# Patient Record
Sex: FEMALE | Race: WHITE | NOT HISPANIC OR LATINO | Employment: OTHER | URBAN - METROPOLITAN AREA
[De-identification: names, ages, dates, MRNs, and addresses within clinical notes are randomized per-mention and may not be internally consistent; named-entity substitution may affect disease eponyms.]

---

## 2017-01-04 ENCOUNTER — OFFICE VISIT (OUTPATIENT)
Dept: INTERNAL MEDICINE | Facility: CLINIC | Age: 77
End: 2017-01-04

## 2017-01-04 ENCOUNTER — TELEPHONE (OUTPATIENT)
Dept: INTERNAL MEDICINE | Facility: CLINIC | Age: 77
End: 2017-01-04

## 2017-01-04 ENCOUNTER — HOSPITAL ENCOUNTER (OUTPATIENT)
Dept: ULTRASOUND IMAGING | Facility: HOSPITAL | Age: 77
Discharge: HOME | End: 2017-01-04
Attending: INTERNAL MEDICINE | Admitting: INTERNAL MEDICINE

## 2017-01-04 VITALS
DIASTOLIC BLOOD PRESSURE: 78 MMHG | SYSTOLIC BLOOD PRESSURE: 124 MMHG | HEIGHT: 62 IN | OXYGEN SATURATION: 94 % | HEART RATE: 69 BPM
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
## 2017-01-04 DIAGNOSIS — E10.9 BRITTLE DIABETES MELLITUS (HCC): ICD-10-CM
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
## 2017-01-04 DIAGNOSIS — Z79.01 WARFARIN ANTICOAGULATION: ICD-10-CM

## 2017-01-04 DIAGNOSIS — M79.89 SWELLING OF LOWER EXTREMITY: ICD-10-CM

## 2017-01-04 DIAGNOSIS — M79.89 SWELLING OF LOWER EXTREMITY: Primary | ICD-10-CM

## 2017-01-04 LAB — INR PPP: 1.2 (ref 2–3)

## 2017-01-04 PROCEDURE — 85610 PROTHROMBIN TIME: CPT | Performed by: INTERNAL MEDICINE

## 2017-01-04 PROCEDURE — 36416 COLLJ CAPILLARY BLOOD SPEC: CPT | Performed by: INTERNAL MEDICINE

## 2017-01-04 PROCEDURE — 76881 US COMPL JOINT R-T W/IMG: CPT

## 2017-01-04 PROCEDURE — 99214 OFFICE O/P EST MOD 30 MIN: CPT | Performed by: INTERNAL MEDICINE

## 2017-01-04 RX ORDER — INSULIN DETEMIR 100 [IU]/ML
INJECTION, SOLUTION SUBCUTANEOUS
COMMUNITY
Start: 2016-12-22 | End: 2017-04-26

## 2017-01-04 NOTE — TELEPHONE ENCOUNTER
----- Message from Johnna John MA sent at 1/3/2017  1:20 PM EST -----  Contact: [phone]  Per Dr. Mercado, put in at [phone].  ----- Message -----     From: Johnna John MA     Sent: 1/3/2017   9:37 AM       To: Johnna John MA        ----- Message -----     From: Beth Richardson     Sent: 1/3/2017   9:32 AM       To: Izabella Mercado Clinical [place]    Pt. Requesting an appointment today around 4 pm. Her left leg isn't any better at all and she has a ride to come in today at 4pm.  I told her that we can check with Dr. Mercado to see where we can fit her in.

## 2017-01-04 NOTE — MR AVS SNAPSHOT
Joya Singh   1/4/2017 12:20 PM   Office Visit    Dept Phone:  [phone]   Encounter #:  [id number]    Provider:  Chari Mercado MD   Department:  [facility] INTERNAL MED AND PEDS                Your Full Care Plan              Your Updated Medication List          This list is accurate as of: 1/4/17  1:46 PM.  Always use your most recent med list.                ACCU-CHEK FASTCLIX LANCETS misc   1 each 3 (three) times a day. TEST THREE TIMES DAILY TO FOUR TIMES DAILY AS DIRECTED       ACCU-CHEK KENNETH SMARTVIEW W/DEVICE kit       acetaminophen 500 MG tablet   Commonly known as:  TYLENOL       aspirin 81 MG tablet       atorvastatin 10 MG tablet   Commonly known as:  LIPITOR   Take one (1) tablet orally (by mouth) once daily       B-D SINGLE USE SWABS REGULAR pads       buPROPion  MG 12 hr tablet   Commonly known as:  WELLBUTRIN SR       CALCIUM PLUS VITAMIN D3 PO       cephalexin 500 MG capsule   Commonly known as:  KEFLEX   Take 1 capsule by mouth 2 (Two) Times a Day.       cetirizine 10 MG tablet   Commonly known as:  zyrTEC       cyclobenzaprine 10 MG tablet   Commonly known as:  FLEXERIL   Take 1 tablet by mouth 3 (three) times a day as needed for muscle spasms.       diphenhydrAMINE 25 mg capsule   Commonly known as:  BENADRYL       DULoxetine 60 MG capsule   Commonly known as:  CYMBALTA   Take 1 capsule by mouth Daily.       furosemide 20 MG tablet   Commonly known as:  LASIX   Take one (1) tablet orally (by mouth) daily as directed       gabapentin 400 MG capsule   Commonly known as:  NEURONTIN   Take 1 in am 1 afternoon  and 2 at bedtime       glucose blood test strip   Commonly known as:  ACCU-CHEK SMARTVIEW   Accu-Chek SmartView In Vitro Strip TEST THREE TIMES DAILY  TO FOUR TIMES DAILY AS DIRECTED       insulin aspart 100 UNIT/ML solution pen-injector sc pen   Commonly known as:  NOVOLOG FLEXPEN   Inject 20 Units under the skin 3 (Three) Times a Day  "With Meals.       Insulin Degludec 200 UNIT/ML solution pen-injector   Commonly known as:  TRESIBA FLEXTOUCH   Inject 70 Units under the skin Every Night.       LEVEMIR FLEXTOUCH 100 UNIT/ML injection   Generic drug:  insulin detemir       levothyroxine 50 MCG tablet   Commonly known as:  SYNTHROID, LEVOTHROID   Take 1 tablet by mouth Daily.       lisinopril 5 MG tablet   Commonly known as:  PRINIVIL,ZESTRIL   Take one (1) tablet orally (by mouth) daily       metoprolol tartrate 25 MG tablet   Commonly known as:  LOPRESSOR   Take 1/2 tablet orally (by mouth) twice daily       Needle (Disp) 30G X 1/2\" misc   Commonly known as:  BD DISP NEEDLES   To be used 3 times daily with Novolog Flexpen.       omeprazole 40 MG capsule   Commonly known as:  priLOSEC   Take 1 capsule by mouth Daily.       potassium chloride 10 MEQ CR tablet   Commonly known as:  K-DUR   Take one (1) tablet orally (by mouth) daily       QUEtiapine 50 MG tablet   Commonly known as:  SEROquel   Take one (1) tablet orally (by mouth) each night at bedtime       traMADol 50 MG tablet   Commonly known as:  ULTRAM   Take one (1) tablet orally (by mouth) every 8 hours as needed FOR PAIN       traZODone 100 MG tablet   Commonly known as:  DESYREL       ULTICARE MICRO PEN NEEDLES 32G X 4 MM misc   Generic drug:  Insulin Pen Needle       warfarin 5 MG tablet   Commonly known as:  COUMADIN   Alternate 1 tablet by mouth one evening and then 1 1/2 tab by mouth the next evening               We Performed the Following     Ambulatory Referral to Home Health     POC INR       You Were Diagnosed With        Codes Comments    Swelling of lower extremity    -  Primary ICD-10-CM: M79.89  ICD-9-CM: 729.81     Fall in home, sequela     ICD-10-CM: W19.XXXS  ICD-9-CM: 909.4, E929.3     Brittle diabetes mellitus     ICD-10-CM: E10.9  ICD-9-CM: 250.00     Warfarin anticoagulation     ICD-10-CM: Z79.01  ICD-9-CM: V58.61       Instructions     None    Patient Instructions " History      Upcoming Appointments     Visit Type Date Time Department    ACUTE           2017 12:20 PM [facility]    US LAG NONVASC EXT COMP 2017  4:00 PM [facility] ULTRASOUND    FOLLOW UP 2017  8:20 AM [facility]    FOLLOW UP 2017  8:45 AM [facility]TH[facility]ED [facility]    FOLLOW UP SLEEP CLINIC 3/28/2017 10:45 AM [facility] SLEEP LAB      MyChart Signup     [facility] [employer] allows you to send messages to your doctor, view your test results, renew your prescriptions, schedule appointments, and more. To sign up, go to Hash[employer] and click on the Sign Up Now link in the New User? box. Enter your MarketV[employer] Activation Code exactly as it appears below along with the last four digits of your Social Security Number and your Date of Birth () to complete the sign-up process. If you do not sign up before the expiration date, you must request a new code.    MarketV[employer] Activation Code: [email or web address]  Expires: 2017  5:35 AM    If you have questions, you can email [email or web address] or call [phone] to talk to our MarketV[employer] staff. Remember, [employer] is NOT to be used for urgent needs. For medical emergencies, dial 911.               Other Info from Your Visit           Your Appointments     2017  4:00 PM EST   US lag nonvasc ext comp with LAG US 1   [facility] [place] [facility] ([place])    [address]  [place] [address] [phone]           No prep. Arrive 15 minutes before your exam. Bring a current list of medications.            2017  8:20 AM EST   Follow Up with Chari Mercado MD   [facility] INTERNAL MED AND PEDS (--)    [address]  [place] [address] [phone]           Arrive 15 minutes prior to appointment.            2017  8:45 AM EST   Follow Up with German Wylie MD   [facility] ORTHOPEDICS (--)    [address]  [place]  "[place] [address] [phone]           Arrive 15 minutes prior to appointment.            Mar 28, 2017 10:45 AM EDT   Follow Up Sleep Clinic with [facility] SLEEP LAB PROVIDER SCHEDULE   [facility] SLEEP CENTER ([place])    [address] [place] [address]  [place] [address] [phone]            1.  If you are currently on a CPAP or BiPAP please bring in your SD card or memory card.  2.  If you are experiencing problems with your current mask, please bring your mask with you to your appointment.                Allergies     Codeine      Morphine And Related      Penicillins        Reason for Visit     Leg Pain Pts son says she has not taken her meds since Sunday.      Vital Signs     Blood Pressure Pulse Height Oxygen Saturation Smoking Status       124/78 (BP Location: Left arm, Patient Position: Sitting, Cuff Size: Adult) 69 62\" (157.5 cm) 94% Never Smoker       Problems and Diagnoses Noted     Brittle diabetes mellitus    Fall in home    Swelling of the ankle, feet, or leg    Taking blood thinners        "

## 2017-01-04 NOTE — PROGRESS NOTES
Subjective     Joya Singh is a 76 y.o. female, who presents with a chief complaint of   Chief Complaint   Patient presents with   • Leg Pain     Pts son says she has not taken her meds since Sunday.       HPI   The pt is here for follow up.  Her left leg is still hurting her.  The pain is in the medial part of there left lower leg.  She has had a negative u/s for clots.  She was tried on empiric antibiotics with no change.  Heat helps a little.  She is willing to try some physical therapy.  She has some left leg swelling.  She has fallen at home.  She doesn't want to eat and quit taking her meds for a few days.  She is a brittle diabetic with lots of fluctuation in her blood sugars.  She is also on warfarin which makes her high risk with all of her recent falls.  She hasnt taken her coumadin in a few days.        The following portions of the patient's history were reviewed and updated as appropriate: allergies, current medications, past family history, past medical history, past social history, past surgical history and problem list.    Allergies: Codeine; Morphine and related; and Penicillins    Review of Systems   Constitutional: Negative.    HENT: Negative.    Eyes: Negative.    Respiratory: Negative.    Cardiovascular: Negative.    Gastrointestinal: Negative.    Endocrine: Negative.    Genitourinary: Negative.    Musculoskeletal:        Leg pain     Skin: Negative.    Allergic/Immunologic: Negative.    Neurological: Negative.    Hematological: Negative.    Psychiatric/Behavioral: Negative.    All other systems reviewed and are negative.      Objective     Wt Readings from Last 3 Encounters:   12/21/16 209 lb (94.8 kg)   11/23/16 209 lb 6.4 oz (95 kg)   11/18/16 214 lb (97.1 kg)     Temp Readings from Last 3 Encounters:   11/08/16 97.8 °F (36.6 °C)   07/19/16 98.6 °F (37 °C)   06/15/16 97.9 °F (36.6 °C)     BP Readings from Last 3 Encounters:   01/04/17 124/78   12/21/16 114/80   12/14/16 124/70     Pulse  Readings from Last 3 Encounters:   01/04/17 69   12/21/16 72   12/14/16 68     There is no height or weight on file to calculate BMI.    Physical Exam   Constitutional: She is oriented to person, place, and time. She appears well-developed and well-nourished. No distress.   HENT:   Head: Normocephalic and atraumatic.   Right Ear: External ear normal.   Left Ear: External ear normal.   Nose: Nose normal.   Mouth/Throat: Oropharynx is clear and moist.   Eyes: Conjunctivae and EOM are normal. Pupils are equal, round, and reactive to light.   Neck: Normal range of motion. Neck supple.   Cardiovascular: Normal rate, regular rhythm, normal heart sounds and intact distal pulses.    Pulmonary/Chest: Effort normal and breath sounds normal. No respiratory distress. She has no wheezes.   Musculoskeletal:   Pt in wheelchair  Left lower leg with fluid like collection in the medial area below the left knee.  No erythema or increased warmth.   Neurological: She is alert and oriented to person, place, and time.   Skin: Skin is warm and dry.   Psychiatric: She has a normal mood and affect. Her behavior is normal. Judgment and thought content normal.   Nursing note and vitals reviewed.          Results for orders placed or performed in visit on 12/21/16   POC INR   Result Value Ref Range    INR 2.20 2 - 3       Assessment/Plan   Joya was seen today for leg pain.    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  -     US nonvascular extremity complete; Future  -     Ambulatory Referral to Home Health  -     POC INR          Current  Outpatient Prescriptions:   •  ACCU-CHEK FASTCLIX LANCETS misc, 1 each 3 (three) times a day. TEST THREE TIMES DAILY TO FOUR TIMES DAILY AS DIRECTED, Disp: 102 each, Rfl: 11  •  acetaminophen (TYLENOL) 500 MG tablet, Take by mouth., Disp: , Rfl:   •  Alcohol Swabs (B-D SINGLE USE SWABS REGULAR) pads, , Disp: , Rfl:   •  aspirin 81 MG tablet, Take 1 tablet by mouth daily., Disp: , Rfl:   •  atorvastatin (LIPITOR) 10 MG tablet, Take one (1) tablet orally (by mouth) once daily, Disp: 90 tablet, Rfl: 1  •  Blood Glucose Monitoring Suppl (ACCU-CHEK KENNETH SMARTVIEW) W/DEVICE kit, USE AS DIRECTED, Disp: , Rfl:   •  buPROPion SR (WELLBUTRIN SR) 150 MG 12 hr tablet, Take 1 tablet by mouth Every Night., Disp: , Rfl:   •  Calcium Carb-Cholecalciferol (CALCIUM PLUS VITAMIN D3 PO), Take by mouth., Disp: , Rfl:   •  cephalexin (KEFLEX) 500 MG capsule, Take 1 capsule by mouth 2 (Two) Times a Day., Disp: 20 capsule, Rfl: 0  •  cetirizine (ZyrTEC) 10 MG tablet, Take by mouth., Disp: , Rfl:   •  cyclobenzaprine (FLEXERIL) 10 MG tablet, Take 1 tablet by mouth 3 (three) times a day as needed for muscle spasms., Disp: 90 tablet, Rfl: 1  •  diphenhydrAMINE (BENADRYL) 25 mg capsule, Take 25 mg by mouth Every 6 (Six) Hours As Needed for itching (take with norco)., Disp: , Rfl:   •  DULoxetine (CYMBALTA) 60 MG capsule, Take 1 capsule by mouth Daily., Disp: 90 capsule, Rfl: 1  •  furosemide (LASIX) 20 MG tablet, Take one (1) tablet orally (by mouth) daily as directed, Disp: 90 tablet, Rfl: 1  •  gabapentin (NEURONTIN) 400 MG capsule, Take 1 in am 1 afternoon  and 2 at bedtime, Disp: 120 capsule, Rfl: 5  •  glucose blood (ACCU-CHEK SMARTVIEW) test strip, Accu-Chek SmartView In Vitro Strip TEST THREE TIMES DAILY  TO FOUR TIMES DAILY AS DIRECTED, Disp: 300 each, Rfl: 3  •  insulin aspart (NOVOLOG FLEXPEN) 100 UNIT/ML solution pen-injector sc pen, Inject 20 Units under the skin 3 (Three) Times a Day With Meals., Disp: 10 pen, Rfl: 1  •  Insulin  "Degludec (TRESIBA FLEXTOUCH) 200 UNIT/ML solution pen-injector, Inject 70 Units under the skin Every Night., Disp: 9 mL, Rfl: 6  •  LEVEMIR FLEXTOUCH 100 UNIT/ML injection, , Disp: , Rfl:   •  levothyroxine (SYNTHROID, LEVOTHROID) 50 MCG tablet, Take 1 tablet by mouth Daily., Disp: 90 tablet, Rfl: 1  •  lisinopril (PRINIVIL,ZESTRIL) 5 MG tablet, Take one (1) tablet orally (by mouth) daily, Disp: 30 tablet, Rfl: 6  •  metoprolol tartrate (LOPRESSOR) 25 MG tablet, Take 1/2 tablet orally (by mouth) twice daily, Disp: 90 tablet, Rfl: 1  •  Needle, Disp, (BD DISP NEEDLES) 30G X 1/2\" misc, To be used 3 times daily with Novolog Flexpen., Disp: 100 each, Rfl: 5  •  omeprazole (priLOSEC) 40 MG capsule, Take 1 capsule by mouth Daily., Disp: 90 capsule, Rfl: 1  •  potassium chloride (K-DUR) 10 MEQ CR tablet, Take one (1) tablet orally (by mouth) daily, Disp: 90 tablet, Rfl: 2  •  QUEtiapine (SEROquel) 50 MG tablet, Take one (1) tablet orally (by mouth) each night at bedtime, Disp: 30 tablet, Rfl: 6  •  traMADol (ULTRAM) 50 MG tablet, Take one (1) tablet orally (by mouth) every 8 hours as needed FOR PAIN, Disp: 60 tablet, Rfl: 0  •  traZODone (DESYREL) 100 MG tablet, Take 2 tablets by mouth Every Night., Disp: , Rfl:   •  ULTICARE MICRO PEN NEEDLES 32G X 4 MM misc, , Disp: , Rfl:   •  warfarin (COUMADIN) 5 MG tablet, Alternate 1 tablet by mouth one evening and then 1 1/2 tab by mouth the next evening, Disp: 60 tablet, Rfl: 1  There are no discontinued medications.    Return in about 2 weeks (around 1/18/2017) for Recheck.  "

## 2017-01-09 ENCOUNTER — TELEPHONE (OUTPATIENT)
Dept: INTERNAL MEDICINE | Facility: CLINIC | Age: 77
End: 2017-01-09

## 2017-01-09 DIAGNOSIS — M54.9 BACK PAIN, CHRONIC: ICD-10-CM

## 2017-01-09 DIAGNOSIS — G89.29 BACK PAIN, CHRONIC: ICD-10-CM

## 2017-01-09 RX ORDER — CYCLOBENZAPRINE HCL 10 MG
TABLET ORAL
Qty: 90 TABLET | Refills: 0 | Status: SHIPPED | OUTPATIENT
Start: 2017-01-09 | End: 2017-03-13 | Stop reason: SDUPTHER

## 2017-01-09 NOTE — TELEPHONE ENCOUNTER
Patient notified.   ----- Message from Chari Mercado MD sent at 1/9/2017  7:52 AM EST -----  Call pt about u/s.  It is all normal

## 2017-01-11 ENCOUNTER — TELEPHONE (OUTPATIENT)
Dept: INTERNAL MEDICINE | Facility: CLINIC | Age: 77
End: 2017-01-11

## 2017-01-11 NOTE — TELEPHONE ENCOUNTER
"Hali given verbal order for \"theraputic\" ultrasound.    ----- Message from Beth Farley MA sent at 1/11/2017  1:35 PM EST -----  LM for HALI to call back with clarification/fax# for order.  ----- Message -----     From: Beth Farley MA     Sent: 1/10/2017   4:10 PM       To: Beth Farley MA        ----- Message -----     From: Keren Parmar     Sent: 1/10/2017   3:22 PM       To: Izabella Brantley [facility] PHYSICAL THERAPIST (HALI) IS CALLER. STATES SHE IS HAVING PAIN IN HER RIGHT LEG. WOULD LIKE TO DO ANOTHER ULTRA SOUND, NOT DIAGNOSTIC- JUST AN ULTRA SOUND FOR TREATMENT OF PAIN. CAN WE GET THE PT AN ORDER? CALL BACK FOR PHYSICAL THERAPIST [phone]. PT CALL BACK [phone].      "

## 2017-01-24 ENCOUNTER — OFFICE VISIT (OUTPATIENT)
Dept: INTERNAL MEDICINE | Facility: CLINIC | Age: 77
End: 2017-01-24

## 2017-01-24 VITALS
WEIGHT: 207.4 LBS | DIASTOLIC BLOOD PRESSURE: 84 MMHG | HEIGHT: 62 IN | OXYGEN SATURATION: 95 % | SYSTOLIC BLOOD PRESSURE: 130 MMHG | HEART RATE: 96 BPM | BODY MASS INDEX: 38.16 KG/M2

## 2017-01-24 DIAGNOSIS — E08.59 DIABETES MELLITUS DUE TO UNDERLYING CONDITION WITH OTHER CIRCULATORY COMPLICATIONS (HCC): ICD-10-CM

## 2017-01-24 DIAGNOSIS — Z79.01 WARFARIN ANTICOAGULATION: ICD-10-CM

## 2017-01-24 DIAGNOSIS — I82.409 ACUTE DEEP VEIN THROMBOSIS (DVT) OF OTHER VEIN OF LOWER EXTREMITY: Primary | ICD-10-CM

## 2017-01-24 DIAGNOSIS — M79.605 LEFT LEG PAIN: ICD-10-CM

## 2017-01-24 DIAGNOSIS — E78.2 MIXED HYPERLIPIDEMIA: ICD-10-CM

## 2017-01-24 DIAGNOSIS — I25.10 CHRONIC CORONARY ARTERY DISEASE: ICD-10-CM

## 2017-01-24 DIAGNOSIS — I10 ESSENTIAL HYPERTENSION: ICD-10-CM

## 2017-01-24 LAB
INR PPP: 1.57 (ref 0.9–1.1)
PROTHROMBIN TIME: 18.9 SECONDS (ref 12.1–15)

## 2017-01-24 PROCEDURE — 99214 OFFICE O/P EST MOD 30 MIN: CPT | Performed by: INTERNAL MEDICINE

## 2017-01-24 RX ORDER — TRAMADOL HYDROCHLORIDE 50 MG/1
50 TABLET ORAL EVERY 8 HOURS PRN
Qty: 60 TABLET | Refills: 0 | Status: SHIPPED | OUTPATIENT
Start: 2017-01-24 | End: 2017-03-03 | Stop reason: SDUPTHER

## 2017-01-24 RX ORDER — WARFARIN SODIUM 5 MG/1
TABLET ORAL
Qty: 60 TABLET | Refills: 1 | Status: SHIPPED | OUTPATIENT
Start: 2017-01-24 | End: 2017-08-18

## 2017-01-24 NOTE — MR AVS SNAPSHOT
Joya Singh   1/24/2017 8:20 AM   Office Visit    Dept Phone:  [phone]   Encounter #:  [id number]    Provider:  Chari Mercado MD   Department:  [facility] INTERNAL MED AND PEDS                Your Full Care Plan              Today's Medication Changes          These changes are accurate as of: 1/24/17  9:55 AM.  If you have any questions, ask your nurse or doctor.               Medication(s)that have changed:     traMADol 50 MG tablet   Commonly known as:  ULTRAM   Take 1 tablet by mouth Every 8 (Eight) Hours As Needed for moderate pain (4-6).   What changed:  See the new instructions.   Changed by:  Chari Mercado MD         Stop taking medication(s)listed here:     cephalexin 500 MG capsule   Commonly known as:  KEFLEX   Stopped by:  Chari Mercado MD                Where to Get Your Medications      These medications were sent to [facility] [phone]  - [phone] [address]     Phone:  [phone]     warfarin 5 MG tablet         You can get these medications from any pharmacy     Bring a paper prescription for each of these medications     traMADol 50 MG tablet                  Your Updated Medication List          This list is accurate as of: 1/24/17  9:55 AM.  Always use your most recent med list.                ACCU-CHEK FASTCLIX LANCETS misc   1 each 3 (three) times a day. TEST THREE TIMES DAILY TO FOUR TIMES DAILY AS DIRECTED       ACCU-CHEK KENNETH SMARTVIEW W/DEVICE kit       acetaminophen 500 MG tablet   Commonly known as:  TYLENOL       aspirin 81 MG tablet       atorvastatin 10 MG tablet   Commonly known as:  LIPITOR   Take one (1) tablet orally (by mouth) once daily       B-D SINGLE USE SWABS REGULAR pads       buPROPion  MG 12 hr tablet   Commonly known as:  WELLBUTRIN SR       CALCIUM PLUS VITAMIN D3 PO       cetirizine 10 MG  "tablet   Commonly known as:  zyrTEC       cyclobenzaprine 10 MG tablet   Commonly known as:  FLEXERIL   Take one (1) tablet orally (by mouth) three times daily as needed       diphenhydrAMINE 25 mg capsule   Commonly known as:  BENADRYL       DULoxetine 60 MG capsule   Commonly known as:  CYMBALTA   Take 1 capsule by mouth Daily.       furosemide 20 MG tablet   Commonly known as:  LASIX   Take one (1) tablet orally (by mouth) daily as directed       gabapentin 400 MG capsule   Commonly known as:  NEURONTIN   Take 1 in am 1 afternoon  and 2 at bedtime       glucose blood test strip   Commonly known as:  ACCU-CHEK SMARTVIEW   Accu-Chek SmartView In Vitro Strip TEST THREE TIMES DAILY  TO FOUR TIMES DAILY AS DIRECTED       insulin aspart 100 UNIT/ML solution pen-injector sc pen   Commonly known as:  NOVOLOG FLEXPEN   Inject 20 Units under the skin 3 (Three) Times a Day With Meals.       Insulin Degludec 200 UNIT/ML solution pen-injector   Commonly known as:  TRESIBA FLEXTOUCH   Inject 70 Units under the skin Every Night.       LEVEMIR FLEXTOUCH 100 UNIT/ML injection   Generic drug:  insulin detemir       levothyroxine 50 MCG tablet   Commonly known as:  SYNTHROID, LEVOTHROID   Take 1 tablet by mouth Daily.       lisinopril 5 MG tablet   Commonly known as:  PRINIVIL,ZESTRIL   Take one (1) tablet orally (by mouth) daily       metoprolol tartrate 25 MG tablet   Commonly known as:  LOPRESSOR   Take 1/2 tablet orally (by mouth) twice daily       Needle (Disp) 30G X 1/2\" misc   Commonly known as:  BD DISP NEEDLES   To be used 3 times daily with Novolog Flexpen.       omeprazole 40 MG capsule   Commonly known as:  priLOSEC   Take 1 capsule by mouth Daily.       potassium chloride 10 MEQ CR tablet   Commonly known as:  K-DUR   Take one (1) tablet orally (by mouth) daily       QUEtiapine 50 MG tablet   Commonly known as:  SEROquel   Take one (1) tablet orally (by mouth) each night at bedtime       traMADol 50 MG tablet "   Commonly known as:  ULTRAM   Take 1 tablet by mouth Every 8 (Eight) Hours As Needed for moderate pain (4-6).       traZODone 100 MG tablet   Commonly known as:  DESYREL       ULTICARE MICRO PEN NEEDLES 32G X 4 MM misc   Generic drug:  Insulin Pen Needle       warfarin 5 MG tablet   Commonly known as:  COUMADIN   Alternate 1 tablet by mouth one evening and then 1 1/2 tab by mouth the next evening               We Performed the Following     Protime-INR       You Were Diagnosed With        Codes Comments    Acute deep vein thrombosis (DVT) of other vein of lower extremity    -  Primary ICD-10-CM: I82.409     Warfarin anticoagulation     ICD-10-CM: Z79.01  ICD-9-CM: V58.61     Left leg pain     ICD-10-CM: M79.605  ICD-9-CM: 729.5     Chronic coronary artery disease     ICD-10-CM: I25.10  ICD-9-CM: 414.00     Mixed hyperlipidemia     ICD-10-CM: E78.2  ICD-9-CM: 272.2     Essential hypertension     ICD-10-CM: I10  ICD-9-CM: 401.9     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 To sign up, go to WebSafety and click on the Sign Up Now link in the New User? box. Enter your Pintics Activation Code exactly as it appears below along with the last four digits of your Social Security Number and your Date of Birth () to complete the sign-up process. If you do not sign up before the expiration date, you must request a new code.    Pintics Activation Code:  [email or web address]  Expires: 2/7/2017  9:55 AM    If you have questions, you can email [email or web address] or call [phone] to talk to our MyChart staff. Remember, Shortlist[employer] is NOT to be used for urgent needs. For medical emergencies, dial 911.               Other Info from Your Visit           Your Appointments     Feb 21, 2017  8:45 AM EST   Follow Up with German Wylie MD   [facility] MEDICAL [facility] ORTHOPEDICS (--)    [address]  [facility] [address] [phone]           Arrive 15 minutes prior to appointment.            Mar 28, 2017 10:45 AM EDT   Follow Up Sleep Clinic with  L[facility] SLEEP LAB PROVIDER SCHEDULE   [facility] SLEEP CENTER ([place])    [address]  [place] [address] [phone]            1.  If you are currently on a CPAP or BiPAP please bring in your SD card or memory card.  2.  If you are experiencing problems with your current mask, please bring your mask with you to your appointment.                Allergies     Codeine      Morphine And Related      Penicillins        Reason for Visit     Follow-up Patient says that OT told her yesterday that her O2 was low.     Cellulitis L leg, still having a lot of pain. 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

## 2017-01-24 NOTE — PROGRESS NOTES
"Subjective     Joya Singh is a 76 y.o. female, who presents with a chief complaint of   Chief Complaint   Patient presents with   • Follow-up     Patient says that OT told her yesterday that her O2 was low.    • Cellulitis     L leg, still having a lot of pain. Patient says pain is \"in her veins\"       HPI   The pt is here for follow up.  She still is having leg pain but says that it is getting better.  She feels like the pain is in her veins.  Her work up for clot was neg.  OT/PT is coming to  See her with home health.  No increased pain with activity.  The pain is still in the medial area of the leg below the knee.  No calf pain.  No pain in her toes.  No hx of peripheral artery disease.  She does have peripheral neuropathy. She has pain medication at home but needs a refill.      The pt says the therapist told her her oxygen levels were low yesterday.  She doesn't know how low the levels were.  She has cpap at home but she hasnt been using it lately.     Her glucoses have been better controlled per the pt.  135 and below.  She says she hasnt had anything over 200.  She has had lots of issues with uncontrolled diabetes with a1c's in the 9-11 range.  Her last a1c was much better at 6.9. No hypoglycemia.  + hx CAD.    The following portions of the patient's history were reviewed and updated as appropriate: allergies, current medications, past family history, past medical history, past social history, past surgical history and problem list.    Allergies: Codeine; Morphine and related; and Penicillins    Review of Systems   Constitutional: Negative.    HENT: Negative.    Eyes: Negative.    Respiratory: Negative.    Cardiovascular: Negative.    Gastrointestinal: Negative.    Endocrine: Negative.    Genitourinary: Negative.    Musculoskeletal:        Leg pain.   Skin: Negative.    Allergic/Immunologic: Negative.    Neurological: Negative.    Hematological: Negative.    Psychiatric/Behavioral: Negative.    All other " systems reviewed and are negative.      Objective     Wt Readings from Last 3 Encounters:   01/24/17 207 lb 6.4 oz (94.1 kg)   12/21/16 209 lb (94.8 kg)   11/23/16 209 lb 6.4 oz (95 kg)     Temp Readings from Last 3 Encounters:   11/08/16 97.8 °F (36.6 °C)   07/19/16 98.6 °F (37 °C)   06/15/16 97.9 °F (36.6 °C)     BP Readings from Last 3 Encounters:   01/24/17 130/84   01/04/17 124/78   12/21/16 114/80     Pulse Readings from Last 3 Encounters:   01/24/17 96   01/04/17 69   12/21/16 72     Body mass index is 37.93 kg/(m^2).  SpO2 Readings from Last 3 Encounters:   01/24/17 95%   01/04/17 94%   12/21/16 98%       Physical Exam   Constitutional: She is oriented to person, place, and time. She appears well-developed and well-nourished. No distress.   HENT:   Head: Normocephalic and atraumatic.   Right Ear: External ear normal.   Left Ear: External ear normal.   Nose: Nose normal.   Mouth/Throat: Oropharynx is clear and moist.   Eyes: Conjunctivae and EOM are normal. Pupils are equal, round, and reactive to light.   Neck: Normal range of motion. Neck supple.   Cardiovascular: Normal rate, regular rhythm, normal heart sounds and intact distal pulses.    Pulmonary/Chest: Effort normal and breath sounds normal. No respiratory distress. She has no wheezes.   Musculoskeletal: Normal range of motion.   Normal gait   Neurological: She is alert and oriented to person, place, and time.   Skin: Skin is warm and dry.   Psychiatric: She has a normal mood and affect. Her behavior is normal. Judgment and thought content normal.   Nursing note and vitals reviewed.      Results for orders placed or performed in visit on 01/04/17   POC INR   Result Value Ref Range    INR 1.2 (A) 2 - 3       Assessment/Plan   Joya was seen today for follow-up and cellulitis.    Diagnoses and all orders for this visit:    Acute deep vein thrombosis (DVT) of other vein of lower extremity  -     Protime-INR; Future  -     Protime-INR    Warfarin  anticoagulation - INR readings have been up and down for a long time.  Recheck INR today.   -     warfarin (COUMADIN) 5 MG tablet; Alternate 1 tablet by mouth one evening and then 1 1/2 tab by mouth the next evening    Left leg pain  -     US Ankle / Brachial Indices Extremity Complete; Future  -     traMADol (ULTRAM) 50 MG tablet; Take 1 tablet by mouth Every 8 (Eight) Hours As Needed for moderate pain (4-6).  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 TEST THREE TIMES DAILY TO FOUR TIMES DAILY AS DIRECTED 102 each 11   • acetaminophen (TYLENOL) 500 MG tablet Take by mouth.     • Alcohol Swabs (B-D SINGLE USE SWABS REGULAR) pads      • aspirin 81 MG tablet Take 1 tablet by mouth daily.     • atorvastatin (LIPITOR) 10 MG tablet Take one (1) tablet orally (by mouth) once daily 90 tablet 1   • Blood Glucose Monitoring Suppl (ACCU-CHEK KENNETH SMARTVIEW) W/DEVICE kit USE AS DIRECTED     • buPROPion SR (WELLBUTRIN SR) 150 MG 12 hr tablet Take 1 tablet by mouth Every Night.     • Calcium Carb-Cholecalciferol (CALCIUM PLUS VITAMIN D3 PO) Take by mouth.     • cetirizine (ZyrTEC) 10 MG tablet Take by mouth.     •  "cyclobenzaprine (FLEXERIL) 10 MG tablet Take one (1) tablet orally (by mouth) three times daily as needed 90 tablet 0   • diphenhydrAMINE (BENADRYL) 25 mg capsule Take 25 mg by mouth Every 6 (Six) Hours As Needed for itching (take with norco).     • DULoxetine (CYMBALTA) 60 MG capsule Take 1 capsule by mouth Daily. 90 capsule 1   • furosemide (LASIX) 20 MG tablet Take one (1) tablet orally (by mouth) daily as directed 90 tablet 1   • gabapentin (NEURONTIN) 400 MG capsule Take 1 in am 1 afternoon  and 2 at bedtime 120 capsule 5   • glucose blood (ACCU-CHEK SMARTVIEW) test strip Accu-Chek SmartView In Vitro Strip TEST THREE TIMES DAILY  TO FOUR TIMES DAILY AS DIRECTED 300 each 3   • insulin aspart (NOVOLOG FLEXPEN) 100 UNIT/ML solution pen-injector sc pen Inject 20 Units under the skin 3 (Three) Times a Day With Meals. 10 pen 1   • Insulin Degludec (TRESIBA FLEXTOUCH) 200 UNIT/ML solution pen-injector Inject 70 Units under the skin Every Night. 9 mL 6   • LEVEMIR FLEXTOUCH 100 UNIT/ML injection      • levothyroxine (SYNTHROID, LEVOTHROID) 50 MCG tablet Take 1 tablet by mouth Daily. 90 tablet 1   • lisinopril (PRINIVIL,ZESTRIL) 5 MG tablet Take one (1) tablet orally (by mouth) daily 30 tablet 6   • metoprolol tartrate (LOPRESSOR) 25 MG tablet Take 1/2 tablet orally (by mouth) twice daily 90 tablet 1   • Needle, Disp, (BD DISP NEEDLES) 30G X 1/2\" misc To be used 3 times daily with Novolog Flexpen. 100 each 5   • omeprazole (priLOSEC) 40 MG capsule Take 1 capsule by mouth Daily. 90 capsule 1   • potassium chloride (K-DUR) 10 MEQ CR tablet Take one (1) tablet orally (by mouth) daily 90 tablet 2   • QUEtiapine (SEROquel) 50 MG tablet Take one (1) tablet orally (by mouth) each night at bedtime 30 tablet 6   • traZODone (DESYREL) 100 MG tablet Take 2 tablets by mouth Every Night.     • ULTICARE MICRO PEN NEEDLES 32G X 4 MM misc      • traMADol (ULTRAM) 50 MG tablet Take one (1) tablet orally (by mouth) every 8 hours as " needed FOR PAIN 60 tablet 0   • warfarin (COUMADIN) 5 MG tablet Alternate 1 tablet by mouth one evening and then 1 1/2 tab by mouth the next evening 60 tablet 1   • cephalexin (KEFLEX) 500 MG capsule Take 1 capsule by mouth 2 (Two) Times a Day. 20 capsule 0     No facility-administered medications prior to visit.      New Medications Ordered This Visit   Medications   • warfarin (COUMADIN) 5 MG tablet     Sig: Alternate 1 tablet by mouth one evening and then 1 1/2 tab by mouth the next evening     Dispense:  60 tablet     Refill:  1   • traMADol (ULTRAM) 50 MG tablet     Sig: Take 1 tablet by mouth Every 8 (Eight) Hours As Needed for moderate pain (4-6).     Dispense:  60 tablet     Refill:  0       Medications Discontinued During This Encounter   Medication Reason   • cephalexin (KEFLEX) 500 MG capsule    • warfarin (COUMADIN) 5 MG tablet Reorder   • traMADol (ULTRAM) 50 MG tablet Reorder         Return in about 1 month (around 2/24/2017) for Recheck, labs.

## 2017-01-25 ENCOUNTER — HOSPITAL ENCOUNTER (EMERGENCY)
Facility: HOSPITAL | Age: 77
Discharge: HOME | End: 2017-01-25
Attending: EMERGENCY MEDICINE | Admitting: EMERGENCY MEDICINE

## 2017-01-25 ENCOUNTER — APPOINTMENT (OUTPATIENT)
Dept: GENERAL RADIOLOGY | Facility: HOSPITAL | Age: 77
End: 2017-01-25

## 2017-01-25 ENCOUNTER — APPOINTMENT (OUTPATIENT)
Dept: CT IMAGING | Facility: HOSPITAL | Age: 77
End: 2017-01-25
Attending: EMERGENCY MEDICINE

## 2017-01-25 ENCOUNTER — ANTICOAGULATION VISIT (OUTPATIENT)
Dept: INTERNAL MEDICINE | Facility: CLINIC | Age: 77
End: 2017-01-25

## 2017-01-25 VITALS
RESPIRATION RATE: 20 BRPM | HEART RATE: 79 BPM | SYSTOLIC BLOOD PRESSURE: 120 MMHG | BODY MASS INDEX: 36.68 KG/M2 | DIASTOLIC BLOOD PRESSURE: 58 MMHG | HEIGHT: 63 IN | TEMPERATURE: 97.8 F | OXYGEN SATURATION: 99 % | WEIGHT: 207 LBS

## 2017-01-25 DIAGNOSIS — D64.9 ANEMIA, UNSPECIFIED TYPE: ICD-10-CM

## 2017-01-25 DIAGNOSIS — N39.0 ACUTE UTI: Primary | ICD-10-CM

## 2017-01-25 DIAGNOSIS — R55 NEAR SYNCOPE: ICD-10-CM

## 2017-01-25 LAB
ALBUMIN SERPL-MCNC: 3.3 G/DL (ref 3.5–5.2)
ALBUMIN/GLOB SERPL: 1 G/DL
ALP SERPL-CCNC: 137 U/L (ref 40–129)
ALT SERPL W P-5'-P-CCNC: 8 U/L (ref 5–33)
ANION GAP SERPL CALCULATED.3IONS-SCNC: 10.6 MMOL/L
AST SERPL-CCNC: 9 U/L (ref 5–32)
BACTERIA UR QL AUTO: ABNORMAL /HPF
BASOPHILS # BLD AUTO: 0.06 10*3/MM3 (ref 0–0.2)
BASOPHILS NFR BLD AUTO: 0.8 % (ref 0–2)
BILIRUB SERPL-MCNC: 0.3 MG/DL (ref 0.2–1.2)
BILIRUB UR QL STRIP: NEGATIVE
BUN BLD-MCNC: 21 MG/DL (ref 8–23)
BUN/CREAT SERPL: 12.2 (ref 7–25)
CALCIUM SPEC-SCNC: 8.7 MG/DL (ref 8.8–10.5)
CHLORIDE SERPL-SCNC: 95 MMOL/L (ref 98–107)
CLARITY UR: CLEAR
CO2 SERPL-SCNC: 29.4 MMOL/L (ref 22–29)
COLOR UR: YELLOW
CREAT BLD-MCNC: 1.72 MG/DL (ref 0.57–1)
D DIMER PPP FEU-MCNC: <0.3 MCGFEU/ML (ref 0–0.46)
DEPRECATED RDW RBC AUTO: 61.1 FL (ref 37–54)
EOSINOPHIL # BLD AUTO: 0.28 10*3/MM3 (ref 0.1–0.3)
EOSINOPHIL NFR BLD AUTO: 3.7 % (ref 0–4)
ERYTHROCYTE [DISTWIDTH] IN BLOOD BY AUTOMATED COUNT: 16.9 % (ref 11.5–14.5)
GFR SERPL CREATININE-BSD FRML MDRD: 29 ML/MIN/1.73
GLOBULIN UR ELPH-MCNC: 3.2 GM/DL
GLUCOSE BLD-MCNC: 175 MG/DL (ref 65–99)
GLUCOSE UR STRIP-MCNC: NEGATIVE MG/DL
HCT VFR BLD AUTO: 28.2 % (ref 37–47)
HGB BLD-MCNC: 9 G/DL (ref 12–16)
HGB UR QL STRIP.AUTO: ABNORMAL
HYALINE CASTS UR QL AUTO: ABNORMAL /LPF
IMM GRANULOCYTES # BLD: 0.03 10*3/MM3 (ref 0–0.03)
IMM GRANULOCYTES NFR BLD: 0.4 % (ref 0–0.5)
INR PPP: 1.99 (ref 0.9–1.1)
KETONES UR QL STRIP: NEGATIVE
LEUKOCYTE ESTERASE UR QL STRIP.AUTO: ABNORMAL
LYMPHOCYTES # BLD AUTO: 2.48 10*3/MM3 (ref 0.6–4.8)
LYMPHOCYTES NFR BLD AUTO: 32.8 % (ref 20–45)
MCH RBC QN AUTO: 31.4 PG (ref 27–31)
MCHC RBC AUTO-ENTMCNC: 31.9 G/DL (ref 31–37)
MCV RBC AUTO: 98.3 FL (ref 81–99)
MONOCYTES # BLD AUTO: 0.34 10*3/MM3 (ref 0–1)
MONOCYTES NFR BLD AUTO: 4.5 % (ref 3–8)
NEUTROPHILS # BLD AUTO: 4.38 10*3/MM3 (ref 1.5–8.3)
NEUTROPHILS NFR BLD AUTO: 57.8 % (ref 45–70)
NITRITE UR QL STRIP: NEGATIVE
NRBC BLD MANUAL-RTO: 0 /100 WBC (ref 0–0)
NT-PROBNP SERPL-MCNC: 523.3 PG/ML (ref 5–125)
PH UR STRIP.AUTO: 6 [PH] (ref 4.5–8)
PLATELET # BLD AUTO: 299 10*3/MM3 (ref 140–500)
PMV BLD AUTO: 11.7 FL (ref 7.4–10.4)
POTASSIUM BLD-SCNC: 4.6 MMOL/L (ref 3.5–5.2)
PROT SERPL-MCNC: 6.5 G/DL (ref 6–8.5)
PROT UR QL STRIP: NEGATIVE
PROTHROMBIN TIME: 22.9 SECONDS (ref 12.1–15)
RBC # BLD AUTO: 2.87 10*6/MM3 (ref 4.2–5.4)
RBC # UR: ABNORMAL /HPF
REF LAB TEST METHOD: ABNORMAL
SODIUM BLD-SCNC: 135 MMOL/L (ref 136–145)
SP GR UR STRIP: 1.01 (ref 1–1.03)
SQUAMOUS #/AREA URNS HPF: ABNORMAL /HPF
TROPONIN T SERPL-MCNC: <0.01 NG/ML (ref 0–0.03)
UROBILINOGEN UR QL STRIP: ABNORMAL
WBC CLUMPS # UR AUTO: ABNORMAL /HPF
WBC NRBC COR # BLD: 7.57 10*3/MM3 (ref 4.8–10.8)
WBC UR QL AUTO: ABNORMAL /HPF

## 2017-01-25 PROCEDURE — 99285 EMERGENCY DEPT VISIT HI MDM: CPT

## 2017-01-25 PROCEDURE — 80053 COMPREHEN METABOLIC PANEL: CPT | Performed by: EMERGENCY MEDICINE

## 2017-01-25 PROCEDURE — 85025 COMPLETE CBC W/AUTO DIFF WBC: CPT | Performed by: EMERGENCY MEDICINE

## 2017-01-25 PROCEDURE — 83880 ASSAY OF NATRIURETIC PEPTIDE: CPT | Performed by: EMERGENCY MEDICINE

## 2017-01-25 PROCEDURE — 84484 ASSAY OF TROPONIN QUANT: CPT | Performed by: EMERGENCY MEDICINE

## 2017-01-25 PROCEDURE — 87086 URINE CULTURE/COLONY COUNT: CPT | Performed by: EMERGENCY MEDICINE

## 2017-01-25 PROCEDURE — 85610 PROTHROMBIN TIME: CPT | Performed by: EMERGENCY MEDICINE

## 2017-01-25 PROCEDURE — 99284 EMERGENCY DEPT VISIT MOD MDM: CPT | Performed by: EMERGENCY MEDICINE

## 2017-01-25 PROCEDURE — 93010 ELECTROCARDIOGRAM REPORT: CPT | Performed by: INTERNAL MEDICINE

## 2017-01-25 PROCEDURE — 71020 HC CHEST PA AND LATERAL: CPT

## 2017-01-25 PROCEDURE — 81001 URINALYSIS AUTO W/SCOPE: CPT | Performed by: EMERGENCY MEDICINE

## 2017-01-25 PROCEDURE — 70450 CT HEAD/BRAIN W/O DYE: CPT

## 2017-01-25 PROCEDURE — 85379 FIBRIN DEGRADATION QUANT: CPT | Performed by: EMERGENCY MEDICINE

## 2017-01-25 PROCEDURE — 94640 AIRWAY INHALATION TREATMENT: CPT

## 2017-01-25 PROCEDURE — 87186 SC STD MICRODIL/AGAR DIL: CPT | Performed by: EMERGENCY MEDICINE

## 2017-01-25 PROCEDURE — 93005 ELECTROCARDIOGRAM TRACING: CPT | Performed by: EMERGENCY MEDICINE

## 2017-01-25 RX ORDER — ALBUTEROL SULFATE 2.5 MG/3ML
2.5 SOLUTION RESPIRATORY (INHALATION) ONCE
Status: COMPLETED | OUTPATIENT
Start: 2017-01-25 | End: 2017-01-25

## 2017-01-25 RX ORDER — ASPIRIN 325 MG
325 TABLET ORAL DAILY
Status: ON HOLD | COMMUNITY
End: 2018-10-25

## 2017-01-25 RX ORDER — CEFUROXIME AXETIL 250 MG/1
250 TABLET ORAL ONCE
Status: COMPLETED | OUTPATIENT
Start: 2017-01-25 | End: 2017-01-25

## 2017-01-25 RX ORDER — SODIUM CHLORIDE 0.9 % (FLUSH) 0.9 %
10 SYRINGE (ML) INJECTION AS NEEDED
Status: DISCONTINUED | OUTPATIENT
Start: 2017-01-25 | End: 2017-01-25 | Stop reason: HOSPADM

## 2017-01-25 RX ORDER — CEFUROXIME AXETIL 250 MG/1
250 TABLET ORAL 2 TIMES DAILY
Qty: 14 TABLET | Refills: 0 | Status: SHIPPED | OUTPATIENT
Start: 2017-01-25 | End: 2017-02-01

## 2017-01-25 RX ADMIN — ALBUTEROL SULFATE 2.5 MG: 2.5 SOLUTION RESPIRATORY (INHALATION) at 17:04

## 2017-01-25 RX ADMIN — CEFUROXIME AXETIL 250 MG: 250 TABLET ORAL at 17:46

## 2017-01-25 NOTE — ED PROVIDER NOTES
Subjective   Patient is a 76 y.o. female presenting with syncope.   History provided by:  Patient  Syncope   Episode history:  Multiple  Most recent episode:  Today  Timing:  Sporadic  Progression:  Unchanged  Chronicity:  New  Context comment:  Occurs when patient moves from lying to sitting or sitting to standing.  Is very brief.  Resolves within a few seconds.  No loss of consciousness.  Relieved by:  Sitting up  Worsened by:  Posture  Ineffective treatments:  None tried  Associated symptoms: no anxiety, no chest pain, no confusion, no diaphoresis, no difficulty breathing, no dizziness, no fever, no focal sensory loss, no focal weakness, no headaches, no malaise/fatigue, no nausea, no palpitations, no recent fall, no recent injury, no rectal bleeding, no seizures, no shortness of breath, no visual change, no vomiting and no weakness    Risk factors: no congenital heart disease, no coronary artery disease and no seizures    Risk factors comment:  History of TIA and CVA.  History of Bell's palsy.  History of hypertension and diabetes.    HPI Narrative:Ms. Singh is a 77 yo WF who presents secondary to lightheadedness onset this morning.  Patient initially described as dizziness noted on awakening this morning however when I ask her if she felt like she was spinning, the room was spinning or she was walking on the deck of a ship she said none of those I feel lightheaded like he might pass out when I stand up.  Patient also reports occupational therapy noted her oxygen saturation was low today.  Occupational therapy in turn called pt's home health nurse.  She to found patient's O2 sats to be low.  She too found pt's O2 sat low - in the upper 80's on room air at home. Nurse notified PMD who recommended patient come to ER for evaluation. Patient has history of sleep apnea.  She has never been a smoker although has been around smokers most of her life.          Review of Systems   Constitutional: Negative.  Negative for  appetite change, diaphoresis, fever and malaise/fatigue.   HENT: Negative.    Eyes: Negative.    Respiratory: Negative for cough, chest tightness, shortness of breath and wheezing.    Cardiovascular: Positive for syncope. Negative for chest pain, palpitations and leg swelling.   Gastrointestinal: Negative for nausea and vomiting.   Genitourinary: Negative.  Negative for difficulty urinating, flank pain, frequency and hematuria.   Musculoskeletal: Negative.    Skin: Negative.  Negative for color change, pallor and rash.   Neurological: Negative for dizziness, focal weakness, seizures, syncope, weakness and headaches.   Psychiatric/Behavioral: Negative.  Negative for agitation, behavioral problems, confusion and hallucinations.       Past Medical History   Diagnosis Date   • Allergic rhinitis    • Anxiety    • Arthritis    • Bell's palsy    • Depression    • Diabetes mellitus    • Disease of thyroid gland    • H/O blood clots    • Hyperlipidemia    • Hypertension    • Kidney disease    • AARON (obstructive sleep apnea)    • Stroke    • TIA (transient ischemic attack)        Allergies   Allergen Reactions   • Codeine    • Morphine And Related    • Penicillins        Past Surgical History   Procedure Laterality Date   • Back surgery     • Cholecystectomy     • Hysterectomy     • Spine surgery     • Upper gastrointestinal endoscopy  2014     gastritis.  done by dr. hastings   • Colonoscopy  2011     due for repeat in 2021       Family History   Problem Relation Age of Onset   • Lupus Mother    • Heart disease Other    • Hypertension Other        Social History     Social History   • Marital status:      Spouse name: N/A   • Number of children: N/A   • Years of education: N/A     Social History Main Topics   • Smoking status: Never Smoker   • Smokeless tobacco: Never Used   • Alcohol use No   • Drug use: No   • Sexual activity: Defer      Comment: EXERCISE - RARELY     Other Topics Concern   • None     Social History  Narrative           Objective   Physical Exam   Constitutional: She is oriented to person, place, and time. She appears well-developed and well-nourished. No distress.   76-year-old white female laying in bed.  She appears in fair overall health.  She has a right facial droop from Bell's palsy.  She is speaking full sentences without difficulty.  Oxygen saturation is in high 90s on 2 L.   HENT:   Head: Normocephalic and atraumatic.   Right Ear: External ear normal.   Left Ear: External ear normal.   Nose: Nose normal.   Mouth/Throat: Oropharynx is clear and moist.   Eyes: Conjunctivae and EOM are normal. Pupils are equal, round, and reactive to light.   Neck: Normal range of motion. Neck supple.   Cardiovascular: Normal rate, regular rhythm, normal heart sounds and intact distal pulses.  Exam reveals no gallop and no friction rub.    No murmur heard.  Pulmonary/Chest: Effort normal and breath sounds normal.   Abdominal: Soft. Bowel sounds are normal. She exhibits no distension. There is no tenderness.   Genitourinary: Rectal exam shows guaiac negative stool.   Musculoskeletal: Normal range of motion.   Neurological: She is alert and oriented to person, place, and time. A cranial nerve deficit (right facial droop secondary to Bell's palsy) is present.   Skin: Skin is warm and dry. She is not diaphoretic.   Psychiatric: She has a normal mood and affect. Her behavior is normal.   Nursing note and vitals reviewed.      ECG 12 Lead    Date/Time: 1/25/2017 3:01 PM  Performed by: JOSEFINA HOGAN  Authorized by: JOSEFINA HOGAN   Interpreted by physician  Comparison: compared with previous ECG from 11/8/2016  Similar to previous ECG  Rhythm: sinus rhythm  Rate: normal  QRS axis: left  Conduction: right bundle branch block and LAFB  ST Segments: ST segments normal  T depression: V1, V2, V3 and III  T flattening: V5, V4 and aVF  Other: no other findings  Clinical impression: abnormal ECG               ED Course  ED Course    Comment By Time   01/25/17  4:50 PM  Patient's workup significant for evidence of UTI as well as worsened anemia.  Head CT shows chronic changes but nothing acute.  Chest x-ray is unremarkable.  Patient's hemoglobin normally 10.  Today it is 9.0.  4+ bacteria and 13-20 WBCs in urine.  We'll place on antibiotics for UTI.  Patient's renal dysfunction is chronic.  Troponin and d-dimer are unremarkable.  EKG shows right bundle branch block and left anterior fascicular block.  No sign of ACS.Patient taken off O2.  With deep breaths her sats quickly rise to 99 on room air.  Will give patient an albuterol treatment.  Perhaps she has some mild bronchospasm. Teddy Winston MD 01/25 1652      Labs Reviewed   PROTIME-INR - Abnormal; Notable for the following:        Result Value    Protime 22.9 (*)     INR 1.99 (*)     All other components within normal limits    Narrative:     Therapeutic Ranges for INR: 2.0-3.0 (PT 20-30)                              2.5-3.5 (PT 25-34)   COMPREHENSIVE METABOLIC PANEL - Abnormal; Notable for the following:     Glucose 175 (*)     Creatinine 1.72 (*)     Sodium 135 (*)     Chloride 95 (*)     CO2 29.4 (*)     Calcium 8.7 (*)     Albumin 3.30 (*)     Alkaline Phosphatase 137 (*)     eGFR Non  Amer 29 (*)     All other components within normal limits    Narrative:     The MDRD GFR formula is only valid for adults with stable renal function between ages 18 and 70.   BNP (IN-HOUSE) - Abnormal; Notable for the following:     proBNP 523.3 (*)     All other components within normal limits    Narrative:     Among patients with dyspnea, NT-proBNP is highly sensitive for the detection of acute congestive heart failure. In addition NT-proBNP of <300 pg/ml effectively rules out acute congestive heart failure with 99% negative predictive value.   URINALYSIS W/ CULTURE IF INDICATED - Abnormal; Notable for the following:     Blood, UA Trace (*)     Leuk Esterase, UA Small (1+) (*)     All other  components within normal limits   CBC WITH AUTO DIFFERENTIAL - Abnormal; Notable for the following:     RBC 2.87 (*)     Hemoglobin 9.0 (*)     Hematocrit 28.2 (*)     MCH 31.4 (*)     RDW 16.9 (*)     RDW-SD 61.1 (*)     MPV 11.7 (*)     All other components within normal limits   URINALYSIS, MICROSCOPIC ONLY - Abnormal; Notable for the following:     RBC, UA 0-2 (*)     WBC, UA 13-20 (*)     Bacteria, UA 4+ (*)     All other components within normal limits   TROPONIN (IN-HOUSE) - Normal    Narrative:     Troponin T Reference Ranges:  Less than 0.03 ng/mL:    Negative for AMI  0.03 to 0.09 ng/mL:      Indeterminant for AMI  Greater than 0.09 ng/mL: Positive for AMI   D-DIMER, QUANTITATIVE - Normal    Narrative:     Can be elevated in, but is not diagnostic for deep vein thrombosis (DVT) or pulmonary embolis (PE).  It is also elevated in other medical conditions.  Clinical correlation is required.  The negative cut-off value for the D-Dimer is 0.50 mcg FEU/mL for DVT and PE.   URINE CULTURE   CBC AND DIFFERENTIAL    Narrative:     The following orders were created for panel order CBC & Differential.  Procedure                               Abnormality         Status                     ---------                               -----------         ------                     CBC Auto Differential[[id number]]         Abnormal            Final result                 Please view results for these tests on the individual orders.     Xr Chest 2 View    Result Date: 1/25/2017  Narrative: INDICATION:  Shortness of air density for 1 day.  COMPARISON:  11/08/2016  FINDINGS: PA and lateral views of the chest.  Heart and mediastinal contours are normal.  The lungs are clear.  No pneumothorax or pleural effusion.      Impression: Normal chest radiograph.  This report was finalized on 1/25/2017 4:24 PM by Dr. Davi Herbert MD.      Ct Head Without Contrast    Result Date: 1/25/2017  Narrative: INDICATION: Dizziness for one day.   TECHNIQUE: CT head without contrast.  Radiation dose reduction techniques were utilized, including automated exposure control and exposure modulation based on body size.  COMPARISON: CT head 11/08/2016.  FINDINGS: No acute intracranial hemorrhage, mass lesion, or acute infarct. There is generalized global cerebral atrophy and chronic small vessel changes. The ventricles and basilar cisterns are normal in size and configuration. No extra-axial collections.  No acute osseous abnormalities. The visualized paranasal sinuses and mastoid air cells are clear.      Impression:  1. No acute intracranial findings. 2. Generalized atrophy and chronic small vessel changes  This report was finalized on 1/25/2017 4:06 PM by Dr. Davi Herbert MD.              MDM  Number of Diagnoses or Management Options  Acute UTI: new and requires workup  Anemia, unspecified type: established and worsening  Near syncope: new and requires workup     Amount and/or Complexity of Data Reviewed  Clinical lab tests: ordered and reviewed  Tests in the radiology section of CPT®: ordered and reviewed  Tests in the medicine section of CPT®: ordered and reviewed    Risk of Complications, Morbidity, and/or Mortality  Presenting problems: moderate  Diagnostic procedures: moderate  Management options: moderate    Patient Progress  Patient progress: stable      Final diagnoses:   Acute UTI   Near syncope   Anemia, unspecified type            Teddy Winston MD  01/26/17 0100

## 2017-01-25 NOTE — DISCHARGE INSTRUCTIONS
Medications directed.  Recommend rise slowly to sitting or standing position.  Follow-up with Dr. Mercado next week for further evaluation of anemia  Sooner if needed.  Return to ED for medical emergencies.    Hypertension  Hypertension, commonly called high blood pressure, is when the force of blood pumping through your arteries is too strong. Your arteries are the blood vessels that carry blood from your heart throughout your body. A blood pressure reading consists of a higher number over a lower number, such as 110/72. The higher number (systolic) is the pressure inside your arteries when your heart pumps. The lower number (diastolic) is the pressure inside your arteries when your heart relaxes. Ideally you want your blood pressure below 120/80.  Hypertension forces your heart to work harder to pump blood. Your arteries may become narrow or stiff. Having untreated or uncontrolled hypertension can cause heart attack, stroke, kidney disease, and other problems.  RISK FACTORS  Some risk factors for high blood pressure are controllable. Others are not.   Risk factors you cannot control include:   · Race. You may be at higher risk if you are .  · Age. Risk increases with age.  · Gender. Men are at higher risk than women before age 45 years. After age 65, women are at higher risk than men.  Risk factors you can control include:  · Not getting enough exercise or physical activity.  · Being overweight.  · Getting too much fat, sugar, calories, or salt in your diet.  · Drinking too much alcohol.  SIGNS AND SYMPTOMS  Hypertension does not usually cause signs or symptoms. Extremely high blood pressure (hypertensive crisis) may cause headache, anxiety, shortness of breath, and nosebleed.  DIAGNOSIS  To check if you have hypertension, your health care provider will measure your blood pressure while you are seated, with your arm held at the level of your heart. It should be measured at least twice using the same  arm. Certain conditions can cause a difference in blood pressure between your right and left arms. A blood pressure reading that is higher than normal on one occasion does not mean that you need treatment. If it is not clear whether you have high blood pressure, you may be asked to return on a different day to have your blood pressure checked again. Or, you may be asked to monitor your blood pressure at home for 1 or more weeks.  TREATMENT  Treating high blood pressure includes making lifestyle changes and possibly taking medicine. Living a healthy lifestyle can help lower high blood pressure. You may need to change some of your habits.  Lifestyle changes may include:  · Following the DASH diet. This diet is high in fruits, vegetables, and whole grains. It is low in salt, red meat, and added sugars.  · Keep your sodium intake below 2,300 mg per day.  · Getting at least 30-45 minutes of aerobic exercise at least 4 times per week.  · Losing weight if necessary.  · Not smoking.  · Limiting alcoholic beverages.  · Learning ways to reduce stress.  Your health care provider may prescribe medicine if lifestyle changes are not enough to get your blood pressure under control, and if one of the following is true:  · You are 18-59 years of age and your systolic blood pressure is above 140.  · You are 60 years of age or older, and your systolic blood pressure is above 150.  · Your diastolic blood pressure is above 90.  · You have diabetes, and your systolic blood pressure is over 140 or your diastolic blood pressure is over 90.  · You have kidney disease and your blood pressure is above 140/90.  · You have heart disease and your blood pressure is above 140/90.  Your personal target blood pressure may vary depending on your medical conditions, your age, and other factors.  HOME CARE INSTRUCTIONS  · Have your blood pressure rechecked as directed by your health care provider.    · Take medicines only as directed by your health  care provider. Follow the directions carefully. Blood pressure medicines must be taken as prescribed. The medicine does not work as well when you skip doses. Skipping doses also puts you at risk for problems.  · Do not smoke.    · Monitor your blood pressure at home as directed by your health care provider.   SEEK MEDICAL CARE IF:   · You think you are having a reaction to medicines taken.  · You have recurrent headaches or feel dizzy.  · You have swelling in your ankles.  · You have trouble with your vision.  SEEK IMMEDIATE MEDICAL CARE IF:  · You develop a severe headache or confusion.  · You have unusual weakness, numbness, or feel faint.  · You have severe chest or abdominal pain.  · You vomit repeatedly.  · You have trouble breathing.  MAKE SURE YOU:   · Understand these instructions.  · Will watch your condition.  · Will get help right away if you are not doing well or get worse.     This information is not intended to replace advice given to you by your health care provider. Make sure you discuss any questions you have with your health care provider.     Document Released: 12/18/2006 Document Revised: 05/03/2016 Document Reviewed: 10/10/2014  [employer] Patient Education ©2016 [employer]

## 2017-01-25 NOTE — ED NOTES
Pt's sats continue to drop to 80's on room air.  Sats improve to 97-99 when she is taking deep breaths.  Dr Winston aware.     Idalmis Ojeda RN  01/25/17 1732       Idalmis Ojeda RN  01/25/17 1739

## 2017-01-27 LAB — BACTERIA SPEC AEROBE CULT: ABNORMAL

## 2017-01-31 ENCOUNTER — OFFICE VISIT (OUTPATIENT)
Dept: INTERNAL MEDICINE | Facility: CLINIC | Age: 77
End: 2017-01-31

## 2017-01-31 VITALS
SYSTOLIC BLOOD PRESSURE: 130 MMHG | DIASTOLIC BLOOD PRESSURE: 78 MMHG | HEIGHT: 63 IN | HEART RATE: 89 BPM | OXYGEN SATURATION: 98 %

## 2017-01-31 DIAGNOSIS — Z79.4 TYPE 2 DIABETES MELLITUS WITH DIABETIC AUTONOMIC NEUROPATHY, WITH LONG-TERM CURRENT USE OF INSULIN (HCC): ICD-10-CM

## 2017-01-31 DIAGNOSIS — M79.605 LEFT LEG PAIN: Primary | ICD-10-CM

## 2017-01-31 DIAGNOSIS — E11.43 TYPE 2 DIABETES MELLITUS WITH DIABETIC AUTONOMIC NEUROPATHY, WITH LONG-TERM CURRENT USE OF INSULIN (HCC): ICD-10-CM

## 2017-01-31 DIAGNOSIS — N39.0 URINARY TRACT INFECTION, SITE UNSPECIFIED: ICD-10-CM

## 2017-01-31 PROCEDURE — 99214 OFFICE O/P EST MOD 30 MIN: CPT | Performed by: INTERNAL MEDICINE

## 2017-01-31 NOTE — MR AVS SNAPSHOT
Joya Singh   1/31/2017 4:00 PM   Office Visit    Dept Phone:  [phone]   Encounter #:  [id number]    Provider:  Chari Mercado MD   Department:  [facility] INTERNAL MED AND PEDS                Your Full Care Plan              Your Updated Medication List          This list is accurate as of: 1/31/17  5:30 PM.  Always use your most recent med list.                ACCU-CHEK FASTCLIX LANCETS misc   1 each 3 (three) times a day. TEST THREE TIMES DAILY TO FOUR TIMES DAILY AS DIRECTED       ACCU-CHEK KENNETH SMARTVIEW W/DEVICE kit       acetaminophen 500 MG tablet   Commonly known as:  TYLENOL       * aspirin 81 MG tablet       * aspirin 325 MG tablet       atorvastatin 10 MG tablet   Commonly known as:  LIPITOR   Take one (1) tablet orally (by mouth) once daily       B-D SINGLE USE SWABS REGULAR pads       buPROPion  MG 12 hr tablet   Commonly known as:  WELLBUTRIN SR       CALCIUM PLUS VITAMIN D3 PO       cefuroxime 250 MG tablet   Commonly known as:  CEFTIN   Take 1 tablet by mouth 2 (Two) Times a Day for 7 days.       cetirizine 10 MG tablet   Commonly known as:  zyrTEC       cyclobenzaprine 10 MG tablet   Commonly known as:  FLEXERIL   Take one (1) tablet orally (by mouth) three times daily as needed       diphenhydrAMINE 25 mg capsule   Commonly known as:  BENADRYL       DULoxetine 60 MG capsule   Commonly known as:  CYMBALTA   Take 1 capsule by mouth Daily.       furosemide 20 MG tablet   Commonly known as:  LASIX   Take one (1) tablet orally (by mouth) daily as directed       gabapentin 400 MG capsule   Commonly known as:  NEURONTIN   Take 1 in am 1 afternoon  and 2 at bedtime       glucose blood test strip   Commonly known as:  ACCU-CHEK SMARTVIEW   Accu-Chek SmartView In Vitro Strip TEST THREE TIMES DAILY  TO FOUR TIMES DAILY AS DIRECTED       insulin aspart 100 UNIT/ML solution pen-injector sc pen   Commonly known as:  NOVOLOG FLEXPEN   Inject 20 Units under  "the skin 3 (Three) Times a Day With Meals.       Insulin Degludec 200 UNIT/ML solution pen-injector   Commonly known as:  TRESIBA FLEXTOUCH   Inject 70 Units under the skin Every Night.       LEVEMIR FLEXTOUCH 100 UNIT/ML injection   Generic drug:  insulin detemir       levothyroxine 50 MCG tablet   Commonly known as:  SYNTHROID, LEVOTHROID   Take 1 tablet by mouth Daily.       lisinopril 5 MG tablet   Commonly known as:  PRINIVIL,ZESTRIL   Take one (1) tablet orally (by mouth) daily       metoprolol tartrate 25 MG tablet   Commonly known as:  LOPRESSOR   Take 1/2 tablet orally (by mouth) twice daily       Needle (Disp) 30G X 1/2\" misc   Commonly known as:  BD DISP NEEDLES   To be used 3 times daily with Novolog Flexpen.       omeprazole 40 MG capsule   Commonly known as:  priLOSEC   Take 1 capsule by mouth Daily.       potassium chloride 10 MEQ CR tablet   Commonly known as:  K-DUR   Take one (1) tablet orally (by mouth) daily       QUEtiapine 50 MG tablet   Commonly known as:  SEROquel   Take one (1) tablet orally (by mouth) each night at bedtime       traMADol 50 MG tablet   Commonly known as:  ULTRAM   Take 1 tablet by mouth Every 8 (Eight) Hours As Needed for moderate pain (4-6).       traZODone 100 MG tablet   Commonly known as:  DESYREL       ULTICARE MICRO PEN NEEDLES 32G X 4 MM misc   Generic drug:  Insulin Pen Needle       warfarin 5 MG tablet   Commonly known as:  COUMADIN   Alternate 1 tablet by mouth one evening and then 1 1/2 tab by mouth the next evening       * Notice:  This list has 2 medication(s) that are the same as other medications prescribed for you. Read the directions carefully, and ask your doctor or other care provider to review them with you.            You Were Diagnosed With        Codes Comments    Left leg pain    -  Primary ICD-10-CM: M79.605  ICD-9-CM: 729.5     Urinary tract infection, site unspecified     ICD-10-CM: N39.0     Type 2 diabetes mellitus with diabetic autonomic " neuropathy, with long-term current use of insulin     ICD-10-CM: E11.43, Z79.4  ICD-9-CM: 250.60, 337.1, V58.67       Instructions     None    Patient Instructions History      Upcoming Appointments     Visit Type Date Time Department    ACUTE           2017  4:00 PM [facility]    US LAG ANKLE / BRACH IND EXT COMP 2017 10:30 AM [facility] ULTRASOUND    US LAG UPPER EXT ARTERIES BILAT 2017 11:00 AM [facility] ULTRASOUND    FOLLOW UP 2017  8:45 AM [facility]    [facility] 2017  9:30 AM [facility]    FOLLOW UP 2017  8:00 AM [facility]    FOLLOW UP SLEEP CLINIC 3/28/2017 10:45 AM [facility] SLEEP LAB      MyChart Signup     [facility] [employer] allows you to send messages to your doctor, view your test results, renew your prescriptions, schedule appointments, and more. To sign up, go to [email or web address] and click on the Sign Up Now link in the New User? box. Enter your [employer] Activation Code exactly as it appears below along with the last four digits of your Social Security Number and your Date of Birth () to complete the sign-up process. If you do not sign up before the expiration date, you must request a new code.    [employer] Activation Code: [email or web address]  Expires: 2017  9:55 AM    If you have questions, you can email [email or web address] or call [phone] to talk to our [employer] staff. Remember, [employer] is NOT to be used for urgent needs. For medical emergencies, dial 911.               Other Info from Your Visit           Your Appointments     2017 10:30 AM EST   US lag ankle / brach ind ext comp with LAG US 1   [facility]TR[facility] ([place])    [address]  [place] [id number] [phone]           No prep. Arrive 15 minutes before your exam. Bring a current list of medications.            2017 11:00 AM EST   US lag upper ext arteries bilat with LAG US 1   [facility]  "ULTRASOUND ([place])    [address]  [place] [address] [phone]           No prep. Arrive 15 minutes before your exam. Bring a current list of medications.            Feb 21, 2017  8:45 AM EST   Follow Up with German Wylie MD   [facility] ORTHOPEDICS (--)    [address]  [place] [address] [phone]           Arrive 15 minutes prior to appointment.            Feb 21, 2017  9:30 AM EST   [facility] with [facility] ROSALIND REA   [facility] INTERNAL MED AND PEDS (--)    [address]  [place] [address] [phone]            Feb 28, 2017  8:00 AM EST   Follow Up with Chari Rea MD   [facility] INTERNAL MED AND PEDS (--)    [address]  [place] [address] [phone]           Arrive 15 minutes prior to appointment.              Allergies     Morphine And Related      Codeine  Itching, Rash    Morphine  Rash    Penicillins  Rash      Reason for Visit     Follow-up Went to ER, had neb treatment and also dx'd with UTI. Currently on Cefuroxime.    PT Progress Note Patient currently in PT.       Vital Signs     Blood Pressure Pulse Height Oxygen Saturation Smoking Status       130/78 (BP Location: Left arm, Patient Position: Sitting, Cuff Size: Adult) 89 62.5\" (158.8 cm) 98% Never Smoker       Problems and Diagnoses Noted     Type 2 diabetes mellitus with neurologic complication    Urinary tract infection    Left leg pain    -  Primary        "

## 2017-01-31 NOTE — PROGRESS NOTES
Subjective     Joya Singh is a 76 y.o. female, who presents with a chief complaint of   Chief Complaint   Patient presents with   • Follow-up     Went to ER, had neb treatment and also dx'd with UTI. Currently on Cefuroxime.   • PT Progress Note     Patient currently in PT.        HPI   The pt is here for f/u from an ER visit.  She was orthostatic an d passed out.  She was diagnosed with a UTI and treated with cefuroxime.  Urine cx was positive.  Her O2 levels were low per home health.  In the ER troponin and d-dimer were both negative.  ekg was unchanged.  With deep breaths her O2 sats were 99% on room air.  She has an appt with sleep medicine in March.      Left leg pain.  She has arterial studies of her left lower leg scheduled for tomorrow.  Her leg pain is worse with weight bearing and walking.  Physical therapy has not improved her leg pain.  Over the weekend she fell on her leg as she was trying to get in the car.      The pt reports her glucoses have been better controlled the past few days.      INR was 1.99 yest.  She is taking 5mg and 7.5mg alternating doses.     The following portions of the patient's history were reviewed and updated as appropriate: allergies, current medications, past family history, past medical history, past social history, past surgical history and problem list.    Allergies: Morphine and related; Codeine; Morphine; and Penicillins    Review of Systems   Constitutional: Negative.    HENT: Negative.    Eyes: Negative.    Respiratory: Negative.    Cardiovascular: Negative.    Gastrointestinal: Negative.    Endocrine: Negative.    Genitourinary: Negative.    Musculoskeletal: Negative.         Left leg pain   Skin: Negative.    Allergic/Immunologic: Negative.    Neurological: Negative.    Hematological: Negative.    Psychiatric/Behavioral: Negative.    All other systems reviewed and are negative.      Objective     Wt Readings from Last 3 Encounters:   01/25/17 207 lb (93.9 kg)    01/24/17 207 lb 6.4 oz (94.1 kg)   12/21/16 209 lb (94.8 kg)     Temp Readings from Last 3 Encounters:   01/25/17 97.8 °F (36.6 °C)   11/08/16 97.8 °F (36.6 °C)   07/19/16 98.6 °F (37 °C)     BP Readings from Last 3 Encounters:   01/31/17 130/78   01/25/17 120/58   01/24/17 130/84     Pulse Readings from Last 3 Encounters:   01/31/17 89   01/25/17 79   01/24/17 96     There is no height or weight on file to calculate BMI.  SpO2 Readings from Last 3 Encounters:   01/31/17 98%   01/25/17 99%   01/24/17 95%       Physical Exam   Constitutional: She is oriented to person, place, and time. She appears well-developed and well-nourished. No distress.   HENT:   Head: Normocephalic and atraumatic.   Right Ear: External ear normal.   Left Ear: External ear normal.   Nose: Nose normal.   Mouth/Throat: Oropharynx is clear and moist.   Eyes: Conjunctivae and EOM are normal. Pupils are equal, round, and reactive to light.   Neck: Normal range of motion. Neck supple.   Cardiovascular: Normal rate, regular rhythm, normal heart sounds and intact distal pulses.    Pulmonary/Chest: Effort normal and breath sounds normal. No respiratory distress. She has no wheezes.   Musculoskeletal: Normal range of motion.   Normal gait   Neurological: She is alert and oriented to person, place, and time.   Skin: Skin is warm and dry.   Psychiatric: She has a normal mood and affect. Her behavior is normal. Judgment and thought content normal.   Nursing note and vitals reviewed.      1/25/17 Head ct - no acute findings       Results for orders placed or performed during the hospital encounter of 01/25/17   Urine Culture   Result Value Ref Range    Urine Culture (A)      >100,000 CFU/mL Klebsiella pneumoniae ssp pneumoniae       Susceptibility    Klebsiella pneumoniae ssp pneumoniae - NEEMA     Ampicillin  Resistant ug/ml     Ampicillin + Sulbactam 4 Susceptible ug/ml     Cefazolin <=4 Susceptible ug/ml     Cefepime <=1 Susceptible ug/ml      Ceftriaxone <=1 Susceptible ug/ml     Ciprofloxacin <=0.25 Susceptible ug/ml     Ertapenem <=0.5 Susceptible ug/ml     Gentamicin <=1 Susceptible ug/ml     Levofloxacin <=0.12 Susceptible ug/ml     Nitrofurantoin 64 Intermediate ug/ml     Piperacillin + Tazobactam <=4 Susceptible ug/ml     Tetracycline <=1 Susceptible ug/ml     Trimethoprim + Sulfamethoxazole <=20 Susceptible ug/ml   Protime-INR   Result Value Ref Range    Protime 22.9 (H) 12.1 - 15.0 Seconds    INR 1.99 (H) 0.90 - 1.10   Comprehensive Metabolic Panel   Result Value Ref Range    Glucose 175 (H) 65 - 99 mg/dL    BUN 21 8 - 23 mg/dL    Creatinine 1.72 (H) 0.57 - 1.00 mg/dL    Sodium 135 (L) 136 - 145 mmol/L    Potassium 4.6 3.5 - 5.2 mmol/L    Chloride 95 (L) 98 - 107 mmol/L    CO2 29.4 (H) 22.0 - 29.0 mmol/L    Calcium 8.7 (L) 8.8 - 10.5 mg/dL    Total Protein 6.5 6.0 - 8.5 g/dL    Albumin 3.30 (L) 3.50 - 5.20 g/dL    ALT (SGPT) 8 5 - 33 U/L    AST (SGOT) 9 5 - 32 U/L    Alkaline Phosphatase 137 (H) 40 - 129 U/L    Total Bilirubin 0.3 0.2 - 1.2 mg/dL    eGFR Non African Amer 29 (L) >60 mL/min/1.73    Globulin 3.2 gm/dL    A/G Ratio 1.0 g/dL    BUN/Creatinine Ratio 12.2 7.0 - 25.0    Anion Gap 10.6 mmol/L   BNP   Result Value Ref Range    proBNP 523.3 (H) 5.0 - 125.0 pg/mL   Urinalysis With / Culture If Indicated   Result Value Ref Range    Color, UA Yellow Yellow, Straw    Appearance, UA Clear Clear    pH, UA 6.0 4.5 - 8.0    Specific Gravity, UA 1.010 1.003 - 1.030    Glucose, UA Negative Negative    Ketones, UA Negative Negative, 80 mg/dL (3+), >=160 mg/dL (4+)    Bilirubin, UA Negative Negative    Blood, UA Trace (A) Negative    Protein, UA Negative Negative    Leuk Esterase, UA Small (1+) (A) Negative    Nitrite, UA Negative Negative    Urobilinogen, UA 0.2 E.U./dL 0.2 - 1.0 E.U./dL   Troponin   Result Value Ref Range    Troponin T <0.010 0.000 - 0.030 ng/mL   D-dimer, Quantitative   Result Value Ref Range    D-Dimer, Quantitative <0.30 0.00 -  0.46 MCGFEU/mL   CBC Auto Differential   Result Value Ref Range    WBC 7.57 4.80 - 10.80 10*3/mm3    RBC 2.87 (L) 4.20 - 5.40 10*6/mm3    Hemoglobin 9.0 (L) 12.0 - 16.0 g/dL    Hematocrit 28.2 (L) 37.0 - 47.0 %    MCV 98.3 81.0 - 99.0 fL    MCH 31.4 (H) 27.0 - 31.0 pg    MCHC 31.9 31.0 - 37.0 g/dL    RDW 16.9 (H) 11.5 - 14.5 %    RDW-SD 61.1 (H) 37.0 - 54.0 fl    MPV 11.7 (H) 7.4 - 10.4 fL    Platelets 299 140 - 500 10*3/mm3    Neutrophil % 57.8 45.0 - 70.0 %    Lymphocyte % 32.8 20.0 - 45.0 %    Monocyte % 4.5 3.0 - 8.0 %    Eosinophil % 3.7 0.0 - 4.0 %    Basophil % 0.8 0.0 - 2.0 %    Immature Grans % 0.4 0.0 - 0.5 %    Neutrophils, Absolute 4.38 1.50 - 8.30 10*3/mm3    Lymphocytes, Absolute 2.48 0.60 - 4.80 10*3/mm3    Monocytes, Absolute 0.34 0.00 - 1.00 10*3/mm3    Eosinophils, Absolute 0.28 0.10 - 0.30 10*3/mm3    Basophils, Absolute 0.06 0.00 - 0.20 10*3/mm3    Immature Grans, Absolute 0.03 0.00 - 0.03 10*3/mm3    nRBC 0.0 0.0 - 0.0 /100 WBC   Urinalysis, Microscopic Only   Result Value Ref Range    RBC, UA 0-2 (A) None Seen /HPF    WBC, UA 13-20 (A) None Seen /HPF    Bacteria, UA 4+ (A) None Seen /HPF    Squamous Epithelial Cells, UA 0-2 None Seen, 0-2 /HPF    Hyaline Casts, UA None Seen None Seen /LPF    WBC Clumps, UA Small/1+ None Seen /HPF    Methodology Manual Light Microscopy        Assessment/Plan   Joya was seen today for follow-up and pt progress note.    Diagnoses and all orders for this visit:    Left leg pain - arterial studies ordered tomorrow.  Diff includes vascular insufficiency, stress fx.  Lower on list is osteomyelitis.  No fever but pt has been on abx for poss cellulitis and recent UTI.    -     XR Tibia Fibula 2 View Left; Future  -     XR Knee 4+ View Left; Future    Urinary tract infection, site unspecified - finish abx.  Sx improved.      Type 2 diabetes mellitus with diabetic autonomic neuropathy, with long-term current use of insulin - cont close management    Warfarin management -  cont same dose and recheck in 4 weeks.      Intermittent low o2 sats - d-dimer negative, LE dopplers also neg.  Very low probability of PE.        Outpatient Medications Prior to Visit   Medication Sig Dispense Refill   • ACCU-CHEK FASTCLIX LANCETS misc 1 each 3 (three) times a day. TEST THREE TIMES DAILY TO FOUR TIMES DAILY AS DIRECTED 102 each 11   • acetaminophen (TYLENOL) 500 MG tablet Take by mouth.     • Alcohol Swabs (B-D SINGLE USE SWABS REGULAR) pads      • aspirin 325 MG tablet Take 325 mg by mouth Daily.     • aspirin 81 MG tablet Take 1 tablet by mouth daily.     • atorvastatin (LIPITOR) 10 MG tablet Take one (1) tablet orally (by mouth) once daily 90 tablet 1   • Blood Glucose Monitoring Suppl (ACCU-CHEK KENNETH SMARTVIEW) W/DEVICE kit USE AS DIRECTED     • buPROPion SR (WELLBUTRIN SR) 150 MG 12 hr tablet Take 1 tablet by mouth Every Night.     • Calcium Carb-Cholecalciferol (CALCIUM PLUS VITAMIN D3 PO) Take by mouth.     • cefuroxime (CEFTIN) 250 MG tablet Take 1 tablet by mouth 2 (Two) Times a Day for 7 days. 14 tablet 0   • cetirizine (ZyrTEC) 10 MG tablet Take by mouth.     • cyclobenzaprine (FLEXERIL) 10 MG tablet Take one (1) tablet orally (by mouth) three times daily as needed 90 tablet 0   • diphenhydrAMINE (BENADRYL) 25 mg capsule Take 25 mg by mouth Every 6 (Six) Hours As Needed for itching (take with norco).     • DULoxetine (CYMBALTA) 60 MG capsule Take 1 capsule by mouth Daily. 90 capsule 1   • furosemide (LASIX) 20 MG tablet Take one (1) tablet orally (by mouth) daily as directed 90 tablet 1   • gabapentin (NEURONTIN) 400 MG capsule Take 1 in am 1 afternoon  and 2 at bedtime 120 capsule 5   • glucose blood (ACCU-CHEK SMARTVIEW) test strip Accu-Chek SmartView In Vitro Strip TEST THREE TIMES DAILY  TO FOUR TIMES DAILY AS DIRECTED 300 each 3   • insulin aspart (NOVOLOG FLEXPEN) 100 UNIT/ML solution pen-injector sc pen Inject 20 Units under the skin 3 (Three) Times a Day With Meals. 10 pen 1   •  "Insulin Degludec (TRESIBA FLEXTOUCH) 200 UNIT/ML solution pen-injector Inject 70 Units under the skin Every Night. 9 mL 6   • LEVEMIR FLEXTOUCH 100 UNIT/ML injection      • levothyroxine (SYNTHROID, LEVOTHROID) 50 MCG tablet Take 1 tablet by mouth Daily. 90 tablet 1   • lisinopril (PRINIVIL,ZESTRIL) 5 MG tablet Take one (1) tablet orally (by mouth) daily 30 tablet 6   • metoprolol tartrate (LOPRESSOR) 25 MG tablet Take 1/2 tablet orally (by mouth) twice daily 90 tablet 1   • Needle, Disp, (BD DISP NEEDLES) 30G X 1/2\" misc To be used 3 times daily with Novolog Flexpen. 100 each 5   • omeprazole (priLOSEC) 40 MG capsule Take 1 capsule by mouth Daily. 90 capsule 1   • potassium chloride (K-DUR) 10 MEQ CR tablet Take one (1) tablet orally (by mouth) daily 90 tablet 2   • QUEtiapine (SEROquel) 50 MG tablet Take one (1) tablet orally (by mouth) each night at bedtime 30 tablet 6   • traMADol (ULTRAM) 50 MG tablet Take 1 tablet by mouth Every 8 (Eight) Hours As Needed for moderate pain (4-6). 60 tablet 0   • traZODone (DESYREL) 100 MG tablet Take 2 tablets by mouth Every Night.     • ULTICARE MICRO PEN NEEDLES 32G X 4 MM misc      • warfarin (COUMADIN) 5 MG tablet Alternate 1 tablet by mouth one evening and then 1 1/2 tab by mouth the next evening 60 tablet 1     No facility-administered medications prior to visit.      No orders of the defined types were placed in this encounter.    There are no discontinued medications.      Return in about 4 weeks (around 2/28/2017) for Recheck.  "

## 2017-02-01 ENCOUNTER — HOSPITAL ENCOUNTER (OUTPATIENT)
Dept: ULTRASOUND IMAGING | Facility: HOSPITAL | Age: 77
End: 2017-02-01
Attending: INTERNAL MEDICINE

## 2017-02-01 ENCOUNTER — HOSPITAL ENCOUNTER (OUTPATIENT)
Dept: ULTRASOUND IMAGING | Facility: HOSPITAL | Age: 77
Discharge: HOME | End: 2017-02-01
Attending: INTERNAL MEDICINE | Admitting: INTERNAL MEDICINE

## 2017-02-01 DIAGNOSIS — E08.59 DIABETES MELLITUS DUE TO UNDERLYING CONDITION WITH OTHER CIRCULATORY COMPLICATIONS (HCC): ICD-10-CM

## 2017-02-01 DIAGNOSIS — E11.59 TYPE 2 DIABETES MELLITUS WITH OTHER CIRCULATORY COMPLICATIONS (HCC): ICD-10-CM

## 2017-02-01 DIAGNOSIS — M79.605 PAIN OF LEFT LOWER EXTREMITY: ICD-10-CM

## 2017-02-01 DIAGNOSIS — M79.606 PAIN OF LOWER EXTREMITY, UNSPECIFIED LATERALITY: Primary | ICD-10-CM

## 2017-02-01 DIAGNOSIS — E78.2 MIXED HYPERLIPIDEMIA: ICD-10-CM

## 2017-02-01 DIAGNOSIS — I10 ESSENTIAL HYPERTENSION: ICD-10-CM

## 2017-02-01 DIAGNOSIS — I25.10 CHRONIC CORONARY ARTERY DISEASE: ICD-10-CM

## 2017-02-01 DIAGNOSIS — M79.605 LEFT LEG PAIN: ICD-10-CM

## 2017-02-01 PROCEDURE — 93923 UPR/LXTR ART STDY 3+ LVLS: CPT

## 2017-02-03 ENCOUNTER — TELEPHONE (OUTPATIENT)
Dept: INTERNAL MEDICINE | Facility: CLINIC | Age: 77
End: 2017-02-03

## 2017-02-03 NOTE — TELEPHONE ENCOUNTER
----- Message from Ruchi Willis sent at 2/3/2017  8:53 AM EST -----  Regarding: SIGN ORDER  :  [birth year]    ÁLVARO PATIENT    [facility] CALLED AND SAID PATIENT IS SCHEDULED Monday AT 10:30 FOR A DOPPLER. PLEASE SIGN HER NEW ORDER.      THIS WAS TAKEN CARE OF BY SHANIQUE PT ALREADY  HAD TEST DONE ON 18  HER AT [facility]

## 2017-02-06 ENCOUNTER — HOSPITAL ENCOUNTER (OUTPATIENT)
Dept: ULTRASOUND IMAGING | Facility: HOSPITAL | Age: 77
End: 2017-02-06
Attending: INTERNAL MEDICINE

## 2017-02-07 ENCOUNTER — LAB REQUISITION (OUTPATIENT)
Dept: LAB | Facility: HOSPITAL | Age: 77
End: 2017-02-07

## 2017-02-07 DIAGNOSIS — N39.0 URINARY TRACT INFECTION: ICD-10-CM

## 2017-02-07 LAB
ANION GAP SERPL CALCULATED.3IONS-SCNC: 10.3 MMOL/L
BASOPHILS # BLD AUTO: 0.12 10*3/MM3 (ref 0–0.2)
BASOPHILS NFR BLD AUTO: 1.5 % (ref 0–2)
BUN BLD-MCNC: 30 MG/DL (ref 8–23)
BUN/CREAT SERPL: 14.6 (ref 7–25)
CALCIUM SPEC-SCNC: 9.2 MG/DL (ref 8.8–10.5)
CHLORIDE SERPL-SCNC: 101 MMOL/L (ref 98–107)
CO2 SERPL-SCNC: 27.7 MMOL/L (ref 22–29)
CREAT BLD-MCNC: 2.05 MG/DL (ref 0.57–1)
DEPRECATED RDW RBC AUTO: 62.6 FL (ref 37–54)
EOSINOPHIL # BLD AUTO: 0.36 10*3/MM3 (ref 0.1–0.3)
EOSINOPHIL NFR BLD AUTO: 4.5 % (ref 0–4)
ERYTHROCYTE [DISTWIDTH] IN BLOOD BY AUTOMATED COUNT: 17.2 % (ref 11.5–14.5)
GFR SERPL CREATININE-BSD FRML MDRD: 24 ML/MIN/1.73
GLUCOSE BLD-MCNC: 173 MG/DL (ref 65–99)
HCT VFR BLD AUTO: 29.3 % (ref 37–47)
HGB BLD-MCNC: 9.2 G/DL (ref 12–16)
IMM GRANULOCYTES # BLD: 0.05 10*3/MM3 (ref 0–0.03)
IMM GRANULOCYTES NFR BLD: 0.6 % (ref 0–0.5)
LYMPHOCYTES # BLD AUTO: 2.66 10*3/MM3 (ref 0.6–4.8)
LYMPHOCYTES NFR BLD AUTO: 33.2 % (ref 20–45)
MCH RBC QN AUTO: 31.1 PG (ref 27–31)
MCHC RBC AUTO-ENTMCNC: 31.4 G/DL (ref 31–37)
MCV RBC AUTO: 99 FL (ref 81–99)
MONOCYTES # BLD AUTO: 0.36 10*3/MM3 (ref 0–1)
MONOCYTES NFR BLD AUTO: 4.5 % (ref 3–8)
NEUTROPHILS # BLD AUTO: 4.47 10*3/MM3 (ref 1.5–8.3)
NEUTROPHILS NFR BLD AUTO: 55.7 % (ref 45–70)
NRBC BLD MANUAL-RTO: 0 /100 WBC (ref 0–0)
PLATELET # BLD AUTO: 416 10*3/MM3 (ref 140–500)
PMV BLD AUTO: 11.8 FL (ref 7.4–10.4)
POTASSIUM BLD-SCNC: 5.3 MMOL/L (ref 3.5–5.2)
RBC # BLD AUTO: 2.96 10*6/MM3 (ref 4.2–5.4)
SODIUM BLD-SCNC: 139 MMOL/L (ref 136–145)
WBC NRBC COR # BLD: 8.02 10*3/MM3 (ref 4.8–10.8)

## 2017-02-07 PROCEDURE — 80048 BASIC METABOLIC PNL TOTAL CA: CPT | Performed by: INTERNAL MEDICINE

## 2017-02-07 PROCEDURE — 85025 COMPLETE CBC W/AUTO DIFF WBC: CPT | Performed by: INTERNAL MEDICINE

## 2017-02-08 ENCOUNTER — TELEPHONE (OUTPATIENT)
Dept: INTERNAL MEDICINE | Facility: CLINIC | Age: 77
End: 2017-02-08

## 2017-02-08 RX ORDER — FUROSEMIDE 20 MG/1
TABLET ORAL
Qty: 90 TABLET | Refills: 0 | Status: SHIPPED | OUTPATIENT
Start: 2017-02-08 | End: 2017-06-09 | Stop reason: SDUPTHER

## 2017-02-08 NOTE — TELEPHONE ENCOUNTER
OK per Dr. Rea.  ----- Message from Beth Farley MA sent at 2017  2:34 PM EST -----  Regarding: FW: X-RAY CANCELLED      ----- Message -----     From: Ruchi Willis     Sent: 2017  11:21 AM       To: [facility] Clinical Pool  Subject: X-RAY CANCELLED                                  :  4/15[birth year]    PATIENT CALLED TO TELL DR. REA THAT SHE CANCELLED HER X-RAY TODAY DUE TO ILLNESS. SHE WILL HAVE IT DONE BEFORE THE .

## 2017-02-09 ENCOUNTER — TELEPHONE (OUTPATIENT)
Dept: INTERNAL MEDICINE | Facility: CLINIC | Age: 77
End: 2017-02-09

## 2017-02-09 NOTE — TELEPHONE ENCOUNTER
Patient calls this AM, the last time she was here, she talked to Dr. Mercado about getting a temporary wheelchair. She says that Dr. Mercado told her that she could have one but she never got an order for it. She is unable to leave her house and needs the wheelchair so she can get out. I explained that Dr. Mercado is off on Thursdays, I would put the message in and talk to her when she gets back tomorrow and then call her back.

## 2017-02-10 ENCOUNTER — HOSPITAL ENCOUNTER (OUTPATIENT)
Dept: GENERAL RADIOLOGY | Facility: HOSPITAL | Age: 77
Discharge: HOME | End: 2017-02-10
Attending: INTERNAL MEDICINE | Admitting: INTERNAL MEDICINE

## 2017-02-10 ENCOUNTER — HOSPITAL ENCOUNTER (OUTPATIENT)
Dept: GENERAL RADIOLOGY | Facility: HOSPITAL | Age: 77
Discharge: HOME | End: 2017-02-10
Attending: INTERNAL MEDICINE

## 2017-02-10 ENCOUNTER — LAB REQUISITION (OUTPATIENT)
Dept: LAB | Facility: HOSPITAL | Age: 77
End: 2017-02-10

## 2017-02-10 ENCOUNTER — TELEPHONE (OUTPATIENT)
Dept: INTERNAL MEDICINE | Facility: CLINIC | Age: 77
End: 2017-02-10

## 2017-02-10 DIAGNOSIS — M79.605 LEFT LEG PAIN: ICD-10-CM

## 2017-02-10 DIAGNOSIS — N39.0 URINARY TRACT INFECTION: ICD-10-CM

## 2017-02-10 LAB
BACTERIA UR QL AUTO: ABNORMAL /HPF
BILIRUB UR QL STRIP: NEGATIVE
CLARITY UR: ABNORMAL
COLOR UR: YELLOW
GLUCOSE UR STRIP-MCNC: NEGATIVE MG/DL
HGB UR QL STRIP.AUTO: NEGATIVE
HYALINE CASTS UR QL AUTO: ABNORMAL /LPF
KETONES UR QL STRIP: NEGATIVE
LEUKOCYTE ESTERASE UR QL STRIP.AUTO: ABNORMAL
NITRITE UR QL STRIP: NEGATIVE
PH UR STRIP.AUTO: 6 [PH] (ref 4.5–8)
PROT UR QL STRIP: NEGATIVE
RBC # UR: ABNORMAL /HPF
REF LAB TEST METHOD: ABNORMAL
SP GR UR STRIP: 1.01 (ref 1–1.03)
SQUAMOUS #/AREA URNS HPF: ABNORMAL /HPF
UROBILINOGEN UR QL STRIP: ABNORMAL
WBC UR QL AUTO: ABNORMAL /HPF

## 2017-02-10 PROCEDURE — 73590 X-RAY EXAM OF LOWER LEG: CPT

## 2017-02-10 PROCEDURE — 87086 URINE CULTURE/COLONY COUNT: CPT | Performed by: INTERNAL MEDICINE

## 2017-02-10 PROCEDURE — 81001 URINALYSIS AUTO W/SCOPE: CPT | Performed by: INTERNAL MEDICINE

## 2017-02-10 PROCEDURE — 87186 SC STD MICRODIL/AGAR DIL: CPT | Performed by: INTERNAL MEDICINE

## 2017-02-10 PROCEDURE — 73562 X-RAY EXAM OF KNEE 3: CPT

## 2017-02-10 NOTE — TELEPHONE ENCOUNTER
Wheelchair RX ok'd by Dr. Mercado, faxed to [facility] on 2/10/17.  ----- Message from Jessica Murphy sent at 2/9/2017  9:54 AM EST -----  UTI, BUT WAITING ON HOME HEALTH NURSE TO DO TEST.  AFTER THE HOME HEALTH NURSE TO CALL ABOUT UTI.  SHE IS STILL CONCERNED ABOUT A TEMP WHEEL CHAIR.  PLEASE JUST GIVE HER A CALL AFTER NIGHAT TELLS US ABOUT THE UTI.      [phone]

## 2017-02-12 LAB — BACTERIA SPEC AEROBE CULT: ABNORMAL

## 2017-02-13 ENCOUNTER — TELEPHONE (OUTPATIENT)
Dept: INTERNAL MEDICINE | Facility: CLINIC | Age: 77
End: 2017-02-13

## 2017-02-13 DIAGNOSIS — M79.606 PAIN OF LOWER EXTREMITY, UNSPECIFIED LATERALITY: Primary | ICD-10-CM

## 2017-02-13 NOTE — TELEPHONE ENCOUNTER
Patient advised, referral added.     ----- Message from Chari Mercado MD sent at 2/13/2017  8:29 AM EST -----  Call pt about x-rays - neg.  Will refer to ortho since pain persisting.

## 2017-02-14 ENCOUNTER — TELEPHONE (OUTPATIENT)
Dept: INTERNAL MEDICINE | Facility: CLINIC | Age: 77
End: 2017-02-14

## 2017-02-14 RX ORDER — CEPHALEXIN 500 MG/1
500 CAPSULE ORAL 2 TIMES DAILY
Qty: 20 CAPSULE | Refills: 0 | Status: SHIPPED | OUTPATIENT
Start: 2017-02-14 | End: 2017-03-28

## 2017-02-14 NOTE — TELEPHONE ENCOUNTER
----- Message from Beth Farley MA sent at 2/14/2017  5:56 PM EST -----  Received urine culture result from [facility].  Per Dr. Mercado, sent in Rx for Cephalexin 500 mg bid x 10 days #20.  Patient advised.

## 2017-02-15 ENCOUNTER — LAB REQUISITION (OUTPATIENT)
Dept: LAB | Facility: HOSPITAL | Age: 77
End: 2017-02-15

## 2017-02-15 DIAGNOSIS — N39.0 URINARY TRACT INFECTION: ICD-10-CM

## 2017-02-15 LAB
ANION GAP SERPL CALCULATED.3IONS-SCNC: 12.3 MMOL/L
BUN BLD-MCNC: 25 MG/DL (ref 8–23)
BUN/CREAT SERPL: 13.6 (ref 7–25)
CALCIUM SPEC-SCNC: 9.7 MG/DL (ref 8.8–10.5)
CHLORIDE SERPL-SCNC: 96 MMOL/L (ref 98–107)
CO2 SERPL-SCNC: 27.7 MMOL/L (ref 22–29)
CREAT BLD-MCNC: 1.84 MG/DL (ref 0.57–1)
GFR SERPL CREATININE-BSD FRML MDRD: 27 ML/MIN/1.73
GLUCOSE BLD-MCNC: 253 MG/DL (ref 65–99)
POTASSIUM BLD-SCNC: 4.7 MMOL/L (ref 3.5–5.2)
SODIUM BLD-SCNC: 136 MMOL/L (ref 136–145)

## 2017-02-15 PROCEDURE — 80048 BASIC METABOLIC PNL TOTAL CA: CPT | Performed by: INTERNAL MEDICINE

## 2017-02-16 ENCOUNTER — TELEPHONE (OUTPATIENT)
Dept: INTERNAL MEDICINE | Facility: CLINIC | Age: 77
End: 2017-02-16

## 2017-02-16 NOTE — TELEPHONE ENCOUNTER
Patient notified.   ----- Message from Chari Mercado MD sent at 2/15/2017  5:09 PM EST -----  Call pt about labs.  Renal function better.  Encourage hydration and good sugar control

## 2017-02-21 ENCOUNTER — LAB (OUTPATIENT)
Dept: INTERNAL MEDICINE | Facility: CLINIC | Age: 77
End: 2017-02-21

## 2017-02-21 DIAGNOSIS — E11.29 TYPE 2 DIABETES MELLITUS WITH OTHER KIDNEY COMPLICATION: ICD-10-CM

## 2017-02-21 DIAGNOSIS — E03.9 ACQUIRED HYPOTHYROIDISM: ICD-10-CM

## 2017-02-21 DIAGNOSIS — I10 ESSENTIAL HYPERTENSION: Primary | ICD-10-CM

## 2017-02-21 DIAGNOSIS — E78.5 HYPERLIPIDEMIA, UNSPECIFIED HYPERLIPIDEMIA TYPE: ICD-10-CM

## 2017-02-21 DIAGNOSIS — I10 ESSENTIAL HYPERTENSION: ICD-10-CM

## 2017-02-21 DIAGNOSIS — D64.9 CHRONIC ANEMIA: ICD-10-CM

## 2017-02-21 LAB
ALBUMIN SERPL-MCNC: 3.6 G/DL (ref 3.5–5.2)
ALBUMIN/GLOB SERPL: 1.2 G/DL
ALP SERPL-CCNC: 149 U/L (ref 40–129)
ALT SERPL-CCNC: 14 U/L (ref 5–33)
AST SERPL-CCNC: 13 U/L (ref 5–32)
BASOPHILS # BLD AUTO: 0.09 10*3/MM3 (ref 0–0.2)
BASOPHILS NFR BLD AUTO: 1.4 % (ref 0–2)
BILIRUB SERPL-MCNC: 0.3 MG/DL (ref 0.2–1.2)
BUN SERPL-MCNC: 29 MG/DL (ref 8–23)
BUN/CREAT SERPL: 14.9 (ref 7–25)
CALCIUM SERPL-MCNC: 9.2 MG/DL (ref 8.8–10.5)
CHLORIDE SERPL-SCNC: 101 MMOL/L (ref 98–107)
CHOLEST SERPL-MCNC: 86 MG/DL (ref 0–200)
CO2 SERPL-SCNC: 29.4 MMOL/L (ref 22–29)
CREAT SERPL-MCNC: 1.94 MG/DL (ref 0.57–1)
EOSINOPHIL # BLD AUTO: 0.49 10*3/MM3 (ref 0.1–0.3)
EOSINOPHIL NFR BLD AUTO: 7.5 % (ref 0–4)
ERYTHROCYTE [DISTWIDTH] IN BLOOD BY AUTOMATED COUNT: 17.8 % (ref 11.5–14.5)
GLOBULIN SER CALC-MCNC: 3 GM/DL
GLUCOSE SERPL-MCNC: 87 MG/DL (ref 65–99)
HBA1C MFR BLD: 8.1 % (ref 4.8–5.6)
HCT VFR BLD AUTO: 30.2 % (ref 37–47)
HDLC SERPL-MCNC: 34 MG/DL (ref 40–60)
HGB BLD-MCNC: 9.4 G/DL (ref 12–16)
IMM GRANULOCYTES # BLD: 0.05 10*3/MM3 (ref 0–0.03)
IMM GRANULOCYTES NFR BLD: 0.8 % (ref 0–0.5)
LDLC SERPL CALC-MCNC: 33 MG/DL (ref 0–100)
LDLC/HDLC SERPL: 0.98 {RATIO}
LYMPHOCYTES # BLD AUTO: 2.64 10*3/MM3 (ref 0.6–4.8)
LYMPHOCYTES NFR BLD AUTO: 40.3 % (ref 20–45)
MCH RBC QN AUTO: 31 PG (ref 27–31)
MCHC RBC AUTO-ENTMCNC: 31.1 G/DL (ref 31–37)
MCV RBC AUTO: 99.7 FL (ref 81–99)
MONOCYTES # BLD AUTO: 0.33 10*3/MM3 (ref 0–1)
MONOCYTES NFR BLD AUTO: 5 % (ref 3–8)
NEUTROPHILS # BLD AUTO: 2.95 10*3/MM3 (ref 1.5–8.3)
NEUTROPHILS NFR BLD AUTO: 45 % (ref 45–70)
NRBC BLD AUTO-RTO: 0 /100 WBC (ref 0–0)
PLATELET # BLD AUTO: 413 10*3/MM3 (ref 140–500)
POTASSIUM SERPL-SCNC: 4.8 MMOL/L (ref 3.5–5.2)
PROT SERPL-MCNC: 6.6 G/DL (ref 6–8.5)
RBC # BLD AUTO: 3.03 10*6/MM3 (ref 4.2–5.4)
SODIUM SERPL-SCNC: 140 MMOL/L (ref 136–145)
T4 FREE SERPL-MCNC: 1.49 NG/DL (ref 0.93–1.7)
TRIGL SERPL-MCNC: 93 MG/DL (ref 0–150)
TSH SERPL DL<=0.005 MIU/L-ACNC: 5.84 MIU/ML (ref 0.27–4.2)
VIT B12 SERPL-MCNC: 250 PG/ML (ref 211–946)
VLDLC SERPL CALC-MCNC: 18.6 MG/DL (ref 7–27)
WBC # BLD AUTO: 6.55 10*3/MM3 (ref 4.8–10.8)

## 2017-02-28 ENCOUNTER — OFFICE VISIT (OUTPATIENT)
Dept: INTERNAL MEDICINE | Facility: CLINIC | Age: 77
End: 2017-02-28

## 2017-02-28 ENCOUNTER — OFFICE VISIT (OUTPATIENT)
Dept: ORTHOPEDIC SURGERY | Facility: CLINIC | Age: 77
End: 2017-02-28

## 2017-02-28 VITALS
OXYGEN SATURATION: 96 % | HEART RATE: 81 BPM | DIASTOLIC BLOOD PRESSURE: 70 MMHG | HEIGHT: 63 IN | SYSTOLIC BLOOD PRESSURE: 110 MMHG

## 2017-02-28 DIAGNOSIS — I82.409 ACUTE DEEP VEIN THROMBOSIS (DVT) OF OTHER VEIN OF LOWER EXTREMITY: ICD-10-CM

## 2017-02-28 DIAGNOSIS — M25.562 MECHANICAL KNEE PAIN, LEFT: Primary | ICD-10-CM

## 2017-02-28 DIAGNOSIS — M75.82 ROTATOR CUFF TENDONITIS, LEFT: ICD-10-CM

## 2017-02-28 DIAGNOSIS — Z79.01 SUBTHERAPEUTIC ANTICOAGULATION: ICD-10-CM

## 2017-02-28 DIAGNOSIS — M79.605 LEFT LEG PAIN: Primary | ICD-10-CM

## 2017-02-28 DIAGNOSIS — E03.9 ACQUIRED HYPOTHYROIDISM: ICD-10-CM

## 2017-02-28 DIAGNOSIS — N18.30 CKD STAGE 3 DUE TO TYPE 2 DIABETES MELLITUS (HCC): ICD-10-CM

## 2017-02-28 DIAGNOSIS — Z79.4 TYPE 2 DIABETES MELLITUS WITH DIABETIC AUTONOMIC NEUROPATHY, WITH LONG-TERM CURRENT USE OF INSULIN (HCC): ICD-10-CM

## 2017-02-28 DIAGNOSIS — E11.22 CKD STAGE 3 DUE TO TYPE 2 DIABETES MELLITUS (HCC): ICD-10-CM

## 2017-02-28 DIAGNOSIS — Z51.81 SUBTHERAPEUTIC ANTICOAGULATION: ICD-10-CM

## 2017-02-28 DIAGNOSIS — I10 ESSENTIAL HYPERTENSION: ICD-10-CM

## 2017-02-28 DIAGNOSIS — E11.43 TYPE 2 DIABETES MELLITUS WITH DIABETIC AUTONOMIC NEUROPATHY, WITH LONG-TERM CURRENT USE OF INSULIN (HCC): ICD-10-CM

## 2017-02-28 DIAGNOSIS — M19.012 OSTEOARTHROSIS, LOCALIZED, PRIMARY, SHOULDER REGION, LEFT: ICD-10-CM

## 2017-02-28 PROBLEM — M25.569 MECHANICAL KNEE PAIN: Status: ACTIVE | Noted: 2017-02-28

## 2017-02-28 LAB — INR PPP: 1.4 (ref 2–3)

## 2017-02-28 PROCEDURE — 36416 COLLJ CAPILLARY BLOOD SPEC: CPT | Performed by: INTERNAL MEDICINE

## 2017-02-28 PROCEDURE — 85610 PROTHROMBIN TIME: CPT | Performed by: INTERNAL MEDICINE

## 2017-02-28 PROCEDURE — 99214 OFFICE O/P EST MOD 30 MIN: CPT | Performed by: ORTHOPAEDIC SURGERY

## 2017-02-28 PROCEDURE — 99214 OFFICE O/P EST MOD 30 MIN: CPT | Performed by: INTERNAL MEDICINE

## 2017-02-28 PROCEDURE — 20610 DRAIN/INJ JOINT/BURSA W/O US: CPT | Performed by: ORTHOPAEDIC SURGERY

## 2017-02-28 RX ORDER — LEVOTHYROXINE SODIUM 0.07 MG/1
75 TABLET ORAL DAILY
Qty: 90 TABLET | Refills: 1 | Status: SHIPPED | OUTPATIENT
Start: 2017-02-28 | End: 2017-09-29 | Stop reason: SDUPTHER

## 2017-02-28 RX ADMIN — LIDOCAINE HYDROCHLORIDE 4 ML: 10 INJECTION, SOLUTION INFILTRATION; PERINEURAL at 14:31

## 2017-02-28 RX ADMIN — BETAMETHASONE SODIUM PHOSPHATE AND BETAMETHASONE ACETATE 12 MG: 3; 3 INJECTION, SUSPENSION INTRA-ARTICULAR; INTRALESIONAL; INTRAMUSCULAR; SOFT TISSUE at 14:31

## 2017-02-28 NOTE — PROGRESS NOTES
Subjective     Joya Singh is a 76 y.o. female, who presents with a chief complaint of   Chief Complaint   Patient presents with   • Follow-up     Had INR last week, done with Dr. Cartagena   • Foot Pain     L foot pain, X3days Neuropathy?       HPI   The pt is here for follow up.  She is still having pain in the left leg. She has an appt with Dr. Hilliard today.  She says she is also having pain in the bottom of her left foot.  She is in a wheelchair bc she says that her leg hurts too much to walk.  She uses her walker at home most of the time.      She c/o pain in both ears.  No fever    Uncontrolled Diabetes - this am her glucose was 130.  She reports this week her glucoses have been under 170 for the most part.  No hypoglycemia.  She is trying to eat more consistently.      HTN - well controlled. No ha/dizziness.     Hypothyroidism - on synthroid.  No increased fatigue.  No hair/skin changes.     The following portions of the patient's history were reviewed and updated as appropriate: allergies, current medications, past family history, past medical history, past social history, past surgical history and problem list.    Allergies: Morphine and related; Codeine; Morphine; and Penicillins    Review of Systems   Constitutional: Negative.    HENT: Negative.    Eyes: Negative.    Respiratory: Negative.    Cardiovascular: Negative.    Gastrointestinal: Negative.    Endocrine: Negative.    Genitourinary: Negative.    Musculoskeletal: Negative.    Skin: Negative.    Allergic/Immunologic: Negative.    Neurological: Negative.    Hematological: Negative.    Psychiatric/Behavioral: Negative.    All other systems reviewed and are negative.      Objective     Wt Readings from Last 3 Encounters:   01/25/17 207 lb (93.9 kg)   01/24/17 207 lb 6.4 oz (94.1 kg)   12/21/16 209 lb (94.8 kg)     Temp Readings from Last 3 Encounters:   01/25/17 97.8 °F (36.6 °C)   11/08/16 97.8 °F (36.6 °C)   07/19/16 98.6 °F (37 °C)     BP Readings from Last  3 Encounters:   02/28/17 110/70   01/31/17 130/78   01/25/17 120/58     Pulse Readings from Last 3 Encounters:   02/28/17 81   01/31/17 89   01/25/17 79     There is no height or weight on file to calculate BMI.  SpO2 Readings from Last 3 Encounters:   02/28/17 96%   01/31/17 98%   01/25/17 99%       Physical Exam   Constitutional: She is oriented to person, place, and time. She appears well-developed and well-nourished. No distress.   HENT:   Head: Normocephalic and atraumatic.   Right Ear: External ear normal.   Left Ear: External ear normal.   Nose: Nose normal.   Mouth/Throat: Oropharynx is clear and moist.   Eyes: Conjunctivae and EOM are normal. Pupils are equal, round, and reactive to light.   Neck: Normal range of motion. Neck supple.   Cardiovascular: Normal rate, regular rhythm, normal heart sounds and intact distal pulses.    Pulmonary/Chest: Effort normal and breath sounds normal. No respiratory distress. She has no wheezes.   Musculoskeletal:   Pt in wheelchair  TTP of left leg below knee on medial side.    Neurological: She is alert and oriented to person, place, and time.   Skin: Skin is warm and dry.   Psychiatric: She has a normal mood and affect. Her behavior is normal. Judgment and thought content normal.   Nursing note and vitals reviewed.    Left ministerio x-ray with effusion, no bone deformity,   Left tibia x-ray neg    Results for orders placed or performed in visit on 02/28/17   POCT INR   Result Value Ref Range    INR 1.4 (A) 2 - 3       Assessment/Plan   Joya was seen today for follow-up and foot pain.    Diagnoses and all orders for this visit:    Left leg pain - keep f/u with ortho today.  Imaging to date neg.     Acute deep vein thrombosis (DVT) of other vein of lower extremity - INR 1.4  Pt alternating warfarin 5mg and 7.5mg daily.  Pt due for 7.5mg dose tonight.  Pt to take an extra 5mg dose tonight.   Recheck INR 2 weeks.   -     POCT INR    Subtherapeutic anticoagulation - see  above    Essential hypertension - well controlled    Type 2 diabetes mellitus with diabetic autonomic neuropathy, with long-term current use of insulin - uncontrolled.  Sugars overall better.  Working on consistency with patient.      CKD stage 3 due to type 2 diabetes mellitus - stable    Acquired hypothyroidism - tsh elevated more than on previous labs.  Increase synthroid dose from 50mcg -> 75mcg.   -     levothyroxine (SYNTHROID, LEVOTHROID) 75 MCG tablet; Take 1 tablet by mouth Daily.        Outpatient Medications Prior to Visit   Medication Sig Dispense Refill   • ACCU-CHEK FASTCLIX LANCETS misc 1 each 3 (three) times a day. TEST THREE TIMES DAILY TO FOUR TIMES DAILY AS DIRECTED 102 each 11   • acetaminophen (TYLENOL) 500 MG tablet Take by mouth.     • Alcohol Swabs (B-D SINGLE USE SWABS REGULAR) pads      • aspirin 325 MG tablet Take 325 mg by mouth Daily.     • atorvastatin (LIPITOR) 10 MG tablet Take one (1) tablet orally (by mouth) once daily 90 tablet 1   • Blood Glucose Monitoring Suppl (ACCU-CHEK KENNETH SMARTVIEW) W/DEVICE kit USE AS DIRECTED     • buPROPion SR (WELLBUTRIN SR) 150 MG 12 hr tablet Take 1 tablet by mouth Every Night.     • Calcium Carb-Cholecalciferol (CALCIUM PLUS VITAMIN D3 PO) Take by mouth.     • cephalexin (KEFLEX) 500 MG capsule Take 1 capsule by mouth 2 (Two) Times a Day. 20 capsule 0   • cyclobenzaprine (FLEXERIL) 10 MG tablet Take one (1) tablet orally (by mouth) three times daily as needed 90 tablet 0   • diphenhydrAMINE (BENADRYL) 25 mg capsule Take 25 mg by mouth Every 6 (Six) Hours As Needed for itching (take with norco).     • DULoxetine (CYMBALTA) 60 MG capsule Take 1 capsule by mouth Daily. 90 capsule 1   • furosemide (LASIX) 20 MG tablet Take one (1) tablet orally (by mouth) once daily as directed 90 tablet 0   • gabapentin (NEURONTIN) 400 MG capsule Take 1 in am 1 afternoon  and 2 at bedtime 120 capsule 5   • glucose blood (ACCU-CHEK SMARTVIEW) test strip Accu-Chek  "SmartView In Vitro Strip TEST THREE TIMES DAILY  TO FOUR TIMES DAILY AS DIRECTED 300 each 3   • insulin aspart (NOVOLOG FLEXPEN) 100 UNIT/ML solution pen-injector sc pen Inject 20 Units under the skin 3 (Three) Times a Day With Meals. 10 pen 1   • Insulin Degludec (TRESIBA FLEXTOUCH) 200 UNIT/ML solution pen-injector Inject 70 Units under the skin Every Night. 9 mL 6   • LEVEMIR FLEXTOUCH 100 UNIT/ML injection      • lisinopril (PRINIVIL,ZESTRIL) 5 MG tablet Take one (1) tablet orally (by mouth) daily 30 tablet 6   • metoprolol tartrate (LOPRESSOR) 25 MG tablet Take 1/2 tablet orally (by mouth) twice daily 90 tablet 1   • Needle, Disp, (BD DISP NEEDLES) 30G X 1/2\" misc To be used 3 times daily with Novolog Flexpen. 100 each 5   • omeprazole (priLOSEC) 40 MG capsule Take 1 capsule by mouth Daily. 90 capsule 1   • potassium chloride (K-DUR) 10 MEQ CR tablet Take one (1) tablet orally (by mouth) daily 90 tablet 2   • QUEtiapine (SEROquel) 50 MG tablet Take one (1) tablet orally (by mouth) each night at bedtime 30 tablet 6   • traMADol (ULTRAM) 50 MG tablet Take 1 tablet by mouth Every 8 (Eight) Hours As Needed for moderate pain (4-6). 60 tablet 0   • traZODone (DESYREL) 100 MG tablet Take 2 tablets by mouth Every Night.     • ULTICARE MICRO PEN NEEDLES 32G X 4 MM misc      • warfarin (COUMADIN) 5 MG tablet Alternate 1 tablet by mouth one evening and then 1 1/2 tab by mouth the next evening 60 tablet 1   • levothyroxine (SYNTHROID, LEVOTHROID) 50 MCG tablet Take 1 tablet by mouth Daily. 90 tablet 1     No facility-administered medications prior to visit.      New Medications Ordered This Visit   Medications   • levothyroxine (SYNTHROID, LEVOTHROID) 75 MCG tablet     Sig: Take 1 tablet by mouth Daily.     Dispense:  90 tablet     Refill:  1       Medications Discontinued During This Encounter   Medication Reason   • levothyroxine (SYNTHROID, LEVOTHROID) 50 MCG tablet Reorder         Return in about 1 month (around " 3/28/2017) for and INR check in 2 weeks..

## 2017-02-28 NOTE — PATIENT INSTRUCTIONS
Left leg pain - keep f/u with ortho today.  Imaging to date neg.     Acute deep vein thrombosis (DVT) of other vein of lower extremity - INR 1.4  Pt alternating warfarin 5mg and 7.5mg daily.  Pt due for 7.5mg dose tonight.  Pt to take an extra 5mg dose tonight.   Recheck INR 2 weeks.   -     POCT INR    Subtherapeutic anticoagulation - see above    Essential hypertension - well controlled    Type 2 diabetes mellitus with diabetic autonomic neuropathy, with long-term current use of insulin - uncontrolled.  Sugars overall better.  Working on consistency with patient.      CKD stage 3 due to type 2 diabetes mellitus - stable    Acquired hypothyroidism - tsh elevated more than on previous labs.  Increase synthroid dose from 50mcg -> 75mcg.   -     levothyroxine (SYNTHROID, LEVOTHROID) 75 MCG tablet; Take 1 tablet by mouth Daily.

## 2017-03-01 DIAGNOSIS — M79.605 LEFT LEG PAIN: ICD-10-CM

## 2017-03-01 RX ORDER — TRAMADOL HYDROCHLORIDE 50 MG/1
TABLET ORAL
Qty: 60 TABLET | Refills: 0 | OUTPATIENT
Start: 2017-03-01

## 2017-03-03 ENCOUNTER — HOSPITAL ENCOUNTER (OUTPATIENT)
Dept: MRI IMAGING | Facility: HOSPITAL | Age: 77
Discharge: HOME | End: 2017-03-03
Attending: ORTHOPAEDIC SURGERY | Admitting: ORTHOPAEDIC SURGERY

## 2017-03-03 ENCOUNTER — TELEPHONE (OUTPATIENT)
Dept: INTERNAL MEDICINE | Facility: CLINIC | Age: 77
End: 2017-03-03

## 2017-03-03 DIAGNOSIS — M79.605 LEFT LEG PAIN: ICD-10-CM

## 2017-03-03 DIAGNOSIS — M25.562 MECHANICAL KNEE PAIN, LEFT: ICD-10-CM

## 2017-03-03 PROCEDURE — 73721 MRI JNT OF LWR EXTRE W/O DYE: CPT

## 2017-03-03 RX ORDER — TRAMADOL HYDROCHLORIDE 50 MG/1
50 TABLET ORAL EVERY 8 HOURS PRN
Qty: 60 TABLET | Refills: 0 | Status: SHIPPED | OUTPATIENT
Start: 2017-03-03 | End: 2017-03-28

## 2017-03-03 NOTE — TELEPHONE ENCOUNTER
----- Message from Gaviota Bowles MA sent at 3/3/2017 11:52 AM EST -----   LOV  2/28/17  [facility] KENDRICK     ----- Message -----     From: Keren Parmar     Sent: 3/3/2017  11:05 AM       To: [facility] Clinical Pool    Pt states she needs a refill on her pain meds. Pt is not sure what it is. Pt states she was on hydrocodone but it was changed and she needs a refill on it. Doesn't know name, mg, # or last fill date. Can we look into this. Pt states the info should be in her chart. Pt sees tej. Call back is [phone]    Left message for pt that  Tramadol  Called to heather dutta [facility],   Pt called back and is aware

## 2017-03-07 RX ORDER — BETAMETHASONE SODIUM PHOSPHATE AND BETAMETHASONE ACETATE 3; 3 MG/ML; MG/ML
12 INJECTION, SUSPENSION INTRA-ARTICULAR; INTRALESIONAL; INTRAMUSCULAR; SOFT TISSUE
Status: COMPLETED | OUTPATIENT
Start: 2017-02-28 | End: 2017-02-28

## 2017-03-07 RX ORDER — LIDOCAINE HYDROCHLORIDE 10 MG/ML
4 INJECTION, SOLUTION INFILTRATION; PERINEURAL
Status: COMPLETED | OUTPATIENT
Start: 2017-02-28 | End: 2017-02-28

## 2017-03-13 DIAGNOSIS — M54.9 BACK PAIN, CHRONIC: ICD-10-CM

## 2017-03-13 DIAGNOSIS — G89.29 BACK PAIN, CHRONIC: ICD-10-CM

## 2017-03-13 RX ORDER — CYCLOBENZAPRINE HCL 10 MG
TABLET ORAL
Qty: 90 TABLET | Refills: 1 | Status: SHIPPED | OUTPATIENT
Start: 2017-03-13 | End: 2017-06-20

## 2017-03-28 ENCOUNTER — OFFICE VISIT (OUTPATIENT)
Dept: INTERNAL MEDICINE | Facility: CLINIC | Age: 77
End: 2017-03-28

## 2017-03-28 ENCOUNTER — APPOINTMENT (OUTPATIENT)
Dept: SLEEP MEDICINE | Facility: HOSPITAL | Age: 77
End: 2017-03-28

## 2017-03-28 ENCOUNTER — OFFICE VISIT (OUTPATIENT)
Dept: ORTHOPEDIC SURGERY | Facility: CLINIC | Age: 77
End: 2017-03-28

## 2017-03-28 VITALS
SYSTOLIC BLOOD PRESSURE: 130 MMHG | HEART RATE: 69 BPM | HEIGHT: 63 IN | OXYGEN SATURATION: 96 % | DIASTOLIC BLOOD PRESSURE: 82 MMHG

## 2017-03-28 DIAGNOSIS — T45.511D: Primary | ICD-10-CM

## 2017-03-28 DIAGNOSIS — I10 ESSENTIAL HYPERTENSION: ICD-10-CM

## 2017-03-28 DIAGNOSIS — E11.43 TYPE 2 DIABETES MELLITUS WITH DIABETIC AUTONOMIC NEUROPATHY, WITH LONG-TERM CURRENT USE OF INSULIN (HCC): ICD-10-CM

## 2017-03-28 DIAGNOSIS — E78.5 HYPERLIPIDEMIA, UNSPECIFIED HYPERLIPIDEMIA TYPE: ICD-10-CM

## 2017-03-28 DIAGNOSIS — M84.469A INSUFFICIENCY FRACTURE OF TIBIA, INITIAL ENCOUNTER: ICD-10-CM

## 2017-03-28 DIAGNOSIS — I82.409 ACUTE DEEP VEIN THROMBOSIS (DVT) OF OTHER VEIN OF LOWER EXTREMITY: ICD-10-CM

## 2017-03-28 DIAGNOSIS — S83.232A COMPLEX TEAR OF MEDIAL MENISCUS OF LEFT KNEE AS CURRENT INJURY, INITIAL ENCOUNTER: Primary | ICD-10-CM

## 2017-03-28 DIAGNOSIS — Z79.4 TYPE 2 DIABETES MELLITUS WITH DIABETIC AUTONOMIC NEUROPATHY, WITH LONG-TERM CURRENT USE OF INSULIN (HCC): ICD-10-CM

## 2017-03-28 DIAGNOSIS — S83.232A COMPLEX TEAR OF MEDIAL MENISCUS OF LEFT KNEE AS CURRENT INJURY, INITIAL ENCOUNTER: ICD-10-CM

## 2017-03-28 LAB
ALBUMIN SERPL-MCNC: 3.8 G/DL (ref 3.5–5.2)
ALBUMIN/GLOB SERPL: 1.3 G/DL
ALP SERPL-CCNC: 157 U/L (ref 40–129)
ALT SERPL-CCNC: 8 U/L (ref 5–33)
AST SERPL-CCNC: 9 U/L (ref 5–32)
BASOPHILS # BLD AUTO: 0.07 10*3/MM3 (ref 0–0.2)
BASOPHILS NFR BLD AUTO: 1 % (ref 0–2)
BILIRUB SERPL-MCNC: 0.2 MG/DL (ref 0.2–1.2)
BUN SERPL-MCNC: 34 MG/DL (ref 8–23)
BUN/CREAT SERPL: 15.6 (ref 7–25)
CALCIUM SERPL-MCNC: 9.2 MG/DL (ref 8.8–10.5)
CHLORIDE SERPL-SCNC: 104 MMOL/L (ref 98–107)
CO2 SERPL-SCNC: 28.8 MMOL/L (ref 22–29)
CREAT SERPL-MCNC: 2.18 MG/DL (ref 0.57–1)
EOSINOPHIL # BLD AUTO: 0.32 10*3/MM3 (ref 0.1–0.3)
EOSINOPHIL NFR BLD AUTO: 4.4 % (ref 0–4)
ERYTHROCYTE [DISTWIDTH] IN BLOOD BY AUTOMATED COUNT: 17.2 % (ref 11.5–14.5)
GLOBULIN SER CALC-MCNC: 3 GM/DL
GLUCOSE SERPL-MCNC: 99 MG/DL (ref 65–99)
HCT VFR BLD AUTO: 31.7 % (ref 37–47)
HGB BLD-MCNC: 10 G/DL (ref 12–16)
IMM GRANULOCYTES # BLD: 0.04 10*3/MM3 (ref 0–0.03)
IMM GRANULOCYTES NFR BLD: 0.6 % (ref 0–0.5)
INR PPP: 3.7 (ref 2–3)
LYMPHOCYTES # BLD AUTO: 1.61 10*3/MM3 (ref 0.6–4.8)
LYMPHOCYTES NFR BLD AUTO: 22.3 % (ref 20–45)
MCH RBC QN AUTO: 31.5 PG (ref 27–31)
MCHC RBC AUTO-ENTMCNC: 31.5 G/DL (ref 31–37)
MCV RBC AUTO: 100 FL (ref 81–99)
MONOCYTES # BLD AUTO: 0.27 10*3/MM3 (ref 0–1)
MONOCYTES NFR BLD AUTO: 3.7 % (ref 3–8)
NEUTROPHILS # BLD AUTO: 4.92 10*3/MM3 (ref 1.5–8.3)
NEUTROPHILS NFR BLD AUTO: 68 % (ref 45–70)
NRBC BLD AUTO-RTO: 0 /100 WBC (ref 0–0)
PLATELET # BLD AUTO: 331 10*3/MM3 (ref 140–500)
POTASSIUM SERPL-SCNC: 5 MMOL/L (ref 3.5–5.2)
PROT SERPL-MCNC: 6.8 G/DL (ref 6–8.5)
RBC # BLD AUTO: 3.17 10*6/MM3 (ref 4.2–5.4)
SODIUM SERPL-SCNC: 143 MMOL/L (ref 136–145)
WBC # BLD AUTO: 7.23 10*3/MM3 (ref 4.8–10.8)

## 2017-03-28 PROCEDURE — 85610 PROTHROMBIN TIME: CPT | Performed by: INTERNAL MEDICINE

## 2017-03-28 PROCEDURE — 20610 DRAIN/INJ JOINT/BURSA W/O US: CPT | Performed by: ORTHOPAEDIC SURGERY

## 2017-03-28 PROCEDURE — 99215 OFFICE O/P EST HI 40 MIN: CPT | Performed by: INTERNAL MEDICINE

## 2017-03-28 PROCEDURE — 99214 OFFICE O/P EST MOD 30 MIN: CPT | Performed by: ORTHOPAEDIC SURGERY

## 2017-03-28 PROCEDURE — 36416 COLLJ CAPILLARY BLOOD SPEC: CPT | Performed by: INTERNAL MEDICINE

## 2017-03-28 RX ORDER — BUPIVACAINE HYDROCHLORIDE 5 MG/ML
4 INJECTION, SOLUTION EPIDURAL; INTRACAUDAL
Status: COMPLETED | OUTPATIENT
Start: 2017-03-28 | End: 2017-03-28

## 2017-03-28 RX ORDER — BETAMETHASONE SODIUM PHOSPHATE AND BETAMETHASONE ACETATE 3; 3 MG/ML; MG/ML
12 INJECTION, SUSPENSION INTRA-ARTICULAR; INTRALESIONAL; INTRAMUSCULAR; SOFT TISSUE
Status: COMPLETED | OUTPATIENT
Start: 2017-03-28 | End: 2017-03-28

## 2017-03-28 RX ORDER — LIDOCAINE HYDROCHLORIDE 10 MG/ML
4 INJECTION, SOLUTION INFILTRATION; PERINEURAL
Status: COMPLETED | OUTPATIENT
Start: 2017-03-28 | End: 2017-03-28

## 2017-03-28 RX ORDER — HYDROCODONE BITARTRATE AND ACETAMINOPHEN 5; 325 MG/1; MG/1
1 TABLET ORAL EVERY 12 HOURS PRN
Qty: 60 TABLET | Refills: 0 | Status: SHIPPED | OUTPATIENT
Start: 2017-03-28 | End: 2017-05-01 | Stop reason: SDUPTHER

## 2017-03-28 RX ADMIN — LIDOCAINE HYDROCHLORIDE 4 ML: 10 INJECTION, SOLUTION INFILTRATION; PERINEURAL at 08:47

## 2017-03-28 RX ADMIN — BUPIVACAINE HYDROCHLORIDE 4 ML: 5 INJECTION, SOLUTION EPIDURAL; INTRACAUDAL at 08:47

## 2017-03-28 RX ADMIN — BETAMETHASONE SODIUM PHOSPHATE AND BETAMETHASONE ACETATE 12 MG: 3; 3 INJECTION, SUSPENSION INTRA-ARTICULAR; INTRALESIONAL; INTRAMUSCULAR; SOFT TISSUE at 08:47

## 2017-03-28 NOTE — PROGRESS NOTES
Subjective:     Patient ID: Joya Singh is a 76 y.o. female.    Chief Complaint:  Follow-up left knee pain  History of Present Illness  Joya Singh returns to clinic today for evaluation of left knee, stating that she still having significant pain primarily over the medial joint line of her knee, exacerbated with any attempts at weightbearing, pain is now 8 and 9 out of 10, aching in nature, occasional sharp pain noted.  Also notes some radiation of pain down into the dorsum of her foot as well as posterior Thigh and calf.  She has had prior DVT scan since this pain started with no evidence of clot.  Denies any jeffry locking or catching.  Denies any hip or groin pain at this time.  Denies low back pain currently.     Social History     Occupational History   • Not on file.     Social History Main Topics   • Smoking status: Never Smoker   • Smokeless tobacco: Never Used   • Alcohol use No   • Drug use: No   • Sexual activity: Defer      Comment: EXERCISE - RARELY      Past Medical History:   Diagnosis Date   • Allergic rhinitis    • Anxiety    • Arthritis    • Bell's palsy    • Depression    • Diabetes mellitus    • Disease of thyroid gland    • H/O blood clots    • Hyperlipidemia    • Hypertension    • Kidney disease    • AARON (obstructive sleep apnea)    • Stroke    • TIA (transient ischemic attack)      Past Surgical History:   Procedure Laterality Date   • BACK SURGERY     • CHOLECYSTECTOMY     • COLONOSCOPY  2011    due for repeat in 2021   • HYSTERECTOMY     • SPINE SURGERY     • UPPER GASTROINTESTINAL ENDOSCOPY  2014    gastritis.  done by dr. hastings       Family History   Problem Relation Age of Onset   • Lupus Mother    • Heart disease Other    • Hypertension Other          Review of Systems   Constitutional: Negative for chills, diaphoresis, fever and unexpected weight change.   HENT: Negative for hearing loss, nosebleeds, sore throat and tinnitus.    Eyes: Negative for pain and visual disturbance.    Respiratory: Negative for cough, shortness of breath and wheezing.    Cardiovascular: Negative for chest pain and palpitations.   Gastrointestinal: Negative for abdominal pain, diarrhea, nausea and vomiting.   Endocrine: Negative for cold intolerance, heat intolerance and polydipsia.   Genitourinary: Negative for difficulty urinating, dysuria and hematuria.   Musculoskeletal: Negative for arthralgias, joint swelling and myalgias.   Skin: Negative for rash and wound.   Allergic/Immunologic: Negative for environmental allergies.   Neurological: Negative for dizziness, syncope and numbness.   Hematological: Does not bruise/bleed easily.   Psychiatric/Behavioral: Negative for dysphoric mood and sleep disturbance. The patient is not nervous/anxious.    All other systems reviewed and are negative.          Objective:  There were no vitals filed for this visit.  There were no vitals filed for this visit.  There is no height or weight on file to calculate BMI.  General: No acute distress.  Resp: normal respiratory effort  Skin: no rashes or wounds; normal turgor  Psych: mood and affect appropriate; recent and remote memory intact       Ortho Exam    Left knee-active range of motion 5-100°, 4+ out of 5 strength, moderate effusion noted.  Maximal tenderness along medial proximal tibia as well as medial joint line, positive El exam with pain, no click.  Grade 1A Lachman, stable to varus and valgus stress at 5 and 30°.  Negative straight leg raise left lower extremity.  Moderate tenderness on palpation of proximal calf and distal posterior thigh.    Imaging:  Review of outside MRI left knee as well as radiology report indicates complex tear posterior horn medial meniscus including region extending up to the posterior root over a large section, this is not consistent with a meniscal root a avulsion.  No evidence of significant full-thickness chondral loss, low-grade chondromalacia appreciated patellofemoral joint as well  as medial compartment.  No evidence of intra-articular loose body.  There is evidence of subchondral insufficiency edema medial tibial plateau.    Assessment:       1. Complex tear of medial meniscus of left knee as current injury, initial encounter    2. Insufficiency fracture of tibia, initial encounter          Plan:  Large Joint Arthrocentesis  Date/Time: 3/28/2017 8:47 AM  Consent given by: patient  Site marked: site marked  Timeout: Immediately prior to procedure a time out was called to verify the correct patient, procedure, equipment, support staff and site/side marked as required   Supporting Documentation  Indications: pain   Procedure Details  Location: knee - L knee  Preparation: Patient was prepped and draped in the usual sterile fashion  Needle size: 22 G  Approach: superior  Medications administered: 4 mL bupivacaine (PF) 0.5 %; 4 mL lidocaine 1 %; 12 mg betamethasone acetate-betamethasone sodium phosphate 6 (3-3) MG/ML  Patient tolerance: patient tolerated the procedure well with no immediate complications          KENDRICK query complete.          Discussed treatment options at length with patient At this point time I would recommend limiting weightbearing with use of walker, have recommended medial off  brace given intimate fit needed offloading medial compartment as well as evidence of subchondral insufficiency fracture and her obesity.  We did discuss possibility of injection today she was in agreement with this, I did caution her about elevating her blood sugar levels and she'll keep track of this.  Will follow-up in 6 weeks with weightbearing x-rays left knee at that time.    Joya Singh was in agreement with plan and had all questions answered.     Orders:  Orders Placed This Encounter   Procedures   • Large Joint Arthrocentesis   •  Knee Orthosis, Elastic With Joints (Hinged Knee Brace)       Medications:  No orders of the defined types were placed in this  encounter.      Followup:  Return in about 6 weeks (around 5/9/2017).          Dragon transcription disclaimer     Much of this encounter note is an electronic transcription/translation of spoken language to printed text. The electronic translation of spoken language may permit erroneous, or at times, nonsensical words or phrases to be inadvertently transcribed. Although I have reviewed the note for such errors, some may still exist.

## 2017-03-28 NOTE — PROGRESS NOTES
Subjective     Joya Singh is a 76 y.o. female, who presents with a chief complaint of   Chief Complaint   Patient presents with   • Follow-up   • Leg Pain     Saw Dr. Wylie this morning, received steroid injection in L knee. Would like to talk about pain meds for leg pain.       HPI   The pt saw dr. Wylie this am.  She has a tear in her left meniscus.  He gave her a steroid injection.  She is having lots of pain.  She has tramadol but its not helping. If the shot doesn't help they will try bracing.     She is on warfarin and her inr is 3.7 today.  She usually alternates 5mg and 7.5mg dosing.  seh has been eating lots of nuts.   She isnt sure about other diet changes.  No excessive bleeding/bruising    htn - no significant issues lately.  No ha/dizziness    Dm - glucose this am was 89.  She says she is taking her levemir 70 units nightly but was only taking novolog 10 units if glucose higher than 150.      The following portions of the patient's history were reviewed and updated as appropriate: allergies, current medications, past family history, past medical history, past social history, past surgical history and problem list.    Allergies: Morphine and related; Codeine; Morphine; and Penicillins    Review of Systems   Constitutional: Negative.    HENT: Negative.    Eyes: Negative.    Respiratory: Negative.    Cardiovascular: Negative.    Gastrointestinal: Negative.    Endocrine: Negative.    Genitourinary: Negative.    Musculoskeletal:        Left knee/leg pain   Skin: Negative.    Allergic/Immunologic: Negative.    Neurological: Negative.    Hematological: Negative.    Psychiatric/Behavioral: Negative.    All other systems reviewed and are negative.      Objective     Wt Readings from Last 3 Encounters:   01/25/17 207 lb (93.9 kg)   01/24/17 207 lb 6.4 oz (94.1 kg)   12/21/16 209 lb (94.8 kg)     Temp Readings from Last 3 Encounters:   01/25/17 97.8 °F (36.6 °C)   11/08/16 97.8 °F (36.6 °C)   07/19/16 98.6 °F (37  °C)     BP Readings from Last 3 Encounters:   03/28/17 130/82   02/28/17 110/70   01/31/17 130/78     Pulse Readings from Last 3 Encounters:   03/28/17 69   02/28/17 81   01/31/17 89     There is no height or weight on file to calculate BMI.  SpO2 Readings from Last 3 Encounters:   03/28/17 96%   02/28/17 96%   01/31/17 98%       Physical Exam   Constitutional: She is oriented to person, place, and time. She appears well-developed and well-nourished. No distress.   HENT:   Head: Normocephalic and atraumatic.   Right Ear: External ear normal.   Left Ear: External ear normal.   Nose: Nose normal.   Mouth/Throat: Oropharynx is clear and moist.   Eyes: Conjunctivae and EOM are normal. Pupils are equal, round, and reactive to light.   Neck: Normal range of motion. Neck supple.   Cardiovascular: Normal rate, regular rhythm, normal heart sounds and intact distal pulses.    Pulmonary/Chest: Effort normal and breath sounds normal. No respiratory distress. She has no wheezes.   Musculoskeletal:   Pt in wheelchair   Neurological: She is alert and oriented to person, place, and time.   Skin: Skin is warm and dry.   Psychiatric: She has a normal mood and affect. Her behavior is normal. Judgment and thought content normal.   Nursing note and vitals reviewed.      Results for orders placed or performed in visit on 03/28/17   POCT INR   Result Value Ref Range    INR 3.7 (A) 2 - 3       Assessment/Plan   Joya was seen today for follow-up and leg pain.    Diagnoses and all orders for this visit:    Essential hypertension  -     Comprehensive metabolic panel; Future  -     CBC w AUTO Differential; Future  -     POCT INR  -     Comprehensive metabolic panel  -     CBC w AUTO Differential    Acute deep vein thrombosis (DVT) of other vein of lower extremity  -     Comprehensive metabolic panel; Future  -     CBC w AUTO Differential; Future  -     POCT INR  -     Comprehensive metabolic panel  -     CBC w AUTO  Differential    Hyperlipidemia, unspecified hyperlipidemia type  -     Comprehensive metabolic panel; Future  -     CBC w AUTO Differential; Future  -     POCT INR  -     Comprehensive metabolic panel  -     CBC w AUTO Differential    Type 2 diabetes mellitus with diabetic autonomic neuropathy, with long-term current use of insulin - reviewed management plan with pt today    Meniscus tear - norco prn pain initiated today.  Discussed RBA with pt.  She has tolerated norco in past.     Warfarin toxicity, accidental or unintentional, subsequent encounter - Hold warfarin today.  Decrease warfarin dose to 5mg daily and recheck 1 week.    Outpatient Medications Prior to Visit   Medication Sig Dispense Refill   • ACCU-CHEK FASTCLIX LANCETS misc 1 each 3 (three) times a day. TEST THREE TIMES DAILY TO FOUR TIMES DAILY AS DIRECTED 102 each 11   • acetaminophen (TYLENOL) 500 MG tablet Take by mouth.     • Alcohol Swabs (B-D SINGLE USE SWABS REGULAR) pads      • aspirin 325 MG tablet Take 325 mg by mouth Daily.     • atorvastatin (LIPITOR) 10 MG tablet Take one (1) tablet orally (by mouth) once daily 90 tablet 1   • Blood Glucose Monitoring Suppl (ACCU-CHEK KENNETH SMARTVIEW) W/DEVICE kit USE AS DIRECTED     • buPROPion SR (WELLBUTRIN SR) 150 MG 12 hr tablet Take 1 tablet by mouth Every Night.     • Calcium Carb-Cholecalciferol (CALCIUM PLUS VITAMIN D3 PO) Take by mouth.     • cyclobenzaprine (FLEXERIL) 10 MG tablet Take one (1) tablet orally (by mouth) three times daily as needed 90 tablet 1   • diphenhydrAMINE (BENADRYL) 25 mg capsule Take 25 mg by mouth Every 6 (Six) Hours As Needed for itching (take with norco).     • DULoxetine (CYMBALTA) 60 MG capsule Take 1 capsule by mouth Daily. 90 capsule 1   • furosemide (LASIX) 20 MG tablet Take one (1) tablet orally (by mouth) once daily as directed 90 tablet 0   • gabapentin (NEURONTIN) 400 MG capsule Take 1 in am 1 afternoon  and 2 at bedtime 120 capsule 5   • glucose blood  "(ACCU-CHEK SMARTVIEW) test strip Accu-Chek SmartView In Vitro Strip TEST THREE TIMES DAILY  TO FOUR TIMES DAILY AS DIRECTED 300 each 3   • insulin aspart (NOVOLOG FLEXPEN) 100 UNIT/ML solution pen-injector sc pen Inject 20 Units under the skin 3 (Three) Times a Day With Meals. 10 pen 1   • LEVEMIR FLEXTOUCH 100 UNIT/ML injection      • levothyroxine (SYNTHROID, LEVOTHROID) 75 MCG tablet Take 1 tablet by mouth Daily. 90 tablet 1   • lisinopril (PRINIVIL,ZESTRIL) 5 MG tablet Take one (1) tablet orally (by mouth) daily 30 tablet 6   • metoprolol tartrate (LOPRESSOR) 25 MG tablet Take 1/2 tablet orally (by mouth) twice daily 90 tablet 1   • Needle, Disp, (BD DISP NEEDLES) 30G X 1/2\" misc To be used 3 times daily with Novolog Flexpen. 100 each 5   • omeprazole (priLOSEC) 40 MG capsule Take 1 capsule by mouth Daily. 90 capsule 1   • potassium chloride (K-DUR) 10 MEQ CR tablet Take one (1) tablet orally (by mouth) daily 90 tablet 2   • QUEtiapine (SEROquel) 50 MG tablet Take one (1) tablet orally (by mouth) each night at bedtime 30 tablet 6   • traZODone (DESYREL) 100 MG tablet Take 2 tablets by mouth Every Night.     • ULTICARE MICRO PEN NEEDLES 32G X 4 MM misc      • warfarin (COUMADIN) 5 MG tablet Alternate 1 tablet by mouth one evening and then 1 1/2 tab by mouth the next evening 60 tablet 1   • cephalexin (KEFLEX) 500 MG capsule Take 1 capsule by mouth 2 (Two) Times a Day. 20 capsule 0   • Insulin Degludec (TRESIBA FLEXTOUCH) 200 UNIT/ML solution pen-injector Inject 70 Units under the skin Every Night. 9 mL 6   • traMADol (ULTRAM) 50 MG tablet Take 1 tablet by mouth Every 8 (Eight) Hours As Needed for moderate pain (4-6). 60 tablet 0     No facility-administered medications prior to visit.      New Medications Ordered This Visit   Medications   • HYDROcodone-acetaminophen (NORCO) 5-325 MG per tablet     Sig: Take 1 tablet by mouth Every 12 (Twelve) Hours As Needed for Moderate Pain (4-6).     Dispense:  60 tablet     " Refill:  0     Medications Discontinued During This Encounter   Medication Reason   • cephalexin (KEFLEX) 500 MG capsule    • traMADol (ULTRAM) 50 MG tablet    • Insulin Degludec (TRESIBA FLEXTOUCH) 200 UNIT/ML solution pen-injector          Return in about 1 month (around 4/28/2017) for Recheck, labs.

## 2017-04-19 ENCOUNTER — RESULTS ENCOUNTER (OUTPATIENT)
Dept: INTERNAL MEDICINE | Facility: CLINIC | Age: 77
End: 2017-04-19

## 2017-04-19 DIAGNOSIS — E11.29 TYPE 2 DIABETES MELLITUS WITH OTHER KIDNEY COMPLICATION: ICD-10-CM

## 2017-04-19 DIAGNOSIS — I82.409 ACUTE DEEP VEIN THROMBOSIS (DVT) OF OTHER VEIN OF LOWER EXTREMITY: ICD-10-CM

## 2017-04-19 DIAGNOSIS — E78.5 HYPERLIPIDEMIA, UNSPECIFIED HYPERLIPIDEMIA TYPE: ICD-10-CM

## 2017-04-19 DIAGNOSIS — I10 ESSENTIAL HYPERTENSION: ICD-10-CM

## 2017-04-19 DIAGNOSIS — I10 ESSENTIAL HYPERTENSION: Primary | ICD-10-CM

## 2017-04-24 ENCOUNTER — RESULTS ENCOUNTER (OUTPATIENT)
Dept: INTERNAL MEDICINE | Facility: CLINIC | Age: 77
End: 2017-04-24

## 2017-04-24 DIAGNOSIS — E11.29 TYPE 2 DIABETES MELLITUS WITH OTHER KIDNEY COMPLICATION: ICD-10-CM

## 2017-04-24 DIAGNOSIS — E78.5 HYPERLIPIDEMIA, UNSPECIFIED HYPERLIPIDEMIA TYPE: ICD-10-CM

## 2017-04-24 DIAGNOSIS — I10 ESSENTIAL HYPERTENSION: ICD-10-CM

## 2017-04-24 DIAGNOSIS — I82.409 ACUTE DEEP VEIN THROMBOSIS (DVT) OF OTHER VEIN OF LOWER EXTREMITY: ICD-10-CM

## 2017-04-26 ENCOUNTER — OFFICE VISIT (OUTPATIENT)
Dept: INTERNAL MEDICINE | Facility: CLINIC | Age: 77
End: 2017-04-26

## 2017-04-26 ENCOUNTER — ANTICOAGULATION VISIT (OUTPATIENT)
Dept: INTERNAL MEDICINE | Facility: CLINIC | Age: 77
End: 2017-04-26

## 2017-04-26 VITALS
HEART RATE: 93 BPM | HEIGHT: 63 IN | OXYGEN SATURATION: 93 % | SYSTOLIC BLOOD PRESSURE: 128 MMHG | DIASTOLIC BLOOD PRESSURE: 80 MMHG

## 2017-04-26 DIAGNOSIS — T45.511D: ICD-10-CM

## 2017-04-26 DIAGNOSIS — G89.29 CHRONIC BACK PAIN, UNSPECIFIED BACK LOCATION, UNSPECIFIED BACK PAIN LATERALITY: ICD-10-CM

## 2017-04-26 DIAGNOSIS — F33.1 MODERATE EPISODE OF RECURRENT MAJOR DEPRESSIVE DISORDER (HCC): ICD-10-CM

## 2017-04-26 DIAGNOSIS — I48.91 ATRIAL FIBRILLATION, UNSPECIFIED TYPE (HCC): Primary | ICD-10-CM

## 2017-04-26 DIAGNOSIS — E03.9 ACQUIRED HYPOTHYROIDISM: ICD-10-CM

## 2017-04-26 DIAGNOSIS — N18.30 CKD STAGE 3 DUE TO TYPE 2 DIABETES MELLITUS (HCC): ICD-10-CM

## 2017-04-26 DIAGNOSIS — E11.22 CKD STAGE 3 DUE TO TYPE 2 DIABETES MELLITUS (HCC): ICD-10-CM

## 2017-04-26 DIAGNOSIS — E11.43 TYPE 2 DIABETES MELLITUS WITH DIABETIC AUTONOMIC NEUROPATHY, WITH LONG-TERM CURRENT USE OF INSULIN (HCC): ICD-10-CM

## 2017-04-26 DIAGNOSIS — I82.4Y9 DEEP VEIN THROMBOSIS (DVT) OF PROXIMAL LOWER EXTREMITY, UNSPECIFIED CHRONICITY, UNSPECIFIED LATERALITY (HCC): ICD-10-CM

## 2017-04-26 DIAGNOSIS — G47.00 INSOMNIA WITH SLEEP APNEA: ICD-10-CM

## 2017-04-26 DIAGNOSIS — Z79.01 WARFARIN ANTICOAGULATION: ICD-10-CM

## 2017-04-26 DIAGNOSIS — I10 ESSENTIAL HYPERTENSION: Primary | ICD-10-CM

## 2017-04-26 DIAGNOSIS — M54.9 CHRONIC BACK PAIN, UNSPECIFIED BACK LOCATION, UNSPECIFIED BACK PAIN LATERALITY: ICD-10-CM

## 2017-04-26 DIAGNOSIS — Z79.4 TYPE 2 DIABETES MELLITUS WITH DIABETIC AUTONOMIC NEUROPATHY, WITH LONG-TERM CURRENT USE OF INSULIN (HCC): ICD-10-CM

## 2017-04-26 DIAGNOSIS — G47.30 INSOMNIA WITH SLEEP APNEA: ICD-10-CM

## 2017-04-26 LAB
ALBUMIN SERPL-MCNC: 3.6 G/DL (ref 3.5–5.2)
ALBUMIN/GLOB SERPL: 1.3 G/DL
ALP SERPL-CCNC: 158 U/L (ref 40–129)
ALT SERPL-CCNC: 12 U/L (ref 5–33)
AST SERPL-CCNC: 11 U/L (ref 5–32)
BASOPHILS # BLD AUTO: 0.08 10*3/MM3 (ref 0–0.2)
BASOPHILS NFR BLD AUTO: 1.2 % (ref 0–2)
BILIRUB SERPL-MCNC: 0.3 MG/DL (ref 0.2–1.2)
BUN SERPL-MCNC: 24 MG/DL (ref 8–23)
BUN/CREAT SERPL: 13.6 (ref 7–25)
CALCIUM SERPL-MCNC: 9 MG/DL (ref 8.8–10.5)
CHLORIDE SERPL-SCNC: 99 MMOL/L (ref 98–107)
CO2 SERPL-SCNC: 27.7 MMOL/L (ref 22–29)
CREAT SERPL-MCNC: 1.77 MG/DL (ref 0.57–1)
EOSINOPHIL # BLD AUTO: 0.25 10*3/MM3 (ref 0.1–0.3)
EOSINOPHIL NFR BLD AUTO: 3.6 % (ref 0–4)
ERYTHROCYTE [DISTWIDTH] IN BLOOD BY AUTOMATED COUNT: 16.4 % (ref 11.5–14.5)
EXPIRATION DATE: NORMAL
GLOBULIN SER CALC-MCNC: 2.8 GM/DL
GLUCOSE SERPL-MCNC: 340 MG/DL (ref 65–99)
HBA1C MFR BLD: 9.3 %
HCT VFR BLD AUTO: 31.5 % (ref 37–47)
HGB BLD-MCNC: 10 G/DL (ref 12–16)
IMM GRANULOCYTES # BLD: 0.04 10*3/MM3 (ref 0–0.03)
IMM GRANULOCYTES NFR BLD: 0.6 % (ref 0–0.5)
INR PPP: 3.8 (ref 2–3)
INR PPP: 3.8 (ref 2–3)
LYMPHOCYTES # BLD AUTO: 2.38 10*3/MM3 (ref 0.6–4.8)
LYMPHOCYTES NFR BLD AUTO: 34.4 % (ref 20–45)
Lab: NORMAL
MCH RBC QN AUTO: 31 PG (ref 27–31)
MCHC RBC AUTO-ENTMCNC: 31.7 G/DL (ref 31–37)
MCV RBC AUTO: 97.5 FL (ref 81–99)
MONOCYTES # BLD AUTO: 0.29 10*3/MM3 (ref 0–1)
MONOCYTES NFR BLD AUTO: 4.2 % (ref 3–8)
NEUTROPHILS # BLD AUTO: 3.88 10*3/MM3 (ref 1.5–8.3)
NEUTROPHILS NFR BLD AUTO: 56 % (ref 45–70)
NRBC BLD AUTO-RTO: 0 /100 WBC (ref 0–0)
PLATELET # BLD AUTO: 327 10*3/MM3 (ref 140–500)
POTASSIUM SERPL-SCNC: 5.7 MMOL/L (ref 3.5–5.2)
PROT SERPL-MCNC: 6.4 G/DL (ref 6–8.5)
RBC # BLD AUTO: 3.23 10*6/MM3 (ref 4.2–5.4)
SODIUM SERPL-SCNC: 137 MMOL/L (ref 136–145)
T4 FREE SERPL-MCNC: 1.4 NG/DL (ref 0.93–1.7)
TSH SERPL DL<=0.005 MIU/L-ACNC: 3.66 MIU/ML (ref 0.27–4.2)
WBC # BLD AUTO: 6.92 10*3/MM3 (ref 4.8–10.8)

## 2017-04-26 PROCEDURE — 85610 PROTHROMBIN TIME: CPT | Performed by: INTERNAL MEDICINE

## 2017-04-26 PROCEDURE — 36416 COLLJ CAPILLARY BLOOD SPEC: CPT | Performed by: INTERNAL MEDICINE

## 2017-04-26 PROCEDURE — 83036 HEMOGLOBIN GLYCOSYLATED A1C: CPT | Performed by: INTERNAL MEDICINE

## 2017-04-26 PROCEDURE — 99214 OFFICE O/P EST MOD 30 MIN: CPT | Performed by: INTERNAL MEDICINE

## 2017-04-26 RX ORDER — INSULIN DEGLUDEC 200 U/ML
80 INJECTION, SOLUTION SUBCUTANEOUS NIGHTLY
COMMUNITY
Start: 2017-04-19 | End: 2018-05-10 | Stop reason: SDUPTHER

## 2017-04-27 PROBLEM — I82.409 DVT OF LOWER EXTREMITY (DEEP VENOUS THROMBOSIS) (HCC): Status: ACTIVE | Noted: 2017-04-27

## 2017-05-01 ENCOUNTER — TELEPHONE (OUTPATIENT)
Dept: INTERNAL MEDICINE | Facility: CLINIC | Age: 77
End: 2017-05-01

## 2017-05-01 DIAGNOSIS — S83.232A COMPLEX TEAR OF MEDIAL MENISCUS OF LEFT KNEE AS CURRENT INJURY, INITIAL ENCOUNTER: ICD-10-CM

## 2017-05-01 RX ORDER — HYDROCODONE BITARTRATE AND ACETAMINOPHEN 5; 325 MG/1; MG/1
1 TABLET ORAL EVERY 12 HOURS PRN
Qty: 60 TABLET | Refills: 0 | Status: SHIPPED | OUTPATIENT
Start: 2017-05-01 | End: 2017-06-12 | Stop reason: SDUPTHER

## 2017-05-02 ENCOUNTER — TELEPHONE (OUTPATIENT)
Dept: INTERNAL MEDICINE | Facility: CLINIC | Age: 77
End: 2017-05-02

## 2017-05-03 RX ORDER — LISINOPRIL 5 MG/1
TABLET ORAL
Qty: 90 TABLET | Refills: 3 | Status: SHIPPED | OUTPATIENT
Start: 2017-05-03 | End: 2017-10-09 | Stop reason: SDUPTHER

## 2017-05-09 ENCOUNTER — ANTICOAGULATION VISIT (OUTPATIENT)
Dept: INTERNAL MEDICINE | Facility: CLINIC | Age: 77
End: 2017-05-09

## 2017-05-09 ENCOUNTER — OFFICE VISIT (OUTPATIENT)
Dept: ORTHOPEDIC SURGERY | Facility: CLINIC | Age: 77
End: 2017-05-09

## 2017-05-09 DIAGNOSIS — S83.232D COMPLEX TEAR OF MEDIAL MENISCUS OF LEFT KNEE AS CURRENT INJURY, SUBSEQUENT ENCOUNTER: ICD-10-CM

## 2017-05-09 DIAGNOSIS — R82.90 ABNORMAL URINE FINDINGS: Primary | ICD-10-CM

## 2017-05-09 DIAGNOSIS — I82.4Y9 DEEP VEIN THROMBOSIS (DVT) OF PROXIMAL LOWER EXTREMITY, UNSPECIFIED CHRONICITY, UNSPECIFIED LATERALITY (HCC): ICD-10-CM

## 2017-05-09 DIAGNOSIS — R52 PAIN: Primary | ICD-10-CM

## 2017-05-09 DIAGNOSIS — M17.12 PRIMARY OSTEOARTHRITIS OF LEFT KNEE: ICD-10-CM

## 2017-05-09 LAB — INR PPP: 1.4 (ref 2–3)

## 2017-05-09 PROCEDURE — 85610 PROTHROMBIN TIME: CPT | Performed by: INTERNAL MEDICINE

## 2017-05-09 PROCEDURE — 73562 X-RAY EXAM OF KNEE 3: CPT | Performed by: ORTHOPAEDIC SURGERY

## 2017-05-09 PROCEDURE — 99213 OFFICE O/P EST LOW 20 MIN: CPT | Performed by: ORTHOPAEDIC SURGERY

## 2017-05-09 PROCEDURE — 36416 COLLJ CAPILLARY BLOOD SPEC: CPT | Performed by: INTERNAL MEDICINE

## 2017-05-12 ENCOUNTER — CLINICAL SUPPORT (OUTPATIENT)
Dept: ORTHOPEDIC SURGERY | Facility: CLINIC | Age: 77
End: 2017-05-12

## 2017-05-12 DIAGNOSIS — R52 PAIN: Primary | ICD-10-CM

## 2017-05-12 DIAGNOSIS — M17.12 PRIMARY OSTEOARTHRITIS OF LEFT KNEE: ICD-10-CM

## 2017-05-12 LAB
BACTERIA UR CULT: ABNORMAL
BACTERIA UR CULT: ABNORMAL
OTHER ANTIBIOTIC SUSC ISLT: ABNORMAL

## 2017-05-12 PROCEDURE — 20610 DRAIN/INJ JOINT/BURSA W/O US: CPT | Performed by: NURSE PRACTITIONER

## 2017-05-12 RX ORDER — HYALURONATE SODIUM 10 MG/ML
20 SYRINGE (ML) INTRAARTICULAR ONCE
Status: COMPLETED | OUTPATIENT
Start: 2017-05-12 | End: 2017-05-12

## 2017-05-12 RX ADMIN — Medication 20 MG: at 10:18

## 2017-05-15 RX ORDER — CEFUROXIME AXETIL 250 MG/1
250 TABLET ORAL 2 TIMES DAILY
Qty: 14 TABLET | Refills: 0 | Status: SHIPPED | OUTPATIENT
Start: 2017-05-15 | End: 2017-06-20

## 2017-05-16 ENCOUNTER — TELEPHONE (OUTPATIENT)
Dept: INTERNAL MEDICINE | Facility: CLINIC | Age: 77
End: 2017-05-16

## 2017-05-19 ENCOUNTER — CLINICAL SUPPORT (OUTPATIENT)
Dept: ORTHOPEDIC SURGERY | Facility: CLINIC | Age: 77
End: 2017-05-19

## 2017-05-19 DIAGNOSIS — R52 PAIN: Primary | ICD-10-CM

## 2017-05-19 DIAGNOSIS — M17.12 PRIMARY OSTEOARTHRITIS OF LEFT KNEE: ICD-10-CM

## 2017-05-19 PROCEDURE — 20610 DRAIN/INJ JOINT/BURSA W/O US: CPT | Performed by: NURSE PRACTITIONER

## 2017-05-19 RX ORDER — HYALURONATE SODIUM 10 MG/ML
20 SYRINGE (ML) INTRAARTICULAR ONCE
Status: COMPLETED | OUTPATIENT
Start: 2017-05-19 | End: 2017-05-19

## 2017-05-19 RX ADMIN — Medication 20 MG: at 10:19

## 2017-05-26 ENCOUNTER — OFFICE VISIT (OUTPATIENT)
Dept: INTERNAL MEDICINE | Facility: CLINIC | Age: 77
End: 2017-05-26

## 2017-05-26 ENCOUNTER — CLINICAL SUPPORT (OUTPATIENT)
Dept: ORTHOPEDIC SURGERY | Facility: CLINIC | Age: 77
End: 2017-05-26

## 2017-05-26 VITALS
OXYGEN SATURATION: 95 % | DIASTOLIC BLOOD PRESSURE: 82 MMHG | SYSTOLIC BLOOD PRESSURE: 124 MMHG | HEART RATE: 81 BPM | HEIGHT: 63 IN

## 2017-05-26 DIAGNOSIS — Z79.01 WARFARIN ANTICOAGULATION: ICD-10-CM

## 2017-05-26 DIAGNOSIS — Z79.4 TYPE 2 DIABETES MELLITUS WITH DIABETIC AUTONOMIC NEUROPATHY, WITH LONG-TERM CURRENT USE OF INSULIN (HCC): Primary | ICD-10-CM

## 2017-05-26 DIAGNOSIS — Z51.81 SUBTHERAPEUTIC ANTICOAGULATION: ICD-10-CM

## 2017-05-26 DIAGNOSIS — M17.12 PRIMARY OSTEOARTHRITIS OF LEFT KNEE: ICD-10-CM

## 2017-05-26 DIAGNOSIS — E11.43 TYPE 2 DIABETES MELLITUS WITH DIABETIC AUTONOMIC NEUROPATHY, WITH LONG-TERM CURRENT USE OF INSULIN (HCC): Primary | ICD-10-CM

## 2017-05-26 DIAGNOSIS — R52 PAIN: Primary | ICD-10-CM

## 2017-05-26 DIAGNOSIS — Z79.01 SUBTHERAPEUTIC ANTICOAGULATION: ICD-10-CM

## 2017-05-26 DIAGNOSIS — E11.22 CKD STAGE 3 DUE TO TYPE 2 DIABETES MELLITUS (HCC): ICD-10-CM

## 2017-05-26 DIAGNOSIS — N18.30 CKD STAGE 3 DUE TO TYPE 2 DIABETES MELLITUS (HCC): ICD-10-CM

## 2017-05-26 LAB
ALBUMIN SERPL-MCNC: 3.8 G/DL (ref 3.5–5.2)
ALBUMIN/GLOB SERPL: 1.4 G/DL
ALP SERPL-CCNC: 136 U/L (ref 40–129)
ALT SERPL-CCNC: 8 U/L (ref 5–33)
AST SERPL-CCNC: 11 U/L (ref 5–32)
BASOPHILS # BLD AUTO: 0.07 10*3/MM3 (ref 0–0.2)
BASOPHILS NFR BLD AUTO: 1.2 % (ref 0–2)
BILIRUB SERPL-MCNC: 0.5 MG/DL (ref 0.2–1.2)
BUN SERPL-MCNC: 23 MG/DL (ref 8–23)
BUN/CREAT SERPL: 15 (ref 7–25)
CALCIUM SERPL-MCNC: 9.2 MG/DL (ref 8.8–10.5)
CHLORIDE SERPL-SCNC: 105 MMOL/L (ref 98–107)
CO2 SERPL-SCNC: 26.4 MMOL/L (ref 22–29)
CREAT SERPL-MCNC: 1.53 MG/DL (ref 0.57–1)
EOSINOPHIL # BLD AUTO: 0.26 10*3/MM3 (ref 0.1–0.3)
EOSINOPHIL NFR BLD AUTO: 4.6 % (ref 0–4)
ERYTHROCYTE [DISTWIDTH] IN BLOOD BY AUTOMATED COUNT: 16 % (ref 11.5–14.5)
GLOBULIN SER CALC-MCNC: 2.8 GM/DL
GLUCOSE SERPL-MCNC: 133 MG/DL (ref 65–99)
HBA1C MFR BLD: 8.3 % (ref 4.8–5.6)
HCT VFR BLD AUTO: 31.2 % (ref 37–47)
HGB BLD-MCNC: 10 G/DL (ref 12–16)
IMM GRANULOCYTES # BLD: 0.04 10*3/MM3 (ref 0–0.03)
IMM GRANULOCYTES NFR BLD: 0.7 % (ref 0–0.5)
INR PPP: 1.5 (ref 2–3)
LYMPHOCYTES # BLD AUTO: 1.94 10*3/MM3 (ref 0.6–4.8)
LYMPHOCYTES NFR BLD AUTO: 34.5 % (ref 20–45)
MCH RBC QN AUTO: 31 PG (ref 27–31)
MCHC RBC AUTO-ENTMCNC: 32.1 G/DL (ref 31–37)
MCV RBC AUTO: 96.6 FL (ref 81–99)
MONOCYTES # BLD AUTO: 0.22 10*3/MM3 (ref 0–1)
MONOCYTES NFR BLD AUTO: 3.9 % (ref 3–8)
NEUTROPHILS # BLD AUTO: 3.09 10*3/MM3 (ref 1.5–8.3)
NEUTROPHILS NFR BLD AUTO: 55.1 % (ref 45–70)
NRBC BLD AUTO-RTO: 0 /100 WBC (ref 0–0)
PLATELET # BLD AUTO: 312 10*3/MM3 (ref 140–500)
POTASSIUM SERPL-SCNC: 4.7 MMOL/L (ref 3.5–5.2)
PROT SERPL-MCNC: 6.6 G/DL (ref 6–8.5)
RBC # BLD AUTO: 3.23 10*6/MM3 (ref 4.2–5.4)
SODIUM SERPL-SCNC: 143 MMOL/L (ref 136–145)
WBC # BLD AUTO: 5.62 10*3/MM3 (ref 4.8–10.8)

## 2017-05-26 PROCEDURE — 85610 PROTHROMBIN TIME: CPT | Performed by: INTERNAL MEDICINE

## 2017-05-26 PROCEDURE — 36416 COLLJ CAPILLARY BLOOD SPEC: CPT | Performed by: INTERNAL MEDICINE

## 2017-05-26 PROCEDURE — 99214 OFFICE O/P EST MOD 30 MIN: CPT | Performed by: INTERNAL MEDICINE

## 2017-05-26 PROCEDURE — 20610 DRAIN/INJ JOINT/BURSA W/O US: CPT | Performed by: NURSE PRACTITIONER

## 2017-05-26 RX ORDER — HYALURONATE SODIUM 10 MG/ML
20 SYRINGE (ML) INTRAARTICULAR ONCE
Status: COMPLETED | OUTPATIENT
Start: 2017-05-26 | End: 2017-05-26

## 2017-05-26 RX ADMIN — Medication 20 MG: at 11:24

## 2017-05-31 ENCOUNTER — TELEPHONE (OUTPATIENT)
Dept: INTERNAL MEDICINE | Facility: CLINIC | Age: 77
End: 2017-05-31

## 2017-06-01 DIAGNOSIS — G47.33 OSA (OBSTRUCTIVE SLEEP APNEA): ICD-10-CM

## 2017-06-01 DIAGNOSIS — I10 HYPERTENSION, ESSENTIAL: ICD-10-CM

## 2017-06-01 DIAGNOSIS — Z79.01 ENCOUNTER FOR MONITORING COUMADIN THERAPY: ICD-10-CM

## 2017-06-01 DIAGNOSIS — Z51.81 ENCOUNTER FOR MONITORING COUMADIN THERAPY: ICD-10-CM

## 2017-06-01 DIAGNOSIS — E03.9 HYPOTHYROIDISM, UNSPECIFIED TYPE: ICD-10-CM

## 2017-06-01 DIAGNOSIS — N18.9 CKD (CHRONIC KIDNEY DISEASE), UNSPECIFIED STAGE: ICD-10-CM

## 2017-06-01 DIAGNOSIS — E11.69 TYPE 2 DIABETES MELLITUS WITH OTHER SPECIFIED COMPLICATION (HCC): ICD-10-CM

## 2017-06-01 DIAGNOSIS — E78.2 HYPERLIPIDEMIA, MIXED: ICD-10-CM

## 2017-06-01 DIAGNOSIS — I82.419 DEEP VEIN THROMBOSIS (DVT) OF FEMORAL VEIN, UNSPECIFIED CHRONICITY, UNSPECIFIED LATERALITY (HCC): Primary | ICD-10-CM

## 2017-06-02 ENCOUNTER — ANTICOAGULATION VISIT (OUTPATIENT)
Dept: INTERNAL MEDICINE | Facility: CLINIC | Age: 77
End: 2017-06-02

## 2017-06-02 LAB — INR PPP: 2.8 (ref 2–3)

## 2017-06-02 PROCEDURE — 36416 COLLJ CAPILLARY BLOOD SPEC: CPT | Performed by: INTERNAL MEDICINE

## 2017-06-02 PROCEDURE — 85610 PROTHROMBIN TIME: CPT | Performed by: INTERNAL MEDICINE

## 2017-06-12 DIAGNOSIS — S83.232A COMPLEX TEAR OF MEDIAL MENISCUS OF LEFT KNEE AS CURRENT INJURY, INITIAL ENCOUNTER: ICD-10-CM

## 2017-06-12 RX ORDER — FUROSEMIDE 20 MG/1
TABLET ORAL
Qty: 90 TABLET | Refills: 1 | Status: SHIPPED | OUTPATIENT
Start: 2017-06-12 | End: 2017-10-09

## 2017-06-12 RX ORDER — HYDROCODONE BITARTRATE AND ACETAMINOPHEN 5; 325 MG/1; MG/1
1 TABLET ORAL EVERY 12 HOURS PRN
Qty: 60 TABLET | Refills: 0 | Status: SHIPPED | OUTPATIENT
Start: 2017-06-12 | End: 2017-07-17 | Stop reason: SDUPTHER

## 2017-06-13 ENCOUNTER — TELEPHONE (OUTPATIENT)
Dept: INTERNAL MEDICINE | Facility: CLINIC | Age: 77
End: 2017-06-13

## 2017-06-13 NOTE — TELEPHONE ENCOUNTER
----- Message from REY Elizabeth sent at 2017  9:28 AM EDT -----  Regarding: RE: REFILL  Contact: [phone]  OKay for 1 refill  ----- Message -----     From: Gaviota Bowles MA     Sent: 2017   8:52 AM       To: REY Elizabeth  Subject: FW: REFILL                                        Dr casey pt   lov   17  [facility]  Needs narc, will be done   ----- Message -----     From: Ruchi Willis     Sent: 2017   8:45 AM       To: Gaviota Bowles MA  Subject: REFILL                                           :  [birth year]  [facility] PATIENT    PATIENT NEEDS A REFILL ON HYDROCODONE 5-325 MG TABLET. TAKES 2 DAILY.      Pt  Aware to  script and read and sign narc agreement

## 2017-06-20 ENCOUNTER — OFFICE VISIT (OUTPATIENT)
Dept: INTERNAL MEDICINE | Facility: CLINIC | Age: 77
End: 2017-06-20

## 2017-06-20 VITALS
HEIGHT: 63 IN | SYSTOLIC BLOOD PRESSURE: 126 MMHG | DIASTOLIC BLOOD PRESSURE: 80 MMHG | WEIGHT: 214.2 LBS | BODY MASS INDEX: 37.95 KG/M2 | TEMPERATURE: 97.6 F | HEART RATE: 94 BPM | OXYGEN SATURATION: 98 %

## 2017-06-20 DIAGNOSIS — L72.3 SEBACEOUS CYST: Primary | ICD-10-CM

## 2017-06-20 PROCEDURE — 99214 OFFICE O/P EST MOD 30 MIN: CPT | Performed by: INTERNAL MEDICINE

## 2017-06-20 RX ORDER — CEFDINIR 300 MG/1
300 CAPSULE ORAL 2 TIMES DAILY
Qty: 14 CAPSULE | Refills: 0 | Status: SHIPPED | OUTPATIENT
Start: 2017-06-20 | End: 2017-06-27

## 2017-06-20 NOTE — PROGRESS NOTES
"Subjective     Joya Singh is a 77 y.o. female, who presents with a chief complaint of   Chief Complaint   Patient presents with   • Abscess     Pt states boil like spots on back, painful, 2x weeks, pt confirms leaking    • Anxiety     pt states \"my voice is shaking because I am in so much pain.\" pt states itching all over body, x questions if related to rx or wrap        HPI Comments: 76 yo F with CKD, HTN, DVT on coumadin, and diabetes who presents with wound that has not gotten better. Normally seen by Dr. Mercado. This is a new problem to me.  Patient has had a knot on her back for 6 weeks in her back near the spine. Started oozing one week ago. She has been bandaging the area and using triple antibiotic ointment that has not helped.  The discharge is green. No fever or chills. No history of skin infections. Back surgery about 14 years ago, but nothing in the last few years. The area is painful. She was in a lot of pain yesterday, but it is now better. She has been taking Hydrocodone which has helped.        The following portions of the patient's history were reviewed and updated as appropriate: allergies, current medications, past family history, past medical history, past social history, past surgical history and problem list.    Allergies: Morphine and related; Codeine; Morphine; and Penicillins    Current Outpatient Prescriptions:   •  ACCU-CHEK FASTCLIX LANCETS misc, 1 each 3 (three) times a day. TEST THREE TIMES DAILY TO FOUR TIMES DAILY AS DIRECTED, Disp: 102 each, Rfl: 11  •  acetaminophen (TYLENOL) 500 MG tablet, Take by mouth., Disp: , Rfl:   •  Alcohol Swabs (B-D SINGLE USE SWABS REGULAR) pads, , Disp: , Rfl:   •  aspirin 325 MG tablet, Take 325 mg by mouth Daily., Disp: , Rfl:   •  atorvastatin (LIPITOR) 10 MG tablet, Take one (1) tablet orally (by mouth) once daily, Disp: 90 tablet, Rfl: 1  •  Blood Glucose Monitoring Suppl (ACCU-CHEK KENNETH SMARTVIEW) W/DEVICE kit, USE AS DIRECTED, Disp: , Rfl:   •  " "buPROPion SR (WELLBUTRIN SR) 150 MG 12 hr tablet, Take 1 tablet by mouth Every Night., Disp: , Rfl:   •  Calcium Carb-Cholecalciferol (CALCIUM PLUS VITAMIN D3 PO), Take by mouth., Disp: , Rfl:   •  DULoxetine (CYMBALTA) 60 MG capsule, Take 1 capsule by mouth Daily., Disp: 90 capsule, Rfl: 1  •  furosemide (LASIX) 20 MG tablet, Take one (1) tablet orally (by mouth) once daily, Disp: 90 tablet, Rfl: 1  •  gabapentin (NEURONTIN) 400 MG capsule, Take 1 in am 1 afternoon  and 2 at bedtime, Disp: 120 capsule, Rfl: 5  •  glucose blood (ACCU-CHEK SMARTVIEW) test strip, Accu-Chek SmartView In Vitro Strip TEST THREE TIMES DAILY  TO FOUR TIMES DAILY AS DIRECTED, Disp: 300 each, Rfl: 3  •  HYDROcodone-acetaminophen (NORCO) 5-325 MG per tablet, Take 1 tablet by mouth Every 12 (Twelve) Hours As Needed for Moderate Pain (4-6)., Disp: 60 tablet, Rfl: 0  •  insulin aspart (NOVOLOG FLEXPEN) 100 UNIT/ML solution pen-injector sc pen, Inject 20 Units under the skin 3 (Three) Times a Day With Meals., Disp: 10 pen, Rfl: 1  •  levothyroxine (SYNTHROID, LEVOTHROID) 75 MCG tablet, Take 1 tablet by mouth Daily., Disp: 90 tablet, Rfl: 1  •  lisinopril (PRINIVIL,ZESTRIL) 5 MG tablet, Take one (1) tablet orally (by mouth) once daily, Disp: 90 tablet, Rfl: 3  •  metoprolol tartrate (LOPRESSOR) 25 MG tablet, Take 1/2 tablet orally (by mouth) twice daily, Disp: 90 tablet, Rfl: 1  •  Needle, Disp, (BD DISP NEEDLES) 30G X 1/2\" misc, To be used 3 times daily with Novolog Flexpen., Disp: 100 each, Rfl: 5  •  omeprazole (priLOSEC) 40 MG capsule, Take 1 capsule by mouth Daily., Disp: 90 capsule, Rfl: 1  •  potassium chloride (K-DUR) 10 MEQ CR tablet, Take one (1) tablet orally (by mouth) daily, Disp: 90 tablet, Rfl: 2  •  TRESIBA FLEXTOUCH 200 UNIT/ML solution pen-injector, , Disp: , Rfl:   •  ULTICARE MICRO PEN NEEDLES 32G X 4 MM misc, , Disp: , Rfl:   •  ULTICARE PEN NEEDLES 29G X 12MM misc, , Disp: , Rfl:   •  warfarin (COUMADIN) 5 MG tablet, " "Alternate 1 tablet by mouth one evening and then 1 1/2 tab by mouth the next evening, Disp: 60 tablet, Rfl: 1  •  cefdinir (OMNICEF) 300 MG capsule, Take 1 capsule by mouth 2 (Two) Times a Day for 7 days., Disp: 14 capsule, Rfl: 0  Medications Discontinued During This Encounter   Medication Reason   • cyclobenzaprine (FLEXERIL) 10 MG tablet    • diphenhydrAMINE (BENADRYL) 25 mg capsule    • cefuroxime (CEFTIN) 250 MG tablet        Review of Systems   Constitutional: Negative for chills and fever.   HENT: Negative for congestion and rhinorrhea.    Respiratory: Negative for cough and shortness of breath.    Cardiovascular: Negative for chest pain and palpitations.   Musculoskeletal: Negative for back pain.   Skin: Positive for color change and wound.       Objective     /80 (BP Location: Left arm, Patient Position: Sitting, Cuff Size: Adult)  Pulse 94  Temp 97.6 °F (36.4 °C) (Oral)   Ht 62.5\" (158.8 cm)  Wt 214 lb 3.2 oz (97.2 kg)  SpO2 98%  BMI 38.55 kg/m2      Physical Exam   Constitutional: She is oriented to person, place, and time. She appears well-developed and well-nourished. No distress.   HENT:   Head: Normocephalic and atraumatic.   Right Ear: External ear normal.   Left Ear: External ear normal.   Mouth/Throat: Oropharynx is clear and moist. No oropharyngeal exudate.   Eyes: Conjunctivae are normal. Right eye exhibits no discharge. Left eye exhibits no discharge. No scleral icterus.   Neck: Neck supple.   Lymphadenopathy:     She has no cervical adenopathy.   Neurological: She is alert and oriented to person, place, and time.   Skin: Skin is warm. Lesion (Quarter size draining lesion that appears to be a sebaceous cyst over the thoracic spine. Midline scar that is well healed. ) noted. No rash noted.   Psychiatric: She has a normal mood and affect. Her behavior is normal.   Nursing note and vitals reviewed.        Results for orders placed or performed in visit on 06/02/17   POC INR   Result " Value Ref Range    INR 2.80 2 - 3       Assessment/Plan   Joya was seen today for abscess and anxiety.    Diagnoses and all orders for this visit:    Sebaceous cyst  -     MRI thoracic spine w contrast; Future    Other orders  -     cefdinir (OMNICEF) 300 MG capsule; Take 1 capsule by mouth 2 (Two) Times a Day for 7 days.      Will treat with antibiotics to help clear infection and obtain MRI of her back given that the draining lesion is midline to her spine to rule out fistula or connection. I am concerned due to the area that it is located that it may be communicating. Due to the uncertainty in her diagnosis, I think that it is best to schedule MRI after antibiotics pending her kidney function. Patient knows to call if there is fever, chills, increase in swelling, severe back pain.     Return for Next scheduled follow up.    Keren Garcia MD  06/20/2017

## 2017-07-03 RX ORDER — ATORVASTATIN CALCIUM 10 MG/1
TABLET, FILM COATED ORAL
Qty: 90 TABLET | Refills: 3 | Status: SHIPPED | OUTPATIENT
Start: 2017-07-03 | End: 2017-10-09 | Stop reason: SDUPTHER

## 2017-07-05 ENCOUNTER — HOSPITAL ENCOUNTER (OUTPATIENT)
Dept: MRI IMAGING | Facility: HOSPITAL | Age: 77
Discharge: HOME | End: 2017-07-05
Attending: INTERNAL MEDICINE | Admitting: INTERNAL MEDICINE

## 2017-07-05 DIAGNOSIS — L72.3 SEBACEOUS CYST: ICD-10-CM

## 2017-07-05 PROCEDURE — 0 GADOBENATE DIMEGLUMINE 529 MG/ML SOLUTION: Performed by: INTERNAL MEDICINE

## 2017-07-05 PROCEDURE — A9577 INJ MULTIHANCE: HCPCS | Performed by: INTERNAL MEDICINE

## 2017-07-05 PROCEDURE — 72157 MRI CHEST SPINE W/O & W/DYE: CPT

## 2017-07-05 RX ORDER — CYCLOBENZAPRINE HCL 10 MG
TABLET ORAL
Qty: 90 TABLET | Refills: 1 | Status: SHIPPED | OUTPATIENT
Start: 2017-07-05 | End: 2017-08-02 | Stop reason: SDUPTHER

## 2017-07-05 RX ADMIN — GADOBENATE DIMEGLUMINE 10 ML: 529 INJECTION, SOLUTION INTRAVENOUS at 11:37

## 2017-07-07 ENCOUNTER — TELEPHONE (OUTPATIENT)
Dept: INTERNAL MEDICINE | Facility: CLINIC | Age: 77
End: 2017-07-07

## 2017-07-07 ENCOUNTER — OFFICE VISIT (OUTPATIENT)
Dept: INTERNAL MEDICINE | Facility: CLINIC | Age: 77
End: 2017-07-07

## 2017-07-07 ENCOUNTER — HOSPITAL ENCOUNTER (OUTPATIENT)
Dept: GENERAL RADIOLOGY | Facility: HOSPITAL | Age: 77
Discharge: HOME | End: 2017-07-07
Attending: INTERNAL MEDICINE | Admitting: INTERNAL MEDICINE

## 2017-07-07 ENCOUNTER — OFFICE VISIT (OUTPATIENT)
Dept: ORTHOPEDIC SURGERY | Facility: CLINIC | Age: 77
End: 2017-07-07

## 2017-07-07 VITALS
SYSTOLIC BLOOD PRESSURE: 124 MMHG | HEIGHT: 63 IN | OXYGEN SATURATION: 97 % | TEMPERATURE: 98 F | HEART RATE: 67 BPM | DIASTOLIC BLOOD PRESSURE: 82 MMHG

## 2017-07-07 DIAGNOSIS — R52 PAIN: ICD-10-CM

## 2017-07-07 DIAGNOSIS — L72.3 SEBACEOUS CYST: ICD-10-CM

## 2017-07-07 DIAGNOSIS — M84.469A INSUFFICIENCY FRACTURE OF TIBIA, INITIAL ENCOUNTER: ICD-10-CM

## 2017-07-07 DIAGNOSIS — R05.3 CHRONIC COUGH: Primary | ICD-10-CM

## 2017-07-07 DIAGNOSIS — K22.3 ESOPHAGEAL PERFORATION: ICD-10-CM

## 2017-07-07 DIAGNOSIS — M19.012 OSTEOARTHROSIS, LOCALIZED, PRIMARY, SHOULDER REGION, LEFT: Primary | ICD-10-CM

## 2017-07-07 DIAGNOSIS — M17.12 PRIMARY OSTEOARTHRITIS OF LEFT KNEE: ICD-10-CM

## 2017-07-07 DIAGNOSIS — S83.232D COMPLEX TEAR OF MEDIAL MENISCUS OF LEFT KNEE AS CURRENT INJURY, SUBSEQUENT ENCOUNTER: ICD-10-CM

## 2017-07-07 DIAGNOSIS — K22.9 ESOPHAGEAL ABNORMALITY: ICD-10-CM

## 2017-07-07 DIAGNOSIS — R23.0 PERIORAL CYANOSIS: ICD-10-CM

## 2017-07-07 PROCEDURE — 99214 OFFICE O/P EST MOD 30 MIN: CPT | Performed by: ORTHOPAEDIC SURGERY

## 2017-07-07 PROCEDURE — 20610 DRAIN/INJ JOINT/BURSA W/O US: CPT | Performed by: ORTHOPAEDIC SURGERY

## 2017-07-07 PROCEDURE — 73030 X-RAY EXAM OF SHOULDER: CPT | Performed by: ORTHOPAEDIC SURGERY

## 2017-07-07 PROCEDURE — 71020 HC CHEST PA AND LATERAL: CPT

## 2017-07-07 PROCEDURE — 99214 OFFICE O/P EST MOD 30 MIN: CPT | Performed by: INTERNAL MEDICINE

## 2017-07-07 RX ADMIN — LIDOCAINE HYDROCHLORIDE 4 ML: 10 INJECTION, SOLUTION EPIDURAL; INFILTRATION; INTRACAUDAL; PERINEURAL at 10:43

## 2017-07-07 RX ADMIN — BETAMETHASONE SODIUM PHOSPHATE AND BETAMETHASONE ACETATE 12 MG: 3; 3 INJECTION, SUSPENSION INTRA-ARTICULAR; INTRALESIONAL; INTRAMUSCULAR; SOFT TISSUE at 10:43

## 2017-07-07 NOTE — PROGRESS NOTES
Please call patient with these results and let her know that her CXR was normal. We will set her up for swallow study to evaluate the whole in her esophagus.

## 2017-07-07 NOTE — TELEPHONE ENCOUNTER
Patient scheduled for later this morning.     ----- Message from Keren Garcia MD sent at 2017  1:48 PM EDT -----  Regarding: RE: HOME VISIT  Contact: [phone]  We need to see her in clinic for evaluation. Please schedule.     ----- Message -----     From: Beth Farley MA     Sent: 2017   1:05 PM       To: Isabell Medeiros MA  Subject: FW: HOME VISIT                                   I notice that Dr. Garcia has seen Joya in the past.  Spoke with patient.  She states that she has a constant, dry cough with no production.  Occasionally SOA, but blue skin tone was a few weeks ago.  That has resolved.  She feels this may be allergies, but she has no other allergy symptoms.  Please run this past Dr. KACI Oleary.  ----- Message -----     From: Beth Farley MA     Sent: 2017  11:15 AM       To: Beth Farley MA  Subject: FW: HOME VISIT                                   ----- Message -----     From: Ruchi Willis     Sent: 2017  11:08 AM       To: [facility] Clinical [facility]  Subject: HOME VISIT                                       : [birth year]  [facility] PATIENT    FABBY FROM [facility] HOME VISIT CALLED CONCERNED ABOUT PATIENT. SHE THINKS SOMEONE FROM THIS OFFICE SHOULD CALL PATIENT AND CHECK ON HER. SHE HAS BEEN EXPERIENCING COUGHING THAT IS SO BAD SHE IS TURNING BLUE. I EXPLAINED THAT DR. REA'S MA IS NOT HERE TODAY AND COULD CALL TOMORROW. SHE FELT THAT THE PATIENT SHOULD BE CALLED TODAY.    ANY QUESTIONS, USHALUCI CAN BE REACHED [phone].

## 2017-07-07 NOTE — PROGRESS NOTES
"Subjective     Joya Singh is a 77 y.o. female, who presents with a chief complaint of   Chief Complaint   Patient presents with   • Cough     f/u see telephone encounter, pt \"blue\" when coughing (pt states happened weeks ago) but cough still present    • Fall     pt fell this AM on L side, Dr. Wylie XR earlier today at apt    • Follow-up     MRI results        HPI Comments: 76 yo F with diabetes and a \"hole in her esophagus\". She has had coughing episodes for several months. No fever or chills. No CP and cough is dry. No issues with her lungs before and is not a smoker.  No environmental allergies. She has really bad acid reflux that she takes medication for. The coughing fits come on whenever they want to and cause her fits. She turned blue several months ago when she go strangled, but that has all resolved. She dose have some post nasal drip in her throat occasionally.     She fell this morning over a dog cable and fell on left side. She just tripped and had x-rays of legs that were normal.     I have reviewed MRI with patient and there is no fistula and lesion is healing.        The following portions of the patient's history were reviewed and updated as appropriate: allergies, current medications, past family history, past medical history, past social history, past surgical history and problem list.    Allergies: Morphine and related; Codeine; Morphine; and Penicillins    Current Outpatient Prescriptions:   •  ACCU-CHEK FASTCLIX LANCETS misc, 1 each 3 (three) times a day. TEST THREE TIMES DAILY TO FOUR TIMES DAILY AS DIRECTED, Disp: 102 each, Rfl: 11  •  acetaminophen (TYLENOL) 500 MG tablet, Take by mouth., Disp: , Rfl:   •  Alcohol Swabs (B-D SINGLE USE SWABS REGULAR) pads, , Disp: , Rfl:   •  aspirin 325 MG tablet, Take 325 mg by mouth Daily., Disp: , Rfl:   •  atorvastatin (LIPITOR) 10 MG tablet, Take one (1) tablet orally (by mouth) once daily, Disp: 90 tablet, Rfl: 3  •  Blood Glucose Monitoring Suppl " "(ACCU-CHEK KENNETH SMARTVIEW) W/DEVICE kit, USE AS DIRECTED, Disp: , Rfl:   •  buPROPion SR (WELLBUTRIN SR) 150 MG 12 hr tablet, Take 1 tablet by mouth Every Night., Disp: , Rfl:   •  Calcium Carb-Cholecalciferol (CALCIUM PLUS VITAMIN D3 PO), Take by mouth., Disp: , Rfl:   •  cyclobenzaprine (FLEXERIL) 10 MG tablet, Take one (1) tablet orally (by mouth) three times daily as needed, Disp: 90 tablet, Rfl: 1  •  DULoxetine (CYMBALTA) 60 MG capsule, Take 1 capsule by mouth Daily., Disp: 90 capsule, Rfl: 1  •  furosemide (LASIX) 20 MG tablet, Take one (1) tablet orally (by mouth) once daily, Disp: 90 tablet, Rfl: 1  •  gabapentin (NEURONTIN) 400 MG capsule, Take 1 in am 1 afternoon  and 2 at bedtime, Disp: 120 capsule, Rfl: 5  •  glucose blood (ACCU-CHEK SMARTVIEW) test strip, Accu-Chek SmartView In Vitro Strip TEST THREE TIMES DAILY  TO FOUR TIMES DAILY AS DIRECTED, Disp: 300 each, Rfl: 3  •  HYDROcodone-acetaminophen (NORCO) 5-325 MG per tablet, Take 1 tablet by mouth Every 12 (Twelve) Hours As Needed for Moderate Pain (4-6)., Disp: 60 tablet, Rfl: 0  •  insulin aspart (NOVOLOG FLEXPEN) 100 UNIT/ML solution pen-injector sc pen, Inject 20 Units under the skin 3 (Three) Times a Day With Meals., Disp: 10 pen, Rfl: 1  •  levothyroxine (SYNTHROID, LEVOTHROID) 75 MCG tablet, Take 1 tablet by mouth Daily., Disp: 90 tablet, Rfl: 1  •  lisinopril (PRINIVIL,ZESTRIL) 5 MG tablet, Take one (1) tablet orally (by mouth) once daily, Disp: 90 tablet, Rfl: 3  •  metoprolol tartrate (LOPRESSOR) 25 MG tablet, Take 1/2 tablet orally (by mouth) twice daily, Disp: 90 tablet, Rfl: 1  •  Needle, Disp, (BD DISP NEEDLES) 30G X 1/2\" misc, To be used 3 times daily with Novolog Flexpen., Disp: 100 each, Rfl: 5  •  omeprazole (priLOSEC) 40 MG capsule, Take 1 capsule by mouth Daily., Disp: 90 capsule, Rfl: 1  •  potassium chloride (K-DUR) 10 MEQ CR tablet, Take one (1) tablet orally (by mouth) daily, Disp: 90 tablet, Rfl: 2  •  TRESIBA FLEXTOUCH 200 " "UNIT/ML solution pen-injector, , Disp: , Rfl:   •  ULTICARE MICRO PEN NEEDLES 32G X 4 MM misc, , Disp: , Rfl:   •  ULTICARE PEN NEEDLES 29G X 12MM misc, , Disp: , Rfl:   •  warfarin (COUMADIN) 5 MG tablet, Alternate 1 tablet by mouth one evening and then 1 1/2 tab by mouth the next evening, Disp: 60 tablet, Rfl: 1  There are no discontinued medications.    Review of Systems   Constitutional: Negative for chills and fever.   Respiratory: Positive for cough. Negative for shortness of breath.    Cardiovascular: Negative for chest pain and palpitations.   Gastrointestinal: Negative for abdominal pain, diarrhea, nausea and vomiting.   Skin: Negative for rash.       Objective     /82 (BP Location: Left arm, Patient Position: Sitting, Cuff Size: Adult)  Pulse 67  Temp 98 °F (36.7 °C) (Oral)   Ht 62.5\" (158.8 cm)  SpO2 97%      Physical Exam   Constitutional: She is oriented to person, place, and time. She appears well-developed and well-nourished. No distress.   HENT:   Head: Normocephalic and atraumatic.   Right Ear: External ear normal.   Left Ear: External ear normal.   Mouth/Throat: Oropharynx is clear and moist. No oropharyngeal exudate.   Eyes: Conjunctivae are normal. Right eye exhibits no discharge. Left eye exhibits no discharge. No scleral icterus.   Cardiovascular: Normal rate, regular rhythm and normal heart sounds.  Exam reveals no gallop and no friction rub.    No murmur heard.  Pulmonary/Chest: Effort normal. No respiratory distress. She has decreased breath sounds in the right lower field and the left lower field. She has no wheezes. She has no rales.   Neurological: She is alert and oriented to person, place, and time.   Skin: Skin is warm. Lesion (Sebaceous cyst on back is better and healed, but still appears to be healing. No redness or discharge.) noted. No rash noted.   Psychiatric: She has a normal mood and affect. Her behavior is normal.   Nursing note and vitals reviewed.        Results " "for orders placed or performed in visit on 06/02/17   POC INR   Result Value Ref Range    INR 2.80 2 - 3       Assessment/Plan   Joya was seen today for cough, fall and follow-up.    Diagnoses and all orders for this visit:    Chronic cough  -     XR Chest PA & Lateral    Sebaceous cyst  -     Ambulatory Referral to General Surgery    Perioral cyanosis      Will order CXR to evaluate for her chronic cough and blue episodes today. Will call with results and start Zyrtec as she does seem to have some PND causing this.     I think that she also need swallow study to evaluate her \"hole in her esophagus\" as there is no records in the chart.     If blue spells return, may consider pertussis workup as well as echo.    Whelan send to surgery to excise cyst over back         Return if symptoms worsen or fail to improve.    Keren Garcia MD  07/07/2017    "

## 2017-07-07 NOTE — TELEPHONE ENCOUNTER
----- Message from Keren Garcia MD sent at 7/7/2017 12:58 PM EDT -----  Please call patient with these results and let her know that her CXR was normal. We will set her up for swallow study to evaluate the whole in her esophagus.    Pt was advised of xray results. [place] and would like to get swallow study done the first of August. daniel

## 2017-07-07 NOTE — PROGRESS NOTES
Discussed results with family in clinic today. Will refer to surgery if they can excise without sedation and have our office arrange.

## 2017-07-07 NOTE — PROGRESS NOTES
Subjective:     Patient ID: Joya Singh is a 77 y.o. female.    Chief Complaint:  Follow-up left knee pain, medial meniscus tear, tibia insufficiency fracture  Follow-up left shoulder pain, glenohumeral arthritis  History of Present Illness  Joya Singh returns to clinic today for evaluation of left knee, states she's had mild improvement with viscous supplement injection series which she just recently completed, feels like her knee is moving slightly better but still has moderate pain over the medial proximal tibia that is causing her some issues.  She does have off  brace as well, has had some issues with that fitting appropriately and we will get her brace adjustment her.  Notes residual pain to the left knee as a 6 out of 10, aching in nature, occasional sharp pain noted, exacerbated with standing and weightbearing, mild improvement with rest.    Patient has noted increasing issues with her left shoulder over the last 4 weeks, primarily noted is increasing pain along anterior posterior joint lines, rates as 8 out of 10 in aching in nature, moderate associated crepitus appreciated.  Minimal improvement with rest.  Has also noted associated increasing stiffness and inability to lift, push, pull with her left arm.  She states that she has had a few falls recently where she had a support herself with her left shoulder feels like this may have exacerbated her pain.     Social History     Occupational History   • Not on file.     Social History Main Topics   • Smoking status: Never Smoker   • Smokeless tobacco: Never Used   • Alcohol use No   • Drug use: No   • Sexual activity: Defer      Comment: EXERCISE - RARELY      Past Medical History:   Diagnosis Date   • Allergic rhinitis    • Anxiety    • Arthritis    • Bell's palsy    • Depression    • Diabetes mellitus    • Disease of thyroid gland    • H/O blood clots    • Hyperlipidemia    • Hypertension    • Kidney disease    • AARON (obstructive sleep apnea)    • Stroke     • TIA (transient ischemic attack)      Past Surgical History:   Procedure Laterality Date   • BACK SURGERY     • CHOLECYSTECTOMY     • COLONOSCOPY  2011    due for repeat in 2021   • HYSTERECTOMY     • SPINE SURGERY     • UPPER GASTROINTESTINAL ENDOSCOPY  2014    gastritis.  done by dr. hastings       Family History   Problem Relation Age of Onset   • Lupus Mother    • Heart disease Other    • Hypertension Other          Review of Systems   Constitutional: Negative for chills, diaphoresis, fever and unexpected weight change.   HENT: Negative for hearing loss, nosebleeds, sore throat and tinnitus.    Eyes: Negative for pain and visual disturbance.   Respiratory: Negative for cough, shortness of breath and wheezing.    Cardiovascular: Negative for chest pain and palpitations.   Gastrointestinal: Negative for abdominal pain, diarrhea, nausea and vomiting.   Endocrine: Negative for cold intolerance, heat intolerance and polydipsia.   Genitourinary: Negative for difficulty urinating, dysuria and hematuria.   Musculoskeletal: Positive for arthralgias. Negative for joint swelling and myalgias.   Skin: Negative for rash and wound.   Allergic/Immunologic: Negative for environmental allergies.   Neurological: Negative for dizziness, syncope and numbness.   Hematological: Does not bruise/bleed easily.   Psychiatric/Behavioral: Negative for dysphoric mood and sleep disturbance. The patient is not nervous/anxious.    All other systems reviewed and are negative.          Objective:  There were no vitals filed for this visit.  There were no vitals filed for this visit.  There is no height or weight on file to calculate BMI.  General: No acute distress.  Resp: normal respiratory effort  Skin: no rashes or wounds; normal turgor  Psych: mood and affect appropriate; recent and remote memory intact         Ortho Exam    Left knee-active range of motion 5-120°, 4 out of 5 strength, maximal tenderness palpation along medial proximal  tibia, mild tenderness along posterior medial joint line.  Mild effusion noted.  Stable to varus and valgus stress at 5 and 30°.    Left shoulder-active forward flexion 90°, external rotation 20°, 3 out of 5 strength in both planes of motion, maximal tenderness palpation along anterior posterior joint lines with crepitus on active and passive motion.  Positive external rotation lag sign, positive drop arm test.      Imaging:  Left Shoulder X-Ray  Indication: Pain  AP, scapular Y, and axillary lateral views    Findings:  No fracture  Normal soft tissues  Advanced glenohumeral arthritic changes noted with bone-on-bone articulation particular noted on axillary lateral view, moderate osteophyte change along inferior glenoid as well as medial calcar of proximal humerus  No prior studies were available for comparison.    Assessment:       1. Pain    2. Complex tear of medial meniscus of left knee as current injury, subsequent encounter    3. Insufficiency fracture of tibia, initial encounter    4. Primary osteoarthritis of left knee    5. Osteoarthrosis, localized, primary, shoulder region, left          Plan:  KENDRICK query complete.          Discussed treatment options at length with patient at today's visit. Patient is happy with progress that she has made with her knees no she still has significant pain in the region of her subchondral bone edema on prior MRI.  We will see how her response does over the next 6 weeks and if she still has focal pain in this region we may consider arthroscopy with acromioplasty the medial proximal tibia.    In regards to her left shoulder discussed options including anti-inflammatory medications, intra-articular injection, reverse shoulder arthroplasty.  She was to hold off on surgery to her left shoulder at this time.  We will proceed with injection today to try to improve her pain and function    Joya Singh was in agreement with plan and had all questions answered.     Orders:  Orders  Placed This Encounter   Procedures   • Large Joint Arthrocentesis   • XR Shoulder 2+ View Left       Medications:  No orders of the defined types were placed in this encounter.      Followup:  Return in about 6 weeks (around 8/18/2017).          Dragon transcription disclaimer     Much of this encounter note is an electronic transcription/translation of spoken language to printed text. The electronic translation of spoken language may permit erroneous, or at times, nonsensical words or phrases to be inadvertently transcribed. Although I have reviewed the note for such errors, some may still exist.  Large Joint Arthrocentesis  Date/Time: 7/7/2017 10:43 AM  Consent given by: patient  Site marked: site marked  Timeout: Immediately prior to procedure a time out was called to verify the correct patient, procedure, equipment, support staff and site/side marked as required   Supporting Documentation  Indications: pain   Procedure Details  Location: shoulder - L glenohumeral  Needle size: 22 G  Approach: anterior  Medications administered: 4 mL lidocaine PF 1% 1 %; 12 mg betamethasone acetate-betamethasone sodium phosphate 6 (3-3) MG/ML  Patient tolerance: patient tolerated the procedure well with no immediate complications

## 2017-07-13 RX ORDER — LIDOCAINE HYDROCHLORIDE 10 MG/ML
4 INJECTION, SOLUTION EPIDURAL; INFILTRATION; INTRACAUDAL; PERINEURAL
Status: COMPLETED | OUTPATIENT
Start: 2017-07-07 | End: 2017-07-07

## 2017-07-13 RX ORDER — BETAMETHASONE SODIUM PHOSPHATE AND BETAMETHASONE ACETATE 3; 3 MG/ML; MG/ML
12 INJECTION, SUSPENSION INTRA-ARTICULAR; INTRALESIONAL; INTRAMUSCULAR; SOFT TISSUE
Status: COMPLETED | OUTPATIENT
Start: 2017-07-07 | End: 2017-07-07

## 2017-07-15 DIAGNOSIS — Z79.4 TYPE 2 DIABETES MELLITUS WITH DIABETIC AUTONOMIC NEUROPATHY, WITH LONG-TERM CURRENT USE OF INSULIN (HCC): ICD-10-CM

## 2017-07-15 DIAGNOSIS — E11.43 TYPE 2 DIABETES MELLITUS WITH DIABETIC AUTONOMIC NEUROPATHY, WITH LONG-TERM CURRENT USE OF INSULIN (HCC): ICD-10-CM

## 2017-07-15 DIAGNOSIS — M54.5 CHRONIC LOW BACK PAIN, UNSPECIFIED BACK PAIN LATERALITY, WITH SCIATICA PRESENCE UNSPECIFIED: ICD-10-CM

## 2017-07-15 DIAGNOSIS — G89.29 CHRONIC LOW BACK PAIN, UNSPECIFIED BACK PAIN LATERALITY, WITH SCIATICA PRESENCE UNSPECIFIED: ICD-10-CM

## 2017-07-17 ENCOUNTER — TELEPHONE (OUTPATIENT)
Dept: INTERNAL MEDICINE | Facility: CLINIC | Age: 77
End: 2017-07-17

## 2017-07-17 DIAGNOSIS — S83.232A COMPLEX TEAR OF MEDIAL MENISCUS OF LEFT KNEE AS CURRENT INJURY, INITIAL ENCOUNTER: ICD-10-CM

## 2017-07-17 RX ORDER — GABAPENTIN 400 MG/1
CAPSULE ORAL
Qty: 120 CAPSULE | Refills: 3 | Status: SHIPPED | OUTPATIENT
Start: 2017-07-17 | End: 2017-10-09 | Stop reason: SDUPTHER

## 2017-07-17 RX ORDER — HYDROCODONE BITARTRATE AND ACETAMINOPHEN 5; 325 MG/1; MG/1
1 TABLET ORAL EVERY 12 HOURS PRN
Qty: 60 TABLET | Refills: 0 | Status: SHIPPED | OUTPATIENT
Start: 2017-07-17 | End: 2017-10-04 | Stop reason: SDUPTHER

## 2017-07-17 RX ORDER — DULOXETIN HYDROCHLORIDE 60 MG/1
CAPSULE, DELAYED RELEASE ORAL
Qty: 90 CAPSULE | Refills: 1 | Status: SHIPPED | OUTPATIENT
Start: 2017-07-17 | End: 2017-10-09 | Stop reason: SDUPTHER

## 2017-07-17 RX ORDER — OMEPRAZOLE 40 MG/1
CAPSULE, DELAYED RELEASE ORAL
Qty: 90 CAPSULE | Refills: 1 | Status: SHIPPED | OUTPATIENT
Start: 2017-07-17 | End: 2017-10-09 | Stop reason: SDUPTHER

## 2017-07-17 NOTE — TELEPHONE ENCOUNTER
----- Message from Keren Garcia MD sent at 7/17/2017  9:53 AM EDT -----  I instructed her to not stop her coumadin, so she should immediately restart and return for check before the end of the week.     I can fill hydrocodone and we can look to see if we have samples.     ----- Message -----     From: Gaviota Bowles MA     Sent: 7/17/2017   8:23 AM       To: Keren Garcia MD     PER PTSHE STOPPED HER COUMADIN ON Thursday, SAID SHE HAD NEVER DONE THIS BEFORE, HAVING TOOTH PULLED TODAY, WANTS TO KNOW WHEN SHE SHOULD START BACK ON COUMADIN.    WANTS SAMPLES OF   TRESIBA  IF WE HAVE SOME.   ALSO WANTS A FILL  ON HER HYDROCODONE   LOV   7/7/17  [facility]      ----- Message -----     From: Keren Garcia MD     Sent: 7/14/2017   1:00 PM       To: Gaviota Bowles MA    I would just wait until she is seen by the dentist before we take her off her coumadin. If he feels that she needs surgery, then we can discuss making changes.   ----- Message -----     From: Gaviota Bowles MA     Sent: 7/14/2017  11:21 AM       To: Keren Garcia MD        ----- Message -----     From: Keren Parmar     Sent: 7/14/2017  11:06 AM       To: [facility] [place]    PT WANTS TO KNOW IF SHE CAN COME OFF HER COUMADIN UNTIL Monday. SHE HAS A TOOTH ACHE AND IS GOING TO THE DENTIST ON Monday AFTERNOON. CAN WE CALL HER BACK AND DISCUSS? CALL BACK [phone].

## 2017-07-17 NOTE — TELEPHONE ENCOUNTER
----- Message from Keren Garcia MD sent at 7/17/2017  9:53 AM EDT -----  I instructed her to not stop her coumadin, so she should immediately restart and return for check before the end of the week.     I can fill hydrocodone and we can look to see if we have samples.     ----- Message -----     From: Gaviota Bowles MA     Sent: 7/17/2017   8:23 AM       To: Keren Garcia MD     PER PTSHE STOPPED HER COUMADIN ON Thursday, SAID SHE HAD NEVER DONE THIS BEFORE, HAVING TOOTH PULLED TODAY, WANTS TO KNOW WHEN SHE SHOULD START BACK ON COUMADIN.    WANTS SAMPLES OF   TRESIBA  IF WE HAVE SOME.   ALSO WANTS A FILL  ON HER HYDROCODONE   LOV   7/7/17  [facility]      ----- Message -----     From: Keren Garcia MD     Sent: 7/14/2017   1:00 PM       To: Gaviota Bowles MA    I would just wait until she is seen by the dentist before we take her off her coumadin. If he feels that she needs surgery, then we can discuss making changes.   ----- Message -----     From: Gaviota Bowles MA     Sent: 7/14/2017  11:21 AM       To: Keren Garcia MD        ----- Message -----     From: Keren Parmar     Sent: 7/14/2017  11:06 AM       To: Izabella DuongChasity [facility] [place]    PT WANTS TO KNOW IF SHE CAN COME OFF HER COUMADIN UNTIL Monday. SHE HAS A TOOTH ACHE AND IS GOING TO THE DENTIST ON Monday AFTERNOON. CAN WE CALL HER BACK AND DISCUSS? CALL BACK [phone].

## 2017-07-17 NOTE — TELEPHONE ENCOUNTER
Left message for pt to tiff for recheck of coumadin once she starts back on medication tonight,  Check on Friday.

## 2017-07-21 ENCOUNTER — TELEPHONE (OUTPATIENT)
Dept: INTERNAL MEDICINE | Facility: CLINIC | Age: 77
End: 2017-07-21

## 2017-07-21 ENCOUNTER — ANTICOAGULATION VISIT (OUTPATIENT)
Dept: INTERNAL MEDICINE | Facility: CLINIC | Age: 77
End: 2017-07-21

## 2017-07-21 DIAGNOSIS — I82.409 ACUTE DEEP VEIN THROMBOSIS (DVT) OF OTHER VEIN OF LOWER EXTREMITY: Primary | ICD-10-CM

## 2017-07-21 LAB — INR PPP: 1.2 (ref 2–3)

## 2017-07-21 PROCEDURE — 36416 COLLJ CAPILLARY BLOOD SPEC: CPT | Performed by: INTERNAL MEDICINE

## 2017-07-21 PROCEDURE — 85610 PROTHROMBIN TIME: CPT | Performed by: INTERNAL MEDICINE

## 2017-07-21 NOTE — TELEPHONE ENCOUNTER
----- Message from Keren Agata sent at 7/21/2017  9:42 AM EDT -----  Pt states she takes hydrocodone 5mg. She is supposed to have these every 8 hrs. They were filled for 12 hrs. She is leaving on vacation tomorrow and thinks she wont have enough. Can we look in to this. Call back is [phone]. Pt will have enough for vacation, but will run out sooner than she should.         I looked back on med list this has been prescribed for every 12 hours (bid dosing) for long time.  Pt must be taking too many.  I called and left message for pt to take medication as prescribed as she was not suppose to be taking medication every 8 hours.  I told her she had to take as written as we will not be filling her medication early

## 2017-07-28 DIAGNOSIS — R05.3 CHRONIC COUGH: ICD-10-CM

## 2017-07-28 DIAGNOSIS — R23.0 PERIORAL CYANOSIS: Primary | ICD-10-CM

## 2017-07-28 DIAGNOSIS — K22.9 ESOPHAGEAL ABNORMALITY: ICD-10-CM

## 2017-07-31 ENCOUNTER — ANTICOAGULATION VISIT (OUTPATIENT)
Dept: INTERNAL MEDICINE | Facility: CLINIC | Age: 77
End: 2017-07-31

## 2017-07-31 DIAGNOSIS — R13.10 DYSPHAGIA, UNSPECIFIED TYPE: Primary | ICD-10-CM

## 2017-07-31 LAB — INR PPP: 2.4 (ref 0.9–1.1)

## 2017-07-31 PROCEDURE — 36416 COLLJ CAPILLARY BLOOD SPEC: CPT | Performed by: INTERNAL MEDICINE

## 2017-07-31 PROCEDURE — 85610 PROTHROMBIN TIME: CPT | Performed by: INTERNAL MEDICINE

## 2017-08-01 ENCOUNTER — HOSPITAL ENCOUNTER (OUTPATIENT)
Dept: GENERAL RADIOLOGY | Facility: HOSPITAL | Age: 77
Discharge: HOME | End: 2017-08-01
Attending: INTERNAL MEDICINE

## 2017-08-01 ENCOUNTER — HOSPITAL ENCOUNTER (OUTPATIENT)
Dept: GENERAL RADIOLOGY | Facility: HOSPITAL | Age: 77
Discharge: HOME | End: 2017-08-01
Attending: INTERNAL MEDICINE | Admitting: INTERNAL MEDICINE

## 2017-08-01 ENCOUNTER — TELEPHONE (OUTPATIENT)
Dept: INTERNAL MEDICINE | Facility: CLINIC | Age: 77
End: 2017-08-01

## 2017-08-01 ENCOUNTER — OFFICE VISIT (OUTPATIENT)
Dept: SURGERY | Facility: CLINIC | Age: 77
End: 2017-08-01

## 2017-08-01 VITALS
HEIGHT: 63 IN | WEIGHT: 215 LBS | DIASTOLIC BLOOD PRESSURE: 80 MMHG | BODY MASS INDEX: 38.09 KG/M2 | SYSTOLIC BLOOD PRESSURE: 122 MMHG

## 2017-08-01 DIAGNOSIS — L72.3 SEBACEOUS CYST: Primary | ICD-10-CM

## 2017-08-01 DIAGNOSIS — K22.9 ESOPHAGEAL ABNORMALITY: ICD-10-CM

## 2017-08-01 DIAGNOSIS — R13.10 DYSPHAGIA, UNSPECIFIED TYPE: Primary | ICD-10-CM

## 2017-08-01 DIAGNOSIS — R05.3 CHRONIC COUGH: ICD-10-CM

## 2017-08-01 PROCEDURE — 74230 X-RAY XM SWLNG FUNCJ C+: CPT

## 2017-08-01 PROCEDURE — 92611 MOTION FLUOROSCOPY/SWALLOW: CPT

## 2017-08-01 PROCEDURE — G8997 SWALLOW GOAL STATUS: HCPCS

## 2017-08-01 PROCEDURE — 74220 X-RAY XM ESOPHAGUS 1CNTRST: CPT

## 2017-08-01 PROCEDURE — 99202 OFFICE O/P NEW SF 15 MIN: CPT | Performed by: SURGERY

## 2017-08-01 PROCEDURE — G8996 SWALLOW CURRENT STATUS: HCPCS

## 2017-08-01 PROCEDURE — G8998 SWALLOW D/C STATUS: HCPCS

## 2017-08-01 NOTE — PROGRESS NOTES
PATIENT INFORMATION  Joya Singh  CYST ON BACK, SOME DRAINAGE, WAS ON SOME ABX BUT DOESN'T REMEMBER THE NAME     - 1940    CHIEF COMPLAINT  Chief Complaint   Patient presents with   • Cyst       HISTORY OF PRESENT ILLNESS  HPI she complains of long-standing mass on her back that increased in size and became tender and then spontaneously drained.  She was placed on a cream and antibiotic and it has improved.  She has had a prior stroke and is on Coumadin for that.        REVIEW OF SYSTEMS  Review of Systems   Constitutional: Negative.    HENT: Negative.    Eyes: Negative.    Respiratory: Negative.    Cardiovascular: Negative.    Gastrointestinal: Negative.    Endocrine: Negative.    Genitourinary: Negative.    Musculoskeletal: Negative.    Skin: Negative.    Allergic/Immunologic: Negative.    Neurological: Negative.    Hematological: Negative.    Psychiatric/Behavioral: Negative.          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]   • Mechanical knee pain [M25.569]   • Anxiety [F41.9]   • Encounter for other specified aftercare [Z51.89]   • Gastroesophageal reflux disease [K21.9]   • History of biliary T-tube placement [Z98.890]   • Acquired hypothyroidism [E03.9]   • Warfarin toxicity [T45.511A]   • Stress at home [F43.9]   • Osteoarthrosis, localized, primary, shoulder region [M19.019]   • Rotator cuff tendonitis [M75.80]   • Diabetic gastroparesis [E11.43, K31.84]   • Grief [F43.20]   • Warfarin anticoagulation [Z79.01]   • Abdominal pain [R10.9]   • Deep vein  thrombosis of lower extremity [I82.409]   • Transient global amnesia [G45.4]   • Arthritis [M19.90]   • Chronic back pain [M54.9, G89.29]   • Chronic coronary artery disease [I25.10]     Overview Note:     Description: last cath 8/26/13 showed non-critical disease, no stents placed     • Chronic anemia [D64.9]   • Chronic kidney disease [N18.9]   • Constipation [K59.00]   • Cough [R05]   • Depression [F32.9]   • Type 2 diabetes mellitus with neurologic complication [E11.49]   • 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  EF normal.     • Speech disturbance [R47.9]   • Sinusitis [J32.9]   • Swelling of lower extremity [M79.89]   • Viral infection [B34.9]         PAST MEDICAL HISTORY  Past Medical History:   Diagnosis Date   • Allergic rhinitis    • Anxiety    • Arthritis    • Bell's palsy    • Depression    • Diabetes mellitus    • Disease of thyroid gland    • H/O blood clots    • Hyperlipidemia    • Hypertension    • Kidney disease    • AARON (obstructive sleep apnea)    • Stroke    • TIA (transient ischemic attack)          SURGICAL HISTORY  Past Surgical History:   Procedure Laterality Date   • BACK SURGERY     • CHOLECYSTECTOMY     • COLONOSCOPY  2011    due for repeat in 2021   • HYSTERECTOMY     • SPINE SURGERY     • UPPER GASTROINTESTINAL ENDOSCOPY  2014    gastritis.  done by dr. hastings         FAMILY HISTORY  Family History   Problem Relation Age of Onset   • Lupus  Mother    • Heart disease Other    • Hypertension Other          SOCIAL HISTORY  Social History     Occupational History   • Not on file.     Social History Main Topics   • Smoking status: Never Smoker   • Smokeless tobacco: Never Used   • Alcohol use No   • Drug use: No   • Sexual activity: Defer      Comment: EXERCISE - RARELY         CURRENT MEDICATIONS    Current Outpatient Prescriptions:   •  ACCU-CHEK FASTCLIX LANCETS misc, 1 each 3 (three) times a day. TEST THREE TIMES DAILY TO FOUR TIMES DAILY AS DIRECTED, Disp: 102 each, Rfl: 11  •  acetaminophen (TYLENOL) 500 MG tablet, Take by mouth., Disp: , Rfl:   •  Alcohol Swabs (B-D SINGLE USE SWABS REGULAR) pads, , Disp: , Rfl:   •  aspirin 325 MG tablet, Take 325 mg by mouth Daily., Disp: , Rfl:   •  atorvastatin (LIPITOR) 10 MG tablet, Take one (1) tablet orally (by mouth) once daily, Disp: 90 tablet, Rfl: 3  •  Blood Glucose Monitoring Suppl (ACCU-CHEK KENNETH SMARTVIEW) W/DEVICE kit, USE AS DIRECTED, Disp: , Rfl:   •  buPROPion SR (WELLBUTRIN SR) 150 MG 12 hr tablet, Take 1 tablet by mouth Every Night., Disp: , Rfl:   •  Calcium Carb-Cholecalciferol (CALCIUM PLUS VITAMIN D3 PO), Take by mouth., Disp: , Rfl:   •  cyclobenzaprine (FLEXERIL) 10 MG tablet, Take one (1) tablet orally (by mouth) three times daily as needed, Disp: 90 tablet, Rfl: 1  •  DULoxetine (CYMBALTA) 60 MG capsule, Take 1 capsule by mouth Daily., Disp: 90 capsule, Rfl: 1  •  furosemide (LASIX) 20 MG tablet, Take one (1) tablet orally (by mouth) once daily, Disp: 90 tablet, Rfl: 1  •  gabapentin (NEURONTIN) 400 MG capsule, Take 1 in am 1 afternoon and 2 at bedtime, Disp: 120 capsule, Rfl: 3  •  glucose blood (ACCU-CHEK SMARTVIEW) test strip, Accu-Chek SmartView In Vitro Strip TEST THREE TIMES DAILY  TO FOUR TIMES DAILY AS DIRECTED, Disp: 300 each, Rfl: 3  •  HYDROcodone-acetaminophen (NORCO) 5-325 MG per tablet, Take 1 tablet by mouth Every 12 (Twelve) Hours As Needed for Moderate Pain ., Disp: 60  "tablet, Rfl: 0  •  insulin aspart (NOVOLOG FLEXPEN) 100 UNIT/ML solution pen-injector sc pen, Inject 20 Units under the skin 3 (Three) Times a Day With Meals., Disp: 10 pen, Rfl: 1  •  levothyroxine (SYNTHROID, LEVOTHROID) 75 MCG tablet, Take 1 tablet by mouth Daily., Disp: 90 tablet, Rfl: 1  •  lisinopril (PRINIVIL,ZESTRIL) 5 MG tablet, Take one (1) tablet orally (by mouth) once daily, Disp: 90 tablet, Rfl: 3  •  metoprolol tartrate (LOPRESSOR) 25 MG tablet, Take 1/2 tablet orally (by mouth) twice daily, Disp: 90 tablet, Rfl: 1  •  Needle, Disp, (BD DISP NEEDLES) 30G X 1/2\" misc, To be used 3 times daily with Novolog Flexpen., Disp: 100 each, Rfl: 5  •  omeprazole (priLOSEC) 40 MG capsule, Take 1 capsule by mouth Daily., Disp: 90 capsule, Rfl: 1  •  potassium chloride (K-DUR) 10 MEQ CR tablet, Take one (1) tablet orally (by mouth) daily, Disp: 90 tablet, Rfl: 2  •  TRESIBA FLEXTOUCH 200 UNIT/ML solution pen-injector, , Disp: , Rfl:   •  ULTICARE MICRO PEN NEEDLES 32G X 4 MM misc, , Disp: , Rfl:   •  ULTICARE PEN NEEDLES 29G X 12MM misc, , Disp: , Rfl:   •  warfarin (COUMADIN) 5 MG tablet, Alternate 1 tablet by mouth one evening and then 1 1/2 tab by mouth the next evening, Disp: 60 tablet, Rfl: 1    ALLERGIES  Morphine and related; Codeine; Morphine; and Penicillins    VITALS  Vitals:    08/01/17 0929   BP: 122/80   Weight: 215 lb (97.5 kg)   Height: 62.5\" (158.8 cm)       LAST RESULTS   Anticoagulation Visit on 07/31/2017   Component Date Value Ref Range Status   • INR 07/31/2017 2.40* 0.9 - 1.1 Final     Xr Shoulder 2+ View Left    Result Date: 7/7/2017  Impression: Ordering physician's impression is located in the Encounter Note dated 07/07/17.     Mri Thoracic Spine With & Without Contrast    Result Date: 7/7/2017  Narrative: EXAM:  MRI THORACIC SPINE HISTORY:  77-year-old patient with a draining sebaceous cyst lower thoracic, upper lumbar spine region, please exclude a tract or connection of the cyst to the " thoracic spine, patient has had a boil type sore for 2-3 months with continued draining.  She has taken antibiotics, but the drainage persists.  Surgery of the lumbar spine 15 years ago, marker placed at area of interest. TECHNIQUE:  MRI of the thoracic spine was performed prior to and following intravenous administration of 10 mL of MultiHance. Reduced dose given secondary to EGFR at preliminary testing of 33.  FINDINGS:  Mild exaggeration of thoracic kyphosis with multiple level anterior endplate spondylosis mid to lower thoracic spine.  The thoracic spine marrow signal intensity is essentially within normal limits.  Partly seen are remote postoperative changes in the cervical spine  Thoracic intervertebral discs are desiccated in general.  The thoracic cord is normal in size and signal intensity.  There is a marker overlying the soft tissue lesion in the posterior midline skin to subcutaneous fat area.  Soft tissue lesion is approximately at the level of the upper L1 vertebral body.  It is predominantly ovoid in configuration and measures about 1.7 cm AP dimension, 1.3 cm SI dimension, and 0.9 cm medial lateral dimension.  It is heterogeneously high in signal intensity on T2-weighted imaging.  Flow and signal intensity on T1-weighted imaging shows predominantly rim-like enhancement and enhancement of surrounding edema on postcontrast fat-saturated T1-weighted imaging.  The lesion is confined to the skin and subcutaneous fat and does not appear to communicate with the spine.  It is most consistent with an infected skin lesion, but histiologic diagnosis would be most helpful. There is a right paramedian disc extrusion at T8-9 which remains contiguous with the disc, but extends above and below the level of the disc.  It is about 1.4 cm SI dimension, 0.5 cm AP dimension and 1.1 cm medial lateral dimension.  It results in mild mass effect on the right anterolateral cord and thecal sac, but the cord is still surrounded  by CSF.  There is predominantly lower thoracic facet arthritis.  There is a small central protrusion at T7-8 with some flattening of the anterior thecal sac and T6-7 with some flattening of the thecal sac, but no significant canal stenosis at either of these levels.  On the postcontrast imaging, there is no pathologic intracanalicular enhancement suspected.     Impression: 1. There is a soft tissue lesion seen centered at the midline posterior skin into the subcutaneous fat about the level of L1.  It measures about 1.3 x 1.7 x 0.9 cm dimension.  It shows predominantly rim-like enhancement and is most consistent with an infected skin lesion such as a sebaceous cyst is suspected clinically.  Tissue diagnosis would be recommended to exclude neoplastic disease.  There is nothing to indicate fistulous connection with the underlying thoracic-lumbar spine. 2. There are some thoracic degenerative changes most prominent at T8-9 where there is a right paramedian extrusion, but there is no significant thoracic canal stenosis.     Xr Chest Pa & Lateral    Result Date: 7/7/2017  Narrative: CHEST X-RAY, 07/07/2017:  HISTORY: 77-year-old female complaining of chronic cough, present for several years.  TECHNIQUE: PA and lateral upright chest x-ray.  FINDINGS: The lungs are expanded and clear. No evidence of acute or chronic lung disease. No pleural effusion. Heart size and pulmonary vascularity are normal. Postop changes lower cervical spine fusion surgery.      Impression: Negative chest.  This report was finalized on 7/7/2017 12:46 PM by Dr. Jacques Bates MD.        PHYSICAL EXAM  Physical Exam set elderly white female in no active distress she has a Bell's palsy.  On her lower central back she has a small area of induration consistent with a prior infected sebaceous cyst site.  There is no ongoing abscess.    ASSESSMENT  Infected sebaceous cyst      PLAN  The risks benefits and options were discussed with her in detail.  To  remove this we would need to have her off her anticoagulation for 3 days and she understands that this will increase her risk for a stroke.  She wants to proceed this has been arranged  for next week as an office surgery.

## 2017-08-01 NOTE — MBS/VFSS/FEES
Outpatient Speech Language Pathology   Adult Swallow Initial Evaluation   Modified Barium Swallow Study  VICKY Sharma     Patient Name: Joya Singh  : 1940  MRN: [id number]  Today's Date: 2017         Visit Date: 2017   Patient Active Problem List   Diagnosis   • Abdominal pain   • Deep vein thrombosis of lower extremity   • Transient global amnesia   • Arthritis   • Chronic back pain   • Chronic coronary artery disease   • Chronic anemia   • Chronic kidney disease   • Constipation   • Cough   • Depression   • Type 2 diabetes mellitus with neurologic complication   • Brittle diabetes mellitus   • Diarrhea   • Difficulty in urination   • Dizziness   • Dysuria   • Fall in home   • Fatigue   • Gastroparesis   • Hyperlipidemia   • Hypertension   • Insomnia with sleep apnea   • Spasm   • Nasal congestion   • Nausea   • Obstructive sleep apnea syndrome   • Peripheral neuropathy   • Poor balance   • Pulmonary hypertension   • Speech disturbance   • Sinusitis   • Swelling of lower extremity   • Viral infection   • Warfarin anticoagulation   • Diabetic gastroparesis   • Grief   • Osteoarthrosis, localized, primary, shoulder region   • Rotator cuff tendonitis   • Stress at home   • Warfarin toxicity   • Acquired hypothyroidism   • Anxiety   • Encounter for other specified aftercare   • Gastroesophageal reflux disease   • History of biliary T-tube placement   • Left leg pain   • Subtherapeutic anticoagulation   • CKD stage 3 due to type 2 diabetes mellitus   • Mechanical knee pain   • Complex tear of medial meniscus of left knee as current injury   • Insufficiency fracture of tibia   • DVT of lower extremity (deep venous thrombosis) (aka DVT OF LOWER EXTREMITY (DEEP VENOUS THROMBOSIS))   • Primary osteoarthritis of left knee        Past Medical History:   Diagnosis Date   • Allergic rhinitis    • Anxiety    • Arthritis    • Bell's palsy    • Depression    • Diabetes mellitus    • Disease of thyroid gland    •  "H/O blood clots    • Hyperlipidemia    • Hypertension    • Kidney disease    • AARON (obstructive sleep apnea)    • Stroke    • TIA (transient ischemic attack)         Past Surgical History:   Procedure Laterality Date   • BACK SURGERY     • CHOLECYSTECTOMY     • COLONOSCOPY  2011    due for repeat in 2021   • HYSTERECTOMY     • SPINE SURGERY     • UPPER GASTROINTESTINAL ENDOSCOPY  2014    gastritis.  done by dr. hastings         Visit Dx:     ICD-10-CM ICD-9-CM   1, Dysphagia, unspecified R13.10 787.20   2. Chronic cough R05 786.2   3. Esophageal abnormality K22.9 530.9             Adult Dysphagia - 08/01/17 0902     Adult Dysphagia Background    Patient Description of Complaint Patient reports chronic cough with episodes where she turns blue/purple.   -AD    Date of Onset July 7, 2017   Choking is ongoing over the last 5 years, worsening 3 mths.  -AD    Symptoms Reported by Patient Coughing;Choking  -AD    Patient Report of Functional Impact Coughing makes it difficult to breath at times.   -AD    List Pertinent Medications See meds in chart.  -AD    History Pertinent to Diagnosis Pt reports recent choking episodes with one episode of turning blue. She states a DrStephany told her years ago that she had a 'hole' in her esophagus. Hx of c-spine surgery with [facility] place remotely, Right Singh's palsy 5 + years ago, right carotid surgery about 5 years ago. She also reports that she has had some TIAs and that she has been told that her head is \"full of blood clots\".   -AD    Current Diet (Solids) Regular  -AD    Diet Level (Liquids) Thin Liquid  -AD    Oral Mech    Respiratory/Swallow Coordination Pattern WFL  -AD    Respiratory/Swallow Coordination Pattern Details Einmda-Fccgjnb-Rjewdd  -AD    Position During Evaluation Upright   standing  -AD    Anatomy/Physiology WNL Other (comment)   rt facial droop; lt lingual wkns; left lingual ROM WFL  -AD    Dentition Patient has own teeth  -AD    Oral Health Oral cavity is " clean;Teeth/dentures are clean  -AD    Awareness/Control of Secretions Patient swallows saliva  -AD    VFSS Exam    Study Completed By SLP and Radiologist (comment)   Dr Bates  -AD    Textures Presented Thin;Nectar;Honey;Pudding;Solid  -AD    Position During Study/Views Obtained Upright;Lateral View   standing  -AD    Physiologic Impairments Noted on VFSS    Physiologic Impairments Noted on VFSS Other (comment);No impairments noted in oral or pharyngeal phase   Esophagram completed via Radiologist. See report.  -AD    Other Physiologic Impairments Noted transient penetration of thins during the swallow that was spontaneously expelled during the swallow. Very shallow. Normal variant for this aged patient. Also noted cough during MBS with no noted material in the vestibule or trachea. Unrelated to oropharyngeal swallow.  -AD    Symptoms    Aspiration None noted  -AD    Residue Noted No significant residue  -AD    Compensatory Strategies None needed  -AD    Esophageal Phase    Per Radiology, the esophageal phase: Other (comment)   See radiologist's esophagram report.  -AD    SLP Communication to Radiology    Summary Statement Patient presents with a functional oropharyngeal swallow with no risk of aspiration at this time on thin liquids, nectar and honey thick liquids, puree or solid consistencies. No impairment noted in the oral or pharyngeal phase and no significant residue noted on any consistency tested.   -AD    Results/Recs/Barriers/Education for Dysphagia    Factors Affecting Performance no difficulty participating in study  -AD    Learning Motivation strong  -AD    Education/Learning Comments Pt demonstrates good ability to learn. Demonstrates understanding of normal eval results.  -AD    Clinical Impression: Swallowing WNL  -AD    Prognosis Comment Good for continued tolerance of regular diet with thin liquids.  -AD    Impact on Function no impact on function  -AD    Recommendations for Diet/Nutirition full  oral diet   Regular diet with thin liquids  -AD    Swallowing Precautions discuss medical management of reflux with Physician;upright position for at least 30 minutes after meals  -AD    Upright Position for at Least 30 Minutes After Meals due to hx of relfux  -AD    Recommendations no dysphagia therapy indicated  -AD    Frequency evaluation only  -AD      User Key  (r) = Recorded By, (t) = Taken By, (c) = Cosigned By    Initials Name Provider Type    CLEVE Villa MS CCC-SLP Speech Therapist                OP SLP Education       08/01/17 0902    Education    Barriers to Learning No barriers identified  -AD    Education Provided Described results of evaluation;Patient expressed understanding of evaluation  -AD    Assessed Learning needs;Learning motivation;Learning preferences;Learning readiness  -AD    Learning Motivation Strong  -AD    Learning Method Explanation;Demonstration  -AD    Teaching Response Verbalized understanding  -AD      User Key  (r) = Recorded By, (t) = Taken By, (c) = Cosigned By    Initials Name Effective Dates    CLEVE Villa MS CCC-SLP 06/22/16 -                 OP SLP Assessment/Plan - 08/01/17 0902     SLP Assessment    Functional Problems Swallowing  -AD    Clinical Impression: Swallowing WNL  -AD    SLP Diagnosis Oropharyngeal swallow WFL  -AD    Prognosis Good (comment)   for tolerance of a regular diet with thin liquids.  -AD    Patient/caregiver participated in establishment of treatment plan and goals Yes  -AD    Patient would benefit from skilled therapy intervention No  -AD    SLP Plan    Frequency evaluation only  -AD    Planned CPT's? SLP MBS: 40554  -AD    Plan Comments Refer back to primary care for follow up regarding esophagram.  -AD      User Key  (r) = Recorded By, (t) = Taken By, (c) = Cosigned By    Initials Name Provider Type    CLEVE Villa MS CCC-SLP Speech Therapist        Time Calculation:   SLP Start Time: 0835  SLP Stop Time: 0902  SLP Time  Calculation (min): 27 min  SLP Non-Billable Time (min): 0 min  Total Timed Code Minutes- SLP: 0 minute(s)    Therapy Charges for Today     Code Description Service Date Service Provider Modifiers Qty    36751987435 HC ST SWALLOWING CURRENT STATUS 8/1/2017 Dahiana Villa, MS CCC-SLP GN, CH 1    71305706100 HC ST SWALLOWING PROJECTED 8/1/2017 Dahiana Villa MS CCC-SLP GN, CH 1    21174399157 HC ST SWALLOWING DISCHARGE 8/1/2017 Dahiana Villa MS CCC-SLP GN, CH 1    69337046058 HC ST MOTION FLUORO EVAL SWALLOW 2 8/1/2017 Dahiana iVlla MS CCC-SLP GN 1          SLP G-Codes  SLP NOMS Used?: Yes  Functional Limitations: Swallowing  Swallow Current Status (): 0 percent impaired, limited or restricted  Swallow Goal Status (): 0 percent impaired, limited or restricted  Swallow Discharge Status (): 0 percent impaired, limited or restricted        Dahiana Villa MS CCC-SLP  8/1/2017

## 2017-08-01 NOTE — TELEPHONE ENCOUNTER
----- Message from Keren Garcia MD sent at 8/1/2017  1:08 PM EDT -----  Please call patient with these results and let her know that her swallow study was normal, but speech will make official recommendations regarding changes if they need to be made.      Pt was given the above info.dg

## 2017-08-02 ENCOUNTER — TELEPHONE (OUTPATIENT)
Dept: INTERNAL MEDICINE | Facility: CLINIC | Age: 77
End: 2017-08-02

## 2017-08-02 RX ORDER — CYCLOBENZAPRINE HCL 10 MG
10 TABLET ORAL 3 TIMES DAILY PRN
Qty: 30 TABLET | Refills: 0 | Status: SHIPPED | OUTPATIENT
Start: 2017-08-02 | End: 2017-08-04

## 2017-08-02 NOTE — TELEPHONE ENCOUNTER
Rx sent in. Patient already scheduled with Dr. Mercado in September    ----- Message from Keren Garcia MD sent at 8/2/2017  2:19 PM EDT -----  Regarding: RE: muscle relaxer  Please refill the flexeril that she has on her med list and I will sign. No more than #30 with no refills. Make sure she has follow up with Dr. Mercado soon.  ----- Message -----     From: Beth Farley MA     Sent: 8/2/2017  11:59 AM       To: Keren Garcia MD  Subject: FW: muscle relaxer                                   ----- Message -----     From: Heydi Jackson     Sent: 8/2/2017  11:39 AM       To: [facility] Clinical Pool  Subject: muscle relaxer                                   BROWN    Patient got muscle spasm when traveling to [place].  Back now and spasms are continuing and getting worse   Can we phone in a muscle relaxer?    [facility] [place] [facility]    [phone]

## 2017-08-04 ENCOUNTER — OFFICE VISIT (OUTPATIENT)
Dept: INTERNAL MEDICINE | Facility: CLINIC | Age: 77
End: 2017-08-04

## 2017-08-04 ENCOUNTER — ANTICOAGULATION VISIT (OUTPATIENT)
Dept: INTERNAL MEDICINE | Facility: CLINIC | Age: 77
End: 2017-08-04

## 2017-08-04 ENCOUNTER — TELEPHONE (OUTPATIENT)
Dept: INTERNAL MEDICINE | Facility: CLINIC | Age: 77
End: 2017-08-04

## 2017-08-04 VITALS
WEIGHT: 213 LBS | HEART RATE: 86 BPM | BODY MASS INDEX: 37.74 KG/M2 | HEIGHT: 63 IN | SYSTOLIC BLOOD PRESSURE: 128 MMHG | OXYGEN SATURATION: 93 % | DIASTOLIC BLOOD PRESSURE: 84 MMHG

## 2017-08-04 DIAGNOSIS — I82.409 ACUTE DEEP VEIN THROMBOSIS (DVT) OF OTHER VEIN OF LOWER EXTREMITY: Primary | ICD-10-CM

## 2017-08-04 DIAGNOSIS — E03.9 ACQUIRED HYPOTHYROIDISM: ICD-10-CM

## 2017-08-04 DIAGNOSIS — R79.9 ABNORMAL BLOOD CHEMISTRY LEVEL: ICD-10-CM

## 2017-08-04 DIAGNOSIS — M62.830 BACK MUSCLE SPASM: ICD-10-CM

## 2017-08-04 DIAGNOSIS — Z79.4 TYPE 2 DIABETES MELLITUS WITH DIABETIC AUTONOMIC NEUROPATHY, WITH LONG-TERM CURRENT USE OF INSULIN (HCC): ICD-10-CM

## 2017-08-04 DIAGNOSIS — I10 ESSENTIAL HYPERTENSION: ICD-10-CM

## 2017-08-04 DIAGNOSIS — N18.30 CKD STAGE 3 DUE TO TYPE 2 DIABETES MELLITUS (HCC): ICD-10-CM

## 2017-08-04 DIAGNOSIS — E11.43 TYPE 2 DIABETES MELLITUS WITH DIABETIC AUTONOMIC NEUROPATHY, WITH LONG-TERM CURRENT USE OF INSULIN (HCC): ICD-10-CM

## 2017-08-04 DIAGNOSIS — Z79.01 WARFARIN ANTICOAGULATION: Primary | ICD-10-CM

## 2017-08-04 DIAGNOSIS — E11.22 CKD STAGE 3 DUE TO TYPE 2 DIABETES MELLITUS (HCC): ICD-10-CM

## 2017-08-04 LAB
INR PPP: 2.8 (ref 2–3)
INR PPP: 2.8 (ref 2–3)

## 2017-08-04 PROCEDURE — 85610 PROTHROMBIN TIME: CPT | Performed by: INTERNAL MEDICINE

## 2017-08-04 PROCEDURE — 36416 COLLJ CAPILLARY BLOOD SPEC: CPT | Performed by: INTERNAL MEDICINE

## 2017-08-04 PROCEDURE — 99214 OFFICE O/P EST MOD 30 MIN: CPT | Performed by: INTERNAL MEDICINE

## 2017-08-04 RX ORDER — METAXALONE 400 MG/1
TABLET ORAL
Qty: 40 TABLET | Refills: 0 | Status: SHIPPED | OUTPATIENT
Start: 2017-08-04 | End: 2017-08-18

## 2017-08-04 NOTE — PROGRESS NOTES
Subjective     Joya Singh is a 77 y.o. female, who presents with a chief complaint of   Chief Complaint   Patient presents with   • Spasms     Lower back, started last Thursday when she was on vacation in [place]       HPI   The pt is here bc of muscle spasms for a week.  Her sx began when she was sitting in a chair and she twisted to pick something off the ground.  She has taken neurontin, norco, and flexeril with no relief.  She feels like her back is getting worse.  The pain is in the mid back.  No radiation.  No weakness, numbness, or tingling.  No bowel or bladder sx. She has tried heat with no help.      She is having a cyst in her back removed in a few days bc it got infected and is now some better.  She is seeing dr. Rob in the office.  She will be off her coumadin a few days before the procedure.     Diabetes mellitus - uncontrolled.  Last a1c 8.3  Pt reports that her glucoses have been in the low 100's lately.  She denies hypoglycemia.      hld - on statin  htn - well controlled  Warfarin use - INR was high on last check.  Pt denies excessive bleeding.    The following portions of the patient's history were reviewed and updated as appropriate: allergies, current medications, past family history, past medical history, past social history, past surgical history and problem list.    Allergies: Morphine and related; Baclofen; Codeine; Morphine; and Penicillins    Review of Systems   Constitutional: Negative.    HENT: Negative.    Eyes: Negative.    Respiratory: Negative.    Cardiovascular: Negative.    Gastrointestinal: Negative.    Endocrine: Negative.    Genitourinary: Negative.    Musculoskeletal: Positive for back pain.   Skin: Negative.    Allergic/Immunologic: Negative.    Neurological: Negative.    Hematological: Negative.    Psychiatric/Behavioral: Negative.    All other systems reviewed and are negative.      Objective     Wt Readings from Last 3 Encounters:   08/04/17 213 lb (96.6 kg)   08/01/17  215 lb (97.5 kg)   06/20/17 214 lb 3.2 oz (97.2 kg)     Temp Readings from Last 3 Encounters:   07/07/17 98 °F (36.7 °C) (Oral)   06/20/17 97.6 °F (36.4 °C) (Oral)   01/25/17 97.8 °F (36.6 °C)     BP Readings from Last 3 Encounters:   08/04/17 128/84   08/01/17 122/80   07/07/17 124/82     Pulse Readings from Last 3 Encounters:   08/04/17 86   07/07/17 67   06/20/17 94     Body mass index is 38.34 kg/(m^2).  SpO2 Readings from Last 3 Encounters:   08/04/17 93%   07/07/17 97%   06/20/17 98%       Physical Exam   Constitutional: She is oriented to person, place, and time. She appears well-developed and well-nourished. No distress.   HENT:   Head: Normocephalic and atraumatic.   Right Ear: External ear normal.   Left Ear: External ear normal.   Nose: Nose normal.   Mouth/Throat: Oropharynx is clear and moist.   Eyes: Conjunctivae and EOM are normal. Pupils are equal, round, and reactive to light.   Neck: Normal range of motion. Neck supple.   Cardiovascular: Normal rate, regular rhythm, normal heart sounds and intact distal pulses.    Pulmonary/Chest: Effort normal and breath sounds normal. No respiratory distress. She has no wheezes.   Musculoskeletal: Normal range of motion. She exhibits tenderness.   ttp of left mid back paraspinal muscles.  No spine TTP.  Gait at baseline - walks with cane   Neurological: She is alert and oriented to person, place, and time.   Skin: Skin is warm and dry.   Psychiatric: She has a normal mood and affect. Her behavior is normal. Judgment and thought content normal.   Nursing note and vitals reviewed.      Results for orders placed or performed in visit on 07/31/17   POC INR   Result Value Ref Range    INR 2.40 (A) 0.9 - 1.1       Assessment/Plan   Joya was seen today for spasms.    Diagnoses and all orders for this visit:    Back muscle spasm - d/c flexeril and try skelaxin.  Ok for norco prn.  gentle stretching.  No steroid pack for treatment bc of pt's uncontrolled DM and upcoming  cyst removal surgery.   -     metaxalone (SKELAXIN) 400 MG tablet; 1-2 tabs po q 12 hours    Type 2 diabetes mellitus with diabetic autonomic neuropathy, with long-term current use of insulin  -     Comprehensive Metabolic Panel; Future  -     CBC & Differential; Future  -     T4, Free; Future  -     TSH; Future  -     Lipid Panel With LDL / HDL Ratio; Future  -     Microalbumin / Creatinine Urine Ratio  -     Hemoglobin A1c; Future    Acquired hypothyroidism  -     Comprehensive Metabolic Panel; Future  -     T4, Free; Future  -     TSH; Future    Essential hypertension  -     Comprehensive Metabolic Panel; Future  -     CBC & Differential; Future  -     T4, Free; Future  -     TSH; Future  -     Lipid Panel With LDL / HDL Ratio; Future    CKD stage 3 due to type 2 diabetes mellitus  -     PTH, Intact; Future    Warfarin anticoagulation - INR therapeutic at 2.4    Abnormal blood chemistry level  -     Comprehensive Metabolic Panel; Future  -     PTH, Intact; Future    F/u 2-3 weeks for ov/labs.        Outpatient Medications Prior to Visit   Medication Sig Dispense Refill   • ACCU-CHEK FASTCLIX LANCETS misc 1 each 3 (three) times a day. TEST THREE TIMES DAILY TO FOUR TIMES DAILY AS DIRECTED 102 each 11   • acetaminophen (TYLENOL) 500 MG tablet Take by mouth.     • Alcohol Swabs (B-D SINGLE USE SWABS REGULAR) pads      • aspirin 325 MG tablet Take 325 mg by mouth Daily.     • atorvastatin (LIPITOR) 10 MG tablet Take one (1) tablet orally (by mouth) once daily 90 tablet 3   • Blood Glucose Monitoring Suppl (ACCU-CHEK KENNETH SMARTVIEW) W/DEVICE kit USE AS DIRECTED     • buPROPion SR (WELLBUTRIN SR) 150 MG 12 hr tablet Take 1 tablet by mouth Every Night.     • Calcium Carb-Cholecalciferol (CALCIUM PLUS VITAMIN D3 PO) Take by mouth.     • DULoxetine (CYMBALTA) 60 MG capsule Take 1 capsule by mouth Daily. 90 capsule 1   • furosemide (LASIX) 20 MG tablet Take one (1) tablet orally (by mouth) once daily 90 tablet 1   •  "gabapentin (NEURONTIN) 400 MG capsule Take 1 in am 1 afternoon and 2 at bedtime 120 capsule 3   • glucose blood (ACCU-CHEK SMARTVIEW) test strip Accu-Chek SmartView In Vitro Strip TEST THREE TIMES DAILY  TO FOUR TIMES DAILY AS DIRECTED 300 each 3   • HYDROcodone-acetaminophen (NORCO) 5-325 MG per tablet Take 1 tablet by mouth Every 12 (Twelve) Hours As Needed for Moderate Pain . 60 tablet 0   • insulin aspart (NOVOLOG FLEXPEN) 100 UNIT/ML solution pen-injector sc pen Inject 20 Units under the skin 3 (Three) Times a Day With Meals. 10 pen 1   • levothyroxine (SYNTHROID, LEVOTHROID) 75 MCG tablet Take 1 tablet by mouth Daily. 90 tablet 1   • lisinopril (PRINIVIL,ZESTRIL) 5 MG tablet Take one (1) tablet orally (by mouth) once daily 90 tablet 3   • metoprolol tartrate (LOPRESSOR) 25 MG tablet Take 1/2 tablet orally (by mouth) twice daily 90 tablet 1   • Needle, Disp, (BD DISP NEEDLES) 30G X 1/2\" misc To be used 3 times daily with Novolog Flexpen. 100 each 5   • omeprazole (priLOSEC) 40 MG capsule Take 1 capsule by mouth Daily. 90 capsule 1   • potassium chloride (K-DUR) 10 MEQ CR tablet Take one (1) tablet orally (by mouth) daily 90 tablet 2   • TRESIBA FLEXTOUCH 200 UNIT/ML solution pen-injector      • ULTICARE MICRO PEN NEEDLES 32G X 4 MM misc      • ULTICARE PEN NEEDLES 29G X 12MM misc      • warfarin (COUMADIN) 5 MG tablet Alternate 1 tablet by mouth one evening and then 1 1/2 tab by mouth the next evening 60 tablet 1   • cyclobenzaprine (FLEXERIL) 10 MG tablet Take 1 tablet by mouth 3 (Three) Times a Day As Needed for Muscle Spasms. 30 tablet 0     No facility-administered medications prior to visit.      New Medications Ordered This Visit   Medications   • metaxalone (SKELAXIN) 400 MG tablet     Si-2 tabs po q 12 hours     Dispense:  40 tablet     Refill:  0     Medications Discontinued During This Encounter   Medication Reason   • cyclobenzaprine (FLEXERIL) 10 MG tablet          Return for 2-3 weeks for " diabetes labs/check up..

## 2017-08-04 NOTE — TELEPHONE ENCOUNTER
"Worked in today with Dr. Mercado.    ----- Message from Beth Farley MA sent at 8/4/2017 12:08 PM EDT -----  Patient has used #30 of the Flexeril in 2 days.  States she has left-sided pain under her \"breast bone\" radiating to her back and feels swollen.  States this started about 2 weeks ago while sitting in a chair in [place] on vacation.  No fever or other symptoms, but states pain is excruciating.    ----- Message -----     From: Jessica Murphy     Sent: 8/4/2017  11:29 AM       To: Beth Farley MA    PATIENT NEEDS A MUSCLE RELAXER AT THIS POINT. SHE SAID SHE TOOK ALL OF HER CYCLOBENZATRINE 10 MG THAT DR DASILVA ORDERED FOR HER IT HAS NOT HELPED AT ALL.  SHE IS IN SO  MUCH PAIN.  [facility] IN [place].      [phone]      "

## 2017-08-08 ENCOUNTER — PROCEDURE VISIT (OUTPATIENT)
Dept: SURGERY | Facility: CLINIC | Age: 77
End: 2017-08-08

## 2017-08-08 VITALS
BODY MASS INDEX: 37.74 KG/M2 | DIASTOLIC BLOOD PRESSURE: 70 MMHG | SYSTOLIC BLOOD PRESSURE: 126 MMHG | WEIGHT: 213 LBS | HEIGHT: 63 IN

## 2017-08-08 DIAGNOSIS — L72.3 SEBACEOUS CYST: Primary | ICD-10-CM

## 2017-08-08 PROCEDURE — 11402 EXC TR-EXT B9+MARG 1.1-2 CM: CPT | Performed by: SURGERY

## 2017-08-08 NOTE — PROGRESS NOTES
EXC BACK CYST, PT IS W/O COMPLAINTS  She has been off her Coumadin for 3 days.  The site is unchanged.  The site was prepped and draped and locally infiltrate 1% lidocaine without epinephrine.  It was longitudinally elliptically excised to 1.3 cm.  It was consistent with a ruptured epidermal inclusion cyst.  She had a mild amount of bleeding.  Skin was closed in interrupted simple and vertical mattress fashion with use of 4-0 and 2-0 nylon.  Wound was cleaned and dried and sterilely dressed.  She can shower in 24 hours.  I will see her back in 2 weeks.  She can resume her Coumadin tomorrow.

## 2017-08-09 ENCOUNTER — RESULTS ENCOUNTER (OUTPATIENT)
Dept: INTERNAL MEDICINE | Facility: CLINIC | Age: 77
End: 2017-08-09

## 2017-08-09 DIAGNOSIS — E11.22 CKD STAGE 3 DUE TO TYPE 2 DIABETES MELLITUS (HCC): ICD-10-CM

## 2017-08-09 DIAGNOSIS — I10 ESSENTIAL HYPERTENSION: ICD-10-CM

## 2017-08-09 DIAGNOSIS — E11.43 TYPE 2 DIABETES MELLITUS WITH DIABETIC AUTONOMIC NEUROPATHY, WITH LONG-TERM CURRENT USE OF INSULIN (HCC): ICD-10-CM

## 2017-08-09 DIAGNOSIS — E03.9 ACQUIRED HYPOTHYROIDISM: ICD-10-CM

## 2017-08-09 DIAGNOSIS — Z79.4 TYPE 2 DIABETES MELLITUS WITH DIABETIC AUTONOMIC NEUROPATHY, WITH LONG-TERM CURRENT USE OF INSULIN (HCC): ICD-10-CM

## 2017-08-09 DIAGNOSIS — N18.30 CKD STAGE 3 DUE TO TYPE 2 DIABETES MELLITUS (HCC): ICD-10-CM

## 2017-08-09 DIAGNOSIS — R79.9 ABNORMAL BLOOD CHEMISTRY LEVEL: ICD-10-CM

## 2017-08-18 ENCOUNTER — OFFICE VISIT (OUTPATIENT)
Dept: INTERNAL MEDICINE | Facility: CLINIC | Age: 77
End: 2017-08-18

## 2017-08-18 VITALS
DIASTOLIC BLOOD PRESSURE: 80 MMHG | OXYGEN SATURATION: 98 % | SYSTOLIC BLOOD PRESSURE: 122 MMHG | BODY MASS INDEX: 37.67 KG/M2 | HEIGHT: 63 IN | HEART RATE: 79 BPM | WEIGHT: 212.6 LBS

## 2017-08-18 DIAGNOSIS — F33.1 MODERATE EPISODE OF RECURRENT MAJOR DEPRESSIVE DISORDER (HCC): Primary | ICD-10-CM

## 2017-08-18 DIAGNOSIS — E03.9 ACQUIRED HYPOTHYROIDISM: ICD-10-CM

## 2017-08-18 DIAGNOSIS — I82.4Y9 DEEP VEIN THROMBOSIS (DVT) OF PROXIMAL LOWER EXTREMITY, UNSPECIFIED CHRONICITY, UNSPECIFIED LATERALITY (HCC): ICD-10-CM

## 2017-08-18 DIAGNOSIS — E11.22 CKD STAGE 3 DUE TO TYPE 2 DIABETES MELLITUS (HCC): ICD-10-CM

## 2017-08-18 DIAGNOSIS — E78.2 MIXED HYPERLIPIDEMIA: ICD-10-CM

## 2017-08-18 DIAGNOSIS — Z79.4 TYPE 2 DIABETES MELLITUS WITH DIABETIC AUTONOMIC NEUROPATHY, WITH LONG-TERM CURRENT USE OF INSULIN (HCC): ICD-10-CM

## 2017-08-18 DIAGNOSIS — I25.10 CHRONIC CORONARY ARTERY DISEASE: ICD-10-CM

## 2017-08-18 DIAGNOSIS — I82.401 ACUTE DEEP VEIN THROMBOSIS (DVT) OF RIGHT LOWER EXTREMITY, UNSPECIFIED VEIN (HCC): ICD-10-CM

## 2017-08-18 DIAGNOSIS — I10 ESSENTIAL HYPERTENSION: ICD-10-CM

## 2017-08-18 DIAGNOSIS — Z79.01 WARFARIN ANTICOAGULATION: ICD-10-CM

## 2017-08-18 DIAGNOSIS — E11.43 TYPE 2 DIABETES MELLITUS WITH DIABETIC AUTONOMIC NEUROPATHY, WITH LONG-TERM CURRENT USE OF INSULIN (HCC): ICD-10-CM

## 2017-08-18 DIAGNOSIS — N18.30 CKD STAGE 3 DUE TO TYPE 2 DIABETES MELLITUS (HCC): ICD-10-CM

## 2017-08-18 DIAGNOSIS — K21.9 GASTROESOPHAGEAL REFLUX DISEASE, ESOPHAGITIS PRESENCE NOT SPECIFIED: ICD-10-CM

## 2017-08-18 LAB — INR PPP: 1.6 (ref 2–3)

## 2017-08-18 PROCEDURE — 85610 PROTHROMBIN TIME: CPT | Performed by: INTERNAL MEDICINE

## 2017-08-18 PROCEDURE — 99214 OFFICE O/P EST MOD 30 MIN: CPT | Performed by: INTERNAL MEDICINE

## 2017-08-18 PROCEDURE — 36416 COLLJ CAPILLARY BLOOD SPEC: CPT | Performed by: INTERNAL MEDICINE

## 2017-08-18 NOTE — PROGRESS NOTES
Subjective     Joya Singh is a 77 y.o. female, who presents with a chief complaint of   Chief Complaint   Patient presents with   • Follow-up     Needs labs   • Diabetes       HPI   The pt is here for follow up.  She has been struggling with her home situation and her son's significant others.  The pt wants to go back to [address] [place] [address] for counseling.  She told a neighbor yesterday she didn't want to live bc she was tired.  She says she would put a mask on her eyes and walk out in the road in front of a semi.  She isnt sleeping well.  She wants to restart her melatonin.  She contracts for safety.   She has a new [employer] nurse who will be coming to see her soon.     She has diabetes that has been difficult to control.  The pt says most of her sugars are in the 150-180 range in the morning.  No hypoglycemia.  Pt due for labs today.     Pt on warfarin and inr subtherapeutic today at 1.6.  Pt has long history of inconsistent dosing of her warfarin.     htn - well controlled. No ha/dizziness    hld - on statin.  No cramps/myalgias    The following portions of the patient's history were reviewed and updated as appropriate: allergies, current medications, past family history, past medical history, past social history, past surgical history and problem list.    Allergies: Morphine and related; Baclofen; Codeine; Morphine; and Penicillins    Review of Systems   Constitutional: Negative.    HENT: Negative.    Eyes: Negative.    Respiratory: Negative.    Cardiovascular: Negative.    Gastrointestinal: Negative.    Endocrine: Negative.    Genitourinary: Negative.    Musculoskeletal: Negative.    Skin: Negative.    Allergic/Immunologic: Negative.    Neurological: Negative.    Hematological: Negative.    Psychiatric/Behavioral: Positive for dysphoric mood.   All other systems reviewed and are negative.      Objective     Wt Readings from Last 3 Encounters:   08/18/17 212 lb 9.6 oz (96.4 kg)   08/08/17 213 lb (96.6 kg)   08/04/17 213 lb  (96.6 kg)     Temp Readings from Last 3 Encounters:   07/07/17 98 °F (36.7 °C) (Oral)   06/20/17 97.6 °F (36.4 °C) (Oral)   01/25/17 97.8 °F (36.6 °C)     BP Readings from Last 3 Encounters:   08/18/17 122/80   08/08/17 126/70   08/04/17 128/84     Pulse Readings from Last 3 Encounters:   08/18/17 79   08/04/17 86   07/07/17 67     Body mass index is 38.27 kg/(m^2).  SpO2 Readings from Last 3 Encounters:   08/18/17 98%   08/04/17 93%   07/07/17 97%       Physical Exam   Constitutional: She is oriented to person, place, and time. She appears well-developed and well-nourished. No distress.   HENT:   Head: Normocephalic and atraumatic.   Right Ear: External ear normal.   Left Ear: External ear normal.   Nose: Nose normal.   Mouth/Throat: Oropharynx is clear and moist.   Eyes: Conjunctivae and EOM are normal. Pupils are equal, round, and reactive to light.   Neck: Normal range of motion. Neck supple.   Cardiovascular: Normal rate, regular rhythm, normal heart sounds and intact distal pulses.    Pulmonary/Chest: Effort normal and breath sounds normal. No respiratory distress. She has no wheezes.   Musculoskeletal: Normal range of motion.   Normal gait   Neurological: She is alert and oriented to person, place, and time.   Skin: Skin is warm and dry.   Psychiatric: She has a normal mood and affect. Her behavior is normal. Judgment and thought content normal.   Nursing note and vitals reviewed.      Results for orders placed or performed in visit on 08/18/17   POCT INR   Result Value Ref Range    INR 1.6 (A) 2 - 3       Assessment/Plan   Joya was seen today for follow-up and diabetes.    Diagnoses and all orders for this visit:    Moderate episode of recurrent major depressive disorder - pt [place] crisis [place] ([place]  Pt contracts for safety.  She is on cymbalta and wellbutrin.     Acute deep vein thrombosis (DVT) of right lower extremity, unspecified vein - pt's inr subtherapeutic.  She has bene on  warfarin for years and had chronic issues with regulating inr.  Stop warfarin and try elequis 2.5mg bid.   -     POCT INR    Mixed hyperlipidemia - cont statin    Essential hypertension - cont bp meds    Chronic coronary artery disease - cont current meds.     Deep vein thrombosis (DVT) of proximal lower extremity, unspecified chronicity, unspecified laterality    Gastroesophageal reflux disease, esophagitis presence not specified- cont ppi    Type 2 diabetes mellitus with diabetic autonomic neuropathy, with long-term current use of insulin - check labs today    Acquired hypothyroidism - cont synthroid    CKD stage 3 due to type 2 diabetes mellitus    Warfarin anticoagulation - see above        Outpatient Medications Prior to Visit   Medication Sig Dispense Refill   • ACCU-CHEK FASTCLIX LANCETS misc 1 each 3 (three) times a day. TEST THREE TIMES DAILY TO FOUR TIMES DAILY AS DIRECTED 102 each 11   • acetaminophen (TYLENOL) 500 MG tablet Take by mouth.     • Alcohol Swabs (B-D SINGLE USE SWABS REGULAR) pads      • aspirin 325 MG tablet Take 325 mg by mouth Daily.     • atorvastatin (LIPITOR) 10 MG tablet Take one (1) tablet orally (by mouth) once daily 90 tablet 3   • Blood Glucose Monitoring Suppl (ACCU-CHEK KENNETH SMARTVIEW) W/DEVICE kit USE AS DIRECTED     • buPROPion SR (WELLBUTRIN SR) 150 MG 12 hr tablet Take 1 tablet by mouth Every Night.     • Calcium Carb-Cholecalciferol (CALCIUM PLUS VITAMIN D3 PO) Take by mouth.     • DULoxetine (CYMBALTA) 60 MG capsule Take 1 capsule by mouth Daily. 90 capsule 1   • furosemide (LASIX) 20 MG tablet Take one (1) tablet orally (by mouth) once daily 90 tablet 1   • gabapentin (NEURONTIN) 400 MG capsule Take 1 in am 1 afternoon and 2 at bedtime 120 capsule 3   • glucose blood (ACCU-CHEK SMARTVIEW) test strip Accu-Chek SmartView In Vitro Strip TEST THREE TIMES DAILY  TO FOUR TIMES DAILY AS DIRECTED 300 each 3   • HYDROcodone-acetaminophen (NORCO) 5-325 MG per tablet Take 1 tablet  "by mouth Every 12 (Twelve) Hours As Needed for Moderate Pain . 60 tablet 0   • insulin aspart (NOVOLOG FLEXPEN) 100 UNIT/ML solution pen-injector sc pen Inject 20 Units under the skin 3 (Three) Times a Day With Meals. 10 pen 1   • levothyroxine (SYNTHROID, LEVOTHROID) 75 MCG tablet Take 1 tablet by mouth Daily. 90 tablet 1   • lisinopril (PRINIVIL,ZESTRIL) 5 MG tablet Take one (1) tablet orally (by mouth) once daily 90 tablet 3   • metoprolol tartrate (LOPRESSOR) 25 MG tablet Take 1/2 tablet orally (by mouth) twice daily 90 tablet 1   • Needle, Disp, (BD DISP NEEDLES) 30G X 1/2\" misc To be used 3 times daily with Novolog Flexpen. 100 each 5   • omeprazole (priLOSEC) 40 MG capsule Take 1 capsule by mouth Daily. 90 capsule 1   • potassium chloride (K-DUR) 10 MEQ CR tablet Take one (1) tablet orally (by mouth) daily 90 tablet 2   • TRESIBA FLEXTOUCH 200 UNIT/ML solution pen-injector      • ULTICARE MICRO PEN NEEDLES 32G X 4 MM misc      • ULTICARE PEN NEEDLES 29G X 12MM misc      • metaxalone (SKELAXIN) 400 MG tablet 1-2 tabs po q 12 hours 40 tablet 0   • warfarin (COUMADIN) 5 MG tablet Alternate 1 tablet by mouth one evening and then 1 1/2 tab by mouth the next evening 60 tablet 1     No facility-administered medications prior to visit.      New Medications Ordered This Visit   Medications   • apixaban (ELIQUIS) 2.5 MG tablet tablet     Sig: Take 1 tablet by mouth 2 (Two) Times a Day.     Dispense:  60 tablet     Refill:  2     Medications Discontinued During This Encounter   Medication Reason   • metaxalone (SKELAXIN) 400 MG tablet    • warfarin (COUMADIN) 5 MG tablet          Return in about 4 weeks (around 9/15/2017) for Recheck.  "

## 2017-08-22 ENCOUNTER — TELEPHONE (OUTPATIENT)
Dept: INTERNAL MEDICINE | Facility: CLINIC | Age: 77
End: 2017-08-22

## 2017-08-22 ENCOUNTER — OFFICE VISIT (OUTPATIENT)
Dept: SURGERY | Facility: CLINIC | Age: 77
End: 2017-08-22

## 2017-08-22 DIAGNOSIS — Z09 SURGICAL FOLLOW-UP CARE: Primary | ICD-10-CM

## 2017-08-22 PROCEDURE — 99024 POSTOP FOLLOW-UP VISIT: CPT | Performed by: SURGERY

## 2017-08-22 NOTE — TELEPHONE ENCOUNTER
----- Message from Gaviota Bowles MA sent at 8/22/2017 10:23 AM EDT -----  PT IS UNABLE TO AFFORD ELIQUIS  GOING TO COST PT   30.00 PER MONTH AND WITH OTHER MEDS CAN'T AFFORD.  [phone]  DR REA      Left detailed message for pt, that  Dr rea wants her to stay on eliguis as she has not been therapeutic since she started on coumadin, she wants us to try to get patient assistance for pt

## 2017-08-22 NOTE — PROGRESS NOTES
2 wks s/p exc back cyst, pt is w/o complaints but does have a lesion on her L forearm that is of some concern  Her incision is healing well and she was without complaints regarding the excision site.  The sutures were removed today.  She showed me a lesion on her left forearm that is red elevated with pearly edges and irregular.  We will excise this next week we will hold her anticoagulation for 5 days prior to the excision.

## 2017-08-28 ENCOUNTER — PROCEDURE VISIT (OUTPATIENT)
Dept: SURGERY | Facility: CLINIC | Age: 77
End: 2017-08-28

## 2017-08-28 VITALS
WEIGHT: 212 LBS | DIASTOLIC BLOOD PRESSURE: 80 MMHG | HEIGHT: 63 IN | BODY MASS INDEX: 37.56 KG/M2 | SYSTOLIC BLOOD PRESSURE: 118 MMHG

## 2017-08-28 DIAGNOSIS — E11.43 TYPE 2 DIABETES MELLITUS WITH DIABETIC AUTONOMIC NEUROPATHY, WITH LONG-TERM CURRENT USE OF INSULIN (HCC): ICD-10-CM

## 2017-08-28 DIAGNOSIS — Z79.4 TYPE 2 DIABETES MELLITUS WITH DIABETIC AUTONOMIC NEUROPATHY, WITH LONG-TERM CURRENT USE OF INSULIN (HCC): ICD-10-CM

## 2017-08-28 DIAGNOSIS — E11.43 TYPE 2 DIABETES MELLITUS WITH AUTONOMIC NEUROPATHY (HCC): ICD-10-CM

## 2017-08-28 DIAGNOSIS — E10.9 BRITTLE DIABETES MELLITUS (HCC): ICD-10-CM

## 2017-08-28 DIAGNOSIS — L98.9 SKIN LESION OF LEFT ARM: Primary | ICD-10-CM

## 2017-08-28 PROCEDURE — 11602 EXC TR-EXT MAL+MARG 1.1-2 CM: CPT | Performed by: SURGERY

## 2017-08-28 RX ORDER — METAXALONE 400 MG/1
TABLET ORAL
Qty: 40 TABLET | Refills: 0 | Status: SHIPPED | OUTPATIENT
Start: 2017-08-28 | End: 2017-09-26

## 2017-08-28 RX ORDER — CYCLOBENZAPRINE HCL 10 MG
TABLET ORAL
Qty: 90 TABLET | Refills: 1 | Status: SHIPPED | OUTPATIENT
Start: 2017-08-28 | End: 2017-10-09 | Stop reason: SDUPTHER

## 2017-08-28 RX ORDER — INSULIN ASPART 100 [IU]/ML
INJECTION, SOLUTION INTRAVENOUS; SUBCUTANEOUS
Qty: 15 ML | Refills: 6 | Status: SHIPPED | OUTPATIENT
Start: 2017-08-28 | End: 2017-09-26 | Stop reason: SDUPTHER

## 2017-08-28 RX ORDER — POTASSIUM CHLORIDE 750 MG/1
TABLET, FILM COATED, EXTENDED RELEASE ORAL
Qty: 90 TABLET | Refills: 1 | Status: SHIPPED | OUTPATIENT
Start: 2017-08-28 | End: 2017-10-09 | Stop reason: SDUPTHER

## 2017-08-28 RX ORDER — LANCETS
EACH MISCELLANEOUS
Qty: 120 EACH | Refills: 10 | Status: SHIPPED | OUTPATIENT
Start: 2017-08-28 | End: 2018-09-29 | Stop reason: SDUPTHER

## 2017-08-28 NOTE — PROGRESS NOTES
exc L forearm lesion, pt is w/o complaints  Skin lesion now has a 1 mm satellite lesion.  The wound was prepped draped locally infiltrated with 1% lidocaine without epinephrine.  The lesion was longitudinally elliptically excised to 1.5 cm.  Pathology was sent.  Skin was closed in interrupted simple fashion with use of 4-0 nylon.  She tolerated the procedure well.  The site was sterilely dressed.  Wound care was discussed.  I will see her back in the office in 9 days.

## 2017-08-29 ENCOUNTER — TELEPHONE (OUTPATIENT)
Dept: INTERNAL MEDICINE | Facility: CLINIC | Age: 77
End: 2017-08-29

## 2017-08-29 NOTE — TELEPHONE ENCOUNTER
Returned patients call, she states that her home health nurse came around lunch time and showed her how to do it.  ----- Message from Jessica Murphy sent at 8/29/2017 10:41 AM EDT -----  PATIENT IS NOT SURE ABOUT THE NEW FLEX PEN AND NEEDS A CALL BACK ASAP TO WALK HER THROUGH THIS.  SHE ALREADY MISSED ONE DOSE BECAUSE SHE DON'T KNOW WHAT SHE IS DOING.    [phone]

## 2017-08-30 LAB — PATH REPORT.SITE OF ORIGIN SPEC: NORMAL

## 2017-09-01 ENCOUNTER — RESULTS ENCOUNTER (OUTPATIENT)
Dept: INTERNAL MEDICINE | Facility: CLINIC | Age: 77
End: 2017-09-01

## 2017-09-01 DIAGNOSIS — Z51.81 ENCOUNTER FOR MONITORING COUMADIN THERAPY: ICD-10-CM

## 2017-09-01 DIAGNOSIS — E03.9 HYPOTHYROIDISM, UNSPECIFIED TYPE: ICD-10-CM

## 2017-09-01 DIAGNOSIS — Z79.01 ENCOUNTER FOR MONITORING COUMADIN THERAPY: ICD-10-CM

## 2017-09-01 DIAGNOSIS — N18.9 CKD (CHRONIC KIDNEY DISEASE), UNSPECIFIED STAGE: ICD-10-CM

## 2017-09-01 DIAGNOSIS — I10 HYPERTENSION, ESSENTIAL: ICD-10-CM

## 2017-09-01 DIAGNOSIS — I82.419 DEEP VEIN THROMBOSIS (DVT) OF FEMORAL VEIN, UNSPECIFIED CHRONICITY, UNSPECIFIED LATERALITY (HCC): ICD-10-CM

## 2017-09-01 DIAGNOSIS — E78.2 HYPERLIPIDEMIA, MIXED: ICD-10-CM

## 2017-09-01 DIAGNOSIS — E11.69 TYPE 2 DIABETES MELLITUS WITH OTHER SPECIFIED COMPLICATION (HCC): ICD-10-CM

## 2017-09-01 DIAGNOSIS — G47.33 OSA (OBSTRUCTIVE SLEEP APNEA): ICD-10-CM

## 2017-09-06 ENCOUNTER — OFFICE VISIT (OUTPATIENT)
Dept: SURGERY | Facility: CLINIC | Age: 77
End: 2017-09-06

## 2017-09-06 DIAGNOSIS — Z09 SURGICAL FOLLOW-UP CARE: Primary | ICD-10-CM

## 2017-09-06 PROCEDURE — 99024 POSTOP FOLLOW-UP VISIT: CPT | Performed by: SURGERY

## 2017-09-06 NOTE — PROGRESS NOTES
1 wk 2 days s/p exc arm L lesion, pt is w/o complaints  She is without complaints.  Her incision is healing well.  Her sutures were removed.  Her pathology was consistent with basal cell carcinoma margins are negative.  Sutures were removed and Steri-Strips were applied I will see her back when necessary.

## 2017-09-08 ENCOUNTER — OFFICE VISIT (OUTPATIENT)
Dept: INTERNAL MEDICINE | Facility: CLINIC | Age: 77
End: 2017-09-08

## 2017-09-08 VITALS
HEIGHT: 63 IN | BODY MASS INDEX: 37.7 KG/M2 | OXYGEN SATURATION: 94 % | WEIGHT: 212.8 LBS | SYSTOLIC BLOOD PRESSURE: 124 MMHG | DIASTOLIC BLOOD PRESSURE: 80 MMHG | HEART RATE: 68 BPM

## 2017-09-08 DIAGNOSIS — E11.43 TYPE 2 DIABETES MELLITUS WITH DIABETIC AUTONOMIC NEUROPATHY, WITH LONG-TERM CURRENT USE OF INSULIN (HCC): ICD-10-CM

## 2017-09-08 DIAGNOSIS — R35.0 URINARY FREQUENCY: Primary | ICD-10-CM

## 2017-09-08 DIAGNOSIS — R82.998 LEUKOCYTES IN URINE: ICD-10-CM

## 2017-09-08 DIAGNOSIS — R30.0 DYSURIA: ICD-10-CM

## 2017-09-08 DIAGNOSIS — E03.9 ACQUIRED HYPOTHYROIDISM: ICD-10-CM

## 2017-09-08 DIAGNOSIS — Z79.4 TYPE 2 DIABETES MELLITUS WITH DIABETIC AUTONOMIC NEUROPATHY, WITH LONG-TERM CURRENT USE OF INSULIN (HCC): ICD-10-CM

## 2017-09-08 DIAGNOSIS — I10 ESSENTIAL HYPERTENSION: ICD-10-CM

## 2017-09-08 DIAGNOSIS — E78.2 MIXED HYPERLIPIDEMIA: ICD-10-CM

## 2017-09-08 DIAGNOSIS — I82.499 DEEP VEIN THROMBOSIS (DVT) OF OTHER VEIN OF LOWER EXTREMITY, UNSPECIFIED CHRONICITY, UNSPECIFIED LATERALITY (HCC): ICD-10-CM

## 2017-09-08 LAB
BILIRUB BLD-MCNC: NEGATIVE MG/DL
CLARITY, POC: ABNORMAL
COLOR UR: ABNORMAL
GLUCOSE UR STRIP-MCNC: NEGATIVE MG/DL
KETONES UR QL: NEGATIVE
LEUKOCYTE EST, POC: ABNORMAL
NITRITE UR-MCNC: POSITIVE MG/ML
PH UR: 6 [PH] (ref 5–8)
PROT UR STRIP-MCNC: ABNORMAL MG/DL
RBC # UR STRIP: NEGATIVE /UL
SP GR UR: 1.01 (ref 1–1.03)
UROBILINOGEN UR QL: NORMAL

## 2017-09-08 PROCEDURE — 81003 URINALYSIS AUTO W/O SCOPE: CPT | Performed by: INTERNAL MEDICINE

## 2017-09-08 PROCEDURE — 99214 OFFICE O/P EST MOD 30 MIN: CPT | Performed by: INTERNAL MEDICINE

## 2017-09-08 RX ORDER — CEPHALEXIN 500 MG/1
500 CAPSULE ORAL 2 TIMES DAILY
Qty: 14 CAPSULE | Refills: 0 | Status: SHIPPED | OUTPATIENT
Start: 2017-09-08 | End: 2017-09-26

## 2017-09-08 NOTE — PROGRESS NOTES
Subjective     Joya Singh is a 77 y.o. female, who presents with a chief complaint of   Chief Complaint   Patient presents with   • Urinary Tract Infection     X2days, started in the morning of Thursday, burning sensation while urinating and urinary frequency.        HPI   The pt has had dysuria for a couple of days.  No fever.  She feels bad. No nausea or vomiting.  + frequency and urgency.  No abd pain.     Uncontrolled dm - due for labs.  Glucoses continue to be labile. No hypoglycemia.     htn - fairly well controlled. No ha/dizziness.     Hx dvt - we switched from coumadin to eliquis bc of issues regulating inr.  She likes the new med better.     The following portions of the patient's history were reviewed and updated as appropriate: allergies, current medications, past family history, past medical history, past social history, past surgical history and problem list.    Allergies: Morphine and related; Baclofen; Codeine; Morphine; and Penicillins    Review of Systems   Constitutional: Positive for fatigue.   HENT: Negative.    Eyes: Negative.    Respiratory: Negative.    Cardiovascular: Negative.    Gastrointestinal: Negative.    Endocrine: Negative.    Genitourinary: Positive for dysuria and urgency.   Musculoskeletal: Negative.    Skin: Negative.    Allergic/Immunologic: Negative.    Neurological: Negative.    Hematological: Negative.    Psychiatric/Behavioral: Negative.    All other systems reviewed and are negative.      Objective     Wt Readings from Last 3 Encounters:   09/08/17 212 lb 12.8 oz (96.5 kg)   08/28/17 212 lb (96.2 kg)   08/18/17 212 lb 9.6 oz (96.4 kg)     Temp Readings from Last 3 Encounters:   07/07/17 98 °F (36.7 °C) (Oral)   06/20/17 97.6 °F (36.4 °C) (Oral)   01/25/17 97.8 °F (36.6 °C)     BP Readings from Last 3 Encounters:   09/08/17 124/80   08/28/17 118/80   08/18/17 122/80     Pulse Readings from Last 3 Encounters:   09/08/17 68   08/18/17 79   08/04/17 86     Body mass index is  38.3 kg/(m^2).  SpO2 Readings from Last 3 Encounters:   09/08/17 94%   08/18/17 98%   08/04/17 93%       Physical Exam   Constitutional: She is oriented to person, place, and time. She appears well-developed and well-nourished. No distress.   HENT:   Head: Normocephalic and atraumatic.   Right Ear: External ear normal.   Left Ear: External ear normal.   Nose: Nose normal.   Mouth/Throat: Oropharynx is clear and moist.   Eyes: Conjunctivae and EOM are normal. Pupils are equal, round, and reactive to light.   Neck: Normal range of motion. Neck supple.   Cardiovascular: Normal rate, regular rhythm, normal heart sounds and intact distal pulses.    Pulmonary/Chest: Effort normal and breath sounds normal. No respiratory distress. She has no wheezes.   Musculoskeletal: Normal range of motion.   Normal gait   Neurological: She is alert and oriented to person, place, and time.   Skin: Skin is warm and dry.   Psychiatric: She has a normal mood and affect. Her behavior is normal. Judgment and thought content normal.   Nursing note and vitals reviewed.      Results for orders placed or performed in visit on 09/08/17   POCT urinalysis dipstick, automated   Result Value Ref Range    Color Dark Yellow Yellow, Straw, Dark Yellow, Mehnaz    Clarity, UA Cloudy (A) Clear    Glucose, UA Negative Negative, 1000 mg/dL (3+) mg/dL    Bilirubin Negative Negative    Ketones, UA Negative Negative    Specific Gravity  1.015 1.005 - 1.030    Blood, UA Negative Negative    pH, Urine 6.0 5.0 - 8.0    Protein, POC Trace (A) Negative mg/dL    Urobilinogen, UA Normal Normal    Leukocytes Moderate (2+) (A) Negative    Nitrite, UA Positive (A) Negative       Assessment/Plan   Joya was seen today for urinary tract infection.    Diagnoses and all orders for this visit:    Urinary frequency  -     POCT urinalysis dipstick, automated  -     Urine culture (clean catch); Future  -     Urine culture (clean catch)    Leukocytes in urine  -     Urine culture  (clean catch); Future  -     Urine culture (clean catch)  -     Comprehensive Metabolic Panel; Future  -     CBC & Differential; Future  -     cephalexin (KEFLEX) 500 MG capsule; Take 1 capsule by mouth 2 (Two) Times a Day.    Deep vein thrombosis (DVT) of other vein of lower extremity, unspecified chronicity, unspecified laterality    Mixed hyperlipidemia  -     Comprehensive Metabolic Panel; Future  -     Lipid Panel With LDL / HDL Ratio; Future    Essential hypertension  -     Comprehensive Metabolic Panel; Future  -     CBC & Differential; Future  -     T4, Free; Future  -     TSH; Future  -     Lipid Panel With LDL / HDL Ratio; Future  -     Hemoglobin A1c; Future    Dysuria  -     cephalexin (KEFLEX) 500 MG capsule; Take 1 capsule by mouth 2 (Two) Times a Day.    Type 2 diabetes mellitus with diabetic autonomic neuropathy, with long-term current use of insulin  -     Comprehensive Metabolic Panel; Future  -     CBC & Differential; Future  -     T4, Free; Future  -     TSH; Future  -     Lipid Panel With LDL / HDL Ratio; Future  -     Hemoglobin A1c; Future    Acquired hypothyroidism  -     T4, Free; Future  -     TSH; Future          Outpatient Medications Prior to Visit   Medication Sig Dispense Refill   • ACCU-CHEK FASTCLIX LANCETS misc TEST 3-4 TIMES DAILY AS DIRECTED 120 each 10   • acetaminophen (TYLENOL) 500 MG tablet Take by mouth.     • Alcohol Swabs (B-D SINGLE USE SWABS REGULAR) pads      • apixaban (ELIQUIS) 2.5 MG tablet tablet Take 1 tablet by mouth 2 (Two) Times a Day. 60 tablet 2   • aspirin 325 MG tablet Take 325 mg by mouth Daily.     • atorvastatin (LIPITOR) 10 MG tablet Take one (1) tablet orally (by mouth) once daily 90 tablet 3   • Blood Glucose Monitoring Suppl (ACCU-CHEK KENNETH SMARTVIEW) W/DEVICE kit USE AS DIRECTED     • buPROPion SR (WELLBUTRIN SR) 150 MG 12 hr tablet Take 1 tablet by mouth Every Night.     • Calcium Carb-Cholecalciferol (CALCIUM PLUS VITAMIN D3 PO) Take by mouth.     •  "cyclobenzaprine (FLEXERIL) 10 MG tablet Take one (1) tablet orally (by mouth) three times daily as needed 90 tablet 1   • DULoxetine (CYMBALTA) 60 MG capsule Take 1 capsule by mouth Daily. 90 capsule 1   • furosemide (LASIX) 20 MG tablet Take one (1) tablet orally (by mouth) once daily 90 tablet 1   • gabapentin (NEURONTIN) 400 MG capsule Take 1 in am 1 afternoon and 2 at bedtime 120 capsule 3   • glucose blood (ACCU-CHEK SMARTVIEW) test strip Accu-Chek SmartView In Vitro Strip TEST THREE TIMES DAILY  TO FOUR TIMES DAILY AS DIRECTED 300 each 3   • HYDROcodone-acetaminophen (NORCO) 5-325 MG per tablet Take 1 tablet by mouth Every 12 (Twelve) Hours As Needed for Moderate Pain . 60 tablet 0   • levothyroxine (SYNTHROID, LEVOTHROID) 75 MCG tablet Take 1 tablet by mouth Daily. 90 tablet 1   • lisinopril (PRINIVIL,ZESTRIL) 5 MG tablet Take one (1) tablet orally (by mouth) once daily 90 tablet 3   • metaxalone (SKELAXIN) 400 MG tablet Take one (1) to two (2) tablets orally (by mouth) every 12 hours 40 tablet 0   • metoprolol tartrate (LOPRESSOR) 25 MG tablet Take 1/2 tablet orally (by mouth) twice daily 90 tablet 1   • Needle, Disp, (BD DISP NEEDLES) 30G X 1/2\" misc To be used 3 times daily with Novolog Flexpen. 100 each 5   • NOVOLOG FLEXPEN 100 UNIT/ML solution pen-injector sc pen Inject 20 UNITS subcutaneously three times daily with meals 15 mL 6   • omeprazole (priLOSEC) 40 MG capsule Take 1 capsule by mouth Daily. 90 capsule 1   • potassium chloride (K-DUR) 10 MEQ CR tablet Take one (1) tablet orally (by mouth) once daily 90 tablet 1   • TRESIBA FLEXTOUCH 200 UNIT/ML solution pen-injector      • ULTICARE MICRO PEN NEEDLES 32G X 4 MM misc      • ULTICARE PEN NEEDLES 29G X 12MM misc        No facility-administered medications prior to visit.      New Medications Ordered This Visit   Medications   • cephalexin (KEFLEX) 500 MG capsule     Sig: Take 1 capsule by mouth 2 (Two) Times a Day.     Dispense:  14 capsule     " Refill:  0   There are no discontinued medications.      Return for follow up at already scheduled appt on 9/19/17.

## 2017-09-13 ENCOUNTER — TELEPHONE (OUTPATIENT)
Dept: INTERNAL MEDICINE | Facility: CLINIC | Age: 77
End: 2017-09-13

## 2017-09-13 LAB
BACTERIA UR CULT: ABNORMAL
BACTERIA UR CULT: ABNORMAL
OTHER ANTIBIOTIC SUSC ISLT: ABNORMAL

## 2017-09-13 RX ORDER — NITROFURANTOIN 25; 75 MG/1; MG/1
100 CAPSULE ORAL 2 TIMES DAILY
Qty: 14 CAPSULE | Refills: 0 | Status: SHIPPED | OUTPATIENT
Start: 2017-09-13 | End: 2017-09-26

## 2017-09-13 NOTE — TELEPHONE ENCOUNTER
Patient notified, meds sent to pharmacy.  ----- Message from Chari Mercado MD sent at 9/13/2017  4:37 PM EDT -----  Call pt about labs.  Pt was on keflex.  Switch to macrobid 100mg bid x 7 days based on urine cx.

## 2017-09-19 ENCOUNTER — OFFICE VISIT (OUTPATIENT)
Dept: ORTHOPEDIC SURGERY | Facility: CLINIC | Age: 77
End: 2017-09-19

## 2017-09-19 DIAGNOSIS — M84.469A INSUFFICIENCY FRACTURE OF TIBIA, INITIAL ENCOUNTER: ICD-10-CM

## 2017-09-19 DIAGNOSIS — S83.232D COMPLEX TEAR OF MEDIAL MENISCUS OF LEFT KNEE AS CURRENT INJURY, SUBSEQUENT ENCOUNTER: Primary | ICD-10-CM

## 2017-09-19 LAB
ALBUMIN SERPL-MCNC: 3.9 G/DL (ref 3.5–5.2)
ALBUMIN/GLOB SERPL: 1.4 G/DL
ALP SERPL-CCNC: 137 U/L (ref 40–129)
ALT SERPL-CCNC: 9 U/L (ref 5–33)
AST SERPL-CCNC: 10 U/L (ref 5–32)
BASOPHILS # BLD AUTO: 0.08 10*3/MM3 (ref 0–0.2)
BASOPHILS NFR BLD AUTO: 1 % (ref 0–2)
BILIRUB SERPL-MCNC: 0.3 MG/DL (ref 0.2–1.2)
BUN SERPL-MCNC: 33 MG/DL (ref 8–23)
BUN/CREAT SERPL: 16.6 (ref 7–25)
CALCIUM SERPL-MCNC: 9.4 MG/DL (ref 8.8–10.5)
CHLORIDE SERPL-SCNC: 101 MMOL/L (ref 98–107)
CHOLEST SERPL-MCNC: 96 MG/DL (ref 0–200)
CO2 SERPL-SCNC: 28.6 MMOL/L (ref 22–29)
CREAT SERPL-MCNC: 1.99 MG/DL (ref 0.57–1)
EOSINOPHIL # BLD AUTO: 0.33 10*3/MM3 (ref 0.1–0.3)
EOSINOPHIL NFR BLD AUTO: 4.3 % (ref 0–4)
ERYTHROCYTE [DISTWIDTH] IN BLOOD BY AUTOMATED COUNT: 17.2 % (ref 11.5–14.5)
GLOBULIN SER CALC-MCNC: 2.8 GM/DL
GLUCOSE SERPL-MCNC: 140 MG/DL (ref 65–99)
HBA1C MFR BLD: 8.6 % (ref 4.8–5.6)
HCT VFR BLD AUTO: 31.1 % (ref 37–47)
HDLC SERPL-MCNC: 35 MG/DL (ref 40–60)
HGB BLD-MCNC: 9.6 G/DL (ref 12–16)
IMM GRANULOCYTES # BLD: 0.04 10*3/MM3 (ref 0–0.03)
IMM GRANULOCYTES NFR BLD: 0.5 % (ref 0–0.5)
LDLC SERPL CALC-MCNC: 38 MG/DL (ref 0–100)
LDLC/HDLC SERPL: 1.09 {RATIO}
LYMPHOCYTES # BLD AUTO: 3 10*3/MM3 (ref 0.6–4.8)
LYMPHOCYTES NFR BLD AUTO: 38.9 % (ref 20–45)
MCH RBC QN AUTO: 31.5 PG (ref 27–31)
MCHC RBC AUTO-ENTMCNC: 30.9 G/DL (ref 31–37)
MCV RBC AUTO: 102 FL (ref 81–99)
MONOCYTES # BLD AUTO: 0.35 10*3/MM3 (ref 0–1)
MONOCYTES NFR BLD AUTO: 4.5 % (ref 3–8)
NEUTROPHILS # BLD AUTO: 3.91 10*3/MM3 (ref 1.5–8.3)
NEUTROPHILS NFR BLD AUTO: 50.8 % (ref 45–70)
NRBC BLD AUTO-RTO: 0 /100 WBC (ref 0–0)
PLATELET # BLD AUTO: 392 10*3/MM3 (ref 140–500)
POTASSIUM SERPL-SCNC: 5.1 MMOL/L (ref 3.5–5.2)
PROT SERPL-MCNC: 6.7 G/DL (ref 6–8.5)
RBC # BLD AUTO: 3.05 10*6/MM3 (ref 4.2–5.4)
SODIUM SERPL-SCNC: 142 MMOL/L (ref 136–145)
T4 FREE SERPL-MCNC: 1.21 NG/DL (ref 0.93–1.7)
TRIGL SERPL-MCNC: 114 MG/DL (ref 0–150)
TSH SERPL DL<=0.005 MIU/L-ACNC: 3.74 MIU/ML (ref 0.27–4.2)
VLDLC SERPL CALC-MCNC: 22.8 MG/DL (ref 7–27)
WBC # BLD AUTO: 7.71 10*3/MM3 (ref 4.8–10.8)

## 2017-09-19 PROCEDURE — 99214 OFFICE O/P EST MOD 30 MIN: CPT | Performed by: ORTHOPAEDIC SURGERY

## 2017-09-19 NOTE — PROGRESS NOTES
Subjective:     Patient ID: Joya Singh is a 77 y.o. female.    Chief Complaint:  Follow-up left knee pain, meniscal tear, tibial subchondral insufficiency fracture  History of Present Illness  Joya Singh returns to clinic today for evaluation of left knee pain, states that pain has continued to worsen over the last 6-8 weeks, no significant improvement with intra-articular injections for more than 2-3 weeks, no improvement with anti-inflammatory medications.  Denies numbness or tingling left lower extremity, localizes majority of her pain to the medial proximal tibia with moderate tenderness along medial joint line, associated swelling noted, rates current level of pain as 8-9 out of 10, aching in nature with occasional sharp pain and catching noted.  Denies any locking of her knee, denies any left hip or groin pain.  Pain is exacerbated with prolonged walking, weightbearing, deep flexion, and rotational activities, minimal improvement with rest.      Social History     Occupational History   • Not on file.     Social History Main Topics   • Smoking status: Never Smoker   • Smokeless tobacco: Never Used   • Alcohol use No   • Drug use: No   • Sexual activity: Defer      Comment: EXERCISE - RARELY      Past Medical History:   Diagnosis Date   • Allergic rhinitis    • Anxiety    • Arthritis    • Bell's palsy    • Depression    • Diabetes mellitus    • Disease of thyroid gland    • H/O blood clots    • Hyperlipidemia    • Hypertension    • Kidney disease    • AARON (obstructive sleep apnea)    • Stroke    • TIA (transient ischemic attack)      Past Surgical History:   Procedure Laterality Date   • BACK SURGERY     • CHOLECYSTECTOMY     • COLONOSCOPY  2011    due for repeat in 2021   • HYSTERECTOMY     • SPINE SURGERY     • UPPER GASTROINTESTINAL ENDOSCOPY  2014    gastritis.  done by dr. hastings       Family History   Problem Relation Age of Onset   • Lupus Mother    • Heart disease Other    • Hypertension Other           Review of Systems   Constitutional: Negative for chills, diaphoresis, fever and unexpected weight change.   HENT: Negative for hearing loss, nosebleeds, sore throat and tinnitus.    Eyes: Negative for pain and visual disturbance.   Respiratory: Negative for cough, shortness of breath and wheezing.    Cardiovascular: Negative for chest pain and palpitations.   Gastrointestinal: Negative for abdominal pain, diarrhea, nausea and vomiting.   Endocrine: Negative for cold intolerance, heat intolerance and polydipsia.   Genitourinary: Negative for difficulty urinating, dysuria and hematuria.   Musculoskeletal: Positive for arthralgias, joint swelling and myalgias.   Skin: Negative for rash and wound.   Allergic/Immunologic: Negative for environmental allergies.   Neurological: Negative for dizziness, syncope and numbness.   Hematological: Does not bruise/bleed easily.   Psychiatric/Behavioral: Negative for dysphoric mood and sleep disturbance. The patient is not nervous/anxious.    All other systems reviewed and are negative.          Objective:  There were no vitals filed for this visit.  There were no vitals filed for this visit.  There is no height or weight on file to calculate BMI.  General: No acute distress.  Resp: normal respiratory effort  Skin: no rashes or wounds; normal turgor  Psych: mood and affect appropriate; recent and remote memory intact         Ortho Exam    Left knee- active range of motion 5-120°, 4+ out of 5 strength on flexion extension, moderate effusion noted.  Maximal tenderness palpation along medial proximal tibia, moderate tenderness along medial joint line, positive El exam with pain along medial joint line, no click.  Grade 1A Lachman, negative anterior posterior drawer, stable to varus and valgus stress at 5 and 30°.  No left hip pain [facility] logroll [facility],   Imaging:    Assessment:       1. Complex tear of medial meniscus of left knee as current injury, subsequent  encounter    2. Insufficiency fracture of tibia, initial encounter          Plan:  KENDRICK query complete.          Discussed treatment options at length with patient at today's visit. Reviewed findings from prior MRI as well as x-rays with patient at length, she does have evidence of early degenerative changes particularly medial compartment but her most profound findings on previous MRI include complex tear of medial meniscus as well as medial tibial plateau insufficiency fracture.  Given her localizes pain in these regions, we discussed options for total knee arthroplasty and option for arthroscopy with subchondroplasty.  I did discuss with her the limitations of arthroscopy as well as the treatment of the stress fracture, primarily emphasizing the fact that this may not completely relieve her pain and may not successfully improve her symptoms, but would be less invasive than a total knee arthroplasty.  Given her other medical issues she wanted to proceed with left knee arthroscopy and subchondroplasty. I reviewed details of procedure with patient today as well as a model of a knee and discussed risks, benefits, and alternatives of the procedure with the risks including but not limited to neurovascular damage, bleeding, infection, chronic pain, worsening of pain, chondrolysis, recurrent meniscal tear, swelling, loss of motion, weakness, stiffness, instability, DVT, pulmonary embolus, death, stroke, complex regional pain syndrome, and need for additional procedures.  Patient understood all these and had all questions answered today.  No guarantees were given regarding results of surgery.  We will have patient medically optimized if necessary prior to the time of surgery and plan on proceeding at next available date.     Patient does note prior history of DVT, she will continue her Eliquis postoperatively for DVT prophylaxis.    Joya Singh was in agreement with plan and had all questions answered.     Orders:  Orders  Placed This Encounter   Procedures   • Basic metabolic panel   • Protime-INR   • APTT   • Follow Anesthesia Guidelines / Standing Orders   • Provide instructions to patient regarding NPO status   • Clorhexidine skin prep       Medications:  No orders of the defined types were placed in this encounter.      Followup:  No Follow-up on file.          Dragon transcription disclaimer     Much of this encounter note is an electronic transcription/translation of spoken language to printed text. The electronic translation of spoken language may permit erroneous, or at times, nonsensical words or phrases to be inadvertently transcribed. Although I have reviewed the note for such errors, some may still exist.

## 2017-09-22 ENCOUNTER — CLINICAL SUPPORT (OUTPATIENT)
Dept: INTERNAL MEDICINE | Facility: CLINIC | Age: 77
End: 2017-09-22

## 2017-09-22 ENCOUNTER — HOSPITAL ENCOUNTER (OUTPATIENT)
Facility: HOSPITAL | Age: 77
Setting detail: HOSPITAL OUTPATIENT SURGERY
End: 2017-09-22
Attending: ORTHOPAEDIC SURGERY | Admitting: ORTHOPAEDIC SURGERY

## 2017-09-22 DIAGNOSIS — N39.0 URINARY TRACT INFECTION, SITE UNSPECIFIED: Primary | ICD-10-CM

## 2017-09-22 LAB
BILIRUB BLD-MCNC: NEGATIVE MG/DL
CLARITY, POC: CLEAR
COLOR UR: ABNORMAL
GLUCOSE UR STRIP-MCNC: NEGATIVE MG/DL
KETONES UR QL: NEGATIVE
LEUKOCYTE EST, POC: ABNORMAL
NITRITE UR-MCNC: NEGATIVE MG/ML
PH UR: 6 [PH] (ref 5–8)
PROT UR STRIP-MCNC: NEGATIVE MG/DL
RBC # UR STRIP: NEGATIVE /UL
SP GR UR: 1.01 (ref 1–1.03)
UROBILINOGEN UR QL: NORMAL

## 2017-09-22 PROCEDURE — 81003 URINALYSIS AUTO W/O SCOPE: CPT | Performed by: INTERNAL MEDICINE

## 2017-09-22 RX ORDER — PREGABALIN 25 MG/1
75 CAPSULE ORAL ONCE
Status: CANCELLED | OUTPATIENT
Start: 2017-09-22 | End: 2017-09-22

## 2017-09-22 RX ORDER — ACETAMINOPHEN 325 MG/1
1000 TABLET ORAL ONCE
Status: CANCELLED | OUTPATIENT
Start: 2017-09-22 | End: 2017-09-22

## 2017-09-24 LAB
BACTERIA UR CULT: NO GROWTH
BACTERIA UR CULT: NORMAL

## 2017-09-25 ENCOUNTER — TELEPHONE (OUTPATIENT)
Dept: INTERNAL MEDICINE | Facility: CLINIC | Age: 77
End: 2017-09-25

## 2017-09-25 NOTE — TELEPHONE ENCOUNTER
Patient scheduled for follow up tomorrow.     ----- Message from Chari Mercado MD sent at 9/25/2017  4:06 PM EDT -----  Call pt about labs.  Repeat urine cx neg

## 2017-09-26 ENCOUNTER — OFFICE VISIT (OUTPATIENT)
Dept: INTERNAL MEDICINE | Facility: CLINIC | Age: 77
End: 2017-09-26

## 2017-09-26 VITALS
HEART RATE: 73 BPM | OXYGEN SATURATION: 96 % | DIASTOLIC BLOOD PRESSURE: 70 MMHG | SYSTOLIC BLOOD PRESSURE: 122 MMHG | HEIGHT: 63 IN

## 2017-09-26 DIAGNOSIS — E03.9 ACQUIRED HYPOTHYROIDISM: ICD-10-CM

## 2017-09-26 DIAGNOSIS — I25.10 CHRONIC CORONARY ARTERY DISEASE: ICD-10-CM

## 2017-09-26 DIAGNOSIS — Z79.4 TYPE 2 DIABETES MELLITUS WITH DIABETIC AUTONOMIC NEUROPATHY, WITH LONG-TERM CURRENT USE OF INSULIN (HCC): ICD-10-CM

## 2017-09-26 DIAGNOSIS — G47.33 OBSTRUCTIVE SLEEP APNEA SYNDROME: ICD-10-CM

## 2017-09-26 DIAGNOSIS — K21.9 GASTROESOPHAGEAL REFLUX DISEASE, ESOPHAGITIS PRESENCE NOT SPECIFIED: ICD-10-CM

## 2017-09-26 DIAGNOSIS — E78.2 MIXED HYPERLIPIDEMIA: ICD-10-CM

## 2017-09-26 DIAGNOSIS — Z23 NEEDS FLU SHOT: Primary | ICD-10-CM

## 2017-09-26 DIAGNOSIS — E10.9 BRITTLE DIABETES MELLITUS (HCC): ICD-10-CM

## 2017-09-26 DIAGNOSIS — I10 ESSENTIAL HYPERTENSION: ICD-10-CM

## 2017-09-26 DIAGNOSIS — Z01.818 PREOPERATIVE EXAMINATION: ICD-10-CM

## 2017-09-26 DIAGNOSIS — E11.43 TYPE 2 DIABETES MELLITUS WITH DIABETIC AUTONOMIC NEUROPATHY, WITH LONG-TERM CURRENT USE OF INSULIN (HCC): ICD-10-CM

## 2017-09-26 LAB
ALBUMIN SERPL-MCNC: 3.9 G/DL (ref 3.5–5.2)
ALBUMIN/GLOB SERPL: 1.4 G/DL
ALP SERPL-CCNC: 142 U/L (ref 40–129)
ALT SERPL-CCNC: 8 U/L (ref 5–33)
AST SERPL-CCNC: 9 U/L (ref 5–32)
BASOPHILS # BLD AUTO: 0.09 10*3/MM3 (ref 0–0.2)
BASOPHILS NFR BLD AUTO: 1.3 % (ref 0–2)
BILIRUB SERPL-MCNC: 0.3 MG/DL (ref 0.2–1.2)
BUN SERPL-MCNC: 29 MG/DL (ref 8–23)
BUN/CREAT SERPL: 14.4 (ref 7–25)
CALCIUM SERPL-MCNC: 9.5 MG/DL (ref 8.8–10.5)
CHLORIDE SERPL-SCNC: 106 MMOL/L (ref 98–107)
CO2 SERPL-SCNC: 27.3 MMOL/L (ref 22–29)
CREAT SERPL-MCNC: 2.01 MG/DL (ref 0.57–1)
EOSINOPHIL # BLD AUTO: 0.46 10*3/MM3 (ref 0.1–0.3)
EOSINOPHIL NFR BLD AUTO: 6.8 % (ref 0–4)
ERYTHROCYTE [DISTWIDTH] IN BLOOD BY AUTOMATED COUNT: 17.8 % (ref 11.5–14.5)
GLOBULIN SER CALC-MCNC: 2.7 GM/DL
GLUCOSE SERPL-MCNC: 84 MG/DL (ref 65–99)
HCT VFR BLD AUTO: 30.3 % (ref 37–47)
HGB BLD-MCNC: 9.8 G/DL (ref 12–16)
IMM GRANULOCYTES # BLD: 0.04 10*3/MM3 (ref 0–0.03)
IMM GRANULOCYTES NFR BLD: 0.6 % (ref 0–0.5)
LYMPHOCYTES # BLD AUTO: 2.39 10*3/MM3 (ref 0.6–4.8)
LYMPHOCYTES NFR BLD AUTO: 35.6 % (ref 20–45)
MCH RBC QN AUTO: 32.8 PG (ref 27–31)
MCHC RBC AUTO-ENTMCNC: 32.3 G/DL (ref 31–37)
MCV RBC AUTO: 101.3 FL (ref 81–99)
MONOCYTES # BLD AUTO: 0.3 10*3/MM3 (ref 0–1)
MONOCYTES NFR BLD AUTO: 4.5 % (ref 3–8)
NEUTROPHILS # BLD AUTO: 3.44 10*3/MM3 (ref 1.5–8.3)
NEUTROPHILS NFR BLD AUTO: 51.2 % (ref 45–70)
NRBC BLD AUTO-RTO: 0 /100 WBC (ref 0–0)
PLATELET # BLD AUTO: 360 10*3/MM3 (ref 140–500)
POTASSIUM SERPL-SCNC: 4.9 MMOL/L (ref 3.5–5.2)
PROT SERPL-MCNC: 6.6 G/DL (ref 6–8.5)
RBC # BLD AUTO: 2.99 10*6/MM3 (ref 4.2–5.4)
SODIUM SERPL-SCNC: 144 MMOL/L (ref 136–145)
WBC # BLD AUTO: 6.72 10*3/MM3 (ref 4.8–10.8)

## 2017-09-26 PROCEDURE — 93000 ELECTROCARDIOGRAM COMPLETE: CPT | Performed by: INTERNAL MEDICINE

## 2017-09-26 PROCEDURE — G0008 ADMIN INFLUENZA VIRUS VAC: HCPCS | Performed by: INTERNAL MEDICINE

## 2017-09-26 PROCEDURE — 99214 OFFICE O/P EST MOD 30 MIN: CPT | Performed by: INTERNAL MEDICINE

## 2017-09-26 NOTE — PROGRESS NOTES
Subjective     Joya Singh is a 77 y.o. female, who presents with a chief complaint of   Chief Complaint   Patient presents with   • Follow-up     Had labs last week.       HPI   The pt is here for follow up.  She saw dr. Wylie and she has a torn meniscus on top of arthritis in her left knee.  She is supposed to have surgery in October (10/18).   She was having chest pains at her last visit but says this has resolved.  She had a heart cath about 15 years ago.  She had mild cad at that time.  She has an abnormal ecg at baseline.  She still has episodes where she gets light headed.  Sometimes she has palpitations but this comes and goes.  No LE edema.      DM - uncontrolled.  Pt taking 80 units of tresiba daily and novolog 20 units tid.  She says her lowest sugars are in the morning (120's) and highest before dinner (268 last night).  She hasnt been very active lately bc of her knee pain.    htn - well controlled.  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      HLD - on statin.  Well controlled.  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      cindi - pt not wearing her cpap regularly.    Chronic anemia - no recent bleeding    The following portions of the patient's history were reviewed and updated as appropriate: allergies, current medications, past family history, past medical history, past social history, past surgical history and problem list.    Allergies: Morphine and related; Baclofen; Codeine; Morphine; and Penicillins    Review of Systems   Constitutional: Negative.    HENT: Negative.    Eyes: Negative.    Respiratory: Negative.    Cardiovascular: Negative.    Gastrointestinal: Negative.    Endocrine: Negative.    Genitourinary: Negative.    Musculoskeletal: Negative.    Skin: Negative.    Allergic/Immunologic: Negative.    Neurological: Negative.    Hematological: Negative.    Psychiatric/Behavioral: Negative.    All other systems reviewed and are  negative.      Objective     Wt Readings from Last 3 Encounters:   09/08/17 212 lb 12.8 oz (96.5 kg)   08/28/17 212 lb (96.2 kg)   08/18/17 212 lb 9.6 oz (96.4 kg)     Temp Readings from Last 3 Encounters:   07/07/17 98 °F (36.7 °C) (Oral)   06/20/17 97.6 °F (36.4 °C) (Oral)   01/25/17 97.8 °F (36.6 °C)     BP Readings from Last 3 Encounters:   09/26/17 122/70   09/08/17 124/80   08/28/17 118/80     Pulse Readings from Last 3 Encounters:   09/26/17 73   09/08/17 68   08/18/17 79     There is no height or weight on file to calculate BMI.  SpO2 Readings from Last 3 Encounters:   09/26/17 96%   09/08/17 94%   08/18/17 98%       Physical Exam   Constitutional: She is oriented to person, place, and time. She appears well-developed and well-nourished. No distress.   HENT:   Head: Normocephalic and atraumatic.   Right Ear: External ear normal.   Left Ear: External ear normal.   Nose: Nose normal.   Mouth/Throat: Oropharynx is clear and moist.   Eyes: Conjunctivae and EOM are normal. Pupils are equal, round, and reactive to light.   Neck: Normal range of motion. Neck supple.   Cardiovascular: Normal rate, regular rhythm, normal heart sounds and intact distal pulses.    Pulmonary/Chest: Effort normal and breath sounds normal. No respiratory distress. She has no wheezes.   Musculoskeletal: Normal range of motion.   Normal gait   Neurological: She is alert and oriented to person, place, and time.   Skin: Skin is warm and dry.   Psychiatric: She has a normal mood and affect. Her behavior is normal. Judgment and thought content normal.   Nursing note and vitals reviewed.      9/19/17 labs reviewed    a1c 8.6    Thyroid tests normal    Creatinine - 1.99    Results for orders placed or performed in visit on 09/26/17   Comprehensive Metabolic Panel   Result Value Ref Range    Glucose 84 65 - 99 mg/dL    BUN 29 (H) 8 - 23 mg/dL    Creatinine 2.01 (H) 0.57 - 1.00 mg/dL    eGFR Non African Am 24 (L) >60 mL/min/1.73    eGFR African Am  29 (L) >60 mL/min/1.73    BUN/Creatinine Ratio 14.4 7.0 - 25.0    Sodium 144 136 - 145 mmol/L    Potassium 4.9 3.5 - 5.2 mmol/L    Chloride 106 98 - 107 mmol/L    Total CO2 27.3 22.0 - 29.0 mmol/L    Calcium 9.5 8.8 - 10.5 mg/dL    Total Protein 6.6 6.0 - 8.5 g/dL    Albumin 3.90 3.50 - 5.20 g/dL    Globulin 2.7 gm/dL    A/G Ratio 1.4 g/dL    Total Bilirubin 0.3 0.2 - 1.2 mg/dL    Alkaline Phosphatase 142 (H) 40 - 129 U/L    AST (SGOT) 9 5 - 32 U/L    ALT (SGPT) 8 5 - 33 U/L   CBC & Differential   Result Value Ref Range    WBC 6.72 4.80 - 10.80 10*3/mm3    RBC 2.99 (L) 4.20 - 5.40 10*6/mm3    Hemoglobin 9.8 (L) 12.0 - 16.0 g/dL    Hematocrit 30.3 (L) 37.0 - 47.0 %    .3 (H) 81.0 - 99.0 fL    MCH 32.8 (H) 27.0 - 31.0 pg    MCHC 32.3 31.0 - 37.0 g/dL    RDW 17.8 (H) 11.5 - 14.5 %    Platelets 360 140 - 500 10*3/mm3    Neutrophil Rel % 51.2 45.0 - 70.0 %    Lymphocyte Rel % 35.6 20.0 - 45.0 %    Monocyte Rel % 4.5 3.0 - 8.0 %    Eosinophil Rel % 6.8 (H) 0.0 - 4.0 %    Basophil Rel % 1.3 0.0 - 2.0 %    Neutrophils Absolute 3.44 1.50 - 8.30 10*3/mm3    Lymphocytes Absolute 2.39 0.60 - 4.80 10*3/mm3    Monocytes Absolute 0.30 0.00 - 1.00 10*3/mm3    Eosinophils Absolute 0.46 (H) 0.10 - 0.30 10*3/mm3    Basophils Absolute 0.09 0.00 - 0.20 10*3/mm3    Immature Granulocyte Rel % 0.6 (H) 0.0 - 0.5 %    Immature Grans Absolute 0.04 (H) 0.00 - 0.03 10*3/mm3    nRBC 0.0 0.0 - 0.0 /100 WBC       ECG 12 Lead  Date/Time: 9/26/2017 10:05 AM  Performed by: MARIJA REA  Authorized by: MARIJA REA   Comparison: compared with previous ECG from 1/25/2017  Similar to previous ECG  Rhythm comments: sinus arrhythmia  Rate: normal  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  QRS axis: left  Clinical impression: abnormal ECG          Assessment/Plan   Joya was seen today for follow-up.    Diagnoses and all orders for this visit:    Chronic  coronary artery disease - pt on asa, eliquis, lipitor, lasix, and lisinopril  -     Ambulatory Referral to Cardiology  -     ECG 12 Lead; Future  -     Adult Transthoracic Echo Complete W/ Cont if Necessary Per Protocol; Future  -     Stress Test With Myocardial Perfusion One Day; Future    Mixed hyperlipidemia - cont statin  -     Ambulatory Referral to Cardiology  -     ECG 12 Lead; Future  -     Adult Transthoracic Echo Complete W/ Cont if Necessary Per Protocol; Future  -     Stress Test With Myocardial Perfusion One Day; Future    Essential hypertension - well controlled on multi-drug regimen  -     Ambulatory Referral to Cardiology  -     ECG 12 Lead; Future  -     Adult Transthoracic Echo Complete W/ Cont if Necessary Per Protocol; Future  -     Stress Test With Myocardial Perfusion One Day; Future    Type 2 diabetes mellitus with diabetic autonomic neuropathy, with long-term current use of insulin - uncontrolled.  Needs much better control to have optimal outcomes with surgery.  increase novolog as below.  Cont tresiba at night  -     Ambulatory Referral to Cardiology  -     Stress Test With Myocardial Perfusion One Day; Future  -     insulin aspart (NOVOLOG FLEXPEN) 100 UNIT/ML solution pen-injector sc pen; 20 units sq before breakfast then 25 units sq before lunch and dinner    Acquired hypothyroidism - cont synthroid  -     Ambulatory Referral to Cardiology    Obstructive sleep apnea syndrome - encouraged pt to wear cpap nightly  -     Ambulatory Referral to Cardiology    Gastroesophageal reflux disease, esophagitis presence not specified - cont PPI    Preoperative examination  -     Ambulatory Referral to Cardiology  -     ECG 12 Lead; Future  -     Adult Transthoracic Echo Complete W/ Cont if Necessary Per Protocol; Future    [facility] and cymbalta but still with sad mood. She has an appt later this week with psych to review her meds.       Outpatient  "Medications Prior to Visit   Medication Sig Dispense Refill   • ACCU-CHEK FASTCLIX LANCETS misc TEST 3-4 TIMES DAILY AS DIRECTED 120 each 10   • acetaminophen (TYLENOL) 500 MG tablet Take by mouth.     • Alcohol Swabs (B-D SINGLE USE SWABS REGULAR) pads      • apixaban (ELIQUIS) 2.5 MG tablet tablet Take 1 tablet by mouth 2 (Two) Times a Day. 60 tablet 2   • aspirin 325 MG tablet Take 325 mg by mouth Daily.     • atorvastatin (LIPITOR) 10 MG tablet Take one (1) tablet orally (by mouth) once daily 90 tablet 3   • Blood Glucose Monitoring Suppl (ACCU-CHEK KENNETH SMARTVIEW) W/DEVICE kit USE AS DIRECTED     • buPROPion SR (WELLBUTRIN SR) 150 MG 12 hr tablet Take 1 tablet by mouth Every Night.     • Calcium Carb-Cholecalciferol (CALCIUM PLUS VITAMIN D3 PO) Take by mouth.     • cyclobenzaprine (FLEXERIL) 10 MG tablet Take one (1) tablet orally (by mouth) three times daily as needed 90 tablet 1   • DULoxetine (CYMBALTA) 60 MG capsule Take 1 capsule by mouth Daily. 90 capsule 1   • furosemide (LASIX) 20 MG tablet Take one (1) tablet orally (by mouth) once daily 90 tablet 1   • gabapentin (NEURONTIN) 400 MG capsule Take 1 in am 1 afternoon and 2 at bedtime 120 capsule 3   • glucose blood (ACCU-CHEK SMARTVIEW) test strip Accu-Chek SmartView In Vitro Strip TEST THREE TIMES DAILY  TO FOUR TIMES DAILY AS DIRECTED 300 each 3   • HYDROcodone-acetaminophen (NORCO) 5-325 MG per tablet Take 1 tablet by mouth Every 12 (Twelve) Hours As Needed for Moderate Pain . 60 tablet 0   • levothyroxine (SYNTHROID, LEVOTHROID) 75 MCG tablet Take 1 tablet by mouth Daily. 90 tablet 1   • lisinopril (PRINIVIL,ZESTRIL) 5 MG tablet Take one (1) tablet orally (by mouth) once daily 90 tablet 3   • metoprolol tartrate (LOPRESSOR) 25 MG tablet Take 1/2 tablet orally (by mouth) twice daily 90 tablet 1   • Needle, Disp, (BD DISP NEEDLES) 30G X 1/2\" misc To be used 3 times daily with Novolog Flexpen. 100 each 5   • omeprazole (priLOSEC) 40 MG capsule Take 1 " capsule by mouth Daily. 90 capsule 1   • potassium chloride (K-DUR) 10 MEQ CR tablet Take one (1) tablet orally (by mouth) once daily 90 tablet 1   • TRESIBA FLEXTOUCH 200 UNIT/ML solution pen-injector      • ULTICARE MICRO PEN NEEDLES 32G X 4 MM misc      • ULTICARE PEN NEEDLES 29G X 12MM misc      • cephalexin (KEFLEX) 500 MG capsule Take 1 capsule by mouth 2 (Two) Times a Day. 14 capsule 0   • metaxalone (SKELAXIN) 400 MG tablet Take one (1) to two (2) tablets orally (by mouth) every 12 hours 40 tablet 0   • nitrofurantoin, macrocrystal-monohydrate, (MACROBID) 100 MG capsule Take 1 capsule by mouth 2 (Two) Times a Day. 14 capsule 0   • NOVOLOG FLEXPEN 100 UNIT/ML solution pen-injector sc pen Inject 20 UNITS subcutaneously three times daily with meals 15 mL 6     No facility-administered medications prior to visit.      New Medications Ordered This Visit   Medications   • insulin aspart (NOVOLOG FLEXPEN) 100 UNIT/ML solution pen-injector sc pen     Si units sq before breakfast then 25 units sq before lunch and dinner     Dispense:  15 mL     Refill:  6       Medications Discontinued During This Encounter   Medication Reason   • NOVOLOG FLEXPEN 100 UNIT/ML solution pen-injector sc pen Reorder   • metaxalone (SKELAXIN) 400 MG tablet    • nitrofurantoin, macrocrystal-monohydrate, (MACROBID) 100 MG capsule    • cephalexin (KEFLEX) 500 MG capsule          Return in about 2 weeks (around 10/10/2017) for Recheck.

## 2017-09-27 ENCOUNTER — TELEPHONE (OUTPATIENT)
Dept: INTERNAL MEDICINE | Facility: CLINIC | Age: 77
End: 2017-09-27

## 2017-09-27 NOTE — TELEPHONE ENCOUNTER
Patient has been advised and voiced understanding. Lab re check scheduled 10/4/17.     ----- Message from Chari Mercado MD sent at 9/26/2017  4:37 PM EDT -----  Call pt about labs.  Kidney function still up.  Hold lisinopril.  Is pt taking lasix daily?  If taking daily switch lasix to every other day.  Repeat bmp in 1 week

## 2017-09-28 DIAGNOSIS — N28.9 ABNORMAL KIDNEY FUNCTION: Primary | ICD-10-CM

## 2017-09-29 DIAGNOSIS — E03.9 ACQUIRED HYPOTHYROIDISM: ICD-10-CM

## 2017-09-29 RX ORDER — LEVOTHYROXINE SODIUM 0.07 MG/1
TABLET ORAL
Qty: 90 TABLET | Refills: 1 | Status: SHIPPED | OUTPATIENT
Start: 2017-09-29 | End: 2017-10-09 | Stop reason: SDUPTHER

## 2017-10-03 ENCOUNTER — RESULTS ENCOUNTER (OUTPATIENT)
Dept: INTERNAL MEDICINE | Facility: CLINIC | Age: 77
End: 2017-10-03

## 2017-10-03 DIAGNOSIS — N28.9 ABNORMAL KIDNEY FUNCTION: ICD-10-CM

## 2017-10-04 ENCOUNTER — TELEPHONE (OUTPATIENT)
Dept: INTERNAL MEDICINE | Facility: CLINIC | Age: 77
End: 2017-10-04

## 2017-10-04 DIAGNOSIS — S83.232A COMPLEX TEAR OF MEDIAL MENISCUS OF LEFT KNEE AS CURRENT INJURY, INITIAL ENCOUNTER: ICD-10-CM

## 2017-10-04 LAB
BUN SERPL-MCNC: 21 MG/DL (ref 8–23)
BUN/CREAT SERPL: 10.9 (ref 7–25)
CALCIUM SERPL-MCNC: 9.2 MG/DL (ref 8.8–10.5)
CHLORIDE SERPL-SCNC: 106 MMOL/L (ref 98–107)
CO2 SERPL-SCNC: 25.9 MMOL/L (ref 22–29)
CREAT SERPL-MCNC: 1.92 MG/DL (ref 0.57–1)
GLUCOSE SERPL-MCNC: 92 MG/DL (ref 65–99)
POTASSIUM SERPL-SCNC: 5 MMOL/L (ref 3.5–5.2)
SODIUM SERPL-SCNC: 142 MMOL/L (ref 136–145)

## 2017-10-04 RX ORDER — HYDROCODONE BITARTRATE AND ACETAMINOPHEN 5; 325 MG/1; MG/1
1 TABLET ORAL EVERY 12 HOURS PRN
Qty: 60 TABLET | Refills: 0 | Status: SHIPPED | OUTPATIENT
Start: 2017-10-04 | End: 2018-03-16

## 2017-10-04 NOTE — TELEPHONE ENCOUNTER
Patient was in today for her labs, per Dr. Burnham, no diabetes medication until after her heart cath, she may take half of her long acting insulin tonight. Hydrocodone refilled, sig is one tablet every 12 hours, prn. Patient notified while in office, RX given. Reviewed results with patient, she understands.   ----- Message from Gaviota Bowles MA sent at 10/3/2017  4:29 PM EDT -----  I put this in dr burnham's folder, I have not set up to print because is didn't know if she wanted to change or leave as is   thanks  ----- Message -----     From: Gaviota Bowles MA     Sent: 10/3/2017   4:27 PM       To: Gaviota Bowles MA    Pt is wanting refill on her hydrocodone 5/325mg  But she wants to go back to  Every 8 hours  Instead of every 12 hours  Prn  She is having a lot of pain due to her knee and she is going to be having knee surgery      lov   9/26/17  narc and hiral

## 2017-10-05 ENCOUNTER — HOSPITAL ENCOUNTER (OUTPATIENT)
Dept: CARDIOLOGY | Facility: HOSPITAL | Age: 77
Discharge: HOME | End: 2017-10-05
Attending: INTERNAL MEDICINE

## 2017-10-05 ENCOUNTER — HOSPITAL ENCOUNTER (OUTPATIENT)
Dept: NUCLEAR MEDICINE | Facility: HOSPITAL | Age: 77
Discharge: HOME | End: 2017-10-05
Attending: INTERNAL MEDICINE

## 2017-10-05 VITALS
HEART RATE: 78 BPM | OXYGEN SATURATION: 94 % | RESPIRATION RATE: 18 BRPM | HEIGHT: 62 IN | DIASTOLIC BLOOD PRESSURE: 73 MMHG | SYSTOLIC BLOOD PRESSURE: 150 MMHG | BODY MASS INDEX: 39.01 KG/M2 | WEIGHT: 212 LBS

## 2017-10-05 VITALS
BODY MASS INDEX: 37.56 KG/M2 | HEART RATE: 89 BPM | DIASTOLIC BLOOD PRESSURE: 89 MMHG | SYSTOLIC BLOOD PRESSURE: 146 MMHG | WEIGHT: 212 LBS | HEIGHT: 63 IN

## 2017-10-05 DIAGNOSIS — E11.43 TYPE 2 DIABETES MELLITUS WITH DIABETIC AUTONOMIC NEUROPATHY, WITH LONG-TERM CURRENT USE OF INSULIN (HCC): ICD-10-CM

## 2017-10-05 DIAGNOSIS — Z01.818 PREOPERATIVE EXAMINATION: ICD-10-CM

## 2017-10-05 DIAGNOSIS — I25.10 CHRONIC CORONARY ARTERY DISEASE: ICD-10-CM

## 2017-10-05 DIAGNOSIS — Z79.4 TYPE 2 DIABETES MELLITUS WITH DIABETIC AUTONOMIC NEUROPATHY, WITH LONG-TERM CURRENT USE OF INSULIN (HCC): ICD-10-CM

## 2017-10-05 DIAGNOSIS — E78.2 MIXED HYPERLIPIDEMIA: ICD-10-CM

## 2017-10-05 DIAGNOSIS — I10 ESSENTIAL HYPERTENSION: ICD-10-CM

## 2017-10-05 LAB
AORTIC ARCH: 2.3 CM
ASCENDING AORTA: 3.7 CM
BH CV ECHO MEAS - ACS: 1.7 CM
BH CV ECHO MEAS - AO MAX PG (FULL): 5.2 MMHG
BH CV ECHO MEAS - AO MAX PG: 12.7 MMHG
BH CV ECHO MEAS - AO MEAN PG (FULL): 3 MMHG
BH CV ECHO MEAS - AO MEAN PG: 7 MMHG
BH CV ECHO MEAS - AO ROOT AREA (BSA CORRECTED): 1.4
BH CV ECHO MEAS - AO ROOT AREA: 6.2 CM^2
BH CV ECHO MEAS - AO ROOT DIAM: 2.8 CM
BH CV ECHO MEAS - AO V2 MAX: 178 CM/SEC
BH CV ECHO MEAS - AO V2 MEAN: 127 CM/SEC
BH CV ECHO MEAS - AO V2 VTI: 40.6 CM
BH CV ECHO MEAS - ASC AORTA: 3.7 CM
BH CV ECHO MEAS - AVA(I,A): 2.5 CM^2
BH CV ECHO MEAS - AVA(I,D): 2.5 CM^2
BH CV ECHO MEAS - AVA(V,A): 2.4 CM^2
BH CV ECHO MEAS - AVA(V,D): 2.4 CM^2
BH CV ECHO MEAS - BSA(HAYCOCK): 2.1 M^2
BH CV ECHO MEAS - BSA: 2 M^2
BH CV ECHO MEAS - BZI_BMI: 37.6 KILOGRAMS/M^2
BH CV ECHO MEAS - BZI_METRIC_HEIGHT: 160 CM
BH CV ECHO MEAS - BZI_METRIC_WEIGHT: 96.2 KG
BH CV ECHO MEAS - CONTRAST EF (2CH): 64.1 ML/M^2
BH CV ECHO MEAS - CONTRAST EF 4CH: 61.6 ML/M^2
BH CV ECHO MEAS - EDV(CUBED): 88.1 ML
BH CV ECHO MEAS - EDV(MOD-SP2): 107 ML
BH CV ECHO MEAS - EDV(MOD-SP4): 123 ML
BH CV ECHO MEAS - EDV(TEICH): 90.1 ML
BH CV ECHO MEAS - EF(CUBED): 70 %
BH CV ECHO MEAS - EF(MOD-SP2): 64.1 %
BH CV ECHO MEAS - EF(MOD-SP4): 61.6 %
BH CV ECHO MEAS - EF(TEICH): 61.8 %
BH CV ECHO MEAS - ESV(CUBED): 26.5 ML
BH CV ECHO MEAS - ESV(MOD-SP2): 38.4 ML
BH CV ECHO MEAS - ESV(MOD-SP4): 47.2 ML
BH CV ECHO MEAS - ESV(TEICH): 34.4 ML
BH CV ECHO MEAS - FS: 33 %
BH CV ECHO MEAS - IVS/LVPW: 1.1
BH CV ECHO MEAS - IVSD: 1.1 CM
BH CV ECHO MEAS - LAT PEAK E' VEL: 9 CM/SEC
BH CV ECHO MEAS - LV DIASTOLIC VOL/BSA (35-75): 62 ML/M^2
BH CV ECHO MEAS - LV MASS(C)D: 169.8 GRAMS
BH CV ECHO MEAS - LV MASS(C)DI: 85.6 GRAMS/M^2
BH CV ECHO MEAS - LV MAX PG: 7.5 MMHG
BH CV ECHO MEAS - LV MEAN PG: 4 MMHG
BH CV ECHO MEAS - LV SYSTOLIC VOL/BSA (12-30): 23.8 ML/M^2
BH CV ECHO MEAS - LV V1 MAX: 137 CM/SEC
BH CV ECHO MEAS - LV V1 MEAN: 93 CM/SEC
BH CV ECHO MEAS - LV V1 VTI: 31.7 CM
BH CV ECHO MEAS - LVIDD: 4.5 CM
BH CV ECHO MEAS - LVIDS: 3 CM
BH CV ECHO MEAS - LVLD AP2: 7.6 CM
BH CV ECHO MEAS - LVLD AP4: 8.1 CM
BH CV ECHO MEAS - LVLS AP2: 6.7 CM
BH CV ECHO MEAS - LVLS AP4: 7 CM
BH CV ECHO MEAS - LVOT AREA (M): 3.1 CM^2
BH CV ECHO MEAS - LVOT AREA: 3.1 CM^2
BH CV ECHO MEAS - LVOT DIAM: 2 CM
BH CV ECHO MEAS - LVPWD: 1.1 CM
BH CV ECHO MEAS - MED PEAK E' VEL: 6 CM/SEC
BH CV ECHO MEAS - MV A DUR: 0.11 SEC
BH CV ECHO MEAS - MV A MAX VEL: 106 CM/SEC
BH CV ECHO MEAS - MV DEC SLOPE: 838 CM/SEC^2
BH CV ECHO MEAS - MV DEC TIME: 0.16 SEC
BH CV ECHO MEAS - MV E MAX VEL: 77.6 CM/SEC
BH CV ECHO MEAS - MV E/A: 0.73
BH CV ECHO MEAS - MV MAX PG: 5.9 MMHG
BH CV ECHO MEAS - MV MEAN PG: 2 MMHG
BH CV ECHO MEAS - MV P1/2T MAX VEL: 121 CM/SEC
BH CV ECHO MEAS - MV P1/2T: 42.3 MSEC
BH CV ECHO MEAS - MV V2 MAX: 121 CM/SEC
BH CV ECHO MEAS - MV V2 MEAN: 72 CM/SEC
BH CV ECHO MEAS - MV V2 VTI: 30 CM
BH CV ECHO MEAS - MVA P1/2T LCG: 1.8 CM^2
BH CV ECHO MEAS - MVA(P1/2T): 5.2 CM^2
BH CV ECHO MEAS - MVA(VTI): 3.3 CM^2
BH CV ECHO MEAS - PA ACC TIME: 0.07 SEC
BH CV ECHO MEAS - PA MAX PG (FULL): 2.5 MMHG
BH CV ECHO MEAS - PA MAX PG: 7.5 MMHG
BH CV ECHO MEAS - PA PR(ACCEL): 47.5 MMHG
BH CV ECHO MEAS - PA V2 MAX: 137 CM/SEC
BH CV ECHO MEAS - PULM A REVS DUR: 0.12 SEC
BH CV ECHO MEAS - PULM A REVS VEL: 36.4 CM/SEC
BH CV ECHO MEAS - PULM DIAS VEL: 39.3 CM/SEC
BH CV ECHO MEAS - PULM S/D: 1.9
BH CV ECHO MEAS - PULM SYS VEL: 76.2 CM/SEC
BH CV ECHO MEAS - PVA(V,A): 2.8 CM^2
BH CV ECHO MEAS - PVA(V,D): 2.8 CM^2
BH CV ECHO MEAS - QP/QS: 0.77
BH CV ECHO MEAS - RV MAX PG: 5 MMHG
BH CV ECHO MEAS - RV MEAN PG: 3 MMHG
BH CV ECHO MEAS - RV V1 MAX: 112 CM/SEC
BH CV ECHO MEAS - RV V1 MEAN: 75.8 CM/SEC
BH CV ECHO MEAS - RV V1 VTI: 22 CM
BH CV ECHO MEAS - RVOT AREA: 3.5 CM^2
BH CV ECHO MEAS - RVOT DIAM: 2.1 CM
BH CV ECHO MEAS - SI(AO): 126.1 ML/M^2
BH CV ECHO MEAS - SI(CUBED): 31.1 ML/M^2
BH CV ECHO MEAS - SI(LVOT): 50.2 ML/M^2
BH CV ECHO MEAS - SI(MOD-SP2): 34.6 ML/M^2
BH CV ECHO MEAS - SI(MOD-SP4): 38.2 ML/M^2
BH CV ECHO MEAS - SI(TEICH): 28.1 ML/M^2
BH CV ECHO MEAS - SUP REN AO DIAM: 2 CM
BH CV ECHO MEAS - SV(AO): 250 ML
BH CV ECHO MEAS - SV(CUBED): 61.7 ML
BH CV ECHO MEAS - SV(LVOT): 99.6 ML
BH CV ECHO MEAS - SV(MOD-SP2): 68.6 ML
BH CV ECHO MEAS - SV(MOD-SP4): 75.8 ML
BH CV ECHO MEAS - SV(RVOT): 76.2 ML
BH CV ECHO MEAS - SV(TEICH): 55.6 ML
BH CV ECHO MEAS - TAPSE (>1.6): 1.7 CM2
BH CV NUCLEAR PRIOR STUDY: 3
BH CV STRESS BP STAGE 1: NORMAL
BH CV STRESS BP STAGE 2: NORMAL
BH CV STRESS BP STAGE 3: NORMAL
BH CV STRESS BP STAGE 4: NORMAL
BH CV STRESS BP STAGE 5: NORMAL
BH CV STRESS COMMENTS STAGE 1: NORMAL
BH CV STRESS COMMENTS STAGE 2: NORMAL
BH CV STRESS DOSE REGADENOSON STAGE 1: 0.4
BH CV STRESS DURATION MIN STAGE 1: 0
BH CV STRESS DURATION MIN STAGE 2: 4
BH CV STRESS DURATION SEC STAGE 1: 15
BH CV STRESS DURATION SEC STAGE 2: 0
BH CV STRESS HR STAGE 1: 89
BH CV STRESS HR STAGE 2: 99
BH CV STRESS HR STAGE 3: 96
BH CV STRESS HR STAGE 4: 95
BH CV STRESS HR STAGE 5: 90
BH CV STRESS O2 STAGE 1: 90
BH CV STRESS O2 STAGE 2: 93
BH CV STRESS O2 STAGE 4: 97
BH CV STRESS O2 STAGE 5: 98
BH CV STRESS PROTOCOL 1: NORMAL
BH CV STRESS RECOVERY BP: NORMAL MMHG
BH CV STRESS RECOVERY HR: 90 BPM
BH CV STRESS STAGE 1: 1
BH CV STRESS STAGE 2: 2
BH CV STRESS STAGE 3: 3
BH CV STRESS STAGE 4: 4
BH CV STRESS STAGE 5: 5
BH CV VAS BP RIGHT ARM: NORMAL MMHG
BH CV XLRA - RV BASE: 3.9 CM
BH CV XLRA - TDI S': 14 CM/SEC
E/E' RATIO: 11
LEFT ATRIUM VOLUME INDEX: 22 ML/M2
LV EF NUC BP: 70 %
MAXIMAL PREDICTED HEART RATE: 145 BPM
PERCENT MAX PREDICTED HR: 68.97 %
STRESS BASELINE BP: NORMAL MMHG
STRESS BASELINE HR: 76 BPM
STRESS O2 SAT REST: 94 %
STRESS PERCENT HR: 81 %
STRESS POST ESTIMATED WORKLOAD: 1 METS
STRESS POST EXERCISE DUR SEC: 30 SEC
STRESS POST PEAK BP: NORMAL MMHG
STRESS POST PEAK HR: 100 BPM
STRESS TARGET HR: 123 BPM

## 2017-10-05 PROCEDURE — 93016 CV STRESS TEST SUPVJ ONLY: CPT | Performed by: INTERNAL MEDICINE

## 2017-10-05 PROCEDURE — A9500 TC99M SESTAMIBI: HCPCS | Performed by: INTERNAL MEDICINE

## 2017-10-05 PROCEDURE — 93306 TTE W/DOPPLER COMPLETE: CPT | Performed by: INTERNAL MEDICINE

## 2017-10-05 PROCEDURE — 25010000002 PERFLUTREN (DEFINITY) 8.476 MG IN SODIUM CHLORIDE 0.9 % 10 ML INJECTION: Performed by: INTERNAL MEDICINE

## 2017-10-05 PROCEDURE — 78452 HT MUSCLE IMAGE SPECT MULT: CPT

## 2017-10-05 PROCEDURE — 93306 TTE W/DOPPLER COMPLETE: CPT

## 2017-10-05 PROCEDURE — 93018 CV STRESS TEST I&R ONLY: CPT | Performed by: INTERNAL MEDICINE

## 2017-10-05 PROCEDURE — 0 TECHNETIUM SESTAMIBI: Performed by: INTERNAL MEDICINE

## 2017-10-05 PROCEDURE — 93017 CV STRESS TEST TRACING ONLY: CPT

## 2017-10-05 PROCEDURE — 0399T HC MYOCARDL STRAIN IMAG QUAN ASSMT PER SESS: CPT

## 2017-10-05 PROCEDURE — 78452 HT MUSCLE IMAGE SPECT MULT: CPT | Performed by: INTERNAL MEDICINE

## 2017-10-05 PROCEDURE — 25010000002 REGADENOSON 0.4 MG/5ML SOLUTION: Performed by: INTERNAL MEDICINE

## 2017-10-05 PROCEDURE — 0399T ADULT TRANSTHORACIC ECHO COMPLETE W/ CONT IF NECESSARY PER PROTOCOL: CPT | Performed by: INTERNAL MEDICINE

## 2017-10-05 RX ADMIN — TECHNETIUM TC-99M SESTAMIBI 1 DOSE: 1 INJECTION INTRAVENOUS at 10:45

## 2017-10-05 RX ADMIN — PERFLUTREN 2 ML: 6.52 INJECTION, SUSPENSION INTRAVENOUS at 11:16

## 2017-10-05 RX ADMIN — REGADENOSON 0.4 MG: 0.08 INJECTION, SOLUTION INTRAVENOUS at 10:45

## 2017-10-05 RX ADMIN — TECHNETIUM TC-99M SESTAMIBI 1 DOSE: 1 INJECTION INTRAVENOUS at 09:15

## 2017-10-06 ENCOUNTER — TELEPHONE (OUTPATIENT)
Dept: INTERNAL MEDICINE | Facility: CLINIC | Age: 77
End: 2017-10-06

## 2017-10-06 NOTE — TELEPHONE ENCOUNTER
UNABLE TO LVM    ----- Message from Chari Mercado MD sent at 10/6/2017 10:56 AM EDT -----  Call pt about stress test.  Study not totally normal but low risk.  Keep upcoming cardiology appt for follow up.

## 2017-10-09 ENCOUNTER — APPOINTMENT (OUTPATIENT)
Dept: PREADMISSION TESTING | Facility: HOSPITAL | Age: 77
End: 2017-10-09

## 2017-10-09 VITALS
DIASTOLIC BLOOD PRESSURE: 65 MMHG | BODY MASS INDEX: 38.31 KG/M2 | WEIGHT: 216.2 LBS | HEIGHT: 63 IN | RESPIRATION RATE: 18 BRPM | SYSTOLIC BLOOD PRESSURE: 156 MMHG | HEART RATE: 100 BPM | OXYGEN SATURATION: 94 %

## 2017-10-09 DIAGNOSIS — M84.469A INSUFFICIENCY FRACTURE OF TIBIA, INITIAL ENCOUNTER: ICD-10-CM

## 2017-10-09 DIAGNOSIS — S83.232D COMPLEX TEAR OF MEDIAL MENISCUS OF LEFT KNEE AS CURRENT INJURY, SUBSEQUENT ENCOUNTER: ICD-10-CM

## 2017-10-09 LAB
ANION GAP SERPL CALCULATED.3IONS-SCNC: 13.6 MMOL/L
APTT PPP: 32 SECONDS (ref 24.3–38.1)
BASOPHILS # BLD AUTO: 0.07 10*3/MM3 (ref 0–0.2)
BASOPHILS NFR BLD AUTO: 1 % (ref 0–2)
BUN BLD-MCNC: 25 MG/DL (ref 8–23)
BUN/CREAT SERPL: 14.5 (ref 7–25)
CALCIUM SPEC-SCNC: 9.2 MG/DL (ref 8.8–10.5)
CHLORIDE SERPL-SCNC: 102 MMOL/L (ref 98–107)
CO2 SERPL-SCNC: 25.4 MMOL/L (ref 22–29)
CREAT BLD-MCNC: 1.73 MG/DL (ref 0.57–1)
DEPRECATED RDW RBC AUTO: 64.4 FL (ref 37–54)
EOSINOPHIL # BLD AUTO: 0.34 10*3/MM3 (ref 0.1–0.3)
EOSINOPHIL NFR BLD AUTO: 4.7 % (ref 0–4)
ERYTHROCYTE [DISTWIDTH] IN BLOOD BY AUTOMATED COUNT: 17.4 % (ref 11.5–14.5)
GFR SERPL CREATININE-BSD FRML MDRD: 29 ML/MIN/1.73
GLUCOSE BLD-MCNC: 217 MG/DL (ref 65–99)
HCT VFR BLD AUTO: 30.6 % (ref 37–47)
HGB BLD-MCNC: 10 G/DL (ref 12–16)
IMM GRANULOCYTES # BLD: 0.08 10*3/MM3 (ref 0–0.03)
IMM GRANULOCYTES NFR BLD: 1.1 % (ref 0–0.5)
INR PPP: 1.12 (ref 0.9–1.1)
LYMPHOCYTES # BLD AUTO: 2.64 10*3/MM3 (ref 0.6–4.8)
LYMPHOCYTES NFR BLD AUTO: 36.3 % (ref 20–45)
MCH RBC QN AUTO: 33 PG (ref 27–31)
MCHC RBC AUTO-ENTMCNC: 32.7 G/DL (ref 31–37)
MCV RBC AUTO: 101 FL (ref 81–99)
MONOCYTES # BLD AUTO: 0.33 10*3/MM3 (ref 0–1)
MONOCYTES NFR BLD AUTO: 4.5 % (ref 3–8)
NEUTROPHILS # BLD AUTO: 3.81 10*3/MM3 (ref 1.5–8.3)
NEUTROPHILS NFR BLD AUTO: 52.4 % (ref 45–70)
NRBC BLD MANUAL-RTO: 0 /100 WBC (ref 0–0)
PLATELET # BLD AUTO: 285 10*3/MM3 (ref 140–500)
PMV BLD AUTO: 11.6 FL (ref 7.4–10.4)
POTASSIUM BLD-SCNC: 5.3 MMOL/L (ref 3.5–5.2)
PROTHROMBIN TIME: 14.5 SECONDS (ref 12.1–15)
RBC # BLD AUTO: 3.03 10*6/MM3 (ref 4.2–5.4)
SODIUM BLD-SCNC: 141 MMOL/L (ref 136–145)
WBC NRBC COR # BLD: 7.27 10*3/MM3 (ref 4.8–10.8)

## 2017-10-09 PROCEDURE — 85610 PROTHROMBIN TIME: CPT | Performed by: ORTHOPAEDIC SURGERY

## 2017-10-09 PROCEDURE — 36415 COLL VENOUS BLD VENIPUNCTURE: CPT

## 2017-10-09 PROCEDURE — 80048 BASIC METABOLIC PNL TOTAL CA: CPT | Performed by: ORTHOPAEDIC SURGERY

## 2017-10-09 PROCEDURE — 85730 THROMBOPLASTIN TIME PARTIAL: CPT | Performed by: ORTHOPAEDIC SURGERY

## 2017-10-09 PROCEDURE — 85025 COMPLETE CBC W/AUTO DIFF WBC: CPT | Performed by: ORTHOPAEDIC SURGERY

## 2017-10-09 RX ORDER — LEVOTHYROXINE SODIUM 0.07 MG/1
75 TABLET ORAL EVERY MORNING
COMMUNITY
End: 2018-08-01 | Stop reason: DRUGHIGH

## 2017-10-09 RX ORDER — FUROSEMIDE 20 MG/1
20 TABLET ORAL EVERY MORNING
COMMUNITY
End: 2018-05-16

## 2017-10-09 RX ORDER — THIAMINE MONONITRATE (VIT B1) 100 MG
100 TABLET ORAL DAILY
Status: ON HOLD | COMMUNITY
End: 2018-10-25

## 2017-10-09 RX ORDER — LISINOPRIL 5 MG/1
5 TABLET ORAL EVERY MORNING
COMMUNITY
End: 2017-11-06 | Stop reason: HOSPADM

## 2017-10-09 RX ORDER — DULOXETIN HYDROCHLORIDE 60 MG/1
60 CAPSULE, DELAYED RELEASE ORAL EVERY MORNING
COMMUNITY
End: 2018-05-16

## 2017-10-09 RX ORDER — CHOLECALCIFEROL (VITAMIN D3) 125 MCG
5 CAPSULE ORAL NIGHTLY PRN
COMMUNITY
End: 2018-10-04

## 2017-10-09 RX ORDER — ATORVASTATIN CALCIUM 10 MG/1
10 TABLET, FILM COATED ORAL NIGHTLY
COMMUNITY
End: 2018-07-26 | Stop reason: SDUPTHER

## 2017-10-09 RX ORDER — POTASSIUM CHLORIDE 750 MG/1
10 TABLET, FILM COATED, EXTENDED RELEASE ORAL EVERY MORNING
COMMUNITY
End: 2018-05-23

## 2017-10-09 RX ORDER — GABAPENTIN 400 MG/1
400 CAPSULE ORAL 3 TIMES DAILY
COMMUNITY
End: 2018-02-23 | Stop reason: SDUPTHER

## 2017-10-09 RX ORDER — CYCLOBENZAPRINE HCL 10 MG
10 TABLET ORAL 3 TIMES DAILY PRN
COMMUNITY
End: 2018-03-26 | Stop reason: SDUPTHER

## 2017-10-09 RX ORDER — OMEPRAZOLE 40 MG/1
40 CAPSULE, DELAYED RELEASE ORAL EVERY MORNING
COMMUNITY
End: 2018-04-23 | Stop reason: SDUPTHER

## 2017-10-09 NOTE — DISCHARGE INSTRUCTIONS
STOP CLEARS/ GATORADE 2 HOURS PRIOR TO ARRIVAL    MORNING OF SURGERY TAKE METOPROLOL & OMEPRAZOLE    PRE-ADMISSION TESTING INSTRUCTIONS FOR ADULTS      General Instructions:    • Do not eat solid food after midnight the night before surgery.  No gum, mints, or hard candy after midnight the night before surgery.  • You may drink clear liquids the day of surgery up until 2 hours before your arrival time.  • Clear liquids are liquids you can see through. Nothing RED in color.    Plain water    Sports drinks  Sodas     Gelatin (Jell-O)  Fruit juices without pulp such as white grape juice and apple juice  Popsicles that contain no fruit or yogurt  Tea or coffee (no cream or milk added)    • It is beneficial for you to have a clear drink that contains carbohydrates just before you leave your house and before your fasting time begins.  We suggest a 20 ounce bottle of Gatorade or Powerade for non-diabetic patients or a 20 ounce bottle of G2 or Powerade Zero for diabetic patients.     • Patients who avoid smoking, chewing tobacco and alcohol for 4 weeks prior to surgery have a reduced risk of post-operative complications.  • Do not smoke, use chewing tobacco or drink alcohol the day of surgery    • Bring your C-PAP/ BI-PAP machine if you use one.  • Wear clean comfortable clothes and socks.  • Do not wear contact lenses, lotion, deodorant, or make-up.  Bring a case for your glasses if applicable.   • Bring crutches or walker if applicable.  • Leave all other valuables and jewelry at home.      Preventing a Surgical Site Infection:    • Shower the night before and on the morning of surgery using the chlorhexidine soap you were given.  Use a clean washcloth with the soap.  Place clean sheets on your bed after showering the night before surgery. Do not use the CHG soap on your hair, face, or private areas. Wash your body gently for five (5) minutes. Do not scrub your skin too hard.  Dry with a clean towel and dress in clean  clothing.    • Do not shave the surgical area for 10 days-2 weeks prior to surgery  because the razor can irritate skin and make it easier to develop an infection.    • Make sure you, your family, and all healthcare providers clean their hands with soap and water or an alcohol based hand  before caring for you or your wound.    • If at all possible, quit smoking as many days before surgery as you can.    Day of surgery:    Your surgeon’s office will advise you of your arrival time for the day of surgery.    Upon arrival, a Pre-op nurse and Anesthesia provider will review your health history, obtain vital signs, and answer questions you may have.  The only belongings needed at this time will be your home medications and if applicable your C-PAP/BI-PAP machine.  If you are staying overnight your family can leave the rest of your belongings in the car and bring them to your room later.  A Pre-op nurse will start an IV and you may receive medication in preparation for surgery, including something to help you relax.  Your family will be able to see you in the Pre-op area.  While you are in surgery your family should notify the waiting room  if they leave the waiting room area and provide a contact phone number.    IF you have any questions, you can call the Pre-Admission Department at [phone] or your surgeon's office.    Please be aware that surgery does come with discomfort.  We want to make every effort to control your discomfort so please discuss any uncontrolled symptoms with your nurse.   Your doctor will most likely have prescribed pain medications.      If you are going home after surgery, you will receive individualized written care instructions before being discharged.  A responsible adult (over the age of 18) must drive you to and from the hospital on the day of your surgery and stay with you for 24 hours after anesthesia.    If you are staying overnight following surgery, you will  be transported to your hospital room following the recovery period.  [facility] has all private rooms.    Deductibles and co-payments are collected on the day of service. Please be prepared to pay the required co-pay, deductible or deposit on the day of service as defined by your plan.

## 2017-10-20 ENCOUNTER — TELEPHONE (OUTPATIENT)
Dept: INTERNAL MEDICINE | Facility: CLINIC | Age: 77
End: 2017-10-20

## 2017-10-20 NOTE — TELEPHONE ENCOUNTER
Note was put in Dr. Rea's folder, no response from her, Dr. Rea currently gone on vacation, patient needs to go to ER if she is short of breath. I called and spoke with patient today, (10/20/17)she did not go to the ER, I asked her how she was doing today, she states she is a little better, but still dizzy and short of breath. She states she does not like Dr. Lee so she does not want to go to the ER. I explained to her that if she is short of breath, she needs to be seen, she states she will go, I told patient to call me back if she needs anything else. She has a follow up appointment with Dr. Rea when she returns from vacation.  ----- Message from Johnna John MA sent at 10/18/2017  8:54 AM CRISTYT -----  Regarding: FW:  REQUESTING APPOINTMENT ASAP  Contact: [phone]      ----- Message -----     From: Claudia Corea     Sent: 10/17/2017   2:56 PM       To: [facility] Clinical [facility]  Subject:  REQUESTING APPOINTMENT ALEXIS REA PT     , CARLOS ALBERTO, CALLED TO SAY THAT MARYCRUZ NEEDS TO COME IN TOMORROW TO SEE DR REA. SHE FEELS REALLY BAD, DIZZY, SHORT OF BREATH.  PLEASE CALL CARLOS ALBERTO AT THE NUMBER ABOVE

## 2017-11-03 ENCOUNTER — APPOINTMENT (OUTPATIENT)
Dept: CT IMAGING | Facility: HOSPITAL | Age: 77
End: 2017-11-03

## 2017-11-03 ENCOUNTER — APPOINTMENT (OUTPATIENT)
Dept: NUCLEAR MEDICINE | Facility: HOSPITAL | Age: 77
End: 2017-11-03

## 2017-11-03 ENCOUNTER — APPOINTMENT (OUTPATIENT)
Dept: GENERAL RADIOLOGY | Facility: HOSPITAL | Age: 77
End: 2017-11-03

## 2017-11-03 ENCOUNTER — OFFICE VISIT (OUTPATIENT)
Dept: INTERNAL MEDICINE | Facility: CLINIC | Age: 77
End: 2017-11-03

## 2017-11-03 ENCOUNTER — HOSPITAL ENCOUNTER (INPATIENT)
Facility: HOSPITAL | Age: 77
LOS: 3 days | Discharge: HOME HEALTH | End: 2017-11-06
Attending: EMERGENCY MEDICINE | Admitting: INTERNAL MEDICINE

## 2017-11-03 VITALS — HEIGHT: 63 IN

## 2017-11-03 DIAGNOSIS — E11.69 DIABETES MELLITUS TYPE 2 IN OBESE (HCC): ICD-10-CM

## 2017-11-03 DIAGNOSIS — R09.02 HYPOXIA: Primary | ICD-10-CM

## 2017-11-03 DIAGNOSIS — E66.9 DIABETES MELLITUS TYPE 2 IN OBESE (HCC): ICD-10-CM

## 2017-11-03 DIAGNOSIS — G47.33 OBSTRUCTIVE SLEEP APNEA SYNDROME: ICD-10-CM

## 2017-11-03 DIAGNOSIS — Z79.4 TYPE 2 DIABETES MELLITUS WITH DIABETIC AUTONOMIC NEUROPATHY, WITH LONG-TERM CURRENT USE OF INSULIN (HCC): ICD-10-CM

## 2017-11-03 DIAGNOSIS — E11.43 TYPE 2 DIABETES MELLITUS WITH DIABETIC AUTONOMIC NEUROPATHY, WITH LONG-TERM CURRENT USE OF INSULIN (HCC): ICD-10-CM

## 2017-11-03 LAB
ALBUMIN SERPL-MCNC: 3.7 G/DL (ref 3.5–5.2)
ALBUMIN/GLOB SERPL: 1.1 G/DL
ALP SERPL-CCNC: 130 U/L (ref 40–129)
ALT SERPL W P-5'-P-CCNC: 6 U/L (ref 5–33)
ANION GAP SERPL CALCULATED.3IONS-SCNC: 12.1 MMOL/L
APTT PPP: 33.6 SECONDS (ref 24.3–38.1)
AST SERPL-CCNC: 8 U/L (ref 5–32)
BASOPHILS # BLD AUTO: 0.06 10*3/MM3 (ref 0–0.2)
BASOPHILS NFR BLD AUTO: 0.7 % (ref 0–2)
BILIRUB SERPL-MCNC: 0.4 MG/DL (ref 0.2–1.2)
BILIRUB UR QL STRIP: NEGATIVE
BUN BLD-MCNC: 42 MG/DL (ref 8–23)
BUN/CREAT SERPL: 19.4 (ref 7–25)
CALCIUM SPEC-SCNC: 9.3 MG/DL (ref 8.8–10.5)
CHLORIDE SERPL-SCNC: 102 MMOL/L (ref 98–107)
CLARITY UR: CLEAR
CO2 SERPL-SCNC: 27.9 MMOL/L (ref 22–29)
COLOR UR: YELLOW
CREAT BLD-MCNC: 2.17 MG/DL (ref 0.57–1)
D DIMER PPP FEU-MCNC: 0.22 MCGFEU/ML (ref 0–0.46)
DEPRECATED RDW RBC AUTO: 62.5 FL (ref 37–54)
EOSINOPHIL # BLD AUTO: 0.31 10*3/MM3 (ref 0.1–0.3)
EOSINOPHIL NFR BLD AUTO: 3.8 % (ref 0–4)
ERYTHROCYTE [DISTWIDTH] IN BLOOD BY AUTOMATED COUNT: 17.1 % (ref 11.5–14.5)
FLUAV AG NPH QL: NEGATIVE
FLUBV AG NPH QL IA: NEGATIVE
GFR SERPL CREATININE-BSD FRML MDRD: 22 ML/MIN/1.73
GLOBULIN UR ELPH-MCNC: 3.5 GM/DL
GLUCOSE BLD-MCNC: 106 MG/DL (ref 65–99)
GLUCOSE BLDC GLUCOMTR-MCNC: 227 MG/DL (ref 70–130)
GLUCOSE BLDC GLUCOMTR-MCNC: 65 MG/DL (ref 70–130)
GLUCOSE UR STRIP-MCNC: NEGATIVE MG/DL
HCT VFR BLD AUTO: 31.2 % (ref 37–47)
HGB BLD-MCNC: 10.1 G/DL (ref 12–16)
HGB UR QL STRIP.AUTO: NEGATIVE
IMM GRANULOCYTES # BLD: 0.04 10*3/MM3 (ref 0–0.03)
IMM GRANULOCYTES NFR BLD: 0.5 % (ref 0–0.5)
INR PPP: 1.18 (ref 0.9–1.1)
KETONES UR QL STRIP: NEGATIVE
LEUKOCYTE ESTERASE UR QL STRIP.AUTO: NEGATIVE
LYMPHOCYTES # BLD AUTO: 3 10*3/MM3 (ref 0.6–4.8)
LYMPHOCYTES NFR BLD AUTO: 36.3 % (ref 20–45)
MCH RBC QN AUTO: 32.3 PG (ref 27–31)
MCHC RBC AUTO-ENTMCNC: 32.4 G/DL (ref 31–37)
MCV RBC AUTO: 99.7 FL (ref 81–99)
MONOCYTES # BLD AUTO: 0.27 10*3/MM3 (ref 0–1)
MONOCYTES NFR BLD AUTO: 3.3 % (ref 3–8)
NEUTROPHILS # BLD AUTO: 4.58 10*3/MM3 (ref 1.5–8.3)
NEUTROPHILS NFR BLD AUTO: 55.4 % (ref 45–70)
NITRITE UR QL STRIP: NEGATIVE
NRBC BLD MANUAL-RTO: 0 /100 WBC (ref 0–0)
NT-PROBNP SERPL-MCNC: 86.7 PG/ML (ref 5–450)
PH UR STRIP.AUTO: 5.5 [PH] (ref 4.5–8)
PLATELET # BLD AUTO: 308 10*3/MM3 (ref 140–500)
PMV BLD AUTO: 12.1 FL (ref 7.4–10.4)
POTASSIUM BLD-SCNC: 4.7 MMOL/L (ref 3.5–5.2)
PROT SERPL-MCNC: 7.2 G/DL (ref 6–8.5)
PROT UR QL STRIP: NEGATIVE
PROTHROMBIN TIME: 15.1 SECONDS (ref 12.1–15)
RBC # BLD AUTO: 3.13 10*6/MM3 (ref 4.2–5.4)
SODIUM BLD-SCNC: 142 MMOL/L (ref 136–145)
SP GR UR STRIP: 1.01 (ref 1–1.03)
TROPONIN T SERPL-MCNC: <0.01 NG/ML (ref 0–0.03)
TROPONIN T SERPL-MCNC: <0.01 NG/ML (ref 0–0.03)
TSH SERPL DL<=0.05 MIU/L-ACNC: 3.16 MIU/ML (ref 0.27–4.2)
UROBILINOGEN UR QL STRIP: NORMAL
WBC NRBC COR # BLD: 8.26 10*3/MM3 (ref 4.8–10.8)

## 2017-11-03 PROCEDURE — 85610 PROTHROMBIN TIME: CPT | Performed by: EMERGENCY MEDICINE

## 2017-11-03 PROCEDURE — 81003 URINALYSIS AUTO W/O SCOPE: CPT | Performed by: EMERGENCY MEDICINE

## 2017-11-03 PROCEDURE — 94799 UNLISTED PULMONARY SVC/PX: CPT

## 2017-11-03 PROCEDURE — 80053 COMPREHEN METABOLIC PANEL: CPT | Performed by: EMERGENCY MEDICINE

## 2017-11-03 PROCEDURE — 94761 N-INVAS EAR/PLS OXIMETRY MLT: CPT

## 2017-11-03 PROCEDURE — 93010 ELECTROCARDIOGRAM REPORT: CPT | Performed by: INTERNAL MEDICINE

## 2017-11-03 PROCEDURE — 99223 1ST HOSP IP/OBS HIGH 75: CPT | Performed by: FAMILY MEDICINE

## 2017-11-03 PROCEDURE — 83880 ASSAY OF NATRIURETIC PEPTIDE: CPT | Performed by: EMERGENCY MEDICINE

## 2017-11-03 PROCEDURE — 87486 CHLMYD PNEUM DNA AMP PROBE: CPT | Performed by: INTERNAL MEDICINE

## 2017-11-03 PROCEDURE — 71010 HC CHEST PA OR AP: CPT

## 2017-11-03 PROCEDURE — 99285 EMERGENCY DEPT VISIT HI MDM: CPT

## 2017-11-03 PROCEDURE — 82962 GLUCOSE BLOOD TEST: CPT

## 2017-11-03 PROCEDURE — 84484 ASSAY OF TROPONIN QUANT: CPT | Performed by: INTERNAL MEDICINE

## 2017-11-03 PROCEDURE — 99284 EMERGENCY DEPT VISIT MOD MDM: CPT | Performed by: EMERGENCY MEDICINE

## 2017-11-03 PROCEDURE — 87798 DETECT AGENT NOS DNA AMP: CPT | Performed by: INTERNAL MEDICINE

## 2017-11-03 PROCEDURE — 84484 ASSAY OF TROPONIN QUANT: CPT | Performed by: EMERGENCY MEDICINE

## 2017-11-03 PROCEDURE — 0 TECHNETIUM TC 99M PENTETATE KIT: Performed by: INTERNAL MEDICINE

## 2017-11-03 PROCEDURE — 87804 INFLUENZA ASSAY W/OPTIC: CPT | Performed by: EMERGENCY MEDICINE

## 2017-11-03 PROCEDURE — 0 TECHNETIUM ALBUMIN AGGREGATED: Performed by: INTERNAL MEDICINE

## 2017-11-03 PROCEDURE — A9539 TC99M PENTETATE: HCPCS | Performed by: INTERNAL MEDICINE

## 2017-11-03 PROCEDURE — 87633 RESP VIRUS 12-25 TARGETS: CPT | Performed by: INTERNAL MEDICINE

## 2017-11-03 PROCEDURE — 93005 ELECTROCARDIOGRAM TRACING: CPT | Performed by: EMERGENCY MEDICINE

## 2017-11-03 PROCEDURE — 78582 LUNG VENTILAT&PERFUS IMAGING: CPT

## 2017-11-03 PROCEDURE — 85379 FIBRIN DEGRADATION QUANT: CPT | Performed by: EMERGENCY MEDICINE

## 2017-11-03 PROCEDURE — 87581 M.PNEUMON DNA AMP PROBE: CPT | Performed by: INTERNAL MEDICINE

## 2017-11-03 PROCEDURE — 85025 COMPLETE CBC W/AUTO DIFF WBC: CPT | Performed by: EMERGENCY MEDICINE

## 2017-11-03 PROCEDURE — 71250 CT THORAX DX C-: CPT

## 2017-11-03 PROCEDURE — 99215 OFFICE O/P EST HI 40 MIN: CPT | Performed by: INTERNAL MEDICINE

## 2017-11-03 PROCEDURE — 25010000002 METHYLPREDNISOLONE PER 125 MG: Performed by: INTERNAL MEDICINE

## 2017-11-03 PROCEDURE — 84443 ASSAY THYROID STIM HORMONE: CPT | Performed by: EMERGENCY MEDICINE

## 2017-11-03 PROCEDURE — 85730 THROMBOPLASTIN TIME PARTIAL: CPT | Performed by: EMERGENCY MEDICINE

## 2017-11-03 PROCEDURE — A9540 TC99M MAA: HCPCS | Performed by: INTERNAL MEDICINE

## 2017-11-03 RX ORDER — IPRATROPIUM BROMIDE AND ALBUTEROL SULFATE 2.5; .5 MG/3ML; MG/3ML
3 SOLUTION RESPIRATORY (INHALATION) ONCE
Status: COMPLETED | OUTPATIENT
Start: 2017-11-03 | End: 2017-11-03

## 2017-11-03 RX ORDER — LEVOTHYROXINE SODIUM 0.07 MG/1
75 TABLET ORAL EVERY MORNING
Status: DISCONTINUED | OUTPATIENT
Start: 2017-11-04 | End: 2017-11-06 | Stop reason: HOSPADM

## 2017-11-03 RX ORDER — LISINOPRIL 5 MG/1
5 TABLET ORAL EVERY MORNING
Status: DISCONTINUED | OUTPATIENT
Start: 2017-11-04 | End: 2017-11-04

## 2017-11-03 RX ORDER — DOCUSATE SODIUM 100 MG/1
100 CAPSULE, LIQUID FILLED ORAL 2 TIMES DAILY
Status: DISCONTINUED | OUTPATIENT
Start: 2017-11-03 | End: 2017-11-06 | Stop reason: HOSPADM

## 2017-11-03 RX ORDER — CYCLOBENZAPRINE HCL 10 MG
10 TABLET ORAL 3 TIMES DAILY PRN
Status: DISCONTINUED | OUTPATIENT
Start: 2017-11-03 | End: 2017-11-06 | Stop reason: HOSPADM

## 2017-11-03 RX ORDER — CHOLECALCIFEROL (VITAMIN D3) 125 MCG
5 CAPSULE ORAL NIGHTLY PRN
Status: DISCONTINUED | OUTPATIENT
Start: 2017-11-03 | End: 2017-11-06 | Stop reason: HOSPADM

## 2017-11-03 RX ORDER — GABAPENTIN 400 MG/1
400 CAPSULE ORAL 3 TIMES DAILY
Status: DISCONTINUED | OUTPATIENT
Start: 2017-11-03 | End: 2017-11-06 | Stop reason: HOSPADM

## 2017-11-03 RX ORDER — ATORVASTATIN CALCIUM 10 MG/1
10 TABLET, FILM COATED ORAL NIGHTLY
Status: DISCONTINUED | OUTPATIENT
Start: 2017-11-03 | End: 2017-11-06 | Stop reason: HOSPADM

## 2017-11-03 RX ORDER — ASPIRIN 325 MG
325 TABLET ORAL DAILY
Status: DISCONTINUED | OUTPATIENT
Start: 2017-11-03 | End: 2017-11-06 | Stop reason: HOSPADM

## 2017-11-03 RX ORDER — ACETAMINOPHEN 325 MG/1
650 TABLET ORAL EVERY 6 HOURS PRN
Status: DISCONTINUED | OUTPATIENT
Start: 2017-11-03 | End: 2017-11-06 | Stop reason: HOSPADM

## 2017-11-03 RX ORDER — PANTOPRAZOLE SODIUM 40 MG/1
40 TABLET, DELAYED RELEASE ORAL
Status: DISCONTINUED | OUTPATIENT
Start: 2017-11-04 | End: 2017-11-06 | Stop reason: HOSPADM

## 2017-11-03 RX ORDER — LANOLIN ALCOHOL/MO/W.PET/CERES
1000 CREAM (GRAM) TOPICAL DAILY
Status: DISCONTINUED | OUTPATIENT
Start: 2017-11-03 | End: 2017-11-06 | Stop reason: HOSPADM

## 2017-11-03 RX ORDER — IPRATROPIUM BROMIDE AND ALBUTEROL SULFATE 2.5; .5 MG/3ML; MG/3ML
3 SOLUTION RESPIRATORY (INHALATION)
Status: DISCONTINUED | OUTPATIENT
Start: 2017-11-03 | End: 2017-11-06 | Stop reason: HOSPADM

## 2017-11-03 RX ORDER — DULOXETIN HYDROCHLORIDE 60 MG/1
60 CAPSULE, DELAYED RELEASE ORAL EVERY MORNING
Status: DISCONTINUED | OUTPATIENT
Start: 2017-11-04 | End: 2017-11-06 | Stop reason: HOSPADM

## 2017-11-03 RX ORDER — HYDROCODONE BITARTRATE AND ACETAMINOPHEN 5; 325 MG/1; MG/1
1 TABLET ORAL EVERY 12 HOURS PRN
Status: DISCONTINUED | OUTPATIENT
Start: 2017-11-03 | End: 2017-11-06 | Stop reason: HOSPADM

## 2017-11-03 RX ORDER — FUROSEMIDE 20 MG/1
20 TABLET ORAL EVERY MORNING
Status: DISCONTINUED | OUTPATIENT
Start: 2017-11-04 | End: 2017-11-06 | Stop reason: HOSPADM

## 2017-11-03 RX ORDER — SODIUM CHLORIDE 0.9 % (FLUSH) 0.9 %
1-10 SYRINGE (ML) INJECTION AS NEEDED
Status: DISCONTINUED | OUTPATIENT
Start: 2017-11-03 | End: 2017-11-06 | Stop reason: HOSPADM

## 2017-11-03 RX ORDER — POTASSIUM CHLORIDE 750 MG/1
10 TABLET, FILM COATED, EXTENDED RELEASE ORAL EVERY MORNING
Status: DISCONTINUED | OUTPATIENT
Start: 2017-11-04 | End: 2017-11-06 | Stop reason: HOSPADM

## 2017-11-03 RX ORDER — THIAMINE MONONITRATE (VIT B1) 100 MG
100 TABLET ORAL DAILY
Status: DISCONTINUED | OUTPATIENT
Start: 2017-11-03 | End: 2017-11-06 | Stop reason: HOSPADM

## 2017-11-03 RX ORDER — SODIUM CHLORIDE 9 MG/ML
40 INJECTION, SOLUTION INTRAVENOUS AS NEEDED
Status: DISCONTINUED | OUTPATIENT
Start: 2017-11-03 | End: 2017-11-06 | Stop reason: HOSPADM

## 2017-11-03 RX ORDER — METHYLPREDNISOLONE SODIUM SUCCINATE 125 MG/2ML
60 INJECTION, POWDER, LYOPHILIZED, FOR SOLUTION INTRAMUSCULAR; INTRAVENOUS EVERY 6 HOURS
Status: DISCONTINUED | OUTPATIENT
Start: 2017-11-03 | End: 2017-11-04

## 2017-11-03 RX ADMIN — Medication: at 20:43

## 2017-11-03 RX ADMIN — Medication 5 MG: at 20:42

## 2017-11-03 RX ADMIN — Medication 1 TABLET: at 18:52

## 2017-11-03 RX ADMIN — Medication 100 MG: at 18:52

## 2017-11-03 RX ADMIN — IPRATROPIUM BROMIDE AND ALBUTEROL SULFATE 3 ML: .5; 3 SOLUTION RESPIRATORY (INHALATION) at 14:51

## 2017-11-03 RX ADMIN — CYANOCOBALAMIN TAB 1000 MCG 1000 MCG: 1000 TAB at 18:54

## 2017-11-03 RX ADMIN — GABAPENTIN 400 MG: 400 CAPSULE ORAL at 19:04

## 2017-11-03 RX ADMIN — ASPIRIN 325 MG: 325 TABLET, COATED ORAL at 18:53

## 2017-11-03 RX ADMIN — Medication 1 DOSE: at 16:45

## 2017-11-03 RX ADMIN — ATORVASTATIN CALCIUM 10 MG: 10 TABLET, FILM COATED ORAL at 20:42

## 2017-11-03 RX ADMIN — IPRATROPIUM BROMIDE AND ALBUTEROL SULFATE 3 ML: .5; 3 SOLUTION RESPIRATORY (INHALATION) at 19:51

## 2017-11-03 RX ADMIN — METOPROLOL TARTRATE 25 MG: 25 TABLET, FILM COATED ORAL at 18:53

## 2017-11-03 RX ADMIN — APIXABAN 2.5 MG: 2.5 TABLET, FILM COATED ORAL at 18:53

## 2017-11-03 RX ADMIN — KIT FOR THE PREPARATION OF TECHNETIUM TC 99M PENTETATE 1 DOSE: 20 INJECTION, POWDER, LYOPHILIZED, FOR SOLUTION INTRAVENOUS; RESPIRATORY (INHALATION) at 16:45

## 2017-11-03 RX ADMIN — METHYLPREDNISOLONE SODIUM SUCCINATE 60 MG: 125 INJECTION, POWDER, FOR SOLUTION INTRAMUSCULAR; INTRAVENOUS at 18:55

## 2017-11-03 NOTE — ED NOTES
Patient states does not feel much better after resp treatment given     Blanca Clemons RN  11/03/17 0023

## 2017-11-03 NOTE — ED PROVIDER NOTES
Subjective   History of Present Illness  History of Present Illness    Chief complaint: Dizzy and short of breath    Location: Home    Quality/Severity:  Sats as low as 78% at a primary care provider's office    Timing/Onset/Duration: Shortness of breath for the last 10 days    Modifying Factors: Surgeon makes it worse, oxygen makes it better    Associated Symptoms: No headache.  No fever chills.  The patient has a nonproductive cough.  No sore throat earache or nasal congestion.  The patient has been having intermittent chest pain but no chest pain now.  No abdominal pain, diarrhea, or burning when she urinates.  No nausea or vomiting.    Narrative: 77-year-old white female was sent from Dr. Mercado's office with low saturations.  She complains of shortness of breath and dizziness.  The patient has been short of breath for the last 10 days.  The patient's saturations were 78% at Dr. Mercado's office.  Dr. Mercado placed the patient 2 L of oxygen per minute and the sats increased to 95%.  The patient complains of some dizziness.  Dr. eMrcado relates that this dizziness is been a chronic complaint and she has not been able to determine etiology for the dizziness.  The patient has a history of DVTs.  She was recently switched to Eliquis.  Her INRs have been very labile on Coumadin.  The patient has stress test that was normal in September of this year and echo that was unremarkable in September this year per Dr. Mercado.    PCP:  Rogelio      Review of Systems   Constitutional: Negative for chills and fever.   HENT: Negative for congestion, ear pain and sore throat.    Eyes: Negative for pain and discharge.   Respiratory: Positive for cough and shortness of breath. Negative for chest tightness and wheezing.    Cardiovascular: Negative for chest pain, palpitations and leg swelling.   Gastrointestinal: Negative for abdominal pain, blood in stool, constipation, diarrhea, nausea and vomiting.   Genitourinary: Negative for decreased urine  "volume, dysuria and flank pain.   Musculoskeletal: Negative for back pain.   Skin: Negative for rash.   Neurological: Positive for dizziness. Negative for weakness and headaches.   Hematological: Negative for adenopathy.   Psychiatric/Behavioral: Negative for confusion.        Medication List      CONTINUE taking these medications          ACCU-CHEK FASTCLIX LANCETS misc   TEST 3-4 TIMES DAILY AS DIRECTED       ACCU-CHEK KENNETH SMARTVIEW w/Device kit       B-D SINGLE USE SWABS REGULAR pads       Needle (Disp) 30G X 1/2\" misc   Commonly known as:  BD DISP NEEDLES   To be used 3 times daily with Novolog Flexpen.       * ULTICARE MICRO PEN NEEDLES 32G X 4 MM misc   Generic drug:  Insulin Pen Needle       * ULTICARE PEN NEEDLES 29G X 12MM misc   Generic drug:  Insulin Pen Needle       * Insulin Pen Needle 31G X 6 MM misc   Commonly known as:  ULTICARE MINI PEN NEEDLES   To check blood sugar once daily       * Notice:  This list has 3 medication(s) that are the same as other   medications prescribed for you. Read the directions carefully, and ask   your doctor or other care provider to review them with you.      ASK your doctor about these medications          acetaminophen 500 MG tablet   Commonly known as:  TYLENOL       apixaban 2.5 MG tablet tablet   Commonly known as:  ELIQUIS   Take 1 tablet by mouth 2 (Two) Times a Day.       aspirin 325 MG tablet       atorvastatin 10 MG tablet   Commonly known as:  LIPITOR       buPROPion  MG 12 hr tablet   Commonly known as:  WELLBUTRIN SR       CALCIUM PLUS VITAMIN D3 PO       cyclobenzaprine 10 MG tablet   Commonly known as:  FLEXERIL       DULoxetine 60 MG capsule   Commonly known as:  CYMBALTA       furosemide 20 MG tablet   Commonly known as:  LASIX       gabapentin 400 MG capsule   Commonly known as:  NEURONTIN       glucose blood test strip   Commonly known as:  ACCU-CHEK SMARTVIEW   Accu-Chek SmartView In Vitro Strip TEST THREE TIMES DAILY  TO FOUR TIMES   DAILY AS " DIRECTED       HYDROcodone-acetaminophen 5-325 MG per tablet   Commonly known as:  NORCO   Take 1 tablet by mouth Every 12 (Twelve) Hours As Needed for Moderate Pain   .       insulin aspart 100 UNIT/ML solution pen-injector sc pen   Commonly known as:  NOVOLOG FLEXPEN   20 units sq before breakfast then 25 units sq before lunch and dinner       levothyroxine 75 MCG tablet   Commonly known as:  SYNTHROID, LEVOTHROID       lisinopril 5 MG tablet   Commonly known as:  PRINIVIL,ZESTRIL       melatonin 5 MG tablet tablet       metoprolol tartrate 12.5 MG half tablet   Commonly known as:  LOPRESSOR       omeprazole 40 MG capsule   Commonly known as:  priLOSEC       potassium chloride 10 MEQ CR tablet   Commonly known as:  K-DUR       thiamine 100 MG tablet   Commonly known as:  VITAMIN B-1       TRESIBA FLEXTOUCH 200 UNIT/ML solution pen-injector   Generic drug:  Insulin Degludec       VITAMELTS ENERGY VITAMIN B-12 1500 MCG tablet dispersible   Generic drug:  Cyanocobalamin           Past Medical History:   Diagnosis Date   • Allergic rhinitis    • Anxiety    • Arthritis    • Bell's palsy    • Cancer     REMOVED FROM ARM   • Depression    • Diabetes mellitus     LAST A1C 6   • Disease of thyroid gland    • GERD (gastroesophageal reflux disease)    • H/O blood clots     LEFT LEG 7 OR 8 YEARS AGO   • Hyperlipidemia    • Hypertension    • Kidney disease    • Left knee pain     scheduled for sx   • AARON (obstructive sleep apnea)     DOESNT WEAR REGULARLY   • PONV (postoperative nausea and vomiting)    • Stroke    • TIA (transient ischemic attack)     LAST TIA JULY 2017       Allergies   Allergen Reactions   • Morphine And Related    • Baclofen Anxiety     Panic attack, nightmares   • Codeine Itching and Rash   • Morphine Rash   • Penicillins Rash       Past Surgical History:   Procedure Laterality Date   • BACK SURGERY      HARDWARE   • CHOLECYSTECTOMY      OPEN   • COLONOSCOPY  2011    due for repeat in 2021   • HYSTERECTOMY       PARTIAL    • SPINE SURGERY     • UPPER GASTROINTESTINAL ENDOSCOPY  2014    gastritis.  done by dr. hastings       Family History   Problem Relation Age of Onset   • Lupus Mother    • Heart disease Other    • Hypertension Other        Social History     Social History   • Marital status:      Spouse name: N/A   • Number of children: N/A   • Years of education: N/A     Social History Main Topics   • Smoking status: Never Smoker   • Smokeless tobacco: Never Used   • Alcohol use No   • Drug use: No   • Sexual activity: Defer      Comment: EXERCISE - RARELY     Other Topics Concern   • None     Social History Narrative           Objective   Physical Exam   Constitutional: She is oriented to person, place, and time. She appears well-developed and well-nourished. No distress.   ED Triage Vitals:  Temp: 98 °F (36.7 °C) (11/03/17 1214)  Heart Rate: 87 (11/03/17 1214)  Resp: 18 (11/03/17 1214)  BP: 133/58 (11/03/17 1214)  SpO2: 87 % (11/03/17 1214)  Temp src: Oral (11/03/17 1214)  Heart Rate Source: n/a  Patient Position: n/a  BP Location: n/a  FiO2 (%): n/a    The patient's vitals were reviewed by me.  Unless otherwise noted they are within normal limits.  The patient's saturations are 87% on room air.     HENT:   Head: Normocephalic and atraumatic.   Right Ear: External ear normal.   Left Ear: External ear normal.   Nose: Nose normal.   Mouth/Throat: Oropharynx is clear and moist.   Eyes: Conjunctivae and EOM are normal. Pupils are equal, round, and reactive to light. Right eye exhibits no discharge. Left eye exhibits no discharge. No scleral icterus.   Neck: Normal range of motion. Neck supple. No JVD present. No tracheal deviation present. No thyromegaly present.   Cardiovascular: Normal rate, regular rhythm, normal heart sounds and intact distal pulses.  Exam reveals no gallop and no friction rub.    No murmur heard.  Pulmonary/Chest: Effort normal and breath sounds normal. No stridor. No respiratory distress.  She has no wheezes. She has no rales. She exhibits no tenderness.   Abdominal: Soft. Bowel sounds are normal. She exhibits no distension and no mass. There is no tenderness. There is no rebound and no guarding. No hernia.   Musculoskeletal: Normal range of motion. She exhibits no edema or deformity.   Lymphadenopathy:     She has no cervical adenopathy.   Neurological: She is alert and oriented to person, place, and time. No cranial nerve deficit. She exhibits normal muscle tone. Coordination normal.   Skin: Skin is warm and dry. No rash noted. She is not diaphoretic. No erythema. No pallor.   Psychiatric: Her behavior is normal.   Nursing note and vitals reviewed.      Procedures         ED Course  ED Course   Comment By Time   The laboratory values were reviewed by me.  The glucose is 106.  The BUN is 42.  The creatinine is 2.17.  The alkaline phosphatase is 1:30.  The GFR is 22.  The PT is 15.  The INR is 1.18.  The hemoglobin is 10.  The hematocrit is 31.  Karli was unremarkable. Chacorta Gan MD 11/03 1419   2:24 PM, 11/03/17:  The patient was reassessed.  She has no new complaints.  Her vital signs were reviewed and are stable.  Lung exam: Clear to auscultation equal bilaterally.    2:25 PM, 11/03/17:  I spoke with Dr. Mak, on-call for the hospitalists, he will admit the patient.  A VQ scan has been ordered and will be followed up by Dr. Mak on the floor.    2:25 PM, 11/03/17:  The patient's diagnosis of shortness of breath with hypoxia was discussed with her.  At this time no clear etiology of the hypoxia is been determined.  The patient has a history of having a normal echo.  Her troponin is normal.  Her flu test is negative and chest x-ray is unremarkable.  Her EKG does not show any acute changes.  Patient is on eliquis.  She does have a history of DVTs.  A VQ scan will be performed as part of the patient's inpatient workup.  The patient will be admitted in stable condition.    3:48 PM, 11/03/17:  EKG  was obtained at 1228.  EKG was interpreted by me at 12.9.  EKG shows a normal sinus rhythm with rate of 85.  There is a right bundle branch block.  There is no hypertrophy.  The HI and QT intervals are unremarkable.  There is no ectopy.  There is no acute ST elevation or depression.  The EKG today was unchanged from EKG compared to January 25, 2017.          MDM  XR Chest 1 View   ED Interpretation   The chest x-ray is read by Dr. Jacques Bates shows no active   disease.  Low lung volumes with mild basilar atelectasis.      Final Result   1. No active disease.   2. Low lung volumes with mild bibasilar atelectasis.       This report was finalized on 11/3/2017 1:03 PM by Dr. Jacques Bates MD.          NM Lung Ventilation Perfusion    (Results Pending)     Labs Reviewed   COMPREHENSIVE METABOLIC PANEL - Abnormal; Notable for the following:        Result Value    Glucose 106 (*)     BUN 42 (*)     Creatinine 2.17 (*)     Alkaline Phosphatase 130 (*)     eGFR Non  Amer 22 (*)     All other components within normal limits    Narrative:     The MDRD GFR formula is only valid for adults with stable renal function between ages 18 and 70.   PROTIME-INR - Abnormal; Notable for the following:     Protime 15.1 (*)     INR 1.18 (*)     All other components within normal limits    Narrative:     Therapeutic Ranges for INR: 2.0-3.0 (PT 20-30)                              2.5-3.5 (PT 25-34)   CBC WITH AUTO DIFFERENTIAL - Abnormal; Notable for the following:     RBC 3.13 (*)     Hemoglobin 10.1 (*)     Hematocrit 31.2 (*)     MCV 99.7 (*)     MCH 32.3 (*)     RDW 17.1 (*)     RDW-SD 62.5 (*)     MPV 12.1 (*)     Eosinophils, Absolute 0.31 (*)     Immature Grans, Absolute 0.04 (*)     All other components within normal limits   INFLUENZA ANTIGEN, RAPID - Normal   APTT - Normal    Narrative:     PTT = The equivalent PTT values for the therapeutic range of heparin levels at 0.1 to 0.7 U/ml are 53 to 110 seconds.  "  URINALYSIS W/ CULTURE IF INDICATED - Normal    Narrative:     Urine microscopic not indicated.   D-DIMER, QUANTITATIVE - Normal    Narrative:     Can be elevated in, but is not diagnostic for deep vein thrombosis (DVT) or pulmonary embolis (PE).  It is also elevated in other medical conditions.  Clinical correlation is required.  The negative cut-off value for the D-Dimer is 0.50 mcg FEU/mL for DVT and PE.   TSH - Normal   TROPONIN (IN-HOUSE) - Normal    Narrative:     Troponin T Reference Ranges:  Less than 0.03 ng/mL:    Negative for AMI  0.03 to 0.09 ng/mL:      Indeterminant for AMI  Greater than 0.09 ng/mL: Positive for AMI   BNP (IN-HOUSE)   CBC AND DIFFERENTIAL    Narrative:     The following orders were created for panel order CBC & Differential.  Procedure                               Abnormality         Status                     ---------                               -----------         ------                     CBC Auto Differential[[id number]]        Abnormal            Final result                 Please view results for these tests on the individual orders.         Final diagnoses:   None         ED Medications:  Medications   ipratropium-albuterol (DUO-NEB) nebulizer solution 3 mL (not administered)       New Medications:     Medication List      CONTINUE taking these medications          ACCU-CHEK FASTCLIX LANCETS misc   TEST 3-4 TIMES DAILY AS DIRECTED       ACCU-CHEK KENNETH SMARTVIEW w/Device kit       B-D SINGLE USE SWABS REGULAR pads       Needle (Disp) 30G X 1/2\" misc   Commonly known as:  BD DISP NEEDLES   To be used 3 times daily with Novolog Flexpen.       * ULTICARE MICRO PEN NEEDLES 32G X 4 MM misc   Generic drug:  Insulin Pen Needle       * ULTICARE PEN NEEDLES 29G X 12MM misc   Generic drug:  Insulin Pen Needle       * Insulin Pen Needle 31G X 6 MM misc   Commonly known as:  ULTICARE MINI PEN NEEDLES   To check blood sugar once daily       * Notice:  This list has 3 medication(s) that " are the same as other   medications prescribed for you. Read the directions carefully, and ask   your doctor or other care provider to review them with you.      ASK your doctor about these medications          acetaminophen 500 MG tablet   Commonly known as:  TYLENOL       apixaban 2.5 MG tablet tablet   Commonly known as:  ELIQUIS   Take 1 tablet by mouth 2 (Two) Times a Day.       aspirin 325 MG tablet       atorvastatin 10 MG tablet   Commonly known as:  LIPITOR       buPROPion  MG 12 hr tablet   Commonly known as:  WELLBUTRIN SR       CALCIUM PLUS VITAMIN D3 PO       cyclobenzaprine 10 MG tablet   Commonly known as:  FLEXERIL       DULoxetine 60 MG capsule   Commonly known as:  CYMBALTA       furosemide 20 MG tablet   Commonly known as:  LASIX       gabapentin 400 MG capsule   Commonly known as:  NEURONTIN       glucose blood test strip   Commonly known as:  ACCU-CHEK SMARTVIEW   Accu-Chek SmartView In Vitro Strip TEST THREE TIMES DAILY  TO FOUR TIMES   DAILY AS DIRECTED       HYDROcodone-acetaminophen 5-325 MG per tablet   Commonly known as:  NORCO   Take 1 tablet by mouth Every 12 (Twelve) Hours As Needed for Moderate Pain   .       insulin aspart 100 UNIT/ML solution pen-injector sc pen   Commonly known as:  NOVOLOG FLEXPEN   20 units sq before breakfast then 25 units sq before lunch and dinner       levothyroxine 75 MCG tablet   Commonly known as:  SYNTHROID, LEVOTHROID       lisinopril 5 MG tablet   Commonly known as:  PRINIVIL,ZESTRIL       melatonin 5 MG tablet tablet       metoprolol tartrate 12.5 MG half tablet   Commonly known as:  LOPRESSOR       omeprazole 40 MG capsule   Commonly known as:  priLOSEC       potassium chloride 10 MEQ CR tablet   Commonly known as:  K-DUR       thiamine 100 MG tablet   Commonly known as:  VITAMIN B-1       TRESIBA FLEXTOUCH 200 UNIT/ML solution pen-injector   Generic drug:  Insulin Degludec       VITAMELTS ENERGY VITAMIN B-12 1500 MCG tablet dispersible   Generic  "drug:  Cyanocobalamin           Stopped Medications:     Medication List      CONTINUE taking these medications          ACCU-CHEK FASTCLIX LANCETS misc   TEST 3-4 TIMES DAILY AS DIRECTED       ACCU-CHEK KENNETH SMARTVIEW w/Device kit       B-D SINGLE USE SWABS REGULAR pads       Needle (Disp) 30G X 1/2\" misc   Commonly known as:  BD DISP NEEDLES   To be used 3 times daily with Novolog Flexpen.       * ULTICARE MICRO PEN NEEDLES 32G X 4 MM misc   Generic drug:  Insulin Pen Needle       * ULTICARE PEN NEEDLES 29G X 12MM misc   Generic drug:  Insulin Pen Needle       * Insulin Pen Needle 31G X 6 MM misc   Commonly known as:  ULTICARE MINI PEN NEEDLES   To check blood sugar once daily       * Notice:  This list has 3 medication(s) that are the same as other   medications prescribed for you. Read the directions carefully, and ask   your doctor or other care provider to review them with you.      ASK your doctor about these medications          acetaminophen 500 MG tablet   Commonly known as:  TYLENOL       apixaban 2.5 MG tablet tablet   Commonly known as:  ELIQUIS   Take 1 tablet by mouth 2 (Two) Times a Day.       aspirin 325 MG tablet       atorvastatin 10 MG tablet   Commonly known as:  LIPITOR       buPROPion  MG 12 hr tablet   Commonly known as:  WELLBUTRIN SR       CALCIUM PLUS VITAMIN D3 PO       cyclobenzaprine 10 MG tablet   Commonly known as:  FLEXERIL       DULoxetine 60 MG capsule   Commonly known as:  CYMBALTA       furosemide 20 MG tablet   Commonly known as:  LASIX       gabapentin 400 MG capsule   Commonly known as:  NEURONTIN       glucose blood test strip   Commonly known as:  ACCU-CHEK SMARTVIEW   Accu-Chek SmartView In Vitro Strip TEST THREE TIMES DAILY  TO FOUR TIMES   DAILY AS DIRECTED       HYDROcodone-acetaminophen 5-325 MG per tablet   Commonly known as:  NORCO   Take 1 tablet by mouth Every 12 (Twelve) Hours As Needed for Moderate Pain   .       insulin aspart 100 UNIT/ML solution " pen-injector sc pen   Commonly known as:  NOVOLOG FLEXPEN   20 units sq before breakfast then 25 units sq before lunch and dinner       levothyroxine 75 MCG tablet   Commonly known as:  SYNTHROID, LEVOTHROID       lisinopril 5 MG tablet   Commonly known as:  PRINIVIL,ZESTRIL       melatonin 5 MG tablet tablet       metoprolol tartrate 12.5 MG half tablet   Commonly known as:  LOPRESSOR       omeprazole 40 MG capsule   Commonly known as:  priLOSEC       potassium chloride 10 MEQ CR tablet   Commonly known as:  K-DUR       thiamine 100 MG tablet   Commonly known as:  VITAMIN B-1       TRESIBA FLEXTOUCH 200 UNIT/ML solution pen-injector   Generic drug:  Insulin Degludec       VITAMELTS ENERGY VITAMIN B-12 1500 MCG tablet dispersible   Generic drug:  Cyanocobalamin             Final diagnoses:   Hypoxia            Chacorta Gan MD  11/03/17 1422       Chacorta Gan MD  11/03/17 1427       Chacorta Gan MD  11/03/17 1541

## 2017-11-03 NOTE — PLAN OF CARE
Problem: Patient Care Overview (Adult)  Goal: Plan of Care Review  Outcome: Ongoing (interventions implemented as appropriate)    11/03/17 1957   Coping/Psychosocial Response Interventions   Plan Of Care Reviewed With patient   Patient Care Overview   Progress improving       Goal: Adult Individualization and Mutuality  Outcome: Ongoing (interventions implemented as appropriate)    Problem: Respiratory Insufficiency (Adult)  Intervention: Provide Oxygenation/Ventilation/Perfusion Support    11/03/17 1957   Safety Interventions   Medication Review/Management medications reviewed   Activity   Activity Type activity adjusted per tolerance   Positioning   Head Of Bed (HOB) Position HOB elevated   Respiratory Interventions   Airway/Ventilation Management airway patency maintained

## 2017-11-03 NOTE — PLAN OF CARE
Problem: Respiratory Insufficiency (Adult)  Goal: Identify Related Risk Factors and Signs and Symptoms  Outcome: Ongoing (interventions implemented as appropriate)    11/03/17 0777   Respiratory Insufficiency   Related Risk Factors (Respiratory Insufficiency) activity intolerance;pain   Signs and Symptoms (Respiratory Insufficiency) other (see comments)  (patient has been place on O2, 2l)

## 2017-11-03 NOTE — PROGRESS NOTES
Subjective     Joya Singh is a 77 y.o. female, who presents with a chief complaint of   Chief Complaint   Patient presents with   • Pre-op Exam     Knee Surgery on 11/13/17   • Dizziness     Patient has had some dizziness and is getting worse.       HPI   The pt has been short of breath for about 10 days.  She tried to get an appt last week but I was on vacation and she wanted to wait to see me.  She was advised to go to the ER but she doesn't like going there so she has just waited.  She came into the office today with a sat of 78% on room air.  She was put on 2 liters of oxygen and given a nebulizer treatment with minimal improvement.  She feels extremely dizzy today.  Her bp is elevated.  She denies chest pain.  She says her shortness of breath has been worsening.  She has a bad cough that is non-productive.  She says she can't even walk in her house without feeling like she is going to pass out.  Currently in the office she says she feels like she is going to pass out.   She says she can't stand to get out of the wheelchair to walk for me in the office today.  No ffever.      She had a stress test that was low risk and echo that showed grade I diastolic dysfunction.    She has a hx of dvt's and had very lablie INR's.  She was switched to elequis and reports she is taking the med daily.      DM - she says her sugars have been ok.  130-150 range while fasting.       The following portions of the patient's history were reviewed and updated as appropriate: allergies, current medications, past family history, past medical history, past social history, past surgical history and problem list.    Allergies: Morphine and related; Baclofen; Codeine; Morphine; and Penicillins    Review of Systems   Constitutional: Negative.  Negative for fever.   HENT: Negative.    Eyes: Negative.    Respiratory: Positive for cough and shortness of breath.    Cardiovascular: Negative.    Gastrointestinal: Negative.    Endocrine: Negative.     Genitourinary: Negative.    Musculoskeletal: Negative.    Skin: Negative.    Allergic/Immunologic: Negative.    Neurological: Negative.    Hematological: Negative.    Psychiatric/Behavioral: Negative.    All other systems reviewed and are negative.      Objective     Wt Readings from Last 3 Encounters:   11/07/17 213 lb (96.6 kg)   11/03/17 213 lb 9.6 oz (96.9 kg)   10/05/17 212 lb (96.2 kg)     Temp Readings from Last 3 Encounters:   11/06/17 97.9 °F (36.6 °C) (Oral)   07/07/17 98 °F (36.7 °C) (Oral)   06/20/17 97.6 °F (36.4 °C) (Oral)     BP Readings from Last 3 Encounters:   11/07/17 106/62   11/06/17 113/65   10/05/17 146/89     Pulse Readings from Last 3 Encounters:   11/07/17 85   11/06/17 68   10/05/17 89     There is no height or weight on file to calculate BMI.  SpO2 Readings from Last 3 Encounters:   11/06/17 94%   10/05/17 94%   09/26/17 96%       Physical Exam   Constitutional: She is oriented to person, place, and time. She appears well-developed and well-nourished. No distress.   HENT:   Head: Normocephalic and atraumatic.   Right Ear: External ear normal.   Left Ear: External ear normal.   Nose: Nose normal.   Mouth/Throat: Oropharynx is clear and moist.   Eyes: Conjunctivae and EOM are normal. Pupils are equal, round, and reactive to light.   Neck: Normal range of motion. Neck supple.   Cardiovascular: Normal rate, regular rhythm, normal heart sounds and intact distal pulses.    Pulmonary/Chest: No respiratory distress. She has no wheezes.   Pt short of breath speaking complete sentences.  Decreased air movement bilat   Musculoskeletal:   Pt in wheelchair   Neurological: She is alert and oriented to person, place, and time.   Skin: Skin is warm and dry.   Psychiatric: She has a normal mood and affect. Her behavior is normal. Judgment and thought content normal.   Nursing note and vitals reviewed.      Results for orders placed or performed in visit on 10/09/17   Basic metabolic panel   Result Value  Ref Range    Glucose 217 (H) 65 - 99 mg/dL    BUN 25 (H) 8 - 23 mg/dL    Creatinine 1.73 (H) 0.57 - 1.00 mg/dL    Sodium 141 136 - 145 mmol/L    Potassium 5.3 (H) 3.5 - 5.2 mmol/L    Chloride 102 98 - 107 mmol/L    CO2 25.4 22.0 - 29.0 mmol/L    Calcium 9.2 8.8 - 10.5 mg/dL    eGFR Non African Amer 29 (L) >60 mL/min/1.73    BUN/Creatinine Ratio 14.5 7.0 - 25.0    Anion Gap 13.6 mmol/L   Protime-INR   Result Value Ref Range    Protime 14.5 12.1 - 15.0 Seconds    INR 1.12 (H) 0.90 - 1.10   APTT   Result Value Ref Range    PTT 32.0 24.3 - 38.1 seconds   CBC Auto Differential   Result Value Ref Range    WBC 7.27 4.80 - 10.80 10*3/mm3    RBC 3.03 (L) 4.20 - 5.40 10*6/mm3    Hemoglobin 10.0 (L) 12.0 - 16.0 g/dL    Hematocrit 30.6 (L) 37.0 - 47.0 %    .0 (H) 81.0 - 99.0 fL    MCH 33.0 (H) 27.0 - 31.0 pg    MCHC 32.7 31.0 - 37.0 g/dL    RDW 17.4 (H) 11.5 - 14.5 %    RDW-SD 64.4 (H) 37.0 - 54.0 fl    MPV 11.6 (H) 7.4 - 10.4 fL    Platelets 285 140 - 500 10*3/mm3    Neutrophil % 52.4 45.0 - 70.0 %    Lymphocyte % 36.3 20.0 - 45.0 %    Monocyte % 4.5 3.0 - 8.0 %    Eosinophil % 4.7 (H) 0.0 - 4.0 %    Basophil % 1.0 0.0 - 2.0 %    Immature Grans % 1.1 (H) 0.0 - 0.5 %    Neutrophils, Absolute 3.81 1.50 - 8.30 10*3/mm3    Lymphocytes, Absolute 2.64 0.60 - 4.80 10*3/mm3    Monocytes, Absolute 0.33 0.00 - 1.00 10*3/mm3    Eosinophils, Absolute 0.34 (H) 0.10 - 0.30 10*3/mm3    Basophils, Absolute 0.07 0.00 - 0.20 10*3/mm3    Immature Grans, Absolute 0.08 (H) 0.00 - 0.03 10*3/mm3    nRBC 0.0 0.0 - 0.0 /100 WBC       Assessment/Plan   Joya was seen today for pre-op exam and dizziness.    Diagnoses and all orders for this visit:    Hypoxia    Type 2 diabetes mellitus with diabetic autonomic neuropathy, with long-term current use of insulin    Obstructive sleep apnea syndrome      40 min spent with patient with acute hypoxia.  Oxygen placed on patient.  Pt sent to ER for further evaluation.   Case discussed with ER physician and  pt sent to ER for further evaluation.     Outpatient Medications Prior to Visit   Medication Sig Dispense Refill   • ACCU-CHEK FASTCLIX LANCETS misc TEST 3-4 TIMES DAILY AS DIRECTED 120 each 10   • Alcohol Swabs (B-D SINGLE USE SWABS REGULAR) pads      • apixaban (ELIQUIS) 2.5 MG tablet tablet Take 1 tablet by mouth 2 (Two) Times a Day. 60 tablet 2   • aspirin 325 MG tablet Take 325 mg by mouth Daily.     • atorvastatin (LIPITOR) 10 MG tablet Take 10 mg by mouth Every Night.     • Blood Glucose Monitoring Suppl (ACCU-CHEK KENNETH SMARTVIEW) W/DEVICE kit USE AS DIRECTED     • buPROPion SR (WELLBUTRIN SR) 150 MG 12 hr tablet Take 150 mg by mouth Every Night.     • Calcium Carb-Cholecalciferol (CALCIUM PLUS VITAMIN D3 PO) Take 1 tablet by mouth Every Morning.     • Cyanocobalamin (VITAMELTS ENERGY VITAMIN B-12) 1500 MCG tablet dispersible Take 1 tablet by mouth Every Morning.     • cyclobenzaprine (FLEXERIL) 10 MG tablet Take 10 mg by mouth 3 (Three) Times a Day As Needed for Muscle Spasms.     • DULoxetine (CYMBALTA) 60 MG capsule Take 60 mg by mouth Every Morning.     • furosemide (LASIX) 20 MG tablet Take 20 mg by mouth Every Morning.     • gabapentin (NEURONTIN) 400 MG capsule Take 400 mg by mouth 3 (Three) Times a Day. AND 2 CAPSULES AT BEDTIME     • HYDROcodone-acetaminophen (NORCO) 5-325 MG per tablet Take 1 tablet by mouth Every 12 (Twelve) Hours As Needed for Moderate Pain . 60 tablet 0   • insulin aspart (NOVOLOG FLEXPEN) 100 UNIT/ML solution pen-injector sc pen 20 units sq before breakfast then 25 units sq before lunch and dinner (Patient taking differently: 3 (Three) Times a Day With Meals. 20 units sq before breakfast then 25 units sq before lunch and dinner) 15 mL 6   • Insulin Pen Needle (ULTICARE MINI PEN NEEDLES) 31G X 6 MM misc To check blood sugar once daily 100 each 3   • levothyroxine (SYNTHROID, LEVOTHROID) 75 MCG tablet Take 75 mcg by mouth Every Morning.     • melatonin 5 MG tablet tablet Take 5 mg  "by mouth At Night As Needed.     • metoprolol tartrate (LOPRESSOR) 12.5 MG half tablet Take 12.5 mg by mouth 2 (Two) Times a Day.     • Needle, Disp, (BD DISP NEEDLES) 30G X 1/2\" misc To be used 3 times daily with Novolog Flexpen. 100 each 5   • omeprazole (priLOSEC) 40 MG capsule Take 40 mg by mouth Every Morning.     • potassium chloride (K-DUR) 10 MEQ CR tablet Take 10 mEq by mouth Every Morning.     • thiamine (VITAMIN B-1) 100 MG tablet Take 100 mg by mouth Daily.     • TRESIBA FLEXTOUCH 200 UNIT/ML solution pen-injector Inject 80 Units under the skin Every Night.     • ULTICARE MICRO PEN NEEDLES 32G X 4 MM misc      • ULTICARE PEN NEEDLES 29G X 12MM misc      • acetaminophen (TYLENOL) 500 MG tablet Take 1,000 mg by mouth Every 4 (Four) Hours As Needed.     • glucose blood (ACCU-CHEK SMARTVIEW) test strip Accu-Chek SmartView In Vitro Strip TEST THREE TIMES DAILY  TO FOUR TIMES DAILY AS DIRECTED 300 each 3   • lisinopril (PRINIVIL,ZESTRIL) 5 MG tablet Take 5 mg by mouth Every Morning.       No facility-administered medications prior to visit.      No orders of the defined types were placed in this encounter.    There are no discontinued medications.      Return for Recheck after ER evaluation.  "

## 2017-11-03 NOTE — H&P
Patient Name: Joya Singh  :1940  77 y.o.    Date of Hospital Visit: 11/3/2017 12:11 PM  5:39 PM    Encounter Provider: Serafin Montilla MD    Place of Service: [facility].  [facility] group.    Patient Care Team:  Chari Mercado MD as PCP - General  Chari Mercado MD as PCP - Family Medicine  Christos Rob MD as Surgeon (General Surgery)      Chief complaint:short of air    History of Present Illness:  78 yo white female with a long list of medical problems with 10 days of SOA  78% on RA today  No h/o copd  Weeks of nonproductive cough  No fever  No chest pain.  Recent echo ok  Recent stress test; small area of ischemia          Past Medical History:   Diagnosis Date   • Allergic rhinitis    • Anxiety    • Arthritis    • Bell's palsy    • Cancer     REMOVED FROM ARM   • Depression    • Diabetes mellitus     LAST A1C 6   • Disease of thyroid gland    • GERD (gastroesophageal reflux disease)    • H/O blood clots     LEFT LEG 7 OR 8 YEARS AGO   • Hyperlipidemia    • Hypertension    • Kidney disease    • Left knee pain     scheduled for sx   • AARON (obstructive sleep apnea)     DOESNT WEAR REGULARLY   • PONV (postoperative nausea and vomiting)    • Stroke    • TIA (transient ischemic attack)     LAST TIA 2017       Past Surgical History:   Procedure Laterality Date   • BACK SURGERY      HARDWARE   • CHOLECYSTECTOMY      OPEN   • COLONOSCOPY      due for repeat in    • HYSTERECTOMY      PARTIAL    • SPINE SURGERY     • UPPER GASTROINTESTINAL ENDOSCOPY      gastritis.  done by dr. hastings         Prior to Admission medications    Medication Sig Start Date End Date Taking? Authorizing Provider   ACCU-CHEK FASTCLIX LANCETS misc TEST 3-4 TIMES DAILY AS DIRECTED 17   Chari Mercado MD   acetaminophen (TYLENOL) 500 MG tablet Take 1,000 mg by mouth Every 4 (Four) Hours As Needed. 14   Historical  Provider, MD   Alcohol Swabs (B-D SINGLE USE SWABS REGULAR) pads  7/7/14   Historical Provider, MD   apixaban (ELIQUIS) 2.5 MG tablet Take 1 tablet by mouth 2 (Two) Times a Day. 8/18/17   Chari Mercado MD   aspirin 325 MG tablet Take 325 mg by mouth Daily.    Historical Provider, MD   atorvastatin (LIPITOR) 10 MG tablet Take 10 mg by mouth Every Night.    Historical Provider, MD   Blood Glucose Monitoring Suppl (ACCU-CHEK KENNETH SMARTVIEW) W/DEVICE kit USE AS DIRECTED 6/20/14   Chari Mercado MD   bupropion SR (WELLBUTRIN SR) 150 MG 12 hr tablet Take 150 mg by mouth Every Night. 12/7/16   Historical Provider, MD   Calcium Carb-Cholecalciferol (CALCIUM PLUS VITAMIN D3 PO) Take 1 tablet by mouth Every Morning. 5/20/14   Historical Provider, MD   Cyanocobalamin (VITAMELTS ENERGY VITAMIN B-12) 1500 MCG tablet dispersible Take 1 tablet by mouth Every Morning.    Historical Provider, MD   cyclobenzaprine (FLEXERIL) 10 MG tablet Take 10 mg by mouth 3 (Three) Times a Day As Needed for Muscle Spasms.    Historical Provider, MD   DULoxetine (CYMBALTA) 60 MG capsule Take 60 mg by mouth Every Morning.    Historical Provider, MD   furosemide (LASIX) 20 MG tablet Take 20 mg by mouth Every Morning.    Historical Provider, MD   gabapentin (NEURONTIN) 400 MG capsule Take 400 mg by mouth 3 (Three) Times a Day.    Historical Provider, MD   glucose blood (ACCU-CHEK SMARTVIEW) test strip Accu-Chek SmartView In Vitro Strip TEST THREE TIMES DAILY  TO FOUR TIMES DAILY AS DIRECTED 8/17/16   Chari Mercado MD   HYDROcodone-acetaminophen (NORCO) 5-325 MG per tablet Take 1 tablet by mouth Every 12 (Twelve) Hours As Needed for Moderate Pain . 10/4/17   Chari Mercado MD   insulin aspart (NOVOLOG FLEXPEN) 100 UNIT/ML solution pen-injector sc pen 20 units sq before breakfast then 25 units sq before lunch and dinner  Patient taking differently: 3 (Three) Times a Day With Meals. 20 units sq before breakfast  "then 25 units sq before lunch and dinner 9/26/17   Chari Mercado MD   Insulin Pen Needle (ULTICARE MINI PEN NEEDLES) 31G X 6 MM misc To check blood sugar once daily 11/1/17   Chari Mercado MD   levothyroxine (SYNTHROID, LEVOTHROID) 75 MCG tablet Take 75 mcg by mouth Every Morning.    Historical Provider, MD   lisinopril (PRINIVIL,ZESTRIL) 5 MG tablet Take 5 mg by mouth Every Morning.    Historical Provider, MD   melatonin 5 MG tablet Take 5 mg by mouth At Night As Needed.    Historical Provider, MD   metoprolol tartrate (LOPRESSOR) 12.5 MG half tablet Take 25 mg by mouth 2 (Two) Times a Day.    Historical Provider, MD   Needle Disp, (BD DISP NEEDLES) 30G X 1/2\" misc To be used 3 times daily with Novolog Flexpen. 8/4/16   Chari Mercado MD   omeprazole (PriLOSEC) 40 MG capsule Take 40 mg by mouth Every Morning.    Historical Provider, MD   potassium chloride (K-DUR) 10 MEQ CR tablet Take 10 mEq by mouth Every Morning.    Historical Provider, MD   thiamine (VITAMIN B-1) 100 MG tablet Take 100 mg by mouth Daily.    Historical Provider, MD   TRESIBA FLEXTOUCH 200 UNIT/ML solution pen-injector Inject 80 Units under the skin Every Night. 4/19/17   Historical Provider, MD   ULTICARE MICRO PEN NEEDLES 32G X 4 MM misc  11/29/16   Historical Provider, MD   ULTICARE PEN NEEDLES 29G X 12MM misc  4/17/17   Historical Provider, MD       Allergies   Allergen Reactions   • Morphine And Related    • Baclofen Anxiety     Panic attack, nightmares   • Codeine Itching and Rash   • Morphine Rash   • Penicillins Rash       Social History     Social History   • Marital status:      Spouse name: N/A   • Number of children: N/A   • Years of education: N/A     Social History Main Topics   • Smoking status: Never Smoker   • Smokeless tobacco: Never Used   • Alcohol use No   • Drug use: No   • Sexual activity: Defer      Comment: EXERCISE - RARELY     Other Topics Concern   • None     Social History " "Narrative       Family History   Problem Relation Age of Onset   • Lupus Mother    • Heart disease Other    • Hypertension Other        REVIEW OF SYSTEMS:   All other systems reviewed and negative.       Objective:     Vitals:    11/03/17 1417 11/03/17 1451 11/03/17 1525 11/03/17 1557   BP: 141/72  165/58 125/56   BP Location:   Right arm Left arm   Patient Position:   Lying Lying   Pulse: 79 86 85 82   Resp:  18 20 18   Temp:    96.9 °F (36.1 °C)   TempSrc:    Oral   SpO2: 95% 95% 96% 100%   Weight:    213 lb 9.6 oz (96.9 kg)   Height:    62\" (157.5 cm)     Body mass index is 39.07 kg/(m^2).    Constitutional: Patient is oriented to person, place, and time. Patient appears well-developed. Does not appear ill.   HENT:   Head: Normocephalic and atraumatic. Head is without contusion.   Right and Left Ear: Hearing normal to conversational tones. No visible drainage.    Nose: No epistaxis. External nose normal.   Eyes: Lids are normal. Pupils are equal, round, and reactive to light. Right and left eye exhibit no exudate. Conjunctiva has no hemorrhage.   Neck: No JVD present. No carotid bruit. No tracheal deviation present. No thyroid mass and no thyromegaly.  Cardiovascular: Normal rate, regular rhythm and normal heart sounds.    Pulses:present in radial and DP  Pulmonary/Chest: Effort normal and breath sounds normal. lungs sound ok. But poor results on bedside IS  Abdominal: Soft. Normal appearance and bowel sounds are normal. There is no tenderness.   Musculoskeletal: Normal range of motion.   Neurological: Patient is alert and oriented to person, place, and time.  Normal strength.   Skin: Skin is warm, dry and intact. No rash noted.   Psychiatric:Patient has a normal mood and affect. Patient behavior is normal. Thought content normal.       Lab Review:   Lab Results   Component Value Date    WBC 8.26 11/03/2017    HGB 10.1 (L) 11/03/2017    HCT 31.2 (L) 11/03/2017    MCV 99.7 (H) 11/03/2017     11/03/2017      "   Lab Results   Component Value Date    TROPONINT <0.010 11/03/2017        Lab Results   Component Value Date    GLUCOSE 106 (H) 11/03/2017    BUN 42 (H) 11/03/2017    CREATININE 2.17 (H) 11/03/2017    EGFRIFNONA 22 (L) 11/03/2017    EGFRIFAFRI 31 (L) 10/04/2017    BCR 19.4 11/03/2017    CO2 27.9 11/03/2017    CALCIUM 9.3 11/03/2017    PROTENTOTREF 6.6 09/26/2017    ALBUMIN 3.70 11/03/2017    LABIL2 1.1 11/03/2017    AST 8 11/03/2017    ALT 6 11/03/2017       I personally viewed:  the patient's EKG/Telemetry data, the current radiology reports, the data from the emergency room, and reviewed all pertinent data in our EMR.    Imaging Results (last 24 hours)     Procedure Component Value Units Date/Time    XR Chest 1 View [[id number]] Collected:  11/03/17 1303     Updated:  11/03/17 1321    Narrative:       CHEST X-RAY, 11/03/2017:     HISTORY:   77-year-old female in the ED complaining of two week history of  shortness of air.     TECHNIQUE:  AP portable upright chest x-ray.     FINDINGS:  Low lung volumes. Mild basilar atelectasis. The lungs appear clear.  Heart size and pulmonary vascularity are normal. No visible pleural  effusion.       Impression:       1. No active disease.  2. Low lung volumes with mild bibasilar atelectasis.     This report was finalized on 11/3/2017 1:03 PM by Dr. Jacques Bates MD.       NM Lung Ventilation Perfusion [[id number]] Collected:  11/03/17 1623     Updated:  11/03/17 1628    Narrative:       VENTILATION/PERFUSION LUNG SCAN, 11/03/2017:     HISTORY:   77-year-old female in the ED 2 1/2 week history of worsening chronic  shortness of air. D-dimer is normal.     DOSE:   *  27.2 mCi technetium labeled DTPA inhaled in aerosol.  *  5.9 mCi technetium labeled MAA administered intravenously.     COMPARISON:   Chest x-ray today.     FINDINGS:   Mildly heterogeneous distribution of inhaled tracer in both lungs on  ventilation images. Perfusion images are normal. No unmatched  perfusion  defect is identified to suggest pulmonary embolism.       Impression:       Low probability of pulmonary embolism.     This report was finalized on 11/3/2017 4:26 PM by Dr. Jacques Bates MD.              Assessment and Plan:       1.new onset hypoxia, 78% on room air.  Cxr -  VQ scan -  BNP -  Troponin -  Influenza -  Recent normal echo and nuclear cardiac stress test.  Check viral panel  Pulm consult  CT chest s contrast.  Am BNP  Am troponin    2.  Insulin requiring DM2. Last A1c 8.6 in sept    3. CKD 4    4. On ELIQUIS for chronic DVT    5. AARON    6. Chronic anemia    7.  Chronic light headedness, cause  Will check orthostatics    Serafin Montilla MD  11/03/17  5:39 PM

## 2017-11-04 LAB
ALBUMIN SERPL-MCNC: 3.9 G/DL (ref 3.5–5.2)
ALBUMIN/GLOB SERPL: 1.2 G/DL
ALP SERPL-CCNC: 131 U/L (ref 40–129)
ALT SERPL W P-5'-P-CCNC: 7 U/L (ref 5–33)
ANION GAP SERPL CALCULATED.3IONS-SCNC: 13.1 MMOL/L
ARTERIAL PATENCY WRIST A: POSITIVE
AST SERPL-CCNC: 10 U/L (ref 5–32)
ATMOSPHERIC PRESS: 742 MMHG
B PERT DNA SPEC QL NAA+PROBE: NOT DETECTED
BASE EXCESS BLDA CALC-SCNC: 3.1 MMOL/L (ref 0–2)
BASOPHILS # BLD AUTO: 0.03 10*3/MM3 (ref 0–0.2)
BASOPHILS NFR BLD AUTO: 0.4 % (ref 0–2)
BDY SITE: ABNORMAL
BILIRUB SERPL-MCNC: 0.4 MG/DL (ref 0.2–1.2)
BODY TEMPERATURE: 37 C
BUN BLD-MCNC: 48 MG/DL (ref 8–23)
BUN/CREAT SERPL: 21.9 (ref 7–25)
C PNEUM DNA NPH QL NAA+NON-PROBE: NOT DETECTED
CALCIUM SPEC-SCNC: 9.2 MG/DL (ref 8.8–10.5)
CHLORIDE SERPL-SCNC: 101 MMOL/L (ref 98–107)
CO2 SERPL-SCNC: 24.9 MMOL/L (ref 22–29)
CREAT BLD-MCNC: 2.19 MG/DL (ref 0.57–1)
DEPRECATED RDW RBC AUTO: 60.7 FL (ref 37–54)
EOSINOPHIL # BLD AUTO: 0.01 10*3/MM3 (ref 0.1–0.3)
EOSINOPHIL NFR BLD AUTO: 0.1 % (ref 0–4)
ERYTHROCYTE [DISTWIDTH] IN BLOOD BY AUTOMATED COUNT: 16.8 % (ref 11.5–14.5)
FLUAV H1 2009 PAND RNA NPH QL NAA+PROBE: NOT DETECTED
FLUAV H1 HA GENE NPH QL NAA+PROBE: NOT DETECTED
FLUAV H3 RNA NPH QL NAA+PROBE: NOT DETECTED
FLUAV SUBTYP SPEC NAA+PROBE: NOT DETECTED
FLUBV RNA ISLT QL NAA+PROBE: NOT DETECTED
GAS FLOW AIRWAY: 2 LPM
GFR SERPL CREATININE-BSD FRML MDRD: 22 ML/MIN/1.73
GLOBULIN UR ELPH-MCNC: 3.2 GM/DL
GLUCOSE BLD-MCNC: 379 MG/DL (ref 65–99)
GLUCOSE BLD-MCNC: 482 MG/DL (ref 65–99)
GLUCOSE BLD-MCNC: 501 MG/DL (ref 65–99)
GLUCOSE BLDC GLUCOMTR-MCNC: 324 MG/DL (ref 70–130)
GLUCOSE BLDC GLUCOMTR-MCNC: 327 MG/DL (ref 70–130)
GLUCOSE BLDC GLUCOMTR-MCNC: 447 MG/DL (ref 70–130)
GLUCOSE BLDC GLUCOMTR-MCNC: 476 MG/DL (ref 70–130)
HADV DNA SPEC NAA+PROBE: NOT DETECTED
HCO3 BLDA-SCNC: 19.8 MMOL/L (ref 20–26)
HCOV 229E RNA SPEC QL NAA+PROBE: NOT DETECTED
HCOV HKU1 RNA SPEC QL NAA+PROBE: NOT DETECTED
HCOV NL63 RNA SPEC QL NAA+PROBE: NOT DETECTED
HCOV OC43 RNA SPEC QL NAA+PROBE: NOT DETECTED
HCT VFR BLD AUTO: 32.4 % (ref 37–47)
HGB BLD-MCNC: 10.5 G/DL (ref 12–16)
HMPV RNA NPH QL NAA+NON-PROBE: NOT DETECTED
HPIV1 RNA SPEC QL NAA+PROBE: NOT DETECTED
HPIV2 RNA SPEC QL NAA+PROBE: NOT DETECTED
HPIV3 RNA NPH QL NAA+PROBE: NOT DETECTED
HPIV4 P GENE NPH QL NAA+PROBE: NOT DETECTED
IMM GRANULOCYTES # BLD: 0.52 10*3/MM3 (ref 0–0.03)
IMM GRANULOCYTES NFR BLD: 6.3 % (ref 0–0.5)
LYMPHOCYTES # BLD AUTO: 0.87 10*3/MM3 (ref 0.6–4.8)
LYMPHOCYTES NFR BLD AUTO: 10.5 % (ref 20–45)
M PNEUMO IGG SER IA-ACNC: NOT DETECTED
MCH RBC QN AUTO: 31.9 PG (ref 27–31)
MCHC RBC AUTO-ENTMCNC: 32.4 G/DL (ref 31–37)
MCV RBC AUTO: 98.5 FL (ref 81–99)
MODALITY: ABNORMAL
MONOCYTES # BLD AUTO: 0.03 10*3/MM3 (ref 0–1)
MONOCYTES NFR BLD AUTO: 0.4 % (ref 3–8)
NEUTROPHILS # BLD AUTO: 6.86 10*3/MM3 (ref 1.5–8.3)
NEUTROPHILS NFR BLD AUTO: 82.3 % (ref 45–70)
NRBC BLD MANUAL-RTO: 0 /100 WBC (ref 0–0)
NT-PROBNP SERPL-MCNC: 214 PG/ML (ref 0–1800)
PCO2 BLDA: 37.7 MM HG (ref 35–45)
PH BLDA: 7.41 PH UNITS (ref 7.35–7.45)
PLAT MORPH BLD: NORMAL
PLATELET # BLD AUTO: 323 10*3/MM3 (ref 140–500)
PMV BLD AUTO: 12.1 FL (ref 7.4–10.4)
PO2 BLDA: 87.7 MM HG (ref 83–108)
POTASSIUM BLD-SCNC: 5.5 MMOL/L (ref 3.5–5.2)
PROT SERPL-MCNC: 7.1 G/DL (ref 6–8.5)
RBC # BLD AUTO: 3.29 10*6/MM3 (ref 4.2–5.4)
RBC MORPH BLD: NORMAL
RHINOVIRUS RNA SPEC NAA+PROBE: NOT DETECTED
RSV RNA NPH QL NAA+NON-PROBE: NOT DETECTED
SAO2 % BLDCOA: 97 % (ref 94–99)
SODIUM BLD-SCNC: 139 MMOL/L (ref 136–145)
TROPONIN T SERPL-MCNC: <0.01 NG/ML (ref 0–0.03)
TROPONIN T SERPL-MCNC: <0.01 NG/ML (ref 0–0.03)
VENTILATOR MODE: ABNORMAL
WBC MORPH BLD: NORMAL
WBC NRBC COR # BLD: 8.32 10*3/MM3 (ref 4.8–10.8)

## 2017-11-04 PROCEDURE — 63710000001 INSULIN ASPART PER 5 UNITS: Performed by: HOSPITALIST

## 2017-11-04 PROCEDURE — 94799 UNLISTED PULMONARY SVC/PX: CPT

## 2017-11-04 PROCEDURE — 94620 HC PULMONARY STRESS TEST SIMPLE: CPT

## 2017-11-04 PROCEDURE — 82947 ASSAY GLUCOSE BLOOD QUANT: CPT | Performed by: INTERNAL MEDICINE

## 2017-11-04 PROCEDURE — 99232 SBSQ HOSP IP/OBS MODERATE 35: CPT | Performed by: HOSPITALIST

## 2017-11-04 PROCEDURE — 82947 ASSAY GLUCOSE BLOOD QUANT: CPT | Performed by: HOSPITALIST

## 2017-11-04 PROCEDURE — 63710000001 INSULIN ASPART PER 5 UNITS: Performed by: INTERNAL MEDICINE

## 2017-11-04 PROCEDURE — 25010000002 METHYLPREDNISOLONE PER 40 MG

## 2017-11-04 PROCEDURE — 83880 ASSAY OF NATRIURETIC PEPTIDE: CPT | Performed by: FAMILY MEDICINE

## 2017-11-04 PROCEDURE — 82962 GLUCOSE BLOOD TEST: CPT

## 2017-11-04 PROCEDURE — 85025 COMPLETE CBC W/AUTO DIFF WBC: CPT | Performed by: INTERNAL MEDICINE

## 2017-11-04 PROCEDURE — 80053 COMPREHEN METABOLIC PANEL: CPT | Performed by: INTERNAL MEDICINE

## 2017-11-04 PROCEDURE — 36600 WITHDRAWAL OF ARTERIAL BLOOD: CPT

## 2017-11-04 PROCEDURE — 25010000002 METHYLPREDNISOLONE PER 125 MG: Performed by: INTERNAL MEDICINE

## 2017-11-04 PROCEDURE — 82803 BLOOD GASES ANY COMBINATION: CPT

## 2017-11-04 PROCEDURE — 85007 BL SMEAR W/DIFF WBC COUNT: CPT | Performed by: INTERNAL MEDICINE

## 2017-11-04 PROCEDURE — 84484 ASSAY OF TROPONIN QUANT: CPT | Performed by: INTERNAL MEDICINE

## 2017-11-04 RX ORDER — METHYLPREDNISOLONE SODIUM SUCCINATE 40 MG/ML
INJECTION, POWDER, LYOPHILIZED, FOR SOLUTION INTRAMUSCULAR; INTRAVENOUS
Status: COMPLETED
Start: 2017-11-04 | End: 2017-11-04

## 2017-11-04 RX ORDER — NICOTINE POLACRILEX 4 MG
15 LOZENGE BUCCAL
Status: DISCONTINUED | OUTPATIENT
Start: 2017-11-04 | End: 2017-11-06 | Stop reason: HOSPADM

## 2017-11-04 RX ORDER — DEXTROSE MONOHYDRATE 25 G/50ML
25 INJECTION, SOLUTION INTRAVENOUS
Status: DISCONTINUED | OUTPATIENT
Start: 2017-11-04 | End: 2017-11-06 | Stop reason: HOSPADM

## 2017-11-04 RX ORDER — DULOXETIN HYDROCHLORIDE 30 MG/1
CAPSULE, DELAYED RELEASE ORAL
Status: COMPLETED
Start: 2017-11-04 | End: 2017-11-04

## 2017-11-04 RX ORDER — CODEINE PHOSPHATE AND GUAIFENESIN 10; 100 MG/5ML; MG/5ML
5 SOLUTION ORAL EVERY 6 HOURS PRN
Status: DISCONTINUED | OUTPATIENT
Start: 2017-11-04 | End: 2017-11-06 | Stop reason: HOSPADM

## 2017-11-04 RX ORDER — METHYLPREDNISOLONE SODIUM SUCCINATE 125 MG/2ML
60 INJECTION, POWDER, LYOPHILIZED, FOR SOLUTION INTRAMUSCULAR; INTRAVENOUS EVERY 6 HOURS
Status: DISCONTINUED | OUTPATIENT
Start: 2017-11-04 | End: 2017-11-04

## 2017-11-04 RX ORDER — POTASSIUM CHLORIDE 750 MG/1
TABLET, EXTENDED RELEASE ORAL
Status: DISPENSED
Start: 2017-11-04 | End: 2017-11-04

## 2017-11-04 RX ADMIN — LEVOTHYROXINE SODIUM 75 MCG: 75 TABLET ORAL at 06:29

## 2017-11-04 RX ADMIN — DULOXETINE HYDROCHLORIDE 60 MG: 60 CAPSULE, DELAYED RELEASE ORAL at 06:31

## 2017-11-04 RX ADMIN — METOPROLOL TARTRATE 25 MG: 25 TABLET, FILM COATED ORAL at 18:01

## 2017-11-04 RX ADMIN — ATORVASTATIN CALCIUM 10 MG: 10 TABLET, FILM COATED ORAL at 21:12

## 2017-11-04 RX ADMIN — METOPROLOL TARTRATE 25 MG: 25 TABLET, FILM COATED ORAL at 08:20

## 2017-11-04 RX ADMIN — GABAPENTIN 400 MG: 400 CAPSULE ORAL at 21:12

## 2017-11-04 RX ADMIN — ASPIRIN 325 MG: 325 TABLET, COATED ORAL at 08:20

## 2017-11-04 RX ADMIN — INSULIN ASPART 9 UNITS: 100 INJECTION, SOLUTION INTRAVENOUS; SUBCUTANEOUS at 17:57

## 2017-11-04 RX ADMIN — APIXABAN 2.5 MG: 2.5 TABLET, FILM COATED ORAL at 08:20

## 2017-11-04 RX ADMIN — CYANOCOBALAMIN TAB 1000 MCG 1000 MCG: 1000 TAB at 08:20

## 2017-11-04 RX ADMIN — PANTOPRAZOLE SODIUM 40 MG: 40 TABLET, DELAYED RELEASE ORAL at 06:31

## 2017-11-04 RX ADMIN — Medication 1 TABLET: at 08:20

## 2017-11-04 RX ADMIN — FUROSEMIDE 20 MG: 20 TABLET ORAL at 06:29

## 2017-11-04 RX ADMIN — Medication 1 TABLET: at 18:01

## 2017-11-04 RX ADMIN — GABAPENTIN 400 MG: 400 CAPSULE ORAL at 15:15

## 2017-11-04 RX ADMIN — GUAIFENESIN AND CODEINE PHOSPHATE 5 ML: 100; 10 SOLUTION ORAL at 17:57

## 2017-11-04 RX ADMIN — METHYLPREDNISOLONE SODIUM SUCCINATE 60 MG: 125 INJECTION, POWDER, FOR SOLUTION INTRAMUSCULAR; INTRAVENOUS at 02:43

## 2017-11-04 RX ADMIN — INSULIN ASPART 8 UNITS: 100 INJECTION, SOLUTION INTRAVENOUS; SUBCUTANEOUS at 21:13

## 2017-11-04 RX ADMIN — IPRATROPIUM BROMIDE AND ALBUTEROL SULFATE 3 ML: .5; 3 SOLUTION RESPIRATORY (INHALATION) at 19:49

## 2017-11-04 RX ADMIN — Medication: at 21:14

## 2017-11-04 RX ADMIN — DOCUSATE SODIUM 100 MG: 100 CAPSULE, LIQUID FILLED ORAL at 18:01

## 2017-11-04 RX ADMIN — Medication 100 MG: at 08:20

## 2017-11-04 RX ADMIN — INSULIN ASPART 15 UNITS: 100 INJECTION, SOLUTION INTRAVENOUS; SUBCUTANEOUS at 08:09

## 2017-11-04 RX ADMIN — DOCUSATE SODIUM 100 MG: 100 CAPSULE, LIQUID FILLED ORAL at 08:20

## 2017-11-04 RX ADMIN — ACETAMINOPHEN 650 MG: 325 TABLET, FILM COATED ORAL at 14:37

## 2017-11-04 RX ADMIN — INSULIN ASPART 7 UNITS: 100 INJECTION, SOLUTION INTRAVENOUS; SUBCUTANEOUS at 11:52

## 2017-11-04 RX ADMIN — METHYLPREDNISOLONE SODIUM SUCCINATE 60 MG: 125 INJECTION, POWDER, FOR SOLUTION INTRAMUSCULAR; INTRAVENOUS at 08:21

## 2017-11-04 RX ADMIN — INSULIN ASPART 20 UNITS: 100 INJECTION, SOLUTION INTRAVENOUS; SUBCUTANEOUS at 17:41

## 2017-11-04 RX ADMIN — DULOXETINE HYDROCHLORIDE 60 MG: 30 CAPSULE, DELAYED RELEASE ORAL at 06:31

## 2017-11-04 RX ADMIN — IPRATROPIUM BROMIDE AND ALBUTEROL SULFATE 3 ML: .5; 3 SOLUTION RESPIRATORY (INHALATION) at 11:56

## 2017-11-04 RX ADMIN — Medication 5 MG: at 21:13

## 2017-11-04 RX ADMIN — LISINOPRIL 5 MG: 5 TABLET ORAL at 06:30

## 2017-11-04 RX ADMIN — GABAPENTIN 400 MG: 400 CAPSULE ORAL at 08:30

## 2017-11-04 RX ADMIN — METHYLPREDNISOLONE SODIUM SUCCINATE 60 MG: 40 INJECTION, POWDER, FOR SOLUTION INTRAMUSCULAR; INTRAVENOUS at 02:43

## 2017-11-04 RX ADMIN — INSULIN ASPART 20 UNITS: 100 INJECTION, SOLUTION INTRAVENOUS; SUBCUTANEOUS at 12:39

## 2017-11-04 RX ADMIN — APIXABAN 2.5 MG: 2.5 TABLET, FILM COATED ORAL at 18:01

## 2017-11-04 RX ADMIN — IPRATROPIUM BROMIDE AND ALBUTEROL SULFATE 3 ML: .5; 3 SOLUTION RESPIRATORY (INHALATION) at 08:16

## 2017-11-04 NOTE — PROGRESS NOTES
"Hospitalist Team      Patient Care Team:  Chari Mercado MD as PCP - General  Chari Mercado MD as PCP - Family Medicine  Christos Rob MD as Surgeon (General Surgery)  German Wylie MD as Surgeon (Orthopedic Surgery)        Chief Complaint:  SOA and cough    Subjective    Interval History and ROS:     Patient States  I have not improved  Patient Complaints: SOA and cough and abdominal pain and nothing helps  She is truly miserable.      Objective    Vital Signs  Temp:  [96.9 °F (36.1 °C)-98 °F (36.7 °C)] 97 °F (36.1 °C)  Heart Rate:  [73-92] 87  Resp:  [18-20] 20  BP: (115-185)/(52-92) 134/73  Oxygen Therapy  SpO2: 94 %  Pulse Oximetry Type: Continuous  O2 Device: room air  Flow (L/min): 2}  Flowsheet Rows         First Filed Value    Admission Height  62.5\" (158.8 cm) Documented at 11/03/2017 1214    Admission Weight  211 lb 5 oz (95.9 kg) Documented at 11/03/2017 1214        Body mass index is 39.07 kg/(m^2).      Physical Exam:    Physical Exam   Constitutional: Patient appears obese and coughing constantly while I am in the room.  Holds her abdomen when she coughs.   HEENT:   Head: Normocephalic and atraumatic.   Eyes:  Pupils are equal, round, and reactive to light. EOM are intact. Sclera are anicteric and non-injected.  Mouth and Throat: Patient has moist mucous membranes. Oropharynx is clear of any erythema or exudate.     Neck: Neck supple. No JVD present. No thyromegaly present. No lymphadenopathy present.  Cardiovascular: Regular rate, regular rhythm, S1 normal and S2 normal.  Exam reveals no gallop and no friction rub.  No murmur heard.  Pulmonary/Chest: Lungs are clear to auscultation bilaterally. Coughs continuously.  Deep breathing causes coughing. No respiratory distress. No wheezes. No rhonchi. No rales. Sometimes with coughing it sounds moist.  She brings up very little mucous.  Abdominal: Soft. Bowel sounds are normal.   No distension and no mass. There is no " hepatosplenomegaly. There is no tenderness.   Musculoskeletal: Normal Muscle tone  Extremities: No edema. Pulses are palpable in all 4 extremities.  Neurological: Patient is alert and oriented to person, place, and time. Cranial nerves II-XII are grossly intact with no focal deficits.  Skin: Skin is warm. No rash noted. Nails show no clubbing.  No cyanosis or erythema.         Results Review:     I reviewed the patient's new clinical results.    Lab Results (last 24 hours)     Procedure Component Value Units Date/Time    CBC & Differential [[id number]] Collected:  11/03/17 1235    Specimen:  Blood Updated:  11/03/17 1253    Narrative:       The following orders were created for panel order CBC & Differential.  Procedure                               Abnormality         Status                     ---------                               -----------         ------                     CBC Auto Differential[[id number]]        Abnormal            Final result                 Please view results for these tests on the individual orders.    CBC Auto Differential [[id number]]  (Abnormal) Collected:  11/03/17 1235    Specimen:  Blood from Arm, Right Updated:  11/03/17 1253     WBC 8.26 10*3/mm3      RBC 3.13 (L) 10*6/mm3      Hemoglobin 10.1 (L) g/dL      Hematocrit 31.2 (L) %      MCV 99.7 (H) fL      MCH 32.3 (H) pg      MCHC 32.4 g/dL      RDW 17.1 (H) %      RDW-SD 62.5 (H) fl      MPV 12.1 (H) fL      Platelets 308 10*3/mm3      Neutrophil % 55.4 %      Lymphocyte % 36.3 %      Monocyte % 3.3 %      Eosinophil % 3.8 %      Basophil % 0.7 %      Immature Grans % 0.5 %      Neutrophils, Absolute 4.58 10*3/mm3      Lymphocytes, Absolute 3.00 10*3/mm3      Monocytes, Absolute 0.27 10*3/mm3      Eosinophils, Absolute 0.31 (H) 10*3/mm3      Basophils, Absolute 0.06 10*3/mm3      Immature Grans, Absolute 0.04 (H) 10*3/mm3      nRBC 0.0 /100 WBC     Comprehensive Metabolic Panel [[id number]]  (Abnormal) Collected:  11/03/17 123     Specimen:  Blood from Arm, Right Updated:  11/03/17 1312     Glucose 106 (H) mg/dL      BUN 42 (H) mg/dL      Creatinine 2.17 (H) mg/dL      Sodium 142 mmol/L      Potassium 4.7 mmol/L      Chloride 102 mmol/L      CO2 27.9 mmol/L      Calcium 9.3 mg/dL      Total Protein 7.2 g/dL      Albumin 3.70 g/dL      ALT (SGPT) 6 U/L      AST (SGOT) 8 U/L      Alkaline Phosphatase 130 (H) U/L      Total Bilirubin 0.4 mg/dL      eGFR Non African Amer 22 (L) mL/min/1.73      Globulin 3.5 gm/dL      A/G Ratio 1.1 g/dL      BUN/Creatinine Ratio 19.4     Anion Gap 12.1 mmol/L     Narrative:       The MDRD GFR formula is only valid for adults with stable renal function between ages 18 and 70.    TSH [[id number]]  (Normal) Collected:  11/03/17 1235    Specimen:  Blood from Arm, Right Updated:  11/03/17 1322     TSH 3.160 mIU/mL     Troponin [[id number]]  (Normal) Collected:  11/03/17 1235    Specimen:  Blood from Arm, Right Updated:  11/03/17 1322     Troponin T <0.010 ng/mL     Narrative:       Troponin T Reference Ranges:  Less than 0.03 ng/mL:    Negative for AMI  0.03 to 0.09 ng/mL:      Indeterminant for AMI  Greater than 0.09 ng/mL: Positive for AMI    Influenza Antigen, Rapid - Swab, Nasopharynx [[id number]]  (Normal) Collected:  11/03/17 1254    Specimen:  Swab from Nasopharynx Updated:  11/03/17 1326     Influenza A Ag, EIA Negative     Influenza B Ag, EIA Negative    D-dimer, Quantitative [[id number]]  (Normal) Collected:  11/03/17 1235    Specimen:  Blood from Arm, Right Updated:  11/03/17 1327     D-Dimer, Quantitative 0.22 MCGFEU/mL     Narrative:       Can be elevated in, but is not diagnostic for deep vein thrombosis (DVT) or pulmonary embolis (PE).  It is also elevated in other medical conditions.  Clinical correlation is required.  The negative cut-off value for the D-Dimer is 0.50 mcg FEU/mL for DVT and PE.    Urinalysis With / Culture If Indicated - Urine, Clean Catch [[id number]]  (Normal) Collected:  11/03/17 9554     Specimen:  Urine from Urine, Catheter Updated:  11/03/17 1330     Color, UA Yellow     Appearance, UA Clear     pH, UA 5.5     Specific Gravity, UA 1.015     Glucose, UA Negative     Ketones, UA Negative     Bilirubin, UA Negative     Blood, UA Negative     Protein, UA Negative     Leuk Esterase, UA Negative     Nitrite, UA Negative     Urobilinogen, UA 0.2 E.U./dL    Narrative:       Urine microscopic not indicated.    Protime-INR [[id number]]  (Abnormal) Collected:  11/03/17 1235    Specimen:  Blood from Arm, Right Updated:  11/03/17 1338     Protime 15.1 (H) Seconds      INR 1.18 (H)    Narrative:       Therapeutic Ranges for INR: 2.0-3.0 (PT 20-30)                              2.5-3.5 (PT 25-34)    aPTT [[id number]]  (Normal) Collected:  11/03/17 1235    Specimen:  Blood from Arm, Right Updated:  11/03/17 1338     PTT 33.6 seconds     Narrative:       PTT = The equivalent PTT values for the therapeutic range of heparin levels at 0.1 to 0.7 U/ml are 53 to 110 seconds.    BNP [[id number]]  (Normal) Collected:  11/03/17 1235    Specimen:  Blood from Arm, Right Updated:  11/03/17 1440     proBNP 86.7 pg/mL     Narrative:       Among patients with dyspnea, NT-proBNP is highly sensitive for the detection of acute congestive heart failure. In addition NT-proBNP of <300 pg/ml effectively rules out acute congestive heart failure with 99% negative predictive value.    POC Glucose Fingerstick [[id number]]  (Abnormal) Collected:  11/03/17 1744    Specimen:  Blood Updated:  11/03/17 1750     Glucose 65 (L) mg/dL     Narrative:       Meter: [id number] : [id number] Kira Otero RN    Troponin [[id number]]  (Normal) Collected:  11/03/17 1814    Specimen:  Blood Updated:  11/03/17 1838     Troponin T <0.010 ng/mL     Narrative:       Troponin T Reference Ranges:  Less than 0.03 ng/mL:    Negative for AMI  0.03 to 0.09 ng/mL:      Indeterminant for AMI  Greater than 0.09 ng/mL: Positive for AMI    Respiratory Panel, PCR -  Swab, Nasopharynx [[id number]] Collected:  11/03/17 2036    Specimen:  Swab from Nasopharynx Updated:  11/03/17 2042    POC Glucose Fingerstick [[id number]]  (Abnormal) Collected:  11/03/17 2055    Specimen:  Blood Updated:  11/03/17 2101     Glucose 227 (H) mg/dL     Narrative:       Meter: [id number] : [id number] Aureliano Easley NURSING ASSISTANT    Troponin [[id number]]  (Normal) Collected:  11/04/17 0005    Specimen:  Blood Updated:  11/04/17 0048     Troponin T <0.010 ng/mL     Narrative:       Troponin T Reference Ranges:  Less than 0.03 ng/mL:    Negative for AMI  0.03 to 0.09 ng/mL:      Indeterminant for AMI  Greater than 0.09 ng/mL: Positive for AMI    CBC Auto Differential [[id number]]  (Abnormal) Collected:  11/04/17 0406    Specimen:  Blood Updated:  11/04/17 0415     WBC 8.32 10*3/mm3      RBC 3.29 (L) 10*6/mm3      Hemoglobin 10.5 (L) g/dL      Hematocrit 32.4 (L) %      MCV 98.5 fL      MCH 31.9 (H) pg      MCHC 32.4 g/dL      RDW 16.8 (H) %      RDW-SD 60.7 (H) fl      MPV 12.1 (H) fL      Platelets 323 10*3/mm3      Neutrophil % 82.3 (H) %      Lymphocyte % 10.5 (L) %      Monocyte % 0.4 (L) %      Eosinophil % 0.1 %      Basophil % 0.4 %      Immature Grans % 6.3 (H) %      Neutrophils, Absolute 6.86 10*3/mm3      Lymphocytes, Absolute 0.87 10*3/mm3      Monocytes, Absolute 0.03 10*3/mm3      Eosinophils, Absolute 0.01 (L) 10*3/mm3      Basophils, Absolute 0.03 10*3/mm3      Immature Grans, Absolute 0.52 (H) 10*3/mm3      nRBC 0.0 /100 WBC     Scan Slide [[id number]]  (Normal) Collected:  11/04/17 0406    Specimen:  Blood Updated:  11/04/17 0415     RBC Morphology Normal     WBC Morphology Normal     Platelet Morphology Normal    BNP [[id number]]  (Normal) Collected:  11/04/17 0406    Specimen:  Blood Updated:  11/04/17 0439     proBNP 214.0 pg/mL     Narrative:       Among patients with dyspnea, NT-proBNP is highly sensitive for the detection of acute congestive heart failure. In addition NT-proBNP  of <300 pg/ml effectively rules out acute congestive heart failure with 99% negative predictive value.    Comprehensive Metabolic Panel [[id number]]  (Abnormal) Collected:  11/04/17 0406    Specimen:  Blood Updated:  11/04/17 0452     Glucose 379 (H) mg/dL      BUN 48 (H) mg/dL      Creatinine 2.19 (H) mg/dL      Sodium 139 mmol/L      Potassium 5.5 (H) mmol/L      Chloride 101 mmol/L      CO2 24.9 mmol/L      Calcium 9.2 mg/dL      Total Protein 7.1 g/dL      Albumin 3.90 g/dL      ALT (SGPT) 7 U/L      AST (SGOT) 10 U/L      Alkaline Phosphatase 131 (H) U/L      Total Bilirubin 0.4 mg/dL      eGFR Non African Amer 22 (L) mL/min/1.73      Globulin 3.2 gm/dL      A/G Ratio 1.2 g/dL      BUN/Creatinine Ratio 21.9     Anion Gap 13.1 mmol/L     Narrative:       The MDRD GFR formula is only valid for adults with stable renal function between ages 18 and 70.    Blood Gas, Arterial [[id number]]  (Abnormal) Collected:  11/04/17 0000    Specimen:  Arterial Blood Updated:  11/04/17 0557     Site Left Radial     Lakhwinder's Test Positive     pH, Arterial 7.408 pH units      pCO2, Arterial 37.7 mm Hg      pO2, Arterial 87.7 mm Hg      HCO3, Arterial 19.8 (L) mmol/L      Base Excess, Arterial 3.1 (H) mmol/L      O2 Saturation, Arterial 97.0 %      Temperature 37.0 C      Barometric Pressure for Blood Gas 742 mmHg      Modality Nasal Cannula     Flow Rate 2.0 lpm      Ventilator Mode NA    POC Glucose Fingerstick [[id number]]  (Abnormal) Collected:  11/04/17 0733    Specimen:  Blood Updated:  11/04/17 0745     Glucose 324 (H) mg/dL     Narrative:       Meter: [id number] : [id number] Richard Streeter NURSING ASSISTANT    Troponin [[id number]]  (Normal) Collected:  11/04/17 0407    Specimen:  Blood Updated:  11/04/17 0747     Troponin T <0.010 ng/mL     Narrative:       Troponin T Reference Ranges:  Less than 0.03 ng/mL:    Negative for AMI  0.03 to 0.09 ng/mL:      Indeterminant for AMI  Greater than 0.09 ng/mL: Positive for AMI           Imaging Results (last 24 hours)     Procedure Component Value Units Date/Time    XR Chest 1 View [[id number]] Collected:  11/03/17 1303     Updated:  11/03/17 1321    Narrative:       CHEST X-RAY, 11/03/2017:     HISTORY:   77-year-old female in the ED complaining of two week history of  shortness of air.     TECHNIQUE:  AP portable upright chest x-ray.     FINDINGS:  Low lung volumes. Mild basilar atelectasis. The lungs appear clear.  Heart size and pulmonary vascularity are normal. No visible pleural  effusion.       Impression:       1. No active disease.  2. Low lung volumes with mild bibasilar atelectasis.     This report was finalized on 11/3/2017 1:03 PM by Dr. Jacques Bates MD.       NM Lung Ventilation Perfusion [[id number]] Collected:  11/03/17 1623     Updated:  11/03/17 1628    Narrative:       VENTILATION/PERFUSION LUNG SCAN, 11/03/2017:     HISTORY:   77-year-old female in the ED 2 1/2 week history of worsening chronic  shortness of air. D-dimer is normal.     DOSE:   *  27.2 mCi technetium labeled DTPA inhaled in aerosol.  *  5.9 mCi technetium labeled MAA administered intravenously.     COMPARISON:   Chest x-ray today.     FINDINGS:   Mildly heterogeneous distribution of inhaled tracer in both lungs on  ventilation images. Perfusion images are normal. No unmatched perfusion  defect is identified to suggest pulmonary embolism.       Impression:       Low probability of pulmonary embolism.     This report was finalized on 11/3/2017 4:26 PM by Dr. Jacques Bates MD.       CT Chest Without Contrast [[id number]] Collected:  11/04/17 0724     Updated:  11/04/17 0727    Narrative:       CHEST CT WITHOUT CONTRAST     HISTORY:  Shortness of breath with hypoxia. FINDINGS noted in the doctor's office  today     TECHNIQUE:  Axial images were obtained without contrast and evaluated at lung and  mediastinal windows. Radiation dose reduction techniques were utilized,  including automated exposure  control and exposure modulation based on  body size.     FINDINGS:  Chest images at mediastinal window show no enlarged mediastinal or hilar  lymph nodes. The aorta is mildly tortuous and ectatic. No pleural or  pericardial fluid.     Images at lung window show mildly prominent interstitial markings at  both lung bases consistent with either mild basilar atelectasis or  fibrosis. The upper lung fields are clear. No bronchiectasis.       Impression:       Mild bibasilar atelectasis versus fibrosis. No active  process noted.     This report was finalized on 11/4/2017 7:25 AM by Dr. Casey Quiles MD.             Xray reviewed personally by physician.      ECG not reviewed personally by physician  ECG/EMG Results (most recent)     Procedure Component Value Units Date/Time    ECG 12 Lead [[id number]] Collected:  11/03/17 1228     Updated:  11/03/17 1411    Narrative:       RR Interval= 706 ms  WY Interval= 160 ms  QRSD Interval= 136 ms  QT Interval= 380 ms  QTc Interval= 452 ms  Heart Rate= 85 ms  P Axis= 49 deg  QRS Axis= -9 deg  T Wave Axis= -9 deg  I: 40 Axis= -12 deg  T: 40 Axis= 165 deg  ST Axis= 9 deg  SINUS RHYTHM  LEFT ANTERIOR FASCICULAR BLOCK  RIGHT BUNDLE BRANCH BLOCK  NO SIGNIFICANT CHANGE FROM PREVIOUS ECG  Electronically Signed by:  Kehinde Sanon ([facility]) 03-Nov-2017 14:08:24  Date and Time of Study: 2017-11-03 12:28:44          Medication Review:   I have reviewed the patient's current medication list    Current Facility-Administered Medications:   •  acetaminophen (TYLENOL) tablet 650 mg, 650 mg, Oral, Q6H PRN, Saulo Mak MD  •  apixaban (ELIQUIS) tablet 2.5 mg, 2.5 mg, Oral, BID, Saulo Mak MD, 2.5 mg at 11/04/17 0820  •  aspirin tablet 325 mg, 325 mg, Oral, Daily, Saulo Mak MD, 325 mg at 11/04/17 0820  •  atorvastatin (LIPITOR) tablet 10 mg, 10 mg, Oral, Nightly, Saulo Mak MD, 10 mg at 11/03/17 2042  •  calcium carb-cholecalciferol 600-800 MG-UNIT tablet 1 tablet, 1 tablet, Oral, BID With  Meals, Saulo Mak MD, 1 tablet at 11/04/17 0820  •  cyclobenzaprine (FLEXERIL) tablet 10 mg, 10 mg, Oral, TID PRN, Saulo Mak MD  •  docusate sodium (COLACE) capsule 100 mg, 100 mg, Oral, BID, Saulo Mak MD, 100 mg at 11/04/17 0820  •  DULoxetine (CYMBALTA) DR capsule 60 mg, 60 mg, Oral, QAM, Sualo Mak MD, 60 mg at 11/04/17 0631  •  furosemide (LASIX) tablet 20 mg, 20 mg, Oral, QAM, Saulo Mak MD, 20 mg at 11/04/17 0629  •  gabapentin (NEURONTIN) capsule 400 mg, 400 mg, Oral, TID, Saulo Mak MD, 400 mg at 11/04/17 0830  •  HYDROcodone-acetaminophen (NORCO) 5-325 MG per tablet 1 tablet, 1 tablet, Oral, Q12H PRN, Saulo Mak MD  •  insulin aspart (novoLOG) injection 15 Units, 15 Units, Subcutaneous, QAM AC, Saulo Mak MD, 15 Units at 11/04/17 0809  •  insulin aspart (novoLOG) injection 20 Units, 20 Units, Subcutaneous, Daily Before Lunch, Saulo Mak MD  •  insulin aspart (novoLOG) injection 20 Units, 20 Units, Subcutaneous, Daily Before Supper, Saulo Mak MD  •  ipratropium-albuterol (DUO-NEB) nebulizer solution 3 mL, 3 mL, Nebulization, 4x Daily - RT, Saulo Mak MD, 3 mL at 11/04/17 0816  •  levothyroxine (SYNTHROID, LEVOTHROID) tablet 75 mcg, 75 mcg, Oral, QAM, Saulo Mak MD, 75 mcg at 11/04/17 0629  •  melatonin tablet 5 mg, 5 mg, Oral, Nightly PRN, Saulo Mak MD, 5 mg at 11/03/17 2042  •  metoprolol tartrate (LOPRESSOR) tablet 25 mg, 25 mg, Oral, BID, Saulo Mak MD, 25 mg at 11/04/17 0820  •  pantoprazole (PROTONIX) EC tablet 40 mg, 40 mg, Oral, Q AM, Saulo Mak MD, 40 mg at 11/04/17 0631  •  pneumococcal polysaccharide 23-valent (PNEUMOVAX-23) vaccine 0.5 mL, 0.5 mL, Intramuscular, During Hospitalization, Saulo Mak MD  •  potassium chloride (K-DUR) CR tablet 10 mEq, 10 mEq, Oral, QAM, Saulo Mak MD  •  potassium chloride (K-DUR,KLOR-CON) 10 MEQ CR tablet  - ADS Override Pull, , , ,   •  sodium chloride 0.9 % flush 1-10 mL, 1-10  mL, Intravenous, PRN, Saulo Mak MD  •  sodium chloride 0.9 % infusion 40 mL, 40 mL, Intravenous, PRN, Saulo Mak MD  •  thiamine (VITAMIN B-1) tablet 100 mg, 100 mg, Oral, Daily, Saulo Mak MD, 100 mg at 11/04/17 0820  •  vitamin B-12 (CYANOCOBALAMIN) tablet 1,000 mcg, 1,000 mcg, Oral, Daily, Saulo Mak MD, 1,000 mcg at 11/04/17 0820      Assessment/Plan     Acute hypoxic respiratory failure/ etiology unknown/  Pulmonary working this up  Persistent chronic cough/  Several months/  Etiology unknown/  Sore abdominal musculature/ lisinopril discontinued.  We will monitor BP to see if she needs another agent.   Orthostatic bp checks were ordered  DM2:  Uncontrolled at this time.  Not on steroids that I can see.  Ordered sliding scale on other insulin  CKD Stage 3  AARON  H/O DVT/  On Eliquis chronically  Anemia of chronic disease  Morbid obesity:  Body mass index is 39.07 kg/(m^2).        Plan for disposition:  I have added sliding scale insulin.  Blood sugars today have continued to climb.  I will change from low dose ss to medium dose.    Renetta Lua DO  11/04/17  9:18 AM      Time: 30 min

## 2017-11-04 NOTE — PLAN OF CARE
Problem: Patient Care Overview (Adult)  Goal: Discharge Needs Assessment  Outcome: Ongoing (interventions implemented as appropriate)    11/04/17 1125   Discharge Needs Assessment   Concerns To Be Addressed denies needs/concerns at this time   Readmission Within The Last 30 Days no previous admission in last 30 days   Equipment Needed After Discharge (will continue to assess)   Discharge Planning Comments spoke with patient and family at bedside. patient agreeable to speak to  in front of visitors. she lives with family in a multi level home and denies having to go to basement. has used [facility]  in the past. has a [employer] nurse that comes once a week. has a rolling walker, cane, w/c, glucometer and c-pap. independent with ADL's including some light meal prep. she also does light housekeeping. her family assist with shopping, meal prep and transport. her son Cedrick takes her to MD appts. uses [facility] and denies difficulty with medications. pharmacy will deliver medications for $1 if needed. she states she has a living will and it should be on file. encouraged to bring to scan. face sheet verified. plan is home when medically stable. will continue to follow.    Current Health   Anticipated Changes Related to Illness none   Living Environment   Transportation Available family or friend will provide   Self-Care   Equipment Currently Used at Home walker, rolling;wheelchair;bipap/ cpap;cane, straight;glucometer

## 2017-11-04 NOTE — PROCEDURES
Exercise Oximetry    Patient Name:Joya Singh   MRN: [id number]   Date: 11/04/17             ROOM AIR BASELINE   SpO2% 94 at rest   Heart Rate 104   Blood Pressure 134/73     EXERCISE ON ROOM AIR SpO2% EXERCISE ON O2 @  LPM SpO2%   1 MINUTE 96 1 MINUTE    2 MINUTES See comments 2 MINUTES    3 MINUTES  3 MINUTES    4 MINUTES  4 MINUTES    5 MINUTES  5 MINUTES    6 MINUTES  6 MINUTES               Distance Walked  41feet Distance Walked   Dyspnea (Yolis Scale)  Pre 9/ Post 10 Dyspnea (Yolis Scale)   Fatigue (Yolis Scale)  Pre 9/ Post 10 Fatigue (Yolis Scale)   SpO2% Post Exercise  95 SpO2% Post Exercise   HR Post Exercise  116 HR Post Exercise   Time to Recovery  See comment Time to Recovery     Comments: Pt used walker, became dizzy after 1:45 and had to lie down. Pt stated felt she would fall. Test discontinued.

## 2017-11-04 NOTE — CONSULTS
Patient Care Team:  Chari Mercado MD as PCP - General  Chari Mercado MD as PCP - Family Medicine  Christos Rob MD as Surgeon (General Surgery)  German Wylie MD as Surgeon (Orthopedic Surgery)      Subjective     Patient is a 77 y.o. female.  Asked to see regarding hypoxia.  Patient apparently went to see her primary care physician Dr. Mercado yesterday and apparently had a number of complaints but apparently had an oxygen saturation of 78% on room air.  Patient reports she's had a cough mostly nonproductive for at least 3 months or more now.  She has gotten extremely dizzy particularly when she gets up feeling like she's going to pass out.  With this some time she has some nausea and sometimes some chest pain.  Apparently she had some of these complaints recently last month and had an echocardiogram and stress test that didn't show any evidence of ischemia and she had a normal LV systolic function grade 1 diastolic dysfunction normal RV systolic function and she did not have significant enough tricuspid regurgitation to measure RV pressures.  Patient apparently was complaining of about 10 days of shortness of breath when she was at Dr. Mercado's office although her current complaint is more the cough and dizziness and extreme weakness when she gets up.  Patient reports she has had nausea and a couple of episodes of emesis with this dizziness over the last few weeks.  No lower extremity pain or swelling  history of blood clots left leg a number of years ago..  She is a diabetic and reports she does have bad reflux disease she's never been diagnosed with any lung disease she's never smoker no history of asthma.  She's not really had any fevers chills or sweats she does have obstructive sleep apnea but does not tolerate Pap machine and does not use it regularly although she still has a machine at home      Review of Systems:  No sore throat no runny nose no difficulty swallowing.   Again no fevers chills sweats no recent weight loss.  No melena hematochezia no diarrhea no hematemesis.  She does report a history of MI and hypertension she's apparently had a TIA in the past no recent headaches or visual changes.  She has hyperlipidemia and she says she has bad diabetes is difficult to control his get some arthritis she's had prior thyroid problems and on replacement there is well.  She denies any alcohol or illicit drug use      History  Past Medical History:   Diagnosis Date   • Allergic rhinitis    • Anxiety    • Arthritis    • Bell's palsy    • Cancer     REMOVED FROM ARM   • Depression    • Diabetes mellitus     LAST A1C 6   • Disease of thyroid gland    • GERD (gastroesophageal reflux disease)    • H/O blood clots     LEFT LEG 7 OR 8 YEARS AGO   • Hyperlipidemia    • Hypertension    • Kidney disease    • Left knee pain     scheduled for sx   • AARON (obstructive sleep apnea)     DOESNT WEAR REGULARLY   • PONV (postoperative nausea and vomiting)    • Stroke    • TIA (transient ischemic attack)     LAST TIA JULY 2017     Past Surgical History:   Procedure Laterality Date   • BACK SURGERY      HARDWARE   • CHOLECYSTECTOMY      OPEN   • COLONOSCOPY  2011    due for repeat in 2021   • HYSTERECTOMY      PARTIAL    • SPINE SURGERY     • UPPER GASTROINTESTINAL ENDOSCOPY  2014    gastritis.  done by dr. hastings     Social History     Social History   • Marital status:      Spouse name: N/A   • Number of children: N/A   • Years of education: N/A     Social History Main Topics   • Smoking status: Never Smoker   • Smokeless tobacco: Never Used   • Alcohol use No   • Drug use: No   • Sexual activity: Defer      Comment: EXERCISE - RARELY     Other Topics Concern   • None     Social History Narrative     Family History   Problem Relation Age of Onset   • Lupus Mother    • Heart disease Other    • Hypertension Other          Allergies:  Morphine and related; Baclofen; Codeine; Morphine; and  Penicillins    Medications:  Prior to Admission medications    Medication Sig Start Date End Date Taking? Authorizing Provider   ACCU-CHEK FASTCLIX LANCETS misc TEST 3-4 TIMES DAILY AS DIRECTED 8/28/17   Chari Mercado MD   acetaminophen (TYLENOL) 500 MG tablet Take 1,000 mg by mouth Every 4 (Four) Hours As Needed. 4/1/14   Historical Provider, MD   Alcohol Swabs (B-D SINGLE USE SWABS REGULAR) pads  7/7/14   Historical Provider, MD   apixaban (ELIQUIS) 2.5 MG tablet tablet Take 1 tablet by mouth 2 (Two) Times a Day. 8/18/17   Chari Mercado MD   aspirin 325 MG tablet Take 325 mg by mouth Daily.    Historical Provider, MD   atorvastatin (LIPITOR) 10 MG tablet Take 10 mg by mouth Every Night.    Historical Provider, MD   Blood Glucose Monitoring Suppl (ACCU-CHEK KENNETH SMARTVIEW) W/DEVICE kit USE AS DIRECTED 6/20/14   Chari Mercado MD   buPROPion SR (WELLBUTRIN SR) 150 MG 12 hr tablet Take 150 mg by mouth Every Night. 12/7/16   Historical Provider, MD   Calcium Carb-Cholecalciferol (CALCIUM PLUS VITAMIN D3 PO) Take 1 tablet by mouth Every Morning. 5/20/14   Historical Provider, MD   Cyanocobalamin (VITAMELTS ENERGY VITAMIN B-12) 1500 MCG tablet dispersible Take 1 tablet by mouth Every Morning.    Historical Provider, MD   cyclobenzaprine (FLEXERIL) 10 MG tablet Take 10 mg by mouth 3 (Three) Times a Day As Needed for Muscle Spasms.    Historical Provider, MD   DULoxetine (CYMBALTA) 60 MG capsule Take 60 mg by mouth Every Morning.    Historical Provider, MD   furosemide (LASIX) 20 MG tablet Take 20 mg by mouth Every Morning.    Historical Provider, MD   gabapentin (NEURONTIN) 400 MG capsule Take 400 mg by mouth 3 (Three) Times a Day.    Historical Provider, MD   glucose blood (ACCU-CHEK SMARTVIEW) test strip Accu-Chek SmartView In Vitro Strip TEST THREE TIMES DAILY  TO FOUR TIMES DAILY AS DIRECTED 8/17/16   Chari Mercado MD   HYDROcodone-acetaminophen (NORCO) 5-325 MG per tablet  "Take 1 tablet by mouth Every 12 (Twelve) Hours As Needed for Moderate Pain . 10/4/17   Chari Mercado MD   insulin aspart (NOVOLOG FLEXPEN) 100 UNIT/ML solution pen-injector sc pen 20 units sq before breakfast then 25 units sq before lunch and dinner  Patient taking differently: 3 (Three) Times a Day With Meals. 20 units sq before breakfast then 25 units sq before lunch and dinner 9/26/17   Chari Mercado MD   Insulin Pen Needle (ULTICARE MINI PEN NEEDLES) 31G X 6 MM misc To check blood sugar once daily 11/1/17   Chari Mercado MD   levothyroxine (SYNTHROID, LEVOTHROID) 75 MCG tablet Take 75 mcg by mouth Every Morning.    Historical Provider, MD   lisinopril (PRINIVIL,ZESTRIL) 5 MG tablet Take 5 mg by mouth Every Morning.    Historical Provider, MD   melatonin 5 MG tablet tablet Take 5 mg by mouth At Night As Needed.    Historical Provider, MD   metoprolol tartrate (LOPRESSOR) 12.5 MG half tablet Take 12.5 mg by mouth 2 (Two) Times a Day.    Historical Provider, MD   Needle, Disp, (BD DISP NEEDLES) 30G X 1/2\" misc To be used 3 times daily with Novolog Flexpen. 8/4/16   Chari Mercado MD   omeprazole (priLOSEC) 40 MG capsule Take 40 mg by mouth Every Morning.    Historical Provider, MD   potassium chloride (K-DUR) 10 MEQ CR tablet Take 10 mEq by mouth Every Morning.    Historical Provider, MD   thiamine (VITAMIN B-1) 100 MG tablet Take 100 mg by mouth Daily.    Historical Provider, MD   TRESIBA FLEXTOUCH 200 UNIT/ML solution pen-injector Inject 80 Units under the skin Every Night. 4/19/17   Historical Provider, MD   ULTICARE MICRO PEN NEEDLES 32G X 4 MM misc  11/29/16   Historical Provider, MD   ULTICARE PEN NEEDLES 29G X 12MM misc  4/17/17   Historical Provider, MD       apixaban 2.5 mg Oral BID   aspirin 325 mg Oral Daily   atorvastatin 10 mg Oral Nightly   calcium carb-cholecalciferol 1 tablet Oral BID With Meals   docusate sodium 100 mg Oral BID   DULoxetine 60 mg Oral QAM " "  furosemide 20 mg Oral QAM   gabapentin 400 mg Oral TID   insulin aspart 15 Units Subcutaneous QAM AC   insulin aspart 20 Units Subcutaneous Daily Before Lunch   insulin aspart 20 Units Subcutaneous Daily Before Supper   ipratropium-albuterol 3 mL Nebulization 4x Daily - RT   levothyroxine 75 mcg Oral QAM   methylPREDNISolone sodium succinate 60 mg Intravenous Q6H   metoprolol tartrate 25 mg Oral BID   pantoprazole 40 mg Oral Q AM   potassium chloride 10 mEq Oral QAM   potassium chloride      thiamine 100 mg Oral Daily   cyanocobalamin 1,000 mcg Oral Daily          Objective     Vital Signs  Vital Sign Min/Max for last 24 hours  Temp  Min: 96.9 °F (36.1 °C)  Max: 98 °F (36.7 °C)   BP  Min: 115/61  Max: 185/92   Pulse  Min: 73  Max: 92   Resp  Min: 18  Max: 20   SpO2  Min: 87 %  Max: 100 %   Flow (L/min)  Min: 2  Max: 2.5   Weight  Min: 211 lb 5 oz (95.9 kg)  Max: 213 lb 9.6 oz (96.9 kg)       Intake/Output Summary (Last 24 hours) at 11/04/17 0808  Last data filed at 11/03/17 1900   Gross per 24 hour   Intake              240 ml   Output                0 ml   Net              240 ml        Last Weight and Admission Weight    Last Weight    11/03/17  1557   Weight: 213 lb 9.6 oz (96.9 kg)     Flowsheet Rows         First Filed Value    Admission Height  62.5\" (158.8 cm) Documented at 11/03/2017 1214    Admission Weight  211 lb 5 oz (95.9 kg) Documented at 11/03/2017 1214          Body mass index is 39.07 kg/(m^2).           Physical Exam:  General Appearance: Well-developed morbidly obese white female resting comfortably in bed her oxygen saturations are 97-99% on 2 L nasal cannula O2 she does not appear in acute distress she has frequent dry  cough  Eyes: Conjunctiva clear and anicteric pupils are equal and reactive  ENT: Nasal septum midline oral mucous membranes are moist no exudates Mallampati 4 airway which she has some teeth missing  Neck: No adenopathy or thyromegaly no jugular venous distention no " hepatojugular reflux trachea midline  Lungs: Clear I don't hear any wheezes rales or rhonchi nonlabored symmetric expansion she does have some coughing  Cardiac: Regular rate and rhythm no murmur or gallop  Abdomen: Obese soft nontender no palpable organomegaly or masses  : Not examined  Musc/Skel: Grossly normal specifically there is no palpable cords in the lower extremity no pain on dorsiflexion of the feet  Skin: No jaundice, no rashes, no petechiae  Neuro: Alert oriented ×3 pleasant cooperative following commands moving all extremities grossly intact  Extremities/P Vascular: No clubbing cyanosis or edema she has palpable radial and dorsalis pedis pulses bilaterally  MSE: I'm not real sure she seems to be a little anxious      Labs:    Results from last 7 days  Lab Units 11/04/17  0406 11/03/17  1235   GLUCOSE mg/dL 379* 106*   SODIUM mmol/L 139 142   POTASSIUM mmol/L 5.5* 4.7   CO2 mmol/L 24.9 27.9   CHLORIDE mmol/L 101 102   ANION GAP mmol/L 13.1 12.1   CREATININE mg/dL 2.19* 2.17*   BUN mg/dL 48* 42*   BUN / CREAT RATIO  21.9 19.4   CALCIUM mg/dL 9.2 9.3   EGFR IF NONAFRICN AM mL/min/1.73 22* 22*   ALK PHOS U/L 131* 130*   TOTAL PROTEIN g/dL 7.1 7.2   ALT (SGPT) U/L 7 6   AST (SGOT) U/L 10 8   BILIRUBIN mg/dL 0.4 0.4   ALBUMIN g/dL 3.90 3.70   GLOBULIN gm/dL 3.2 3.5   A/G RATIO g/dL 1.2 1.1     Estimated Creatinine Clearance: 24.1 mL/min (by C-G formula based on Cr of 2.19).        Results from last 7 days  Lab Units 11/04/17  0406 11/03/17  1235   WBC 10*3/mm3 8.32 8.26   RBC 10*6/mm3 3.29* 3.13*   HEMOGLOBIN g/dL 10.5* 10.1*   HEMATOCRIT % 32.4* 31.2*   MCV fL 98.5 99.7*   MCH pg 31.9* 32.3*   MCHC g/dL 32.4 32.4   RDW % 16.8* 17.1*   RDW-SD fl 60.7* 62.5*   MPV fL 12.1* 12.1*   PLATELETS 10*3/mm3 323 308   NEUTROPHIL % % 82.3* 55.4   LYMPHOCYTE % % 10.5* 36.3   MONOCYTES % % 0.4* 3.3   EOSINOPHIL % % 0.1 3.8   BASOPHIL % % 0.4 0.7   IMM GRAN % % 6.3* 0.5   NEUTROS ABS 10*3/mm3 6.86 4.58   LYMPHS ABS  10*3/mm3 0.87 3.00   MONOS ABS 10*3/mm3 0.03 0.27   EOS ABS 10*3/mm3 0.01* 0.31*   BASOS ABS 10*3/mm3 0.03 0.06   IMMATURE GRANS (ABS) 10*3/mm3 0.52* 0.04*   NRBC /100 WBC 0.0 0.0       Results from last 7 days  Lab Units 11/04/17  0000   PH, ARTERIAL pH units 7.408   PO2 ART mm Hg 87.7   PCO2, ARTERIAL mm Hg 37.7   HCO3 ART mmol/L 19.8*       Results from last 7 days  Lab Units 11/04/17  0407 11/04/17  0005 11/03/17  1814   TROPONIN T ng/mL <0.010 <0.010 <0.010       Results from last 7 days  Lab Units 11/04/17  0406 11/03/17  1235   PROBNP pg/mL 214.0 86.7       Results from last 7 days  Lab Units 11/03/17  1235   TSH mIU/mL 3.160           Results from last 7 days  Lab Units 11/03/17  1235   INR  1.18*     Microbiology Results (last 10 days)     Procedure Component Value - Date/Time    Influenza Antigen, Rapid - Swab, Nasopharynx [[id number]]  (Normal) Collected:  11/03/17 1254    Lab Status:  Final result Specimen:  Swab from Nasopharynx Updated:  11/03/17 1326     Influenza A Ag, EIA Negative     Influenza B Ag, EIA Negative            Diagnostics:  Ct Chest Without Contrast    Result Date: 11/4/2017  CHEST CT WITHOUT CONTRAST  HISTORY: Shortness of breath with hypoxia. FINDINGS noted in the doctor's office today  TECHNIQUE: Axial images were obtained without contrast and evaluated at lung and mediastinal windows. Radiation dose reduction techniques were utilized, including automated exposure control and exposure modulation based on body size.  FINDINGS: Chest images at mediastinal window show no enlarged mediastinal or hilar lymph nodes. The aorta is mildly tortuous and ectatic. No pleural or pericardial fluid.  Images at lung window show mildly prominent interstitial markings at both lung bases consistent with either mild basilar atelectasis or fibrosis. The upper lung fields are clear. No bronchiectasis.      Mild bibasilar atelectasis versus fibrosis. No active process noted.  This report was finalized on  11/4/2017 7:25 AM by Dr. Casey Quiles MD.      Nm Lung Ventilation Perfusion    Result Date: 11/3/2017  VENTILATION/PERFUSION LUNG SCAN, 11/03/2017:  HISTORY: 77-year-old female in the ED 2 1/2 week history of worsening chronic shortness of air. D-dimer is normal.  DOSE: *  27.2 mCi technetium labeled DTPA inhaled in aerosol. *  5.9 mCi technetium labeled MAA administered intravenously.  COMPARISON: Chest x-ray today.  FINDINGS: Mildly heterogeneous distribution of inhaled tracer in both lungs on ventilation images. Perfusion images are normal. No unmatched perfusion defect is identified to suggest pulmonary embolism.      Low probability of pulmonary embolism.  This report was finalized on 11/3/2017 4:26 PM by Dr. Jacques Bates MD.      Xr Chest 1 View    Result Date: 11/3/2017  CHEST X-RAY, 11/03/2017:  HISTORY: 77-year-old female in the ED complaining of two week history of shortness of air.  TECHNIQUE: AP portable upright chest x-ray.  FINDINGS: Low lung volumes. Mild basilar atelectasis. The lungs appear clear. Heart size and pulmonary vascularity are normal. No visible pleural effusion.      1. No active disease. 2. Low lung volumes with mild bibasilar atelectasis.  This report was finalized on 11/3/2017 1:03 PM by Dr. Jacques Bates MD.      Results for orders placed during the hospital encounter of 10/05/17   Adult Transthoracic Echo Complete W/ Cont if Necessary Per Protocol    Narrative · Left ventricular systolic function is normal. Calculated EF = 61.6%.   Estimated EF was in agreement with the calculated EF. Normal left   ventricular cavity size noted. All left ventricular wall segments contract   normally. Left ventricular wall thickness is consistent with borderline   concentric hypertrophy. Left ventricular diastolic dysfunction is noted   (grade I) consistent with impaired relaxation.  · Normal right ventricular systolic function noted. Right ventricular   cavity is borderline dilated.           I have reviewed multiple studies ventilation/perfusion lung scan normal perfusion there is a little bit of heterogenicity in the ventilation portion I suspect that this is a little air trapping.  CT of the chest very minimal basilar atelectasis fairly large cardiac silhouette otherwise unremarkable I did review the results of her August 2017 barium esophagram there was no evidence of reflux during the study no hiatal hernia and a negative swallow overall    Assessment/Plan     1. Acute hypoxic respiratory failure etiology is not clear arterial blood gas was done on 2 L she obviously has some hypoxia but an SPO2 of 78% does not come close to matching the PO2 with 2 L nasal cannula O2.  She does not have any significant infiltrates nor does she have any evidence for pulmonary emboli given the studies that been completed.  I would like to wean her oxygen off and see what her saturations are on room air awake.  I'm sure with her sleep apnea she drops when she sleeps.  I would like to get some pulmonary functions on her but I think her cough has to be better before we'll be able get any real evaluable studies.  I would like to get if she is steady enough on her feet an exercise oximetry using the forehead probe.  I do not think steroids are necessary at this time particularly with her diabetes  2. Persistent/chronic cough of a number of months duration nonproductive.  No history of lung disease.  At some point I would like to get some lung functions to make sure she doesn't have cough variant asthma but just the hacking nature of her cough makes me very suspicious that it may be related to her lisinopril.  I did discontinue her lisinopril I will leave to primary care whether to start an ARB, or other agent to control her pressure.  I am worried about orthostatic hypotension with her dizziness and would like to see what orthostatic blood pressures are as well before starting any antihypertensive.  3. Dizziness  particularly when she gets up this certainly sounds like orthostatic hypotension orders have been placed for blood pressure checks.    4. 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 Obstructive sleep apnea Pap intolerant  7. 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 Anemia chronic disease  9. Morbid obesity      Mihir Acuña MD  11/04/17  8:08 AM    Time:

## 2017-11-04 NOTE — NURSING NOTE
Discharge Planning Assessment  VICKY Sharma     Patient Name: Joya Singh  MRN: [id number]  Today's Date: 11/4/2017    Admit Date: 11/3/2017          Discharge Needs Assessment       11/04/17 1125    Living Environment    Lives With Child(isabel), adult    Living Arrangements house    Home Accessibility no concerns;bed and bath on same level    Type of Financial/Environmental Concern none    Transportation Available family or friend will provide    Living Environment    Provides Primary Care For no one    Quality Of Family Relationships supportive;helpful;involved    Able to Return to Prior Living Arrangements yes    Discharge Needs Assessment    Concerns To Be Addressed denies needs/concerns at this time    Readmission Within The Last 30 Days no previous admission in last 30 days    Anticipated Changes Related to Illness none    Equipment Currently Used at Home walker, rolling;wheelchair;bipap/ cpap;cane, straight;glucometer    Equipment Needed After Discharge --   will continue to assess    Discharge Planning Comments spoke with patient and family at bedside. patient agreeable to speak to  in front of visitors. she lives with family in a multi level home and denies having to go to basement. has used [facility]  in the past. has a [employer] nurse that comes once a week. has a rolling walker, cane, w/c, glucometer and c-pap. independent with ADL's including some light meal prep. she also does light housekeeping. her family assist with shopping, meal prep and transport. her son Cedrick takes her to [facility]. uses [facility] and denies difficulty with medications. pharmacy will deliver medications for $1 if needed. she states she has a living will and it should be on file. encouraged to bring to scan. face sheet verified. plan is home when medically stable. will continue to follow.                Discharge Plan       11/04/17 1130    Case Management/Social Work Plan    Plan home    Patient/Family In  Agreement With Plan yes    Additional Comments spoke with patient and family at bedside. patient agreeable to speak to  in front of visitors. she lives with family in a multi level home and denies having to go to basement. has used [facility] in the past. has a [employer] nurse that comes once a week. has a rolling walker, cane, w/c, glucometer and c-pap. independent with ADL's including some light meal prep. she also does light housekeeping. her family assist with shopping, meal prep and transport. her son Cedrick takes her to [facility]. uses [facility] and denies difficulty with medications. pharmacy will deliver medications for $1 if needed. she states she has a living will and it should be on file. encouraged to bring to scan. face sheet verified. plan is home when medically stable. will continue to follow.          Discharge Placement     No information found                Demographic Summary       11/04/17 1124    Referral Information    Admission Type inpatient    Arrived From home or self-care    Referral Source admission list    Reason For Consult discharge planning    Record Reviewed medical record    Contact Information    Permission Granted to Share Information With ;family/designee            Functional Status     None            Psychosocial     None            Abuse/Neglect     None            Legal     None            Substance Abuse     None            Patient Forms     None          Isabell Stapleton, ANISH

## 2017-11-04 NOTE — PLAN OF CARE
Problem: Patient Care Overview (Adult)  Goal: Plan of Care Review  Outcome: Ongoing (interventions implemented as appropriate)    11/04/17 0353   Coping/Psychosocial Response Interventions   Plan Of Care Reviewed With patient   Patient Care Overview   Progress progress toward functional goals as expected         11/04/17 0353   Coping/Psychosocial Response Interventions   Plan Of Care Reviewed With patient   Patient Care Overview   Progress progress toward functional goals as expected   Outcome Evaluation   Outcome Summary/Follow up Plan VSS, no complaints of pain. Patient has rested very well.          Problem: Respiratory Insufficiency (Adult)  Goal: Acid/Base Balance  Outcome: Ongoing (interventions implemented as appropriate)    11/04/17 0353   Respiratory Insufficiency (Adult)   Acid/Base Balance making progress toward outcome       Goal: Effective Ventilation  Outcome: Ongoing (interventions implemented as appropriate)    11/04/17 0353   Respiratory Insufficiency (Adult)   Effective Ventilation making progress toward outcome         Problem: Activity Intolerance (Adult)  Goal: Identify Related Risk Factors and Signs and Symptoms  Outcome: Ongoing (interventions implemented as appropriate)    11/04/17 0353   Activity Intolerance   Activity Intolerance: Related Risk Factors functional decline;generalized weakness;insufficient sleep/rest   Signs and Symptoms (Activity Intolerance) dizziness/faintness;dyspnea/shortness of breath       Goal: Activity Tolerance  Outcome: Ongoing (interventions implemented as appropriate)    11/04/17 0353   Activity Intolerance (Adult)   Activity Tolerance making progress toward outcome       Goal: Effective Energy Conservation Techniques  Outcome: Ongoing (interventions implemented as appropriate)    11/04/17 0353   Activity Intolerance (Adult)   Effective Energy Conservation Techniques making progress toward outcome

## 2017-11-05 LAB
GLUCOSE BLDC GLUCOMTR-MCNC: 143 MG/DL (ref 70–130)
GLUCOSE BLDC GLUCOMTR-MCNC: 263 MG/DL (ref 70–130)
GLUCOSE BLDC GLUCOMTR-MCNC: 272 MG/DL (ref 70–130)
GLUCOSE BLDC GLUCOMTR-MCNC: 284 MG/DL (ref 70–130)

## 2017-11-05 PROCEDURE — 82962 GLUCOSE BLOOD TEST: CPT

## 2017-11-05 PROCEDURE — 63710000001 INSULIN ASPART PER 5 UNITS: Performed by: INTERNAL MEDICINE

## 2017-11-05 PROCEDURE — 99232 SBSQ HOSP IP/OBS MODERATE 35: CPT | Performed by: HOSPITALIST

## 2017-11-05 PROCEDURE — 94762 N-INVAS EAR/PLS OXIMTRY CONT: CPT

## 2017-11-05 PROCEDURE — 94799 UNLISTED PULMONARY SVC/PX: CPT

## 2017-11-05 RX ORDER — DULOXETIN HYDROCHLORIDE 30 MG/1
CAPSULE, DELAYED RELEASE ORAL
Status: COMPLETED
Start: 2017-11-05 | End: 2017-11-05

## 2017-11-05 RX ADMIN — INSULIN ASPART 6 UNITS: 100 INJECTION, SOLUTION INTRAVENOUS; SUBCUTANEOUS at 21:20

## 2017-11-05 RX ADMIN — IPRATROPIUM BROMIDE AND ALBUTEROL SULFATE 3 ML: .5; 3 SOLUTION RESPIRATORY (INHALATION) at 15:48

## 2017-11-05 RX ADMIN — Medication 1 TABLET: at 08:36

## 2017-11-05 RX ADMIN — DOCUSATE SODIUM 100 MG: 100 CAPSULE, LIQUID FILLED ORAL at 08:35

## 2017-11-05 RX ADMIN — ATORVASTATIN CALCIUM 10 MG: 10 TABLET, FILM COATED ORAL at 21:20

## 2017-11-05 RX ADMIN — IPRATROPIUM BROMIDE AND ALBUTEROL SULFATE 3 ML: .5; 3 SOLUTION RESPIRATORY (INHALATION) at 07:48

## 2017-11-05 RX ADMIN — APIXABAN 2.5 MG: 2.5 TABLET, FILM COATED ORAL at 17:44

## 2017-11-05 RX ADMIN — DULOXETINE HYDROCHLORIDE 60 MG: 30 CAPSULE, DELAYED RELEASE ORAL at 06:30

## 2017-11-05 RX ADMIN — APIXABAN 2.5 MG: 2.5 TABLET, FILM COATED ORAL at 08:35

## 2017-11-05 RX ADMIN — Medication 1 TABLET: at 17:44

## 2017-11-05 RX ADMIN — INSULIN ASPART 20 UNITS: 100 INJECTION, SOLUTION INTRAVENOUS; SUBCUTANEOUS at 08:25

## 2017-11-05 RX ADMIN — FUROSEMIDE 20 MG: 20 TABLET ORAL at 06:32

## 2017-11-05 RX ADMIN — Medication 100 MG: at 08:35

## 2017-11-05 RX ADMIN — GABAPENTIN 400 MG: 400 CAPSULE ORAL at 21:20

## 2017-11-05 RX ADMIN — DULOXETINE HYDROCHLORIDE 60 MG: 60 CAPSULE, DELAYED RELEASE ORAL at 06:30

## 2017-11-05 RX ADMIN — GABAPENTIN 400 MG: 400 CAPSULE ORAL at 16:00

## 2017-11-05 RX ADMIN — INSULIN ASPART 6 UNITS: 100 INJECTION, SOLUTION INTRAVENOUS; SUBCUTANEOUS at 17:41

## 2017-11-05 RX ADMIN — ASPIRIN 325 MG: 325 TABLET, COATED ORAL at 08:35

## 2017-11-05 RX ADMIN — INSULIN ASPART 25 UNITS: 100 INJECTION, SOLUTION INTRAVENOUS; SUBCUTANEOUS at 17:40

## 2017-11-05 RX ADMIN — GABAPENTIN 400 MG: 400 CAPSULE ORAL at 08:43

## 2017-11-05 RX ADMIN — LEVOTHYROXINE SODIUM 75 MCG: 75 TABLET ORAL at 06:30

## 2017-11-05 RX ADMIN — CYANOCOBALAMIN TAB 1000 MCG 1000 MCG: 1000 TAB at 08:35

## 2017-11-05 RX ADMIN — IPRATROPIUM BROMIDE AND ALBUTEROL SULFATE 3 ML: .5; 3 SOLUTION RESPIRATORY (INHALATION) at 19:51

## 2017-11-05 RX ADMIN — GUAIFENESIN AND CODEINE PHOSPHATE 5 ML: 100; 10 SOLUTION ORAL at 21:20

## 2017-11-05 RX ADMIN — INSULIN ASPART 6 UNITS: 100 INJECTION, SOLUTION INTRAVENOUS; SUBCUTANEOUS at 08:26

## 2017-11-05 RX ADMIN — IPRATROPIUM BROMIDE AND ALBUTEROL SULFATE 3 ML: .5; 3 SOLUTION RESPIRATORY (INHALATION) at 11:43

## 2017-11-05 RX ADMIN — GUAIFENESIN AND CODEINE PHOSPHATE 5 ML: 100; 10 SOLUTION ORAL at 06:37

## 2017-11-05 RX ADMIN — PANTOPRAZOLE SODIUM 40 MG: 40 TABLET, DELAYED RELEASE ORAL at 06:30

## 2017-11-05 RX ADMIN — METOPROLOL TARTRATE 25 MG: 25 TABLET, FILM COATED ORAL at 08:35

## 2017-11-05 RX ADMIN — DOCUSATE SODIUM 100 MG: 100 CAPSULE, LIQUID FILLED ORAL at 17:44

## 2017-11-05 RX ADMIN — METOPROLOL TARTRATE 25 MG: 25 TABLET, FILM COATED ORAL at 17:44

## 2017-11-05 NOTE — PLAN OF CARE
Problem: Patient Care Overview (Adult)  Goal: Plan of Care Review  Outcome: Ongoing (interventions implemented as appropriate)    11/05/17 0415   Coping/Psychosocial Response Interventions   Plan Of Care Reviewed With patient   Patient Care Overview   Progress progress toward functional goals as expected   Outcome Evaluation   Outcome Summary/Follow up Plan VSS, no complaints or issues this shift.

## 2017-11-05 NOTE — PROGRESS NOTES
"Hospitalist Team      Patient Care Team:  Chari Mercado MD as PCP - General  Chari Mercado MD as PCP - Family Medicine  Christos Rob MD as Surgeon (General Surgery)  German Wylie MD as Surgeon (Orthopedic Surgery)        Chief Complaint:    SOA and Cough    Subjective    Interval History and ROS:     Patient States Still coughing. ( I can say that it is no where near as bad as it was the first day I saw her.  Patient Complaints: cough  Patient Denies:  Nausea and vomiting  History taken from: patient      Objective    Vital Signs  Temp:  [97.8 °F (36.6 °C)-98.4 °F (36.9 °C)] 97.8 °F (36.6 °C)  Heart Rate:  [] 56  Resp:  [16-24] 16  BP: (104-178)/(51-84) 120/66  Oxygen Therapy  SpO2: 96 %  Pulse Oximetry Type: Continuous  O2 Device: room air  Flow (L/min): 2  Oximetry Probe Site Changed: Yes (forehead probe)}  Flowsheet Rows         First Filed Value    Admission Height  62.5\" (158.8 cm) Documented at 11/03/2017 1214    Admission Weight  211 lb 5 oz (95.9 kg) Documented at 11/03/2017 1214        Body mass index is 39.07 kg/(m^2).      Physical Exam:    Constitutional: Patient appears obese and coughing occasionally while I am in the room.  Not holding her abdomen when she coughs.   HEENT:   Head: Normocephalic and atraumatic.   Eyes:  Pupils are equal, round, and reactive to light. EOM are intact. Sclera are anicteric and non-injected.  Mouth and Throat: Patient has moist mucous membranes. Oropharynx is clear of any erythema or exudate.     Neck: Neck supple. No JVD present. No thyromegaly present. No lymphadenopathy present.  Cardiovascular: Regular rate, regular rhythm, S1 normal and S2 normal.  Exam reveals no gallop and no friction rub.  No murmur heard.  Pulmonary/Chest: Lungs are clear to auscultation bilaterally. Coughs occasionally today.   Deep breathing causes coughing. No respiratory distress. No wheezes. No rhonchi. No rales.   She brings up very little " mucous.  Abdominal: Soft. Bowel sounds are normal.   No distension and no mass. There is no hepatosplenomegaly. There is no tenderness.   Musculoskeletal: Normal Muscle tone  Extremities: No edema. Pulses are palpable in all 4 extremities.  Neurological: Patient is alert and oriented to person, place, and time. Cranial nerves II-XII are grossly intact with no focal deficits.  Skin: Skin is warm. No rash noted. Nails show no clubbing.  No cyanosis or erythema.              Results Review:     I reviewed the patient's new clinical results.    Lab Results (last 24 hours)     Procedure Component Value Units Date/Time    POC Glucose Fingerstick [[id number]]  (Abnormal) Collected:  11/04/17 1622    Specimen:  Blood Updated:  11/04/17 1628     Glucose 447 (H) mg/dL     Narrative:       Critical repeat  Meter: [id number] : [id number] Ynes [employer]    Glucose, Random [[id number]]  (Abnormal) Collected:  11/04/17 1643    Specimen:  Blood Updated:  11/04/17 1715     Glucose 482 (C) mg/dL     Respiratory Panel, PCR - Swab, Nasopharynx [[id number]]  (Normal) Collected:  11/03/17 2036    Specimen:  Swab from Nasopharynx Updated:  11/04/17 1810     ADENOVIRUS, PCR Not Detected     Coronavirus 229E Not Detected     Coronavirus HKU1 Not Detected     Coronavirus NL63 Not Detected     Coronavirus OC43 Not Detected     Human Metapneumovirus Not Detected     Human Rhinovirus/Enterovirus Not Detected     Influenza B PCR Not Detected     Parainfluenza Virus 1 Not Detected     Parainfluenza Virus 2 Not Detected     Parainfluenza Virus 3 Not Detected     Parainfluenza Virus 4 Not Detected     Bordetella pertussis pcr Not Detected     Influenza 2009 H1N1 by PCR Not Detected     Chlamydophila pneumoniae PCR Not Detected     Mycoplasma pneumo by PCR Not Detected     Influenza A PCR Not Detected     Influenza A H3 Not Detected     Influenza A H1 Not Detected     RSV, PCR Not Detected    POC Glucose Fingerstick [[id number]]   (Abnormal) Collected:  11/04/17 2009    Specimen:  Blood Updated:  11/04/17 2016     Glucose 327 (H) mg/dL     Narrative:       NOTIFIED RN Meter: [id number] : [id number] [facility]    POC Glucose Fingerstick [[id number]]  (Abnormal) Collected:  11/05/17 0807    Specimen:  Blood Updated:  11/05/17 0817     Glucose 284 (H) mg/dL     Narrative:       Meter: [id number] : [id number] [employer] NA CERT    POC Glucose Fingerstick [[id number]]  (Abnormal) Collected:  11/05/17 1119    Specimen:  Blood Updated:  11/05/17 1145     Glucose 143 (H) mg/dL     Narrative:       Meter: [id number] : [id number] [employer] NA CERT          Imaging Results (last 24 hours)     ** No results found for the last 24 hours. **          Xray not reviewed personally by physician.      ECG not reviewed personally by physician  ECG/EMG Results (most recent)     Procedure Component Value Units Date/Time    ECG 12 Lead [[id number]] Collected:  11/03/17 1228     Updated:  11/03/17 1411    Narrative:       RR Interval= 706 ms  OR Interval= 160 ms  QRSD Interval= 136 ms  QT Interval= 380 ms  QTc Interval= 452 ms  Heart Rate= 85 ms  P Axis= 49 deg  QRS Axis= -9 deg  T Wave Axis= -9 deg  I: 40 Axis= -12 deg  T: 40 Axis= 165 deg  ST Axis= 9 deg  SINUS RHYTHM  LEFT ANTERIOR FASCICULAR BLOCK  RIGHT BUNDLE BRANCH BLOCK  NO SIGNIFICANT CHANGE FROM PREVIOUS ECG  Electronically Signed by:  Kehinde Sanon ([facility]) 03-Nov-2017 14:08:24  Date and Time of Study: 2017-11-03 12:28:44          Medication Review:   I have reviewed the patient's current medication list    Current Facility-Administered Medications:   •  acetaminophen (TYLENOL) tablet 650 mg, 650 mg, Oral, Q6H PRN, Saulo Mak MD, 650 mg at 11/04/17 1437  •  apixaban (ELIQUIS) tablet 2.5 mg, 2.5 mg, Oral, BID, Saulo Mak MD, 2.5 mg at 11/05/17 0835  •  aspirin tablet 325 mg, 325 mg, Oral, Daily, Saulo Mak MD, 325 mg at 11/05/17 0835  •  atorvastatin (LIPITOR) tablet 10 mg,  10 mg, Oral, Nightly, Saulo Mak MD, 10 mg at 11/04/17 2112  •  calcium carb-cholecalciferol 600-800 MG-UNIT tablet 1 tablet, 1 tablet, Oral, BID With Meals, Saulo Mak MD, 1 tablet at 11/05/17 0836  •  cyclobenzaprine (FLEXERIL) tablet 10 mg, 10 mg, Oral, TID PRN, Saulo Mak MD  •  dextrose (D50W) solution 25 g, 25 g, Intravenous, Q15 Min PRN, Renetta Lua DO  •  dextrose (GLUTOSE) oral gel 15 g, 15 g, Oral, Q15 Min PRN, Renetta Lua DO  •  docusate sodium (COLACE) capsule 100 mg, 100 mg, Oral, BID, Saulo Mak MD, 100 mg at 11/05/17 0835  •  DULoxetine (CYMBALTA) DR capsule 60 mg, 60 mg, Oral, QA, Saulo Mak MD, 60 mg at 11/05/17 0630  •  furosemide (LASIX) tablet 20 mg, 20 mg, Oral, QAM, Saulo Mak MD, 20 mg at 11/05/17 0632  •  gabapentin (NEURONTIN) capsule 400 mg, 400 mg, Oral, TID, Saulo Mak MD, 400 mg at 11/05/17 0843  •  glucagon (GLUCAGEN) injection 1 mg, 1 mg, Subcutaneous, Q15 Min PRN, Renetta Lua DO  •  guaiFENesin-codeine (ROMILAR-AC) syrup 5 mL, 5 mL, Oral, Q6H PRN, Renetta Lua DO, 5 mL at 11/05/17 0637  •  HYDROcodone-acetaminophen (NORCO) 5-325 MG per tablet 1 tablet, 1 tablet, Oral, Q12H PRN, Saulo Mak MD  •  insulin aspart (novoLOG) injection 0-7 Units, 0-7 Units, Subcutaneous, 4x Daily AC & at Bedtime, Saulo Mak MD, 6 Units at 11/05/17 0826  •  insulin aspart (novoLOG) injection 20 Units, 20 Units, Subcutaneous, QAM AC, Saulo Mak MD, 20 Units at 11/05/17 0825  •  insulin aspart (novoLOG) injection 25 Units, 25 Units, Subcutaneous, Daily Before Lunch, Saulo Mak MD  •  insulin aspart (novoLOG) injection 25 Units, 25 Units, Subcutaneous, Daily Before Supper, Saulo Mak MD  •  ipratropium-albuterol (DUO-NEB) nebulizer solution 3 mL, 3 mL, Nebulization, 4x Daily - RT, Saulo Mak MD, 3 mL at 11/05/17 1143  •  levothyroxine (SYNTHROID, LEVOTHROID) tablet 75 mcg, 75 mcg, Oral, QAM, Saulo Mak MD, 75 mcg  at 11/05/17 0630  •  melatonin tablet 5 mg, 5 mg, Oral, Nightly PRN, Saulo Mak MD, 5 mg at 11/04/17 2113  •  metoprolol tartrate (LOPRESSOR) tablet 25 mg, 25 mg, Oral, BID, Saulo Mak MD, 25 mg at 11/05/17 0835  •  pantoprazole (PROTONIX) EC tablet 40 mg, 40 mg, Oral, Q AM, Saulo Mak MD, 40 mg at 11/05/17 0630  •  pneumococcal polysaccharide 23-valent (PNEUMOVAX-23) vaccine 0.5 mL, 0.5 mL, Intramuscular, During Hospitalization, Saulo Mak MD  •  potassium chloride (K-DUR) CR tablet 10 mEq, 10 mEq, Oral, QAM, Saulo Mak MD, Stopped at 11/05/17 0631  •  sodium chloride 0.9 % flush 1-10 mL, 1-10 mL, Intravenous, PRN, Saulo Mak MD  •  sodium chloride 0.9 % infusion 40 mL, 40 mL, Intravenous, PRN, Saulo Mak MD  •  thiamine (VITAMIN B-1) tablet 100 mg, 100 mg, Oral, Daily, Saulo Mak MD, 100 mg at 11/05/17 0835  •  vitamin B-12 (CYANOCOBALAMIN) tablet 1,000 mcg, 1,000 mcg, Oral, Daily, Saulo Mak MD, 1,000 mcg at 11/05/17 0835      Assessment/Plan      Per Dr. Acuña    1. Acute hypoxic respiratory failure etiology is not clear arterial blood gas was done on 2 L she obviously has some hypoxia but an SPO2 of 78% does not come close to matching the PO2 with 2 L nasal cannula O2.  Yesterday's exercise oximetry she was 94% on room air and actually increased to 96% before she had to terminate due to weakness and dizziness.  I'm wondering if the pulse oximeter was accurate if she was hypotensive it may not have been reading accurately.  2. Persistent/chronic cough of a number of months duration nonproductive.  No history of lung disease.  At some point I would like to get some lung functions to make sure she doesn't have cough variant asthma but just the hacking nature of her cough makes me very suspicious that it may be related to her lisinopril.  I think we should just keep her off ace inhibitors for right now and give this a little bit of time.  3. Dizziness particularly when  she gets up this certainly sounds like orthostatic hypotension looks like she had a 40 mm drop in her systolic blood pressure today and that is off of lisinopril I wonder how much she was dropping previously.  This certainly may explain her symptoms . question some autonomic dysfunction with her diabetes.      4. 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 Obstructive sleep apnea Pap intolerant, O2 with sleep.  She has a cpap machine at home and I encouraged her to clean it up and get it out and try to use it or seek recommendations from her sleep physician.  We will order her 2 L NC at night as she does at  Home.  Getting a nocturnal study tonight on the 2 L.  7. 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 Anemia chronic disease  9. Morbid obesity:  Body mass index is 39.07 kg/(m^2).      She is not on oxygen now.  I will get a nocturnal study tonight.  As above we are assuming this cough is due to lisinopril.  After discharge we will recommend evaluation of her CPAP and impress upon her the need to use it.      Plan for disposition:  AD in am.    Renetta Lua DO  11/05/17  3:06 PM      Time: 30 min

## 2017-11-05 NOTE — PLAN OF CARE
Problem: Pain, Acute (Adult)  Goal: Identify Related Risk Factors and Signs and Symptoms  Outcome: Ongoing (interventions implemented as appropriate)    11/05/17 1321   Pain, Acute   Related Risk Factors (Acute Pain) infection   Signs and Symptoms (Acute Pain) fatigue/weakness  (Patient has not request pain medication thus far.)

## 2017-11-05 NOTE — PROGRESS NOTES
LOS: 2 days   Patient Care Team:  Chari Mercado MD as PCP - General  Chari Mercado MD as PCP - Family Medicine  Christos Rob MD as Surgeon (General Surgery)  German Wylie MD as Surgeon (Orthopedic Surgery)    Subjective     Patient reports she still has some cough nonproductive unchanged really from yesterday.  She still has dizziness and weakness yesterday she had to terminate her exercise oximetry due to this.  This morning when they got her up she got dizzy and lightheaded looks like her systolic blood pressure fell 40 mm they did not record her pulse changes.    Review of Systems:          Objective     Vital Signs  Vital Sign Min/Max for last 24 hours  Temp  Min: 97.1 °F (36.2 °C)  Max: 98.4 °F (36.9 °C)   BP  Min: 104/51  Max: 178/80   Pulse  Min: 82  Max: 119   Resp  Min: 18  Max: 26   SpO2  Min: 92 %  Max: 98 %   Flow (L/min)  Min: 1  Max: 2   No Data Recorded        Ventilator/Non-Invasive Ventilation Settings     None                       Body mass index is 39.07 kg/(m^2).  I/O last 3 completed shifts:  In: 840 [P.O.:840]  Out: 700 [Urine:700]           Physical Exam:  General Appearance: Well-developed obese white female resting comfortably in bed she was sleeping on 2 L nasal cannula O2 on my arrival with saturations of 96% when she woke up and went up to 100%.  Observing her sleep for a minute or so she clearly has obstructive sleep apnea  Eyes: Conjunctiva are clear and anicteric  ENT: Mucous membranes moist no exudates  Neck: No jugular venous distention trachea midline  Lungs: Clear nonlabored symmetric expansion.  When she was taking deep breaths this morning she did not have the coughing paroxysmal she had yesterday course this is just a couple second period.  Cardiac: Regular rate and rhythm no murmur  Abdomen: Soft no palpable organomegaly obese  : Not examined  Musc/Skel: Grossly normal  Skin: No jaundice, no rashes, no petechiae  Neuro: Alert oriented  cooperative following commands grossly intact  Extremities/P Vascular: No clubbing cyanosis trace ankle edema bilaterally palpable radial dorsalis pedis pulses  MSE: Seems in reasonably good spirits       Labs:    Results from last 7 days  Lab Units 11/04/17  1643 11/04/17  1143 11/04/17  0406 11/03/17  1235   GLUCOSE mg/dL 482* 501* 379* 106*   SODIUM mmol/L  --   --  139 142   POTASSIUM mmol/L  --   --  5.5* 4.7   CO2 mmol/L  --   --  24.9 27.9   CHLORIDE mmol/L  --   --  101 102   ANION GAP mmol/L  --   --  13.1 12.1   CREATININE mg/dL  --   --  2.19* 2.17*   BUN mg/dL  --   --  48* 42*   BUN / CREAT RATIO   --   --  21.9 19.4   CALCIUM mg/dL  --   --  9.2 9.3   EGFR IF NONAFRICN AM mL/min/1.73  --   --  22* 22*   ALK PHOS U/L  --   --  131* 130*   TOTAL PROTEIN g/dL  --   --  7.1 7.2   ALT (SGPT) U/L  --   --  7 6   AST (SGOT) U/L  --   --  10 8   BILIRUBIN mg/dL  --   --  0.4 0.4   ALBUMIN g/dL  --   --  3.90 3.70   GLOBULIN gm/dL  --   --  3.2 3.5   A/G RATIO g/dL  --   --  1.2 1.1     Estimated Creatinine Clearance: 24.1 mL/min (by C-G formula based on Cr of 2.19).        Results from last 7 days  Lab Units 11/04/17  0406 11/03/17  1235   WBC 10*3/mm3 8.32 8.26   RBC 10*6/mm3 3.29* 3.13*   HEMOGLOBIN g/dL 10.5* 10.1*   HEMATOCRIT % 32.4* 31.2*   MCV fL 98.5 99.7*   MCH pg 31.9* 32.3*   MCHC g/dL 32.4 32.4   RDW % 16.8* 17.1*   RDW-SD fl 60.7* 62.5*   MPV fL 12.1* 12.1*   PLATELETS 10*3/mm3 323 308   NEUTROPHIL % % 82.3* 55.4   LYMPHOCYTE % % 10.5* 36.3   MONOCYTES % % 0.4* 3.3   EOSINOPHIL % % 0.1 3.8   BASOPHIL % % 0.4 0.7   IMM GRAN % % 6.3* 0.5   NEUTROS ABS 10*3/mm3 6.86 4.58   LYMPHS ABS 10*3/mm3 0.87 3.00   MONOS ABS 10*3/mm3 0.03 0.27   EOS ABS 10*3/mm3 0.01* 0.31*   BASOS ABS 10*3/mm3 0.03 0.06   IMMATURE GRANS (ABS) 10*3/mm3 0.52* 0.04*   NRBC /100 WBC 0.0 0.0       Results from last 7 days  Lab Units 11/04/17  0000   PH, ARTERIAL pH units 7.408   PO2 ART mm Hg 87.7   PCO2, ARTERIAL mm Hg 37.7   HCO3  ART mmol/L 19.8*       Results from last 7 days  Lab Units 11/04/17  0407 11/04/17  0005 11/03/17  1814   TROPONIN T ng/mL <0.010 <0.010 <0.010       Results from last 7 days  Lab Units 11/04/17  0406 11/03/17  1235   PROBNP pg/mL 214.0 86.7       Results from last 7 days  Lab Units 11/03/17  1235   TSH mIU/mL 3.160           Results from last 7 days  Lab Units 11/03/17  1235   INR  1.18*     Microbiology Results (last 10 days)     Procedure Component Value - Date/Time    Respiratory Panel, PCR - Swab, Nasopharynx [[id number]]  (Normal) Collected:  11/03/17 2036    Lab Status:  Final result Specimen:  Swab from Nasopharynx Updated:  11/04/17 1810     ADENOVIRUS, PCR Not Detected     Coronavirus 229E Not Detected     Coronavirus HKU1 Not Detected     Coronavirus NL63 Not Detected     Coronavirus OC43 Not Detected     Human Metapneumovirus Not Detected     Human Rhinovirus/Enterovirus Not Detected     Influenza B PCR Not Detected     Parainfluenza Virus 1 Not Detected     Parainfluenza Virus 2 Not Detected     Parainfluenza Virus 3 Not Detected     Parainfluenza Virus 4 Not Detected     Bordetella pertussis pcr Not Detected     Influenza 2009 H1N1 by PCR Not Detected     Chlamydophila pneumoniae PCR Not Detected     Mycoplasma pneumo by PCR Not Detected     Influenza A PCR Not Detected     Influenza A H3 Not Detected     Influenza A H1 Not Detected     RSV, PCR Not Detected    Influenza Antigen, Rapid - Swab, Nasopharynx [[id number]]  (Normal) Collected:  11/03/17 1254    Lab Status:  Final result Specimen:  Swab from Nasopharynx Updated:  11/03/17 1326     Influenza A Ag, EIA Negative     Influenza B Ag, EIA Negative                apixaban 2.5 mg Oral BID   aspirin 325 mg Oral Daily   atorvastatin 10 mg Oral Nightly   calcium carb-cholecalciferol 1 tablet Oral BID With Meals   docusate sodium 100 mg Oral BID   DULoxetine 60 mg Oral QAM   furosemide 20 mg Oral QAM   gabapentin 400 mg Oral TID   insulin aspart 0-7 Units  Subcutaneous 4x Daily AC & at Bedtime   insulin aspart 20 Units Subcutaneous QAM AC   insulin aspart 25 Units Subcutaneous Daily Before Lunch   insulin aspart 25 Units Subcutaneous Daily Before Supper   ipratropium-albuterol 3 mL Nebulization 4x Daily - RT   levothyroxine 75 mcg Oral QAM   metoprolol tartrate 25 mg Oral BID   pantoprazole 40 mg Oral Q AM   potassium chloride 10 mEq Oral QAM   thiamine 100 mg Oral Daily   cyanocobalamin 1,000 mcg Oral Daily          Diagnostics:  Ct Chest Without Contrast    Result Date: 11/4/2017  CHEST CT WITHOUT CONTRAST  HISTORY: Shortness of breath with hypoxia. FINDINGS noted in the doctor's office today  TECHNIQUE: Axial images were obtained without contrast and evaluated at lung and mediastinal windows. Radiation dose reduction techniques were utilized, including automated exposure control and exposure modulation based on body size.  FINDINGS: Chest images at mediastinal window show no enlarged mediastinal or hilar lymph nodes. The aorta is mildly tortuous and ectatic. No pleural or pericardial fluid.  Images at lung window show mildly prominent interstitial markings at both lung bases consistent with either mild basilar atelectasis or fibrosis. The upper lung fields are clear. No bronchiectasis.      Mild bibasilar atelectasis versus fibrosis. No active process noted.  This report was finalized on 11/4/2017 7:25 AM by Dr. Casey Quiles MD.      Nm Lung Ventilation Perfusion    Result Date: 11/3/2017  VENTILATION/PERFUSION LUNG SCAN, 11/03/2017:  HISTORY: 77-year-old female in the ED 2 1/2 week history of worsening chronic shortness of air. D-dimer is normal.  DOSE: *  27.2 mCi technetium labeled DTPA inhaled in aerosol. *  5.9 mCi technetium labeled MAA administered intravenously.  COMPARISON: Chest x-ray today.  FINDINGS: Mildly heterogeneous distribution of inhaled tracer in both lungs on ventilation images. Perfusion images are normal. No unmatched perfusion defect is  identified to suggest pulmonary embolism.      Low probability of pulmonary embolism.  This report was finalized on 11/3/2017 4:26 PM by Dr. Jacques Bates MD.      Xr Chest 1 View    Result Date: 11/3/2017  CHEST X-RAY, 11/03/2017:  HISTORY: 77-year-old female in the ED complaining of two week history of shortness of air.  TECHNIQUE: AP portable upright chest x-ray.  FINDINGS: Low lung volumes. Mild basilar atelectasis. The lungs appear clear. Heart size and pulmonary vascularity are normal. No visible pleural effusion.      1. No active disease. 2. Low lung volumes with mild bibasilar atelectasis.  This report was finalized on 11/3/2017 1:03 PM by Dr. Jacques Bates MD.      Results for orders placed during the hospital encounter of 10/05/17   Adult Transthoracic Echo Complete W/ Cont if Necessary Per Protocol    Narrative · Left ventricular systolic function is normal. Calculated EF = 61.6%.   Estimated EF was in agreement with the calculated EF. Normal left   ventricular cavity size noted. All left ventricular wall segments contract   normally. Left ventricular wall thickness is consistent with borderline   concentric hypertrophy. Left ventricular diastolic dysfunction is noted   (grade I) consistent with impaired relaxation.  · Normal right ventricular systolic function noted. Right ventricular   cavity is borderline dilated.              Active Hospital Problems (** Indicates Principal Problem)    Diagnosis Date Noted   • Hypoxia [R09.02] 11/03/2017      Resolved Hospital Problems    Diagnosis Date Noted Date Resolved   No resolved problems to display.         Assessment/Plan     1. Acute hypoxic respiratory failure etiology is not clear arterial blood gas was done on 2 L she obviously has some hypoxia but an SPO2 of 78% does not come close to matching the PO2 with 2 L nasal cannula O2.  Yesterday's exercise oximetry she was 94% on room air and actually increased to 96% before she had to terminate due to  weakness and dizziness.  I'm wondering if the pulse oximeter was accurate if she was hypotensive it may not have been reading accurately.  2. Persistent/chronic cough of a number of months duration nonproductive.  No history of lung disease.  At some point I would like to get some lung functions to make sure she doesn't have cough variant asthma but just the hacking nature of her cough makes me very suspicious that it may be related to her lisinopril.  I think we should just keep her off ace inhibitors for right now and give this a little bit of time.  3. Dizziness particularly when she gets up this certainly sounds like orthostatic hypotension looks like she had a 40 mm drop in her systolic blood pressure today and that is off of lisinopril I wonder how much she was dropping previously.  This certainly may explain her symptoms . question some autonomic dysfunction with her diabetes.      4. 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 Obstructive sleep apnea Pap intolerant, O2 with sleep  7. 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 Anemia chronic disease  9. Morbid obesity    Plan for disposition:    Mihir Acuña MD  11/05/17  7:51 AM    Time:

## 2017-11-06 VITALS
WEIGHT: 213.6 LBS | BODY MASS INDEX: 39.31 KG/M2 | RESPIRATION RATE: 20 BRPM | DIASTOLIC BLOOD PRESSURE: 65 MMHG | OXYGEN SATURATION: 94 % | TEMPERATURE: 97.9 F | SYSTOLIC BLOOD PRESSURE: 113 MMHG | HEART RATE: 68 BPM | HEIGHT: 62 IN

## 2017-11-06 LAB
GLUCOSE BLDC GLUCOMTR-MCNC: 221 MG/DL (ref 70–130)
GLUCOSE BLDC GLUCOMTR-MCNC: 283 MG/DL (ref 70–130)

## 2017-11-06 PROCEDURE — 94799 UNLISTED PULMONARY SVC/PX: CPT

## 2017-11-06 PROCEDURE — 99239 HOSP IP/OBS DSCHRG MGMT >30: CPT | Performed by: NURSE PRACTITIONER

## 2017-11-06 PROCEDURE — 25010000002 PNEUMOCOCCAL VAC POLYVALENT PER 0.5 ML: Performed by: INTERNAL MEDICINE

## 2017-11-06 PROCEDURE — G0009 ADMIN PNEUMOCOCCAL VACCINE: HCPCS | Performed by: INTERNAL MEDICINE

## 2017-11-06 PROCEDURE — 92610 EVALUATE SWALLOWING FUNCTION: CPT

## 2017-11-06 PROCEDURE — 82962 GLUCOSE BLOOD TEST: CPT

## 2017-11-06 PROCEDURE — 63710000001 INSULIN ASPART PER 5 UNITS: Performed by: INTERNAL MEDICINE

## 2017-11-06 PROCEDURE — 90732 PPSV23 VACC 2 YRS+ SUBQ/IM: CPT | Performed by: INTERNAL MEDICINE

## 2017-11-06 RX ORDER — ACETAMINOPHEN 325 MG/1
650 TABLET ORAL EVERY 6 HOURS PRN
Qty: 40 TABLET | Refills: 0
Start: 2017-11-06

## 2017-11-06 RX ORDER — SODIUM CHLORIDE 9 MG/ML
INJECTION, SOLUTION INTRAVENOUS
Status: COMPLETED
Start: 2017-11-06 | End: 2017-11-06

## 2017-11-06 RX ADMIN — CYANOCOBALAMIN TAB 1000 MCG 1000 MCG: 1000 TAB at 08:48

## 2017-11-06 RX ADMIN — APIXABAN 2.5 MG: 2.5 TABLET, FILM COATED ORAL at 08:48

## 2017-11-06 RX ADMIN — SODIUM CHLORIDE 500 ML: 9 INJECTION, SOLUTION INTRAVENOUS at 09:58

## 2017-11-06 RX ADMIN — IPRATROPIUM BROMIDE AND ALBUTEROL SULFATE 3 ML: .5; 3 SOLUTION RESPIRATORY (INHALATION) at 11:37

## 2017-11-06 RX ADMIN — POTASSIUM CHLORIDE 10 MEQ: 750 TABLET, FILM COATED, EXTENDED RELEASE ORAL at 06:46

## 2017-11-06 RX ADMIN — DOCUSATE SODIUM 100 MG: 100 CAPSULE, LIQUID FILLED ORAL at 08:48

## 2017-11-06 RX ADMIN — IPRATROPIUM BROMIDE AND ALBUTEROL SULFATE 3 ML: .5; 3 SOLUTION RESPIRATORY (INHALATION) at 07:23

## 2017-11-06 RX ADMIN — METOPROLOL TARTRATE 25 MG: 25 TABLET, FILM COATED ORAL at 08:48

## 2017-11-06 RX ADMIN — Medication 100 MG: at 08:48

## 2017-11-06 RX ADMIN — GUAIFENESIN AND CODEINE PHOSPHATE 5 ML: 100; 10 SOLUTION ORAL at 11:20

## 2017-11-06 RX ADMIN — INSULIN ASPART 25 UNITS: 100 INJECTION, SOLUTION INTRAVENOUS; SUBCUTANEOUS at 12:49

## 2017-11-06 RX ADMIN — INSULIN ASPART 6 UNITS: 100 INJECTION, SOLUTION INTRAVENOUS; SUBCUTANEOUS at 12:49

## 2017-11-06 RX ADMIN — ASPIRIN 325 MG: 325 TABLET, COATED ORAL at 08:48

## 2017-11-06 RX ADMIN — GABAPENTIN 400 MG: 400 CAPSULE ORAL at 08:55

## 2017-11-06 RX ADMIN — FUROSEMIDE 20 MG: 20 TABLET ORAL at 06:46

## 2017-11-06 RX ADMIN — DULOXETINE HYDROCHLORIDE 60 MG: 60 CAPSULE, DELAYED RELEASE ORAL at 06:46

## 2017-11-06 RX ADMIN — Medication 1 TABLET: at 08:48

## 2017-11-06 RX ADMIN — INSULIN ASPART 20 UNITS: 100 INJECTION, SOLUTION INTRAVENOUS; SUBCUTANEOUS at 08:55

## 2017-11-06 RX ADMIN — PANTOPRAZOLE SODIUM 40 MG: 40 TABLET, DELAYED RELEASE ORAL at 06:46

## 2017-11-06 RX ADMIN — LEVOTHYROXINE SODIUM 75 MCG: 75 TABLET ORAL at 06:46

## 2017-11-06 RX ADMIN — PNEUMOCOCCAL VACCINE POLYVALENT 0.5 ML
25; 25; 25; 25; 25; 25; 25; 25; 25; 25; 25; 25; 25; 25; 25; 25; 25; 25; 25; 25; 25; 25; 25 INJECTION, SOLUTION INTRAMUSCULAR; SUBCUTANEOUS at 12:55

## 2017-11-06 RX ADMIN — INSULIN ASPART 4 UNITS: 100 INJECTION, SOLUTION INTRAVENOUS; SUBCUTANEOUS at 08:54

## 2017-11-06 NOTE — NURSING NOTE
11/6/17 @ 1035- I SPOKE WITH PATIENT AT BEDSIDE- SHE IS AWARE OF ANTICIPATED DISCHARGE TODAY AND AGREEABLE- SHE STATES HER 2 SONS AND A DAUGHTER IN LAW LIVE WITH HER AND FEELS FINE ABOUT GOING HOME- SHE IS AWARE OF NEED FOR HOME OXYGEN AT  AND CHOOSES [facility]- SHE IS AWARE OF NEED OF HH SERVICES AND AGREEABLE TO [facility]- I SPOKE WITH BOTH KIRT AND ALLISON FROM [facility] AND REFERRAL GIVEN AND INFO FAXED- VERIFIED IT WAS RECEIVED- ALLISON WILL CONTACT PATIENT FOR HOME SET UP- HE IS AWARE PATIENT WILL DC TODAY- I  SPOKE WITH CARLOS ALBERTO FROM [facility]  AND REFERRAL GIVEN- SHE ACCEPTED PATIENT FOR CARE- SHE WILL ATTEMPT TO SEE PATIENT HERE PRIOR TO HER DISCHARGE- I SPOKE WITH PATIENT AND TOLD HER TO CALL BOTH HER HH AND [facility] WHEN SHE ARRIVES HOME  TO HAVE OXYGEN SET UP AND APPOINTMENT MADE FOR HH VISIT- SHE VERBALIZED UNDERSTANDING

## 2017-11-06 NOTE — THERAPY DISCHARGE NOTE
Acute Care - Speech Language Pathology   Swallow Eval/Discharge VICKY Sharma     Patient Name: Joya Singh  : 1940  MRN: [id number]  Today's Date: 2017        Referring Physician: Davie      Admit Date: 11/3/2017    Visit Dx:    ICD-10-CM ICD-9-CM   1. Hypoxia R09.02 799.02   2. Diabetes mellitus type 2 in obese  E11.69 250.00    E66.9 278.00     Patient Active Problem List   Diagnosis   • Abdominal pain   • Deep vein thrombosis of lower extremity   • Transient global amnesia   • Arthritis   • Chronic back pain   • Chronic coronary artery disease   • Chronic anemia   • Chronic kidney disease   • Constipation   • Cough   • Depression   • Type 2 diabetes mellitus with neurologic complication   • Brittle diabetes mellitus   • Diarrhea   • Difficulty in urination   • Dizziness   • Dysuria   • Fall in home   • Fatigue   • Gastroparesis   • Hyperlipidemia   • Hypertension   • Insomnia with sleep apnea   • Spasm   • Nasal congestion   • Nausea   • Obstructive sleep apnea syndrome   • Peripheral neuropathy   • Poor balance   • Pulmonary hypertension   • Speech disturbance   • Sinusitis   • Swelling of lower extremity   • Viral infection   • Warfarin anticoagulation   • Diabetic gastroparesis   • Grief   • Osteoarthrosis, localized, primary, shoulder region   • Rotator cuff tendonitis   • Stress at home   • Warfarin toxicity   • Acquired hypothyroidism   • Anxiety   • Encounter for other specified aftercare   • Gastroesophageal reflux disease   • History of biliary T-tube placement   • Left leg pain   • Subtherapeutic anticoagulation   • CKD stage 3 due to type 2 diabetes mellitus   • Mechanical knee pain   • Complex tear of medial meniscus of left knee as current injury   • Insufficiency fracture of tibia   • DVT of lower extremity (deep venous thrombosis) (aka DVT OF LOWER EXTREMITY (DEEP VENOUS THROMBOSIS))   • Primary osteoarthritis of left knee   • Surgical follow-up care   • Hypoxia     Past  Medical History:   Diagnosis Date   • Allergic rhinitis    • Anxiety    • Arthritis    • Bell's palsy    • Cancer     REMOVED FROM ARM   • Depression    • Diabetes mellitus     LAST A1C 6   • Disease of thyroid gland    • GERD (gastroesophageal reflux disease)    • H/O blood clots     LEFT LEG 7 OR 8 YEARS AGO   • Hyperlipidemia    • Hypertension    • Kidney disease    • Left knee pain     scheduled for sx   • AARON (obstructive sleep apnea)     DOESNT WEAR REGULARLY   • PONV (postoperative nausea and vomiting)    • Stroke    • TIA (transient ischemic attack)     LAST TIA JULY 2017     Past Surgical History:   Procedure Laterality Date   • BACK SURGERY      HARDWARE   • CHOLECYSTECTOMY      OPEN   • COLONOSCOPY  2011    due for repeat in 2021   • HYSTERECTOMY      PARTIAL    • SPINE SURGERY     • UPPER GASTROINTESTINAL ENDOSCOPY  2014    gastritis.  done by dr. hastings          SWALLOW EVALUATION (last 72 hours)      Swallow Evaluation       11/06/17 1045    Rehab Evaluation    Document Type evaluation  -AD    Subjective Information no complaints;agree to therapy  -AD    Patient Effort, Rehab Treatment good  -AD    Symptoms Noted During/After Treatment none  -AD    General Information    Patient Profile Review yes  -AD    Onset of Illness/Injury 11/03/17  -AD    Referring Physician Chanell/Jarod  -AD    Subjective Patient Observations Pt seen at bedside, alert and cooperative.   -AD    Pertinent History Of Current Problem Pt admitted with hypoxic respiratory failure, orthostatic hypotension and chronic cough.   -AD    Current Diet Limitations thin liquids;regular solid  -AD    Precautions/Limitations, Vision WFL  -AD    Precautions/Limitations, Hearing WFL  -AD    Prior Level of Function- Communication functional in all spheres  -AD    Prior Level of Function- Swallowing no diet consistency restrictions;safe, efficient swallowing in all situations  -AD    Plans/Goals Discussed With patient;agreed upon  -AD     Barriers to Rehab none identified  -AD    Clinical Impression    Patient's Goals For Discharge return home  -AD    SLP Swallowing Diagnosis other (see comments)   swallow WFL  -AD    Criteria for Skilled Therapeutic Interventions Met no problems identified which require skilled intervention  -AD    FCM, Swallowing 7-->Level 7  -AD    Therapy Frequency evaluation only  -AD    SLP Diet Recommendation regular textures;thin liquids  -AD    Recommended Feeding/Eating Techniques maintain upright posture during/after eating for 30 mins  -AD    SLP Rec. for Method of Medication Administration meds whole with thin liquid  -AD    Anticipated Discharge Disposition home  -AD    Pain Assessment    Pain Assessment No/denies pain  -AD    Cognitive Assessment/Intervention    Current Cognitive/Communication Assessment functional  -AD    Orientation Status oriented x 4  -AD    Follows Commands/Answers Questions able to follow single-step instructions;able to follow multi-step instructions;100% of the time  -AD    Personal Safety WNL/WFL  -AD    Oral Motor Structure and Function    Oral Motor Anatomy and Physiology patient demonstrates anatomy and physiology that is WNL  -AD    Dentition Assessment present and adequate  -AD    Secretion Management WNL/WFL  -AD    Mucosal Quality moist, healthy  -AD    Velar Elevation bilateral:;WFL (within functional limits)  -AD    Volitional Swallow no difficulties initiating volitional swallow  -AD    Volitional Cough no difficulties initiating volitional cough  -AD    Oral Musculature General Assessment WFL (within functional limits)  -AD    General Feeding/Swallowing Observations    Current Feeding Method oral feeding methods  -AD    Observations of Self Feeding Skills appropriate self feeding skills observed  -AD    Observations of Posture During Feeding Upright near 90 degrees   -AD    Clinical Swallow Exam    Mode of Presentation self fed;straw;spoon   thins, puree and solids  -AD    Oral  Phase Results intact oral phase without signs of dysfunction  -AD    Pharyngeal Phase Results no signs/symptoms of pharyngeal impairment  -AD    Summary of Clinical Exam Pt presents with a functional oropharyngeal swallow. No oral residue or oral prep concerns with any consistency and no pharyngeal signs/symptoms or clincial signs of aspiration. Rec continue with a regular diet with thin liquids and no further therapy indicated at this time.   -AD    Swallow Recommendations    Eating Assistance no assistance needed with self eating  -AD    Oral Care oral care with toothbrush and dentifrice BID and PRN  -AD    Modified Eating Strategies upright positioning 90 degrees  -AD    Other Recommendations meds whole  -AD    Recommended Diet regular textures;thin liquids  -AD    Positioning and Restraints    Pre-Treatment Position in bed  -AD    Post Treatment Position bed  -AD    In Bed call light within reach;encouraged to call for assist;side rails up x2  -AD      User Key  (r) = Recorded By, (t) = Taken By, (c) = Cosigned By    Initials Name Effective Dates    AD Dahiana Villa, MS CCC-SLP 06/22/16 -         EDUCATION  The patient has been educated in the following areas:   Clinical Swallow Eval.    SLP Recommendation and Plan  SLP Swallowing Diagnosis: other (see comments) (swallow WFL)  SLP Diet Recommendation: regular textures, thin liquids  Recommended Feeding/Eating Techniques: maintain upright posture during/after eating for 30 mins  SLP Rec. for Method of Medication Administration: meds whole with thin liquid  Criteria for Skilled Therapeutic Interventions Met: no problems identified which require skilled intervention  Anticipated Discharge Disposition: home  Therapy Frequency: evaluation only    Plan of Care Review  Plan Of Care Reviewed With: patient  Outcome Summary/Follow up Plan: SLP/Clinical Swallow Eval: Pt presents with a functional oropharyngeal swallow. No oral residue or oral prep concerns with any  consistency and no pharyngeal signs/symptoms or clincial signs of aspiration. Rec continue with a regular diet with thin liquids and no further therapy indicated at this time.      Time Calculation:         Time Calculation- SLP       11/06/17 1045          Time Calculation- SLP    SLP Start Time 1012  -AD      SLP Stop Time 1045  -AD      SLP Time Calculation (min) 33 min  -AD      Total Timed Code Minutes- SLP 0 minute(s)  -AD      SLP Non-Billable Time (min) 0 min  -AD      TCU Minutes- SLP 0 min  -AD      SLP Received On 11/06/17  -AD        User Key  (r) = Recorded By, (t) = Taken By, (c) = Cosigned By    Initials Name Provider Type    AD Dahiana Villa MS CCC-SLP Speech Therapist          Therapy Charges for Today     Code Description Service Date Service Provider Modifiers Qty    [id number] HC ST EVAL ORAL PHARYNG SWALLOW 2 11/6/2017 Dahiana Villa MS CCC-SLP GN 1        SLP Discharge Summary  Anticipated Discharge Disposition: home    Dahiana Villa MS CCC-SLP  11/6/2017

## 2017-11-06 NOTE — PROGRESS NOTES
LOS: 3 days   Patient Care Team:  Chari Mercado MD as PCP - General  Chari Mercado MD as PCP - Family Medicine  Christos Rob MD as Surgeon (General Surgery)  German Wylie MD as Surgeon (Orthopedic Surgery)    Subjective     Patient reports that her cough was better through the day yesterday still had some nonproductive cough last night.  She said through the day yesterday her dizziness when she got up improved to has a little bit it's not as bad as it was.  She did desaturate last night to 85% on room air..    Review of Systems:          Objective     Vital Signs  Vital Sign Min/Max for last 24 hours  Temp  Min: 97 °F (36.1 °C)  Max: 97.8 °F (36.6 °C)   BP  Min: 100/50  Max: 178/80   Pulse  Min: 56  Max: 83   Resp  Min: 16  Max: 18   SpO2  Min: 85 %  Max: 98 %   No Data Recorded   No Data Recorded        Ventilator/Non-Invasive Ventilation Settings     None                       Body mass index is 39.07 kg/(m^2).  I/O last 3 completed shifts:  In: 2280 [P.O.:2280]  Out: 1700 [Urine:1700]  I/O this shift:  In: -   Out: 500 [Urine:500]        Physical Exam:  General Appearance: Well-developed obese white female resting comfortably in bed With oxygen saturations above 100% on 2 L nasal cannula.  Looking patient spent most of the day yesterday on room air with good saturations was only with sleep that she had desaturation.  Eyes: Conjunctiva are clear and anicteric  ENT: Mucous membranes moist no exudates  Neck: No jugular venous distention trachea midline  Lungs: Clear nonlabored symmetric expansion.  Patient did not have any coughing today with deep respiration, definitely a change from the last 2 days   Cardiac: Regular rate and rhythm no murmur  Abdomen: Soft no palpable organomegaly ,obese  : Not examined  Musc/Skel: Grossly normal  Skin: No jaundice, no rashes, no petechiae  Neuro: Alert oriented cooperative following commands grossly intact  Extremities/P Vascular: No  clubbing cyanosis trace ankle edema bilaterally palpable radial dorsalis pedis pulses  MSE: Seems in reasonably good spirits       Labs:    Results from last 7 days  Lab Units 11/04/17  1643 11/04/17  1143 11/04/17  0406 11/03/17  1235   GLUCOSE mg/dL 482* 501* 379* 106*   SODIUM mmol/L  --   --  139 142   POTASSIUM mmol/L  --   --  5.5* 4.7   CO2 mmol/L  --   --  24.9 27.9   CHLORIDE mmol/L  --   --  101 102   ANION GAP mmol/L  --   --  13.1 12.1   CREATININE mg/dL  --   --  2.19* 2.17*   BUN mg/dL  --   --  48* 42*   BUN / CREAT RATIO   --   --  21.9 19.4   CALCIUM mg/dL  --   --  9.2 9.3   EGFR IF NONAFRICN AM mL/min/1.73  --   --  22* 22*   ALK PHOS U/L  --   --  131* 130*   TOTAL PROTEIN g/dL  --   --  7.1 7.2   ALT (SGPT) U/L  --   --  7 6   AST (SGOT) U/L  --   --  10 8   BILIRUBIN mg/dL  --   --  0.4 0.4   ALBUMIN g/dL  --   --  3.90 3.70   GLOBULIN gm/dL  --   --  3.2 3.5   A/G RATIO g/dL  --   --  1.2 1.1     Estimated Creatinine Clearance: 24.1 mL/min (by C-G formula based on Cr of 2.19).        Results from last 7 days  Lab Units 11/04/17  0406 11/03/17  1235   WBC 10*3/mm3 8.32 8.26   RBC 10*6/mm3 3.29* 3.13*   HEMOGLOBIN g/dL 10.5* 10.1*   HEMATOCRIT % 32.4* 31.2*   MCV fL 98.5 99.7*   MCH pg 31.9* 32.3*   MCHC g/dL 32.4 32.4   RDW % 16.8* 17.1*   RDW-SD fl 60.7* 62.5*   MPV fL 12.1* 12.1*   PLATELETS 10*3/mm3 323 308   NEUTROPHIL % % 82.3* 55.4   LYMPHOCYTE % % 10.5* 36.3   MONOCYTES % % 0.4* 3.3   EOSINOPHIL % % 0.1 3.8   BASOPHIL % % 0.4 0.7   IMM GRAN % % 6.3* 0.5   NEUTROS ABS 10*3/mm3 6.86 4.58   LYMPHS ABS 10*3/mm3 0.87 3.00   MONOS ABS 10*3/mm3 0.03 0.27   EOS ABS 10*3/mm3 0.01* 0.31*   BASOS ABS 10*3/mm3 0.03 0.06   IMMATURE GRANS (ABS) 10*3/mm3 0.52* 0.04*   NRBC /100 WBC 0.0 0.0       Results from last 7 days  Lab Units 11/04/17  0000   PH, ARTERIAL pH units 7.408   PO2 ART mm Hg 87.7   PCO2, ARTERIAL mm Hg 37.7   HCO3 ART mmol/L 19.8*       Results from last 7 days  Lab Units 11/04/17  0407  11/04/17  0005 11/03/17  1814   TROPONIN T ng/mL <0.010 <0.010 <0.010       Results from last 7 days  Lab Units 11/04/17  0406 11/03/17  1235   PROBNP pg/mL 214.0 86.7       Results from last 7 days  Lab Units 11/03/17  1235   TSH mIU/mL 3.160           Results from last 7 days  Lab Units 11/03/17  1235   INR  1.18*     Microbiology Results (last 10 days)     Procedure Component Value - Date/Time    Respiratory Panel, PCR - Swab, Nasopharynx [[id number]]  (Normal) Collected:  11/03/17 2036    Lab Status:  Final result Specimen:  Swab from Nasopharynx Updated:  11/04/17 1810     ADENOVIRUS, PCR Not Detected     Coronavirus 229E Not Detected     Coronavirus HKU1 Not Detected     Coronavirus NL63 Not Detected     Coronavirus OC43 Not Detected     Human Metapneumovirus Not Detected     Human Rhinovirus/Enterovirus Not Detected     Influenza B PCR Not Detected     Parainfluenza Virus 1 Not Detected     Parainfluenza Virus 2 Not Detected     Parainfluenza Virus 3 Not Detected     Parainfluenza Virus 4 Not Detected     Bordetella pertussis pcr Not Detected     Influenza 2009 H1N1 by PCR Not Detected     Chlamydophila pneumoniae PCR Not Detected     Mycoplasma pneumo by PCR Not Detected     Influenza A PCR Not Detected     Influenza A H3 Not Detected     Influenza A H1 Not Detected     RSV, PCR Not Detected    Influenza Antigen, Rapid - Swab, Nasopharynx [[id number]]  (Normal) Collected:  11/03/17 1254    Lab Status:  Final result Specimen:  Swab from Nasopharynx Updated:  11/03/17 1326     Influenza A Ag, EIA Negative     Influenza B Ag, EIA Negative                apixaban 2.5 mg Oral BID   aspirin 325 mg Oral Daily   atorvastatin 10 mg Oral Nightly   calcium carb-cholecalciferol 1 tablet Oral BID With Meals   docusate sodium 100 mg Oral BID   DULoxetine 60 mg Oral QAM   furosemide 20 mg Oral QAM   gabapentin 400 mg Oral TID   insulin aspart 0-7 Units Subcutaneous 4x Daily AC & at Bedtime   insulin aspart 20 Units  Subcutaneous QAM AC   insulin aspart 25 Units Subcutaneous Daily Before Lunch   insulin aspart 25 Units Subcutaneous Daily Before Supper   ipratropium-albuterol 3 mL Nebulization 4x Daily - RT   levothyroxine 75 mcg Oral QAM   metoprolol tartrate 25 mg Oral BID   pantoprazole 40 mg Oral Q AM   potassium chloride 10 mEq Oral QAM   thiamine 100 mg Oral Daily   cyanocobalamin 1,000 mcg Oral Daily          Diagnostics:  Ct Chest Without Contrast    Result Date: 11/4/2017  CHEST CT WITHOUT CONTRAST  HISTORY: Shortness of breath with hypoxia. FINDINGS noted in the doctor's office today  TECHNIQUE: Axial images were obtained without contrast and evaluated at lung and mediastinal windows. Radiation dose reduction techniques were utilized, including automated exposure control and exposure modulation based on body size.  FINDINGS: Chest images at mediastinal window show no enlarged mediastinal or hilar lymph nodes. The aorta is mildly tortuous and ectatic. No pleural or pericardial fluid.  Images at lung window show mildly prominent interstitial markings at both lung bases consistent with either mild basilar atelectasis or fibrosis. The upper lung fields are clear. No bronchiectasis.      Mild bibasilar atelectasis versus fibrosis. No active process noted.  This report was finalized on 11/4/2017 7:25 AM by Dr. Casey Quiles MD.      Nm Lung Ventilation Perfusion    Result Date: 11/3/2017  VENTILATION/PERFUSION LUNG SCAN, 11/03/2017:  HISTORY: 77-year-old female in the ED 2 1/2 week history of worsening chronic shortness of air. D-dimer is normal.  DOSE: *  27.2 mCi technetium labeled DTPA inhaled in aerosol. *  5.9 mCi technetium labeled MAA administered intravenously.  COMPARISON: Chest x-ray today.  FINDINGS: Mildly heterogeneous distribution of inhaled tracer in both lungs on ventilation images. Perfusion images are normal. No unmatched perfusion defect is identified to suggest pulmonary embolism.      Low probability of  pulmonary embolism.  This report was finalized on 11/3/2017 4:26 PM by Dr. Jacques Bates MD.      Xr Chest 1 View    Result Date: 11/3/2017  CHEST X-RAY, 11/03/2017:  HISTORY: 77-year-old female in the ED complaining of two week history of shortness of air.  TECHNIQUE: AP portable upright chest x-ray.  FINDINGS: Low lung volumes. Mild basilar atelectasis. The lungs appear clear. Heart size and pulmonary vascularity are normal. No visible pleural effusion.      1. No active disease. 2. Low lung volumes with mild bibasilar atelectasis.  This report was finalized on 11/3/2017 1:03 PM by Dr. Jacques Bates MD.      Results for orders placed during the hospital encounter of 10/05/17   Adult Transthoracic Echo Complete W/ Cont if Necessary Per Protocol    Narrative · Left ventricular systolic function is normal. Calculated EF = 61.6%.   Estimated EF was in agreement with the calculated EF. Normal left   ventricular cavity size noted. All left ventricular wall segments contract   normally. Left ventricular wall thickness is consistent with borderline   concentric hypertrophy. Left ventricular diastolic dysfunction is noted   (grade I) consistent with impaired relaxation.  · Normal right ventricular systolic function noted. Right ventricular   cavity is borderline dilated.              Active Hospital Problems (** Indicates Principal Problem)    Diagnosis Date Noted   • Hypoxia [R09.02] 11/03/2017      Resolved Hospital Problems    Diagnosis Date Noted Date Resolved   No resolved problems to display.         Assessment/Plan     1. Acute hypoxic respiratory failure etiology is not clear arterial blood gas was done on 2 L she obviously has some hypoxia but an SPO2 of 78% does not come close to matching the PO2 with 2 L nasal cannula O2.  Yesterday's exercise oximetry she was 94% on room air and actually increased to 96% before she had to terminate due to weakness and dizziness.  I'm wondering if the pulse oximeter was  accurate if she was hypotensive it may not have been reading accurately.Regardless hypoxia seems to have resolved she is saturating well on room air just desaturated night which is almost certainly related to her untreated sleep apnea  2. Persistent/chronic cough of a number of months duration nonproductive.  No history of lung disease.  At some point I would like to get some lung functions to make sure she doesn't have cough variant asthma but just the hacking nature of her cough makes me very suspicious that it may be related to her lisinopril.  I think we should just keep her off ace inhibitors for right now and give this a little bit of time.  3. Dizziness particularly when she gets up this certainly sounds like orthostatic hypotension looks like she had a 40 mm drop in her systolic blood pressure today and that is off of lisinopril I wonder how much she was dropping previously.  This certainly may explain her symptoms . question some autonomic dysfunction with her diabetes.    's looks to have improved some with a couple days off lisinopril.  4. 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 Obstructive sleep apnea Pap intolerant, patient says that if her oxygen was dropping at night she will wear the CPAP she still has her machine at home as opposed to have him to stay on the oxygen.  And certainly this would be more beneficial to her than just supplemental oxygen.  She does not remember the name of her sleep apnea physician  7. 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 Anemia chronic disease  9. Morbid obesity    Plan for disposition: It is okay for pulmonary when other issues are resolved for her to be discharged to home.  I have instructed her to follow up if her cough is not resolved or markedly improved in the next 6-8 weeks.  Also if her cough worsens She is to follow-up with us for evaluation.  She needs to get back on her CPAP she promises me she will and she needs to follow  up with her sleep physician.  She can't remember the name begins with okay I have reviewed all of my sleep Associates names and she says it's not one of them.  If there are any problems certainly one of my sleep Associates will be glad to see her.  I have also instructed her that if she cannot tolerate her Pap machine and she does need to arrange O2 with sleep.  And she probably should have a nocturnal oximetry done on her Pap machine to ensure that she is oxygenating adequately when using it.  Mihir Acuña MD  11/06/17  6:08 AM    Time:

## 2017-11-06 NOTE — PLAN OF CARE
Problem: Patient Care Overview (Adult)  Goal: Plan of Care Review  Outcome: Ongoing (interventions implemented as appropriate)    11/06/17 8694   Coping/Psychosocial Response Interventions   Plan Of Care Reviewed With patient   Outcome Evaluation   Outcome Summary/Follow up Plan SLP/Clinical Swallow Eval: Pt presents with a functional oropharyngeal swallow. No oral residue or oral prep concerns with any consistency and no pharyngeal signs/symptoms or clincial signs of aspiration. Rec continue with a regular diet with thin liquids and no further therapy indicated at this time.

## 2017-11-06 NOTE — DISCHARGE INSTR - APPOINTMENTS
Patient has discharge follow-up on 11/27/2017 @ at 9:00 a.m.  [facility]   Doctor in [place], [place]    [address] [place] [address] [id number], [place] [address] [phone]

## 2017-11-06 NOTE — PLAN OF CARE
Problem: Respiratory Insufficiency (Adult)  Goal: Identify Related Risk Factors and Signs and Symptoms  Outcome: Ongoing (interventions implemented as appropriate)  Goal: Acid/Base Balance  Outcome: Ongoing (interventions implemented as appropriate)    11/06/17 1411   Respiratory Insufficiency (Adult)   Acid/Base Balance making progress toward outcome       Goal: Effective Ventilation  Outcome: Ongoing (interventions implemented as appropriate)    11/06/17 1411   Respiratory Insufficiency (Adult)   Effective Ventilation making progress toward outcome         Problem: Activity Intolerance (Adult)  Goal: Identify Related Risk Factors and Signs and Symptoms  Outcome: Ongoing (interventions implemented as appropriate)  Goal: Activity Tolerance  Outcome: Ongoing (interventions implemented as appropriate)    11/06/17 1411   Activity Intolerance (Adult)   Activity Tolerance making progress toward outcome       Goal: Effective Energy Conservation Techniques  Outcome: Ongoing (interventions implemented as appropriate)    11/06/17 1411   Activity Intolerance (Adult)   Effective Energy Conservation Techniques making progress toward outcome         Problem: Fall Risk (Adult)  Goal: Identify Related Risk Factors and Signs and Symptoms  Outcome: Ongoing (interventions implemented as appropriate)  Goal: Absence of Falls  Outcome: Ongoing (interventions implemented as appropriate)    11/06/17 1411   Fall Risk (Adult)   Absence of Falls making progress toward outcome

## 2017-11-06 NOTE — DISCHARGE INSTR - OTHER ORDERS
[employer]- [phone]    THEY WILL PROVIDE YOUR HOME OXYGEN- PLEASE CALL WHEN YOU ARRIVE HOME SO THEY CAN SET UP YOUR HOME OXYGEN  PLEASE CALL WITH ANY QUESTIONS OR CONCERNS

## 2017-11-06 NOTE — DISCHARGE SUMMARY
Joya Singh  1940  [id number]    Hospitalists Discharge Summary    Date of Admission: 11/3/2017  Date of Discharge:  11/6/2017    Primary Discharge Diagnoses:   1. Acute hypoxic respiratory failure with nocturnal hypoxia   2. AARON  3. Orthostatic hypotension/dizziness with h/o hypertension  4. Persistent cough  Secondary Discharge Diagnoses:   5. Grade 1 diastolic dysfunction  6. Uncontrolled DM2  7. CAD/HLD  8. 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 H/O DVT remotely/stroke  11. Anemia of chronic disease  12. Obesity  13. GERD  14. Anxiety/depression  PCP  Patient Care Team:  Chari Mercado MD as PCP - General  Chari Mercado MD as PCP - Family Medicine  Christos Rob MD as Surgeon (General Surgery)  German Wylie MD as Surgeon (Orthopedic Surgery)    Consults:   Consults     Date and Time Order Name Status Description    11/3/2017 1636 Inpatient Consult to Pulmonology          Operations and Procedures Performed:    11/04 1200 Walking Oximetry  Ct Chest Without Contrast    Result Date: 11/4/2017  Narrative: CHEST CT WITHOUT CONTRAST  HISTORY: Shortness of breath with hypoxia. FINDINGS noted in the doctor's office today  TECHNIQUE: Axial images were obtained without contrast and evaluated at lung and mediastinal windows. Radiation dose reduction techniques were utilized, including automated exposure control and exposure modulation based on body size.  FINDINGS: Chest images at mediastinal window show no enlarged mediastinal or hilar lymph nodes. The aorta is mildly tortuous and ectatic. No pleural or pericardial fluid.  Images at lung window show mildly prominent interstitial markings at both lung bases consistent with either mild basilar atelectasis or fibrosis. The upper lung fields are clear. No bronchiectasis.      Impression: Mild bibasilar atelectasis versus fibrosis. No active process noted.  This report was finalized on 11/4/2017 7:25 AM by Dr. Casey Quiles MD.      Nm Lung Ventilation  Perfusion    Result Date: 11/3/2017  Narrative: VENTILATION/PERFUSION LUNG SCAN, 11/03/2017:  HISTORY: 77-year-old female in the ED 2 1/2 week history of worsening chronic shortness of air. D-dimer is normal.  DOSE: *  27.2 mCi technetium labeled DTPA inhaled in aerosol. *  5.9 mCi technetium labeled MAA administered intravenously.  COMPARISON: Chest x-ray today.  FINDINGS: Mildly heterogeneous distribution of inhaled tracer in both lungs on ventilation images. Perfusion images are normal. No unmatched perfusion defect is identified to suggest pulmonary embolism.      Impression: Low probability of pulmonary embolism.  This report was finalized on 11/3/2017 4:26 PM by Dr. Jacques Bates MD.      Xr Chest 1 View    Result Date: 11/3/2017  Narrative: CHEST X-RAY, 11/03/2017:  HISTORY: 77-year-old female in the ED complaining of two week history of shortness of air.  TECHNIQUE: AP portable upright chest x-ray.  FINDINGS: Low lung volumes. Mild basilar atelectasis. The lungs appear clear. Heart size and pulmonary vascularity are normal. No visible pleural effusion.      Impression: 1. No active disease. 2. Low lung volumes with mild bibasilar atelectasis.  This report was finalized on 11/3/2017 1:03 PM by Dr. Jacques Bates MD.      Allergies:  is allergic to morphine and related; baclofen; codeine; morphine; and penicillins.    Chip  Hydrocodone 10/2017 per report of 11/3/17    Discharge Medications:   Joya Singh   Home Medication Instructions GISSELLE:[id number]    Printed on:11/06/17 1250   Medication Information                      ACCU-CHEK FASTCLIX LANCETS misc  TEST 3-4 TIMES DAILY AS DIRECTED             acetaminophen (TYLENOL) 325 MG tablet  Take 2 tablets by mouth Every 6 (Six) Hours As Needed for Mild Pain . OTC product             Alcohol Swabs (B-D SINGLE USE SWABS REGULAR) pads               apixaban (ELIQUIS) 2.5 MG tablet tablet  Take 1 tablet by mouth 2 (Two) Times a Day.             aspirin 325  "MG tablet  Take 325 mg by mouth Daily.             atorvastatin (LIPITOR) 10 MG tablet  Take 10 mg by mouth Every Night.             Blood Glucose Monitoring Suppl (ACCU-CHEK KENNETH SMARTVIEW) W/DEVICE kit  USE AS DIRECTED             buPROPion SR (WELLBUTRIN SR) 150 MG 12 hr tablet  Take 150 mg by mouth Every Night.             Calcium Carb-Cholecalciferol (CALCIUM PLUS VITAMIN D3 PO)  Take 1 tablet by mouth Every Morning.             Cyanocobalamin (VITAMELTS ENERGY VITAMIN B-12) 1500 MCG tablet dispersible  Take 1 tablet by mouth Every Morning.             cyclobenzaprine (FLEXERIL) 10 MG tablet  Take 10 mg by mouth 3 (Three) Times a Day As Needed for Muscle Spasms.             DULoxetine (CYMBALTA) 60 MG capsule  Take 60 mg by mouth Every Morning.             furosemide (LASIX) 20 MG tablet  Take 20 mg by mouth Every Morning.             gabapentin (NEURONTIN) 400 MG capsule  Take 400 mg by mouth 3 (Three) Times a Day.             HYDROcodone-acetaminophen (NORCO) 5-325 MG per tablet  Take 1 tablet by mouth Every 12 (Twelve) Hours As Needed for Moderate Pain .             insulin aspart (NOVOLOG FLEXPEN) 100 UNIT/ML solution pen-injector sc pen  20 units sq before breakfast then 25 units sq before lunch and dinner             Insulin Pen Needle (ULTICARE MINI PEN NEEDLES) 31G X 6 MM misc  To check blood sugar once daily             levothyroxine (SYNTHROID, LEVOTHROID) 75 MCG tablet  Take 75 mcg by mouth Every Morning.             melatonin 5 MG tablet tablet  Take 5 mg by mouth At Night As Needed.             metoprolol tartrate (LOPRESSOR) 12.5 MG half tablet  Take 12.5 mg by mouth 2 (Two) Times a Day.             Needle, Disp, (BD DISP NEEDLES) 30G X 1/2\" misc  To be used 3 times daily with Novolog Flexpen.             omeprazole (priLOSEC) 40 MG capsule  Take 40 mg by mouth Every Morning.             potassium chloride (K-DUR) 10 MEQ CR tablet  Take 10 mEq by mouth Every Morning.             thiamine (VITAMIN " B-1) 100 MG tablet  Take 100 mg by mouth Daily.             TRESIBA FLEXTOUCH 200 UNIT/ML solution pen-injector  Inject 80 Units under the skin Every Night.             ULTICARE MICRO PEN NEEDLES 32G X 4 MM misc               ULTICARE PEN NEEDLES 29G X 12MM misc                 History of Present Illness:  Patient is a 77 y.o. female.  Asked to see regarding hypoxia.  Patient apparently went to see her primary care physician Dr. Mercado yesterday and apparently had a number of complaints but apparently had an oxygen saturation of 78% on room air.  Patient reports she's had a cough mostly nonproductive for at least 3 months or more now.  She has gotten extremely dizzy particularly when she gets up feeling like she's going to pass out.  With this some time she has some nausea and sometimes some chest pain.  Apparently she had some of these complaints recently last month and had an echocardiogram and stress test that didn't show any evidence of ischemia and she had a normal LV systolic function grade 1 diastolic dysfunction normal RV systolic function and she did not have significant enough tricuspid regurgitation to measure RV pressures.  Patient apparently was complaining of about 10 days of shortness of breath when she was at Dr. Mercado's office although her current complaint is more the cough and dizziness and extreme weakness when she gets up.  Patient reports she has had nausea and a couple of episodes of emesis with this dizziness over the last few weeks.  No lower extremity pain or swelling  history of blood clots left leg a number of years ago..  She is a diabetic and reports she does have bad reflux disease she's never been diagnosed with any lung disease she's never smoker no history of asthma.  She's not really had any fevers chills or sweats she does have obstructive sleep apnea but does not tolerate Pap machine and does not use it regularly although she still has a machine at home    Hospital Course  1. Acute  hypoxic respiratory failure with nocturnal hypoxia:   2. ARAON: Pulmonary followed  Respiratory viral panel negative  Reportedly intolerant of pap  Resolved awake, needs 2 liters nasal cannula with sleep which has been arranged along with home health  Per Dr. Acuña the patient is to resume CPAP use at home if able and plan follow-up with her sleep apnea provider.   If she is unable to locate of her previous provider then [facility] will see her  Duonebs 4 times a day given while here, no need to continue at home  F/U patient's pulmonologist 1-2 weeks  F/U Chari Mercado MD 1 week    3. Orthostatic hypotension/dizziness with h/o hypertension:  Home dose metoprolol 12.5 mg twice daily, during admit, increased to 25 mg twice daily with lisinopril discontinued  +orthostatic this am which resolved with bolus   500 ml normal saline bolus given with repeat orthostatics negative following bolus  Review of home meds shows she was taking 12.5 mg tablet twice daily  F/U Chari Mercado MD with BP log  HH to follow as well    4. Persistent cough: resolving off lisinopril  F/U Chari Mercado MD 1 week    5. Grade 1 diastolic dysfunction: no acute issues on Lasix 20 mg daily  Echo showed normal EF  F/U as scheduled with Dr. Sanon 11/7/17, appointment made prior to this admit    6. Uncontrolled DM2: Last A1C 8.6% on 9/19/17  Glucose severely elevated initially, now much improved control but am remains over 200  Continued Novolog 20 units every morning/Novolog 25 units before lunch/supper  SSI low dose while here  Resume home tresiba 80 units nightly  Continue CCC diet  Continue to monitor accuchecks ac/hs, log and bring to Chari Mercado MD    7. CAD/HLD: atorvastatin 10 mg nightly/ mg daily    8. 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 H/O DVT  remotely/stroke: eliquis 2.5 mg twice daily, chronic continued here    11. Anemia of chronic disease: Hemoglobin stable at 10.5 last check 11/4/17  F/U Chari Mercado MD as above    12. Obesity: Body mass index is 39.07 kg/(m^2).   Nutrition followed, continue CCC diet    13. GERD: continued on protonix throughout, modified swallow study and bedside swallow per SLP showed no acute findings.     14. Anxiety/depression: no acute issues on home cymbalta 60 mg daily/wellbutrin  mg nightly     Last Lab Results:   Lab Results (most recent)     Procedure Component Value Units Date/Time    CBC & Differential [[id number]] Collected:  11/03/17 1235    Specimen:  Blood Updated:  11/03/17 1253    Narrative:       The following orders were created for panel order CBC & Differential.  Procedure                               Abnormality         Status                     ---------                               -----------         ------                     CBC Auto Differential[[id number]]        Abnormal            Final result                 Please view results for these tests on the individual orders.    CBC Auto Differential [[id number]]  (Abnormal) Collected:  11/03/17 1235    Specimen:  Blood from Arm, Right Updated:  11/03/17 1253     WBC 8.26 10*3/mm3      RBC 3.13 (L) 10*6/mm3      Hemoglobin 10.1 (L) g/dL      Hematocrit 31.2 (L) %      MCV 99.7 (H) fL      MCH 32.3 (H) pg      MCHC 32.4 g/dL      RDW 17.1 (H) %      RDW-SD 62.5 (H) fl      MPV 12.1 (H) fL      Platelets 308 10*3/mm3      Neutrophil % 55.4 %      Lymphocyte % 36.3 %      Monocyte % 3.3 %      Eosinophil % 3.8 %      Basophil % 0.7 %      Immature Grans % 0.5 %      Neutrophils, Absolute 4.58 10*3/mm3      Lymphocytes, Absolute 3.00 10*3/mm3      Monocytes, Absolute 0.27 10*3/mm3      Eosinophils, Absolute 0.31 (H) 10*3/mm3      Basophils, Absolute 0.06 10*3/mm3      Immature Grans, Absolute 0.04 (H) 10*3/mm3      nRBC 0.0 /100 WBC      Comprehensive Metabolic Panel [[id number]]  (Abnormal) Collected:  11/03/17 1235    Specimen:  Blood from Arm, Right Updated:  11/03/17 1312     Glucose 106 (H) mg/dL      BUN 42 (H) mg/dL      Creatinine 2.17 (H) mg/dL      Sodium 142 mmol/L      Potassium 4.7 mmol/L      Chloride 102 mmol/L      CO2 27.9 mmol/L      Calcium 9.3 mg/dL      Total Protein 7.2 g/dL      Albumin 3.70 g/dL      ALT (SGPT) 6 U/L      AST (SGOT) 8 U/L      Alkaline Phosphatase 130 (H) U/L      Total Bilirubin 0.4 mg/dL      eGFR Non African Amer 22 (L) mL/min/1.73      Globulin 3.5 gm/dL      A/G Ratio 1.1 g/dL      BUN/Creatinine Ratio 19.4     Anion Gap 12.1 mmol/L     Narrative:       The MDRD GFR formula is only valid for adults with stable renal function between ages 18 and 70.    TSH [[id number]]  (Normal) Collected:  11/03/17 1235    Specimen:  Blood from Arm, Right Updated:  11/03/17 1322     TSH 3.160 mIU/mL     Troponin [[id number]]  (Normal) Collected:  11/03/17 1235    Specimen:  Blood from Arm, Right Updated:  11/03/17 1322     Troponin T <0.010 ng/mL     Narrative:       Troponin T Reference Ranges:  Less than 0.03 ng/mL:    Negative for AMI  0.03 to 0.09 ng/mL:      Indeterminant for AMI  Greater than 0.09 ng/mL: Positive for AMI    Influenza Antigen, Rapid - Swab, Nasopharynx [[id number]]  (Normal) Collected:  11/03/17 1254    Specimen:  Swab from Nasopharynx Updated:  11/03/17 1326     Influenza A Ag, EIA Negative     Influenza B Ag, EIA Negative    D-dimer, Quantitative [[id number]]  (Normal) Collected:  11/03/17 1235    Specimen:  Blood from Arm, Right Updated:  11/03/17 1327     D-Dimer, Quantitative 0.22 MCGFEU/mL     Narrative:       Can be elevated in, but is not diagnostic for deep vein thrombosis (DVT) or pulmonary embolis (PE).  It is also elevated in other medical conditions.  Clinical correlation is required.  The negative cut-off value for the D-Dimer is 0.50 mcg FEU/mL for DVT and PE.    Urinalysis With / Culture  If Indicated - Urine, Clean Catch [[id number]]  (Normal) Collected:  11/03/17 1303    Specimen:  Urine from Urine, Catheter Updated:  11/03/17 1330     Color, UA Yellow     Appearance, UA Clear     pH, UA 5.5     Specific Gravity, UA 1.015     Glucose, UA Negative     Ketones, UA Negative     Bilirubin, UA Negative     Blood, UA Negative     Protein, UA Negative     Leuk Esterase, UA Negative     Nitrite, UA Negative     Urobilinogen, UA 0.2 E.U./dL    Narrative:       Urine microscopic not indicated.    Protime-INR [[id number]]  (Abnormal) Collected:  11/03/17 1235    Specimen:  Blood from Arm, Right Updated:  11/03/17 1338     Protime 15.1 (H) Seconds      INR 1.18 (H)    Narrative:       Therapeutic Ranges for INR: 2.0-3.0 (PT 20-30)                              2.5-3.5 (PT 25-34)    aPTT [[id number]]  (Normal) Collected:  11/03/17 1235    Specimen:  Blood from Arm, Right Updated:  11/03/17 1338     PTT 33.6 seconds     Narrative:       PTT = The equivalent PTT values for the therapeutic range of heparin levels at 0.1 to 0.7 U/ml are 53 to 110 seconds.    BNP [[id number]]  (Normal) Collected:  11/03/17 1235    Specimen:  Blood from Arm, Right Updated:  11/03/17 1440     proBNP 86.7 pg/mL     Narrative:       Among patients with dyspnea, NT-proBNP is highly sensitive for the detection of acute congestive heart failure. In addition NT-proBNP of <300 pg/ml effectively rules out acute congestive heart failure with 99% negative predictive value.    POC Glucose Fingerstick [[id number]]  (Abnormal) Collected:  11/03/17 1744    Specimen:  Blood Updated:  11/03/17 1750     Glucose 65 (L) mg/dL     Narrative:       Meter: [id number] : [id number] Kira Oteor RN    Troponin [[id number]]  (Normal) Collected:  11/03/17 1814    Specimen:  Blood Updated:  11/03/17 1838     Troponin T <0.010 ng/mL     Narrative:       Troponin T Reference Ranges:  Less than 0.03 ng/mL:    Negative for AMI  0.03 to 0.09 ng/mL:       Indeterminant for AMI  Greater than 0.09 ng/mL: Positive for AMI    POC Glucose Fingerstick [[id number]]  (Abnormal) Collected:  11/03/17 2055    Specimen:  Blood Updated:  11/03/17 2101     Glucose 227 (H) mg/dL     Narrative:       Meter: [id number] : [id number] Aureliano Easley NURSING ASSISTANT    Troponin [[id number]]  (Normal) Collected:  11/04/17 0005    Specimen:  Blood Updated:  11/04/17 0048     Troponin T <0.010 ng/mL     Narrative:       Troponin T Reference Ranges:  Less than 0.03 ng/mL:    Negative for AMI  0.03 to 0.09 ng/mL:      Indeterminant for AMI  Greater than 0.09 ng/mL: Positive for AMI    CBC Auto Differential [[id number]]  (Abnormal) Collected:  11/04/17 0406    Specimen:  Blood Updated:  11/04/17 0415     WBC 8.32 10*3/mm3      RBC 3.29 (L) 10*6/mm3      Hemoglobin 10.5 (L) g/dL      Hematocrit 32.4 (L) %      MCV 98.5 fL      MCH 31.9 (H) pg      MCHC 32.4 g/dL      RDW 16.8 (H) %      RDW-SD 60.7 (H) fl      MPV 12.1 (H) fL      Platelets 323 10*3/mm3      Neutrophil % 82.3 (H) %      Lymphocyte % 10.5 (L) %      Monocyte % 0.4 (L) %      Eosinophil % 0.1 %      Basophil % 0.4 %      Immature Grans % 6.3 (H) %      Neutrophils, Absolute 6.86 10*3/mm3      Lymphocytes, Absolute 0.87 10*3/mm3      Monocytes, Absolute 0.03 10*3/mm3      Eosinophils, Absolute 0.01 (L) 10*3/mm3      Basophils, Absolute 0.03 10*3/mm3      Immature Grans, Absolute 0.52 (H) 10*3/mm3      nRBC 0.0 /100 WBC     Scan Slide [[id number]]  (Normal) Collected:  11/04/17 0406    Specimen:  Blood Updated:  11/04/17 0415     RBC Morphology Normal     WBC Morphology Normal     Platelet Morphology Normal    BNP [[id number]]  (Normal) Collected:  11/04/17 0406    Specimen:  Blood Updated:  11/04/17 0439     proBNP 214.0 pg/mL     Narrative:       Among patients with dyspnea, NT-proBNP is highly sensitive for the detection of acute congestive heart failure. In addition NT-proBNP of <300 pg/ml effectively rules out acute  congestive heart failure with 99% negative predictive value.    Comprehensive Metabolic Panel [[id number]]  (Abnormal) Collected:  11/04/17 0406    Specimen:  Blood Updated:  11/04/17 0452     Glucose 379 (H) mg/dL      BUN 48 (H) mg/dL      Creatinine 2.19 (H) mg/dL      Sodium 139 mmol/L      Potassium 5.5 (H) mmol/L      Chloride 101 mmol/L      CO2 24.9 mmol/L      Calcium 9.2 mg/dL      Total Protein 7.1 g/dL      Albumin 3.90 g/dL      ALT (SGPT) 7 U/L      AST (SGOT) 10 U/L      Alkaline Phosphatase 131 (H) U/L      Total Bilirubin 0.4 mg/dL      eGFR Non African Amer 22 (L) mL/min/1.73      Globulin 3.2 gm/dL      A/G Ratio 1.2 g/dL      BUN/Creatinine Ratio 21.9     Anion Gap 13.1 mmol/L     Narrative:       The MDRD GFR formula is only valid for adults with stable renal function between ages 18 and 70.    Blood Gas, Arterial [[id number]]  (Abnormal) Collected:  11/04/17 0000    Specimen:  Arterial Blood Updated:  11/04/17 0557     Site Left Radial     Lakhwinder's Test Positive     pH, Arterial 7.408 pH units      pCO2, Arterial 37.7 mm Hg      pO2, Arterial 87.7 mm Hg      HCO3, Arterial 19.8 (L) mmol/L      Base Excess, Arterial 3.1 (H) mmol/L      O2 Saturation, Arterial 97.0 %      Temperature 37.0 C      Barometric Pressure for Blood Gas 742 mmHg      Modality Nasal Cannula     Flow Rate 2.0 lpm      Ventilator Mode NA    POC Glucose Fingerstick [[id number]]  (Abnormal) Collected:  11/04/17 0733    Specimen:  Blood Updated:  11/04/17 0745     Glucose 324 (H) mg/dL     Narrative:       Meter: [id number] : [id number] Richard Streeter NURSING ASSISTANT    Troponin [[id number]]  (Normal) Collected:  11/04/17 0407    Specimen:  Blood Updated:  11/04/17 0747     Troponin T <0.010 ng/mL     Narrative:       Troponin T Reference Ranges:  Less than 0.03 ng/mL:    Negative for AMI  0.03 to 0.09 ng/mL:      Indeterminant for AMI  Greater than 0.09 ng/mL: Positive for AMI    POC Glucose Fingerstick [[id number]]   (Abnormal) Collected:  11/04/17 1123    Specimen:  Blood Updated:  11/04/17 1132     Glucose 476 (C) mg/dL     Narrative:       RN Notified R and V Meter: [id number] : [id number] Richard TRAMMELL ASS    Glucose, Random [[id number]]  (Abnormal) Collected:  11/04/17 1143    Specimen:  Blood Updated:  11/04/17 1230     Glucose 501 (C) mg/dL     POC Glucose Fingerstick [[id number]]  (Abnormal) Collected:  11/04/17 1622    Specimen:  Blood Updated:  11/04/17 1628     Glucose 447 (H) mg/dL     Narrative:       Critical repeat  Meter: [id number] : [id number] Ynes TRAMMELL ASSISTAN    Glucose, Random [[id number]]  (Abnormal) Collected:  11/04/17 1643    Specimen:  Blood Updated:  11/04/17 1715     Glucose 482 (C) mg/dL     Respiratory Panel, PCR - Swab, Nasopharynx [[id number]]  (Normal) Collected:  11/03/17 2036    Specimen:  Swab from Nasopharynx Updated:  11/04/17 1810     ADENOVIRUS, PCR Not Detected     Coronavirus 229E Not Detected     Coronavirus HKU1 Not Detected     Coronavirus NL63 Not Detected     Coronavirus OC43 Not Detected     Human Metapneumovirus Not Detected     Human Rhinovirus/Enterovirus Not Detected     Influenza B PCR Not Detected     Parainfluenza Virus 1 Not Detected     Parainfluenza Virus 2 Not Detected     Parainfluenza Virus 3 Not Detected     Parainfluenza Virus 4 Not Detected     Bordetella pertussis pcr Not Detected     Influenza 2009 H1N1 by PCR Not Detected     Chlamydophila pneumoniae PCR Not Detected     Mycoplasma pneumo by PCR Not Detected     Influenza A PCR Not Detected     Influenza A H3 Not Detected     Influenza A H1 Not Detected     RSV, PCR Not Detected    POC Glucose Fingerstick [[id number]]  (Abnormal) Collected:  11/04/17 2009    Specimen:  Blood Updated:  11/04/17 2016     Glucose 327 (H) mg/dL     Narrative:       NOTIFIED RN Meter: [id number] : [id number] Best DE LUNA    POC Glucose Fingerstick [[id number]]  (Abnormal) Collected:  11/05/17 0807     Specimen:  Blood Updated:  11/05/17 0817     Glucose 284 (H) mg/dL     Narrative:       Meter: [id number] : [id number] [employer] CERT    POC Glucose Fingerstick [[id number]]  (Abnormal) Collected:  11/05/17 1119    Specimen:  Blood Updated:  11/05/17 1145     Glucose 143 (H) mg/dL     Narrative:       Meter: [id number] : [id number] [employer] CERT    POC Glucose Fingerstick [[id number]]  (Abnormal) Collected:  11/05/17 1642    Specimen:  Blood Updated:  11/05/17 1651     Glucose 263 (H) mg/dL     Narrative:       Meter: [id number] : [id number] [employer] CERT    POC Glucose Fingerstick [[id number]]  (Abnormal) Collected:  11/05/17 2024    Specimen:  Blood Updated:  11/05/17 2031     Glucose 272 (H) mg/dL     Narrative:       Meter: [id number] : [id number] Best Garcia [facility]    POC Glucose Fingerstick [[id number]]  (Abnormal) Collected:  11/06/17 0722    Specimen:  Blood Updated:  11/06/17 0732     Glucose 221 (H) mg/dL     Narrative:       Meter: [id number] : [id number] Aureliant        Imaging Results (most recent)     Procedure Component Value Units Date/Time    XR Chest 1 View [[id number]] Collected:  11/03/17 1303     Updated:  11/03/17 1321    Narrative:       CHEST X-RAY, 11/03/2017:     HISTORY:   77-year-old female in the ED complaining of two week history of  shortness of air.     TECHNIQUE:  AP portable upright chest x-ray.     FINDINGS:  Low lung volumes. Mild basilar atelectasis. The lungs appear clear.  Heart size and pulmonary vascularity are normal. No visible pleural  effusion.       Impression:       1. No active disease.  2. Low lung volumes with mild bibasilar atelectasis.     This report was finalized on 11/3/2017 1:03 PM by Dr. Jacques Bates MD.       NM Lung Ventilation Perfusion [[id number]] Collected:  11/03/17 1623     Updated:  11/03/17 1628    Narrative:       VENTILATION/PERFUSION LUNG SCAN, 11/03/2017:     HISTORY:   77-year-old female in  the ED 2 1/2 week history of worsening chronic  shortness of air. D-dimer is normal.     DOSE:   *  27.2 mCi technetium labeled DTPA inhaled in aerosol.  *  5.9 mCi technetium labeled MAA administered intravenously.     COMPARISON:   Chest x-ray today.     FINDINGS:   Mildly heterogeneous distribution of inhaled tracer in both lungs on  ventilation images. Perfusion images are normal. No unmatched perfusion  defect is identified to suggest pulmonary embolism.       Impression:       Low probability of pulmonary embolism.     This report was finalized on 11/3/2017 4:26 PM by Dr. Jacques Bates MD.       CT Chest Without Contrast [[id number]] Collected:  11/04/17 0724     Updated:  11/04/17 0727    Narrative:       CHEST CT WITHOUT CONTRAST     HISTORY:  Shortness of breath with hypoxia. FINDINGS noted in the doctor's office  today     TECHNIQUE:  Axial images were obtained without contrast and evaluated at lung and  mediastinal windows. Radiation dose reduction techniques were utilized,  including automated exposure control and exposure modulation based on  body size.     FINDINGS:  Chest images at mediastinal window show no enlarged mediastinal or hilar  lymph nodes. The aorta is mildly tortuous and ectatic. No pleural or  pericardial fluid.     Images at lung window show mildly prominent interstitial markings at  both lung bases consistent with either mild basilar atelectasis or  fibrosis. The upper lung fields are clear. No bronchiectasis.       Impression:       Mild bibasilar atelectasis versus fibrosis. No active  process noted.     This report was finalized on 11/4/2017 7:25 AM by Dr. Casey Quiles MD.           PROCEDURES: NONE    Condition on Discharge:  Stable    Physical Exam at Discharge  Vital Signs  Temp:  [97 °F (36.1 °C)-97.9 °F (36.6 °C)] 97.9 °F (36.6 °C)  Heart Rate:  [] 68  Resp:  [16-20] 20  BP: ()/(50-71) 113/65    Physical Exam:  Physical Exam   Constitutional: Patient appears  well-developed and well-nourished and in no acute distress, obese   HEENT:   Head: Normocephalic and atraumatic.   Eyes:  Pupils are equal, round, and reactive to light. EOM are intact. Sclera are anicteric and non-injected.  Mouth and Throat: Patient has moist mucous membranes. Oropharynx is clear of any erythema or exudate.     Neck: Neck supple. No JVD present. No thyromegaly present. No lymphadenopathy present.  Cardiovascular: Regular rate, regular rhythm, S1 normal and S2 normal.  Exam reveals no gallop and no friction rub.  No murmur heard.  Pulmonary/Chest: Lungs are clear to auscultation bilaterally. No respiratory distress. No wheezes. No rhonchi. No rales.   Abdominal: Obese, Soft. Bowel sounds are normal. No distension and no mass. There is no hepatosplenomegaly. There is no tenderness.   Musculoskeletal: Normal Muscle tone  Extremities: No edema. Pulses are palpable in all 4 extremities.  Neurological: Patient is alert and oriented to person, place, and time. Cranial nerves II-XII are grossly intact with no focal deficits.  Skin: Skin is warm. No rash noted. Nails show no clubbing.  No cyanosis or erythema.    Discharge Disposition  Home    Visiting Nurse:    Yes     Home PT/OT:  Yes     Home Safety Evaluation:  Yes     DME  Oxygen arranged    Discharge Diet:         Dietary Orders            Start     Ordered    11/03/17 1626  Diet Regular; Cardiac, Consistent Carbohydrate  Diet Effective Now     Question Answer Comment   Diet Texture / Consistency Regular    Common Modifiers Cardiac    Common Modifiers Consistent Carbohydrate        11/03/17 1627        Activity at Discharge:  As tolerated    Pre-discharge education  Diabetic, Cardiac, medications, follow up    Follow-up Appointments  Future Appointments  Date Time Provider Department Center   11/7/2017 1:00 PM Kehinde Sanon MD [facility] None   11/21/2017 10:45 AM REY De Los Santos [facility] None     Additional Instructions for the Follow-ups  that You Need to Schedule     Discharge Follow-Up With Specified Provider    As directed    To:  Pulmonary   Follow Up:  2 Weeks       Discharge Follow-up with PCP    As directed    Follow Up Details:  1 week               Test Results Pending at Discharge: NONE     Alison Olivera, APRN  11/06/17  12:50 PM    Time: Discharge over 30 min (if over 30 minutes give explanation as to why it took greater than 30 minutes)  Secondary to:  Fluid bolus/orthostatics/oxygen arrangements  Review of overnight oximetry  Coordination of care/follow up/HH/oxygen  Medication reconciliation  D/W patient

## 2017-11-07 ENCOUNTER — OFFICE VISIT (OUTPATIENT)
Dept: CARDIOLOGY | Facility: CLINIC | Age: 77
End: 2017-11-07

## 2017-11-07 VITALS
HEART RATE: 85 BPM | HEIGHT: 62 IN | WEIGHT: 213 LBS | SYSTOLIC BLOOD PRESSURE: 106 MMHG | DIASTOLIC BLOOD PRESSURE: 62 MMHG | BODY MASS INDEX: 39.2 KG/M2

## 2017-11-07 DIAGNOSIS — I10 ESSENTIAL HYPERTENSION: ICD-10-CM

## 2017-11-07 DIAGNOSIS — Z01.810 PREOPERATIVE CARDIOVASCULAR EXAMINATION: ICD-10-CM

## 2017-11-07 DIAGNOSIS — R94.39 ABNORMAL CARDIOVASCULAR STRESS TEST: Primary | ICD-10-CM

## 2017-11-07 DIAGNOSIS — R07.89 ATYPICAL CHEST PAIN: ICD-10-CM

## 2017-11-07 PROBLEM — Z79.01 SUBTHERAPEUTIC ANTICOAGULATION: Status: RESOLVED | Noted: 2017-02-28 | Resolved: 2017-11-07

## 2017-11-07 PROBLEM — Z51.81 SUBTHERAPEUTIC ANTICOAGULATION: Status: RESOLVED | Noted: 2017-02-28 | Resolved: 2017-11-07

## 2017-11-07 PROBLEM — M79.605 LEFT LEG PAIN: Status: RESOLVED | Noted: 2017-02-28 | Resolved: 2017-11-07

## 2017-11-07 PROBLEM — R09.02 HYPOXIA: Status: RESOLVED | Noted: 2017-11-03 | Resolved: 2017-11-07

## 2017-11-07 PROCEDURE — 99214 OFFICE O/P EST MOD 30 MIN: CPT | Performed by: INTERNAL MEDICINE

## 2017-11-07 NOTE — NURSING NOTE
Case Management Discharge Note    Final Note: dc home with [facility] to follow    Discharge Placement     Facility/Agency Request Status Selected? Address Phone Number Fax Number    Psychiatric Accepted    Yes [address] [place] [address], [facility] [id number] [phone]             Discharge Codes: 06  Discharged/transferred to home under care of organized home health service organization in anticipation of skilled care

## 2017-11-08 NOTE — PROGRESS NOTES
Date of Office Visit: 2017  Encounter Provider: Kehinde Sanon MD  Place of Service: [facility] CARDIOLOGY  Patient Name: Joya Singh  :1940    Chief Complaint   Patient presents with   • Coronary Artery Disease   • Hypotension   • Pre-op Exam   :     HPI: Joya Singh is a 77 y.o. female who presents today for preoperative assessment prior to knee arthroscopy.  There is a reported history of CAD, but a cardiac cath was performed at [facility] in , and showed no significant intraluminal coronary atherosclerosis.  There was extraluminal coronary artery calcification.  She has diabetes, a history of stroke, and hypertension. She states she has recurrent TIAs, but it would seem that she has Bell's palsy.    She was recently hospitalized for an acute exacerbation of COPD.  She seemingly did not have acute cardiac issues during that stay; she did have some orthostasis, but this is not a new diagnosis for her at all.    She had chest pain (one episode) two months ago which lasted a few minutes and felt like a pain in the upper chest.  It did not radiate, and was not associated with dyspnea, palpitations, nausea, diaphoresis, or syncope.  It has not returned.  As mentioned above, she has chronic orthostasis but this is stable; she hasn't passed out.      She had an echo and stress performed recently. The echo showed and LVEF of 62% and was otherwise within normal limits for age.  The stress test showed a very small, extremely mild reversible defect in the distal anterior wall.      She has poor exercise tolerance due to her arthritis and obesity.    Past Medical History:   Diagnosis Date   • Allergic rhinitis    • Anxiety    • Arthritis    • Bell's palsy    • CKD (chronic kidney disease) stage 3, GFR 30-59 ml/min    • Depression    • Diabetes mellitus     LAST A1C 6   • Diabetic gastroparesis 2016   • GERD (gastroesophageal reflux disease)    • H/O blood clots      LEFT LEG 7 OR 8 YEARS AGO   • Hyperlipidemia    • Hypertension    • Hypothyroidism    • Normal coronary arteries     by cath 2013   • AARON (obstructive sleep apnea)     DOESNT WEAR REGULARLY   • PONV (postoperative nausea and vomiting)    • Skin cancer    • Stroke    • TIA (transient ischemic attack)     LAST TIA JULY 2017       Past Surgical History:   Procedure Laterality Date   • BACK SURGERY      HARDWARE   • CHOLECYSTECTOMY      OPEN   • COLONOSCOPY  2011    due for repeat in 2021   • HYSTERECTOMY      PARTIAL    • NECK SURGERY     • SPINE SURGERY     • UPPER GASTROINTESTINAL ENDOSCOPY  2014    gastritis.  done by dr. hastings       Social History     Social History   • Marital status:      Spouse name: N/A   • Number of children: N/A   • Years of education: N/A     Occupational History   • Not on file.     Social History Main Topics   • Smoking status: Never Smoker   • Smokeless tobacco: Never Used      Comment: CAFFEINE USE: NONE   • Alcohol use No   • Drug use: No   • Sexual activity: Defer      Comment: EXERCISE - RARELY     Other Topics Concern   • Not on file     Social History Narrative       Family History   Problem Relation Age of Onset   • Lupus Mother    • Heart failure Mother 59   • Heart disease Other    • Hypertension Other    • Heart attack Father        Review of Systems   Respiratory: Positive for cough.    Musculoskeletal: Positive for joint pain.   Gastrointestinal: Positive for nausea.   Neurological: Positive for light-headedness and paresthesias.   All other systems reviewed and are negative.      Allergies   Allergen Reactions   • Morphine And Related    • Baclofen Anxiety     Panic attack, nightmares   • Codeine Itching and Rash   • Morphine Rash   • Penicillins Rash         Current Outpatient Prescriptions:   •  ACCU-CHEK FASTCLIX LANCETS misc, TEST 3-4 TIMES DAILY AS DIRECTED, Disp: 120 each, Rfl: 10  •  acetaminophen (TYLENOL) 325 MG tablet, Take 2 tablets by mouth Every 6  (Six) Hours As Needed for Mild Pain . OTC product, Disp: 40 tablet, Rfl: 0  •  Alcohol Swabs (B-D SINGLE USE SWABS REGULAR) pads, , Disp: , Rfl:   •  apixaban (ELIQUIS) 2.5 MG tablet tablet, Take 1 tablet by mouth 2 (Two) Times a Day., Disp: 60 tablet, Rfl: 2  •  aspirin 325 MG tablet, Take 325 mg by mouth Daily., Disp: , Rfl:   •  atorvastatin (LIPITOR) 10 MG tablet, Take 10 mg by mouth Every Night., Disp: , Rfl:   •  Blood Glucose Monitoring Suppl (ACCU-CHEK KENNETH SMARTVIEW) W/DEVICE kit, USE AS DIRECTED, Disp: , Rfl:   •  buPROPion SR (WELLBUTRIN SR) 150 MG 12 hr tablet, Take 150 mg by mouth Every Night., Disp: , Rfl:   •  Calcium Carb-Cholecalciferol (CALCIUM PLUS VITAMIN D3 PO), Take 1 tablet by mouth Every Morning., Disp: , Rfl:   •  Cyanocobalamin (VITAMELTS ENERGY VITAMIN B-12) 1500 MCG tablet dispersible, Take 1 tablet by mouth Every Morning., Disp: , Rfl:   •  cyclobenzaprine (FLEXERIL) 10 MG tablet, Take 10 mg by mouth 3 (Three) Times a Day As Needed for Muscle Spasms., Disp: , Rfl:   •  DULoxetine (CYMBALTA) 60 MG capsule, Take 60 mg by mouth Every Morning., Disp: , Rfl:   •  furosemide (LASIX) 20 MG tablet, Take 20 mg by mouth Every Morning., Disp: , Rfl:   •  gabapentin (NEURONTIN) 400 MG capsule, Take 400 mg by mouth 3 (Three) Times a Day. AND 2 CAPSULES AT BEDTIME, Disp: , Rfl:   •  HYDROcodone-acetaminophen (NORCO) 5-325 MG per tablet, Take 1 tablet by mouth Every 12 (Twelve) Hours As Needed for Moderate Pain ., Disp: 60 tablet, Rfl: 0  •  insulin aspart (NOVOLOG FLEXPEN) 100 UNIT/ML solution pen-injector sc pen, 20 units sq before breakfast then 25 units sq before lunch and dinner (Patient taking differently: 3 (Three) Times a Day With Meals. 20 units sq before breakfast then 25 units sq before lunch and dinner), Disp: 15 mL, Rfl: 6  •  Insulin Pen Needle (ULTICARE MINI PEN NEEDLES) 31G X 6 MM misc, To check blood sugar once daily, Disp: 100 each, Rfl: 3  •  levothyroxine (SYNTHROID, LEVOTHROID) 75  "MCG tablet, Take 75 mcg by mouth Every Morning., Disp: , Rfl:   •  melatonin 5 MG tablet tablet, Take 5 mg by mouth At Night As Needed., Disp: , Rfl:   •  metoprolol tartrate (LOPRESSOR) 12.5 MG half tablet, Take 12.5 mg by mouth 2 (Two) Times a Day., Disp: , Rfl:   •  Needle, Disp, (BD DISP NEEDLES) 30G X 1/2\" misc, To be used 3 times daily with Novolog Flexpen., Disp: 100 each, Rfl: 5  •  O2 (OXYGEN), Inhale 2 L/min Every Night., Disp: , Rfl:   •  omeprazole (priLOSEC) 40 MG capsule, Take 40 mg by mouth Every Morning., Disp: , Rfl:   •  potassium chloride (K-DUR) 10 MEQ CR tablet, Take 10 mEq by mouth Every Morning., Disp: , Rfl:   •  thiamine (VITAMIN B-1) 100 MG tablet, Take 100 mg by mouth Daily., Disp: , Rfl:   •  TRESIBA FLEXTOUCH 200 UNIT/ML solution pen-injector, Inject 80 Units under the skin Every Night., Disp: , Rfl:   •  ULTICARE MICRO PEN NEEDLES 32G X 4 MM misc, , Disp: , Rfl:   •  ULTICARE PEN NEEDLES 29G X 12MM misc, , Disp: , Rfl:      Objective:     Vitals:    11/07/17 1257   BP: 106/62   Pulse: 85   Weight: 213 lb (96.6 kg)   Height: 62\" (157.5 cm)     Body mass index is 38.96 kg/(m^2).    Physical Exam   Constitutional: She is oriented to person, place, and time.   Obese   HENT:   Head: Normocephalic.   Nose: Nose normal.   Mouth/Throat: Oropharynx is clear and moist.   Eyes: Conjunctivae and EOM are normal. Pupils are equal, round, and reactive to light.   Neck: Normal range of motion.   Cannot assess for JVD due to habitus   Cardiovascular: Normal rate, regular rhythm, normal heart sounds and intact distal pulses.    No murmur heard.  Pulmonary/Chest: Effort normal.   Abdominal: Soft.   Obesity limits abdominal exam   Musculoskeletal: Normal range of motion. She exhibits no edema.   Neurological: She is alert and oriented to person, place, and time. No cranial nerve deficit.   Skin: Skin is warm and dry. No rash noted.   Psychiatric: She has a normal mood and affect. Her behavior is normal. " Judgment and thought content normal.   Vitals reviewed.      Procedures      EKG from 11/3/2017 -- NSR, LAFB, RBBB, no change from prior    Assessment:       Diagnosis Plan   1. Abnormal cardiovascular stress test     2. Atypical chest pain     3. Preoperative cardiovascular examination     4. Essential hypertension            Plan:       Ms. Singh will be undergoing intermediate risk surgery.  She has diabetes, hypertension, and poor exercise tolerance.  She had one episode of chest pain two months ago, which has not recurred.  She had essentially normal coronaries in 2013.  The stress test that was recently performed shows a defect that is really very small and very mild; this is a low risk study and may possibly be a false positive due to her body habitus.  As she has not had recurrent symptoms of chest pain, I do not feel she needs an invasive evaluation prior to surgery, or even afterwards.      Her blood pressure is a little low.  Given her history of orthostasis (presumably due to diabetic dysautonomia), she may be overtreated. I will defer this to Dr. Mercado.      Sincerely,       Kehinde Sanon MD

## 2017-11-14 ENCOUNTER — OFFICE VISIT (OUTPATIENT)
Dept: INTERNAL MEDICINE | Facility: CLINIC | Age: 77
End: 2017-11-14

## 2017-11-14 VITALS
OXYGEN SATURATION: 96 % | SYSTOLIC BLOOD PRESSURE: 122 MMHG | HEART RATE: 70 BPM | DIASTOLIC BLOOD PRESSURE: 80 MMHG | HEIGHT: 62 IN

## 2017-11-14 DIAGNOSIS — E11.43 TYPE 2 DIABETES MELLITUS WITH DIABETIC AUTONOMIC NEUROPATHY, WITH LONG-TERM CURRENT USE OF INSULIN (HCC): ICD-10-CM

## 2017-11-14 DIAGNOSIS — G47.33 OBSTRUCTIVE SLEEP APNEA SYNDROME: ICD-10-CM

## 2017-11-14 DIAGNOSIS — I10 ESSENTIAL HYPERTENSION: ICD-10-CM

## 2017-11-14 DIAGNOSIS — Z79.4 TYPE 2 DIABETES MELLITUS WITH DIABETIC AUTONOMIC NEUROPATHY, WITH LONG-TERM CURRENT USE OF INSULIN (HCC): ICD-10-CM

## 2017-11-14 DIAGNOSIS — S83.232D COMPLEX TEAR OF MEDIAL MENISCUS OF LEFT KNEE AS CURRENT INJURY, SUBSEQUENT ENCOUNTER: ICD-10-CM

## 2017-11-14 DIAGNOSIS — R09.02 HYPOXIA: Primary | ICD-10-CM

## 2017-11-14 LAB — GLUCOSE BLDC GLUCOMTR-MCNC: 486 MG/DL (ref 70–130)

## 2017-11-14 PROCEDURE — 99215 OFFICE O/P EST HI 40 MIN: CPT | Performed by: INTERNAL MEDICINE

## 2017-11-14 NOTE — PROGRESS NOTES
Subjective     Joya Singh is a 77 y.o. female, who presents with a chief complaint of   Chief Complaint   Patient presents with   • Follow-up     Hospital Follow Up   • Dizziness       HPI   The pt is here for follow up.      Her knee surgery was postponed because of hypoxia and respiratory infection.  She is feeling much better now.  She is using oxygen at night.  She She is in a wheelchair today because of her knee pain.  Hospital records reviewed and medication list reconciled.  She is now on oxygen at night.  She needs follow up with pulmonary in 6-8 weeks.     HTN - the pt's lisinopril was stopped and metoprolol added.  She still feels like her blood pressure goes up and down quite a bit.  Her cough is some better off the lisinopril.  She is on lasix and has lots of LE edema if she misses a dose.      DM - this am her glucose was 113, yest at lunch 216.  She forgot her log today.  Her last a1c was 8.6.  She is on novolog tid (20 units with breakfast and 25 units with lunch and dinner).  She takes tresiba 80 units at night.  She does well checking glucoses at breakfast and dinner but often forgets during thhe day.  Hypoglycemia has been an issues for her in the past.      She also needs to go to the dentist and have dental work completed and will have to come off her blood thinner for this.     The pt has plans for upcoming knee surgery with dr. Wylie.  When this is rescheduled the pt will let me know.  She will need to be on lovenox for bridging anticoagulation and will need adjustments to bp and HTN meds.      Anxiety and depression - she is on cymbalta and wellbutrin.  The pt still has some issues with anxiety.  She feels like she is doing ok and her care manager from [facility] helps her.      The following portions of the patient's history were reviewed and updated as appropriate: allergies, current medications, past family history, past medical history, past social history, past surgical history and problem  list.    Allergies: Lisinopril; Morphine and related; Baclofen; Codeine; Morphine; and Penicillins    Review of Systems   Constitutional: Negative.    HENT: Negative.    Eyes: Negative.    Respiratory: Positive for cough.    Cardiovascular: Negative.    Gastrointestinal: Negative.    Endocrine: Negative.    Genitourinary: Negative.    Musculoskeletal:        Knee pain   Skin: Negative.    Allergic/Immunologic: Negative.    Neurological: Negative.    Hematological: Negative.    Psychiatric/Behavioral: Negative.    All other systems reviewed and are negative.      Objective     Wt Readings from Last 3 Encounters:   11/07/17 213 lb (96.6 kg)   11/03/17 213 lb 9.6 oz (96.9 kg)   10/05/17 212 lb (96.2 kg)     Temp Readings from Last 3 Encounters:   11/06/17 97.9 °F (36.6 °C) (Oral)   07/07/17 98 °F (36.7 °C) (Oral)   06/20/17 97.6 °F (36.4 °C) (Oral)     BP Readings from Last 3 Encounters:   11/14/17 122/80   11/07/17 106/62   11/06/17 113/65     Pulse Readings from Last 3 Encounters:   11/14/17 70   11/07/17 85   11/06/17 68     There is no height or weight on file to calculate BMI.  SpO2 Readings from Last 3 Encounters:   11/14/17 96%   11/06/17 94%   10/05/17 94%       Physical Exam   Constitutional: She is oriented to person, place, and time. She appears well-developed and well-nourished. No distress.   HENT:   Head: Normocephalic and atraumatic.   Right Ear: External ear normal.   Left Ear: External ear normal.   Nose: Nose normal.   Mouth/Throat: Oropharynx is clear and moist.   Eyes: Conjunctivae and EOM are normal. Pupils are equal, round, and reactive to light.   Neck: Normal range of motion. Neck supple.   Cardiovascular: Normal rate, regular rhythm, normal heart sounds and intact distal pulses.    Pulmonary/Chest: Effort normal and breath sounds normal. No respiratory distress. She has no wheezes.   Musculoskeletal:   Pt in wheelchair.    Decreased ROM of left knee.     Neurological: She is alert and oriented  to person, place, and time.   Skin: Skin is warm and dry.   Psychiatric: She has a normal mood and affect. Her behavior is normal. Judgment and thought content normal.   Nursing note and vitals reviewed.      Results for orders placed or performed during the hospital encounter of 11/03/17   Influenza Antigen, Rapid - Swab, Nasopharynx   Result Value Ref Range    Influenza A Ag, EIA Negative Negative    Influenza B Ag, EIA Negative Negative   Respiratory Panel, PCR - Swab, Nasopharynx   Result Value Ref Range    ADENOVIRUS, PCR Not Detected Not Detected    Coronavirus 229E Not Detected Not Detected    Coronavirus HKU1 Not Detected Not Detected    Coronavirus NL63 Not Detected Not Detected    Coronavirus OC43 Not Detected Not Detected    Human Metapneumovirus Not Detected Not Detected    Human Rhinovirus/Enterovirus Not Detected Not Detected    Influenza B PCR Not Detected Not Detected    Parainfluenza Virus 1 Not Detected Not Detected    Parainfluenza Virus 2 Not Detected Not Detected    Parainfluenza Virus 3 Not Detected Not Detected    Parainfluenza Virus 4 Not Detected Not Detected    Bordetella pertussis pcr Not Detected Not Detected    Influenza 2009 H1N1 by PCR Not Detected Not Detected    Chlamydophila pneumoniae PCR Not Detected Not Detected    Mycoplasma pneumo by PCR Not Detected Not Detected    Influenza A PCR Not Detected Not Detected    Influenza A H3 Not Detected Not Detected    Influenza A H1 Not Detected Not Detected    RSV, PCR Not Detected Not Detected   Comprehensive Metabolic Panel   Result Value Ref Range    Glucose 106 (H) 65 - 99 mg/dL    BUN 42 (H) 8 - 23 mg/dL    Creatinine 2.17 (H) 0.57 - 1.00 mg/dL    Sodium 142 136 - 145 mmol/L    Potassium 4.7 3.5 - 5.2 mmol/L    Chloride 102 98 - 107 mmol/L    CO2 27.9 22.0 - 29.0 mmol/L    Calcium 9.3 8.8 - 10.5 mg/dL    Total Protein 7.2 6.0 - 8.5 g/dL    Albumin 3.70 3.50 - 5.20 g/dL    ALT (SGPT) 6 5 - 33 U/L    AST (SGOT) 8 5 - 32 U/L    Alkaline  Phosphatase 130 (H) 40 - 129 U/L    Total Bilirubin 0.4 0.2 - 1.2 mg/dL    eGFR Non African Amer 22 (L) >60 mL/min/1.73    Globulin 3.5 gm/dL    A/G Ratio 1.1 g/dL    BUN/Creatinine Ratio 19.4 7.0 - 25.0    Anion Gap 12.1 mmol/L   Protime-INR   Result Value Ref Range    Protime 15.1 (H) 12.1 - 15.0 Seconds    INR 1.18 (H) 0.90 - 1.10   aPTT   Result Value Ref Range    PTT 33.6 24.3 - 38.1 seconds   Urinalysis With / Culture If Indicated - Urine, Clean Catch   Result Value Ref Range    Color, UA Yellow Yellow, Straw    Appearance, UA Clear Clear    pH, UA 5.5 4.5 - 8.0    Specific Gravity, UA 1.015 1.003 - 1.030    Glucose, UA Negative Negative    Ketones, UA Negative Negative, 80 mg/dL (3+), >=160 mg/dL (4+)    Bilirubin, UA Negative Negative    Blood, UA Negative Negative    Protein, UA Negative Negative    Leuk Esterase, UA Negative Negative    Nitrite, UA Negative Negative    Urobilinogen, UA 0.2 E.U./dL 0.2 - 1.0 E.U./dL   BNP   Result Value Ref Range    proBNP 86.7 5.0 - 450.0 pg/mL   D-dimer, Quantitative   Result Value Ref Range    D-Dimer, Quantitative 0.22 0.00 - 0.46 MCGFEU/mL   TSH   Result Value Ref Range    TSH 3.160 0.270 - 4.200 mIU/mL   Troponin   Result Value Ref Range    Troponin T <0.010 0.000 - 0.030 ng/mL   CBC Auto Differential   Result Value Ref Range    WBC 8.26 4.80 - 10.80 10*3/mm3    RBC 3.13 (L) 4.20 - 5.40 10*6/mm3    Hemoglobin 10.1 (L) 12.0 - 16.0 g/dL    Hematocrit 31.2 (L) 37.0 - 47.0 %    MCV 99.7 (H) 81.0 - 99.0 fL    MCH 32.3 (H) 27.0 - 31.0 pg    MCHC 32.4 31.0 - 37.0 g/dL    RDW 17.1 (H) 11.5 - 14.5 %    RDW-SD 62.5 (H) 37.0 - 54.0 fl    MPV 12.1 (H) 7.4 - 10.4 fL    Platelets 308 140 - 500 10*3/mm3    Neutrophil % 55.4 45.0 - 70.0 %    Lymphocyte % 36.3 20.0 - 45.0 %    Monocyte % 3.3 3.0 - 8.0 %    Eosinophil % 3.8 0.0 - 4.0 %    Basophil % 0.7 0.0 - 2.0 %    Immature Grans % 0.5 0.0 - 0.5 %    Neutrophils, Absolute 4.58 1.50 - 8.30 10*3/mm3    Lymphocytes, Absolute 3.00 0.60  - 4.80 10*3/mm3    Monocytes, Absolute 0.27 0.00 - 1.00 10*3/mm3    Eosinophils, Absolute 0.31 (H) 0.10 - 0.30 10*3/mm3    Basophils, Absolute 0.06 0.00 - 0.20 10*3/mm3    Immature Grans, Absolute 0.04 (H) 0.00 - 0.03 10*3/mm3    nRBC 0.0 0.0 - 0.0 /100 WBC   Troponin   Result Value Ref Range    Troponin T <0.010 0.000 - 0.030 ng/mL   Troponin   Result Value Ref Range    Troponin T <0.010 0.000 - 0.030 ng/mL   Troponin   Result Value Ref Range    Troponin T <0.010 0.000 - 0.030 ng/mL   CBC Auto Differential   Result Value Ref Range    WBC 8.32 4.80 - 10.80 10*3/mm3    RBC 3.29 (L) 4.20 - 5.40 10*6/mm3    Hemoglobin 10.5 (L) 12.0 - 16.0 g/dL    Hematocrit 32.4 (L) 37.0 - 47.0 %    MCV 98.5 81.0 - 99.0 fL    MCH 31.9 (H) 27.0 - 31.0 pg    MCHC 32.4 31.0 - 37.0 g/dL    RDW 16.8 (H) 11.5 - 14.5 %    RDW-SD 60.7 (H) 37.0 - 54.0 fl    MPV 12.1 (H) 7.4 - 10.4 fL    Platelets 323 140 - 500 10*3/mm3    Neutrophil % 82.3 (H) 45.0 - 70.0 %    Lymphocyte % 10.5 (L) 20.0 - 45.0 %    Monocyte % 0.4 (L) 3.0 - 8.0 %    Eosinophil % 0.1 0.0 - 4.0 %    Basophil % 0.4 0.0 - 2.0 %    Immature Grans % 6.3 (H) 0.0 - 0.5 %    Neutrophils, Absolute 6.86 1.50 - 8.30 10*3/mm3    Lymphocytes, Absolute 0.87 0.60 - 4.80 10*3/mm3    Monocytes, Absolute 0.03 0.00 - 1.00 10*3/mm3    Eosinophils, Absolute 0.01 (L) 0.10 - 0.30 10*3/mm3    Basophils, Absolute 0.03 0.00 - 0.20 10*3/mm3    Immature Grans, Absolute 0.52 (H) 0.00 - 0.03 10*3/mm3    nRBC 0.0 0.0 - 0.0 /100 WBC   Comprehensive Metabolic Panel   Result Value Ref Range    Glucose 379 (H) 65 - 99 mg/dL    BUN 48 (H) 8 - 23 mg/dL    Creatinine 2.19 (H) 0.57 - 1.00 mg/dL    Sodium 139 136 - 145 mmol/L    Potassium 5.5 (H) 3.5 - 5.2 mmol/L    Chloride 101 98 - 107 mmol/L    CO2 24.9 22.0 - 29.0 mmol/L    Calcium 9.2 8.8 - 10.5 mg/dL    Total Protein 7.1 6.0 - 8.5 g/dL    Albumin 3.90 3.50 - 5.20 g/dL    ALT (SGPT) 7 5 - 33 U/L    AST (SGOT) 10 5 - 32 U/L    Alkaline Phosphatase 131 (H) 40 - 129  U/L    Total Bilirubin 0.4 0.2 - 1.2 mg/dL    eGFR Non African Amer 22 (L) >60 mL/min/1.73    Globulin 3.2 gm/dL    A/G Ratio 1.2 g/dL    BUN/Creatinine Ratio 21.9 7.0 - 25.0    Anion Gap 13.1 mmol/L   BNP   Result Value Ref Range    proBNP 214.0 0.0 - 1800.0 pg/mL   Scan Slide   Result Value Ref Range    RBC Morphology Normal Normal    WBC Morphology Normal Normal    Platelet Morphology Normal Normal   Blood Gas, Arterial   Result Value Ref Range    Site Left Radial     Lakhwinder's Test Positive     pH, Arterial 7.408 7.350 - 7.450 pH units    pCO2, Arterial 37.7 35.0 - 45.0 mm Hg    pO2, Arterial 87.7 83.0 - 108.0 mm Hg    HCO3, Arterial 19.8 (L) 20.0 - 26.0 mmol/L    Base Excess, Arterial 3.1 (H) 0.0 - 2.0 mmol/L    O2 Saturation, Arterial 97.0 94.0 - 99.0 %    Temperature 37.0 C    Barometric Pressure for Blood Gas 742 mmHg    Modality Nasal Cannula     Flow Rate 2.0 lpm    Ventilator Mode NA    Glucose, Random   Result Value Ref Range    Glucose 501 (C) 65 - 99 mg/dL   Glucose, Random   Result Value Ref Range    Glucose 482 (C) 65 - 99 mg/dL   POC Glucose Fingerstick   Result Value Ref Range    Glucose 65 (L) 70 - 130 mg/dL   POC Glucose Fingerstick   Result Value Ref Range    Glucose 227 (H) 70 - 130 mg/dL   POC Glucose Fingerstick   Result Value Ref Range    Glucose 324 (H) 70 - 130 mg/dL   POC Glucose Fingerstick   Result Value Ref Range    Glucose 476 (C) 70 - 130 mg/dL   POC Glucose Fingerstick   Result Value Ref Range    Glucose 447 (H) 70 - 130 mg/dL   POC Glucose Fingerstick   Result Value Ref Range    Glucose 327 (H) 70 - 130 mg/dL   POC Glucose Fingerstick   Result Value Ref Range    Glucose 284 (H) 70 - 130 mg/dL   POC Glucose Fingerstick   Result Value Ref Range    Glucose 143 (H) 70 - 130 mg/dL   POC Glucose Fingerstick   Result Value Ref Range    Glucose 263 (H) 70 - 130 mg/dL   POC Glucose Fingerstick   Result Value Ref Range    Glucose 272 (H) 70 - 130 mg/dL   POC Glucose Fingerstick   Result Value  Ref Range    Glucose 221 (H) 70 - 130 mg/dL   POC Glucose Fingerstick   Result Value Ref Range    Glucose 283 (H) 70 - 130 mg/dL       Assessment/Plan   Joya was seen today for follow-up and dizziness.    Diagnoses and all orders for this visit:    Hypoxia - sx resolved.    -     Ambulatory Referral to Pulmonology    Essential hypertension - Pt still with some orthostasis.  She likely has some autonomic dysfunction from her diabetes.  Cut lasix down to every other day.     Obstructive sleep apnea syndrome - cont night time oxygen.  -     Ambulatory Referral to Pulmonology    Type 2 diabetes mellitus with diabetic autonomic neuropathy, with long-term current use of insulin - discussed frequent checking of glucoses to help with improved control.      Complex tear of medial meniscus of left knee as current injury, subsequent encounter - The pt has plans for upcoming knee surgery with dr. Wylie.  When this is rescheduled the pt will let me know.  She will need to be on lovenox for bridging anticoagulation and will need adjustments to bp and HTN meds.      45 minutes spent with patient and her son with >50% of visit spent in counseling about htn, orthostatis, oxygen use, DM and frequent glucose monitoring and management plan for upcoming knee surgery and dental work.    Outpatient Medications Prior to Visit   Medication Sig Dispense Refill   • ACCU-CHEK FASTCLIX LANCETS misc TEST 3-4 TIMES DAILY AS DIRECTED 120 each 10   • acetaminophen (TYLENOL) 325 MG tablet Take 2 tablets by mouth Every 6 (Six) Hours As Needed for Mild Pain . OTC product 40 tablet 0   • Alcohol Swabs (B-D SINGLE USE SWABS REGULAR) pads      • apixaban (ELIQUIS) 2.5 MG tablet tablet Take 1 tablet by mouth 2 (Two) Times a Day. 60 tablet 2   • aspirin 325 MG tablet Take 325 mg by mouth Daily.     • atorvastatin (LIPITOR) 10 MG tablet Take 10 mg by mouth Every Night.     • Blood Glucose Monitoring Suppl (ACCU-CHEK KENNETH SMARTVIEW) W/DEVICE kit USE AS  "DIRECTED     • buPROPion SR (WELLBUTRIN SR) 150 MG 12 hr tablet Take 150 mg by mouth Every Night.     • Calcium Carb-Cholecalciferol (CALCIUM PLUS VITAMIN D3 PO) Take 1 tablet by mouth Every Morning.     • Cyanocobalamin (VITAMELTS ENERGY VITAMIN B-12) 1500 MCG tablet dispersible Take 1 tablet by mouth Every Morning.     • cyclobenzaprine (FLEXERIL) 10 MG tablet Take 10 mg by mouth 3 (Three) Times a Day As Needed for Muscle Spasms.     • DULoxetine (CYMBALTA) 60 MG capsule Take 60 mg by mouth Every Morning.     • furosemide (LASIX) 20 MG tablet Take 20 mg by mouth Every Morning.     • gabapentin (NEURONTIN) 400 MG capsule Take 400 mg by mouth 3 (Three) Times a Day. AND 2 CAPSULES AT BEDTIME     • HYDROcodone-acetaminophen (NORCO) 5-325 MG per tablet Take 1 tablet by mouth Every 12 (Twelve) Hours As Needed for Moderate Pain . 60 tablet 0   • insulin aspart (NOVOLOG FLEXPEN) 100 UNIT/ML solution pen-injector sc pen 20 units sq before breakfast then 25 units sq before lunch and dinner (Patient taking differently: 3 (Three) Times a Day With Meals. 20 units sq before breakfast then 25 units sq before lunch and dinner) 15 mL 6   • Insulin Pen Needle (ULTICARE MINI PEN NEEDLES) 31G X 6 MM misc To check blood sugar once daily 100 each 3   • levothyroxine (SYNTHROID, LEVOTHROID) 75 MCG tablet Take 75 mcg by mouth Every Morning.     • melatonin 5 MG tablet tablet Take 5 mg by mouth At Night As Needed.     • metoprolol tartrate (LOPRESSOR) 12.5 MG half tablet Take 12.5 mg by mouth 2 (Two) Times a Day.     • Needle, Disp, (BD DISP NEEDLES) 30G X 1/2\" misc To be used 3 times daily with Novolog Flexpen. 100 each 5   • O2 (OXYGEN) Inhale 2 L/min Every Night.     • omeprazole (priLOSEC) 40 MG capsule Take 40 mg by mouth Every Morning.     • potassium chloride (K-DUR) 10 MEQ CR tablet Take 10 mEq by mouth Every Morning.     • thiamine (VITAMIN B-1) 100 MG tablet Take 100 mg by mouth Daily.     • TRESIBA FLEXTOUCH 200 UNIT/ML " solution pen-injector Inject 80 Units under the skin Every Night.     • ULTICARE MICRO PEN NEEDLES 32G X 4 MM misc      • ULTICARE PEN NEEDLES 29G X 12MM misc        No facility-administered medications prior to visit.      No orders of the defined types were placed in this encounter.    [unfilled]  There are no discontinued medications.      Return in about 4 weeks (around 12/12/2017) for Recheck, labs.

## 2017-11-28 ENCOUNTER — OFFICE VISIT (OUTPATIENT)
Dept: ORTHOPEDIC SURGERY | Facility: CLINIC | Age: 77
End: 2017-11-28

## 2017-11-28 DIAGNOSIS — M19.012 PRIMARY LOCALIZED OSTEOARTHROSIS OF LEFT SHOULDER REGION: ICD-10-CM

## 2017-11-28 DIAGNOSIS — M84.469K INSUFFICIENCY FRACTURE OF TIBIA WITH NONUNION, SUBSEQUENT ENCOUNTER: Primary | ICD-10-CM

## 2017-11-28 DIAGNOSIS — S83.232D COMPLEX TEAR OF MEDIAL MENISCUS OF LEFT KNEE AS CURRENT INJURY, SUBSEQUENT ENCOUNTER: ICD-10-CM

## 2017-11-28 PROCEDURE — 99213 OFFICE O/P EST LOW 20 MIN: CPT | Performed by: ORTHOPAEDIC SURGERY

## 2017-11-28 PROCEDURE — 20610 DRAIN/INJ JOINT/BURSA W/O US: CPT | Performed by: ORTHOPAEDIC SURGERY

## 2017-11-28 RX ORDER — BETAMETHASONE SODIUM PHOSPHATE AND BETAMETHASONE ACETATE 3; 3 MG/ML; MG/ML
12 INJECTION, SUSPENSION INTRA-ARTICULAR; INTRALESIONAL; INTRAMUSCULAR; SOFT TISSUE
Status: COMPLETED | OUTPATIENT
Start: 2017-11-28 | End: 2017-11-28

## 2017-11-28 RX ORDER — LIDOCAINE HYDROCHLORIDE 10 MG/ML
4 INJECTION, SOLUTION EPIDURAL; INFILTRATION; INTRACAUDAL; PERINEURAL
Status: COMPLETED | OUTPATIENT
Start: 2017-11-28 | End: 2017-11-28

## 2017-11-28 RX ORDER — BUPIVACAINE HYDROCHLORIDE 5 MG/ML
4 INJECTION, SOLUTION EPIDURAL; INTRACAUDAL
Status: COMPLETED | OUTPATIENT
Start: 2017-11-28 | End: 2017-11-28

## 2017-11-28 RX ADMIN — LIDOCAINE HYDROCHLORIDE 4 ML: 10 INJECTION, SOLUTION EPIDURAL; INFILTRATION; INTRACAUDAL; PERINEURAL at 12:03

## 2017-11-28 RX ADMIN — BETAMETHASONE SODIUM PHOSPHATE AND BETAMETHASONE ACETATE 12 MG: 3; 3 INJECTION, SUSPENSION INTRA-ARTICULAR; INTRALESIONAL; INTRAMUSCULAR; SOFT TISSUE at 12:02

## 2017-11-28 RX ADMIN — BETAMETHASONE SODIUM PHOSPHATE AND BETAMETHASONE ACETATE 12 MG: 3; 3 INJECTION, SUSPENSION INTRA-ARTICULAR; INTRALESIONAL; INTRAMUSCULAR; SOFT TISSUE at 12:03

## 2017-11-28 RX ADMIN — LIDOCAINE HYDROCHLORIDE 4 ML: 10 INJECTION, SOLUTION EPIDURAL; INFILTRATION; INTRACAUDAL; PERINEURAL at 12:02

## 2017-11-28 RX ADMIN — BUPIVACAINE HYDROCHLORIDE 4 ML: 5 INJECTION, SOLUTION EPIDURAL; INTRACAUDAL at 12:02

## 2017-11-28 NOTE — PROGRESS NOTES
Subjective:     Patient ID: Joya Singh is a 77 y.o. female.    Chief Complaint:  Follow-up left knee pain, medial meniscal tear, tibial subchondral insufficiency fracture  Follow-up left shoulder pain, DJD  History of Present Illness  Joya Singh returns to clinic today for evaluation of left knee, we've previously had her scheduled for arthroscopy with meniscal cartilage surgery as well as subchondroplasty but given medical issues patient's procedure was canceled, she is still being evaluated by pulmonology regards to her underlying lung issues.  She states she is continuing to have pain particularly along the medial aspect of left knee with radiation down into her leg, rates as a 7-8 out of 10, aching in nature, exacerbated with prolonged weightbearing, walking, rotational activities, minimal improvement with rest.    In regards to left shoulder patient isincreasing pain particularly along the posterior lateral lateral aspect left shoulder, exacerbated with overhead activities including lifting pushing and pulling, minimal improvement with rest.  Previous success with subacromial injection noted.  Does note associated crepitus on range of motion.  Rates current shoulder pain as a 6-7 out of 10 and describes as aching in nature. Denies associated numbness or tingling.        Social History     Occupational History   • Not on file.     Social History Main Topics   • Smoking status: Never Smoker   • Smokeless tobacco: Never Used      Comment: CAFFEINE USE: NONE   • Alcohol use No   • Drug use: No   • Sexual activity: Defer      Comment: EXERCISE - RARELY      Past Medical History:   Diagnosis Date   • Allergic rhinitis    • Anxiety    • Arthritis    • Bell's palsy    • CKD (chronic kidney disease) stage 3, GFR 30-59 ml/min    • Depression    • Diabetes mellitus     LAST A1C 6   • Diabetic gastroparesis 2/19/2016   • GERD (gastroesophageal reflux disease)    • H/O blood clots     LEFT LEG 7 OR 8 YEARS AGO   •  Hyperlipidemia    • Hypertension    • Hypothyroidism    • Normal coronary arteries     by cath 2013   • AARON (obstructive sleep apnea)     DOESNT WEAR REGULARLY   • PONV (postoperative nausea and vomiting)    • Skin cancer    • Stroke    • TIA (transient ischemic attack)     LAST TIA JULY 2017     Past Surgical History:   Procedure Laterality Date   • BACK SURGERY      HARDWARE   • CHOLECYSTECTOMY      OPEN   • COLONOSCOPY  2011    due for repeat in 2021   • HYSTERECTOMY      PARTIAL    • NECK SURGERY     • SPINE SURGERY     • UPPER GASTROINTESTINAL ENDOSCOPY  2014    gastritis.  done by dr. hastings       Family History   Problem Relation Age of Onset   • Lupus Mother    • Heart failure Mother 59   • Heart disease Other    • Hypertension Other    • Heart attack Father          Review of Systems   Constitutional: Negative for chills, diaphoresis, fever and unexpected weight change.   HENT: Negative for hearing loss, nosebleeds, sore throat and tinnitus.    Eyes: Negative for pain and visual disturbance.   Respiratory: Negative for cough, shortness of breath and wheezing.    Cardiovascular: Negative for chest pain and palpitations.   Gastrointestinal: Negative for abdominal pain, diarrhea, nausea and vomiting.   Endocrine: Negative for cold intolerance, heat intolerance and polydipsia.   Genitourinary: Negative for difficulty urinating, dysuria and hematuria.   Musculoskeletal: Positive for arthralgias. Negative for joint swelling and myalgias.   Skin: Negative for rash and wound.   Allergic/Immunologic: Negative for environmental allergies.   Neurological: Negative for dizziness, syncope and numbness.   Hematological: Does not bruise/bleed easily.   Psychiatric/Behavioral: Negative for dysphoric mood and sleep disturbance. The patient is not nervous/anxious.    All other systems reviewed and are negative.          Objective:  There were no vitals filed for this visit.  There were no vitals filed for this visit.  There  is no height or weight on file to calculate BMI.  General: No acute distress.  Resp: normal respiratory effort  Skin: no rashes or wounds; normal turgor  Psych: mood and affect appropriate; recent and remote memory intact     Ortho Exam    Left knee- AROM 5-120, 4+/5 strength, maximal TTP over medial joint line and medial proximal tibial plateau, moderate effusion, stable to varus and valgus stress at 5 and 30°.  Moderate tenderness along medial lateral patellar facet with positive active patellar compression test.    Left shoulder-maximal tenderness palpation laterally over subacromial space, positive Mejia and Neer's, positive empty can test, 4 minus out of 5 strength on resisted forward flexion and external rotation.  Active forward flexion 120°, active external rotation 35°.    Imaging:    Assessment:       1. Insufficiency fracture of tibia with nonunion, subsequent encounter    2. Complex tear of medial meniscus of left knee as current injury, subsequent encounter    3. Primary localized osteoarthrosis of left shoulder region          Plan:  KENDRICK query complete.          Discussed treatment options at length with patient at today's visit. Given the fact that patient is still in the middle of a workup for pulmonology, we discussed other treatment options besides surgery, she would like to proceed with repeat injection today.  I will follow up with her in January after her pulmonology visit and if she does obtain a medical clearance at that time we can proceed with surgery.  Given the recurrence of pain to her left shoulder, we discussed options there is well, she would like to proceed with repeat injection to shoulder today as well.    Joya Samantha was in agreement with plan and had all questions answered.     Orders:  Orders Placed This Encounter   Procedures   • Large Joint Arthrocentesis   • Large Joint Arthrocentesis       Medications:  No orders of the defined types were placed in this  encounter.      Followup:  Return in about 2 months (around 1/28/2018).    Joya was seen today for follow-up and pain.    Diagnoses and all orders for this visit:    Insufficiency fracture of tibia with nonunion, subsequent encounter    Complex tear of medial meniscus of left knee as current injury, subsequent encounter    Primary localized osteoarthrosis of left shoulder region  -     Large Joint Arthrocentesis    Other orders  -     Large Joint Arthrocentesis          Dragon transcription disclaimer     Much of this encounter note is an electronic transcription/translation of spoken language to printed text. The electronic translation of spoken language may permit erroneous, or at times, nonsensical words or phrases to be inadvertently transcribed. Although I have reviewed the note for such errors, some may still exist.  Large Joint Arthrocentesis  Date/Time: 11/28/2017 12:02 PM  Consent given by: patient  Site marked: site marked  Timeout: Immediately prior to procedure a time out was called to verify the correct patient, procedure, equipment, support staff and site/side marked as required   Supporting Documentation  Indications: pain   Procedure Details  Location: knee - L knee  Preparation: Patient was prepped and draped in the usual sterile fashion  Needle size: 22 G  Approach: superior  Medications administered: 4 mL lidocaine PF 1% 1 %; 4 mL bupivacaine (PF) 0.5 %; 12 mg betamethasone acetate-betamethasone sodium phosphate 6 (3-3) MG/ML  Patient tolerance: patient tolerated the procedure well with no immediate complications

## 2017-11-28 NOTE — PROGRESS NOTES
Procedure   Large Joint Arthrocentesis  Date/Time: 11/28/2017 12:03 PM  Consent given by: patient  Site marked: site marked  Timeout: Immediately prior to procedure a time out was called to verify the correct patient, procedure, equipment, support staff and site/side marked as required   Supporting Documentation  Indications: pain   Procedure Details  Location: shoulder - L subacromial bursa  Preparation: Patient was prepped and draped in the usual sterile fashion  Needle size: 22 G  Approach: lateral  Medications administered: 4 mL lidocaine PF 1% 1 %; 12 mg betamethasone acetate-betamethasone sodium phosphate 6 (3-3) MG/ML  Patient tolerance: patient tolerated the procedure well with no immediate complications

## 2017-11-29 ENCOUNTER — TELEPHONE (OUTPATIENT)
Dept: INTERNAL MEDICINE | Facility: CLINIC | Age: 77
End: 2017-11-29

## 2017-12-05 ENCOUNTER — TELEPHONE (OUTPATIENT)
Dept: INTERNAL MEDICINE | Facility: CLINIC | Age: 77
End: 2017-12-05

## 2017-12-05 ENCOUNTER — OFFICE VISIT (OUTPATIENT)
Dept: INTERNAL MEDICINE | Facility: CLINIC | Age: 77
End: 2017-12-05

## 2017-12-05 ENCOUNTER — LAB (OUTPATIENT)
Dept: INTERNAL MEDICINE | Facility: CLINIC | Age: 77
End: 2017-12-05

## 2017-12-05 VITALS — DIASTOLIC BLOOD PRESSURE: 60 MMHG | SYSTOLIC BLOOD PRESSURE: 98 MMHG | HEART RATE: 72 BPM | OXYGEN SATURATION: 94 %

## 2017-12-05 DIAGNOSIS — E10.9 BRITTLE DIABETES MELLITUS (HCC): ICD-10-CM

## 2017-12-05 DIAGNOSIS — Z79.01 WARFARIN ANTICOAGULATION: ICD-10-CM

## 2017-12-05 DIAGNOSIS — Z79.4 TYPE 2 DIABETES MELLITUS WITH DIABETIC AUTONOMIC NEUROPATHY, WITH LONG-TERM CURRENT USE OF INSULIN (HCC): ICD-10-CM

## 2017-12-05 DIAGNOSIS — I10 ESSENTIAL HYPERTENSION: ICD-10-CM

## 2017-12-05 DIAGNOSIS — R25.2 SPASM: ICD-10-CM

## 2017-12-05 DIAGNOSIS — E11.43 TYPE 2 DIABETES MELLITUS WITH DIABETIC AUTONOMIC NEUROPATHY, WITH LONG-TERM CURRENT USE OF INSULIN (HCC): ICD-10-CM

## 2017-12-05 DIAGNOSIS — E03.9 ACQUIRED HYPOTHYROIDISM: ICD-10-CM

## 2017-12-05 DIAGNOSIS — D64.9 CHRONIC ANEMIA: ICD-10-CM

## 2017-12-05 DIAGNOSIS — I95.1 ORTHOSTATIC HYPOTENSION: Primary | ICD-10-CM

## 2017-12-05 DIAGNOSIS — E11.22 CKD STAGE 3 DUE TO TYPE 2 DIABETES MELLITUS (HCC): ICD-10-CM

## 2017-12-05 DIAGNOSIS — N18.30 CKD STAGE 3 DUE TO TYPE 2 DIABETES MELLITUS (HCC): ICD-10-CM

## 2017-12-05 DIAGNOSIS — I82.499 DEEP VEIN THROMBOSIS (DVT) OF OTHER VEIN OF LOWER EXTREMITY, UNSPECIFIED CHRONICITY, UNSPECIFIED LATERALITY (HCC): ICD-10-CM

## 2017-12-05 DIAGNOSIS — E78.2 MIXED HYPERLIPIDEMIA: Primary | ICD-10-CM

## 2017-12-05 DIAGNOSIS — E78.2 MIXED HYPERLIPIDEMIA: ICD-10-CM

## 2017-12-05 PROBLEM — I95.9 LOW BLOOD PRESSURE: Status: ACTIVE | Noted: 2017-12-05

## 2017-12-05 LAB
ALBUMIN SERPL-MCNC: 3.8 G/DL (ref 3.5–5.2)
ALBUMIN/GLOB SERPL: 1.5 G/DL
ALP SERPL-CCNC: 149 U/L (ref 40–129)
ALT SERPL-CCNC: 9 U/L (ref 5–33)
AST SERPL-CCNC: 8 U/L (ref 5–32)
BASOPHILS # BLD AUTO: 0.11 10*3/MM3 (ref 0–0.2)
BASOPHILS NFR BLD AUTO: 1.1 % (ref 0–2)
BILIRUB SERPL-MCNC: 0.4 MG/DL (ref 0.2–1.2)
BUN SERPL-MCNC: 35 MG/DL (ref 8–23)
BUN/CREAT SERPL: 20.2 (ref 7–25)
CALCIUM SERPL-MCNC: 8.7 MG/DL (ref 8.8–10.5)
CHLORIDE SERPL-SCNC: 102 MMOL/L (ref 98–107)
CHOLEST SERPL-MCNC: 116 MG/DL (ref 0–200)
CO2 SERPL-SCNC: 31.6 MMOL/L (ref 22–29)
CREAT SERPL-MCNC: 1.73 MG/DL (ref 0.57–1)
EOSINOPHIL # BLD AUTO: 0.4 10*3/MM3 (ref 0.1–0.3)
EOSINOPHIL NFR BLD AUTO: 4 % (ref 0–4)
ERYTHROCYTE [DISTWIDTH] IN BLOOD BY AUTOMATED COUNT: 17.6 % (ref 11.5–14.5)
GFR SERPLBLD CREATININE-BSD FMLA CKD-EPI: 29 ML/MIN/1.73
GFR SERPLBLD CREATININE-BSD FMLA CKD-EPI: 35 ML/MIN/1.73
GLOBULIN SER CALC-MCNC: 2.6 GM/DL
GLUCOSE SERPL-MCNC: 29 MG/DL (ref 65–99)
HBA1C MFR BLD: 8.3 % (ref 4.8–5.6)
HCT VFR BLD AUTO: 31.6 % (ref 37–47)
HDLC SERPL-MCNC: 54 MG/DL (ref 40–60)
HGB BLD-MCNC: 10.1 G/DL (ref 12–16)
IMM GRANULOCYTES # BLD: 0.03 10*3/MM3 (ref 0–0.03)
IMM GRANULOCYTES NFR BLD: 0.3 % (ref 0–0.5)
INR PPP: 1.05 (ref 0.9–1.1)
LDLC SERPL CALC-MCNC: 51 MG/DL (ref 0–100)
LDLC/HDLC SERPL: 0.95 {RATIO}
LYMPHOCYTES # BLD AUTO: 3.56 10*3/MM3 (ref 0.6–4.8)
LYMPHOCYTES NFR BLD AUTO: 35.9 % (ref 20–45)
MCH RBC QN AUTO: 32.4 PG (ref 27–31)
MCHC RBC AUTO-ENTMCNC: 32 G/DL (ref 31–37)
MCV RBC AUTO: 101.3 FL (ref 81–99)
MONOCYTES # BLD AUTO: 0.44 10*3/MM3 (ref 0–1)
MONOCYTES NFR BLD AUTO: 4.4 % (ref 3–8)
NEUTROPHILS # BLD AUTO: 5.39 10*3/MM3 (ref 1.5–8.3)
NEUTROPHILS NFR BLD AUTO: 54.3 % (ref 45–70)
NRBC BLD AUTO-RTO: 0 /100 WBC (ref 0–0)
PLATELET # BLD AUTO: 346 10*3/MM3 (ref 140–500)
POTASSIUM SERPL-SCNC: 4.9 MMOL/L (ref 3.5–5.2)
PROT SERPL-MCNC: 6.4 G/DL (ref 6–8.5)
PROTHROMBIN TIME: 13.7 SECONDS (ref 12.1–15)
RBC # BLD AUTO: 3.12 10*6/MM3 (ref 4.2–5.4)
SODIUM SERPL-SCNC: 142 MMOL/L (ref 136–145)
T4 FREE SERPL-MCNC: 1.5 NG/DL (ref 0.93–1.7)
TRIGL SERPL-MCNC: 53 MG/DL (ref 0–150)
TSH SERPL DL<=0.005 MIU/L-ACNC: 2.67 MIU/ML (ref 0.27–4.2)
VIT B12 SERPL-MCNC: 350 PG/ML (ref 232–1245)
VLDLC SERPL CALC-MCNC: 10.6 MG/DL (ref 7–27)
WBC # BLD AUTO: 9.93 10*3/MM3 (ref 4.8–10.8)

## 2017-12-05 PROCEDURE — 99214 OFFICE O/P EST MOD 30 MIN: CPT | Performed by: INTERNAL MEDICINE

## 2017-12-05 NOTE — PROGRESS NOTES
Subjective     Joya Singh is a 77 y.o. female, who presents with a chief complaint of   Chief Complaint   Patient presents with   • Hypotension     Feeling light headed and dizzy       HPI   The pt dropped by the office today and told the staff she felt dizzy.  Her bp was checked and she was worked in for evaluation.  She needs knee surgery but her bp has been labile.  She is on lasix 20mg bid, metoprolol    Her muscle spasms are also back. She is taking flexeril.  She gets panic attacks and confusion when she takes baclofen.     The following portions of the patient's history were reviewed and updated as appropriate: allergies, current medications, past family history, past medical history, past social history, past surgical history and problem list.    Allergies: Lisinopril; Morphine and related; Baclofen; Codeine; Morphine; and Penicillins    Review of Systems   Constitutional: Negative.    HENT: Negative.    Eyes: Negative.    Respiratory: Negative.    Cardiovascular: Negative.    Gastrointestinal: Negative.    Endocrine: Negative.    Genitourinary: Negative.    Musculoskeletal: Negative.    Skin: Negative.    Allergic/Immunologic: Negative.    Neurological: Positive for dizziness and light-headedness.   Hematological: Negative.    Psychiatric/Behavioral: Negative.    All other systems reviewed and are negative.      Objective     Wt Readings from Last 3 Encounters:   11/07/17 96.6 kg (213 lb)   11/03/17 96.9 kg (213 lb 9.6 oz)   10/05/17 96.2 kg (212 lb)     Temp Readings from Last 3 Encounters:   11/06/17 97.9 °F (36.6 °C) (Oral)   07/07/17 98 °F (36.7 °C) (Oral)   06/20/17 97.6 °F (36.4 °C) (Oral)     BP Readings from Last 3 Encounters:   12/05/17 98/60   11/14/17 122/80   11/07/17 106/62     Pulse Readings from Last 3 Encounters:   12/05/17 72   11/14/17 70   11/07/17 85     There is no height or weight on file to calculate BMI.  SpO2 Readings from Last 3 Encounters:   12/05/17 94%   11/14/17 96%   11/06/17  94%       Physical Exam   Constitutional: She is oriented to person, place, and time. She appears well-developed and well-nourished. No distress.   HENT:   Head: Normocephalic and atraumatic.   Right Ear: External ear normal.   Left Ear: External ear normal.   Nose: Nose normal.   Mouth/Throat: Oropharynx is clear and moist.   Eyes: Conjunctivae and EOM are normal. Pupils are equal, round, and reactive to light.   Neck: Normal range of motion. Neck supple.   Cardiovascular: Normal rate, regular rhythm, normal heart sounds and intact distal pulses.    Pulmonary/Chest: Effort normal and breath sounds normal. No respiratory distress. She has no wheezes.   Musculoskeletal: Normal range of motion.   1 + LE edema   Neurological: She is alert and oriented to person, place, and time.   Skin: Skin is warm and dry.   Psychiatric: She has a normal mood and affect. Her behavior is normal. Judgment and thought content normal.   Nursing note and vitals reviewed.      Results for orders placed or performed in visit on 12/05/17   Comprehensive metabolic panel   Result Value Ref Range    Glucose 29 (C) 65 - 99 mg/dL    BUN 35 (H) 8 - 23 mg/dL    Creatinine 1.73 (H) 0.57 - 1.00 mg/dL    eGFR Non African Am 29 (L) >60 mL/min/1.73    eGFR African Am 35 (L) >60 mL/min/1.73    BUN/Creatinine Ratio 20.2 7.0 - 25.0    Sodium 142 136 - 145 mmol/L    Potassium 4.9 3.5 - 5.2 mmol/L    Chloride 102 98 - 107 mmol/L    Total CO2 31.6 (H) 22.0 - 29.0 mmol/L    Calcium 8.7 (L) 8.8 - 10.5 mg/dL    Total Protein 6.4 6.0 - 8.5 g/dL    Albumin 3.80 3.50 - 5.20 g/dL    Globulin 2.6 gm/dL    A/G Ratio 1.5 g/dL    Total Bilirubin 0.4 0.2 - 1.2 mg/dL    Alkaline Phosphatase 149 (H) 40 - 129 U/L    AST (SGOT) 8 5 - 32 U/L    ALT (SGPT) 9 5 - 33 U/L   Lipid Panel With LDL/HDL Ratio   Result Value Ref Range    Total Cholesterol 116 0 - 200 mg/dL    Triglycerides 53 0 - 150 mg/dL    HDL Cholesterol 54 40 - 60 mg/dL    VLDL Cholesterol 10.6 7 - 27 mg/dL    LDL  Cholesterol  51 0 - 100 mg/dL    LDL/HDL Ratio 0.95    Hemoglobin A1c   Result Value Ref Range    Hemoglobin A1C 8.30 (H) 4.80 - 5.60 %   TSH   Result Value Ref Range    TSH 2.670 0.270 - 4.200 mIU/mL   T4, free   Result Value Ref Range    Free T4 1.50 0.93 - 1.70 ng/dL   Vitamin B12   Result Value Ref Range    Vitamin B-12 350 232 - 1245 pg/mL   Protime-INR   Result Value Ref Range    INR 1.05 0.90 - 1.10    Protime 13.7 12.1 - 15.0 Seconds   CBC w AUTO Differential   Result Value Ref Range    WBC 9.93 4.80 - 10.80 10*3/mm3    RBC 3.12 (L) 4.20 - 5.40 10*6/mm3    Hemoglobin 10.1 (L) 12.0 - 16.0 g/dL    Hematocrit 31.6 (L) 37.0 - 47.0 %    .3 (H) 81.0 - 99.0 fL    MCH 32.4 (H) 27.0 - 31.0 pg    MCHC 32.0 31.0 - 37.0 g/dL    RDW 17.6 (H) 11.5 - 14.5 %    Platelets 346 140 - 500 10*3/mm3    Neutrophil Rel % 54.3 45.0 - 70.0 %    Lymphocyte Rel % 35.9 20.0 - 45.0 %    Monocyte Rel % 4.4 3.0 - 8.0 %    Eosinophil Rel % 4.0 0.0 - 4.0 %    Basophil Rel % 1.1 0.0 - 2.0 %    Neutrophils Absolute 5.39 1.50 - 8.30 10*3/mm3    Lymphocytes Absolute 3.56 0.60 - 4.80 10*3/mm3    Monocytes Absolute 0.44 0.00 - 1.00 10*3/mm3    Eosinophils Absolute 0.40 (H) 0.10 - 0.30 10*3/mm3    Basophils Absolute 0.11 0.00 - 0.20 10*3/mm3    Immature Granulocyte Rel % 0.3 0.0 - 0.5 %    Immature Grans Absolute 0.03 0.00 - 0.03 10*3/mm3    nRBC 0.0 0.0 - 0.0 /100 WBC       Assessment/Plan   Joya was seen today for hypotension.    Diagnoses and all orders for this visit:    Orthostatic hypotension  Change furosemide to daily rather than bid dosing bc of dizziness.  Increase water intake, but cont to limit sodium intake    Spasm - flexeril prn.    Recheck 1 week.    Outpatient Medications Prior to Visit   Medication Sig Dispense Refill   • ACCU-CHEK FASTCLIX LANCETS misc TEST 3-4 TIMES DAILY AS DIRECTED 120 each 10   • acetaminophen (TYLENOL) 325 MG tablet Take 2 tablets by mouth Every 6 (Six) Hours As Needed for Mild Pain . OTC product 40  "tablet 0   • Alcohol Swabs (B-D SINGLE USE SWABS REGULAR) pads      • apixaban (ELIQUIS) 2.5 MG tablet tablet Take 1 tablet by mouth 2 (Two) Times a Day. 60 tablet 2   • aspirin 325 MG tablet Take 325 mg by mouth Daily.     • atorvastatin (LIPITOR) 10 MG tablet Take 10 mg by mouth Every Night.     • Blood Glucose Monitoring Suppl (ACCU-CHEK KENNETH SMARTVIEW) W/DEVICE kit USE AS DIRECTED     • buPROPion SR (WELLBUTRIN SR) 150 MG 12 hr tablet Take 150 mg by mouth Every Night.     • Calcium Carb-Cholecalciferol (CALCIUM PLUS VITAMIN D3 PO) Take 1 tablet by mouth Every Morning.     • Cyanocobalamin (VITAMELTS ENERGY VITAMIN B-12) 1500 MCG tablet dispersible Take 1 tablet by mouth Every Morning.     • cyclobenzaprine (FLEXERIL) 10 MG tablet Take 10 mg by mouth 3 (Three) Times a Day As Needed for Muscle Spasms.     • DULoxetine (CYMBALTA) 60 MG capsule Take 60 mg by mouth Every Morning.     • furosemide (LASIX) 20 MG tablet Take 20 mg by mouth Every Morning.     • gabapentin (NEURONTIN) 400 MG capsule Take 400 mg by mouth 3 (Three) Times a Day. AND 2 CAPSULES AT BEDTIME     • HYDROcodone-acetaminophen (NORCO) 5-325 MG per tablet Take 1 tablet by mouth Every 12 (Twelve) Hours As Needed for Moderate Pain . 60 tablet 0   • insulin aspart (NOVOLOG FLEXPEN) 100 UNIT/ML solution pen-injector sc pen 20 units sq before breakfast then 25 units sq before lunch and dinner (Patient taking differently: 3 (Three) Times a Day With Meals. 20 units sq before breakfast then 25 units sq before lunch and dinner) 15 mL 6   • Insulin Pen Needle (ULTICARE MINI PEN NEEDLES) 31G X 6 MM misc To check blood sugar once daily 100 each 3   • levothyroxine (SYNTHROID, LEVOTHROID) 75 MCG tablet Take 75 mcg by mouth Every Morning.     • melatonin 5 MG tablet tablet Take 5 mg by mouth At Night As Needed.     • metoprolol tartrate (LOPRESSOR) 12.5 MG half tablet Take 12.5 mg by mouth 2 (Two) Times a Day.     • Needle, Disp, (BD DISP NEEDLES) 30G X 1/2\" misc " To be used 3 times daily with Novolog Flexpen. 100 each 5   • O2 (OXYGEN) Inhale 2 L/min Every Night.     • omeprazole (priLOSEC) 40 MG capsule Take 40 mg by mouth Every Morning.     • potassium chloride (K-DUR) 10 MEQ CR tablet Take 10 mEq by mouth Every Morning.     • thiamine (VITAMIN B-1) 100 MG tablet Take 100 mg by mouth Daily.     • TRESIBA FLEXTOUCH 200 UNIT/ML solution pen-injector Inject 80 Units under the skin Every Night.     • ULTICARE MICRO PEN NEEDLES 32G X 4 MM misc      • ULTICARE PEN NEEDLES 29G X 12MM misc        No facility-administered medications prior to visit.      No orders of the defined types were placed in this encounter.    [unfilled]  There are no discontinued medications.      Return in about 1 week (around 12/12/2017) for Recheck.

## 2017-12-08 ENCOUNTER — TELEPHONE (OUTPATIENT)
Dept: INTERNAL MEDICINE | Facility: CLINIC | Age: 77
End: 2017-12-08

## 2017-12-08 NOTE — TELEPHONE ENCOUNTER
Patient has been advised and voiced understanding. Patient states she is still having dizzy spells on and off as seen on 12/5 OV. Patient states she does not eat very much but will start trying to eat more regular meals. Advised ER if patient symptoms fail to improve and contact office Monday to update Dr. Mercado on how patient is feeling.      ----- Message from Chari Mercado MD sent at 12/8/2017 10:48 AM EST -----  Call pt about labs.  a1c 8.3.  Pt to keep glucose log and check sugars at least 3 times a day.  She had hypoglycemia on labs.  Review importance of eating regularly with pt.

## 2017-12-18 ENCOUNTER — OFFICE VISIT (OUTPATIENT)
Dept: INTERNAL MEDICINE | Facility: CLINIC | Age: 77
End: 2017-12-18

## 2017-12-18 VITALS
OXYGEN SATURATION: 92 % | HEART RATE: 73 BPM | HEIGHT: 62 IN | SYSTOLIC BLOOD PRESSURE: 130 MMHG | DIASTOLIC BLOOD PRESSURE: 84 MMHG

## 2017-12-18 DIAGNOSIS — J32.9 SINUSITIS, UNSPECIFIED CHRONICITY, UNSPECIFIED LOCATION: Primary | ICD-10-CM

## 2017-12-18 DIAGNOSIS — H61.23 BILATERAL IMPACTED CERUMEN: ICD-10-CM

## 2017-12-18 DIAGNOSIS — E10.9 BRITTLE DIABETES MELLITUS (HCC): ICD-10-CM

## 2017-12-18 PROCEDURE — 69210 REMOVE IMPACTED EAR WAX UNI: CPT | Performed by: INTERNAL MEDICINE

## 2017-12-18 PROCEDURE — 99214 OFFICE O/P EST MOD 30 MIN: CPT | Performed by: INTERNAL MEDICINE

## 2017-12-18 RX ORDER — FLUTICASONE PROPIONATE 50 MCG
2 SPRAY, SUSPENSION (ML) NASAL DAILY
Qty: 1 BOTTLE | Refills: 2 | Status: SHIPPED | OUTPATIENT
Start: 2017-12-18 | End: 2018-01-17

## 2017-12-18 RX ORDER — CEFUROXIME AXETIL 250 MG/1
250 TABLET ORAL 2 TIMES DAILY
Qty: 20 TABLET | Refills: 0 | Status: SHIPPED | OUTPATIENT
Start: 2017-12-18 | End: 2018-03-16

## 2017-12-18 NOTE — PROGRESS NOTES
Subjective     Joya Singh is a 77 y.o. female, who presents with a chief complaint of   Chief Complaint   Patient presents with   • Follow-up     Had a dizzy spell, started last week.    • Hypertension       HPI   The pt is here bc of dizziness since last week.  She has had issues with dizziness in the past but she says this is worse.  She has been congested and had chills.  She denies myalgias or fever.  She is coughing.  Her head feels like it is floating but no headache.  Weight has been stable.  She can't hear well    Her glucoses lately have been better.  Glucose this am was 90.  She is adjusting insulin as needed at this time.  No hypoglycemia sx.         The following portions of the patient's history were reviewed and updated as appropriate: allergies, current medications, past family history, past medical history, past social history, past surgical history and problem list.    Allergies: Lisinopril; Morphine and related; Baclofen; Codeine; Morphine; and Penicillins    Review of Systems   Constitutional: Positive for chills and fatigue.   HENT: Positive for congestion and hearing loss.    Eyes: Negative.    Respiratory: Positive for cough.    Cardiovascular: Negative.    Gastrointestinal: Negative.    Endocrine: Negative.    Genitourinary: Negative.    Musculoskeletal: Negative.    Skin: Negative.    Allergic/Immunologic: Negative.    Neurological: Negative.    Hematological: Negative.    Psychiatric/Behavioral: Negative.    All other systems reviewed and are negative.      Objective     Wt Readings from Last 3 Encounters:   11/07/17 96.6 kg (213 lb)   11/03/17 96.9 kg (213 lb 9.6 oz)   10/05/17 96.2 kg (212 lb)     Temp Readings from Last 3 Encounters:   11/06/17 97.9 °F (36.6 °C) (Oral)   07/07/17 98 °F (36.7 °C) (Oral)   06/20/17 97.6 °F (36.4 °C) (Oral)     BP Readings from Last 3 Encounters:   12/18/17 130/84   12/05/17 98/60   11/14/17 122/80     Pulse Readings from Last 3 Encounters:   12/18/17 73    12/05/17 72   11/14/17 70     There is no height or weight on file to calculate BMI.  SpO2 Readings from Last 3 Encounters:   12/18/17 92%   12/05/17 94%   11/14/17 96%       Physical Exam   Constitutional: She is oriented to person, place, and time. She appears well-developed and well-nourished.   HENT:   Head: Normocephalic and atraumatic.   Right Ear: External ear normal.   Left Ear: External ear normal.   Bilateral cerumen impaction.  After ears cleaned out pt had Bilateral serous effusion without erythema   Eyes: Conjunctivae and lids are normal.   Neck: Normal range of motion. Neck supple.   Cardiovascular: Normal rate and regular rhythm.    Pulmonary/Chest: Effort normal and breath sounds normal. No respiratory distress. She has no wheezes.   Musculoskeletal: Normal range of motion. She exhibits no edema.   Lymphadenopathy:     She has cervical adenopathy.   Neurological: She is alert and oriented to person, place, and time. No cranial nerve deficit.   Skin: Skin is warm and dry. No rash noted.   Psychiatric: She has a normal mood and affect. Her behavior is normal. Thought content normal.   Nursing note and vitals reviewed.        Results for orders placed or performed in visit on 12/05/17   Comprehensive metabolic panel   Result Value Ref Range    Glucose 29 (C) 65 - 99 mg/dL    BUN 35 (H) 8 - 23 mg/dL    Creatinine 1.73 (H) 0.57 - 1.00 mg/dL    eGFR Non African Am 29 (L) >60 mL/min/1.73    eGFR African Am 35 (L) >60 mL/min/1.73    BUN/Creatinine Ratio 20.2 7.0 - 25.0    Sodium 142 136 - 145 mmol/L    Potassium 4.9 3.5 - 5.2 mmol/L    Chloride 102 98 - 107 mmol/L    Total CO2 31.6 (H) 22.0 - 29.0 mmol/L    Calcium 8.7 (L) 8.8 - 10.5 mg/dL    Total Protein 6.4 6.0 - 8.5 g/dL    Albumin 3.80 3.50 - 5.20 g/dL    Globulin 2.6 gm/dL    A/G Ratio 1.5 g/dL    Total Bilirubin 0.4 0.2 - 1.2 mg/dL    Alkaline Phosphatase 149 (H) 40 - 129 U/L    AST (SGOT) 8 5 - 32 U/L    ALT (SGPT) 9 5 - 33 U/L   Lipid Panel With  LDL/HDL Ratio   Result Value Ref Range    Total Cholesterol 116 0 - 200 mg/dL    Triglycerides 53 0 - 150 mg/dL    HDL Cholesterol 54 40 - 60 mg/dL    VLDL Cholesterol 10.6 7 - 27 mg/dL    LDL Cholesterol  51 0 - 100 mg/dL    LDL/HDL Ratio 0.95    Hemoglobin A1c   Result Value Ref Range    Hemoglobin A1C 8.30 (H) 4.80 - 5.60 %   TSH   Result Value Ref Range    TSH 2.670 0.270 - 4.200 mIU/mL   T4, free   Result Value Ref Range    Free T4 1.50 0.93 - 1.70 ng/dL   Vitamin B12   Result Value Ref Range    Vitamin B-12 350 232 - 1245 pg/mL   Protime-INR   Result Value Ref Range    INR 1.05 0.90 - 1.10    Protime 13.7 12.1 - 15.0 Seconds   CBC w AUTO Differential   Result Value Ref Range    WBC 9.93 4.80 - 10.80 10*3/mm3    RBC 3.12 (L) 4.20 - 5.40 10*6/mm3    Hemoglobin 10.1 (L) 12.0 - 16.0 g/dL    Hematocrit 31.6 (L) 37.0 - 47.0 %    .3 (H) 81.0 - 99.0 fL    MCH 32.4 (H) 27.0 - 31.0 pg    MCHC 32.0 31.0 - 37.0 g/dL    RDW 17.6 (H) 11.5 - 14.5 %    Platelets 346 140 - 500 10*3/mm3    Neutrophil Rel % 54.3 45.0 - 70.0 %    Lymphocyte Rel % 35.9 20.0 - 45.0 %    Monocyte Rel % 4.4 3.0 - 8.0 %    Eosinophil Rel % 4.0 0.0 - 4.0 %    Basophil Rel % 1.1 0.0 - 2.0 %    Neutrophils Absolute 5.39 1.50 - 8.30 10*3/mm3    Lymphocytes Absolute 3.56 0.60 - 4.80 10*3/mm3    Monocytes Absolute 0.44 0.00 - 1.00 10*3/mm3    Eosinophils Absolute 0.40 (H) 0.10 - 0.30 10*3/mm3    Basophils Absolute 0.11 0.00 - 0.20 10*3/mm3    Immature Granulocyte Rel % 0.3 0.0 - 0.5 %    Immature Grans Absolute 0.03 0.00 - 0.03 10*3/mm3    nRBC 0.0 0.0 - 0.0 /100 WBC     Ear Cerumen Removal Instrumentation  Date/Time: 12/18/2017 1:39 PM  Performed by: MARIJA REA  Authorized by: MARIJA REA   Consent: Verbal consent obtained.  Consent given by: patient  Patient understanding: patient states understanding of the procedure being performed  Patient consent: the patient's understanding of the procedure matches consent  given  Patient identity confirmed: verbally with patient  Location details: right ear and left ear  Procedure type: curette  Patient tolerance: Patient tolerated the procedure well with no immediate complications  Comments: Large chunks of ear wax removed from both ears.             Assessment/Plan   Joya was seen today for follow-up and hypertension.    Diagnoses and all orders for this visit:    Sinusitis, unspecified chronicity, unspecified location  -     fluticasone (FLONASE) 50 MCG/ACT nasal spray; 2 sprays into each nostril Daily for 30 days. Administer 2 sprays in each nostril for each dose.  -     cefuroxime (CEFTIN) 250 MG tablet; Take 1 tablet by mouth 2 (Two) Times a Day.    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    Other orders  -     Ear Cerumen Removal          Outpatient Medications Prior to Visit   Medication Sig Dispense Refill   • ACCU-CHEK FASTCLIX LANCETS misc TEST 3-4 TIMES DAILY AS DIRECTED 120 each 10   • acetaminophen (TYLENOL) 325 MG tablet Take 2 tablets by mouth Every 6 (Six) Hours As Needed for Mild Pain . OTC product 40 tablet 0   • Alcohol Swabs (B-D SINGLE USE SWABS REGULAR) pads      • apixaban (ELIQUIS) 2.5 MG tablet tablet Take 1 tablet by mouth 2 (Two) Times a Day. 60 tablet 2   • aspirin 325 MG tablet Take 325 mg by mouth Daily.     • atorvastatin (LIPITOR) 10 MG tablet Take 10 mg by mouth Every Night.     • Blood Glucose Monitoring Suppl (ACCU-CHEK KENNETH SMARTVIEW) W/DEVICE kit USE AS DIRECTED     • buPROPion SR (WELLBUTRIN SR) 150 MG 12 hr tablet Take 150 mg by mouth Every Night.     • Calcium Carb-Cholecalciferol (CALCIUM PLUS VITAMIN D3 PO) Take 1 tablet by mouth Every Morning.     • Cyanocobalamin (VITAMELTS ENERGY VITAMIN B-12) 1500 MCG tablet dispersible Take 1 tablet by mouth Every Morning.     • cyclobenzaprine (FLEXERIL) 10 MG tablet Take 10 mg by mouth 3 (Three) Times a Day As Needed for Muscle Spasms.     • DULoxetine  "(CYMBALTA) 60 MG capsule Take 60 mg by mouth Every Morning.     • furosemide (LASIX) 20 MG tablet Take 20 mg by mouth Every Morning.     • gabapentin (NEURONTIN) 400 MG capsule Take 400 mg by mouth 3 (Three) Times a Day. AND 2 CAPSULES AT BEDTIME     • HYDROcodone-acetaminophen (NORCO) 5-325 MG per tablet Take 1 tablet by mouth Every 12 (Twelve) Hours As Needed for Moderate Pain . 60 tablet 0   • insulin aspart (NOVOLOG FLEXPEN) 100 UNIT/ML solution pen-injector sc pen 20 units sq before breakfast then 25 units sq before lunch and dinner (Patient taking differently: 3 (Three) Times a Day With Meals. 20 units sq before breakfast then 25 units sq before lunch and dinner) 15 mL 6   • Insulin Pen Needle (ULTICARE MINI PEN NEEDLES) 31G X 6 MM misc To check blood sugar once daily 100 each 3   • levothyroxine (SYNTHROID, LEVOTHROID) 75 MCG tablet Take 75 mcg by mouth Every Morning.     • melatonin 5 MG tablet tablet Take 5 mg by mouth At Night As Needed.     • metoprolol tartrate (LOPRESSOR) 12.5 MG half tablet Take 12.5 mg by mouth 2 (Two) Times a Day.     • Needle, Disp, (BD DISP NEEDLES) 30G X 1/2\" misc To be used 3 times daily with Novolog Flexpen. 100 each 5   • O2 (OXYGEN) Inhale 2 L/min Every Night.     • omeprazole (priLOSEC) 40 MG capsule Take 40 mg by mouth Every Morning.     • potassium chloride (K-DUR) 10 MEQ CR tablet Take 10 mEq by mouth Every Morning.     • thiamine (VITAMIN B-1) 100 MG tablet Take 100 mg by mouth Daily.     • TRESIBA FLEXTOUCH 200 UNIT/ML solution pen-injector Inject 80 Units under the skin Every Night.     • ULTICARE MICRO PEN NEEDLES 32G X 4 MM misc      • ULTICARE PEN NEEDLES 29G X 12MM misc        No facility-administered medications prior to visit.      New Medications Ordered This Visit   Medications   • fluticasone (FLONASE) 50 MCG/ACT nasal spray     Si sprays into each nostril Daily for 30 days. Administer 2 sprays in each nostril for each dose.     Dispense:  1 bottle     " Refill:  2   • cefuroxime (CEFTIN) 250 MG tablet     Sig: Take 1 tablet by mouth 2 (Two) Times a Day.     Dispense:  20 tablet     Refill:  0     [unfilled]  There are no discontinued medications.      Return if symptoms worsen or fail to improve.

## 2017-12-26 ENCOUNTER — OFFICE VISIT (OUTPATIENT)
Dept: INTERNAL MEDICINE | Facility: CLINIC | Age: 77
End: 2017-12-26

## 2017-12-26 VITALS
RESPIRATION RATE: 18 BRPM | HEART RATE: 72 BPM | DIASTOLIC BLOOD PRESSURE: 84 MMHG | SYSTOLIC BLOOD PRESSURE: 122 MMHG | OXYGEN SATURATION: 96 %

## 2017-12-26 DIAGNOSIS — M10.9 ACUTE GOUT OF RIGHT FOOT, UNSPECIFIED CAUSE: Primary | ICD-10-CM

## 2017-12-26 DIAGNOSIS — K22.9 ESOPHAGEAL ABNORMALITY: ICD-10-CM

## 2017-12-26 DIAGNOSIS — R23.0 PERIORAL CYANOSIS: ICD-10-CM

## 2017-12-26 DIAGNOSIS — R05.3 CHRONIC COUGH: ICD-10-CM

## 2017-12-26 LAB
BUN SERPL-MCNC: 25 MG/DL (ref 8–23)
BUN/CREAT SERPL: 16.4 (ref 7–25)
CALCIUM SERPL-MCNC: 8.8 MG/DL (ref 8.8–10.5)
CHLORIDE SERPL-SCNC: 103 MMOL/L (ref 98–107)
CO2 SERPL-SCNC: 30.9 MMOL/L (ref 22–29)
CREAT SERPL-MCNC: 1.52 MG/DL (ref 0.57–1)
GLUCOSE SERPL-MCNC: 101 MG/DL (ref 65–99)
POTASSIUM SERPL-SCNC: 5.6 MMOL/L (ref 3.5–5.2)
SODIUM SERPL-SCNC: 142 MMOL/L (ref 136–145)
URATE SERPL-MCNC: 7.3 MG/DL (ref 3.4–7)

## 2017-12-26 PROCEDURE — 99213 OFFICE O/P EST LOW 20 MIN: CPT | Performed by: INTERNAL MEDICINE

## 2017-12-26 RX ORDER — COLCHICINE 0.6 MG/1
TABLET ORAL
Qty: 3 TABLET | Refills: 0 | Status: ON HOLD | OUTPATIENT
Start: 2017-12-26 | End: 2018-10-25

## 2017-12-26 NOTE — PROGRESS NOTES
Subjective     Joya Singh is a 77 y.o. female, who presents with a chief complaint of   Chief Complaint   Patient presents with   • Cellulitis       HPI   The pt has pain in her right ankle.  She is on ceftin currently for another infection.  She is worried about cellulitis.  She has had gout in the past with high uric acid.      The following portions of the patient's history were reviewed and updated as appropriate: allergies, current medications, past family history, past medical history, past social history, past surgical history and problem list.    Allergies: Lisinopril; Morphine and related; Baclofen; Codeine; Morphine; and Penicillins    Review of Systems   Constitutional: Negative.    HENT: Negative.    Eyes: Negative.    Respiratory: Negative.    Cardiovascular: Negative.    Gastrointestinal: Negative.    Endocrine: Negative.    Genitourinary: Negative.    Musculoskeletal:        Right foot pain   Skin: Negative.    Allergic/Immunologic: Negative.    Neurological: Negative.    Hematological: Negative.    Psychiatric/Behavioral: Negative.    All other systems reviewed and are negative.      Objective     Wt Readings from Last 3 Encounters:   11/07/17 96.6 kg (213 lb)   11/03/17 96.9 kg (213 lb 9.6 oz)   10/05/17 96.2 kg (212 lb)     Temp Readings from Last 3 Encounters:   11/06/17 97.9 °F (36.6 °C) (Oral)   07/07/17 98 °F (36.7 °C) (Oral)   06/20/17 97.6 °F (36.4 °C) (Oral)     BP Readings from Last 3 Encounters:   12/26/17 122/84   12/18/17 130/84   12/05/17 98/60     Pulse Readings from Last 3 Encounters:   12/26/17 72   12/18/17 73   12/05/17 72     There is no height or weight on file to calculate BMI.  SpO2 Readings from Last 3 Encounters:   12/26/17 96%   12/18/17 92%   12/05/17 94%       Physical Exam   Constitutional: She is oriented to person, place, and time. She appears well-developed and well-nourished. No distress.   HENT:   Head: Normocephalic and atraumatic.   Right Ear: External ear normal.    Left Ear: External ear normal.   Nose: Nose normal.   Mouth/Throat: Oropharynx is clear and moist.   Eyes: Conjunctivae and EOM are normal. Pupils are equal, round, and reactive to light.   Neck: Normal range of motion. Neck supple.   Cardiovascular: Normal rate, regular rhythm, normal heart sounds and intact distal pulses.    Pulmonary/Chest: Effort normal and breath sounds normal. No respiratory distress. She has no wheezes.   Musculoskeletal:   Right foot with ttp around navicular area and lateral ankle.  Minimal erythema.  Mild increased warmth to touch.  Left foot normal.    Neurological: She is alert and oriented to person, place, and time.   Skin: Skin is warm and dry.   Psychiatric: She has a normal mood and affect. Her behavior is normal. Judgment and thought content normal.   Nursing note and vitals reviewed.      Results for orders placed or performed in visit on 12/05/17   Comprehensive metabolic panel   Result Value Ref Range    Glucose 29 (C) 65 - 99 mg/dL    BUN 35 (H) 8 - 23 mg/dL    Creatinine 1.73 (H) 0.57 - 1.00 mg/dL    eGFR Non African Am 29 (L) >60 mL/min/1.73    eGFR African Am 35 (L) >60 mL/min/1.73    BUN/Creatinine Ratio 20.2 7.0 - 25.0    Sodium 142 136 - 145 mmol/L    Potassium 4.9 3.5 - 5.2 mmol/L    Chloride 102 98 - 107 mmol/L    Total CO2 31.6 (H) 22.0 - 29.0 mmol/L    Calcium 8.7 (L) 8.8 - 10.5 mg/dL    Total Protein 6.4 6.0 - 8.5 g/dL    Albumin 3.80 3.50 - 5.20 g/dL    Globulin 2.6 gm/dL    A/G Ratio 1.5 g/dL    Total Bilirubin 0.4 0.2 - 1.2 mg/dL    Alkaline Phosphatase 149 (H) 40 - 129 U/L    AST (SGOT) 8 5 - 32 U/L    ALT (SGPT) 9 5 - 33 U/L   Lipid Panel With LDL/HDL Ratio   Result Value Ref Range    Total Cholesterol 116 0 - 200 mg/dL    Triglycerides 53 0 - 150 mg/dL    HDL Cholesterol 54 40 - 60 mg/dL    VLDL Cholesterol 10.6 7 - 27 mg/dL    LDL Cholesterol  51 0 - 100 mg/dL    LDL/HDL Ratio 0.95    Hemoglobin A1c   Result Value Ref Range    Hemoglobin A1C 8.30 (H) 4.80 -  5.60 %   TSH   Result Value Ref Range    TSH 2.670 0.270 - 4.200 mIU/mL   T4, free   Result Value Ref Range    Free T4 1.50 0.93 - 1.70 ng/dL   Vitamin B12   Result Value Ref Range    Vitamin B-12 350 232 - 1245 pg/mL   Protime-INR   Result Value Ref Range    INR 1.05 0.90 - 1.10    Protime 13.7 12.1 - 15.0 Seconds   CBC w AUTO Differential   Result Value Ref Range    WBC 9.93 4.80 - 10.80 10*3/mm3    RBC 3.12 (L) 4.20 - 5.40 10*6/mm3    Hemoglobin 10.1 (L) 12.0 - 16.0 g/dL    Hematocrit 31.6 (L) 37.0 - 47.0 %    .3 (H) 81.0 - 99.0 fL    MCH 32.4 (H) 27.0 - 31.0 pg    MCHC 32.0 31.0 - 37.0 g/dL    RDW 17.6 (H) 11.5 - 14.5 %    Platelets 346 140 - 500 10*3/mm3    Neutrophil Rel % 54.3 45.0 - 70.0 %    Lymphocyte Rel % 35.9 20.0 - 45.0 %    Monocyte Rel % 4.4 3.0 - 8.0 %    Eosinophil Rel % 4.0 0.0 - 4.0 %    Basophil Rel % 1.1 0.0 - 2.0 %    Neutrophils Absolute 5.39 1.50 - 8.30 10*3/mm3    Lymphocytes Absolute 3.56 0.60 - 4.80 10*3/mm3    Monocytes Absolute 0.44 0.00 - 1.00 10*3/mm3    Eosinophils Absolute 0.40 (H) 0.10 - 0.30 10*3/mm3    Basophils Absolute 0.11 0.00 - 0.20 10*3/mm3    Immature Granulocyte Rel % 0.3 0.0 - 0.5 %    Immature Grans Absolute 0.03 0.00 - 0.03 10*3/mm3    nRBC 0.0 0.0 - 0.0 /100 WBC       Assessment/Plan   Joya was seen today for cellulitis.    Diagnoses and all orders for this visit:    Acute gout of right foot, unspecified cause  -     Basic Metabolic Panel; Future  -     Uric Acid; Future  -     colchicine 0.6 MG tablet; 2 pills po now then 1 pill po 1 hour later for gout      Pt already on antibiotics and foot does not have appearance of cellulitis.  Treat for acute gout attack.    Outpatient Medications Prior to Visit   Medication Sig Dispense Refill   • ACCU-CHEK FASTCLIX LANCETS misc TEST 3-4 TIMES DAILY AS DIRECTED 120 each 10   • acetaminophen (TYLENOL) 325 MG tablet Take 2 tablets by mouth Every 6 (Six) Hours As Needed for Mild Pain . OTC product 40 tablet 0   • Alcohol  Swabs (B-D SINGLE USE SWABS REGULAR) pads      • apixaban (ELIQUIS) 2.5 MG tablet tablet Take 1 tablet by mouth 2 (Two) Times a Day. 60 tablet 2   • aspirin 325 MG tablet Take 325 mg by mouth Daily.     • atorvastatin (LIPITOR) 10 MG tablet Take 10 mg by mouth Every Night.     • Blood Glucose Monitoring Suppl (ACCU-CHEK KENNETH SMARTVIEW) W/DEVICE kit USE AS DIRECTED     • buPROPion SR (WELLBUTRIN SR) 150 MG 12 hr tablet Take 150 mg by mouth Every Night.     • Calcium Carb-Cholecalciferol (CALCIUM PLUS VITAMIN D3 PO) Take 1 tablet by mouth Every Morning.     • cefuroxime (CEFTIN) 250 MG tablet Take 1 tablet by mouth 2 (Two) Times a Day. 20 tablet 0   • Cyanocobalamin (VITAMELTS ENERGY VITAMIN B-12) 1500 MCG tablet dispersible Take 1 tablet by mouth Every Morning.     • cyclobenzaprine (FLEXERIL) 10 MG tablet Take 10 mg by mouth 3 (Three) Times a Day As Needed for Muscle Spasms.     • DULoxetine (CYMBALTA) 60 MG capsule Take 60 mg by mouth Every Morning.     • fluticasone (FLONASE) 50 MCG/ACT nasal spray 2 sprays into each nostril Daily for 30 days. Administer 2 sprays in each nostril for each dose. 1 bottle 2   • furosemide (LASIX) 20 MG tablet Take 20 mg by mouth Every Morning.     • gabapentin (NEURONTIN) 400 MG capsule Take 400 mg by mouth 3 (Three) Times a Day. AND 2 CAPSULES AT BEDTIME     • HYDROcodone-acetaminophen (NORCO) 5-325 MG per tablet Take 1 tablet by mouth Every 12 (Twelve) Hours As Needed for Moderate Pain . 60 tablet 0   • insulin aspart (NOVOLOG FLEXPEN) 100 UNIT/ML solution pen-injector sc pen 20 units sq before breakfast then 25 units sq before lunch and dinner (Patient taking differently: 3 (Three) Times a Day With Meals. 20 units sq before breakfast then 25 units sq before lunch and dinner) 15 mL 6   • Insulin Pen Needle (ULTICARE MINI PEN NEEDLES) 31G X 6 MM misc To check blood sugar once daily 100 each 3   • levothyroxine (SYNTHROID, LEVOTHROID) 75 MCG tablet Take 75 mcg by mouth Every Morning.  "    • melatonin 5 MG tablet tablet Take 5 mg by mouth At Night As Needed.     • metoprolol tartrate (LOPRESSOR) 12.5 MG half tablet Take 12.5 mg by mouth 2 (Two) Times a Day.     • Needle, Disp, (BD DISP NEEDLES) 30G X 1/2\" misc To be used 3 times daily with Novolog Flexpen. 100 each 5   • O2 (OXYGEN) Inhale 2 L/min Every Night.     • omeprazole (priLOSEC) 40 MG capsule Take 40 mg by mouth Every Morning.     • potassium chloride (K-DUR) 10 MEQ CR tablet Take 10 mEq by mouth Every Morning.     • thiamine (VITAMIN B-1) 100 MG tablet Take 100 mg by mouth Daily.     • TRESIBA FLEXTOUCH 200 UNIT/ML solution pen-injector Inject 80 Units under the skin Every Night.     • ULTICARE MICRO PEN NEEDLES 32G X 4 MM misc      • ULTICARE PEN NEEDLES 29G X 12MM misc        No facility-administered medications prior to visit.      New Medications Ordered This Visit   Medications   • colchicine 0.6 MG tablet     Si pills po now then 1 pill po 1 hour later for gout     Dispense:  3 tablet     Refill:  0     [unfilled]  There are no discontinued medications.      Return if symptoms worsen or fail to improve.  "

## 2017-12-29 DIAGNOSIS — E11.43 TYPE 2 DIABETES MELLITUS WITH AUTONOMIC NEUROPATHY (HCC): ICD-10-CM

## 2017-12-29 DIAGNOSIS — E10.9 BRITTLE DIABETES MELLITUS (HCC): ICD-10-CM

## 2017-12-29 RX ORDER — INSULIN DEGLUDEC 200 U/ML
INJECTION, SOLUTION SUBCUTANEOUS
Qty: 9 ML | Refills: 3 | Status: SHIPPED | OUTPATIENT
Start: 2017-12-29 | End: 2018-03-27 | Stop reason: SDUPTHER

## 2018-01-18 RX ORDER — BLOOD-GLUCOSE METER
EACH MISCELLANEOUS
Qty: 1 KIT | Refills: 0 | Status: SHIPPED | OUTPATIENT
Start: 2018-01-18

## 2018-01-23 ENCOUNTER — TRANSCRIBE ORDERS (OUTPATIENT)
Dept: ADMINISTRATIVE | Facility: HOSPITAL | Age: 78
End: 2018-01-23

## 2018-01-23 DIAGNOSIS — R06.00 DYSPNEA, UNSPECIFIED TYPE: ICD-10-CM

## 2018-01-23 DIAGNOSIS — R09.02 HYPOXIA: Primary | ICD-10-CM

## 2018-01-30 DIAGNOSIS — I82.4Y9 DEEP VEIN THROMBOSIS (DVT) OF PROXIMAL LOWER EXTREMITY, UNSPECIFIED CHRONICITY, UNSPECIFIED LATERALITY (HCC): ICD-10-CM

## 2018-01-30 RX ORDER — APIXABAN 2.5 MG/1
TABLET, FILM COATED ORAL
Qty: 60 TABLET | Refills: 2 | Status: SHIPPED | OUTPATIENT
Start: 2018-01-30 | End: 2018-07-09 | Stop reason: SDUPTHER

## 2018-01-31 ENCOUNTER — HOSPITAL ENCOUNTER (OUTPATIENT)
Dept: PULMONOLOGY | Facility: HOSPITAL | Age: 78
Discharge: HOME | End: 2018-01-31
Attending: INTERNAL MEDICINE | Admitting: INTERNAL MEDICINE

## 2018-01-31 DIAGNOSIS — R06.00 DYSPNEA, UNSPECIFIED TYPE: ICD-10-CM

## 2018-01-31 DIAGNOSIS — R09.02 HYPOXIA: ICD-10-CM

## 2018-01-31 PROCEDURE — 94729 DIFFUSING CAPACITY: CPT

## 2018-01-31 PROCEDURE — A9270 NON-COVERED ITEM OR SERVICE: HCPCS | Performed by: INTERNAL MEDICINE

## 2018-01-31 PROCEDURE — 63710000001 ALBUTEROL PER 1 MG: Performed by: INTERNAL MEDICINE

## 2018-01-31 PROCEDURE — 94726 PLETHYSMOGRAPHY LUNG VOLUMES: CPT

## 2018-01-31 PROCEDURE — 94060 EVALUATION OF WHEEZING: CPT

## 2018-01-31 RX ORDER — ALBUTEROL SULFATE 2.5 MG/3ML
2.5 SOLUTION RESPIRATORY (INHALATION) ONCE
Status: COMPLETED | OUTPATIENT
Start: 2018-01-31 | End: 2018-01-31

## 2018-01-31 RX ADMIN — ALBUTEROL SULFATE 2.5 MG: 2.5 SOLUTION RESPIRATORY (INHALATION) at 08:44

## 2018-02-06 ENCOUNTER — OFFICE VISIT (OUTPATIENT)
Dept: ORTHOPEDIC SURGERY | Facility: CLINIC | Age: 78
End: 2018-02-06

## 2018-02-06 DIAGNOSIS — M17.12 PRIMARY OSTEOARTHRITIS OF LEFT KNEE: ICD-10-CM

## 2018-02-06 DIAGNOSIS — S83.232D COMPLEX TEAR OF MEDIAL MENISCUS OF LEFT KNEE AS CURRENT INJURY, SUBSEQUENT ENCOUNTER: ICD-10-CM

## 2018-02-06 DIAGNOSIS — M84.469K INSUFFICIENCY FRACTURE OF TIBIA WITH NONUNION, SUBSEQUENT ENCOUNTER: Primary | ICD-10-CM

## 2018-02-06 PROCEDURE — 99213 OFFICE O/P EST LOW 20 MIN: CPT | Performed by: ORTHOPAEDIC SURGERY

## 2018-02-06 RX ORDER — METHYLPREDNISOLONE 4 MG/1
TABLET ORAL
COMMUNITY
Start: 2017-12-27 | End: 2018-02-21

## 2018-02-06 RX ORDER — BLOOD SUGAR DIAGNOSTIC
STRIP MISCELLANEOUS
COMMUNITY
Start: 2017-12-28 | End: 2018-04-16 | Stop reason: SDUPTHER

## 2018-02-06 NOTE — PROGRESS NOTES
Subjective:     Patient ID: Joya Singh is a 77 y.o. female.    Chief Complaint:  Follow-up left knee pain, tibia insufficiency fracture, complex medial meniscus tear, DJD  History of Present Illness  Joya Singh returns to clinic today for evaluation of left knee, states that most recent injection provided minimal to no relief to her knee and only for a very short period of time.  She has been seen and evaluated by her pulmonologist, per her report they have optimized her a pulmonary standpoint but she still has moderate risk given her underlying lung function.  Patient localizes pain to the left knee still primarily along the medial joint line as well as anteriorly over the patella, exacerbated with any weightbearing for longer than a couple minutes, uses walker and cane to try to help mobilize.  Rates current level of pain as a 9 out of 10 in aching in nature at its worst.  Mild intermittent swelling that is wax and wane.  She did get relief for approximately 3 months from prior viscous supplement injection series.  Denies any significant radiation of pain below level the mid leg on the left, denies any associated numbness or tingling left lower extremity.     Social History     Occupational History   • Not on file.     Social History Main Topics   • Smoking status: Never Smoker   • Smokeless tobacco: Never Used      Comment: CAFFEINE USE: NONE   • Alcohol use No   • Drug use: No   • Sexual activity: Defer      Comment: EXERCISE - RARELY      Past Medical History:   Diagnosis Date   • Allergic rhinitis    • Anxiety    • Arthritis    • Bell's palsy    • CKD (chronic kidney disease) stage 3, GFR 30-59 ml/min    • Depression    • Diabetes mellitus     LAST A1C 6   • Diabetic gastroparesis 2/19/2016   • GERD (gastroesophageal reflux disease)    • H/O blood clots     LEFT LEG 7 OR 8 YEARS AGO   • Hyperlipidemia    • Hypertension    • Hypothyroidism    • Normal coronary arteries     by cath 2013   • AARON (obstructive sleep  apnea)     DOESNT WEAR REGULARLY   • PONV (postoperative nausea and vomiting)    • Skin cancer    • Stroke    • TIA (transient ischemic attack)     LAST TIA JULY 2017     Past Surgical History:   Procedure Laterality Date   • BACK SURGERY      HARDWARE   • CHOLECYSTECTOMY      OPEN   • COLONOSCOPY  2011    due for repeat in 2021   • HYSTERECTOMY      PARTIAL    • NECK SURGERY     • SPINE SURGERY     • UPPER GASTROINTESTINAL ENDOSCOPY  2014    gastritis.  done by dr. hastings       Family History   Problem Relation Age of Onset   • Lupus Mother    • Heart failure Mother 59   • Heart disease Other    • Hypertension Other    • Heart attack Father          Review of Systems   Constitutional: Negative for chills, diaphoresis, fever and unexpected weight change.   HENT: Negative for hearing loss, nosebleeds, sore throat and tinnitus.    Eyes: Negative for pain and visual disturbance.   Respiratory: Negative for cough, shortness of breath and wheezing.    Cardiovascular: Negative for chest pain and palpitations.   Gastrointestinal: Negative for abdominal pain, diarrhea, nausea and vomiting.   Endocrine: Negative for cold intolerance, heat intolerance and polydipsia.   Genitourinary: Negative for difficulty urinating, dysuria and hematuria.   Musculoskeletal: Positive for arthralgias. Negative for joint swelling and myalgias.   Skin: Negative for rash and wound.   Allergic/Immunologic: Negative for environmental allergies.   Neurological: Negative for dizziness, syncope and numbness.   Hematological: Does not bruise/bleed easily.   Psychiatric/Behavioral: Negative for dysphoric mood and sleep disturbance. The patient is not nervous/anxious.    All other systems reviewed and are negative.          Objective:  There were no vitals filed for this visit.  There were no vitals filed for this visit.  There is no height or weight on file to calculate BMI.  General: No acute distress.  Resp: normal respiratory effort  Skin: no rashes  or wounds; normal turgor  Psych: mood and affect appropriate; recent and remote memory intact         Ortho Exam    Left knee-active and passive range of motion 5-120°, 4+ out of 5 strength on flexion and extension.  Maximal tenderness palpation along medial joint line as well as medial proximal tibia, moderate tenderness along medial lateral patellar facet, positive active patellar compression test.  Moderate effusion noted, stable to varus and valgus stress at 5 and 30°.  No left hip pain on logroll or Stinchfield exam negative straight leg raise left lower extremity.  Brisk cap refill all digits, positive sensation light touch left foot.    Imaging:    Assessment:       1. Insufficiency fracture of tibia with nonunion, subsequent encounter    2. Primary osteoarthritis of left knee    3. Complex tear of medial meniscus of left knee as current injury, subsequent encounter          Plan:  KENDRICK query complete.          Discussed treatment options at length with patient at today's visit. Reviewed options again including but not limited to proceeding with arthroscopy, subchondroplasty, meniscal and cartilage surgery as indicated.  At this point time she and her family would like to hold off on surgery given her baseline increased risk with her pulmonary issues.  We discussed other options including injections, she would like to proceed with viscous supplement injection series at this point in time.  Request is been placed for insurance approval.    Joya Singh was in agreement with plan and had all questions answered.     Orders:  Orders Placed This Encounter   Procedures   • Visco Treatment       Medications:  No orders of the defined types were placed in this encounter.      Followup:  Return for viscosupplement injections.    Joya was seen today for follow-up and pain.    Diagnoses and all orders for this visit:    Insufficiency fracture of tibia with nonunion, subsequent encounter  -     Visco Treatment;  Future    Primary osteoarthritis of left knee  -     Visco Treatment; Future    Complex tear of medial meniscus of left knee as current injury, subsequent encounter  -     Visco Treatment; Future        Dictated utilizing Dragon dictation

## 2018-02-21 ENCOUNTER — OFFICE VISIT (OUTPATIENT)
Dept: INTERNAL MEDICINE | Facility: CLINIC | Age: 78
End: 2018-02-21

## 2018-02-21 VITALS
HEIGHT: 62 IN | TEMPERATURE: 98.6 F | RESPIRATION RATE: 16 BRPM | WEIGHT: 212 LBS | OXYGEN SATURATION: 95 % | DIASTOLIC BLOOD PRESSURE: 82 MMHG | SYSTOLIC BLOOD PRESSURE: 116 MMHG | HEART RATE: 86 BPM | BODY MASS INDEX: 39.01 KG/M2

## 2018-02-21 DIAGNOSIS — G47.33 OBSTRUCTIVE SLEEP APNEA SYNDROME: ICD-10-CM

## 2018-02-21 DIAGNOSIS — Z79.4 TYPE 2 DIABETES MELLITUS WITH DIABETIC AUTONOMIC NEUROPATHY, WITH LONG-TERM CURRENT USE OF INSULIN (HCC): ICD-10-CM

## 2018-02-21 DIAGNOSIS — E11.43 TYPE 2 DIABETES MELLITUS WITH DIABETIC AUTONOMIC NEUROPATHY, WITH LONG-TERM CURRENT USE OF INSULIN (HCC): ICD-10-CM

## 2018-02-21 DIAGNOSIS — E03.9 ACQUIRED HYPOTHYROIDISM: ICD-10-CM

## 2018-02-21 DIAGNOSIS — E78.2 MIXED HYPERLIPIDEMIA: ICD-10-CM

## 2018-02-21 DIAGNOSIS — M79.671 RIGHT FOOT PAIN: Primary | ICD-10-CM

## 2018-02-21 DIAGNOSIS — I82.499 DEEP VEIN THROMBOSIS (DVT) OF OTHER VEIN OF LOWER EXTREMITY, UNSPECIFIED CHRONICITY, UNSPECIFIED LATERALITY (HCC): ICD-10-CM

## 2018-02-21 DIAGNOSIS — I10 ESSENTIAL HYPERTENSION: ICD-10-CM

## 2018-02-21 LAB
ALBUMIN SERPL-MCNC: 4.2 G/DL (ref 3.5–5.2)
ALBUMIN/GLOB SERPL: 1.5 G/DL
ALP SERPL-CCNC: 134 U/L (ref 40–129)
ALT SERPL-CCNC: 6 U/L (ref 5–33)
AST SERPL-CCNC: 10 U/L (ref 5–32)
BASOPHILS # BLD AUTO: 0.08 10*3/MM3 (ref 0–0.2)
BASOPHILS NFR BLD AUTO: 1.1 % (ref 0–2)
BILIRUB SERPL-MCNC: 0.5 MG/DL (ref 0.2–1.2)
BUN SERPL-MCNC: 22 MG/DL (ref 8–23)
BUN/CREAT SERPL: 12.1 (ref 7–25)
CALCIUM SERPL-MCNC: 9.3 MG/DL (ref 8.8–10.5)
CHLORIDE SERPL-SCNC: 104 MMOL/L (ref 98–107)
CHOLEST SERPL-MCNC: 100 MG/DL (ref 0–200)
CO2 SERPL-SCNC: 29.4 MMOL/L (ref 22–29)
CREAT SERPL-MCNC: 1.82 MG/DL (ref 0.57–1)
EOSINOPHIL # BLD AUTO: 0.38 10*3/MM3 (ref 0.1–0.3)
EOSINOPHIL NFR BLD AUTO: 5.2 % (ref 0–4)
ERYTHROCYTE [DISTWIDTH] IN BLOOD BY AUTOMATED COUNT: 18.2 % (ref 11.5–14.5)
GFR SERPLBLD CREATININE-BSD FMLA CKD-EPI: 27 ML/MIN/1.73
GFR SERPLBLD CREATININE-BSD FMLA CKD-EPI: 33 ML/MIN/1.73
GLOBULIN SER CALC-MCNC: 2.8 GM/DL
GLUCOSE SERPL-MCNC: 73 MG/DL (ref 65–99)
HBA1C MFR BLD: 7 % (ref 4.8–5.6)
HCT VFR BLD AUTO: 30.6 % (ref 37–47)
HDLC SERPL-MCNC: 42 MG/DL (ref 40–60)
HGB BLD-MCNC: 9.8 G/DL (ref 12–16)
IMM GRANULOCYTES # BLD: 0.05 10*3/MM3 (ref 0–0.03)
IMM GRANULOCYTES NFR BLD: 0.7 % (ref 0–0.5)
LDLC SERPL CALC-MCNC: 37 MG/DL (ref 0–100)
LDLC/HDLC SERPL: 0.89 {RATIO}
LYMPHOCYTES # BLD AUTO: 2.81 10*3/MM3 (ref 0.6–4.8)
LYMPHOCYTES NFR BLD AUTO: 38.5 % (ref 20–45)
MCH RBC QN AUTO: 32.2 PG (ref 27–31)
MCHC RBC AUTO-ENTMCNC: 32 G/DL (ref 31–37)
MCV RBC AUTO: 100.7 FL (ref 81–99)
MONOCYTES # BLD AUTO: 0.35 10*3/MM3 (ref 0–1)
MONOCYTES NFR BLD AUTO: 4.8 % (ref 3–8)
NEUTROPHILS # BLD AUTO: 3.63 10*3/MM3 (ref 1.5–8.3)
NEUTROPHILS NFR BLD AUTO: 49.7 % (ref 45–70)
NRBC BLD AUTO-RTO: 0 /100 WBC (ref 0–0)
PLATELET # BLD AUTO: 365 10*3/MM3 (ref 140–500)
POTASSIUM SERPL-SCNC: 5.3 MMOL/L (ref 3.5–5.2)
PROT SERPL-MCNC: 7 G/DL (ref 6–8.5)
RBC # BLD AUTO: 3.04 10*6/MM3 (ref 4.2–5.4)
SODIUM SERPL-SCNC: 144 MMOL/L (ref 136–145)
T4 FREE SERPL-MCNC: 1.3 NG/DL (ref 0.93–1.7)
TRIGL SERPL-MCNC: 104 MG/DL (ref 0–150)
TSH SERPL DL<=0.005 MIU/L-ACNC: 4.17 MIU/ML (ref 0.27–4.2)
VLDLC SERPL CALC-MCNC: 20.8 MG/DL (ref 7–27)
WBC # BLD AUTO: 7.3 10*3/MM3 (ref 4.8–10.8)

## 2018-02-21 PROCEDURE — 99214 OFFICE O/P EST MOD 30 MIN: CPT | Performed by: INTERNAL MEDICINE

## 2018-02-21 NOTE — PROGRESS NOTES
Subjective   Joya Singh is a 77 y.o. female.     History of Present Illness     The following portions of the patient's history were reviewed and updated as appropriate: allergies, current medications, past family history, past medical history, past social history, past surgical history and problem list.     Joya Singh is a 77 y.o. female who presents for follow-up:     DM:    HLD:    GERD:    Hypothyroidism:    Review of Systems    Objective   Physical Exam   Constitutional: She is oriented to person, place, and time. She appears well-developed and well-nourished. No distress.   HENT:   Head: Normocephalic and atraumatic.   Right Ear: External ear normal.   Left Ear: External ear normal.   Nose: Nose normal.   Mouth/Throat: Oropharynx is clear and moist. No oropharyngeal exudate.   Eyes: Conjunctivae and EOM are normal. Pupils are equal, round, and reactive to light. Right eye exhibits no discharge. Left eye exhibits no discharge. No scleral icterus.   Neck: Normal range of motion. Neck supple. No thyromegaly present.   Cardiovascular: Normal rate, regular rhythm and normal heart sounds.  Exam reveals no gallop and no friction rub.    No murmur heard.  Pulmonary/Chest: Effort normal and breath sounds normal. No respiratory distress. She has no wheezes. She has no rales. She exhibits no tenderness.   Abdominal: Soft. Bowel sounds are normal. She exhibits no distension and no mass. There is no tenderness. There is no rebound and no guarding. No hernia.   Musculoskeletal: Normal range of motion. She exhibits no tenderness.   Lymphadenopathy:     She has no cervical adenopathy.   Neurological: She is alert and oriented to person, place, and time. She exhibits normal muscle tone.   Skin: Skin is warm and dry. No rash noted. She is not diaphoretic.   Psychiatric: She has a normal mood and affect. Her behavior is normal.   Nursing note and vitals reviewed.      Assessment/Plan   {Assess/PlanSmartLinks:66981}  Joya Singh is a  77 y.o. female who presents for follow-up of the following:    DM:  - Con't to check blood glc as directed  - Con't    HLD:  - Con't lipitor     GERD:  - Con't omeprazole     Hypothyroidism:  - Con't levothyroxine

## 2018-02-21 NOTE — PROGRESS NOTES
Subjective     Joya Singh is a 77 y.o. female, who presents with a chief complaint of   Chief Complaint   Patient presents with   • Follow-up   • Foot Pain     R foot pain, X2-3wks ago, she had some spots on her foot that came up, pain associated with it as well.        HPI   The pt is here for follow up    She had gout in her right foot and that is now better.  She now has bruising in the foot.  She is on aspirin and xarelto.  She denies trauma or injury to the foot.  She says her foot stands if she stand on it.      DM - she say her glucoses have been much better and even low in some mornings.  She brought her log and am glucoses have been 60-80 range.  She is taking 80 units of tresiba and has not been using her novolog.   She had a little orange juice this am bc of a low glucose.     htn - well controlled.  No ha/dizziness.     Hypothyroidism - no hair/skin changes.     cindi - she says she is wearing her cpap some but she is waiting for [facility] to put oxygen through the machine.     The following portions of the patient's history were reviewed and updated as appropriate: allergies, current medications, past family history, past medical history, past social history, past surgical history and problem list.    Allergies: Lisinopril; Morphine and related; Baclofen; Codeine; Morphine; and Penicillins    Review of Systems   Constitutional: Negative.    HENT: Negative.    Eyes: Negative.    Respiratory: Negative.    Cardiovascular: Negative.    Gastrointestinal: Negative.    Endocrine: Negative.    Genitourinary: Negative.    Musculoskeletal:        Foot pain   Skin: Negative.    Allergic/Immunologic: Negative.    Neurological: Negative.    Hematological: Negative.    Psychiatric/Behavioral: Negative.    All other systems reviewed and are negative.      Objective     Wt Readings from Last 3 Encounters:   02/21/18 96.2 kg (212 lb)   11/07/17 96.6 kg (213 lb)   11/03/17 96.9 kg (213 lb 9.6 oz)     Temp Readings from Last  3 Encounters:   02/21/18 98.6 °F (37 °C)   11/06/17 97.9 °F (36.6 °C) (Oral)   07/07/17 98 °F (36.7 °C) (Oral)     BP Readings from Last 3 Encounters:   02/21/18 116/82   12/26/17 122/84   12/18/17 130/84     Pulse Readings from Last 3 Encounters:   02/21/18 86   12/26/17 72   12/18/17 73     Body mass index is 38.77 kg/(m^2).  SpO2 Readings from Last 3 Encounters:   02/21/18 95%   12/26/17 96%   12/18/17 92%       Physical Exam   Constitutional: She is oriented to person, place, and time. She appears well-developed and well-nourished. No distress.   HENT:   Head: Normocephalic and atraumatic.   Right Ear: External ear normal.   Left Ear: External ear normal.   Nose: Nose normal.   Mouth/Throat: Oropharynx is clear and moist.   Eyes: Conjunctivae and EOM are normal. Pupils are equal, round, and reactive to light.   Neck: Normal range of motion. Neck supple.   Cardiovascular: Normal rate, regular rhythm, normal heart sounds and intact distal pulses.    Pulmonary/Chest: Effort normal and breath sounds normal. No respiratory distress. She has no wheezes.   Musculoskeletal: Normal range of motion.   Walks with cane  Bruising on top of right foot. Nl rom.  No swelling.     Neurological: She is alert and oriented to person, place, and time.   Skin: Skin is warm and dry.   Psychiatric: She has a normal mood and affect. Her behavior is normal. Judgment and thought content normal.   Nursing note and vitals reviewed.      Results for orders placed or performed in visit on 12/26/17   Basic Metabolic Panel   Result Value Ref Range    Glucose 101 (H) 65 - 99 mg/dL    BUN 25 (H) 8 - 23 mg/dL    Creatinine 1.52 (H) 0.57 - 1.00 mg/dL    eGFR Non African Am 33 (L) >60 mL/min/1.73    eGFR African Am 40 (L) >60 mL/min/1.73    BUN/Creatinine Ratio 16.4 7.0 - 25.0    Sodium 142 136 - 145 mmol/L    Potassium 5.6 (H) 3.5 - 5.2 mmol/L    Chloride 103 98 - 107 mmol/L    Total CO2 30.9 (H) 22.0 - 29.0 mmol/L    Calcium 8.8 8.8 - 10.5 mg/dL    Uric Acid   Result Value Ref Range    Uric Acid 7.3 (H) 3.4 - 7.0 mg/dL       Assessment/Plan   Joya was seen today for follow-up and foot pain.    Diagnoses and all orders for this visit:    Right foot pain    Type 2 diabetes mellitus with diabetic autonomic neuropathy, with long-term current use of insulin  -     Comprehensive Metabolic Panel; Future  -     CBC & Differential; Future  -     T4, Free; Future  -     TSH; Future  -     Microalbumin / Creatinine Urine Ratio - Urine, Clean Catch  -     Lipid Panel With LDL / HDL Ratio; Future  -     Hemoglobin A1c; Future    Essential hypertension  -     Comprehensive Metabolic Panel; Future  -     CBC & Differential; Future  -     Lipid Panel With LDL / HDL Ratio; Future    Obstructive sleep apnea syndrome    Mixed hyperlipidemia  -     Comprehensive Metabolic Panel; Future  -     Lipid Panel With LDL / HDL Ratio; Future    Deep vein thrombosis (DVT) of other vein of lower extremity, unspecified chronicity, unspecified laterality    Acquired hypothyroidism  -     T4, Free; Future  -     TSH; Future        Outpatient Medications Prior to Visit   Medication Sig Dispense Refill   • ACCU-CHEK FASTCLIX LANCETS misc TEST 3-4 TIMES DAILY AS DIRECTED 120 each 10   • ACCU-CHEK SMARTVIEW test strip      • acetaminophen (TYLENOL) 325 MG tablet Take 2 tablets by mouth Every 6 (Six) Hours As Needed for Mild Pain . OTC product 40 tablet 0   • Alcohol Swabs (B-D SINGLE USE SWABS REGULAR) pads      • aspirin 325 MG tablet Take 325 mg by mouth Daily.     • atorvastatin (LIPITOR) 10 MG tablet Take 10 mg by mouth Every Night.     • Blood Glucose Monitoring Suppl (ACCU-CHEK KENNETH SMARTVIEW) w/Device kit TEST blood sugar three times daily or as directed 1 kit 0   • buPROPion SR (WELLBUTRIN SR) 150 MG 12 hr tablet Take 150 mg by mouth Every Night.     • Calcium Carb-Cholecalciferol (CALCIUM PLUS VITAMIN D3 PO) Take 1 tablet by mouth Every Morning.     • cefuroxime (CEFTIN) 250 MG tablet  "Take 1 tablet by mouth 2 (Two) Times a Day. 20 tablet 0   • colchicine 0.6 MG tablet 2 pills po now then 1 pill po 1 hour later for gout 3 tablet 0   • Cyanocobalamin (VITAMELTS ENERGY VITAMIN B-12) 1500 MCG tablet dispersible Take 1 tablet by mouth Every Morning.     • cyclobenzaprine (FLEXERIL) 10 MG tablet Take 10 mg by mouth 3 (Three) Times a Day As Needed for Muscle Spasms.     • DULoxetine (CYMBALTA) 60 MG capsule Take 60 mg by mouth Every Morning.     • ELIQUIS 2.5 MG tablet tablet Take one (1) tablet orally (by mouth) twice daily 60 tablet 2   • furosemide (LASIX) 20 MG tablet Take 20 mg by mouth Every Morning.     • gabapentin (NEURONTIN) 400 MG capsule Take 400 mg by mouth 3 (Three) Times a Day. AND 2 CAPSULES AT BEDTIME     • HYDROcodone-acetaminophen (NORCO) 5-325 MG per tablet Take 1 tablet by mouth Every 12 (Twelve) Hours As Needed for Moderate Pain . 60 tablet 0   • insulin aspart (NOVOLOG FLEXPEN) 100 UNIT/ML solution pen-injector sc pen 20 units sq before breakfast then 25 units sq before lunch and dinner (Patient taking differently: 3 (Three) Times a Day With Meals. 20 units sq before breakfast then 25 units sq before lunch and dinner) 15 mL 6   • Insulin Pen Needle (ULTICARE MINI PEN NEEDLES) 31G X 6 MM misc To check blood sugar once daily 100 each 3   • levothyroxine (SYNTHROID, LEVOTHROID) 75 MCG tablet Take 75 mcg by mouth Every Morning.     • melatonin 5 MG tablet tablet Take 5 mg by mouth At Night As Needed.     • metoprolol tartrate (LOPRESSOR) 12.5 MG half tablet Take 12.5 mg by mouth 2 (Two) Times a Day.     • Needle, Disp, (BD DISP NEEDLES) 30G X 1/2\" misc To be used 3 times daily with Novolog Flexpen. 100 each 5   • O2 (OXYGEN) Inhale 2 L/min Every Night.     • omeprazole (priLOSEC) 40 MG capsule Take 40 mg by mouth Every Morning.     • potassium chloride (K-DUR) 10 MEQ CR tablet Take 10 mEq by mouth Every Morning.     • thiamine (VITAMIN B-1) 100 MG tablet Take 100 mg by mouth Daily.   "   • TRESIBA FLEXTOUCH 200 UNIT/ML solution pen-injector Inject 80 Units under the skin Every Night.     • TRESIBA FLEXTOUCH 200 UNIT/ML solution pen-injector Inject eighty (80) UNITS subcutaneously once daily 9 mL 3   • ULTICARE MICRO PEN NEEDLES 32G X 4 MM misc      • ULTICARE PEN NEEDLES 29G X 12MM misc      • MethylPREDNISolone (MEDROL, ANIL,) 4 MG tablet        No facility-administered medications prior to visit.      No orders of the defined types were placed in this encounter.    [unfilled]  Medications Discontinued During This Encounter   Medication Reason   • MethylPREDNISolone (MEDROL, ANIL,) 4 MG tablet *Therapy completed         Return in about 6 weeks (around 4/4/2018) for Recheck.

## 2018-02-22 LAB
ALBUMIN/CREAT UR: 32.2 (ref 0–30)
CREAT UR-MCNC: 104.8 MG/DL
MICROALBUMIN UR-MCNC: 33.7 UG/ML

## 2018-02-23 RX ORDER — GABAPENTIN 400 MG/1
CAPSULE ORAL
Qty: 120 CAPSULE | Refills: 1 | OUTPATIENT
Start: 2018-02-23 | End: 2018-05-03 | Stop reason: SDUPTHER

## 2018-02-26 ENCOUNTER — TELEPHONE (OUTPATIENT)
Dept: INTERNAL MEDICINE | Facility: CLINIC | Age: 78
End: 2018-02-26

## 2018-02-26 NOTE — TELEPHONE ENCOUNTER
Patient has been advised and voiced understanding.      ----- Message from Chari Mercado MD sent at 2/23/2018  1:22 PM EST -----  Call pt about labs.a1c much better at 7.0.  Keep up the good work!!!

## 2018-03-05 ENCOUNTER — TELEPHONE (OUTPATIENT)
Dept: INTERNAL MEDICINE | Facility: CLINIC | Age: 78
End: 2018-03-05

## 2018-03-05 NOTE — TELEPHONE ENCOUNTER
Returned phone call, patient notified about Microalbumin.  ----- Message from Gaviota Bowles MA sent at 3/5/2018 11:47 AM EST -----  Regarding: FW: UA RESULTS      ----- Message -----     From: Heydi Jackson     Sent: 3/5/2018  11:24 AM       To: Gaviota Bowles MA  Subject: UA RESULTS                                       [place]    Patient requesting Johnna to give her a call back with UA results please    [phone]

## 2018-03-06 ENCOUNTER — CLINICAL SUPPORT (OUTPATIENT)
Dept: ORTHOPEDIC SURGERY | Facility: CLINIC | Age: 78
End: 2018-03-06

## 2018-03-06 DIAGNOSIS — S83.232D COMPLEX TEAR OF MEDIAL MENISCUS OF LEFT KNEE AS CURRENT INJURY, SUBSEQUENT ENCOUNTER: ICD-10-CM

## 2018-03-06 DIAGNOSIS — M17.12 PRIMARY OSTEOARTHRITIS OF LEFT KNEE: ICD-10-CM

## 2018-03-06 DIAGNOSIS — M84.469K INSUFFICIENCY FRACTURE OF TIBIA WITH NONUNION, SUBSEQUENT ENCOUNTER: ICD-10-CM

## 2018-03-06 PROCEDURE — 20610 DRAIN/INJ JOINT/BURSA W/O US: CPT | Performed by: ORTHOPAEDIC SURGERY

## 2018-03-06 NOTE — PROGRESS NOTES
Procedure   Large Joint Arthrocentesis  Date/Time: 3/6/2018 9:25 AM  Consent given by: patient  Site marked: site marked  Timeout: Immediately prior to procedure a time out was called to verify the correct patient, procedure, equipment, support staff and site/side marked as required   Supporting Documentation  Indications: pain   Procedure Details  Location: knee - L knee  Preparation: Patient was prepped and draped in the usual sterile fashion  Needle size: 22 G  Approach: anterolateral  Patient tolerance: patient tolerated the procedure well with no immediate complications            Patient presents to clinic today for left knee viscosupplement injections.  This is the 1st injection of the series.  I explained details of injections as well as risks, benefits and alternatives with the patient today, had all questions answered, wished to proceed with injections.  I will see patient back in 1 week for repeat injection.  Patient was instructed to watch for signs or symptoms of infection including redness, swelling, warmth to the touch, or significant increased pain and to contact our office immediately if any of these issues were noted.

## 2018-03-13 ENCOUNTER — CLINICAL SUPPORT (OUTPATIENT)
Dept: ORTHOPEDIC SURGERY | Facility: CLINIC | Age: 78
End: 2018-03-13

## 2018-03-13 DIAGNOSIS — R52 PAIN: Primary | ICD-10-CM

## 2018-03-13 DIAGNOSIS — M17.12 PRIMARY OSTEOARTHRITIS OF LEFT KNEE: ICD-10-CM

## 2018-03-13 PROBLEM — M25.569 MECHANICAL KNEE PAIN: Status: RESOLVED | Noted: 2017-02-28 | Resolved: 2018-03-13

## 2018-03-13 PROCEDURE — 20610 DRAIN/INJ JOINT/BURSA W/O US: CPT | Performed by: ORTHOPAEDIC SURGERY

## 2018-03-13 RX ORDER — HYALURONATE SODIUM 30 MG/2 ML
SYRINGE (ML) INTRAARTICULAR
COMMUNITY
Start: 2018-02-26 | End: 2018-10-16

## 2018-03-13 NOTE — PROGRESS NOTES
Subjective:     Patient ID: Joya Singh is a 77 y.o. female.    Chief Complaint:  L knee DJD  History of Present Illness  Joya Singh returns to clinic today for evaluation of left knee     Social History     Occupational History   • Not on file.     Social History Main Topics   • Smoking status: Never Smoker   • Smokeless tobacco: Never Used      Comment: CAFFEINE USE: NONE   • Alcohol use No   • Drug use: No   • Sexual activity: Defer      Comment: EXERCISE - RARELY      Past Medical History:   Diagnosis Date   • Allergic rhinitis    • Anxiety    • Arthritis    • Bell's palsy    • CKD (chronic kidney disease) stage 3, GFR 30-59 ml/min    • Depression    • Diabetes mellitus     LAST A1C 6   • Diabetic gastroparesis 2/19/2016   • GERD (gastroesophageal reflux disease)    • H/O blood clots     LEFT LEG 7 OR 8 YEARS AGO   • Hyperlipidemia    • Hypertension    • Hypothyroidism    • Normal coronary arteries     by cath 2013   • AARON (obstructive sleep apnea)     DOESNT WEAR REGULARLY   • PONV (postoperative nausea and vomiting)    • Skin cancer    • Stroke    • TIA (transient ischemic attack)     LAST TIA JULY 2017     Past Surgical History:   Procedure Laterality Date   • BACK SURGERY      HARDWARE   • CHOLECYSTECTOMY      OPEN   • COLONOSCOPY  2011    due for repeat in 2021   • HYSTERECTOMY      PARTIAL    • NECK SURGERY     • SPINE SURGERY     • UPPER GASTROINTESTINAL ENDOSCOPY  2014    gastritis.  done by dr. hastings       Family History   Problem Relation Age of Onset   • Lupus Mother    • Heart failure Mother 59   • Heart disease Other    • Hypertension Other    • Heart attack Father          Review of Systems   Constitutional: Negative for chills, diaphoresis, fever and unexpected weight change.   HENT: Negative for hearing loss, nosebleeds, sore throat and tinnitus.    Eyes: Negative for pain and visual disturbance.   Respiratory: Negative for cough, shortness of breath and wheezing.    Cardiovascular: Negative for  chest pain and palpitations.   Gastrointestinal: Negative for abdominal pain, diarrhea, nausea and vomiting.   Endocrine: Negative for cold intolerance, heat intolerance and polydipsia.   Genitourinary: Negative for difficulty urinating, dysuria and hematuria.   Musculoskeletal: Positive for arthralgias. Negative for joint swelling and myalgias.   Skin: Negative for rash and wound.   Allergic/Immunologic: Negative for environmental allergies.   Neurological: Negative for dizziness, syncope and numbness.   Hematological: Does not bruise/bleed easily.   Psychiatric/Behavioral: Negative for dysphoric mood and sleep disturbance. The patient is not nervous/anxious.    All other systems reviewed and are negative.          Objective:  There were no vitals filed for this visit.  There were no vitals filed for this visit.  There is no height or weight on file to calculate BMI.  General: No acute distress.  Resp: normal respiratory effort  Skin: no rashes or wounds; normal turgor  Psych: mood and affect appropriate; recent and remote memory intact         Ortho Exam    Exam deferred  Imaging:    Assessment:       1. Pain    2. Primary osteoarthritis of left knee          Large Joint Arthrocentesis  Date/Time: 3/13/2018 9:19 AM  Consent given by: patient  Site marked: site marked  Timeout: Immediately prior to procedure a time out was called to verify the correct patient, procedure, equipment, support staff and site/side marked as required   Supporting Documentation  Indications: pain   Procedure Details  Location: knee - L knee  Preparation: Patient was prepped and draped in the usual sterile fashion  Needle size: 22 G  Approach: anterolateral  Patient tolerance: patient tolerated the procedure well with no immediate complications        Plan:          Discussed treatment options at length with patient at today's visit.     Patient presents to clinic today for left knee viscosupplement injections.  This is the 2nd injection  of the series.  I explained details of injections as well as risks, benefits and alternatives with the patient today, had all questions answered, wished to proceed with injections.  I will see patient back in 1 week for repeat injection.  Patient was instructed to watch for signs or symptoms of infection including redness, swelling, warmth to the touch, or significant increased pain and to contact our office immediately if any of these issues were noted.        Joya Singh was in agreement with plan and had all questions answered.     Orders:  Orders Placed This Encounter   Procedures   • Large Joint Arthrocentesis       Medications:  New Medications Ordered This Visit   Medications   • Hyaluronan (ORTHOVISC) injection 30 mg     Pt supplied medication       Followup:  No Follow-up on file.    Joya was seen today for follow-up and pain.    Diagnoses and all orders for this visit:    Pain  -     Large Joint Arthrocentesis  -     Hyaluronan (ORTHOVISC) injection 30 mg; Inject 2 mL into the joint 1 (One) Time.    Primary osteoarthritis of left knee          Dictated utilizing Dragon dictation

## 2018-03-16 ENCOUNTER — OFFICE VISIT (OUTPATIENT)
Dept: INTERNAL MEDICINE | Facility: CLINIC | Age: 78
End: 2018-03-16

## 2018-03-16 VITALS
HEART RATE: 84 BPM | DIASTOLIC BLOOD PRESSURE: 80 MMHG | SYSTOLIC BLOOD PRESSURE: 126 MMHG | HEIGHT: 62 IN | RESPIRATION RATE: 18 BRPM | TEMPERATURE: 98.2 F | OXYGEN SATURATION: 96 %

## 2018-03-16 DIAGNOSIS — E11.43 TYPE 2 DIABETES MELLITUS WITH DIABETIC AUTONOMIC NEUROPATHY, WITH LONG-TERM CURRENT USE OF INSULIN (HCC): ICD-10-CM

## 2018-03-16 DIAGNOSIS — Z79.4 TYPE 2 DIABETES MELLITUS WITH DIABETIC AUTONOMIC NEUROPATHY, WITH LONG-TERM CURRENT USE OF INSULIN (HCC): ICD-10-CM

## 2018-03-16 DIAGNOSIS — R30.0 DYSURIA: Primary | ICD-10-CM

## 2018-03-16 DIAGNOSIS — R35.0 FREQUENT URINATION: ICD-10-CM

## 2018-03-16 LAB
BILIRUB BLD-MCNC: NEGATIVE MG/DL
CLARITY, POC: ABNORMAL
COLOR UR: YELLOW
GLUCOSE UR STRIP-MCNC: NEGATIVE MG/DL
KETONES UR QL: ABNORMAL
LEUKOCYTE EST, POC: ABNORMAL
NITRITE UR-MCNC: POSITIVE MG/ML
PH UR: 5.5 [PH] (ref 5–8)
PROT UR STRIP-MCNC: ABNORMAL MG/DL
RBC # UR STRIP: NEGATIVE /UL
SP GR UR: 1.03 (ref 1–1.03)
UROBILINOGEN UR QL: NORMAL

## 2018-03-16 PROCEDURE — 99214 OFFICE O/P EST MOD 30 MIN: CPT | Performed by: INTERNAL MEDICINE

## 2018-03-16 PROCEDURE — 81003 URINALYSIS AUTO W/O SCOPE: CPT | Performed by: INTERNAL MEDICINE

## 2018-03-16 RX ORDER — CEFUROXIME AXETIL 250 MG/1
250 TABLET ORAL 2 TIMES DAILY
Qty: 14 TABLET | Refills: 0 | Status: SHIPPED | OUTPATIENT
Start: 2018-03-16 | End: 2018-04-13

## 2018-03-16 NOTE — PROGRESS NOTES
"      Joya Singh is a 77 y.o. female, who presents with a chief complaint of   Chief Complaint   Patient presents with   • Urinary Tract Infection     X3WKS, having to go a lot.    • Chills     X1wk, patient complains of \"just not being able to get warm.\"    • Dizziness     \"just feels sick,\" no appetite.        HPI   The pt has felt bad for about 2 weeks.  She has had chills.  She has dysuria.  She has frequency and urgency.  She has been having accidents.      She quit taking hydrocodone and has been itchy.  Unsure of new exposures.  She is taking tylenol.      She reports that her glucoses have been well controlled.  She has had a few low sugars.  She hasnt had much of an appetite since she felt bad.     The following portions of the patient's history were reviewed and updated as appropriate: allergies, current medications, past family history, past medical history, past social history, past surgical history and problem list.    Allergies: Lisinopril; Morphine and related; Baclofen; Codeine; Morphine; and Penicillins    Review of Systems   Constitutional: Positive for appetite change and chills. Negative for fever.   HENT: Negative.    Eyes: Negative.    Respiratory: Negative.    Cardiovascular: Negative.    Gastrointestinal: Negative.    Endocrine: Negative.    Genitourinary: Positive for difficulty urinating, dysuria, frequency and urgency.   Musculoskeletal: Negative.    Skin: Negative.    Allergic/Immunologic: Negative.    Neurological: Negative.    Hematological: Negative.    Psychiatric/Behavioral: Negative.    All other systems reviewed and are negative.            Wt Readings from Last 3 Encounters:   02/21/18 96.2 kg (212 lb)   11/07/17 96.6 kg (213 lb)   11/03/17 96.9 kg (213 lb 9.6 oz)     Temp Readings from Last 3 Encounters:   03/16/18 98.2 °F (36.8 °C)   02/21/18 98.6 °F (37 °C)   11/06/17 97.9 °F (36.6 °C) (Oral)     BP Readings from Last 3 Encounters:   03/16/18 126/80   02/21/18 116/82   12/26/17 " 122/84     Pulse Readings from Last 3 Encounters:   03/16/18 84   02/21/18 86   12/26/17 72     There is no height or weight on file to calculate BMI.  @LASTSAO2(3)@    Physical Exam   Constitutional: She is oriented to person, place, and time. She appears well-developed and well-nourished. No distress.   HENT:   Head: Normocephalic and atraumatic.   Right Ear: External ear normal.   Left Ear: External ear normal.   Nose: Nose normal.   Mouth/Throat: Oropharynx is clear and moist.   Eyes: Conjunctivae and EOM are normal. Pupils are equal, round, and reactive to light.   Neck: Normal range of motion. Neck supple.   Cardiovascular: Normal rate, regular rhythm, normal heart sounds and intact distal pulses.    Pulmonary/Chest: Effort normal and breath sounds normal. No respiratory distress. She has no wheezes.   Musculoskeletal:   In wheelchair   Neurological: She is alert and oriented to person, place, and time.   Skin: Skin is warm and dry.   Psychiatric: She has a normal mood and affect. Her behavior is normal. Judgment and thought content normal.   Nursing note and vitals reviewed.      Results for orders placed or performed in visit on 03/16/18   POCT urinalysis dipstick, automated   Result Value Ref Range    Color Yellow Yellow, Straw, Dark Yellow, Mehnaz    Clarity, UA Cloudy (A) Clear    Glucose, UA Negative Negative, 1000 mg/dL (3+) mg/dL    Bilirubin Negative Negative    Ketones, UA Trace (A) Negative    Specific Gravity  1.030 1.005 - 1.030    Blood, UA Negative Negative    pH, Urine 5.5 5.0 - 8.0    Protein,  mg/dL (A) Negative mg/dL    Urobilinogen, UA Normal Normal    Leukocytes Small (1+) (A) Negative    Nitrite, UA Positive (A) Negative           Joya was seen today for urinary tract infection, chills and dizziness.    Diagnoses and all orders for this visit:    Dysuria - previous urine cultures reviewed.   -     cefuroxime (CEFTIN) 250 MG tablet; Take 1 tablet by mouth 2 (Two) Times a  Day.    Frequent urinationn - likely UTI  -     POCT urinalysis dipstick, automated  -     Urine Culture - Urine, Urine, Clean Catch; Future  -     Urine Culture - Urine, Urine, Clean Catch    Type 2 diabetes mellitus with diabetic autonomic neuropathy, with long-term current use of insulin    cont current meds.  Eat regularly.  Monitor glucoses very closely given hypoglycemia and UTI.       Outpatient Medications Prior to Visit   Medication Sig Dispense Refill   • ACCU-CHEK FASTCLIX LANCETS misc TEST 3-4 TIMES DAILY AS DIRECTED 120 each 10   • ACCU-CHEK SMARTVIEW test strip      • acetaminophen (TYLENOL) 325 MG tablet Take 2 tablets by mouth Every 6 (Six) Hours As Needed for Mild Pain . OTC product 40 tablet 0   • Alcohol Swabs (B-D SINGLE USE SWABS REGULAR) pads      • aspirin 325 MG tablet Take 325 mg by mouth Daily.     • atorvastatin (LIPITOR) 10 MG tablet Take 10 mg by mouth Every Night.     • Blood Glucose Monitoring Suppl (ACCU-CHEK KENNETH SMARTVIEW) w/Device kit TEST blood sugar three times daily or as directed 1 kit 0   • buPROPion SR (WELLBUTRIN SR) 150 MG 12 hr tablet Take 150 mg by mouth Every Night.     • Calcium Carb-Cholecalciferol (CALCIUM PLUS VITAMIN D3 PO) Take 1 tablet by mouth Every Morning.     • colchicine 0.6 MG tablet 2 pills po now then 1 pill po 1 hour later for gout 3 tablet 0   • Cyanocobalamin (VITAMELTS ENERGY VITAMIN B-12) 1500 MCG tablet dispersible Take 1 tablet by mouth Every Morning.     • cyclobenzaprine (FLEXERIL) 10 MG tablet Take 10 mg by mouth 3 (Three) Times a Day As Needed for Muscle Spasms.     • DULoxetine (CYMBALTA) 60 MG capsule Take 60 mg by mouth Every Morning.     • ELIQUIS 2.5 MG tablet tablet Take one (1) tablet orally (by mouth) twice daily 60 tablet 2   • furosemide (LASIX) 20 MG tablet Take 20 mg by mouth Every Morning.     • gabapentin (NEURONTIN) 400 MG capsule Take one (1) capsule orally (by mouth) each morning then one (1) in the afternoon and two (2) each  "night at bedtime 120 capsule 1   • insulin aspart (NOVOLOG FLEXPEN) 100 UNIT/ML solution pen-injector sc pen 20 units sq before breakfast then 25 units sq before lunch and dinner (Patient taking differently: 3 (Three) Times a Day With Meals. 20 units sq before breakfast then 25 units sq before lunch and dinner) 15 mL 6   • Insulin Pen Needle (ULTICARE MINI PEN NEEDLES) 31G X 6 MM misc To check blood sugar once daily 100 each 3   • levothyroxine (SYNTHROID, LEVOTHROID) 75 MCG tablet Take 75 mcg by mouth Every Morning.     • melatonin 5 MG tablet tablet Take 5 mg by mouth At Night As Needed.     • metoprolol tartrate (LOPRESSOR) 12.5 MG half tablet Take 12.5 mg by mouth 2 (Two) Times a Day.     • Needle, Disp, (BD DISP NEEDLES) 30G X 1/2\" misc To be used 3 times daily with Novolog Flexpen. 100 each 5   • O2 (OXYGEN) Inhale 2 L/min Every Night.     • omeprazole (priLOSEC) 40 MG capsule Take 40 mg by mouth Every Morning.     • ORTHOVISC 30 MG/2ML solution prefilled syringe injection      • potassium chloride (K-DUR) 10 MEQ CR tablet Take 10 mEq by mouth Every Morning.     • thiamine (VITAMIN B-1) 100 MG tablet Take 100 mg by mouth Daily.     • TRESIBA FLEXTOUCH 200 UNIT/ML solution pen-injector Inject 80 Units under the skin Every Night.     • TRESIBA FLEXTOUCH 200 UNIT/ML solution pen-injector Inject eighty (80) UNITS subcutaneously once daily 9 mL 3   • ULTICARE MICRO PEN NEEDLES 32G X 4 MM misc      • ULTICARE PEN NEEDLES 29G X 12MM misc      • cefuroxime (CEFTIN) 250 MG tablet Take 1 tablet by mouth 2 (Two) Times a Day. 20 tablet 0   • HYDROcodone-acetaminophen (NORCO) 5-325 MG per tablet Take 1 tablet by mouth Every 12 (Twelve) Hours As Needed for Moderate Pain . 60 tablet 0     No facility-administered medications prior to visit.      New Medications Ordered This Visit   Medications   • cefuroxime (CEFTIN) 250 MG tablet     Sig: Take 1 tablet by mouth 2 (Two) Times a Day.     Dispense:  14 tablet     Refill:  0 "       Medications Discontinued During This Encounter   Medication Reason   • cefuroxime (CEFTIN) 250 MG tablet *Therapy completed   • HYDROcodone-acetaminophen (NORCO) 5-325 MG per tablet *Therapy completed         Return for Next scheduled follow up.

## 2018-03-20 ENCOUNTER — CLINICAL SUPPORT (OUTPATIENT)
Dept: ORTHOPEDIC SURGERY | Facility: CLINIC | Age: 78
End: 2018-03-20

## 2018-03-20 DIAGNOSIS — M17.12 PRIMARY OSTEOARTHRITIS OF LEFT KNEE: Primary | ICD-10-CM

## 2018-03-20 PROCEDURE — 20610 DRAIN/INJ JOINT/BURSA W/O US: CPT | Performed by: NURSE PRACTITIONER

## 2018-03-20 NOTE — PROGRESS NOTES
Procedure   Large Joint Arthrocentesis  Date/Time: 3/20/2018 10:51 AM  Consent given by: patient  Site marked: site marked  Timeout: Immediately prior to procedure a time out was called to verify the correct patient, procedure, equipment, support staff and site/side marked as required   Supporting Documentation  Indications: pain   Procedure Details  Location: knee - L knee  Preparation: Patient was prepped and draped in the usual sterile fashion  Needle size: 22 G  Approach: anterolateral  Patient tolerance: patient tolerated the procedure well with no immediate complications            Patient presented today for viscosupplement injection.  This is the third injection for the left knee.  We will see patient back in six weeks with Dr. Wylie.  We reviewed risks benefits and alternatives of the procedure and patient wished to proceed today and had all questions answered

## 2018-03-22 LAB
BACTERIA UR CULT: ABNORMAL
BACTERIA UR CULT: ABNORMAL
OTHER ANTIBIOTIC SUSC ISLT: ABNORMAL

## 2018-03-26 RX ORDER — CYCLOBENZAPRINE HCL 10 MG
10 TABLET ORAL 3 TIMES DAILY PRN
Qty: 30 TABLET | Refills: 1 | Status: SHIPPED | OUTPATIENT
Start: 2018-03-26 | End: 2018-05-10 | Stop reason: SDUPTHER

## 2018-03-27 DIAGNOSIS — E11.43 TYPE 2 DIABETES MELLITUS WITH AUTONOMIC NEUROPATHY (HCC): ICD-10-CM

## 2018-03-27 DIAGNOSIS — E10.9 BRITTLE DIABETES MELLITUS (HCC): ICD-10-CM

## 2018-03-27 RX ORDER — INSULIN DEGLUDEC 200 U/ML
INJECTION, SOLUTION SUBCUTANEOUS
Qty: 9 ML | Refills: 5 | Status: SHIPPED | OUTPATIENT
Start: 2018-03-27 | End: 2018-05-10 | Stop reason: SDUPTHER

## 2018-04-12 ENCOUNTER — OFFICE VISIT (OUTPATIENT)
Dept: ORTHOPEDIC SURGERY | Facility: CLINIC | Age: 78
End: 2018-04-12

## 2018-04-12 VITALS — WEIGHT: 207 LBS | BODY MASS INDEX: 36.68 KG/M2 | HEIGHT: 63 IN

## 2018-04-12 DIAGNOSIS — M19.012 PRIMARY LOCALIZED OSTEOARTHROSIS OF LEFT SHOULDER REGION: Primary | ICD-10-CM

## 2018-04-12 PROCEDURE — 99213 OFFICE O/P EST LOW 20 MIN: CPT | Performed by: NURSE PRACTITIONER

## 2018-04-12 PROCEDURE — 20610 DRAIN/INJ JOINT/BURSA W/O US: CPT | Performed by: NURSE PRACTITIONER

## 2018-04-12 RX ORDER — BUDESONIDE 0.25 MG/2ML
INHALANT ORAL
Status: ON HOLD | COMMUNITY
Start: 2018-04-10 | End: 2018-10-25

## 2018-04-12 RX ADMIN — LIDOCAINE HYDROCHLORIDE 8 ML: 10 INJECTION, SOLUTION EPIDURAL; INFILTRATION; INTRACAUDAL; PERINEURAL at 14:19

## 2018-04-12 RX ADMIN — BETAMETHASONE SODIUM PHOSPHATE AND BETAMETHASONE ACETATE 12 MG: 3; 3 INJECTION, SUSPENSION INTRA-ARTICULAR; INTRALESIONAL; INTRAMUSCULAR; SOFT TISSUE at 14:19

## 2018-04-12 NOTE — PROGRESS NOTES
Subjective:     Patient ID: Joya Singh is a 78 y.o. female.    Chief Complaint: primary osteoarthritis left shoulder    History of Present Illness    Ms. Singh presents to clinic with complaints of pain at left shoulder. She has only been seen by Dr. Wylie in past for left shoulder pain. Pain present over lateral aspect left shoulder along with pain over anterior and posterior joint line. Increased grinding, crepitus present with all range of motion activities. Rates pain at 8/10 aching in nature with all lifting, reaching and range of motion activities. States she knows she needs shoulder surgery however would like to discuss with Dr. Wylie knee surgery before proceeding with shoulder surgery. Pain present over for the last 3-4 weeks. Denies recent injury to left shoulder. She has been seen in past by this APRN for viscouspplement injections. This is the first visit for this APRN. Denies all other concerns present at this time.      Social History     Occupational History   • Not on file.     Social History Main Topics   • Smoking status: Never Smoker   • Smokeless tobacco: Never Used      Comment: CAFFEINE USE: NONE   • Alcohol use No   • Drug use: No   • Sexual activity: Defer      Comment: EXERCISE - RARELY      Past Medical History:   Diagnosis Date   • Allergic rhinitis    • Anxiety    • Arthritis    • Bell's palsy    • CKD (chronic kidney disease) stage 3, GFR 30-59 ml/min    • Depression    • Diabetes mellitus     LAST A1C 6   • Diabetic gastroparesis 2/19/2016   • GERD (gastroesophageal reflux disease)    • H/O blood clots     LEFT LEG 7 OR 8 YEARS AGO   • Hyperlipidemia    • Hypertension    • Hypothyroidism    • Normal coronary arteries     by cath 2013   • AAORN (obstructive sleep apnea)     DOESNT WEAR REGULARLY   • PONV (postoperative nausea and vomiting)    • Skin cancer    • Stroke    • TIA (transient ischemic attack)     LAST TIA JULY 2017     Past Surgical History:   Procedure Laterality Date   • BACK  "SURGERY      HARDWARE   • CHOLECYSTECTOMY      OPEN   • COLONOSCOPY  2011    due for repeat in 2021   • HYSTERECTOMY      PARTIAL    • NECK SURGERY     • SPINE SURGERY     • UPPER GASTROINTESTINAL ENDOSCOPY  2014    gastritis.  done by dr. hastings       Family History   Problem Relation Age of Onset   • Lupus Mother    • Heart failure Mother 59   • Heart disease Other    • Hypertension Other    • Heart attack Father          Review of Systems   Constitutional: Negative for chills, diaphoresis, fever and unexpected weight change.   HENT: Negative for hearing loss, nosebleeds, sore throat and tinnitus.    Eyes: Negative for pain and visual disturbance.   Respiratory: Negative for cough, shortness of breath and wheezing.    Cardiovascular: Negative for chest pain and palpitations.   Gastrointestinal: Negative for abdominal pain, diarrhea, nausea and vomiting.   Endocrine: Negative for cold intolerance, heat intolerance and polydipsia.   Genitourinary: Negative for difficulty urinating, dysuria and hematuria.   Musculoskeletal: Positive for arthralgias and myalgias. Negative for joint swelling.   Skin: Negative for rash and wound.   Allergic/Immunologic: Negative for environmental allergies.   Neurological: Negative for dizziness, syncope and numbness.   Hematological: Does not bruise/bleed easily.   Psychiatric/Behavioral: Negative for dysphoric mood and sleep disturbance. The patient is not nervous/anxious.            Objective:  Physical Exam  General: No acute distress.  Eyes: conjunctiva clear; pupils equally round and reactive  ENT: external ears and nose atraumatic; oropharynx clear  CV: no peripheral edema  Resp: normal respiratory effort  Skin: no rashes or wounds; normal turgor  Psych: mood and affect appropriate; recent and remote memory intact    Vitals:    04/12/18 1415   Weight: 93.9 kg (207 lb)   Height: 158.8 cm (62.5\")     1    04/12/18  1415   Weight: 93.9 kg (207 lb)     Body mass index is 37.26 " kg/m².     Left Shoulder Exam     Range of Motion   Forward Flexion: 90   External Rotation: 20   Internal Rotation 0 degrees: abnormal     Muscle Strength   Internal Rotation: 3/5   External Rotation: 3/5   Supraspinatus: 3/5   Subscapularis: 3/5   Biceps: 3/5     Tests   Cross Arm: positive    Other   Erythema: absent  Scars: absent  Sensation: normal  Pulse: present     Comments:  Pain present over acromion, anterior and posterior joint lines. Crepitus present with active and passive range of motion activities.           Assessment:       1. Primary localized osteoarthrosis of left shoulder region          Plan:  1. Discussed plan of care with patient. Wishes to proceed with corticosteroid injection left shoulder. Will plan to see her back in 4 months, prn. Patient verbalized understanding of all information and agrees with plan of care. Denies all other concerns present at this time.     Large Joint Arthrocentesis  Date/Time: 4/12/2018 2:19 PM  Consent given by: patient  Site marked: site marked  Timeout: Immediately prior to procedure a time out was called to verify the correct patient, procedure, equipment, support staff and site/side marked as required   Supporting Documentation  Indications: pain   Procedure Details  Location: shoulder - L subacromial bursa  Preparation: Patient was prepped and draped in the usual sterile fashion  Needle size: 22 G  Approach: lateral  Medications administered: 8 mL lidocaine PF 1% 1 %; 12 mg betamethasone acetate-betamethasone sodium phosphate 6 (3-3) MG/ML  Patient tolerance: patient tolerated the procedure well with no immediate complications          Orders:  Orders Placed This Encounter   Procedures   • Large Joint Arthrocentesis       Dictated utilizing Dragon dictation

## 2018-04-13 ENCOUNTER — TELEPHONE (OUTPATIENT)
Dept: INTERNAL MEDICINE | Facility: CLINIC | Age: 78
End: 2018-04-13

## 2018-04-13 ENCOUNTER — OFFICE VISIT (OUTPATIENT)
Dept: INTERNAL MEDICINE | Facility: CLINIC | Age: 78
End: 2018-04-13

## 2018-04-13 VITALS
HEART RATE: 81 BPM | OXYGEN SATURATION: 98 % | HEIGHT: 63 IN | RESPIRATION RATE: 16 BRPM | TEMPERATURE: 98.1 F | DIASTOLIC BLOOD PRESSURE: 76 MMHG | SYSTOLIC BLOOD PRESSURE: 150 MMHG | BODY MASS INDEX: 37.21 KG/M2 | WEIGHT: 210 LBS

## 2018-04-13 DIAGNOSIS — E11.43 TYPE 2 DIABETES MELLITUS WITH DIABETIC AUTONOMIC NEUROPATHY, WITH LONG-TERM CURRENT USE OF INSULIN (HCC): ICD-10-CM

## 2018-04-13 DIAGNOSIS — R30.0 DYSURIA: Primary | ICD-10-CM

## 2018-04-13 DIAGNOSIS — R07.89 ATYPICAL CHEST PAIN: ICD-10-CM

## 2018-04-13 DIAGNOSIS — Z79.4 TYPE 2 DIABETES MELLITUS WITH DIABETIC AUTONOMIC NEUROPATHY, WITH LONG-TERM CURRENT USE OF INSULIN (HCC): ICD-10-CM

## 2018-04-13 DIAGNOSIS — R82.998 LEUKOCYTES IN URINE: ICD-10-CM

## 2018-04-13 LAB
BILIRUB BLD-MCNC: NEGATIVE MG/DL
CLARITY, POC: ABNORMAL
COLOR UR: YELLOW
GLUCOSE UR STRIP-MCNC: NEGATIVE MG/DL
KETONES UR QL: NEGATIVE
LEUKOCYTE EST, POC: ABNORMAL
NITRITE UR-MCNC: NEGATIVE MG/ML
PH UR: 5.5 [PH] (ref 5–8)
PROT UR STRIP-MCNC: NEGATIVE MG/DL
RBC # UR STRIP: NEGATIVE /UL
SP GR UR: 1.02 (ref 1–1.03)
UROBILINOGEN UR QL: NORMAL

## 2018-04-13 PROCEDURE — 81002 URINALYSIS NONAUTO W/O SCOPE: CPT | Performed by: INTERNAL MEDICINE

## 2018-04-13 PROCEDURE — 99214 OFFICE O/P EST MOD 30 MIN: CPT | Performed by: INTERNAL MEDICINE

## 2018-04-13 PROCEDURE — 93000 ELECTROCARDIOGRAM COMPLETE: CPT | Performed by: INTERNAL MEDICINE

## 2018-04-13 RX ORDER — FLUCONAZOLE 150 MG/1
150 TABLET ORAL ONCE
Qty: 1 TABLET | Refills: 0 | Status: SHIPPED | OUTPATIENT
Start: 2018-04-13 | End: 2018-04-13

## 2018-04-13 RX ORDER — NYSTATIN 100000 [USP'U]/G
POWDER TOPICAL 3 TIMES DAILY
Qty: 56.7 G | Refills: 1 | Status: ON HOLD | OUTPATIENT
Start: 2018-04-13 | End: 2018-11-20

## 2018-04-13 RX ORDER — CEPHALEXIN 500 MG/1
500 CAPSULE ORAL 3 TIMES DAILY
Qty: 21 CAPSULE | Refills: 0 | Status: SHIPPED | OUTPATIENT
Start: 2018-04-13 | End: 2018-04-20

## 2018-04-13 NOTE — PROGRESS NOTES
Joya Singh is a 77 y.o. female, who presents with a chief complaint of   Chief Complaint   Patient presents with   • Diabetes     elevated blood sugar   • Difficulty Urinating     with burning   • Foot Swelling       HPI   The pt is here with her son.  She feels bad and is dizzy.  Her bp is up some.  She has had dysuria and recurrent UTI's.  + sweats last night.  She got a steroid shot in her shoulder yesterday.  She has had glucoses in the 400's.  She is on 75 units of tresiba and takes novolog 15-20 units tid.      As I was heading out the door the pt says she is having more frequent chest pain.  The pain is every other day.  Pain can happen with activity or rest.  She had a stress test that was low risk last year.  Cath normal in 2013.      The following portions of the patient's history were reviewed and updated as appropriate: allergies, current medications, past family history, past medical history, past social history, past surgical history and problem list.    Allergies: Lisinopril; Morphine and related; Baclofen; Codeine; Morphine; and Penicillins    Review of Systems   Constitutional: Negative.    HENT: Negative.    Eyes: Negative.    Respiratory: Negative.    Cardiovascular: Negative.    Gastrointestinal: Negative.    Endocrine: Negative.    Genitourinary: Positive for dysuria.   Musculoskeletal: Negative.    Skin: Negative.    Allergic/Immunologic: Negative.    Neurological: Positive for dizziness.   Hematological: Negative.    Psychiatric/Behavioral: Negative.    All other systems reviewed and are negative.            Wt Readings from Last 3 Encounters:   04/13/18 95.3 kg (210 lb)   04/12/18 93.9 kg (207 lb)   02/21/18 96.2 kg (212 lb)     Temp Readings from Last 3 Encounters:   04/13/18 98.1 °F (36.7 °C) (Oral)   03/16/18 98.2 °F (36.8 °C)   02/21/18 98.6 °F (37 °C)     BP Readings from Last 3 Encounters:   04/13/18 150/76   03/16/18 126/80   02/21/18 116/82     Pulse Readings from Last 3  Encounters:   04/13/18 81   03/16/18 84   02/21/18 86     Body mass index is 37.8 kg/m².  @LASTSAO2(3)@    Physical Exam   Constitutional: She is oriented to person, place, and time. She appears well-developed and well-nourished. No distress.   HENT:   Head: Normocephalic and atraumatic.   Right Ear: External ear normal.   Left Ear: External ear normal.   Nose: Nose normal.   Mouth/Throat: Oropharynx is clear and moist.   Eyes: Conjunctivae and EOM are normal. Pupils are equal, round, and reactive to light.   Neck: Normal range of motion. Neck supple.   Cardiovascular: Normal rate, regular rhythm, normal heart sounds and intact distal pulses.    Pulmonary/Chest: Effort normal and breath sounds normal. No respiratory distress. She has no wheezes.   Musculoskeletal:   Pt in wheelchair   Neurological: She is alert and oriented to person, place, and time.   Skin: Skin is warm and dry.   Psychiatric: She has a normal mood and affect. Her behavior is normal. Judgment and thought content normal.   Nursing note and vitals reviewed.      Results for orders placed or performed in visit on 04/13/18   POCT urinalysis dipstick, manual   Result Value Ref Range    Color Yellow Yellow, Straw, Dark Yellow, Mehnaz    Clarity, UA Cloudy (A) Clear    Glucose, UA Negative Negative, 1000 mg/dL (3+) mg/dL    Bilirubin Negative Negative    Ketones, UA Negative Negative    Specific Gravity  1.020 1.005 - 1.030    Blood, UA Negative Negative    pH, Urine 5.5 5.0 - 8.0    Protein, POC Negative Negative mg/dL    Urobilinogen, UA Normal Normal    Leukocytes Trace (A) Negative    Nitrite, UA Negative Negative       ECG 12 Lead  Date/Time: 4/13/2018 5:02 PM  Performed by: MARIJA REA  Authorized by: MARIJA REA   Comparison: compared with previous ECG from 11/3/2017  Similar to previous ECG  Rhythm: sinus rhythm  Rate: normal  Conduction: right bundle branch block and LAFB  QRS axis: left  Clinical impression: abnormal  ECG                Joya was seen today for diabetes, difficulty urinating and foot swelling.    Diagnoses and all orders for this visit:    Dysuria  -     POCT urinalysis dipstick, manual  -     Urine Culture - Urine, Urine, Clean Catch  -     cephalexin (KEFLEX) 500 MG capsule; Take 1 capsule by mouth 3 (Three) Times a Day for 7 days.  -     fluconazole (DIFLUCAN) 150 MG tablet; Take 1 tablet by mouth 1 (One) Time for 1 dose.    Leukocytes in urine  -     Urine Culture - Urine, Urine, Clean Catch  -     cephalexin (KEFLEX) 500 MG capsule; Take 1 capsule by mouth 3 (Three) Times a Day for 7 days.  -     fluconazole (DIFLUCAN) 150 MG tablet; Take 1 tablet by mouth 1 (One) Time for 1 dose.    Type 2 diabetes mellitus with diabetic autonomic neuropathy, with long-term current use of insulin    Atypical chest pain  -     ECG 12 Lead    Other orders  -     nystatin (MYCOSTATIN) 878700 UNIT/GM powder; Apply  topically 3 (Three) Times a Day.    increase tresiba to 80 units daily and novolog 20 units with breakfast and 25 units with lunch and dinner.     Nystatin for skin irritation/rash under breasts    ekg unchanged.  Needs follow up appointment with cardiology . If any worsening to ER immediately.    Outpatient Medications Prior to Visit   Medication Sig Dispense Refill   • ACCU-CHEK FASTCLIX LANCETS misc TEST 3-4 TIMES DAILY AS DIRECTED 120 each 10   • ACCU-CHEK SMARTVIEW test strip      • acetaminophen (TYLENOL) 325 MG tablet Take 2 tablets by mouth Every 6 (Six) Hours As Needed for Mild Pain . OTC product 40 tablet 0   • Alcohol Swabs (B-D SINGLE USE SWABS REGULAR) pads      • aspirin 325 MG tablet Take 325 mg by mouth Daily.     • atorvastatin (LIPITOR) 10 MG tablet Take 10 mg by mouth Every Night.     • Blood Glucose Monitoring Suppl (ACCU-CHEK KENNETH SMARTVIEW) w/Device kit TEST blood sugar three times daily or as directed 1 kit 0   • budesonide (PULMICORT) 0.25 MG/2ML nebulizer solution      • buPROPion SR  "(WELLBUTRIN SR) 150 MG 12 hr tablet Take 150 mg by mouth Every Night.     • Calcium Carb-Cholecalciferol (CALCIUM PLUS VITAMIN D3 PO) Take 1 tablet by mouth Every Morning.     • colchicine 0.6 MG tablet 2 pills po now then 1 pill po 1 hour later for gout 3 tablet 0   • Cyanocobalamin (VITAMELTS ENERGY VITAMIN B-12) 1500 MCG tablet dispersible Take 1 tablet by mouth Every Morning.     • cyclobenzaprine (FLEXERIL) 10 MG tablet Take 1 tablet by mouth 3 (Three) Times a Day As Needed for Muscle Spasms. 30 tablet 1   • DULoxetine (CYMBALTA) 60 MG capsule Take 60 mg by mouth Every Morning.     • ELIQUIS 2.5 MG tablet tablet Take one (1) tablet orally (by mouth) twice daily 60 tablet 2   • furosemide (LASIX) 20 MG tablet Take 20 mg by mouth Every Morning.     • gabapentin (NEURONTIN) 400 MG capsule Take one (1) capsule orally (by mouth) each morning then one (1) in the afternoon and two (2) each night at bedtime 120 capsule 1   • insulin aspart (NOVOLOG FLEXPEN) 100 UNIT/ML solution pen-injector sc pen 20 units sq before breakfast then 25 units sq before lunch and dinner (Patient taking differently: 3 (Three) Times a Day With Meals. 20 units sq before breakfast then 25 units sq before lunch and dinner) 15 mL 6   • Insulin Pen Needle (ULTICARE MINI PEN NEEDLES) 31G X 6 MM misc To check blood sugar once daily 100 each 3   • levothyroxine (SYNTHROID, LEVOTHROID) 75 MCG tablet Take 75 mcg by mouth Every Morning.     • melatonin 5 MG tablet tablet Take 5 mg by mouth At Night As Needed.     • metoprolol tartrate (LOPRESSOR) 12.5 MG half tablet Take 12.5 mg by mouth 2 (Two) Times a Day.     • Needle, Disp, (BD DISP NEEDLES) 30G X 1/2\" misc To be used 3 times daily with Novolog Flexpen. 100 each 5   • O2 (OXYGEN) Inhale 2 L/min Every Night.     • omeprazole (priLOSEC) 40 MG capsule Take 40 mg by mouth Every Morning.     • ORTHOVISC 30 MG/2ML solution prefilled syringe injection      • potassium chloride (K-DUR) 10 MEQ CR tablet " Take 10 mEq by mouth Every Morning.     • thiamine (VITAMIN B-1) 100 MG tablet Take 100 mg by mouth Daily.     • TRESIBA FLEXTOUCH 200 UNIT/ML solution pen-injector Inject 80 Units under the skin Every Night.     • TRESIBA FLEXTOUCH 200 UNIT/ML solution pen-injector Inject eighty (80) UNITS subcutaneously once daily 9 mL 5   • ULTICARE MICRO PEN NEEDLES 32G X 4 MM misc      • ULTICARE PEN NEEDLES 29G X 12MM misc      • cefuroxime (CEFTIN) 250 MG tablet Take 1 tablet by mouth 2 (Two) Times a Day. 14 tablet 0     No facility-administered medications prior to visit.      New Medications Ordered This Visit   Medications   • cephalexin (KEFLEX) 500 MG capsule     Sig: Take 1 capsule by mouth 3 (Three) Times a Day for 7 days.     Dispense:  21 capsule     Refill:  0   • fluconazole (DIFLUCAN) 150 MG tablet     Sig: Take 1 tablet by mouth 1 (One) Time for 1 dose.     Dispense:  1 tablet     Refill:  0   • nystatin (MYCOSTATIN) 259144 UNIT/GM powder     Sig: Apply  topically 3 (Three) Times a Day.     Dispense:  56.7 g     Refill:  1     [unfilled]  Medications Discontinued During This Encounter   Medication Reason   • cefuroxime (CEFTIN) 250 MG tablet          Return in about 2 weeks (around 4/27/2018) for Recheck, labs.

## 2018-04-13 NOTE — TELEPHONE ENCOUNTER
Patient with poss uti and pedal edema.  Worked in this afternoon per Dr. LILLY    ----- Message from Beth Farley MA sent at 4/13/2018  1:15 PM EDT -----  Regarding: FW: SUGAR HIGH  Contact: [phone]      ----- Message -----  From: Deb Corea  Sent: 4/13/2018  12:54 PM  To: [facility]  Subject: SUGAR HIGH                                       [facility]    Patient called - she is very worried about her sugar.  She said that it was 471 this morning  Took novalog 15  Then it was 468 -- took 20 novalog  Now it is 431 after 20 more novalog    Please call her back    Thanks!  deb

## 2018-04-13 NOTE — TELEPHONE ENCOUNTER
Called patient back.  Advised she needs to needs to keep appt today @ 3:30 regardless.  She voiced understanding and is on her way.    ----- Message from Deb Corea sent at 4/13/2018  1:30 PM EDT -----  Regarding: FW: SUGAR HIGH  Contact: [phone]  Patient just called back to say that she did have a cortisone shot yesterday and that might be the reason for her sugar to be so high.  ?????    Thanks!  deb          ----- Message -----  From: Deb Corea  Sent: 4/13/2018  12:54 PM  To: [facility]  Subject: SUGAR HIGH                                       [facility]    Patient called - she is very worried about her sugar.  She said that it was 471 this morning  Took novalog 15  Then it was 468 -- took 20 novalog  Now it is 431 after 20 more novalog    Please call her back    Thanks!  deb

## 2018-04-15 LAB
BACTERIA UR CULT: NORMAL
BACTERIA UR CULT: NORMAL

## 2018-04-16 RX ORDER — BLOOD SUGAR DIAGNOSTIC
STRIP MISCELLANEOUS
Qty: 300 EACH | Refills: 3 | Status: SHIPPED | OUTPATIENT
Start: 2018-04-16 | End: 2019-05-22 | Stop reason: SDUPTHER

## 2018-04-17 ENCOUNTER — TELEPHONE (OUTPATIENT)
Dept: INTERNAL MEDICINE | Facility: CLINIC | Age: 78
End: 2018-04-17

## 2018-04-17 RX ORDER — LIDOCAINE HYDROCHLORIDE 10 MG/ML
8 INJECTION, SOLUTION EPIDURAL; INFILTRATION; INTRACAUDAL; PERINEURAL
Status: COMPLETED | OUTPATIENT
Start: 2018-04-12 | End: 2018-04-12

## 2018-04-17 RX ORDER — BETAMETHASONE SODIUM PHOSPHATE AND BETAMETHASONE ACETATE 3; 3 MG/ML; MG/ML
12 INJECTION, SUSPENSION INTRA-ARTICULAR; INTRALESIONAL; INTRAMUSCULAR; SOFT TISSUE
Status: COMPLETED | OUTPATIENT
Start: 2018-04-12 | End: 2018-04-12

## 2018-04-17 NOTE — TELEPHONE ENCOUNTER
Attempted to notify patient, no answer, no voicemail set up.   ----- Message from Chari Mercado MD sent at 4/16/2018  5:05 PM EDT -----  Call pt about labs.  Urine cx neg.  Call to check on pt

## 2018-04-17 NOTE — TELEPHONE ENCOUNTER
Patient notified.  ----- Message from Chari Mercado MD sent at 4/16/2018  5:05 PM EDT -----  Call pt about labs.  Urine cx neg.  Call to check on pt

## 2018-04-18 ENCOUNTER — OFFICE VISIT (OUTPATIENT)
Dept: INTERNAL MEDICINE | Facility: CLINIC | Age: 78
End: 2018-04-18

## 2018-04-18 VITALS
OXYGEN SATURATION: 97 % | HEART RATE: 64 BPM | HEIGHT: 63 IN | DIASTOLIC BLOOD PRESSURE: 80 MMHG | SYSTOLIC BLOOD PRESSURE: 122 MMHG

## 2018-04-18 DIAGNOSIS — Z79.4 TYPE 2 DIABETES MELLITUS WITH DIABETIC AUTONOMIC NEUROPATHY, WITH LONG-TERM CURRENT USE OF INSULIN (HCC): Primary | ICD-10-CM

## 2018-04-18 DIAGNOSIS — R07.89 ATYPICAL CHEST PAIN: ICD-10-CM

## 2018-04-18 DIAGNOSIS — F41.9 ANXIETY: ICD-10-CM

## 2018-04-18 DIAGNOSIS — E11.43 TYPE 2 DIABETES MELLITUS WITH DIABETIC AUTONOMIC NEUROPATHY, WITH LONG-TERM CURRENT USE OF INSULIN (HCC): Primary | ICD-10-CM

## 2018-04-18 DIAGNOSIS — S83.232D COMPLEX TEAR OF MEDIAL MENISCUS OF LEFT KNEE AS CURRENT INJURY, SUBSEQUENT ENCOUNTER: ICD-10-CM

## 2018-04-18 DIAGNOSIS — I10 ESSENTIAL HYPERTENSION: ICD-10-CM

## 2018-04-18 PROCEDURE — 99214 OFFICE O/P EST MOD 30 MIN: CPT | Performed by: INTERNAL MEDICINE

## 2018-04-18 RX ORDER — DULOXETIN HYDROCHLORIDE 30 MG/1
90 CAPSULE, DELAYED RELEASE ORAL DAILY
Qty: 270 CAPSULE | Refills: 1 | Status: SHIPPED | OUTPATIENT
Start: 2018-04-18 | End: 2018-07-17

## 2018-04-18 NOTE — PROGRESS NOTES
Joya Singh is a 78 y.o. female, who presents with a chief complaint of   Chief Complaint   Patient presents with   • Follow-up     Currently on Keflex.   • Urinary Frequency       HPI   The pt is here for follow up.  She hasnt been feeling well.  She has had more chest pains.  The pain has woken her from sleep.  The pain is usually on the right side.  No increased sx with activity.  She sleeps on her side.  No orthopnea.  No increased LE edema.  She has a fairly sedentary lifestyle. She had a stress test in September 2017 that was low risk, echo at that time showed grade I diastolic dysfunction but EF normal. She has f/u with dr. Sanon on 5/8.  ekg on 4/16 unchanged.      She is coughing a lot.  She saw pulmonology and she is on nebulizer treatments.  She uses Oxygen through her cpap machine.     She has had chronic anxiety/depression.  She says her sx are some better.  She says her whole body hurts and this has been worse.     There was a concern for UTI at her appt 2 days ago.  cx shows mixed kedar.  She feels the same since she has been on the abx.     The following portions of the patient's history were reviewed and updated as appropriate: allergies, current medications, past family history, past medical history, past social history, past surgical history and problem list.    Allergies: Lisinopril; Morphine and related; Baclofen; Codeine; Morphine; and Penicillins    Review of Systems   Constitutional: Positive for fatigue.        Feels bad all over.    HENT: Negative.    Eyes: Negative.    Respiratory: Negative.    Cardiovascular: Positive for chest pain.   Gastrointestinal: Negative.    Endocrine: Negative.    Genitourinary: Negative.    Musculoskeletal: Negative.    Skin: Negative.    Allergic/Immunologic: Negative.    Neurological: Negative.    Hematological: Negative.    Psychiatric/Behavioral: Negative.    All other systems reviewed and are negative.            Wt Readings from Last 3 Encounters:    04/13/18 95.3 kg (210 lb)   04/12/18 93.9 kg (207 lb)   02/21/18 96.2 kg (212 lb)     Temp Readings from Last 3 Encounters:   04/13/18 98.1 °F (36.7 °C) (Oral)   03/16/18 98.2 °F (36.8 °C)   02/21/18 98.6 °F (37 °C)     BP Readings from Last 3 Encounters:   04/18/18 122/80   04/13/18 150/76   03/16/18 126/80     Pulse Readings from Last 3 Encounters:   04/18/18 64   04/13/18 81   03/16/18 84     There is no height or weight on file to calculate BMI.  @LASTSAO2(3)@    Physical Exam   Constitutional: She is oriented to person, place, and time. She appears well-developed and well-nourished. No distress.   HENT:   Head: Normocephalic and atraumatic.   Right Ear: External ear normal.   Left Ear: External ear normal.   Nose: Nose normal.   Mouth/Throat: Oropharynx is clear and moist.   Eyes: Conjunctivae and EOM are normal. Pupils are equal, round, and reactive to light.   Neck: Normal range of motion. Neck supple.   Cardiovascular: Normal rate, regular rhythm, normal heart sounds and intact distal pulses.    Pulmonary/Chest: Effort normal and breath sounds normal. No respiratory distress. She has no wheezes.   Musculoskeletal: Normal range of motion.   In wheelchair   Neurological: She is alert and oriented to person, place, and time.   Skin: Skin is warm and dry.   Psychiatric: She has a normal mood and affect. Her behavior is normal. Judgment and thought content normal.   Nursing note and vitals reviewed.      Results for orders placed or performed in visit on 04/13/18   Urine Culture - Urine, Urine, Clean Catch   Result Value Ref Range    Urine Culture Final report     Result 1 Comment    POCT urinalysis dipstick, manual   Result Value Ref Range    Color Yellow Yellow, Straw, Dark Yellow, Mehnaz    Clarity, UA Cloudy (A) Clear    Glucose, UA Negative Negative, 1000 mg/dL (3+) mg/dL    Bilirubin Negative Negative    Ketones, UA Negative Negative    Specific Gravity  1.020 1.005 - 1.030    Blood, UA Negative Negative     pH, Urine 5.5 5.0 - 8.0    Protein, POC Negative Negative mg/dL    Urobilinogen, UA Normal Normal    Leukocytes Trace (A) Negative    Nitrite, UA Negative Negative           Joya was seen today for follow-up and urinary frequency.    Diagnoses and all orders for this visit:    Type 2 diabetes mellitus with diabetic autonomic neuropathy, with long-term current use of insulin    Atypical chest pain    Complex tear of medial meniscus of left knee as current injury, subsequent encounter    Essential hypertension    Anxiety  -     DULoxetine (CYMBALTA) 30 MG capsule; Take 3 capsules by mouth Daily for 90 days.      Stop keflex as urine cx mixed kedar.     Pt with multiple generalized complaints.  Chest pain is atypical and has had a neg work up about 7 mo ago.  Keep f/u appt with dr. li    Increase tresiba to 85 units nightly to get better glucose control.     Needs f/u with ortho.  pulm cleared pt for surgery.  I need pt to keep glucoses controlled well.  Pt to discuss with cards.     Outpatient Medications Prior to Visit   Medication Sig Dispense Refill   • ACCU-CHEK FASTCLIX LANCETS misc TEST 3-4 TIMES DAILY AS DIRECTED 120 each 10   • ACCU-CHEK SMARTVIEW test strip TEST blood sugar three times daily OR AS DIRECTED 300 each 3   • acetaminophen (TYLENOL) 325 MG tablet Take 2 tablets by mouth Every 6 (Six) Hours As Needed for Mild Pain . OTC product 40 tablet 0   • Alcohol Swabs (B-D SINGLE USE SWABS REGULAR) pads      • aspirin 325 MG tablet Take 325 mg by mouth Daily.     • atorvastatin (LIPITOR) 10 MG tablet Take 10 mg by mouth Every Night.     • Blood Glucose Monitoring Suppl (ACCU-CHEK KENNETH SMARTVIEW) w/Device kit TEST blood sugar three times daily or as directed 1 kit 0   • budesonide (PULMICORT) 0.25 MG/2ML nebulizer solution      • buPROPion SR (WELLBUTRIN SR) 150 MG 12 hr tablet Take 150 mg by mouth Every Night.     • Calcium Carb-Cholecalciferol (CALCIUM PLUS VITAMIN D3 PO) Take 1 tablet by mouth Every  "Morning.     • cephalexin (KEFLEX) 500 MG capsule Take 1 capsule by mouth 3 (Three) Times a Day for 7 days. 21 capsule 0   • colchicine 0.6 MG tablet 2 pills po now then 1 pill po 1 hour later for gout 3 tablet 0   • Cyanocobalamin (VITAMELTS ENERGY VITAMIN B-12) 1500 MCG tablet dispersible Take 1 tablet by mouth Every Morning.     • cyclobenzaprine (FLEXERIL) 10 MG tablet Take 1 tablet by mouth 3 (Three) Times a Day As Needed for Muscle Spasms. 30 tablet 1   • DULoxetine (CYMBALTA) 60 MG capsule Take 60 mg by mouth Every Morning.     • ELIQUIS 2.5 MG tablet tablet Take one (1) tablet orally (by mouth) twice daily 60 tablet 2   • furosemide (LASIX) 20 MG tablet Take 20 mg by mouth Every Morning.     • gabapentin (NEURONTIN) 400 MG capsule Take one (1) capsule orally (by mouth) each morning then one (1) in the afternoon and two (2) each night at bedtime 120 capsule 1   • insulin aspart (NOVOLOG FLEXPEN) 100 UNIT/ML solution pen-injector sc pen 20 units sq before breakfast then 25 units sq before lunch and dinner (Patient taking differently: 3 (Three) Times a Day With Meals. 20 units sq before breakfast then 25 units sq before lunch and dinner) 15 mL 6   • Insulin Pen Needle (ULTICARE MINI PEN NEEDLES) 31G X 6 MM misc To check blood sugar once daily 100 each 3   • levothyroxine (SYNTHROID, LEVOTHROID) 75 MCG tablet Take 75 mcg by mouth Every Morning.     • melatonin 5 MG tablet tablet Take 5 mg by mouth At Night As Needed.     • metoprolol tartrate (LOPRESSOR) 12.5 MG half tablet Take 12.5 mg by mouth 2 (Two) Times a Day.     • Needle, Disp, (BD DISP NEEDLES) 30G X 1/2\" misc To be used 3 times daily with Novolog Flexpen. 100 each 5   • nystatin (MYCOSTATIN) 518283 UNIT/GM powder Apply  topically 3 (Three) Times a Day. 56.7 g 1   • O2 (OXYGEN) Inhale 2 L/min Every Night.     • omeprazole (priLOSEC) 40 MG capsule Take 40 mg by mouth Every Morning.     • ORTHOVISC 30 MG/2ML solution prefilled syringe injection      • " potassium chloride (K-DUR) 10 MEQ CR tablet Take 10 mEq by mouth Every Morning.     • thiamine (VITAMIN B-1) 100 MG tablet Take 100 mg by mouth Daily.     • TRESIBA FLEXTOUCH 200 UNIT/ML solution pen-injector Inject 80 Units under the skin Every Night.     • TRESIBA FLEXTOUCH 200 UNIT/ML solution pen-injector Inject eighty (80) UNITS subcutaneously once daily 9 mL 5   • ULTICARE MICRO PEN NEEDLES 32G X 4 MM misc      • ULTICARE PEN NEEDLES 29G X 12MM misc        No facility-administered medications prior to visit.      New Medications Ordered This Visit   Medications   • DULoxetine (CYMBALTA) 30 MG capsule     Sig: Take 3 capsules by mouth Daily for 90 days.     Dispense:  270 capsule     Refill:  1     [unfilled]  There are no discontinued medications.      Return in about 4 weeks (around 5/16/2018) for Recheck, labs.

## 2018-04-23 RX ORDER — OMEPRAZOLE 40 MG/1
CAPSULE, DELAYED RELEASE ORAL
Qty: 90 CAPSULE | Refills: 1 | Status: SHIPPED | OUTPATIENT
Start: 2018-04-23 | End: 2018-11-30 | Stop reason: SDUPTHER

## 2018-05-03 RX ORDER — GABAPENTIN 400 MG/1
CAPSULE ORAL
Qty: 120 CAPSULE | Refills: 5 | Status: SHIPPED | OUTPATIENT
Start: 2018-05-03 | End: 2018-10-30 | Stop reason: HOSPADM

## 2018-05-10 DIAGNOSIS — E11.43 TYPE 2 DIABETES MELLITUS WITH AUTONOMIC NEUROPATHY (HCC): ICD-10-CM

## 2018-05-10 DIAGNOSIS — E10.9 BRITTLE DIABETES MELLITUS (HCC): ICD-10-CM

## 2018-05-10 RX ORDER — INSULIN DEGLUDEC 200 U/ML
INJECTION, SOLUTION SUBCUTANEOUS
Qty: 9 ML | Refills: 0 | Status: SHIPPED | OUTPATIENT
Start: 2018-05-10 | End: 2018-05-16 | Stop reason: SDUPTHER

## 2018-05-10 RX ORDER — CYCLOBENZAPRINE HCL 10 MG
TABLET ORAL
Qty: 30 TABLET | Refills: 0 | Status: SHIPPED | OUTPATIENT
Start: 2018-05-10 | End: 2018-05-29 | Stop reason: SDUPTHER

## 2018-05-16 ENCOUNTER — OFFICE VISIT (OUTPATIENT)
Dept: INTERNAL MEDICINE | Facility: CLINIC | Age: 78
End: 2018-05-16

## 2018-05-16 VITALS
SYSTOLIC BLOOD PRESSURE: 114 MMHG | OXYGEN SATURATION: 91 % | HEIGHT: 63 IN | HEART RATE: 61 BPM | DIASTOLIC BLOOD PRESSURE: 80 MMHG

## 2018-05-16 DIAGNOSIS — E11.22 CKD STAGE 3 DUE TO TYPE 2 DIABETES MELLITUS (HCC): ICD-10-CM

## 2018-05-16 DIAGNOSIS — N18.30 CKD STAGE 3 DUE TO TYPE 2 DIABETES MELLITUS (HCC): ICD-10-CM

## 2018-05-16 DIAGNOSIS — E10.9 BRITTLE DIABETES MELLITUS (HCC): ICD-10-CM

## 2018-05-16 DIAGNOSIS — R42 DIZZINESS: Primary | ICD-10-CM

## 2018-05-16 DIAGNOSIS — I10 ESSENTIAL HYPERTENSION: ICD-10-CM

## 2018-05-16 DIAGNOSIS — E11.43 TYPE 2 DIABETES MELLITUS WITH DIABETIC AUTONOMIC NEUROPATHY, WITH LONG-TERM CURRENT USE OF INSULIN (HCC): ICD-10-CM

## 2018-05-16 DIAGNOSIS — Z79.4 TYPE 2 DIABETES MELLITUS WITH DIABETIC AUTONOMIC NEUROPATHY, WITH LONG-TERM CURRENT USE OF INSULIN (HCC): ICD-10-CM

## 2018-05-16 DIAGNOSIS — E78.2 MIXED HYPERLIPIDEMIA: ICD-10-CM

## 2018-05-16 PROCEDURE — 99214 OFFICE O/P EST MOD 30 MIN: CPT | Performed by: INTERNAL MEDICINE

## 2018-05-16 NOTE — PROGRESS NOTES
Joya Singh is a 78 y.o. female, who presents with a chief complaint of   Chief Complaint   Patient presents with   • Dizziness     X4days.       HPI   The pt is here bc of dizziness.  This has been a chronic issue for her but it is has been worse for a few days.  She says nothing has changed.  She says her sugars have been lower.  She is currently on tresiba 85 units nightly and novolog 20, 25, 25 with meals.  If glucoses low, she skips the novolog.  She says she skips her novolog 1-2 times a day.  She denies low bp's.  She has constant dizziness no matter what she does.  No change with changing position.  Furosemide is on her list but she doesn't know if she is taking it or not.  Ct head did not have any acute findings in January of 2017. She also has a hx of anemia with last hgb 9.8.   No syncope or LOC.       The following portions of the patient's history were reviewed and updated as appropriate: allergies, current medications, past family history, past medical history, past social history, past surgical history and problem list.    Allergies: Lisinopril; Morphine and related; Baclofen; Codeine; Morphine; and Penicillins    Review of Systems   Constitutional: Negative.    HENT: Negative.    Eyes: Negative.    Respiratory: Negative.    Cardiovascular: Negative.    Gastrointestinal: Negative.    Endocrine: Negative.    Genitourinary: Negative.    Musculoskeletal: Negative.    Skin: Negative.    Allergic/Immunologic: Negative.    Neurological: Positive for dizziness.   Hematological: Negative.    Psychiatric/Behavioral: Negative.    All other systems reviewed and are negative.            Wt Readings from Last 3 Encounters:   04/13/18 95.3 kg (210 lb)   04/12/18 93.9 kg (207 lb)   02/21/18 96.2 kg (212 lb)     Temp Readings from Last 3 Encounters:   04/13/18 98.1 °F (36.7 °C) (Oral)   03/16/18 98.2 °F (36.8 °C)   02/21/18 98.6 °F (37 °C)     BP Readings from Last 3 Encounters:   05/16/18 114/80   04/18/18  122/80   04/13/18 150/76     Pulse Readings from Last 3 Encounters:   05/16/18 61   04/18/18 64   04/13/18 81     There is no height or weight on file to calculate BMI.  @LASTSAO2(3)@    Physical Exam   Constitutional: She is oriented to person, place, and time. She appears well-developed and well-nourished. No distress.   HENT:   Head: Normocephalic and atraumatic.   Right Ear: External ear normal.   Left Ear: External ear normal.   Nose: Nose normal.   Mouth/Throat: Oropharynx is clear and moist.   Eyes: Conjunctivae and EOM are normal. Pupils are equal, round, and reactive to light.   Neck: Normal range of motion. Neck supple.   Cardiovascular: Normal rate, regular rhythm, normal heart sounds and intact distal pulses.    Pulmonary/Chest: Effort normal and breath sounds normal. No respiratory distress. She has no wheezes.   Musculoskeletal: Normal range of motion.   Normal gait   Neurological: She is alert and oriented to person, place, and time.   Skin: Skin is warm and dry.   Psychiatric: She has a normal mood and affect. Her behavior is normal. Judgment and thought content normal.   Nursing note and vitals reviewed.      Results for orders placed or performed in visit on 04/13/18   Urine Culture - Urine, Urine, Clean Catch   Result Value Ref Range    Urine Culture Final report     Result 1 Comment    POCT urinalysis dipstick, manual   Result Value Ref Range    Color Yellow Yellow, Straw, Dark Yellow, Mehnaz    Clarity, UA Cloudy (A) Clear    Glucose, UA Negative Negative, 1000 mg/dL (3+) mg/dL    Bilirubin Negative Negative    Ketones, UA Negative Negative    Specific Gravity  1.020 1.005 - 1.030    Blood, UA Negative Negative    pH, Urine 5.5 5.0 - 8.0    Protein, POC Negative Negative mg/dL    Urobilinogen, UA Normal Normal    Leukocytes Trace (A) Negative    Nitrite, UA Negative Negative           Joya was seen today for dizziness.    Diagnoses and all orders for this visit:    Dizziness  -     Comprehensive  Metabolic Panel; Future  -     CBC & Differential; Future  -     T4, Free; Future  -     TSH; Future  -     NMR LipoProfile; Future  -     Hemoglobin A1c; Future  -     MRI brain w wo contrast; Future  -     Ambulatory Referral to Neurology    Mixed hyperlipidemia  -     Comprehensive Metabolic Panel; Future  -     NMR LipoProfile; Future    Essential hypertension  -     Comprehensive Metabolic Panel; Future  -     CBC & Differential; Future  -     T4, Free; Future  -     TSH; Future  -     NMR LipoProfile; Future  -     Hemoglobin A1c; Future    Type 2 diabetes mellitus with diabetic autonomic neuropathy, with long-term current use of insulin  -     Comprehensive Metabolic Panel; Future  -     CBC & Differential; Future  -     T4, Free; Future  -     TSH; Future  -     NMR LipoProfile; Future  -     Hemoglobin A1c; Future    CKD stage 3 due to type 2 diabetes mellitus  -     Comprehensive Metabolic Panel; Future    Brittle diabetes mellitus  -     Insulin Degludec (TRESIBA FLEXTOUCH) 200 UNIT/ML solution pen-injector; Inject 75 Units under the skin Daily.      Decrease tresiba from 85 to 75 units.  Keep close eye on bp's as hypotension can cause dizziness as well. Stop lasix.    F/u 1 week. She is supposed to leave for [place] next Friday-Monday    Outpatient Medications Prior to Visit   Medication Sig Dispense Refill   • ACCU-CHEK FASTCLIX LANCETS misc TEST 3-4 TIMES DAILY AS DIRECTED 120 each 10   • ACCU-CHEK SMARTVIEW test strip TEST blood sugar three times daily OR AS DIRECTED 300 each 3   • acetaminophen (TYLENOL) 325 MG tablet Take 2 tablets by mouth Every 6 (Six) Hours As Needed for Mild Pain . OTC product 40 tablet 0   • Alcohol Swabs (B-D SINGLE USE SWABS REGULAR) pads      • aspirin 325 MG tablet Take 325 mg by mouth Daily.     • atorvastatin (LIPITOR) 10 MG tablet Take 10 mg by mouth Every Night.     • Blood Glucose Monitoring Suppl (ACCU-CHEK KENNETH SMARTVIEW) w/Device kit TEST blood sugar three times  "daily or as directed 1 kit 0   • budesonide (PULMICORT) 0.25 MG/2ML nebulizer solution      • buPROPion SR (WELLBUTRIN SR) 150 MG 12 hr tablet Take 150 mg by mouth Every Night.     • Calcium Carb-Cholecalciferol (CALCIUM PLUS VITAMIN D3 PO) Take 1 tablet by mouth Every Morning.     • colchicine 0.6 MG tablet 2 pills po now then 1 pill po 1 hour later for gout 3 tablet 0   • Cyanocobalamin (VITAMELTS ENERGY VITAMIN B-12) 1500 MCG tablet dispersible Take 1 tablet by mouth Every Morning.     • cyclobenzaprine (FLEXERIL) 10 MG tablet Take one (1) tablet orally (by mouth) three times daily as needed formuscle spasms 30 tablet 0   • DULoxetine (CYMBALTA) 30 MG capsule Take 3 capsules by mouth Daily for 90 days. 270 capsule 1   • ELIQUIS 2.5 MG tablet tablet Take one (1) tablet orally (by mouth) twice daily 60 tablet 2   • gabapentin (NEURONTIN) 400 MG capsule Take one (1) capsule orally (by mouth) each morning then one (1) in the afternoon and two (2) each night at bedtime 120 capsule 5   • insulin aspart (NOVOLOG FLEXPEN) 100 UNIT/ML solution pen-injector sc pen 20 units sq before breakfast then 25 units sq before lunch and dinner (Patient taking differently: 3 (Three) Times a Day With Meals. 20 units sq before breakfast then 25 units sq before lunch and dinner) 15 mL 6   • Insulin Pen Needle (ULTICARE MINI PEN NEEDLES) 31G X 6 MM misc To check blood sugar once daily 100 each 3   • levothyroxine (SYNTHROID, LEVOTHROID) 75 MCG tablet Take 75 mcg by mouth Every Morning.     • melatonin 5 MG tablet tablet Take 5 mg by mouth At Night As Needed.     • metoprolol tartrate (LOPRESSOR) 12.5 MG half tablet Take 12.5 mg by mouth 2 (Two) Times a Day.     • Needle, Disp, (BD DISP NEEDLES) 30G X 1/2\" misc To be used 3 times daily with Novolog Flexpen. 100 each 5   • nystatin (MYCOSTATIN) 459061 UNIT/GM powder Apply  topically 3 (Three) Times a Day. 56.7 g 1   • O2 (OXYGEN) Inhale 2 L/min Every Night.     • omeprazole (priLOSEC) 40 MG " capsule Take one (1) capsule orally (by mouth) once daily 90 capsule 1   • ORTHOVISC 30 MG/2ML solution prefilled syringe injection      • potassium chloride (K-DUR) 10 MEQ CR tablet Take 10 mEq by mouth Every Morning.     • thiamine (VITAMIN B-1) 100 MG tablet Take 100 mg by mouth Daily.     • ULTICARE MICRO PEN NEEDLES 32G X 4 MM misc      • ULTICARE PEN NEEDLES 29G X 12MM misc      • furosemide (LASIX) 20 MG tablet Take 20 mg by mouth Every Morning.     • TRESIBA FLEXTOUCH 200 UNIT/ML solution pen-injector Inject eighty (80) UNITS subcutaneously once daily 9 mL 0   • DULoxetine (CYMBALTA) 60 MG capsule Take 60 mg by mouth Every Morning.       No facility-administered medications prior to visit.      New Medications Ordered This Visit   Medications   • Insulin Degludec (TRESIBA FLEXTOUCH) 200 UNIT/ML solution pen-injector     Sig: Inject 75 Units under the skin Daily.     Dispense:  9 mL     Refill:  0     [unfilled]  Medications Discontinued During This Encounter   Medication Reason   • DULoxetine (CYMBALTA) 60 MG capsule *Therapy completed   • TRESIBA FLEXTOUCH 200 UNIT/ML solution pen-injector Reorder   • furosemide (LASIX) 20 MG tablet          Return in about 1 week (around 5/23/2018) for Recheck.

## 2018-05-18 LAB
ALBUMIN SERPL-MCNC: 4 G/DL (ref 3.5–5.2)
ALBUMIN/GLOB SERPL: 1.5 G/DL
ALP SERPL-CCNC: 129 U/L (ref 40–129)
ALT SERPL-CCNC: 8 U/L (ref 5–33)
AST SERPL-CCNC: 10 U/L (ref 5–32)
BASOPHILS # BLD AUTO: 0.09 10*3/MM3 (ref 0–0.2)
BASOPHILS NFR BLD AUTO: 1 % (ref 0–2)
BILIRUB SERPL-MCNC: 0.4 MG/DL (ref 0.2–1.2)
BUN SERPL-MCNC: 30 MG/DL (ref 8–23)
BUN/CREAT SERPL: 15.8 (ref 7–25)
CALCIUM SERPL-MCNC: 9.5 MG/DL (ref 8.8–10.5)
CHLORIDE SERPL-SCNC: 104 MMOL/L (ref 98–107)
CHOLEST SERPL-MCNC: 100 MG/DL (ref 100–199)
CO2 SERPL-SCNC: 29.7 MMOL/L (ref 22–29)
CREAT SERPL-MCNC: 1.9 MG/DL (ref 0.57–1)
EOSINOPHIL # BLD AUTO: 0.27 10*3/MM3 (ref 0.1–0.3)
EOSINOPHIL NFR BLD AUTO: 2.9 % (ref 0–4)
ERYTHROCYTE [DISTWIDTH] IN BLOOD BY AUTOMATED COUNT: 18.4 % (ref 11.5–14.5)
GFR SERPLBLD CREATININE-BSD FMLA CKD-EPI: 26 ML/MIN/1.73
GFR SERPLBLD CREATININE-BSD FMLA CKD-EPI: 31 ML/MIN/1.73
GLOBULIN SER CALC-MCNC: 2.6 GM/DL
GLUCOSE SERPL-MCNC: 42 MG/DL (ref 65–99)
HBA1C MFR BLD: 7.1 % (ref 4.8–5.6)
HCT VFR BLD AUTO: 28.3 % (ref 37–47)
HDL SERPL-SCNC: 21.7 UMOL/L
HDLC SERPL-MCNC: 40 MG/DL
HGB BLD-MCNC: 9.1 G/DL (ref 12–16)
IMM GRANULOCYTES # BLD: 0.12 10*3/MM3 (ref 0–0.03)
IMM GRANULOCYTES NFR BLD: 1.3 % (ref 0–0.5)
LDL SERPL QN: 20.3 NM
LDL SERPL-SCNC: 585 NMOL/L
LDL SMALL SERPL-SCNC: 319 NMOL/L
LDLC SERPL CALC-MCNC: 46 MG/DL (ref 0–99)
LYMPHOCYTES # BLD AUTO: 3.18 10*3/MM3 (ref 0.6–4.8)
LYMPHOCYTES NFR BLD AUTO: 34.6 % (ref 20–45)
MCH RBC QN AUTO: 32.9 PG (ref 27–31)
MCHC RBC AUTO-ENTMCNC: 32.2 G/DL (ref 31–37)
MCV RBC AUTO: 102.2 FL (ref 81–99)
MONOCYTES # BLD AUTO: 0.43 10*3/MM3 (ref 0–1)
MONOCYTES NFR BLD AUTO: 4.7 % (ref 3–8)
NEUTROPHILS # BLD AUTO: 5.09 10*3/MM3 (ref 1.5–8.3)
NEUTROPHILS NFR BLD AUTO: 55.5 % (ref 45–70)
NRBC BLD AUTO-RTO: 0 /100 WBC (ref 0–0)
PLATELET # BLD AUTO: 435 10*3/MM3 (ref 140–500)
POTASSIUM SERPL-SCNC: 5.5 MMOL/L (ref 3.5–5.2)
PROT SERPL-MCNC: 6.6 G/DL (ref 6–8.5)
RBC # BLD AUTO: 2.77 10*6/MM3 (ref 4.2–5.4)
SODIUM SERPL-SCNC: 143 MMOL/L (ref 136–145)
T4 FREE SERPL-MCNC: 1.47 NG/DL (ref 0.93–1.7)
TRIGL SERPL-MCNC: 68 MG/DL (ref 0–149)
TSH SERPL DL<=0.005 MIU/L-ACNC: 4.67 MIU/ML (ref 0.27–4.2)
WBC # BLD AUTO: 9.18 10*3/MM3 (ref 4.8–10.8)

## 2018-05-21 ENCOUNTER — APPOINTMENT (OUTPATIENT)
Dept: MRI IMAGING | Facility: HOSPITAL | Age: 78
End: 2018-05-21
Attending: INTERNAL MEDICINE

## 2018-05-21 ENCOUNTER — TELEPHONE (OUTPATIENT)
Dept: INTERNAL MEDICINE | Facility: CLINIC | Age: 78
End: 2018-05-21

## 2018-05-21 NOTE — TELEPHONE ENCOUNTER
Called patient twice, unable to LVM. Has appt to f/u with dr. burnham in 2 days.    ----- Message from Chari Burnham MD sent at 5/18/2018  3:44 PM EDT -----  Call pt about labs.  Stable.  a1c 7.1

## 2018-05-23 ENCOUNTER — OFFICE VISIT (OUTPATIENT)
Dept: INTERNAL MEDICINE | Facility: CLINIC | Age: 78
End: 2018-05-23

## 2018-05-23 VITALS
HEIGHT: 63 IN | HEART RATE: 83 BPM | OXYGEN SATURATION: 90 % | SYSTOLIC BLOOD PRESSURE: 130 MMHG | DIASTOLIC BLOOD PRESSURE: 80 MMHG

## 2018-05-23 DIAGNOSIS — N18.30 CKD STAGE 3 DUE TO TYPE 2 DIABETES MELLITUS (HCC): ICD-10-CM

## 2018-05-23 DIAGNOSIS — I10 ESSENTIAL HYPERTENSION: ICD-10-CM

## 2018-05-23 DIAGNOSIS — E11.43 TYPE 2 DIABETES MELLITUS WITH DIABETIC AUTONOMIC NEUROPATHY, WITH LONG-TERM CURRENT USE OF INSULIN (HCC): Primary | ICD-10-CM

## 2018-05-23 DIAGNOSIS — E87.5 HYPERKALEMIA: ICD-10-CM

## 2018-05-23 DIAGNOSIS — Z79.4 TYPE 2 DIABETES MELLITUS WITH DIABETIC AUTONOMIC NEUROPATHY, WITH LONG-TERM CURRENT USE OF INSULIN (HCC): Primary | ICD-10-CM

## 2018-05-23 DIAGNOSIS — E11.22 CKD STAGE 3 DUE TO TYPE 2 DIABETES MELLITUS (HCC): ICD-10-CM

## 2018-05-23 DIAGNOSIS — D64.9 CHRONIC ANEMIA: ICD-10-CM

## 2018-05-23 LAB
ALBUMIN SERPL-MCNC: 3.8 G/DL (ref 3.5–5.2)
ALBUMIN/GLOB SERPL: 1.5 G/DL
ALP SERPL-CCNC: 126 U/L (ref 40–129)
ALT SERPL-CCNC: 7 U/L (ref 5–33)
AST SERPL-CCNC: 10 U/L (ref 5–32)
BASOPHILS # BLD AUTO: 0.09 10*3/MM3 (ref 0–0.2)
BASOPHILS NFR BLD AUTO: 1.2 % (ref 0–2)
BILIRUB SERPL-MCNC: 0.3 MG/DL (ref 0.2–1.2)
BUN SERPL-MCNC: 24 MG/DL (ref 8–23)
BUN/CREAT SERPL: 16 (ref 7–25)
CALCIUM SERPL-MCNC: 9.3 MG/DL (ref 8.8–10.5)
CHLORIDE SERPL-SCNC: 104 MMOL/L (ref 98–107)
CO2 SERPL-SCNC: 26.5 MMOL/L (ref 22–29)
CREAT SERPL-MCNC: 1.5 MG/DL (ref 0.57–1)
EOSINOPHIL # BLD AUTO: 0.31 10*3/MM3 (ref 0.1–0.3)
EOSINOPHIL NFR BLD AUTO: 4.2 % (ref 0–4)
ERYTHROCYTE [DISTWIDTH] IN BLOOD BY AUTOMATED COUNT: 18.2 % (ref 11.5–14.5)
GFR SERPLBLD CREATININE-BSD FMLA CKD-EPI: 34 ML/MIN/1.73
GFR SERPLBLD CREATININE-BSD FMLA CKD-EPI: 41 ML/MIN/1.73
GLOBULIN SER CALC-MCNC: 2.5 GM/DL
GLUCOSE SERPL-MCNC: 69 MG/DL (ref 65–99)
HCT VFR BLD AUTO: 28.8 % (ref 37–47)
HGB BLD-MCNC: 9.1 G/DL (ref 12–16)
IMM GRANULOCYTES # BLD: 0.03 10*3/MM3 (ref 0–0.03)
IMM GRANULOCYTES NFR BLD: 0.4 % (ref 0–0.5)
LYMPHOCYTES # BLD AUTO: 2.21 10*3/MM3 (ref 0.6–4.8)
LYMPHOCYTES NFR BLD AUTO: 29.6 % (ref 20–45)
MCH RBC QN AUTO: 32 PG (ref 27–31)
MCHC RBC AUTO-ENTMCNC: 31.6 G/DL (ref 31–37)
MCV RBC AUTO: 101.4 FL (ref 81–99)
MONOCYTES # BLD AUTO: 0.37 10*3/MM3 (ref 0–1)
MONOCYTES NFR BLD AUTO: 5 % (ref 3–8)
NEUTROPHILS # BLD AUTO: 4.45 10*3/MM3 (ref 1.5–8.3)
NEUTROPHILS NFR BLD AUTO: 59.6 % (ref 45–70)
NRBC BLD AUTO-RTO: 0 /100 WBC (ref 0–0)
PLATELET # BLD AUTO: 362 10*3/MM3 (ref 140–500)
POTASSIUM SERPL-SCNC: 4.8 MMOL/L (ref 3.5–5.2)
PROT SERPL-MCNC: 6.3 G/DL (ref 6–8.5)
RBC # BLD AUTO: 2.84 10*6/MM3 (ref 4.2–5.4)
SODIUM SERPL-SCNC: 144 MMOL/L (ref 136–145)
WBC # BLD AUTO: 7.46 10*3/MM3 (ref 4.8–10.8)

## 2018-05-23 PROCEDURE — 99214 OFFICE O/P EST MOD 30 MIN: CPT | Performed by: INTERNAL MEDICINE

## 2018-05-23 RX ORDER — POTASSIUM CHLORIDE 750 MG/1
TABLET, FILM COATED, EXTENDED RELEASE ORAL
Qty: 90 TABLET | Refills: 1 | Status: SHIPPED | OUTPATIENT
Start: 2018-05-23 | End: 2018-05-23

## 2018-05-23 NOTE — PROGRESS NOTES
Joya Singh is a 78 y.o. female, who presents with a chief complaint of   Chief Complaint   Patient presents with   • Follow-up     1 week follow up.   • Med Refill     Needs refill on potassium and new RX for diabetic shoes.       HPI   The pt is here for follow up. She had a MRI scheduled but she had to reschedule for next week.  Neurology consult is 6/5/18.  She cont to have dizziness malick when standing and lots of generalized weakness.  She is in a wheelchair in the office but uses a walker at home.  The pt says even just sitting she can be dizzy.    Pt requests a refill on her potassium but her potassium was high last time.  I have advised her that we need to recheck labs before I have her take more.  She is currently out of her potassium.     Dm - a1c 7.1 which is much better for the pt.  She has previously stayed in the 8-9 range. If she has low glucoses they are usually low in the am.      htn- currently well controlled.        The following portions of the patient's history were reviewed and updated as appropriate: allergies, current medications, past family history, past medical history, past social history, past surgical history and problem list.    Allergies: Lisinopril; Morphine and related; Baclofen; Codeine; Morphine; and Penicillins    Review of Systems   HENT: Negative.    Eyes: Negative.    Respiratory: Negative.    Cardiovascular: Negative.    Gastrointestinal: Negative.    Endocrine: Negative.    Genitourinary: Negative.    Musculoskeletal: Negative.    Skin: Negative.    Allergic/Immunologic: Negative.    Neurological: Positive for dizziness and weakness.   Hematological: Negative.    Psychiatric/Behavioral: Negative.    All other systems reviewed and are negative.            Wt Readings from Last 3 Encounters:   04/13/18 95.3 kg (210 lb)   04/12/18 93.9 kg (207 lb)   02/21/18 96.2 kg (212 lb)     Temp Readings from Last 3 Encounters:   04/13/18 98.1 °F (36.7 °C) (Oral)   03/16/18 98.2 °F  (36.8 °C)   02/21/18 98.6 °F (37 °C)     BP Readings from Last 3 Encounters:   05/23/18 130/80   05/16/18 114/80   04/18/18 122/80     Pulse Readings from Last 3 Encounters:   05/23/18 83   05/16/18 61   04/18/18 64     There is no height or weight on file to calculate BMI.  @LASTSAO2(3)@    Physical Exam   Constitutional: She is oriented to person, place, and time. She appears well-developed and well-nourished. No distress.   HENT:   Head: Normocephalic and atraumatic.   Right Ear: External ear normal.   Left Ear: External ear normal.   Nose: Nose normal.   Mouth/Throat: Oropharynx is clear and moist.   Eyes: Conjunctivae and EOM are normal. Pupils are equal, round, and reactive to light.   Neck: Normal range of motion. Neck supple.   Cardiovascular: Normal rate, regular rhythm, normal heart sounds and intact distal pulses.    Pulmonary/Chest: Effort normal and breath sounds normal. No respiratory distress. She has no wheezes.   Musculoskeletal: Normal range of motion.   Normal gait   Neurological: She is alert and oriented to person, place, and time.   Strength 4/5 in bilat LE   Skin: Skin is warm and dry.   Psychiatric: She has a normal mood and affect. Her behavior is normal. Judgment and thought content normal.   Nursing note and vitals reviewed.      Results for orders placed or performed in visit on 05/16/18   Comprehensive Metabolic Panel   Result Value Ref Range    Glucose 42 (C) 65 - 99 mg/dL    BUN 30 (H) 8 - 23 mg/dL    Creatinine 1.90 (H) 0.57 - 1.00 mg/dL    eGFR Non African Am 26 (L) >60 mL/min/1.73    eGFR African Am 31 (L) >60 mL/min/1.73    BUN/Creatinine Ratio 15.8 7.0 - 25.0    Sodium 143 136 - 145 mmol/L    Potassium 5.5 (H) 3.5 - 5.2 mmol/L    Chloride 104 98 - 107 mmol/L    Total CO2 29.7 (H) 22.0 - 29.0 mmol/L    Calcium 9.5 8.8 - 10.5 mg/dL    Total Protein 6.6 6.0 - 8.5 g/dL    Albumin 4.00 3.50 - 5.20 g/dL    Globulin 2.6 gm/dL    A/G Ratio 1.5 g/dL    Total Bilirubin 0.4 0.2 - 1.2 mg/dL     Alkaline Phosphatase 129 40 - 129 U/L    AST (SGOT) 10 5 - 32 U/L    ALT (SGPT) 8 5 - 33 U/L   T4, Free   Result Value Ref Range    Free T4 1.47 0.93 - 1.70 ng/dL   TSH   Result Value Ref Range    TSH 4.670 (H) 0.270 - 4.200 mIU/mL   NMR LipoProfile   Result Value Ref Range    LDL-P 585 <1,000 nmol/L    LDL-C 46 0 - 99 mg/dL    HDL-C 40 >39 mg/dL    Triglycerides 68 0 - 149 mg/dL    Total Cholesterol 100 100 - 199 mg/dL    HDL-P (Total) 21.7 (L) >=30.5 umol/L    Small LDL-P 319 <=527 nmol/L    LDL Size 20.3 >20.5 nm   Hemoglobin A1c   Result Value Ref Range    Hemoglobin A1C 7.10 (H) 4.80 - 5.60 %   CBC & Differential   Result Value Ref Range    WBC 9.18 4.80 - 10.80 10*3/mm3    RBC 2.77 (L) 4.20 - 5.40 10*6/mm3    Hemoglobin 9.1 (L) 12.0 - 16.0 g/dL    Hematocrit 28.3 (L) 37.0 - 47.0 %    .2 (H) 81.0 - 99.0 fL    MCH 32.9 (H) 27.0 - 31.0 pg    MCHC 32.2 31.0 - 37.0 g/dL    RDW 18.4 (H) 11.5 - 14.5 %    Platelets 435 140 - 500 10*3/mm3    Neutrophil Rel % 55.5 45.0 - 70.0 %    Lymphocyte Rel % 34.6 20.0 - 45.0 %    Monocyte Rel % 4.7 3.0 - 8.0 %    Eosinophil Rel % 2.9 0.0 - 4.0 %    Basophil Rel % 1.0 0.0 - 2.0 %    Neutrophils Absolute 5.09 1.50 - 8.30 10*3/mm3    Lymphocytes Absolute 3.18 0.60 - 4.80 10*3/mm3    Monocytes Absolute 0.43 0.00 - 1.00 10*3/mm3    Eosinophils Absolute 0.27 0.10 - 0.30 10*3/mm3    Basophils Absolute 0.09 0.00 - 0.20 10*3/mm3    Immature Granulocyte Rel % 1.3 (H) 0.0 - 0.5 %    Immature Grans Absolute 0.12 (H) 0.00 - 0.03 10*3/mm3    nRBC 0.0 0.0 - 0.0 /100 WBC           Joya was seen today for follow-up and med refill.    Diagnoses and all orders for this visit:    Type 2 diabetes mellitus with diabetic autonomic neuropathy, with long-term current use of insulin  -     [employer]    CKD stage 3 due to type 2 diabetes mellitus  -     Comprehensive Metabolic Panel; Future    Chronic anemia  -     CBC & Differential; Future    Essential hypertension  -      Comprehensive Metabolic Panel; Future  -     CBC & Differential; Future    Hyperkalemia  -     Comprehensive Metabolic Panel; Future      Cont current meds. Needs mri and neurology eval.      Outpatient Medications Prior to Visit   Medication Sig Dispense Refill   • ACCU-CHEK FASTCLIX LANCETS misc TEST 3-4 TIMES DAILY AS DIRECTED 120 each 10   • ACCU-CHEK SMARTVIEW test strip TEST blood sugar three times daily OR AS DIRECTED 300 each 3   • acetaminophen (TYLENOL) 325 MG tablet Take 2 tablets by mouth Every 6 (Six) Hours As Needed for Mild Pain . OTC product 40 tablet 0   • Alcohol Swabs (B-D SINGLE USE SWABS REGULAR) pads      • aspirin 325 MG tablet Take 325 mg by mouth Daily.     • atorvastatin (LIPITOR) 10 MG tablet Take 10 mg by mouth Every Night.     • Blood Glucose Monitoring Suppl (ACCU-CHEK KENNETH SMARTVIEW) w/Device kit TEST blood sugar three times daily or as directed 1 kit 0   • budesonide (PULMICORT) 0.25 MG/2ML nebulizer solution      • buPROPion SR (WELLBUTRIN SR) 150 MG 12 hr tablet Take 150 mg by mouth Every Night.     • Calcium Carb-Cholecalciferol (CALCIUM PLUS VITAMIN D3 PO) Take 1 tablet by mouth Every Morning.     • colchicine 0.6 MG tablet 2 pills po now then 1 pill po 1 hour later for gout 3 tablet 0   • Cyanocobalamin (VITAMELTS ENERGY VITAMIN B-12) 1500 MCG tablet dispersible Take 1 tablet by mouth Every Morning.     • cyclobenzaprine (FLEXERIL) 10 MG tablet Take one (1) tablet orally (by mouth) three times daily as needed formuscle spasms 30 tablet 0   • DULoxetine (CYMBALTA) 30 MG capsule Take 3 capsules by mouth Daily for 90 days. 270 capsule 1   • ELIQUIS 2.5 MG tablet tablet Take one (1) tablet orally (by mouth) twice daily 60 tablet 2   • gabapentin (NEURONTIN) 400 MG capsule Take one (1) capsule orally (by mouth) each morning then one (1) in the afternoon and two (2) each night at bedtime 120 capsule 5   • insulin aspart (NOVOLOG FLEXPEN) 100 UNIT/ML solution pen-injector sc pen 20  "units sq before breakfast then 25 units sq before lunch and dinner (Patient taking differently: 3 (Three) Times a Day With Meals. 20 units sq before breakfast then 25 units sq before lunch and dinner) 15 mL 6   • Insulin Degludec (TRESIBA FLEXTOUCH) 200 UNIT/ML solution pen-injector Inject 75 Units under the skin Daily. 9 mL 0   • Insulin Pen Needle (ULTICARE MINI PEN NEEDLES) 31G X 6 MM misc To check blood sugar once daily 100 each 3   • levothyroxine (SYNTHROID, LEVOTHROID) 75 MCG tablet Take 75 mcg by mouth Every Morning.     • melatonin 5 MG tablet tablet Take 5 mg by mouth At Night As Needed.     • metoprolol tartrate (LOPRESSOR) 12.5 MG half tablet Take 12.5 mg by mouth 2 (Two) Times a Day.     • Needle, Disp, (BD DISP NEEDLES) 30G X 1/2\" misc To be used 3 times daily with Novolog Flexpen. 100 each 5   • nystatin (MYCOSTATIN) 623591 UNIT/GM powder Apply  topically 3 (Three) Times a Day. 56.7 g 1   • O2 (OXYGEN) Inhale 2 L/min Every Night.     • omeprazole (priLOSEC) 40 MG capsule Take one (1) capsule orally (by mouth) once daily 90 capsule 1   • ORTHOVISC 30 MG/2ML solution prefilled syringe injection      • thiamine (VITAMIN B-1) 100 MG tablet Take 100 mg by mouth Daily.     • ULTICARE MICRO PEN NEEDLES 32G X 4 MM misc      • ULTICARE PEN NEEDLES 29G X 12MM misc      • potassium chloride (K-DUR) 10 MEQ CR tablet Take 10 mEq by mouth Every Morning.     • potassium chloride (K-DUR) 10 MEQ CR tablet Take one (1) tablet orally (by mouth) once daily 90 tablet 1     No facility-administered medications prior to visit.      No orders of the defined types were placed in this encounter.    [unfilled]  Medications Discontinued During This Encounter   Medication Reason   • potassium chloride (K-DUR) 10 MEQ CR tablet    • potassium chloride (K-DUR) 10 MEQ CR tablet          Return in about 2 weeks (around 6/6/2018).  "

## 2018-05-25 DIAGNOSIS — E10.9 BRITTLE DIABETES MELLITUS (HCC): ICD-10-CM

## 2018-05-25 DIAGNOSIS — E11.43 TYPE 2 DIABETES MELLITUS WITH AUTONOMIC NEUROPATHY (HCC): ICD-10-CM

## 2018-05-25 RX ORDER — INSULIN DEGLUDEC 200 U/ML
INJECTION, SOLUTION SUBCUTANEOUS
Qty: 9 ML | Refills: 11 | Status: SHIPPED | OUTPATIENT
Start: 2018-05-25 | End: 2018-07-06 | Stop reason: SDUPTHER

## 2018-05-29 ENCOUNTER — HOSPITAL ENCOUNTER (OUTPATIENT)
Dept: MRI IMAGING | Facility: HOSPITAL | Age: 78
End: 2018-05-29
Attending: INTERNAL MEDICINE

## 2018-05-29 RX ORDER — CYCLOBENZAPRINE HCL 10 MG
TABLET ORAL
Qty: 30 TABLET | Refills: 3 | Status: SHIPPED | OUTPATIENT
Start: 2018-05-29 | End: 2018-08-13 | Stop reason: SDUPTHER

## 2018-05-30 ENCOUNTER — APPOINTMENT (OUTPATIENT)
Dept: MRI IMAGING | Facility: HOSPITAL | Age: 78
End: 2018-05-30
Attending: INTERNAL MEDICINE

## 2018-06-05 ENCOUNTER — HOSPITAL ENCOUNTER (OUTPATIENT)
Dept: MRI IMAGING | Facility: HOSPITAL | Age: 78
Discharge: HOME | End: 2018-06-05
Attending: INTERNAL MEDICINE | Admitting: INTERNAL MEDICINE

## 2018-06-05 DIAGNOSIS — R42 DIZZINESS: ICD-10-CM

## 2018-06-05 PROCEDURE — 70551 MRI BRAIN STEM W/O DYE: CPT

## 2018-06-11 ENCOUNTER — OFFICE VISIT (OUTPATIENT)
Dept: INTERNAL MEDICINE | Facility: CLINIC | Age: 78
End: 2018-06-11

## 2018-06-11 VITALS
HEIGHT: 63 IN | HEART RATE: 77 BPM | SYSTOLIC BLOOD PRESSURE: 118 MMHG | OXYGEN SATURATION: 95 % | DIASTOLIC BLOOD PRESSURE: 82 MMHG

## 2018-06-11 DIAGNOSIS — E11.43 TYPE 2 DIABETES MELLITUS WITH DIABETIC AUTONOMIC NEUROPATHY, WITH LONG-TERM CURRENT USE OF INSULIN (HCC): ICD-10-CM

## 2018-06-11 DIAGNOSIS — I10 ESSENTIAL HYPERTENSION: Primary | ICD-10-CM

## 2018-06-11 DIAGNOSIS — G89.29 CHRONIC BACK PAIN, UNSPECIFIED BACK LOCATION, UNSPECIFIED BACK PAIN LATERALITY: ICD-10-CM

## 2018-06-11 DIAGNOSIS — M54.9 CHRONIC BACK PAIN, UNSPECIFIED BACK LOCATION, UNSPECIFIED BACK PAIN LATERALITY: ICD-10-CM

## 2018-06-11 DIAGNOSIS — M25.511 BILATERAL SHOULDER PAIN, UNSPECIFIED CHRONICITY: ICD-10-CM

## 2018-06-11 DIAGNOSIS — M25.512 BILATERAL SHOULDER PAIN, UNSPECIFIED CHRONICITY: ICD-10-CM

## 2018-06-11 DIAGNOSIS — I95.1 ORTHOSTATIC HYPOTENSION: ICD-10-CM

## 2018-06-11 DIAGNOSIS — Z79.4 TYPE 2 DIABETES MELLITUS WITH DIABETIC AUTONOMIC NEUROPATHY, WITH LONG-TERM CURRENT USE OF INSULIN (HCC): ICD-10-CM

## 2018-06-11 PROCEDURE — 99214 OFFICE O/P EST MOD 30 MIN: CPT | Performed by: INTERNAL MEDICINE

## 2018-06-11 NOTE — PATIENT INSTRUCTIONS
Stop metoprolol    Schedule follow up with dr. Sanon    Rest, ice/heat, tylenol for shoulder    Ordering home health for bp monitoring, diabetes education, and knee/shoulder pain.  Nursing and physical therapy ordered.

## 2018-06-11 NOTE — PROGRESS NOTES
Joya Singh is a 78 y.o. female, who presents with a chief complaint of   Chief Complaint   Patient presents with   • Follow-up     Nurse from [facility] brought patient 2500mcg of sublingual Vitamin B12 and Centrum Silver, she has been taking them for about two weeks.    • Shoulder Pain     R side pain, X3days. She has taken tylenol.    • Neck Pain       HPI   The pt is here with her son for follow up.  She has chronic dizziness that is unchanged.  She saw neurology and neurology had concern for autonomic issues.  Orthostatics normal at neurology.  The pt missed an appt with dr. Sanon in May.  She is due for follow up.     She has chronic knee pain that limits her activity some as well.     She c/o right shoulder pain over the past couple of days.  No known injury or trauma.  Tylenol helped some last night.     Dm- last a1c 7.1.  Due for labs in August.   The pt says her glucoses have been ok.  This am her glucose was 159.      The following portions of the patient's history were reviewed and updated as appropriate: allergies, current medications, past family history, past medical history, past social history, past surgical history and problem list.    Allergies: Lisinopril; Morphine and related; Baclofen; Codeine; Morphine; and Penicillins    Review of Systems   Constitutional: Negative.    HENT: Negative.    Eyes: Negative.    Respiratory: Negative.    Cardiovascular: Negative.    Gastrointestinal: Negative.    Endocrine: Negative.    Genitourinary: Negative.    Musculoskeletal:        Shoulder pain   Skin: Negative.    Allergic/Immunologic: Negative.    Neurological: Positive for dizziness.   Hematological: Negative.    Psychiatric/Behavioral: Negative.    All other systems reviewed and are negative.            Wt Readings from Last 3 Encounters:   06/05/18 95.3 kg (210 lb)   04/13/18 95.3 kg (210 lb)   04/12/18 93.9 kg (207 lb)     Temp Readings from Last 3 Encounters:   04/13/18 98.1 °F (36.7 °C) (Oral)    03/16/18 98.2 °F (36.8 °C)   02/21/18 98.6 °F (37 °C)     BP Readings from Last 3 Encounters:   06/11/18 118/82   05/23/18 130/80   05/16/18 114/80     Pulse Readings from Last 3 Encounters:   06/11/18 77   05/23/18 83   05/16/18 61     There is no height or weight on file to calculate BMI.  @LASTSAO2(3)@    Physical Exam   Constitutional: She is oriented to person, place, and time. She appears well-developed and well-nourished. No distress.   HENT:   Head: Normocephalic and atraumatic.   Right Ear: External ear normal.   Left Ear: External ear normal.   Nose: Nose normal.   Mouth/Throat: Oropharynx is clear and moist.   Eyes: Conjunctivae and EOM are normal. Pupils are equal, round, and reactive to light.   Neck: Normal range of motion. Neck supple.   Cardiovascular: Normal rate, regular rhythm, normal heart sounds and intact distal pulses.    Pulmonary/Chest: Effort normal and breath sounds normal. No respiratory distress. She has no wheezes.   Musculoskeletal: She exhibits no edema.   ttp of right anterior shoulder.   Pt in a wheelchair and has a cane with her   Neurological: She is alert and oriented to person, place, and time.   Skin: Skin is warm and dry.   Psychiatric: She has a normal mood and affect. Her behavior is normal. Judgment and thought content normal.   Nursing note and vitals reviewed.      Results for orders placed or performed in visit on 05/23/18   Comprehensive Metabolic Panel   Result Value Ref Range    Glucose 69 65 - 99 mg/dL    BUN 24 (H) 8 - 23 mg/dL    Creatinine 1.50 (H) 0.57 - 1.00 mg/dL    eGFR Non African Am 34 (L) >60 mL/min/1.73    eGFR  Am 41 (L) >60 mL/min/1.73    BUN/Creatinine Ratio 16.0 7.0 - 25.0    Sodium 144 136 - 145 mmol/L    Potassium 4.8 3.5 - 5.2 mmol/L    Chloride 104 98 - 107 mmol/L    Total CO2 26.5 22.0 - 29.0 mmol/L    Calcium 9.3 8.8 - 10.5 mg/dL    Total Protein 6.3 6.0 - 8.5 g/dL    Albumin 3.80 3.50 - 5.20 g/dL    Globulin 2.5 gm/dL    A/G Ratio 1.5  g/dL    Total Bilirubin 0.3 0.2 - 1.2 mg/dL    Alkaline Phosphatase 126 40 - 129 U/L    AST (SGOT) 10 5 - 32 U/L    ALT (SGPT) 7 5 - 33 U/L   CBC & Differential   Result Value Ref Range    WBC 7.46 4.80 - 10.80 10*3/mm3    RBC 2.84 (L) 4.20 - 5.40 10*6/mm3    Hemoglobin 9.1 (L) 12.0 - 16.0 g/dL    Hematocrit 28.8 (L) 37.0 - 47.0 %    .4 (H) 81.0 - 99.0 fL    MCH 32.0 (H) 27.0 - 31.0 pg    MCHC 31.6 31.0 - 37.0 g/dL    RDW 18.2 (H) 11.5 - 14.5 %    Platelets 362 140 - 500 10*3/mm3    Neutrophil Rel % 59.6 45.0 - 70.0 %    Lymphocyte Rel % 29.6 20.0 - 45.0 %    Monocyte Rel % 5.0 3.0 - 8.0 %    Eosinophil Rel % 4.2 (H) 0.0 - 4.0 %    Basophil Rel % 1.2 0.0 - 2.0 %    Neutrophils Absolute 4.45 1.50 - 8.30 10*3/mm3    Lymphocytes Absolute 2.21 0.60 - 4.80 10*3/mm3    Monocytes Absolute 0.37 0.00 - 1.00 10*3/mm3    Eosinophils Absolute 0.31 (H) 0.10 - 0.30 10*3/mm3    Basophils Absolute 0.09 0.00 - 0.20 10*3/mm3    Immature Granulocyte Rel % 0.4 0.0 - 0.5 %    Immature Grans Absolute 0.03 0.00 - 0.03 10*3/mm3    nRBC 0.0 0.0 - 0.0 /100 WBC           Joya was seen today for follow-up, shoulder pain and neck pain.    Diagnoses and all orders for this visit:    Essential hypertension  -     Ambulatory Referral to Home Health    Chronic back pain, unspecified back location, unspecified back pain laterality  -     Ambulatory Referral to Home Health    Bilateral shoulder pain, unspecified chronicity  -     Ambulatory Referral to Home Health    Orthostatic hypotension  -     Ambulatory Referral to Home Health    Type 2 diabetes mellitus with diabetic autonomic neuropathy, with long-term current use of insulin  -     Ambulatory Referral to Home Health      Stop metoprolol to see if this is contributing to dizziness.  Needs cardiology f/u.     Rest, ice, tylenol for shoulder pain (no nsaids bc of ckd)    Cont current diabetes meds.     Outpatient Medications Prior to Visit   Medication Sig Dispense Refill   • ACCU-CHEK  FASTCLIX LANCETS misc TEST 3-4 TIMES DAILY AS DIRECTED 120 each 10   • ACCU-CHEK SMARTVIEW test strip TEST blood sugar three times daily OR AS DIRECTED 300 each 3   • acetaminophen (TYLENOL) 325 MG tablet Take 2 tablets by mouth Every 6 (Six) Hours As Needed for Mild Pain . OTC product 40 tablet 0   • Alcohol Swabs (B-D SINGLE USE SWABS REGULAR) pads      • aspirin 325 MG tablet Take 325 mg by mouth Daily.     • atorvastatin (LIPITOR) 10 MG tablet Take 10 mg by mouth Every Night.     • Blood Glucose Monitoring Suppl (ACCU-CHEK KENNETH SMARTVIEW) w/Device kit TEST blood sugar three times daily or as directed 1 kit 0   • budesonide (PULMICORT) 0.25 MG/2ML nebulizer solution      • buPROPion SR (WELLBUTRIN SR) 150 MG 12 hr tablet Take 150 mg by mouth Every Night.     • Calcium Carb-Cholecalciferol (CALCIUM PLUS VITAMIN D3 PO) Take 1 tablet by mouth Every Morning.     • colchicine 0.6 MG tablet 2 pills po now then 1 pill po 1 hour later for gout 3 tablet 0   • Cyanocobalamin (VITAMELTS ENERGY VITAMIN B-12) 1500 MCG tablet dispersible Take 1 tablet by mouth Every Morning.     • cyclobenzaprine (FLEXERIL) 10 MG tablet Take one (1) tablet orally (by mouth) three times daily as needed MUSCLE SPASMS 30 tablet 3   • DULoxetine (CYMBALTA) 30 MG capsule Take 3 capsules by mouth Daily for 90 days. 270 capsule 1   • ELIQUIS 2.5 MG tablet tablet Take one (1) tablet orally (by mouth) twice daily 60 tablet 2   • gabapentin (NEURONTIN) 400 MG capsule Take one (1) capsule orally (by mouth) each morning then one (1) in the afternoon and two (2) each night at bedtime 120 capsule 5   • insulin aspart (NOVOLOG FLEXPEN) 100 UNIT/ML solution pen-injector sc pen 20 units sq before breakfast then 25 units sq before lunch and dinner (Patient taking differently: 3 (Three) Times a Day With Meals. 20 units sq before breakfast then 25 units sq before lunch and dinner) 15 mL 6   • Insulin Degludec (TRESIBA FLEXTOUCH) 200 UNIT/ML solution pen-injector  "Inject 75 Units under the skin Daily. 9 mL 0   • Insulin Pen Needle (ULTICARE MINI PEN NEEDLES) 31G X 6 MM misc To check blood sugar once daily 100 each 3   • levothyroxine (SYNTHROID, LEVOTHROID) 75 MCG tablet Take 75 mcg by mouth Every Morning.     • melatonin 5 MG tablet tablet Take 5 mg by mouth At Night As Needed.     • Needle, Disp, (BD DISP NEEDLES) 30G X 1/2\" misc To be used 3 times daily with Novolog Flexpen. 100 each 5   • nystatin (MYCOSTATIN) 618101 UNIT/GM powder Apply  topically 3 (Three) Times a Day. 56.7 g 1   • O2 (OXYGEN) Inhale 2 L/min Every Night.     • omeprazole (priLOSEC) 40 MG capsule Take one (1) capsule orally (by mouth) once daily 90 capsule 1   • ORTHOVISC 30 MG/2ML solution prefilled syringe injection      • thiamine (VITAMIN B-1) 100 MG tablet Take 100 mg by mouth Daily.     • TRESIBA FLEXTOUCH 200 UNIT/ML solution pen-injector Inject eighty (80) UNITS subcutaneously once daily 9 mL 11   • ULTICARE MICRO PEN NEEDLES 32G X 4 MM misc      • ULTICARE PEN NEEDLES 29G X 12MM misc      • metoprolol tartrate (LOPRESSOR) 12.5 MG half tablet Take 12.5 mg by mouth 2 (Two) Times a Day.       No facility-administered medications prior to visit.      No orders of the defined types were placed in this encounter.    [unfilled]  Medications Discontinued During This Encounter   Medication Reason   • metoprolol tartrate (LOPRESSOR) 12.5 MG half tablet          Return in about 4 weeks (around 7/9/2018) for Recheck.  "

## 2018-06-18 RX ORDER — FUROSEMIDE 20 MG/1
TABLET ORAL
Qty: 90 TABLET | Refills: 3 | Status: SHIPPED | OUTPATIENT
Start: 2018-06-18 | End: 2018-10-04

## 2018-06-20 ENCOUNTER — TELEPHONE (OUTPATIENT)
Dept: INTERNAL MEDICINE | Facility: CLINIC | Age: 78
End: 2018-06-20

## 2018-06-20 NOTE — TELEPHONE ENCOUNTER
Spoke with Michaela.  She actually has already called patient's ortho physician about working patient in there today for xray/tx.      ----- Message from Beth Farley MA sent at 6/20/2018  1:32 PM EDT -----  Regarding: FW: PAIN MEDICATION REQUEST FROM NURSE  Contact: [phone]      ----- Message -----  From: Deb Corea  Sent: 6/20/2018   1:01 PM  To: [facility]  Subject: PAIN MEDICATION REQUEST FROM NURSE               ÁLVARO PT    I received a call from Michaela at [facility] regarding Ms. Singh. She said that the patient is complaining of a new injury to her right shoulder, pain is radiating in to her neck and hand. She cannot raise her arm at all, even to hang clothes in closet is excruciating.   Patient had been taking Norco (?), for her left shoulder and knee in the past, but she had weaned herself off to the point that she has just been taking Tylenol for pain. Now she is asking for the pain med to be filled again for her to use.    Michaela said that she will be at [facility] until about 1:45 and you can reach her there or call her cell at [phone]    Thanks!  deb

## 2018-06-25 ENCOUNTER — OFFICE VISIT (OUTPATIENT)
Dept: ORTHOPEDIC SURGERY | Facility: CLINIC | Age: 78
End: 2018-06-25

## 2018-06-25 VITALS
DIASTOLIC BLOOD PRESSURE: 83 MMHG | BODY MASS INDEX: 37.03 KG/M2 | WEIGHT: 209 LBS | SYSTOLIC BLOOD PRESSURE: 141 MMHG | HEART RATE: 80 BPM | HEIGHT: 63 IN

## 2018-06-25 DIAGNOSIS — M19.019 AC JOINT ARTHROPATHY: ICD-10-CM

## 2018-06-25 DIAGNOSIS — M75.41 SUBACROMIAL IMPINGEMENT OF RIGHT SHOULDER: ICD-10-CM

## 2018-06-25 DIAGNOSIS — M75.81 TENDINITIS OF RIGHT ROTATOR CUFF: ICD-10-CM

## 2018-06-25 DIAGNOSIS — R52 PAIN: Primary | ICD-10-CM

## 2018-06-25 PROCEDURE — 73030 X-RAY EXAM OF SHOULDER: CPT | Performed by: ORTHOPAEDIC SURGERY

## 2018-06-25 PROCEDURE — 99214 OFFICE O/P EST MOD 30 MIN: CPT | Performed by: ORTHOPAEDIC SURGERY

## 2018-06-25 PROCEDURE — 20610 DRAIN/INJ JOINT/BURSA W/O US: CPT | Performed by: ORTHOPAEDIC SURGERY

## 2018-06-25 RX ORDER — LIDOCAINE HYDROCHLORIDE 10 MG/ML
4 INJECTION, SOLUTION EPIDURAL; INFILTRATION; INTRACAUDAL; PERINEURAL
Status: COMPLETED | OUTPATIENT
Start: 2018-06-25 | End: 2018-06-25

## 2018-06-25 RX ORDER — POTASSIUM CHLORIDE 750 MG/1
TABLET, FILM COATED, EXTENDED RELEASE ORAL
Qty: 90 TABLET | Refills: 0 | Status: SHIPPED | OUTPATIENT
Start: 2018-06-25 | End: 2018-09-28

## 2018-06-25 RX ORDER — BETAMETHASONE SODIUM PHOSPHATE AND BETAMETHASONE ACETATE 3; 3 MG/ML; MG/ML
12 INJECTION, SUSPENSION INTRA-ARTICULAR; INTRALESIONAL; INTRAMUSCULAR; SOFT TISSUE
Status: COMPLETED | OUTPATIENT
Start: 2018-06-25 | End: 2018-06-25

## 2018-06-25 RX ADMIN — BETAMETHASONE SODIUM PHOSPHATE AND BETAMETHASONE ACETATE 12 MG: 3; 3 INJECTION, SUSPENSION INTRA-ARTICULAR; INTRALESIONAL; INTRAMUSCULAR; SOFT TISSUE at 12:06

## 2018-06-25 RX ADMIN — LIDOCAINE HYDROCHLORIDE 4 ML: 10 INJECTION, SOLUTION EPIDURAL; INFILTRATION; INTRACAUDAL; PERINEURAL at 12:06

## 2018-06-25 NOTE — PROGRESS NOTES
Subjective:     Patient ID: Joya Singh is a 78 y.o. female.    Chief Complaint:  Right shoulder pain, new issue to examiner  History of Present Illness  Joya Singh returns to clinic today for evaluation of right shoulder pain is been present for proximally last 2 weeks, she states that she slipped at her son's house and fell onto a left see directly onto the lateral aspect of her right shoulder.  She is noted moderate pain since that time localized primarily to the lateral and superior aspect of the right shoulder, notes a moderate amount of pain over her acromioclavicular joint with significant increased pain on crossarm positioning and overhead lifting pushing or pulling.  Rates it currently as 7-8 out of 10 in aching in nature, does wax and wane, denies associated numbness or tingling, does note some difficulties with lifting her arm overhead without any weight at all.  Mild radiation of pain to the lateral aspect right arm.  Denies any associated neck pain at this time.  No improvement with rest, activity modification, soft tissue massage, over-the-counter anti-inflammatory medications.     Social History     Occupational History   • Not on file.     Social History Main Topics   • Smoking status: Never Smoker   • Smokeless tobacco: Never Used      Comment: CAFFEINE USE: NONE   • Alcohol use No   • Drug use: No   • Sexual activity: Defer      Comment: EXERCISE - RARELY      Past Medical History:   Diagnosis Date   • Allergic rhinitis    • Anxiety    • Arthritis    • Bell's palsy    • CKD (chronic kidney disease) stage 3, GFR 30-59 ml/min    • Depression    • Diabetes mellitus     LAST A1C 6   • Diabetic gastroparesis 2/19/2016   • GERD (gastroesophageal reflux disease)    • H/O blood clots     LEFT LEG 7 OR 8 YEARS AGO   • Hyperlipidemia    • Hypertension    • Hypothyroidism    • Normal coronary arteries     by cath 2013   • AARON (obstructive sleep apnea)     DOESNT WEAR REGULARLY   • PONV (postoperative nausea and  "vomiting)    • Skin cancer    • Stroke    • TIA (transient ischemic attack)     LAST TIA JULY 2017     Past Surgical History:   Procedure Laterality Date   • BACK SURGERY      HARDWARE   • CHOLECYSTECTOMY      OPEN   • COLONOSCOPY  2011    due for repeat in 2021   • HYSTERECTOMY      PARTIAL    • NECK SURGERY     • SPINE SURGERY     • UPPER GASTROINTESTINAL ENDOSCOPY  2014    gastritis.  done by dr. hastings       Family History   Problem Relation Age of Onset   • Lupus Mother    • Heart failure Mother 59   • Heart disease Other    • Hypertension Other    • Heart attack Father          Review of Systems   Constitutional: Negative for chills, diaphoresis, fever and unexpected weight change.   HENT: Negative for hearing loss, nosebleeds, sore throat and tinnitus.    Eyes: Negative for pain and visual disturbance.   Respiratory: Negative for cough, shortness of breath and wheezing.    Cardiovascular: Negative for chest pain and palpitations.   Gastrointestinal: Negative for abdominal pain, diarrhea, nausea and vomiting.   Endocrine: Negative for cold intolerance, heat intolerance and polydipsia.   Genitourinary: Negative for difficulty urinating, dysuria and hematuria.   Musculoskeletal: Positive for arthralgias, myalgias and neck pain. Negative for joint swelling.   Skin: Negative for rash and wound.   Allergic/Immunologic: Negative for environmental allergies.   Neurological: Negative for dizziness, syncope and numbness.   Hematological: Does not bruise/bleed easily.   Psychiatric/Behavioral: Negative for dysphoric mood and sleep disturbance. The patient is not nervous/anxious.            Objective:  Vitals:    06/25/18 1138   BP: 141/83   BP Location: Left arm   Pulse: 80   Weight: 94.8 kg (209 lb)   Height: 158.8 cm (62.5\")     1    06/25/18  1138   Weight: 94.8 kg (209 lb)     Body mass index is 37.62 kg/m².  General: No acute distress.  Resp: normal respiratory effort  Skin: no rashes or wounds; normal " turgor  Psych: mood and affect appropriate; recent and remote memory intact         Ortho Exam    Right shoulder-active forward flexion 155°, passive forward flexion 165°, active external rotation 40°, passive 45°, 4 out of 5 strength in both planes of motion, positive empty can test.  Active internal rotation L3, 4+ out of 5 strength on belly press test with negative bear hug sign.  Moderate tenderness over acromial clavicular joint as well as anterior laterally over McBurney's point, positive Mejia and Neer's, positive crossarm test, negative drop arm test, negative external rotation lag sign.  Negative Spurling exam with no midline or paraspinal tenderness of cervical spine.  Brisk cap refill all digits, 2+ radial pulse right wrist.  Positive sensation light touch all discretions right hand symmetric to the left.    Imaging:  Right Shoulder X-Ray  Indication: Pain  AP, scapular Y, and axillary lateral views    Findings:  No fracture  No bony lesion  Normal soft tissues  Moderate joint space narrowing with mild osteophyte change at the acromioclavicular joint    No prior studies were available for comparison.    Assessment:       1. Pain    2. Subacromial impingement of right shoulder    3. AC joint arthropathy    4. Tendinitis of right rotator cuff          Plan:  Large Joint Arthrocentesis  Date/Time: 6/25/2018 12:06 PM  Consent given by: patient  Site marked: site marked  Timeout: Immediately prior to procedure a time out was called to verify the correct patient, procedure, equipment, support staff and site/side marked as required   Supporting Documentation  Indications: pain   Procedure Details  Location: shoulder - R subacromial bursa  Preparation: Patient was prepped and draped in the usual sterile fashion  Needle size: 22 G  Approach: lateral  Medications administered: 4 mL lidocaine PF 1% 1 %; 12 mg betamethasone acetate-betamethasone sodium phosphate 6 (3-3) MG/ML  Patient tolerance: patient tolerated  the procedure well with no immediate complications                Discussed treatment options at length with patient at today's visit.  After reviewing options patient wished to proceed with subacromial injection today, home exercises for strengthening, soft tissue massage.  Follow-up in 6 weeks to assess for improvement.  May consider advanced imaging.    Joya Singh was in agreement with plan and had all questions answered.     Orders:  Orders Placed This Encounter   Procedures   • Large Joint Arthrocentesis   • XR Shoulder 2+ View Right       Medications:  No orders of the defined types were placed in this encounter.      Followup:  Return in about 6 weeks (around 8/6/2018).    Joya was seen today for pain and pain.    Diagnoses and all orders for this visit:    Pain  -     XR Shoulder 2+ View Right    Subacromial impingement of right shoulder  -     Large Joint Arthrocentesis    AC joint arthropathy  -     Large Joint Arthrocentesis    Tendinitis of right rotator cuff  -     Large Joint Arthrocentesis          Dictated utilizing Dragon dictation

## 2018-06-27 ENCOUNTER — TELEPHONE (OUTPATIENT)
Dept: INTERNAL MEDICINE | Facility: CLINIC | Age: 78
End: 2018-06-27

## 2018-06-27 NOTE — TELEPHONE ENCOUNTER
Dr. Mercado on vacation, per Dr. Salas, patient may be seen tomorrow or go to ER if SOB. I spoke with patient, she states that she is still having SOB but she doesn't think its bad enough to go to ER. Patient scheduled for tomorrow with Dr. Salas. Patient is going to find a ride or call us back to cancel. She understands that she needs to go to the ER or call 911 if her SOB gets worse. Appt made.  ----- Message from Johnna John MA sent at 6/27/2018 10:55 AM EDT -----  Regarding: FW: BP ISSUES      ----- Message -----  From: Heydi Jackson  Sent: 6/27/2018  10:51 AM  To: [facility] Clinical Pool  Subject: BP ISSUES                                          Luann with home care @ [phone]    Patient has been taken off of Metoprolol  Today: 172/90 BP decreased to 165/85 after deep breathing exercise.  Pulse was 81 then decreased to 70s after deep breathing  No pain reported and has seen Dr. Hilliard about her shoulder    172/91 BP yesterday while PT there    Please contact patient with any direction

## 2018-07-06 ENCOUNTER — TELEPHONE (OUTPATIENT)
Dept: INTERNAL MEDICINE | Facility: CLINIC | Age: 78
End: 2018-07-06

## 2018-07-06 DIAGNOSIS — E10.9 BRITTLE DIABETES MELLITUS (HCC): ICD-10-CM

## 2018-07-06 DIAGNOSIS — E11.43 TYPE 2 DIABETES MELLITUS WITH DIABETIC AUTONOMIC NEUROPATHY, WITH LONG-TERM CURRENT USE OF INSULIN (HCC): ICD-10-CM

## 2018-07-06 DIAGNOSIS — E11.43 TYPE 2 DIABETES MELLITUS WITH AUTONOMIC NEUROPATHY, UNSPECIFIED LONG TERM INSULIN USE STATUS: ICD-10-CM

## 2018-07-06 DIAGNOSIS — Z79.4 TYPE 2 DIABETES MELLITUS WITH DIABETIC AUTONOMIC NEUROPATHY, WITH LONG-TERM CURRENT USE OF INSULIN (HCC): ICD-10-CM

## 2018-07-06 NOTE — TELEPHONE ENCOUNTER
Per Dr. Rogelio LM on Michaela's VM to decrease Tresiba to 50 units daily.      ----- Message from Beth Farley MA sent at 7/6/2018  3:59 PM EDT -----  Michaela ([phone])  from HH calls, patient's AM BS is running in the 30's since per daughter-in-law has been cooking low carb/high protein meals for her.  She is on Tresiba 75 units daily and has not used her Novolog in days.  Please advise.

## 2018-07-09 DIAGNOSIS — I82.4Y9 DEEP VEIN THROMBOSIS (DVT) OF PROXIMAL LOWER EXTREMITY, UNSPECIFIED CHRONICITY, UNSPECIFIED LATERALITY (HCC): ICD-10-CM

## 2018-07-09 RX ORDER — APIXABAN 2.5 MG/1
TABLET, FILM COATED ORAL
Qty: 60 TABLET | Refills: 5 | Status: SHIPPED | OUTPATIENT
Start: 2018-07-09 | End: 2018-07-18 | Stop reason: SDUPTHER

## 2018-07-09 NOTE — TELEPHONE ENCOUNTER
There have been no changes to patients Gabapentin. I spoke with pharmacy who say they think what might have happened is that the full label was not put on prescription bottle, therefore all of the directions were not visible. I spoke with patient and assured her that no one had changed her directions and to take them as she usually does, one in the AM, one at noon, and two at bedtime. She is very relieved.   ----- Message from Deb Corea sent at 7/9/2018  4:02 PM EDT -----  Regarding: MEDICATION  Contact: [phone]  [facility]    Patient called questioning her med. She said that right now she is taking 1 in the AM , 1 at noon and 2 at bedtime.  New directions say to take 1 at AM , 1 at bedtime. She said that she does not think she can take 1 at bedtime??    Per davy I told her that davy would talk to dr burnham and call her back today before she leaves.    Thanks!  deb

## 2018-07-12 DIAGNOSIS — E10.9 BRITTLE DIABETES MELLITUS (HCC): ICD-10-CM

## 2018-07-12 DIAGNOSIS — E11.43 TYPE 2 DIABETES MELLITUS WITH AUTONOMIC NEUROPATHY, UNSPECIFIED LONG TERM INSULIN USE STATUS: ICD-10-CM

## 2018-07-17 ENCOUNTER — OFFICE VISIT (OUTPATIENT)
Dept: INTERNAL MEDICINE | Facility: CLINIC | Age: 78
End: 2018-07-17

## 2018-07-17 VITALS
SYSTOLIC BLOOD PRESSURE: 120 MMHG | HEART RATE: 84 BPM | WEIGHT: 205 LBS | HEIGHT: 63 IN | OXYGEN SATURATION: 94 % | DIASTOLIC BLOOD PRESSURE: 78 MMHG | BODY MASS INDEX: 36.32 KG/M2

## 2018-07-17 DIAGNOSIS — I10 ESSENTIAL HYPERTENSION: ICD-10-CM

## 2018-07-17 DIAGNOSIS — E11.43 TYPE 2 DIABETES MELLITUS WITH DIABETIC AUTONOMIC NEUROPATHY, WITH LONG-TERM CURRENT USE OF INSULIN (HCC): ICD-10-CM

## 2018-07-17 DIAGNOSIS — M75.81 TENDINITIS OF RIGHT ROTATOR CUFF: Primary | ICD-10-CM

## 2018-07-17 DIAGNOSIS — G56.01 RIGHT CARPAL TUNNEL SYNDROME: ICD-10-CM

## 2018-07-17 DIAGNOSIS — N18.30 CKD STAGE 3 DUE TO TYPE 2 DIABETES MELLITUS (HCC): ICD-10-CM

## 2018-07-17 DIAGNOSIS — Z79.4 TYPE 2 DIABETES MELLITUS WITH DIABETIC AUTONOMIC NEUROPATHY, WITH LONG-TERM CURRENT USE OF INSULIN (HCC): ICD-10-CM

## 2018-07-17 DIAGNOSIS — I82.499 DEEP VEIN THROMBOSIS (DVT) OF OTHER VEIN OF LOWER EXTREMITY, UNSPECIFIED CHRONICITY, UNSPECIFIED LATERALITY (HCC): ICD-10-CM

## 2018-07-17 DIAGNOSIS — E11.22 CKD STAGE 3 DUE TO TYPE 2 DIABETES MELLITUS (HCC): ICD-10-CM

## 2018-07-17 DIAGNOSIS — E78.2 MIXED HYPERLIPIDEMIA: ICD-10-CM

## 2018-07-17 PROCEDURE — 99214 OFFICE O/P EST MOD 30 MIN: CPT | Performed by: INTERNAL MEDICINE

## 2018-07-17 RX ORDER — PHENYLEPHRINE HYDROCHLORIDE 10 MG/1
1 TABLET, COATED ORAL DAILY
Qty: 1 EACH | Refills: 0 | Status: ON HOLD | OUTPATIENT
Start: 2018-07-17 | End: 2018-11-20

## 2018-07-17 NOTE — PROGRESS NOTES
Joya Singh is a 78 y.o. female, who presents with a chief complaint of   Chief Complaint   Patient presents with   • Follow-up   • Diabetes       HPI   The pt is here for f/u.     She has labile diabetes.  She brought her meter and most glucoses are under 150.  She has been on a lower carb diet with her family. She was having low glucoses but has been more leveled out over the past few days.  She is taking 50 units of tresiba and novolog 20, 25, 25 units tid.     She has pain that starts in her right wrist and goes up to her shoulder. She says it feels like an electrical wire.  She is already on gabapentin and cymbalta. She has had shoulder issues before but no wrist issues.  Her fingers are going numb bc of this.  She is right handed.     htn - well controlled.  No ha.  No le edema.    Chronic anemia - stable      The following portions of the patient's history were reviewed and updated as appropriate: allergies, current medications, past family history, past medical history, past social history, past surgical history and problem list.    Allergies: Lisinopril; Morphine and related; Baclofen; Codeine; Morphine; and Penicillins    Review of Systems   Constitutional: Negative.    HENT: Negative.    Eyes: Negative.    Respiratory: Negative.    Cardiovascular: Negative.    Gastrointestinal: Negative.    Endocrine: Negative.    Genitourinary: Negative.    Musculoskeletal:        Right wrist pain.   Skin: Negative.    Allergic/Immunologic: Negative.    Neurological: Negative.    Hematological: Negative.    Psychiatric/Behavioral: Negative.    All other systems reviewed and are negative.            Wt Readings from Last 3 Encounters:   07/17/18 93 kg (205 lb)   06/25/18 94.8 kg (209 lb)   06/05/18 95.3 kg (210 lb)     Temp Readings from Last 3 Encounters:   04/13/18 98.1 °F (36.7 °C) (Oral)   03/16/18 98.2 °F (36.8 °C)   02/21/18 98.6 °F (37 °C)     BP Readings from Last 3 Encounters:   07/17/18 120/78   06/25/18  141/83   06/11/18 118/82     Pulse Readings from Last 3 Encounters:   07/17/18 84   06/25/18 80   06/11/18 77     Body mass index is 36.9 kg/m².  @LASTSAO2(3)@    Physical Exam   Constitutional: She is oriented to person, place, and time. She appears well-developed and well-nourished. No distress.   HENT:   Head: Normocephalic and atraumatic.   Right Ear: External ear normal.   Left Ear: External ear normal.   Nose: Nose normal.   Mouth/Throat: Oropharynx is clear and moist.   Eyes: Conjunctivae and EOM are normal. Pupils are equal, round, and reactive to light.   Neck: Normal range of motion. Neck supple.   Cardiovascular: Normal rate, regular rhythm, normal heart sounds and intact distal pulses.    Pulmonary/Chest: Effort normal and breath sounds normal. No respiratory distress. She has no wheezes.   Musculoskeletal: Normal range of motion.   Neurological: She is alert and oriented to person, place, and time.   Skin: Skin is warm and dry.   Psychiatric: She has a normal mood and affect. Her behavior is normal. Judgment and thought content normal.   Nursing note and vitals reviewed.      Results for orders placed or performed in visit on 05/23/18   Comprehensive Metabolic Panel   Result Value Ref Range    Glucose 69 65 - 99 mg/dL    BUN 24 (H) 8 - 23 mg/dL    Creatinine 1.50 (H) 0.57 - 1.00 mg/dL    eGFR Non African Am 34 (L) >60 mL/min/1.73    eGFR  Am 41 (L) >60 mL/min/1.73    BUN/Creatinine Ratio 16.0 7.0 - 25.0    Sodium 144 136 - 145 mmol/L    Potassium 4.8 3.5 - 5.2 mmol/L    Chloride 104 98 - 107 mmol/L    Total CO2 26.5 22.0 - 29.0 mmol/L    Calcium 9.3 8.8 - 10.5 mg/dL    Total Protein 6.3 6.0 - 8.5 g/dL    Albumin 3.80 3.50 - 5.20 g/dL    Globulin 2.5 gm/dL    A/G Ratio 1.5 g/dL    Total Bilirubin 0.3 0.2 - 1.2 mg/dL    Alkaline Phosphatase 126 40 - 129 U/L    AST (SGOT) 10 5 - 32 U/L    ALT (SGPT) 7 5 - 33 U/L   CBC & Differential   Result Value Ref Range    WBC 7.46 4.80 - 10.80 10*3/mm3    RBC  2.84 (L) 4.20 - 5.40 10*6/mm3    Hemoglobin 9.1 (L) 12.0 - 16.0 g/dL    Hematocrit 28.8 (L) 37.0 - 47.0 %    .4 (H) 81.0 - 99.0 fL    MCH 32.0 (H) 27.0 - 31.0 pg    MCHC 31.6 31.0 - 37.0 g/dL    RDW 18.2 (H) 11.5 - 14.5 %    Platelets 362 140 - 500 10*3/mm3    Neutrophil Rel % 59.6 45.0 - 70.0 %    Lymphocyte Rel % 29.6 20.0 - 45.0 %    Monocyte Rel % 5.0 3.0 - 8.0 %    Eosinophil Rel % 4.2 (H) 0.0 - 4.0 %    Basophil Rel % 1.2 0.0 - 2.0 %    Neutrophils Absolute 4.45 1.50 - 8.30 10*3/mm3    Lymphocytes Absolute 2.21 0.60 - 4.80 10*3/mm3    Monocytes Absolute 0.37 0.00 - 1.00 10*3/mm3    Eosinophils Absolute 0.31 (H) 0.10 - 0.30 10*3/mm3    Basophils Absolute 0.09 0.00 - 0.20 10*3/mm3    Immature Granulocyte Rel % 0.4 0.0 - 0.5 %    Immature Grans Absolute 0.03 0.00 - 0.03 10*3/mm3    nRBC 0.0 0.0 - 0.0 /100 WBC           Joya was seen today for follow-up and diabetes.    Diagnoses and all orders for this visit:    Tendinitis of right rotator cuff - exercises give and cont supportive care (rest/ice).  F/u with ortho if not improving    Right carpal tunnel syndrome - trial of bracing.  Avoid nsaids bc of ckd and avoid steroids bc of DM.   -     Elastic Bandages & Supports (CARPAL TUNNEL WRIST STABILIZER) misc; 1 each Daily.    Type 2 diabetes mellitus with diabetic autonomic neuropathy, with long-term current use of insulin (CMS/[facility])  -     Comprehensive Metabolic Panel; Future  -     CBC & Differential; Future  -     T4, Free; Future  -     TSH; Future  -     Hemoglobin A1c; Future    CKD stage 3 due to type 2 diabetes mellitus (CMS/[facility])  -     Comprehensive Metabolic Panel; Future  -     CBC & Differential; Future    Deep vein thrombosis (DVT) of other vein of lower extremity, unspecified chronicity, unspecified laterality (CMS/HCC)    Mixed hyperlipidemia    Essential hypertension  -     Comprehensive Metabolic Panel; Future  -     CBC & Differential; Future  -     T4, Free; Future  -     TSH;  Future          Outpatient Medications Prior to Visit   Medication Sig Dispense Refill   • ACCU-CHEK FASTCLIX LANCETS misc TEST 3-4 TIMES DAILY AS DIRECTED 120 each 10   • ACCU-CHEK SMARTVIEW test strip TEST blood sugar three times daily OR AS DIRECTED 300 each 3   • acetaminophen (TYLENOL) 325 MG tablet Take 2 tablets by mouth Every 6 (Six) Hours As Needed for Mild Pain . OTC product 40 tablet 0   • Alcohol Swabs (B-D SINGLE USE SWABS REGULAR) pads      • aspirin 325 MG tablet Take 325 mg by mouth Daily.     • atorvastatin (LIPITOR) 10 MG tablet Take 10 mg by mouth Every Night.     • Blood Glucose Monitoring Suppl (ACCU-CHEK KENNETH SMARTVIEW) w/Device kit TEST blood sugar three times daily or as directed 1 kit 0   • budesonide (PULMICORT) 0.25 MG/2ML nebulizer solution      • buPROPion SR (WELLBUTRIN SR) 150 MG 12 hr tablet Take 150 mg by mouth Every Night.     • Calcium Carb-Cholecalciferol (CALCIUM PLUS VITAMIN D3 PO) Take 1 tablet by mouth Every Morning.     • colchicine 0.6 MG tablet 2 pills po now then 1 pill po 1 hour later for gout 3 tablet 0   • Cyanocobalamin (VITAMELTS ENERGY VITAMIN B-12) 1500 MCG tablet dispersible Take 1 tablet by mouth Every Morning.     • cyclobenzaprine (FLEXERIL) 10 MG tablet Take one (1) tablet orally (by mouth) three times daily as needed MUSCLE SPASMS 30 tablet 3   • DULoxetine (CYMBALTA) 30 MG capsule Take 3 capsules by mouth Daily for 90 days. 270 capsule 1   • ELIQUIS 2.5 MG tablet tablet Take one (1) tablet orally (by mouth) twice daily 60 tablet 5   • furosemide (LASIX) 20 MG tablet Take one (1) tablet orally (by mouth) once daily 90 tablet 3   • gabapentin (NEURONTIN) 400 MG capsule Take one (1) capsule orally (by mouth) each morning then one (1) in the afternoon and two (2) each night at bedtime 120 capsule 5   • insulin aspart (NOVOLOG FLEXPEN) 100 UNIT/ML solution pen-injector sc pen 20 units sq before breakfast then 25 units sq before lunch and dinner (Patient taking  "differently: 3 (Three) Times a Day With Meals. 20 units sq before breakfast then 25 units sq before lunch and dinner) 15 mL 6   • Insulin Degludec (TRESIBA FLEXTOUCH) 200 UNIT/ML solution pen-injector Inject 50 Units under the skin Daily. 3 pen 11   • Insulin Pen Needle (ULTICARE MINI PEN NEEDLES) 31G X 6 MM misc To check blood sugar once daily 100 each 3   • levothyroxine (SYNTHROID, LEVOTHROID) 75 MCG tablet Take 75 mcg by mouth Every Morning.     • melatonin 5 MG tablet tablet Take 5 mg by mouth At Night As Needed.     • Needle, Disp, (BD DISP NEEDLES) 30G X 1/2\" misc To be used 3 times daily with Novolog Flexpen. 100 each 5   • nystatin (MYCOSTATIN) 120384 UNIT/GM powder Apply  topically 3 (Three) Times a Day. 56.7 g 1   • O2 (OXYGEN) Inhale 2 L/min Every Night.     • omeprazole (priLOSEC) 40 MG capsule Take one (1) capsule orally (by mouth) once daily 90 capsule 1   • ORTHOVISC 30 MG/2ML solution prefilled syringe injection      • potassium chloride (K-DUR) 10 MEQ CR tablet Take one (1) tablet orally (by mouth) once daily 90 tablet 0   • thiamine (VITAMIN B-1) 100 MG tablet Take 100 mg by mouth Daily.     • ULTICARE MICRO PEN NEEDLES 32G X 4 MM misc      • ULTICARE PEN NEEDLES 29G X 12MM misc        No facility-administered medications prior to visit.      New Medications Ordered This Visit   Medications   • Elastic Bandages & Supports (CARPAL TUNNEL WRIST STABILIZER) misc     Si each Daily.     Dispense:  1 each     Refill:  0     [unfilled]  There are no discontinued medications.      Return in about 6 weeks (around 2018) for Recheck, labs.  "

## 2018-07-17 NOTE — PATIENT INSTRUCTIONS
Carpal Tunnel Syndrome  Carpal tunnel syndrome is a condition that causes pain in your hand and arm. The carpal tunnel is a narrow area that is on the palm side of your wrist. Repeated wrist motion or certain diseases may cause swelling in the tunnel. This swelling can pinch the main nerve in the wrist (median nerve).  Follow these instructions at home:  If you have a splint:  · Wear it as told by your doctor. Remove it only as told by your doctor.  · Loosen the splint if your fingers:  ? Become numb and tingle.  ? Turn blue and cold.  · Keep the splint clean and dry.  General instructions  · Take over-the-counter and prescription medicines only as told by your doctor.  · Rest your wrist from any activity that may be causing your pain. If needed, talk to your employer about changes that can be made in your work, such as getting a wrist pad to use while typing.  · If directed, apply ice to the painful area:  ? Put ice in a plastic bag.  ? Place a towel between your skin and the bag.  ? Leave the ice on for 20 minutes, 2-3 times per day.  · Keep all follow-up visits as told by your doctor. This is important.  · Do any exercises as told by your doctor, physical therapist, or occupational therapist.  Contact a doctor if:  · You have new symptoms.  · Medicine does not help your pain.  · Your symptoms get worse.  This information is not intended to replace advice given to you by your health care provider. Make sure you discuss any questions you have with your health care provider.  Document Released: 12/06/2012 Document Revised: 05/25/2017 Document Reviewed: 05/04/2016  Elsevier Interactive Patient Education © 2018 [employer]  Shoulder Exercises  Ask your health care provider which exercises are safe for you. Do exercises exactly as told by your health care provider and adjust them as directed. It is normal to feel mild stretching, pulling, tightness, or discomfort as you do these exercises, but you should stop right  away if you feel sudden pain or your pain gets worse. Do not begin these exercises until told by your health care provider.  RANGE OF MOTION EXERCISES  These exercises warm up your muscles and joints and improve the movement and flexibility of your shoulder. These exercises also help to relieve pain, numbness, and tingling. These exercises involve stretching your injured shoulder directly.  Exercise A: Pendulum    1. Stand near a wall or a surface that you can hold onto for balance.  2. Bend at the waist and let your left / right arm hang straight down. Use your other arm to support you. Keep your back straight and do not lock your knees.  3. Relax your left / right arm and shoulder muscles, and move your hips and your trunk so your left / right arm swings freely. Your arm should swing because of the motion of your body, not because you are using your arm or shoulder muscles.  4. Keep moving your body so your arm swings in the following directions, as told by your health care provider:  ? Side to side.  ? Forward and backward.  ? In clockwise and counterclockwise circles.  5. Continue each motion for __________ seconds, or for as long as told by your health care provider.  6. Slowly return to the starting position.  Repeat __________ times. Complete this exercise __________ times a day.  Exercise B: Flexion, Standing    1. Stand and hold a broomstick, a cane, or a similar object. Place your hands a little more than shoulder-width apart on the object. Your left / right hand should be palm-up, and your other hand should be palm-down.  2. Keep your elbow straight and keep your shoulder muscles relaxed. Push the stick down with your healthy arm to raise your left / right arm in front of your body, and then over your head until you feel a stretch in your shoulder.  ? Avoid shrugging your shoulder while you raise your arm. Keep your shoulder blade tucked down toward the middle of your back.  3. Hold for __________  seconds.  4. Slowly return to the starting position.  Repeat __________ times. Complete this exercise __________ times a day.  Exercise C: Abduction, Standing  1. Stand and hold a broomstick, a cane, or a similar object. Place your hands a little more than shoulder-width apart on the object. Your left / right hand should be palm-up, and your other hand should be palm-down.  2. While keeping your elbow straight and your shoulder muscles relaxed, push the stick across your body toward your left / right side. Raise your left / right arm to the side of your body and then over your head until you feel a stretch in your shoulder.  ? Do not raise your arm above shoulder height, unless your health care provider tells you to do that.  ? Avoid shrugging your shoulder while you raise your arm. Keep your shoulder blade tucked down toward the middle of your back.  3. Hold for __________ seconds.  4. Slowly return to the starting position.  Repeat __________ times. Complete this exercise __________ times a day.  Exercise D: Internal Rotation    1. Place your left / right hand behind your back, palm-up.  2. Use your other hand to dangle an exercise band, a towel, or a similar object over your shoulder. Grasp the band with your left / right hand so you are holding onto both ends.  3. Gently pull up on the band until you feel a stretch in the front of your left / right shoulder.  ? Avoid shrugging your shoulder while you raise your arm. Keep your shoulder blade tucked down toward the middle of your back.  4. Hold for __________ seconds.  5. Release the stretch by letting go of the band and lowering your hands.  Repeat __________ times. Complete this exercise __________ times a day.  STRETCHING EXERCISES  These exercises warm up your muscles and joints and improve the movement and flexibility of your shoulder. These exercises also help to relieve pain, numbness, and tingling. These exercises are done using your healthy shoulder to  help stretch the muscles of your injured shoulder.  Exercise E: Corner Stretch (External Rotation and Abduction)    1.  a doorway with one of your feet slightly in front of the other. This is called a staggered stance. If you cannot reach your forearms to the door frame, stand facing a corner of a room.  2. Choose one of the following positions as told by your health care provider:  ? Place your hands and forearms on the door frame above your head.  ? Place your hands and forearms on the door frame at the height of your head.  ? Place your hands on the door frame at the height of your elbows.  3. Slowly move your weight onto your front foot until you feel a stretch across your chest and in the front of your shoulders. Keep your head and chest upright and keep your abdominal muscles tight.  4. Hold for __________ seconds.  5. To release the stretch, shift your weight to your back foot.  Repeat __________ times. Complete this stretch __________ times a day.  Exercise F: Extension, Standing  1. Stand and hold a broomstick, a cane, or a similar object behind your back.  ? Your hands should be a little wider than shoulder-width apart.  ? Your palms should face away from your back.  2. Keeping your elbows straight and keeping your shoulder muscles relaxed, move the stick away from your body until you feel a stretch in your shoulder.  ? Avoid shrugging your shoulders while you move the stick. Keep your shoulder blade tucked down toward the middle of your back.  3. Hold for __________ seconds.  4. Slowly return to the starting position.  Repeat __________ times. Complete this exercise __________ times a day.  STRENGTHENING EXERCISES  These exercises build strength and endurance in your shoulder. Endurance is the ability to use your muscles for a long time, even after they get tired.  Exercise G: External Rotation    1. Sit in a stable chair without armrests.  2. Secure an exercise band at elbow height on your left /  right side.  3. Place a soft object, such as a folded towel or a small pillow, between your left / right upper arm and your body to move your elbow a few inches away (about 10 cm) from your side.  4. Hold the end of the band so it is tight and there is no slack.  5. Keeping your elbow pressed against the soft object, move your left / right forearm out, away from your abdomen. Keep your body steady so only your forearm moves.  6. Hold for __________ seconds.  7. Slowly return to the starting position.  Repeat __________ times. Complete this exercise __________ times a day.  Exercise H: Shoulder Abduction    1. Sit in a stable chair without armrests, or stand.  2. Hold a __________ weight in your left / right hand, or hold an exercise band with both hands.  3. Start with your arms straight down and your left / right palm facing in, toward your body.  4. Slowly lift your left / right hand out to your side. Do not lift your hand above shoulder height unless your health care provider tells you that this is safe.  ? Keep your arms straight.  ? Avoid shrugging your shoulder while you do this movement. Keep your shoulder blade tucked down toward the middle of your back.  5. Hold for __________ seconds.  6. Slowly lower your arm, and return to the starting position.  Repeat __________ times. Complete this exercise __________ times a day.  Exercise I: Shoulder Extension  1. Sit in a stable chair without armrests, or stand.  2. Secure an exercise band to a stable object in front of you where it is at shoulder height.  3. Hold one end of the exercise band in each hand. Your palms should face each other.  4. Straighten your elbows and lift your hands up to shoulder height.  5. Step back, away from the secured end of the exercise band, until the band is tight and there is no slack.  6. Squeeze your shoulder blades together as you pull your hands down to the sides of your thighs. Stop when your hands are straight down by your  "sides. Do not let your hands go behind your body.  7. Hold for __________ seconds.  8. Slowly return to the starting position.  Repeat __________ times. Complete this exercise __________ times a day.  Exercise J: Standing Shoulder Row  1. Sit in a stable chair without armrests, or stand.  2. Secure an exercise band to a stable object in front of you so it is at waist height.  3. Hold one end of the exercise band in each hand. Your palms should be in a thumbs-up position.  4. Bend each of your elbows to an \"L\" shape (about 90 degrees) and keep your upper arms at your sides.  5. Step back until the band is tight and there is no slack.  6. Slowly pull your elbows back behind you.  7. Hold for __________ seconds.  8. Slowly return to the starting position.  Repeat __________ times. Complete this exercise __________ times a day.  Exercise K: Shoulder Press-Ups    1. Sit in a stable chair that has armrests. Sit upright, with your feet flat on the floor.  2. Put your hands on the armrests so your elbows are bent and your fingers are pointing forward. Your hands should be about even with the sides of your body.  3. Push down on the armrests and use your arms to lift yourself off of the chair. Straighten your elbows and lift yourself up as much as you comfortably can.  ? Move your shoulder blades down, and avoid letting your shoulders move up toward your ears.  ? Keep your feet on the ground. As you get stronger, your feet should support less of your body weight as you lift yourself up.  4. Hold for __________ seconds.  5. Slowly lower yourself back into the chair.  Repeat __________ times. Complete this exercise __________ times a day.  Exercise L: Wall Push-Ups    1. Stand so you are facing a stable wall. Your feet should be about one arm-length away from the wall.  2. Lean forward and place your palms on the wall at shoulder height.  3. Keep your feet flat on the floor as you bend your elbows and lean forward toward the " wall.  4. Hold for __________ seconds.  5. Straighten your elbows to push yourself back to the starting position.  Repeat __________ times. Complete this exercise __________ times a day.  This information is not intended to replace advice given to you by your health care provider. Make sure you discuss any questions you have with your health care provider.  Document Released: 11/01/2006 Document Revised: 09/11/2017 Document Reviewed: 08/28/2016  Elsevier Interactive Patient Education © 2018 [employer]

## 2018-07-18 DIAGNOSIS — I82.4Y9 DEEP VEIN THROMBOSIS (DVT) OF PROXIMAL LOWER EXTREMITY, UNSPECIFIED CHRONICITY, UNSPECIFIED LATERALITY (HCC): ICD-10-CM

## 2018-07-18 LAB
ALBUMIN SERPL-MCNC: 4 G/DL (ref 3.5–4.8)
ALBUMIN/GLOB SERPL: 1.4 {RATIO} (ref 1.2–2.2)
ALP SERPL-CCNC: 146 IU/L (ref 39–117)
ALT SERPL-CCNC: 8 IU/L (ref 0–32)
AST SERPL-CCNC: 10 IU/L (ref 0–40)
BASOPHILS # BLD AUTO: 0.1 X10E3/UL (ref 0–0.2)
BASOPHILS NFR BLD AUTO: 1 %
BILIRUB SERPL-MCNC: 0.4 MG/DL (ref 0–1.2)
BUN SERPL-MCNC: 21 MG/DL (ref 8–27)
BUN/CREAT SERPL: 12 (ref 12–28)
CALCIUM SERPL-MCNC: 9.2 MG/DL (ref 8.7–10.3)
CHLORIDE SERPL-SCNC: 105 MMOL/L (ref 96–106)
CO2 SERPL-SCNC: 26 MMOL/L (ref 20–29)
CREAT SERPL-MCNC: 1.71 MG/DL (ref 0.57–1)
EOSINOPHIL # BLD AUTO: 0.2 X10E3/UL (ref 0–0.4)
EOSINOPHIL NFR BLD AUTO: 4 %
ERYTHROCYTE [DISTWIDTH] IN BLOOD BY AUTOMATED COUNT: 19.9 % (ref 12.3–15.4)
GLOBULIN SER CALC-MCNC: 2.9 G/DL (ref 1.5–4.5)
GLUCOSE SERPL-MCNC: 135 MG/DL (ref 65–99)
HBA1C MFR BLD: 7 % (ref 4.8–5.6)
HCT VFR BLD AUTO: 30 % (ref 34–46.6)
HGB BLD-MCNC: 9.9 G/DL (ref 11.1–15.9)
IMM GRANULOCYTES # BLD: 0 X10E3/UL (ref 0–0.1)
IMM GRANULOCYTES NFR BLD: 0 %
LYMPHOCYTES # BLD AUTO: 2.2 X10E3/UL (ref 0.7–3.1)
LYMPHOCYTES NFR BLD AUTO: 39 %
MCH RBC QN AUTO: 31.6 PG (ref 26.6–33)
MCHC RBC AUTO-ENTMCNC: 33 G/DL (ref 31.5–35.7)
MCV RBC AUTO: 96 FL (ref 79–97)
MONOCYTES # BLD AUTO: 0.1 X10E3/UL (ref 0.1–0.9)
MONOCYTES NFR BLD AUTO: 3 %
NEUTROPHILS # BLD AUTO: 3 X10E3/UL (ref 1.4–7)
NEUTROPHILS NFR BLD AUTO: 53 %
PLATELET # BLD AUTO: 358 X10E3/UL (ref 150–379)
POTASSIUM SERPL-SCNC: 5.2 MMOL/L (ref 3.5–5.2)
PROT SERPL-MCNC: 6.9 G/DL (ref 6–8.5)
RBC # BLD AUTO: 3.13 X10E6/UL (ref 3.77–5.28)
SODIUM SERPL-SCNC: 142 MMOL/L (ref 134–144)
T4 FREE SERPL-MCNC: 1.5 NG/DL (ref 0.82–1.77)
TSH SERPL DL<=0.005 MIU/L-ACNC: 6.04 UIU/ML (ref 0.45–4.5)
WBC # BLD AUTO: 5.7 X10E3/UL (ref 3.4–10.8)

## 2018-07-26 RX ORDER — ATORVASTATIN CALCIUM 10 MG/1
TABLET, FILM COATED ORAL
Qty: 90 TABLET | Refills: 2 | Status: ON HOLD | OUTPATIENT
Start: 2018-07-26 | End: 2018-11-20

## 2018-07-31 RX ORDER — PEN NEEDLE, DIABETIC 29 G X1/2"
NEEDLE, DISPOSABLE MISCELLANEOUS
Qty: 100 EACH | Refills: 10 | Status: ON HOLD | OUTPATIENT
Start: 2018-07-31 | End: 2018-11-20

## 2018-08-01 ENCOUNTER — TELEPHONE (OUTPATIENT)
Dept: INTERNAL MEDICINE | Facility: CLINIC | Age: 78
End: 2018-08-01

## 2018-08-01 RX ORDER — LEVOTHYROXINE SODIUM 88 UG/1
88 TABLET ORAL DAILY
Qty: 30 TABLET | Refills: 3 | Status: SHIPPED | OUTPATIENT
Start: 2018-08-01 | End: 2018-11-30 | Stop reason: SDUPTHER

## 2018-08-01 NOTE — TELEPHONE ENCOUNTER
----- Message from Gaviota Bowles MA sent at 7/31/2018  2:37 PM EDT -----  Regarding: FW: MEDICATIONS      ----- Message -----  From: Deb Corea  Sent: 7/31/2018   1:58 PM  To: [facility]  Subject: MEDICATIONS                                      [facility]    Patient wanted to let Johnna know that she has a pharmacy that is going to take care of her meds now. They are going to set them up in a tray and bring them to her.    Thanks!  deb

## 2018-08-06 RX ORDER — ATORVASTATIN CALCIUM 10 MG/1
TABLET, FILM COATED ORAL
Qty: 90 TABLET | Refills: 0 | Status: SHIPPED | OUTPATIENT
Start: 2018-08-06 | End: 2018-08-10 | Stop reason: SDUPTHER

## 2018-08-09 ENCOUNTER — TELEPHONE (OUTPATIENT)
Dept: INTERNAL MEDICINE | Facility: CLINIC | Age: 78
End: 2018-08-09

## 2018-08-09 NOTE — TELEPHONE ENCOUNTER
"----- Message from Johnna John MA sent at 8/9/2018 11:32 AM EDT -----  Regarding: FW: APPOINTMENT  Contact: [phone]  If this is an acute issue then schedule in an acute spot. Thanks.   ----- Message -----  From: Gaviota Bowles MA  Sent: 8/9/2018  10:36 AM  To: Chari Mercado MD, Johnna John MA  Subject: FW: APPOINTMENT                                      ----- Message -----  From: Deb Corea  Sent: 8/9/2018  10:22 AM  To: [facility] [place]  Subject: APPOINTMENT                                      [facility]      Patient called to ask if she can come in tomorrow, Friday, late afternoon for her carpel tunnel. She wants pain meds.  The only appointments left for tomorrow afternoon are \"same day\" appointments.    Kind regards,  deb    "

## 2018-08-10 ENCOUNTER — TELEPHONE (OUTPATIENT)
Dept: INTERNAL MEDICINE | Facility: CLINIC | Age: 78
End: 2018-08-10

## 2018-08-10 ENCOUNTER — OFFICE VISIT (OUTPATIENT)
Dept: INTERNAL MEDICINE | Facility: CLINIC | Age: 78
End: 2018-08-10

## 2018-08-10 VITALS
HEIGHT: 63 IN | OXYGEN SATURATION: 97 % | HEART RATE: 95 BPM | DIASTOLIC BLOOD PRESSURE: 86 MMHG | SYSTOLIC BLOOD PRESSURE: 126 MMHG

## 2018-08-10 DIAGNOSIS — E11.43 TYPE 2 DIABETES MELLITUS WITH DIABETIC AUTONOMIC NEUROPATHY, WITH LONG-TERM CURRENT USE OF INSULIN (HCC): ICD-10-CM

## 2018-08-10 DIAGNOSIS — G56.01 CARPAL TUNNEL SYNDROME OF RIGHT WRIST: Primary | ICD-10-CM

## 2018-08-10 DIAGNOSIS — M25.511 ACUTE PAIN OF RIGHT SHOULDER: ICD-10-CM

## 2018-08-10 DIAGNOSIS — Z79.4 TYPE 2 DIABETES MELLITUS WITH DIABETIC AUTONOMIC NEUROPATHY, WITH LONG-TERM CURRENT USE OF INSULIN (HCC): ICD-10-CM

## 2018-08-10 DIAGNOSIS — M25.561 ACUTE PAIN OF RIGHT KNEE: ICD-10-CM

## 2018-08-10 PROCEDURE — 99214 OFFICE O/P EST MOD 30 MIN: CPT | Performed by: INTERNAL MEDICINE

## 2018-08-10 RX ORDER — HYDROCODONE BITARTRATE AND ACETAMINOPHEN 5; 325 MG/1; MG/1
1 TABLET ORAL EVERY 4 HOURS PRN
Qty: 20 TABLET | Refills: 0 | Status: SHIPPED | OUTPATIENT
Start: 2018-08-10 | End: 2018-08-21 | Stop reason: SDUPTHER

## 2018-08-10 RX ORDER — HYDROCODONE BITARTRATE AND ACETAMINOPHEN 5; 325 MG/1; MG/1
1 TABLET ORAL EVERY 4 HOURS PRN
Qty: 20 TABLET | Refills: 0 | Status: SHIPPED | OUTPATIENT
Start: 2018-08-10 | End: 2018-08-10 | Stop reason: SDUPTHER

## 2018-08-10 RX ORDER — DULOXETIN HYDROCHLORIDE 30 MG/1
30 CAPSULE, DELAYED RELEASE ORAL 2 TIMES DAILY
COMMUNITY
Start: 2018-08-06 | End: 2018-12-26 | Stop reason: SDUPTHER

## 2018-08-10 RX ORDER — METHYLPREDNISOLONE 4 MG/1
TABLET ORAL
Qty: 21 TABLET | Refills: 0 | Status: SHIPPED | OUTPATIENT
Start: 2018-08-10 | End: 2018-10-10

## 2018-08-10 NOTE — TELEPHONE ENCOUNTER
I spoke with Sarah at [facility] on 8/2/2018 , they are taking Ms. Singh her medication tray on Wednesdays.   ----- Message from Gaviota Bowles MA sent at 7/31/2018  2:37 PM EDT -----  Regarding: FW: MEDICATIONS      ----- Message -----  From: Deb Corea  Sent: 7/31/2018   1:58 PM  To: [facility]  Subject: MEDICATIONS                                      [facility]    Patient wanted to let Johnna know that she has a pharmacy that is going to take care of her meds now. They are going to set them up in a tray and bring them to her.    Thanks!  deb

## 2018-08-10 NOTE — PROGRESS NOTES
Joya Singh is a 78 y.o. female, who presents with a chief complaint of   Chief Complaint   Patient presents with   • Carpal Tunnel     R side, she has seen Dr. Mercado about this in the past,she has an appt with REY Ryder on Tuesday.    • Knee Pain     L knee, she went to [place] about two weeks ago, another person that was with her fell and landed on her L knee.        HPI   The pt is here bc of a flare of her carpal tunnel.  She is wearing a brace on her wrist which helps some.  Her right shoulder hurts and the pain goes all down her arm.      Left knee pain - she was on vacation in [place] and someone fell on her knee.      Her glucoses have been fairly well controlled.  She brought her meter in for review. Last a1c 7.0    The following portions of the patient's history were reviewed and updated as appropriate: allergies, current medications, past family history, past medical history, past social history, past surgical history and problem list.    Allergies: Lisinopril; Morphine and related; Baclofen; Codeine; Morphine; and Penicillins    Review of Systems   Constitutional: Negative.    HENT: Negative.    Eyes: Negative.    Respiratory: Negative.    Cardiovascular: Negative.    Gastrointestinal: Negative.    Endocrine: Negative.    Genitourinary: Negative.    Musculoskeletal: Positive for neck pain.        Right arm pain  Left knee     Skin: Negative.    Allergic/Immunologic: Negative.    Neurological: Negative.    Hematological: Negative.    Psychiatric/Behavioral: Negative.    All other systems reviewed and are negative.            Wt Readings from Last 3 Encounters:   07/17/18 93 kg (205 lb)   06/25/18 94.8 kg (209 lb)   06/05/18 95.3 kg (210 lb)     Temp Readings from Last 3 Encounters:   04/13/18 98.1 °F (36.7 °C) (Oral)   03/16/18 98.2 °F (36.8 °C)   02/21/18 98.6 °F (37 °C)     BP Readings from Last 3 Encounters:   08/10/18 126/86   07/17/18 120/78   06/25/18 141/83     Pulse  Readings from Last 3 Encounters:   08/10/18 95   07/17/18 84   06/25/18 80     There is no height or weight on file to calculate BMI.  @LASTSAO2(3)@    Physical Exam   Constitutional: She is oriented to person, place, and time. She appears well-developed and well-nourished. No distress.   HENT:   Head: Normocephalic and atraumatic.   Right Ear: External ear normal.   Left Ear: External ear normal.   Nose: Nose normal.   Mouth/Throat: Oropharynx is clear and moist.   Eyes: Pupils are equal, round, and reactive to light. Conjunctivae and EOM are normal.   Neck: Normal range of motion. Neck supple.   Cardiovascular: Normal rate, regular rhythm, normal heart sounds and intact distal pulses.    Pulmonary/Chest: Effort normal and breath sounds normal. No respiratory distress. She has no wheezes.   Musculoskeletal: She exhibits no edema.   In wheelchair  Left knee with effusion and ttp  Right wrist in brace   Neurological: She is alert and oriented to person, place, and time.   Skin: Skin is warm and dry.   Psychiatric: She has a normal mood and affect. Her behavior is normal. Judgment and thought content normal.   Nursing note and vitals reviewed.      Results for orders placed or performed in visit on 07/17/18   Comprehensive Metabolic Panel   Result Value Ref Range    Glucose 135 (H) 65 - 99 mg/dL    BUN 21 8 - 27 mg/dL    Creatinine 1.71 (H) 0.57 - 1.00 mg/dL    eGFR Non African Am 29 (L) >59 mL/min/1.73    eGFR  Am 33 (L) >59 mL/min/1.73    BUN/Creatinine Ratio 12 12 - 28    Sodium 142 134 - 144 mmol/L    Potassium 5.2 3.5 - 5.2 mmol/L    Chloride 105 96 - 106 mmol/L    Total CO2 26 20 - 29 mmol/L    Calcium 9.2 8.7 - 10.3 mg/dL    Total Protein 6.9 6.0 - 8.5 g/dL    Albumin 4.0 3.5 - 4.8 g/dL    Globulin 2.9 1.5 - 4.5 g/dL    A/G Ratio 1.4 1.2 - 2.2    Total Bilirubin 0.4 0.0 - 1.2 mg/dL    Alkaline Phosphatase 146 (H) 39 - 117 IU/L    AST (SGOT) 10 0 - 40 IU/L    ALT (SGPT) 8 0 - 32 IU/L   T4, Free   Result  Value Ref Range    Free T4 1.50 0.82 - 1.77 ng/dL   TSH   Result Value Ref Range    TSH 6.040 (H) 0.450 - 4.500 uIU/mL   Hemoglobin A1c   Result Value Ref Range    Hemoglobin A1C 7.0 (H) 4.8 - 5.6 %   CBC & Differential   Result Value Ref Range    WBC 5.7 3.4 - 10.8 x10E3/uL    RBC 3.13 (L) 3.77 - 5.28 x10E6/uL    Hemoglobin 9.9 (L) 11.1 - 15.9 g/dL    Hematocrit 30.0 (L) 34.0 - 46.6 %    MCV 96 79 - 97 fL    MCH 31.6 26.6 - 33.0 pg    MCHC 33.0 31.5 - 35.7 g/dL    RDW 19.9 (H) 12.3 - 15.4 %    Platelets 358 150 - 379 x10E3/uL    Neutrophil Rel % 53 Not Estab. %    Lymphocyte Rel % 39 Not Estab. %    Monocyte Rel % 3 Not Estab. %    Eosinophil Rel % 4 Not Estab. %    Basophil Rel % 1 Not Estab. %    Neutrophils Absolute 3.0 1.4 - 7.0 x10E3/uL    Lymphocytes Absolute 2.2 0.7 - 3.1 x10E3/uL    Monocytes Absolute 0.1 0.1 - 0.9 x10E3/uL    Eosinophils Absolute 0.2 0.0 - 0.4 x10E3/uL    Basophils Absolute 0.1 0.0 - 0.2 x10E3/uL    Immature Granulocyte Rel % 0 Not Estab. %    Immature Grans Absolute 0.0 0.0 - 0.1 x10E3/uL           Joya was seen today for carpal tunnel and knee pain.    Diagnoses and all orders for this visit:    Carpal tunnel syndrome of right wrist  -     MethylPREDNISolone (MEDROL, ANIL,) 4 MG tablet; Take as directed on package instructions.    Acute pain of right knee  -     MethylPREDNISolone (MEDROL, ANIL,) 4 MG tablet; Take as directed on package instructions.  -     Discontinue: HYDROcodone-acetaminophen (NORCO) 5-325 MG per tablet; Take 1 tablet by mouth Every 4 (Four) Hours As Needed for Severe Pain .  -     HYDROcodone-acetaminophen (NORCO) 5-325 MG per tablet; Take 1 tablet by mouth Every 4 (Four) Hours As Needed for Severe Pain .    Acute pain of right shoulder  -     MethylPREDNISolone (MEDROL, ANIL,) 4 MG tablet; Take as directed on package instructions.  -     Discontinue: HYDROcodone-acetaminophen (NORCO) 5-325 MG per tablet; Take 1 tablet by mouth Every 4 (Four) Hours As Needed for Severe  Pain .  -     HYDROcodone-acetaminophen (NORCO) 5-325 MG per tablet; Take 1 tablet by mouth Every 4 (Four) Hours As Needed for Severe Pain .    Type 2 diabetes mellitus with diabetic autonomic neuropathy, with long-term current use of insulin (CMS/[facility])        Increase novolog to 25, 30, 30 units with meals while on steroid pack.  Go back to regular dosing when steroids done.     Outpatient Medications Prior to Visit   Medication Sig Dispense Refill   • ACCU-CHEK FASTCLIX LANCETS misc TEST 3-4 TIMES DAILY AS DIRECTED 120 each 10   • ACCU-CHEK SMARTVIEW test strip TEST blood sugar three times daily OR AS DIRECTED 300 each 3   • acetaminophen (TYLENOL) 325 MG tablet Take 2 tablets by mouth Every 6 (Six) Hours As Needed for Mild Pain . OTC product 40 tablet 0   • Alcohol Swabs (B-D SINGLE USE SWABS REGULAR) pads      • apixaban (ELIQUIS) 2.5 MG tablet tablet Take 1 tablet by mouth 2 (Two) Times a Day. 60 tablet 5   • aspirin 325 MG tablet Take 325 mg by mouth Daily.     • atorvastatin (LIPITOR) 10 MG tablet Take one (1) tablet orally (by mouth) once daily 90 tablet 2   • Blood Glucose Monitoring Suppl (ACCU-CHEK KENNETH SMARTVIEW) w/Device kit TEST blood sugar three times daily or as directed 1 kit 0   • budesonide (PULMICORT) 0.25 MG/2ML nebulizer solution      • buPROPion SR (WELLBUTRIN SR) 150 MG 12 hr tablet Take 150 mg by mouth Every Night.     • Calcium Carb-Cholecalciferol (CALCIUM PLUS VITAMIN D3 PO) Take 1 tablet by mouth Every Morning.     • colchicine 0.6 MG tablet 2 pills po now then 1 pill po 1 hour later for gout 3 tablet 0   • Cyanocobalamin (VITAMELTS ENERGY VITAMIN B-12) 1500 MCG tablet dispersible Take 1 tablet by mouth Every Morning.     • cyclobenzaprine (FLEXERIL) 10 MG tablet Take one (1) tablet orally (by mouth) three times daily as needed MUSCLE SPASMS 30 tablet 3   • Elastic Bandages & Supports (CARPAL TUNNEL WRIST STABILIZER) misc 1 each Daily. 1 each 0   • furosemide (LASIX) 20 MG tablet Take one  "(1) tablet orally (by mouth) once daily 90 tablet 3   • gabapentin (NEURONTIN) 400 MG capsule Take one (1) capsule orally (by mouth) each morning then one (1) in the afternoon and two (2) each night at bedtime 120 capsule 5   • insulin aspart (NOVOLOG FLEXPEN) 100 UNIT/ML solution pen-injector sc pen 20 units sq before breakfast then 25 units sq before lunch and dinner (Patient taking differently: 3 (Three) Times a Day With Meals. 20 units sq before breakfast then 25 units sq before lunch and dinner) 15 mL 6   • Insulin Degludec (TRESIBA FLEXTOUCH) 200 UNIT/ML solution pen-injector Inject 50 Units under the skin Daily. 3 pen 11   • levothyroxine (SYNTHROID) 88 MCG tablet Take 1 tablet by mouth Daily. 30 tablet 3   • melatonin 5 MG tablet tablet Take 5 mg by mouth At Night As Needed.     • Needle, Disp, (BD DISP NEEDLES) 30G X 1/2\" misc To be used 3 times daily with Novolog Flexpen. 100 each 5   • nystatin (MYCOSTATIN) 406964 UNIT/GM powder Apply  topically 3 (Three) Times a Day. 56.7 g 1   • O2 (OXYGEN) Inhale 2 L/min Every Night.     • omeprazole (priLOSEC) 40 MG capsule Take one (1) capsule orally (by mouth) once daily 90 capsule 1   • ORTHOVISC 30 MG/2ML solution prefilled syringe injection      • potassium chloride (K-DUR) 10 MEQ CR tablet Take one (1) tablet orally (by mouth) once daily 90 tablet 0   • thiamine (VITAMIN B-1) 100 MG tablet Take 100 mg by mouth Daily.     • ULTICARE MICRO PEN NEEDLES 32G X 4 MM misc      • ULTICARE MINI PEN NEEDLES 31G X 6 MM misc USE TO INJECT INSULINS 4 TIMES DAILY AS DIRECTED 100 each 10   • ULTICARE PEN NEEDLES 29G X 12MM misc      • atorvastatin (LIPITOR) 10 MG tablet Take one (1) tablet orally (by mouth) once daily 90 tablet 0     No facility-administered medications prior to visit.      New Medications Ordered This Visit   Medications   • MethylPREDNISolone (MEDROL, ANIL,) 4 MG tablet     Sig: Take as directed on package instructions.     Dispense:  21 tablet     Refill:  0 "   • HYDROcodone-acetaminophen (NORCO) 5-325 MG per tablet     Sig: Take 1 tablet by mouth Every 4 (Four) Hours As Needed for Severe Pain .     Dispense:  20 tablet     Refill:  0     [unfilled]  Medications Discontinued During This Encounter   Medication Reason   • atorvastatin (LIPITOR) 10 MG tablet Duplicate order   • HYDROcodone-acetaminophen (NORCO) 5-325 MG per tablet Reorder         Return in about 1 month (around 9/10/2018) for Recheck.

## 2018-08-13 RX ORDER — CYCLOBENZAPRINE HCL 10 MG
TABLET ORAL
Qty: 30 TABLET | Refills: 0 | Status: SHIPPED | OUTPATIENT
Start: 2018-08-13 | End: 2018-09-06 | Stop reason: SDUPTHER

## 2018-08-21 DIAGNOSIS — M25.561 ACUTE PAIN OF RIGHT KNEE: ICD-10-CM

## 2018-08-21 DIAGNOSIS — M25.511 ACUTE PAIN OF RIGHT SHOULDER: ICD-10-CM

## 2018-08-22 DIAGNOSIS — E03.9 ACQUIRED HYPOTHYROIDISM: Primary | ICD-10-CM

## 2018-08-22 LAB
T4 FREE SERPL-MCNC: 1.65 NG/DL (ref 0.93–1.7)
TSH SERPL DL<=0.005 MIU/L-ACNC: 2.18 MIU/ML (ref 0.27–4.2)

## 2018-08-22 RX ORDER — HYDROCODONE BITARTRATE AND ACETAMINOPHEN 5; 325 MG/1; MG/1
1 TABLET ORAL EVERY 4 HOURS PRN
Qty: 20 TABLET | Refills: 0 | Status: SHIPPED | OUTPATIENT
Start: 2018-08-22 | End: 2018-09-24 | Stop reason: SDUPTHER

## 2018-08-23 ENCOUNTER — TELEPHONE (OUTPATIENT)
Dept: INTERNAL MEDICINE | Facility: CLINIC | Age: 78
End: 2018-08-23

## 2018-08-23 NOTE — TELEPHONE ENCOUNTER
Phone off, no voicemail set up.   ----- Message from Chari Mercado MD sent at 8/22/2018  5:04 PM EDT -----  Call pt about labs.  Repeat labs normal

## 2018-08-23 NOTE — TELEPHONE ENCOUNTER
Sent to pharmacy,   not set up        ----- Message from Gaviota Bowles MA sent at 8/21/2018  3:46 PM EDT -----  Pt  Called needs refill on n hydrocodone    #20 take one every  4 hours, lf  8/10/18  At [facility], pt wanted to  script in the morning.    This needs to be sent to    Pharmacy    lov   8/10/18   fredo       [phone]

## 2018-09-06 RX ORDER — CYCLOBENZAPRINE HCL 10 MG
TABLET ORAL
Qty: 30 TABLET | Refills: 0 | Status: SHIPPED | OUTPATIENT
Start: 2018-09-06 | End: 2018-09-29 | Stop reason: SDUPTHER

## 2018-09-10 ENCOUNTER — TELEPHONE (OUTPATIENT)
Dept: INTERNAL MEDICINE | Facility: CLINIC | Age: 78
End: 2018-09-10

## 2018-09-10 NOTE — TELEPHONE ENCOUNTER
Patient scheduled for tomorrow.  ----- Message from Beth Farley MA sent at 9/10/2018 11:14 AM EDT -----  Regarding: FW: SICK PT      ----- Message -----  From: Heydi Jackson  Sent: 9/10/2018  10:59 AM  To: [facility]  Subject: SICK PT                                          [place]    Patient phones stating she is very sick and feels like it may be strep throat.      Wants to see dr burnham this afternoon.

## 2018-09-11 ENCOUNTER — OFFICE VISIT (OUTPATIENT)
Dept: INTERNAL MEDICINE | Facility: CLINIC | Age: 78
End: 2018-09-11

## 2018-09-11 VITALS
OXYGEN SATURATION: 91 % | DIASTOLIC BLOOD PRESSURE: 80 MMHG | HEART RATE: 84 BPM | HEIGHT: 63 IN | TEMPERATURE: 98.1 F | SYSTOLIC BLOOD PRESSURE: 118 MMHG

## 2018-09-11 DIAGNOSIS — J06.9 ACUTE URI: Primary | ICD-10-CM

## 2018-09-11 PROCEDURE — 99213 OFFICE O/P EST LOW 20 MIN: CPT | Performed by: INTERNAL MEDICINE

## 2018-09-11 RX ORDER — FLUTICASONE PROPIONATE 50 MCG
2 SPRAY, SUSPENSION (ML) NASAL DAILY
Qty: 1 BOTTLE | Refills: 2 | Status: SHIPPED | OUTPATIENT
Start: 2018-09-11 | End: 2018-10-11

## 2018-09-11 RX ORDER — BENZONATATE 200 MG/1
200 CAPSULE ORAL 3 TIMES DAILY PRN
Qty: 20 CAPSULE | Refills: 0 | Status: SHIPPED | OUTPATIENT
Start: 2018-09-11 | End: 2018-10-10

## 2018-09-11 NOTE — PROGRESS NOTES
Joya Singh is a 78 y.o. female, who presents with a chief complaint of   Chief Complaint   Patient presents with   • Sore Throat     X3days, dizziness, cough, congestion, has been sleeping a lot, no fever, nausea, vomiting, or diarrhea.        HPI   Pt here with her son bc of 3 days of not feeling well. She has been tired and had sore throat and cough.  No fever.  No n/v/d/c.  + sick contact at home.      The following portions of the patient's history were reviewed and updated as appropriate: allergies, current medications, past family history, past medical history, past social history, past surgical history and problem list.    Allergies: Lisinopril; Morphine and related; Baclofen; Codeine; Morphine; and Penicillins    Review of Systems   Constitutional: Positive for fatigue.   HENT: Positive for congestion and sore throat.    Eyes: Negative.    Respiratory: Positive for cough.    Cardiovascular: Negative.    Gastrointestinal: Negative.    Endocrine: Negative.    Genitourinary: Negative.    Musculoskeletal: Negative.    Skin: Negative.    Allergic/Immunologic: Negative.    Neurological: Negative.    Hematological: Negative.    Psychiatric/Behavioral: Negative.    All other systems reviewed and are negative.            Wt Readings from Last 3 Encounters:   07/17/18 93 kg (205 lb)   06/25/18 94.8 kg (209 lb)   06/05/18 95.3 kg (210 lb)     Temp Readings from Last 3 Encounters:   09/11/18 98.1 °F (36.7 °C)   04/13/18 98.1 °F (36.7 °C) (Oral)   03/16/18 98.2 °F (36.8 °C)     BP Readings from Last 3 Encounters:   09/11/18 118/80   08/10/18 126/86   07/17/18 120/78     Pulse Readings from Last 3 Encounters:   09/11/18 84   08/10/18 95   07/17/18 84     There is no height or weight on file to calculate BMI.  @LASTSAO2(3)@    Physical Exam   Constitutional: She is oriented to person, place, and time. She appears well-developed and well-nourished.   HENT:   Head: Normocephalic and atraumatic.   Right Ear: External ear  normal.   Left Ear: External ear normal.   Bilateral serous effusion without erythema   Eyes: Conjunctivae and lids are normal.   Neck: Normal range of motion. Neck supple.   Cardiovascular: Normal rate and regular rhythm.    Pulmonary/Chest: Effort normal and breath sounds normal. No respiratory distress. She has no wheezes.   Musculoskeletal: Normal range of motion. She exhibits no edema.   Lymphadenopathy:     She has cervical adenopathy.   Neurological: She is alert and oriented to person, place, and time. No cranial nerve deficit.   Skin: Skin is warm and dry. No rash noted.   Psychiatric: She has a normal mood and affect. Her behavior is normal. Thought content normal.   Nursing note and vitals reviewed.      Results for orders placed or performed in visit on 08/22/18   TSH   Result Value Ref Range    TSH 2.180 0.270 - 4.200 mIU/mL   T4, free   Result Value Ref Range    Free T4 1.65 0.93 - 1.70 ng/dL           Joya was seen today for sore throat.    Diagnoses and all orders for this visit:    Acute URI  -     fluticasone (FLONASE) 50 MCG/ACT nasal spray; 2 sprays into the nostril(s) as directed by provider Daily for 30 days. Administer 2 sprays in each nostril for each dose.  -     benzonatate (TESSALON) 200 MG capsule; Take 1 capsule by mouth 3 (Three) Times a Day As Needed for Cough.          Outpatient Medications Prior to Visit   Medication Sig Dispense Refill   • ACCU-CHEK FASTCLIX LANCETS misc TEST 3-4 TIMES DAILY AS DIRECTED 120 each 10   • ACCU-CHEK SMARTVIEW test strip TEST blood sugar three times daily OR AS DIRECTED 300 each 3   • acetaminophen (TYLENOL) 325 MG tablet Take 2 tablets by mouth Every 6 (Six) Hours As Needed for Mild Pain . OTC product 40 tablet 0   • Alcohol Swabs (B-D SINGLE USE SWABS REGULAR) pads      • apixaban (ELIQUIS) 2.5 MG tablet tablet Take 1 tablet by mouth 2 (Two) Times a Day. 60 tablet 5   • aspirin 325 MG tablet Take 325 mg by mouth Daily.     • atorvastatin (LIPITOR) 10  MG tablet Take one (1) tablet orally (by mouth) once daily 90 tablet 2   • Blood Glucose Monitoring Suppl (ACCU-CHEK KENNETH SMARTVIEW) w/Device kit TEST blood sugar three times daily or as directed 1 kit 0   • budesonide (PULMICORT) 0.25 MG/2ML nebulizer solution      • buPROPion SR (WELLBUTRIN SR) 150 MG 12 hr tablet Take 150 mg by mouth Every Night.     • Calcium Carb-Cholecalciferol (CALCIUM PLUS VITAMIN D3 PO) Take 1 tablet by mouth Every Morning.     • colchicine 0.6 MG tablet 2 pills po now then 1 pill po 1 hour later for gout 3 tablet 0   • Cyanocobalamin (VITAMELTS ENERGY VITAMIN B-12) 1500 MCG tablet dispersible Take 1 tablet by mouth Every Morning.     • cyclobenzaprine (FLEXERIL) 10 MG tablet Take one (1) tablet orally (by mouth) three times daily as needed formuscle spasms 30 tablet 0   • DULoxetine (CYMBALTA) 30 MG capsule      • Elastic Bandages & Supports (CARPAL TUNNEL WRIST STABILIZER) misc 1 each Daily. 1 each 0   • furosemide (LASIX) 20 MG tablet Take one (1) tablet orally (by mouth) once daily 90 tablet 3   • gabapentin (NEURONTIN) 400 MG capsule Take one (1) capsule orally (by mouth) each morning then one (1) in the afternoon and two (2) each night at bedtime 120 capsule 5   • HYDROcodone-acetaminophen (NORCO) 5-325 MG per tablet Take 1 tablet by mouth Every 4 (Four) Hours As Needed for Severe Pain . 20 tablet 0   • insulin aspart (NOVOLOG FLEXPEN) 100 UNIT/ML solution pen-injector sc pen 20 units sq before breakfast then 25 units sq before lunch and dinner (Patient taking differently: 3 (Three) Times a Day With Meals. 20 units sq before breakfast then 25 units sq before lunch and dinner) 15 mL 6   • Insulin Degludec (TRESIBA FLEXTOUCH) 200 UNIT/ML solution pen-injector Inject 50 Units under the skin Daily. 3 pen 11   • levothyroxine (SYNTHROID) 88 MCG tablet Take 1 tablet by mouth Daily. 30 tablet 3   • melatonin 5 MG tablet tablet Take 5 mg by mouth At Night As Needed.     • MethylPREDNISolone  "(MEDROLANIL,) 4 MG tablet Take as directed on package instructions. 21 tablet 0   • Needle, Disp, (BD DISP NEEDLES) 30G X 1/2\" misc To be used 3 times daily with Novolog Flexpen. 100 each 5   • nystatin (MYCOSTATIN) 004245 UNIT/GM powder Apply  topically 3 (Three) Times a Day. 56.7 g 1   • O2 (OXYGEN) Inhale 2 L/min Every Night.     • omeprazole (priLOSEC) 40 MG capsule Take one (1) capsule orally (by mouth) once daily 90 capsule 1   • ORTHOVISC 30 MG/2ML solution prefilled syringe injection      • potassium chloride (K-DUR) 10 MEQ CR tablet Take one (1) tablet orally (by mouth) once daily 90 tablet 0   • thiamine (VITAMIN B-1) 100 MG tablet Take 100 mg by mouth Daily.     • ULTICARE MICRO PEN NEEDLES 32G X 4 MM misc      • ULTICARE MINI PEN NEEDLES 31G X 6 MM misc USE TO INJECT INSULINS 4 TIMES DAILY AS DIRECTED 100 each 10   • ULTICARE PEN NEEDLES 29G X 12MM misc        No facility-administered medications prior to visit.      New Medications Ordered This Visit   Medications   • fluticasone (FLONASE) 50 MCG/ACT nasal spray     Si sprays into the nostril(s) as directed by provider Daily for 30 days. Administer 2 sprays in each nostril for each dose.     Dispense:  1 bottle     Refill:  2   • benzonatate (TESSALON) 200 MG capsule     Sig: Take 1 capsule by mouth 3 (Three) Times a Day As Needed for Cough.     Dispense:  20 capsule     Refill:  0     Ok for cap or tablet     [unfilled]  There are no discontinued medications.      Return if symptoms worsen or fail to improve.  "

## 2018-09-14 ENCOUNTER — OFFICE VISIT (OUTPATIENT)
Dept: ORTHOPEDIC SURGERY | Facility: CLINIC | Age: 78
End: 2018-09-14

## 2018-09-14 DIAGNOSIS — R52 PAIN: Primary | ICD-10-CM

## 2018-09-14 DIAGNOSIS — S83.232D COMPLEX TEAR OF MEDIAL MENISCUS OF LEFT KNEE AS CURRENT INJURY, SUBSEQUENT ENCOUNTER: ICD-10-CM

## 2018-09-14 DIAGNOSIS — M84.469K INSUFFICIENCY FRACTURE OF TIBIA WITH NONUNION, SUBSEQUENT ENCOUNTER: ICD-10-CM

## 2018-09-14 DIAGNOSIS — M17.12 PRIMARY OSTEOARTHRITIS OF LEFT KNEE: ICD-10-CM

## 2018-09-14 PROCEDURE — 73562 X-RAY EXAM OF KNEE 3: CPT | Performed by: ORTHOPAEDIC SURGERY

## 2018-09-14 PROCEDURE — 99214 OFFICE O/P EST MOD 30 MIN: CPT | Performed by: ORTHOPAEDIC SURGERY

## 2018-09-14 NOTE — PROGRESS NOTES
Subjective:     Patient ID: Joya Singh is a 78 y.o. female.    Chief Complaint:  Follow-up left knee pain, DJD  History of Present Illness  Joya Singh returns to clinic today for evaluation of left knee, states that over the last 2-3 months her pain is Significantly worse than rates her pain now as a 9-10 out of 10 on a daily basis, exacerbated with any attempts at weightbearing, localized to medial joint line of her left knee.  Does note occasional giving way episodes when she does weight-bear.  Moderate swelling does wax and wane.  Denies any fevers chills or sweats.  Does note moderate swelling but does also wax and wane.  Does note associated crepitus on activities and range of motion.  Denies any jeffry locking or catching.  She has had very minimal short-term improvement with intra-articular injections, anti-inflammatory medications, home exercise program, and has focused on weight loss over the last 6 months.     Social History     Occupational History   • Not on file.     Social History Main Topics   • Smoking status: Never Smoker   • Smokeless tobacco: Never Used      Comment: CAFFEINE USE: NONE   • Alcohol use No   • Drug use: No   • Sexual activity: Defer      Comment: EXERCISE - RARELY      Past Medical History:   Diagnosis Date   • Allergic rhinitis    • Anxiety    • Arthritis    • Bell's palsy    • CKD (chronic kidney disease) stage 3, GFR 30-59 ml/min    • Depression    • Diabetes mellitus (CMS/HCC)     LAST A1C 6   • Diabetic gastroparesis (CMS/HCC) 2/19/2016   • GERD (gastroesophageal reflux disease)    • H/O blood clots     LEFT LEG 7 OR 8 YEARS AGO   • Hyperlipidemia    • Hypertension    • Hypothyroidism    • Normal coronary arteries     by cath 2013   • AARON (obstructive sleep apnea)     DOESNT WEAR REGULARLY   • PONV (postoperative nausea and vomiting)    • Skin cancer    • Stroke (CMS/HCC)    • TIA (transient ischemic attack)     LAST TIA JULY 2017     Past Surgical History:   Procedure Laterality  Date   • BACK SURGERY      HARDWARE   • CHOLECYSTECTOMY      OPEN   • COLONOSCOPY  2011    due for repeat in 2021   • HYSTERECTOMY      PARTIAL    • NECK SURGERY     • SPINE SURGERY     • UPPER GASTROINTESTINAL ENDOSCOPY  2014    gastritis.  done by dr. hastings       Family History   Problem Relation Age of Onset   • Lupus Mother    • Heart failure Mother 59   • Heart disease Other    • Hypertension Other    • Heart attack Father          Review of Systems   Constitutional: Negative for chills, diaphoresis, fever and unexpected weight change.   HENT: Negative for hearing loss, nosebleeds, sore throat and tinnitus.    Eyes: Negative for pain and visual disturbance.   Respiratory: Negative for cough, shortness of breath and wheezing.    Cardiovascular: Negative for chest pain and palpitations.   Gastrointestinal: Negative for abdominal pain, diarrhea, nausea and vomiting.   Endocrine: Negative for cold intolerance, heat intolerance and polydipsia.   Genitourinary: Negative for difficulty urinating, dysuria and hematuria.   Musculoskeletal: Positive for arthralgias and myalgias. Negative for joint swelling.   Skin: Negative for rash and wound.   Allergic/Immunologic: Negative for environmental allergies.   Neurological: Negative for dizziness, syncope and numbness.   Hematological: Does not bruise/bleed easily.   Psychiatric/Behavioral: Negative for dysphoric mood and sleep disturbance. The patient is not nervous/anxious.            Objective:  There were no vitals filed for this visit.  There were no vitals filed for this visit.  There is no height or weight on file to calculate BMI.  General: No acute distress.  Resp: normal respiratory effort  Skin: no rashes or wounds; normal turgor  Psych: mood and affect appropriate; recent and remote memory intact         Ortho Exam    Left knee- active range of motion 5-120°, 4+ out of 5 strength on flexion and extension, maximal tenderness palpation along medial joint line with  overall varus alignment on standing, positive El exam with pain along medial joint line, no click, positive external compression test, negative patellar apprehension test.  Stable to varus and valgus stress at 5 and 30°.  No left hip pain on logroll or Stinchfield exam, negative straight leg raise left lower extremity.  Baseline decreased sensation left foot symmetric to the right, 1+ dorsalis pedis pulse.    Imaging:  Left Knee X-Ray  Indication: Pain    AP, Lateral, and [place] views    Findings:  No fracture  No bony lesion  Normal soft tissues  Tricompartmental osteoarthritis with significant medial compartment with near bone-on-bone articulation overall varus alignment noted, subchondral sclerosis noted at the far medial margin of the medial tibial plateau.    Compared to prior office x-rays.    Assessment:       1. Pain    2. Primary osteoarthritis of left knee    3. Insufficiency fracture of tibia with nonunion, subsequent encounter    4. Complex tear of medial meniscus of left knee as current injury, subsequent encounter          Plan:          Discussed treatment options at length with patient at today's visit.  Given failure other conservative treatment options as well as persistence of pain and significant effects on basic activities of daily living with associated pain and crepitus, patient wished to proceed with left total knee arthroplasty this point time.    I reviewed anatomy and a model of a total knee arthroplasty, as well as typical postoperative recovery of 6-12 months before [facility] recovery, and possible need for rehabilitation stay after hospitalization. We also discussed risks, benefits, and alternatives of procedure with risks including but not limited to neurovascular damage, bleeding, infection, malalignment, chronic pian, failure of implants, osteolysis, loosening of implants, loss of motion, weakness, stiffness, instability, DVT, pulmonary embolus, death, stroke, complex regional  pain syndrome, myocardial infarction, and need for additional procedures. Patient understood all these and had all questions answered before consenting to the procedure. No guarantees were given in regards to results from the surgery. We will have patient medically optimized by their primary care physician and plan on proceeding with surgery at next available date.     Patient does have history of a stroke 1 year ago, she did have a DVT in her left leg approximately 10 years ago.  We will treat DVT prophylaxis with Eliquis POSTOPERATIVELY.      Joya Singh was in agreement with plan and had all questions answered.     Orders:  Orders Placed This Encounter   Procedures   • MRSA Screen Culture - Swab, Nares   • XR Knee 3 View Left   • Urinalysis With Culture If Indicated - Urine, Clean Catch   • Basic metabolic panel   • Protime-INR   • APTT   • Hemoglobin A1c   • Consult Primary Care Physician for Medical Clearance   • Follow anesthesia standing orders.   • Provide instructions to patient regarding NPO status   • Clorhexidine skin prep   • Care Order / Instruction for all Female Patients   • ECG 12 Lead   • CBC and Differential       Medications:  No orders of the defined types were placed in this encounter.      Followup:  No Follow-up on file.    Joya was seen today for follow-up and pain.    Diagnoses and all orders for this visit:    Pain  -     XR Knee 3 View Left    Primary osteoarthritis of left knee  -     Case Request; Standing  -     Consult Primary Care Physician for Medical Clearance  -     Urinalysis With Culture If Indicated - Urine, Clean Catch; Future  -     acetaminophen (TYLENOL) tablet 975 mg; Take 3 tablets by mouth 1 (One) Time.  -     meloxicam (MOBIC) tablet 15 mg; Take 2 tablets by mouth 1 (One) Time.  -     oxyCODONE (oxyCONTIN) 12 hr tablet 10 mg; Take 1 tablet by mouth 1 (One) Time.  -     bupivacaine liposome (EXPAREL) 1.3 % injection 266 mg; Inject 20 mL as directed 1 (One) Time.  -     CBC  and Differential; Future  -     Basic metabolic panel; Future  -     Protime-INR; Future  -     APTT; Future  -     MRSA Screen Culture - Swab, Nares; Future  -     Hemoglobin A1c; Future  -     ECG 12 Lead; Future  -     Tranexamic Acid 2,000 mg in sodium chloride 0.9 % 100 mL; Apply 2,000 mg topically to the appropriate area as directed 1 (One) Time.  -     Case Request    Insufficiency fracture of tibia with nonunion, subsequent encounter  -     Case Request; Standing  -     Consult Primary Care Physician for Medical Clearance  -     Urinalysis With Culture If Indicated - Urine, Clean Catch; Future  -     acetaminophen (TYLENOL) tablet 975 mg; Take 3 tablets by mouth 1 (One) Time.  -     meloxicam (MOBIC) tablet 15 mg; Take 2 tablets by mouth 1 (One) Time.  -     oxyCODONE (oxyCONTIN) 12 hr tablet 10 mg; Take 1 tablet by mouth 1 (One) Time.  -     bupivacaine liposome (EXPAREL) 1.3 % injection 266 mg; Inject 20 mL as directed 1 (One) Time.  -     CBC and Differential; Future  -     Basic metabolic panel; Future  -     Protime-INR; Future  -     APTT; Future  -     MRSA Screen Culture - Swab, Nares; Future  -     Hemoglobin A1c; Future  -     ECG 12 Lead; Future  -     Tranexamic Acid 2,000 mg in sodium chloride 0.9 % 100 mL; Apply 2,000 mg topically to the appropriate area as directed 1 (One) Time.  -     Case Request    Complex tear of medial meniscus of left knee as current injury, subsequent encounter  -     Case Request; Standing  -     Consult Primary Care Physician for Medical Clearance  -     Urinalysis With Culture If Indicated - Urine, Clean Catch; Future  -     acetaminophen (TYLENOL) tablet 975 mg; Take 3 tablets by mouth 1 (One) Time.  -     meloxicam (MOBIC) tablet 15 mg; Take 2 tablets by mouth 1 (One) Time.  -     oxyCODONE (oxyCONTIN) 12 hr tablet 10 mg; Take 1 tablet by mouth 1 (One) Time.  -     bupivacaine liposome (EXPAREL) 1.3 % injection 266 mg; Inject 20 mL as directed 1 (One) Time.  -      CBC and Differential; Future  -     Basic metabolic panel; Future  -     Protime-INR; Future  -     APTT; Future  -     MRSA Screen Culture - Swab, Nares; Future  -     Hemoglobin A1c; Future  -     ECG 12 Lead; Future  -     Tranexamic Acid 2,000 mg in sodium chloride 0.9 % 100 mL; Apply 2,000 mg topically to the appropriate area as directed 1 (One) Time.  -     Case Request    Other orders  -     Inpatient Admission; Standing  -     Follow anesthesia standing orders.  -     Provide instructions to patient regarding NPO status  -     Clorhexidine skin prep  -     Care Order / Instruction for all Female Patients  -     Follow anesthesia standing orders.; Standing  -     Verify NPO Status; Standing  -     SCD (sequential compression device)- to be placed on patient in Pre-op; Standing  -     Clip operative site; Standing  -     Obtain informed consent (if not collected inpatient or PAT); Standing  -     Type & Screen; Standing  -     Inpatient Consult to Hospitalist; Standing          Dictated utilizing Dragon dictation

## 2018-09-24 DIAGNOSIS — M25.511 ACUTE PAIN OF RIGHT SHOULDER: ICD-10-CM

## 2018-09-24 DIAGNOSIS — M25.561 ACUTE PAIN OF RIGHT KNEE: ICD-10-CM

## 2018-09-24 RX ORDER — HYDROCODONE BITARTRATE AND ACETAMINOPHEN 5; 325 MG/1; MG/1
1 TABLET ORAL EVERY 4 HOURS PRN
Qty: 20 TABLET | Refills: 0 | Status: SHIPPED | OUTPATIENT
Start: 2018-09-24 | End: 2018-10-16

## 2018-09-27 RX ORDER — ACETAMINOPHEN 325 MG/1
1000 TABLET ORAL ONCE
Status: CANCELLED | OUTPATIENT
Start: 2018-09-27 | End: 2018-09-27

## 2018-09-27 RX ORDER — MELOXICAM 7.5 MG/1
15 TABLET ORAL ONCE
Status: CANCELLED | OUTPATIENT
Start: 2018-09-27 | End: 2018-09-27

## 2018-09-27 RX ORDER — OXYCODONE HCL 10 MG/1
10 TABLET, FILM COATED, EXTENDED RELEASE ORAL ONCE
Status: CANCELLED | OUTPATIENT
Start: 2018-09-27 | End: 2018-09-27

## 2018-09-28 ENCOUNTER — OFFICE VISIT (OUTPATIENT)
Dept: INTERNAL MEDICINE | Facility: CLINIC | Age: 78
End: 2018-09-28

## 2018-09-28 VITALS
SYSTOLIC BLOOD PRESSURE: 124 MMHG | HEART RATE: 80 BPM | OXYGEN SATURATION: 96 % | DIASTOLIC BLOOD PRESSURE: 80 MMHG | HEIGHT: 63 IN

## 2018-09-28 DIAGNOSIS — E11.22 CKD STAGE 3 DUE TO TYPE 2 DIABETES MELLITUS (HCC): ICD-10-CM

## 2018-09-28 DIAGNOSIS — E11.43 TYPE 2 DIABETES MELLITUS WITH DIABETIC AUTONOMIC NEUROPATHY, WITH LONG-TERM CURRENT USE OF INSULIN (HCC): ICD-10-CM

## 2018-09-28 DIAGNOSIS — N18.30 CKD STAGE 3 DUE TO TYPE 2 DIABETES MELLITUS (HCC): ICD-10-CM

## 2018-09-28 DIAGNOSIS — E78.2 MIXED HYPERLIPIDEMIA: ICD-10-CM

## 2018-09-28 DIAGNOSIS — Z79.4 TYPE 2 DIABETES MELLITUS WITH DIABETIC AUTONOMIC NEUROPATHY, WITH LONG-TERM CURRENT USE OF INSULIN (HCC): ICD-10-CM

## 2018-09-28 DIAGNOSIS — R42 DIZZINESS: Primary | ICD-10-CM

## 2018-09-28 DIAGNOSIS — Z23 NEEDS FLU SHOT: ICD-10-CM

## 2018-09-28 DIAGNOSIS — I10 ESSENTIAL HYPERTENSION: ICD-10-CM

## 2018-09-28 DIAGNOSIS — I82.499 DEEP VEIN THROMBOSIS (DVT) OF OTHER VEIN OF LOWER EXTREMITY, UNSPECIFIED CHRONICITY, UNSPECIFIED LATERALITY (HCC): ICD-10-CM

## 2018-09-28 LAB
ALBUMIN SERPL-MCNC: 3.9 G/DL (ref 3.5–5.2)
ALBUMIN/GLOB SERPL: 1.3 G/DL
ALP SERPL-CCNC: 127 U/L (ref 40–129)
ALT SERPL-CCNC: <5 U/L (ref 5–33)
AST SERPL-CCNC: 6 U/L (ref 5–32)
BASOPHILS # BLD AUTO: 0.07 10*3/MM3 (ref 0–0.2)
BASOPHILS NFR BLD AUTO: 1.3 % (ref 0–2)
BILIRUB SERPL-MCNC: 0.5 MG/DL (ref 0.2–1.2)
BUN SERPL-MCNC: 25 MG/DL (ref 8–23)
BUN/CREAT SERPL: 13 (ref 7–25)
CALCIUM SERPL-MCNC: 9 MG/DL (ref 8.8–10.5)
CHLORIDE SERPL-SCNC: 102 MMOL/L (ref 98–107)
CO2 SERPL-SCNC: 31.1 MMOL/L (ref 22–29)
CREAT SERPL-MCNC: 1.92 MG/DL (ref 0.57–1)
EOSINOPHIL # BLD AUTO: 0.28 10*3/MM3 (ref 0.1–0.3)
EOSINOPHIL NFR BLD AUTO: 5.3 % (ref 0–4)
ERYTHROCYTE [DISTWIDTH] IN BLOOD BY AUTOMATED COUNT: 20 % (ref 11.5–14.5)
GLOBULIN SER CALC-MCNC: 3.1 GM/DL
GLUCOSE SERPL-MCNC: 50 MG/DL (ref 65–99)
HCT VFR BLD AUTO: 28 % (ref 37–47)
HGB BLD-MCNC: 8.7 G/DL (ref 12–16)
IMM GRANULOCYTES # BLD: 0.03 10*3/MM3 (ref 0–0.03)
IMM GRANULOCYTES NFR BLD: 0.6 % (ref 0–0.5)
LYMPHOCYTES # BLD AUTO: 1.96 10*3/MM3 (ref 0.6–4.8)
LYMPHOCYTES NFR BLD AUTO: 37.1 % (ref 20–45)
MCH RBC QN AUTO: 32 PG (ref 27–31)
MCHC RBC AUTO-ENTMCNC: 31.1 G/DL (ref 31–37)
MCV RBC AUTO: 102.9 FL (ref 81–99)
MONOCYTES # BLD AUTO: 0.29 10*3/MM3 (ref 0–1)
MONOCYTES NFR BLD AUTO: 5.5 % (ref 3–8)
NEUTROPHILS # BLD AUTO: 2.66 10*3/MM3 (ref 1.5–8.3)
NEUTROPHILS NFR BLD AUTO: 50.2 % (ref 45–70)
NRBC BLD AUTO-RTO: 0 /100 WBC (ref 0–0)
PLATELET # BLD AUTO: 406 10*3/MM3 (ref 140–500)
POTASSIUM SERPL-SCNC: 4.1 MMOL/L (ref 3.5–5.2)
PROT SERPL-MCNC: 7 G/DL (ref 6–8.5)
RBC # BLD AUTO: 2.72 10*6/MM3 (ref 4.2–5.4)
SODIUM SERPL-SCNC: 144 MMOL/L (ref 136–145)
WBC # BLD AUTO: 5.29 10*3/MM3 (ref 4.8–10.8)

## 2018-09-28 PROCEDURE — G0008 ADMIN INFLUENZA VIRUS VAC: HCPCS | Performed by: INTERNAL MEDICINE

## 2018-09-28 PROCEDURE — 99214 OFFICE O/P EST MOD 30 MIN: CPT | Performed by: INTERNAL MEDICINE

## 2018-09-28 PROCEDURE — 90662 IIV NO PRSV INCREASED AG IM: CPT | Performed by: INTERNAL MEDICINE

## 2018-09-28 NOTE — PROGRESS NOTES
Joya Singh is a 78 y.o. female, who presents with a chief complaint of   Chief Complaint   Patient presents with   • Dizziness     was the worst on Wednesday, she has to hold on to something every time she stands up or goes to walk anywhere. She also is having some nausea when she is standing.        HPI   Pt is here bc of dizziness.  She is dizzy all the time.  She saw neurology about a year ago and says they told her they couldn't do anything to help her.  She hasnt been to vestibular PT. She hasnt fallen recently but has in the past and has had lots of close calls.      Dm - pt reports her glucoses have been fairly well controlled.  She was high yesterday and took novolog then had a low of 33.      Pt having a problem swallowing her potassium pill.  K+ levels have been borderline high and she has ckd.  I told her to stop the pills.    chronic anemia - stable on last labs.  No recent known blood loss    The following portions of the patient's history were reviewed and updated as appropriate: allergies, current medications, past family history, past medical history, past social history, past surgical history and problem list.    Allergies: Lisinopril; Morphine and related; Baclofen; Codeine; Morphine; and Penicillins    Review of Systems   Constitutional: Negative.    HENT: Negative.    Eyes: Negative.    Respiratory: Negative.    Cardiovascular: Negative.    Gastrointestinal: Negative.    Endocrine: Negative.    Genitourinary: Negative.    Musculoskeletal: Negative.    Skin: Negative.    Allergic/Immunologic: Negative.    Neurological: Positive for dizziness.   Hematological: Negative.    Psychiatric/Behavioral: Negative.    All other systems reviewed and are negative.            Wt Readings from Last 3 Encounters:   07/17/18 93 kg (205 lb)   06/25/18 94.8 kg (209 lb)   06/05/18 95.3 kg (210 lb)     Temp Readings from Last 3 Encounters:   09/11/18 98.1 °F (36.7 °C)   04/13/18 98.1 °F (36.7 °C) (Oral)    03/16/18 98.2 °F (36.8 °C)     BP Readings from Last 3 Encounters:   09/28/18 124/80   09/11/18 118/80   08/10/18 126/86     Pulse Readings from Last 3 Encounters:   09/28/18 80   09/11/18 84   08/10/18 95     There is no height or weight on file to calculate BMI.  @LASTSAO2(3)@    Physical Exam   Constitutional: She is oriented to person, place, and time. She appears well-developed and well-nourished. No distress.   HENT:   Head: Normocephalic and atraumatic.   Right Ear: External ear normal.   Left Ear: External ear normal.   Nose: Nose normal.   Mouth/Throat: Oropharynx is clear and moist.   Eyes: Pupils are equal, round, and reactive to light. Conjunctivae and EOM are normal.   Neck: Normal range of motion. Neck supple.   Cardiovascular: Normal rate, regular rhythm, normal heart sounds and intact distal pulses.    Pulmonary/Chest: Effort normal and breath sounds normal. No respiratory distress. She has no wheezes.   Musculoskeletal: Normal range of motion.   Normal gait   Neurological: She is alert and oriented to person, place, and time.   Skin: Skin is warm and dry.   Psychiatric: She has a normal mood and affect. Her behavior is normal. Judgment and thought content normal.   Nursing note and vitals reviewed.      Results for orders placed or performed in visit on 08/22/18   TSH   Result Value Ref Range    TSH 2.180 0.270 - 4.200 mIU/mL   T4, free   Result Value Ref Range    Free T4 1.65 0.93 - 1.70 ng/dL           Joya was seen today for dizziness.    Diagnoses and all orders for this visit:    Dizziness  -     Cancel: Comprehensive Metabolic Panel; Future  -     Cancel: CBC & Differential; Future  -     Ambulatory Referral to Physical Therapy Evaluate and treat, Vestibular  -     Comprehensive Metabolic Panel  -     CBC & Differential    Needs flu shot  -     Flu Vaccine High Dose PF 65YR+ ([id number])    Deep vein thrombosis (DVT) of other vein of lower extremity, unspecified chronicity, unspecified  laterality (CMS/HCC)    Mixed hyperlipidemia  -     Comprehensive Metabolic Panel  -     CBC & Differential    Essential hypertension  -     Cancel: Comprehensive Metabolic Panel; Future  -     Cancel: CBC & Differential; Future  -     Comprehensive Metabolic Panel  -     CBC & Differential    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)  -     Cancel: Comprehensive Metabolic Panel; Future  -     Cancel: CBC & Differential; Future  -     Comprehensive Metabolic Panel  -     CBC & Differential    Type 2 diabetes mellitus with diabetic autonomic neuropathy, with long-term current use of insulin (CMS/HCC)  -     Cancel: Comprehensive Metabolic Panel; Future  -     Cancel: CBC & Differential; Future  -     Comprehensive Metabolic Panel  -     CBC & Differential      Cont current meds.      Outpatient Medications Prior to Visit   Medication Sig Dispense Refill   • ACCU-CHEK FASTCLIX LANCETS misc TEST 3-4 TIMES DAILY AS DIRECTED 120 each 10   • ACCU-CHEK SMARTVIEW test strip TEST blood sugar three times daily OR AS DIRECTED 300 each 3   • acetaminophen (TYLENOL) 325 MG tablet Take 2 tablets by mouth Every 6 (Six) Hours As Needed for Mild Pain . OTC product 40 tablet 0   • Alcohol Swabs (B-D SINGLE USE SWABS REGULAR) pads      • apixaban (ELIQUIS) 2.5 MG tablet tablet Take 1 tablet by mouth 2 (Two) Times a Day. 60 tablet 5   • aspirin 325 MG tablet Take 325 mg by mouth Daily.     • atorvastatin (LIPITOR) 10 MG tablet Take one (1) tablet orally (by mouth) once daily 90 tablet 2   • benzonatate (TESSALON) 200 MG capsule Take 1 capsule by mouth 3 (Three) Times a Day As Needed for Cough. 20 capsule 0   • Blood Glucose Monitoring Suppl (ACCU-CHEK KENNETH SMARTVIEW) w/Device kit TEST blood sugar three times daily or as directed 1 kit 0   • budesonide (PULMICORT) 0.25 MG/2ML nebulizer solution      • buPROPion SR (WELLBUTRIN SR) 150 MG 12 hr tablet Take 150 mg by mouth Every Night.     • Calcium Carb-Cholecalciferol  "(CALCIUM PLUS VITAMIN D3 PO) Take 1 tablet by mouth Every Morning.     • colchicine 0.6 MG tablet 2 pills po now then 1 pill po 1 hour later for gout 3 tablet 0   • Cyanocobalamin (VITAMELTS ENERGY VITAMIN B-12) 1500 MCG tablet dispersible Take 1 tablet by mouth Every Morning.     • cyclobenzaprine (FLEXERIL) 10 MG tablet Take one (1) tablet orally (by mouth) three times daily as needed formuscle spasms 30 tablet 0   • DULoxetine (CYMBALTA) 30 MG capsule      • Elastic Bandages & Supports (CARPAL TUNNEL WRIST STABILIZER) misc 1 each Daily. 1 each 0   • fluticasone (FLONASE) 50 MCG/ACT nasal spray 2 sprays into the nostril(s) as directed by provider Daily for 30 days. Administer 2 sprays in each nostril for each dose. 1 bottle 2   • furosemide (LASIX) 20 MG tablet Take one (1) tablet orally (by mouth) once daily 90 tablet 3   • gabapentin (NEURONTIN) 400 MG capsule Take one (1) capsule orally (by mouth) each morning then one (1) in the afternoon and two (2) each night at bedtime 120 capsule 5   • HYDROcodone-acetaminophen (NORCO) 5-325 MG per tablet Take 1 tablet by mouth Every 4 (Four) Hours As Needed for Severe Pain . 20 tablet 0   • insulin aspart (NOVOLOG FLEXPEN) 100 UNIT/ML solution pen-injector sc pen 20 units sq before breakfast then 25 units sq before lunch and dinner (Patient taking differently: 3 (Three) Times a Day With Meals. 20 units sq before breakfast then 25 units sq before lunch and dinner) 15 mL 6   • Insulin Degludec (TRESIBA FLEXTOUCH) 200 UNIT/ML solution pen-injector Inject 50 Units under the skin Daily. 3 pen 11   • levothyroxine (SYNTHROID) 88 MCG tablet Take 1 tablet by mouth Daily. 30 tablet 3   • melatonin 5 MG tablet tablet Take 5 mg by mouth At Night As Needed.     • MethylPREDNISolone (MEDROL, ANIL,) 4 MG tablet Take as directed on package instructions. 21 tablet 0   • Needle, Disp, (BD DISP NEEDLES) 30G X 1/2\" misc To be used 3 times daily with Novolog Flexpen. 100 each 5   • nystatin " (MYCOSTATIN) 533047 UNIT/GM powder Apply  topically 3 (Three) Times a Day. 56.7 g 1   • O2 (OXYGEN) Inhale 2 L/min Every Night.     • omeprazole (priLOSEC) 40 MG capsule Take one (1) capsule orally (by mouth) once daily 90 capsule 1   • ORTHOVISC 30 MG/2ML solution prefilled syringe injection      • thiamine (VITAMIN B-1) 100 MG tablet Take 100 mg by mouth Daily.     • ULTICARE MICRO PEN NEEDLES 32G X 4 MM misc      • ULTICARE MINI PEN NEEDLES 31G X 6 MM misc USE TO INJECT INSULINS 4 TIMES DAILY AS DIRECTED 100 each 10   • ULTICARE PEN NEEDLES 29G X 12MM misc      • potassium chloride (K-DUR) 10 MEQ CR tablet Take one (1) tablet orally (by mouth) once daily 90 tablet 0     No facility-administered medications prior to visit.      No orders of the defined types were placed in this encounter.    [unfilled]  Medications Discontinued During This Encounter   Medication Reason   • potassium chloride (K-DUR) 10 MEQ CR tablet          Return in about 1 month (around 10/28/2018) for Recheck.

## 2018-09-29 DIAGNOSIS — E11.43 TYPE 2 DIABETES MELLITUS WITH AUTONOMIC NEUROPATHY (HCC): ICD-10-CM

## 2018-10-01 ENCOUNTER — TELEPHONE (OUTPATIENT)
Dept: INTERNAL MEDICINE | Facility: CLINIC | Age: 78
End: 2018-10-01

## 2018-10-01 RX ORDER — LANCETS
EACH MISCELLANEOUS
Qty: 400 EACH | Refills: 3 | Status: SHIPPED | OUTPATIENT
Start: 2018-10-01

## 2018-10-01 RX ORDER — CYCLOBENZAPRINE HCL 10 MG
TABLET ORAL
Qty: 30 TABLET | Refills: 0 | Status: SHIPPED | OUTPATIENT
Start: 2018-10-01 | End: 2018-10-16

## 2018-10-01 NOTE — TELEPHONE ENCOUNTER
"Patient has been advised and voiced understanding. Patient states she is still dizzy and does not want to go to the ER. Patient states \"the ER cannot do anything for me, I have been dizzy for a long time, I am use to it.\" I advised I would speak with Dr. Mercado today regarding patient.     ----- Message from Chari Mercado MD sent at 9/28/2018  4:45 PM EDT -----  Call pt about labs.  Pt's hgb down some has she had any bleeding issues?  If any worsening of sx to ER over the weekend.    "

## 2018-10-04 ENCOUNTER — HOSPITAL ENCOUNTER (OUTPATIENT)
Dept: CARDIOLOGY | Facility: HOSPITAL | Age: 78
Discharge: HOME | End: 2018-10-04
Admitting: INTERNAL MEDICINE

## 2018-10-04 ENCOUNTER — HOSPITAL ENCOUNTER (OUTPATIENT)
Dept: CARDIOLOGY | Facility: HOSPITAL | Age: 78
Discharge: HOME | End: 2018-10-04
Attending: INTERNAL MEDICINE

## 2018-10-04 VITALS
BODY MASS INDEX: 35.97 KG/M2 | WEIGHT: 203 LBS | HEIGHT: 63 IN | DIASTOLIC BLOOD PRESSURE: 67 MMHG | SYSTOLIC BLOOD PRESSURE: 116 MMHG | OXYGEN SATURATION: 95 %

## 2018-10-04 VITALS — HEART RATE: 90 BPM

## 2018-10-04 DIAGNOSIS — M84.469K INSUFFICIENCY FRACTURE OF TIBIA WITH NONUNION, SUBSEQUENT ENCOUNTER: ICD-10-CM

## 2018-10-04 DIAGNOSIS — R06.02 SHORTNESS OF BREATH: ICD-10-CM

## 2018-10-04 DIAGNOSIS — S83.232D COMPLEX TEAR OF MEDIAL MENISCUS OF LEFT KNEE AS CURRENT INJURY, SUBSEQUENT ENCOUNTER: ICD-10-CM

## 2018-10-04 DIAGNOSIS — M17.12 PRIMARY OSTEOARTHRITIS OF LEFT KNEE: ICD-10-CM

## 2018-10-04 DIAGNOSIS — R42 DIZZINESS: ICD-10-CM

## 2018-10-04 LAB
ALBUMIN SERPL-MCNC: 3.5 G/DL (ref 3.5–5.2)
ALBUMIN/GLOB SERPL: 1.1 G/DL
ALP SERPL-CCNC: 114 U/L (ref 39–117)
ALT SERPL W P-5'-P-CCNC: 6 U/L (ref 1–33)
ANION GAP SERPL CALCULATED.3IONS-SCNC: 13.2 MMOL/L
ASCENDING AORTA: 3.1 CM
AST SERPL-CCNC: 6 U/L (ref 1–32)
BASOPHILS # BLD AUTO: 0.07 10*3/MM3 (ref 0–0.2)
BASOPHILS NFR BLD AUTO: 1.3 % (ref 0–1.5)
BH CV ECHO MEAS - ACS: 1.8 CM
BH CV ECHO MEAS - AI DEC SLOPE: 238.9 CM/SEC^2
BH CV ECHO MEAS - AI MAX PG: 36.5 MMHG
BH CV ECHO MEAS - AI MAX VEL: 301.9 CM/SEC
BH CV ECHO MEAS - AI P1/2T: 370 MSEC
BH CV ECHO MEAS - AO MAX PG (FULL): 6 MMHG
BH CV ECHO MEAS - AO MAX PG: 12.1 MMHG
BH CV ECHO MEAS - AO MEAN PG (FULL): 4 MMHG
BH CV ECHO MEAS - AO MEAN PG: 7.1 MMHG
BH CV ECHO MEAS - AO ROOT AREA (BSA CORRECTED): 1.6
BH CV ECHO MEAS - AO ROOT AREA: 7.2 CM^2
BH CV ECHO MEAS - AO ROOT DIAM: 3 CM
BH CV ECHO MEAS - AO V2 MAX: 173.7 CM/SEC
BH CV ECHO MEAS - AO V2 MEAN: 124.5 CM/SEC
BH CV ECHO MEAS - AO V2 VTI: 35.1 CM
BH CV ECHO MEAS - AVA(I,A): 2.1 CM^2
BH CV ECHO MEAS - AVA(I,D): 2.1 CM^2
BH CV ECHO MEAS - AVA(V,A): 1.9 CM^2
BH CV ECHO MEAS - AVA(V,D): 1.9 CM^2
BH CV ECHO MEAS - BSA(HAYCOCK): 2.1 M^2
BH CV ECHO MEAS - BSA: 1.9 M^2
BH CV ECHO MEAS - BZI_BMI: 37.1 KILOGRAMS/M^2
BH CV ECHO MEAS - BZI_METRIC_HEIGHT: 157.5 CM
BH CV ECHO MEAS - BZI_METRIC_WEIGHT: 92.1 KG
BH CV ECHO MEAS - EDV(MOD-SP2): 55 ML
BH CV ECHO MEAS - EDV(MOD-SP4): 62 ML
BH CV ECHO MEAS - EDV(TEICH): 111.6 ML
BH CV ECHO MEAS - EF(CUBED): 87.2 %
BH CV ECHO MEAS - EF(MOD-BP): 67 %
BH CV ECHO MEAS - EF(MOD-SP2): 69.1 %
BH CV ECHO MEAS - EF(MOD-SP4): 64.5 %
BH CV ECHO MEAS - EF(TEICH): 80.8 %
BH CV ECHO MEAS - ESV(MOD-SP2): 17 ML
BH CV ECHO MEAS - ESV(MOD-SP4): 22 ML
BH CV ECHO MEAS - ESV(TEICH): 21.4 ML
BH CV ECHO MEAS - IVS/LVPW: 1.1
BH CV ECHO MEAS - IVSD: 1 CM
BH CV ECHO MEAS - LAT PEAK E' VEL: 6 CM/SEC
BH CV ECHO MEAS - LV DIASTOLIC VOL/BSA (35-75): 32.2 ML/M^2
BH CV ECHO MEAS - LV MASS(C)D: 169.9 GRAMS
BH CV ECHO MEAS - LV MASS(C)DI: 88.3 GRAMS/M^2
BH CV ECHO MEAS - LV MAX PG: 6.1 MMHG
BH CV ECHO MEAS - LV MEAN PG: 3.1 MMHG
BH CV ECHO MEAS - LV SYSTOLIC VOL/BSA (12-30): 11.4 ML/M^2
BH CV ECHO MEAS - LV V1 MAX: 123.6 CM/SEC
BH CV ECHO MEAS - LV V1 MEAN: 81.1 CM/SEC
BH CV ECHO MEAS - LV V1 VTI: 28.3 CM
BH CV ECHO MEAS - LVIDD: 4.9 CM
BH CV ECHO MEAS - LVIDS: 2.5 CM
BH CV ECHO MEAS - LVLD AP2: 6.2 CM
BH CV ECHO MEAS - LVLD AP4: 6.4 CM
BH CV ECHO MEAS - LVLS AP2: 5.3 CM
BH CV ECHO MEAS - LVLS AP4: 5.9 CM
BH CV ECHO MEAS - LVOT AREA (M): 2.5 CM^2
BH CV ECHO MEAS - LVOT AREA: 2.7 CM^2
BH CV ECHO MEAS - LVOT DIAM: 1.8 CM
BH CV ECHO MEAS - LVPWD: 0.92 CM
BH CV ECHO MEAS - MED PEAK E' VEL: 6 CM/SEC
BH CV ECHO MEAS - MR MAX PG: 48.3 MMHG
BH CV ECHO MEAS - MR MAX VEL: 347.5 CM/SEC
BH CV ECHO MEAS - MV A DUR: 0.11 SEC
BH CV ECHO MEAS - MV A MAX VEL: 102.3 CM/SEC
BH CV ECHO MEAS - MV DEC SLOPE: 240.1 CM/SEC^2
BH CV ECHO MEAS - MV DEC TIME: 0.25 SEC
BH CV ECHO MEAS - MV E MAX VEL: 65 CM/SEC
BH CV ECHO MEAS - MV E/A: 0.64
BH CV ECHO MEAS - MV MAX PG: 7.7 MMHG
BH CV ECHO MEAS - MV MEAN PG: 3.9 MMHG
BH CV ECHO MEAS - MV P1/2T MAX VEL: 63.5 CM/SEC
BH CV ECHO MEAS - MV P1/2T: 77.5 MSEC
BH CV ECHO MEAS - MV V2 MAX: 139 CM/SEC
BH CV ECHO MEAS - MV V2 MEAN: 93 CM/SEC
BH CV ECHO MEAS - MV V2 VTI: 29.1 CM
BH CV ECHO MEAS - MVA P1/2T LCG: 3.5 CM^2
BH CV ECHO MEAS - MVA(P1/2T): 2.8 CM^2
BH CV ECHO MEAS - MVA(VTI): 2.6 CM^2
BH CV ECHO MEAS - PA MAX PG (FULL): 2.1 MMHG
BH CV ECHO MEAS - PA MAX PG: 6.1 MMHG
BH CV ECHO MEAS - PA V2 MAX: 123.5 CM/SEC
BH CV ECHO MEAS - PULM A REVS DUR: 0.11 SEC
BH CV ECHO MEAS - PULM A REVS VEL: 29.6 CM/SEC
BH CV ECHO MEAS - PULM DIAS VEL: 47.1 CM/SEC
BH CV ECHO MEAS - PULM S/D: 1.1
BH CV ECHO MEAS - PULM SYS VEL: 50.4 CM/SEC
BH CV ECHO MEAS - RAP SYSTOLE: 3 MMHG
BH CV ECHO MEAS - RV MAX PG: 4 MMHG
BH CV ECHO MEAS - RV MEAN PG: 2.2 MMHG
BH CV ECHO MEAS - RV V1 MAX: 100.4 CM/SEC
BH CV ECHO MEAS - RV V1 MEAN: 69 CM/SEC
BH CV ECHO MEAS - RV V1 VTI: 23.1 CM
BH CV ECHO MEAS - RVSP: 16 MMHG
BH CV ECHO MEAS - SI(AO): 131.9 ML/M^2
BH CV ECHO MEAS - SI(CUBED): 52.6 ML/M^2
BH CV ECHO MEAS - SI(LVOT): 39 ML/M^2
BH CV ECHO MEAS - SI(MOD-SP2): 19.8 ML/M^2
BH CV ECHO MEAS - SI(MOD-SP4): 20.8 ML/M^2
BH CV ECHO MEAS - SI(TEICH): 46.9 ML/M^2
BH CV ECHO MEAS - SUP REN AO DIAM: 1.7 CM
BH CV ECHO MEAS - SV(AO): 253.7 ML
BH CV ECHO MEAS - SV(CUBED): 101.2 ML
BH CV ECHO MEAS - SV(LVOT): 75 ML
BH CV ECHO MEAS - SV(MOD-SP2): 38 ML
BH CV ECHO MEAS - SV(MOD-SP4): 40 ML
BH CV ECHO MEAS - SV(TEICH): 90.2 ML
BH CV ECHO MEAS - TAPSE (>1.6): 2 CM2
BH CV ECHO MEAS - TR MAX VEL: 181.9 CM/SEC
BH CV ECHO MEASUREMENTS AVERAGE E/E' RATIO: 10.83
BH CV XLRA - RV BASE: 2.6 CM
BH CV XLRA - TDI S': 10 CM/SEC
BILIRUB SERPL-MCNC: 0.5 MG/DL (ref 0.1–1.2)
BUN BLD-MCNC: 19 MG/DL (ref 8–23)
BUN/CREAT SERPL: 10.9 (ref 7–25)
CALCIUM SPEC-SCNC: 8.8 MG/DL (ref 8.6–10.5)
CHLORIDE SERPL-SCNC: 100 MMOL/L (ref 98–107)
CO2 SERPL-SCNC: 27.8 MMOL/L (ref 22–29)
CREAT BLD-MCNC: 1.74 MG/DL (ref 0.57–1)
D DIMER PPP FEU-MCNC: 0.45 MCGFEU/ML (ref 0–0.49)
DEPRECATED RDW RBC AUTO: 69.7 FL (ref 37–54)
EOSINOPHIL # BLD AUTO: 0.33 10*3/MM3 (ref 0–0.7)
EOSINOPHIL NFR BLD AUTO: 6.1 % (ref 0.3–6.2)
ERYTHROCYTE [DISTWIDTH] IN BLOOD BY AUTOMATED COUNT: 19.6 % (ref 11.7–13)
GFR SERPL CREATININE-BSD FRML MDRD: 28 ML/MIN/1.73
GLOBULIN UR ELPH-MCNC: 3.3 GM/DL
GLUCOSE BLD-MCNC: 154 MG/DL (ref 65–99)
HCT VFR BLD AUTO: 27.4 % (ref 35.6–45.5)
HGB BLD-MCNC: 8.5 G/DL (ref 11.9–15.5)
IMM GRANULOCYTES # BLD: 0.04 10*3/MM3 (ref 0–0.03)
IMM GRANULOCYTES NFR BLD: 0.7 % (ref 0–0.5)
LEFT ATRIUM VOLUME INDEX: 17 ML/M2
LYMPHOCYTES # BLD AUTO: 2.14 10*3/MM3 (ref 0.9–4.8)
LYMPHOCYTES NFR BLD AUTO: 39.5 % (ref 19.6–45.3)
MAXIMAL PREDICTED HEART RATE: 144 BPM
MCH RBC QN AUTO: 30.9 PG (ref 26.9–32)
MCHC RBC AUTO-ENTMCNC: 31 G/DL (ref 32.4–36.3)
MCV RBC AUTO: 99.6 FL (ref 80.5–98.2)
MONOCYTES # BLD AUTO: 0.29 10*3/MM3 (ref 0.2–1.2)
MONOCYTES NFR BLD AUTO: 5.4 % (ref 5–12)
NEUTROPHILS # BLD AUTO: 2.55 10*3/MM3 (ref 1.9–8.1)
NEUTROPHILS NFR BLD AUTO: 47 % (ref 42.7–76)
NRBC BLD MANUAL-RTO: 0 /100 WBC (ref 0–0)
NT-PROBNP SERPL-MCNC: 340.7 PG/ML (ref 0–1800)
PLATELET # BLD AUTO: 397 10*3/MM3 (ref 140–500)
PMV BLD AUTO: 10.8 FL (ref 6–12)
POTASSIUM BLD-SCNC: 3.8 MMOL/L (ref 3.5–5.2)
PROT SERPL-MCNC: 6.8 G/DL (ref 6–8.5)
RBC # BLD AUTO: 2.75 10*6/MM3 (ref 3.9–5.2)
SINUS: 2.3 CM
SODIUM BLD-SCNC: 141 MMOL/L (ref 136–145)
STJ: 2.6 CM
STRESS TARGET HR: 122 BPM
TROPONIN T SERPL-MCNC: <0.01 NG/ML (ref 0–0.03)
WBC NRBC COR # BLD: 5.42 10*3/MM3 (ref 4.5–10.7)

## 2018-10-04 PROCEDURE — 83880 ASSAY OF NATRIURETIC PEPTIDE: CPT | Performed by: INTERNAL MEDICINE

## 2018-10-04 PROCEDURE — 36415 COLL VENOUS BLD VENIPUNCTURE: CPT

## 2018-10-04 PROCEDURE — 93010 ELECTROCARDIOGRAM REPORT: CPT | Performed by: INTERNAL MEDICINE

## 2018-10-04 PROCEDURE — 93306 TTE W/DOPPLER COMPLETE: CPT

## 2018-10-04 PROCEDURE — 94760 N-INVAS EAR/PLS OXIMETRY 1: CPT

## 2018-10-04 PROCEDURE — 93306 TTE W/DOPPLER COMPLETE: CPT | Performed by: INTERNAL MEDICINE

## 2018-10-04 PROCEDURE — 84484 ASSAY OF TROPONIN QUANT: CPT | Performed by: INTERNAL MEDICINE

## 2018-10-04 PROCEDURE — 93005 ELECTROCARDIOGRAM TRACING: CPT | Performed by: INTERNAL MEDICINE

## 2018-10-04 PROCEDURE — 80053 COMPREHEN METABOLIC PANEL: CPT | Performed by: INTERNAL MEDICINE

## 2018-10-04 PROCEDURE — 85379 FIBRIN DEGRADATION QUANT: CPT | Performed by: INTERNAL MEDICINE

## 2018-10-04 PROCEDURE — 85025 COMPLETE CBC W/AUTO DIFF WBC: CPT | Performed by: INTERNAL MEDICINE

## 2018-10-04 PROCEDURE — 99214 OFFICE O/P EST MOD 30 MIN: CPT | Performed by: INTERNAL MEDICINE

## 2018-10-04 RX ORDER — SODIUM CHLORIDE 0.9 % (FLUSH) 0.9 %
10 SYRINGE (ML) INJECTION AS NEEDED
Status: DISCONTINUED | OUTPATIENT
Start: 2018-10-04 | End: 2018-10-26

## 2018-10-04 RX ORDER — MIDODRINE HYDROCHLORIDE 2.5 MG/1
2.5 TABLET ORAL 2 TIMES DAILY
Qty: 90 TABLET | Refills: 1 | Status: SHIPPED | OUTPATIENT
Start: 2018-10-04 | End: 2018-12-14 | Stop reason: SDUPTHER

## 2018-10-04 RX ORDER — NITROGLYCERIN 0.4 MG/1
0.4 TABLET SUBLINGUAL
Status: DISCONTINUED | OUTPATIENT
Start: 2018-10-04 | End: 2018-10-26

## 2018-10-04 NOTE — PROGRESS NOTES
Date of Office Visit: 10/04/2018  Encounter Provider: Marlin Ndiaye RN  Place of Service: [facility] CARDIOLOGY  Patient Name: Joya Singh  :1940      Patient ID:  Joya Singh is a 78 y.o. female is here for dizziness.            History of Present Illness    She has a reported history of CAD, but a cardiac cath was performed at [facility] in , and showed no significant intraluminal coronary atherosclerosis.  There was extraluminal coronary artery calcification.  She has diabetes, a history of stroke, COPD, CKD, AARON, orthostasis and hypertension. She states she has recurrent TIAs, but it would seem that she has Bell's palsy.     She had an echo and stress performed 10/2017 for chest pain.  The echo showed LVEF of 62% and was otherwise within normal limits for age.  The stress test showed a very small, extremely mild reversible defect in the distal anterior wall.       She has poor exercise tolerance due to her arthritis and obesity.     [place]  [facility] [place]  She says she's been lightheaded for 6 months but her the past couple weeks as gotten worse.  He has some mild dyspnea which is chronic but may be slightly worse.  She has occasional chest tightness which lasts for a couple minutes but this is unchanged.  She's not noticed her heart racing or skipping.  What she describes is lightheadedness that persistent unless she is lying down.  She has insulin-dependent diabetes and has significant neuropathy associated with it.  She's on multiple medications for arthritis and neuropathy including hydrocodone/acetaminophen when necessary, Flexeril when necessary, high-dose gabapentin and Cymbalta.  Unfortunately every single one the medications cause of lightheaded, dizziness and some orthostasis.  Her orthostatics were checked here today.  Her blood pressure was 133/70 and dropped to 116 when standing.  She was dizzy.  She says that she feels  lightheaded and near syncopal all the time.    About 3 days ago, she was stooping over to get something off the floor and had a syncopal episode.    According to her family, her blood sugar at home is very labile.  She also is not following a diabetic diet.  She does not see an endocrinologist.    Her troponin, proBNP, d-dimer, CMP were normal except for creatinine of 1.74(stable).  She is anemic with a hemoglobin of 8.5.  This is actually been fairly stable reviewing prior CBCs.    Past Medical History:   Diagnosis Date   • Allergic rhinitis    • Anxiety    • Arthritis    • Bell's palsy    • CKD (chronic kidney disease) stage 3, GFR 30-59 ml/min (CMS/[facility])    • Depression    • Diabetes mellitus (CMS/[facility])     LAST A1C 6   • Diabetic gastroparesis (CMS/[facility]) 2/19/2016   • GERD (gastroesophageal reflux disease)    • H/O blood clots     LEFT LEG 7 OR 8 YEARS AGO   • Hyperlipidemia    • Hypertension    • Hypothyroidism    • Normal coronary arteries     by cath 2013   • AARON (obstructive sleep apnea)     DOESNT WEAR REGULARLY   • PONV (postoperative nausea and vomiting)    • Skin cancer    • Stroke (CMS/[facility])    • TIA (transient ischemic attack)     LAST TIA JULY 2017         Past Surgical History:   Procedure Laterality Date   • BACK SURGERY      HARDWARE   • CHOLECYSTECTOMY      OPEN   • COLONOSCOPY  2011    due for repeat in 2021   • HYSTERECTOMY      PARTIAL    • NECK SURGERY     • SPINE SURGERY     • UPPER GASTROINTESTINAL ENDOSCOPY  2014    gastritis.  done by dr. hastings       Current Outpatient Prescriptions on File Prior to Encounter   Medication Sig Dispense Refill   • ACCU-CHEK FASTCLIX LANCETS misc TEST 3-4 TIMES DAILY AS DIRECTED 400 each 3   • ACCU-CHEK SMARTVIEW test strip TEST blood sugar three times daily OR AS DIRECTED 300 each 3   • acetaminophen (TYLENOL) 325 MG tablet Take 2 tablets by mouth Every 6 (Six) Hours As Needed for Mild Pain . OTC product 40 tablet 0   • Alcohol Swabs (B-D SINGLE USE SWABS  REGULAR) pads      • aspirin 325 MG tablet Take 325 mg by mouth Daily.     • atorvastatin (LIPITOR) 10 MG tablet Take one (1) tablet orally (by mouth) once daily 90 tablet 2   • benzonatate (TESSALON) 200 MG capsule Take 1 capsule by mouth 3 (Three) Times a Day As Needed for Cough. 20 capsule 0   • Blood Glucose Monitoring Suppl (ACCU-CHEK KENNETH SMARTVIEW) w/Device kit TEST blood sugar three times daily or as directed 1 kit 0   • budesonide (PULMICORT) 0.25 MG/2ML nebulizer solution      • Calcium Carb-Cholecalciferol (CALCIUM PLUS VITAMIN D3 PO) Take 1 tablet by mouth Every Morning.     • colchicine 0.6 MG tablet 2 pills po now then 1 pill po 1 hour later for gout 3 tablet 0   • Cyanocobalamin (VITAMELTS ENERGY VITAMIN B-12) 1500 MCG tablet dispersible Take 1 tablet by mouth Every Morning.     • cyclobenzaprine (FLEXERIL) 10 MG tablet Take one (1) tablet orally (by mouth) three times daily as needed formuscle spasms 30 tablet 0   • DULoxetine (CYMBALTA) 30 MG capsule      • Elastic Bandages & Supports (CARPAL TUNNEL WRIST STABILIZER) misc 1 each Daily. 1 each 0   • fluticasone (FLONASE) 50 MCG/ACT nasal spray 2 sprays into the nostril(s) as directed by provider Daily for 30 days. Administer 2 sprays in each nostril for each dose. 1 bottle 2   • gabapentin (NEURONTIN) 400 MG capsule Take one (1) capsule orally (by mouth) each morning then one (1) in the afternoon and two (2) each night at bedtime 120 capsule 5   • HYDROcodone-acetaminophen (NORCO) 5-325 MG per tablet Take 1 tablet by mouth Every 4 (Four) Hours As Needed for Severe Pain . 20 tablet 0   • insulin aspart (NOVOLOG FLEXPEN) 100 UNIT/ML solution pen-injector sc pen 20 units sq before breakfast then 25 units sq before lunch and dinner (Patient taking differently: 3 (Three) Times a Day With Meals. 20 units sq before breakfast then 25 units sq before lunch and dinner) 15 mL 6   • Insulin Degludec (TRESIBA FLEXTOUCH) 200 UNIT/ML solution pen-injector Inject 50  "Units under the skin Daily. 3 pen 11   • levothyroxine (SYNTHROID) 88 MCG tablet Take 1 tablet by mouth Daily. 30 tablet 3   • MethylPREDNISolone (MEDROL, ANIL,) 4 MG tablet Take as directed on package instructions. 21 tablet 0   • Needle, Disp, (BD DISP NEEDLES) 30G X 1/2\" misc To be used 3 times daily with Novolog Flexpen. 100 each 5   • nystatin (MYCOSTATIN) 612015 UNIT/GM powder Apply  topically 3 (Three) Times a Day. 56.7 g 1   • O2 (OXYGEN) Inhale 2 L/min Every Night.     • omeprazole (priLOSEC) 40 MG capsule Take one (1) capsule orally (by mouth) once daily 90 capsule 1   • ORTHOVISC 30 MG/2ML solution prefilled syringe injection      • thiamine (VITAMIN B-1) 100 MG tablet Take 100 mg by mouth Daily.     • ULTICARE MICRO PEN NEEDLES 32G X 4 MM misc      • ULTICARE MINI PEN NEEDLES 31G X 6 MM misc USE TO INJECT INSULINS 4 TIMES DAILY AS DIRECTED 100 each 10   • ULTICARE PEN NEEDLES 29G X 12MM misc      • [DISCONTINUED] apixaban (ELIQUIS) 2.5 MG tablet tablet Take 1 tablet by mouth 2 (Two) Times a Day. 60 tablet 5   • [DISCONTINUED] buPROPion SR (WELLBUTRIN SR) 150 MG 12 hr tablet Take 150 mg by mouth Every Night.     • [DISCONTINUED] furosemide (LASIX) 20 MG tablet Take one (1) tablet orally (by mouth) once daily 90 tablet 3   • [DISCONTINUED] melatonin 5 MG tablet tablet Take 5 mg by mouth At Night As Needed.       No current facility-administered medications on file prior to encounter.        Social History     Social History   • Marital status:      Spouse name: N/A   • Number of children: N/A   • Years of education: N/A     Occupational History   • Not on file.     Social History Main Topics   • Smoking status: Never Smoker   • Smokeless tobacco: Never Used      Comment: CAFFEINE USE: NONE   • Alcohol use No   • Drug use: No   • Sexual activity: Defer      Comment: EXERCISE - RARELY     Other Topics Concern   • Not on file     Social History Narrative   • No narrative on file           Review of " "Systems   Constitution: Positive for malaise/fatigue.   HENT: Negative for congestion.    Eyes: Negative for vision loss in left eye and vision loss in right eye.   Respiratory: Positive for shortness of breath. Negative for cough, hemoptysis, sleep disturbances due to breathing, snoring, sputum production and wheezing.    Endocrine: Negative.    Hematologic/Lymphatic: Negative.    Skin: Negative for poor wound healing and rash.   Musculoskeletal: Negative for falls, gout, muscle cramps and myalgias.   Gastrointestinal: Negative for abdominal pain, diarrhea, dysphagia, hematemesis, melena, nausea and vomiting.   Neurological: Positive for dizziness. Negative for excessive daytime sleepiness, headaches, light-headedness, loss of balance, seizures and vertigo.   Psychiatric/Behavioral: Negative for depression and substance abuse. The patient is not nervous/anxious.        Procedures    ECG 12 Lead  Date/Time: 10/4/2018 11:56 AM  Performed by: CARLITA CARLSON  Authorized by: CARLITA CARLSON   Comparison: compared with previous ECG   Similar to previous ECG  Rhythm: sinus rhythm  Conduction: right bundle branch block  Clinical impression: abnormal ECG                Objective:      Vitals:    10/04/18 1006 10/04/18 1009 10/04/18 1010   BP: 128/76 133/70 116/67   BP Location: Left arm Right arm Right arm   Patient Position: Sitting Sitting Standing   SpO2:  95%    Weight:  92.1 kg (203 lb)    Height:  160 cm (63\")      Body mass index is 35.96 kg/m².    Physical Exam   Constitutional: She is oriented to person, place, and time. She appears well-developed and well-nourished. No distress.   HENT:   Head: Normocephalic and atraumatic.   Eyes: Conjunctivae are normal. No scleral icterus.   Neck: Neck supple. No JVD present. Carotid bruit is not present. No thyromegaly present.   Cardiovascular: Normal rate, regular rhythm, S1 normal, S2 normal, normal heart sounds and intact distal pulses.   No extrasystoles are " present. PMI is not displaced.  Exam reveals no gallop.    No murmur heard.  Pulses:       Carotid pulses are 2+ on the right side, and 2+ on the left side.       Radial pulses are 2+ on the right side, and 2+ on the left side.        Dorsalis pedis pulses are 2+ on the right side, and 2+ on the left side.        Posterior tibial pulses are 2+ on the right side, and 2+ on the left side.   Pulmonary/Chest: Effort normal and breath sounds normal. No respiratory distress. She has no wheezes. She has no rhonchi. She has no rales. She exhibits no tenderness.   Abdominal: Soft. Bowel sounds are normal. She exhibits no distension, no abdominal bruit and no mass. There is no tenderness.   Musculoskeletal: She exhibits no edema or deformity.   Lymphadenopathy:     She has no cervical adenopathy.   Neurological: She is alert and oriented to person, place, and time. No cranial nerve deficit.   Skin: Skin is warm and dry. No rash noted. She is not diaphoretic. No cyanosis. No pallor. Nails show no clubbing.   Psychiatric: She has a normal mood and affect. Judgment normal.   Vitals reviewed.      Lab Review:       Assessment:      Diagnosis Plan   1. Shortness of breath  Cardiac Monitoring    Vital Signs - Once    Vital Signs - As Needed    Pulse Oximetry    Oxygen Therapy- Nasal Cannula; Titrate for SPO2: 92%, equal to or greater than    Insert Peripheral IV    sodium chloride 0.9 % flush 10 mL    nitroglycerin (NITROSTAT) SL tablet 0.4 mg    NPO Diet    Bathroom Privileges With Assistance    CBC & Differential    Comprehensive Metabolic Panel    Troponin T    D-dimer    proBNP    ECG 12 Lead    Cardiac Monitoring    Vital Signs - Once    Oxygen Therapy- Nasal Cannula; Titrate for SPO2: 92%, equal to or greater than    Insert Peripheral IV    NPO Diet    Bathroom Privileges With Assistance    Troponin T    Troponin T    D-dimer    D-dimer    proBNP    proBNP    CBC Auto Differential   2. Dizziness  Cardiac Monitoring    Vital  Signs - Once    Vital Signs - As Needed    Pulse Oximetry    Oxygen Therapy- Nasal Cannula; Titrate for SPO2: 92%, equal to or greater than    Insert Peripheral IV    sodium chloride 0.9 % flush 10 mL    nitroglycerin (NITROSTAT) SL tablet 0.4 mg    NPO Diet    Bathroom Privileges With Assistance    CBC & Differential    Comprehensive Metabolic Panel    Troponin T    D-dimer    proBNP    ECG 12 Lead    Cardiac Monitoring    Vital Signs - Once    Oxygen Therapy- Nasal Cannula; Titrate for SPO2: 92%, equal to or greater than    Insert Peripheral IV    NPO Diet    Bathroom Privileges With Assistance    Troponin T    Troponin T    D-dimer    D-dimer    proBNP    proBNP    CBC Auto Differential   3. Primary osteoarthritis of left knee  Urinalysis With Culture If Indicated - Urine, Clean Catch    Urinalysis With Culture If Indicated - Urine, Clean Catch   4. Insufficiency fracture of tibia with nonunion, subsequent encounter  Urinalysis With Culture If Indicated - Urine, Clean Catch    Urinalysis With Culture If Indicated - Urine, Clean Catch   5. Complex tear of medial meniscus of left knee as current injury, subsequent encounter  Urinalysis With Culture If Indicated - Urine, Clean Catch    Urinalysis With Culture If Indicated - Urine, Clean Catch     1. Near-syncope, lightheaded, orthostatic.  Try midodrine 2.5mg in am and mid-day.  Decrease cymbalta to 2 tabs daily. Avoid norco and flexeril.    2. DM, insulin dependent, poorly controlled, severe neuropathy. This is likely driving her autonomic dysfunction leading to #1.  Refer to endocrine.   3. Anemia, not helping.   4. CKD  5. AARON  6. Obesity.      Plan:       Echo today, f/u with Dr. Mercado.  See Dr. Sanon in 6 weeks.  No ACS.

## 2018-10-04 NOTE — ADDENDUM NOTE
Encounter addended by: Marlin Ndiaye RN on: 10/4/2018 12:43 PM<BR>    Actions taken: LDA properties accepted

## 2018-10-04 NOTE — PATIENT INSTRUCTIONS
Called Pharmacy to inform them of Midodrine 2.5mg qam and 6 hours following that.  Reduced Cymbalta  30mg 2tabs daily. S/W pharmacy.

## 2018-10-10 ENCOUNTER — OFFICE VISIT (OUTPATIENT)
Dept: INTERNAL MEDICINE | Facility: CLINIC | Age: 78
End: 2018-10-10

## 2018-10-10 VITALS
DIASTOLIC BLOOD PRESSURE: 72 MMHG | OXYGEN SATURATION: 95 % | SYSTOLIC BLOOD PRESSURE: 118 MMHG | HEART RATE: 94 BPM | HEIGHT: 63 IN

## 2018-10-10 DIAGNOSIS — E11.43 TYPE 2 DIABETES MELLITUS WITH DIABETIC AUTONOMIC NEUROPATHY, WITH LONG-TERM CURRENT USE OF INSULIN (HCC): ICD-10-CM

## 2018-10-10 DIAGNOSIS — Z79.4 TYPE 2 DIABETES MELLITUS WITH DIABETIC AUTONOMIC NEUROPATHY, WITH LONG-TERM CURRENT USE OF INSULIN (HCC): ICD-10-CM

## 2018-10-10 DIAGNOSIS — R35.0 FREQUENT URINATION: ICD-10-CM

## 2018-10-10 DIAGNOSIS — R82.998 LEUKOCYTES IN URINE: Primary | ICD-10-CM

## 2018-10-10 LAB
BILIRUB BLD-MCNC: NEGATIVE MG/DL
CLARITY, POC: ABNORMAL
COLOR UR: YELLOW
GLUCOSE UR STRIP-MCNC: NEGATIVE MG/DL
KETONES UR QL: ABNORMAL
LEUKOCYTE EST, POC: ABNORMAL
NITRITE UR-MCNC: POSITIVE MG/ML
PH UR: 5.5 [PH] (ref 5–8)
PROT UR STRIP-MCNC: ABNORMAL MG/DL
RBC # UR STRIP: NEGATIVE /UL
SP GR UR: 1.01 (ref 1–1.03)
UROBILINOGEN UR QL: NORMAL

## 2018-10-10 PROCEDURE — 99214 OFFICE O/P EST MOD 30 MIN: CPT | Performed by: INTERNAL MEDICINE

## 2018-10-10 PROCEDURE — 81003 URINALYSIS AUTO W/O SCOPE: CPT | Performed by: INTERNAL MEDICINE

## 2018-10-10 RX ORDER — CEPHALEXIN 500 MG/1
500 CAPSULE ORAL 3 TIMES DAILY
Qty: 21 CAPSULE | Refills: 0 | Status: SHIPPED | OUTPATIENT
Start: 2018-10-10 | End: 2018-10-17

## 2018-10-10 RX ORDER — APIXABAN 2.5 MG/1
2.5 TABLET, FILM COATED ORAL EVERY 12 HOURS SCHEDULED
Status: ON HOLD | COMMUNITY
Start: 2018-09-21 | End: 2018-10-30

## 2018-10-10 NOTE — PROGRESS NOTES
Joya Singh is a 78 y.o. female, who presents with a chief complaint of   Chief Complaint   Patient presents with   • Urinary Tract Infection     She thinks she has UTI, she thinks it started when she was in here last on 9/28/18. She says her urine has a terrible smell and she has frequent urination to the point where she cannot get to the bathroom in time.        HPI   Pt is here with her son.  She has been feeling bad for a while and now feels really bad.  Unsure if fever.  She has had frequency and urgency.  + odor to urine.  No n/v/d.  Her glucoses have been some up.  She is trying to check them regularly    She has an appt Monday with vestibular PT.  She can't go back to the [facility] until her dizziness is better.      The following portions of the patient's history were reviewed and updated as appropriate: allergies, current medications, past family history, past medical history, past social history, past surgical history and problem list.    Allergies: Lisinopril; Morphine and related; Baclofen; Codeine; Morphine; and Penicillins    Review of Systems   Constitutional: Negative.    HENT: Negative.    Eyes: Negative.    Respiratory: Negative.    Cardiovascular: Negative.    Gastrointestinal: Negative.    Endocrine: Negative.    Genitourinary: Positive for dysuria and frequency.   Musculoskeletal: Negative.    Skin: Negative.    Allergic/Immunologic: Negative.    Neurological: Negative.    Hematological: Negative.    Psychiatric/Behavioral: Negative.    All other systems reviewed and are negative.            Wt Readings from Last 3 Encounters:   10/04/18 92.1 kg (203 lb)   07/17/18 93 kg (205 lb)   06/25/18 94.8 kg (209 lb)     Temp Readings from Last 3 Encounters:   09/11/18 98.1 °F (36.7 °C)   04/13/18 98.1 °F (36.7 °C) (Oral)   03/16/18 98.2 °F (36.8 °C)     BP Readings from Last 3 Encounters:   10/10/18 118/72   10/04/18 116/67   09/28/18 124/80     Pulse Readings from Last 3 Encounters:   10/10/18  94   10/04/18 90   09/28/18 80     There is no height or weight on file to calculate BMI.  @LASTSAO2(3)@    Physical Exam   Constitutional: She is oriented to person, place, and time. She appears well-developed and well-nourished. No distress.   HENT:   Head: Normocephalic and atraumatic.   Right Ear: External ear normal.   Left Ear: External ear normal.   Nose: Nose normal.   Mouth/Throat: Oropharynx is clear and moist.   Eyes: Pupils are equal, round, and reactive to light. Conjunctivae and EOM are normal.   Neck: Normal range of motion. Neck supple.   Cardiovascular: Normal rate, regular rhythm, normal heart sounds and intact distal pulses.    Pulmonary/Chest: Effort normal and breath sounds normal. No respiratory distress. She has no wheezes.   Musculoskeletal:   In wheelchair   Neurological: She is alert and oriented to person, place, and time.   Skin: Skin is warm and dry.   Psychiatric: She has a normal mood and affect. Her behavior is normal. Judgment and thought content normal.   Nursing note and vitals reviewed.      Results for orders placed or performed in visit on 10/10/18   POCT urinalysis dipstick, automated   Result Value Ref Range    Color Yellow Yellow, Straw, Dark Yellow, Mehnaz    Clarity, UA Cloudy (A) Clear    Specific Gravity  1.015 1.005 - 1.030    pH, Urine 5.5 5.0 - 8.0    Leukocytes Small (1+) (A) Negative    Nitrite, UA Positive (A) Negative    Protein, POC 30 mg/dL (A) Negative mg/dL    Glucose, UA Negative Negative, 1000 mg/dL (3+) mg/dL    Ketones, UA Trace (A) Negative    Urobilinogen, UA Normal Normal    Bilirubin Negative Negative    Blood, UA Negative Negative           Joya was seen today for urinary tract infection.    Diagnoses and all orders for this visit:    Leukocytes in urine  -     Urine Culture - Urine, Urine, Clean Catch  -     cephalexin (KEFLEX) 500 MG capsule; Take 1 capsule by mouth 3 (Three) Times a Day for 7 days.    Frequent urination  -     POCT urinalysis  dipstick, automated  -     Urine Culture - Urine, Urine, Clean Catch  -     cephalexin (KEFLEX) 500 MG capsule; Take 1 capsule by mouth 3 (Three) Times a Day for 7 days.    Type 2 diabetes mellitus with diabetic autonomic neuropathy, with long-term current use of insulin (CMS/[facility])    encouraged pt to watch glucoses closely while sick    Dizziness - pt eval Monday.      Outpatient Medications Prior to Visit   Medication Sig Dispense Refill   • ACCU-CHEK FASTCLIX LANCETS misc TEST 3-4 TIMES DAILY AS DIRECTED 400 each 3   • ACCU-CHEK SMARTVIEW test strip TEST blood sugar three times daily OR AS DIRECTED 300 each 3   • acetaminophen (TYLENOL) 325 MG tablet Take 2 tablets by mouth Every 6 (Six) Hours As Needed for Mild Pain . OTC product 40 tablet 0   • Alcohol Swabs (B-D SINGLE USE SWABS REGULAR) pads      • aspirin 325 MG tablet Take 325 mg by mouth Daily.     • atorvastatin (LIPITOR) 10 MG tablet Take one (1) tablet orally (by mouth) once daily 90 tablet 2   • Blood Glucose Monitoring Suppl (ACCU-CHEK KENNETH SMARTVIEW) w/Device kit TEST blood sugar three times daily or as directed 1 kit 0   • budesonide (PULMICORT) 0.25 MG/2ML nebulizer solution      • Calcium Carb-Cholecalciferol (CALCIUM PLUS VITAMIN D3 PO) Take 1 tablet by mouth Every Morning.     • colchicine 0.6 MG tablet 2 pills po now then 1 pill po 1 hour later for gout 3 tablet 0   • Cyanocobalamin (VITAMELTS ENERGY VITAMIN B-12) 1500 MCG tablet dispersible Take 1 tablet by mouth Every Morning.     • cyclobenzaprine (FLEXERIL) 10 MG tablet Take one (1) tablet orally (by mouth) three times daily as needed formuscle spasms 30 tablet 0   • DULoxetine (CYMBALTA) 30 MG capsule Take 60 mg by mouth Daily.     • Elastic Bandages & Supports (CARPAL TUNNEL WRIST STABILIZER) misc 1 each Daily. 1 each 0   • fluticasone (FLONASE) 50 MCG/ACT nasal spray 2 sprays into the nostril(s) as directed by provider Daily for 30 days. Administer 2 sprays in each nostril for each dose.  "1 bottle 2   • gabapentin (NEURONTIN) 400 MG capsule Take one (1) capsule orally (by mouth) each morning then one (1) in the afternoon and two (2) each night at bedtime 120 capsule 5   • HYDROcodone-acetaminophen (NORCO) 5-325 MG per tablet Take 1 tablet by mouth Every 4 (Four) Hours As Needed for Severe Pain . 20 tablet 0   • insulin aspart (NOVOLOG FLEXPEN) 100 UNIT/ML solution pen-injector sc pen 20 units sq before breakfast then 25 units sq before lunch and dinner (Patient taking differently: 3 (Three) Times a Day With Meals. 20 units sq before breakfast then 25 units sq before lunch and dinner) 15 mL 6   • Insulin Degludec (TRESIBA FLEXTOUCH) 200 UNIT/ML solution pen-injector Inject 50 Units under the skin Daily. 3 pen 11   • levothyroxine (SYNTHROID) 88 MCG tablet Take 1 tablet by mouth Daily. 30 tablet 3   • midodrine (PROAMATINE) 2.5 MG tablet Take 1 tablet by mouth 2 (Two) Times a Day. 90 tablet 1   • Needle, Disp, (BD DISP NEEDLES) 30G X 1/2\" misc To be used 3 times daily with Novolog Flexpen. 100 each 5   • nystatin (MYCOSTATIN) 012680 UNIT/GM powder Apply  topically 3 (Three) Times a Day. 56.7 g 1   • O2 (OXYGEN) Inhale 2 L/min Every Night.     • omeprazole (priLOSEC) 40 MG capsule Take one (1) capsule orally (by mouth) once daily 90 capsule 1   • ORTHOVISC 30 MG/2ML solution prefilled syringe injection      • thiamine (VITAMIN B-1) 100 MG tablet Take 100 mg by mouth Daily.     • ULTICARE MICRO PEN NEEDLES 32G X 4 MM misc      • ULTICARE MINI PEN NEEDLES 31G X 6 MM misc USE TO INJECT INSULINS 4 TIMES DAILY AS DIRECTED 100 each 10   • ULTICARE PEN NEEDLES 29G X 12MM misc      • benzonatate (TESSALON) 200 MG capsule Take 1 capsule by mouth 3 (Three) Times a Day As Needed for Cough. 20 capsule 0   • MethylPREDNISolone (MEDROL, ANIL,) 4 MG tablet Take as directed on package instructions. 21 tablet 0     Facility-Administered Medications Prior to Visit   Medication Dose Route Frequency Provider Last Rate Last " Dose   • nitroglycerin (NITROSTAT) SL tablet 0.4 mg  0.4 mg Sublingual Q5 Min PRN Neetu Chapin MD       • sodium chloride 0.9 % flush 10 mL  10 mL Intravenous PRN Neetu Chapin MD         New Medications Ordered This Visit   Medications   • cephalexin (KEFLEX) 500 MG capsule     Sig: Take 1 capsule by mouth 3 (Three) Times a Day for 7 days.     Dispense:  21 capsule     Refill:  0     [unfilled]  Medications Discontinued During This Encounter   Medication Reason   • benzonatate (TESSALON) 200 MG capsule *Therapy completed   • MethylPREDNISolone (MEDROL, ANIL,) 4 MG tablet *Therapy completed         Return for Next scheduled follow up.

## 2018-10-12 RX ORDER — POTASSIUM CHLORIDE 750 MG/1
TABLET, FILM COATED, EXTENDED RELEASE ORAL
Qty: 90 TABLET | Refills: 1 | Status: SHIPPED | OUTPATIENT
Start: 2018-10-12 | End: 2018-10-16

## 2018-10-14 LAB
BACTERIA UR CULT: ABNORMAL
BACTERIA UR CULT: ABNORMAL
OTHER ANTIBIOTIC SUSC ISLT: ABNORMAL

## 2018-10-15 ENCOUNTER — HOSPITAL ENCOUNTER (OUTPATIENT)
Dept: PHYSICAL THERAPY | Facility: HOSPITAL | Age: 78
Setting detail: THERAPIES SERIES
Discharge: HOME | End: 2018-10-15

## 2018-10-15 DIAGNOSIS — R42 DIZZINESS: Primary | ICD-10-CM

## 2018-10-15 DIAGNOSIS — R26.9 GAIT ABNORMALITY: ICD-10-CM

## 2018-10-15 DIAGNOSIS — R29.6 FALLS FREQUENTLY: ICD-10-CM

## 2018-10-15 DIAGNOSIS — H83.2X9 VESTIBULAR DYSFUNCTION, UNSPECIFIED LATERALITY: ICD-10-CM

## 2018-10-15 DIAGNOSIS — E11.43 TYPE 2 DIABETES MELLITUS WITH DIABETIC AUTONOMIC NEUROPATHY, WITH LONG-TERM CURRENT USE OF INSULIN (HCC): ICD-10-CM

## 2018-10-15 DIAGNOSIS — H81.12 BPPV (BENIGN PAROXYSMAL POSITIONAL VERTIGO), LEFT: ICD-10-CM

## 2018-10-15 DIAGNOSIS — Z79.4 TYPE 2 DIABETES MELLITUS WITH DIABETIC AUTONOMIC NEUROPATHY, WITH LONG-TERM CURRENT USE OF INSULIN (HCC): ICD-10-CM

## 2018-10-15 DIAGNOSIS — E10.9 BRITTLE DIABETES MELLITUS (HCC): ICD-10-CM

## 2018-10-15 PROCEDURE — 97162 PT EVAL MOD COMPLEX 30 MIN: CPT | Performed by: PHYSICAL THERAPIST

## 2018-10-15 PROCEDURE — G8979 MOBILITY GOAL STATUS: HCPCS | Performed by: PHYSICAL THERAPIST

## 2018-10-15 PROCEDURE — G8978 MOBILITY CURRENT STATUS: HCPCS | Performed by: PHYSICAL THERAPIST

## 2018-10-15 PROCEDURE — 97530 THERAPEUTIC ACTIVITIES: CPT | Performed by: PHYSICAL THERAPIST

## 2018-10-15 PROCEDURE — 95992 CANALITH REPOSITIONING PROC: CPT | Performed by: PHYSICAL THERAPIST

## 2018-10-15 RX ORDER — INSULIN ASPART 100 [IU]/ML
INJECTION, SOLUTION INTRAVENOUS; SUBCUTANEOUS
Qty: 15 ML | Refills: 0 | Status: ON HOLD | OUTPATIENT
Start: 2018-10-15 | End: 2018-10-30

## 2018-10-16 ENCOUNTER — HOSPITAL ENCOUNTER (EMERGENCY)
Facility: HOSPITAL | Age: 78
Discharge: HOME | End: 2018-10-16
Attending: EMERGENCY MEDICINE | Admitting: EMERGENCY MEDICINE

## 2018-10-16 ENCOUNTER — APPOINTMENT (OUTPATIENT)
Dept: CT IMAGING | Facility: HOSPITAL | Age: 78
End: 2018-10-16

## 2018-10-16 ENCOUNTER — TELEPHONE (OUTPATIENT)
Dept: INTERNAL MEDICINE | Facility: CLINIC | Age: 78
End: 2018-10-16

## 2018-10-16 VITALS
HEIGHT: 62 IN | OXYGEN SATURATION: 94 % | TEMPERATURE: 98.4 F | BODY MASS INDEX: 38.18 KG/M2 | RESPIRATION RATE: 18 BRPM | HEART RATE: 92 BPM | WEIGHT: 207.5 LBS

## 2018-10-16 DIAGNOSIS — R42 VERTIGO: ICD-10-CM

## 2018-10-16 DIAGNOSIS — S09.90XA INJURY OF HEAD, INITIAL ENCOUNTER: Primary | ICD-10-CM

## 2018-10-16 PROCEDURE — 72125 CT NECK SPINE W/O DYE: CPT

## 2018-10-16 PROCEDURE — 99282 EMERGENCY DEPT VISIT SF MDM: CPT | Performed by: EMERGENCY MEDICINE

## 2018-10-16 PROCEDURE — 70450 CT HEAD/BRAIN W/O DYE: CPT

## 2018-10-16 PROCEDURE — 99283 EMERGENCY DEPT VISIT LOW MDM: CPT

## 2018-10-16 RX ORDER — ACETAMINOPHEN 500 MG
500 TABLET ORAL ONCE
Status: COMPLETED | OUTPATIENT
Start: 2018-10-16 | End: 2018-10-16

## 2018-10-16 RX ADMIN — ACETAMINOPHEN 500 MG: 500 TABLET, FILM COATED ORAL at 02:21

## 2018-10-16 NOTE — THERAPY EVALUATION
Outpatient Physical Therapy Vestibular Initial Evaluation  [facility]     Patient Name: Joya Singh  : 1940  MRN: [id number]  Today's Date: 10/16/2018      Visit Date: 10/15/2018    Patient Active Problem List   Diagnosis   • Deep vein thrombosis of lower extremity (CMS/HCC)   • Arthritis   • Chronic back pain   • Chronic anemia   • Type 2 diabetes mellitus with neurologic complication (CMS/HCC)   • Brittle diabetes mellitus (CMS/HCC)   • Dysuria   • Hyperlipidemia   • Hypertension   • Insomnia with sleep apnea   • Spasm   • Obstructive sleep apnea syndrome   • Peripheral neuropathy   • Speech disturbance   • Warfarin anticoagulation   • Diabetic gastroparesis (CMS/HCC)   • Primary localized osteoarthrosis of left shoulder region   • Rotator cuff tendonitis   • Acquired hypothyroidism   • Anxiety   • Gastroesophageal reflux disease   • History of biliary T-tube placement   • 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)   • Complex tear of medial meniscus of left knee as current injury   • Insufficiency fracture of tibia   • Primary osteoarthritis of left knee   • Hypoxia   • Low blood pressure   • Hyperkalemia   • Tendinitis of right rotator cuff   • AC joint arthropathy   • Subacromial impingement of right shoulder   • Right carpal tunnel syndrome        Past Medical History:   Diagnosis Date   • Allergic rhinitis    • Anxiety    • Arthritis    • Bell's palsy    • CKD (chronic kidney disease) stage 3, GFR 30-59 ml/min (CMS/HCC)    • Depression    • Diabetes mellitus (CMS/HCC)     LAST A1C 6   • Diabetic gastroparesis (CMS/HCC) 2016   • GERD (gastroesophageal reflux disease)    • H/O blood clots     LEFT LEG 7 OR 8 YEARS AGO   • Hyperlipidemia    • Hypertension    • Hypothyroidism    • Normal coronary arteries     by cath    • AARON (obstructive sleep apnea)     DOESNT WEAR REGULARLY   • PONV (postoperative nausea and vomiting)    • Skin cancer    • Stroke (CMS/HCC)    • TIA (transient  "ischemic attack)     LAST TIA JULY 2017        Past Surgical History:   Procedure Laterality Date   • BACK SURGERY      HARDWARE   • CHOLECYSTECTOMY      OPEN   • COLONOSCOPY  2011    due for repeat in 2021   • HYSTERECTOMY      PARTIAL    • NECK SURGERY     • SPINE SURGERY     • UPPER GASTROINTESTINAL ENDOSCOPY  2014    gastritis.  done by dr. hastings         Visit Dx:     ICD-10-CM ICD-9-CM   1. Dizziness R42 780.4   2. BPPV (benign paroxysmal positional vertigo), left H81.12 386.11   3. Vestibular dysfunction, unspecified laterality H83.2X9 386.50   4. Gait abnormality R26.9 781.2   5. Falls frequently R29.6 V15.88             Patient History     Row Name 10/15/18 1600             History    Chief Complaint Dizziness  -GR      Date Current Problem(s) Began 10/15/17  -GR      Brief Description of Current Complaint 77 y/o with c/o insidious onset dizziness dating back to at least a year, unable to specify. Reports things worsened in March 2018 after she was hospitalized secondary to an allergic medication to her heart medication. States she has been \"dizzy\" and worse ever since.  MRI of the brain completed in May with degenerative changes; patient reports history of \"tumors,\" this is not verified in the radiology report.  H/o CVA x 4 years ago and h/o of Bell's Palsy since age 32.  She is brought today in a wheelchair secondary to the intensity of her dizziness and inability to safely ambulate.  She reports at least 10 falls in the last year.  When she is ambulatory she will use a SC or RW.  Community activities include going to the senior center, which she had to give up recently secondary to her balance.  She staes she has had back surgery and also neds a total knee replacement previously affecting her balance.  She has had previous PT for balance and she states this helped; this includes a HH therapist that assisted in vertigo correction approximately 1 year ago.  She denies spinning, states she just feels " "\"blackness.\"  She denies tinnitus, light or sound sensitivity or regular caffeine intake; she does have hearing loss on the L but has declined hearing aids.  She is diabetic.  Per patient her MDs are working on correcting her medication regimen and at this time BP is controlled.  Patient states she cannot tolerate meclazine.  Her next follow up is 10/30.     -GR      Previous treatment for THIS PROBLEM Rehabilitation  -GR      Patient/Caregiver Goals Return to prior level of function;Relief from dizziness  -GR      Are you or can you be pregnant No  -GR         Pain     Pain Location Head  -GR      Pain at Present 0  -GR      Pain at Best 0  -GR      Pain at Worst 9  -GR         Fall Risk Assessment    Any falls in the past year: Yes  -GR      Number of falls reported in the last 12 months 10  -GR      Factors that contributed to the fall: Lost balance  -GR      Does patient have a fear of falling Yes (comment)   w/c for out of house 2/2 fear  -GR         Services    Prior Rehab/Home Health Experiences Yes  -GR      When was the prior experience with Rehab/Home Health 1 year ago  -GR      Where was the prior experience with Rehab/Home Health HH  -GR      Are you currently receiving Home Health services No  -GR         Daily Activities    Primary Language English  -GR      Pt Participated in POC and Goals Yes  -GR         Safety    Are you being hurt, hit, or frightened by anyone at home or in your life? No  -GR      Are you being neglected by a caregiver No  -GR        User Key  (r) = Recorded By, (t) = Taken By, (c) = Cosigned By    Initials Name Provider Type    GR Gigi Olmstead, PT Physical Therapist                Vestibular So     Row Name 10/16/18 0700             Vestibular Objective    Fall History 10+  -GR      Use of Assistive Devices SC, RW, WC  -GR         Cognition    Orientation Level Oriented to place;Oriented to person  -GR         Symptom Behavior    Type of Dizziness Imbalance;Oscillopsia  " -GR      Frequency of Dizziness Several Times a Day  -GR      Duration of Dizziness Days  -GR      VAS Intensity (0-10) 9  -GR      Aggravating Factors No known aggravating factors  -GR      Relieving Factors Closing eyes;Rest;Slow movements  -GR         Occulomotor Exam Fixation Present    Occular ROM Abnormal  -GR      Spontaneous Nystagmus Absent  -GR      Gaze-induced Nystagmus Absent  -GR      Smooth Pursuit Symptom Provoking  -GR      Saccades Undershoots  -GR      Convergence Abnormal  -GR         Vestibulo-Occular Reflex (VOR)    VOR to Fast Head Movement/Head Thrust Test Positive bilaterally  -GR      VOR Cancellation Corrective saccades  -GR         Positional Testing    Positional Testing Without infrared goggles  -GR      Vertebrobasilar Artery Screen - Right Negative  -GR      Vertebrobasilar Artery Screen - Left Negative  -GR      Erick-Hallpike Right No nystagmus   symptomatic  -GR      Erick-Hallpike Left Upbeat, left rotatory nystagmus  -GR      Erick-Hallpike Left Onset Time  5  -GR      Shirland-Hallpike Left Duration Time  20  -GR         Static    Static Standing Static Balance Comments  -GR      Standing Static Balance Comments modified CTSIB 5/120   attempted eyes closed immediate near LOB  -GR        User Key  (r) = Recorded By, (t) = Taken By, (c) = Cosigned By    Initials Name Provider Type    GR Gigi Olmstead, PT Physical Therapist                      Therapy Education  Education Details: extensive discussion and review of vestibular anatomy, differential diagnosis, static habituation for safety, pending response to today's intervention likely consult for home health per inability to leave home safely and continued falls, indicated need for gaze stabilization exercises once appropriate and realistic expectation regarding recovery (20 minutes of theract)  Given: HEP, Symptoms/condition management  Program: New  How Provided: Verbal, Demonstration, Written  Provided to: Patient, Caregiver  (reviewed with the patient's son)  Level of Understanding: Teach back education performed, Verbalized            Exercises     Row Name 10/16/18 0700             Total Minutes    33877 - PT Therapeutic Activity Minutes 20   see education  -GR         Exercise 1    Exercise Name 1 Canalith repositioning manuever- Epley L  -GR      Sets 1 1  -GR      Reps 1 2  -GR      Additional Comments retest positive  -GR        User Key  (r) = Recorded By, (t) = Taken By, (c) = Cosigned By    Initials Name Provider Type    Gigi Simpson, PT Physical Therapist                            PT OP Goals     Row Name 10/16/18 0700          PT Short Term Goals    STG Date to Achieve 10/31/18  -GR     STG 1 Patient will present negative for BPPV.  -GR     STG 2 Remaining goals TBD pending return to outpatient PT for HH.  -GR        Time Calculation    PT Goal Re-Cert Due Date 01/14/19  -GR       User Key  (r) = Recorded By, (t) = Taken By, (c) = Cosigned By    Initials Name Provider Type    Gigi Simpson, PT Physical Therapist                PT Assessment/Plan     Row Name 10/16/18 0718          PT Assessment    Functional Limitations Decreased safety during functional activities;Impaired gait;Limitation in home management;Limitations in community activities;Limitations in functional capacity and performance;Performance in leisure activities;Performance in self-care ADL  -GR     Impairments Balance;Gait;Peripheral nerve integrity;Poor body mechanics;Posture;Pain  -GR     Assessment Comments 77 y/o F referred to outpatient PT for vestibular evaluation.  She is pleasant with a complex PMH and difficulty verifying timeline of symptom onset;  at best her dizziness is within 1 year or onset, insidious.  Pertinent comorbidities include h/o of CVA, Bell's palsy, impaired balance due to lumbar dysfunction and h/o TKA, +10 falls of often unknown origin.  Today she demonstrates several central signs with occuluomotor exam including  abnormal occular ROM, symptomatic smooth pursuit, corrective saccades with VOR cancellation and immediate LOB with eyes closed static balance requriing PT assist. She also presented with positive L berry hallpike indicative of posterior canalithiasis of the L ear; this may be secondary to recent fall with head trauma.  A canalith repositioning maneuver was completed today x 2.  Patient poses a safety risk in attending outpatient physical therapy and may be better suited for home health services as she is challenged in ambulating unattended or leaving the home.  Will follow up to see if any symptoms have resolved within 48 hours and if no better recommend vestibular home health therapy follow. Thank you for this referral.  -GR     Please refer to paper survey for additional self-reported information Yes  -GR     Rehab Potential Fair  -GR     Patient/caregiver participated in establishment of treatment plan and goals Yes  -GR     Patient would benefit from skilled therapy intervention Yes  -GR        PT Plan    PT Frequency 2x/week  -GR     Predicted Duration of Therapy Intervention (Therapy Eval) PRN; likely referral to  pending response.  -GR     Planned CPT's? PT EVAL MOD COMPLELITY: 73867;PT RE-EVAL: 99264;PT THER PROC EA 15 MIN: 13309;PT THER ACT EA 15 MIN: 94248;PT NEUROMUSC RE-EDUCATION EA 15 MIN: 46610;PT GAIT TRAINING EA 15 MIN: 46152  -GR     Physical Therapy Interventions (Optional Details) balance training;home exercise program;vestibular training  -GR     PT Plan Comments If no better with canalith repositioning and unable to safely exit home, recommend home health PT follow. If better, return to outpatient PT for gaze stabilization training.  -GR       User Key  (r) = Recorded By, (t) = Taken By, (c) = Cosigned By    Initials Name Provider Type    GR Gigi Olmstead, PT Physical Therapist           Outcome Measure Options: Dizziness Handicap Inventory (62/100)         Time Calculation:   Start Time:  1600  Stop Time: 1655  Time Calculation (min): 55 min  Total Timed Code Minutes- PT: 20 minute(s)   Therapy Suggested Charges     Code   Minutes Charges    47901 (CPT®) Hc Pt Neuromusc Re Education Ea 15 Min      93330 (CPT®) Hc Pt Ther Proc Ea 15 Min      89944 (CPT®) Hc Gait Training Ea 15 Min      18020 (CPT®) Hc Pt Therapeutic Act Ea 15 Min 20 1    26335 (CPT®) Hc Pt Manual Therapy Ea 15 Min      37905 (CPT®) Hc Pt Ther Massage- Per 15 Min      88831 (CPT®) Hc Pt Iontophoresis Ea 15 Min      60235 (CPT®) Hc Pt Elec Stim Ea-Per 15 Min      39242 (CPT®) Hc Pt Ultrasound Ea 15 Min      64537 (CPT®) Hc Pt Self Care/Mgmt/Train Ea 15 Min      44218 (CPT®) Hc Pt Prosthetic (S) Train Initial Encounter, Each 15 Min      19354 (CPT®) Hc Orthotic(S) Mgmt/Train Initial Encounter, Each 15min      19835 (CPT®) Hc Pt Aquatic Therapy Ea 15 Min      33223 (CPT®) Hc Pt Orthotic(S)/Prosthetic(S) Encounter, Each 15 Min       (CPT®) Hc Pt Electrical Stim Unattended      Total  20 1        Therapy Charges for Today     Code Description Service Date Service Provider Modifiers Qty    30462813165 HC PT MOBILITY CURRENT 10/15/2018 Gigi Olmstead, PT GP, CM 1    19230344016 HC PT MOBILITY PROJECTED 10/15/2018 Gigi Olmstead, PT GP, CK 1    54159989670 HC PT THERAPEUTIC ACT EA 15 MIN 10/15/2018 Gigi Olmstead, PT GP 1    74199345426 HC PT CANALITH REPOSITIONING PER DAY 10/15/2018 Gigi Olmstead, PT GP 1    12894510001 HC PT EVAL MOD COMPLEXITY 2 10/15/2018 Gigi Olmstead, PT GP 1          PT G-Codes  PT Professional Judgement Used?: Yes  Outcome Measure Options: Dizziness Handicap Inventory (62/100)  Functional Limitation: Mobility: Walking and moving around  Mobility: Walking and Moving Around Current Status (): At least 80 percent but less than 100 percent impaired, limited or restricted  Mobility: Walking and Moving Around Goal Status (): At least 40 percent but less than 60 percent impaired, limited or  restricted         Gigi Olmstead, PT  10/16/2018

## 2018-10-16 NOTE — TELEPHONE ENCOUNTER
Pt  Aware        ----- Message from Chari Mercado MD sent at 10/16/2018  5:14 PM EDT -----  Ok to take her hydrocodone if pain that bad but she needs to watch for worsening dizziness and avoid getting up and walking around if she has taken the med.    ----- Message -----  From: Gaviota Bowles MA  Sent: 10/16/2018  12:16 PM  To: Chari Mercado MD    Pt  Wanting to know  If it  Ok to take the  Hydrocodone,  She is having  Dizziness,   As you  Are aware,  She went to er.  They gave her  Tylenol  But that  Done  Nothing.     [phone]    Also  Wants  To see if  She can  Get samples of   tresiba   200/units    Also wants to do patient assistance  For her  tresiba

## 2018-10-16 NOTE — TELEPHONE ENCOUNTER
----- Message from Chari Mercado MD sent at 10/15/2018  5:05 PM EDT -----  Call pt about labs.  Pt on keflex which is appropriate.  Is pt improved?    Pt given results.dg

## 2018-10-16 NOTE — DISCHARGE INSTRUCTIONS
Rest as needed.  Please return to the ER for any worsening pain, weakness, dizziness, numbness, vision changes or any other concerns.

## 2018-10-16 NOTE — ED PROVIDER NOTES
Subjective   History of Present Illness  History of Present Illness    Chief complaint: Head injury, headache    Location: Back of head on the left side and    Quality/Severity:  Moderate pain    Timing/Duration: Injury occurred around 11 AM yesterday    Modifying Factors: None    Narrative: This patient presents for evaluation of a head injury that occurred at 11:00 AM yesterday.  Apparently the patient has been suffering with vertigo symptoms for several days now.  Yesterday at around 11 AM she was having some dizziness at home which caused her to stumble and fall to the ground.  She says that she felt she did strike her face against the floor.  She did not have a loss of consciousness at that time.  When she was raising back up, however, she had the back of her head on the edge of a side table.  This caused some immediate pain in the back of her head on the left side.  However, she was able to continue with her day.  Infectious did go see her physical therapist afterwards and he was performing some maneuvers on her neck including the Epley maneuver to try and help treat her vertigo symptoms.  Now, tonight, as she was trying to rest in bed it seemed that the headache symptoms getting worse in the area where she struck her head on the table.  She denies any neck pain.  She denies any weakness or numbness or vision changes.  She has not had any medications for the pain prior to arrival    Associated Symptoms: As above    Review of Systems   Constitutional: Negative for activity change and fever.   Eyes: Negative for pain and visual disturbance.   Respiratory: Negative for shortness of breath.    Cardiovascular: Negative for chest pain.   Gastrointestinal: Negative for abdominal pain, nausea and vomiting.   Skin: Negative for color change and rash.   Neurological: Positive for dizziness and headaches. Negative for tremors, seizures, syncope, speech difficulty, weakness and numbness.   Psychiatric/Behavioral: Negative  for confusion.   All other systems reviewed and are negative.      Past Medical History:   Diagnosis Date   • Allergic rhinitis    • Anxiety    • Arthritis    • Bell's palsy    • CKD (chronic kidney disease) stage 3, GFR 30-59 ml/min (CMS/[facility])    • Depression    • Diabetes mellitus (CMS/[facility])     LAST A1C 6   • Diabetic gastroparesis (CMS/[facility]) 2/19/2016   • GERD (gastroesophageal reflux disease)    • H/O blood clots     LEFT LEG 7 OR 8 YEARS AGO   • Hyperlipidemia    • Hypertension    • Hypothyroidism    • Normal coronary arteries     by cath 2013   • AARON (obstructive sleep apnea)     DOESNT WEAR REGULARLY   • PONV (postoperative nausea and vomiting)    • Skin cancer    • Stroke (CMS/[facility])    • TIA (transient ischemic attack)     LAST TIA JULY 2017       Allergies   Allergen Reactions   • Lisinopril Cough   • Morphine And Related    • Baclofen Anxiety     Panic attack, nightmares   • Codeine Itching and Rash   • Morphine Rash   • Penicillins Rash       Past Surgical History:   Procedure Laterality Date   • BACK SURGERY      HARDWARE   • CHOLECYSTECTOMY      OPEN   • COLONOSCOPY  2011    due for repeat in 2021   • HYSTERECTOMY      PARTIAL    • NECK SURGERY     • SPINE SURGERY     • UPPER GASTROINTESTINAL ENDOSCOPY  2014    gastritis.  done by dr. hastings       Family History   Problem Relation Age of Onset   • Lupus Mother    • Heart failure Mother 59   • Heart disease Other    • Hypertension Other    • Heart attack Father        Social History     Social History   • Marital status:      Social History Main Topics   • Smoking status: Never Smoker   • Smokeless tobacco: Never Used      Comment: CAFFEINE USE: NONE   • Alcohol use No   • Drug use: No   • Sexual activity: Defer      Comment: EXERCISE - RARELY     Other Topics Concern   • Not on file       ED Triage Vitals [10/16/18 0157]   Temp Heart Rate Resp BP SpO2   98.4 °F (36.9 °C) 92 18 -- 94 %      Temp src Heart Rate Source Patient Position BP Location FiO2  "(%)   Oral Monitor -- -- --         Objective   Physical Exam   Constitutional: She is oriented to person, place, and time. She appears well-developed and well-nourished. No distress.   HENT:   Head: Normocephalic and atraumatic.   No scalp contusion or laceration injury evident anywhere.  There is some mild to moderate soft tissue tenderness to palpation in the left occipital region of the head.   Eyes: Pupils are equal, round, and reactive to light. EOM are normal. Right eye exhibits no discharge. Left eye exhibits no discharge.   Neck: Normal range of motion. Neck supple. No tracheal deviation present.   Nontender posterior cervical spine examination.  No step-offs or deformities.   Cardiovascular: Normal rate, regular rhythm and intact distal pulses.    Pulmonary/Chest: Effort normal. No respiratory distress.   Musculoskeletal: Normal range of motion. She exhibits no edema or deformity.   Neurological: She is alert and oriented to person, place, and time. She exhibits normal muscle tone.   Skin: Skin is warm and dry. No rash noted. She is not diaphoretic. No erythema. No pallor.   Psychiatric: She has a normal mood and affect. Her behavior is normal. Judgment and thought content normal.   Nursing note and vitals reviewed.    RADIOLOGY        Study: CT head without contrast    Findings: No acute intracranial injury    Interpreted contemporaneously with treatment by Dr. Moss, independently viewed by me      RADIOLOGY        Study: CT cervical spine without contrast    Findings: Degenerative changes.  No acute fracture or injury    Interpreted contemporaneously with treatment by Dr. Moss, independently viewed by me        Procedures           ED Course  ED Course as of Oct 16 0431   Tue Oct 16, 2018   0431 I review the CT scans which are reassuring.  Patient has no worrisome features to her physical exam here today.  We'll discharge home in good condition with the usual \"return to ER\" instructions for head injury " concerns.  [EMMANUEL]      ED Course User Index  [EMMANUEL] Navarro Sutherland MD                  MDM  Number of Diagnoses or Management Options  Injury of head, initial encounter:   Vertigo:      Amount and/or Complexity of Data Reviewed  Tests in the radiology section of CPT®: ordered and reviewed  Independent visualization of images, tracings, or specimens: yes    Risk of Complications, Morbidity, and/or Mortality  Presenting problems: moderate  Diagnostic procedures: moderate  Management options: moderate          Final diagnoses:   Injury of head, initial encounter   Vertigo            Navarro Sutherland MD  10/16/18 0431

## 2018-10-17 ENCOUNTER — DOCUMENTATION (OUTPATIENT)
Dept: PHYSICAL THERAPY | Facility: HOSPITAL | Age: 78
End: 2018-10-17

## 2018-10-17 DIAGNOSIS — R26.9 GAIT ABNORMALITY: ICD-10-CM

## 2018-10-17 DIAGNOSIS — R29.6 FALLS FREQUENTLY: ICD-10-CM

## 2018-10-17 DIAGNOSIS — H83.2X9 VESTIBULAR DYSFUNCTION, UNSPECIFIED LATERALITY: ICD-10-CM

## 2018-10-17 DIAGNOSIS — H81.12 BPPV (BENIGN PAROXYSMAL POSITIONAL VERTIGO), LEFT: ICD-10-CM

## 2018-10-17 DIAGNOSIS — R42 DIZZINESS: Primary | ICD-10-CM

## 2018-10-17 NOTE — THERAPY DISCHARGE NOTE
Outpatient Physical Therapy Ortho Discharge       Patient Name: Joya Singh  : 1940  MRN: [id number]  Today's Date: 10/17/2018      Visit Date: 10/17/2018      OP PT Discharge Summary  Date of Discharge: 10/17/18  Reason for Discharge: Unable to participate  Outcomes Achieved: Unable to make functional progress toward goals at this time  Discharge Destination: Home health services  Discharge Instructions/Additional Comments: Ms. Singh reported she has progressively worsened since evaluatoin and she is having difficulty with leaving the home.  Given her fall risk and need for intervention, she would benefit from home health vestibular PT services, specifically including gaze stabilization. Her outpatient encounter is now discharged.        Gigi Olmstead, PT  10/17/2018

## 2018-10-18 ENCOUNTER — TELEPHONE (OUTPATIENT)
Dept: INTERNAL MEDICINE | Facility: CLINIC | Age: 78
End: 2018-10-18

## 2018-10-18 NOTE — TELEPHONE ENCOUNTER
PT  AWARE  SAMPLES AVAILABLE FOR  TRESIBA.  ALSO SENDING PAPERWORK  FOR PATIENT ASSISTACE    ----- Message from Chari Mercado MD sent at 10/16/2018  5:14 PM EDT -----  Ok to take her hydrocodone if pain that bad but she needs to watch for worsening dizziness and avoid getting up and walking around if she has taken the med.    ----- Message -----  From: Gaviota Bowles MA  Sent: 10/16/2018  12:16 PM  To: Chari Mercado MD    Pt  Wanting to know  If it  Ok to take the  Hydrocodone,  She is having  Dizziness,   As you  Are aware,  She went to er.  They gave her  Tylenol  But that  Done  Nothing.     [phone]    Also  Wants  To see if  She can  Get samples of   tresiba   200/units    Also wants to do patient assistance  For her  tresiba

## 2018-10-18 NOTE — TELEPHONE ENCOUNTER
tresiba  200/ml    2 samples provided          ----- Message from Chari Mercado MD sent at 10/16/2018  5:14 PM EDT -----  Ok to take her hydrocodone if pain that bad but she needs to watch for worsening dizziness and avoid getting up and walking around if she has taken the med.    ----- Message -----  From: Gaviota Bowles MA  Sent: 10/16/2018  12:16 PM  To: Chari Mercado MD    Pt  Wanting to know  If it  Ok to take the  Hydrocodone,  She is having  Dizziness,   As you  Are aware,  She went to er.  They gave her  Tylenol  But that  Done  Nothing.     [phone]    Also  Wants  To see if  She can  Get samples of   tresiba   200/units    Also wants to do patient assistance  For her  tresiba

## 2018-10-19 ENCOUNTER — TELEPHONE (OUTPATIENT)
Dept: INTERNAL MEDICINE | Facility: CLINIC | Age: 78
End: 2018-10-19

## 2018-10-19 DIAGNOSIS — R29.6 FREQUENT FALLS: Primary | ICD-10-CM

## 2018-10-19 DIAGNOSIS — R42 DIZZINESS: ICD-10-CM

## 2018-10-19 DIAGNOSIS — G62.9 PERIPHERAL POLYNEUROPATHY: ICD-10-CM

## 2018-10-19 NOTE — TELEPHONE ENCOUNTER
Orders placed.  ----- Message from Chari Mercado MD sent at 10/17/2018  4:31 PM EDT -----  Regarding: RE:  Vestibular PT  Johnna,    Will you put in orders for  for mrs singh as requested below.    Thanks.       ----- Message -----  From: Gigi Olmstead, PT  Sent: 10/17/2018  10:33 AM  To: Chari Mercado MD  Subject:  Vestibular PT                                 Dr. Mercado-    Could you please order home health therapy with a vestibular PT - Michaela or Richard both provide this service with [facility] - for Ms. Singh?  She is unable to attend outpatient services safely at this time but would benefit from training in the home.    -Benson

## 2018-10-23 ENCOUNTER — TELEPHONE (OUTPATIENT)
Dept: INTERNAL MEDICINE | Facility: CLINIC | Age: 78
End: 2018-10-23

## 2018-10-23 NOTE — TELEPHONE ENCOUNTER
Per Dr. Garcia (verbally), advised Viktoria that nystagmus is most likely post-concussive from fall, and patient has a history of many of these symptoms already.  Advised to push fluids and repeat orthostatic BP's tomorrow.  Dr. Garcia stated that she may have to be worked in with Joie or Dr. Salas tomorrow if symptoms worsen before Dr. Garcia is back on Thursday.  Viktoria voiced understanding.    ----- Message from Gaviota Bowles MA sent at 10/23/2018  5:07 PM EDT -----  Regarding: FW: HOME HEALTH      ----- Message -----  From: Keren Garcia MD  Sent: 10/23/2018   4:43 PM  To: Gaviota Bowles MA  Subject: RE: HOME HEALTH                                  I reviewed ER note from fall and she had normal CT of her. I want her to push fluids and repeat orthostatic BP's tomorrow. If still having issues, I can see her Thursday or Friday.     ----- Message -----  From: Gaviota Bowles MA  Sent: 10/23/2018   3:58 PM  To: Keren Garcia MD  Subject: FW: HOME HEALTH                                      ----- Message -----  From: Anmol Deb  Sent: 10/23/2018   3:40 PM  To: Izabella Brantley [facility] [place]  Subject: HOME HEALTH                                      ROGELIO PT    I received a call from Laura, [facility], to say that she did the evaluation on the patient. She said that she has a lot going on.    Patient did fall and hit her head last week    1. Central Vertigo  2. Fixed gaze mystagmus  3. Intermittent chest pain on right side  4. Feels like food gets stuck in throat when swallowing  5. Gets really fatigued  6. Dizziness  7. Nauseous constantly  8. Orthostatic hypertension  9. TIA issue    Not positional vertigo. bp readings on forearm were   Laying down - 118/58  Sat up 124/70  Stood up 96/42    Laura said that she is going back 2 x next week and 1 time a week for 2 weeks  Please call Laura with questions or to discuss [phone]    Thanks!  deb

## 2018-10-24 ENCOUNTER — TELEPHONE (OUTPATIENT)
Dept: INTERNAL MEDICINE | Facility: CLINIC | Age: 78
End: 2018-10-24

## 2018-10-24 NOTE — TELEPHONE ENCOUNTER
"Returned phone call from Dahiana with home health, she states that she did patients orthostatics, when lying, patients bp was 140/60, sitting-168/60, mayra standing-128/54. I told Dahiana that I had spoke to patient earlier and had relayed the message from Dr. Garcia, and that patient stated that she was going to wait until her appt next week with Dr. Mercado on Tuesday. Dahiana said that patient told her that she felt like she needed to be in the hospital, she called one of her sons, who is her POA and told him that she was going to call an ambulance and go to the hospital, Dahiana stated that the son told her that she did not need to go to the hospital. She then called her other son, who is not her POA and asked him to come get her so that he could bring her to our office. Dahiana told her that she didn't have an appt today, and that she was unsure about her just walking in to be seen. Patient told Dahiana that she was going to come to the office because \"We were expecting her.\" Dahiana says that she wanted to call to let us know and that she will return to patients home on Monday to check on her.   ----- Message from Beth Farley MA sent at 10/24/2018 12:25 PM EDT -----  Regarding: FW: home health at house now  I forwarding this to you, since you dealt with this earlier today.    ----- Message -----  From: Heydi Jackson  Sent: 10/24/2018  12:19 PM  To: Izabella Mercado [facility]  Subject: home health at house now                         ÁLVARO Talbot, Home Health at patient's home right now.  Has concern about patient, but is not familiar with her circumstances.  Requesting to speak to medical assistant of PCP.    [phone]      "

## 2018-10-25 ENCOUNTER — APPOINTMENT (OUTPATIENT)
Dept: GENERAL RADIOLOGY | Facility: HOSPITAL | Age: 78
End: 2018-10-25

## 2018-10-25 ENCOUNTER — TELEPHONE (OUTPATIENT)
Dept: INTERNAL MEDICINE | Facility: CLINIC | Age: 78
End: 2018-10-25

## 2018-10-25 ENCOUNTER — HOSPITAL ENCOUNTER (INPATIENT)
Facility: HOSPITAL | Age: 78
LOS: 5 days | Discharge: HOME HEALTH | End: 2018-10-30
Attending: EMERGENCY MEDICINE | Admitting: INTERNAL MEDICINE

## 2018-10-25 DIAGNOSIS — E11.43 TYPE 2 DIABETES MELLITUS WITH AUTONOMIC NEUROPATHY (HCC): ICD-10-CM

## 2018-10-25 DIAGNOSIS — Z79.4 TYPE 2 DIABETES MELLITUS WITH DIABETIC AUTONOMIC NEUROPATHY, WITH LONG-TERM CURRENT USE OF INSULIN (HCC): ICD-10-CM

## 2018-10-25 DIAGNOSIS — K92.2 GASTROINTESTINAL HEMORRHAGE, UNSPECIFIED GASTROINTESTINAL HEMORRHAGE TYPE: ICD-10-CM

## 2018-10-25 DIAGNOSIS — Z79.4 TYPE 2 DIABETES MELLITUS WITH DIABETIC NEUROPATHY, WITH LONG-TERM CURRENT USE OF INSULIN (HCC): ICD-10-CM

## 2018-10-25 DIAGNOSIS — E10.9 BRITTLE DIABETES MELLITUS (HCC): ICD-10-CM

## 2018-10-25 DIAGNOSIS — E11.43 TYPE 2 DIABETES MELLITUS WITH DIABETIC AUTONOMIC NEUROPATHY, WITH LONG-TERM CURRENT USE OF INSULIN (HCC): ICD-10-CM

## 2018-10-25 DIAGNOSIS — E11.40 TYPE 2 DIABETES MELLITUS WITH DIABETIC NEUROPATHY, WITH LONG-TERM CURRENT USE OF INSULIN (HCC): ICD-10-CM

## 2018-10-25 DIAGNOSIS — D50.9 MICROCYTIC ANEMIA: ICD-10-CM

## 2018-10-25 DIAGNOSIS — N30.00 ACUTE CYSTITIS WITHOUT HEMATURIA: ICD-10-CM

## 2018-10-25 DIAGNOSIS — J18.9 COMMUNITY ACQUIRED PNEUMONIA OF LEFT LUNG, UNSPECIFIED PART OF LUNG: Primary | ICD-10-CM

## 2018-10-25 LAB
ABO GROUP BLD: NORMAL
ABO GROUP BLD: NORMAL
ALBUMIN SERPL-MCNC: 3.8 G/DL (ref 3.5–5.2)
ALBUMIN/GLOB SERPL: 1.7 G/DL
ALP SERPL-CCNC: 113 U/L (ref 40–129)
ALT SERPL W P-5'-P-CCNC: 9 U/L (ref 5–33)
ANION GAP SERPL CALCULATED.3IONS-SCNC: 10.9 MMOL/L
ANION GAP SERPL CALCULATED.3IONS-SCNC: 12.1 MMOL/L
APTT PPP: 32.5 SECONDS (ref 24.3–38.1)
AST SERPL-CCNC: 10 U/L (ref 5–32)
BACTERIA UR QL AUTO: ABNORMAL /HPF
BASOPHILS # BLD AUTO: 0.08 10*3/MM3 (ref 0–0.2)
BASOPHILS NFR BLD AUTO: 1.4 % (ref 0–2)
BILIRUB SERPL-MCNC: 0.4 MG/DL (ref 0.2–1.2)
BILIRUB UR QL STRIP: NEGATIVE
BLD GP AB SCN SERPL QL: NEGATIVE
BUN BLD-MCNC: 15 MG/DL (ref 8–23)
BUN BLD-MCNC: 18 MG/DL (ref 8–23)
BUN/CREAT SERPL: 7.9 (ref 7–25)
BUN/CREAT SERPL: 9.8 (ref 7–25)
CALCIUM SPEC-SCNC: 8.6 MG/DL (ref 8.8–10.5)
CALCIUM SPEC-SCNC: 8.7 MG/DL (ref 8.8–10.5)
CHLORIDE SERPL-SCNC: 102 MMOL/L (ref 98–107)
CHLORIDE SERPL-SCNC: 102 MMOL/L (ref 98–107)
CLARITY UR: CLEAR
CO2 SERPL-SCNC: 25.9 MMOL/L (ref 22–29)
CO2 SERPL-SCNC: 26.1 MMOL/L (ref 22–29)
COLOR UR: YELLOW
CREAT BLD-MCNC: 1.83 MG/DL (ref 0.57–1)
CREAT BLD-MCNC: 1.89 MG/DL (ref 0.57–1)
D DIMER PPP FEU-MCNC: 0.32 MCGFEU/ML (ref 0–0.46)
D-LACTATE SERPL-SCNC: 1 MMOL/L (ref 0.5–2)
DEPRECATED RDW RBC AUTO: 69.4 FL (ref 37–54)
EOSINOPHIL # BLD AUTO: 0.29 10*3/MM3 (ref 0.1–0.3)
EOSINOPHIL NFR BLD AUTO: 5 % (ref 0–4)
ERYTHROCYTE [DISTWIDTH] IN BLOOD BY AUTOMATED COUNT: 18.9 % (ref 11.5–14.5)
GFR SERPL CREATININE-BSD FRML MDRD: 26 ML/MIN/1.73
GFR SERPL CREATININE-BSD FRML MDRD: 27 ML/MIN/1.73
GLOBULIN UR ELPH-MCNC: 2.3 GM/DL
GLUCOSE BLD-MCNC: 192 MG/DL (ref 65–99)
GLUCOSE BLD-MCNC: 200 MG/DL (ref 65–99)
GLUCOSE BLDC GLUCOMTR-MCNC: 193 MG/DL (ref 70–130)
GLUCOSE UR STRIP-MCNC: NEGATIVE MG/DL
HCT VFR BLD AUTO: 21.4 % (ref 37–47)
HGB BLD-MCNC: 7 G/DL (ref 12–16)
HGB UR QL STRIP.AUTO: ABNORMAL
HYALINE CASTS UR QL AUTO: ABNORMAL /LPF
IMM GRANULOCYTES # BLD: 0.04 10*3/MM3 (ref 0–0.03)
IMM GRANULOCYTES NFR BLD: 0.7 % (ref 0–0.5)
INR PPP: 1.16 (ref 0.9–1.1)
INR PPP: 1.19 (ref 0.9–1.1)
KETONES UR QL STRIP: NEGATIVE
LEUKOCYTE ESTERASE UR QL STRIP.AUTO: ABNORMAL
LYMPHOCYTES # BLD AUTO: 2.1 10*3/MM3 (ref 0.6–4.8)
LYMPHOCYTES NFR BLD AUTO: 36 % (ref 20–45)
MAGNESIUM SERPL-MCNC: 2.1 MG/DL (ref 1.7–2.5)
MCH RBC QN AUTO: 33.2 PG (ref 27–31)
MCHC RBC AUTO-ENTMCNC: 32.7 G/DL (ref 31–37)
MCV RBC AUTO: 101.4 FL (ref 81–99)
MONOCYTES # BLD AUTO: 0.3 10*3/MM3 (ref 0–1)
MONOCYTES NFR BLD AUTO: 5.1 % (ref 3–8)
NEUTROPHILS # BLD AUTO: 3.03 10*3/MM3 (ref 1.5–8.3)
NEUTROPHILS NFR BLD AUTO: 51.8 % (ref 45–70)
NITRITE UR QL STRIP: POSITIVE
NRBC BLD MANUAL-RTO: 0 /100 WBC (ref 0–0)
NT-PROBNP SERPL-MCNC: 513 PG/ML (ref 0–1800)
PH UR STRIP.AUTO: 7.5 [PH] (ref 4.5–8)
PLATELET # BLD AUTO: 371 10*3/MM3 (ref 140–500)
PMV BLD AUTO: 11.1 FL (ref 7.4–10.4)
POTASSIUM BLD-SCNC: 3.9 MMOL/L (ref 3.5–5.2)
POTASSIUM BLD-SCNC: 4.5 MMOL/L (ref 3.5–5.2)
PROT SERPL-MCNC: 6.1 G/DL (ref 6–8.5)
PROT UR QL STRIP: ABNORMAL
PROTHROMBIN TIME: 14.9 SECONDS (ref 12.1–15)
PROTHROMBIN TIME: 15.2 SECONDS (ref 12.1–15)
RBC # BLD AUTO: 2.11 10*6/MM3 (ref 4.2–5.4)
RBC # UR: ABNORMAL /HPF
REF LAB TEST METHOD: ABNORMAL
RH BLD: POSITIVE
RH BLD: POSITIVE
SODIUM BLD-SCNC: 139 MMOL/L (ref 136–145)
SODIUM BLD-SCNC: 140 MMOL/L (ref 136–145)
SP GR UR STRIP: 1.02 (ref 1–1.03)
SQUAMOUS #/AREA URNS HPF: ABNORMAL /HPF
T&S EXPIRATION DATE: NORMAL
TROPONIN T SERPL-MCNC: <0.01 NG/ML (ref 0–0.03)
TROPONIN T SERPL-MCNC: <0.01 NG/ML (ref 0–0.03)
TSH SERPL DL<=0.05 MIU/L-ACNC: 1.81 MIU/ML (ref 0.27–4.2)
UROBILINOGEN UR QL STRIP: ABNORMAL
WBC NRBC COR # BLD: 5.84 10*3/MM3 (ref 4.8–10.8)
WBC UR QL AUTO: ABNORMAL /HPF

## 2018-10-25 PROCEDURE — 85610 PROTHROMBIN TIME: CPT | Performed by: PHYSICIAN ASSISTANT

## 2018-10-25 PROCEDURE — 80053 COMPREHEN METABOLIC PANEL: CPT | Performed by: PHYSICIAN ASSISTANT

## 2018-10-25 PROCEDURE — P9612 CATHETERIZE FOR URINE SPEC: HCPCS

## 2018-10-25 PROCEDURE — 86900 BLOOD TYPING SEROLOGIC ABO: CPT | Performed by: PHYSICIAN ASSISTANT

## 2018-10-25 PROCEDURE — 86923 COMPATIBILITY TEST ELECTRIC: CPT

## 2018-10-25 PROCEDURE — 82962 GLUCOSE BLOOD TEST: CPT

## 2018-10-25 PROCEDURE — 84484 ASSAY OF TROPONIN QUANT: CPT | Performed by: HOSPITALIST

## 2018-10-25 PROCEDURE — 63710000001 INSULIN ASPART PER 5 UNITS: Performed by: HOSPITALIST

## 2018-10-25 PROCEDURE — 99222 1ST HOSP IP/OBS MODERATE 55: CPT | Performed by: HOSPITALIST

## 2018-10-25 PROCEDURE — 25010000002 CEFTRIAXONE SODIUM-DEXTROSE 1-3.74 GM-%(50ML) RECONSTITUTED SOLUTION: Performed by: PHYSICIAN ASSISTANT

## 2018-10-25 PROCEDURE — 85025 COMPLETE CBC W/AUTO DIFF WBC: CPT | Performed by: PHYSICIAN ASSISTANT

## 2018-10-25 PROCEDURE — 86901 BLOOD TYPING SEROLOGIC RH(D): CPT | Performed by: PHYSICIAN ASSISTANT

## 2018-10-25 PROCEDURE — 36430 TRANSFUSION BLD/BLD COMPNT: CPT

## 2018-10-25 PROCEDURE — 85379 FIBRIN DEGRADATION QUANT: CPT | Performed by: PHYSICIAN ASSISTANT

## 2018-10-25 PROCEDURE — 87077 CULTURE AEROBIC IDENTIFY: CPT | Performed by: PHYSICIAN ASSISTANT

## 2018-10-25 PROCEDURE — 85730 THROMBOPLASTIN TIME PARTIAL: CPT | Performed by: PHYSICIAN ASSISTANT

## 2018-10-25 PROCEDURE — 87186 SC STD MICRODIL/AGAR DIL: CPT | Performed by: PHYSICIAN ASSISTANT

## 2018-10-25 PROCEDURE — 85025 COMPLETE CBC W/AUTO DIFF WBC: CPT | Performed by: HOSPITALIST

## 2018-10-25 PROCEDURE — 83735 ASSAY OF MAGNESIUM: CPT | Performed by: PHYSICIAN ASSISTANT

## 2018-10-25 PROCEDURE — 87040 BLOOD CULTURE FOR BACTERIA: CPT | Performed by: PHYSICIAN ASSISTANT

## 2018-10-25 PROCEDURE — 84443 ASSAY THYROID STIM HORMONE: CPT | Performed by: PHYSICIAN ASSISTANT

## 2018-10-25 PROCEDURE — 87086 URINE CULTURE/COLONY COUNT: CPT | Performed by: PHYSICIAN ASSISTANT

## 2018-10-25 PROCEDURE — 71045 X-RAY EXAM CHEST 1 VIEW: CPT

## 2018-10-25 PROCEDURE — 86900 BLOOD TYPING SEROLOGIC ABO: CPT

## 2018-10-25 PROCEDURE — 93005 ELECTROCARDIOGRAM TRACING: CPT | Performed by: EMERGENCY MEDICINE

## 2018-10-25 PROCEDURE — 99285 EMERGENCY DEPT VISIT HI MDM: CPT | Performed by: PHYSICIAN ASSISTANT

## 2018-10-25 PROCEDURE — 86901 BLOOD TYPING SEROLOGIC RH(D): CPT

## 2018-10-25 PROCEDURE — 99285 EMERGENCY DEPT VISIT HI MDM: CPT

## 2018-10-25 PROCEDURE — 25010000002 AZITHROMYCIN: Performed by: PHYSICIAN ASSISTANT

## 2018-10-25 PROCEDURE — 86850 RBC ANTIBODY SCREEN: CPT | Performed by: PHYSICIAN ASSISTANT

## 2018-10-25 PROCEDURE — 83880 ASSAY OF NATRIURETIC PEPTIDE: CPT | Performed by: PHYSICIAN ASSISTANT

## 2018-10-25 PROCEDURE — 85610 PROTHROMBIN TIME: CPT | Performed by: HOSPITALIST

## 2018-10-25 PROCEDURE — 84484 ASSAY OF TROPONIN QUANT: CPT | Performed by: PHYSICIAN ASSISTANT

## 2018-10-25 PROCEDURE — 81001 URINALYSIS AUTO W/SCOPE: CPT | Performed by: PHYSICIAN ASSISTANT

## 2018-10-25 PROCEDURE — P9016 RBC LEUKOCYTES REDUCED: HCPCS

## 2018-10-25 PROCEDURE — 83605 ASSAY OF LACTIC ACID: CPT | Performed by: PHYSICIAN ASSISTANT

## 2018-10-25 RX ORDER — FUROSEMIDE 20 MG/1
20 TABLET ORAL DAILY
Status: DISCONTINUED | OUTPATIENT
Start: 2018-10-26 | End: 2018-10-26

## 2018-10-25 RX ORDER — SODIUM CHLORIDE 0.9 % (FLUSH) 0.9 %
3 SYRINGE (ML) INJECTION EVERY 12 HOURS SCHEDULED
Status: DISCONTINUED | OUTPATIENT
Start: 2018-10-25 | End: 2018-10-30 | Stop reason: HOSPADM

## 2018-10-25 RX ORDER — MIDODRINE HYDROCHLORIDE 5 MG/1
2.5 TABLET ORAL 2 TIMES DAILY
Status: DISCONTINUED | OUTPATIENT
Start: 2018-10-25 | End: 2018-10-30 | Stop reason: HOSPADM

## 2018-10-25 RX ORDER — NYSTATIN 100000 [USP'U]/G
POWDER TOPICAL 3 TIMES DAILY
Status: DISCONTINUED | OUTPATIENT
Start: 2018-10-25 | End: 2018-10-30 | Stop reason: HOSPADM

## 2018-10-25 RX ORDER — LEVOTHYROXINE SODIUM 88 UG/1
88 TABLET ORAL
Status: DISCONTINUED | OUTPATIENT
Start: 2018-10-26 | End: 2018-10-30 | Stop reason: HOSPADM

## 2018-10-25 RX ORDER — DULOXETIN HYDROCHLORIDE 30 MG/1
30 CAPSULE, DELAYED RELEASE ORAL DAILY
Status: DISCONTINUED | OUTPATIENT
Start: 2018-10-26 | End: 2018-10-30 | Stop reason: HOSPADM

## 2018-10-25 RX ORDER — CEFTRIAXONE 1 G/50ML
1 INJECTION, SOLUTION INTRAVENOUS ONCE
Status: COMPLETED | OUTPATIENT
Start: 2018-10-25 | End: 2018-10-25

## 2018-10-25 RX ORDER — NICOTINE POLACRILEX 4 MG
15 LOZENGE BUCCAL
Status: DISCONTINUED | OUTPATIENT
Start: 2018-10-25 | End: 2018-10-30 | Stop reason: HOSPADM

## 2018-10-25 RX ORDER — SODIUM CHLORIDE 0.9 % (FLUSH) 0.9 %
10 SYRINGE (ML) INJECTION AS NEEDED
Status: DISCONTINUED | OUTPATIENT
Start: 2018-10-25 | End: 2018-10-27

## 2018-10-25 RX ORDER — ACETAMINOPHEN 325 MG/1
650 TABLET ORAL EVERY 6 HOURS PRN
Status: DISCONTINUED | OUTPATIENT
Start: 2018-10-25 | End: 2018-10-30 | Stop reason: HOSPADM

## 2018-10-25 RX ORDER — FUROSEMIDE 20 MG/1
20 TABLET ORAL DAILY
Status: ON HOLD | COMMUNITY
End: 2018-10-30

## 2018-10-25 RX ORDER — SODIUM CHLORIDE 9 MG/ML
INJECTION, SOLUTION INTRAVENOUS
Status: COMPLETED
Start: 2018-10-25 | End: 2018-10-25

## 2018-10-25 RX ORDER — DEXTROSE MONOHYDRATE 25 G/50ML
25 INJECTION, SOLUTION INTRAVENOUS
Status: DISCONTINUED | OUTPATIENT
Start: 2018-10-25 | End: 2018-10-30 | Stop reason: HOSPADM

## 2018-10-25 RX ORDER — SODIUM CHLORIDE 9 MG/ML
40 INJECTION, SOLUTION INTRAVENOUS AS NEEDED
Status: DISCONTINUED | OUTPATIENT
Start: 2018-10-25 | End: 2018-10-30 | Stop reason: HOSPADM

## 2018-10-25 RX ORDER — HYDROCODONE BITARTRATE AND ACETAMINOPHEN 5; 325 MG/1; MG/1
1 TABLET ORAL EVERY 4 HOURS PRN
COMMUNITY
End: 2018-10-30 | Stop reason: HOSPADM

## 2018-10-25 RX ORDER — SODIUM CHLORIDE 0.9 % (FLUSH) 0.9 %
3-10 SYRINGE (ML) INJECTION AS NEEDED
Status: DISCONTINUED | OUTPATIENT
Start: 2018-10-25 | End: 2018-10-27

## 2018-10-25 RX ORDER — PANTOPRAZOLE SODIUM 40 MG/10ML
40 INJECTION, POWDER, LYOPHILIZED, FOR SOLUTION INTRAVENOUS ONCE
Status: COMPLETED | OUTPATIENT
Start: 2018-10-25 | End: 2018-10-25

## 2018-10-25 RX ADMIN — PANTOPRAZOLE SODIUM 40 MG: 40 INJECTION, POWDER, FOR SOLUTION INTRAVENOUS at 13:26

## 2018-10-25 RX ADMIN — SODIUM CHLORIDE 8 MG/HR: 900 INJECTION INTRAVENOUS at 20:58

## 2018-10-25 RX ADMIN — INSULIN ASPART 2 UNITS: 100 INJECTION, SOLUTION INTRAVENOUS; SUBCUTANEOUS at 22:19

## 2018-10-25 RX ADMIN — NYSTATIN: 100000 POWDER TOPICAL at 22:19

## 2018-10-25 RX ADMIN — SODIUM CHLORIDE 8 MG/HR: 900 INJECTION INTRAVENOUS at 13:26

## 2018-10-25 RX ADMIN — CEFTRIAXONE 1 G: 1 INJECTION, SOLUTION INTRAVENOUS at 14:09

## 2018-10-25 RX ADMIN — FAMOTIDINE 20 MG: 10 INJECTION, SOLUTION INTRAVENOUS at 12:45

## 2018-10-25 RX ADMIN — AZITHROMYCIN MONOHYDRATE 500 MG: 500 INJECTION, POWDER, LYOPHILIZED, FOR SOLUTION INTRAVENOUS at 14:58

## 2018-10-25 RX ADMIN — SODIUM CHLORIDE: 900 INJECTION, SOLUTION INTRAVENOUS at 16:30

## 2018-10-25 RX ADMIN — MIDODRINE HYDROCHLORIDE 2.5 MG: 5 TABLET ORAL at 22:20

## 2018-10-25 NOTE — TELEPHONE ENCOUNTER
----- Message from Deb Corea sent at 10/25/2018 11:56 AM EDT -----  Regarding: FW: ER      ----- Message -----  From: Deb Corea  Sent: 10/25/2018  11:23 AM  To: Deb Corea  Subject: ER                                               ÁLVARO PT    Patient called to say that she spoke to Heydi, (in our office), earlier and told her that she was going to the ER. Patient was calling back to say that she has not left for the ER yet because no one has been there to take her.  I told her that I would call an ambulance for her, and she said no, that her one son had spoke to the other one and his wife was coming to get her to take her to the ER. The daughter-in- law should be there to get her in a few minutes. She wanted to take her because she knows what is going on with the patient. Ms. Singh, was very upset.  I told her to try to stay calm until she got there.    deb

## 2018-10-26 ENCOUNTER — ANESTHESIA EVENT (OUTPATIENT)
Dept: PERIOP | Facility: HOSPITAL | Age: 78
End: 2018-10-26

## 2018-10-26 ENCOUNTER — APPOINTMENT (OUTPATIENT)
Dept: CT IMAGING | Facility: HOSPITAL | Age: 78
End: 2018-10-26

## 2018-10-26 ENCOUNTER — ANESTHESIA (OUTPATIENT)
Dept: PERIOP | Facility: HOSPITAL | Age: 78
End: 2018-10-26

## 2018-10-26 PROBLEM — K92.2 GASTROINTESTINAL HEMORRHAGE: Status: ACTIVE | Noted: 2018-10-25

## 2018-10-26 LAB
ABO + RH BLD: NORMAL
AMYLASE SERPL-CCNC: 23 U/L (ref 28–100)
ANION GAP SERPL CALCULATED.3IONS-SCNC: 12.3 MMOL/L
B PERT DNA SPEC QL NAA+PROBE: NOT DETECTED
BASOPHILS # BLD AUTO: 0.09 10*3/MM3 (ref 0–0.2)
BASOPHILS # BLD AUTO: 0.11 10*3/MM3 (ref 0–0.2)
BASOPHILS NFR BLD AUTO: 1.5 % (ref 0–2)
BASOPHILS NFR BLD AUTO: 1.6 % (ref 0–2)
BH BB BLOOD EXPIRATION DATE: NORMAL
BH BB BLOOD TYPE BARCODE: 7300
BH BB DISPENSE STATUS: NORMAL
BH BB PRODUCT CODE: NORMAL
BH BB UNIT NUMBER: NORMAL
BUN BLD-MCNC: 14 MG/DL (ref 8–23)
BUN/CREAT SERPL: 7.5 (ref 7–25)
C PNEUM DNA NPH QL NAA+NON-PROBE: NOT DETECTED
CALCIUM SPEC-SCNC: 9 MG/DL (ref 8.8–10.5)
CHLORIDE SERPL-SCNC: 106 MMOL/L (ref 98–107)
CO2 SERPL-SCNC: 25.7 MMOL/L (ref 22–29)
CREAT BLD-MCNC: 1.87 MG/DL (ref 0.57–1)
DEPRECATED RDW RBC AUTO: 69.8 FL (ref 37–54)
DEPRECATED RDW RBC AUTO: 70.2 FL (ref 37–54)
EOSINOPHIL # BLD AUTO: 0.37 10*3/MM3 (ref 0.1–0.3)
EOSINOPHIL # BLD AUTO: 0.39 10*3/MM3 (ref 0.1–0.3)
EOSINOPHIL NFR BLD AUTO: 5.3 % (ref 0–4)
EOSINOPHIL NFR BLD AUTO: 6.6 % (ref 0–4)
ERYTHROCYTE [DISTWIDTH] IN BLOOD BY AUTOMATED COUNT: 19.7 % (ref 11.5–14.5)
ERYTHROCYTE [DISTWIDTH] IN BLOOD BY AUTOMATED COUNT: 19.9 % (ref 11.5–14.5)
FLUAV H1 2009 PAND RNA NPH QL NAA+PROBE: NOT DETECTED
FLUAV H1 HA GENE NPH QL NAA+PROBE: NOT DETECTED
FLUAV H3 RNA NPH QL NAA+PROBE: NOT DETECTED
FLUAV SUBTYP SPEC NAA+PROBE: NOT DETECTED
FLUBV RNA ISLT QL NAA+PROBE: NOT DETECTED
GFR SERPL CREATININE-BSD FRML MDRD: 26 ML/MIN/1.73
GLUCOSE BLD-MCNC: 156 MG/DL (ref 65–99)
GLUCOSE BLDC GLUCOMTR-MCNC: 162 MG/DL (ref 70–130)
GLUCOSE BLDC GLUCOMTR-MCNC: 164 MG/DL (ref 70–130)
GLUCOSE BLDC GLUCOMTR-MCNC: 184 MG/DL (ref 70–130)
GLUCOSE BLDC GLUCOMTR-MCNC: 197 MG/DL (ref 70–130)
HADV DNA SPEC NAA+PROBE: NOT DETECTED
HBA1C MFR BLD: 8.2 % (ref 4.8–5.6)
HCOV 229E RNA SPEC QL NAA+PROBE: NOT DETECTED
HCOV HKU1 RNA SPEC QL NAA+PROBE: NOT DETECTED
HCOV NL63 RNA SPEC QL NAA+PROBE: NOT DETECTED
HCOV OC43 RNA SPEC QL NAA+PROBE: NOT DETECTED
HCT VFR BLD AUTO: 23.9 % (ref 37–47)
HCT VFR BLD AUTO: 24.6 % (ref 37–47)
HGB BLD-MCNC: 7.8 G/DL (ref 12–16)
HGB BLD-MCNC: 7.9 G/DL (ref 12–16)
HMPV RNA NPH QL NAA+NON-PROBE: NOT DETECTED
HPIV1 RNA SPEC QL NAA+PROBE: NOT DETECTED
HPIV2 RNA SPEC QL NAA+PROBE: NOT DETECTED
HPIV3 RNA NPH QL NAA+PROBE: NOT DETECTED
HPIV4 P GENE NPH QL NAA+PROBE: NOT DETECTED
IMM GRANULOCYTES # BLD: 0.04 10*3/MM3 (ref 0–0.03)
IMM GRANULOCYTES # BLD: 0.08 10*3/MM3 (ref 0–0.03)
IMM GRANULOCYTES NFR BLD: 0.7 % (ref 0–0.5)
IMM GRANULOCYTES NFR BLD: 1.1 % (ref 0–0.5)
INR PPP: 1.15 (ref 0.9–1.1)
LIPASE SERPL-CCNC: 29 U/L (ref 13–60)
LYMPHOCYTES # BLD AUTO: 2.44 10*3/MM3 (ref 0.6–4.8)
LYMPHOCYTES # BLD AUTO: 2.73 10*3/MM3 (ref 0.6–4.8)
LYMPHOCYTES NFR BLD AUTO: 38.9 % (ref 20–45)
LYMPHOCYTES NFR BLD AUTO: 41.4 % (ref 20–45)
M PNEUMO IGG SER IA-ACNC: NOT DETECTED
MCH RBC QN AUTO: 31.2 PG (ref 27–31)
MCH RBC QN AUTO: 31.8 PG (ref 27–31)
MCHC RBC AUTO-ENTMCNC: 32.1 G/DL (ref 31–37)
MCHC RBC AUTO-ENTMCNC: 32.6 G/DL (ref 31–37)
MCV RBC AUTO: 97.2 FL (ref 81–99)
MCV RBC AUTO: 97.6 FL (ref 81–99)
MONOCYTES # BLD AUTO: 0.32 10*3/MM3 (ref 0–1)
MONOCYTES # BLD AUTO: 0.36 10*3/MM3 (ref 0–1)
MONOCYTES NFR BLD AUTO: 5.1 % (ref 3–8)
MONOCYTES NFR BLD AUTO: 5.4 % (ref 3–8)
NEUTROPHILS # BLD AUTO: 2.61 10*3/MM3 (ref 1.5–8.3)
NEUTROPHILS # BLD AUTO: 3.37 10*3/MM3 (ref 1.5–8.3)
NEUTROPHILS NFR BLD AUTO: 44.4 % (ref 45–70)
NEUTROPHILS NFR BLD AUTO: 48 % (ref 45–70)
NRBC BLD MANUAL-RTO: 0 /100 WBC (ref 0–0)
NRBC BLD MANUAL-RTO: 0 /100 WBC (ref 0–0)
PLATELET # BLD AUTO: 372 10*3/MM3 (ref 140–500)
PLATELET # BLD AUTO: 402 10*3/MM3 (ref 140–500)
PMV BLD AUTO: 11.1 FL (ref 7.4–10.4)
PMV BLD AUTO: 11.4 FL (ref 7.4–10.4)
POTASSIUM BLD-SCNC: 4.1 MMOL/L (ref 3.5–5.2)
PROCALCITONIN SERPL-MCNC: 0.19 NG/ML (ref 0.1–0.25)
PROTHROMBIN TIME: 14.8 SECONDS (ref 12.1–15)
RBC # BLD AUTO: 2.45 10*6/MM3 (ref 4.2–5.4)
RBC # BLD AUTO: 2.53 10*6/MM3 (ref 4.2–5.4)
RHINOVIRUS RNA SPEC NAA+PROBE: NOT DETECTED
RSV RNA NPH QL NAA+NON-PROBE: NOT DETECTED
SODIUM BLD-SCNC: 144 MMOL/L (ref 136–145)
UNIT  ABO: NORMAL
UNIT  RH: NORMAL
WBC NRBC COR # BLD: 5.89 10*3/MM3 (ref 4.8–10.8)
WBC NRBC COR # BLD: 7.02 10*3/MM3 (ref 4.8–10.8)

## 2018-10-26 PROCEDURE — 85610 PROTHROMBIN TIME: CPT | Performed by: HOSPITALIST

## 2018-10-26 PROCEDURE — 83036 HEMOGLOBIN GLYCOSYLATED A1C: CPT | Performed by: INTERNAL MEDICINE

## 2018-10-26 PROCEDURE — 80048 BASIC METABOLIC PNL TOTAL CA: CPT | Performed by: HOSPITALIST

## 2018-10-26 PROCEDURE — 94799 UNLISTED PULMONARY SVC/PX: CPT

## 2018-10-26 PROCEDURE — 87581 M.PNEUMON DNA AMP PROBE: CPT | Performed by: NURSE PRACTITIONER

## 2018-10-26 PROCEDURE — 87633 RESP VIRUS 12-25 TARGETS: CPT | Performed by: NURSE PRACTITIONER

## 2018-10-26 PROCEDURE — 99233 SBSQ HOSP IP/OBS HIGH 50: CPT | Performed by: NURSE PRACTITIONER

## 2018-10-26 PROCEDURE — 87486 CHLMYD PNEUM DNA AMP PROBE: CPT | Performed by: NURSE PRACTITIONER

## 2018-10-26 PROCEDURE — 93005 ELECTROCARDIOGRAM TRACING: CPT | Performed by: INTERNAL MEDICINE

## 2018-10-26 PROCEDURE — 84145 PROCALCITONIN (PCT): CPT | Performed by: NURSE PRACTITIONER

## 2018-10-26 PROCEDURE — 82150 ASSAY OF AMYLASE: CPT | Performed by: NURSE PRACTITIONER

## 2018-10-26 PROCEDURE — 82962 GLUCOSE BLOOD TEST: CPT

## 2018-10-26 PROCEDURE — 74176 CT ABD & PELVIS W/O CONTRAST: CPT

## 2018-10-26 PROCEDURE — 63710000001 INSULIN ASPART PER 5 UNITS: Performed by: HOSPITALIST

## 2018-10-26 PROCEDURE — 25010000002 PROPOFOL 10 MG/ML EMULSION: Performed by: NURSE ANESTHETIST, CERTIFIED REGISTERED

## 2018-10-26 PROCEDURE — 0 DIATRIZOATE MEGLUMINE & SODIUM PER 1 ML: Performed by: INTERNAL MEDICINE

## 2018-10-26 PROCEDURE — 25010000002 HYDROMORPHONE 1 MG/ML SOLUTION: Performed by: HOSPITALIST

## 2018-10-26 PROCEDURE — 85025 COMPLETE CBC W/AUTO DIFF WBC: CPT | Performed by: HOSPITALIST

## 2018-10-26 PROCEDURE — 99222 1ST HOSP IP/OBS MODERATE 55: CPT | Performed by: INTERNAL MEDICINE

## 2018-10-26 PROCEDURE — 43239 EGD BIOPSY SINGLE/MULTIPLE: CPT | Performed by: INTERNAL MEDICINE

## 2018-10-26 PROCEDURE — 0DJ08ZZ INSPECTION OF UPPER INTESTINAL TRACT, VIA NATURAL OR ARTIFICIAL OPENING ENDOSCOPIC: ICD-10-PCS | Performed by: INTERNAL MEDICINE

## 2018-10-26 PROCEDURE — 94640 AIRWAY INHALATION TREATMENT: CPT

## 2018-10-26 PROCEDURE — 93010 ELECTROCARDIOGRAM REPORT: CPT | Performed by: INTERNAL MEDICINE

## 2018-10-26 PROCEDURE — 25010000002 ONDANSETRON PER 1 MG: Performed by: HOSPITALIST

## 2018-10-26 PROCEDURE — 83690 ASSAY OF LIPASE: CPT | Performed by: NURSE PRACTITIONER

## 2018-10-26 PROCEDURE — 87798 DETECT AGENT NOS DNA AMP: CPT | Performed by: NURSE PRACTITIONER

## 2018-10-26 RX ORDER — NICOTINE POLACRILEX 4 MG
15 LOZENGE BUCCAL
Status: DISCONTINUED | OUTPATIENT
Start: 2018-10-26 | End: 2018-10-30 | Stop reason: HOSPADM

## 2018-10-26 RX ORDER — LIDOCAINE HYDROCHLORIDE 10 MG/ML
0.5 INJECTION, SOLUTION EPIDURAL; INFILTRATION; INTRACAUDAL; PERINEURAL ONCE AS NEEDED
Status: DISCONTINUED | OUTPATIENT
Start: 2018-10-26 | End: 2018-10-26 | Stop reason: HOSPADM

## 2018-10-26 RX ORDER — SODIUM CHLORIDE 9 MG/ML
40 INJECTION, SOLUTION INTRAVENOUS AS NEEDED
Status: DISCONTINUED | OUTPATIENT
Start: 2018-10-26 | End: 2018-10-26 | Stop reason: HOSPADM

## 2018-10-26 RX ORDER — DEXTROSE MONOHYDRATE 25 G/50ML
25 INJECTION, SOLUTION INTRAVENOUS
Status: DISCONTINUED | OUTPATIENT
Start: 2018-10-26 | End: 2018-10-30 | Stop reason: HOSPADM

## 2018-10-26 RX ORDER — MAGNESIUM CARB/ALUMINUM HYDROX 105-160MG
300 TABLET,CHEWABLE ORAL ONCE
Status: COMPLETED | OUTPATIENT
Start: 2018-10-27 | End: 2018-10-27

## 2018-10-26 RX ORDER — ALBUTEROL SULFATE 2.5 MG/3ML
2.5 SOLUTION RESPIRATORY (INHALATION) EVERY 6 HOURS PRN
Status: DISCONTINUED | OUTPATIENT
Start: 2018-10-26 | End: 2018-10-30 | Stop reason: HOSPADM

## 2018-10-26 RX ORDER — SODIUM CHLORIDE, SODIUM LACTATE, POTASSIUM CHLORIDE, CALCIUM CHLORIDE 600; 310; 30; 20 MG/100ML; MG/100ML; MG/100ML; MG/100ML
9 INJECTION, SOLUTION INTRAVENOUS CONTINUOUS
Status: DISCONTINUED | OUTPATIENT
Start: 2018-10-26 | End: 2018-10-27 | Stop reason: SDUPTHER

## 2018-10-26 RX ORDER — IPRATROPIUM BROMIDE AND ALBUTEROL SULFATE 2.5; .5 MG/3ML; MG/3ML
3 SOLUTION RESPIRATORY (INHALATION) ONCE
Status: COMPLETED | OUTPATIENT
Start: 2018-10-26 | End: 2018-10-26

## 2018-10-26 RX ORDER — SODIUM CHLORIDE 0.9 % (FLUSH) 0.9 %
1-10 SYRINGE (ML) INJECTION AS NEEDED
Status: DISCONTINUED | OUTPATIENT
Start: 2018-10-26 | End: 2018-10-26 | Stop reason: HOSPADM

## 2018-10-26 RX ORDER — MAGNESIUM HYDROXIDE 1200 MG/15ML
LIQUID ORAL AS NEEDED
Status: DISCONTINUED | OUTPATIENT
Start: 2018-10-26 | End: 2018-10-26 | Stop reason: HOSPADM

## 2018-10-26 RX ORDER — PROPOFOL 10 MG/ML
VIAL (ML) INTRAVENOUS AS NEEDED
Status: DISCONTINUED | OUTPATIENT
Start: 2018-10-26 | End: 2018-10-26 | Stop reason: SURG

## 2018-10-26 RX ORDER — SODIUM CHLORIDE 9 MG/ML
100 INJECTION, SOLUTION INTRAVENOUS CONTINUOUS
Status: DISCONTINUED | OUTPATIENT
Start: 2018-10-26 | End: 2018-10-27

## 2018-10-26 RX ORDER — HYDROMORPHONE HCL 110MG/55ML
0.5 PATIENT CONTROLLED ANALGESIA SYRINGE INTRAVENOUS
Status: DISCONTINUED | OUTPATIENT
Start: 2018-10-26 | End: 2018-10-26

## 2018-10-26 RX ORDER — ONDANSETRON 2 MG/ML
4 INJECTION INTRAMUSCULAR; INTRAVENOUS EVERY 6 HOURS PRN
Status: DISCONTINUED | OUTPATIENT
Start: 2018-10-26 | End: 2018-10-27

## 2018-10-26 RX ORDER — LIDOCAINE HYDROCHLORIDE 20 MG/ML
INJECTION, SOLUTION INFILTRATION; PERINEURAL AS NEEDED
Status: DISCONTINUED | OUTPATIENT
Start: 2018-10-26 | End: 2018-10-26 | Stop reason: SURG

## 2018-10-26 RX ORDER — ONDANSETRON 2 MG/ML
INJECTION INTRAMUSCULAR; INTRAVENOUS
Status: DISPENSED
Start: 2018-10-26 | End: 2018-10-26

## 2018-10-26 RX ORDER — SODIUM CHLORIDE 0.9 % (FLUSH) 0.9 %
3 SYRINGE (ML) INJECTION EVERY 12 HOURS SCHEDULED
Status: DISCONTINUED | OUTPATIENT
Start: 2018-10-26 | End: 2018-10-26 | Stop reason: HOSPADM

## 2018-10-26 RX ORDER — GLYCOPYRROLATE 0.2 MG/ML
INJECTION INTRAMUSCULAR; INTRAVENOUS AS NEEDED
Status: DISCONTINUED | OUTPATIENT
Start: 2018-10-26 | End: 2018-10-26 | Stop reason: SURG

## 2018-10-26 RX ADMIN — SODIUM CHLORIDE 100 ML/HR: 9 INJECTION, SOLUTION INTRAVENOUS at 12:12

## 2018-10-26 RX ADMIN — PROPOFOL 30 MG: 10 INJECTION, EMULSION INTRAVENOUS at 15:52

## 2018-10-26 RX ADMIN — SODIUM CHLORIDE 8 MG/HR: 900 INJECTION INTRAVENOUS at 09:40

## 2018-10-26 RX ADMIN — TOPICAL ANESTHETIC 2 SPRAY: 200 SPRAY DENTAL; PERIODONTAL at 15:45

## 2018-10-26 RX ADMIN — INSULIN ASPART 2 UNITS: 100 INJECTION, SOLUTION INTRAVENOUS; SUBCUTANEOUS at 21:21

## 2018-10-26 RX ADMIN — GLYCOPYRROLATE 0.1 MG: 0.2 INJECTION INTRAMUSCULAR; INTRAVENOUS at 15:46

## 2018-10-26 RX ADMIN — NYSTATIN: 100000 POWDER TOPICAL at 08:01

## 2018-10-26 RX ADMIN — LEVOTHYROXINE SODIUM 88 MCG: 0.09 TABLET ORAL at 06:41

## 2018-10-26 RX ADMIN — LIDOCAINE HYDROCHLORIDE 100 MG: 20 INJECTION, SOLUTION INFILTRATION; PERINEURAL at 15:49

## 2018-10-26 RX ADMIN — Medication 3 ML: at 07:58

## 2018-10-26 RX ADMIN — HYDROMORPHONE HYDROCHLORIDE 1 MG: 1 INJECTION, SOLUTION INTRAMUSCULAR; INTRAVENOUS; SUBCUTANEOUS at 23:26

## 2018-10-26 RX ADMIN — DIATRIZOATE MEGLUMINE AND DIATRIZOATE SODIUM 30 ML: 600; 100 SOLUTION ORAL; RECTAL at 16:55

## 2018-10-26 RX ADMIN — MIDODRINE HYDROCHLORIDE 2.5 MG: 5 TABLET ORAL at 21:18

## 2018-10-26 RX ADMIN — SODIUM CHLORIDE 8 MG/HR: 900 INJECTION INTRAVENOUS at 01:31

## 2018-10-26 RX ADMIN — ACETAMINOPHEN 650 MG: 325 TABLET, FILM COATED ORAL at 21:17

## 2018-10-26 RX ADMIN — PROPOFOL 20 MG: 10 INJECTION, EMULSION INTRAVENOUS at 15:55

## 2018-10-26 RX ADMIN — NYSTATIN: 100000 POWDER TOPICAL at 21:06

## 2018-10-26 RX ADMIN — PROPOFOL 70 MG: 10 INJECTION, EMULSION INTRAVENOUS at 15:50

## 2018-10-26 RX ADMIN — SODIUM CHLORIDE, POTASSIUM CHLORIDE, SODIUM LACTATE AND CALCIUM CHLORIDE: 600; 310; 30; 20 INJECTION, SOLUTION INTRAVENOUS at 14:47

## 2018-10-26 RX ADMIN — IPRATROPIUM BROMIDE AND ALBUTEROL SULFATE 3 ML: .5; 3 SOLUTION RESPIRATORY (INHALATION) at 15:24

## 2018-10-26 RX ADMIN — ONDANSETRON 4 MG: 2 INJECTION INTRAMUSCULAR; INTRAVENOUS at 05:58

## 2018-10-26 NOTE — ANESTHESIA PREPROCEDURE EVALUATION
Anesthesia Evaluation     Patient summary reviewed and Nursing notes reviewed   history of anesthetic complications: PONV  NPO Solid Status: > 8 hours  NPO Liquid Status: > 2 hours           Airway   Mallampati: III  TM distance: <3 FB  Neck ROM: limited  Possible difficult intubation  Dental    (+) partials    Pulmonary    (+) pneumonia , sleep apnea (non compliant) on CPAP, rales,     ROS comment: XR 10/25  The heart is normal in size. There is an infiltrate in the lingula which  partially secures the left cardiac border suggesting pneumonia. Lungs  are otherwise clear suffer some atelectasis at the right lung base.  There are no pleural effusions. There are postoperative changes of prior  fusion in the visualized lower cervical spine  Cardiovascular - normal exam    ECG reviewed  Rhythm: regular  Rate: normal    (+) hypertension, PVD, DVT, hyperlipidemia,     ROS comment: Sinus rhythm  Right bundle branch block  Baseline wander in lead(s) II,III,aVL,aVF    Neuro/Psych  (+) TIA, numbness (bells palsy, periferal neuopathy, carpel tunnel), psychiatric history Anxiety and Depression,     (-) CVA  GI/Hepatic/Renal/Endo    (+) obesity,  GERD poorly controlled, GI bleeding, renal disease (CKD stage 3) CRI, diabetes mellitus type 2 using insulin, hypothyroidism,     ROS Comment: Diabetic gastroparesis    Musculoskeletal     (+) chronic pain (CBP),   Abdominal   (+) obese,    Substance History - negative use     OB/GYN          Other   (+) blood dyscrasia (anemia  7.9/24.6), arthritis   history of cancer (skin)                  Anesthesia Plan    ASA 3     MAC     intravenous induction   Anesthetic plan, all risks, benefits, and alternatives have been provided, discussed and informed consent has been obtained with: patient.

## 2018-10-26 NOTE — ANESTHESIA POSTPROCEDURE EVALUATION
Patient: Joya Singh    Procedure Summary     Date:  10/26/18 Room / Location:  formerly [facility] ENDOSCOPY 1 /  [facility]    Anesthesia Start:  1543 Anesthesia Stop:  1600    Procedure:  ESOPHAGOGASTRODUODENOSCOPY (N/A Esophagus) Diagnosis:       Gastrointestinal hemorrhage, unspecified gastrointestinal hemorrhage type      Reflux esophagitis      (Gastrointestinal hemorrhage, unspecified gastrointestinal hemorrhage type [K92.2])    Surgeon:  Emmanuel Rogers MD Provider:  Nigel Liang CRNA    Anesthesia Type:  MAC ASA Status:  3          Anesthesia Type: MAC  Last vitals  BP   154/93 (10/26/18 1605)   Temp   98 °F (36.7 °C) (10/26/18 1605)   Pulse   90 (10/26/18 1605)   Resp   15 (10/26/18 1605)     SpO2   93 % (10/26/18 1605)     Post Anesthesia Care and Evaluation    Patient location during evaluation: PHASE II  Patient participation: complete - patient participated  Level of consciousness: awake and alert  Pain score: 0  Pain management: satisfactory to patient  Airway patency: patent  Anesthetic complications: No anesthetic complications  PONV Status: none  Cardiovascular status: acceptable  Respiratory status: acceptable  Hydration status: acceptable

## 2018-10-27 ENCOUNTER — APPOINTMENT (OUTPATIENT)
Dept: GENERAL RADIOLOGY | Facility: HOSPITAL | Age: 78
End: 2018-10-27

## 2018-10-27 LAB
ALBUMIN SERPL-MCNC: 3.6 G/DL (ref 3.5–5.2)
ALBUMIN/GLOB SERPL: 1.3 G/DL
ALP SERPL-CCNC: 109 U/L (ref 40–129)
ALT SERPL W P-5'-P-CCNC: 11 U/L (ref 5–33)
ANION GAP SERPL CALCULATED.3IONS-SCNC: 10.9 MMOL/L
ANISOCYTOSIS BLD QL: NORMAL
AST SERPL-CCNC: 11 U/L (ref 5–32)
BACTERIA SPEC AEROBE CULT: ABNORMAL
BASOPHILS # BLD AUTO: 0.12 10*3/MM3 (ref 0–0.2)
BASOPHILS NFR BLD AUTO: 2.1 % (ref 0–2)
BILIRUB SERPL-MCNC: 0.7 MG/DL (ref 0.2–1.2)
BUN BLD-MCNC: 12 MG/DL (ref 8–23)
BUN/CREAT SERPL: 7 (ref 7–25)
C DIFF GDH STL QL: NEGATIVE
CALCIUM SPEC-SCNC: 8.5 MG/DL (ref 8.8–10.5)
CHLORIDE SERPL-SCNC: 104 MMOL/L (ref 98–107)
CO2 SERPL-SCNC: 25.1 MMOL/L (ref 22–29)
CREAT BLD-MCNC: 1.71 MG/DL (ref 0.57–1)
DEPRECATED RDW RBC AUTO: 70.2 FL (ref 37–54)
EOSINOPHIL # BLD AUTO: 0.57 10*3/MM3 (ref 0.1–0.3)
EOSINOPHIL NFR BLD AUTO: 9.9 % (ref 0–4)
ERYTHROCYTE [DISTWIDTH] IN BLOOD BY AUTOMATED COUNT: 19.4 % (ref 11.5–14.5)
GFR SERPL CREATININE-BSD FRML MDRD: 29 ML/MIN/1.73
GLOBULIN UR ELPH-MCNC: 2.8 GM/DL
GLUCOSE BLD-MCNC: 200 MG/DL (ref 65–99)
GLUCOSE BLDC GLUCOMTR-MCNC: 134 MG/DL (ref 70–130)
GLUCOSE BLDC GLUCOMTR-MCNC: 186 MG/DL (ref 70–130)
GLUCOSE BLDC GLUCOMTR-MCNC: 191 MG/DL (ref 70–130)
GLUCOSE BLDC GLUCOMTR-MCNC: 202 MG/DL (ref 70–130)
HCT VFR BLD AUTO: 24.4 % (ref 37–47)
HGB BLD-MCNC: 7.9 G/DL (ref 12–16)
IMM GRANULOCYTES # BLD: 0.07 10*3/MM3 (ref 0–0.03)
IMM GRANULOCYTES NFR BLD: 1.2 % (ref 0–0.5)
INR PPP: 1.15 (ref 0.9–1.1)
LYMPHOCYTES # BLD AUTO: 1.88 10*3/MM3 (ref 0.6–4.8)
LYMPHOCYTES NFR BLD AUTO: 32.8 % (ref 20–45)
MCH RBC QN AUTO: 32.8 PG (ref 27–31)
MCHC RBC AUTO-ENTMCNC: 32.4 G/DL (ref 31–37)
MCV RBC AUTO: 101.2 FL (ref 81–99)
MONOCYTES # BLD AUTO: 0.31 10*3/MM3 (ref 0–1)
MONOCYTES NFR BLD AUTO: 5.4 % (ref 3–8)
NEUTROPHILS # BLD AUTO: 2.78 10*3/MM3 (ref 1.5–8.3)
NEUTROPHILS NFR BLD AUTO: 48.6 % (ref 45–70)
NRBC BLD MANUAL-RTO: 0.3 /100 WBC (ref 0–0)
PLAT MORPH BLD: NORMAL
PLATELET # BLD AUTO: 374 10*3/MM3 (ref 140–500)
PMV BLD AUTO: 11.6 FL (ref 7.4–10.4)
POTASSIUM BLD-SCNC: 4.1 MMOL/L (ref 3.5–5.2)
PROT SERPL-MCNC: 6.4 G/DL (ref 6–8.5)
PROTHROMBIN TIME: 14.8 SECONDS (ref 12.1–15)
RBC # BLD AUTO: 2.41 10*6/MM3 (ref 4.2–5.4)
RBC MORPH BLD: NORMAL
SODIUM BLD-SCNC: 140 MMOL/L (ref 136–145)
WBC MORPH BLD: NORMAL
WBC NRBC COR # BLD: 5.73 10*3/MM3 (ref 4.8–10.8)

## 2018-10-27 PROCEDURE — 25010000002 CEFTRIAXONE SODIUM-DEXTROSE 1-3.74 GM-%(50ML) RECONSTITUTED SOLUTION: Performed by: FAMILY MEDICINE

## 2018-10-27 PROCEDURE — 82962 GLUCOSE BLOOD TEST: CPT

## 2018-10-27 PROCEDURE — 99232 SBSQ HOSP IP/OBS MODERATE 35: CPT | Performed by: INTERNAL MEDICINE

## 2018-10-27 PROCEDURE — 63710000001 INSULIN ASPART PER 5 UNITS: Performed by: HOSPITALIST

## 2018-10-27 PROCEDURE — 25010000002 ONDANSETRON PER 1 MG: Performed by: HOSPITALIST

## 2018-10-27 PROCEDURE — 94799 UNLISTED PULMONARY SVC/PX: CPT

## 2018-10-27 PROCEDURE — 99233 SBSQ HOSP IP/OBS HIGH 50: CPT | Performed by: FAMILY MEDICINE

## 2018-10-27 PROCEDURE — 87324 CLOSTRIDIUM AG IA: CPT | Performed by: NURSE PRACTITIONER

## 2018-10-27 PROCEDURE — 87449 NOS EACH ORGANISM AG IA: CPT | Performed by: NURSE PRACTITIONER

## 2018-10-27 PROCEDURE — 85007 BL SMEAR W/DIFF WBC COUNT: CPT | Performed by: HOSPITALIST

## 2018-10-27 PROCEDURE — 25010000002 AZITHROMYCIN: Performed by: FAMILY MEDICINE

## 2018-10-27 PROCEDURE — 85025 COMPLETE CBC W/AUTO DIFF WBC: CPT | Performed by: HOSPITALIST

## 2018-10-27 PROCEDURE — 85610 PROTHROMBIN TIME: CPT | Performed by: HOSPITALIST

## 2018-10-27 PROCEDURE — 71046 X-RAY EXAM CHEST 2 VIEWS: CPT

## 2018-10-27 PROCEDURE — 87507 IADNA-DNA/RNA PROBE TQ 12-25: CPT | Performed by: NURSE PRACTITIONER

## 2018-10-27 PROCEDURE — 25010000002 HYDROMORPHONE 1 MG/ML SOLUTION: Performed by: HOSPITALIST

## 2018-10-27 PROCEDURE — 25010000002 PROMETHAZINE PER 50 MG: Performed by: HOSPITALIST

## 2018-10-27 PROCEDURE — 63710000001 DIPHENHYDRAMINE PER 50 MG: Performed by: FAMILY MEDICINE

## 2018-10-27 PROCEDURE — 80053 COMPREHEN METABOLIC PANEL: CPT | Performed by: NURSE PRACTITIONER

## 2018-10-27 RX ORDER — ONDANSETRON 2 MG/ML
4 INJECTION INTRAMUSCULAR; INTRAVENOUS ONCE AS NEEDED
Status: DISCONTINUED | OUTPATIENT
Start: 2018-10-27 | End: 2018-10-28 | Stop reason: HOSPADM

## 2018-10-27 RX ORDER — POLYETHYLENE GLYCOL 3350 17 G/17G
119 POWDER, FOR SOLUTION ORAL EVERY 6 HOURS
Status: DISCONTINUED | OUTPATIENT
Start: 2018-10-27 | End: 2018-10-27

## 2018-10-27 RX ORDER — HYDRALAZINE HYDROCHLORIDE 20 MG/ML
10 INJECTION INTRAMUSCULAR; INTRAVENOUS EVERY 6 HOURS PRN
Status: DISCONTINUED | OUTPATIENT
Start: 2018-10-27 | End: 2018-10-30 | Stop reason: HOSPADM

## 2018-10-27 RX ORDER — SODIUM CHLORIDE, SODIUM LACTATE, POTASSIUM CHLORIDE, CALCIUM CHLORIDE 600; 310; 30; 20 MG/100ML; MG/100ML; MG/100ML; MG/100ML
100 INJECTION, SOLUTION INTRAVENOUS CONTINUOUS
Status: DISCONTINUED | OUTPATIENT
Start: 2018-10-27 | End: 2018-10-27 | Stop reason: ALTCHOICE

## 2018-10-27 RX ORDER — SODIUM CHLORIDE, SODIUM LACTATE, POTASSIUM CHLORIDE, CALCIUM CHLORIDE 600; 310; 30; 20 MG/100ML; MG/100ML; MG/100ML; MG/100ML
100 INJECTION, SOLUTION INTRAVENOUS CONTINUOUS
Status: DISCONTINUED | OUTPATIENT
Start: 2018-10-27 | End: 2018-10-28

## 2018-10-27 RX ORDER — POLYETHYLENE GLYCOL 3350 17 G/17G
119 POWDER, FOR SOLUTION ORAL EVERY 6 HOURS
Status: COMPLETED | OUTPATIENT
Start: 2018-10-27 | End: 2018-10-27

## 2018-10-27 RX ORDER — PROMETHAZINE HYDROCHLORIDE 25 MG/ML
INJECTION, SOLUTION INTRAMUSCULAR; INTRAVENOUS
Status: DISPENSED
Start: 2018-10-27 | End: 2018-10-27

## 2018-10-27 RX ORDER — CEFTRIAXONE 1 G/50ML
1 INJECTION, SOLUTION INTRAVENOUS EVERY 24 HOURS
Status: DISCONTINUED | OUTPATIENT
Start: 2018-10-27 | End: 2018-10-30

## 2018-10-27 RX ORDER — MEPERIDINE HYDROCHLORIDE 25 MG/ML
12.5 INJECTION INTRAMUSCULAR; INTRAVENOUS; SUBCUTANEOUS
Status: DISCONTINUED | OUTPATIENT
Start: 2018-10-27 | End: 2018-10-27

## 2018-10-27 RX ORDER — DIPHENHYDRAMINE HCL 25 MG
25 CAPSULE ORAL EVERY 6 HOURS PRN
Status: DISCONTINUED | OUTPATIENT
Start: 2018-10-27 | End: 2018-10-30 | Stop reason: HOSPADM

## 2018-10-27 RX ADMIN — SODIUM CHLORIDE 100 ML/HR: 9 INJECTION, SOLUTION INTRAVENOUS at 02:58

## 2018-10-27 RX ADMIN — Medication 3 ML: at 08:26

## 2018-10-27 RX ADMIN — INSULIN ASPART 2 UNITS: 100 INJECTION, SOLUTION INTRAVENOUS; SUBCUTANEOUS at 11:57

## 2018-10-27 RX ADMIN — ONDANSETRON 4 MG: 2 INJECTION INTRAMUSCULAR; INTRAVENOUS at 00:33

## 2018-10-27 RX ADMIN — DULOXETINE HYDROCHLORIDE 30 MG: 30 CAPSULE, DELAYED RELEASE ORAL at 08:27

## 2018-10-27 RX ADMIN — MAGESIUM CITRATE 300 ML: 1.75 LIQUID ORAL at 08:30

## 2018-10-27 RX ADMIN — SODIUM CHLORIDE 8 MG/HR: 900 INJECTION INTRAVENOUS at 19:00

## 2018-10-27 RX ADMIN — DIPHENHYDRAMINE HYDROCHLORIDE 25 MG: 25 CAPSULE ORAL at 14:15

## 2018-10-27 RX ADMIN — POLYETHYLENE GLYCOL 3350 119 G: 17 POWDER, FOR SOLUTION ORAL at 14:06

## 2018-10-27 RX ADMIN — MIDODRINE HYDROCHLORIDE 2.5 MG: 5 TABLET ORAL at 08:27

## 2018-10-27 RX ADMIN — NYSTATIN: 100000 POWDER TOPICAL at 20:18

## 2018-10-27 RX ADMIN — MIDODRINE HYDROCHLORIDE 2.5 MG: 5 TABLET ORAL at 20:15

## 2018-10-27 RX ADMIN — CEFTRIAXONE 1 G: 1 INJECTION, SOLUTION INTRAVENOUS at 16:39

## 2018-10-27 RX ADMIN — INSULIN ASPART 2 UNITS: 100 INJECTION, SOLUTION INTRAVENOUS; SUBCUTANEOUS at 16:37

## 2018-10-27 RX ADMIN — HYDROMORPHONE HYDROCHLORIDE 1 MG: 1 INJECTION, SOLUTION INTRAMUSCULAR; INTRAVENOUS; SUBCUTANEOUS at 03:58

## 2018-10-27 RX ADMIN — POLYETHYLENE GLYCOL (3350) 119 G: 17 POWDER, FOR SOLUTION ORAL at 20:16

## 2018-10-27 RX ADMIN — NYSTATIN: 100000 POWDER TOPICAL at 08:26

## 2018-10-27 RX ADMIN — SODIUM CHLORIDE, POTASSIUM CHLORIDE, SODIUM LACTATE AND CALCIUM CHLORIDE 100 ML/HR: 600; 310; 30; 20 INJECTION, SOLUTION INTRAVENOUS at 10:58

## 2018-10-27 RX ADMIN — POLYETHYLENE GLYCOL 3350 119 G: 17 POWDER, FOR SOLUTION ORAL at 20:16

## 2018-10-27 RX ADMIN — AZITHROMYCIN MONOHYDRATE 500 MG: 500 INJECTION, POWDER, LYOPHILIZED, FOR SOLUTION INTRAVENOUS at 17:33

## 2018-10-27 RX ADMIN — PROMETHAZINE HYDROCHLORIDE 12.5 MG: 25 INJECTION INTRAMUSCULAR; INTRAVENOUS at 02:13

## 2018-10-27 RX ADMIN — INSULIN ASPART 4 UNITS: 100 INJECTION, SOLUTION INTRAVENOUS; SUBCUTANEOUS at 08:24

## 2018-10-27 RX ADMIN — LEVOTHYROXINE SODIUM 88 MCG: 0.09 TABLET ORAL at 06:15

## 2018-10-28 ENCOUNTER — ANESTHESIA (OUTPATIENT)
Dept: PERIOP | Facility: HOSPITAL | Age: 78
End: 2018-10-28

## 2018-10-28 ENCOUNTER — ANESTHESIA EVENT (OUTPATIENT)
Dept: PERIOP | Facility: HOSPITAL | Age: 78
End: 2018-10-28

## 2018-10-28 LAB
ADV 40+41 DNA STL QL NAA+NON-PROBE: NOT DETECTED
ANION GAP SERPL CALCULATED.3IONS-SCNC: 10.6 MMOL/L
ASTRO TYP 1-8 RNA STL QL NAA+NON-PROBE: NOT DETECTED
BASOPHILS # BLD AUTO: 0.08 10*3/MM3 (ref 0–0.2)
BASOPHILS NFR BLD AUTO: 1.5 % (ref 0–2)
BUN BLD-MCNC: 9 MG/DL (ref 8–23)
BUN/CREAT SERPL: 5.6 (ref 7–25)
C CAYETANENSIS DNA STL QL NAA+NON-PROBE: NOT DETECTED
CALCIUM SPEC-SCNC: 8.3 MG/DL (ref 8.8–10.5)
CAMPY SP DNA.DIARRHEA STL QL NAA+PROBE: NOT DETECTED
CHLORIDE SERPL-SCNC: 105 MMOL/L (ref 98–107)
CO2 SERPL-SCNC: 25.4 MMOL/L (ref 22–29)
CREAT BLD-MCNC: 1.62 MG/DL (ref 0.57–1)
CRYPTOSP STL CULT: NOT DETECTED
DEPRECATED RDW RBC AUTO: 71.2 FL (ref 37–54)
E COLI DNA SPEC QL NAA+PROBE: NOT DETECTED
E HISTOLYT AG STL-ACNC: NOT DETECTED
EAEC PAA PLAS AGGR+AATA ST NAA+NON-PRB: NOT DETECTED
EC STX1 + STX2 GENES STL NAA+PROBE: NOT DETECTED
EOSINOPHIL # BLD AUTO: 0.64 10*3/MM3 (ref 0.1–0.3)
EOSINOPHIL NFR BLD AUTO: 11.9 % (ref 0–4)
EPEC EAE GENE STL QL NAA+NON-PROBE: NOT DETECTED
ERYTHROCYTE [DISTWIDTH] IN BLOOD BY AUTOMATED COUNT: 19.6 % (ref 11.5–14.5)
ETEC LTA+ST1A+ST1B TOX ST NAA+NON-PROBE: NOT DETECTED
G LAMBLIA DNA SPEC QL NAA+PROBE: NOT DETECTED
GFR SERPL CREATININE-BSD FRML MDRD: 31 ML/MIN/1.73
GLUCOSE BLD-MCNC: 179 MG/DL (ref 65–99)
GLUCOSE BLDC GLUCOMTR-MCNC: 169 MG/DL (ref 70–130)
GLUCOSE BLDC GLUCOMTR-MCNC: 186 MG/DL (ref 70–130)
GLUCOSE BLDC GLUCOMTR-MCNC: 192 MG/DL (ref 70–130)
GLUCOSE BLDC GLUCOMTR-MCNC: 209 MG/DL (ref 70–130)
GLUCOSE BLDC GLUCOMTR-MCNC: 220 MG/DL (ref 70–130)
HCT VFR BLD AUTO: 22.7 % (ref 37–47)
HGB BLD-MCNC: 7.1 G/DL (ref 12–16)
IMM GRANULOCYTES # BLD: 0.02 10*3/MM3 (ref 0–0.03)
IMM GRANULOCYTES NFR BLD: 0.4 % (ref 0–0.5)
INR PPP: 1.21 (ref 0.9–1.1)
LYMPHOCYTES # BLD AUTO: 1.78 10*3/MM3 (ref 0.6–4.8)
LYMPHOCYTES NFR BLD AUTO: 33.2 % (ref 20–45)
MCH RBC QN AUTO: 32.1 PG (ref 27–31)
MCHC RBC AUTO-ENTMCNC: 31.3 G/DL (ref 31–37)
MCV RBC AUTO: 102.7 FL (ref 81–99)
MONOCYTES # BLD AUTO: 0.32 10*3/MM3 (ref 0–1)
MONOCYTES NFR BLD AUTO: 6 % (ref 3–8)
NEUTROPHILS # BLD AUTO: 2.52 10*3/MM3 (ref 1.5–8.3)
NEUTROPHILS NFR BLD AUTO: 47 % (ref 45–70)
NOROVIRUS GI+II RNA STL QL NAA+NON-PROBE: NOT DETECTED
NRBC BLD MANUAL-RTO: 0 /100 WBC (ref 0–0)
P SHIGELLOIDES DNA STL QL NAA+NON-PROBE: NOT DETECTED
PLATELET # BLD AUTO: 392 10*3/MM3 (ref 140–500)
PMV BLD AUTO: 11.4 FL (ref 7.4–10.4)
POTASSIUM BLD-SCNC: 4 MMOL/L (ref 3.5–5.2)
PROTHROMBIN TIME: 15.4 SECONDS (ref 12.1–15)
RBC # BLD AUTO: 2.21 10*6/MM3 (ref 4.2–5.4)
RV RNA STL NAA+PROBE: NOT DETECTED
SALMONELLA DNA SPEC QL NAA+PROBE: NOT DETECTED
SAPO I+II+IV+V RNA STL QL NAA+NON-PROBE: NOT DETECTED
SHIGELLA SP+EIEC IPAH STL QL NAA+PROBE: NOT DETECTED
SODIUM BLD-SCNC: 141 MMOL/L (ref 136–145)
V CHOLERAE DNA SPEC QL NAA+PROBE: NOT DETECTED
VIBRIO DNA SPEC NAA+PROBE: NOT DETECTED
WBC NRBC COR # BLD: 5.36 10*3/MM3 (ref 4.8–10.8)
YERSINIA STL CULT: NOT DETECTED

## 2018-10-28 PROCEDURE — 80048 BASIC METABOLIC PNL TOTAL CA: CPT | Performed by: HOSPITALIST

## 2018-10-28 PROCEDURE — 86900 BLOOD TYPING SEROLOGIC ABO: CPT

## 2018-10-28 PROCEDURE — 45378 DIAGNOSTIC COLONOSCOPY: CPT | Performed by: INTERNAL MEDICINE

## 2018-10-28 PROCEDURE — 25010000002 CEFTRIAXONE SODIUM-DEXTROSE 1-3.74 GM-%(50ML) RECONSTITUTED SOLUTION: Performed by: FAMILY MEDICINE

## 2018-10-28 PROCEDURE — 99233 SBSQ HOSP IP/OBS HIGH 50: CPT | Performed by: FAMILY MEDICINE

## 2018-10-28 PROCEDURE — 82962 GLUCOSE BLOOD TEST: CPT

## 2018-10-28 PROCEDURE — 85025 COMPLETE CBC W/AUTO DIFF WBC: CPT | Performed by: HOSPITALIST

## 2018-10-28 PROCEDURE — 94799 UNLISTED PULMONARY SVC/PX: CPT

## 2018-10-28 PROCEDURE — 25010000002 AZITHROMYCIN: Performed by: FAMILY MEDICINE

## 2018-10-28 PROCEDURE — P9016 RBC LEUKOCYTES REDUCED: HCPCS

## 2018-10-28 PROCEDURE — 0DJD8ZZ INSPECTION OF LOWER INTESTINAL TRACT, VIA NATURAL OR ARTIFICIAL OPENING ENDOSCOPIC: ICD-10-PCS | Performed by: INTERNAL MEDICINE

## 2018-10-28 PROCEDURE — 36430 TRANSFUSION BLD/BLD COMPNT: CPT

## 2018-10-28 PROCEDURE — 25010000002 PROPOFOL 10 MG/ML EMULSION: Performed by: ANESTHESIOLOGY

## 2018-10-28 PROCEDURE — 85610 PROTHROMBIN TIME: CPT | Performed by: HOSPITALIST

## 2018-10-28 PROCEDURE — 63710000001 INSULIN ASPART PER 5 UNITS: Performed by: HOSPITALIST

## 2018-10-28 RX ORDER — SODIUM CHLORIDE 9 MG/ML
INJECTION, SOLUTION INTRAVENOUS
Status: COMPLETED
Start: 2018-10-28 | End: 2018-10-28

## 2018-10-28 RX ORDER — LIDOCAINE HYDROCHLORIDE 20 MG/ML
INJECTION, SOLUTION INFILTRATION; PERINEURAL AS NEEDED
Status: DISCONTINUED | OUTPATIENT
Start: 2018-10-28 | End: 2018-10-28 | Stop reason: SURG

## 2018-10-28 RX ORDER — PROPOFOL 10 MG/ML
VIAL (ML) INTRAVENOUS AS NEEDED
Status: DISCONTINUED | OUTPATIENT
Start: 2018-10-28 | End: 2018-10-28 | Stop reason: SURG

## 2018-10-28 RX ORDER — SODIUM CHLORIDE, SODIUM LACTATE, POTASSIUM CHLORIDE, CALCIUM CHLORIDE 600; 310; 30; 20 MG/100ML; MG/100ML; MG/100ML; MG/100ML
9 INJECTION, SOLUTION INTRAVENOUS CONTINUOUS
Status: DISCONTINUED | OUTPATIENT
Start: 2018-10-28 | End: 2018-10-28

## 2018-10-28 RX ADMIN — SODIUM CHLORIDE 100 ML/HR: 900 INJECTION, SOLUTION INTRAVENOUS at 11:53

## 2018-10-28 RX ADMIN — INSULIN ASPART 2 UNITS: 100 INJECTION, SOLUTION INTRAVENOUS; SUBCUTANEOUS at 12:34

## 2018-10-28 RX ADMIN — PROPOFOL 380 MG: 10 INJECTION, EMULSION INTRAVENOUS at 08:16

## 2018-10-28 RX ADMIN — SODIUM CHLORIDE, POTASSIUM CHLORIDE, SODIUM LACTATE AND CALCIUM CHLORIDE 100 ML/HR: 600; 310; 30; 20 INJECTION, SOLUTION INTRAVENOUS at 00:18

## 2018-10-28 RX ADMIN — INSULIN ASPART 4 UNITS: 100 INJECTION, SOLUTION INTRAVENOUS; SUBCUTANEOUS at 21:21

## 2018-10-28 RX ADMIN — SODIUM CHLORIDE, POTASSIUM CHLORIDE, SODIUM LACTATE AND CALCIUM CHLORIDE 9 ML/HR: 600; 310; 30; 20 INJECTION, SOLUTION INTRAVENOUS at 07:45

## 2018-10-28 RX ADMIN — ACETAMINOPHEN 650 MG: 325 TABLET, FILM COATED ORAL at 21:32

## 2018-10-28 RX ADMIN — MIDODRINE HYDROCHLORIDE 2.5 MG: 5 TABLET ORAL at 21:24

## 2018-10-28 RX ADMIN — Medication 3 ML: at 21:23

## 2018-10-28 RX ADMIN — CEFTRIAXONE 1 G: 1 INJECTION, SOLUTION INTRAVENOUS at 17:56

## 2018-10-28 RX ADMIN — SODIUM CHLORIDE 100 ML/HR: 9 INJECTION, SOLUTION INTRAVENOUS at 11:53

## 2018-10-28 RX ADMIN — AZITHROMYCIN MONOHYDRATE 500 MG: 500 INJECTION, POWDER, LYOPHILIZED, FOR SOLUTION INTRAVENOUS at 18:38

## 2018-10-28 RX ADMIN — LEVOTHYROXINE SODIUM 88 MCG: 0.09 TABLET ORAL at 10:05

## 2018-10-28 RX ADMIN — INSULIN ASPART 4 UNITS: 100 INJECTION, SOLUTION INTRAVENOUS; SUBCUTANEOUS at 17:00

## 2018-10-28 RX ADMIN — LIDOCAINE HYDROCHLORIDE 80 MG: 20 INJECTION, SOLUTION INFILTRATION; PERINEURAL at 08:16

## 2018-10-28 RX ADMIN — SODIUM CHLORIDE 8 MG/HR: 900 INJECTION INTRAVENOUS at 00:02

## 2018-10-28 RX ADMIN — NYSTATIN: 100000 POWDER TOPICAL at 21:22

## 2018-10-28 RX ADMIN — INSULIN ASPART 2 UNITS: 100 INJECTION, SOLUTION INTRAVENOUS; SUBCUTANEOUS at 10:25

## 2018-10-28 NOTE — ANESTHESIA PREPROCEDURE EVALUATION
Anesthesia Evaluation     Patient summary reviewed and Nursing notes reviewed   history of anesthetic complications: PONV  NPO Solid Status: > 8 hours  NPO Liquid Status: > 2 hours           Airway   Mallampati: III  TM distance: <3 FB  Neck ROM: limited  Possible difficult intubation  Dental    (+) partials    Pulmonary    (+) pneumonia resolved , sleep apnea (non compliant) on CPAP, rales,   (-) not a smoker    ROS comment: XR 10/25  The heart is normal in size. There is an infiltrate in the lingula which  partially secures the left cardiac border suggesting pneumonia. Lungs  are otherwise clear suffer some atelectasis at the right lung base.  There are no pleural effusions. There are postoperative changes of prior  fusion in the visualized lower cervical spine  Cardiovascular - normal exam    ECG reviewed  Rhythm: regular  Rate: normal    (+) hypertension, PVD, DVT resolved, hyperlipidemia,     ROS comment: Sinus rhythm  Right bundle branch block  Baseline wander in lead(s) II,III,aVL,aVF    Neuro/Psych  (+) TIA, numbness (bells palsy, periferal neuopathy, carpel tunnel), psychiatric history Anxiety and Depression,     (-) CVA  GI/Hepatic/Renal/Endo    (+) obesity,  GERD poorly controlled, GI bleeding, renal disease (CKD stage 3) CRI, diabetes mellitus () type 2 poorly controlled using insulin, hypothyroidism,     ROS Comment: Diabetic gastroparesis    Musculoskeletal     (+) chronic pain (CBP),   Abdominal   (+) obese,    Substance History - negative use     OB/GYN          Other   (+) blood dyscrasia (anemia  7.9/24.6), arthritis   history of cancer (skin)                      Anesthesia Plan    ASA 3     MAC     intravenous induction   Anesthetic plan, all risks, benefits, and alternatives have been provided, discussed and informed consent has been obtained with: patient.

## 2018-10-28 NOTE — ANESTHESIA POSTPROCEDURE EVALUATION
Patient: Joya Singh    Procedure Summary     Date:  10/28/18 Room / Location:  [facility] ENDOSCOPY 1 /  [facility]    Anesthesia Start:  0810 Anesthesia Stop:  0847    Procedure:  COLONOSCOPY (N/A ) Diagnosis:       Gastrointestinal hemorrhage, unspecified gastrointestinal hemorrhage type      Melena      Anemia      (Gastrointestinal hemorrhage, unspecified gastrointestinal hemorrhage type [K92.2])    Surgeon:  Emmanuel Rogers MD Provider:  Lou Dobson MD    Anesthesia Type:  MAC ASA Status:  3          Anesthesia Type: MAC  Last vitals  BP   152/62 (10/28/18 1055)   Temp   98.3 °F (36.8 °C) (10/28/18 1055)   Pulse   87 (10/28/18 1055)   Resp   16 (10/28/18 1055)     SpO2   92 % (10/28/18 1055)     Post Anesthesia Care and Evaluation    Patient location during evaluation: PACU  Patient participation: complete - patient participated  Level of consciousness: awake and alert  Pain score: 0  Pain management: satisfactory to patient  Airway patency: patent  Anesthetic complications: No anesthetic complications  PONV Status: none  Cardiovascular status: acceptable  Respiratory status: acceptable  Hydration status: acceptable    Comments: Patient evaluated prior to discharge from PACU.

## 2018-10-29 LAB
ABO + RH BLD: NORMAL
ABO + RH BLD: NORMAL
ANION GAP SERPL CALCULATED.3IONS-SCNC: 9.1 MMOL/L
ANISOCYTOSIS BLD QL: NORMAL
BASOPHILS # BLD AUTO: 0.1 10*3/MM3 (ref 0–0.2)
BASOPHILS NFR BLD AUTO: 1.8 % (ref 0–2)
BH BB BLOOD EXPIRATION DATE: NORMAL
BH BB BLOOD EXPIRATION DATE: NORMAL
BH BB BLOOD TYPE BARCODE: 5100
BH BB BLOOD TYPE BARCODE: 5100
BH BB DISPENSE STATUS: NORMAL
BH BB DISPENSE STATUS: NORMAL
BH BB PRODUCT CODE: NORMAL
BH BB PRODUCT CODE: NORMAL
BH BB UNIT NUMBER: NORMAL
BH BB UNIT NUMBER: NORMAL
BUN BLD-MCNC: 10 MG/DL (ref 8–23)
BUN/CREAT SERPL: 6.1 (ref 7–25)
CALCIUM SPEC-SCNC: 8.5 MG/DL (ref 8.8–10.5)
CHLORIDE SERPL-SCNC: 107 MMOL/L (ref 98–107)
CO2 SERPL-SCNC: 25.9 MMOL/L (ref 22–29)
CREAT BLD-MCNC: 1.63 MG/DL (ref 0.57–1)
DEPRECATED RDW RBC AUTO: 65.1 FL (ref 37–54)
DIMORPHIC RBC: PRESENT
EOSINOPHIL # BLD AUTO: 0.59 10*3/MM3 (ref 0.1–0.3)
EOSINOPHIL NFR BLD AUTO: 10.9 % (ref 0–4)
ERYTHROCYTE [DISTWIDTH] IN BLOOD BY AUTOMATED COUNT: 19.5 % (ref 11.5–14.5)
GFR SERPL CREATININE-BSD FRML MDRD: 31 ML/MIN/1.73
GLUCOSE BLD-MCNC: 169 MG/DL (ref 65–99)
GLUCOSE BLDC GLUCOMTR-MCNC: 182 MG/DL (ref 70–130)
GLUCOSE BLDC GLUCOMTR-MCNC: 182 MG/DL (ref 70–130)
GLUCOSE BLDC GLUCOMTR-MCNC: 188 MG/DL (ref 70–130)
GLUCOSE BLDC GLUCOMTR-MCNC: 201 MG/DL (ref 70–130)
HCT VFR BLD AUTO: 26.8 % (ref 37–47)
HGB BLD-MCNC: 8.8 G/DL (ref 12–16)
IMM GRANULOCYTES # BLD: 0.04 10*3/MM3 (ref 0–0.03)
IMM GRANULOCYTES NFR BLD: 0.7 % (ref 0–0.5)
INR PPP: 1.15 (ref 0.9–1.1)
LYMPHOCYTES # BLD AUTO: 1.85 10*3/MM3 (ref 0.6–4.8)
LYMPHOCYTES NFR BLD AUTO: 34.1 % (ref 20–45)
MCH RBC QN AUTO: 31.9 PG (ref 27–31)
MCHC RBC AUTO-ENTMCNC: 32.8 G/DL (ref 31–37)
MCV RBC AUTO: 97.1 FL (ref 81–99)
MONOCYTES # BLD AUTO: 0.42 10*3/MM3 (ref 0–1)
MONOCYTES NFR BLD AUTO: 7.7 % (ref 3–8)
NEUTROPHILS # BLD AUTO: 2.42 10*3/MM3 (ref 1.5–8.3)
NEUTROPHILS NFR BLD AUTO: 44.8 % (ref 45–70)
NRBC BLD MANUAL-RTO: 0 /100 WBC (ref 0–0)
PLAT MORPH BLD: NORMAL
PLATELET # BLD AUTO: 309 10*3/MM3 (ref 140–500)
PMV BLD AUTO: 11.2 FL (ref 7.4–10.4)
POTASSIUM BLD-SCNC: 4 MMOL/L (ref 3.5–5.2)
PROCALCITONIN SERPL-MCNC: 0.17 NG/ML (ref 0.1–0.25)
PROTHROMBIN TIME: 14.8 SECONDS (ref 12.1–15)
RBC # BLD AUTO: 2.76 10*6/MM3 (ref 4.2–5.4)
SODIUM BLD-SCNC: 142 MMOL/L (ref 136–145)
UNIT  ABO: NORMAL
UNIT  ABO: NORMAL
UNIT  RH: NORMAL
UNIT  RH: NORMAL
WBC MORPH BLD: NORMAL
WBC NRBC COR # BLD: 5.42 10*3/MM3 (ref 4.8–10.8)

## 2018-10-29 PROCEDURE — 80048 BASIC METABOLIC PNL TOTAL CA: CPT | Performed by: HOSPITALIST

## 2018-10-29 PROCEDURE — 63710000001 INSULIN ASPART PER 5 UNITS: Performed by: HOSPITALIST

## 2018-10-29 PROCEDURE — 85610 PROTHROMBIN TIME: CPT | Performed by: HOSPITALIST

## 2018-10-29 PROCEDURE — 25010000002 PROMETHAZINE PER 50 MG

## 2018-10-29 PROCEDURE — 92610 EVALUATE SWALLOWING FUNCTION: CPT

## 2018-10-29 PROCEDURE — 25010000002 HYDRALAZINE PER 20 MG: Performed by: FAMILY MEDICINE

## 2018-10-29 PROCEDURE — 63710000001 DIPHENHYDRAMINE PER 50 MG: Performed by: FAMILY MEDICINE

## 2018-10-29 PROCEDURE — 25010000002 CEFTRIAXONE SODIUM-DEXTROSE 1-3.74 GM-%(50ML) RECONSTITUTED SOLUTION: Performed by: FAMILY MEDICINE

## 2018-10-29 PROCEDURE — 85007 BL SMEAR W/DIFF WBC COUNT: CPT | Performed by: HOSPITALIST

## 2018-10-29 PROCEDURE — 94799 UNLISTED PULMONARY SVC/PX: CPT

## 2018-10-29 PROCEDURE — 84145 PROCALCITONIN (PCT): CPT | Performed by: NURSE PRACTITIONER

## 2018-10-29 PROCEDURE — 82962 GLUCOSE BLOOD TEST: CPT

## 2018-10-29 PROCEDURE — 99231 SBSQ HOSP IP/OBS SF/LOW 25: CPT | Performed by: INTERNAL MEDICINE

## 2018-10-29 PROCEDURE — 99232 SBSQ HOSP IP/OBS MODERATE 35: CPT | Performed by: NURSE PRACTITIONER

## 2018-10-29 PROCEDURE — 25010000002 PROMETHAZINE PER 50 MG: Performed by: NURSE PRACTITIONER

## 2018-10-29 PROCEDURE — 85025 COMPLETE CBC W/AUTO DIFF WBC: CPT | Performed by: HOSPITALIST

## 2018-10-29 RX ORDER — PROMETHAZINE HYDROCHLORIDE 25 MG/ML
INJECTION, SOLUTION INTRAMUSCULAR; INTRAVENOUS
Status: COMPLETED
Start: 2018-10-29 | End: 2018-10-29

## 2018-10-29 RX ORDER — ATORVASTATIN CALCIUM 10 MG/1
10 TABLET, FILM COATED ORAL NIGHTLY
Status: DISCONTINUED | OUTPATIENT
Start: 2018-10-29 | End: 2018-10-30 | Stop reason: HOSPADM

## 2018-10-29 RX ADMIN — CEFTRIAXONE 1 G: 1 INJECTION, SOLUTION INTRAVENOUS at 15:27

## 2018-10-29 RX ADMIN — Medication 3 ML: at 23:11

## 2018-10-29 RX ADMIN — MIDODRINE HYDROCHLORIDE 2.5 MG: 5 TABLET ORAL at 08:40

## 2018-10-29 RX ADMIN — SODIUM CHLORIDE, PRESERVATIVE FREE 12.5 MG: 5 INJECTION INTRAVENOUS at 20:58

## 2018-10-29 RX ADMIN — ACETAMINOPHEN 650 MG: 325 TABLET, FILM COATED ORAL at 13:51

## 2018-10-29 RX ADMIN — LEVOTHYROXINE SODIUM 88 MCG: 0.09 TABLET ORAL at 06:05

## 2018-10-29 RX ADMIN — INSULIN ASPART 2 UNITS: 100 INJECTION, SOLUTION INTRAVENOUS; SUBCUTANEOUS at 20:59

## 2018-10-29 RX ADMIN — DIPHENHYDRAMINE HYDROCHLORIDE 25 MG: 25 CAPSULE ORAL at 20:57

## 2018-10-29 RX ADMIN — ATORVASTATIN CALCIUM 10 MG: 10 TABLET, FILM COATED ORAL at 20:57

## 2018-10-29 RX ADMIN — DULOXETINE HYDROCHLORIDE 30 MG: 30 CAPSULE, DELAYED RELEASE ORAL at 08:40

## 2018-10-29 RX ADMIN — NYSTATIN: 100000 POWDER TOPICAL at 15:28

## 2018-10-29 RX ADMIN — HYDRALAZINE HYDROCHLORIDE 10 MG: 20 INJECTION INTRAMUSCULAR; INTRAVENOUS at 00:36

## 2018-10-29 RX ADMIN — HYDRALAZINE HYDROCHLORIDE 10 MG: 20 INJECTION INTRAMUSCULAR; INTRAVENOUS at 06:59

## 2018-10-29 RX ADMIN — SODIUM CHLORIDE, PRESERVATIVE FREE 12.5 MG: 5 INJECTION INTRAVENOUS at 15:27

## 2018-10-29 RX ADMIN — INSULIN ASPART 4 UNITS: 100 INJECTION, SOLUTION INTRAVENOUS; SUBCUTANEOUS at 17:16

## 2018-10-29 RX ADMIN — MIDODRINE HYDROCHLORIDE 2.5 MG: 5 TABLET ORAL at 20:57

## 2018-10-29 RX ADMIN — INSULIN ASPART 2 UNITS: 100 INJECTION, SOLUTION INTRAVENOUS; SUBCUTANEOUS at 08:39

## 2018-10-29 RX ADMIN — NYSTATIN: 100000 POWDER TOPICAL at 20:59

## 2018-10-29 RX ADMIN — HYDRALAZINE HYDROCHLORIDE 10 MG: 20 INJECTION INTRAMUSCULAR; INTRAVENOUS at 13:44

## 2018-10-29 RX ADMIN — Medication 3 ML: at 08:43

## 2018-10-29 RX ADMIN — NYSTATIN: 100000 POWDER TOPICAL at 08:42

## 2018-10-29 RX ADMIN — PROMETHAZINE HYDROCHLORIDE 12.5 MG: 25 INJECTION INTRAMUSCULAR; INTRAVENOUS at 11:04

## 2018-10-30 VITALS
RESPIRATION RATE: 18 BRPM | DIASTOLIC BLOOD PRESSURE: 74 MMHG | TEMPERATURE: 98.4 F | HEIGHT: 62 IN | WEIGHT: 198.41 LBS | BODY MASS INDEX: 36.51 KG/M2 | SYSTOLIC BLOOD PRESSURE: 172 MMHG | OXYGEN SATURATION: 91 % | HEART RATE: 88 BPM

## 2018-10-30 LAB
ANION GAP SERPL CALCULATED.3IONS-SCNC: 12.8 MMOL/L
BACTERIA SPEC AEROBE CULT: NORMAL
BACTERIA SPEC AEROBE CULT: NORMAL
BASOPHILS # BLD AUTO: 0.11 10*3/MM3 (ref 0–0.2)
BASOPHILS NFR BLD AUTO: 2.6 % (ref 0–2)
BUN BLD-MCNC: 9 MG/DL (ref 8–23)
BUN/CREAT SERPL: 6 (ref 7–25)
CALCIUM SPEC-SCNC: 8.6 MG/DL (ref 8.8–10.5)
CHLORIDE SERPL-SCNC: 104 MMOL/L (ref 98–107)
CO2 SERPL-SCNC: 23.2 MMOL/L (ref 22–29)
CREAT BLD-MCNC: 1.51 MG/DL (ref 0.57–1)
DEPRECATED RDW RBC AUTO: 65.4 FL (ref 37–54)
EOSINOPHIL # BLD AUTO: 0.55 10*3/MM3 (ref 0.1–0.3)
EOSINOPHIL NFR BLD AUTO: 12.8 % (ref 0–4)
ERYTHROCYTE [DISTWIDTH] IN BLOOD BY AUTOMATED COUNT: 19.4 % (ref 11.5–14.5)
GFR SERPL CREATININE-BSD FRML MDRD: 34 ML/MIN/1.73
GLUCOSE BLD-MCNC: 163 MG/DL (ref 65–99)
GLUCOSE BLDC GLUCOMTR-MCNC: 152 MG/DL (ref 70–130)
GLUCOSE BLDC GLUCOMTR-MCNC: 217 MG/DL (ref 70–130)
HCT VFR BLD AUTO: 27 % (ref 37–47)
HGB BLD-MCNC: 8.9 G/DL (ref 12–16)
IMM GRANULOCYTES # BLD: 0.2 10*3/MM3 (ref 0–0.03)
IMM GRANULOCYTES NFR BLD: 4.7 % (ref 0–0.5)
INR PPP: 1.14 (ref 0.9–1.1)
LYMPHOCYTES # BLD AUTO: 1.59 10*3/MM3 (ref 0.6–4.8)
LYMPHOCYTES NFR BLD AUTO: 37.1 % (ref 20–45)
MCH RBC QN AUTO: 32.1 PG (ref 27–31)
MCHC RBC AUTO-ENTMCNC: 33 G/DL (ref 31–37)
MCV RBC AUTO: 97.5 FL (ref 81–99)
MONOCYTES # BLD AUTO: 0.32 10*3/MM3 (ref 0–1)
MONOCYTES NFR BLD AUTO: 7.5 % (ref 3–8)
NEUTROPHILS # BLD AUTO: 1.52 10*3/MM3 (ref 1.5–8.3)
NEUTROPHILS NFR BLD AUTO: 35.3 % (ref 45–70)
NRBC BLD MANUAL-RTO: 0 /100 WBC (ref 0–0)
PLATELET # BLD AUTO: 292 10*3/MM3 (ref 140–500)
PMV BLD AUTO: 11.4 FL (ref 7.4–10.4)
POTASSIUM BLD-SCNC: 3.8 MMOL/L (ref 3.5–5.2)
PROTHROMBIN TIME: 14.7 SECONDS (ref 12.1–15)
RBC # BLD AUTO: 2.77 10*6/MM3 (ref 4.2–5.4)
SODIUM BLD-SCNC: 140 MMOL/L (ref 136–145)
WBC NRBC COR # BLD: 4.29 10*3/MM3 (ref 4.8–10.8)

## 2018-10-30 PROCEDURE — 85025 COMPLETE CBC W/AUTO DIFF WBC: CPT | Performed by: HOSPITALIST

## 2018-10-30 PROCEDURE — 85610 PROTHROMBIN TIME: CPT | Performed by: HOSPITALIST

## 2018-10-30 PROCEDURE — 80048 BASIC METABOLIC PNL TOTAL CA: CPT | Performed by: HOSPITALIST

## 2018-10-30 PROCEDURE — 94799 UNLISTED PULMONARY SVC/PX: CPT

## 2018-10-30 PROCEDURE — 97165 OT EVAL LOW COMPLEX 30 MIN: CPT

## 2018-10-30 PROCEDURE — 82962 GLUCOSE BLOOD TEST: CPT

## 2018-10-30 PROCEDURE — 63710000001 INSULIN ASPART PER 5 UNITS: Performed by: HOSPITALIST

## 2018-10-30 PROCEDURE — 99239 HOSP IP/OBS DSCHRG MGMT >30: CPT | Performed by: NURSE PRACTITIONER

## 2018-10-30 PROCEDURE — 97161 PT EVAL LOW COMPLEX 20 MIN: CPT

## 2018-10-30 RX ORDER — FUROSEMIDE 20 MG/1
20 TABLET ORAL DAILY
Start: 2018-10-30 | End: 2019-01-30

## 2018-10-30 RX ORDER — L.ACID,PARA/B.BIFIDUM/S.THERM 8B CELL
1 CAPSULE ORAL DAILY
Qty: 30 CAPSULE | Refills: 0 | Status: ON HOLD | OUTPATIENT
Start: 2018-10-31 | End: 2018-11-20

## 2018-10-30 RX ORDER — L.ACID,PARA/B.BIFIDUM/S.THERM 8B CELL
1 CAPSULE ORAL DAILY
Status: DISCONTINUED | OUTPATIENT
Start: 2018-10-30 | End: 2018-10-30 | Stop reason: HOSPADM

## 2018-10-30 RX ORDER — CEFUROXIME AXETIL 250 MG/1
250 TABLET ORAL EVERY 12 HOURS SCHEDULED
Qty: 9 TABLET | Refills: 0 | Status: SHIPPED | OUTPATIENT
Start: 2018-10-30 | End: 2018-11-04

## 2018-10-30 RX ORDER — ONDANSETRON 4 MG/1
4 TABLET, FILM COATED ORAL EVERY 6 HOURS PRN
Qty: 20 TABLET | Refills: 0 | Status: SHIPPED | OUTPATIENT
Start: 2018-10-30 | End: 2018-11-03

## 2018-10-30 RX ORDER — CEFUROXIME AXETIL 250 MG/1
250 TABLET ORAL EVERY 12 HOURS SCHEDULED
Status: DISCONTINUED | OUTPATIENT
Start: 2018-10-30 | End: 2018-10-30 | Stop reason: HOSPADM

## 2018-10-30 RX ORDER — APIXABAN 2.5 MG/1
2.5 TABLET, FILM COATED ORAL EVERY 12 HOURS SCHEDULED
Qty: 60 TABLET
Start: 2018-10-30 | End: 2018-11-08 | Stop reason: SDUPTHER

## 2018-10-30 RX ORDER — PROMETHAZINE HYDROCHLORIDE 12.5 MG/1
TABLET ORAL
Qty: 20 TABLET | Refills: 0 | Status: SHIPPED | OUTPATIENT
Start: 2018-10-30 | End: 2019-07-05 | Stop reason: SDUPTHER

## 2018-10-30 RX ADMIN — Medication 3 ML: at 08:25

## 2018-10-30 RX ADMIN — DULOXETINE HYDROCHLORIDE 30 MG: 30 CAPSULE, DELAYED RELEASE ORAL at 08:25

## 2018-10-30 RX ADMIN — INSULIN ASPART 4 UNITS: 100 INJECTION, SOLUTION INTRAVENOUS; SUBCUTANEOUS at 11:47

## 2018-10-30 RX ADMIN — MIDODRINE HYDROCHLORIDE 2.5 MG: 5 TABLET ORAL at 08:25

## 2018-10-30 RX ADMIN — NYSTATIN: 100000 POWDER TOPICAL at 08:25

## 2018-10-30 RX ADMIN — CEFUROXIME AXETIL 250 MG: 250 TABLET ORAL at 08:25

## 2018-10-30 RX ADMIN — Medication 1 CAPSULE: at 08:25

## 2018-10-30 RX ADMIN — LEVOTHYROXINE SODIUM 88 MCG: 0.09 TABLET ORAL at 05:51

## 2018-10-30 RX ADMIN — INSULIN ASPART 2 UNITS: 100 INJECTION, SOLUTION INTRAVENOUS; SUBCUTANEOUS at 08:25

## 2018-10-31 ENCOUNTER — READMISSION MANAGEMENT (OUTPATIENT)
Dept: CALL CENTER | Facility: HOSPITAL | Age: 78
End: 2018-10-31

## 2018-10-31 DIAGNOSIS — R26.81 UNSTEADINESS ON FEET: ICD-10-CM

## 2018-10-31 DIAGNOSIS — R26.9 ABNORMAL GAIT: Primary | ICD-10-CM

## 2018-10-31 NOTE — OUTREACH NOTE
Prep Survey      Responses   Facility patient discharged from?  [place]   Is LACE score < 7 ?  No   Is patient eligible?  Yes   Discharge diagnosis  s/p colonoscopy, ACute blood loss on chronic macrocytic anemia due to Lower GI bleed, abdominal pain with chronic nausea, GERD, acute E.Coli UTI, r/p lingular pneumonia vs. atelectasis DM II, HTN   Does the patient have one of the following disease processes/diagnoses(primary or secondary)?  Other   Does the patient have Home health ordered?  Yes   What is the Home health agency?   [facility]   Is there a DME ordered?  No   What DME was ordered?  Pt has CPAP and nebulizer   Comments regarding appointments  See AVS   Prep survey completed?  Yes          Ivanna Root RN

## 2018-11-03 ENCOUNTER — READMISSION MANAGEMENT (OUTPATIENT)
Dept: CALL CENTER | Facility: HOSPITAL | Age: 78
End: 2018-11-03

## 2018-11-03 NOTE — OUTREACH NOTE
Medical Week 1 Survey      Responses   Facility patient discharged from?  [facility]   Does the patient have one of the following disease processes/diagnoses(primary or secondary)?  Other   Is there a successful TCM telephone encounter documented?  No   Week 1 attempt successful?  No   Unsuccessful attempts  Attempt 1          Jaime Perez RN

## 2018-11-05 ENCOUNTER — TELEPHONE (OUTPATIENT)
Dept: INTERNAL MEDICINE | Facility: CLINIC | Age: 78
End: 2018-11-05

## 2018-11-05 NOTE — TELEPHONE ENCOUNTER
Advised Cindy orders OK.    ----- Message from Beth Farley MA sent at 11/5/2018  9:05 AM EST -----  Regarding: FW: PT ORDERS FOR HOME HEALTH      ----- Message -----  From: Deb Corea  Sent: 11/5/2018   8:54 AM  To: Izabella Mercado Clinical [facility]  Subject: PT ORDERS FOR HOME HEALTH                        ÁLVARO PT    [facility] for [facility] to have physical therapy.  Please call cindy back at [phone]    Thanks!  deb

## 2018-11-06 ENCOUNTER — OFFICE VISIT (OUTPATIENT)
Dept: INTERNAL MEDICINE | Facility: CLINIC | Age: 78
End: 2018-11-06

## 2018-11-06 ENCOUNTER — READMISSION MANAGEMENT (OUTPATIENT)
Dept: CALL CENTER | Facility: HOSPITAL | Age: 78
End: 2018-11-06

## 2018-11-06 VITALS
TEMPERATURE: 97.9 F | OXYGEN SATURATION: 93 % | DIASTOLIC BLOOD PRESSURE: 80 MMHG | SYSTOLIC BLOOD PRESSURE: 140 MMHG | HEIGHT: 62 IN | RESPIRATION RATE: 20 BRPM | HEART RATE: 74 BPM

## 2018-11-06 DIAGNOSIS — K92.2 ACUTE GI BLEEDING: ICD-10-CM

## 2018-11-06 DIAGNOSIS — Z79.4 TYPE 2 DIABETES MELLITUS WITH DIABETIC NEUROPATHY, WITH LONG-TERM CURRENT USE OF INSULIN (HCC): ICD-10-CM

## 2018-11-06 DIAGNOSIS — I95.1 ORTHOSTATIC HYPOTENSION: ICD-10-CM

## 2018-11-06 DIAGNOSIS — E11.22 CKD STAGE 3 DUE TO TYPE 2 DIABETES MELLITUS (HCC): ICD-10-CM

## 2018-11-06 DIAGNOSIS — Z09 HOSPITAL DISCHARGE FOLLOW-UP: Primary | ICD-10-CM

## 2018-11-06 DIAGNOSIS — I10 ESSENTIAL HYPERTENSION: ICD-10-CM

## 2018-11-06 DIAGNOSIS — E11.40 TYPE 2 DIABETES MELLITUS WITH DIABETIC NEUROPATHY, WITH LONG-TERM CURRENT USE OF INSULIN (HCC): ICD-10-CM

## 2018-11-06 DIAGNOSIS — N18.30 CKD STAGE 3 DUE TO TYPE 2 DIABETES MELLITUS (HCC): ICD-10-CM

## 2018-11-06 LAB
ALBUMIN SERPL-MCNC: 4.1 G/DL (ref 3.5–5.2)
ALBUMIN/GLOB SERPL: 1.5 G/DL
ALP SERPL-CCNC: 116 U/L (ref 40–129)
ALT SERPL-CCNC: 10 U/L (ref 5–33)
AST SERPL-CCNC: 11 U/L (ref 5–32)
BASOPHILS # BLD AUTO: 0.1 10*3/MM3 (ref 0–0.2)
BASOPHILS NFR BLD AUTO: 1.9 % (ref 0–2)
BILIRUB SERPL-MCNC: 0.6 MG/DL (ref 0.2–1.2)
BUN SERPL-MCNC: 23 MG/DL (ref 8–23)
BUN/CREAT SERPL: 14.9 (ref 7–25)
CALCIUM SERPL-MCNC: 9.1 MG/DL (ref 8.8–10.5)
CHLORIDE SERPL-SCNC: 103 MMOL/L (ref 98–107)
CO2 SERPL-SCNC: 29.5 MMOL/L (ref 22–29)
CREAT SERPL-MCNC: 1.54 MG/DL (ref 0.57–1)
EOSINOPHIL # BLD AUTO: 0.39 10*3/MM3 (ref 0.1–0.3)
EOSINOPHIL NFR BLD AUTO: 7.6 % (ref 0–4)
ERYTHROCYTE [DISTWIDTH] IN BLOOD BY AUTOMATED COUNT: 18.1 % (ref 11.5–14.5)
GLOBULIN SER CALC-MCNC: 2.8 GM/DL
GLUCOSE SERPL-MCNC: 102 MG/DL (ref 65–99)
HCT VFR BLD AUTO: 27.2 % (ref 37–47)
HGB BLD-MCNC: 8.8 G/DL (ref 12–16)
IMM GRANULOCYTES # BLD: 0.05 10*3/MM3 (ref 0–0.03)
IMM GRANULOCYTES NFR BLD: 1 % (ref 0–0.5)
LYMPHOCYTES # BLD AUTO: 1.82 10*3/MM3 (ref 0.6–4.8)
LYMPHOCYTES NFR BLD AUTO: 35.3 % (ref 20–45)
MCH RBC QN AUTO: 32.1 PG (ref 27–31)
MCHC RBC AUTO-ENTMCNC: 32.4 G/DL (ref 31–37)
MCV RBC AUTO: 99.3 FL (ref 81–99)
MONOCYTES # BLD AUTO: 0.24 10*3/MM3 (ref 0–1)
MONOCYTES NFR BLD AUTO: 4.7 % (ref 3–8)
NEUTROPHILS # BLD AUTO: 2.56 10*3/MM3 (ref 1.5–8.3)
NEUTROPHILS NFR BLD AUTO: 49.5 % (ref 45–70)
NRBC BLD AUTO-RTO: 0 /100 WBC (ref 0–0)
PLATELET # BLD AUTO: 352 10*3/MM3 (ref 140–500)
POTASSIUM SERPL-SCNC: 4.6 MMOL/L (ref 3.5–5.2)
PROT SERPL-MCNC: 6.9 G/DL (ref 6–8.5)
RBC # BLD AUTO: 2.74 10*6/MM3 (ref 4.2–5.4)
SODIUM SERPL-SCNC: 142 MMOL/L (ref 136–145)
WBC # BLD AUTO: 5.16 10*3/MM3 (ref 4.8–10.8)

## 2018-11-06 PROCEDURE — 99214 OFFICE O/P EST MOD 30 MIN: CPT | Performed by: INTERNAL MEDICINE

## 2018-11-06 NOTE — OUTREACH NOTE
Medical Week 1 Survey      Responses   Facility patient discharged from?  [facility]   Does the patient have one of the following disease processes/diagnoses(primary or secondary)?  Other   Is there a successful TCM telephone encounter documented?  No   Week 1 attempt successful?  Yes   Call start time  0948   Call end time  0957   Discharge diagnosis  s/p colonoscopy, ACute blood loss on chronic macrocytic anemia due to Lower GI bleed, abdominal pain with chronic nausea, GERD, acute E.Coli UTI, r/p lingular pneumonia vs. atelectasis DM II, HTN   Is patient permission given to speak with other caregiver?  No   Meds reviewed with patient/caregiver?  Yes   Is the patient having any side effects they believe may be caused by any medication additions or changes?  No   Does the patient have all medications ordered at discharge?  Yes   Is the patient taking all medications as directed (includes completed medication regime)?  Yes   Comments regarding appointments  She has an appt. today with her Dr. Mercado on 11/06/2018    Does the patient have a primary care provider?   Yes   Does the patient have an appointment with their PCP within 7 days of discharge?  Yes   Has the patient kept scheduled appointments due by today?  N/A   What is the Home health agency?   [facility]   Has home health visited the patient within 72 hours of discharge?  Yes   What DME was ordered?  Pt has CPAP and nebulizer She states she is on home 02.   Has all DME been delivered?  Yes   Psychosocial issues?  No   Comments  She states she feel poorly feels like she is not getting better since getting home. Has a PCP  appt today.    Did the patient receive a copy of their discharge instructions?  Yes   Nursing interventions  Reviewed instructions with patient   What is the patient's perception of their health status since discharge?  Worsening [She feels that she is not getting any better. ]   Is the patient/caregiver able to teach back signs and symptoms related  to disease process for when to call PCP?  Yes   Is the patient/caregiver able to teach back signs and symptoms related to disease process for when to call 911?  Yes   Is the patient/caregiver able to teach back the hierarchy of who to call/visit for symptoms/problems? PCP, Specialist, Home health nurse, Urgent Care, ED, 911  Yes   Additional teach back comments  She has an appt. to see PCP.   Week 1 call completed?  Yes          Michaela Benites RN

## 2018-11-06 NOTE — PROGRESS NOTES
Joya Singh is a 78 y.o. female, who presents with a chief complaint of   Chief Complaint   Patient presents with   • Follow-up     Hospital follow up  GI Bleed       HPI   The pt is here for hospital follow up.  She went to the ED on 10/25 bc of worsening dizziness.  She says she was gray.  She had epigastric pain, melena and chest pain on admission.  hgb as low as 7 and she was given 5 units PRBC's.  The bleed was thought to be 2/2 avm as no acute findings on egd or colonoscopy.  At d/c her hgb was 8.9.  Pt had been on eliquis bc of recurrent clots.  She was supposed to restart her meds yesterday.     Pt initially treated for PNA but based on response it was thought she had more of an atelectasis picture.  Pt feels like her breathing is much better.  He son thinks she is walking more and not getting as short of breath    Dizziness - she is back to her baseline level of dizziness. She saw ent and is going to resume her vestibular PT.      DM  - Pt had been doing much better with glucoses.  a1c had been around 7 since February but a1c last week 8.2.  She says glucoses this week have been better.     labile bp's/orthostatic hypotension - pt started on midodrine last week.  She says bp still going low sometimes.     The following portions of the patient's history were reviewed and updated as appropriate: allergies, current medications, past family history, past medical history, past social history, past surgical history and problem list.    Allergies: Lisinopril; Morphine and related; Baclofen; Codeine; Morphine; and Penicillins    Review of Systems   Constitutional: Negative.    HENT: Negative.    Eyes: Negative.    Respiratory: Negative.    Cardiovascular: Negative.    Gastrointestinal: Negative.    Endocrine: Negative.    Genitourinary: Negative.    Musculoskeletal: Negative.    Skin: Negative.    Allergic/Immunologic: Negative.    Neurological: Negative.    Hematological: Negative.    Psychiatric/Behavioral:  Negative.    All other systems reviewed and are negative.            Wt Readings from Last 3 Encounters:   10/30/18 90 kg (198 lb 6.6 oz)   10/16/18 94.1 kg (207 lb 8 oz)   10/04/18 92.1 kg (203 lb)     Temp Readings from Last 3 Encounters:   11/06/18 97.9 °F (36.6 °C)   10/30/18 98.4 °F (36.9 °C) (Oral)   10/16/18 98.4 °F (36.9 °C) (Oral)     BP Readings from Last 3 Encounters:   11/06/18 140/80   10/30/18 172/74   10/10/18 118/72     Pulse Readings from Last 3 Encounters:   11/06/18 74   10/30/18 88   10/16/18 92     There is no height or weight on file to calculate BMI.  @LASTSAO2(3)@    Physical Exam   Constitutional: She is oriented to person, place, and time. She appears well-developed and well-nourished. No distress.   HENT:   Head: Normocephalic and atraumatic.   Right Ear: External ear normal.   Left Ear: External ear normal.   Nose: Nose normal.   Mouth/Throat: Oropharynx is clear and moist.   Eyes: Pupils are equal, round, and reactive to light. Conjunctivae and EOM are normal.   Neck: Normal range of motion. Neck supple.   Cardiovascular: Normal rate, regular rhythm, normal heart sounds and intact distal pulses.    Pulmonary/Chest: Effort normal and breath sounds normal. No respiratory distress. She has no wheezes.   Musculoskeletal: Normal range of motion.   Normal gait   Neurological: She is alert and oriented to person, place, and time.   Skin: Skin is warm and dry.   Psychiatric: She has a normal mood and affect. Her behavior is normal. Judgment and thought content normal.   Nursing note and vitals reviewed.      Results for orders placed or performed during the hospital encounter of 10/25/18   Urine Culture - Urine, Urine, Clean Catch   Result Value Ref Range    Urine Culture >100,000 CFU/mL Escherichia coli (A)        Susceptibility    Escherichia coli - NEEMA     Ampicillin >=32 Resistant ug/ml     Ampicillin + Sulbactam 16 Intermediate ug/ml     Cefazolin <=4 Susceptible ug/ml     Cefepime <=1  Susceptible ug/ml     Ceftriaxone <=1 Susceptible ug/ml     Ciprofloxacin <=0.25 Susceptible ug/ml     Ertapenem <=0.5 Susceptible ug/ml     Gentamicin <=1 Susceptible ug/ml     Levofloxacin <=0.12 Susceptible ug/ml     Nitrofurantoin <=16 Susceptible ug/ml     Piperacillin + Tazobactam <=4 Susceptible ug/ml     Tetracycline <=1 Susceptible ug/ml     Trimethoprim + Sulfamethoxazole <=20 Susceptible ug/ml   Blood Culture - Blood,   Result Value Ref Range    Blood Culture No growth at 5 days    Blood Culture - Blood,   Result Value Ref Range    Blood Culture No growth at 5 days    Respiratory Panel, PCR - Swab, Nasopharynx   Result Value Ref Range    ADENOVIRUS, PCR Not Detected Not Detected    Coronavirus 229E Not Detected Not Detected    Coronavirus HKU1 Not Detected Not Detected    Coronavirus NL63 Not Detected Not Detected    Coronavirus OC43 Not Detected Not Detected    Human Metapneumovirus Not Detected Not Detected    Human Rhinovirus/Enterovirus Not Detected Not Detected    Influenza B PCR Not Detected Not Detected    Parainfluenza Virus 1 Not Detected Not Detected    Parainfluenza Virus 2 Not Detected Not Detected    Parainfluenza Virus 3 Not Detected Not Detected    Parainfluenza Virus 4 Not Detected Not Detected    Bordetella pertussis pcr Not Detected Not Detected    Influenza A H1 2009 PCR Not Detected Not Detected    Chlamydophila pneumoniae PCR Not Detected Not Detected    Mycoplasma pneumo by PCR Not Detected Not Detected    Influenza A PCR Not Detected Not Detected    Influenza A H3 Not Detected Not Detected    Influenza A H1 Not Detected Not Detected    RSV, PCR Not Detected Not Detected   Gastrointestinal Panel, PCR - Stool, Per Rectum   Result Value Ref Range    Campylobacter Not Detected Not Detected    Plesiomonas shigelloides Not Detected Not Detected    Salmonella Not Detected Not Detected    Vibrio Not Detected Not Detected    Vibrio cholerae Not Detected Not Detected    Yersinia  enterocolitica Not Detected Not Detected    Enteroaggregative E. coli (EAEC) Not Detected Not Detected    Enteropathogenic E. coli (EPEC) Not Detected Not Detected    Enterotoxigenic E. coli (ETEC) lt/st Not Detected Not Detected    Shiga-like toxin-producing E. coli (STEC) stx1/stx2 Not Detected Not Detected    E. coli O157 Not Detected Not Detected    Shigella/Enteroinvasive E. coli (EIEC) Not Detected Not Detected    Cryptosporidium Not Detected Not Detected    Cyclospora cayetanensis Not Detected Not Detected    Entamoeba histolytica Not Detected Not Detected    Giardia lamblia Not Detected Not Detected    Adenovirus F40/41 Not Detected Not Detected    Astrovirus Not Detected Not Detected    Norovirus GI/GII Not Detected Not Detected    Rotavirus A Not Detected Not Detected    Sapovirus (I, II, IV or V) Not Detected Not Detected   Clostridium Difficile EIA - Stool, Per Rectum   Result Value Ref Range    C Diff GDH / Toxin Negative Negative   Comprehensive Metabolic Panel   Result Value Ref Range    Glucose 200 (H) 65 - 99 mg/dL    BUN 18 8 - 23 mg/dL    Creatinine 1.83 (H) 0.57 - 1.00 mg/dL    Sodium 139 136 - 145 mmol/L    Potassium 4.5 3.5 - 5.2 mmol/L    Chloride 102 98 - 107 mmol/L    CO2 26.1 22.0 - 29.0 mmol/L    Calcium 8.6 (L) 8.8 - 10.5 mg/dL    Total Protein 6.1 6.0 - 8.5 g/dL    Albumin 3.80 3.50 - 5.20 g/dL    ALT (SGPT) 9 5 - 33 U/L    AST (SGOT) 10 5 - 32 U/L    Alkaline Phosphatase 113 40 - 129 U/L    Total Bilirubin 0.4 0.2 - 1.2 mg/dL    eGFR Non African Amer 27 (L) >60 mL/min/1.73    Globulin 2.3 gm/dL    A/G Ratio 1.7 g/dL    BUN/Creatinine Ratio 9.8 7.0 - 25.0    Anion Gap 10.9 mmol/L   Protime-INR   Result Value Ref Range    Protime 14.9 12.1 - 15.0 Seconds    INR 1.16 (H) 0.90 - 1.10   aPTT   Result Value Ref Range    PTT 32.5 24.3 - 38.1 seconds   Urinalysis With Microscopic If Indicated (No Culture) - Urine, Clean Catch   Result Value Ref Range    Color, UA Yellow Yellow, Straw     Appearance, UA Clear Clear    pH, UA 7.5 4.5 - 8.0    Specific Gravity, UA 1.020 1.003 - 1.030    Glucose, UA Negative Negative    Ketones, UA Negative Negative, 80 mg/dL (3+), >=160 mg/dL (4+)    Bilirubin, UA Negative Negative    Blood, UA Trace (A) Negative    Protein, UA 30 mg/dL (1+) (A) Negative    Leuk Esterase, UA Moderate (2+) (A) Negative    Nitrite, UA Positive (A) Negative    Urobilinogen, UA 0.2 E.U./dL 0.2 - 1.0 E.U./dL   Troponin   Result Value Ref Range    Troponin T <0.010 0.000 - 0.030 ng/mL   D-dimer, Quantitative   Result Value Ref Range    D-Dimer, Quantitative 0.32 0.00 - 0.46 MCGFEU/mL   BNP   Result Value Ref Range    proBNP 513.0 0.0-1,800.0 pg/mL   TSH   Result Value Ref Range    TSH 1.810 0.270 - 4.200 mIU/mL   Magnesium   Result Value Ref Range    Magnesium 2.1 1.7 - 2.5 mg/dL   CBC Auto Differential   Result Value Ref Range    WBC 5.84 4.80 - 10.80 10*3/mm3    RBC 2.11 (L) 4.20 - 5.40 10*6/mm3    Hemoglobin 7.0 (C) 12.0 - 16.0 g/dL    Hematocrit 21.4 (C) 37.0 - 47.0 %    .4 (H) 81.0 - 99.0 fL    MCH 33.2 (H) 27.0 - 31.0 pg    MCHC 32.7 31.0 - 37.0 g/dL    RDW 18.9 (H) 11.5 - 14.5 %    RDW-SD 69.4 (H) 37.0 - 54.0 fl    MPV 11.1 (H) 7.4 - 10.4 fL    Platelets 371 140 - 500 10*3/mm3    Neutrophil % 51.8 45.0 - 70.0 %    Lymphocyte % 36.0 20.0 - 45.0 %    Monocyte % 5.1 3.0 - 8.0 %    Eosinophil % 5.0 (H) 0.0 - 4.0 %    Basophil % 1.4 0.0 - 2.0 %    Immature Grans % 0.7 (H) 0.0 - 0.5 %    Neutrophils, Absolute 3.03 1.50 - 8.30 10*3/mm3    Lymphocytes, Absolute 2.10 0.60 - 4.80 10*3/mm3    Monocytes, Absolute 0.30 0.00 - 1.00 10*3/mm3    Eosinophils, Absolute 0.29 0.10 - 0.30 10*3/mm3    Basophils, Absolute 0.08 0.00 - 0.20 10*3/mm3    Immature Grans, Absolute 0.04 (H) 0.00 - 0.03 10*3/mm3    nRBC 0.0 0.0 - 0.0 /100 WBC   Urinalysis, Microscopic Only - Urine, Clean Catch   Result Value Ref Range    RBC, UA 3-5 (A) None Seen /HPF    WBC, UA 21-30 (A) None Seen /HPF    Bacteria, UA 2+  (A) None Seen /HPF    Squamous Epithelial Cells, UA 3-6 (A) None Seen, 0-2 /HPF    Hyaline Casts, UA None Seen None Seen /LPF    Methodology Manual Light Microscopy    Lactic Acid, Plasma   Result Value Ref Range    Lactate 1.0 0.5 - 2.0 mmol/L   Basic Metabolic Panel   Result Value Ref Range    Glucose 192 (H) 65 - 99 mg/dL    BUN 15 8 - 23 mg/dL    Creatinine 1.89 (H) 0.57 - 1.00 mg/dL    Sodium 140 136 - 145 mmol/L    Potassium 3.9 3.5 - 5.2 mmol/L    Chloride 102 98 - 107 mmol/L    CO2 25.9 22.0 - 29.0 mmol/L    Calcium 8.7 (L) 8.8 - 10.5 mg/dL    eGFR Non African Amer 26 (L) >60 mL/min/1.73    BUN/Creatinine Ratio 7.9 7.0 - 25.0    Anion Gap 12.1 mmol/L   Protime-INR   Result Value Ref Range    Protime 15.2 (H) 12.1 - 15.0 Seconds    INR 1.19 (H) 0.90 - 1.10   CBC Auto Differential   Result Value Ref Range    WBC 7.02 4.80 - 10.80 10*3/mm3    RBC 2.45 (L) 4.20 - 5.40 10*6/mm3    Hemoglobin 7.8 (L) 12.0 - 16.0 g/dL    Hematocrit 23.9 (C) 37.0 - 47.0 %    MCV 97.6 81.0 - 99.0 fL    MCH 31.8 (H) 27.0 - 31.0 pg    MCHC 32.6 31.0 - 37.0 g/dL    RDW 19.7 (H) 11.5 - 14.5 %    RDW-SD 70.2 (H) 37.0 - 54.0 fl    MPV 11.1 (H) 7.4 - 10.4 fL    Platelets 372 140 - 500 10*3/mm3    Neutrophil % 48.0 45.0 - 70.0 %    Lymphocyte % 38.9 20.0 - 45.0 %    Monocyte % 5.1 3.0 - 8.0 %    Eosinophil % 5.3 (H) 0.0 - 4.0 %    Basophil % 1.6 0.0 - 2.0 %    Immature Grans % 1.1 (H) 0.0 - 0.5 %    Neutrophils, Absolute 3.37 1.50 - 8.30 10*3/mm3    Lymphocytes, Absolute 2.73 0.60 - 4.80 10*3/mm3    Monocytes, Absolute 0.36 0.00 - 1.00 10*3/mm3    Eosinophils, Absolute 0.37 (H) 0.10 - 0.30 10*3/mm3    Basophils, Absolute 0.11 0.00 - 0.20 10*3/mm3    Immature Grans, Absolute 0.08 (H) 0.00 - 0.03 10*3/mm3    nRBC 0.0 0.0 - 0.0 /100 WBC   Troponin   Result Value Ref Range    Troponin T <0.010 0.000 - 0.030 ng/mL   Basic Metabolic Panel   Result Value Ref Range    Glucose 156 (H) 65 - 99 mg/dL    BUN 14 8 - 23 mg/dL    Creatinine 1.87 (H) 0.57 -  1.00 mg/dL    Sodium 144 136 - 145 mmol/L    Potassium 4.1 3.5 - 5.2 mmol/L    Chloride 106 98 - 107 mmol/L    CO2 25.7 22.0 - 29.0 mmol/L    Calcium 9.0 8.8 - 10.5 mg/dL    eGFR Non African Amer 26 (L) >60 mL/min/1.73    BUN/Creatinine Ratio 7.5 7.0 - 25.0    Anion Gap 12.3 mmol/L   Protime-INR   Result Value Ref Range    Protime 14.8 12.1 - 15.0 Seconds    INR 1.15 (H) 0.90 - 1.10   CBC Auto Differential   Result Value Ref Range    WBC 5.89 4.80 - 10.80 10*3/mm3    RBC 2.53 (L) 4.20 - 5.40 10*6/mm3    Hemoglobin 7.9 (L) 12.0 - 16.0 g/dL    Hematocrit 24.6 (L) 37.0 - 47.0 %    MCV 97.2 81.0 - 99.0 fL    MCH 31.2 (H) 27.0 - 31.0 pg    MCHC 32.1 31.0 - 37.0 g/dL    RDW 19.9 (H) 11.5 - 14.5 %    RDW-SD 69.8 (H) 37.0 - 54.0 fl    MPV 11.4 (H) 7.4 - 10.4 fL    Platelets 402 140 - 500 10*3/mm3    Neutrophil % 44.4 (L) 45.0 - 70.0 %    Lymphocyte % 41.4 20.0 - 45.0 %    Monocyte % 5.4 3.0 - 8.0 %    Eosinophil % 6.6 (H) 0.0 - 4.0 %    Basophil % 1.5 0.0 - 2.0 %    Immature Grans % 0.7 (H) 0.0 - 0.5 %    Neutrophils, Absolute 2.61 1.50 - 8.30 10*3/mm3    Lymphocytes, Absolute 2.44 0.60 - 4.80 10*3/mm3    Monocytes, Absolute 0.32 0.00 - 1.00 10*3/mm3    Eosinophils, Absolute 0.39 (H) 0.10 - 0.30 10*3/mm3    Basophils, Absolute 0.09 0.00 - 0.20 10*3/mm3    Immature Grans, Absolute 0.04 (H) 0.00 - 0.03 10*3/mm3    nRBC 0.0 0.0 - 0.0 /100 WBC   Hemoglobin A1c   Result Value Ref Range    Hemoglobin A1C 8.20 (H) 4.80 - 5.60 %   Amylase   Result Value Ref Range    Amylase 23 (L) 28 - 100 U/L   Lipase   Result Value Ref Range    Lipase 29 13 - 60 U/L   Procalcitonin   Result Value Ref Range    Procalcitonin 0.19 0.10 - 0.25 ng/mL   Protime-INR   Result Value Ref Range    Protime 14.8 12.1 - 15.0 Seconds    INR 1.15 (H) 0.90 - 1.10   Comprehensive Metabolic Panel   Result Value Ref Range    Glucose 200 (H) 65 - 99 mg/dL    BUN 12 8 - 23 mg/dL    Creatinine 1.71 (H) 0.57 - 1.00 mg/dL    Sodium 140 136 - 145 mmol/L    Potassium 4.1  3.5 - 5.2 mmol/L    Chloride 104 98 - 107 mmol/L    CO2 25.1 22.0 - 29.0 mmol/L    Calcium 8.5 (L) 8.8 - 10.5 mg/dL    Total Protein 6.4 6.0 - 8.5 g/dL    Albumin 3.60 3.50 - 5.20 g/dL    ALT (SGPT) 11 5 - 33 U/L    AST (SGOT) 11 5 - 32 U/L    Alkaline Phosphatase 109 40 - 129 U/L    Total Bilirubin 0.7 0.2 - 1.2 mg/dL    eGFR Non African Amer 29 (L) >60 mL/min/1.73    Globulin 2.8 gm/dL    A/G Ratio 1.3 g/dL    BUN/Creatinine Ratio 7.0 7.0 - 25.0    Anion Gap 10.9 mmol/L   CBC Auto Differential   Result Value Ref Range    WBC 5.73 4.80 - 10.80 10*3/mm3    RBC 2.41 (L) 4.20 - 5.40 10*6/mm3    Hemoglobin 7.9 (L) 12.0 - 16.0 g/dL    Hematocrit 24.4 (L) 37.0 - 47.0 %    .2 (H) 81.0 - 99.0 fL    MCH 32.8 (H) 27.0 - 31.0 pg    MCHC 32.4 31.0 - 37.0 g/dL    RDW 19.4 (H) 11.5 - 14.5 %    RDW-SD 70.2 (H) 37.0 - 54.0 fl    MPV 11.6 (H) 7.4 - 10.4 fL    Platelets 374 140 - 500 10*3/mm3    Neutrophil % 48.6 45.0 - 70.0 %    Lymphocyte % 32.8 20.0 - 45.0 %    Monocyte % 5.4 3.0 - 8.0 %    Eosinophil % 9.9 (H) 0.0 - 4.0 %    Basophil % 2.1 (H) 0.0 - 2.0 %    Immature Grans % 1.2 (H) 0.0 - 0.5 %    Neutrophils, Absolute 2.78 1.50 - 8.30 10*3/mm3    Lymphocytes, Absolute 1.88 0.60 - 4.80 10*3/mm3    Monocytes, Absolute 0.31 0.00 - 1.00 10*3/mm3    Eosinophils, Absolute 0.57 (H) 0.10 - 0.30 10*3/mm3    Basophils, Absolute 0.12 0.00 - 0.20 10*3/mm3    Immature Grans, Absolute 0.07 (H) 0.00 - 0.03 10*3/mm3    nRBC 0.3 (H) 0.0 - 0.0 /100 WBC   Scan Slide   Result Value Ref Range    RBC Morphology Normal Normal    Anisocytosis  None Seen    WBC Morphology Normal Normal    Platelet Morphology Normal Normal   Basic Metabolic Panel   Result Value Ref Range    Glucose 179 (H) 65 - 99 mg/dL    BUN 9 8 - 23 mg/dL    Creatinine 1.62 (H) 0.57 - 1.00 mg/dL    Sodium 141 136 - 145 mmol/L    Potassium 4.0 3.5 - 5.2 mmol/L    Chloride 105 98 - 107 mmol/L    CO2 25.4 22.0 - 29.0 mmol/L    Calcium 8.3 (L) 8.8 - 10.5 mg/dL    eGFR Non African  Amer 31 (L) >60 mL/min/1.73    BUN/Creatinine Ratio 5.6 (L) 7.0 - 25.0    Anion Gap 10.6 mmol/L   Protime-INR   Result Value Ref Range    Protime 15.4 (H) 12.1 - 15.0 Seconds    INR 1.21 (H) 0.90 - 1.10   CBC Auto Differential   Result Value Ref Range    WBC 5.36 4.80 - 10.80 10*3/mm3    RBC 2.21 (L) 4.20 - 5.40 10*6/mm3    Hemoglobin 7.1 (C) 12.0 - 16.0 g/dL    Hematocrit 22.7 (C) 37.0 - 47.0 %    .7 (H) 81.0 - 99.0 fL    MCH 32.1 (H) 27.0 - 31.0 pg    MCHC 31.3 31.0 - 37.0 g/dL    RDW 19.6 (H) 11.5 - 14.5 %    RDW-SD 71.2 (H) 37.0 - 54.0 fl    MPV 11.4 (H) 7.4 - 10.4 fL    Platelets 392 140 - 500 10*3/mm3    Neutrophil % 47.0 45.0 - 70.0 %    Lymphocyte % 33.2 20.0 - 45.0 %    Monocyte % 6.0 3.0 - 8.0 %    Eosinophil % 11.9 (H) 0.0 - 4.0 %    Basophil % 1.5 0.0 - 2.0 %    Immature Grans % 0.4 0.0 - 0.5 %    Neutrophils, Absolute 2.52 1.50 - 8.30 10*3/mm3    Lymphocytes, Absolute 1.78 0.60 - 4.80 10*3/mm3    Monocytes, Absolute 0.32 0.00 - 1.00 10*3/mm3    Eosinophils, Absolute 0.64 (H) 0.10 - 0.30 10*3/mm3    Basophils, Absolute 0.08 0.00 - 0.20 10*3/mm3    Immature Grans, Absolute 0.02 0.00 - 0.03 10*3/mm3    nRBC 0.0 0.0 - 0.0 /100 WBC   Basic Metabolic Panel   Result Value Ref Range    Glucose 169 (H) 65 - 99 mg/dL    BUN 10 8 - 23 mg/dL    Creatinine 1.63 (H) 0.57 - 1.00 mg/dL    Sodium 142 136 - 145 mmol/L    Potassium 4.0 3.5 - 5.2 mmol/L    Chloride 107 98 - 107 mmol/L    CO2 25.9 22.0 - 29.0 mmol/L    Calcium 8.5 (L) 8.8 - 10.5 mg/dL    eGFR Non African Amer 31 (L) >60 mL/min/1.73    BUN/Creatinine Ratio 6.1 (L) 7.0 - 25.0    Anion Gap 9.1 mmol/L   Protime-INR   Result Value Ref Range    Protime 14.8 12.1 - 15.0 Seconds    INR 1.15 (H) 0.90 - 1.10   CBC Auto Differential   Result Value Ref Range    WBC 5.42 4.80 - 10.80 10*3/mm3    RBC 2.76 (L) 4.20 - 5.40 10*6/mm3    Hemoglobin 8.8 (L) 12.0 - 16.0 g/dL    Hematocrit 26.8 (L) 37.0 - 47.0 %    MCV 97.1 81.0 - 99.0 fL    MCH 31.9 (H) 27.0 - 31.0 pg     MCHC 32.8 31.0 - 37.0 g/dL    RDW 19.5 (H) 11.5 - 14.5 %    RDW-SD 65.1 (H) 37.0 - 54.0 fl    MPV 11.2 (H) 7.4 - 10.4 fL    Platelets 309 140 - 500 10*3/mm3    Neutrophil % 44.8 (L) 45.0 - 70.0 %    Lymphocyte % 34.1 20.0 - 45.0 %    Monocyte % 7.7 3.0 - 8.0 %    Eosinophil % 10.9 (H) 0.0 - 4.0 %    Basophil % 1.8 0.0 - 2.0 %    Immature Grans % 0.7 (H) 0.0 - 0.5 %    Neutrophils, Absolute 2.42 1.50 - 8.30 10*3/mm3    Lymphocytes, Absolute 1.85 0.60 - 4.80 10*3/mm3    Monocytes, Absolute 0.42 0.00 - 1.00 10*3/mm3    Eosinophils, Absolute 0.59 (H) 0.10 - 0.30 10*3/mm3    Basophils, Absolute 0.10 0.00 - 0.20 10*3/mm3    Immature Grans, Absolute 0.04 (H) 0.00 - 0.03 10*3/mm3    nRBC 0.0 0.0 - 0.0 /100 WBC   Scan Slide   Result Value Ref Range    Anisocytosis  None Seen    Dimorphic RBC Present None Seen    WBC Morphology Normal Normal    Platelet Morphology Normal Normal   Procalcitonin   Result Value Ref Range    Procalcitonin 0.17 0.10 - 0.25 ng/mL   Basic Metabolic Panel   Result Value Ref Range    Glucose 163 (H) 65 - 99 mg/dL    BUN 9 8 - 23 mg/dL    Creatinine 1.51 (H) 0.57 - 1.00 mg/dL    Sodium 140 136 - 145 mmol/L    Potassium 3.8 3.5 - 5.2 mmol/L    Chloride 104 98 - 107 mmol/L    CO2 23.2 22.0 - 29.0 mmol/L    Calcium 8.6 (L) 8.8 - 10.5 mg/dL    eGFR Non African Amer 34 (L) >60 mL/min/1.73    BUN/Creatinine Ratio 6.0 (L) 7.0 - 25.0    Anion Gap 12.8 mmol/L   Protime-INR   Result Value Ref Range    Protime 14.7 12.1 - 15.0 Seconds    INR 1.14 (H) 0.90 - 1.10   CBC Auto Differential   Result Value Ref Range    WBC 4.29 (L) 4.80 - 10.80 10*3/mm3    RBC 2.77 (L) 4.20 - 5.40 10*6/mm3    Hemoglobin 8.9 (L) 12.0 - 16.0 g/dL    Hematocrit 27.0 (L) 37.0 - 47.0 %    MCV 97.5 81.0 - 99.0 fL    MCH 32.1 (H) 27.0 - 31.0 pg    MCHC 33.0 31.0 - 37.0 g/dL    RDW 19.4 (H) 11.5 - 14.5 %    RDW-SD 65.4 (H) 37.0 - 54.0 fl    MPV 11.4 (H) 7.4 - 10.4 fL    Platelets 292 140 - 500 10*3/mm3    Neutrophil % 35.3 (L) 45.0 - 70.0 %     Lymphocyte % 37.1 20.0 - 45.0 %    Monocyte % 7.5 3.0 - 8.0 %    Eosinophil % 12.8 (H) 0.0 - 4.0 %    Basophil % 2.6 (H) 0.0 - 2.0 %    Immature Grans % 4.7 (H) 0.0 - 0.5 %    Neutrophils, Absolute 1.52 1.50 - 8.30 10*3/mm3    Lymphocytes, Absolute 1.59 0.60 - 4.80 10*3/mm3    Monocytes, Absolute 0.32 0.00 - 1.00 10*3/mm3    Eosinophils, Absolute 0.55 (H) 0.10 - 0.30 10*3/mm3    Basophils, Absolute 0.11 0.00 - 0.20 10*3/mm3    Immature Grans, Absolute 0.20 (H) 0.00 - 0.03 10*3/mm3    nRBC 0.0 0.0 - 0.0 /100 WBC   POC Glucose Once   Result Value Ref Range    Glucose 193 (H) 70 - 130 mg/dL   POC Glucose Once   Result Value Ref Range    Glucose 184 (H) 70 - 130 mg/dL   POC Glucose Once   Result Value Ref Range    Glucose 162 (H) 70 - 130 mg/dL   POC Glucose Once   Result Value Ref Range    Glucose 164 (H) 70 - 130 mg/dL   POC Glucose Once   Result Value Ref Range    Glucose 197 (H) 70 - 130 mg/dL   POC Glucose Once   Result Value Ref Range    Glucose 202 (H) 70 - 130 mg/dL   POC Glucose Once   Result Value Ref Range    Glucose 186 (H) 70 - 130 mg/dL   POC Glucose Once   Result Value Ref Range    Glucose 191 (H) 70 - 130 mg/dL   POC Glucose Once   Result Value Ref Range    Glucose 134 (H) 70 - 130 mg/dL   POC Glucose Once   Result Value Ref Range    Glucose 186 (H) 70 - 130 mg/dL   POC Glucose Once   Result Value Ref Range    Glucose 169 (H) 70 - 130 mg/dL   POC Glucose Once   Result Value Ref Range    Glucose 192 (H) 70 - 130 mg/dL   POC Glucose Once   Result Value Ref Range    Glucose 209 (H) 70 - 130 mg/dL   POC Glucose Once   Result Value Ref Range    Glucose 220 (H) 70 - 130 mg/dL   POC Glucose Once   Result Value Ref Range    Glucose 182 (H) 70 - 130 mg/dL   POC Glucose Once   Result Value Ref Range    Glucose 188 (H) 70 - 130 mg/dL   POC Glucose Once   Result Value Ref Range    Glucose 201 (H) 70 - 130 mg/dL   POC Glucose Once   Result Value Ref Range    Glucose 182 (H) 70 - 130 mg/dL   POC Glucose Once   Result  Value Ref Range    Glucose 152 (H) 70 - 130 mg/dL   POC Glucose Once   Result Value Ref Range    Glucose 217 (H) 70 - 130 mg/dL   Type & Screen   Result Value Ref Range    ABO Type B     RH type Positive     Antibody Screen Negative     T&S Expiration Date 10/28/2018 11:59:59 PM    Prepare RBC, 1 Units   Result Value Ref Range    Product Code [id number]     Unit Number [id number]     UNIT  ABO B     UNIT  RH POS     Dispense Status PT     Blood Type BPOS     Blood Expiration Date [id number]     Blood Type Barcode 7300    ABO RH Specimen Verification   Result Value Ref Range    ABO Type B     RH type Positive    Prepare RBC, 2 Units   Result Value Ref Range    Product Code B7928P63     Unit Number [id number]     UNIT  ABO O     UNIT  RH POS     Dispense Status PT     Blood Type OPOS     Blood Expiration Date 201811142359     Blood Type Barcode 5100     Product Code V6969D64     Unit Number [id number]     UNIT  ABO O     UNIT  RH POS     Dispense Status PT     Blood Type OPOS     Blood Expiration Date 201811142359     Blood Type Barcode 5100            Joya was seen today for follow-up.    Diagnoses and all orders for this visit:    Hospital discharge follow-up    Acute GI bleeding- cont PPI.  Pt restarted eliquis yesterday per d/c recommendations.  -     Comprehensive Metabolic Panel  -     CBC & Differential    Essential hypertension    Orthostatic hypotension  -     Comprehensive Metabolic Panel  -     CBC & Differential    Type 2 diabetes mellitus with diabetic neuropathy, with long-term current use of insulin (CMS/[facility])    CKD stage 3 due to type 2 diabetes mellitus (CMS/[facility])  -     Comprehensive Metabolic Panel  -     CBC & Differential      Multiple med changes.  Current outpatient and discharge medications have been reconciled for the patient.  Reviewed by: Chari Mercado MD      Outpatient Medications Prior to Visit   Medication Sig Dispense Refill   • ACCU-CHEK FASTCLIX LANCETS misc  "TEST 3-4 TIMES DAILY AS DIRECTED 400 each 3   • ACCU-CHEK SMARTVIEW test strip TEST blood sugar three times daily OR AS DIRECTED 300 each 3   • acetaminophen (TYLENOL) 325 MG tablet Take 2 tablets by mouth Every 6 (Six) Hours As Needed for Mild Pain . OTC product 40 tablet 0   • Alcohol Swabs (B-D SINGLE USE SWABS REGULAR) pads      • atorvastatin (LIPITOR) 10 MG tablet Take one (1) tablet orally (by mouth) once daily 90 tablet 2   • Blood Glucose Monitoring Suppl (ACCU-CHEK KENNETH SMARTVIEW) w/Device kit TEST blood sugar three times daily or as directed 1 kit 0   • DULoxetine (CYMBALTA) 30 MG capsule Take 30 mg by mouth Daily.     • Elastic Bandages & Supports (CARPAL TUNNEL WRIST STABILIZER) misc 1 each Daily. 1 each 0   • ELIQUIS 2.5 MG tablet tablet Take 1 tablet by mouth Every 12 (Twelve) Hours. Resume 11/5/18 60 tablet    • furosemide (LASIX) 20 MG tablet Take 1 tablet by mouth Daily.     • insulin aspart (NOVOLOG FLEXPEN) 100 UNIT/ML solution pen-injector sc pen DO NOT RESUME UNTIL TOLERATING REGULAR DIET AGAIN 15 mL 0   • Insulin Degludec (TRESIBA FLEXTOUCH) 200 UNIT/ML solution pen-injector Inject 50 Units under the skin into the appropriate area as directed Daily. 3 pen 11   • levothyroxine (SYNTHROID) 88 MCG tablet Take 1 tablet by mouth Daily. 30 tablet 3   • midodrine (PROAMATINE) 2.5 MG tablet Take 1 tablet by mouth 2 (Two) Times a Day. 90 tablet 1   • Needle, Disp, (BD DISP NEEDLES) 30G X 1/2\" misc To be used 3 times daily with Novolog Flexpen. 100 each 5   • nystatin (MYCOSTATIN) 048258 UNIT/GM powder Apply  topically 3 (Three) Times a Day. 56.7 g 1   • O2 (OXYGEN) Inhale 2 L/min Every Night.     • omeprazole (priLOSEC) 40 MG capsule Take one (1) capsule orally (by mouth) once daily 90 capsule 1   • promethazine (PHENERGAN) 12.5 MG tablet 1/2 to 1 tablet every 6 hours as needed for nausea 20 tablet 0   • ULTICARE MICRO PEN NEEDLES 32G X 4 MM misc      • ULTICARE MINI PEN NEEDLES 31G X 6 MM misc USE TO " INJECT INSULINS 4 TIMES DAILY AS DIRECTED 100 each 10   • ULTICARE PEN NEEDLES 29G X 12MM misc      • lactobacillus acidophilus (RISAQUAD) capsule capsule Take 1 capsule by mouth Daily. 30 capsule 0   • Cyanocobalamin (VITAMELTS ENERGY VITAMIN B-12) 1500 MCG tablet dispersible Take 1 tablet by mouth Every Morning.       No facility-administered medications prior to visit.      No orders of the defined types were placed in this encounter.    [unfilled]  Medications Discontinued During This Encounter   Medication Reason   • Cyanocobalamin (VITAMELTS ENERGY VITAMIN B-12) 1500 MCG tablet dispersible *Therapy completed         Return in about 2 weeks (around 11/20/2018) for Recheck.

## 2018-11-08 RX ORDER — APIXABAN 2.5 MG/1
TABLET, FILM COATED ORAL
Qty: 60 TABLET | Refills: 1 | Status: SHIPPED | OUTPATIENT
Start: 2018-11-08 | End: 2018-11-25 | Stop reason: HOSPADM

## 2018-11-09 DIAGNOSIS — R35.0 URINARY FREQUENCY: Primary | ICD-10-CM

## 2018-11-14 ENCOUNTER — TELEPHONE (OUTPATIENT)
Dept: ORTHOPEDIC SURGERY | Facility: CLINIC | Age: 78
End: 2018-11-14

## 2018-11-14 NOTE — TELEPHONE ENCOUNTER
I have called and left messages for patient several times in Oct (10/1/18, and 10/10/18) to call back about scheduling L TKA surgery. Have never heard back. Will wait for patient to call me.

## 2018-11-16 ENCOUNTER — READMISSION MANAGEMENT (OUTPATIENT)
Dept: CALL CENTER | Facility: HOSPITAL | Age: 78
End: 2018-11-16

## 2018-11-16 ENCOUNTER — TELEPHONE (OUTPATIENT)
Dept: SOCIAL WORK | Facility: HOSPITAL | Age: 78
End: 2018-11-16

## 2018-11-16 NOTE — TELEPHONE ENCOUNTER
Received email from call center. Spoke with Heydi @ Dr Mercado's office. appt moved to Monday 11/19/18 @ 4pm. Spoke with Feli @ [facility] and PT vestibular appt that was cancelled was rescheduled for 11/28/18 @ 2:15 pm. Feli stated if they have an earlier appt she will call the patient. Spoke with patient via telephone and she states her son Isaac will be able to transport on Monday. She was able to write down both appts. She verbalized understanding of need to keep both appts especially the vestibular appt. She was very grateful for assistance with appts.

## 2018-11-16 NOTE — OUTREACH NOTE
Medical Week 2 Survey      Responses   Facility patient discharged from?  [facility]   Does the patient have one of the following disease processes/diagnoses(primary or secondary)?  Other   Week 2 attempt successful?  Yes   Call start time  1235   Discharge diagnosis  s/p colonoscopy, ACute blood loss on chronic macrocytic anemia due to Lower GI bleed, abdominal pain with chronic nausea, GERD, acute E.Coli UTI, r/p lingular pneumonia vs. atelectasis DM II, HTN   Call end time  1253   Medication alerts for this patient  pt asking for antivert    Meds reviewed with patient/caregiver?  Yes   Is the patient having any side effects they believe may be caused by any medication additions or changes?  No   Does the patient have all medications ordered at discharge?  Yes   Is the patient taking all medications as directed (includes completed medication regime)?  Yes   Comments regarding appointments  Sees Dr Burnham next week   Does the patient have a primary care provider?   Yes   Has the patient kept scheduled appointments due by today?  Yes   What is the Home health agency?   Pt has PT coming in to see her   Comments  Still feeling dizzy and not able to walk arounf her house alot. Asking for HH nurse to help her in the home.   What is the patient's perception of their health status since discharge?  Worsening   Is the patient/caregiver able to teach back signs and symptoms related to disease process for when to call PCP?  Yes   Is the patient/caregiver able to teach back signs and symptoms related to disease process for when to call 911?  Yes   Is the patient/caregiver able to teach back the hierarchy of who to call/visit for symptoms/problems? PCP, Specialist, Home health nurse, Urgent Care, ED, 911  Yes   Additional teach back comments  I have told pt to call Dr burnham's office and check to see if she can obtain antivert and will have CM f/u for review.   Week 2 Call Completed?  Yes          Jessica Strange RN

## 2018-11-19 ENCOUNTER — HOSPITAL ENCOUNTER (EMERGENCY)
Facility: HOSPITAL | Age: 78
Discharge: ANOTHER HOSPITAL | End: 2018-11-19
Attending: EMERGENCY MEDICINE | Admitting: EMERGENCY MEDICINE

## 2018-11-19 ENCOUNTER — HOSPITAL ENCOUNTER (OUTPATIENT)
Facility: HOSPITAL | Age: 78
Setting detail: OBSERVATION
Discharge: HOME HEALTH | End: 2018-11-25
Attending: HOSPITALIST | Admitting: NURSE PRACTITIONER

## 2018-11-19 ENCOUNTER — OFFICE VISIT (OUTPATIENT)
Dept: INTERNAL MEDICINE | Facility: CLINIC | Age: 78
End: 2018-11-19

## 2018-11-19 VITALS
TEMPERATURE: 98.1 F | OXYGEN SATURATION: 97 % | WEIGHT: 204.19 LBS | HEART RATE: 84 BPM | HEIGHT: 62 IN | SYSTOLIC BLOOD PRESSURE: 120 MMHG | RESPIRATION RATE: 14 BRPM | DIASTOLIC BLOOD PRESSURE: 62 MMHG | BODY MASS INDEX: 37.58 KG/M2

## 2018-11-19 VITALS
OXYGEN SATURATION: 94 % | HEART RATE: 91 BPM | SYSTOLIC BLOOD PRESSURE: 90 MMHG | RESPIRATION RATE: 16 BRPM | DIASTOLIC BLOOD PRESSURE: 52 MMHG

## 2018-11-19 DIAGNOSIS — Z79.01 CURRENT USE OF LONG TERM ANTICOAGULATION: ICD-10-CM

## 2018-11-19 DIAGNOSIS — D50.0 BLOOD LOSS ANEMIA: Primary | ICD-10-CM

## 2018-11-19 DIAGNOSIS — Z79.4 TYPE 2 DIABETES MELLITUS WITH DIABETIC NEUROPATHY, WITH LONG-TERM CURRENT USE OF INSULIN (HCC): ICD-10-CM

## 2018-11-19 DIAGNOSIS — K92.2 ACUTE GI BLEEDING: ICD-10-CM

## 2018-11-19 DIAGNOSIS — D64.9 ACUTE ON CHRONIC ANEMIA: Primary | ICD-10-CM

## 2018-11-19 DIAGNOSIS — Z74.09 IMPAIRED FUNCTIONAL MOBILITY AND ACTIVITY TOLERANCE: Primary | ICD-10-CM

## 2018-11-19 DIAGNOSIS — R42 DIZZINESS: ICD-10-CM

## 2018-11-19 DIAGNOSIS — E11.40 TYPE 2 DIABETES MELLITUS WITH DIABETIC NEUROPATHY, WITH LONG-TERM CURRENT USE OF INSULIN (HCC): ICD-10-CM

## 2018-11-19 PROBLEM — D62 ACUTE BLOOD LOSS ANEMIA: Status: ACTIVE | Noted: 2018-11-19

## 2018-11-19 LAB
ABO GROUP BLD: NORMAL
ALBUMIN SERPL-MCNC: 3.5 G/DL (ref 3.5–5.2)
ALBUMIN/GLOB SERPL: 0.8 G/DL
ALP SERPL-CCNC: 99 U/L (ref 40–129)
ALT SERPL W P-5'-P-CCNC: 8 U/L (ref 5–33)
ANION GAP SERPL CALCULATED.3IONS-SCNC: 10.9 MMOL/L
AST SERPL-CCNC: 9 U/L (ref 5–32)
BASOPHILS # BLD AUTO: 0.09 10*3/MM3 (ref 0–0.2)
BASOPHILS NFR BLD AUTO: 1.4 % (ref 0–2)
BILIRUB SERPL-MCNC: 0.4 MG/DL (ref 0.2–1.2)
BILIRUB UR QL STRIP: NEGATIVE
BLD GP AB SCN SERPL QL: NEGATIVE
BUN BLD-MCNC: 25 MG/DL (ref 8–23)
BUN/CREAT SERPL: 14.4 (ref 7–25)
CALCIUM SPEC-SCNC: 8.7 MG/DL (ref 8.8–10.5)
CHLORIDE SERPL-SCNC: 103 MMOL/L (ref 98–107)
CLARITY UR: CLEAR
CO2 SERPL-SCNC: 28.1 MMOL/L (ref 22–29)
COLOR UR: YELLOW
CREAT BLD-MCNC: 1.74 MG/DL (ref 0.57–1)
DEPRECATED RDW RBC AUTO: 66.7 FL (ref 37–54)
EOSINOPHIL # BLD AUTO: 0.37 10*3/MM3 (ref 0.1–0.3)
EOSINOPHIL NFR BLD AUTO: 5.7 % (ref 0–4)
ERYTHROCYTE [DISTWIDTH] IN BLOOD BY AUTOMATED COUNT: 18.8 % (ref 11.5–14.5)
GFR SERPL CREATININE-BSD FRML MDRD: 28 ML/MIN/1.73
GLOBULIN UR ELPH-MCNC: 4.2 GM/DL
GLUCOSE BLD-MCNC: 299 MG/DL (ref 65–99)
GLUCOSE BLDC GLUCOMTR-MCNC: 320 MG/DL (ref 70–130)
GLUCOSE BLDC GLUCOMTR-MCNC: 320 MG/DL (ref 70–130)
GLUCOSE UR STRIP-MCNC: NEGATIVE MG/DL
HCT VFR BLD AUTO: 21.5 % (ref 37–47)
HGB BLD-MCNC: 6.9 G/DL (ref 12–16)
HGB BLDA-MCNC: 5.5 G/DL
HGB UR QL STRIP.AUTO: NEGATIVE
IMM GRANULOCYTES # BLD: 0.11 10*3/MM3 (ref 0–0.03)
IMM GRANULOCYTES NFR BLD: 1.7 % (ref 0–0.5)
INR PPP: 1.32 (ref 0.9–1.1)
KETONES UR QL STRIP: NEGATIVE
LEUKOCYTE ESTERASE UR QL STRIP.AUTO: NEGATIVE
LYMPHOCYTES # BLD AUTO: 2.12 10*3/MM3 (ref 0.6–4.8)
LYMPHOCYTES NFR BLD AUTO: 32.7 % (ref 20–45)
MCH RBC QN AUTO: 32.5 PG (ref 27–31)
MCHC RBC AUTO-ENTMCNC: 32.1 G/DL (ref 31–37)
MCV RBC AUTO: 101.4 FL (ref 81–99)
MONOCYTES # BLD AUTO: 0.27 10*3/MM3 (ref 0–1)
MONOCYTES NFR BLD AUTO: 4.2 % (ref 3–8)
NEUTROPHILS # BLD AUTO: 3.52 10*3/MM3 (ref 1.5–8.3)
NEUTROPHILS NFR BLD AUTO: 54.3 % (ref 45–70)
NITRITE UR QL STRIP: NEGATIVE
NRBC BLD MANUAL-RTO: 0 /100 WBC (ref 0–0)
PH UR STRIP.AUTO: 5.5 [PH] (ref 4.5–8)
PLATELET # BLD AUTO: 492 10*3/MM3 (ref 140–500)
PMV BLD AUTO: 11.3 FL (ref 7.4–10.4)
POTASSIUM BLD-SCNC: 4.7 MMOL/L (ref 3.5–5.2)
PROT SERPL-MCNC: 7.7 G/DL (ref 6–8.5)
PROT UR QL STRIP: NEGATIVE
PROTHROMBIN TIME: 16.5 SECONDS (ref 12.1–15)
RBC # BLD AUTO: 2.12 10*6/MM3 (ref 4.2–5.4)
RH BLD: POSITIVE
SODIUM BLD-SCNC: 142 MMOL/L (ref 136–145)
SP GR UR STRIP: 1.01 (ref 1–1.03)
T&S EXPIRATION DATE: NORMAL
TROPONIN T SERPL-MCNC: <0.01 NG/ML (ref 0–0.03)
UROBILINOGEN UR QL STRIP: NORMAL
WBC NRBC COR # BLD: 6.48 10*3/MM3 (ref 4.8–10.8)

## 2018-11-19 PROCEDURE — 86900 BLOOD TYPING SEROLOGIC ABO: CPT

## 2018-11-19 PROCEDURE — 85025 COMPLETE CBC W/AUTO DIFF WBC: CPT | Performed by: NURSE PRACTITIONER

## 2018-11-19 PROCEDURE — 99215 OFFICE O/P EST HI 40 MIN: CPT | Performed by: INTERNAL MEDICINE

## 2018-11-19 PROCEDURE — G0378 HOSPITAL OBSERVATION PER HR: HCPCS

## 2018-11-19 PROCEDURE — 86850 RBC ANTIBODY SCREEN: CPT | Performed by: PHYSICIAN ASSISTANT

## 2018-11-19 PROCEDURE — 86900 BLOOD TYPING SEROLOGIC ABO: CPT | Performed by: PHYSICIAN ASSISTANT

## 2018-11-19 PROCEDURE — 81003 URINALYSIS AUTO W/O SCOPE: CPT | Performed by: PHYSICIAN ASSISTANT

## 2018-11-19 PROCEDURE — 99284 EMERGENCY DEPT VISIT MOD MDM: CPT

## 2018-11-19 PROCEDURE — 93010 ELECTROCARDIOGRAM REPORT: CPT | Performed by: INTERNAL MEDICINE

## 2018-11-19 PROCEDURE — 82607 VITAMIN B-12: CPT | Performed by: NURSE PRACTITIONER

## 2018-11-19 PROCEDURE — 82962 GLUCOSE BLOOD TEST: CPT

## 2018-11-19 PROCEDURE — 63710000001 DIPHENHYDRAMINE PER 50 MG

## 2018-11-19 PROCEDURE — 83540 ASSAY OF IRON: CPT | Performed by: NURSE PRACTITIONER

## 2018-11-19 PROCEDURE — 86901 BLOOD TYPING SEROLOGIC RH(D): CPT | Performed by: PHYSICIAN ASSISTANT

## 2018-11-19 PROCEDURE — 36430 TRANSFUSION BLD/BLD COMPNT: CPT

## 2018-11-19 PROCEDURE — 82746 ASSAY OF FOLIC ACID SERUM: CPT | Performed by: NURSE PRACTITIONER

## 2018-11-19 PROCEDURE — 84484 ASSAY OF TROPONIN QUANT: CPT | Performed by: PHYSICIAN ASSISTANT

## 2018-11-19 PROCEDURE — 93005 ELECTROCARDIOGRAM TRACING: CPT | Performed by: PHYSICIAN ASSISTANT

## 2018-11-19 PROCEDURE — P9016 RBC LEUKOCYTES REDUCED: HCPCS

## 2018-11-19 PROCEDURE — 99284 EMERGENCY DEPT VISIT MOD MDM: CPT | Performed by: PHYSICIAN ASSISTANT

## 2018-11-19 PROCEDURE — 85025 COMPLETE CBC W/AUTO DIFF WBC: CPT | Performed by: PHYSICIAN ASSISTANT

## 2018-11-19 PROCEDURE — 80053 COMPREHEN METABOLIC PANEL: CPT | Performed by: PHYSICIAN ASSISTANT

## 2018-11-19 PROCEDURE — 96374 THER/PROPH/DIAG INJ IV PUSH: CPT

## 2018-11-19 PROCEDURE — 85018 HEMOGLOBIN: CPT | Performed by: INTERNAL MEDICINE

## 2018-11-19 PROCEDURE — P9612 CATHETERIZE FOR URINE SPEC: HCPCS

## 2018-11-19 PROCEDURE — 85610 PROTHROMBIN TIME: CPT | Performed by: PHYSICIAN ASSISTANT

## 2018-11-19 PROCEDURE — 86923 COMPATIBILITY TEST ELECTRIC: CPT

## 2018-11-19 PROCEDURE — 84466 ASSAY OF TRANSFERRIN: CPT | Performed by: NURSE PRACTITIONER

## 2018-11-19 RX ORDER — NITROGLYCERIN 0.4 MG/1
0.4 TABLET SUBLINGUAL
Status: DISCONTINUED | OUTPATIENT
Start: 2018-11-19 | End: 2018-11-25 | Stop reason: HOSPADM

## 2018-11-19 RX ORDER — ONDANSETRON 2 MG/ML
4 INJECTION INTRAMUSCULAR; INTRAVENOUS EVERY 6 HOURS PRN
Status: DISCONTINUED | OUTPATIENT
Start: 2018-11-19 | End: 2018-11-25 | Stop reason: HOSPADM

## 2018-11-19 RX ORDER — SODIUM CHLORIDE 9 MG/ML
INJECTION, SOLUTION INTRAVENOUS
Status: DISCONTINUED
Start: 2018-11-19 | End: 2018-11-19 | Stop reason: HOSPADM

## 2018-11-19 RX ORDER — SODIUM CHLORIDE 0.9 % (FLUSH) 0.9 %
1-10 SYRINGE (ML) INJECTION AS NEEDED
Status: DISCONTINUED | OUTPATIENT
Start: 2018-11-19 | End: 2018-11-25 | Stop reason: HOSPADM

## 2018-11-19 RX ORDER — GABAPENTIN 400 MG/1
400 CAPSULE ORAL 3 TIMES DAILY
COMMUNITY
End: 2018-11-30 | Stop reason: SDUPTHER

## 2018-11-19 RX ORDER — SODIUM CHLORIDE 9 MG/ML
100 INJECTION, SOLUTION INTRAVENOUS CONTINUOUS
Status: DISCONTINUED | OUTPATIENT
Start: 2018-11-20 | End: 2018-11-20

## 2018-11-19 RX ORDER — ONDANSETRON 4 MG/1
4 TABLET, ORALLY DISINTEGRATING ORAL EVERY 6 HOURS PRN
Status: DISCONTINUED | OUTPATIENT
Start: 2018-11-19 | End: 2018-11-25 | Stop reason: HOSPADM

## 2018-11-19 RX ORDER — SODIUM CHLORIDE 0.9 % (FLUSH) 0.9 %
10 SYRINGE (ML) INJECTION AS NEEDED
Status: DISCONTINUED | OUTPATIENT
Start: 2018-11-19 | End: 2018-11-19 | Stop reason: HOSPADM

## 2018-11-19 RX ORDER — SODIUM CHLORIDE 0.9 % (FLUSH) 0.9 %
3 SYRINGE (ML) INJECTION EVERY 12 HOURS SCHEDULED
Status: DISCONTINUED | OUTPATIENT
Start: 2018-11-20 | End: 2018-11-25 | Stop reason: HOSPADM

## 2018-11-19 RX ORDER — ONDANSETRON 4 MG/1
4 TABLET, FILM COATED ORAL EVERY 6 HOURS PRN
Status: DISCONTINUED | OUTPATIENT
Start: 2018-11-19 | End: 2018-11-25 | Stop reason: HOSPADM

## 2018-11-19 RX ORDER — ACETAMINOPHEN 325 MG/1
650 TABLET ORAL EVERY 4 HOURS PRN
Status: DISCONTINUED | OUTPATIENT
Start: 2018-11-19 | End: 2018-11-25 | Stop reason: HOSPADM

## 2018-11-19 RX ORDER — DIPHENHYDRAMINE HCL 50 MG
CAPSULE ORAL
Status: COMPLETED
Start: 2018-11-19 | End: 2018-11-19

## 2018-11-19 RX ORDER — PANTOPRAZOLE SODIUM 40 MG/10ML
40 INJECTION, POWDER, LYOPHILIZED, FOR SOLUTION INTRAVENOUS ONCE
Status: COMPLETED | OUTPATIENT
Start: 2018-11-19 | End: 2018-11-19

## 2018-11-19 RX ADMIN — PANTOPRAZOLE SODIUM 40 MG: 40 INJECTION, POWDER, FOR SOLUTION INTRAVENOUS at 21:07

## 2018-11-19 RX ADMIN — DIPHENHYDRAMINE HYDROCHLORIDE 50 MG: 50 CAPSULE ORAL at 20:07

## 2018-11-19 NOTE — PROGRESS NOTES
Joya Singh is a 78 y.o. female, who presents with a chief complaint of   Chief Complaint   Patient presents with   • Dizziness       HPI   The pt is here bc of dizziness that is worsening.  She didn't want to weigh today bc she doesn't feel steady on her feet.  .  Her bp is very low today.  She says she took all of her regular meds.  She recently had a GI bleed w hgb down to 7.  She was given PRBC tx x 5 units.  She denies dark stools.  She has been having diarrhea soon.  When she goes to urinate she also has a bm.       DM - glucoses have been higher lately.  The other day she says her glucose was 236.  Today level was 136 when she checked.      Hx UTI's - She denies dysuria.      The following portions of the patient's history were reviewed and updated as appropriate: allergies, current medications, past family history, past medical history, past social history, past surgical history and problem list.    Allergies: Lisinopril; Morphine and related; Baclofen; Codeine; Morphine; and Penicillins    Review of Systems   Constitutional: Positive for fatigue.   HENT: Negative.    Eyes: Negative.    Respiratory: Negative.    Cardiovascular: Negative.    Gastrointestinal: Negative.  Negative for blood in stool.   Endocrine: Negative.    Genitourinary: Negative.    Musculoskeletal: Negative.    Skin: Negative.    Allergic/Immunologic: Negative.    Neurological: Positive for dizziness and light-headedness. Negative for syncope.   Hematological: Negative.    Psychiatric/Behavioral: Negative.    All other systems reviewed and are negative.            Wt Readings from Last 3 Encounters:   10/30/18 90 kg (198 lb 6.6 oz)   10/16/18 94.1 kg (207 lb 8 oz)   10/04/18 92.1 kg (203 lb)   bp recheck with pt sitting 122/58 right, 118/60 left    Temp Readings from Last 3 Encounters:   11/06/18 97.9 °F (36.6 °C)   10/30/18 98.4 °F (36.9 °C) (Oral)   10/16/18 98.4 °F (36.9 °C) (Oral)     BP Readings from Last 3 Encounters:   11/19/18  90/52   11/06/18 140/80   10/30/18 172/74     Pulse Readings from Last 3 Encounters:   11/19/18 91   11/06/18 74   10/30/18 88     There is no height or weight on file to calculate BMI.  @LASTSAO2(3)@    Physical Exam   Constitutional: She is oriented to person, place, and time.   Pt slightly pale.  Non-toxic but looks like she doesn't feel well.  Sitting in wheel chair.    HENT:   Head: Normocephalic and atraumatic.   Right Ear: External ear normal.   Left Ear: External ear normal.   Nose: Nose normal.   Mouth/Throat: Oropharynx is clear and moist.   Eyes: Conjunctivae and EOM are normal. Pupils are equal, round, and reactive to light.   Neck: Normal range of motion. Neck supple.   Cardiovascular: Normal rate, regular rhythm, normal heart sounds and intact distal pulses.   Pulmonary/Chest: Effort normal and breath sounds normal. No respiratory distress. She has no wheezes.   Neurological: She is alert and oriented to person, place, and time.   Skin: Skin is warm and dry.   Psychiatric: She has a normal mood and affect. Her behavior is normal. Judgment and thought content normal.   Nursing note and vitals reviewed.      Results for orders placed or performed in visit on 11/06/18   Comprehensive Metabolic Panel   Result Value Ref Range    Glucose 102 (H) 65 - 99 mg/dL    BUN 23 8 - 23 mg/dL    Creatinine 1.54 (H) 0.57 - 1.00 mg/dL    eGFR Non African Am 33 (L) >60 mL/min/1.73    eGFR African Am 40 (L) >60 mL/min/1.73    BUN/Creatinine Ratio 14.9 7.0 - 25.0    Sodium 142 136 - 145 mmol/L    Potassium 4.6 3.5 - 5.2 mmol/L    Chloride 103 98 - 107 mmol/L    Total CO2 29.5 (H) 22.0 - 29.0 mmol/L    Calcium 9.1 8.8 - 10.5 mg/dL    Total Protein 6.9 6.0 - 8.5 g/dL    Albumin 4.10 3.50 - 5.20 g/dL    Globulin 2.8 gm/dL    A/G Ratio 1.5 g/dL    Total Bilirubin 0.6 0.2 - 1.2 mg/dL    Alkaline Phosphatase 116 40 - 129 U/L    AST (SGOT) 11 5 - 32 U/L    ALT (SGPT) 10 5 - 33 U/L   CBC & Differential   Result Value Ref Range     WBC 5.16 4.80 - 10.80 10*3/mm3    RBC 2.74 (L) 4.20 - 5.40 10*6/mm3    Hemoglobin 8.8 (L) 12.0 - 16.0 g/dL    Hematocrit 27.2 (L) 37.0 - 47.0 %    MCV 99.3 (H) 81.0 - 99.0 fL    MCH 32.1 (H) 27.0 - 31.0 pg    MCHC 32.4 31.0 - 37.0 g/dL    RDW 18.1 (H) 11.5 - 14.5 %    Platelets 352 140 - 500 10*3/mm3    Neutrophil Rel % 49.5 45.0 - 70.0 %    Lymphocyte Rel % 35.3 20.0 - 45.0 %    Monocyte Rel % 4.7 3.0 - 8.0 %    Eosinophil Rel % 7.6 (H) 0.0 - 4.0 %    Basophil Rel % 1.9 0.0 - 2.0 %    Neutrophils Absolute 2.56 1.50 - 8.30 10*3/mm3    Lymphocytes Absolute 1.82 0.60 - 4.80 10*3/mm3    Monocytes Absolute 0.24 0.00 - 1.00 10*3/mm3    Eosinophils Absolute 0.39 (H) 0.10 - 0.30 10*3/mm3    Basophils Absolute 0.10 0.00 - 0.20 10*3/mm3    Immature Granulocyte Rel % 1.0 (H) 0.0 - 0.5 %    Immature Grans Absolute 0.05 (H) 0.00 - 0.03 10*3/mm3    nRBC 0.0 0.0 - 0.0 /100 WBC     hgb in office 4.6 and repeat 5.5.        Joya was seen today for dizziness.    Diagnoses and all orders for this visit:    Acute on chronic anemia  -     POC Hemoglobin    Acute GI bleeding  -     POC Hemoglobin    Type 2 diabetes mellitus with diabetic neuropathy, with long-term current use of insulin (CMS/[facility])    Dizziness  -     POC Hemoglobin  -     Cancel: POC Urinalysis Dipstick, Automated      40 min spent in patient care with >50% of time spent in counseling about above issues and worsening anemia.  Pt here with worsening dizziness.  She had a GI bleed last month that required prbc transfusion x 5.  D/c hgb was 8.  She denies blood in her stool but hgb in office 4.6 and repeat 5.5.  Pt unable to leave urine specimen.  I discussed case with dr. Lee in the ER.  Pt sent to ER for further evaluation.     Outpatient Medications Prior to Visit   Medication Sig Dispense Refill   • ACCU-CHEK FASTCLIX LANCETS misc TEST 3-4 TIMES DAILY AS DIRECTED 400 each 3   • ACCU-CHEK SMARTVIEW test strip TEST blood sugar three times daily OR AS DIRECTED 300 each 3  "  • acetaminophen (TYLENOL) 325 MG tablet Take 2 tablets by mouth Every 6 (Six) Hours As Needed for Mild Pain . OTC product 40 tablet 0   • Alcohol Swabs (B-D SINGLE USE SWABS REGULAR) pads      • atorvastatin (LIPITOR) 10 MG tablet Take one (1) tablet orally (by mouth) once daily 90 tablet 2   • Blood Glucose Monitoring Suppl (ACCU-CHEK KENNETH SMARTVIEW) w/Device kit TEST blood sugar three times daily or as directed 1 kit 0   • DULoxetine (CYMBALTA) 30 MG capsule Take 30 mg by mouth Daily.     • Elastic Bandages & Supports (CARPAL TUNNEL WRIST STABILIZER) misc 1 each Daily. 1 each 0   • ELIQUIS 2.5 MG tablet tablet TAKE ONE (1) TABLET ORALLY (BY MOUTH) TWICE DAILY 60 tablet 1   • furosemide (LASIX) 20 MG tablet Take 1 tablet by mouth Daily.     • insulin aspart (NOVOLOG FLEXPEN) 100 UNIT/ML solution pen-injector sc pen DO NOT RESUME UNTIL TOLERATING REGULAR DIET AGAIN 15 mL 0   • Insulin Degludec (TRESIBA FLEXTOUCH) 200 UNIT/ML solution pen-injector Inject 50 Units under the skin into the appropriate area as directed Daily. 3 pen 11   • lactobacillus acidophilus (RISAQUAD) capsule capsule Take 1 capsule by mouth Daily. 30 capsule 0   • levothyroxine (SYNTHROID) 88 MCG tablet Take 1 tablet by mouth Daily. 30 tablet 3   • midodrine (PROAMATINE) 2.5 MG tablet Take 1 tablet by mouth 2 (Two) Times a Day. 90 tablet 1   • Needle, Disp, (BD DISP NEEDLES) 30G X 1/2\" misc To be used 3 times daily with Novolog Flexpen. 100 each 5   • nystatin (MYCOSTATIN) 418099 UNIT/GM powder Apply  topically 3 (Three) Times a Day. 56.7 g 1   • O2 (OXYGEN) Inhale 2 L/min Every Night.     • omeprazole (priLOSEC) 40 MG capsule Take one (1) capsule orally (by mouth) once daily 90 capsule 1   • promethazine (PHENERGAN) 12.5 MG tablet 1/2 to 1 tablet every 6 hours as needed for nausea 20 tablet 0   • ULTICARE MICRO PEN NEEDLES 32G X 4 MM misc      • ULTICARE MINI PEN NEEDLES 31G X 6 MM misc USE TO INJECT INSULINS 4 TIMES DAILY AS DIRECTED 100 each " 10   • ULTICARE PEN NEEDLES 29G X 12MM misc      • gabapentin (NEURONTIN) 300 MG capsule Take 300 mg by mouth.       No facility-administered medications prior to visit.      No orders of the defined types were placed in this encounter.    [unfilled]  There are no discontinued medications.      Return for Recheck after ER evaluation.

## 2018-11-19 NOTE — ED PROVIDER NOTES
Subjective   History of Present Illness  History of Present Illness    Chief complaint: Low hemoglobin    Location: generalized    Quality/Severity:  Hgb 5.5    Timing/Duration: unknown    Modifying Factors: none    Associated Symptoms: Positive mild shortness of breath.  Denies chest pain.  Positive lightheadedness.  Denies headache.  Denies fevers or chills.  Denies cough.  Positive generalized abdominal pain.  Positive recent diarrhea.  Denies melena, hematochezia or bright red blood per rectum.    Narrative: 78-year-old female presents from Dr. Mercado's office for dizziness that was worsening.  She recently had a lower GI bleed, suspected 2/2 AVM's with EGD/colonoscopy without acute findings, with a hemoglobin down to 7 and was given 5 units of PRBCs.  On discharge 10/30/18, her hemoglobin was 8.  Her hemoglobin in their office was 4.6.  They repeated it and it came back at 5.5.  She does take Eliquis for history of A. fib and stroke    Echo 10/4/18:  · Left ventricular systolic function is normal.  · Calculated right ventricular systolic pressure from tricuspid regurgitation is 16 mmHg.  · Left ventricular diastolic dysfunction (grade I) consistent with impaired relaxation.  · Calculated EF = 67%.      Review of Systems  General: Denies fevers or chills.  Denies any weakness or fatigue.  Denies any weight loss or weight gain.  SKIN: Denies any rashes lesions or ulcers.  Denies color change.  ENT: Denies sore throat or rhinorrhea.  Denies ear pain.    EYES: Denies any blurred vision.  Denies any change in vision.  Denies any photophobia.  Denies any vision loss.  LUNGS: Denies any shortness of breath or wheezing.  Denies any cough.  Denies any hemoptysis.  CARDIAC: Denies any chest pain.  Denies palpitations.  Denies syncope.  Denies any edema  ABD: + abdominal pain.  Denies any nausea or vomiting or diarrhea.  Denies any rectal bleeding.  Denies constipation  : Denies any dysuria, urgency, frequency or  hematuria.  Denies discharge.  Denies flank pain.  NEURO: Denies any focal weakness.  Denies headache.  Denies seizures.  Denies changes in speech or difficulty walking.  ENDOCRINE: Denies polydipsia and polyuria  M/S: Denies arthralgias, back pain, myalgias or neck pain  HEME/LYMPH: Negative for adenopathy. Does not bruise/bleed easily.   PSYCH: Negative for suicidal ideas. Denies anxiety or depression  review was performed in addition to those in the above all other reviews are negative.      Past Medical History:   Diagnosis Date   • Allergic rhinitis    • Anxiety    • Arthritis    • Bell's palsy    • CKD (chronic kidney disease) stage 3, GFR 30-59 ml/min (CMS/[facility])    • Depression    • Diabetes mellitus (CMS/[facility])     LAST A1C 6   • Diabetic gastroparesis (CMS/[facility]) 2/19/2016   • GERD (gastroesophageal reflux disease)    • GI bleed    • H/O blood clots     LEFT LEG 7 OR 8 YEARS AGO   • Hyperlipidemia    • Hypertension    • Hypothyroidism    • Normal coronary arteries     by cath 2013   • AARON (obstructive sleep apnea)     DOESNT WEAR REGULARLY   • PONV (postoperative nausea and vomiting)    • Skin cancer    • Stroke (CMS/[facility])    • TIA (transient ischemic attack)     LAST TIA JULY 2017       Allergies   Allergen Reactions   • Lisinopril Cough   • Morphine And Related    • Baclofen Anxiety     Panic attack, nightmares   • Codeine Itching and Rash   • Morphine Rash   • Penicillins Rash       Past Surgical History:   Procedure Laterality Date   • BACK SURGERY      HARDWARE   • CHOLECYSTECTOMY      OPEN   • COLONOSCOPY  2011    due for repeat in 2021   • HYSTERECTOMY      PARTIAL    • NECK SURGERY     • SPINE SURGERY     • UPPER GASTROINTESTINAL ENDOSCOPY  2014    gastritis.  done by dr. hastings       Family History   Problem Relation Age of Onset   • Lupus Mother    • Heart failure Mother 59   • Heart disease Other    • Hypertension Other    • Heart attack Father        Social History     Socioeconomic History   •  Marital status:      Spouse name: Not on file   • Number of children: Not on file   • Years of education: Not on file   • Highest education level: Not on file   Tobacco Use   • Smoking status: Never Smoker   • Smokeless tobacco: Never Used   • Tobacco comment: CAFFEINE USE: NONE   Substance and Sexual Activity   • Alcohol use: No   • Drug use: No   • Sexual activity: Defer     Comment: EXERCISE - RARELY       Current Facility-Administered Medications:   •  Insert peripheral IV, , , Once **AND** sodium chloride 0.9 % flush 10 mL, 10 mL, Intravenous, PRN, Idalmis Quarles, RICKEY    Current Outpatient Medications:   •  acetaminophen (TYLENOL) 325 MG tablet, Take 2 tablets by mouth Every 6 (Six) Hours As Needed for Mild Pain . OTC product, Disp: 40 tablet, Rfl: 0  •  ACCU-CHEK FASTCLIX LANCETS misc, TEST 3-4 TIMES DAILY AS DIRECTED, Disp: 400 each, Rfl: 3  •  ACCU-CHEK SMARTVIEW test strip, TEST blood sugar three times daily OR AS DIRECTED, Disp: 300 each, Rfl: 3  •  Alcohol Swabs (B-D SINGLE USE SWABS REGULAR) pads, , Disp: , Rfl:   •  atorvastatin (LIPITOR) 10 MG tablet, Take one (1) tablet orally (by mouth) once daily, Disp: 90 tablet, Rfl: 2  •  Blood Glucose Monitoring Suppl (ACCU-CHEK KENNETH SMARTVIEW) w/Device kit, TEST blood sugar three times daily or as directed, Disp: 1 kit, Rfl: 0  •  DULoxetine (CYMBALTA) 30 MG capsule, Take 30 mg by mouth Daily., Disp: , Rfl:   •  Elastic Bandages & Supports (CARPAL TUNNEL WRIST STABILIZER) misc, 1 each Daily., Disp: 1 each, Rfl: 0  •  ELIQUIS 2.5 MG tablet tablet, TAKE ONE (1) TABLET ORALLY (BY MOUTH) TWICE DAILY, Disp: 60 tablet, Rfl: 1  •  furosemide (LASIX) 20 MG tablet, Take 1 tablet by mouth Daily., Disp: , Rfl:   •  gabapentin (NEURONTIN) 300 MG capsule, Take 300 mg by mouth., Disp: , Rfl:   •  insulin aspart (NOVOLOG FLEXPEN) 100 UNIT/ML solution pen-injector sc pen, DO NOT RESUME UNTIL TOLERATING REGULAR DIET AGAIN, Disp: 15 mL, Rfl: 0  •  Insulin Degludec  "(TRESIBA FLEXTOUCH) 200 UNIT/ML solution pen-injector, Inject 50 Units under the skin into the appropriate area as directed Daily., Disp: 3 pen, Rfl: 11  •  lactobacillus acidophilus (RISAQUAD) capsule capsule, Take 1 capsule by mouth Daily., Disp: 30 capsule, Rfl: 0  •  levothyroxine (SYNTHROID) 88 MCG tablet, Take 1 tablet by mouth Daily., Disp: 30 tablet, Rfl: 3  •  midodrine (PROAMATINE) 2.5 MG tablet, Take 1 tablet by mouth 2 (Two) Times a Day., Disp: 90 tablet, Rfl: 1  •  Needle, Disp, (BD DISP NEEDLES) 30G X 1/2\" misc, To be used 3 times daily with Novolog Flexpen., Disp: 100 each, Rfl: 5  •  nystatin (MYCOSTATIN) 162279 UNIT/GM powder, Apply  topically 3 (Three) Times a Day., Disp: 56.7 g, Rfl: 1  •  O2 (OXYGEN), Inhale 2 L/min Every Night., Disp: , Rfl:   •  omeprazole (priLOSEC) 40 MG capsule, Take one (1) capsule orally (by mouth) once daily, Disp: 90 capsule, Rfl: 1  •  promethazine (PHENERGAN) 12.5 MG tablet, 1/2 to 1 tablet every 6 hours as needed for nausea, Disp: 20 tablet, Rfl: 0  •  ULTICARE MICRO PEN NEEDLES 32G X 4 MM misc, , Disp: , Rfl:   •  ULTICARE MINI PEN NEEDLES 31G X 6 MM misc, USE TO INJECT INSULINS 4 TIMES DAILY AS DIRECTED, Disp: 100 each, Rfl: 10  •  ULTICARE PEN NEEDLES 29G X 12MM misc, , Disp: , Rfl:         Objective   Physical Exam  Vitals:    11/19/18 1726   BP: 150/67   Pulse: 89   Resp: 18   Temp: 98.1 °F (36.7 °C)   SpO2: 92%       GENERAL: a/o x 4, NAD  SKIN: Warm pink and dry   HEENT:  PERRLA, EOM intact, conjunctiva pale, sclera clear  NECK: supple, no JVD  LUNGS: Clear to auscultation bilaterally without wheezes, rales or rhonchi.  No accessory muscle use and no nasal flaring.  CARDIAC:  Regular rate and rhythm, S1-S2.  No murmurs, rubs or gallops.  No peripheral edema.  Equal pulses bilaterally.  ABDOMEN: Soft, nontender, nondistended.  No guarding or rebound tenderness.  Normal bowel sounds.  Rectal: small external hemorrhoid.  Good rectal tone.  No sig stool in vault.  " Hemoccult negative. Control OK.  MUSCULOSKELETAL: Moves all extremities well.  No deformity.  NEURO: Cranial nerves II through XII grossly intact.  No gross focal deficits.  Alert.  Normal speech and motor.  PSYCH: Normal mood and affect      Procedures           ED Course  ED Course as of Nov 19 1920   Mon Nov 19, 2018 1913 1 unit PRBC ordred Hemoglobin: (!!) 6.9 [KY]      ED Course User Index  [KY] Idalmis Quarles, RICKEY    EKG         EKG time / Interpretation time: 1754 / 1758  Rhythm/Rate: NSR 89, RBBB   AK: 187  QRS, axis: -27   QTc 492  ST and T waves: inversion III, V1,V2, V3 , flattened aVF   Interpreted Contemporaneously by me, independently viewed by me and MD.  Not sig changed compared to prior 10/26/18    Results for orders placed or performed during the hospital encounter of 11/19/18   Comprehensive Metabolic Panel   Result Value Ref Range    Glucose 299 (H) 65 - 99 mg/dL    BUN 25 (H) 8 - 23 mg/dL    Creatinine 1.74 (H) 0.57 - 1.00 mg/dL    Sodium 142 136 - 145 mmol/L    Potassium 4.7 3.5 - 5.2 mmol/L    Chloride 103 98 - 107 mmol/L    CO2 28.1 22.0 - 29.0 mmol/L    Calcium 8.7 (L) 8.8 - 10.5 mg/dL    Total Protein 7.7 6.0 - 8.5 g/dL    Albumin 3.50 3.50 - 5.20 g/dL    ALT (SGPT) 8 5 - 33 U/L    AST (SGOT) 9 5 - 32 U/L    Alkaline Phosphatase 99 40 - 129 U/L    Total Bilirubin 0.4 0.2 - 1.2 mg/dL    eGFR Non African Amer 28 (L) >60 mL/min/1.73    Globulin 4.2 gm/dL    A/G Ratio 0.8 g/dL    BUN/Creatinine Ratio 14.4 7.0 - 25.0    Anion Gap 10.9 mmol/L   Protime-INR   Result Value Ref Range    Protime 16.5 (H) 12.1 - 15.0 Seconds    INR 1.32 (H) 0.90 - 1.10   Troponin   Result Value Ref Range    Troponin T <0.010 0.000 - 0.030 ng/mL   Urinalysis With Microscopic If Indicated (No Culture) - Urine, Catheter   Result Value Ref Range    Color, UA Yellow Yellow, Straw    Appearance, UA Clear Clear    pH, UA 5.5 4.5 - 8.0    Specific Gravity, UA 1.015 1.003 - 1.030    Glucose, UA Negative Negative     Ketones, UA Negative Negative, 80 mg/dL (3+), >=160 mg/dL (4+)    Bilirubin, UA Negative Negative    Blood, UA Negative Negative    Protein, UA Negative Negative    Leuk Esterase, UA Negative Negative    Nitrite, UA Negative Negative    Urobilinogen, UA 0.2 E.U./dL 0.2 - 1.0 E.U./dL   CBC Auto Differential   Result Value Ref Range    WBC 6.48 4.80 - 10.80 10*3/mm3    RBC 2.12 (L) 4.20 - 5.40 10*6/mm3    Hemoglobin 6.9 (C) 12.0 - 16.0 g/dL    Hematocrit 21.5 (C) 37.0 - 47.0 %    .4 (H) 81.0 - 99.0 fL    MCH 32.5 (H) 27.0 - 31.0 pg    MCHC 32.1 31.0 - 37.0 g/dL    RDW 18.8 (H) 11.5 - 14.5 %    RDW-SD 66.7 (H) 37.0 - 54.0 fl    MPV 11.3 (H) 7.4 - 10.4 fL    Platelets 492 140 - 500 10*3/mm3    Neutrophil % 54.3 45.0 - 70.0 %    Lymphocyte % 32.7 20.0 - 45.0 %    Monocyte % 4.2 3.0 - 8.0 %    Eosinophil % 5.7 (H) 0.0 - 4.0 %    Basophil % 1.4 0.0 - 2.0 %    Immature Grans % 1.7 (H) 0.0 - 0.5 %    Neutrophils, Absolute 3.52 1.50 - 8.30 10*3/mm3    Lymphocytes, Absolute 2.12 0.60 - 4.80 10*3/mm3    Monocytes, Absolute 0.27 0.00 - 1.00 10*3/mm3    Eosinophils, Absolute 0.37 (H) 0.10 - 0.30 10*3/mm3    Basophils, Absolute 0.09 0.00 - 0.20 10*3/mm3    Immature Grans, Absolute 0.11 (H) 0.00 - 0.03 10*3/mm3    nRBC 0.0 0.0 - 0.0 /100 WBC   POC Glucose Once   Result Value Ref Range    Glucose 320 (A) 70 - 130 mg/dL   POC Glucose Once   Result Value Ref Range    Glucose 320 (H) 70 - 130 mg/dL   Type & Screen   Result Value Ref Range    ABO Type B     RH type Positive     Antibody Screen Negative     T&S Expiration Date 11/22/2018 11:59:59 PM    Prepare RBC, 1 Units   Result Value Ref Range    Product Code [id number]     Unit Number [id number]     UNIT  ABO B     UNIT  RH POS     Dispense Status IS     Blood Type BPOS     Blood Expiration Date [id number]     Blood Type Barcode 7300      CONSULT  Time 1830  Discussed case with Dr Murguia  Reviewed history, exam, results and treatments.  Discussed concerns and plan of  care. Dr Murguia accepts pt to be admitted to [facility]. Pt and family agree                MDM  Number of Diagnoses or Management Options  Blood loss anemia: new and requires workup  Current use of long term anticoagulation: new and requires workup     Amount and/or Complexity of Data Reviewed  Clinical lab tests: reviewed and ordered  Tests in the medicine section of CPT®: reviewed and ordered  Decide to obtain previous medical records or to obtain history from someone other than the patient: yes  Obtain history from someone other than the patient: yes  Review and summarize past medical records: yes  Discuss the patient with other providers: yes    Risk of Complications, Morbidity, and/or Mortality  Presenting problems: high  Diagnostic procedures: high  Management options: high    Patient Progress  Patient progress: improved        Final diagnoses:   Blood loss anemia   Current use of long term anticoagulation     Dictated utilizing Dragon dictation           Idalmis Quarles, PA-C  11/19/18 2040

## 2018-11-20 ENCOUNTER — READMISSION MANAGEMENT (OUTPATIENT)
Dept: CALL CENTER | Facility: HOSPITAL | Age: 78
End: 2018-11-20

## 2018-11-20 LAB
ABO + RH BLD: NORMAL
ANION GAP SERPL CALCULATED.3IONS-SCNC: 11.7 MMOL/L
BASOPHILS # BLD AUTO: 0.08 10*3/MM3 (ref 0–0.2)
BASOPHILS NFR BLD AUTO: 1.3 % (ref 0–1.5)
BH BB BLOOD EXPIRATION DATE: NORMAL
BH BB BLOOD TYPE BARCODE: 7300
BH BB DISPENSE STATUS: NORMAL
BH BB PRODUCT CODE: NORMAL
BH BB UNIT NUMBER: NORMAL
BUN BLD-MCNC: 21 MG/DL (ref 8–23)
BUN/CREAT SERPL: 12.1 (ref 7–25)
CALCIUM SPEC-SCNC: 8.8 MG/DL (ref 8.6–10.5)
CHLORIDE SERPL-SCNC: 104 MMOL/L (ref 98–107)
CO2 SERPL-SCNC: 28.3 MMOL/L (ref 22–29)
CREAT BLD-MCNC: 1.74 MG/DL (ref 0.57–1)
DEPRECATED RDW RBC AUTO: 63.2 FL (ref 37–54)
EOSINOPHIL # BLD AUTO: 0.38 10*3/MM3 (ref 0–0.7)
EOSINOPHIL NFR BLD AUTO: 6.3 % (ref 0.3–6.2)
ERYTHROCYTE [DISTWIDTH] IN BLOOD BY AUTOMATED COUNT: 18.3 % (ref 11.7–13)
FOLATE SERPL-MCNC: >20 NG/ML (ref 4.78–24.2)
GFR SERPL CREATININE-BSD FRML MDRD: 28 ML/MIN/1.73
GLUCOSE BLD-MCNC: 144 MG/DL (ref 65–99)
GLUCOSE BLDC GLUCOMTR-MCNC: 100 MG/DL (ref 70–130)
GLUCOSE BLDC GLUCOMTR-MCNC: 142 MG/DL (ref 70–130)
GLUCOSE BLDC GLUCOMTR-MCNC: 178 MG/DL (ref 70–130)
HBA1C MFR BLD: 7.43 % (ref 4.8–5.6)
HCT VFR BLD AUTO: 23.6 % (ref 35.6–45.5)
HCT VFR BLD AUTO: 24.5 % (ref 35.6–45.5)
HGB BLD-MCNC: 7.6 G/DL (ref 11.9–15.5)
HGB BLD-MCNC: 7.9 G/DL (ref 11.9–15.5)
IMM GRANULOCYTES # BLD: 0.01 10*3/MM3 (ref 0–0.03)
IMM GRANULOCYTES NFR BLD: 0.2 % (ref 0–0.5)
IRON 24H UR-MRATE: 178 MCG/DL (ref 37–145)
IRON SATN MFR SERPL: 78 % (ref 20–50)
LYMPHOCYTES # BLD AUTO: 2.54 10*3/MM3 (ref 0.9–4.8)
LYMPHOCYTES NFR BLD AUTO: 42.2 % (ref 19.6–45.3)
MCH RBC QN AUTO: 31.7 PG (ref 26.9–32)
MCHC RBC AUTO-ENTMCNC: 32.2 G/DL (ref 32.4–36.3)
MCV RBC AUTO: 98.4 FL (ref 80.5–98.2)
MONOCYTES # BLD AUTO: 0.22 10*3/MM3 (ref 0.2–1.2)
MONOCYTES NFR BLD AUTO: 3.7 % (ref 5–12)
NEUTROPHILS # BLD AUTO: 2.8 10*3/MM3 (ref 1.9–8.1)
NEUTROPHILS NFR BLD AUTO: 46.5 % (ref 42.7–76)
NRBC BLD MANUAL-RTO: 0 /100 WBC (ref 0–0)
PLATELET # BLD AUTO: 411 10*3/MM3 (ref 140–500)
PMV BLD AUTO: 10.8 FL (ref 6–12)
POTASSIUM BLD-SCNC: 4.5 MMOL/L (ref 3.5–5.2)
RBC # BLD AUTO: 2.49 10*6/MM3 (ref 3.9–5.2)
RETICS/RBC NFR AUTO: 1.86 % (ref 0.5–1.5)
SODIUM BLD-SCNC: 144 MMOL/L (ref 136–145)
TIBC SERPL-MCNC: 228 MCG/DL
TRANSFERRIN SERPL-MCNC: 153 MG/DL (ref 200–360)
UNIT  ABO: NORMAL
UNIT  RH: NORMAL
VIT B12 BLD-MCNC: 397 PG/ML (ref 211–946)
WBC NRBC COR # BLD: 6.02 10*3/MM3 (ref 4.5–10.7)

## 2018-11-20 PROCEDURE — 25010000002 ONDANSETRON PER 1 MG: Performed by: NURSE PRACTITIONER

## 2018-11-20 PROCEDURE — 85018 HEMOGLOBIN: CPT | Performed by: NURSE PRACTITIONER

## 2018-11-20 PROCEDURE — 80048 BASIC METABOLIC PNL TOTAL CA: CPT | Performed by: NURSE PRACTITIONER

## 2018-11-20 PROCEDURE — 99221 1ST HOSP IP/OBS SF/LOW 40: CPT | Performed by: INTERNAL MEDICINE

## 2018-11-20 PROCEDURE — 82962 GLUCOSE BLOOD TEST: CPT

## 2018-11-20 PROCEDURE — 96374 THER/PROPH/DIAG INJ IV PUSH: CPT

## 2018-11-20 PROCEDURE — 63710000001 INSULIN LISPRO (HUMAN) PER 5 UNITS: Performed by: NURSE PRACTITIONER

## 2018-11-20 PROCEDURE — 96361 HYDRATE IV INFUSION ADD-ON: CPT

## 2018-11-20 PROCEDURE — G0378 HOSPITAL OBSERVATION PER HR: HCPCS

## 2018-11-20 PROCEDURE — 83036 HEMOGLOBIN GLYCOSYLATED A1C: CPT | Performed by: NURSE PRACTITIONER

## 2018-11-20 PROCEDURE — 85014 HEMATOCRIT: CPT | Performed by: NURSE PRACTITIONER

## 2018-11-20 PROCEDURE — 85045 AUTOMATED RETICULOCYTE COUNT: CPT | Performed by: INTERNAL MEDICINE

## 2018-11-20 RX ORDER — GABAPENTIN 400 MG/1
400 CAPSULE ORAL
COMMUNITY
End: 2018-11-26 | Stop reason: SDUPTHER

## 2018-11-20 RX ORDER — NICOTINE POLACRILEX 4 MG
15 LOZENGE BUCCAL
Status: DISCONTINUED | OUTPATIENT
Start: 2018-11-20 | End: 2018-11-25 | Stop reason: HOSPADM

## 2018-11-20 RX ORDER — DEXTROSE MONOHYDRATE 25 G/50ML
25 INJECTION, SOLUTION INTRAVENOUS
Status: DISCONTINUED | OUTPATIENT
Start: 2018-11-20 | End: 2018-11-25 | Stop reason: HOSPADM

## 2018-11-20 RX ORDER — POTASSIUM CHLORIDE 750 MG/1
10 CAPSULE, EXTENDED RELEASE ORAL EVERY MORNING
COMMUNITY
End: 2019-04-04 | Stop reason: SDUPTHER

## 2018-11-20 RX ORDER — ATORVASTATIN CALCIUM 10 MG/1
10 TABLET, FILM COATED ORAL NIGHTLY
COMMUNITY
End: 2018-11-30 | Stop reason: SDUPTHER

## 2018-11-20 RX ORDER — CYCLOBENZAPRINE HCL 10 MG
5 TABLET ORAL 2 TIMES DAILY PRN
Status: DISCONTINUED | OUTPATIENT
Start: 2018-11-20 | End: 2018-11-25 | Stop reason: HOSPADM

## 2018-11-20 RX ADMIN — SODIUM CHLORIDE 100 ML/HR: 9 INJECTION, SOLUTION INTRAVENOUS at 10:45

## 2018-11-20 RX ADMIN — SODIUM CHLORIDE, PRESERVATIVE FREE 3 ML: 5 INJECTION INTRAVENOUS at 20:43

## 2018-11-20 RX ADMIN — SODIUM CHLORIDE, PRESERVATIVE FREE 3 ML: 5 INJECTION INTRAVENOUS at 08:38

## 2018-11-20 RX ADMIN — SODIUM CHLORIDE 100 ML/HR: 9 INJECTION, SOLUTION INTRAVENOUS at 00:23

## 2018-11-20 RX ADMIN — INSULIN LISPRO 2 UNITS: 100 INJECTION, SOLUTION INTRAVENOUS; SUBCUTANEOUS at 20:42

## 2018-11-20 RX ADMIN — SODIUM CHLORIDE, PRESERVATIVE FREE 3 ML: 5 INJECTION INTRAVENOUS at 00:36

## 2018-11-20 RX ADMIN — CYCLOBENZAPRINE 5 MG: 10 TABLET, FILM COATED ORAL at 20:42

## 2018-11-20 RX ADMIN — ONDANSETRON 4 MG: 2 INJECTION INTRAMUSCULAR; INTRAVENOUS at 00:26

## 2018-11-20 NOTE — PROGRESS NOTES
Clinical Pharmacy Services: Medication History    Joya Singh is a 78 y.o. female presenting to [facility] for GL BLEED    She  has a past medical history of Allergic rhinitis, Anxiety, Arthritis, Bell's palsy, CKD (chronic kidney disease) stage 3, GFR 30-59 ml/min (CMS/[facility]), Depression, Diabetes mellitus (CMS/[facility]), Diabetic gastroparesis (CMS/[facility]) (2/19/2016), GERD (gastroesophageal reflux disease), GI bleed, H/O blood clots, History of transfusion, Hyperlipidemia, Hypertension, Hypothyroidism, Normal coronary arteries, AARON (obstructive sleep apnea), PONV (postoperative nausea and vomiting), Skin cancer, Stroke (CMS/[facility]), and TIA (transient ischemic attack).    Allergies as of 11/19/2018 - Reviewed 11/19/2018   Allergen Reaction Noted   • Lisinopril Cough 11/14/2017   • Morphine and related  12/02/2015   • Baclofen Anxiety 08/04/2017   • Codeine Itching and Rash 11/19/2012   • Morphine Rash 11/19/2012   • Penicillins Rash 11/19/2012       Medication information was obtained from: [facility] pharmacy   Pharmacy and Phone Number: [facility] acute hospital [address] [facility] [phone] [facility] 689-5...    Prior to Admission Medications       Prescriptions Last Dose Informant Patient Reported? Taking?    ACCU-CHEK FASTCLIX LANCETS [facility]  Pharmacy No No    TEST 3-4 TIMES DAILY AS DIRECTED    ACCU-CHEK SMARTVIEW test strip  Pharmacy No No    TEST blood sugar three times daily OR AS DIRECTED    acetaminophen (TYLENOL) 325 MG tablet  Pharmacy No Yes    Take 2 tablets by mouth Every 6 (Six) Hours As Needed for Mild Pain . OTC product    Alcohol Swabs (B-D SINGLE USE SWABS REGULAR) pads  Pharmacy Yes No    atorvastatin (LIPITOR) 10 MG tablet 11/18/2018 Pharmacy Yes Yes    Take 10 mg by mouth Every Night.    Blood Glucose Monitoring Suppl (ACCU-CHEK KENNETH SMARTVIEW) w/Device kit  Pharmacy No No    TEST blood sugar three times daily or as directed    DULoxetine (CYMBALTA) 30 MG capsule  "11/19/2018 Pharmacy Yes Yes    Take 30 mg by mouth 2 (Two) Times a Day.    ELIQUIS 2.5 MG tablet tablet 11/19/2018 Pharmacy No Yes    TAKE ONE (1) TABLET ORALLY (BY MOUTH) TWICE DAILY    furosemide (LASIX) 20 MG tablet 11/19/2018 Pharmacy No Yes    Take 1 tablet by mouth Daily.    gabapentin (NEURONTIN) 400 MG capsule 11/19/2018 Pharmacy Yes Yes    Take 400 mg by mouth 3 (Three) Times a Day. 400 mg AM, 400 mg NOON, 800 mg QHS    gabapentin (NEURONTIN) 400 MG capsule 11/18/2018 Pharmacy Yes No    Take 400 mg by mouth every night at bedtime. 400 mg AM, 400 mg NOON, 800 mg QHS    insulin aspart (novoLOG FLEXPEN) 100 UNIT/ML solution pen-injector sc pen 11/19/2018 Pharmacy Yes Yes    Inject 30 Units under the skin into the appropriate area as directed Every Morning Before Breakfast. 30 units with breakfast 35 units with lunch 35 units with dinner    insulin aspart (novoLOG) 100 UNIT/ML injection 11/19/2018 Pharmacy Yes Yes    Inject 35 Units under the skin into the appropriate area as directed Daily Before Lunch. 30 units with breakfast 35 units with lunch 35 units with dinner    insulin aspart (novoLOG) 100 UNIT/ML injection 11/19/2018 Pharmacy Yes Yes    Inject 35 Units under the skin into the appropriate area as directed Daily Before Supper. 30 units with breakfast 35 units with lunch 35 units with dinner    Insulin Degludec (TRESIBA FLEXTOUCH) 200 UNIT/ML solution pen-injector 11/18/2018  Yes Yes    Inject 50 Units under the skin into the appropriate area as directed every night at bedtime.    levothyroxine (SYNTHROID) 88 MCG tablet 11/19/2018 Pharmacy No Yes    Take 1 tablet by mouth Daily.    midodrine (PROAMATINE) 2.5 MG tablet 11/19/2018 Pharmacy No Yes    Take 1 tablet by mouth 2 (Two) Times a Day.    Patient taking differently:  Take 2.5 mg by mouth 2 (Two) Times a Day. Takes QAM and NOON    Needle, Disp, (BD DISP NEEDLES) 30G X 1/2\" [facility]  Pharmacy No No    To be used 3 times daily with Novolog Flexpen.    O2 " (OXYGEN)  Pharmacy Yes Yes    Inhale 2 L/min Every Night. Uses 2L  overnight only    omeprazole (priLOSEC) 40 MG capsule 11/19/2018 Pharmacy No Yes    Take one (1) capsule orally (by mouth) once daily    potassium chloride (MICRO-K) 10 MEQ CR capsule 11/19/2018 Pharmacy Yes Yes    Take 10 mEq by mouth Every Morning.    promethazine (PHENERGAN) 12.5 MG tablet Unknown Pharmacy No No    1/2 to 1 tablet every 6 hours as needed for nausea           Medication notes: patient has medications packaged into planners at [facility]     This medication list is complete to the best of my knowledge as of 11/20/2018    Please call if questions.    Angely Dee, PharmD, BCPS  11/20/2018 12:08 PM

## 2018-11-20 NOTE — CONSULTS
Patient Care Team:  Chari Mercado MD as PCP - General  Chari Mercado MD as PCP - Family Medicine  Christos Rob MD as Surgeon (General Surgery)  German Wylie MD as Surgeon (Orthopedic Surgery)    CHIEF COMPLAINT: Anemia    HISTORY OF PRESENT ILLNESS:    79 yo WF recently in [place] for possible melena but her endoscopies were unremarkable. Her post D/C HGB a week after was only sl down but then 2 weeks after that was down 2 units. No visible blood. And no bruising.   Has chronic Kidney disease and her Retic is inappropriately normal at 1.8%    As per Dr Murguia..Ms. Singh is a 78 y.o. female with a history of CKD3, DM2, GERD, GI bleed, HLD, HTN, Hypothyroidism, AARON, CVA that presents to [facility] complaining of dizziness/lightheadedness and low hemoglobin. Patient was hospitalized about 3 weeks ago at [facility] for GI bleed though no source of bleeding was identified at that time (EGD and colonoscopy unremarkable) and apparently her use of eliquis wasn't addressed either. She states that she was given 5 units PRBC on last admission and since discharge, has become progressively more fatigued, weak, dizzy/lightheaded, short of breath. These symptoms have been ongoing since that time but reportedly worse in the past 3 days. She was seen at PCP today and POC hemoglobin resulted at 5.5 in office and she was sent to ED. Upon arrival to [facility], her hemoglobin was actually 6.5 and she was given 1 unit PRBC prior to transfer from [facility]. She does report orange/red tinged stool over the past 3-4 days and an intermittent aching pain to her right abdomen that she's experienced in that time. She reports that she has been previously diagnosed with anemia, but can not elaborate on details of her workup. She denies fever, chills, chest pain, nausea, vomiting, change in appetite, changes in bladder habits, edema.      .      Past Medical History:   Diagnosis Date   •  Allergic rhinitis    • Anxiety    • Arthritis    • Bell's palsy    • CKD (chronic kidney disease) stage 3, GFR 30-59 ml/min (CMS/[facility])    • Depression    • Diabetes mellitus (CMS/[facility])     LAST A1C 6   • Diabetic gastroparesis (CMS/[facility]) 2/19/2016   • GERD (gastroesophageal reflux disease)    • GI bleed    • H/O blood clots     LEFT LEG 7 OR 8 YEARS AGO   • History of transfusion    • Hyperlipidemia    • Hypertension    • Hypothyroidism    • Normal coronary arteries     by cath 2013   • AARON (obstructive sleep apnea)     DOESNT WEAR REGULARLY   • PONV (postoperative nausea and vomiting)    • Skin cancer    • Stroke (CMS/[facility])    • TIA (transient ischemic attack)     LAST TIA JULY 2017     Past Surgical History:   Procedure Laterality Date   • BACK SURGERY      HARDWARE   • CHOLECYSTECTOMY      OPEN   • COLONOSCOPY  2011    due for repeat in 2021   • HYSTERECTOMY      PARTIAL    • NECK SURGERY     • SPINE SURGERY     • UPPER GASTROINTESTINAL ENDOSCOPY  2014    gastritis.  done by dr. hastings     Family History   Problem Relation Age of Onset   • Lupus Mother    • Heart failure Mother 59   • Heart disease Other    • Hypertension Other    • Heart attack Father      Social History     Tobacco Use   • Smoking status: Never Smoker   • Smokeless tobacco: Never Used   • Tobacco comment: CAFFEINE USE: NONE   Substance Use Topics   • Alcohol use: No   • Drug use: No     Medications Prior to Admission   Medication Sig Dispense Refill Last Dose   • acetaminophen (TYLENOL) 325 MG tablet Take 2 tablets by mouth Every 6 (Six) Hours As Needed for Mild Pain . OTC product 40 tablet 0 11/18/2018 at Unknown time   • atorvastatin (LIPITOR) 10 MG tablet Take 10 mg by mouth Every Night.   11/18/2018 at 2100   • DULoxetine (CYMBALTA) 30 MG capsule Take 30 mg by mouth 2 (Two) Times a Day.   11/19/2018 at Unknown time   • ELIQUIS 2.5 MG tablet tablet TAKE ONE (1) TABLET ORALLY (BY MOUTH) TWICE DAILY 60 tablet 1 11/19/2018 at Unknown time   •  furosemide (LASIX) 20 MG tablet Take 1 tablet by mouth Daily.   11/19/2018 at Unknown time   • gabapentin (NEURONTIN) 400 MG capsule Take 400 mg by mouth 3 (Three) Times a Day. 400 mg AM, 400 mg NOON, 800 mg QHS   11/19/2018 at Unknown time   • insulin aspart (novoLOG FLEXPEN) 100 UNIT/ML solution pen-injector sc pen Inject 30 Units under the skin into the appropriate area as directed Every Morning Before Breakfast. 30 units with breakfast 35 units with lunch 35 units with dinner   11/19/2018 at Unknown time   • insulin aspart (novoLOG) 100 UNIT/ML injection Inject 35 Units under the skin into the appropriate area as directed Daily Before Lunch. 30 units with breakfast 35 units with lunch 35 units with dinner   11/19/2018 at Unknown time   • insulin aspart (novoLOG) 100 UNIT/ML injection Inject 35 Units under the skin into the appropriate area as directed Daily Before Supper. 30 units with breakfast 35 units with lunch 35 units with dinner   11/19/2018 at Unknown time   • Insulin Degludec (TRESIBA FLEXTOUCH) 200 UNIT/ML solution pen-injector Inject 50 Units under the skin into the appropriate area as directed every night at bedtime.   11/18/2018   • levothyroxine (SYNTHROID) 88 MCG tablet Take 1 tablet by mouth Daily. 30 tablet 3 11/19/2018 at Unknown time   • midodrine (PROAMATINE) 2.5 MG tablet Take 1 tablet by mouth 2 (Two) Times a Day. (Patient taking differently: Take 2.5 mg by mouth 2 (Two) Times a Day. Takes QAM and NOON) 90 tablet 1 11/19/2018 at Unknown time   • O2 (OXYGEN) Inhale 2 L/min Every Night. Uses 2L  overnight only   Taking   • omeprazole (priLOSEC) 40 MG capsule Take one (1) capsule orally (by mouth) once daily 90 capsule 1 11/19/2018 at Unknown time   • potassium chloride (MICRO-K) 10 MEQ CR capsule Take 10 mEq by mouth Every Morning.   11/19/2018 at Unknown time   • ACCU-CHEK FASTCLIX LANCETS misc TEST 3-4 TIMES DAILY AS DIRECTED 400 each 3 Taking   • ACCU-CHEK SMARTVIEW test strip TEST blood  "sugar three times daily OR AS DIRECTED 300 each 3 Taking   • Alcohol Swabs (B-D SINGLE USE SWABS REGULAR) pads    Taking   • Blood Glucose Monitoring Suppl (ACCU-CHEK KENNETH SMARTVIEW) w/Device kit TEST blood sugar three times daily or as directed 1 kit 0 Taking   • gabapentin (NEURONTIN) 400 MG capsule Take 400 mg by mouth every night at bedtime. 400 mg AM, 400 mg NOON, 800 mg QHS   11/18/2018   • Needle, Disp, (BD DISP NEEDLES) 30G X 1/2\" misc To be used 3 times daily with Novolog Flexpen. 100 each 5 Taking   • promethazine (PHENERGAN) 12.5 MG tablet 1/2 to 1 tablet every 6 hours as needed for nausea 20 tablet 0 Unknown at Unknown time     Allergies:  Lisinopril; Baclofen; Codeine; Morphine; and Penicillins    REVIEW OF SYSTEMS:  Please see the above history of present illness for pertinent positives and negatives.  The remainder of the patient's systems have been reviewed and are negative.     Vital Signs  Temp:  [97.6 °F (36.4 °C)-98.7 °F (37.1 °C)] 97.9 °F (36.6 °C)  Heart Rate:  [74-91] 74  Resp:  [14-18] 16  BP: ()/(45-85) 131/58    Flowsheet Rows      First Filed Value   Admission Height  157.5 cm (62.01\") Documented at 11/19/2018 2238   Admission Weight  92 kg (202 lb 14.4 oz) Documented at 11/19/2018 2238           Physical Exam:  Physical Exam   Constitutional: Patient appears well-developed and well-nourished and in no acute distress   HEENT:   Head: Normocephalic and atraumatic.   Eyes:  Pupils are equal, round, and reactive to light. EOM are intact. Sclera are anicteric and non-injected.  Mouth and Throat: Patient has moist mucous membranes. Oropharynx is clear of any erythema or exudate.     Neck: Neck supple. No JVD present. No thyromegaly present. No lymphadenopathy present.  Cardiovascular: Regular rate, regular rhythm, S1 normal and S2 normal.  Exam reveals no gallop and no friction rub.  No murmur heard.  Pulmonary/Chest: Lungs are clear to auscultation bilaterally. No respiratory distress. " No wheezes. No rhonchi. No rales.   Abdominal: Soft. Bowel sounds are normal. No distension and no mass. There is no hepatosplenomegaly. There is mild diffuse tenderness.   Musculoskeletal: Normal Muscle tone  Extremities: No edema. Pulses are palpable in all 4 extremities.  Neurological: Patient is alert and oriented to person, place, and time. Cranial nerves II-XII are grossly intact with no focal deficits.  Skin: Skin is warm. No rash noted. Nails show no clubbing.  No cyanosis or erythema.    Debilities/Disabilities Identified: None  Emotional Behavior: Appropriate     Results Review:    I reviewed the patient's new clinical results.  Lab Results (most recent)     Procedure Component Value Units Date/Time    Reticulocytes [[id number]]  (Abnormal) Collected:  11/20/18 0339    Specimen:  Blood Updated:  11/20/18 1233     Reticulocyte % 1.86 %     POC Glucose Once [[id number]]  (Abnormal) Collected:  11/20/18 0609    Specimen:  Blood Updated:  11/20/18 0617     Glucose 142 mg/dL     Basic Metabolic Panel [[id number]]  (Abnormal) Collected:  11/20/18 0339    Specimen:  Blood Updated:  11/20/18 0506     Glucose 144 mg/dL      BUN 21 mg/dL      Creatinine 1.74 mg/dL      Sodium 144 mmol/L      Potassium 4.5 mmol/L      Chloride 104 mmol/L      CO2 28.3 mmol/L      Calcium 8.8 mg/dL      eGFR Non African Amer 28 mL/min/1.73      BUN/Creatinine Ratio 12.1     Anion Gap 11.7 mmol/L     Narrative:       The MDRD GFR formula is only valid for adults with stable renal function between ages 18 and 70.    Hemoglobin A1c [[id number]]  (Abnormal) Collected:  11/20/18 0339    Specimen:  Blood Updated:  11/20/18 0454     Hemoglobin A1C 7.43 %     Narrative:       Hemoglobin A1C Ranges:    Increased Risk for Diabetes  5.7% to 6.4%  Diabetes                     >= 6.5%  Diabetic Goal                < 7.0%    Hemoglobin & Hematocrit, Blood [[id number]]  (Abnormal) Collected:  11/20/18 0339    Specimen:  Blood Updated:  11/20/18 0449      Hemoglobin 7.6 g/dL      Hematocrit 23.6 %     Vitamin B12 [[id number]]  (Normal) Collected:  11/19/18 2358    Specimen:  Blood Updated:  11/20/18 0054     Vitamin B-12 397 pg/mL     Folate [[id number]]  (Normal) Collected:  11/19/18 2358    Specimen:  Blood Updated:  11/20/18 0054     Folate >20.00 ng/mL     Iron Profile [[id number]]  (Abnormal) Collected:  11/19/18 2358    Specimen:  Blood Updated:  11/20/18 0040     Iron 178 mcg/dL      Iron Saturation 78 %      Transferrin 153 mg/dL      TIBC 228 mcg/dL     CBC & Differential [[id number]] Collected:  11/19/18 2358    Specimen:  Blood Updated:  11/20/18 0020    Narrative:       The following orders were created for panel order CBC & Differential.  Procedure                               Abnormality         Status                     ---------                               -----------         ------                     CBC Auto Differential[[id number]]        Abnormal            Final result                 Please view results for these tests on the individual orders.    CBC Auto Differential [[id number]]  (Abnormal) Collected:  11/19/18 2358    Specimen:  Blood Updated:  11/20/18 0020     WBC 6.02 10*3/mm3      RBC 2.49 10*6/mm3      Hemoglobin 7.9 g/dL      Hematocrit 24.5 %      MCV 98.4 fL      MCH 31.7 pg      MCHC 32.2 g/dL      RDW 18.3 %      RDW-SD 63.2 fl      MPV 10.8 fL      Platelets 411 10*3/mm3      Neutrophil % 46.5 %      Lymphocyte % 42.2 %      Monocyte % 3.7 %      Eosinophil % 6.3 %      Basophil % 1.3 %      Immature Grans % 0.2 %      Neutrophils, Absolute 2.80 10*3/mm3      Lymphocytes, Absolute 2.54 10*3/mm3      Monocytes, Absolute 0.22 10*3/mm3      Eosinophils, Absolute 0.38 10*3/mm3      Basophils, Absolute 0.08 10*3/mm3      Immature Grans, Absolute 0.01 10*3/mm3      nRBC 0.0 /100 WBC           Imaging Results (most recent)     None        reviewed    ECG/EMG Results (most recent)     None        reviewed    Assessment/Plan     Anemia  CKD  IV  DM    Feel her Anemia is probably renal in origen and she has Seen Daily in the Past so will call them for an opinion on Epogen.       I discussed the patients findings and my recommendations with patient.     Emmanuel Rogers MD  11/20/18  1:45 PM    Time: 10 min prior to procedure.

## 2018-11-20 NOTE — OUTREACH NOTE
Medical Week 3 Survey      Responses   Facility patient discharged from?  [place]   Does the patient have one of the following disease processes/diagnoses(primary or secondary)?  Other   Week 3 attempt successful?  No   Revoke  Readmitted          Madison Cardenas RN

## 2018-11-20 NOTE — ED NOTES
[facility], answering service said that Dr. Murguia would be calling back shortly      Anisha Carter CNA  11/19/18 2003

## 2018-11-20 NOTE — PROGRESS NOTES
"    DAILY PROGRESS NOTE  [facility]    Patient Identification:  Name: Joya Singh  Age: 78 y.o.  Sex: female  :  1940  MRN: [id number]         Primary Care Physician: Chari Mercado MD    Subjective:  Interval History: no new c/o - some abd discomfort but no pain - denies n/v/cp/sob     Objective:    Scheduled Meds:  insulin lispro 0-7 Units Subcutaneous 4x Daily With Meals & Nightly   sodium chloride 3 mL Intravenous Q12H     Continuous Infusions:  sodium chloride 100 mL/hr Last Rate: 100 mL/hr (18 1045)       Vital signs in last 24 hours:  Temp:  [97.6 °F (36.4 °C)-98.7 °F (37.1 °C)] 97.9 °F (36.6 °C)  Heart Rate:  [74-91] 74  Resp:  [14-18] 16  BP: ()/(45-85) 131/58    Intake/Output:    Intake/Output Summary (Last 24 hours) at 2018 1427  Last data filed at 2018 0246  Gross per 24 hour   Intake --   Output 350 ml   Net -350 ml       Exam:  /58 (BP Location: Right arm, Patient Position: Lying)   Pulse 74   Temp 97.9 °F (36.6 °C) (Oral)   Resp 16   Ht 157.5 cm (62.01\")   Wt 92 kg (202 lb 14.4 oz)   SpO2 96%   BMI 37.10 kg/m²     General Appearance:    Alert, cooperative, no distress, AAOx3                        Throat:   Lips, tongue, gums normal; oral mucosa pink and moist                           Neck:   Supple, symmetrical, trachea midline, no JVD                         Lungs:    Clear to auscultation bilaterally, respirations unlabored                          Heart:    Regular rate and rhythm, S1 and S2 normal                       Abdomen:     Soft, mild lower ttp w/o r/g, bowel sounds active                 Extremities:   Extremities normal, atraumatic, no cyanosis or edema                            Data Review:  Labs in chart were reviewed.    Assessment:  Active Hospital Problems    Diagnosis Date Noted   • **Acute blood loss anemia [D62] 2018   • 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) [E11.22, N18.3] 2017 "   • Chronic anemia [D64.9] 02/02/2016   • Hyperlipidemia [E78.5] 02/02/2016   • Hypertension [I10] 02/02/2016   • Obstructive sleep apnea syndrome [G47.33] 02/02/2016   • Chronic back pain [M54.9, G89.29] 02/02/2016   • Type 2 diabetes mellitus with neurologic complication (CMS/HCC) [E11.49] 02/02/2016      Resolved Hospital Problems   No resolved problems to display.       Plan:  ABLA - GI consult pending though patient states was seen by GI earlier - call placed to Dr Rogers to go over plan as patient claims no plans for further testing or intervention and diet was advanced    -s/p 1upRBCs   -off Eliquis - due to phx DVT/CVA but denies AFib - this is probable etiology for chronic GIB - will need to consider permanent DC and will consider Heme consult pending GI's input    -SLIVF   -repeating anemia labs as inaccurate given multiple transfusions - add reticulated Hgb     CKD3 - stable     DM2 - A1c 7.43    HTN/HLD     Tigre Hoffman MD  11/20/2018  2:27 PM

## 2018-11-20 NOTE — PLAN OF CARE
Problem: Patient Care Overview  Goal: Plan of Care Review  Outcome: Ongoing (interventions implemented as appropriate)   11/20/18 1527   OTHER   Outcome Summary VSS. Patient continues to c/o fatigue and occasional. GI and Nephrology consulted today. No c/o pain this shift. Will continue to monitor.     Goal: Individualization and Mutuality  Outcome: Ongoing (interventions implemented as appropriate)    Goal: Interprofessional Rounds/Family Conf  Outcome: Ongoing (interventions implemented as appropriate)      Problem: Fall Risk (Adult)  Goal: Identify Related Risk Factors and Signs and Symptoms  Outcome: Ongoing (interventions implemented as appropriate)    Goal: Absence of Fall  Outcome: Ongoing (interventions implemented as appropriate)      Problem: Nausea/Vomiting (Adult)  Goal: Identify Related Risk Factors and Signs and Symptoms  Outcome: Ongoing (interventions implemented as appropriate)    Goal: Symptom Relief  Outcome: Ongoing (interventions implemented as appropriate)    Goal: Adequate Hydration  Outcome: Ongoing (interventions implemented as appropriate)      Problem: Anemia (Adult)  Goal: Identify Related Risk Factors and Signs and Symptoms  Outcome: Ongoing (interventions implemented as appropriate)    Goal: Symptom Improvement  Outcome: Ongoing (interventions implemented as appropriate)

## 2018-11-20 NOTE — H&P
Name: Joya Singh ADMIT: 2018   : 1940  PCP: Chari Mercado MD    MRN: [id number] LOS: 0 days   AGE/SEX: 78 y.o. female  ROOM: [facility] ([place])     Chief complaint: dizziness, lightheadedness, shortness of breath, low hgb    Subjective     Ms. Singh is a 78 y.o. female with a history of CKD3, DM2, GERD, GI bleed, HLD, HTN, Hypothyroidism, AARON, CVA that presents to [facility] complaining of dizziness/lightheadedness and low hemoglobin. Patient was hospitalized about 3 weeks ago at [place] for GI bleed though no source of bleeding was identified at that time (EGD and colonoscopy unremarkable) and apparently her use of eliquis wasn't addressed either. She states that she was given 5 units PRBC on last admission and since discharge, has become progressively more fatigued, weak, dizzy/lightheaded, short of breath. These symptoms have been ongoing since that time but reportedly worse in the past 3 days. She was seen at PCP today and POC hemoglobin resulted at 5.5 in office and she was sent to ED. Upon arrival to [facility], her hemoglobin was actually 6.5 and she was given 1 unit PRBC prior to transfer from [place]. She does report orange/red tinged stool over the past 3-4 days and an intermittent aching pain to her right abdomen that she's experienced in that time. She reports that she has been previously diagnosed with anemia, but can not elaborate on details of her workup. She denies fever, chills, chest pain, nausea, vomiting, change in appetite, changes in bladder habits, edema.    History of Present Illness    Past Medical History:   Diagnosis Date   • Allergic rhinitis    • Anxiety    • Arthritis    • Bell's palsy    • CKD (chronic kidney disease) stage 3, GFR 30-59 ml/min (CMS/[facility])    • Depression    • Diabetes mellitus (CMS/[facility])     LAST A1C 6   • Diabetic gastroparesis (CMS/[facility]) 2016   • GERD (gastroesophageal reflux disease)    • GI bleed    • H/O blood clots      LEFT LEG 7 OR 8 YEARS AGO   • History of transfusion    • Hyperlipidemia    • Hypertension    • Hypothyroidism    • Normal coronary arteries     by cath 2013   • AARON (obstructive sleep apnea)     DOESNT WEAR REGULARLY   • PONV (postoperative nausea and vomiting)    • Skin cancer    • Stroke (CMS/HCC)    • TIA (transient ischemic attack)     LAST TIA JULY 2017     Past Surgical History:   Procedure Laterality Date   • BACK SURGERY      HARDWARE   • CHOLECYSTECTOMY      OPEN   • COLONOSCOPY  2011    due for repeat in 2021   • HYSTERECTOMY      PARTIAL    • NECK SURGERY     • SPINE SURGERY     • UPPER GASTROINTESTINAL ENDOSCOPY  2014    gastritis.  done by dr. hastings     Family History   Problem Relation Age of Onset   • Lupus Mother    • Heart failure Mother 59   • Heart disease Other    • Hypertension Other    • Heart attack Father      Social History     Tobacco Use   • Smoking status: Never Smoker   • Smokeless tobacco: Never Used   • Tobacco comment: CAFFEINE USE: NONE   Substance Use Topics   • Alcohol use: No   • Drug use: No     Medications Prior to Admission   Medication Sig Dispense Refill Last Dose   • ACCU-CHEK FASTCLIX LANCETS misc TEST 3-4 TIMES DAILY AS DIRECTED 400 each 3 Taking   • ACCU-CHEK SMARTVIEW test strip TEST blood sugar three times daily OR AS DIRECTED 300 each 3 Taking   • acetaminophen (TYLENOL) 325 MG tablet Take 2 tablets by mouth Every 6 (Six) Hours As Needed for Mild Pain . OTC product 40 tablet 0 11/18/2018 at Unknown time   • Alcohol Swabs (B-D SINGLE USE SWABS REGULAR) pads    Taking   • atorvastatin (LIPITOR) 10 MG tablet Take one (1) tablet orally (by mouth) once daily 90 tablet 2 Taking   • Blood Glucose Monitoring Suppl (ACCU-CHEK KENNETH SMARTVIEW) w/Device kit TEST blood sugar three times daily or as directed 1 kit 0 Taking   • DULoxetine (CYMBALTA) 30 MG capsule Take 30 mg by mouth Daily.   Taking   • Elastic Bandages & Supports (CARPAL TUNNEL WRIST STABILIZER) misc 1 each Daily.  "1 each 0 Taking   • ELIQUIS 2.5 MG tablet tablet TAKE ONE (1) TABLET ORALLY (BY MOUTH) TWICE DAILY 60 tablet 1 Taking   • furosemide (LASIX) 20 MG tablet Take 1 tablet by mouth Daily.   Taking   • gabapentin (NEURONTIN) 300 MG capsule Take 300 mg by mouth Daily.      • insulin aspart (NOVOLOG FLEXPEN) 100 UNIT/ML solution pen-injector sc pen DO NOT RESUME UNTIL TOLERATING REGULAR DIET AGAIN 15 mL 0 Taking   • Insulin Degludec (TRESIBA FLEXTOUCH) 200 UNIT/ML solution pen-injector Inject 50 Units under the skin into the appropriate area as directed Daily. 3 pen 11 Taking   • lactobacillus acidophilus (RISAQUAD) capsule capsule Take 1 capsule by mouth Daily. 30 capsule 0 Taking   • levothyroxine (SYNTHROID) 88 MCG tablet Take 1 tablet by mouth Daily. 30 tablet 3 Taking   • midodrine (PROAMATINE) 2.5 MG tablet Take 1 tablet by mouth 2 (Two) Times a Day. 90 tablet 1 Taking   • Needle, Disp, (BD DISP NEEDLES) 30G X 1/2\" misc To be used 3 times daily with Novolog Flexpen. 100 each 5 Taking   • nystatin (MYCOSTATIN) 353395 UNIT/GM powder Apply  topically 3 (Three) Times a Day. 56.7 g 1 Taking   • O2 (OXYGEN) Inhale 2 L/min Every Night.   Taking   • omeprazole (priLOSEC) 40 MG capsule Take one (1) capsule orally (by mouth) once daily 90 capsule 1 Taking   • promethazine (PHENERGAN) 12.5 MG tablet 1/2 to 1 tablet every 6 hours as needed for nausea 20 tablet 0 Taking   • ULTICARE MICRO PEN NEEDLES 32G X 4 MM misc    Taking   • ULTICARE MINI PEN NEEDLES 31G X 6 MM misc USE TO INJECT INSULINS 4 TIMES DAILY AS DIRECTED 100 each 10 Taking   • ULTICARE PEN NEEDLES 29G X 12MM misc    Taking     Allergies:    Allergies   Allergen Reactions   • Lisinopril Cough   • Morphine And Related    • Baclofen Anxiety     Panic attack, nightmares   • Codeine Itching and Rash   • Morphine Rash   • Penicillins Rash       Review of Systems   Constitutional: Positive for activity change and fatigue. Negative for chills and fever.   HENT: Negative " for congestion and sore throat.    Eyes: Positive for visual disturbance (blurry vision due to lightheadedness).   Respiratory: Positive for shortness of breath. Negative for cough.    Cardiovascular: Negative.  Negative for chest pain, palpitations and leg swelling.   Gastrointestinal: Positive for abdominal pain and blood in stool (orange/red tinged stools). Negative for constipation, diarrhea, nausea and vomiting.   Endocrine: Negative.    Genitourinary: Negative.  Negative for dysuria, frequency and urgency.   Musculoskeletal: Negative.  Negative for arthralgias and myalgias.   Skin: Negative.    Allergic/Immunologic: Negative.    Neurological: Positive for dizziness, weakness and light-headedness. Negative for syncope.   Hematological: Negative.  Negative for adenopathy.   Psychiatric/Behavioral: Negative.  Negative for agitation, behavioral problems, confusion and decreased concentration.        Objective    Vital Signs  Temp:  [98.1 °F (36.7 °C)-98.7 °F (37.1 °C)] 98.1 °F (36.7 °C)  Heart Rate:  [81-91] 81  Resp:  [14-18] 14  BP: ()/(45-85) 145/75  SpO2:  [91 %-99 %] 97 %  on  Flow (L/min):  [2] 2;   Device (Oxygen Therapy): nasal cannula  Body mass index is 37.1 kg/m².    Physical Exam   Constitutional: She is oriented to person, place, and time. She appears well-developed and well-nourished. No distress.   HENT:   Head: Normocephalic and atraumatic.   Eyes: EOM are normal. Pupils are equal, round, and reactive to light.   Neck: Normal range of motion. Neck supple.   Cardiovascular: Normal rate, regular rhythm and intact distal pulses.   No murmur heard.  Pulmonary/Chest: Effort normal. No respiratory distress. She has decreased breath sounds.   Abdominal: Soft. Bowel sounds are normal. There is tenderness (RLQ, RUQ, epigastric area). There is no rebound and no guarding.   Musculoskeletal: Normal range of motion. She exhibits no edema or tenderness.   Neurological: She is alert and oriented to person,  place, and time.   Skin: Skin is warm and dry. She is not diaphoretic.   Psychiatric: She has a normal mood and affect. Her behavior is normal. Judgment and thought content normal.   Nursing note and vitals reviewed.      Results Review:   I reviewed the patient's new clinical results including all labs and xrays.    Lab Results (last 24 hours)     Procedure Component Value Units Date/Time    POC Hemoglobin [[id number]]  (Abnormal) Collected:  11/19/18 1728    Specimen:  Blood Updated:  11/19/18 1728     Hemoglobin 5.5 g/dL     POC Glucose Once [[id number]]  (Abnormal) Collected:  11/19/18 1751    Specimen:  Blood Updated:  11/19/18 1757     Glucose 320 mg/dL     POC Glucose Once [[id number]]  (Abnormal) Collected:  11/19/18 1752    Specimen:  Blood Updated:  11/19/18 1752     Glucose 320 mg/dL     CBC & Differential [[id number]] Collected:  11/19/18 1807    Specimen:  Blood Updated:  11/19/18 1839    Narrative:       The following orders were created for panel order CBC & Differential.  Procedure                               Abnormality         Status                     ---------                               -----------         ------                     CBC Auto Differential[[id number]]        Abnormal            Final result                 Please view results for these tests on the individual orders.    Comprehensive Metabolic Panel [[id number]]  (Abnormal) Collected:  11/19/18 1807    Specimen:  Blood Updated:  11/19/18 1856     Glucose 299 mg/dL      BUN 25 mg/dL      Creatinine 1.74 mg/dL      Sodium 142 mmol/L      Potassium 4.7 mmol/L      Chloride 103 mmol/L      CO2 28.1 mmol/L      Calcium 8.7 mg/dL      Total Protein 7.7 g/dL      Albumin 3.50 g/dL      ALT (SGPT) 8 U/L      AST (SGOT) 9 U/L      Alkaline Phosphatase 99 U/L      Total Bilirubin 0.4 mg/dL      eGFR Non African Amer 28 mL/min/1.73      Globulin 4.2 gm/dL      A/G Ratio 0.8 g/dL      BUN/Creatinine Ratio 14.4     Anion Gap 10.9 mmol/L      Narrative:       The MDRD GFR formula is only valid for adults with stable renal function between ages 18 and 70.    Protime-INR [[id number]]  (Abnormal) Collected:  11/19/18 1807    Specimen:  Blood Updated:  11/19/18 1847     Protime 16.5 Seconds      INR 1.32    Narrative:       Therapeutic Ranges for INR: 2.0-3.0 (PT 20-30)                              2.5-3.5 (PT 25-34)    Troponin [[id number]]  (Normal) Collected:  11/19/18 1807    Specimen:  Blood Updated:  11/19/18 1856     Troponin T <0.010 ng/mL     Narrative:       Troponin T Reference Ranges:  Less than 0.03 ng/mL:    Negative for AMI  0.03 to 0.09 ng/mL:      Indeterminant for AMI  Greater than 0.09 ng/mL: Positive for AMI    CBC Auto Differential [[id number]]  (Abnormal) Collected:  11/19/18 1807    Specimen:  Blood Updated:  11/19/18 1839     WBC 6.48 10*3/mm3      RBC 2.12 10*6/mm3      Hemoglobin 6.9 g/dL      Hematocrit 21.5 %      .4 fL      MCH 32.5 pg      MCHC 32.1 g/dL      RDW 18.8 %      RDW-SD 66.7 fl      MPV 11.3 fL      Platelets 492 10*3/mm3      Neutrophil % 54.3 %      Lymphocyte % 32.7 %      Monocyte % 4.2 %      Eosinophil % 5.7 %      Basophil % 1.4 %      Immature Grans % 1.7 %      Neutrophils, Absolute 3.52 10*3/mm3      Lymphocytes, Absolute 2.12 10*3/mm3      Monocytes, Absolute 0.27 10*3/mm3      Eosinophils, Absolute 0.37 10*3/mm3      Basophils, Absolute 0.09 10*3/mm3      Immature Grans, Absolute 0.11 10*3/mm3      nRBC 0.0 /100 WBC     Urinalysis With Microscopic If Indicated (No Culture) - Urine, Catheter [[id number]]  (Normal) Collected:  11/19/18 1827    Specimen:  Urine, Catheter Updated:  11/19/18 1837     Color, UA Yellow     Appearance, UA Clear     pH, UA 5.5     Specific Gravity, UA 1.015     Glucose, UA Negative     Ketones, UA Negative     Bilirubin, UA Negative     Blood, UA Negative     Protein, UA Negative     Leuk Esterase, UA Negative     Nitrite, UA Negative     Urobilinogen, UA 0.2 E.U./dL     Narrative:       Urine microscopic not indicated.          No orders to display     Assessment/Plan      Active Hospital Problems    Diagnosis Date Noted   • **Acute blood loss anemia [D62] 11/19/2018   • 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) [E11.22, N18.3] 02/28/2017   • Chronic anemia [D64.9] 02/02/2016   • Hyperlipidemia [E78.5] 02/02/2016   • Hypertension [I10] 02/02/2016   • Obstructive sleep apnea syndrome [G47.33] 02/02/2016   • Chronic back pain [M54.9, G89.29] 02/02/2016      Resolved Hospital Problems   No resolved problems to display.     Acute blood loss anemia  -she does report red/orange stools in addition to increase in fatigue, shortness of breath, dizziness/lightheadedness  -hgb today 6.5 from 8.9 on DC from last admission with progressively worsening complaints  -was given 1 unit PRBC before transfer, will recheck hgb now and decide on further transfusion if necessary at that point  -check CBC q8 hrs  -GI consult  -NPO now  -IVF  -she will remain off Eliquis for now, and if source of bleeding cannot be identified then anticoagulation may not be an option for her    CKD3  -creatinine slightly elevated from baseline, 1.74 tonight from 1.54 in early November  -will give IVF  -avoid further nephrotoxins  -recheck labs in AM    HTN/HLD  -stable but will hold BP meds for now until source of bleeding identified and no further need for transfusions    VTE Ppx  -SCDs    CODE status  -full    I discussed the patients findings and my recommendations with patient and nursing staff.    REY Sanchez  11/19/18  11:14 PM    Addendum: I have reviewed the history and plan as obtained by REY Lagos. I have personally examined the patient. My exam confirms her physical findings and I agree with the plan as listed above, with the addition to the following:  What a nice woman!  Await GI eval and recs, consider tagged RBC scan versus capsule endoscopy as outpt    Terrell Murguia,  MD [facility]  11/19/18  11:54 PM

## 2018-11-20 NOTE — PROGRESS NOTES
Discharge Planning Assessment  [facility]     Patient Name: Joya Singh  MRN: [id number]  Today's Date: 11/20/2018    Admit Date: 11/19/2018    Discharge Needs Assessment     Row Name 11/20/18 1205       Living Environment    Lives With  child(isabel), adult    Name(s) of Who Lives With Patient  sonIsaac, [phone], and daughter-in-law    Current Living Arrangements  home/apartment/condo    Primary Care Provided by  self    Provides Primary Care For  no one    Family Caregiver if Needed  child(isabel), adult    Family Caregiver Names  Isaac harris and Serafin Singh, and daughter-in-law    Quality of Family Relationships  helpful;involved;supportive    Able to Return to Prior Arrangements  yes       Resource/Environmental Concerns    Resource/Environmental Concerns  none    Transportation Concerns  car, none       Transition Planning    Patient/Family Anticipates Transition to  home with help/services;home with family    Patient/Family Anticipated Services at Transition  home health care    Transportation Anticipated  family or friend will provide       Discharge Needs Assessment    Concerns to be Addressed  discharge planning    Equipment Currently Used at Home  glucometer;bipap/cpap;oxygen;walker, rolling;wheelchair;cane, straight    Outpatient/Agency/Support Group Needs  homecare agency    Discharge Facility/Level of Care Needs  home with home health    Discharge Coordination/Progress  [facility]        Discharge Plan     Row Name 11/20/18 1207       Plan    Plan  Home with [facility] and family assist    Patient/Family in Agreement with Plan  yes    Plan Comments  IMM letter checked. CCP met with pt to discuss d/c planning. Facesheet verified. CCP role explained. Pt resides with her son (Isaac Singh, [phone]) and daughter-in-law in a single level home with three exterior steps. Pt uses cane/walker/wheelchair, mostly walker, for mobility, and has CPAP and nocturnal O2 supplied by [facility]. Pt reports current home  health via [facility] [place] (confirmed via Billie who is following). Pt denies past sub-acute rehab. Pt confirms pharmacy is [facility] in [place], [place]. Pt plans to return home with continued [facility] [place] services. CCP to follow to assist should additional d/c needs arise. Cecily Lloyd LCSW        Destination      No service coordination in this encounter.      Durable Medical Equipment - Selection Complete      Service Provider Request Status Selected Services Address Phone Number Fax Number    [facility] Selected Durable Medical Equipment [address] [place] [address] [phone]       Haleigh Lloyd LCSW 11/20/2018 1209    Current for nocturnal O2                 Dialysis/Infusion      No service coordination in this encounter.      Home Medical Care - Selection Complete      Service Provider Request Status Selected Services Address Phone Number Fax Number    [facility] Selected Home Health Services [address] [address] [phone]      Community Resources      No service coordination in this encounter.          Demographic Summary     Row Name 11/20/18 1204       General Information    Admission Type  inpatient    Arrived From  home    Required Notices Provided  Important Message from Medicare    Referral Source  admission list    Reason for Consult  discharge planning    Preferred Language  English        Functional Status     Row Name 11/20/18 1204       Functional Status    Usual Activity Tolerance  good    Current Activity Tolerance  moderate       Functional Status, IADL    Medications  assistive equipment    Meal Preparation  assistive equipment    Housekeeping  assistive equipment    Laundry  assistive equipment    Shopping  assistive equipment       Mental Status Summary    Recent Changes in Mental Status/Cognitive Functioning  no changes        Psychosocial    No documentation.        Abuse/Neglect    No documentation.       Legal    No documentation.       Substance Abuse    No documentation.       Patient Forms    No documentation.           Haleigh Lloyd LCSW

## 2018-11-20 NOTE — CONSULTS
[facility]                                                                                             Nephrology Initial Consult Note    Patient Identification:  Name: Joya Singh MRN: [id number]  Age: 78 y.o. : 1940  Sex: female  Date:2018    Requesting Physician: As per consult order.  Reason for Consultation: Chronic kidney disease, anemia of chronic kidney disease  Information from:patient/ family/ chart      History of Present Illness: This is a 78 y.o. year old female  with a history of stage III chronic kidney disease, followed by me in the office, baseline creatinine 1.6-1.8.  She presented to emergency department yesterday evening complaining of dizziness and lightheadedness, recent hospitalization for a GI bleed in [place] several weeks ago with no clear source identified, attributed to Eliquis, status post transfusion at that time.  Hemoglobin was 5.5 and her primary care physician's office yesterday and she was sent to the emergency department where hemoglobin was rechecked and was 6.5 and she was again transfused overnight.  Creatinine this morning was 1.7.  She's currently on IV fluids but her diet has been started.  Blood pressures been stable she's on 2 L home oxygen, denies any current shortness of breath or chest pain, no nausea or vomiting, no edema or dysuria.  Nomelena hematochezia or hematemesis since admission.      The following medical history and medications personally reviewed by me:    Problem List:   Patient Active Problem List    Diagnosis   • *Acute blood loss anemia [D62]   • Community acquired pneumonia of left lung [J18.9]   • Gastrointestinal hemorrhage [K92.2]   • Right carpal tunnel syndrome [G56.01]   • Tendinitis of right rotator cuff [M75.81]   • AC joint arthropathy [M19.019]   • Subacromial impingement of right shoulder [M75.41]   • Hyperkalemia [E87.5]    • Low blood pressure [I95.9]   • Hypoxia [R09.02]   • Primary osteoarthritis of left knee [M17.12]   • Complex tear of medial meniscus of left knee as current injury [S83.232A]   • Insufficiency fracture of tibia [M84.469A]   • 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) [E11.22, N18.3]   • Anxiety [F41.9]   • Gastroesophageal reflux disease [K21.9]   • History of biliary T-tube placement [Z98.890]   • Acquired hypothyroidism [E03.9]   • Primary localized osteoarthrosis of left shoulder region [M19.012]   • Rotator cuff tendonitis [M75.80]   • Diabetic gastroparesis (CMS/HCC) [E11.43, K31.84]   • Warfarin anticoagulation [Z79.01]   • Deep vein thrombosis of lower extremity (CMS/HCC) [I82.409]   • Arthritis [M19.90]   • Chronic back pain [M54.9, G89.29]   • Chronic anemia [D64.9]   • Type 2 diabetes mellitus with neurologic complication (CMS/HCC) [E11.49]   • 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]   • Dysuria [R30.0]   • Hyperlipidemia [E78.5]   • Hypertension [I10]   • Insomnia with sleep apnea [G47.00, G47.30]   • Spasm [R25.2]   • Obstructive sleep apnea syndrome [G47.33]   • Peripheral neuropathy [G62.9]   • Speech disturbance [R47.9]       Past Medical History:  Past Medical History:   Diagnosis Date   • Allergic rhinitis    • Anxiety    • Arthritis    • Bell's palsy    • CKD (chronic kidney disease) stage 3, GFR 30-59 ml/min (CMS/HCC)    • Depression    • Diabetes mellitus [place] [place]     LAST A1C 6   • Diabetic gastroparesis (CMS/HCC) 2/19/2016   • GERD (gastroesophageal reflux disease)    • GI bleed    • H/O blood clots     LEFT LEG 7 OR 8 YEARS AGO   • History of transfusion    • Hyperlipidemia    • Hypertension    • Hypothyroidism    • Normal coronary arteries     by cath 2013   • AARON (obstructive sleep apnea)     DOESNT WEAR REGULARLY   • PONV (postoperative nausea and vomiting)    • Skin cancer    • Stroke (CMS/HCC)    • TIA (transient ischemic attack)     LAST TIA JULY 2017       Past  Surgical History:  Past Surgical History:   Procedure Laterality Date   • BACK SURGERY      HARDWARE   • CHOLECYSTECTOMY      OPEN   • COLONOSCOPY  2011    due for repeat in 2021   • HYSTERECTOMY      PARTIAL    • NECK SURGERY     • SPINE SURGERY     • UPPER GASTROINTESTINAL ENDOSCOPY  2014    gastritis.  done by dr. hastings        Home Meds:   Medications Prior to Admission   Medication Sig Dispense Refill Last Dose   • acetaminophen (TYLENOL) 325 MG tablet Take 2 tablets by mouth Every 6 (Six) Hours As Needed for Mild Pain . OTC product 40 tablet 0 11/18/2018 at Unknown time   • atorvastatin (LIPITOR) 10 MG tablet Take 10 mg by mouth Every Night.   11/18/2018 at 2100   • DULoxetine (CYMBALTA) 30 MG capsule Take 30 mg by mouth 2 (Two) Times a Day.   11/19/2018 at Unknown time   • ELIQUIS 2.5 MG tablet tablet TAKE ONE (1) TABLET ORALLY (BY MOUTH) TWICE DAILY 60 tablet 1 11/19/2018 at Unknown time   • furosemide (LASIX) 20 MG tablet Take 1 tablet by mouth Daily.   11/19/2018 at Unknown time   • gabapentin (NEURONTIN) 400 MG capsule Take 400 mg by mouth 3 (Three) Times a Day. 400 mg AM, 400 mg NOON, 800 mg QHS   11/19/2018 at Unknown time   • insulin aspart (novoLOG FLEXPEN) 100 UNIT/ML solution pen-injector sc pen Inject 30 Units under the skin into the appropriate area as directed Every Morning Before Breakfast. 30 units with breakfast 35 units with lunch 35 units with dinner   11/19/2018 at Unknown time   • insulin aspart (novoLOG) 100 UNIT/ML injection Inject 35 Units under the skin into the appropriate area as directed Daily Before Lunch. 30 units with breakfast 35 units with lunch 35 units with dinner   11/19/2018 at Unknown time   • insulin aspart (novoLOG) 100 UNIT/ML injection Inject 35 Units under the skin into the appropriate area as directed Daily Before Supper. 30 units with breakfast 35 units with lunch 35 units with dinner   11/19/2018 at Unknown time   • Insulin Degludec (TRESIBA FLEXTOUCH) 200 UNIT/ML  "solution pen-injector Inject 50 Units under the skin into the appropriate area as directed every night at bedtime.   11/18/2018   • levothyroxine (SYNTHROID) 88 MCG tablet Take 1 tablet by mouth Daily. 30 tablet 3 11/19/2018 at Unknown time   • midodrine (PROAMATINE) 2.5 MG tablet Take 1 tablet by mouth 2 (Two) Times a Day. (Patient taking differently: Take 2.5 mg by mouth 2 (Two) Times a Day. Takes QAM and NOON) 90 tablet 1 11/19/2018 at Unknown time   • O2 (OXYGEN) Inhale 2 L/min Every Night. Uses 2L  overnight only   Taking   • omeprazole (priLOSEC) 40 MG capsule Take one (1) capsule orally (by mouth) once daily 90 capsule 1 11/19/2018 at Unknown time   • potassium chloride (MICRO-K) 10 MEQ CR capsule Take 10 mEq by mouth Every Morning.   11/19/2018 at Unknown time   • ACCU-CHEK FASTCLIX LANCETS misc TEST 3-4 TIMES DAILY AS DIRECTED 400 each 3 Taking   • ACCU-CHEK SMARTVIEW test strip TEST blood sugar three times daily OR AS DIRECTED 300 each 3 Taking   • Alcohol Swabs (B-D SINGLE USE SWABS REGULAR) pads    Taking   • Blood Glucose Monitoring Suppl (ACCU-CHEK KENNETH SMARTVIEW) w/Device kit TEST blood sugar three times daily or as directed 1 kit 0 Taking   • gabapentin (NEURONTIN) 400 MG capsule Take 400 mg by mouth every night at bedtime. 400 mg AM, 400 mg NOON, 800 mg QHS   11/18/2018   • Needle, Disp, (BD DISP NEEDLES) 30G X 1/2\" misc To be used 3 times daily with Novolog Flexpen. 100 each 5 Taking   • promethazine (PHENERGAN) 12.5 MG tablet 1/2 to 1 tablet every 6 hours as needed for nausea 20 tablet 0 Unknown at Unknown time       Current Meds:   Current Facility-Administered Medications   Medication Dose Route Frequency Provider Last Rate Last Dose   • acetaminophen (TYLENOL) tablet 650 mg  650 mg Oral Q4H PRN Freestone, Izzy E, APRN       • dextrose (D50W) 25 g/ 50mL Intravenous Solution 25 g  25 g Intravenous Q15 Min PRN Umer, Izzy E, APRN       • dextrose (GLUTOSE) oral gel 15 g  15 g Oral Q15 Min PRN Umer, " Izzy DESHPANDE, APRN       • glucagon (human recombinant) (GLUCAGEN DIAGNOSTIC) injection 1 mg  1 mg Subcutaneous PRN Izzy Owusu, APRN       • insulin lispro (humaLOG) injection 0-7 Units  0-7 Units Subcutaneous 4x Daily With Meals & Nightly Izzy Owusu, APRN       • nitroglycerin (NITROSTAT) SL tablet 0.4 mg  0.4 mg Sublingual Q5 Min PRN Izzy Owusu, APRN       • ondansetron (ZOFRAN) tablet 4 mg  4 mg Oral Q6H PRN Izzy Owusu, APRN        Or   • ondansetron ODT (ZOFRAN-ODT) disintegrating tablet 4 mg  4 mg Oral Q6H PRN Izzy Owusu APRN        Or   • ondansetron (ZOFRAN) injection 4 mg  4 mg Intravenous Q6H PRN Izzy Owusu, APRN   4 mg at 11/20/18 0026   • sodium chloride 0.9 % flush 1-10 mL  1-10 mL Intravenous PRN Izzy Owusu, APRN       • sodium chloride 0.9 % flush 3 mL  3 mL Intravenous Q12H Izzy Owusu, APRN   3 mL at 11/20/18 0838       Allergies:  Allergies   Allergen Reactions   • Lisinopril Cough   • Baclofen Anxiety     Panic attack, nightmares   • Codeine Itching and Rash   • Morphine Hives   • Penicillins Rash     Tolerates cephalosporins        Social History:   Social History     Socioeconomic History   • Marital status:      Spouse name: Not on file   • Number of children: Not on file   • Years of education: Not on file   • Highest education level: Not on file   Tobacco Use   • Smoking status: Never Smoker   • Smokeless tobacco: Never Used   • Tobacco comment: CAFFEINE USE: NONE   Substance and Sexual Activity   • Alcohol use: No   • Drug use: No   • Sexual activity: Defer     Comment: EXERCISE - RARELY        Family History:  Family History   Problem Relation Age of Onset   • Lupus Mother    • Heart failure Mother 59   • Heart disease Other    • Hypertension Other    • Heart attack Father         Review of Systems: as per HPI, in addition:    General:      + weakness / fatigue,                       No fevers / chills                       no weight loss  HEENT:       no  "dysphagia / odynophagia  Neck:           normal range of motion, no swelling  Respiratory: no cough / congestion                      No shortness of air                       No wheezing  CV:              No chest pain                       No palpitations  Abdomen/GI: no nausea / vomiting                      No diarrhea / constipation                      No abdominal pain  :             no dysuria / urinary frequency                       No urgency, normal output  Endocrine:   no polyuria / polydipsia,                      No heat or cold intolerance  Skin:           no rashes or skin breakdown   Vascular:   No edema                     No claudication  Psych:        no depression/ anxiety  Neuro:        no focal weakness, no seizures  Musculoskeletal: no joint pain or deformities      Physical Exam:  Vitals:   Temp (24hrs), Av.1 °F (36.7 °C), Min:97.6 °F (36.4 °C), Max:98.7 °F (37.1 °C)    /58 (BP Location: Right arm, Patient Position: Lying)   Pulse 74   Temp 97.9 °F (36.6 °C) (Oral)   Resp 16   Ht 157.5 cm (62.01\")   Wt 92 kg (202 lb 14.4 oz)   SpO2 96%   BMI 37.10 kg/m²   Intake/Output:     Intake/Output Summary (Last 24 hours) at 2018 1533  Last data filed at 2018 0246  Gross per 24 hour   Intake --   Output 350 ml   Net -350 ml        Wt Readings from Last 1 Encounters:   18 2238 92 kg (202 lb 14.4 oz)       Exam:    General Appearance:  Awake, alert, oriented x3, no acute distress  well-appearing, obese   Head and Face:  Normocephalic, atraumatic, mucus membranes moist, oropharynx clear   Eyes:  No icterus, pupils equal round and reactive to light, extraocular movements intact    ENMT: Moist mucosa, tongue symmetric    Neck: Supple  no jugular venous distention  no thyromegaly   Pulmonary:  Respiratory effort: Normal  Auscultation of lungs: Clear bilaterally  No wheezes  No rhonchi  Good air movement, good expansion   Chest wall:  No tenderness or deformity "   Cardiovascular:  Auscultation of the heart: Normal rhythm, no murmurs  No edema of extremities    Abdomen:  Abdomen: soft, non-tender, normal bowel sounds all four quadrants, no masses   Liver and spleen: no hepatosplenomegaly   Musculoskeletal: Digits and nails: normal  Normal range of motion  No joint swelling or gross deformities    Skin: Skin inspection: color normal, no visible rashes or lesions  Skin palpation: texture, turgor normal, no palpable lesions   Lymphatic:  no cervical lymphadenopathy    Psychiatric: Judgement and insight: normal  Orientation to person place and time: normal  Mood and affect: normal       DATA:  Radiology and Labs:  The following labs independently reviewed by me  Old records independently reviewed showing stage III chronic kidney disease, baseline creatinine 1.6-1.8  The following radiologic studies independently viewed by me, findings chest x-ray no focal findings, recent CT abdomen pelvis show a mildly atrophic kidneys otherwise unremarkable exam.  Interval notes, chart personally reviewed by me.   New problems include GIB, anemia  Discussed with pt  Platelet count nl        Labs:   Recent Results (from the past 24 hour(s))   POC Hemoglobin    Collection Time: 11/19/18  5:28 PM   Result Value Ref Range    Hemoglobin 5.5 g/dL   POC Glucose Once    Collection Time: 11/19/18  5:51 PM   Result Value Ref Range    Glucose 320 (H) 70 - 130 mg/dL   POC Glucose Once    Collection Time: 11/19/18  5:52 PM   Result Value Ref Range    Glucose 320 (A) 70 - 130 mg/dL   Comprehensive Metabolic Panel    Collection Time: 11/19/18  6:07 PM   Result Value Ref Range    Glucose 299 (H) 65 - 99 mg/dL    BUN 25 (H) 8 - 23 mg/dL    Creatinine 1.74 (H) 0.57 - 1.00 mg/dL    Sodium 142 136 - 145 mmol/L    Potassium 4.7 3.5 - 5.2 mmol/L    Chloride 103 98 - 107 mmol/L    CO2 28.1 22.0 - 29.0 mmol/L    Calcium 8.7 (L) 8.8 - 10.5 mg/dL    Total Protein 7.7 6.0 - 8.5 g/dL    Albumin 3.50 3.50 - 5.20 g/dL     ALT (SGPT) 8 5 - 33 U/L    AST (SGOT) 9 5 - 32 U/L    Alkaline Phosphatase 99 40 - 129 U/L    Total Bilirubin 0.4 0.2 - 1.2 mg/dL    eGFR Non African Amer 28 (L) >60 mL/min/1.73    Globulin 4.2 gm/dL    A/G Ratio 0.8 g/dL    BUN/Creatinine Ratio 14.4 7.0 - 25.0    Anion Gap 10.9 mmol/L   Protime-INR    Collection Time: 11/19/18  6:07 PM   Result Value Ref Range    Protime 16.5 (H) 12.1 - 15.0 Seconds    INR 1.32 (H) 0.90 - 1.10   Troponin    Collection Time: 11/19/18  6:07 PM   Result Value Ref Range    Troponin T <0.010 0.000 - 0.030 ng/mL   Type & Screen    Collection Time: 11/19/18  6:07 PM   Result Value Ref Range    ABO Type B     RH type Positive     Antibody Screen Negative     T&S Expiration Date 11/22/2018 11:59:59 PM    CBC Auto Differential    Collection Time: 11/19/18  6:07 PM   Result Value Ref Range    WBC 6.48 4.80 - 10.80 10*3/mm3    RBC 2.12 (L) 4.20 - 5.40 10*6/mm3    Hemoglobin 6.9 (C) 12.0 - 16.0 g/dL    Hematocrit 21.5 (C) 37.0 - 47.0 %    .4 (H) 81.0 - 99.0 fL    MCH 32.5 (H) 27.0 - 31.0 pg    MCHC 32.1 31.0 - 37.0 g/dL    RDW 18.8 (H) 11.5 - 14.5 %    RDW-SD 66.7 (H) 37.0 - 54.0 fl    MPV 11.3 (H) 7.4 - 10.4 fL    Platelets 492 140 - 500 10*3/mm3    Neutrophil % 54.3 45.0 - 70.0 %    Lymphocyte % 32.7 20.0 - 45.0 %    Monocyte % 4.2 3.0 - 8.0 %    Eosinophil % 5.7 (H) 0.0 - 4.0 %    Basophil % 1.4 0.0 - 2.0 %    Immature Grans % 1.7 (H) 0.0 - 0.5 %    Neutrophils, Absolute 3.52 1.50 - 8.30 10*3/mm3    Lymphocytes, Absolute 2.12 0.60 - 4.80 10*3/mm3    Monocytes, Absolute 0.27 0.00 - 1.00 10*3/mm3    Eosinophils, Absolute 0.37 (H) 0.10 - 0.30 10*3/mm3    Basophils, Absolute 0.09 0.00 - 0.20 10*3/mm3    Immature Grans, Absolute 0.11 (H) 0.00 - 0.03 10*3/mm3    nRBC 0.0 0.0 - 0.0 /100 WBC   Urinalysis With Microscopic If Indicated (No Culture) - Urine, Catheter    Collection Time: 11/19/18  6:27 PM   Result Value Ref Range    Color, UA Yellow Yellow, Straw    Appearance, UA Clear Clear     pH, UA 5.5 4.5 - 8.0    Specific Gravity, UA 1.015 1.003 - 1.030    Glucose, UA Negative Negative    Ketones, UA Negative Negative, 80 mg/dL (3+), >=160 mg/dL (4+)    Bilirubin, UA Negative Negative    Blood, UA Negative Negative    Protein, UA Negative Negative    Leuk Esterase, UA Negative Negative    Nitrite, UA Negative Negative    Urobilinogen, UA 0.2 E.U./dL 0.2 - 1.0 E.U./dL   Prepare RBC, 1 Units    Collection Time: 11/19/18  7:48 PM   Result Value Ref Range    Product Code H1528K83     Unit Number [id number]     UNIT  ABO B     UNIT  RH POS     Dispense Status IS     Blood Type [facility]     Blood Expiration Date 650099306603     Blood Type Barcode 7300    CBC Auto Differential    Collection Time: 11/19/18 11:58 PM   Result Value Ref Range    WBC 6.02 4.50 - 10.70 10*3/mm3    RBC 2.49 (L) 3.90 - 5.20 10*6/mm3    Hemoglobin 7.9 (L) 11.9 - 15.5 g/dL    Hematocrit 24.5 (L) 35.6 - 45.5 %    MCV 98.4 (H) 80.5 - 98.2 fL    MCH 31.7 26.9 - 32.0 pg    MCHC 32.2 (L) 32.4 - 36.3 g/dL    RDW 18.3 (H) 11.7 - 13.0 %    RDW-SD 63.2 (H) 37.0 - 54.0 fl    MPV 10.8 6.0 - 12.0 fL    Platelets 411 140 - 500 10*3/mm3    Neutrophil % 46.5 42.7 - 76.0 %    Lymphocyte % 42.2 19.6 - 45.3 %    Monocyte % 3.7 (L) 5.0 - 12.0 %    Eosinophil % 6.3 (H) 0.3 - 6.2 %    Basophil % 1.3 0.0 - 1.5 %    Immature Grans % 0.2 0.0 - 0.5 %    Neutrophils, Absolute 2.80 1.90 - 8.10 10*3/mm3    Lymphocytes, Absolute 2.54 0.90 - 4.80 10*3/mm3    Monocytes, Absolute 0.22 0.20 - 1.20 10*3/mm3    Eosinophils, Absolute 0.38 0.00 - 0.70 10*3/mm3    Basophils, Absolute 0.08 0.00 - 0.20 10*3/mm3    Immature Grans, Absolute 0.01 0.00 - 0.03 10*3/mm3    nRBC 0.0 0.0 - 0.0 /100 WBC   Iron Profile    Collection Time: 11/19/18 11:58 PM   Result Value Ref Range    Iron 178 (H) 37 - 145 mcg/dL    Iron Saturation 78 (H) 20 - 50 %    Transferrin 153 (L) 200 - 360 mg/dL    TIBC 228 mcg/dL   Vitamin B12    Collection Time: 11/19/18 11:58 PM   Result Value Ref Range     Vitamin B-12 397 211 - 946 pg/mL   Folate    Collection Time: 11/19/18 11:58 PM   Result Value Ref Range    Folate >20.00 4.78 - 24.20 ng/mL   Basic Metabolic Panel    Collection Time: 11/20/18  3:39 AM   Result Value Ref Range    Glucose 144 (H) 65 - 99 mg/dL    BUN 21 8 - 23 mg/dL    Creatinine 1.74 (H) 0.57 - 1.00 mg/dL    Sodium 144 136 - 145 mmol/L    Potassium 4.5 3.5 - 5.2 mmol/L    Chloride 104 98 - 107 mmol/L    CO2 28.3 22.0 - 29.0 mmol/L    Calcium 8.8 8.6 - 10.5 mg/dL    eGFR Non African Amer 28 (L) >60 mL/min/1.73    BUN/Creatinine Ratio 12.1 7.0 - 25.0    Anion Gap 11.7 mmol/L   Hemoglobin A1c    Collection Time: 11/20/18  3:39 AM   Result Value Ref Range    Hemoglobin A1C 7.43 (H) 4.80 - 5.60 %   Hemoglobin & Hematocrit, Blood    Collection Time: 11/20/18  3:39 AM   Result Value Ref Range    Hemoglobin 7.6 (L) 11.9 - 15.5 g/dL    Hematocrit 23.6 (L) 35.6 - 45.5 %   Reticulocytes    Collection Time: 11/20/18  3:39 AM   Result Value Ref Range    Reticulocyte % 1.86 (H) 0.50 - 1.50 %   POC Glucose Once    Collection Time: 11/20/18  6:09 AM   Result Value Ref Range    Glucose 142 (H) 70 - 130 mg/dL       Radiology:  Imaging Results (last 24 hours)     ** No results found for the last 24 hours. **               ASSESSMENT:   Problem List:   Chronic kidney disease stage III, fairly stable and near baseline, IV fluids currently infusing but had been discontinued now that diet has been restarted  Acute on chronic anemia, stable so component of anemia related to chronic kidney disease.  Iron studies were adequate today, recheck panel ordered for tomorrow morning.  Likely would benefit from chronic Epogen therapy  Acute blood loss anemia, on Eliquis  Type 2 diabetes mellitus  Hypertension  Obstructive sleep apnea  Obesity        PLAN:   Discontinue IV fluids  Renal function looks close to her baseline levels and  volume and electrolytes stable today  Await repeat iron studies, the initial panel was likely  drawn around the same time as a blood transfusion and likely is not accurate.  She would benefit from chronic, outpatient Epogen to help stabilize hgb levels and prevent future transfusions. Start 20,000 units subcutaneous weekly while here and will arrange with CCP for this to be given at the infusion center after discharge  Urine studies unremarkable, repeat labs in a.m.    Continue to monitor electrolytes and volume closely    I appreciate the opportunity to participate in this patient's care.  Please call with any questions or concerns.     Yasmani Baugh M.D  [facility]  Office phone number: [phone]  Answering service phone number: [phone]        11/20/2018        Dictation via Dragon dictation software

## 2018-11-20 NOTE — PLAN OF CARE
Problem: Patient Care Overview  Goal: Plan of Care Review  Outcome: Ongoing (interventions implemented as appropriate)   11/20/18 0223   Coping/Psychosocial   Plan of Care Reviewed With patient   Plan of Care Review   Progress improving   OTHER   Outcome Summary Pt arrived from [facility] with anemia. 1 unit of blood transfused by [place] [facility]. Pt has c/o of dizziness, nausea and weakness. PRN zofran given no c/o of pain. NSR on the monitor. WCM     Goal: Individualization and Mutuality  Outcome: Ongoing (interventions implemented as appropriate)    Goal: Discharge Needs Assessment  Outcome: Ongoing (interventions implemented as appropriate)    Goal: Interprofessional Rounds/Family Conf  Outcome: Ongoing (interventions implemented as appropriate)      Problem: Fall Risk (Adult)  Goal: Identify Related Risk Factors and Signs and Symptoms  Outcome: Ongoing (interventions implemented as appropriate)    Goal: Absence of Fall  Outcome: Ongoing (interventions implemented as appropriate)      Problem: Nausea/Vomiting (Adult)  Goal: Identify Related Risk Factors and Signs and Symptoms  Outcome: Ongoing (interventions implemented as appropriate)    Goal: Symptom Relief  Outcome: Ongoing (interventions implemented as appropriate)    Goal: Adequate Hydration  Outcome: Ongoing (interventions implemented as appropriate)      Problem: Anemia (Adult)  Goal: Identify Related Risk Factors and Signs and Symptoms  Outcome: Ongoing (interventions implemented as appropriate)

## 2018-11-20 NOTE — NURSING NOTE
GI consult called into Dr. Rogers's office as requested by Dr. Cotter. Patient has been seen previously by Dr. Rogers.

## 2018-11-21 ENCOUNTER — APPOINTMENT (OUTPATIENT)
Dept: GENERAL RADIOLOGY | Facility: HOSPITAL | Age: 78
End: 2018-11-21
Attending: INTERNAL MEDICINE

## 2018-11-21 LAB
ANISOCYTOSIS BLD QL: ABNORMAL
CLUMPED PLATELETS: PRESENT
DEPRECATED RDW RBC AUTO: 63.7 FL (ref 37–54)
EOSINOPHIL # BLD MANUAL: 0.58 10*3/MM3 (ref 0–0.7)
EOSINOPHIL NFR BLD MANUAL: 9 % (ref 0.3–6.2)
ERYTHROCYTE [DISTWIDTH] IN BLOOD BY AUTOMATED COUNT: 18.4 % (ref 11.7–13)
FERRITIN SERPL-MCNC: 338.6 NG/ML (ref 13–150)
GLUCOSE BLDC GLUCOMTR-MCNC: 153 MG/DL (ref 70–130)
GLUCOSE BLDC GLUCOMTR-MCNC: 173 MG/DL (ref 70–130)
GLUCOSE BLDC GLUCOMTR-MCNC: 224 MG/DL (ref 70–130)
GLUCOSE BLDC GLUCOMTR-MCNC: 242 MG/DL (ref 70–130)
GLUCOSE BLDC GLUCOMTR-MCNC: 272 MG/DL (ref 70–130)
HCT VFR BLD AUTO: 24.6 % (ref 35.6–45.5)
HGB BLD-MCNC: 7.9 G/DL (ref 11.9–15.5)
HGB RETIC QN: 40.2 PG (ref 32.7–38.6)
IMM RETICS NFR: 18.1 % (ref 0.7–13.7)
IRON 24H UR-MRATE: 53 MCG/DL (ref 37–145)
IRON SATN MFR SERPL: 24 % (ref 20–50)
LYMPHOCYTES # BLD MANUAL: 1.49 10*3/MM3 (ref 0.9–4.8)
LYMPHOCYTES NFR BLD MANUAL: 23 % (ref 19.6–45.3)
LYMPHOCYTES NFR BLD MANUAL: 4 % (ref 5–12)
MCH RBC QN AUTO: 31.7 PG (ref 26.9–32)
MCHC RBC AUTO-ENTMCNC: 32.1 G/DL (ref 32.4–36.3)
MCV RBC AUTO: 98.8 FL (ref 80.5–98.2)
MONOCYTES # BLD AUTO: 0.26 10*3/MM3 (ref 0.2–1.2)
NEUTROPHILS # BLD AUTO: 4.02 10*3/MM3 (ref 1.9–8.1)
NEUTROPHILS NFR BLD MANUAL: 62 % (ref 42.7–76)
NRBC SPEC MANUAL: 1 /100 WBC (ref 0–0)
PLATELET # BLD AUTO: 412 10*3/MM3 (ref 140–500)
PMV BLD AUTO: 10.8 FL (ref 6–12)
POLYCHROMASIA BLD QL SMEAR: ABNORMAL
RBC # BLD AUTO: 2.49 10*6/MM3 (ref 3.9–5.2)
RETICS/RBC NFR AUTO: 1.71 % (ref 0.5–1.5)
TIBC SERPL-MCNC: 218 MCG/DL (ref 298–536)
TRANSFERRIN SERPL-MCNC: 146 MG/DL (ref 200–360)
URATE SERPL-MCNC: 8.8 MG/DL (ref 2.4–5.7)
VARIANT LYMPHS NFR BLD MANUAL: 2 % (ref 0–5)
WBC MORPH BLD: NORMAL
WBC NRBC COR # BLD: 6.49 10*3/MM3 (ref 4.5–10.7)

## 2018-11-21 PROCEDURE — G8979 MOBILITY GOAL STATUS: HCPCS

## 2018-11-21 PROCEDURE — 82962 GLUCOSE BLOOD TEST: CPT

## 2018-11-21 PROCEDURE — 63710000001 INSULIN LISPRO (HUMAN) PER 5 UNITS: Performed by: NURSE PRACTITIONER

## 2018-11-21 PROCEDURE — 96372 THER/PROPH/DIAG INJ SC/IM: CPT

## 2018-11-21 PROCEDURE — 63510000001 EPOETIN ALFA PER 1000 UNITS: Performed by: INTERNAL MEDICINE

## 2018-11-21 PROCEDURE — 63710000001 INSULIN GLARGINE PER 5 UNITS: Performed by: HOSPITALIST

## 2018-11-21 PROCEDURE — 84466 ASSAY OF TRANSFERRIN: CPT | Performed by: HOSPITALIST

## 2018-11-21 PROCEDURE — 82728 ASSAY OF FERRITIN: CPT | Performed by: HOSPITALIST

## 2018-11-21 PROCEDURE — 83540 ASSAY OF IRON: CPT | Performed by: HOSPITALIST

## 2018-11-21 PROCEDURE — 85046 RETICYTE/HGB CONCENTRATE: CPT | Performed by: HOSPITALIST

## 2018-11-21 PROCEDURE — 84550 ASSAY OF BLOOD/URIC ACID: CPT | Performed by: INTERNAL MEDICINE

## 2018-11-21 PROCEDURE — 97162 PT EVAL MOD COMPLEX 30 MIN: CPT

## 2018-11-21 PROCEDURE — G0378 HOSPITAL OBSERVATION PER HR: HCPCS

## 2018-11-21 PROCEDURE — 85027 COMPLETE CBC AUTOMATED: CPT | Performed by: HOSPITALIST

## 2018-11-21 PROCEDURE — 97110 THERAPEUTIC EXERCISES: CPT

## 2018-11-21 PROCEDURE — 99232 SBSQ HOSP IP/OBS MODERATE 35: CPT | Performed by: INTERNAL MEDICINE

## 2018-11-21 PROCEDURE — 73630 X-RAY EXAM OF FOOT: CPT

## 2018-11-21 PROCEDURE — G8978 MOBILITY CURRENT STATUS: HCPCS

## 2018-11-21 PROCEDURE — 85007 BL SMEAR W/DIFF WBC COUNT: CPT | Performed by: HOSPITALIST

## 2018-11-21 PROCEDURE — 86140 C-REACTIVE PROTEIN: CPT | Performed by: ORTHOPAEDIC SURGERY

## 2018-11-21 RX ORDER — LEVOTHYROXINE SODIUM 88 UG/1
88 TABLET ORAL
Status: DISCONTINUED | OUTPATIENT
Start: 2018-11-21 | End: 2018-11-25 | Stop reason: HOSPADM

## 2018-11-21 RX ORDER — FUROSEMIDE 20 MG/1
20 TABLET ORAL DAILY
Status: DISCONTINUED | OUTPATIENT
Start: 2018-11-21 | End: 2018-11-25 | Stop reason: HOSPADM

## 2018-11-21 RX ORDER — DULOXETIN HYDROCHLORIDE 30 MG/1
30 CAPSULE, DELAYED RELEASE ORAL 2 TIMES DAILY
Status: DISCONTINUED | OUTPATIENT
Start: 2018-11-21 | End: 2018-11-25 | Stop reason: HOSPADM

## 2018-11-21 RX ORDER — POTASSIUM CHLORIDE 750 MG/1
10 CAPSULE, EXTENDED RELEASE ORAL EVERY MORNING
Status: DISCONTINUED | OUTPATIENT
Start: 2018-11-22 | End: 2018-11-25 | Stop reason: HOSPADM

## 2018-11-21 RX ORDER — PANTOPRAZOLE SODIUM 40 MG/1
40 TABLET, DELAYED RELEASE ORAL EVERY MORNING
Status: DISCONTINUED | OUTPATIENT
Start: 2018-11-22 | End: 2018-11-25 | Stop reason: HOSPADM

## 2018-11-21 RX ORDER — ATORVASTATIN CALCIUM 10 MG/1
10 TABLET, FILM COATED ORAL NIGHTLY
Status: DISCONTINUED | OUTPATIENT
Start: 2018-11-21 | End: 2018-11-25 | Stop reason: HOSPADM

## 2018-11-21 RX ORDER — GABAPENTIN 400 MG/1
400 CAPSULE ORAL 3 TIMES DAILY
Status: DISCONTINUED | OUTPATIENT
Start: 2018-11-21 | End: 2018-11-25 | Stop reason: HOSPADM

## 2018-11-21 RX ORDER — GABAPENTIN 400 MG/1
400 CAPSULE ORAL NIGHTLY
Status: DISCONTINUED | OUTPATIENT
Start: 2018-11-21 | End: 2018-11-25 | Stop reason: HOSPADM

## 2018-11-21 RX ORDER — INSULIN GLARGINE 100 [IU]/ML
35 INJECTION, SOLUTION SUBCUTANEOUS NIGHTLY
Status: DISCONTINUED | OUTPATIENT
Start: 2018-11-21 | End: 2018-11-23

## 2018-11-21 RX ADMIN — ERYTHROPOIETIN 20000 UNITS: 20000 INJECTION, SOLUTION INTRAVENOUS; SUBCUTANEOUS at 12:20

## 2018-11-21 RX ADMIN — INSULIN LISPRO 2 UNITS: 100 INJECTION, SOLUTION INTRAVENOUS; SUBCUTANEOUS at 07:58

## 2018-11-21 RX ADMIN — GABAPENTIN 400 MG: 400 CAPSULE ORAL at 20:33

## 2018-11-21 RX ADMIN — INSULIN GLARGINE 35 UNITS: 100 INJECTION, SOLUTION SUBCUTANEOUS at 22:44

## 2018-11-21 RX ADMIN — INSULIN LISPRO 3 UNITS: 100 INJECTION, SOLUTION INTRAVENOUS; SUBCUTANEOUS at 17:11

## 2018-11-21 RX ADMIN — GABAPENTIN 400 MG: 400 CAPSULE ORAL at 20:32

## 2018-11-21 RX ADMIN — SODIUM CHLORIDE, PRESERVATIVE FREE 3 ML: 5 INJECTION INTRAVENOUS at 20:40

## 2018-11-21 RX ADMIN — LEVOTHYROXINE SODIUM 88 MCG: 88 TABLET ORAL at 15:25

## 2018-11-21 RX ADMIN — ATORVASTATIN CALCIUM 10 MG: 10 TABLET, FILM COATED ORAL at 20:32

## 2018-11-21 RX ADMIN — INSULIN LISPRO 4 UNITS: 100 INJECTION, SOLUTION INTRAVENOUS; SUBCUTANEOUS at 12:19

## 2018-11-21 RX ADMIN — ACETAMINOPHEN 650 MG: 325 TABLET, FILM COATED ORAL at 01:37

## 2018-11-21 RX ADMIN — GABAPENTIN 400 MG: 400 CAPSULE ORAL at 14:35

## 2018-11-21 RX ADMIN — INSULIN LISPRO 3 UNITS: 100 INJECTION, SOLUTION INTRAVENOUS; SUBCUTANEOUS at 22:44

## 2018-11-21 RX ADMIN — FUROSEMIDE 20 MG: 20 TABLET ORAL at 15:25

## 2018-11-21 RX ADMIN — DULOXETINE HYDROCHLORIDE 30 MG: 30 CAPSULE, DELAYED RELEASE ORAL at 20:32

## 2018-11-21 RX ADMIN — SODIUM CHLORIDE, PRESERVATIVE FREE 3 ML: 5 INJECTION INTRAVENOUS at 09:37

## 2018-11-21 NOTE — PROGRESS NOTES
"   LOS: 2 days   Patient Care Team:  Chari Mercado MD as PCP - General  Chari Mercado MD as PCP - Family Medicine  Christos Rob MD as Surgeon (General Surgery)  German Wylie MD as Surgeon (Orthopedic Surgery)    Chief Complaint/ Reason for encounter: Chronic kidney disease, anemia management        Subjective     Medical history reviewed:  History of Present Illness    Subjective:  Symptoms:  Improved.  She reports weakness.  No shortness of breath or chest pain.  (No new complaints, feels well  Good appetite, no shortness of breath or nausea).    Diet:  Adequate intake.    Activity level: Returning to normal.    Pain:  She reports no pain.          History taken from: Patient and chart    Objective     Vital Signs  Temp:  [97.7 °F (36.5 °C)-98 °F (36.7 °C)] 98 °F (36.7 °C)  Heart Rate:  [74-92] 87  Resp:  [16] 16  BP: (127-156)/(52-75) 136/62       Wt Readings from Last 1 Encounters:   11/19/18 2238 92 kg (202 lb 14.4 oz)       Objective:  General Appearance:  Comfortable, well-appearing, in no acute distress and not in pain.    Vital signs: (most recent): Blood pressure 136/62, pulse 87, temperature 98 °F (36.7 °C), temperature source Oral, resp. rate 16, height 157.5 cm (62.01\"), weight 92 kg (202 lb 14.4 oz), SpO2 95 %.  Vital signs are normal.  No fever.    Output: Producing urine.    HEENT: Normal HEENT exam.    Lungs:  Normal effort and normal respiratory rate.  Breath sounds clear to auscultation.  She is not in respiratory distress.  No decreased breath sounds.    Heart: Normal rate.  Regular rhythm.  S1 normal.  No murmur.   Abdomen: Abdomen is soft.  Bowel sounds are normal.   There is no abdominal tenderness.     Extremities: Normal range of motion.    Pulses: Distal pulses are intact.    Neurological: Patient is alert and oriented to person, place and time.    Skin:  Warm and dry.  No rash or cyanosis.             Results Review:    Intake/Output:     Intake/Output " Summary (Last 24 hours) at 11/21/2018 1006  Last data filed at 11/21/2018 0837  Gross per 24 hour   Intake 480 ml   Output 325 ml   Net 155 ml         DATA:  Radiology and Labs:  The following labs independently reviewed by me. Additional labs ordered for tomorrow a.m.  Interval notes, chart personally reviewed by me.   Old records independently reviewed showing stage III chronic kidney disease, baseline creatinine 1.7      Labs:   Recent Results (from the past 24 hour(s))   POC Glucose Once    Collection Time: 11/20/18  4:31 PM   Result Value Ref Range    Glucose 100 70 - 130 mg/dL   Prepare RBC, 1 Units    Collection Time: 11/20/18  5:10 PM   Result Value Ref Range    Product Code [id number]     Unit Number [id number]     UNIT  ABO B     UNIT  RH POS     Dispense Status PT     Blood Type BPOS     Blood Expiration Date 729425066965     Blood Type Barcode 7300    POC Glucose Once    Collection Time: 11/20/18  8:34 PM   Result Value Ref Range    Glucose 178 (H) 70 - 130 mg/dL   Ferritin    Collection Time: 11/21/18  4:40 AM   Result Value Ref Range    Ferritin 338.60 (H) 13.00 - 150.00 ng/mL   Iron Profile    Collection Time: 11/21/18  4:40 AM   Result Value Ref Range    Iron 53 37 - 145 mcg/dL    Iron Saturation 24 20 - 50 %    Transferrin 146 (L) 200 - 360 mg/dL    TIBC 218 298 - 536 mcg/dL   Retic With IRF & RET-He    Collection Time: 11/21/18  4:40 AM   Result Value Ref Range    Immature Reticulocyte Fraction 18.1 (H) 0.7 - 13.7 %    Reticulocyte % 1.71 (H) 0.50 - 1.50 %    Reticulocyte Hgb 40.2 (H) 32.7 - 38.6 pg   CBC Auto Differential    Collection Time: 11/21/18  4:40 AM   Result Value Ref Range    WBC 6.49 4.50 - 10.70 10*3/mm3    RBC 2.49 (L) 3.90 - 5.20 10*6/mm3    Hemoglobin 7.9 (L) 11.9 - 15.5 g/dL    Hematocrit 24.6 (L) 35.6 - 45.5 %    MCV 98.8 (H) 80.5 - 98.2 fL    MCH 31.7 26.9 - 32.0 pg    MCHC 32.1 (L) 32.4 - 36.3 g/dL    RDW 18.4 (H) 11.7 - 13.0 %    RDW-SD 63.7 (H) 37.0 - 54.0 fl    MPV 10.8  6.0 - 12.0 fL    Platelets 412 140 - 500 10*3/mm3   Manual Differential    Collection Time: 11/21/18  4:40 AM   Result Value Ref Range    Neutrophil % 62.0 42.7 - 76.0 %    Lymphocyte % 23.0 19.6 - 45.3 %    Monocyte % 4.0 (L) 5.0 - 12.0 %    Eosinophil % 9.0 (H) 0.3 - 6.2 %    Atypical Lymphocyte % 2.0 0.0 - 5.0 %    Neutrophils Absolute 4.02 1.90 - 8.10 10*3/mm3    Lymphocytes Absolute 1.49 0.90 - 4.80 10*3/mm3    Monocytes Absolute 0.26 0.20 - 1.20 10*3/mm3    Eosinophils Absolute 0.58 0.00 - 0.70 10*3/mm3    nRBC 1.0 (H) 0.0 - 0.0 /100 WBC    Anisocytosis Mod/2+ None Seen    Polychromasia Mod/2+ None Seen    WBC Morphology Normal Normal    Clumped Platelets Present None Seen   Uric Acid    Collection Time: 11/21/18  4:40 AM   Result Value Ref Range    Uric Acid 8.8 (H) 2.4 - 5.7 mg/dL   POC Glucose Once    Collection Time: 11/21/18  6:06 AM   Result Value Ref Range    Glucose 173 (H) 70 - 130 mg/dL       Radiology:  Imaging Results (last 24 hours)     Procedure Component Value Units Date/Time    XR Foot 3+ View Left [[id number]] Updated:  11/21/18 0934             Medications have been reviewed:  Current Facility-Administered Medications   Medication Dose Route Frequency Provider Last Rate Last Dose   • acetaminophen (TYLENOL) tablet 650 mg  650 mg Oral Q4H PRN Izzy Owusu APRN   650 mg at 11/21/18 0137   • cyclobenzaprine (FLEXERIL) tablet 5 mg  5 mg Oral BID PRN Tigre Hoffman MD   5 mg at 11/20/18 2042   • dextrose (D50W) 25 g/ 50mL Intravenous Solution 25 g  25 g Intravenous Q15 Min PRN Izzy Owusu APRN       • dextrose (GLUTOSE) oral gel 15 g  15 g Oral Q15 Min PRN Izzy Owusu APRN       • glucagon (human recombinant) (GLUCAGEN DIAGNOSTIC) injection 1 mg  1 mg Subcutaneous PRN Izzy Owusu APRN       • insulin lispro (humaLOG) injection 0-7 Units  0-7 Units Subcutaneous 4x Daily With Meals & Nightly Izzy Owusu, REY   2 Units at 11/21/18 0758   • nitroglycerin (NITROSTAT) SL tablet  0.4 mg  0.4 mg Sublingual Q5 Min PRN [place], Izzy E, APRN       • ondansetron (ZOFRAN) tablet 4 mg  4 mg Oral Q6H PRN [place], Izzy E, APRN        Or   • ondansetron ODT (ZOFRAN-ODT) disintegrating tablet 4 mg  4 mg Oral Q6H PRN [place], Izzy E, APRN        Or   • ondansetron (ZOFRAN) injection 4 mg  4 mg Intravenous Q6H PRN [place], Izzy E, APRN   4 mg at 11/20/18 0026   • sodium chloride 0.9 % flush 1-10 mL  1-10 mL Intravenous PRN [place], Izzy E, APRN       • sodium chloride 0.9 % flush 3 mL  3 mL Intravenous Q12H [place], Izzy E, APRN   3 mL at 11/21/18 0937       ASSESSMENT:  Chronic kidney disease stage III, fairly stable and near baseline, off IV fluids, labs pending  Acute on chronic anemia, some component of anemia related to chronic kidney disease.  Iron studies  appear adequate  Acute blood loss anemia, off Eliquis) early  Type 2 diabetes mellitus  Hypertension  Obstructive sleep apnea  Obesity           PLAN:    Await labs, renal function has been stable and near usual baseline, volume and electrolytes stable today  repeat iron studies appear adequate  needs outpatient Epogen to help stabilize hgb levels and prevent future transfusions. Start 20,000 units subcutaneous weekly while here and will arrange with CCP for this to be given at the infusion center after discharge  Urine studies unremarkable, repeat labs in a.m.     Continue to monitor electrolytes and volume closely  Please call with any questions or concerns.         Yasmani Baugh MD   [facility]   Office phone number: [phone]  Answering service phone number: [phone]    11/21/18  10:04 AM      Dictation performed using Dragon dictation software

## 2018-11-21 NOTE — PROGRESS NOTES
Continued Stay Note  [facility]     Patient Name: Joya Singh  MRN: [id number]  Today's Date: 11/21/2018    Admit Date: 11/19/2018    Discharge Plan     Row Name 11/21/18 1423       Plan    Plan  Home with [employer]  and family assist    Patient/Family in Agreement with Plan  yes    Plan Comments  Met with pt at bedside who confirms plan to return home with [facility].  Noted that Dr. Baugh would like Epogen injections set up for weekly at [facility].  Pt requests [facility] [place].  Email sent to Kenzie LÓPEZ and Nan SHINE who will initiate precert for injections.  Spoke with [facility] [place] who request order faxed to them from Dr. Baugh's office.  Spoke with answering service who state Dr. Baugh's office is gone for day and will return on Monday.  CCP will follow up on Monday to make sure order sent to [facility] so they can schedule.  PATIENCE Perkins RN        Discharge Codes    No documentation.             Susan Perkins

## 2018-11-21 NOTE — PLAN OF CARE
Problem: Patient Care Overview  Goal: Plan of Care Review  Outcome: Ongoing (interventions implemented as appropriate)   11/21/18 9301   OTHER   Outcome Summary VSS. Patient continues to c/o left foot pain, mostly to second toe, toe noted to be reddened at joint. Hgb trending up. PT consulted and evaluated patient today. Will continue to monitor.     Goal: Individualization and Mutuality  Outcome: Ongoing (interventions implemented as appropriate)    Goal: Interprofessional Rounds/Family Conf  Outcome: Ongoing (interventions implemented as appropriate)      Problem: Fall Risk (Adult)  Goal: Identify Related Risk Factors and Signs and Symptoms  Outcome: Ongoing (interventions implemented as appropriate)    Goal: Absence of Fall  Outcome: Ongoing (interventions implemented as appropriate)      Problem: Nausea/Vomiting (Adult)  Goal: Identify Related Risk Factors and Signs and Symptoms  Outcome: Ongoing (interventions implemented as appropriate)    Goal: Symptom Relief  Outcome: Ongoing (interventions implemented as appropriate)    Goal: Adequate Hydration  Outcome: Ongoing (interventions implemented as appropriate)      Problem: Anemia (Adult)  Goal: Identify Related Risk Factors and Signs and Symptoms  Outcome: Ongoing (interventions implemented as appropriate)    Goal: Symptom Improvement  Outcome: Ongoing (interventions implemented as appropriate)

## 2018-11-21 NOTE — PLAN OF CARE
Problem: Patient Care Overview  Goal: Plan of Care Review  Outcome: Ongoing (interventions implemented as appropriate)   11/21/18 0349   Coping/Psychosocial   Plan of Care Reviewed With patient   Plan of Care Review   Progress improving   OTHER   Outcome Summary VSS. Nausea intermittent. C/o pain in toes aching when moving, tylenol given patient now asleep. Will monitor.      Goal: Individualization and Mutuality  Outcome: Ongoing (interventions implemented as appropriate)    Goal: Discharge Needs Assessment  Outcome: Ongoing (interventions implemented as appropriate)    Goal: Interprofessional Rounds/Family Conf  Outcome: Ongoing (interventions implemented as appropriate)      Problem: Fall Risk (Adult)  Goal: Identify Related Risk Factors and Signs and Symptoms  Outcome: Ongoing (interventions implemented as appropriate)    Goal: Absence of Fall  Outcome: Ongoing (interventions implemented as appropriate)      Problem: Nausea/Vomiting (Adult)  Goal: Identify Related Risk Factors and Signs and Symptoms  Outcome: Ongoing (interventions implemented as appropriate)    Goal: Symptom Relief  Outcome: Ongoing (interventions implemented as appropriate)    Goal: Adequate Hydration  Outcome: Ongoing (interventions implemented as appropriate)      Problem: Anemia (Adult)  Goal: Identify Related Risk Factors and Signs and Symptoms  Outcome: Ongoing (interventions implemented as appropriate)    Goal: Symptom Improvement  Outcome: Ongoing (interventions implemented as appropriate)

## 2018-11-21 NOTE — THERAPY EVALUATION
Acute Care - Physical Therapy Initial Evaluation  [facility]     Patient Name: Joya Singh  : 1940  MRN: [id number]  Today's Date: 2018   Onset of Illness/Injury or Date of Surgery: 18  Date of Referral to PT: 18  Referring Physician: Dalton      Admit Date: 2018    Visit Dx:     ICD-10-CM ICD-9-CM   1. 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[facility])   • Dysuria   • Hyperlipidemia   • Hypertension   • Insomnia with sleep apnea   • Spasm   • Obstructive sleep apnea syndrome   • Peripheral neuropathy   • Speech disturbance   • Warfarin anticoagulation   • Diabetic gastroparesis (CMS/[facility])   • Primary localized osteoarthrosis of left shoulder region   • Rotator cuff tendonitis   • Acquired hypothyroidism   • Anxiety   • Gastroesophageal reflux disease   • History of biliary T-tube placement   • CKD stage 3 due to type 2 diabetes mellitus (CMS/[facility])   • Complex tear of medial meniscus of left knee as current injury   • Insufficiency fracture of tibia   • Primary osteoarthritis of left knee   • Hypoxia   • Low blood pressure   • Hyperkalemia   • Tendinitis of right rotator cuff   • AC joint arthropathy   • Subacromial impingement of right shoulder   • Right carpal tunnel syndrome   • Community acquired pneumonia of left lung   • Gastrointestinal hemorrhage   • Acute blood loss anemia     Past Medical History:   Diagnosis Date   • Allergic rhinitis    • Anxiety    • Arthritis    • Bell's palsy    • CKD (chronic kidney disease) stage 3, GFR 30-59 ml/min (CMS/[facility])    • Depression    • Diabetes mellitus (CMS/[facility])     LAST A1C 6   • Diabetic gastroparesis (CMS/[facility]) 2016   • GERD (gastroesophageal reflux disease)    •  GI bleed    • H/O blood clots     LEFT LEG 7 OR 8 YEARS AGO   • History of transfusion    • Hyperlipidemia    • Hypertension    • Hypothyroidism    • Normal coronary arteries     by cath 2013   • AARON (obstructive sleep apnea)     DOESNT WEAR REGULARLY   • PONV (postoperative nausea and vomiting)    • Skin cancer    • Stroke (CMS/HCC)    • TIA (transient ischemic attack)     LAST TIA JULY 2017     Past Surgical History:   Procedure Laterality Date   • BACK SURGERY      HARDWARE   • CHOLECYSTECTOMY      OPEN   • COLONOSCOPY  2011    due for repeat in 2021   • HYSTERECTOMY      PARTIAL    • NECK SURGERY     • SPINE SURGERY     • UPPER GASTROINTESTINAL ENDOSCOPY  2014    gastritis.  done by dr. hastings        PT ASSESSMENT (last 12 hours)      Physical Therapy Evaluation     Row Name 11/21/18 1454          PT Evaluation Time/Intention    Subjective Information  complains of;weakness;fatigue;pain;dizziness  -PC     Document Type  evaluation  -PC     Mode of Treatment  physical therapy  -PC     Patient Effort  good  -PC     Symptoms Noted During/After Treatment  dizziness;increased pain  -PC     Comment  pt with dizziness with standing, also inc pain L 2nd toe with weight bearing  -PC     Row Name 11/21/18 6355          General Information    Patient Profile Reviewed?  yes  -PC     Onset of Illness/Injury or Date of Surgery  11/19/18  -PC     Referring Physician  Dalton  -PC     Patient Observations  alert;cooperative  -PC     Patient/Family Observations  pt is in bed, no acute distress, L second toe is red and swollen  -PC     Prior Level of Function  independent:;all household mobility  -PC     Equipment Currently Used at Home  cane, straight;wheelchair;walker, rolling  -PC     Pertinent History of Current Functional Problem  pt adm with ABLA, needed blood transfusion, now Hgb 7.9, CKD, DM with neuropathy, pt now with sudden onset L foot pain, x-rays negative for fracture  -PC     Existing Precautions/Restrictions  fall   -PC     Row Name 11/21/18 1454          Relationship/Environment    Expected Impact of Illness/Hospitalization  pt reports that she is alone during the day  -PC     Row Name 11/21/18 1454          Cognitive Assessment/Intervention- PT/OT    Orientation Status (Cognition)  oriented x 4  -PC     Follows Commands (Cognition)  WNL  -PC     Row Name 11/21/18 1454          Bed Mobility Assessment/Treatment    Bed Mobility Assessment/Treatment  supine-sit;sit-supine  -PC     Supine-Sit [place] (Bed Mobility)  supervision  -     Sit-Supine [place] (Bed Mobility)  supervision  -     Row Name 11/21/18 1454          Transfer Assessment/Treatment    Transfer Assessment/Treatment  sit-stand transfer;stand-sit transfer  -PC     Sit-Stand [place] (Transfers)  contact guard  -     Stand-Sit [place] (Transfers)  contact guard  -PC     Row Name 11/21/18 1454          Sit-Stand Transfer    Assistive Device (Sit-Stand Transfers)  walker, front-wheeled  -PC     Row Name 11/21/18 1454          Stand-Sit Transfer    Assistive Device (Stand-Sit Transfers)  walker, front-wheeled  -     Row Name 11/21/18 1454          Gait/Stairs Assessment/Training    Distance in Feet (Gait)  pt was unable to walk mainly due to dizziness and feeling like she was going to pass out, she was not able to stand long enough to get a BP reading, 2 attempts made with seated rest, pt finally requ to return to supine due to dizziness, pt also with pain with WB LLE  -     Row Name 11/21/18 1454          General ROM    GENERAL ROM COMMENTS  WFL  -PC     Row Name 11/21/18 1454          MMT (Manual Muscle Testing)    General MMT Comments  no focal deficits  -     Row Name 11/21/18 1454          Motor Assessment/Intervention    Additional Documentation  Balance (Group)  -     Row Name 11/21/18 1454          Balance    Balance  static sitting balance;static standing balance  -     Row Name 11/21/18 1454          Static Sitting Balance     Level of [place] (Unsupported Sitting, Static Balance)  supervision  -PC     Sitting Position (Unsupported Sitting, Static Balance)  sitting on edge of bed  -PC     Row Name 11/21/18 1450          Static Standing Balance    Level of [place] (Supported Standing, Static Balance)  contact guard assist  -PC     Assistive Device Utilized (Supported Standing, Static Balance)  rolling walker  -PC     Row Name 11/21/18 1458          Plan of Care Review    Plan of Care Reviewed With  patient  -PC     Row Name 11/21/18 1454          Physical Therapy Clinical Impression    Date of Referral to PT  11/21/18  -PC     Criteria for Skilled Interventions Met (PT Clinical Impression)  yes;treatment indicated  -PC     Impairments Found (describe specific impairments)  gait, locomotion, and balance;aerobic capacity/endurance  -PC     Rehab Potential (PT Clinical Summary)  good, to achieve stated therapy goals  -PC     Row Name 11/21/18 1455          Physical Therapy Goals    Bed Mobility Goal Selection (PT)  --  -PC     Transfer Goal Selection (PT)  transfer, PT goal 1  -PC     Gait Training Goal Selection (PT)  gait training, PT goal 1  -PC     Row Name 11/21/18 1455          Transfer Goal 1 (PT)    Activity/Assistive Device (Transfer Goal 1, PT)  sit-to-stand/stand-to-sit  -PC     [place] Level/Cues Needed (Transfer Goal 1, PT)  supervision required  -PC     Time Frame (Transfer Goal 1, PT)  1 week  -PC     Row Name 11/21/18 1456          Gait Training Goal 1 (PT)    Activity/Assistive Device (Gait Training Goal 1, PT)  gait (walking locomotion);assistive device use  -PC     [place] Level (Gait Training Goal 1, PT)  supervision required  -PC     Distance (Gait Goal 1, PT)  100 ft  -PC     Time Frame (Gait Training Goal 1, PT)  1 week  -PC     Row Name 11/21/18 1453          Positioning and Restraints    Pre-Treatment Position  in bed  -PC     Post Treatment Position  bed  -PC     In Bed  supine;call light within  reach;encouraged to call for assist  -PC       User Key  (r) = Recorded By, (t) = Taken By, (c) = Cosigned By    Initials Name Provider Type    PC Brook Rees PT Physical Therapist        Physical Therapy Education     Title: PT OT SLP Therapies (Active)     Topic: Physical Therapy (Active)     Point: Mobility training (Active)     Learning Progress Summary           Patient Acceptance, E,D, NR by PC at 11/21/2018  3:07 PM                   Point: Home exercise program (Active)     Learning Progress Summary           Patient Acceptance, E,D, NR by PC at 11/21/2018  3:07 PM                   Point: Body mechanics (Active)     Learning Progress Summary           Patient Acceptance, E,D, NR by PC at 11/21/2018  3:07 PM                   Point: Precautions (Active)     Learning Progress Summary           Patient Acceptance, E,D, NR by PC at 11/21/2018  3:07 PM                               User Key     Initials Effective Dates Name Provider Type Discipline     04/03/18 -  Brook Rees, PT Physical Therapist PT              PT Recommendation and Plan  Anticipated Discharge Disposition (PT): home, home with assist  Planned Therapy Interventions (PT Eval): gait training, home exercise program, transfer training, strengthening  Therapy Frequency (PT Clinical Impression): daily  Outcome Summary/Treatment Plan (PT)  Anticipated Discharge Disposition (PT): home, home with assist  Plan of Care Reviewed With: patient  Outcome Summary: pt presents with weakness and impaired functional mobility and activity tolerance and will benefit from PT to address.  Today pt was unable to tolerate much due to dizziness and pain LLE with weight bearing, pt lives with son but is alone during the day, currently not safe to return home alone  Outcome Measures     Row Name 11/21/18 1500             How much help from another person do you currently need...    Turning from your back to your side while in flat bed without using bedrails?   4  -PC      Moving from lying on back to sitting on the side of a flat bed without bedrails?  4  -PC      Moving to and from a bed to a chair (including a wheelchair)?  3  -PC      Standing up from a chair using your arms (e.g., wheelchair, bedside chair)?  3  -PC      Climbing 3-5 steps with a railing?  2  -PC      To walk in hospital room?  2  -PC      AM-PAC 6 Clicks Score  18  -PC         Functional Assessment    Outcome Measure Options  AM-PAC 6 Clicks Basic Mobility (PT)  -PC        User Key  (r) = Recorded By, (t) = Taken By, (c) = Cosigned By    Initials Name Provider Type    PC Brook Rees, PT Physical Therapist         Time Calculation:   PT Charges     Row Name 11/21/18 1509             Time Calculation    Start Time  1435  -PC      Stop Time  1453  -PC      Time Calculation (min)  18 min  -PC      PT Received On  11/21/18  -PC      PT - Next Appointment  11/22/18  -PC      PT Goal Re-Cert Due Date  11/28/18  -PC        User Key  (r) = Recorded By, (t) = Taken By, (c) = Cosigned By    Initials Name Provider Type    PC Brook Rees, PT Physical Therapist        Therapy Suggested Charges     Code   Minutes Charges    None           Therapy Charges for Today     Code Description Service Date Service Provider Modifiers Qty    80364329110 HC PT MOBILITY CURRENT 11/21/2018 Brook Rees, PT GP, CK 1    92822271396 HC PT MOBILITY PROJECTED 11/21/2018 Brook Rees, PT GP, CI 1    00885485827 HC PT EVAL MOD COMPLEXITY 2 11/21/2018 Brook Rees, PT GP 1    75517668718 HC PT THER PROC EA 15 MIN 11/21/2018 Brook Rees, PT GP 1          PT G-Codes  PT Professional Judgement Used?: Yes  Outcome Measure Options: AM-PAC 6 Clicks Basic Mobility (PT)  AM-PAC 6 Clicks Score: 18  Functional Limitation: Mobility: Walking and moving around  Mobility: Walking and Moving Around Current Status (): At least 40 percent but less than 60 percent impaired, limited or restricted  Mobility: Walking and Moving  Around Goal Status (): At least 1 percent but less than 20 percent impaired, limited or restricted      Brook Rees, PT  11/21/2018

## 2018-11-21 NOTE — PROGRESS NOTES
"    DAILY PROGRESS NOTE  [facility]    Patient Identification:  Name: Joya Singh  Age: 78 y.o.  Sex: female  :  1940  MRN: [id number]         Primary Care Physician: Chari Mercado MD    Subjective:  Interval History: feels weak w/ left foot/ankle pain - denies trauma and no phx gout - denies cp/sob/bleeding     Objective:fm x2 at bs     Scheduled Meds:  epoetin chu 20,000 Units Subcutaneous Weekly   insulin lispro 0-7 Units Subcutaneous 4x Daily With Meals & Nightly   sodium chloride 3 mL Intravenous Q12H     Continuous Infusions:     Vital signs in last 24 hours:  Temp:  [97.7 °F (36.5 °C)-98 °F (36.7 °C)] 98 °F (36.7 °C)  Heart Rate:  [75-92] 87  Resp:  [16] 16  BP: (127-156)/(52-75) 136/62    Intake/Output:    Intake/Output Summary (Last 24 hours) at 2018 1306  Last data filed at 2018 0837  Gross per 24 hour   Intake 480 ml   Output 325 ml   Net 155 ml       Exam:  /62 (BP Location: Right arm, Patient Position: Lying)   Pulse 87   Temp 98 °F (36.7 °C) (Oral)   Resp 16   Ht 157.5 cm (62.01\")   Wt 92 kg (202 lb 14.4 oz)   SpO2 95%   BMI 37.10 kg/m²     General Appearance:    Alert, cooperative, no distress, AAOx3, eating lunch in bed                          Throat:   Lips, tongue, gums normal; oral mucosa pink and moist                           Neck:   Supple, symmetrical, trachea midline, no JVD                         Lungs:    Clear to auscultation bilaterally, respirations unlabored                          Heart:    Regular rate and rhythm, S1 and S2 normal                  Abdomen:     Soft, non-tender, bowel sounds active                 Extremities:   Left ankle ttp but no other abnormalities and no concerning ttp over bony landmarks                         Pulses:   Pulses palpable in lower extremities                                 Data Review:  Labs in chart were reviewed.    Assessment:  Active Hospital Problems    Diagnosis Date Noted   • " **Acute blood loss anemia [D62] 11/19/2018   • CKD stage 3 due to type 2 diabetes mellitus (CMS/[facility]) [E11.22, N18.3] 02/28/2017   • Chronic anemia [D64.9] 02/02/2016   • Hyperlipidemia [E78.5] 02/02/2016   • Hypertension [I10] 02/02/2016   • Obstructive sleep apnea syndrome [G47.33] 02/02/2016   • Chronic back pain [M54.9, G89.29] 02/02/2016   • Type 2 diabetes mellitus with neurologic complication (CMS/[facility]) [E11.49] 02/02/2016      Resolved Hospital Problems   No resolved problems to display.       Plan:  Anemia - d/w  Ken and appreciate input - plans for outpatient capsule    -Hgb holding at 7.9x2   -also appreciate Renal input w/ Epogen     -doubtful myelodysplasia and holding on BMBx    -previous labs less skewed upon repeat     CKD3 - stable      DM2 - A1c 7.43     HTN/HLD     Left ankle/foot pain - uric mildly elevated but doubt gout - xray pending - PT eval if xray is negative    Notified by CCP that not qualifying for inpatient - no plans for DC today       Addendum - notified by RN that med rec was not reconciled by admitting MD so completed - add Lantus at 35u (home Tresiba was 50u) - continue ssi but hold scheduled Novolog. Hold midodrine     Notified by CCP that patient does not qualify for inpatient status so forced to change to OBS    Tigre Hoffman MD  11/21/2018  1:06 PM

## 2018-11-21 NOTE — PLAN OF CARE
Problem: Patient Care Overview  Goal: Plan of Care Review  Outcome: Ongoing (interventions implemented as appropriate)   11/21/18 9955   Coping/Psychosocial   Plan of Care Reviewed With patient   OTHER   Outcome Summary pt presents with weakness and impaired functional mobility and activity tolerance and will benefit from PT to address. Today pt was unable to tolerate much due to dizziness and pain LLE with weight bearing, pt lives with son but is alone during the day, currently not safe to return home alone

## 2018-11-21 NOTE — PROGRESS NOTES
GI Daily Progress Note    Assessment/Plan:      Acute blood loss anemia    Chronic back pain    Chronic anemia    Type 2 diabetes mellitus with neurologic complication (CMS/HCC)    Hyperlipidemia    Hypertension    Obstructive sleep apnea syndrome    CKD stage 3 due to type 2 diabetes mellitus (CMS/[facility])       LOS: 2 days     Joya Singh is a 78 y.o. female who was admitted with Acute blood loss anemia. She reports the symptoms are improving with treatment. Repeat iron studies do not support MANDEEP, no clinical bleeding and renal's note appreciated.  This AM she has L foot pain and gives a history of GOut so will check a Uric Acid and a foot xray. Would favor steroids or Colcrys over NSAIDS for treatment but will leave for hospitalist.      Subjective:    Patient expresses Foot pain  Patient denies abdominal pain, vomiting, diarrhea, bloody stools, difficulty swallowing and loss of appetite    Objective:    Vital signs in last 24 hours:  Temp:  [97.6 °F (36.4 °C)-98 °F (36.7 °C)] 97.9 °F (36.6 °C)  Heart Rate:  [74-92] 92  Resp:  [16] 16  BP: (127-156)/(52-75) 156/75    Intake/Output last 3 shifts:  I/O last 3 completed shifts:  In: 120 [P.O.:120]  Out: 350 [Urine:350]  Intake/Output this shift:  I/O this shift:  In: 240 [P.O.:240]  Out: 325 [Urine:325]    Physical Exam:Abdomen  Sounds Normal Active Bowel Sounds   Distension Soft   Tenderness Nontender   Left foot with mild erythema of second digit and tenderness midfoot  Imaging Results (last 72 hours)     ** No results found for the last 72 hours. **          WBC   Date Value Ref Range Status   11/19/2018 6.02 4.50 - 10.70 10*3/mm3 Final     RBC   Date Value Ref Range Status   11/19/2018 2.49 (L) 3.90 - 5.20 10*6/mm3 Final     Hemoglobin   Date Value Ref Range Status   11/20/2018 7.6 (L) 11.9 - 15.5 g/dL Final     Hematocrit   Date Value Ref Range Status   11/20/2018 23.6 (L) 35.6 - 45.5 % Final     MCV   Date Value Ref Range Status   11/19/2018 98.4 (H) 80.5 - 98.2 fL  Final     MCH   Date Value Ref Range Status   11/19/2018 31.7 26.9 - 32.0 pg Final     MCHC   Date Value Ref Range Status   11/19/2018 32.2 (L) 32.4 - 36.3 g/dL Final     RDW   Date Value Ref Range Status   11/19/2018 18.3 (H) 11.7 - 13.0 % Final     RDW-SD   Date Value Ref Range Status   11/19/2018 63.2 (H) 37.0 - 54.0 fl Final     MPV   Date Value Ref Range Status   11/19/2018 10.8 6.0 - 12.0 fL Final     Platelets   Date Value Ref Range Status   11/19/2018 411 140 - 500 10*3/mm3 Final     Neutrophil %   Date Value Ref Range Status   11/19/2018 46.5 42.7 - 76.0 % Final     Lymphocyte %   Date Value Ref Range Status   11/19/2018 42.2 19.6 - 45.3 % Final     Monocyte %   Date Value Ref Range Status   11/19/2018 3.7 (L) 5.0 - 12.0 % Final     Eosinophil %   Date Value Ref Range Status   11/19/2018 6.3 (H) 0.3 - 6.2 % Final     Basophil %   Date Value Ref Range Status   11/19/2018 1.3 0.0 - 1.5 % Final     Immature Grans %   Date Value Ref Range Status   11/19/2018 0.2 0.0 - 0.5 % Final     Neutrophils, Absolute   Date Value Ref Range Status   11/19/2018 2.80 1.90 - 8.10 10*3/mm3 Final     Lymphocytes, Absolute   Date Value Ref Range Status   11/19/2018 2.54 0.90 - 4.80 10*3/mm3 Final     Monocytes, Absolute   Date Value Ref Range Status   11/19/2018 0.22 0.20 - 1.20 10*3/mm3 Final     Eosinophils, Absolute   Date Value Ref Range Status   11/19/2018 0.38 0.00 - 0.70 10*3/mm3 Final     Basophils, Absolute   Date Value Ref Range Status   11/19/2018 0.08 0.00 - 0.20 10*3/mm3 Final     Immature Grans, Absolute   Date Value Ref Range Status   11/19/2018 0.01 0.00 - 0.03 10*3/mm3 Final     nRBC   Date Value Ref Range Status   11/19/2018 0.0 0.0 - 0.0 /100 WBC Final       Glucose   Date Value Ref Range Status   11/20/2018 144 (H) 65 - 99 mg/dL Final     Sodium   Date Value Ref Range Status   11/20/2018 144 136 - 145 mmol/L Final     Potassium   Date Value Ref Range Status   11/20/2018 4.5 3.5 - 5.2 mmol/L Final     CO2    Date Value Ref Range Status   11/20/2018 28.3 22.0 - 29.0 mmol/L Final     Chloride   Date Value Ref Range Status   11/20/2018 104 98 - 107 mmol/L Final     Anion Gap   Date Value Ref Range Status   11/20/2018 11.7 mmol/L Final     Creatinine   Date Value Ref Range Status   11/20/2018 1.74 (H) 0.57 - 1.00 mg/dL Final     BUN   Date Value Ref Range Status   11/20/2018 21 8 - 23 mg/dL Final     BUN/Creatinine Ratio   Date Value Ref Range Status   11/20/2018 12.1 7.0 - 25.0 Final     Calcium   Date Value Ref Range Status   11/20/2018 8.8 8.6 - 10.5 mg/dL Final     eGFR Non  Amer   Date Value Ref Range Status   11/20/2018 28 (L) >60 mL/min/1.73 Final     Alkaline Phosphatase   Date Value Ref Range Status   11/19/2018 99 40 - 129 U/L Final     Total Protein   Date Value Ref Range Status   11/19/2018 7.7 6.0 - 8.5 g/dL Final     ALT (SGPT)   Date Value Ref Range Status   11/19/2018 8 5 - 33 U/L Final     AST (SGOT)   Date Value Ref Range Status   11/19/2018 9 5 - 32 U/L Final     Total Bilirubin   Date Value Ref Range Status   11/19/2018 0.4 0.2 - 1.2 mg/dL Final     Albumin   Date Value Ref Range Status   11/19/2018 3.50 3.50 - 5.20 g/dL Final     Globulin   Date Value Ref Range Status   11/19/2018 4.2 gm/dL Final       Anemia  CKD IV  DM  L Foot Pain ? Gout?    As above will check foot xray and Uric acid and will see as OP to follow HGB and see if Capsule endoscopy is indicated.

## 2018-11-22 LAB
CRP SERPL-MCNC: 1.8 MG/DL (ref 0–0.5)
ERYTHROCYTE [SEDIMENTATION RATE] IN BLOOD: >140 MM/HR (ref 0–30)
GLUCOSE BLDC GLUCOMTR-MCNC: 190 MG/DL (ref 70–130)
GLUCOSE BLDC GLUCOMTR-MCNC: 246 MG/DL (ref 70–130)
GLUCOSE BLDC GLUCOMTR-MCNC: 277 MG/DL (ref 70–130)
GLUCOSE BLDC GLUCOMTR-MCNC: 328 MG/DL (ref 70–130)
HCT VFR BLD AUTO: 24.1 % (ref 35.6–45.5)
HGB BLD-MCNC: 7.6 G/DL (ref 11.9–15.5)

## 2018-11-22 PROCEDURE — 97110 THERAPEUTIC EXERCISES: CPT

## 2018-11-22 PROCEDURE — 25010000002 ONDANSETRON PER 1 MG: Performed by: NURSE PRACTITIONER

## 2018-11-22 PROCEDURE — 85014 HEMATOCRIT: CPT | Performed by: HOSPITALIST

## 2018-11-22 PROCEDURE — 63710000001 INSULIN LISPRO (HUMAN) PER 5 UNITS: Performed by: NURSE PRACTITIONER

## 2018-11-22 PROCEDURE — 63710000001 INSULIN GLARGINE PER 5 UNITS: Performed by: HOSPITALIST

## 2018-11-22 PROCEDURE — 99213 OFFICE O/P EST LOW 20 MIN: CPT | Performed by: INTERNAL MEDICINE

## 2018-11-22 PROCEDURE — 82962 GLUCOSE BLOOD TEST: CPT

## 2018-11-22 PROCEDURE — G0378 HOSPITAL OBSERVATION PER HR: HCPCS

## 2018-11-22 PROCEDURE — 96376 TX/PRO/DX INJ SAME DRUG ADON: CPT

## 2018-11-22 PROCEDURE — 85018 HEMOGLOBIN: CPT | Performed by: HOSPITALIST

## 2018-11-22 PROCEDURE — 85652 RBC SED RATE AUTOMATED: CPT | Performed by: ORTHOPAEDIC SURGERY

## 2018-11-22 RX ORDER — COLCHICINE 0.6 MG/1
1.2 TABLET ORAL ONCE
Status: COMPLETED | OUTPATIENT
Start: 2018-11-22 | End: 2018-11-22

## 2018-11-22 RX ORDER — COLCHICINE 0.6 MG/1
0.6 TABLET ORAL EVERY 12 HOURS SCHEDULED
Status: DISCONTINUED | OUTPATIENT
Start: 2018-11-22 | End: 2018-11-23

## 2018-11-22 RX ADMIN — ACETAMINOPHEN 650 MG: 325 TABLET, FILM COATED ORAL at 17:45

## 2018-11-22 RX ADMIN — POTASSIUM CHLORIDE 10 MEQ: 750 CAPSULE, EXTENDED RELEASE ORAL at 06:11

## 2018-11-22 RX ADMIN — ACETAMINOPHEN 650 MG: 325 TABLET, FILM COATED ORAL at 22:33

## 2018-11-22 RX ADMIN — FUROSEMIDE 20 MG: 20 TABLET ORAL at 08:56

## 2018-11-22 RX ADMIN — LEVOTHYROXINE SODIUM 88 MCG: 88 TABLET ORAL at 06:11

## 2018-11-22 RX ADMIN — SODIUM CHLORIDE, PRESERVATIVE FREE 3 ML: 5 INJECTION INTRAVENOUS at 08:57

## 2018-11-22 RX ADMIN — GABAPENTIN 400 MG: 400 CAPSULE ORAL at 20:14

## 2018-11-22 RX ADMIN — INSULIN GLARGINE 35 UNITS: 100 INJECTION, SOLUTION SUBCUTANEOUS at 21:03

## 2018-11-22 RX ADMIN — GABAPENTIN 400 MG: 400 CAPSULE ORAL at 08:56

## 2018-11-22 RX ADMIN — INSULIN LISPRO 4 UNITS: 100 INJECTION, SOLUTION INTRAVENOUS; SUBCUTANEOUS at 21:03

## 2018-11-22 RX ADMIN — DULOXETINE HYDROCHLORIDE 30 MG: 30 CAPSULE, DELAYED RELEASE ORAL at 08:56

## 2018-11-22 RX ADMIN — SODIUM CHLORIDE, PRESERVATIVE FREE 3 ML: 5 INJECTION INTRAVENOUS at 21:05

## 2018-11-22 RX ADMIN — ONDANSETRON 4 MG: 2 INJECTION INTRAMUSCULAR; INTRAVENOUS at 00:13

## 2018-11-22 RX ADMIN — INSULIN LISPRO 5 UNITS: 100 INJECTION, SOLUTION INTRAVENOUS; SUBCUTANEOUS at 11:37

## 2018-11-22 RX ADMIN — CYCLOBENZAPRINE 5 MG: 10 TABLET, FILM COATED ORAL at 06:11

## 2018-11-22 RX ADMIN — ATORVASTATIN CALCIUM 10 MG: 10 TABLET, FILM COATED ORAL at 20:14

## 2018-11-22 RX ADMIN — PANTOPRAZOLE SODIUM 40 MG: 40 TABLET, DELAYED RELEASE ORAL at 06:11

## 2018-11-22 RX ADMIN — GABAPENTIN 400 MG: 400 CAPSULE ORAL at 11:36

## 2018-11-22 RX ADMIN — ACETAMINOPHEN 650 MG: 325 TABLET, FILM COATED ORAL at 13:10

## 2018-11-22 RX ADMIN — INSULIN LISPRO 2 UNITS: 100 INJECTION, SOLUTION INTRAVENOUS; SUBCUTANEOUS at 08:56

## 2018-11-22 RX ADMIN — INSULIN LISPRO 3 UNITS: 100 INJECTION, SOLUTION INTRAVENOUS; SUBCUTANEOUS at 17:07

## 2018-11-22 RX ADMIN — COLCHICINE 0.6 MG: 0.6 TABLET, FILM COATED ORAL at 20:14

## 2018-11-22 RX ADMIN — DULOXETINE HYDROCHLORIDE 30 MG: 30 CAPSULE, DELAYED RELEASE ORAL at 20:13

## 2018-11-22 RX ADMIN — COLCHICINE 1.2 MG: 0.6 TABLET, FILM COATED ORAL at 09:51

## 2018-11-22 NOTE — PROGRESS NOTES
GI Daily Progress Note    Assessment/Plan:      Acute blood loss anemia    Chronic back pain    Chronic anemia    Type 2 diabetes mellitus with neurologic complication (CMS/[facility])    Hyperlipidemia    Hypertension    Obstructive sleep apnea syndrome    CKD stage 3 due to type 2 diabetes mellitus (CMS/[facility])       LOS: 0 days     Joya Singh is a 78 y.o. female who was admitted with Acute blood loss anemia. She reports the symptoms are improving with treatment. HGB stable and still with foot pain and negtive xray but elevated UA. States her Son denies her having gout in the past, difficult decision on treatment but would favor Colcrys to avoid Steroids due to BS and NSAIDS for possible bleeding but Pt is significantly symptomatic    Subjective:    Patient expresses LLe pain  Patient denies abdominal pain and bloody stools    Objective:    Vital signs in last 24 hours:  Temp:  [97.7 °F (36.5 °C)-98.3 °F (36.8 °C)] 98.3 °F (36.8 °C)  Heart Rate:  [86] 86  Resp:  [16-20] 20  BP: (144-157)/(58-64) 144/64    Intake/Output last 3 shifts:  I/O last 3 completed shifts:  In: 810 [P.O.:810]  Out: 325 [Urine:325]  Intake/Output this shift:  No intake/output data recorded.    Physical Exam:Abdomen  Sounds Normal Active Bowel Sounds   Distension Soft   Tenderness Nontender     Imaging Results (last 72 hours)     Procedure Component Value Units Date/Time    XR Foot 3+ View Left [[id number]] Collected:  11/21/18 1318     Updated:  11/21/18 1323    Narrative:       XR FOOT 3+ VW LEFT-     INDICATIONS:    Left foot pain     TECHNIQUE: 3 VIEWS OF THE LEFT FOOT     COMPARISON: None available     FINDINGS:      No acute fracture or erosion is identified. Moderate calcaneal  spurring. Sclerotic focus at the mid calcaneus, although nonspecific,  may represent bone island. No dislocation.  Small arterial calcification  is suggested.       Impression:          No acute fracture or erosion is identified. If there is clinical  suspicion for  radiographically occult osteomyelitis or insufficiency  fracture, MRI or bone scan could be considered for further evaluation.     This report was finalized on 11/21/2018 1:20 PM by Dr. Aleks Waters M.D.             WBC   Date Value Ref Range Status   11/21/2018 6.49 4.50 - 10.70 10*3/mm3 Final     RBC   Date Value Ref Range Status   11/21/2018 2.49 (L) 3.90 - 5.20 10*6/mm3 Final     Hemoglobin   Date Value Ref Range Status   11/22/2018 7.6 (L) 11.9 - 15.5 g/dL Final     Hematocrit   Date Value Ref Range Status   11/22/2018 24.1 (L) 35.6 - 45.5 % Final     MCV   Date Value Ref Range Status   11/21/2018 98.8 (H) 80.5 - 98.2 fL Final     MCH   Date Value Ref Range Status   11/21/2018 31.7 26.9 - 32.0 pg Final     MCHC   Date Value Ref Range Status   11/21/2018 32.1 (L) 32.4 - 36.3 g/dL Final     RDW   Date Value Ref Range Status   11/21/2018 18.4 (H) 11.7 - 13.0 % Final     RDW-SD   Date Value Ref Range Status   11/21/2018 63.7 (H) 37.0 - 54.0 fl Final     MPV   Date Value Ref Range Status   11/21/2018 10.8 6.0 - 12.0 fL Final     Platelets   Date Value Ref Range Status   11/21/2018 412 140 - 500 10*3/mm3 Final     Neutrophil %   Date Value Ref Range Status   11/19/2018 46.5 42.7 - 76.0 % Final     Lymphocyte %   Date Value Ref Range Status   11/19/2018 42.2 19.6 - 45.3 % Final     Monocyte %   Date Value Ref Range Status   11/19/2018 3.7 (L) 5.0 - 12.0 % Final     Eosinophil %   Date Value Ref Range Status   11/19/2018 6.3 (H) 0.3 - 6.2 % Final     Basophil %   Date Value Ref Range Status   11/19/2018 1.3 0.0 - 1.5 % Final     Immature Grans %   Date Value Ref Range Status   11/19/2018 0.2 0.0 - 0.5 % Final     Neutrophils, Absolute   Date Value Ref Range Status   11/19/2018 2.80 1.90 - 8.10 10*3/mm3 Final     Neutrophils Absolute   Date Value Ref Range Status   11/21/2018 4.02 1.90 - 8.10 10*3/mm3 Corrected     Comment:     Corrected result. Previous result was 3.37 10*3/mm3 on 11/21/2018 at 0653 EST.      Lymphocytes, Absolute   Date Value Ref Range Status   11/19/2018 2.54 0.90 - 4.80 10*3/mm3 Final     Monocytes, Absolute   Date Value Ref Range Status   11/19/2018 0.22 0.20 - 1.20 10*3/mm3 Final     Eosinophils, Absolute   Date Value Ref Range Status   11/19/2018 0.38 0.00 - 0.70 10*3/mm3 Final     Eosinophils Absolute   Date Value Ref Range Status   11/21/2018 0.58 0.00 - 0.70 10*3/mm3 Corrected     Comment:     Corrected result. Previous result was 0.13 10*3/mm3 on 11/21/2018 at 0653 EST.     Basophils, Absolute   Date Value Ref Range Status   11/19/2018 0.08 0.00 - 0.20 10*3/mm3 Final     Immature Grans, Absolute   Date Value Ref Range Status   11/19/2018 0.01 0.00 - 0.03 10*3/mm3 Final     nRBC   Date Value Ref Range Status   11/21/2018 1.0 (H) 0.0 - 0.0 /100 WBC Final   11/19/2018 0.0 0.0 - 0.0 /100 WBC Final       Glucose   Date Value Ref Range Status   11/20/2018 144 (H) 65 - 99 mg/dL Final     Sodium   Date Value Ref Range Status   11/20/2018 144 136 - 145 mmol/L Final     Potassium   Date Value Ref Range Status   11/20/2018 4.5 3.5 - 5.2 mmol/L Final     CO2   Date Value Ref Range Status   11/20/2018 28.3 22.0 - 29.0 mmol/L Final     Chloride   Date Value Ref Range Status   11/20/2018 104 98 - 107 mmol/L Final     Anion Gap   Date Value Ref Range Status   11/20/2018 11.7 mmol/L Final     Creatinine   Date Value Ref Range Status   11/20/2018 1.74 (H) 0.57 - 1.00 mg/dL Final     BUN   Date Value Ref Range Status   11/20/2018 21 8 - 23 mg/dL Final     BUN/Creatinine Ratio   Date Value Ref Range Status   11/20/2018 12.1 7.0 - 25.0 Final     Calcium   Date Value Ref Range Status   11/20/2018 8.8 8.6 - 10.5 mg/dL Final     eGFR Non  Amer   Date Value Ref Range Status   11/20/2018 28 (L) >60 mL/min/1.73 Final     Alkaline Phosphatase   Date Value Ref Range Status   11/19/2018 99 40 - 129 U/L Final     Total Protein   Date Value Ref Range Status   11/19/2018 7.7 6.0 - 8.5 g/dL Final     ALT (SGPT)    Date Value Ref Range Status   11/19/2018 8 5 - 33 U/L Final     AST (SGOT)   Date Value Ref Range Status   11/19/2018 9 5 - 32 U/L Final     Total Bilirubin   Date Value Ref Range Status   11/19/2018 0.4 0.2 - 1.2 mg/dL Final     Albumin   Date Value Ref Range Status   11/19/2018 3.50 3.50 - 5.20 g/dL Final     Globulin   Date Value Ref Range Status   11/19/2018 4.2 gm/dL Final     Anemia  CKD IV  DM  L Foot Pain ? Gout? Elevated Uric Acid    As above would favor Colcrys vs Steroids vs NSAIDS  Has follow up already scheduled in the office for December will arrange Capsule at that time.

## 2018-11-22 NOTE — PLAN OF CARE
Problem: Patient Care Overview  Goal: Plan of Care Review  Outcome: Ongoing (interventions implemented as appropriate)   11/22/18 0575   Coping/Psychosocial   Plan of Care Reviewed With patient   Plan of Care Review   Progress improving   OTHER   Outcome Summary VSS.C/O nausea but relieved with zofran. No other changes during nigh. Will continue to monitor.

## 2018-11-22 NOTE — THERAPY TREATMENT NOTE
Acute Care - Physical Therapy Treatment Note  [facility]     Patient Name: Joya Singh  : 1940  MRN: [id number]  Today's Date: 2018  Onset of Illness/Injury or Date of Surgery: 18  Date of Referral to PT: 18  Referring Physician: Dalton    Admit Date: 2018    Visit Dx:    ICD-10-CM ICD-9-CM   1. Impaired functional mobility and activity tolerance Z74.09 V49.89     Patient Active Problem List   Diagnosis   • Deep vein thrombosis of lower extremity (CMS/HCC)   • Arthritis   • Chronic back pain   • Chronic anemia   • Type 2 diabetes mellitus with neurologic complication (CMS/HCC)   • Brittle diabetes mellitus (CMS/HCC)   • Dysuria   • Hyperlipidemia   • Hypertension   • Insomnia with sleep apnea   • Spasm   • Obstructive sleep apnea syndrome   • Peripheral neuropathy   • Speech disturbance   • Warfarin anticoagulation   • Diabetic gastroparesis (CMS/HCC)   • Primary localized osteoarthrosis of left shoulder region   • Rotator cuff tendonitis   • Acquired hypothyroidism   • Anxiety   • Gastroesophageal reflux disease   • History of biliary T-tube placement   • 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)   • Complex tear of medial meniscus of left knee as current injury   • Insufficiency fracture of tibia   • Primary osteoarthritis of left knee   • Hypoxia   • Low blood pressure   • Hyperkalemia   • Tendinitis of right rotator cuff   • AC joint arthropathy   • Subacromial impingement of right shoulder   • Right carpal tunnel syndrome   • Community acquired pneumonia of left lung   • Gastrointestinal hemorrhage   • Acute blood loss anemia       Therapy Treatment    Rehabilitation Treatment Summary     Row Name 18 1432             Treatment Time/Intention    Discipline  physical therapy assistant  -CW      Document Type  therapy note (daily note)  -CW      Subjective Information  complains of;weakness;fatigue;pain  -CW      Mode of Treatment  physical therapy  -CW       Therapy Frequency (PT Clinical Impression)  daily  -CW      Patient Effort  good  -CW      Existing Precautions/Restrictions  fall;oxygen therapy device and L/min  -CW      Recorded by [CW] Michael Patel, PTA 11/22/18 1445      Row Name 11/22/18 1432             Vital Signs    O2 Delivery Pre Treatment  supplemental O2  -CW      O2 Delivery Intra Treatment  supplemental O2  -CW      O2 Delivery Post Treatment  supplemental O2  -CW      Recorded by [CW] Michael Patel, PTA 11/22/18 1445      Row Name 11/22/18 1432             Cognitive Assessment/Intervention- PT/OT    Orientation Status (Cognition)  oriented x 4  -CW      Follows Commands (Cognition)  WNL  -CW      Personal Safety Interventions  fall prevention program maintained;gait belt;muscle strengthening facilitated;nonskid shoes/slippers when out of bed  -CW      Recorded by [CW] Michael Patel, PTA 11/22/18 1445      Row Name 11/22/18 1432             Bed Mobility Assessment/Treatment    Bed Mobility Assessment/Treatment  supine-sit;sit-supine  -CW      Supine-Sit [place] (Bed Mobility)  contact guard  -CW      Sit-Supine [place] (Bed Mobility)  contact guard  -CW      Assistive Device (Bed Mobility)  bed rails  -CW      Recorded by [CW] Michael Patel, PTA 11/22/18 1445      Row Name 11/22/18 1432             Transfer Assessment/Treatment    Transfer Assessment/Treatment  sit-stand transfer;stand-sit transfer  -CW      Recorded by [CW] Michael Patel, PTA 11/22/18 1445      Row Name 11/22/18 1432             Sit-Stand Transfer    Sit-Stand [place] (Transfers)  contact guard  -CW      Assistive Device (Sit-Stand Transfers)  walker, front-wheeled  -CW      Recorded by [CW] Michael Patel, PTA 11/22/18 1445      Row Name 11/22/18 1432             Stand-Sit Transfer    Stand-Sit [place] (Transfers)  contact guard  -CW      Assistive Device (Stand-Sit Transfers)  walker, front-wheeled  -CW      Recorded by [CW]  Michael Patel, PTA 11/22/18 1445      Row Name 11/22/18 1432             Gait/Stairs Assessment/Training    [place] Level (Gait)  contact guard  -CW      Assistive Device (Gait)  walker, front-wheeled  -CW      Distance in Feet (Gait)  20  -CW      Pattern (Gait)  step-through  -CW      Deviations/Abnormal Patterns (Gait)  antalgic;sameera decreased;gait speed decreased;stride length decreased  -CW      Recorded by [CW] Michael Patel, PTA 11/22/18 1445      Row Name 11/22/18 1432             Positioning and Restraints    Pre-Treatment Position  in bed  -CW      Post Treatment Position  bed  -CW      In Bed  notified nsg;supine;call light within reach;encouraged to call for assist  -CW      Recorded by [CW] Michael Patel, PTA 11/22/18 1445      Row Name 11/22/18 1432             Outcome Summary/Treatment Plan (PT)    Anticipated Discharge Disposition (PT)  home;home with assist  -CW      Recorded by [CW] Michael Patel, PTA 11/22/18 1445        User Key  (r) = Recorded By, (t) = Taken By, (c) = Cosigned By    Initials Name Effective Dates Discipline    CW Michael Patel, PTA 03/07/18 -  PT                   Physical Therapy Education     Title: PT OT SLP Therapies (Active)     Topic: Physical Therapy (Done)     Point: Mobility training (Done)     Learning Progress Summary           Patient Acceptance, E,TB, VU,DU by  at 11/22/2018  2:45 PM    Acceptance, E,D, NR by PC at 11/21/2018  3:07 PM                   Point: Home exercise program (Done)     Learning Progress Summary           Patient Acceptance, E,TB, VU,DU by CW at 11/22/2018  2:45 PM    Acceptance, E,D, NR by PC at 11/21/2018  3:07 PM                   Point: Body mechanics (Done)     Learning Progress Summary           Patient Acceptance, E,TB, VU,DU by CW at 11/22/2018  2:45 PM    Acceptance, E,D, NR by PC at 11/21/2018  3:07 PM                   Point: Precautions (Done)     Learning Progress Summary           Patient  Acceptance, E,TB, VU,DU by CW at 11/22/2018  2:45 PM    Acceptance, E,D, NR by PC at 11/21/2018  3:07 PM                               User Key     Initials Effective Dates Name Provider Type Discipline    PC 04/03/18 -  Brook Rees, PT Physical Therapist PT    CW 03/07/18 -  Michael Patel, ALE Physical Therapy Assistant PT                PT Recommendation and Plan  Anticipated Discharge Disposition (PT): home, home with assist  Therapy Frequency (PT Clinical Impression): daily  Outcome Summary/Treatment Plan (PT)  Anticipated Discharge Disposition (PT): home, home with assist  Plan of Care Reviewed With: patient  Progress: improving  Outcome Summary: Pt increasing with strength and balance with use of RWX and 2L o2 to maintain safety  Outcome Measures     Row Name 11/22/18 1400 11/21/18 1500          How much help from another person do you currently need...    Turning from your back to your side while in flat bed without using bedrails?  4  -CW  4  -PC     Moving from lying on back to sitting on the side of a flat bed without bedrails?  4  -CW  4  -PC     Moving to and from a bed to a chair (including a wheelchair)?  3  -CW  3  -PC     Standing up from a chair using your arms (e.g., wheelchair, bedside chair)?  3  -CW  3  -PC     Climbing 3-5 steps with a railing?  2  -CW  2  -PC     To walk in hospital room?  3  -CW  2  -PC     AM-PAC 6 Clicks Score  19  -CW  18  -PC        Functional Assessment    Outcome Measure Options  AM-PAC 6 Clicks Basic Mobility (PT)  -CW  AM-PAC 6 Clicks Basic Mobility (PT)  -PC       User Key  (r) = Recorded By, (t) = Taken By, (c) = Cosigned By    Initials Name Provider Type    PC Brook Rees, PT Physical Therapist    CW Michael Patel, ALE Physical Therapy Assistant         Time Calculation:   PT Charges     Row Name 11/22/18 1447             Time Calculation    Start Time  1430  -CW      Stop Time  1447  -CW      Time Calculation (min)  17 min  -CW      PT Received  On  11/22/18  -CW      PT - Next Appointment  11/23/18  -CW        User Key  (r) = Recorded By, (t) = Taken By, (c) = Cosigned By    Initials Name Provider Type    Micahel Adams PTA Physical Therapy Assistant        Therapy Suggested Charges     Code   Minutes Charges    None           Therapy Charges for Today     Code Description Service Date Service Provider Modifiers Qty    39787839490 HC PT THER PROC EA 15 MIN 11/22/2018 Michael Patel PTA GP 1    59636995021 HC PT THER SUPP EA 15 MIN 11/22/2018 Michael Patel PTA GP 1          PT G-Codes  PT Professional Judgement Used?: Yes  Outcome Measure Options: AM-PAC 6 Clicks Basic Mobility (PT)  AM-PAC 6 Clicks Score: 19  Functional Limitation: Mobility: Walking and moving around  Mobility: Walking and Moving Around Current Status (): At least 40 percent but less than 60 percent impaired, limited or restricted  Mobility: Walking and Moving Around Goal Status (): At least 1 percent but less than 20 percent impaired, limited or restricted    Michael Patel PTA  11/22/2018

## 2018-11-22 NOTE — PROGRESS NOTES
"   LOS: 0 days   Patient Care Team:  Chari Mercado MD as PCP - General  Chari Mercado MD as PCP - Family Medicine  Christos Rob MD as Surgeon (General Surgery)  German Wylie MD as Surgeon (Orthopedic Surgery)    Chief Complaint/ Reason for encounter: Chronic kidney disease, anemia management        Subjective     Medical history reviewed:  History of Present Illness    Subjective:  Symptoms:  Stable.  She reports weakness.  No shortness of breath or chest pain.  (She feels about the same today and denies any new complaints).    Diet:  Adequate intake.    Activity level: Returning to normal.    Pain:  She reports no pain.          History taken from: Patient and chart    Objective     Vital Signs  Temp:  [97.7 °F (36.5 °C)-98.7 °F (37.1 °C)] 98.7 °F (37.1 °C)  Heart Rate:  [84-87] 84  Resp:  [16-20] 16  BP: (125-157)/(48-64) 125/48       Wt Readings from Last 1 Encounters:   11/19/18 2238 92 kg (202 lb 14.4 oz)       Objective:  General Appearance:  Comfortable, in no acute distress and not in pain (Obese, chronically ill-appearing).    Vital signs: (most recent): Blood pressure 125/48, pulse 84, temperature 98.7 °F (37.1 °C), temperature source Oral, resp. rate 16, height 157.5 cm (62.01\"), weight 92 kg (202 lb 14.4 oz), SpO2 93 %.  Vital signs are normal.  No fever.    Output: Producing urine.    HEENT: Normal HEENT exam.    Lungs:  Normal effort and normal respiratory rate.  Breath sounds clear to auscultation.  She is not in respiratory distress.  No decreased breath sounds.    Heart: Normal rate.  Regular rhythm.  S1 normal.  No murmur.   Abdomen: Abdomen is soft.  Bowel sounds are normal.   There is no abdominal tenderness.     Extremities: Normal range of motion.    Pulses: Distal pulses are intact.    Neurological: Patient is alert and oriented to person, place and time.    Skin:  Warm and dry.  No rash or cyanosis.             Results Review:    Intake/Output: "     Intake/Output Summary (Last 24 hours) at 11/22/2018 1059  Last data filed at 11/22/2018 0952  Gross per 24 hour   Intake 1080 ml   Output --   Net 1080 ml         DATA:  Radiology and Labs:  The following labs independently reviewed by me. Additional labs ordered for tomorrow a.m.  Interval notes, chart personally reviewed by me.   Old records independently reviewed showing stage III chronic kidney disease, baseline creatinine 1.7      Labs:   Recent Results (from the past 24 hour(s))   POC Glucose Once    Collection Time: 11/21/18 11:17 AM   Result Value Ref Range    Glucose 272 (H) 70 - 130 mg/dL   POC Glucose Once    Collection Time: 11/21/18  4:42 PM   Result Value Ref Range    Glucose 242 (H) 70 - 130 mg/dL   POC Glucose Once    Collection Time: 11/21/18 10:31 PM   Result Value Ref Range    Glucose 224 (H) 70 - 130 mg/dL   Hemoglobin & Hematocrit, Blood    Collection Time: 11/22/18  4:16 AM   Result Value Ref Range    Hemoglobin 7.6 (L) 11.9 - 15.5 g/dL    Hematocrit 24.1 (L) 35.6 - 45.5 %   POC Glucose Once    Collection Time: 11/22/18  6:09 AM   Result Value Ref Range    Glucose 190 (H) 70 - 130 mg/dL       Radiology:  Imaging Results (last 24 hours)     Procedure Component Value Units Date/Time    XR Foot 3+ View Left [[id number]] Collected:  11/21/18 1318     Updated:  11/21/18 1323    Narrative:       XR FOOT 3+ VW LEFT-     INDICATIONS:    Left foot pain     TECHNIQUE: 3 VIEWS OF THE LEFT FOOT     COMPARISON: None available     FINDINGS:      No acute fracture or erosion is identified. Moderate calcaneal  spurring. Sclerotic focus at the mid calcaneus, although nonspecific,  may represent bone island. No dislocation.  Small arterial calcification  is suggested.       Impression:          No acute fracture or erosion is identified. If there is clinical  suspicion for radiographically occult osteomyelitis or insufficiency  fracture, MRI or bone scan could be considered for further evaluation.     This  report was finalized on 11/21/2018 1:20 PM by Dr. Aleks Waters M.D.                Medications have been reviewed:  Current Facility-Administered Medications   Medication Dose Route Frequency Provider Last Rate Last Dose   • acetaminophen (TYLENOL) tablet 650 mg  650 mg Oral Q4H PRN Izzy Owusu APRN   650 mg at 11/21/18 0137   • atorvastatin (LIPITOR) tablet 10 mg  10 mg Oral Nightly Tigre Hoffman MD   10 mg at 11/21/18 2032   • colchicine tablet 0.6 mg  0.6 mg Oral Q12H Tigre Hoffman MD       • cyclobenzaprine (FLEXERIL) tablet 5 mg  5 mg Oral BID PRN Tigre Hoffman MD   5 mg at 11/22/18 0611   • dextrose (D50W) 25 g/ 50mL Intravenous Solution 25 g  25 g Intravenous Q15 Min PRN Izzy Owusu APRN       • dextrose (GLUTOSE) oral gel 15 g  15 g Oral Q15 Min PRN Izzy Owusu APRFRANCIS       • DULoxetine (CYMBALTA) DR capsule 30 mg  30 mg Oral BID Tigre Hoffman MD   30 mg at 11/22/18 0856   • epoetin chu (EPOGEN,PROCRIT) injection 20,000 Units  20,000 Units Subcutaneous Weekly Yasmani Baugh MD   20,000 Units at 11/21/18 1220   • furosemide (LASIX) tablet 20 mg  20 mg Oral Daily Tigre Hoffman MD   20 mg at 11/22/18 0856   • gabapentin (NEURONTIN) capsule 400 mg  400 mg Oral TID Tigre Hoffman MD   400 mg at 11/22/18 0856   • gabapentin (NEURONTIN) capsule 400 mg  400 mg Oral Nightly Tigre Hoffman MD   400 mg at 11/21/18 2033   • glucagon (human recombinant) (GLUCAGEN DIAGNOSTIC) injection 1 mg  1 mg Subcutaneous PRN Izzy Owusu APRN       • insulin glargine (LANTUS) injection 35 Units  35 Units Subcutaneous Nightly Tigre Hoffman MD   35 Units at 11/21/18 2244   • insulin lispro (humaLOG) injection 0-7 Units  0-7 Units Subcutaneous 4x Daily With Meals & Nightly Izzy Owusu APRN   2 Units at 11/22/18 0856   • levothyroxine (SYNTHROID, LEVOTHROID) tablet 88 mcg  88 mcg Oral Q AM Tigre Hoffman MD   88 mcg at 11/22/18 0611   •  nitroglycerin (NITROSTAT) SL tablet 0.4 mg  0.4 mg Sublingual Q5 Min PRN Izzy Owusu APRN       • ondansetron (ZOFRAN) tablet 4 mg  4 mg Oral Q6H PRN Izzy Owusu APRN        Or   • ondansetron ODT (ZOFRAN-ODT) disintegrating tablet 4 mg  4 mg Oral Q6H PRN Izzy Owusu APRN        Or   • ondansetron (ZOFRAN) injection 4 mg  4 mg Intravenous Q6H PRN Izzy Owusu APRN   4 mg at 11/22/18 0013   • pantoprazole (PROTONIX) EC tablet 40 mg  40 mg Oral Tigre Sparks MD   40 mg at 11/22/18 0611   • potassium chloride (MICRO-K) CR capsule 10 mEq  10 mEq Oral QAM Tigre Hoffman MD   10 mEq at 11/22/18 0611   • sodium chloride 0.9 % flush 1-10 mL  1-10 mL Intravenous PRN Izzy Owusu APRN       • sodium chloride 0.9 % flush 3 mL  3 mL Intravenous Q12H Izzy Owusu APRN   3 mL at 11/22/18 0857       ASSESSMENT:  Chronic kidney disease stage III, fairly stable and near baseline, off IV fluids, labs pending  Acute on chronic anemia, some component of anemia related to chronic kidney disease.  Iron studies appear adequate  Acute blood loss anemia, off Eliquis   Type 2 diabetes mellitus  Hypertension  Obstructive sleep apnea  Obesity           PLAN:    Await labs, renal function has been stable and near usual baseline, volume and electrolytes stable today  Continue weekly Epogen  needs outpatient Epogen to help stabilize hgb levels and prevent future transfusions.   will arrange with CCP for Epogen to be given at the infusion center after discharge  Urine studies unremarkable, repeat labs in a.m.     Continue to monitor electrolytes and volume closely  Please call with any questions or concerns.         Yasmani Baugh MD   [facility]   Office phone number: [phone]  Answering service phone number: [phone]    11/22/18  10:59 AM      Dictation performed using Dragon dictation software

## 2018-11-22 NOTE — PROGRESS NOTES
"    DAILY PROGRESS NOTE  [facility]    Patient Identification:  Name: Joya Singh  Age: 78 y.o.  Sex: female  :  1940  MRN: [id number]         Primary Care Physician: Chari Mercado MD    Subjective:  Interval History: no new issues - main c/o is left foot - denies cp/sob - admits to generalized weakness     Objective:no fm - d/w rn     Scheduled Meds:  atorvastatin 10 mg Oral Nightly   colchicine 0.6 mg Oral Q12H   colchicine 1.2 mg Oral Once   DULoxetine 30 mg Oral BID   epoetin chu 20,000 Units Subcutaneous Weekly   furosemide 20 mg Oral Daily   gabapentin 400 mg Oral TID   gabapentin 400 mg Oral Nightly   insulin glargine 35 Units Subcutaneous Nightly   insulin lispro 0-7 Units Subcutaneous 4x Daily With Meals & Nightly   levothyroxine 88 mcg Oral Q AM   pantoprazole 40 mg Oral QAM   potassium chloride 10 mEq Oral QAM   sodium chloride 3 mL Intravenous Q12H     Continuous Infusions:     Vital signs in last 24 hours:  Temp:  [97.7 °F (36.5 °C)-98.7 °F (37.1 °C)] 98.7 °F (37.1 °C)  Heart Rate:  [84-87] 84  Resp:  [16-20] 16  BP: (125-157)/(48-64) 125/48    Intake/Output:    Intake/Output Summary (Last 24 hours) at 2018 0908  Last data filed at 2018 0806  Gross per 24 hour   Intake 870 ml   Output --   Net 870 ml       Exam:  /48 (BP Location: Right arm, Patient Position: Lying)   Pulse 84   Temp 98.7 °F (37.1 °C) (Oral)   Resp 16   Ht 157.5 cm (62.01\")   Wt 92 kg (202 lb 14.4 oz)   SpO2 93%   BMI 37.10 kg/m²     General Appearance:    Alert, cooperative, no distress, AAOx3, not toxic, chronically ill appearing                          Throat:   Lips, tongue, gums normal; oral mucosa pink and moist                           Neck:   Supple, symmetrical, trachea midline, no JVD                         Lungs:    Clear to auscultation bilaterally, respirations unlabored                          Heart:    Regular rate and rhythm, S1 and S2 normal                  " "Abdomen:     Soft, non-tender, bowel sounds active                 Extremities:   Left 2nd toe erythematous but not tender upon palpation - there some increased res streaking today but not sure cellulitic - most tender over bilateral malleoli?                        Pulses:   Pulses palpable in lower extremities                               Data Review:  Labs in chart were reviewed.    Assessment:  Active Hospital Problems    Diagnosis Date Noted   • **Acute blood loss anemia [D62] 11/19/2018   • 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) [E11.22, N18.3] 02/28/2017   • Chronic anemia [D64.9] 02/02/2016   • Hyperlipidemia [E78.5] 02/02/2016   • Hypertension [I10] 02/02/2016   • Obstructive sleep apnea syndrome [G47.33] 02/02/2016   • Chronic back pain [M54.9, G89.29] 02/02/2016   • Type 2 diabetes mellitus with neurologic complication (CMS/[facility]) [E11.49] 02/02/2016      Resolved Hospital Problems   No resolved problems to display.       Plan:  Anemia - GI plans for outpatient capsule               -Hgb holding at 7.9x2and 7.6 today - not comfy discharging when Hgb continues to decrease              -Renal dosing Epogen                -doubtful myelodysplasia and holding on BMBx - consider Heme              CKD3 - stable      DM2 - A1c 7.43 - basal bolus      HTN/HLD      Left ankle/foot pain - uric mildly elevated but doubt gout as I can squeeze the mildly erythematous toe and if anything she is more tender w/ ankles   - xray neg for fracture   -PT following   -get ortho input and will trial colchicine      Notified by CCP that not qualifying for inpatient - no plans for DC today - made patient aware of insurance/lack of coverage w/ additional stay - patient made comment \" I will just come right back\"        Tigre Hoffman MD  11/22/2018  9:08 AM    "

## 2018-11-22 NOTE — PLAN OF CARE
Problem: Patient Care Overview  Goal: Plan of Care Review  Outcome: Ongoing (interventions implemented as appropriate)   11/22/18 0922   Coping/Psychosocial   Plan of Care Reviewed With patient   Plan of Care Review   Progress improving   OTHER   Outcome Summary Pt increasing with strength and balance with use of RWX and 2L o2 to maintain safety

## 2018-11-22 NOTE — PLAN OF CARE
Problem: Patient Care Overview  Goal: Plan of Care Review  Outcome: Ongoing (interventions implemented as appropriate)   11/22/18 9984   Coping/Psychosocial   Plan of Care Reviewed With patient   Plan of Care Review   Progress no change   OTHER   Outcome Summary ortho consulted for pink painful left foot 2nd toe, monitor hgb, no acute changes, vss, will continue to montior      Goal: Individualization and Mutuality  Outcome: Ongoing (interventions implemented as appropriate)    Goal: Discharge Needs Assessment  Outcome: Ongoing (interventions implemented as appropriate)    Goal: Interprofessional Rounds/Family Conf  Outcome: Ongoing (interventions implemented as appropriate)      Problem: Fall Risk (Adult)  Goal: Identify Related Risk Factors and Signs and Symptoms  Outcome: Ongoing (interventions implemented as appropriate)    Goal: Absence of Fall  Outcome: Ongoing (interventions implemented as appropriate)      Problem: Nausea/Vomiting (Adult)  Goal: Identify Related Risk Factors and Signs and Symptoms  Outcome: Ongoing (interventions implemented as appropriate)    Goal: Symptom Relief  Outcome: Ongoing (interventions implemented as appropriate)    Goal: Adequate Hydration  Outcome: Ongoing (interventions implemented as appropriate)      Problem: Anemia (Adult)  Goal: Identify Related Risk Factors and Signs and Symptoms  Outcome: Ongoing (interventions implemented as appropriate)    Goal: Symptom Improvement  Outcome: Ongoing (interventions implemented as appropriate)

## 2018-11-23 ENCOUNTER — APPOINTMENT (OUTPATIENT)
Dept: MRI IMAGING | Facility: HOSPITAL | Age: 78
End: 2018-11-23
Attending: ORTHOPAEDIC SURGERY

## 2018-11-23 PROBLEM — M10.9 GOUT ATTACK: Status: ACTIVE | Noted: 2018-11-23

## 2018-11-23 LAB
ANION GAP SERPL CALCULATED.3IONS-SCNC: 10.4 MMOL/L
BUN BLD-MCNC: 20 MG/DL (ref 8–23)
BUN/CREAT SERPL: 11 (ref 7–25)
CALCIUM SPEC-SCNC: 8.8 MG/DL (ref 8.6–10.5)
CHLORIDE SERPL-SCNC: 99 MMOL/L (ref 98–107)
CO2 SERPL-SCNC: 29.6 MMOL/L (ref 22–29)
CREAT BLD-MCNC: 1.82 MG/DL (ref 0.57–1)
CRP SERPL-MCNC: 13.78 MG/DL (ref 0–0.5)
ERYTHROCYTE [SEDIMENTATION RATE] IN BLOOD: >140 MM/HR (ref 0–30)
GFR SERPL CREATININE-BSD FRML MDRD: 27 ML/MIN/1.73
GLUCOSE BLD-MCNC: 289 MG/DL (ref 65–99)
GLUCOSE BLDC GLUCOMTR-MCNC: 254 MG/DL (ref 70–130)
GLUCOSE BLDC GLUCOMTR-MCNC: 269 MG/DL (ref 70–130)
GLUCOSE BLDC GLUCOMTR-MCNC: 274 MG/DL (ref 70–130)
GLUCOSE BLDC GLUCOMTR-MCNC: 356 MG/DL (ref 70–130)
HCT VFR BLD AUTO: 24.8 % (ref 35.6–45.5)
HGB BLD-MCNC: 7.8 G/DL (ref 11.9–15.5)
POTASSIUM BLD-SCNC: 4.8 MMOL/L (ref 3.5–5.2)
SODIUM BLD-SCNC: 139 MMOL/L (ref 136–145)
URATE SERPL-MCNC: 8.5 MG/DL (ref 2.4–5.7)

## 2018-11-23 PROCEDURE — 63710000001 INSULIN GLARGINE PER 5 UNITS: Performed by: HOSPITALIST

## 2018-11-23 PROCEDURE — 63710000001 INSULIN LISPRO (HUMAN) PER 5 UNITS: Performed by: ORTHOPAEDIC SURGERY

## 2018-11-23 PROCEDURE — 85652 RBC SED RATE AUTOMATED: CPT | Performed by: ORTHOPAEDIC SURGERY

## 2018-11-23 PROCEDURE — 85014 HEMATOCRIT: CPT | Performed by: HOSPITALIST

## 2018-11-23 PROCEDURE — 63710000001 METHYLPREDNISOLONE 4 MG TABLET THERAPY PACK 21 EACH DISP PACK: Performed by: ORTHOPAEDIC SURGERY

## 2018-11-23 PROCEDURE — G0378 HOSPITAL OBSERVATION PER HR: HCPCS

## 2018-11-23 PROCEDURE — 25010000002 ONDANSETRON PER 1 MG: Performed by: NURSE PRACTITIONER

## 2018-11-23 PROCEDURE — 99213 OFFICE O/P EST LOW 20 MIN: CPT | Performed by: INTERNAL MEDICINE

## 2018-11-23 PROCEDURE — 85018 HEMOGLOBIN: CPT | Performed by: HOSPITALIST

## 2018-11-23 PROCEDURE — 82962 GLUCOSE BLOOD TEST: CPT

## 2018-11-23 PROCEDURE — 80048 BASIC METABOLIC PNL TOTAL CA: CPT | Performed by: INTERNAL MEDICINE

## 2018-11-23 PROCEDURE — 97110 THERAPEUTIC EXERCISES: CPT

## 2018-11-23 PROCEDURE — 84550 ASSAY OF BLOOD/URIC ACID: CPT | Performed by: ORTHOPAEDIC SURGERY

## 2018-11-23 PROCEDURE — 96376 TX/PRO/DX INJ SAME DRUG ADON: CPT

## 2018-11-23 PROCEDURE — 63710000001 INSULIN LISPRO (HUMAN) PER 5 UNITS: Performed by: NURSE PRACTITIONER

## 2018-11-23 PROCEDURE — 73718 MRI LOWER EXTREMITY W/O DYE: CPT

## 2018-11-23 PROCEDURE — 86140 C-REACTIVE PROTEIN: CPT | Performed by: ORTHOPAEDIC SURGERY

## 2018-11-23 RX ORDER — METHYLPREDNISOLONE 4 MG/1
4 TABLET ORAL
Status: DISCONTINUED | OUTPATIENT
Start: 2018-11-26 | End: 2018-11-25 | Stop reason: HOSPADM

## 2018-11-23 RX ORDER — METHYLPREDNISOLONE 4 MG/1
4 TABLET ORAL
Status: DISCONTINUED | OUTPATIENT
Start: 2018-11-25 | End: 2018-11-25 | Stop reason: HOSPADM

## 2018-11-23 RX ORDER — COLCHICINE 0.6 MG/1
1.2 TABLET ORAL DAILY
Status: COMPLETED | OUTPATIENT
Start: 2018-11-23 | End: 2018-11-23

## 2018-11-23 RX ORDER — METHYLPREDNISOLONE 4 MG/1
4 TABLET ORAL
Status: COMPLETED | OUTPATIENT
Start: 2018-11-23 | End: 2018-11-23

## 2018-11-23 RX ORDER — METHYLPREDNISOLONE 4 MG/1
4 TABLET ORAL
Status: COMPLETED | OUTPATIENT
Start: 2018-11-24 | End: 2018-11-24

## 2018-11-23 RX ORDER — INSULIN GLARGINE 100 [IU]/ML
40 INJECTION, SOLUTION SUBCUTANEOUS NIGHTLY
Status: DISCONTINUED | OUTPATIENT
Start: 2018-11-23 | End: 2018-11-24

## 2018-11-23 RX ORDER — DEXTROSE MONOHYDRATE 25 G/50ML
25 INJECTION, SOLUTION INTRAVENOUS
Status: DISCONTINUED | OUTPATIENT
Start: 2018-11-23 | End: 2018-11-25 | Stop reason: HOSPADM

## 2018-11-23 RX ORDER — METHYLPREDNISOLONE 4 MG/1
8 TABLET ORAL
Status: COMPLETED | OUTPATIENT
Start: 2018-11-24 | End: 2018-11-24

## 2018-11-23 RX ORDER — COLCHICINE 0.6 MG/1
0.6 TABLET ORAL
Status: COMPLETED | OUTPATIENT
Start: 2018-11-23 | End: 2018-11-23

## 2018-11-23 RX ORDER — METHYLPREDNISOLONE 4 MG/1
4 TABLET ORAL
Status: DISCONTINUED | OUTPATIENT
Start: 2018-11-27 | End: 2018-11-25 | Stop reason: HOSPADM

## 2018-11-23 RX ORDER — METHYLPREDNISOLONE 4 MG/1
4 TABLET ORAL
Status: DISCONTINUED | OUTPATIENT
Start: 2018-11-28 | End: 2018-11-25 | Stop reason: HOSPADM

## 2018-11-23 RX ORDER — METHYLPREDNISOLONE 4 MG/1
8 TABLET ORAL
Status: COMPLETED | OUTPATIENT
Start: 2018-11-23 | End: 2018-11-23

## 2018-11-23 RX ORDER — NICOTINE POLACRILEX 4 MG
15 LOZENGE BUCCAL
Status: DISCONTINUED | OUTPATIENT
Start: 2018-11-23 | End: 2018-11-25 | Stop reason: HOSPADM

## 2018-11-23 RX ADMIN — METHYLPREDNISOLONE 4 MG: 4 TABLET ORAL at 15:56

## 2018-11-23 RX ADMIN — LEVOTHYROXINE SODIUM 88 MCG: 88 TABLET ORAL at 05:26

## 2018-11-23 RX ADMIN — METHYLPREDNISOLONE 8 MG: 4 TABLET ORAL at 21:18

## 2018-11-23 RX ADMIN — SODIUM CHLORIDE, PRESERVATIVE FREE 3 ML: 5 INJECTION INTRAVENOUS at 08:22

## 2018-11-23 RX ADMIN — GABAPENTIN 400 MG: 400 CAPSULE ORAL at 21:17

## 2018-11-23 RX ADMIN — INSULIN LISPRO 4 UNITS: 100 INJECTION, SOLUTION INTRAVENOUS; SUBCUTANEOUS at 08:22

## 2018-11-23 RX ADMIN — DULOXETINE HYDROCHLORIDE 30 MG: 30 CAPSULE, DELAYED RELEASE ORAL at 21:17

## 2018-11-23 RX ADMIN — INSULIN LISPRO 8 UNITS: 100 INJECTION, SOLUTION INTRAVENOUS; SUBCUTANEOUS at 21:18

## 2018-11-23 RX ADMIN — COLCHICINE 0.6 MG: 0.6 TABLET, FILM COATED ORAL at 15:57

## 2018-11-23 RX ADMIN — ACETAMINOPHEN 650 MG: 325 TABLET, FILM COATED ORAL at 15:56

## 2018-11-23 RX ADMIN — INSULIN LISPRO 4 UNITS: 100 INJECTION, SOLUTION INTRAVENOUS; SUBCUTANEOUS at 12:22

## 2018-11-23 RX ADMIN — ACETAMINOPHEN 650 MG: 325 TABLET, FILM COATED ORAL at 10:54

## 2018-11-23 RX ADMIN — POTASSIUM CHLORIDE 10 MEQ: 750 CAPSULE, EXTENDED RELEASE ORAL at 05:26

## 2018-11-23 RX ADMIN — ACETAMINOPHEN 650 MG: 325 TABLET, FILM COATED ORAL at 05:26

## 2018-11-23 RX ADMIN — COLCHICINE 0.6 MG: 0.6 TABLET, FILM COATED ORAL at 17:00

## 2018-11-23 RX ADMIN — FUROSEMIDE 20 MG: 20 TABLET ORAL at 08:22

## 2018-11-23 RX ADMIN — COLCHICINE 1.2 MG: 0.6 TABLET, FILM COATED ORAL at 12:21

## 2018-11-23 RX ADMIN — PANTOPRAZOLE SODIUM 40 MG: 40 TABLET, DELAYED RELEASE ORAL at 05:26

## 2018-11-23 RX ADMIN — GABAPENTIN 400 MG: 400 CAPSULE ORAL at 12:22

## 2018-11-23 RX ADMIN — ATORVASTATIN CALCIUM 10 MG: 10 TABLET, FILM COATED ORAL at 21:18

## 2018-11-23 RX ADMIN — DULOXETINE HYDROCHLORIDE 30 MG: 30 CAPSULE, DELAYED RELEASE ORAL at 08:22

## 2018-11-23 RX ADMIN — ACETAMINOPHEN 650 MG: 325 TABLET, FILM COATED ORAL at 21:33

## 2018-11-23 RX ADMIN — SODIUM CHLORIDE, PRESERVATIVE FREE 3 ML: 5 INJECTION INTRAVENOUS at 21:19

## 2018-11-23 RX ADMIN — INSULIN LISPRO 4 UNITS: 100 INJECTION, SOLUTION INTRAVENOUS; SUBCUTANEOUS at 18:24

## 2018-11-23 RX ADMIN — METHYLPREDNISOLONE 4 MG: 4 TABLET ORAL at 18:24

## 2018-11-23 RX ADMIN — INSULIN GLARGINE 40 UNITS: 100 INJECTION, SOLUTION SUBCUTANEOUS at 21:19

## 2018-11-23 RX ADMIN — COLCHICINE 0.6 MG: 0.6 TABLET, FILM COATED ORAL at 08:22

## 2018-11-23 RX ADMIN — ONDANSETRON 4 MG: 2 INJECTION INTRAMUSCULAR; INTRAVENOUS at 21:38

## 2018-11-23 RX ADMIN — METHYLPREDNISOLONE 8 MG: 4 TABLET ORAL at 12:22

## 2018-11-23 RX ADMIN — GABAPENTIN 400 MG: 400 CAPSULE ORAL at 08:22

## 2018-11-23 NOTE — PROGRESS NOTES
DAILY PROGRESS NOTE  [facility]    Patient Identification:  Name: Joya Singh  Age: 78 y.o.  Sex: female  :  1940  MRN: [id number]         Primary Care Physician: Chari Mercado MD    Subjective:  Interval History: feeling better - increased ambulation regarding left foot pain - still weak from anemia     Objective:no fm     Scheduled Meds:  atorvastatin 10 mg Oral Nightly   DULoxetine 30 mg Oral BID   epoetin chu 20,000 Units Subcutaneous Weekly   furosemide 20 mg Oral Daily   gabapentin 400 mg Oral TID   gabapentin 400 mg Oral Nightly   insulin glargine 40 Units Subcutaneous Nightly   insulin lispro 0-7 Units Subcutaneous 4x Daily With Meals & Nightly   insulin lispro 0-9 Units Subcutaneous 4x Daily With Meals & Nightly   levothyroxine 88 mcg Oral Q AM   MethylPREDNISolone 4 mg Oral After Lunch   MethylPREDNISolone 4 mg Oral After Dinner   [START ON 2018] MethylPREDNISolone 4 mg Oral TID Around Food   [START ON 2018] MethylPREDNISolone 4 mg Oral 4x Daily Taper   [START ON 2018] MethylPREDNISolone 4 mg Oral TID Around Food   [START ON 2018] MethylPREDNISolone 4 mg Oral Before Breakfast   [START ON 2018] MethylPREDNISolone 4 mg Oral Tonight   [START ON 2018] MethylPREDNISolone 4 mg Oral Before Breakfast   MethylPREDNISolone 8 mg Oral Tonight   [START ON 2018] MethylPREDNISolone 8 mg Oral Tonight   pantoprazole 40 mg Oral QAM   potassium chloride 10 mEq Oral QAM   sodium chloride 3 mL Intravenous Q12H     Continuous Infusions:     Vital signs in last 24 hours:  Temp:  [98 °F (36.7 °C)-99.4 °F (37.4 °C)] 98 °F (36.7 °C)  Heart Rate:  [70-87] 70  Resp:  [16-18] 18  BP: (126-139)/(46-61) 126/56    Intake/Output:    Intake/Output Summary (Last 24 hours) at 2018 1556  Last data filed at 2018 0835  Gross per 24 hour   Intake 330 ml   Output --   Net 330 ml       Exam:  /56 (BP Location: Right arm, Patient Position: Lying)    "Pulse 70   Temp 98 °F (36.7 °C) (Oral)   Resp 18   Ht 157.5 cm (62.01\")   Wt 92 kg (202 lb 14.4 oz)   SpO2 93%   BMI 37.10 kg/m²     General Appearance:    Alert, cooperative, no distress, AAOx3                           Neck:   No JVD                         Lungs:    Clear to auscultation bilaterally, respirations unlabored                          Heart:    Regular rate and rhythm, S1 and S2 normal                  Abdomen:     Soft, non-tender, bowel sounds active                 Extremities:   Left toe erythema w/out streaking up foot - ttp 2nd toe minimal                         Pulses:   Pulses palpable in lower extremities                                Data Review:  Labs in chart were reviewed.    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       Plan:  Anemia - GI plans for outpatient capsule               -Hgb holding at 7.9x2-7.6-7.8 today - Hgb finally stabilizing              -Renal dosing Epogen                -doubtful myelodysplasia and holding on BMBx - consider Heme        CKD3 - stable      DM2 - A1c 7.43 - increase basal due to reactive hyperglycemia       HTN/HLD      Left ankle/foot pain - uric mildly elevated   - xray neg for fracture - Ortho consulted and now MRI pending for possible aspiration   -PT following   -Trial colchicine and now steroids added    -ESR/CRP quite elevated      Dispo - maybe tomorrow - HH - appreciate input and " assistance from ALL    Tigre Hoffman MD  11/23/2018  3:56 PM

## 2018-11-23 NOTE — CONSULTS
ORTHOPEDIC SURGERY CONSULT      Patient: Joya Singh  Date of Admission: 11/19/2018 10:31 PM  YOB: 1940  Medical Record Number: [id number]  Attending Physician: Tigre Hoffman MD  Consulting Physician: Luis Angel Hernandez MD    CHIEF COMPLIANT: Left foot pain.    HISTORY OF PRESENT ILLINESS: Patient is a 78 y.o. year old female presents to [facility] with history of anemia and GI bleed.  During her hospital stay, she developed left 2nd toe pain without trauma that was inhibiting her ability to ambulate.  She was found to have elevated uric acid and was given colchicine, though has not had any improvement (though yet to have any diarrhea.)   She reports the pain has now moved up to the middle of her foot area.    I was consulted for further evaluation and treatment.     ALLERGIES:   Allergies   Allergen Reactions   • Lisinopril Cough   • Baclofen Anxiety     Panic attack, nightmares   • Codeine Itching and Rash   • Morphine Hives   • Penicillins Rash     Tolerates cephalosporins        HOME MEDICATIONS:  Medications Prior to Admission   Medication Sig Dispense Refill Last Dose   • acetaminophen (TYLENOL) 325 MG tablet Take 2 tablets by mouth Every 6 (Six) Hours As Needed for Mild Pain . OTC product 40 tablet 0 11/18/2018 at Unknown time   • atorvastatin (LIPITOR) 10 MG tablet Take 10 mg by mouth Every Night.   11/18/2018 at 2100   • DULoxetine (CYMBALTA) 30 MG capsule Take 30 mg by mouth 2 (Two) Times a Day.   11/19/2018 at Unknown time   • ELIQUIS 2.5 MG tablet tablet TAKE ONE (1) TABLET ORALLY (BY MOUTH) TWICE DAILY 60 tablet 1 11/19/2018 at Unknown time   • furosemide (LASIX) 20 MG tablet Take 1 tablet by mouth Daily.   11/19/2018 at Unknown time   • gabapentin (NEURONTIN) 400 MG capsule Take 400 mg by mouth 3 (Three) Times a Day. 400 mg AM, 400 mg NOON, 800 mg QHS   11/19/2018 at Unknown time   • insulin aspart (novoLOG FLEXPEN) 100 UNIT/ML solution pen-injector sc pen Inject 30  Units under the skin into the appropriate area as directed Every Morning Before Breakfast. 30 units with breakfast 35 units with lunch 35 units with dinner   11/19/2018 at Unknown time   • insulin aspart (novoLOG) 100 UNIT/ML injection Inject 35 Units under the skin into the appropriate area as directed Daily Before Lunch. 30 units with breakfast 35 units with lunch 35 units with dinner   11/19/2018 at Unknown time   • insulin aspart (novoLOG) 100 UNIT/ML injection Inject 35 Units under the skin into the appropriate area as directed Daily Before Supper. 30 units with breakfast 35 units with lunch 35 units with dinner   11/19/2018 at Unknown time   • Insulin Degludec (TRESIBA FLEXTOUCH) 200 UNIT/ML solution pen-injector Inject 50 Units under the skin into the appropriate area as directed every night at bedtime.   11/18/2018   • levothyroxine (SYNTHROID) 88 MCG tablet Take 1 tablet by mouth Daily. 30 tablet 3 11/19/2018 at Unknown time   • midodrine (PROAMATINE) 2.5 MG tablet Take 1 tablet by mouth 2 (Two) Times a Day. (Patient taking differently: Take 2.5 mg by mouth 2 (Two) Times a Day. Takes QAM and NOON) 90 tablet 1 11/19/2018 at Unknown time   • O2 (OXYGEN) Inhale 2 L/min Every Night. Uses 2L  overnight only   Taking   • omeprazole (priLOSEC) 40 MG capsule Take one (1) capsule orally (by mouth) once daily 90 capsule 1 11/19/2018 at Unknown time   • potassium chloride (MICRO-K) 10 MEQ CR capsule Take 10 mEq by mouth Every Morning.   11/19/2018 at Unknown time   • ACCU-CHEK FASTCLIX LANCETS misc TEST 3-4 TIMES DAILY AS DIRECTED 400 each 3 Taking   • ACCU-CHEK SMARTVIEW test strip TEST blood sugar three times daily OR AS DIRECTED 300 each 3 Taking   • Alcohol Swabs (B-D SINGLE USE SWABS REGULAR) pads    Taking   • Blood Glucose Monitoring Suppl (ACCU-CHEK KENNETH SMARTVIEW) w/Device kit TEST blood sugar three times daily or as directed 1 kit 0 Taking   • gabapentin (NEURONTIN) 400 MG capsule Take 400 mg by mouth every  "night at bedtime. 400 mg AM, 400 mg NOON, 800 mg QHS   11/18/2018   • Needle, Disp, (BD DISP NEEDLES) 30G X 1/2\" misc To be used 3 times daily with Novolog Flexpen. 100 each 5 Taking   • promethazine (PHENERGAN) 12.5 MG tablet 1/2 to 1 tablet every 6 hours as needed for nausea 20 tablet 0 Unknown at Unknown time       CURRENT MEDICATIONS:  Scheduled Meds:  atorvastatin 10 mg Oral Nightly   colchicine 0.6 mg Oral Q1H   colchicine 1.2 mg Oral Daily   DULoxetine 30 mg Oral BID   epoetin chu 20,000 Units Subcutaneous Weekly   furosemide 20 mg Oral Daily   gabapentin 400 mg Oral TID   gabapentin 400 mg Oral Nightly   insulin glargine 35 Units Subcutaneous Nightly   insulin lispro 0-7 Units Subcutaneous 4x Daily With Meals & Nightly   insulin lispro 0-9 Units Subcutaneous 4x Daily With Meals & Nightly   levothyroxine 88 mcg Oral Q AM   MethylPREDNISolone 4 mg Oral After Lunch   MethylPREDNISolone 4 mg Oral After Dinner   [START ON 11/24/2018] MethylPREDNISolone 4 mg Oral TID Around Food   [START ON 11/25/2018] MethylPREDNISolone 4 mg Oral 4x Daily Taper   [START ON 11/26/2018] MethylPREDNISolone 4 mg Oral TID Around Food   [START ON 11/27/2018] MethylPREDNISolone 4 mg Oral Before Breakfast   [START ON 11/27/2018] MethylPREDNISolone 4 mg Oral Tonight   [START ON 11/28/2018] MethylPREDNISolone 4 mg Oral Before Breakfast   MethylPREDNISolone 8 mg Oral Before Breakfast   MethylPREDNISolone 8 mg Oral Tonight   [START ON 11/24/2018] MethylPREDNISolone 8 mg Oral Tonight   pantoprazole 40 mg Oral QAM   potassium chloride 10 mEq Oral QAM   sodium chloride 3 mL Intravenous Q12H     Continuous Infusions:   PRN Meds:.•  acetaminophen  •  cyclobenzaprine  •  dextrose  •  dextrose  •  dextrose  •  dextrose  •  glucagon (human recombinant)  •  glucagon (human recombinant)  •  nitroglycerin  •  ondansetron **OR** ondansetron ODT **OR** ondansetron  •  sodium chloride    Past Medical History:   Diagnosis Date   • Allergic rhinitis    • " Anxiety    • Arthritis    • Bell's palsy    • CKD (chronic kidney disease) stage 3, GFR 30-59 ml/min (CMS/[facility])    • Depression    • Diabetes mellitus (CMS/[facility])     LAST A1C 6   • Diabetic gastroparesis (CMS/[facility]) 2/19/2016   • GERD (gastroesophageal reflux disease)    • GI bleed    • H/O blood clots     LEFT LEG 7 OR 8 YEARS AGO   • History of transfusion    • Hyperlipidemia    • Hypertension    • Hypothyroidism    • Normal coronary arteries     by cath 2013   • AARON (obstructive sleep apnea)     DOESNT WEAR REGULARLY   • PONV (postoperative nausea and vomiting)    • Skin cancer    • Stroke (CMS/[facility])    • TIA (transient ischemic attack)     LAST TIA JULY 2017     Past Surgical History:   Procedure Laterality Date   • BACK SURGERY      HARDWARE   • CHOLECYSTECTOMY      OPEN   • COLONOSCOPY  2011    due for repeat in 2021   • HYSTERECTOMY      PARTIAL    • NECK SURGERY     • SPINE SURGERY     • UPPER GASTROINTESTINAL ENDOSCOPY  2014    gastritis.  done by dr. hastings     Social History     Occupational History   • Not on file   Tobacco Use   • Smoking status: Never Smoker   • Smokeless tobacco: Never Used   • Tobacco comment: CAFFEINE USE: NONE   Substance and Sexual Activity   • Alcohol use: No   • Drug use: No   • Sexual activity: Defer     Comment: EXERCISE - RARELY    Social History     Social History Narrative   • Not on file     Family History   Problem Relation Age of Onset   • Lupus Mother    • Heart failure Mother 59   • Heart disease Other    • Hypertension Other    • Heart attack Father        REVIEW OF SYSTEMS:    HEENT: Patient denies any headaches, vision changes, change in hearing, or tinnitus, Patient denies epistaxis, sinus pain, hoarseness, or dysphagia   Pulmonary: Patient denies any cough, congestion, acute change in SOA or wheezing.   Cardiovascular: Patient denies any change in chest pain, dyspnea, palpitations, weakness, intolerance of exercise, varicosities, change in murmur   Gastrointestinal:   Patient denies change in appetite, melena, change in bowel habits.   Genital/Urinary: Patient denies dysuria, change in color of urine, change in frequency of urination, pain with urgency, change in incontinence, retention.   Musculoskeletal: Patient denies complaints of acute changes in symptoms of other joints not mentioned above.   Neurological: Patient denies changes in dizziness, tremor, ataxia, or difficulty in speaking or changes in memory.   Endocrine system: Patient denies acute changes in tremors, palpitations, polyuria, polydipsia, polyphagia, diaphoresis, exophthalmos, or goiter.   Psychological: Patient denies thoughts/plans or harming self or other; denies acute changes in depression,  insomnia, night terrors, chandler, disorientation.   Skin: Patient denies any bruising, rashes, discoloration, pruritus,or wounds not mentioned in history of present illness or chief complaint above.   Hematopoietic: Patient denies current bleeding, epistaxis, hematuria, or melena.    PHYSICAL EXAM:   Vitals:  Vitals:    11/22/18 1545 11/22/18 1913 11/22/18 2325 11/23/18 0755   BP: 118/70 130/61 139/60 130/46   BP Location: Right arm Right arm Right arm Right arm   Patient Position: Lying Lying Lying Lying   Pulse: 82 84 87 79   Resp: 16 16 18 18   Temp: 99.2 °F (37.3 °C) 99.4 °F (37.4 °C) 98.2 °F (36.8 °C) 98.3 °F (36.8 °C)   TempSrc: Oral Oral Oral Oral   SpO2: 93% 93% 94%    Weight:       Height:           General:  78 y.o. female who appears about stated age.    Alert, cooperative, in no acute distress         Head:    Normocephalic, without obvious abnormality, atraumatic   Eyes:            Lids and lashes normal, conjunctivae and sclerae normal, no         icterus, no pallor, corneas clear, PERRLA   Ears:    Ears appear intact with no abnormalities noted   Throat:   No oral lesions, no thrush, oral mucosa moist   Neck:   No adenopathy, supple, trachea midline, no JVD   Back:     Limited exam shows no severe kyphosis  present,no visible           erythema, no excessive  tenderness to palpation.    Lungs:     Respirations regular, even and unlabored.     Heart:    Normal rate, Pulses palpable   Chest Wall:    No abnormalities observed.   Abdomen:     Normal bowel sounds, no masses, no organomegaly, soft              non-tender, non-distended, no guarding, no rebound                      tenderness   Rectal:     Deferred   Pulses:   Pulses palpable and equal bilaterally   Skin:   No bleeding, bruising or rash   Lymph nodes:   No palpable adenopathy   Extremities:     Left foot: skin intact.  Erythema present over 2nd PIP joint as well as diffuse erythema over midfoot area.  Tender to palpation over 2nd toe and midfoot and medial area.  Painless dorsiflexion of ankle mortise and inversion/eversion of hindfoot.  Mild warmth is present.      DIAGNOSTIC TEST:  Admission on 11/19/2018   Component Date Value Ref Range Status   • WBC 11/19/2018 6.02  4.50 - 10.70 10*3/mm3 Final   • RBC 11/19/2018 2.49* 3.90 - 5.20 10*6/mm3 Final   • Hemoglobin 11/19/2018 7.9* 11.9 - 15.5 g/dL Final   • Hematocrit 11/19/2018 24.5* 35.6 - 45.5 % Final   • MCV 11/19/2018 98.4* 80.5 - 98.2 fL Final   • MCH 11/19/2018 31.7  26.9 - 32.0 pg Final   • MCHC 11/19/2018 32.2* 32.4 - 36.3 g/dL Final   • RDW 11/19/2018 18.3* 11.7 - 13.0 % Final   • RDW-SD 11/19/2018 63.2* 37.0 - 54.0 fl Final   • MPV 11/19/2018 10.8  6.0 - 12.0 fL Final   • Platelets 11/19/2018 411  140 - 500 10*3/mm3 Final   • Neutrophil % 11/19/2018 46.5  42.7 - 76.0 % Final   • Lymphocyte % 11/19/2018 42.2  19.6 - 45.3 % Final   • Monocyte % 11/19/2018 3.7* 5.0 - 12.0 % Final   • Eosinophil % 11/19/2018 6.3* 0.3 - 6.2 % Final   • Basophil % 11/19/2018 1.3  0.0 - 1.5 % Final   • Immature Grans % 11/19/2018 0.2  0.0 - 0.5 % Final   • Neutrophils, Absolute 11/19/2018 2.80  1.90 - 8.10 10*3/mm3 Final   • Lymphocytes, Absolute 11/19/2018 2.54  0.90 - 4.80 10*3/mm3 Final   • Monocytes, Absolute  11/19/2018 0.22  0.20 - 1.20 10*3/mm3 Final   • Eosinophils, Absolute 11/19/2018 0.38  0.00 - 0.70 10*3/mm3 Final   • Basophils, Absolute 11/19/2018 0.08  0.00 - 0.20 10*3/mm3 Final   • Immature Grans, Absolute 11/19/2018 0.01  0.00 - 0.03 10*3/mm3 Final   • nRBC 11/19/2018 0.0  0.0 - 0.0 /100 WBC Final   • Iron 11/19/2018 178* 37 - 145 mcg/dL Final   • Iron Saturation 11/19/2018 78* 20 - 50 % Final   • Transferrin 11/19/2018 153* 200 - 360 mg/dL Final   • TIBC 11/19/2018 228  mcg/dL Final   • Vitamin B-12 11/19/2018 397  211 - 946 pg/mL Final   • Folate 11/19/2018 >20.00  4.78 - 24.20 ng/mL Final   • Glucose 11/20/2018 144* 65 - 99 mg/dL Final   • BUN 11/20/2018 21  8 - 23 mg/dL Final   • Creatinine 11/20/2018 1.74* 0.57 - 1.00 mg/dL Final   • Sodium 11/20/2018 144  136 - 145 mmol/L Final   • Potassium 11/20/2018 4.5  3.5 - 5.2 mmol/L Final   • Chloride 11/20/2018 104  98 - 107 mmol/L Final   • CO2 11/20/2018 28.3  22.0 - 29.0 mmol/L Final   • Calcium 11/20/2018 8.8  8.6 - 10.5 mg/dL Final   • eGFR Non African Amer 11/20/2018 28* >60 mL/min/1.73 Final   • BUN/Creatinine Ratio 11/20/2018 12.1  7.0 - 25.0 Final   • Anion Gap 11/20/2018 11.7  mmol/L Final   • Hemoglobin A1C 11/20/2018 7.43* 4.80 - 5.60 % Final   • Hemoglobin 11/20/2018 7.6* 11.9 - 15.5 g/dL Final   • Hematocrit 11/20/2018 23.6* 35.6 - 45.5 % Final   • Glucose 11/20/2018 142* 70 - 130 mg/dL Final   • Reticulocyte % 11/20/2018 1.86* 0.50 - 1.50 % Final   • Glucose 11/20/2018 100  70 - 130 mg/dL Final   • Glucose 11/20/2018 178* 70 - 130 mg/dL Final   • Ferritin 11/21/2018 338.60* 13.00 - 150.00 ng/mL Final   • Iron 11/21/2018 53  37 - 145 mcg/dL Final   • Iron Saturation 11/21/2018 24  20 - 50 % Final   • Transferrin 11/21/2018 146* 200 - 360 mg/dL Final   • TIBC 11/21/2018 218  298 - 536 mcg/dL Final   • Immature Reticulocyte Fraction 11/21/2018 18.1* 0.7 - 13.7 % Final   • Reticulocyte % 11/21/2018 1.71* 0.50 - 1.50 % Final   • Reticulocyte Hgb  11/21/2018 40.2* 32.7 - 38.6 pg Final   • WBC 11/21/2018 6.49  4.50 - 10.70 10*3/mm3 Final   • RBC 11/21/2018 2.49* 3.90 - 5.20 10*6/mm3 Final   • Hemoglobin 11/21/2018 7.9* 11.9 - 15.5 g/dL Final   • Hematocrit 11/21/2018 24.6* 35.6 - 45.5 % Final   • MCV 11/21/2018 98.8* 80.5 - 98.2 fL Final   • MCH 11/21/2018 31.7  26.9 - 32.0 pg Final   • MCHC 11/21/2018 32.1* 32.4 - 36.3 g/dL Final   • RDW 11/21/2018 18.4* 11.7 - 13.0 % Final   • RDW-SD 11/21/2018 63.7* 37.0 - 54.0 fl Final   • MPV 11/21/2018 10.8  6.0 - 12.0 fL Final   • Platelets 11/21/2018 412  140 - 500 10*3/mm3 Final   • Neutrophil % 11/21/2018 62.0  42.7 - 76.0 % Corrected    Corrected result. Previous result was 52.0 % on 11/21/2018 at 0653 EST.   • Lymphocyte % 11/21/2018 23.0  19.6 - 45.3 % Corrected    Corrected result. Previous result was 37.0 % on 11/21/2018 at 0653 EST.   • Monocyte % 11/21/2018 4.0* 5.0 - 12.0 % Corrected    Corrected result. Previous result was 5.0 % on 11/21/2018 at 0653 EST.   • Eosinophil % 11/21/2018 9.0* 0.3 - 6.2 % Corrected    Corrected result. Previous result was 2.0 % on 11/21/2018 at 0653 EST.   • Atypical Lymphocyte % 11/21/2018 2.0  0.0 - 5.0 % Corrected    Corrected result. Previous result was 4.0 % on 11/21/2018 at 0653 EST.   • Neutrophils Absolute 11/21/2018 4.02  1.90 - 8.10 10*3/mm3 Corrected    Corrected result. Previous result was 3.37 10*3/mm3 on 11/21/2018 at 0653 EST.   • Lymphocytes Absolute 11/21/2018 1.49  0.90 - 4.80 10*3/mm3 Corrected    Corrected result. Previous result was 2.40 10*3/mm3 on 11/21/2018 at 0653 EST.   • Monocytes Absolute 11/21/2018 0.26  0.20 - 1.20 10*3/mm3 Corrected    Corrected result. Previous result was 0.32 10*3/mm3 on 11/21/2018 at 0653 EST.   • Eosinophils Absolute 11/21/2018 0.58  0.00 - 0.70 10*3/mm3 Corrected    Corrected result. Previous result was 0.13 10*3/mm3 on 11/21/2018 at 0653 EST.   • nRBC 11/21/2018 1.0* 0.0 - 0.0 /100 WBC Final   • Anisocytosis 11/21/2018 Mod/2+   None Seen Final   • Polychromasia 11/21/2018 Mod/2+  None Seen Final    Appended report. These results have been appended to a previously final verified report.   • WBC Morphology 11/21/2018 Normal  Normal Final   • Clumped Platelets 11/21/2018 Present  None Seen Final    Appended report. These results have been appended to a previously final verified report.   • Glucose 11/21/2018 173* 70 - 130 mg/dL Final   • Uric Acid 11/21/2018 8.8* 2.4 - 5.7 mg/dL Final   • Glucose 11/19/2018 153* 70 - 130 mg/dL Final   • Glucose 11/21/2018 272* 70 - 130 mg/dL Final   • Glucose 11/21/2018 242* 70 - 130 mg/dL Final   • Glucose 11/21/2018 224* 70 - 130 mg/dL Final   • Hemoglobin 11/22/2018 7.6* 11.9 - 15.5 g/dL Final   • Hematocrit 11/22/2018 24.1* 35.6 - 45.5 % Final   • Glucose 11/22/2018 190* 70 - 130 mg/dL Final   • C-Reactive Protein 11/21/2018 1.80* 0.00 - 0.50 mg/dL Final   • Sed Rate 11/22/2018 >140* 0 - 30 mm/hr Final   • Glucose 11/22/2018 328* 70 - 130 mg/dL Final   • Glucose 11/22/2018 246* 70 - 130 mg/dL Final   • Glucose 11/22/2018 277* 70 - 130 mg/dL Final   • Hemoglobin 11/23/2018 7.8* 11.9 - 15.5 g/dL Final   • Hematocrit 11/23/2018 24.8* 35.6 - 45.5 % Final   • Sed Rate 11/23/2018 >140* 0 - 30 mm/hr Final   • C-Reactive Protein 11/23/2018 13.78* 0.00 - 0.50 mg/dL Final   • Glucose 11/23/2018 289* 65 - 99 mg/dL Final   • BUN 11/23/2018 20  8 - 23 mg/dL Final   • Creatinine 11/23/2018 1.82* 0.57 - 1.00 mg/dL Final   • Sodium 11/23/2018 139  136 - 145 mmol/L Final   • Potassium 11/23/2018 4.8  3.5 - 5.2 mmol/L Final   • Chloride 11/23/2018 99  98 - 107 mmol/L Final   • CO2 11/23/2018 29.6* 22.0 - 29.0 mmol/L Final   • Calcium 11/23/2018 8.8  8.6 - 10.5 mg/dL Final   • eGFR Non African Amer 11/23/2018 27* >60 mL/min/1.73 Final   • BUN/Creatinine Ratio 11/23/2018 11.0  7.0 - 25.0 Final   • Anion Gap 11/23/2018 10.4  mmol/L Final   • Glucose 11/23/2018 269* 70 - 130 mg/dL Final       No results found.  Left foot  x-rays reviewed show no acute injuries.  Mild degenerative changes present.    ASSESSMENT:  GI bleed  Left foot pain, poly-articular  Elevated uric acid, possible gout, pseudogout.  Infection possible, though less likely.      Patient Active Problem List   Diagnosis   • Deep vein thrombosis of lower extremity (CMS/HCC)   • Arthritis   • Chronic back pain   • Chronic anemia   • Type 2 diabetes mellitus with neurologic complication (CMS/HCC)   • Brittle diabetes mellitus (CMS/HCC)   • Dysuria   • Hyperlipidemia   • Hypertension   • Insomnia with sleep apnea   • Spasm   • Obstructive sleep apnea syndrome   • Peripheral neuropathy   • Speech disturbance   • Warfarin anticoagulation   • Diabetic gastroparesis (CMS/HCC)   • Primary localized osteoarthrosis of left shoulder region   • Rotator cuff tendonitis   • Acquired hypothyroidism   • Anxiety   • Gastroesophageal reflux disease   • History of biliary T-tube placement   • 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)   • Complex tear of medial meniscus of left knee as current injury   • Insufficiency fracture of tibia   • Primary osteoarthritis of left knee   • Hypoxia   • Low blood pressure   • Hyperkalemia   • Tendinitis of right rotator cuff   • AC joint arthropathy   • Subacromial impingement of right shoulder   • Right carpal tunnel syndrome   • Community acquired pneumonia of left lung   • Gastrointestinal hemorrhage   • Acute blood loss anemia       PLAN:    Since gout is associated with GI bleeds, (decreased blood volume, decreased GFR, increased uric acid reabsorption)  And her uric acid is elevated this is likely the underlying culprit.  I will repeat colchicine to hopefully get the diarrhea response to reduce uric acid level and repeat uric acid levels to follow response.     Pseudogout is a possibility given the migratory nature of presentation.  Will give medrol oral dose pack.  This would also help gout as well.  Will add sliding scale insulin to  help negate the affects of steroid on her diabetes.  Unsure however the steroids with the current GI bleed, so will get OK from Dr. Rogers first.      Infection is also a possibility, though I believe less likely.  Will order a MRI to evaluate for any fluid collections that could be aspirated for definitive diagnosis.      I will follow up tomorrow.  Thanks for consult.     The above diagnosis and treatment plan was discussed with the patient.  They were educated in treatment options for their condition.   They were given the opportunity to ask questions and were answered to their satisfaction.  They agreed to proceed with the above treatment plan.        Luis Angel Hernandez MD  Date: 11/23/2018

## 2018-11-23 NOTE — PROGRESS NOTES
GI Daily Progress Note    Assessment/Plan:      Acute blood loss anemia    Chronic back pain    Chronic anemia    Type 2 diabetes mellitus with neurologic complication (CMS/[facility])    Hyperlipidemia    Hypertension    Obstructive sleep apnea syndrome    CKD stage 3 due to type 2 diabetes mellitus (CMS/[facility])       LOS: 0 days     Joya Singh is a 78 y.o. female who was admitted with Acute blood loss anemia. She reports the symptoms are unchanged. Still with foot pain but walked on it yesterday. HGB stable At 7.8 this AM    Subjective:    Patient expresses Left Foot Pain  Patient denies abdominal pain, vomiting, diarrhea and bloody stools    Objective:    Vital signs in last 24 hours:  Temp:  [98.1 °F (36.7 °C)-99.4 °F (37.4 °C)] 98.3 °F (36.8 °C)  Heart Rate:  [79-87] 79  Resp:  [16-18] 18  BP: (118-139)/(46-70) 130/46    Intake/Output last 3 shifts:  I/O last 3 completed shifts:  In: 1080 [P.O.:1080]  Out: -   Intake/Output this shift:  I/O this shift:  In: 120 [P.O.:120]  Out: -     Physical Exam:Abdomen  Sounds Normal Active Bowel Sounds   Distension Soft   Tenderness Nontender     Imaging Results (last 72 hours)     Procedure Component Value Units Date/Time    XR Foot 3+ View Left [[id number]] Collected:  11/21/18 1318     Updated:  11/21/18 1323    Narrative:       XR FOOT 3+ VW LEFT-     INDICATIONS:    Left foot pain     TECHNIQUE: 3 VIEWS OF THE LEFT FOOT     COMPARISON: None available     FINDINGS:      No acute fracture or erosion is identified. Moderate calcaneal  spurring. Sclerotic focus at the mid calcaneus, although nonspecific,  may represent bone island. No dislocation.  Small arterial calcification  is suggested.       Impression:          No acute fracture or erosion is identified. If there is clinical  suspicion for radiographically occult osteomyelitis or insufficiency  fracture, MRI or bone scan could be considered for further evaluation.     This report was finalized on 11/21/2018 1:20 PM by   Aleks Waters M.D.             WBC   Date Value Ref Range Status   11/21/2018 6.49 4.50 - 10.70 10*3/mm3 Final     RBC   Date Value Ref Range Status   11/21/2018 2.49 (L) 3.90 - 5.20 10*6/mm3 Final     Hemoglobin   Date Value Ref Range Status   11/23/2018 7.8 (L) 11.9 - 15.5 g/dL Final     Hematocrit   Date Value Ref Range Status   11/23/2018 24.8 (L) 35.6 - 45.5 % Final     MCV   Date Value Ref Range Status   11/21/2018 98.8 (H) 80.5 - 98.2 fL Final     MCH   Date Value Ref Range Status   11/21/2018 31.7 26.9 - 32.0 pg Final     MCHC   Date Value Ref Range Status   11/21/2018 32.1 (L) 32.4 - 36.3 g/dL Final     RDW   Date Value Ref Range Status   11/21/2018 18.4 (H) 11.7 - 13.0 % Final     RDW-SD   Date Value Ref Range Status   11/21/2018 63.7 (H) 37.0 - 54.0 fl Final     MPV   Date Value Ref Range Status   11/21/2018 10.8 6.0 - 12.0 fL Final     Platelets   Date Value Ref Range Status   11/21/2018 412 140 - 500 10*3/mm3 Final     Neutrophils Absolute   Date Value Ref Range Status   11/21/2018 4.02 1.90 - 8.10 10*3/mm3 Corrected     Comment:     Corrected result. Previous result was 3.37 10*3/mm3 on 11/21/2018 at 0653 EST.     Eosinophils Absolute   Date Value Ref Range Status   11/21/2018 0.58 0.00 - 0.70 10*3/mm3 Corrected     Comment:     Corrected result. Previous result was 0.13 10*3/mm3 on 11/21/2018 at 0653 EST.     nRBC   Date Value Ref Range Status   11/21/2018 1.0 (H) 0.0 - 0.0 /100 WBC Final       Glucose   Date Value Ref Range Status   11/23/2018 289 (H) 65 - 99 mg/dL Final     Sodium   Date Value Ref Range Status   11/23/2018 139 136 - 145 mmol/L Final     Potassium   Date Value Ref Range Status   11/23/2018 4.8 3.5 - 5.2 mmol/L Final     CO2   Date Value Ref Range Status   11/23/2018 29.6 (H) 22.0 - 29.0 mmol/L Final     Chloride   Date Value Ref Range Status   11/23/2018 99 98 - 107 mmol/L Final     Anion Gap   Date Value Ref Range Status   11/23/2018 10.4 mmol/L Final     Creatinine   Date  Value Ref Range Status   11/23/2018 1.82 (H) 0.57 - 1.00 mg/dL Final     BUN   Date Value Ref Range Status   11/23/2018 20 8 - 23 mg/dL Final     BUN/Creatinine Ratio   Date Value Ref Range Status   11/23/2018 11.0 7.0 - 25.0 Final     Calcium   Date Value Ref Range Status   11/23/2018 8.8 8.6 - 10.5 mg/dL Final     eGFR Non  Amer   Date Value Ref Range Status   11/23/2018 27 (L) >60 mL/min/1.73 Final        Anemia  CKD IV  DM  L Foot Pain ? Gout? Elevated Uric Acid      Overall Stable waiting on Ortho for Opinion.

## 2018-11-23 NOTE — PLAN OF CARE
Problem: Patient Care Overview  Goal: Plan of Care Review  Outcome: Ongoing (interventions implemented as appropriate)   11/23/18 1607   Coping/Psychosocial   Plan of Care Reviewed With patient   Plan of Care Review   Progress improving   OTHER   Outcome Summary incr amb dist; just back from testing and fatigued , so no further amb      11/23/18 1607   Coping/Psychosocial   Plan of Care Reviewed With patient   Plan of Care Review   Progress improving   OTHER   Outcome Summary incr amb dist; just back from testing and fatigued , so no further amb today

## 2018-11-23 NOTE — THERAPY TREATMENT NOTE
Acute Care - Physical Therapy Treatment Note  [facility]     Patient Name: Joya Singh  : 1940  MRN: [id number]  Today's Date: 2018  Onset of Illness/Injury or Date of Surgery: 18  Date of Referral to PT: 18  Referring Physician: Dalton    Admit Date: 2018    Visit Dx:    ICD-10-CM ICD-9-CM   1. Impaired functional mobility and activity tolerance Z74.09 V49.89     Patient Active Problem List   Diagnosis   • Deep vein thrombosis of lower extremity (CMS/HCC)   • Arthritis   • Chronic back pain   • Chronic anemia   • Type 2 diabetes mellitus with neurologic complication (CMS/HCC)   • Brittle diabetes mellitus (CMS/HCC)   • Dysuria   • Hyperlipidemia   • Hypertension   • Insomnia with sleep apnea   • Spasm   • Obstructive sleep apnea syndrome   • Peripheral neuropathy   • Speech disturbance   • Warfarin anticoagulation   • Diabetic gastroparesis (CMS/HCC)   • Primary localized osteoarthrosis of left shoulder region   • Rotator cuff tendonitis   • Acquired hypothyroidism   • Anxiety   • Gastroesophageal reflux disease   • History of biliary T-tube placement   • 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)   • Complex tear of medial meniscus of left knee as current injury   • Insufficiency fracture of tibia   • Primary osteoarthritis of left knee   • Hypoxia   • Low blood pressure   • Hyperkalemia   • Tendinitis of right rotator cuff   • AC joint arthropathy   • Subacromial impingement of right shoulder   • Right carpal tunnel syndrome   • Community acquired pneumonia of left lung   • Gastrointestinal hemorrhage   • Acute blood loss anemia   • Gout attack       Therapy Treatment    Rehabilitation Treatment Summary     Row Name 18 1554             Treatment Time/Intention    Discipline  physical therapy assistant  -SUSANA      Document Type  therapy note (daily note)  -SUSANA      Subjective Information  complains of;weakness;fatigue;pain;dizziness  -SUSANA      Care Plan Review   patient/other agree to care plan  -      Comment  dizziness but denied need for seated rest, x 2 while amb  -JM      Existing Precautions/Restrictions  fall;oxygen therapy device and L/min 2L  -JM      Recorded by [JM] Renae Blair, [facility] 11/23/18 1601      Row Name 11/23/18 1554             Bed Mobility Assessment/Treatment    Supine-Sit [place] (Bed Mobility)  contact guard  -      Bed Mobility, Safety Issues  decreased use of legs for bridging/pushing  -JM      Assistive Device (Bed Mobility)  bed rails  -JM      Recorded by [JM] Renae Blair, PTA 11/23/18 1601      Row Name 11/23/18 1554             Sit-Stand Transfer    Sit-Stand [place] (Transfers)  contact guard;verbal cues  -      Assistive Device (Sit-Stand Transfers)  walker, front-wheeled  -      Recorded by [JM] Renae Blair, [facility] 11/23/18 1601      Row Name 11/23/18 1554             Stand-Sit Transfer    Stand-Sit [place] (Transfers)  supervision;verbal cues  -      Assistive Device (Stand-Sit Transfers)  walker, front-wheeled  -      Recorded by [JM] Renae Blair, [facility] 11/23/18 1601      Row Name 11/23/18 155             Gait/Stairs Assessment/Training    [place] Level (Gait)  contact guard;verbal cues  -      Assistive Device (Gait)  walker, front-wheeled  -      Distance in Feet (Gait)  60  -JM      Deviations/Abnormal Patterns (Gait)  sameera decreased;base of support, wide;stride length decreased  -      Bilateral Gait Deviations  weight shift ability decreased  -      Comment (Gait/Stairs)  corrected step length w/few cues  -      Recorded by [JM] Renae Blair, PTA 11/23/18 1601      Row Name 11/23/18 1550             Positioning and Restraints    Pre-Treatment Position  in bed  -JM      Post Treatment Position  bed  -JM      In Bed  supine;call light within reach;encouraged to call for assist;exit alarm on;notified nsg  -      Recorded by [] Renae Blair, PTA 11/23/18 1601       Row Name 11/23/18 1554             Pain Scale: Numbers Pre/Post-Treatment    Pain Scale: Numbers, Pretreatment  9/10  -      Pain Scale: Numbers, Post-Treatment  10/10  -      Pain Location - Side  Left  -      Pain Location - Orientation  lower  -      Pain Location  extremity  -      Pre/Post Treatment Pain Comment  also pain in head  -      Pain Intervention(s)  Medication (See MAR);Repositioned RN bringing tylenol  -      Recorded by [SUSANA] Renae Blair, [facility] 11/23/18 9771        User Key  (r) = Recorded By, (t) = Taken By, (c) = Cosigned By    Initials Name Effective Dates Discipline    Renae Ricci, [facility] 03/07/18 -  PT                   Physical Therapy Education     Title: PT OT SLP Therapies (Active)     Topic: Physical Therapy (Done)     Point: Mobility training (Done)     Learning Progress Summary           Patient Acceptance, E,TB,D, VU by  at 11/23/2018  4:13 PM    Acceptance, E,TB, VU,DU by  at 11/22/2018  2:45 PM    Acceptance, E,D, NR by  at 11/21/2018  3:07 PM                   Point: Home exercise program (Done)     Learning Progress Summary           Patient Acceptance, E,TB,D, VU by  at 11/23/2018  4:13 PM    Acceptance, E,TB, VU,DU by  at 11/22/2018  2:45 PM    Acceptance, E,D, NR by  at 11/21/2018  3:07 PM                   Point: Body mechanics (Done)     Learning Progress Summary           Patient Acceptance, E,TB,D, VU by  at 11/23/2018  4:13 PM    Acceptance, E,TB, VU,DU by  at 11/22/2018  2:45 PM    Acceptance, E,D, NR by  at 11/21/2018  3:07 PM                   Point: Precautions (Done)     Learning Progress Summary           Patient Acceptance, E,TB,D, VU by  at 11/23/2018  4:13 PM    Acceptance, E,TB, VU,DU by  at 11/22/2018  2:45 PM    Acceptance, E,D, NR by  at 11/21/2018  3:07 PM                               User Key     Initials Effective Dates Name Provider Type Discipline     04/03/18 -  Brook Rees, PT Physical Therapist PT      03/07/18 -  Renae Blair PTA Physical Therapy Assistant PT     03/07/18 -  Michael Patel PTA Physical Therapy Assistant PT                PT Recommendation and Plan     Plan of Care Reviewed With: patient  Progress: improving  Outcome Summary: incr amb dist; just back from testing and fatigued , so no further amb today  Outcome Measures     Row Name 11/23/18 1600 11/22/18 1400 11/21/18 1500       How much help from another person do you currently need...    Turning from your back to your side while in flat bed without using bedrails?  4  -JM  4  -CW  4  -PC    Moving from lying on back to sitting on the side of a flat bed without bedrails?  3  -JM  4  -CW  4  -PC    Moving to and from a bed to a chair (including a wheelchair)?  3  -  3  -CW  3  -PC    Standing up from a chair using your arms (e.g., wheelchair, bedside chair)?  3  -  3  -CW  3  -PC    Climbing 3-5 steps with a railing?  1  -  2  -CW  2  -PC    To walk in hospital room?  3  -  3  -CW  2  -PC    AM-PAC 6 Clicks Score  17  -JM  19  -CW  18  -PC       Functional Assessment    Outcome Measure Options  --  AM-PAC 6 Clicks Basic Mobility (PT)  -CW  AM-PAC 6 Clicks Basic Mobility (PT)  -PC      User Key  (r) = Recorded By, (t) = Taken By, (c) = Cosigned By    Initials Name Provider Type    PC Brook Rees, PT Physical Therapist    Renae Ricci PTA Physical Therapy Assistant     Michael Patel, ALE Physical Therapy Assistant         Time Calculation:   PT Charges     Row Name 11/23/18 1553             Time Calculation    Start Time  1530  -      Stop Time  1546  -      Time Calculation (min)  16 min  -      PT Received On  11/23/18  -      PT - Next Appointment  11/24/18  -         Time Calculation- PT    Total Timed Code Minutes- PT  16 minute(s)  -        User Key  (r) = Recorded By, (t) = Taken By, (c) = Cosigned By    Initials Name Provider Type    Renae Ricci PTA Physical Therapy Assistant         Therapy Suggested Charges     Code   Minutes Charges    None           Therapy Charges for Today     Code Description Service Date Service Provider Modifiers Qty    91322305861 HC PT THER PROC EA 15 MIN 11/23/2018 Renae Blair, PTA GP 1          PT G-Codes  PT Professional Judgement Used?: Yes  Outcome Measure Options: AM-PAC 6 Clicks Basic Mobility (PT)  AM-PAC 6 Clicks Score: 17  Functional Limitation: Mobility: Walking and moving around  Mobility: Walking and Moving Around Current Status (): At least 40 percent but less than 60 percent impaired, limited or restricted  Mobility: Walking and Moving Around Goal Status (): At least 1 percent but less than 20 percent impaired, limited or restricted    Renae Blair PTA  11/23/2018

## 2018-11-23 NOTE — PROGRESS NOTES
Continued Stay Note  [facility]     Patient Name: Joya Singh  MRN: [id number]  Today's Date: 11/23/2018    Admit Date: 11/19/2018    Discharge Plan     Row Name 11/23/18 1158       Plan    Plan  Plan is home with her son and DIL upon DC with [employer] .      Plan Comments Spoke with pt and her son, Isaac ([phone]) who confirm that the plan will be to return home upon DC with [facility].  Isaac states that pt has another son and dtr in law that can be with her during the day if needed.          Discharge Codes    No documentation.             Toña Almonte RN

## 2018-11-23 NOTE — PLAN OF CARE
Problem: Patient Care Overview  Goal: Plan of Care Review  Outcome: Ongoing (interventions implemented as appropriate)   11/23/18 0140   OTHER   Outcome Summary VSS; ortho to see later today for pink, painful toes. Discharge is pending Hgb. Will continue to monitor       Problem: Fall Risk (Adult)  Goal: Identify Related Risk Factors and Signs and Symptoms  Outcome: Outcome(s) achieved Date Met: 11/23/18    Goal: Absence of Fall  Outcome: Ongoing (interventions implemented as appropriate)      Problem: Nausea/Vomiting (Adult)  Goal: Identify Related Risk Factors and Signs and Symptoms  Outcome: Outcome(s) achieved Date Met: 11/23/18    Goal: Symptom Relief  Outcome: Ongoing (interventions implemented as appropriate)    Goal: Adequate Hydration  Outcome: Ongoing (interventions implemented as appropriate)      Problem: Anemia (Adult)  Goal: Identify Related Risk Factors and Signs and Symptoms  Outcome: Ongoing (interventions implemented as appropriate)    Goal: Symptom Improvement  Outcome: Ongoing (interventions implemented as appropriate)

## 2018-11-23 NOTE — PROGRESS NOTES
"   LOS: 0 days   Patient Care Team:  Chari Mercado MD as PCP - General  Chari Mercado MD as PCP - Family Medicine  Christos Rob MD as Surgeon (General Surgery)  German Wylie MD as Surgeon (Orthopedic Surgery)    Chief Complaint/ Reason for encounter: Chronic kidney disease, anemia management        Subjective     Medical history reviewed:  History of Present Illness    Subjective:  Symptoms:  Improved.  No shortness of breath, chest pain or weakness.  (Feeling better, ankle pain improved).    Diet:  Adequate intake.    Activity level: Returning to normal.    Pain:  She complains of pain that is mild.  She reports pain is improving.  Pain is partially controlled.          History taken from: Patient and chart    Objective     Vital Signs  Temp:  [98.2 °F (36.8 °C)-99.4 °F (37.4 °C)] 98.3 °F (36.8 °C)  Heart Rate:  [79-87] 79  Resp:  [16-18] 18  BP: (118-139)/(46-70) 130/46       Wt Readings from Last 1 Encounters:   11/19/18 2238 92 kg (202 lb 14.4 oz)       Objective:  General Appearance:  Comfortable, in no acute distress and not in pain (Obese, chronically ill-appearing).    Vital signs: (most recent): Blood pressure 130/46, pulse 79, temperature 98.3 °F (36.8 °C), temperature source Oral, resp. rate 18, height 157.5 cm (62.01\"), weight 92 kg (202 lb 14.4 oz), SpO2 94 %.  Vital signs are normal.  No fever.    Output: Producing urine.    HEENT: Normal HEENT exam.    Lungs:  Normal effort and normal respiratory rate.  Breath sounds clear to auscultation.  She is not in respiratory distress.  No decreased breath sounds.    Heart: Normal rate.  Regular rhythm.  S1 normal.  No murmur.   Abdomen: Abdomen is soft.  Bowel sounds are normal.   There is no abdominal tenderness.     Extremities: Normal range of motion.    Pulses: Distal pulses are intact.    Neurological: Patient is alert and oriented to person, place and time.    Skin:  Warm and dry.  No rash or cyanosis. "             Results Review:    Intake/Output:     Intake/Output Summary (Last 24 hours) at 11/23/2018 1120  Last data filed at 11/23/2018 0835  Gross per 24 hour   Intake 570 ml   Output --   Net 570 ml         DATA:  Radiology and Labs:  The following labs independently reviewed by me. Additional labs ordered for tomorrow a.m.  Interval notes, chart personally reviewed by me.   Old records independently reviewed showing stage III chronic kidney disease, baseline creatinine 1.7      Labs:   Recent Results (from the past 24 hour(s))   POC Glucose Once    Collection Time: 11/22/18  4:45 PM   Result Value Ref Range    Glucose 246 (H) 70 - 130 mg/dL   POC Glucose Once    Collection Time: 11/22/18  8:53 PM   Result Value Ref Range    Glucose 277 (H) 70 - 130 mg/dL   Hemoglobin & Hematocrit, Blood    Collection Time: 11/23/18  5:47 AM   Result Value Ref Range    Hemoglobin 7.8 (L) 11.9 - 15.5 g/dL    Hematocrit 24.8 (L) 35.6 - 45.5 %   Sedimentation Rate    Collection Time: 11/23/18  5:47 AM   Result Value Ref Range    Sed Rate >140 (H) 0 - 30 mm/hr   C-reactive Protein    Collection Time: 11/23/18  5:47 AM   Result Value Ref Range    C-Reactive Protein 13.78 (H) 0.00 - 0.50 mg/dL   Basic Metabolic Panel    Collection Time: 11/23/18  5:47 AM   Result Value Ref Range    Glucose 289 (H) 65 - 99 mg/dL    BUN 20 8 - 23 mg/dL    Creatinine 1.82 (H) 0.57 - 1.00 mg/dL    Sodium 139 136 - 145 mmol/L    Potassium 4.8 3.5 - 5.2 mmol/L    Chloride 99 98 - 107 mmol/L    CO2 29.6 (H) 22.0 - 29.0 mmol/L    Calcium 8.8 8.6 - 10.5 mg/dL    eGFR Non African Amer 27 (L) >60 mL/min/1.73    BUN/Creatinine Ratio 11.0 7.0 - 25.0    Anion Gap 10.4 mmol/L   POC Glucose Once    Collection Time: 11/23/18  6:37 AM   Result Value Ref Range    Glucose 269 (H) 70 - 130 mg/dL       Radiology:  Imaging Results (last 24 hours)     ** No results found for the last 24 hours. **             Medications have been reviewed:  Current Facility-Administered  Medications   Medication Dose Route Frequency Provider Last Rate Last Dose   • acetaminophen (TYLENOL) tablet 650 mg  650 mg Oral Q4H PRN Izzy Owusu APRN   650 mg at 11/23/18 1054   • atorvastatin (LIPITOR) tablet 10 mg  10 mg Oral Nightly Tigre Hoffman MD   10 mg at 11/22/18 2014   • colchicine tablet 0.6 mg  0.6 mg Oral Q1H Luis Angel Hernandez MD       • colchicine tablet 1.2 mg  1.2 mg Oral Daily Luis Angel Hernandez MD       • cyclobenzaprine (FLEXERIL) tablet 5 mg  5 mg Oral BID PRN Tigre Hoffman MD   5 mg at 11/22/18 0611   • dextrose (D50W) 25 g/ 50mL Intravenous Solution 25 g  25 g Intravenous Q15 Min PRN Izzy Owusu APRN       • dextrose (D50W) 25 g/ 50mL Intravenous Solution 25 g  25 g Intravenous Q15 Min PRN Luis Angel Hernandez MD       • dextrose (GLUTOSE) oral gel 15 g  15 g Oral Q15 Min PRN Izzy Owusu APRN       • dextrose (GLUTOSE) oral gel 15 g  15 g Oral Q15 Min PRN Luis Angel Hernandez MD       • DULoxetine (CYMBALTA) DR capsule 30 mg  30 mg Oral BID Tigre Hoffman MD   30 mg at 11/23/18 0822   • epoetin chu (EPOGEN,PROCRIT) injection 20,000 Units  20,000 Units Subcutaneous Weekly Yasmani Baugh MD   20,000 Units at 11/21/18 1220   • furosemide (LASIX) tablet 20 mg  20 mg Oral Daily Tigre Hoffman MD   20 mg at 11/23/18 0822   • gabapentin (NEURONTIN) capsule 400 mg  400 mg Oral TID Tigre Hoffman MD   400 mg at 11/23/18 0822   • gabapentin (NEURONTIN) capsule 400 mg  400 mg Oral Nightly Tigre Hoffman MD   400 mg at 11/22/18 2014   • glucagon (human recombinant) (GLUCAGEN DIAGNOSTIC) injection 1 mg  1 mg Subcutaneous PRN Izzy Owusu APRN       • glucagon (human recombinant) (GLUCAGEN DIAGNOSTIC) injection 1 mg  1 mg Subcutaneous PRN Luis Angel Hernandez MD       • insulin glargine (LANTUS) injection 35 Units  35 Units Subcutaneous Nightly Tigre Hoffman MD   35 Units at 11/22/18 2108   • insulin lispro (humaLOG) injection 0-7 Units  0-7 Units Subcutaneous  4x Daily With Meals & Nightly Izzy Owusu, APRN   4 Units at 11/23/18 0822   • insulin lispro (humaLOG) injection 0-9 Units  0-9 Units Subcutaneous 4x Daily With Meals & Nightly Luis Angel Hernandez MD       • levothyroxine (SYNTHROID, LEVOTHROID) tablet 88 mcg  88 mcg Oral Q AM Tigre Hoffman MD   88 mcg at 11/23/18 0526   • MethylPREDNISolone (MEDROL (ANIL)) tablet 4 mg  4 mg Oral After Lunch Luis Angel Hernandez MD       • MethylPREDNISolone (MEDROL (ANIL)) tablet 4 mg  4 mg Oral After Dinner Luis Angel Hernandez MD       • [START ON 11/24/2018] MethylPREDNISolone (MEDROL (ANIL)) tablet 4 mg  4 mg Oral TID Around Food Luis Angel Hernandez MD       • [START ON 11/25/2018] MethylPREDNISolone (MEDROL (ANIL)) tablet 4 mg  4 mg Oral 4x Daily Taper Luis Angel Hernandez MD       • [START ON 11/26/2018] MethylPREDNISolone (MEDROL (ANIL)) tablet 4 mg  4 mg Oral TID Around Food Luis Angel Hernandez MD       • [START ON 11/27/2018] MethylPREDNISolone (MEDROL (ANIL)) tablet 4 mg  4 mg Oral Before Breakfast Luis Angel Hernandez MD       • [START ON 11/27/2018] MethylPREDNISolone (MEDROL (ANIL)) tablet 4 mg  4 mg Oral Tonight Luis Angel Hernandez MD       • [START ON 11/28/2018] MethylPREDNISolone (MEDROL (ANIL)) tablet 4 mg  4 mg Oral Before Breakfast Luis Angel Hernandez MD       • MethylPREDNISolone (MEDROL (ANIL)) tablet 8 mg  8 mg Oral Before Breakfast Luis Angel Hernandez MD       • MethylPREDNISolone (MEDROL (ANIL)) tablet 8 mg  8 mg Oral Tonight Luis Angel Hernandez MD       • [START ON 11/24/2018] MethylPREDNISolone (MEDROL (ANIL)) tablet 8 mg  8 mg Oral Tonight Luis Angel Hernandez MD       • nitroglycerin (NITROSTAT) SL tablet 0.4 mg  0.4 mg Sublingual Q5 Min PRN Izzy Owusu, APRN       • ondansetron (ZOFRAN) tablet 4 mg  4 mg Oral Q6H PRN Izzy Owusu, APRN        Or   • ondansetron ODT (ZOFRAN-ODT) disintegrating tablet 4 mg  4 mg Oral Q6H PRN Izzy Owusu APRN        Or   • ondansetron (ZOFRAN) injection 4 mg  4 mg Intravenous Q6H PRN Izzy Owusu APRN   4 mg at 11/22/18  0013   • pantoprazole (PROTONIX) EC tablet 40 mg  40 mg Oral Tigre Sparks MD   40 mg at 11/23/18 0526   • potassium chloride (MICRO-K) CR capsule 10 mEq  10 mEq Oral Tigre Sparks MD   10 mEq at 11/23/18 0526   • sodium chloride 0.9 % flush 1-10 mL  1-10 mL Intravenous PRN Izzy Owusu APRN       • sodium chloride 0.9 % flush 3 mL  3 mL Intravenous Q12H Izzy Owusu APRN   3 mL at 11/23/18 0822       ASSESSMENT:  Chronic kidney disease stage III, fairly stable and near baseline   Acute on chronic anemia, some component of anemia related to chronic kidney disease.  Iron studies appear adequate  Acute blood loss anemia, off Eliquis   Type 2 diabetes mellitus  Hypertension  Obstructive sleep apnea  Obesity           PLAN:    Renal function, volume and electrolytes remain stable today and near baseline  Continue weekly Epogen, needs outpatient Epogen to help stabilize hgb levels and prevent future transfusions.   Urine studies unremarkable, repeat labs in a.m.  Management of gout per orthopedics/primary team, on oral steroids and colchicine  Kidney Lasix and supplemental potassium     Continue to monitor electrolytes and volume closely  Please call with any questions or concerns.         Yasmani Baugh MD   [facility]   Office phone number: [phone]  Answering service phone number: [phone]    11/23/18  11:20 AM      Dictation performed using Dragon dictation software

## 2018-11-24 LAB
CRP SERPL-MCNC: 12.47 MG/DL (ref 0–0.5)
GLUCOSE BLDC GLUCOMTR-MCNC: 353 MG/DL (ref 70–130)
GLUCOSE BLDC GLUCOMTR-MCNC: 364 MG/DL (ref 70–130)
GLUCOSE BLDC GLUCOMTR-MCNC: 426 MG/DL (ref 70–130)
GLUCOSE BLDC GLUCOMTR-MCNC: 443 MG/DL (ref 70–130)
GLUCOSE BLDC GLUCOMTR-MCNC: 454 MG/DL (ref 70–130)
GLUCOSE BLDC GLUCOMTR-MCNC: 472 MG/DL (ref 70–130)
GLUCOSE BLDC GLUCOMTR-MCNC: 485 MG/DL (ref 70–130)
HCT VFR BLD AUTO: 26 % (ref 35.6–45.5)
HGB BLD-MCNC: 8.5 G/DL (ref 11.9–15.5)
URATE SERPL-MCNC: 9 MG/DL (ref 2.4–5.7)

## 2018-11-24 PROCEDURE — 82962 GLUCOSE BLOOD TEST: CPT

## 2018-11-24 PROCEDURE — 63710000001 INSULIN LISPRO (HUMAN) PER 5 UNITS: Performed by: ORTHOPAEDIC SURGERY

## 2018-11-24 PROCEDURE — G0378 HOSPITAL OBSERVATION PER HR: HCPCS

## 2018-11-24 PROCEDURE — 86140 C-REACTIVE PROTEIN: CPT | Performed by: ORTHOPAEDIC SURGERY

## 2018-11-24 PROCEDURE — 85018 HEMOGLOBIN: CPT | Performed by: HOSPITALIST

## 2018-11-24 PROCEDURE — 84550 ASSAY OF BLOOD/URIC ACID: CPT | Performed by: ORTHOPAEDIC SURGERY

## 2018-11-24 PROCEDURE — 96376 TX/PRO/DX INJ SAME DRUG ADON: CPT

## 2018-11-24 PROCEDURE — 97110 THERAPEUTIC EXERCISES: CPT

## 2018-11-24 PROCEDURE — 63710000001 METHYLPREDNISOLONE 4 MG TABLET THERAPY PACK 21 EACH DISP PACK: Performed by: ORTHOPAEDIC SURGERY

## 2018-11-24 PROCEDURE — 85014 HEMATOCRIT: CPT | Performed by: HOSPITALIST

## 2018-11-24 PROCEDURE — 25010000002 ONDANSETRON PER 1 MG: Performed by: NURSE PRACTITIONER

## 2018-11-24 PROCEDURE — 63710000001 INSULIN GLARGINE PER 5 UNITS: Performed by: HOSPITALIST

## 2018-11-24 RX ORDER — INSULIN GLARGINE 100 [IU]/ML
30 INJECTION, SOLUTION SUBCUTANEOUS EVERY 12 HOURS SCHEDULED
Status: DISCONTINUED | OUTPATIENT
Start: 2018-11-24 | End: 2018-11-25 | Stop reason: HOSPADM

## 2018-11-24 RX ADMIN — ACETAMINOPHEN 650 MG: 325 TABLET, FILM COATED ORAL at 21:14

## 2018-11-24 RX ADMIN — DULOXETINE HYDROCHLORIDE 30 MG: 30 CAPSULE, DELAYED RELEASE ORAL at 21:13

## 2018-11-24 RX ADMIN — SODIUM CHLORIDE, PRESERVATIVE FREE 3 ML: 5 INJECTION INTRAVENOUS at 21:14

## 2018-11-24 RX ADMIN — INSULIN GLARGINE 30 UNITS: 100 INJECTION, SOLUTION SUBCUTANEOUS at 21:21

## 2018-11-24 RX ADMIN — POTASSIUM CHLORIDE 10 MEQ: 750 CAPSULE, EXTENDED RELEASE ORAL at 06:49

## 2018-11-24 RX ADMIN — ATORVASTATIN CALCIUM 10 MG: 10 TABLET, FILM COATED ORAL at 21:12

## 2018-11-24 RX ADMIN — METHYLPREDNISOLONE 4 MG: 4 TABLET ORAL at 12:31

## 2018-11-24 RX ADMIN — METHYLPREDNISOLONE 4 MG: 4 TABLET ORAL at 18:00

## 2018-11-24 RX ADMIN — GABAPENTIN 400 MG: 400 CAPSULE ORAL at 21:13

## 2018-11-24 RX ADMIN — INSULIN LISPRO 8 UNITS: 100 INJECTION, SOLUTION INTRAVENOUS; SUBCUTANEOUS at 08:37

## 2018-11-24 RX ADMIN — GABAPENTIN 400 MG: 400 CAPSULE ORAL at 12:28

## 2018-11-24 RX ADMIN — DULOXETINE HYDROCHLORIDE 30 MG: 30 CAPSULE, DELAYED RELEASE ORAL at 08:36

## 2018-11-24 RX ADMIN — INSULIN LISPRO 9 UNITS: 100 INJECTION, SOLUTION INTRAVENOUS; SUBCUTANEOUS at 21:12

## 2018-11-24 RX ADMIN — INSULIN LISPRO 9 UNITS: 100 INJECTION, SOLUTION INTRAVENOUS; SUBCUTANEOUS at 12:29

## 2018-11-24 RX ADMIN — LEVOTHYROXINE SODIUM 88 MCG: 88 TABLET ORAL at 06:49

## 2018-11-24 RX ADMIN — INSULIN GLARGINE 30 UNITS: 100 INJECTION, SOLUTION SUBCUTANEOUS at 12:28

## 2018-11-24 RX ADMIN — SODIUM CHLORIDE, PRESERVATIVE FREE 3 ML: 5 INJECTION INTRAVENOUS at 08:37

## 2018-11-24 RX ADMIN — GABAPENTIN 400 MG: 400 CAPSULE ORAL at 08:36

## 2018-11-24 RX ADMIN — ACETAMINOPHEN 650 MG: 325 TABLET, FILM COATED ORAL at 12:28

## 2018-11-24 RX ADMIN — METHYLPREDNISOLONE 8 MG: 4 TABLET ORAL at 21:13

## 2018-11-24 RX ADMIN — ACETAMINOPHEN 650 MG: 325 TABLET, FILM COATED ORAL at 08:36

## 2018-11-24 RX ADMIN — METHYLPREDNISOLONE 4 MG: 4 TABLET ORAL at 06:49

## 2018-11-24 RX ADMIN — INSULIN LISPRO 9 UNITS: 100 INJECTION, SOLUTION INTRAVENOUS; SUBCUTANEOUS at 18:00

## 2018-11-24 RX ADMIN — FUROSEMIDE 20 MG: 20 TABLET ORAL at 08:36

## 2018-11-24 RX ADMIN — ONDANSETRON 4 MG: 2 INJECTION INTRAMUSCULAR; INTRAVENOUS at 18:01

## 2018-11-24 RX ADMIN — PANTOPRAZOLE SODIUM 40 MG: 40 TABLET, DELAYED RELEASE ORAL at 06:49

## 2018-11-24 NOTE — PROGRESS NOTES
"   LOS: 0 days   Patient Care Team:  Chari Mercado MD as PCP - General  Chari Mercado MD as PCP - Family Medicine  Christos Rob MD as Surgeon (General Surgery)  German Wylie MD as Surgeon (Orthopedic Surgery)    Chief Complaint/ Reason for encounter: Chronic kidney disease, anemia management        Subjective     Medical history reviewed:  History of Present Illness    Subjective:  Symptoms:  Improved.  No shortness of breath, chest pain or weakness.  (Feeling better, ankle pain improved).    Diet:  Adequate intake.    Activity level: Impaired due to pain.    Pain:  She complains of pain that is mild.  She reports pain is improving.  Pain is well controlled.          History taken from: Patient and chart    Objective     Vital Signs  Temp:  [98 °F (36.7 °C)-98.1 °F (36.7 °C)] 98.1 °F (36.7 °C)  Heart Rate:  [71-77] 72  Resp:  [18] 18  BP: (135-138)/(60-76) 137/76       Wt Readings from Last 1 Encounters:   11/19/18 2238 92 kg (202 lb 14.4 oz)       Objective:  General Appearance:  Comfortable, in no acute distress, not in pain and well-appearing (Obese, chronically ill-appearing).    Vital signs: (most recent): Blood pressure 137/76, pulse 72, temperature 98.1 °F (36.7 °C), temperature source Oral, resp. rate 18, height 157.5 cm (62.01\"), weight 92 kg (202 lb 14.4 oz), SpO2 96 %.  Vital signs are normal.  No fever.    Output: Producing urine.    HEENT: Normal HEENT exam.    Lungs:  Normal effort and normal respiratory rate.  Breath sounds clear to auscultation.  She is not in respiratory distress.  No decreased breath sounds.    Heart: Normal rate.  Regular rhythm.  S1 normal.  No murmur.   Abdomen: Abdomen is soft.  Bowel sounds are normal.   There is no abdominal tenderness.     Extremities: Normal range of motion.  There is deformity.  (Left ankle swollen, less tender today)  Pulses: Distal pulses are intact.    Neurological: Patient is alert and oriented to person, place and " time.    Skin:  Warm and dry.  No rash or cyanosis.             Results Review:    Intake/Output:     Intake/Output Summary (Last 24 hours) at 11/24/2018 1342  Last data filed at 11/24/2018 0604  Gross per 24 hour   Intake 620 ml   Output --   Net 620 ml         DATA:  Radiology and Labs:  The following labs independently reviewed by me. Additional labs ordered for tomorrow a.m.  Interval notes, chart personally reviewed by me.   Old records independently reviewed showing stage III chronic kidney disease, baseline creatinine 1.7      Labs:   Recent Results (from the past 24 hour(s))   POC Glucose Once    Collection Time: 11/23/18  4:37 PM   Result Value Ref Range    Glucose 254 (H) 70 - 130 mg/dL   POC Glucose Once    Collection Time: 11/23/18  9:03 PM   Result Value Ref Range    Glucose 356 (H) 70 - 130 mg/dL   POC Glucose Once    Collection Time: 11/24/18  2:23 AM   Result Value Ref Range    Glucose 353 (H) 70 - 130 mg/dL   Uric Acid    Collection Time: 11/24/18  4:29 AM   Result Value Ref Range    Uric Acid 9.0 (H) 2.4 - 5.7 mg/dL   C-reactive Protein    Collection Time: 11/24/18  4:29 AM   Result Value Ref Range    C-Reactive Protein 12.47 (H) 0.00 - 0.50 mg/dL   Hemoglobin & Hematocrit, Blood    Collection Time: 11/24/18  4:29 AM   Result Value Ref Range    Hemoglobin 8.5 (L) 11.9 - 15.5 g/dL    Hematocrit 26.0 (L) 35.6 - 45.5 %   POC Glucose Once    Collection Time: 11/24/18  6:16 AM   Result Value Ref Range    Glucose 364 (H) 70 - 130 mg/dL   POC Glucose Once    Collection Time: 11/24/18 11:08 AM   Result Value Ref Range    Glucose 443 (H) 70 - 130 mg/dL   POC Glucose Once    Collection Time: 11/24/18 11:10 AM   Result Value Ref Range    Glucose 454 (C) 70 - 130 mg/dL       Radiology:  Imaging Results (last 24 hours)     Procedure Component Value Units Date/Time    MRI Foot Left Without Contrast [[id number]] Collected:  11/23/18 1606     Updated:  11/23/18 1623    Narrative:       MRI LEFT MIDFOOT AND FOREFOOT  WITHOUT CONTRAST     HISTORY: Left foot pain that goes from the toes to the mid foot for  several days.     TECHNIQUE: MRI of the left midfoot and forefoot includes short axis  coronal T1, T2 fat-sat as was axial T1, STIR and sagittal PD fat  saturated sequences.     COMPARISON: X-rays of the left foot 11/21/2018.     FINDINGS: There is arthritis at the articulation between the medial and  middle cuneiforms. Degenerative subchondral cysts are present within the  lateral aspect of the medial cuneiform. 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 Midfoot alignment  appears within normal limits. No fracture plane is evident.     There is a mild hallux valgus deformity and there are mild degenerative  changes of the 1st MTP joint. Flexor and extensor tendons appear intact.  Sesamoid marrow signal appears normal. There is generalized edema within  the dorsal midfoot and forefoot subcutaneous fat.. There is mild  generalized midfoot and forefoot muscular atrophy.       Impression:       1. No evidence for fracture or acute abnormality.  2. 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  3. Mild hallux valgus deformity.  4. Edema within the dorsal midfoot and forefoot subcutaneous fat.  Generalized muscular atrophy.     This report was finalized on 11/23/2018 4:20 PM by Dr. Gigi Lee M.D.                Medications have been reviewed:  Current Facility-Administered Medications   Medication Dose Route Frequency Provider Last Rate Last Dose   • acetaminophen (TYLENOL) tablet 650 mg  650 mg Oral Q4H PRN Izzy Owusu APRN   650 mg at 11/24/18 1228   • atorvastatin (LIPITOR) tablet 10 mg  10 mg Oral Nightly Tigre Hoffman MD   10 mg at 11/23/18 2118   • cyclobenzaprine (FLEXERIL) tablet 5 mg  5 mg Oral BID PRN Tigre Hoffman MD    5 mg at 11/22/18 0611   • dextrose (D50W) 25 g/ 50mL Intravenous Solution 25 g  25 g Intravenous Q15 Min PRN Izzy Owusu APRN       • dextrose (D50W) 25 g/ 50mL Intravenous Solution 25 g  25 g Intravenous Q15 Min PRN Luis Angel Hernandez MD       • dextrose (GLUTOSE) oral gel 15 g  15 g Oral Q15 Min PRN Izzy Owusu APRN       • dextrose (GLUTOSE) oral gel 15 g  15 g Oral Q15 Min PRN Luis Angel Hernandez MD       • DULoxetine (CYMBALTA) DR capsule 30 mg  30 mg Oral BID Tigre Hoffman MD   30 mg at 11/24/18 0836   • epoetin chu (EPOGEN,PROCRIT) injection 20,000 Units  20,000 Units Subcutaneous Weekly Yasmani Baugh MD   20,000 Units at 11/21/18 1220   • furosemide (LASIX) tablet 20 mg  20 mg Oral Daily Tigre Hoffman MD   20 mg at 11/24/18 0836   • gabapentin (NEURONTIN) capsule 400 mg  400 mg Oral TID Tigre Hoffman MD   400 mg at 11/24/18 1228   • gabapentin (NEURONTIN) capsule 400 mg  400 mg Oral Nightly Tigre Hoffman MD   400 mg at 11/23/18 2117   • glucagon (human recombinant) (GLUCAGEN DIAGNOSTIC) injection 1 mg  1 mg Subcutaneous PRN Izzy Owusu, APRN       • glucagon (human recombinant) (GLUCAGEN DIAGNOSTIC) injection 1 mg  1 mg Subcutaneous PRN Luis Angel Hernandez MD       • insulin glargine (LANTUS) injection 30 Units  30 Units Subcutaneous Q12H Tigre Hoffman MD   30 Units at 11/24/18 1228   • insulin lispro (humaLOG) injection 0-9 Units  0-9 Units Subcutaneous 4x Daily With Meals & Nightly Luis Angel Hernandez MD   9 Units at 11/24/18 1229   • levothyroxine (SYNTHROID, LEVOTHROID) tablet 88 mcg  88 mcg Oral Q AM Tigre Hoffman MD   88 mcg at 11/24/18 0649   • MethylPREDNISolone (MEDROL (ANIL)) tablet 4 mg  4 mg Oral TID Around Food Luis Angel Hernandez MD   4 mg at 11/24/18 1231   • [START ON 11/25/2018] MethylPREDNISolone (MEDROL (ANIL)) tablet 4 mg  4 mg Oral 4x Daily Taper Luis Angel Hernandez MD       • [START ON 11/26/2018] MethylPREDNISolone (MEDROL (ANIL)) tablet 4 mg  4 mg Oral  TID Around Food Luis Angel Hernandez MD       • [START ON 11/27/2018] MethylPREDNISolone (MEDROL (ANIL)) tablet 4 mg  4 mg Oral Before Breakfast Luis Angel Hernandez MD       • [START ON 11/27/2018] MethylPREDNISolone (MEDROL (ANIL)) tablet 4 mg  4 mg Oral Luis Angel Walsh MD       • [START ON 11/28/2018] MethylPREDNISolone (MEDROL (ANIL)) tablet 4 mg  4 mg Oral Before Breakfast Luis Angel Hernandez MD       • MethylPREDNISolone (MEDROL (ANIL)) tablet 8 mg  8 mg Oral Luis Angel Walsh MD       • nitroglycerin (NITROSTAT) SL tablet 0.4 mg  0.4 mg Sublingual Q5 Min PRN Izzy Owusu APRN       • ondansetron (ZOFRAN) tablet 4 mg  4 mg Oral Q6H PRN Izzy Owusu APRFRANCIS        Or   • ondansetron ODT (ZOFRAN-ODT) disintegrating tablet 4 mg  4 mg Oral Q6H PRN Izzy Owusu APRFRANCIS        Or   • ondansetron (ZOFRAN) injection 4 mg  4 mg Intravenous Q6H PRN Izzy Owusu APRN   4 mg at 11/23/18 2138   • pantoprazole (PROTONIX) EC tablet 40 mg  40 mg Oral Tigre Sparks MD   40 mg at 11/24/18 0649   • potassium chloride (MICRO-K) CR capsule 10 mEq  10 mEq Oral Tigre Sparks MD   10 mEq at 11/24/18 0649   • sodium chloride 0.9 % flush 1-10 mL  1-10 mL Intravenous PRN Izzy Owusu APRN       • sodium chloride 0.9 % flush 3 mL  3 mL Intravenous Q12H Izzy Owusu APRN   3 mL at 11/24/18 0837       ASSESSMENT:  Chronic kidney disease stage III, fairly stable and near baseline   Acute on chronic anemia, some component of anemia related to chronic kidney disease.  Iron studies adequate  Acute blood loss anemia, off Eliquis   Type 2 diabetes mellitus  Hypertension  Obstructive sleep apnea  Obesity           PLAN:    Renal function, volume and electrolytes have been stable, near baseline   Continue weekly Epogen,  outpatient Epogen to help stabilize hgb levels and prevent future transfusions.  Management of gout per orthopedics/primary team, on oral steroids and colchicine  Continue Lasix and supplemental  potassium  Hgb is improved to 8.5 this morning     Continue to monitor electrolytes and volume closely  Please call with any questions or concerns.         Yasmani Baugh MD   [facility]   Office phone number: [phone]  Answering service phone number: [phone]    11/24/18  1:42 PM      Dictation performed using Dragon dictation software

## 2018-11-24 NOTE — PLAN OF CARE
Problem: Patient Care Overview  Goal: Plan of Care Review  Mild foot pain when oob but not severe.  Min assist for gait with  feet with O@.  Up in chair

## 2018-11-24 NOTE — THERAPY TREATMENT NOTE
Acute Care - Physical Therapy Treatment Note  [facility]     Patient Name: Joya Singh  : 1940  MRN: [id number]  Today's Date: 2018  Onset of Illness/Injury or Date of Surgery: 18  Date of Referral to PT: 18  Referring Physician: Dalton    Admit Date: 2018    Visit Dx:    ICD-10-CM ICD-9-CM   1. Impaired functional mobility and activity tolerance Z74.09 V49.89     Patient Active Problem List   Diagnosis   • Deep vein thrombosis of lower extremity (CMS/HCC)   • Arthritis   • Chronic back pain   • Chronic anemia   • Type 2 diabetes mellitus with neurologic complication (CMS/HCC)   • Brittle diabetes mellitus (CMS/HCC)   • Dysuria   • Hyperlipidemia   • Hypertension   • Insomnia with sleep apnea   • Spasm   • Obstructive sleep apnea syndrome   • Peripheral neuropathy   • Speech disturbance   • Warfarin anticoagulation   • Diabetic gastroparesis (CMS/HCC)   • Primary localized osteoarthrosis of left shoulder region   • Rotator cuff tendonitis   • Acquired hypothyroidism   • Anxiety   • Gastroesophageal reflux disease   • History of biliary T-tube placement   • 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)   • Complex tear of medial meniscus of left knee as current injury   • Insufficiency fracture of tibia   • Primary osteoarthritis of left knee   • Hypoxia   • Low blood pressure   • Hyperkalemia   • Tendinitis of right rotator cuff   • AC joint arthropathy   • Subacromial impingement of right shoulder   • Right carpal tunnel syndrome   • Community acquired pneumonia of left lung   • Gastrointestinal hemorrhage   • Acute blood loss anemia   • Gout attack       Therapy Treatment    Rehabilitation Treatment Summary     Row Name 18 1000             Treatment Time/Intention    Discipline  physical therapy assistant  -SI      Document Type  therapy note (daily note)  -SI      Subjective Information  complains of;dizziness mild dizziness  -SI      Therapy Frequency (PT Clinical  Impression)  daily  -SI      Patient Effort  good  -SI      Comment  -- son in room and janineh say he assist her at home.  -SI      Recorded by [SI] Anna Park, PTA 11/24/18 1019      Row Name 11/24/18 1000             Vital Signs    O2 Delivery Pre Treatment  supplemental O2  -SI      Intra SpO2 (%)  95  -SI      O2 Delivery Intra Treatment  supplemental O2  -SI      Post SpO2 (%)  95  -SI      O2 Delivery Post Treatment  supplemental O2  -SI      Recorded by [SI] Anna Park, PTA 11/24/18 1019      Row Name 11/24/18 1000             Cognitive Assessment/Intervention- PT/OT    Orientation Status (Cognition)  oriented x 4  -SI      Recorded by [SI] Anna Park, PTA 11/24/18 1019      Row Name 11/24/18 1000             Cognitive Assessment Intervention- SLP    Cognitive Function (Cognition)  WNL  -SI      Recorded by [SI] Anna Park, PTA 11/24/18 1019      Row Name 11/24/18 1000             Bed Mobility Assessment/Treatment    Supine-Sit Ness (Bed Mobility)  contact guard  -SI      Recorded by [SI] Anna Park, PTA 11/24/18 1019      Row Name 11/24/18 1000             Stand-Sit Transfer    Stand-Sit Ness (Transfers)  supervision;verbal cues  -SI      Assistive Device (Stand-Sit Transfers)  walker, front-wheeled  -SI      Recorded by [SI] Anna Park, PTA 11/24/18 1019      Row Name 11/24/18 1000             Gait/Stairs Assessment/Training    Ness Level (Gait)  contact guard  -SI      Assistive Device (Gait)  walker, front-wheeled  -SI      Distance in Feet (Gait)  150  -SI      Pattern (Gait)  step-through  -SI      Deviations/Abnormal Patterns (Gait)  stride length decreased slow gait  -SI      Comment (Gait/Stairs)  mild dizziness but tolerated  -SI      Recorded by [SI] Anna Park, PTA 11/24/18 1019      Row Name 11/24/18 1000             Static Sitting Balance    Level of Ness (Unsupported Sitting, Static Balance)  supervision  -SI      Recorded by [SI]  Anna Park, PTA 11/24/18 1019      Row Name 11/24/18 1000             Static Standing Balance    Level of [place] (Supported Standing, Static Balance)  contact guard assist  -SI      Assistive Device Utilized (Supported Standing, Static Balance)  rolling walker  -SI      Recorded by [SI] Anna Park, PTA 11/24/18 1019      Row Name 11/24/18 1000             Positioning and Restraints    Pre-Treatment Position  in bed  -SI      Post Treatment Position  chair  -SI      In Chair  reclined;sitting;call light within reach;with family/caregiver  -SI      Recorded by [SI] Anna Park, PTA 11/24/18 1019      Row Name 11/24/18 1000             Pain Scale: Numbers Pre/Post-Treatment    Pain Scale: Numbers, Pretreatment  1/10  -SI      Pain Scale: Numbers, Post-Treatment  2/10  -SI      Pain Location - Side  Left  -SI      Pain Location - Orientation  -- foot  -SI      Recorded by [SI] Anna Park, [facility] 11/24/18 1019        User Key  (r) = Recorded By, (t) = Taken By, (c) = Cosigned By    Initials Name Effective Dates Discipline    SI Anna Park, [facility] 05/18/15 -  PT                   Physical Therapy Education     Title: PT OT SLP Therapies (Active)     Topic: Physical Therapy (Done)     Point: Mobility training (Done)     Learning Progress Summary           Patient Acceptance, E,TB, VU by KAILEY at 11/24/2018 10:19 AM    Comment:  Pt and sone used to her level of assistance at home and discussed.  RWX, and WC when oob in community    Acceptance, E,TB,D, VU by SUSANA at 11/23/2018  4:13 PM    Acceptance, E,TB, VU,DU by MIGDALIA at 11/22/2018  2:45 PM    Acceptance, E,D, NR by CHRISTIANO at 11/21/2018  3:07 PM                   Point: Home exercise program (Done)     Learning Progress Summary           Patient Acceptance, E,TB, VU by KAILEY at 11/24/2018 10:19 AM    Comment:  Pt and sone used to her level of assistance at home and discussed.  RWX, and WC when oob in community    Acceptance, E,TB,D, VU by SUSANA at 11/23/2018  4:13  PM    Acceptance, E,TB, VU,DU by CW at 11/22/2018  2:45 PM    Acceptance, E,D, NR by PC at 11/21/2018  3:07 PM                   Point: Body mechanics (Done)     Learning Progress Summary           Patient Acceptance, E,TB, VU by SI at 11/24/2018 10:19 AM    Comment:  Pt and sonharish used to her level of assistance at home and discussed.  RWX, and WC when oob in community    Acceptance, E,TB,D, VU by  at 11/23/2018  4:13 PM    Acceptance, E,TB, VU,DU by CW at 11/22/2018  2:45 PM    Acceptance, E,D, NR by PC at 11/21/2018  3:07 PM                   Point: Precautions (Done)     Learning Progress Summary           Patient Acceptance, E,TB, VU by  at 11/24/2018 10:19 AM    Comment:  Pt and sonharish used to her level of assistance at home and discussed.  RWX, and WC when oob in community    Acceptance, E,TB,D, VU by  at 11/23/2018  4:13 PM    Acceptance, E,TB, VU,DU by CW at 11/22/2018  2:45 PM    Acceptance, E,D, NR by PC at 11/21/2018  3:07 PM                               User Key     Initials Effective Dates Name Provider Type Discipline     05/18/15 -  Anna Park, PTA Physical Therapy Assistant PT    PC 04/03/18 -  Brook Rees, PT Physical Therapist PT     03/07/18 -  Renae Blair PTA Physical Therapy Assistant PT     03/07/18 -  Michael Patel, PTA Physical Therapy Assistant PT                PT Recommendation and Plan  Therapy Frequency (PT Clinical Impression): daily  Plan of Care Reviewed With: patient, son  Outcome Measures     Row Name 11/24/18 1000 11/23/18 1600 11/22/18 1400       How much help from another person do you currently need...    Turning from your back to your side while in flat bed without using bedrails?  4  -SI  4  -JM  4  -CW    Moving from lying on back to sitting on the side of a flat bed without bedrails?  4  -SI  3  -JM  4  -CW    Moving to and from a bed to a chair (including a wheelchair)?  3  -SI  3  -JM  3  -CW    Standing up from a chair using your arms (e.g.,  wheelchair, bedside chair)?  3  -SI  3  -JM  3  -CW    Climbing 3-5 steps with a railing?  2  -SI  1  -JM  2  -CW    To walk in hospital room?  3  -SI  3  -JM  3  -CW    AM-PAC 6 Clicks Score  19  -SI  17  -JM  19  -CW       Functional Assessment    Outcome Measure Options  AM-PAC 6 Clicks Basic Mobility (PT)  -SI  --  AM-PAC 6 Clicks Basic Mobility (PT)  -CW    Row Name 11/21/18 1500             How much help from another person do you currently need...    Turning from your back to your side while in flat bed without using bedrails?  4  -PC      Moving from lying on back to sitting on the side of a flat bed without bedrails?  4  -PC      Moving to and from a bed to a chair (including a wheelchair)?  3  -PC      Standing up from a chair using your arms (e.g., wheelchair, bedside chair)?  3  -PC      Climbing 3-5 steps with a railing?  2  -PC      To walk in hospital room?  2  -PC      AM-PAC 6 Clicks Score  18  -PC         Functional Assessment    Outcome Measure Options  AM-PAC 6 Clicks Basic Mobility (PT)  -PC        User Key  (r) = Recorded By, (t) = Taken By, (c) = Cosigned By    Initials Name Provider Type    Anna Cheng, PTA Physical Therapy Assistant    PC Brook Rees, PT Physical Therapist    Renae Ricci, PTA Physical Therapy Assistant    CW Michael Patel, PTA Physical Therapy Assistant         Time Calculation:   PT Charges     Row Name 11/24/18 1021             Time Calculation    Start Time  0945  -SI      Stop Time  1000  -SI      Time Calculation (min)  15 min  -SI      PT Received On  11/24/18  -SI      PT - Next Appointment  11/25/18  -SI         Time Calculation- PT    Total Timed Code Minutes- PT  15 minute(s)  -SI        User Key  (r) = Recorded By, (t) = Taken By, (c) = Cosigned By    Initials Name Provider Type    Anna Cheng, ALE Physical Therapy Assistant        Therapy Suggested Charges     Code   Minutes Charges    None           Therapy Charges for Today      Code Description Service Date Service Provider Modifiers Qty    [id number] HC PT THER PROC EA 15 MIN 11/24/2018 Anna Park, PTA GP 1          PT G-Codes  PT Professional Judgement Used?: Yes  Outcome Measure Options: AM-PAC 6 Clicks Basic Mobility (PT)  AM-PAC 6 Clicks Score: 19  Functional Limitation: Mobility: Walking and moving around  Mobility: Walking and Moving Around Current Status (): At least 40 percent but less than 60 percent impaired, limited or restricted  Mobility: Walking and Moving Around Goal Status (): At least 1 percent but less than 20 percent impaired, limited or restricted    Anna Park PTA  11/24/2018

## 2018-11-24 NOTE — PLAN OF CARE
Problem: Patient Care Overview  Goal: Plan of Care Review  Outcome: Ongoing (interventions implemented as appropriate)   11/24/18 0426   Coping/Psychosocial   Plan of Care Reviewed With patient   Plan of Care Review   Progress improving   OTHER   Outcome Summary complains of pain in left foot also nausea no emesis medicated for both with relief        Problem: Anemia (Adult)  Goal: Identify Related Risk Factors and Signs and Symptoms  Outcome: Ongoing (interventions implemented as appropriate)   11/24/18 0426   Anemia (Adult)   Related Risk Factors (Anemia) chronic illness   Signs and Symptoms (Anemia) syncope/near syncope;pallor;fatigue       Problem: Skin Injury Risk (Adult)  Goal: Identify Related Risk Factors and Signs and Symptoms   11/24/18 0426   Skin Injury Risk (Adult)   Related Risk Factors (Skin Injury Risk) advanced age;edema;mobility impaired

## 2018-11-24 NOTE — PROGRESS NOTES
"    DAILY PROGRESS NOTE  [facility]    Patient Identification:  Name: Joya Singh  Age: 78 y.o.  Sex: female  :  1940  MRN: [id number]         Primary Care Physician: Chari Mercado MD    Subjective:  Interval History: feeling better - no bleeding - foot pain improving - denies cp/sob    Objective:son at bedside - had to  for 10m as to why I'm not comfy w/ DC today     Scheduled Meds:  atorvastatin 10 mg Oral Nightly   DULoxetine 30 mg Oral BID   epoetin chu 20,000 Units Subcutaneous Weekly   furosemide 20 mg Oral Daily   gabapentin 400 mg Oral TID   gabapentin 400 mg Oral Nightly   insulin glargine 30 Units Subcutaneous Q12H   insulin lispro 0-9 Units Subcutaneous 4x Daily With Meals & Nightly   levothyroxine 88 mcg Oral Q AM   MethylPREDNISolone 4 mg Oral TID Around Food   [START ON 2018] MethylPREDNISolone 4 mg Oral 4x Daily Taper   [START ON 2018] MethylPREDNISolone 4 mg Oral TID Around Food   [START ON 2018] MethylPREDNISolone 4 mg Oral Before Breakfast   [START ON 2018] MethylPREDNISolone 4 mg Oral Tonight   [START ON 2018] MethylPREDNISolone 4 mg Oral Before Breakfast   MethylPREDNISolone 8 mg Oral Tonight   pantoprazole 40 mg Oral QAM   potassium chloride 10 mEq Oral QAM   sodium chloride 3 mL Intravenous Q12H     Continuous Infusions:     Vital signs in last 24 hours:  Temp:  [98 °F (36.7 °C)-98.1 °F (36.7 °C)] 98.1 °F (36.7 °C)  Heart Rate:  [71-77] 72  Resp:  [18] 18  BP: (135-138)/(60-76) 137/76    Intake/Output:    Intake/Output Summary (Last 24 hours) at 2018 1215  Last data filed at 2018 0604  Gross per 24 hour   Intake 620 ml   Output --   Net 620 ml       Exam:  /76 (BP Location: Left arm, Patient Position: Sitting)   Pulse 72   Temp 98.1 °F (36.7 °C) (Oral)   Resp 18   Ht 157.5 cm (62.01\")   Wt 92 kg (202 lb 14.4 oz)   SpO2 96%   BMI 37.10 kg/m²     General Appearance:    Alert, cooperative, no distress, " AAOx3                         Throat:   Lips, tongue, gums normal; oral mucosa pink and moist                           Neck:   No JVD                         Lungs:    Clear to auscultation bilaterally, respirations unlabored                          Heart:    Regular rate and rhythm, S1 and S2 normal                  Abdomen:     Soft, non-tender, bowel sounds active                 Extremities:   Second toe on left foot erythematous with no streaking of the foot and to be honest no tenderness to palpation of that second toe                        Pulses:   Pulses palpable in lower extremities                  Neurologic:   CNII-XII intact, moving all      Data Review:  Labs in chart were reviewed.    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       Plan:  Anemia - GI plans for outpatient capsule               -Hgb holding at 7.9x2-7.6-7.8-8.5 today - Hgb finally stabilizing              -Renal dosing Epogen                     CKD3 - stable      DM2 - A1c 7.43 - significant reactive hyperglycemia w/ bs nearing 500 despite increasing nightly basal Lantus - at this point, give stat Lantus at 30u and increase temporarily to BID and add scheduled Humalog at 8u plus ssi. This patient will require aggressive taper of insulins as medrol dose pack tapers        HTN/HLD      Left ankle/foot pain - uric mildly elevated   - xray neg for fracture - MRI w/out fluid collection    -PT following   -Trial colchicine and steroids added per Ortho  -ESR/CRP quite elevated      Dispo - hoping for tomorrow with HH - hold DC given aggressive increase today in insulin dosing         Tigre Hoffman MD  11/24/2018  12:15 PM

## 2018-11-24 NOTE — PROGRESS NOTES
DATE OF ADMISSION: 11/19/2018 10:31 PM     LOS: 0 days     Subjective :   Foot feels better.  Able to walk on it yesterday.    Objective :    Vital signs in last 24 hours:  Vitals:    11/23/18 1130 11/23/18 1520 11/23/18 1920 11/23/18 2319   BP: 126/56 136/67 137/67 138/70   BP Location: Right arm Right arm Left arm Left arm   Patient Position: Lying Lying Lying Lying   Pulse: 70  77 75   Resp: 18 18 18 18   Temp: 98 °F (36.7 °C) 98 °F (36.7 °C) 98 °F (36.7 °C) 98.1 °F (36.7 °C)   TempSrc: Oral Oral Oral Oral   SpO2: 93%  95% 94%   Weight:       Height:         Body mass index is 37.1 kg/m².    PHYSICAL EXAM:  Patient is calm, in no acute distress, awake and oriented x 3.  Skin on left foot is clean, dry and intact.  Erythema is improved.  Less tender to palpation.  Swelling is appropriate in amount.  Homans test is negative.  Patient is neurovascularly intact distally.    LABS:  Results from last 7 days   Lab Units  11/24/18   0429   11/21/18   0440   WBC 10*3/mm3   --    --   6.49   HEMOGLOBIN g/dL  8.5*   < >  7.9*   HEMATOCRIT %  26.0*   < >  24.6*   PLATELETS 10*3/mm3   --    --   412    < > = values in this interval not displayed.     Results from last 7 days   Lab Units  11/23/18   0547   SODIUM mmol/L  139   POTASSIUM mmol/L  4.8   CHLORIDE mmol/L  99   CO2 mmol/L  29.6*   BUN mg/dL  20   CREATININE mg/dL  1.82*   GLUCOSE mg/dL  289*   CALCIUM mg/dL  8.8     Results from last 7 days   Lab Units  11/19/18   1807   INR   1.32*     MRI left foot: No fluid collection, OA midfoot.  Mild soft tissue swelling dorsum of foot.  No fracture.    ASSESSMENT:  Left foot synovitis, possible gout vs pseudogout.    Plan:  No fluid to aspirate to obtain diagnosis of gout vs pseudogout.  Would finish medrol dose pack. Follow up with PCP to follow uric acid level.  Follow-up prn with me.  Ok to d/c home from my standpoint.    Luis Angel Hernandez MD    Date: 11/24/2018  Time: 7:17 AM

## 2018-11-25 VITALS
WEIGHT: 202.9 LBS | HEIGHT: 62 IN | BODY MASS INDEX: 37.34 KG/M2 | SYSTOLIC BLOOD PRESSURE: 134 MMHG | OXYGEN SATURATION: 98 % | HEART RATE: 67 BPM | RESPIRATION RATE: 18 BRPM | TEMPERATURE: 98 F | DIASTOLIC BLOOD PRESSURE: 57 MMHG

## 2018-11-25 LAB
ANION GAP SERPL CALCULATED.3IONS-SCNC: 12 MMOL/L
BUN BLD-MCNC: 32 MG/DL (ref 8–23)
BUN/CREAT SERPL: 19.5 (ref 7–25)
CALCIUM SPEC-SCNC: 9.5 MG/DL (ref 8.6–10.5)
CHLORIDE SERPL-SCNC: 99 MMOL/L (ref 98–107)
CO2 SERPL-SCNC: 30 MMOL/L (ref 22–29)
CREAT BLD-MCNC: 1.64 MG/DL (ref 0.57–1)
GFR SERPL CREATININE-BSD FRML MDRD: 30 ML/MIN/1.73
GLUCOSE BLD-MCNC: 230 MG/DL (ref 65–99)
GLUCOSE BLDC GLUCOMTR-MCNC: 254 MG/DL (ref 70–130)
GLUCOSE BLDC GLUCOMTR-MCNC: 423 MG/DL (ref 70–130)
GLUCOSE BLDC GLUCOMTR-MCNC: 428 MG/DL (ref 70–130)
POTASSIUM BLD-SCNC: 5 MMOL/L (ref 3.5–5.2)
SODIUM BLD-SCNC: 141 MMOL/L (ref 136–145)

## 2018-11-25 PROCEDURE — 63710000001 METHYLPREDNISOLONE 4 MG TABLET THERAPY PACK 21 EACH DISP PACK: Performed by: ORTHOPAEDIC SURGERY

## 2018-11-25 PROCEDURE — 63710000001 INSULIN GLARGINE PER 5 UNITS: Performed by: HOSPITALIST

## 2018-11-25 PROCEDURE — 80048 BASIC METABOLIC PNL TOTAL CA: CPT | Performed by: INTERNAL MEDICINE

## 2018-11-25 PROCEDURE — 82962 GLUCOSE BLOOD TEST: CPT

## 2018-11-25 PROCEDURE — 63710000001 INSULIN LISPRO (HUMAN) PER 5 UNITS: Performed by: ORTHOPAEDIC SURGERY

## 2018-11-25 PROCEDURE — G0378 HOSPITAL OBSERVATION PER HR: HCPCS

## 2018-11-25 PROCEDURE — 63710000001 INSULIN LISPRO (HUMAN) PER 5 UNITS: Performed by: INTERNAL MEDICINE

## 2018-11-25 PROCEDURE — 97110 THERAPEUTIC EXERCISES: CPT

## 2018-11-25 RX ORDER — METHYLPREDNISOLONE 4 MG/1
TABLET ORAL
Qty: 21 EACH | Refills: 0 | Status: SHIPPED | OUTPATIENT
Start: 2018-11-25 | End: 2018-12-10

## 2018-11-25 RX ADMIN — METHYLPREDNISOLONE 4 MG: 4 TABLET ORAL at 12:14

## 2018-11-25 RX ADMIN — SODIUM CHLORIDE, PRESERVATIVE FREE 3 ML: 5 INJECTION INTRAVENOUS at 09:25

## 2018-11-25 RX ADMIN — INSULIN LISPRO 6 UNITS: 100 INJECTION, SOLUTION INTRAVENOUS; SUBCUTANEOUS at 09:24

## 2018-11-25 RX ADMIN — GABAPENTIN 400 MG: 400 CAPSULE ORAL at 09:24

## 2018-11-25 RX ADMIN — INSULIN GLARGINE 30 UNITS: 100 INJECTION, SOLUTION SUBCUTANEOUS at 09:24

## 2018-11-25 RX ADMIN — METHYLPREDNISOLONE 4 MG: 4 TABLET ORAL at 06:44

## 2018-11-25 RX ADMIN — GABAPENTIN 400 MG: 400 CAPSULE ORAL at 12:14

## 2018-11-25 RX ADMIN — FUROSEMIDE 20 MG: 20 TABLET ORAL at 09:23

## 2018-11-25 RX ADMIN — INSULIN LISPRO 24 UNITS: 100 INJECTION, SOLUTION INTRAVENOUS; SUBCUTANEOUS at 12:16

## 2018-11-25 RX ADMIN — PANTOPRAZOLE SODIUM 40 MG: 40 TABLET, DELAYED RELEASE ORAL at 06:44

## 2018-11-25 RX ADMIN — ACETAMINOPHEN 650 MG: 325 TABLET, FILM COATED ORAL at 09:24

## 2018-11-25 RX ADMIN — DULOXETINE HYDROCHLORIDE 30 MG: 30 CAPSULE, DELAYED RELEASE ORAL at 09:23

## 2018-11-25 RX ADMIN — LEVOTHYROXINE SODIUM 88 MCG: 88 TABLET ORAL at 06:44

## 2018-11-25 RX ADMIN — POTASSIUM CHLORIDE 10 MEQ: 750 CAPSULE, EXTENDED RELEASE ORAL at 06:44

## 2018-11-25 NOTE — PROGRESS NOTES
Continued Stay Note  [facility]     Patient Name: Joya Singh  MRN: [id number]  Today's Date: 11/25/2018    Admit Date: 11/19/2018    Discharge Plan     Row Name 11/25/18 2785       Plan    Plan  Home via private auto with family to assist and [facility] to follow--pt to go to [facility] for Epogen    Patient/Family in Agreement with Plan  yes    Plan Comments  CCP consult noted to assist pt with set up of outpatient Epogen infusion. Per previous CCP note, pt wanted to go to [facility] and they were closed before definitive plans could be set. Sent email to CCP Mgr Jazmín SHELDON and  Joseph HALEY to follow up with set up on Monday. CCP to follow...........JW         Discharge Codes    No documentation.             Syl Rocha RN

## 2018-11-25 NOTE — PLAN OF CARE
Problem: Patient Care Overview  Goal: Plan of Care Review  Outcome: Ongoing (interventions implemented as appropriate)   11/25/18 0514   Coping/Psychosocial   Plan of Care Reviewed With patient   Plan of Care Review   Progress no change   OTHER   Outcome Summary Complains of nausea no emesis blood sugars elevated due to steroids also has pain in left foot but is less and swelling and discoloration is less        Problem: Anemia (Adult)  Goal: Identify Related Risk Factors and Signs and Symptoms   11/25/18 0514   Anemia (Adult)   Related Risk Factors (Anemia) chronic illness   Signs and Symptoms (Anemia) syncope/near syncope;fatigue       Problem: Skin Injury Risk (Adult)  Goal: Identify Related Risk Factors and Signs and Symptoms   11/25/18 0514   Skin Injury Risk (Adult)   Related Risk Factors (Skin Injury Risk) advanced age;edema;mobility impaired

## 2018-11-25 NOTE — PROGRESS NOTES
Name: Joya Singh ADMIT: 2018   : 1940  PCP: Chari Mercado MD    MRN: [id number] LOS: 0 days   AGE/SEX: 78 y.o. female  ROOM: [facility]   Subjective   No CP SOA NVD. Wants to get home. Discussed insulin and BG. She follows a sliding scale at home with additional premeal short acting insulin.    Objective   Vital Signs  Temp:  [97.4 °F (36.3 °C)-98.6 °F (37 °C)] 98 °F (36.7 °C)  Heart Rate:  [64-84] 67  Resp:  [18] 18  BP: (132-147)/(56-78) 134/57  SpO2:  [93 %-100 %] 98 %  on  Flow (L/min):  [2] 2;   Device (Oxygen Therapy): nasal cannula  Body mass index is 37.1 kg/m².    Physical Exam   Constitutional: She is oriented to person, place, and time. She appears well-developed. No distress.   HENT:   Head: Atraumatic.   Nose: Nose normal.   Eyes: Conjunctivae and EOM are normal.   Neck: Normal range of motion. Neck supple.   Cardiovascular: Normal rate and regular rhythm.   Pulmonary/Chest: Effort normal and breath sounds normal.   Abdominal: Soft. There is no tenderness.   Musculoskeletal: Normal range of motion. She exhibits no edema.   Neurological: She is alert and oriented to person, place, and time.   Skin: Skin is warm and dry. She is not diaphoretic.   Psychiatric: She has a normal mood and affect. Her behavior is normal.   Nursing note and vitals reviewed.      Results Review:       I reviewed the patient's new clinical results.      Results from last 7 days   Lab Units  18   0429  18   0547  18   0416  18   0440   18   2358  18   1807   WBC 10*3/mm3   --    --    --   6.49   --   6.02  6.48   HEMOGLOBIN g/dL  8.5*  7.8*  7.6*  7.9*   < >  7.9*  6.9*   PLATELETS 10*3/mm3   --    --    --   412   --   411  492    < > = values in this interval not displayed.     Results from last 7 days   Lab Units  18   0550  18   0547  18   0339  18   1807   SODIUM mmol/L  141  139  144  142   POTASSIUM mmol/L  5.0  4.8  4.5  4.7   CHLORIDE  mmol/L  99  99  104  103   CO2 mmol/L  30.0*  29.6*  28.3  28.1   BUN mg/dL  32*  20  21  25*   CREATININE mg/dL  1.64*  1.82*  1.74*  1.74*   GLUCOSE mg/dL  230*  289*  144*  299*   Estimated Creatinine Clearance: 30.8 mL/min (A) (by C-G formula based on SCr of 1.64 mg/dL (H)).  Results from last 7 days   Lab Units  11/25/18   0550  11/23/18   0547  11/20/18   0339  11/19/18   1807   CALCIUM mg/dL  9.5  8.8  8.8  8.7*   ALBUMIN g/dL   --    --    --   3.50         atorvastatin 10 mg Oral Nightly   DULoxetine 30 mg Oral BID   epoetin chu 20,000 Units Subcutaneous Weekly   furosemide 20 mg Oral Daily   gabapentin 400 mg Oral TID   gabapentin 400 mg Oral Nightly   insulin glargine 30 Units Subcutaneous Q12H   insulin lispro 0-24 Units Subcutaneous 4x Daily With Meals & Nightly   levothyroxine 88 mcg Oral Q AM   MethylPREDNISolone 4 mg Oral 4x Daily Taper   [START ON 11/26/2018] MethylPREDNISolone 4 mg Oral TID Around Food   [START ON 11/27/2018] MethylPREDNISolone 4 mg Oral Before Breakfast   [START ON 11/27/2018] MethylPREDNISolone 4 mg Oral Tonight   [START ON 11/28/2018] MethylPREDNISolone 4 mg Oral Before Breakfast   pantoprazole 40 mg Oral QAM   potassium chloride 10 mEq Oral QAM   sodium chloride 3 mL Intravenous Q12H      Diet Regular; Consistent Carbohydrate      Assessment/Plan      Active Hospital Problems    Diagnosis Date Noted   • **Acute blood loss anemia [D62] 11/19/2018   • Gout attack [M10.9] 11/23/2018   • CKD stage 3 due to type 2 diabetes mellitus (CMS/[facility]) [E11.22, N18.3] 02/28/2017   • Chronic anemia [D64.9] 02/02/2016   • Hyperlipidemia [E78.5] 02/02/2016   • Hypertension [I10] 02/02/2016   • Obstructive sleep apnea syndrome [G47.33] 02/02/2016   • Chronic back pain [M54.9, G89.29] 02/02/2016   • Type 2 diabetes mellitus with neurologic complication (CMS/[facility]) [E11.49] 02/02/2016      Resolved Hospital Problems   No resolved problems to display.     - DM2: A1c 7.4. She takes quite a bit more  short acting insulin at home than she has gotten here so far. Increase to high dose sliding scale with BG in 400s. If improved this afternoon can discharge her home on her home insulin with home sliding scale. She should be able to taper her own insulin at home with steroid taper.  - Gout: medrol taper. Orthopedic surgery following.  - CKD3: Near baseline. Nephrology following.  - ABLA: Outpatient capsule endoscopy planned. GI evaluated.  - Dispo: HH/likely this afternoon    Tomasz Vee MD  [facility]  11/25/18  12:11 PM

## 2018-11-25 NOTE — THERAPY TREATMENT NOTE
Acute Care - Physical Therapy Treatment Note  [facility]     Patient Name: Joya Singh  : 1940  MRN: [id number]  Today's Date: 2018  Onset of Illness/Injury or Date of Surgery: 18  Date of Referral to PT: 18  Referring Physician: Dalton    Admit Date: 2018    Visit Dx:    ICD-10-CM ICD-9-CM   1. Impaired functional mobility and activity tolerance Z74.09 V49.89     Patient Active Problem List   Diagnosis   • Deep vein thrombosis of lower extremity (CMS/HCC)   • Arthritis   • Chronic back pain   • Chronic anemia   • Type 2 diabetes mellitus with neurologic complication (CMS/HCC)   • Brittle diabetes mellitus (CMS/HCC)   • Dysuria   • Hyperlipidemia   • Hypertension   • Insomnia with sleep apnea   • Spasm   • Obstructive sleep apnea syndrome   • Peripheral neuropathy   • Speech disturbance   • Warfarin anticoagulation   • Diabetic gastroparesis (CMS/HCC)   • Primary localized osteoarthrosis of left shoulder region   • Rotator cuff tendonitis   • Acquired hypothyroidism   • Anxiety   • Gastroesophageal reflux disease   • History of biliary T-tube placement   • 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)   • Complex tear of medial meniscus of left knee as current injury   • Insufficiency fracture of tibia   • Primary osteoarthritis of left knee   • Hypoxia   • Low blood pressure   • Hyperkalemia   • Tendinitis of right rotator cuff   • AC joint arthropathy   • Subacromial impingement of right shoulder   • Right carpal tunnel syndrome   • Community acquired pneumonia of left lung   • Gastrointestinal hemorrhage   • Acute blood loss anemia   • Gout attack       Therapy Treatment    Rehabilitation Treatment Summary     Row Name 18 0514             Treatment Time/Intention    Discipline  physical therapy assistant  -SI      Document Type  therapy note (daily note)  -SI      Subjective Information  no complaints  -SI      Patient Effort  excellent  -SI      Comment  sub-acute  pain left foot  -SI      Existing Precautions/Restrictions  fall;oxygen therapy device and L/min  -SI      Patient Response to Treatment  excellent  -SI      Recorded by [SI] Anna Park, PTA 11/25/18 0920      Row Name 11/25/18 0514             Vital Signs    Pre SpO2 (%)  95  -SI      O2 Delivery Pre Treatment  supplemental O2  -SI      O2 Delivery Intra Treatment  supplemental O2  -SI      Post SpO2 (%)  97  -SI      O2 Delivery Post Treatment  supplemental O2  -SI      Recorded by [SI] Anna Park, PTA 11/25/18 0920      Row Name 11/25/18 0514             Cognitive Assessment/Intervention- PT/OT    Orientation Status (Cognition)  oriented x 4  -SI      Follows Commands (Cognition)  WNL  -SI      Personal Safety Interventions  gait belt;fall prevention program maintained  -SI      Recorded by [SI] Anna Park, PTA 11/25/18 0920      Row Name 11/25/18 0514             Bed Mobility Assessment/Treatment    Supine-Sit [place] (Bed Mobility)  supervision  -SI      Recorded by [SI] Anna Park, PTA 11/25/18 0920      Row Name 11/25/18 0514             Sit-Stand Transfer    Sit-Stand [place] (Transfers)  contact guard;verbal cues  -SI      Assistive Device (Sit-Stand Transfers)  walker, front-wheeled  -SI      Recorded by [SI] Anna Park, PTA 11/25/18 0920      Row Name 11/25/18 0514             Stand-Sit Transfer    Stand-Sit [place] (Transfers)  contact guard  -SI      Assistive Device (Stand-Sit Transfers)  walker, front-wheeled  -SI      Recorded by [SI] Anna Park, PTA 11/25/18 0920      Row Name 11/25/18 0514             Gait/Stairs Assessment/Training    [place] Level (Gait)  contact guard  -SI      Assistive Device (Gait)  walker, front-wheeled  -SI      Distance in Feet (Gait)  200  -SI      Pattern (Gait)  step-through  -SI      Deviations/Abnormal Patterns (Gait)  stride length decreased  -SI      Comment (Gait/Stairs)  no dizziness today  -SI      Recorded by  [SI] Anna Park, PTA 11/25/18 0920      Row Name 11/25/18 0514             Static Sitting Balance    Level of [place] (Unsupported Sitting, Static Balance)  independent  -SI      Recorded by [SI] Anna Park, PTA 11/25/18 0920      Row Name 11/25/18 0514             Static Standing Balance    Level of [place] (Supported Standing, Static Balance)  contact guard assist  -SI      Recorded by [SI] Renee Parkila BEREKET, PTA 11/25/18 0920      Row Name 11/25/18 0514             Positioning and Restraints    Pre-Treatment Position  in bed  -SI      Post Treatment Position  chair  -SI      In Bed  sitting;call light within reach  -SI      Recorded by [SI] Anna Park, [facility] 11/25/18 0920      Row Name 11/25/18 0514             Pain Scale: Numbers Pre/Post-Treatment    Pain Scale: Numbers, Pretreatment  1/10  -SI      Pain Scale: Numbers, Post-Treatment  1/10  -SI      Pain Location - Side  Left  -SI      Pain Location - Orientation  -- foot  -SI      Recorded by [SI] DaisyRenee grahamila BEREKET, [facility] 11/25/18 0920        User Key  (r) = Recorded By, (t) = Taken By, (c) = Cosigned By    Initials Name Effective Dates Discipline    SI Anna Park, [facility] 05/18/15 -  PT                   Physical Therapy Education     Title: PT OT SLP Therapies (In Progress)     Topic: Physical Therapy (Done)     Point: Mobility training (Done)     Learning Progress Summary           Patient Acceptance, E,TB, VU by SI at 11/24/2018 10:19 AM    Comment:  Pt and siddhartha used to her level of assistance at home and discussed.  RWX, and WC when oob in community    Acceptance, E,TB,D, VU by JM at 11/23/2018  4:13 PM    Acceptance, E,TB, VU,DU by CW at 11/22/2018  2:45 PM    Acceptance, E,D, NR by PC at 11/21/2018  3:07 PM                   Point: Home exercise program (Done)     Learning Progress Summary           Patient Acceptance, E,TB, VU by SI at 11/24/2018 10:19 AM    Comment:  Pt and siddhartha used to her level of assistance at home and  discussed.  RWX, and WC when oob in community    Acceptance, E,TB,D, VU by JM at 11/23/2018  4:13 PM    Acceptance, E,TB, VU,DU by CW at 11/22/2018  2:45 PM    Acceptance, E,D, NR by PC at 11/21/2018  3:07 PM                   Point: Body mechanics (Done)     Learning Progress Summary           Patient Acceptance, E,TB, VU by SI at 11/24/2018 10:19 AM    Comment:  Pt and sonharish used to her level of assistance at home and discussed.  RWX, and WC when oob in community    Acceptance, E,TB,D, VU by JM at 11/23/2018  4:13 PM    Acceptance, E,TB, VU,DU by CW at 11/22/2018  2:45 PM    Acceptance, E,D, NR by PC at 11/21/2018  3:07 PM                   Point: Precautions (Done)     Learning Progress Summary           Patient Acceptance, E,TB, VU by SI at 11/24/2018 10:19 AM    Comment:  Pt and sonharish used to her level of assistance at home and discussed.  RWX, and WC when oob in community    Acceptance, E,TB,D, VU by  at 11/23/2018  4:13 PM    Acceptance, E,TB, VU,DU by CW at 11/22/2018  2:45 PM    Acceptance, E,D, NR by PC at 11/21/2018  3:07 PM                               User Key     Initials Effective Dates Name Provider Type Discipline    SI 05/18/15 -  Anna Park, PTA Physical Therapy Assistant PT     04/03/18 -  Brook Rees, PT Physical Therapist PT     03/07/18 -  Renae Blair PTA Physical Therapy Assistant PT     03/07/18 -  Michael Patel, ALE Physical Therapy Assistant PT                PT Recommendation and Plan  Therapy Frequency (PT Clinical Impression): daily  Plan of Care Reviewed With: patient, son  Outcome Measures     Row Name 11/25/18 0900 11/24/18 1000 11/23/18 1600       How much help from another person do you currently need...    Turning from your back to your side while in flat bed without using bedrails?  4  -SI  4  -SI  4  -JM    Moving from lying on back to sitting on the side of a flat bed without bedrails?  4  -SI  4  -SI  3  -JM    Moving to and from a bed to a chair  (including a wheelchair)?  3  -SI  3  -SI  3  -JM    Standing up from a chair using your arms (e.g., wheelchair, bedside chair)?  3  -SI  3  -SI  3  -JM    Climbing 3-5 steps with a railing?  3  -SI  2  -SI  1  -JM    To walk in hospital room?  3  -SI  3  -SI  3  -JM    AM-PAC 6 Clicks Score  20  -SI  19  -SI  17  -JM       Functional Assessment    Outcome Measure Options  AM-PAC 6 Clicks Basic Mobility (PT)  -SI  AM-PAC 6 Clicks Basic Mobility (PT)  -SI  --    Row Name 11/22/18 1400             How much help from another person do you currently need...    Turning from your back to your side while in flat bed without using bedrails?  4  -CW      Moving from lying on back to sitting on the side of a flat bed without bedrails?  4  -CW      Moving to and from a bed to a chair (including a wheelchair)?  3  -CW      Standing up from a chair using your arms (e.g., wheelchair, bedside chair)?  3  -CW      Climbing 3-5 steps with a railing?  2  -CW      To walk in hospital room?  3  -CW      AM-PAC 6 Clicks Score  19  -CW         Functional Assessment    Outcome Measure Options  AM-PAC 6 Clicks Basic Mobility (PT)  -CW        User Key  (r) = Recorded By, (t) = Taken By, (c) = Cosigned By    Initials Name Provider Type    SI Anna Park, ALE Physical Therapy Assistant    Renae Ricci, PTA Physical Therapy Assistant    CW Michael Patel, PTA Physical Therapy Assistant         Time Calculation:   PT Charges     Row Name 11/25/18 0922             Time Calculation    Start Time  0900  -SI      Stop Time  0920  -SI      Time Calculation (min)  20 min  -SI      PT Received On  11/25/18  -SI      PT - Next Appointment  11/26/18  -SI         Time Calculation- PT    TCU Minutes- PT  20 min  -SI        User Key  (r) = Recorded By, (t) = Taken By, (c) = Cosigned By    Initials Name Provider Type    Anna Cheng PTA Physical Therapy Assistant        Therapy Suggested Charges     Code   Minutes Charges    None            Therapy Charges for Today     Code Description Service Date Service Provider Modifiers Qty    32683130966 HC PT THER PROC EA 15 MIN 11/24/2018 Anna Park, ALE GP 1    56699729056 HC PT THER PROC EA 15 MIN 11/25/2018 Anna Park PTA GP 1          PT G-Codes  PT Professional Judgement Used?: Yes  Outcome Measure Options: AM-PAC 6 Clicks Basic Mobility (PT)  AM-PAC 6 Clicks Score: 20  Functional Limitation: Mobility: Walking and moving around  Mobility: Walking and Moving Around Current Status (): At least 40 percent but less than 60 percent impaired, limited or restricted  Mobility: Walking and Moving Around Goal Status (): At least 1 percent but less than 20 percent impaired, limited or restricted    Anna Park PTA  11/25/2018

## 2018-11-25 NOTE — PLAN OF CARE
Problem: Patient Care Overview  Goal: Plan of Care Review  Pt with sub-acute left foot pain and mild swelling.  Gait with  feet with CG assist of 1.  Amb with O2.  Up in chair.  Tolerated well

## 2018-11-25 NOTE — PROGRESS NOTES
" LOS: 0 days   Patient Care Team:  Chari Mercado MD as PCP - General  Chari Mercado MD as PCP - Family Medicine  Christos Rob MD as Surgeon (General Surgery)  German Wylie MD as Surgeon (Orthopedic Surgery)    Chief Complaint/ Reason for encounter: Chronic kidney disease, anemia management        Subjective     Medical history reviewed:  History of Present Illness    Subjective:  Symptoms:  Improved.  No shortness of breath, chest pain or weakness.  (Resting, pain improved  No new complaints).    Diet:  Adequate intake.    Activity level: Returning to normal.    Pain:  She reports no pain.          History taken from: Patient and chart    Objective     Vital Signs  Temp:  [97.4 °F (36.3 °C)-98.6 °F (37 °C)] 98 °F (36.7 °C)  Heart Rate:  [64-84] 67  Resp:  [18] 18  BP: (132-147)/(56-78) 134/57       Wt Readings from Last 1 Encounters:   11/19/18 2238 92 kg (202 lb 14.4 oz)       Objective:  General Appearance:  Comfortable, in no acute distress, not in pain and well-appearing (Obese, chronically ill-appearing).    Vital signs: (most recent): Blood pressure 134/57, pulse 67, temperature 98 °F (36.7 °C), temperature source Oral, resp. rate 18, height 157.5 cm (62.01\"), weight 92 kg (202 lb 14.4 oz), SpO2 98 %.  Vital signs are normal.  No fever.    Output: Producing urine.    HEENT: Normal HEENT exam.    Lungs:  Normal effort and normal respiratory rate.  Breath sounds clear to auscultation.  She is not in respiratory distress.  No decreased breath sounds.    Heart: Normal rate.  Regular rhythm.  S1 normal.  No murmur.   Abdomen: Abdomen is soft.  Bowel sounds are normal.   There is no abdominal tenderness.     Extremities: Normal range of motion.  There is no deformity.  (Trace edema bilaterally)  Pulses: Distal pulses are intact.    Neurological: Patient is alert and oriented to person, place and time.    Skin:  Warm and dry.  No rash or cyanosis.             Results Review:  "   Intake/Output:     Intake/Output Summary (Last 24 hours) at 11/25/2018 1150  Last data filed at 11/25/2018 0600  Gross per 24 hour   Intake 400 ml   Output --   Net 400 ml         DATA:  Radiology and Labs:  The following labs independently reviewed by me. Additional labs ordered for tomorrow a.m.  Interval notes, chart personally reviewed by me.   Old records independently reviewed showing stage III chronic kidney disease, baseline creatinine 1.7      Labs:   Recent Results (from the past 24 hour(s))   POC Glucose Once    Collection Time: 11/24/18  4:43 PM   Result Value Ref Range    Glucose 485 (C) 70 - 130 mg/dL   POC Glucose Once    Collection Time: 11/24/18  4:44 PM   Result Value Ref Range    Glucose 472 (C) 70 - 130 mg/dL   POC Glucose Once    Collection Time: 11/24/18  8:45 PM   Result Value Ref Range    Glucose 426 (H) 70 - 130 mg/dL   Basic Metabolic Panel    Collection Time: 11/25/18  5:50 AM   Result Value Ref Range    Glucose 230 (H) 65 - 99 mg/dL    BUN 32 (H) 8 - 23 mg/dL    Creatinine 1.64 (H) 0.57 - 1.00 mg/dL    Sodium 141 136 - 145 mmol/L    Potassium 5.0 3.5 - 5.2 mmol/L    Chloride 99 98 - 107 mmol/L    CO2 30.0 (H) 22.0 - 29.0 mmol/L    Calcium 9.5 8.6 - 10.5 mg/dL    eGFR Non African Amer 30 (L) >60 mL/min/1.73    BUN/Creatinine Ratio 19.5 7.0 - 25.0    Anion Gap 12.0 mmol/L   POC Glucose Once    Collection Time: 11/25/18  6:04 AM   Result Value Ref Range    Glucose 254 (H) 70 - 130 mg/dL   POC Glucose Once    Collection Time: 11/25/18 11:22 AM   Result Value Ref Range    Glucose 423 (H) 70 - 130 mg/dL       Radiology:  Imaging Results (last 24 hours)     ** No results found for the last 24 hours. **             Medications have been reviewed:  Current Facility-Administered Medications   Medication Dose Route Frequency Provider Last Rate Last Dose   • acetaminophen (TYLENOL) tablet 650 mg  650 mg Oral Q4H PRN Izzy Owusu APRN   650 mg at 11/25/18 0924   • atorvastatin (LIPITOR) tablet 10  mg  10 mg Oral Nightly Tigre Hoffman MD   10 mg at 11/24/18 2112   • cyclobenzaprine (FLEXERIL) tablet 5 mg  5 mg Oral BID PRN Tigre Hoffman MD   5 mg at 11/22/18 0611   • dextrose (D50W) 25 g/ 50mL Intravenous Solution 25 g  25 g Intravenous Q15 Min PRN Izzy Owusu APRN       • dextrose (D50W) 25 g/ 50mL Intravenous Solution 25 g  25 g Intravenous Q15 Min PRN Luis Angel Hernandez MD       • dextrose (GLUTOSE) oral gel 15 g  15 g Oral Q15 Min PRN Izzy Owusu APRN       • dextrose (GLUTOSE) oral gel 15 g  15 g Oral Q15 Min PRN Luis Angel Hernandez MD       • DULoxetine (CYMBALTA) DR capsule 30 mg  30 mg Oral BID Tigre Hoffman MD   30 mg at 11/25/18 0923   • epoetin chu (EPOGEN,PROCRIT) injection 20,000 Units  20,000 Units Subcutaneous Weekly Yasmani Baugh MD   20,000 Units at 11/21/18 1220   • furosemide (LASIX) tablet 20 mg  20 mg Oral Daily Tigre Hoffman MD   20 mg at 11/25/18 0923   • gabapentin (NEURONTIN) capsule 400 mg  400 mg Oral TID Tigre Hoffman MD   400 mg at 11/25/18 0924   • gabapentin (NEURONTIN) capsule 400 mg  400 mg Oral Nightly Tigre Hoffman MD   400 mg at 11/24/18 2113   • glucagon (human recombinant) (GLUCAGEN DIAGNOSTIC) injection 1 mg  1 mg Subcutaneous PRN Izzy Owusu, APRN       • glucagon (human recombinant) (GLUCAGEN DIAGNOSTIC) injection 1 mg  1 mg Subcutaneous PRN Luis Angel Hernandez MD       • insulin glargine (LANTUS) injection 30 Units  30 Units Subcutaneous Q12H Tigre Hoffman MD   30 Units at 11/25/18 0924   • insulin lispro (humaLOG) injection 0-9 Units  0-9 Units Subcutaneous 4x Daily With Meals & Nightly Luis Angel Hernandez MD   6 Units at 11/25/18 0924   • levothyroxine (SYNTHROID, LEVOTHROID) tablet 88 mcg  88 mcg Oral Q AM Tigre Hoffman MD   88 mcg at 11/25/18 0644   • MethylPREDNISolone (MEDROL (ANIL)) tablet 4 mg  4 mg Oral 4x Daily Taper Luis Angel Hernandez MD   4 mg at 11/25/18 0644   • [START ON 11/26/2018] MethylPREDNISolone  (MEDROL (ANIL)) tablet 4 mg  4 mg Oral TID Around Food Luis Angel Hernandez MD       • [START ON 11/27/2018] MethylPREDNISolone (MEDROL (ANIL)) tablet 4 mg  4 mg Oral Before Breakfast Luis Angel Hernandez MD       • [START ON 11/27/2018] MethylPREDNISolone (MEDROL (ANIL)) tablet 4 mg  4 mg Oral Tonight Luis Angel Hernandez MD       • [START ON 11/28/2018] MethylPREDNISolone (MEDROL (ANIL)) tablet 4 mg  4 mg Oral Before Breakfast Luis Angel Hernandez MD       • nitroglycerin (NITROSTAT) SL tablet 0.4 mg  0.4 mg Sublingual Q5 Min PRN Izzy Owusu APRN       • ondansetron (ZOFRAN) tablet 4 mg  4 mg Oral Q6H PRN Izzy Owusu APRN        Or   • ondansetron ODT (ZOFRAN-ODT) disintegrating tablet 4 mg  4 mg Oral Q6H PRN Izzy Owusu APRFRANCIS        Or   • ondansetron (ZOFRAN) injection 4 mg  4 mg Intravenous Q6H PRN Izzy Owusu APRN   4 mg at 11/24/18 1801   • pantoprazole (PROTONIX) EC tablet 40 mg  40 mg Oral Tigre Sparks MD   40 mg at 11/25/18 0644   • potassium chloride (MICRO-K) CR capsule 10 mEq  10 mEq Oral Tigre Sparks MD   10 mEq at 11/25/18 0644   • sodium chloride 0.9 % flush 1-10 mL  1-10 mL Intravenous PRN Izzy Owusu APRN       • sodium chloride 0.9 % flush 3 mL  3 mL Intravenous Q12H Izzy Owusu APRN   3 mL at 11/25/18 0925       ASSESSMENT:  Chronic kidney disease stage III, fairly stable and near baseline   Acute on chronic anemia, some component of anemia related to chronic kidney disease.  Iron studies adequate  Acute blood loss anemia, off Eliquis   Type 2 diabetes mellitus  Hypertension  Obstructive sleep apnea  Obesity   Gout, acute flare, on oral steroids and colchicine        PLAN:    Renal function, volume and electrolytes  stable, near baseline   Continue weekly Epogen,  outpatient Epogen to help stabilize hgb levels and prevent future transfusions.  Management of gout per orthopedics/primary team, on oral steroids and colchicine  Continue Lasix and supplemental potassium  Hgb is  improved to 8.5      Continue to monitor electrolytes and volume closely  Discharge planning    Yasmani Baugh MD   [facility]   Office phone number: [phone]  Answering service phone number: [phone]    11/25/18  11:50 AM      Dictation performed using Dragon dictation software

## 2018-11-25 NOTE — DISCHARGE SUMMARY
Date of Admission: 11/19/2018  Date of Discharge:  11/25/2018  Primary Care Physician: Chari Mercado MD     Discharge Diagnosis:  Active Hospital Problems    Diagnosis Date Noted   • **Acute blood loss anemia [D62] 11/19/2018   • Gout attack [M10.9] 11/23/2018   • 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) [E11.22, N18.3] 02/28/2017   • Chronic anemia [D64.9] 02/02/2016   • Hyperlipidemia [E78.5] 02/02/2016   • Hypertension [I10] 02/02/2016   • Obstructive sleep apnea syndrome [G47.33] 02/02/2016   • Chronic back pain [M54.9, G89.29] 02/02/2016   • Type 2 diabetes mellitus with neurologic complication (CMS/HCC) [E11.49] 02/02/2016      Resolved Hospital Problems   No resolved problems to display.       Presenting Problem/History of Present Illness:  Acute blood loss anemia [D62]   Ms. Singh is a 78 y.o. female with a history of CKD3, DM2, GERD, GI bleed, HLD, HTN, Hypothyroidism, AARON, CVA that presents to [facility] complaining of dizziness/lightheadedness and low hemoglobin. Patient was hospitalized about 3 weeks ago at [place] for GI bleed though no source of bleeding was identified at that time (EGD and colonoscopy unremarkable) and apparently her use of eliquis wasn't addressed either. She states that she was given 5 units PRBC on last admission and since discharge, has become progressively more fatigued, weak, dizzy/lightheaded, short of breath. These symptoms have been ongoing since that time but reportedly worse in the past 3 days. She was seen at PCP today and POC hemoglobin resulted at 5.5 in office and she was sent to ED. Upon arrival to [place] ED [facility], her hemoglobin was actually 6.5 and she was given 1 unit PRBC prior to transfer from [place]. She does report orange/red tinged stool over the past 3-4 days and an intermittent aching pain to her right abdomen that she's experienced in that time. She reports that she has been previously diagnosed with anemia, but  can not elaborate on details of her workup. She denies fever, chills, chest pain, nausea, vomiting, change in appetite, changes in bladder habits, edema.        Hospital Course:  Mrs Singh is a 78-year-old female admitted for complaints of dizziness shortness of breath and recurrent low hemoglobin.  Patient was formerly anticoagulated with Eliquis and had received multiple transfusions prior to this admission and even received an additional unit prior to transfer from [place] emergency department.  Her anemia is multifactorial and there are concerns for possible GI etiology though patient had already undergone upper and lower endoscopy.  I discussed the case with Dr. Rogers with GI on numerous occasions and the plan from his standpoint is to get an outpatient capsule endoscopy ordered.  Over the last couple days her hemoglobins have been stabilizing in the 7 range.  She's currently trended up to 7.8.  The concern was for possible nephrology involvement in her anemia so we did consult renal.  Epogen has been started and patient is tolerating that treatment at this point with plans for additional Epogen to be dosed in an outpatient setting and CCP to help coordinate.  She does have aspects of CKD stage III likely secondary to diabetes.  Her A1c was 7.43 and otherwise her control is pretty good and she is followed closely by  in an outpatient setting.  At this point orthopedics started patient on Medrol Dosepak and she has some significant reactive hyperglycemia to where she is now approaching levels of 500. The steroids are currently being dosed by orthopedics due to her left foot and ankle pain.  Her uric acid was mildly elevated and this could possibly be gout versus arthritic inflammation an exacerbation.  I placed her on trial colchicine to try to avoid steroids and consulted orthopedics.  The following day Dr Hernandez added steroids and also checked ESR and CRP which were quite elevated.  This then led  to an MRI of the foot and ankle which shows arthritic and inflammatory changes but does not reveal any fluid collection that could be aspirated to help pin down diagnosis.  She is working well with PT with improving symptoms and the plan is home with home health.    She had hyperglycemia today.  I discussed her home insulin regimen and she takes 30-35 units before each meal plus a home sliding scale of short-acting insulin in addition to long-acting insulin daily.  We gave her an additional 24 units which decreased her blood sugar to 400 and she is having no symptoms.  She is aware of her current hyperglycemia and reports she would like to be discharged home with home health today and that she can manage her blood sugar at home.    Exam Today:  Constitutional: She is oriented to person, place, and time. She appears well-developed. No distress.   HENT:   Head: Atraumatic.   Nose: Nose normal.   Eyes: Conjunctivae and EOM are normal.   Neck: Normal range of motion. Neck supple.   Cardiovascular: Normal rate and regular rhythm.   Pulmonary/Chest: Effort normal and breath sounds normal.   Abdominal: Soft. There is no tenderness.   Musculoskeletal: Normal range of motion. She exhibits no edema.   Neurological: She is alert and oriented to person, place, and time.   Skin: Skin is warm and dry. She is not diaphoretic.   Psychiatric: She has a normal mood and affect. Her behavior is normal.     Results:  MRI Left Foot  1. No evidence for fracture or acute abnormality.  2. 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  3. Mild hallux valgus deformity.  4. Edema within the dorsal midfoot and forefoot subcutaneous fat.  Generalized muscular atrophy.    Procedures Performed:         Consults:   Consults     Date and Time Order Name Status Description    11/22/2018 0913 Inpatient Orthopedic Surgery Consult Completed     11/20/2018 8349 Inpatient Nephrology  Consult Completed     11/19/2018 0336 Inpatient Gastroenterology Consult Completed     10/25/2018 2053 Inpatient Cardiology Consult Completed     10/25/2018 2050 Inpatient Gastroenterology Consult Completed            Discharge Disposition:  Home-Health Care [facility]    Discharge Medications:     Discharge Medications      New Medications      Instructions Start Date   MethylPREDNISolone 4 MG tablet  Commonly known as:  MEDROL (ANIL)   Take as directed on package instructions.         Changes to Medications      Instructions Start Date   midodrine 2.5 MG tablet  Commonly known as:  PROAMATINE  What changed:  additional instructions   2.5 mg, Oral, 2 Times Daily         Continue These Medications      Instructions Start Date   ACCU-CHEK FASTCLIX LANCETS misc   TEST 3-4 TIMES DAILY AS DIRECTED      ACCU-CHEK KENNETH SMARTVIEW w/Device kit   TEST blood sugar three times daily or as directed      ACCU-CHEK SMARTVIEW test strip  Generic drug:  glucose blood   TEST blood sugar three times daily OR AS DIRECTED      acetaminophen 325 MG tablet  Commonly known as:  TYLENOL   650 mg, Oral, Every 6 Hours PRN, OTC product      atorvastatin 10 MG tablet  Commonly known as:  LIPITOR   10 mg, Oral, Nightly      B-D SINGLE USE SWABS REGULAR pads   Does not apply      DULoxetine 30 MG capsule  Commonly known as:  CYMBALTA   30 mg, Oral, 2 Times Daily      furosemide 20 MG tablet  Commonly known as:  LASIX   20 mg, Oral, Daily      gabapentin 400 MG capsule  Commonly known as:  NEURONTIN   400 mg, Oral, 3 Times Daily, 400 mg AM, 400 mg NOON, 800 mg QHS      gabapentin 400 MG capsule  Commonly known as:  NEURONTIN   400 mg, Oral, Every Night at Bedtime, 400 mg AM, 400 mg NOON, 800 mg QHS      insulin aspart 100 UNIT/ML solution pen-injector sc pen  Commonly known as:  novoLOG FLEXPEN   30 Units, Subcutaneous, Every Morning Before Breakfast, 30 units with breakfast 35 units with lunch 35 units with dinner      insulin aspart 100 UNIT/ML  "injection  Commonly known as:  novoLOG   35 Units, Subcutaneous, Daily Before Lunch, 30 units with breakfast 35 units with lunch 35 units with dinner      insulin aspart 100 UNIT/ML injection  Commonly known as:  novoLOG   35 Units, Subcutaneous, Daily Before Supper, 30 units with breakfast 35 units with lunch 35 units with dinner      levothyroxine 88 MCG tablet  Commonly known as:  SYNTHROID   88 mcg, Oral, Daily      Needle (Disp) 30G X 1/2\" misc  Commonly known as:  BD DISP NEEDLES   To be used 3 times daily with Novolog Flexpen.      O2  Commonly known as:  OXYGEN   2 L/min, Inhalation, Nightly, Uses 2L  overnight only      omeprazole 40 MG capsule  Commonly known as:  priLOSEC   Take one (1) capsule orally (by mouth) once daily      potassium chloride 10 MEQ CR capsule  Commonly known as:  MICRO-K   10 mEq, Oral, Every Morning      promethazine 12.5 MG tablet  Commonly known as:  PHENERGAN   1/2 to 1 tablet every 6 hours as needed for nausea      TRESIBA FLEXTOUCH 200 UNIT/ML solution pen-injector  Generic drug:  Insulin Degludec   50 Units, Subcutaneous, Every Night at Bedtime         Stop These Medications    ELIQUIS 2.5 MG tablet tablet  Generic drug:  apixaban            Discharge Diet:   Diet Instructions     Diet: Consistent Carbohydrate      Discharge Diet:  Consistent Carbohydrate          Activity at Discharge:   Activity Instructions     Activity as Tolerated            Follow-up Appointments:  Additional Instructions for the Follow-ups that You Need to Schedule     Referral to Home Health   As directed      Face to Face Visit Date:  11/25/2018    Follow-up Provider for Plan of Care?:  I treated the patient in an acute care facility and will not continue treatment after discharge.    Follow-up Provider:  MARIJA REA [[id number]]    Reason/Clinical Findings:  weakness    Describe mobility limitations that make leaving home difficult:  see above    Nursing/Therapeutic Services Requested:  " Physical Therapy Occupational Therapy    PT orders:  Therapeutic exercise Strengthening    Occupational orders:  Strengthening    Frequency:  1 Week 1            Contact information for follow-up providers     Yasmani Baugh MD Follow up.    Specialty:  Nephrology  Contact information:  [address]  [facility] [address]  [phone]             Luis Angel Hernandez MD Follow up.    Specialty:  Orthopedic Surgery  Contact information:  [address]  [address]  [facility] [address]  [phone]             Chari Mercado MD Follow up.    Specialties:  Internal Medicine & Pediatrics, Pediatrics  Contact information:  [address]  [address]  [facility] [address]  [phone]                   Contact information for after-discharge care     Durable Medical [facility]     [facility]    Service:  Durable Medical Equipment  Contact information:  [address]  [facility] [address] [phone]                 Home Medical Care     [facility] Follow up.    Service:  Home Health Services  Contact information:  [address]  [facility] [address] [phone]                             Test Results Pending at Discharge:       Tomasz Vee MD  11/25/18  1:43 PM    Time Spent on Discharge Activities: >30 minutes

## 2018-11-26 ENCOUNTER — OFFICE VISIT (OUTPATIENT)
Dept: INTERNAL MEDICINE | Facility: CLINIC | Age: 78
End: 2018-11-26

## 2018-11-26 ENCOUNTER — READMISSION MANAGEMENT (OUTPATIENT)
Dept: CALL CENTER | Facility: HOSPITAL | Age: 78
End: 2018-11-26

## 2018-11-26 VITALS
HEART RATE: 72 BPM | DIASTOLIC BLOOD PRESSURE: 68 MMHG | SYSTOLIC BLOOD PRESSURE: 120 MMHG | BODY MASS INDEX: 37.1 KG/M2 | OXYGEN SATURATION: 97 % | HEIGHT: 62 IN | TEMPERATURE: 98.2 F | RESPIRATION RATE: 18 BRPM

## 2018-11-26 DIAGNOSIS — D64.9 CHRONIC ANEMIA: ICD-10-CM

## 2018-11-26 DIAGNOSIS — Z79.4 TYPE 2 DIABETES MELLITUS WITH DIABETIC NEUROPATHY, WITH LONG-TERM CURRENT USE OF INSULIN (HCC): ICD-10-CM

## 2018-11-26 DIAGNOSIS — E11.40 TYPE 2 DIABETES MELLITUS WITH DIABETIC NEUROPATHY, WITH LONG-TERM CURRENT USE OF INSULIN (HCC): ICD-10-CM

## 2018-11-26 DIAGNOSIS — M62.838 MUSCLE SPASM: ICD-10-CM

## 2018-11-26 DIAGNOSIS — I95.1 ORTHOSTATIC HYPOTENSION: ICD-10-CM

## 2018-11-26 DIAGNOSIS — N18.30 CKD STAGE 3 DUE TO TYPE 2 DIABETES MELLITUS (HCC): ICD-10-CM

## 2018-11-26 DIAGNOSIS — D62 ACUTE BLOOD LOSS ANEMIA: Primary | ICD-10-CM

## 2018-11-26 DIAGNOSIS — E11.22 CKD STAGE 3 DUE TO TYPE 2 DIABETES MELLITUS (HCC): ICD-10-CM

## 2018-11-26 LAB
ALBUMIN SERPL-MCNC: 4.1 G/DL (ref 3.5–5.2)
ALBUMIN/GLOB SERPL: 1.3 G/DL
ALP SERPL-CCNC: 129 U/L (ref 40–129)
ALT SERPL-CCNC: 17 U/L (ref 5–33)
AST SERPL-CCNC: 13 U/L (ref 5–32)
BASOPHILS # BLD AUTO: 0.11 10*3/MM3 (ref 0–0.2)
BASOPHILS NFR BLD AUTO: 1.4 % (ref 0–2)
BILIRUB SERPL-MCNC: 0.4 MG/DL (ref 0.2–1.2)
BUN SERPL-MCNC: 40 MG/DL (ref 8–23)
BUN/CREAT SERPL: 22 (ref 7–25)
CALCIUM SERPL-MCNC: 9.5 MG/DL (ref 8.8–10.5)
CHLORIDE SERPL-SCNC: 99 MMOL/L (ref 98–107)
CO2 SERPL-SCNC: 32.6 MMOL/L (ref 22–29)
CREAT SERPL-MCNC: 1.82 MG/DL (ref 0.57–1)
EOSINOPHIL # BLD AUTO: 0.46 10*3/MM3 (ref 0.1–0.3)
EOSINOPHIL NFR BLD AUTO: 6 % (ref 0–4)
ERYTHROCYTE [DISTWIDTH] IN BLOOD BY AUTOMATED COUNT: 18.6 % (ref 11.5–14.5)
GLOBULIN SER CALC-MCNC: 3.1 GM/DL
GLUCOSE SERPL-MCNC: 84 MG/DL (ref 65–99)
HCT VFR BLD AUTO: 27.4 % (ref 37–47)
HGB BLD-MCNC: 8.6 G/DL (ref 12–16)
IMM GRANULOCYTES # BLD: 0.12 10*3/MM3 (ref 0–0.03)
IMM GRANULOCYTES NFR BLD: 1.6 % (ref 0–0.5)
LYMPHOCYTES # BLD AUTO: 2.84 10*3/MM3 (ref 0.6–4.8)
LYMPHOCYTES NFR BLD AUTO: 37.2 % (ref 20–45)
MCH RBC QN AUTO: 32.2 PG (ref 27–31)
MCHC RBC AUTO-ENTMCNC: 31.4 G/DL (ref 31–37)
MCV RBC AUTO: 102.6 FL (ref 81–99)
MONOCYTES # BLD AUTO: 0.48 10*3/MM3 (ref 0–1)
MONOCYTES NFR BLD AUTO: 6.3 % (ref 3–8)
NEUTROPHILS # BLD AUTO: 3.62 10*3/MM3 (ref 1.5–8.3)
NEUTROPHILS NFR BLD AUTO: 47.5 % (ref 45–70)
NRBC BLD AUTO-RTO: 0 /100 WBC (ref 0–0)
PLATELET # BLD AUTO: 513 10*3/MM3 (ref 140–500)
POTASSIUM SERPL-SCNC: 5.2 MMOL/L (ref 3.5–5.2)
PROT SERPL-MCNC: 7.2 G/DL (ref 6–8.5)
RBC # BLD AUTO: 2.67 10*6/MM3 (ref 4.2–5.4)
SODIUM SERPL-SCNC: 143 MMOL/L (ref 136–145)
WBC # BLD AUTO: 7.63 10*3/MM3 (ref 4.8–10.8)

## 2018-11-26 PROCEDURE — 99214 OFFICE O/P EST MOD 30 MIN: CPT | Performed by: INTERNAL MEDICINE

## 2018-11-26 RX ORDER — CYCLOBENZAPRINE HCL 10 MG
10 TABLET ORAL 2 TIMES DAILY PRN
Qty: 30 TABLET | Refills: 0 | Status: SHIPPED | OUTPATIENT
Start: 2018-11-26 | End: 2018-12-11 | Stop reason: SDUPTHER

## 2018-11-26 NOTE — PROGRESS NOTES
Case Management Discharge Note    Final Note: Spoke with Dori/Dr. Baugh's office and informed [facility]  Fax number to [facility] ([phone])provided to Dori.  No further needs identified.  Pt discharged home with SMITH Perkins RN    Destination      No service has been selected for the patient.      Durable Medical Equipment - Selection Complete      Service Provider Request Status Selected Services Address Phone Number Fax Number    [facility] [address] [place] [address] [phone]       [facility] 11/20/2018 1203    Current for nocturnal O2                 Dialysis/Infusion      No service has been selected for the patient.      Home Medical Care - Selection Complete      Service Provider Request Status Selected Services Address Phone Number Fax Number    [facility] [place] Selected Home Health Services [address] [address] [phone]      Community Resources      No service has been selected for the patient.             Final Discharge Disposition Code: 06 - home with home health care

## 2018-11-26 NOTE — PROGRESS NOTES
Continued Stay Note  VICKY Goldman     Patient Name: Joya Singh  MRN: [id number]  Today's Date: 11/26/2018    Admit Date: 11/19/2018    Discharge Plan     Row Name 11/26/18 1508       Plan    Final Note  Spoke with Jennie/VICKY VERDIN who states order received from pt's PCP for epogen injections and they will schedule pt.  PATIENCE Perkins RN        Discharge Codes    No documentation.       Expected Discharge Date and Time     Expected Discharge Date Expected Discharge Time    Nov 25, 2018             Susan Perkins

## 2018-11-26 NOTE — OUTREACH NOTE
Prep Survey      Responses   Facility patient discharged from?  [facility]   Is patient eligible?  Yes   Discharge diagnosis  Acute blood loss   Does the patient have one of the following disease processes/diagnoses(primary or secondary)?  Other   Does the patient have Home health ordered?  Yes   What is the Home health agency?    [facility]   Is there a DME ordered?  No   Prep survey completed?  Yes          Joie Regalado RN

## 2018-11-26 NOTE — PROGRESS NOTES
Joya Singh is a 78 y.o. female, who presents with a chief complaint of   Chief Complaint   Patient presents with   • Follow-up     HOS f/u 11/19-11/25   • Anemia       HPI   Pt has been hospitalized x 2 in the past month with anemia.  She was most recently discharged yesterday.  Her anemia is most likely multifactorial.  She has ckd, diabetes, was on eliquis for hx recurrent dvt's.  Colonoscopy and EGD neg.  She will need an outpatient capsule endoscopy per dr. Rogers.  She was started on Epogen by dr. Baugh.  the plan was to continue 20,000 units sq weekly as an outpatient.  Her eliquis was stopped.  She got 1 unit PRBC.       She has lots of left foot pain.  She saw ortho in the hospital and she is on steroids.  Her glucoses have been very high bc of the steroids.  She is taking extra sliding scale insulin for this.     Orthostatic hypotension - midodrine was started.  She thinks this is helping some.  Her dizziness is better.     She is having some muscle spasms and would like a few flexeril to help with sx.     The following portions of the patient's history were reviewed and updated as appropriate: allergies, current medications, past family history, past medical history, past social history, past surgical history and problem list.    Allergies: Lisinopril; Baclofen; Codeine; Morphine; and Penicillins    Review of Systems   Constitutional: Positive for fatigue.   HENT: Negative.    Eyes: Negative.    Respiratory: Negative.    Cardiovascular: Negative.    Gastrointestinal: Negative.    Endocrine: Negative.    Genitourinary: Negative.    Musculoskeletal: Negative.    Skin: Negative.    Allergic/Immunologic: Negative.    Neurological: Positive for weakness.   Hematological: Negative.    Psychiatric/Behavioral: Negative.    All other systems reviewed and are negative.            Wt Readings from Last 3 Encounters:   11/19/18 92 kg (202 lb 14.4 oz)   11/19/18 92.6 kg (204 lb 3 oz)   10/30/18 90 kg (198 lb  6.6 oz)     Temp Readings from Last 3 Encounters:   11/26/18 98.2 °F (36.8 °C) (Oral)   11/25/18 98 °F (36.7 °C) (Oral)   11/19/18 98.1 °F (36.7 °C)     BP Readings from Last 3 Encounters:   11/26/18 120/68   11/25/18 134/57   11/19/18 120/62     Pulse Readings from Last 3 Encounters:   11/26/18 72   11/25/18 67   11/19/18 84     Body mass index is 37.1 kg/m².  @LASTSAO2(3)@    Physical Exam   Constitutional: She is oriented to person, place, and time. She appears well-developed and well-nourished. No distress.   HENT:   Head: Normocephalic and atraumatic.   Right Ear: External ear normal.   Left Ear: External ear normal.   Nose: Nose normal.   Mouth/Throat: Oropharynx is clear and moist.   Eyes: Conjunctivae and EOM are normal. Pupils are equal, round, and reactive to light.   Neck: Normal range of motion. Neck supple.   Cardiovascular: Normal rate, regular rhythm, normal heart sounds and intact distal pulses.   Pulmonary/Chest: Effort normal and breath sounds normal. No respiratory distress. She has no wheezes.   Musculoskeletal: Normal range of motion.   Normal gait   Neurological: She is alert and oriented to person, place, and time.   Skin: Skin is warm and dry.   Psychiatric: She has a normal mood and affect. Her behavior is normal. Judgment and thought content normal.   Nursing note and vitals reviewed.      Results for orders placed or performed during the hospital encounter of 11/19/18   CBC Auto Differential   Result Value Ref Range    WBC 6.02 4.50 - 10.70 10*3/mm3    RBC 2.49 (L) 3.90 - 5.20 10*6/mm3    Hemoglobin 7.9 (L) 11.9 - 15.5 g/dL    Hematocrit 24.5 (L) 35.6 - 45.5 %    MCV 98.4 (H) 80.5 - 98.2 fL    MCH 31.7 26.9 - 32.0 pg    MCHC 32.2 (L) 32.4 - 36.3 g/dL    RDW 18.3 (H) 11.7 - 13.0 %    RDW-SD 63.2 (H) 37.0 - 54.0 fl    MPV 10.8 6.0 - 12.0 fL    Platelets 411 140 - 500 10*3/mm3    Neutrophil % 46.5 42.7 - 76.0 %    Lymphocyte % 42.2 19.6 - 45.3 %    Monocyte % 3.7 (L) 5.0 - 12.0 %     Eosinophil % 6.3 (H) 0.3 - 6.2 %    Basophil % 1.3 0.0 - 1.5 %    Immature Grans % 0.2 0.0 - 0.5 %    Neutrophils, Absolute 2.80 1.90 - 8.10 10*3/mm3    Lymphocytes, Absolute 2.54 0.90 - 4.80 10*3/mm3    Monocytes, Absolute 0.22 0.20 - 1.20 10*3/mm3    Eosinophils, Absolute 0.38 0.00 - 0.70 10*3/mm3    Basophils, Absolute 0.08 0.00 - 0.20 10*3/mm3    Immature Grans, Absolute 0.01 0.00 - 0.03 10*3/mm3    nRBC 0.0 0.0 - 0.0 /100 WBC   Iron Profile   Result Value Ref Range    Iron 178 (H) 37 - 145 mcg/dL    Iron Saturation 78 (H) 20 - 50 %    Transferrin 153 (L) 200 - 360 mg/dL    TIBC 228 mcg/dL   Vitamin B12   Result Value Ref Range    Vitamin B-12 397 211 - 946 pg/mL   Folate   Result Value Ref Range    Folate >20.00 4.78 - 24.20 ng/mL   Basic Metabolic Panel   Result Value Ref Range    Glucose 144 (H) 65 - 99 mg/dL    BUN 21 8 - 23 mg/dL    Creatinine 1.74 (H) 0.57 - 1.00 mg/dL    Sodium 144 136 - 145 mmol/L    Potassium 4.5 3.5 - 5.2 mmol/L    Chloride 104 98 - 107 mmol/L    CO2 28.3 22.0 - 29.0 mmol/L    Calcium 8.8 8.6 - 10.5 mg/dL    eGFR Non African Amer 28 (L) >60 mL/min/1.73    BUN/Creatinine Ratio 12.1 7.0 - 25.0    Anion Gap 11.7 mmol/L   Hemoglobin A1c   Result Value Ref Range    Hemoglobin A1C 7.43 (H) 4.80 - 5.60 %   Hemoglobin & Hematocrit, Blood   Result Value Ref Range    Hemoglobin 7.6 (L) 11.9 - 15.5 g/dL    Hematocrit 23.6 (L) 35.6 - 45.5 %   Reticulocytes   Result Value Ref Range    Reticulocyte % 1.86 (H) 0.50 - 1.50 %   Ferritin   Result Value Ref Range    Ferritin 338.60 (H) 13.00 - 150.00 ng/mL   Iron Profile   Result Value Ref Range    Iron 53 37 - 145 mcg/dL    Iron Saturation 24 20 - 50 %    Transferrin 146 (L) 200 - 360 mg/dL    TIBC 218 298 - 536 mcg/dL   Retic With IRF & RET-He   Result Value Ref Range    Immature Reticulocyte Fraction 18.1 (H) 0.7 - 13.7 %    Reticulocyte % 1.71 (H) 0.50 - 1.50 %    Reticulocyte Hgb 40.2 (H) 32.7 - 38.6 pg   CBC Auto Differential   Result Value Ref  Range    WBC 6.49 4.50 - 10.70 10*3/mm3    RBC 2.49 (L) 3.90 - 5.20 10*6/mm3    Hemoglobin 7.9 (L) 11.9 - 15.5 g/dL    Hematocrit 24.6 (L) 35.6 - 45.5 %    MCV 98.8 (H) 80.5 - 98.2 fL    MCH 31.7 26.9 - 32.0 pg    MCHC 32.1 (L) 32.4 - 36.3 g/dL    RDW 18.4 (H) 11.7 - 13.0 %    RDW-SD 63.7 (H) 37.0 - 54.0 fl    MPV 10.8 6.0 - 12.0 fL    Platelets 412 140 - 500 10*3/mm3   Uric Acid   Result Value Ref Range    Uric Acid 8.8 (H) 2.4 - 5.7 mg/dL   Hemoglobin & Hematocrit, Blood   Result Value Ref Range    Hemoglobin 7.6 (L) 11.9 - 15.5 g/dL    Hematocrit 24.1 (L) 35.6 - 45.5 %   C-reactive Protein   Result Value Ref Range    C-Reactive Protein 1.80 (H) 0.00 - 0.50 mg/dL   Sedimentation Rate   Result Value Ref Range    Sed Rate >140 (H) 0 - 30 mm/hr   Hemoglobin & Hematocrit, Blood   Result Value Ref Range    Hemoglobin 7.8 (L) 11.9 - 15.5 g/dL    Hematocrit 24.8 (L) 35.6 - 45.5 %   Sedimentation Rate   Result Value Ref Range    Sed Rate >140 (H) 0 - 30 mm/hr   C-reactive Protein   Result Value Ref Range    C-Reactive Protein 13.78 (H) 0.00 - 0.50 mg/dL   Basic Metabolic Panel   Result Value Ref Range    Glucose 289 (H) 65 - 99 mg/dL    BUN 20 8 - 23 mg/dL    Creatinine 1.82 (H) 0.57 - 1.00 mg/dL    Sodium 139 136 - 145 mmol/L    Potassium 4.8 3.5 - 5.2 mmol/L    Chloride 99 98 - 107 mmol/L    CO2 29.6 (H) 22.0 - 29.0 mmol/L    Calcium 8.8 8.6 - 10.5 mg/dL    eGFR Non African Amer 27 (L) >60 mL/min/1.73    BUN/Creatinine Ratio 11.0 7.0 - 25.0    Anion Gap 10.4 mmol/L   Uric Acid   Result Value Ref Range    Uric Acid 8.5 (H) 2.4 - 5.7 mg/dL   Uric Acid   Result Value Ref Range    Uric Acid 9.0 (H) 2.4 - 5.7 mg/dL   C-reactive Protein   Result Value Ref Range    C-Reactive Protein 12.47 (H) 0.00 - 0.50 mg/dL   Hemoglobin & Hematocrit, Blood   Result Value Ref Range    Hemoglobin 8.5 (L) 11.9 - 15.5 g/dL    Hematocrit 26.0 (L) 35.6 - 45.5 %   Basic Metabolic Panel   Result Value Ref Range    Glucose 230 (H) 65 - 99 mg/dL     BUN 32 (H) 8 - 23 mg/dL    Creatinine 1.64 (H) 0.57 - 1.00 mg/dL    Sodium 141 136 - 145 mmol/L    Potassium 5.0 3.5 - 5.2 mmol/L    Chloride 99 98 - 107 mmol/L    CO2 30.0 (H) 22.0 - 29.0 mmol/L    Calcium 9.5 8.6 - 10.5 mg/dL    eGFR Non African Amer 30 (L) >60 mL/min/1.73    BUN/Creatinine Ratio 19.5 7.0 - 25.0    Anion Gap 12.0 mmol/L   POC Glucose Once   Result Value Ref Range    Glucose 142 (H) 70 - 130 mg/dL   POC Glucose Once   Result Value Ref Range    Glucose 100 70 - 130 mg/dL   POC Glucose Once   Result Value Ref Range    Glucose 178 (H) 70 - 130 mg/dL   POC Glucose Once   Result Value Ref Range    Glucose 173 (H) 70 - 130 mg/dL   POC Glucose Once   Result Value Ref Range    Glucose 153 (H) 70 - 130 mg/dL   POC Glucose Once   Result Value Ref Range    Glucose 272 (H) 70 - 130 mg/dL   POC Glucose Once   Result Value Ref Range    Glucose 242 (H) 70 - 130 mg/dL   POC Glucose Once   Result Value Ref Range    Glucose 224 (H) 70 - 130 mg/dL   POC Glucose Once   Result Value Ref Range    Glucose 190 (H) 70 - 130 mg/dL   POC Glucose Once   Result Value Ref Range    Glucose 328 (H) 70 - 130 mg/dL   POC Glucose Once   Result Value Ref Range    Glucose 246 (H) 70 - 130 mg/dL   POC Glucose Once   Result Value Ref Range    Glucose 277 (H) 70 - 130 mg/dL   POC Glucose Once   Result Value Ref Range    Glucose 269 (H) 70 - 130 mg/dL   POC Glucose Once   Result Value Ref Range    Glucose 274 (H) 70 - 130 mg/dL   POC Glucose Once   Result Value Ref Range    Glucose 254 (H) 70 - 130 mg/dL   POC Glucose Once   Result Value Ref Range    Glucose 356 (H) 70 - 130 mg/dL   POC Glucose Once   Result Value Ref Range    Glucose 353 (H) 70 - 130 mg/dL   POC Glucose Once   Result Value Ref Range    Glucose 364 (H) 70 - 130 mg/dL   POC Glucose Once   Result Value Ref Range    Glucose 443 (H) 70 - 130 mg/dL   POC Glucose Once   Result Value Ref Range    Glucose 454 (C) 70 - 130 mg/dL   POC Glucose Once   Result Value Ref Range     Glucose 485 (C) 70 - 130 mg/dL   POC Glucose Once   Result Value Ref Range    Glucose 472 (C) 70 - 130 mg/dL   POC Glucose Once   Result Value Ref Range    Glucose 426 (H) 70 - 130 mg/dL   POC Glucose Once   Result Value Ref Range    Glucose 254 (H) 70 - 130 mg/dL   POC Glucose Once   Result Value Ref Range    Glucose 423 (H) 70 - 130 mg/dL   POC Glucose Once   Result Value Ref Range    Glucose 428 (H) 70 - 130 mg/dL   Manual Differential   Result Value Ref Range    Neutrophil % 62.0 42.7 - 76.0 %    Lymphocyte % 23.0 19.6 - 45.3 %    Monocyte % 4.0 (L) 5.0 - 12.0 %    Eosinophil % 9.0 (H) 0.3 - 6.2 %    Atypical Lymphocyte % 2.0 0.0 - 5.0 %    Neutrophils Absolute 4.02 1.90 - 8.10 10*3/mm3    Lymphocytes Absolute 1.49 0.90 - 4.80 10*3/mm3    Monocytes Absolute 0.26 0.20 - 1.20 10*3/mm3    Eosinophils Absolute 0.58 0.00 - 0.70 10*3/mm3    nRBC 1.0 (H) 0.0 - 0.0 /100 WBC    Anisocytosis Mod/2+ None Seen    Polychromasia Mod/2+ None Seen    WBC Morphology Normal Normal    Clumped Platelets Present None Seen     Current outpatient and discharge medications have been reconciled for the patient.  Reviewed by: Chari Mercado MD        Joya was seen today for follow-up and anemia.    Diagnoses and all orders for this visit:    Acute blood loss anemia - capsule endoscopy per dr. Rogers.   -     CBC & Differential  -     Comprehensive Metabolic Panel    Type 2 diabetes mellitus with diabetic neuropathy, with long-term current use of insulin (CMS/[facility]) - cont SSI and close monitoring of glucoses.     CKD stage 3 due to type 2 diabetes mellitus (CMS/[facility])  -     CBC & Differential  -     Comprehensive Metabolic Panel    Chronic anemia - will set up therapy plan for epogen 20,000units weekly (done today)    Orthostatic hypotension - cont midodrine    Muscle spasm - flexeril PRN.       Outpatient Medications Prior to Visit   Medication Sig Dispense Refill   • ACCU-CHEK FASTCLIX LANCETS misc TEST 3-4 TIMES DAILY AS  DIRECTED 400 each 3   • ACCU-CHEK SMARTVIEW test strip TEST blood sugar three times daily OR AS DIRECTED 300 each 3   • acetaminophen (TYLENOL) 325 MG tablet Take 2 tablets by mouth Every 6 (Six) Hours As Needed for Mild Pain . OTC product 40 tablet 0   • Alcohol Swabs (B-D SINGLE USE SWABS REGULAR) pads      • atorvastatin (LIPITOR) 10 MG tablet Take 10 mg by mouth Every Night.     • Blood Glucose Monitoring Suppl (ACCU-CHEK KENNETH SMARTVIEW) w/Device kit TEST blood sugar three times daily or as directed 1 kit 0   • DULoxetine (CYMBALTA) 30 MG capsule Take 30 mg by mouth 2 (Two) Times a Day.     • furosemide (LASIX) 20 MG tablet Take 1 tablet by mouth Daily.     • gabapentin (NEURONTIN) 400 MG capsule Take 400 mg by mouth 3 (Three) Times a Day. 400 mg AM, 400 mg NOON, 800 mg QHS     • insulin aspart (novoLOG FLEXPEN) 100 UNIT/ML solution pen-injector sc pen Inject 30 Units under the skin into the appropriate area as directed Every Morning Before Breakfast. 30 units with breakfast 35 units with lunch 35 units with dinner     • insulin aspart (novoLOG) 100 UNIT/ML injection Inject 35 Units under the skin into the appropriate area as directed Daily Before Lunch. 30 units with breakfast 35 units with lunch 35 units with dinner     • insulin aspart (novoLOG) 100 UNIT/ML injection Inject 35 Units under the skin into the appropriate area as directed Daily Before Supper. 30 units with breakfast 35 units with lunch 35 units with dinner     • Insulin Degludec (TRESIBA FLEXTOUCH) 200 UNIT/ML solution pen-injector Inject 50 Units under the skin into the appropriate area as directed every night at bedtime.     • levothyroxine (SYNTHROID) 88 MCG tablet Take 1 tablet by mouth Daily. 30 tablet 3   • MethylPREDNISolone (MEDROL, ANIL,) 4 MG tablet Take as directed on package instructions. 21 each 0   • midodrine (PROAMATINE) 2.5 MG tablet Take 1 tablet by mouth 2 (Two) Times a Day. (Patient taking differently: Take 2.5 mg by mouth 2  "(Two) Times a Day. Takes QAM and NOON) 90 tablet 1   • Needle, Disp, (BD DISP NEEDLES) 30G X 1/2\" misc To be used 3 times daily with Novolog Flexpen. 100 each 5   • O2 (OXYGEN) Inhale 2 L/min Every Night. Uses 2L  overnight only     • omeprazole (priLOSEC) 40 MG capsule Take one (1) capsule orally (by mouth) once daily 90 capsule 1   • potassium chloride (MICRO-K) 10 MEQ CR capsule Take 10 mEq by mouth Every Morning.     • promethazine (PHENERGAN) 12.5 MG tablet 1/2 to 1 tablet every 6 hours as needed for nausea 20 tablet 0   • gabapentin (NEURONTIN) 400 MG capsule Take 400 mg by mouth every night at bedtime. 400 mg AM, 400 mg NOON, 800 mg QHS       No facility-administered medications prior to visit.      No orders of the defined types were placed in this encounter.    [unfilled]  Medications Discontinued During This Encounter   Medication Reason   • gabapentin (NEURONTIN) 400 MG capsule Duplicate order         Return in about 2 weeks (around 12/10/2018) for Recheck, labs.  "

## 2018-11-27 ENCOUNTER — TELEPHONE (OUTPATIENT)
Dept: INTERNAL MEDICINE | Facility: CLINIC | Age: 78
End: 2018-11-27

## 2018-11-27 RX ORDER — DULOXETIN HYDROCHLORIDE 30 MG/1
CAPSULE, DELAYED RELEASE ORAL
Qty: 60 CAPSULE | OUTPATIENT
Start: 2018-11-27

## 2018-11-27 NOTE — TELEPHONE ENCOUNTER
----- Message from Heydi Jackson sent at 11/27/2018  2:45 PM EST -----  Regarding: HOME HEALTH AIDE  [place]    [facility]    Given verbal order for Home Health Aide once this week, twice the week of December 3 and once the week of December 10th.

## 2018-11-28 ENCOUNTER — READMISSION MANAGEMENT (OUTPATIENT)
Dept: CALL CENTER | Facility: HOSPITAL | Age: 78
End: 2018-11-28

## 2018-11-28 RX ORDER — GABAPENTIN 400 MG/1
CAPSULE ORAL
Qty: 120 CAPSULE | Refills: 5 | Status: CANCELLED | OUTPATIENT
Start: 2018-11-28

## 2018-11-28 RX ORDER — ATORVASTATIN CALCIUM 10 MG/1
TABLET, FILM COATED ORAL
Qty: 90 TABLET | Refills: 1 | Status: CANCELLED | OUTPATIENT
Start: 2018-11-28

## 2018-11-28 RX ORDER — LEVOTHYROXINE SODIUM 88 UG/1
TABLET ORAL
Qty: 90 TABLET | Refills: 1 | Status: CANCELLED | OUTPATIENT
Start: 2018-11-28

## 2018-11-28 RX ORDER — OMEPRAZOLE 40 MG/1
CAPSULE, DELAYED RELEASE ORAL
Qty: 90 CAPSULE | Refills: 1 | Status: CANCELLED | OUTPATIENT
Start: 2018-11-28

## 2018-11-28 NOTE — OUTREACH NOTE
"Medical Week 1 Survey      Responses   Facility patient discharged from?  [facility]   Does the patient have one of the following disease processes/diagnoses(primary or secondary)?  Other   Is there a successful TCM telephone encounter documented?  No   Week 1 attempt successful?  Yes   Call start time  1446   Call end time  1453   Discharge diagnosis  Acute blood loss   Meds reviewed with patient/caregiver?  Yes   Is the patient having any side effects they believe may be caused by any medication additions or changes?  No   Does the patient have all medications ordered at discharge?  Yes   Is the patient taking all medications as directed (includes completed medication regime)?  Yes   Does the patient have a primary care provider?   Yes   Does the patient have an appointment with their PCP within 7 days of discharge?  Yes   Has the patient kept scheduled appointments due by today?  Yes   What is the Home health agency?   [facility]   Has home health visited the patient within 72 hours of discharge?  Yes   Did the patient receive a copy of their discharge instructions?  Yes   Nursing interventions  Reviewed instructions with patient   What is the patient's perception of their health status since discharge?  Improving   Is the patient/caregiver able to teach back signs and symptoms related to disease process for when to call PCP?  Yes   Is the patient/caregiver able to teach back signs and symptoms related to disease process for when to call 911?  Yes   Is the patient/caregiver able to teach back the hierarchy of who to call/visit for symptoms/problems? PCP, Specialist, Home health nurse, Urgent Care, ED, 911  Yes   Additional teach back comments  Pt says she is \"feeling better\"  Says she has everything she needs. Has difficulity walking and has almost fell several time, Has alert button if she falls. She has difficulity with transportation, but uses wheels and is working on getting to all apts.    Week 1 call " completed?  Yes          Rosalinda Wade RN

## 2018-11-29 ENCOUNTER — HOSPITAL ENCOUNTER (OUTPATIENT)
Dept: INFUSION THERAPY | Facility: HOSPITAL | Age: 78
Discharge: HOME | End: 2018-11-29
Admitting: INTERNAL MEDICINE

## 2018-11-29 VITALS
HEART RATE: 82 BPM | DIASTOLIC BLOOD PRESSURE: 64 MMHG | TEMPERATURE: 98 F | BODY MASS INDEX: 37.32 KG/M2 | OXYGEN SATURATION: 94 % | WEIGHT: 202.82 LBS | RESPIRATION RATE: 16 BRPM | HEIGHT: 62 IN | SYSTOLIC BLOOD PRESSURE: 161 MMHG

## 2018-11-29 DIAGNOSIS — D62 ACUTE BLOOD LOSS ANEMIA: ICD-10-CM

## 2018-11-29 DIAGNOSIS — E11.22 CKD STAGE 3 DUE TO TYPE 2 DIABETES MELLITUS (HCC): ICD-10-CM

## 2018-11-29 DIAGNOSIS — D64.9 ANEMIA REQUIRING TRANSFUSIONS: ICD-10-CM

## 2018-11-29 DIAGNOSIS — N18.30 CKD STAGE 3 DUE TO TYPE 2 DIABETES MELLITUS (HCC): ICD-10-CM

## 2018-11-29 DIAGNOSIS — N18.30 DIABETES MELLITUS WITH STAGE 3 CHRONIC KIDNEY DISEASE (HCC): Primary | ICD-10-CM

## 2018-11-29 DIAGNOSIS — E11.22 DIABETES MELLITUS WITH STAGE 3 CHRONIC KIDNEY DISEASE (HCC): Primary | ICD-10-CM

## 2018-11-29 DIAGNOSIS — D64.9 ANEMIA, UNSPECIFIED TYPE: ICD-10-CM

## 2018-11-29 LAB
CREAT BLD-MCNC: 1.79 MG/DL (ref 0.57–1)
GFR SERPL CREATININE-BSD FRML MDRD: 28 ML/MIN/1.73
HCT VFR BLD AUTO: 25.8 % (ref 37–47)
HGB BLD-MCNC: 8.4 G/DL (ref 12–16)
POTASSIUM BLD-SCNC: 4.4 MMOL/L (ref 3.5–5.2)

## 2018-11-29 PROCEDURE — 85014 HEMATOCRIT: CPT | Performed by: INTERNAL MEDICINE

## 2018-11-29 PROCEDURE — 84132 ASSAY OF SERUM POTASSIUM: CPT | Performed by: INTERNAL MEDICINE

## 2018-11-29 PROCEDURE — 96372 THER/PROPH/DIAG INJ SC/IM: CPT

## 2018-11-29 PROCEDURE — 63510000001 EPOETIN ALFA PER 1000 UNITS: Performed by: INTERNAL MEDICINE

## 2018-11-29 PROCEDURE — 82565 ASSAY OF CREATININE: CPT | Performed by: INTERNAL MEDICINE

## 2018-11-29 PROCEDURE — 85018 HEMOGLOBIN: CPT | Performed by: INTERNAL MEDICINE

## 2018-11-29 RX ADMIN — ERYTHROPOIETIN 20000 UNITS: 20000 INJECTION, SOLUTION INTRAVENOUS; SUBCUTANEOUS at 09:21

## 2018-11-30 RX ORDER — OMEPRAZOLE 40 MG/1
CAPSULE, DELAYED RELEASE ORAL
Qty: 90 CAPSULE | Refills: 1 | Status: SHIPPED | OUTPATIENT
Start: 2018-11-30 | End: 2019-04-12 | Stop reason: SDUPTHER

## 2018-11-30 RX ORDER — GABAPENTIN 400 MG/1
CAPSULE ORAL
Qty: 120 CAPSULE | Refills: 5 | Status: SHIPPED | OUTPATIENT
Start: 2018-11-30 | End: 2019-04-12 | Stop reason: SDUPTHER

## 2018-11-30 RX ORDER — ATORVASTATIN CALCIUM 10 MG/1
10 TABLET, FILM COATED ORAL NIGHTLY
Qty: 90 TABLET | Refills: 1 | Status: SHIPPED | OUTPATIENT
Start: 2018-11-30 | End: 2019-01-18 | Stop reason: SDUPTHER

## 2018-11-30 RX ORDER — LEVOTHYROXINE SODIUM 88 UG/1
88 TABLET ORAL DAILY
Qty: 90 TABLET | Refills: 1 | Status: SHIPPED | OUTPATIENT
Start: 2018-11-30 | End: 2019-04-12 | Stop reason: SDUPTHER

## 2018-12-05 ENCOUNTER — READMISSION MANAGEMENT (OUTPATIENT)
Dept: CALL CENTER | Facility: HOSPITAL | Age: 78
End: 2018-12-05

## 2018-12-05 NOTE — OUTREACH NOTE
Medical Week 2 Survey      Responses   Facility patient discharged from?  [facility]   Does the patient have one of the following disease processes/diagnoses(primary or secondary)?  Other   Week 2 attempt successful?  Yes   Call start time  1517   Discharge diagnosis  Acute blood loss   Call end time  1520   Meds reviewed with patient/caregiver?  Yes   Is the patient having any side effects they believe may be caused by any medication additions or changes?  No   Does the patient have all medications ordered at discharge?  Yes   Is the patient taking all medications as directed (includes completed medication regime)?  Yes   Does the patient have a primary care provider?   Yes   Does the patient have an appointment with their PCP within 7 days of discharge?  Yes   Has the patient kept scheduled appointments due by today?  Yes   What is the Home health agency?   [facility]   Has home health visited the patient within 72 hours of discharge?  Yes   What DME was ordered?  Pt has CPAP and nebulizer She states she is on home 02.   Has all DME been delivered?  Yes   Psychosocial issues?  No   Comments  Dizzy still.  PT still coming but she will go for balance therapy.   Did the patient receive a copy of their discharge instructions?  Yes   Nursing interventions  Reviewed instructions with patient   What is the patient's perception of their health status since discharge?  Improving   Is the patient/caregiver able to teach back signs and symptoms related to disease process for when to call PCP?  Yes   Is the patient/caregiver able to teach back signs and symptoms related to disease process for when to call 911?  Yes   Is the patient/caregiver able to teach back the hierarchy of who to call/visit for symptoms/problems? PCP, Specialist, Home health nurse, Urgent Care, ED, 911  Yes   Additional teach back comments  She is not feeling well this week and is anxious for her bloodwork tomorrow to see what is wrong.   Week 2 Call  Completed?  Yes          Cadence Angulo RN

## 2018-12-06 ENCOUNTER — HOSPITAL ENCOUNTER (OUTPATIENT)
Dept: INFUSION THERAPY | Facility: HOSPITAL | Age: 78
Discharge: HOME | End: 2018-12-06
Admitting: INTERNAL MEDICINE

## 2018-12-06 VITALS
HEART RATE: 80 BPM | SYSTOLIC BLOOD PRESSURE: 126 MMHG | BODY MASS INDEX: 36.07 KG/M2 | HEIGHT: 62 IN | TEMPERATURE: 98.5 F | RESPIRATION RATE: 16 BRPM | WEIGHT: 196 LBS | DIASTOLIC BLOOD PRESSURE: 55 MMHG | OXYGEN SATURATION: 96 %

## 2018-12-06 DIAGNOSIS — N18.30 CKD STAGE 3 DUE TO TYPE 2 DIABETES MELLITUS (HCC): Primary | ICD-10-CM

## 2018-12-06 DIAGNOSIS — D62 ACUTE BLOOD LOSS ANEMIA: ICD-10-CM

## 2018-12-06 DIAGNOSIS — D64.9 ANEMIA REQUIRING TRANSFUSIONS: ICD-10-CM

## 2018-12-06 DIAGNOSIS — E11.22 CKD STAGE 3 DUE TO TYPE 2 DIABETES MELLITUS (HCC): Primary | ICD-10-CM

## 2018-12-06 LAB
HCT VFR BLD AUTO: 27.4 % (ref 37–47)
HGB BLD-MCNC: 8.7 G/DL (ref 12–16)

## 2018-12-06 PROCEDURE — 96372 THER/PROPH/DIAG INJ SC/IM: CPT

## 2018-12-06 PROCEDURE — 85014 HEMATOCRIT: CPT | Performed by: INTERNAL MEDICINE

## 2018-12-06 PROCEDURE — 63510000001 EPOETIN ALFA PER 1000 UNITS: Performed by: INTERNAL MEDICINE

## 2018-12-06 PROCEDURE — 85018 HEMOGLOBIN: CPT | Performed by: INTERNAL MEDICINE

## 2018-12-06 RX ADMIN — ERYTHROPOIETIN 20000 UNITS: 20000 INJECTION, SOLUTION INTRAVENOUS; SUBCUTANEOUS at 09:44

## 2018-12-06 NOTE — NURSING NOTE
NURSE PROGRESS NOTE    PT. ARRIVED @ [facility] FOR SCHEDULED INJECTION AT 0900 .  INJECTION WAS PERFORMED WITHOUT INCIDENT.  PT. DISCHARGED TO HOME AT 1000.AVS PRINTED & REVIEWED WITH PT.

## 2018-12-06 NOTE — PATIENT INSTRUCTIONS
Call Dr. Yasmani Baugh, [facility] at [phone] Epoetin Glynn injection  What is this medicine?  EPOETIN GLYNN (e CLAIRE e tin AL fa) helps your body make more red blood cells. This medicine is used to treat anemia caused by chronic kidney failure, cancer chemotherapy, or HIV-therapy. It may also be used before surgery if you have anemia.  This medicine may be used for other purposes; ask your health care provider or pharmacist if you have questions.  COMMON BRAND NAME(S): Epogen, Procrit  What should I tell my health care provider before I take this medicine?  They need to know if you have any of these conditions:  -blood clotting disorders  -cancer patient not on chemotherapy  -cystic fibrosis  -heart disease, such as angina or heart failure  -hemoglobin level of 12 g/dL or greater  -high blood pressure  -low levels of folate, iron, or vitamin B12  -seizures  -an unusual or allergic reaction to erythropoietin, albumin, benzyl alcohol, hamster proteins, other medicines, foods, dyes, or preservatives  -pregnant or trying to get pregnant  -breast-feeding  How should I use this medicine?  This medicine is for injection into a vein or under the skin. It is usually given by a health care professional in a hospital or clinic setting.  If you get this medicine at home, you will be taught how to prepare and give this medicine. Use exactly as directed. Take your medicine at regular intervals. Do not take your medicine more often than directed.  It is important that you put your used needles and syringes in a special sharps container. Do not put them in a trash can. If you do not have a sharps container, call your pharmacist or healthcare provider to get one.  A special MedGuide will be given to you by the pharmacist with each prescription and refill. Be sure to read this information carefully each time.  Talk to your pediatrician regarding the use of this medicine in children. While this drug may be  prescribed for selected conditions, precautions do apply.  Overdosage: If you think you have taken too much of this medicine contact a poison control center or emergency room at once.  NOTE: This medicine is only for you. Do not share this medicine with others.  What if I miss a dose?  If you miss a dose, take it as soon as you can. If it is almost time for your next dose, take only that dose. Do not take double or extra doses.  What may interact with this medicine?  Do not take this medicine with any of the following medications:  -darbepoetin chu  This list may not describe all possible interactions. Give your health care provider a list of all the medicines, herbs, non-prescription drugs, or dietary supplements you use. Also tell them if you smoke, drink alcohol, or use illegal drugs. Some items may interact with your medicine.  What should I watch for while using this medicine?  Your condition will be monitored carefully while you are receiving this medicine.  You may need blood work done while you are taking this medicine.  What side effects may I notice from receiving this medicine?  Side effects that you should report to your doctor or health care professional as soon as possible:  -allergic reactions like skin rash, itching or hives, swelling of the face, lips, or tongue  -breathing problems  -changes in vision  -chest pain  -confusion, trouble speaking or understanding  -feeling faint or lightheaded, falls  -high blood pressure  -muscle aches or pains  -pain, swelling, warmth in the leg  -rapid weight gain  -severe headaches  -sudden numbness or weakness of the face, arm or leg  -trouble walking, dizziness, loss of balance or coordination  -seizures (convulsions)  -swelling of the ankles, feet, hands  -unusually weak or tired  Side effects that usually do not require medical attention (report to your doctor or health care professional if they continue or are bothersome):  -diarrhea  -fever, chills  "(flu-like symptoms)  -headaches  -nausea, vomiting  -redness, stinging, or swelling at site where injected  This list may not describe all possible side effects. Call your doctor for medical advice about side effects. You may report side effects to FDA at 9-463-IKO-9713.  Where should I keep my medicine?  Keep out of the reach of children.  Store in a refrigerator between 2 and 8 degrees C (36 and 46 degrees F). Do not freeze or shake. Throw away any unused portion if using a single-dose vial. Multi-dose vials can be kept in the refrigerator for up to 21 days after the initial dose. Throw away unused medicine.  NOTE: This sheet is a summary. It may not cover all possible information. If you have questions about this medicine, talk to your doctor, pharmacist, or health care provider.  © 2018 Elsevier/Gold Standard (2017-08-07 19:42:31)   if you have any problems or concerns.    We know you have a Choice in healthcare and appreciate you using [facility].  Our purpose is to provide you \"Excellent Care\".  We hope that you will always choose us in the future and continue to recommend us to your family and friends.              "

## 2018-12-07 ENCOUNTER — TELEPHONE (OUTPATIENT)
Dept: INTERNAL MEDICINE | Facility: CLINIC | Age: 78
End: 2018-12-07

## 2018-12-07 NOTE — TELEPHONE ENCOUNTER
Luann with home health (?) called regarding patient sugars, in low 30's. Per dr burnham patient advised to hold the novalog and to just stay on the tresiba. She is coming in to f/u with Dr burnham on 12/10. Patient v/u.

## 2018-12-10 ENCOUNTER — OFFICE VISIT (OUTPATIENT)
Dept: INTERNAL MEDICINE | Facility: CLINIC | Age: 78
End: 2018-12-10

## 2018-12-10 VITALS
DIASTOLIC BLOOD PRESSURE: 64 MMHG | BODY MASS INDEX: 36.07 KG/M2 | HEART RATE: 100 BPM | WEIGHT: 196 LBS | OXYGEN SATURATION: 98 % | HEIGHT: 62 IN | SYSTOLIC BLOOD PRESSURE: 126 MMHG | RESPIRATION RATE: 18 BRPM | TEMPERATURE: 97.6 F

## 2018-12-10 DIAGNOSIS — I95.1 ORTHOSTATIC HYPOTENSION: ICD-10-CM

## 2018-12-10 DIAGNOSIS — N18.30 CKD STAGE 3 DUE TO TYPE 2 DIABETES MELLITUS (HCC): ICD-10-CM

## 2018-12-10 DIAGNOSIS — I10 ESSENTIAL HYPERTENSION: ICD-10-CM

## 2018-12-10 DIAGNOSIS — D64.9 CHRONIC ANEMIA: ICD-10-CM

## 2018-12-10 DIAGNOSIS — Z79.4 TYPE 2 DIABETES MELLITUS WITH DIABETIC NEUROPATHY, WITH LONG-TERM CURRENT USE OF INSULIN (HCC): Primary | ICD-10-CM

## 2018-12-10 DIAGNOSIS — D62 ACUTE BLOOD LOSS ANEMIA: ICD-10-CM

## 2018-12-10 DIAGNOSIS — E11.40 TYPE 2 DIABETES MELLITUS WITH DIABETIC NEUROPATHY, WITH LONG-TERM CURRENT USE OF INSULIN (HCC): Primary | ICD-10-CM

## 2018-12-10 DIAGNOSIS — E11.22 CKD STAGE 3 DUE TO TYPE 2 DIABETES MELLITUS (HCC): ICD-10-CM

## 2018-12-10 DIAGNOSIS — R42 DIZZINESS: ICD-10-CM

## 2018-12-10 PROCEDURE — 99214 OFFICE O/P EST MOD 30 MIN: CPT | Performed by: INTERNAL MEDICINE

## 2018-12-10 NOTE — PROGRESS NOTES
Joya Singh is a 78 y.o. female, who presents with a chief complaint of   Chief Complaint   Patient presents with   • Follow-up     2 week f/u, lab results    • Anemia       HPI   Pt here with her son.  She has had issues with hypoglycemia when she takes her novolog.  Her glucoses have dropped as low as the 30-40 range.  She is doing well with the tresiba.      Recent GI bleed - pt feeling better.  Pt off xarelto.   She says her left leg bothers her a little more off the blood thinner.  Her HGB last week was 8.7.  She is getting epogen shots weekly per nephrology.  She has been on warfarin in the past but had significant difficulty keeping her INR therapeutic.      Orthostatic hypotension - she feels much better on the midodrine.      Pt's shoulder pain is worse than her knee pain.  She says that if she had to have surgery she would rather have her shoulder fixed first.   She is here today only with her cane.  This is the first time I have seen her not in her wheelchair in months.       The following portions of the patient's history were reviewed and updated as appropriate: allergies, current medications, past family history, past medical history, past social history, past surgical history and problem list.    Allergies: Lisinopril; Baclofen; Codeine; Morphine; and Penicillins    Review of Systems   Constitutional: Negative.    HENT: Negative.    Eyes: Negative.    Respiratory: Negative.    Cardiovascular: Negative.    Gastrointestinal: Negative.    Endocrine: Negative.    Genitourinary: Negative.    Musculoskeletal: Negative.    Skin: Negative.    Allergic/Immunologic: Negative.    Neurological: Negative.    Hematological: Negative.    Psychiatric/Behavioral: Negative.    All other systems reviewed and are negative.            Wt Readings from Last 3 Encounters:   12/10/18 88.9 kg (196 lb)   12/06/18 88.9 kg (196 lb)   11/29/18 92 kg (202 lb 13.2 oz)     Temp Readings from Last 3 Encounters:   12/10/18 97.6 °F  (36.4 °C) (Oral)   12/06/18 98.5 °F (36.9 °C) (Temporal)   11/29/18 98 °F (36.7 °C) (Temporal)     BP Readings from Last 3 Encounters:   12/10/18 126/64   12/06/18 126/55   11/29/18 161/64     Pulse Readings from Last 3 Encounters:   12/10/18 100   12/06/18 80   11/29/18 82     Body mass index is 35.85 kg/m².  @LASTSAO2(3)@    Physical Exam   Constitutional: She is oriented to person, place, and time. She appears well-developed and well-nourished. No distress.   HENT:   Head: Normocephalic and atraumatic.   Right Ear: External ear normal.   Left Ear: External ear normal.   Nose: Nose normal.   Mouth/Throat: Oropharynx is clear and moist.   Eyes: Conjunctivae and EOM are normal. Pupils are equal, round, and reactive to light.   Neck: Normal range of motion. Neck supple.   Cardiovascular: Normal rate, regular rhythm, normal heart sounds and intact distal pulses.   Pulmonary/Chest: Effort normal and breath sounds normal. No respiratory distress. She has no wheezes.   Musculoskeletal: She exhibits edema.   Walking with cane  1+ bilat LE edema   Neurological: She is alert and oriented to person, place, and time.   Skin: Skin is warm and dry.   Psychiatric: She has a normal mood and affect. Her behavior is normal. Judgment and thought content normal.   Nursing note and vitals reviewed.      Results for orders placed or performed during the hospital encounter of 12/06/18   Hemoglobin & Hematocrit, Blood   Result Value Ref Range    Hemoglobin 8.7 (L) 12.0 - 16.0 g/dL    Hematocrit 27.4 (L) 37.0 - 47.0 %           Joya was seen today for follow-up and anemia.    Diagnoses and all orders for this visit:    Type 2 diabetes mellitus with diabetic neuropathy, with long-term current use of insulin (CMS/[facility])    CKD stage 3 due to type 2 diabetes mellitus (CMS/[facility])    Acute blood loss anemia    Chronic anemia    Orthostatic hypotension    Dizziness    Essential hypertension      Cont epo injections.  hgb stable. Pt's color improved  and she looks better overall.    Decrease novolog dosing.  Keep tresiba the same.      Cont midodrine.    OV/Labs in 1 mo      Outpatient Medications Prior to Visit   Medication Sig Dispense Refill   • ACCU-CHEK FASTCLIX LANCETS misc TEST 3-4 TIMES DAILY AS DIRECTED 400 each 3   • ACCU-CHEK SMARTVIEW test strip TEST blood sugar three times daily OR AS DIRECTED 300 each 3   • acetaminophen (TYLENOL) 325 MG tablet Take 2 tablets by mouth Every 6 (Six) Hours As Needed for Mild Pain . OTC product 40 tablet 0   • Alcohol Swabs (B-D SINGLE USE SWABS REGULAR) pads      • atorvastatin (LIPITOR) 10 MG tablet Take 1 tablet by mouth Every Night. 90 tablet 1   • Blood Glucose Monitoring Suppl (ACCU-CHEK KENNETH SMARTVIEW) w/Device kit TEST blood sugar three times daily or as directed 1 kit 0   • cyclobenzaprine (FLEXERIL) 10 MG tablet Take 1 tablet by mouth 2 (Two) Times a Day As Needed for Muscle Spasms. 30 tablet 0   • DULoxetine (CYMBALTA) 30 MG capsule Take 30 mg by mouth 2 (Two) Times a Day.     • furosemide (LASIX) 20 MG tablet Take 1 tablet by mouth Daily.     • gabapentin (NEURONTIN) 400 MG capsule 400 mg AM, 400 mg NOON, 800 mg  capsule 5   • insulin aspart (novoLOG FLEXPEN) 100 UNIT/ML solution pen-injector sc pen Inject 25 Units under the skin into the appropriate area as directed Every Morning Before Breakfast. 30 units with breakfast 35 units with lunch 35 units with dinner      • insulin aspart (novoLOG) 100 UNIT/ML injection Inject 25 Units under the skin into the appropriate area as directed Daily Before Lunch. 30 units with breakfast 35 units with lunch 35 units with dinner      • insulin aspart (novoLOG) 100 UNIT/ML injection Inject 25 Units under the skin into the appropriate area as directed Daily Before Supper. 30 units with breakfast 35 units with lunch 35 units with dinner     • Insulin Degludec (TRESIBA FLEXTOUCH) 200 UNIT/ML solution pen-injector Inject 50 Units under the skin into the appropriate  "area as directed every night at bedtime.     • levothyroxine (SYNTHROID) 88 MCG tablet Take 1 tablet by mouth Daily. 90 tablet 1   • midodrine (PROAMATINE) 2.5 MG tablet Take 1 tablet by mouth 2 (Two) Times a Day. (Patient taking differently: Take 2.5 mg by mouth 2 (Two) Times a Day. Takes QAM and NOON) 90 tablet 1   • Needle, Disp, (BD DISP NEEDLES) 30G X 1/2\" misc To be used 3 times daily with Novolog Flexpen. 100 each 5   • O2 (OXYGEN) Inhale 2 L/min Every Night. Uses 2L  overnight only     • omeprazole (priLOSEC) 40 MG capsule Take one po daily 90 capsule 1   • potassium chloride (MICRO-K) 10 MEQ CR capsule Take 10 mEq by mouth Every Morning.     • promethazine (PHENERGAN) 12.5 MG tablet 1/2 to 1 tablet every 6 hours as needed for nausea 20 tablet 0   • MethylPREDNISolone (MEDROL, ANIL,) 4 MG tablet Take as directed on package instructions. 21 each 0     No facility-administered medications prior to visit.      No orders of the defined types were placed in this encounter.    [unfilled]  Medications Discontinued During This Encounter   Medication Reason   • MethylPREDNISolone (MEDROL, ANIL,) 4 MG tablet          Return in about 1 month (around 1/10/2019).  "

## 2018-12-11 DIAGNOSIS — M62.838 MUSCLE SPASM: ICD-10-CM

## 2018-12-11 RX ORDER — CYCLOBENZAPRINE HCL 10 MG
TABLET ORAL
Qty: 30 TABLET | Refills: 0 | Status: SHIPPED | OUTPATIENT
Start: 2018-12-11 | End: 2019-01-14 | Stop reason: SDUPTHER

## 2018-12-12 ENCOUNTER — READMISSION MANAGEMENT (OUTPATIENT)
Dept: CALL CENTER | Facility: HOSPITAL | Age: 78
End: 2018-12-12

## 2018-12-12 ENCOUNTER — TELEPHONE (OUTPATIENT)
Dept: INTERNAL MEDICINE | Facility: CLINIC | Age: 78
End: 2018-12-12

## 2018-12-12 NOTE — TELEPHONE ENCOUNTER
Dr. Mrecado advised patient to drop 10 units with each meal (TID). Patient usual insulin dose 20:25:25, drop 10 units, 10:15:15. Patient voiced understanding. Advised patient to keep track of numbers and if any further questions and or concerns to contact office.     ----- Message -----  From: Claudia Corea  Sent: 12/12/2018  11:06 AM  To: Isabell Medeiros CMA  Subject: BLOOD SUGARS                                     ÁLVARO PT    Patient called to say that she has been taking her Novalog, like the nurse showed her, and her sugar has been dropping. She said that she got up at 5 am this morning, and ate oatmeal and 1/2 of a banana, and at 9 am her sugar was 44.  She said that she feels terrible.    Please call her back    Thanks!  claudia

## 2018-12-13 ENCOUNTER — HOSPITAL ENCOUNTER (OUTPATIENT)
Dept: INFUSION THERAPY | Facility: HOSPITAL | Age: 78
Discharge: HOME | End: 2018-12-13
Admitting: INTERNAL MEDICINE

## 2018-12-13 VITALS
RESPIRATION RATE: 20 BRPM | TEMPERATURE: 98.2 F | OXYGEN SATURATION: 93 % | HEIGHT: 62 IN | HEART RATE: 92 BPM | SYSTOLIC BLOOD PRESSURE: 127 MMHG | DIASTOLIC BLOOD PRESSURE: 53 MMHG | BODY MASS INDEX: 36.07 KG/M2 | WEIGHT: 195.99 LBS

## 2018-12-13 DIAGNOSIS — N18.30 CKD STAGE 3 DUE TO TYPE 2 DIABETES MELLITUS (HCC): Primary | ICD-10-CM

## 2018-12-13 DIAGNOSIS — E11.22 CKD STAGE 3 DUE TO TYPE 2 DIABETES MELLITUS (HCC): Primary | ICD-10-CM

## 2018-12-13 DIAGNOSIS — D64.9 ANEMIA REQUIRING TRANSFUSIONS: ICD-10-CM

## 2018-12-13 DIAGNOSIS — D62 ACUTE BLOOD LOSS ANEMIA: ICD-10-CM

## 2018-12-13 LAB
CREAT BLD-MCNC: 1.77 MG/DL (ref 0.57–1)
GFR SERPL CREATININE-BSD FRML MDRD: 28 ML/MIN/1.73
HCT VFR BLD AUTO: 25.3 % (ref 37–47)
HGB BLD-MCNC: 8 G/DL (ref 12–16)
POTASSIUM BLD-SCNC: 4.7 MMOL/L (ref 3.5–5.2)

## 2018-12-13 PROCEDURE — 82565 ASSAY OF CREATININE: CPT | Performed by: INTERNAL MEDICINE

## 2018-12-13 PROCEDURE — 63510000001 EPOETIN ALFA PER 1000 UNITS: Performed by: INTERNAL MEDICINE

## 2018-12-13 PROCEDURE — 84132 ASSAY OF SERUM POTASSIUM: CPT | Performed by: INTERNAL MEDICINE

## 2018-12-13 PROCEDURE — 85014 HEMATOCRIT: CPT | Performed by: INTERNAL MEDICINE

## 2018-12-13 PROCEDURE — 85018 HEMOGLOBIN: CPT | Performed by: INTERNAL MEDICINE

## 2018-12-13 PROCEDURE — 96372 THER/PROPH/DIAG INJ SC/IM: CPT

## 2018-12-13 RX ADMIN — ERYTHROPOIETIN 20000 UNITS: 20000 INJECTION, SOLUTION INTRAVENOUS; SUBCUTANEOUS at 09:20

## 2018-12-13 NOTE — PATIENT INSTRUCTIONS
Call Dr. Chari Mercado, [facility] at [phone] Epoetin Glynn injection  What is this medicine?  EPOETIN GLYNN (e CLAIRE e tin AL fa) helps your body make more red blood cells. This medicine is used to treat anemia caused by chronic kidney failure, cancer chemotherapy, or HIV-therapy. It may also be used before surgery if you have anemia.  This medicine may be used for other purposes; ask your health care provider or pharmacist if you have questions.  COMMON BRAND NAME(S): Epogen, Procrit  What should I tell my health care provider before I take this medicine?  They need to know if you have any of these conditions:  -blood clotting disorders  -cancer patient not on chemotherapy  -cystic fibrosis  -heart disease, such as angina or heart failure  -hemoglobin level of 12 g/dL or greater  -high blood pressure  -low levels of folate, iron, or vitamin B12  -seizures  -an unusual or allergic reaction to erythropoietin, albumin, benzyl alcohol, hamster proteins, other medicines, foods, dyes, or preservatives  -pregnant or trying to get pregnant  -breast-feeding  How should I use this medicine?  This medicine is for injection into a vein or under the skin. It is usually given by a health care professional in a hospital or clinic setting.  If you get this medicine at home, you will be taught how to prepare and give this medicine. Use exactly as directed. Take your medicine at regular intervals. Do not take your medicine more often than directed.  It is important that you put your used needles and syringes in a special sharps container. Do not put them in a trash can. If you do not have a sharps container, call your pharmacist or healthcare provider to get one.  A special MedGuide will be given to you by the pharmacist with each prescription and refill. Be sure to read this information carefully each time.  Talk to your pediatrician regarding the use of this medicine in children. While this drug  may be prescribed for selected conditions, precautions do apply.  Overdosage: If you think you have taken too much of this medicine contact a poison control center or emergency room at once.  NOTE: This medicine is only for you. Do not share this medicine with others.  What if I miss a dose?  If you miss a dose, take it as soon as you can. If it is almost time for your next dose, take only that dose. Do not take double or extra doses.  What may interact with this medicine?  Do not take this medicine with any of the following medications:  -darbepoetin chu  This list may not describe all possible interactions. Give your health care provider a list of all the medicines, herbs, non-prescription drugs, or dietary supplements you use. Also tell them if you smoke, drink alcohol, or use illegal drugs. Some items may interact with your medicine.  What should I watch for while using this medicine?  Your condition will be monitored carefully while you are receiving this medicine.  You may need blood work done while you are taking this medicine.  What side effects may I notice from receiving this medicine?  Side effects that you should report to your doctor or health care professional as soon as possible:  -allergic reactions like skin rash, itching or hives, swelling of the face, lips, or tongue  -breathing problems  -changes in vision  -chest pain  -confusion, trouble speaking or understanding  -feeling faint or lightheaded, falls  -high blood pressure  -muscle aches or pains  -pain, swelling, warmth in the leg  -rapid weight gain  -severe headaches  -sudden numbness or weakness of the face, arm or leg  -trouble walking, dizziness, loss of balance or coordination  -seizures (convulsions)  -swelling of the ankles, feet, hands  -unusually weak or tired  Side effects that usually do not require medical attention (report to your doctor or health care professional if they continue or are bothersome):  -diarrhea  -fever, chills  "(flu-like symptoms)  -headaches  -nausea, vomiting  -redness, stinging, or swelling at site where injected  This list may not describe all possible side effects. Call your doctor for medical advice about side effects. You may report side effects to FDA at 2-748-GBN-1401.  Where should I keep my medicine?  Keep out of the reach of children.  Store in a refrigerator between 2 and 8 degrees C (36 and 46 degrees F). Do not freeze or shake. Throw away any unused portion if using a single-dose vial. Multi-dose vials can be kept in the refrigerator for up to 21 days after the initial dose. Throw away unused medicine.  NOTE: This sheet is a summary. It may not cover all possible information. If you have questions about this medicine, talk to your doctor, pharmacist, or health care provider.  © 2018 Elsevier/Gold Standard (2017-08-07 19:42:31)   if you have any problems or concerns.    We know you have a Choice in healthcare and appreciate you using [facility].  Our purpose is to provide you \"Excellent Care\".  We hope that you will always choose us in the future and continue to recommend us to your family and friends.              "

## 2018-12-13 NOTE — NURSING NOTE
NURSE PROGRESS NOTE    PT. ARRIVED @ [facility] FOR SCHEDULED INJECTION AT 0900 .  INJECTION WAS PERFORMED WITHOUT INCIDENT.  PT. DISCHARGED TO HOME AT 0930.AVS PRINTED & REVIEWED WITH PT. & FAMILY. DISCHARGED VIA W/C.

## 2018-12-13 NOTE — OUTREACH NOTE
Medical Week 3 Survey      Responses   Facility patient discharged from?  [place]   Does the patient have one of the following disease processes/diagnoses(primary or secondary)?  Other   Week 3 attempt successful?  Yes   Call start time  1917   Call end time  1922   Discharge diagnosis  Acute blood loss   Meds reviewed with patient/caregiver?  Yes   Is the patient having any side effects they believe may be caused by any medication additions or changes?  No   Does the patient have all medications ordered at discharge?  Yes   Is the patient taking all medications as directed (includes completed medication regime)?  Yes   Medication comments  BS has been quite messed up this week per patient.  Novolog adjusted.   Does the patient have a primary care provider?   Yes   Does the patient have an appointment with their PCP within 7 days of discharge?  Yes   Has the patient kept scheduled appointments due by today?  Yes   What is the Home health agency?   [facility]   Has home health visited the patient within 72 hours of discharge?  Yes   What DME was ordered?  Pt has CPAP and nebulizer She states she is on home 02.   Psychosocial issues?  No   Comments  Balance Therapy will be January.   Did the patient receive a copy of their discharge instructions?  Yes   Nursing interventions  Reviewed instructions with patient   What is the patient's perception of their health status since discharge?  Improving   Is the patient/caregiver able to teach back signs and symptoms related to disease process for when to call PCP?  Yes   Is the patient/caregiver able to teach back signs and symptoms related to disease process for when to call 911?  Yes   Is the patient/caregiver able to teach back the hierarchy of who to call/visit for symptoms/problems? PCP, Specialist, Home health nurse, Urgent Care, ED, 911  Yes   Additional teach back comments  She is better this week.   Week 3 Call Completed?  Yes          Cadence Angulo RN

## 2018-12-14 DIAGNOSIS — R42 DIZZINESS: ICD-10-CM

## 2018-12-14 RX ORDER — MIDODRINE HYDROCHLORIDE 2.5 MG/1
TABLET ORAL
Qty: 60 TABLET | Refills: 0 | Status: SHIPPED | OUTPATIENT
Start: 2018-12-14 | End: 2019-01-18 | Stop reason: SDUPTHER

## 2018-12-14 RX ORDER — HYDROCODONE BITARTRATE AND ACETAMINOPHEN 5; 325 MG/1; MG/1
TABLET ORAL
Qty: 60 TABLET | Refills: 0 | Status: SHIPPED | OUTPATIENT
Start: 2018-12-14 | End: 2019-03-06 | Stop reason: SDUPTHER

## 2018-12-14 RX ORDER — DULOXETIN HYDROCHLORIDE 30 MG/1
CAPSULE, DELAYED RELEASE ORAL
Qty: 60 CAPSULE | OUTPATIENT
Start: 2018-12-14

## 2018-12-19 ENCOUNTER — READMISSION MANAGEMENT (OUTPATIENT)
Dept: CALL CENTER | Facility: HOSPITAL | Age: 78
End: 2018-12-19

## 2018-12-19 NOTE — OUTREACH NOTE
Medical Week 4 Survey      Responses   Facility patient discharged from?  [place]   Does the patient have one of the following disease processes/diagnoses(primary or secondary)?  Other   Week 4 attempt successful?  Yes   Call start time  1513   Call end time  1515   Discharge diagnosis  Acute blood loss   Meds reviewed with patient/caregiver?  Yes   Is the patient having any side effects they believe may be caused by any medication additions or changes?  No   Is the patient taking all medications as directed (includes completed medication regime)?  Yes   Medication comments  Pt reports blood sugards running 230   Has the patient kept scheduled appointments due by today?  Yes   Is the patient still receiving Home Health Services?  Yes   Home health comments  [facility]   Psychosocial issues?  No   What is the patient's perception of their health status since discharge?  Improving   Is the patient/caregiver able to teach back signs and symptoms related to disease process for when to call PCP?  Yes   Is the patient/caregiver able to teach back signs and symptoms related to disease process for when to call 911?  Yes   Is the patient/caregiver able to teach back the hierarchy of who to call/visit for symptoms/problems? PCP, Specialist, Home health nurse, Urgent Care, ED, 911  Yes   Week 4 Call Completed?  Yes   Would the patient like one additional call?  No   Graduated  Yes   Did the patient feel the follow up calls were helpful during their recovery period?  Yes   Was the number of calls appropriate?  Yes   Does the patient have an Advance Directive or Living Will?  Yes   Is the patient/caregiver familiar with Advance Care Planning?  Yes   Would the patient like more information on Advance Care Planning?  No          Jose Sena RN

## 2018-12-20 ENCOUNTER — HOSPITAL ENCOUNTER (OUTPATIENT)
Dept: INFUSION THERAPY | Facility: HOSPITAL | Age: 78
Discharge: HOME | End: 2018-12-20
Admitting: INTERNAL MEDICINE

## 2018-12-20 VITALS
HEIGHT: 62 IN | SYSTOLIC BLOOD PRESSURE: 137 MMHG | WEIGHT: 200 LBS | HEART RATE: 85 BPM | OXYGEN SATURATION: 94 % | RESPIRATION RATE: 16 BRPM | BODY MASS INDEX: 36.8 KG/M2 | TEMPERATURE: 99.3 F | DIASTOLIC BLOOD PRESSURE: 67 MMHG

## 2018-12-20 DIAGNOSIS — N18.30 CKD STAGE 3 DUE TO TYPE 2 DIABETES MELLITUS (HCC): Primary | ICD-10-CM

## 2018-12-20 DIAGNOSIS — D64.9 ANEMIA REQUIRING TRANSFUSIONS: ICD-10-CM

## 2018-12-20 DIAGNOSIS — D62 ACUTE BLOOD LOSS ANEMIA: ICD-10-CM

## 2018-12-20 DIAGNOSIS — E11.22 CKD STAGE 3 DUE TO TYPE 2 DIABETES MELLITUS (HCC): Primary | ICD-10-CM

## 2018-12-20 LAB
ABO GROUP BLD: NORMAL
BLD GP AB SCN SERPL QL: NEGATIVE
CREAT BLD-MCNC: 1.93 MG/DL (ref 0.57–1)
GFR SERPL CREATININE-BSD FRML MDRD: 25 ML/MIN/1.73
HCT VFR BLD AUTO: 23.1 % (ref 37–47)
HGB BLD-MCNC: 7.6 G/DL (ref 12–16)
POTASSIUM BLD-SCNC: 4.7 MMOL/L (ref 3.5–5.2)
RH BLD: POSITIVE
T&S EXPIRATION DATE: NORMAL

## 2018-12-20 PROCEDURE — 86850 RBC ANTIBODY SCREEN: CPT | Performed by: INTERNAL MEDICINE

## 2018-12-20 PROCEDURE — 85014 HEMATOCRIT: CPT | Performed by: INTERNAL MEDICINE

## 2018-12-20 PROCEDURE — 63510000001 EPOETIN ALFA PER 1000 UNITS: Performed by: INTERNAL MEDICINE

## 2018-12-20 PROCEDURE — 86900 BLOOD TYPING SEROLOGIC ABO: CPT

## 2018-12-20 PROCEDURE — A9270 NON-COVERED ITEM OR SERVICE: HCPCS | Performed by: INTERNAL MEDICINE

## 2018-12-20 PROCEDURE — 36430 TRANSFUSION BLD/BLD COMPNT: CPT

## 2018-12-20 PROCEDURE — 82565 ASSAY OF CREATININE: CPT | Performed by: INTERNAL MEDICINE

## 2018-12-20 PROCEDURE — 96372 THER/PROPH/DIAG INJ SC/IM: CPT

## 2018-12-20 PROCEDURE — 84132 ASSAY OF SERUM POTASSIUM: CPT | Performed by: INTERNAL MEDICINE

## 2018-12-20 PROCEDURE — 63710000001 DIPHENHYDRAMINE PER 50 MG: Performed by: INTERNAL MEDICINE

## 2018-12-20 PROCEDURE — 86900 BLOOD TYPING SEROLOGIC ABO: CPT | Performed by: INTERNAL MEDICINE

## 2018-12-20 PROCEDURE — 86923 COMPATIBILITY TEST ELECTRIC: CPT

## 2018-12-20 PROCEDURE — 85018 HEMOGLOBIN: CPT | Performed by: INTERNAL MEDICINE

## 2018-12-20 PROCEDURE — P9016 RBC LEUKOCYTES REDUCED: HCPCS

## 2018-12-20 PROCEDURE — 86901 BLOOD TYPING SEROLOGIC RH(D): CPT | Performed by: INTERNAL MEDICINE

## 2018-12-20 PROCEDURE — 63710000001 ACETAMINOPHEN 325 MG TABLET: Performed by: INTERNAL MEDICINE

## 2018-12-20 RX ORDER — DIPHENHYDRAMINE HCL 25 MG
25 CAPSULE ORAL ONCE
Status: COMPLETED | OUTPATIENT
Start: 2018-12-20 | End: 2018-12-20

## 2018-12-20 RX ORDER — SODIUM CHLORIDE 9 MG/ML
250 INJECTION, SOLUTION INTRAVENOUS AS NEEDED
Status: DISCONTINUED | OUTPATIENT
Start: 2018-12-20 | End: 2018-12-22 | Stop reason: HOSPADM

## 2018-12-20 RX ORDER — ACETAMINOPHEN 325 MG/1
650 TABLET ORAL ONCE
Status: COMPLETED | OUTPATIENT
Start: 2018-12-20 | End: 2018-12-20

## 2018-12-20 RX ORDER — SODIUM CHLORIDE 9 MG/ML
INJECTION, SOLUTION INTRAVENOUS
Status: COMPLETED
Start: 2018-12-20 | End: 2018-12-20

## 2018-12-20 RX ADMIN — SODIUM CHLORIDE 250 ML: 9 INJECTION, SOLUTION INTRAVENOUS at 16:56

## 2018-12-20 RX ADMIN — DIPHENHYDRAMINE HYDROCHLORIDE 25 MG: 25 CAPSULE ORAL at 14:08

## 2018-12-20 RX ADMIN — ERYTHROPOIETIN 20000 UNITS: 20000 INJECTION, SOLUTION INTRAVENOUS; SUBCUTANEOUS at 10:32

## 2018-12-20 RX ADMIN — ACETAMINOPHEN 650 MG: 325 TABLET, FILM COATED ORAL at 14:08

## 2018-12-20 NOTE — NURSING NOTE
0900 PT. TO [facility] FOR SCHEDULED PROCRIT INJECTION. H&H LOW @ 7.6 & 23.1. NOTIFIED DR. REA'S OFFICE, DR. REA NOT IN TODAY & DR. TIFFANY URBINA NOTIFIED, AWAITING RETURN CALL. 1100 2ND CALL TO PUT IN TO DR. URBINA, AWAITING RETURN CALL. PROCRIT GIVEN.PT. STATES SHE IS NOT FEELING WELL, LAYING BACK IN RECLINER, REQUEST SPRITE, C/O SOME NAUSEA.

## 2018-12-20 NOTE — PATIENT INSTRUCTIONS
Call Dr. Yasmani Baugh, [facility] at [phone]   Blood Transfusion, Adult, Care After  This sheet gives you information about how to care for yourself after your procedure. Your health care provider may also give you more specific instructions. If you have problems or questions, contact your health care provider.  What can I expect after the procedure?  After your procedure, it is common to have:  · Bruising and soreness where the IV tube was inserted.  · Headache.    Follow these instructions at home:  · Take over-the-counter and prescription medicines only as told by your health care provider.  · Return to your normal activities as told by your health care provider.  · Follow instructions from your health care provider about how to take care of your IV insertion site. Make sure you:  ? Wash your hands with soap and water before you change your bandage (dressing). If soap and water are not available, use hand .  ? Change your dressing as told by your health care provider.  · Check your IV insertion site every day for signs of infection. Check for:  ? More redness, swelling, or pain.  ? More fluid or blood.  ? Warmth.  ? Pus or a bad smell.  Contact a health care provider if:  · You have more redness, swelling, or pain around the IV insertion site.  · You have more fluid or blood coming from the IV insertion site.  · Your IV insertion site feels warm to the touch.  · You have pus or a bad smell coming from the IV insertion site.  · Your urine turns pink, red, or brown.  · You feel weak after doing your normal activities.  Get help right away if:  · You have signs of a serious allergic or immune system reaction, including:  ? Itchiness.  ? Hives.  ? Trouble breathing.  ? Anxiety.  ? Chest or lower back pain.  ? Fever, flushing, and chills.  ? Rapid pulse.  ? Rash.  ? Diarrhea.  ? Vomiting.  ? Dark urine.  ? Serious headache.  ? Dizziness.  ? Stiff neck.  ? Yellow coloration of the  "face or the white parts of the eyes (jaundice).  This information is not intended to replace advice given to you by your health care provider. Make sure you discuss any questions you have with your health care provider.  Document Released: 01/08/2016 Document Revised: 08/16/2017 Document Reviewed: 07/03/2017  [employer] Interactive Patient Education © 2018 [employer]   if you have any problems or concerns.    We know you have a Choice in healthcare and appreciate you using [facility].  Our purpose is to provide you \"Excellent Care\".  We hope that you will always choose us in the future and continue to recommend us to your family and friends.              "

## 2018-12-20 NOTE — NURSING NOTE
1425 1 UNIT PRBC'S HUNG, PT. DENIES C/O EXCEPT FOR FEELING TIRED & SHORT OF BREATH. O2 SAT 89-90% ON RA, OXYGEN STARTED @ 2L NC, AS PT. WEARS O2 @ HOME @ NIGHT & PRN. SON @ BS.

## 2018-12-21 LAB
ABO + RH BLD: NORMAL
BH BB BLOOD EXPIRATION DATE: NORMAL
BH BB BLOOD TYPE BARCODE: 7300
BH BB DISPENSE STATUS: NORMAL
BH BB PRODUCT CODE: NORMAL
BH BB UNIT NUMBER: NORMAL
UNIT  ABO: NORMAL
UNIT  RH: NORMAL

## 2018-12-26 RX ORDER — DULOXETIN HYDROCHLORIDE 30 MG/1
30 CAPSULE, DELAYED RELEASE ORAL 2 TIMES DAILY
Qty: 60 CAPSULE | Refills: 5 | Status: SHIPPED | OUTPATIENT
Start: 2018-12-26 | End: 2019-05-29 | Stop reason: SDUPTHER

## 2018-12-26 RX ORDER — DULOXETIN HYDROCHLORIDE 30 MG/1
CAPSULE, DELAYED RELEASE ORAL
Qty: 60 CAPSULE | Status: CANCELLED | OUTPATIENT
Start: 2018-12-26

## 2018-12-27 ENCOUNTER — HOSPITAL ENCOUNTER (OUTPATIENT)
Dept: INFUSION THERAPY | Facility: HOSPITAL | Age: 78
Discharge: HOME | End: 2018-12-27
Admitting: INTERNAL MEDICINE

## 2018-12-27 VITALS
OXYGEN SATURATION: 91 % | HEART RATE: 90 BPM | RESPIRATION RATE: 16 BRPM | DIASTOLIC BLOOD PRESSURE: 57 MMHG | TEMPERATURE: 97.7 F | SYSTOLIC BLOOD PRESSURE: 125 MMHG

## 2018-12-27 DIAGNOSIS — D62 ACUTE BLOOD LOSS ANEMIA: ICD-10-CM

## 2018-12-27 DIAGNOSIS — D64.9 ANEMIA REQUIRING TRANSFUSIONS: ICD-10-CM

## 2018-12-27 DIAGNOSIS — N18.30 CKD STAGE 3 DUE TO TYPE 2 DIABETES MELLITUS (HCC): Primary | ICD-10-CM

## 2018-12-27 DIAGNOSIS — E11.22 CKD STAGE 3 DUE TO TYPE 2 DIABETES MELLITUS (HCC): Primary | ICD-10-CM

## 2018-12-27 LAB
CREAT BLD-MCNC: 1.91 MG/DL (ref 0.57–1)
GFR SERPL CREATININE-BSD FRML MDRD: 26 ML/MIN/1.73
HCT VFR BLD AUTO: 27.3 % (ref 37–47)
HGB BLD-MCNC: 8.9 G/DL (ref 12–16)
POTASSIUM BLD-SCNC: 4.8 MMOL/L (ref 3.5–5.2)

## 2018-12-27 PROCEDURE — 85014 HEMATOCRIT: CPT | Performed by: INTERNAL MEDICINE

## 2018-12-27 PROCEDURE — 85018 HEMOGLOBIN: CPT | Performed by: INTERNAL MEDICINE

## 2018-12-27 PROCEDURE — 36415 COLL VENOUS BLD VENIPUNCTURE: CPT

## 2018-12-27 PROCEDURE — 84132 ASSAY OF SERUM POTASSIUM: CPT | Performed by: INTERNAL MEDICINE

## 2018-12-27 PROCEDURE — 63510000001 EPOETIN ALFA PER 1000 UNITS

## 2018-12-27 PROCEDURE — 96372 THER/PROPH/DIAG INJ SC/IM: CPT

## 2018-12-27 PROCEDURE — 82565 ASSAY OF CREATININE: CPT | Performed by: INTERNAL MEDICINE

## 2018-12-27 RX ADMIN — ERYTHROPOIETIN 20000 UNITS: 20000 INJECTION, SOLUTION INTRAVENOUS; SUBCUTANEOUS at 09:33

## 2018-12-27 NOTE — NURSING NOTE
NURSE PROGRESS NOTE    PT. ARRIVED @ [facility] FOR SCHEDULED INJECTION AT 0900 .  INJECTION WAS PERFORMED WITHOUT INCIDENT.  PT. DISCHARGED TO HOME AT 0950.AVS PRINTED & REVIEWED WITH PT.

## 2018-12-27 NOTE — PATIENT INSTRUCTIONS
Call Dr. Chari Mercado, [facility] at [phone] Epoetin Glynn injection  What is this medicine?  EPOETIN GLYNN (e CLAIRE e tin AL fa) helps your body make more red blood cells. This medicine is used to treat anemia caused by chronic kidney failure, cancer chemotherapy, or HIV-therapy. It may also be used before surgery if you have anemia.  This medicine may be used for other purposes; ask your health care provider or pharmacist if you have questions.  COMMON BRAND NAME(S): Epogen, Procrit  What should I tell my health care provider before I take this medicine?  They need to know if you have any of these conditions:  -blood clotting disorders  -cancer patient not on chemotherapy  -cystic fibrosis  -heart disease, such as angina or heart failure  -hemoglobin level of 12 g/dL or greater  -high blood pressure  -low levels of folate, iron, or vitamin B12  -seizures  -an unusual or allergic reaction to erythropoietin, albumin, benzyl alcohol, hamster proteins, other medicines, foods, dyes, or preservatives  -pregnant or trying to get pregnant  -breast-feeding  How should I use this medicine?  This medicine is for injection into a vein or under the skin. It is usually given by a health care professional in a hospital or clinic setting.  If you get this medicine at home, you will be taught how to prepare and give this medicine. Use exactly as directed. Take your medicine at regular intervals. Do not take your medicine more often than directed.  It is important that you put your used needles and syringes in a special sharps container. Do not put them in a trash can. If you do not have a sharps container, call your pharmacist or healthcare provider to get one.  A special MedGuide will be given to you by the pharmacist with each prescription and refill. Be sure to read this information carefully each time.  Talk to your pediatrician regarding the use of this medicine in children. While this drug  may be prescribed for selected conditions, precautions do apply.  Overdosage: If you think you have taken too much of this medicine contact a poison control center or emergency room at once.  NOTE: This medicine is only for you. Do not share this medicine with others.  What if I miss a dose?  If you miss a dose, take it as soon as you can. If it is almost time for your next dose, take only that dose. Do not take double or extra doses.  What may interact with this medicine?  Do not take this medicine with any of the following medications:  -darbepoetin chu  This list may not describe all possible interactions. Give your health care provider a list of all the medicines, herbs, non-prescription drugs, or dietary supplements you use. Also tell them if you smoke, drink alcohol, or use illegal drugs. Some items may interact with your medicine.  What should I watch for while using this medicine?  Your condition will be monitored carefully while you are receiving this medicine.  You may need blood work done while you are taking this medicine.  What side effects may I notice from receiving this medicine?  Side effects that you should report to your doctor or health care professional as soon as possible:  -allergic reactions like skin rash, itching or hives, swelling of the face, lips, or tongue  -breathing problems  -changes in vision  -chest pain  -confusion, trouble speaking or understanding  -feeling faint or lightheaded, falls  -high blood pressure  -muscle aches or pains  -pain, swelling, warmth in the leg  -rapid weight gain  -severe headaches  -sudden numbness or weakness of the face, arm or leg  -trouble walking, dizziness, loss of balance or coordination  -seizures (convulsions)  -swelling of the ankles, feet, hands  -unusually weak or tired  Side effects that usually do not require medical attention (report to your doctor or health care professional if they continue or are bothersome):  -diarrhea  -fever, chills  "(flu-like symptoms)  -headaches  -nausea, vomiting  -redness, stinging, or swelling at site where injected  This list may not describe all possible side effects. Call your doctor for medical advice about side effects. You may report side effects to FDA at 1-246-ZZG-4999.  Where should I keep my medicine?  Keep out of the reach of children.  Store in a refrigerator between 2 and 8 degrees C (36 and 46 degrees F). Do not freeze or shake. Throw away any unused portion if using a single-dose vial. Multi-dose vials can be kept in the refrigerator for up to 21 days after the initial dose. Throw away unused medicine.  NOTE: This sheet is a summary. It may not cover all possible information. If you have questions about this medicine, talk to your doctor, pharmacist, or health care provider.  © 2018 Elsevier/Gold Standard (2017-08-07 19:42:31)   if you have any problems or concerns.    We know you have a Choice in healthcare and appreciate you using [facility].  Our purpose is to provide you \"Excellent Care\".  We hope that you will always choose us in the future and continue to recommend us to your family and friends.              "

## 2019-01-03 ENCOUNTER — HOSPITAL ENCOUNTER (OUTPATIENT)
Dept: INFUSION THERAPY | Facility: HOSPITAL | Age: 79
Discharge: HOME | End: 2019-01-03
Admitting: INTERNAL MEDICINE

## 2019-01-03 VITALS
HEIGHT: 62 IN | SYSTOLIC BLOOD PRESSURE: 126 MMHG | BODY MASS INDEX: 36.07 KG/M2 | OXYGEN SATURATION: 94 % | DIASTOLIC BLOOD PRESSURE: 57 MMHG | TEMPERATURE: 98.9 F | HEART RATE: 85 BPM | WEIGHT: 196 LBS | RESPIRATION RATE: 16 BRPM

## 2019-01-03 DIAGNOSIS — E11.22 CKD STAGE 3 DUE TO TYPE 2 DIABETES MELLITUS (HCC): Primary | ICD-10-CM

## 2019-01-03 DIAGNOSIS — D62 ACUTE BLOOD LOSS ANEMIA: ICD-10-CM

## 2019-01-03 DIAGNOSIS — D64.9 ANEMIA REQUIRING TRANSFUSIONS: ICD-10-CM

## 2019-01-03 DIAGNOSIS — N18.30 CKD STAGE 3 DUE TO TYPE 2 DIABETES MELLITUS (HCC): Primary | ICD-10-CM

## 2019-01-03 LAB
CREAT BLD-MCNC: 1.74 MG/DL (ref 0.57–1)
GFR SERPL CREATININE-BSD FRML MDRD: 28 ML/MIN/1.73
HCT VFR BLD AUTO: 25.3 % (ref 37–47)
HGB BLD-MCNC: 8.4 G/DL (ref 12–16)
POTASSIUM BLD-SCNC: 4.6 MMOL/L (ref 3.5–5.2)

## 2019-01-03 PROCEDURE — 85018 HEMOGLOBIN: CPT | Performed by: INTERNAL MEDICINE

## 2019-01-03 PROCEDURE — 85014 HEMATOCRIT: CPT | Performed by: INTERNAL MEDICINE

## 2019-01-03 PROCEDURE — 84132 ASSAY OF SERUM POTASSIUM: CPT | Performed by: INTERNAL MEDICINE

## 2019-01-03 PROCEDURE — 63510000001 EPOETIN ALFA PER 1000 UNITS: Performed by: INTERNAL MEDICINE

## 2019-01-03 PROCEDURE — 96372 THER/PROPH/DIAG INJ SC/IM: CPT

## 2019-01-03 PROCEDURE — 82565 ASSAY OF CREATININE: CPT | Performed by: INTERNAL MEDICINE

## 2019-01-03 RX ADMIN — ERYTHROPOIETIN 20000 UNITS: 20000 INJECTION, SOLUTION INTRAVENOUS; SUBCUTANEOUS at 09:28

## 2019-01-03 NOTE — NURSING NOTE
NURSE PROGRESS NOTE    PT. ARRIVED @ [facility] FOR SCHEDULED INJECTION AT 0900 .  INJECTION WAS PERFORMED WITHOUT INCIDENT.  PT. DISCHARGED TO HOME AT 0935.AVS PRINTED & REVIEWED WITH PT. DISCHARGED AMBULATORY

## 2019-01-03 NOTE — PATIENT INSTRUCTIONS
"  Call Dr. Chari Mercado, [facility] at [phone] if you have any problems or concerns.    We know you have a Choice in healthcare and appreciate you using [facility].  Our purpose is to provide you \"Excellent Care\".  We hope that you will always choose us in the future and continue to recommend us to your family and friends.          Denosumab injection  What is this medicine?  DENOSUMAB (den oh bebo mab) slows bone breakdown. Prolia is used to treat osteoporosis in women after menopause and in men. Xgeva is used to treat a high calcium level due to cancer and to prevent bone fractures and other bone problems caused by multiple myeloma or cancer bone metastases. Xgeva is also used to treat giant cell tumor of the bone.  This medicine may be used for other purposes; ask your health care provider or pharmacist if you have questions.  COMMON BRAND NAME(S): Prolia, XGEVA  What should I tell my health care provider before I take this medicine?  They need to know if you have any of these conditions:  -dental disease  -having surgery or tooth extraction  -infection  -kidney disease  -low levels of calcium or Vitamin D in the blood  -malnutrition  -on hemodialysis  -skin conditions or sensitivity  -thyroid or parathyroid disease  -an unusual reaction to denosumab, other medicines, foods, dyes, or preservatives  -pregnant or trying to get pregnant  -breast-feeding  How should I use this medicine?  This medicine is for injection under the skin. It is given by a health care professional in a hospital or clinic setting.  If you are getting Prolia, a special MedGuide will be given to you by the pharmacist with each prescription and refill. Be sure to read this information carefully each time.  For Prolia, talk to your pediatrician regarding the use of this medicine in children. Special care may be needed. For Xgeva, talk to your pediatrician regarding the use of this medicine in " children. While this drug may be prescribed for children as young as 13 years for selected conditions, precautions do apply.  Overdosage: If you think you have taken too much of this medicine contact a poison control center or emergency room at once.  NOTE: This medicine is only for you. Do not share this medicine with others.  What if I miss a dose?  It is important not to miss your dose. Call your doctor or health care professional if you are unable to keep an appointment.  What may interact with this medicine?  Do not take this medicine with any of the following medications:  -other medicines containing denosumab  This medicine may also interact with the following medications:  -medicines that lower your chance of fighting infection  -steroid medicines like prednisone or cortisone  This list may not describe all possible interactions. Give your health care provider a list of all the medicines, herbs, non-prescription drugs, or dietary supplements you use. Also tell them if you smoke, drink alcohol, or use illegal drugs. Some items may interact with your medicine.  What should I watch for while using this medicine?  Visit your doctor or health care professional for regular checks on your progress. Your doctor or health care professional may order blood tests and other tests to see how you are doing.  Call your doctor or health care professional for advice if you get a fever, chills or sore throat, or other symptoms of a cold or flu. Do not treat yourself. This drug may decrease your body's ability to fight infection. Try to avoid being around people who are sick.  You should make sure you get enough calcium and vitamin D while you are taking this medicine, unless your doctor tells you not to. Discuss the foods you eat and the vitamins you take with your health care professional.  See your dentist regularly. Brush and floss your teeth as directed. Before you have any dental work done, tell your dentist you are  receiving this medicine.  Do not become pregnant while taking this medicine or for 5 months after stopping it. Talk with your doctor or health care professional about your birth control options while taking this medicine. Women should inform their doctor if they wish to become pregnant or think they might be pregnant. There is a potential for serious side effects to an unborn child. Talk to your health care professional or pharmacist for more information.  What side effects may I notice from receiving this medicine?  Side effects that you should report to your doctor or health care professional as soon as possible:  -allergic reactions like skin rash, itching or hives, swelling of the face, lips, or tongue  -bone pain  -breathing problems  -dizziness  -jaw pain, especially after dental work  -redness, blistering, peeling of the skin  -signs and symptoms of infection like fever or chills; cough; sore throat; pain or trouble passing urine  -signs of low calcium like fast heartbeat, muscle cramps or muscle pain; pain, tingling, numbness in the hands or feet; seizures  -unusual bleeding or bruising  -unusually weak or tired  Side effects that usually do not require medical attention (report to your doctor or health care professional if they continue or are bothersome):  -constipation  -diarrhea  -headache  -joint pain  -loss of appetite  -muscle pain  -runny nose  -tiredness  -upset stomach  This list may not describe all possible side effects. Call your doctor for medical advice about side effects. You may report side effects to FDA at 3-383-FDA-8899.  Where should I keep my medicine?  This medicine is only given in a clinic, doctor's office, or other health care setting and will not be stored at home.  NOTE: This sheet is a summary. It may not cover all possible information. If you have questions about this medicine, talk to your doctor, pharmacist, or health care provider.  © 2018 Elsevier/Gold Standard (2018-01-09  19:17:21)

## 2019-01-08 NOTE — PATIENT INSTRUCTIONS
Call Dr. Chari Mercado, [facility] at [phone] Epoetin Glynn injection  What is this medicine?  EPOETIN GLYNN (e CLAIRE e tin AL fa) helps your body make more red blood cells. This medicine is used to treat anemia caused by chronic kidney failure, cancer chemotherapy, or HIV-therapy. It may also be used before surgery if you have anemia.  This medicine may be used for other purposes; ask your health care provider or pharmacist if you have questions.  COMMON BRAND NAME(S): Epogen, Procrit  What should I tell my health care provider before I take this medicine?  They need to know if you have any of these conditions:  -blood clotting disorders  -cancer patient not on chemotherapy  -cystic fibrosis  -heart disease, such as angina or heart failure  -hemoglobin level of 12 g/dL or greater  -high blood pressure  -low levels of folate, iron, or vitamin B12  -seizures  -an unusual or allergic reaction to erythropoietin, albumin, benzyl alcohol, hamster proteins, other medicines, foods, dyes, or preservatives  -pregnant or trying to get pregnant  -breast-feeding  How should I use this medicine?  This medicine is for injection into a vein or under the skin. It is usually given by a health care professional in a hospital or clinic setting.  If you get this medicine at home, you will be taught how to prepare and give this medicine. Use exactly as directed. Take your medicine at regular intervals. Do not take your medicine more often than directed.  It is important that you put your used needles and syringes in a special sharps container. Do not put them in a trash can. If you do not have a sharps container, call your pharmacist or healthcare provider to get one.  A special MedGuide will be given to you by the pharmacist with each prescription and refill. Be sure to read this information carefully each time.  Talk to your pediatrician regarding the use of this medicine in children. While this drug  may be prescribed for selected conditions, precautions do apply.  Overdosage: If you think you have taken too much of this medicine contact a poison control center or emergency room at once.  NOTE: This medicine is only for you. Do not share this medicine with others.  What if I miss a dose?  If you miss a dose, take it as soon as you can. If it is almost time for your next dose, take only that dose. Do not take double or extra doses.  What may interact with this medicine?  Do not take this medicine with any of the following medications:  -darbepoetin chu  This list may not describe all possible interactions. Give your health care provider a list of all the medicines, herbs, non-prescription drugs, or dietary supplements you use. Also tell them if you smoke, drink alcohol, or use illegal drugs. Some items may interact with your medicine.  What should I watch for while using this medicine?  Your condition will be monitored carefully while you are receiving this medicine.  You may need blood work done while you are taking this medicine.  What side effects may I notice from receiving this medicine?  Side effects that you should report to your doctor or health care professional as soon as possible:  -allergic reactions like skin rash, itching or hives, swelling of the face, lips, or tongue  -breathing problems  -changes in vision  -chest pain  -confusion, trouble speaking or understanding  -feeling faint or lightheaded, falls  -high blood pressure  -muscle aches or pains  -pain, swelling, warmth in the leg  -rapid weight gain  -severe headaches  -sudden numbness or weakness of the face, arm or leg  -trouble walking, dizziness, loss of balance or coordination  -seizures (convulsions)  -swelling of the ankles, feet, hands  -unusually weak or tired  Side effects that usually do not require medical attention (report to your doctor or health care professional if they continue or are bothersome):  -diarrhea  -fever, chills  "(flu-like symptoms)  -headaches  -nausea, vomiting  -redness, stinging, or swelling at site where injected  This list may not describe all possible side effects. Call your doctor for medical advice about side effects. You may report side effects to FDA at 0-147-IUV-9817.  Where should I keep my medicine?  Keep out of the reach of children.  Store in a refrigerator between 2 and 8 degrees C (36 and 46 degrees F). Do not freeze or shake. Throw away any unused portion if using a single-dose vial. Multi-dose vials can be kept in the refrigerator for up to 21 days after the initial dose. Throw away unused medicine.  NOTE: This sheet is a summary. It may not cover all possible information. If you have questions about this medicine, talk to your doctor, pharmacist, or health care provider.  © 2018 Elsevier/Gold Standard (2017-08-07 19:42:31)   if you have any problems or concerns.    We know you have a Choice in healthcare and appreciate you using [facility].  Our purpose is to provide you \"Excellent Care\".  We hope that you will always choose us in the future and continue to recommend us to your family and friends.                "

## 2019-01-10 ENCOUNTER — HOSPITAL ENCOUNTER (OUTPATIENT)
Dept: INFUSION THERAPY | Facility: HOSPITAL | Age: 79
Discharge: HOME | End: 2019-01-10
Admitting: INTERNAL MEDICINE

## 2019-01-10 VITALS
BODY MASS INDEX: 36.8 KG/M2 | TEMPERATURE: 98.6 F | WEIGHT: 200 LBS | HEIGHT: 62 IN | SYSTOLIC BLOOD PRESSURE: 133 MMHG | HEART RATE: 82 BPM | OXYGEN SATURATION: 98 % | DIASTOLIC BLOOD PRESSURE: 46 MMHG | RESPIRATION RATE: 16 BRPM

## 2019-01-10 DIAGNOSIS — D64.9 ANEMIA REQUIRING TRANSFUSIONS: ICD-10-CM

## 2019-01-10 DIAGNOSIS — D62 ACUTE BLOOD LOSS ANEMIA: ICD-10-CM

## 2019-01-10 DIAGNOSIS — N18.30 CKD STAGE 3 DUE TO TYPE 2 DIABETES MELLITUS (HCC): Primary | ICD-10-CM

## 2019-01-10 DIAGNOSIS — E11.22 CKD STAGE 3 DUE TO TYPE 2 DIABETES MELLITUS (HCC): Primary | ICD-10-CM

## 2019-01-10 LAB
CREAT BLD-MCNC: 1.87 MG/DL (ref 0.57–1)
GFR SERPL CREATININE-BSD FRML MDRD: 26 ML/MIN/1.73
HCT VFR BLD AUTO: 24.6 % (ref 37–47)
HGB BLD-MCNC: 8.1 G/DL (ref 12–16)
POTASSIUM BLD-SCNC: 4.8 MMOL/L (ref 3.5–5.2)

## 2019-01-10 PROCEDURE — 85018 HEMOGLOBIN: CPT | Performed by: INTERNAL MEDICINE

## 2019-01-10 PROCEDURE — 96372 THER/PROPH/DIAG INJ SC/IM: CPT

## 2019-01-10 PROCEDURE — 84132 ASSAY OF SERUM POTASSIUM: CPT | Performed by: INTERNAL MEDICINE

## 2019-01-10 PROCEDURE — 63510000001 EPOETIN ALFA PER 1000 UNITS: Performed by: INTERNAL MEDICINE

## 2019-01-10 PROCEDURE — 85014 HEMATOCRIT: CPT | Performed by: INTERNAL MEDICINE

## 2019-01-10 PROCEDURE — 82565 ASSAY OF CREATININE: CPT | Performed by: INTERNAL MEDICINE

## 2019-01-10 RX ADMIN — ERYTHROPOIETIN 20000 UNITS: 20000 INJECTION, SOLUTION INTRAVENOUS; SUBCUTANEOUS at 09:43

## 2019-01-10 NOTE — NURSING NOTE
NURSE PROGRESS NOTE    PT. ARRIVED @ [facility] FOR SCHEDULED INJECTION AT 0907 .  INJECTION WAS PERFORMED WITHOUT INCIDENT.  PT. DISCHARGED TO HOME AT 0948.PT. LABS NOT IMPROVING, WILL NOTIFY DR. REA VIA MESSAGE IN [employer]. DISCHARGED AMBULATORY WITH SON.

## 2019-01-14 DIAGNOSIS — M62.838 MUSCLE SPASM: ICD-10-CM

## 2019-01-14 RX ORDER — CYCLOBENZAPRINE HCL 10 MG
TABLET ORAL
Qty: 30 TABLET | Refills: 0 | Status: SHIPPED | OUTPATIENT
Start: 2019-01-14 | End: 2019-09-11 | Stop reason: SDUPTHER

## 2019-01-17 ENCOUNTER — HOSPITAL ENCOUNTER (OUTPATIENT)
Dept: INFUSION THERAPY | Facility: HOSPITAL | Age: 79
Discharge: HOME | End: 2019-01-17
Admitting: INTERNAL MEDICINE

## 2019-01-17 VITALS
HEART RATE: 74 BPM | BODY MASS INDEX: 36.8 KG/M2 | TEMPERATURE: 99.3 F | DIASTOLIC BLOOD PRESSURE: 58 MMHG | OXYGEN SATURATION: 90 % | RESPIRATION RATE: 16 BRPM | SYSTOLIC BLOOD PRESSURE: 144 MMHG | WEIGHT: 199.96 LBS | HEIGHT: 62 IN

## 2019-01-17 DIAGNOSIS — D64.9 ANEMIA REQUIRING TRANSFUSIONS: ICD-10-CM

## 2019-01-17 DIAGNOSIS — N18.30 CKD STAGE 3 DUE TO TYPE 2 DIABETES MELLITUS (HCC): Primary | ICD-10-CM

## 2019-01-17 DIAGNOSIS — E11.22 CKD STAGE 3 DUE TO TYPE 2 DIABETES MELLITUS (HCC): Primary | ICD-10-CM

## 2019-01-17 DIAGNOSIS — D62 ACUTE BLOOD LOSS ANEMIA: ICD-10-CM

## 2019-01-17 LAB
ABO GROUP BLD: NORMAL
ANION GAP SERPL CALCULATED.3IONS-SCNC: 9.6 MMOL/L
ANISOCYTOSIS BLD QL: NORMAL
BASOPHILS # BLD AUTO: 0.08 10*3/MM3 (ref 0–0.2)
BASOPHILS NFR BLD AUTO: 1.5 % (ref 0–2)
BILIRUB UR QL STRIP: NEGATIVE
BLD GP AB SCN SERPL QL: NEGATIVE
BUN BLD-MCNC: 27 MG/DL (ref 8–23)
BUN/CREAT SERPL: 14.1 (ref 7–25)
CALCIUM SPEC-SCNC: 8.8 MG/DL (ref 8.8–10.5)
CHLORIDE SERPL-SCNC: 104 MMOL/L (ref 98–107)
CLARITY UR: ABNORMAL
CO2 SERPL-SCNC: 26.4 MMOL/L (ref 22–29)
COLOR UR: YELLOW
CREAT BLD-MCNC: 1.91 MG/DL (ref 0.57–1)
DEPRECATED RDW RBC AUTO: 87.9 FL (ref 37–54)
EOSINOPHIL # BLD AUTO: 0.36 10*3/MM3 (ref 0.1–0.3)
EOSINOPHIL NFR BLD AUTO: 6.6 % (ref 0–4)
ERYTHROCYTE [DISTWIDTH] IN BLOOD BY AUTOMATED COUNT: 22.4 % (ref 11.5–14.5)
FERRITIN SERPL-MCNC: 352 NG/ML (ref 13–150)
GFR SERPL CREATININE-BSD FRML MDRD: 26 ML/MIN/1.73
GLUCOSE BLD-MCNC: 171 MG/DL (ref 65–99)
GLUCOSE UR STRIP-MCNC: NEGATIVE MG/DL
HCT VFR BLD AUTO: 21.8 % (ref 37–47)
HGB BLD-MCNC: 7.2 G/DL (ref 12–16)
HGB UR QL STRIP.AUTO: ABNORMAL
IMM GRANULOCYTES # BLD AUTO: 0.04 10*3/MM3 (ref 0–0.03)
IMM GRANULOCYTES NFR BLD AUTO: 0.7 % (ref 0–0.5)
IRON 24H UR-MRATE: 123 MCG/DL (ref 37–145)
IRON SATN MFR SERPL: 68 %
KETONES UR QL STRIP: NEGATIVE
LEUKOCYTE ESTERASE UR QL STRIP.AUTO: ABNORMAL
LYMPHOCYTES # BLD AUTO: 2.01 10*3/MM3 (ref 0.6–4.8)
LYMPHOCYTES NFR BLD AUTO: 37 % (ref 20–45)
MACROCYTES BLD QL SMEAR: NORMAL
MCH RBC QN AUTO: 36 PG (ref 27–31)
MCHC RBC AUTO-ENTMCNC: 33 G/DL (ref 31–37)
MCV RBC AUTO: 109 FL (ref 81–99)
MONOCYTES # BLD AUTO: 0.25 10*3/MM3 (ref 0–1)
MONOCYTES NFR BLD AUTO: 4.6 % (ref 3–8)
NEUTROPHILS # BLD AUTO: 2.69 10*3/MM3 (ref 1.5–8.3)
NEUTROPHILS NFR BLD AUTO: 49.6 % (ref 45–70)
NITRITE UR QL STRIP: POSITIVE
NRBC BLD AUTO-RTO: 0 /100 WBC (ref 0–0)
PH UR STRIP.AUTO: 6 [PH] (ref 4.5–8)
PLATELET # BLD AUTO: 383 10*3/MM3 (ref 140–500)
PMV BLD AUTO: 11.3 FL (ref 7.4–10.4)
POTASSIUM BLD-SCNC: 5 MMOL/L (ref 3.5–5.2)
PROT UR QL STRIP: ABNORMAL
RBC # BLD AUTO: 2 10*6/MM3 (ref 4.2–5.4)
RH BLD: POSITIVE
SMALL PLATELETS BLD QL SMEAR: ADEQUATE
SODIUM BLD-SCNC: 140 MMOL/L (ref 136–145)
SP GR UR STRIP: 1.01 (ref 1–1.03)
T&S EXPIRATION DATE: NORMAL
TIBC SERPL-MCNC: 181 MCG/DL
UIBC SERPL-MCNC: 58 MCG/DL (ref 112–346)
UROBILINOGEN UR QL STRIP: ABNORMAL
WBC MORPH BLD: NORMAL
WBC NRBC COR # BLD: 5.43 10*3/MM3 (ref 4.8–10.8)

## 2019-01-17 PROCEDURE — 83550 IRON BINDING TEST: CPT | Performed by: INTERNAL MEDICINE

## 2019-01-17 PROCEDURE — 86901 BLOOD TYPING SEROLOGIC RH(D): CPT | Performed by: INTERNAL MEDICINE

## 2019-01-17 PROCEDURE — 63510000001 EPOETIN ALFA PER 1000 UNITS: Performed by: INTERNAL MEDICINE

## 2019-01-17 PROCEDURE — 36430 TRANSFUSION BLD/BLD COMPNT: CPT

## 2019-01-17 PROCEDURE — A9270 NON-COVERED ITEM OR SERVICE: HCPCS | Performed by: INTERNAL MEDICINE

## 2019-01-17 PROCEDURE — 85007 BL SMEAR W/DIFF WBC COUNT: CPT | Performed by: INTERNAL MEDICINE

## 2019-01-17 PROCEDURE — 86900 BLOOD TYPING SEROLOGIC ABO: CPT

## 2019-01-17 PROCEDURE — P9016 RBC LEUKOCYTES REDUCED: HCPCS

## 2019-01-17 PROCEDURE — 86923 COMPATIBILITY TEST ELECTRIC: CPT

## 2019-01-17 PROCEDURE — 82728 ASSAY OF FERRITIN: CPT | Performed by: INTERNAL MEDICINE

## 2019-01-17 PROCEDURE — 80048 BASIC METABOLIC PNL TOTAL CA: CPT | Performed by: INTERNAL MEDICINE

## 2019-01-17 PROCEDURE — 83540 ASSAY OF IRON: CPT | Performed by: INTERNAL MEDICINE

## 2019-01-17 PROCEDURE — 81003 URINALYSIS AUTO W/O SCOPE: CPT | Performed by: INTERNAL MEDICINE

## 2019-01-17 PROCEDURE — 86900 BLOOD TYPING SEROLOGIC ABO: CPT | Performed by: INTERNAL MEDICINE

## 2019-01-17 PROCEDURE — 85025 COMPLETE CBC W/AUTO DIFF WBC: CPT | Performed by: INTERNAL MEDICINE

## 2019-01-17 PROCEDURE — 96372 THER/PROPH/DIAG INJ SC/IM: CPT

## 2019-01-17 PROCEDURE — 36415 COLL VENOUS BLD VENIPUNCTURE: CPT

## 2019-01-17 PROCEDURE — 63710000001 ACETAMINOPHEN 325 MG TABLET: Performed by: INTERNAL MEDICINE

## 2019-01-17 PROCEDURE — 86850 RBC ANTIBODY SCREEN: CPT | Performed by: INTERNAL MEDICINE

## 2019-01-17 PROCEDURE — 63710000001 DIPHENHYDRAMINE PER 50 MG: Performed by: INTERNAL MEDICINE

## 2019-01-17 RX ORDER — DIPHENHYDRAMINE HCL 25 MG
25 CAPSULE ORAL EVERY 6 HOURS PRN
COMMUNITY
End: 2021-01-01

## 2019-01-17 RX ORDER — ACETAMINOPHEN 325 MG/1
650 TABLET ORAL ONCE
Status: COMPLETED | OUTPATIENT
Start: 2019-01-17 | End: 2019-01-17

## 2019-01-17 RX ORDER — SODIUM CHLORIDE 9 MG/ML
INJECTION, SOLUTION INTRAVENOUS
Status: COMPLETED
Start: 2019-01-17 | End: 2019-01-17

## 2019-01-17 RX ORDER — SODIUM CHLORIDE 9 MG/ML
250 INJECTION, SOLUTION INTRAVENOUS AS NEEDED
Status: DISCONTINUED | OUTPATIENT
Start: 2019-01-17 | End: 2019-01-19 | Stop reason: HOSPADM

## 2019-01-17 RX ORDER — DIPHENHYDRAMINE HCL 25 MG
25 CAPSULE ORAL ONCE
Status: COMPLETED | OUTPATIENT
Start: 2019-01-17 | End: 2019-01-17

## 2019-01-17 RX ADMIN — SODIUM CHLORIDE 250 ML: 9 INJECTION, SOLUTION INTRAVENOUS at 15:39

## 2019-01-17 RX ADMIN — ERYTHROPOIETIN 20000 UNITS: 20000 INJECTION, SOLUTION INTRAVENOUS; SUBCUTANEOUS at 11:13

## 2019-01-17 RX ADMIN — ACETAMINOPHEN 650 MG: 325 TABLET, FILM COATED ORAL at 12:39

## 2019-01-17 RX ADMIN — DIPHENHYDRAMINE HYDROCHLORIDE 25 MG: 25 CAPSULE ORAL at 12:38

## 2019-01-17 NOTE — PATIENT INSTRUCTIONS
Call Dr. Yasmani Baugh, [facility] at [phone]   Blood Transfusion, Adult, Care After  This sheet gives you information about how to care for yourself after your procedure. Your health care provider may also give you more specific instructions. If you have problems or questions, contact your health care provider.  What can I expect after the procedure?  After your procedure, it is common to have:  · Bruising and soreness where the IV tube was inserted.  · Headache.    Follow these instructions at home:  · Take over-the-counter and prescription medicines only as told by your health care provider.  · Return to your normal activities as told by your health care provider.  · Follow instructions from your health care provider about how to take care of your IV insertion site. Make sure you:  ? Wash your hands with soap and water before you change your bandage (dressing). If soap and water are not available, use hand .  ? Change your dressing as told by your health care provider.  · Check your IV insertion site every day for signs of infection. Check for:  ? More redness, swelling, or pain.  ? More fluid or blood.  ? Warmth.  ? Pus or a bad smell.  Contact a health care provider if:  · You have more redness, swelling, or pain around the IV insertion site.  · You have more fluid or blood coming from the IV insertion site.  · Your IV insertion site feels warm to the touch.  · You have pus or a bad smell coming from the IV insertion site.  · Your urine turns pink, red, or brown.  · You feel weak after doing your normal activities.  Get help right away if:  · You have signs of a serious allergic or immune system reaction, including:  ? Itchiness.  ? Hives.  ? Trouble breathing.  ? Anxiety.  ? Chest or lower back pain.  ? Fever, flushing, and chills.  ? Rapid pulse.  ? Rash.  ? Diarrhea.  ? Vomiting.  ? Dark urine.  ? Serious headache.  ? Dizziness.  ? Stiff neck.  ? Yellow coloration of the  "face or the white parts of the eyes (jaundice).  This information is not intended to replace advice given to you by your health care provider. Make sure you discuss any questions you have with your health care provider.  Document Released: 01/08/2016 Document Revised: 08/16/2017 Document Reviewed: 07/03/2017  [employer] Interactive Patient Education © 2018 [employer]   if you have any problems or concerns.    We know you have a Choice in healthcare and appreciate you using [facility].  Our purpose is to provide you \"Excellent Care\".  We hope that you will always choose us in the future and continue to recommend us to your family and friends.              "

## 2019-01-17 NOTE — NURSING NOTE
0900 PT. HERE FOR SCHEDULED PROCRIT INJECTION. PT. HAS BEEN RECEIVING PROCRIT WEEKLY SINCE 12/2018, CALL TO DR. URBINA'S OFFICE & ORDER FOR MORE LAB WORK RECEIVED.0950 SOME OF LABS BACK, H&H LOW, CALL BACK OUT TO DR. URBINA, AWAITING RETURN CALL.

## 2019-01-18 DIAGNOSIS — R42 DIZZINESS: ICD-10-CM

## 2019-01-18 RX ORDER — ATORVASTATIN CALCIUM 10 MG/1
TABLET, FILM COATED ORAL
Qty: 90 TABLET | Refills: 1 | Status: SHIPPED | OUTPATIENT
Start: 2019-01-18 | End: 2019-08-19 | Stop reason: SDUPTHER

## 2019-01-21 RX ORDER — MIDODRINE HYDROCHLORIDE 2.5 MG/1
TABLET ORAL
Qty: 60 TABLET | Refills: 5 | Status: SHIPPED | OUTPATIENT
Start: 2019-01-21 | End: 2019-03-29

## 2019-01-24 ENCOUNTER — HOSPITAL ENCOUNTER (OUTPATIENT)
Dept: INFUSION THERAPY | Facility: HOSPITAL | Age: 79
Discharge: HOME | End: 2019-01-24
Admitting: INTERNAL MEDICINE

## 2019-01-24 VITALS
OXYGEN SATURATION: 94 % | HEIGHT: 62 IN | BODY MASS INDEX: 36.8 KG/M2 | WEIGHT: 199.96 LBS | SYSTOLIC BLOOD PRESSURE: 129 MMHG | RESPIRATION RATE: 16 BRPM | HEART RATE: 86 BPM | TEMPERATURE: 98.5 F | DIASTOLIC BLOOD PRESSURE: 58 MMHG

## 2019-01-24 DIAGNOSIS — N18.30 CKD STAGE 3 DUE TO TYPE 2 DIABETES MELLITUS (HCC): ICD-10-CM

## 2019-01-24 DIAGNOSIS — D62 ACUTE BLOOD LOSS ANEMIA: ICD-10-CM

## 2019-01-24 DIAGNOSIS — E11.22 CKD STAGE 3 DUE TO TYPE 2 DIABETES MELLITUS (HCC): ICD-10-CM

## 2019-01-24 DIAGNOSIS — D64.9 ANEMIA REQUIRING TRANSFUSIONS: Primary | ICD-10-CM

## 2019-01-24 LAB
HCT VFR BLD AUTO: 27.9 % (ref 37–47)
HGB BLD-MCNC: 9.2 G/DL (ref 12–16)

## 2019-01-24 PROCEDURE — 96372 THER/PROPH/DIAG INJ SC/IM: CPT

## 2019-01-24 PROCEDURE — 85014 HEMATOCRIT: CPT | Performed by: INTERNAL MEDICINE

## 2019-01-24 PROCEDURE — 85018 HEMOGLOBIN: CPT | Performed by: INTERNAL MEDICINE

## 2019-01-24 PROCEDURE — 63510000001 EPOETIN ALFA PER 1000 UNITS

## 2019-01-24 RX ADMIN — ERYTHROPOIETIN 20000 UNITS: 20000 INJECTION, SOLUTION INTRAVENOUS; SUBCUTANEOUS at 09:17

## 2019-01-24 NOTE — NURSING NOTE
NURSING PROGRESS NOTE:    PATIENT ARRIVED AMBULATORY TO [facility] AT 0840 FOR SCHEDULED INJECTION.  HGB=9.2, HCT=27.9.  PROCEDURE PERFORMED WITHOUT INCIDENT. AVS REVIEWED WITH PATIENT AND A COPY PROVIDED.  DISCHARGED HOME AT 0932 WITH HER SON.  ANISH DALAL

## 2019-01-24 NOTE — PATIENT INSTRUCTIONS
"  Call Dr. Yasmani Baugh, [facility] at [phone] if you have any problems or concerns.    We know you have a Choice in healthcare and appreciate you using [facility].  Our purpose is to provide you \"Excellent Care\".  We hope that you will always choose us in the future and continue to recommend us to your family and friends.              "

## 2019-01-29 ENCOUNTER — CONSULT (OUTPATIENT)
Dept: ONCOLOGY | Facility: CLINIC | Age: 79
End: 2019-01-29

## 2019-01-29 ENCOUNTER — APPOINTMENT (OUTPATIENT)
Dept: LAB | Facility: HOSPITAL | Age: 79
End: 2019-01-29

## 2019-01-29 VITALS
OXYGEN SATURATION: 97 % | BODY MASS INDEX: 36.78 KG/M2 | RESPIRATION RATE: 16 BRPM | DIASTOLIC BLOOD PRESSURE: 73 MMHG | TEMPERATURE: 97.6 F | SYSTOLIC BLOOD PRESSURE: 126 MMHG | HEART RATE: 80 BPM | HEIGHT: 62 IN | WEIGHT: 199.9 LBS

## 2019-01-29 DIAGNOSIS — D64.9 CHRONIC ANEMIA: Primary | ICD-10-CM

## 2019-01-29 LAB
BASOPHILS # BLD AUTO: 0.1 10*3/MM3 (ref 0–0.2)
BASOPHILS NFR BLD AUTO: 1.7 % (ref 0–2)
DEPRECATED RDW RBC AUTO: 87.6 FL (ref 37–54)
EOSINOPHIL # BLD AUTO: 0.38 10*3/MM3 (ref 0.1–0.3)
EOSINOPHIL NFR BLD AUTO: 6.5 % (ref 0–4)
ERYTHROCYTE [DISTWIDTH] IN BLOOD BY AUTOMATED COUNT: 22.3 % (ref 11.5–14.5)
ERYTHROCYTE [SEDIMENTATION RATE] IN BLOOD: 58 MM/HR (ref 0–20)
FOLATE SERPL-MCNC: >20 NG/ML (ref 4.78–20)
HAPTOGLOB SERPL-MCNC: 64 MG/DL (ref 30–200)
HCT VFR BLD AUTO: 27.4 % (ref 37–47)
HGB BLD-MCNC: 9 G/DL (ref 12–16)
HGB RETIC QN AUTO: 37.1 PG (ref 29.8–36.1)
IMM GRANULOCYTES # BLD AUTO: 0.03 10*3/MM3 (ref 0–0.03)
IMM GRANULOCYTES NFR BLD AUTO: 0.5 % (ref 0–0.5)
IMM RETICS NFR: 21 % (ref 3–15.8)
LDH SERPL-CCNC: 171 U/L (ref 135–214)
LYMPHOCYTES # BLD AUTO: 2.38 10*3/MM3 (ref 0.6–4.8)
LYMPHOCYTES NFR BLD AUTO: 40.8 % (ref 20–45)
MCH RBC QN AUTO: 35.7 PG (ref 27–31)
MCHC RBC AUTO-ENTMCNC: 32.8 G/DL (ref 31–37)
MCV RBC AUTO: 108.7 FL (ref 81–99)
MONOCYTES # BLD AUTO: 0.26 10*3/MM3 (ref 0–1)
MONOCYTES NFR BLD AUTO: 4.5 % (ref 3–8)
NEUTROPHILS # BLD AUTO: 2.69 10*3/MM3 (ref 1.5–8.3)
NEUTROPHILS NFR BLD AUTO: 46 % (ref 45–70)
NRBC BLD AUTO-RTO: 0 /100 WBC (ref 0–0)
PLATELET # BLD AUTO: 374 10*3/MM3 (ref 140–500)
PMV BLD AUTO: 11.5 FL (ref 7.4–10.4)
RBC # BLD AUTO: 2.52 10*6/MM3 (ref 4.2–5.4)
RETICS/RBC NFR AUTO: 3.72 % (ref 0.5–1.5)
VIT B12 BLD-MCNC: 517 PG/ML (ref 232–1245)
WBC NRBC COR # BLD: 5.84 10*3/MM3 (ref 4.8–10.8)

## 2019-01-29 PROCEDURE — 86334 IMMUNOFIX E-PHORESIS SERUM: CPT | Performed by: INTERNAL MEDICINE

## 2019-01-29 PROCEDURE — 82607 VITAMIN B-12: CPT | Performed by: INTERNAL MEDICINE

## 2019-01-29 PROCEDURE — 84165 PROTEIN E-PHORESIS SERUM: CPT | Performed by: INTERNAL MEDICINE

## 2019-01-29 PROCEDURE — 36415 COLL VENOUS BLD VENIPUNCTURE: CPT | Performed by: INTERNAL MEDICINE

## 2019-01-29 PROCEDURE — 85025 COMPLETE CBC W/AUTO DIFF WBC: CPT | Performed by: INTERNAL MEDICINE

## 2019-01-29 PROCEDURE — 83010 ASSAY OF HAPTOGLOBIN QUANT: CPT | Performed by: INTERNAL MEDICINE

## 2019-01-29 PROCEDURE — 83615 LACTATE (LD) (LDH) ENZYME: CPT | Performed by: INTERNAL MEDICINE

## 2019-01-29 PROCEDURE — 82746 ASSAY OF FOLIC ACID SERUM: CPT | Performed by: INTERNAL MEDICINE

## 2019-01-29 PROCEDURE — 84155 ASSAY OF PROTEIN SERUM: CPT | Performed by: INTERNAL MEDICINE

## 2019-01-29 PROCEDURE — 99205 OFFICE O/P NEW HI 60 MIN: CPT | Performed by: INTERNAL MEDICINE

## 2019-01-29 PROCEDURE — 85046 RETICYTE/HGB CONCENTRATE: CPT | Performed by: INTERNAL MEDICINE

## 2019-01-29 PROCEDURE — 82784 ASSAY IGA/IGD/IGG/IGM EACH: CPT | Performed by: INTERNAL MEDICINE

## 2019-01-29 PROCEDURE — 83883 ASSAY NEPHELOMETRY NOT SPEC: CPT | Performed by: INTERNAL MEDICINE

## 2019-01-29 PROCEDURE — 85652 RBC SED RATE AUTOMATED: CPT | Performed by: INTERNAL MEDICINE

## 2019-01-29 NOTE — PROGRESS NOTES
Subjective     REASON FOR CONSULTATION:  anemia  Provide an opinion on any further workup or treatment                             REQUESTING PHYSICIAN:  Dr. Baugh    RECORDS OBTAINED:  Records of the patients history including those obtained from the referring provider were reviewed and summarized in detail.    History of Present Illness   This is a very pleasant 78-year-old woman seen today for evaluation of anemia.  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  She has stage III chronic kidney disease followed by Dr. Garza of nephrology.  Reviewing her records, the patient has had anemia present since at least 2014 but her hemoglobin has worsened over the past 6 months.  The patient was admitted to the hospital in October 2018 with symptomatic anemia, shortness of breath and lightheadedness.  She was found to have hemoglobin of 7.0.  There was concern for GI blood loss although EGD and colonoscopy performed showed no obvious etiology of blood loss.  The patient states that she was transfused 7 units of packed red blood cells during the hospital stay.  I do not see any Hemoccult results.  She was previously on Eliquis which was discontinued.  Since discharge in October, the patient has been receiving weekly Procrit 20,000 units in the ACU at [facility], but despite Procrit she has required transfusion on 2 separate occasions.  She is not seeing any bright red blood per rectum or melena.  She complains of fatigue and lightheadedness.    Recent iron profile on 1/17/19 was inconsistent with iron deficiency with an iron saturation 68% and the ferritin was 352.  The red blood cells are macrocytic.  White blood cell and platelet counts have been normal.    Past Medical History:   Diagnosis Date   • Abnormal urination    • Allergic rhinitis    • Anemia    • Anxiety    • Appetite absent    • Arthritis    • Back pain    • Bell's palsy    •  Black tarry stools    • Blood in stool    • CKD (chronic kidney disease) stage 3, GFR 30-59 ml/min (CMS/[facility])    • Cough    • Depression    • Diabetes mellitus (CMS/[facility])     LAST A1C 6   • Diabetic gastroparesis (CMS/[facility]) 2/19/2016   • Difficulty walking    • Dizziness    • Excessive urination at night    • Fatigue    • Fever, low grade    • Frequent urination    • GERD (gastroesophageal reflux disease)    • GI bleed    • H/O blood clots     LEFT LEG 7 OR 8 YEARS AGO   • Heat intolerance    • History of transfusion    • Hyperlipidemia    • Hypertension    • Hypothyroidism    • Nausea & vomiting    • Normal coronary arteries     by cath 2013   • AARON (obstructive sleep apnea)     DOESNT WEAR REGULARLY   • PONV (postoperative nausea and vomiting)    • Skin cancer    • Stroke (CMS/[facility])     Several mini-strokes   • Swelling    • TIA (transient ischemic attack)     LAST TIA JULY 2017   • Urination pain    • UTI (urinary tract infection)     Dec 2018 and Jan 2019   • Weight loss         Past Surgical History:   Procedure Laterality Date   • BACK SURGERY      HARDWARE   • CHOLECYSTECTOMY      OPEN   • COLONOSCOPY  2011    due for repeat in 2021   • COLONOSCOPY N/A 10/28/2018    Procedure: COLONOSCOPY;  Surgeon: Emmanuel Rogers MD;  Location: [facility];  Service: Gastroenterology   • ENDOSCOPY N/A 10/26/2018    Procedure: ESOPHAGOGASTRODUODENOSCOPY;  Surgeon: Emmanuel Rogers MD;  Location: [facility];  Service: Gastroenterology   • HYSTERECTOMY      PARTIAL    • NECK SURGERY     • SPINE SURGERY     • TUMOR REMOVAL Left     Leg   • UPPER GASTROINTESTINAL ENDOSCOPY  2014    gastritis.  done by dr. hastings        Current Outpatient Medications on File Prior to Visit   Medication Sig Dispense Refill   • ACCU-CHEK FASTCLIX LANCETS misc TEST 3-4 TIMES DAILY AS DIRECTED 400 each 3   • ACCU-CHEK SMARTVIEW test strip TEST blood sugar three times daily OR AS DIRECTED 300 each 3   • acetaminophen (TYLENOL) 325 MG  tablet Take 2 tablets by mouth Every 6 (Six) Hours As Needed for Mild Pain . OTC product 40 tablet 0   • Alcohol Swabs (B-D SINGLE USE SWABS REGULAR) pads      • apixaban (ELIQUIS) 2.5 MG tablet tablet Take 2.5 mg by mouth 2 (Two) Times a Day.     • atorvastatin (LIPITOR) 10 MG tablet TAKE ONE TABLET BY MOUTH AT BEDTIME 90 tablet 1   • Blood Glucose Monitoring Suppl (ACCU-CHEK KENNETH SMARTVIEW) w/Device kit TEST blood sugar three times daily or as directed 1 kit 0   • cyclobenzaprine (FLEXERIL) 10 MG tablet TAKE ONE TABLET BY MOUTH TWICE DAILY as needed for muscle spasms 30 tablet 0   • diphenhydrAMINE (BENADRYL) 25 mg capsule Take 25 mg by mouth Every 6 (Six) Hours As Needed for Itching.     • DULoxetine (CYMBALTA) 30 MG capsule Take 1 capsule by mouth 2 (Two) Times a Day. 60 capsule 5   • furosemide (LASIX) 20 MG tablet Take 1 tablet by mouth Daily.     • gabapentin (NEURONTIN) 400 MG capsule 400 mg AM, 400 mg NOON, 800 mg  capsule 5   • HYDROcodone-acetaminophen (NORCO) 5-325 MG per tablet TAKE ONE (1) TABLET ORALLY (BY MOUTH) EVERY 12 HOURS AS NEEDED FOR MODERATE PAIN 60 tablet 0   • insulin aspart (novoLOG FLEXPEN) 100 UNIT/ML solution pen-injector sc pen Inject 25 Units under the skin into the appropriate area as directed Every Morning Before Breakfast. 30 units with breakfast 35 units with lunch 35 units with dinner      • insulin aspart (novoLOG) 100 UNIT/ML injection Inject 25 Units under the skin into the appropriate area as directed Daily Before Lunch. 30 units with breakfast 35 units with lunch 35 units with dinner      • insulin aspart (novoLOG) 100 UNIT/ML injection Inject 25 Units under the skin into the appropriate area as directed Daily Before Supper. 30 units with breakfast 35 units with lunch 35 units with dinner     • Insulin Degludec (TRESIBA FLEXTOUCH) 200 UNIT/ML solution pen-injector Inject 50 Units under the skin into the appropriate area as directed every night at bedtime.     •  "levothyroxine (SYNTHROID) 88 MCG tablet Take 1 tablet by mouth Daily. 90 tablet 1   • midodrine (PROAMATINE) 2.5 MG tablet TAKE ONE TABLET BY MOUTH EVERY MORNING AND ONE tablet SIX hours LATER 60 tablet 5   • Multiple Vitamins-Minerals (CENTRUM SILVER PO) Take  by mouth Daily.     • Needle, Disp, (BD DISP NEEDLES) 30G X 1/2\" misc To be used 3 times daily with Novolog Flexpen. 100 each 5   • O2 (OXYGEN) Inhale 2 L/min Every Night. Uses 2L  overnight only     • omeprazole (priLOSEC) 40 MG capsule Take one po daily 90 capsule 1   • potassium chloride (MICRO-K) 10 MEQ CR capsule Take 10 mEq by mouth Every Morning.     • promethazine (PHENERGAN) 12.5 MG tablet 1/2 to 1 tablet every 6 hours as needed for nausea 20 tablet 0     No current facility-administered medications on file prior to visit.         ALLERGIES:    Allergies   Allergen Reactions   • Lisinopril Cough   • Baclofen Anxiety     Panic attack, nightmares   • Codeine Itching and Rash   • Morphine Hives   • Penicillins Rash     Tolerates cephalosporins         Social History     Socioeconomic History   • Marital status:      Spouse name: Not on file   • Number of children: Not on file   • Years of education: Not on file   • Highest education level: Not on file   Tobacco Use   • Smoking status: Never Smoker   • Smokeless tobacco: Never Used   • Tobacco comment: CAFFEINE USE: NONE   Substance and Sexual Activity   • Alcohol use: No   • Drug use: No   • Sexual activity: Defer     Comment: EXERCISE - RARELY        Family History   Problem Relation Age of Onset   • Lupus Mother    • Heart failure Mother 59   • Heart disease Other    • Hypertension Other    • Heart attack Father         Review of Systems   Constitutional: Positive for activity change, fatigue, fever and unexpected weight change. Negative for appetite change.   HENT: Negative.    Respiratory: Positive for cough and shortness of breath. Negative for apnea.    Cardiovascular: Negative.  " "  Gastrointestinal: Positive for nausea and vomiting. Negative for abdominal pain and blood in stool.   Genitourinary: Positive for frequency and urgency.   Musculoskeletal: Positive for arthralgias, back pain and gait problem. Negative for neck stiffness.   Skin: Negative.    Neurological: Positive for dizziness and numbness. Negative for tremors and speech difficulty.   Hematological: Negative.    Psychiatric/Behavioral: Negative.          Objective     Vitals:    01/29/19 1112   BP: 126/73   Pulse: 80   Resp: 16   Temp: 97.6 °F (36.4 °C)   TempSrc: Oral   SpO2: 97%   Weight: 90.7 kg (199 lb 14.4 oz)   Height: 157.5 cm (62.01\")   PainSc:   9   PainLoc: Knee  Comment: knee and shoulder pain     Current Status 1/29/2019   ECOG score 1       Physical Exam    CON: pleasant well-developed somewhat chronic ill appearing woman  HEENT: no icterus, no thrush, moist membranes  NECK: no jvd  LYMPH: no cervical or supraclavicular lad  CV: RRR, S1S2, no murmur  RESP: cta bilat, no wheezing, no rales  GI: soft, non-tender, no splenomegaly, +bs, obese  MUSC: no edema, ambulates with cane  NEURO: alert and oriented x3, normal strength  PSYCH: normal mood    RECENT LABS:  Hematology WBC   Date Value Ref Range Status   01/17/2019 5.43 4.80 - 10.80 10*3/mm3 Final   11/26/2018 7.63 4.80 - 10.80 10*3/mm3 Final     RBC   Date Value Ref Range Status   01/17/2019 2.00 (L) 4.20 - 5.40 10*6/mm3 Final   11/26/2018 2.67 (L) 4.20 - 5.40 10*6/mm3 Final     Hemoglobin   Date Value Ref Range Status   01/24/2019 9.2 (L) 12.0 - 16.0 g/dL Final     Hematocrit   Date Value Ref Range Status   01/24/2019 27.9 (L) 37.0 - 47.0 % Final     Platelets   Date Value Ref Range Status   01/17/2019 383 140 - 500 10*3/mm3 Final        CT abdomen/pelvis 10/26/18 reviewed and showed no evidence of cirrhosis or splenomegaly.    Assessment/Plan     1.  Macrocytic anemia: This nice lady has chronic anemia which has worsened over the past 6 months.  There is no " associated leukopenia or thrombocytopenia.  Red blood cell indices are macrocytic.  She has been evaluated for GI blood loss with colonoscopy and EGD with no obvious etiology.  Her iron levels have not been low which argues against GI blood loss.  She does have stage III chronic kidney disease and has been receiving Procrit weekly since November 2018 but despite Procrit has required blood transfusion on 2 occasions.  Degree of anemia seems out of proportion to stage III chronic kidney disease.  Her inflammatory markers were dramatically elevated in November.  She is not seeing bright red blood per rectum or melena.    To further evaluate, I have ordered a folic acid, B12, serum protein electrophoresis, immunofixation, free light chain ratio, sedimentation rate, reticulocyte count, LDH, haptoglobin.  I will see the patient back in 1-2 weeks to review results.  Pending results, a bone marrow exam could be considered to evaluate macrocytic anemia.  The patient seemed agreeable to this if required.  Also, I would consider increasing her Procrit dose per Medicare guidelines-protocol given the continued transfusion requirements.    Thank you for allowing me to participate in the care of Ms. Singh.

## 2019-01-30 ENCOUNTER — HOSPITAL ENCOUNTER (OUTPATIENT)
Dept: INFUSION THERAPY | Facility: HOSPITAL | Age: 79
Discharge: HOME | End: 2019-01-30
Admitting: INTERNAL MEDICINE

## 2019-01-30 ENCOUNTER — OFFICE VISIT (OUTPATIENT)
Dept: INTERNAL MEDICINE | Facility: CLINIC | Age: 79
End: 2019-01-30

## 2019-01-30 VITALS
TEMPERATURE: 97.6 F | HEIGHT: 62 IN | DIASTOLIC BLOOD PRESSURE: 48 MMHG | BODY MASS INDEX: 36.8 KG/M2 | OXYGEN SATURATION: 90 % | HEART RATE: 83 BPM | WEIGHT: 199.96 LBS | RESPIRATION RATE: 16 BRPM | SYSTOLIC BLOOD PRESSURE: 120 MMHG

## 2019-01-30 VITALS
RESPIRATION RATE: 16 BRPM | WEIGHT: 199.6 LBS | SYSTOLIC BLOOD PRESSURE: 130 MMHG | HEIGHT: 62 IN | BODY MASS INDEX: 36.73 KG/M2 | HEART RATE: 102 BPM | OXYGEN SATURATION: 100 % | DIASTOLIC BLOOD PRESSURE: 60 MMHG | TEMPERATURE: 97.8 F

## 2019-01-30 DIAGNOSIS — N18.30 CKD STAGE 3 DUE TO TYPE 2 DIABETES MELLITUS (HCC): ICD-10-CM

## 2019-01-30 DIAGNOSIS — D64.9 ANEMIA REQUIRING TRANSFUSIONS: Primary | ICD-10-CM

## 2019-01-30 DIAGNOSIS — D62 ACUTE BLOOD LOSS ANEMIA: ICD-10-CM

## 2019-01-30 DIAGNOSIS — E11.40 TYPE 2 DIABETES MELLITUS WITH DIABETIC NEUROPATHY, WITH LONG-TERM CURRENT USE OF INSULIN (HCC): ICD-10-CM

## 2019-01-30 DIAGNOSIS — R82.998 LEUKOCYTES IN URINE: Primary | ICD-10-CM

## 2019-01-30 DIAGNOSIS — E11.22 CKD STAGE 3 DUE TO TYPE 2 DIABETES MELLITUS (HCC): ICD-10-CM

## 2019-01-30 DIAGNOSIS — I10 ESSENTIAL HYPERTENSION: ICD-10-CM

## 2019-01-30 DIAGNOSIS — D64.9 CHRONIC ANEMIA: ICD-10-CM

## 2019-01-30 DIAGNOSIS — Z79.4 TYPE 2 DIABETES MELLITUS WITH DIABETIC NEUROPATHY, WITH LONG-TERM CURRENT USE OF INSULIN (HCC): ICD-10-CM

## 2019-01-30 LAB
ALBUMIN SERPL-MCNC: 3.9 G/DL (ref 2.9–4.4)
ALBUMIN/GLOB SERPL: 1.2 {RATIO} (ref 0.7–1.7)
ALPHA1 GLOB FLD ELPH-MCNC: 0.3 G/DL (ref 0–0.4)
ALPHA2 GLOB SERPL ELPH-MCNC: 0.6 G/DL (ref 0.4–1)
B-GLOBULIN SERPL ELPH-MCNC: 1.2 G/DL (ref 0.7–1.3)
BILIRUB BLD-MCNC: NEGATIVE MG/DL
CLARITY, POC: ABNORMAL
COLOR UR: YELLOW
GAMMA GLOB SERPL ELPH-MCNC: 1.1 G/DL (ref 0.4–1.8)
GLOBULIN SER CALC-MCNC: 3.2 G/DL (ref 2.2–3.9)
GLUCOSE UR STRIP-MCNC: NEGATIVE MG/DL
HCT VFR BLD AUTO: 26.4 % (ref 37–47)
HGB BLD-MCNC: 8.9 G/DL (ref 12–16)
IGA SERPL-MCNC: 544 MG/DL (ref 64–422)
IGG SERPL-MCNC: 845 MG/DL (ref 700–1600)
IGM SERPL-MCNC: 218 MG/DL (ref 26–217)
KAPPA LC SERPL-MCNC: 66.4 MG/L (ref 3.3–19.4)
KAPPA LC/LAMBDA SER: 1.64 {RATIO} (ref 0.26–1.65)
KETONES UR QL: NEGATIVE
LAMBDA LC FREE SERPL-MCNC: 40.4 MG/L (ref 5.7–26.3)
LEUKOCYTE EST, POC: ABNORMAL
Lab: NORMAL
M-SPIKE: NORMAL G/DL
NITRITE UR-MCNC: POSITIVE MG/ML
PH UR: 5.5 [PH] (ref 5–8)
PROT PATTERN SERPL IFE-IMP: ABNORMAL
PROT SERPL-MCNC: 7.1 G/DL (ref 6–8.5)
PROT UR STRIP-MCNC: NEGATIVE MG/DL
RBC # UR STRIP: NEGATIVE /UL
SP GR UR: 1.01 (ref 1–1.03)
UROBILINOGEN UR QL: NORMAL

## 2019-01-30 PROCEDURE — 81003 URINALYSIS AUTO W/O SCOPE: CPT | Performed by: INTERNAL MEDICINE

## 2019-01-30 PROCEDURE — 63510000001 EPOETIN ALFA PER 1000 UNITS: Performed by: INTERNAL MEDICINE

## 2019-01-30 PROCEDURE — 85014 HEMATOCRIT: CPT | Performed by: INTERNAL MEDICINE

## 2019-01-30 PROCEDURE — 85018 HEMOGLOBIN: CPT | Performed by: INTERNAL MEDICINE

## 2019-01-30 PROCEDURE — 99214 OFFICE O/P EST MOD 30 MIN: CPT | Performed by: INTERNAL MEDICINE

## 2019-01-30 PROCEDURE — 96372 THER/PROPH/DIAG INJ SC/IM: CPT

## 2019-01-30 RX ORDER — CEPHALEXIN 500 MG/1
500 CAPSULE ORAL 2 TIMES DAILY
Qty: 14 CAPSULE | Refills: 0 | Status: SHIPPED | OUTPATIENT
Start: 2019-01-30 | End: 2019-02-14

## 2019-01-30 RX ADMIN — ERYTHROPOIETIN 20000 UNITS: 20000 INJECTION, SOLUTION INTRAVENOUS; SUBCUTANEOUS at 13:28

## 2019-01-30 NOTE — PROGRESS NOTES
Joya Singh is a 78 y.o. female, who presents with a chief complaint of   Chief Complaint   Patient presents with   • Follow-up     1 month f/u    • Diabetes       HPI   Pt here for f/u on diabetes.  She is on tresiba 52 units nightly and novolog with meals.   She takes novolog if glucoses over 150.  She checks glucoses usually at breakfast and dinner.  At lunch she often goes to the [facility] and forgets her meds when she goes.      She has chronic anemia.  She had and egd and c-scope.  She has been on procrit but it isnt helping.  She saw hematology yesterday for further work up.  She is off xarelto but anemia has been refractory.    HTN - well controlled.      She has been stressed bc her oldest son Richard is on meth and is having a hard time.  He was found down and then sent to [facility].  She has a good relationship with her sons rober and mulu.     She has a hx of recurrent UTI's.  She has had dysuria and frequency x 2 weeks.     The following portions of the patient's history were reviewed and updated as appropriate: allergies, current medications, past family history, past medical history, past social history, past surgical history and problem list.    Allergies: Lisinopril; Baclofen; Codeine; Morphine; and Penicillins    Review of Systems   Constitutional: Negative.    HENT: Negative.    Eyes: Negative.    Respiratory: Negative.    Cardiovascular: Negative.    Gastrointestinal: Negative.    Endocrine: Negative.    Genitourinary: Negative.    Musculoskeletal: Negative.    Skin: Negative.    Allergic/Immunologic: Negative.    Neurological: Negative.    Hematological: Negative.    Psychiatric/Behavioral: Negative.    All other systems reviewed and are negative.            Wt Readings from Last 3 Encounters:   01/30/19 90.5 kg (199 lb 9.6 oz)   01/30/19 90.7 kg (199 lb 15.3 oz)   01/29/19 90.7 kg (199 lb 14.4 oz)     Temp Readings from Last 3 Encounters:   01/30/19 97.8 °F (36.6 °C) (Oral)   01/30/19 97.6 °F  (36.4 °C) (Temporal)   01/29/19 97.6 °F (36.4 °C) (Oral)     BP Readings from Last 3 Encounters:   01/30/19 130/60   01/30/19 120/48   01/29/19 126/73     Pulse Readings from Last 3 Encounters:   01/30/19 102   01/30/19 83   01/29/19 80     Body mass index is 36.5 kg/m².  @LASTSAO2(3)@    Physical Exam   Constitutional: She is oriented to person, place, and time. She appears well-developed and well-nourished. No distress.   HENT:   Head: Normocephalic and atraumatic.   Right Ear: External ear normal.   Left Ear: External ear normal.   Nose: Nose normal.   Mouth/Throat: Oropharynx is clear and moist.   Eyes: Conjunctivae and EOM are normal. Pupils are equal, round, and reactive to light.   Neck: Normal range of motion. Neck supple.   Cardiovascular: Normal rate, regular rhythm, normal heart sounds and intact distal pulses.   Pulmonary/Chest: Effort normal and breath sounds normal. No respiratory distress. She has no wheezes.   Musculoskeletal: Normal range of motion.   Normal gait   Neurological: She is alert and oriented to person, place, and time.   Skin: Skin is warm and dry.   Psychiatric: She has a normal mood and affect. Her behavior is normal. Judgment and thought content normal.   Nursing note and vitals reviewed.      Results for orders placed or performed in visit on 01/30/19   POCT urinalysis dipstick, automated   Result Value Ref Range    Color Yellow Yellow, Straw, Dark Yellow, Mehnaz    Clarity, UA Slightly Cloudy (A) Clear    Specific Gravity  1.015 1.005 - 1.030    pH, Urine 5.5 5.0 - 8.0    Leukocytes Small (1+) (A) Negative    Nitrite, UA Positive (A) Negative    Protein, POC Negative Negative mg/dL    Glucose, UA Negative Negative, 1000 mg/dL (3+) mg/dL    Ketones, UA Negative Negative    Urobilinogen, UA Normal Normal    Bilirubin Negative Negative    Blood, UA Negative Negative           Joya was seen today for follow-up and diabetes.    Diagnoses and all orders for this visit:    Leukocytes in  urine  -     POCT urinalysis dipstick, automated  -     Urine Culture - Urine, Urine, Clean Catch  -     cephalexin (KEFLEX) 500 MG capsule; Take 1 capsule by mouth 2 (Two) Times a Day.    Essential hypertension    Type 2 diabetes mellitus with diabetic neuropathy, with long-term current use of insulin (CMS/[facility])    Chronic anemia      Cont current meds.  Encouraged healthy diet and exercise.  F/u with labs in 1 mo.     Outpatient Medications Prior to Visit   Medication Sig Dispense Refill   • ACCU-CHEK FASTCLIX LANCETS misc TEST 3-4 TIMES DAILY AS DIRECTED 400 each 3   • ACCU-CHEK SMARTVIEW test strip TEST blood sugar three times daily OR AS DIRECTED 300 each 3   • acetaminophen (TYLENOL) 325 MG tablet Take 2 tablets by mouth Every 6 (Six) Hours As Needed for Mild Pain . OTC product 40 tablet 0   • Alcohol Swabs (B-D SINGLE USE SWABS REGULAR) pads      • apixaban (ELIQUIS) 2.5 MG tablet tablet Take 2.5 mg by mouth 2 (Two) Times a Day.     • atorvastatin (LIPITOR) 10 MG tablet TAKE ONE TABLET BY MOUTH AT BEDTIME 90 tablet 1   • Blood Glucose Monitoring Suppl (ACCU-CHEK KENNETH SMARTVIEW) w/Device kit TEST blood sugar three times daily or as directed 1 kit 0   • cyclobenzaprine (FLEXERIL) 10 MG tablet TAKE ONE TABLET BY MOUTH TWICE DAILY as needed for muscle spasms 30 tablet 0   • diphenhydrAMINE (BENADRYL) 25 mg capsule Take 25 mg by mouth Every 6 (Six) Hours As Needed for Itching.     • DULoxetine (CYMBALTA) 30 MG capsule Take 1 capsule by mouth 2 (Two) Times a Day. 60 capsule 5   • gabapentin (NEURONTIN) 400 MG capsule 400 mg AM, 400 mg NOON, 800 mg  capsule 5   • HYDROcodone-acetaminophen (NORCO) 5-325 MG per tablet TAKE ONE (1) TABLET ORALLY (BY MOUTH) EVERY 12 HOURS AS NEEDED FOR MODERATE PAIN 60 tablet 0   • insulin aspart (novoLOG FLEXPEN) 100 UNIT/ML solution pen-injector sc pen Inject 25 Units under the skin into the appropriate area as directed Every Morning Before Breakfast. 30 units with breakfast 35  "units with lunch 35 units with dinner      • insulin aspart (novoLOG) 100 UNIT/ML injection Inject 15 Units under the skin into the appropriate area as directed Daily With Breakfast. 15 units with breakfast 25 units with lunch 25 units with dinner     • Insulin Degludec (TRESIBA FLEXTOUCH) 200 UNIT/ML solution pen-injector Inject 50 Units under the skin into the appropriate area as directed every night at bedtime.     • levothyroxine (SYNTHROID) 88 MCG tablet Take 1 tablet by mouth Daily. 90 tablet 1   • midodrine (PROAMATINE) 2.5 MG tablet TAKE ONE TABLET BY MOUTH EVERY MORNING AND ONE tablet SIX hours LATER 60 tablet 5   • Multiple Vitamins-Minerals (CENTRUM SILVER PO) Take  by mouth Daily.     • Needle, Disp, (BD DISP NEEDLES) 30G X 1/2\" misc To be used 3 times daily with Novolog Flexpen. 100 each 5   • O2 (OXYGEN) Inhale 2 L/min Every Night. Uses 2L  overnight only     • omeprazole (priLOSEC) 40 MG capsule Take one po daily 90 capsule 1   • potassium chloride (MICRO-K) 10 MEQ CR capsule Take 10 mEq by mouth Every Morning.     • promethazine (PHENERGAN) 12.5 MG tablet 1/2 to 1 tablet every 6 hours as needed for nausea 20 tablet 0     Facility-Administered Medications Prior to Visit   Medication Dose Route Frequency Provider Last Rate Last Dose   • epoetin chu (EPOGEN,PROCRIT) 99027 UNIT/ML injection  - ADS Override Pull            • epoetin chu (EPOGEN,PROCRIT) injection 20,000 Units  20,000 Units Subcutaneous Weekly RogelioChari MD   20,000 Units at 01/30/19 1328     New Medications Ordered This Visit   Medications   • cephalexin (KEFLEX) 500 MG capsule     Sig: Take 1 capsule by mouth 2 (Two) Times a Day.     Dispense:  14 capsule     Refill:  0     [unfilled]  There are no discontinued medications.      Return in about 1 month (around 2/28/2019) for Recheck, labs.  "

## 2019-01-31 ENCOUNTER — APPOINTMENT (OUTPATIENT)
Dept: INFUSION THERAPY | Facility: HOSPITAL | Age: 79
End: 2019-01-31

## 2019-02-02 LAB
BACTERIA UR CULT: ABNORMAL
BACTERIA UR CULT: ABNORMAL
OTHER ANTIBIOTIC SUSC ISLT: ABNORMAL

## 2019-02-05 ENCOUNTER — LAB (OUTPATIENT)
Dept: LAB | Facility: HOSPITAL | Age: 79
End: 2019-02-05

## 2019-02-05 ENCOUNTER — TELEPHONE (OUTPATIENT)
Dept: INTERNAL MEDICINE | Facility: CLINIC | Age: 79
End: 2019-02-05

## 2019-02-05 ENCOUNTER — OFFICE VISIT (OUTPATIENT)
Dept: ONCOLOGY | Facility: CLINIC | Age: 79
End: 2019-02-05

## 2019-02-05 VITALS
BODY MASS INDEX: 37.04 KG/M2 | TEMPERATURE: 97.7 F | HEIGHT: 62 IN | WEIGHT: 201.3 LBS | OXYGEN SATURATION: 93 % | HEART RATE: 83 BPM | DIASTOLIC BLOOD PRESSURE: 79 MMHG | RESPIRATION RATE: 16 BRPM | SYSTOLIC BLOOD PRESSURE: 153 MMHG

## 2019-02-05 DIAGNOSIS — E11.22 CKD STAGE 3 DUE TO TYPE 2 DIABETES MELLITUS (HCC): ICD-10-CM

## 2019-02-05 DIAGNOSIS — D64.9 CHRONIC ANEMIA: Primary | ICD-10-CM

## 2019-02-05 DIAGNOSIS — N18.30 CKD STAGE 3 DUE TO TYPE 2 DIABETES MELLITUS (HCC): ICD-10-CM

## 2019-02-05 DIAGNOSIS — D64.9 ANEMIA REQUIRING TRANSFUSIONS: ICD-10-CM

## 2019-02-05 LAB
BASOPHILS # BLD AUTO: 0.07 10*3/MM3 (ref 0–0.2)
BASOPHILS NFR BLD AUTO: 1.4 % (ref 0–2)
DEPRECATED RDW RBC AUTO: 84.8 FL (ref 37–54)
EOSINOPHIL # BLD AUTO: 0.33 10*3/MM3 (ref 0.1–0.3)
EOSINOPHIL NFR BLD AUTO: 6.6 % (ref 0–4)
ERYTHROCYTE [DISTWIDTH] IN BLOOD BY AUTOMATED COUNT: 21.7 % (ref 11.5–14.5)
HCT VFR BLD AUTO: 25 % (ref 37–47)
HGB BLD-MCNC: 8.4 G/DL (ref 12–16)
IMM GRANULOCYTES # BLD AUTO: 0.03 10*3/MM3 (ref 0–0.03)
IMM GRANULOCYTES NFR BLD AUTO: 0.6 % (ref 0–0.5)
LYMPHOCYTES # BLD AUTO: 1.91 10*3/MM3 (ref 0.6–4.8)
LYMPHOCYTES NFR BLD AUTO: 38.4 % (ref 20–45)
MCH RBC QN AUTO: 35.7 PG (ref 27–31)
MCHC RBC AUTO-ENTMCNC: 33.6 G/DL (ref 31–37)
MCV RBC AUTO: 106.4 FL (ref 81–99)
MONOCYTES # BLD AUTO: 0.23 10*3/MM3 (ref 0–1)
MONOCYTES NFR BLD AUTO: 4.6 % (ref 3–8)
NEUTROPHILS # BLD AUTO: 2.41 10*3/MM3 (ref 1.5–8.3)
NEUTROPHILS NFR BLD AUTO: 48.4 % (ref 45–70)
NRBC BLD AUTO-RTO: 0 /100 WBC (ref 0–0)
PLATELET # BLD AUTO: 384 10*3/MM3 (ref 140–500)
PMV BLD AUTO: 11.3 FL (ref 7.4–10.4)
RBC # BLD AUTO: 2.35 10*6/MM3 (ref 4.2–5.4)
WBC NRBC COR # BLD: 4.98 10*3/MM3 (ref 4.8–10.8)

## 2019-02-05 PROCEDURE — 99214 OFFICE O/P EST MOD 30 MIN: CPT | Performed by: INTERNAL MEDICINE

## 2019-02-05 PROCEDURE — 85025 COMPLETE CBC W/AUTO DIFF WBC: CPT | Performed by: INTERNAL MEDICINE

## 2019-02-05 PROCEDURE — 36415 COLL VENOUS BLD VENIPUNCTURE: CPT | Performed by: INTERNAL MEDICINE

## 2019-02-05 NOTE — PROGRESS NOTES
Subjective     REASON FOR FOLLOW-UP: macrocytic anemia  Provide an opinion on any further workup or treatment                             REQUESTING PHYSICIAN:  Dr. Baugh    RECORDS OBTAINED:  Records of the patients history including those obtained from the referring provider were reviewed and summarized in detail.    History of Present Illness   This is a very pleasant 78-year-old woman seen today for evaluation of anemia.  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  She has stage III chronic kidney disease followed by Dr. Garza of nephrology.  Reviewing her records, the patient has had anemia present since at least 2014 but her hemoglobin has worsened over the past 6 months.  The patient was admitted to the hospital in October 2018 with symptomatic anemia, shortness of breath and lightheadedness.  She was found to have hemoglobin of 7.0.  There was concern for GI blood loss although EGD and colonoscopy performed showed no obvious etiology of blood loss.  The patient states that she was transfused 7 units of packed red blood cells during the hospital stay.  I do not see any Hemoccult results.  She was previously on Eliquis which was discontinued.  Since discharge in October, the patient has been receiving weekly Procrit 20,000 units in the [facility] at [place], but despite Procrit she has required transfusion on 2 separate occasions.  She is not seeing any bright red blood per rectum or melena.  She complains of fatigue and lightheadedness.    Recent iron profile on 1/17/19 was inconsistent with iron deficiency with an iron saturation 68% and the ferritin was 352.  The red blood cells are macrocytic.  White blood cell and platelet counts have been normal.    The patient was seen in hematology on 1/29/19 and additional evaluation performed.  The reticulocyte count was elevated 3.72% but the haptoglobin and LDH were both normal arguing  against a hemolytic process.  B12 and folic acid levels were normal.  Serum protein electrophoresis showed no M spike but the immunofixation identified a small IgA monoclonal protein with lambda specificity; IgA level 544.  Sedimentation rate elevated 58.  A free light chain ratio from the serum was normal 1.64.    The patient returns today feeling more fatigued and mildly lightheaded.  She denies bright red blood per rectum or melena.    Past Medical History:   Diagnosis Date   • Abnormal urination    • Allergic rhinitis    • Anemia    • Anxiety    • Appetite absent    • Arthritis    • Asthma    • Back pain    • Bell's palsy    • Black tarry stools    • Blood in stool    • CKD (chronic kidney disease) stage 3, GFR 30-59 ml/min (CMS/[facility])    • Cough    • Depression    • Diabetes mellitus (CMS/[facility])     LAST A1C 6   • Diabetic gastroparesis (CMS/[facility]) 2/19/2016   • Difficulty walking    • Dizziness 2018   • Excessive urination at night    • Fatigue    • Fever, low grade    • Frequent urination    • GERD (gastroesophageal reflux disease)    • GI bleed    • H/O blood clots     LEFT LEG 7 OR 8 YEARS AGO   • Heat intolerance    • History of fall 10/2018   • History of prior pregnancies     x8, miscarriage 5   • History of transfusion 11/2018    due to anemia   • Hyperlipidemia    • Hypertension    • Hypothyroidism    • Nausea & vomiting    • Normal coronary arteries     by cath 2013   • AARON (obstructive sleep apnea)     DOESNT WEAR REGULARLY   • PONV (postoperative nausea and vomiting)    • Skin cancer    • Stroke (CMS/[facility])     Several mini-strokes   • Swelling    • TIA (transient ischemic attack)     LAST TIA JULY 2017   • Urination pain    • UTI (urinary tract infection)     Dec 2018 and Jan 2019   • Weight loss         Past Surgical History:   Procedure Laterality Date   • BACK SURGERY      HARDWARE   • CHOLECYSTECTOMY      OPEN   • COLONOSCOPY  2011    due for repeat in 2021   • COLONOSCOPY N/A 10/28/2018    Procedure:  COLONOSCOPY;  Surgeon: Emmanuel Rogers MD;  Location: [facility] OR;  Service: Gastroenterology   • ENDOSCOPY N/A 10/26/2018    Procedure: ESOPHAGOGASTRODUODENOSCOPY;  Surgeon: Emmanuel Rogers MD;  Location: [facility] OR;  Service: Gastroenterology   • HYSTERECTOMY      PARTIAL    • NECK SURGERY     • SPINE SURGERY     • TUMOR REMOVAL Left     Leg   • UPPER GASTROINTESTINAL ENDOSCOPY  2014    gastritis.  done by dr. hastings        Current Outpatient Medications on File Prior to Visit   Medication Sig Dispense Refill   • ACCU-CHEK FASTCLIX LANCETS misc TEST 3-4 TIMES DAILY AS DIRECTED 400 each 3   • ACCU-CHEK SMARTVIEW test strip TEST blood sugar three times daily OR AS DIRECTED 300 each 3   • acetaminophen (TYLENOL) 325 MG tablet Take 2 tablets by mouth Every 6 (Six) Hours As Needed for Mild Pain . OTC product 40 tablet 0   • Alcohol Swabs (B-D SINGLE USE SWABS REGULAR) pads      • apixaban (ELIQUIS) 2.5 MG tablet tablet Take 2.5 mg by mouth 2 (Two) Times a Day.     • atorvastatin (LIPITOR) 10 MG tablet TAKE ONE TABLET BY MOUTH AT BEDTIME 90 tablet 1   • Blood Glucose Monitoring Suppl (ACCU-CHEK KENNETH SMARTVIEW) w/Device kit TEST blood sugar three times daily or as directed 1 kit 0   • cephalexin (KEFLEX) 500 MG capsule Take 1 capsule by mouth 2 (Two) Times a Day. 14 capsule 0   • cyclobenzaprine (FLEXERIL) 10 MG tablet TAKE ONE TABLET BY MOUTH TWICE DAILY as needed for muscle spasms 30 tablet 0   • diphenhydrAMINE (BENADRYL) 25 mg capsule Take 25 mg by mouth Every 6 (Six) Hours As Needed for Itching.     • DULoxetine (CYMBALTA) 30 MG capsule Take 1 capsule by mouth 2 (Two) Times a Day. 60 capsule 5   • gabapentin (NEURONTIN) 400 MG capsule 400 mg AM, 400 mg NOON, 800 mg  capsule 5   • HYDROcodone-acetaminophen (NORCO) 5-325 MG per tablet TAKE ONE (1) TABLET ORALLY (BY MOUTH) EVERY 12 HOURS AS NEEDED FOR MODERATE PAIN 60 tablet 0   • insulin aspart (novoLOG FLEXPEN) 100 UNIT/ML solution  "pen-injector sc pen Inject 25 Units under the skin into the appropriate area as directed Every Morning Before Breakfast. 30 units with breakfast 35 units with lunch 35 units with dinner      • insulin aspart (novoLOG) 100 UNIT/ML injection Inject 15 Units under the skin into the appropriate area as directed Daily With Breakfast. 15 units with breakfast 25 units with lunch 25 units with dinner     • Insulin Degludec (TRESIBA FLEXTOUCH) 200 UNIT/ML solution pen-injector Inject 50 Units under the skin into the appropriate area as directed every night at bedtime.     • levothyroxine (SYNTHROID) 88 MCG tablet Take 1 tablet by mouth Daily. 90 tablet 1   • midodrine (PROAMATINE) 2.5 MG tablet TAKE ONE TABLET BY MOUTH EVERY MORNING AND ONE tablet SIX hours LATER 60 tablet 5   • Multiple Vitamins-Minerals (CENTRUM SILVER PO) Take  by mouth Daily.     • Needle, Disp, (BD DISP NEEDLES) 30G X 1/2\" misc To be used 3 times daily with Novolog Flexpen. 100 each 5   • O2 (OXYGEN) Inhale 2 L/min Every Night. Uses 2L  overnight only     • omeprazole (priLOSEC) 40 MG capsule Take one po daily 90 capsule 1   • potassium chloride (MICRO-K) 10 MEQ CR capsule Take 10 mEq by mouth Every Morning.     • promethazine (PHENERGAN) 12.5 MG tablet 1/2 to 1 tablet every 6 hours as needed for nausea 20 tablet 0     No current facility-administered medications on file prior to visit.         ALLERGIES:    Allergies   Allergen Reactions   • Lisinopril Cough   • Baclofen Anxiety     Panic attack, nightmares   • Codeine Itching and Rash   • Morphine Hives   • Penicillins Rash     Tolerates cephalosporins         Social History     Socioeconomic History   • Marital status:      Spouse name: Not on file   • Number of children: 3   • Years of education: High School   • Highest education level: Not on file   Occupational History     Employer: RETIRED   Tobacco Use   • Smoking status: Never Smoker   • Smokeless tobacco: Never Used   • Tobacco comment: " "CAFFEINE USE: NONE   Substance and Sexual Activity   • Alcohol use: No   • Drug use: No   • Sexual activity: Defer     Comment: EXERCISE - RARELY        Family History   Problem Relation Age of Onset   • Lupus Mother    • Heart failure Mother 59   • Heart disease Other    • Hypertension Other    • Heart attack Father         Review of Systems   Constitutional: Positive for activity change, fatigue, fever and unexpected weight change. Negative for appetite change.   HENT: Negative.    Respiratory: Positive for cough and shortness of breath. Negative for apnea.    Cardiovascular: Negative.    Gastrointestinal: Positive for nausea and vomiting. Negative for abdominal pain and blood in stool.   Genitourinary: Positive for frequency and urgency.   Musculoskeletal: Positive for arthralgias, back pain and gait problem. Negative for neck stiffness.   Skin: Negative.    Neurological: Positive for dizziness and numbness. Negative for tremors and speech difficulty.   Hematological: Negative.    Psychiatric/Behavioral: Negative.      Symptoms are stable but she has more fatigue and lightheadedness today    Objective     Vitals:    02/05/19 1044   BP: 153/79   Pulse: 83   Resp: 16   Temp: 97.7 °F (36.5 °C)   TempSrc: Oral   SpO2: 93%   Weight: 91.3 kg (201 lb 4.8 oz)   Height: 157.5 cm (62.01\")   PainSc:   9   PainLoc: Shoulder  Comment: shoulder and knee pain     Current Status 2/5/2019   ECOG score 1       Physical Exam    CON: pleasant well-developed somewhat chronic ill appearing woman  HEENT: no icterus, no thrush, moist membranes  NECK: no jvd  LYMPH: no cervical or supraclavicular lad  CV: RRR, S1S2, no murmur  RESP: cta bilat, no wheezing, no rales  GI: soft, non-tender, no splenomegaly, +bs, obese  MUSC: no edema, ambulates with cane  NEURO: alert and oriented x3, normal strength  PSYCH: normal mood  Exam unchanged-2/5/19    RECENT LABS:  Hematology WBC   Date Value Ref Range Status   02/05/2019 4.98 4.80 - 10.80 " 10*3/mm3 Final   11/26/2018 7.63 4.80 - 10.80 10*3/mm3 Final     RBC   Date Value Ref Range Status   02/05/2019 2.35 (L) 4.20 - 5.40 10*6/mm3 Final   11/26/2018 2.67 (L) 4.20 - 5.40 10*6/mm3 Final     Hemoglobin   Date Value Ref Range Status   02/05/2019 8.4 (L) 12.0 - 16.0 g/dL Final     Hematocrit   Date Value Ref Range Status   02/05/2019 25.0 (L) 37.0 - 47.0 % Final     Platelets   Date Value Ref Range Status   02/05/2019 384 140 - 500 10*3/mm3 Final        CT abdomen/pelvis 10/26/18 reviewed and showed no evidence of cirrhosis or splenomegaly.    Assessment/Plan     1.  Macrocytic anemia: This nice lady has chronic anemia which has worsened over the past 6 months.  There is no associated leukopenia or thrombocytopenia.  Red blood cell indices are macrocytic.  She has been evaluated for GI blood loss with colonoscopy and EGD with no obvious etiology.  Her iron levels have not been low which argues against GI blood loss.  She does have stage III chronic kidney disease and has been receiving Procrit weekly since November 2018 but despite Procrit has required blood transfusion on 2 occasions.  Degree of anemia seems out of proportion to stage III chronic kidney disease.  Her inflammatory markers were dramatically elevated in November.  She is not seeing bright red blood per rectum or melena.    Laboratory data as outlined in the history of present illness does not identify any problems above her anemia of chronic kidney disease.  I am still suspicious for an underlying bone marrow pathology as her degree of anemia is out of proportion to anemia of stage III chronic kidney disease.  I recommended and ordered a bone marrow exam to be performed under CT guidance to make sure her bone marrow function is normal, evaluate for problems such as mild dysplastic syndrome etc.  The patient was agreeable.  I discussed with her risk of pain, bleeding, infection from the procedure.    In the interim we will continue Procrit for  anemia of chronic kidney disease stage III.  However, we will begin the Procrit and her hematology office so that the dose can be titrated up per Medicare guidelines.  I discussed her case with our nurse and also the referring physician Dr. Yasmani Baugh.

## 2019-02-06 ENCOUNTER — APPOINTMENT (OUTPATIENT)
Dept: LAB | Facility: HOSPITAL | Age: 79
End: 2019-02-06

## 2019-02-06 ENCOUNTER — APPOINTMENT (OUTPATIENT)
Dept: ONCOLOGY | Facility: CLINIC | Age: 79
End: 2019-02-06

## 2019-02-06 ENCOUNTER — TELEPHONE (OUTPATIENT)
Dept: INTERNAL MEDICINE | Facility: CLINIC | Age: 79
End: 2019-02-06

## 2019-02-06 NOTE — TELEPHONE ENCOUNTER
----- Message from Gaviota Bowles MA sent at 2/5/2019  5:02 PM EST -----      ----- Message -----  From: Chari Mercado MD  Sent: 2/5/2019   3:51 PM  To: Gaviota Bowles MA    I discontinued the therapy plan for procrit.    ----- Message -----  From: Gaviota Bowles MA  Sent: 2/5/2019   1:41 PM  To: Chari Mercado MD        ----- Message -----  From: Keren Sarabia  Sent: 2/5/2019  12:30 PM  To: [facility] Clinical Pool    CALLER IS [facility]. WANTS TO KNOW IF ÁLVARO CAN DO A WITHDRAW ON THE PROCREATE INJECTIONS 20,000 UNITS. DR BOWLES IS GOING TO TAKE OVER THIS AND NEEDS ÁLVARO TO WITHDRAW FROM HER INSURANCE. FOR MORE INFO, CALL BACK [phone].

## 2019-02-07 ENCOUNTER — INFUSION (OUTPATIENT)
Dept: ONCOLOGY | Facility: HOSPITAL | Age: 79
End: 2019-02-07

## 2019-02-07 ENCOUNTER — HOSPITAL ENCOUNTER (OUTPATIENT)
Dept: INFUSION THERAPY | Facility: HOSPITAL | Age: 79
Discharge: HOME | End: 2019-02-07

## 2019-02-07 VITALS — DIASTOLIC BLOOD PRESSURE: 47 MMHG | OXYGEN SATURATION: 96 % | SYSTOLIC BLOOD PRESSURE: 102 MMHG | HEART RATE: 75 BPM

## 2019-02-07 DIAGNOSIS — E11.22 CKD STAGE 3 DUE TO TYPE 2 DIABETES MELLITUS (HCC): ICD-10-CM

## 2019-02-07 DIAGNOSIS — D64.9 ANEMIA REQUIRING TRANSFUSIONS: Primary | ICD-10-CM

## 2019-02-07 DIAGNOSIS — N18.30 CKD STAGE 3 DUE TO TYPE 2 DIABETES MELLITUS (HCC): ICD-10-CM

## 2019-02-07 PROCEDURE — 63510000001 EPOETIN ALFA PER 1000 UNITS: Performed by: NURSE PRACTITIONER

## 2019-02-07 PROCEDURE — 96372 THER/PROPH/DIAG INJ SC/IM: CPT | Performed by: NURSE PRACTITIONER

## 2019-02-07 RX ADMIN — ERYTHROPOIETIN 25000 UNITS: 20000 INJECTION, SOLUTION INTRAVENOUS; SUBCUTANEOUS at 09:39

## 2019-02-14 ENCOUNTER — LAB (OUTPATIENT)
Dept: LAB | Facility: HOSPITAL | Age: 79
End: 2019-02-14

## 2019-02-14 ENCOUNTER — INFUSION (OUTPATIENT)
Dept: ONCOLOGY | Facility: HOSPITAL | Age: 79
End: 2019-02-14

## 2019-02-14 VITALS
TEMPERATURE: 98 F | DIASTOLIC BLOOD PRESSURE: 59 MMHG | OXYGEN SATURATION: 93 % | SYSTOLIC BLOOD PRESSURE: 137 MMHG | HEART RATE: 85 BPM

## 2019-02-14 DIAGNOSIS — D64.9 ANEMIA REQUIRING TRANSFUSIONS: Primary | ICD-10-CM

## 2019-02-14 DIAGNOSIS — D64.9 CHRONIC ANEMIA: ICD-10-CM

## 2019-02-14 DIAGNOSIS — N18.30 CKD STAGE 3 DUE TO TYPE 2 DIABETES MELLITUS (HCC): ICD-10-CM

## 2019-02-14 DIAGNOSIS — E11.22 CKD STAGE 3 DUE TO TYPE 2 DIABETES MELLITUS (HCC): ICD-10-CM

## 2019-02-14 LAB
ABO GROUP BLD: NORMAL
BASOPHILS # BLD AUTO: 0.11 10*3/MM3 (ref 0–0.2)
BASOPHILS NFR BLD AUTO: 1.3 % (ref 0–1.5)
BLD GP AB SCN SERPL QL: NEGATIVE
DEPRECATED RDW RBC AUTO: 87 FL (ref 37–54)
EOSINOPHIL # BLD AUTO: 0.39 10*3/MM3 (ref 0–0.4)
EOSINOPHIL NFR BLD AUTO: 4.6 % (ref 0.3–6.2)
ERYTHROCYTE [DISTWIDTH] IN BLOOD BY AUTOMATED COUNT: 22.8 % (ref 12.3–15.4)
HCT VFR BLD AUTO: 23.6 % (ref 34–46.6)
HGB BLD-MCNC: 8 G/DL (ref 12–15.9)
IMM GRANULOCYTES # BLD AUTO: 0.18 10*3/MM3 (ref 0–0.05)
IMM GRANULOCYTES NFR BLD AUTO: 2.1 % (ref 0–0.5)
LYMPHOCYTES # BLD AUTO: 3.21 10*3/MM3 (ref 0.7–3.1)
LYMPHOCYTES NFR BLD AUTO: 37.9 % (ref 19.6–45.3)
MCH RBC QN AUTO: 37 PG (ref 26.6–33)
MCHC RBC AUTO-ENTMCNC: 33.9 G/DL (ref 31.5–35.7)
MCV RBC AUTO: 109.3 FL (ref 79–97)
MONOCYTES # BLD AUTO: 0.34 10*3/MM3 (ref 0.1–0.9)
MONOCYTES NFR BLD AUTO: 4 % (ref 5–12)
NEUTROPHILS # BLD AUTO: 4.24 10*3/MM3 (ref 1.4–7)
NEUTROPHILS NFR BLD AUTO: 50.1 % (ref 42.7–76)
NRBC BLD AUTO-RTO: 0 /100 WBC (ref 0–0)
PLATELET # BLD AUTO: 451 10*3/MM3 (ref 140–450)
PMV BLD AUTO: 11.7 FL (ref 6–12)
RBC # BLD AUTO: 2.16 10*6/MM3 (ref 3.77–5.28)
RH BLD: POSITIVE
T&S EXPIRATION DATE: NORMAL
WBC NRBC COR # BLD: 8.47 10*3/MM3 (ref 3.4–10.8)

## 2019-02-14 PROCEDURE — 86901 BLOOD TYPING SEROLOGIC RH(D): CPT

## 2019-02-14 PROCEDURE — 86900 BLOOD TYPING SEROLOGIC ABO: CPT

## 2019-02-14 PROCEDURE — 36415 COLL VENOUS BLD VENIPUNCTURE: CPT

## 2019-02-14 PROCEDURE — 86850 RBC ANTIBODY SCREEN: CPT

## 2019-02-14 PROCEDURE — 85025 COMPLETE CBC W/AUTO DIFF WBC: CPT | Performed by: INTERNAL MEDICINE

## 2019-02-14 PROCEDURE — 63510000001 EPOETIN ALFA PER 1000 UNITS: Performed by: NURSE PRACTITIONER

## 2019-02-14 PROCEDURE — 86923 COMPATIBILITY TEST ELECTRIC: CPT

## 2019-02-14 PROCEDURE — 96372 THER/PROPH/DIAG INJ SC/IM: CPT | Performed by: NURSE PRACTITIONER

## 2019-02-14 RX ORDER — DIPHENHYDRAMINE HCL 25 MG
25 CAPSULE ORAL ONCE
Status: CANCELLED | OUTPATIENT
Start: 2019-02-14 | End: 2019-02-14

## 2019-02-14 RX ORDER — POTASSIUM CHLORIDE 750 MG/1
TABLET, FILM COATED, EXTENDED RELEASE ORAL
COMMUNITY
Start: 2019-02-12 | End: 2019-02-14 | Stop reason: SDUPTHER

## 2019-02-14 RX ORDER — SODIUM CHLORIDE 9 MG/ML
250 INJECTION, SOLUTION INTRAVENOUS AS NEEDED
Status: CANCELLED | OUTPATIENT
Start: 2019-02-14

## 2019-02-14 RX ORDER — ACETAMINOPHEN 325 MG/1
650 TABLET ORAL ONCE
Status: CANCELLED | OUTPATIENT
Start: 2019-02-14 | End: 2019-02-14

## 2019-02-14 RX ADMIN — ERYTHROPOIETIN 25000 UNITS: 20000 INJECTION, SOLUTION INTRAVENOUS; SUBCUTANEOUS at 10:04

## 2019-02-14 NOTE — PROGRESS NOTES
Hgb 8.0 today. The pt c/o worsening fatigue, dizziness, and difficulty breathing since Sunday, 2/9/19. Nurse s/w REY Hopper who recommended that the pt receive 2 units of blood. This was scheduled for 9am tomorrow at [facility]. The pt and son was informed. Labs were drawn by hospital phlebotomist who also banded the pt in preparation for tomorrow's transfusion. The pt was urged to call our office for any further questions or concerns. The pt v/u.

## 2019-02-15 ENCOUNTER — HOSPITAL ENCOUNTER (OUTPATIENT)
Dept: INFUSION THERAPY | Facility: HOSPITAL | Age: 79
Discharge: HOME | End: 2019-02-15
Admitting: NURSE PRACTITIONER

## 2019-02-15 VITALS
DIASTOLIC BLOOD PRESSURE: 75 MMHG | RESPIRATION RATE: 16 BRPM | TEMPERATURE: 97.8 F | OXYGEN SATURATION: 96 % | HEART RATE: 94 BPM | SYSTOLIC BLOOD PRESSURE: 169 MMHG

## 2019-02-15 DIAGNOSIS — E11.22 CKD STAGE 3 DUE TO TYPE 2 DIABETES MELLITUS (HCC): ICD-10-CM

## 2019-02-15 DIAGNOSIS — D64.9 ANEMIA REQUIRING TRANSFUSIONS: ICD-10-CM

## 2019-02-15 DIAGNOSIS — N18.30 CKD STAGE 3 DUE TO TYPE 2 DIABETES MELLITUS (HCC): ICD-10-CM

## 2019-02-15 PROCEDURE — 36430 TRANSFUSION BLD/BLD COMPNT: CPT

## 2019-02-15 PROCEDURE — 63710000001 ACETAMINOPHEN 325 MG TABLET

## 2019-02-15 PROCEDURE — P9016 RBC LEUKOCYTES REDUCED: HCPCS

## 2019-02-15 PROCEDURE — A9270 NON-COVERED ITEM OR SERVICE: HCPCS

## 2019-02-15 PROCEDURE — 86900 BLOOD TYPING SEROLOGIC ABO: CPT

## 2019-02-15 PROCEDURE — 63710000001 DIPHENHYDRAMINE PER 50 MG

## 2019-02-15 RX ORDER — SODIUM CHLORIDE 9 MG/ML
INJECTION, SOLUTION INTRAVENOUS
Status: COMPLETED
Start: 2019-02-15 | End: 2019-02-15

## 2019-02-15 RX ORDER — SODIUM CHLORIDE 9 MG/ML
250 INJECTION, SOLUTION INTRAVENOUS AS NEEDED
Status: DISCONTINUED | OUTPATIENT
Start: 2019-02-15 | End: 2019-02-17 | Stop reason: HOSPADM

## 2019-02-15 RX ORDER — DIPHENHYDRAMINE HCL 25 MG
25 CAPSULE ORAL ONCE
Status: COMPLETED | OUTPATIENT
Start: 2019-02-15 | End: 2019-02-15

## 2019-02-15 RX ORDER — DIPHENHYDRAMINE HCL 25 MG
CAPSULE ORAL
Status: COMPLETED
Start: 2019-02-15 | End: 2019-02-15

## 2019-02-15 RX ORDER — ACETAMINOPHEN 325 MG/1
650 TABLET ORAL ONCE
Status: COMPLETED | OUTPATIENT
Start: 2019-02-15 | End: 2019-02-15

## 2019-02-15 RX ORDER — ACETAMINOPHEN 325 MG/1
TABLET ORAL
Status: COMPLETED
Start: 2019-02-15 | End: 2019-02-15

## 2019-02-15 RX ADMIN — ACETAMINOPHEN 650 MG: 325 TABLET ORAL at 08:54

## 2019-02-15 RX ADMIN — Medication 25 MG: at 08:53

## 2019-02-15 RX ADMIN — DIPHENHYDRAMINE HYDROCHLORIDE 25 MG: 25 CAPSULE ORAL at 08:53

## 2019-02-15 RX ADMIN — ACETAMINOPHEN 650 MG: 325 TABLET, FILM COATED ORAL at 08:54

## 2019-02-15 RX ADMIN — SODIUM CHLORIDE 250 ML: 9 INJECTION, SOLUTION INTRAVENOUS at 11:43

## 2019-02-15 NOTE — PATIENT INSTRUCTIONS
Call Dr. Elieser Headley, [facility] at [phone]   Blood Transfusion, Adult, Care After  This sheet gives you information about how to care for yourself after your procedure. Your health care provider may also give you more specific instructions. If you have problems or questions, contact your health care provider.  What can I expect after the procedure?  After your procedure, it is common to have:  · Bruising and soreness where the IV tube was inserted.  · Headache.    Follow these instructions at home:  · Take over-the-counter and prescription medicines only as told by your health care provider.  · Return to your normal activities as told by your health care provider.  · Follow instructions from your health care provider about how to take care of your IV insertion site. Make sure you:  ? Wash your hands with soap and water before you change your bandage (dressing). If soap and water are not available, use hand .  ? Change your dressing as told by your health care provider.  · Check your IV insertion site every day for signs of infection. Check for:  ? More redness, swelling, or pain.  ? More fluid or blood.  ? Warmth.  ? Pus or a bad smell.  Contact a health care provider if:  · You have more redness, swelling, or pain around the IV insertion site.  · You have more fluid or blood coming from the IV insertion site.  · Your IV insertion site feels warm to the touch.  · You have pus or a bad smell coming from the IV insertion site.  · Your urine turns pink, red, or brown.  · You feel weak after doing your normal activities.  Get help right away if:  · You have signs of a serious allergic or immune system reaction, including:  ? Itchiness.  ? Hives.  ? Trouble breathing.  ? Anxiety.  ? Chest or lower back pain.  ? Fever, flushing, and chills.  ? Rapid pulse.  ? Rash.  ? Diarrhea.  ? Vomiting.  ? Dark urine.  ? Serious headache.  ? Dizziness.  ? Stiff neck.  ? Yellow coloration of the face or the white  "parts of the eyes (jaundice).  This information is not intended to replace advice given to you by your health care provider. Make sure you discuss any questions you have with your health care provider.  Document Released: 01/08/2016 Document Revised: 08/16/2017 Document Reviewed: 07/03/2017  [id number] Interactive Patient Education © 2018 [employer]   if you have any problems or concerns.    We know you have a Choice in healthcare and appreciate you using [facility].  Our purpose is to provide you \"Excellent Care\".  We hope that you will always choose us in the future and continue to recommend us to your family and friends.                "

## 2019-02-15 NOTE — NURSING NOTE
0900 PT. HERE FOR SCHEDULED BLOOD TRANSFUSION. 2 UNITS PRBC'S GIVEN WITHOUT COMPLICATION. 1430 BOTH UNITS FINISHED, IV REMOVED. PT. CALLED SON TO COME PICK HER UP. SITTING IN ACC ROOM WAITNG FOR SON'S ARRIVAL.1515 SON HERE PT. DISCHARGED WITH SON WITHOUT C/O VIA W/C.

## 2019-02-16 LAB
ABO + RH BLD: NORMAL
ABO + RH BLD: NORMAL
BH BB BLOOD EXPIRATION DATE: NORMAL
BH BB BLOOD EXPIRATION DATE: NORMAL
BH BB BLOOD TYPE BARCODE: 7300
BH BB BLOOD TYPE BARCODE: 7300
BH BB DISPENSE STATUS: NORMAL
BH BB DISPENSE STATUS: NORMAL
BH BB PRODUCT CODE: NORMAL
BH BB PRODUCT CODE: NORMAL
BH BB UNIT NUMBER: NORMAL
BH BB UNIT NUMBER: NORMAL
UNIT  ABO: NORMAL
UNIT  ABO: NORMAL
UNIT  RH: NORMAL
UNIT  RH: NORMAL

## 2019-02-21 ENCOUNTER — INFUSION (OUTPATIENT)
Dept: ONCOLOGY | Facility: HOSPITAL | Age: 79
End: 2019-02-21

## 2019-02-21 ENCOUNTER — APPOINTMENT (OUTPATIENT)
Dept: LAB | Facility: HOSPITAL | Age: 79
End: 2019-02-21

## 2019-02-21 ENCOUNTER — APPOINTMENT (OUTPATIENT)
Dept: INFUSION THERAPY | Facility: HOSPITAL | Age: 79
End: 2019-02-21

## 2019-02-21 DIAGNOSIS — D64.9 CHRONIC ANEMIA: ICD-10-CM

## 2019-02-21 DIAGNOSIS — N18.30 CKD STAGE 3 DUE TO TYPE 2 DIABETES MELLITUS (HCC): ICD-10-CM

## 2019-02-21 DIAGNOSIS — E11.22 CKD STAGE 3 DUE TO TYPE 2 DIABETES MELLITUS (HCC): ICD-10-CM

## 2019-02-21 LAB
BASOPHILS # BLD AUTO: 0.1 10*3/MM3 (ref 0–0.2)
BASOPHILS NFR BLD AUTO: 1.7 % (ref 0–1.5)
DEPRECATED RDW RBC AUTO: 75.5 FL (ref 37–54)
EOSINOPHIL # BLD AUTO: 0.39 10*3/MM3 (ref 0–0.4)
EOSINOPHIL NFR BLD AUTO: 6.7 % (ref 0.3–6.2)
ERYTHROCYTE [DISTWIDTH] IN BLOOD BY AUTOMATED COUNT: 19.8 % (ref 12.3–15.4)
HCT VFR BLD AUTO: 31 % (ref 34–46.6)
HGB BLD-MCNC: 10.3 G/DL (ref 12–15.9)
IMM GRANULOCYTES # BLD AUTO: 0.06 10*3/MM3 (ref 0–0.05)
IMM GRANULOCYTES NFR BLD AUTO: 1 % (ref 0–0.5)
LYMPHOCYTES # BLD AUTO: 2.37 10*3/MM3 (ref 0.7–3.1)
LYMPHOCYTES NFR BLD AUTO: 40.9 % (ref 19.6–45.3)
MCH RBC QN AUTO: 34.9 PG (ref 26.6–33)
MCHC RBC AUTO-ENTMCNC: 33.2 G/DL (ref 31.5–35.7)
MCV RBC AUTO: 105.1 FL (ref 79–97)
MONOCYTES # BLD AUTO: 0.32 10*3/MM3 (ref 0.1–0.9)
MONOCYTES NFR BLD AUTO: 5.5 % (ref 5–12)
NEUTROPHILS # BLD AUTO: 2.55 10*3/MM3 (ref 1.4–7)
NEUTROPHILS NFR BLD AUTO: 44.2 % (ref 42.7–76)
NRBC BLD AUTO-RTO: 0 /100 WBC (ref 0–0)
PLATELET # BLD AUTO: 339 10*3/MM3 (ref 140–450)
PMV BLD AUTO: 11.4 FL (ref 6–12)
RBC # BLD AUTO: 2.95 10*6/MM3 (ref 3.77–5.28)
WBC NRBC COR # BLD: 5.79 10*3/MM3 (ref 3.4–10.8)

## 2019-02-21 PROCEDURE — 85025 COMPLETE CBC W/AUTO DIFF WBC: CPT

## 2019-02-21 PROCEDURE — 36415 COLL VENOUS BLD VENIPUNCTURE: CPT

## 2019-02-21 NOTE — PROGRESS NOTES
Procrit held today for hgb of 10.3. The pt states that she is feeling better since receiving the blood transfusion last week. She was informed that she will not be receiving the procrit injection and will followup in one week for a repeat cbc and possible procrit injection.

## 2019-02-26 ENCOUNTER — TRANSCRIBE ORDERS (OUTPATIENT)
Dept: ADMINISTRATIVE | Facility: HOSPITAL | Age: 79
End: 2019-02-26

## 2019-02-27 RX ORDER — FUROSEMIDE 20 MG/1
TABLET ORAL
Qty: 90 TABLET | Refills: 1 | Status: SHIPPED | OUTPATIENT
Start: 2019-02-27 | End: 2019-08-19 | Stop reason: SDUPTHER

## 2019-02-28 ENCOUNTER — APPOINTMENT (OUTPATIENT)
Dept: LAB | Facility: HOSPITAL | Age: 79
End: 2019-02-28

## 2019-02-28 ENCOUNTER — INFUSION (OUTPATIENT)
Dept: ONCOLOGY | Facility: HOSPITAL | Age: 79
End: 2019-02-28

## 2019-02-28 VITALS
DIASTOLIC BLOOD PRESSURE: 66 MMHG | OXYGEN SATURATION: 92 % | TEMPERATURE: 97.9 F | SYSTOLIC BLOOD PRESSURE: 113 MMHG | HEART RATE: 83 BPM

## 2019-02-28 DIAGNOSIS — D64.9 ANEMIA REQUIRING TRANSFUSIONS: ICD-10-CM

## 2019-02-28 DIAGNOSIS — D64.9 CHRONIC ANEMIA: Primary | ICD-10-CM

## 2019-02-28 DIAGNOSIS — N18.30 CKD STAGE 3 DUE TO TYPE 2 DIABETES MELLITUS (HCC): ICD-10-CM

## 2019-02-28 DIAGNOSIS — E11.22 CKD STAGE 3 DUE TO TYPE 2 DIABETES MELLITUS (HCC): ICD-10-CM

## 2019-02-28 LAB
BASOPHILS # BLD AUTO: 0.09 10*3/MM3 (ref 0–0.2)
BASOPHILS NFR BLD AUTO: 1.6 % (ref 0–1.5)
DEPRECATED RDW RBC AUTO: 72.4 FL (ref 37–54)
EOSINOPHIL # BLD AUTO: 0.3 10*3/MM3 (ref 0–0.4)
EOSINOPHIL NFR BLD AUTO: 5.3 % (ref 0.3–6.2)
ERYTHROCYTE [DISTWIDTH] IN BLOOD BY AUTOMATED COUNT: 19 % (ref 12.3–15.4)
HCT VFR BLD AUTO: 29.1 % (ref 34–46.6)
HGB BLD-MCNC: 9.8 G/DL (ref 12–15.9)
IMM GRANULOCYTES # BLD AUTO: 0.04 10*3/MM3 (ref 0–0.05)
IMM GRANULOCYTES NFR BLD AUTO: 0.7 % (ref 0–0.5)
LYMPHOCYTES # BLD AUTO: 2.29 10*3/MM3 (ref 0.7–3.1)
LYMPHOCYTES NFR BLD AUTO: 40.5 % (ref 19.6–45.3)
MCH RBC QN AUTO: 35.1 PG (ref 26.6–33)
MCHC RBC AUTO-ENTMCNC: 33.7 G/DL (ref 31.5–35.7)
MCV RBC AUTO: 104.3 FL (ref 79–97)
MONOCYTES # BLD AUTO: 0.29 10*3/MM3 (ref 0.1–0.9)
MONOCYTES NFR BLD AUTO: 5.1 % (ref 5–12)
NEUTROPHILS # BLD AUTO: 2.64 10*3/MM3 (ref 1.4–7)
NEUTROPHILS NFR BLD AUTO: 46.8 % (ref 42.7–76)
NRBC BLD AUTO-RTO: 0 /100 WBC (ref 0–0)
PLATELET # BLD AUTO: 381 10*3/MM3 (ref 140–450)
PMV BLD AUTO: 11.5 FL (ref 6–12)
RBC # BLD AUTO: 2.79 10*6/MM3 (ref 3.77–5.28)
WBC NRBC COR # BLD: 5.65 10*3/MM3 (ref 3.4–10.8)

## 2019-02-28 PROCEDURE — 85025 COMPLETE CBC W/AUTO DIFF WBC: CPT | Performed by: INTERNAL MEDICINE

## 2019-02-28 PROCEDURE — 36415 COLL VENOUS BLD VENIPUNCTURE: CPT

## 2019-02-28 PROCEDURE — 63510000001 EPOETIN ALFA PER 1000 UNITS: Performed by: INTERNAL MEDICINE

## 2019-02-28 PROCEDURE — 96372 THER/PROPH/DIAG INJ SC/IM: CPT

## 2019-02-28 RX ADMIN — ERYTHROPOIETIN 25000 UNITS: 20000 INJECTION, SOLUTION INTRAVENOUS; SUBCUTANEOUS at 09:28

## 2019-03-06 ENCOUNTER — HOSPITAL ENCOUNTER (OUTPATIENT)
Dept: CT IMAGING | Facility: HOSPITAL | Age: 79
Discharge: HOME | End: 2019-03-06
Admitting: NURSE PRACTITIONER

## 2019-03-06 VITALS
DIASTOLIC BLOOD PRESSURE: 50 MMHG | HEART RATE: 76 BPM | BODY MASS INDEX: 37.04 KG/M2 | TEMPERATURE: 98.9 F | WEIGHT: 201.28 LBS | OXYGEN SATURATION: 94 % | HEIGHT: 62 IN | RESPIRATION RATE: 16 BRPM | SYSTOLIC BLOOD PRESSURE: 122 MMHG

## 2019-03-06 DIAGNOSIS — D64.9 CHRONIC ANEMIA: ICD-10-CM

## 2019-03-06 LAB
INR PPP: 1.13 (ref 0.9–1.1)
PROTHROMBIN TIME: 14.2 SECONDS (ref 12.1–15)

## 2019-03-06 PROCEDURE — 99153 MOD SED SAME PHYS/QHP EA: CPT

## 2019-03-06 PROCEDURE — 88184 FLOWCYTOMETRY/ TC 1 MARKER: CPT

## 2019-03-06 PROCEDURE — 85610 PROTHROMBIN TIME: CPT | Performed by: NURSE PRACTITIONER

## 2019-03-06 PROCEDURE — 88341 IMHCHEM/IMCYTCHM EA ADD ANTB: CPT

## 2019-03-06 PROCEDURE — 88342 IMHCHEM/IMCYTCHM 1ST ANTB: CPT

## 2019-03-06 PROCEDURE — 25010000002 FENTANYL CITRATE (PF) 100 MCG/2ML SOLUTION: Performed by: NURSE PRACTITIONER

## 2019-03-06 PROCEDURE — A9270 NON-COVERED ITEM OR SERVICE: HCPCS | Performed by: NURSE PRACTITIONER

## 2019-03-06 PROCEDURE — 25010000002 FENTANYL CITRATE (PF) 100 MCG/2ML SOLUTION: Performed by: ANESTHESIOLOGY

## 2019-03-06 PROCEDURE — 88313 SPECIAL STAINS GROUP 2: CPT | Performed by: INTERNAL MEDICINE

## 2019-03-06 PROCEDURE — 25010000002 MIDAZOLAM PER 1 MG: Performed by: ANESTHESIOLOGY

## 2019-03-06 PROCEDURE — 99152 MOD SED SAME PHYS/QHP 5/>YRS: CPT

## 2019-03-06 PROCEDURE — 88311 DECALCIFY TISSUE: CPT | Performed by: INTERNAL MEDICINE

## 2019-03-06 PROCEDURE — 63710000001 HYDROCODONE-ACETAMINOPHEN 5-325 MG TABLET: Performed by: NURSE PRACTITIONER

## 2019-03-06 PROCEDURE — 88305 TISSUE EXAM BY PATHOLOGIST: CPT | Performed by: INTERNAL MEDICINE

## 2019-03-06 PROCEDURE — 77012 CT SCAN FOR NEEDLE BIOPSY: CPT

## 2019-03-06 PROCEDURE — 25010000002 MIDAZOLAM PER 1 MG: Performed by: NURSE PRACTITIONER

## 2019-03-06 PROCEDURE — 88185 FLOWCYTOMETRY/TC ADD-ON: CPT

## 2019-03-06 RX ORDER — HYDROCODONE BITARTRATE AND ACETAMINOPHEN 5; 325 MG/1; MG/1
1 TABLET ORAL ONCE AS NEEDED
Status: DISCONTINUED | OUTPATIENT
Start: 2019-03-06 | End: 2019-03-07 | Stop reason: HOSPADM

## 2019-03-06 RX ORDER — FENTANYL CITRATE 50 UG/ML
INJECTION, SOLUTION INTRAMUSCULAR; INTRAVENOUS
Status: COMPLETED | OUTPATIENT
Start: 2019-03-06 | End: 2019-03-06

## 2019-03-06 RX ORDER — LIDOCAINE HYDROCHLORIDE 10 MG/ML
5 INJECTION, SOLUTION INFILTRATION; PERINEURAL ONCE
Status: COMPLETED | OUTPATIENT
Start: 2019-03-06 | End: 2019-03-06

## 2019-03-06 RX ORDER — SODIUM CHLORIDE 0.9 % (FLUSH) 0.9 %
1-10 SYRINGE (ML) INJECTION AS NEEDED
Status: DISCONTINUED | OUTPATIENT
Start: 2019-03-06 | End: 2019-03-07 | Stop reason: HOSPADM

## 2019-03-06 RX ORDER — SODIUM CHLORIDE 9 MG/ML
40 INJECTION, SOLUTION INTRAVENOUS AS NEEDED
Status: DISCONTINUED | OUTPATIENT
Start: 2019-03-06 | End: 2019-03-07 | Stop reason: HOSPADM

## 2019-03-06 RX ORDER — SODIUM CHLORIDE 0.9 % (FLUSH) 0.9 %
3 SYRINGE (ML) INJECTION EVERY 12 HOURS SCHEDULED
Status: DISCONTINUED | OUTPATIENT
Start: 2019-03-06 | End: 2019-03-07 | Stop reason: HOSPADM

## 2019-03-06 RX ORDER — LIDOCAINE HYDROCHLORIDE AND EPINEPHRINE 10; 10 MG/ML; UG/ML
INJECTION, SOLUTION INFILTRATION; PERINEURAL
Status: DISPENSED
Start: 2019-03-06 | End: 2019-03-06

## 2019-03-06 RX ORDER — HYDROCODONE BITARTRATE AND ACETAMINOPHEN 5; 325 MG/1; MG/1
TABLET ORAL
Qty: 60 TABLET | Refills: 0 | Status: SHIPPED | OUTPATIENT
Start: 2019-03-06 | End: 2019-09-20 | Stop reason: HOSPADM

## 2019-03-06 RX ORDER — MIDAZOLAM HYDROCHLORIDE 1 MG/ML
INJECTION INTRAMUSCULAR; INTRAVENOUS
Status: COMPLETED | OUTPATIENT
Start: 2019-03-06 | End: 2019-03-06

## 2019-03-06 RX ADMIN — LIDOCAINE HYDROCHLORIDE 5 ML: 10 INJECTION, SOLUTION INFILTRATION; PERINEURAL at 11:10

## 2019-03-06 RX ADMIN — FENTANYL CITRATE 25 MCG: 50 INJECTION, SOLUTION INTRAMUSCULAR; INTRAVENOUS at 11:08

## 2019-03-06 RX ADMIN — MIDAZOLAM HYDROCHLORIDE 1 MG: 1 INJECTION, SOLUTION INTRAMUSCULAR; INTRAVENOUS at 10:53

## 2019-03-06 RX ADMIN — FENTANYL CITRATE 25 MCG: 50 INJECTION, SOLUTION INTRAMUSCULAR; INTRAVENOUS at 10:56

## 2019-03-06 RX ADMIN — SODIUM CHLORIDE 1000 ML: 9 INJECTION, SOLUTION INTRAVENOUS at 10:35

## 2019-03-06 RX ADMIN — MIDAZOLAM HYDROCHLORIDE 1 MG: 1 INJECTION, SOLUTION INTRAMUSCULAR; INTRAVENOUS at 11:06

## 2019-03-06 RX ADMIN — HYDROCODONE BITARTRATE AND ACETAMINOPHEN 1 TABLET: 5; 325 TABLET ORAL at 11:58

## 2019-03-06 NOTE — NURSING NOTE
NURSING PROGRESS NOTE: Patient arrived to [facility] at 0850 for scheduled Radiology procedure, with her son.  Seen by REY Colon and procedure discussed.  Patient given a gown to wear and then assisted to stretcher.  IV started and labs drawn.  History and medications reviewed.  Patient states has been NPO since 2300 on 3/5/19.  Blood glucose this AM was 109.  Comfortable on stretcher, awaiting procedure.  ANISH Kathleen

## 2019-03-06 NOTE — DISCHARGE INSTRUCTIONS
"  Call Dr. Elieser Headley, [facility] at [phone] if you have any problems or concerns.    We know you have a Choice in healthcare and appreciate you using [facility].  Our purpose is to provide you \"Excellent Care\".  We hope that you will always choose us in the future and continue to recommend us to your family and friends.              "

## 2019-03-06 NOTE — POST-PROCEDURE NOTE
Procedure  CT guided bone marrow biopsy and aspiration    Location : right posterior iliac crest     Complications: none    Specimen: 4ml of aspirate and 1 core with clot    Blood loss: minimal     Sedation: 50mcg fentanyl and 2 mg versed provided by Jennie Mendoza RN / VSS throughout    See full dictation for full report

## 2019-03-06 NOTE — NURSING NOTE
NURSING PROGRESS NOTE: Patient returned to [facility] post bone marrow biopsy at 1130.  To remain on bedrest x1 hour.  Po fluids offered.  VSS, see flow sheet for documentation. 1230: patient given Norco 3-325mg PO for c/o severe right shoulder pain.  States position during biopsy aggravated her torn rotator cuff.  At 1300 patient now rates her pain a level 4 from a 10.  Assisted off of stretcher and patient dressed.  Awaiting transportation home.  Sitting quietly in recliner without c/o.  AVS reviewed with patient and a copy provided.  Escorted to [facility] per W/C at 1400 and discharged home with her son.  Dressing to right posterior iliac crest area is C/D/I. D.ANISH Mendoza

## 2019-03-06 NOTE — H&P
Assessment/Plan     Assessment: Macrocytic anemia   Plan: CT guided Bone marrow biopsy and aspiration       1. Discussed the risk of bone marrow biopsy and aspiration including infection and bleeding,  and the risks of conscious sedation. The patient understands the risks, any and all questions were answered to the patient's satisfaction.  2. Plan procedure to be performed under CT guidance  3. Follow up: with Dr. Headley as previously scheduled.      Yadira Singh is a 78 year old female presents for scheduled bone marrow biopsy and aspiration to evaluate macrocytic anemia which has required been worsening over the past 6 months requiring multiple blood transfusions. Has a history of CKD stage 3 and has been treated with weekly Procrit. Laboratory values demonstrated elevated inflammatory markers. GI workup with EGD and colonoscopy did not demonstrate evidence of active GI bleeding. Bone marrow biopsy requested to evaluate bone marrow pathology.     The following portions of the patient's history were reviewed and updated as appropriate: current medications, past family history, past medical history, past social history, past surgical history and problem list.    Review of Systems  Constitutional: negative  Eyes: negative  Ears, nose, mouth, throat, and face: negative  Respiratory: negative except for requires nocturnal oxygen 2L and AARON occ wears   Cardiovascular: negative  Gastrointestinal: negative  Genitourinary:negative  Hematologic/lymphatic: negative  Musculoskeletal:negative except for muscle weakness and stiff joints  Neurological: negative except for dizziness and gait problems  Behavioral/Psych: negative     Objective     There were no vitals taken for this visit.    General:  alert, appears stated age and cooperative   Skin:  normal   Eyes: conjunctivae/corneas clear. PERRL, EOM's intact. Fundi benign.   Mouth: MMM no lesions   Lymph Nodes:  Cervical, supraclavicular, and axillary nodes  normal.   Lungs:  clear to auscultation bilaterally   Heart:  regular rate and rhythm, S1, S2 normal, no murmur, click, rub or gallop   Abdomen: soft, non-tender; bowel sounds normal; no masses,  no organomegaly   CVA:  absent   Genitourinary: defer exam   Extremities:  extremities normal, atraumatic, no cyanosis or edema   Neurologic:  negative   Psychiatric:  normal mood, behavior, speech, dress, and thought processes

## 2019-03-07 ENCOUNTER — INFUSION (OUTPATIENT)
Dept: ONCOLOGY | Facility: HOSPITAL | Age: 79
End: 2019-03-07

## 2019-03-07 ENCOUNTER — TELEPHONE (OUTPATIENT)
Dept: INTERNAL MEDICINE | Facility: CLINIC | Age: 79
End: 2019-03-07

## 2019-03-07 ENCOUNTER — LAB (OUTPATIENT)
Dept: LAB | Facility: HOSPITAL | Age: 79
End: 2019-03-07

## 2019-03-07 VITALS
TEMPERATURE: 97.7 F | SYSTOLIC BLOOD PRESSURE: 132 MMHG | OXYGEN SATURATION: 92 % | HEART RATE: 84 BPM | DIASTOLIC BLOOD PRESSURE: 79 MMHG

## 2019-03-07 DIAGNOSIS — E11.22 CKD STAGE 3 DUE TO TYPE 2 DIABETES MELLITUS (HCC): ICD-10-CM

## 2019-03-07 DIAGNOSIS — N18.30 CKD STAGE 3 DUE TO TYPE 2 DIABETES MELLITUS (HCC): ICD-10-CM

## 2019-03-07 DIAGNOSIS — D64.9 CHRONIC ANEMIA: ICD-10-CM

## 2019-03-07 DIAGNOSIS — D64.9 ANEMIA REQUIRING TRANSFUSIONS: Primary | ICD-10-CM

## 2019-03-07 LAB
BASOPHILS # BLD AUTO: 0.07 10*3/MM3 (ref 0–0.2)
BASOPHILS NFR BLD AUTO: 1.2 % (ref 0–1.5)
DEPRECATED RDW RBC AUTO: 74.8 FL (ref 37–54)
EOSINOPHIL # BLD AUTO: 0.31 10*3/MM3 (ref 0–0.4)
EOSINOPHIL NFR BLD AUTO: 5.4 % (ref 0.3–6.2)
ERYTHROCYTE [DISTWIDTH] IN BLOOD BY AUTOMATED COUNT: 19.9 % (ref 12.3–15.4)
HCT VFR BLD AUTO: 28.1 % (ref 34–46.6)
HGB BLD-MCNC: 9.4 G/DL (ref 12–15.9)
IMM GRANULOCYTES # BLD AUTO: 0.11 10*3/MM3 (ref 0–0.05)
IMM GRANULOCYTES NFR BLD AUTO: 1.9 % (ref 0–0.5)
LYMPHOCYTES # BLD AUTO: 2.21 10*3/MM3 (ref 0.7–3.1)
LYMPHOCYTES NFR BLD AUTO: 38.6 % (ref 19.6–45.3)
MCH RBC QN AUTO: 35.3 PG (ref 26.6–33)
MCHC RBC AUTO-ENTMCNC: 33.5 G/DL (ref 31.5–35.7)
MCV RBC AUTO: 105.6 FL (ref 79–97)
MONOCYTES # BLD AUTO: 0.29 10*3/MM3 (ref 0.1–0.9)
MONOCYTES NFR BLD AUTO: 5.1 % (ref 5–12)
NEUTROPHILS # BLD AUTO: 2.73 10*3/MM3 (ref 1.4–7)
NEUTROPHILS NFR BLD AUTO: 47.8 % (ref 42.7–76)
NRBC BLD AUTO-RTO: 0 /100 WBC (ref 0–0)
PLATELET # BLD AUTO: 347 10*3/MM3 (ref 140–450)
PMV BLD AUTO: 11.6 FL (ref 6–12)
RBC # BLD AUTO: 2.66 10*6/MM3 (ref 3.77–5.28)
WBC NRBC COR # BLD: 5.72 10*3/MM3 (ref 3.4–10.8)

## 2019-03-07 PROCEDURE — 63510000001 EPOETIN ALFA PER 1000 UNITS: Performed by: NURSE PRACTITIONER

## 2019-03-07 PROCEDURE — 96372 THER/PROPH/DIAG INJ SC/IM: CPT | Performed by: NURSE PRACTITIONER

## 2019-03-07 PROCEDURE — 85025 COMPLETE CBC W/AUTO DIFF WBC: CPT | Performed by: INTERNAL MEDICINE

## 2019-03-07 PROCEDURE — 36415 COLL VENOUS BLD VENIPUNCTURE: CPT

## 2019-03-07 RX ADMIN — ERYTHROPOIETIN 25000 UNITS: 20000 INJECTION, SOLUTION INTRAVENOUS; SUBCUTANEOUS at 09:25

## 2019-03-07 NOTE — TELEPHONE ENCOUNTER
vm   Full  Unable to leave message   Script  Sent        ----- Message from Gaviota Bowles MA sent at 3/6/2019  1:58 PM EST -----  Pt   Called needs refill on hydrocodone.  lov   1/30/19   Next   3/18/19   Needs johnny and hiral

## 2019-03-07 NOTE — PROGRESS NOTES
Quynh, [facility] with [facility] laboratory, called yesterday to let Dr. Headley know that cytogenetics testing will not be performed on the bone marrow biopsy specimen since it was collected in the wrong tube. Dr. Headley was informed and asked that the biopsy performing provider be informed to prevent future errors. Quynh said that she was following up with all involved staff and is making out a safe report as well.

## 2019-03-08 RX ORDER — HYDROCODONE BITARTRATE AND ACETAMINOPHEN 5; 325 MG/1; MG/1
TABLET ORAL
Qty: 60 TABLET | Refills: 0 | OUTPATIENT
Start: 2019-03-08

## 2019-03-14 ENCOUNTER — OFFICE VISIT (OUTPATIENT)
Dept: ORTHOPEDIC SURGERY | Facility: CLINIC | Age: 79
End: 2019-03-14

## 2019-03-14 ENCOUNTER — INFUSION (OUTPATIENT)
Dept: ONCOLOGY | Facility: HOSPITAL | Age: 79
End: 2019-03-14

## 2019-03-14 ENCOUNTER — APPOINTMENT (OUTPATIENT)
Dept: LAB | Facility: HOSPITAL | Age: 79
End: 2019-03-14

## 2019-03-14 VITALS
SYSTOLIC BLOOD PRESSURE: 117 MMHG | TEMPERATURE: 98.1 F | HEART RATE: 85 BPM | OXYGEN SATURATION: 95 % | DIASTOLIC BLOOD PRESSURE: 62 MMHG

## 2019-03-14 DIAGNOSIS — N18.30 CKD STAGE 3 DUE TO TYPE 2 DIABETES MELLITUS (HCC): ICD-10-CM

## 2019-03-14 DIAGNOSIS — D64.9 CHRONIC ANEMIA: ICD-10-CM

## 2019-03-14 DIAGNOSIS — M17.12 PRIMARY OSTEOARTHRITIS OF LEFT KNEE: ICD-10-CM

## 2019-03-14 DIAGNOSIS — D64.9 ANEMIA REQUIRING TRANSFUSIONS: Primary | ICD-10-CM

## 2019-03-14 DIAGNOSIS — E11.22 CKD STAGE 3 DUE TO TYPE 2 DIABETES MELLITUS (HCC): ICD-10-CM

## 2019-03-14 DIAGNOSIS — R52 PAIN: Primary | ICD-10-CM

## 2019-03-14 DIAGNOSIS — M19.012 PRIMARY LOCALIZED OSTEOARTHROSIS OF LEFT SHOULDER REGION: ICD-10-CM

## 2019-03-14 LAB
BASOPHILS # BLD AUTO: 0.1 10*3/MM3 (ref 0–0.2)
BASOPHILS NFR BLD AUTO: 1.6 % (ref 0–1.5)
DEPRECATED RDW RBC AUTO: 78 FL (ref 37–54)
EOSINOPHIL # BLD AUTO: 0.32 10*3/MM3 (ref 0–0.4)
EOSINOPHIL NFR BLD AUTO: 5.2 % (ref 0.3–6.2)
ERYTHROCYTE [DISTWIDTH] IN BLOOD BY AUTOMATED COUNT: 20.1 % (ref 12.3–15.4)
HCT VFR BLD AUTO: 27.7 % (ref 34–46.6)
HGB BLD-MCNC: 9.2 G/DL (ref 12–15.9)
IMM GRANULOCYTES # BLD AUTO: 0.06 10*3/MM3 (ref 0–0.05)
IMM GRANULOCYTES NFR BLD AUTO: 1 % (ref 0–0.5)
LYMPHOCYTES # BLD AUTO: 2.56 10*3/MM3 (ref 0.7–3.1)
LYMPHOCYTES NFR BLD AUTO: 41.5 % (ref 19.6–45.3)
MCH RBC QN AUTO: 35.4 PG (ref 26.6–33)
MCHC RBC AUTO-ENTMCNC: 33.2 G/DL (ref 31.5–35.7)
MCV RBC AUTO: 106.5 FL (ref 79–97)
MONOCYTES # BLD AUTO: 0.28 10*3/MM3 (ref 0.1–0.9)
MONOCYTES NFR BLD AUTO: 4.5 % (ref 5–12)
NEUTROPHILS # BLD AUTO: 2.85 10*3/MM3 (ref 1.4–7)
NEUTROPHILS NFR BLD AUTO: 46.2 % (ref 42.7–76)
NRBC BLD AUTO-RTO: 0 /100 WBC (ref 0–0)
PLATELET # BLD AUTO: 365 10*3/MM3 (ref 140–450)
PMV BLD AUTO: 11.3 FL (ref 6–12)
RBC # BLD AUTO: 2.6 10*6/MM3 (ref 3.77–5.28)
WBC NRBC COR # BLD: 6.17 10*3/MM3 (ref 3.4–10.8)

## 2019-03-14 PROCEDURE — 20610 DRAIN/INJ JOINT/BURSA W/O US: CPT | Performed by: NURSE PRACTITIONER

## 2019-03-14 PROCEDURE — 96372 THER/PROPH/DIAG INJ SC/IM: CPT | Performed by: NURSE PRACTITIONER

## 2019-03-14 PROCEDURE — 63510000001 EPOETIN ALFA PER 1000 UNITS: Performed by: NURSE PRACTITIONER

## 2019-03-14 PROCEDURE — 85025 COMPLETE CBC W/AUTO DIFF WBC: CPT | Performed by: NURSE PRACTITIONER

## 2019-03-14 PROCEDURE — 36415 COLL VENOUS BLD VENIPUNCTURE: CPT | Performed by: NURSE PRACTITIONER

## 2019-03-14 PROCEDURE — 99213 OFFICE O/P EST LOW 20 MIN: CPT | Performed by: NURSE PRACTITIONER

## 2019-03-14 RX ADMIN — BETAMETHASONE SODIUM PHOSPHATE AND BETAMETHASONE ACETATE 12 MG: 3; 3 INJECTION, SUSPENSION INTRA-ARTICULAR; INTRALESIONAL; INTRAMUSCULAR; SOFT TISSUE at 08:52

## 2019-03-14 RX ADMIN — LIDOCAINE HYDROCHLORIDE 4 ML: 20 INJECTION, SOLUTION EPIDURAL; INFILTRATION; INTRACAUDAL; PERINEURAL at 08:52

## 2019-03-14 RX ADMIN — ERYTHROPOIETIN 30000 UNITS: 20000 INJECTION, SOLUTION INTRAVENOUS; SUBCUTANEOUS at 10:20

## 2019-03-14 RX ADMIN — LIDOCAINE HYDROCHLORIDE 4 ML: 20 INJECTION, SOLUTION EPIDURAL; INFILTRATION; INTRACAUDAL; PERINEURAL at 08:51

## 2019-03-14 RX ADMIN — BETAMETHASONE SODIUM PHOSPHATE AND BETAMETHASONE ACETATE 12 MG: 3; 3 INJECTION, SUSPENSION INTRA-ARTICULAR; INTRALESIONAL; INTRAMUSCULAR; SOFT TISSUE at 08:51

## 2019-03-14 NOTE — PROGRESS NOTES
Subjective:     Patient ID: Joya Singh is a 78 y.o. female.    Chief Complaint:  Primary osteoarthritis left shoulder  Subacromial bursitis left shoulder  Primary osteoarthritis left knee  History of Present Illness  Joya Singh presents back to clinic for evaluation of left shoulder pain and left knee pain.  Maximal tenderness of the left shoulder over the subacromial aspect.  Symptoms worse within the last 2 weeks denies known specific injury to exacerbate symptoms.  Increased pain noted with reaching out in front and reaching up above head.  Denies that she is experiencing numbness or tingling radiating down the left upper extremity.  She is currently being treated for anemia of unknown origin recently receiving bone marrow transplant therefore is unable to proceed with surgery as she wishes at this time.  Rates pain at worst at an 8-9 out of a 10 aching throbbing in nature.  Mild symptom relief with rest of the left upper extremity.    She does like to be seen for right knee pain at today's visit.  Maximal tenderness over the medial joint line as well as the lateral joint line and patella.  Increased pain noted with all ambulatory activities, activities involving deep flexion, long periods of standing and walking.  Denies that the knee is locking, catching or giving away at this point in time.  Rates discomfort a 7 to an 8 out of a 10 aching in nature.  Denies that she is experiencing pain radiating down the left lower extremity.  Denies other concerns present at this time.       Social History     Occupational History     Employer: RETIRED   Tobacco Use   • Smoking status: Never Smoker   • Smokeless tobacco: Never Used   • Tobacco comment: CAFFEINE USE: NONE   Substance and Sexual Activity   • Alcohol use: No   • Drug use: No   • Sexual activity: Defer     Comment: EXERCISE - RARELY      Past Medical History:   Diagnosis Date   • Abnormal urination    • Allergic rhinitis    • Anemia    • Anxiety    • Appetite  absent    • Arthritis    • Asthma    • Back pain    • Bell's palsy    • Black tarry stools    • Blood in stool    • CKD (chronic kidney disease) stage 3, GFR 30-59 ml/min (CMS/[facility])    • Cough    • Depression    • Diabetes mellitus (CMS/[facility])     LAST A1C 6   • Diabetic gastroparesis (CMS/[facility]) 2/19/2016   • Difficulty walking    • Dizziness 2018   • Excessive urination at night    • Fatigue    • Fever, low grade    • Frequent urination    • GERD (gastroesophageal reflux disease)    • GI bleed    • H/O blood clots     LEFT LEG 7 OR 8 YEARS AGO   • Heat intolerance    • History of fall 10/2018   • History of prior pregnancies     x8, miscarriage 5   • History of transfusion 11/2018    due to anemia   • Hyperlipidemia    • Hypertension    • Hypothyroidism    • Nausea & vomiting    • Normal coronary arteries     by cath 2013   • AARON (obstructive sleep apnea)     DOESNT WEAR REGULARLY   • PONV (postoperative nausea and vomiting)    • Skin cancer    • Stroke (CMS/[facility])     Several mini-strokes   • Swelling    • TIA (transient ischemic attack)     LAST TIA JULY 2017   • Urination pain    • UTI (urinary tract infection)     Dec 2018 and Jan 2019   • Weight loss      Past Surgical History:   Procedure Laterality Date   • BACK SURGERY      HARDWARE   • CHOLECYSTECTOMY      OPEN   • COLONOSCOPY  2011    due for repeat in 2021   • COLONOSCOPY N/A 10/28/2018    Procedure: COLONOSCOPY;  Surgeon: Emmanuel Rogers MD;  Location: [facility] OR;  Service: Gastroenterology   • ENDOSCOPY N/A 10/26/2018    Procedure: ESOPHAGOGASTRODUODENOSCOPY;  Surgeon: Emmanuel Rogers MD;  Location: [facility] OR;  Service: Gastroenterology   • HYSTERECTOMY      PARTIAL    • NECK SURGERY     • SPINE SURGERY     • TUMOR REMOVAL Left     Leg   • UPPER GASTROINTESTINAL ENDOSCOPY  2014    gastritis.  done by dr. hastings       Family History   Problem Relation Age of Onset   • Lupus Mother    • Heart failure Mother 59   • Heart disease Other     • Hypertension Other    • Heart attack Father          Review of Systems   Constitutional: Negative for chills, diaphoresis, fever and unexpected weight change.   HENT: Negative for hearing loss, nosebleeds, sore throat and tinnitus.    Eyes: Negative for pain and visual disturbance.   Respiratory: Negative for cough, shortness of breath and wheezing.    Cardiovascular: Negative for chest pain and palpitations.   Gastrointestinal: Negative for abdominal pain, diarrhea, nausea and vomiting.   Endocrine: Negative for cold intolerance, heat intolerance and polydipsia.   Genitourinary: Negative for difficulty urinating, dysuria and hematuria.   Musculoskeletal: Positive for arthralgias. Negative for joint swelling and myalgias.   Skin: Negative for rash and wound.   Allergic/Immunologic: Negative for environmental allergies.   Neurological: Negative for dizziness, syncope and numbness.   Hematological: Does not bruise/bleed easily.   Psychiatric/Behavioral: Negative for dysphoric mood and sleep disturbance. The patient is not nervous/anxious.    All other systems reviewed and are negative.          Objective:  Physical Exam    General: No acute distress.  Eyes: conjunctiva clear; pupils equally round and reactive  ENT: external ears and nose atraumatic; oropharynx clear  CV: no peripheral edema  Resp: normal respiratory effort  Skin: no rashes or wounds; normal turgor  Psych: mood and affect appropriate; recent and remote memory intact    There were no vitals filed for this visit.  There were no vitals filed for this visit.  There is no height or weight on file to calculate BMI.      Left Knee Exam     Tenderness   The patient is experiencing tenderness in the medial joint line and patella.    Range of Motion   Extension: 5   Flexion: 120     Tests   Varus: negative Valgus: negative  Lachman:  Anterior - 1+        Other   Erythema: absent  Sensation: normal  Pulse: present  Swelling: moderate    Comments:  Positive  active patellar compression test.      Left Shoulder Exam     Tenderness   The patient is experiencing tenderness in the acromion.    Range of Motion   External rotation: 20   Forward flexion: 70     Tests   Mejia test: positive  Impingement: positive    Other   Erythema: absent  Sensation: normal  Pulse: present     Comments:  Pain present over acromion, anterior and posterior joint lines. Crepitus present with active and passive range of motion activities.   Muscle strength testin-/5           Assessment:        1. Pain    2. Chronic anemia    3. Primary osteoarthritis of left knee    4. Primary localized osteoarthrosis of left shoulder region           Plan:  1. Discussed plan of care with patient.  She does wish to proceed with corticosteroid injection to the left subacromial bursa and the left knee at this time.  We will plan to see her back in approximately 3 months.  Patient verbalized understanding of all information and agrees with plan of care.  Denies all other concerns that she has at this time.  Large Joint Arthrocentesis: L subacromial bursa  Date/Time: 3/14/2019 8:51 AM  Consent given by: patient  Site marked: site marked  Timeout: Immediately prior to procedure a time out was called to verify the correct patient, procedure, equipment, support staff and site/side marked as required   Supporting Documentation  Indications: pain   Procedure Details  Location: shoulder - L subacromial bursa  Preparation: Patient was prepped and draped in the usual sterile fashion  Needle size: 22 G  Medications administered: 4 mL lidocaine PF 2% 2 %; 12 mg betamethasone acetate-betamethasone sodium phosphate 6 (3-3) MG/ML  Patient tolerance: patient tolerated the procedure well with no immediate complications    Large Joint Arthrocentesis: L knee  Date/Time: 3/14/2019 8:52 AM  Consent given by: patient  Site marked: site marked  Timeout: Immediately prior to procedure a time out was called to verify the correct  patient, procedure, equipment, support staff and site/side marked as required   Supporting Documentation  Indications: pain   Procedure Details  Location: knee - L knee  Needle size: 22 G  Approach: superior  Medications administered: 4 mL lidocaine PF 2% 2 %; 12 mg betamethasone acetate-betamethasone sodium phosphate 6 (3-3) MG/ML  Patient tolerance: patient tolerated the procedure well with no immediate complications        Orders:  Orders Placed This Encounter   Procedures   • Large Joint Arthrocentesis: R subacromial bursa   • Large Joint Arthrocentesis: L knee   • CBC Auto Differential   • Scan Slide   • CBC & Differential       I ordered and reviewed the KENDRICK today.     Dictated utilizing Dragon dictation

## 2019-03-15 RX ORDER — LIDOCAINE HYDROCHLORIDE 20 MG/ML
4 INJECTION, SOLUTION EPIDURAL; INFILTRATION; INTRACAUDAL; PERINEURAL
Status: COMPLETED | OUTPATIENT
Start: 2019-03-14 | End: 2019-03-14

## 2019-03-15 RX ORDER — BETAMETHASONE SODIUM PHOSPHATE AND BETAMETHASONE ACETATE 3; 3 MG/ML; MG/ML
12 INJECTION, SUSPENSION INTRA-ARTICULAR; INTRALESIONAL; INTRAMUSCULAR; SOFT TISSUE
Status: COMPLETED | OUTPATIENT
Start: 2019-03-14 | End: 2019-03-14

## 2019-03-15 RX ORDER — POTASSIUM CHLORIDE 750 MG/1
TABLET, FILM COATED, EXTENDED RELEASE ORAL
Qty: 90 TABLET | Refills: 2 | Status: SHIPPED | OUTPATIENT
Start: 2019-03-15 | End: 2020-01-15

## 2019-03-18 ENCOUNTER — OFFICE VISIT (OUTPATIENT)
Dept: INTERNAL MEDICINE | Facility: CLINIC | Age: 79
End: 2019-03-18

## 2019-03-18 VITALS
RESPIRATION RATE: 14 BRPM | HEART RATE: 104 BPM | BODY MASS INDEX: 36.81 KG/M2 | SYSTOLIC BLOOD PRESSURE: 112 MMHG | HEIGHT: 62 IN | OXYGEN SATURATION: 94 % | DIASTOLIC BLOOD PRESSURE: 60 MMHG | TEMPERATURE: 97.8 F

## 2019-03-18 DIAGNOSIS — E11.40 TYPE 2 DIABETES MELLITUS WITH DIABETIC NEUROPATHY, WITH LONG-TERM CURRENT USE OF INSULIN (HCC): Primary | ICD-10-CM

## 2019-03-18 DIAGNOSIS — Z79.4 TYPE 2 DIABETES MELLITUS WITH DIABETIC NEUROPATHY, WITH LONG-TERM CURRENT USE OF INSULIN (HCC): Primary | ICD-10-CM

## 2019-03-18 DIAGNOSIS — N39.0 RECURRENT UTI: ICD-10-CM

## 2019-03-18 DIAGNOSIS — D64.9 CHRONIC ANEMIA: ICD-10-CM

## 2019-03-18 DIAGNOSIS — I10 ESSENTIAL HYPERTENSION: ICD-10-CM

## 2019-03-18 DIAGNOSIS — E11.22 CKD STAGE 3 DUE TO TYPE 2 DIABETES MELLITUS (HCC): ICD-10-CM

## 2019-03-18 DIAGNOSIS — R82.998 LEUKOCYTES IN URINE: ICD-10-CM

## 2019-03-18 DIAGNOSIS — D64.9 ACUTE ON CHRONIC ANEMIA: ICD-10-CM

## 2019-03-18 DIAGNOSIS — N18.30 CKD STAGE 3 DUE TO TYPE 2 DIABETES MELLITUS (HCC): ICD-10-CM

## 2019-03-18 DIAGNOSIS — E78.2 MIXED HYPERLIPIDEMIA: ICD-10-CM

## 2019-03-18 DIAGNOSIS — D62 ACUTE BLOOD LOSS ANEMIA: ICD-10-CM

## 2019-03-18 LAB
BILIRUB BLD-MCNC: NEGATIVE MG/DL
CLARITY, POC: ABNORMAL
COLOR UR: YELLOW
GLUCOSE UR STRIP-MCNC: ABNORMAL MG/DL
KETONES UR QL: NEGATIVE
LEUKOCYTE EST, POC: ABNORMAL
NITRITE UR-MCNC: POSITIVE MG/ML
PH UR: 5 [PH] (ref 5–8)
PROT UR STRIP-MCNC: NEGATIVE MG/DL
RBC # UR STRIP: NEGATIVE /UL
SP GR UR: 1.01 (ref 1–1.03)
UROBILINOGEN UR QL: NORMAL

## 2019-03-18 PROCEDURE — 99214 OFFICE O/P EST MOD 30 MIN: CPT | Performed by: INTERNAL MEDICINE

## 2019-03-18 PROCEDURE — 81003 URINALYSIS AUTO W/O SCOPE: CPT | Performed by: INTERNAL MEDICINE

## 2019-03-18 NOTE — PROGRESS NOTES
Joya Singh is a 78 y.o. female, who presents with a chief complaint of follow up for diabetes and multiple medical issues      HPI   Pt here with her son for follow up.      Since her last OV she has had a bone marrow biopsy.  She has not gotten results yet from dr. Headley.  She has chronic anemia.      DM - glucoses up some.  She has been struggling with diet.  She doesn't want her a1c checked bc she knows it will be bad. No hypoglycemia.  She is on tresiba.  She is taking tresiba 52 units daily and novolog 10 units tid with meals if glucose over 150.      Hx dvt - off anticoagulation bc of GI bleeding/anemia    HLD - on statin.  No cramps or myalgias.      htn w orthostatic hypotension - pt with chronic htn but often with dizziness and hypotension.     cindi - not using cpap consistently.      The following portions of the patient's history were reviewed and updated as appropriate: allergies, current medications, past family history, past medical history, past social history, past surgical history and problem list.    Allergies: Lisinopril; Baclofen; Codeine; Morphine; and Penicillins    Review of Systems   Constitutional: Negative.    HENT: Negative.    Eyes: Negative.    Respiratory: Negative.    Cardiovascular: Negative.    Gastrointestinal: Negative.    Endocrine: Negative.    Genitourinary: Negative.    Musculoskeletal: Negative.    Skin: Negative.    Allergic/Immunologic: Negative.    Neurological: Negative.    Hematological: Negative.    Psychiatric/Behavioral: Negative.    All other systems reviewed and are negative.            Wt Readings from Last 3 Encounters:   03/06/19 91.3 kg (201 lb 4.5 oz)   02/05/19 91.3 kg (201 lb 4.8 oz)   01/30/19 90.7 kg (199 lb 15.3 oz)     Temp Readings from Last 3 Encounters:   03/18/19 97.8 °F (36.6 °C) (Oral)   03/14/19 98.1 °F (36.7 °C)   03/07/19 97.7 °F (36.5 °C)     BP Readings from Last 3 Encounters:   03/18/19 112/60   03/14/19 117/62   03/07/19 132/79     Pulse  Readings from Last 3 Encounters:   03/18/19 104   03/14/19 85   03/07/19 84     Body mass index is 36.81 kg/m².  @LASTSAO2(3)@    Physical Exam   Constitutional: She is oriented to person, place, and time. She appears well-developed and well-nourished. No distress.   HENT:   Head: Normocephalic and atraumatic.   Right Ear: External ear normal.   Left Ear: External ear normal.   Nose: Nose normal.   Mouth/Throat: Oropharynx is clear and moist.   Eyes: Conjunctivae and EOM are normal. Pupils are equal, round, and reactive to light.   Neck: Normal range of motion. Neck supple.   Cardiovascular: Normal rate, regular rhythm, normal heart sounds and intact distal pulses.   Pulmonary/Chest: Effort normal and breath sounds normal. No respiratory distress. She has no wheezes.   Musculoskeletal: Normal range of motion.   Normal gait   Neurological: She is alert and oriented to person, place, and time.   Skin: Skin is warm and dry.   Psychiatric: She has a normal mood and affect. Her behavior is normal. Judgment and thought content normal.   Nursing note and vitals reviewed.      Results for orders placed or performed in visit on 03/18/19   Comprehensive Metabolic Panel   Result Value Ref Range    Glucose 512 (C) 65 - 99 mg/dL    BUN 38 (H) 8 - 23 mg/dL    Creatinine 2.16 (H) 0.57 - 1.00 mg/dL    eGFR Non African Am 22 (L) >60 mL/min/1.73    eGFR African Am 27 (L) >60 mL/min/1.73    BUN/Creatinine Ratio 17.6 7.0 - 25.0    Sodium 137 136 - 145 mmol/L    Potassium 4.9 3.5 - 5.2 mmol/L    Chloride 95 (L) 98 - 107 mmol/L    Total CO2 24.7 22.0 - 29.0 mmol/L    Calcium 9.4 8.6 - 10.5 mg/dL    Total Protein 6.8 6.0 - 8.5 g/dL    Albumin 4.30 3.50 - 5.20 g/dL    Globulin 2.5 gm/dL    A/G Ratio 1.7 g/dL    Total Bilirubin 0.7 0.2 - 1.2 mg/dL    Alkaline Phosphatase 144 (H) 39 - 117 U/L    AST (SGOT) 7 1 - 32 U/L    ALT (SGPT) 10 1 - 33 U/L   T4, Free   Result Value Ref Range    Free T4 1.41 0.93 - 1.70 ng/dL   TSH   Result Value Ref  Range    TSH 1.880 0.270 - 4.200 mIU/mL   Lipid Panel With LDL / HDL Ratio   Result Value Ref Range    Total Cholesterol 86 0 - 200 mg/dL    Triglycerides 138 0 - 150 mg/dL    HDL Cholesterol 38 (L) 40 - 60 mg/dL    VLDL Cholesterol 27.6 5 - 40 mg/dL    LDL Cholesterol  20 0 - 100 mg/dL    LDL/HDL Ratio 0.54    Hemoglobin A1c   Result Value Ref Range    Hemoglobin A1C 8.60 (H) 4.80 - 5.60 %   POCT urinalysis dipstick, automated   Result Value Ref Range    Color Yellow Yellow, Straw, Dark Yellow, Mehnaz    Clarity, UA Cloudy (A) Clear    Specific Gravity  1.010 1.005 - 1.030    pH, Urine 5.0 5.0 - 8.0    Leukocytes Trace (A) Negative    Nitrite, UA Positive (A) Negative    Protein, POC Negative Negative mg/dL    Glucose, UA >=1000 mg/dL (3+) (A) Negative, 1000 mg/dL (3+) mg/dL    Ketones, UA Negative Negative    Urobilinogen, UA Normal Normal    Bilirubin Negative Negative    Blood, UA Negative Negative   CBC & Differential   Result Value Ref Range    WBC 10.70 3.40 - 10.80 10*3/mm3    RBC 2.57 (L) 3.77 - 5.28 10*6/mm3    Hemoglobin 9.2 (L) 12.0 - 15.9 g/dL    Hematocrit 29.1 (L) 34.0 - 46.6 %    .2 (H) 79.0 - 97.0 fL    MCH 35.8 (H) 26.6 - 33.0 pg    MCHC 31.6 31.5 - 35.7 g/dL    RDW 21.0 (H) 12.3 - 15.4 %    Platelets 442 140 - 450 10*3/mm3    Neutrophil Rel % 57.8 42.7 - 76.0 %    Lymphocyte Rel % 31.0 19.6 - 45.3 %    Monocyte Rel % 5.2 5.0 - 12.0 %    Eosinophil Rel % 3.7 0.3 - 6.2 %    Basophil Rel % 1.1 0.0 - 1.5 %    Neutrophils Absolute 6.17 1.40 - 7.00 10*3/mm3    Lymphocytes Absolute 3.32 (H) 0.70 - 3.10 10*3/mm3    Monocytes Absolute 0.56 0.10 - 0.90 10*3/mm3    Eosinophils Absolute 0.40 0.00 - 0.40 10*3/mm3    Basophils Absolute 0.12 0.00 - 0.20 10*3/mm3    Immature Granulocyte Rel % 1.2 (H) 0.0 - 0.5 %    Immature Grans Absolute 0.13 (H) 0.00 - 0.05 10*3/mm3   Manual Differential   Result Value Ref Range    Differential Comment Comment     Comment Comment     Plt Comment Comment             Diagnoses and all orders for this visit:    Type 2 diabetes mellitus with diabetic neuropathy, with long-term current use of insulin (CMS/[facility]) - discussed slowly increasing tresiba.  Increase by 2 units now then another 2 units if fasting glucoses remain above 150.      -     Comprehensive Metabolic Panel  -     CBC & Differential  -     T4, Free  -     TSH  -     Lipid Panel With LDL / HDL Ratio  -     Hemoglobin A1c    Essential hypertension  -     Comprehensive Metabolic Panel  -     CBC & Differential  -     T4, Free  -     TSH  -     Lipid Panel With LDL / HDL Ratio    Chronic anemia  -     CBC & Differential    CKD stage 3 due to type 2 diabetes mellitus (CMS/[facility])  -     Comprehensive Metabolic Panel    Acute blood loss anemia    Acute on chronic anemia    Mixed hyperlipidemia  -     Comprehensive Metabolic Panel  -     Lipid Panel With LDL / HDL Ratio      Addendum: 3/19 Urine c/w infection will call in keflex.  Culture pending.  Creatinine and glucoses elevated.  Pt needs to push fluids.  Adjust insulin as above and treat infection to help get glucoses down.      Outpatient Medications Prior to Visit   Medication Sig Dispense Refill   • ACCU-CHEK FASTCLIX LANCETS misc TEST 3-4 TIMES DAILY AS DIRECTED 400 each 3   • ACCU-CHEK SMARTVIEW test strip TEST blood sugar three times daily OR AS DIRECTED 300 each 3   • acetaminophen (TYLENOL) 325 MG tablet Take 2 tablets by mouth Every 6 (Six) Hours As Needed for Mild Pain . OTC product 40 tablet 0   • Alcohol Swabs (B-D SINGLE USE SWABS REGULAR) pads      • atorvastatin (LIPITOR) 10 MG tablet TAKE ONE TABLET BY MOUTH AT BEDTIME 90 tablet 1   • Blood Glucose Monitoring Suppl (ACCU-CHEK KENNETH SMARTVIEW) w/Device kit TEST blood sugar three times daily or as directed 1 kit 0   • cyclobenzaprine (FLEXERIL) 10 MG tablet TAKE ONE TABLET BY MOUTH TWICE DAILY as needed for muscle spasms 30 tablet 0   • diphenhydrAMINE (BENADRYL) 25 mg capsule Take 25 mg by mouth Every 6  "(Six) Hours As Needed for Itching.     • DULoxetine (CYMBALTA) 30 MG capsule Take 1 capsule by mouth 2 (Two) Times a Day. 60 capsule 5   • furosemide (LASIX) 20 MG tablet TAKE ONE (1) TABLET ORALLY (BY MOUTH) ONCE DAILY 90 tablet 1   • gabapentin (NEURONTIN) 400 MG capsule 400 mg AM, 400 mg NOON, 800 mg  capsule 5   • HYDROcodone-acetaminophen (NORCO) 5-325 MG per tablet Take one po bid    Prn  Moderate pain 60 tablet 0   • insulin aspart (novoLOG FLEXPEN) 100 UNIT/ML solution pen-injector sc pen Inject 25 Units under the skin into the appropriate area as directed Every Morning Before Breakfast. 30 units with breakfast 35 units with lunch 35 units with dinner      • insulin aspart (novoLOG) 100 UNIT/ML injection Inject 15 Units under the skin into the appropriate area as directed Daily With Breakfast. 15 units with breakfast 25 units with lunch 25 units with dinner     • Insulin Degludec (TRESIBA FLEXTOUCH) 200 UNIT/ML solution pen-injector Inject 50 Units under the skin into the appropriate area as directed every night at bedtime.     • levothyroxine (SYNTHROID) 88 MCG tablet Take 1 tablet by mouth Daily. 90 tablet 1   • midodrine (PROAMATINE) 2.5 MG tablet TAKE ONE TABLET BY MOUTH EVERY MORNING AND ONE tablet SIX hours LATER 60 tablet 5   • Multiple Vitamins-Minerals (CENTRUM SILVER PO) Take  by mouth Daily.     • Needle, Disp, (BD DISP NEEDLES) 30G X 1/2\" misc To be used 3 times daily with Novolog Flexpen. 100 each 5   • O2 (OXYGEN) Inhale 2 L/min Every Night. Uses 2L  overnight only     • omeprazole (priLOSEC) 40 MG capsule Take one po daily 90 capsule 1   • potassium chloride (K-DUR) 10 MEQ CR tablet TAKE ONE (1) TABLET ORALLY (BY MOUTH) ONCE DAILY 90 tablet 2   • potassium chloride (MICRO-K) 10 MEQ CR capsule Take 10 mEq by mouth Every Morning.     • promethazine (PHENERGAN) 12.5 MG tablet 1/2 to 1 tablet every 6 hours as needed for nausea 20 tablet 0     No facility-administered medications prior to " visit.      New Medications Ordered This Visit   Medications   • cephalexin (KEFLEX) 500 MG capsule     Sig: Take 1 capsule by mouth 2 (Two) Times a Day for 7 days.     Dispense:  14 capsule     Refill:  0     [unfilled]  There are no discontinued medications.      Return in about 1 month (around 4/18/2019) for Recheck.

## 2019-03-19 LAB
ALBUMIN SERPL-MCNC: 4.3 G/DL (ref 3.5–5.2)
ALBUMIN/GLOB SERPL: 1.7 G/DL
ALP SERPL-CCNC: 144 U/L (ref 39–117)
ALT SERPL-CCNC: 10 U/L (ref 1–33)
AST SERPL-CCNC: 7 U/L (ref 1–32)
BASOPHILS # BLD AUTO: 0.12 10*3/MM3 (ref 0–0.2)
BASOPHILS NFR BLD AUTO: 1.1 % (ref 0–1.5)
BILIRUB SERPL-MCNC: 0.7 MG/DL (ref 0.2–1.2)
BUN SERPL-MCNC: 38 MG/DL (ref 8–23)
BUN/CREAT SERPL: 17.6 (ref 7–25)
CALCIUM SERPL-MCNC: 9.4 MG/DL (ref 8.6–10.5)
CHLORIDE SERPL-SCNC: 95 MMOL/L (ref 98–107)
CHOLEST SERPL-MCNC: 86 MG/DL (ref 0–200)
CO2 SERPL-SCNC: 24.7 MMOL/L (ref 22–29)
CREAT SERPL-MCNC: 2.16 MG/DL (ref 0.57–1)
CYTO UR: NORMAL
DIFFERENTIAL COMMENT: NORMAL
EOSINOPHIL # BLD AUTO: 0.4 10*3/MM3 (ref 0–0.4)
EOSINOPHIL NFR BLD AUTO: 3.7 % (ref 0.3–6.2)
ERYTHROCYTE [DISTWIDTH] IN BLOOD BY AUTOMATED COUNT: 21 % (ref 12.3–15.4)
GLOBULIN SER CALC-MCNC: 2.5 GM/DL
GLUCOSE SERPL-MCNC: 512 MG/DL (ref 65–99)
HBA1C MFR BLD: 8.6 % (ref 4.8–5.6)
HCT VFR BLD AUTO: 29.1 % (ref 34–46.6)
HDLC SERPL-MCNC: 38 MG/DL (ref 40–60)
HGB BLD-MCNC: 9.2 G/DL (ref 12–15.9)
IMM GRANULOCYTES # BLD AUTO: 0.13 10*3/MM3 (ref 0–0.05)
IMM GRANULOCYTES NFR BLD AUTO: 1.2 % (ref 0–0.5)
LAB AP CASE REPORT: NORMAL
LAB AP CLINICAL INFORMATION: NORMAL
LAB AP DIAGNOSIS COMMENT: NORMAL
LAB AP SPECIAL STAINS: NORMAL
LDLC SERPL CALC-MCNC: 20 MG/DL (ref 0–100)
LDLC/HDLC SERPL: 0.54 {RATIO}
LYMPHOCYTES # BLD AUTO: 3.32 10*3/MM3 (ref 0.7–3.1)
LYMPHOCYTES NFR BLD AUTO: 31 % (ref 19.6–45.3)
MCH RBC QN AUTO: 35.8 PG (ref 26.6–33)
MCHC RBC AUTO-ENTMCNC: 31.6 G/DL (ref 31.5–35.7)
MCV RBC AUTO: 113.2 FL (ref 79–97)
MONOCYTES # BLD AUTO: 0.56 10*3/MM3 (ref 0.1–0.9)
MONOCYTES NFR BLD AUTO: 5.2 % (ref 5–12)
NEUTROPHILS # BLD AUTO: 6.17 10*3/MM3 (ref 1.4–7)
NEUTROPHILS NFR BLD AUTO: 57.8 % (ref 42.7–76)
PATH REPORT.ADDENDUM SPEC: NORMAL
PATH REPORT.FINAL DX SPEC: NORMAL
PATH REPORT.GROSS SPEC: NORMAL
PLATELET # BLD AUTO: 442 10*3/MM3 (ref 140–450)
PLATELET BLD QL SMEAR: NORMAL
POTASSIUM SERPL-SCNC: 4.9 MMOL/L (ref 3.5–5.2)
PROT SERPL-MCNC: 6.8 G/DL (ref 6–8.5)
RBC # BLD AUTO: 2.57 10*6/MM3 (ref 3.77–5.28)
RBC MORPH BLD: NORMAL
SODIUM SERPL-SCNC: 137 MMOL/L (ref 136–145)
T4 FREE SERPL-MCNC: 1.41 NG/DL (ref 0.93–1.7)
TRIGL SERPL-MCNC: 138 MG/DL (ref 0–150)
TSH SERPL DL<=0.005 MIU/L-ACNC: 1.88 MIU/ML (ref 0.27–4.2)
VLDLC SERPL CALC-MCNC: 27.6 MG/DL (ref 5–40)
WBC # BLD AUTO: 10.7 10*3/MM3 (ref 3.4–10.8)

## 2019-03-19 RX ORDER — CEPHALEXIN 500 MG/1
500 CAPSULE ORAL 2 TIMES DAILY
Qty: 14 CAPSULE | Refills: 0 | Status: SHIPPED | OUTPATIENT
Start: 2019-03-19 | End: 2019-03-26

## 2019-03-20 LAB
BACTERIA UR CULT: ABNORMAL
BACTERIA UR CULT: ABNORMAL
OTHER ANTIBIOTIC SUSC ISLT: ABNORMAL

## 2019-03-21 ENCOUNTER — LAB (OUTPATIENT)
Dept: LAB | Facility: HOSPITAL | Age: 79
End: 2019-03-21

## 2019-03-21 ENCOUNTER — INFUSION (OUTPATIENT)
Dept: ONCOLOGY | Facility: HOSPITAL | Age: 79
End: 2019-03-21

## 2019-03-21 VITALS
TEMPERATURE: 98.7 F | HEART RATE: 86 BPM | DIASTOLIC BLOOD PRESSURE: 66 MMHG | SYSTOLIC BLOOD PRESSURE: 102 MMHG | OXYGEN SATURATION: 94 %

## 2019-03-21 DIAGNOSIS — N18.30 CKD STAGE 3 DUE TO TYPE 2 DIABETES MELLITUS (HCC): ICD-10-CM

## 2019-03-21 DIAGNOSIS — E11.22 CKD STAGE 3 DUE TO TYPE 2 DIABETES MELLITUS (HCC): ICD-10-CM

## 2019-03-21 DIAGNOSIS — D64.9 ANEMIA REQUIRING TRANSFUSIONS: Primary | ICD-10-CM

## 2019-03-21 DIAGNOSIS — D64.9 CHRONIC ANEMIA: ICD-10-CM

## 2019-03-21 LAB
BASOPHILS # BLD AUTO: 0.14 10*3/MM3 (ref 0–0.2)
BASOPHILS NFR BLD AUTO: 1.5 % (ref 0–1.5)
DEPRECATED RDW RBC AUTO: 81.9 FL (ref 37–54)
EOSINOPHIL # BLD AUTO: 0.52 10*3/MM3 (ref 0–0.4)
EOSINOPHIL NFR BLD AUTO: 5.6 % (ref 0.3–6.2)
ERYTHROCYTE [DISTWIDTH] IN BLOOD BY AUTOMATED COUNT: 21.2 % (ref 12.3–15.4)
HCT VFR BLD AUTO: 26.8 % (ref 34–46.6)
HGB BLD-MCNC: 9 G/DL (ref 12–15.9)
IMM GRANULOCYTES # BLD AUTO: 0.11 10*3/MM3 (ref 0–0.05)
IMM GRANULOCYTES NFR BLD AUTO: 1.2 % (ref 0–0.5)
LYMPHOCYTES # BLD AUTO: 2.27 10*3/MM3 (ref 0.7–3.1)
LYMPHOCYTES NFR BLD AUTO: 24.3 % (ref 19.6–45.3)
MCH RBC QN AUTO: 36 PG (ref 26.6–33)
MCHC RBC AUTO-ENTMCNC: 33.6 G/DL (ref 31.5–35.7)
MCV RBC AUTO: 107.2 FL (ref 79–97)
MONOCYTES # BLD AUTO: 0.38 10*3/MM3 (ref 0.1–0.9)
MONOCYTES NFR BLD AUTO: 4.1 % (ref 5–12)
NEUTROPHILS # BLD AUTO: 5.93 10*3/MM3 (ref 1.4–7)
NEUTROPHILS NFR BLD AUTO: 63.3 % (ref 42.7–76)
NRBC BLD AUTO-RTO: 0 /100 WBC (ref 0–0)
PLATELET # BLD AUTO: 400 10*3/MM3 (ref 140–450)
PMV BLD AUTO: 12.2 FL (ref 6–12)
RBC # BLD AUTO: 2.5 10*6/MM3 (ref 3.77–5.28)
WBC NRBC COR # BLD: 9.35 10*3/MM3 (ref 3.4–10.8)

## 2019-03-21 PROCEDURE — 96372 THER/PROPH/DIAG INJ SC/IM: CPT | Performed by: NURSE PRACTITIONER

## 2019-03-21 PROCEDURE — 36415 COLL VENOUS BLD VENIPUNCTURE: CPT

## 2019-03-21 PROCEDURE — 63510000001 EPOETIN ALFA PER 1000 UNITS: Performed by: NURSE PRACTITIONER

## 2019-03-21 PROCEDURE — 85025 COMPLETE CBC W/AUTO DIFF WBC: CPT | Performed by: INTERNAL MEDICINE

## 2019-03-21 RX ADMIN — ERYTHROPOIETIN 30000 UNITS: 20000 INJECTION, SOLUTION INTRAVENOUS; SUBCUTANEOUS at 09:27

## 2019-03-26 RX ORDER — HYDROCODONE BITARTRATE AND ACETAMINOPHEN 5; 325 MG/1; MG/1
TABLET ORAL
Qty: 60 TABLET | OUTPATIENT
Start: 2019-03-26

## 2019-03-27 ENCOUNTER — OFFICE VISIT (OUTPATIENT)
Dept: ONCOLOGY | Facility: CLINIC | Age: 79
End: 2019-03-27

## 2019-03-27 ENCOUNTER — INFUSION (OUTPATIENT)
Dept: ONCOLOGY | Facility: HOSPITAL | Age: 79
End: 2019-03-27

## 2019-03-27 ENCOUNTER — LAB (OUTPATIENT)
Dept: LAB | Facility: HOSPITAL | Age: 79
End: 2019-03-27

## 2019-03-27 VITALS
TEMPERATURE: 97.7 F | OXYGEN SATURATION: 93 % | HEART RATE: 84 BPM | RESPIRATION RATE: 14 BRPM | BODY MASS INDEX: 36.95 KG/M2 | HEIGHT: 62 IN | DIASTOLIC BLOOD PRESSURE: 61 MMHG | SYSTOLIC BLOOD PRESSURE: 117 MMHG | WEIGHT: 200.8 LBS

## 2019-03-27 DIAGNOSIS — N18.30 CKD STAGE 3 DUE TO TYPE 2 DIABETES MELLITUS (HCC): Primary | ICD-10-CM

## 2019-03-27 DIAGNOSIS — D53.9 MACROCYTIC ANEMIA: Primary | ICD-10-CM

## 2019-03-27 DIAGNOSIS — D64.9 CHRONIC ANEMIA: Primary | ICD-10-CM

## 2019-03-27 DIAGNOSIS — E11.22 CKD STAGE 3 DUE TO TYPE 2 DIABETES MELLITUS (HCC): ICD-10-CM

## 2019-03-27 DIAGNOSIS — D47.2 MONOCLONAL GAMMOPATHY OF UNKNOWN SIGNIFICANCE (MGUS): ICD-10-CM

## 2019-03-27 DIAGNOSIS — D64.9 ANEMIA REQUIRING TRANSFUSIONS: ICD-10-CM

## 2019-03-27 DIAGNOSIS — N18.30 CKD STAGE 3 DUE TO TYPE 2 DIABETES MELLITUS (HCC): ICD-10-CM

## 2019-03-27 DIAGNOSIS — E11.22 CKD STAGE 3 DUE TO TYPE 2 DIABETES MELLITUS (HCC): Primary | ICD-10-CM

## 2019-03-27 DIAGNOSIS — D46.C MYELODYSPLASTIC SYNDROME WITH 5Q DELETION (HCC): ICD-10-CM

## 2019-03-27 LAB
BASOPHILS # BLD AUTO: 0.12 10*3/MM3 (ref 0–0.2)
BASOPHILS NFR BLD AUTO: 1.6 % (ref 0–1.5)
DEPRECATED RDW RBC AUTO: 81.4 FL (ref 37–54)
EOSINOPHIL # BLD AUTO: 0.37 10*3/MM3 (ref 0–0.4)
EOSINOPHIL NFR BLD AUTO: 5 % (ref 0.3–6.2)
ERYTHROCYTE [DISTWIDTH] IN BLOOD BY AUTOMATED COUNT: 20.6 % (ref 12.3–15.4)
HCT VFR BLD AUTO: 29.9 % (ref 34–46.6)
HGB BLD-MCNC: 9.9 G/DL (ref 12–15.9)
IMM GRANULOCYTES # BLD AUTO: 0.05 10*3/MM3 (ref 0–0.05)
IMM GRANULOCYTES NFR BLD AUTO: 0.7 % (ref 0–0.5)
LYMPHOCYTES # BLD AUTO: 2.47 10*3/MM3 (ref 0.7–3.1)
LYMPHOCYTES NFR BLD AUTO: 33.2 % (ref 19.6–45.3)
MCH RBC QN AUTO: 35.9 PG (ref 26.6–33)
MCHC RBC AUTO-ENTMCNC: 33.1 G/DL (ref 31.5–35.7)
MCV RBC AUTO: 108.3 FL (ref 79–97)
MONOCYTES # BLD AUTO: 0.36 10*3/MM3 (ref 0.1–0.9)
MONOCYTES NFR BLD AUTO: 4.8 % (ref 5–12)
NEUTROPHILS # BLD AUTO: 4.08 10*3/MM3 (ref 1.4–7)
NEUTROPHILS NFR BLD AUTO: 54.7 % (ref 42.7–76)
NRBC BLD AUTO-RTO: 0 /100 WBC (ref 0–0)
PLATELET # BLD AUTO: 444 10*3/MM3 (ref 140–450)
PMV BLD AUTO: 11.7 FL (ref 6–12)
RBC # BLD AUTO: 2.76 10*6/MM3 (ref 3.77–5.28)
WBC NRBC COR # BLD: 7.45 10*3/MM3 (ref 3.4–10.8)

## 2019-03-27 PROCEDURE — 63510000001 EPOETIN ALFA PER 1000 UNITS: Performed by: INTERNAL MEDICINE

## 2019-03-27 PROCEDURE — 96372 THER/PROPH/DIAG INJ SC/IM: CPT

## 2019-03-27 PROCEDURE — 85025 COMPLETE CBC W/AUTO DIFF WBC: CPT | Performed by: INTERNAL MEDICINE

## 2019-03-27 PROCEDURE — 99214 OFFICE O/P EST MOD 30 MIN: CPT | Performed by: INTERNAL MEDICINE

## 2019-03-27 PROCEDURE — 36415 COLL VENOUS BLD VENIPUNCTURE: CPT | Performed by: INTERNAL MEDICINE

## 2019-03-27 RX ADMIN — ERYTHROPOIETIN 30000 UNITS: 20000 INJECTION, SOLUTION INTRAVENOUS; SUBCUTANEOUS at 08:54

## 2019-03-27 NOTE — PROGRESS NOTES
Subjective     REASON FOR FOLLOW-UP:   1.  Macrocytic anemia secondary to myelodysplastic syndrome with 5 q. minus and chronic kidney disease  2.  Monoclonal gammopathy of undetermined significance                             REQUESTING PHYSICIAN:  Dr. Baugh    RECORDS OBTAINED:  Records of the patients history including those obtained from the referring provider were reviewed and summarized in detail.    History of Present Illness   This is a very pleasant 78-year-old woman seen today for evaluation of anemia.  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  She has stage III chronic kidney disease followed by Dr. Garza of nephrology.  Reviewing her records, the patient has had anemia present since at least 2014 but her hemoglobin has worsened over the past 6 months.  The patient was admitted to the hospital in October 2018 with symptomatic anemia, shortness of breath and lightheadedness.  She was found to have hemoglobin of 7.0.  There was concern for GI blood loss although EGD and colonoscopy performed showed no obvious etiology of blood loss.  The patient states that she was transfused 7 units of packed red blood cells during the hospital stay.  I do not see any Hemoccult results.  She was previously on Eliquis which was discontinued.  Since discharge in October, the patient has been receiving weekly Procrit 20,000 units in the [facility]U at [place], but despite Procrit she has required transfusion on 2 separate occasions.  She is not seeing any bright red blood per rectum or melena.  She complains of fatigue and lightheadedness.    Recent iron profile on 1/17/19 was inconsistent with iron deficiency with an iron saturation 68% and the ferritin was 352.  The red blood cells are macrocytic.  White blood cell and platelet counts have been normal.    The patient was seen in hematology on 1/29/19 and additional evaluation performed.  The  reticulocyte count was elevated 3.72% but the haptoglobin and LDH were both normal arguing against a hemolytic process.  B12 and folic acid levels were normal.  Serum protein electrophoresis showed no M spike but the immunofixation identified a small IgA monoclonal protein with lambda specificity; IgA level 544.  Sedimentation rate elevated 58.  A free light chain ratio from the serum was normal 1.64.    The patient was referred for a bone marrow exam performed on 3/6/2019 which showed a cellularity of 35%.  There was erythroid hyperplasia with megaloblastoid changes, normal myeloid maturation, increased megakaryocytes with frequent micro megakaryocytes, scattered lymphoid aggregates.  There were morphologically normal plasma cells increased in #2 8% of the cellularity.  There were no increased blasts.  No increase in iron stores.  Karyotype showed a deletion 5 q. and 19 of 20 cells analyzed.    She returned today feeling somewhat fatigue but no increase in shortness of breath or dizziness.    Past Medical History:   Diagnosis Date   • Abnormal urination    • Allergic rhinitis    • Anemia    • Anxiety    • Appetite absent    • Arthritis    • Asthma    • Back pain    • Bell's palsy    • Black tarry stools    • Blood in stool    • CKD (chronic kidney disease) stage 3, GFR 30-59 ml/min (CMS/HCC)    • Cough    • Depression    • Diabetes mellitus (CMS/[facility])     LAST A1C 6   • Diabetic gastroparesis (CMS/[facility]) 2/19/2016   • Difficulty walking    • Dizziness 2018   • Excessive urination at night    • Fatigue    • Fever, low grade    • Frequent urination    • GERD (gastroesophageal reflux disease)    • GI bleed    • H/O blood clots     LEFT LEG 7 OR 8 YEARS AGO   • Heat intolerance    • History of fall 10/2018   • History of prior pregnancies     x8, miscarriage 5   • History of transfusion 11/2018    due to anemia   • Hyperlipidemia    • Hypertension    • Hypothyroidism    • Nausea & vomiting    • Normal coronary arteries      by cath 2013   • AARON (obstructive sleep apnea)     DOESNT WEAR REGULARLY   • PONV (postoperative nausea and vomiting)    • Skin cancer    • Stroke (CMS/HCC)     Several mini-strokes   • Swelling    • TIA (transient ischemic attack)     LAST TIA JULY 2017   • Urination pain    • UTI (urinary tract infection)     Dec 2018 and Jan 2019   • Weight loss         Past Surgical History:   Procedure Laterality Date   • BACK SURGERY      HARDWARE   • CHOLECYSTECTOMY      OPEN   • COLONOSCOPY  2011    due for repeat in 2021   • COLONOSCOPY N/A 10/28/2018    Procedure: COLONOSCOPY;  Surgeon: Emmanuel Rogers MD;  Location: [facility] OR;  Service: Gastroenterology   • ENDOSCOPY N/A 10/26/2018    Procedure: ESOPHAGOGASTRODUODENOSCOPY;  Surgeon: Emmanuel Rogers MD;  Location: [facility] OR;  Service: Gastroenterology   • HYSTERECTOMY      PARTIAL    • NECK SURGERY     • SPINE SURGERY     • TUMOR REMOVAL Left     Leg   • UPPER GASTROINTESTINAL ENDOSCOPY  2014    gastritis.  done by dr. hastings        Current Outpatient Medications on File Prior to Visit   Medication Sig Dispense Refill   • ACCU-CHEK FASTCLIX LANCETS misc TEST 3-4 TIMES DAILY AS DIRECTED 400 each 3   • ACCU-CHEK SMARTVIEW test strip TEST blood sugar three times daily OR AS DIRECTED 300 each 3   • acetaminophen (TYLENOL) 325 MG tablet Take 2 tablets by mouth Every 6 (Six) Hours As Needed for Mild Pain . OTC product 40 tablet 0   • Alcohol Swabs (B-D SINGLE USE SWABS REGULAR) pads      • atorvastatin (LIPITOR) 10 MG tablet TAKE ONE TABLET BY MOUTH AT BEDTIME 90 tablet 1   • Blood Glucose Monitoring Suppl (ACCU-CHEK KENNETH SMARTVIEW) w/Device kit TEST blood sugar three times daily or as directed 1 kit 0   • cyclobenzaprine (FLEXERIL) 10 MG tablet TAKE ONE TABLET BY MOUTH TWICE DAILY as needed for muscle spasms 30 tablet 0   • diphenhydrAMINE (BENADRYL) 25 mg capsule Take 25 mg by mouth Every 6 (Six) Hours As Needed for Itching.     • DULoxetine  "(CYMBALTA) 30 MG capsule Take 1 capsule by mouth 2 (Two) Times a Day. 60 capsule 5   • furosemide (LASIX) 20 MG tablet TAKE ONE (1) TABLET ORALLY (BY MOUTH) ONCE DAILY 90 tablet 1   • gabapentin (NEURONTIN) 400 MG capsule 400 mg AM, 400 mg NOON, 800 mg  capsule 5   • HYDROcodone-acetaminophen (NORCO) 5-325 MG per tablet Take one po bid    Prn  Moderate pain 60 tablet 0   • insulin aspart (novoLOG FLEXPEN) 100 UNIT/ML solution pen-injector sc pen Inject 54 Units under the skin into the appropriate area as directed Every Morning Before Breakfast. 30 units with breakfast 35 units with lunch 35 units with dinner      • insulin aspart (novoLOG) 100 UNIT/ML injection Inject 15 Units under the skin into the appropriate area as directed Daily With Breakfast. 15 units with breakfast 25 units with lunch 25 units with dinner     • Insulin Degludec (TRESIBA FLEXTOUCH) 200 UNIT/ML solution pen-injector Inject 50 Units under the skin into the appropriate area as directed every night at bedtime.     • levothyroxine (SYNTHROID) 88 MCG tablet Take 1 tablet by mouth Daily. 90 tablet 1   • midodrine (PROAMATINE) 2.5 MG tablet TAKE ONE TABLET BY MOUTH EVERY MORNING AND ONE tablet SIX hours LATER 60 tablet 5   • Multiple Vitamins-Minerals (CENTRUM SILVER PO) Take  by mouth Daily.     • Needle, Disp, (BD DISP NEEDLES) 30G X 1/2\" misc To be used 3 times daily with Novolog Flexpen. 100 each 5   • O2 (OXYGEN) Inhale 2 L/min Every Night. Uses 2L  overnight only     • omeprazole (priLOSEC) 40 MG capsule Take one po daily 90 capsule 1   • potassium chloride (K-DUR) 10 MEQ CR tablet TAKE ONE (1) TABLET ORALLY (BY MOUTH) ONCE DAILY 90 tablet 2   • potassium chloride (MICRO-K) 10 MEQ CR capsule Take 10 mEq by mouth Every Morning.     • promethazine (PHENERGAN) 12.5 MG tablet 1/2 to 1 tablet every 6 hours as needed for nausea 20 tablet 0   • [] cephalexin (KEFLEX) 500 MG capsule Take 1 capsule by mouth 2 (Two) Times a Day for 7 days. " "14 capsule 0     No current facility-administered medications on file prior to visit.         ALLERGIES:    Allergies   Allergen Reactions   • Baclofen Anxiety     Panic attack, nightmares   • Codeine Itching and Rash   • Lisinopril Cough   • Morphine Hives   • Penicillins Rash     Tolerates cephalosporins         Social History     Socioeconomic History   • Marital status:      Spouse name: Not on file   • Number of children: 3   • Years of education: High School   • Highest education level: Not on file   Occupational History     Employer: RETIRED   Tobacco Use   • Smoking status: Never Smoker   • Smokeless tobacco: Never Used   • Tobacco comment: CAFFEINE USE: NONE   Substance and Sexual Activity   • Alcohol use: No   • Drug use: No   • Sexual activity: Defer     Comment: EXERCISE - RARELY        Family History   Problem Relation Age of Onset   • Lupus Mother    • Heart failure Mother 59   • Heart disease Other    • Hypertension Other    • Heart attack Father         Review of Systems   Constitutional: Positive for activity change and fatigue. Negative for appetite change, fever and unexpected weight change.   HENT: Negative.    Respiratory: Negative for apnea, cough and shortness of breath.    Cardiovascular: Negative.    Gastrointestinal: Positive for nausea and vomiting. Negative for abdominal pain and blood in stool.   Genitourinary: Negative for frequency and urgency.   Musculoskeletal: Positive for arthralgias, back pain and gait problem. Negative for neck stiffness.   Skin: Negative.    Neurological: Positive for numbness. Negative for dizziness, tremors and speech difficulty.   Hematological: Negative.    Psychiatric/Behavioral: Negative.        Objective     Vitals:    03/27/19 0827   BP: 117/61   Pulse: 84   Resp: 14   Temp: 97.7 °F (36.5 °C)   TempSrc: Oral   SpO2: 93%   Weight: 91.1 kg (200 lb 12.8 oz)   Height: 157.5 cm (62.01\")   PainSc:   8   PainLoc: Shoulder     Current Status 3/27/2019 "   ECOG score 0       Physical Exam    CON: pleasant well-developed somewhat chronic ill appearing woman  HEENT: no icterus, no thrush, moist membranes  NECK: no jvd  LYMPH: no cervical or supraclavicular lad  CV: RRR, S1S2, no murmur  RESP: cta bilat, no wheezing, no rales  GI: soft, non-tender, no splenomegaly, +bs, obese  MUSC: no edema, ambulates with cane  NEURO: alert and oriented x3, normal strength  PSYCH: normal mood  Exam unchanged- 3/27/2019    RECENT LABS:  Hematology WBC   Date Value Ref Range Status   03/27/2019 7.45 3.40 - 10.80 10*3/mm3 Final   03/18/2019 10.70 3.40 - 10.80 10*3/mm3 Final     Comment:     WBC, CORRECTED               10.70            10*3/mm3   3.40-10.80 N     RBC   Date Value Ref Range Status   03/27/2019 2.76 (L) 3.77 - 5.28 10*6/mm3 Final   03/18/2019 2.57 (L) 3.77 - 5.28 10*6/mm3 Final     Hemoglobin   Date Value Ref Range Status   03/27/2019 9.9 (L) 12.0 - 15.9 g/dL Final     Hematocrit   Date Value Ref Range Status   03/27/2019 29.9 (L) 34.0 - 46.6 % Final     Platelets   Date Value Ref Range Status   03/27/2019 444 140 - 450 10*3/mm3 Final        CT abdomen/pelvis 10/26/18 reviewed and showed no evidence of cirrhosis or splenomegaly.    Bone marrow report reviewed from 3/6/2019:The prevalent findings in this bone marrow biopsy are an erythroid hyperplasia with megaloblastic change and increased megakaryocytes with micromegakaryocytes and an increase in plasma cells.  No significant dysplastic change is identified.  No evidence of excess blasts is present.  The overall pattern is that of a macrocytic anemia with increased megakaryocytes and a significant plasmacytosis.  A low grade myelodysplastic syndrome cannot be excluded.  Flow cytometric and cytogenetic studies to follow    Assessment/Plan     1.  Macrocytic anemia: Bone marrow biopsy reviewed today confirms a mwelodysplastic syndrome with deletion of 5 q.  In addition, the patient has chronic kidney disease, stage III.   She is undergoing weekly therapy with Procrit.  Since we have been increasing her Procrit dose, the patient has not required recent transfusion.  5 q. minus syndrome can be treated with Revlimid but I am a little concerned about this patient's possible tolerance to the drug given her chronic kidney disease and multiple medical comorbidities.  Since she is responding to higher doses of Procrit, we will continue weekly Procrit for now.  If she requires transfusion support frequently despite Procrit, I will consider adding a low dose of Revlimid which can improve bone marrow function in the setting of 5 q. minus syndrome.  All of this was discussed with the patient today.    2.  Monoclonal gammopathy of undetermined significance: The patient's bone marrow shows slight increase in plasma cells 8% of the cellularity but normal in morphology.  She has an IgA monoclonality on her immunofixation but no measurable M spike and a normal free light chain ratio.  I do not think the paraproteinemia is likely contributing to her kidney dysfunction or anemia.  I will recheck her protein electropheresis, immunofixation and a light chain ratio in 2 months    3.  Chronic kidney disease, stage III which contributes to anemia

## 2019-03-29 ENCOUNTER — OFFICE VISIT (OUTPATIENT)
Dept: CARDIOLOGY | Facility: CLINIC | Age: 79
End: 2019-03-29

## 2019-03-29 VITALS
BODY MASS INDEX: 37.12 KG/M2 | WEIGHT: 201.7 LBS | HEIGHT: 62 IN | SYSTOLIC BLOOD PRESSURE: 130 MMHG | HEART RATE: 83 BPM | DIASTOLIC BLOOD PRESSURE: 50 MMHG

## 2019-03-29 DIAGNOSIS — G47.33 OBSTRUCTIVE SLEEP APNEA SYNDROME: ICD-10-CM

## 2019-03-29 DIAGNOSIS — E11.22 CKD STAGE 3 DUE TO TYPE 2 DIABETES MELLITUS (HCC): ICD-10-CM

## 2019-03-29 DIAGNOSIS — E11.40 TYPE 2 DIABETES MELLITUS WITH DIABETIC NEUROPATHY, WITH LONG-TERM CURRENT USE OF INSULIN (HCC): ICD-10-CM

## 2019-03-29 DIAGNOSIS — I10 ESSENTIAL HYPERTENSION: Primary | ICD-10-CM

## 2019-03-29 DIAGNOSIS — N18.30 CKD STAGE 3 DUE TO TYPE 2 DIABETES MELLITUS (HCC): ICD-10-CM

## 2019-03-29 DIAGNOSIS — Z79.4 TYPE 2 DIABETES MELLITUS WITH DIABETIC NEUROPATHY, WITH LONG-TERM CURRENT USE OF INSULIN (HCC): ICD-10-CM

## 2019-03-29 DIAGNOSIS — R42 DIZZINESS: ICD-10-CM

## 2019-03-29 DIAGNOSIS — E78.2 MIXED HYPERLIPIDEMIA: ICD-10-CM

## 2019-03-29 DIAGNOSIS — I95.1 ORTHOSTATIC HYPOTENSION: ICD-10-CM

## 2019-03-29 PROCEDURE — 93000 ELECTROCARDIOGRAM COMPLETE: CPT | Performed by: NURSE PRACTITIONER

## 2019-03-29 PROCEDURE — 99214 OFFICE O/P EST MOD 30 MIN: CPT | Performed by: NURSE PRACTITIONER

## 2019-03-29 RX ORDER — MIDODRINE HYDROCHLORIDE 2.5 MG/1
2.5 TABLET ORAL 3 TIMES DAILY
Qty: 90 TABLET | Refills: 3 | Status: SHIPPED | OUTPATIENT
Start: 2019-03-29 | End: 2019-07-17 | Stop reason: SDUPTHER

## 2019-03-29 NOTE — PROGRESS NOTES
Subjective:     Encounter Date:03/29/2019      Patient ID: Joya Singh is a 78 y.o. female.    Chief Complaint: hypotension  History of Present Illness  Ms. Singh is a new patient to me and I have reviewed her past medical records.  She is a patient of Dr. Sanon.  She has not been seen in the office since 11/7/17.     Joya Singh is a 77 y.o. female who presents today for preoperative assessment prior to knee arthroscopy.  There is a reported history of CAD, but a cardiac cath was performed at [facility] in 2013, and showed no significant intraluminal coronary atherosclerosis.  There was extraluminal coronary artery calcification.  She has diabetes, a history of stroke, and hypertension. She states she has recurrent TIAs, but it would seem that she has Bell's palsy.     She was recently hospitalized for an acute exacerbation of COPD.  She seemingly did not have acute cardiac issues during that stay; she did have some orthostasis, but this is not a new diagnosis for her at all.    She had chest pain (one episode) two months ago which lasted a few minutes and felt like a pain in the upper chest.  It did not radiate, and was not associated with dyspnea, palpitations, nausea, diaphoresis, or syncope.  It has not returned.  As mentioned above, she has chronic orthostasis but this is stable; she hasn't passed out.       She had an echo and stress performed recently. The echo showed and LVEF of 62% and was otherwise within normal limits for age.  The stress test showed a very small, extremely mild reversible defect in the distal anterior wall.       She has poor exercise tolerance due to her arthritis and obesity.     Echo 10/4/18:  · Left ventricular systolic function is normal.  · Calculated right ventricular systolic pressure from tricuspid regurgitation is 16 mmHg.  · Left ventricular diastolic dysfunction (grade I) consistent with impaired relaxation.  · Calculated EF = 67%.    She presents today,  3/29/19, for evaluation of her orthostasis.  She saw Dr. Mercado last Friday who asked her to follow-up with us.  She has recently been diagnosed with myelodysplastic syndrome and is following with Dr. Headley from hematology.  She receives a shot every week.  She is very symptomatic with positional changes from lying, sitting and standing.  Orthostatics as follows: Lying 128/58; sitting 110/48; lying standing 100/42.  While there is not a significant drop in her pressures, she reported she was quite dizzy with each positional change.  She needed to hold onto the wall while I checked her blood pressure while standing.    She denies any palpitations, edema, chest pain or chest tightness.  She frequently feels lightheaded and dizzy.  She does have shortness of air.  She is easily fatigued.  She states she falls frequently.      The following portions of the patient's history were reviewed and updated as appropriate: allergies, current medications, past family history, past medical history, past social history, past surgical history and problem list.    Review of Systems   Constitution: Negative for weakness and malaise  Fatigue  HENT: Negative for congestion, hoarse voice and sore throat.    Eyes: Negative for blurred vision, double vision, photophobia, vision loss in left eye and vision loss in right eye.   Cardiovascular: Negative for chest pain, dyspnea on exertion, irregular heartbeat, leg swelling, near-syncope, orthopnea, palpitations, paroxysmal nocturnal dyspnea and syncope.   Respiratory: Negative for cough, hemoptysis,  sleep disturbances due to breathing, snoring, sputum production and wheezing.  shortness of breath  Endocrine: Negative.    Hematologic/Lymphatic: Does not bruise/bleed easily.   Skin: Negative for color change, dry skin, poor wound healing and rash.   Musculoskeletal: Negative for back pain, gout, joint pain, joint swelling, muscle cramps and muscle weakness.  falls  Gastrointestinal: Negative  for abdominal pain, constipation, diarrhea, dysphagia, melena, nausea and vomiting.   Neurological: Negative for excessive daytime sleepiness, headaches,loss of balance, numbness, paresthesias, seizures and vertigo.  light-headedness,  dizziness  Psychiatric/Behavioral: Negative for depression and substance abuse. The patient is not nervous/anxious.        ECG 12 Lead  Date/Time: 3/29/2019 5:09 PM  Performed by: Cristin Christie APRN  Authorized by: Cristin Christie APRN   Comparison: compared with previous ECG from 11/19/2018  Similar to previous ECG  Rhythm: sinus rhythm  Rate: normal  Conduction: right bundle branch block and left anterior fascicular block  QRS axis: left    Clinical impression: abnormal EKG               Objective:         Physical Exam   Constitutional: He is oriented to person, place, and time. Vital signs are normal. He appears well-developed and well-nourished. No distress.   HENT:   Head: Normocephalic and atraumatic.   Right Ear: Hearing normal.   Left Ear: Hearing normal.   Eyes: Conjunctivae and lids are normal.   Neck: Normal range of motion. Neck supple. No JVD present. Carotid bruit is not present. No thyromegaly present.   Cardiovascular: Normal rate, regular rhythm, S1 normal, S2 normal, normal heart sounds and intact distal pulses.  PMI is not displaced.  Exam reveals no gallop.    No murmur heard.  Pulses:       Carotid pulses are 2+ on the right side, and 2+ on the left side.       Radial pulses are 2+ on the right side, and 2+ on the left side.        Dorsalis pedis pulses are 2+ on the right side, and 2+ on the left side.        Posterior tibial pulses are 2+ on the right side, and 2+ on the left side.   Pulmonary/Chest: Effort normal and breath sounds normal. No respiratory distress. He has no wheezes. He has no rhonchi. He has no rales. He exhibits no tenderness.   Abdominal: Soft. Normal appearance and bowel sounds are normal. He exhibits no distension, no abdominal bruit and no  "mass. There is no tenderness.   Musculoskeletal: Normal range of motion.   Exhibits no  deformity trace pedal edema  Lymphadenopathy:     He has no cervical adenopathy.   Neurological: He is alert and oriented to person, place, and time. No cranial nerve deficit. Coordination and gait normal.   Oriented to person, place and time.   Skin: Skin is warm, dry and intact. No rash noted. He is not diaphoretic. No cyanosis. Nails show no clubbing.   Psychiatric: He has a normal mood and affect. His speech is normal and behavior is normal. Judgment and thought content normal. Cognition and memory are normal.     Vitals:    03/29/19 0946   BP: 130/50   BP Location: Right arm   Patient Position: Sitting   Weight: 91.5 kg (201 lb 11.2 oz)   Height: 157.5 cm (62.01\")           Lab Review:       Assessment:          Diagnosis Plan   1. Essential hypertension     2. Orthostatic hypotension     3. Mixed hyperlipidemia     4. Obstructive sleep apnea syndrome     5. CKD stage 3 due to type 2 diabetes mellitus (CMS/[facility])     6. Type 2 diabetes mellitus with diabetic neuropathy, with long-term current use of insulin (CMS/[facility])            Plan:       1.  Essential HTN   2.  Orthostatic Hypotension - lying 128/58; sitting 110/48; standing 110/42.  Very symptomatic with positional changes.  Will increase midodrine to 2.5 mg TID  3.  Hyperlipidemia   4.  AARON  5.  CKD stage 3 - sees Dr. Baugh  6.  DM  7.  Myelodysplastic syndrome - sees Dr. Kayode MILLAN 6 weeks with REY Umanzor    4/1/19 Spoke to [facility] to ensure her medication was added to her prefilled med box.  It has been filled for the next 2 weeks, therefore she will get a bottle with instructions to take an additional pill at bedtime.  It will then be included in her med box starting 4/25/19.  I spoke to Tristian Campo.         "

## 2019-04-04 ENCOUNTER — LAB (OUTPATIENT)
Dept: LAB | Facility: HOSPITAL | Age: 79
End: 2019-04-04

## 2019-04-04 ENCOUNTER — INFUSION (OUTPATIENT)
Dept: ONCOLOGY | Facility: HOSPITAL | Age: 79
End: 2019-04-04

## 2019-04-04 VITALS
WEIGHT: 202.9 LBS | HEART RATE: 76 BPM | DIASTOLIC BLOOD PRESSURE: 63 MMHG | BODY MASS INDEX: 37.1 KG/M2 | OXYGEN SATURATION: 94 % | SYSTOLIC BLOOD PRESSURE: 116 MMHG | TEMPERATURE: 97.8 F

## 2019-04-04 DIAGNOSIS — D64.9 ANEMIA REQUIRING TRANSFUSIONS: Primary | ICD-10-CM

## 2019-04-04 DIAGNOSIS — D46.C MYELODYSPLASTIC SYNDROME WITH 5Q DELETION (HCC): ICD-10-CM

## 2019-04-04 DIAGNOSIS — E11.22 CKD STAGE 3 DUE TO TYPE 2 DIABETES MELLITUS (HCC): ICD-10-CM

## 2019-04-04 DIAGNOSIS — N18.30 CKD STAGE 3 DUE TO TYPE 2 DIABETES MELLITUS (HCC): ICD-10-CM

## 2019-04-04 DIAGNOSIS — D47.2 MONOCLONAL GAMMOPATHY OF UNKNOWN SIGNIFICANCE (MGUS): ICD-10-CM

## 2019-04-04 DIAGNOSIS — D64.9 CHRONIC ANEMIA: ICD-10-CM

## 2019-04-04 LAB
BASOPHILS # BLD AUTO: 0.08 10*3/MM3 (ref 0–0.2)
BASOPHILS NFR BLD AUTO: 1.4 % (ref 0–1.5)
DEPRECATED RDW RBC AUTO: 86.4 FL (ref 37–54)
EOSINOPHIL # BLD AUTO: 0.27 10*3/MM3 (ref 0–0.4)
EOSINOPHIL NFR BLD AUTO: 4.6 % (ref 0.3–6.2)
ERYTHROCYTE [DISTWIDTH] IN BLOOD BY AUTOMATED COUNT: 21 % (ref 12.3–15.4)
HCT VFR BLD AUTO: 27.1 % (ref 34–46.6)
HGB BLD-MCNC: 8.9 G/DL (ref 12–15.9)
IMM GRANULOCYTES # BLD AUTO: 0.06 10*3/MM3 (ref 0–0.05)
IMM GRANULOCYTES NFR BLD AUTO: 1 % (ref 0–0.5)
LYMPHOCYTES # BLD AUTO: 1.65 10*3/MM3 (ref 0.7–3.1)
LYMPHOCYTES NFR BLD AUTO: 28.3 % (ref 19.6–45.3)
MCH RBC QN AUTO: 37.1 PG (ref 26.6–33)
MCHC RBC AUTO-ENTMCNC: 32.8 G/DL (ref 31.5–35.7)
MCV RBC AUTO: 112.9 FL (ref 79–97)
MONOCYTES # BLD AUTO: 0.27 10*3/MM3 (ref 0.1–0.9)
MONOCYTES NFR BLD AUTO: 4.6 % (ref 5–12)
NEUTROPHILS # BLD AUTO: 3.5 10*3/MM3 (ref 1.4–7)
NEUTROPHILS NFR BLD AUTO: 60.1 % (ref 42.7–76)
NRBC BLD AUTO-RTO: 0 /100 WBC (ref 0–0)
PLATELET # BLD AUTO: 337 10*3/MM3 (ref 140–450)
PMV BLD AUTO: 11.8 FL (ref 6–12)
RBC # BLD AUTO: 2.4 10*6/MM3 (ref 3.77–5.28)
WBC NRBC COR # BLD: 5.83 10*3/MM3 (ref 3.4–10.8)

## 2019-04-04 PROCEDURE — 36415 COLL VENOUS BLD VENIPUNCTURE: CPT | Performed by: INTERNAL MEDICINE

## 2019-04-04 PROCEDURE — 85025 COMPLETE CBC W/AUTO DIFF WBC: CPT | Performed by: INTERNAL MEDICINE

## 2019-04-04 PROCEDURE — 96372 THER/PROPH/DIAG INJ SC/IM: CPT

## 2019-04-04 PROCEDURE — 63510000001 EPOETIN ALFA PER 1000 UNITS: Performed by: INTERNAL MEDICINE

## 2019-04-04 RX ADMIN — ERYTHROPOIETIN 30000 UNITS: 20000 INJECTION, SOLUTION INTRAVENOUS; SUBCUTANEOUS at 10:59

## 2019-04-11 ENCOUNTER — INFUSION (OUTPATIENT)
Dept: ONCOLOGY | Facility: HOSPITAL | Age: 79
End: 2019-04-11

## 2019-04-11 ENCOUNTER — LAB (OUTPATIENT)
Dept: LAB | Facility: HOSPITAL | Age: 79
End: 2019-04-11

## 2019-04-11 VITALS
SYSTOLIC BLOOD PRESSURE: 134 MMHG | DIASTOLIC BLOOD PRESSURE: 75 MMHG | TEMPERATURE: 98 F | OXYGEN SATURATION: 92 % | HEART RATE: 91 BPM

## 2019-04-11 DIAGNOSIS — E11.22 CKD STAGE 3 DUE TO TYPE 2 DIABETES MELLITUS (HCC): Primary | ICD-10-CM

## 2019-04-11 DIAGNOSIS — D64.9 ANEMIA REQUIRING TRANSFUSIONS: ICD-10-CM

## 2019-04-11 DIAGNOSIS — D64.9 CHRONIC ANEMIA: ICD-10-CM

## 2019-04-11 DIAGNOSIS — N18.30 CKD STAGE 3 DUE TO TYPE 2 DIABETES MELLITUS (HCC): Primary | ICD-10-CM

## 2019-04-11 DIAGNOSIS — D46.C MYELODYSPLASTIC SYNDROME WITH 5Q DELETION (HCC): ICD-10-CM

## 2019-04-11 DIAGNOSIS — D47.2 MONOCLONAL GAMMOPATHY OF UNKNOWN SIGNIFICANCE (MGUS): ICD-10-CM

## 2019-04-11 LAB
BASOPHILS # BLD AUTO: 0.09 10*3/MM3 (ref 0–0.2)
BASOPHILS NFR BLD AUTO: 1.8 % (ref 0–1.5)
DEPRECATED RDW RBC AUTO: 76.9 FL (ref 37–54)
EOSINOPHIL # BLD AUTO: 0.29 10*3/MM3 (ref 0–0.4)
EOSINOPHIL NFR BLD AUTO: 5.8 % (ref 0.3–6.2)
ERYTHROCYTE [DISTWIDTH] IN BLOOD BY AUTOMATED COUNT: 19.7 % (ref 12.3–15.4)
HCT VFR BLD AUTO: 26 % (ref 34–46.6)
HGB BLD-MCNC: 8.8 G/DL (ref 12–15.9)
IMM GRANULOCYTES # BLD AUTO: 0.08 10*3/MM3 (ref 0–0.05)
IMM GRANULOCYTES NFR BLD AUTO: 1.6 % (ref 0–0.5)
LYMPHOCYTES # BLD AUTO: 1.98 10*3/MM3 (ref 0.7–3.1)
LYMPHOCYTES NFR BLD AUTO: 39.4 % (ref 19.6–45.3)
MCH RBC QN AUTO: 36.7 PG (ref 26.6–33)
MCHC RBC AUTO-ENTMCNC: 33.8 G/DL (ref 31.5–35.7)
MCV RBC AUTO: 108.3 FL (ref 79–97)
MONOCYTES # BLD AUTO: 0.3 10*3/MM3 (ref 0.1–0.9)
MONOCYTES NFR BLD AUTO: 6 % (ref 5–12)
NEUTROPHILS # BLD AUTO: 2.29 10*3/MM3 (ref 1.4–7)
NEUTROPHILS NFR BLD AUTO: 45.4 % (ref 42.7–76)
NRBC BLD AUTO-RTO: 0.4 /100 WBC (ref 0–0)
PLATELET # BLD AUTO: 365 10*3/MM3 (ref 140–450)
PMV BLD AUTO: 11.4 FL (ref 6–12)
RBC # BLD AUTO: 2.4 10*6/MM3 (ref 3.77–5.28)
WBC NRBC COR # BLD: 5.03 10*3/MM3 (ref 3.4–10.8)

## 2019-04-11 PROCEDURE — 25010000002 EPOETIN ALFA PER 1000 UNITS: Performed by: INTERNAL MEDICINE

## 2019-04-11 PROCEDURE — 85025 COMPLETE CBC W/AUTO DIFF WBC: CPT | Performed by: INTERNAL MEDICINE

## 2019-04-11 PROCEDURE — 96372 THER/PROPH/DIAG INJ SC/IM: CPT

## 2019-04-11 PROCEDURE — 36415 COLL VENOUS BLD VENIPUNCTURE: CPT

## 2019-04-11 RX ADMIN — ERYTHROPOIETIN 40000 UNITS: 40000 INJECTION, SOLUTION INTRAVENOUS; SUBCUTANEOUS at 10:52

## 2019-04-12 RX ORDER — LEVOTHYROXINE SODIUM 88 UG/1
TABLET ORAL
Qty: 90 TABLET | Refills: 1 | Status: SHIPPED | OUTPATIENT
Start: 2019-04-12 | End: 2019-09-20 | Stop reason: HOSPADM

## 2019-04-12 RX ORDER — OMEPRAZOLE 40 MG/1
CAPSULE, DELAYED RELEASE ORAL
Qty: 90 CAPSULE | Refills: 1 | Status: SHIPPED | OUTPATIENT
Start: 2019-04-12 | End: 2019-12-17 | Stop reason: SDUPTHER

## 2019-04-12 RX ORDER — GABAPENTIN 400 MG/1
CAPSULE ORAL
Qty: 120 CAPSULE | Refills: 5 | Status: SHIPPED | OUTPATIENT
Start: 2019-04-12 | End: 2019-09-20 | Stop reason: HOSPADM

## 2019-04-17 ENCOUNTER — TELEPHONE (OUTPATIENT)
Dept: INTERNAL MEDICINE | Facility: CLINIC | Age: 79
End: 2019-04-17

## 2019-04-17 RX ORDER — NITROFURANTOIN 25; 75 MG/1; MG/1
CAPSULE ORAL
Qty: 14 CAPSULE | Refills: 0 | Status: SHIPPED | OUTPATIENT
Start: 2019-04-17 | End: 2019-06-18

## 2019-04-17 NOTE — TELEPHONE ENCOUNTER
SENT  TO PHARMACY  PT  [facility]      ----- Message from Chari Mercado MD sent at 4/17/2019  2:04 PM EDT -----  Regarding: RE: MED REQUEST  Contact: [phone]  macrobid 100mg po bid x 7 days.    ----- Message -----  From: Gaviota Bowles MA  Sent: 4/16/2019   1:36 PM  To: Chari Mercado MD  Subject: FW: MED REQUEST                                      ----- Message -----  From: Deb Corea  Sent: 4/16/2019   1:26 PM  To: [facility] Clinical [facility]  Subject: MED REQUEST                                      [facility]    Patient called to say that she has another UTI. She is wetting all over herself. She is asking for antibiotics      Thanks!  deb

## 2019-04-18 ENCOUNTER — LAB (OUTPATIENT)
Dept: LAB | Facility: HOSPITAL | Age: 79
End: 2019-04-18

## 2019-04-18 ENCOUNTER — INFUSION (OUTPATIENT)
Dept: ONCOLOGY | Facility: HOSPITAL | Age: 79
End: 2019-04-18

## 2019-04-18 VITALS
DIASTOLIC BLOOD PRESSURE: 80 MMHG | TEMPERATURE: 98.2 F | SYSTOLIC BLOOD PRESSURE: 128 MMHG | OXYGEN SATURATION: 95 % | HEART RATE: 81 BPM

## 2019-04-18 DIAGNOSIS — D46.C MYELODYSPLASTIC SYNDROME WITH 5Q DELETION (HCC): ICD-10-CM

## 2019-04-18 DIAGNOSIS — D47.2 MONOCLONAL GAMMOPATHY OF UNKNOWN SIGNIFICANCE (MGUS): ICD-10-CM

## 2019-04-18 DIAGNOSIS — D64.9 ANEMIA REQUIRING TRANSFUSIONS: ICD-10-CM

## 2019-04-18 DIAGNOSIS — N18.30 CKD STAGE 3 DUE TO TYPE 2 DIABETES MELLITUS (HCC): ICD-10-CM

## 2019-04-18 DIAGNOSIS — D64.9 CHRONIC ANEMIA: Primary | ICD-10-CM

## 2019-04-18 DIAGNOSIS — E11.22 CKD STAGE 3 DUE TO TYPE 2 DIABETES MELLITUS (HCC): ICD-10-CM

## 2019-04-18 DIAGNOSIS — D64.9 CHRONIC ANEMIA: ICD-10-CM

## 2019-04-18 LAB
ABO GROUP BLD: NORMAL
BASOPHILS # BLD AUTO: 0.11 10*3/MM3 (ref 0–0.2)
BASOPHILS NFR BLD AUTO: 2.1 % (ref 0–1.5)
BLD GP AB SCN SERPL QL: NEGATIVE
DEPRECATED RDW RBC AUTO: 79.7 FL (ref 37–54)
EOSINOPHIL # BLD AUTO: 0.26 10*3/MM3 (ref 0–0.4)
EOSINOPHIL NFR BLD AUTO: 5 % (ref 0.3–6.2)
ERYTHROCYTE [DISTWIDTH] IN BLOOD BY AUTOMATED COUNT: 19.4 % (ref 12.3–15.4)
FERRITIN SERPL-MCNC: 426 NG/ML (ref 13–150)
HCT VFR BLD AUTO: 24.3 % (ref 34–46.6)
HGB BLD-MCNC: 8 G/DL (ref 12–15.9)
IMM GRANULOCYTES # BLD AUTO: 0.02 10*3/MM3 (ref 0–0.05)
IMM GRANULOCYTES NFR BLD AUTO: 0.4 % (ref 0–0.5)
IRON 24H UR-MRATE: 85 MCG/DL (ref 37–145)
IRON SATN MFR SERPL: 47 % (ref 20–50)
LYMPHOCYTES # BLD AUTO: 1.93 10*3/MM3 (ref 0.7–3.1)
LYMPHOCYTES NFR BLD AUTO: 37 % (ref 19.6–45.3)
MCH RBC QN AUTO: 37.2 PG (ref 26.6–33)
MCHC RBC AUTO-ENTMCNC: 32.9 G/DL (ref 31.5–35.7)
MCV RBC AUTO: 113 FL (ref 79–97)
MONOCYTES # BLD AUTO: 0.27 10*3/MM3 (ref 0.1–0.9)
MONOCYTES NFR BLD AUTO: 5.2 % (ref 5–12)
NEUTROPHILS # BLD AUTO: 2.62 10*3/MM3 (ref 1.4–7)
NEUTROPHILS NFR BLD AUTO: 50.3 % (ref 42.7–76)
PLATELET # BLD AUTO: 364 10*3/MM3 (ref 140–450)
PMV BLD AUTO: 11.6 FL (ref 6–12)
RBC # BLD AUTO: 2.15 10*6/MM3 (ref 3.77–5.28)
RH BLD: POSITIVE
T&S EXPIRATION DATE: NORMAL
TIBC SERPL-MCNC: 179 MCG/DL (ref 298–536)
UIBC SERPL-MCNC: 94 MCG/DL (ref 112–346)
WBC NRBC COR # BLD: 5.21 10*3/MM3 (ref 3.4–10.8)

## 2019-04-18 PROCEDURE — 96372 THER/PROPH/DIAG INJ SC/IM: CPT | Performed by: NURSE PRACTITIONER

## 2019-04-18 PROCEDURE — 86850 RBC ANTIBODY SCREEN: CPT | Performed by: NURSE PRACTITIONER

## 2019-04-18 PROCEDURE — 25010000002 EPOETIN ALFA PER 1000 UNITS: Performed by: NURSE PRACTITIONER

## 2019-04-18 PROCEDURE — 86923 COMPATIBILITY TEST ELECTRIC: CPT

## 2019-04-18 PROCEDURE — 86901 BLOOD TYPING SEROLOGIC RH(D): CPT | Performed by: NURSE PRACTITIONER

## 2019-04-18 PROCEDURE — 83550 IRON BINDING TEST: CPT | Performed by: INTERNAL MEDICINE

## 2019-04-18 PROCEDURE — 82728 ASSAY OF FERRITIN: CPT | Performed by: INTERNAL MEDICINE

## 2019-04-18 PROCEDURE — 86900 BLOOD TYPING SEROLOGIC ABO: CPT | Performed by: NURSE PRACTITIONER

## 2019-04-18 PROCEDURE — 85025 COMPLETE CBC W/AUTO DIFF WBC: CPT | Performed by: INTERNAL MEDICINE

## 2019-04-18 PROCEDURE — 83540 ASSAY OF IRON: CPT | Performed by: INTERNAL MEDICINE

## 2019-04-18 PROCEDURE — 36415 COLL VENOUS BLD VENIPUNCTURE: CPT | Performed by: NURSE PRACTITIONER

## 2019-04-18 RX ORDER — ACETAMINOPHEN 325 MG/1
650 TABLET ORAL ONCE
Status: CANCELLED | OUTPATIENT
Start: 2019-04-18 | End: 2019-04-18

## 2019-04-18 RX ORDER — SODIUM CHLORIDE 9 MG/ML
250 INJECTION, SOLUTION INTRAVENOUS AS NEEDED
Status: CANCELLED | OUTPATIENT
Start: 2019-04-18

## 2019-04-18 RX ORDER — DIPHENHYDRAMINE HCL 25 MG
25 CAPSULE ORAL ONCE
Status: CANCELLED | OUTPATIENT
Start: 2019-04-18 | End: 2019-04-18

## 2019-04-18 RX ADMIN — ERYTHROPOIETIN 40000 UNITS: 40000 INJECTION, SOLUTION INTRAVENOUS; SUBCUTANEOUS at 10:56

## 2019-04-18 NOTE — PROGRESS NOTES
Pt's HGB 8.0 today.  Pt c/o feeling extremely fatigued, SOA with minimal exertion and dizzy.  Pt to receive 2 units PRBC'S tomorrow (4/19) in [place] [facility] at 8:30. T&S drawn and blood band placed on patient.  Pt instructed not to remove band.  Pt V/U.

## 2019-04-19 ENCOUNTER — HOSPITAL ENCOUNTER (OUTPATIENT)
Dept: INFUSION THERAPY | Facility: HOSPITAL | Age: 79
Discharge: HOME | End: 2019-04-19
Admitting: NURSE PRACTITIONER

## 2019-04-19 VITALS
TEMPERATURE: 99 F | SYSTOLIC BLOOD PRESSURE: 138 MMHG | OXYGEN SATURATION: 92 % | WEIGHT: 202.82 LBS | DIASTOLIC BLOOD PRESSURE: 73 MMHG | HEIGHT: 62 IN | RESPIRATION RATE: 16 BRPM | HEART RATE: 84 BPM | BODY MASS INDEX: 37.32 KG/M2

## 2019-04-19 DIAGNOSIS — D64.9 CHRONIC ANEMIA: ICD-10-CM

## 2019-04-19 PROCEDURE — P9016 RBC LEUKOCYTES REDUCED: HCPCS

## 2019-04-19 PROCEDURE — 63710000001 DIPHENHYDRAMINE PER 50 MG: Performed by: NURSE PRACTITIONER

## 2019-04-19 PROCEDURE — A9270 NON-COVERED ITEM OR SERVICE: HCPCS | Performed by: NURSE PRACTITIONER

## 2019-04-19 PROCEDURE — 36430 TRANSFUSION BLD/BLD COMPNT: CPT

## 2019-04-19 PROCEDURE — 86900 BLOOD TYPING SEROLOGIC ABO: CPT

## 2019-04-19 PROCEDURE — 63710000001 ACETAMINOPHEN 325 MG TABLET: Performed by: NURSE PRACTITIONER

## 2019-04-19 RX ORDER — ACETAMINOPHEN 325 MG/1
650 TABLET ORAL ONCE
Status: COMPLETED | OUTPATIENT
Start: 2019-04-19 | End: 2019-04-19

## 2019-04-19 RX ORDER — SODIUM CHLORIDE 9 MG/ML
INJECTION, SOLUTION INTRAVENOUS
Status: COMPLETED
Start: 2019-04-19 | End: 2019-04-19

## 2019-04-19 RX ORDER — SODIUM CHLORIDE 9 MG/ML
250 INJECTION, SOLUTION INTRAVENOUS AS NEEDED
Status: DISCONTINUED | OUTPATIENT
Start: 2019-04-19 | End: 2019-04-21 | Stop reason: HOSPADM

## 2019-04-19 RX ORDER — DIPHENHYDRAMINE HCL 25 MG
25 CAPSULE ORAL ONCE
Status: COMPLETED | OUTPATIENT
Start: 2019-04-19 | End: 2019-04-19

## 2019-04-19 RX ADMIN — SODIUM CHLORIDE 250 ML: 9 INJECTION, SOLUTION INTRAVENOUS at 11:43

## 2019-04-19 RX ADMIN — DIPHENHYDRAMINE HYDROCHLORIDE 25 MG: 25 CAPSULE ORAL at 08:49

## 2019-04-19 RX ADMIN — SODIUM CHLORIDE 250 ML: 9 INJECTION, SOLUTION INTRAVENOUS at 11:48

## 2019-04-19 RX ADMIN — ACETAMINOPHEN 650 MG: 325 TABLET, FILM COATED ORAL at 08:49

## 2019-04-19 NOTE — PATIENT INSTRUCTIONS
"  Call [facility] at [phone] if you have any problems or concerns.    We know you have a Choice in healthcare and appreciate you using [facility].  Our purpose is to provide you \"Excellent Care\".  We hope that you will always choose us in the future and continue to recommend us to your family and friends.              Blood Transfusion, Adult, Care After  This sheet gives you information about how to care for yourself after your procedure. Your doctor may also give you more specific instructions. If you have problems or questions, contact your doctor.  Follow these instructions at home:  · Take over-the-counter and prescription medicines only as told by your doctor.  · Go back to your normal activities as told by your doctor.  · Follow instructions from your doctor about how to take care of the area where an IV tube was put into your vein (insertion site). Make sure you:  ? Wash your hands with soap and water before you change your bandage (dressing). If there is no soap and water, use hand .  ? Change your bandage as told by your doctor.  · Check your IV insertion site every day for signs of infection. Check for:  ? More redness, swelling, or pain.  ? More fluid or blood.  ? Warmth.  ? Pus or a bad smell.  Contact a doctor if:  · You have more redness, swelling, or pain around the IV insertion site..  · You have more fluid or blood coming from the IV insertion site.  · Your IV insertion site feels warm to the touch.  · You have pus or a bad smell coming from the IV insertion site.  · Your pee (urine) turns pink, red, or brown.  · You feel weak after doing your normal activities.  Get help right away if:  · You have signs of a serious allergic or body defense (immune) system reaction, including:  ? Itchiness.  ? Hives.  ? Trouble breathing.  ? Anxiety.  ? Pain in your chest or lower back.  ? Fever, flushing, and chills.  ? Fast pulse.  ? Rash.  ? Watery poop (diarrhea).  ? Throwing up " (vomiting).  ? Dark pee.  ? Serious headache.  ? Dizziness.  ? Stiff neck.  ? Yellow color in your face or the white parts of your eyes (jaundice).  Summary  · After a blood transfusion, return to your normal activities as told by your doctor.  · Every day, check for signs of infection where the IV tube was put into your vein.  · Some signs of infection are warm skin, more redness and pain, more fluid or blood, and pus or a bad smell where the needle went in.  · Contact your doctor if you feel weak or have any unusual symptoms.  This information is not intended to replace advice given to you by your health care provider. Make sure you discuss any questions you have with your health care provider.  Document Released: 01/08/2016 Document Revised: 08/11/2017 Document Reviewed: 08/11/2017  [employer] Interactive Patient Education © 2019 [employer]

## 2019-04-19 NOTE — NURSING NOTE
NURSING PROGRESS NOTE: Patient arrived to [facility] per W/C at 0835, with her son.  Here for scheduled transfusion.  Consent signed and patient comfortable in recliner.  Lunch provided at 1230.  Procedure performed without incident.  Escorted to lobby per W/C at 1520 per son, and discharged home without c/o.  AVS reviewed prior to departure from unit and a copy provided. ANISH Kathleen

## 2019-04-20 LAB
ABO + RH BLD: NORMAL
ABO + RH BLD: NORMAL
BH BB BLOOD EXPIRATION DATE: NORMAL
BH BB BLOOD EXPIRATION DATE: NORMAL
BH BB BLOOD TYPE BARCODE: 1700
BH BB BLOOD TYPE BARCODE: 7300
BH BB DISPENSE STATUS: NORMAL
BH BB DISPENSE STATUS: NORMAL
BH BB PRODUCT CODE: NORMAL
BH BB PRODUCT CODE: NORMAL
BH BB UNIT NUMBER: NORMAL
BH BB UNIT NUMBER: NORMAL
UNIT  ABO: NORMAL
UNIT  ABO: NORMAL
UNIT  RH: NORMAL
UNIT  RH: NORMAL

## 2019-04-23 ENCOUNTER — OFFICE VISIT (OUTPATIENT)
Dept: INTERNAL MEDICINE | Facility: CLINIC | Age: 79
End: 2019-04-23

## 2019-04-23 VITALS
WEIGHT: 200 LBS | DIASTOLIC BLOOD PRESSURE: 84 MMHG | SYSTOLIC BLOOD PRESSURE: 118 MMHG | RESPIRATION RATE: 16 BRPM | BODY MASS INDEX: 36.8 KG/M2 | OXYGEN SATURATION: 90 % | HEART RATE: 97 BPM | HEIGHT: 62 IN

## 2019-04-23 DIAGNOSIS — E11.40 TYPE 2 DIABETES MELLITUS WITH DIABETIC NEUROPATHY, WITH LONG-TERM CURRENT USE OF INSULIN (HCC): Primary | ICD-10-CM

## 2019-04-23 DIAGNOSIS — D46.C MYELODYSPLASTIC SYNDROME WITH 5Q DELETION (HCC): ICD-10-CM

## 2019-04-23 DIAGNOSIS — N18.30 CKD STAGE 3 DUE TO TYPE 2 DIABETES MELLITUS (HCC): ICD-10-CM

## 2019-04-23 DIAGNOSIS — E11.22 CKD STAGE 3 DUE TO TYPE 2 DIABETES MELLITUS (HCC): ICD-10-CM

## 2019-04-23 DIAGNOSIS — E78.2 MIXED HYPERLIPIDEMIA: ICD-10-CM

## 2019-04-23 DIAGNOSIS — Z79.4 TYPE 2 DIABETES MELLITUS WITH DIABETIC NEUROPATHY, WITH LONG-TERM CURRENT USE OF INSULIN (HCC): Primary | ICD-10-CM

## 2019-04-23 DIAGNOSIS — I10 ESSENTIAL HYPERTENSION: ICD-10-CM

## 2019-04-23 DIAGNOSIS — E03.9 ACQUIRED HYPOTHYROIDISM: ICD-10-CM

## 2019-04-23 PROCEDURE — 99215 OFFICE O/P EST HI 40 MIN: CPT | Performed by: INTERNAL MEDICINE

## 2019-04-23 NOTE — PROGRESS NOTES
"      Joya Singh is a 79 y.o. female, who presents with a chief complaint of   Chief Complaint   Patient presents with   • Diabetes     f/u       HPI   Pt here for follow up.  Since her last OV her bone marrow biopsy results came back  She has myelodysplastic syndrome.  She is on Epogen and got PRBC's x 2 last Friday. She is still tired.  She had a good Easter.  She is coming to to terms with the reality of the prognosis of myelodysplastic syndrome.  She is trying to be as active as she can but can't walk much.  She needs a wheelchair to go out often.  She has been short of breath.  She sleeps up in the recliner bc of the SO.  She wears oxygen at night but not during the day.  At rest her o2sat is 90% today.    She has orthostatic hypotension. She recently saw cardiology and her midodrine was increased to 2.5mg po TID.  She says this has helped some.      Diabetes mellitus - Pt's glucoses are \"terrible\"  She says she has been craving sweets and eating them lately.  I will not check an A1c bc she had a PRBC transfusion x 2 units last week.  Overall she has had 20 units of PRBC's in the past 6 months.  Her weight has been stable.      The following portions of the patient's history were reviewed and updated as appropriate: allergies, current medications, past family history, past medical history, past social history, past surgical history and problem list.    Allergies: Baclofen; Codeine; Lisinopril; Morphine; and Penicillins    Review of Systems   Constitutional: Negative.    HENT: Negative.    Eyes: Negative.    Respiratory: Negative.    Cardiovascular: Negative.    Gastrointestinal: Negative.    Endocrine: Negative.    Genitourinary: Negative.    Musculoskeletal: Negative.    Skin: Negative.    Allergic/Immunologic: Negative.    Neurological: Negative.    Hematological: Negative.    Psychiatric/Behavioral: Negative.    All other systems reviewed and are negative.            Wt Readings from Last 3 Encounters: "   04/23/19 90.7 kg (200 lb)   04/19/19 92 kg (202 lb 13.2 oz)   04/04/19 92 kg (202 lb 14.4 oz)     Temp Readings from Last 3 Encounters:   04/19/19 99 °F (37.2 °C)   04/18/19 98.2 °F (36.8 °C)   04/11/19 98 °F (36.7 °C)     BP Readings from Last 3 Encounters:   04/23/19 118/84   04/19/19 138/73   04/18/19 128/80     Pulse Readings from Last 3 Encounters:   04/23/19 97   04/19/19 84   04/18/19 81     Body mass index is 36.58 kg/m².  @LASTSAO2(3)@    Physical Exam   Constitutional: She is oriented to person, place, and time. She appears well-developed and well-nourished. No distress.   HENT:   Head: Normocephalic and atraumatic.   Right Ear: External ear normal.   Left Ear: External ear normal.   Nose: Nose normal.   Mouth/Throat: Oropharynx is clear and moist.   Eyes: Conjunctivae and EOM are normal. Pupils are equal, round, and reactive to light.   Neck: Normal range of motion. Neck supple.   Cardiovascular: Normal rate, regular rhythm, normal heart sounds and intact distal pulses.   Pulmonary/Chest: Effort normal and breath sounds normal. No respiratory distress. She has no wheezes.   Musculoskeletal: Normal range of motion.   Normal gait   Neurological: She is alert and oriented to person, place, and time.   Skin: Skin is warm and dry.   Psychiatric: She has a normal mood and affect. Her behavior is normal. Judgment and thought content normal.   Nursing note and vitals reviewed.      Results for orders placed or performed during the hospital encounter of 04/19/19   Prepare RBC, 2 Units   Result Value Ref Range    Product Code A0330I18     Unit Number [id number]     UNIT  ABO B     UNIT  RH NEG     Dispense Status PT     Blood Type BNEG     Blood Expiration Date 201904262359     Blood Type Barcode 1700     Product Code X2720X47     Unit Number [id number]     UNIT  ABO B     UNIT  RH POS     Dispense Status PT     Blood Type BPOS     Blood Expiration Date [id number]     Blood Type Barcode 7300    Type  and screen   Result Value Ref Range    ABO Type B     RH type Positive     Antibody Screen Negative     T&S Expiration Date 4/21/2019 11:59:59 PM            Joya was seen today for diabetes.    Diagnoses and all orders for this visit:    Type 2 diabetes mellitus with diabetic neuropathy, with long-term current use of insulin (CMS/[facility])  -     Comprehensive Metabolic Panel  -     CBC & Differential    Essential hypertension    CKD stage 3 due to type 2 diabetes mellitus (CMS/[facility])  -     Comprehensive Metabolic Panel  -     CBC & Differential    Myelodysplastic syndrome with 5q deletion (CMS/[facility])  -     Comprehensive Metabolic Panel  -     CBC & Differential    Acquired hypothyroidism    Mixed hyperlipidemia      40 min spent with patient with > 50% spent on counseling about above issues.  Cont current meds.  Check glucoses regularly.  Discussed that keeping glucoses under control will help her feel better and keep from getting sick as often.  No a1c bc of repeated packed red blood cell transfusions (20 units in the past 6 months).  Discussed long term prognosis of myelodysplastic syndrome.  Encouraged pt to focus on quality of life, taking things 1 day at a time, having as many good days as possible.        Outpatient Medications Prior to Visit   Medication Sig Dispense Refill   • ACCU-CHEK FASTCLIX LANCETS misc TEST 3-4 TIMES DAILY AS DIRECTED 400 each 3   • ACCU-CHEK SMARTVIEW test strip TEST blood sugar three times daily OR AS DIRECTED 300 each 3   • acetaminophen (TYLENOL) 325 MG tablet Take 2 tablets by mouth Every 6 (Six) Hours As Needed for Mild Pain . OTC product 40 tablet 0   • Alcohol Swabs (B-D SINGLE USE SWABS REGULAR) pads      • atorvastatin (LIPITOR) 10 MG tablet TAKE ONE TABLET BY MOUTH AT BEDTIME 90 tablet 1   • Blood Glucose Monitoring Suppl (ACCU-CHEK KENNETH SMARTVIEW) w/Device kit TEST blood sugar three times daily or as directed 1 kit 0   • cyclobenzaprine (FLEXERIL) 10 MG tablet TAKE ONE TABLET BY  "MOUTH TWICE DAILY as needed for muscle spasms 30 tablet 0   • diphenhydrAMINE (BENADRYL) 25 mg capsule Take 25 mg by mouth Every 6 (Six) Hours As Needed for Itching.     • DULoxetine (CYMBALTA) 30 MG capsule Take 1 capsule by mouth 2 (Two) Times a Day. 60 capsule 5   • furosemide (LASIX) 20 MG tablet TAKE ONE (1) TABLET ORALLY (BY MOUTH) ONCE DAILY 90 tablet 1   • gabapentin (NEURONTIN) 400 MG capsule TAKE ONE CAPSULE BY MOUTH EVERY MORNING, ONE AT NOON, AND TWO AT BEDTIME 120 capsule 5   • HYDROcodone-acetaminophen (NORCO) 5-325 MG per tablet Take one po bid    Prn  Moderate pain 60 tablet 0   • insulin aspart (novoLOG FLEXPEN) 100 UNIT/ML solution pen-injector sc pen Inject 54 Units under the skin into the appropriate area as directed Every Morning Before Breakfast. 30 units with breakfast 35 units with lunch 35 units with dinner      • insulin aspart (novoLOG) 100 UNIT/ML injection Inject 15 Units under the skin into the appropriate area as directed Daily With Breakfast. 15 units with breakfast 25 units with lunch 25 units with dinner     • Insulin Degludec (TRESIBA FLEXTOUCH) 200 UNIT/ML solution pen-injector Inject 50 Units under the skin into the appropriate area as directed every night at bedtime.     • levothyroxine (SYNTHROID, LEVOTHROID) 88 MCG tablet TAKE ONE TABLET BY MOUTH EVERY DAY 90 tablet 1   • midodrine (PROAMATINE) 2.5 MG tablet Take 1 tablet by mouth 3 (Three) Times a Day. 90 tablet 3   • Multiple Vitamins-Minerals (CENTRUM SILVER PO) Take  by mouth Daily.     • Needle, Disp, (BD DISP NEEDLES) 30G X 1/2\" misc To be used 3 times daily with Novolog Flexpen. 100 each 5   • nitrofurantoin, macrocrystal-monohydrate, (MACROBID) 100 MG capsule TAKE ONE PO BID   X  7 DAYS 14 capsule 0   • O2 (OXYGEN) Inhale 2 L/min Every Night. Uses 2L  overnight only     • omeprazole (priLOSEC) 40 MG capsule TAKE ONE CAPSULE BY MOUTH EVERY DAY 90 capsule 1   • potassium chloride (K-DUR) 10 MEQ CR tablet TAKE ONE (1) " TABLET ORALLY (BY MOUTH) ONCE DAILY 90 tablet 2   • promethazine (PHENERGAN) 12.5 MG tablet 1/2 to 1 tablet every 6 hours as needed for nausea 20 tablet 0     No facility-administered medications prior to visit.      No orders of the defined types were placed in this encounter.    [unfilled]  There are no discontinued medications.      Return in about 1 month (around 5/23/2019) for Recheck.

## 2019-04-24 LAB
ALBUMIN SERPL-MCNC: 4.2 G/DL (ref 3.5–5.2)
ALBUMIN/GLOB SERPL: 1.6 G/DL
ALP SERPL-CCNC: 152 U/L (ref 39–117)
ALT SERPL-CCNC: 9 U/L (ref 1–33)
AST SERPL-CCNC: 7 U/L (ref 1–32)
BASOPHILS # BLD AUTO: (no result) 10*3/UL
BASOPHILS # BLD MANUAL: 0.11 10*3/MM3 (ref 0–0.2)
BASOPHILS NFR BLD MANUAL: 2.1 % (ref 0–1.5)
BILIRUB SERPL-MCNC: 0.7 MG/DL (ref 0.2–1.2)
BUN SERPL-MCNC: 28 MG/DL (ref 8–23)
BUN/CREAT SERPL: 15.7 (ref 7–25)
CALCIUM SERPL-MCNC: 9.4 MG/DL (ref 8.6–10.5)
CHLORIDE SERPL-SCNC: 99 MMOL/L (ref 98–107)
CO2 SERPL-SCNC: 27.4 MMOL/L (ref 22–29)
CREAT SERPL-MCNC: 1.78 MG/DL (ref 0.57–1)
DIFFERENTIAL COMMENT: ABNORMAL
EOSINOPHIL # BLD AUTO: (no result) 10*3/UL
EOSINOPHIL # BLD MANUAL: 0.53 10*3/MM3 (ref 0–0.4)
EOSINOPHIL NFR BLD AUTO: (no result) %
EOSINOPHIL NFR BLD MANUAL: 10.4 % (ref 0.3–6.2)
ERYTHROCYTE [DISTWIDTH] IN BLOOD BY AUTOMATED COUNT: 22.6 % (ref 12.3–15.4)
GLOBULIN SER CALC-MCNC: 2.6 GM/DL
GLUCOSE SERPL-MCNC: 498 MG/DL (ref 65–99)
HCT VFR BLD AUTO: 32.9 % (ref 34–46.6)
HGB BLD-MCNC: 10.4 G/DL (ref 12–15.9)
LYMPHOCYTES # BLD AUTO: (no result) 10*3/UL
LYMPHOCYTES # BLD MANUAL: 1.34 10*3/MM3 (ref 0.7–3.1)
LYMPHOCYTES NFR BLD AUTO: (no result) %
LYMPHOCYTES NFR BLD MANUAL: 26 % (ref 19.6–45.3)
MCH RBC QN AUTO: 34.4 PG (ref 26.6–33)
MCHC RBC AUTO-ENTMCNC: 31.6 G/DL (ref 31.5–35.7)
MCV RBC AUTO: 108.9 FL (ref 79–97)
MONOCYTES # BLD MANUAL: 0.22 10*3/MM3 (ref 0.1–0.9)
MONOCYTES NFR BLD AUTO: (no result) %
MONOCYTES NFR BLD MANUAL: 4.2 % (ref 5–12)
NEUTROPHILS # BLD MANUAL: 2.95 10*3/MM3 (ref 1.7–7)
NEUTROPHILS NFR BLD AUTO: (no result) %
NEUTROPHILS NFR BLD MANUAL: 57.3 % (ref 42.7–76)
PLATELET # BLD AUTO: 315 10*3/MM3 (ref 140–450)
PLATELET BLD QL SMEAR: ABNORMAL
POTASSIUM SERPL-SCNC: 5.3 MMOL/L (ref 3.5–5.2)
PROT SERPL-MCNC: 6.8 G/DL (ref 6–8.5)
RBC # BLD AUTO: 3.02 10*6/MM3 (ref 3.77–5.28)
RBC MORPH BLD: ABNORMAL
SODIUM SERPL-SCNC: 137 MMOL/L (ref 136–145)
WBC # BLD AUTO: 5.14 10*3/MM3 (ref 3.4–10.8)

## 2019-04-25 ENCOUNTER — INFUSION (OUTPATIENT)
Dept: ONCOLOGY | Facility: HOSPITAL | Age: 79
End: 2019-04-25

## 2019-04-25 ENCOUNTER — APPOINTMENT (OUTPATIENT)
Dept: LAB | Facility: HOSPITAL | Age: 79
End: 2019-04-25

## 2019-04-25 VITALS
DIASTOLIC BLOOD PRESSURE: 72 MMHG | TEMPERATURE: 99 F | HEART RATE: 85 BPM | OXYGEN SATURATION: 89 % | SYSTOLIC BLOOD PRESSURE: 123 MMHG

## 2019-04-25 DIAGNOSIS — D46.C MYELODYSPLASTIC SYNDROME WITH 5Q DELETION (HCC): ICD-10-CM

## 2019-04-25 DIAGNOSIS — D47.2 MONOCLONAL GAMMOPATHY OF UNKNOWN SIGNIFICANCE (MGUS): ICD-10-CM

## 2019-04-25 DIAGNOSIS — D64.9 CHRONIC ANEMIA: ICD-10-CM

## 2019-04-25 LAB
BASOPHILS # BLD AUTO: 0.2 10*3/MM3 (ref 0–0.2)
BASOPHILS NFR BLD AUTO: 3.4 % (ref 0–1.5)
DEPRECATED RDW RBC AUTO: 81 FL (ref 37–54)
EOSINOPHIL # BLD AUTO: 0.67 10*3/MM3 (ref 0–0.4)
EOSINOPHIL NFR BLD AUTO: 11.3 % (ref 0.3–6.2)
ERYTHROCYTE [DISTWIDTH] IN BLOOD BY AUTOMATED COUNT: 21.8 % (ref 12.3–15.4)
HCT VFR BLD AUTO: 33 % (ref 34–46.6)
HGB BLD-MCNC: 10.8 G/DL (ref 12–15.9)
IMM GRANULOCYTES # BLD AUTO: 0.01 10*3/MM3 (ref 0–0.05)
IMM GRANULOCYTES NFR BLD AUTO: 0.2 % (ref 0–0.5)
LYMPHOCYTES # BLD AUTO: 1.71 10*3/MM3 (ref 0.7–3.1)
LYMPHOCYTES NFR BLD AUTO: 28.9 % (ref 19.6–45.3)
MCH RBC QN AUTO: 34.1 PG (ref 26.6–33)
MCHC RBC AUTO-ENTMCNC: 32.7 G/DL (ref 31.5–35.7)
MCV RBC AUTO: 104.1 FL (ref 79–97)
MONOCYTES # BLD AUTO: 0.32 10*3/MM3 (ref 0.1–0.9)
MONOCYTES NFR BLD AUTO: 5.4 % (ref 5–12)
NEUTROPHILS # BLD AUTO: 3.01 10*3/MM3 (ref 1.7–7)
NEUTROPHILS NFR BLD AUTO: 50.8 % (ref 42.7–76)
PLATELET # BLD AUTO: 317 10*3/MM3 (ref 140–450)
PMV BLD AUTO: 11.6 FL (ref 6–12)
RBC # BLD AUTO: 3.17 10*6/MM3 (ref 3.77–5.28)
WBC NRBC COR # BLD: 5.92 10*3/MM3 (ref 3.4–10.8)

## 2019-04-25 PROCEDURE — 36415 COLL VENOUS BLD VENIPUNCTURE: CPT

## 2019-04-25 PROCEDURE — 85025 COMPLETE CBC W/AUTO DIFF WBC: CPT | Performed by: INTERNAL MEDICINE

## 2019-04-25 NOTE — PROGRESS NOTES
Procrit not given today; pt's hgb is 10.8. Pt was informed and will followup as scheduled in one week.

## 2019-05-01 DIAGNOSIS — R06.02 SOB (SHORTNESS OF BREATH): Primary | ICD-10-CM

## 2019-05-01 DIAGNOSIS — R09.02 HYPOXIA: ICD-10-CM

## 2019-05-02 ENCOUNTER — LAB (OUTPATIENT)
Dept: LAB | Facility: HOSPITAL | Age: 79
End: 2019-05-02

## 2019-05-02 ENCOUNTER — INFUSION (OUTPATIENT)
Dept: ONCOLOGY | Facility: HOSPITAL | Age: 79
End: 2019-05-02

## 2019-05-02 DIAGNOSIS — D47.2 MONOCLONAL GAMMOPATHY OF UNKNOWN SIGNIFICANCE (MGUS): ICD-10-CM

## 2019-05-02 DIAGNOSIS — D64.9 CHRONIC ANEMIA: ICD-10-CM

## 2019-05-02 DIAGNOSIS — D46.C MYELODYSPLASTIC SYNDROME WITH 5Q DELETION (HCC): ICD-10-CM

## 2019-05-02 LAB
BASOPHILS # BLD AUTO: 0.18 10*3/MM3 (ref 0–0.2)
BASOPHILS NFR BLD AUTO: 3.1 % (ref 0–1.5)
DEPRECATED RDW RBC AUTO: 79.4 FL (ref 37–54)
EOSINOPHIL # BLD AUTO: 0.58 10*3/MM3 (ref 0–0.4)
EOSINOPHIL NFR BLD AUTO: 10.1 % (ref 0.3–6.2)
ERYTHROCYTE [DISTWIDTH] IN BLOOD BY AUTOMATED COUNT: 21.5 % (ref 12.3–15.4)
HCT VFR BLD AUTO: 29.4 % (ref 34–46.6)
HGB BLD-MCNC: 10.2 G/DL (ref 12–15.9)
IMM GRANULOCYTES # BLD AUTO: 0.04 10*3/MM3 (ref 0–0.05)
IMM GRANULOCYTES NFR BLD AUTO: 0.7 % (ref 0–0.5)
LYMPHOCYTES # BLD AUTO: 2.26 10*3/MM3 (ref 0.7–3.1)
LYMPHOCYTES NFR BLD AUTO: 39.5 % (ref 19.6–45.3)
MCH RBC QN AUTO: 35.4 PG (ref 26.6–33)
MCHC RBC AUTO-ENTMCNC: 34.7 G/DL (ref 31.5–35.7)
MCV RBC AUTO: 102.1 FL (ref 79–97)
MONOCYTES # BLD AUTO: 0.26 10*3/MM3 (ref 0.1–0.9)
MONOCYTES NFR BLD AUTO: 4.5 % (ref 5–12)
NEUTROPHILS # BLD AUTO: 2.4 10*3/MM3 (ref 1.7–7)
NEUTROPHILS NFR BLD AUTO: 42.1 % (ref 42.7–76)
PLATELET # BLD AUTO: 410 10*3/MM3 (ref 140–450)
PMV BLD AUTO: 11.5 FL (ref 6–12)
RBC # BLD AUTO: 2.88 10*6/MM3 (ref 3.77–5.28)
WBC NRBC COR # BLD: 5.72 10*3/MM3 (ref 3.4–10.8)

## 2019-05-02 PROCEDURE — 85025 COMPLETE CBC W/AUTO DIFF WBC: CPT | Performed by: INTERNAL MEDICINE

## 2019-05-02 PROCEDURE — 36415 COLL VENOUS BLD VENIPUNCTURE: CPT

## 2019-05-02 NOTE — PROGRESS NOTES
Procrit held for hgb of 10.2. Pt was informed and v/u. She will followup as scheduled in one week for labs and possible procrit.

## 2019-05-09 ENCOUNTER — INFUSION (OUTPATIENT)
Dept: ONCOLOGY | Facility: HOSPITAL | Age: 79
End: 2019-05-09

## 2019-05-09 ENCOUNTER — LAB (OUTPATIENT)
Dept: LAB | Facility: HOSPITAL | Age: 79
End: 2019-05-09

## 2019-05-09 DIAGNOSIS — D46.C MYELODYSPLASTIC SYNDROME WITH 5Q DELETION (HCC): ICD-10-CM

## 2019-05-09 DIAGNOSIS — D47.2 MONOCLONAL GAMMOPATHY OF UNKNOWN SIGNIFICANCE (MGUS): ICD-10-CM

## 2019-05-09 DIAGNOSIS — D64.9 CHRONIC ANEMIA: ICD-10-CM

## 2019-05-09 LAB
BASOPHILS # BLD AUTO: 0.09 10*3/MM3 (ref 0–0.2)
BASOPHILS NFR BLD AUTO: 1.6 % (ref 0–1.5)
DEPRECATED RDW RBC AUTO: 78.3 FL (ref 37–54)
EOSINOPHIL # BLD AUTO: 0.62 10*3/MM3 (ref 0–0.4)
EOSINOPHIL NFR BLD AUTO: 11.1 % (ref 0.3–6.2)
ERYTHROCYTE [DISTWIDTH] IN BLOOD BY AUTOMATED COUNT: 21.3 % (ref 12.3–15.4)
HCT VFR BLD AUTO: 30.6 % (ref 34–46.6)
HGB BLD-MCNC: 10.4 G/DL (ref 12–15.9)
IMM GRANULOCYTES # BLD AUTO: 0.03 10*3/MM3 (ref 0–0.05)
IMM GRANULOCYTES NFR BLD AUTO: 0.5 % (ref 0–0.5)
LYMPHOCYTES # BLD AUTO: 2.41 10*3/MM3 (ref 0.7–3.1)
LYMPHOCYTES NFR BLD AUTO: 43.1 % (ref 19.6–45.3)
MCH RBC QN AUTO: 34.6 PG (ref 26.6–33)
MCHC RBC AUTO-ENTMCNC: 34 G/DL (ref 31.5–35.7)
MCV RBC AUTO: 101.7 FL (ref 79–97)
MONOCYTES # BLD AUTO: 0.23 10*3/MM3 (ref 0.1–0.9)
MONOCYTES NFR BLD AUTO: 4.1 % (ref 5–12)
NEUTROPHILS # BLD AUTO: 2.21 10*3/MM3 (ref 1.7–7)
NEUTROPHILS NFR BLD AUTO: 39.6 % (ref 42.7–76)
NRBC BLD AUTO-RTO: 0 /100 WBC (ref 0–0.2)
PLATELET # BLD AUTO: 395 10*3/MM3 (ref 140–450)
PMV BLD AUTO: 11.5 FL (ref 6–12)
RBC # BLD AUTO: 3.01 10*6/MM3 (ref 3.77–5.28)
WBC NRBC COR # BLD: 5.59 10*3/MM3 (ref 3.4–10.8)

## 2019-05-09 PROCEDURE — 36415 COLL VENOUS BLD VENIPUNCTURE: CPT

## 2019-05-09 PROCEDURE — 85025 COMPLETE CBC W/AUTO DIFF WBC: CPT | Performed by: INTERNAL MEDICINE

## 2019-05-16 ENCOUNTER — LAB (OUTPATIENT)
Dept: LAB | Facility: HOSPITAL | Age: 79
End: 2019-05-16

## 2019-05-16 ENCOUNTER — INFUSION (OUTPATIENT)
Dept: ONCOLOGY | Facility: HOSPITAL | Age: 79
End: 2019-05-16

## 2019-05-16 VITALS — TEMPERATURE: 98.8 F

## 2019-05-16 DIAGNOSIS — D64.9 ANEMIA REQUIRING TRANSFUSIONS: Primary | ICD-10-CM

## 2019-05-16 DIAGNOSIS — D64.9 CHRONIC ANEMIA: ICD-10-CM

## 2019-05-16 DIAGNOSIS — E11.22 CKD STAGE 3 DUE TO TYPE 2 DIABETES MELLITUS (HCC): ICD-10-CM

## 2019-05-16 DIAGNOSIS — N18.30 CKD STAGE 3 DUE TO TYPE 2 DIABETES MELLITUS (HCC): ICD-10-CM

## 2019-05-16 DIAGNOSIS — D47.2 MONOCLONAL GAMMOPATHY OF UNKNOWN SIGNIFICANCE (MGUS): ICD-10-CM

## 2019-05-16 DIAGNOSIS — D46.C MYELODYSPLASTIC SYNDROME WITH 5Q DELETION (HCC): ICD-10-CM

## 2019-05-16 LAB
ALBUMIN SERPL-MCNC: 3.8 G/DL (ref 3.5–5.2)
ALBUMIN/GLOB SERPL: 1.3 G/DL
ALP SERPL-CCNC: 135 U/L (ref 39–117)
ALT SERPL W P-5'-P-CCNC: 17 U/L (ref 1–33)
ANION GAP SERPL CALCULATED.3IONS-SCNC: 11.3 MMOL/L
AST SERPL-CCNC: 15 U/L (ref 1–32)
BASOPHILS # BLD AUTO: 0.17 10*3/MM3 (ref 0–0.2)
BASOPHILS NFR BLD AUTO: 2.1 % (ref 0–1.5)
BILIRUB SERPL-MCNC: 0.8 MG/DL (ref 0.2–1.2)
BUN BLD-MCNC: 24 MG/DL (ref 8–23)
BUN/CREAT SERPL: 12.2 (ref 7–25)
CALCIUM SPEC-SCNC: 9.1 MG/DL (ref 8.6–10.5)
CHLORIDE SERPL-SCNC: 100 MMOL/L (ref 98–107)
CO2 SERPL-SCNC: 24.7 MMOL/L (ref 22–29)
CREAT BLD-MCNC: 1.96 MG/DL (ref 0.57–1)
DEPRECATED RDW RBC AUTO: 81.6 FL (ref 37–54)
EOSINOPHIL # BLD AUTO: 0.35 10*3/MM3 (ref 0–0.4)
EOSINOPHIL NFR BLD AUTO: 4.4 % (ref 0.3–6.2)
ERYTHROCYTE [DISTWIDTH] IN BLOOD BY AUTOMATED COUNT: 21.3 % (ref 12.3–15.4)
GFR SERPL CREATININE-BSD FRML MDRD: 25 ML/MIN/1.73
GLOBULIN UR ELPH-MCNC: 2.9 GM/DL
GLUCOSE BLD-MCNC: 367 MG/DL (ref 65–99)
HCT VFR BLD AUTO: 28.5 % (ref 34–46.6)
HGB BLD-MCNC: 9.3 G/DL (ref 12–15.9)
IMM GRANULOCYTES # BLD AUTO: 0.13 10*3/MM3 (ref 0–0.05)
IMM GRANULOCYTES NFR BLD AUTO: 1.6 % (ref 0–0.5)
LYMPHOCYTES # BLD AUTO: 1.84 10*3/MM3 (ref 0.7–3.1)
LYMPHOCYTES NFR BLD AUTO: 22.9 % (ref 19.6–45.3)
MCH RBC QN AUTO: 34.6 PG (ref 26.6–33)
MCHC RBC AUTO-ENTMCNC: 32.6 G/DL (ref 31.5–35.7)
MCV RBC AUTO: 105.9 FL (ref 79–97)
MONOCYTES # BLD AUTO: 0.33 10*3/MM3 (ref 0.1–0.9)
MONOCYTES NFR BLD AUTO: 4.1 % (ref 5–12)
NEUTROPHILS # BLD AUTO: 5.21 10*3/MM3 (ref 1.7–7)
NEUTROPHILS NFR BLD AUTO: 64.9 % (ref 42.7–76)
PLATELET # BLD AUTO: 345 10*3/MM3 (ref 140–450)
PMV BLD AUTO: 11.2 FL (ref 6–12)
POTASSIUM BLD-SCNC: 4.2 MMOL/L (ref 3.5–5.2)
PROT SERPL-MCNC: 6.7 G/DL (ref 6–8.5)
RBC # BLD AUTO: 2.69 10*6/MM3 (ref 3.77–5.28)
SODIUM BLD-SCNC: 136 MMOL/L (ref 136–145)
WBC NRBC COR # BLD: 8.03 10*3/MM3 (ref 3.4–10.8)

## 2019-05-16 PROCEDURE — 85025 COMPLETE CBC W/AUTO DIFF WBC: CPT | Performed by: INTERNAL MEDICINE

## 2019-05-16 PROCEDURE — 82784 ASSAY IGA/IGD/IGG/IGM EACH: CPT | Performed by: INTERNAL MEDICINE

## 2019-05-16 PROCEDURE — 86334 IMMUNOFIX E-PHORESIS SERUM: CPT | Performed by: INTERNAL MEDICINE

## 2019-05-16 PROCEDURE — 36415 COLL VENOUS BLD VENIPUNCTURE: CPT

## 2019-05-16 PROCEDURE — 84165 PROTEIN E-PHORESIS SERUM: CPT | Performed by: INTERNAL MEDICINE

## 2019-05-16 PROCEDURE — 83883 ASSAY NEPHELOMETRY NOT SPEC: CPT | Performed by: INTERNAL MEDICINE

## 2019-05-16 PROCEDURE — 96372 THER/PROPH/DIAG INJ SC/IM: CPT | Performed by: NURSE PRACTITIONER

## 2019-05-16 PROCEDURE — 80053 COMPREHEN METABOLIC PANEL: CPT | Performed by: INTERNAL MEDICINE

## 2019-05-16 PROCEDURE — 25010000002 EPOETIN ALFA PER 1000 UNITS: Performed by: NURSE PRACTITIONER

## 2019-05-16 RX ADMIN — ERYTHROPOIETIN 40000 UNITS: 40000 INJECTION, SOLUTION INTRAVENOUS; SUBCUTANEOUS at 09:52

## 2019-05-17 ENCOUNTER — TELEPHONE (OUTPATIENT)
Dept: INTERNAL MEDICINE | Facility: CLINIC | Age: 79
End: 2019-05-17

## 2019-05-17 LAB
ALBUMIN SERPL-MCNC: 3.6 G/DL (ref 2.9–4.4)
ALBUMIN/GLOB SERPL: 1.2 {RATIO} (ref 0.7–1.7)
ALPHA1 GLOB FLD ELPH-MCNC: 0.4 G/DL (ref 0–0.4)
ALPHA2 GLOB SERPL ELPH-MCNC: 0.7 G/DL (ref 0.4–1)
B-GLOBULIN SERPL ELPH-MCNC: 1 G/DL (ref 0.7–1.3)
GAMMA GLOB SERPL ELPH-MCNC: 0.9 G/DL (ref 0.4–1.8)
GLOBULIN SER CALC-MCNC: 3.1 G/DL (ref 2.2–3.9)
IGA SERPL-MCNC: 509 MG/DL (ref 64–422)
IGG SERPL-MCNC: 724 MG/DL (ref 700–1600)
IGM SERPL-MCNC: 182 MG/DL (ref 26–217)
KAPPA LC SERPL-MCNC: 64.4 MG/L (ref 3.3–19.4)
KAPPA LC/LAMBDA SER: 1.6 {RATIO} (ref 0.26–1.65)
LAMBDA LC FREE SERPL-MCNC: 40.3 MG/L (ref 5.7–26.3)
Lab: NORMAL
M-SPIKE: NORMAL G/DL
PROT PATTERN SERPL IFE-IMP: ABNORMAL
PROT SERPL-MCNC: 6.7 G/DL (ref 6–8.5)

## 2019-05-17 RX ORDER — BENZONATATE 200 MG/1
200 CAPSULE ORAL 3 TIMES DAILY PRN
Qty: 20 CAPSULE | Refills: 0 | Status: SHIPPED | OUTPATIENT
Start: 2019-05-17 | End: 2019-06-18

## 2019-05-17 RX ORDER — FLUTICASONE PROPIONATE 50 MCG
2 SPRAY, SUSPENSION (ML) NASAL DAILY
Qty: 16 G | Refills: 0 | Status: SHIPPED | OUTPATIENT
Start: 2019-05-17 | End: 2020-04-09

## 2019-05-17 NOTE — TELEPHONE ENCOUNTER
Pt  Aware  Sent to pharmacy      ----- Message from Chari Mercado MD sent at 5/17/2019 12:42 PM EDT -----  Use flonase bid and ok for tessalon perles 200mg tid  #20    ----- Message -----  From: Gaviota Bowles MA  Sent: 5/17/2019  10:29 AM  To: Chari Mercado MD        ----- Message -----  From: Gaviota Bowles MA  Sent: 5/17/2019  10:28 AM  To: Gaviota Bowles MA    PT  CALLED SHE HAS  TERRIBLE COUGH, IS COUGHING NIGHT AND DAY.   WANTS TO KNOW IF YOU CAN SEEN SOMETHING IN FOR HER.     [phone]

## 2019-05-21 ENCOUNTER — OFFICE VISIT (OUTPATIENT)
Dept: INTERNAL MEDICINE | Facility: CLINIC | Age: 79
End: 2019-05-21

## 2019-05-21 VITALS
DIASTOLIC BLOOD PRESSURE: 68 MMHG | TEMPERATURE: 98.4 F | HEART RATE: 89 BPM | OXYGEN SATURATION: 92 % | BODY MASS INDEX: 36.58 KG/M2 | HEIGHT: 62 IN | SYSTOLIC BLOOD PRESSURE: 116 MMHG | RESPIRATION RATE: 22 BRPM

## 2019-05-21 DIAGNOSIS — E03.9 ACQUIRED HYPOTHYROIDISM: ICD-10-CM

## 2019-05-21 DIAGNOSIS — E11.22 CKD STAGE 3 DUE TO TYPE 2 DIABETES MELLITUS (HCC): ICD-10-CM

## 2019-05-21 DIAGNOSIS — I10 ESSENTIAL HYPERTENSION: ICD-10-CM

## 2019-05-21 DIAGNOSIS — D46.C MYELODYSPLASTIC SYNDROME WITH 5Q DELETION (HCC): Primary | ICD-10-CM

## 2019-05-21 DIAGNOSIS — E78.2 MIXED HYPERLIPIDEMIA: ICD-10-CM

## 2019-05-21 DIAGNOSIS — N18.30 CKD STAGE 3 DUE TO TYPE 2 DIABETES MELLITUS (HCC): ICD-10-CM

## 2019-05-21 DIAGNOSIS — J40 BRONCHITIS: ICD-10-CM

## 2019-05-21 PROCEDURE — 99215 OFFICE O/P EST HI 40 MIN: CPT | Performed by: INTERNAL MEDICINE

## 2019-05-21 RX ORDER — ALBUTEROL SULFATE 90 UG/1
2 AEROSOL, METERED RESPIRATORY (INHALATION) EVERY 4 HOURS PRN
Qty: 1 INHALER | Refills: 3 | Status: SHIPPED | OUTPATIENT
Start: 2019-05-21 | End: 2019-12-18 | Stop reason: SDUPTHER

## 2019-05-21 RX ORDER — CEFUROXIME AXETIL 250 MG/1
250 TABLET ORAL 2 TIMES DAILY
Qty: 20 TABLET | Refills: 0 | Status: SHIPPED | OUTPATIENT
Start: 2019-05-21 | End: 2019-05-31

## 2019-05-21 NOTE — PROGRESS NOTES
Joya Singh is a 79 y.o. female, who presents with a chief complaint of   Chief Complaint   Patient presents with   • Cough   • Diabetes       HPI   Pt here with her daughter-in-law.      Myelodysplastic syndrome - family with multiple questions.  Pt's last transfusion about 1 month ago.  She has had a stable hgb on the increased procrit dose.     She has hypertension complicated by orthostatic hypotension. Currently doing ok with midodrine.        Diabetes mellitus - Pt's glucoses are better.  She is trying to eat better but still craves snacks.  I will not check an A1c bc she had a PRBC transfusion within the past 4 weeks.  .  Overall she has had 20 units of PRBC's in the past 6 months.  Her weight has been stable.      HLD - on statin.  No cramps or myalgias.     Hypothyroidism - on synthroid and doing well.  No hair/skin changes.     She has been very congested and has a bad cough that just wont go away    The following portions of the patient's history were reviewed and updated as appropriate: allergies, current medications, past family history, past medical history, past social history, past surgical history and problem list.    Allergies: Baclofen; Codeine; Lisinopril; Morphine; and Penicillins    Review of Systems   Constitutional: Positive for fatigue. Negative for fever.   HENT: Positive for congestion.    Eyes: Negative.    Respiratory: Positive for cough.    Cardiovascular: Negative.    Gastrointestinal: Negative.    Endocrine: Negative.    Genitourinary: Negative.    Musculoskeletal: Negative.    Skin: Negative.    Allergic/Immunologic: Negative.    Neurological: Negative.    Hematological: Negative.    Psychiatric/Behavioral: Negative.    All other systems reviewed and are negative.            Wt Readings from Last 3 Encounters:   04/23/19 90.7 kg (200 lb)   04/19/19 92 kg (202 lb 13.2 oz)   04/04/19 92 kg (202 lb 14.4 oz)     Temp Readings from Last 3 Encounters:   05/21/19 98.4 °F (36.9 °C)    05/16/19 98.8 °F (37.1 °C)   04/25/19 99 °F (37.2 °C)     BP Readings from Last 3 Encounters:   05/21/19 116/68   04/25/19 123/72   04/23/19 118/84     Pulse Readings from Last 3 Encounters:   05/21/19 89   04/25/19 85   04/23/19 97     Body mass index is 36.58 kg/m².  @LASTSAO2(3)@    Physical Exam   Constitutional: She is oriented to person, place, and time. She appears well-developed and well-nourished. No distress.   HENT:   Head: Normocephalic and atraumatic.   Right Ear: External ear normal.   Left Ear: External ear normal.   Nose: Nose normal.   Mouth/Throat: Oropharynx is clear and moist.   Eyes: Conjunctivae and EOM are normal. Pupils are equal, round, and reactive to light.   Neck: Normal range of motion. Neck supple.   Cardiovascular: Normal rate, regular rhythm, normal heart sounds and intact distal pulses.   Pulmonary/Chest: Effort normal and breath sounds normal. No respiratory distress. She has no wheezes.   Musculoskeletal: Normal range of motion.   Normal gait   Neurological: She is alert and oriented to person, place, and time.   Skin: Skin is warm and dry.   Psychiatric: She has a normal mood and affect. Her behavior is normal. Judgment and thought content normal.   Nursing note and vitals reviewed.      Results for orders placed or performed in visit on 05/16/19   Comprehensive Metabolic Panel   Result Value Ref Range    Glucose 367 (H) 65 - 99 mg/dL    BUN 24 (H) 8 - 23 mg/dL    Creatinine 1.96 (H) 0.57 - 1.00 mg/dL    Sodium 136 136 - 145 mmol/L    Potassium 4.2 3.5 - 5.2 mmol/L    Chloride 100 98 - 107 mmol/L    CO2 24.7 22.0 - 29.0 mmol/L    Calcium 9.1 8.6 - 10.5 mg/dL    Total Protein 6.7 6.0 - 8.5 g/dL    Albumin 3.80 3.50 - 5.20 g/dL    ALT (SGPT) 17 1 - 33 U/L    AST (SGOT) 15 1 - 32 U/L    Alkaline Phosphatase 135 (H) 39 - 117 U/L    Total Bilirubin 0.8 0.2 - 1.2 mg/dL    eGFR Non African Amer 25 (L) >60 mL/min/1.73    Globulin 2.9 gm/dL    A/G Ratio 1.3 g/dL    BUN/Creatinine Ratio  12.2 7.0 - 25.0    Anion Gap 11.3 mmol/L   Protein Electrophoresis, Total   Result Value Ref Range    Total Protein 6.7 6.0 - 8.5 g/dL    Albumin 3.6 2.9 - 4.4 g/dL    Alpha-1-Globulin 0.4 0.0 - 0.4 g/dL    Alpha-2-Globulin 0.7 0.4 - 1.0 g/dL    Beta Globulin 1.0 0.7 - 1.3 g/dL    Gamma Globulin 0.9 0.4 - 1.8 g/dL    M-Sid Not Observed Not Observed g/dL    Globulin 3.1 2.2 - 3.9 g/dL    A/G Ratio 1.2 0.7 - 1.7    Please note Comment    Immunofixation, Serum   Result Value Ref Range    Immunofixation Result, Serum Comment     IgG 724 700 - 1600 mg/dL    IgA 509 (H) 64 - 422 mg/dL    IgM 182 26 - 217 mg/dL   Immunoglobulin Free LT Chains Blood   Result Value Ref Range    Free Light Chain, Kappa 64.4 (H) 3.3 - 19.4 mg/L    Free Lambda Light Chains 40.3 (H) 5.7 - 26.3 mg/L    Kappa/Lambda Ratio 1.60 0.26 - 1.65   CBC Auto Differential   Result Value Ref Range    WBC 8.03 3.40 - 10.80 10*3/mm3    RBC 2.69 (L) 3.77 - 5.28 10*6/mm3    Hemoglobin 9.3 (L) 12.0 - 15.9 g/dL    Hematocrit 28.5 (L) 34.0 - 46.6 %    .9 (H) 79.0 - 97.0 fL    MCH 34.6 (H) 26.6 - 33.0 pg    MCHC 32.6 31.5 - 35.7 g/dL    RDW 21.3 (H) 12.3 - 15.4 %    RDW-SD 81.6 (H) 37.0 - 54.0 fl    MPV 11.2 6.0 - 12.0 fL    Platelets 345 140 - 450 10*3/mm3    Neutrophil % 64.9 42.7 - 76.0 %    Lymphocyte % 22.9 19.6 - 45.3 %    Monocyte % 4.1 (L) 5.0 - 12.0 %    Eosinophil % 4.4 0.3 - 6.2 %    Basophil % 2.1 (H) 0.0 - 1.5 %    Immature Grans % 1.6 (H) 0.0 - 0.5 %    Neutrophils, Absolute 5.21 1.70 - 7.00 10*3/mm3    Lymphocytes, Absolute 1.84 0.70 - 3.10 10*3/mm3    Monocytes, Absolute 0.33 0.10 - 0.90 10*3/mm3    Eosinophils, Absolute 0.35 0.00 - 0.40 10*3/mm3    Basophils, Absolute 0.17 0.00 - 0.20 10*3/mm3    Immature Grans, Absolute 0.13 (H) 0.00 - 0.05 10*3/mm3           Joya was seen today for cough and diabetes.    Diagnoses and all orders for this visit:    Myelodysplastic syndrome with 5q deletion (CMS/HCC)    Mixed hyperlipidemia    Essential  hypertension    Acquired hypothyroidism    CKD stage 3 due to type 2 diabetes mellitus (CMS/[facility])    Bronchitis  -     cefuroxime (CEFTIN) 250 MG tablet; Take 1 tablet by mouth 2 (Two) Times a Day for 10 days.    Other orders  -     albuterol sulfate  (90 Base) MCG/ACT inhaler; Inhale 2 puffs Every 4 (Four) Hours As Needed for Wheezing.      40 min spent with >50% in consultation about chronic medical issues and MDS.  Discussed overview of disease, prognosis, treatment options with patient and her daugther-in law.  Answered questions.  Pt desires a good quality of life over quantity.  Pt excited about upcoming [facility] and I encouraged her to go enjoy herself on the trip.    Outpatient Medications Prior to Visit   Medication Sig Dispense Refill   • ACCU-CHEK FASTCLIX LANCETS misc TEST 3-4 TIMES DAILY AS DIRECTED 400 each 3   • ACCU-CHEK SMARTVIEW test strip TEST blood sugar three times daily OR AS DIRECTED 300 each 3   • acetaminophen (TYLENOL) 325 MG tablet Take 2 tablets by mouth Every 6 (Six) Hours As Needed for Mild Pain . OTC product 40 tablet 0   • Alcohol Swabs (B-D SINGLE USE SWABS REGULAR) pads      • atorvastatin (LIPITOR) 10 MG tablet TAKE ONE TABLET BY MOUTH AT BEDTIME 90 tablet 1   • benzonatate (TESSALON) 200 MG capsule Take 1 capsule by mouth 3 (Three) Times a Day As Needed for Cough. 20 capsule 0   • Blood Glucose Monitoring Suppl (ACCU-CHEK KENNETH SMARTVIEW) w/Device kit TEST blood sugar three times daily or as directed 1 kit 0   • cyclobenzaprine (FLEXERIL) 10 MG tablet TAKE ONE TABLET BY MOUTH TWICE DAILY as needed for muscle spasms 30 tablet 0   • diphenhydrAMINE (BENADRYL) 25 mg capsule Take 25 mg by mouth Every 6 (Six) Hours As Needed for Itching.     • DULoxetine (CYMBALTA) 30 MG capsule Take 1 capsule by mouth 2 (Two) Times a Day. 60 capsule 5   • fluticasone (FLONASE) 50 MCG/ACT nasal spray 2 sprays into the nostril(s) as directed by provider Daily. 16 g 0   • furosemide (LASIX)  "20 MG tablet TAKE ONE (1) TABLET ORALLY (BY MOUTH) ONCE DAILY 90 tablet 1   • gabapentin (NEURONTIN) 400 MG capsule TAKE ONE CAPSULE BY MOUTH EVERY MORNING, ONE AT NOON, AND TWO AT BEDTIME 120 capsule 5   • HYDROcodone-acetaminophen (NORCO) 5-325 MG per tablet Take one po bid    Prn  Moderate pain 60 tablet 0   • insulin aspart (novoLOG FLEXPEN) 100 UNIT/ML solution pen-injector sc pen Inject 54 Units under the skin into the appropriate area as directed Every Morning Before Breakfast. 30 units with breakfast 35 units with lunch 35 units with dinner      • insulin aspart (novoLOG) 100 UNIT/ML injection Inject 15 Units under the skin into the appropriate area as directed Daily With Breakfast. 15 units with breakfast 25 units with lunch 25 units with dinner     • Insulin Degludec (TRESIBA FLEXTOUCH) 200 UNIT/ML solution pen-injector Inject 54 Units under the skin into the appropriate area as directed every night at bedtime. 9 mL 11   • levothyroxine (SYNTHROID, LEVOTHROID) 88 MCG tablet TAKE ONE TABLET BY MOUTH EVERY DAY 90 tablet 1   • midodrine (PROAMATINE) 2.5 MG tablet Take 1 tablet by mouth 3 (Three) Times a Day. 90 tablet 3   • Multiple Vitamins-Minerals (CENTRUM SILVER PO) Take  by mouth Daily.     • Needle, Disp, (BD DISP NEEDLES) 30G X 1/2\" misc To be used 3 times daily with Novolog Flexpen. 100 each 5   • nitrofurantoin, macrocrystal-monohydrate, (MACROBID) 100 MG capsule TAKE ONE PO BID   X  7 DAYS 14 capsule 0   • O2 (OXYGEN) Inhale 2 L/min Every Night. Uses 2L  overnight only     • omeprazole (priLOSEC) 40 MG capsule TAKE ONE CAPSULE BY MOUTH EVERY DAY 90 capsule 1   • potassium chloride (K-DUR) 10 MEQ CR tablet TAKE ONE (1) TABLET ORALLY (BY MOUTH) ONCE DAILY 90 tablet 2   • promethazine (PHENERGAN) 12.5 MG tablet 1/2 to 1 tablet every 6 hours as needed for nausea 20 tablet 0     No facility-administered medications prior to visit.      New Medications Ordered This Visit   Medications   • albuterol " sulfate  (90 Base) MCG/ACT inhaler     Sig: Inhale 2 puffs Every 4 (Four) Hours As Needed for Wheezing.     Dispense:  1 inhaler     Refill:  3   • cefuroxime (CEFTIN) 250 MG tablet     Sig: Take 1 tablet by mouth 2 (Two) Times a Day for 10 days.     Dispense:  20 tablet     Refill:  0     [unfilled]  There are no discontinued medications.      Return in about 1 month (around 6/21/2019) for Recheck.

## 2019-05-22 ENCOUNTER — OFFICE VISIT (OUTPATIENT)
Dept: ONCOLOGY | Facility: CLINIC | Age: 79
End: 2019-05-22

## 2019-05-22 ENCOUNTER — INFUSION (OUTPATIENT)
Dept: ONCOLOGY | Facility: HOSPITAL | Age: 79
End: 2019-05-22

## 2019-05-22 ENCOUNTER — LAB (OUTPATIENT)
Dept: LAB | Facility: HOSPITAL | Age: 79
End: 2019-05-22

## 2019-05-22 VITALS
DIASTOLIC BLOOD PRESSURE: 63 MMHG | BODY MASS INDEX: 37.17 KG/M2 | HEART RATE: 90 BPM | RESPIRATION RATE: 14 BRPM | OXYGEN SATURATION: 95 % | WEIGHT: 202 LBS | HEIGHT: 62 IN | SYSTOLIC BLOOD PRESSURE: 106 MMHG | TEMPERATURE: 97.9 F

## 2019-05-22 DIAGNOSIS — D46.C MYELODYSPLASTIC SYNDROME WITH 5Q DELETION (HCC): ICD-10-CM

## 2019-05-22 DIAGNOSIS — D47.2 MONOCLONAL GAMMOPATHY OF UNKNOWN SIGNIFICANCE (MGUS): ICD-10-CM

## 2019-05-22 DIAGNOSIS — D64.9 CHRONIC ANEMIA: ICD-10-CM

## 2019-05-22 DIAGNOSIS — D47.2 MONOCLONAL GAMMOPATHY OF UNKNOWN SIGNIFICANCE (MGUS): Primary | ICD-10-CM

## 2019-05-22 DIAGNOSIS — N18.30 CKD STAGE 3 DUE TO TYPE 2 DIABETES MELLITUS (HCC): ICD-10-CM

## 2019-05-22 DIAGNOSIS — E11.22 CKD STAGE 3 DUE TO TYPE 2 DIABETES MELLITUS (HCC): ICD-10-CM

## 2019-05-22 LAB
BASOPHILS # BLD AUTO: 0.11 10*3/MM3 (ref 0–0.2)
BASOPHILS NFR BLD AUTO: 1.5 % (ref 0–1.5)
DEPRECATED RDW RBC AUTO: 81.8 FL (ref 37–54)
EOSINOPHIL # BLD AUTO: 0.37 10*3/MM3 (ref 0–0.4)
EOSINOPHIL NFR BLD AUTO: 4.9 % (ref 0.3–6.2)
ERYTHROCYTE [DISTWIDTH] IN BLOOD BY AUTOMATED COUNT: 22 % (ref 12.3–15.4)
HCT VFR BLD AUTO: 30.4 % (ref 34–46.6)
HGB BLD-MCNC: 10.3 G/DL (ref 12–15.9)
IMM GRANULOCYTES # BLD AUTO: 0.09 10*3/MM3 (ref 0–0.05)
IMM GRANULOCYTES NFR BLD AUTO: 1.2 % (ref 0–0.5)
LYMPHOCYTES # BLD AUTO: 2.55 10*3/MM3 (ref 0.7–3.1)
LYMPHOCYTES NFR BLD AUTO: 33.6 % (ref 19.6–45.3)
MCH RBC QN AUTO: 35.4 PG (ref 26.6–33)
MCHC RBC AUTO-ENTMCNC: 33.9 G/DL (ref 31.5–35.7)
MCV RBC AUTO: 104.5 FL (ref 79–97)
MONOCYTES # BLD AUTO: 0.46 10*3/MM3 (ref 0.1–0.9)
MONOCYTES NFR BLD AUTO: 6.1 % (ref 5–12)
NEUTROPHILS # BLD AUTO: 4 10*3/MM3 (ref 1.7–7)
NEUTROPHILS NFR BLD AUTO: 52.7 % (ref 42.7–76)
NRBC BLD AUTO-RTO: 0.4 /100 WBC (ref 0–0.2)
PLATELET # BLD AUTO: 358 10*3/MM3 (ref 140–450)
PMV BLD AUTO: 11.9 FL (ref 6–12)
RBC # BLD AUTO: 2.91 10*6/MM3 (ref 3.77–5.28)
WBC NRBC COR # BLD: 7.58 10*3/MM3 (ref 3.4–10.8)

## 2019-05-22 PROCEDURE — 36415 COLL VENOUS BLD VENIPUNCTURE: CPT

## 2019-05-22 PROCEDURE — 85025 COMPLETE CBC W/AUTO DIFF WBC: CPT | Performed by: INTERNAL MEDICINE

## 2019-05-22 PROCEDURE — 99214 OFFICE O/P EST MOD 30 MIN: CPT | Performed by: INTERNAL MEDICINE

## 2019-05-22 RX ORDER — BLOOD SUGAR DIAGNOSTIC
STRIP MISCELLANEOUS
Qty: 300 EACH | Refills: 3 | Status: SHIPPED | OUTPATIENT
Start: 2019-05-22 | End: 2022-01-01 | Stop reason: HOSPADM

## 2019-05-22 NOTE — PROGRESS NOTES
Subjective     REASON FOR FOLLOW-UP:   1.  Macrocytic anemia secondary to myelodysplastic syndrome with 5 q. minus and chronic kidney disease  2.  Monoclonal gammopathy of undetermined significance                             REQUESTING PHYSICIAN:  Dr. Baugh    RECORDS OBTAINED:  Records of the patients history including those obtained from the referring provider were reviewed and summarized in detail.    History of Present Illness   The patient return today for follow-up.  She is doing reasonably well.  Her last blood transfusion was after a visit on 4/18/2019.  She denies significant lightheadedness or dizziness today.  She does complain of congestion and leg pain.    Hematology History:  Patient presented as 78-year-old woman for evaluation of anemia.  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  She has stage III chronic kidney disease followed by Dr. Garza of nephrology.  Reviewing her records, the patient has had anemia present since at least 2014 but her hemoglobin has worsened over the past 6 months.  The patient was admitted to the hospital in October 2018 with symptomatic anemia, shortness of breath and lightheadedness.  She was found to have hemoglobin of 7.0.  There was concern for GI blood loss although EGD and colonoscopy performed showed no obvious etiology of blood loss.  The patient states that she was transfused 7 units of packed red blood cells during the hospital stay.  I do not see any Hemoccult results.  She was previously on Eliquis which was discontinued.  Since discharge in October, the patient has been receiving weekly Procrit 20,000 units in the [facility]U at [place], but despite Procrit she has required transfusion on 2 separate occasions.  She is not seeing any bright red blood per rectum or melena.  She complains of fatigue and lightheadedness.    Recent iron profile on 1/17/19 was inconsistent with iron  deficiency with an iron saturation 68% and the ferritin was 352.  The red blood cells are macrocytic.  White blood cell and platelet counts have been normal.    The patient was seen in hematology on 1/29/19 and additional evaluation performed.  The reticulocyte count was elevated 3.72% but the haptoglobin and LDH were both normal arguing against a hemolytic process.  B12 and folic acid levels were normal.  Serum protein electrophoresis showed no M spike but the immunofixation identified a small IgA monoclonal protein with lambda specificity; IgA level 544.  Sedimentation rate elevated 58.  A free light chain ratio from the serum was normal 1.64.    The patient was referred for a bone marrow exam performed on 3/6/2019 which showed a cellularity of 35%.  There was erythroid hyperplasia with megaloblastoid changes, normal myeloid maturation, increased megakaryocytes with frequent micro megakaryocytes, scattered lymphoid aggregates.  There were morphologically normal plasma cells increased in #2 8% of the cellularity.  There were no increased blasts.  No increase in iron stores.  Karyotype showed a deletion 5 q. and 19 of 20 cells analyzed.        Past Medical History:   Diagnosis Date   • Abnormal urination    • Allergic rhinitis    • Anemia    • Anxiety    • Appetite absent    • Arthritis    • Asthma    • Back pain    • Bell's palsy    • Black tarry stools    • Blood in stool    • CKD (chronic kidney disease) stage 3, GFR 30-59 ml/min (CMS/HCC)    • Cough    • Depression    • Diabetes mellitus (CMS/HCC)     LAST A1C 6   • Diabetic gastroparesis (CMS/HCC) 2/19/2016   • Difficulty walking    • Dizziness 2018   • Excessive urination at night    • Fatigue    • Fever, low grade    • Frequent urination    • GERD (gastroesophageal reflux disease)    • GI bleed    • H/O blood clots     LEFT LEG 7 OR 8 YEARS AGO   • Heat intolerance    • History of fall 10/2018   • History of prior pregnancies     x8, miscarriage 5   • History  of transfusion 11/2018    due to anemia   • Hyperlipidemia    • Hypertension    • Hypothyroidism    • Nausea & vomiting    • Normal coronary arteries     by cath 2013   • AARON (obstructive sleep apnea)     DOESNT WEAR REGULARLY   • PONV (postoperative nausea and vomiting)    • Skin cancer    • Stroke (CMS/HCC)     Several mini-strokes   • Swelling    • TIA (transient ischemic attack)     LAST TIA JULY 2017   • Urination pain    • UTI (urinary tract infection)     Dec 2018 and Jan 2019   • Weight loss         Past Surgical History:   Procedure Laterality Date   • BACK SURGERY      HARDWARE   • CHOLECYSTECTOMY      OPEN   • COLONOSCOPY  2011    due for repeat in 2021   • COLONOSCOPY N/A 10/28/2018    Procedure: COLONOSCOPY;  Surgeon: Emmanuel Rogers MD;  Location: [facility] OR;  Service: Gastroenterology   • ENDOSCOPY N/A 10/26/2018    Procedure: ESOPHAGOGASTRODUODENOSCOPY;  Surgeon: Emmanuel Rogers MD;  Location: [facility] OR;  Service: Gastroenterology   • HYSTERECTOMY      PARTIAL    • NECK SURGERY     • SPINE SURGERY     • TUMOR REMOVAL Left     Leg   • UPPER GASTROINTESTINAL ENDOSCOPY  2014    gastritis.  done by dr. hastings        Current Outpatient Medications on File Prior to Visit   Medication Sig Dispense Refill   • ACCU-CHEK FASTCLIX LANCETS misc TEST 3-4 TIMES DAILY AS DIRECTED 400 each 3   • ACCU-CHEK SMARTVIEW test strip TEST blood sugar three times daily OR AS DIRECTED 300 each 3   • acetaminophen (TYLENOL) 325 MG tablet Take 2 tablets by mouth Every 6 (Six) Hours As Needed for Mild Pain . OTC product 40 tablet 0   • albuterol sulfate  (90 Base) MCG/ACT inhaler Inhale 2 puffs Every 4 (Four) Hours As Needed for Wheezing. 1 inhaler 3   • Alcohol Swabs (B-D SINGLE USE SWABS REGULAR) pads      • atorvastatin (LIPITOR) 10 MG tablet TAKE ONE TABLET BY MOUTH AT BEDTIME 90 tablet 1   • benzonatate (TESSALON) 200 MG capsule Take 1 capsule by mouth 3 (Three) Times a Day As Needed for Cough.  20 capsule 0   • Blood Glucose Monitoring Suppl (ACCU-CHEK KENNETH SMARTVIEW) w/Device kit TEST blood sugar three times daily or as directed 1 kit 0   • cefuroxime (CEFTIN) 250 MG tablet Take 1 tablet by mouth 2 (Two) Times a Day for 10 days. 20 tablet 0   • cyclobenzaprine (FLEXERIL) 10 MG tablet TAKE ONE TABLET BY MOUTH TWICE DAILY as needed for muscle spasms 30 tablet 0   • diphenhydrAMINE (BENADRYL) 25 mg capsule Take 25 mg by mouth Every 6 (Six) Hours As Needed for Itching.     • DULoxetine (CYMBALTA) 30 MG capsule Take 1 capsule by mouth 2 (Two) Times a Day. 60 capsule 5   • fluticasone (FLONASE) 50 MCG/ACT nasal spray 2 sprays into the nostril(s) as directed by provider Daily. 16 g 0   • furosemide (LASIX) 20 MG tablet TAKE ONE (1) TABLET ORALLY (BY MOUTH) ONCE DAILY 90 tablet 1   • gabapentin (NEURONTIN) 400 MG capsule TAKE ONE CAPSULE BY MOUTH EVERY MORNING, ONE AT NOON, AND TWO AT BEDTIME 120 capsule 5   • HYDROcodone-acetaminophen (NORCO) 5-325 MG per tablet Take one po bid    Prn  Moderate pain 60 tablet 0   • insulin aspart (novoLOG FLEXPEN) 100 UNIT/ML solution pen-injector sc pen Inject 54 Units under the skin into the appropriate area as directed Every Morning Before Breakfast. 30 units with breakfast 35 units with lunch 35 units with dinner      • insulin aspart (novoLOG) 100 UNIT/ML injection Inject 15 Units under the skin into the appropriate area as directed Daily With Breakfast. 15 units with breakfast 25 units with lunch 25 units with dinner     • Insulin Degludec (TRESIBA FLEXTOUCH) 200 UNIT/ML solution pen-injector Inject 54 Units under the skin into the appropriate area as directed every night at bedtime. 9 mL 11   • levothyroxine (SYNTHROID, LEVOTHROID) 88 MCG tablet TAKE ONE TABLET BY MOUTH EVERY DAY 90 tablet 1   • midodrine (PROAMATINE) 2.5 MG tablet Take 1 tablet by mouth 3 (Three) Times a Day. 90 tablet 3   • Multiple Vitamins-Minerals (CENTRUM SILVER PO) Take  by mouth Daily.     •  "Needle, Disp, (BD DISP NEEDLES) 30G X 1/2\" misc To be used 3 times daily with Novolog Flexpen. 100 each 5   • nitrofurantoin, macrocrystal-monohydrate, (MACROBID) 100 MG capsule TAKE ONE PO BID   X  7 DAYS 14 capsule 0   • O2 (OXYGEN) Inhale 2 L/min Every Night. Uses 2L  overnight only     • omeprazole (priLOSEC) 40 MG capsule TAKE ONE CAPSULE BY MOUTH EVERY DAY 90 capsule 1   • potassium chloride (K-DUR) 10 MEQ CR tablet TAKE ONE (1) TABLET ORALLY (BY MOUTH) ONCE DAILY 90 tablet 2   • promethazine (PHENERGAN) 12.5 MG tablet 1/2 to 1 tablet every 6 hours as needed for nausea 20 tablet 0     No current facility-administered medications on file prior to visit.         ALLERGIES:    Allergies   Allergen Reactions   • Baclofen Anxiety     Panic attack, nightmares   • Codeine Itching and Rash   • Lisinopril Cough   • Morphine Hives   • Penicillins Rash     Tolerates cephalosporins         Social History     Socioeconomic History   • Marital status:      Spouse name: Not on file   • Number of children: 3   • Years of education: High School   • Highest education level: Not on file   Occupational History     Employer: RETIRED   Tobacco Use   • Smoking status: Never Smoker   • Smokeless tobacco: Never Used   • Tobacco comment: CAFFEINE USE: NONE   Substance and Sexual Activity   • Alcohol use: No   • Drug use: No   • Sexual activity: Defer     Comment: EXERCISE - RARELY        Family History   Problem Relation Age of Onset   • Lupus Mother    • Heart failure Mother 59   • Heart disease Other    • Hypertension Other    • Heart attack Father         Review of Systems   Constitutional: Positive for activity change and fatigue. Negative for appetite change, fever and unexpected weight change.   HENT: Positive for congestion.    Respiratory: Negative for apnea, cough and shortness of breath.    Cardiovascular: Negative.    Gastrointestinal: Negative for abdominal pain, blood in stool, nausea and vomiting.   Genitourinary: " "Negative for frequency and urgency.   Musculoskeletal: Positive for arthralgias, back pain and gait problem. Negative for neck stiffness.   Skin: Negative.    Neurological: Positive for numbness. Negative for dizziness, tremors and speech difficulty.   Hematological: Negative.    Psychiatric/Behavioral: Negative.        Objective     Vitals:    05/22/19 0946   BP: 106/63   Pulse: 90   Resp: 14   Temp: 97.9 °F (36.6 °C)   TempSrc: Oral   SpO2: 95%   Weight: 91.6 kg (202 lb)   Height: 157.5 cm (62.01\")   PainSc:   8   PainLoc: Shoulder     Current Status 5/22/2019   ECOG score 1       Physical Exam    CON: pleasant well-developed somewhat chronic ill appearing woman  HEENT: no icterus, no thrush, moist membranes  NECK: no jvd  LYMPH: no cervical or supraclavicular lad  CV: RRR, S1S2, no murmur  RESP: cta bilat, no wheezing, no rales  GI: soft, non-tender, no splenomegaly, +bs, obese  MUSC: no edema, ambulates with cane  NEURO: alert and oriented x3, normal strength  PSYCH: normal mood  Exam unchanged- 5/22/2019    RECENT LABS:  Hematology WBC   Date Value Ref Range Status   05/22/2019 7.58 3.40 - 10.80 10*3/mm3 Final   04/23/2019 5.14 3.40 - 10.80 10*3/mm3 Final     RBC   Date Value Ref Range Status   05/22/2019 2.91 (L) 3.77 - 5.28 10*6/mm3 Final   04/23/2019 3.02 (L) 3.77 - 5.28 10*6/mm3 Final     Hemoglobin   Date Value Ref Range Status   05/22/2019 10.3 (L) 12.0 - 15.9 g/dL Final     Hematocrit   Date Value Ref Range Status   05/22/2019 30.4 (L) 34.0 - 46.6 % Final     Platelets   Date Value Ref Range Status   05/22/2019 358 140 - 450 10*3/mm3 Final      SPEP 5/16/19 No m-spike  RANJANA:  IgA lambda protein, IgA 509  FLC ratio 1.6    Assessment/Plan     1.  Macrocytic anemia:  BM biopsy 3/5/19 c/w myelodysplastic syndrome with deletion of 5 q.  In addition, the patient has chronic kidney disease, stage III.  She is currently doing okay on weekly Procrit with minimal transfusion support.  We will continue this plan for " now.  Revlimid would be an option down the road if her transfusion requirement increases.  I will reassess her iron studies in 2 months.    2.  Monoclonal gammopathy of undetermined significance: The patient's bone marrow showed slight increase in plasma cells 8% of the cellularity but normal in morphology.  She has an IgA monoclonality on her immunofixation but no measurable M spike and a normal free light chain ratio.   Repeat studies 5/16/2019 showed a stable IgA 509 with a normal light chain ratio, no M spike.  Plan to repeat study 6 months.    3.  Chronic kidney disease, stage III which contributes to anemia

## 2019-05-29 RX ORDER — DULOXETIN HYDROCHLORIDE 30 MG/1
CAPSULE, DELAYED RELEASE ORAL
Qty: 60 CAPSULE | Refills: 5 | Status: SHIPPED | OUTPATIENT
Start: 2019-05-29 | End: 2019-09-03

## 2019-05-30 ENCOUNTER — LAB (OUTPATIENT)
Dept: LAB | Facility: HOSPITAL | Age: 79
End: 2019-05-30

## 2019-05-30 ENCOUNTER — INFUSION (OUTPATIENT)
Dept: ONCOLOGY | Facility: HOSPITAL | Age: 79
End: 2019-05-30

## 2019-05-30 VITALS — HEART RATE: 92 BPM | DIASTOLIC BLOOD PRESSURE: 52 MMHG | TEMPERATURE: 98.5 F | SYSTOLIC BLOOD PRESSURE: 112 MMHG

## 2019-05-30 DIAGNOSIS — E11.22 CKD STAGE 3 DUE TO TYPE 2 DIABETES MELLITUS (HCC): ICD-10-CM

## 2019-05-30 DIAGNOSIS — N18.30 CKD STAGE 3 DUE TO TYPE 2 DIABETES MELLITUS (HCC): ICD-10-CM

## 2019-05-30 DIAGNOSIS — D64.9 ANEMIA REQUIRING TRANSFUSIONS: Primary | ICD-10-CM

## 2019-05-30 DIAGNOSIS — D46.C MYELODYSPLASTIC SYNDROME WITH 5Q DELETION (HCC): ICD-10-CM

## 2019-05-30 LAB
BASOPHILS # BLD AUTO: 0.13 10*3/MM3 (ref 0–0.2)
BASOPHILS NFR BLD AUTO: 1.6 % (ref 0–1.5)
DEPRECATED RDW RBC AUTO: 80.4 FL (ref 37–54)
EOSINOPHIL # BLD AUTO: 0.42 10*3/MM3 (ref 0–0.4)
EOSINOPHIL NFR BLD AUTO: 5.2 % (ref 0.3–6.2)
ERYTHROCYTE [DISTWIDTH] IN BLOOD BY AUTOMATED COUNT: 21.8 % (ref 12.3–15.4)
HCT VFR BLD AUTO: 27.2 % (ref 34–46.6)
HGB BLD-MCNC: 9.4 G/DL (ref 12–15.9)
IMM GRANULOCYTES # BLD AUTO: 0.19 10*3/MM3 (ref 0–0.05)
IMM GRANULOCYTES NFR BLD AUTO: 2.4 % (ref 0–0.5)
LYMPHOCYTES # BLD AUTO: 2.7 10*3/MM3 (ref 0.7–3.1)
LYMPHOCYTES NFR BLD AUTO: 33.5 % (ref 19.6–45.3)
MCH RBC QN AUTO: 35.7 PG (ref 26.6–33)
MCHC RBC AUTO-ENTMCNC: 34.6 G/DL (ref 31.5–35.7)
MCV RBC AUTO: 103.4 FL (ref 79–97)
MONOCYTES # BLD AUTO: 0.41 10*3/MM3 (ref 0.1–0.9)
MONOCYTES NFR BLD AUTO: 5.1 % (ref 5–12)
NEUTROPHILS # BLD AUTO: 4.22 10*3/MM3 (ref 1.7–7)
NEUTROPHILS NFR BLD AUTO: 52.2 % (ref 42.7–76)
NRBC BLD AUTO-RTO: 0.5 /100 WBC (ref 0–0.2)
PLATELET # BLD AUTO: 409 10*3/MM3 (ref 140–450)
PMV BLD AUTO: 11.3 FL (ref 6–12)
RBC # BLD AUTO: 2.63 10*6/MM3 (ref 3.77–5.28)
WBC NRBC COR # BLD: 8.07 10*3/MM3 (ref 3.4–10.8)

## 2019-05-30 PROCEDURE — 96372 THER/PROPH/DIAG INJ SC/IM: CPT | Performed by: NURSE PRACTITIONER

## 2019-05-30 PROCEDURE — 36415 COLL VENOUS BLD VENIPUNCTURE: CPT

## 2019-05-30 PROCEDURE — 25010000002 EPOETIN ALFA PER 1000 UNITS: Performed by: NURSE PRACTITIONER

## 2019-05-30 PROCEDURE — 85025 COMPLETE CBC W/AUTO DIFF WBC: CPT | Performed by: INTERNAL MEDICINE

## 2019-05-30 RX ADMIN — ERYTHROPOIETIN 40000 UNITS: 40000 INJECTION, SOLUTION INTRAVENOUS; SUBCUTANEOUS at 10:03

## 2019-06-06 ENCOUNTER — APPOINTMENT (OUTPATIENT)
Dept: LAB | Facility: HOSPITAL | Age: 79
End: 2019-06-06

## 2019-06-06 ENCOUNTER — INFUSION (OUTPATIENT)
Dept: ONCOLOGY | Facility: HOSPITAL | Age: 79
End: 2019-06-06

## 2019-06-06 VITALS
TEMPERATURE: 98.2 F | DIASTOLIC BLOOD PRESSURE: 63 MMHG | SYSTOLIC BLOOD PRESSURE: 97 MMHG | OXYGEN SATURATION: 90 % | HEART RATE: 80 BPM

## 2019-06-06 DIAGNOSIS — D46.C MYELODYSPLASTIC SYNDROME WITH 5Q DELETION (HCC): Primary | ICD-10-CM

## 2019-06-06 DIAGNOSIS — N18.30 CKD STAGE 3 DUE TO TYPE 2 DIABETES MELLITUS (HCC): ICD-10-CM

## 2019-06-06 DIAGNOSIS — D64.9 ANEMIA REQUIRING TRANSFUSIONS: ICD-10-CM

## 2019-06-06 DIAGNOSIS — E11.22 CKD STAGE 3 DUE TO TYPE 2 DIABETES MELLITUS (HCC): ICD-10-CM

## 2019-06-06 LAB
BASOPHILS # BLD AUTO: 0.09 10*3/MM3 (ref 0–0.2)
BASOPHILS NFR BLD AUTO: 1.4 % (ref 0–1.5)
DEPRECATED RDW RBC AUTO: 87.9 FL (ref 37–54)
EOSINOPHIL # BLD AUTO: 0.34 10*3/MM3 (ref 0–0.4)
EOSINOPHIL NFR BLD AUTO: 5.3 % (ref 0.3–6.2)
ERYTHROCYTE [DISTWIDTH] IN BLOOD BY AUTOMATED COUNT: 23 % (ref 12.3–15.4)
HCT VFR BLD AUTO: 26.2 % (ref 34–46.6)
HGB BLD-MCNC: 8.7 G/DL (ref 12–15.9)
IMM GRANULOCYTES # BLD AUTO: 0.07 10*3/MM3 (ref 0–0.05)
IMM GRANULOCYTES NFR BLD AUTO: 1.1 % (ref 0–0.5)
LYMPHOCYTES # BLD AUTO: 2.24 10*3/MM3 (ref 0.7–3.1)
LYMPHOCYTES NFR BLD AUTO: 34.9 % (ref 19.6–45.3)
MCH RBC QN AUTO: 35.8 PG (ref 26.6–33)
MCHC RBC AUTO-ENTMCNC: 33.2 G/DL (ref 31.5–35.7)
MCV RBC AUTO: 107.8 FL (ref 79–97)
MONOCYTES # BLD AUTO: 0.32 10*3/MM3 (ref 0.1–0.9)
MONOCYTES NFR BLD AUTO: 5 % (ref 5–12)
NEUTROPHILS # BLD AUTO: 3.35 10*3/MM3 (ref 1.7–7)
NEUTROPHILS NFR BLD AUTO: 52.3 % (ref 42.7–76)
NRBC BLD AUTO-RTO: 0 /100 WBC (ref 0–0.2)
PLATELET # BLD AUTO: 432 10*3/MM3 (ref 140–450)
PMV BLD AUTO: 11.3 FL (ref 6–12)
RBC # BLD AUTO: 2.43 10*6/MM3 (ref 3.77–5.28)
WBC NRBC COR # BLD: 6.41 10*3/MM3 (ref 3.4–10.8)

## 2019-06-06 PROCEDURE — 25010000002 EPOETIN ALFA PER 1000 UNITS: Performed by: NURSE PRACTITIONER

## 2019-06-06 PROCEDURE — 36415 COLL VENOUS BLD VENIPUNCTURE: CPT | Performed by: NURSE PRACTITIONER

## 2019-06-06 PROCEDURE — 96372 THER/PROPH/DIAG INJ SC/IM: CPT | Performed by: NURSE PRACTITIONER

## 2019-06-06 PROCEDURE — 85025 COMPLETE CBC W/AUTO DIFF WBC: CPT | Performed by: INTERNAL MEDICINE

## 2019-06-06 RX ORDER — ACETAMINOPHEN 500 MG
1 TABLET ORAL DAILY
COMMUNITY
End: 2020-01-29

## 2019-06-06 RX ADMIN — ERYTHROPOIETIN 40000 UNITS: 40000 INJECTION, SOLUTION INTRAVENOUS; SUBCUTANEOUS at 10:06

## 2019-06-13 ENCOUNTER — INFUSION (OUTPATIENT)
Dept: ONCOLOGY | Facility: HOSPITAL | Age: 79
End: 2019-06-13

## 2019-06-13 ENCOUNTER — LAB (OUTPATIENT)
Dept: LAB | Facility: HOSPITAL | Age: 79
End: 2019-06-13

## 2019-06-13 VITALS
TEMPERATURE: 98 F | DIASTOLIC BLOOD PRESSURE: 83 MMHG | HEART RATE: 84 BPM | SYSTOLIC BLOOD PRESSURE: 133 MMHG | OXYGEN SATURATION: 92 %

## 2019-06-13 DIAGNOSIS — N18.30 CKD STAGE 3 DUE TO TYPE 2 DIABETES MELLITUS (HCC): ICD-10-CM

## 2019-06-13 DIAGNOSIS — E11.22 CKD STAGE 3 DUE TO TYPE 2 DIABETES MELLITUS (HCC): ICD-10-CM

## 2019-06-13 DIAGNOSIS — D46.C MYELODYSPLASTIC SYNDROME WITH 5Q DELETION (HCC): ICD-10-CM

## 2019-06-13 DIAGNOSIS — D64.9 ANEMIA REQUIRING TRANSFUSIONS: Primary | ICD-10-CM

## 2019-06-13 LAB
BASOPHILS # BLD AUTO: 0.12 10*3/MM3 (ref 0–0.2)
BASOPHILS NFR BLD AUTO: 2.6 % (ref 0–1.5)
DEPRECATED RDW RBC AUTO: 83.2 FL (ref 37–54)
EOSINOPHIL # BLD AUTO: 0.27 10*3/MM3 (ref 0–0.4)
EOSINOPHIL NFR BLD AUTO: 5.9 % (ref 0.3–6.2)
ERYTHROCYTE [DISTWIDTH] IN BLOOD BY AUTOMATED COUNT: 21.9 % (ref 12.3–15.4)
HCT VFR BLD AUTO: 24.9 % (ref 34–46.6)
HGB BLD-MCNC: 8.4 G/DL (ref 12–15.9)
IMM GRANULOCYTES # BLD AUTO: 0.06 10*3/MM3 (ref 0–0.05)
IMM GRANULOCYTES NFR BLD AUTO: 1.3 % (ref 0–0.5)
LYMPHOCYTES # BLD AUTO: 1.82 10*3/MM3 (ref 0.7–3.1)
LYMPHOCYTES NFR BLD AUTO: 39.7 % (ref 19.6–45.3)
MCH RBC QN AUTO: 35.4 PG (ref 26.6–33)
MCHC RBC AUTO-ENTMCNC: 33.7 G/DL (ref 31.5–35.7)
MCV RBC AUTO: 105.1 FL (ref 79–97)
MONOCYTES # BLD AUTO: 0.18 10*3/MM3 (ref 0.1–0.9)
MONOCYTES NFR BLD AUTO: 3.9 % (ref 5–12)
NEUTROPHILS # BLD AUTO: 2.14 10*3/MM3 (ref 1.7–7)
NEUTROPHILS NFR BLD AUTO: 46.6 % (ref 42.7–76)
PLATELET # BLD AUTO: 283 10*3/MM3 (ref 140–450)
PMV BLD AUTO: 12 FL (ref 6–12)
RBC # BLD AUTO: 2.37 10*6/MM3 (ref 3.77–5.28)
WBC NRBC COR # BLD: 4.59 10*3/MM3 (ref 3.4–10.8)

## 2019-06-13 PROCEDURE — 96372 THER/PROPH/DIAG INJ SC/IM: CPT | Performed by: NURSE PRACTITIONER

## 2019-06-13 PROCEDURE — 36415 COLL VENOUS BLD VENIPUNCTURE: CPT

## 2019-06-13 PROCEDURE — 85025 COMPLETE CBC W/AUTO DIFF WBC: CPT | Performed by: INTERNAL MEDICINE

## 2019-06-13 PROCEDURE — 25010000002 EPOETIN ALFA PER 1000 UNITS: Performed by: NURSE PRACTITIONER

## 2019-06-13 RX ADMIN — ERYTHROPOIETIN 50000 UNITS: 20000 INJECTION, SOLUTION INTRAVENOUS; SUBCUTANEOUS at 10:06

## 2019-06-18 ENCOUNTER — OFFICE VISIT (OUTPATIENT)
Dept: INTERNAL MEDICINE | Facility: CLINIC | Age: 79
End: 2019-06-18

## 2019-06-18 ENCOUNTER — OFFICE VISIT (OUTPATIENT)
Dept: ORTHOPEDIC SURGERY | Facility: CLINIC | Age: 79
End: 2019-06-18

## 2019-06-18 VITALS
BODY MASS INDEX: 36.8 KG/M2 | HEIGHT: 62 IN | WEIGHT: 200 LBS | HEART RATE: 87 BPM | DIASTOLIC BLOOD PRESSURE: 66 MMHG | SYSTOLIC BLOOD PRESSURE: 100 MMHG

## 2019-06-18 VITALS
TEMPERATURE: 97.7 F | RESPIRATION RATE: 18 BRPM | BODY MASS INDEX: 36.44 KG/M2 | DIASTOLIC BLOOD PRESSURE: 72 MMHG | WEIGHT: 198 LBS | SYSTOLIC BLOOD PRESSURE: 130 MMHG | HEIGHT: 62 IN | OXYGEN SATURATION: 91 % | HEART RATE: 89 BPM

## 2019-06-18 DIAGNOSIS — E11.22 CKD STAGE 3 DUE TO TYPE 2 DIABETES MELLITUS (HCC): ICD-10-CM

## 2019-06-18 DIAGNOSIS — M19.012 PRIMARY LOCALIZED OSTEOARTHROSIS OF LEFT SHOULDER REGION: Primary | ICD-10-CM

## 2019-06-18 DIAGNOSIS — F41.9 ANXIETY: ICD-10-CM

## 2019-06-18 DIAGNOSIS — K21.9 GASTROESOPHAGEAL REFLUX DISEASE, ESOPHAGITIS PRESENCE NOT SPECIFIED: ICD-10-CM

## 2019-06-18 DIAGNOSIS — N18.30 CKD STAGE 3 DUE TO TYPE 2 DIABETES MELLITUS (HCC): ICD-10-CM

## 2019-06-18 DIAGNOSIS — E11.40 TYPE 2 DIABETES MELLITUS WITH DIABETIC NEUROPATHY, WITH LONG-TERM CURRENT USE OF INSULIN (HCC): ICD-10-CM

## 2019-06-18 DIAGNOSIS — D46.C MYELODYSPLASTIC SYNDROME WITH 5Q DELETION (HCC): ICD-10-CM

## 2019-06-18 DIAGNOSIS — E78.2 MIXED HYPERLIPIDEMIA: Primary | ICD-10-CM

## 2019-06-18 DIAGNOSIS — Z79.4 TYPE 2 DIABETES MELLITUS WITH DIABETIC NEUROPATHY, WITH LONG-TERM CURRENT USE OF INSULIN (HCC): ICD-10-CM

## 2019-06-18 DIAGNOSIS — G47.33 OBSTRUCTIVE SLEEP APNEA SYNDROME: ICD-10-CM

## 2019-06-18 DIAGNOSIS — I10 ESSENTIAL HYPERTENSION: ICD-10-CM

## 2019-06-18 PROCEDURE — 73030 X-RAY EXAM OF SHOULDER: CPT | Performed by: ORTHOPAEDIC SURGERY

## 2019-06-18 PROCEDURE — 99213 OFFICE O/P EST LOW 20 MIN: CPT | Performed by: ORTHOPAEDIC SURGERY

## 2019-06-18 PROCEDURE — 99214 OFFICE O/P EST MOD 30 MIN: CPT | Performed by: INTERNAL MEDICINE

## 2019-06-18 PROCEDURE — 20610 DRAIN/INJ JOINT/BURSA W/O US: CPT | Performed by: ORTHOPAEDIC SURGERY

## 2019-06-18 RX ORDER — NYSTATIN 100000 [USP'U]/G
POWDER TOPICAL
Qty: 60 G | Refills: 2 | Status: SHIPPED | OUTPATIENT
Start: 2019-06-18 | End: 2020-01-14

## 2019-06-18 RX ADMIN — LIDOCAINE HYDROCHLORIDE 4 ML: 10 INJECTION, SOLUTION EPIDURAL; INFILTRATION; INTRACAUDAL; PERINEURAL at 11:44

## 2019-06-18 RX ADMIN — TRIAMCINOLONE ACETONIDE 80 MG: 40 INJECTION, SUSPENSION INTRA-ARTICULAR; INTRAMUSCULAR at 11:44

## 2019-06-18 NOTE — PROGRESS NOTES
Subjective:     Patient ID: Joya Singh is a 79 y.o. female.    Chief Complaint:   Follow-up left shoulder pain, DJD  History of Present Illness  Joya Singh presents to clinic today for evaluation of left shoulder pain. The pain has been ongoing for the past 4-6 months. She denies any past injury to the left shoulder. She received a cortisone injection about 4 months ago that did not provide any relief. She currently rates the left shoulder pain as a 10/10 in severity and describes the pain as aching in nature. Her pain is improved with using a heating pad, but is worsened with any motion of the shoulder. She notes some intermittent tingling that radiates down to her hand. She also complains of intermittent radiation of her left shoulder pain that extends up to her neck and down into her left hand.  No meeting and he takes me again and has been up with  Social History     Occupational History     Employer: RETIRED   Tobacco Use   • Smoking status: Never Smoker   • Smokeless tobacco: Never Used   • Tobacco comment: CAFFEINE USE: NONE   Substance and Sexual Activity   • Alcohol use: No   • Drug use: No   • Sexual activity: Defer     Comment: EXERCISE - RARELY      Past Medical History:   Diagnosis Date   • Allergic rhinitis    • Anemia    • Anxiety    • Appetite absent    • Arthritis    • Asthma    • Back pain    • Bell's palsy    • Black tarry stools    • Blood in stool    • Chronic fatigue    • CKD (chronic kidney disease) stage 3, GFR 30-59 ml/min (CMS/[facility])    • Community acquired pneumonia of left lung 10/25/2018   • Cough    • Depression    • Diabetes mellitus (CMS/[facility])     LAST A1C 6   • Diabetic gastroparesis (CMS/[facility]) 2/19/2016   • Difficulty walking    • Excessive urination at night    • Frequent urination    • GERD (gastroesophageal reflux disease)    • GI bleed    • Gout    • H/O blood clots     LEFT LEG 7 OR 8 YEARS AGO   • Heat intolerance    • History of fall 10/2018   • History of prior pregnancies     x8,  miscarriage 5   • History of transfusion 11/2018    due to anemia   • Hyperlipidemia    • Hypertension    • Hypothyroidism    • Normal coronary arteries     by cath 2013   • Orthostatic hypotension    • AARON (obstructive sleep apnea)     DOESNT WEAR REGULARLY   • PONV (postoperative nausea and vomiting)    • Skin cancer    • Stroke (CMS/HCC)     Several mini-strokes   • TIA (transient ischemic attack)     LAST TIA JULY 2017   • Urination pain    • UTI (urinary tract infection)     Dec 2018 and Jan 2019     Past Surgical History:   Procedure Laterality Date   • BACK SURGERY      HARDWARE   • CHOLECYSTECTOMY      OPEN   • COLONOSCOPY  2011    due for repeat in 2021   • COLONOSCOPY N/A 10/28/2018    Procedure: COLONOSCOPY;  Surgeon: Emmanuel Rogers MD;  Location: [facility] OR;  Service: Gastroenterology   • ENDOSCOPY N/A 10/26/2018    Procedure: ESOPHAGOGASTRODUODENOSCOPY;  Surgeon: Emmanuel Rogers MD;  Location: [facility] OR;  Service: Gastroenterology   • HYSTERECTOMY      PARTIAL    • NECK SURGERY     • SPINE SURGERY     • TUMOR REMOVAL Left     Leg   • UPPER GASTROINTESTINAL ENDOSCOPY  2014    gastritis.  done by dr. hastings       Family History   Problem Relation Age of Onset   • Lupus Mother    • Heart failure Mother 59   • Heart disease Other    • Hypertension Other    • Heart attack Father          Review of Systems   Constitutional: Negative for chills, diaphoresis, fever and unexpected weight change.   HENT: Negative for hearing loss, nosebleeds, sore throat and tinnitus.    Eyes: Negative for pain and visual disturbance.   Respiratory: Negative for cough, shortness of breath and wheezing.    Cardiovascular: Negative for chest pain and palpitations.   Gastrointestinal: Negative for abdominal pain, diarrhea, nausea and vomiting.   Endocrine: Negative for cold intolerance, heat intolerance and polydipsia.   Genitourinary: Negative for difficulty urinating, dysuria and hematuria.  "  Musculoskeletal: Positive for arthralgias and myalgias. Negative for joint swelling.   Skin: Negative for rash and wound.   Allergic/Immunologic: Negative for environmental allergies.   Neurological: Negative for dizziness, syncope and numbness.   Hematological: Does not bruise/bleed easily.   Psychiatric/Behavioral: Negative for dysphoric mood and sleep disturbance. The patient is not nervous/anxious.            Objective:  Vitals:    06/18/19 1049   BP: 100/66   BP Location: Left arm   Pulse: 87   Weight: 90.7 kg (200 lb)   Height: 157.5 cm (62\")         06/18/19  1049   Weight: 90.7 kg (200 lb)     Body mass index is 36.58 kg/m².    Physical Exam    Vital signs reviewed.   General: No acute distress, alert and oriented  Eyes: conjunctiva clear; pupils equally round and reactive  ENT: external ears and nose atraumatic; oropharynx clear  CV: no peripheral edema  Resp: normal respiratory effort  Skin: no rashes or wounds; normal turgor  Psych: mood and affect appropriate; recent and remote memory intact        Ortho Exam     Left Shoulder-   Active FF to 40, Passive FF to 100  Active ER to 20, Passive ER to 45  3/5 on all planes of motion  Internal rotation to the side  4/5 strength on belly press  Maximal pain and crepitus on the joint line  Mild tenderness AC joint    Imaging:  Left Shoulder X-Ray  Indication: Pain  AP, scapular Y, and axillary lateral views    Findings:  No fracture  Normal soft tissues  Advanced glenohumeral arthritis with medial calcar osteophyte proximal humerus with subchondral cyst proximal humerus and glenoid.    No prior studies were available for comparison.    Assessment:        1. Primary localized osteoarthrosis of left shoulder region           Plan:        Large Joint Arthrocentesis: L glenohumeral  Date/Time: 6/18/2019 11:44 AM  Consent given by: patient  Site marked: site marked  Timeout: Immediately prior to procedure a time out was called to verify the correct patient, " procedure, equipment, support staff and site/side marked as required   Supporting Documentation  Indications: pain   Procedure Details  Location: shoulder - L glenohumeral  Preparation: Patient was prepped and draped in the usual sterile fashion  Needle size: 22 G  Approach: anterior  Medications administered: 4 mL lidocaine PF 1% 1 %; 80 mg triamcinolone acetonide 40 MG/ML  Patient tolerance: patient tolerated the procedure well with no immediate complications          1. Discussed treatment options at length with patient at today's visit.   2. Medical co-morbidities prevent surgical intervention at this time.  3. Will proceed with anterior glenohumeral left shoulder cortisone injection today. If the patient does not experience any improvement of her pain we will consider ultrasound guided cortisone injection with Sports Medicine service at  [facility].  4. Will follow-up with me as needed.      Joya Singh was in agreement with plan and had all questions answered.     Orders:  Orders Placed This Encounter   Procedures   • Large Joint Arthrocentesis: L glenohumeral   • XR Shoulder 2+ View Left       Medications:  No orders of the defined types were placed in this encounter.      Followup:  Return if symptoms worsen or fail to improve.    Joya was seen today for pain.    Diagnoses and all orders for this visit:    Primary localized osteoarthrosis of left shoulder region  -     XR Shoulder 2+ View Left  -     Large Joint Arthrocentesis: L glenohumeral    By signing my name here, I Gely Castillo, attest that all documentation on 06/29/19 at 4:13 PM has been prepared under the direction and in the presence of Dr. German Wylie.    I, Dr. German Wylie, personally performed the services described in this documentation, as scribed by Gely Castillo, in my presence, and it is both accurate and complete.        Dictated utilizing Dragon dictation

## 2019-06-18 NOTE — PROGRESS NOTES
Joya Singh is a 79 y.o. female, who presents with a chief complaint of   Chief Complaint   Patient presents with   • Hyperlipidemia   • Hypertension   • Follow-up   • Cough   • medication refills       HPI   Pt here for follow up.  She recently went to [place] and had a great time.  She didn't need her wheelchair but did take a walker with a seat.       Myelodysplastic syndrome - pt on increase dose of procrit (50,000 units q week) and hgb around 8.  She follows with dr. Headley.  Pt's last transfusion about 2 months ago.   Goal to keep hgb >8.     She has hypertension complicated by orthostatic hypotension. Currently doing ok with midodrine.        Diabetes mellitus - Pt's glucoses are better over all but still often high.  This am glucose over 200.  She is trying to eat better but still craves snacks.  I will not check an A1c bc she had a PRBC transfusion within the past 8 weeks.  Overall she has had 20 units of PRBC's in the past 6 months.  Her weight has been stable.    She is on tresiba and novolog.  She denies hypoglycemia.      HLD - on statin.  No cramps or myalgias.      Hypothyroidism - on synthroid and doing well.  No hair/skin changes.     Hypoxia - pt sleeping in her recliner at night.  She can't lay down flat to sleep bc of soa.  She has oxygen she is supposed to be wearing at night but isnt currently using.  I encouraged her to wear her oxygen every night bc of her anemia.      Pt having more issues with her diabetic neuropathy - both of her feet hurt.  The right lateral foot is the worst spot.  She usually wears tennis shoes.    The following portions of the patient's history were reviewed and updated as appropriate: allergies, current medications, past family history, past medical history, past social history, past surgical history and problem list.    Allergies: Baclofen; Codeine; Lisinopril; Morphine; and Penicillins    Review of Systems   Constitutional: Positive for fatigue.   HENT:  Negative.    Eyes: Negative.    Respiratory: Negative.    Cardiovascular: Negative.    Gastrointestinal: Negative.    Endocrine: Negative.    Genitourinary: Negative.    Musculoskeletal: Negative.    Skin: Negative.    Allergic/Immunologic: Negative.    Neurological: Negative.    Hematological: Negative.    Psychiatric/Behavioral: Negative.    All other systems reviewed and are negative.            Wt Readings from Last 3 Encounters:   06/18/19 89.8 kg (198 lb)   06/18/19 90.7 kg (200 lb)   05/22/19 91.6 kg (202 lb)     Temp Readings from Last 3 Encounters:   06/18/19 97.7 °F (36.5 °C) (Oral)   06/13/19 98 °F (36.7 °C)   06/06/19 98.2 °F (36.8 °C)     BP Readings from Last 3 Encounters:   06/18/19 130/72   06/18/19 100/66   06/13/19 133/83     Pulse Readings from Last 3 Encounters:   06/18/19 89   06/18/19 87   06/13/19 84     Body mass index is 36.21 kg/m².  @LASTSAO2(3)@    Physical Exam   Constitutional: She is oriented to person, place, and time. She appears well-developed and well-nourished. No distress.   HENT:   Head: Normocephalic and atraumatic.   Right Ear: External ear normal.   Left Ear: External ear normal.   Nose: Nose normal.   Mouth/Throat: Oropharynx is clear and moist.   Eyes: Conjunctivae and EOM are normal. Pupils are equal, round, and reactive to light.   Neck: Normal range of motion. Neck supple.   Cardiovascular: Normal rate, regular rhythm, normal heart sounds and intact distal pulses.   Pulmonary/Chest: Effort normal and breath sounds normal. No respiratory distress. She has no wheezes.   Musculoskeletal: Normal range of motion.   Normal gait   Neurological: She is alert and oriented to person, place, and time.   Skin: Skin is warm and dry.   Psychiatric: She has a normal mood and affect. Her behavior is normal. Judgment and thought content normal.   Nursing note and vitals reviewed.      Results for orders placed or performed in visit on 06/13/19   CBC Auto Differential   Result Value Ref  Range    WBC 4.59 3.40 - 10.80 10*3/mm3    RBC 2.37 (L) 3.77 - 5.28 10*6/mm3    Hemoglobin 8.4 (L) 12.0 - 15.9 g/dL    Hematocrit 24.9 (L) 34.0 - 46.6 %    .1 (H) 79.0 - 97.0 fL    MCH 35.4 (H) 26.6 - 33.0 pg    MCHC 33.7 31.5 - 35.7 g/dL    RDW 21.9 (H) 12.3 - 15.4 %    RDW-SD 83.2 (H) 37.0 - 54.0 fl    MPV 12.0 6.0 - 12.0 fL    Platelets 283 140 - 450 10*3/mm3    Neutrophil % 46.6 42.7 - 76.0 %    Lymphocyte % 39.7 19.6 - 45.3 %    Monocyte % 3.9 (L) 5.0 - 12.0 %    Eosinophil % 5.9 0.3 - 6.2 %    Basophil % 2.6 (H) 0.0 - 1.5 %    Immature Grans % 1.3 (H) 0.0 - 0.5 %    Neutrophils, Absolute 2.14 1.70 - 7.00 10*3/mm3    Lymphocytes, Absolute 1.82 0.70 - 3.10 10*3/mm3    Monocytes, Absolute 0.18 0.10 - 0.90 10*3/mm3    Eosinophils, Absolute 0.27 0.00 - 0.40 10*3/mm3    Basophils, Absolute 0.12 0.00 - 0.20 10*3/mm3    Immature Grans, Absolute 0.06 (H) 0.00 - 0.05 10*3/mm3           Joya was seen today for hyperlipidemia, hypertension, follow-up, cough and medication refills.    Diagnoses and all orders for this visit:    Mixed hyperlipidemia    Essential hypertension    Gastroesophageal reflux disease, esophagitis presence not specified    Type 2 diabetes mellitus with diabetic neuropathy, with long-term current use of insulin (CMS/[facility])    CKD stage 3 due to type 2 diabetes mellitus (CMS/[facility])    Myelodysplastic syndrome with 5q deletion (CMS/[facility])    Anxiety    Obstructive sleep apnea syndrome      Encouraged pt to wear her oxygen nightly.    Cont current meds.  Encouraged pt to monitor glucoses very closely.  Will check a1c next month if pt doesn't have another blood transfusion.  Encouraged healthy diet/exercise.      Outpatient Medications Prior to Visit   Medication Sig Dispense Refill   • ACCU-CHEK FASTCLIX LANCETS misc TEST 3-4 TIMES DAILY AS DIRECTED 400 each 3   • ACCU-CHEK SMARTVIEW test strip TEST BLOOD SUGAR THREE TIMES DAILY OR AS DIRECTED 300 each 3   • acetaminophen (TYLENOL) 325 MG tablet Take 2  tablets by mouth Every 6 (Six) Hours As Needed for Mild Pain . OTC product 40 tablet 0   • albuterol sulfate  (90 Base) MCG/ACT inhaler Inhale 2 puffs Every 4 (Four) Hours As Needed for Wheezing. 1 inhaler 3   • Alcohol Swabs (B-D SINGLE USE SWABS REGULAR) pads      • atorvastatin (LIPITOR) 10 MG tablet TAKE ONE TABLET BY MOUTH AT BEDTIME 90 tablet 1   • Blood Glucose Monitoring Suppl (ACCU-CHEK KENNETH Hobzy) w/Device kit TEST blood sugar three times daily or as directed 1 kit 0   • Calcium Carbonate-Vit D-Min (CALCIUM 1200) 8387-6654 MG-UNIT chewable tablet Chew 1 tablet Daily.     • cyclobenzaprine (FLEXERIL) 10 MG tablet TAKE ONE TABLET BY MOUTH TWICE DAILY as needed for muscle spasms 30 tablet 0   • diphenhydrAMINE (BENADRYL) 25 mg capsule Take 25 mg by mouth Every 6 (Six) Hours As Needed for Itching.     • DULoxetine (CYMBALTA) 30 MG capsule TAKE ONE CAPSULE BY MOUTH TWICE DAILY 60 capsule 5   • fluticasone (FLONASE) 50 MCG/ACT nasal spray 2 sprays into the nostril(s) as directed by provider Daily. 16 g 0   • furosemide (LASIX) 20 MG tablet TAKE ONE (1) TABLET ORALLY (BY MOUTH) ONCE DAILY 90 tablet 1   • gabapentin (NEURONTIN) 400 MG capsule TAKE ONE CAPSULE BY MOUTH EVERY MORNING, ONE AT NOON, AND TWO AT BEDTIME 120 capsule 5   • HYDROcodone-acetaminophen (NORCO) 5-325 MG per tablet Take one po bid    Prn  Moderate pain 60 tablet 0   • insulin aspart (novoLOG FLEXPEN) 100 UNIT/ML solution pen-injector sc pen Inject 54 Units under the skin into the appropriate area as directed Every Morning Before Breakfast. 30 units with breakfast 35 units with lunch 35 units with dinner      • insulin aspart (novoLOG) 100 UNIT/ML injection Inject 15 Units under the skin into the appropriate area as directed Daily With Breakfast. 15 units with breakfast 25 units with lunch 25 units with dinner     • Insulin Degludec (TRESIBA FLEXTOUCH) 200 UNIT/ML solution pen-injector Inject 54 Units under the skin into the  "appropriate area as directed every night at bedtime. 9 mL 11   • levothyroxine (SYNTHROID, LEVOTHROID) 88 MCG tablet TAKE ONE TABLET BY MOUTH EVERY DAY 90 tablet 1   • midodrine (PROAMATINE) 2.5 MG tablet Take 1 tablet by mouth 3 (Three) Times a Day. 90 tablet 3   • Multiple Vitamins-Minerals (CENTRUM SILVER PO) Take  by mouth Daily.     • Needle, Disp, (BD DISP NEEDLES) 30G X 1/2\" misc To be used 3 times daily with Novolog Flexpen. 100 each 5   • O2 (OXYGEN) Inhale 2 L/min Every Night. Uses 2L  overnight only     • omeprazole (priLOSEC) 40 MG capsule TAKE ONE CAPSULE BY MOUTH EVERY DAY 90 capsule 1   • potassium chloride (K-DUR) 10 MEQ CR tablet TAKE ONE (1) TABLET ORALLY (BY MOUTH) ONCE DAILY 90 tablet 2   • promethazine (PHENERGAN) 12.5 MG tablet 1/2 to 1 tablet every 6 hours as needed for nausea 20 tablet 0   • nitrofurantoin, macrocrystal-monohydrate, (MACROBID) 100 MG capsule TAKE ONE PO BID   X  7 DAYS 14 capsule 0   • benzonatate (TESSALON) 200 MG capsule Take 1 capsule by mouth 3 (Three) Times a Day As Needed for Cough. 20 capsule 0     No facility-administered medications prior to visit.      No orders of the defined types were placed in this encounter.    [unfilled]  Medications Discontinued During This Encounter   Medication Reason   • benzonatate (TESSALON) 200 MG capsule *Therapy completed   • nitrofurantoin, macrocrystal-monohydrate, (MACROBID) 100 MG capsule          Return in about 1 month (around 7/18/2019) for Recheck, labs.  "

## 2019-06-20 ENCOUNTER — INFUSION (OUTPATIENT)
Dept: ONCOLOGY | Facility: HOSPITAL | Age: 79
End: 2019-06-20

## 2019-06-20 ENCOUNTER — LAB (OUTPATIENT)
Dept: LAB | Facility: HOSPITAL | Age: 79
End: 2019-06-20

## 2019-06-20 VITALS
OXYGEN SATURATION: 98 % | SYSTOLIC BLOOD PRESSURE: 146 MMHG | HEART RATE: 87 BPM | DIASTOLIC BLOOD PRESSURE: 80 MMHG | TEMPERATURE: 98.3 F

## 2019-06-20 DIAGNOSIS — E11.22 CKD STAGE 3 DUE TO TYPE 2 DIABETES MELLITUS (HCC): ICD-10-CM

## 2019-06-20 DIAGNOSIS — D46.C MYELODYSPLASTIC SYNDROME WITH 5Q DELETION (HCC): ICD-10-CM

## 2019-06-20 DIAGNOSIS — N18.30 CKD STAGE 3 DUE TO TYPE 2 DIABETES MELLITUS (HCC): ICD-10-CM

## 2019-06-20 DIAGNOSIS — D64.9 ANEMIA REQUIRING TRANSFUSIONS: Primary | ICD-10-CM

## 2019-06-20 LAB
BASOPHILS # BLD AUTO: 0.05 10*3/MM3 (ref 0–0.2)
BASOPHILS NFR BLD AUTO: 0.7 % (ref 0–1.5)
DEPRECATED RDW RBC AUTO: 85.6 FL (ref 37–54)
EOSINOPHIL # BLD AUTO: 0.16 10*3/MM3 (ref 0–0.4)
EOSINOPHIL NFR BLD AUTO: 2.3 % (ref 0.3–6.2)
ERYTHROCYTE [DISTWIDTH] IN BLOOD BY AUTOMATED COUNT: 21.9 % (ref 12.3–15.4)
HCT VFR BLD AUTO: 25 % (ref 34–46.6)
HGB BLD-MCNC: 8.2 G/DL (ref 12–15.9)
IMM GRANULOCYTES # BLD AUTO: 0.08 10*3/MM3 (ref 0–0.05)
IMM GRANULOCYTES NFR BLD AUTO: 1.2 % (ref 0–0.5)
LYMPHOCYTES # BLD AUTO: 1.54 10*3/MM3 (ref 0.7–3.1)
LYMPHOCYTES NFR BLD AUTO: 22.3 % (ref 19.6–45.3)
MCH RBC QN AUTO: 36.3 PG (ref 26.6–33)
MCHC RBC AUTO-ENTMCNC: 32.8 G/DL (ref 31.5–35.7)
MCV RBC AUTO: 110.6 FL (ref 79–97)
MONOCYTES # BLD AUTO: 0.3 10*3/MM3 (ref 0.1–0.9)
MONOCYTES NFR BLD AUTO: 4.3 % (ref 5–12)
NEUTROPHILS # BLD AUTO: 4.79 10*3/MM3 (ref 1.7–7)
NEUTROPHILS NFR BLD AUTO: 69.2 % (ref 42.7–76)
NRBC BLD AUTO-RTO: 0 /100 WBC (ref 0–0.2)
PLATELET # BLD AUTO: 377 10*3/MM3 (ref 140–450)
PMV BLD AUTO: 11.4 FL (ref 6–12)
RBC # BLD AUTO: 2.26 10*6/MM3 (ref 3.77–5.28)
WBC NRBC COR # BLD: 6.92 10*3/MM3 (ref 3.4–10.8)

## 2019-06-20 PROCEDURE — 36415 COLL VENOUS BLD VENIPUNCTURE: CPT

## 2019-06-20 PROCEDURE — 25010000002 EPOETIN ALFA PER 1000 UNITS: Performed by: NURSE PRACTITIONER

## 2019-06-20 PROCEDURE — 96372 THER/PROPH/DIAG INJ SC/IM: CPT | Performed by: NURSE PRACTITIONER

## 2019-06-20 PROCEDURE — 85025 COMPLETE CBC W/AUTO DIFF WBC: CPT | Performed by: INTERNAL MEDICINE

## 2019-06-20 RX ADMIN — ERYTHROPOIETIN 50000 UNITS: 20000 INJECTION, SOLUTION INTRAVENOUS; SUBCUTANEOUS at 10:10

## 2019-06-25 ENCOUNTER — OFFICE VISIT (OUTPATIENT)
Dept: CARDIOLOGY | Facility: CLINIC | Age: 79
End: 2019-06-25

## 2019-06-25 VITALS — HEIGHT: 62 IN | BODY MASS INDEX: 36.75 KG/M2 | WEIGHT: 199.7 LBS

## 2019-06-25 DIAGNOSIS — I10 ESSENTIAL HYPERTENSION: Primary | ICD-10-CM

## 2019-06-25 DIAGNOSIS — R07.89 ATYPICAL CHEST PAIN: ICD-10-CM

## 2019-06-25 DIAGNOSIS — E11.22 CKD STAGE 3 DUE TO TYPE 2 DIABETES MELLITUS (HCC): ICD-10-CM

## 2019-06-25 DIAGNOSIS — E11.42 TYPE 2 DIABETES MELLITUS WITH DIABETIC POLYNEUROPATHY, WITH LONG-TERM CURRENT USE OF INSULIN (HCC): ICD-10-CM

## 2019-06-25 DIAGNOSIS — R94.39 ABNORMAL STRESS TEST: ICD-10-CM

## 2019-06-25 DIAGNOSIS — I95.1 ORTHOSTATIC HYPOTENSION: ICD-10-CM

## 2019-06-25 DIAGNOSIS — N39.0 RECURRENT UTI: Primary | ICD-10-CM

## 2019-06-25 DIAGNOSIS — N18.30 CKD STAGE 3 DUE TO TYPE 2 DIABETES MELLITUS (HCC): ICD-10-CM

## 2019-06-25 DIAGNOSIS — Z79.4 TYPE 2 DIABETES MELLITUS WITH DIABETIC POLYNEUROPATHY, WITH LONG-TERM CURRENT USE OF INSULIN (HCC): ICD-10-CM

## 2019-06-25 PROBLEM — J18.9 COMMUNITY ACQUIRED PNEUMONIA OF LEFT LUNG: Status: RESOLVED | Noted: 2018-10-25 | Resolved: 2019-06-25

## 2019-06-25 PROBLEM — M10.9 GOUT ATTACK: Status: RESOLVED | Noted: 2018-11-23 | Resolved: 2019-06-25

## 2019-06-25 PROBLEM — E87.5 HYPERKALEMIA: Status: RESOLVED | Noted: 2018-05-23 | Resolved: 2019-06-25

## 2019-06-25 PROBLEM — D62 ACUTE BLOOD LOSS ANEMIA: Status: RESOLVED | Noted: 2018-11-19 | Resolved: 2019-06-25

## 2019-06-25 PROBLEM — R09.02 HYPOXIA: Status: RESOLVED | Noted: 2017-11-03 | Resolved: 2019-06-25

## 2019-06-25 PROBLEM — K92.2 GASTROINTESTINAL HEMORRHAGE: Status: RESOLVED | Noted: 2018-10-25 | Resolved: 2019-06-25

## 2019-06-25 PROCEDURE — 93000 ELECTROCARDIOGRAM COMPLETE: CPT | Performed by: INTERNAL MEDICINE

## 2019-06-25 PROCEDURE — 99213 OFFICE O/P EST LOW 20 MIN: CPT | Performed by: INTERNAL MEDICINE

## 2019-06-25 NOTE — PROGRESS NOTES
"Date of Office Visit: 2019  Encounter Provider: Kehinde Sanon MD  Place of Service: [facility]  Patient Name: Joya Singh  :1940    Chief Complaint   Patient presents with   • Hypertension   :     HPI: Joya Singh is a 79 y.o. female who presents today to follow up.  I have only seen her once before, in 2017.      She carried a reported history of CAD, but a cardiac cath was performed at [facility] in , and showed no significant intraluminal coronary atherosclerosis.  There was extraluminal coronary artery calcification.  She has brittle diabetes, a history of stroke, and hypertension.     In , a Cardiolite showed a very small, extremely mild reversible defect in the distal anterior wall.  She was not having angina, and I felt this was an extremely low risk result. I recommended medical therapy.    In 2018 she was seen in our [facility] for a myriad of complaints, which were predominantly fatigue and lightheadedness.  It was noted that her medication list was extensive, that she had significant diabetic dysautonomia, and significant chronic anemia from MDS.  It was not felt that she had an acute cardiac issue.  An echo at that time was WNL for age.     She has continued to have orthostatic symptoms and has been placed on midodrine. This has helped with the positional symptoms, although she still has a vertiginous dizziness \"all the time.\"  She has mild chronic exertional dyspnea.  She has one-second long episodes of sharp chest pain at rest.  She denies orthopnea or PND.  She has mild pedal edema.     Past Medical History:   Diagnosis Date   • Allergic rhinitis    • Anemia    • Anxiety    • Appetite absent    • Arthritis    • Asthma    • Back pain    • Bell's palsy    • Black tarry stools    • Blood in stool    • Chronic fatigue    • CKD (chronic kidney disease) stage 3, GFR 30-59 ml/min (CMS/[facility])    • Community acquired pneumonia of " left lung 10/25/2018   • Cough    • Depression    • Diabetes mellitus (CMS/HCC)     LAST A1C 6   • Diabetic gastroparesis (CMS/HCC) 2/19/2016   • Difficulty walking    • Excessive urination at night    • Frequent urination    • GERD (gastroesophageal reflux disease)    • GI bleed    • Gout    • H/O blood clots     LEFT LEG 7 OR 8 YEARS AGO   • Heat intolerance    • History of fall 10/2018   • History of prior pregnancies     x8, miscarriage 5   • History of transfusion 11/2018    due to anemia   • Hyperlipidemia    • Hypertension    • Hypothyroidism    • Normal coronary arteries     by cath 2013   • Orthostatic hypotension    • AARON (obstructive sleep apnea)     DOESNT WEAR REGULARLY   • PONV (postoperative nausea and vomiting)    • Skin cancer    • Stroke (CMS/HCC)     Several mini-strokes   • TIA (transient ischemic attack)     LAST TIA JULY 2017   • Urination pain    • UTI (urinary tract infection)     Dec 2018 and Jan 2019       Past Surgical History:   Procedure Laterality Date   • BACK SURGERY      HARDWARE   • CHOLECYSTECTOMY      OPEN   • COLONOSCOPY  2011    due for repeat in 2021   • COLONOSCOPY N/A 10/28/2018    Procedure: COLONOSCOPY;  Surgeon: Emmanuel Rogers MD;  Location: [facility] OR;  Service: Gastroenterology   • ENDOSCOPY N/A 10/26/2018    Procedure: ESOPHAGOGASTRODUODENOSCOPY;  Surgeon: Emmanuel Rogers MD;  Location: [facility] OR;  Service: Gastroenterology   • HYSTERECTOMY      PARTIAL    • NECK SURGERY     • SPINE SURGERY     • TUMOR REMOVAL Left     Leg   • UPPER GASTROINTESTINAL ENDOSCOPY  2014    gastritis.  done by dr. hastings       Social History     Socioeconomic History   • Marital status:      Spouse name: Not on file   • Number of children: 3   • Years of education: High School   • Highest education level: Not on file   Occupational History     Employer: RETIRED   Tobacco Use   • Smoking status: Never Smoker   • Smokeless tobacco: Never Used   • Tobacco comment:  CAFFEINE USE: NONE   Substance and Sexual Activity   • Alcohol use: No   • Drug use: No   • Sexual activity: Defer     Comment: EXERCISE - RARELY       Family History   Problem Relation Age of Onset   • Lupus Mother    • Heart failure Mother 59   • Heart disease Other    • Hypertension Other    • Heart attack Father        Review of Systems   Constitution: Positive for malaise/fatigue.   Musculoskeletal: Positive for arthritis, back pain, joint pain and myalgias.   Genitourinary: Positive for dysuria and frequency.   Neurological: Positive for light-headedness.   Psychiatric/Behavioral: Positive for depression.   All other systems reviewed and are negative.      Allergies   Allergen Reactions   • Baclofen Anxiety     Panic attack, nightmares   • Codeine Itching and Rash   • Lisinopril Cough   • Morphine Hives   • Penicillins Rash     Tolerates cephalosporins          Current Outpatient Medications:   •  ACCU-CHEK FASTCLIX LANCETS misc, TEST 3-4 TIMES DAILY AS DIRECTED, Disp: 400 each, Rfl: 3  •  ACCU-CHEK SMARTVIEW test strip, TEST BLOOD SUGAR THREE TIMES DAILY OR AS DIRECTED, Disp: 300 each, Rfl: 3  •  acetaminophen (TYLENOL) 325 MG tablet, Take 2 tablets by mouth Every 6 (Six) Hours As Needed for Mild Pain . OTC product, Disp: 40 tablet, Rfl: 0  •  albuterol sulfate  (90 Base) MCG/ACT inhaler, Inhale 2 puffs Every 4 (Four) Hours As Needed for Wheezing., Disp: 1 inhaler, Rfl: 3  •  Alcohol Swabs (B-D SINGLE USE SWABS REGULAR) pads, , Disp: , Rfl:   •  atorvastatin (LIPITOR) 10 MG tablet, TAKE ONE TABLET BY MOUTH AT BEDTIME, Disp: 90 tablet, Rfl: 1  •  Blood Glucose Monitoring Suppl (ACCU-CHEK KENNETH SMARTVIEW) w/Device kit, TEST blood sugar three times daily or as directed, Disp: 1 kit, Rfl: 0  •  Calcium Carbonate-Vit D-Min (CALCIUM 1200) 1998-5067 MG-UNIT chewable tablet, Chew 1 tablet Daily., Disp: , Rfl:   •  cyclobenzaprine (FLEXERIL) 10 MG tablet, TAKE ONE TABLET BY MOUTH TWICE DAILY as needed for muscle  "spasms, Disp: 30 tablet, Rfl: 0  •  diphenhydrAMINE (BENADRYL) 25 mg capsule, Take 25 mg by mouth Every 6 (Six) Hours As Needed for Itching., Disp: , Rfl:   •  DULoxetine (CYMBALTA) 30 MG capsule, TAKE ONE CAPSULE BY MOUTH TWICE DAILY, Disp: 60 capsule, Rfl: 5  •  fluticasone (FLONASE) 50 MCG/ACT nasal spray, 2 sprays into the nostril(s) as directed by provider Daily., Disp: 16 g, Rfl: 0  •  furosemide (LASIX) 20 MG tablet, TAKE ONE (1) TABLET ORALLY (BY MOUTH) ONCE DAILY, Disp: 90 tablet, Rfl: 1  •  gabapentin (NEURONTIN) 400 MG capsule, TAKE ONE CAPSULE BY MOUTH EVERY MORNING, ONE AT NOON, AND TWO AT BEDTIME, Disp: 120 capsule, Rfl: 5  •  HYDROcodone-acetaminophen (NORCO) 5-325 MG per tablet, Take one po bid    Prn  Moderate pain, Disp: 60 tablet, Rfl: 0  •  insulin aspart (novoLOG FLEXPEN) 100 UNIT/ML solution pen-injector sc pen, Inject 54 Units under the skin into the appropriate area as directed Every Morning Before Breakfast. 30 units with breakfast 35 units with lunch 35 units with dinner , Disp: , Rfl:   •  insulin aspart (novoLOG) 100 UNIT/ML injection, Inject 15 Units under the skin into the appropriate area as directed Daily With Breakfast. 15 units with breakfast 25 units with lunch 25 units with dinner, Disp: , Rfl:   •  Insulin Degludec (TRESIBA FLEXTOUCH) 200 UNIT/ML solution pen-injector, Inject 54 Units under the skin into the appropriate area as directed every night at bedtime., Disp: 9 mL, Rfl: 11  •  levothyroxine (SYNTHROID, LEVOTHROID) 88 MCG tablet, TAKE ONE TABLET BY MOUTH EVERY DAY, Disp: 90 tablet, Rfl: 1  •  midodrine (PROAMATINE) 2.5 MG tablet, Take 1 tablet by mouth 3 (Three) Times a Day., Disp: 90 tablet, Rfl: 3  •  Multiple Vitamins-Minerals (CENTRUM SILVER PO), Take  by mouth Daily., Disp: , Rfl:   •  Needle, Disp, (BD DISP NEEDLES) 30G X 1/2\" misc, To be used 3 times daily with Novolog Flexpen., Disp: 100 each, Rfl: 5  •  NYSTATIN [id number] UNIT/GM powder, APPLY TOPICALLY 3 TIMES A " "DAY, Disp: 60 g, Rfl: 2  •  O2 (OXYGEN), Inhale 2 L/min Every Night. Uses 2L  overnight only, Disp: , Rfl:   •  omeprazole (priLOSEC) 40 MG capsule, TAKE ONE CAPSULE BY MOUTH EVERY DAY, Disp: 90 capsule, Rfl: 1  •  potassium chloride (K-DUR) 10 MEQ CR tablet, TAKE ONE (1) TABLET ORALLY (BY MOUTH) ONCE DAILY, Disp: 90 tablet, Rfl: 2  •  promethazine (PHENERGAN) 12.5 MG tablet, 1/2 to 1 tablet every 6 hours as needed for nausea, Disp: 20 tablet, Rfl: 0      Objective:     Vitals:    06/25/19 1157   Weight: 90.6 kg (199 lb 11.2 oz)   Height: 157.5 cm (62\")     Body mass index is 36.53 kg/m².    Physical Exam      ECG 12 Lead  Date/Time: 6/25/2019 12:06 PM  Performed by: Kehinde Sanon MD  Authorized by: Kehinde Sanon MD   Comparison: compared with previous ECG   Similar to previous ECG  Rhythm: sinus rhythm  Conduction: right bundle branch block and left anterior fascicular block  Other: no other findings    Clinical impression: abnormal EKG              Assessment:       Diagnosis Plan   1. Essential hypertension     2. Orthostatic hypotension     3. Atypical chest pain     4. Abnormal stress test     5. CKD stage 3 due to type 2 diabetes mellitus (CMS/[facility])     6. Type 2 diabetes mellitus with diabetic polyneuropathy, with long-term current use of insulin (CMS/[facility])            Plan:       1/2.  She has a history of essential hypertension, and now has orthostasis due to diabetic dysautonomia.  She's doing fairly well on midodrine so we'll continue that.  She requires a tiny dose of furosemide for leg swelling but I wouldn't want to increase it.    3/4.  Her one-second long sharp chest pain is noncardiac.  She had a minimally abnormal SPECT in 2017.  In the absence of angina, I would not recommend further evaluation of this low risk finding.    Sincerely,       Kehinde Sanon MD                "

## 2019-06-27 ENCOUNTER — INFUSION (OUTPATIENT)
Dept: ONCOLOGY | Facility: HOSPITAL | Age: 79
End: 2019-06-27

## 2019-06-27 ENCOUNTER — LAB (OUTPATIENT)
Dept: LAB | Facility: HOSPITAL | Age: 79
End: 2019-06-27

## 2019-06-27 VITALS
SYSTOLIC BLOOD PRESSURE: 117 MMHG | DIASTOLIC BLOOD PRESSURE: 48 MMHG | OXYGEN SATURATION: 95 % | HEART RATE: 78 BPM | TEMPERATURE: 97.9 F

## 2019-06-27 DIAGNOSIS — E11.22 CKD STAGE 3 DUE TO TYPE 2 DIABETES MELLITUS (HCC): ICD-10-CM

## 2019-06-27 DIAGNOSIS — D46.C MYELODYSPLASTIC SYNDROME WITH 5Q DELETION (HCC): ICD-10-CM

## 2019-06-27 DIAGNOSIS — D64.9 ANEMIA REQUIRING TRANSFUSIONS: Primary | ICD-10-CM

## 2019-06-27 DIAGNOSIS — N18.30 CKD STAGE 3 DUE TO TYPE 2 DIABETES MELLITUS (HCC): ICD-10-CM

## 2019-06-27 LAB
BASOPHILS # BLD AUTO: 0.08 10*3/MM3 (ref 0–0.2)
BASOPHILS # BLD AUTO: 0.14 10*3/MM3 (ref 0–0.2)
BASOPHILS NFR BLD AUTO: 1 % (ref 0–1.5)
BASOPHILS NFR BLD AUTO: 1.3 % (ref 0–1.5)
DEPRECATED RDW RBC AUTO: 85.5 FL (ref 37–54)
DEPRECATED RDW RBC AUTO: 90.6 FL (ref 37–54)
EOSINOPHIL # BLD AUTO: 0.35 10*3/MM3 (ref 0–0.4)
EOSINOPHIL # BLD AUTO: 0.58 10*3/MM3 (ref 0–0.4)
EOSINOPHIL NFR BLD AUTO: 4.2 % (ref 0.3–6.2)
EOSINOPHIL NFR BLD AUTO: 5.3 % (ref 0.3–6.2)
ERYTHROCYTE [DISTWIDTH] IN BLOOD BY AUTOMATED COUNT: 22.2 % (ref 12.3–15.4)
ERYTHROCYTE [DISTWIDTH] IN BLOOD BY AUTOMATED COUNT: 22.5 % (ref 12.3–15.4)
HCT VFR BLD AUTO: 27.5 % (ref 34–46.6)
HCT VFR BLD AUTO: 29.9 % (ref 34–46.6)
HGB BLD-MCNC: 10.3 G/DL (ref 12–15.9)
HGB BLD-MCNC: 9 G/DL (ref 12–15.9)
IMM GRANULOCYTES # BLD AUTO: 0.12 10*3/MM3 (ref 0–0.05)
IMM GRANULOCYTES # BLD AUTO: 0.17 10*3/MM3 (ref 0–0.05)
IMM GRANULOCYTES NFR BLD AUTO: 1.5 % (ref 0–0.5)
IMM GRANULOCYTES NFR BLD AUTO: 1.6 % (ref 0–0.5)
LYMPHOCYTES # BLD AUTO: 1.91 10*3/MM3 (ref 0.7–3.1)
LYMPHOCYTES # BLD AUTO: 2.97 10*3/MM3 (ref 0.7–3.1)
LYMPHOCYTES NFR BLD AUTO: 23.2 % (ref 19.6–45.3)
LYMPHOCYTES NFR BLD AUTO: 27.1 % (ref 19.6–45.3)
MCH RBC QN AUTO: 36.6 PG (ref 26.6–33)
MCH RBC QN AUTO: 36.9 PG (ref 26.6–33)
MCHC RBC AUTO-ENTMCNC: 32.7 G/DL (ref 31.5–35.7)
MCHC RBC AUTO-ENTMCNC: 34.4 G/DL (ref 31.5–35.7)
MCV RBC AUTO: 107.2 FL (ref 79–97)
MCV RBC AUTO: 111.8 FL (ref 79–97)
MONOCYTES # BLD AUTO: 0.29 10*3/MM3 (ref 0.1–0.9)
MONOCYTES # BLD AUTO: 0.41 10*3/MM3 (ref 0.1–0.9)
MONOCYTES NFR BLD AUTO: 3.5 % (ref 5–12)
MONOCYTES NFR BLD AUTO: 3.7 % (ref 5–12)
NEUTROPHILS # BLD AUTO: 5.49 10*3/MM3 (ref 1.7–7)
NEUTROPHILS # BLD AUTO: 6.67 10*3/MM3 (ref 1.7–7)
NEUTROPHILS NFR BLD AUTO: 61 % (ref 42.7–76)
NEUTROPHILS NFR BLD AUTO: 66.6 % (ref 42.7–76)
NRBC BLD AUTO-RTO: 0 /100 WBC (ref 0–0.2)
NRBC BLD AUTO-RTO: 0 /100 WBC (ref 0–0.2)
PLATELET # BLD AUTO: 346 10*3/MM3 (ref 140–450)
PLATELET # BLD AUTO: 375 10*3/MM3 (ref 140–450)
PMV BLD AUTO: 12.3 FL (ref 6–12)
PMV BLD AUTO: 12.3 FL (ref 6–12)
RBC # BLD AUTO: 2.46 10*6/MM3 (ref 3.77–5.28)
RBC # BLD AUTO: 2.79 10*6/MM3 (ref 3.77–5.28)
WBC NRBC COR # BLD: 10.94 10*3/MM3 (ref 3.4–10.8)
WBC NRBC COR # BLD: 8.24 10*3/MM3 (ref 3.4–10.8)

## 2019-06-27 PROCEDURE — 25010000002 EPOETIN ALFA PER 1000 UNITS: Performed by: NURSE PRACTITIONER

## 2019-06-27 PROCEDURE — 85025 COMPLETE CBC W/AUTO DIFF WBC: CPT | Performed by: INTERNAL MEDICINE

## 2019-06-27 PROCEDURE — 36415 COLL VENOUS BLD VENIPUNCTURE: CPT

## 2019-06-27 PROCEDURE — 96372 THER/PROPH/DIAG INJ SC/IM: CPT | Performed by: NURSE PRACTITIONER

## 2019-06-27 RX ADMIN — ERYTHROPOIETIN 50000 UNITS: 20000 INJECTION, SOLUTION INTRAVENOUS; SUBCUTANEOUS at 10:47

## 2019-06-27 NOTE — TELEPHONE ENCOUNTER
Pt was given a new RX, Medrol dose pack. No refills. Pt was mad aware that the meds are there waiting for her. dg  
Statement Selected

## 2019-06-27 NOTE — PROGRESS NOTES
CBC rechecked today due to large increase in HGB without receiving blood transfusion.  Procrit given after second HGB resulted.  Pt c/o UTI symptoms and is going to Dr Mercado's office after this visit.

## 2019-06-29 RX ORDER — TRIAMCINOLONE ACETONIDE 40 MG/ML
80 INJECTION, SUSPENSION INTRA-ARTICULAR; INTRAMUSCULAR
Status: COMPLETED | OUTPATIENT
Start: 2019-06-18 | End: 2019-06-18

## 2019-06-29 RX ORDER — LIDOCAINE HYDROCHLORIDE 10 MG/ML
4 INJECTION, SOLUTION EPIDURAL; INFILTRATION; INTRACAUDAL; PERINEURAL
Status: COMPLETED | OUTPATIENT
Start: 2019-06-18 | End: 2019-06-18

## 2019-06-30 ENCOUNTER — RESULTS ENCOUNTER (OUTPATIENT)
Dept: INTERNAL MEDICINE | Facility: CLINIC | Age: 79
End: 2019-06-30

## 2019-06-30 DIAGNOSIS — N39.0 RECURRENT UTI: ICD-10-CM

## 2019-06-30 LAB
APPEARANCE UR: CLEAR
BACTERIA #/AREA URNS HPF: ABNORMAL /HPF
BACTERIA UR CULT: ABNORMAL
BACTERIA UR CULT: ABNORMAL
BILIRUB UR QL STRIP: NEGATIVE
COLOR UR: YELLOW
CRYSTALS URNS MICRO: ABNORMAL
EPI CELLS #/AREA URNS HPF: ABNORMAL /HPF
GLUCOSE UR QL: ABNORMAL
HGB UR QL STRIP: NEGATIVE
KETONES UR QL STRIP: NEGATIVE
LEUKOCYTE ESTERASE UR QL STRIP: ABNORMAL
MICRO URNS: ABNORMAL
MUCOUS THREADS URNS QL MICRO: PRESENT /HPF
NITRITE UR QL STRIP: NEGATIVE
OTHER ANTIBIOTIC SUSC ISLT: ABNORMAL
PH UR STRIP: 5.5 [PH] (ref 5–7.5)
PROT UR QL STRIP: NEGATIVE
RBC #/AREA URNS HPF: ABNORMAL /HPF
SP GR UR: 1.01 (ref 1–1.03)
UNIDENT CRYS URNS QL MICRO: PRESENT
URINALYSIS REFLEX: ABNORMAL
UROBILINOGEN UR STRIP-MCNC: 0.2 MG/DL (ref 0.2–1)
WBC #/AREA URNS HPF: >30 /HPF

## 2019-07-01 RX ORDER — CEPHALEXIN 500 MG/1
500 CAPSULE ORAL 2 TIMES DAILY
Qty: 14 CAPSULE | Refills: 0 | Status: SHIPPED | OUTPATIENT
Start: 2019-07-01 | End: 2019-07-16

## 2019-07-05 ENCOUNTER — LAB (OUTPATIENT)
Dept: LAB | Facility: HOSPITAL | Age: 79
End: 2019-07-05

## 2019-07-05 ENCOUNTER — INFUSION (OUTPATIENT)
Dept: ONCOLOGY | Facility: HOSPITAL | Age: 79
End: 2019-07-05

## 2019-07-05 VITALS
OXYGEN SATURATION: 96 % | TEMPERATURE: 97.8 F | WEIGHT: 201 LBS | HEART RATE: 78 BPM | DIASTOLIC BLOOD PRESSURE: 65 MMHG | SYSTOLIC BLOOD PRESSURE: 120 MMHG | BODY MASS INDEX: 36.76 KG/M2

## 2019-07-05 DIAGNOSIS — N18.30 CKD STAGE 3 DUE TO TYPE 2 DIABETES MELLITUS (HCC): ICD-10-CM

## 2019-07-05 DIAGNOSIS — D64.9 ANEMIA REQUIRING TRANSFUSIONS: Primary | ICD-10-CM

## 2019-07-05 DIAGNOSIS — D46.C MYELODYSPLASTIC SYNDROME WITH 5Q DELETION (HCC): ICD-10-CM

## 2019-07-05 DIAGNOSIS — E11.22 CKD STAGE 3 DUE TO TYPE 2 DIABETES MELLITUS (HCC): ICD-10-CM

## 2019-07-05 LAB
BASOPHILS # BLD AUTO: 0.1 10*3/MM3 (ref 0–0.2)
BASOPHILS NFR BLD AUTO: 1.3 % (ref 0–1.5)
DEPRECATED RDW RBC AUTO: 83.5 FL (ref 37–54)
EOSINOPHIL # BLD AUTO: 0.39 10*3/MM3 (ref 0–0.4)
EOSINOPHIL NFR BLD AUTO: 4.9 % (ref 0.3–6.2)
ERYTHROCYTE [DISTWIDTH] IN BLOOD BY AUTOMATED COUNT: 21.3 % (ref 12.3–15.4)
HCT VFR BLD AUTO: 29.7 % (ref 34–46.6)
HGB BLD-MCNC: 9.9 G/DL (ref 12–15.9)
IMM GRANULOCYTES # BLD AUTO: 0.1 10*3/MM3 (ref 0–0.05)
IMM GRANULOCYTES NFR BLD AUTO: 1.3 % (ref 0–0.5)
LYMPHOCYTES # BLD AUTO: 2.34 10*3/MM3 (ref 0.7–3.1)
LYMPHOCYTES NFR BLD AUTO: 29.6 % (ref 19.6–45.3)
MCH RBC QN AUTO: 36.7 PG (ref 26.6–33)
MCHC RBC AUTO-ENTMCNC: 33.3 G/DL (ref 31.5–35.7)
MCV RBC AUTO: 110 FL (ref 79–97)
MONOCYTES # BLD AUTO: 0.3 10*3/MM3 (ref 0.1–0.9)
MONOCYTES NFR BLD AUTO: 3.8 % (ref 5–12)
NEUTROPHILS # BLD AUTO: 4.68 10*3/MM3 (ref 1.7–7)
NEUTROPHILS NFR BLD AUTO: 59.1 % (ref 42.7–76)
NRBC BLD AUTO-RTO: 0.3 /100 WBC (ref 0–0.2)
PLATELET # BLD AUTO: 453 10*3/MM3 (ref 140–450)
PMV BLD AUTO: 12 FL (ref 6–12)
RBC # BLD AUTO: 2.7 10*6/MM3 (ref 3.77–5.28)
WBC NRBC COR # BLD: 7.91 10*3/MM3 (ref 3.4–10.8)

## 2019-07-05 PROCEDURE — 36415 COLL VENOUS BLD VENIPUNCTURE: CPT

## 2019-07-05 PROCEDURE — 85025 COMPLETE CBC W/AUTO DIFF WBC: CPT | Performed by: INTERNAL MEDICINE

## 2019-07-05 PROCEDURE — 96372 THER/PROPH/DIAG INJ SC/IM: CPT | Performed by: NURSE PRACTITIONER

## 2019-07-05 PROCEDURE — 25010000002 EPOETIN ALFA PER 1000 UNITS: Performed by: NURSE PRACTITIONER

## 2019-07-05 RX ORDER — PROMETHAZINE HYDROCHLORIDE 12.5 MG/1
TABLET ORAL
Qty: 20 TABLET | Refills: 0 | Status: ON HOLD | OUTPATIENT
Start: 2019-07-05 | End: 2020-07-29

## 2019-07-05 RX ORDER — BENZONATATE 200 MG/1
CAPSULE ORAL
Qty: 20 CAPSULE | Refills: 0 | Status: SHIPPED | OUTPATIENT
Start: 2019-07-05 | End: 2019-12-05 | Stop reason: SDUPTHER

## 2019-07-05 RX ADMIN — ERYTHROPOIETIN 50000 UNITS: 20000 INJECTION, SOLUTION INTRAVENOUS; SUBCUTANEOUS at 09:19

## 2019-07-08 RX ORDER — HYDROCODONE BITARTRATE AND ACETAMINOPHEN 5; 325 MG/1; MG/1
TABLET ORAL
Qty: 60 TABLET | Refills: 0 | Status: SHIPPED | OUTPATIENT
Start: 2019-07-08 | End: 2019-09-20 | Stop reason: HOSPADM

## 2019-07-11 ENCOUNTER — LAB (OUTPATIENT)
Dept: LAB | Facility: HOSPITAL | Age: 79
End: 2019-07-11

## 2019-07-11 ENCOUNTER — INFUSION (OUTPATIENT)
Dept: ONCOLOGY | Facility: HOSPITAL | Age: 79
End: 2019-07-11

## 2019-07-11 VITALS
DIASTOLIC BLOOD PRESSURE: 78 MMHG | TEMPERATURE: 98.4 F | OXYGEN SATURATION: 95 % | SYSTOLIC BLOOD PRESSURE: 116 MMHG | HEART RATE: 72 BPM

## 2019-07-11 DIAGNOSIS — E11.22 CKD STAGE 3 DUE TO TYPE 2 DIABETES MELLITUS (HCC): ICD-10-CM

## 2019-07-11 DIAGNOSIS — D64.9 ANEMIA REQUIRING TRANSFUSIONS: Primary | ICD-10-CM

## 2019-07-11 DIAGNOSIS — N18.30 CKD STAGE 3 DUE TO TYPE 2 DIABETES MELLITUS (HCC): ICD-10-CM

## 2019-07-11 DIAGNOSIS — D46.C MYELODYSPLASTIC SYNDROME WITH 5Q DELETION (HCC): ICD-10-CM

## 2019-07-11 LAB
BASOPHILS # BLD AUTO: 0.07 10*3/MM3 (ref 0–0.2)
BASOPHILS NFR BLD AUTO: 1 % (ref 0–1.5)
DEPRECATED RDW RBC AUTO: 80 FL (ref 37–54)
EOSINOPHIL # BLD AUTO: 0.32 10*3/MM3 (ref 0–0.4)
EOSINOPHIL NFR BLD AUTO: 4.5 % (ref 0.3–6.2)
ERYTHROCYTE [DISTWIDTH] IN BLOOD BY AUTOMATED COUNT: 20 % (ref 12.3–15.4)
HCT VFR BLD AUTO: 27.4 % (ref 34–46.6)
HGB BLD-MCNC: 9.4 G/DL (ref 12–15.9)
IMM GRANULOCYTES # BLD AUTO: 0.16 10*3/MM3 (ref 0–0.05)
IMM GRANULOCYTES NFR BLD AUTO: 2.3 % (ref 0–0.5)
LYMPHOCYTES # BLD AUTO: 2.65 10*3/MM3 (ref 0.7–3.1)
LYMPHOCYTES NFR BLD AUTO: 37.3 % (ref 19.6–45.3)
MCH RBC QN AUTO: 37.6 PG (ref 26.6–33)
MCHC RBC AUTO-ENTMCNC: 34.3 G/DL (ref 31.5–35.7)
MCV RBC AUTO: 109.6 FL (ref 79–97)
MONOCYTES # BLD AUTO: 0.28 10*3/MM3 (ref 0.1–0.9)
MONOCYTES NFR BLD AUTO: 3.9 % (ref 5–12)
NEUTROPHILS # BLD AUTO: 3.63 10*3/MM3 (ref 1.7–7)
NEUTROPHILS NFR BLD AUTO: 51 % (ref 42.7–76)
NRBC BLD AUTO-RTO: 0.4 /100 WBC (ref 0–0.2)
PLATELET # BLD AUTO: 395 10*3/MM3 (ref 140–450)
PMV BLD AUTO: 11.9 FL (ref 6–12)
RBC # BLD AUTO: 2.5 10*6/MM3 (ref 3.77–5.28)
WBC NRBC COR # BLD: 7.11 10*3/MM3 (ref 3.4–10.8)

## 2019-07-11 PROCEDURE — 25010000002 EPOETIN ALFA PER 1000 UNITS: Performed by: INTERNAL MEDICINE

## 2019-07-11 PROCEDURE — 85025 COMPLETE CBC W/AUTO DIFF WBC: CPT | Performed by: INTERNAL MEDICINE

## 2019-07-11 PROCEDURE — 96372 THER/PROPH/DIAG INJ SC/IM: CPT

## 2019-07-11 PROCEDURE — 36415 COLL VENOUS BLD VENIPUNCTURE: CPT

## 2019-07-11 RX ADMIN — ERYTHROPOIETIN 10000 UNITS: 10000 INJECTION, SOLUTION INTRAVENOUS; SUBCUTANEOUS at 10:18

## 2019-07-11 RX ADMIN — ERYTHROPOIETIN 40000 UNITS: 40000 INJECTION, SOLUTION INTRAVENOUS; SUBCUTANEOUS at 10:18

## 2019-07-16 ENCOUNTER — OFFICE VISIT (OUTPATIENT)
Dept: INTERNAL MEDICINE | Facility: CLINIC | Age: 79
End: 2019-07-16

## 2019-07-16 ENCOUNTER — HOSPITAL ENCOUNTER (OUTPATIENT)
Dept: ULTRASOUND IMAGING | Facility: HOSPITAL | Age: 79
Discharge: HOME | End: 2019-07-16
Admitting: INTERNAL MEDICINE

## 2019-07-16 VITALS
RESPIRATION RATE: 18 BRPM | BODY MASS INDEX: 36.76 KG/M2 | SYSTOLIC BLOOD PRESSURE: 116 MMHG | HEART RATE: 89 BPM | OXYGEN SATURATION: 93 % | HEIGHT: 62 IN | TEMPERATURE: 98.9 F | DIASTOLIC BLOOD PRESSURE: 48 MMHG

## 2019-07-16 DIAGNOSIS — N18.30 CKD STAGE 3 DUE TO TYPE 2 DIABETES MELLITUS (HCC): ICD-10-CM

## 2019-07-16 DIAGNOSIS — I10 ESSENTIAL HYPERTENSION: Primary | ICD-10-CM

## 2019-07-16 DIAGNOSIS — Z79.4 TYPE 2 DIABETES MELLITUS WITH DIABETIC NEUROPATHY, WITH LONG-TERM CURRENT USE OF INSULIN (HCC): ICD-10-CM

## 2019-07-16 DIAGNOSIS — Z86.718 HISTORY OF DEEP VENOUS THROMBOSIS (DVT) OF DISTAL VEIN OF LEFT LOWER EXTREMITY: ICD-10-CM

## 2019-07-16 DIAGNOSIS — K21.9 GASTROESOPHAGEAL REFLUX DISEASE, ESOPHAGITIS PRESENCE NOT SPECIFIED: ICD-10-CM

## 2019-07-16 DIAGNOSIS — F33.1 MODERATE EPISODE OF RECURRENT MAJOR DEPRESSIVE DISORDER (HCC): ICD-10-CM

## 2019-07-16 DIAGNOSIS — E11.40 TYPE 2 DIABETES MELLITUS WITH DIABETIC NEUROPATHY, WITH LONG-TERM CURRENT USE OF INSULIN (HCC): ICD-10-CM

## 2019-07-16 DIAGNOSIS — I95.1 ORTHOSTATIC HYPOTENSION: ICD-10-CM

## 2019-07-16 DIAGNOSIS — I82.499 DEEP VEIN THROMBOSIS (DVT) OF OTHER VEIN OF LOWER EXTREMITY, UNSPECIFIED CHRONICITY, UNSPECIFIED LATERALITY (HCC): ICD-10-CM

## 2019-07-16 DIAGNOSIS — E11.22 CKD STAGE 3 DUE TO TYPE 2 DIABETES MELLITUS (HCC): ICD-10-CM

## 2019-07-16 PROCEDURE — 93971 EXTREMITY STUDY: CPT

## 2019-07-16 PROCEDURE — 99215 OFFICE O/P EST HI 40 MIN: CPT | Performed by: INTERNAL MEDICINE

## 2019-07-16 RX ORDER — INSULIN ASPART 100 [IU]/ML
INJECTION, SOLUTION INTRAVENOUS; SUBCUTANEOUS
Qty: 15 ML | Refills: 5 | Status: SHIPPED | OUTPATIENT
Start: 2019-07-16 | End: 2019-09-16

## 2019-07-16 NOTE — PROGRESS NOTES
Joya Singh is a 79 y.o. female, who presents with a chief complaint of   Chief Complaint   Patient presents with   • Dizziness   • Heartburn       HPI   Pt here with her son. She started feeling bad this am.  She got very dizzy.  She denies soa, chest pain, of LE edema.  She feels weak all over.  She denies dysuria, cough, or congestion.  She has been drinking lots of water.     MDS - pt on high dose procrit weekly (50,000 units q week) and goal hgb > 8.  Last blood transfusion was about 3 months ago.       DM - a1c 9.8 today.  This is her first a1c in a while bc she had been getting frequent blood transfusions.  Glucoses have been in the 200's.  She is taking tresiba at night.  She has novolog but is only taking it once a day instead of tid.  She says she hasn't felt like doing anything and hasn't taken meds regularly or checked glucoses regularly.      GERD - on PPI.  Pt still having some breakthrough heartburn around 5-6pm.      htn with orthostasis - on lasix and midodrine.     Her left calf has been hurting for over a month.  She has had a dvt in this leg in the past.  She was on chronic anticoagulation but this was stopped bc of anemia and bleeding.       The following portions of the patient's history were reviewed and updated as appropriate: allergies, current medications, past family history, past medical history, past social history, past surgical history and problem list.    Allergies: Baclofen; Codeine; Lisinopril; Morphine; and Penicillins    Review of Systems   HENT: Negative.    Eyes: Negative.    Respiratory: Negative.    Cardiovascular: Negative.    Gastrointestinal: Negative.    Endocrine: Negative.    Genitourinary: Negative.    Musculoskeletal:        Left leg pain   Skin: Negative.    Allergic/Immunologic: Negative.    Neurological: Positive for dizziness and weakness.   Hematological: Negative.    Psychiatric/Behavioral: Negative.    All other systems reviewed and are negative.             Wt Readings from Last 3 Encounters:   07/05/19 91.2 kg (201 lb)   06/25/19 90.6 kg (199 lb 11.2 oz)   06/18/19 89.8 kg (198 lb)     Temp Readings from Last 3 Encounters:   07/16/19 98.9 °F (37.2 °C)   07/11/19 98.4 °F (36.9 °C)   07/05/19 97.8 °F (36.6 °C)     BP Readings from Last 3 Encounters:   07/16/19 116/48   07/11/19 116/78   07/05/19 120/65     Pulse Readings from Last 3 Encounters:   07/16/19 89   07/11/19 72   07/05/19 78     Body mass index is 36.76 kg/m².  @LASTSAO2(3)@    Physical Exam   Constitutional: She is oriented to person, place, and time. She appears well-developed and well-nourished. No distress.   HENT:   Head: Normocephalic and atraumatic.   Right Ear: External ear normal.   Left Ear: External ear normal.   Nose: Nose normal.   Mouth/Throat: Oropharynx is clear and moist.   Eyes: Conjunctivae and EOM are normal. Pupils are equal, round, and reactive to light.   Neck: Normal range of motion. Neck supple.   Cardiovascular: Normal rate, regular rhythm, normal heart sounds and intact distal pulses.   Pulmonary/Chest: Effort normal and breath sounds normal. No respiratory distress. She has no wheezes.   Musculoskeletal:   In wheelchair  Tenderness of left posterior thigh, post knee, and calf   Neurological: She is alert and oriented to person, place, and time.   Skin: Skin is warm and dry.   Psychiatric: She has a normal mood and affect. Her behavior is normal. Judgment and thought content normal.   Nursing note and vitals reviewed.      Results for orders placed or performed in visit on 07/11/19   CBC Auto Differential   Result Value Ref Range    WBC 7.11 3.40 - 10.80 10*3/mm3    RBC 2.50 (L) 3.77 - 5.28 10*6/mm3    Hemoglobin 9.4 (L) 12.0 - 15.9 g/dL    Hematocrit 27.4 (L) 34.0 - 46.6 %    .6 (H) 79.0 - 97.0 fL    MCH 37.6 (H) 26.6 - 33.0 pg    MCHC 34.3 31.5 - 35.7 g/dL    RDW 20.0 (H) 12.3 - 15.4 %    RDW-SD 80.0 (H) 37.0 - 54.0 fl    MPV 11.9 6.0 - 12.0 fL    Platelets 395 140 -  450 10*3/mm3    Neutrophil % 51.0 42.7 - 76.0 %    Lymphocyte % 37.3 19.6 - 45.3 %    Monocyte % 3.9 (L) 5.0 - 12.0 %    Eosinophil % 4.5 0.3 - 6.2 %    Basophil % 1.0 0.0 - 1.5 %    Immature Grans % 2.3 (H) 0.0 - 0.5 %    Neutrophils, Absolute 3.63 1.70 - 7.00 10*3/mm3    Lymphocytes, Absolute 2.65 0.70 - 3.10 10*3/mm3    Monocytes, Absolute 0.28 0.10 - 0.90 10*3/mm3    Eosinophils, Absolute 0.32 0.00 - 0.40 10*3/mm3    Basophils, Absolute 0.07 0.00 - 0.20 10*3/mm3    Immature Grans, Absolute 0.16 (H) 0.00 - 0.05 10*3/mm3    nRBC 0.4 (H) 0.0 - 0.2 /100 WBC           Joya was seen today for dizziness and heartburn.    Diagnoses and all orders for this visit:    Essential hypertension    History of deep venous thrombosis (DVT) of distal vein of left lower extremity  -     US venous doppler lower extremity left (duplex); Future    Moderate episode of recurrent major depressive disorder (CMS/[facility])    Deep vein thrombosis (DVT) of other vein of lower extremity, unspecified chronicity, unspecified laterality (CMS/[facility]) - Concern for dvt given pt's hx recurrent clots, leg pain, and fact pt off of anticoagulation since GI bleed last fall.  Will check doppler LE today.    -     US venous doppler lower extremity left (duplex); Future    Orthostatic hypotension    Gastroesophageal reflux disease, esophagitis presence not specified    CKD stage 3 due to type 2 diabetes mellitus (CMS/[facility])    Type 2 diabetes mellitus with diabetic neuropathy, with long-term current use of insulin (CMS/[facility]) - pt's glucoses out of control.  a1c 9.8 and poc glucose 250.  In the past whenever pt's glucoses out of control she has more dizziness and feels bad.  We had a long discussion about the importance of taking all doses of insulin and checking glucoses regularly.      40 min spent in patient care with >50% spent in counseling about above issues and treatment plan.     Addendum: LE doppler neg for DVT.         Outpatient Medications Prior to Visit    Medication Sig Dispense Refill   • ACCU-CHEK FASTCLIX LANCETS misc TEST 3-4 TIMES DAILY AS DIRECTED 400 each 3   • ACCU-CHEK SMARTVIEW test strip TEST BLOOD SUGAR THREE TIMES DAILY OR AS DIRECTED 300 each 3   • acetaminophen (TYLENOL) 325 MG tablet Take 2 tablets by mouth Every 6 (Six) Hours As Needed for Mild Pain . OTC product 40 tablet 0   • albuterol sulfate  (90 Base) MCG/ACT inhaler Inhale 2 puffs Every 4 (Four) Hours As Needed for Wheezing. 1 inhaler 3   • Alcohol Swabs (B-D SINGLE USE SWABS REGULAR) pads      • atorvastatin (LIPITOR) 10 MG tablet TAKE ONE TABLET BY MOUTH AT BEDTIME 90 tablet 1   • benzonatate (TESSALON) 200 MG capsule TAKE ONE CAPSULE BY MOUTH THREE TIMES DAILY AS NEEDED FOR COUGH 20 capsule 0   • Blood Glucose Monitoring Suppl (ACCU-CHEK KENNETH SMARTVIEW) w/Device kit TEST blood sugar three times daily or as directed 1 kit 0   • Calcium Carbonate-Vit D-Min (CALCIUM 1200) 1333-4793 MG-UNIT chewable tablet Chew 1 tablet Daily.     • cyclobenzaprine (FLEXERIL) 10 MG tablet TAKE ONE TABLET BY MOUTH TWICE DAILY as needed for muscle spasms 30 tablet 0   • diphenhydrAMINE (BENADRYL) 25 mg capsule Take 25 mg by mouth Every 6 (Six) Hours As Needed for Itching.     • DULoxetine (CYMBALTA) 30 MG capsule TAKE ONE CAPSULE BY MOUTH TWICE DAILY 60 capsule 5   • fluticasone (FLONASE) 50 MCG/ACT nasal spray 2 sprays into the nostril(s) as directed by provider Daily. 16 g 0   • furosemide (LASIX) 20 MG tablet TAKE ONE (1) TABLET ORALLY (BY MOUTH) ONCE DAILY 90 tablet 1   • gabapentin (NEURONTIN) 400 MG capsule TAKE ONE CAPSULE BY MOUTH EVERY MORNING, ONE AT NOON, AND TWO AT BEDTIME 120 capsule 5   • HYDROcodone-acetaminophen (NORCO) 5-325 MG per tablet Take one po bid    Prn  Moderate pain 60 tablet 0   • HYDROcodone-acetaminophen (NORCO) 5-325 MG per tablet TAKE ONE TABLET every 12 hours BY MOUTH FOR PAIN 60 tablet 0   • insulin aspart (novoLOG) 100 UNIT/ML injection Inject 15 Units under the skin  "into the appropriate area as directed Daily With Breakfast. 15 units with breakfast 10 units with lunch 10 units with dinner     • Insulin Degludec (TRESIBA FLEXTOUCH) 200 UNIT/ML solution pen-injector Inject 54 Units under the skin into the appropriate area as directed every night at bedtime. 9 mL 11   • levothyroxine (SYNTHROID, LEVOTHROID) 88 MCG tablet TAKE ONE TABLET BY MOUTH EVERY DAY 90 tablet 1   • midodrine (PROAMATINE) 2.5 MG tablet Take 1 tablet by mouth 3 (Three) Times a Day. 90 tablet 3   • Multiple Vitamins-Minerals (CENTRUM SILVER PO) Take  by mouth Daily.     • Needle, Disp, (BD DISP NEEDLES) 30G X 1/2\" misc To be used 3 times daily with Novolog Flexpen. 100 each 5   • NYSTATIN 181263 UNIT/GM powder APPLY TOPICALLY 3 TIMES A DAY 60 g 2   • O2 (OXYGEN) Inhale 2 L/min Every Night. Uses 2L  overnight only     • omeprazole (priLOSEC) 40 MG capsule TAKE ONE CAPSULE BY MOUTH EVERY DAY 90 capsule 1   • potassium chloride (K-DUR) 10 MEQ CR tablet TAKE ONE (1) TABLET ORALLY (BY MOUTH) ONCE DAILY 90 tablet 2   • promethazine (PHENERGAN) 12.5 MG tablet TABLET ONE-HALF TABLET TO ONE TABLET BY MOUTH EVERY 6 HOURS AS NEEDED FOR NAUSEA 20 tablet 0   • cephalexin (KEFLEX) 500 MG capsule Take 1 capsule by mouth 2 (Two) Times a Day. 14 capsule 0   • insulin aspart (novoLOG FLEXPEN) 100 UNIT/ML solution pen-injector sc pen Inject 54 Units under the skin into the appropriate area as directed Every Morning Before Breakfast. 30 units with breakfast 35 units with lunch 35 units with dinner        No facility-administered medications prior to visit.      No orders of the defined types were placed in this encounter.    [unfilled]  Medications Discontinued During This Encounter   Medication Reason   • cephalexin (KEFLEX) 500 MG capsule *Therapy completed   • insulin aspart (novoLOG FLEXPEN) 100 UNIT/ML solution pen-injector sc pen          Return in about 4 weeks (around 8/13/2019) for Recheck.  "

## 2019-07-17 RX ORDER — MIDODRINE HYDROCHLORIDE 2.5 MG/1
2.5 TABLET ORAL 3 TIMES DAILY
Qty: 90 TABLET | Refills: 6 | Status: SHIPPED | OUTPATIENT
Start: 2019-07-17 | End: 2020-02-18

## 2019-07-18 ENCOUNTER — APPOINTMENT (OUTPATIENT)
Dept: LAB | Facility: HOSPITAL | Age: 79
End: 2019-07-18

## 2019-07-18 ENCOUNTER — INFUSION (OUTPATIENT)
Dept: ONCOLOGY | Facility: HOSPITAL | Age: 79
End: 2019-07-18

## 2019-07-18 ENCOUNTER — APPOINTMENT (OUTPATIENT)
Dept: ONCOLOGY | Facility: HOSPITAL | Age: 79
End: 2019-07-18

## 2019-07-18 ENCOUNTER — LAB (OUTPATIENT)
Dept: LAB | Facility: HOSPITAL | Age: 79
End: 2019-07-18

## 2019-07-18 VITALS — TEMPERATURE: 98.2 F

## 2019-07-18 DIAGNOSIS — D46.C MYELODYSPLASTIC SYNDROME WITH 5Q DELETION (HCC): ICD-10-CM

## 2019-07-18 DIAGNOSIS — N18.30 CKD STAGE 3 DUE TO TYPE 2 DIABETES MELLITUS (HCC): ICD-10-CM

## 2019-07-18 DIAGNOSIS — D64.9 ANEMIA REQUIRING TRANSFUSIONS: Primary | ICD-10-CM

## 2019-07-18 DIAGNOSIS — E11.22 CKD STAGE 3 DUE TO TYPE 2 DIABETES MELLITUS (HCC): ICD-10-CM

## 2019-07-18 LAB
BASOPHILS # BLD AUTO: 0.13 10*3/MM3 (ref 0–0.2)
BASOPHILS NFR BLD AUTO: 1.9 % (ref 0–1.5)
DEPRECATED RDW RBC AUTO: 88 FL (ref 37–54)
EOSINOPHIL # BLD AUTO: 0.22 10*3/MM3 (ref 0–0.4)
EOSINOPHIL NFR BLD AUTO: 3.2 % (ref 0.3–6.2)
ERYTHROCYTE [DISTWIDTH] IN BLOOD BY AUTOMATED COUNT: 20.2 % (ref 12.3–15.4)
FERRITIN SERPL-MCNC: 492 NG/ML (ref 13–150)
HCT VFR BLD AUTO: 28.1 % (ref 34–46.6)
HGB BLD-MCNC: 8.8 G/DL (ref 12–15.9)
IMM GRANULOCYTES # BLD AUTO: 0.08 10*3/MM3 (ref 0–0.05)
IMM GRANULOCYTES NFR BLD AUTO: 1.2 % (ref 0–0.5)
IRON 24H UR-MRATE: 93 MCG/DL (ref 37–145)
IRON SATN MFR SERPL: 55 % (ref 20–50)
LYMPHOCYTES # BLD AUTO: 2.34 10*3/MM3 (ref 0.7–3.1)
LYMPHOCYTES NFR BLD AUTO: 34 % (ref 19.6–45.3)
MCH RBC QN AUTO: 37.3 PG (ref 26.6–33)
MCHC RBC AUTO-ENTMCNC: 31.3 G/DL (ref 31.5–35.7)
MCV RBC AUTO: 119.1 FL (ref 79–97)
MONOCYTES # BLD AUTO: 0.29 10*3/MM3 (ref 0.1–0.9)
MONOCYTES NFR BLD AUTO: 4.2 % (ref 5–12)
NEUTROPHILS # BLD AUTO: 3.82 10*3/MM3 (ref 1.7–7)
NEUTROPHILS NFR BLD AUTO: 55.5 % (ref 42.7–76)
PLATELET # BLD AUTO: 345 10*3/MM3 (ref 140–450)
PMV BLD AUTO: 11.5 FL (ref 6–12)
RBC # BLD AUTO: 2.36 10*6/MM3 (ref 3.77–5.28)
TIBC SERPL-MCNC: 168 MCG/DL (ref 298–536)
UIBC SERPL-MCNC: 75 MCG/DL (ref 112–346)
WBC NRBC COR # BLD: 6.88 10*3/MM3 (ref 3.4–10.8)

## 2019-07-18 PROCEDURE — 83550 IRON BINDING TEST: CPT | Performed by: INTERNAL MEDICINE

## 2019-07-18 PROCEDURE — 82728 ASSAY OF FERRITIN: CPT | Performed by: INTERNAL MEDICINE

## 2019-07-18 PROCEDURE — 85025 COMPLETE CBC W/AUTO DIFF WBC: CPT | Performed by: INTERNAL MEDICINE

## 2019-07-18 PROCEDURE — 83540 ASSAY OF IRON: CPT | Performed by: INTERNAL MEDICINE

## 2019-07-18 PROCEDURE — 36415 COLL VENOUS BLD VENIPUNCTURE: CPT

## 2019-07-18 PROCEDURE — 25010000002 EPOETIN ALFA PER 1000 UNITS: Performed by: INTERNAL MEDICINE

## 2019-07-18 PROCEDURE — 96372 THER/PROPH/DIAG INJ SC/IM: CPT

## 2019-07-18 RX ADMIN — ERYTHROPOIETIN 50000 UNITS: 20000 INJECTION, SOLUTION INTRAVENOUS; SUBCUTANEOUS at 10:04

## 2019-07-30 ENCOUNTER — TELEPHONE (OUTPATIENT)
Dept: INTERNAL MEDICINE | Facility: CLINIC | Age: 79
End: 2019-07-30

## 2019-07-30 ENCOUNTER — INFUSION (OUTPATIENT)
Dept: ONCOLOGY | Facility: HOSPITAL | Age: 79
End: 2019-07-30

## 2019-07-30 ENCOUNTER — LAB (OUTPATIENT)
Dept: LAB | Facility: HOSPITAL | Age: 79
End: 2019-07-30

## 2019-07-30 VITALS
SYSTOLIC BLOOD PRESSURE: 120 MMHG | OXYGEN SATURATION: 94 % | DIASTOLIC BLOOD PRESSURE: 69 MMHG | TEMPERATURE: 98.4 F | HEART RATE: 82 BPM

## 2019-07-30 DIAGNOSIS — N18.30 CKD STAGE 3 DUE TO TYPE 2 DIABETES MELLITUS (HCC): ICD-10-CM

## 2019-07-30 DIAGNOSIS — E11.22 CKD STAGE 3 DUE TO TYPE 2 DIABETES MELLITUS (HCC): ICD-10-CM

## 2019-07-30 DIAGNOSIS — D64.9 ANEMIA REQUIRING TRANSFUSIONS: Primary | ICD-10-CM

## 2019-07-30 DIAGNOSIS — D46.C MYELODYSPLASTIC SYNDROME WITH 5Q DELETION (HCC): ICD-10-CM

## 2019-07-30 LAB
BASOPHILS # BLD AUTO: 0.08 10*3/MM3 (ref 0–0.2)
BASOPHILS NFR BLD AUTO: 1.3 % (ref 0–1.5)
DEPRECATED RDW RBC AUTO: 81 FL (ref 37–54)
EOSINOPHIL # BLD AUTO: 0.23 10*3/MM3 (ref 0–0.4)
EOSINOPHIL NFR BLD AUTO: 3.7 % (ref 0.3–6.2)
ERYTHROCYTE [DISTWIDTH] IN BLOOD BY AUTOMATED COUNT: 19.3 % (ref 12.3–15.4)
HCT VFR BLD AUTO: 25 % (ref 34–46.6)
HGB BLD-MCNC: 8.3 G/DL (ref 12–15.9)
IMM GRANULOCYTES # BLD AUTO: 0.08 10*3/MM3 (ref 0–0.05)
IMM GRANULOCYTES NFR BLD AUTO: 1.3 % (ref 0–0.5)
LYMPHOCYTES # BLD AUTO: 2.22 10*3/MM3 (ref 0.7–3.1)
LYMPHOCYTES NFR BLD AUTO: 35.4 % (ref 19.6–45.3)
MCH RBC QN AUTO: 38.2 PG (ref 26.6–33)
MCHC RBC AUTO-ENTMCNC: 33.2 G/DL (ref 31.5–35.7)
MCV RBC AUTO: 115.2 FL (ref 79–97)
MONOCYTES # BLD AUTO: 0.3 10*3/MM3 (ref 0.1–0.9)
MONOCYTES NFR BLD AUTO: 4.8 % (ref 5–12)
NEUTROPHILS # BLD AUTO: 3.37 10*3/MM3 (ref 1.7–7)
NEUTROPHILS NFR BLD AUTO: 53.5 % (ref 42.7–76)
NRBC BLD AUTO-RTO: 0 /100 WBC (ref 0–0.2)
PLATELET # BLD AUTO: 411 10*3/MM3 (ref 140–450)
PMV BLD AUTO: 11.5 FL (ref 6–12)
RBC # BLD AUTO: 2.17 10*6/MM3 (ref 3.77–5.28)
WBC NRBC COR # BLD: 6.28 10*3/MM3 (ref 3.4–10.8)

## 2019-07-30 PROCEDURE — 36415 COLL VENOUS BLD VENIPUNCTURE: CPT | Performed by: INTERNAL MEDICINE

## 2019-07-30 PROCEDURE — 85025 COMPLETE CBC W/AUTO DIFF WBC: CPT | Performed by: INTERNAL MEDICINE

## 2019-07-30 PROCEDURE — 96372 THER/PROPH/DIAG INJ SC/IM: CPT

## 2019-07-30 PROCEDURE — 25010000002 EPOETIN ALFA PER 1000 UNITS: Performed by: INTERNAL MEDICINE

## 2019-07-30 RX ADMIN — ERYTHROPOIETIN 60000 UNITS: 40000 INJECTION, SOLUTION INTRAVENOUS; SUBCUTANEOUS at 09:58

## 2019-07-30 NOTE — TELEPHONE ENCOUNTER
Winter Perez [place] nurse, called from pt home.   Pt is very dizzy,  Hemoglobin is  8.2.  They are  Requesting  Meclizine for pt

## 2019-07-30 NOTE — TELEPHONE ENCOUNTER
No meclizine.  What is pt's glucose running?  She feels dizzy when glucoses up.  Also hgb down to 8.3. Pt needs to touch base with dr. Headley.  She has myelodysplastic syndrome and plan to transfuse if hgb below 8.

## 2019-08-01 ENCOUNTER — APPOINTMENT (OUTPATIENT)
Dept: ONCOLOGY | Facility: HOSPITAL | Age: 79
End: 2019-08-01

## 2019-08-01 ENCOUNTER — APPOINTMENT (OUTPATIENT)
Dept: LAB | Facility: HOSPITAL | Age: 79
End: 2019-08-01

## 2019-08-02 ENCOUNTER — OFFICE VISIT (OUTPATIENT)
Dept: INTERNAL MEDICINE | Facility: CLINIC | Age: 79
End: 2019-08-02

## 2019-08-02 VITALS
HEART RATE: 84 BPM | WEIGHT: 210.4 LBS | OXYGEN SATURATION: 94 % | RESPIRATION RATE: 18 BRPM | SYSTOLIC BLOOD PRESSURE: 138 MMHG | DIASTOLIC BLOOD PRESSURE: 52 MMHG | BODY MASS INDEX: 38.48 KG/M2

## 2019-08-02 DIAGNOSIS — D46.C MYELODYSPLASTIC SYNDROME WITH 5Q DELETION (HCC): Primary | ICD-10-CM

## 2019-08-02 DIAGNOSIS — E11.40 TYPE 2 DIABETES MELLITUS WITH DIABETIC NEUROPATHY, WITH LONG-TERM CURRENT USE OF INSULIN (HCC): ICD-10-CM

## 2019-08-02 DIAGNOSIS — Z86.718 HISTORY OF DEEP VENOUS THROMBOSIS (DVT) OF DISTAL VEIN OF LEFT LOWER EXTREMITY: ICD-10-CM

## 2019-08-02 DIAGNOSIS — Z79.4 TYPE 2 DIABETES MELLITUS WITH DIABETIC NEUROPATHY, WITH LONG-TERM CURRENT USE OF INSULIN (HCC): ICD-10-CM

## 2019-08-02 DIAGNOSIS — E11.22 CKD STAGE 3 DUE TO TYPE 2 DIABETES MELLITUS (HCC): ICD-10-CM

## 2019-08-02 DIAGNOSIS — N18.30 CKD STAGE 3 DUE TO TYPE 2 DIABETES MELLITUS (HCC): ICD-10-CM

## 2019-08-02 DIAGNOSIS — I10 ESSENTIAL HYPERTENSION: ICD-10-CM

## 2019-08-02 DIAGNOSIS — I50.32 CHRONIC DIASTOLIC CHF (CONGESTIVE HEART FAILURE) (HCC): ICD-10-CM

## 2019-08-02 DIAGNOSIS — I95.1 ORTHOSTATIC HYPOTENSION: ICD-10-CM

## 2019-08-02 LAB
GLUCOSE BLDC GLUCOMTR-MCNC: 148 MG/DL (ref 70–130)
HGB BLDA-MCNC: 8.2 G/DL (ref 12–17)

## 2019-08-02 PROCEDURE — 99214 OFFICE O/P EST MOD 30 MIN: CPT | Performed by: INTERNAL MEDICINE

## 2019-08-02 PROCEDURE — 82962 GLUCOSE BLOOD TEST: CPT | Performed by: INTERNAL MEDICINE

## 2019-08-02 PROCEDURE — 85018 HEMOGLOBIN: CPT | Performed by: INTERNAL MEDICINE

## 2019-08-02 NOTE — PROGRESS NOTES
Joya Singh is a 79 y.o. female, who presents with a chief complaint of   Chief Complaint   Patient presents with   • Diabetes       HPI   Pt here bc she doesn't feel well.  Her weight is up 10 pounds.  She was recently on vacation.  She has grade I systolic dysfunction with normal systolic function on echo last year.  BP up some today.  She doesn't check bp at home.  She is on lasix 20mg daily at baseline. She has felt a little short of breath.  She has chonic dizziness.      Myelodysplastic syndrome - hgb trending back down.  Last transfusion about 4 months ago.  She has been getting large doses of epogen.  The goal is to keep hgb above 8.  Her next appt with dr. Headley is on 8/14.      Uncontrolled DM - glucoses in am 120-200 in am.  She is trying to eat better now that she is back from vacation.      She does have left leg swelling.  She has had dvt's in past and also had 2 recent dopplers neg for dvt.  She denies pin in her leg.  She went on a trip [place] and came back early in July and had a neg doppler on 7/16.  Since then she has been to [place] and still has leg swelling.  She knows she ate a lot of salt on vacation .    The following portions of the patient's history were reviewed and updated as appropriate: allergies, current medications, past family history, past medical history, past social history, past surgical history and problem list.    Allergies: Baclofen; Codeine; Lisinopril; Morphine; and Penicillins    Review of Systems   Constitutional: Positive for fatigue.   HENT: Negative.    Eyes: Negative.    Respiratory: Negative.    Cardiovascular: Positive for leg swelling.   Gastrointestinal: Negative.    Endocrine: Negative.    Genitourinary: Negative.    Musculoskeletal: Negative.    Skin: Negative.    Allergic/Immunologic: Negative.    Neurological: Positive for dizziness.   Hematological: Negative.    Psychiatric/Behavioral: Negative.    All other systems reviewed and are negative.             Wt Readings from Last 3 Encounters:   08/02/19 95.4 kg (210 lb 6.4 oz)   07/05/19 91.2 kg (201 lb)   06/25/19 90.6 kg (199 lb 11.2 oz)     Temp Readings from Last 3 Encounters:   07/30/19 98.4 °F (36.9 °C)   07/18/19 98.2 °F (36.8 °C)   07/16/19 98.9 °F (37.2 °C)     BP Readings from Last 3 Encounters:   08/02/19 138/52   07/30/19 120/69   07/16/19 116/48     Pulse Readings from Last 3 Encounters:   08/02/19 84   07/30/19 82   07/16/19 89     Body mass index is 38.48 kg/m².  @LASTSAO2(3)@    Physical Exam   Constitutional: She is oriented to person, place, and time. She appears well-developed and well-nourished. No distress.   HENT:   Head: Normocephalic and atraumatic.   Right Ear: External ear normal.   Left Ear: External ear normal.   Nose: Nose normal.   Mouth/Throat: Oropharynx is clear and moist.   Eyes: Conjunctivae and EOM are normal. Pupils are equal, round, and reactive to light.   Neck: Normal range of motion. Neck supple.   Cardiovascular: Normal rate, regular rhythm, normal heart sounds and intact distal pulses.   Pulmonary/Chest: Effort normal and breath sounds normal. No respiratory distress. She has no wheezes.   Musculoskeletal: Normal range of motion. She exhibits edema.   Normal gait   Neurological: She is alert and oriented to person, place, and time.   Skin: Skin is warm and dry.   Psychiatric: She has a normal mood and affect. Her behavior is normal. Judgment and thought content normal.   Nursing note and vitals reviewed.      Results for orders placed or performed in visit on 08/02/19   POCT Glucose   Result Value Ref Range    Glucose 148 (A) 70 - 130 mg/dL   POCT hemoglobin   Result Value Ref Range    Hemoglobin 8.2 (A) 12.0 - 17.0 g/dL           Joya was seen today for diabetes.    Diagnoses and all orders for this visit:    Myelodysplastic syndrome with 5q deletion (CMS/H[facility]) - hgb trending down.  Will monitor closely.  May need another blood transfusion soon.   -     POCT  hemoglobin    Chronic diastolic CHF - pt with more bilat LE swelling.  Double lasix to 40mg daily until next Wednesday (x 5 days).  Then recheck.       Type 2 diabetes mellitus with diabetic neuropathy, with long-term current use of insulin (CMS/[facility])  -     POCT Glucose  -     Comprehensive Metabolic Panel    History of deep venous thrombosis (DVT) of distal vein of left lower extremity - Labs today.  If d-dimer positive will recheck doppler.  Clinical sx and physical exam essentially the same as at last visit when dopplers neg.  Discussed plan including rba with patient who voiced understanding.    -     D-dimer, Quantitative    Essential hypertension  -     Comprehensive Metabolic Panel    Orthostatic hypotension    CKD stage 3 due to type 2 diabetes mellitus (CMS/[facility])  -     Comprehensive Metabolic Panel          Outpatient Medications Prior to Visit   Medication Sig Dispense Refill   • ACCU-CHEK FASTCLIX LANCETS misc TEST 3-4 TIMES DAILY AS DIRECTED 400 each 3   • ACCU-CHEK SMARTVIEW test strip TEST BLOOD SUGAR THREE TIMES DAILY OR AS DIRECTED 300 each 3   • acetaminophen (TYLENOL) 325 MG tablet Take 2 tablets by mouth Every 6 (Six) Hours As Needed for Mild Pain . OTC product 40 tablet 0   • albuterol sulfate  (90 Base) MCG/ACT inhaler Inhale 2 puffs Every 4 (Four) Hours As Needed for Wheezing. 1 inhaler 3   • Alcohol Swabs (B-D SINGLE USE SWABS REGULAR) pads      • atorvastatin (LIPITOR) 10 MG tablet TAKE ONE TABLET BY MOUTH AT BEDTIME 90 tablet 1   • benzonatate (TESSALON) 200 MG capsule TAKE ONE CAPSULE BY MOUTH THREE TIMES DAILY AS NEEDED FOR COUGH 20 capsule 0   • Blood Glucose Monitoring Suppl (ACCU-CHEK KENNETH SMARTVIEW) w/Device kit TEST blood sugar three times daily or as directed 1 kit 0   • Calcium Carbonate-Vit D-Min (CALCIUM 1200) 3064-8728 MG-UNIT chewable tablet Chew 1 tablet Daily.     • cyclobenzaprine (FLEXERIL) 10 MG tablet TAKE ONE TABLET BY MOUTH TWICE DAILY as needed for muscle spasms  "30 tablet 0   • diphenhydrAMINE (BENADRYL) 25 mg capsule Take 25 mg by mouth Every 6 (Six) Hours As Needed for Itching.     • DULoxetine (CYMBALTA) 30 MG capsule TAKE ONE CAPSULE BY MOUTH TWICE DAILY 60 capsule 5   • fluticasone (FLONASE) 50 MCG/ACT nasal spray 2 sprays into the nostril(s) as directed by provider Daily. 16 g 0   • furosemide (LASIX) 20 MG tablet TAKE ONE (1) TABLET ORALLY (BY MOUTH) ONCE DAILY 90 tablet 1   • gabapentin (NEURONTIN) 400 MG capsule TAKE ONE CAPSULE BY MOUTH EVERY MORNING, ONE AT NOON, AND TWO AT BEDTIME 120 capsule 5   • HYDROcodone-acetaminophen (NORCO) 5-325 MG per tablet Take one po bid    Prn  Moderate pain 60 tablet 0   • HYDROcodone-acetaminophen (NORCO) 5-325 MG per tablet TAKE ONE TABLET every 12 hours BY MOUTH FOR PAIN 60 tablet 0   • insulin aspart (novoLOG) 100 UNIT/ML injection Inject 15 Units under the skin into the appropriate area as directed Daily With Breakfast. 15 units with breakfast 10 units with lunch 10 units with dinner     • Insulin Degludec (TRESIBA FLEXTOUCH) 200 UNIT/ML solution pen-injector Inject 54 Units under the skin into the appropriate area as directed every night at bedtime. 9 mL 11   • levothyroxine (SYNTHROID, LEVOTHROID) 88 MCG tablet TAKE ONE TABLET BY MOUTH EVERY DAY 90 tablet 1   • midodrine (PROAMATINE) 2.5 MG tablet Take 1 tablet by mouth 3 (Three) Times a Day. 90 tablet 6   • Multiple Vitamins-Minerals (CENTRUM SILVER PO) Take  by mouth Daily.     • Needle, Disp, (BD DISP NEEDLES) 30G X 1/2\" misc To be used 3 times daily with Novolog Flexpen. 100 each 5   • NOVOLOG FLEXPEN 100 UNIT/ML solution pen-injector sc pen INJECT 20 UNITS UNDER THE SKIN BEFORE BREAKFAST THEN 25 UNITS BEFORE LUNCH AND DINNER 15 mL 5   • NYSTATIN [id number] UNIT/GM powder APPLY TOPICALLY 3 TIMES A DAY 60 g 2   • O2 (OXYGEN) Inhale 2 L/min Every Night. Uses 2L  overnight only     • omeprazole (priLOSEC) 40 MG capsule TAKE ONE CAPSULE BY MOUTH EVERY DAY 90 capsule 1   • " potassium chloride (K-DUR) 10 MEQ CR tablet TAKE ONE (1) TABLET ORALLY (BY MOUTH) ONCE DAILY 90 tablet 2   • promethazine (PHENERGAN) 12.5 MG tablet TABLET ONE-HALF TABLET TO ONE TABLET BY MOUTH EVERY 6 HOURS AS NEEDED FOR NAUSEA 20 tablet 0     No facility-administered medications prior to visit.      No orders of the defined types were placed in this encounter.    [unfilled]  There are no discontinued medications.      Return in about 1 week (around 8/9/2019) for Recheck - recheck next wednesday.

## 2019-08-03 LAB
ALBUMIN SERPL-MCNC: 3.9 G/DL (ref 3.5–5.2)
ALBUMIN/GLOB SERPL: 1.6 G/DL
ALP SERPL-CCNC: 115 U/L (ref 39–117)
ALT SERPL-CCNC: 5 U/L (ref 1–33)
AST SERPL-CCNC: 11 U/L (ref 1–32)
BILIRUB SERPL-MCNC: 0.4 MG/DL (ref 0.2–1.2)
BUN SERPL-MCNC: 21 MG/DL (ref 8–23)
BUN/CREAT SERPL: 11.7 (ref 7–25)
CALCIUM SERPL-MCNC: 8.9 MG/DL (ref 8.6–10.5)
CHLORIDE SERPL-SCNC: 104 MMOL/L (ref 98–107)
CO2 SERPL-SCNC: 29.1 MMOL/L (ref 22–29)
CREAT SERPL-MCNC: 1.8 MG/DL (ref 0.57–1)
D DIMER PPP FEU-MCNC: 0.69 MCGFEU/ML (ref 0–0.46)
GLOBULIN SER CALC-MCNC: 2.4 GM/DL
GLUCOSE SERPL-MCNC: 129 MG/DL (ref 65–99)
POTASSIUM SERPL-SCNC: 5 MMOL/L (ref 3.5–5.2)
PROT SERPL-MCNC: 6.3 G/DL (ref 6–8.5)
SODIUM SERPL-SCNC: 144 MMOL/L (ref 136–145)

## 2019-08-05 ENCOUNTER — HOSPITAL ENCOUNTER (OUTPATIENT)
Dept: ULTRASOUND IMAGING | Facility: HOSPITAL | Age: 79
Discharge: HOME | End: 2019-08-05
Admitting: INTERNAL MEDICINE

## 2019-08-05 ENCOUNTER — HOSPITAL ENCOUNTER (OUTPATIENT)
Dept: GENERAL RADIOLOGY | Facility: HOSPITAL | Age: 79
Discharge: HOME | End: 2019-08-05

## 2019-08-05 ENCOUNTER — TELEPHONE (OUTPATIENT)
Dept: INTERNAL MEDICINE | Facility: CLINIC | Age: 79
End: 2019-08-05

## 2019-08-05 ENCOUNTER — HOSPITAL ENCOUNTER (OUTPATIENT)
Dept: NUCLEAR MEDICINE | Facility: HOSPITAL | Age: 79
Discharge: HOME | End: 2019-08-05

## 2019-08-05 DIAGNOSIS — M79.89 SWELLING OF THIGH: ICD-10-CM

## 2019-08-05 DIAGNOSIS — R79.89 ELEVATED D-DIMER: ICD-10-CM

## 2019-08-05 DIAGNOSIS — Z86.718 HISTORY OF DEEP VENOUS THROMBOSIS (DVT) OF DISTAL VEIN OF LEFT LOWER EXTREMITY: ICD-10-CM

## 2019-08-05 DIAGNOSIS — Z86.718 HISTORY OF DEEP VENOUS THROMBOSIS (DVT) OF DISTAL VEIN OF LEFT LOWER EXTREMITY: Primary | ICD-10-CM

## 2019-08-05 PROCEDURE — 0 TECHNETIUM ALBUMIN AGGREGATED: Performed by: INTERNAL MEDICINE

## 2019-08-05 PROCEDURE — 71046 X-RAY EXAM CHEST 2 VIEWS: CPT

## 2019-08-05 PROCEDURE — 78582 LUNG VENTILAT&PERFUS IMAGING: CPT

## 2019-08-05 PROCEDURE — A9540 TC99M MAA: HCPCS | Performed by: INTERNAL MEDICINE

## 2019-08-05 PROCEDURE — 0 TECHNETIUM TC 99M PENTETATE KIT: Performed by: INTERNAL MEDICINE

## 2019-08-05 PROCEDURE — A9539 TC99M PENTETATE: HCPCS | Performed by: INTERNAL MEDICINE

## 2019-08-05 PROCEDURE — 93970 EXTREMITY STUDY: CPT

## 2019-08-05 RX ADMIN — Medication 1 DOSE: at 13:05

## 2019-08-05 RX ADMIN — KIT FOR THE PREPARATION OF TECHNETIUM TC 99M PENTETATE 1 DOSE: 20 INJECTION, POWDER, LYOPHILIZED, FOR SOLUTION INTRAVENOUS; RESPIRATORY (INHALATION) at 13:05

## 2019-08-05 NOTE — TELEPHONE ENCOUNTER
Unable to reach patient, oscar with referrals is attempting to contact too    ----- Message from Chari Mercado MD sent at 8/5/2019 10:26 AM EDT -----  Orders for vq scan and LE dopplers placed.  pt with gfr 27 so we can't do CT PE protocol.

## 2019-08-06 ENCOUNTER — TELEPHONE (OUTPATIENT)
Dept: INTERNAL MEDICINE | Facility: CLINIC | Age: 79
End: 2019-08-06

## 2019-08-06 NOTE — TELEPHONE ENCOUNTER
----- Message from Chari Mercado MD sent at 8/5/2019  4:00 PM EDT -----  Call pt about testing.  Testing low probability for blood clot in lung

## 2019-08-06 NOTE — TELEPHONE ENCOUNTER
----- Message from Chari Mercado MD sent at 8/5/2019  4:01 PM EDT -----  Call pt about cxr - no acute findings

## 2019-08-07 ENCOUNTER — OFFICE VISIT (OUTPATIENT)
Dept: INTERNAL MEDICINE | Facility: CLINIC | Age: 79
End: 2019-08-07

## 2019-08-07 VITALS
HEART RATE: 75 BPM | DIASTOLIC BLOOD PRESSURE: 50 MMHG | SYSTOLIC BLOOD PRESSURE: 100 MMHG | BODY MASS INDEX: 37.91 KG/M2 | HEIGHT: 62 IN | TEMPERATURE: 98.1 F | WEIGHT: 206 LBS | RESPIRATION RATE: 22 BRPM | OXYGEN SATURATION: 97 %

## 2019-08-07 DIAGNOSIS — I95.1 ORTHOSTATIC HYPOTENSION: ICD-10-CM

## 2019-08-07 DIAGNOSIS — Z79.4 TYPE 2 DIABETES MELLITUS WITH DIABETIC NEUROPATHY, WITH LONG-TERM CURRENT USE OF INSULIN (HCC): ICD-10-CM

## 2019-08-07 DIAGNOSIS — E11.40 TYPE 2 DIABETES MELLITUS WITH DIABETIC NEUROPATHY, WITH LONG-TERM CURRENT USE OF INSULIN (HCC): ICD-10-CM

## 2019-08-07 DIAGNOSIS — N18.30 CKD STAGE 3 DUE TO TYPE 2 DIABETES MELLITUS (HCC): ICD-10-CM

## 2019-08-07 DIAGNOSIS — I10 ESSENTIAL HYPERTENSION: ICD-10-CM

## 2019-08-07 DIAGNOSIS — I50.32 CHRONIC DIASTOLIC CHF (CONGESTIVE HEART FAILURE) (HCC): ICD-10-CM

## 2019-08-07 DIAGNOSIS — D46.C MYELODYSPLASTIC SYNDROME WITH 5Q DELETION (HCC): Primary | ICD-10-CM

## 2019-08-07 DIAGNOSIS — Z86.718 HISTORY OF DEEP VENOUS THROMBOSIS (DVT) OF DISTAL VEIN OF LEFT LOWER EXTREMITY: ICD-10-CM

## 2019-08-07 DIAGNOSIS — R42 DIZZINESS: ICD-10-CM

## 2019-08-07 DIAGNOSIS — F33.1 MODERATE EPISODE OF RECURRENT MAJOR DEPRESSIVE DISORDER (HCC): ICD-10-CM

## 2019-08-07 DIAGNOSIS — E11.22 CKD STAGE 3 DUE TO TYPE 2 DIABETES MELLITUS (HCC): ICD-10-CM

## 2019-08-07 LAB — HGB BLDA-MCNC: 8.7 G/DL (ref 12–17)

## 2019-08-07 PROCEDURE — 85018 HEMOGLOBIN: CPT | Performed by: INTERNAL MEDICINE

## 2019-08-07 PROCEDURE — 99214 OFFICE O/P EST MOD 30 MIN: CPT | Performed by: INTERNAL MEDICINE

## 2019-08-07 NOTE — PROGRESS NOTES
Joya Singh is a 79 y.o. female, who presents with a chief complaint of   Chief Complaint   Patient presents with   • Anemia       HPI   Pt here for follow up. She had left leg pain and swelling. D-dimer positive.  Dopplers and vq scan neg for clots.  She has been on increased lasix and weight down about 4 pounds.  bp on low side today.      Pt feels like her chronic dizziness is getting worse.  She has problems sitting or standing.  She sleeps in her recliner bc she doesn't feel dizzy sitting there.  She has cindi and just wears oxygen at night.  She has brain imaging done and been to neurology and ent in past about sx.  Pt also w/ hx orthostasis.  She is anemic and struggles with glucose control.      Myelodysplastic syndrome - hgb hovering around 8.  Pt has f/u with dr. Headley next week.     DM - glucoses up and down. Pt says she is trying to control diet better.     The following portions of the patient's history were reviewed and updated as appropriate: allergies, current medications, past family history, past medical history, past social history, past surgical history and problem list.    Allergies: Baclofen; Codeine; Lisinopril; Morphine; and Penicillins    Review of Systems   Constitutional: Negative.    HENT: Negative.    Eyes: Negative.    Respiratory: Negative.    Cardiovascular: Negative.    Gastrointestinal: Negative.    Endocrine: Negative.    Genitourinary: Negative.    Musculoskeletal: Negative.    Skin: Negative.    Allergic/Immunologic: Negative.    Neurological: Positive for dizziness.   Hematological: Negative.    Psychiatric/Behavioral: The patient is nervous/anxious.    All other systems reviewed and are negative.            Wt Readings from Last 3 Encounters:   08/07/19 93.4 kg (206 lb)   08/02/19 95.4 kg (210 lb 6.4 oz)   07/05/19 91.2 kg (201 lb)     Temp Readings from Last 3 Encounters:   08/07/19 98.1 °F (36.7 °C)   07/30/19 98.4 °F (36.9 °C)   07/18/19 98.2 °F (36.8 °C)     BP Readings  from Last 3 Encounters:   08/07/19 100/50   08/02/19 138/52   07/30/19 120/69     Pulse Readings from Last 3 Encounters:   08/07/19 75   08/02/19 84   07/30/19 82     Body mass index is 37.68 kg/m².  @LASTSAO2(3)@    Physical Exam   Constitutional: She is oriented to person, place, and time. She appears well-developed and well-nourished. No distress.   HENT:   Head: Normocephalic and atraumatic.   Right Ear: External ear normal.   Left Ear: External ear normal.   Nose: Nose normal.   Mouth/Throat: Oropharynx is clear and moist.   Eyes: Conjunctivae and EOM are normal. Pupils are equal, round, and reactive to light.   Neck: Normal range of motion. Neck supple.   Cardiovascular: Normal rate, regular rhythm, normal heart sounds and intact distal pulses.   Pulmonary/Chest: Effort normal and breath sounds normal. No respiratory distress. She has no wheezes.   Musculoskeletal: Normal range of motion.   Walks with cane  Trace bilat le edema   Neurological: She is alert and oriented to person, place, and time.   Skin: Skin is warm and dry.   Psychiatric: She has a normal mood and affect. Her behavior is normal. Judgment and thought content normal.   Nursing note and vitals reviewed.      Results for orders placed or performed in visit on 08/07/19   POCT hemoglobin   Result Value Ref Range    Hemoglobin 8.7 (A) 12.0 - 17.0 g/dL           Joya was seen today for anemia.    Diagnoses and all orders for this visit:    Myelodysplastic syndrome with 5q deletion (CMS/[facility])    History of deep venous thrombosis (DVT) of distal vein of left lower extremity  -     POCT hemoglobin    Type 2 diabetes mellitus with diabetic neuropathy, with long-term current use of insulin (CMS/[facility])    Essential hypertension    Orthostatic hypotension    CKD stage 3 due to type 2 diabetes mellitus (CMS/[facility])    Chronic diastolic CHF (congestive heart failure) (CMS/[facility])    Moderate episode of recurrent major depressive disorder  (CMS/[facility])    Dizziness    decrease lasix back to 20mg daily.  Monitor daily weights.  If weight up by 5lbs in 1 week or 3lbs in 1 day pt to call our office.     Cont current meds.  Keep f/u appt with dr. Headley next week.     Outpatient Medications Prior to Visit   Medication Sig Dispense Refill   • ACCU-CHEK FASTCLIX LANCETS misc TEST 3-4 TIMES DAILY AS DIRECTED 400 each 3   • ACCU-CHEK SMARTVIEW test strip TEST BLOOD SUGAR THREE TIMES DAILY OR AS DIRECTED 300 each 3   • acetaminophen (TYLENOL) 325 MG tablet Take 2 tablets by mouth Every 6 (Six) Hours As Needed for Mild Pain . OTC product 40 tablet 0   • albuterol sulfate  (90 Base) MCG/ACT inhaler Inhale 2 puffs Every 4 (Four) Hours As Needed for Wheezing. 1 inhaler 3   • Alcohol Swabs (B-D SINGLE USE SWABS REGULAR) pads      • atorvastatin (LIPITOR) 10 MG tablet TAKE ONE TABLET BY MOUTH AT BEDTIME 90 tablet 1   • benzonatate (TESSALON) 200 MG capsule TAKE ONE CAPSULE BY MOUTH THREE TIMES DAILY AS NEEDED FOR COUGH 20 capsule 0   • Blood Glucose Monitoring Suppl (ACCU-CHEK KENNETH SMARTVIEW) w/Device kit TEST blood sugar three times daily or as directed 1 kit 0   • Calcium Carbonate-Vit D-Min (CALCIUM 1200) 9434-6129 MG-UNIT chewable tablet Chew 1 tablet Daily.     • cyclobenzaprine (FLEXERIL) 10 MG tablet TAKE ONE TABLET BY MOUTH TWICE DAILY as needed for muscle spasms 30 tablet 0   • diphenhydrAMINE (BENADRYL) 25 mg capsule Take 25 mg by mouth Every 6 (Six) Hours As Needed for Itching.     • DULoxetine (CYMBALTA) 30 MG capsule TAKE ONE CAPSULE BY MOUTH TWICE DAILY 60 capsule 5   • fluticasone (FLONASE) 50 MCG/ACT nasal spray 2 sprays into the nostril(s) as directed by provider Daily. 16 g 0   • furosemide (LASIX) 20 MG tablet TAKE ONE (1) TABLET ORALLY (BY MOUTH) ONCE DAILY 90 tablet 1   • gabapentin (NEURONTIN) 400 MG capsule TAKE ONE CAPSULE BY MOUTH EVERY MORNING, ONE AT NOON, AND TWO AT BEDTIME 120 capsule 5   • HYDROcodone-acetaminophen (NORCO) 5-325 MG  "per tablet Take one po bid    Prn  Moderate pain 60 tablet 0   • HYDROcodone-acetaminophen (NORCO) 5-325 MG per tablet TAKE ONE TABLET every 12 hours BY MOUTH FOR PAIN 60 tablet 0   • insulin aspart (novoLOG) 100 UNIT/ML injection Inject 15 Units under the skin into the appropriate area as directed Daily With Breakfast. 15 units with breakfast 10 units with lunch 10 units with dinner     • Insulin Degludec (TRESIBA FLEXTOUCH) 200 UNIT/ML solution pen-injector Inject 54 Units under the skin into the appropriate area as directed every night at bedtime. 9 mL 11   • levothyroxine (SYNTHROID, LEVOTHROID) 88 MCG tablet TAKE ONE TABLET BY MOUTH EVERY DAY 90 tablet 1   • midodrine (PROAMATINE) 2.5 MG tablet Take 1 tablet by mouth 3 (Three) Times a Day. 90 tablet 6   • Multiple Vitamins-Minerals (CENTRUM SILVER PO) Take  by mouth Daily.     • Needle, Disp, (BD DISP NEEDLES) 30G X 1/2\" misc To be used 3 times daily with Novolog Flexpen. 100 each 5   • NOVOLOG FLEXPEN 100 UNIT/ML solution pen-injector sc pen INJECT 20 UNITS UNDER THE SKIN BEFORE BREAKFAST THEN 25 UNITS BEFORE LUNCH AND DINNER 15 mL 5   • NYSTATIN 126669 UNIT/GM powder APPLY TOPICALLY 3 TIMES A DAY 60 g 2   • O2 (OXYGEN) Inhale 2 L/min Every Night. Uses 2L  overnight only     • omeprazole (priLOSEC) 40 MG capsule TAKE ONE CAPSULE BY MOUTH EVERY DAY 90 capsule 1   • potassium chloride (K-DUR) 10 MEQ CR tablet TAKE ONE (1) TABLET ORALLY (BY MOUTH) ONCE DAILY 90 tablet 2   • promethazine (PHENERGAN) 12.5 MG tablet TABLET ONE-HALF TABLET TO ONE TABLET BY MOUTH EVERY 6 HOURS AS NEEDED FOR NAUSEA 20 tablet 0     No facility-administered medications prior to visit.      No orders of the defined types were placed in this encounter.    [unfilled]  There are no discontinued medications.      Return in about 4 weeks (around 9/4/2019) for Recheck, labs.  "

## 2019-08-08 ENCOUNTER — INFUSION (OUTPATIENT)
Dept: ONCOLOGY | Facility: HOSPITAL | Age: 79
End: 2019-08-08

## 2019-08-08 ENCOUNTER — LAB (OUTPATIENT)
Dept: LAB | Facility: HOSPITAL | Age: 79
End: 2019-08-08

## 2019-08-08 ENCOUNTER — TELEPHONE (OUTPATIENT)
Dept: INTERNAL MEDICINE | Facility: CLINIC | Age: 79
End: 2019-08-08

## 2019-08-08 VITALS
OXYGEN SATURATION: 91 % | DIASTOLIC BLOOD PRESSURE: 72 MMHG | HEART RATE: 87 BPM | TEMPERATURE: 98.1 F | SYSTOLIC BLOOD PRESSURE: 127 MMHG

## 2019-08-08 DIAGNOSIS — N18.30 CKD STAGE 3 DUE TO TYPE 2 DIABETES MELLITUS (HCC): ICD-10-CM

## 2019-08-08 DIAGNOSIS — D64.9 ANEMIA REQUIRING TRANSFUSIONS: Primary | ICD-10-CM

## 2019-08-08 DIAGNOSIS — D46.C MYELODYSPLASTIC SYNDROME WITH 5Q DELETION (HCC): ICD-10-CM

## 2019-08-08 DIAGNOSIS — E11.22 CKD STAGE 3 DUE TO TYPE 2 DIABETES MELLITUS (HCC): ICD-10-CM

## 2019-08-08 LAB
BASOPHILS # BLD AUTO: 0.17 10*3/MM3 (ref 0–0.2)
BASOPHILS NFR BLD AUTO: 2.4 % (ref 0–1.5)
DEPRECATED RDW RBC AUTO: 80.3 FL (ref 37–54)
EOSINOPHIL # BLD AUTO: 0.37 10*3/MM3 (ref 0–0.4)
EOSINOPHIL NFR BLD AUTO: 5.3 % (ref 0.3–6.2)
ERYTHROCYTE [DISTWIDTH] IN BLOOD BY AUTOMATED COUNT: 19.7 % (ref 12.3–15.4)
HCT VFR BLD AUTO: 25.8 % (ref 34–46.6)
HGB BLD-MCNC: 8.5 G/DL (ref 12–15.9)
IMM GRANULOCYTES # BLD AUTO: 0.07 10*3/MM3 (ref 0–0.05)
IMM GRANULOCYTES NFR BLD AUTO: 1 % (ref 0–0.5)
LYMPHOCYTES # BLD AUTO: 2.51 10*3/MM3 (ref 0.7–3.1)
LYMPHOCYTES NFR BLD AUTO: 35.9 % (ref 19.6–45.3)
MCH RBC QN AUTO: 37.1 PG (ref 26.6–33)
MCHC RBC AUTO-ENTMCNC: 32.9 G/DL (ref 31.5–35.7)
MCV RBC AUTO: 112.7 FL (ref 79–97)
MONOCYTES # BLD AUTO: 0.29 10*3/MM3 (ref 0.1–0.9)
MONOCYTES NFR BLD AUTO: 4.1 % (ref 5–12)
NEUTROPHILS # BLD AUTO: 3.58 10*3/MM3 (ref 1.7–7)
NEUTROPHILS NFR BLD AUTO: 51.3 % (ref 42.7–76)
PLATELET # BLD AUTO: 421 10*3/MM3 (ref 140–450)
PMV BLD AUTO: 11.6 FL (ref 6–12)
RBC # BLD AUTO: 2.29 10*6/MM3 (ref 3.77–5.28)
WBC NRBC COR # BLD: 6.99 10*3/MM3 (ref 3.4–10.8)

## 2019-08-08 PROCEDURE — 85025 COMPLETE CBC W/AUTO DIFF WBC: CPT | Performed by: INTERNAL MEDICINE

## 2019-08-08 PROCEDURE — 36415 COLL VENOUS BLD VENIPUNCTURE: CPT

## 2019-08-08 PROCEDURE — 25010000002 EPOETIN ALFA PER 1000 UNITS: Performed by: NURSE PRACTITIONER

## 2019-08-08 PROCEDURE — 96372 THER/PROPH/DIAG INJ SC/IM: CPT | Performed by: NURSE PRACTITIONER

## 2019-08-08 RX ADMIN — ERYTHROPOIETIN 60000 UNITS: 40000 INJECTION, SOLUTION INTRAVENOUS; SUBCUTANEOUS at 09:36

## 2019-08-08 NOTE — TELEPHONE ENCOUNTER
8/8/19 contacted daughter-in-law, Mayra explaining that she was not listed on HIPPA form and those listed could reach out for additional information

## 2019-08-08 NOTE — TELEPHONE ENCOUNTER
----- Message from Beth Farley MA sent at 8/7/2019  1:46 PM EDT -----  Contact: Daughter-in-Law; Mayra      ----- Message -----  From: Heydi Jackson  Sent: 8/7/2019  11:37 AM  To: Izabella Brantley [facility]    [phone] - daughter-in-law    Calling to discuss today's visit and make sure patient has full understanding of what pcp discussed with her.

## 2019-08-08 NOTE — TELEPHONE ENCOUNTER
----- Message from Leticia Goldman MA sent at 8/7/2019  4:25 PM EDT -----  Contact: michael - daughter in law      ----- Message -----  From: Claudia Corea  Sent: 8/7/2019   3:15 PM  To: Izabella Brantley [facility]    Second request    She is asking that leticia call her back per previous message?     [phone]    Thanks

## 2019-08-08 NOTE — TELEPHONE ENCOUNTER
Unfortunately, the daughter in law is not on the patients HIPAA, so I cannot speak with her regarding her treatment. Unless I'm missing something?

## 2019-08-14 ENCOUNTER — LAB (OUTPATIENT)
Dept: LAB | Facility: HOSPITAL | Age: 79
End: 2019-08-14

## 2019-08-14 ENCOUNTER — INFUSION (OUTPATIENT)
Dept: ONCOLOGY | Facility: HOSPITAL | Age: 79
End: 2019-08-14

## 2019-08-14 ENCOUNTER — OFFICE VISIT (OUTPATIENT)
Dept: ONCOLOGY | Facility: CLINIC | Age: 79
End: 2019-08-14

## 2019-08-14 VITALS
WEIGHT: 207.6 LBS | TEMPERATURE: 97.5 F | SYSTOLIC BLOOD PRESSURE: 117 MMHG | DIASTOLIC BLOOD PRESSURE: 55 MMHG | HEART RATE: 85 BPM | BODY MASS INDEX: 38.2 KG/M2 | RESPIRATION RATE: 14 BRPM | HEIGHT: 62 IN | OXYGEN SATURATION: 93 %

## 2019-08-14 DIAGNOSIS — E11.22 CKD STAGE 3 DUE TO TYPE 2 DIABETES MELLITUS (HCC): ICD-10-CM

## 2019-08-14 DIAGNOSIS — D47.2 MONOCLONAL GAMMOPATHY OF UNKNOWN SIGNIFICANCE (MGUS): Primary | ICD-10-CM

## 2019-08-14 DIAGNOSIS — N18.30 CKD STAGE 3 DUE TO TYPE 2 DIABETES MELLITUS (HCC): ICD-10-CM

## 2019-08-14 DIAGNOSIS — D46.C MYELODYSPLASTIC SYNDROME WITH 5Q DELETION (HCC): ICD-10-CM

## 2019-08-14 DIAGNOSIS — D64.9 ANEMIA REQUIRING TRANSFUSIONS: Primary | ICD-10-CM

## 2019-08-14 LAB
ABO GROUP BLD: NORMAL
BASOPHILS # BLD AUTO: 0.11 10*3/MM3 (ref 0–0.2)
BASOPHILS NFR BLD AUTO: 1.6 % (ref 0–1.5)
BLD GP AB SCN SERPL QL: NEGATIVE
DEPRECATED RDW RBC AUTO: 78.3 FL (ref 37–54)
EOSINOPHIL # BLD AUTO: 0.35 10*3/MM3 (ref 0–0.4)
EOSINOPHIL NFR BLD AUTO: 4.9 % (ref 0.3–6.2)
ERYTHROCYTE [DISTWIDTH] IN BLOOD BY AUTOMATED COUNT: 19.2 % (ref 12.3–15.4)
HCT VFR BLD AUTO: 25.1 % (ref 34–46.6)
HGB BLD-MCNC: 8.3 G/DL (ref 12–15.9)
IMM GRANULOCYTES # BLD AUTO: 0.1 10*3/MM3 (ref 0–0.05)
IMM GRANULOCYTES NFR BLD AUTO: 1.4 % (ref 0–0.5)
LYMPHOCYTES # BLD AUTO: 2.5 10*3/MM3 (ref 0.7–3.1)
LYMPHOCYTES NFR BLD AUTO: 35.3 % (ref 19.6–45.3)
MCH RBC QN AUTO: 37.1 PG (ref 26.6–33)
MCHC RBC AUTO-ENTMCNC: 33.1 G/DL (ref 31.5–35.7)
MCV RBC AUTO: 112.1 FL (ref 79–97)
MONOCYTES # BLD AUTO: 0.32 10*3/MM3 (ref 0.1–0.9)
MONOCYTES NFR BLD AUTO: 4.5 % (ref 5–12)
NEUTROPHILS # BLD AUTO: 3.7 10*3/MM3 (ref 1.7–7)
NEUTROPHILS NFR BLD AUTO: 52.3 % (ref 42.7–76)
NRBC BLD AUTO-RTO: 0 /100 WBC (ref 0–0.2)
PLATELET # BLD AUTO: 429 10*3/MM3 (ref 140–450)
PMV BLD AUTO: 11.8 FL (ref 6–12)
RBC # BLD AUTO: 2.24 10*6/MM3 (ref 3.77–5.28)
RH BLD: POSITIVE
T&S EXPIRATION DATE: NORMAL
WBC NRBC COR # BLD: 7.08 10*3/MM3 (ref 3.4–10.8)

## 2019-08-14 PROCEDURE — 96372 THER/PROPH/DIAG INJ SC/IM: CPT

## 2019-08-14 PROCEDURE — 86901 BLOOD TYPING SEROLOGIC RH(D): CPT | Performed by: INTERNAL MEDICINE

## 2019-08-14 PROCEDURE — 99213 OFFICE O/P EST LOW 20 MIN: CPT | Performed by: INTERNAL MEDICINE

## 2019-08-14 PROCEDURE — 86850 RBC ANTIBODY SCREEN: CPT | Performed by: INTERNAL MEDICINE

## 2019-08-14 PROCEDURE — 36415 COLL VENOUS BLD VENIPUNCTURE: CPT

## 2019-08-14 PROCEDURE — 25010000002 EPOETIN ALFA PER 1000 UNITS: Performed by: INTERNAL MEDICINE

## 2019-08-14 PROCEDURE — 85025 COMPLETE CBC W/AUTO DIFF WBC: CPT | Performed by: INTERNAL MEDICINE

## 2019-08-14 PROCEDURE — 86900 BLOOD TYPING SEROLOGIC ABO: CPT | Performed by: INTERNAL MEDICINE

## 2019-08-14 PROCEDURE — 86923 COMPATIBILITY TEST ELECTRIC: CPT

## 2019-08-14 RX ORDER — SODIUM CHLORIDE 9 MG/ML
250 INJECTION, SOLUTION INTRAVENOUS AS NEEDED
Status: CANCELLED | OUTPATIENT
Start: 2019-08-14

## 2019-08-14 RX ORDER — FUROSEMIDE 10 MG/ML
20 INJECTION INTRAMUSCULAR; INTRAVENOUS ONCE
Status: CANCELLED | OUTPATIENT
Start: 2019-08-14 | End: 2019-08-14

## 2019-08-14 RX ORDER — PEN NEEDLE, DIABETIC 29 G X1/2"
NEEDLE, DISPOSABLE MISCELLANEOUS
Qty: 400 EACH | Refills: 3 | Status: SHIPPED | OUTPATIENT
Start: 2019-08-14 | End: 2020-10-05

## 2019-08-14 RX ORDER — ACETAMINOPHEN 325 MG/1
325 TABLET ORAL ONCE
Status: CANCELLED | OUTPATIENT
Start: 2019-08-14 | End: 2019-08-14

## 2019-08-14 RX ADMIN — ERYTHROPOIETIN 60000 UNITS: 40000 INJECTION, SOLUTION INTRAVENOUS; SUBCUTANEOUS at 10:29

## 2019-08-14 NOTE — PROGRESS NOTES
Subjective     REASON FOR FOLLOW-UP:   1.  Macrocytic anemia secondary to myelodysplastic syndrome with 5 q. minus and chronic kidney disease  2.  Monoclonal gammopathy of undetermined significance                             REQUESTING PHYSICIAN:  Dr. Baugh    RECORDS OBTAINED:  Records of the patients history including those obtained from the referring provider were reviewed and summarized in detail.    History of Present Illness   The patient return today for follow-up.  She complains of fatigue, shortness of breath and lightheadedness.  She also complains of left leg swelling which is been evaluated by her PCP.  She denies bruising or bleeding.  She denies fever.      Hematology History:  Patient presented as 78-year-old woman for evaluation of anemia.  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  She has stage III chronic kidney disease followed by Dr. Garza of nephrology.  Reviewing her records, the patient has had anemia present since at least 2014 but her hemoglobin has worsened over the past 6 months.  The patient was admitted to the hospital in October 2018 with symptomatic anemia, shortness of breath and lightheadedness.  She was found to have hemoglobin of 7.0.  There was concern for GI blood loss although EGD and colonoscopy performed showed no obvious etiology of blood loss.  The patient states that she was transfused 7 units of packed red blood cells during the hospital stay.  I do not see any Hemoccult results.  She was previously on Eliquis which was discontinued.  Since discharge in October, the patient has been receiving weekly Procrit 20,000 units in the [facility]U at [place], but despite Procrit she has required transfusion on 2 separate occasions.  She is not seeing any bright red blood per rectum or melena.  She complains of fatigue and lightheadedness.    Recent iron profile on 1/17/19 was inconsistent with iron  deficiency with an iron saturation 68% and the ferritin was 352.  The red blood cells are macrocytic.  White blood cell and platelet counts have been normal.    The patient was seen in hematology on 1/29/19 and additional evaluation performed.  The reticulocyte count was elevated 3.72% but the haptoglobin and LDH were both normal arguing against a hemolytic process.  B12 and folic acid levels were normal.  Serum protein electrophoresis showed no M spike but the immunofixation identified a small IgA monoclonal protein with lambda specificity; IgA level 544.  Sedimentation rate elevated 58.  A free light chain ratio from the serum was normal 1.64.    The patient was referred for a bone marrow exam performed on 3/6/2019 which showed a cellularity of 35%.  There was erythroid hyperplasia with megaloblastoid changes, normal myeloid maturation, increased megakaryocytes with frequent micro megakaryocytes, scattered lymphoid aggregates.  There were morphologically normal plasma cells increased in #2 8% of the cellularity.  There were no increased blasts.  No increase in iron stores.  Karyotype showed a deletion 5 q. and 19 of 20 cells analyzed.        Past Medical History:   Diagnosis Date   • Allergic rhinitis    • Anemia    • Anxiety    • Appetite absent    • Arthritis    • Asthma    • Back pain    • Bell's palsy    • Black tarry stools    • Blood in stool    • Chronic fatigue    • CKD (chronic kidney disease) stage 3, GFR 30-59 ml/min (CMS/[facility])    • Community acquired pneumonia of left lung 10/25/2018   • Cough    • Depression    • Diabetes mellitus (CMS/[facility])     LAST A1C 6   • Diabetic gastroparesis (CMS/HCC) 2/19/2016   • Difficulty walking    • Excessive urination at night    • Frequent urination    • GERD (gastroesophageal reflux disease)    • GI bleed    • Gout    • H/O blood clots     LEFT LEG 7 OR 8 YEARS AGO   • Heat intolerance    • History of fall 10/2018   • History of prior pregnancies     x8, miscarriage 5    • History of transfusion 11/2018    due to anemia   • Hyperlipidemia    • Hypertension    • Hypothyroidism    • Normal coronary arteries     by cath 2013   • Orthostatic hypotension    • AARON (obstructive sleep apnea)     DOESNT WEAR REGULARLY   • PONV (postoperative nausea and vomiting)    • Skin cancer    • Stroke (CMS/HCC)     Several mini-strokes   • TIA (transient ischemic attack)     LAST TIA JULY 2017   • Urination pain    • UTI (urinary tract infection)     Dec 2018 and Jan 2019        Past Surgical History:   Procedure Laterality Date   • BACK SURGERY      HARDWARE   • CHOLECYSTECTOMY      OPEN   • COLONOSCOPY  2011    due for repeat in 2021   • COLONOSCOPY N/A 10/28/2018    Procedure: COLONOSCOPY;  Surgeon: Emmanuel Rogers MD;  Location: [facility] OR;  Service: Gastroenterology   • ENDOSCOPY N/A 10/26/2018    Procedure: ESOPHAGOGASTRODUODENOSCOPY;  Surgeon: Emmanuel Rogers MD;  Location: [facility] OR;  Service: Gastroenterology   • HYSTERECTOMY      PARTIAL    • NECK SURGERY     • SPINE SURGERY     • TUMOR REMOVAL Left     Leg   • UPPER GASTROINTESTINAL ENDOSCOPY  2014    gastritis.  done by dr. hastings        Current Outpatient Medications on File Prior to Visit   Medication Sig Dispense Refill   • ACCU-CHEK FASTCLIX LANCETS misc TEST 3-4 TIMES DAILY AS DIRECTED 400 each 3   • ACCU-CHEK SMARTVIEW test strip TEST BLOOD SUGAR THREE TIMES DAILY OR AS DIRECTED 300 each 3   • acetaminophen (TYLENOL) 325 MG tablet Take 2 tablets by mouth Every 6 (Six) Hours As Needed for Mild Pain . OTC product 40 tablet 0   • albuterol sulfate  (90 Base) MCG/ACT inhaler Inhale 2 puffs Every 4 (Four) Hours As Needed for Wheezing. 1 inhaler 3   • atorvastatin (LIPITOR) 10 MG tablet TAKE ONE TABLET BY MOUTH AT BEDTIME 90 tablet 1   • benzonatate (TESSALON) 200 MG capsule TAKE ONE CAPSULE BY MOUTH THREE TIMES DAILY AS NEEDED FOR COUGH 20 capsule 0   • Blood Glucose Monitoring Suppl (ACCU-CHEK KENNETH  "SMARTVIEW) w/Device kit TEST blood sugar three times daily or as directed 1 kit 0   • Calcium Carbonate-Vit D-Min (CALCIUM 1200) 9775-2729 MG-UNIT chewable tablet Chew 1 tablet Daily.     • cyclobenzaprine (FLEXERIL) 10 MG tablet TAKE ONE TABLET BY MOUTH TWICE DAILY as needed for muscle spasms 30 tablet 0   • diphenhydrAMINE (BENADRYL) 25 mg capsule Take 25 mg by mouth Every 6 (Six) Hours As Needed for Itching.     • DULoxetine (CYMBALTA) 30 MG capsule TAKE ONE CAPSULE BY MOUTH TWICE DAILY 60 capsule 5   • fluticasone (FLONASE) 50 MCG/ACT nasal spray 2 sprays into the nostril(s) as directed by provider Daily. 16 g 0   • furosemide (LASIX) 20 MG tablet TAKE ONE (1) TABLET ORALLY (BY MOUTH) ONCE DAILY 90 tablet 1   • gabapentin (NEURONTIN) 400 MG capsule TAKE ONE CAPSULE BY MOUTH EVERY MORNING, ONE AT NOON, AND TWO AT BEDTIME 120 capsule 5   • HYDROcodone-acetaminophen (NORCO) 5-325 MG per tablet Take one po bid    Prn  Moderate pain 60 tablet 0   • HYDROcodone-acetaminophen (NORCO) 5-325 MG per tablet TAKE ONE TABLET every 12 hours BY MOUTH FOR PAIN 60 tablet 0   • insulin aspart (novoLOG) 100 UNIT/ML injection Inject 15 Units under the skin into the appropriate area as directed Daily With Breakfast. 15 units with breakfast 10 units with lunch 10 units with dinner     • Insulin Degludec (TRESIBA FLEXTOUCH) 200 UNIT/ML solution pen-injector Inject 54 Units under the skin into the appropriate area as directed every night at bedtime. 9 mL 11   • levothyroxine (SYNTHROID, LEVOTHROID) 88 MCG tablet TAKE ONE TABLET BY MOUTH EVERY DAY 90 tablet 1   • midodrine (PROAMATINE) 2.5 MG tablet Take 1 tablet by mouth 3 (Three) Times a Day. 90 tablet 6   • Multiple Vitamins-Minerals (CENTRUM SILVER PO) Take  by mouth Daily.     • Needle, Disp, (BD DISP NEEDLES) 30G X 1/2\" misc To be used 3 times daily with Novolog Flexpen. 100 each 5   • NOVOLOG FLEXPEN 100 UNIT/ML solution pen-injector sc pen INJECT 20 UNITS UNDER THE SKIN BEFORE " BREAKFAST THEN 25 UNITS BEFORE LUNCH AND DINNER 15 mL 5   • NYSTATIN 278456 UNIT/GM powder APPLY TOPICALLY 3 TIMES A DAY 60 g 2   • O2 (OXYGEN) Inhale 2 L/min Every Night. Uses 2L  overnight only     • omeprazole (priLOSEC) 40 MG capsule TAKE ONE CAPSULE BY MOUTH EVERY DAY 90 capsule 1   • potassium chloride (K-DUR) 10 MEQ CR tablet TAKE ONE (1) TABLET ORALLY (BY MOUTH) ONCE DAILY 90 tablet 2   • promethazine (PHENERGAN) 12.5 MG tablet TABLET ONE-HALF TABLET TO ONE TABLET BY MOUTH EVERY 6 HOURS AS NEEDED FOR NAUSEA 20 tablet 0   • ULTICARE MINI PEN NEEDLES 31G X 6 MM misc USE TO INJECT INSULINS 4 TIMES DAILY AS DIRECTED 400 each 3   • Alcohol Swabs (B-D SINGLE USE SWABS REGULAR) pads        No current facility-administered medications on file prior to visit.         ALLERGIES:    Allergies   Allergen Reactions   • Baclofen Anxiety     Panic attack, nightmares   • Codeine Itching and Rash   • Lisinopril Cough   • Morphine Hives   • Penicillins Rash     Tolerates cephalosporins         Social History     Socioeconomic History   • Marital status:      Spouse name: Not on file   • Number of children: 3   • Years of education: High School   • Highest education level: Not on file   Occupational History     Employer: RETIRED   Tobacco Use   • Smoking status: Never Smoker   • Smokeless tobacco: Never Used   • Tobacco comment: CAFFEINE USE: NONE   Substance and Sexual Activity   • Alcohol use: No   • Drug use: No   • Sexual activity: Defer     Comment: EXERCISE - RARELY        Family History   Problem Relation Age of Onset   • Lupus Mother    • Heart failure Mother 59   • Heart disease Other    • Hypertension Other    • Heart attack Father         Review of Systems   Constitutional: Positive for activity change and fatigue. Negative for appetite change, fever and unexpected weight change.   HENT: Negative for congestion.    Respiratory: Negative for apnea, cough and shortness of breath.    Cardiovascular: Positive for  "leg swelling.   Gastrointestinal: Negative for abdominal pain, blood in stool, nausea and vomiting.   Genitourinary: Negative for frequency and urgency.   Musculoskeletal: Positive for arthralgias, back pain and gait problem. Negative for neck stiffness.   Skin: Negative.    Neurological: Positive for light-headedness and numbness. Negative for dizziness, tremors and speech difficulty.   Hematological: Negative.    Psychiatric/Behavioral: Negative.        Objective     Vitals:    08/14/19 0938   BP: 117/55   Pulse: 85   Resp: 14   Temp: 97.5 °F (36.4 °C)   TempSrc: Oral   SpO2: 93%   Weight: 94.2 kg (207 lb 9.6 oz)   Height: 157.5 cm (62.01\")   PainSc:   8   PainLoc: Shoulder  Comment: pain over     Current Status 8/14/2019   ECOG score 1       Physical Exam    CON: pleasant well-developed somewhat chronic ill appearing woman  HEENT: no icterus, no thrush, moist membranes  NECK: no jvd  LYMPH: no cervical or supraclavicular lad  CV: RRR, S1S2, no murmur  RESP: cta bilat, no wheezing, no rales  GI: soft, non-tender, no splenomegaly, +bs, obese  MUSC: Trace edema above the left knee, ambulates with cane  NEURO: alert and oriented x3, mild global weakness  PSYCH: normal mood      RECENT LABS:  Hematology WBC   Date Value Ref Range Status   08/14/2019 7.08 3.40 - 10.80 10*3/mm3 Final   04/23/2019 5.14 3.40 - 10.80 10*3/mm3 Final     RBC   Date Value Ref Range Status   08/14/2019 2.24 (L) 3.77 - 5.28 10*6/mm3 Final   04/23/2019 3.02 (L) 3.77 - 5.28 10*6/mm3 Final     Hemoglobin   Date Value Ref Range Status   08/14/2019 8.3 (L) 12.0 - 15.9 g/dL Final     Hematocrit   Date Value Ref Range Status   08/14/2019 25.1 (L) 34.0 - 46.6 % Final     Platelets   Date Value Ref Range Status   08/14/2019 429 140 - 450 10*3/mm3 Final      SPEP 5/16/19 No m-spike  RANJANA:  IgA lambda protein, IgA 509  FLC ratio 1.6    Assessment/Plan     1.  Macrocytic anemia:  BM biopsy 3/5/19 c/w myelodysplastic syndrome with deletion of 5 q.  In " addition, the patient has chronic kidney disease, stage III.  She is currently doing okay on weekly Procrit with minimal transfusion support.  We will continue this plan for now.  Revlimid would be an option down the road if her transfusion requirement increases.  I will reassess her iron studies in 2 months.  Her hemoglobin today is fairly stable 8.3 but she complains of significant fatigue, shortness of breath and lightheadedness.  I therefore recommended we transfused 2 units of packed red blood cells to see if this alleviates her symptoms.    2.  Monoclonal gammopathy of undetermined significance: The patient's bone marrow showed slight increase in plasma cells 8% of the cellularity but normal in morphology.  She has an IgA monoclonality on her immunofixation but no measurable M spike and a normal free light chain ratio.   Repeat studies 5/16/2019 showed a stable IgA 509 with a normal light chain ratio, no M spike.  I reordered a electrophoresis, immunofixation and light chain ratio to be done in 2 months.    3.  Chronic kidney disease, stage III which contributes to anemia    I will see the patient back in 3 months.

## 2019-08-15 ENCOUNTER — HOSPITAL ENCOUNTER (OUTPATIENT)
Dept: INFUSION THERAPY | Facility: HOSPITAL | Age: 79
Setting detail: INFUSION SERIES
Discharge: HOME | End: 2019-08-15
Admitting: INTERNAL MEDICINE

## 2019-08-15 VITALS
BODY MASS INDEX: 38.13 KG/M2 | TEMPERATURE: 98 F | HEART RATE: 82 BPM | WEIGHT: 207.2 LBS | OXYGEN SATURATION: 98 % | HEIGHT: 62 IN | SYSTOLIC BLOOD PRESSURE: 128 MMHG | RESPIRATION RATE: 16 BRPM | DIASTOLIC BLOOD PRESSURE: 61 MMHG

## 2019-08-15 DIAGNOSIS — D46.C MYELODYSPLASTIC SYNDROME WITH 5Q DELETION (HCC): Primary | ICD-10-CM

## 2019-08-15 PROCEDURE — 63710000001 ACETAMINOPHEN 325 MG TABLET

## 2019-08-15 PROCEDURE — 96374 THER/PROPH/DIAG INJ IV PUSH: CPT

## 2019-08-15 PROCEDURE — P9016 RBC LEUKOCYTES REDUCED: HCPCS

## 2019-08-15 PROCEDURE — 36430 TRANSFUSION BLD/BLD COMPNT: CPT

## 2019-08-15 PROCEDURE — A9270 NON-COVERED ITEM OR SERVICE: HCPCS

## 2019-08-15 PROCEDURE — 86900 BLOOD TYPING SEROLOGIC ABO: CPT

## 2019-08-15 PROCEDURE — 25010000002 FUROSEMIDE PER 20 MG

## 2019-08-15 RX ORDER — FUROSEMIDE 10 MG/ML
20 INJECTION INTRAMUSCULAR; INTRAVENOUS ONCE
Status: COMPLETED | OUTPATIENT
Start: 2019-08-15 | End: 2019-08-15

## 2019-08-15 RX ORDER — SODIUM CHLORIDE 9 MG/ML
INJECTION, SOLUTION INTRAVENOUS
Status: COMPLETED
Start: 2019-08-15 | End: 2019-08-15

## 2019-08-15 RX ORDER — ACETAMINOPHEN 325 MG/1
TABLET ORAL
Status: COMPLETED
Start: 2019-08-15 | End: 2019-08-15

## 2019-08-15 RX ORDER — SODIUM CHLORIDE 9 MG/ML
250 INJECTION, SOLUTION INTRAVENOUS AS NEEDED
Status: DISCONTINUED | OUTPATIENT
Start: 2019-08-15 | End: 2019-08-17 | Stop reason: HOSPADM

## 2019-08-15 RX ORDER — FUROSEMIDE 10 MG/ML
INJECTION INTRAMUSCULAR; INTRAVENOUS
Status: COMPLETED
Start: 2019-08-15 | End: 2019-08-15

## 2019-08-15 RX ORDER — ACETAMINOPHEN 325 MG/1
325 TABLET ORAL ONCE
Status: COMPLETED | OUTPATIENT
Start: 2019-08-15 | End: 2019-08-15

## 2019-08-15 RX ADMIN — SODIUM CHLORIDE 250 ML: 9 INJECTION, SOLUTION INTRAVENOUS at 11:40

## 2019-08-15 RX ADMIN — ACETAMINOPHEN 325 MG: 325 TABLET, FILM COATED ORAL at 08:52

## 2019-08-15 RX ADMIN — FUROSEMIDE 20 MG: 10 INJECTION, SOLUTION INTRAMUSCULAR; INTRAVENOUS at 11:43

## 2019-08-15 RX ADMIN — FUROSEMIDE 20 MG: 10 INJECTION INTRAMUSCULAR; INTRAVENOUS at 11:43

## 2019-08-15 RX ADMIN — ACETAMINOPHEN 325 MG: 325 TABLET ORAL at 08:52

## 2019-08-15 NOTE — PATIENT INSTRUCTIONS
"  Call Dr. Elieser Headley, [facility] at [phone] if you have any problems or concerns.    We know you have a Choice in healthcare and appreciate you using [facility].  Our purpose is to provide you \"Excellent Care\".  We hope that you will always choose us in the future and continue to recommend us to your family and friends.              Blood Transfusion, Adult, Care After  This sheet gives you information about how to care for yourself after your procedure. Your doctor may also give you more specific instructions. If you have problems or questions, contact your doctor.  Follow these instructions at home:    · Take over-the-counter and prescription medicines only as told by your doctor.  · Go back to your normal activities as told by your doctor.  · Follow instructions from your doctor about how to take care of the area where an IV tube was put into your vein (insertion site). Make sure you:  ? Wash your hands with soap and water before you change your bandage (dressing). If there is no soap and water, use hand .  ? Change your bandage as told by your doctor.  · Check your IV insertion site every day for signs of infection. Check for:  ? More redness, swelling, or pain.  ? More fluid or blood.  ? Warmth.  ? Pus or a bad smell.  Contact a doctor if:  · You have more redness, swelling, or pain around the IV insertion site..  · You have more fluid or blood coming from the IV insertion site.  · Your IV insertion site feels warm to the touch.  · You have pus or a bad smell coming from the IV insertion site.  · Your pee (urine) turns pink, red, or brown.  · You feel weak after doing your normal activities.  Get help right away if:  · You have signs of a serious allergic or body defense (immune) system reaction, including:  ? Itchiness.  ? Hives.  ? Trouble breathing.  ? Anxiety.  ? Pain in your chest or lower back.  ? Fever, flushing, and chills.  ? Fast pulse.  ? Rash.  ? Watery poop " (diarrhea).  ? Throwing up (vomiting).  ? Dark pee.  ? Serious headache.  ? Dizziness.  ? Stiff neck.  ? Yellow color in your face or the white parts of your eyes (jaundice).  Summary  · After a blood transfusion, return to your normal activities as told by your doctor.  · Every day, check for signs of infection where the IV tube was put into your vein.  · Some signs of infection are warm skin, more redness and pain, more fluid or blood, and pus or a bad smell where the needle went in.  · Contact your doctor if you feel weak or have any unusual symptoms.  This information is not intended to replace advice given to you by your health care provider. Make sure you discuss any questions you have with your health care provider.  Document Released: 01/08/2016 Document Revised: 08/11/2017 Document Reviewed: 08/11/2017  [employer] Interactive Patient Education © 2019 [employer]

## 2019-08-15 NOTE — NURSING NOTE
1452 Patient in [facility] today & received 2 units PRBC's without s/s of reaction. Waiting fo son to come & . Denies c/o.

## 2019-08-16 RX ORDER — FUROSEMIDE 20 MG/1
TABLET ORAL
Qty: 90 TABLET | Refills: 1 | OUTPATIENT
Start: 2019-08-16

## 2019-08-16 RX ORDER — ATORVASTATIN CALCIUM 10 MG/1
TABLET, FILM COATED ORAL
Qty: 90 TABLET | Refills: 1 | OUTPATIENT
Start: 2019-08-16

## 2019-08-19 RX ORDER — ATORVASTATIN CALCIUM 10 MG/1
TABLET, FILM COATED ORAL
Qty: 90 TABLET | Refills: 1 | Status: SHIPPED | OUTPATIENT
Start: 2019-08-19 | End: 2020-03-24

## 2019-08-19 RX ORDER — FUROSEMIDE 20 MG/1
TABLET ORAL
Qty: 90 TABLET | Refills: 1 | Status: SHIPPED | OUTPATIENT
Start: 2019-08-19 | End: 2020-04-08

## 2019-08-22 ENCOUNTER — LAB (OUTPATIENT)
Dept: LAB | Facility: HOSPITAL | Age: 79
End: 2019-08-22

## 2019-08-22 ENCOUNTER — INFUSION (OUTPATIENT)
Dept: ONCOLOGY | Facility: HOSPITAL | Age: 79
End: 2019-08-22

## 2019-08-22 DIAGNOSIS — D64.9 CHRONIC ANEMIA: Primary | ICD-10-CM

## 2019-08-22 LAB
BASOPHILS # BLD AUTO: 0.13 10*3/MM3 (ref 0–0.2)
BASOPHILS NFR BLD AUTO: 2 % (ref 0–1.5)
DEPRECATED RDW RBC AUTO: 80.2 FL (ref 37–54)
EOSINOPHIL # BLD AUTO: 0.39 10*3/MM3 (ref 0–0.4)
EOSINOPHIL NFR BLD AUTO: 6 % (ref 0.3–6.2)
ERYTHROCYTE [DISTWIDTH] IN BLOOD BY AUTOMATED COUNT: 20.7 % (ref 12.3–15.4)
HCT VFR BLD AUTO: 34.2 % (ref 34–46.6)
HGB BLD-MCNC: 11.5 G/DL (ref 12–15.9)
IMM GRANULOCYTES # BLD AUTO: 0.09 10*3/MM3 (ref 0–0.05)
IMM GRANULOCYTES NFR BLD AUTO: 1.4 % (ref 0–0.5)
LYMPHOCYTES # BLD AUTO: 2.78 10*3/MM3 (ref 0.7–3.1)
LYMPHOCYTES NFR BLD AUTO: 42.5 % (ref 19.6–45.3)
MCH RBC QN AUTO: 35.8 PG (ref 26.6–33)
MCHC RBC AUTO-ENTMCNC: 33.6 G/DL (ref 31.5–35.7)
MCV RBC AUTO: 106.5 FL (ref 79–97)
MONOCYTES # BLD AUTO: 0.24 10*3/MM3 (ref 0.1–0.9)
MONOCYTES NFR BLD AUTO: 3.7 % (ref 5–12)
NEUTROPHILS # BLD AUTO: 2.91 10*3/MM3 (ref 1.7–7)
NEUTROPHILS NFR BLD AUTO: 44.4 % (ref 42.7–76)
NRBC BLD AUTO-RTO: 0 /100 WBC (ref 0–0.2)
PLATELET # BLD AUTO: 359 10*3/MM3 (ref 140–450)
PMV BLD AUTO: 11.9 FL (ref 6–12)
RBC # BLD AUTO: 3.21 10*6/MM3 (ref 3.77–5.28)
WBC NRBC COR # BLD: 6.54 10*3/MM3 (ref 3.4–10.8)

## 2019-08-22 PROCEDURE — 85025 COMPLETE CBC W/AUTO DIFF WBC: CPT | Performed by: INTERNAL MEDICINE

## 2019-08-22 PROCEDURE — 36415 COLL VENOUS BLD VENIPUNCTURE: CPT | Performed by: INTERNAL MEDICINE

## 2019-08-22 NOTE — PROGRESS NOTES
Hgb 11.5 today. Pt will not receive procrit today.  Pt stated feeling great since she received 2 units of blood last week.  Pt to return next week for possible procrit.

## 2019-08-29 ENCOUNTER — APPOINTMENT (OUTPATIENT)
Dept: LAB | Facility: HOSPITAL | Age: 79
End: 2019-08-29

## 2019-08-29 ENCOUNTER — INFUSION (OUTPATIENT)
Dept: ONCOLOGY | Facility: HOSPITAL | Age: 79
End: 2019-08-29

## 2019-08-29 LAB
BASOPHILS # BLD AUTO: 0.11 10*3/MM3 (ref 0–0.2)
BASOPHILS NFR BLD AUTO: 1.8 % (ref 0–1.5)
DEPRECATED RDW RBC AUTO: 76.8 FL (ref 37–54)
EOSINOPHIL # BLD AUTO: 0.36 10*3/MM3 (ref 0–0.4)
EOSINOPHIL NFR BLD AUTO: 6 % (ref 0.3–6.2)
ERYTHROCYTE [DISTWIDTH] IN BLOOD BY AUTOMATED COUNT: 20.3 % (ref 12.3–15.4)
HCT VFR BLD AUTO: 31.1 % (ref 34–46.6)
HGB BLD-MCNC: 10.3 G/DL (ref 12–15.9)
IMM GRANULOCYTES # BLD AUTO: 0.07 10*3/MM3 (ref 0–0.05)
IMM GRANULOCYTES NFR BLD AUTO: 1.2 % (ref 0–0.5)
LYMPHOCYTES # BLD AUTO: 2.43 10*3/MM3 (ref 0.7–3.1)
LYMPHOCYTES NFR BLD AUTO: 40.3 % (ref 19.6–45.3)
MCH RBC QN AUTO: 34.4 PG (ref 26.6–33)
MCHC RBC AUTO-ENTMCNC: 33.1 G/DL (ref 31.5–35.7)
MCV RBC AUTO: 104 FL (ref 79–97)
MONOCYTES # BLD AUTO: 0.32 10*3/MM3 (ref 0.1–0.9)
MONOCYTES NFR BLD AUTO: 5.3 % (ref 5–12)
NEUTROPHILS # BLD AUTO: 2.74 10*3/MM3 (ref 1.7–7)
NEUTROPHILS NFR BLD AUTO: 45.4 % (ref 42.7–76)
NRBC BLD AUTO-RTO: 0 /100 WBC (ref 0–0.2)
PLATELET # BLD AUTO: 420 10*3/MM3 (ref 140–450)
PMV BLD AUTO: 11.3 FL (ref 6–12)
RBC # BLD AUTO: 2.99 10*6/MM3 (ref 3.77–5.28)
WBC NRBC COR # BLD: 6.03 10*3/MM3 (ref 3.4–10.8)

## 2019-08-29 PROCEDURE — 85025 COMPLETE CBC W/AUTO DIFF WBC: CPT | Performed by: INTERNAL MEDICINE

## 2019-08-29 PROCEDURE — 36415 COLL VENOUS BLD VENIPUNCTURE: CPT | Performed by: INTERNAL MEDICINE

## 2019-08-29 NOTE — PROGRESS NOTES
Pt did not receive procrit today because hgb was 10.3. She was informed and v/u.She will followup as scheduled for a repeat CBC and possible procrit in 1 week.  The pt states that she is feeling well but has been more sleepy than usual this week.

## 2019-09-03 ENCOUNTER — OFFICE VISIT (OUTPATIENT)
Dept: INTERNAL MEDICINE | Facility: CLINIC | Age: 79
End: 2019-09-03

## 2019-09-03 VITALS
OXYGEN SATURATION: 90 % | BODY MASS INDEX: 37.9 KG/M2 | DIASTOLIC BLOOD PRESSURE: 58 MMHG | RESPIRATION RATE: 20 BRPM | HEART RATE: 92 BPM | SYSTOLIC BLOOD PRESSURE: 152 MMHG | HEIGHT: 62 IN

## 2019-09-03 DIAGNOSIS — E11.40 TYPE 2 DIABETES MELLITUS WITH DIABETIC NEUROPATHY, WITH LONG-TERM CURRENT USE OF INSULIN (HCC): ICD-10-CM

## 2019-09-03 DIAGNOSIS — Z12.31 BREAST CANCER SCREENING BY MAMMOGRAM: ICD-10-CM

## 2019-09-03 DIAGNOSIS — D64.9 CHRONIC ANEMIA: ICD-10-CM

## 2019-09-03 DIAGNOSIS — F32.A ANXIETY AND DEPRESSION: ICD-10-CM

## 2019-09-03 DIAGNOSIS — F41.9 ANXIETY AND DEPRESSION: ICD-10-CM

## 2019-09-03 DIAGNOSIS — Z78.0 POST-MENOPAUSAL: ICD-10-CM

## 2019-09-03 DIAGNOSIS — Z86.718 HISTORY OF DEEP VENOUS THROMBOSIS (DVT) OF DISTAL VEIN OF LEFT LOWER EXTREMITY: ICD-10-CM

## 2019-09-03 DIAGNOSIS — I10 ESSENTIAL HYPERTENSION: ICD-10-CM

## 2019-09-03 DIAGNOSIS — Z00.00 MEDICARE ANNUAL WELLNESS VISIT, INITIAL: Primary | ICD-10-CM

## 2019-09-03 DIAGNOSIS — Z79.4 TYPE 2 DIABETES MELLITUS WITH DIABETIC NEUROPATHY, WITH LONG-TERM CURRENT USE OF INSULIN (HCC): ICD-10-CM

## 2019-09-03 DIAGNOSIS — D46.C MYELODYSPLASTIC SYNDROME WITH 5Q DELETION (HCC): ICD-10-CM

## 2019-09-03 LAB
HGB BLDA-MCNC: 9.3 G/DL (ref 12–17)
POC CREATININE URINE: 50
POC MICROALBUMIN URINE: 30

## 2019-09-03 PROCEDURE — G0008 ADMIN INFLUENZA VIRUS VAC: HCPCS | Performed by: INTERNAL MEDICINE

## 2019-09-03 PROCEDURE — 82044 UR ALBUMIN SEMIQUANTITATIVE: CPT | Performed by: INTERNAL MEDICINE

## 2019-09-03 PROCEDURE — 90662 IIV NO PRSV INCREASED AG IM: CPT | Performed by: INTERNAL MEDICINE

## 2019-09-03 PROCEDURE — 85018 HEMOGLOBIN: CPT | Performed by: INTERNAL MEDICINE

## 2019-09-03 PROCEDURE — 96160 PT-FOCUSED HLTH RISK ASSMT: CPT | Performed by: INTERNAL MEDICINE

## 2019-09-03 PROCEDURE — 99397 PER PM REEVAL EST PAT 65+ YR: CPT | Performed by: INTERNAL MEDICINE

## 2019-09-03 PROCEDURE — G0439 PPPS, SUBSEQ VISIT: HCPCS | Performed by: INTERNAL MEDICINE

## 2019-09-03 PROCEDURE — 99214 OFFICE O/P EST MOD 30 MIN: CPT | Performed by: INTERNAL MEDICINE

## 2019-09-03 PROCEDURE — 82570 ASSAY OF URINE CREATININE: CPT | Performed by: INTERNAL MEDICINE

## 2019-09-03 RX ORDER — DESVENLAFAXINE SUCCINATE 50 MG/1
50 TABLET, EXTENDED RELEASE ORAL DAILY
Qty: 30 TABLET | Refills: 0 | Status: SHIPPED | OUTPATIENT
Start: 2019-09-03 | End: 2019-09-16 | Stop reason: SDUPTHER

## 2019-09-03 NOTE — PROGRESS NOTES
The ABCs of the Annual Wellness Visit  Initial Medicare Wellness Visit    Chief Complaint   Patient presents with   • Congestive Heart Failure       Subjective   History of Present Illness:  Joya Singh is a 79 y.o. female who presents for an Initial Medicare Wellness Visit.    HEALTH RISK ASSESSMENT    Recent Hospitalizations:  No hospitalization(s) within the last year.    Current Medical Providers:  Patient Care Team:  Chari Mercado MD as PCP - General  Chari Mercado MD as PCP - Family Medicine  Christos Rob MD as Surgeon (General Surgery)  German Wylie MD as Surgeon (Orthopedic Surgery)  Yasmani Baugh MD as Consulting Physician (Nephrology)  Yasmani Baugh MD as Referring Physician (Nephrology)  Elieser Headley MD as Consulting Physician (Hematology and Oncology)    Smoking Status:  Social History     Tobacco Use   Smoking Status Never Smoker   Smokeless Tobacco Never Used   Tobacco Comment    CAFFEINE USE: NONE       Alcohol Consumption:  Social History     Substance and Sexual Activity   Alcohol Use No       Depression Screen:   PHQ-2/PHQ-9 Depression Screening 9/3/2019   Little interest or pleasure in doing things 2   Feeling down, depressed, or hopeless 2   Trouble falling or staying asleep, or sleeping too much 2   Feeling tired or having little energy 2   Poor appetite or overeating 1   Feeling bad about yourself - or that you are a failure or have let yourself or your family down 2   Trouble concentrating on things, such as reading the newspaper or watching television 2   Moving or speaking so slowly that other people could have noticed. Or the opposite - being so fidgety or restless that you have been moving around a lot more than usual 0   Thoughts that you would be better off dead, or of hurting yourself in some way 1   Total Score 14   If you checked off any problems, how difficult have these problems made it for you to do your work, take care of  things at home, or get along with other people? Very difficult       Fall Risk Screen:  NEERAJ Fall Risk Assessment was completed, and patient is at LOW risk for falls.Assessment completed on:9/3/2019    Health Habits and Functional and Cognitive Screening:  Functional & Cognitive Status 9/3/2019   Do you have difficulty preparing food and eating? No   Do you have difficulty bathing yourself, getting dressed or grooming yourself? No   Do you have difficulty using the toilet? No   Do you have difficulty moving around from place to place? No   Do you have trouble with steps or getting out of a bed or a chair? No   Current Diet Limited Junk Food   Dental Exam Not up to date   Eye Exam Not up to date   Exercise (times per week) 0 times per week   Current Exercise Activities Include None   Do you need help using the phone?  No   Are you deaf or do you have serious difficulty hearing?  No   Do you need help with transportation? Yes   Do you need help shopping? No   Do you need help preparing meals?  No   Do you need help with housework?  Yes   Do you need help with laundry? No   Do you need help taking your medications? No   Do you need help managing money? No   Do you ever drive or ride in a car without wearing a seat belt? No   Have you felt unusual stress, anger or loneliness in the last month? Yes   Who do you live with? Child   If you need help, do you have trouble finding someone available to you? No   Have you been bothered in the last four weeks by sexual problems? No   Do you have difficulty concentrating, remembering or making decisions? No         Does the patient have evidence of cognitive impairment? No    Asprin use counseling:Does not need ASA (and currently is not on it)   Asa stopped bc of GI bleeding    Age-appropriate Screening Schedule:  Refer to the list below for future screening recommendations based on patient's age, sex and/or medical conditions. Orders for these recommended tests are listed in  the plan section. The patient has been provided with a written plan.    Health Maintenance   Topic Date Due   • TDAP/TD VACCINES (1 - Tdap) 04/15/1961   • ZOSTER VACCINE (1 of 2) 04/15/1992   • MAMMOGRAM  02/03/2016   • URINE MICROALBUMIN  02/21/2019   • INFLUENZA VACCINE  08/01/2019   • DXA SCAN  09/03/2019   • HEMOGLOBIN A1C  09/18/2019   • LIPID PANEL  03/18/2020   • COLONOSCOPY  10/28/2028   • PNEUMOCOCCAL VACCINES (65+ LOW/MEDIUM RISK)  Completed          The following portions of the patient's history were reviewed and updated as appropriate: allergies, current medications, past family history, past medical history, past social history, past surgical history and problem list.    Outpatient Medications Prior to Visit   Medication Sig Dispense Refill   • ACCU-CHEK FASTCLIX LANCETS misc TEST 3-4 TIMES DAILY AS DIRECTED 400 each 3   • ACCU-CHEK SMARTVIEW test strip TEST BLOOD SUGAR THREE TIMES DAILY OR AS DIRECTED 300 each 3   • acetaminophen (TYLENOL) 325 MG tablet Take 2 tablets by mouth Every 6 (Six) Hours As Needed for Mild Pain . OTC product 40 tablet 0   • albuterol sulfate  (90 Base) MCG/ACT inhaler Inhale 2 puffs Every 4 (Four) Hours As Needed for Wheezing. 1 inhaler 3   • Alcohol Swabs (B-D SINGLE USE SWABS REGULAR) pads      • atorvastatin (LIPITOR) 10 MG tablet TAKE ONE TABLET BY MOUTH AT BEDTIME 90 tablet 1   • benzonatate (TESSALON) 200 MG capsule TAKE ONE CAPSULE BY MOUTH THREE TIMES DAILY AS NEEDED FOR COUGH 20 capsule 0   • Blood Glucose Monitoring Suppl (ACCU-CHEK KENNETH SMARTVIEW) w/Device kit TEST blood sugar three times daily or as directed 1 kit 0   • Calcium Carbonate-Vit D-Min (CALCIUM 1200) 2722-1357 MG-UNIT chewable tablet Chew 1 tablet Daily.     • cyclobenzaprine (FLEXERIL) 10 MG tablet TAKE ONE TABLET BY MOUTH TWICE DAILY as needed for muscle spasms 30 tablet 0   • diphenhydrAMINE (BENADRYL) 25 mg capsule Take 25 mg by mouth Every 6 (Six) Hours As Needed for Itching.     •  "fluticasone (FLONASE) 50 MCG/ACT nasal spray 2 sprays into the nostril(s) as directed by provider Daily. 16 g 0   • furosemide (LASIX) 20 MG tablet TAKE ONE (1) TABLET ORALLY (BY MOUTH) ONCE DAILY 90 tablet 1   • gabapentin (NEURONTIN) 400 MG capsule TAKE ONE CAPSULE BY MOUTH EVERY MORNING, ONE AT NOON, AND TWO AT BEDTIME 120 capsule 5   • HYDROcodone-acetaminophen (NORCO) 5-325 MG per tablet Take one po bid    Prn  Moderate pain 60 tablet 0   • HYDROcodone-acetaminophen (NORCO) 5-325 MG per tablet TAKE ONE TABLET every 12 hours BY MOUTH FOR PAIN 60 tablet 0   • insulin aspart (novoLOG) 100 UNIT/ML injection Inject 15 Units under the skin into the appropriate area as directed Daily With Breakfast. 15 units with breakfast 10 units with lunch 10 units with dinner     • Insulin Degludec (TRESIBA FLEXTOUCH) 200 UNIT/ML solution pen-injector Inject 54 Units under the skin into the appropriate area as directed every night at bedtime. 9 mL 11   • levothyroxine (SYNTHROID, LEVOTHROID) 88 MCG tablet TAKE ONE TABLET BY MOUTH EVERY DAY 90 tablet 1   • midodrine (PROAMATINE) 2.5 MG tablet Take 1 tablet by mouth 3 (Three) Times a Day. 90 tablet 6   • Multiple Vitamins-Minerals (CENTRUM SILVER PO) Take  by mouth Daily.     • Needle, Disp, (BD DISP NEEDLES) 30G X 1/2\" misc To be used 3 times daily with Novolog Flexpen. 100 each 5   • NOVOLOG FLEXPEN 100 UNIT/ML solution pen-injector sc pen INJECT 20 UNITS UNDER THE SKIN BEFORE BREAKFAST THEN 25 UNITS BEFORE LUNCH AND DINNER 15 mL 5   • NYSTATIN 351283 UNIT/GM powder APPLY TOPICALLY 3 TIMES A DAY 60 g 2   • O2 (OXYGEN) Inhale 2 L/min Every Night. Uses 2L  overnight only     • omeprazole (priLOSEC) 40 MG capsule TAKE ONE CAPSULE BY MOUTH EVERY DAY 90 capsule 1   • potassium chloride (K-DUR) 10 MEQ CR tablet TAKE ONE (1) TABLET ORALLY (BY MOUTH) ONCE DAILY 90 tablet 2   • promethazine (PHENERGAN) 12.5 MG tablet TABLET ONE-HALF TABLET TO ONE TABLET BY MOUTH EVERY 6 HOURS AS NEEDED FOR " NAUSEA 20 tablet 0   • ULTICARE MINI PEN NEEDLES 31G X 6 MM misc USE TO INJECT INSULINS 4 TIMES DAILY AS DIRECTED 400 each 3   • DULoxetine (CYMBALTA) 30 MG capsule TAKE ONE CAPSULE BY MOUTH TWICE DAILY 60 capsule 5     No facility-administered medications prior to visit.        Patient Active Problem List   Diagnosis   • Deep vein thrombosis of lower extremity (CMS/[facility])   • Arthritis   • Chronic back pain   • Chronic anemia   • Type 2 diabetes mellitus with neurologic complication (CMS/[facility])   • Brittle diabetes mellitus (CMS/[facility])   • Hyperlipidemia   • Hypertension   • Insomnia with sleep apnea   • Obstructive sleep apnea syndrome   • Peripheral neuropathy   • Diabetic gastroparesis (CMS/[facility])   • Primary localized osteoarthrosis of left shoulder region   • Rotator cuff tendonitis   • Acquired hypothyroidism   • Anxiety   • Gastroesophageal reflux disease   • History of biliary T-tube placement   • CKD stage 3 due to type 2 diabetes mellitus (CMS/[facility])   • Complex tear of medial meniscus of left knee as current injury   • Insufficiency fracture of tibia   • Primary osteoarthritis of left knee   • Orthostatic hypotension   • Tendinitis of right rotator cuff   • AC joint arthropathy   • Subacromial impingement of right shoulder   • Right carpal tunnel syndrome   • Anemia requiring transfusions   • Monoclonal gammopathy of unknown significance (MGUS)   • Myelodysplastic syndrome with 5q deletion (CMS/[facility])   • History of deep venous thrombosis (DVT) of distal vein of left lower extremity   • Moderate episode of recurrent major depressive disorder (CMS/[facility])   • Chronic diastolic CHF (congestive heart failure) (CMS/[facility])       Advanced Care Planning:  Patient has an advance directive - a copy has been provided and is visible in patient header    Review of Systems   Constitutional: Negative.    HENT: Negative.    Eyes: Negative.    Respiratory: Negative.    Cardiovascular: Negative.    Gastrointestinal: Negative.   "  Endocrine: Negative.    Genitourinary: Negative.    Musculoskeletal: Negative.    Skin: Negative.    Allergic/Immunologic: Negative.    Neurological: Negative.    Hematological: Negative.    Psychiatric/Behavioral: Positive for dysphoric mood.   All other systems reviewed and are negative.      Compared to one year ago, the patient feels her physical health is worse.  Compared to one year ago, the patient feels her mental health is worse.    Reviewed chart for potential of high risk medication in the elderly: yes  Reviewed chart for potential of harmful drug interactions in the elderly:yes    Objective         Vitals:    09/03/19 0919   BP: 152/58   Pulse: 92   Resp: 20   SpO2: 90%   Weight: Comment: patient refused   Height: 157.5 cm (62\")       Body mass index is 37.9 kg/m².  Discussed the patient's BMI with her. The BMI is above average; no BMI management plan is appropriate..    Physical Exam   Constitutional: She is oriented to person, place, and time. She appears well-developed and well-nourished. No distress.   HENT:   Head: Normocephalic and atraumatic.   Right Ear: External ear normal.   Left Ear: External ear normal.   Nose: Nose normal.   Mouth/Throat: Oropharynx is clear and moist.   Eyes: Conjunctivae and EOM are normal. Pupils are equal, round, and reactive to light. Right eye exhibits no discharge. Left eye exhibits no discharge. No scleral icterus.   Neck: Normal range of motion. Neck supple. No JVD present. No tracheal deviation present. No thyromegaly present.   Cardiovascular: Normal rate, regular rhythm, normal heart sounds and intact distal pulses.   No murmur heard.  Pulmonary/Chest: Effort normal and breath sounds normal. No respiratory distress. She has no wheezes.   Abdominal: Soft. She exhibits no distension and no mass. There is no tenderness. There is no rebound and no guarding.   Musculoskeletal: Normal range of motion. She exhibits no edema or tenderness.   Lymphadenopathy:     She " has no cervical adenopathy.   Neurological: She is alert and oriented to person, place, and time. She has normal reflexes. No cranial nerve deficit. She exhibits normal muscle tone.   Skin: Skin is warm and dry. No rash noted. No pallor.   Psychiatric: She has a normal mood and affect. Her behavior is normal. Judgment and thought content normal.   Nursing note and vitals reviewed.      Lab Results   Component Value Date     (H) 08/02/2019        Assessment/Plan   Medicare Risks and Personalized Health Plan  CMS Preventative Services Quick Reference  Breast Cancer/Mammogram Screening  Cardiovascular risk  Depression/Dysphoria  Fall Risk  Immunizations Discussed/Encouraged (specific immunizations; adacel Tdap, Influenza and Shingrix )  Inadequate Social Support, Isolation, Loneliness, Lack of Transportation, Financial Difficulties, or Caregiver Stress   Obesity/Overweight   Osteoprorosis Risk  Polypharmacy    The above risks/problems have been discussed with the patient.  Pertinent information has been shared with the patient in the After Visit Summary.  Follow up plans and orders are seen below in the Assessment/Plan Section.    Diagnoses and all orders for this visit:    1. Medicare annual wellness visit, initial (Primary)    2. Chronic anemia  -     POCT hemoglobin    3. Myelodysplastic syndrome with 5q deletion (CMS/[facility])    4. History of deep venous thrombosis (DVT) of distal vein of left lower extremity    5. Type 2 diabetes mellitus with diabetic neuropathy, with long-term current use of insulin (CMS/[facility])  -     POCT microalbumin    6. Essential hypertension    7. Anxiety and depression  -     desvenlafaxine (PRISTIQ) 50 MG 24 hr tablet; Take 1 tablet by mouth Daily.  Dispense: 30 tablet; Refill: 0    8. Breast cancer screening by mammogram  -     Mammo Screening Bilateral With CAD; Future    9. Post-menopausal  -     DEXA Bone Density Axial; Future      Follow Up:  Return in about 1 month (around  10/3/2019) for Recheck.     An After Visit Summary and PPPS were given to the patient.

## 2019-09-03 NOTE — PROGRESS NOTES
"      Joya Singh is a 79 y.o. female, who presents with a chief complaint of   Chief Complaint   Patient presents with   • Congestive Heart Failure   mds, depression, diabetes    HPI   Pt here for follow up andd medicare exam    MDS - her anemia had been worsening.  She saw Dr. Headley on 8/14 and was more symptomatic.  She received 2 units PRBC's on 8/15.  She has been on procrit.  Today she is asking is she should just stop taking procrit bc she is \"tired.\"  She is having issues with her family/living situation.  Her daughter-in-law asked her to leave and her son said no.  She doesn't like her other DIL.  She says she probably can't afford assisted living.  We discussed that being upset or depressed about a home situation is not a reason to stop treatment that doesn't have significant side effects.  She is already on cymbalta 30mg bid.  She doesn't think this helps much.      She does go to the [place] 3 times a week and she enjoys this.      HTN - bp up this am.  Pt says she is taking all of her meds.  Her DIL lost her home bp cuff.     chf - weight stable.  She cont to have occ episodes of chest pain at rest. She saw cards in June and these episodes were felt to be non-cardiac.  The pt thinks the episodes are worse when she is stressed/depressed.     Dm - checks glucoses somewhat sporadically.  Takes nightly meds but not all doses of novolog.       The following portions of the patient's history were reviewed and updated as appropriate: allergies, current medications, past family history, past medical history, past social history, past surgical history and problem list.    Allergies: Baclofen; Codeine; Lisinopril; Morphine; and Penicillins    Review of Systems   Constitutional: Negative.    HENT: Negative.    Eyes: Negative.    Respiratory: Negative.    Cardiovascular: Negative.    Gastrointestinal: Negative.    Endocrine: Negative.    Genitourinary: Negative.    Musculoskeletal: Negative.    Skin: Negative. "    Allergic/Immunologic: Negative.    Neurological: Negative.    Hematological: Negative.    Psychiatric/Behavioral: Positive for dysphoric mood. Negative for self-injury and suicidal ideas.   All other systems reviewed and are negative.            Wt Readings from Last 3 Encounters:   08/15/19 94 kg (207 lb 3.2 oz)   08/14/19 94.2 kg (207 lb 9.6 oz)   08/07/19 93.4 kg (206 lb)     Temp Readings from Last 3 Encounters:   08/15/19 98 °F (36.7 °C)   08/14/19 97.5 °F (36.4 °C) (Oral)   08/08/19 98.1 °F (36.7 °C)     BP Readings from Last 3 Encounters:   09/03/19 152/58   08/15/19 128/61   08/14/19 117/55     Pulse Readings from Last 3 Encounters:   09/03/19 92   08/15/19 82   08/14/19 85     Body mass index is 37.9 kg/m².  @LASTSAO2(3)@    Physical Exam   Constitutional: She is oriented to person, place, and time. She appears well-developed and well-nourished. No distress.   HENT:   Head: Normocephalic and atraumatic.   Right Ear: External ear normal.   Left Ear: External ear normal.   Nose: Nose normal.   Mouth/Throat: Oropharynx is clear and moist.   Eyes: Conjunctivae and EOM are normal. Pupils are equal, round, and reactive to light.   Neck: Normal range of motion. Neck supple.   Cardiovascular: Normal rate, regular rhythm, normal heart sounds and intact distal pulses.   Pulmonary/Chest: Effort normal and breath sounds normal. No respiratory distress. She has no wheezes.   Musculoskeletal: Normal range of motion.   Normal gait   Neurological: She is alert and oriented to person, place, and time.   Skin: Skin is warm and dry.   Psychiatric: She has a normal mood and affect. Her behavior is normal. Judgment and thought content normal.   Nursing note and vitals reviewed.      Results for orders placed or performed in visit on 09/03/19   POCT hemoglobin   Result Value Ref Range    Hemoglobin 9.3 (A) 12.0 - 17.0 g/dL   POCT microalbumin   Result Value Ref Range    Microalbumin, Urine 30     Creatinine, Urine 50             Joya was seen today for congestive heart failure.    Diagnoses and all orders for this visit:    Medicare annual wellness visit, initial    Chronic anemia  -     POCT hemoglobin    Myelodysplastic syndrome with 5q deletion (CMS/HCC) - pt's anemia slowly worsening. Last PRBC tx 8/15. Cont procrit and f/u with dr. Headley.    History of deep venous thrombosis (DVT) of distal vein of left lower extremity    Type 2 diabetes mellitus with diabetic neuropathy, with long-term current use of insulin (CMS/[facility])  -     POCT microalbumin    Essential hypertension    Anxiety and depression  -     desvenlafaxine (PRISTIQ) 50 MG 24 hr tablet; Take 1 tablet by mouth Daily.    Breast cancer screening by mammogram  -     Mammo Screening Bilateral With CAD; Future    Post-menopausal  -     DEXA Bone Density Axial; Future      Pt's depression worse changing from cymbalta to pristiq.      Outpatient Medications Prior to Visit   Medication Sig Dispense Refill   • ACCU-CHEK FASTCLIX LANCETS misc TEST 3-4 TIMES DAILY AS DIRECTED 400 each 3   • ACCU-CHEK SMARTVIEW test strip TEST BLOOD SUGAR THREE TIMES DAILY OR AS DIRECTED 300 each 3   • acetaminophen (TYLENOL) 325 MG tablet Take 2 tablets by mouth Every 6 (Six) Hours As Needed for Mild Pain . OTC product 40 tablet 0   • albuterol sulfate  (90 Base) MCG/ACT inhaler Inhale 2 puffs Every 4 (Four) Hours As Needed for Wheezing. 1 inhaler 3   • Alcohol Swabs (B-D SINGLE USE SWABS REGULAR) pads      • atorvastatin (LIPITOR) 10 MG tablet TAKE ONE TABLET BY MOUTH AT BEDTIME 90 tablet 1   • benzonatate (TESSALON) 200 MG capsule TAKE ONE CAPSULE BY MOUTH THREE TIMES DAILY AS NEEDED FOR COUGH 20 capsule 0   • Blood Glucose Monitoring Suppl (ACCU-CHEK KENNETH SMARTVIEW) w/Device kit TEST blood sugar three times daily or as directed 1 kit 0   • Calcium Carbonate-Vit D-Min (CALCIUM 1200) 9797-9161 MG-UNIT chewable tablet Chew 1 tablet Daily.     • cyclobenzaprine (FLEXERIL) 10 MG tablet TAKE ONE  "TABLET BY MOUTH TWICE DAILY as needed for muscle spasms 30 tablet 0   • diphenhydrAMINE (BENADRYL) 25 mg capsule Take 25 mg by mouth Every 6 (Six) Hours As Needed for Itching.     • fluticasone (FLONASE) 50 MCG/ACT nasal spray 2 sprays into the nostril(s) as directed by provider Daily. 16 g 0   • furosemide (LASIX) 20 MG tablet TAKE ONE (1) TABLET ORALLY (BY MOUTH) ONCE DAILY 90 tablet 1   • gabapentin (NEURONTIN) 400 MG capsule TAKE ONE CAPSULE BY MOUTH EVERY MORNING, ONE AT NOON, AND TWO AT BEDTIME 120 capsule 5   • HYDROcodone-acetaminophen (NORCO) 5-325 MG per tablet Take one po bid    Prn  Moderate pain 60 tablet 0   • HYDROcodone-acetaminophen (NORCO) 5-325 MG per tablet TAKE ONE TABLET every 12 hours BY MOUTH FOR PAIN 60 tablet 0   • insulin aspart (novoLOG) 100 UNIT/ML injection Inject 15 Units under the skin into the appropriate area as directed Daily With Breakfast. 15 units with breakfast 10 units with lunch 10 units with dinner     • Insulin Degludec (TRESIBA FLEXTOUCH) 200 UNIT/ML solution pen-injector Inject 54 Units under the skin into the appropriate area as directed every night at bedtime. 9 mL 11   • levothyroxine (SYNTHROID, LEVOTHROID) 88 MCG tablet TAKE ONE TABLET BY MOUTH EVERY DAY 90 tablet 1   • midodrine (PROAMATINE) 2.5 MG tablet Take 1 tablet by mouth 3 (Three) Times a Day. 90 tablet 6   • Multiple Vitamins-Minerals (CENTRUM SILVER PO) Take  by mouth Daily.     • Needle, Disp, (BD DISP NEEDLES) 30G X 1/2\" misc To be used 3 times daily with Novolog Flexpen. 100 each 5   • NOVOLOG FLEXPEN 100 UNIT/ML solution pen-injector sc pen INJECT 20 UNITS UNDER THE SKIN BEFORE BREAKFAST THEN 25 UNITS BEFORE LUNCH AND DINNER 15 mL 5   • NYSTATIN 255741 UNIT/GM powder APPLY TOPICALLY 3 TIMES A DAY 60 g 2   • O2 (OXYGEN) Inhale 2 L/min Every Night. Uses 2L  overnight only     • omeprazole (priLOSEC) 40 MG capsule TAKE ONE CAPSULE BY MOUTH EVERY DAY 90 capsule 1   • potassium chloride (K-DUR) 10 MEQ CR " tablet TAKE ONE (1) TABLET ORALLY (BY MOUTH) ONCE DAILY 90 tablet 2   • promethazine (PHENERGAN) 12.5 MG tablet TABLET ONE-HALF TABLET TO ONE TABLET BY MOUTH EVERY 6 HOURS AS NEEDED FOR NAUSEA 20 tablet 0   • ULTICARE MINI PEN NEEDLES 31G X 6 MM misc USE TO INJECT INSULINS 4 TIMES DAILY AS DIRECTED 400 each 3   • DULoxetine (CYMBALTA) 30 MG capsule TAKE ONE CAPSULE BY MOUTH TWICE DAILY 60 capsule 5     No facility-administered medications prior to visit.      New Medications Ordered This Visit   Medications   • desvenlafaxine (PRISTIQ) 50 MG 24 hr tablet     Sig: Take 1 tablet by mouth Daily.     Dispense:  30 tablet     Refill:  0     [unfilled]  Medications Discontinued During This Encounter   Medication Reason   • DULoxetine (CYMBALTA) 30 MG capsule          Return in about 1 month (around 10/3/2019) for Recheck.

## 2019-09-03 NOTE — PATIENT INSTRUCTIONS
Medicare Wellness  Personal Prevention Plan of Service     Date of Office Visit:  2019  Encounter Provider:  Chari Mercado MD  Place of Service:  [facility]  Patient Name: Joya NOBLES:  1940    As part of the Medicare Wellness portion of your visit today, we are providing you with this personalized preventive plan of services (PPPS). This plan is based upon recommendations of the [place] Preventive Services Task Force (USPSTF) and the Advisory Committee on Immunization Practices (ACIP).    This lists the preventive care services that should be considered, and provides dates of when you are due. Items listed as completed are up-to-date and do not require any further intervention.    Health Maintenance   Topic Date Due   • TDAP/TD VACCINES (1 - Tdap) 04/15/1961   • ZOSTER VACCINE (1 of 2) 04/15/1992   • MAMMOGRAM  2016   • URINE MICROALBUMIN  2019   • INFLUENZA VACCINE  2019   • DXA SCAN  2019   • HEMOGLOBIN A1C  2019   • LIPID PANEL  2020   • MEDICARE ANNUAL WELLNESS  2020   • COLONOSCOPY  10/28/2028   • PNEUMOCOCCAL VACCINES (65+ LOW/MEDIUM RISK)  Completed       Orders Placed This Encounter   Procedures   • Mammo Screening Bilateral With CAD     Standing Status:   Future     Standing Expiration Date:   9/3/2020     Order Specific Question:   Reason for Exam:     Answer:   screening   • DEXA Bone Density Axial     Standing Status:   Future     Standing Expiration Date:   9/3/2020     Order Specific Question:   Reason for Exam:     Answer:   screening   • Fluzone High Dose =>65Years   • POCT hemoglobin   • POCT microalbumin       Return in about 1 month (around 10/3/2019) for Recheck.

## 2019-09-05 ENCOUNTER — LAB (OUTPATIENT)
Dept: LAB | Facility: HOSPITAL | Age: 79
End: 2019-09-05

## 2019-09-05 ENCOUNTER — INFUSION (OUTPATIENT)
Dept: ONCOLOGY | Facility: HOSPITAL | Age: 79
End: 2019-09-05

## 2019-09-05 DIAGNOSIS — D64.9 CHRONIC ANEMIA: Primary | ICD-10-CM

## 2019-09-05 DIAGNOSIS — D53.9 MACROCYTIC ANEMIA: ICD-10-CM

## 2019-09-05 LAB
BASOPHILS # BLD AUTO: 0.08 10*3/MM3 (ref 0–0.2)
BASOPHILS NFR BLD AUTO: 1.4 % (ref 0–1.5)
DEPRECATED RDW RBC AUTO: 76.9 FL (ref 37–54)
EOSINOPHIL # BLD AUTO: 0.37 10*3/MM3 (ref 0–0.4)
EOSINOPHIL NFR BLD AUTO: 6.6 % (ref 0.3–6.2)
ERYTHROCYTE [DISTWIDTH] IN BLOOD BY AUTOMATED COUNT: 20.5 % (ref 12.3–15.4)
HCT VFR BLD AUTO: 29.9 % (ref 34–46.6)
HGB BLD-MCNC: 10.2 G/DL (ref 12–15.9)
IMM GRANULOCYTES # BLD AUTO: 0.03 10*3/MM3 (ref 0–0.05)
IMM GRANULOCYTES NFR BLD AUTO: 0.5 % (ref 0–0.5)
LYMPHOCYTES # BLD AUTO: 2.54 10*3/MM3 (ref 0.7–3.1)
LYMPHOCYTES NFR BLD AUTO: 45.4 % (ref 19.6–45.3)
MCH RBC QN AUTO: 35.1 PG (ref 26.6–33)
MCHC RBC AUTO-ENTMCNC: 34.1 G/DL (ref 31.5–35.7)
MCV RBC AUTO: 102.7 FL (ref 79–97)
MONOCYTES # BLD AUTO: 0.19 10*3/MM3 (ref 0.1–0.9)
MONOCYTES NFR BLD AUTO: 3.4 % (ref 5–12)
NEUTROPHILS # BLD AUTO: 2.38 10*3/MM3 (ref 1.7–7)
NEUTROPHILS NFR BLD AUTO: 42.7 % (ref 42.7–76)
NRBC BLD AUTO-RTO: 0 /100 WBC (ref 0–0.2)
PLATELET # BLD AUTO: 359 10*3/MM3 (ref 140–450)
PMV BLD AUTO: 11.9 FL (ref 6–12)
RBC # BLD AUTO: 2.91 10*6/MM3 (ref 3.77–5.28)
WBC NRBC COR # BLD: 5.59 10*3/MM3 (ref 3.4–10.8)

## 2019-09-05 PROCEDURE — 85025 COMPLETE CBC W/AUTO DIFF WBC: CPT | Performed by: INTERNAL MEDICINE

## 2019-09-05 PROCEDURE — 36415 COLL VENOUS BLD VENIPUNCTURE: CPT

## 2019-09-11 ENCOUNTER — HOSPITAL ENCOUNTER (OUTPATIENT)
Dept: MAMMOGRAPHY | Facility: HOSPITAL | Age: 79
Discharge: HOME | End: 2019-09-11
Admitting: INTERNAL MEDICINE

## 2019-09-11 ENCOUNTER — APPOINTMENT (OUTPATIENT)
Dept: BONE DENSITY | Facility: HOSPITAL | Age: 79
End: 2019-09-11

## 2019-09-11 ENCOUNTER — TELEPHONE (OUTPATIENT)
Dept: INTERNAL MEDICINE | Facility: CLINIC | Age: 79
End: 2019-09-11

## 2019-09-11 DIAGNOSIS — M62.838 MUSCLE SPASM: ICD-10-CM

## 2019-09-11 DIAGNOSIS — N63.0 BREAST NODULE: Primary | ICD-10-CM

## 2019-09-11 DIAGNOSIS — R92.8 OTHER ABNORMAL AND INCONCLUSIVE FINDINGS ON DIAGNOSTIC IMAGING OF BREAST: ICD-10-CM

## 2019-09-11 DIAGNOSIS — Z12.31 BREAST CANCER SCREENING BY MAMMOGRAM: ICD-10-CM

## 2019-09-11 DIAGNOSIS — Z78.0 POST-MENOPAUSAL: ICD-10-CM

## 2019-09-11 DIAGNOSIS — C50.911 NEOPLASM OF RIGHT BREAST, PRIMARY TUMOR STAGING CATEGORY T4B: ULCERATION AND/OR IPSILATERAL SATELLITE NODULES AND/OR EDEMA (INCLUDING PEAU D'ORANGE) OF SKIN, EXCLUDING INFLAMMATORY CARCINOMA (HCC): ICD-10-CM

## 2019-09-11 PROCEDURE — 77063 BREAST TOMOSYNTHESIS BI: CPT

## 2019-09-11 PROCEDURE — 77067 SCR MAMMO BI INCL CAD: CPT

## 2019-09-11 PROCEDURE — 77080 DXA BONE DENSITY AXIAL: CPT

## 2019-09-11 RX ORDER — CYCLOBENZAPRINE HCL 10 MG
TABLET ORAL
Qty: 30 TABLET | Refills: 0 | Status: ON HOLD | OUTPATIENT
Start: 2019-09-11 | End: 2020-07-29

## 2019-09-11 NOTE — TELEPHONE ENCOUNTER
Patient advised and order put in. Will get done asap.    ----- Message from Chari Mercado MD sent at 9/11/2019 10:40 AM EDT -----  Call pt about mammo - more imaging requested.  Needs right diagnostic mammo with u/s.

## 2019-09-12 ENCOUNTER — INFUSION (OUTPATIENT)
Dept: ONCOLOGY | Facility: HOSPITAL | Age: 79
End: 2019-09-12

## 2019-09-12 ENCOUNTER — LAB (OUTPATIENT)
Dept: LAB | Facility: HOSPITAL | Age: 79
End: 2019-09-12

## 2019-09-12 VITALS
SYSTOLIC BLOOD PRESSURE: 131 MMHG | HEART RATE: 82 BPM | TEMPERATURE: 97.9 F | OXYGEN SATURATION: 96 % | DIASTOLIC BLOOD PRESSURE: 91 MMHG

## 2019-09-12 DIAGNOSIS — E11.22 CKD STAGE 3 DUE TO TYPE 2 DIABETES MELLITUS (HCC): ICD-10-CM

## 2019-09-12 DIAGNOSIS — D64.9 ANEMIA REQUIRING TRANSFUSIONS: ICD-10-CM

## 2019-09-12 DIAGNOSIS — D47.2 MONOCLONAL GAMMOPATHY OF UNKNOWN SIGNIFICANCE (MGUS): Primary | ICD-10-CM

## 2019-09-12 DIAGNOSIS — N18.30 CKD STAGE 3 DUE TO TYPE 2 DIABETES MELLITUS (HCC): ICD-10-CM

## 2019-09-12 LAB
BASOPHILS # BLD AUTO: 0.1 10*3/MM3 (ref 0–0.2)
BASOPHILS NFR BLD AUTO: 1.8 % (ref 0–1.5)
DEPRECATED RDW RBC AUTO: 76.2 FL (ref 37–54)
EOSINOPHIL # BLD AUTO: 0.33 10*3/MM3 (ref 0–0.4)
EOSINOPHIL NFR BLD AUTO: 6.1 % (ref 0.3–6.2)
ERYTHROCYTE [DISTWIDTH] IN BLOOD BY AUTOMATED COUNT: 20.4 % (ref 12.3–15.4)
HCT VFR BLD AUTO: 26.9 % (ref 34–46.6)
HGB BLD-MCNC: 9 G/DL (ref 12–15.9)
IMM GRANULOCYTES # BLD AUTO: 0.18 10*3/MM3 (ref 0–0.05)
IMM GRANULOCYTES NFR BLD AUTO: 3.3 % (ref 0–0.5)
LYMPHOCYTES # BLD AUTO: 2.08 10*3/MM3 (ref 0.7–3.1)
LYMPHOCYTES NFR BLD AUTO: 38.4 % (ref 19.6–45.3)
MCH RBC QN AUTO: 34.5 PG (ref 26.6–33)
MCHC RBC AUTO-ENTMCNC: 33.5 G/DL (ref 31.5–35.7)
MCV RBC AUTO: 103.1 FL (ref 79–97)
MONOCYTES # BLD AUTO: 0.26 10*3/MM3 (ref 0.1–0.9)
MONOCYTES NFR BLD AUTO: 4.8 % (ref 5–12)
NEUTROPHILS # BLD AUTO: 2.47 10*3/MM3 (ref 1.7–7)
NEUTROPHILS NFR BLD AUTO: 45.6 % (ref 42.7–76)
NRBC BLD AUTO-RTO: 0.7 /100 WBC (ref 0–0.2)
PLATELET # BLD AUTO: 310 10*3/MM3 (ref 140–450)
PMV BLD AUTO: 11.7 FL (ref 6–12)
RBC # BLD AUTO: 2.61 10*6/MM3 (ref 3.77–5.28)
WBC NRBC COR # BLD: 5.42 10*3/MM3 (ref 3.4–10.8)

## 2019-09-12 PROCEDURE — 25010000002 EPOETIN ALFA PER 1000 UNITS: Performed by: NURSE PRACTITIONER

## 2019-09-12 PROCEDURE — 36415 COLL VENOUS BLD VENIPUNCTURE: CPT | Performed by: NURSE PRACTITIONER

## 2019-09-12 PROCEDURE — 85025 COMPLETE CBC W/AUTO DIFF WBC: CPT | Performed by: INTERNAL MEDICINE

## 2019-09-12 PROCEDURE — 96372 THER/PROPH/DIAG INJ SC/IM: CPT | Performed by: NURSE PRACTITIONER

## 2019-09-12 RX ADMIN — ERYTHROPOIETIN 60000 UNITS: 40000 INJECTION, SOLUTION INTRAVENOUS; SUBCUTANEOUS at 10:24

## 2019-09-16 ENCOUNTER — TELEPHONE (OUTPATIENT)
Dept: INTERNAL MEDICINE | Facility: CLINIC | Age: 79
End: 2019-09-16

## 2019-09-16 ENCOUNTER — APPOINTMENT (OUTPATIENT)
Dept: GENERAL RADIOLOGY | Facility: HOSPITAL | Age: 79
End: 2019-09-16

## 2019-09-16 ENCOUNTER — HOSPITAL ENCOUNTER (OUTPATIENT)
Facility: HOSPITAL | Age: 79
Setting detail: OBSERVATION
Discharge: NURSING, REHAB OR LONG-TERM CARE | End: 2019-09-20
Attending: EMERGENCY MEDICINE | Admitting: INTERNAL MEDICINE

## 2019-09-16 DIAGNOSIS — Z79.4 TYPE 2 DIABETES MELLITUS WITH DIABETIC NEUROPATHY, WITH LONG-TERM CURRENT USE OF INSULIN (HCC): ICD-10-CM

## 2019-09-16 DIAGNOSIS — E11.40 TYPE 2 DIABETES MELLITUS WITH DIABETIC NEUROPATHY, WITH LONG-TERM CURRENT USE OF INSULIN (HCC): ICD-10-CM

## 2019-09-16 DIAGNOSIS — F32.A ANXIETY AND DEPRESSION: ICD-10-CM

## 2019-09-16 DIAGNOSIS — M25.561 RIGHT KNEE PAIN, UNSPECIFIED CHRONICITY: Primary | ICD-10-CM

## 2019-09-16 DIAGNOSIS — F41.9 ANXIETY AND DEPRESSION: ICD-10-CM

## 2019-09-16 PROBLEM — M25.569 KNEE PAIN: Status: ACTIVE | Noted: 2019-09-16

## 2019-09-16 LAB
GLUCOSE BLDC GLUCOMTR-MCNC: 150 MG/DL (ref 70–130)
GLUCOSE BLDC GLUCOMTR-MCNC: 278 MG/DL (ref 70–130)

## 2019-09-16 PROCEDURE — 99284 EMERGENCY DEPT VISIT MOD MDM: CPT

## 2019-09-16 PROCEDURE — G0378 HOSPITAL OBSERVATION PER HR: HCPCS

## 2019-09-16 PROCEDURE — 73560 X-RAY EXAM OF KNEE 1 OR 2: CPT

## 2019-09-16 PROCEDURE — 99284 EMERGENCY DEPT VISIT MOD MDM: CPT | Performed by: EMERGENCY MEDICINE

## 2019-09-16 PROCEDURE — 73590 X-RAY EXAM OF LOWER LEG: CPT

## 2019-09-16 PROCEDURE — 63710000001 INSULIN DETEMIR PER 5 UNITS: Performed by: HOSPITALIST

## 2019-09-16 PROCEDURE — 82962 GLUCOSE BLOOD TEST: CPT

## 2019-09-16 PROCEDURE — 99218 PR INITIAL OBSERVATION CARE/DAY 30 MINUTES: CPT | Performed by: HOSPITALIST

## 2019-09-16 PROCEDURE — 63710000001 DIPHENHYDRAMINE PER 50 MG: Performed by: HOSPITALIST

## 2019-09-16 PROCEDURE — 25010000002 ENOXAPARIN PER 10 MG: Performed by: HOSPITALIST

## 2019-09-16 PROCEDURE — 96372 THER/PROPH/DIAG INJ SC/IM: CPT

## 2019-09-16 RX ORDER — BENZONATATE 100 MG/1
200 CAPSULE ORAL 3 TIMES DAILY PRN
Status: DISCONTINUED | OUTPATIENT
Start: 2019-09-16 | End: 2019-09-20 | Stop reason: HOSPADM

## 2019-09-16 RX ORDER — PROMETHAZINE HYDROCHLORIDE 12.5 MG/1
6.25 TABLET ORAL EVERY 6 HOURS PRN
Status: DISCONTINUED | OUTPATIENT
Start: 2019-09-16 | End: 2019-09-20 | Stop reason: HOSPADM

## 2019-09-16 RX ORDER — NYSTATIN 100000 [USP'U]/G
POWDER TOPICAL EVERY 8 HOURS SCHEDULED
Status: DISCONTINUED | OUTPATIENT
Start: 2019-09-16 | End: 2019-09-20 | Stop reason: HOSPADM

## 2019-09-16 RX ORDER — ATORVASTATIN CALCIUM 10 MG/1
10 TABLET, FILM COATED ORAL NIGHTLY
Status: DISCONTINUED | OUTPATIENT
Start: 2019-09-16 | End: 2019-09-20 | Stop reason: HOSPADM

## 2019-09-16 RX ORDER — DIPHENHYDRAMINE HCL 25 MG
25 CAPSULE ORAL EVERY 6 HOURS PRN
Status: DISCONTINUED | OUTPATIENT
Start: 2019-09-16 | End: 2019-09-20 | Stop reason: HOSPADM

## 2019-09-16 RX ORDER — SODIUM CHLORIDE 0.9 % (FLUSH) 0.9 %
10 SYRINGE (ML) INJECTION EVERY 12 HOURS SCHEDULED
Status: DISCONTINUED | OUTPATIENT
Start: 2019-09-16 | End: 2019-09-20 | Stop reason: HOSPADM

## 2019-09-16 RX ORDER — LEVOTHYROXINE SODIUM 88 UG/1
88 TABLET ORAL EVERY MORNING
Status: DISCONTINUED | OUTPATIENT
Start: 2019-09-16 | End: 2019-09-18

## 2019-09-16 RX ORDER — ALBUTEROL SULFATE 90 UG/1
2 AEROSOL, METERED RESPIRATORY (INHALATION) EVERY 4 HOURS PRN
Status: DISCONTINUED | OUTPATIENT
Start: 2019-09-16 | End: 2019-09-16 | Stop reason: CLARIF

## 2019-09-16 RX ORDER — FLUTICASONE PROPIONATE 50 MCG
2 SPRAY, SUSPENSION (ML) NASAL DAILY
Status: DISCONTINUED | OUTPATIENT
Start: 2019-09-16 | End: 2019-09-20 | Stop reason: HOSPADM

## 2019-09-16 RX ORDER — MIDODRINE HYDROCHLORIDE 5 MG/1
2.5 TABLET ORAL 3 TIMES DAILY
Status: DISCONTINUED | OUTPATIENT
Start: 2019-09-16 | End: 2019-09-20 | Stop reason: HOSPADM

## 2019-09-16 RX ORDER — PHENOL 1.4 %
1250 AEROSOL, SPRAY (ML) MUCOUS MEMBRANE DAILY
Status: DISCONTINUED | OUTPATIENT
Start: 2019-09-16 | End: 2019-09-16 | Stop reason: CLARIF

## 2019-09-16 RX ORDER — MULTIPLE VITAMINS W/ MINERALS TAB 9MG-400MCG
1 TAB ORAL DAILY
Status: DISCONTINUED | OUTPATIENT
Start: 2019-09-16 | End: 2019-09-20 | Stop reason: HOSPADM

## 2019-09-16 RX ORDER — ALBUTEROL SULFATE 2.5 MG/3ML
2.5 SOLUTION RESPIRATORY (INHALATION) EVERY 4 HOURS PRN
Status: DISCONTINUED | OUTPATIENT
Start: 2019-09-16 | End: 2019-09-20 | Stop reason: HOSPADM

## 2019-09-16 RX ORDER — POTASSIUM CHLORIDE 750 MG/1
10 TABLET, EXTENDED RELEASE ORAL DAILY
Status: DISCONTINUED | OUTPATIENT
Start: 2019-09-16 | End: 2019-09-20 | Stop reason: HOSPADM

## 2019-09-16 RX ORDER — CALCIUM CARBONATE 200(500)MG
2 TABLET,CHEWABLE ORAL DAILY
Status: DISCONTINUED | OUTPATIENT
Start: 2019-09-16 | End: 2019-09-20 | Stop reason: HOSPADM

## 2019-09-16 RX ORDER — SODIUM CHLORIDE 0.9 % (FLUSH) 0.9 %
10 SYRINGE (ML) INJECTION AS NEEDED
Status: DISCONTINUED | OUTPATIENT
Start: 2019-09-16 | End: 2019-09-20 | Stop reason: HOSPADM

## 2019-09-16 RX ORDER — HYDROCODONE BITARTRATE AND ACETAMINOPHEN 5; 325 MG/1; MG/1
1 TABLET ORAL ONCE
Status: COMPLETED | OUTPATIENT
Start: 2019-09-16 | End: 2019-09-16

## 2019-09-16 RX ORDER — GABAPENTIN 400 MG/1
400 CAPSULE ORAL EVERY 8 HOURS SCHEDULED
Status: DISCONTINUED | OUTPATIENT
Start: 2019-09-16 | End: 2019-09-20 | Stop reason: HOSPADM

## 2019-09-16 RX ORDER — DESVENLAFAXINE SUCCINATE 50 MG/1
TABLET, EXTENDED RELEASE ORAL
Qty: 30 TABLET | Refills: 0 | Status: SHIPPED | OUTPATIENT
Start: 2019-09-16 | End: 2019-09-16

## 2019-09-16 RX ORDER — HYDROCODONE BITARTRATE AND ACETAMINOPHEN 5; 325 MG/1; MG/1
1 TABLET ORAL EVERY 8 HOURS PRN
Status: DISCONTINUED | OUTPATIENT
Start: 2019-09-16 | End: 2019-09-16

## 2019-09-16 RX ORDER — ACETAMINOPHEN 325 MG/1
650 TABLET ORAL EVERY 6 HOURS PRN
Status: DISCONTINUED | OUTPATIENT
Start: 2019-09-16 | End: 2019-09-20 | Stop reason: HOSPADM

## 2019-09-16 RX ORDER — HYDROCODONE BITARTRATE AND ACETAMINOPHEN 5; 325 MG/1; MG/1
1 TABLET ORAL EVERY 6 HOURS PRN
Status: DISCONTINUED | OUTPATIENT
Start: 2019-09-16 | End: 2019-09-18

## 2019-09-16 RX ORDER — FUROSEMIDE 20 MG/1
20 TABLET ORAL DAILY
Status: DISCONTINUED | OUTPATIENT
Start: 2019-09-16 | End: 2019-09-20 | Stop reason: HOSPADM

## 2019-09-16 RX ORDER — SODIUM CHLORIDE 9 MG/ML
40 INJECTION, SOLUTION INTRAVENOUS AS NEEDED
Status: DISCONTINUED | OUTPATIENT
Start: 2019-09-16 | End: 2019-09-20 | Stop reason: HOSPADM

## 2019-09-16 RX ORDER — PANTOPRAZOLE SODIUM 40 MG/1
40 TABLET, DELAYED RELEASE ORAL EVERY MORNING
Status: DISCONTINUED | OUTPATIENT
Start: 2019-09-17 | End: 2019-09-20 | Stop reason: HOSPADM

## 2019-09-16 RX ORDER — DESVENLAFAXINE SUCCINATE 50 MG/1
50 TABLET, EXTENDED RELEASE ORAL DAILY
Status: DISCONTINUED | OUTPATIENT
Start: 2019-09-16 | End: 2019-09-20 | Stop reason: HOSPADM

## 2019-09-16 RX ADMIN — INSULIN DETEMIR 30 UNITS: 100 INJECTION, SOLUTION SUBCUTANEOUS at 22:16

## 2019-09-16 RX ADMIN — SODIUM CHLORIDE, PRESERVATIVE FREE 10 ML: 5 INJECTION INTRAVENOUS at 22:18

## 2019-09-16 RX ADMIN — NYSTATIN: 100000 POWDER TOPICAL at 22:19

## 2019-09-16 RX ADMIN — MIDODRINE HYDROCHLORIDE 2.5 MG: 5 TABLET ORAL at 22:17

## 2019-09-16 RX ADMIN — DIPHENHYDRAMINE HYDROCHLORIDE 25 MG: 25 CAPSULE ORAL at 22:18

## 2019-09-16 RX ADMIN — ANTACID TABLETS 2 TABLET: 500 TABLET, CHEWABLE ORAL at 18:40

## 2019-09-16 RX ADMIN — HYDROCODONE BITARTRATE AND ACETAMINOPHEN 1 TABLET: 5; 325 TABLET ORAL at 16:38

## 2019-09-16 RX ADMIN — GABAPENTIN 400 MG: 400 CAPSULE ORAL at 22:18

## 2019-09-16 RX ADMIN — HYDROCODONE BITARTRATE AND ACETAMINOPHEN 1 TABLET: 5; 325 TABLET ORAL at 22:17

## 2019-09-16 RX ADMIN — HYDROCODONE BITARTRATE AND ACETAMINOPHEN 1 TABLET: 5; 325 TABLET ORAL at 15:09

## 2019-09-16 RX ADMIN — ATORVASTATIN CALCIUM 10 MG: 10 TABLET, FILM COATED ORAL at 22:17

## 2019-09-16 RX ADMIN — ENOXAPARIN SODIUM 40 MG: 40 INJECTION SUBCUTANEOUS at 18:37

## 2019-09-16 NOTE — ED PROVIDER NOTES
Subjective   79-year-old female presents after acute onset of right knee pain while walking.  Patient does report history of knee pain and states her pain started becoming worse in her right knee 2 days ago.  No specific injury at that time.  Patient states today that she was walking and felt a pop in the back of her knee which caused her to fall backwards into her chair.  No other injuries from fall.  Now complaining of pain primarily to lateral and posterior aspects of right knee.  Also has some pain distal to this.  Patient has some baseline numbness to this extremity which she reports is unchanged.  No previous surgeries to this extremity.  Has otherwise been feeling well recently.            Review of Systems   Constitutional: Negative.    HENT: Negative.    Eyes: Negative.    Respiratory: Negative.    Cardiovascular: Negative.    Gastrointestinal: Negative.    Endocrine: Negative.    Genitourinary: Negative.    Musculoskeletal: Positive for arthralgias and joint swelling.   Skin: Negative.    Allergic/Immunologic: Negative.    Neurological: Negative.    Psychiatric/Behavioral: Negative.        Past Medical History:   Diagnosis Date   • Allergic rhinitis    • Anemia    • Anxiety    • Appetite absent    • Arthritis    • Asthma    • Back pain    • Bell's palsy    • Black tarry stools    • Blood in stool    • Chronic fatigue    • CKD (chronic kidney disease) stage 3, GFR 30-59 ml/min (CMS/[facility])    • Community acquired pneumonia of left lung 10/25/2018   • Cough    • Depression    • Diabetes mellitus (CMS/[facility])     LAST A1C 6   • Diabetic gastroparesis (CMS/[facility]) 2/19/2016   • Difficulty walking    • Excessive urination at night    • Frequent urination    • GERD (gastroesophageal reflux disease)    • GI bleed    • Gout    • H/O blood clots     LEFT LEG 7 OR 8 YEARS AGO   • Heat intolerance    • History of fall 10/2018   • History of prior pregnancies     x8, miscarriage 5   • History of transfusion 11/2018    due to  anemia   • Hyperlipidemia    • Hypertension    • Hypothyroidism    • Normal coronary arteries     by cath 2013   • Orthostatic hypotension    • AARON (obstructive sleep apnea)     DOESNT WEAR REGULARLY   • PONV (postoperative nausea and vomiting)    • Skin cancer    • Stroke (CMS/HCC)     Several mini-strokes   • TIA (transient ischemic attack)     LAST TIA JULY 2017   • Urination pain    • UTI (urinary tract infection)     Dec 2018 and Jan 2019       Allergies   Allergen Reactions   • Baclofen Anxiety     Panic attack, nightmares   • Eliquis [Apixaban] Anaphylaxis   • Codeine Itching and Rash   • Lisinopril Cough   • Morphine Hives   • Penicillins Rash     Tolerates cephalosporins        Past Surgical History:   Procedure Laterality Date   • BACK SURGERY      HARDWARE   • CHOLECYSTECTOMY      OPEN   • COLONOSCOPY  2011    due for repeat in 2021   • COLONOSCOPY N/A 10/28/2018    Procedure: COLONOSCOPY;  Surgeon: Emmanuel Rogers MD;  Location: [facility] OR;  Service: Gastroenterology   • ENDOSCOPY N/A 10/26/2018    Procedure: ESOPHAGOGASTRODUODENOSCOPY;  Surgeon: Emmanuel Rogers MD;  Location: [facility] OR;  Service: Gastroenterology   • HYSTERECTOMY      PARTIAL    • KNEE SURGERY     • NECK SURGERY     • SPINE SURGERY     • TUMOR REMOVAL Left     Leg   • UPPER GASTROINTESTINAL ENDOSCOPY  2014    gastritis.  done by dr. hastings       Family History   Problem Relation Age of Onset   • Lupus Mother    • Heart failure Mother 59   • Heart disease Other    • Hypertension Other    • Heart attack Father    • Breast cancer Neg Hx        Social History     Socioeconomic History   • Marital status:      Spouse name: Not on file   • Number of children: 3   • Years of education: High School   • Highest education level: Not on file   Occupational History     Employer: RETIRED   Tobacco Use   • Smoking status: Never Smoker   • Smokeless tobacco: Never Used   • Tobacco comment: CAFFEINE USE: NONE   Substance  and Sexual Activity   • Alcohol use: No   • Drug use: No   • Sexual activity: Defer     Comment: EXERCISE - RARELY           Objective   Physical Exam   Constitutional: She appears well-developed and well-nourished. No distress.   HENT:   Head: Normocephalic and atraumatic.   Eyes: EOM are normal. Pupils are equal, round, and reactive to light.   Neck: Normal range of motion. Neck supple.   Cardiovascular: Normal rate and regular rhythm.   Pulmonary/Chest: Effort normal and breath sounds normal.   Abdominal: Soft. She exhibits no distension. There is no tenderness.   Musculoskeletal:   Tenderness, warmth, swelling to right knee.  No obvious deformities.  Range of motion limited by pain.  Patient will range ankle and wiggle toes.  Does have sensation to light touch to right foot but states she cannot feel toes.  Foot well-perfused.  2+ DP pulse.  Normal capillary refill to toes.  Remainder of extremities unremarkable.   Skin: She is not diaphoretic.       Procedures           ED Course                  MDM  Number of Diagnoses or Management Options  Right knee pain, unspecified chronicity:   Diagnosis management comments: X-ray of right knee and right tib-fib negative for fracture dislocation.  Patient given Norco x2 and still unable to bear weight.  Cannot transfer even with assistance of walker.  Patient and family requesting admission to hospital as they do not feel they can care for patient in her current state of pain.  Discussed case with hospitalist who is willing to admit to observation for PT eval.  Family states they are going to work on building wheelchair ramp tomorrow.      Final diagnoses:   Right knee pain, unspecified chronicity              Christos Lizama MD  09/16/19 [id number]

## 2019-09-16 NOTE — TELEPHONE ENCOUNTER
----- Message from Heydi Germanialouise sent at 9/16/2019 11:22 AM EDT -----  Contact: patient  ÁLVARO    Patient calling to say she was getting her bath and gathering her clothes and when going back to room something bad happened to her right leg, like a snap.    Her son is coming to take her to the ER.

## 2019-09-16 NOTE — H&P
[facility] HOSPITALIST     Chari Mercado MD    CHIEF COMPLAINT: Right knee pain    HISTORY OF PRESENT ILLNESS:    This is a 79-year-old female who presented to the emergency room with right knee pain.  She has been falling a lot because she is unsteady on her feet and weak.  She tells the ER she was walking and felt a pop in the back of her knee which caused her to fall backwards into her chair.  Now the pain is so severe that she cannot stand or walk.  X-rays were done in ER and they were negative for fracture.  We have placed the patient in observation and will get an orthopedic consult and a physical therapy and Occupational Therapy consult tomorrow.  She has her own walker and a wheelchair at home.  She already is on Norco at home as needed for pain.  She complains of arthralgias and joint swelling.  She is not having nausea or vomiting or diarrhea.  She does have recurrent dysuria and incontinence.  She is not having chest pain cough or fever.      Past Medical History:   Diagnosis Date   • Allergic rhinitis    • Anemia    • Anxiety    • Appetite absent    • Arthritis    • Asthma    • Back pain    • Bell's palsy    • Black tarry stools    • Blood in stool    • Chronic fatigue    • CKD (chronic kidney disease) stage 3, GFR 30-59 ml/min (CMS/[facility])    • Community acquired pneumonia of left lung 10/25/2018   • Cough    • Depression    • Diabetes mellitus (CMS/[facility])     LAST A1C 6   • Diabetic gastroparesis (CMS/[facility]) 2/19/2016   • Difficulty walking    • Excessive urination at night    • Frequent urination    • GERD (gastroesophageal reflux disease)    • GI bleed    • Gout    • H/O blood clots     LEFT LEG 7 OR 8 YEARS AGO   • Heat intolerance    • History of fall 10/2018   • History of prior pregnancies     x8, miscarriage 5   • History of transfusion 11/2018    due to anemia   • Hyperlipidemia    • Hypertension    • Hypothyroidism    • Normal coronary arteries     by cath 2013   •  Orthostatic hypotension    • AARON (obstructive sleep apnea)     DOESNT WEAR REGULARLY   • PONV (postoperative nausea and vomiting)    • Skin cancer    • Stroke (CMS/HCC)     Several mini-strokes   • TIA (transient ischemic attack)     LAST TIA JULY 2017   • Urination pain    • UTI (urinary tract infection)     Dec 2018 and Jan 2019     Past Surgical History:   Procedure Laterality Date   • BACK SURGERY      HARDWARE   • CHOLECYSTECTOMY      OPEN   • COLONOSCOPY  2011    due for repeat in 2021   • COLONOSCOPY N/A 10/28/2018    Procedure: COLONOSCOPY;  Surgeon: Emmanuel Rogers MD;  Location: [facility] OR;  Service: Gastroenterology   • ENDOSCOPY N/A 10/26/2018    Procedure: ESOPHAGOGASTRODUODENOSCOPY;  Surgeon: Emmanuel Rogers MD;  Location: [facility] OR;  Service: Gastroenterology   • HYSTERECTOMY      PARTIAL    • KNEE SURGERY     • NECK SURGERY     • SPINE SURGERY     • TUMOR REMOVAL Left     Leg   • UPPER GASTROINTESTINAL ENDOSCOPY  2014    gastritis.  done by dr. hastings     Family History   Problem Relation Age of Onset   • Lupus Mother    • Heart failure Mother 59   • Heart disease Other    • Hypertension Other    • Heart attack Father    • Breast cancer Neg Hx      Social History     Tobacco Use   • Smoking status: Never Smoker   • Smokeless tobacco: Never Used   • Tobacco comment: CAFFEINE USE: NONE   Substance Use Topics   • Alcohol use: No   • Drug use: No     Medications Prior to Admission   Medication Sig Dispense Refill Last Dose   • ACCU-CHEK FASTCLIX LANCETS misc TEST 3-4 TIMES DAILY AS DIRECTED 400 each 3 9/15/2019 at Unknown time   • ACCU-CHEK SMARTVIEW test strip TEST BLOOD SUGAR THREE TIMES DAILY OR AS DIRECTED 300 each 3 9/15/2019 at Unknown time   • acetaminophen (TYLENOL) 325 MG tablet Take 2 tablets by mouth Every 6 (Six) Hours As Needed for Mild Pain . OTC product 40 tablet 0 9/15/2019 at Unknown time   • albuterol sulfate  (90 Base) MCG/ACT inhaler Inhale 2 puffs Every  4 (Four) Hours As Needed for Wheezing. 1 inhaler 3 Past Week at Unknown time   • Alcohol Swabs (B-D SINGLE USE SWABS REGULAR) pads    9/15/2019 at Unknown time   • atorvastatin (LIPITOR) 10 MG tablet TAKE ONE TABLET BY MOUTH AT BEDTIME 90 tablet 1 9/15/2019 at Unknown time   • benzonatate (TESSALON) 200 MG capsule TAKE ONE CAPSULE BY MOUTH THREE TIMES DAILY AS NEEDED FOR COUGH 20 capsule 0 9/15/2019 at Unknown time   • Calcium Carbonate-Vit D-Min (CALCIUM 1200) 4224-4635 MG-UNIT chewable tablet Chew 1 tablet Daily.   9/15/2019 at Unknown time   • cyclobenzaprine (FLEXERIL) 10 MG tablet TAKE ONE TABLET BY MOUTH TWICE DAILY as needed for muscle spasms 30 tablet 0 9/15/2019 at Unknown time   • diphenhydrAMINE (BENADRYL) 25 mg capsule Take 25 mg by mouth Every 6 (Six) Hours As Needed for Itching.   9/15/2019 at Unknown time   • fluticasone (FLONASE) 50 MCG/ACT nasal spray 2 sprays into the nostril(s) as directed by provider Daily. 16 g 0 9/15/2019 at Unknown time   • furosemide (LASIX) 20 MG tablet TAKE ONE (1) TABLET ORALLY (BY MOUTH) ONCE DAILY 90 tablet 1 9/15/2019 at Unknown time   • gabapentin (NEURONTIN) 400 MG capsule TAKE ONE CAPSULE BY MOUTH EVERY MORNING, ONE AT NOON, AND TWO AT BEDTIME 120 capsule 5 9/15/2019 at Unknown time   • HYDROcodone-acetaminophen (NORCO) 5-325 MG per tablet Take one po bid    Prn  Moderate pain 60 tablet 0 9/15/2019 at Unknown time   • insulin aspart (novoLOG) 100 UNIT/ML injection Inject 15 Units under the skin into the appropriate area as directed Daily With Breakfast. 15 units with breakfast 10 units with lunch 10 units with dinner   9/15/2019 at Unknown time   • Insulin Degludec (TRESIBA FLEXTOUCH) 200 UNIT/ML solution pen-injector Inject 54 Units under the skin into the appropriate area as directed every night at bedtime. 9 mL 11 9/15/2019 at Unknown time   • levothyroxine (SYNTHROID, LEVOTHROID) 88 MCG tablet TAKE ONE TABLET BY MOUTH EVERY DAY 90 tablet 1 9/15/2019 at Unknown  "time   • midodrine (PROAMATINE) 2.5 MG tablet Take 1 tablet by mouth 3 (Three) Times a Day. 90 tablet 6 9/15/2019 at Unknown time   • Multiple Vitamins-Minerals (CENTRUM SILVER PO) Take  by mouth Daily.   9/15/2019 at Unknown time   • Needle, Disp, (BD DISP NEEDLES) 30G X 1/2\" misc To be used 3 times daily with Novolog Flexpen. 100 each 5 9/16/2019 at Unknown time   • NYSTATIN 515473 UNIT/GM powder APPLY TOPICALLY 3 TIMES A DAY 60 g 2 9/15/2019 at Unknown time   • O2 (OXYGEN) Inhale 2 L/min Every Night. Uses 2L  overnight only   9/15/2019 at Unknown time   • omeprazole (priLOSEC) 40 MG capsule TAKE ONE CAPSULE BY MOUTH EVERY DAY 90 capsule 1 9/15/2019 at Unknown time   • potassium chloride (K-DUR) 10 MEQ CR tablet TAKE ONE (1) TABLET ORALLY (BY MOUTH) ONCE DAILY 90 tablet 2 9/15/2019 at Unknown time   • promethazine (PHENERGAN) 12.5 MG tablet TABLET ONE-HALF TABLET TO ONE TABLET BY MOUTH EVERY 6 HOURS AS NEEDED FOR NAUSEA 20 tablet 0 Past Month at Unknown time   • ULTICARE MINI PEN NEEDLES 31G X 6 MM misc USE TO INJECT INSULINS 4 TIMES DAILY AS DIRECTED 400 each 3 9/16/2019 at Unknown time   • Blood Glucose Monitoring Suppl (ACCU-CHEK KENNETH SMARTVIEW) w/Device kit TEST blood sugar three times daily or as directed 1 kit 0 Taking   • HYDROcodone-acetaminophen (NORCO) 5-325 MG per tablet TAKE ONE TABLET every 12 hours BY MOUTH FOR PAIN 60 tablet 0 Taking     Allergies:  Baclofen; Eliquis [apixaban]; Codeine; Lisinopril; Morphine; and Penicillins    Immunization History   Administered Date(s) Administered   • Flu Mist 09/15/2015   • Flu Vaccine High Dose PF 65YR+ 09/26/2017, 09/28/2018, 09/03/2019   • Pneumococcal Polysaccharide (PPSV23) 11/06/2017       REVIEW OF SYSTEMS:  Please see the above history of present illness for pertinent positives and negatives.  The remainder of the patient's systems have been reviewed and are negative.     Vital Signs  Temp:  [98.3 °F (36.8 °C)] 98.3 °F (36.8 °C)  Heart Rate:  [80-82] " "80  Resp:  [18] 18  BP: (134-146)/(56-65) 140/62  Flowsheet Rows      First Filed Value   Admission Height  165 cm (64.96\") Documented at 09/16/2019 1326   Admission Weight  92.1 kg (203 lb) Documented at 09/16/2019 1326             Body mass index is 33.82 kg/m².    Physical Exam   Constitutional: She is oriented to person, place, and time. She appears well-nourished.   Patient states pain is tolerable as long as she is still.  Laying in bed and appears comfortable visiting with son and his wife.   HENT:   Head: Atraumatic.   Eyes: EOM are normal.   Cardiovascular: Normal rate and regular rhythm.   Pulmonary/Chest: Effort normal and breath sounds normal.   Abdominal: Soft. Bowel sounds are normal.   Musculoskeletal:   No ROM testing secondary to pain on movement.  Pulses equal in both upper and lower extremities.   Neurological: She is alert and oriented to person, place, and time.   Skin: Skin is warm and dry.   Psychiatric: She has a normal mood and affect. Her behavior is normal. Judgment and thought content normal.   Nursing note and vitals reviewed.         Results Review:    I reviewed the patient's new clinical results.  Lab Results (most recent)     Procedure Component Value Units Date/Time    POC Glucose Once [[id number]]  (Abnormal) Collected:  09/16/19 1733    Specimen:  Blood Updated:  09/16/19 1740     Glucose 150 mg/dL           Imaging Results (most recent)     Procedure Component Value Units Date/Time    XR Tibia Fibula 2 View Right [[id number]] Collected:  09/16/19 1417     Updated:  09/16/19 1420    Narrative:       XR TIBIA FIBULA 2 VW RIGHT-: 9/16/2019 2:13 PM     INDICATION:   Fall this morning with right lower leg pain.     COMPARISON:   None available.     FINDINGS:   2 views of the right tibia/fibula.  No fracture or dislocation.  No bone  erosion or destruction. Articulation at the knee and ankle is anatomic..   No foreign body. There is some lateral soft tissue swelling suggested  at the " ankle       Impression:       There may be some lateral soft tissue swelling of the ankle. Otherwise  study is normal.     This report was finalized on 9/16/2019 2:18 PM by Dr. Henry Mendiola MD.       XR Knee 1 or 2 View Right [[id number]] Collected:  09/16/19 1417     Updated:  09/16/19 1419    Narrative:       XR KNEE 1 OR 2 VW RIGHT-: 9/16/2019 2:07 PM     INDICATION:   Right knee pain since yesterday.     COMPARISON:   None available.     FINDINGS:  2 view(s) of the right knee.  No fracture, dislocation, or effusion. No  bone erosion or destruction.  No foreign body.       Impression:       Negative right knee.     This report was finalized on 9/16/2019 2:17 PM by Dr. Henry Mendiola MD.           not reviewed    ECG/EMG Results (most recent)     None        not reviewed    Assessment/Plan     Right knee pain causing immobilization secondary to the pain.   Observation and consult ortho for recommendations to get patient home to outpatient care.    DM2  Depression and anxiety  Osteoarthritis  Chronic kidney disease stage III  Diabetic gastroparesis  GERD  History of GI bleeding  HLD  Essential hypertension  Hypothyroidism  Obstructive sleep apnea/noncompliant with CPAP  Postoperative nausea and vomiting  TIA July 2017  History of recurrent urinary tract infections  Orthostatic hypotension  History of DVT left leg 8 years ago                  I discussed the patients findings and my recommendations with patient and family in the room with the patient's permission.     Renetta Lua DO  09/16/19  6:13 PM    Time: [id number]

## 2019-09-16 NOTE — PLAN OF CARE
Problem: Patient Care Overview  Goal: Plan of Care Review  Outcome: Ongoing (interventions implemented as appropriate)   09/16/19 4745   Coping/Psychosocial   Plan of Care Reviewed With patient   Plan of Care Review   Progress no change   OTHER   Outcome Summary 79 yr. old admitted from ER with right knee pain. States unable to stand or apply weight to RT. Leg. DR. Wylie will see in AM   .   Goal: Individualization and Mutuality  Outcome: Ongoing (interventions implemented as appropriate)    Goal: Discharge Needs Assessment  Outcome: Ongoing (interventions implemented as appropriate)      Problem: Fall Risk (Adult)  Goal: Identify Related Risk Factors and Signs and Symptoms  Outcome: Outcome(s) achieved Date Met: 09/16/19    Goal: Absence of Fall  Outcome: Ongoing (interventions implemented as appropriate)      Problem: Pain, Chronic (Adult)  Goal: Identify Related Risk Factors and Signs and Symptoms  Outcome: Outcome(s) achieved Date Met: 09/16/19    Goal: Acceptable Pain/Comfort Level and Functional Ability  Outcome: Ongoing (interventions implemented as appropriate)

## 2019-09-17 ENCOUNTER — APPOINTMENT (OUTPATIENT)
Dept: ULTRASOUND IMAGING | Facility: HOSPITAL | Age: 79
End: 2019-09-17

## 2019-09-17 ENCOUNTER — APPOINTMENT (OUTPATIENT)
Dept: MRI IMAGING | Facility: HOSPITAL | Age: 79
End: 2019-09-17

## 2019-09-17 ENCOUNTER — APPOINTMENT (OUTPATIENT)
Dept: MAMMOGRAPHY | Facility: HOSPITAL | Age: 79
End: 2019-09-17

## 2019-09-17 LAB
GLUCOSE BLDC GLUCOMTR-MCNC: 110 MG/DL (ref 70–130)
GLUCOSE BLDC GLUCOMTR-MCNC: 148 MG/DL (ref 70–130)
GLUCOSE BLDC GLUCOMTR-MCNC: 253 MG/DL (ref 70–130)
GLUCOSE BLDC GLUCOMTR-MCNC: 262 MG/DL (ref 70–130)

## 2019-09-17 PROCEDURE — 25010000002 ENOXAPARIN PER 10 MG: Performed by: HOSPITALIST

## 2019-09-17 PROCEDURE — 63710000001 DIPHENHYDRAMINE PER 50 MG: Performed by: HOSPITALIST

## 2019-09-17 PROCEDURE — 99224 PR SBSQ OBSERVATION CARE/DAY 15 MINUTES: CPT | Performed by: HOSPITALIST

## 2019-09-17 PROCEDURE — 73721 MRI JNT OF LWR EXTRE W/O DYE: CPT

## 2019-09-17 PROCEDURE — 96372 THER/PROPH/DIAG INJ SC/IM: CPT

## 2019-09-17 PROCEDURE — G0378 HOSPITAL OBSERVATION PER HR: HCPCS

## 2019-09-17 PROCEDURE — 63710000001 INSULIN DETEMIR PER 5 UNITS: Performed by: HOSPITALIST

## 2019-09-17 PROCEDURE — 82962 GLUCOSE BLOOD TEST: CPT

## 2019-09-17 RX ADMIN — HYDROCODONE BITARTRATE AND ACETAMINOPHEN 1 TABLET: 5; 325 TABLET ORAL at 06:16

## 2019-09-17 RX ADMIN — DESVENLAFAXINE 50 MG: 50 TABLET, EXTENDED RELEASE ORAL at 09:36

## 2019-09-17 RX ADMIN — DIPHENHYDRAMINE HYDROCHLORIDE 25 MG: 25 CAPSULE ORAL at 20:13

## 2019-09-17 RX ADMIN — HYDROCODONE BITARTRATE AND ACETAMINOPHEN 1 TABLET: 5; 325 TABLET ORAL at 11:38

## 2019-09-17 RX ADMIN — MIDODRINE HYDROCHLORIDE 2.5 MG: 5 TABLET ORAL at 20:13

## 2019-09-17 RX ADMIN — PANTOPRAZOLE SODIUM 40 MG: 40 TABLET, DELAYED RELEASE ORAL at 06:18

## 2019-09-17 RX ADMIN — SODIUM CHLORIDE, PRESERVATIVE FREE 10 ML: 5 INJECTION INTRAVENOUS at 09:40

## 2019-09-17 RX ADMIN — Medication 1 TABLET: at 09:36

## 2019-09-17 RX ADMIN — POTASSIUM CHLORIDE 10 MEQ: 10 TABLET, EXTENDED RELEASE ORAL at 09:39

## 2019-09-17 RX ADMIN — SODIUM CHLORIDE, PRESERVATIVE FREE 10 ML: 5 INJECTION INTRAVENOUS at 20:12

## 2019-09-17 RX ADMIN — INSULIN DETEMIR 30 UNITS: 100 INJECTION, SOLUTION SUBCUTANEOUS at 21:06

## 2019-09-17 RX ADMIN — DIPHENHYDRAMINE HYDROCHLORIDE 25 MG: 25 CAPSULE ORAL at 06:16

## 2019-09-17 RX ADMIN — FUROSEMIDE 20 MG: 20 TABLET ORAL at 09:36

## 2019-09-17 RX ADMIN — GABAPENTIN 400 MG: 400 CAPSULE ORAL at 06:16

## 2019-09-17 RX ADMIN — NYSTATIN: 100000 POWDER TOPICAL at 21:17

## 2019-09-17 RX ADMIN — DIPHENHYDRAMINE HYDROCHLORIDE 25 MG: 25 CAPSULE ORAL at 14:18

## 2019-09-17 RX ADMIN — MIDODRINE HYDROCHLORIDE 2.5 MG: 5 TABLET ORAL at 17:28

## 2019-09-17 RX ADMIN — GABAPENTIN 400 MG: 400 CAPSULE ORAL at 21:06

## 2019-09-17 RX ADMIN — GABAPENTIN 400 MG: 400 CAPSULE ORAL at 14:23

## 2019-09-17 RX ADMIN — MIDODRINE HYDROCHLORIDE 2.5 MG: 5 TABLET ORAL at 09:36

## 2019-09-17 RX ADMIN — NYSTATIN: 100000 POWDER TOPICAL at 06:17

## 2019-09-17 RX ADMIN — ANTACID TABLETS 2 TABLET: 500 TABLET, CHEWABLE ORAL at 09:35

## 2019-09-17 RX ADMIN — ENOXAPARIN SODIUM 40 MG: 40 INJECTION SUBCUTANEOUS at 17:27

## 2019-09-17 RX ADMIN — FLUTICASONE PROPIONATE 2 SPRAY: 50 SPRAY, METERED NASAL at 09:37

## 2019-09-17 RX ADMIN — LEVOTHYROXINE SODIUM 88 MCG: 0.09 TABLET ORAL at 06:16

## 2019-09-17 RX ADMIN — ATORVASTATIN CALCIUM 10 MG: 10 TABLET, FILM COATED ORAL at 20:13

## 2019-09-17 NOTE — PLAN OF CARE
Problem: Patient Care Overview  Goal: Plan of Care Review  Outcome: Ongoing (interventions implemented as appropriate)   09/17/19 5502   Coping/Psychosocial   Plan of Care Reviewed With patient   Plan of Care Review   Progress no change   OTHER   Outcome Summary VSS: Waiting MRI results for further Tx from Ortho

## 2019-09-17 NOTE — NURSING NOTE
Discharge Planning Assessment  VICKY Sharma     Patient Name: Joya Singh  MRN: [id number]  Today's Date: 9/17/2019    Admit Date: 9/16/2019    Discharge Needs Assessment     Row Name 09/17/19 1013       Living Environment    Lives With  child(isabel), adult    Name(s) of Who Lives With Patient  Isaac Singh and his wife     Current Living Arrangements  home/apartment/condo    Primary Care Provided by  self    Provides Primary Care For  no one    Family Caregiver if Needed  child(isabel), adult    Family Caregiver Names  Sons Isaac and Junior and Alicia wife     Quality of Family Relationships  supportive    Able to Return to Prior Arrangements  yes       Resource/Environmental Concerns    Resource/Environmental Concerns  home accessibility    Home Accessibility Concerns  stairs to enter home    Transportation Concerns  car, none       Transition Planning    Patient/Family Anticipates Transition to  home with family;home with help/services    Patient/Family Anticipated Services at Transition  durable medical equipment;home health care    Transportation Anticipated  family or friend will provide       Discharge Needs Assessment    Concerns to be Addressed  discharge planning    Equipment Currently Used at Home  bipap/cpap;cane, quad;commode;crutches;walker, rolling;oxygen;shower chair;wheelchair    Anticipated Changes Related to Illness  none    Equipment Needed After Discharge  ramp    Offered/Gave Vendor List  other (see comments)    Patient's Choice of Community Agency(s)  Pt has used [facility] before and wants to use them again.          Discharge Plan     Row Name 09/17/19 1027       Plan    Plan  Unknown    Plan Comments  Met with pt and her son Isaac.  Permission to speak with Isaac obtained.  Pt lives in home with Isaac and his wife.  Pt has 4 stairs to enter home which is a concern.  Pt currently uses at home a walker, wheelchair, cane, oxygen at night, a cpap, shower chair, and BSC.  Pt is typically  indipendent with ADL;s  and has no problems obtaining or paying for medications.  Pt and family are concerned with pt's ability to use the stairs to enter the house.  Pt still awaiting ortho consult.  Will wait for results of consult/tests.             Demographic Summary     Row Name 09/17/19 1012       General Information    Admission Type  observation    Arrived From  home    Referral Source  admission list    Reason for Consult  discharge planning    Preferred Language  English     Used During This Interaction  no        Functional Status     Row Name 09/17/19 1013       Functional Status    Usual Activity Tolerance  moderate    Current Activity Tolerance  poor       Functional Status, IADL    Medications  independent    Meal Preparation  independent    Housekeeping  independent    Laundry  independent    Shopping  independent       Mental Status    General Appearance WDL  WDL       Mental Status Summary    Recent Changes in Mental Status/Cognitive Functioning  no changes            Domenic Stratton RN

## 2019-09-17 NOTE — SIGNIFICANT NOTE
09/17/19 1454   Rehab Treatment   Discipline occupational therapist   Reason Treatment Not Performed (awaiting ortho consult prior to OT evaluation)

## 2019-09-17 NOTE — PROGRESS NOTES
"Hospitalist Team      Patient Care Team:  Chari Mercado MD as PCP - General  Chari Mercado MD as PCP - Family Medicine  Christos Rob MD as Surgeon (General Surgery)  German Wylie MD as Surgeon (Orthopedic Surgery)  Yasmani Baugh MD as Consulting Physician (Nephrology)  Yasmani Baugh MD as Referring Physician (Nephrology)  Elieser Headley MD as Consulting Physician (Hematology and Oncology)        Chief Complaint:  Severe right knee pain    Subjective    Interval History and ROS:     Patient States Same  Patient Complaints: right knee pain.  Denies improvement.  Still cannot bear weight.  Patient Denies:  Nausea and vomiting  History taken from: patient chart family RN      Objective    Vital Signs  Temp:  [97.6 °F (36.4 °C)-99.2 °F (37.3 °C)] 97.8 °F (36.6 °C)  Heart Rate:  [75-84] 75  Resp:  [16-18] 18  BP: (120-149)/(62-80) 133/67  Oxygen Therapy  SpO2: 90 %  Pulse Oximetry Type: Intermittent  Device (Oxygen Therapy): room air}  Flowsheet Rows      First Filed Value   Admission Height  165 cm (64.96\") Documented at 09/16/2019 1326   Admission Weight  92.1 kg (203 lb) Documented at 09/16/2019 1326            Physical Exam:    Physical Exam   Constitutional: She is oriented to person, place, and time.   HENT:   Head: Atraumatic.   Eyes: EOM are normal.   Neck: Neck supple.   Cardiovascular: Normal rate and regular rhythm.   Pulmonary/Chest: Effort normal and breath sounds normal.   Abdominal: Soft. Bowel sounds are normal.   Musculoskeletal:   Severe right knee pain and will not weight bear. Getting MRI.   Neurological: She is alert and oriented to person, place, and time.   Skin: Skin is warm and dry.   Psychiatric: She has a normal mood and affect. Her behavior is normal. Judgment and thought content normal.   Nursing note and vitals reviewed.      Results Review:     I reviewed the patient's new clinical results.    Lab Results (last 24 hours)     Procedure " Component Value Units Date/Time    POC Glucose Once [[id number]]  (Abnormal) Collected:  09/17/19 1155    Specimen:  Blood Updated:  09/17/19 1207     Glucose 148 mg/dL     POC Glucose Once [[id number]]  (Normal) Collected:  09/17/19 0719    Specimen:  Blood Updated:  09/17/19 0735     Glucose 110 mg/dL     POC Glucose Once [[id number]]  (Abnormal) Collected:  09/16/19 2025    Specimen:  Blood Updated:  09/16/19 2031     Glucose 278 mg/dL     POC Glucose Once [[id number]]  (Abnormal) Collected:  09/16/19 1733    Specimen:  Blood Updated:  09/16/19 1740     Glucose 150 mg/dL           Imaging Results (last 24 hours)     ** No results found for the last 24 hours. **          Xray not reviewed personally by physician.      ECG not reviewed personally by physician  ECG/EMG Results (most recent)     None          Medication Review:   I have reviewed the patient's current medication list    Current Facility-Administered Medications:   •  acetaminophen (TYLENOL) tablet 650 mg, 650 mg, Oral, Q6H PRN, Renetta Lua, DO  •  albuterol (PROVENTIL) nebulizer solution 0.083% 2.5 mg/3mL, 2.5 mg, Nebulization, Q4H PRN, Renetta Lua, DO  •  atorvastatin (LIPITOR) tablet 10 mg, 10 mg, Oral, Nightly, Renetta Lua, DO, 10 mg at 09/16/19 2217  •  benzonatate (TESSALON) capsule 200 mg, 200 mg, Oral, TID PRN, Renetta Lua, DO  •  calcium carbonate (TUMS) chewable tablet 500 mg (200 mg elemental), 2 tablet, Oral, Daily, Renetta Lua, DO, 2 tablet at 09/17/19 0935  •  desvenlafaxine (PRISTIQ) 24 hr tablet 50 mg, 50 mg, Oral, Daily, Renetta Lua, , 50 mg at 09/17/19 0936  •  diphenhydrAMINE (BENADRYL) capsule 25 mg, 25 mg, Oral, Q6H PRN, Renetta Lua, , 25 mg at 09/17/19 1418  •  enoxaparin (LOVENOX) syringe 40 mg, 40 mg, Subcutaneous, Q24H, Renetta Lua DO, 40 mg at 09/16/19 1837  •  fluticasone (FLONASE) 50 MCG/ACT nasal spray 2 spray, 2 spray, Nasal, Daily, Renetta Lua DO, 2 spray at  09/17/19 0937  •  furosemide (LASIX) tablet 20 mg, 20 mg, Oral, Daily, Renetta Lua DO, 20 mg at 09/17/19 0936  •  gabapentin (NEURONTIN) capsule 400 mg, 400 mg, Oral, Q8H, Renetta Lua, , 400 mg at 09/17/19 1423  •  HYDROcodone-acetaminophen (NORCO) 5-325 MG per tablet 1 tablet, 1 tablet, Oral, Q6H PRN, Renetta Lua DO, 1 tablet at 09/17/19 1138  •  insulin aspart (novoLOG) injection 15 Units, 15 Units, Subcutaneous, Daily With Breakfast, Renetta Lua DO  •  insulin detemir (LEVEMIR) injection 30 Units, 30 Units, Subcutaneous, Nightly, Renetta Lua DO, 30 Units at 09/16/19 2216  •  levothyroxine (SYNTHROID, LEVOTHROID) tablet 88 mcg, 88 mcg, Oral, QAM, Renetta Lua, , 88 mcg at 09/17/19 0616  •  midodrine (PROAMATINE) tablet 2.5 mg, 2.5 mg, Oral, TID, Renetta Lua DO, 2.5 mg at 09/17/19 0936  •  multivitamin with minerals 1 tablet, 1 tablet, Oral, Daily, Renetta Lua DO, 1 tablet at 09/17/19 0936  •  nystatin (MYCOSTATIN) powder, , Topical, Q8H, Renetta Lua,   •  pantoprazole (PROTONIX) EC tablet 40 mg, 40 mg, Oral, QAM, Renetta Lua, , 40 mg at 09/17/19 0618  •  potassium chloride (K-DUR,KLOR-CON) CR tablet 10 mEq, 10 mEq, Oral, Daily, Renetta Lua DO, 10 mEq at 09/17/19 0939  •  promethazine (PHENERGAN) tablet 6.25 mg, 6.25 mg, Oral, Q6H PRN, Renetta Lua,   •  sodium chloride 0.9 % flush 10 mL, 10 mL, Intravenous, PRN, Renetta Lua,   •  sodium chloride 0.9 % flush 10 mL, 10 mL, Intravenous, Q12H, Renetta Lua, , 10 mL at 09/17/19 0940  •  sodium chloride 0.9 % infusion 40 mL, 40 mL, Intravenous, PRN, Renetta Lua,       Assessment/Plan        Right knee pain causing immobilization secondary to the pain.              Observation and consult ortho for recommendations to get patient home to outpatient care.     DM2  Depression and anxiety  Osteoarthritis  Chronic kidney disease stage III  Diabetic  gastroparesis  GERD  History of GI bleeding  HLD  Essential hypertension  Hypothyroidism  Obstructive sleep apnea/noncompliant with CPAP  Postoperative nausea and vomiting  TIA July 2017  History of recurrent urinary tract infections  Orthostatic hypotension  History of DVT left leg 8 years ago       Plan for disposition:MRI scan ordered by Dr. Winter.  Will await the results.  Keep patient in until tomorrow and see results and recommendations.    Renetta Lua DO  09/17/19  3:21 PM      Time: Level 1 progress note

## 2019-09-17 NOTE — SIGNIFICANT NOTE
09/17/19 1320   Rehab Time/Intention   Evaluation Not Performed (Will hold PT evaluation at this time, awaiting ortho consult )   Rehab Treatment   Discipline physical therapist

## 2019-09-18 ENCOUNTER — TELEPHONE (OUTPATIENT)
Dept: INTERNAL MEDICINE | Facility: CLINIC | Age: 79
End: 2019-09-18

## 2019-09-18 LAB
25(OH)D3 SERPL-MCNC: 24.9 NG/ML (ref 30–100)
ALBUMIN SERPL-MCNC: 3.6 G/DL (ref 3.5–5.2)
ALBUMIN/GLOB SERPL: 1.5 G/DL
ALP SERPL-CCNC: 93 U/L (ref 39–117)
ALT SERPL W P-5'-P-CCNC: 5 U/L (ref 1–33)
ANION GAP SERPL CALCULATED.3IONS-SCNC: 11 MMOL/L (ref 5–15)
ANISOCYTOSIS BLD QL: NORMAL
AST SERPL-CCNC: 7 U/L (ref 1–32)
BACTERIA UR QL AUTO: ABNORMAL /HPF
BASOPHILS # BLD AUTO: 0.07 10*3/MM3 (ref 0–0.2)
BASOPHILS NFR BLD AUTO: 1.6 % (ref 0–1.5)
BILIRUB SERPL-MCNC: 0.8 MG/DL (ref 0.2–1.2)
BILIRUB UR QL STRIP: NEGATIVE
BUN BLD-MCNC: 20 MG/DL (ref 8–23)
BUN/CREAT SERPL: 11.2 (ref 7–25)
CALCIUM SPEC-SCNC: 8.9 MG/DL (ref 8.6–10.5)
CHLORIDE SERPL-SCNC: 101 MMOL/L (ref 98–107)
CLARITY UR: ABNORMAL
CO2 SERPL-SCNC: 29 MMOL/L (ref 22–29)
COLOR UR: ABNORMAL
CREAT BLD-MCNC: 1.78 MG/DL (ref 0.57–1)
DEPRECATED RDW RBC AUTO: 82.3 FL (ref 37–54)
EOSINOPHIL # BLD AUTO: 0.26 10*3/MM3 (ref 0–0.4)
EOSINOPHIL NFR BLD AUTO: 6 % (ref 0.3–6.2)
ERYTHROCYTE [DISTWIDTH] IN BLOOD BY AUTOMATED COUNT: 20.5 % (ref 12.3–15.4)
FOLATE SERPL-MCNC: 15.3 NG/ML (ref 4.78–24.2)
GFR SERPL CREATININE-BSD FRML MDRD: 28 ML/MIN/1.73
GLOBULIN UR ELPH-MCNC: 2.4 GM/DL
GLUCOSE BLD-MCNC: 195 MG/DL (ref 65–99)
GLUCOSE BLDC GLUCOMTR-MCNC: 146 MG/DL (ref 70–130)
GLUCOSE BLDC GLUCOMTR-MCNC: 166 MG/DL (ref 70–130)
GLUCOSE BLDC GLUCOMTR-MCNC: 202 MG/DL (ref 70–130)
GLUCOSE BLDC GLUCOMTR-MCNC: 316 MG/DL (ref 70–130)
GLUCOSE UR STRIP-MCNC: NEGATIVE MG/DL
HBA1C MFR BLD: 8 % (ref 4.8–5.6)
HCT VFR BLD AUTO: 26.2 % (ref 34–46.6)
HGB BLD-MCNC: 8.2 G/DL (ref 12–15.9)
HGB UR QL STRIP.AUTO: ABNORMAL
HYALINE CASTS UR QL AUTO: ABNORMAL /LPF
IMM GRANULOCYTES # BLD AUTO: 0.31 10*3/MM3 (ref 0–0.05)
IMM GRANULOCYTES NFR BLD AUTO: 7.1 % (ref 0–0.5)
KETONES UR QL STRIP: NEGATIVE
LARGE PLATELETS: NORMAL
LEUKOCYTE ESTERASE UR QL STRIP.AUTO: ABNORMAL
LYMPHOCYTES # BLD AUTO: 1.61 10*3/MM3 (ref 0.7–3.1)
LYMPHOCYTES NFR BLD AUTO: 37.1 % (ref 19.6–45.3)
MACROCYTES BLD QL SMEAR: NORMAL
MCH RBC QN AUTO: 34.6 PG (ref 26.6–33)
MCHC RBC AUTO-ENTMCNC: 31.3 G/DL (ref 31.5–35.7)
MCV RBC AUTO: 110.5 FL (ref 79–97)
MONOCYTES # BLD AUTO: 0.19 10*3/MM3 (ref 0.1–0.9)
MONOCYTES NFR BLD AUTO: 4.4 % (ref 5–12)
NEUTROPHILS # BLD AUTO: 1.9 10*3/MM3 (ref 1.7–7)
NEUTROPHILS NFR BLD AUTO: 43.8 % (ref 42.7–76)
NITRITE UR QL STRIP: NEGATIVE
PH UR STRIP.AUTO: 7 [PH] (ref 4.5–8)
PLATELET # BLD AUTO: 325 10*3/MM3 (ref 140–450)
PMV BLD AUTO: 11.3 FL (ref 6–12)
POTASSIUM BLD-SCNC: 4.1 MMOL/L (ref 3.5–5.2)
PROT SERPL-MCNC: 6 G/DL (ref 6–8.5)
PROT UR QL STRIP: ABNORMAL
RBC # BLD AUTO: 2.37 10*6/MM3 (ref 3.77–5.28)
RBC # UR: ABNORMAL /HPF
REF LAB TEST METHOD: ABNORMAL
SODIUM BLD-SCNC: 141 MMOL/L (ref 136–145)
SP GR UR STRIP: 1.02 (ref 1–1.03)
SQUAMOUS #/AREA URNS HPF: ABNORMAL /HPF
TSH SERPL DL<=0.05 MIU/L-ACNC: 6.34 UIU/ML (ref 0.27–4.2)
UROBILINOGEN UR QL STRIP: ABNORMAL
VIT B12 BLD-MCNC: 436 PG/ML (ref 211–946)
WBC MORPH BLD: NORMAL
WBC NRBC COR # BLD: 4.34 10*3/MM3 (ref 3.4–10.8)
WBC UR QL AUTO: ABNORMAL /HPF

## 2019-09-18 PROCEDURE — 25010000002 ENOXAPARIN PER 10 MG: Performed by: HOSPITALIST

## 2019-09-18 PROCEDURE — 87186 SC STD MICRODIL/AGAR DIL: CPT | Performed by: NURSE PRACTITIONER

## 2019-09-18 PROCEDURE — 63710000001 INSULIN ASPART PER 5 UNITS: Performed by: HOSPITALIST

## 2019-09-18 PROCEDURE — 82306 VITAMIN D 25 HYDROXY: CPT | Performed by: NURSE PRACTITIONER

## 2019-09-18 PROCEDURE — 99226 PR SBSQ OBSERVATION CARE/DAY 35 MINUTES: CPT | Performed by: NURSE PRACTITIONER

## 2019-09-18 PROCEDURE — 85025 COMPLETE CBC W/AUTO DIFF WBC: CPT | Performed by: NURSE PRACTITIONER

## 2019-09-18 PROCEDURE — 81001 URINALYSIS AUTO W/SCOPE: CPT | Performed by: NURSE PRACTITIONER

## 2019-09-18 PROCEDURE — 99214 OFFICE O/P EST MOD 30 MIN: CPT | Performed by: ORTHOPAEDIC SURGERY

## 2019-09-18 PROCEDURE — G0378 HOSPITAL OBSERVATION PER HR: HCPCS

## 2019-09-18 PROCEDURE — 85007 BL SMEAR W/DIFF WBC COUNT: CPT | Performed by: NURSE PRACTITIONER

## 2019-09-18 PROCEDURE — 96372 THER/PROPH/DIAG INJ SC/IM: CPT

## 2019-09-18 PROCEDURE — 82746 ASSAY OF FOLIC ACID SERUM: CPT | Performed by: NURSE PRACTITIONER

## 2019-09-18 PROCEDURE — 93010 ELECTROCARDIOGRAM REPORT: CPT | Performed by: INTERNAL MEDICINE

## 2019-09-18 PROCEDURE — 82607 VITAMIN B-12: CPT | Performed by: NURSE PRACTITIONER

## 2019-09-18 PROCEDURE — 94799 UNLISTED PULMONARY SVC/PX: CPT

## 2019-09-18 PROCEDURE — 93005 ELECTROCARDIOGRAM TRACING: CPT | Performed by: NURSE PRACTITIONER

## 2019-09-18 PROCEDURE — 63710000001 DIPHENHYDRAMINE PER 50 MG: Performed by: NURSE PRACTITIONER

## 2019-09-18 PROCEDURE — 87086 URINE CULTURE/COLONY COUNT: CPT | Performed by: NURSE PRACTITIONER

## 2019-09-18 PROCEDURE — 63710000001 INSULIN ASPART PER 5 UNITS: Performed by: NURSE PRACTITIONER

## 2019-09-18 PROCEDURE — 80053 COMPREHEN METABOLIC PANEL: CPT | Performed by: NURSE PRACTITIONER

## 2019-09-18 PROCEDURE — P9612 CATHETERIZE FOR URINE SPEC: HCPCS

## 2019-09-18 PROCEDURE — 63710000001 DIPHENHYDRAMINE PER 50 MG: Performed by: HOSPITALIST

## 2019-09-18 PROCEDURE — 63710000001 INSULIN DETEMIR PER 5 UNITS: Performed by: HOSPITALIST

## 2019-09-18 PROCEDURE — 83036 HEMOGLOBIN GLYCOSYLATED A1C: CPT | Performed by: NURSE PRACTITIONER

## 2019-09-18 PROCEDURE — 84443 ASSAY THYROID STIM HORMONE: CPT | Performed by: NURSE PRACTITIONER

## 2019-09-18 PROCEDURE — 82962 GLUCOSE BLOOD TEST: CPT

## 2019-09-18 PROCEDURE — 87077 CULTURE AEROBIC IDENTIFY: CPT | Performed by: NURSE PRACTITIONER

## 2019-09-18 PROCEDURE — 20610 DRAIN/INJ JOINT/BURSA W/O US: CPT | Performed by: ORTHOPAEDIC SURGERY

## 2019-09-18 RX ORDER — HYDROCODONE BITARTRATE AND ACETAMINOPHEN 7.5; 325 MG/1; MG/1
1 TABLET ORAL EVERY 4 HOURS PRN
Status: DISCONTINUED | OUTPATIENT
Start: 2019-09-18 | End: 2019-09-20 | Stop reason: HOSPADM

## 2019-09-18 RX ORDER — DIPHENHYDRAMINE HCL 25 MG
25 CAPSULE ORAL 3 TIMES DAILY
Status: DISCONTINUED | OUTPATIENT
Start: 2019-09-18 | End: 2019-09-20 | Stop reason: HOSPADM

## 2019-09-18 RX ORDER — DIPHENHYDRAMINE HCL 50 MG
50 CAPSULE ORAL NIGHTLY
Status: DISCONTINUED | OUTPATIENT
Start: 2019-09-18 | End: 2019-09-20 | Stop reason: HOSPADM

## 2019-09-18 RX ORDER — NICOTINE POLACRILEX 4 MG
15 LOZENGE BUCCAL
Status: DISCONTINUED | OUTPATIENT
Start: 2019-09-18 | End: 2019-09-20 | Stop reason: HOSPADM

## 2019-09-18 RX ORDER — LEVOTHYROXINE SODIUM 112 UG/1
112 TABLET ORAL EVERY MORNING
Status: DISCONTINUED | OUTPATIENT
Start: 2019-09-19 | End: 2019-09-20

## 2019-09-18 RX ORDER — DEXTROSE MONOHYDRATE 25 G/50ML
25 INJECTION, SOLUTION INTRAVENOUS
Status: DISCONTINUED | OUTPATIENT
Start: 2019-09-18 | End: 2019-09-20 | Stop reason: HOSPADM

## 2019-09-18 RX ORDER — CYCLOBENZAPRINE HCL 10 MG
10 TABLET ORAL 3 TIMES DAILY
Status: DISCONTINUED | OUTPATIENT
Start: 2019-09-18 | End: 2019-09-20 | Stop reason: HOSPADM

## 2019-09-18 RX ORDER — CHOLECALCIFEROL (VITAMIN D3) 125 MCG
5 CAPSULE ORAL NIGHTLY PRN
Status: DISCONTINUED | OUTPATIENT
Start: 2019-09-18 | End: 2019-09-20 | Stop reason: HOSPADM

## 2019-09-18 RX ADMIN — CYCLOBENZAPRINE HYDROCHLORIDE 10 MG: 10 TABLET, FILM COATED ORAL at 20:58

## 2019-09-18 RX ADMIN — HYDROCODONE BITARTRATE AND ACETAMINOPHEN 1 TABLET: 5; 325 TABLET ORAL at 08:57

## 2019-09-18 RX ADMIN — MIDODRINE HYDROCHLORIDE 2.5 MG: 5 TABLET ORAL at 10:08

## 2019-09-18 RX ADMIN — POTASSIUM CHLORIDE 10 MEQ: 10 TABLET, EXTENDED RELEASE ORAL at 10:10

## 2019-09-18 RX ADMIN — SODIUM CHLORIDE, PRESERVATIVE FREE 10 ML: 5 INJECTION INTRAVENOUS at 20:55

## 2019-09-18 RX ADMIN — PANTOPRAZOLE SODIUM 40 MG: 40 TABLET, DELAYED RELEASE ORAL at 06:15

## 2019-09-18 RX ADMIN — INSULIN ASPART 15 UNITS: 100 INJECTION, SOLUTION INTRAVENOUS; SUBCUTANEOUS at 08:56

## 2019-09-18 RX ADMIN — MIDODRINE HYDROCHLORIDE 2.5 MG: 5 TABLET ORAL at 17:27

## 2019-09-18 RX ADMIN — NYSTATIN 1 APPLICATION: 100000 POWDER TOPICAL at 21:03

## 2019-09-18 RX ADMIN — DESVENLAFAXINE 50 MG: 50 TABLET, EXTENDED RELEASE ORAL at 10:07

## 2019-09-18 RX ADMIN — CYCLOBENZAPRINE HYDROCHLORIDE 10 MG: 10 TABLET, FILM COATED ORAL at 16:54

## 2019-09-18 RX ADMIN — SODIUM CHLORIDE, PRESERVATIVE FREE 10 ML: 5 INJECTION INTRAVENOUS at 10:10

## 2019-09-18 RX ADMIN — NYSTATIN: 100000 POWDER TOPICAL at 14:49

## 2019-09-18 RX ADMIN — INSULIN ASPART 5 UNITS: 100 INJECTION, SOLUTION INTRAVENOUS; SUBCUTANEOUS at 20:59

## 2019-09-18 RX ADMIN — DIPHENHYDRAMINE HYDROCHLORIDE 25 MG: 25 CAPSULE ORAL at 16:54

## 2019-09-18 RX ADMIN — INSULIN DETEMIR 30 UNITS: 100 INJECTION, SOLUTION SUBCUTANEOUS at 20:59

## 2019-09-18 RX ADMIN — ATORVASTATIN CALCIUM 10 MG: 10 TABLET, FILM COATED ORAL at 20:57

## 2019-09-18 RX ADMIN — HYDROCODONE BITARTRATE AND ACETAMINOPHEN 1 TABLET: 5; 325 TABLET ORAL at 01:20

## 2019-09-18 RX ADMIN — ENOXAPARIN SODIUM 40 MG: 40 INJECTION SUBCUTANEOUS at 17:28

## 2019-09-18 RX ADMIN — DIPHENHYDRAMINE HYDROCHLORIDE 25 MG: 25 CAPSULE ORAL at 10:21

## 2019-09-18 RX ADMIN — FUROSEMIDE 20 MG: 20 TABLET ORAL at 10:08

## 2019-09-18 RX ADMIN — INSULIN ASPART 3 UNITS: 100 INJECTION, SOLUTION INTRAVENOUS; SUBCUTANEOUS at 13:02

## 2019-09-18 RX ADMIN — GABAPENTIN 400 MG: 400 CAPSULE ORAL at 05:34

## 2019-09-18 RX ADMIN — LEVOTHYROXINE SODIUM 88 MCG: 0.09 TABLET ORAL at 06:15

## 2019-09-18 RX ADMIN — NYSTATIN: 100000 POWDER TOPICAL at 05:33

## 2019-09-18 RX ADMIN — ANTACID TABLETS 2 TABLET: 500 TABLET, CHEWABLE ORAL at 10:06

## 2019-09-18 RX ADMIN — HYDROCODONE BITARTRATE AND ACETAMINOPHEN 1 TABLET: 7.5; 325 TABLET ORAL at 13:08

## 2019-09-18 RX ADMIN — DIPHENHYDRAMINE HYDROCHLORIDE 50 MG: 25 CAPSULE ORAL at 20:58

## 2019-09-18 RX ADMIN — MIDODRINE HYDROCHLORIDE 2.5 MG: 5 TABLET ORAL at 20:58

## 2019-09-18 RX ADMIN — HYDROCODONE BITARTRATE AND ACETAMINOPHEN 1 TABLET: 7.5; 325 TABLET ORAL at 16:59

## 2019-09-18 RX ADMIN — CYCLOBENZAPRINE HYDROCHLORIDE 10 MG: 10 TABLET, FILM COATED ORAL at 13:02

## 2019-09-18 RX ADMIN — Medication 1 TABLET: at 10:09

## 2019-09-18 RX ADMIN — GABAPENTIN 400 MG: 400 CAPSULE ORAL at 21:00

## 2019-09-18 RX ADMIN — GABAPENTIN 400 MG: 400 CAPSULE ORAL at 13:09

## 2019-09-18 RX ADMIN — FLUTICASONE PROPIONATE 2 SPRAY: 50 SPRAY, METERED NASAL at 10:07

## 2019-09-18 NOTE — CONSULTS
Orthopedic Consult      Patient: Joya Singh    Date of Admission: 9/16/2019  1:23 PM    YOB: 1940    Medical Record Number: [id number]    Attending Physician: Renetta Lua DO  Consulting Physician:       Chief Complaints: Knee pain [M25.569]  Right knee pain, unspecified chronicity [M25.561]      History of Present Illness: 79 y.o. female admitted to [facility] to services of Renetta Lua DO with Knee pain [M25.569]  Right knee pain, unspecified chronicity [M25.561]. I  was consulted for further evaluation and treatment.  On evaluation patient states that she was walking and twisted her knee and felt a pop primarily the medial but also to the lateral aspect of her right knee with associated swelling and inability to bear weight on her right lower extremity.  This occurred approximately 2 days ago, since that time she is rating her pain is a 7 and 9 out of 10, aching in nature with occasional sharp pain particular exacerbated with weightbearing activities as well as deep flexion and rotational activities of her leg.  Denies associated numbness or tingling, mild radiation of pain down the lateral aspect of her right tibia but not below the level of her ankle.  Denies any fevers chills or sweats and denies any significant weight loss or gain in the last 6 months.  Denies any prior significant injury, issues, or treatment to her right knee.    Allergies   Allergen Reactions   • Baclofen Anxiety     Panic attack, nightmares   • Eliquis [Apixaban] Anaphylaxis   • Codeine Itching and Rash   • Lisinopril Cough   • Morphine Hives   • Penicillins Rash     Tolerates cephalosporins        Medications:   Home Medications:  Medications Prior to Admission   Medication Sig Dispense Refill Last Dose   • ACCU-CHEK FASTCLIX LANCETS misc TEST 3-4 TIMES DAILY AS DIRECTED 400 each 3 9/15/2019 at Unknown time   • ACCU-CHEK SMARTVIEW test strip TEST BLOOD SUGAR THREE TIMES DAILY OR AS DIRECTED 300 each 3  9/15/2019 at Unknown time   • acetaminophen (TYLENOL) 325 MG tablet Take 2 tablets by mouth Every 6 (Six) Hours As Needed for Mild Pain . OTC product 40 tablet 0 9/15/2019 at Unknown time   • albuterol sulfate  (90 Base) MCG/ACT inhaler Inhale 2 puffs Every 4 (Four) Hours As Needed for Wheezing. 1 inhaler 3 Past Week at Unknown time   • Alcohol Swabs (B-D SINGLE USE SWABS REGULAR) pads    9/15/2019 at Unknown time   • atorvastatin (LIPITOR) 10 MG tablet TAKE ONE TABLET BY MOUTH AT BEDTIME 90 tablet 1 9/15/2019 at Unknown time   • benzonatate (TESSALON) 200 MG capsule TAKE ONE CAPSULE BY MOUTH THREE TIMES DAILY AS NEEDED FOR COUGH 20 capsule 0 9/15/2019 at Unknown time   • Calcium Carbonate-Vit D-Min (CALCIUM 1200) 0979-5387 MG-UNIT chewable tablet Chew 1 tablet Daily.   9/15/2019 at Unknown time   • cyclobenzaprine (FLEXERIL) 10 MG tablet TAKE ONE TABLET BY MOUTH TWICE DAILY as needed for muscle spasms 30 tablet 0 9/15/2019 at Unknown time   • diphenhydrAMINE (BENADRYL) 25 mg capsule Take 25 mg by mouth Every 6 (Six) Hours As Needed for Itching.   9/15/2019 at Unknown time   • fluticasone (FLONASE) 50 MCG/ACT nasal spray 2 sprays into the nostril(s) as directed by provider Daily. 16 g 0 9/15/2019 at Unknown time   • furosemide (LASIX) 20 MG tablet TAKE ONE (1) TABLET ORALLY (BY MOUTH) ONCE DAILY 90 tablet 1 9/15/2019 at Unknown time   • gabapentin (NEURONTIN) 400 MG capsule TAKE ONE CAPSULE BY MOUTH EVERY MORNING, ONE AT NOON, AND TWO AT BEDTIME 120 capsule 5 9/15/2019 at Unknown time   • HYDROcodone-acetaminophen (NORCO) 5-325 MG per tablet Take one po bid    Prn  Moderate pain 60 tablet 0 9/15/2019 at Unknown time   • insulin aspart (novoLOG) 100 UNIT/ML injection Inject 15 Units under the skin into the appropriate area as directed Daily With Breakfast. 15 units with breakfast 10 units with lunch 10 units with dinner   9/15/2019 at Unknown time   • Insulin Degludec (TRESIBA FLEXTOUCH) 200 UNIT/ML solution  "pen-injector Inject 54 Units under the skin into the appropriate area as directed every night at bedtime. 9 mL 11 9/15/2019 at Unknown time   • levothyroxine (SYNTHROID, LEVOTHROID) 88 MCG tablet TAKE ONE TABLET BY MOUTH EVERY DAY 90 tablet 1 9/15/2019 at Unknown time   • midodrine (PROAMATINE) 2.5 MG tablet Take 1 tablet by mouth 3 (Three) Times a Day. 90 tablet 6 9/15/2019 at Unknown time   • Multiple Vitamins-Minerals (CENTRUM SILVER PO) Take  by mouth Daily.   9/15/2019 at Unknown time   • Needle, Disp, (BD DISP NEEDLES) 30G X 1/2\" misc To be used 3 times daily with Novolog Flexpen. 100 each 5 9/16/2019 at Unknown time   • NYSTATIN [id number] UNIT/GM powder APPLY TOPICALLY 3 TIMES A DAY 60 g 2 9/15/2019 at Unknown time   • O2 (OXYGEN) Inhale 2 L/min Every Night. Uses 2L  overnight only   9/15/2019 at Unknown time   • omeprazole (priLOSEC) 40 MG capsule TAKE ONE CAPSULE BY MOUTH EVERY DAY 90 capsule 1 9/15/2019 at Unknown time   • potassium chloride (K-DUR) 10 MEQ CR tablet TAKE ONE (1) TABLET ORALLY (BY MOUTH) ONCE DAILY 90 tablet 2 9/15/2019 at Unknown time   • promethazine (PHENERGAN) 12.5 MG tablet TABLET ONE-HALF TABLET TO ONE TABLET BY MOUTH EVERY 6 HOURS AS NEEDED FOR NAUSEA 20 tablet 0 Past Month at Unknown time   • ULTICARE MINI PEN NEEDLES 31G X 6 MM misc USE TO INJECT INSULINS 4 TIMES DAILY AS DIRECTED 400 each 3 9/16/2019 at Unknown time   • Blood Glucose Monitoring Suppl (ACCU-CHEK KENNETH SMARTVIEW) w/Device kit TEST blood sugar three times daily or as directed 1 kit 0 Taking   • HYDROcodone-acetaminophen (NORCO) 5-325 MG per tablet TAKE ONE TABLET every 12 hours BY MOUTH FOR PAIN 60 tablet 0 Taking       Current Medications:  Scheduled Meds:  atorvastatin 10 mg Oral Nightly   calcium carbonate 2 tablet Oral Daily   cyclobenzaprine 10 mg Oral TID   desvenlafaxine 50 mg Oral Daily   diphenhydrAMINE 25 mg Oral TID   diphenhydrAMINE 50 mg Oral Nightly   enoxaparin 40 mg Subcutaneous Q24H   fluticasone 2 spray " Nasal Daily   furosemide 20 mg Oral Daily   gabapentin 400 mg Oral Q8H   insulin aspart 0-7 Units Subcutaneous 4x Daily AC & at Bedtime   insulin aspart 15 Units Subcutaneous Daily With Breakfast   insulin detemir 30 Units Subcutaneous Nightly   [START ON 9/19/2019] levothyroxine 112 mcg Oral QAM   midodrine 2.5 mg Oral TID   multivitamin with minerals 1 tablet Oral Daily   nystatin  Topical Q8H   pantoprazole 40 mg Oral QAM   potassium chloride 10 mEq Oral Daily   sodium chloride 10 mL Intravenous Q12H     Continuous Infusions:   PRN Meds:.•  acetaminophen  •  albuterol  •  benzonatate  •  dextrose  •  dextrose  •  diphenhydrAMINE  •  glucagon (human recombinant)  •  HYDROcodone-acetaminophen  •  melatonin  •  promethazine  •  sodium chloride  •  sodium chloride       Past Medical History:   Diagnosis Date   • Allergic rhinitis    • Anemia    • Anxiety    • Appetite absent    • Arthritis    • Asthma    • Back pain    • Bell's palsy    • Black tarry stools    • Blood in stool    • Chronic fatigue    • CKD (chronic kidney disease) stage 3, GFR 30-59 ml/min (CMS/[facility])    • Community acquired pneumonia of left lung 10/25/2018   • Cough    • Depression    • Diabetes mellitus (CMS/[facility])     LAST A1C 6   • Diabetic gastroparesis (CMS/[facility]) 2/19/2016   • Difficulty walking    • Excessive urination at night    • Frequent urination    • GERD (gastroesophageal reflux disease)    • GI bleed    • Gout    • H/O blood clots     LEFT LEG 7 OR 8 YEARS AGO   • Heat intolerance    • History of fall 10/2018   • History of prior pregnancies     x8, miscarriage 5   • History of transfusion 11/2018    due to anemia   • Hyperlipidemia    • Hypertension    • Hypothyroidism    • Normal coronary arteries     by cath 2013   • Orthostatic hypotension    • AARON (obstructive sleep apnea)     DOESNT WEAR REGULARLY   • PONV (postoperative nausea and vomiting)    • Skin cancer    • Stroke (CMS/[facility])     Several mini-strokes   • TIA (transient ischemic  attack)     LAST TIA JULY 2017   • Urination pain    • UTI (urinary tract infection)     Dec 2018 and Jan 2019        Past Surgical History:   Procedure Laterality Date   • BACK SURGERY      HARDWARE   • CHOLECYSTECTOMY      OPEN   • COLONOSCOPY  2011    due for repeat in 2021   • COLONOSCOPY N/A 10/28/2018    Procedure: COLONOSCOPY;  Surgeon: Emmanuel Rogers MD;  Location: [facility] OR;  Service: Gastroenterology   • ENDOSCOPY N/A 10/26/2018    Procedure: ESOPHAGOGASTRODUODENOSCOPY;  Surgeon: Emmanuel Rogers MD;  Location: [facility] OR;  Service: Gastroenterology   • HYSTERECTOMY      PARTIAL    • KNEE SURGERY     • NECK SURGERY     • SPINE SURGERY     • TUMOR REMOVAL Left     Leg   • UPPER GASTROINTESTINAL ENDOSCOPY  2014    gastritis.  done by dr. hastings        Social History     Occupational History     Employer: RETIRED   Tobacco Use   • Smoking status: Never Smoker   • Smokeless tobacco: Never Used   • Tobacco comment: CAFFEINE USE: NONE   Substance and Sexual Activity   • Alcohol use: No   • Drug use: No   • Sexual activity: Defer     Comment: EXERCISE - RARELY    Social History     Social History Narrative   • Not on file        Family History   Problem Relation Age of Onset   • Lupus Mother    • Heart failure Mother 59   • Heart disease Other    • Hypertension Other    • Heart attack Father    • Breast cancer Neg Hx          Review of Systems:   HEENT: Patient denies any headaches, vision changes, change in hearing, or tinnitus, Patient denies any rhinorrhea,epistaxis, sinus pain, mouth or dental problems, sore throat or hoarseness, or dysphagia  Pulmonary: Patient denies any cough, congestion, SOA, or wheezing  Cardiovascular: Patient denies any chest pain, dyspnea, palpitations, weakness, intolerance of exercise, varicosities, swelling of extremities, known murmur  Gastrointestinal:  Patient denies nausea, vomiting, diarrhea, constipation, loss  of appetite, change in appetite,  dysphagia, gas, heartburn, melena, change in bowel habits, use of laxatives or other drugs to alter the function of the gastrointestinal tract.  Genital/Urinary: Patient denies dysuria, change in color of urine, change in frequency of urination, pain with urgency, incontinence, retention, or nocturia.  Musculoskeletal: Patient denies increased warmth; redness; or swelling of joints; limitation of function; deformity; crepitation: notes pain in right knee as documented above in HPI.  Denies pain in low back or neck.    Neurological: Patient denies dizziness, tremor, ataxia, difficulty in speaking, change in speech, paresthesia, loss of sensation, seizures, syncope, changes in memory.  Endocrine system: Patient denies tremors, palpitations, intolerance of heat or cold, polyuria, polydipsia, polyphagia, diaphoresis, exophthalmos, or goiter.  Psychological: Patient denies thoughts/plans or harming self or other; depression,  insomnia, night terrors, chandler, memory loss, disorientation.  Skin: Patient denies any bruising, rashes, discoloration, pruritus, wounds, ulcers, decubiti, changes in the hair or nails  Hematopoietic: Patient denies history of spontaneous or excessive bleeding, epistaxis, hematuria, melena, fatigue, enlarged or tender lymph nodes, pallor, history of anemia.    Physical Exam: 79 y.o. female  Vitals:    09/18/19 1123 09/18/19 1128 09/18/19 1319 09/18/19 1538   BP: 124/62 118/68  127/57   BP Location: Right arm Right arm     Patient Position: Lying Sitting     Pulse: 77 91  77   Resp: 18 20 18   Temp: 98.2 °F (36.8 °C)   98.3 °F (36.8 °C)   TempSrc: Oral   Oral   SpO2:  90%  90%   Weight:   92.1 kg (203 lb 0.7 oz)    Height:         General Appearance:    Alert, cooperative, in no acute distress                      Head:    Normocephalic, without obvious abnormality, atraumatic   Eyes:            Lids and lashes normal, conjunctivae and sclerae normal, no   icterus, no pallor, corneas clear, PERRLA    Ears:    Ears appear intact with no abnormalities noted   Throat:   No oral lesions, no thrush, oral mucosa moist   Neck:   No adenopathy, supple, trachea midline, no thyromegaly, no   carotid bruit, no JVD   Back:     No kyphosis present, no scoliosis present, no skin lesions,      erythema or scars, no tenderness to percussion or                   palpation,   range of motion normal   Lungs:     Clear to auscultation,respirations regular, even and                  unlabored    Heart:    Regular rhythm and normal rate, normal S1 and S2, no            murmur, no gallop, no rub, no click   Chest Wall:    No abnormalities observed   Abdomen:     Normal bowel sounds, no masses, no organomegaly, soft        non-tender, non-distended, no guarding, no rebound                tenderness   Rectal:     Deferred   Extremities:  Right knee-range of motion 0 to 95 degrees, 4 out of 5 strength on flexion and extension, tenderness noted at medial and lateral joint line, moderate joint effusion, positive El, Moves all remaining extremities well, no edema, no cyanosis, no redness   Pulses:   Pulses palpable and equal bilaterally   Skin:   No bleeding, bruising or rash   Lymph nodes:   No palpable adenopathy   Neurologic:   Cranial nerves 2 - 12 grossly intact, sensation intact, DTR       present and equal bilaterally           Diagnostic Tests:  Admission on 09/16/2019   Component Date Value Ref Range Status   • Glucose 09/16/2019 150* 70 - 130 mg/dL Final   • Glucose 09/16/2019 278* 70 - 130 mg/dL Final   • Glucose 09/17/2019 110  70 - 130 mg/dL Final   • Glucose 09/17/2019 148* 70 - 130 mg/dL Final   • Glucose 09/17/2019 253* 70 - 130 mg/dL Final   • Glucose 09/17/2019 262* 70 - 130 mg/dL Final   • Glucose 09/18/2019 166* 70 - 130 mg/dL Final   • Glucose 09/18/2019 195* 65 - 99 mg/dL Final   • BUN 09/18/2019 20  8 - 23 mg/dL Final   • Creatinine 09/18/2019 1.78* 0.57 - 1.00 mg/dL Final   • Sodium 09/18/2019 141  136 - 145  mmol/L Final   • Potassium 09/18/2019 4.1  3.5 - 5.2 mmol/L Final   • Chloride 09/18/2019 101  98 - 107 mmol/L Final   • CO2 09/18/2019 29.0  22.0 - 29.0 mmol/L Final   • Calcium 09/18/2019 8.9  8.6 - 10.5 mg/dL Final   • Total Protein 09/18/2019 6.0  6.0 - 8.5 g/dL Final   • Albumin 09/18/2019 3.60  3.50 - 5.20 g/dL Final   • ALT (SGPT) 09/18/2019 5  1 - 33 U/L Final   • AST (SGOT) 09/18/2019 7  1 - 32 U/L Final   • Alkaline Phosphatase 09/18/2019 93  39 - 117 U/L Final   • Total Bilirubin 09/18/2019 0.8  0.2 - 1.2 mg/dL Final   • eGFR Non African Amer 09/18/2019 28* >60 mL/min/1.73 Final   • Globulin 09/18/2019 2.4  gm/dL Final   • A/G Ratio 09/18/2019 1.5  g/dL Final   • BUN/Creatinine Ratio 09/18/2019 11.2  7.0 - 25.0 Final   • Anion Gap 09/18/2019 11.0  5.0 - 15.0 mmol/L Final   • WBC 09/18/2019 4.34  3.40 - 10.80 10*3/mm3 Final   • RBC 09/18/2019 2.37* 3.77 - 5.28 10*6/mm3 Final   • Hemoglobin 09/18/2019 8.2* 12.0 - 15.9 g/dL Final   • Hematocrit 09/18/2019 26.2* 34.0 - 46.6 % Final   • MCV 09/18/2019 110.5* 79.0 - 97.0 fL Final   • MCH 09/18/2019 34.6* 26.6 - 33.0 pg Final   • MCHC 09/18/2019 31.3* 31.5 - 35.7 g/dL Final   • RDW 09/18/2019 20.5* 12.3 - 15.4 % Final   • RDW-SD 09/18/2019 82.3* 37.0 - 54.0 fl Final   • MPV 09/18/2019 11.3  6.0 - 12.0 fL Final   • Platelets 09/18/2019 325  140 - 450 10*3/mm3 Final   • Neutrophil % 09/18/2019 43.8  42.7 - 76.0 % Final   • Lymphocyte % 09/18/2019 37.1  19.6 - 45.3 % Final   • Monocyte % 09/18/2019 4.4* 5.0 - 12.0 % Final   • Eosinophil % 09/18/2019 6.0  0.3 - 6.2 % Final   • Basophil % 09/18/2019 1.6* 0.0 - 1.5 % Final   • Immature Grans % 09/18/2019 7.1* 0.0 - 0.5 % Final   • Neutrophils, Absolute 09/18/2019 1.90  1.70 - 7.00 10*3/mm3 Final   • Lymphocytes, Absolute 09/18/2019 1.61  0.70 - 3.10 10*3/mm3 Final   • Monocytes, Absolute 09/18/2019 0.19  0.10 - 0.90 10*3/mm3 Final   • Eosinophils, Absolute 09/18/2019 0.26  0.00 - 0.40 10*3/mm3 Final   • Basophils,  Absolute 09/18/2019 0.07  0.00 - 0.20 10*3/mm3 Final   • Immature Grans, Absolute 09/18/2019 0.31* 0.00 - 0.05 10*3/mm3 Final   • Color, UA 09/18/2019 Other* Yellow, Straw Final   • Appearance, UA 09/18/2019 Slightly Cloudy* Clear Final   • pH, UA 09/18/2019 7.0  4.5 - 8.0 Final   • Specific Gravity, UA 09/18/2019 1.020  1.003 - 1.030 Final   • Glucose, UA 09/18/2019 Negative  Negative Final   • Ketones, UA 09/18/2019 Negative  Negative Final   • Bilirubin, UA 09/18/2019 Negative  Negative Final   • Blood, UA 09/18/2019 Trace* Negative Final   • Protein, UA 09/18/2019 30 mg/dL (1+)* Negative Final   • Leuk Esterase, UA 09/18/2019 Moderate (2+)* Negative Final   • Nitrite, UA 09/18/2019 Negative  Negative Final   • Urobilinogen, UA 09/18/2019 0.2 E.U./dL  0.2 - 1.0 E.U./dL Final   • Folate 09/18/2019 15.30  4.78 - 24.20 ng/mL Final   • Vitamin B-12 09/18/2019 436  211 - 946 pg/mL Final   • Hemoglobin A1C 09/18/2019 8.00* 4.80 - 5.60 % Final   • TSH 09/18/2019 6.340* 0.270 - 4.200 uIU/mL Final   • 25 Hydroxy, Vitamin D 09/18/2019 24.9* 30.0 - 100.0 ng/ml Final   • Anisocytosis 09/18/2019 Slight/1+  None Seen Final   • Macrocytes 09/18/2019 Slight/1+  None Seen Final   • WBC Morphology 09/18/2019 Normal  Normal Final   • Large Platelets 09/18/2019 Slight/1+  None Seen Final   • RBC, UA 09/18/2019 3-5* None Seen /HPF Final   • WBC, UA 09/18/2019 31-50* None Seen /HPF Final   • Bacteria, UA 09/18/2019 3+* None Seen /HPF Final   • Squamous Epithelial Cells, UA 09/18/2019 3-6* None Seen, 0-2 /HPF Final   • Hyaline Casts, UA 09/18/2019 None Seen  None Seen /LPF Final   • Methodology 09/18/2019 Manual Light Microscopy   Final   • Glucose 09/18/2019 202* 70 - 130 mg/dL Final   • Glucose 09/18/2019 146* 70 - 130 mg/dL Final     Xr Knee 1 Or 2 View Right    Result Date: 9/16/2019  Impression: Negative right knee.  This report was finalized on 9/16/2019 2:17 PM by Dr. Henry Mendiola MD.      Xr Tibia Fibula 2 View Right    Result  Date: 9/16/2019  Impression: There may be some lateral soft tissue swelling of the ankle. Otherwise study is normal.  This report was finalized on 9/16/2019 2:18 PM by Dr. Henry Mendiola MD.      Mri Knee Right Without Contrast    Result Date: 9/17/2019  Impression: Complex tear posterior horn of the medial meniscus. Lateral meniscal degeneration. Mild to moderate cartilage thinning in all 3 compartments with no focal osteochondral defects. These changes are accompanied by moderately large joint effusion and generalized soft tissue swelling around the knee. Signer Name: Casey Quiles MD  Signed: 9/17/2019 7:27 PM  Workstation Name: [id number]  [facility]    Dexa Bone Density Axial    Result Date: 9/11/2019  Impression: 1. Osteopenia. 2. Left forearm (1/3) T-score measures -1.8 3. Left femoral neck T-score measures -2.1 4. Statistically significant WORSENING in the BMD of the left forearm by 7.8% and left hip by 12.1% when compared to the baseline study performed 09/27/2011.  FRAX? tool* Calculation of 10-year Fracture Risk  Major Osteoporotic Fracture 13% Hip Fracture 3.6%  *The FRAX? tool has been developed by WHO to evaluate fracture risk of patients. It is based on individual patient models that integrate the risks associated with clinical risk factors as well as bone mineral density (BMD) at the femoral neck.  The FRAX? algorithms give the 10-year probability of fracture. The output is a 10-year probability of hip fracture and the 10-year probability of a major osteoporotic fracture (clinical spine, forearm, hip or shoulder fracture).  Patients with a bone densitometry diagnosis of osteoporosis benefit from the use of osteoporosis medications. The FRAX? tool is helpful in determining which patients with a diagnosis of osteopenia or low bone mass would benefit from the use of osteoporosis medications. Patients with FRAX score 3% or higher at the hip or 20% or higher at other sites should be  considered for medical therapy.  This report was finalized on 9/11/2019 2:34 PM by Dr. Navarro Smalls MD.      Mammo Screening Digital Tomosynthesis Bilateral With Cad    Result Date: 9/11/2019  Impression: New 5 mm oval-shaped nodule in the right breast. Suggest further evaluation with targeted ultrasound.  BI-RADS CATEGORY 0: Needs additional evaluation.   Women over the age of 40 undergoing screening mammography are entered into a reminder system with target due date for the next mammogram.  This report was finalized on 9/11/2019 9:01 AM by Dr. Navarro Smalls MD.      Review of MRI right knee as well as x-rays right knee including review of images as well as radiology reports indicate complex medial lateral meniscal tears with moderate chondral degeneration in all 3 compartments appreciated, no evidence of intra-articular his body, cruciate and collateral ligaments are grossly intact at this time.    Assessment:  Patient Active Problem List   Diagnosis   • Deep vein thrombosis of lower extremity (CMS/HCC)   • Arthritis   • Chronic back pain   • Chronic anemia   • Type 2 diabetes mellitus with neurologic complication (CMS/HCC)   • Brittle diabetes mellitus (CMS/HCC)   • Hyperlipidemia   • Hypertension   • Insomnia with sleep apnea   • Obstructive sleep apnea syndrome   • Peripheral neuropathy   • Diabetic gastroparesis (CMS/HCC)   • Primary localized osteoarthrosis of left shoulder region   • Rotator cuff tendonitis   • Acquired hypothyroidism   • Anxiety   • Gastroesophageal reflux disease   • History of biliary T-tube placement   • 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)   • Complex tear of medial meniscus of left knee as current injury   • Insufficiency fracture of tibia   • Primary osteoarthritis of left knee   • Orthostatic hypotension   • Tendinitis of right rotator cuff   • AC joint arthropathy   • Subacromial impingement of right shoulder   • Right carpal tunnel syndrome   • Anemia requiring  "transfusions   • Monoclonal gammopathy of unknown significance (MGUS)   • Myelodysplastic syndrome with 5q deletion (CMS/HCC)   • History of deep venous thrombosis (DVT) of distal vein of left lower extremity   • Moderate episode of recurrent major depressive disorder (CMS/HCC)   • Chronic diastolic CHF (congestive heart failure) (CMS/HCC)   • Knee pain   Right knee chondral malacia  Right knee medial and lateral meniscal tears        Plan:  The patient voiced understanding of the risks, benefits, and alternative forms of treatment that were discussed and the patient consents to proceed with intra-articular injection of her right knee at this point time given acute arthritic flare following her injury.  Her meniscal tears appear to be most consistent with degenerative type tears.  She can utilize a knee immobilizer for assistance ambulation until she regains some strength and better control of her knee with improved motion but can wean out of that as tolerated.  I can follow-up with her in the office in 6 to 8 weeks to evaluate improvement with injection and discuss further options at that time.     Knee Injection Procedure Note    Right knee injection was discussed with the patient in detail, including indication, risks, benefits, and alternatives. Verbal consent was given for the procedure. Injection was performed by physician.  Injection site was identified by physical examination and cleaned with Betadine and alcohol swabs. Prior to needle insertion, ethyl chloride spray was used for surface anesthesia. Sterile technique was used.  A 25-gauge, 1.5\" needle was directed to the joint from a(n) lateral and superior approach. Injectate was passed into the joint space without difficulty. The needle was removed and a simple bandage was applied. The procedure was tolerated well without difficulty.    Injection mixture:  1% lidocaine without epinephrine: 5 mL  40 mg/mL triamcinolone acetonide: 2 mL    German Wylie, " MD      Dictated utilizing Dragon dictation

## 2019-09-18 NOTE — PLAN OF CARE
Problem: Patient Care Overview  Goal: Plan of Care Review  Outcome: Ongoing (interventions implemented as appropriate)   09/18/19 1414   Coping/Psychosocial   Plan of Care Reviewed With patient   Plan of Care Review   Progress improving   OTHER   Outcome Summary pain level improving after meds adjusted,purwick applied,labs and urine sample sent . MD aware of results.Appetite good (insulin coverage as needed)     Goal: Individualization and Mutuality  Outcome: Ongoing (interventions implemented as appropriate)    Goal: Discharge Needs Assessment  Outcome: Ongoing (interventions implemented as appropriate)      Problem: Fall Risk (Adult)  Goal: Absence of Fall  Outcome: Ongoing (interventions implemented as appropriate)      Problem: Pain, Chronic (Adult)  Goal: Acceptable Pain/Comfort Level and Functional Ability  Outcome: Ongoing (interventions implemented as appropriate)      Problem: Skin Injury Risk (Adult)  Goal: Identify Related Risk Factors and Signs and Symptoms  Outcome: Outcome(s) achieved Date Met: 09/18/19    Goal: Skin Health and Integrity  Outcome: Ongoing (interventions implemented as appropriate)

## 2019-09-18 NOTE — SIGNIFICANT NOTE
09/18/19 1018   Rehab Time/Intention   Evaluation Not Performed other (see comments)  (awaiting Ortho consult )   Rehab Treatment   Discipline occupational therapist

## 2019-09-18 NOTE — PLAN OF CARE
Problem: Patient Care Overview  Goal: Plan of Care Review  Outcome: Ongoing (interventions implemented as appropriate)   09/18/19 1323   Coping/Psychosocial   Plan of Care Reviewed With patient   OTHER   Outcome Summary Chart reviewed due to HX CHF list. Pt reports avoids Na+ at home & denies hx of CHF despite water pill, says is for HTN. Declines edu. Good po intake. Will cont to follow

## 2019-09-18 NOTE — PLAN OF CARE
Problem: Patient Care Overview  Goal: Plan of Care Review  Outcome: Ongoing (interventions implemented as appropriate)      Problem: Fall Risk (Adult)  Goal: Absence of Fall  Outcome: Ongoing (interventions implemented as appropriate)      Problem: Pain, Chronic (Adult)  Goal: Acceptable Pain/Comfort Level and Functional Ability  Outcome: Ongoing (interventions implemented as appropriate)      Problem: Skin Injury Risk (Adult)  Goal: Identify Related Risk Factors and Signs and Symptoms  Outcome: Ongoing (interventions implemented as appropriate)    Goal: Skin Health and Integrity  Outcome: Ongoing (interventions implemented as appropriate)

## 2019-09-18 NOTE — TELEPHONE ENCOUNTER
----- Message from Deb Corea sent at 9/17/2019  8:59 AM EDT -----  Contact: daria burnham pt    DARIA CQALLED REGARDING THE MAMMOGRAM AND ULTRA SOUND THAT THE PATIENT WAS / IS SCHEDULED FOR TODAY AT 1:30 AND 2 PM HERE IN THE HOSPITAL. She is admitted here for a knee injury and waiting for ortho doctor to come in to see her. He wanted to know if she should keep that appointment.  Per leticia, I called daria back to tell him that she did need to have those done today.  I advised daria to tell the nurse on duty about it. He called back to say that the nurse told him that they could not do that.  I am not sure if he is cancelling this or we need to? He is also concerned if they discharge her because he cannot maneuver her at home. I told him that the nurse would help to advise about that when she is discharged.  Thanks!  deb

## 2019-09-18 NOTE — PROGRESS NOTES
Adult Nutrition  Assessment/PES    Patient Name:  Joya Singh  YOB: 1940  MRN: [id number]  Admit Date:  9/16/2019    Assessment Date:  9/18/2019    Comments:  Declines education on diet.  Will cont to follow.    Reason for Assessment     Row Name 09/18/19 1317          Reason for Assessment    Reason For Assessment  identified at risk by screening criteria CHf hx list     Diagnosis  trauma knee pain, DM, CKD hx HF         Nutrition/Diet History     Row Name 09/18/19 1318          Nutrition/Diet History    Typical Food/Fluid Intake  Pt excited to have renal restriction d/c. NKFA. Denies issue chew/swallowing. Denies any wt changes. Declines edu needs, denies CHF but hays report HTN & avoids salt for that at home.          Anthropometrics     Row Name 09/18/19 1319          Anthropometrics    Weight  92.1 kg (203 lb 0.7 oz)        Body Mass Index (BMI)    BMI Assessment  BMI 30-34.9: obesity grade I         Labs/Tests/Procedures/Meds     Row Name 09/18/19 1319          Labs/Procedures/Meds    Lab Results Reviewed  reviewed     Lab Results Comments  glu 110-278 H        Diagnostic Tests/Procedures    Diagnostic Test/Procedure Reviewed  reviewed        Medications    Pertinent Medications Reviewed  reviewed     Pertinent Medications Comments  lasix, novolog, levemir, synthroid, MVT, KCL, tums         Physical Findings     Row Name 09/18/19 1319          Physical Findings    Overall Physical Appearance  obese         Estimated/Assessed Needs     Row Name 09/18/19 1319          Estimated/Assessed Needs    Additional Documentation  Calorie Requirements (Group);Protein Requirements (Group);Fluid Requirements (Group)        Calorie Requirements    Estimated Calorie Need Method  Story-[employer] [place]     Estimated Calorie Requirement Comment  5129-8802 kcal ( mifflin 0-1.2)  159-190 gm CHO, 45 %kcal         Protein Requirements    Weight Used For Protein Calculations  -- 74 gm pro      Est Protein Requirement Amount  (gms/kg)  0.8 gm protein        Fluid Requirements    Estimated Fluid Requirements (mL/day)  -- 4610-3749 ml CHF guidelines     Estimated Fluid Requirement Method  other (see comments)         Nutrition Prescription Ordered     Row Name 09/18/19 1321          Nutrition Prescription PO    Common Modifiers  Consistent Carbohydrate         Evaluation of Received Nutrient/Fluid Intake     Row Name 09/18/19 1321          Fluid Intake Evaluation    Oral Fluid (mL)  400 ave x 3, 27%         PO Evaluation    Number of Meals  4     % PO Intake  81               Problem/Interventions:  Problem 1     Row Name 09/18/19 1322          Nutrition Diagnoses Problem 1    Problem 1  Overweight/Obesity     Etiology (related to)  Functional Diagnosis;Medical Diagnosis     Signs/Symptoms (evidenced by)  BMI;Report/Observation     BMI  30 - 34.9                 Intervention Goal     Row Name 09/18/19 1322          Intervention Goal    General  Provide information regarding MNT for treatment/condition     PO  Maintain intake;PO intake (%)     PO Intake %  80 % or greater     Weight  Appropriate weight loss lasix noted         Nutrition Intervention     Row Name 09/18/19 1323          Nutrition Intervention    RD/Tech Action  Interview for preference;Follow Tx progress           Education/Evaluation     Row Name 09/18/19 1323          Education    Education  Education offered and refused        Monitor/Evaluation    Monitor  Per protocol;I&O;PO intake;Pertinent labs;Weight;Symptoms           Electronically signed by:  Cristiane Bush RD  09/18/19 1:24 PM

## 2019-09-18 NOTE — SIGNIFICANT NOTE
09/18/19 1019   Rehab Time/Intention   Evaluation Not Performed (spoke with ortho REY, continuing to await ortho MD recommendations.  )   Rehab Treatment   Discipline physical therapist

## 2019-09-18 NOTE — TELEPHONE ENCOUNTER
----- Message from Heydi Jackson sent at 9/18/2019  9:15 AM EDT -----  Contact: patient  PATIENT HAD HER MRI YESTERDAY AND IS AWAITING FOR RESULTS BEFORE INPATIENT DOCTORS DECIDE WHAT TO DO.

## 2019-09-18 NOTE — TELEPHONE ENCOUNTER
Per Dr. Mercado - this was not ordered by her, she needs to follow up with the ordering ORTHO doctor. Sorry, Thank you.

## 2019-09-18 NOTE — PROGRESS NOTES
"SERVICE: [facility] HOSPITALIST    CONSULTANTS: Orthopedic surgery    CHIEF COMPLAINT: Follow-up right knee pain    SUBJECTIVE: The patient notes that she is having 10 out of 10 pain and it is not controlled with current medication regimen.  She notes being unable to bear any weight on the right knee.  She reports chronic itching and normally takes scheduled Benadryl throughout the day.  She also requests diet restrictions be lifted if possible.  She otherwise denies f/c/cough/soa/chest pain/n/v/d/abdominal pain or other new concerns.    OBJECTIVE:    /71 (BP Location: Right arm, Patient Position: Lying)   Pulse 90   Temp 98.1 °F (36.7 °C) (Oral)   Resp 18   Ht 165 cm (64.96\")   Wt 92.1 kg (203 lb)   SpO2 90%   BMI 33.82 kg/m²     MEDS/LABS REVIEWED AND ORDERED    atorvastatin 10 mg Oral Nightly   calcium carbonate 2 tablet Oral Daily   cyclobenzaprine 10 mg Oral TID   desvenlafaxine 50 mg Oral Daily   diphenhydrAMINE 25 mg Oral TID   diphenhydrAMINE 50 mg Oral Nightly   enoxaparin 40 mg Subcutaneous Q24H   fluticasone 2 spray Nasal Daily   furosemide 20 mg Oral Daily   gabapentin 400 mg Oral Q8H   insulin aspart 0-7 Units Subcutaneous 4x Daily AC & at Bedtime   insulin aspart 15 Units Subcutaneous Daily With Breakfast   insulin detemir 30 Units Subcutaneous Nightly   [START ON 9/19/2019] levothyroxine 112 mcg Oral QAM   midodrine 2.5 mg Oral TID   multivitamin with minerals 1 tablet Oral Daily   nystatin  Topical Q8H   pantoprazole 40 mg Oral QAM   potassium chloride 10 mEq Oral Daily   sodium chloride 10 mL Intravenous Q12H     Physical Exam   Constitutional: She is oriented to person, place, and time. She appears well-developed and well-nourished.   Obese   HENT:   Head: Normocephalic and atraumatic.   Left facial droop   Eyes: EOM are normal. Pupils are equal, round, and reactive to light.   Cardiovascular: Normal rate and regular rhythm.   Pulmonary/Chest: Effort normal and breath " sounds normal. No stridor. No respiratory distress. She has no wheezes.   Abdominal: Soft. Bowel sounds are normal. She exhibits no distension. There is no tenderness. There is no guarding.   Musculoskeletal:   RLE in immobilizer, knee area not visualized, sensation present but diminished to both feet, reportedly chronic    Neurological: She is alert and oriented to person, place, and time.   Skin: Skin is warm and dry. No erythema.   Psychiatric: She has a normal mood and affect. Her behavior is normal.   Vitals reviewed.    LAB/DIAGNOSTICS:    Lab Results (last 24 hours)     Procedure Component Value Units Date/Time    Urinalysis With Culture If Indicated - Urine, Catheter In/Out [[id number]]  (Abnormal) Collected:  09/18/19 1022    Specimen:  Urine, Catheter In/Out Updated:  09/18/19 1049     Color, UA Other     Appearance, UA Slightly Cloudy     pH, UA 7.0     Specific Gravity, UA 1.020     Glucose, UA Negative     Ketones, UA Negative     Bilirubin, UA Negative     Blood, UA Trace     Protein, UA 30 mg/dL (1+)     Leuk Esterase, UA Moderate (2+)     Nitrite, UA Negative     Urobilinogen, UA 0.2 E.U./dL    Urinalysis, Microscopic Only - Urine, Catheter In/Out [[id number]] Collected:  09/18/19 1022    Specimen:  Urine, Catheter In/Out Updated:  09/18/19 1045    TSH [[id number]]  (Abnormal) Collected:  09/18/19 0943    Specimen:  Blood Updated:  09/18/19 1014     TSH 6.340 uIU/mL     Comprehensive Metabolic Panel [[id number]]  (Abnormal) Collected:  09/18/19 0943    Specimen:  Blood Updated:  09/18/19 1007     Glucose 195 mg/dL      BUN 20 mg/dL      Creatinine 1.78 mg/dL      Sodium 141 mmol/L      Potassium 4.1 mmol/L      Chloride 101 mmol/L      CO2 29.0 mmol/L      Calcium 8.9 mg/dL      Total Protein 6.0 g/dL      Albumin 3.60 g/dL      ALT (SGPT) 5 U/L      AST (SGOT) 7 U/L      Alkaline Phosphatase 93 U/L      Total Bilirubin 0.8 mg/dL      eGFR Non African Amer 28 mL/min/1.73      Globulin 2.4 gm/dL      A/G  Ratio 1.5 g/dL      BUN/Creatinine Ratio 11.2     Anion Gap 11.0 mmol/L     Narrative:       GFR Normal >60  Chronic Kidney Disease <60  Kidney Failure <15    CBC & Differential [[id number]] Collected:  09/18/19 0943    Specimen:  Blood Updated:  09/18/19 0956    Narrative:       The following orders were created for panel order CBC & Differential.  Procedure                               Abnormality         Status                     ---------                               -----------         ------                     CBC Auto Differential[[id number]]        Abnormal            Final result                 Please view results for these tests on the individual orders.    CBC Auto Differential [[id number]]  (Abnormal) Collected:  09/18/19 0943    Specimen:  Blood Updated:  09/18/19 0956     WBC 4.34 10*3/mm3      RBC 2.37 10*6/mm3      Hemoglobin 8.2 g/dL      Hematocrit 26.2 %      .5 fL      MCH 34.6 pg      MCHC 31.3 g/dL      RDW 20.5 %      RDW-SD 82.3 fl      MPV 11.3 fL      Platelets 325 10*3/mm3      Neutrophil % 43.8 %      Lymphocyte % 37.1 %      Monocyte % 4.4 %      Eosinophil % 6.0 %      Basophil % 1.6 %      Immature Grans % 7.1 %      Neutrophils, Absolute 1.90 10*3/mm3      Lymphocytes, Absolute 1.61 10*3/mm3      Monocytes, Absolute 0.19 10*3/mm3      Eosinophils, Absolute 0.26 10*3/mm3      Basophils, Absolute 0.07 10*3/mm3      Immature Grans, Absolute 0.31 10*3/mm3     Scan Slide [[id number]] Collected:  09/18/19 0943    Specimen:  Blood Updated:  09/18/19 0949    Hemoglobin A1c [[id number]] Collected:  09/18/19 0943    Specimen:  Blood Updated:  09/18/19 0943    Folate [[id number]] Collected:  09/18/19 0943    Specimen:  Blood Updated:  09/18/19 0943    Vitamin B12 [[id number]] Collected:  09/18/19 0943    Specimen:  Blood Updated:  09/18/19 0943    Vitamin D 25 Hydroxy [[id number]] Collected:  09/18/19 0943    Specimen:  Blood Updated:  09/18/19 0943    POC Glucose Once [[id number]]   (Abnormal) Collected:  09/18/19 0716    Specimen:  Blood Updated:  09/18/19 0741     Glucose 166 mg/dL     POC Glucose Once [[id number]]  (Abnormal) Collected:  09/17/19 1948    Specimen:  Blood Updated:  09/17/19 1954     Glucose 262 mg/dL     POC Glucose Once [[id number]]  (Abnormal) Collected:  09/17/19 1642    Specimen:  Blood Updated:  09/17/19 1700     Glucose 253 mg/dL     POC Glucose Once [[id number]]  (Abnormal) Collected:  09/17/19 1155    Specimen:  Blood Updated:  09/17/19 1207     Glucose 148 mg/dL         ECG 12 Lead    (Results Pending)     Results for orders placed during the hospital encounter of 10/04/18   Adult Transthoracic Echo Complete W/ Cont if Necessary Per Protocol    Narrative · Left ventricular systolic function is normal.  · Calculated right ventricular systolic pressure from tricuspid   regurgitation is 16 mmHg.  · Left ventricular diastolic dysfunction (grade I) consistent with   impaired relaxation.  · Calculated EF = 67%.        Xr Knee 1 Or 2 View Right    Result Date: 9/16/2019  Negative right knee.  This report was finalized on 9/16/2019 2:17 PM by Dr. Henry Mendiola MD.      Xr Tibia Fibula 2 View Right    Result Date: 9/16/2019  There may be some lateral soft tissue swelling of the ankle. Otherwise study is normal.  This report was finalized on 9/16/2019 2:18 PM by Dr. Henry Mendiola MD.      Mri Knee Right Without Contrast    Result Date: 9/17/2019  Complex tear posterior horn of the medial meniscus. Lateral meniscal degeneration. Mild to moderate cartilage thinning in all 3 compartments with no focal osteochondral defects. These changes are accompanied by moderately large joint effusion and generalized soft tissue swelling around the knee. Signer Name: Casey Quiles MD  Signed: 9/17/2019 7:27 PM  Workstation Name: [id number]  [facility]    ASSESSMENT/PLAN:  Acute right medial meniscus tear: Orthopedic surgery following  Unable to bear weight due to pain, adjust  "pain medication up to Norco 7.5 mg every 4 hours as needed, add home Flexeril scheduled 3 times daily  Continue gabapentin per home regimen  No surgery planned, injection likely today per Dr. 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  Check A1c  Add Accu-Cheks AC/at bedtime  Add low-dose sliding scale insulin  Continue home Levemir 30 units nightly, NovoLog 15 units daily with breakfast  Monitor    Chronic macrocytic anemia:  Check B12/folate, no active blood loss noted  Hemoglobin 8.2, past baseline 10-11  Due for Procrit shot tomorrow, plan injection if discharged    Chronic pruritus: Add home Benadryl 25 mg 3 times daily and 50 mg nightly per home regimen    Hypertension with chronic orthostatic hypotension:  Add orthostatic vital signs, continue midodrine 2.5 mg 3 times daily, Lasix 20 mg daily    CKD stage III/IV:  Baseline creatinine approximately 1.8 to 1.9  Currently 1.78, no acute issues  Monitor     Hypothyroidism: TSH elevated, increase levothyroxine to 112 mcg daily  Follow-up PCP for recheck TFTs in 6 to 8 weeks    Chronic nocturnal hypoxia/AARON, noncompliance with CPAP: No current acute issues, uses 2 liters oxygen with sleep, ordered for tonight    GERD:  Diabetic gastroparesis: No acute issues currently  Followed by Dr. Clark outpatient, continues tonics 40 mg daily    Hyperlipidemia: No acute issues on Lipitor 10 mg nightly    Anxiety/depression: No acute issues on Pristiq 50 mg daily    H/O TIA 7/2017:   H/O Bell's palsy: No current acute issues    H/O frequent UTIs with chronic urinary frequency/incontinence: Check UA C&S  Female catheter    H/O DVT LLE 8 years ago: No current acute issues on Lovenox for DVT prophylaxis    PLAN FOR DISPOSITION: Home when able, hopefully tomorrow    REY Samuel  Hospitalist, [facility]  09/18/19  9:06 AM    \"Dictated utilizing Dragon dictation\"    "

## 2019-09-19 ENCOUNTER — APPOINTMENT (OUTPATIENT)
Dept: ONCOLOGY | Facility: HOSPITAL | Age: 79
End: 2019-09-19

## 2019-09-19 ENCOUNTER — APPOINTMENT (OUTPATIENT)
Dept: LAB | Facility: HOSPITAL | Age: 79
End: 2019-09-19

## 2019-09-19 LAB
ANION GAP SERPL CALCULATED.3IONS-SCNC: 8.5 MMOL/L (ref 5–15)
ANISOCYTOSIS BLD QL: NORMAL
BASOPHILS # BLD AUTO: 0.05 10*3/MM3 (ref 0–0.2)
BASOPHILS NFR BLD AUTO: 0.8 % (ref 0–1.5)
BUN BLD-MCNC: 24 MG/DL (ref 8–23)
BUN/CREAT SERPL: 13.1 (ref 7–25)
CALCIUM SPEC-SCNC: 9.4 MG/DL (ref 8.6–10.5)
CHLORIDE SERPL-SCNC: 98 MMOL/L (ref 98–107)
CO2 SERPL-SCNC: 30.5 MMOL/L (ref 22–29)
CREAT BLD-MCNC: 1.83 MG/DL (ref 0.57–1)
DEPRECATED RDW RBC AUTO: 77.7 FL (ref 37–54)
EOSINOPHIL # BLD AUTO: 0.18 10*3/MM3 (ref 0–0.4)
EOSINOPHIL NFR BLD AUTO: 2.8 % (ref 0.3–6.2)
ERYTHROCYTE [DISTWIDTH] IN BLOOD BY AUTOMATED COUNT: 20.4 % (ref 12.3–15.4)
GFR SERPL CREATININE-BSD FRML MDRD: 27 ML/MIN/1.73
GLUCOSE BLD-MCNC: 279 MG/DL (ref 65–99)
GLUCOSE BLDC GLUCOMTR-MCNC: 275 MG/DL (ref 70–130)
GLUCOSE BLDC GLUCOMTR-MCNC: 321 MG/DL (ref 70–130)
GLUCOSE BLDC GLUCOMTR-MCNC: 335 MG/DL (ref 70–130)
GLUCOSE BLDC GLUCOMTR-MCNC: 372 MG/DL (ref 70–130)
HCT VFR BLD AUTO: 26.8 % (ref 34–46.6)
HGB BLD-MCNC: 8.7 G/DL (ref 12–15.9)
IMM GRANULOCYTES # BLD AUTO: 0.11 10*3/MM3 (ref 0–0.05)
IMM GRANULOCYTES NFR BLD AUTO: 1.7 % (ref 0–0.5)
LYMPHOCYTES # BLD AUTO: 1.16 10*3/MM3 (ref 0.7–3.1)
LYMPHOCYTES NFR BLD AUTO: 18.2 % (ref 19.6–45.3)
MACROCYTES BLD QL SMEAR: NORMAL
MCH RBC QN AUTO: 34.7 PG (ref 26.6–33)
MCHC RBC AUTO-ENTMCNC: 32.5 G/DL (ref 31.5–35.7)
MCV RBC AUTO: 106.8 FL (ref 79–97)
MONOCYTES # BLD AUTO: 0.3 10*3/MM3 (ref 0.1–0.9)
MONOCYTES NFR BLD AUTO: 4.7 % (ref 5–12)
NEUTROPHILS # BLD AUTO: 4.59 10*3/MM3 (ref 1.7–7)
NEUTROPHILS NFR BLD AUTO: 71.8 % (ref 42.7–76)
NRBC BLD AUTO-RTO: 0 /100 WBC (ref 0–0.2)
PLAT MORPH BLD: NORMAL
PLATELET # BLD AUTO: 322 10*3/MM3 (ref 140–450)
PMV BLD AUTO: 11.5 FL (ref 6–12)
POTASSIUM BLD-SCNC: 5.1 MMOL/L (ref 3.5–5.2)
RBC # BLD AUTO: 2.51 10*6/MM3 (ref 3.77–5.28)
SODIUM BLD-SCNC: 137 MMOL/L (ref 136–145)
WBC MORPH BLD: NORMAL
WBC NRBC COR # BLD: 6.39 10*3/MM3 (ref 3.4–10.8)

## 2019-09-19 PROCEDURE — 85025 COMPLETE CBC W/AUTO DIFF WBC: CPT | Performed by: NURSE PRACTITIONER

## 2019-09-19 PROCEDURE — 25010000002 ENOXAPARIN PER 10 MG: Performed by: HOSPITALIST

## 2019-09-19 PROCEDURE — 99225 PR SBSQ OBSERVATION CARE/DAY 25 MINUTES: CPT | Performed by: HOSPITALIST

## 2019-09-19 PROCEDURE — 63710000001 INSULIN ASPART PER 5 UNITS: Performed by: NURSE PRACTITIONER

## 2019-09-19 PROCEDURE — 97165 OT EVAL LOW COMPLEX 30 MIN: CPT

## 2019-09-19 PROCEDURE — 85007 BL SMEAR W/DIFF WBC COUNT: CPT | Performed by: NURSE PRACTITIONER

## 2019-09-19 PROCEDURE — 94799 UNLISTED PULMONARY SVC/PX: CPT

## 2019-09-19 PROCEDURE — 96372 THER/PROPH/DIAG INJ SC/IM: CPT

## 2019-09-19 PROCEDURE — 82962 GLUCOSE BLOOD TEST: CPT

## 2019-09-19 PROCEDURE — 63710000001 INSULIN DETEMIR PER 5 UNITS: Performed by: HOSPITALIST

## 2019-09-19 PROCEDURE — G0378 HOSPITAL OBSERVATION PER HR: HCPCS

## 2019-09-19 PROCEDURE — 97161 PT EVAL LOW COMPLEX 20 MIN: CPT

## 2019-09-19 PROCEDURE — 63710000001 DIPHENHYDRAMINE PER 50 MG: Performed by: NURSE PRACTITIONER

## 2019-09-19 PROCEDURE — 63710000001 INSULIN ASPART PER 5 UNITS: Performed by: HOSPITALIST

## 2019-09-19 PROCEDURE — 80048 BASIC METABOLIC PNL TOTAL CA: CPT | Performed by: NURSE PRACTITIONER

## 2019-09-19 RX ORDER — SULFAMETHOXAZOLE AND TRIMETHOPRIM 800; 160 MG/1; MG/1
1 TABLET ORAL EVERY 12 HOURS SCHEDULED
Status: DISCONTINUED | OUTPATIENT
Start: 2019-09-19 | End: 2019-09-20

## 2019-09-19 RX ADMIN — CYCLOBENZAPRINE HYDROCHLORIDE 10 MG: 10 TABLET, FILM COATED ORAL at 20:59

## 2019-09-19 RX ADMIN — SODIUM CHLORIDE, PRESERVATIVE FREE 10 ML: 5 INJECTION INTRAVENOUS at 20:55

## 2019-09-19 RX ADMIN — MIDODRINE HYDROCHLORIDE 2.5 MG: 5 TABLET ORAL at 08:38

## 2019-09-19 RX ADMIN — ANTACID TABLETS 2 TABLET: 500 TABLET, CHEWABLE ORAL at 08:37

## 2019-09-19 RX ADMIN — DIPHENHYDRAMINE HYDROCHLORIDE 25 MG: 25 CAPSULE ORAL at 08:37

## 2019-09-19 RX ADMIN — ATORVASTATIN CALCIUM 10 MG: 10 TABLET, FILM COATED ORAL at 20:59

## 2019-09-19 RX ADMIN — INSULIN ASPART 15 UNITS: 100 INJECTION, SOLUTION INTRAVENOUS; SUBCUTANEOUS at 08:37

## 2019-09-19 RX ADMIN — INSULIN ASPART 6 UNITS: 100 INJECTION, SOLUTION INTRAVENOUS; SUBCUTANEOUS at 20:54

## 2019-09-19 RX ADMIN — DIPHENHYDRAMINE HYDROCHLORIDE 25 MG: 25 CAPSULE ORAL at 16:59

## 2019-09-19 RX ADMIN — INSULIN DETEMIR 30 UNITS: 100 INJECTION, SOLUTION SUBCUTANEOUS at 20:54

## 2019-09-19 RX ADMIN — MIDODRINE HYDROCHLORIDE 2.5 MG: 5 TABLET ORAL at 17:00

## 2019-09-19 RX ADMIN — NYSTATIN 1 APPLICATION: 100000 POWDER TOPICAL at 06:25

## 2019-09-19 RX ADMIN — CYCLOBENZAPRINE HYDROCHLORIDE 10 MG: 10 TABLET, FILM COATED ORAL at 16:59

## 2019-09-19 RX ADMIN — INSULIN ASPART 4 UNITS: 100 INJECTION, SOLUTION INTRAVENOUS; SUBCUTANEOUS at 08:39

## 2019-09-19 RX ADMIN — NYSTATIN: 100000 POWDER TOPICAL at 13:56

## 2019-09-19 RX ADMIN — FLUTICASONE PROPIONATE 2 SPRAY: 50 SPRAY, METERED NASAL at 08:38

## 2019-09-19 RX ADMIN — NYSTATIN: 100000 POWDER TOPICAL at 21:02

## 2019-09-19 RX ADMIN — FUROSEMIDE 20 MG: 20 TABLET ORAL at 08:38

## 2019-09-19 RX ADMIN — SODIUM CHLORIDE, PRESERVATIVE FREE 10 ML: 5 INJECTION INTRAVENOUS at 08:39

## 2019-09-19 RX ADMIN — SULFAMETHOXAZOLE AND TRIMETHOPRIM 160 MG: 800; 160 TABLET ORAL at 18:28

## 2019-09-19 RX ADMIN — GABAPENTIN 400 MG: 400 CAPSULE ORAL at 21:00

## 2019-09-19 RX ADMIN — PANTOPRAZOLE SODIUM 40 MG: 40 TABLET, DELAYED RELEASE ORAL at 06:25

## 2019-09-19 RX ADMIN — HYDROCODONE BITARTRATE AND ACETAMINOPHEN 1 TABLET: 7.5; 325 TABLET ORAL at 11:30

## 2019-09-19 RX ADMIN — Medication 1 TABLET: at 08:38

## 2019-09-19 RX ADMIN — CYCLOBENZAPRINE HYDROCHLORIDE 10 MG: 10 TABLET, FILM COATED ORAL at 08:37

## 2019-09-19 RX ADMIN — DIPHENHYDRAMINE HYDROCHLORIDE 50 MG: 25 CAPSULE ORAL at 20:59

## 2019-09-19 RX ADMIN — MIDODRINE HYDROCHLORIDE 2.5 MG: 5 TABLET ORAL at 20:59

## 2019-09-19 RX ADMIN — INSULIN ASPART 5 UNITS: 100 INJECTION, SOLUTION INTRAVENOUS; SUBCUTANEOUS at 11:30

## 2019-09-19 RX ADMIN — LEVOTHYROXINE SODIUM 112 MCG: 112 TABLET ORAL at 06:26

## 2019-09-19 RX ADMIN — GABAPENTIN 400 MG: 400 CAPSULE ORAL at 13:56

## 2019-09-19 RX ADMIN — HYDROCODONE BITARTRATE AND ACETAMINOPHEN 1 TABLET: 7.5; 325 TABLET ORAL at 16:58

## 2019-09-19 RX ADMIN — ENOXAPARIN SODIUM 40 MG: 40 INJECTION SUBCUTANEOUS at 18:28

## 2019-09-19 RX ADMIN — DESVENLAFAXINE 50 MG: 50 TABLET, EXTENDED RELEASE ORAL at 08:44

## 2019-09-19 RX ADMIN — INSULIN ASPART 5 UNITS: 100 INJECTION, SOLUTION INTRAVENOUS; SUBCUTANEOUS at 16:59

## 2019-09-19 RX ADMIN — HYDROCODONE BITARTRATE AND ACETAMINOPHEN 1 TABLET: 7.5; 325 TABLET ORAL at 06:26

## 2019-09-19 RX ADMIN — GABAPENTIN 400 MG: 400 CAPSULE ORAL at 06:25

## 2019-09-19 RX ADMIN — POTASSIUM CHLORIDE 10 MEQ: 10 TABLET, EXTENDED RELEASE ORAL at 08:40

## 2019-09-19 NOTE — PLAN OF CARE
"Problem: Patient Care Overview  Goal: Plan of Care Review   09/19/19 1431   Coping/Psychosocial   Plan of Care Reviewed With patient   OTHER   Outcome Summary OT evaluation completed. pt complains of pain 8/10 in R foot. States pain present since knee injection yesterday. pt requires CGA for functional transfers from the recliner chair and BSC with RW. pt with complaints of pain throughout mobility and states her plan is to go to rehab because \" I cant take care of myself like this\". OT will see pt 3 x week while in hospital.          "

## 2019-09-19 NOTE — PLAN OF CARE
Problem: Patient Care Overview  Goal: Plan of Care Review  Outcome: Ongoing (interventions implemented as appropriate)   09/19/19 1619   Coping/Psychosocial   Plan of Care Reviewed With patient   Plan of Care Review   Progress improving   OTHER   Outcome Summary VSS, on oral antibiotic for urine culture growth, up in chair, using bedside commode with assistance, awaiting placement     Goal: Individualization and Mutuality  Outcome: Ongoing (interventions implemented as appropriate)   09/19/19 1619   Individualization   Patient Specific Preferences none voiced       Problem: Fall Risk (Adult)  Goal: Absence of Fall  Outcome: Ongoing (interventions implemented as appropriate)   09/19/19 1619   Fall Risk (Adult)   Absence of Fall making progress toward outcome       Problem: Pain, Chronic (Adult)  Goal: Acceptable Pain/Comfort Level and Functional Ability  Outcome: Ongoing (interventions implemented as appropriate)   09/19/19 1619   Pain, Chronic (Adult)   Acceptable Pain/Comfort Level and Functional Ability making progress toward outcome       Problem: Skin Injury Risk (Adult)  Goal: Skin Health and Integrity  Outcome: Ongoing (interventions implemented as appropriate)   09/19/19 1619   Skin Injury Risk (Adult)   Skin Health and Integrity making progress toward outcome

## 2019-09-19 NOTE — NURSING NOTE
Continued Stay Note  VICKY Sharma     Patient Name: Joya Singh  MRN: [id number]  Today's Date: 9/19/2019    Admit Date: 9/16/2019    Discharge Plan     Row Name 09/19/19 5527       Plan    Plan Comments  Phone call from pt's son stated the family prefers to go to [facility] for short term rehab.  Referral called to Sarah at [facility] for referral.  Waiting results of presert.      Row Name 09/19/19 1319       Plan    Plan Comments  Call placed to Heydi at [facility] for referral.  Today at 0900  Waiting decision of acceptance.          Discharge Codes    No documentation.             Domenic Stratton RN

## 2019-09-19 NOTE — NURSING NOTE
Continued Stay Note  VICKY Sharma     Patient Name: Joya Singh  MRN: [id number]  Today's Date: 9/19/2019    Admit Date: 9/16/2019    Discharge Plan     Row Name 09/19/19 1319       Plan    Plan Comments  [facility] to [facility]di [facility] for [facility].  Today at 0900  Waiting decision of acceptance.          Discharge Codes    No documentation.             Domenic Stratton RN

## 2019-09-19 NOTE — THERAPY EVALUATION
Acute Care - Physical Therapy Initial Evaluation  VICKY Sharma     Patient Name: Joya Singh  : 1940  MRN: [id number]  Today's Date: 2019   Onset of Illness/Injury or Date of Surgery: 19  Date of Referral to PT: 19  Referring Physician: Dr. Lua      Admit Date: 2019    Visit Dx:     ICD-10-CM ICD-9-CM   1. Right knee pain, unspecified chronicity M25.561 719.46     Patient Active Problem List   Diagnosis   • Deep vein thrombosis of lower extremity (CMS/[facility])   • Arthritis   • Chronic back pain   • Chronic anemia   • Type 2 diabetes mellitus with neurologic complication (CMS/[facility])   • Brittle diabetes mellitus (CMS/[facility])   • Hyperlipidemia   • Hypertension   • Insomnia with sleep apnea   • Obstructive sleep apnea syndrome   • Peripheral neuropathy   • Diabetic gastroparesis (CMS/[facility])   • Primary localized osteoarthrosis of left shoulder region   • Rotator cuff tendonitis   • Acquired hypothyroidism   • Anxiety   • Gastroesophageal reflux disease   • History of biliary T-tube placement   • CKD stage 3 due to type 2 diabetes mellitus (CMS/[facility])   • Complex tear of medial meniscus of left knee as current injury   • Insufficiency fracture of tibia   • Primary osteoarthritis of left knee   • Orthostatic hypotension   • Tendinitis of right rotator cuff   • AC joint arthropathy   • Subacromial impingement of right shoulder   • Right carpal tunnel syndrome   • Anemia requiring transfusions   • Monoclonal gammopathy of unknown significance (MGUS)   • Myelodysplastic syndrome with 5q deletion (CMS/[facility])   • History of deep venous thrombosis (DVT) of distal vein of left lower extremity   • Moderate episode of recurrent major depressive disorder (CMS/[facility])   • Chronic diastolic CHF (congestive heart failure) (CMS/[facility])   • Knee pain     Past Medical History:   Diagnosis Date   • Allergic rhinitis    • Anemia    • Anxiety    • Appetite absent    • Arthritis    • Asthma    • Back pain    • Bell's palsy     • Black tarry stools    • Blood in stool    • Chronic fatigue    • CKD (chronic kidney disease) stage 3, GFR 30-59 ml/min (CMS/[facility])    • Community acquired pneumonia of left lung 10/25/2018   • Cough    • Depression    • Diabetes mellitus (CMS/HCC)     LAST A1C 6   • Diabetic gastroparesis (CMS/HCC) 2/19/2016   • Difficulty walking    • Excessive urination at night    • Frequent urination    • GERD (gastroesophageal reflux disease)    • GI bleed    • Gout    • H/O blood clots     LEFT LEG 7 OR 8 YEARS AGO   • Heat intolerance    • History of fall 10/2018   • History of prior pregnancies     x8, miscarriage 5   • History of transfusion 11/2018    due to anemia   • Hyperlipidemia    • Hypertension    • Hypothyroidism    • Normal coronary arteries     by cath 2013   • Orthostatic hypotension    • AARON (obstructive sleep apnea)     DOESNT WEAR REGULARLY   • PONV (postoperative nausea and vomiting)    • Skin cancer    • Stroke (CMS/HCC)     Several mini-strokes   • TIA (transient ischemic attack)     LAST TIA JULY 2017   • Urination pain    • UTI (urinary tract infection)     Dec 2018 and Jan 2019     Past Surgical History:   Procedure Laterality Date   • BACK SURGERY      HARDWARE   • CHOLECYSTECTOMY      OPEN   • COLONOSCOPY  2011    due for repeat in 2021   • COLONOSCOPY N/A 10/28/2018    Procedure: COLONOSCOPY;  Surgeon: Emmanuel Rogers MD;  Location: [facility] OR;  Service: Gastroenterology   • ENDOSCOPY N/A 10/26/2018    Procedure: ESOPHAGOGASTRODUODENOSCOPY;  Surgeon: Emmanuel Rogers MD;  Location:  [facility] OR;  Service: Gastroenterology   • HYSTERECTOMY      PARTIAL    • KNEE SURGERY     • NECK SURGERY     • SPINE SURGERY     • TUMOR REMOVAL Left     Leg   • UPPER GASTROINTESTINAL ENDOSCOPY  2014    gastritis.  done by dr. hastings        PT ASSESSMENT (last 12 hours)      Physical Therapy Evaluation     Row Name 09/19/19 0845          PT Evaluation Time/Intention    Subjective Information   complains of;pain  -JW     Document Type  evaluation  -JW     Mode of Treatment  physical therapy  -JW     Patient Effort  adequate  -JW     Symptoms Noted During/After Treatment  increased pain  -JW     Comment  Pt reports 10/10 pain in her foot. She reports after her knee injection the pain moved out of her knee and down to her foot.   -JW     Row Name 09/19/19 0858          General Information    Patient Profile Reviewed?  yes  -JW     Onset of Illness/Injury or Date of Surgery  09/18/19  -JW     Referring Physician  Dr. Lua  -JW     Patient Observations  alert  -JW     Patient/Family Observations  Pt up in chair upon arrival  -JW     General Observations of Patient  pt with legs elevated with knee immbolizer on RLE  -JW     Prior Level of Function  independent:;gait;transfer;community mobility;all household mobility  -JW     Equipment Currently Used at Home  cane, straight;wheelchair;walker, rolling  -JW     Pertinent History of Current Functional Problem  Pt dx with meniscal repair and knee injection  -JW     Existing Precautions/Restrictions  fall  -JW     Risks Reviewed  patient:;LOB  -JW     Row Name 09/19/19 0814          Relationship/Environment    Primary Source of Support/Comfort  child(isabel) adult son  -JW     Lives With  child(isabel), adult he works during the day  -JW     Row Name 09/19/19 0802          Resource/Environmental Concerns    Current Living Arrangements  home/apartment/condo  -JW     Resource/Environmental Concerns  home accessibility  -JW     Home Accessibility Concerns  stairs to enter home  -JW     Transportation Concerns  car, none  -JW     Row Name 09/19/19 0845          Living Environment    Living Arrangements  house  -JW     Home Accessibility  stairs to enter home  -JW     Living Environment Comment  pt does not think she can get up her stairs at home due to pain  -JW     Row Name 09/19/19 0895          Home Main Entrance    Number of Stairs, Main Entrance  two  -JW     Stair  Railings, Main Entrance  railings safe and in good condition  -[facility] Name 09/19/19 0845          Cognitive Assessment/Intervention- PT/OT    Orientation Status (Cognition)  oriented x 3  -     Follows Commands (Cognition)  WNL  -[facility] Name 09/19/19 0845          Safety Issues, Functional Mobility    Impairments Affecting Function (Mobility)  pain  -[facility] Name 09/19/19 0845          Bed Mobility Assessment/Treatment    Comment (Bed Mobility)  defered pt up in chair upon arrival  -JW     Row Name 09/19/19 0845          Transfer Assessment/Treatment    Transfer Assessment/Treatment  sit-stand transfer;stand-sit transfer  -     Sit-Stand [place] (Transfers)  moderate assist (50% patient effort);verbal cues  -     Stand-Sit [place] (Transfers)  moderate assist (50% patient effort);nonverbal cues (demo/gesture)  -JW     Row Name 09/19/19 0845          Sit-Stand Transfer    Assistive Device (Sit-Stand Transfers)  walker, front-wheeled  -JW     Row Name 09/19/19 0845          Stand-Sit Transfer    Assistive Device (Stand-Sit Transfers)  walker, front-wheeled  -JW     Row Name 09/19/19 0845          Gait/Stairs Assessment/Training    Gait/Stairs Assessment/Training  gait/ambulation assistive device  -     [place] Level (Gait)  moderate assist (50% patient effort);verbal cues  -     Assistive Device (Gait)  walker, front-wheeled  -     Distance in Feet (Gait)  2  -JW     Pattern (Gait)  step-to  -JW     Deviations/Abnormal Patterns (Gait)  right sided deviations  -     Comment (Gait/Stairs)  pt c.o increased pain with standing  -JW     Row Name 09/19/19 0845          Sensory Assessment/Intervention    Sensory General Assessment  no sensation deficits identified  -JW     Row Name 09/19/19 0845          Pain Assessment    Additional Documentation  Pain Scale: Word Pre/Post-Treatment (Group);Pain Scale: Numbers Pre/Post-Treatment (Group)  -JW     Row Name 09/19/19 0845          Pain  Scale: Numbers Pre/Post-Treatment    Pain Scale: Numbers, Pretreatment  10/10  -JW     Pain Scale: Numbers, Post-Treatment  10/10  -JW     Pain Location - Side  Right  -JW     Pain Location  foot  -JW     Pre/Post Treatment Pain Comment  pt reports 10/10 pain at all times. Pt eating her breakfast at this time   -JW     Pain Intervention(s)  Repositioned  -JW     Row Name 09/19/19 0845          Coping    Observed Emotional State  anxious  -JW     Verbalized Emotional State  anxiety  -JW     Row Name 09/19/19 0845          Plan of Care Review    Plan of Care Reviewed With  patient  -JW     Row Name 09/19/19 0845          Physical Therapy Clinical Impression    Date of Referral to PT  09/18/19  -JW     PT Diagnosis (PT Clinical Impression)  decreased mobility d/t pain  -JW     Prognosis (PT Clinical Impression)  fair  -JW     Functional Level at Time of Evaluation (PT Clinical Impression)  decreased mobility  -JW     Patient/Family Goals Statement (PT Clinical Impression)  pt wants to go to rehab prior to returning home  -JW     Criteria for Skilled Interventions Met (PT Clinical Impression)  yes;treatment indicated  -JW     Pathology/Pathophysiology Noted (Describe Specifically for Each System)  musculoskeletal  -JW     Impairments Found (describe specific impairments)  gait, locomotion, and balance;ROM;motor function  -JW     Functional Limitations in Following Categories (Describe Specific Limitations)  home management;self-care  -JW     Rehab Potential (PT Clinical Summary)  fair, will monitor progress closely  -JW     Predicted Duration of Therapy (PT)  3 days  -JW     Care Plan Review (PT)  evaluation/treatment results reviewed  -JW     Patient/Family Concerns, Equipment Needs at Discharge (PT)  pt wants a rollator  -JW     Patient/Family Concerns, Anticipated Discharge Disposition (PT)  pt wants to go to rehab prior to home  -JW     Row Name 09/19/19 0845          Physical Therapy Goals    Bed Mobility Goal  Selection (PT)  bed mobility, PT goal 1  -JW     Transfer Goal Selection (PT)  transfer, PT goal 1  -JW     Gait Training Goal Selection (PT)  gait training, PT goal 1  -     Row Name 09/19/19 0845          Bed Mobility Goal 1 (PT)    Activity/Assistive Device (Bed Mobility Goal 1, PT)  bed mobility activities, all  -JW     [place] Level/Cues Needed (Bed Mobility Goal 1, PT)  independent  -JW     Time Frame (Bed Mobility Goal 1, PT)  3 days  -JW     Barriers (Bed Mobility Goal 1, PT)  pain  -JW     Progress/Outcomes (Bed Mobility Goal 1, PT)  goal ongoing  -     Row Name 09/19/19 0845          Transfer Goal 1 (PT)    Activity/Assistive Device (Transfer Goal 1, PT)  transfers, all  -JW     [place] Level/Cues Needed (Transfer Goal 1, PT)  supervision required  -JW     Time Frame (Transfer Goal 1, PT)  3 days  -JW     Barriers (Transfers Goal 1, PT)  pain  -JW     Progress/Outcome (Transfer Goal 1, PT)  goal ongoing  -     Row Name 09/19/19 0845          Gait Training Goal 1 (PT)    Activity/Assistive Device (Gait Training Goal 1, PT)  gait (walking locomotion);walker, rolling  -JW     [place] Level (Gait Training Goal 1, PT)  supervision required  -JW     Distance (Gait Goal 1, PT)  25  -JW     Time Frame (Gait Training Goal 1, PT)  3 days  -JW     Barriers (Gait Training Goal 1, PT)  pain  -JW     Progress/Outcome (Gait Training Goal 1, PT)  goal ongoing  -     Row Name 09/19/19 0845          Positioning and Restraints    Pre-Treatment Position  sitting in chair/recliner  -JW     Post Treatment Position  chair  -JW     In Chair  notified nsg;reclined;sitting;call light within reach  -JW       User Key  (r) = Recorded By, (t) = Taken By, (c) = Cosigned By    Initials Name Provider Type    JW Syl Avila PT Physical Therapist        Physical Therapy Education     Title: PT OT SLP Therapies (Done)     Topic: Physical Therapy (Done)     Point: Mobility training (Done)     Learning Progress Summary            Patient Acceptance, E,TB, VU by  at 9/19/2019 11:30 AM                   Point: Home exercise program (Done)     Learning Progress Summary           Patient Acceptance, E,TB, VU by  at 9/19/2019 11:30 AM                   Point: Body mechanics (Done)     Learning Progress Summary           Patient Acceptance, E,TB, VU by  at 9/19/2019 11:30 AM                   Point: Precautions (Done)     Learning Progress Summary           Patient Acceptance, E,TB, VU by  at 9/19/2019 11:30 AM                               User Key     Initials Effective Dates Name Provider Type Discipline     06/20/18 -  Syl Avila, PT Physical Therapist PT              PT Recommendation and Plan  Anticipated Discharge Disposition (PT): skilled nursing facility  Planned Therapy Interventions (PT Eval): gait training, bed mobility training, balance training, ROM (range of motion), patient/family education, strengthening, transfer training, home exercise program  Therapy Frequency (PT Clinical Impression): 5 times/wk  Outcome Summary/Treatment Plan (PT)  Patient/Family Concerns, Equipment Needs at Discharge (PT): pt wants a rollator  Anticipated Discharge Disposition (PT): skilled nursing facility  Patient/Family Concerns, Anticipated Discharge Disposition (PT): pt wants to go to rehab prior to home  Plan of Care Reviewed With: patient  Progress: improving  Outcome Summary: Pt evaluated by PT today. Pt was onyl able to ambulate 2 ft with rwx due to pain and weakness. Pt reports 10/10 pain at all times and nothing helps it however she is able to sit relaxed and eat breakfast without difficulty. Pt required mod A to transfer sit <-->stand . Pt reports she will not be able to return home die to not being able to get up/down steps and has no help during the day. Pt will be seen by PT 5x/wk,  Outcome Measures     Row Name 09/19/19 0846             How much help from another person do you currently need...    Turning from your back  to your side while in flat bed without using bedrails?  2  -JW      Moving from lying on back to sitting on the side of a flat bed without bedrails?  2  -JW      Moving to and from a bed to a chair (including a wheelchair)?  2  -JW      Standing up from a chair using your arms (e.g., wheelchair, bedside chair)?  2  -JW      Climbing 3-5 steps with a railing?  1  -JW      To walk in hospital room?  2  -JW      AM-PAC 6 Clicks Score (PT)  11  -         Functional Assessment    Outcome Measure Options  AM-PAC 6 Clicks Basic Mobility (PT)  -JW        User Key  (r) = Recorded By, (t) = Taken By, (c) = Cosigned By    Initials Name Provider Type    Syl Mcgowan PT Physical Therapist         Time Calculation:   PT Charges     Row Name 09/19/19 1137             Time Calculation    Start Time  0845  -      Stop Time  0905  -      Time Calculation (min)  20 min  -        User Key  (r) = Recorded By, (t) = Taken By, (c) = Cosigned By    Initials Name Provider Type    Syl Mcgowan PT Physical Therapist        Therapy Charges for Today     Code Description Service Date Service Provider Modifiers Qty    05646851931 HC PT EVAL LOW COMPLEXITY 4 9/19/2019 Syl Avila, PT GP 1          PT G-Codes  Outcome Measure Options: AM-PAC 6 Clicks Basic Mobility (PT)  AM-PAC 6 Clicks Score (PT): Rahat Ortiz, PT  9/19/2019

## 2019-09-19 NOTE — PLAN OF CARE
Problem: Patient Care Overview  Goal: Plan of Care Review  Outcome: Ongoing (interventions implemented as appropriate)      Problem: Fall Risk (Adult)  Goal: Absence of Fall  Outcome: Ongoing (interventions implemented as appropriate)      Problem: Pain, Chronic (Adult)  Goal: Acceptable Pain/Comfort Level and Functional Ability  Outcome: Ongoing (interventions implemented as appropriate)      Problem: Skin Injury Risk (Adult)  Goal: Skin Health and Integrity  Outcome: Ongoing (interventions implemented as appropriate)

## 2019-09-19 NOTE — PLAN OF CARE
Problem: Patient Care Overview  Goal: Plan of Care Review   09/19/19 1131   Coping/Psychosocial   Plan of Care Reviewed With patient   Plan of Care Review   Progress improving   OTHER   Outcome Summary Pt evaluated by PT today. Pt was onyl able to ambulate 2 ft with rwx due to pain and weakness. Pt reports 10/10 pain at all times and nothing helps it however she is able to sit relaxed and eat breakfast without difficulty. Pt required mod A to transfer sit <-->stand . Pt reports she will not be able to return home die to not being able to get up/down steps and has no help during the day. Pt will be seen by PT 5x/wk,

## 2019-09-19 NOTE — PROGRESS NOTES
"Hospitalist Team      Patient Care Team:  Chari Mercado MD as PCP - General  Chari Mercado MD as PCP - Family Medicine  Christos Rob MD as Surgeon (General Surgery)  German Wylie MD as Surgeon (Orthopedic Surgery)  Yasmani Baugh MD as Consulting Physician (Nephrology)  Yasmani Baugh MD as Referring Physician (Nephrology)  Elieser Headley MD as Consulting Physician (Hematology and Oncology)        Chief Complaint:  Right knee pain/ Torn Meniscus    Subjective    Interval History and ROS:     Patient States Mildly better than yesterday but still pain  Patient Complaints: Pain right knee. She states she cannot ambulate on this leg still.  She thinks the injection has helped \"a little\"  Patient Denies:  Nausea or vomiting  History taken from: patient chart RN      Objective    Vital Signs  Temp:  [97.2 °F (36.2 °C)-98.4 °F (36.9 °C)] 97.2 °F (36.2 °C)  Heart Rate:  [77-92] 92  Resp:  [18-20] 18  BP: (118-168)/(57-71) 168/71  Oxygen Therapy  SpO2: 90 %  Pulse Oximetry Type: Intermittent  Device (Oxygen Therapy): room air}  Flowsheet Rows      First Filed Value   Admission Height  165 cm (64.96\") Documented at 09/16/2019 1326   Admission Weight  92.1 kg (203 lb) Documented at 09/16/2019 1326            Physical Exam:    Physical Exam    Results Review:     I reviewed the patient's new clinical results.    Lab Results (last 24 hours)     Procedure Component Value Units Date/Time    Basic Metabolic Panel [[id number]]  (Abnormal) Collected:  09/19/19 0547    Specimen:  Blood Updated:  09/19/19 0655     Glucose 279 mg/dL      BUN 24 mg/dL      Creatinine 1.83 mg/dL      Sodium 137 mmol/L      Potassium 5.1 mmol/L      Chloride 98 mmol/L      CO2 30.5 mmol/L      Calcium 9.4 mg/dL      eGFR Non African Amer 27 mL/min/1.73      BUN/Creatinine Ratio 13.1     Anion Gap 8.5 mmol/L     Narrative:       GFR Normal >60  Chronic Kidney Disease <60  Kidney Failure <15    CBC & " Differential [[id number]] Collected:  09/19/19 0547    Specimen:  Blood Updated:  09/19/19 0655    Narrative:       The following orders were created for panel order CBC & Differential.  Procedure                               Abnormality         Status                     ---------                               -----------         ------                     CBC Auto Differential[[id number]]        Abnormal            Final result                 Please view results for these tests on the individual orders.    CBC Auto Differential [[id number]]  (Abnormal) Collected:  09/19/19 0547    Specimen:  Blood Updated:  09/19/19 0655     WBC 6.39 10*3/mm3      RBC 2.51 10*6/mm3      Hemoglobin 8.7 g/dL      Hematocrit 26.8 %      .8 fL      MCH 34.7 pg      MCHC 32.5 g/dL      RDW 20.4 %      RDW-SD 77.7 fl      MPV 11.5 fL      Platelets 322 10*3/mm3      Neutrophil % 71.8 %      Lymphocyte % 18.2 %      Monocyte % 4.7 %      Eosinophil % 2.8 %      Basophil % 0.8 %      Immature Grans % 1.7 %      Neutrophils, Absolute 4.59 10*3/mm3      Lymphocytes, Absolute 1.16 10*3/mm3      Monocytes, Absolute 0.30 10*3/mm3      Eosinophils, Absolute 0.18 10*3/mm3      Basophils, Absolute 0.05 10*3/mm3      Immature Grans, Absolute 0.11 10*3/mm3      nRBC 0.0 /100 WBC     Scan Slide [[id number]] Collected:  09/19/19 0547    Specimen:  Blood Updated:  09/19/19 0645    POC Glucose Once [[id number]]  (Abnormal) Collected:  09/18/19 2030    Specimen:  Blood Updated:  09/18/19 2051     Glucose 316 mg/dL     POC Glucose Once [[id number]]  (Abnormal) Collected:  09/18/19 1656    Specimen:  Blood Updated:  09/18/19 1706     Glucose 146 mg/dL     Folate [[id number]]  (Normal) Collected:  09/18/19 0943    Specimen:  Blood Updated:  09/18/19 1634     Folate 15.30 ng/mL     Vitamin B12 [[id number]]  (Normal) Collected:  09/18/19 0943    Specimen:  Blood Updated:  09/18/19 1634     Vitamin B-12 436 pg/mL     Vitamin D 25 Hydroxy [[id number]]   (Abnormal) Collected:  09/18/19 0943    Specimen:  Blood Updated:  09/18/19 1634     25 Hydroxy, Vitamin D 24.9 ng/ml     Narrative:       Reference Range for Total Vitamin D 25(OH)     Deficiency <20.0 ng/mL   Insufficiency 21-29 ng/mL   Sufficiency  ng/mL  Toxicity >100 ng/ml    Scan Slide [[id number]] Collected:  09/18/19 0943    Specimen:  Blood Updated:  09/18/19 1117     Anisocytosis Slight/1+     Macrocytes Slight/1+     WBC Morphology Normal     Large Platelets Slight/1+    POC Glucose Once [[id number]]  (Abnormal) Collected:  09/18/19 1105    Specimen:  Blood Updated:  09/18/19 1116     Glucose 202 mg/dL     Urinalysis, Microscopic Only - Urine, Catheter In/Out [[id number]]  (Abnormal) Collected:  09/18/19 1022    Specimen:  Urine, Catheter In/Out Updated:  09/18/19 1107     RBC, UA 3-5 /HPF      WBC, UA 31-50 /HPF      Bacteria, UA 3+ /HPF      Squamous Epithelial Cells, UA 3-6 /HPF      Hyaline Casts, UA None Seen /LPF      Methodology Manual Light Microscopy    Urine Culture - Urine, Urine, Catheter In/Out [[id number]] Collected:  09/18/19 1022    Specimen:  Urine, Catheter In/Out Updated:  09/18/19 1107    Hemoglobin A1c [[id number]]  (Abnormal) Collected:  09/18/19 0943    Specimen:  Blood Updated:  09/18/19 1053     Hemoglobin A1C 8.00 %     Narrative:       Hemoglobin A1C Ranges:    Increased Risk for Diabetes  5.7% to 6.4%  Diabetes                     >= 6.5%  Diabetic Goal                < 7.0%    Urinalysis With Culture If Indicated - Urine, Catheter In/Out [[id number]]  (Abnormal) Collected:  09/18/19 1022    Specimen:  Urine, Catheter In/Out Updated:  09/18/19 1049     Color, UA Other     Appearance, UA Slightly Cloudy     pH, UA 7.0     Specific Gravity, UA 1.020     Glucose, UA Negative     Ketones, UA Negative     Bilirubin, UA Negative     Blood, UA Trace     Protein, UA 30 mg/dL (1+)     Leuk Esterase, UA Moderate (2+)     Nitrite, UA Negative     Urobilinogen, UA 0.2 E.U./dL    TSH  [[id number]]  (Abnormal) Collected:  09/18/19 0943    Specimen:  Blood Updated:  09/18/19 1014     TSH 6.340 uIU/mL     Comprehensive Metabolic Panel [[id number]]  (Abnormal) Collected:  09/18/19 0943    Specimen:  Blood Updated:  09/18/19 1007     Glucose 195 mg/dL      BUN 20 mg/dL      Creatinine 1.78 mg/dL      Sodium 141 mmol/L      Potassium 4.1 mmol/L      Chloride 101 mmol/L      CO2 29.0 mmol/L      Calcium 8.9 mg/dL      Total Protein 6.0 g/dL      Albumin 3.60 g/dL      ALT (SGPT) 5 U/L      AST (SGOT) 7 U/L      Alkaline Phosphatase 93 U/L      Total Bilirubin 0.8 mg/dL      eGFR Non African Amer 28 mL/min/1.73      Globulin 2.4 gm/dL      A/G Ratio 1.5 g/dL      BUN/Creatinine Ratio 11.2     Anion Gap 11.0 mmol/L     Narrative:       GFR Normal >60  Chronic Kidney Disease <60  Kidney Failure <15    CBC & Differential [[id number]] Collected:  09/18/19 0943    Specimen:  Blood Updated:  09/18/19 0956    Narrative:       The following orders were created for panel order CBC & Differential.  Procedure                               Abnormality         Status                     ---------                               -----------         ------                     CBC Auto Differential[[id number]]        Abnormal            Final result                 Please view results for these tests on the individual orders.    CBC Auto Differential [[id number]]  (Abnormal) Collected:  09/18/19 0943    Specimen:  Blood Updated:  09/18/19 0956     WBC 4.34 10*3/mm3      RBC 2.37 10*6/mm3      Hemoglobin 8.2 g/dL      Hematocrit 26.2 %      .5 fL      MCH 34.6 pg      MCHC 31.3 g/dL      RDW 20.5 %      RDW-SD 82.3 fl      MPV 11.3 fL      Platelets 325 10*3/mm3      Neutrophil % 43.8 %      Lymphocyte % 37.1 %      Monocyte % 4.4 %      Eosinophil % 6.0 %      Basophil % 1.6 %      Immature Grans % 7.1 %      Neutrophils, Absolute 1.90 10*3/mm3      Lymphocytes, Absolute 1.61 10*3/mm3      Monocytes, Absolute 0.19  10*3/mm3      Eosinophils, Absolute 0.26 10*3/mm3      Basophils, Absolute 0.07 10*3/mm3      Immature Grans, Absolute 0.31 10*3/mm3     POC Glucose Once [[id number]]  (Abnormal) Collected:  09/18/19 0716    Specimen:  Blood Updated:  09/18/19 0741     Glucose 166 mg/dL           Imaging Results (last 24 hours)     Procedure Component Value Units Date/Time    SCANNED - IMAGING [[id number]] Resulted:  09/16/19      Updated:  09/18/19 1332          Xray not reviewed personally by physician.      ECG not reviewed personally by physician  ECG/EMG Results (most recent)     Procedure Component Value Units Date/Time    ECG 12 Lead [[id number]] Collected:  09/18/19 1357     Updated:  09/18/19 1620    Narrative:       HEART RATE= 80  bpm  RR Interval= 752  ms  AZ Interval= 172  ms  P Horizontal Axis= 8  deg  P Front Axis= 68  deg  QRSD Interval= 147  ms  QT Interval= 404  ms  QRS Axis= -59  deg  T Wave Axis= 15  deg  - ABNORMAL ECG -  Sinus rhythm  RBBB and LAFB  NO SIGNIFICANT CHANGE FROM PREVIOUS ECG  Electronically Signed By: Kehinde Sanon ([facility]) 18-Sep-2019 16:20:16  Date and Time of Study: 2019-09-18 13:57:51          Medication Review:   I have reviewed the patient's current medication list    Current Facility-Administered Medications:   •  acetaminophen (TYLENOL) tablet 650 mg, 650 mg, Oral, Q6H PRN, Renetta Lua, DO  •  albuterol (PROVENTIL) nebulizer solution 0.083% 2.5 mg/3mL, 2.5 mg, Nebulization, Q4H PRN, Renetta Lua, DO  •  atorvastatin (LIPITOR) tablet 10 mg, 10 mg, Oral, Nightly, Renetta Lua, DO, 10 mg at 09/18/19 2057  •  benzonatate (TESSALON) capsule 200 mg, 200 mg, Oral, TID PRN, Renetta Lua, DO  •  calcium carbonate (TUMS) chewable tablet 500 mg (200 mg elemental), 2 tablet, Oral, Daily, Renetta Lua, , 2 tablet at 09/18/19 1006  •  cyclobenzaprine (FLEXERIL) tablet 10 mg, 10 mg, Oral, TID, Alison Olivera, APRN, 10 mg at 09/18/19 2058  •  desvenlafaxine (PRISTIQ) 24 hr  tablet 50 mg, 50 mg, Oral, Daily, Renetta Lua DO, 50 mg at 09/18/19 1007  •  dextrose (D50W) 25 g/ 50mL Intravenous Solution 25 g, 25 g, Intravenous, Q15 Min PRN, Alison Olivera APRN  •  dextrose (GLUTOSE) oral gel 15 g, 15 g, Oral, Q15 Min PRN, Alison Olviera APRN  •  diphenhydrAMINE (BENADRYL) capsule 25 mg, 25 mg, Oral, Q6H PRN, Renetta Lua DO, 25 mg at 09/18/19 1021  •  diphenhydrAMINE (BENADRYL) capsule 25 mg, 25 mg, Oral, TID, Alison Olivera APRN, 25 mg at 09/18/19 1654  •  diphenhydrAMINE (BENADRYL) capsule 50 mg, 50 mg, Oral, Nightly, Alison Olivera APRN, 50 mg at 09/18/19 2058  •  enoxaparin (LOVENOX) syringe 40 mg, 40 mg, Subcutaneous, Q24H, Renetta Lua DO, 40 mg at 09/18/19 1728  •  fluticasone (FLONASE) 50 MCG/ACT nasal spray 2 spray, 2 spray, Nasal, Daily, Renetta Lua DO, 2 spray at 09/18/19 1007  •  furosemide (LASIX) tablet 20 mg, 20 mg, Oral, Daily, Renetta Lua DO, 20 mg at 09/18/19 1008  •  gabapentin (NEURONTIN) capsule 400 mg, 400 mg, Oral, Q8H, Renetta Lua DO, 400 mg at 09/19/19 0625  •  glucagon (GLUCAGEN) injection 1 mg, 1 mg, Subcutaneous, Q15 Min PRN, Alison Olivera APRN  •  HYDROcodone-acetaminophen (NORCO) 7.5-325 MG per tablet 1 tablet, 1 tablet, Oral, Q4H PRN, Alison Olivera APRN, 1 tablet at 09/19/19 0626  •  insulin aspart (novoLOG) injection 0-7 Units, 0-7 Units, Subcutaneous, 4x Daily AC & at Bedtime, Alison Olivera APRN, 5 Units at 09/18/19 2059  •  insulin aspart (novoLOG) injection 15 Units, 15 Units, Subcutaneous, Daily With Breakfast, Renetta Lua DO, 15 Units at 09/18/19 0856  •  insulin detemir (LEVEMIR) injection 30 Units, 30 Units, Subcutaneous, Nightly, Renetta Lua DO, 30 Units at 09/18/19 2059  •  levothyroxine (SYNTHROID, LEVOTHROID) tablet 112 mcg, 112 mcg, Oral, QAM, Alison Olivera, APRN, 112 mcg at 09/19/19 0626  •  melatonin tablet 5 mg, 5 mg, Oral, Nightly PRN,  Alison Olivera, APRN  •  midodrine (PROAMATINE) tablet 2.5 mg, 2.5 mg, Oral, TID, Ringswald, Madonna, DO, 2.5 mg at 09/18/19 2058  •  multivitamin with minerals 1 tablet, 1 tablet, Oral, Daily, Ringswald, Madonna, DO, 1 tablet at 09/18/19 1009  •  nystatin (MYCOSTATIN) powder, , Topical, Q8H, Ringswald, Madonna, DO, 1 application at 09/19/19 0625  •  pantoprazole (PROTONIX) EC tablet 40 mg, 40 mg, Oral, QAM, Ringswald, Madonna, DO, 40 mg at 09/19/19 0625  •  potassium chloride (K-DUR,KLOR-CON) CR tablet 10 mEq, 10 mEq, Oral, Daily, Ringswald, Madonna, DO, 10 mEq at 09/18/19 1010  •  promethazine (PHENERGAN) tablet 6.25 mg, 6.25 mg, Oral, Q6H PRN, Ringswald, Madonna, DO  •  sodium chloride 0.9 % flush 10 mL, 10 mL, Intravenous, PRN, Ringswald, Madonna, DO  •  sodium chloride 0.9 % flush 10 mL, 10 mL, Intravenous, Q12H, Ringswald, Madonna, DO, 10 mL at 09/18/19 2055  •  sodium chloride 0.9 % infusion 40 mL, 40 mL, Intravenous, PRN, Ringswald, Madonna, DO      Assessment/Plan     Complex tear posterior horn of the medial meniscus. Lateral meniscal degeneration. Mild to moderate cartilage thinning in all 3 compartments with no focal osteochondral defects. These changes are accompanied by moderately large joint effusion and generalized soft tissue swelling around the knee. Signer Name: Casey Quiles MD  Signed: 9/17/2019 7:27 PM  Workstation Name: [id number]  [facility]  Acute right medial meniscus tear: Orthopedic surgery following  Unable to bear weight due to pain, adjust pain medication up to Norco 7.5 mg every 4 hours as needed, add home Flexeril scheduled 3 times daily  Continue gabapentin per home regimen  No surgery planned, injection yesterday per Dr. Wylie  She is out of bed to chair today which is some progress.     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 Obesity  HgbA1c is 8.0  Add Accu-Cheks AC/at bedtime  Add low-dose sliding scale  insulin  Continue home Levemir 30 units nightly, NovoLog 15 units daily with breakfast  Monitor     Chronic macrocytic anemia:  Check B12/folate, no active blood loss noted  Hemoglobin 8.2, past baseline 10-11  Today 8.7  Due for Procrit shot tomorrow, plan injection if discharged.     Chronic pruritus: Add home Benadryl 25 mg 3 times daily and 50 mg nightly per home regimen     Hypertension with chronic orthostatic hypotension:  Add orthostatic vital signs, continue midodrine 2.5 mg 3 times daily, Lasix 20 mg daily     CKD stage III/IV:  Baseline creatinine approximately 1.8 to 1.9  Currently 1.78, no acute issues  Monitor      Hypothyroidism: TSH elevated, increase levothyroxine to 112 mcg daily  Follow-up PCP for recheck TFTs in 6 to 8 weeks     Chronic nocturnal hypoxia/AARON, noncompliance with CPAP: No current acute issues, uses 2 liters oxygen with sleep, ordered for tonight     GERD:  Diabetic gastroparesis: No acute issues currently  Followed by Dr. Clark outpatient, continues tonics 40 mg daily     Hyperlipidemia: No acute issues on Lipitor 10 mg nightly     Anxiety/depression: No acute issues on Pristiq 50 mg daily     H/O TIA 7/2017:   H/O Bell's palsy: No current acute issues     H/O frequent UTIs with chronic urinary frequency/incontinence: Check UA C&S  Female catheter     H/O DVT LLE 8 years ago: No current acute issues on Lovenox for DVT prophylaxis        Plan for disposition:  Patient is requesting rehabilitation visit.  She feels she does not have the help or the capacity herself to take care of herself yet and needs rehabilitation.  I consulted discharge planners.    Renetta Lua DO  09/19/19  7:19 AM      Time: 25 min

## 2019-09-19 NOTE — THERAPY EVALUATION
SNF - Occupational Therapy Initial Evaluation  VICKY Sharma     Patient Name: Joya Singh  : 1940  MRN: [id number]  Today's Date: 2019  Onset of Illness/Injury or Date of Surgery: 19  Date of Referral to OT: 19  Referring Physician: Chanell    Admit Date: 2019       ICD-10-CM ICD-9-CM   1. 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)   • Brittle diabetes mellitus (CMS/HCC)   • Hyperlipidemia   • Hypertension   • Insomnia with sleep apnea   • Obstructive sleep apnea syndrome   • Peripheral neuropathy   • Diabetic gastroparesis (CMS/HCC)   • Primary localized osteoarthrosis of left shoulder region   • Rotator cuff tendonitis   • Acquired hypothyroidism   • Anxiety   • Gastroesophageal reflux disease   • History of biliary T-tube placement   • 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)   • Complex tear of medial meniscus of left knee as current injury   • Insufficiency fracture of tibia   • Primary osteoarthritis of left knee   • Orthostatic hypotension   • Tendinitis of right rotator cuff   • AC joint arthropathy   • Subacromial impingement of right shoulder   • Right carpal tunnel syndrome   • Anemia requiring transfusions   • Monoclonal gammopathy of unknown significance (MGUS)   • Myelodysplastic syndrome with 5q deletion (CMS/HCC)   • History of deep venous thrombosis (DVT) of distal vein of left lower extremity   • Moderate episode of recurrent major depressive disorder (CMS/HCC)   • Chronic diastolic CHF (congestive heart failure) (CMS/[facility])   • Knee pain     Past Medical History:   Diagnosis Date   • Allergic rhinitis    • Anemia    • Anxiety    • Appetite absent    • Arthritis    • Asthma    • Back pain    • Bell's palsy    • Black tarry stools     • Blood in stool    • Chronic fatigue    • CKD (chronic kidney disease) stage 3, GFR 30-59 ml/min (CMS/[facility])    • Community acquired pneumonia of left lung 10/25/2018   • Cough    • Depression    • Diabetes mellitus (CMS/[facility])     LAST A1C 6   • Diabetic gastroparesis (CMS/HCC) 2/19/2016   • Difficulty walking    • Excessive urination at night    • Frequent urination    • GERD (gastroesophageal reflux disease)    • GI bleed    • Gout    • H/O blood clots     LEFT LEG 7 OR 8 YEARS AGO   • Heat intolerance    • History of fall 10/2018   • History of prior pregnancies     x8, miscarriage 5   • History of transfusion 11/2018    due to anemia   • Hyperlipidemia    • Hypertension    • Hypothyroidism    • Normal coronary arteries     by cath 2013   • Orthostatic hypotension    • AARON (obstructive sleep apnea)     DOESNT WEAR REGULARLY   • PONV (postoperative nausea and vomiting)    • Skin cancer    • Stroke (CMS/[facility])     Several mini-strokes   • TIA (transient ischemic attack)     LAST TIA JULY 2017   • Urination pain    • UTI (urinary tract infection)     Dec 2018 and Jan 2019     Past Surgical History:   Procedure Laterality Date   • BACK SURGERY      HARDWARE   • CHOLECYSTECTOMY      OPEN   • COLONOSCOPY  2011    due for repeat in 2021   • COLONOSCOPY N/A 10/28/2018    Procedure: COLONOSCOPY;  Surgeon: Emmanuel Rogers MD;  Location: [facility] OR;  Service: Gastroenterology   • ENDOSCOPY N/A 10/26/2018    Procedure: ESOPHAGOGASTRODUODENOSCOPY;  Surgeon: Emmanuel Rogers MD;  Location: [facility] OR;  Service: Gastroenterology   • HYSTERECTOMY      PARTIAL    • KNEE SURGERY     • NECK SURGERY     • SPINE SURGERY     • TUMOR REMOVAL Left     Leg   • UPPER GASTROINTESTINAL ENDOSCOPY  2014    gastritis.  done by dr. hastings          OT ASSESSMENT FLOWSHEET (last 12 hours)      Occupational Therapy Evaluation     Row Name 09/19/19 9955                   OT Evaluation Time/Intention    Subjective Information   "complains of;pain  -JJ        Document Type  evaluation  -JJ        Mode of Treatment  occupational therapy  -JJ        Patient Effort  adequate  -JJ        Comment  pt with complaints of pain in her R foot  -JJ           General Information    Patient Profile Reviewed?  yes  -JJ        Onset of Illness/Injury or Date of Surgery  09/16/19  -JJ        Referring Physician  Chanell  -JJ        Patient Observations  alert;agree to therapy  -JJ        Patient/Family Observations  pt reclined in bedside chair, knee immobilizer on R LE agreeable to evaluation  -JJ        Prior Level of Function  -- see pertinent hx of current problem  -JJ        Equipment Currently Used at Home  cane, straight;walker, rolling;wheelchair  -JJ        Pertinent History of Current Functional Problem  pt came into hospital with complaints of R knee pain, states \"twisted knee and felt a pop\". pt dx with meniscal tear. pt received cortisone injection in knee yesterday by ortho MD. Pt now with complaints of significnat pain in R foot. pt states prior to injury she ambualted short distances wtih walker and used wheelchair for longer distances. She states she was independent with adls and used shower chair for bathing. She also lives with son and daughter in law who provide all meals and house managment  -JJ        Existing Precautions/Restrictions  fall  -JJ        Risks Reviewed  patient:;increased discomfort  -JJ        Benefits Reviewed  patient:;increase independence;improve function  -JJ           Relationship/Environment    Lives With  child(isabel), adult pt states family works during the day  -JJ           Resource/Environmental Concerns    Current Living Arrangements  home/apartment/condo  -JJ           Home Main Entrance    Number of Stairs, Main Entrance  two  -JJ        Stair Railings, Main Entrance  railings safe and in good condition  -JJ           Cognitive Assessment/Interventions    Additional Documentation  Cognitive " Assessment/Intervention (Group)  -JJ           Cognitive Assessment/Intervention- PT/OT    Orientation Status (Cognition)  oriented x 3  -JJ        Follows Commands (Cognition)  WNL  -JJ        Personal Safety Interventions  gait belt;nonskid shoes/slippers when out of bed  -JJ           Safety Issues, Functional Mobility    Impairments Affecting Function (Mobility)  pain  -JJ           Bed Mobility Assessment/Treatment    Comment (Bed Mobility)  deferred up in chair  -JJ           Transfer Assessment/Treatment    Transfer Assessment/Treatment  sit-stand transfer;stand-sit transfer  -        Comment (Transfers)  pt transferred from recliner chair and BSC with CGA and verbal cues for safety  -JJ           Sit-Stand Transfer    Sit-Stand Wilkinson (Transfers)  contact guard;verbal cues  -        Assistive Device (Sit-Stand Transfers)  walker, front-wheeled  -JJ           Stand-Sit Transfer    Stand-Sit Wilkinson (Transfers)  contact guard;verbal cues  -J        Assistive Device (Stand-Sit Transfers)  walker, front-wheeled  -J           Bathing Assessment/Intervention    Bathing Wilkinson Level  minimum assist (75% patient effort);lower body;bathing skills  -J           Lower Body Dressing Assessment/Training    Lower Body Dressing Wilkinson Level  lower body dressing skills;minimum assist (75% patient effort)  -JJ           General ROM    GENERAL ROM COMMENTS  B UE AROM WFL with exception of L shoulder , miniaml AROM from chronic shoulder injury  -JJ           MMT (Manual Muscle Testing)    General MMT Comments  R UE 4-/5, L UE not tested 2 to co pain  -JJ           Positioning and Restraints    Pre-Treatment Position  sitting in chair/recliner  -JJ        Post Treatment Position  chair  -JJ        In Chair  reclined;call light within reach;encouraged to call for assist  -JJ           Pain Scale: Numbers Pre/Post-Treatment    Pain Scale: Numbers, Pretreatment  8/10  -JJ        Pain Scale: Numbers,  "Post-Treatment  8/10  -JJ        Pain Location - Side  Right  -JJ        Pain Location  foot  -JJ        Pain Intervention(s)  Repositioned  -JJ           Plan of Care Review    Plan of Care Reviewed With  patient  -JJ           Clinical Impression (OT)    Date of Referral to OT  09/17/19  -JJ        OT Diagnosis  decreased adl sp knee pain  -JJ        Prognosis (OT Eval)  fair  -JJ        Patient/Family Goals Statement (OT Eval)  pt states she wants to go to rehab because she \"cant go home and take care of herself\"  -JJ        Criteria for Skilled Therapeutic Interventions Met (OT Eval)  yes;treatment indicated  -JJ        Rehab Potential (OT Eval)  fair, will monitor progress closely  -JJ        Therapy Frequency (OT Eval)  3 times/wk  -JJ        Predicted Duration of Therapy Intervention (Therapy Eval)  x 1 week  -JJ        Care Plan Review (OT)  evaluation/treatment results reviewed;care plan/treatment goals reviewed;risks/benefits reviewed  -JJ        Anticipated Equipment Needs at Discharge (OT)  -- pt may benefit from reacher  -JJ        Anticipated Discharge Disposition (OT)  skilled nursing facility;home with home health  -JJ           Planned OT Interventions    Planned Therapy Interventions (OT Eval)  functional balance retraining;BADL retraining  -JJ           Transfer Goal 1 (OT)    Activity/Assistive Device (Transfer Goal 1, OT)  toilet;commode, 3-in-1;walker, rolling  -JJ        Piatt Level/Cues Needed (Transfer Goal 1, OT)  supervision required  -JJ        Time Frame (Transfer Goal 1, OT)  5 days  -JJ        Barriers (Transfers Goal 1, OT)  pain  -JJ        Progress/Outcome (Transfer Goal 1, OT)  -- new goal  -JJ           Dressing Goal 1 (OT)    Activity/Assistive Device (Dressing Goal 1, OT)  lower body dressing  -JJ        Piatt/Cues Needed (Dressing Goal 1, OT)  standby assist  -JJ        Time Frame (Dressing Goal 1, OT)  5 days  -JJ        Barriers (Dressing Goal 1, OT)  pain  -JJ  "       Progress/Outcome (Dressing Goal 1, OT)  -- new goal  -J           Balance Goal 1 (OT)    Activity/Assistive Device (Balance Goal 1, OT)  standing, dynamic  -JJ        Fackler Level/Cues Needed (Balance Goal 1, OT)  -- x 1-2 minutes to increase I with adls  -JJ        Time Frame (Balance Goal 1, OT)  5 days  -JJ        Barriers (Balance Goal 1, OT)  pain  -JJ        Progress/Outcomes (Balance Goal 1, OT)  -- new goal  -JJ           Living Environment    Home Accessibility  stairs to enter home  -JJ          User Key  (r) = Recorded By, (t) = Taken By, (c) = Cosigned By    Initials Name Effective Dates    Haleigh Callahan OTR 06/22/16 -          Occupational Therapy Education     Title: PT OT SLP Therapies (In Progress)     Topic: Occupational Therapy (In Progress)     Point: Body mechanics (Done)     Description: Instruct learner(s) on proper positioning and spine alignment during self-care, functional mobility activities and/or exercises.    Learning Progress Summary           Patient Acceptance, E,TB, VU by TERA at 9/19/2019  2:31 PM    Comment:  pt educated on benefits of activity, adls, and safety with functional transfers                               User Key     Initials Effective Dates Name Provider Type Discipline     06/22/16 -  Haleigh Alarcon, WALTERR Occupational Therapist OT                  OT Recommendation and Plan  Outcome Summary/Treatment Plan (OT)  Anticipated Equipment Needs at Discharge (OT): (pt may benefit from reacher)  Anticipated Discharge Disposition (OT): skilled nursing facility, home with home health  Planned Therapy Interventions (OT Eval): functional balance retraining, BADL retraining  Therapy Frequency (OT Eval): 3 times/wk  Plan of Care Review  Plan of Care Reviewed With: patient  Plan of Care Reviewed With: patient  Outcome Summary: OT evaluation completed. pt complains of pain 8/10 in R foot. States pain present since knee injection yesterday. pt  "requires CGA  for functional transfers from the recliner chair and BSC with RW. pt with complaints of pain throughout mobility and states her plan is to go to rehab because \" I cant take care of myself like this\". OT will see pt 3 x week while in hospital.     Outcome Measures     Row Name 09/19/19 0930 09/19/19 0846          How much help from another person do you currently need...    Turning from your back to your side while in flat bed without using bedrails?  --  2  -JW     Moving from lying on back to sitting on the side of a flat bed without bedrails?  --  2  -JW     Moving to and from a bed to a chair (including a wheelchair)?  --  2  -JW     Standing up from a chair using your arms (e.g., wheelchair, bedside chair)?  --  2  -JW     Climbing 3-5 steps with a railing?  --  1  -JW     To walk in hospital room?  --  2  -JW     AM-PAC 6 Clicks Score (PT)  --  11  -JW        How much help from another is currently needed...    Putting on and taking off regular lower body clothing?  3  -JJ  --     Bathing (including washing, rinsing, and drying)  3  -JJ  --     Toileting (which includes using toilet bed pan or urinal)  3  -JJ  --     Putting on and taking off regular upper body clothing  4  -JJ  --     Taking care of personal grooming (such as brushing teeth)  4  -JJ  --     Eating meals  4  -JJ  --     AM-PAC 6 Clicks Score (OT)  21  -JJ  --        Functional Assessment    Outcome Measure Options  AM-PAC 6 Clicks Daily Activity (OT)  -  AM-PAC 6 Clicks Basic Mobility (PT)  -       User Key  (r) = Recorded By, (t) = Taken By, (c) = Cosigned By    Initials Name Provider Type    Haleigh Callahan, OTR Occupational Therapist    Syl Mcgowan, CHET Physical Therapist          Time Calculation:   Time Calculation- OT     Row Name 09/19/19 1441             Time Calculation- OT    OT Start Time  0930  -      OT Stop Time  1000  -      OT Time Calculation (min)  30 min  -        User Key  (r) = Recorded " By, (t) = Taken By, (c) = Cosigned By    Initials Name Provider Type    Haleigh Callahan, OTNICK Occupational Therapist        Therapy Charges for Today     Code Description Service Date Service Provider Modifiers Qty    [id number]  OT EVAL LOW COMPLEXITY 2 9/19/2019 Haleigh Alarcon OTR GO 1               ROLA Hui  9/19/2019

## 2019-09-20 ENCOUNTER — APPOINTMENT (OUTPATIENT)
Dept: GENERAL RADIOLOGY | Facility: HOSPITAL | Age: 79
End: 2019-09-20

## 2019-09-20 VITALS
TEMPERATURE: 97.2 F | HEART RATE: 76 BPM | BODY MASS INDEX: 33.83 KG/M2 | WEIGHT: 203.04 LBS | SYSTOLIC BLOOD PRESSURE: 128 MMHG | RESPIRATION RATE: 20 BRPM | HEIGHT: 65 IN | OXYGEN SATURATION: 92 % | DIASTOLIC BLOOD PRESSURE: 52 MMHG

## 2019-09-20 LAB
ANION GAP SERPL CALCULATED.3IONS-SCNC: 9 MMOL/L (ref 5–15)
BACTERIA SPEC AEROBE CULT: ABNORMAL
BASOPHILS # BLD AUTO: 0.06 10*3/MM3 (ref 0–0.2)
BASOPHILS NFR BLD AUTO: 1.3 % (ref 0–1.5)
BUN BLD-MCNC: 28 MG/DL (ref 8–23)
BUN/CREAT SERPL: 15.1 (ref 7–25)
CALCIUM SPEC-SCNC: 9.4 MG/DL (ref 8.6–10.5)
CHLORIDE SERPL-SCNC: 97 MMOL/L (ref 98–107)
CO2 SERPL-SCNC: 30 MMOL/L (ref 22–29)
CREAT BLD-MCNC: 1.85 MG/DL (ref 0.57–1)
DEPRECATED RDW RBC AUTO: 81.7 FL (ref 37–54)
EOSINOPHIL # BLD AUTO: 0.17 10*3/MM3 (ref 0–0.4)
EOSINOPHIL NFR BLD AUTO: 3.6 % (ref 0.3–6.2)
ERYTHROCYTE [DISTWIDTH] IN BLOOD BY AUTOMATED COUNT: 21.2 % (ref 12.3–15.4)
GFR SERPL CREATININE-BSD FRML MDRD: 26 ML/MIN/1.73
GLUCOSE BLD-MCNC: 277 MG/DL (ref 65–99)
GLUCOSE BLDC GLUCOMTR-MCNC: 139 MG/DL (ref 70–130)
GLUCOSE BLDC GLUCOMTR-MCNC: 200 MG/DL (ref 70–130)
GLUCOSE BLDC GLUCOMTR-MCNC: 242 MG/DL (ref 70–130)
HCT VFR BLD AUTO: 24.7 % (ref 34–46.6)
HGB BLD-MCNC: 7.8 G/DL (ref 12–15.9)
IMM GRANULOCYTES # BLD AUTO: 0.03 10*3/MM3 (ref 0–0.05)
IMM GRANULOCYTES NFR BLD AUTO: 0.6 % (ref 0–0.5)
LYMPHOCYTES # BLD AUTO: 1.09 10*3/MM3 (ref 0.7–3.1)
LYMPHOCYTES NFR BLD AUTO: 23.2 % (ref 19.6–45.3)
MCH RBC QN AUTO: 34.2 PG (ref 26.6–33)
MCHC RBC AUTO-ENTMCNC: 31.6 G/DL (ref 31.5–35.7)
MCV RBC AUTO: 108.3 FL (ref 79–97)
MONOCYTES # BLD AUTO: 0.16 10*3/MM3 (ref 0.1–0.9)
MONOCYTES NFR BLD AUTO: 3.4 % (ref 5–12)
NEUTROPHILS # BLD AUTO: 3.18 10*3/MM3 (ref 1.7–7)
NEUTROPHILS NFR BLD AUTO: 67.9 % (ref 42.7–76)
NRBC BLD AUTO-RTO: 0 /100 WBC (ref 0–0.2)
PLATELET # BLD AUTO: 295 10*3/MM3 (ref 140–450)
PMV BLD AUTO: 11.9 FL (ref 6–12)
POTASSIUM BLD-SCNC: 4.6 MMOL/L (ref 3.5–5.2)
RBC # BLD AUTO: 2.28 10*6/MM3 (ref 3.77–5.28)
SODIUM BLD-SCNC: 136 MMOL/L (ref 136–145)
WBC NRBC COR # BLD: 4.69 10*3/MM3 (ref 3.4–10.8)

## 2019-09-20 PROCEDURE — 25010000002 EPOETIN ALFA PER 1000 UNITS: Performed by: NURSE PRACTITIONER

## 2019-09-20 PROCEDURE — 73610 X-RAY EXAM OF ANKLE: CPT

## 2019-09-20 PROCEDURE — 63710000001 INSULIN ASPART PER 5 UNITS: Performed by: NURSE PRACTITIONER

## 2019-09-20 PROCEDURE — 82962 GLUCOSE BLOOD TEST: CPT

## 2019-09-20 PROCEDURE — G0378 HOSPITAL OBSERVATION PER HR: HCPCS

## 2019-09-20 PROCEDURE — 85025 COMPLETE CBC W/AUTO DIFF WBC: CPT | Performed by: NURSE PRACTITIONER

## 2019-09-20 PROCEDURE — 73630 X-RAY EXAM OF FOOT: CPT

## 2019-09-20 PROCEDURE — 97530 THERAPEUTIC ACTIVITIES: CPT

## 2019-09-20 PROCEDURE — 63710000001 DIPHENHYDRAMINE PER 50 MG: Performed by: NURSE PRACTITIONER

## 2019-09-20 PROCEDURE — 99217 PR OBSERVATION CARE DISCHARGE MANAGEMENT: CPT | Performed by: NURSE PRACTITIONER

## 2019-09-20 PROCEDURE — 63710000001 INSULIN ASPART PER 5 UNITS: Performed by: HOSPITALIST

## 2019-09-20 PROCEDURE — 97110 THERAPEUTIC EXERCISES: CPT

## 2019-09-20 PROCEDURE — 94799 UNLISTED PULMONARY SVC/PX: CPT

## 2019-09-20 PROCEDURE — 80048 BASIC METABOLIC PNL TOTAL CA: CPT | Performed by: NURSE PRACTITIONER

## 2019-09-20 PROCEDURE — 96372 THER/PROPH/DIAG INJ SC/IM: CPT

## 2019-09-20 RX ORDER — HYDROCODONE BITARTRATE AND ACETAMINOPHEN 7.5; 325 MG/1; MG/1
1 TABLET ORAL EVERY 4 HOURS PRN
Qty: 12 TABLET | Refills: 0 | Status: SHIPPED | OUTPATIENT
Start: 2019-09-20 | End: 2019-09-22

## 2019-09-20 RX ORDER — MELATONIN
2000 DAILY
Status: DISCONTINUED | OUTPATIENT
Start: 2019-09-20 | End: 2019-09-20 | Stop reason: HOSPADM

## 2019-09-20 RX ORDER — L.ACID,PARA/B.BIFIDUM/S.THERM 8B CELL
1 CAPSULE ORAL DAILY
Status: DISCONTINUED | OUTPATIENT
Start: 2019-09-20 | End: 2019-09-20 | Stop reason: HOSPADM

## 2019-09-20 RX ORDER — DOCUSATE SODIUM 100 MG/1
100 CAPSULE, LIQUID FILLED ORAL 2 TIMES DAILY
Status: DISCONTINUED | OUTPATIENT
Start: 2019-09-20 | End: 2019-09-20 | Stop reason: HOSPADM

## 2019-09-20 RX ORDER — DESVENLAFAXINE SUCCINATE 50 MG/1
50 TABLET, EXTENDED RELEASE ORAL DAILY
Start: 2019-09-21 | End: 2019-11-21 | Stop reason: SDUPTHER

## 2019-09-20 RX ORDER — L.ACID,PARA/B.BIFIDUM/S.THERM 8B CELL
1 CAPSULE ORAL DAILY
Start: 2019-09-21 | End: 2020-04-09

## 2019-09-20 RX ORDER — CEFUROXIME AXETIL 250 MG/1
250 TABLET ORAL EVERY 12 HOURS SCHEDULED
Status: DISCONTINUED | OUTPATIENT
Start: 2019-09-20 | End: 2019-09-20 | Stop reason: HOSPADM

## 2019-09-20 RX ORDER — LEVOTHYROXINE SODIUM 112 UG/1
112 TABLET ORAL EVERY MORNING
Status: DISCONTINUED | OUTPATIENT
Start: 2019-09-21 | End: 2019-09-20 | Stop reason: HOSPADM

## 2019-09-20 RX ORDER — GABAPENTIN 400 MG/1
400 CAPSULE ORAL EVERY 8 HOURS SCHEDULED
Qty: 6 CAPSULE | Refills: 0 | Status: SHIPPED | OUTPATIENT
Start: 2019-09-20 | End: 2020-11-11 | Stop reason: ALTCHOICE

## 2019-09-20 RX ORDER — LEVOTHYROXINE SODIUM 112 UG/1
112 TABLET ORAL EVERY MORNING
Start: 2019-09-21 | End: 2019-12-17

## 2019-09-20 RX ORDER — CEFUROXIME AXETIL 250 MG/1
250 TABLET ORAL EVERY 12 HOURS SCHEDULED
Qty: 13 TABLET | Refills: 0
Start: 2019-09-20 | End: 2019-09-27

## 2019-09-20 RX ORDER — DOCUSATE SODIUM 100 MG/1
100 CAPSULE, LIQUID FILLED ORAL 2 TIMES DAILY
Start: 2019-09-20 | End: 2020-04-09

## 2019-09-20 RX ORDER — CHOLECALCIFEROL (VITAMIN D3) 125 MCG
5 CAPSULE ORAL NIGHTLY PRN
Start: 2019-09-20 | End: 2020-01-29

## 2019-09-20 RX ADMIN — INSULIN ASPART 15 UNITS: 100 INJECTION, SOLUTION INTRAVENOUS; SUBCUTANEOUS at 08:03

## 2019-09-20 RX ADMIN — Medication 1 CAPSULE: at 11:24

## 2019-09-20 RX ADMIN — NYSTATIN: 100000 POWDER TOPICAL at 16:41

## 2019-09-20 RX ADMIN — ANTACID TABLETS 2 TABLET: 500 TABLET, CHEWABLE ORAL at 08:00

## 2019-09-20 RX ADMIN — SULFAMETHOXAZOLE AND TRIMETHOPRIM 160 MG: 800; 160 TABLET ORAL at 06:17

## 2019-09-20 RX ADMIN — LEVOTHYROXINE SODIUM 112 MCG: 112 TABLET ORAL at 06:17

## 2019-09-20 RX ADMIN — DOCUSATE SODIUM 100 MG: 100 CAPSULE, LIQUID FILLED ORAL at 13:04

## 2019-09-20 RX ADMIN — NYSTATIN: 100000 POWDER TOPICAL at 06:19

## 2019-09-20 RX ADMIN — DIPHENHYDRAMINE HYDROCHLORIDE 25 MG: 25 CAPSULE ORAL at 16:40

## 2019-09-20 RX ADMIN — INSULIN ASPART 3 UNITS: 100 INJECTION, SOLUTION INTRAVENOUS; SUBCUTANEOUS at 08:01

## 2019-09-20 RX ADMIN — Medication 300 ML: at 13:59

## 2019-09-20 RX ADMIN — CYCLOBENZAPRINE HYDROCHLORIDE 10 MG: 10 TABLET, FILM COATED ORAL at 08:01

## 2019-09-20 RX ADMIN — POLYETHYLENE GLYCOL 3350 17 G: 17 POWDER, FOR SOLUTION ORAL at 13:04

## 2019-09-20 RX ADMIN — MIDODRINE HYDROCHLORIDE 2.5 MG: 5 TABLET ORAL at 08:01

## 2019-09-20 RX ADMIN — INSULIN ASPART 15 UNITS: 100 INJECTION, SOLUTION INTRAVENOUS; SUBCUTANEOUS at 12:22

## 2019-09-20 RX ADMIN — FUROSEMIDE 20 MG: 20 TABLET ORAL at 08:01

## 2019-09-20 RX ADMIN — DESVENLAFAXINE 50 MG: 50 TABLET, EXTENDED RELEASE ORAL at 08:00

## 2019-09-20 RX ADMIN — HYDROCODONE BITARTRATE AND ACETAMINOPHEN 1 TABLET: 7.5; 325 TABLET ORAL at 13:56

## 2019-09-20 RX ADMIN — GABAPENTIN 400 MG: 400 CAPSULE ORAL at 13:56

## 2019-09-20 RX ADMIN — FLUTICASONE PROPIONATE 2 SPRAY: 50 SPRAY, METERED NASAL at 08:03

## 2019-09-20 RX ADMIN — SODIUM CHLORIDE, PRESERVATIVE FREE 10 ML: 5 INJECTION INTRAVENOUS at 08:04

## 2019-09-20 RX ADMIN — POTASSIUM CHLORIDE 10 MEQ: 10 TABLET, EXTENDED RELEASE ORAL at 08:04

## 2019-09-20 RX ADMIN — HYDROCODONE BITARTRATE AND ACETAMINOPHEN 1 TABLET: 7.5; 325 TABLET ORAL at 06:17

## 2019-09-20 RX ADMIN — ERYTHROPOIETIN 60000 UNITS: 40000 INJECTION, SOLUTION INTRAVENOUS; SUBCUTANEOUS at 13:04

## 2019-09-20 RX ADMIN — CEFUROXIME AXETIL 250 MG: 250 TABLET, FILM COATED ORAL at 11:25

## 2019-09-20 RX ADMIN — CYCLOBENZAPRINE HYDROCHLORIDE 10 MG: 10 TABLET, FILM COATED ORAL at 16:40

## 2019-09-20 RX ADMIN — GABAPENTIN 400 MG: 400 CAPSULE ORAL at 06:18

## 2019-09-20 RX ADMIN — DIPHENHYDRAMINE HYDROCHLORIDE 25 MG: 25 CAPSULE ORAL at 08:01

## 2019-09-20 RX ADMIN — INSULIN ASPART 4 UNITS: 100 INJECTION, SOLUTION INTRAVENOUS; SUBCUTANEOUS at 12:21

## 2019-09-20 RX ADMIN — VITAMIN D, TAB 1000IU (100/BT) 2000 UNITS: 25 TAB at 12:21

## 2019-09-20 RX ADMIN — PANTOPRAZOLE SODIUM 40 MG: 40 TABLET, DELAYED RELEASE ORAL at 08:00

## 2019-09-20 RX ADMIN — Medication 1 TABLET: at 08:00

## 2019-09-20 NOTE — PLAN OF CARE
Problem: Patient Care Overview  Goal: Plan of Care Review  Outcome: Ongoing (interventions implemented as appropriate)   09/20/19 1133   Coping/Psychosocial   Plan of Care Reviewed With patient   OTHER   Outcome Summary PT: Patient performs supine to sit with supervision and sit to/from stand with CGA. Patient performs gait with rolling walker x6 feet with CGA and use of immobilizer. Patient refuses further gait distance due to reported knee pain. Patient requires encouragement for progressive activity during session.

## 2019-09-20 NOTE — PROGRESS NOTES
"SERVICE: [facility] HOSPITALIST    CONSULTANTS: orthopedic surgery    CHIEF COMPLAINT: f/u right meniscus tear    SUBJECTIVE: The patient reports she is now having right foot and ankle pain, severe since right knee injection 2 days ago.  She notes continued improvement with participation with PT/OT although slight.  She reports no bowel movement in several days.  She also requests her oxygen at night and notes she has not been on this since admission. She otherwise denies f/c/cough/soa/chest pain/n/v/d/abdominal pain or other new concerns.    OBJECTIVE:    /66 (BP Location: Right arm, Patient Position: Sitting)   Pulse 72   Temp 98 °F (36.7 °C) (Axillary)   Resp 20   Ht 165 cm (64.96\")   Wt 92.1 kg (203 lb 0.7 oz)   SpO2 92%   BMI 33.83 kg/m²     MEDS/LABS REVIEWED AND ORDERED    0.9% sodium chloride for irrigation-mineral oil-glycerin 300 mL Rectal Once   atorvastatin 10 mg Oral Nightly   calcium carbonate 2 tablet Oral Daily   cefuroxime 250 mg Oral Q12H   cholecalciferol 2,000 Units Oral Daily   cyclobenzaprine 10 mg Oral TID   desvenlafaxine 50 mg Oral Daily   diphenhydrAMINE 25 mg Oral TID   diphenhydrAMINE 50 mg Oral Nightly   docusate sodium 100 mg Oral BID   enoxaparin 40 mg Subcutaneous Q24H   epoetin chu 60,000 Units Subcutaneous Weekly   fluticasone 2 spray Nasal Daily   furosemide 20 mg Oral Daily   gabapentin 400 mg Oral Q8H   insulin aspart 0-9 Units Subcutaneous 4x Daily AC & at Bedtime   insulin aspart 15 Units Subcutaneous TID With Meals   insulin detemir 35 Units Subcutaneous Nightly   lactobacillus acidophilus 1 capsule Oral Daily   [START ON 9/21/2019] levothyroxine 112 mcg Oral QAM   midodrine 2.5 mg Oral TID   multivitamin with minerals 1 tablet Oral Daily   nystatin  Topical Q8H   pantoprazole 40 mg Oral QAM   polyethylene glycol 17 g Oral Daily   potassium chloride 10 mEq Oral Daily   sodium chloride 10 mL Intravenous Q12H     Physical Exam   Constitutional: " She is oriented to person, place, and time. She appears well-developed and well-nourished.   Obese   HENT:   Head: Normocephalic and atraumatic.   Right facial droop   Eyes: EOM are normal. Pupils are equal, round, and reactive to light.   Cardiovascular: Normal rate and regular rhythm.   Pulmonary/Chest: Effort normal.   Diminished bilateral bases   Abdominal: Soft. Bowel sounds are normal. She exhibits no distension. There is no tenderness. There is no guarding.   Musculoskeletal:   1+ bilateral ankle edema  Right medial and lateral ankle area is tender to touch as well as top of foot tenderness, unable to flex right foot   Neurological: She is alert and oriented to person, place, and time.   Skin: Skin is warm and dry. No erythema.   Psychiatric: She has a normal mood and affect. Her behavior is normal.   Vitals reviewed.    LAB/DIAGNOSTICS:    Lab Results (last 24 hours)     Procedure Component Value Units Date/Time    POC Glucose Once [[id number]]  (Abnormal) Collected:  09/20/19 1147    Specimen:  Blood Updated:  09/20/19 1204     Glucose 200 mg/dL     POC Glucose Once [[id number]]  (Abnormal) Collected:  09/20/19 0720    Specimen:  Blood Updated:  09/20/19 0736     Glucose 242 mg/dL     Basic Metabolic Panel [[id number]]  (Abnormal) Collected:  09/20/19 0433    Specimen:  Blood Updated:  09/20/19 0523     Glucose 277 mg/dL      BUN 28 mg/dL      Creatinine 1.85 mg/dL      Sodium 136 mmol/L      Potassium 4.6 mmol/L      Chloride 97 mmol/L      CO2 30.0 mmol/L      Calcium 9.4 mg/dL      eGFR Non African Amer 26 mL/min/1.73      BUN/Creatinine Ratio 15.1     Anion Gap 9.0 mmol/L     Narrative:       GFR Normal >60  Chronic Kidney Disease <60  Kidney Failure <15    CBC & Differential [[id number]] Collected:  09/20/19 0433    Specimen:  Blood Updated:  09/20/19 0506    Narrative:       The following orders were created for panel order CBC & Differential.  Procedure                               Abnormality          Status                     ---------                               -----------         ------                     CBC Auto Differential[[id number]]        Abnormal            Final result                 Please view results for these tests on the individual orders.    CBC Auto Differential [[id number]]  (Abnormal) Collected:  09/20/19 0433    Specimen:  Blood Updated:  09/20/19 0506     WBC 4.69 10*3/mm3      RBC 2.28 10*6/mm3      Hemoglobin 7.8 g/dL      Hematocrit 24.7 %      .3 fL      MCH 34.2 pg      MCHC 31.6 g/dL      RDW 21.2 %      RDW-SD 81.7 fl      MPV 11.9 fL      Platelets 295 10*3/mm3      Neutrophil % 67.9 %      Lymphocyte % 23.2 %      Monocyte % 3.4 %      Eosinophil % 3.6 %      Basophil % 1.3 %      Immature Grans % 0.6 %      Neutrophils, Absolute 3.18 10*3/mm3      Lymphocytes, Absolute 1.09 10*3/mm3      Monocytes, Absolute 0.16 10*3/mm3      Eosinophils, Absolute 0.17 10*3/mm3      Basophils, Absolute 0.06 10*3/mm3      Immature Grans, Absolute 0.03 10*3/mm3      nRBC 0.0 /100 WBC     Urine Culture - Urine, Urine, Catheter In/Out [[id number]]  (Abnormal)  (Susceptibility) Collected:  09/18/19 1022    Specimen:  Urine, Catheter In/Out Updated:  09/20/19 0313     Urine Culture >100,000 CFU/mL Klebsiella pneumoniae ssp pneumoniae    Susceptibility      Klebsiella pneumoniae ssp pneumoniae     NEEMA     Ampicillin Resistant     Ampicillin + Sulbactam Susceptible     Cefazolin Susceptible     Cefepime Susceptible     Ceftazidime Susceptible     Ceftriaxone Susceptible     Gentamicin Susceptible     Levofloxacin Susceptible     Nitrofurantoin Intermediate     Piperacillin + Tazobactam Susceptible     Tetracycline Susceptible     Trimethoprim + Sulfamethoxazole Susceptible                    POC Glucose Once [[id number]]  (Abnormal) Collected:  09/19/19 2018    Specimen:  Blood Updated:  09/19/19 2025     Glucose 372 mg/dL     POC Glucose Once [[id number]]  (Abnormal) Collected:  09/19/19 1639     Specimen:  Blood Updated:  09/19/19 1649     Glucose 335 mg/dL         ECG 12 Lead   Final Result   HEART RATE= 80  bpm   RR Interval= 752  ms   NH Interval= 172  ms   P Horizontal Axis= 8  deg   P Front Axis= 68  deg   QRSD Interval= 147  ms   QT Interval= 404  ms   QRS Axis= -59  deg   T Wave Axis= 15  deg   - ABNORMAL ECG -   Sinus rhythm   RBBB and LAFB   NO SIGNIFICANT CHANGE FROM PREVIOUS ECG   Electronically Signed By: Kehinde Sanon ([facility]) 18-Sep-2019 16:20:16   Date and Time of Study: 2019-09-18 13:57:51        Results for orders placed during the hospital encounter of 10/04/18   Adult Transthoracic Echo Complete W/ Cont if Necessary Per Protocol    Narrative · Left ventricular systolic function is normal.  · Calculated right ventricular systolic pressure from tricuspid   regurgitation is 16 mmHg.  · Left ventricular diastolic dysfunction (grade I) consistent with   impaired relaxation.  · Calculated EF = 67%.        Xr Ankle 3+ View Right    Result Date: 9/20/2019  Soft tissue swelling. Right ankle series is otherwise negative.  This report was finalized on 9/20/2019 1:33 PM by Dr. Jacques Bates MD.      Xr Foot 3+ View Right    Result Date: 9/20/2019  Soft tissue swelling. Right foot series is otherwise negative.  This report was finalized on 9/20/2019 1:34 PM by Dr. Jacques Bates MD.        ASSESSMENT/PLAN:  Acute right medial meniscus tear with intractable pain: Orthopedic surgery followed with injection given 9/18/2019  Poor participation with PT/OT patient requests rehab, awaiting pre-CERT    Acute klebsiella pneumoniae UTI with h/o recurrent UTIs:  Chronic urinary incontinence and frequency:  Add Ceftin 250 mg twice daily x7 days with probiotic pending C&S    DM2 with hyperglycemia in obese with diabetic peripheral neuropathy: A1c 8.0%  Glucose elevated 200s/300s  Increase Levemir to 35 units nightly  Increase NovoLog to 15 units with each meal  Increase sliding scale insulin to moderate  "dose  Continue Accu-Cheks AC/at bedtime   Continue gabapentin 400 mg every 8 hours, Flexeril 10 mg 3 times daily  Monitor    Hypothyroidism: TSH elevated at this admission, adjusted levothyroxine up to 112 mcg daily  F/U Chari Mercado MD for repeat TFTs in 6 to 8 weeks    CKD stage III/IV:  Acute on chronic macrocytic anemia:  MGUS/MDS with 5q deletion:  Creatinine 1.85 today (baseline 1.8-1.9)  Hemoglobin 7.8 without active blood loss noted  Give Procrit 60,000 units now then weekly  Recheck lab in a.m., may need blood transfusion, currently asymptomatic  monitor    Vitamin D insufficiency: Start daily supplement    Hypertension with chronic orthostatic hypotension/chronic dizziness:  CAD, Chronic dCHF:  Continue midodrine 2.5 mg 3 times daily and Lasix 20 mg daily  Orthostatic vital signs daily    Chronic nocturnal hypoxia/AARON with noncompliance with CPAP: Wears 2 L with sleep    GERD: Diabetic gastroparesis:  No current acute issues on Protonix 40 mg daily, follows with Dr. Hernandez outpatient    Chronic pruritus: Continue home Benadryl 3 times daily and 50 mg nightly    Anxiety/depression: No current acute issues on Pristiq 50 mg daily    Hyperlipidemia: No current acute issues on Lipitor 10 mg nightly    H/O Bell's palsy and TIA 2017:    H0 DVT LLE 8 years ago, no current acute issues on prophylaxis with Lovenox    PLAN FOR DISPOSITION: Rehab when able    REY Samuel  Hospitalist, [facility]  09/20/19  10:21 AM    \"Dictated utilizing Dragon dictation\"    "

## 2019-09-20 NOTE — NURSING NOTE
Continued Stay Note  VICKY Sharma     Patient Name: Joya Singh  MRN: [id number]  Today's Date: 9/20/2019    Admit Date: 9/16/2019    Discharge Plan     Row Name 09/20/19 1445       Plan    Plan Comments  Phone call from Pharmacy discussing billing of procrit administration.   Call placed to Agnes claire who gave approval to administer procrit under obs status.  Frances LE notified for orders for Procrit.  Notification from Sarah at [facility] that precert is complete and pt is approved to go to [facility].  Sheila OCONNELL notified of D/C readiness.          Discharge Codes    No documentation.             Domenic Stratton RN

## 2019-09-20 NOTE — DISCHARGE SUMMARY
"Joya Singh  1940  [id number]    Hospitalists Discharge Summary    Date of Admission: 9/16/2019  Date of Discharge:  9/20/2019    History of Present Illness: Taken from [facility] on admit:     'This is a 79-year-old female who presented to the emergency room with right knee pain.  She has been falling a lot because she is unsteady on her feet and weak.  She tells the ER she was walking and felt a pop in the back of her knee which caused her to fall backwards into her chair.  Now the pain is so severe that she cannot stand or walk.  X-rays were done in ER and they were negative for fracture.  We have placed the patient in observation and will get an orthopedic consult and a physical therapy and Occupational Therapy consult tomorrow.  She has her own walker and a wheelchair at home.  She already is on Norco at home as needed for pain.  She complains of arthralgias and joint swelling.  She is not having nausea or vomiting or diarrhea.  She does have recurrent dysuria and incontinence.  She is not having chest pain cough or fever.\"  Hospital Course Summary/Primary Discharge Diagnoses:  *taken from note of earlier today, unchanged:  Acute right medial meniscus tear with intractable pain: Orthopedic surgery followed with injection given 9/18/2019  Poor participation with PT/OT   Going to [facility] for continued rehab  F/U Dr. Wylie 3 weeks  Secondary Discharge Diagnoses:   Acute klebsiella pneumoniae UTI with h/o recurrent UTIs:  Chronic urinary incontinence and frequency:  Continue Ceftin 250 mg twice daily x7 days with probiotic pending C&S, will need f/u at SNF     DM2 with hyperglycemia in obese with diabetic peripheral neuropathy: A1c 8.0%  Glucose elevated 200s/300s  Insulins adjusted today  Resume home tresiba,   Continue adjusted NovoLog 15 units with each meal, moderate dose SSI  Continue Accu-Cheks AC/at bedtime   Continue gabapentin 400 mg every 8 hours, Flexeril 10 mg 3 times daily  Monitor     Hypothyroidism: " TSH elevated at this admission, adjusted levothyroxine up to 112 mcg daily  F/U Chari Mercado MD for repeat TFTs in 6 to 8 weeks     CKD stage III/IV:  Acute on chronic macrocytic anemia:  MGUS/MDS with 5q deletion:  Creatinine 1.85 today (baseline 1.8-1.9)  Hemoglobin 7.8 without active blood loss noted  Given Procrit 60,000 units today, gets weekly  Monitor      Vitamin D insufficiency: Started daily supplement this admit     Hypertension with chronic orthostatic hypotension/chronic dizziness:  CAD, Chronic dCHF:  Continue midodrine 2.5 mg 3 times daily and Lasix 20 mg daily  Continue orthostatic vital signs daily     Chronic nocturnal hypoxia/AARON with noncompliance with CPAP: Wears 2 L with sleep     GERD: Diabetic gastroparesis:  No current acute issues on Protonix 40 mg daily, follows with Dr. Hernandez outpatient     Chronic pruritus: Continue home Benadryl 3 times daily and 50 mg nightly     Anxiety/depression: No current acute issues on Pristiq 50 mg daily     Hyperlipidemia: No current acute issues on Lipitor 10 mg nightly     H/O Bell's palsy and TIA 2017:     H0 DVT LLE 8 years ago, no current acute issues on prophylaxis with Lovenox while here    PCP  Patient Care Team:  Chari Mercado MD as PCP - General  Chari Mercado MD as PCP - Family Medicine  Christos Rob MD as Surgeon (General Surgery)  German Wylie MD as Surgeon (Orthopedic Surgery)  Yasmani Baugh MD as Consulting Physician (Nephrology)  Yasmani Baugh MD as Referring Physician (Nephrology)  Elieser Headley MD as Consulting Physician (Hematology and Oncology)    Consults:   Consults     Date and Time Order Name Status Description    9/16/2019 1716 Inpatient Orthopedic Surgery Consult Completed         Operations and Procedures Performed:     Xr Knee 1 Or 2 View Right    Result Date: 9/16/2019  Narrative: XR KNEE 1 OR 2 VW RIGHT-: 9/16/2019 2:07 PM  INDICATION: Right knee pain since  yesterday.  COMPARISON: None available.  FINDINGS: 2 view(s) of the right knee.  No fracture, dislocation, or effusion. No bone erosion or destruction.  No foreign body.      Impression: Negative right knee.  This report was finalized on 9/16/2019 2:17 PM by Dr. Henry Mendiola MD.      Xr Tibia Fibula 2 View Right    Result Date: 9/16/2019  Narrative: XR TIBIA FIBULA 2 VW RIGHT-: 9/16/2019 2:13 PM  INDICATION: Fall this morning with right lower leg pain.  COMPARISON: None available.  FINDINGS: 2 views of the right tibia/fibula.  No fracture or dislocation.  No bone erosion or destruction. Articulation at the knee and ankle is anatomic..  No foreign body. There is some lateral soft tissue swelling suggested at the ankle      Impression: There may be some lateral soft tissue swelling of the ankle. Otherwise study is normal.  This report was finalized on 9/16/2019 2:18 PM by Dr. Henry Mendiola MD.      Xr Ankle 3+ View Right    Result Date: 9/20/2019  Narrative: RIGHT ANKLE, 09/20/2019     HISTORY: 79-year-old female with right knee injury after a fall 4 days ago. She also complains of right foot and ankle pain and swelling since the injury.  TECHNIQUE: Three-view right ankle series.  FINDINGS: No fracture, dislocation, arthropathy or other osseous abnormality is demonstrated. Soft tissue swelling surrounds the ankle, and soft tissue edema is visible in the lower leg.      Impression: Soft tissue swelling. Right ankle series is otherwise negative.  This report was finalized on 9/20/2019 1:33 PM by Dr. Jacques Bates MD.      Xr Foot 3+ View Right    Result Date: 9/20/2019  Narrative: RIGHT FOOT, 09/20/2019     HISTORY: 79-year-old female with right knee injury after a fall 4 days ago. She also complains of right foot and ankle pain and swelling since the injury.  TECHNIQUE: Three-view right foot series.  FINDINGS: No fracture, dislocation or other acute osseous abnormality is demonstrated. Soft tissue swelling is visible  throughout the foot.      Impression: Soft tissue swelling. Right foot series is otherwise negative.  This report was finalized on 9/20/2019 1:34 PM by Dr. Jacques Bates MD.      Mri Knee Right Without Contrast    Result Date: 9/17/2019  Narrative: MRI Knee RT WO INDICATION:  Right knee pain and swelling for the past 2 days TECHNIQUE: MRI of the  right knee without contrast. COMPARISON: None. FINDINGS: There is a moderately large joint effusion. Cartilage thinning is noted to a mild degree along the patellar undersurface. There is mild to moderate cartilage thinning in the medial and lateral compartments with no osteochondral defects or fragments. In the lateral meniscus there is generalized intrameniscal degenerative signal change with no displaced meniscal tears. In the medial meniscus there is an oblique tear in the posterior horn with a small radial component in the far posterior horn. No bucket handle fragments are seen. The cruciate ligaments and collateral ligaments are intact. The quadriceps tendon and patellar tendon have normal signal. No marrow edema. Generalized soft tissue swelling is seen around the knee without any discrete fluid collections or masses identified.     Impression: Complex tear posterior horn of the medial meniscus. Lateral meniscal degeneration. Mild to moderate cartilage thinning in all 3 compartments with no focal osteochondral defects. These changes are accompanied by moderately large joint effusion and generalized soft tissue swelling around the knee. Signer Name: Casey Quiles MD  Signed: 9/17/2019 7:27 PM  Workstation Name: [id number]  [facility]    Dexa Bone Density Axial    Result Date: 9/11/2019  Narrative: DXA BONE MINERAL DENSITY MEASUREMENT, 09/11/2019  INDICATION: 79-year-old  female. Postmenopausal. Hysterectomy. Hypothyroidism. Takes multivitamins, vitamin D and calcium supplements. Osteoporosis screening.  TECHNIQUE: DXA bone  mineral density measurements were obtained at the lumbar spine and left hip.  FINDINGS: At the left forearm, 1/3 T score measures -1.8. This indicates osteopenia (T score between -1.0 and -2.5). Bone mineral density has decreased 7.8 % since the previous study of 09/27/2011.  At the left hip, lowest left femoral neck T score measures -2.1. This indicates osteopenia T score between -1.0 and -2.5). Bone mineral density has decreased 12.1 % since the previous study of 09/27/2011.      Impression: 1. Osteopenia. 2. Left forearm (1/3) T-score measures -1.8 3. Left femoral neck T-score measures -2.1 4. Statistically significant WORSENING in the BMD of the left forearm by 7.8% and left hip by 12.1% when compared to the baseline study performed 09/27/2011.  FRAX? tool* Calculation of 10-year Fracture Risk  Major Osteoporotic Fracture 13% Hip Fracture 3.6%  *The FRAX? tool has been developed by WHO to evaluate fracture risk of patients. It is based on individual patient models that integrate the risks associated with clinical risk factors as well as bone mineral density (BMD) at the femoral neck.  The FRAX? algorithms give the 10-year probability of fracture. The output is a 10-year probability of hip fracture and the 10-year probability of a major osteoporotic fracture (clinical spine, forearm, hip or shoulder fracture).  Patients with a bone densitometry diagnosis of osteoporosis benefit from the use of osteoporosis medications. The FRAX? tool is helpful in determining which patients with a diagnosis of osteopenia or low bone mass would benefit from the use of osteoporosis medications. Patients with FRAX score 3% or higher at the hip or 20% or higher at other sites should be considered for medical therapy.  This report was finalized on 9/11/2019 2:34 PM by Dr. Navarro Smalls MD.      Mammo Screening Digital Tomosynthesis Bilateral With Cad    Result Date: 9/11/2019  Narrative: EXAM, 09/11/2019: 1. Bilateral digital  screening mammogram with CAD. 2. Bilateral digital screening breast tomosynthesis.  INDICATION: Routine screening. Negative family history.  TECHNIQUE: Bilateral digital screening mammogram images were obtained including digital breast tomosynthesis and CAD review.  COMPARISON: *  Screening mammogram, 11/28/2012, 11/14/2012, 10/25/2011 and 09/27/2011  FINDINGS: There are scattered areas of fibroglandular density. In the upper outer hemisphere right breast at middle third depth there is a new 5 mm oval nodular density, warranting further evaluation. Suggest further characterization with targeted ultrasound. Nodularity in the lower inner hemisphere left breast at middle third depth has been present and visible to varying degrees dating back to 2011 and is therefore benign.      Impression: New 5 mm oval-shaped nodule in the right breast. Suggest further evaluation with targeted ultrasound.  BI-RADS CATEGORY 0: Needs additional evaluation.   Women over the age of 40 undergoing screening mammography are entered into a reminder system with target due date for the next mammogram.  This report was finalized on 9/11/2019 9:01 AM by Dr. Navarro Smalls MD.      Allergies:  is allergic to baclofen; eliquis [apixaban]; codeine; lisinopril; morphine; and penicillins.    Chpi  Gabapentin 8/2019 per report of 9/16/19    Discharge Medications:     Discharge Medications      New Medications      Instructions Start Date   cefuroxime 250 MG tablet  Commonly known as:  CEFTIN   250 mg, Oral, Every 12 Hours Scheduled      Cholecalciferol 2000 units tablet   2,000 Units, Oral, Daily   Start Date:  9/21/2019     desvenlafaxine 50 MG 24 hr tablet  Commonly known as:  PRISTIQ   50 mg, Oral, Daily   Start Date:  9/21/2019     docusate sodium 100 MG capsule  Commonly known as:  COLACE   100 mg, Oral, 2 Times Daily      epoetin chu 79988 UNIT/ML solution 20,000 Units, epoetin chu 93650 UNIT/ML solution 40,000 Units   60,000 Units,  Subcutaneous, Weekly   Start Date:  9/27/2019     HYDROcodone-acetaminophen 7.5-325 MG per tablet  Commonly known as:  NORCO  Replaces:  HYDROcodone-acetaminophen 5-325 MG per tablet   1 tablet, Oral, Every 4 Hours PRN      lactobacillus acidophilus capsule capsule   1 capsule, Oral, Daily   Start Date:  9/21/2019     melatonin 5 MG tablet tablet   5 mg, Oral, Nightly PRN      polyethylene glycol pack packet  Commonly known as:  MIRALAX   17 g, Oral, Daily   Start Date:  9/21/2019        Changes to Medications      Instructions Start Date   gabapentin 400 MG capsule  Commonly known as:  NEURONTIN  What changed:    · how much to take  · how to take this  · when to take this  · additional instructions   400 mg, Oral, Every 8 Hours Scheduled      insulin aspart 100 UNIT/ML injection  Commonly known as:  novoLOG  What changed:    · when to take this  · additional instructions   15 Units, Subcutaneous, 3 Times Daily With Meals      insulin aspart 100 UNIT/ML injection  Commonly known as:  novoLOG  What changed:  You were already taking a medication with the same name, and this prescription was added. Make sure you understand how and when to take each.   0-9 Units, Subcutaneous, 4 Times Daily Before Meals & Nightly      levothyroxine 112 MCG tablet  Commonly known as:  SYNTHROID, LEVOTHROID  What changed:    · medication strength  · how much to take  · when to take this   112 mcg, Oral, Every Morning   Start Date:  9/21/2019        Continue These Medications      Instructions Start Date   ACCU-CHEK FASTCLIX LANCETS misc   TEST 3-4 TIMES DAILY AS DIRECTED      ACCU-CHEK KENNETH SMARTVIEW w/Device kit   TEST blood sugar three times daily or as directed      ACCU-CHEK SMARTVIEW test strip  Generic drug:  glucose blood   TEST BLOOD SUGAR THREE TIMES DAILY OR AS DIRECTED      acetaminophen 325 MG tablet  Commonly known as:  TYLENOL   650 mg, Oral, Every 6 Hours PRN, OTC product      albuterol sulfate  (90 Base) MCG/ACT  "inhaler  Commonly known as:  PROVENTIL HFA;VENTOLIN HFA;PROAIR HFA   2 puffs, Inhalation, Every 4 Hours PRN      atorvastatin 10 MG tablet  Commonly known as:  LIPITOR   TAKE ONE TABLET BY MOUTH AT BEDTIME      B-D SINGLE USE SWABS REGULAR pads   Does not apply      benzonatate 200 MG capsule  Commonly known as:  TESSALON   TAKE ONE CAPSULE BY MOUTH THREE TIMES DAILY AS NEEDED FOR COUGH      CALCIUM 1200 4749-2349 MG-UNIT chewable tablet   1 tablet, Oral, Daily      CENTRUM SILVER PO   Oral, Daily      cyclobenzaprine 10 MG tablet  Commonly known as:  FLEXERIL   TAKE ONE TABLET BY MOUTH TWICE DAILY as needed for muscle spasms      diphenhydrAMINE 25 mg capsule  Commonly known as:  BENADRYL   25 mg, Oral, Every 6 Hours PRN      fluticasone 50 MCG/ACT nasal spray  Commonly known as:  FLONASE   2 sprays, Nasal, Daily      furosemide 20 MG tablet  Commonly known as:  LASIX   TAKE ONE (1) TABLET ORALLY (BY MOUTH) ONCE DAILY      Insulin Degludec 200 UNIT/ML solution pen-injector pen injection  Commonly known as:  TRESIBA FLEXTOUCH   54 Units, Subcutaneous, Every Night at Bedtime      midodrine 2.5 MG tablet  Commonly known as:  PROAMATINE   2.5 mg, Oral, 3 Times Daily      Needle (Disp) 30G X 1/2\" misc  Commonly known as:  BD DISP NEEDLES   To be used 3 times daily with Novolog Flexpen.      nystatin 151807 UNIT/GM powder  Generic drug:  nystatin   APPLY TOPICALLY 3 TIMES A DAY      O2  Commonly known as:  OXYGEN   2 L/min, Inhalation, Nightly, Uses 2L  overnight only      omeprazole 40 MG capsule  Commonly known as:  priLOSEC   TAKE ONE CAPSULE BY MOUTH EVERY DAY      potassium chloride 10 MEQ CR tablet  Commonly known as:  K-DUR   TAKE ONE (1) TABLET ORALLY (BY MOUTH) ONCE DAILY      promethazine 12.5 MG tablet  Commonly known as:  PHENERGAN   TABLET ONE-HALF TABLET TO ONE TABLET BY MOUTH EVERY 6 HOURS AS NEEDED FOR NAUSEA      [employer] MINI PEN NEEDLES 31G X 6 MM misc  Generic drug:  Insulin Pen Needle   USE TO INJECT " INSULINS 4 TIMES DAILY AS DIRECTED         Stop These Medications    HYDROcodone-acetaminophen 5-325 MG per tablet  Commonly known as:  NORCO  Replaced by:  HYDROcodone-acetaminophen 7.5-325 MG per tablet            Last Lab Results:   Lab Results (most recent)     Procedure Component Value Units Date/Time    POC Glucose Once [[id number]]  (Abnormal) Collected:  09/20/19 1147    Specimen:  Blood Updated:  09/20/19 1204     Glucose 200 mg/dL     POC Glucose Once [[id number]]  (Abnormal) Collected:  09/20/19 0720    Specimen:  Blood Updated:  09/20/19 0736     Glucose 242 mg/dL     Basic Metabolic Panel [[id number]]  (Abnormal) Collected:  09/20/19 0433    Specimen:  Blood Updated:  09/20/19 0523     Glucose 277 mg/dL      BUN 28 mg/dL      Creatinine 1.85 mg/dL      Sodium 136 mmol/L      Potassium 4.6 mmol/L      Chloride 97 mmol/L      CO2 30.0 mmol/L      Calcium 9.4 mg/dL      eGFR Non African Amer 26 mL/min/1.73      BUN/Creatinine Ratio 15.1     Anion Gap 9.0 mmol/L     Narrative:       GFR Normal >60  Chronic Kidney Disease <60  Kidney Failure <15    CBC & Differential [[id number]] Collected:  09/20/19 0433    Specimen:  Blood Updated:  09/20/19 0506    Narrative:       The following orders were created for panel order CBC & Differential.  Procedure                               Abnormality         Status                     ---------                               -----------         ------                     CBC Auto Differential[[id number]]        Abnormal            Final result                 Please view results for these tests on the individual orders.    CBC Auto Differential [[id number]]  (Abnormal) Collected:  09/20/19 0433    Specimen:  Blood Updated:  09/20/19 0506     WBC 4.69 10*3/mm3      RBC 2.28 10*6/mm3      Hemoglobin 7.8 g/dL      Hematocrit 24.7 %      .3 fL      MCH 34.2 pg      MCHC 31.6 g/dL      RDW 21.2 %      RDW-SD 81.7 fl      MPV 11.9 fL      Platelets 295 10*3/mm3       Neutrophil % 67.9 %      Lymphocyte % 23.2 %      Monocyte % 3.4 %      Eosinophil % 3.6 %      Basophil % 1.3 %      Immature Grans % 0.6 %      Neutrophils, Absolute 3.18 10*3/mm3      Lymphocytes, Absolute 1.09 10*3/mm3      Monocytes, Absolute 0.16 10*3/mm3      Eosinophils, Absolute 0.17 10*3/mm3      Basophils, Absolute 0.06 10*3/mm3      Immature Grans, Absolute 0.03 10*3/mm3      nRBC 0.0 /100 WBC     Urine Culture - Urine, Urine, Catheter In/Out [[id number]]  (Abnormal)  (Susceptibility) Collected:  09/18/19 1022    Specimen:  Urine, Catheter In/Out Updated:  09/20/19 0313     Urine Culture >100,000 CFU/mL Klebsiella pneumoniae ssp pneumoniae    Susceptibility      Klebsiella pneumoniae ssp pneumoniae     NEEMA     Ampicillin Resistant     Ampicillin + Sulbactam Susceptible     Cefazolin Susceptible     Cefepime Susceptible     Ceftazidime Susceptible     Ceftriaxone Susceptible     Gentamicin Susceptible     Levofloxacin Susceptible     Nitrofurantoin Intermediate     Piperacillin + Tazobactam Susceptible     Tetracycline Susceptible     Trimethoprim + Sulfamethoxazole Susceptible                    Scan Slide [[id number]] Collected:  09/19/19 0547    Specimen:  Blood Updated:  09/19/19 0740     Anisocytosis Slight/1+     Macrocytes Slight/1+     WBC Morphology Normal     Platelet Morphology Normal    Basic Metabolic Panel [[id number]]  (Abnormal) Collected:  09/19/19 0547    Specimen:  Blood Updated:  09/19/19 0655     Glucose 279 mg/dL      BUN 24 mg/dL      Creatinine 1.83 mg/dL      Sodium 137 mmol/L      Potassium 5.1 mmol/L      Chloride 98 mmol/L      CO2 30.5 mmol/L      Calcium 9.4 mg/dL      eGFR Non African Amer 27 mL/min/1.73      BUN/Creatinine Ratio 13.1     Anion Gap 8.5 mmol/L     Narrative:       GFR Normal >60  Chronic Kidney Disease <60  Kidney Failure <15    CBC & Differential [[id number]] Collected:  09/19/19 0547    Specimen:  Blood Updated:  09/19/19 0655    Narrative:       The following  orders were created for panel order CBC & Differential.  Procedure                               Abnormality         Status                     ---------                               -----------         ------                     CBC Auto Differential[[id number]]        Abnormal            Final result                 Please view results for these tests on the individual orders.    CBC Auto Differential [[id number]]  (Abnormal) Collected:  09/19/19 0547    Specimen:  Blood Updated:  09/19/19 0655     WBC 6.39 10*3/mm3      RBC 2.51 10*6/mm3      Hemoglobin 8.7 g/dL      Hematocrit 26.8 %      .8 fL      MCH 34.7 pg      MCHC 32.5 g/dL      RDW 20.4 %      RDW-SD 77.7 fl      MPV 11.5 fL      Platelets 322 10*3/mm3      Neutrophil % 71.8 %      Lymphocyte % 18.2 %      Monocyte % 4.7 %      Eosinophil % 2.8 %      Basophil % 0.8 %      Immature Grans % 1.7 %      Neutrophils, Absolute 4.59 10*3/mm3      Lymphocytes, Absolute 1.16 10*3/mm3      Monocytes, Absolute 0.30 10*3/mm3      Eosinophils, Absolute 0.18 10*3/mm3      Basophils, Absolute 0.05 10*3/mm3      Immature Grans, Absolute 0.11 10*3/mm3      nRBC 0.0 /100 WBC     Folate [[id number]]  (Normal) Collected:  09/18/19 0943    Specimen:  Blood Updated:  09/18/19 1634     Folate 15.30 ng/mL     Vitamin B12 [[id number]]  (Normal) Collected:  09/18/19 0943    Specimen:  Blood Updated:  09/18/19 1634     Vitamin B-12 436 pg/mL     Vitamin D 25 Hydroxy [[id number]]  (Abnormal) Collected:  09/18/19 0943    Specimen:  Blood Updated:  09/18/19 1634     25 Hydroxy, Vitamin D 24.9 ng/ml     Narrative:       Reference Range for Total Vitamin D 25(OH)     Deficiency <20.0 ng/mL   Insufficiency 21-29 ng/mL   Sufficiency  ng/mL  Toxicity >100 ng/ml    Scan Slide [[id number]] Collected:  09/18/19 0943    Specimen:  Blood Updated:  09/18/19 1117     Anisocytosis Slight/1+     Macrocytes Slight/1+     WBC Morphology Normal     Large Platelets Slight/1+    Urinalysis,  Microscopic Only - Urine, Catheter In/Out [[id number]]  (Abnormal) Collected:  09/18/19 1022    Specimen:  Urine, Catheter In/Out Updated:  09/18/19 1107     RBC, UA 3-5 /HPF      WBC, UA 31-50 /HPF      Bacteria, UA 3+ /HPF      Squamous Epithelial Cells, UA 3-6 /HPF      Hyaline Casts, UA None Seen /LPF      Methodology Manual Light Microscopy    Hemoglobin A1c [[id number]]  (Abnormal) Collected:  09/18/19 0943    Specimen:  Blood Updated:  09/18/19 1053     Hemoglobin A1C 8.00 %     Narrative:       Hemoglobin A1C Ranges:    Increased Risk for Diabetes  5.7% to 6.4%  Diabetes                     >= 6.5%  Diabetic Goal                < 7.0%    Urinalysis With Culture If Indicated - Urine, Catheter In/Out [[id number]]  (Abnormal) Collected:  09/18/19 1022    Specimen:  Urine, Catheter In/Out Updated:  09/18/19 1049     Color, UA Other     Appearance, UA Slightly Cloudy     pH, UA 7.0     Specific Gravity, UA 1.020     Glucose, UA Negative     Ketones, UA Negative     Bilirubin, UA Negative     Blood, UA Trace     Protein, UA 30 mg/dL (1+)     Leuk Esterase, UA Moderate (2+)     Nitrite, UA Negative     Urobilinogen, UA 0.2 E.U./dL    TSH [[id number]]  (Abnormal) Collected:  09/18/19 0943    Specimen:  Blood Updated:  09/18/19 1014     TSH 6.340 uIU/mL     Comprehensive Metabolic Panel [[id number]]  (Abnormal) Collected:  09/18/19 0943    Specimen:  Blood Updated:  09/18/19 1007     Glucose 195 mg/dL      BUN 20 mg/dL      Creatinine 1.78 mg/dL      Sodium 141 mmol/L      Potassium 4.1 mmol/L      Chloride 101 mmol/L      CO2 29.0 mmol/L      Calcium 8.9 mg/dL      Total Protein 6.0 g/dL      Albumin 3.60 g/dL      ALT (SGPT) 5 U/L      AST (SGOT) 7 U/L      Alkaline Phosphatase 93 U/L      Total Bilirubin 0.8 mg/dL      eGFR Non African Amer 28 mL/min/1.73      Globulin 2.4 gm/dL      A/G Ratio 1.5 g/dL      BUN/Creatinine Ratio 11.2     Anion Gap 11.0 mmol/L     Narrative:       GFR Normal >60  Chronic Kidney Disease  <60  Kidney Failure <15        Imaging Results (most recent)     Procedure Component Value Units Date/Time    XR Foot 3+ View Right [[id number]] Collected:  09/20/19 1333     Updated:  09/20/19 1336    Narrative:       RIGHT FOOT, 09/20/2019         HISTORY:  79-year-old female with right knee injury after a fall 4 days ago. She  also complains of right foot and ankle pain and swelling since the  injury.     TECHNIQUE:  Three-view right foot series.     FINDINGS:  No fracture, dislocation or other acute osseous abnormality is  demonstrated. Soft tissue swelling is visible throughout the foot.       Impression:       Soft tissue swelling. Right foot series is otherwise negative.     This report was finalized on 9/20/2019 1:34 PM by Dr. Jacques Bates MD.       XR Ankle 3+ View Right [[id number]] Collected:  09/20/19 1330     Updated:  09/20/19 1335    Narrative:       RIGHT ANKLE, 09/20/2019         HISTORY:  79-year-old female with right knee injury after a fall 4 days ago. She  also complains of right foot and ankle pain and swelling since the  injury.     TECHNIQUE:  Three-view right ankle series.     FINDINGS:  No fracture, dislocation, arthropathy or other osseous abnormality is  demonstrated. Soft tissue swelling surrounds the ankle, and soft tissue  edema is visible in the lower leg.       Impression:       Soft tissue swelling. Right ankle series is otherwise negative.     This report was finalized on 9/20/2019 1:33 PM by Dr. Jacques Bates MD.       SCANNED - IMAGING [[id number]] Resulted:  09/16/19      Updated:  09/18/19 1332    MRI Knee Right Without Contrast [[id number]] Collected:  09/17/19 1927     Updated:  09/17/19 1929    Narrative:       MRI Knee RT WO    INDICATION:    Right knee pain and swelling for the past 2 days    TECHNIQUE:   MRI of the  right knee without contrast.    COMPARISON:   None.    FINDINGS:  There is a moderately large joint effusion. Cartilage thinning is noted to a mild  degree along the patellar undersurface. There is mild to moderate cartilage thinning in the medial and lateral compartments with no osteochondral defects or fragments. In  the lateral meniscus there is generalized intrameniscal degenerative signal change with no displaced meniscal tears. In the medial meniscus there is an oblique tear in the posterior horn with a small radial component in the far posterior horn. No bucket  handle fragments are seen. The cruciate ligaments and collateral ligaments are intact. The quadriceps tendon and patellar tendon have normal signal. No marrow edema. Generalized soft tissue swelling is seen around the knee without any discrete fluid  collections or masses identified.      Impression:       Complex tear posterior horn of the medial meniscus. Lateral meniscal degeneration. Mild to moderate cartilage thinning in all 3 compartments with no focal osteochondral defects. These changes are accompanied by moderately large joint effusion and  generalized soft tissue swelling around the knee.    Signer Name: Casey Quiles MD   Signed: 9/17/2019 7:27 PM   Workstation Name: [id number]    [facility]    XR Tibia Fibula 2 View Right [[id number]] Collected:  09/16/19 1417     Updated:  09/16/19 1420    Narrative:       XR TIBIA FIBULA 2 VW RIGHT-: 9/16/2019 2:13 PM     INDICATION:   Fall this morning with right lower leg pain.     COMPARISON:   None available.     FINDINGS:   2 views of the right tibia/fibula.  No fracture or dislocation.  No bone  erosion or destruction. Articulation at the knee and ankle is anatomic..   No foreign body. There is some lateral soft tissue swelling suggested  at the ankle       Impression:       There may be some lateral soft tissue swelling of the ankle. Otherwise  study is normal.     This report was finalized on 9/16/2019 2:18 PM by Dr. Henry Mendiola MD.       XR Knee 1 or 2 View Right [[id number]] Collected:  09/16/19 1417     Updated:   09/16/19 1419    Narrative:       XR KNEE 1 OR 2 VW RIGHT-: 9/16/2019 2:07 PM     INDICATION:   Right knee pain since yesterday.     COMPARISON:   None available.     FINDINGS:  2 view(s) of the right knee.  No fracture, dislocation, or effusion. No  bone erosion or destruction.  No foreign body.       Impression:       Negative right knee.     This report was finalized on 9/16/2019 2:17 PM by Dr. Henry Mendiola MD.           PROCEDURES: NONE    Condition on Discharge:  stable    Physical Exam at Discharge  Vital Signs  Temp:  [97.2 °F (36.2 °C)-98.6 °F (37 °C)] 97.2 °F (36.2 °C)  Heart Rate:  [72-93] 76  Resp:  [19-20] 20  BP: (122-165)/(52-77) 128/52  Body mass index is 33.83 kg/m².    Physical Exam Taken from exam of earlier today, unchanged:   Constitutional: She is oriented to person, place, and time. She appears well-developed and well-nourished.   Obese   HENT:   Head: Normocephalic and atraumatic.   Right facial droop   Eyes: EOM are normal. Pupils are equal, round, and reactive to light.   Cardiovascular: Normal rate and regular rhythm.   Pulmonary/Chest: Effort normal.   Diminished bilateral bases   Abdominal: Soft. Bowel sounds are normal. She exhibits no distension. There is no tenderness. There is no guarding.   Musculoskeletal: 1+ bilateral ankle edema  Right medial and lateral ankle area is tender to touch as well as top of foot tenderness, unable to flex right foot   Neurological: She is alert and oriented to person, place, and time.   Skin: Skin is warm and dry. No erythema.   Psychiatric: She has a normal mood and affect. Her behavior is normal.   Vitals reviewed.    Discharge Disposition  Signature    Visiting Nurse:    Yes     PT/OT:  Yes     Safety Evaluation:  Yes     DME  TBD    Discharge Diet:      Dietary Orders (From admission, onward)    Start     Ordered    09/18/19 1042  Diet Regular; Consistent Carbohydrate  Diet Effective Now     Question Answer Comment   Diet Texture / Consistency Regular     Common Modifiers Consistent Carbohydrate        09/18/19 1041          Activity at Discharge:  As tolerated    Pre-discharge education  Diabetic, Injectables, medications, follow up    Follow-up Appointments  Future Appointments   Date Time Provider Department Center   9/26/2019  9:00 AM LAB CHAIR 1 [facility]   9/26/2019  9:15 AM INJ/PORT CHAIR 4 [facility] INFUS [facility]   10/1/2019  9:45 AM Chari Mercado MD [facility] None   10/3/2019  9:00 AM LAB CHAIR 1 [facility]   10/3/2019  9:15 AM INJ/PORT CHAIR 4 [facility]   10/10/2019  9:10 AM LAB CHAIR 1 [facility]   10/10/2019  9:45 AM INJ/PORT CHAIR 4 [facility]   10/17/2019  9:00 AM LAB CHAIR 1 [facility]   10/17/2019  9:15 AM INJ/PORT CHAIR 4 [facility] INFUS [facility]   10/24/2019  9:00 AM LAB CHAIR 1 [facility]   10/24/2019  9:15 AM INJ/PORT CHAIR 4 [facility] INF[facility]   10/31/2019  9:00 AM LAB CHAIR 1 [facility]   10/31/2019  9:15 AM INJ/PORT CHAIR 4 [facility] INF[facility]   11/6/2019  8:40 AM LAB CHAIR 1 [facility]   11/6/2019  9:00 AM Elieser Headley MD [facility]   11/6/2019  9:30 AM INJ/PORT CHAIR 4 [facility] INF[facility]   12/3/2019  1:30 PM Kehinde Sanon MD [facility] None     Additional Instructions for the Follow-ups that You Need to Schedule     Discharge Follow-up with PCP   As directed       Currently Documented PCP:    Chari Mercado MD    PCP Phone Number:    [phone]     Follow Up Details:  1 week         Discharge Follow-up with Specialty: Orthopedic surgery; 3 Weeks   As directed      Specialty:  Orthopedic surgery    Follow Up:  3 Weeks             Test Results Pending at Discharge: NONE     REY Simon  09/20/19  4:04 PM    Time: Discharge over 30 min (if over 30 minutes give explanation as to why it took greater than 30  minutes)  Secondary to:   Coordination of care/follow up  Medication reconciliation  D/W patient

## 2019-09-20 NOTE — THERAPY DISCHARGE NOTE
Acute Care - Occupational Therapy Treatment Note/Discharge  VICKY Sharma     Patient Name: Joya Singh  : 1940  MRN: [id number]  Today's Date: 2019  Onset of Illness/Injury or Date of Surgery: 19  Date of Referral to OT: 19  Referring Physician: Chanell      Admit Date: 2019    Visit Dx:     ICD-10-CM ICD-9-CM   1. 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)   • Brittle diabetes mellitus (CMS/HCC)   • Hyperlipidemia   • Hypertension   • Insomnia with sleep apnea   • Obstructive sleep apnea syndrome   • Peripheral neuropathy   • Diabetic gastroparesis (CMS/HCC)   • Primary localized osteoarthrosis of left shoulder region   • Rotator cuff tendonitis   • Acquired hypothyroidism   • Anxiety   • Gastroesophageal reflux disease   • History of biliary T-tube placement   • 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)   • Complex tear of medial meniscus of left knee as current injury   • Insufficiency fracture of tibia   • Primary osteoarthritis of left knee   • Orthostatic hypotension   • Tendinitis of right rotator cuff   • AC joint arthropathy   • Subacromial impingement of right shoulder   • Right carpal tunnel syndrome   • Anemia requiring transfusions   • Monoclonal gammopathy of unknown significance (MGUS)   • Myelodysplastic syndrome with 5q deletion (CMS/HCC)   • History of deep venous thrombosis (DVT) of distal vein of left lower extremity   • Moderate episode of recurrent major depressive disorder (CMS/HCC)   • Chronic diastolic CHF (congestive heart failure) (CMS/HCC)   • Knee pain       Therapy Treatment    Rehabilitation Treatment Summary     Row Name 19 1330          Treatment Time/Intention    Discipline  occupational therapist  -SD      Document Type  discharge treatment  -SD     Subjective Information  complains of;pain  -SD     Mode of Treatment  occupational therapy  -SD     Patient/Family Observations  Pt was reclined in chair.  Pt agreeable to therapy.  -SD     Care Plan Review  evaluation/treatment results reviewed;care plan/treatment goals reviewed;risks/benefits reviewed;current/potential barriers reviewed;patient/other agree to care plan pt states she is going to SNF today  -SD     Patient Effort  adequate  -SD     Comment  --     Existing Precautions/Restrictions  --     Recorded by [SD] Michael Villa, OTR 09/20/19 1528     Row Name 09/20/19 1330          Cognitive Assessment/Intervention- PT/OT    Personal Safety Interventions  gait belt;fall prevention program maintained;nonskid shoes/slippers when out of bed;supervised activity  -SD     Recorded by [SD] Michael Villa, OTR 09/20/19 1528           Row Name 09/20/19 1330             Transfer Assessment/Treatment    Comment (Transfers)  Pt reported pain limited her activity.  -SD      Recorded by [SD] Michael Villa OTR 09/20/19 1528      Row Name 09/20/19 1330          Sit-Stand Transfer    Sit-Stand [place] (Transfers)  moderate assist (50% patient effort)  -SD     Assistive Device (Sit-Stand Transfers)  walker, front-wheeled  -SD     Recorded by [SD] Michael Villa OTR 09/20/19 1528     Row Name 09/20/19 1330          Stand-Sit Transfer    Stand-Sit [place] (Transfers)  minimum assist (75% patient effort)  -SD     Assistive Device (Stand-Sit Transfers)  walker, front-wheeled  -SD     Recorded by [SD] Michael Villa OTR 09/20/19 1528        Row Name 09/20/19 1330             Lower Body Dressing Assessment/Training    Lower Body Dressing [place] Level  lower body dressing skills  -SD      Lower Body Dressing Position  supported sitting  -SD      Comment (Lower Body Dressing)  Education with management of RLE brace.  Pt states she does not have the  flexibilty to reacher all the fasteners/straps on the brace.  Pt could release and reattach all the straps except for the most distal fastener.  Pt may benefit from AE or modification to the strap to increase function at the SNF if she continues to have difficulty.  -SD      Recorded by [SD] Michael Villa, OTR 09/20/19 1528      Row Name 09/20/19 1330             BADL Safety/Performance    Impairments, BADL Safety/Performance  pain  -SD      Skilled BADL Treatment/Intervention  compensatory training;BADL process/adaptation training  -SD      Recorded by [SD] Michael Villa, OTR 09/20/19 1528      Row Name 09/20/19 1330          Positioning and Restraints    Pre-Treatment Position  sitting in chair/recliner  -SD     Post Treatment Position  chair  -SD     In Chair  reclined;call light within reach;encouraged to call for assist;notified nsg;legs elevated  -SD     Recorded by [SD] Michael Villa, OTR 09/20/19 1528     Row Name 09/20/19 1330          Pain Scale: Numbers Pre/Post-Treatment    Pain Scale: Numbers, Pretreatment  10/10  -SD     Pain Scale: Numbers, Post-Treatment  10/10  -SD     Pain Location - Side  Right  -SD     Pain Location  foot  -SD     Pre/Post Treatment Pain Comment  nursing notified regarding pt's pain level.    -SD     Pain Intervention(s)  Repositioned  -SD     Recorded by [SD] Michael Villa, OTR 09/20/19 1528        Row Name 09/20/19 1330             Outcome Summary/Treatment Plan (OT)    Daily Summary of Progress (OT)  prepare for discharge  -SD      Plan for Continued Treatment (OT)  Pt states she is transferring to SNF today.  Rec continue with therapy.  -SD      Anticipated Discharge Disposition (OT)  skilled nursing facility  -SD      Reason for Discharge (OT Discharge Summary)  other (see comments) pt states she is being discharged to a SNF  -SD      Recorded by [SD] Michael Villa, OTR 09/20/19 1528           User Key  (r) = Recorded By, (t) = Taken By, (c) = Cosigned By     Initials Name Effective Dates Discipline    SD Michael Villa, OTR 06/22/16 -  OT    JW Steffany Noble, PT 04/03/18 -  PT             Rehab Goal Summary     Row Name 09/20/19 1330             Transfer Goal 1 (OT)    Activity/Assistive Device (Transfer Goal 1, OT)  toilet;commode, 3-in-1;walker, rolling  -SD      [place] Level/Cues Needed (Transfer Goal 1, OT)  supervision required  -SD      Time Frame (Transfer Goal 1, OT)  5 days  -SD      Barriers (Transfers Goal 1, OT)  pain  -SD      Progress/Outcome (Transfer Goal 1, OT)  goal not met;discharged from facility  -SD         Dressing Goal 1 (OT)    Activity/Assistive Device (Dressing Goal 1, OT)  lower body dressing  -SD      [place]/Cues Needed (Dressing Goal 1, OT)  standby assist  -SD      Time Frame (Dressing Goal 1, OT)  5 days  -SD      Barriers (Dressing Goal 1, OT)  pain  -SD      Progress/Outcome (Dressing Goal 1, OT)  goal not met;discharged from facility  -SD         Balance Goal 1 (OT)    Activity/Assistive Device (Balance Goal 1, OT)  standing, dynamic  -SD      [place] Level/Cues Needed (Balance Goal 1, OT)  other (see comments) x 1-2 minutes for adl activity  -SD      Time Frame (Balance Goal 1, OT)  5 days  -SD      Barriers (Balance Goal 1, OT)  pain  -SD      Progress/Outcomes (Balance Goal 1, OT)  goal not met;discharged from facility  -SD        User Key  (r) = Recorded By, (t) = Taken By, (c) = Cosigned By    Initials Name Provider Type Discipline    Michael Raygoza, OTR Occupational Therapist OT          Occupational Therapy Education     Title: PT OT SLP Therapies (Done)     Topic: Occupational Therapy (Done)     Point: ADL training (Done)     Description: Instruct learner(s) on proper safety adaptation and remediation techniques during self care or transfers.   Instruct in proper use of assistive devices.    Learning Progress Summary           Patient Acceptance, E,TB,D, VU,DU by SD at 9/20/2019  3:21 PM     Comment:  Education with benefits of activity and BUE arom program.  Education regarding compensatory strategies due to RLE pain and limited rom/strength.  Pt plans to continue with therapy at SNF                   Point: Body mechanics (Done)     Description: Instruct learner(s) on proper positioning and spine alignment during self-care, functional mobility activities and/or exercises.    Learning Progress Summary           Patient Acceptance, E,TB, VU by EMILIA at 9/19/2019  2:31 PM    Comment:  pt educated on benefits of activity, adls, and safety with functional transfers                               User Key     Initials Effective Dates Name Provider Type Discipline    SD 06/22/16 -  Michael Villa, OTR Occupational Therapist OT    EMILIA 06/22/16 -  Haleigh Alarcon, OTR Occupational Therapist OT                OT Recommendation and Plan  Outcome Summary/Treatment Plan (OT)  Daily Summary of Progress (OT): prepare for discharge  Plan for Continued Treatment (OT): Pt states she is transferring to SNF today.  Rec continue with therapy.  Anticipated Discharge Disposition (OT): skilled nursing facility  Reason for Discharge (OT Discharge Summary): other (see comments)(pt states she is being discharged to a SNF)  Daily Summary of Progress (OT): prepare for discharge    Outcome Measures     Row Name 09/20/19 1330 09/20/19 0908 09/19/19 0930       How much help from another person do you currently need...    Turning from your back to your side while in flat bed without using bedrails?  --  3  -JW  --    Moving from lying on back to sitting on the side of a flat bed without bedrails?  --  3  -JW  --    Moving to and from a bed to a chair (including a wheelchair)?  --  3  -JW  --    Standing up from a chair using your arms (e.g., wheelchair, bedside chair)?  --  3  -JW  --    Climbing 3-5 steps with a railing?  --  2  -JW  --    To walk in hospital room?  --  3  -JW  --    AM-PAC 6 Clicks Score (PT)  --  17  -JW  --        How much help from another is currently needed...    Putting on and taking off regular lower body clothing?  3  -SD  --  3  -JJ    Bathing (including washing, rinsing, and drying)  3  -SD  --  3  -JJ    Toileting (which includes using toilet bed pan or urinal)  3  -SD  --  3  -JJ    Putting on and taking off regular upper body clothing  4  -SD  --  4  -JJ    Taking care of personal grooming (such as brushing teeth)  4  -SD  --  4  -JJ    Eating meals  4  -SD  --  4  -JJ    AM-PAC 6 Clicks Score (OT)  21  -SD  --  21  -JJ       Functional Assessment    Outcome Measure Options  AM-PAC 6 Clicks Daily Activity (OT)  -SD  AM-PAC 6 Clicks Basic Mobility (PT)  -JW  AM-PAC 6 Clicks Daily Activity (OT)  -JJ    Row Name 09/19/19 0846             How much help from another person do you currently need...    Turning from your back to your side while in flat bed without using bedrails?  2  -JWA      Moving from lying on back to sitting on the side of a flat bed without bedrails?  2  -JWA      Moving to and from a bed to a chair (including a wheelchair)?  2  -JWA      Standing up from a chair using your arms (e.g., wheelchair, bedside chair)?  2  -JWA      Climbing 3-5 steps with a railing?  1  -JWA      To walk in hospital room?  2  -JWA      AM-PAC 6 Clicks Score (PT)  11  -JWA         Functional Assessment    Outcome Measure Options  AM-PAC 6 Clicks Basic Mobility (PT)  -[facility]        User Key  (r) = Recorded By, (t) = Taken By, (c) = Cosigned By    Initials Name Provider Type    Michael Raygoza, OTR Occupational Therapist    Haleigh Callahan, OTR Occupational Therapist    Steffany Cherry, PT Physical Therapist    Syl Tong, PT Physical Therapist           Time Calculation:    Time Calculation- OT     Row Name 09/20/19 1520             Time Calculation- OT    OT Start Time  1330  -SD        User Key  (r) = Recorded By, (t) = Taken By, (c) = Cosigned By    Initials Name Provider Type    TONY Villa  ROLA Sotelo Occupational Therapist        Therapy Suggested Charges     Code   Minutes Charges    None           Therapy Charges for Today     Code Description Service Date Service Provider Modifiers Qty    [id number]  OT THERAPEUTIC ACT EA 15 MIN 9/20/2019 Micheal Villa OTR GO 1               OT Discharge Summary  Anticipated Discharge Disposition (OT): skilled nursing facility    ROLA Packer  9/20/2019

## 2019-09-20 NOTE — THERAPY TREATMENT NOTE
Acute Care - Physical Therapy Treatment Note  VICKY Sharma     Patient Name: Joya Singh  : 1940  MRN: [id number]  Today's Date: 2019  Onset of Illness/Injury or Date of Surgery: 19  Date of Referral to PT: 19  Referring Physician: Chanell    Admit Date: 2019    Visit Dx:    ICD-10-CM ICD-9-CM   1. 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)   • Brittle diabetes mellitus (CMS/HCC)   • Hyperlipidemia   • Hypertension   • Insomnia with sleep apnea   • Obstructive sleep apnea syndrome   • Peripheral neuropathy   • Diabetic gastroparesis (CMS/HCC)   • Primary localized osteoarthrosis of left shoulder region   • Rotator cuff tendonitis   • Acquired hypothyroidism   • Anxiety   • Gastroesophageal reflux disease   • History of biliary T-tube placement   • 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)   • Complex tear of medial meniscus of left knee as current injury   • Insufficiency fracture of tibia   • Primary osteoarthritis of left knee   • Orthostatic hypotension   • Tendinitis of right rotator cuff   • AC joint arthropathy   • Subacromial impingement of right shoulder   • Right carpal tunnel syndrome   • Anemia requiring transfusions   • Monoclonal gammopathy of unknown significance (MGUS)   • Myelodysplastic syndrome with 5q deletion (CMS/HCC)   • History of deep venous thrombosis (DVT) of distal vein of left lower extremity   • Moderate episode of recurrent major depressive disorder (CMS/HCC)   • Chronic diastolic CHF (congestive heart failure) (CMS/HCC)   • Knee pain       Therapy Treatment    Rehabilitation Treatment Summary     Row Name 19 0908             Treatment Time/Intention    Discipline  physical therapist  -      Document Type   therapy note (daily note)  -JW      Subjective Information  complains of;pain  -JW      Mode of Treatment  physical therapy  -JW      Patient/Family Observations  pt supine, agreeable to therapy  -JW      Care Plan Review  care plan/treatment goals reviewed;risks/benefits reviewed;patient/other agree to care plan  -JW      Patient Effort  adequate  -JW      Comment  c/o right LE pain  -JW      Existing Precautions/Restrictions  fall  -JW      Recorded by [JW] Steffany Noble, PT 09/20/19 1132      Row Name 09/20/19 0908             Cognitive Assessment/Intervention- PT/OT    Personal Safety Interventions  gait belt;nonskid shoes/slippers when out of bed  -JW      Recorded by [JW] Steffany Noble, PT 09/20/19 1132      Row Name 09/20/19 0908             Bed Mobility Assessment/Treatment    Bed Mobility Assessment/Treatment  supine-sit  -JW      Supine-Sit [place] (Bed Mobility)  verbal cues;supervision  -JW2      Assistive Device (Bed Mobility)  bed rails;head of bed elevated  -JW      Comment (Bed Mobility)  requires assistance to move right LE out of bed  -JW      Recorded by [JW] Steffany Noble, PT 09/20/19 1132  [JW2] Steffany Noble, PT 09/20/19 1134      Row Name 09/20/19 0908             Sit-Stand Transfer    Sit-Stand [place] (Transfers)  contact guard;verbal cues  -      Assistive Device (Sit-Stand Transfers)  walker, front-wheeled  -JW      Recorded by [JW] Steffany Noble, PT 09/20/19 1132      Row Name 09/20/19 0908             Stand-Sit Transfer    Stand-Sit [place] (Transfers)  contact guard;verbal cues  -JW      Assistive Device (Stand-Sit Transfers)  walker, front-wheeled  -JW      Recorded by [JW] Steffany Noble, PT 09/20/19 1132      Row Name 09/20/19 0908             Gait/Stairs Assessment/Training    [place] Level (Gait)  contact guard;verbal cues  -      Assistive Device (Gait)  walker, front-wheeled  -JW      Distance in Feet (Gait)  6  -JW      Pattern (Gait)  step-to   -JW      Deviations/Abnormal Patterns (Gait)  antalgic;saemera decreased  -JW      Bilateral Gait Deviations  forward flexed posture  -JW      Comment (Gait/Stairs)  pt refused further gait distance due to reported pain.  Patient requires cues for safety with walker during direction changes.  knee immobilizer in place with mobility  -JW      Recorded by [JW] Steffany Noble, PT 09/20/19 1132      Row Name 09/20/19 0908             Positioning and Restraints    Pre-Treatment Position  in bed  -JW      Post Treatment Position  chair  -JW      In Chair  reclined;call light within reach;encouraged to call for assist  -JW      Recorded by [JW] Steffany Noble, PT 09/20/19 1132      Row Name 09/20/19 0908             Pain Scale: Numbers Pre/Post-Treatment    Pain Scale: Numbers, Pretreatment  4/10  -JW      Pain Scale: Numbers, Post-Treatment  8/10  -JW      Pain Location - Side  Right  -JW      Pain Location  extremity  -JW      Pain Intervention(s)  Repositioned;Ambulation/increased activity  -JW      Recorded by [JW] Steffany Noble, PT 09/20/19 1132      Row Name 09/20/19 0908             Plan of Care Review    Plan of Care Reviewed With  patient  -JW      Recorded by [JW] Steffany Noble, PT 09/20/19 1132      Row Name 09/20/19 0908             Outcome Summary/Treatment Plan (PT)    Daily Summary of Progress (PT)  progress towards functional goals is fair  -JW      Barriers to Overall Progress (PT)  reported pain  -JW      Patient/Family Concerns, Anticipated Discharge Disposition (PT)  pt reports she is awaiting pre-cert at [facility]  -JW      Recorded by [JW] Steffany Noble, PT 09/20/19 1132        User Key  (r) = Recorded By, (t) = Taken By, (c) = Cosigned By    Initials Name Effective Dates Discipline    JW Steffany Noble, PT 04/03/18 -  PT                   Physical Therapy Education     Title: PT OT SLP Therapies (In Progress)     Topic: Physical Therapy (Done)     Point: Mobility training (Done)      Learning Progress Summary           Patient Acceptance, E,TB, VU by JW at 9/20/2019 11:32 AM    Acceptance, E,TB, VU by JW1 at 9/19/2019 11:30 AM                   Point: Home exercise program (Done)     Learning Progress Summary           Patient Acceptance, E,TB, VU by JW1 at 9/19/2019 11:30 AM                   Point: Body mechanics (Done)     Learning Progress Summary           Patient Acceptance, E,TB, VU by JW1 at 9/19/2019 11:30 AM                   Point: Precautions (Done)     Learning Progress Summary           Patient Acceptance, E,TB, VU by JW1 at 9/19/2019 11:30 AM                               User Key     Initials Effective Dates Name Provider Type Discipline     04/03/18 -  Steffany Noble, PT Physical Therapist PT    [facility] 06/20/18 -  Syl Avila, PT Physical Therapist PT                PT Recommendation and Plan     Outcome Summary/Treatment Plan (PT)  Daily Summary of Progress (PT): progress towards functional goals is fair  Barriers to Overall Progress (PT): reported pain  Patient/Family Concerns, Anticipated Discharge Disposition (PT): pt reports she is awaiting pre-cert at [facility]  Plan of Care Reviewed With: patient  Outcome Summary: PT: Patient performs supine to sit with supervision and sit to/from stand with CGA.  Patient performs gait with rolling walker x6 feet with CGA and use of immobilizer.  Patient refuses further gait distance due to reported knee pain.  Patient requires encouragement for progressive activity during session.  Outcome Measures     Row Name 09/20/19 0908 09/19/19 0930 09/19/19 0846       How much help from another person do you currently need...    Turning from your back to your side while in flat bed without using bedrails?  3  -JW  --  2  -JWA    Moving from lying on back to sitting on the side of a flat bed without bedrails?  3  -JW  --  2  -JWA    Moving to and from a bed to a chair (including a wheelchair)?  3  -JW  --  2  -JWA    Standing up from a chair  using your arms (e.g., wheelchair, bedside chair)?  3  -JW  --  2  -JWA    Climbing 3-5 steps with a railing?  2  -JW  --  1  -JWA    To walk in hospital room?  3  -JW  --  2  -JWA    AM-PAC 6 Clicks Score (PT)  17  -JW  --  11  -JWA       How much help from another is currently needed...    Putting on and taking off regular lower body clothing?  --  3  -JJ  --    Bathing (including washing, rinsing, and drying)  --  3  -JJ  --    Toileting (which includes using toilet bed pan or urinal)  --  3  -JJ  --    Putting on and taking off regular upper body clothing  --  4  -JJ  --    Taking care of personal grooming (such as brushing teeth)  --  4  -JJ  --    Eating meals  --  4  -JJ  --    AM-PAC 6 Clicks Score (OT)  --  21  -JJ  --       Functional Assessment    Outcome Measure Options  AM-PAC 6 Clicks Basic Mobility (PT)  -  AM-PAC 6 Clicks Daily Activity (OT)  -  AM-PAC 6 Clicks Basic Mobility (PT)  -[facility]      User Key  (r) = Recorded By, (t) = Taken By, (c) = Cosigned By    Initials Name Provider Type    Haleigh Callahan, OTR Occupational Therapist    Steffany Cherry, PT Physical Therapist    Syl Tong, PT Physical Therapist         Time Calculation:   PT Charges     Row Name 09/20/19 1136             Time Calculation    Start Time  0908  -      Stop Time  0918  -      Time Calculation (min)  10 min  -      PT Received On  09/20/19  -         Timed Charges    18036 - PT Therapeutic Exercise Minutes  10  -        User Key  (r) = Recorded By, (t) = Taken By, (c) = Cosigned By    Initials Name Provider Type    Steffany Cherry, PT Physical Therapist        Therapy Charges for Today     Code Description Service Date Service Provider Modifiers Qty    93929768282 HC PT THER PROC EA 15 MIN 9/20/2019 Steffany Noble, PT GP 1          PT G-Codes  Outcome Measure Options: AM-PAC 6 Clicks Basic Mobility (PT)  AM-PAC 6 Clicks Score (PT): 17  AM-PAC 6 Clicks Score (OT): 21    Steffany Noble  PT  9/20/2019

## 2019-09-21 NOTE — THERAPY DISCHARGE NOTE
Acute Care - Physical Therapy Discharge Summary   Angie Doran       Patient Name: Joya Singh  : 1940  MRN: [id number]    Today's Date: 2019  Onset of Illness/Injury or Date of Surgery: 19    Date of Referral to PT: 19  Referring Physician: Chanell      Admit Date: 2019      PT Recommendation and Plan    Visit Dx:    ICD-10-CM ICD-9-CM   1. Right knee pain, unspecified chronicity M25.561 719.46   2. Type 2 diabetes mellitus with diabetic neuropathy, with long-term current use of insulin (CMS/[facility]) E11.40 250.60    Z79.4 357.2     V58.67       Outcome Measures     Row Name 19 1330 19 0908 19 0930       How much help from another person do you currently need...    Turning from your back to your side while in flat bed without using bedrails?  --  3  -JW  --    Moving from lying on back to sitting on the side of a flat bed without bedrails?  --  3  -JW  --    Moving to and from a bed to a chair (including a wheelchair)?  --  3  -JW  --    Standing up from a chair using your arms (e.g., wheelchair, bedside chair)?  --  3  -JW  --    Climbing 3-5 steps with a railing?  --  2  -JW  --    To walk in hospital room?  --  3  -JW  --    AM-PAC 6 Clicks Score (PT)  --  17  -JW  --       How much help from another is currently needed...    Putting on and taking off regular lower body clothing?  3  -SD  --  3  -JJ    Bathing (including washing, rinsing, and drying)  3  -SD  --  3  -JJ    Toileting (which includes using toilet bed pan or urinal)  3  -SD  --  3  -JJ    Putting on and taking off regular upper body clothing  4  -SD  --  4  -JJ    Taking care of personal grooming (such as brushing teeth)  4  -SD  --  4  -JJ    Eating meals  4  -SD  --  4  -JJ    AM-PAC 6 Clicks Score (OT)  21  -SD  --  21  -JJ       Functional Assessment    Outcome Measure Options  AM-PAC 6 Clicks Daily Activity (OT)  -SD  AM-PAC 6 Clicks Basic Mobility (PT)  -SADI  AM-PAC 6 Clicks Daily Activity (OT)  -EMILIA     Row Name 09/19/19 0846             How much help from another person do you currently need...    Turning from your back to your side while in flat bed without using bedrails?  2  -JWA      Moving from lying on back to sitting on the side of a flat bed without bedrails?  2  -JWA      Moving to and from a bed to a chair (including a wheelchair)?  2  -JWA      Standing up from a chair using your arms (e.g., wheelchair, bedside chair)?  2  -JWA      Climbing 3-5 steps with a railing?  1  -JWA      To walk in hospital room?  2  -JWA      AM-PAC 6 Clicks Score (PT)  11  -JWA         Functional Assessment    Outcome Measure Options  AM-PAC 6 Clicks Basic Mobility (PT)  -JWA        User Key  (r) = Recorded By, (t) = Taken By, (c) = Cosigned By    Initials Name Provider Type    SD Michael Villa, OTR Occupational Therapist    Haleigh Callahan, OTR Occupational Therapist    Steffany Cherry, PT Physical Therapist    Syl Tong, PT Physical Therapist              Rehab Goal Summary     Row Name 09/21/19 0958             Transfer Goal 1 (PT)    Activity/Assistive Device (Transfer Goal 1, PT)  transfers, all  -BP      [place] Level/Cues Needed (Transfer Goal 1, PT)  supervision required  -BP      Time Frame (Transfer Goal 1, PT)  3 days  -BP      Barriers (Transfers Goal 1, PT)  pain  -BP      Progress/Outcome (Transfer Goal 1, PT)  goal not met;discharged from facility  -BP         Gait Training Goal 1 (PT)    Activity/Assistive Device (Gait Training Goal 1, PT)  gait (walking locomotion);walker, rolling  -BP      [place] Level (Gait Training Goal 1, PT)  supervision required  -BP      Distance (Gait Goal 1, PT)  25  -BP      Time Frame (Gait Training Goal 1, PT)  3 days  -BP      Barriers (Gait Training Goal 1, PT)  pain  -BP      Progress/Outcome (Gait Training Goal 1, PT)  goal not met;discharged from facility  -BP        User Key  (r) = Recorded By, (t) = Taken By, (c) = Cosigned By     Initials Name Provider Type Discipline    Olegario Wang, PT Physical Therapist PT              PT Discharge Summary  Anticipated Discharge Disposition (PT): skilled nursing facility  Reason for Discharge: Discharge from facility  Outcomes Achieved: (Made progress but did not meet any functional goals )  Discharge Destination: SNF      Olegario Silva, PT   9/21/2019

## 2019-09-21 NOTE — NURSING NOTE
Case Management Discharge Note    Final Note: Discharged to [facility].    Destination      No service has been selected for the patient.      Durable Medical Equipment      No service has been selected for the patient.      Dialysis/Infusion      No service has been selected for the patient.      Home Medical Care      No service has been selected for the patient.      Therapy      No service has been selected for the patient.      Community Resources      No service has been selected for the patient.             Final Discharge Disposition Code: 03 - skilled nursing facility (SNF)

## 2019-09-26 ENCOUNTER — APPOINTMENT (OUTPATIENT)
Dept: ONCOLOGY | Facility: HOSPITAL | Age: 79
End: 2019-09-26

## 2019-09-26 ENCOUNTER — APPOINTMENT (OUTPATIENT)
Dept: LAB | Facility: HOSPITAL | Age: 79
End: 2019-09-26

## 2019-10-03 ENCOUNTER — APPOINTMENT (OUTPATIENT)
Dept: LAB | Facility: HOSPITAL | Age: 79
End: 2019-10-03

## 2019-10-03 ENCOUNTER — APPOINTMENT (OUTPATIENT)
Dept: ONCOLOGY | Facility: HOSPITAL | Age: 79
End: 2019-10-03

## 2019-10-10 ENCOUNTER — APPOINTMENT (OUTPATIENT)
Dept: ONCOLOGY | Facility: HOSPITAL | Age: 79
End: 2019-10-10

## 2019-10-10 ENCOUNTER — APPOINTMENT (OUTPATIENT)
Dept: LAB | Facility: HOSPITAL | Age: 79
End: 2019-10-10

## 2019-10-11 ENCOUNTER — OFFICE VISIT (OUTPATIENT)
Dept: ORTHOPEDIC SURGERY | Facility: CLINIC | Age: 79
End: 2019-10-11

## 2019-10-11 VITALS — HEIGHT: 65 IN | BODY MASS INDEX: 33.82 KG/M2 | WEIGHT: 203 LBS

## 2019-10-11 DIAGNOSIS — M17.11 PRIMARY OSTEOARTHRITIS OF RIGHT KNEE: Primary | ICD-10-CM

## 2019-10-11 PROCEDURE — 99213 OFFICE O/P EST LOW 20 MIN: CPT | Performed by: ORTHOPAEDIC SURGERY

## 2019-10-11 NOTE — PROGRESS NOTES
Subjective:     Patient ID: Joya Singh is a 79 y.o. female.    Chief Complaint: right knee pain, chondromalacia    History of Present Illness  Joya Singh returns to clinic today for evaluation of her right knee.  She states that the injection we did back on September 18 while she was hospitalized provided 70 to 80% relief of her pain but only lasted for approximately 2 to 3 weeks, since then she has had significant recurrence of her pain localized primarily to the medial and anterior aspects of her knee, rates as a 7 out of 10 at this time, exacerbated with prolonged weightbearing, walking, rotational activities.  Denies any significant pain to the hip or groin, denies associated numbness or tingling, denies significant radiation of pain from the knee itself.  Mild swelling that does wax and wane.       Social History     Occupational History     Employer: RETIRED   Tobacco Use   • Smoking status: Never Smoker   • Smokeless tobacco: Never Used   • Tobacco comment: CAFFEINE USE: NONE   Substance and Sexual Activity   • Alcohol use: No   • Drug use: No   • Sexual activity: Defer     Comment: EXERCISE - RARELY      Past Medical History:   Diagnosis Date   • Allergic rhinitis    • Anemia    • Anxiety    • Appetite absent    • Arthritis    • Asthma    • Back pain    • Bell's palsy    • Black tarry stools    • Blood in stool    • Chronic fatigue    • CKD (chronic kidney disease) stage 3, GFR 30-59 ml/min (CMS/[facility])    • Community acquired pneumonia of left lung 10/25/2018   • Cough    • Depression    • Diabetes mellitus (CMS/[facility])     LAST A1C 6   • Diabetic gastroparesis (CMS/[facility]) 2/19/2016   • Difficulty walking    • Excessive urination at night    • Frequent urination    • GERD (gastroesophageal reflux disease)    • GI bleed    • Gout    • H/O blood clots     LEFT LEG 7 OR 8 YEARS AGO   • Heat intolerance    • History of fall 10/2018   • History of prior pregnancies     x8, miscarriage 5   • History of transfusion 11/2018     due to anemia   • Hyperlipidemia    • Hypertension    • Hypothyroidism    • Normal coronary arteries     by cath 2013   • Orthostatic hypotension    • AARON (obstructive sleep apnea)     DOESNT WEAR REGULARLY   • PONV (postoperative nausea and vomiting)    • Skin cancer    • Stroke (CMS/HCC)     Several mini-strokes   • TIA (transient ischemic attack)     LAST TIA JULY 2017   • Urination pain    • UTI (urinary tract infection)     Dec 2018 and Jan 2019     Past Surgical History:   Procedure Laterality Date   • BACK SURGERY      HARDWARE   • CHOLECYSTECTOMY      OPEN   • COLONOSCOPY  2011    due for repeat in 2021   • COLONOSCOPY N/A 10/28/2018    Procedure: COLONOSCOPY;  Surgeon: Emmanuel Rogers MD;  Location: [facility] OR;  Service: Gastroenterology   • ENDOSCOPY N/A 10/26/2018    Procedure: ESOPHAGOGASTRODUODENOSCOPY;  Surgeon: Emmanuel Rogers MD;  Location: [facility] OR;  Service: Gastroenterology   • HYSTERECTOMY      PARTIAL    • KNEE SURGERY     • NECK SURGERY     • SPINE SURGERY     • TUMOR REMOVAL Left     Leg   • UPPER GASTROINTESTINAL ENDOSCOPY  2014    gastritis.  done by dr. hastings       Family History   Problem Relation Age of Onset   • Lupus Mother    • Heart failure Mother 59   • Heart disease Other    • Hypertension Other    • Heart attack Father    • Breast cancer Neg Hx          Review of Systems   Constitutional: Negative for chills, diaphoresis, fever and unexpected weight change.   HENT: Negative for hearing loss, nosebleeds, sore throat and tinnitus.    Eyes: Negative for pain and visual disturbance.   Respiratory: Negative for cough, shortness of breath and wheezing.    Cardiovascular: Negative for chest pain and palpitations.   Gastrointestinal: Negative for abdominal pain, diarrhea, nausea and vomiting.   Endocrine: Negative for cold intolerance, heat intolerance and polydipsia.   Genitourinary: Negative for difficulty urinating, dysuria and hematuria.   Musculoskeletal:  "Positive for arthralgias and myalgias. Negative for joint swelling.   Skin: Negative for rash and wound.   Allergic/Immunologic: Negative for environmental allergies.   Neurological: Negative for dizziness, syncope and numbness.   Hematological: Does not bruise/bleed easily.   Psychiatric/Behavioral: Negative for dysphoric mood and sleep disturbance. The patient is not nervous/anxious.            Objective:  Vitals:    10/11/19 1017   Weight: 92.1 kg (203 lb)   Height: 165 cm (64.96\")         10/11/19  1017   Weight: 92.1 kg (203 lb)     Body mass index is 33.82 kg/m².  Physical Exam    Vital signs reviewed.   General: No acute distress, alert and oriented  Eyes: conjunctiva clear; pupils equally round and reactive  ENT: external ears and nose atraumatic; oropharynx clear  CV: no peripheral edema  Resp: normal respiratory effort  Skin: no rashes or wounds; normal turgor  Psych: mood and affect appropriate; recent and remote memory intact          Ortho Exam       Right Knee-    ROM 0-125 degrees  4/5 on flexion  4/5 on extension  Maximal tenderness medial joint line and medial and lateral patellar facet    Effusion- minimal   Anterior drawer- negative  Posterior drawer- negative     Grade 1A Lachman  No opening on varus and valgus stress at 0, mild tenderness over proximal MCL with mild opening on valgus stress at 30 degrees  Active patellar compression test- positive       Log roll-  negative  Stinchfield-  negative    J-sign- not done    Positive sensation light tough all distributions symmetric to contralateral side  Brisk cap refill all digits  1 + dorsalis pedis pulse      Imaging:  Xr Knee 1 Or 2 View Right    Result Date: 9/16/2019  Impression: Negative right knee.  This report was finalized on 9/16/2019 2:17 PM by Dr. Henry Mendiola MD.      Xr Tibia Fibula 2 View Right    Result Date: 9/16/2019  Impression: There may be some lateral soft tissue swelling of the ankle. Otherwise study is normal.  This report was " finalized on 9/16/2019 2:18 PM by Dr. Henry Mendiola MD.      Xr Ankle 3+ View Right    Result Date: 9/20/2019  Impression: Soft tissue swelling. Right ankle series is otherwise negative.  This report was finalized on 9/20/2019 1:33 PM by Dr. Jacques Bates MD.      Xr Foot 3+ View Right    Result Date: 9/20/2019  Impression: Soft tissue swelling. Right foot series is otherwise negative.  This report was finalized on 9/20/2019 1:34 PM by Dr. Jacques Bates MD.      Mri Knee Right Without Contrast    Result Date: 9/17/2019  Impression: Complex tear posterior horn of the medial meniscus. Lateral meniscal degeneration. Mild to moderate cartilage thinning in all 3 compartments with no focal osteochondral defects. These changes are accompanied by moderately large joint effusion and generalized soft tissue swelling around the knee. Signer Name: Casey Quiles MD  Signed: 9/17/2019 7:27 PM  Workstation Name: [id number]  [facility]    Review of x-rays as well as MRI from hospital including review of images as well as radiology report once again does indicate moderate chondral wear in all 3 compartments most significant in the medial compartment with complex tear the posterior horn medial meniscus appreciated as well.    Assessment:        1. Primary osteoarthritis of right knee           Plan:          1. Discussed treatment options at length with patient at today's visit.  At this point time given short-term improvement with cortisone injection as well as persistent pain that is causing issues, recommend consideration for Visco supplement injections.  Patient was agreeable with this plan, referral placed today for insurance approval.  We also gave patient a drytex hinged knee brace for stabilization given some slight laxity as well as pain over her MCL.  Follow-up once approval for Visco completed      Joya Singh and her family were in agreement with plan and had all questions answered.      Orders:  Orders Placed This Encounter   Procedures   • Visco Treatment       Medications:  No orders of the defined types were placed in this encounter.      Followup:  Return for viscosupplement injections.    Joya was seen today for pain, follow-up, pain, follow-up, follow-up and pain.    Diagnoses and all orders for this visit:    Primary osteoarthritis of right knee  -     Visco Treatment; Future        By signing my name here, I Gely Castillo, attest that all documentation on 10/14/19 at 10:45 PM has been prepared under the direction and in the presence of Dr. German Wylie.    I, Dr. German Wylie, personally performed the services described in this documentation, as scribed by Gely Castillo, in my presence, and it is both accurate and complete.    Dictated utilizing Dragon dictation

## 2019-10-17 ENCOUNTER — APPOINTMENT (OUTPATIENT)
Dept: LAB | Facility: HOSPITAL | Age: 79
End: 2019-10-17

## 2019-10-17 ENCOUNTER — APPOINTMENT (OUTPATIENT)
Dept: ONCOLOGY | Facility: HOSPITAL | Age: 79
End: 2019-10-17

## 2019-10-17 ENCOUNTER — TELEPHONE (OUTPATIENT)
Dept: ORTHOPEDIC SURGERY | Facility: CLINIC | Age: 79
End: 2019-10-17

## 2019-10-17 NOTE — TELEPHONE ENCOUNTER
Ericka Henson with [facility] would like orders for Joya Singh. She is being released from [facility] on 10/19/19.  She needs PT,OT and skilled nursing. She is also weight baring as tolerated due to knee problems.

## 2019-10-18 DIAGNOSIS — S83.231D COMPLEX TEAR OF MEDIAL MENISCUS OF RIGHT KNEE AS CURRENT INJURY, SUBSEQUENT ENCOUNTER: ICD-10-CM

## 2019-10-18 DIAGNOSIS — M17.11 PRIMARY OSTEOARTHRITIS OF RIGHT KNEE: Primary | ICD-10-CM

## 2019-10-18 PROBLEM — S83.231A COMPLEX TEAR OF MEDIAL MENISCUS OF RIGHT KNEE AS CURRENT INJURY: Status: ACTIVE | Noted: 2017-03-28

## 2019-10-22 ENCOUNTER — DOCUMENTATION (OUTPATIENT)
Dept: INTERNAL MEDICINE | Facility: CLINIC | Age: 79
End: 2019-10-22

## 2019-10-22 ENCOUNTER — TELEPHONE (OUTPATIENT)
Dept: INTERNAL MEDICINE | Facility: CLINIC | Age: 79
End: 2019-10-22

## 2019-10-22 RX ORDER — HYDROCODONE BITARTRATE AND ACETAMINOPHEN 5; 325 MG/1; MG/1
1 TABLET ORAL EVERY 6 HOURS PRN
Qty: 30 TABLET | Refills: 0 | Status: SHIPPED | OUTPATIENT
Start: 2019-10-22 | End: 2019-11-05 | Stop reason: SDUPTHER

## 2019-10-22 NOTE — PROGRESS NOTES
Patient states she was in nursing home for rehab having a meniscus tear, she was on pain medications Hydrocodone and states she went home but she is in terrible pain and will be going back to rehab but until then she would like to know if you can prescribe pain medication   Please advise

## 2019-10-22 NOTE — TELEPHONE ENCOUNTER
Needs to have hospital follow up with Dr. Mercado. Did not show to 10/1 appointment from what I can see. I will send in Hydrocodone for her to take as needed, but needs to follow up with Dr. Mercado or orthopedic surgery for her knee. I sent to [facility].

## 2019-10-23 ENCOUNTER — TELEPHONE (OUTPATIENT)
Dept: INTERNAL MEDICINE | Facility: CLINIC | Age: 79
End: 2019-10-23

## 2019-10-23 NOTE — TELEPHONE ENCOUNTER
Rogelio Patient    Patient was sent home from rehab and is having a hard time, they are trying to get her back in rehab but in the meantime Home health is asking for an order for Social Work Evaluation, Occupational therapist states she tried to get it from Dr. mancera office but they are asking PCP to do it.  Please advise

## 2019-10-24 ENCOUNTER — INFUSION (OUTPATIENT)
Dept: ONCOLOGY | Facility: HOSPITAL | Age: 79
End: 2019-10-24

## 2019-10-24 ENCOUNTER — HOSPITAL ENCOUNTER (OUTPATIENT)
Dept: INFUSION THERAPY | Facility: HOSPITAL | Age: 79
Discharge: HOME | End: 2019-10-24
Admitting: NURSE PRACTITIONER

## 2019-10-24 ENCOUNTER — APPOINTMENT (OUTPATIENT)
Dept: LAB | Facility: HOSPITAL | Age: 79
End: 2019-10-24

## 2019-10-24 VITALS
TEMPERATURE: 98.9 F | HEART RATE: 82 BPM | RESPIRATION RATE: 16 BRPM | DIASTOLIC BLOOD PRESSURE: 57 MMHG | SYSTOLIC BLOOD PRESSURE: 139 MMHG | OXYGEN SATURATION: 98 %

## 2019-10-24 DIAGNOSIS — D46.C MYELODYSPLASTIC SYNDROME WITH 5Q DELETION (HCC): ICD-10-CM

## 2019-10-24 DIAGNOSIS — D64.9 ANEMIA REQUIRING TRANSFUSIONS: ICD-10-CM

## 2019-10-24 DIAGNOSIS — D47.2 MONOCLONAL GAMMOPATHY OF UNKNOWN SIGNIFICANCE (MGUS): ICD-10-CM

## 2019-10-24 DIAGNOSIS — D64.9 ANEMIA REQUIRING TRANSFUSIONS: Primary | ICD-10-CM

## 2019-10-24 DIAGNOSIS — S83.209A CURRENT TEAR OF MENISCUS OF KNEE, UNSPECIFIED LATERALITY, UNSPECIFIED MENISCUS, UNSPECIFIED TEAR TYPE, INITIAL ENCOUNTER: Primary | ICD-10-CM

## 2019-10-24 LAB
ABO GROUP BLD: NORMAL
BASOPHILS # BLD AUTO: 0.07 10*3/MM3 (ref 0–0.2)
BASOPHILS NFR BLD AUTO: 1.4 % (ref 0–1.5)
BLD GP AB SCN SERPL QL: NEGATIVE
DEPRECATED RDW RBC AUTO: 88.6 FL (ref 37–54)
EOSINOPHIL # BLD AUTO: 0.22 10*3/MM3 (ref 0–0.4)
EOSINOPHIL NFR BLD AUTO: 4.3 % (ref 0.3–6.2)
ERYTHROCYTE [DISTWIDTH] IN BLOOD BY AUTOMATED COUNT: 22.7 % (ref 12.3–15.4)
FERRITIN SERPL-MCNC: 610 NG/ML (ref 13–150)
HCT VFR BLD AUTO: 21.7 % (ref 34–46.6)
HGB BLD-MCNC: 7.1 G/DL (ref 12–15.9)
IMM GRANULOCYTES # BLD AUTO: 0.03 10*3/MM3 (ref 0–0.05)
IMM GRANULOCYTES NFR BLD AUTO: 0.6 % (ref 0–0.5)
IRON 24H UR-MRATE: 83 MCG/DL (ref 37–145)
IRON SATN MFR SERPL: 60 % (ref 20–50)
LYMPHOCYTES # BLD AUTO: 1.95 10*3/MM3 (ref 0.7–3.1)
LYMPHOCYTES NFR BLD AUTO: 38.2 % (ref 19.6–45.3)
MCH RBC QN AUTO: 36.2 PG (ref 26.6–33)
MCHC RBC AUTO-ENTMCNC: 32.7 G/DL (ref 31.5–35.7)
MCV RBC AUTO: 110.7 FL (ref 79–97)
MONOCYTES # BLD AUTO: 0.2 10*3/MM3 (ref 0.1–0.9)
MONOCYTES NFR BLD AUTO: 3.9 % (ref 5–12)
NEUTROPHILS # BLD AUTO: 2.63 10*3/MM3 (ref 1.7–7)
NEUTROPHILS NFR BLD AUTO: 51.6 % (ref 42.7–76)
NRBC BLD AUTO-RTO: 0 /100 WBC (ref 0–0.2)
PLATELET # BLD AUTO: 355 10*3/MM3 (ref 140–450)
PMV BLD AUTO: 10.8 FL (ref 6–12)
RBC # BLD AUTO: 1.96 10*6/MM3 (ref 3.77–5.28)
RH BLD: POSITIVE
T&S EXPIRATION DATE: NORMAL
TIBC SERPL-MCNC: 139 MCG/DL (ref 298–536)
UIBC SERPL-MCNC: 56 MCG/DL (ref 112–346)
WBC NRBC COR # BLD: 5.1 10*3/MM3 (ref 3.4–10.8)

## 2019-10-24 PROCEDURE — 86334 IMMUNOFIX E-PHORESIS SERUM: CPT | Performed by: INTERNAL MEDICINE

## 2019-10-24 PROCEDURE — 83883 ASSAY NEPHELOMETRY NOT SPEC: CPT | Performed by: INTERNAL MEDICINE

## 2019-10-24 PROCEDURE — 86923 COMPATIBILITY TEST ELECTRIC: CPT

## 2019-10-24 PROCEDURE — 85025 COMPLETE CBC W/AUTO DIFF WBC: CPT | Performed by: INTERNAL MEDICINE

## 2019-10-24 PROCEDURE — A9270 NON-COVERED ITEM OR SERVICE: HCPCS | Performed by: NURSE PRACTITIONER

## 2019-10-24 PROCEDURE — 82784 ASSAY IGA/IGD/IGG/IGM EACH: CPT | Performed by: INTERNAL MEDICINE

## 2019-10-24 PROCEDURE — 82728 ASSAY OF FERRITIN: CPT | Performed by: INTERNAL MEDICINE

## 2019-10-24 PROCEDURE — P9016 RBC LEUKOCYTES REDUCED: HCPCS

## 2019-10-24 PROCEDURE — 84165 PROTEIN E-PHORESIS SERUM: CPT | Performed by: INTERNAL MEDICINE

## 2019-10-24 PROCEDURE — 86850 RBC ANTIBODY SCREEN: CPT | Performed by: NURSE PRACTITIONER

## 2019-10-24 PROCEDURE — 83540 ASSAY OF IRON: CPT | Performed by: INTERNAL MEDICINE

## 2019-10-24 PROCEDURE — 86901 BLOOD TYPING SEROLOGIC RH(D): CPT | Performed by: NURSE PRACTITIONER

## 2019-10-24 PROCEDURE — 86900 BLOOD TYPING SEROLOGIC ABO: CPT | Performed by: NURSE PRACTITIONER

## 2019-10-24 PROCEDURE — 86900 BLOOD TYPING SEROLOGIC ABO: CPT

## 2019-10-24 PROCEDURE — 63710000001 DIPHENHYDRAMINE PER 50 MG: Performed by: NURSE PRACTITIONER

## 2019-10-24 PROCEDURE — 36415 COLL VENOUS BLD VENIPUNCTURE: CPT

## 2019-10-24 PROCEDURE — 63710000001 ACETAMINOPHEN 325 MG TABLET: Performed by: NURSE PRACTITIONER

## 2019-10-24 PROCEDURE — 36430 TRANSFUSION BLD/BLD COMPNT: CPT

## 2019-10-24 PROCEDURE — 84155 ASSAY OF PROTEIN SERUM: CPT | Performed by: INTERNAL MEDICINE

## 2019-10-24 PROCEDURE — 83550 IRON BINDING TEST: CPT | Performed by: INTERNAL MEDICINE

## 2019-10-24 RX ORDER — SODIUM CHLORIDE 9 MG/ML
250 INJECTION, SOLUTION INTRAVENOUS AS NEEDED
Status: DISCONTINUED | OUTPATIENT
Start: 2019-10-24 | End: 2019-10-26 | Stop reason: HOSPADM

## 2019-10-24 RX ORDER — DIPHENHYDRAMINE HCL 25 MG
25 CAPSULE ORAL ONCE
Status: CANCELLED | OUTPATIENT
Start: 2019-10-24 | End: 2019-10-24

## 2019-10-24 RX ORDER — ACETAMINOPHEN 325 MG/1
650 TABLET ORAL ONCE
Status: CANCELLED | OUTPATIENT
Start: 2019-10-24 | End: 2019-10-24

## 2019-10-24 RX ORDER — DIPHENHYDRAMINE HCL 25 MG
25 CAPSULE ORAL ONCE
Status: COMPLETED | OUTPATIENT
Start: 2019-10-24 | End: 2019-10-24

## 2019-10-24 RX ORDER — SODIUM CHLORIDE 9 MG/ML
INJECTION, SOLUTION INTRAVENOUS
Status: COMPLETED
Start: 2019-10-24 | End: 2019-10-24

## 2019-10-24 RX ORDER — SODIUM CHLORIDE 9 MG/ML
250 INJECTION, SOLUTION INTRAVENOUS AS NEEDED
Status: CANCELLED | OUTPATIENT
Start: 2019-10-24

## 2019-10-24 RX ORDER — ACETAMINOPHEN 325 MG/1
650 TABLET ORAL ONCE
Status: COMPLETED | OUTPATIENT
Start: 2019-10-24 | End: 2019-10-24

## 2019-10-24 RX ADMIN — ACETAMINOPHEN 650 MG: 325 TABLET, FILM COATED ORAL at 11:20

## 2019-10-24 RX ADMIN — SODIUM CHLORIDE 250 ML: 9 INJECTION, SOLUTION INTRAVENOUS at 13:52

## 2019-10-24 RX ADMIN — DIPHENHYDRAMINE HYDROCHLORIDE 25 MG: 25 CAPSULE ORAL at 11:20

## 2019-10-24 NOTE — PATIENT INSTRUCTIONS
Call Dr. Elieser Headley, [facility] at [phone]   Blood Transfusion, Adult, Care After  This sheet gives you information about how to care for yourself after your procedure. Your health care provider may also give you more specific instructions. If you have problems or questions, contact your health care provider.  What can I expect after the procedure?  After your procedure, it is common to have:  · Bruising and soreness where the IV tube was inserted.  · Headache.  Follow these instructions at home:    · Take over-the-counter and prescription medicines only as told by your health care provider.  · Return to your normal activities as told by your health care provider.  · Follow instructions from your health care provider about how to take care of your IV insertion site. Make sure you:  ? Wash your hands with soap and water before you change your bandage (dressing). If soap and water are not available, use hand .  ? Change your dressing as told by your health care provider.  · Check your IV insertion site every day for signs of infection. Check for:  ? More redness, swelling, or pain.  ? More fluid or blood.  ? Warmth.  ? Pus or a bad smell.  Contact a health care provider if:  · You have more redness, swelling, or pain around the IV insertion site.  · You have more fluid or blood coming from the IV insertion site.  · Your IV insertion site feels warm to the touch.  · You have pus or a bad smell coming from the IV insertion site.  · Your urine turns pink, red, or brown.  · You feel weak after doing your normal activities.  Get help right away if:  · You have signs of a serious allergic or immune system reaction, including:  ? Itchiness.  ? Hives.  ? Trouble breathing.  ? Anxiety.  ? Chest or lower back pain.  ? Fever, flushing, and chills.  ? Rapid pulse.  ? Rash.  ? Diarrhea.  ? Vomiting.  ? Dark urine.  ? Serious headache.  ? Dizziness.  ? Stiff neck.  ? Yellow coloration of the face or the white  "parts of the eyes (jaundice).  This information is not intended to replace advice given to you by your health care provider. Make sure you discuss any questions you have with your health care provider.  Document Released: 01/08/2016 Document Revised: 08/16/2017 Document Reviewed: 07/03/2017  Create Interactive Patient Education © 2019 [employer]   if you have any problems or concerns.    We know you have a Choice in healthcare and appreciate you using [facility].  Our purpose is to provide you \"Excellent Care\".  We hope that you will always choose us in the future and continue to recommend us to your family and friends.              "

## 2019-10-24 NOTE — NURSING NOTE
1620 PT. IN ACC TODAY FOR 2 UNITS PRBC'S. 2 UNITS GIVEN WITHOUT S/S OF REACTION. AVS PRINTED & REVIEWED WITH PATIENT, VERBALIZES UNDERSTANDING.DISCHARGED VIA W/C WITH FAMILY.

## 2019-10-24 NOTE — TELEPHONE ENCOUNTER
Per Christian  rep, referral needed to assist family in arranging and financing PT/OT at home.  Referral entered.  Christian will handle.

## 2019-10-24 NOTE — PROGRESS NOTES
HGB 7.1 today.  Pt stated she  has been in rehab for the past month and has not received procrit injections.  Pt reports feeling extreme fatigue.  Two units of PRBC's ordered.  Pt typed and screen and blood bank arm band placed on patient. Pt taken to [facility] per wheelchair for transfusion today.

## 2019-10-25 LAB
ABO + RH BLD: NORMAL
ABO + RH BLD: NORMAL
ALBUMIN SERPL-MCNC: 3.5 G/DL (ref 2.9–4.4)
ALBUMIN/GLOB SERPL: 1.3 {RATIO} (ref 0.7–1.7)
ALPHA1 GLOB FLD ELPH-MCNC: 0.3 G/DL (ref 0–0.4)
ALPHA2 GLOB SERPL ELPH-MCNC: 0.6 G/DL (ref 0.4–1)
B-GLOBULIN SERPL ELPH-MCNC: 0.9 G/DL (ref 0.7–1.3)
BH BB BLOOD EXPIRATION DATE: NORMAL
BH BB BLOOD EXPIRATION DATE: NORMAL
BH BB BLOOD TYPE BARCODE: 1700
BH BB BLOOD TYPE BARCODE: 1700
BH BB DISPENSE STATUS: NORMAL
BH BB DISPENSE STATUS: NORMAL
BH BB PRODUCT CODE: NORMAL
BH BB PRODUCT CODE: NORMAL
BH BB UNIT NUMBER: NORMAL
BH BB UNIT NUMBER: NORMAL
GAMMA GLOB SERPL ELPH-MCNC: 1 G/DL (ref 0.4–1.8)
GLOBULIN SER CALC-MCNC: 2.8 G/DL (ref 2.2–3.9)
IGA SERPL-MCNC: 491 MG/DL (ref 64–422)
IGG SERPL-MCNC: 930 MG/DL (ref 700–1600)
IGM SERPL-MCNC: 171 MG/DL (ref 26–217)
KAPPA LC SERPL-MCNC: 93.8 MG/L (ref 3.3–19.4)
KAPPA LC/LAMBDA SER: 2.08 {RATIO} (ref 0.26–1.65)
LAMBDA LC FREE SERPL-MCNC: 45.2 MG/L (ref 5.7–26.3)
Lab: NORMAL
M-SPIKE: NORMAL G/DL
PROT PATTERN SERPL IFE-IMP: ABNORMAL
PROT SERPL-MCNC: 6.3 G/DL (ref 6–8.5)
UNIT  ABO: NORMAL
UNIT  ABO: NORMAL
UNIT  RH: NORMAL
UNIT  RH: NORMAL

## 2019-10-28 ENCOUNTER — TELEPHONE (OUTPATIENT)
Dept: INTERNAL MEDICINE | Facility: CLINIC | Age: 79
End: 2019-10-28

## 2019-10-28 DIAGNOSIS — I63.9 CEREBROVASCULAR ACCIDENT (CVA), UNSPECIFIED MECHANISM (HCC): ICD-10-CM

## 2019-10-28 DIAGNOSIS — D46.C MYELODYSPLASTIC SYNDROME WITH 5Q DELETION (HCC): Primary | ICD-10-CM

## 2019-10-28 DIAGNOSIS — Z91.81 HISTORY OF FALLING: ICD-10-CM

## 2019-10-28 DIAGNOSIS — R26.2 DIFFICULTY WALKING: ICD-10-CM

## 2019-10-28 NOTE — TELEPHONE ENCOUNTER
Patient already has home health coming out per dr. Wylie. Called [facility] home health to verify this and confirmed OT is actually out there today to assist patient with all ADL's.

## 2019-10-30 ENCOUNTER — CLINICAL SUPPORT (OUTPATIENT)
Dept: ORTHOPEDIC SURGERY | Facility: CLINIC | Age: 79
End: 2019-10-30

## 2019-10-30 DIAGNOSIS — M17.11 PRIMARY OSTEOARTHRITIS OF RIGHT KNEE: ICD-10-CM

## 2019-10-30 DIAGNOSIS — R52 PAIN: Primary | ICD-10-CM

## 2019-10-30 PROCEDURE — 20610 DRAIN/INJ JOINT/BURSA W/O US: CPT | Performed by: NURSE PRACTITIONER

## 2019-10-30 NOTE — PROGRESS NOTES
Procedure   Large Joint Arthrocentesis  Date/Time: 10/30/2019 9:23 AM  Consent given by: patient  Site marked: site marked  Timeout: Immediately prior to procedure a time out was called to verify the correct patient, procedure, equipment, support staff and site/side marked as required   Supporting Documentation  Indications: pain   Procedure Details  Location: knee - Knee joint: right.  Preparation: Patient was prepped and draped in the usual sterile fashion  Needle size: 22 G  Approach: anterolateral  Medications administered: 30 mg Hyaluronan 30 MG/2ML  Patient tolerance: patient tolerated the procedure well with no immediate complications          Patient presents to clinic today for right knee viscosupplement injections.  This is the first injection of the series.  I explained details of injections as well as risks, benefits and alternatives with the patient today, had all questions answered, wished to proceed with injections.  I will see patient back in 1 week for repeat injection.  Patient was instructed to watch for signs or symptoms of infection including redness, swelling, warmth to the touch, or significant increased pain and to contact our office immediately if any of these issues were noted.

## 2019-10-31 ENCOUNTER — INFUSION (OUTPATIENT)
Dept: ONCOLOGY | Facility: HOSPITAL | Age: 79
End: 2019-10-31

## 2019-10-31 ENCOUNTER — APPOINTMENT (OUTPATIENT)
Dept: LAB | Facility: HOSPITAL | Age: 79
End: 2019-10-31

## 2019-10-31 VITALS
SYSTOLIC BLOOD PRESSURE: 125 MMHG | TEMPERATURE: 98.4 F | HEART RATE: 70 BPM | OXYGEN SATURATION: 96 % | DIASTOLIC BLOOD PRESSURE: 76 MMHG

## 2019-10-31 DIAGNOSIS — E11.22 CKD STAGE 3 DUE TO TYPE 2 DIABETES MELLITUS (HCC): ICD-10-CM

## 2019-10-31 DIAGNOSIS — M19.012 PRIMARY LOCALIZED OSTEOARTHROSIS OF LEFT SHOULDER REGION: Primary | ICD-10-CM

## 2019-10-31 DIAGNOSIS — D46.C MYELODYSPLASTIC SYNDROME WITH 5Q DELETION (HCC): Primary | ICD-10-CM

## 2019-10-31 DIAGNOSIS — N18.30 CKD STAGE 3 DUE TO TYPE 2 DIABETES MELLITUS (HCC): ICD-10-CM

## 2019-10-31 DIAGNOSIS — D64.9 ANEMIA REQUIRING TRANSFUSIONS: ICD-10-CM

## 2019-10-31 LAB
BASOPHILS # BLD AUTO: 0.1 10*3/MM3 (ref 0–0.2)
BASOPHILS NFR BLD AUTO: 1.8 % (ref 0–1.5)
DEPRECATED RDW RBC AUTO: 82.2 FL (ref 37–54)
EOSINOPHIL # BLD AUTO: 0.3 10*3/MM3 (ref 0–0.4)
EOSINOPHIL NFR BLD AUTO: 5.3 % (ref 0.3–6.2)
ERYTHROCYTE [DISTWIDTH] IN BLOOD BY AUTOMATED COUNT: 22.5 % (ref 12.3–15.4)
HCT VFR BLD AUTO: 28.6 % (ref 34–46.6)
HGB BLD-MCNC: 9.5 G/DL (ref 12–15.9)
IMM GRANULOCYTES # BLD AUTO: 0.07 10*3/MM3 (ref 0–0.05)
IMM GRANULOCYTES NFR BLD AUTO: 1.2 % (ref 0–0.5)
LYMPHOCYTES # BLD AUTO: 2.43 10*3/MM3 (ref 0.7–3.1)
LYMPHOCYTES NFR BLD AUTO: 42.9 % (ref 19.6–45.3)
MCH RBC QN AUTO: 33.9 PG (ref 26.6–33)
MCHC RBC AUTO-ENTMCNC: 33.2 G/DL (ref 31.5–35.7)
MCV RBC AUTO: 102.1 FL (ref 79–97)
MONOCYTES # BLD AUTO: 0.23 10*3/MM3 (ref 0.1–0.9)
MONOCYTES NFR BLD AUTO: 4.1 % (ref 5–12)
NEUTROPHILS # BLD AUTO: 2.53 10*3/MM3 (ref 1.7–7)
NEUTROPHILS NFR BLD AUTO: 44.7 % (ref 42.7–76)
NRBC BLD AUTO-RTO: 0 /100 WBC (ref 0–0.2)
PLATELET # BLD AUTO: 369 10*3/MM3 (ref 140–450)
PMV BLD AUTO: 11.1 FL (ref 6–12)
RBC # BLD AUTO: 2.8 10*6/MM3 (ref 3.77–5.28)
WBC NRBC COR # BLD: 5.66 10*3/MM3 (ref 3.4–10.8)

## 2019-10-31 PROCEDURE — 25010000002 EPOETIN ALFA PER 1000 UNITS: Performed by: INTERNAL MEDICINE

## 2019-10-31 PROCEDURE — 96372 THER/PROPH/DIAG INJ SC/IM: CPT

## 2019-10-31 PROCEDURE — 36415 COLL VENOUS BLD VENIPUNCTURE: CPT

## 2019-10-31 PROCEDURE — 85025 COMPLETE CBC W/AUTO DIFF WBC: CPT | Performed by: INTERNAL MEDICINE

## 2019-10-31 RX ORDER — HYALURONATE SODIUM 30 MG/2 ML
SYRINGE (ML) INTRAARTICULAR
COMMUNITY
Start: 2019-10-22 | End: 2020-04-09

## 2019-10-31 RX ADMIN — ERYTHROPOIETIN 60000 UNITS: 40000 INJECTION, SOLUTION INTRAVENOUS; SUBCUTANEOUS at 09:44

## 2019-11-01 ENCOUNTER — TELEPHONE (OUTPATIENT)
Dept: INTERNAL MEDICINE | Facility: CLINIC | Age: 79
End: 2019-11-01

## 2019-11-01 RX ORDER — ONDANSETRON HYDROCHLORIDE 8 MG/1
8 TABLET, FILM COATED ORAL EVERY 8 HOURS PRN
Qty: 15 TABLET | Refills: 0 | Status: SHIPPED | OUTPATIENT
Start: 2019-11-01 | End: 2019-12-10 | Stop reason: SDUPTHER

## 2019-11-01 NOTE — TELEPHONE ENCOUNTER
PATIENT CALLED FEELING VERY NAUSEATED. CAN SOMETHING BE CALLED IN FOR HER, NOT PHENERGAN.    [facility] IN [place].

## 2019-11-05 ENCOUNTER — TELEPHONE (OUTPATIENT)
Dept: INTERNAL MEDICINE | Facility: CLINIC | Age: 79
End: 2019-11-05

## 2019-11-05 DIAGNOSIS — D46.C MYELODYSPLASTIC SYNDROME WITH 5Q DELETION (HCC): Primary | ICD-10-CM

## 2019-11-05 DIAGNOSIS — M19.012 PRIMARY LOCALIZED OSTEOARTHROSIS OF LEFT SHOULDER REGION: ICD-10-CM

## 2019-11-05 DIAGNOSIS — Z91.81 HISTORY OF FALLING: ICD-10-CM

## 2019-11-05 DIAGNOSIS — R26.2 DIFFICULTY WALKING: ICD-10-CM

## 2019-11-05 DIAGNOSIS — N39.0 RECURRENT UTI: ICD-10-CM

## 2019-11-05 RX ORDER — HYDROCODONE BITARTRATE AND ACETAMINOPHEN 5; 325 MG/1; MG/1
1 TABLET ORAL EVERY 6 HOURS PRN
Qty: 60 TABLET | Refills: 0 | Status: ON HOLD | OUTPATIENT
Start: 2019-11-05 | End: 2020-07-29

## 2019-11-05 NOTE — TELEPHONE ENCOUNTER
11-05-19 Melanie Wan Nurse Care Manager at [phone]  Is caller.  Patient has home health with [facility], but not nursing.  Requesting order from PCP for Nursing Care and U/A to check for UTI.      [facility] [phone]

## 2019-11-06 ENCOUNTER — TELEPHONE (OUTPATIENT)
Dept: INTERNAL MEDICINE | Facility: CLINIC | Age: 79
End: 2019-11-06

## 2019-11-06 ENCOUNTER — APPOINTMENT (OUTPATIENT)
Dept: LAB | Facility: HOSPITAL | Age: 79
End: 2019-11-06

## 2019-11-06 ENCOUNTER — HOSPITAL ENCOUNTER (OUTPATIENT)
Dept: ULTRASOUND IMAGING | Facility: HOSPITAL | Age: 79
End: 2019-11-06

## 2019-11-06 ENCOUNTER — HOSPITAL ENCOUNTER (OUTPATIENT)
Dept: MAMMOGRAPHY | Facility: HOSPITAL | Age: 79
End: 2019-11-06

## 2019-11-06 ENCOUNTER — APPOINTMENT (OUTPATIENT)
Dept: ONCOLOGY | Facility: CLINIC | Age: 79
End: 2019-11-06

## 2019-11-06 ENCOUNTER — APPOINTMENT (OUTPATIENT)
Dept: ONCOLOGY | Facility: HOSPITAL | Age: 79
End: 2019-11-06

## 2019-11-06 NOTE — TELEPHONE ENCOUNTER
11-6-19 patient asking if pcp can send in antibiotic for bad UTI to her pharmacy on record.  Message sent prior regarding request for nurse through home health for same condition.    Please call patient with update.

## 2019-11-07 ENCOUNTER — CLINICAL SUPPORT (OUTPATIENT)
Dept: ORTHOPEDIC SURGERY | Facility: CLINIC | Age: 79
End: 2019-11-07

## 2019-11-07 ENCOUNTER — INFUSION (OUTPATIENT)
Dept: ONCOLOGY | Facility: HOSPITAL | Age: 79
End: 2019-11-07

## 2019-11-07 ENCOUNTER — LAB REQUISITION (OUTPATIENT)
Dept: LAB | Facility: HOSPITAL | Age: 79
End: 2019-11-07

## 2019-11-07 ENCOUNTER — LAB (OUTPATIENT)
Dept: LAB | Facility: HOSPITAL | Age: 79
End: 2019-11-07

## 2019-11-07 ENCOUNTER — OFFICE VISIT (OUTPATIENT)
Dept: ONCOLOGY | Facility: CLINIC | Age: 79
End: 2019-11-07

## 2019-11-07 VITALS
DIASTOLIC BLOOD PRESSURE: 75 MMHG | TEMPERATURE: 98 F | SYSTOLIC BLOOD PRESSURE: 128 MMHG | OXYGEN SATURATION: 90 % | HEART RATE: 75 BPM

## 2019-11-07 DIAGNOSIS — E11.22 CKD STAGE 3 DUE TO TYPE 2 DIABETES MELLITUS (HCC): ICD-10-CM

## 2019-11-07 DIAGNOSIS — D64.9 ANEMIA REQUIRING TRANSFUSIONS: ICD-10-CM

## 2019-11-07 DIAGNOSIS — N18.30 CKD STAGE 3 DUE TO TYPE 2 DIABETES MELLITUS (HCC): ICD-10-CM

## 2019-11-07 DIAGNOSIS — D46.C MYELODYSPLASTIC SYNDROME WITH 5Q DELETION (HCC): ICD-10-CM

## 2019-11-07 DIAGNOSIS — D64.9 ANEMIA REQUIRING TRANSFUSIONS: Primary | ICD-10-CM

## 2019-11-07 DIAGNOSIS — M17.11 UNILATERAL PRIMARY OSTEOARTHRITIS, RIGHT KNEE: ICD-10-CM

## 2019-11-07 DIAGNOSIS — R52 PAIN: Primary | ICD-10-CM

## 2019-11-07 DIAGNOSIS — M17.11 PRIMARY OSTEOARTHRITIS OF RIGHT KNEE: ICD-10-CM

## 2019-11-07 DIAGNOSIS — D46.C MYELODYSPLASTIC SYNDROME WITH 5Q DELETION (HCC): Primary | ICD-10-CM

## 2019-11-07 LAB
BACTERIA UR QL AUTO: ABNORMAL /HPF
BASOPHILS # BLD AUTO: 0.1 10*3/MM3 (ref 0–0.2)
BASOPHILS NFR BLD AUTO: 1.8 % (ref 0–1.5)
BILIRUB UR QL STRIP: NEGATIVE
CLARITY UR: ABNORMAL
COLOR UR: YELLOW
DEPRECATED RDW RBC AUTO: 83.6 FL (ref 37–54)
EOSINOPHIL # BLD AUTO: 0.31 10*3/MM3 (ref 0–0.4)
EOSINOPHIL NFR BLD AUTO: 5.6 % (ref 0.3–6.2)
ERYTHROCYTE [DISTWIDTH] IN BLOOD BY AUTOMATED COUNT: 23.1 % (ref 12.3–15.4)
GLUCOSE UR STRIP-MCNC: NEGATIVE MG/DL
GRAN CASTS URNS QL MICRO: ABNORMAL /LPF
HCT VFR BLD AUTO: 28.1 % (ref 34–46.6)
HGB BLD-MCNC: 9.2 G/DL (ref 12–15.9)
HGB UR QL STRIP.AUTO: NEGATIVE
HYALINE CASTS UR QL AUTO: ABNORMAL /LPF
IMM GRANULOCYTES # BLD AUTO: 0.07 10*3/MM3 (ref 0–0.05)
IMM GRANULOCYTES NFR BLD AUTO: 1.3 % (ref 0–0.5)
KETONES UR QL STRIP: NEGATIVE
LEUKOCYTE ESTERASE UR QL STRIP.AUTO: NEGATIVE
LYMPHOCYTES # BLD AUTO: 1.97 10*3/MM3 (ref 0.7–3.1)
LYMPHOCYTES NFR BLD AUTO: 35.6 % (ref 19.6–45.3)
MCH RBC QN AUTO: 33.7 PG (ref 26.6–33)
MCHC RBC AUTO-ENTMCNC: 32.7 G/DL (ref 31.5–35.7)
MCV RBC AUTO: 102.9 FL (ref 79–97)
MONOCYTES # BLD AUTO: 0.23 10*3/MM3 (ref 0.1–0.9)
MONOCYTES NFR BLD AUTO: 4.2 % (ref 5–12)
NEUTROPHILS # BLD AUTO: 2.86 10*3/MM3 (ref 1.7–7)
NEUTROPHILS NFR BLD AUTO: 51.5 % (ref 42.7–76)
NITRITE UR QL STRIP: NEGATIVE
NRBC BLD AUTO-RTO: 0 /100 WBC (ref 0–0.2)
PH UR STRIP.AUTO: 7 [PH] (ref 4.5–8)
PLATELET # BLD AUTO: 385 10*3/MM3 (ref 140–450)
PMV BLD AUTO: 11.4 FL (ref 6–12)
PROT UR QL STRIP: ABNORMAL
RBC # BLD AUTO: 2.73 10*6/MM3 (ref 3.77–5.28)
RBC # UR: ABNORMAL /HPF
REF LAB TEST METHOD: ABNORMAL
SP GR UR STRIP: 1.01 (ref 1–1.03)
SQUAMOUS #/AREA URNS HPF: ABNORMAL /HPF
UROBILINOGEN UR QL STRIP: ABNORMAL
WBC NRBC COR # BLD: 5.54 10*3/MM3 (ref 3.4–10.8)
WBC UR QL AUTO: ABNORMAL /HPF

## 2019-11-07 PROCEDURE — 99214 OFFICE O/P EST MOD 30 MIN: CPT | Performed by: NURSE PRACTITIONER

## 2019-11-07 PROCEDURE — 87086 URINE CULTURE/COLONY COUNT: CPT | Performed by: INTERNAL MEDICINE

## 2019-11-07 PROCEDURE — 81001 URINALYSIS AUTO W/SCOPE: CPT | Performed by: INTERNAL MEDICINE

## 2019-11-07 PROCEDURE — 96372 THER/PROPH/DIAG INJ SC/IM: CPT

## 2019-11-07 PROCEDURE — 36415 COLL VENOUS BLD VENIPUNCTURE: CPT

## 2019-11-07 PROCEDURE — 85025 COMPLETE CBC W/AUTO DIFF WBC: CPT | Performed by: INTERNAL MEDICINE

## 2019-11-07 PROCEDURE — 25010000002 EPOETIN ALFA PER 1000 UNITS: Performed by: INTERNAL MEDICINE

## 2019-11-07 PROCEDURE — 20610 DRAIN/INJ JOINT/BURSA W/O US: CPT | Performed by: NURSE PRACTITIONER

## 2019-11-07 RX ADMIN — ERYTHROPOIETIN 60000 UNITS: 40000 INJECTION, SOLUTION INTRAVENOUS; SUBCUTANEOUS at 09:47

## 2019-11-07 NOTE — PROGRESS NOTES
Large Joint Arthrocentesis: R knee  Date/Time: 11/7/2019 10:33 AM  Consent given by: patient  Site marked: site marked  Timeout: Immediately prior to procedure a time out was called to verify the correct patient, procedure, equipment, support staff and site/side marked as required   Supporting Documentation  Indications: pain   Procedure Details  Location: knee - R knee  Preparation: Patient was prepped and draped in the usual sterile fashion  Needle size: 22 G  Approach: anterolateral  Medications administered: 30 mg Hyaluronan 30 MG/2ML  Patient tolerance: patient tolerated the procedure well with no immediate complications        Patient presents to clinic today for right knee viscosupplement injections.  This is the second injection of the series.  I explained details of injections as well as risks, benefits and alternatives with the patient today, had all questions answered, wished to proceed with injections.  I will see patient back in 1 week for repeat injection.  Patient was instructed to watch for signs or symptoms of infection including redness, swelling, warmth to the touch, or significant increased pain and to contact our office immediately if any of these issues were noted.

## 2019-11-07 NOTE — PROGRESS NOTES
Subjective     REASON FOR FOLLOW-UP:   1.  Macrocytic anemia secondary to myelodysplastic syndrome with 5 q. minus and chronic kidney disease  2.  Monoclonal gammopathy of undetermined significance                             REQUESTING PHYSICIAN:  Dr. Baugh      History of Present Illness   The patient return today for follow-up.  She complains of fatigue, shortness of breath and lightheadedness.  She also complains of left leg swelling which is been evaluated by her PCP.  She denies bruising or bleeding.  She denies fever.      Hematology History:  Patient presented as 78-year-old woman for evaluation of anemia.  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  She has stage III chronic kidney disease followed by Dr. Garza of nephrology.  Reviewing her records, the patient has had anemia present since at least 2014 but her hemoglobin has worsened over the past 6 months.  The patient was admitted to the hospital in October 2018 with symptomatic anemia, shortness of breath and lightheadedness.  She was found to have hemoglobin of 7.0.  There was concern for GI blood loss although EGD and colonoscopy performed showed no obvious etiology of blood loss.  The patient states that she was transfused 7 units of packed red blood cells during the hospital stay.  I do not see any Hemoccult results.  She was previously on Eliquis which was discontinued.  Since discharge in October, the patient has been receiving weekly Procrit 20,000 units in the ACU at [place], but despite Procrit she has required transfusion on 2 separate occasions.  She is not seeing any bright red blood per rectum or melena.  She complains of fatigue and lightheadedness.    Recent iron profile on 1/17/19 was inconsistent with iron deficiency with an iron saturation 68% and the ferritin was 352.  The red blood cells are macrocytic.  White blood cell and platelet counts have  been normal.    The patient was seen in hematology on 1/29/19 and additional evaluation performed.  The reticulocyte count was elevated 3.72% but the haptoglobin and LDH were both normal arguing against a hemolytic process.  B12 and folic acid levels were normal.  Serum protein electrophoresis showed no M spike but the immunofixation identified a small IgA monoclonal protein with lambda specificity; IgA level 544.  Sedimentation rate elevated 58.  A free light chain ratio from the serum was normal 1.64.    The patient was referred for a bone marrow exam performed on 3/6/2019 which showed a cellularity of 35%.  There was erythroid hyperplasia with megaloblastoid changes, normal myeloid maturation, increased megakaryocytes with frequent micro megakaryocytes, scattered lymphoid aggregates.  There were morphologically normal plasma cells increased in #2 8% of the cellularity.  There were no increased blasts.  No increase in iron stores.  Karyotype showed a deletion 5 q. and 19 of 20 cells analyzed.        Past Medical History:   Diagnosis Date   • Allergic rhinitis    • Anemia    • Anxiety    • Appetite absent    • Arthritis    • Asthma    • Back pain    • Bell's palsy    • Black tarry stools    • Blood in stool    • Chronic fatigue    • CKD (chronic kidney disease) stage 3, GFR 30-59 ml/min (CMS/HCC)    • Community acquired pneumonia of left lung 10/25/2018   • Cough    • Depression    • Diabetes mellitus (CMS/[facility])     LAST A1C 6   • Diabetic gastroparesis (CMS/[facility]) 2/19/2016   • Difficulty walking    • Excessive urination at night    • Frequent urination    • GERD (gastroesophageal reflux disease)    • GI bleed    • Gout    • H/O blood clots     LEFT LEG 7 OR 8 YEARS AGO   • Heat intolerance    • History of fall 10/2018   • History of prior pregnancies     x8, miscarriage 5   • History of transfusion 11/2018    due to anemia   • Hyperlipidemia    • Hypertension    • Hypothyroidism    • Normal coronary arteries     by  cath 2013   • Orthostatic hypotension    • AARON (obstructive sleep apnea)     DOESNT WEAR REGULARLY   • PONV (postoperative nausea and vomiting)    • Skin cancer    • Stroke (CMS/HCC)     Several mini-strokes   • TIA (transient ischemic attack)     LAST TIA JULY 2017   • Urination pain    • UTI (urinary tract infection)     Dec 2018 and Jan 2019        Past Surgical History:   Procedure Laterality Date   • BACK SURGERY      HARDWARE   • CHOLECYSTECTOMY      OPEN   • COLONOSCOPY  2011    due for repeat in 2021   • COLONOSCOPY N/A 10/28/2018    Procedure: COLONOSCOPY;  Surgeon: Emmanuel Rogers MD;  Location: [facility] OR;  Service: Gastroenterology   • ENDOSCOPY N/A 10/26/2018    Procedure: ESOPHAGOGASTRODUODENOSCOPY;  Surgeon: Emmanuel Rogers MD;  Location: [facility] OR;  Service: Gastroenterology   • HYSTERECTOMY      PARTIAL    • KNEE SURGERY     • NECK SURGERY     • SPINE SURGERY     • TUMOR REMOVAL Left     Leg   • UPPER GASTROINTESTINAL ENDOSCOPY  2014    gastritis.  done by dr. hastings        Current Outpatient Medications on File Prior to Visit   Medication Sig Dispense Refill   • ACCU-CHEK FASTCLIX LANCETS misc TEST 3-4 TIMES DAILY AS DIRECTED 400 each 3   • ACCU-CHEK SMARTVIEW test strip TEST BLOOD SUGAR THREE TIMES DAILY OR AS DIRECTED 300 each 3   • acetaminophen (TYLENOL) 325 MG tablet Take 2 tablets by mouth Every 6 (Six) Hours As Needed for Mild Pain . OTC product 40 tablet 0   • albuterol sulfate  (90 Base) MCG/ACT inhaler Inhale 2 puffs Every 4 (Four) Hours As Needed for Wheezing. 1 inhaler 3   • Alcohol Swabs (B-D SINGLE USE SWABS REGULAR) pads      • atorvastatin (LIPITOR) 10 MG tablet TAKE ONE TABLET BY MOUTH AT BEDTIME 90 tablet 1   • benzonatate (TESSALON) 200 MG capsule TAKE ONE CAPSULE BY MOUTH THREE TIMES DAILY AS NEEDED FOR COUGH 20 capsule 0   • Blood Glucose Monitoring Suppl (ACCU-CHEK KENNETH SMARTVIEW) w/Device kit TEST blood sugar three times daily or as directed 1  kit 0   • Calcium Carbonate-Vit D-Min (CALCIUM 1200) 7650-9951 MG-UNIT chewable tablet Chew 1 tablet Daily.     • cholecalciferol 2000 units tablet Take 2,000 Units by mouth Daily.     • cyclobenzaprine (FLEXERIL) 10 MG tablet TAKE ONE TABLET BY MOUTH TWICE DAILY as needed for muscle spasms 30 tablet 0   • desvenlafaxine (PRISTIQ) 50 MG 24 hr tablet Take 1 tablet by mouth Daily.     • diphenhydrAMINE (BENADRYL) 25 mg capsule Take 25 mg by mouth Every 6 (Six) Hours As Needed for Itching.     • docusate sodium (COLACE) 100 MG capsule Take 1 capsule by mouth 2 (Two) Times a Day.     • epoetin chu 31978 UNIT/ML solution 20,000 Units, epoetin chu 29975 UNIT/ML solution 40,000 Units Inject 60,000 Units under the skin into the appropriate area as directed 1 (One) Time Per Week.     • fluticasone (FLONASE) 50 MCG/ACT nasal spray 2 sprays into the nostril(s) as directed by provider Daily. 16 g 0   • furosemide (LASIX) 20 MG tablet TAKE ONE (1) TABLET ORALLY (BY MOUTH) ONCE DAILY 90 tablet 1   • HYDROcodone-acetaminophen (NORCO) 5-325 MG per tablet Take 1 tablet by mouth Every 6 (Six) Hours As Needed for Moderate Pain . 60 tablet 0   • insulin aspart (novoLOG) 100 UNIT/ML injection Inject 15 Units under the skin into the appropriate area as directed 3 (Three) Times a Day With Meals.  12   • insulin aspart (novoLOG) 100 UNIT/ML injection Inject 0-9 Units under the skin into the appropriate area as directed 4 (Four) Times a Day Before Meals & at Bedtime.  12   • Insulin Degludec (TRESIBA FLEXTOUCH) 200 UNIT/ML solution pen-injector Inject 54 Units under the skin into the appropriate area as directed every night at bedtime. 9 mL 11   • lactobacillus acidophilus (RISAQUAD) capsule capsule Take 1 capsule by mouth Daily.     • levothyroxine (SYNTHROID, LEVOTHROID) 112 MCG tablet Take 1 tablet by mouth Every Morning.     • melatonin 5 MG tablet tablet Take 1 tablet by mouth At Night As Needed (insomnia).     • midodrine  "(PROAMATINE) 2.5 MG tablet Take 1 tablet by mouth 3 (Three) Times a Day. 90 tablet 6   • Multiple Vitamins-Minerals (CENTRUM SILVER PO) Take  by mouth Daily.     • Needle, Disp, (BD DISP NEEDLES) 30G X 1/2\" misc To be used 3 times daily with Novolog Flexpen. 100 each 5   • NYSTATIN 305824 UNIT/GM powder APPLY TOPICALLY 3 TIMES A DAY 60 g 2   • O2 (OXYGEN) Inhale 2 L/min Every Night. Uses 2L  overnight only     • omeprazole (priLOSEC) 40 MG capsule TAKE ONE CAPSULE BY MOUTH EVERY DAY 90 capsule 1   • ondansetron (ZOFRAN) 8 MG tablet Take 1 tablet by mouth Every 8 (Eight) Hours As Needed for Nausea or Vomiting. 15 tablet 0   • ORTHOVISC 30 MG/2ML solution prefilled syringe injection      • polyethylene glycol (MIRALAX) pack packet Take 17 g by mouth Daily.     • potassium chloride (K-DUR) 10 MEQ CR tablet TAKE ONE (1) TABLET ORALLY (BY MOUTH) ONCE DAILY 90 tablet 2   • promethazine (PHENERGAN) 12.5 MG tablet TABLET ONE-HALF TABLET TO ONE TABLET BY MOUTH EVERY 6 HOURS AS NEEDED FOR NAUSEA 20 tablet 0   • ULTICARE MINI PEN NEEDLES 31G X 6 MM misc USE TO INJECT INSULINS 4 TIMES DAILY AS DIRECTED 400 each 3     No current facility-administered medications on file prior to visit.         ALLERGIES:    Allergies   Allergen Reactions   • Baclofen Anxiety     Panic attack, nightmares   • Eliquis [Apixaban] Anaphylaxis   • Codeine Itching and Rash   • Lisinopril Cough   • Morphine Hives   • Penicillins Rash     Tolerates cephalosporins         Social History     Socioeconomic History   • Marital status:      Spouse name: Not on file   • Number of children: 3   • Years of education: High School   • Highest education level: Not on file   Occupational History     Employer: RETIRED   Tobacco Use   • Smoking status: Never Smoker   • Smokeless tobacco: Never Used   • Tobacco comment: CAFFEINE USE: NONE   Substance and Sexual Activity   • Alcohol use: No   • Drug use: No   • Sexual activity: Defer     Comment: EXERCISE - " RARELY        Family History   Problem Relation Age of Onset   • Lupus Mother    • Heart failure Mother 59   • Heart disease Other    • Hypertension Other    • Heart attack Father    • Breast cancer Neg Hx         Review of Systems   Constitutional: Positive for activity change and fatigue. Negative for appetite change, fever and unexpected weight change.   HENT: Negative for congestion.    Respiratory: Negative for apnea, cough and shortness of breath.    Gastrointestinal: Negative for abdominal pain, blood in stool, nausea and vomiting.   Genitourinary: Negative for frequency and urgency.   Musculoskeletal: Positive for arthralgias, back pain and gait problem. Negative for neck stiffness.   Skin: Negative.    Neurological: Positive for light-headedness and numbness. Negative for dizziness, tremors and speech difficulty.   Hematological: Negative.    Psychiatric/Behavioral: Negative.    Review of systems unchanged from previous except as noted as of 11/7/2019    Objective     Vitals:    11/07/19 0859   BP: 128/75   Pulse: 75   Temp: 98 °F (36.7 °C)   SpO2: 90%   Weight: Comment: unable to stand for weight   PainSc: 10-Worst pain ever   PainLoc: Knee  Comment: right knee and left shoulder     Current Status 11/7/2019   ECOG score 2       Physical Exam    CON: pleasant well-developed somewhat chronic ill appearing woman  HEENT: no icterus, no thrush, moist membranes  NECK: no jvd  LYMPH: No cervical or supraclavicular lymphadenopathy  CV: RRR, S1S2, no murmur  RESP: Lungs clear to auscultation bilaterally, no wheezing noted.  MUSC: No edema noted.  NEURO: alert and oriented x3, mild global weakness  PSYCH: normal mood  Physical exam unchanged from previous except as noted as of 11/7/2019    RECENT LABS:  Hematology WBC   Date Value Ref Range Status   11/07/2019 5.54 3.40 - 10.80 10*3/mm3 Final   04/23/2019 5.14 3.40 - 10.80 10*3/mm3 Final     RBC   Date Value Ref Range Status   11/07/2019 2.73 (L) 3.77 - 5.28  10*6/mm3 Final   04/23/2019 3.02 (L) 3.77 - 5.28 10*6/mm3 Final     Hemoglobin   Date Value Ref Range Status   11/07/2019 9.2 (L) 12.0 - 15.9 g/dL Final     Hematocrit   Date Value Ref Range Status   11/07/2019 28.1 (L) 34.0 - 46.6 % Final     Platelets   Date Value Ref Range Status   11/07/2019 385 140 - 450 10*3/mm3 Final          Assessment/Plan     1.  Macrocytic anemia:  BM biopsy 3/5/19 c/w myelodysplastic syndrome with deletion of 5 q.  In addition, the patient has chronic kidney disease, stage III.  She is currently doing okay on weekly Procrit with minimal transfusion support.  We will continue this plan for now.  Revlimid would be an option down the road if her transfusion requirement increases.  Her hemoglobin today is 9.2 and we will proceed with Procrit.    2.  Monoclonal gammopathy of undetermined significance: The patient's bone marrow showed slight increase in plasma cells 8% of the cellularity but normal in morphology.  She has an IgA monoclonality on her immunofixation but no measurable M spike and a normal free light chain ratio.   Repeat studies 5/16/2019 showed a stable IgA 509 with a normal light chain ratio, no M spike.  Studies performed 10/24/2019 show stable IgA at 491, no M spike with increaseing kappa/lambda rtio and kappa free light chain thought to be related to her CKD as discussed with Dr. Headley.      3.  Chronic kidney disease, stage III which contributes to anemia    Patient will proceed with weekly CBCs and possible Procrit.  We will repeat ferritin and iron panel in 2 months and follow-up with Dr. Headley in 3 months.

## 2019-11-08 ENCOUNTER — TELEPHONE (OUTPATIENT)
Dept: INTERNAL MEDICINE | Facility: CLINIC | Age: 79
End: 2019-11-08

## 2019-11-08 DIAGNOSIS — N39.0 URINARY TRACT INFECTION WITH HEMATURIA, SITE UNSPECIFIED: Primary | ICD-10-CM

## 2019-11-08 DIAGNOSIS — R31.9 URINARY TRACT INFECTION WITH HEMATURIA, SITE UNSPECIFIED: Primary | ICD-10-CM

## 2019-11-08 LAB — BACTERIA SPEC AEROBE CULT: NORMAL

## 2019-11-08 RX ORDER — CEPHALEXIN 500 MG/1
500 CAPSULE ORAL 2 TIMES DAILY
Qty: 14 CAPSULE | Refills: 0 | Status: SHIPPED | OUTPATIENT
Start: 2019-11-08 | End: 2019-11-15

## 2019-11-08 NOTE — TELEPHONE ENCOUNTER
Please advise, patient is still waiting for antibiotic to be sent in and home health is calling stating patient is misrable and needs RX sent in.

## 2019-11-14 ENCOUNTER — CLINICAL SUPPORT (OUTPATIENT)
Dept: ORTHOPEDIC SURGERY | Facility: CLINIC | Age: 79
End: 2019-11-14

## 2019-11-14 ENCOUNTER — LAB (OUTPATIENT)
Dept: LAB | Facility: HOSPITAL | Age: 79
End: 2019-11-14

## 2019-11-14 ENCOUNTER — INFUSION (OUTPATIENT)
Dept: ONCOLOGY | Facility: HOSPITAL | Age: 79
End: 2019-11-14

## 2019-11-14 VITALS — WEIGHT: 203 LBS | HEIGHT: 65 IN | BODY MASS INDEX: 33.82 KG/M2

## 2019-11-14 VITALS
OXYGEN SATURATION: 94 % | HEART RATE: 69 BPM | TEMPERATURE: 97.5 F | DIASTOLIC BLOOD PRESSURE: 70 MMHG | SYSTOLIC BLOOD PRESSURE: 113 MMHG

## 2019-11-14 DIAGNOSIS — M17.11 PRIMARY OSTEOARTHRITIS OF RIGHT KNEE: Primary | ICD-10-CM

## 2019-11-14 DIAGNOSIS — N18.30 CKD STAGE 3 DUE TO TYPE 2 DIABETES MELLITUS (HCC): Primary | ICD-10-CM

## 2019-11-14 DIAGNOSIS — D46.C MYELODYSPLASTIC SYNDROME WITH 5Q DELETION (HCC): ICD-10-CM

## 2019-11-14 DIAGNOSIS — E11.22 CKD STAGE 3 DUE TO TYPE 2 DIABETES MELLITUS (HCC): Primary | ICD-10-CM

## 2019-11-14 DIAGNOSIS — D64.9 ANEMIA REQUIRING TRANSFUSIONS: ICD-10-CM

## 2019-11-14 DIAGNOSIS — N18.30 CKD STAGE 3 DUE TO TYPE 2 DIABETES MELLITUS (HCC): ICD-10-CM

## 2019-11-14 DIAGNOSIS — E11.22 CKD STAGE 3 DUE TO TYPE 2 DIABETES MELLITUS (HCC): ICD-10-CM

## 2019-11-14 DIAGNOSIS — F41.9 ANXIETY AND DEPRESSION: ICD-10-CM

## 2019-11-14 DIAGNOSIS — F32.A ANXIETY AND DEPRESSION: ICD-10-CM

## 2019-11-14 LAB
BASOPHILS # BLD AUTO: 0.08 10*3/MM3 (ref 0–0.2)
BASOPHILS NFR BLD AUTO: 1.4 % (ref 0–1.5)
DEPRECATED RDW RBC AUTO: 84.4 FL (ref 37–54)
EOSINOPHIL # BLD AUTO: 0.34 10*3/MM3 (ref 0–0.4)
EOSINOPHIL NFR BLD AUTO: 5.8 % (ref 0.3–6.2)
ERYTHROCYTE [DISTWIDTH] IN BLOOD BY AUTOMATED COUNT: 23.5 % (ref 12.3–15.4)
HCT VFR BLD AUTO: 25.7 % (ref 34–46.6)
HGB BLD-MCNC: 8.6 G/DL (ref 12–15.9)
IMM GRANULOCYTES # BLD AUTO: 0.08 10*3/MM3 (ref 0–0.05)
IMM GRANULOCYTES NFR BLD AUTO: 1.4 % (ref 0–0.5)
LYMPHOCYTES # BLD AUTO: 2.17 10*3/MM3 (ref 0.7–3.1)
LYMPHOCYTES NFR BLD AUTO: 37.2 % (ref 19.6–45.3)
MCH RBC QN AUTO: 34.3 PG (ref 26.6–33)
MCHC RBC AUTO-ENTMCNC: 33.5 G/DL (ref 31.5–35.7)
MCV RBC AUTO: 102.4 FL (ref 79–97)
MONOCYTES # BLD AUTO: 0.28 10*3/MM3 (ref 0.1–0.9)
MONOCYTES NFR BLD AUTO: 4.8 % (ref 5–12)
NEUTROPHILS # BLD AUTO: 2.89 10*3/MM3 (ref 1.7–7)
NEUTROPHILS NFR BLD AUTO: 49.4 % (ref 42.7–76)
NRBC BLD AUTO-RTO: 0 /100 WBC (ref 0–0.2)
PLATELET # BLD AUTO: 384 10*3/MM3 (ref 140–450)
PMV BLD AUTO: 11.7 FL (ref 6–12)
RBC # BLD AUTO: 2.51 10*6/MM3 (ref 3.77–5.28)
WBC NRBC COR # BLD: 5.84 10*3/MM3 (ref 3.4–10.8)

## 2019-11-14 PROCEDURE — 85025 COMPLETE CBC W/AUTO DIFF WBC: CPT | Performed by: INTERNAL MEDICINE

## 2019-11-14 PROCEDURE — 36415 COLL VENOUS BLD VENIPUNCTURE: CPT

## 2019-11-14 PROCEDURE — 25010000002 EPOETIN ALFA PER 1000 UNITS: Performed by: NURSE PRACTITIONER

## 2019-11-14 PROCEDURE — 20610 DRAIN/INJ JOINT/BURSA W/O US: CPT | Performed by: NURSE PRACTITIONER

## 2019-11-14 PROCEDURE — 96372 THER/PROPH/DIAG INJ SC/IM: CPT | Performed by: NURSE PRACTITIONER

## 2019-11-14 RX ORDER — LEVOTHYROXINE SODIUM 88 UG/1
TABLET ORAL
Qty: 90 TABLET | Refills: 1 | OUTPATIENT
Start: 2019-11-14

## 2019-11-14 RX ORDER — OMEPRAZOLE 40 MG/1
CAPSULE, DELAYED RELEASE ORAL
Qty: 90 CAPSULE | Refills: 1 | OUTPATIENT
Start: 2019-11-14

## 2019-11-14 RX ORDER — DESVENLAFAXINE SUCCINATE 50 MG/1
TABLET, EXTENDED RELEASE ORAL
Qty: 30 TABLET | Refills: 0 | OUTPATIENT
Start: 2019-11-14

## 2019-11-14 RX ORDER — GABAPENTIN 400 MG/1
CAPSULE ORAL
Qty: 120 CAPSULE | OUTPATIENT
Start: 2019-11-14

## 2019-11-14 RX ADMIN — ERYTHROPOIETIN 60000 UNITS: 40000 INJECTION, SOLUTION INTRAVENOUS; SUBCUTANEOUS at 10:05

## 2019-11-14 NOTE — PROGRESS NOTES
Procedure   Large Joint Arthrocentesis: R knee  Date/Time: 11/14/2019 10:55 AM  Consent given by: patient  Site marked: site marked  Timeout: Immediately prior to procedure a time out was called to verify the correct patient, procedure, equipment, support staff and site/side marked as required   Supporting Documentation  Indications: pain   Procedure Details  Location: knee - R knee  Preparation: Patient was prepped and draped in the usual sterile fashion  Needle size: 22 G  Approach: superior (LATERAL)  Medications administered: 30 mg Hyaluronan 30 MG/2ML  Patient tolerance: patient tolerated the procedure well with no immediate complications      ORTHOVISC PROVIDED VIA [facility].    Patient presents to clinic today for right knee viscosupplement injections.  This is the 3rd injection of the series.  I explained details of injections as well as risks, benefits and alternatives with the patient today, had all questions answered, wished to proceed with injections.  I will see patient back in 6 weeks for re-evaluation. Patient was instructed to watch for signs or symptoms of infection including redness, swelling, warmth to the touch, or significant increased pain and to contact our office immediately if any of these issues were noted.

## 2019-11-15 ENCOUNTER — OFFICE VISIT (OUTPATIENT)
Dept: SPORTS MEDICINE | Facility: CLINIC | Age: 79
End: 2019-11-15

## 2019-11-15 VITALS
SYSTOLIC BLOOD PRESSURE: 112 MMHG | HEIGHT: 65 IN | BODY MASS INDEX: 33.82 KG/M2 | DIASTOLIC BLOOD PRESSURE: 70 MMHG | HEART RATE: 85 BPM | WEIGHT: 203 LBS | OXYGEN SATURATION: 95 %

## 2019-11-15 DIAGNOSIS — M19.012 PRIMARY LOCALIZED OSTEOARTHROSIS OF LEFT SHOULDER REGION: Primary | ICD-10-CM

## 2019-11-15 PROCEDURE — 20611 DRAIN/INJ JOINT/BURSA W/US: CPT | Performed by: FAMILY MEDICINE

## 2019-11-15 RX ORDER — TRIAMCINOLONE ACETONIDE 40 MG/ML
80 INJECTION, SUSPENSION INTRA-ARTICULAR; INTRAMUSCULAR ONCE
Status: COMPLETED | OUTPATIENT
Start: 2019-11-15 | End: 2019-11-15

## 2019-11-15 RX ADMIN — TRIAMCINOLONE ACETONIDE 80 MG: 40 INJECTION, SUSPENSION INTRA-ARTICULAR; INTRAMUSCULAR at 16:47

## 2019-11-15 NOTE — PROGRESS NOTES
"Here today for left shoulder ultrasound-guided glenohumeral joint injection requested by Dr. Glenn Wylie for osteoarthritis.    Ultrasound-Guided Shoulder Injection Procedure Note    Left shoulder injection was discussed with the patient in detail, including indication, risks, benefits, and alternatives. Verbal consent was given for the procedure. Injection was performed by physician.  Injection site was identified by ultrasound examination, then cleaned with Betadine and alcohol swabs. Prior to needle insertion, ethyl chloride spray was used for surface anesthesia. Sterile technique was used. Ultrasound guidance was indicated for injection accuracy.  A 22-gauge, 3.5\" spinal needle was guided to the glenohumeral joint under continuous direct ultrasound visualization. Injectate was seen filing the space and passed without difficulty. The needle was removed and a simple bandage was applied. The procedure was tolerated well without difficulty.    Injection mixture:  1% lidocaine without epinephrine: 2 mL  40 mg/mL triamcinolone acetonide: 2 mL    Diagnoses and all orders for this visit:    Primary localized osteoarthrosis of left shoulder region  -     triamcinolone acetonide (KENALOG-40) injection 80 mg            "

## 2019-11-20 ENCOUNTER — TELEPHONE (OUTPATIENT)
Dept: INTERNAL MEDICINE | Facility: CLINIC | Age: 79
End: 2019-11-20

## 2019-11-20 ENCOUNTER — INFUSION (OUTPATIENT)
Dept: ONCOLOGY | Facility: HOSPITAL | Age: 79
End: 2019-11-20

## 2019-11-20 ENCOUNTER — OFFICE VISIT (OUTPATIENT)
Dept: INTERNAL MEDICINE | Facility: CLINIC | Age: 79
End: 2019-11-20

## 2019-11-20 ENCOUNTER — LAB (OUTPATIENT)
Dept: LAB | Facility: HOSPITAL | Age: 79
End: 2019-11-20

## 2019-11-20 VITALS
BODY MASS INDEX: 33.78 KG/M2 | HEART RATE: 79 BPM | OXYGEN SATURATION: 95 % | HEIGHT: 65 IN | DIASTOLIC BLOOD PRESSURE: 68 MMHG | RESPIRATION RATE: 20 BRPM | SYSTOLIC BLOOD PRESSURE: 128 MMHG

## 2019-11-20 VITALS — TEMPERATURE: 98.1 F

## 2019-11-20 DIAGNOSIS — D46.C MYELODYSPLASTIC SYNDROME WITH 5Q DELETION (HCC): Primary | ICD-10-CM

## 2019-11-20 DIAGNOSIS — Z91.81 HISTORY OF FALLING: ICD-10-CM

## 2019-11-20 DIAGNOSIS — N18.30 CKD STAGE 3 DUE TO TYPE 2 DIABETES MELLITUS (HCC): ICD-10-CM

## 2019-11-20 DIAGNOSIS — D46.C MYELODYSPLASTIC SYNDROME WITH 5Q DELETION (HCC): ICD-10-CM

## 2019-11-20 DIAGNOSIS — I95.1 ORTHOSTATIC HYPOTENSION: ICD-10-CM

## 2019-11-20 DIAGNOSIS — F41.9 ANXIETY AND DEPRESSION: ICD-10-CM

## 2019-11-20 DIAGNOSIS — E11.22 CKD STAGE 3 DUE TO TYPE 2 DIABETES MELLITUS (HCC): ICD-10-CM

## 2019-11-20 DIAGNOSIS — E11.40 TYPE 2 DIABETES MELLITUS WITH DIABETIC NEUROPATHY, WITH LONG-TERM CURRENT USE OF INSULIN (HCC): ICD-10-CM

## 2019-11-20 DIAGNOSIS — M19.012 PRIMARY LOCALIZED OSTEOARTHROSIS OF LEFT SHOULDER REGION: ICD-10-CM

## 2019-11-20 DIAGNOSIS — Z79.4 TYPE 2 DIABETES MELLITUS WITH DIABETIC NEUROPATHY, WITH LONG-TERM CURRENT USE OF INSULIN (HCC): ICD-10-CM

## 2019-11-20 DIAGNOSIS — F32.A ANXIETY AND DEPRESSION: ICD-10-CM

## 2019-11-20 DIAGNOSIS — E03.9 ACQUIRED HYPOTHYROIDISM: ICD-10-CM

## 2019-11-20 DIAGNOSIS — S83.209A CURRENT TEAR OF MENISCUS OF KNEE, UNSPECIFIED LATERALITY, UNSPECIFIED MENISCUS, UNSPECIFIED TEAR TYPE, INITIAL ENCOUNTER: ICD-10-CM

## 2019-11-20 DIAGNOSIS — D64.9 ANEMIA REQUIRING TRANSFUSIONS: ICD-10-CM

## 2019-11-20 DIAGNOSIS — I10 ESSENTIAL HYPERTENSION: ICD-10-CM

## 2019-11-20 DIAGNOSIS — N39.0 RECURRENT UTI: ICD-10-CM

## 2019-11-20 LAB
BASOPHILS # BLD AUTO: 0.11 10*3/MM3 (ref 0–0.2)
BASOPHILS NFR BLD AUTO: 1.4 % (ref 0–1.5)
BILIRUB BLD-MCNC: NEGATIVE MG/DL
CLARITY, POC: CLEAR
COLOR UR: YELLOW
DEPRECATED RDW RBC AUTO: 91.6 FL (ref 37–54)
EOSINOPHIL # BLD AUTO: 0.43 10*3/MM3 (ref 0–0.4)
EOSINOPHIL NFR BLD AUTO: 5.4 % (ref 0.3–6.2)
ERYTHROCYTE [DISTWIDTH] IN BLOOD BY AUTOMATED COUNT: 24.3 % (ref 12.3–15.4)
GLUCOSE UR STRIP-MCNC: NEGATIVE MG/DL
HCT VFR BLD AUTO: 26.7 % (ref 34–46.6)
HGB BLD-MCNC: 8.6 G/DL (ref 12–15.9)
IMM GRANULOCYTES # BLD AUTO: 0.1 10*3/MM3 (ref 0–0.05)
IMM GRANULOCYTES NFR BLD AUTO: 1.3 % (ref 0–0.5)
KETONES UR QL: NEGATIVE
LEUKOCYTE EST, POC: NEGATIVE
LYMPHOCYTES # BLD AUTO: 2.51 10*3/MM3 (ref 0.7–3.1)
LYMPHOCYTES NFR BLD AUTO: 31.4 % (ref 19.6–45.3)
MCH RBC QN AUTO: 33.9 PG (ref 26.6–33)
MCHC RBC AUTO-ENTMCNC: 32.2 G/DL (ref 31.5–35.7)
MCV RBC AUTO: 105.1 FL (ref 79–97)
MONOCYTES # BLD AUTO: 0.37 10*3/MM3 (ref 0.1–0.9)
MONOCYTES NFR BLD AUTO: 4.6 % (ref 5–12)
NEUTROPHILS # BLD AUTO: 4.47 10*3/MM3 (ref 1.7–7)
NEUTROPHILS NFR BLD AUTO: 55.9 % (ref 42.7–76)
NITRITE UR-MCNC: NEGATIVE MG/ML
NRBC BLD AUTO-RTO: 0 /100 WBC (ref 0–0.2)
PH UR: 6.5 [PH] (ref 5–8)
PLATELET # BLD AUTO: 456 10*3/MM3 (ref 140–450)
PMV BLD AUTO: 11.3 FL (ref 6–12)
PROT UR STRIP-MCNC: NEGATIVE MG/DL
RBC # BLD AUTO: 2.54 10*6/MM3 (ref 3.77–5.28)
RBC # UR STRIP: NEGATIVE /UL
SP GR UR: 1.01 (ref 1–1.03)
UROBILINOGEN UR QL: NORMAL
WBC NRBC COR # BLD: 7.99 10*3/MM3 (ref 3.4–10.8)

## 2019-11-20 PROCEDURE — 96372 THER/PROPH/DIAG INJ SC/IM: CPT | Performed by: NURSE PRACTITIONER

## 2019-11-20 PROCEDURE — 81003 URINALYSIS AUTO W/O SCOPE: CPT | Performed by: INTERNAL MEDICINE

## 2019-11-20 PROCEDURE — 36415 COLL VENOUS BLD VENIPUNCTURE: CPT

## 2019-11-20 PROCEDURE — 85025 COMPLETE CBC W/AUTO DIFF WBC: CPT | Performed by: INTERNAL MEDICINE

## 2019-11-20 PROCEDURE — 25010000002 EPOETIN ALFA PER 1000 UNITS: Performed by: NURSE PRACTITIONER

## 2019-11-20 PROCEDURE — 99214 OFFICE O/P EST MOD 30 MIN: CPT | Performed by: INTERNAL MEDICINE

## 2019-11-20 RX ORDER — LEVOTHYROXINE SODIUM 88 UG/1
TABLET ORAL
COMMUNITY
Start: 2019-11-14 | End: 2019-12-17

## 2019-11-20 RX ORDER — GABAPENTIN 400 MG/1
CAPSULE ORAL
COMMUNITY
Start: 2019-11-14 | End: 2020-01-29 | Stop reason: SDUPTHER

## 2019-11-20 RX ADMIN — ERYTHROPOIETIN 60000 UNITS: 40000 INJECTION, SOLUTION INTRAVENOUS; SUBCUTANEOUS at 09:46

## 2019-11-20 NOTE — PROGRESS NOTES
Joya Singh is a 79 y.o. female, who presents with a chief complaint of   Chief Complaint   Patient presents with   • Shoulder Pain   • Urinary Tract Infection       HPI   Pt here for follow up.  She fell at home on 9/16 and had acute right medial meniscus tear with intractable pain.  She also had a uti.  She went to a nursing home for short term rehab.  She was doing PT with home health but she said they told her she had run out of visits.  She had lots of shoulder pain as well.  She saw dr. vera on Friday.  she thinks the injection has helped some.  She is doing better giving herself a shower and getting herself dressed.  She is taking norco q 6 hours and wants to take them q 4 hours.  We discussed trying to limit narcotic doses to help prevent falls/balance issues.  Pt aware of dependence issues with chronic narcotic use.     MDS - pt got her procrit today.  hgb was 8.6    DM2 with hyperglycemia in obese with diabetic peripheral neuropathy: A1c 8.0% in September.  She says she is doing fair with glucose control.  She is on tresiba and meal time insulin .  She doesn't always remember all of her meal time doses.      ckd III-IV - stable on last labs.     Recurrent UTI - she took her last keflex on Sunday.  Last urine culture showed >100,000cfu mixed kedar.    Hypothyroidism - tsh up to 6.3 in the hospital and her synthroid dose was increased while in the hospital.      The following portions of the patient's history were reviewed and updated as appropriate: allergies, current medications, past family history, past medical history, past social history, past surgical history and problem list.    Allergies: Baclofen; Eliquis [apixaban]; Codeine; Lisinopril; Morphine; and Penicillins    Review of Systems   Constitutional: Negative.    HENT: Negative.    Eyes: Negative.    Respiratory: Negative.    Cardiovascular: Negative.    Gastrointestinal: Negative.    Endocrine: Negative.    Genitourinary: Positive for  dysuria.   Musculoskeletal: Positive for arthralgias and gait problem.        Knee and shoulder pain   Skin: Negative.    Allergic/Immunologic: Negative.    Hematological: Negative.    Psychiatric/Behavioral: Negative.    All other systems reviewed and are negative.            Wt Readings from Last 3 Encounters:   11/15/19 92.1 kg (203 lb)   11/14/19 92.1 kg (203 lb)   10/11/19 92.1 kg (203 lb)     Temp Readings from Last 3 Encounters:   11/20/19 98.1 °F (36.7 °C)   11/14/19 97.5 °F (36.4 °C)   11/07/19 98 °F (36.7 °C)     BP Readings from Last 3 Encounters:   11/20/19 128/68   11/15/19 112/70   11/14/19 113/70     Pulse Readings from Last 3 Encounters:   11/20/19 79   11/15/19 85   11/14/19 69     Body mass index is 33.78 kg/m².  @LASTSAO2(3)@    Physical Exam   Constitutional: She is oriented to person, place, and time. She appears well-developed and well-nourished. No distress.   HENT:   Head: Normocephalic and atraumatic.   Right Ear: External ear normal.   Left Ear: External ear normal.   Nose: Nose normal.   Mouth/Throat: Oropharynx is clear and moist.   Eyes: Conjunctivae and EOM are normal. Pupils are equal, round, and reactive to light.   Neck: Normal range of motion. Neck supple.   Cardiovascular: Normal rate, regular rhythm, normal heart sounds and intact distal pulses.   Pulmonary/Chest: Effort normal and breath sounds normal. No respiratory distress. She has no wheezes.   Musculoskeletal: She exhibits no edema.   In wheelchair  Brace on right knee     Neurological: She is alert and oriented to person, place, and time.   Skin: Skin is warm and dry.   Psychiatric: She has a normal mood and affect. Her behavior is normal. Judgment and thought content normal.   Nursing note and vitals reviewed.      Results for orders placed or performed in visit on 11/20/19   CBC Auto Differential   Result Value Ref Range    WBC 7.99 3.40 - 10.80 10*3/mm3    RBC 2.54 (L) 3.77 - 5.28 10*6/mm3    Hemoglobin 8.6 (L) 12.0 -  15.9 g/dL    Hematocrit 26.7 (L) 34.0 - 46.6 %    .1 (H) 79.0 - 97.0 fL    MCH 33.9 (H) 26.6 - 33.0 pg    MCHC 32.2 31.5 - 35.7 g/dL    RDW 24.3 (H) 12.3 - 15.4 %    RDW-SD 91.6 (H) 37.0 - 54.0 fl    MPV 11.3 6.0 - 12.0 fL    Platelets 456 (H) 140 - 450 10*3/mm3    Neutrophil % 55.9 42.7 - 76.0 %    Lymphocyte % 31.4 19.6 - 45.3 %    Monocyte % 4.6 (L) 5.0 - 12.0 %    Eosinophil % 5.4 0.3 - 6.2 %    Basophil % 1.4 0.0 - 1.5 %    Immature Grans % 1.3 (H) 0.0 - 0.5 %    Neutrophils, Absolute 4.47 1.70 - 7.00 10*3/mm3    Lymphocytes, Absolute 2.51 0.70 - 3.10 10*3/mm3    Monocytes, Absolute 0.37 0.10 - 0.90 10*3/mm3    Eosinophils, Absolute 0.43 (H) 0.00 - 0.40 10*3/mm3    Basophils, Absolute 0.11 0.00 - 0.20 10*3/mm3    Immature Grans, Absolute 0.10 (H) 0.00 - 0.05 10*3/mm3    nRBC 0.0 0.0 - 0.2 /100 WBC     *Note: Due to a large number of results and/or encounters for the requested time period, some results have not been displayed. A complete set of results can be found in Results Review.           Joya was seen today for shoulder pain and urinary tract infection.    Diagnoses and all orders for this visit:    Myelodysplastic syndrome with 5q deletion (CMS/[facility])  -     Ambulatory Referral to Home Health  -     Motorized Wheelchair    Primary localized osteoarthrosis of left shoulder region  -     Ambulatory Referral to Home Health  -     Motorized Wheelchair    History of falling  -     Ambulatory Referral to Home Health  -     Motorized Wheelchair    Current tear of meniscus of knee, unspecified laterality, unspecified meniscus, unspecified tear type, initial encounter  -     Ambulatory Referral to Home Health  -     Motorized Wheelchair    Type 2 diabetes mellitus with diabetic neuropathy, with long-term current use of insulin (CMS/[facility])  -     Comprehensive Metabolic Panel  -     T4, Free  -     TSH  -     Hemoglobin A1c  -     Ambulatory Referral to Home Health  -     Motorized Wheelchair    Essential  hypertension  -     Comprehensive Metabolic Panel  -     Ambulatory Referral to Home Health    Orthostatic hypotension  -     Ambulatory Referral to Home Health  -     Motorized Wheelchair    Acquired hypothyroidism  -     T4, Free  -     TSH  -     Ambulatory Referral to Home Health    Recurrent UTI  -     POC Urinalysis Dipstick, Automated      40 min spent in patient care with >50% of time spent in counseling about above issues including continued therapy now that pt home from acute inpt rehab.     Outpatient Medications Prior to Visit   Medication Sig Dispense Refill   • ACCU-CHEK FASTCLIX LANCETS misc TEST 3-4 TIMES DAILY AS DIRECTED 400 each 3   • ACCU-CHEK SMARTVIEW test strip TEST BLOOD SUGAR THREE TIMES DAILY OR AS DIRECTED 300 each 3   • acetaminophen (TYLENOL) 325 MG tablet Take 2 tablets by mouth Every 6 (Six) Hours As Needed for Mild Pain . OTC product 40 tablet 0   • albuterol sulfate  (90 Base) MCG/ACT inhaler Inhale 2 puffs Every 4 (Four) Hours As Needed for Wheezing. 1 inhaler 3   • Alcohol Swabs (B-D SINGLE USE SWABS REGULAR) pads      • atorvastatin (LIPITOR) 10 MG tablet TAKE ONE TABLET BY MOUTH AT BEDTIME 90 tablet 1   • benzonatate (TESSALON) 200 MG capsule TAKE ONE CAPSULE BY MOUTH THREE TIMES DAILY AS NEEDED FOR COUGH 20 capsule 0   • Blood Glucose Monitoring Suppl (ACCU-CHEK KENNETH SMARTVIEW) w/Device kit TEST blood sugar three times daily or as directed 1 kit 0   • Calcium Carbonate-Vit D-Min (CALCIUM 1200) 7355-3274 MG-UNIT chewable tablet Chew 1 tablet Daily.     • cholecalciferol 2000 units tablet Take 2,000 Units by mouth Daily.     • cyclobenzaprine (FLEXERIL) 10 MG tablet TAKE ONE TABLET BY MOUTH TWICE DAILY as needed for muscle spasms 30 tablet 0   • desvenlafaxine (PRISTIQ) 50 MG 24 hr tablet Take 1 tablet by mouth Daily.     • diphenhydrAMINE (BENADRYL) 25 mg capsule Take 25 mg by mouth Every 6 (Six) Hours As Needed for Itching.     • docusate sodium (COLACE) 100 MG capsule  "Take 1 capsule by mouth 2 (Two) Times a Day.     • epoetin chu 18077 UNIT/ML solution 20,000 Units, epoetin chu 68949 UNIT/ML solution 40,000 Units Inject 60,000 Units under the skin into the appropriate area as directed 1 (One) Time Per Week.     • fluticasone (FLONASE) 50 MCG/ACT nasal spray 2 sprays into the nostril(s) as directed by provider Daily. 16 g 0   • furosemide (LASIX) 20 MG tablet TAKE ONE (1) TABLET ORALLY (BY MOUTH) ONCE DAILY 90 tablet 1   • gabapentin (NEURONTIN) 400 MG capsule      • HYDROcodone-acetaminophen (NORCO) 5-325 MG per tablet Take 1 tablet by mouth Every 6 (Six) Hours As Needed for Moderate Pain . 60 tablet 0   • insulin aspart (novoLOG) 100 UNIT/ML injection Inject 15 Units under the skin into the appropriate area as directed 3 (Three) Times a Day With Meals.  12   • insulin aspart (novoLOG) 100 UNIT/ML injection Inject 0-9 Units under the skin into the appropriate area as directed 4 (Four) Times a Day Before Meals & at Bedtime.  12   • Insulin Degludec (TRESIBA FLEXTOUCH) 200 UNIT/ML solution pen-injector Inject 54 Units under the skin into the appropriate area as directed every night at bedtime. 9 mL 11   • lactobacillus acidophilus (RISAQUAD) capsule capsule Take 1 capsule by mouth Daily.     • levothyroxine (SYNTHROID, LEVOTHROID) 112 MCG tablet Take 1 tablet by mouth Every Morning.     • levothyroxine (SYNTHROID, LEVOTHROID) 88 MCG tablet      • melatonin 5 MG tablet tablet Take 1 tablet by mouth At Night As Needed (insomnia).     • midodrine (PROAMATINE) 2.5 MG tablet Take 1 tablet by mouth 3 (Three) Times a Day. 90 tablet 6   • Multiple Vitamins-Minerals (CENTRUM SILVER PO) Take  by mouth Daily.     • Needle, Disp, (BD DISP NEEDLES) 30G X 1/2\" misc To be used 3 times daily with Novolog Flexpen. 100 each 5   • NYSTATIN 178968 UNIT/GM powder APPLY TOPICALLY 3 TIMES A DAY 60 g 2   • O2 (OXYGEN) Inhale 2 L/min Every Night. Uses 2L  overnight only     • omeprazole (priLOSEC) 40 MG " capsule TAKE ONE CAPSULE BY MOUTH EVERY DAY 90 capsule 1   • ondansetron (ZOFRAN) 8 MG tablet Take 1 tablet by mouth Every 8 (Eight) Hours As Needed for Nausea or Vomiting. 15 tablet 0   • ORTHOVISC 30 MG/2ML solution prefilled syringe injection      • polyethylene glycol (MIRALAX) pack packet Take 17 g by mouth Daily.     • potassium chloride (K-DUR) 10 MEQ CR tablet TAKE ONE (1) TABLET ORALLY (BY MOUTH) ONCE DAILY 90 tablet 2   • promethazine (PHENERGAN) 12.5 MG tablet TABLET ONE-HALF TABLET TO ONE TABLET BY MOUTH EVERY 6 HOURS AS NEEDED FOR NAUSEA 20 tablet 0   • ULTICARE MINI PEN NEEDLES 31G X 6 MM misc USE TO INJECT INSULINS 4 TIMES DAILY AS DIRECTED 400 each 3     Facility-Administered Medications Prior to Visit   Medication Dose Route Frequency Provider Last Rate Last Dose   • epoetin chu (EPOGEN,PROCRIT) 60,000 Units 2 mL injection  60,000 Units Subcutaneous Weekly Elieser Headley MD   60,000 Units at 11/20/19 0946     No orders of the defined types were placed in this encounter.    [unfilled]  There are no discontinued medications.      Return in about 1 month (around 12/20/2019) for Recheck.

## 2019-11-20 NOTE — TELEPHONE ENCOUNTER
MARYBETH HADDAD FROM HOME HEALTH CALLED AND SAID THERE IS NOT MUCH MORE SHE CAN DO FOR PATIENT. SHE SAID PATIENT NEEDS SUPPORT IN HOME. SHE HAS VISITED PATIENT 7 TIMES AND PT HAS MADE 4 VISITS.    IF DR. REA WOULD LIKE TO TALK TO HER, PLEASE HAVE HER CALL.    OK.  Pt requested more home health so I was trying to help her with home therapy.  We can try to encourage her to try to work on her exercises independently. - GATO

## 2019-11-21 ENCOUNTER — APPOINTMENT (OUTPATIENT)
Dept: LAB | Facility: HOSPITAL | Age: 79
End: 2019-11-21

## 2019-11-21 ENCOUNTER — APPOINTMENT (OUTPATIENT)
Dept: ONCOLOGY | Facility: HOSPITAL | Age: 79
End: 2019-11-21

## 2019-11-21 LAB
ALBUMIN SERPL-MCNC: 4.3 G/DL (ref 3.5–5.2)
ALBUMIN/GLOB SERPL: 1.7 G/DL
ALP SERPL-CCNC: 108 U/L (ref 39–117)
ALT SERPL-CCNC: 9 U/L (ref 1–33)
AST SERPL-CCNC: 9 U/L (ref 1–32)
BILIRUB SERPL-MCNC: 0.5 MG/DL (ref 0.2–1.2)
BUN SERPL-MCNC: 30 MG/DL (ref 8–23)
BUN/CREAT SERPL: 16.2 (ref 7–25)
CALCIUM SERPL-MCNC: 9.3 MG/DL (ref 8.6–10.5)
CHLORIDE SERPL-SCNC: 99 MMOL/L (ref 98–107)
CO2 SERPL-SCNC: 28.6 MMOL/L (ref 22–29)
CREAT SERPL-MCNC: 1.85 MG/DL (ref 0.57–1)
GLOBULIN SER CALC-MCNC: 2.6 GM/DL
GLUCOSE SERPL-MCNC: 204 MG/DL (ref 65–99)
HBA1C MFR BLD: 7.5 % (ref 4.8–5.6)
POTASSIUM SERPL-SCNC: 4.9 MMOL/L (ref 3.5–5.2)
PROT SERPL-MCNC: 6.9 G/DL (ref 6–8.5)
SODIUM SERPL-SCNC: 141 MMOL/L (ref 136–145)
T4 FREE SERPL-MCNC: 1.36 NG/DL (ref 0.93–1.7)
TSH SERPL DL<=0.005 MIU/L-ACNC: 2.66 UIU/ML (ref 0.27–4.2)

## 2019-11-21 RX ORDER — DESVENLAFAXINE SUCCINATE 50 MG/1
TABLET, EXTENDED RELEASE ORAL
Qty: 30 TABLET | Refills: 0 | Status: SHIPPED | OUTPATIENT
Start: 2019-11-21 | End: 2019-12-17 | Stop reason: SDUPTHER

## 2019-11-21 NOTE — TELEPHONE ENCOUNTER
Advised Ms. Everett as per below, and spoke with patient at length.  I explained to her it is very important that she continues her exercise, to call us if at any point should her strength and/or pain worsens, and then we may be able to recertify her home health care.  Patient voiced understanding.

## 2019-11-22 ENCOUNTER — TELEPHONE (OUTPATIENT)
Dept: INTERNAL MEDICINE | Facility: CLINIC | Age: 79
End: 2019-11-22

## 2019-11-22 NOTE — TELEPHONE ENCOUNTER
----- Message from Chari Mercado MD sent at 11/22/2019  8:55 AM EST -----  Call pt about labs. Kidney function stable.  Thyroid labs ok.  a1c better at 7.5

## 2019-11-26 ENCOUNTER — TELEPHONE (OUTPATIENT)
Dept: INTERNAL MEDICINE | Facility: CLINIC | Age: 79
End: 2019-11-26

## 2019-11-26 NOTE — TELEPHONE ENCOUNTER
Patient recently seen and had result of negative UTI.  Phones today stating she is urinating constantly and is burning up.  She is home alone and can't get to office.  Please advise.

## 2019-11-27 ENCOUNTER — APPOINTMENT (OUTPATIENT)
Dept: ONCOLOGY | Facility: HOSPITAL | Age: 79
End: 2019-11-27

## 2019-11-27 ENCOUNTER — CLINICAL SUPPORT (OUTPATIENT)
Dept: INTERNAL MEDICINE | Facility: CLINIC | Age: 79
End: 2019-11-27

## 2019-11-27 ENCOUNTER — TELEPHONE (OUTPATIENT)
Dept: INTERNAL MEDICINE | Facility: CLINIC | Age: 79
End: 2019-11-27

## 2019-11-27 ENCOUNTER — APPOINTMENT (OUTPATIENT)
Dept: LAB | Facility: HOSPITAL | Age: 79
End: 2019-11-27

## 2019-11-27 ENCOUNTER — INFUSION (OUTPATIENT)
Dept: ONCOLOGY | Facility: HOSPITAL | Age: 79
End: 2019-11-27

## 2019-11-27 ENCOUNTER — LAB (OUTPATIENT)
Dept: LAB | Facility: HOSPITAL | Age: 79
End: 2019-11-27

## 2019-11-27 VITALS
HEART RATE: 79 BPM | DIASTOLIC BLOOD PRESSURE: 59 MMHG | TEMPERATURE: 97.8 F | OXYGEN SATURATION: 99 % | SYSTOLIC BLOOD PRESSURE: 102 MMHG

## 2019-11-27 DIAGNOSIS — R31.9 HEMATURIA, UNSPECIFIED TYPE: ICD-10-CM

## 2019-11-27 DIAGNOSIS — R82.998 LEUKOCYTES IN URINE: Primary | ICD-10-CM

## 2019-11-27 DIAGNOSIS — D64.9 ANEMIA REQUIRING TRANSFUSIONS: Primary | ICD-10-CM

## 2019-11-27 DIAGNOSIS — D46.C MYELODYSPLASTIC SYNDROME WITH 5Q DELETION (HCC): ICD-10-CM

## 2019-11-27 DIAGNOSIS — D64.9 ANEMIA REQUIRING TRANSFUSIONS: ICD-10-CM

## 2019-11-27 DIAGNOSIS — N18.30 CKD STAGE 3 DUE TO TYPE 2 DIABETES MELLITUS (HCC): ICD-10-CM

## 2019-11-27 DIAGNOSIS — E11.22 CKD STAGE 3 DUE TO TYPE 2 DIABETES MELLITUS (HCC): ICD-10-CM

## 2019-11-27 LAB
BASOPHILS # BLD AUTO: 0.12 10*3/MM3 (ref 0–0.2)
BASOPHILS NFR BLD AUTO: 1.2 % (ref 0–1.5)
BILIRUB BLD-MCNC: NEGATIVE MG/DL
CLARITY, POC: ABNORMAL
COLOR UR: YELLOW
DEPRECATED RDW RBC AUTO: 102 FL (ref 37–54)
EOSINOPHIL # BLD AUTO: 0.43 10*3/MM3 (ref 0–0.4)
EOSINOPHIL NFR BLD AUTO: 4.3 % (ref 0.3–6.2)
ERYTHROCYTE [DISTWIDTH] IN BLOOD BY AUTOMATED COUNT: 26.3 % (ref 12.3–15.4)
GLUCOSE UR STRIP-MCNC: NEGATIVE MG/DL
HCT VFR BLD AUTO: 27.8 % (ref 34–46.6)
HGB BLD-MCNC: 9 G/DL (ref 12–15.9)
IMM GRANULOCYTES # BLD AUTO: 0.18 10*3/MM3 (ref 0–0.05)
IMM GRANULOCYTES NFR BLD AUTO: 1.8 % (ref 0–0.5)
KETONES UR QL: NEGATIVE
LEUKOCYTE EST, POC: ABNORMAL
LYMPHOCYTES # BLD AUTO: 2.51 10*3/MM3 (ref 0.7–3.1)
LYMPHOCYTES NFR BLD AUTO: 25.2 % (ref 19.6–45.3)
MCH RBC QN AUTO: 34.7 PG (ref 26.6–33)
MCHC RBC AUTO-ENTMCNC: 32.4 G/DL (ref 31.5–35.7)
MCV RBC AUTO: 107.3 FL (ref 79–97)
MONOCYTES # BLD AUTO: 0.37 10*3/MM3 (ref 0.1–0.9)
MONOCYTES NFR BLD AUTO: 3.7 % (ref 5–12)
NEUTROPHILS # BLD AUTO: 6.34 10*3/MM3 (ref 1.7–7)
NEUTROPHILS NFR BLD AUTO: 63.8 % (ref 42.7–76)
NITRITE UR-MCNC: POSITIVE MG/ML
NRBC BLD AUTO-RTO: 0 /100 WBC (ref 0–0.2)
PH UR: 6 [PH] (ref 5–8)
PLATELET # BLD AUTO: 397 10*3/MM3 (ref 140–450)
PMV BLD AUTO: 11.8 FL (ref 6–12)
PROT UR STRIP-MCNC: NEGATIVE MG/DL
RBC # BLD AUTO: 2.59 10*6/MM3 (ref 3.77–5.28)
RBC # UR STRIP: ABNORMAL /UL
SP GR UR: 1.01 (ref 1–1.03)
UROBILINOGEN UR QL: NORMAL
WBC NRBC COR # BLD: 9.95 10*3/MM3 (ref 3.4–10.8)

## 2019-11-27 PROCEDURE — 81003 URINALYSIS AUTO W/O SCOPE: CPT | Performed by: INTERNAL MEDICINE

## 2019-11-27 PROCEDURE — 25010000002 EPOETIN ALFA PER 1000 UNITS: Performed by: INTERNAL MEDICINE

## 2019-11-27 PROCEDURE — 96372 THER/PROPH/DIAG INJ SC/IM: CPT

## 2019-11-27 PROCEDURE — 85025 COMPLETE CBC W/AUTO DIFF WBC: CPT | Performed by: INTERNAL MEDICINE

## 2019-11-27 PROCEDURE — 36415 COLL VENOUS BLD VENIPUNCTURE: CPT

## 2019-11-27 RX ORDER — NITROFURANTOIN 25; 75 MG/1; MG/1
100 CAPSULE ORAL 2 TIMES DAILY
Qty: 14 CAPSULE | Refills: 0 | Status: SHIPPED | OUTPATIENT
Start: 2019-11-27 | End: 2020-01-13 | Stop reason: SDUPTHER

## 2019-11-27 RX ADMIN — ERYTHROPOIETIN 60000 UNITS: 40000 INJECTION, SOLUTION INTRAVENOUS; SUBCUTANEOUS at 14:19

## 2019-11-27 NOTE — TELEPHONE ENCOUNTER
POC dip UA in office (see results). Sent for urine culture. Dr. Mercado advised Macrobid 100mg BID for 7 days #14. Pt aware and voiced understanding.

## 2019-12-01 LAB
BACTERIA UR CULT: ABNORMAL
BACTERIA UR CULT: ABNORMAL
OTHER ANTIBIOTIC SUSC ISLT: ABNORMAL

## 2019-12-04 ENCOUNTER — INFUSION (OUTPATIENT)
Dept: ONCOLOGY | Facility: HOSPITAL | Age: 79
End: 2019-12-04

## 2019-12-04 ENCOUNTER — LAB (OUTPATIENT)
Dept: LAB | Facility: HOSPITAL | Age: 79
End: 2019-12-04

## 2019-12-04 VITALS
SYSTOLIC BLOOD PRESSURE: 121 MMHG | DIASTOLIC BLOOD PRESSURE: 66 MMHG | TEMPERATURE: 97.5 F | OXYGEN SATURATION: 96 % | HEART RATE: 77 BPM

## 2019-12-04 DIAGNOSIS — N18.30 CKD STAGE 3 DUE TO TYPE 2 DIABETES MELLITUS (HCC): ICD-10-CM

## 2019-12-04 DIAGNOSIS — E11.22 CKD STAGE 3 DUE TO TYPE 2 DIABETES MELLITUS (HCC): ICD-10-CM

## 2019-12-04 DIAGNOSIS — D46.C MYELODYSPLASTIC SYNDROME WITH 5Q DELETION (HCC): ICD-10-CM

## 2019-12-04 DIAGNOSIS — D64.9 ANEMIA REQUIRING TRANSFUSIONS: ICD-10-CM

## 2019-12-04 DIAGNOSIS — D64.9 ANEMIA REQUIRING TRANSFUSIONS: Primary | ICD-10-CM

## 2019-12-04 LAB
ABO GROUP BLD: NORMAL
BASOPHILS # BLD AUTO: 0.14 10*3/MM3 (ref 0–0.2)
BASOPHILS NFR BLD AUTO: 1.6 % (ref 0–1.5)
BLD GP AB SCN SERPL QL: NEGATIVE
DEPRECATED RDW RBC AUTO: ABNORMAL FL
EOSINOPHIL # BLD AUTO: 0.72 10*3/MM3 (ref 0–0.4)
EOSINOPHIL NFR BLD AUTO: 8.1 % (ref 0.3–6.2)
ERYTHROCYTE [DISTWIDTH] IN BLOOD BY AUTOMATED COUNT: ABNORMAL %
HCT VFR BLD AUTO: 23.4 % (ref 34–46.6)
HGB BLD-MCNC: 7.7 G/DL (ref 12–15.9)
IMM GRANULOCYTES # BLD AUTO: 0.12 10*3/MM3 (ref 0–0.05)
IMM GRANULOCYTES NFR BLD AUTO: 1.3 % (ref 0–0.5)
LYMPHOCYTES # BLD AUTO: 1.34 10*3/MM3 (ref 0.7–3.1)
LYMPHOCYTES NFR BLD AUTO: 15 % (ref 19.6–45.3)
MCH RBC QN AUTO: 35.5 PG (ref 26.6–33)
MCHC RBC AUTO-ENTMCNC: 32.9 G/DL (ref 31.5–35.7)
MCV RBC AUTO: 107.8 FL (ref 79–97)
MONOCYTES # BLD AUTO: 0.35 10*3/MM3 (ref 0.1–0.9)
MONOCYTES NFR BLD AUTO: 3.9 % (ref 5–12)
NEUTROPHILS # BLD AUTO: 6.26 10*3/MM3 (ref 1.7–7)
NEUTROPHILS NFR BLD AUTO: 70.1 % (ref 42.7–76)
NRBC BLD AUTO-RTO: 0 /100 WBC (ref 0–0.2)
PLATELET # BLD AUTO: 391 10*3/MM3 (ref 140–450)
PMV BLD AUTO: 11.7 FL (ref 6–12)
RBC # BLD AUTO: 2.17 10*6/MM3 (ref 3.77–5.28)
RH BLD: POSITIVE
T&S EXPIRATION DATE: NORMAL
WBC NRBC COR # BLD: 8.93 10*3/MM3 (ref 3.4–10.8)

## 2019-12-04 PROCEDURE — 36415 COLL VENOUS BLD VENIPUNCTURE: CPT

## 2019-12-04 PROCEDURE — 86900 BLOOD TYPING SEROLOGIC ABO: CPT | Performed by: INTERNAL MEDICINE

## 2019-12-04 PROCEDURE — 25010000002 EPOETIN ALFA PER 1000 UNITS: Performed by: INTERNAL MEDICINE

## 2019-12-04 PROCEDURE — 86901 BLOOD TYPING SEROLOGIC RH(D): CPT | Performed by: INTERNAL MEDICINE

## 2019-12-04 PROCEDURE — 96372 THER/PROPH/DIAG INJ SC/IM: CPT

## 2019-12-04 PROCEDURE — 85025 COMPLETE CBC W/AUTO DIFF WBC: CPT | Performed by: INTERNAL MEDICINE

## 2019-12-04 PROCEDURE — 86923 COMPATIBILITY TEST ELECTRIC: CPT

## 2019-12-04 PROCEDURE — 86850 RBC ANTIBODY SCREEN: CPT | Performed by: INTERNAL MEDICINE

## 2019-12-04 RX ORDER — SODIUM CHLORIDE 9 MG/ML
250 INJECTION, SOLUTION INTRAVENOUS AS NEEDED
Status: CANCELLED | OUTPATIENT
Start: 2019-12-04

## 2019-12-04 RX ORDER — ACETAMINOPHEN 325 MG/1
650 TABLET ORAL ONCE
Status: CANCELLED | OUTPATIENT
Start: 2019-12-04 | End: 2019-12-04

## 2019-12-04 RX ORDER — DIPHENHYDRAMINE HCL 25 MG
25 CAPSULE ORAL ONCE
Status: CANCELLED | OUTPATIENT
Start: 2019-12-04 | End: 2019-12-04

## 2019-12-04 RX ADMIN — ERYTHROPOIETIN 60000 UNITS: 40000 INJECTION, SOLUTION INTRAVENOUS; SUBCUTANEOUS at 10:39

## 2019-12-04 NOTE — PROGRESS NOTES
A 2 unit blood transfusion was scheduled for 9:30am tomorrow at [facility] for hgb of 7.7. The pt c/o not feeling well with symptoms including fatigue and dizziness this morning.

## 2019-12-05 ENCOUNTER — HOSPITAL ENCOUNTER (OUTPATIENT)
Dept: INFUSION THERAPY | Facility: HOSPITAL | Age: 79
Discharge: HOME | End: 2019-12-05
Admitting: INTERNAL MEDICINE

## 2019-12-05 VITALS
OXYGEN SATURATION: 99 % | SYSTOLIC BLOOD PRESSURE: 141 MMHG | DIASTOLIC BLOOD PRESSURE: 62 MMHG | HEART RATE: 78 BPM | BODY MASS INDEX: 33.83 KG/M2 | HEIGHT: 65 IN | RESPIRATION RATE: 16 BRPM | TEMPERATURE: 98.4 F | WEIGHT: 203.04 LBS

## 2019-12-05 DIAGNOSIS — D64.9 ANEMIA REQUIRING TRANSFUSIONS: ICD-10-CM

## 2019-12-05 DIAGNOSIS — D46.C MYELODYSPLASTIC SYNDROME WITH 5Q DELETION (HCC): ICD-10-CM

## 2019-12-05 PROCEDURE — 86900 BLOOD TYPING SEROLOGIC ABO: CPT

## 2019-12-05 PROCEDURE — A9270 NON-COVERED ITEM OR SERVICE: HCPCS

## 2019-12-05 PROCEDURE — P9016 RBC LEUKOCYTES REDUCED: HCPCS

## 2019-12-05 PROCEDURE — 63710000001 DIPHENHYDRAMINE PER 50 MG

## 2019-12-05 PROCEDURE — 36430 TRANSFUSION BLD/BLD COMPNT: CPT

## 2019-12-05 PROCEDURE — 63710000001 ACETAMINOPHEN 325 MG TABLET

## 2019-12-05 RX ORDER — DIPHENHYDRAMINE HCL 25 MG
CAPSULE ORAL
Status: COMPLETED
Start: 2019-12-05 | End: 2019-12-05

## 2019-12-05 RX ORDER — DIPHENHYDRAMINE HCL 25 MG
25 CAPSULE ORAL ONCE
Status: COMPLETED | OUTPATIENT
Start: 2019-12-05 | End: 2019-12-05

## 2019-12-05 RX ORDER — SODIUM CHLORIDE 9 MG/ML
250 INJECTION, SOLUTION INTRAVENOUS AS NEEDED
Status: DISCONTINUED | OUTPATIENT
Start: 2019-12-05 | End: 2019-12-07 | Stop reason: HOSPADM

## 2019-12-05 RX ORDER — SODIUM CHLORIDE 9 MG/ML
INJECTION, SOLUTION INTRAVENOUS
Status: COMPLETED
Start: 2019-12-05 | End: 2019-12-05

## 2019-12-05 RX ORDER — BENZONATATE 200 MG/1
CAPSULE ORAL
Qty: 20 CAPSULE | Refills: 0 | Status: SHIPPED | OUTPATIENT
Start: 2019-12-05 | End: 2019-12-18 | Stop reason: SDUPTHER

## 2019-12-05 RX ORDER — ACETAMINOPHEN 325 MG/1
650 TABLET ORAL ONCE
Status: COMPLETED | OUTPATIENT
Start: 2019-12-05 | End: 2019-12-05

## 2019-12-05 RX ORDER — ACETAMINOPHEN 325 MG/1
TABLET ORAL
Status: COMPLETED
Start: 2019-12-05 | End: 2019-12-05

## 2019-12-05 RX ADMIN — Medication 25 MG: at 09:15

## 2019-12-05 RX ADMIN — DIPHENHYDRAMINE HYDROCHLORIDE 25 MG: 25 CAPSULE ORAL at 09:15

## 2019-12-05 RX ADMIN — ACETAMINOPHEN 650 MG: 325 TABLET ORAL at 09:16

## 2019-12-05 RX ADMIN — SODIUM CHLORIDE 250 ML: 9 INJECTION, SOLUTION INTRAVENOUS at 12:00

## 2019-12-05 RX ADMIN — ACETAMINOPHEN 650 MG: 325 TABLET, FILM COATED ORAL at 09:16

## 2019-12-05 NOTE — NURSING NOTE
1545 PATIENT HERE ON ACC TODAY & RECEIVED 2 UNITS PRBC'S WITHOUT COMPLICATION. DISCHARGED VIA W/C WITH SON.

## 2019-12-05 NOTE — PATIENT INSTRUCTIONS
"  Call Dr. Elieser Headley, [facility] at [phone] if you have any problems or concerns.    We know you have a Choice in healthcare and appreciate you using [facility].  Our purpose is to provide you \"Excellent Care\".  We hope that you will always choose us in the future and continue to recommend us to your family and friends.              Blood Transfusion, Adult, Care After  This sheet gives you information about how to care for yourself after your procedure. Your doctor may also give you more specific instructions. If you have problems or questions, contact your doctor.  Follow these instructions at home:    · Take over-the-counter and prescription medicines only as told by your doctor.  · Go back to your normal activities as told by your doctor.  · Follow instructions from your doctor about how to take care of the area where an IV tube was put into your vein (insertion site). Make sure you:  ? Wash your hands with soap and water before you change your bandage (dressing). If there is no soap and water, use hand .  ? Change your bandage as told by your doctor.  · Check your IV insertion site every day for signs of infection. Check for:  ? More redness, swelling, or pain.  ? More fluid or blood.  ? Warmth.  ? Pus or a bad smell.  Contact a doctor if:  · You have more redness, swelling, or pain around the IV insertion site.  · You have more fluid or blood coming from the IV insertion site.  · Your IV insertion site feels warm to the touch.  · You have pus or a bad smell coming from the IV insertion site.  · Your pee (urine) turns pink, red, or brown.  · You feel weak after doing your normal activities.  Get help right away if:  · You have signs of a serious allergic or body defense (immune) system reaction, including:  ? Itchiness.  ? Hives.  ? Trouble breathing.  ? Anxiety.  ? Pain in your chest or lower back.  ? Fever, flushing, and chills.  ? Fast pulse.  ? Rash.  ? Watery poop " (diarrhea).  ? Throwing up (vomiting).  ? Dark pee.  ? Serious headache.  ? Dizziness.  ? Stiff neck.  ? Yellow color in your face or the white parts of your eyes (jaundice).  Summary  · After a blood transfusion, return to your normal activities as told by your doctor.  · Every day, check for signs of infection where the IV tube was put into your vein.  · Some signs of infection are warm skin, more redness and pain, more fluid or blood, and pus or a bad smell where the needle went in.  · Contact your doctor if you feel weak or have any unusual symptoms.  This information is not intended to replace advice given to you by your health care provider. Make sure you discuss any questions you have with your health care provider.  Document Released: 01/08/2016 Document Revised: 08/11/2017 Document Reviewed: 08/11/2017  [employer] Interactive Patient Education © 2019 [employer]

## 2019-12-10 ENCOUNTER — APPOINTMENT (OUTPATIENT)
Dept: ULTRASOUND IMAGING | Facility: HOSPITAL | Age: 79
End: 2019-12-10

## 2019-12-10 ENCOUNTER — HOSPITAL ENCOUNTER (EMERGENCY)
Facility: HOSPITAL | Age: 79
Discharge: HOME | End: 2019-12-10
Attending: EMERGENCY MEDICINE | Admitting: EMERGENCY MEDICINE

## 2019-12-10 VITALS
RESPIRATION RATE: 16 BRPM | BODY MASS INDEX: 36.8 KG/M2 | HEART RATE: 87 BPM | DIASTOLIC BLOOD PRESSURE: 62 MMHG | HEIGHT: 62 IN | OXYGEN SATURATION: 93 % | SYSTOLIC BLOOD PRESSURE: 138 MMHG | WEIGHT: 200 LBS | TEMPERATURE: 98.4 F

## 2019-12-10 DIAGNOSIS — I82.401 ACUTE DEEP VEIN THROMBOSIS (DVT) OF RIGHT LOWER EXTREMITY, UNSPECIFIED VEIN (HCC): Primary | ICD-10-CM

## 2019-12-10 LAB
ALBUMIN SERPL-MCNC: 3.6 G/DL (ref 3.5–5.2)
ALBUMIN/GLOB SERPL: 1.2 G/DL
ALP SERPL-CCNC: 103 U/L (ref 39–117)
ALT SERPL W P-5'-P-CCNC: 5 U/L (ref 1–33)
ANION GAP SERPL CALCULATED.3IONS-SCNC: 10.7 MMOL/L (ref 5–15)
APTT PPP: 30.3 SECONDS (ref 24.3–38.1)
AST SERPL-CCNC: 10 U/L (ref 1–32)
BASOPHILS # BLD AUTO: 0.11 10*3/MM3 (ref 0–0.2)
BASOPHILS NFR BLD AUTO: 1.4 % (ref 0–1.5)
BILIRUB SERPL-MCNC: 0.4 MG/DL (ref 0.2–1.2)
BUN BLD-MCNC: 30 MG/DL (ref 8–23)
BUN/CREAT SERPL: 17.2 (ref 7–25)
CALCIUM SPEC-SCNC: 8.7 MG/DL (ref 8.6–10.5)
CHLORIDE SERPL-SCNC: 101 MMOL/L (ref 98–107)
CO2 SERPL-SCNC: 24.3 MMOL/L (ref 22–29)
CREAT BLD-MCNC: 1.74 MG/DL (ref 0.57–1)
DEPRECATED RDW RBC AUTO: 83.5 FL (ref 37–54)
EOSINOPHIL # BLD AUTO: 0.57 10*3/MM3 (ref 0–0.4)
EOSINOPHIL NFR BLD AUTO: 7.3 % (ref 0.3–6.2)
ERYTHROCYTE [DISTWIDTH] IN BLOOD BY AUTOMATED COUNT: 22.9 % (ref 12.3–15.4)
GFR SERPL CREATININE-BSD FRML MDRD: 28 ML/MIN/1.73
GLOBULIN UR ELPH-MCNC: 3 GM/DL
GLUCOSE BLD-MCNC: 187 MG/DL (ref 65–99)
HCT VFR BLD AUTO: 29.7 % (ref 34–46.6)
HGB BLD-MCNC: 9.7 G/DL (ref 12–15.9)
IMM GRANULOCYTES # BLD AUTO: 0.18 10*3/MM3 (ref 0–0.05)
IMM GRANULOCYTES NFR BLD AUTO: 2.3 % (ref 0–0.5)
INR PPP: 1.17 (ref 0.9–1.1)
LYMPHOCYTES # BLD AUTO: 2.28 10*3/MM3 (ref 0.7–3.1)
LYMPHOCYTES NFR BLD AUTO: 29.2 % (ref 19.6–45.3)
MCH RBC QN AUTO: 33.7 PG (ref 26.6–33)
MCHC RBC AUTO-ENTMCNC: 32.7 G/DL (ref 31.5–35.7)
MCV RBC AUTO: 103.1 FL (ref 79–97)
MONOCYTES # BLD AUTO: 0.25 10*3/MM3 (ref 0.1–0.9)
MONOCYTES NFR BLD AUTO: 3.2 % (ref 5–12)
NEUTROPHILS # BLD AUTO: 4.41 10*3/MM3 (ref 1.7–7)
NEUTROPHILS NFR BLD AUTO: 56.6 % (ref 42.7–76)
NRBC BLD AUTO-RTO: 0 /100 WBC (ref 0–0.2)
PLATELET # BLD AUTO: 355 10*3/MM3 (ref 140–450)
PMV BLD AUTO: 11.4 FL (ref 6–12)
POTASSIUM BLD-SCNC: 4.4 MMOL/L (ref 3.5–5.2)
PROT SERPL-MCNC: 6.6 G/DL (ref 6–8.5)
PROTHROMBIN TIME: 14.7 SECONDS (ref 12.1–15)
RBC # BLD AUTO: 2.88 10*6/MM3 (ref 3.77–5.28)
SODIUM BLD-SCNC: 136 MMOL/L (ref 136–145)
WBC NRBC COR # BLD: 7.8 10*3/MM3 (ref 3.4–10.8)

## 2019-12-10 PROCEDURE — 99284 EMERGENCY DEPT VISIT MOD MDM: CPT | Performed by: EMERGENCY MEDICINE

## 2019-12-10 PROCEDURE — 93010 ELECTROCARDIOGRAM REPORT: CPT | Performed by: INTERNAL MEDICINE

## 2019-12-10 PROCEDURE — 85610 PROTHROMBIN TIME: CPT | Performed by: EMERGENCY MEDICINE

## 2019-12-10 PROCEDURE — 93005 ELECTROCARDIOGRAM TRACING: CPT | Performed by: EMERGENCY MEDICINE

## 2019-12-10 PROCEDURE — 85025 COMPLETE CBC W/AUTO DIFF WBC: CPT | Performed by: EMERGENCY MEDICINE

## 2019-12-10 PROCEDURE — 93971 EXTREMITY STUDY: CPT

## 2019-12-10 PROCEDURE — 80053 COMPREHEN METABOLIC PANEL: CPT | Performed by: EMERGENCY MEDICINE

## 2019-12-10 PROCEDURE — 85730 THROMBOPLASTIN TIME PARTIAL: CPT | Performed by: EMERGENCY MEDICINE

## 2019-12-10 PROCEDURE — 99283 EMERGENCY DEPT VISIT LOW MDM: CPT

## 2019-12-10 RX ORDER — ONDANSETRON HYDROCHLORIDE 8 MG/1
TABLET, FILM COATED ORAL
Qty: 15 TABLET | Refills: 0 | Status: SHIPPED | OUTPATIENT
Start: 2019-12-10 | End: 2020-01-15 | Stop reason: SDUPTHER

## 2019-12-10 RX ADMIN — APIXABAN 10 MG: 2.5 TABLET, FILM COATED ORAL at 17:04

## 2019-12-10 NOTE — ED PROVIDER NOTES
"Subjective     Leg Pain   Location:  Leg (rt leg \"knot\", h/o DVt off xarelto)  Leg location:  R leg  Pain details:     Quality:  Aching    Severity:  Mild    Onset quality:  Gradual    Timing:  Constant  Relieved by:  Nothing  Worsened by:  Nothing  Ineffective treatments:  None tried  Associated symptoms: no decreased ROM, no fever, no numbness, no swelling and no tingling        Review of Systems   Constitutional: Negative for fever.   Respiratory: Negative for chest tightness and shortness of breath.    Cardiovascular: Negative for chest pain and leg swelling.   All other systems reviewed and are negative.      Past Medical History:   Diagnosis Date   • Allergic rhinitis    • Anemia    • Anxiety    • Appetite absent    • Arthritis    • Asthma    • Back pain    • Bell's palsy    • Black tarry stools    • Blood in stool    • Chronic fatigue    • CKD (chronic kidney disease) stage 3, GFR 30-59 ml/min (CMS/[facility])    • Community acquired pneumonia of left lung 10/25/2018   • Cough    • Depression    • [place]     LAST A1C 6   • Diabetic gastroparesis (CMS/HCC) 2/19/2016   • Difficulty walking    • Excessive urination at night    • Frequent urination    • GERD (gastroesophageal reflux disease)    • GI bleed    • Gout    • H/O blood clots     LEFT LEG 7 OR 8 YEARS AGO   • Heat intolerance    • History of fall 10/2018   • History of prior pregnancies     x8, miscarriage 5   • History of transfusion 11/2018    due to anemia   • Hyperlipidemia    • Hypertension    • Hypothyroidism    • Normal coronary arteries     by cath 2013   • Orthostatic hypotension    • AARON (obstructive sleep apnea)     DOESNT WEAR REGULARLY   • PONV (postoperative nausea and vomiting)    • Skin cancer    • Stroke (CMS/[facility])     Several mini-strokes   • TIA (transient ischemic attack)     LAST TIA JULY 2017   • Urination pain    • UTI (urinary tract infection)     Dec 2018 and Jan 2019       Allergies   Allergen Reactions   • " Baclofen Anxiety     Panic attack, nightmares   • Eliquis [Apixaban] Anaphylaxis   • Codeine Itching and Rash   • Lisinopril Cough   • Morphine Hives   • Penicillins Rash     Tolerates cephalosporins        Past Surgical History:   Procedure Laterality Date   • BACK SURGERY      HARDWARE   • CHOLECYSTECTOMY      OPEN   • COLONOSCOPY  2011    due for repeat in 2021   • COLONOSCOPY N/A 10/28/2018    Procedure: COLONOSCOPY;  Surgeon: Emmanuel Rogers MD;  Location:  [facility] OR;  Service: Gastroenterology   • ENDOSCOPY N/A 10/26/2018    Procedure: ESOPHAGOGASTRODUODENOSCOPY;  Surgeon: Emmanuel Rogers MD;  Location: [facility] OR;  Service: Gastroenterology   • HYSTERECTOMY      PARTIAL    • KNEE SURGERY     • NECK SURGERY     • SPINE SURGERY     • TUMOR REMOVAL Left     Leg   • UPPER GASTROINTESTINAL ENDOSCOPY  2014    gastritis.  done by dr. hastings       Family History   Problem Relation Age of Onset   • Lupus Mother    • Heart failure Mother 59   • Heart disease Other    • Hypertension Other    • Heart attack Father    • Breast cancer Neg Hx        Social History     Socioeconomic History   • Marital status:      Spouse name: Not on file   • Number of children: 3   • Years of education: High School   • Highest education level: Not on file   Occupational History     Employer: RETIRED   Tobacco Use   • Smoking status: Never Smoker   • Smokeless tobacco: Never Used   • Tobacco comment: CAFFEINE USE: NONE   Substance and Sexual Activity   • Alcohol use: No   • Drug use: No   • Sexual activity: Defer     Comment: EXERCISE - RARELY           Objective   Physical Exam   Constitutional: She appears well-developed and well-nourished. No distress.   Cardiovascular: Normal rate and regular rhythm.   No murmur heard.  Pulmonary/Chest: Effort normal and breath sounds normal. No respiratory distress.   Musculoskeletal: Normal range of motion. She exhibits tenderness. She exhibits no edema or deformity.   Rt  lower medial leg subq tender nodule and faint overlying eccymosis  Left leg normal exam   Neurological: No cranial nerve deficit or sensory deficit. She exhibits normal muscle tone. Coordination normal.   Skin: Skin is warm. Capillary refill takes less than 2 seconds. She is not diaphoretic. No erythema.   Psychiatric: She has a normal mood and affect.   Nursing note and vitals reviewed.      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  Denies any melana/brbpr or active bleeding  Ok with plan to restaert eloquis and f/u pcp this week             No data recorded                        MDM  Number of Diagnoses or Management Options  Acute deep vein thrombosis (DVT) of right lower extremity, unspecified vein (CMS/HCC):      Amount and/or Complexity of Data Reviewed  Clinical lab tests: ordered and reviewed  Tests in the radiology section of CPT®: ordered and reviewed    Risk of Complications, Morbidity, and/or Mortality  Presenting problems: moderate  Diagnostic procedures: moderate  Management options: moderate    Patient Progress  Patient progress: stable      Final diagnoses:   Acute deep vein thrombosis (DVT) of right lower extremity, unspecified vein (CMS/HCC)              Sebastien Rodriguez MD  12/10/19 1642       Sebastien Rodriguez MD  12/10/19 1656

## 2019-12-10 NOTE — ED NOTES
Call placed to Dr. Headley's office. Dr. Headley is to return the call.      Liyah Lynn  12/10/19 0289

## 2019-12-11 ENCOUNTER — INFUSION (OUTPATIENT)
Dept: ONCOLOGY | Facility: HOSPITAL | Age: 79
End: 2019-12-11

## 2019-12-11 ENCOUNTER — LAB (OUTPATIENT)
Dept: LAB | Facility: HOSPITAL | Age: 79
End: 2019-12-11

## 2019-12-11 VITALS
TEMPERATURE: 97.9 F | DIASTOLIC BLOOD PRESSURE: 58 MMHG | SYSTOLIC BLOOD PRESSURE: 120 MMHG | HEART RATE: 74 BPM | OXYGEN SATURATION: 93 %

## 2019-12-11 DIAGNOSIS — N18.30 CKD STAGE 3 DUE TO TYPE 2 DIABETES MELLITUS (HCC): ICD-10-CM

## 2019-12-11 DIAGNOSIS — D64.9 ANEMIA REQUIRING TRANSFUSIONS: Primary | ICD-10-CM

## 2019-12-11 DIAGNOSIS — D46.C MYELODYSPLASTIC SYNDROME WITH 5Q DELETION (HCC): ICD-10-CM

## 2019-12-11 DIAGNOSIS — D64.9 ANEMIA REQUIRING TRANSFUSIONS: ICD-10-CM

## 2019-12-11 DIAGNOSIS — E11.22 CKD STAGE 3 DUE TO TYPE 2 DIABETES MELLITUS (HCC): ICD-10-CM

## 2019-12-11 PROCEDURE — 25010000002 EPOETIN ALFA PER 1000 UNITS: Performed by: NURSE PRACTITIONER

## 2019-12-11 PROCEDURE — 96372 THER/PROPH/DIAG INJ SC/IM: CPT | Performed by: NURSE PRACTITIONER

## 2019-12-11 RX ADMIN — ERYTHROPOIETIN 60000 UNITS: 40000 INJECTION, SOLUTION INTRAVENOUS; SUBCUTANEOUS at 10:06

## 2019-12-12 ENCOUNTER — TELEPHONE (OUTPATIENT)
Dept: ORTHOPEDIC SURGERY | Facility: CLINIC | Age: 79
End: 2019-12-12

## 2019-12-12 ENCOUNTER — PATIENT OUTREACH (OUTPATIENT)
Dept: CASE MANAGEMENT | Facility: OTHER | Age: 79
End: 2019-12-12

## 2019-12-12 NOTE — OUTREACH NOTE
Care Plan Note      Responses   Lifestyle Goals  Routine follow-up with doctor(s), Less pain, Increase physical activity, Fewer ER/urgent care visits, Medication management, Routine eye exam, Routine foot care   Barriers  Pain, Side effects of medication   Self Management  Medication Adherence, Get flu/pneumonia shot   Suggested Appointments  Other (See Comment) [Keep apointment with Dr. Mercado on Wednesday 12/18/2019. ]   Annual Wellness Visit:   Patient Has Completed   Care Gaps Addressed  Diabetic Eye Exam, Pneumonia Vaccine   Diabetic Eye Exam Status  Patient will Complete   Pneumonia Vaccine Status  Patient will Complete   Specific Disease Process Teaching  -- [Take Tylenol for pain. Avoid NSAIDS. Take Eliquis as prescribed. Follow up with Dr. Mercado. Watch for S/S of bleeding (tarry stools, coffeee ground emesis, nose bleed, ect). ]   Other Patient Education/Resources   -- [[employer] Healthy Planning and Resource Line ]   Does patient have depression diagnosis?  Yes   Advanced Directives:  Patient Has   Ed Visits past 12 months:  1   Hospitalizations past 12 months  1   Discharged From:  [facility]   Discharged to:  home   Discharge destination:  Home   Medication Adherence  Medications understood [Patient states she was believed to have a GI bleed on Eliquis, but was placed back on the medication with the  DVT diagnosis.  Patient aware to watch for bleeding. . ]   Goal Progress  Making Progress Toward Goal(s)   Readiness Scale  7   Confidence Scale  7   Health Literacy  Good   Patient Outreach Summary (AVS)  Send Outreach Summary        The main concerns and/or symptoms the patient would like to address are: Spoke to patient regarding recent ED visit due to DVT. Patient states she is taking Eliquis as prescribed. She has a follow up with Dr. Mercado scheduled for Wednesday 12/18/2019. Patient reports pain with standing and walking, but she states she is continuing to ambulate. Patient states she previously was believed to  have a GI bleed on Eliquis, but is now back on the medication due to the DVT.     Education/instruction provided by Care Coordinator: Introduced self, explained CA role and provided contact information. Reviewed follow up appointment. Patient states she was told she could wait until next Wednesday. Reviewed pain control (Tyelnol as needed for pain). Patient agreed to the [employer] Planning and Support Line (sent via mail). AD on file. Mychart pending. AWV complete. Reminder to complete Diabetic Eye Exam and Pneumonia vaccine sent via mail.     Follow Up Outreach Due: as needed    Roxann Torres RN  Ambulatory     12/12/2019, 3:59 PM

## 2019-12-12 NOTE — PATIENT INSTRUCTIONS
Remember to complete your Diabetic Eye Exam and to update your Pneumonia vaccine as soon as possible.     Reviewed [facility] Healthy Living and Planning Line number [phone].

## 2019-12-17 DIAGNOSIS — E03.9 ACQUIRED HYPOTHYROIDISM: ICD-10-CM

## 2019-12-17 DIAGNOSIS — M25.561 ACUTE PAIN OF RIGHT KNEE: ICD-10-CM

## 2019-12-17 DIAGNOSIS — K21.9 GASTROESOPHAGEAL REFLUX DISEASE, ESOPHAGITIS PRESENCE NOT SPECIFIED: ICD-10-CM

## 2019-12-17 DIAGNOSIS — E78.2 MIXED HYPERLIPIDEMIA: Primary | ICD-10-CM

## 2019-12-17 DIAGNOSIS — F32.A ANXIETY AND DEPRESSION: ICD-10-CM

## 2019-12-17 DIAGNOSIS — F41.9 ANXIETY AND DEPRESSION: ICD-10-CM

## 2019-12-17 RX ORDER — OMEPRAZOLE 40 MG/1
CAPSULE, DELAYED RELEASE ORAL
Qty: 90 CAPSULE | Refills: 1 | Status: SHIPPED | OUTPATIENT
Start: 2019-12-17 | End: 2020-05-06

## 2019-12-17 RX ORDER — DESVENLAFAXINE SUCCINATE 50 MG/1
TABLET, EXTENDED RELEASE ORAL
Qty: 30 TABLET | Refills: 0 | Status: SHIPPED | OUTPATIENT
Start: 2019-12-17 | End: 2020-01-14

## 2019-12-17 RX ORDER — LEVOTHYROXINE SODIUM 88 UG/1
TABLET ORAL
Qty: 90 TABLET | Refills: 1 | Status: SHIPPED | OUTPATIENT
Start: 2019-12-17 | End: 2020-05-06

## 2019-12-17 RX ORDER — GABAPENTIN 400 MG/1
CAPSULE ORAL
Qty: 120 CAPSULE | Refills: 0 | Status: SHIPPED | OUTPATIENT
Start: 2019-12-17 | End: 2020-01-15

## 2019-12-18 ENCOUNTER — INFUSION (OUTPATIENT)
Dept: ONCOLOGY | Facility: HOSPITAL | Age: 79
End: 2019-12-18

## 2019-12-18 ENCOUNTER — LAB (OUTPATIENT)
Dept: LAB | Facility: HOSPITAL | Age: 79
End: 2019-12-18

## 2019-12-18 ENCOUNTER — OFFICE VISIT (OUTPATIENT)
Dept: INTERNAL MEDICINE | Facility: CLINIC | Age: 79
End: 2019-12-18

## 2019-12-18 ENCOUNTER — TELEPHONE (OUTPATIENT)
Dept: INTERNAL MEDICINE | Facility: CLINIC | Age: 79
End: 2019-12-18

## 2019-12-18 VITALS
SYSTOLIC BLOOD PRESSURE: 138 MMHG | OXYGEN SATURATION: 98 % | HEART RATE: 80 BPM | RESPIRATION RATE: 18 BRPM | HEIGHT: 62 IN | DIASTOLIC BLOOD PRESSURE: 70 MMHG | BODY MASS INDEX: 37.36 KG/M2 | WEIGHT: 203 LBS

## 2019-12-18 DIAGNOSIS — E11.22 CKD STAGE 3 DUE TO TYPE 2 DIABETES MELLITUS (HCC): ICD-10-CM

## 2019-12-18 DIAGNOSIS — I50.32 CHRONIC DIASTOLIC CHF (CONGESTIVE HEART FAILURE) (HCC): ICD-10-CM

## 2019-12-18 DIAGNOSIS — N39.0 RECURRENT UTI: ICD-10-CM

## 2019-12-18 DIAGNOSIS — N18.30 CKD STAGE 3 DUE TO TYPE 2 DIABETES MELLITUS (HCC): ICD-10-CM

## 2019-12-18 DIAGNOSIS — I95.1 ORTHOSTATIC HYPOTENSION: ICD-10-CM

## 2019-12-18 DIAGNOSIS — E11.22 CKD STAGE 3 DUE TO TYPE 2 DIABETES MELLITUS (HCC): Primary | ICD-10-CM

## 2019-12-18 DIAGNOSIS — N18.30 CKD STAGE 3 DUE TO TYPE 2 DIABETES MELLITUS (HCC): Primary | ICD-10-CM

## 2019-12-18 DIAGNOSIS — I82.499 DEEP VEIN THROMBOSIS (DVT) OF OTHER VEIN OF LOWER EXTREMITY, UNSPECIFIED CHRONICITY, UNSPECIFIED LATERALITY (HCC): ICD-10-CM

## 2019-12-18 DIAGNOSIS — D64.9 ANEMIA REQUIRING TRANSFUSIONS: ICD-10-CM

## 2019-12-18 DIAGNOSIS — Z79.4 TYPE 2 DIABETES MELLITUS WITH DIABETIC NEUROPATHY, WITH LONG-TERM CURRENT USE OF INSULIN (HCC): Primary | ICD-10-CM

## 2019-12-18 DIAGNOSIS — E11.40 TYPE 2 DIABETES MELLITUS WITH DIABETIC NEUROPATHY, WITH LONG-TERM CURRENT USE OF INSULIN (HCC): Primary | ICD-10-CM

## 2019-12-18 DIAGNOSIS — D46.C MYELODYSPLASTIC SYNDROME WITH 5Q DELETION (HCC): ICD-10-CM

## 2019-12-18 DIAGNOSIS — E10.9 BRITTLE DIABETES MELLITUS (HCC): ICD-10-CM

## 2019-12-18 DIAGNOSIS — E16.2 HYPOGLYCEMIA: ICD-10-CM

## 2019-12-18 LAB
BASOPHILS # BLD AUTO: 0.09 10*3/MM3 (ref 0–0.2)
BASOPHILS NFR BLD AUTO: 1.5 % (ref 0–1.5)
DEPRECATED RDW RBC AUTO: 84.6 FL (ref 37–54)
EOSINOPHIL # BLD AUTO: 0.42 10*3/MM3 (ref 0–0.4)
EOSINOPHIL NFR BLD AUTO: 6.8 % (ref 0.3–6.2)
ERYTHROCYTE [DISTWIDTH] IN BLOOD BY AUTOMATED COUNT: 23.6 % (ref 12.3–15.4)
HCT VFR BLD AUTO: 31.2 % (ref 34–46.6)
HGB BLD-MCNC: 10.3 G/DL (ref 12–15.9)
IMM GRANULOCYTES # BLD AUTO: 0.1 10*3/MM3 (ref 0–0.05)
IMM GRANULOCYTES NFR BLD AUTO: 1.6 % (ref 0–0.5)
LYMPHOCYTES # BLD AUTO: 2.22 10*3/MM3 (ref 0.7–3.1)
LYMPHOCYTES NFR BLD AUTO: 36 % (ref 19.6–45.3)
MCH RBC QN AUTO: 34 PG (ref 26.6–33)
MCHC RBC AUTO-ENTMCNC: 33 G/DL (ref 31.5–35.7)
MCV RBC AUTO: 103 FL (ref 79–97)
MONOCYTES # BLD AUTO: 0.29 10*3/MM3 (ref 0.1–0.9)
MONOCYTES NFR BLD AUTO: 4.7 % (ref 5–12)
NEUTROPHILS # BLD AUTO: 3.04 10*3/MM3 (ref 1.7–7)
NEUTROPHILS NFR BLD AUTO: 49.4 % (ref 42.7–76)
NRBC BLD AUTO-RTO: 0 /100 WBC (ref 0–0.2)
PLATELET # BLD AUTO: 431 10*3/MM3 (ref 140–450)
PMV BLD AUTO: 11.2 FL (ref 6–12)
RBC # BLD AUTO: 3.03 10*6/MM3 (ref 3.77–5.28)
WBC NRBC COR # BLD: 6.16 10*3/MM3 (ref 3.4–10.8)

## 2019-12-18 PROCEDURE — 99214 OFFICE O/P EST MOD 30 MIN: CPT | Performed by: INTERNAL MEDICINE

## 2019-12-18 PROCEDURE — 85025 COMPLETE CBC W/AUTO DIFF WBC: CPT | Performed by: INTERNAL MEDICINE

## 2019-12-18 PROCEDURE — 36415 COLL VENOUS BLD VENIPUNCTURE: CPT

## 2019-12-18 RX ORDER — BENZONATATE 200 MG/1
200 CAPSULE ORAL 3 TIMES DAILY PRN
Qty: 20 CAPSULE | Refills: 2 | Status: ON HOLD | OUTPATIENT
Start: 2019-12-18 | End: 2020-07-29

## 2019-12-18 RX ORDER — ALBUTEROL SULFATE 90 UG/1
2 AEROSOL, METERED RESPIRATORY (INHALATION) EVERY 4 HOURS PRN
Qty: 1 INHALER | Refills: 3 | Status: SHIPPED | OUTPATIENT
Start: 2019-12-18 | End: 2020-12-29

## 2019-12-18 RX ORDER — FLASH GLUCOSE SCANNING READER
1 EACH MISCELLANEOUS
Qty: 1 DEVICE | Refills: 0 | Status: SHIPPED | OUTPATIENT
Start: 2019-12-18 | End: 2020-08-26

## 2019-12-18 RX ORDER — FLASH GLUCOSE SENSOR
1 KIT MISCELLANEOUS
Qty: 2 EACH | Refills: 11 | Status: SHIPPED | OUTPATIENT
Start: 2019-12-18 | End: 2020-08-26

## 2019-12-18 NOTE — PROGRESS NOTES
Joya Singh is a 79 y.o. female, who presents with a chief complaint of   Chief Complaint   Patient presents with   • Chronic Kidney Disease       HPI   Pt here for follow up    Hx recurrent dvt - pt was previously on warfarin and eliquis.  This was stopped bc of gi bleeding and myelodysplastic syndrome.  Pt had required multiple transfusions.  She is now back on eliquis.  ED records from 12/10 reviewed.      Recurrent UTI - pt has had 4 positive cultures in the past 11 months.  She is asking about a daily prophylactic antibiotic.  We had a long discussion about the RBA of daily abx vs aggressive treatment of infections.  We discussed antibiotic resistance.  When she does get sick she is very ill and has lots of incontinence.  She is immunocompromised bc of her myelodysplastic syndrome.      Myelodysplastic syndrome - last blood transfusion was 12/5/19.  She is still on high dose Epogen weekly. She sees dr. Headley again in January.      DM - pt on insulin.  She has trouble remembering her insulin doses.  She is supposed to be on tresiba and novolog 15 units tid.  The other day she gave herself 20 units of novalog and had a low glucose.      The following portions of the patient's history were reviewed and updated as appropriate: allergies, current medications, past family history, past medical history, past social history, past surgical history and problem list.    Allergies: Baclofen; Eliquis [apixaban]; Codeine; Lisinopril; Morphine; and Penicillins    Review of Systems   Constitutional: Positive for fatigue.   HENT: Negative.    Eyes: Negative.    Respiratory: Negative.    Cardiovascular: Negative.    Gastrointestinal: Negative.    Endocrine: Negative.    Genitourinary: Negative.    Musculoskeletal:        Right leg pain.   Skin: Negative.    Allergic/Immunologic: Negative.    Neurological: Negative.    Hematological: Negative.    Psychiatric/Behavioral: Negative.    All other systems reviewed and are  negative.            Wt Readings from Last 3 Encounters:   12/18/19 92.1 kg (203 lb)   12/10/19 90.7 kg (200 lb)   12/05/19 92.1 kg (203 lb 0.7 oz)     Temp Readings from Last 3 Encounters:   12/11/19 97.9 °F (36.6 °C)   12/10/19 98.4 °F (36.9 °C) (Oral)   12/05/19 98.4 °F (36.9 °C)     BP Readings from Last 3 Encounters:   12/18/19 138/70   12/11/19 120/58   12/10/19 138/62     Pulse Readings from Last 3 Encounters:   12/18/19 80   12/11/19 74   12/10/19 87     Body mass index is 37.13 kg/m².  @LASTSAO2(3)@    Physical Exam   Constitutional: She is oriented to person, place, and time. She appears well-developed and well-nourished. No distress.   HENT:   Head: Normocephalic and atraumatic.   Right Ear: External ear normal.   Left Ear: External ear normal.   Nose: Nose normal.   Mouth/Throat: Oropharynx is clear and moist.   Eyes: Pupils are equal, round, and reactive to light. Conjunctivae and EOM are normal.   Neck: Normal range of motion. Neck supple.   Cardiovascular: Normal rate, regular rhythm, normal heart sounds and intact distal pulses.   Pulmonary/Chest: Effort normal and breath sounds normal. No respiratory distress. She has no wheezes.   Musculoskeletal: Normal range of motion.   Normal gait   Neurological: She is alert and oriented to person, place, and time.   Skin: Skin is warm and dry.   Psychiatric: She has a normal mood and affect. Her behavior is normal. Judgment and thought content normal.   Nursing note and vitals reviewed.      Results for orders placed or performed in visit on 12/18/19   CBC Auto Differential   Result Value Ref Range    WBC 6.16 3.40 - 10.80 10*3/mm3    RBC 3.03 (L) 3.77 - 5.28 10*6/mm3    Hemoglobin 10.3 (L) 12.0 - 15.9 g/dL    Hematocrit 31.2 (L) 34.0 - 46.6 %    .0 (H) 79.0 - 97.0 fL    MCH 34.0 (H) 26.6 - 33.0 pg    MCHC 33.0 31.5 - 35.7 g/dL    RDW 23.6 (H) 12.3 - 15.4 %    RDW-SD 84.6 (H) 37.0 - 54.0 fl    MPV 11.2 6.0 - 12.0 fL    Platelets 431 140 - 450 10*3/mm3     Neutrophil % 49.4 42.7 - 76.0 %    Lymphocyte % 36.0 19.6 - 45.3 %    Monocyte % 4.7 (L) 5.0 - 12.0 %    Eosinophil % 6.8 (H) 0.3 - 6.2 %    Basophil % 1.5 0.0 - 1.5 %    Immature Grans % 1.6 (H) 0.0 - 0.5 %    Neutrophils, Absolute 3.04 1.70 - 7.00 10*3/mm3    Lymphocytes, Absolute 2.22 0.70 - 3.10 10*3/mm3    Monocytes, Absolute 0.29 0.10 - 0.90 10*3/mm3    Eosinophils, Absolute 0.42 (H) 0.00 - 0.40 10*3/mm3    Basophils, Absolute 0.09 0.00 - 0.20 10*3/mm3    Immature Grans, Absolute 0.10 (H) 0.00 - 0.05 10*3/mm3    nRBC 0.0 0.0 - 0.2 /100 WBC     *Note: Due to a large number of results and/or encounters for the requested time period, some results have not been displayed. A complete set of results can be found in Results Review.           Joya was seen today for chronic kidney disease.    Diagnoses and all orders for this visit:    Type 2 diabetes mellitus with diabetic neuropathy, with long-term current use of insulin (CMS/[facility]) - freestyle nba orders placed bc of pt's recurrent hypoglycemia and uncontrolled diabetes.   Cont to monitor glucoses very closely.  Dosing reviewed.  No a1c today bc of pt's recent blood transfusion.     Deep vein thrombosis (DVT) of other vein of lower extremity, unspecified chronicity, unspecified laterality (CMS/[facility]) - cont eliquis indefinitely although pt very high risk off or on anticoagulation.  Monitor closely for signs/sx of GI bleeding.     Orthostatic hypotension    Chronic diastolic CHF (congestive heart failure) (CMS/[facility])    CKD stage 3 due to type 2 diabetes mellitus (CMS/[facility])    Myelodysplastic syndrome with 5q deletion (CMS/[facility])    Recurrent UTI  I will discuss recurrent uti's and possible prophylactic abx with dr. Headley.      Other orders  -     albuterol sulfate  (90 Base) MCG/ACT inhaler; Inhale 2 puffs Every 4 (Four) Hours As Needed for Wheezing.            Outpatient Medications Prior to Visit   Medication Sig Dispense Refill   • ACCU-CHEK FASTCLIX LANCETS  misc TEST 3-4 TIMES DAILY AS DIRECTED 400 each 3   • ACCU-CHEK SMARTVIEW test strip TEST BLOOD SUGAR THREE TIMES DAILY OR AS DIRECTED 300 each 3   • acetaminophen (TYLENOL) 325 MG tablet Take 2 tablets by mouth Every 6 (Six) Hours As Needed for Mild Pain . OTC product 40 tablet 0   • Alcohol Swabs (B-D SINGLE USE SWABS REGULAR) pads      • apixaban (ELIQUIS) 5 MG tablet tablet Take 1 tablet by mouth 2 (Two) Times a Day. 60 tablet 2   • atorvastatin (LIPITOR) 10 MG tablet TAKE ONE TABLET BY MOUTH AT BEDTIME 90 tablet 1   • benzonatate (TESSALON) 200 MG capsule TAKE ONE CAPSULE BY MOUTH THREE TIMES DAILY AS NEEDED FOR COUGH 20 capsule 0   • Blood Glucose Monitoring Suppl (ACCU-CHEK KENNETH SMARTVIEW) w/Device kit TEST blood sugar three times daily or as directed 1 kit 0   • Calcium Carbonate-Vit D-Min (CALCIUM 1200) 7329-5642 MG-UNIT chewable tablet Chew 1 tablet Daily.     • cholecalciferol 2000 units tablet Take 2,000 Units by mouth Daily.     • cyclobenzaprine (FLEXERIL) 10 MG tablet TAKE ONE TABLET BY MOUTH TWICE DAILY as needed for muscle spasms 30 tablet 0   • desvenlafaxine (PRISTIQ) 50 MG 24 hr tablet TAKE ONE TABLET BY MOUTH EVERY DAY 30 tablet 0   • diphenhydrAMINE (BENADRYL) 25 mg capsule Take 25 mg by mouth Every 6 (Six) Hours As Needed for Itching.     • docusate sodium (COLACE) 100 MG capsule Take 1 capsule by mouth 2 (Two) Times a Day.     • epoetin chu 96491 UNIT/ML solution 20,000 Units, epoetin chu 00234 UNIT/ML solution 40,000 Units Inject 60,000 Units under the skin into the appropriate area as directed 1 (One) Time Per Week.     • fluticasone (FLONASE) 50 MCG/ACT nasal spray 2 sprays into the nostril(s) as directed by provider Daily. 16 g 0   • furosemide (LASIX) 20 MG tablet TAKE ONE (1) TABLET ORALLY (BY MOUTH) ONCE DAILY 90 tablet 1   • gabapentin (NEURONTIN) 400 MG capsule      • gabapentin (NEURONTIN) 400 MG capsule TAKE ONE CAPSULE BY MOUTH EVERY MORNING, one AT NOON AND TWO AT BEDTIME 120  "capsule 0   • HYDROcodone-acetaminophen (NORCO) 5-325 MG per tablet Take 1 tablet by mouth Every 6 (Six) Hours As Needed for Moderate Pain . 60 tablet 0   • insulin aspart (novoLOG) 100 UNIT/ML injection Inject 15 Units under the skin into the appropriate area as directed 3 (Three) Times a Day With Meals.  12   • Insulin Degludec (TRESIBA FLEXTOUCH) 200 UNIT/ML solution pen-injector Inject 54 Units under the skin into the appropriate area as directed every night at bedtime. 9 mL 11   • lactobacillus acidophilus (RISAQUAD) capsule capsule Take 1 capsule by mouth Daily.     • levothyroxine (SYNTHROID, LEVOTHROID) 88 MCG tablet TAKE ONE TABLET BY MOUTH EVERY DAY 90 tablet 1   • melatonin 5 MG tablet tablet Take 1 tablet by mouth At Night As Needed (insomnia).     • midodrine (PROAMATINE) 2.5 MG tablet Take 1 tablet by mouth 3 (Three) Times a Day. 90 tablet 6   • Multiple Vitamins-Minerals (CENTRUM SILVER PO) Take  by mouth Daily.     • Needle, Disp, (BD DISP NEEDLES) 30G X 1/2\" misc To be used 3 times daily with Novolog Flexpen. 100 each 5   • nitrofurantoin, macrocrystal-monohydrate, (MACROBID) 100 MG capsule Take 1 capsule by mouth 2 (Two) Times a Day. 14 capsule 0   • NYSTATIN 257466 UNIT/GM powder APPLY TOPICALLY 3 TIMES A DAY 60 g 2   • O2 (OXYGEN) Inhale 2 L/min Every Night. Uses 2L  overnight only     • omeprazole (priLOSEC) 40 MG capsule TAKE ONE CAPSULE BY MOUTH EVERY DAY 90 capsule 1   • ondansetron (ZOFRAN) 8 MG tablet TAKE ONE TABLET BY MOUTH every 8 hours as needed for nausea and/or vomitting 15 tablet 0   • ORTHOVISC 30 MG/2ML solution prefilled syringe injection      • polyethylene glycol (MIRALAX) pack packet Take 17 g by mouth Daily.     • potassium chloride (K-DUR) 10 MEQ CR tablet TAKE ONE (1) TABLET ORALLY (BY MOUTH) ONCE DAILY 90 tablet 2   • promethazine (PHENERGAN) 12.5 MG tablet TABLET ONE-HALF TABLET TO ONE TABLET BY MOUTH EVERY 6 HOURS AS NEEDED FOR NAUSEA 20 tablet 0   • ULTICARE MINI PEN " NEEDLES 31G X 6 MM misc USE TO INJECT INSULINS 4 TIMES DAILY AS DIRECTED 400 each 3   • albuterol sulfate  (90 Base) MCG/ACT inhaler Inhale 2 puffs Every 4 (Four) Hours As Needed for Wheezing. 1 inhaler 3   • insulin aspart (novoLOG) 100 UNIT/ML injection Inject 0-9 Units under the skin into the appropriate area as directed 4 (Four) Times a Day Before Meals & at Bedtime.  12     No facility-administered medications prior to visit.      New Medications Ordered This Visit   Medications   • albuterol sulfate  (90 Base) MCG/ACT inhaler     Sig: Inhale 2 puffs Every 4 (Four) Hours As Needed for Wheezing.     Dispense:  1 inhaler     Refill:  3     [unfilled]  Medications Discontinued During This Encounter   Medication Reason   • insulin aspart (novoLOG) 100 UNIT/ML injection Duplicate order   • albuterol sulfate  (90 Base) MCG/ACT inhaler Reorder         Return in about 1 month (around 1/18/2020).

## 2019-12-18 NOTE — PROGRESS NOTES
Procrit not given; pt's hgb today is 10.3. The pt was informed and will return for a CBC recheck with possible procrit on 12/26/19. The pt mentioned that she ran out of Eliquis over the weekend; her last dose of Eliquis was on Saturday, December 14th. The pt said that the clot on her RLL is now back. Nurse s/w Dr. Headley who authorized Eliquis 5mg 1 BID. He had no additional instructions except that the pt should watch for bleeds including GI bleeds. For uncontrolled bleeding, the pt should call 911 or go to the nearest ER; for questions or concerns the pt should call our office. The pt was informed and v/u. Eliquis was e-scribed to [facility].

## 2019-12-20 ENCOUNTER — TELEPHONE (OUTPATIENT)
Dept: INTERNAL MEDICINE | Facility: CLINIC | Age: 79
End: 2019-12-20

## 2019-12-20 RX ORDER — CEPHALEXIN 500 MG/1
500 CAPSULE ORAL 2 TIMES DAILY
Qty: 20 CAPSULE | Refills: 0 | Status: SHIPPED | OUTPATIENT
Start: 2019-12-20 | End: 2019-12-30

## 2019-12-20 NOTE — TELEPHONE ENCOUNTER
Patient calls stating she has another UTI.  Asking for script to be sent to [facility] in [place].  If script can be sent before 6pm she can get today; otherwise it would be Monday before she can get medication.

## 2019-12-26 ENCOUNTER — INFUSION (OUTPATIENT)
Dept: ONCOLOGY | Facility: HOSPITAL | Age: 79
End: 2019-12-26

## 2019-12-26 ENCOUNTER — LAB (OUTPATIENT)
Dept: LAB | Facility: HOSPITAL | Age: 79
End: 2019-12-26

## 2019-12-26 VITALS
SYSTOLIC BLOOD PRESSURE: 141 MMHG | DIASTOLIC BLOOD PRESSURE: 83 MMHG | TEMPERATURE: 97.8 F | OXYGEN SATURATION: 96 % | HEART RATE: 86 BPM

## 2019-12-26 DIAGNOSIS — E11.22 CKD STAGE 3 DUE TO TYPE 2 DIABETES MELLITUS (HCC): ICD-10-CM

## 2019-12-26 DIAGNOSIS — D64.9 ANEMIA REQUIRING TRANSFUSIONS: Primary | ICD-10-CM

## 2019-12-26 DIAGNOSIS — N18.30 CKD STAGE 3 DUE TO TYPE 2 DIABETES MELLITUS (HCC): ICD-10-CM

## 2019-12-26 LAB
BASOPHILS # BLD AUTO: 0.08 10*3/MM3 (ref 0–0.2)
BASOPHILS NFR BLD AUTO: 1.5 % (ref 0–1.5)
DEPRECATED RDW RBC AUTO: 86.6 FL (ref 37–54)
EOSINOPHIL # BLD AUTO: 0.29 10*3/MM3 (ref 0–0.4)
EOSINOPHIL NFR BLD AUTO: 5.3 % (ref 0.3–6.2)
ERYTHROCYTE [DISTWIDTH] IN BLOOD BY AUTOMATED COUNT: 23.8 % (ref 12.3–15.4)
HCT VFR BLD AUTO: 29.1 % (ref 34–46.6)
HGB BLD-MCNC: 9.5 G/DL (ref 12–15.9)
IMM GRANULOCYTES # BLD AUTO: 0.04 10*3/MM3 (ref 0–0.05)
IMM GRANULOCYTES NFR BLD AUTO: 0.7 % (ref 0–0.5)
LYMPHOCYTES # BLD AUTO: 1.62 10*3/MM3 (ref 0.7–3.1)
LYMPHOCYTES NFR BLD AUTO: 29.5 % (ref 19.6–45.3)
MCH RBC QN AUTO: 33.9 PG (ref 26.6–33)
MCHC RBC AUTO-ENTMCNC: 32.6 G/DL (ref 31.5–35.7)
MCV RBC AUTO: 103.9 FL (ref 79–97)
MONOCYTES # BLD AUTO: 0.27 10*3/MM3 (ref 0.1–0.9)
MONOCYTES NFR BLD AUTO: 4.9 % (ref 5–12)
NEUTROPHILS # BLD AUTO: 3.2 10*3/MM3 (ref 1.7–7)
NEUTROPHILS NFR BLD AUTO: 58.1 % (ref 42.7–76)
NRBC BLD AUTO-RTO: 0 /100 WBC (ref 0–0.2)
PLATELET # BLD AUTO: 432 10*3/MM3 (ref 140–450)
PMV BLD AUTO: 11.5 FL (ref 6–12)
RBC # BLD AUTO: 2.8 10*6/MM3 (ref 3.77–5.28)
WBC NRBC COR # BLD: 5.5 10*3/MM3 (ref 3.4–10.8)

## 2019-12-26 PROCEDURE — 25010000002 EPOETIN ALFA PER 1000 UNITS: Performed by: NURSE PRACTITIONER

## 2019-12-26 PROCEDURE — 85025 COMPLETE CBC W/AUTO DIFF WBC: CPT | Performed by: INTERNAL MEDICINE

## 2019-12-26 PROCEDURE — 36415 COLL VENOUS BLD VENIPUNCTURE: CPT

## 2019-12-26 PROCEDURE — 96372 THER/PROPH/DIAG INJ SC/IM: CPT | Performed by: NURSE PRACTITIONER

## 2019-12-26 RX ADMIN — ERYTHROPOIETIN 60000 UNITS: 40000 INJECTION, SOLUTION INTRAVENOUS; SUBCUTANEOUS at 10:42

## 2020-01-02 ENCOUNTER — LAB (OUTPATIENT)
Dept: LAB | Facility: HOSPITAL | Age: 80
End: 2020-01-02

## 2020-01-02 ENCOUNTER — INFUSION (OUTPATIENT)
Dept: ONCOLOGY | Facility: HOSPITAL | Age: 80
End: 2020-01-02

## 2020-01-02 VITALS
TEMPERATURE: 97.8 F | SYSTOLIC BLOOD PRESSURE: 136 MMHG | OXYGEN SATURATION: 94 % | HEART RATE: 72 BPM | DIASTOLIC BLOOD PRESSURE: 74 MMHG

## 2020-01-02 DIAGNOSIS — D46.C MYELODYSPLASTIC SYNDROME WITH 5Q DELETION (HCC): ICD-10-CM

## 2020-01-02 DIAGNOSIS — N18.30 CKD STAGE 3 DUE TO TYPE 2 DIABETES MELLITUS (HCC): Primary | ICD-10-CM

## 2020-01-02 DIAGNOSIS — D64.9 ANEMIA REQUIRING TRANSFUSIONS: ICD-10-CM

## 2020-01-02 DIAGNOSIS — E11.22 CKD STAGE 3 DUE TO TYPE 2 DIABETES MELLITUS (HCC): Primary | ICD-10-CM

## 2020-01-02 LAB
ABO GROUP BLD: NORMAL
BASOPHILS # BLD AUTO: 0.07 10*3/MM3 (ref 0–0.2)
BASOPHILS NFR BLD AUTO: 1.2 % (ref 0–1.5)
BLD GP AB SCN SERPL QL: NEGATIVE
DEPRECATED RDW RBC AUTO: 87.3 FL (ref 37–54)
EOSINOPHIL # BLD AUTO: 0.25 10*3/MM3 (ref 0–0.4)
EOSINOPHIL NFR BLD AUTO: 4.2 % (ref 0.3–6.2)
ERYTHROCYTE [DISTWIDTH] IN BLOOD BY AUTOMATED COUNT: 23.4 % (ref 12.3–15.4)
FERRITIN SERPL-MCNC: 776 NG/ML (ref 13–150)
HCT VFR BLD AUTO: 24 % (ref 34–46.6)
HGB BLD-MCNC: 7.8 G/DL (ref 12–15.9)
IMM GRANULOCYTES # BLD AUTO: 0.05 10*3/MM3 (ref 0–0.05)
IMM GRANULOCYTES NFR BLD AUTO: 0.8 % (ref 0–0.5)
IRON 24H UR-MRATE: 153 MCG/DL (ref 37–145)
IRON SATN MFR SERPL: ABNORMAL %
LYMPHOCYTES # BLD AUTO: 1.7 10*3/MM3 (ref 0.7–3.1)
LYMPHOCYTES NFR BLD AUTO: 28.4 % (ref 19.6–45.3)
MCH RBC QN AUTO: 34.5 PG (ref 26.6–33)
MCHC RBC AUTO-ENTMCNC: 32.5 G/DL (ref 31.5–35.7)
MCV RBC AUTO: 106.2 FL (ref 79–97)
MONOCYTES # BLD AUTO: 0.23 10*3/MM3 (ref 0.1–0.9)
MONOCYTES NFR BLD AUTO: 3.8 % (ref 5–12)
NEUTROPHILS # BLD AUTO: 3.69 10*3/MM3 (ref 1.7–7)
NEUTROPHILS NFR BLD AUTO: 61.6 % (ref 42.7–76)
NRBC BLD AUTO-RTO: 0 /100 WBC (ref 0–0.2)
PLATELET # BLD AUTO: 386 10*3/MM3 (ref 140–450)
PMV BLD AUTO: 11.3 FL (ref 6–12)
RBC # BLD AUTO: 2.26 10*6/MM3 (ref 3.77–5.28)
RH BLD: POSITIVE
T&S EXPIRATION DATE: NORMAL
TIBC SERPL-MCNC: ABNORMAL UG/DL
UIBC SERPL-MCNC: <16 MCG/DL (ref 112–346)
WBC NRBC COR # BLD: 5.99 10*3/MM3 (ref 3.4–10.8)

## 2020-01-02 PROCEDURE — 36415 COLL VENOUS BLD VENIPUNCTURE: CPT

## 2020-01-02 PROCEDURE — 86923 COMPATIBILITY TEST ELECTRIC: CPT

## 2020-01-02 PROCEDURE — 86901 BLOOD TYPING SEROLOGIC RH(D): CPT | Performed by: NURSE PRACTITIONER

## 2020-01-02 PROCEDURE — 82728 ASSAY OF FERRITIN: CPT | Performed by: INTERNAL MEDICINE

## 2020-01-02 PROCEDURE — 96372 THER/PROPH/DIAG INJ SC/IM: CPT

## 2020-01-02 PROCEDURE — 83540 ASSAY OF IRON: CPT | Performed by: INTERNAL MEDICINE

## 2020-01-02 PROCEDURE — 86850 RBC ANTIBODY SCREEN: CPT | Performed by: NURSE PRACTITIONER

## 2020-01-02 PROCEDURE — 85025 COMPLETE CBC W/AUTO DIFF WBC: CPT | Performed by: INTERNAL MEDICINE

## 2020-01-02 PROCEDURE — 83550 IRON BINDING TEST: CPT | Performed by: INTERNAL MEDICINE

## 2020-01-02 PROCEDURE — 86900 BLOOD TYPING SEROLOGIC ABO: CPT | Performed by: NURSE PRACTITIONER

## 2020-01-02 PROCEDURE — 25010000002 EPOETIN ALFA PER 1000 UNITS: Performed by: INTERNAL MEDICINE

## 2020-01-02 RX ORDER — ACETAMINOPHEN 325 MG/1
650 TABLET ORAL ONCE
Status: CANCELLED | OUTPATIENT
Start: 2020-01-02 | End: 2020-01-02

## 2020-01-02 RX ORDER — SODIUM CHLORIDE 9 MG/ML
250 INJECTION, SOLUTION INTRAVENOUS AS NEEDED
Status: CANCELLED | OUTPATIENT
Start: 2020-01-02

## 2020-01-02 RX ORDER — DIPHENHYDRAMINE HCL 25 MG
25 CAPSULE ORAL ONCE
Status: CANCELLED | OUTPATIENT
Start: 2020-01-02 | End: 2020-01-02

## 2020-01-02 RX ADMIN — ERYTHROPOIETIN 60000 UNITS: 40000 INJECTION, SOLUTION INTRAVENOUS; SUBCUTANEOUS at 11:21

## 2020-01-02 NOTE — NURSING NOTE
HGB 7.8 today.  Pt reports feeling extreme fatigue for the last few days. Pt typed and screened for 2 units PRBC'S to be given in [place] [facility] tomorrow 1/3/20.

## 2020-01-03 ENCOUNTER — HOSPITAL ENCOUNTER (OUTPATIENT)
Dept: INFUSION THERAPY | Facility: HOSPITAL | Age: 80
Discharge: HOME | End: 2020-01-03
Admitting: NURSE PRACTITIONER

## 2020-01-03 VITALS
BODY MASS INDEX: 38.74 KG/M2 | TEMPERATURE: 98.8 F | RESPIRATION RATE: 18 BRPM | DIASTOLIC BLOOD PRESSURE: 59 MMHG | HEIGHT: 62 IN | HEART RATE: 84 BPM | SYSTOLIC BLOOD PRESSURE: 126 MMHG | OXYGEN SATURATION: 94 % | WEIGHT: 210.5 LBS

## 2020-01-03 DIAGNOSIS — N18.30 CKD STAGE 3 DUE TO TYPE 2 DIABETES MELLITUS (HCC): ICD-10-CM

## 2020-01-03 DIAGNOSIS — D46.C MYELODYSPLASTIC SYNDROME WITH 5Q DELETION (HCC): ICD-10-CM

## 2020-01-03 DIAGNOSIS — E11.22 CKD STAGE 3 DUE TO TYPE 2 DIABETES MELLITUS (HCC): ICD-10-CM

## 2020-01-03 DIAGNOSIS — D64.9 ANEMIA REQUIRING TRANSFUSIONS: ICD-10-CM

## 2020-01-03 PROCEDURE — 36430 TRANSFUSION BLD/BLD COMPNT: CPT

## 2020-01-03 PROCEDURE — A9270 NON-COVERED ITEM OR SERVICE: HCPCS

## 2020-01-03 PROCEDURE — P9016 RBC LEUKOCYTES REDUCED: HCPCS

## 2020-01-03 PROCEDURE — 86900 BLOOD TYPING SEROLOGIC ABO: CPT

## 2020-01-03 PROCEDURE — 63710000001 DIPHENHYDRAMINE PER 50 MG

## 2020-01-03 PROCEDURE — 63710000001 ACETAMINOPHEN 325 MG TABLET

## 2020-01-03 RX ORDER — DIPHENHYDRAMINE HCL 25 MG
CAPSULE ORAL
Status: COMPLETED
Start: 2020-01-03 | End: 2020-01-03

## 2020-01-03 RX ORDER — SODIUM CHLORIDE 9 MG/ML
INJECTION, SOLUTION INTRAVENOUS
Status: COMPLETED
Start: 2020-01-03 | End: 2020-01-03

## 2020-01-03 RX ORDER — SODIUM CHLORIDE 9 MG/ML
250 INJECTION, SOLUTION INTRAVENOUS AS NEEDED
Status: DISCONTINUED | OUTPATIENT
Start: 2020-01-03 | End: 2020-01-05 | Stop reason: HOSPADM

## 2020-01-03 RX ORDER — ACETAMINOPHEN 325 MG/1
TABLET ORAL
Status: COMPLETED
Start: 2020-01-03 | End: 2020-01-03

## 2020-01-03 RX ORDER — DIPHENHYDRAMINE HCL 25 MG
25 CAPSULE ORAL ONCE
Status: COMPLETED | OUTPATIENT
Start: 2020-01-03 | End: 2020-01-03

## 2020-01-03 RX ORDER — ACETAMINOPHEN 325 MG/1
650 TABLET ORAL ONCE
Status: COMPLETED | OUTPATIENT
Start: 2020-01-03 | End: 2020-01-03

## 2020-01-03 RX ADMIN — SODIUM CHLORIDE 250 ML: 9 INJECTION, SOLUTION INTRAVENOUS at 11:42

## 2020-01-03 RX ADMIN — DIPHENHYDRAMINE HYDROCHLORIDE 25 MG: 25 CAPSULE ORAL at 08:44

## 2020-01-03 RX ADMIN — Medication 25 MG: at 08:44

## 2020-01-03 RX ADMIN — ACETAMINOPHEN 650 MG: 325 TABLET, FILM COATED ORAL at 08:44

## 2020-01-03 RX ADMIN — ACETAMINOPHEN 650 MG: 325 TABLET ORAL at 08:44

## 2020-01-03 NOTE — NURSING NOTE
1430 Patient here today for blood transfusion. 2 units PRBC'S given without complication. VSS, patient denies need for AVS. Discharged home via w/c with son.

## 2020-01-03 NOTE — PATIENT INSTRUCTIONS
"  Call [facility] at [phone] if you have any problems or concerns.    We know you have a Choice in healthcare and appreciate you using [facility].  Our purpose is to provide you \"Excellent Care\".  We hope that you will always choose us in the future and continue to recommend us to your family and friends.              Blood Transfusion, Adult, Care After  This sheet gives you information about how to care for yourself after your procedure. Your doctor may also give you more specific instructions. If you have problems or questions, contact your doctor.  Follow these instructions at home:    · Take over-the-counter and prescription medicines only as told by your doctor.  · Go back to your normal activities as told by your doctor.  · Follow instructions from your doctor about how to take care of the area where an IV tube was put into your vein (insertion site). Make sure you:  ? Wash your hands with soap and water before you change your bandage (dressing). If there is no soap and water, use hand .  ? Change your bandage as told by your doctor.  · Check your IV insertion site every day for signs of infection. Check for:  ? More redness, swelling, or pain.  ? More fluid or blood.  ? Warmth.  ? Pus or a bad smell.  Contact a doctor if:  · You have more redness, swelling, or pain around the IV insertion site.  · You have more fluid or blood coming from the IV insertion site.  · Your IV insertion site feels warm to the touch.  · You have pus or a bad smell coming from the IV insertion site.  · Your pee (urine) turns pink, red, or brown.  · You feel weak after doing your normal activities.  Get help right away if:  · You have signs of a serious allergic or body defense (immune) system reaction, including:  ? Itchiness.  ? Hives.  ? Trouble breathing.  ? Anxiety.  ? Pain in your chest or lower back.  ? Fever, flushing, and chills.  ? Fast pulse.  ? Rash.  ? Watery poop (diarrhea).  ? Throwing up " (vomiting).  ? Dark pee.  ? Serious headache.  ? Dizziness.  ? Stiff neck.  ? Yellow color in your face or the white parts of your eyes (jaundice).  Summary  · After a blood transfusion, return to your normal activities as told by your doctor.  · Every day, check for signs of infection where the IV tube was put into your vein.  · Some signs of infection are warm skin, more redness and pain, more fluid or blood, and pus or a bad smell where the needle went in.  · Contact your doctor if you feel weak or have any unusual symptoms.  This information is not intended to replace advice given to you by your health care provider. Make sure you discuss any questions you have with your health care provider.  Document Released: 01/08/2016 Document Revised: 08/11/2017 Document Reviewed: 08/11/2017  [employer] Interactive Patient Education © 2019 [employer]

## 2020-01-04 LAB
ABO + RH BLD: NORMAL
ABO + RH BLD: NORMAL
BH BB BLOOD EXPIRATION DATE: NORMAL
BH BB BLOOD EXPIRATION DATE: NORMAL
BH BB BLOOD TYPE BARCODE: 5100
BH BB BLOOD TYPE BARCODE: 7300
BH BB DISPENSE STATUS: NORMAL
BH BB DISPENSE STATUS: NORMAL
BH BB PRODUCT CODE: NORMAL
BH BB PRODUCT CODE: NORMAL
BH BB UNIT NUMBER: NORMAL
BH BB UNIT NUMBER: NORMAL
UNIT  ABO: NORMAL
UNIT  ABO: NORMAL
UNIT  RH: NORMAL
UNIT  RH: NORMAL

## 2020-01-08 ENCOUNTER — LAB (OUTPATIENT)
Dept: LAB | Facility: HOSPITAL | Age: 80
End: 2020-01-08

## 2020-01-08 ENCOUNTER — INFUSION (OUTPATIENT)
Dept: ONCOLOGY | Facility: HOSPITAL | Age: 80
End: 2020-01-08

## 2020-01-08 DIAGNOSIS — I63.9 CEREBROVASCULAR ACCIDENT (CVA), UNSPECIFIED MECHANISM (HCC): ICD-10-CM

## 2020-01-08 DIAGNOSIS — D46.C MYELODYSPLASTIC SYNDROME WITH 5Q DELETION (HCC): ICD-10-CM

## 2020-01-08 DIAGNOSIS — R26.2 DIFFICULTY WALKING: ICD-10-CM

## 2020-01-08 DIAGNOSIS — M84.469K INSUFFICIENCY FRACTURE OF TIBIA WITH NONUNION, SUBSEQUENT ENCOUNTER: Primary | ICD-10-CM

## 2020-01-08 DIAGNOSIS — D46.C MYELODYSPLASTIC SYNDROME WITH 5Q DELETION (HCC): Primary | ICD-10-CM

## 2020-01-08 LAB
BASOPHILS # BLD AUTO: 0.12 10*3/MM3 (ref 0–0.2)
BASOPHILS NFR BLD AUTO: 1.6 % (ref 0–1.5)
DEPRECATED RDW RBC AUTO: 83.9 FL (ref 37–54)
EOSINOPHIL # BLD AUTO: 0.37 10*3/MM3 (ref 0–0.4)
EOSINOPHIL NFR BLD AUTO: 5 % (ref 0.3–6.2)
ERYTHROCYTE [DISTWIDTH] IN BLOOD BY AUTOMATED COUNT: 24.9 % (ref 12.3–15.4)
HCT VFR BLD AUTO: 32.8 % (ref 34–46.6)
HGB BLD-MCNC: 10.7 G/DL (ref 12–15.9)
IMM GRANULOCYTES # BLD AUTO: 0.12 10*3/MM3 (ref 0–0.05)
IMM GRANULOCYTES NFR BLD AUTO: 1.6 % (ref 0–0.5)
LYMPHOCYTES # BLD AUTO: 2.25 10*3/MM3 (ref 0.7–3.1)
LYMPHOCYTES NFR BLD AUTO: 30.6 % (ref 19.6–45.3)
MCH RBC QN AUTO: 31.8 PG (ref 26.6–33)
MCHC RBC AUTO-ENTMCNC: 32.6 G/DL (ref 31.5–35.7)
MCV RBC AUTO: 97.3 FL (ref 79–97)
MONOCYTES # BLD AUTO: 0.28 10*3/MM3 (ref 0.1–0.9)
MONOCYTES NFR BLD AUTO: 3.8 % (ref 5–12)
NEUTROPHILS # BLD AUTO: 4.22 10*3/MM3 (ref 1.7–7)
NEUTROPHILS NFR BLD AUTO: 57.4 % (ref 42.7–76)
NRBC BLD AUTO-RTO: 0 /100 WBC (ref 0–0.2)
PLATELET # BLD AUTO: 324 10*3/MM3 (ref 140–450)
PMV BLD AUTO: 11.9 FL (ref 6–12)
RBC # BLD AUTO: 3.37 10*6/MM3 (ref 3.77–5.28)
WBC NRBC COR # BLD: 7.36 10*3/MM3 (ref 3.4–10.8)

## 2020-01-08 PROCEDURE — 85025 COMPLETE CBC W/AUTO DIFF WBC: CPT | Performed by: INTERNAL MEDICINE

## 2020-01-08 PROCEDURE — 36415 COLL VENOUS BLD VENIPUNCTURE: CPT

## 2020-01-13 ENCOUNTER — TELEPHONE (OUTPATIENT)
Dept: INTERNAL MEDICINE | Facility: CLINIC | Age: 80
End: 2020-01-13

## 2020-01-13 DIAGNOSIS — N39.0 RECURRENT UTI: Primary | ICD-10-CM

## 2020-01-13 RX ORDER — NITROFURANTOIN 25; 75 MG/1; MG/1
100 CAPSULE ORAL 2 TIMES DAILY
Qty: 14 CAPSULE | Refills: 0 | Status: SHIPPED | OUTPATIENT
Start: 2020-01-13 | End: 2020-01-29

## 2020-01-13 NOTE — TELEPHONE ENCOUNTER
PATIENT HAS A UTI AND WANTS TO KNOW IF DR. REA WILL CALL SOMETHING IN FOR HER TO [facility] IN [place].    SHE IS BURNING WHEN URINATING AND URINATING FREQUENTLY. THIS HAS BEEN GOING ON FOR A WEEK.

## 2020-01-14 ENCOUNTER — TELEPHONE (OUTPATIENT)
Dept: INTERNAL MEDICINE | Facility: CLINIC | Age: 80
End: 2020-01-14

## 2020-01-14 DIAGNOSIS — M25.561 ACUTE PAIN OF RIGHT KNEE: ICD-10-CM

## 2020-01-14 DIAGNOSIS — F41.9 ANXIETY AND DEPRESSION: ICD-10-CM

## 2020-01-14 DIAGNOSIS — F32.A ANXIETY AND DEPRESSION: ICD-10-CM

## 2020-01-14 RX ORDER — NYSTATIN 100000 [USP'U]/G
POWDER TOPICAL
Qty: 60 G | Refills: 2 | Status: SHIPPED | OUTPATIENT
Start: 2020-01-14 | End: 2020-02-19 | Stop reason: SDUPTHER

## 2020-01-14 RX ORDER — DESVENLAFAXINE SUCCINATE 50 MG/1
TABLET, EXTENDED RELEASE ORAL
Qty: 30 TABLET | Refills: 0 | Status: SHIPPED | OUTPATIENT
Start: 2020-01-14 | End: 2020-02-13

## 2020-01-14 NOTE — TELEPHONE ENCOUNTER
Pt is wanting something called in for nausea. States we sent in an antibiotic yesterday but now she is sick to her stomach. She uses [facility] in [place]. Call back is [phone].

## 2020-01-15 ENCOUNTER — INFUSION (OUTPATIENT)
Dept: ONCOLOGY | Facility: HOSPITAL | Age: 80
End: 2020-01-15

## 2020-01-15 ENCOUNTER — LAB (OUTPATIENT)
Dept: LAB | Facility: HOSPITAL | Age: 80
End: 2020-01-15

## 2020-01-15 DIAGNOSIS — D53.9 MACROCYTIC ANEMIA: Primary | ICD-10-CM

## 2020-01-15 LAB
BASOPHILS # BLD AUTO: 0.13 10*3/MM3 (ref 0–0.2)
BASOPHILS NFR BLD AUTO: 2.1 % (ref 0–1.5)
DEPRECATED RDW RBC AUTO: 86.9 FL (ref 37–54)
EOSINOPHIL # BLD AUTO: 0.39 10*3/MM3 (ref 0–0.4)
EOSINOPHIL NFR BLD AUTO: 6.3 % (ref 0.3–6.2)
ERYTHROCYTE [DISTWIDTH] IN BLOOD BY AUTOMATED COUNT: 25.2 % (ref 12.3–15.4)
HCT VFR BLD AUTO: 31.5 % (ref 34–46.6)
HGB BLD-MCNC: 10.3 G/DL (ref 12–15.9)
IMM GRANULOCYTES # BLD AUTO: 0.08 10*3/MM3 (ref 0–0.05)
IMM GRANULOCYTES NFR BLD AUTO: 1.3 % (ref 0–0.5)
LYMPHOCYTES # BLD AUTO: 2.4 10*3/MM3 (ref 0.7–3.1)
LYMPHOCYTES NFR BLD AUTO: 39 % (ref 19.6–45.3)
MCH RBC QN AUTO: 32.1 PG (ref 26.6–33)
MCHC RBC AUTO-ENTMCNC: 32.7 G/DL (ref 31.5–35.7)
MCV RBC AUTO: 98.1 FL (ref 79–97)
MONOCYTES # BLD AUTO: 0.3 10*3/MM3 (ref 0.1–0.9)
MONOCYTES NFR BLD AUTO: 4.9 % (ref 5–12)
NEUTROPHILS # BLD AUTO: 2.85 10*3/MM3 (ref 1.7–7)
NEUTROPHILS NFR BLD AUTO: 46.4 % (ref 42.7–76)
NRBC BLD AUTO-RTO: 0 /100 WBC (ref 0–0.2)
PLATELET # BLD AUTO: 449 10*3/MM3 (ref 140–450)
PMV BLD AUTO: 11.7 FL (ref 6–12)
RBC # BLD AUTO: 3.21 10*6/MM3 (ref 3.77–5.28)
WBC NRBC COR # BLD: 6.15 10*3/MM3 (ref 3.4–10.8)

## 2020-01-15 PROCEDURE — 36415 COLL VENOUS BLD VENIPUNCTURE: CPT

## 2020-01-15 PROCEDURE — 85025 COMPLETE CBC W/AUTO DIFF WBC: CPT | Performed by: INTERNAL MEDICINE

## 2020-01-15 RX ORDER — ONDANSETRON HYDROCHLORIDE 8 MG/1
8 TABLET, FILM COATED ORAL EVERY 8 HOURS PRN
Qty: 15 TABLET | Refills: 0 | Status: ON HOLD | OUTPATIENT
Start: 2020-01-15 | End: 2020-07-29

## 2020-01-15 RX ORDER — GABAPENTIN 400 MG/1
CAPSULE ORAL
Qty: 120 CAPSULE | Refills: 0 | Status: SHIPPED | OUTPATIENT
Start: 2020-01-15 | End: 2020-02-14

## 2020-01-15 RX ORDER — POTASSIUM CHLORIDE 750 MG/1
TABLET, FILM COATED, EXTENDED RELEASE ORAL
Qty: 90 TABLET | Refills: 2 | Status: SHIPPED | OUTPATIENT
Start: 2020-01-15 | End: 2020-07-30 | Stop reason: HOSPADM

## 2020-01-15 NOTE — NURSING NOTE
HGB 10.3 today.  Pt did not require procrit injection today.  Pt to return next week for cbc and possible procrit.

## 2020-01-16 ENCOUNTER — OFFICE VISIT (OUTPATIENT)
Dept: ORTHOPEDIC SURGERY | Facility: CLINIC | Age: 80
End: 2020-01-16

## 2020-01-16 VITALS — HEIGHT: 62 IN | BODY MASS INDEX: 37.54 KG/M2 | WEIGHT: 204 LBS

## 2020-01-16 DIAGNOSIS — M17.11 PRIMARY OSTEOARTHRITIS OF RIGHT KNEE: Primary | ICD-10-CM

## 2020-01-16 PROCEDURE — 99213 OFFICE O/P EST LOW 20 MIN: CPT | Performed by: ORTHOPAEDIC SURGERY

## 2020-01-16 PROCEDURE — 20610 DRAIN/INJ JOINT/BURSA W/O US: CPT | Performed by: ORTHOPAEDIC SURGERY

## 2020-01-16 RX ORDER — LIDOCAINE HYDROCHLORIDE 10 MG/ML
8 INJECTION, SOLUTION EPIDURAL; INFILTRATION; INTRACAUDAL; PERINEURAL
Status: COMPLETED | OUTPATIENT
Start: 2020-01-16 | End: 2020-01-16

## 2020-01-16 RX ORDER — TRIAMCINOLONE ACETONIDE 40 MG/ML
80 INJECTION, SUSPENSION INTRA-ARTICULAR; INTRAMUSCULAR
Status: COMPLETED | OUTPATIENT
Start: 2020-01-16 | End: 2020-01-16

## 2020-01-16 RX ADMIN — TRIAMCINOLONE ACETONIDE 80 MG: 40 INJECTION, SUSPENSION INTRA-ARTICULAR; INTRAMUSCULAR at 11:19

## 2020-01-16 RX ADMIN — LIDOCAINE HYDROCHLORIDE 8 ML: 10 INJECTION, SOLUTION EPIDURAL; INFILTRATION; INTRACAUDAL; PERINEURAL at 11:19

## 2020-01-16 NOTE — PROGRESS NOTES
Subjective:     Patient ID: Joya Singh is a 79 y.o. female.    Chief Complaint: Follow-up right knee pain    History of Present Illness  Joya Singh returns to clinic today for evaluation of her right knee.   The patient had prior gel injections to the right knee on 11/14/19. She notes approximately 30% improvement of the pain with the injection, lasting 6 weeks.  She rates the pain as 9/10, describes it as aching in nature.    Localizes pain to the medial, lateral and anterior aspects of her knee.    Symptoms are exacerbated with weightbearing and sitting in certain positions. She denies any hip or groin pain. She has radiation of her pain down her leg down into her foot occasionally. She also experiences intermittent numbness and tingling in her right foot.      Social History     Occupational History     Employer: RETIRED   Tobacco Use   • Smoking status: Never Smoker   • Smokeless tobacco: Never Used   • Tobacco comment: CAFFEINE USE: NONE   Substance and Sexual Activity   • Alcohol use: No   • Drug use: No   • Sexual activity: Defer     Comment: EXERCISE - RARELY      Past Medical History:   Diagnosis Date   • Allergic rhinitis    • Anemia    • Anxiety    • Appetite absent    • Arthritis    • Asthma    • Back pain    • Bell's palsy    • Black tarry stools    • Blood in stool    • Chronic fatigue    • CKD (chronic kidney disease) stage 3, GFR 30-59 ml/min (CMS/[facility])    • Community acquired pneumonia of left lung 10/25/2018   • Cough    • Depression    • Diabetes mellitus (CMS/[facility])     LAST A1C 6   • Diabetic gastroparesis (CMS/[facility]) 2/19/2016   • Difficulty walking    • Excessive urination at night    • Frequent urination    • GERD (gastroesophageal reflux disease)    • GI bleed    • Gout    • H/O blood clots     LEFT LEG 7 OR 8 YEARS AGO   • Heat intolerance    • History of fall 10/2018   • History of prior pregnancies     x8, miscarriage 5   • History of transfusion 11/2018    due to anemia   • Hyperlipidemia    •  Hypertension    • Hypothyroidism    • Normal coronary arteries     by cath 2013   • Orthostatic hypotension    • AARON (obstructive sleep apnea)     DOESNT WEAR REGULARLY   • PONV (postoperative nausea and vomiting)    • Skin cancer    • Stroke (CMS/HCC)     Several mini-strokes   • TIA (transient ischemic attack)     LAST TIA JULY 2017   • Urination pain    • UTI (urinary tract infection)     Dec 2018 and Jan 2019     Past Surgical History:   Procedure Laterality Date   • BACK SURGERY      HARDWARE   • CHOLECYSTECTOMY      OPEN   • COLONOSCOPY  2011    due for repeat in 2021   • COLONOSCOPY N/A 10/28/2018    Procedure: COLONOSCOPY;  Surgeon: Emmanuel Rogers MD;  Location: [facility] OR;  Service: Gastroenterology   • ENDOSCOPY N/A 10/26/2018    Procedure: ESOPHAGOGASTRODUODENOSCOPY;  Surgeon: Emmanuel Rogers MD;  Location: [facility] OR;  Service: Gastroenterology   • HYSTERECTOMY      PARTIAL    • KNEE SURGERY     • NECK SURGERY     • SPINE SURGERY     • TUMOR REMOVAL Left     Leg   • UPPER GASTROINTESTINAL ENDOSCOPY  2014    gastritis.  done by dr. hastings       Family History   Problem Relation Age of Onset   • Lupus Mother    • Heart failure Mother 59   • Heart disease Other    • Hypertension Other    • Heart attack Father    • Breast cancer Neg Hx          Review of Systems   Constitutional: Negative for chills, diaphoresis, fever and unexpected weight change.   HENT: Negative for hearing loss, nosebleeds, sore throat and tinnitus.    Eyes: Negative for pain and visual disturbance.   Respiratory: Negative for cough, shortness of breath and wheezing.    Cardiovascular: Negative for chest pain and palpitations.   Gastrointestinal: Negative for abdominal pain, diarrhea, nausea and vomiting.   Endocrine: Negative for cold intolerance, heat intolerance and polydipsia.   Genitourinary: Negative for difficulty urinating, dysuria and hematuria.   Musculoskeletal: Positive for arthralgias and myalgias.  "Negative for joint swelling.   Skin: Negative for rash and wound.   Allergic/Immunologic: Negative for environmental allergies.   Neurological: Negative for dizziness, syncope and numbness.   Hematological: Does not bruise/bleed easily.   Psychiatric/Behavioral: Negative for dysphoric mood and sleep disturbance. The patient is not nervous/anxious.            Objective:  Vitals:    01/16/20 1042   Weight: 92.5 kg (204 lb)   Height: 157.5 cm (62\")         01/16/20  1042   Weight: 92.5 kg (204 lb)     Body mass index is 37.31 kg/m².    General: No acute distress.  Resp: normal respiratory effort  Skin: no rashes or wounds; normal turgor  Psych: mood and affect appropriate; recent and remote memory intact      Ortho Exam     Right Knee-    ROM 0-110 degrees  4/5 on flexion  4/5 on extension  Maximal tenderness medial and lateral joint line   Active patellar compression test- positive   Log roll-  negative  Stinchfield-  negative  Positive sensation light tough all distributions symmetric to contralateral side  Brisk cap refill all digits  2+ dorsalis pedis pulse    Imaging:  None  Assessment:        1. Primary osteoarthritis of right knee           Plan:  Large Joint Arthrocentesis: R knee  Date/Time: 1/16/2020 11:19 AM  Consent given by: patient  Site marked: site marked  Timeout: Immediately prior to procedure a time out was called to verify the correct patient, procedure, equipment, support staff and site/side marked as required   Supporting Documentation  Indications: pain   Procedure Details  Location: knee - R knee  Preparation: Patient was prepped and draped in the usual sterile fashion  Needle size: 22 G  Approach: anterolateral  Medications administered: 8 mL lidocaine PF 1% 1 %; 80 mg triamcinolone acetonide 40 MG/ML  Patient tolerance: patient tolerated the procedure well with no immediate complications                1. Discussed treatment options at length with patient at today's visit.   2. Patient would " like to proceed with cortisone injection today to the right knee. Recommended limited use of affected extremity for the next 24 hours to only essential activites other than work on general active and passive motion. Recommended supplementing with ice and soft tissue massage. Discussed with patient that they should see results in 5-7 days, if no improvement in 5-6 weeks I have asked them to call the office to review other options. Patient should call office immediately if they notice redness, warmth, fevers, chills, or residual numbness or tingling for greater than 6 hours after injection.   3. If her pain does not improve, we may consider a medial genicular nerve block with pain management.  Family does not want patient to proceed with right total knee arthoplasty given her medical comorbidities including her uncontrolled diabetes.      Joya Singh was in agreement with plan and had all questions answered.     Orders:  Orders Placed This Encounter   Procedures   • Large Joint Arthrocentesis: R knee       Medications:  No orders of the defined types were placed in this encounter.      Followup:  No follow-ups on file.    Joya was seen today for follow-up.    Diagnoses and all orders for this visit:    Primary osteoarthritis of right knee    Other orders  -     Large Joint Arthrocentesis: R knee        By signing my name here, I Gely Castillo, attest that all documentation on 01/16/20 at 11:59 AM has been prepared under the direction and in the presence of Dr. German Wylie.    I, Dr. German Wylie, personally performed the services described in this documentation, as scribed by Gely Castillo, in my presence, and it is both accurate and complete.    Dictated utilizing Dragon dictation

## 2020-01-21 RX ORDER — APIXABAN 5 MG/1
TABLET, FILM COATED ORAL
Qty: 60 TABLET | Refills: 0 | Status: SHIPPED | OUTPATIENT
Start: 2020-01-21 | End: 2020-04-08

## 2020-01-22 ENCOUNTER — LAB (OUTPATIENT)
Dept: LAB | Facility: HOSPITAL | Age: 80
End: 2020-01-22

## 2020-01-22 ENCOUNTER — INFUSION (OUTPATIENT)
Dept: ONCOLOGY | Facility: HOSPITAL | Age: 80
End: 2020-01-22

## 2020-01-22 DIAGNOSIS — D53.9 MACROCYTIC ANEMIA: Primary | ICD-10-CM

## 2020-01-22 LAB
BASOPHILS # BLD AUTO: 0.12 10*3/MM3 (ref 0–0.2)
BASOPHILS NFR BLD AUTO: 0.8 % (ref 0–1.5)
DEPRECATED RDW RBC AUTO: 82.6 FL (ref 37–54)
EOSINOPHIL # BLD AUTO: 0.57 10*3/MM3 (ref 0–0.4)
EOSINOPHIL NFR BLD AUTO: 4 % (ref 0.3–6.2)
ERYTHROCYTE [DISTWIDTH] IN BLOOD BY AUTOMATED COUNT: 24.8 % (ref 12.3–15.4)
HCT VFR BLD AUTO: 33.2 % (ref 34–46.6)
HGB BLD-MCNC: 11.2 G/DL (ref 12–15.9)
IMM GRANULOCYTES # BLD AUTO: 0.15 10*3/MM3 (ref 0–0.05)
IMM GRANULOCYTES NFR BLD AUTO: 1.1 % (ref 0–0.5)
LYMPHOCYTES # BLD AUTO: 3.36 10*3/MM3 (ref 0.7–3.1)
LYMPHOCYTES NFR BLD AUTO: 23.8 % (ref 19.6–45.3)
MCH RBC QN AUTO: 32.4 PG (ref 26.6–33)
MCHC RBC AUTO-ENTMCNC: 33.7 G/DL (ref 31.5–35.7)
MCV RBC AUTO: 96 FL (ref 79–97)
MONOCYTES # BLD AUTO: 0.63 10*3/MM3 (ref 0.1–0.9)
MONOCYTES NFR BLD AUTO: 4.5 % (ref 5–12)
NEUTROPHILS # BLD AUTO: 9.31 10*3/MM3 (ref 1.7–7)
NEUTROPHILS NFR BLD AUTO: 65.8 % (ref 42.7–76)
NRBC BLD AUTO-RTO: 0 /100 WBC (ref 0–0.2)
PLATELET # BLD AUTO: 588 10*3/MM3 (ref 140–450)
PMV BLD AUTO: 11.7 FL (ref 6–12)
RBC # BLD AUTO: 3.46 10*6/MM3 (ref 3.77–5.28)
WBC NRBC COR # BLD: 14.14 10*3/MM3 (ref 3.4–10.8)

## 2020-01-22 PROCEDURE — 36415 COLL VENOUS BLD VENIPUNCTURE: CPT

## 2020-01-22 PROCEDURE — 85025 COMPLETE CBC W/AUTO DIFF WBC: CPT | Performed by: INTERNAL MEDICINE

## 2020-01-22 NOTE — NURSING NOTE
Pt here today for possible Procrit. Hgb is 11.2 today. Pt instructed to call with any questions or concerns prior to next visit. Pt stated understanding.

## 2020-01-29 ENCOUNTER — OFFICE VISIT (OUTPATIENT)
Dept: ONCOLOGY | Facility: CLINIC | Age: 80
End: 2020-01-29

## 2020-01-29 ENCOUNTER — LAB (OUTPATIENT)
Dept: LAB | Facility: HOSPITAL | Age: 80
End: 2020-01-29

## 2020-01-29 ENCOUNTER — OFFICE VISIT (OUTPATIENT)
Dept: INTERNAL MEDICINE | Facility: CLINIC | Age: 80
End: 2020-01-29

## 2020-01-29 ENCOUNTER — INFUSION (OUTPATIENT)
Dept: ONCOLOGY | Facility: HOSPITAL | Age: 80
End: 2020-01-29

## 2020-01-29 VITALS
SYSTOLIC BLOOD PRESSURE: 116 MMHG | OXYGEN SATURATION: 95 % | TEMPERATURE: 97.8 F | BODY MASS INDEX: 37.3 KG/M2 | DIASTOLIC BLOOD PRESSURE: 72 MMHG | HEART RATE: 91 BPM | HEIGHT: 62 IN | RESPIRATION RATE: 16 BRPM

## 2020-01-29 VITALS
SYSTOLIC BLOOD PRESSURE: 120 MMHG | OXYGEN SATURATION: 94 % | DIASTOLIC BLOOD PRESSURE: 50 MMHG | TEMPERATURE: 98.2 F | RESPIRATION RATE: 18 BRPM | HEIGHT: 62 IN | HEART RATE: 89 BPM | BODY MASS INDEX: 37.3 KG/M2

## 2020-01-29 DIAGNOSIS — I10 ESSENTIAL HYPERTENSION: ICD-10-CM

## 2020-01-29 DIAGNOSIS — I82.499 DEEP VEIN THROMBOSIS (DVT) OF OTHER VEIN OF LOWER EXTREMITY, UNSPECIFIED CHRONICITY, UNSPECIFIED LATERALITY (HCC): ICD-10-CM

## 2020-01-29 DIAGNOSIS — E11.22 CKD STAGE 3 DUE TO TYPE 2 DIABETES MELLITUS (HCC): ICD-10-CM

## 2020-01-29 DIAGNOSIS — E11.40 TYPE 2 DIABETES MELLITUS WITH DIABETIC NEUROPATHY, WITH LONG-TERM CURRENT USE OF INSULIN (HCC): Primary | ICD-10-CM

## 2020-01-29 DIAGNOSIS — N18.30 CKD STAGE 3 DUE TO TYPE 2 DIABETES MELLITUS (HCC): ICD-10-CM

## 2020-01-29 DIAGNOSIS — R30.0 DYSURIA: ICD-10-CM

## 2020-01-29 DIAGNOSIS — Z79.4 TYPE 2 DIABETES MELLITUS WITH DIABETIC NEUROPATHY, WITH LONG-TERM CURRENT USE OF INSULIN (HCC): Primary | ICD-10-CM

## 2020-01-29 DIAGNOSIS — D53.9 MACROCYTIC ANEMIA: Primary | ICD-10-CM

## 2020-01-29 DIAGNOSIS — D64.9 ANEMIA REQUIRING TRANSFUSIONS: Primary | ICD-10-CM

## 2020-01-29 DIAGNOSIS — D47.2 MONOCLONAL GAMMOPATHY OF UNKNOWN SIGNIFICANCE (MGUS): Primary | ICD-10-CM

## 2020-01-29 DIAGNOSIS — D46.C MYELODYSPLASTIC SYNDROME WITH 5Q DELETION (HCC): ICD-10-CM

## 2020-01-29 DIAGNOSIS — I50.32 CHRONIC DIASTOLIC CHF (CONGESTIVE HEART FAILURE) (HCC): ICD-10-CM

## 2020-01-29 DIAGNOSIS — F33.1 MODERATE EPISODE OF RECURRENT MAJOR DEPRESSIVE DISORDER (HCC): ICD-10-CM

## 2020-01-29 DIAGNOSIS — R82.998 LEUKOCYTES IN URINE: ICD-10-CM

## 2020-01-29 DIAGNOSIS — E66.01 MORBIDLY OBESE (HCC): ICD-10-CM

## 2020-01-29 LAB
BASOPHILS # BLD AUTO: 0.12 10*3/MM3 (ref 0–0.2)
BASOPHILS NFR BLD AUTO: 1.3 % (ref 0–1.5)
BILIRUB BLD-MCNC: NEGATIVE MG/DL
CLARITY, POC: ABNORMAL
COLOR UR: YELLOW
DEPRECATED RDW RBC AUTO: 86.9 FL (ref 37–54)
EOSINOPHIL # BLD AUTO: 0.3 10*3/MM3 (ref 0–0.4)
EOSINOPHIL NFR BLD AUTO: 3.3 % (ref 0.3–6.2)
ERYTHROCYTE [DISTWIDTH] IN BLOOD BY AUTOMATED COUNT: 25.1 % (ref 12.3–15.4)
GLUCOSE UR STRIP-MCNC: NEGATIVE MG/DL
HCT VFR BLD AUTO: 27.9 % (ref 34–46.6)
HGB BLD-MCNC: 8.9 G/DL (ref 12–15.9)
IMM GRANULOCYTES # BLD AUTO: 0.13 10*3/MM3 (ref 0–0.05)
IMM GRANULOCYTES NFR BLD AUTO: 1.4 % (ref 0–0.5)
KETONES UR QL: NEGATIVE
LEUKOCYTE EST, POC: ABNORMAL
LYMPHOCYTES # BLD AUTO: 2.63 10*3/MM3 (ref 0.7–3.1)
LYMPHOCYTES NFR BLD AUTO: 28.7 % (ref 19.6–45.3)
MCH RBC QN AUTO: 31.7 PG (ref 26.6–33)
MCHC RBC AUTO-ENTMCNC: 31.9 G/DL (ref 31.5–35.7)
MCV RBC AUTO: 99.3 FL (ref 79–97)
MONOCYTES # BLD AUTO: 0.37 10*3/MM3 (ref 0.1–0.9)
MONOCYTES NFR BLD AUTO: 4 % (ref 5–12)
NEUTROPHILS # BLD AUTO: 5.6 10*3/MM3 (ref 1.7–7)
NEUTROPHILS NFR BLD AUTO: 61.3 % (ref 42.7–76)
NITRITE UR-MCNC: POSITIVE MG/ML
NRBC BLD AUTO-RTO: 0 /100 WBC (ref 0–0.2)
PH UR: 5 [PH] (ref 5–8)
PLATELET # BLD AUTO: 455 10*3/MM3 (ref 140–450)
PMV BLD AUTO: 12.3 FL (ref 6–12)
PROT UR STRIP-MCNC: NEGATIVE MG/DL
RBC # BLD AUTO: 2.81 10*6/MM3 (ref 3.77–5.28)
RBC # UR STRIP: ABNORMAL /UL
SP GR UR: 1.01 (ref 1–1.03)
UROBILINOGEN UR QL: NORMAL
WBC NRBC COR # BLD: 9.15 10*3/MM3 (ref 3.4–10.8)

## 2020-01-29 PROCEDURE — 25010000002 EPOETIN ALFA PER 1000 UNITS: Performed by: INTERNAL MEDICINE

## 2020-01-29 PROCEDURE — 99213 OFFICE O/P EST LOW 20 MIN: CPT | Performed by: INTERNAL MEDICINE

## 2020-01-29 PROCEDURE — 81003 URINALYSIS AUTO W/O SCOPE: CPT | Performed by: INTERNAL MEDICINE

## 2020-01-29 PROCEDURE — 36415 COLL VENOUS BLD VENIPUNCTURE: CPT

## 2020-01-29 PROCEDURE — 99214 OFFICE O/P EST MOD 30 MIN: CPT | Performed by: INTERNAL MEDICINE

## 2020-01-29 PROCEDURE — 85025 COMPLETE CBC W/AUTO DIFF WBC: CPT | Performed by: INTERNAL MEDICINE

## 2020-01-29 PROCEDURE — 96372 THER/PROPH/DIAG INJ SC/IM: CPT

## 2020-01-29 RX ORDER — CEFDINIR 300 MG/1
300 CAPSULE ORAL 2 TIMES DAILY
Qty: 14 CAPSULE | Refills: 0 | Status: SHIPPED | OUTPATIENT
Start: 2020-01-29 | End: 2020-02-12

## 2020-01-29 RX ADMIN — ERYTHROPOIETIN 60000 UNITS: 40000 INJECTION, SOLUTION INTRAVENOUS; SUBCUTANEOUS at 10:12

## 2020-01-29 NOTE — PROGRESS NOTES
Subjective     REASON FOR FOLLOW-UP:   1.  Macrocytic anemia secondary to myelodysplastic syndrome with 5 q. minus and chronic kidney disease  2.  Monoclonal gammopathy of undetermined significance                             REQUESTING PHYSICIAN:  Dr. Baugh      History of Present Illness   Ms. Singh is a nikita 79-year-old woman returning today for follow-up of her myelodysplastic syndrome with anemia.  She is doing reasonably well with chronic stable fatigue no significant increased shortness of breath or lightheadedness.  Her most recent blood transfusion was early January for hemoglobin 7.8.      Hematology History:  Patient presented as 78-year-old woman for evaluation of anemia.  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  She has stage III chronic kidney disease followed by Dr. Garza of nephrology.  Reviewing her records, the patient has had anemia present since at least 2014 but her hemoglobin has worsened over the past 6 months.  The patient was admitted to the hospital in October 2018 with symptomatic anemia, shortness of breath and lightheadedness.  She was found to have hemoglobin of 7.0.  There was concern for GI blood loss although EGD and colonoscopy performed showed no obvious etiology of blood loss.  The patient states that she was transfused 7 units of packed red blood cells during the hospital stay.  I do not see any Hemoccult results.  She was previously on Eliquis which was discontinued.  Since discharge in October, the patient has been receiving weekly Procrit 20,000 units in the [facility] at [place], but despite Procrit she has required transfusion on 2 separate occasions.  She is not seeing any bright red blood per rectum or melena.  She complains of fatigue and lightheadedness.    Recent iron profile on 1/17/19 was inconsistent with iron deficiency with an iron saturation 68% and the ferritin was 352.  The red  blood cells are macrocytic.  White blood cell and platelet counts have been normal.    The patient was seen in hematology on 1/29/19 and additional evaluation performed.  The reticulocyte count was elevated 3.72% but the haptoglobin and LDH were both normal arguing against a hemolytic process.  B12 and folic acid levels were normal.  Serum protein electrophoresis showed no M spike but the immunofixation identified a small IgA monoclonal protein with lambda specificity; IgA level 544.  Sedimentation rate elevated 58.  A free light chain ratio from the serum was normal 1.64.    The patient was referred for a bone marrow exam performed on 3/6/2019 which showed a cellularity of 35%.  There was erythroid hyperplasia with megaloblastoid changes, normal myeloid maturation, increased megakaryocytes with frequent micro megakaryocytes, scattered lymphoid aggregates.  There were morphologically normal plasma cells increased in #2 8% of the cellularity.  There were no increased blasts.  No increase in iron stores.  Karyotype showed a deletion 5 q. and 19 of 20 cells analyzed.        Past Medical History:   Diagnosis Date   • Allergic rhinitis    • Anemia    • Anxiety    • Appetite absent    • Arthritis    • Asthma    • Back pain    • Bell's palsy    • Black tarry stools    • Blood in stool    • Chronic fatigue    • CKD (chronic kidney disease) stage 3, GFR 30-59 ml/min (CMS/HCC)    • Community acquired pneumonia of left lung 10/25/2018   • Cough    • Depression    • Diabetes mellitus (CMS/HCC)     LAST A1C 6   • Diabetic gastroparesis (CMS/HCC) 2/19/2016   • Difficulty walking    • Excessive urination at night    • Frequent urination    • GERD (gastroesophageal reflux disease)    • GI bleed    • Gout    • H/O blood clots     LEFT LEG 7 OR 8 YEARS AGO   • Heat intolerance    • History of fall 10/2018   • History of prior pregnancies     x8, miscarriage 5   • History of transfusion 11/2018    due to anemia   • Hyperlipidemia    •  Hypertension    • Hypothyroidism    • Normal coronary arteries     by cath 2013   • Orthostatic hypotension    • AARON (obstructive sleep apnea)     DOESNT WEAR REGULARLY   • PONV (postoperative nausea and vomiting)    • Skin cancer    • Stroke (CMS/HCC)     Several mini-strokes   • TIA (transient ischemic attack)     LAST TIA JULY 2017   • Urination pain    • UTI (urinary tract infection)     Dec 2018 and Jan 2019        Past Surgical History:   Procedure Laterality Date   • BACK SURGERY      HARDWARE   • CHOLECYSTECTOMY      OPEN   • COLONOSCOPY  2011    due for repeat in 2021   • COLONOSCOPY N/A 10/28/2018    Procedure: COLONOSCOPY;  Surgeon: Emmanuel Rogers MD;  Location: [facility] OR;  Service: Gastroenterology   • ENDOSCOPY N/A 10/26/2018    Procedure: ESOPHAGOGASTRODUODENOSCOPY;  Surgeon: Emmanuel Rogers MD;  Location: [facility] OR;  Service: Gastroenterology   • HYSTERECTOMY      PARTIAL    • KNEE SURGERY     • NECK SURGERY     • SPINE SURGERY     • TUMOR REMOVAL Left     Leg   • UPPER GASTROINTESTINAL ENDOSCOPY  2014    gastritis.  done by dr. hastings        Current Outpatient Medications on File Prior to Visit   Medication Sig Dispense Refill   • ACCU-CHEK FASTCLIX LANCETS misc TEST 3-4 TIMES DAILY AS DIRECTED 400 each 3   • ACCU-CHEK SMARTVIEW test strip TEST BLOOD SUGAR THREE TIMES DAILY OR AS DIRECTED 300 each 3   • acetaminophen (TYLENOL) 325 MG tablet Take 2 tablets by mouth Every 6 (Six) Hours As Needed for Mild Pain . OTC product 40 tablet 0   • albuterol sulfate  (90 Base) MCG/ACT inhaler Inhale 2 puffs Every 4 (Four) Hours As Needed for Wheezing. 1 inhaler 3   • Alcohol Swabs (B-D SINGLE USE SWABS REGULAR) pads      • atorvastatin (LIPITOR) 10 MG tablet TAKE ONE TABLET BY MOUTH AT BEDTIME 90 tablet 1   • benzonatate (TESSALON) 200 MG capsule Take 1 capsule by mouth 3 (Three) Times a Day As Needed for Cough. 20 capsule 2   • Blood Glucose Monitoring Suppl (ACCU-CHEK KENNETH  SMARTVIEW) w/Device kit TEST blood sugar three times daily or as directed 1 kit 0   • cholecalciferol 2000 units tablet Take 2,000 Units by mouth Daily.     • Continuous Blood Gluc  (FREESTYLE MATILDA 14 DAY READER) device 1 each 6 (Six) Times a Day. 1 Device 0   • Continuous Blood Gluc Sensor (FREESTYLE MATILDA 14 DAY SENSOR) misc 1 each 6 (Six) Times a Day. 2 each 11   • cyclobenzaprine (FLEXERIL) 10 MG tablet TAKE ONE TABLET BY MOUTH TWICE DAILY as needed for muscle spasms 30 tablet 0   • desvenlafaxine (PRISTIQ) 50 MG 24 hr tablet TAKE ONE TABLET BY MOUTH EVERY DAY 30 tablet 0   • diphenhydrAMINE (BENADRYL) 25 mg capsule Take 25 mg by mouth Every 6 (Six) Hours As Needed for Itching.     • docusate sodium (COLACE) 100 MG capsule Take 1 capsule by mouth 2 (Two) Times a Day.     • ELIQUIS 5 MG tablet tablet TAKE ONE TABLET BY MOUTH TWICE DAILY 60 tablet 0   • epoetin chu 55583 UNIT/ML solution 20,000 Units, epoetin chu 73890 UNIT/ML solution 40,000 Units Inject 60,000 Units under the skin into the appropriate area as directed 1 (One) Time Per Week.     • fluticasone (FLONASE) 50 MCG/ACT nasal spray 2 sprays into the nostril(s) as directed by provider Daily. 16 g 0   • furosemide (LASIX) 20 MG tablet TAKE ONE (1) TABLET ORALLY (BY MOUTH) ONCE DAILY 90 tablet 1   • gabapentin (NEURONTIN) 400 MG capsule TAKE ONE CAPSULE BY MOUTH EVERY MORNING, AT NOON, AND TWO AT BEDTIME 120 capsule 0   • HYDROcodone-acetaminophen (NORCO) 5-325 MG per tablet Take 1 tablet by mouth Every 6 (Six) Hours As Needed for Moderate Pain . 60 tablet 0   • insulin aspart (novoLOG) 100 UNIT/ML injection Inject 15 Units under the skin into the appropriate area as directed 3 (Three) Times a Day With Meals.  12   • Insulin Degludec (TRESIBA FLEXTOUCH) 200 UNIT/ML solution pen-injector Inject 54 Units under the skin into the appropriate area as directed every night at bedtime. 9 mL 11   • lactobacillus acidophilus (RISAQUAD) capsule capsule  "Take 1 capsule by mouth Daily.     • levothyroxine (SYNTHROID, LEVOTHROID) 88 MCG tablet TAKE ONE TABLET BY MOUTH EVERY DAY 90 tablet 1   • midodrine (PROAMATINE) 2.5 MG tablet Take 1 tablet by mouth 3 (Three) Times a Day. 90 tablet 6   • Multiple Vitamins-Minerals (CENTRUM SILVER PO) Take  by mouth Daily.     • Needle, Disp, (BD DISP NEEDLES) 30G X 1/2\" misc To be used 3 times daily with Novolog Flexpen. 100 each 5   • nystatin (MYCOSTATIN) 878848 UNIT/GM powder APPLY TOPICALLY 3 TIMES A DAY 60 g 2   • O2 (OXYGEN) Inhale 2 L/min Every Night. Uses 2L  overnight only     • omeprazole (priLOSEC) 40 MG capsule TAKE ONE CAPSULE BY MOUTH EVERY DAY 90 capsule 1   • ondansetron (ZOFRAN) 8 MG tablet Take 1 tablet by mouth Every 8 (Eight) Hours As Needed for Nausea or Vomiting. 15 tablet 0   • ORTHOVISC 30 MG/2ML solution prefilled syringe injection      • polyethylene glycol (MIRALAX) pack packet Take 17 g by mouth Daily.     • potassium chloride (K-DUR) 10 MEQ CR tablet TAKE ONE TABLET BY MOUTH EVERY DAY 90 tablet 2   • promethazine (PHENERGAN) 12.5 MG tablet TABLET ONE-HALF TABLET TO ONE TABLET BY MOUTH EVERY 6 HOURS AS NEEDED FOR NAUSEA 20 tablet 0   • ULTICARE MINI PEN NEEDLES 31G X 6 MM misc USE TO INJECT INSULINS 4 TIMES DAILY AS DIRECTED 400 each 3   • gabapentin (NEURONTIN) 400 MG capsule      • melatonin 5 MG tablet tablet Take 1 tablet by mouth At Night As Needed (insomnia).     • nitrofurantoin, macrocrystal-monohydrate, (MACROBID) 100 MG capsule Take 1 capsule by mouth 2 (Two) Times a Day. 14 capsule 0   • [DISCONTINUED] Calcium Carbonate-Vit D-Min (CALCIUM 1200) 9546-2942 MG-UNIT chewable tablet Chew 1 tablet Daily.       Current Facility-Administered Medications on File Prior to Visit   Medication Dose Route Frequency Provider Last Rate Last Dose   • epoetin chu (EPOGEN,PROCRIT) 60,000 Units 2 mL injection  60,000 Units Subcutaneous Weekly Elieser Headley MD   60,000 Units at 01/29/20 1012        ALLERGIES: " "   Allergies   Allergen Reactions   • Baclofen Anxiety     Panic attack, nightmares   • Eliquis [Apixaban] GI Bleeding   • Codeine Itching and Rash   • Lisinopril Cough   • Morphine Hives   • Penicillins Rash     Tolerates cephalosporins         Social History     Socioeconomic History   • Marital status:      Spouse name: Not on file   • Number of children: 3   • Years of education: High School   • Highest education level: Not on file   Occupational History     Employer: RETIRED   Tobacco Use   • Smoking status: Never Smoker   • Smokeless tobacco: Never Used   • Tobacco comment: CAFFEINE USE: NONE   Substance and Sexual Activity   • Alcohol use: No   • Drug use: No   • Sexual activity: Defer     Comment: EXERCISE - RARELY        Family History   Problem Relation Age of Onset   • Lupus Mother    • Heart failure Mother 59   • Heart disease Other    • Hypertension Other    • Heart attack Father    • Breast cancer Neg Hx         Review of Systems   Constitutional: Positive for activity change and fatigue. Negative for appetite change, fever and unexpected weight change.   HENT: Negative for congestion.    Respiratory: Negative for apnea, cough and shortness of breath.    Gastrointestinal: Negative for abdominal pain, blood in stool, nausea and vomiting.   Genitourinary: Negative for frequency and urgency.   Musculoskeletal: Positive for arthralgias, back pain and gait problem. Negative for neck stiffness.   Skin: Negative.    Neurological: Positive for numbness. Negative for dizziness, tremors, speech difficulty and light-headedness.   Hematological: Negative.    Psychiatric/Behavioral: Negative.        Objective     Vitals:    01/29/20 0934   BP: 120/50   Pulse: 89   Resp: 18   Temp: 98.2 °F (36.8 °C)   TempSrc: Oral   SpO2: 94%   Weight: Comment: Unable to stand for weight   Height: 157.5 cm (62.01\")   PainSc: 0-No pain     Current Status 1/29/2020   ECOG score 1       Physical Exam    CON: pleasant " well-developed somewhat chronic ill appearing woman  HEENT: no icterus, no thrush, moist membranes  NECK: no jvd  LYMPH: No cervical or supraclavicular lymphadenopathy  CV: RRR, S1S2, no murmur  RESP: Lungs clear to auscultation bilaterally, no wheezing noted.  MUSC: No edema noted.  Poor mobility, and a wheelchair  NEURO: alert and oriented x3, mild global weakness  PSYCH: normal mood and affect      RECENT LABS:  Hematology WBC   Date Value Ref Range Status   01/29/2020 9.15 3.40 - 10.80 10*3/mm3 Final   04/23/2019 5.14 3.40 - 10.80 10*3/mm3 Final     RBC   Date Value Ref Range Status   01/29/2020 2.81 (L) 3.77 - 5.28 10*6/mm3 Final   04/23/2019 3.02 (L) 3.77 - 5.28 10*6/mm3 Final     Hemoglobin   Date Value Ref Range Status   01/29/2020 8.9 (L) 12.0 - 15.9 g/dL Final     Hematocrit   Date Value Ref Range Status   01/29/2020 27.9 (L) 34.0 - 46.6 % Final     Platelets   Date Value Ref Range Status   01/29/2020 455 (H) 140 - 450 10*3/mm3 Final          Assessment/Plan     1.  Macrocytic anemia:  BM biopsy 3/5/19 c/w myelodysplastic syndrome with deletion of 5 q.  In addition, the patient has chronic kidney disease, stage III.  She is currently doing okay on weekly Procrit with transfusion support required about once per month.  We will continue this plan for now.  Revlimid would be an option down the road if her transfusion requirement increases.  Her hemoglobin today is 8.9 and we will proceed with Procrit.  Patient requires transfusion if the hemoglobin is less than 8.0.    2.  Monoclonal gammopathy of undetermined significance: The patient's bone marrow showed slight increase in plasma cells 8% of the cellularity but normal in morphology.  She has an IgA monoclonality on her immunofixation but no measurable M spike and a normal free light chain ratio.   Repeat studies 5/16/2019 showed a stable IgA 509 with a normal light chain ratio, no M spike.  Studies performed 10/24/2019 show stable IgA at 491, no M spike  with increaseing kappa/lambda rtio and kappa free light chain thought to be related to her CKD as discussed with Dr. Headley.  I will repeat her studies at 2-month follow-up.      3.  Chronic kidney disease, stage III which contributes to anemia

## 2020-01-29 NOTE — PROGRESS NOTES
Joya Singh is a 79 y.o. female, who presents with a chief complaint of   Chief Complaint   Patient presents with   • 1 month follow up   • Diabetes       HPI   Pt says last night she had chest pain for about 10 min and then went away.  No other recent episodes.      Myelodysplastic syndrome - She is tired today bc her HGB is down to 8.9.  Her last blood transfusion was on 1/4/20.  She is on epogen weekly.    She recently finished abx and now feels like she has dysuria again.      DM - she says her glucoses have been terrible.  Last a1c 7.5.  She likes to eat a lot of fruit.  She is on 54 units of tresiba.  She has novolog and says that if she takes the full 15 units her sugars drop too much.  She has not tried taking a smaller dose of novolog.    Hypothyroidism - last labs ok.     Recurrent dvt - on eliquis.      She got a motorized wheelchair and has been doing more with this.  She has been going back to the [facility] too.         The following portions of the patient's history were reviewed and updated as appropriate: allergies, current medications, past family history, past medical history, past social history, past surgical history and problem list.    Allergies: Baclofen; Eliquis [apixaban]; Codeine; Lisinopril; Morphine; and Penicillins    Review of Systems   Constitutional: Negative.    HENT: Negative.    Eyes: Negative.    Respiratory: Negative.    Cardiovascular: Negative.    Gastrointestinal: Negative.    Endocrine: Negative.    Genitourinary: Negative.    Musculoskeletal: Negative.    Skin: Negative.    Allergic/Immunologic: Negative.    Neurological: Negative.    Hematological: Negative.    Psychiatric/Behavioral: Negative.    All other systems reviewed and are negative.            Wt Readings from Last 3 Encounters:   01/16/20 92.5 kg (204 lb)   01/03/20 95.5 kg (210 lb 8 oz)   12/18/19 92.1 kg (203 lb)     Temp Readings from Last 3 Encounters:   01/29/20 97.8 °F (36.6 °C) (Oral)   01/29/20  98.2 °F (36.8 °C) (Oral)   01/03/20 98.8 °F (37.1 °C)     BP Readings from Last 3 Encounters:   01/29/20 116/72   01/29/20 120/50   01/03/20 126/59     Pulse Readings from Last 3 Encounters:   01/29/20 91   01/29/20 89   01/03/20 84     Body mass index is 37.3 kg/m².  @LASTSAO2(3)@    Physical Exam   Constitutional: She is oriented to person, place, and time. She appears well-developed and well-nourished. No distress.   HENT:   Head: Normocephalic and atraumatic.   Right Ear: External ear normal.   Left Ear: External ear normal.   Nose: Nose normal.   Mouth/Throat: Oropharynx is clear and moist.   Eyes: Pupils are equal, round, and reactive to light. Conjunctivae and EOM are normal.   Neck: Normal range of motion. Neck supple.   Cardiovascular: Normal rate, regular rhythm, normal heart sounds and intact distal pulses.   Pulmonary/Chest: Effort normal and breath sounds normal. No respiratory distress. She has no wheezes.   Musculoskeletal: Normal range of motion.   Normal gait   Neurological: She is alert and oriented to person, place, and time.   Skin: Skin is warm and dry.   Psychiatric: She has a normal mood and affect. Her behavior is normal. Judgment and thought content normal.   Nursing note and vitals reviewed.      Results for orders placed or performed in visit on 01/29/20   POC Urinalysis Dipstick, Automated   Result Value Ref Range    Color Yellow Yellow, Straw, Dark Yellow, Mehnaz    Clarity, UA Slightly Cloudy (A) Clear    Specific Gravity  1.010 1.005 - 1.030    pH, Urine 5.0 5.0 - 8.0    Leukocytes Small (1+) (A) Negative    Nitrite, UA Positive (A) Negative    Protein, POC Negative Negative mg/dL    Glucose, UA Negative Negative, 1000 mg/dL (3+) mg/dL    Ketones, UA Negative Negative    Urobilinogen, UA Normal Normal    Bilirubin Negative Negative    Blood, UA Trace (A) Negative     *Note: Due to a large number of results and/or encounters for the requested time period, some results have not been  displayed. A complete set of results can be found in Results Review.           Joya was seen today for 1 month follow up and diabetes.    Diagnoses and all orders for this visit:    Type 2 diabetes mellitus with diabetic neuropathy, with long-term current use of insulin (CMS/[facility])    Morbidly obese (CMS/[facility])    Chronic diastolic CHF (congestive heart failure) (CMS/[facility])    CKD stage 3 due to type 2 diabetes mellitus (CMS/[facility])  -     Urine Culture - Urine, Urine, Clean Catch    Moderate episode of recurrent major depressive disorder (CMS/[facility])    Deep vein thrombosis (DVT) of other vein of lower extremity, unspecified chronicity, unspecified laterality (CMS/[facility])    Myelodysplastic syndrome with 5q deletion (CMS/[facility])    Essential hypertension    Dysuria  -     POC Urinalysis Dipstick, Automated  -     Urine Culture - Urine, Urine, Clean Catch    Leukocytes in urine  -     Urine Culture - Urine, Urine, Clean Catch    Other orders  -     insulin aspart (novoLOG) 100 UNIT/ML injection; Inject 5 Units under the skin into the appropriate area as directed 3 (Three) Times a Day With Meals.  -     cefdinir (OMNICEF) 300 MG capsule; Take 1 capsule by mouth 2 (Two) Times a Day.          Outpatient Medications Prior to Visit   Medication Sig Dispense Refill   • ACCU-CHEK FASTCLIX LANCETS misc TEST 3-4 TIMES DAILY AS DIRECTED 400 each 3   • ACCU-CHEK SMARTVIEW test strip TEST BLOOD SUGAR THREE TIMES DAILY OR AS DIRECTED 300 each 3   • acetaminophen (TYLENOL) 325 MG tablet Take 2 tablets by mouth Every 6 (Six) Hours As Needed for Mild Pain . OTC product 40 tablet 0   • albuterol sulfate  (90 Base) MCG/ACT inhaler Inhale 2 puffs Every 4 (Four) Hours As Needed for Wheezing. 1 inhaler 3   • atorvastatin (LIPITOR) 10 MG tablet TAKE ONE TABLET BY MOUTH AT BEDTIME 90 tablet 1   • benzonatate (TESSALON) 200 MG capsule Take 1 capsule by mouth 3 (Three) Times a Day As Needed for Cough. 20 capsule 2   • Blood Glucose Monitoring Suppl  (ACCU-CHEK KENNETH SMARTVIEW) w/Device kit TEST blood sugar three times daily or as directed 1 kit 0   • cholecalciferol 2000 units tablet Take 2,000 Units by mouth Daily.     • Continuous Blood Gluc  (FREESTYLE MATILDA 14 DAY READER) device 1 each 6 (Six) Times a Day. 1 Device 0   • Continuous Blood Gluc Sensor (FREESTYLE MATILDA 14 DAY SENSOR) misc 1 each 6 (Six) Times a Day. 2 each 11   • cyclobenzaprine (FLEXERIL) 10 MG tablet TAKE ONE TABLET BY MOUTH TWICE DAILY as needed for muscle spasms 30 tablet 0   • desvenlafaxine (PRISTIQ) 50 MG 24 hr tablet TAKE ONE TABLET BY MOUTH EVERY DAY 30 tablet 0   • diphenhydrAMINE (BENADRYL) 25 mg capsule Take 25 mg by mouth Every 6 (Six) Hours As Needed for Itching.     • docusate sodium (COLACE) 100 MG capsule Take 1 capsule by mouth 2 (Two) Times a Day.     • ELIQUIS 5 MG tablet tablet TAKE ONE TABLET BY MOUTH TWICE DAILY 60 tablet 0   • epoetin chu 17118 UNIT/ML solution 20,000 Units, epoetin chu 27659 UNIT/ML solution 40,000 Units Inject 60,000 Units under the skin into the appropriate area as directed 1 (One) Time Per Week.     • fluticasone (FLONASE) 50 MCG/ACT nasal spray 2 sprays into the nostril(s) as directed by provider Daily. 16 g 0   • furosemide (LASIX) 20 MG tablet TAKE ONE (1) TABLET ORALLY (BY MOUTH) ONCE DAILY 90 tablet 1   • gabapentin (NEURONTIN) 400 MG capsule TAKE ONE CAPSULE BY MOUTH EVERY MORNING, AT NOON, AND TWO AT BEDTIME 120 capsule 0   • HYDROcodone-acetaminophen (NORCO) 5-325 MG per tablet Take 1 tablet by mouth Every 6 (Six) Hours As Needed for Moderate Pain . 60 tablet 0   • Insulin Degludec (TRESIBA FLEXTOUCH) 200 UNIT/ML solution pen-injector Inject 54 Units under the skin into the appropriate area as directed every night at bedtime. 9 mL 11   • lactobacillus acidophilus (RISAQUAD) capsule capsule Take 1 capsule by mouth Daily.     • levothyroxine (SYNTHROID, LEVOTHROID) 88 MCG tablet TAKE ONE TABLET BY MOUTH EVERY DAY 90 tablet 1   •  "midodrine (PROAMATINE) 2.5 MG tablet Take 1 tablet by mouth 3 (Three) Times a Day. 90 tablet 6   • Multiple Vitamins-Minerals (CENTRUM SILVER PO) Take  by mouth Daily.     • Needle, Disp, (BD DISP NEEDLES) 30G X 1/2\" misc To be used 3 times daily with Novolog Flexpen. 100 each 5   • nystatin (MYCOSTATIN) 789502 UNIT/GM powder APPLY TOPICALLY 3 TIMES A DAY 60 g 2   • O2 (OXYGEN) Inhale 2 L/min Every Night. Uses 2L  overnight only     • omeprazole (priLOSEC) 40 MG capsule TAKE ONE CAPSULE BY MOUTH EVERY DAY 90 capsule 1   • ondansetron (ZOFRAN) 8 MG tablet Take 1 tablet by mouth Every 8 (Eight) Hours As Needed for Nausea or Vomiting. 15 tablet 0   • ORTHOVISC 30 MG/2ML solution prefilled syringe injection      • polyethylene glycol (MIRALAX) pack packet Take 17 g by mouth Daily.     • potassium chloride (K-DUR) 10 MEQ CR tablet TAKE ONE TABLET BY MOUTH EVERY DAY 90 tablet 2   • promethazine (PHENERGAN) 12.5 MG tablet TABLET ONE-HALF TABLET TO ONE TABLET BY MOUTH EVERY 6 HOURS AS NEEDED FOR NAUSEA 20 tablet 0   • ULTICARE MINI PEN NEEDLES 31G X 6 MM misc USE TO INJECT INSULINS 4 TIMES DAILY AS DIRECTED 400 each 3   • insulin aspart (novoLOG) 100 UNIT/ML injection Inject 15 Units under the skin into the appropriate area as directed 3 (Three) Times a Day With Meals.  12   • epoetin chu (EPOGEN,PROCRIT) 60,000 Units 2 mL injection        No facility-administered medications prior to visit.      New Medications Ordered This Visit   Medications   • insulin aspart (novoLOG) 100 UNIT/ML injection     Sig: Inject 5 Units under the skin into the appropriate area as directed 3 (Three) Times a Day With Meals.     Refill:  12   • cefdinir (OMNICEF) 300 MG capsule     Sig: Take 1 capsule by mouth 2 (Two) Times a Day.     Dispense:  14 capsule     Refill:  0     [unfilled]  Medications Discontinued During This Encounter   Medication Reason   • insulin aspart (novoLOG) 100 UNIT/ML injection          Return in about 1 month " (around 2/29/2020) for Recheck.

## 2020-02-01 LAB
BACTERIA UR CULT: ABNORMAL
BACTERIA UR CULT: ABNORMAL
OTHER ANTIBIOTIC SUSC ISLT: ABNORMAL

## 2020-02-05 ENCOUNTER — LAB (OUTPATIENT)
Dept: LAB | Facility: HOSPITAL | Age: 80
End: 2020-02-05

## 2020-02-05 ENCOUNTER — INFUSION (OUTPATIENT)
Dept: ONCOLOGY | Facility: HOSPITAL | Age: 80
End: 2020-02-05

## 2020-02-05 ENCOUNTER — HOSPITAL ENCOUNTER (OUTPATIENT)
Dept: INFUSION THERAPY | Facility: HOSPITAL | Age: 80
Discharge: HOME | End: 2020-02-05
Admitting: NURSE PRACTITIONER

## 2020-02-05 VITALS
OXYGEN SATURATION: 93 % | TEMPERATURE: 98.1 F | DIASTOLIC BLOOD PRESSURE: 67 MMHG | RESPIRATION RATE: 16 BRPM | SYSTOLIC BLOOD PRESSURE: 142 MMHG | HEART RATE: 91 BPM

## 2020-02-05 VITALS
TEMPERATURE: 98 F | OXYGEN SATURATION: 94 % | HEART RATE: 78 BPM | SYSTOLIC BLOOD PRESSURE: 106 MMHG | DIASTOLIC BLOOD PRESSURE: 62 MMHG

## 2020-02-05 DIAGNOSIS — D46.C MYELODYSPLASTIC SYNDROME WITH 5Q DELETION (HCC): ICD-10-CM

## 2020-02-05 DIAGNOSIS — D64.9 ANEMIA REQUIRING TRANSFUSIONS: Primary | ICD-10-CM

## 2020-02-05 DIAGNOSIS — D64.9 ANEMIA REQUIRING TRANSFUSIONS: ICD-10-CM

## 2020-02-05 LAB
ABO GROUP BLD: NORMAL
BASOPHILS # BLD AUTO: 0.1 10*3/MM3 (ref 0–0.2)
BASOPHILS NFR BLD AUTO: 2.3 % (ref 0–1.5)
BLD GP AB SCN SERPL QL: NEGATIVE
DEPRECATED RDW RBC AUTO: 88.8 FL (ref 37–54)
EOSINOPHIL # BLD AUTO: 0.27 10*3/MM3 (ref 0–0.4)
EOSINOPHIL NFR BLD AUTO: 6.3 % (ref 0.3–6.2)
ERYTHROCYTE [DISTWIDTH] IN BLOOD BY AUTOMATED COUNT: 25.1 % (ref 12.3–15.4)
HCT VFR BLD AUTO: 25.3 % (ref 34–46.6)
HGB BLD-MCNC: 7.8 G/DL (ref 12–15.9)
IMM GRANULOCYTES # BLD AUTO: 0.05 10*3/MM3 (ref 0–0.05)
IMM GRANULOCYTES NFR BLD AUTO: 1.2 % (ref 0–0.5)
LYMPHOCYTES # BLD AUTO: 1.61 10*3/MM3 (ref 0.7–3.1)
LYMPHOCYTES NFR BLD AUTO: 37.4 % (ref 19.6–45.3)
MCH RBC QN AUTO: 31.3 PG (ref 26.6–33)
MCHC RBC AUTO-ENTMCNC: 30.8 G/DL (ref 31.5–35.7)
MCV RBC AUTO: 101.6 FL (ref 79–97)
MONOCYTES # BLD AUTO: 0.19 10*3/MM3 (ref 0.1–0.9)
MONOCYTES NFR BLD AUTO: 4.4 % (ref 5–12)
NEUTROPHILS # BLD AUTO: 2.09 10*3/MM3 (ref 1.7–7)
NEUTROPHILS NFR BLD AUTO: 48.4 % (ref 42.7–76)
PLATELET # BLD AUTO: 287 10*3/MM3 (ref 140–450)
PMV BLD AUTO: 11.1 FL (ref 6–12)
RBC # BLD AUTO: 2.49 10*6/MM3 (ref 3.77–5.28)
RH BLD: POSITIVE
T&S EXPIRATION DATE: NORMAL
WBC NRBC COR # BLD: 4.31 10*3/MM3 (ref 3.4–10.8)

## 2020-02-05 PROCEDURE — A9270 NON-COVERED ITEM OR SERVICE: HCPCS | Performed by: NURSE PRACTITIONER

## 2020-02-05 PROCEDURE — 86900 BLOOD TYPING SEROLOGIC ABO: CPT | Performed by: NURSE PRACTITIONER

## 2020-02-05 PROCEDURE — 36430 TRANSFUSION BLD/BLD COMPNT: CPT

## 2020-02-05 PROCEDURE — 85025 COMPLETE CBC W/AUTO DIFF WBC: CPT | Performed by: INTERNAL MEDICINE

## 2020-02-05 PROCEDURE — 86923 COMPATIBILITY TEST ELECTRIC: CPT

## 2020-02-05 PROCEDURE — 86850 RBC ANTIBODY SCREEN: CPT | Performed by: NURSE PRACTITIONER

## 2020-02-05 PROCEDURE — P9016 RBC LEUKOCYTES REDUCED: HCPCS

## 2020-02-05 PROCEDURE — 86900 BLOOD TYPING SEROLOGIC ABO: CPT

## 2020-02-05 PROCEDURE — 63710000001 ACETAMINOPHEN 325 MG TABLET: Performed by: NURSE PRACTITIONER

## 2020-02-05 PROCEDURE — 63710000001 DIPHENHYDRAMINE PER 50 MG: Performed by: NURSE PRACTITIONER

## 2020-02-05 PROCEDURE — 36415 COLL VENOUS BLD VENIPUNCTURE: CPT | Performed by: INTERNAL MEDICINE

## 2020-02-05 PROCEDURE — 86901 BLOOD TYPING SEROLOGIC RH(D): CPT | Performed by: NURSE PRACTITIONER

## 2020-02-05 RX ORDER — DIPHENHYDRAMINE HCL 25 MG
25 CAPSULE ORAL ONCE
Status: CANCELLED | OUTPATIENT
Start: 2020-02-05 | End: 2020-02-05

## 2020-02-05 RX ORDER — SODIUM CHLORIDE 9 MG/ML
INJECTION, SOLUTION INTRAVENOUS
Status: COMPLETED
Start: 2020-02-05 | End: 2020-02-05

## 2020-02-05 RX ORDER — SODIUM CHLORIDE 9 MG/ML
250 INJECTION, SOLUTION INTRAVENOUS AS NEEDED
Status: DISCONTINUED | OUTPATIENT
Start: 2020-02-05 | End: 2020-02-07 | Stop reason: HOSPADM

## 2020-02-05 RX ORDER — DIPHENHYDRAMINE HCL 25 MG
25 CAPSULE ORAL ONCE
Status: COMPLETED | OUTPATIENT
Start: 2020-02-05 | End: 2020-02-05

## 2020-02-05 RX ORDER — SODIUM CHLORIDE 9 MG/ML
250 INJECTION, SOLUTION INTRAVENOUS AS NEEDED
Status: CANCELLED | OUTPATIENT
Start: 2020-02-05

## 2020-02-05 RX ORDER — ACETAMINOPHEN 325 MG/1
650 TABLET ORAL ONCE
Status: COMPLETED | OUTPATIENT
Start: 2020-02-05 | End: 2020-02-05

## 2020-02-05 RX ORDER — ACETAMINOPHEN 325 MG/1
650 TABLET ORAL ONCE
Status: CANCELLED | OUTPATIENT
Start: 2020-02-05 | End: 2020-02-05

## 2020-02-05 RX ADMIN — SODIUM CHLORIDE 250 ML: 9 INJECTION, SOLUTION INTRAVENOUS at 17:07

## 2020-02-05 RX ADMIN — ACETAMINOPHEN 650 MG: 325 TABLET, FILM COATED ORAL at 14:53

## 2020-02-05 RX ADMIN — DIPHENHYDRAMINE HYDROCHLORIDE 25 MG: 25 CAPSULE ORAL at 14:52

## 2020-02-05 NOTE — NURSING NOTE
HGB 7.8 today.  PT c/o dizziness since Friday and extreme fatigue.  Order placed for 2 units PRBC's and blood bank armband placed on patient.  PT to receive transfusion today in [place] [facility].

## 2020-02-06 NOTE — NURSING NOTE
1215 Patient arrived early for appointment for 2 units PRBC's today. Appointment not until 1300. Patient registered & to room 863 to lie down. Patient made comfortable in the bed. VSS. 1330 Much trouble getting IV started. Once IV started, 2 units PRBC's given without s/s of reaction. Patient tolerated without problems.

## 2020-02-12 ENCOUNTER — LAB (OUTPATIENT)
Dept: LAB | Facility: HOSPITAL | Age: 80
End: 2020-02-12

## 2020-02-12 ENCOUNTER — INFUSION (OUTPATIENT)
Dept: ONCOLOGY | Facility: HOSPITAL | Age: 80
End: 2020-02-12

## 2020-02-12 VITALS — TEMPERATURE: 98.5 F

## 2020-02-12 DIAGNOSIS — N18.30 CKD STAGE 3 DUE TO TYPE 2 DIABETES MELLITUS (HCC): ICD-10-CM

## 2020-02-12 DIAGNOSIS — D53.9 MACROCYTIC ANEMIA: Primary | ICD-10-CM

## 2020-02-12 DIAGNOSIS — D64.9 ANEMIA REQUIRING TRANSFUSIONS: Primary | ICD-10-CM

## 2020-02-12 DIAGNOSIS — E11.22 CKD STAGE 3 DUE TO TYPE 2 DIABETES MELLITUS (HCC): ICD-10-CM

## 2020-02-12 LAB
BASOPHILS # BLD AUTO: 0.07 10*3/MM3 (ref 0–0.2)
BASOPHILS NFR BLD AUTO: 1 % (ref 0–1.5)
DEPRECATED RDW RBC AUTO: 72 FL (ref 37–54)
EOSINOPHIL # BLD AUTO: 0.28 10*3/MM3 (ref 0–0.4)
EOSINOPHIL NFR BLD AUTO: 3.9 % (ref 0.3–6.2)
ERYTHROCYTE [DISTWIDTH] IN BLOOD BY AUTOMATED COUNT: 21.3 % (ref 12.3–15.4)
HCT VFR BLD AUTO: 28.7 % (ref 34–46.6)
HGB BLD-MCNC: 9.5 G/DL (ref 12–15.9)
IMM GRANULOCYTES # BLD AUTO: 0.08 10*3/MM3 (ref 0–0.05)
IMM GRANULOCYTES NFR BLD AUTO: 1.1 % (ref 0–0.5)
LYMPHOCYTES # BLD AUTO: 2.46 10*3/MM3 (ref 0.7–3.1)
LYMPHOCYTES NFR BLD AUTO: 34.4 % (ref 19.6–45.3)
MCH RBC QN AUTO: 31 PG (ref 26.6–33)
MCHC RBC AUTO-ENTMCNC: 33.1 G/DL (ref 31.5–35.7)
MCV RBC AUTO: 93.8 FL (ref 79–97)
MONOCYTES # BLD AUTO: 0.24 10*3/MM3 (ref 0.1–0.9)
MONOCYTES NFR BLD AUTO: 3.4 % (ref 5–12)
NEUTROPHILS # BLD AUTO: 4.03 10*3/MM3 (ref 1.7–7)
NEUTROPHILS NFR BLD AUTO: 56.2 % (ref 42.7–76)
NRBC BLD AUTO-RTO: 0 /100 WBC (ref 0–0.2)
PLATELET # BLD AUTO: 279 10*3/MM3 (ref 140–450)
PMV BLD AUTO: 12.2 FL (ref 6–12)
RBC # BLD AUTO: 3.06 10*6/MM3 (ref 3.77–5.28)
WBC NRBC COR # BLD: 7.16 10*3/MM3 (ref 3.4–10.8)

## 2020-02-12 PROCEDURE — 85025 COMPLETE CBC W/AUTO DIFF WBC: CPT | Performed by: INTERNAL MEDICINE

## 2020-02-12 PROCEDURE — 25010000002 EPOETIN ALFA PER 1000 UNITS: Performed by: INTERNAL MEDICINE

## 2020-02-12 PROCEDURE — 96372 THER/PROPH/DIAG INJ SC/IM: CPT

## 2020-02-12 PROCEDURE — 36415 COLL VENOUS BLD VENIPUNCTURE: CPT

## 2020-02-12 RX ADMIN — ERYTHROPOIETIN 60000 UNITS: 40000 INJECTION, SOLUTION INTRAVENOUS; SUBCUTANEOUS at 10:16

## 2020-02-13 DIAGNOSIS — F41.9 ANXIETY AND DEPRESSION: ICD-10-CM

## 2020-02-13 DIAGNOSIS — M25.561 ACUTE PAIN OF RIGHT KNEE: ICD-10-CM

## 2020-02-13 DIAGNOSIS — F32.A ANXIETY AND DEPRESSION: ICD-10-CM

## 2020-02-13 RX ORDER — DESVENLAFAXINE SUCCINATE 50 MG/1
TABLET, EXTENDED RELEASE ORAL
Qty: 30 TABLET | Refills: 0 | Status: SHIPPED | OUTPATIENT
Start: 2020-02-13 | End: 2020-03-18

## 2020-02-14 RX ORDER — GABAPENTIN 400 MG/1
CAPSULE ORAL
Qty: 120 CAPSULE | Refills: 0 | Status: SHIPPED | OUTPATIENT
Start: 2020-02-14 | End: 2020-03-18

## 2020-02-18 ENCOUNTER — TELEPHONE (OUTPATIENT)
Dept: INTERNAL MEDICINE | Facility: CLINIC | Age: 80
End: 2020-02-18

## 2020-02-18 RX ORDER — MIDODRINE HYDROCHLORIDE 2.5 MG/1
TABLET ORAL
Qty: 90 TABLET | Refills: 0 | Status: SHIPPED | OUTPATIENT
Start: 2020-02-18 | End: 2020-04-08

## 2020-02-19 ENCOUNTER — INFUSION (OUTPATIENT)
Dept: ONCOLOGY | Facility: HOSPITAL | Age: 80
End: 2020-02-19

## 2020-02-19 ENCOUNTER — APPOINTMENT (OUTPATIENT)
Dept: LAB | Facility: HOSPITAL | Age: 80
End: 2020-02-19

## 2020-02-19 ENCOUNTER — OFFICE VISIT (OUTPATIENT)
Dept: INTERNAL MEDICINE | Facility: CLINIC | Age: 80
End: 2020-02-19

## 2020-02-19 ENCOUNTER — LAB (OUTPATIENT)
Dept: LAB | Facility: HOSPITAL | Age: 80
End: 2020-02-19

## 2020-02-19 ENCOUNTER — APPOINTMENT (OUTPATIENT)
Dept: ONCOLOGY | Facility: HOSPITAL | Age: 80
End: 2020-02-19

## 2020-02-19 VITALS
HEART RATE: 100 BPM | DIASTOLIC BLOOD PRESSURE: 72 MMHG | TEMPERATURE: 97.6 F | OXYGEN SATURATION: 89 % | SYSTOLIC BLOOD PRESSURE: 130 MMHG | RESPIRATION RATE: 16 BRPM

## 2020-02-19 VITALS
DIASTOLIC BLOOD PRESSURE: 73 MMHG | OXYGEN SATURATION: 95 % | SYSTOLIC BLOOD PRESSURE: 114 MMHG | TEMPERATURE: 98 F | HEART RATE: 81 BPM

## 2020-02-19 DIAGNOSIS — R39.9 UTI SYMPTOMS: Primary | ICD-10-CM

## 2020-02-19 DIAGNOSIS — L29.9 ITCHING: ICD-10-CM

## 2020-02-19 DIAGNOSIS — D53.9 MACROCYTIC ANEMIA: Primary | ICD-10-CM

## 2020-02-19 DIAGNOSIS — E11.22 CKD STAGE 3 DUE TO TYPE 2 DIABETES MELLITUS (HCC): ICD-10-CM

## 2020-02-19 DIAGNOSIS — N18.30 CKD STAGE 3 DUE TO TYPE 2 DIABETES MELLITUS (HCC): ICD-10-CM

## 2020-02-19 DIAGNOSIS — R17 JAUNDICE: ICD-10-CM

## 2020-02-19 DIAGNOSIS — D64.9 ANEMIA REQUIRING TRANSFUSIONS: Primary | ICD-10-CM

## 2020-02-19 LAB
BASOPHILS # BLD AUTO: 0.17 10*3/MM3 (ref 0–0.2)
BASOPHILS NFR BLD AUTO: 2.8 % (ref 0–1.5)
BILIRUB BLD-MCNC: NEGATIVE MG/DL
CLARITY, POC: ABNORMAL
COLOR UR: YELLOW
DEPRECATED RDW RBC AUTO: 76.9 FL (ref 37–54)
EOSINOPHIL # BLD AUTO: 0.22 10*3/MM3 (ref 0–0.4)
EOSINOPHIL NFR BLD AUTO: 3.6 % (ref 0.3–6.2)
ERYTHROCYTE [DISTWIDTH] IN BLOOD BY AUTOMATED COUNT: 22.1 % (ref 12.3–15.4)
GLUCOSE UR STRIP-MCNC: NEGATIVE MG/DL
HCT VFR BLD AUTO: 27.8 % (ref 34–46.6)
HGB BLD-MCNC: 8.6 G/DL (ref 12–15.9)
IMM GRANULOCYTES # BLD AUTO: 0.1 10*3/MM3 (ref 0–0.05)
IMM GRANULOCYTES NFR BLD AUTO: 1.6 % (ref 0–0.5)
KETONES UR QL: ABNORMAL
LEUKOCYTE EST, POC: ABNORMAL
LYMPHOCYTES # BLD AUTO: 2.04 10*3/MM3 (ref 0.7–3.1)
LYMPHOCYTES NFR BLD AUTO: 33.6 % (ref 19.6–45.3)
MCH RBC QN AUTO: 30.7 PG (ref 26.6–33)
MCHC RBC AUTO-ENTMCNC: 30.9 G/DL (ref 31.5–35.7)
MCV RBC AUTO: 99.3 FL (ref 79–97)
MONOCYTES # BLD AUTO: 0.3 10*3/MM3 (ref 0.1–0.9)
MONOCYTES NFR BLD AUTO: 4.9 % (ref 5–12)
NEUTROPHILS # BLD AUTO: 3.25 10*3/MM3 (ref 1.7–7)
NEUTROPHILS NFR BLD AUTO: 53.5 % (ref 42.7–76)
NITRITE UR-MCNC: POSITIVE MG/ML
PH UR: 5.5 [PH] (ref 5–8)
PLATELET # BLD AUTO: 326 10*3/MM3 (ref 140–450)
PMV BLD AUTO: 11.5 FL (ref 6–12)
PROT UR STRIP-MCNC: ABNORMAL MG/DL
RBC # BLD AUTO: 2.8 10*6/MM3 (ref 3.77–5.28)
RBC # UR STRIP: ABNORMAL /UL
SP GR UR: 1.02 (ref 1–1.03)
UROBILINOGEN UR QL: NORMAL
WBC NRBC COR # BLD: 6.08 10*3/MM3 (ref 3.4–10.8)

## 2020-02-19 PROCEDURE — 96372 THER/PROPH/DIAG INJ SC/IM: CPT

## 2020-02-19 PROCEDURE — 81003 URINALYSIS AUTO W/O SCOPE: CPT | Performed by: INTERNAL MEDICINE

## 2020-02-19 PROCEDURE — 25010000002 EPOETIN ALFA PER 1000 UNITS: Performed by: INTERNAL MEDICINE

## 2020-02-19 PROCEDURE — 36415 COLL VENOUS BLD VENIPUNCTURE: CPT

## 2020-02-19 PROCEDURE — 99214 OFFICE O/P EST MOD 30 MIN: CPT | Performed by: INTERNAL MEDICINE

## 2020-02-19 PROCEDURE — 85025 COMPLETE CBC W/AUTO DIFF WBC: CPT | Performed by: INTERNAL MEDICINE

## 2020-02-19 RX ORDER — CEPHALEXIN 500 MG/1
500 CAPSULE ORAL 2 TIMES DAILY
Qty: 14 CAPSULE | Refills: 0 | Status: SHIPPED | OUTPATIENT
Start: 2020-02-19 | End: 2020-02-26

## 2020-02-19 RX ORDER — NYSTATIN 100000 [USP'U]/G
POWDER TOPICAL 2 TIMES DAILY
Qty: 60 G | Refills: 2 | Status: SHIPPED | OUTPATIENT
Start: 2020-02-19 | End: 2020-12-14

## 2020-02-19 RX ADMIN — ERYTHROPOIETIN 60000 UNITS: 40000 INJECTION, SOLUTION INTRAVENOUS; SUBCUTANEOUS at 09:51

## 2020-02-19 NOTE — PROGRESS NOTES
Joya Singh is a 79 y.o. female, who presents with a chief complaint of   Chief Complaint   Patient presents with   • Urinary Tract Infection     since last thursday       HPI   Pt here bc of dysuria and frequency    She is also having more itching of her skin.   She has aplastic anemia and hgb today 8.6.      The following portions of the patient's history were reviewed and updated as appropriate: allergies, current medications, past family history, past medical history, past social history, past surgical history and problem list.    Allergies: Baclofen; Eliquis [apixaban]; Codeine; Lisinopril; Morphine; and Penicillins    Review of Systems   Constitutional: Negative.    HENT: Negative.    Eyes: Negative.    Respiratory: Negative.    Cardiovascular: Negative.    Gastrointestinal: Negative.    Endocrine: Negative.    Genitourinary: Positive for dysuria and frequency.   Musculoskeletal: Negative.    Skin:        itching   Allergic/Immunologic: Negative.    Neurological: Negative.    Hematological: Negative.    Psychiatric/Behavioral: Negative.    All other systems reviewed and are negative.            Wt Readings from Last 3 Encounters:   01/16/20 92.5 kg (204 lb)   01/03/20 95.5 kg (210 lb 8 oz)   12/18/19 92.1 kg (203 lb)     Temp Readings from Last 3 Encounters:   02/19/20 97.6 °F (36.4 °C) (Oral)   02/19/20 98 °F (36.7 °C)   02/12/20 98.5 °F (36.9 °C)     BP Readings from Last 3 Encounters:   02/19/20 130/72   02/19/20 114/73   02/05/20 142/67     Pulse Readings from Last 3 Encounters:   02/19/20 100   02/19/20 81   02/05/20 91     There is no height or weight on file to calculate BMI.  @LASTSAO2(3)@    Physical Exam   Constitutional: She is oriented to person, place, and time. She appears well-developed and well-nourished. No distress.   HENT:   Head: Normocephalic and atraumatic.   Right Ear: External ear normal.   Left Ear: External ear normal.   Nose: Nose normal.   Mouth/Throat: Oropharynx is clear and  moist.   Eyes: Pupils are equal, round, and reactive to light. Conjunctivae and EOM are normal.   Neck: Normal range of motion. Neck supple.   Cardiovascular: Normal rate, regular rhythm, normal heart sounds and intact distal pulses.   Pulmonary/Chest: Effort normal and breath sounds normal. No respiratory distress. She has no wheezes.   Musculoskeletal: Normal range of motion.   In wheelchair   Neurological: She is alert and oriented to person, place, and time.   Skin: Skin is warm and dry.   Psychiatric: She has a normal mood and affect. Her behavior is normal. Judgment and thought content normal.   Nursing note and vitals reviewed.      Results for orders placed or performed in visit on 02/19/20   POCT urinalysis dipstick, automated   Result Value Ref Range    Color Yellow Yellow, Straw, Dark Yellow, Mehnaz    Clarity, UA Turbid (A) Clear    Specific Gravity  1.020 1.005 - 1.030    pH, Urine 5.5 5.0 - 8.0    Leukocytes Large (3+) (A) Negative    Nitrite, UA Positive (A) Negative    Protein,  mg/dL (A) Negative mg/dL    Glucose, UA Negative Negative, 1000 mg/dL (3+) mg/dL    Ketones, UA Trace (A) Negative    Urobilinogen, UA Normal Normal    Bilirubin Negative Negative    Blood, UA Trace (A) Negative     *Note: Due to a large number of results and/or encounters for the requested time period, some results have not been displayed. A complete set of results can be found in Results Review.           Joya was seen today for urinary tract infection.    Diagnoses and all orders for this visit:    UTI symptoms  -     POCT urinalysis dipstick, automated  -     Urine Culture - Urine, Urine, Clean Catch  -     cephalexin (KEFLEX) 500 MG capsule; Take 1 capsule by mouth 2 (Two) Times a Day for 7 days.    Itching  -     Cancel: Comprehensive Metabolic Panel; Future  -     Comprehensive Metabolic Panel    Jaundice  -     Cancel: Comprehensive Metabolic Panel; Future  -     Comprehensive Metabolic Panel    Other orders  -      nystatin (MYCOSTATIN) 007451 UNIT/GM powder; Apply  topically to the appropriate area as directed 2 (Two) Times a Day.          Outpatient Medications Prior to Visit   Medication Sig Dispense Refill   • ACCU-CHEK FASTCLIX LANCETS misc TEST 3-4 TIMES DAILY AS DIRECTED 400 each 3   • ACCU-CHEK SMARTVIEW test strip TEST BLOOD SUGAR THREE TIMES DAILY OR AS DIRECTED 300 each 3   • acetaminophen (TYLENOL) 325 MG tablet Take 2 tablets by mouth Every 6 (Six) Hours As Needed for Mild Pain . OTC product 40 tablet 0   • albuterol sulfate  (90 Base) MCG/ACT inhaler Inhale 2 puffs Every 4 (Four) Hours As Needed for Wheezing. 1 inhaler 3   • atorvastatin (LIPITOR) 10 MG tablet TAKE ONE TABLET BY MOUTH AT BEDTIME 90 tablet 1   • benzonatate (TESSALON) 200 MG capsule Take 1 capsule by mouth 3 (Three) Times a Day As Needed for Cough. 20 capsule 2   • Blood Glucose Monitoring Suppl (ACCU-CHEK KENNETH SMARTVIEW) w/Device kit TEST blood sugar three times daily or as directed 1 kit 0   • cholecalciferol 2000 units tablet Take 2,000 Units by mouth Daily.     • Continuous Blood Gluc  (FREESTYLE MATILDA 14 DAY READER) device 1 each 6 (Six) Times a Day. 1 Device 0   • Continuous Blood Gluc Sensor (FREESTYLE MATILDA 14 DAY SENSOR) misc 1 each 6 (Six) Times a Day. 2 each 11   • cyclobenzaprine (FLEXERIL) 10 MG tablet TAKE ONE TABLET BY MOUTH TWICE DAILY as needed for muscle spasms 30 tablet 0   • desvenlafaxine (PRISTIQ) 50 MG 24 hr tablet TAKE ONE TABLET BY MOUTH EVERY DAY 30 tablet 0   • diphenhydrAMINE (BENADRYL) 25 mg capsule Take 25 mg by mouth Every 6 (Six) Hours As Needed for Itching.     • docusate sodium (COLACE) 100 MG capsule Take 1 capsule by mouth 2 (Two) Times a Day.     • ELIQUIS 5 MG tablet tablet TAKE ONE TABLET BY MOUTH TWICE DAILY 60 tablet 0   • epoetin chu 74759 UNIT/ML solution 20,000 Units, epoetin chu 82922 UNIT/ML solution 40,000 Units Inject 60,000 Units under the skin into the appropriate area as  "directed 1 (One) Time Per Week.     • fluticasone (FLONASE) 50 MCG/ACT nasal spray 2 sprays into the nostril(s) as directed by provider Daily. 16 g 0   • furosemide (LASIX) 20 MG tablet TAKE ONE (1) TABLET ORALLY (BY MOUTH) ONCE DAILY 90 tablet 1   • gabapentin (NEURONTIN) 400 MG capsule TAKE ONE CAPSULE BY MOUTH EVERY MORNING, AT NOON, AND TWO AT BEDTIME 120 capsule 0   • HYDROcodone-acetaminophen (NORCO) 5-325 MG per tablet Take 1 tablet by mouth Every 6 (Six) Hours As Needed for Moderate Pain . 60 tablet 0   • insulin aspart (novoLOG) 100 UNIT/ML injection Inject 5 Units under the skin into the appropriate area as directed 3 (Three) Times a Day With Meals.  12   • Insulin Degludec (TRESIBA FLEXTOUCH) 200 UNIT/ML solution pen-injector Inject 54 Units under the skin into the appropriate area as directed every night at bedtime. 9 mL 11   • lactobacillus acidophilus (RISAQUAD) capsule capsule Take 1 capsule by mouth Daily.     • levothyroxine (SYNTHROID, LEVOTHROID) 88 MCG tablet TAKE ONE TABLET BY MOUTH EVERY DAY 90 tablet 1   • midodrine (PROAMATINE) 2.5 MG tablet TAKE ONE TABLET BY MOUTH THREE TIMES DAILY 90 tablet 0   • Multiple Vitamins-Minerals (CENTRUM SILVER PO) Take  by mouth Daily.     • Needle, Disp, (BD DISP NEEDLES) 30G X 1/2\" misc To be used 3 times daily with Novolog Flexpen. 100 each 5   • O2 (OXYGEN) Inhale 2 L/min Every Night. Uses 2L  overnight only     • omeprazole (priLOSEC) 40 MG capsule TAKE ONE CAPSULE BY MOUTH EVERY DAY 90 capsule 1   • ondansetron (ZOFRAN) 8 MG tablet Take 1 tablet by mouth Every 8 (Eight) Hours As Needed for Nausea or Vomiting. 15 tablet 0   • ORTHOVISC 30 MG/2ML solution prefilled syringe injection      • polyethylene glycol (MIRALAX) pack packet Take 17 g by mouth Daily.     • potassium chloride (K-DUR) 10 MEQ CR tablet TAKE ONE TABLET BY MOUTH EVERY DAY 90 tablet 2   • promethazine (PHENERGAN) 12.5 MG tablet TABLET ONE-HALF TABLET TO ONE TABLET BY MOUTH EVERY 6 HOURS AS " NEEDED FOR NAUSEA 20 tablet 0   • ULTICARE MINI PEN NEEDLES 31G X 6 MM misc USE TO INJECT INSULINS 4 TIMES DAILY AS DIRECTED 400 each 3   • nystatin (MYCOSTATIN) 036653 UNIT/GM powder APPLY TOPICALLY 3 TIMES A DAY 60 g 2     Facility-Administered Medications Prior to Visit   Medication Dose Route Frequency Provider Last Rate Last Dose   • epoetin chu (EPOGEN,PROCRIT) 60,000 Units 2 mL injection  60,000 Units Subcutaneous Weekly Elieser Headley MD   60,000 Units at 02/19/20 0951     New Medications Ordered This Visit   Medications   • cephalexin (KEFLEX) 500 MG capsule     Sig: Take 1 capsule by mouth 2 (Two) Times a Day for 7 days.     Dispense:  14 capsule     Refill:  0   • nystatin (MYCOSTATIN) 272156 UNIT/GM powder     Sig: Apply  topically to the appropriate area as directed 2 (Two) Times a Day.     Dispense:  60 g     Refill:  2     This prescription was filled on 1/14/2020. Any refills authorized will be placed on file.     [unfilled]  Medications Discontinued During This Encounter   Medication Reason   • nystatin (MYCOSTATIN) 020535 UNIT/GM powder Reorder         Return for Next scheduled follow up.

## 2020-02-20 LAB
ALBUMIN SERPL-MCNC: 3.6 G/DL (ref 3.5–5.2)
ALBUMIN/GLOB SERPL: 1.3 G/DL
ALP SERPL-CCNC: 131 U/L (ref 39–117)
ALT SERPL-CCNC: 6 U/L (ref 1–33)
AST SERPL-CCNC: 6 U/L (ref 1–32)
BILIRUB SERPL-MCNC: 0.5 MG/DL (ref 0.2–1.2)
BUN SERPL-MCNC: 22 MG/DL (ref 8–23)
BUN/CREAT SERPL: 13.3 (ref 7–25)
CALCIUM SERPL-MCNC: 8.6 MG/DL (ref 8.6–10.5)
CHLORIDE SERPL-SCNC: 100 MMOL/L (ref 98–107)
CO2 SERPL-SCNC: 26.8 MMOL/L (ref 22–29)
CREAT SERPL-MCNC: 1.66 MG/DL (ref 0.57–1)
GLOBULIN SER CALC-MCNC: 2.7 GM/DL
GLUCOSE SERPL-MCNC: 288 MG/DL (ref 65–99)
POTASSIUM SERPL-SCNC: 5 MMOL/L (ref 3.5–5.2)
PROT SERPL-MCNC: 6.3 G/DL (ref 6–8.5)
SODIUM SERPL-SCNC: 139 MMOL/L (ref 136–145)

## 2020-02-22 LAB
BACTERIA UR CULT: ABNORMAL
BACTERIA UR CULT: ABNORMAL
OTHER ANTIBIOTIC SUSC ISLT: ABNORMAL

## 2020-02-24 ENCOUNTER — TELEPHONE (OUTPATIENT)
Dept: INTERNAL MEDICINE | Facility: CLINIC | Age: 80
End: 2020-02-24

## 2020-02-25 ENCOUNTER — TELEPHONE (OUTPATIENT)
Dept: INTERNAL MEDICINE | Facility: CLINIC | Age: 80
End: 2020-02-25

## 2020-02-26 ENCOUNTER — INFUSION (OUTPATIENT)
Dept: ONCOLOGY | Facility: HOSPITAL | Age: 80
End: 2020-02-26

## 2020-02-26 ENCOUNTER — TELEPHONE (OUTPATIENT)
Dept: INTERNAL MEDICINE | Facility: CLINIC | Age: 80
End: 2020-02-26

## 2020-02-26 ENCOUNTER — LAB (OUTPATIENT)
Dept: LAB | Facility: HOSPITAL | Age: 80
End: 2020-02-26

## 2020-02-26 ENCOUNTER — APPOINTMENT (OUTPATIENT)
Dept: LAB | Facility: HOSPITAL | Age: 80
End: 2020-02-26

## 2020-02-26 ENCOUNTER — APPOINTMENT (OUTPATIENT)
Dept: ONCOLOGY | Facility: HOSPITAL | Age: 80
End: 2020-02-26

## 2020-02-26 VITALS
SYSTOLIC BLOOD PRESSURE: 122 MMHG | OXYGEN SATURATION: 95 % | HEART RATE: 100 BPM | RESPIRATION RATE: 22 BRPM | TEMPERATURE: 97.6 F | DIASTOLIC BLOOD PRESSURE: 62 MMHG

## 2020-02-26 DIAGNOSIS — D53.9 MACROCYTIC ANEMIA: Primary | ICD-10-CM

## 2020-02-26 DIAGNOSIS — E11.22 CKD STAGE 3 DUE TO TYPE 2 DIABETES MELLITUS (HCC): ICD-10-CM

## 2020-02-26 DIAGNOSIS — N18.30 CKD STAGE 3 DUE TO TYPE 2 DIABETES MELLITUS (HCC): ICD-10-CM

## 2020-02-26 DIAGNOSIS — D64.9 ANEMIA REQUIRING TRANSFUSIONS: ICD-10-CM

## 2020-02-26 DIAGNOSIS — D64.9 ANEMIA REQUIRING TRANSFUSIONS: Primary | ICD-10-CM

## 2020-02-26 LAB
ABO GROUP BLD: NORMAL
BASOPHILS # BLD AUTO: 0.09 10*3/MM3 (ref 0–0.2)
BASOPHILS NFR BLD AUTO: 1.3 % (ref 0–1.5)
BLD GP AB SCN SERPL QL: NEGATIVE
DEPRECATED RDW RBC AUTO: 78.3 FL (ref 37–54)
EOSINOPHIL # BLD AUTO: 0.35 10*3/MM3 (ref 0–0.4)
EOSINOPHIL NFR BLD AUTO: 5.2 % (ref 0.3–6.2)
ERYTHROCYTE [DISTWIDTH] IN BLOOD BY AUTOMATED COUNT: 22.8 % (ref 12.3–15.4)
HCT VFR BLD AUTO: 24.5 % (ref 34–46.6)
HGB BLD-MCNC: 8.1 G/DL (ref 12–15.9)
IMM GRANULOCYTES # BLD AUTO: 0.06 10*3/MM3 (ref 0–0.05)
IMM GRANULOCYTES NFR BLD AUTO: 0.9 % (ref 0–0.5)
LYMPHOCYTES # BLD AUTO: 2.38 10*3/MM3 (ref 0.7–3.1)
LYMPHOCYTES NFR BLD AUTO: 35.3 % (ref 19.6–45.3)
MCH RBC QN AUTO: 31.9 PG (ref 26.6–33)
MCHC RBC AUTO-ENTMCNC: 33.1 G/DL (ref 31.5–35.7)
MCV RBC AUTO: 96.5 FL (ref 79–97)
MONOCYTES # BLD AUTO: 0.33 10*3/MM3 (ref 0.1–0.9)
MONOCYTES NFR BLD AUTO: 4.9 % (ref 5–12)
NEUTROPHILS # BLD AUTO: 3.54 10*3/MM3 (ref 1.7–7)
NEUTROPHILS NFR BLD AUTO: 52.4 % (ref 42.7–76)
NRBC BLD AUTO-RTO: 0 /100 WBC (ref 0–0.2)
PLATELET # BLD AUTO: 370 10*3/MM3 (ref 140–450)
PMV BLD AUTO: 11.9 FL (ref 6–12)
RBC # BLD AUTO: 2.54 10*6/MM3 (ref 3.77–5.28)
RH BLD: POSITIVE
T&S EXPIRATION DATE: NORMAL
WBC NRBC COR # BLD: 6.75 10*3/MM3 (ref 3.4–10.8)

## 2020-02-26 PROCEDURE — 86901 BLOOD TYPING SEROLOGIC RH(D): CPT | Performed by: INTERNAL MEDICINE

## 2020-02-26 PROCEDURE — 96372 THER/PROPH/DIAG INJ SC/IM: CPT

## 2020-02-26 PROCEDURE — 86850 RBC ANTIBODY SCREEN: CPT | Performed by: INTERNAL MEDICINE

## 2020-02-26 PROCEDURE — 36415 COLL VENOUS BLD VENIPUNCTURE: CPT

## 2020-02-26 PROCEDURE — 86923 COMPATIBILITY TEST ELECTRIC: CPT

## 2020-02-26 PROCEDURE — 25010000002 EPOETIN ALFA PER 1000 UNITS: Performed by: INTERNAL MEDICINE

## 2020-02-26 PROCEDURE — 86900 BLOOD TYPING SEROLOGIC ABO: CPT | Performed by: INTERNAL MEDICINE

## 2020-02-26 PROCEDURE — 85025 COMPLETE CBC W/AUTO DIFF WBC: CPT | Performed by: INTERNAL MEDICINE

## 2020-02-26 RX ORDER — ACETAMINOPHEN 325 MG/1
650 TABLET ORAL ONCE
Status: CANCELLED | OUTPATIENT
Start: 2020-02-26 | End: 2020-02-26

## 2020-02-26 RX ORDER — SODIUM CHLORIDE 9 MG/ML
250 INJECTION, SOLUTION INTRAVENOUS AS NEEDED
Status: CANCELLED | OUTPATIENT
Start: 2020-02-26

## 2020-02-26 RX ORDER — DIPHENHYDRAMINE HCL 25 MG
25 CAPSULE ORAL ONCE
Status: CANCELLED | OUTPATIENT
Start: 2020-02-26 | End: 2020-02-26

## 2020-02-26 RX ADMIN — ERYTHROPOIETIN 60000 UNITS: 40000 INJECTION, SOLUTION INTRAVENOUS; SUBCUTANEOUS at 10:17

## 2020-02-26 NOTE — TELEPHONE ENCOUNTER
----- Message from Chari Mercado MD sent at 2/24/2020  3:31 PM EST -----  Call pt about labs.  Pt treated appropriately with keflex

## 2020-02-26 NOTE — TELEPHONE ENCOUNTER
----- Message from Chari Mercado MD sent at 2/21/2020  4:47 PM EST -----  Call pt about labs.  Labs ok.  Bilirubin levels not elevated.

## 2020-02-26 NOTE — NURSING NOTE
Pt here today for procrit injection. Hgb is 8.1. Pt states that she is having extreme fatigue and dizziness. V/S are stable at this time. Reviewed labs and symptoms with Dr. Headley. Two units PRBC's ordered for transfusion tomorrow and scheduled at 0830. Pt is given written and verbal instructions and states understanding.

## 2020-02-27 ENCOUNTER — HOSPITAL ENCOUNTER (OUTPATIENT)
Dept: INFUSION THERAPY | Facility: HOSPITAL | Age: 80
Discharge: HOME | End: 2020-02-27
Admitting: INTERNAL MEDICINE

## 2020-02-27 VITALS
RESPIRATION RATE: 16 BRPM | DIASTOLIC BLOOD PRESSURE: 73 MMHG | HEART RATE: 88 BPM | SYSTOLIC BLOOD PRESSURE: 152 MMHG | OXYGEN SATURATION: 92 % | BODY MASS INDEX: 38 KG/M2 | HEIGHT: 62 IN | TEMPERATURE: 98.1 F | WEIGHT: 206.5 LBS

## 2020-02-27 DIAGNOSIS — D64.9 ANEMIA REQUIRING TRANSFUSIONS: ICD-10-CM

## 2020-02-27 PROCEDURE — 63710000001 DIPHENHYDRAMINE PER 50 MG: Performed by: INTERNAL MEDICINE

## 2020-02-27 PROCEDURE — A9270 NON-COVERED ITEM OR SERVICE: HCPCS | Performed by: INTERNAL MEDICINE

## 2020-02-27 PROCEDURE — 36430 TRANSFUSION BLD/BLD COMPNT: CPT

## 2020-02-27 PROCEDURE — 86900 BLOOD TYPING SEROLOGIC ABO: CPT

## 2020-02-27 PROCEDURE — 63710000001 ACETAMINOPHEN 325 MG TABLET: Performed by: INTERNAL MEDICINE

## 2020-02-27 PROCEDURE — P9016 RBC LEUKOCYTES REDUCED: HCPCS

## 2020-02-27 RX ORDER — ACETAMINOPHEN 325 MG/1
650 TABLET ORAL ONCE
Status: COMPLETED | OUTPATIENT
Start: 2020-02-27 | End: 2020-02-27

## 2020-02-27 RX ORDER — SODIUM CHLORIDE 9 MG/ML
INJECTION, SOLUTION INTRAVENOUS
Status: COMPLETED
Start: 2020-02-27 | End: 2020-02-27

## 2020-02-27 RX ORDER — DIPHENHYDRAMINE HCL 25 MG
25 CAPSULE ORAL ONCE
Status: COMPLETED | OUTPATIENT
Start: 2020-02-27 | End: 2020-02-27

## 2020-02-27 RX ORDER — SODIUM CHLORIDE 9 MG/ML
250 INJECTION, SOLUTION INTRAVENOUS AS NEEDED
Status: DISCONTINUED | OUTPATIENT
Start: 2020-02-27 | End: 2020-02-29 | Stop reason: HOSPADM

## 2020-02-27 RX ADMIN — DIPHENHYDRAMINE HYDROCHLORIDE 25 MG: 25 CAPSULE ORAL at 09:11

## 2020-02-27 RX ADMIN — SODIUM CHLORIDE 250 ML: 9 INJECTION, SOLUTION INTRAVENOUS at 11:34

## 2020-02-27 RX ADMIN — ACETAMINOPHEN 650 MG: 325 TABLET, FILM COATED ORAL at 09:11

## 2020-02-28 ENCOUNTER — OFFICE VISIT (OUTPATIENT)
Dept: INTERNAL MEDICINE | Facility: CLINIC | Age: 80
End: 2020-02-28

## 2020-02-28 VITALS
DIASTOLIC BLOOD PRESSURE: 70 MMHG | RESPIRATION RATE: 16 BRPM | HEART RATE: 89 BPM | SYSTOLIC BLOOD PRESSURE: 130 MMHG | OXYGEN SATURATION: 94 % | TEMPERATURE: 97.7 F | HEIGHT: 62 IN | BODY MASS INDEX: 37.77 KG/M2

## 2020-02-28 DIAGNOSIS — E11.40 TYPE 2 DIABETES MELLITUS WITH DIABETIC NEUROPATHY, WITH LONG-TERM CURRENT USE OF INSULIN (HCC): ICD-10-CM

## 2020-02-28 DIAGNOSIS — I82.499 DEEP VEIN THROMBOSIS (DVT) OF OTHER VEIN OF LOWER EXTREMITY, UNSPECIFIED CHRONICITY, UNSPECIFIED LATERALITY (HCC): Primary | ICD-10-CM

## 2020-02-28 DIAGNOSIS — Z79.4 TYPE 2 DIABETES MELLITUS WITH DIABETIC NEUROPATHY, WITH LONG-TERM CURRENT USE OF INSULIN (HCC): ICD-10-CM

## 2020-02-28 DIAGNOSIS — I10 ESSENTIAL HYPERTENSION: ICD-10-CM

## 2020-02-28 DIAGNOSIS — D46.C MYELODYSPLASTIC SYNDROME WITH 5Q DELETION (HCC): ICD-10-CM

## 2020-02-28 DIAGNOSIS — H61.23 BILATERAL HEARING LOSS DUE TO CERUMEN IMPACTION: ICD-10-CM

## 2020-02-28 PROCEDURE — 99214 OFFICE O/P EST MOD 30 MIN: CPT | Performed by: INTERNAL MEDICINE

## 2020-02-28 PROCEDURE — 69210 REMOVE IMPACTED EAR WAX UNI: CPT | Performed by: INTERNAL MEDICINE

## 2020-02-28 NOTE — PROGRESS NOTES
Joya Singh is a 79 y.o. female, who presents with a chief complaint of   Chief Complaint   Patient presents with   • Dizziness   • Chronic Kidney Disease   • Itching     generalized   • Hypertension   • Diabetes   • Urinary Tract Infection     still on antibiotic       HPI   Pt here for follow up    Myelodysplastic syndrome - hgb down to 8.1 yesterday.  She got a blood transfusion yesterday and is feeling better.  This is the 2nd transfusion this week she has needed this month.  She is still dizzy but doing some better.  She is still itching all over.  Her bilirubin was normal     UTI - She is on abx for her recent UTI.  She feels better overall but still has urinary frequency.  She is wearing depends.     htn - currently well controlled. No ha.      T2DM -  No recent a1c checked bc pt has has so many recent blood transfusions.  Pt reports that most am fasting glucoses have been in the 120's.  No recent hypoglycemia        The following portions of the patient's history were reviewed and updated as appropriate: allergies, current medications, past family history, past medical history, past social history, past surgical history and problem list.    Allergies: Baclofen; Eliquis [apixaban]; Codeine; Lisinopril; Morphine; and Penicillins    Review of Systems   Constitutional: Negative.    HENT: Positive for hearing loss.    Eyes: Negative.    Respiratory: Negative.    Cardiovascular: Negative.    Gastrointestinal: Negative.    Endocrine: Negative.    Genitourinary: Positive for frequency.   Musculoskeletal: Negative.    Skin: Negative.    Allergic/Immunologic: Negative.    Neurological: Positive for dizziness.   Hematological: Negative.    Psychiatric/Behavioral: Negative.    All other systems reviewed and are negative.            Wt Readings from Last 3 Encounters:   02/27/20 93.7 kg (206 lb 8 oz)   01/16/20 92.5 kg (204 lb)   01/03/20 95.5 kg (210 lb 8 oz)     Temp Readings from Last 3 Encounters:   02/28/20 97.7  °F (36.5 °C) (Oral)   02/27/20 98.1 °F (36.7 °C)   02/26/20 97.6 °F (36.4 °C)     BP Readings from Last 3 Encounters:   02/28/20 130/70   02/27/20 152/73   02/26/20 122/62     Pulse Readings from Last 3 Encounters:   02/28/20 89   02/27/20 88   02/26/20 100     Body mass index is 37.77 kg/m².    @LASTSAO2(3)@    Physical Exam   Constitutional: She is oriented to person, place, and time. She appears well-developed and well-nourished. No distress.   HENT:   Head: Normocephalic and atraumatic.   Right Ear: External ear normal.   Left Ear: External ear normal.   Nose: Nose normal.   Mouth/Throat: Oropharynx is clear and moist.   Bilateral cerumen impaction.  Exam normal after cerumen removal.   Eyes: Pupils are equal, round, and reactive to light. Conjunctivae and EOM are normal.   Neck: Normal range of motion. Neck supple.   Cardiovascular: Normal rate, regular rhythm, normal heart sounds and intact distal pulses.   Pulmonary/Chest: Effort normal and breath sounds normal. No respiratory distress. She has no wheezes.   Musculoskeletal:   In wheelchair   Neurological: She is alert and oriented to person, place, and time.   Skin: Skin is warm and dry.   Psychiatric: She has a normal mood and affect. Her behavior is normal. Judgment and thought content normal.   Nursing note and vitals reviewed.      Results for orders placed or performed during the hospital encounter of 02/27/20   Prepare RBC, 1 Units   Result Value Ref Range    Product Code B3216O82     Unit Number [id number]     UNIT  ABO B     UNIT  RH POS     Dispense Status PT     Blood Type BPOS     Blood Expiration Date 202003102359     Blood Type Barcode 7300    Prepare RBC, 1 Units   Result Value Ref Range    Product Code H4979Q90     Unit Number [id number]     UNIT  ABO B     UNIT  RH NEG     Dispense Status PT     Blood Type BNEG     Blood Expiration Date 202003162359     Blood Type Barcode 1700      *Note: Due to a large number of results and/or  encounters for the requested time period, some results have not been displayed. A complete set of results can be found in Results Review.     Ear Cerumen Removal  Date/Time: 2/28/2020 12:11 PM  Performed by: Chari Mercado MD  Authorized by: Chari Mercado MD   Consent: Verbal consent obtained.  Risks and benefits: risks, benefits and alternatives were discussed  Consent given by: patient  Patient identity confirmed: verbally with patient    Anesthesia:  Local Anesthetic: none  Location details: left ear and right ear  Patient tolerance: Patient tolerated the procedure well with no immediate complications  Procedure type: instrumentation           Joya was seen today for dizziness, chronic kidney disease, itching, hypertension, diabetes and urinary tract infection.    Diagnoses and all orders for this visit:    Deep vein thrombosis (DVT) of other vein of lower extremity, unspecified chronicity, unspecified laterality (CMS/HCC)    Myelodysplastic syndrome with 5q deletion (CMS/HCC)    Type 2 diabetes mellitus with diabetic neuropathy, with long-term current use of insulin (CMS/[facility])    Essential hypertension    Bilateral hearing loss due to cerumen impaction  -     Ear Cerumen Removal      Cont current meds..  Ov for recheck in 1 mo.    Outpatient Medications Prior to Visit   Medication Sig Dispense Refill   • ACCU-CHEK FASTCLIX LANCETS misc TEST 3-4 TIMES DAILY AS DIRECTED 400 each 3   • ACCU-CHEK SMARTVIEW test strip TEST BLOOD SUGAR THREE TIMES DAILY OR AS DIRECTED 300 each 3   • acetaminophen (TYLENOL) 325 MG tablet Take 2 tablets by mouth Every 6 (Six) Hours As Needed for Mild Pain . OTC product 40 tablet 0   • albuterol sulfate  (90 Base) MCG/ACT inhaler Inhale 2 puffs Every 4 (Four) Hours As Needed for Wheezing. 1 inhaler 3   • atorvastatin (LIPITOR) 10 MG tablet TAKE ONE TABLET BY MOUTH AT BEDTIME 90 tablet 1   • benzonatate (TESSALON) 200 MG capsule Take 1 capsule by mouth 3  (Three) Times a Day As Needed for Cough. 20 capsule 2   • Blood Glucose Monitoring Suppl (ACCU-CHEK KENNETH SMARTVIEW) w/Device kit TEST blood sugar three times daily or as directed 1 kit 0   • cholecalciferol 2000 units tablet Take 2,000 Units by mouth Daily.     • Continuous Blood Gluc  (FREESTYLE MATILDA 14 DAY READER) device 1 each 6 (Six) Times a Day. 1 Device 0   • Continuous Blood Gluc Sensor (FREESTYLE MATILDA 14 DAY SENSOR) misc 1 each 6 (Six) Times a Day. 2 each 11   • cyclobenzaprine (FLEXERIL) 10 MG tablet TAKE ONE TABLET BY MOUTH TWICE DAILY as needed for muscle spasms 30 tablet 0   • desvenlafaxine (PRISTIQ) 50 MG 24 hr tablet TAKE ONE TABLET BY MOUTH EVERY DAY 30 tablet 0   • diphenhydrAMINE (BENADRYL) 25 mg capsule Take 25 mg by mouth Every 6 (Six) Hours As Needed for Itching.     • docusate sodium (COLACE) 100 MG capsule Take 1 capsule by mouth 2 (Two) Times a Day.     • ELIQUIS 5 MG tablet tablet TAKE ONE TABLET BY MOUTH TWICE DAILY 60 tablet 0   • epoetin chu 57160 UNIT/ML solution 20,000 Units, epoetin chu 25486 UNIT/ML solution 40,000 Units Inject 60,000 Units under the skin into the appropriate area as directed 1 (One) Time Per Week.     • fluticasone (FLONASE) 50 MCG/ACT nasal spray 2 sprays into the nostril(s) as directed by provider Daily. 16 g 0   • furosemide (LASIX) 20 MG tablet TAKE ONE (1) TABLET ORALLY (BY MOUTH) ONCE DAILY 90 tablet 1   • gabapentin (NEURONTIN) 400 MG capsule TAKE ONE CAPSULE BY MOUTH EVERY MORNING, AT NOON, AND TWO AT BEDTIME 120 capsule 0   • HYDROcodone-acetaminophen (NORCO) 5-325 MG per tablet Take 1 tablet by mouth Every 6 (Six) Hours As Needed for Moderate Pain . 60 tablet 0   • insulin aspart (novoLOG) 100 UNIT/ML injection Inject 5 Units under the skin into the appropriate area as directed 3 (Three) Times a Day With Meals.  12   • Insulin Degludec (TRESIBA FLEXTOUCH) 200 UNIT/ML solution pen-injector Inject 54 Units under the skin into the appropriate area  "as directed every night at bedtime. 9 mL 11   • lactobacillus acidophilus (RISAQUAD) capsule capsule Take 1 capsule by mouth Daily.     • levothyroxine (SYNTHROID, LEVOTHROID) 88 MCG tablet TAKE ONE TABLET BY MOUTH EVERY DAY 90 tablet 1   • midodrine (PROAMATINE) 2.5 MG tablet TAKE ONE TABLET BY MOUTH THREE TIMES DAILY 90 tablet 0   • Multiple Vitamins-Minerals (CENTRUM SILVER PO) Take  by mouth Daily.     • Needle, Disp, (BD DISP NEEDLES) 30G X 1/2\" misc To be used 3 times daily with Novolog Flexpen. 100 each 5   • nystatin (MYCOSTATIN) 293749 UNIT/GM powder Apply  topically to the appropriate area as directed 2 (Two) Times a Day. 60 g 2   • O2 (OXYGEN) Inhale 2 L/min Every Night. Uses 2L  overnight only     • omeprazole (priLOSEC) 40 MG capsule TAKE ONE CAPSULE BY MOUTH EVERY DAY 90 capsule 1   • ondansetron (ZOFRAN) 8 MG tablet Take 1 tablet by mouth Every 8 (Eight) Hours As Needed for Nausea or Vomiting. 15 tablet 0   • ORTHOVISC 30 MG/2ML solution prefilled syringe injection      • polyethylene glycol (MIRALAX) pack packet Take 17 g by mouth Daily.     • potassium chloride (K-DUR) 10 MEQ CR tablet TAKE ONE TABLET BY MOUTH EVERY DAY 90 tablet 2   • promethazine (PHENERGAN) 12.5 MG tablet TABLET ONE-HALF TABLET TO ONE TABLET BY MOUTH EVERY 6 HOURS AS NEEDED FOR NAUSEA 20 tablet 0   • ULTICARE MINI PEN NEEDLES 31G X 6 MM misc USE TO INJECT INSULINS 4 TIMES DAILY AS DIRECTED 400 each 3     Facility-Administered Medications Prior to Visit   Medication Dose Route Frequency Provider Last Rate Last Dose   • sodium chloride 0.9 % infusion 250 mL  250 mL Intravenous PRN Elieser Headley  mL/hr at 02/27/20 1425     • sodium chloride 0.9 % infusion 250 mL  250 mL Intravenous PRN Elieser Headley MD         No orders of the defined types were placed in this encounter.    [unfilled]  There are no discontinued medications.      Return in about 1 month (around 3/28/2020) for Recheck, labs.  "

## 2020-03-04 ENCOUNTER — LAB (OUTPATIENT)
Dept: LAB | Facility: HOSPITAL | Age: 80
End: 2020-03-04

## 2020-03-04 ENCOUNTER — INFUSION (OUTPATIENT)
Dept: ONCOLOGY | Facility: HOSPITAL | Age: 80
End: 2020-03-04

## 2020-03-04 ENCOUNTER — TELEPHONE (OUTPATIENT)
Dept: INTERNAL MEDICINE | Facility: CLINIC | Age: 80
End: 2020-03-04

## 2020-03-04 VITALS
BODY MASS INDEX: 37.36 KG/M2 | OXYGEN SATURATION: 96 % | SYSTOLIC BLOOD PRESSURE: 109 MMHG | HEART RATE: 85 BPM | HEIGHT: 62 IN | WEIGHT: 203 LBS | RESPIRATION RATE: 14 BRPM | DIASTOLIC BLOOD PRESSURE: 55 MMHG | TEMPERATURE: 98.7 F

## 2020-03-04 DIAGNOSIS — D53.9 MACROCYTIC ANEMIA: Primary | ICD-10-CM

## 2020-03-04 DIAGNOSIS — D64.9 ANEMIA REQUIRING TRANSFUSIONS: Primary | ICD-10-CM

## 2020-03-04 DIAGNOSIS — E11.22 CKD STAGE 3 DUE TO TYPE 2 DIABETES MELLITUS (HCC): ICD-10-CM

## 2020-03-04 DIAGNOSIS — N18.30 CKD STAGE 3 DUE TO TYPE 2 DIABETES MELLITUS (HCC): ICD-10-CM

## 2020-03-04 LAB
BASOPHILS # BLD AUTO: 0.12 10*3/MM3 (ref 0–0.2)
BASOPHILS NFR BLD AUTO: 1.6 % (ref 0–1.5)
DEPRECATED RDW RBC AUTO: 68.1 FL (ref 37–54)
EOSINOPHIL # BLD AUTO: 0.4 10*3/MM3 (ref 0–0.4)
EOSINOPHIL NFR BLD AUTO: 5.2 % (ref 0.3–6.2)
ERYTHROCYTE [DISTWIDTH] IN BLOOD BY AUTOMATED COUNT: 19.9 % (ref 12.3–15.4)
HCT VFR BLD AUTO: 29.9 % (ref 34–46.6)
HGB BLD-MCNC: 9.5 G/DL (ref 12–15.9)
IMM GRANULOCYTES # BLD AUTO: 0.1 10*3/MM3 (ref 0–0.05)
IMM GRANULOCYTES NFR BLD AUTO: 1.3 % (ref 0–0.5)
LYMPHOCYTES # BLD AUTO: 2.39 10*3/MM3 (ref 0.7–3.1)
LYMPHOCYTES NFR BLD AUTO: 30.9 % (ref 19.6–45.3)
MCH RBC QN AUTO: 30.8 PG (ref 26.6–33)
MCHC RBC AUTO-ENTMCNC: 31.8 G/DL (ref 31.5–35.7)
MCV RBC AUTO: 97.1 FL (ref 79–97)
MONOCYTES # BLD AUTO: 0.32 10*3/MM3 (ref 0.1–0.9)
MONOCYTES NFR BLD AUTO: 4.1 % (ref 5–12)
NEUTROPHILS # BLD AUTO: 4.4 10*3/MM3 (ref 1.7–7)
NEUTROPHILS NFR BLD AUTO: 56.9 % (ref 42.7–76)
NRBC BLD AUTO-RTO: 0 /100 WBC (ref 0–0.2)
PLATELET # BLD AUTO: 382 10*3/MM3 (ref 140–450)
PMV BLD AUTO: 11.9 FL (ref 6–12)
RBC # BLD AUTO: 3.08 10*6/MM3 (ref 3.77–5.28)
WBC NRBC COR # BLD: 7.73 10*3/MM3 (ref 3.4–10.8)

## 2020-03-04 PROCEDURE — 25010000002 EPOETIN ALFA PER 1000 UNITS: Performed by: INTERNAL MEDICINE

## 2020-03-04 PROCEDURE — 85025 COMPLETE CBC W/AUTO DIFF WBC: CPT | Performed by: INTERNAL MEDICINE

## 2020-03-04 PROCEDURE — 36415 COLL VENOUS BLD VENIPUNCTURE: CPT

## 2020-03-04 PROCEDURE — 96372 THER/PROPH/DIAG INJ SC/IM: CPT

## 2020-03-04 RX ADMIN — ERYTHROPOIETIN 60000 UNITS: 40000 INJECTION, SOLUTION INTRAVENOUS; SUBCUTANEOUS at 11:22

## 2020-03-04 NOTE — TELEPHONE ENCOUNTER
PT STATES SHE IS FEELING DIZZY (SAW [facility]LV[facility] RECENTLY) THINKS SHE MAY HAVE VERTIGO. WANTS TO KNOW WHAT [facility]LV[facility] WOULD LIKE FOR HER TO DO? STATES SHE JUST DOESN'T FEEL GOOD-NAUSEATED.

## 2020-03-11 ENCOUNTER — LAB (OUTPATIENT)
Dept: LAB | Facility: HOSPITAL | Age: 80
End: 2020-03-11

## 2020-03-11 ENCOUNTER — INFUSION (OUTPATIENT)
Dept: ONCOLOGY | Facility: HOSPITAL | Age: 80
End: 2020-03-11

## 2020-03-11 VITALS
OXYGEN SATURATION: 94 % | TEMPERATURE: 98.6 F | SYSTOLIC BLOOD PRESSURE: 103 MMHG | DIASTOLIC BLOOD PRESSURE: 70 MMHG | HEART RATE: 80 BPM

## 2020-03-11 DIAGNOSIS — E11.22 CKD STAGE 3 DUE TO TYPE 2 DIABETES MELLITUS (HCC): ICD-10-CM

## 2020-03-11 DIAGNOSIS — D46.C MYELODYSPLASTIC SYNDROME WITH 5Q DELETION (HCC): ICD-10-CM

## 2020-03-11 DIAGNOSIS — D64.9 ANEMIA REQUIRING TRANSFUSIONS: Primary | ICD-10-CM

## 2020-03-11 DIAGNOSIS — D64.9 ANEMIA REQUIRING TRANSFUSIONS: ICD-10-CM

## 2020-03-11 DIAGNOSIS — N18.30 CKD STAGE 3 DUE TO TYPE 2 DIABETES MELLITUS (HCC): ICD-10-CM

## 2020-03-11 LAB
BASOPHILS # BLD AUTO: 0.12 10*3/MM3 (ref 0–0.2)
BASOPHILS NFR BLD AUTO: 1.8 % (ref 0–1.5)
DEPRECATED RDW RBC AUTO: 66.3 FL (ref 37–54)
EOSINOPHIL # BLD AUTO: 0.42 10*3/MM3 (ref 0–0.4)
EOSINOPHIL NFR BLD AUTO: 6.4 % (ref 0.3–6.2)
ERYTHROCYTE [DISTWIDTH] IN BLOOD BY AUTOMATED COUNT: 21 % (ref 12.3–15.4)
HCT VFR BLD AUTO: 25.1 % (ref 34–46.6)
HGB BLD-MCNC: 8.5 G/DL (ref 12–15.9)
IMM GRANULOCYTES # BLD AUTO: 0.09 10*3/MM3 (ref 0–0.05)
IMM GRANULOCYTES NFR BLD AUTO: 1.4 % (ref 0–0.5)
LYMPHOCYTES # BLD AUTO: 2.3 10*3/MM3 (ref 0.7–3.1)
LYMPHOCYTES NFR BLD AUTO: 35.2 % (ref 19.6–45.3)
MCH RBC QN AUTO: 31.7 PG (ref 26.6–33)
MCHC RBC AUTO-ENTMCNC: 33.9 G/DL (ref 31.5–35.7)
MCV RBC AUTO: 93.7 FL (ref 79–97)
MONOCYTES # BLD AUTO: 0.22 10*3/MM3 (ref 0.1–0.9)
MONOCYTES NFR BLD AUTO: 3.4 % (ref 5–12)
NEUTROPHILS # BLD AUTO: 3.38 10*3/MM3 (ref 1.7–7)
NEUTROPHILS NFR BLD AUTO: 51.8 % (ref 42.7–76)
NRBC BLD AUTO-RTO: 0 /100 WBC (ref 0–0.2)
PLATELET # BLD AUTO: 374 10*3/MM3 (ref 140–450)
PMV BLD AUTO: 12.1 FL (ref 6–12)
RBC # BLD AUTO: 2.68 10*6/MM3 (ref 3.77–5.28)
WBC NRBC COR # BLD: 6.53 10*3/MM3 (ref 3.4–10.8)

## 2020-03-11 PROCEDURE — 25010000002 EPOETIN ALFA PER 1000 UNITS: Performed by: INTERNAL MEDICINE

## 2020-03-11 PROCEDURE — 85025 COMPLETE CBC W/AUTO DIFF WBC: CPT | Performed by: INTERNAL MEDICINE

## 2020-03-11 PROCEDURE — 36415 COLL VENOUS BLD VENIPUNCTURE: CPT

## 2020-03-11 PROCEDURE — 96372 THER/PROPH/DIAG INJ SC/IM: CPT

## 2020-03-11 RX ADMIN — ERYTHROPOIETIN 60000 UNITS: 40000 INJECTION, SOLUTION INTRAVENOUS; SUBCUTANEOUS at 10:01

## 2020-03-18 ENCOUNTER — LAB (OUTPATIENT)
Dept: LAB | Facility: HOSPITAL | Age: 80
End: 2020-03-18

## 2020-03-18 ENCOUNTER — HOSPITAL ENCOUNTER (OUTPATIENT)
Dept: INFUSION THERAPY | Facility: HOSPITAL | Age: 80
Discharge: HOME | End: 2020-03-18
Admitting: INTERNAL MEDICINE

## 2020-03-18 ENCOUNTER — INFUSION (OUTPATIENT)
Dept: ONCOLOGY | Facility: HOSPITAL | Age: 80
End: 2020-03-18

## 2020-03-18 VITALS
RESPIRATION RATE: 16 BRPM | HEART RATE: 98 BPM | DIASTOLIC BLOOD PRESSURE: 72 MMHG | TEMPERATURE: 98.5 F | OXYGEN SATURATION: 96 % | SYSTOLIC BLOOD PRESSURE: 162 MMHG

## 2020-03-18 DIAGNOSIS — D64.9 ANEMIA REQUIRING TRANSFUSIONS: Primary | ICD-10-CM

## 2020-03-18 DIAGNOSIS — N18.30 CKD STAGE 3 DUE TO TYPE 2 DIABETES MELLITUS (HCC): ICD-10-CM

## 2020-03-18 DIAGNOSIS — E11.22 CKD STAGE 3 DUE TO TYPE 2 DIABETES MELLITUS (HCC): ICD-10-CM

## 2020-03-18 DIAGNOSIS — M25.561 ACUTE PAIN OF RIGHT KNEE: ICD-10-CM

## 2020-03-18 DIAGNOSIS — F32.A ANXIETY AND DEPRESSION: ICD-10-CM

## 2020-03-18 DIAGNOSIS — F41.9 ANXIETY AND DEPRESSION: ICD-10-CM

## 2020-03-18 DIAGNOSIS — D46.C MYELODYSPLASTIC SYNDROME WITH 5Q DELETION (HCC): ICD-10-CM

## 2020-03-18 DIAGNOSIS — D64.9 ANEMIA REQUIRING TRANSFUSIONS: ICD-10-CM

## 2020-03-18 LAB
ABO GROUP BLD: NORMAL
BASOPHILS # BLD AUTO: 0.15 10*3/MM3 (ref 0–0.2)
BASOPHILS NFR BLD AUTO: 2.6 % (ref 0–1.5)
BLD GP AB SCN SERPL QL: NEGATIVE
DEPRECATED RDW RBC AUTO: 76.4 FL (ref 37–54)
EOSINOPHIL # BLD AUTO: 0.33 10*3/MM3 (ref 0–0.4)
EOSINOPHIL NFR BLD AUTO: 5.7 % (ref 0.3–6.2)
ERYTHROCYTE [DISTWIDTH] IN BLOOD BY AUTOMATED COUNT: 22.2 % (ref 12.3–15.4)
HCT VFR BLD AUTO: 25.1 % (ref 34–46.6)
HGB BLD-MCNC: 7.9 G/DL (ref 12–15.9)
IMM GRANULOCYTES # BLD AUTO: 0.03 10*3/MM3 (ref 0–0.05)
IMM GRANULOCYTES NFR BLD AUTO: 0.5 % (ref 0–0.5)
LYMPHOCYTES # BLD AUTO: 2.34 10*3/MM3 (ref 0.7–3.1)
LYMPHOCYTES NFR BLD AUTO: 40.7 % (ref 19.6–45.3)
MCH RBC QN AUTO: 31.7 PG (ref 26.6–33)
MCHC RBC AUTO-ENTMCNC: 31.5 G/DL (ref 31.5–35.7)
MCV RBC AUTO: 100.8 FL (ref 79–97)
MONOCYTES # BLD AUTO: 0.23 10*3/MM3 (ref 0.1–0.9)
MONOCYTES NFR BLD AUTO: 4 % (ref 5–12)
NEUTROPHILS # BLD AUTO: 2.67 10*3/MM3 (ref 1.7–7)
NEUTROPHILS NFR BLD AUTO: 46.5 % (ref 42.7–76)
PLATELET # BLD AUTO: 397 10*3/MM3 (ref 140–450)
PMV BLD AUTO: 11.2 FL (ref 6–12)
RBC # BLD AUTO: 2.49 10*6/MM3 (ref 3.77–5.28)
RH BLD: POSITIVE
T&S EXPIRATION DATE: NORMAL
WBC NRBC COR # BLD: 5.75 10*3/MM3 (ref 3.4–10.8)

## 2020-03-18 PROCEDURE — G0463 HOSPITAL OUTPT CLINIC VISIT: HCPCS

## 2020-03-18 PROCEDURE — A9270 NON-COVERED ITEM OR SERVICE: HCPCS | Performed by: INTERNAL MEDICINE

## 2020-03-18 PROCEDURE — 86900 BLOOD TYPING SEROLOGIC ABO: CPT

## 2020-03-18 PROCEDURE — 86901 BLOOD TYPING SEROLOGIC RH(D): CPT | Performed by: INTERNAL MEDICINE

## 2020-03-18 PROCEDURE — 85025 COMPLETE CBC W/AUTO DIFF WBC: CPT | Performed by: INTERNAL MEDICINE

## 2020-03-18 PROCEDURE — 63710000001 DIPHENHYDRAMINE PER 50 MG: Performed by: INTERNAL MEDICINE

## 2020-03-18 PROCEDURE — 36415 COLL VENOUS BLD VENIPUNCTURE: CPT

## 2020-03-18 PROCEDURE — P9016 RBC LEUKOCYTES REDUCED: HCPCS

## 2020-03-18 PROCEDURE — 86850 RBC ANTIBODY SCREEN: CPT | Performed by: INTERNAL MEDICINE

## 2020-03-18 PROCEDURE — 63710000001 ACETAMINOPHEN 325 MG TABLET: Performed by: INTERNAL MEDICINE

## 2020-03-18 PROCEDURE — 86923 COMPATIBILITY TEST ELECTRIC: CPT

## 2020-03-18 PROCEDURE — 36430 TRANSFUSION BLD/BLD COMPNT: CPT

## 2020-03-18 PROCEDURE — 86900 BLOOD TYPING SEROLOGIC ABO: CPT | Performed by: INTERNAL MEDICINE

## 2020-03-18 RX ORDER — DIPHENHYDRAMINE HCL 25 MG
25 CAPSULE ORAL ONCE
Status: COMPLETED | OUTPATIENT
Start: 2020-03-18 | End: 2020-03-18

## 2020-03-18 RX ORDER — SODIUM CHLORIDE 9 MG/ML
250 INJECTION, SOLUTION INTRAVENOUS AS NEEDED
Status: CANCELLED | OUTPATIENT
Start: 2020-03-18

## 2020-03-18 RX ORDER — ACETAMINOPHEN 325 MG/1
650 TABLET ORAL ONCE
Status: COMPLETED | OUTPATIENT
Start: 2020-03-18 | End: 2020-03-18

## 2020-03-18 RX ORDER — GABAPENTIN 400 MG/1
CAPSULE ORAL
Qty: 120 CAPSULE | Refills: 0 | Status: SHIPPED | OUTPATIENT
Start: 2020-03-18 | End: 2020-04-08

## 2020-03-18 RX ORDER — DESVENLAFAXINE SUCCINATE 50 MG/1
TABLET, EXTENDED RELEASE ORAL
Qty: 30 TABLET | Refills: 0 | Status: SHIPPED | OUTPATIENT
Start: 2020-03-18 | End: 2020-04-08

## 2020-03-18 RX ORDER — ACETAMINOPHEN 325 MG/1
650 TABLET ORAL ONCE
Status: CANCELLED | OUTPATIENT
Start: 2020-03-18 | End: 2020-03-18

## 2020-03-18 RX ORDER — DIPHENHYDRAMINE HCL 25 MG
25 CAPSULE ORAL ONCE
Status: CANCELLED | OUTPATIENT
Start: 2020-03-18 | End: 2020-03-18

## 2020-03-18 RX ORDER — SODIUM CHLORIDE 9 MG/ML
INJECTION, SOLUTION INTRAVENOUS
Status: COMPLETED
Start: 2020-03-18 | End: 2020-03-18

## 2020-03-18 RX ORDER — SODIUM CHLORIDE 9 MG/ML
250 INJECTION, SOLUTION INTRAVENOUS AS NEEDED
Status: DISCONTINUED | OUTPATIENT
Start: 2020-03-18 | End: 2020-03-20 | Stop reason: HOSPADM

## 2020-03-18 RX ADMIN — SODIUM CHLORIDE 250 ML: 9 INJECTION, SOLUTION INTRAVENOUS at 15:05

## 2020-03-18 RX ADMIN — ACETAMINOPHEN 650 MG: 325 TABLET, FILM COATED ORAL at 12:18

## 2020-03-18 RX ADMIN — DIPHENHYDRAMINE HYDROCHLORIDE 25 MG: 25 CAPSULE ORAL at 12:18

## 2020-03-18 NOTE — PATIENT INSTRUCTIONS
"Call [facility] at [phone] if you have any problems or concerns.    We know you have a Choice in healthcare and appreciate you using [facility].  Our purpose is to provide you \"Excellent Care\".  We hope that you will always choose us in the future and continue to recommend us to your family and friends.      Blood Transfusion, Adult, Care After  This sheet gives you information about how to care for yourself after your procedure. Your doctor may also give you more specific instructions. If you have problems or questions, contact your doctor.  Follow these instructions at home:    · Take over-the-counter and prescription medicines only as told by your doctor.  · Go back to your normal activities as told by your doctor.  · Follow instructions from your doctor about how to take care of the area where an IV tube was put into your vein (insertion site). Make sure you:  ? Wash your hands with soap and water before you change your bandage (dressing). If there is no soap and water, use hand .  ? Change your bandage as told by your doctor.  · Check your IV insertion site every day for signs of infection. Check for:  ? More redness, swelling, or pain.  ? More fluid or blood.  ? Warmth.  ? Pus or a bad smell.  Contact a doctor if:  · You have more redness, swelling, or pain around the IV insertion site.  · You have more fluid or blood coming from the IV insertion site.  · Your IV insertion site feels warm to the touch.  · You have pus or a bad smell coming from the IV insertion site.  · Your pee (urine) turns pink, red, or brown.  · You feel weak after doing your normal activities.  Get help right away if:  · You have signs of a serious allergic or body defense (immune) system reaction, including:  ? Itchiness.  ? Hives.  ? Trouble breathing.  ? Anxiety.  ? Pain in your chest or lower back.  ? Fever, flushing, and chills.  ? Fast pulse.  ? Rash.  ? Watery poop (diarrhea).  ? Throwing up " (vomiting).  ? Dark pee.  ? Serious headache.  ? Dizziness.  ? Stiff neck.  ? Yellow color in your face or the white parts of your eyes (jaundice).  Summary  · After a blood transfusion, return to your normal activities as told by your doctor.  · Every day, check for signs of infection where the IV tube was put into your vein.  · Some signs of infection are warm skin, more redness and pain, more fluid or blood, and pus or a bad smell where the needle went in.  · Contact your doctor if you feel weak or have any unusual symptoms.  This information is not intended to replace advice given to you by your health care provider. Make sure you discuss any questions you have with your health care provider.  Document Released: 01/08/2016 Document Revised: 08/11/2017 Document Reviewed: 08/11/2017  [employer] Interactive Patient Education © 2020 [employer]

## 2020-03-18 NOTE — TELEPHONE ENCOUNTER
Patient called to say that she has another UTI - and is asking for medication to be sent to [facility]. She is miserable.

## 2020-03-18 NOTE — NURSING NOTE
NURSING PROGRESS NOTE: Patient arrived to [facility] per W/C from MD office at 1115.  Here for blood transfusion.  Assisted to bed.  Consent signed.  History and medications reviewed.  Procedure performed without incident. Lunch provided by her son.  See transfusion record for documentation.  Transfusion of 2 units PRBC completed at 1730.  AVS reviewed with patient and a copy provided.  Escorted to [facility] per W/C at 1750 and discharged home with her son. ANISH Kathleen

## 2020-03-19 ENCOUNTER — TELEPHONE (OUTPATIENT)
Dept: INTERNAL MEDICINE | Facility: CLINIC | Age: 80
End: 2020-03-19

## 2020-03-20 ENCOUNTER — TELEPHONE (OUTPATIENT)
Dept: INTERNAL MEDICINE | Facility: CLINIC | Age: 80
End: 2020-03-20

## 2020-03-20 RX ORDER — CEPHALEXIN 500 MG/1
500 CAPSULE ORAL 2 TIMES DAILY
Qty: 14 CAPSULE | Refills: 0 | Status: SHIPPED | OUTPATIENT
Start: 2020-03-20 | End: 2020-03-27

## 2020-03-20 NOTE — TELEPHONE ENCOUNTER
Patient called again very upset because dr burnham hasn't sent any med for her yet. She is miserable with her UTI and short of breath.  Patient has regular cough daily. No fever, temp was 96 degrees. Please call something in for her.     [facility] in [place].  Please call patient.

## 2020-03-23 ENCOUNTER — TELEPHONE (OUTPATIENT)
Dept: ONCOLOGY | Facility: CLINIC | Age: 80
End: 2020-03-23

## 2020-03-23 NOTE — TELEPHONE ENCOUNTER
----- Message from Mildred Contreras RN sent at 3/23/2020 12:38 PM EDT -----  Cancel appts for this week.   ----- Message -----  From: Elieser Headley MD  Sent: 3/23/2020  12:30 PM EDT  To: Mildred Contreras RN    Not sure how to answer this as there is no good option.  She was transfused last week so I would think she is okay until next week unless having symptoms of anemia.  ----- Message -----  From: Mildred Contreras RN  Sent: 3/23/2020  10:48 AM EDT  To: Elieser Headley MD    Hi,    Pt is scheduled to come in Wednesday for procrit.  However, her son and daughter in law have been exposed to corona and are in self quarantine.  They can't bring her Wednesday, but shes in the same house so she really shouldn't come either.  Wants to know what to do as sometimes she needs transfusions?

## 2020-03-24 RX ORDER — ATORVASTATIN CALCIUM 10 MG/1
TABLET, FILM COATED ORAL
Qty: 90 TABLET | Refills: 1 | Status: SHIPPED | OUTPATIENT
Start: 2020-03-24 | End: 2020-09-08

## 2020-03-25 ENCOUNTER — APPOINTMENT (OUTPATIENT)
Dept: LAB | Facility: HOSPITAL | Age: 80
End: 2020-03-25

## 2020-03-25 ENCOUNTER — APPOINTMENT (OUTPATIENT)
Dept: ONCOLOGY | Facility: HOSPITAL | Age: 80
End: 2020-03-25

## 2020-03-30 ENCOUNTER — DOCUMENTATION (OUTPATIENT)
Dept: ONCOLOGY | Facility: CLINIC | Age: 80
End: 2020-03-30

## 2020-03-30 ENCOUNTER — TELEPHONE (OUTPATIENT)
Dept: ONCOLOGY | Facility: CLINIC | Age: 80
End: 2020-03-30

## 2020-03-30 NOTE — PROGRESS NOTES
Clinical Case Management/[place]:    OSW was requested to contact the pt for transportation assistance for her appointment on 2020 at the [place] Office.    OSW spoke briefly with the pt who handed the telephone to her son, Isaac, with whom she resides, along with her daughter-in-law, Mayra.  Isaac provided a brief history of the pt's medical issues and some social history.     The pt has resided with Isaac and Mayra since , when the pt's  ; the family resides in [place], [place] in [facility].  She uses her motorized wheelchair in the home and a stationary wheelchair when outside the home. The family typically transports the pt to her medical appointments.  Isaac explained that he and his wife are under quarantine until 2020, when he is scheduled to return to work; he reported that he remains asymptomatic to date.  Brain stated that his preference is to transport his mother, if at all possible, so that she would not have to ride with a stranger. He stated that currently, she is not showing signs of fatigue, which is when he knows she needs procrit.  Since the pt is not LYFT-eligible since she is not ambulatory, OSW informed Isaac of [employer], a private medical transportation service serving his county; the cost would be covered by the [facility].    OSW offered to contact Dr. Headley to see if he felt it was medically appropriate to postpone this appointment until next week.  Dr. Headley advised waiting until next week. Isaac was informed by this OSW of Dr. Headley's decision.  OSW sent a message to scheduling to change the appointment to next week.  The appointment is now scheduled for 2020.     OSW remains available as needs arise.    Cadence Mendoza [facility]  Oncology Social Worker

## 2020-03-30 NOTE — TELEPHONE ENCOUNTER
----- Message from Mildred Contreras RN sent at 3/30/2020 10:56 AM EDT -----  Yes please cancel, no she does not know.   ----- Message -----  From: Yandy Barrientos  Sent: 3/30/2020  10:55 AM EDT  To: Mildred Contreras RN    Pt is already scheduled for next week, so I just need to cancel this weeks apt correct? And does the patient know? Thank you!  ----- Message -----  From: Mildred Contreras RN  Sent: 3/30/2020  10:52 AM EDT  To: [facility]  [facility]    Please r/s.   ----- Message -----  From: Cadence Mendoza LCSW  Sent: 3/30/2020  10:49 AM EDT  To: Mildred Contreras RN    I've been in communication with Dr. Headley.  The pt is not symtomatic. Dr. Headley has requested to move her appointment to next week.    Thanks much,  Cadence  ----- Message -----  From: Mildred Contreras RN  Sent: 3/30/2020   9:24 AM EDT  To: [place] Scruggs, #    I'm honestly not sure who is setting up rides for pts anymore. This message is from the hub.  Thanks!      Missed injection last week. She has another appointment this Wednesday.(4/1) Her family cannot bring her. She said they were under quarantine.     Can she get transportation to this appointment on Wednesday set up through the office?     [phone]

## 2020-03-30 NOTE — TELEPHONE ENCOUNTER
Missed injection last week. She has another appointment this Wednesday.(4/1) Her family cannot bring her. She said they were under quarantine.    Can she get transportation to this appointment on Wednesday set up through the office?    [phone]

## 2020-04-01 ENCOUNTER — TELEPHONE (OUTPATIENT)
Dept: ONCOLOGY | Facility: CLINIC | Age: 80
End: 2020-04-01

## 2020-04-01 ENCOUNTER — APPOINTMENT (OUTPATIENT)
Dept: ONCOLOGY | Facility: HOSPITAL | Age: 80
End: 2020-04-01

## 2020-04-01 ENCOUNTER — APPOINTMENT (OUTPATIENT)
Dept: LAB | Facility: HOSPITAL | Age: 80
End: 2020-04-01

## 2020-04-01 NOTE — TELEPHONE ENCOUNTER
Isaac Singh called on behalf of the patient and said someone called him about an ambulance coming to  patient to have her hemoglobin checked next week, says pt has been dizzy and her energy ok ,doesn't think patient needs to come in,  wants someone to call him back at   [phone]

## 2020-04-01 NOTE — TELEPHONE ENCOUNTER
Pt son states the pt did laundry and shower yesterday. He states she didn't start feeling really bad until today. He states he does not want to her in the ED and doesnt understand why we are recommending to send her to the ED. I explainer per Judy it is was the best safest option for the pt at this time. He requested I speak to Dr. Headley tomorrow and call him back tomorrow. I advised him to call us or EMS if his mother declines overnight. He V/U.

## 2020-04-01 NOTE — TELEPHONE ENCOUNTER
Pt C/O extreme dizziness and when she stands up she falls back into chair. This has been going on for 4 days. Her son and daughter in law are quarantined in the same house and cannot leave until 4/6. She has no one at all that can bring her to our office. D/W Judy Stokes NP. Per Judy pt should call EMS at this point based on her most recent labs. Pt informed to call EMS. Also informed her to let EMS know she is living in a home with 2 people who are quarantined for suspected Covid-19 so the proper precautions can be taken. I called the pt back to see if she was going to have EMS take her to [facility] so I could give them report. She stated her son and daughter in law would not allow her to go, that is would re-exsposed them and mess up their quarantine  if she went to the ED. I explained to her this was not up to them and her health and well being is top priority. The pt states she appreciated what I was doing for her and that she loves everyone at [facility], but she had to get of the phone because she was so upset. Judy Stokes Informed.

## 2020-04-02 ENCOUNTER — TELEPHONE (OUTPATIENT)
Dept: ONCOLOGY | Facility: HOSPITAL | Age: 80
End: 2020-04-02

## 2020-04-02 NOTE — TELEPHONE ENCOUNTER
Per Dr. Headley if pt is symptomatic she needs to go to ED by EMS if nobody can drive her. Pt's son Isaac is waiting until he is no longer in quarantine. To take her. I again advised him to call EMS to take her to ED. He declined. He said he would watch her and call EMS if he felt like it was needed. I advised him he would need to inform EMS that she is in the home with 2 individuals in quarantine to for possible covid-19 so they can take the appropriate precautions. Pt son V/U

## 2020-04-02 NOTE — TELEPHONE ENCOUNTER
----- Message from Elieser Headley MD sent at 4/2/2020  8:43 AM EDT -----  I gave my opinion  ----- Message -----  From: Mayra Snow RN  Sent: 4/2/2020   8:39 AM EDT  To: Elieser Headley MD    Her son is refusing to let her go. He says shes fine. He wants your opinion. Her numbers have been low  ----- Message -----  From: Elieser Headley MD  Sent: 4/2/2020   8:25 AM EDT  To: Mayra Snow RN    Hi. Got your chat.  If Ms Singh is that symptomatic she needs to come in for CBC.  If her family cant get her there I guess would have to call EMS.  She would need to alert EMS that other people quarantined in home s    ----- Message -----  From: Mayra Snow RN  Sent: 4/1/2020   5:22 PM EDT  To: Elieser Headley MD    Please call me on Thursday regarding this pt if possible [phone]

## 2020-04-03 ENCOUNTER — TELEPHONE (OUTPATIENT)
Dept: ONCOLOGY | Facility: CLINIC | Age: 80
End: 2020-04-03

## 2020-04-06 ENCOUNTER — TELEPHONE (OUTPATIENT)
Dept: ONCOLOGY | Facility: CLINIC | Age: 80
End: 2020-04-06

## 2020-04-08 ENCOUNTER — APPOINTMENT (OUTPATIENT)
Dept: LAB | Facility: HOSPITAL | Age: 80
End: 2020-04-08

## 2020-04-08 ENCOUNTER — INFUSION (OUTPATIENT)
Dept: ONCOLOGY | Facility: HOSPITAL | Age: 80
End: 2020-04-08

## 2020-04-08 ENCOUNTER — LAB (OUTPATIENT)
Dept: LAB | Facility: HOSPITAL | Age: 80
End: 2020-04-08

## 2020-04-08 ENCOUNTER — APPOINTMENT (OUTPATIENT)
Dept: ONCOLOGY | Facility: HOSPITAL | Age: 80
End: 2020-04-08

## 2020-04-08 VITALS
DIASTOLIC BLOOD PRESSURE: 70 MMHG | SYSTOLIC BLOOD PRESSURE: 105 MMHG | OXYGEN SATURATION: 96 % | TEMPERATURE: 98.2 F | HEART RATE: 78 BPM

## 2020-04-08 DIAGNOSIS — F41.9 ANXIETY AND DEPRESSION: ICD-10-CM

## 2020-04-08 DIAGNOSIS — D46.C MYELODYSPLASTIC SYNDROME WITH 5Q DELETION (HCC): ICD-10-CM

## 2020-04-08 DIAGNOSIS — M25.561 ACUTE PAIN OF RIGHT KNEE: ICD-10-CM

## 2020-04-08 DIAGNOSIS — D47.2 MONOCLONAL GAMMOPATHY OF UNKNOWN SIGNIFICANCE (MGUS): ICD-10-CM

## 2020-04-08 DIAGNOSIS — F32.A ANXIETY AND DEPRESSION: ICD-10-CM

## 2020-04-08 DIAGNOSIS — D64.9 ANEMIA REQUIRING TRANSFUSIONS: Primary | ICD-10-CM

## 2020-04-08 DIAGNOSIS — E11.22 CKD STAGE 3 DUE TO TYPE 2 DIABETES MELLITUS (HCC): ICD-10-CM

## 2020-04-08 DIAGNOSIS — N18.30 CKD STAGE 3 DUE TO TYPE 2 DIABETES MELLITUS (HCC): ICD-10-CM

## 2020-04-08 DIAGNOSIS — D64.9 ANEMIA REQUIRING TRANSFUSIONS: ICD-10-CM

## 2020-04-08 LAB
BASOPHILS # BLD AUTO: 0.16 10*3/MM3 (ref 0–0.2)
BASOPHILS NFR BLD AUTO: 3 % (ref 0–1.5)
DEPRECATED RDW RBC AUTO: 84.8 FL (ref 37–54)
EOSINOPHIL # BLD AUTO: 0.3 10*3/MM3 (ref 0–0.4)
EOSINOPHIL NFR BLD AUTO: 5.7 % (ref 0.3–6.2)
ERYTHROCYTE [DISTWIDTH] IN BLOOD BY AUTOMATED COUNT: 24.4 % (ref 12.3–15.4)
FERRITIN SERPL-MCNC: 1313 NG/ML (ref 13–150)
HCT VFR BLD AUTO: 26.9 % (ref 34–46.6)
HGB BLD-MCNC: 8.4 G/DL (ref 12–15.9)
IMM GRANULOCYTES # BLD AUTO: 0.05 10*3/MM3 (ref 0–0.05)
IMM GRANULOCYTES NFR BLD AUTO: 0.9 % (ref 0–0.5)
IRON 24H UR-MRATE: 134 MCG/DL (ref 37–145)
IRON SATN MFR SERPL: ABNORMAL %
LYMPHOCYTES # BLD AUTO: 2.03 10*3/MM3 (ref 0.7–3.1)
LYMPHOCYTES NFR BLD AUTO: 38.4 % (ref 19.6–45.3)
MCH RBC QN AUTO: 31 PG (ref 26.6–33)
MCHC RBC AUTO-ENTMCNC: 31.2 G/DL (ref 31.5–35.7)
MCV RBC AUTO: 99.3 FL (ref 79–97)
MONOCYTES # BLD AUTO: 0.21 10*3/MM3 (ref 0.1–0.9)
MONOCYTES NFR BLD AUTO: 4 % (ref 5–12)
NEUTROPHILS # BLD AUTO: 2.54 10*3/MM3 (ref 1.7–7)
NEUTROPHILS NFR BLD AUTO: 48 % (ref 42.7–76)
PLATELET # BLD AUTO: 370 10*3/MM3 (ref 140–450)
PMV BLD AUTO: 10.8 FL (ref 6–12)
RBC # BLD AUTO: 2.71 10*6/MM3 (ref 3.77–5.28)
TIBC SERPL-MCNC: ABNORMAL UG/DL
UIBC SERPL-MCNC: <16 MCG/DL (ref 112–346)
VIT B12 BLD-MCNC: 299 PG/ML (ref 211–946)
WBC NRBC COR # BLD: 5.29 10*3/MM3 (ref 3.4–10.8)

## 2020-04-08 PROCEDURE — 85025 COMPLETE CBC W/AUTO DIFF WBC: CPT | Performed by: INTERNAL MEDICINE

## 2020-04-08 PROCEDURE — 86334 IMMUNOFIX E-PHORESIS SERUM: CPT | Performed by: INTERNAL MEDICINE

## 2020-04-08 PROCEDURE — 83540 ASSAY OF IRON: CPT | Performed by: INTERNAL MEDICINE

## 2020-04-08 PROCEDURE — 82607 VITAMIN B-12: CPT | Performed by: INTERNAL MEDICINE

## 2020-04-08 PROCEDURE — 25010000002 EPOETIN ALFA PER 1000 UNITS: Performed by: INTERNAL MEDICINE

## 2020-04-08 PROCEDURE — 84155 ASSAY OF PROTEIN SERUM: CPT | Performed by: INTERNAL MEDICINE

## 2020-04-08 PROCEDURE — 36415 COLL VENOUS BLD VENIPUNCTURE: CPT

## 2020-04-08 PROCEDURE — 84165 PROTEIN E-PHORESIS SERUM: CPT | Performed by: INTERNAL MEDICINE

## 2020-04-08 PROCEDURE — 83550 IRON BINDING TEST: CPT | Performed by: INTERNAL MEDICINE

## 2020-04-08 PROCEDURE — 96372 THER/PROPH/DIAG INJ SC/IM: CPT

## 2020-04-08 PROCEDURE — 82784 ASSAY IGA/IGD/IGG/IGM EACH: CPT | Performed by: INTERNAL MEDICINE

## 2020-04-08 PROCEDURE — 83883 ASSAY NEPHELOMETRY NOT SPEC: CPT | Performed by: INTERNAL MEDICINE

## 2020-04-08 PROCEDURE — 82728 ASSAY OF FERRITIN: CPT | Performed by: INTERNAL MEDICINE

## 2020-04-08 RX ORDER — APIXABAN 5 MG/1
TABLET, FILM COATED ORAL
Qty: 60 TABLET | Refills: 0 | Status: SHIPPED | OUTPATIENT
Start: 2020-04-08 | End: 2020-05-06

## 2020-04-08 RX ORDER — MIDODRINE HYDROCHLORIDE 2.5 MG/1
TABLET ORAL
Qty: 90 TABLET | Refills: 0 | Status: SHIPPED | OUTPATIENT
Start: 2020-04-08 | End: 2020-05-08

## 2020-04-08 RX ORDER — DESVENLAFAXINE SUCCINATE 50 MG/1
TABLET, EXTENDED RELEASE ORAL
Qty: 30 TABLET | Refills: 0 | Status: SHIPPED | OUTPATIENT
Start: 2020-04-08 | End: 2020-05-06

## 2020-04-08 RX ORDER — GABAPENTIN 400 MG/1
CAPSULE ORAL
Qty: 120 CAPSULE | Refills: 0 | Status: SHIPPED | OUTPATIENT
Start: 2020-04-08 | End: 2020-05-06

## 2020-04-08 RX ORDER — FUROSEMIDE 20 MG/1
TABLET ORAL
Qty: 90 TABLET | Refills: 1 | Status: SHIPPED | OUTPATIENT
Start: 2020-04-08 | End: 2020-07-30 | Stop reason: HOSPADM

## 2020-04-08 RX ADMIN — ERYTHROPOIETIN 60000 UNITS: 40000 INJECTION, SOLUTION INTRAVENOUS; SUBCUTANEOUS at 10:47

## 2020-04-09 ENCOUNTER — OFFICE VISIT (OUTPATIENT)
Dept: CARDIOLOGY | Facility: CLINIC | Age: 80
End: 2020-04-09

## 2020-04-09 DIAGNOSIS — I95.1 ORTHOSTATIC HYPOTENSION: ICD-10-CM

## 2020-04-09 DIAGNOSIS — E78.2 MIXED HYPERLIPIDEMIA: ICD-10-CM

## 2020-04-09 DIAGNOSIS — I10 ESSENTIAL HYPERTENSION: ICD-10-CM

## 2020-04-09 DIAGNOSIS — K21.9 GASTROESOPHAGEAL REFLUX DISEASE, ESOPHAGITIS PRESENCE NOT SPECIFIED: ICD-10-CM

## 2020-04-09 DIAGNOSIS — D47.2 MONOCLONAL GAMMOPATHY OF UNKNOWN SIGNIFICANCE (MGUS): ICD-10-CM

## 2020-04-09 DIAGNOSIS — I50.32 CHRONIC DIASTOLIC CHF (CONGESTIVE HEART FAILURE) (HCC): Primary | ICD-10-CM

## 2020-04-09 DIAGNOSIS — G47.33 OBSTRUCTIVE SLEEP APNEA SYNDROME: ICD-10-CM

## 2020-04-09 DIAGNOSIS — I82.499 DEEP VEIN THROMBOSIS (DVT) OF OTHER VEIN OF LOWER EXTREMITY, UNSPECIFIED CHRONICITY, UNSPECIFIED LATERALITY (HCC): ICD-10-CM

## 2020-04-09 LAB
ALBUMIN SERPL-MCNC: 3.5 G/DL (ref 2.9–4.4)
ALBUMIN/GLOB SERPL: 1.2 {RATIO} (ref 0.7–1.7)
ALPHA1 GLOB FLD ELPH-MCNC: 0.3 G/DL (ref 0–0.4)
ALPHA2 GLOB SERPL ELPH-MCNC: 0.7 G/DL (ref 0.4–1)
B-GLOBULIN SERPL ELPH-MCNC: 1 G/DL (ref 0.7–1.3)
GAMMA GLOB SERPL ELPH-MCNC: 1 G/DL (ref 0.4–1.8)
GLOBULIN SER CALC-MCNC: 3 G/DL (ref 2.2–3.9)
KAPPA LC SERPL-MCNC: 84.1 MG/L (ref 3.3–19.4)
KAPPA LC/LAMBDA SER: 1.77 {RATIO} (ref 0.26–1.65)
LAMBDA LC FREE SERPL-MCNC: 47.5 MG/L (ref 5.7–26.3)
Lab: NORMAL
M-SPIKE: NORMAL G/DL
PROT SERPL-MCNC: 6.5 G/DL (ref 6–8.5)

## 2020-04-09 PROCEDURE — 99443 PR PHYS/QHP TELEPHONE EVALUATION 21-30 MIN: CPT | Performed by: NURSE PRACTITIONER

## 2020-04-09 NOTE — PROGRESS NOTES
"  Date of Office Visit: 2020  Encounter Provider: REY Castro  Place of Service: [facility] CARDIOLOGY  Patient Name: Joya Singh  :1940  Primary Cardiologist: Dr. Sanon    CC:  6 month follow up    Dear Dr. Mercado    HPI: Joya Singh is a pleasant 79 y.o. female who presents 2020 for cardiac follow up.     This patient has consented to a telehealth visit via telephone. The visit was scheduled as a telephone visit to comply with patient safety concerns in accordance with CDC recommendations.  All vitals recorded within this visit are reported by the patient.  I spent  25 minutes in total including but not limited to the 21 minutes spent in direct conversation with this patient.     She carried a reported history of CAD, but a cardiac cath was performed at [facility] in , and showed no significant intraluminal coronary atherosclerosis.  There was extraluminal coronary artery calcification.  She has brittle diabetes, a history of stroke, and hypertension.      In , a Cardiolite showed a very small, extremely mild reversible defect in the distal anterior wall.  She was not having angina, and Dr. Sanon felt this was an extremely low risk result and recommended medical therapy.     In 2018 she was seen in our [facility] for a myriad of complaints, which were predominantly fatigue and lightheadedness.  It was noted that her medication list was extensive, that she had significant diabetic dysautonomia, and significant chronic anemia from MDS.  It was not felt that she had an acute cardiac issue.  An echo at that time was WNL for age.      She saw Dr. Headley yesterday, states she received \"my shot and I feel so much better, the dizziness is gone\".  She denies chest pain/chest pressure.  She denies any SOA, she cannot feel her heart racing or skipping.  She denies palpitations. She denies having any LE edema and \"has been keeping a close look out for " "that\".  She has AARON and has a Cpap but does not use it.  She does use 2L oxygen at night.  She does not feel she needs any supplemental oxygen during the day.  She states she has trouble swallowing her atorvastatin pill at times.  She states she has had a swallow study and is to take her medications with applesauce.  Even with that, she still has some problems with swallowing that particular pill.  She has a dry cough that is not new.  She denies any fevers.  She has been staying home and has only gotten out once, to go to see Dr. Headley yesterday, in the last 4 weeks. She states her blood pressure has been stable and continues to take the midodrine TID.  She has not fallen, denies syncope or presyncopal episodes. She denies any dark or tarry stools.  She is on Eliquis for DVT and denies any unexplained bleeding.     Past Medical History:   Diagnosis Date   • Allergic rhinitis    • Anemia    • Anxiety    • Appetite absent    • Arthritis    • Asthma    • Back pain    • Bell's palsy    • Black tarry stools    • Blood in stool    • Chronic fatigue    • CKD (chronic kidney disease) stage 3, GFR 30-59 ml/min (CMS/[facility])    • Community acquired pneumonia of left lung 10/25/2018   • Cough    • Depression    • Diabetes mellitus (CMS/[facility])     LAST A1C 6   • Diabetic gastroparesis (CMS/[facility]) 2/19/2016   • Difficulty walking    • Excessive urination at night    • Frequent urination    • GERD (gastroesophageal reflux disease)    • GI bleed    • Gout    • H/O blood clots     LEFT LEG 7 OR 8 YEARS AGO   • Heat intolerance    • History of fall 10/2018   • History of prior pregnancies     x8, miscarriage 5   • History of transfusion 11/2018    due to anemia   • Hyperlipidemia    • Hypertension    • Hypothyroidism    • Normal coronary arteries     by cath 2013   • Orthostatic hypotension    • AARON (obstructive sleep apnea)     DOESNT WEAR REGULARLY   • PONV (postoperative nausea and vomiting)    • Skin cancer    • Stroke (CMS/[facility])     " Several mini-strokes   • TIA (transient ischemic attack)     LAST TIA JULY 2017   • Urination pain    • UTI (urinary tract infection)     Dec 2018 and Jan 2019       Past Surgical History:   Procedure Laterality Date   • BACK SURGERY      HARDWARE   • CHOLECYSTECTOMY      OPEN   • COLONOSCOPY  2011    due for repeat in 2021   • COLONOSCOPY N/A 10/28/2018    Procedure: COLONOSCOPY;  Surgeon: Emmanuel Rogers MD;  Location: [facility] OR;  Service: Gastroenterology   • ENDOSCOPY N/A 10/26/2018    Procedure: ESOPHAGOGASTRODUODENOSCOPY;  Surgeon: Emmanuel Rogers MD;  Location: [facility] OR;  Service: Gastroenterology   • HYSTERECTOMY      PARTIAL    • KNEE SURGERY     • NECK SURGERY     • SPINE SURGERY     • TUMOR REMOVAL Left     Leg   • UPPER GASTROINTESTINAL ENDOSCOPY  2014    gastritis.  done by dr. hastings       Social History     Socioeconomic History   • Marital status:      Spouse name: Not on file   • Number of children: 3   • Years of education: High School   • Highest education level: Not on file   Occupational History     Employer: RETIRED   Tobacco Use   • Smoking status: Never Smoker   • Smokeless tobacco: Never Used   • Tobacco comment: CAFFEINE USE: NONE   Substance and Sexual Activity   • Alcohol use: No   • Drug use: No   • Sexual activity: Defer     Comment: EXERCISE - RARELY       Family History   Problem Relation Age of Onset   • Lupus Mother    • Heart failure Mother 59   • Heart disease Other    • Hypertension Other    • Heart attack Father    • Breast cancer Neg Hx        The following portion of the patient's history were reviewed and updated as appropriate: past medical history, past surgical history, past social history, past family history, allergies, current medications, and problem list.    Review of Systems   Constitution: Positive for malaise/fatigue. Negative for diaphoresis and fever.   HENT: Negative for congestion, hearing loss, hoarse voice, nosebleeds and sore  "throat.    Eyes: Negative for photophobia, vision loss in left eye, vision loss in right eye and visual disturbance.   Cardiovascular: Positive for leg swelling (ocassionally). Negative for chest pain, dyspnea on exertion, irregular heartbeat, near-syncope, orthopnea, palpitations, paroxysmal nocturnal dyspnea and syncope.   Respiratory: Positive for cough (unchanged). Negative for hemoptysis, shortness of breath, sleep disturbances due to breathing, snoring, sputum production and wheezing.    Endocrine: Negative for cold intolerance, heat intolerance, polydipsia, polyphagia and polyuria.   Hematologic/Lymphatic: Negative for bleeding problem. Does not bruise/bleed easily.   Skin: Negative for color change, dry skin, poor wound healing, rash and suspicious lesions.   Musculoskeletal: Negative for arthritis, back pain, falls, gout, joint pain, joint swelling, muscle cramps, muscle weakness and myalgias.   Gastrointestinal: Negative for bloating, abdominal pain, constipation, diarrhea, dysphagia, melena, nausea and vomiting.   Neurological: Positive for dizziness (better since \"receiving my shot\"). Negative for excessive daytime sleepiness, headaches, light-headedness, loss of balance, numbness, paresthesias, seizures, vertigo and weakness.   Psychiatric/Behavioral: Negative for depression, memory loss and substance abuse. The patient is not nervous/anxious.        Allergies   Allergen Reactions   • Baclofen Anxiety     Panic attack, nightmares   • Eliquis [Apixaban] GI Bleeding   • Codeine Itching and Rash   • Lisinopril Cough   • Morphine Hives   • Penicillins Rash     Tolerates cephalosporins          Current Outpatient Medications:   •  ACCU-CHEK FASTCLIX LANCETS misc, TEST 3-4 TIMES DAILY AS DIRECTED, Disp: 400 each, Rfl: 3  •  ACCU-CHEK SMARTVIEW test strip, TEST BLOOD SUGAR THREE TIMES DAILY OR AS DIRECTED, Disp: 300 each, Rfl: 3  •  acetaminophen (TYLENOL) 325 MG tablet, Take 2 tablets by mouth Every 6 (Six) " Hours As Needed for Mild Pain . OTC product, Disp: 40 tablet, Rfl: 0  •  albuterol sulfate  (90 Base) MCG/ACT inhaler, Inhale 2 puffs Every 4 (Four) Hours As Needed for Wheezing., Disp: 1 inhaler, Rfl: 3  •  atorvastatin (LIPITOR) 10 MG tablet, TAKE ONE TABLET BY MOUTH AT BEDTIME, Disp: 90 tablet, Rfl: 1  •  benzonatate (TESSALON) 200 MG capsule, Take 1 capsule by mouth 3 (Three) Times a Day As Needed for Cough., Disp: 20 capsule, Rfl: 2  •  Blood Glucose Monitoring Suppl (ACCU-CHEK KENNETH SMARTVIEW) w/Device kit, TEST blood sugar three times daily or as directed, Disp: 1 kit, Rfl: 0  •  cholecalciferol 2000 units tablet, Take 2,000 Units by mouth Daily., Disp: , Rfl:   •  Continuous Blood Gluc  (FREESTYLE MATILDA 14 DAY READER) device, 1 each 6 (Six) Times a Day., Disp: 1 Device, Rfl: 0  •  Continuous Blood Gluc Sensor (FREESTYLE MATILDA 14 DAY SENSOR) misc, 1 each 6 (Six) Times a Day., Disp: 2 each, Rfl: 11  •  cyclobenzaprine (FLEXERIL) 10 MG tablet, TAKE ONE TABLET BY MOUTH TWICE DAILY as needed for muscle spasms, Disp: 30 tablet, Rfl: 0  •  desvenlafaxine (PRISTIQ) 50 MG 24 hr tablet, TAKE ONE TABLET BY MOUTH EVERY DAY, Disp: 30 tablet, Rfl: 0  •  diphenhydrAMINE (BENADRYL) 25 mg capsule, Take 25 mg by mouth Every 6 (Six) Hours As Needed for Itching., Disp: , Rfl:   •  ELIQUIS 5 MG tablet tablet, TAKE ONE TABLET BY MOUTH TWICE DAILY, Disp: 60 tablet, Rfl: 0  •  epoetin chu 79238 UNIT/ML solution 20,000 Units, epoetin chu 18314 UNIT/ML solution 40,000 Units, Inject 60,000 Units under the skin into the appropriate area as directed 1 (One) Time Per Week., Disp: , Rfl:   •  furosemide (LASIX) 20 MG tablet, TAKE ONE (1) TABLET ORALLY (BY MOUTH) ONCE DAILY, Disp: 90 tablet, Rfl: 1  •  gabapentin (NEURONTIN) 400 MG capsule, TAKE ONE CAPSULE BY MOUTH EVERY MORNING, AT NOON, AND TWO AT BEDTIME, Disp: 120 capsule, Rfl: 0  •  HYDROcodone-acetaminophen (NORCO) 5-325 MG per tablet, Take 1 tablet by mouth Every 6  "(Six) Hours As Needed for Moderate Pain ., Disp: 60 tablet, Rfl: 0  •  insulin aspart (novoLOG) 100 UNIT/ML injection, Inject 5 Units under the skin into the appropriate area as directed 3 (Three) Times a Day With Meals., Disp: , Rfl: 12  •  Insulin Degludec (TRESIBA FLEXTOUCH) 200 UNIT/ML solution pen-injector, Inject 54 Units under the skin into the appropriate area as directed every night at bedtime., Disp: 9 mL, Rfl: 11  •  levothyroxine (SYNTHROID, LEVOTHROID) 88 MCG tablet, TAKE ONE TABLET BY MOUTH EVERY DAY, Disp: 90 tablet, Rfl: 1  •  midodrine (PROAMATINE) 2.5 MG tablet, TAKE ONE TABLET BY MOUTH THREE TIMES DAILY, Disp: 90 tablet, Rfl: 0  •  Multiple Vitamins-Minerals (CENTRUM SILVER PO), Take  by mouth Daily., Disp: , Rfl:   •  Needle, Disp, (BD DISP NEEDLES) 30G X 1/2\" misc, To be used 3 times daily with Novolog Flexpen., Disp: 100 each, Rfl: 5  •  nystatin (MYCOSTATIN) 403359 UNIT/GM powder, Apply  topically to the appropriate area as directed 2 (Two) Times a Day., Disp: 60 g, Rfl: 2  •  O2 (OXYGEN), Inhale 2 L/min Every Night. Uses 2L  overnight only, Disp: , Rfl:   •  omeprazole (priLOSEC) 40 MG capsule, TAKE ONE CAPSULE BY MOUTH EVERY DAY, Disp: 90 capsule, Rfl: 1  •  ondansetron (ZOFRAN) 8 MG tablet, Take 1 tablet by mouth Every 8 (Eight) Hours As Needed for Nausea or Vomiting., Disp: 15 tablet, Rfl: 0  •  polyethylene glycol (MIRALAX) pack packet, Take 17 g by mouth Daily., Disp: , Rfl:   •  potassium chloride (K-DUR) 10 MEQ CR tablet, TAKE ONE TABLET BY MOUTH EVERY DAY, Disp: 90 tablet, Rfl: 2  •  promethazine (PHENERGAN) 12.5 MG tablet, TABLET ONE-HALF TABLET TO ONE TABLET BY MOUTH EVERY 6 HOURS AS NEEDED FOR NAUSEA, Disp: 20 tablet, Rfl: 0  •  ULTICARE MINI PEN NEEDLES 31G X 6 MM misc, USE TO INJECT INSULINS 4 TIMES DAILY AS DIRECTED, Disp: 400 each, Rfl: 3  No current facility-administered medications for this visit.         Objective:     There were no vitals filed for this visit.  There is no " "height or weight on file to calculate BMI.      Physical Exam  Telephone visit      Procedures      Assessment:       Diagnosis Plan   1. Chronic diastolic CHF (congestive heart failure) (CMS/[facility])     2. Deep vein thrombosis (DVT) of other vein of lower extremity, unspecified chronicity, unspecified laterality (CMS/[facility])     3. Mixed hyperlipidemia     4. Essential hypertension     5. Orthostatic hypotension     6. Obstructive sleep apnea syndrome     7. Gastroesophageal reflux disease, esophagitis presence not specified     8. Monoclonal gammopathy of unknown significance ([facility]US)            Plan:        1.  Chronic diastolic CHF - denies SOA and LE edema.  States \"doing well in that area\".  She takes low dose lasix daily.    2.  DVT - on Eliquis, denies any unexplained bleeding.    3.  Mixed hyperlipidemia - continue lipid lowering therapy.  I have asked her to talk with  about trouble swallowing that particular tablet.     4.  HTN - controlled.    5.  Orthostatic HTN - controlled with midodrine TID.  States it has not been a problem in \"quite some time\".    6.  AARON - concompliant with Cpap but uses 2 L 02 at night    7.  GERD     8.  Monoclonal gammopathy - follows with Dr. Headley.    RTO in 6 months with JK    As always, it has been a pleasure to participate in your patient's care. Thank you.       Sincerely,       REY Castro      Current Outpatient Medications:   •  ACCU-CHEK FASTCLIX LANCETS misc, TEST 3-4 TIMES DAILY AS DIRECTED, Disp: 400 each, Rfl: 3  •  ACCU-CHEK SMARTVIEW test strip, TEST BLOOD SUGAR THREE TIMES DAILY OR AS DIRECTED, Disp: 300 each, Rfl: 3  •  acetaminophen (TYLENOL) 325 MG tablet, Take 2 tablets by mouth Every 6 (Six) Hours As Needed for Mild Pain . OTC product, Disp: 40 tablet, Rfl: 0  •  albuterol sulfate  (90 Base) MCG/ACT inhaler, Inhale 2 puffs Every 4 (Four) Hours As Needed for Wheezing., Disp: 1 inhaler, Rfl: 3  •  atorvastatin (LIPITOR) 10 MG tablet, TAKE ONE " TABLET BY MOUTH AT BEDTIME, Disp: 90 tablet, Rfl: 1  •  benzonatate (TESSALON) 200 MG capsule, Take 1 capsule by mouth 3 (Three) Times a Day As Needed for Cough., Disp: 20 capsule, Rfl: 2  •  Blood Glucose Monitoring Suppl (ACCU-CHEK KENNETH SMARTVIEW) w/Device kit, TEST blood sugar three times daily or as directed, Disp: 1 kit, Rfl: 0  •  cholecalciferol 2000 units tablet, Take 2,000 Units by mouth Daily., Disp: , Rfl:   •  Continuous Blood Gluc  (FREESTYLE MATILDA 14 DAY READER) device, 1 each 6 (Six) Times a Day., Disp: 1 Device, Rfl: 0  •  Continuous Blood Gluc Sensor (FREESTYLE MATILDA 14 DAY SENSOR) misc, 1 each 6 (Six) Times a Day., Disp: 2 each, Rfl: 11  •  cyclobenzaprine (FLEXERIL) 10 MG tablet, TAKE ONE TABLET BY MOUTH TWICE DAILY as needed for muscle spasms, Disp: 30 tablet, Rfl: 0  •  desvenlafaxine (PRISTIQ) 50 MG 24 hr tablet, TAKE ONE TABLET BY MOUTH EVERY DAY, Disp: 30 tablet, Rfl: 0  •  diphenhydrAMINE (BENADRYL) 25 mg capsule, Take 25 mg by mouth Every 6 (Six) Hours As Needed for Itching., Disp: , Rfl:   •  ELIQUIS 5 MG tablet tablet, TAKE ONE TABLET BY MOUTH TWICE DAILY, Disp: 60 tablet, Rfl: 0  •  epoetin chu 42572 UNIT/ML solution 20,000 Units, epoetin chu 62253 UNIT/ML solution 40,000 Units, Inject 60,000 Units under the skin into the appropriate area as directed 1 (One) Time Per Week., Disp: , Rfl:   •  furosemide (LASIX) 20 MG tablet, TAKE ONE (1) TABLET ORALLY (BY MOUTH) ONCE DAILY, Disp: 90 tablet, Rfl: 1  •  gabapentin (NEURONTIN) 400 MG capsule, TAKE ONE CAPSULE BY MOUTH EVERY MORNING, AT NOON, AND TWO AT BEDTIME, Disp: 120 capsule, Rfl: 0  •  HYDROcodone-acetaminophen (NORCO) 5-325 MG per tablet, Take 1 tablet by mouth Every 6 (Six) Hours As Needed for Moderate Pain ., Disp: 60 tablet, Rfl: 0  •  insulin aspart (novoLOG) 100 UNIT/ML injection, Inject 5 Units under the skin into the appropriate area as directed 3 (Three) Times a Day With Meals., Disp: , Rfl: 12  •  Insulin Degludec  "(TRESIBA FLEXTOUCH) 200 UNIT/ML solution pen-injector, Inject 54 Units under the skin into the appropriate area as directed every night at bedtime., Disp: 9 mL, Rfl: 11  •  levothyroxine (SYNTHROID, LEVOTHROID) 88 MCG tablet, TAKE ONE TABLET BY MOUTH EVERY DAY, Disp: 90 tablet, Rfl: 1  •  midodrine (PROAMATINE) 2.5 MG tablet, TAKE ONE TABLET BY MOUTH THREE TIMES DAILY, Disp: 90 tablet, Rfl: 0  •  Multiple Vitamins-Minerals (CENTRUM SILVER PO), Take  by mouth Daily., Disp: , Rfl:   •  Needle, Disp, (BD DISP NEEDLES) 30G X 1/2\" misc, To be used 3 times daily with Novolog Flexpen., Disp: 100 each, Rfl: 5  •  nystatin (MYCOSTATIN) 779574 UNIT/GM powder, Apply  topically to the appropriate area as directed 2 (Two) Times a Day., Disp: 60 g, Rfl: 2  •  O2 (OXYGEN), Inhale 2 L/min Every Night. Uses 2L  overnight only, Disp: , Rfl:   •  omeprazole (priLOSEC) 40 MG capsule, TAKE ONE CAPSULE BY MOUTH EVERY DAY, Disp: 90 capsule, Rfl: 1  •  ondansetron (ZOFRAN) 8 MG tablet, Take 1 tablet by mouth Every 8 (Eight) Hours As Needed for Nausea or Vomiting., Disp: 15 tablet, Rfl: 0  •  polyethylene glycol (MIRALAX) pack packet, Take 17 g by mouth Daily., Disp: , Rfl:   •  potassium chloride (K-DUR) 10 MEQ CR tablet, TAKE ONE TABLET BY MOUTH EVERY DAY, Disp: 90 tablet, Rfl: 2  •  promethazine (PHENERGAN) 12.5 MG tablet, TABLET ONE-HALF TABLET TO ONE TABLET BY MOUTH EVERY 6 HOURS AS NEEDED FOR NAUSEA, Disp: 20 tablet, Rfl: 0  •  ULTICARE MINI PEN NEEDLES 31G X 6 MM misc, USE TO INJECT INSULINS 4 TIMES DAILY AS DIRECTED, Disp: 400 each, Rfl: 3  No current facility-administered medications for this visit.     Dictated utilizing Dragon dictation        "

## 2020-04-10 LAB
IGA SERPL-MCNC: 482 MG/DL (ref 64–422)
IGG SERPL-MCNC: 1022 MG/DL (ref 586–1602)
IGM SERPL-MCNC: 155 MG/DL (ref 26–217)
PROT PATTERN SERPL IFE-IMP: ABNORMAL

## 2020-04-15 ENCOUNTER — INFUSION (OUTPATIENT)
Dept: ONCOLOGY | Facility: HOSPITAL | Age: 80
End: 2020-04-15

## 2020-04-15 ENCOUNTER — LAB (OUTPATIENT)
Dept: LAB | Facility: HOSPITAL | Age: 80
End: 2020-04-15

## 2020-04-15 VITALS
TEMPERATURE: 98.7 F | HEART RATE: 81 BPM | OXYGEN SATURATION: 92 % | SYSTOLIC BLOOD PRESSURE: 103 MMHG | DIASTOLIC BLOOD PRESSURE: 65 MMHG

## 2020-04-15 DIAGNOSIS — D64.9 ANEMIA REQUIRING TRANSFUSIONS: Primary | ICD-10-CM

## 2020-04-15 DIAGNOSIS — D64.9 ANEMIA REQUIRING TRANSFUSIONS: ICD-10-CM

## 2020-04-15 DIAGNOSIS — E11.22 CKD STAGE 3 DUE TO TYPE 2 DIABETES MELLITUS (HCC): ICD-10-CM

## 2020-04-15 DIAGNOSIS — N18.30 CKD STAGE 3 DUE TO TYPE 2 DIABETES MELLITUS (HCC): ICD-10-CM

## 2020-04-15 DIAGNOSIS — D46.C MYELODYSPLASTIC SYNDROME WITH 5Q DELETION (HCC): ICD-10-CM

## 2020-04-15 LAB
BASOPHILS # BLD AUTO: 0.09 10*3/MM3 (ref 0–0.2)
BASOPHILS NFR BLD AUTO: 1.6 % (ref 0–1.5)
DEPRECATED RDW RBC AUTO: 84.6 FL (ref 37–54)
EOSINOPHIL # BLD AUTO: 0.31 10*3/MM3 (ref 0–0.4)
EOSINOPHIL NFR BLD AUTO: 5.4 % (ref 0.3–6.2)
ERYTHROCYTE [DISTWIDTH] IN BLOOD BY AUTOMATED COUNT: 24.6 % (ref 12.3–15.4)
HCT VFR BLD AUTO: 25.7 % (ref 34–46.6)
HGB BLD-MCNC: 8.2 G/DL (ref 12–15.9)
IMM GRANULOCYTES # BLD AUTO: 0.02 10*3/MM3 (ref 0–0.05)
IMM GRANULOCYTES NFR BLD AUTO: 0.3 % (ref 0–0.5)
LYMPHOCYTES # BLD AUTO: 2.12 10*3/MM3 (ref 0.7–3.1)
LYMPHOCYTES NFR BLD AUTO: 37 % (ref 19.6–45.3)
MCH RBC QN AUTO: 31.7 PG (ref 26.6–33)
MCHC RBC AUTO-ENTMCNC: 31.9 G/DL (ref 31.5–35.7)
MCV RBC AUTO: 99.2 FL (ref 79–97)
MONOCYTES # BLD AUTO: 0.23 10*3/MM3 (ref 0.1–0.9)
MONOCYTES NFR BLD AUTO: 4 % (ref 5–12)
NEUTROPHILS # BLD AUTO: 2.96 10*3/MM3 (ref 1.7–7)
NEUTROPHILS NFR BLD AUTO: 51.7 % (ref 42.7–76)
PLATELET # BLD AUTO: 410 10*3/MM3 (ref 140–450)
PMV BLD AUTO: 11.3 FL (ref 6–12)
RBC # BLD AUTO: 2.59 10*6/MM3 (ref 3.77–5.28)
WBC NRBC COR # BLD: 5.73 10*3/MM3 (ref 3.4–10.8)

## 2020-04-15 PROCEDURE — 25010000002 EPOETIN ALFA PER 1000 UNITS: Performed by: INTERNAL MEDICINE

## 2020-04-15 PROCEDURE — 85025 COMPLETE CBC W/AUTO DIFF WBC: CPT | Performed by: INTERNAL MEDICINE

## 2020-04-15 PROCEDURE — 96372 THER/PROPH/DIAG INJ SC/IM: CPT

## 2020-04-15 PROCEDURE — 36415 COLL VENOUS BLD VENIPUNCTURE: CPT

## 2020-04-15 RX ADMIN — ERYTHROPOIETIN 60000 UNITS: 40000 INJECTION, SOLUTION INTRAVENOUS; SUBCUTANEOUS at 10:46

## 2020-04-22 ENCOUNTER — OFFICE VISIT (OUTPATIENT)
Dept: ONCOLOGY | Facility: CLINIC | Age: 80
End: 2020-04-22

## 2020-04-22 ENCOUNTER — LAB (OUTPATIENT)
Dept: LAB | Facility: HOSPITAL | Age: 80
End: 2020-04-22

## 2020-04-22 ENCOUNTER — HOSPITAL ENCOUNTER (OUTPATIENT)
Dept: INFUSION THERAPY | Facility: HOSPITAL | Age: 80
Discharge: HOME | End: 2020-04-22
Admitting: INTERNAL MEDICINE

## 2020-04-22 ENCOUNTER — INFUSION (OUTPATIENT)
Dept: ONCOLOGY | Facility: HOSPITAL | Age: 80
End: 2020-04-22

## 2020-04-22 VITALS
HEIGHT: 62 IN | DIASTOLIC BLOOD PRESSURE: 70 MMHG | OXYGEN SATURATION: 99 % | SYSTOLIC BLOOD PRESSURE: 131 MMHG | HEART RATE: 87 BPM | BODY MASS INDEX: 37.12 KG/M2 | RESPIRATION RATE: 16 BRPM | TEMPERATURE: 97.8 F

## 2020-04-22 VITALS
RESPIRATION RATE: 16 BRPM | DIASTOLIC BLOOD PRESSURE: 63 MMHG | OXYGEN SATURATION: 98 % | SYSTOLIC BLOOD PRESSURE: 137 MMHG | TEMPERATURE: 98.1 F | HEART RATE: 70 BPM

## 2020-04-22 DIAGNOSIS — D47.2 MONOCLONAL GAMMOPATHY OF UNKNOWN SIGNIFICANCE (MGUS): ICD-10-CM

## 2020-04-22 DIAGNOSIS — D46.C MYELODYSPLASTIC SYNDROME WITH 5Q DELETION (HCC): ICD-10-CM

## 2020-04-22 DIAGNOSIS — D64.9 ANEMIA REQUIRING TRANSFUSIONS: ICD-10-CM

## 2020-04-22 DIAGNOSIS — E83.111 IRON OVERLOAD, TRANSFUSIONAL: ICD-10-CM

## 2020-04-22 DIAGNOSIS — D64.9 CHRONIC ANEMIA: ICD-10-CM

## 2020-04-22 DIAGNOSIS — D64.9 ANEMIA REQUIRING TRANSFUSIONS: Primary | ICD-10-CM

## 2020-04-22 DIAGNOSIS — D64.9 CHRONIC ANEMIA: Primary | ICD-10-CM

## 2020-04-22 LAB
ABO GROUP BLD: NORMAL
BASOPHILS # BLD AUTO: 0.13 10*3/MM3 (ref 0–0.2)
BASOPHILS NFR BLD AUTO: 2.2 % (ref 0–1.5)
BLD GP AB SCN SERPL QL: NEGATIVE
DEPRECATED RDW RBC AUTO: 89.5 FL (ref 37–54)
EOSINOPHIL # BLD AUTO: 0.27 10*3/MM3 (ref 0–0.4)
EOSINOPHIL NFR BLD AUTO: 4.5 % (ref 0.3–6.2)
ERYTHROCYTE [DISTWIDTH] IN BLOOD BY AUTOMATED COUNT: 26.1 % (ref 12.3–15.4)
HCT VFR BLD AUTO: 24.1 % (ref 34–46.6)
HGB BLD-MCNC: 7.4 G/DL (ref 12–15.9)
IMM GRANULOCYTES # BLD AUTO: 0.05 10*3/MM3 (ref 0–0.05)
IMM GRANULOCYTES NFR BLD AUTO: 0.8 % (ref 0–0.5)
LYMPHOCYTES # BLD AUTO: 1.98 10*3/MM3 (ref 0.7–3.1)
LYMPHOCYTES NFR BLD AUTO: 33.2 % (ref 19.6–45.3)
MCH RBC QN AUTO: 31.4 PG (ref 26.6–33)
MCHC RBC AUTO-ENTMCNC: 30.7 G/DL (ref 31.5–35.7)
MCV RBC AUTO: 102.1 FL (ref 79–97)
MONOCYTES # BLD AUTO: 0.23 10*3/MM3 (ref 0.1–0.9)
MONOCYTES NFR BLD AUTO: 3.9 % (ref 5–12)
NEUTROPHILS # BLD AUTO: 3.31 10*3/MM3 (ref 1.7–7)
NEUTROPHILS NFR BLD AUTO: 55.4 % (ref 42.7–76)
PLATELET # BLD AUTO: 386 10*3/MM3 (ref 140–450)
PMV BLD AUTO: 11.2 FL (ref 6–12)
RBC # BLD AUTO: 2.36 10*6/MM3 (ref 3.77–5.28)
RH BLD: POSITIVE
T&S EXPIRATION DATE: NORMAL
WBC NRBC COR # BLD: 5.97 10*3/MM3 (ref 3.4–10.8)

## 2020-04-22 PROCEDURE — 36415 COLL VENOUS BLD VENIPUNCTURE: CPT

## 2020-04-22 PROCEDURE — 25010000002 FUROSEMIDE PER 20 MG: Performed by: INTERNAL MEDICINE

## 2020-04-22 PROCEDURE — 36430 TRANSFUSION BLD/BLD COMPNT: CPT

## 2020-04-22 PROCEDURE — 86900 BLOOD TYPING SEROLOGIC ABO: CPT

## 2020-04-22 PROCEDURE — A9270 NON-COVERED ITEM OR SERVICE: HCPCS | Performed by: INTERNAL MEDICINE

## 2020-04-22 PROCEDURE — 86901 BLOOD TYPING SEROLOGIC RH(D): CPT | Performed by: INTERNAL MEDICINE

## 2020-04-22 PROCEDURE — 86900 BLOOD TYPING SEROLOGIC ABO: CPT | Performed by: INTERNAL MEDICINE

## 2020-04-22 PROCEDURE — 63710000001 ACETAMINOPHEN 325 MG TABLET: Performed by: INTERNAL MEDICINE

## 2020-04-22 PROCEDURE — 96374 THER/PROPH/DIAG INJ IV PUSH: CPT

## 2020-04-22 PROCEDURE — 86923 COMPATIBILITY TEST ELECTRIC: CPT

## 2020-04-22 PROCEDURE — 86850 RBC ANTIBODY SCREEN: CPT | Performed by: INTERNAL MEDICINE

## 2020-04-22 PROCEDURE — P9016 RBC LEUKOCYTES REDUCED: HCPCS

## 2020-04-22 PROCEDURE — 99214 OFFICE O/P EST MOD 30 MIN: CPT | Performed by: INTERNAL MEDICINE

## 2020-04-22 PROCEDURE — 63710000001 DIPHENHYDRAMINE PER 50 MG: Performed by: INTERNAL MEDICINE

## 2020-04-22 PROCEDURE — 85025 COMPLETE CBC W/AUTO DIFF WBC: CPT | Performed by: INTERNAL MEDICINE

## 2020-04-22 RX ORDER — SODIUM CHLORIDE 9 MG/ML
250 INJECTION, SOLUTION INTRAVENOUS AS NEEDED
Status: CANCELLED | OUTPATIENT
Start: 2020-04-22

## 2020-04-22 RX ORDER — ACETAMINOPHEN 325 MG/1
650 TABLET ORAL ONCE
Status: CANCELLED | OUTPATIENT
Start: 2020-04-22 | End: 2020-04-22

## 2020-04-22 RX ORDER — FUROSEMIDE 10 MG/ML
20 INJECTION INTRAMUSCULAR; INTRAVENOUS ONCE
Status: CANCELLED | OUTPATIENT
Start: 2020-04-22 | End: 2020-04-22

## 2020-04-22 RX ORDER — SODIUM CHLORIDE 9 MG/ML
250 INJECTION, SOLUTION INTRAVENOUS AS NEEDED
Status: DISCONTINUED | OUTPATIENT
Start: 2020-04-22 | End: 2020-04-24 | Stop reason: HOSPADM

## 2020-04-22 RX ORDER — FUROSEMIDE 10 MG/ML
20 INJECTION INTRAMUSCULAR; INTRAVENOUS ONCE
Status: COMPLETED | OUTPATIENT
Start: 2020-04-22 | End: 2020-04-22

## 2020-04-22 RX ORDER — DIPHENHYDRAMINE HCL 25 MG
25 CAPSULE ORAL ONCE
Status: COMPLETED | OUTPATIENT
Start: 2020-04-22 | End: 2020-04-22

## 2020-04-22 RX ORDER — ACETAMINOPHEN 325 MG/1
650 TABLET ORAL ONCE
Status: COMPLETED | OUTPATIENT
Start: 2020-04-22 | End: 2020-04-22

## 2020-04-22 RX ADMIN — SODIUM CHLORIDE 250 ML: 9 INJECTION, SOLUTION INTRAVENOUS at 13:54

## 2020-04-22 RX ADMIN — FUROSEMIDE 20 MG: 10 INJECTION, SOLUTION INTRAMUSCULAR; INTRAVENOUS at 14:01

## 2020-04-22 RX ADMIN — ACETAMINOPHEN 650 MG: 325 TABLET, FILM COATED ORAL at 11:21

## 2020-04-22 RX ADMIN — DIPHENHYDRAMINE HYDROCHLORIDE 25 MG: 25 CAPSULE ORAL at 13:20

## 2020-04-22 NOTE — NURSING NOTE
NURSING PROGRESS NOTE      1105 Pt to [facility] per  scheduled appointment.  Pt ID verified by (2) identifiers. Allergies, medications and medical history reviewed and verified. Consent signed for blood transfusion . Kezia Benítez RN

## 2020-04-22 NOTE — PROGRESS NOTES
Subjective     REASON FOR FOLLOW-UP:   1.  Macrocytic anemia secondary to myelodysplastic syndrome with 5 q. minus and chronic kidney disease  2.  Monoclonal gammopathy of undetermined significance                             REQUESTING PHYSICIAN:  Dr. Baugh      History of Present Illness   Ms. Singh is a nikita 80-year-old woman returning today for follow-up of her myelodysplastic syndrome with anemia.  She comes in today with significant fatigue and increasing.  She denies shortness of breath or chest pain.      Hematology History:  Patient presented as 78-year-old woman for evaluation of anemia.  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  She has stage III chronic kidney disease followed by Dr. Garza of nephrology.  Reviewing her records, the patient has had anemia present since at least 2014 but her hemoglobin has worsened over the past 6 months.  The patient was admitted to the hospital in October 2018 with symptomatic anemia, shortness of breath and lightheadedness.  She was found to have hemoglobin of 7.0.  There was concern for GI blood loss although EGD and colonoscopy performed showed no obvious etiology of blood loss.  The patient states that she was transfused 7 units of packed red blood cells during the hospital stay.  I do not see any Hemoccult results.  She was previously on Eliquis which was discontinued.  Since discharge in October, the patient has been receiving weekly Procrit 20,000 units in the ACU at [facility], but despite Procrit she has required transfusion on 2 separate occasions.  She is not seeing any bright red blood per rectum or melena.  She complains of fatigue and lightheadedness.    Recent iron profile on 1/17/19 was inconsistent with iron deficiency with an iron saturation 68% and the ferritin was 352.  The red blood cells are macrocytic.  White blood cell and platelet counts have been normal.    The  patient was seen in hematology on 1/29/19 and additional evaluation performed.  The reticulocyte count was elevated 3.72% but the haptoglobin and LDH were both normal arguing against a hemolytic process.  B12 and folic acid levels were normal.  Serum protein electrophoresis showed no M spike but the immunofixation identified a small IgA monoclonal protein with lambda specificity; IgA level 544.  Sedimentation rate elevated 58.  A free light chain ratio from the serum was normal 1.64.    The patient was referred for a bone marrow exam performed on 3/6/2019 which showed a cellularity of 35%.  There was erythroid hyperplasia with megaloblastoid changes, normal myeloid maturation, increased megakaryocytes with frequent micro megakaryocytes, scattered lymphoid aggregates.  There were morphologically normal plasma cells increased in #2 8% of the cellularity.  There were no increased blasts.  No increase in iron stores.  Karyotype showed a deletion 5 q. and 19 of 20 cells analyzed.        Past Medical History:   Diagnosis Date   • Allergic rhinitis    • Anemia    • Anxiety    • Appetite absent    • Arthritis    • Asthma    • Back pain    • Bell's palsy    • Black tarry stools    • Blood in stool    • Chronic fatigue    • CKD (chronic kidney disease) stage 3, GFR 30-59 ml/min (CMS/HCC)    • Community acquired pneumonia of left lung 10/25/2018   • Cough    • Depression    • Diabetes mellitus (CMS/[facility])     LAST A1C 6   • Diabetic gastroparesis (CMS/HCC) 2/19/2016   • Difficulty walking    • Excessive urination at night    • Frequent urination    • GERD (gastroesophageal reflux disease)    • GI bleed    • Gout    • H/O blood clots     LEFT LEG 7 OR 8 YEARS AGO   • Heat intolerance    • History of fall 10/2018   • History of prior pregnancies     x8, miscarriage 5   • History of transfusion 11/2018    due to anemia   • Hyperlipidemia    • Hypertension    • Hypothyroidism    • Normal coronary arteries     by cath 2013   •  Orthostatic hypotension    • AARON (obstructive sleep apnea)     DOESNT WEAR REGULARLY   • PONV (postoperative nausea and vomiting)    • Skin cancer    • Stroke (CMS/HCC)     Several mini-strokes   • TIA (transient ischemic attack)     LAST TIA JULY 2017   • Urination pain    • UTI (urinary tract infection)     Dec 2018 and Jan 2019        Past Surgical History:   Procedure Laterality Date   • BACK SURGERY      HARDWARE   • CHOLECYSTECTOMY      OPEN   • COLONOSCOPY  2011    due for repeat in 2021   • COLONOSCOPY N/A 10/28/2018    Procedure: COLONOSCOPY;  Surgeon: Emmanuel Rogers MD;  Location: [facility] OR;  Service: Gastroenterology   • ENDOSCOPY N/A 10/26/2018    Procedure: ESOPHAGOGASTRODUODENOSCOPY;  Surgeon: Emmanuel Rogers MD;  Location: [facility] OR;  Service: Gastroenterology   • HYSTERECTOMY      PARTIAL    • KNEE SURGERY     • NECK SURGERY     • SPINE SURGERY     • TUMOR REMOVAL Left     Leg   • UPPER GASTROINTESTINAL ENDOSCOPY  2014    gastritis.  done by dr. hastings        Current Outpatient Medications on File Prior to Visit   Medication Sig Dispense Refill   • ACCU-CHEK FASTCLIX LANCETS misc TEST 3-4 TIMES DAILY AS DIRECTED 400 each 3   • ACCU-CHEK SMARTVIEW test strip TEST BLOOD SUGAR THREE TIMES DAILY OR AS DIRECTED 300 each 3   • acetaminophen (TYLENOL) 325 MG tablet Take 2 tablets by mouth Every 6 (Six) Hours As Needed for Mild Pain . OTC product 40 tablet 0   • albuterol sulfate  (90 Base) MCG/ACT inhaler Inhale 2 puffs Every 4 (Four) Hours As Needed for Wheezing. 1 inhaler 3   • atorvastatin (LIPITOR) 10 MG tablet TAKE ONE TABLET BY MOUTH AT BEDTIME 90 tablet 1   • benzonatate (TESSALON) 200 MG capsule Take 1 capsule by mouth 3 (Three) Times a Day As Needed for Cough. 20 capsule 2   • Blood Glucose Monitoring Suppl (ACCU-CHEK KENNETH SMARTVIEW) w/Device kit TEST blood sugar three times daily or as directed 1 kit 0   • cholecalciferol 2000 units tablet Take 2,000 Units by mouth  "Daily.     • Continuous Blood Gluc  (FREESTYLE MATILDA 14 DAY READER) device 1 each 6 (Six) Times a Day. 1 Device 0   • Continuous Blood Gluc Sensor (FREESTYLE MATILDA 14 DAY SENSOR) misc 1 each 6 (Six) Times a Day. 2 each 11   • cyclobenzaprine (FLEXERIL) 10 MG tablet TAKE ONE TABLET BY MOUTH TWICE DAILY as needed for muscle spasms 30 tablet 0   • desvenlafaxine (PRISTIQ) 50 MG 24 hr tablet TAKE ONE TABLET BY MOUTH EVERY DAY 30 tablet 0   • diphenhydrAMINE (BENADRYL) 25 mg capsule Take 25 mg by mouth Every 6 (Six) Hours As Needed for Itching.     • ELIQUIS 5 MG tablet tablet TAKE ONE TABLET BY MOUTH TWICE DAILY 60 tablet 0   • epoetin chu 98652 UNIT/ML solution 20,000 Units, epoetin chu 86284 UNIT/ML solution 40,000 Units Inject 60,000 Units under the skin into the appropriate area as directed 1 (One) Time Per Week.     • furosemide (LASIX) 20 MG tablet TAKE ONE (1) TABLET ORALLY (BY MOUTH) ONCE DAILY 90 tablet 1   • gabapentin (NEURONTIN) 400 MG capsule TAKE ONE CAPSULE BY MOUTH EVERY MORNING, AT NOON, AND TWO AT BEDTIME 120 capsule 0   • HYDROcodone-acetaminophen (NORCO) 5-325 MG per tablet Take 1 tablet by mouth Every 6 (Six) Hours As Needed for Moderate Pain . 60 tablet 0   • insulin aspart (novoLOG) 100 UNIT/ML injection Inject 5 Units under the skin into the appropriate area as directed 3 (Three) Times a Day With Meals.  12   • Insulin Degludec (TRESIBA FLEXTOUCH) 200 UNIT/ML solution pen-injector Inject 54 Units under the skin into the appropriate area as directed every night at bedtime. 9 mL 11   • levothyroxine (SYNTHROID, LEVOTHROID) 88 MCG tablet TAKE ONE TABLET BY MOUTH EVERY DAY 90 tablet 1   • midodrine (PROAMATINE) 2.5 MG tablet TAKE ONE TABLET BY MOUTH THREE TIMES DAILY 90 tablet 0   • Multiple Vitamins-Minerals (CENTRUM SILVER PO) Take  by mouth Daily.     • Needle, Disp, (BD DISP NEEDLES) 30G X 1/2\" misc To be used 3 times daily with Novolog Flexpen. 100 each 5   • nystatin (MYCOSTATIN) " 332267 UNIT/GM powder Apply  topically to the appropriate area as directed 2 (Two) Times a Day. 60 g 2   • O2 (OXYGEN) Inhale 2 L/min Every Night. Uses 2L  overnight only     • omeprazole (priLOSEC) 40 MG capsule TAKE ONE CAPSULE BY MOUTH EVERY DAY 90 capsule 1   • ondansetron (ZOFRAN) 8 MG tablet Take 1 tablet by mouth Every 8 (Eight) Hours As Needed for Nausea or Vomiting. 15 tablet 0   • polyethylene glycol (MIRALAX) pack packet Take 17 g by mouth Daily.     • potassium chloride (K-DUR) 10 MEQ CR tablet TAKE ONE TABLET BY MOUTH EVERY DAY 90 tablet 2   • promethazine (PHENERGAN) 12.5 MG tablet TABLET ONE-HALF TABLET TO ONE TABLET BY MOUTH EVERY 6 HOURS AS NEEDED FOR NAUSEA 20 tablet 0   • ULTICARE MINI PEN NEEDLES 31G X 6 MM misc USE TO INJECT INSULINS 4 TIMES DAILY AS DIRECTED 400 each 3     No current facility-administered medications on file prior to visit.         ALLERGIES:    Allergies   Allergen Reactions   • Baclofen Anxiety     Panic attack, nightmares   • Eliquis [Apixaban] GI Bleeding   • Codeine Itching and Rash   • Lisinopril Cough   • Morphine Hives   • Penicillins Rash     Tolerates cephalosporins         Social History     Socioeconomic History   • Marital status:      Spouse name: Not on file   • Number of children: 3   • Years of education: High School   • Highest education level: Not on file   Occupational History     Employer: RETIRED   Tobacco Use   • Smoking status: Never Smoker   • Smokeless tobacco: Never Used   • Tobacco comment: CAFFEINE USE: NONE   Substance and Sexual Activity   • Alcohol use: No   • Drug use: No   • Sexual activity: Defer     Comment: EXERCISE - RARELY        Family History   Problem Relation Age of Onset   • Lupus Mother    • Heart failure Mother 59   • Heart disease Other    • Hypertension Other    • Heart attack Father    • Breast cancer Neg Hx         Review of Systems   Constitutional: Positive for activity change and fatigue. Negative for appetite change,  "fever and unexpected weight change.   HENT: Negative for congestion.    Respiratory: Negative for apnea, cough and shortness of breath.    Gastrointestinal: Negative for abdominal pain, blood in stool, nausea and vomiting.   Genitourinary: Negative for frequency and urgency.   Musculoskeletal: Positive for arthralgias, back pain and gait problem. Negative for neck stiffness.   Skin: Negative.    Neurological: Positive for dizziness and numbness. Negative for tremors, speech difficulty and light-headedness.   Hematological: Negative.    Psychiatric/Behavioral: Negative.        Objective     Vitals:    04/22/20 0929   BP: 131/70   Pulse: 87   Resp: 16   Temp: 97.8 °F (36.6 °C)   TempSrc: Oral   SpO2: 99%   Weight: Comment: Unable to stand for weight   Height: 157.5 cm (62.01\")   PainSc: 0-No pain     Current Status 4/22/2020   ECOG score 1       Physical Exam    CON: pleasant well-developed somewhat chronic ill appearing woman  HEENT: no icterus, no thrush, moist membranes  NECK: no jvd  LYMPH: No cervical or supraclavicular lymphadenopathy  CV: RRR, S1S2, no murmur  RESP: Lungs clear to auscultation bilaterally, no wheezing noted.  MUSC: No edema noted.  Poor mobility, and a wheelchair  NEURO: alert and oriented x3, mild global weakness  PSYCH: normal mood and affect  Exam unchanged- 4/22/2020    RECENT LABS:  Hematology WBC   Date Value Ref Range Status   04/22/2020 5.97 3.40 - 10.80 10*3/mm3 Final   04/23/2019 5.14 3.40 - 10.80 10*3/mm3 Final     RBC   Date Value Ref Range Status   04/22/2020 2.36 (L) 3.77 - 5.28 10*6/mm3 Final   04/23/2019 3.02 (L) 3.77 - 5.28 10*6/mm3 Final     Hemoglobin   Date Value Ref Range Status   04/22/2020 7.4 (L) 12.0 - 15.9 g/dL Final     Hematocrit   Date Value Ref Range Status   04/22/2020 24.1 (L) 34.0 - 46.6 % Final     Platelets   Date Value Ref Range Status   04/22/2020 386 140 - 450 10*3/mm3 Final      M spike/RANJANA asymmetrical gamma IgA lambda  Free light chain ratio " 1.7  Ferritin 1300    Assessment/Plan     1.  Macrocytic anemia, worse:  BM biopsy 3/5/19 c/w myelodysplastic syndrome with deletion of 5 q.  In addition, the patient has chronic kidney disease, stage III.  She is currently doing okay on weekly Procrit with transfusion support required about once per month.  She is having some increase in her transfusion requirements.  She has hemoglobin 7.4 today with significant symptoms of fatigue and dizziness.  We will transfuse 2 units of packed red blood cells with 20 mg IV Lasix in between units.  For now we will continue weekly CBC and Procrit.  She requires transfusion for hemoglobin 8.0 or less.  May consider initiating low-dose Revlimid after the COVID pandemic has improved.    2.  Monoclonal gammopathy of undetermined significance: The patient's bone marrow showed slight increase in plasma cells 8% of the cellularity but normal in morphology.  She has an IgA monoclonality on her immunofixation but no measurable M spike and a normal free light chain ratio.   Repeat studies 5/16/2019 showed a stable IgA 509 with a normal light chain ratio, no M spike.  Studies performed 10/24/2019 show stable IgA at 491, no M spike with mildly increased kappa/lambda light chain ratio related to her CKD.  Repeat studies 4/8/2020 reviewed today and stable.      3.  Chronic kidney disease, stage III which contributes to anemia    4.  Probable transfusional iron overload with ferritin 1300.  The patient has poor performance status but low-grade MDS and may benefit from iron chelation but complicated by her CKD.

## 2020-04-22 NOTE — PATIENT INSTRUCTIONS
"Call [facility] at [phone] if you have any problems or concerns.    We know you have a Choice in healthcare and appreciate you using [facility].  Our purpose is to provide you \"Excellent Care\".  We hope that you will always choose us in the future and continue to recommend us to your family and friends.      Blood Transfusion, Adult, Care After  This sheet gives you information about how to care for yourself after your procedure. Your health care provider may also give you more specific instructions. If you have problems or questions, contact your health care provider.  What can I expect after the procedure?  After your procedure, it is common to have:  · Bruising and soreness where the IV tube was inserted.  · Headache.  Follow these instructions at home:    · Take over-the-counter and prescription medicines only as told by your health care provider.  · Return to your normal activities as told by your health care provider.  · Follow instructions from your health care provider about how to take care of your IV insertion site. Make sure you:  ? Wash your hands with soap and water before you change your bandage (dressing). If soap and water are not available, use hand .  ? Change your dressing as told by your health care provider.  · Check your IV insertion site every day for signs of infection. Check for:  ? More redness, swelling, or pain.  ? More fluid or blood.  ? Warmth.  ? Pus or a bad smell.  Contact a health care provider if:  · You have more redness, swelling, or pain around the IV insertion site.  · You have more fluid or blood coming from the IV insertion site.  · Your IV insertion site feels warm to the touch.  · You have pus or a bad smell coming from the IV insertion site.  · Your urine turns pink, red, or brown.  · You feel weak after doing your normal activities.  Get help right away if:  · You have signs of a serious allergic or immune system reaction, " including:  ? Itchiness.  ? Hives.  ? Trouble breathing.  ? Anxiety.  ? Chest or lower back pain.  ? Fever, flushing, and chills.  ? Rapid pulse.  ? Rash.  ? Diarrhea.  ? Vomiting.  ? Dark urine.  ? Serious headache.  ? Dizziness.  ? Stiff neck.  ? Yellow coloration of the face or the white parts of the eyes (jaundice).  This information is not intended to replace advice given to you by your health care provider. Make sure you discuss any questions you have with your health care provider.  Document Released: 01/08/2016 Document Revised: 11/04/2019 Document Reviewed: 07/03/2017  [employer] Patient Education © 2020 [employer]

## 2020-04-22 NOTE — NURSING NOTE
Pt did not receive procrit today. She was instead transported to the hospital for a blood transfusion in the ACU.

## 2020-04-22 NOTE — NURSING NOTE
NURSING PROGRESS NOTE      Tolerated prescribed treatment without incident. Patient  declined AVS, Pt verbalized understanding.  Discharged to home, transported to [facility] via w/c @7950. Condition Satisfactory .  Kezia Benítez RN

## 2020-04-22 NOTE — NURSING NOTE
NURSING PROGRESS NOTE      1315 Call placed to Dr. Elieser Headley's office , talked with Camila. Informed that patient c/o of itching and normally gets Benadryl as a pre-med,.none ordered. Per Camila/Dr. Headley , benadryl 25 mg to be given po. Kezia Benítez RN  1320 Benadryl 25 mg po given . Kezia Benítez RN

## 2020-04-23 LAB
BH BB BLOOD EXPIRATION DATE: NORMAL
BH BB BLOOD EXPIRATION DATE: NORMAL
BH BB BLOOD TYPE BARCODE: 7300
BH BB BLOOD TYPE BARCODE: 7300
BH BB DISPENSE STATUS: NORMAL
BH BB DISPENSE STATUS: NORMAL
BH BB PRODUCT CODE: NORMAL
BH BB PRODUCT CODE: NORMAL
BH BB UNIT NUMBER: NORMAL
BH BB UNIT NUMBER: NORMAL
CROSSMATCH INTERPRETATION: NORMAL
CROSSMATCH INTERPRETATION: NORMAL
UNIT  ABO: NORMAL
UNIT  ABO: NORMAL
UNIT  RH: NORMAL
UNIT  RH: NORMAL

## 2020-04-29 ENCOUNTER — LAB (OUTPATIENT)
Dept: LAB | Facility: HOSPITAL | Age: 80
End: 2020-04-29

## 2020-04-29 ENCOUNTER — INFUSION (OUTPATIENT)
Dept: ONCOLOGY | Facility: HOSPITAL | Age: 80
End: 2020-04-29

## 2020-04-29 VITALS
DIASTOLIC BLOOD PRESSURE: 53 MMHG | HEART RATE: 75 BPM | SYSTOLIC BLOOD PRESSURE: 101 MMHG | OXYGEN SATURATION: 95 % | TEMPERATURE: 98.2 F

## 2020-04-29 DIAGNOSIS — D46.C MYELODYSPLASTIC SYNDROME WITH 5Q DELETION (HCC): ICD-10-CM

## 2020-04-29 DIAGNOSIS — D64.9 ANEMIA REQUIRING TRANSFUSIONS: Primary | ICD-10-CM

## 2020-04-29 DIAGNOSIS — D64.9 CHRONIC ANEMIA: ICD-10-CM

## 2020-04-29 DIAGNOSIS — N18.30 CKD STAGE 3 DUE TO TYPE 2 DIABETES MELLITUS (HCC): ICD-10-CM

## 2020-04-29 DIAGNOSIS — E11.22 CKD STAGE 3 DUE TO TYPE 2 DIABETES MELLITUS (HCC): ICD-10-CM

## 2020-04-29 LAB
BASOPHILS # BLD AUTO: 0.07 10*3/MM3 (ref 0–0.2)
BASOPHILS NFR BLD AUTO: 1.1 % (ref 0–1.5)
DEPRECATED RDW RBC AUTO: 79.2 FL (ref 37–54)
EOSINOPHIL # BLD AUTO: 0.35 10*3/MM3 (ref 0–0.4)
EOSINOPHIL NFR BLD AUTO: 5.5 % (ref 0.3–6.2)
ERYTHROCYTE [DISTWIDTH] IN BLOOD BY AUTOMATED COUNT: 23.5 % (ref 12.3–15.4)
HCT VFR BLD AUTO: 27.9 % (ref 34–46.6)
HGB BLD-MCNC: 9.1 G/DL (ref 12–15.9)
IMM GRANULOCYTES # BLD AUTO: 0.05 10*3/MM3 (ref 0–0.05)
IMM GRANULOCYTES NFR BLD AUTO: 0.8 % (ref 0–0.5)
LYMPHOCYTES # BLD AUTO: 2.37 10*3/MM3 (ref 0.7–3.1)
LYMPHOCYTES NFR BLD AUTO: 37.1 % (ref 19.6–45.3)
MCH RBC QN AUTO: 31.5 PG (ref 26.6–33)
MCHC RBC AUTO-ENTMCNC: 32.6 G/DL (ref 31.5–35.7)
MCV RBC AUTO: 96.5 FL (ref 79–97)
MONOCYTES # BLD AUTO: 0.23 10*3/MM3 (ref 0.1–0.9)
MONOCYTES NFR BLD AUTO: 3.6 % (ref 5–12)
NEUTROPHILS # BLD AUTO: 3.31 10*3/MM3 (ref 1.7–7)
NEUTROPHILS NFR BLD AUTO: 51.9 % (ref 42.7–76)
NRBC BLD AUTO-RTO: 0 /100 WBC (ref 0–0.2)
PLATELET # BLD AUTO: 437 10*3/MM3 (ref 140–450)
PMV BLD AUTO: 11.4 FL (ref 6–12)
RBC # BLD AUTO: 2.89 10*6/MM3 (ref 3.77–5.28)
WBC NRBC COR # BLD: 6.38 10*3/MM3 (ref 3.4–10.8)

## 2020-04-29 PROCEDURE — 25010000002 EPOETIN ALFA PER 1000 UNITS: Performed by: INTERNAL MEDICINE

## 2020-04-29 PROCEDURE — 85025 COMPLETE CBC W/AUTO DIFF WBC: CPT | Performed by: INTERNAL MEDICINE

## 2020-04-29 PROCEDURE — 36415 COLL VENOUS BLD VENIPUNCTURE: CPT

## 2020-04-29 PROCEDURE — 96372 THER/PROPH/DIAG INJ SC/IM: CPT

## 2020-04-29 RX ADMIN — ERYTHROPOIETIN 60000 UNITS: 40000 INJECTION, SOLUTION INTRAVENOUS; SUBCUTANEOUS at 10:15

## 2020-05-05 ENCOUNTER — TELEPHONE (OUTPATIENT)
Dept: INTERNAL MEDICINE | Facility: CLINIC | Age: 80
End: 2020-05-05

## 2020-05-05 NOTE — TELEPHONE ENCOUNTER
PATIENT CALLED AND STATED SHE HAS ANOTHER TRACT INFECTION. SHE SYLVESTER LIKE TO KNOW IF SOMETHING COULD BE SENT IN TO HELP HER WITH HER DISCOMFORT. PLEASE ADVISE.     PHARMACY IS [facility]     PATIENT CALL BACK [phone]

## 2020-05-06 ENCOUNTER — INFUSION (OUTPATIENT)
Dept: ONCOLOGY | Facility: HOSPITAL | Age: 80
End: 2020-05-06

## 2020-05-06 ENCOUNTER — TELEPHONE (OUTPATIENT)
Dept: INTERNAL MEDICINE | Facility: CLINIC | Age: 80
End: 2020-05-06

## 2020-05-06 ENCOUNTER — LAB (OUTPATIENT)
Dept: LAB | Facility: HOSPITAL | Age: 80
End: 2020-05-06

## 2020-05-06 VITALS
TEMPERATURE: 98.4 F | OXYGEN SATURATION: 97 % | SYSTOLIC BLOOD PRESSURE: 128 MMHG | DIASTOLIC BLOOD PRESSURE: 65 MMHG | HEART RATE: 77 BPM

## 2020-05-06 DIAGNOSIS — D46.C MYELODYSPLASTIC SYNDROME WITH 5Q DELETION (HCC): ICD-10-CM

## 2020-05-06 DIAGNOSIS — F41.9 ANXIETY AND DEPRESSION: ICD-10-CM

## 2020-05-06 DIAGNOSIS — D64.9 ANEMIA REQUIRING TRANSFUSIONS: Primary | ICD-10-CM

## 2020-05-06 DIAGNOSIS — K21.9 GASTROESOPHAGEAL REFLUX DISEASE, ESOPHAGITIS PRESENCE NOT SPECIFIED: ICD-10-CM

## 2020-05-06 DIAGNOSIS — E11.22 CKD STAGE 3 DUE TO TYPE 2 DIABETES MELLITUS (HCC): ICD-10-CM

## 2020-05-06 DIAGNOSIS — D64.9 CHRONIC ANEMIA: ICD-10-CM

## 2020-05-06 DIAGNOSIS — M25.561 ACUTE PAIN OF RIGHT KNEE: ICD-10-CM

## 2020-05-06 DIAGNOSIS — E03.9 ACQUIRED HYPOTHYROIDISM: ICD-10-CM

## 2020-05-06 DIAGNOSIS — F32.A ANXIETY AND DEPRESSION: ICD-10-CM

## 2020-05-06 DIAGNOSIS — N18.30 CKD STAGE 3 DUE TO TYPE 2 DIABETES MELLITUS (HCC): ICD-10-CM

## 2020-05-06 LAB
BASOPHILS # BLD AUTO: 0.09 10*3/MM3 (ref 0–0.2)
BASOPHILS NFR BLD AUTO: 1.4 % (ref 0–1.5)
DEPRECATED RDW RBC AUTO: 83.5 FL (ref 37–54)
EOSINOPHIL # BLD AUTO: 0.31 10*3/MM3 (ref 0–0.4)
EOSINOPHIL NFR BLD AUTO: 4.7 % (ref 0.3–6.2)
ERYTHROCYTE [DISTWIDTH] IN BLOOD BY AUTOMATED COUNT: 24.2 % (ref 12.3–15.4)
HCT VFR BLD AUTO: 27 % (ref 34–46.6)
HGB BLD-MCNC: 8.9 G/DL (ref 12–15.9)
IMM GRANULOCYTES # BLD AUTO: 0.14 10*3/MM3 (ref 0–0.05)
IMM GRANULOCYTES NFR BLD AUTO: 2.1 % (ref 0–0.5)
LYMPHOCYTES # BLD AUTO: 2.28 10*3/MM3 (ref 0.7–3.1)
LYMPHOCYTES NFR BLD AUTO: 34.9 % (ref 19.6–45.3)
MCH RBC QN AUTO: 32.8 PG (ref 26.6–33)
MCHC RBC AUTO-ENTMCNC: 33 G/DL (ref 31.5–35.7)
MCV RBC AUTO: 99.6 FL (ref 79–97)
MONOCYTES # BLD AUTO: 0.26 10*3/MM3 (ref 0.1–0.9)
MONOCYTES NFR BLD AUTO: 4 % (ref 5–12)
NEUTROPHILS # BLD AUTO: 3.46 10*3/MM3 (ref 1.7–7)
NEUTROPHILS NFR BLD AUTO: 52.9 % (ref 42.7–76)
NRBC BLD AUTO-RTO: 0 /100 WBC (ref 0–0.2)
PLATELET # BLD AUTO: 414 10*3/MM3 (ref 140–450)
PMV BLD AUTO: 11.5 FL (ref 6–12)
RBC # BLD AUTO: 2.71 10*6/MM3 (ref 3.77–5.28)
WBC NRBC COR # BLD: 6.54 10*3/MM3 (ref 3.4–10.8)

## 2020-05-06 PROCEDURE — 85025 COMPLETE CBC W/AUTO DIFF WBC: CPT | Performed by: INTERNAL MEDICINE

## 2020-05-06 PROCEDURE — 96372 THER/PROPH/DIAG INJ SC/IM: CPT

## 2020-05-06 PROCEDURE — 25010000002 EPOETIN ALFA PER 1000 UNITS: Performed by: INTERNAL MEDICINE

## 2020-05-06 PROCEDURE — 36415 COLL VENOUS BLD VENIPUNCTURE: CPT

## 2020-05-06 RX ORDER — CEPHALEXIN 500 MG/1
500 CAPSULE ORAL 2 TIMES DAILY
Qty: 20 CAPSULE | Refills: 0 | Status: SHIPPED | OUTPATIENT
Start: 2020-05-06 | End: 2020-07-15

## 2020-05-06 RX ORDER — APIXABAN 5 MG/1
TABLET, FILM COATED ORAL
Qty: 180 TABLET | Refills: 0 | Status: SHIPPED | OUTPATIENT
Start: 2020-05-06 | End: 2020-06-30

## 2020-05-06 RX ORDER — OMEPRAZOLE 40 MG/1
CAPSULE, DELAYED RELEASE ORAL
Qty: 90 CAPSULE | Refills: 1 | Status: SHIPPED | OUTPATIENT
Start: 2020-05-06 | End: 2020-10-21

## 2020-05-06 RX ORDER — DESVENLAFAXINE SUCCINATE 50 MG/1
TABLET, EXTENDED RELEASE ORAL
Qty: 30 TABLET | Refills: 0 | Status: SHIPPED | OUTPATIENT
Start: 2020-05-06 | End: 2020-06-05

## 2020-05-06 RX ORDER — GABAPENTIN 400 MG/1
CAPSULE ORAL
Qty: 120 CAPSULE | Refills: 0 | Status: SHIPPED | OUTPATIENT
Start: 2020-05-06 | End: 2020-06-05

## 2020-05-06 RX ORDER — LEVOTHYROXINE SODIUM 88 UG/1
TABLET ORAL
Qty: 90 TABLET | Refills: 1 | Status: SHIPPED | OUTPATIENT
Start: 2020-05-06 | End: 2020-10-21

## 2020-05-06 RX ADMIN — ERYTHROPOIETIN 60000 UNITS: 40000 INJECTION, SOLUTION INTRAVENOUS; SUBCUTANEOUS at 10:17

## 2020-05-06 NOTE — TELEPHONE ENCOUNTER
"Pt advised HH \"won't\" come to her house and see her. Abx sent in ok per Dr. Mercado. Advised pt to call our office ASAP if UTI sx worsen/fail to improve etc. Pt voiced ok. Pt asking when she should schedule f/u with Dr. Mercado. Pt voiced her concerns about COVID-19 and when Dr. Mercado feels it is \"safe\" for pt to be in office. Pt does not have video visit access.   --------------------  Me   12:28 PM   Note      OV? Or does pt have home health and we could order UA?      12:28 PM   You routed this conversation to Chari Mercado MD   May 6, 2020   Chari Mercado MD    2:11 PM   Note      HH would be best if available.  Otherwise I'll send in Keflex 500mg po bid x 10 days          "

## 2020-05-06 NOTE — TELEPHONE ENCOUNTER
PATIENT CALLED TO CHECK ON THE STATUES OF GETTING SOMETHING CALLED INTO HER [facility] PHARM THAT IS IN HER CHART FOR HER UTI.      PT STATES THAT SHE IS BURNING REALLY BAD AND WOULD LIKE TO HAVE SOMETHING CALLED IN AS SOON AS POSSIBLE.      PLEASE ADVISE AND CALL PT BACK [phone].

## 2020-05-08 RX ORDER — MIDODRINE HYDROCHLORIDE 2.5 MG/1
TABLET ORAL
Qty: 90 TABLET | Refills: 1 | Status: SHIPPED | OUTPATIENT
Start: 2020-05-08 | End: 2020-06-04

## 2020-05-13 ENCOUNTER — LAB (OUTPATIENT)
Dept: LAB | Facility: HOSPITAL | Age: 80
End: 2020-05-13

## 2020-05-13 ENCOUNTER — INFUSION (OUTPATIENT)
Dept: ONCOLOGY | Facility: HOSPITAL | Age: 80
End: 2020-05-13

## 2020-05-13 VITALS
OXYGEN SATURATION: 95 % | SYSTOLIC BLOOD PRESSURE: 115 MMHG | DIASTOLIC BLOOD PRESSURE: 57 MMHG | HEART RATE: 73 BPM | TEMPERATURE: 98.4 F

## 2020-05-13 DIAGNOSIS — D64.9 ANEMIA REQUIRING TRANSFUSIONS: Primary | ICD-10-CM

## 2020-05-13 DIAGNOSIS — N18.30 CKD STAGE 3 DUE TO TYPE 2 DIABETES MELLITUS (HCC): ICD-10-CM

## 2020-05-13 DIAGNOSIS — D46.C MYELODYSPLASTIC SYNDROME WITH 5Q DELETION (HCC): ICD-10-CM

## 2020-05-13 DIAGNOSIS — E11.22 CKD STAGE 3 DUE TO TYPE 2 DIABETES MELLITUS (HCC): ICD-10-CM

## 2020-05-13 DIAGNOSIS — D64.9 CHRONIC ANEMIA: ICD-10-CM

## 2020-05-13 LAB
ABO GROUP BLD: NORMAL
BASOPHILS # BLD AUTO: 0.14 10*3/MM3 (ref 0–0.2)
BASOPHILS NFR BLD AUTO: 2.9 % (ref 0–1.5)
BLD GP AB SCN SERPL QL: NEGATIVE
DEPRECATED RDW RBC AUTO: 87.2 FL (ref 37–54)
EOSINOPHIL # BLD AUTO: 0.3 10*3/MM3 (ref 0–0.4)
EOSINOPHIL NFR BLD AUTO: 6.3 % (ref 0.3–6.2)
ERYTHROCYTE [DISTWIDTH] IN BLOOD BY AUTOMATED COUNT: 24.6 % (ref 12.3–15.4)
HCT VFR BLD AUTO: 26 % (ref 34–46.6)
HGB BLD-MCNC: 8 G/DL (ref 12–15.9)
IMM GRANULOCYTES # BLD AUTO: 0.03 10*3/MM3 (ref 0–0.05)
IMM GRANULOCYTES NFR BLD AUTO: 0.6 % (ref 0–0.5)
LYMPHOCYTES # BLD AUTO: 1.79 10*3/MM3 (ref 0.7–3.1)
LYMPHOCYTES NFR BLD AUTO: 37.4 % (ref 19.6–45.3)
MCH RBC QN AUTO: 32 PG (ref 26.6–33)
MCHC RBC AUTO-ENTMCNC: 30.8 G/DL (ref 31.5–35.7)
MCV RBC AUTO: 104 FL (ref 79–97)
MONOCYTES # BLD AUTO: 0.17 10*3/MM3 (ref 0.1–0.9)
MONOCYTES NFR BLD AUTO: 3.5 % (ref 5–12)
NEUTROPHILS # BLD AUTO: 2.36 10*3/MM3 (ref 1.7–7)
NEUTROPHILS NFR BLD AUTO: 49.3 % (ref 42.7–76)
PLATELET # BLD AUTO: 339 10*3/MM3 (ref 140–450)
PMV BLD AUTO: 11.1 FL (ref 6–12)
RBC # BLD AUTO: 2.5 10*6/MM3 (ref 3.77–5.28)
RH BLD: POSITIVE
T&S EXPIRATION DATE: NORMAL
WBC NRBC COR # BLD: 4.79 10*3/MM3 (ref 3.4–10.8)

## 2020-05-13 PROCEDURE — 85025 COMPLETE CBC W/AUTO DIFF WBC: CPT | Performed by: INTERNAL MEDICINE

## 2020-05-13 PROCEDURE — 36415 COLL VENOUS BLD VENIPUNCTURE: CPT

## 2020-05-13 PROCEDURE — 86901 BLOOD TYPING SEROLOGIC RH(D): CPT | Performed by: INTERNAL MEDICINE

## 2020-05-13 PROCEDURE — 25010000002 EPOETIN ALFA PER 1000 UNITS: Performed by: INTERNAL MEDICINE

## 2020-05-13 PROCEDURE — 86923 COMPATIBILITY TEST ELECTRIC: CPT

## 2020-05-13 PROCEDURE — 96372 THER/PROPH/DIAG INJ SC/IM: CPT

## 2020-05-13 PROCEDURE — 86900 BLOOD TYPING SEROLOGIC ABO: CPT | Performed by: INTERNAL MEDICINE

## 2020-05-13 PROCEDURE — 86850 RBC ANTIBODY SCREEN: CPT | Performed by: INTERNAL MEDICINE

## 2020-05-13 RX ORDER — SODIUM CHLORIDE 9 MG/ML
250 INJECTION, SOLUTION INTRAVENOUS AS NEEDED
Status: CANCELLED | OUTPATIENT
Start: 2020-05-13

## 2020-05-13 RX ORDER — DIPHENHYDRAMINE HCL 25 MG
25 CAPSULE ORAL ONCE
Status: CANCELLED | OUTPATIENT
Start: 2020-05-13 | End: 2020-05-13

## 2020-05-13 RX ORDER — ACETAMINOPHEN 325 MG/1
650 TABLET ORAL ONCE
Status: CANCELLED | OUTPATIENT
Start: 2020-05-13 | End: 2020-05-13

## 2020-05-13 RX ORDER — FUROSEMIDE 10 MG/ML
20 INJECTION INTRAMUSCULAR; INTRAVENOUS ONCE
Status: CANCELLED | OUTPATIENT
Start: 2020-05-13 | End: 2020-05-13

## 2020-05-13 RX ADMIN — ERYTHROPOIETIN 60000 UNITS: 40000 INJECTION, SOLUTION INTRAVENOUS; SUBCUTANEOUS at 10:29

## 2020-05-13 NOTE — NURSING NOTE
HGB 8.0  Pt feeling more fatigue and dizzy upon standing.  Orders placed for 2 [facility]  T&S drawn and blood bank arm band placed on patient.

## 2020-05-14 ENCOUNTER — HOSPITAL ENCOUNTER (OUTPATIENT)
Dept: INFUSION THERAPY | Facility: HOSPITAL | Age: 80
Discharge: HOME | End: 2020-05-14
Admitting: INTERNAL MEDICINE

## 2020-05-14 VITALS
TEMPERATURE: 98.2 F | WEIGHT: 200 LBS | OXYGEN SATURATION: 92 % | BODY MASS INDEX: 37.76 KG/M2 | RESPIRATION RATE: 18 BRPM | DIASTOLIC BLOOD PRESSURE: 57 MMHG | HEIGHT: 61 IN | SYSTOLIC BLOOD PRESSURE: 134 MMHG | HEART RATE: 78 BPM

## 2020-05-14 DIAGNOSIS — D64.9 ANEMIA REQUIRING TRANSFUSIONS: ICD-10-CM

## 2020-05-14 DIAGNOSIS — N18.30 CKD STAGE 3 DUE TO TYPE 2 DIABETES MELLITUS (HCC): ICD-10-CM

## 2020-05-14 DIAGNOSIS — E11.22 CKD STAGE 3 DUE TO TYPE 2 DIABETES MELLITUS (HCC): ICD-10-CM

## 2020-05-14 PROCEDURE — 86900 BLOOD TYPING SEROLOGIC ABO: CPT

## 2020-05-14 PROCEDURE — A9270 NON-COVERED ITEM OR SERVICE: HCPCS | Performed by: INTERNAL MEDICINE

## 2020-05-14 PROCEDURE — 25010000002 FUROSEMIDE PER 20 MG: Performed by: INTERNAL MEDICINE

## 2020-05-14 PROCEDURE — P9016 RBC LEUKOCYTES REDUCED: HCPCS

## 2020-05-14 PROCEDURE — 36430 TRANSFUSION BLD/BLD COMPNT: CPT

## 2020-05-14 PROCEDURE — 96374 THER/PROPH/DIAG INJ IV PUSH: CPT

## 2020-05-14 PROCEDURE — 63710000001 ACETAMINOPHEN 325 MG TABLET: Performed by: INTERNAL MEDICINE

## 2020-05-14 PROCEDURE — 63710000001 DIPHENHYDRAMINE PER 50 MG: Performed by: INTERNAL MEDICINE

## 2020-05-14 RX ORDER — SODIUM CHLORIDE 9 MG/ML
250 INJECTION, SOLUTION INTRAVENOUS AS NEEDED
Status: DISCONTINUED | OUTPATIENT
Start: 2020-05-14 | End: 2020-05-16 | Stop reason: HOSPADM

## 2020-05-14 RX ORDER — DIPHENHYDRAMINE HCL 25 MG
25 CAPSULE ORAL ONCE
Status: COMPLETED | OUTPATIENT
Start: 2020-05-14 | End: 2020-05-14

## 2020-05-14 RX ORDER — FUROSEMIDE 10 MG/ML
20 INJECTION INTRAMUSCULAR; INTRAVENOUS ONCE
Status: COMPLETED | OUTPATIENT
Start: 2020-05-14 | End: 2020-05-14

## 2020-05-14 RX ORDER — ACETAMINOPHEN 325 MG/1
650 TABLET ORAL ONCE
Status: COMPLETED | OUTPATIENT
Start: 2020-05-14 | End: 2020-05-14

## 2020-05-14 RX ADMIN — FUROSEMIDE 20 MG: 10 INJECTION, SOLUTION INTRAMUSCULAR; INTRAVENOUS at 11:50

## 2020-05-14 RX ADMIN — ACETAMINOPHEN 650 MG: 325 TABLET ORAL at 09:06

## 2020-05-14 RX ADMIN — DIPHENHYDRAMINE HYDROCHLORIDE 25 MG: 25 CAPSULE ORAL at 09:06

## 2020-05-14 RX ADMIN — SODIUM CHLORIDE 250 ML: 9 INJECTION, SOLUTION INTRAVENOUS at 11:47

## 2020-05-14 NOTE — NURSING NOTE
Patient arrived to ACC unit at 0853 for scheduled infusions. VSS. 2 Units of PRBC's infused without incident. VSS. Pt declined AVS. Discharged per w/c to front of hospital. Son to transport home.

## 2020-05-15 LAB
BH BB BLOOD EXPIRATION DATE: NORMAL
BH BB BLOOD EXPIRATION DATE: NORMAL
BH BB BLOOD TYPE BARCODE: 5100
BH BB BLOOD TYPE BARCODE: 9500
BH BB DISPENSE STATUS: NORMAL
BH BB DISPENSE STATUS: NORMAL
BH BB PRODUCT CODE: NORMAL
BH BB PRODUCT CODE: NORMAL
BH BB UNIT NUMBER: NORMAL
BH BB UNIT NUMBER: NORMAL
CROSSMATCH INTERPRETATION: NORMAL
CROSSMATCH INTERPRETATION: NORMAL
UNIT  ABO: NORMAL
UNIT  ABO: NORMAL
UNIT  RH: NORMAL
UNIT  RH: NORMAL

## 2020-05-19 RX ORDER — INSULIN DEGLUDEC 200 U/ML
INJECTION, SOLUTION SUBCUTANEOUS
Qty: 9 ML | Refills: 11 | Status: SHIPPED | OUTPATIENT
Start: 2020-05-19 | End: 2021-01-13

## 2020-05-20 ENCOUNTER — LAB (OUTPATIENT)
Dept: LAB | Facility: HOSPITAL | Age: 80
End: 2020-05-20

## 2020-05-20 ENCOUNTER — INFUSION (OUTPATIENT)
Dept: ONCOLOGY | Facility: HOSPITAL | Age: 80
End: 2020-05-20

## 2020-05-20 DIAGNOSIS — D64.9 CHRONIC ANEMIA: ICD-10-CM

## 2020-05-20 DIAGNOSIS — D46.C MYELODYSPLASTIC SYNDROME WITH 5Q DELETION (HCC): ICD-10-CM

## 2020-05-20 LAB
BASOPHILS # BLD AUTO: 0.1 10*3/MM3 (ref 0–0.2)
BASOPHILS NFR BLD AUTO: 1.5 % (ref 0–1.5)
DEPRECATED RDW RBC AUTO: 78.1 FL (ref 37–54)
EOSINOPHIL # BLD AUTO: 0.42 10*3/MM3 (ref 0–0.4)
EOSINOPHIL NFR BLD AUTO: 6.3 % (ref 0.3–6.2)
ERYTHROCYTE [DISTWIDTH] IN BLOOD BY AUTOMATED COUNT: 22.8 % (ref 12.3–15.4)
HCT VFR BLD AUTO: 32.7 % (ref 34–46.6)
HGB BLD-MCNC: 10.7 G/DL (ref 12–15.9)
IMM GRANULOCYTES # BLD AUTO: 0.12 10*3/MM3 (ref 0–0.05)
IMM GRANULOCYTES NFR BLD AUTO: 1.8 % (ref 0–0.5)
LYMPHOCYTES # BLD AUTO: 2.18 10*3/MM3 (ref 0.7–3.1)
LYMPHOCYTES NFR BLD AUTO: 32.6 % (ref 19.6–45.3)
MCH RBC QN AUTO: 32.5 PG (ref 26.6–33)
MCHC RBC AUTO-ENTMCNC: 32.7 G/DL (ref 31.5–35.7)
MCV RBC AUTO: 99.4 FL (ref 79–97)
MONOCYTES # BLD AUTO: 0.28 10*3/MM3 (ref 0.1–0.9)
MONOCYTES NFR BLD AUTO: 4.2 % (ref 5–12)
NEUTROPHILS # BLD AUTO: 3.58 10*3/MM3 (ref 1.7–7)
NEUTROPHILS NFR BLD AUTO: 53.6 % (ref 42.7–76)
NRBC BLD AUTO-RTO: 0 /100 WBC (ref 0–0.2)
PLATELET # BLD AUTO: 344 10*3/MM3 (ref 140–450)
PMV BLD AUTO: 11.8 FL (ref 6–12)
RBC # BLD AUTO: 3.29 10*6/MM3 (ref 3.77–5.28)
WBC NRBC COR # BLD: 6.68 10*3/MM3 (ref 3.4–10.8)

## 2020-05-20 PROCEDURE — 85025 COMPLETE CBC W/AUTO DIFF WBC: CPT | Performed by: INTERNAL MEDICINE

## 2020-05-20 PROCEDURE — G0463 HOSPITAL OUTPT CLINIC VISIT: HCPCS

## 2020-05-20 PROCEDURE — 36415 COLL VENOUS BLD VENIPUNCTURE: CPT

## 2020-05-20 NOTE — NURSING NOTE
CBC r/w pt. Hgb much improved today at 10.7 following 2 units of blood and a procrit injection last week. The pt was instructed to followup as scheduled in one week. The pt v/u.

## 2020-05-27 ENCOUNTER — INFUSION (OUTPATIENT)
Dept: ONCOLOGY | Facility: HOSPITAL | Age: 80
End: 2020-05-27

## 2020-05-27 ENCOUNTER — LAB (OUTPATIENT)
Dept: LAB | Facility: HOSPITAL | Age: 80
End: 2020-05-27

## 2020-05-27 VITALS
TEMPERATURE: 98.1 F | OXYGEN SATURATION: 93 % | HEART RATE: 81 BPM | SYSTOLIC BLOOD PRESSURE: 112 MMHG | DIASTOLIC BLOOD PRESSURE: 77 MMHG

## 2020-05-27 DIAGNOSIS — N18.30 CKD STAGE 3 DUE TO TYPE 2 DIABETES MELLITUS (HCC): ICD-10-CM

## 2020-05-27 DIAGNOSIS — D46.C MYELODYSPLASTIC SYNDROME WITH 5Q DELETION (HCC): ICD-10-CM

## 2020-05-27 DIAGNOSIS — D64.9 CHRONIC ANEMIA: ICD-10-CM

## 2020-05-27 DIAGNOSIS — E11.22 CKD STAGE 3 DUE TO TYPE 2 DIABETES MELLITUS (HCC): ICD-10-CM

## 2020-05-27 DIAGNOSIS — D64.9 ANEMIA REQUIRING TRANSFUSIONS: Primary | ICD-10-CM

## 2020-05-27 LAB
BASOPHILS # BLD AUTO: 0.11 10*3/MM3 (ref 0–0.2)
BASOPHILS NFR BLD AUTO: 2.3 % (ref 0–1.5)
DEPRECATED RDW RBC AUTO: 84.9 FL (ref 37–54)
EOSINOPHIL # BLD AUTO: 0.33 10*3/MM3 (ref 0–0.4)
EOSINOPHIL NFR BLD AUTO: 6.8 % (ref 0.3–6.2)
ERYTHROCYTE [DISTWIDTH] IN BLOOD BY AUTOMATED COUNT: 23 % (ref 12.3–15.4)
HCT VFR BLD AUTO: 31.6 % (ref 34–46.6)
HGB BLD-MCNC: 9.9 G/DL (ref 12–15.9)
IMM GRANULOCYTES # BLD AUTO: 0.02 10*3/MM3 (ref 0–0.05)
IMM GRANULOCYTES NFR BLD AUTO: 0.4 % (ref 0–0.5)
LYMPHOCYTES # BLD AUTO: 1.81 10*3/MM3 (ref 0.7–3.1)
LYMPHOCYTES NFR BLD AUTO: 37.3 % (ref 19.6–45.3)
MCH RBC QN AUTO: 32.2 PG (ref 26.6–33)
MCHC RBC AUTO-ENTMCNC: 31.3 G/DL (ref 31.5–35.7)
MCV RBC AUTO: 102.9 FL (ref 79–97)
MONOCYTES # BLD AUTO: 0.16 10*3/MM3 (ref 0.1–0.9)
MONOCYTES NFR BLD AUTO: 3.3 % (ref 5–12)
NEUTROPHILS # BLD AUTO: 2.42 10*3/MM3 (ref 1.7–7)
NEUTROPHILS NFR BLD AUTO: 49.9 % (ref 42.7–76)
PLATELET # BLD AUTO: 369 10*3/MM3 (ref 140–450)
PMV BLD AUTO: 11.3 FL (ref 6–12)
RBC # BLD AUTO: 3.07 10*6/MM3 (ref 3.77–5.28)
WBC NRBC COR # BLD: 4.85 10*3/MM3 (ref 3.4–10.8)

## 2020-05-27 PROCEDURE — 36415 COLL VENOUS BLD VENIPUNCTURE: CPT

## 2020-05-27 PROCEDURE — 96372 THER/PROPH/DIAG INJ SC/IM: CPT

## 2020-05-27 PROCEDURE — 25010000002 EPOETIN ALFA PER 1000 UNITS: Performed by: INTERNAL MEDICINE

## 2020-05-27 PROCEDURE — 85025 COMPLETE CBC W/AUTO DIFF WBC: CPT | Performed by: INTERNAL MEDICINE

## 2020-05-27 RX ADMIN — ERYTHROPOIETIN 60000 UNITS: 40000 INJECTION, SOLUTION INTRAVENOUS; SUBCUTANEOUS at 10:10

## 2020-06-03 ENCOUNTER — LAB (OUTPATIENT)
Dept: LAB | Facility: HOSPITAL | Age: 80
End: 2020-06-03

## 2020-06-03 ENCOUNTER — INFUSION (OUTPATIENT)
Dept: ONCOLOGY | Facility: HOSPITAL | Age: 80
End: 2020-06-03

## 2020-06-03 VITALS
TEMPERATURE: 97.6 F | SYSTOLIC BLOOD PRESSURE: 108 MMHG | DIASTOLIC BLOOD PRESSURE: 65 MMHG | HEART RATE: 85 BPM | OXYGEN SATURATION: 95 %

## 2020-06-03 DIAGNOSIS — N18.30 CKD STAGE 3 DUE TO TYPE 2 DIABETES MELLITUS (HCC): ICD-10-CM

## 2020-06-03 DIAGNOSIS — D64.9 ANEMIA REQUIRING TRANSFUSIONS: Primary | ICD-10-CM

## 2020-06-03 DIAGNOSIS — E11.22 CKD STAGE 3 DUE TO TYPE 2 DIABETES MELLITUS (HCC): ICD-10-CM

## 2020-06-03 DIAGNOSIS — D46.C MYELODYSPLASTIC SYNDROME WITH 5Q DELETION (HCC): ICD-10-CM

## 2020-06-03 DIAGNOSIS — D64.9 CHRONIC ANEMIA: ICD-10-CM

## 2020-06-03 LAB
BASOPHILS # BLD AUTO: 0.08 10*3/MM3 (ref 0–0.2)
BASOPHILS NFR BLD AUTO: 1.2 % (ref 0–1.5)
DEPRECATED RDW RBC AUTO: 85.6 FL (ref 37–54)
EOSINOPHIL # BLD AUTO: 0.34 10*3/MM3 (ref 0–0.4)
EOSINOPHIL NFR BLD AUTO: 5.2 % (ref 0.3–6.2)
ERYTHROCYTE [DISTWIDTH] IN BLOOD BY AUTOMATED COUNT: 23.1 % (ref 12.3–15.4)
HCT VFR BLD AUTO: 30.2 % (ref 34–46.6)
HGB BLD-MCNC: 9.6 G/DL (ref 12–15.9)
IMM GRANULOCYTES # BLD AUTO: 0.11 10*3/MM3 (ref 0–0.05)
IMM GRANULOCYTES NFR BLD AUTO: 1.7 % (ref 0–0.5)
LYMPHOCYTES # BLD AUTO: 1.9 10*3/MM3 (ref 0.7–3.1)
LYMPHOCYTES NFR BLD AUTO: 29 % (ref 19.6–45.3)
MCH RBC QN AUTO: 33 PG (ref 26.6–33)
MCHC RBC AUTO-ENTMCNC: 31.8 G/DL (ref 31.5–35.7)
MCV RBC AUTO: 103.8 FL (ref 79–97)
MONOCYTES # BLD AUTO: 0.26 10*3/MM3 (ref 0.1–0.9)
MONOCYTES NFR BLD AUTO: 4 % (ref 5–12)
NEUTROPHILS # BLD AUTO: 3.87 10*3/MM3 (ref 1.7–7)
NEUTROPHILS NFR BLD AUTO: 58.9 % (ref 42.7–76)
NRBC BLD AUTO-RTO: 0 /100 WBC (ref 0–0.2)
PLATELET # BLD AUTO: 411 10*3/MM3 (ref 140–450)
PMV BLD AUTO: 11.4 FL (ref 6–12)
RBC # BLD AUTO: 2.91 10*6/MM3 (ref 3.77–5.28)
WBC NRBC COR # BLD: 6.56 10*3/MM3 (ref 3.4–10.8)

## 2020-06-03 PROCEDURE — 25010000002 EPOETIN ALFA PER 1000 UNITS: Performed by: INTERNAL MEDICINE

## 2020-06-03 PROCEDURE — 36415 COLL VENOUS BLD VENIPUNCTURE: CPT

## 2020-06-03 PROCEDURE — 96372 THER/PROPH/DIAG INJ SC/IM: CPT

## 2020-06-03 PROCEDURE — 85025 COMPLETE CBC W/AUTO DIFF WBC: CPT | Performed by: INTERNAL MEDICINE

## 2020-06-03 RX ADMIN — ERYTHROPOIETIN 60000 UNITS: 40000 INJECTION, SOLUTION INTRAVENOUS; SUBCUTANEOUS at 10:55

## 2020-06-04 DIAGNOSIS — F32.A ANXIETY AND DEPRESSION: ICD-10-CM

## 2020-06-04 DIAGNOSIS — M25.561 ACUTE PAIN OF RIGHT KNEE: ICD-10-CM

## 2020-06-04 DIAGNOSIS — F41.9 ANXIETY AND DEPRESSION: ICD-10-CM

## 2020-06-04 RX ORDER — MIDODRINE HYDROCHLORIDE 2.5 MG/1
TABLET ORAL
Qty: 90 TABLET | Refills: 1 | Status: SHIPPED | OUTPATIENT
Start: 2020-06-04 | End: 2020-07-29

## 2020-06-05 RX ORDER — GABAPENTIN 400 MG/1
CAPSULE ORAL
Qty: 120 CAPSULE | Refills: 0 | Status: SHIPPED | OUTPATIENT
Start: 2020-06-05 | End: 2020-06-30

## 2020-06-05 RX ORDER — DESVENLAFAXINE SUCCINATE 50 MG/1
TABLET, EXTENDED RELEASE ORAL
Qty: 30 TABLET | Refills: 0 | Status: SHIPPED | OUTPATIENT
Start: 2020-06-05 | End: 2020-06-30

## 2020-06-10 ENCOUNTER — INFUSION (OUTPATIENT)
Dept: ONCOLOGY | Facility: HOSPITAL | Age: 80
End: 2020-06-10

## 2020-06-10 ENCOUNTER — LAB (OUTPATIENT)
Dept: LAB | Facility: HOSPITAL | Age: 80
End: 2020-06-10

## 2020-06-10 VITALS — DIASTOLIC BLOOD PRESSURE: 74 MMHG | HEART RATE: 80 BPM | SYSTOLIC BLOOD PRESSURE: 111 MMHG

## 2020-06-10 DIAGNOSIS — D46.C MYELODYSPLASTIC SYNDROME WITH 5Q DELETION (HCC): ICD-10-CM

## 2020-06-10 DIAGNOSIS — D64.9 ANEMIA REQUIRING TRANSFUSIONS: ICD-10-CM

## 2020-06-10 DIAGNOSIS — D64.9 ANEMIA REQUIRING TRANSFUSIONS: Primary | ICD-10-CM

## 2020-06-10 DIAGNOSIS — E11.22 CKD STAGE 3 DUE TO TYPE 2 DIABETES MELLITUS (HCC): ICD-10-CM

## 2020-06-10 DIAGNOSIS — N18.30 CKD STAGE 3 DUE TO TYPE 2 DIABETES MELLITUS (HCC): ICD-10-CM

## 2020-06-10 LAB
BASOPHILS # BLD AUTO: 0.08 10*3/MM3 (ref 0–0.2)
BASOPHILS NFR BLD AUTO: 1.4 % (ref 0–1.5)
DEPRECATED RDW RBC AUTO: 85.3 FL (ref 37–54)
EOSINOPHIL # BLD AUTO: 0.33 10*3/MM3 (ref 0–0.4)
EOSINOPHIL NFR BLD AUTO: 5.7 % (ref 0.3–6.2)
ERYTHROCYTE [DISTWIDTH] IN BLOOD BY AUTOMATED COUNT: 23.8 % (ref 12.3–15.4)
HCT VFR BLD AUTO: 30.6 % (ref 34–46.6)
HGB BLD-MCNC: 9.8 G/DL (ref 12–15.9)
IMM GRANULOCYTES # BLD AUTO: 0.08 10*3/MM3 (ref 0–0.05)
IMM GRANULOCYTES NFR BLD AUTO: 1.4 % (ref 0–0.5)
LYMPHOCYTES # BLD AUTO: 2.14 10*3/MM3 (ref 0.7–3.1)
LYMPHOCYTES NFR BLD AUTO: 37 % (ref 19.6–45.3)
MCH RBC QN AUTO: 32.8 PG (ref 26.6–33)
MCHC RBC AUTO-ENTMCNC: 32 G/DL (ref 31.5–35.7)
MCV RBC AUTO: 102.3 FL (ref 79–97)
MONOCYTES # BLD AUTO: 0.21 10*3/MM3 (ref 0.1–0.9)
MONOCYTES NFR BLD AUTO: 3.6 % (ref 5–12)
NEUTROPHILS # BLD AUTO: 2.94 10*3/MM3 (ref 1.7–7)
NEUTROPHILS NFR BLD AUTO: 50.9 % (ref 42.7–76)
NRBC BLD AUTO-RTO: 0 /100 WBC (ref 0–0.2)
PLATELET # BLD AUTO: 356 10*3/MM3 (ref 140–450)
PMV BLD AUTO: 11.7 FL (ref 6–12)
RBC # BLD AUTO: 2.99 10*6/MM3 (ref 3.77–5.28)
WBC NRBC COR # BLD: 5.78 10*3/MM3 (ref 3.4–10.8)

## 2020-06-10 PROCEDURE — 36415 COLL VENOUS BLD VENIPUNCTURE: CPT

## 2020-06-10 PROCEDURE — 25010000002 EPOETIN ALFA PER 1000 UNITS: Performed by: INTERNAL MEDICINE

## 2020-06-10 PROCEDURE — 85025 COMPLETE CBC W/AUTO DIFF WBC: CPT | Performed by: INTERNAL MEDICINE

## 2020-06-10 PROCEDURE — 96372 THER/PROPH/DIAG INJ SC/IM: CPT

## 2020-06-10 RX ADMIN — ERYTHROPOIETIN 60000 UNITS: 40000 INJECTION, SOLUTION INTRAVENOUS; SUBCUTANEOUS at 10:22

## 2020-06-17 ENCOUNTER — LAB (OUTPATIENT)
Dept: LAB | Facility: HOSPITAL | Age: 80
End: 2020-06-17

## 2020-06-17 ENCOUNTER — INFUSION (OUTPATIENT)
Dept: ONCOLOGY | Facility: HOSPITAL | Age: 80
End: 2020-06-17

## 2020-06-17 VITALS
OXYGEN SATURATION: 99 % | TEMPERATURE: 98.2 F | HEART RATE: 70 BPM | SYSTOLIC BLOOD PRESSURE: 132 MMHG | DIASTOLIC BLOOD PRESSURE: 59 MMHG

## 2020-06-17 DIAGNOSIS — D64.9 ANEMIA REQUIRING TRANSFUSIONS: Primary | ICD-10-CM

## 2020-06-17 DIAGNOSIS — D64.9 ANEMIA REQUIRING TRANSFUSIONS: ICD-10-CM

## 2020-06-17 DIAGNOSIS — D46.C MYELODYSPLASTIC SYNDROME WITH 5Q DELETION (HCC): ICD-10-CM

## 2020-06-17 DIAGNOSIS — E11.22 CKD STAGE 3 DUE TO TYPE 2 DIABETES MELLITUS (HCC): ICD-10-CM

## 2020-06-17 DIAGNOSIS — N18.30 CKD STAGE 3 DUE TO TYPE 2 DIABETES MELLITUS (HCC): ICD-10-CM

## 2020-06-17 LAB
BASOPHILS # BLD AUTO: 0.14 10*3/MM3 (ref 0–0.2)
BASOPHILS NFR BLD AUTO: 2.3 % (ref 0–1.5)
DEPRECATED RDW RBC AUTO: 90.5 FL (ref 37–54)
EOSINOPHIL # BLD AUTO: 0.39 10*3/MM3 (ref 0–0.4)
EOSINOPHIL NFR BLD AUTO: 6.4 % (ref 0.3–6.2)
ERYTHROCYTE [DISTWIDTH] IN BLOOD BY AUTOMATED COUNT: 23.9 % (ref 12.3–15.4)
HCT VFR BLD AUTO: 28.7 % (ref 34–46.6)
HGB BLD-MCNC: 8.9 G/DL (ref 12–15.9)
IMM GRANULOCYTES # BLD AUTO: 0.04 10*3/MM3 (ref 0–0.05)
IMM GRANULOCYTES NFR BLD AUTO: 0.7 % (ref 0–0.5)
LYMPHOCYTES # BLD AUTO: 2.35 10*3/MM3 (ref 0.7–3.1)
LYMPHOCYTES NFR BLD AUTO: 38.7 % (ref 19.6–45.3)
MCH RBC QN AUTO: 32.8 PG (ref 26.6–33)
MCHC RBC AUTO-ENTMCNC: 31 G/DL (ref 31.5–35.7)
MCV RBC AUTO: 105.9 FL (ref 79–97)
MONOCYTES # BLD AUTO: 0.21 10*3/MM3 (ref 0.1–0.9)
MONOCYTES NFR BLD AUTO: 3.5 % (ref 5–12)
NEUTROPHILS # BLD AUTO: 2.94 10*3/MM3 (ref 1.7–7)
NEUTROPHILS NFR BLD AUTO: 48.4 % (ref 42.7–76)
PLATELET # BLD AUTO: 392 10*3/MM3 (ref 140–450)
PMV BLD AUTO: 11.2 FL (ref 6–12)
RBC # BLD AUTO: 2.71 10*6/MM3 (ref 3.77–5.28)
WBC NRBC COR # BLD: 6.07 10*3/MM3 (ref 3.4–10.8)

## 2020-06-17 PROCEDURE — 36415 COLL VENOUS BLD VENIPUNCTURE: CPT

## 2020-06-17 PROCEDURE — 25010000002 EPOETIN ALFA PER 1000 UNITS: Performed by: INTERNAL MEDICINE

## 2020-06-17 PROCEDURE — 85025 COMPLETE CBC W/AUTO DIFF WBC: CPT | Performed by: INTERNAL MEDICINE

## 2020-06-17 PROCEDURE — 96372 THER/PROPH/DIAG INJ SC/IM: CPT

## 2020-06-17 RX ADMIN — ERYTHROPOIETIN 60000 UNITS: 40000 INJECTION, SOLUTION INTRAVENOUS; SUBCUTANEOUS at 09:59

## 2020-06-22 ENCOUNTER — TELEPHONE (OUTPATIENT)
Dept: INTERNAL MEDICINE | Facility: CLINIC | Age: 80
End: 2020-06-22

## 2020-06-22 RX ORDER — SULFAMETHOXAZOLE AND TRIMETHOPRIM 800; 160 MG/1; MG/1
1 TABLET ORAL 2 TIMES DAILY
Qty: 10 TABLET | Refills: 0 | Status: SHIPPED | OUTPATIENT
Start: 2020-06-22 | End: 2020-06-27

## 2020-06-22 NOTE — TELEPHONE ENCOUNTER
Patient called and stated she is having an bad UTI. She advised that it is burning and itching bad and has been going on for 3 days. She would like to see if she can get an antibiotic called in.     Please advise patient at [phone]    Sent to [facility] acute hospital [address] [facility] [phone] [facility] [phone] FX

## 2020-06-23 ENCOUNTER — TELEPHONE (OUTPATIENT)
Dept: INTERNAL MEDICINE | Facility: CLINIC | Age: 80
End: 2020-06-23

## 2020-06-23 NOTE — TELEPHONE ENCOUNTER
Bactrim has been called in per Dr. Bradshaw, and I had LM on patient's VM this morning.  Melanie @ [facility] advised.  She will reach out to patient with this information.

## 2020-06-23 NOTE — TELEPHONE ENCOUNTER
"Message Summary    Nurse  from [facility] called in regards to patient. She states patient has a \"raging\" UTI. symptoms include Discoloration, odor, burning, nausea, and vomiting. Its her impression that patient may have to go to hospital if it goes untreated.Please  return call and advise.    Best Callback Number: [phone] ([facility] [place])   [phone] (Patient)    Patient Notes: n/a                  "

## 2020-06-24 ENCOUNTER — INFUSION (OUTPATIENT)
Dept: ONCOLOGY | Facility: HOSPITAL | Age: 80
End: 2020-06-24

## 2020-06-24 ENCOUNTER — LAB (OUTPATIENT)
Dept: LAB | Facility: HOSPITAL | Age: 80
End: 2020-06-24

## 2020-06-24 ENCOUNTER — OFFICE VISIT (OUTPATIENT)
Dept: ONCOLOGY | Facility: CLINIC | Age: 80
End: 2020-06-24

## 2020-06-24 VITALS
HEART RATE: 82 BPM | OXYGEN SATURATION: 95 % | HEIGHT: 61 IN | RESPIRATION RATE: 14 BRPM | SYSTOLIC BLOOD PRESSURE: 135 MMHG | DIASTOLIC BLOOD PRESSURE: 66 MMHG | WEIGHT: 197 LBS | BODY MASS INDEX: 37.19 KG/M2 | TEMPERATURE: 97.3 F

## 2020-06-24 DIAGNOSIS — D64.9 ANEMIA REQUIRING TRANSFUSIONS: ICD-10-CM

## 2020-06-24 DIAGNOSIS — D46.C MYELODYSPLASTIC SYNDROME WITH 5Q DELETION (HCC): Primary | ICD-10-CM

## 2020-06-24 DIAGNOSIS — D47.2 MONOCLONAL GAMMOPATHY OF UNKNOWN SIGNIFICANCE (MGUS): ICD-10-CM

## 2020-06-24 DIAGNOSIS — D46.C MYELODYSPLASTIC SYNDROME WITH 5Q DELETION (HCC): ICD-10-CM

## 2020-06-24 DIAGNOSIS — N18.30 CKD STAGE 3 DUE TO TYPE 2 DIABETES MELLITUS (HCC): ICD-10-CM

## 2020-06-24 DIAGNOSIS — E11.22 CKD STAGE 3 DUE TO TYPE 2 DIABETES MELLITUS (HCC): ICD-10-CM

## 2020-06-24 DIAGNOSIS — D64.9 ANEMIA REQUIRING TRANSFUSIONS: Primary | ICD-10-CM

## 2020-06-24 DIAGNOSIS — D64.9 CHRONIC ANEMIA: ICD-10-CM

## 2020-06-24 LAB
ALBUMIN SERPL-MCNC: 3.8 G/DL (ref 3.5–5.2)
ALBUMIN/GLOB SERPL: 1.1 G/DL
ALP SERPL-CCNC: 112 U/L (ref 39–117)
ALT SERPL W P-5'-P-CCNC: 10 U/L (ref 1–33)
ANION GAP SERPL CALCULATED.3IONS-SCNC: 12.3 MMOL/L (ref 5–15)
AST SERPL-CCNC: 11 U/L (ref 1–32)
BASOPHILS # BLD AUTO: 0.19 10*3/MM3 (ref 0–0.2)
BASOPHILS NFR BLD AUTO: 2.9 % (ref 0–1.5)
BILIRUB SERPL-MCNC: 0.7 MG/DL (ref 0.2–1.2)
BUN BLD-MCNC: 23 MG/DL (ref 8–23)
BUN/CREAT SERPL: 11.4 (ref 7–25)
CALCIUM SPEC-SCNC: 9.3 MG/DL (ref 8.6–10.5)
CHLORIDE SERPL-SCNC: 99 MMOL/L (ref 98–107)
CO2 SERPL-SCNC: 26.7 MMOL/L (ref 22–29)
CREAT BLD-MCNC: 2.02 MG/DL (ref 0.57–1)
DEPRECATED RDW RBC AUTO: 93.2 FL (ref 37–54)
EOSINOPHIL # BLD AUTO: 0.46 10*3/MM3 (ref 0–0.4)
EOSINOPHIL NFR BLD AUTO: 7.1 % (ref 0.3–6.2)
ERYTHROCYTE [DISTWIDTH] IN BLOOD BY AUTOMATED COUNT: 23.7 % (ref 12.3–15.4)
FERRITIN SERPL-MCNC: 1232 NG/ML (ref 13–150)
GFR SERPL CREATININE-BSD FRML MDRD: 24 ML/MIN/1.73
GLOBULIN UR ELPH-MCNC: 3.4 GM/DL
GLUCOSE BLD-MCNC: 180 MG/DL (ref 65–99)
HCT VFR BLD AUTO: 29 % (ref 34–46.6)
HGB BLD-MCNC: 9 G/DL (ref 12–15.9)
IMM GRANULOCYTES # BLD AUTO: 0.1 10*3/MM3 (ref 0–0.05)
IMM GRANULOCYTES NFR BLD AUTO: 1.5 % (ref 0–0.5)
IRON 24H UR-MRATE: 80 MCG/DL (ref 37–145)
IRON SATN MFR SERPL: 53 % (ref 20–50)
LYMPHOCYTES # BLD AUTO: 1.26 10*3/MM3 (ref 0.7–3.1)
LYMPHOCYTES NFR BLD AUTO: 19.4 % (ref 19.6–45.3)
MCH RBC QN AUTO: 33.5 PG (ref 26.6–33)
MCHC RBC AUTO-ENTMCNC: 31 G/DL (ref 31.5–35.7)
MCV RBC AUTO: 107.8 FL (ref 79–97)
MONOCYTES # BLD AUTO: 0.25 10*3/MM3 (ref 0.1–0.9)
MONOCYTES NFR BLD AUTO: 3.9 % (ref 5–12)
NEUTROPHILS # BLD AUTO: 4.23 10*3/MM3 (ref 1.7–7)
NEUTROPHILS NFR BLD AUTO: 65.2 % (ref 42.7–76)
PLATELET # BLD AUTO: 400 10*3/MM3 (ref 140–450)
PMV BLD AUTO: 11.3 FL (ref 6–12)
POTASSIUM BLD-SCNC: 4.1 MMOL/L (ref 3.5–5.2)
PROT SERPL-MCNC: 7.2 G/DL (ref 6–8.5)
RBC # BLD AUTO: 2.69 10*6/MM3 (ref 3.77–5.28)
SODIUM BLD-SCNC: 138 MMOL/L (ref 136–145)
TIBC SERPL-MCNC: 150 MCG/DL (ref 298–536)
UIBC SERPL-MCNC: 70 MCG/DL (ref 112–346)
WBC NRBC COR # BLD: 6.49 10*3/MM3 (ref 3.4–10.8)

## 2020-06-24 PROCEDURE — 83540 ASSAY OF IRON: CPT | Performed by: INTERNAL MEDICINE

## 2020-06-24 PROCEDURE — 25010000002 EPOETIN ALFA PER 1000 UNITS: Performed by: INTERNAL MEDICINE

## 2020-06-24 PROCEDURE — 99213 OFFICE O/P EST LOW 20 MIN: CPT | Performed by: INTERNAL MEDICINE

## 2020-06-24 PROCEDURE — 82728 ASSAY OF FERRITIN: CPT | Performed by: INTERNAL MEDICINE

## 2020-06-24 PROCEDURE — 80053 COMPREHEN METABOLIC PANEL: CPT | Performed by: INTERNAL MEDICINE

## 2020-06-24 PROCEDURE — 83550 IRON BINDING TEST: CPT | Performed by: INTERNAL MEDICINE

## 2020-06-24 PROCEDURE — 85025 COMPLETE CBC W/AUTO DIFF WBC: CPT | Performed by: INTERNAL MEDICINE

## 2020-06-24 PROCEDURE — 36415 COLL VENOUS BLD VENIPUNCTURE: CPT

## 2020-06-24 PROCEDURE — 96372 THER/PROPH/DIAG INJ SC/IM: CPT

## 2020-06-24 RX ADMIN — ERYTHROPOIETIN 60000 UNITS: 40000 INJECTION, SOLUTION INTRAVENOUS; SUBCUTANEOUS at 10:21

## 2020-06-24 NOTE — PROGRESS NOTES
Subjective     REASON FOR FOLLOW-UP:   1.  Macrocytic anemia secondary to myelodysplastic syndrome with 5 q. minus and chronic kidney disease  2.  Monoclonal gammopathy of undetermined significance                             REQUESTING PHYSICIAN:  Dr. Baugh      History of Present Illness   Ms. Singh is a nikita 80-year-old woman returning today for follow-up of her myelodysplastic syndrome with anemia.  She comes in today doing reasonably well from a blood perspective with no significant increase in fatigue, lightheadedness, dizziness or shortness of breath.  She does complain of significant dysuria and is on an antibiotic for treatment of a urinary tract infection.  She denies fevers or chills.    Hematology History:  Patient presented as 78-year-old woman for evaluation of anemia.  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  She has stage III chronic kidney disease followed by Dr. Garza of nephrology.  Reviewing her records, the patient has had anemia present since at least 2014 but her hemoglobin has worsened over the past 6 months.  The patient was admitted to the hospital in October 2018 with symptomatic anemia, shortness of breath and lightheadedness.  She was found to have hemoglobin of 7.0.  There was concern for GI blood loss although EGD and colonoscopy performed showed no obvious etiology of blood loss.  The patient states that she was transfused 7 units of packed red blood cells during the hospital stay.  I do not see any Hemoccult results.  She was previously on Eliquis which was discontinued.  Since discharge in October, the patient has been receiving weekly Procrit 20,000 units in the [facility] at [facility], but despite Procrit she has required transfusion on 2 separate occasions.  She is not seeing any bright red blood per rectum or melena.  She complains of fatigue and lightheadedness.    Recent iron profile on 1/17/19  was inconsistent with iron deficiency with an iron saturation 68% and the ferritin was 352.  The red blood cells are macrocytic.  White blood cell and platelet counts have been normal.    The patient was seen in hematology on 1/29/19 and additional evaluation performed.  The reticulocyte count was elevated 3.72% but the haptoglobin and LDH were both normal arguing against a hemolytic process.  B12 and folic acid levels were normal.  Serum protein electrophoresis showed no M spike but the immunofixation identified a small IgA monoclonal protein with lambda specificity; IgA level 544.  Sedimentation rate elevated 58.  A free light chain ratio from the serum was normal 1.64.    The patient was referred for a bone marrow exam performed on 3/6/2019 which showed a cellularity of 35%.  There was erythroid hyperplasia with megaloblastoid changes, normal myeloid maturation, increased megakaryocytes with frequent micro megakaryocytes, scattered lymphoid aggregates.  There were morphologically normal plasma cells increased in #2 8% of the cellularity.  There were no increased blasts.  No increase in iron stores.  Karyotype showed a deletion 5 q. and 19 of 20 cells analyzed.        Past Medical History:   Diagnosis Date   • Allergic rhinitis    • Anemia    • Anxiety    • Appetite absent    • Arthritis    • Asthma    • Back pain    • Bell's palsy    • Black tarry stools    • Blood in stool    • Chronic fatigue    • CKD (chronic kidney disease) stage 3, GFR 30-59 ml/min (CMS/HCC)    • Community acquired pneumonia of left lung 10/25/2018   • Cough    • Depression    • Diabetes mellitus (CMS/HCC)     LAST A1C 6   • Diabetic gastroparesis (CMS/HCC) 2/19/2016   • Difficulty walking    • Excessive urination at night    • Frequent urination    • GERD (gastroesophageal reflux disease)    • GI bleed    • Gout    • H/O blood clots     LEFT LEG 7 OR 8 YEARS AGO   • Heat intolerance    • History of fall 10/2018   • History of prior  pregnancies     x8, miscarriage 5   • History of transfusion 11/2018    due to anemia   • Hyperlipidemia    • Hypertension    • Hypothyroidism    • Normal coronary arteries     by cath 2013   • Orthostatic hypotension    • AARON (obstructive sleep apnea)     DOESNT WEAR REGULARLY   • PONV (postoperative nausea and vomiting)    • Skin cancer    • Stroke (CMS/HCC)     Several mini-strokes   • TIA (transient ischemic attack)     LAST TIA JULY 2017   • Urination pain    • UTI (urinary tract infection)     Dec 2018 and Jan 2019        Past Surgical History:   Procedure Laterality Date   • BACK SURGERY      HARDWARE   • CHOLECYSTECTOMY      OPEN   • COLONOSCOPY  2011    due for repeat in 2021   • COLONOSCOPY N/A 10/28/2018    Procedure: COLONOSCOPY;  Surgeon: Emmanuel Rogers MD;  Location: [facility] OR;  Service: Gastroenterology   • ENDOSCOPY N/A 10/26/2018    Procedure: ESOPHAGOGASTRODUODENOSCOPY;  Surgeon: Emmanuel Rogers MD;  Location: [facility] OR;  Service: Gastroenterology   • HYSTERECTOMY      PARTIAL    • KNEE SURGERY     • NECK SURGERY     • SPINE SURGERY     • TUMOR REMOVAL Left     Leg   • UPPER GASTROINTESTINAL ENDOSCOPY  2014    gastritis.  done by dr. hastings        Current Outpatient Medications on File Prior to Visit   Medication Sig Dispense Refill   • ACCU-CHEK FASTCLIX LANCETS misc TEST 3-4 TIMES DAILY AS DIRECTED 400 each 3   • ACCU-CHEK SMARTVIEW test strip TEST BLOOD SUGAR THREE TIMES DAILY OR AS DIRECTED 300 each 3   • acetaminophen (TYLENOL) 325 MG tablet Take 2 tablets by mouth Every 6 (Six) Hours As Needed for Mild Pain . OTC product 40 tablet 0   • albuterol sulfate  (90 Base) MCG/ACT inhaler Inhale 2 puffs Every 4 (Four) Hours As Needed for Wheezing. 1 inhaler 3   • atorvastatin (LIPITOR) 10 MG tablet TAKE ONE TABLET BY MOUTH AT BEDTIME 90 tablet 1   • benzonatate (TESSALON) 200 MG capsule Take 1 capsule by mouth 3 (Three) Times a Day As Needed for Cough. 20 capsule 2   •  Blood Glucose Monitoring Suppl (ACCU-CHEK KENNETH SMARTVIEW) w/Device kit TEST blood sugar three times daily or as directed 1 kit 0   • cephalexin (Keflex) 500 MG capsule Take 1 capsule by mouth 2 (Two) Times a Day. 20 capsule 0   • cholecalciferol 2000 units tablet Take 2,000 Units by mouth Daily.     • Continuous Blood Gluc  (FREESTYLE MATILDA 14 DAY READER) device 1 each 6 (Six) Times a Day. 1 Device 0   • Continuous Blood Gluc Sensor (FREESTYLE MATILDA 14 DAY SENSOR) misc 1 each 6 (Six) Times a Day. 2 each 11   • cyclobenzaprine (FLEXERIL) 10 MG tablet TAKE ONE TABLET BY MOUTH TWICE DAILY as needed for muscle spasms 30 tablet 0   • desvenlafaxine (PRISTIQ) 50 MG 24 hr tablet TAKE ONE TABLET BY MOUTH EVERY DAY 30 tablet 0   • diphenhydrAMINE (BENADRYL) 25 mg capsule Take 25 mg by mouth Every 6 (Six) Hours As Needed for Itching.     • ELIQUIS 5 MG tablet tablet TAKE ONE TABLET BY MOUTH TWICE DAILY 180 tablet 0   • epoetin chu 94591 UNIT/ML solution 20,000 Units, epoetin chu 89369 UNIT/ML solution 40,000 Units Inject 60,000 Units under the skin into the appropriate area as directed 1 (One) Time Per Week.     • furosemide (LASIX) 20 MG tablet TAKE ONE (1) TABLET ORALLY (BY MOUTH) ONCE DAILY 90 tablet 1   • gabapentin (NEURONTIN) 400 MG capsule TAKE ONE CAPSULE BY MOUTH EVERY MORNING, AT NOON, AND TWO AT BEDTIME 120 capsule 0   • HYDROcodone-acetaminophen (NORCO) 5-325 MG per tablet Take 1 tablet by mouth Every 6 (Six) Hours As Needed for Moderate Pain . 60 tablet 0   • insulin aspart (novoLOG) 100 UNIT/ML injection Inject 5 Units under the skin into the appropriate area as directed 3 (Three) Times a Day With Meals.  12   • levothyroxine (SYNTHROID, LEVOTHROID) 88 MCG tablet TAKE ONE TABLET BY MOUTH EVERY DAY 90 tablet 1   • midodrine (PROAMATINE) 2.5 MG tablet TAKE ONE TABLET BY MOUTH THREE TIMES DAILY 90 tablet 1   • Multiple Vitamins-Minerals (CENTRUM SILVER PO) Take  by mouth Daily.     • Needle, Disp, (BD  "DISP NEEDLES) 30G X 1/2\" misc To be used 3 times daily with Novolog Flexpen. 100 each 5   • nystatin (MYCOSTATIN) 271526 UNIT/GM powder Apply  topically to the appropriate area as directed 2 (Two) Times a Day. 60 g 2   • O2 (OXYGEN) Inhale 2 L/min Every Night. Uses 2L  overnight only     • omeprazole (priLOSEC) 40 MG capsule TAKE ONE CAPSULE BY MOUTH EVERY DAY 90 capsule 1   • ondansetron (ZOFRAN) 8 MG tablet Take 1 tablet by mouth Every 8 (Eight) Hours As Needed for Nausea or Vomiting. 15 tablet 0   • polyethylene glycol (MIRALAX) pack packet Take 17 g by mouth Daily.     • potassium chloride (K-DUR) 10 MEQ CR tablet TAKE ONE TABLET BY MOUTH EVERY DAY 90 tablet 2   • promethazine (PHENERGAN) 12.5 MG tablet TABLET ONE-HALF TABLET TO ONE TABLET BY MOUTH EVERY 6 HOURS AS NEEDED FOR NAUSEA 20 tablet 0   • sulfamethoxazole-trimethoprim (BACTRIM DS,SEPTRA DS) 800-160 MG per tablet Take 1 tablet by mouth 2 (Two) Times a Day for 5 days. 10 tablet 0   • TRESIBA FLEXTOUCH 200 UNIT/ML solution pen-injector pen injection INJECT 54 UNITS subcutaneously AT BEDTIME 9 mL 11   • ULTICARE MINI PEN NEEDLES 31G X 6 MM misc USE TO INJECT INSULINS 4 TIMES DAILY AS DIRECTED 400 each 3     Current Facility-Administered Medications on File Prior to Visit   Medication Dose Route Frequency Provider Last Rate Last Dose   • epoetin chu (EPOGEN,PROCRIT) 60,000 Units 2 mL injection  60,000 Units Subcutaneous Weekly Elieser Headley MD            ALLERGIES:    Allergies   Allergen Reactions   • Baclofen Anxiety     Panic attack, nightmares   • Codeine Itching and Rash   • Lisinopril Cough   • Morphine Hives   • Penicillins Rash     Tolerates cephalosporins         Social History     Socioeconomic History   • Marital status:      Spouse name: Not on file   • Number of children: 3   • Years of education: High School   • Highest education level: Not on file   Occupational History     Employer: RETIRED   Tobacco Use   • Smoking status: Never " "Smoker   • Smokeless tobacco: Never Used   • Tobacco comment: CAFFEINE USE: NONE   Substance and Sexual Activity   • Alcohol use: No   • Drug use: No   • Sexual activity: Defer     Comment: EXERCISE - RARELY        Family History   Problem Relation Age of Onset   • Lupus Mother    • Heart failure Mother 59   • Heart disease Other    • Hypertension Other    • Heart attack Father    • Breast cancer Neg Hx         Review of Systems   Constitutional: Positive for activity change and fatigue. Negative for appetite change, fever and unexpected weight change.   HENT: Negative for congestion.    Respiratory: Negative for apnea, cough and shortness of breath.    Gastrointestinal: Negative for abdominal pain, blood in stool, nausea and vomiting.   Genitourinary: Positive for dysuria. Negative for frequency and urgency.   Musculoskeletal: Positive for arthralgias, back pain and gait problem. Negative for neck stiffness.   Skin: Negative.    Neurological: Positive for numbness. Negative for dizziness, tremors, speech difficulty and light-headedness.   Hematological: Negative.    Psychiatric/Behavioral: Negative.        Objective     Vitals:    06/24/20 0928   BP: 135/66   Pulse: 82   Resp: 14   Temp: 97.3 °F (36.3 °C)   TempSrc: Tympanic   SpO2: 95%   Weight: 89.4 kg (197 lb)   Height: 154.9 cm (60.98\")   PainSc:   9   PainLoc: Arm  Comment: pain from possible UTI     Current Status 6/24/2020   ECOG score 1       Physical Exam    CON: pleasant well-developed somewhat chronic ill appearing woman  HEENT: no icterus, no thrush, moist membranes  NECK: no jvd  LYMPH: No cervical or supraclavicular lymphadenopathy  CV: RRR, S1S2, no murmur  RESP: Lungs clear to auscultation bilaterally, no wheezing noted.  MUSC: No edema noted.  Poor mobility, and a wheelchair  NEURO: alert and oriented x3, mild global weakness  PSYCH: normal mood and affect  Exam unchanged- 6/24/2020    RECENT LABS:  Hematology WBC   Date Value Ref Range Status "   06/24/2020 6.49 3.40 - 10.80 10*3/mm3 Final   04/23/2019 5.14 3.40 - 10.80 10*3/mm3 Final     RBC   Date Value Ref Range Status   06/24/2020 2.69 (L) 3.77 - 5.28 10*6/mm3 Final   04/23/2019 3.02 (L) 3.77 - 5.28 10*6/mm3 Final     Hemoglobin   Date Value Ref Range Status   06/24/2020 9.0 (L) 12.0 - 15.9 g/dL Final     Hematocrit   Date Value Ref Range Status   06/24/2020 29.0 (L) 34.0 - 46.6 % Final     Platelets   Date Value Ref Range Status   06/24/2020 400 140 - 450 10*3/mm3 Final      M spike/RANJANA asymmetrical gamma IgA lambda  Free light chain ratio 1.7  Ferritin 1300    Assessment/Plan     1.  Macrocytic anemia, worse:  BM biopsy 3/5/19 c/w myelodysplastic syndrome with deletion of 5 q.  In addition, the patient has chronic kidney disease, stage III.  She is currently doing okay on weekly Procrit with transfusion support required about once per month.  Hemoglobin is stable 9.0 today.  For now we will continue weekly CBC and Procrit.  She requires transfusion for hemoglobin 8.0 or less and receives Lasix between units of blood.  May consider initiating low-dose Revlimid after the COVID pandemic has improved.    2.  Monoclonal gammopathy of undetermined significance: The patient's bone marrow showed slight increase in plasma cells 8% of the cellularity but normal in morphology.  She has an IgA monoclonality on her immunofixation but no measurable M spike and a normal free light chain ratio.   Repeat studies 5/16/2019 showed a stable IgA 509 with a normal light chain ratio, no M spike.  Studies performed 10/24/2019 show stable IgA at 491, no M spike with mildly increased kappa/lambda light chain ratio related to her CKD.  Repeat studies 4/8/2020 were stable.  I will repeat SPEP, RANJANA and a free light chain ratio in 3 months.      3.  Chronic kidney disease, stage III which contributes to anemia    4.  Probable transfusional iron overload.  The patient has poor performance status but low-grade MDS and may benefit  from iron chelation but complicated by her CKD.  Ferritin and iron profile pending today.

## 2020-06-26 ENCOUNTER — TELEPHONE (OUTPATIENT)
Dept: INTERNAL MEDICINE | Facility: CLINIC | Age: 80
End: 2020-06-26

## 2020-06-30 ENCOUNTER — TELEPHONE (OUTPATIENT)
Dept: INTERNAL MEDICINE | Facility: CLINIC | Age: 80
End: 2020-06-30

## 2020-06-30 ENCOUNTER — LAB (OUTPATIENT)
Dept: LAB | Facility: HOSPITAL | Age: 80
End: 2020-06-30

## 2020-06-30 ENCOUNTER — INFUSION (OUTPATIENT)
Dept: ONCOLOGY | Facility: HOSPITAL | Age: 80
End: 2020-06-30

## 2020-06-30 DIAGNOSIS — F32.A ANXIETY AND DEPRESSION: ICD-10-CM

## 2020-06-30 DIAGNOSIS — M25.561 ACUTE PAIN OF RIGHT KNEE: ICD-10-CM

## 2020-06-30 DIAGNOSIS — E11.22 CKD STAGE 3 DUE TO TYPE 2 DIABETES MELLITUS (HCC): ICD-10-CM

## 2020-06-30 DIAGNOSIS — N18.30 CKD STAGE 3 DUE TO TYPE 2 DIABETES MELLITUS (HCC): ICD-10-CM

## 2020-06-30 DIAGNOSIS — D64.9 ANEMIA REQUIRING TRANSFUSIONS: Primary | ICD-10-CM

## 2020-06-30 DIAGNOSIS — F41.9 ANXIETY AND DEPRESSION: ICD-10-CM

## 2020-06-30 LAB
BASOPHILS # BLD AUTO: 0.14 10*3/MM3 (ref 0–0.2)
BASOPHILS NFR BLD AUTO: 3 % (ref 0–1.5)
DEPRECATED RDW RBC AUTO: 94 FL (ref 37–54)
EOSINOPHIL # BLD AUTO: 0.35 10*3/MM3 (ref 0–0.4)
EOSINOPHIL NFR BLD AUTO: 7.4 % (ref 0.3–6.2)
ERYTHROCYTE [DISTWIDTH] IN BLOOD BY AUTOMATED COUNT: 23.6 % (ref 12.3–15.4)
HCT VFR BLD AUTO: 26 % (ref 34–46.6)
HGB BLD-MCNC: 8.1 G/DL (ref 12–15.9)
IMM GRANULOCYTES # BLD AUTO: 0.02 10*3/MM3 (ref 0–0.05)
IMM GRANULOCYTES NFR BLD AUTO: 0.4 % (ref 0–0.5)
LYMPHOCYTES # BLD AUTO: 1.97 10*3/MM3 (ref 0.7–3.1)
LYMPHOCYTES NFR BLD AUTO: 41.7 % (ref 19.6–45.3)
MCH RBC QN AUTO: 34 PG (ref 26.6–33)
MCHC RBC AUTO-ENTMCNC: 31.2 G/DL (ref 31.5–35.7)
MCV RBC AUTO: 109.2 FL (ref 79–97)
MONOCYTES # BLD AUTO: 0.18 10*3/MM3 (ref 0.1–0.9)
MONOCYTES NFR BLD AUTO: 3.8 % (ref 5–12)
NEUTROPHILS NFR BLD AUTO: 2.06 10*3/MM3 (ref 1.7–7)
NEUTROPHILS NFR BLD AUTO: 43.7 % (ref 42.7–76)
PLATELET # BLD AUTO: 325 10*3/MM3 (ref 140–450)
PMV BLD AUTO: 11.5 FL (ref 6–12)
RBC # BLD AUTO: 2.38 10*6/MM3 (ref 3.77–5.28)
WBC # BLD AUTO: 4.72 10*3/MM3 (ref 3.4–10.8)

## 2020-06-30 PROCEDURE — 85025 COMPLETE CBC W/AUTO DIFF WBC: CPT | Performed by: INTERNAL MEDICINE

## 2020-06-30 PROCEDURE — 96372 THER/PROPH/DIAG INJ SC/IM: CPT

## 2020-06-30 PROCEDURE — 36415 COLL VENOUS BLD VENIPUNCTURE: CPT | Performed by: INTERNAL MEDICINE

## 2020-06-30 PROCEDURE — 25010000002 EPOETIN ALFA PER 1000 UNITS: Performed by: INTERNAL MEDICINE

## 2020-06-30 RX ORDER — GABAPENTIN 400 MG/1
CAPSULE ORAL
Qty: 120 CAPSULE | Refills: 0 | Status: SHIPPED | OUTPATIENT
Start: 2020-06-30 | End: 2020-07-31

## 2020-06-30 RX ORDER — APIXABAN 5 MG/1
TABLET, FILM COATED ORAL
Qty: 180 TABLET | Refills: 0 | Status: SHIPPED | OUTPATIENT
Start: 2020-06-30 | End: 2020-10-21

## 2020-06-30 RX ORDER — DESVENLAFAXINE SUCCINATE 50 MG/1
TABLET, EXTENDED RELEASE ORAL
Qty: 30 TABLET | Refills: 0 | Status: SHIPPED | OUTPATIENT
Start: 2020-06-30 | End: 2020-07-31

## 2020-06-30 RX ADMIN — ERYTHROPOIETIN 60000 UNITS: 40000 INJECTION, SOLUTION INTRAVENOUS; SUBCUTANEOUS at 10:11

## 2020-07-01 ENCOUNTER — APPOINTMENT (OUTPATIENT)
Dept: ONCOLOGY | Facility: HOSPITAL | Age: 80
End: 2020-07-01

## 2020-07-01 ENCOUNTER — APPOINTMENT (OUTPATIENT)
Dept: LAB | Facility: HOSPITAL | Age: 80
End: 2020-07-01

## 2020-07-02 NOTE — TELEPHONE ENCOUNTER
Verbal per Dr. Mercado, patient needs to be seen since she has been treated to many times without being seen,please schedule

## 2020-07-02 NOTE — TELEPHONE ENCOUNTER
LM for patient with pcp response requesting pt come in office for appt.  Offered 345 pm or 4pm appt today; otherwise, appointment next week.

## 2020-07-08 ENCOUNTER — LAB (OUTPATIENT)
Dept: LAB | Facility: HOSPITAL | Age: 80
End: 2020-07-08

## 2020-07-08 ENCOUNTER — INFUSION (OUTPATIENT)
Dept: ONCOLOGY | Facility: HOSPITAL | Age: 80
End: 2020-07-08

## 2020-07-08 VITALS
SYSTOLIC BLOOD PRESSURE: 94 MMHG | HEART RATE: 77 BPM | OXYGEN SATURATION: 97 % | TEMPERATURE: 97.7 F | DIASTOLIC BLOOD PRESSURE: 63 MMHG

## 2020-07-08 DIAGNOSIS — E11.22 CKD STAGE 3 DUE TO TYPE 2 DIABETES MELLITUS (HCC): ICD-10-CM

## 2020-07-08 DIAGNOSIS — D64.9 ANEMIA REQUIRING TRANSFUSIONS: Primary | ICD-10-CM

## 2020-07-08 DIAGNOSIS — D46.C MYELODYSPLASTIC SYNDROME WITH 5Q DELETION (HCC): ICD-10-CM

## 2020-07-08 DIAGNOSIS — N18.30 CKD STAGE 3 DUE TO TYPE 2 DIABETES MELLITUS (HCC): ICD-10-CM

## 2020-07-08 DIAGNOSIS — D64.9 ANEMIA REQUIRING TRANSFUSIONS: ICD-10-CM

## 2020-07-08 LAB
BASOPHILS # BLD AUTO: 0.17 10*3/MM3 (ref 0–0.2)
BASOPHILS NFR BLD AUTO: 3 % (ref 0–1.5)
DEPRECATED RDW RBC AUTO: 91.4 FL (ref 37–54)
EOSINOPHIL # BLD AUTO: 0.34 10*3/MM3 (ref 0–0.4)
EOSINOPHIL NFR BLD AUTO: 5.9 % (ref 0.3–6.2)
ERYTHROCYTE [DISTWIDTH] IN BLOOD BY AUTOMATED COUNT: 23.4 % (ref 12.3–15.4)
HCT VFR BLD AUTO: 27.5 % (ref 34–46.6)
HGB BLD-MCNC: 8.4 G/DL (ref 12–15.9)
IMM GRANULOCYTES # BLD AUTO: 0.04 10*3/MM3 (ref 0–0.05)
IMM GRANULOCYTES NFR BLD AUTO: 0.7 % (ref 0–0.5)
LYMPHOCYTES # BLD AUTO: 2.22 10*3/MM3 (ref 0.7–3.1)
LYMPHOCYTES NFR BLD AUTO: 38.7 % (ref 19.6–45.3)
MCH RBC QN AUTO: 33.5 PG (ref 26.6–33)
MCHC RBC AUTO-ENTMCNC: 30.5 G/DL (ref 31.5–35.7)
MCV RBC AUTO: 109.6 FL (ref 79–97)
MONOCYTES # BLD AUTO: 0.28 10*3/MM3 (ref 0.1–0.9)
MONOCYTES NFR BLD AUTO: 4.9 % (ref 5–12)
NEUTROPHILS NFR BLD AUTO: 2.68 10*3/MM3 (ref 1.7–7)
NEUTROPHILS NFR BLD AUTO: 46.8 % (ref 42.7–76)
PLATELET # BLD AUTO: 444 10*3/MM3 (ref 140–450)
PMV BLD AUTO: 11.1 FL (ref 6–12)
RBC # BLD AUTO: 2.51 10*6/MM3 (ref 3.77–5.28)
WBC # BLD AUTO: 5.73 10*3/MM3 (ref 3.4–10.8)

## 2020-07-08 PROCEDURE — 25010000002 EPOETIN ALFA PER 1000 UNITS: Performed by: INTERNAL MEDICINE

## 2020-07-08 PROCEDURE — 36415 COLL VENOUS BLD VENIPUNCTURE: CPT

## 2020-07-08 PROCEDURE — 96372 THER/PROPH/DIAG INJ SC/IM: CPT

## 2020-07-08 PROCEDURE — 85025 COMPLETE CBC W/AUTO DIFF WBC: CPT | Performed by: INTERNAL MEDICINE

## 2020-07-08 RX ADMIN — ERYTHROPOIETIN 60000 UNITS: 40000 INJECTION, SOLUTION INTRAVENOUS; SUBCUTANEOUS at 10:37

## 2020-07-15 ENCOUNTER — OFFICE VISIT (OUTPATIENT)
Dept: INTERNAL MEDICINE | Facility: CLINIC | Age: 80
End: 2020-07-15

## 2020-07-15 ENCOUNTER — INFUSION (OUTPATIENT)
Dept: ONCOLOGY | Facility: HOSPITAL | Age: 80
End: 2020-07-15

## 2020-07-15 ENCOUNTER — LAB (OUTPATIENT)
Dept: LAB | Facility: HOSPITAL | Age: 80
End: 2020-07-15

## 2020-07-15 VITALS
DIASTOLIC BLOOD PRESSURE: 55 MMHG | HEART RATE: 76 BPM | OXYGEN SATURATION: 97 % | TEMPERATURE: 97.4 F | SYSTOLIC BLOOD PRESSURE: 115 MMHG

## 2020-07-15 VITALS
DIASTOLIC BLOOD PRESSURE: 60 MMHG | SYSTOLIC BLOOD PRESSURE: 120 MMHG | HEART RATE: 74 BPM | WEIGHT: 197 LBS | OXYGEN SATURATION: 97 % | HEIGHT: 61 IN | BODY MASS INDEX: 37.19 KG/M2 | RESPIRATION RATE: 16 BRPM | TEMPERATURE: 98.4 F

## 2020-07-15 DIAGNOSIS — D46.C MYELODYSPLASTIC SYNDROME WITH 5Q DELETION (HCC): ICD-10-CM

## 2020-07-15 DIAGNOSIS — D64.9 ANEMIA REQUIRING TRANSFUSIONS: ICD-10-CM

## 2020-07-15 DIAGNOSIS — E11.22 CKD STAGE 3 DUE TO TYPE 2 DIABETES MELLITUS (HCC): ICD-10-CM

## 2020-07-15 DIAGNOSIS — Z00.00 MEDICARE ANNUAL WELLNESS VISIT, SUBSEQUENT: Primary | ICD-10-CM

## 2020-07-15 DIAGNOSIS — B37.31 VAGINAL YEAST INFECTION: ICD-10-CM

## 2020-07-15 DIAGNOSIS — I10 ESSENTIAL HYPERTENSION: ICD-10-CM

## 2020-07-15 DIAGNOSIS — N18.30 CKD STAGE 3 DUE TO TYPE 2 DIABETES MELLITUS (HCC): ICD-10-CM

## 2020-07-15 DIAGNOSIS — E03.9 ACQUIRED HYPOTHYROIDISM: ICD-10-CM

## 2020-07-15 DIAGNOSIS — N39.0 RECURRENT UTI: ICD-10-CM

## 2020-07-15 DIAGNOSIS — G47.33 OBSTRUCTIVE SLEEP APNEA SYNDROME: ICD-10-CM

## 2020-07-15 DIAGNOSIS — E11.65 UNCONTROLLED TYPE 2 DIABETES MELLITUS WITH HYPERGLYCEMIA (HCC): ICD-10-CM

## 2020-07-15 DIAGNOSIS — N18.30 CKD STAGE 3 DUE TO TYPE 2 DIABETES MELLITUS (HCC): Primary | ICD-10-CM

## 2020-07-15 DIAGNOSIS — E11.22 CKD STAGE 3 DUE TO TYPE 2 DIABETES MELLITUS (HCC): Primary | ICD-10-CM

## 2020-07-15 LAB
ABO GROUP BLD: NORMAL
BASOPHILS # BLD AUTO: 0.1 10*3/MM3 (ref 0–0.2)
BASOPHILS NFR BLD AUTO: 2.1 % (ref 0–1.5)
BILIRUB BLD-MCNC: NEGATIVE MG/DL
BLD GP AB SCN SERPL QL: NEGATIVE
CLARITY, POC: ABNORMAL
COLOR UR: YELLOW
DEPRECATED RDW RBC AUTO: 96.4 FL (ref 37–54)
EOSINOPHIL # BLD AUTO: 0.32 10*3/MM3 (ref 0–0.4)
EOSINOPHIL NFR BLD AUTO: 6.7 % (ref 0.3–6.2)
ERYTHROCYTE [DISTWIDTH] IN BLOOD BY AUTOMATED COUNT: 23.6 % (ref 12.3–15.4)
GLUCOSE UR STRIP-MCNC: NEGATIVE MG/DL
HCT VFR BLD AUTO: 26.6 % (ref 34–46.6)
HGB BLD-MCNC: 8.1 G/DL (ref 12–15.9)
IMM GRANULOCYTES # BLD AUTO: 0.04 10*3/MM3 (ref 0–0.05)
IMM GRANULOCYTES NFR BLD AUTO: 0.8 % (ref 0–0.5)
KETONES UR QL: NEGATIVE
LEUKOCYTE EST, POC: ABNORMAL
LYMPHOCYTES # BLD AUTO: 2.04 10*3/MM3 (ref 0.7–3.1)
LYMPHOCYTES NFR BLD AUTO: 42.6 % (ref 19.6–45.3)
MCH RBC QN AUTO: 34 PG (ref 26.6–33)
MCHC RBC AUTO-ENTMCNC: 30.5 G/DL (ref 31.5–35.7)
MCV RBC AUTO: 111.8 FL (ref 79–97)
MONOCYTES # BLD AUTO: 0.19 10*3/MM3 (ref 0.1–0.9)
MONOCYTES NFR BLD AUTO: 4 % (ref 5–12)
NEUTROPHILS NFR BLD AUTO: 2.1 10*3/MM3 (ref 1.7–7)
NEUTROPHILS NFR BLD AUTO: 43.8 % (ref 42.7–76)
NITRITE UR-MCNC: POSITIVE MG/ML
PH UR: 5 [PH] (ref 5–8)
PLATELET # BLD AUTO: 372 10*3/MM3 (ref 140–450)
PMV BLD AUTO: 11.6 FL (ref 6–12)
PROT UR STRIP-MCNC: NEGATIVE MG/DL
RBC # BLD AUTO: 2.38 10*6/MM3 (ref 3.77–5.28)
RBC # UR STRIP: ABNORMAL /UL
RH BLD: POSITIVE
SP GR UR: 1.01 (ref 1–1.03)
T&S EXPIRATION DATE: NORMAL
UROBILINOGEN UR QL: NORMAL
WBC # BLD AUTO: 4.79 10*3/MM3 (ref 3.4–10.8)

## 2020-07-15 PROCEDURE — 86901 BLOOD TYPING SEROLOGIC RH(D): CPT | Performed by: INTERNAL MEDICINE

## 2020-07-15 PROCEDURE — 96160 PT-FOCUSED HLTH RISK ASSMT: CPT | Performed by: INTERNAL MEDICINE

## 2020-07-15 PROCEDURE — 99214 OFFICE O/P EST MOD 30 MIN: CPT | Performed by: INTERNAL MEDICINE

## 2020-07-15 PROCEDURE — 86923 COMPATIBILITY TEST ELECTRIC: CPT

## 2020-07-15 PROCEDURE — 36415 COLL VENOUS BLD VENIPUNCTURE: CPT

## 2020-07-15 PROCEDURE — 96372 THER/PROPH/DIAG INJ SC/IM: CPT

## 2020-07-15 PROCEDURE — 85025 COMPLETE CBC W/AUTO DIFF WBC: CPT | Performed by: INTERNAL MEDICINE

## 2020-07-15 PROCEDURE — G0439 PPPS, SUBSEQ VISIT: HCPCS | Performed by: INTERNAL MEDICINE

## 2020-07-15 PROCEDURE — 86850 RBC ANTIBODY SCREEN: CPT | Performed by: INTERNAL MEDICINE

## 2020-07-15 PROCEDURE — 86900 BLOOD TYPING SEROLOGIC ABO: CPT | Performed by: INTERNAL MEDICINE

## 2020-07-15 PROCEDURE — 81003 URINALYSIS AUTO W/O SCOPE: CPT | Performed by: INTERNAL MEDICINE

## 2020-07-15 PROCEDURE — 25010000002 EPOETIN ALFA PER 1000 UNITS: Performed by: INTERNAL MEDICINE

## 2020-07-15 PROCEDURE — 99397 PER PM REEVAL EST PAT 65+ YR: CPT | Performed by: INTERNAL MEDICINE

## 2020-07-15 RX ORDER — ACETAMINOPHEN 325 MG/1
650 TABLET ORAL ONCE
Status: CANCELLED | OUTPATIENT
Start: 2020-07-15 | End: 2020-07-15

## 2020-07-15 RX ORDER — FLUCONAZOLE 150 MG/1
150 TABLET ORAL ONCE
Qty: 1 TABLET | Refills: 0 | Status: SHIPPED | OUTPATIENT
Start: 2020-07-15 | End: 2020-07-15

## 2020-07-15 RX ORDER — FUROSEMIDE 10 MG/ML
20 INJECTION INTRAMUSCULAR; INTRAVENOUS ONCE
Status: CANCELLED | OUTPATIENT
Start: 2020-07-15 | End: 2020-07-15

## 2020-07-15 RX ORDER — DIPHENHYDRAMINE HCL 25 MG
25 CAPSULE ORAL ONCE
Status: CANCELLED | OUTPATIENT
Start: 2020-07-15 | End: 2020-07-15

## 2020-07-15 RX ORDER — SODIUM CHLORIDE 9 MG/ML
250 INJECTION, SOLUTION INTRAVENOUS AS NEEDED
Status: CANCELLED | OUTPATIENT
Start: 2020-07-15

## 2020-07-15 RX ADMIN — ERYTHROPOIETIN 60000 UNITS: 40000 INJECTION, SOLUTION INTRAVENOUS; SUBCUTANEOUS at 10:45

## 2020-07-15 NOTE — PATIENT INSTRUCTIONS
"Call Dr. Elieser Headley, [facility] at [phone] if you have any problems or concerns.    We know you have a Choice in healthcare and appreciate you using [facility].  Our purpose is to provide you \"Excellent Care\".  We hope that you will always choose us in the future and continue to recommend us to your family and friends.      Blood Transfusion, Adult, Care After  This sheet gives you information about how to care for yourself after your procedure. Your health care provider may also give you more specific instructions. If you have problems or questions, contact your health care provider.  What can I expect after the procedure?  After your procedure, it is common to have:  · Bruising and soreness where the IV tube was inserted.  · Headache.  Follow these instructions at home:    · Take over-the-counter and prescription medicines only as told by your health care provider.  · Return to your normal activities as told by your health care provider.  · Follow instructions from your health care provider about how to take care of your IV insertion site. Make sure you:  ? Wash your hands with soap and water before you change your bandage (dressing). If soap and water are not available, use hand .  ? Change your dressing as told by your health care provider.  · Check your IV insertion site every day for signs of infection. Check for:  ? More redness, swelling, or pain.  ? More fluid or blood.  ? Warmth.  ? Pus or a bad smell.  Contact a health care provider if:  · You have more redness, swelling, or pain around the IV insertion site.  · You have more fluid or blood coming from the IV insertion site.  · Your IV insertion site feels warm to the touch.  · You have pus or a bad smell coming from the IV insertion site.  · Your urine turns pink, red, or brown.  · You feel weak after doing your normal activities.  Get help right away if:  · You have signs of a serious allergic or immune system reaction, " including:  ? Itchiness.  ? Hives.  ? Trouble breathing.  ? Anxiety.  ? Chest or lower back pain.  ? Fever, flushing, and chills.  ? Rapid pulse.  ? Rash.  ? Diarrhea.  ? Vomiting.  ? Dark urine.  ? Serious headache.  ? Dizziness.  ? Stiff neck.  ? Yellow coloration of the face or the white parts of the eyes (jaundice).  This information is not intended to replace advice given to you by your health care provider. Make sure you discuss any questions you have with your health care provider.  Document Released: 01/08/2016 Document Revised: 10/15/2018 Document Reviewed: 07/03/2017  Elsevier Patient Education © 2020 [employer]

## 2020-07-15 NOTE — PROGRESS NOTES
The ABCs of the Annual Wellness Visit  Subsequent Medicare Wellness Visit    Chief Complaint   Patient presents with   • Urinary Tract Infection   • Diabetes   • Medicare Wellness-subsequent       Subjective   History of Present Illness:  Joya Singh is a 80 y.o. female who presents for a Subsequent Medicare Wellness Visit.    HEALTH RISK ASSESSMENT    Recent Hospitalizations:  No hospitalization(s) within the last year.    Current Medical Providers:  Patient Care Team:  Chari Mercado MD as PCP - General  Chari Mercado MD as PCP - Family Medicine  Christos Rob MD as Surgeon (General Surgery)  German Wylie MD as Surgeon (Orthopedic Surgery)  Yasmani Baugh MD as Consulting Physician (Nephrology)  Yasmani Baugh MD as Referring Physician (Nephrology)  Elieser Headley MD as Consulting Physician (Hematology and Oncology)    Smoking Status:  Social History     Tobacco Use   Smoking Status Never Smoker   Smokeless Tobacco Never Used   Tobacco Comment    CAFFEINE USE: NONE       Alcohol Consumption:  Social History     Substance and Sexual Activity   Alcohol Use No       Depression Screen:   PHQ-2/PHQ-9 Depression Screening 7/15/2020   Little interest or pleasure in doing things 1   Feeling down, depressed, or hopeless 1   Trouble falling or staying asleep, or sleeping too much 0   Feeling tired or having little energy 1   Poor appetite or overeating 0   Feeling bad about yourself - or that you are a failure or have let yourself or your family down 0   Trouble concentrating on things, such as reading the newspaper or watching television 0   Moving or speaking so slowly that other people could have noticed. Or the opposite - being so fidgety or restless that you have been moving around a lot more than usual 0   Thoughts that you would be better off dead, or of hurting yourself in some way 0   Total Score 3   If you checked off any problems, how difficult have these problems  made it for you to do your work, take care of things at home, or get along with other people? Somewhat difficult       Fall Risk Screen:  NEERAJ Fall Risk Assessment was completed, and patient is at HIGH risk for falls. Assessment completed on:7/15/2020    Health Habits and Functional and Cognitive Screening:  Functional & Cognitive Status 7/15/2020   Do you have difficulty preparing food and eating? No   Do you have difficulty bathing yourself, getting dressed or grooming yourself? No   Do you have difficulty using the toilet? No   Do you have difficulty moving around from place to place? No   Do you have trouble with steps or getting out of a bed or a chair? No   Current Diet Unhealthy Diet   Dental Exam Not up to date   Eye Exam Not up to date   Exercise (times per week) 0 times per week   Current Exercise Activities Include None   Do you need help using the phone?  No   Are you deaf or do you have serious difficulty hearing?  No   Do you need help with transportation? Yes   Do you need help shopping? Yes   Do you need help preparing meals?  Yes   Do you need help with housework?  Yes   Do you need help with laundry? Yes   Do you need help taking your medications? No   Do you need help managing money? No   Do you ever drive or ride in a car without wearing a seat belt? No   Have you felt unusual stress, anger or loneliness in the last month? No   Who do you live with? Child   If you need help, do you have trouble finding someone available to you? No   Have you been bothered in the last four weeks by sexual problems? No   Do you have difficulty concentrating, remembering or making decisions? No         Does the patient have evidence of cognitive impairment? No    Asprin use counseling:on eliquis    Age-appropriate Screening Schedule:  Refer to the list below for future screening recommendations based on patient's age, sex and/or medical conditions. Orders for these recommended tests are listed in the plan section.  The patient has been provided with a written plan.    Health Maintenance   Topic Date Due   • DIABETIC EYE EXAM  01/30/2019   • LIPID PANEL  03/18/2020   • TDAP/TD VACCINES (1 - Tdap) 08/27/2020 (Originally 4/15/1951)   • INFLUENZA VACCINE  08/01/2020   • ZOSTER VACCINE (2 of 2) 08/05/2020   • URINE MICROALBUMIN  09/03/2020   • MAMMOGRAM  09/11/2021   • DXA SCAN  09/11/2021   • COLONOSCOPY  10/28/2028   • HEMOGLOBIN A1C  Discontinued          The following portions of the patient's history were reviewed and updated as appropriate: allergies, current medications, past family history, past medical history, past social history, past surgical history and problem list.    Outpatient Medications Prior to Visit   Medication Sig Dispense Refill   • ACCU-CHEK FASTCLIX LANCETS misc TEST 3-4 TIMES DAILY AS DIRECTED 400 each 3   • ACCU-CHEK SMARTVIEW test strip TEST BLOOD SUGAR THREE TIMES DAILY OR AS DIRECTED 300 each 3   • acetaminophen (TYLENOL) 325 MG tablet Take 2 tablets by mouth Every 6 (Six) Hours As Needed for Mild Pain . OTC product 40 tablet 0   • albuterol sulfate  (90 Base) MCG/ACT inhaler Inhale 2 puffs Every 4 (Four) Hours As Needed for Wheezing. 1 inhaler 3   • atorvastatin (LIPITOR) 10 MG tablet TAKE ONE TABLET BY MOUTH AT BEDTIME 90 tablet 1   • benzonatate (TESSALON) 200 MG capsule Take 1 capsule by mouth 3 (Three) Times a Day As Needed for Cough. 20 capsule 2   • Blood Glucose Monitoring Suppl (ACCU-CHEK KENNETH SMARTVIEW) w/Device kit TEST blood sugar three times daily or as directed 1 kit 0   • cholecalciferol 2000 units tablet Take 2,000 Units by mouth Daily.     • Continuous Blood Gluc  (FREESTYLE MATILDA 14 DAY READER) device 1 each 6 (Six) Times a Day. 1 Device 0   • Continuous Blood Gluc Sensor (FREESTYLE MATILDA 14 DAY SENSOR) misc 1 each 6 (Six) Times a Day. 2 each 11   • cyclobenzaprine (FLEXERIL) 10 MG tablet TAKE ONE TABLET BY MOUTH TWICE DAILY as needed for muscle spasms 30 tablet 0   •  "desvenlafaxine (PRISTIQ) 50 MG 24 hr tablet TAKE ONE TABLET BY MOUTH EVERY DAY 30 tablet 0   • diphenhydrAMINE (BENADRYL) 25 mg capsule Take 25 mg by mouth Every 6 (Six) Hours As Needed for Itching.     • ELIQUIS 5 MG tablet tablet TAKE ONE TABLET BY MOUTH TWICE DAILY 180 tablet 0   • epoetin chu 49657 UNIT/ML solution 20,000 Units, epoetin chu 23138 UNIT/ML solution 40,000 Units Inject 60,000 Units under the skin into the appropriate area as directed 1 (One) Time Per Week.     • furosemide (LASIX) 20 MG tablet TAKE ONE (1) TABLET ORALLY (BY MOUTH) ONCE DAILY 90 tablet 1   • gabapentin (NEURONTIN) 400 MG capsule TAKE ONE CAPSULE BY MOUTH EVERY MORNING, AT NOON, AND TWO AT BEDTIME 120 capsule 0   • HYDROcodone-acetaminophen (NORCO) 5-325 MG per tablet Take 1 tablet by mouth Every 6 (Six) Hours As Needed for Moderate Pain . 60 tablet 0   • insulin aspart (novoLOG) 100 UNIT/ML injection Inject 5 Units under the skin into the appropriate area as directed 3 (Three) Times a Day With Meals.  12   • levothyroxine (SYNTHROID, LEVOTHROID) 88 MCG tablet TAKE ONE TABLET BY MOUTH EVERY DAY 90 tablet 1   • midodrine (PROAMATINE) 2.5 MG tablet TAKE ONE TABLET BY MOUTH THREE TIMES DAILY 90 tablet 1   • Multiple Vitamins-Minerals (CENTRUM SILVER PO) Take  by mouth Daily.     • Needle, Disp, (BD DISP NEEDLES) 30G X 1/2\" misc To be used 3 times daily with Novolog Flexpen. 100 each 5   • nystatin (MYCOSTATIN) 399041 UNIT/GM powder Apply  topically to the appropriate area as directed 2 (Two) Times a Day. 60 g 2   • O2 (OXYGEN) Inhale 2 L/min Every Night. Uses 2L  overnight only     • omeprazole (priLOSEC) 40 MG capsule TAKE ONE CAPSULE BY MOUTH EVERY DAY 90 capsule 1   • ondansetron (ZOFRAN) 8 MG tablet Take 1 tablet by mouth Every 8 (Eight) Hours As Needed for Nausea or Vomiting. 15 tablet 0   • polyethylene glycol (MIRALAX) pack packet Take 17 g by mouth Daily.     • potassium chloride (K-DUR) 10 MEQ CR tablet TAKE ONE TABLET BY " MOUTH EVERY DAY 90 tablet 2   • promethazine (PHENERGAN) 12.5 MG tablet TABLET ONE-HALF TABLET TO ONE TABLET BY MOUTH EVERY 6 HOURS AS NEEDED FOR NAUSEA 20 tablet 0   • TRESIBA FLEXTOUCH 200 UNIT/ML solution pen-injector pen injection INJECT 54 UNITS subcutaneously AT BEDTIME 9 mL 11   • ULTICARE MINI PEN NEEDLES 31G X 6 MM misc USE TO INJECT INSULINS 4 TIMES DAILY AS DIRECTED 400 each 3     No facility-administered medications prior to visit.        Patient Active Problem List   Diagnosis   • Deep vein thrombosis of lower extremity (CMS/[facility])   • Arthritis   • Chronic back pain   • Chronic anemia   • Type 2 diabetes mellitus with neurologic complication (CMS/[facility])   • Brittle diabetes mellitus (CMS/[facility])   • Hyperlipidemia   • Hypertension   • Insomnia with sleep apnea   • Obstructive sleep apnea syndrome   • Peripheral neuropathy   • Diabetic gastroparesis (CMS/[facility])   • Primary localized osteoarthrosis of left shoulder region   • Rotator cuff tendonitis   • Acquired hypothyroidism   • Anxiety   • Gastroesophageal reflux disease   • History of biliary T-tube placement   • CKD stage 3 due to type 2 diabetes mellitus (CMS/[facility])   • Complex tear of medial meniscus of right knee as current injury   • Insufficiency fracture of tibia   • Primary osteoarthritis of left knee   • Orthostatic hypotension   • Tendinitis of right rotator cuff   • AC joint arthropathy   • Subacromial impingement of right shoulder   • Right carpal tunnel syndrome   • Anemia requiring transfusions   • Monoclonal gammopathy of unknown significance (MGUS)   • Myelodysplastic syndrome with 5q deletion (CMS/[facility])   • History of deep venous thrombosis (DVT) of distal vein of left lower extremity   • Moderate episode of recurrent major depressive disorder (CMS/[facility])   • Chronic diastolic CHF (congestive heart failure) (CMS/[facility])   • Knee pain   • Primary osteoarthritis of right knee   • Morbidly obese (CMS/[facility])   • Iron overload, transfusional       Advanced  "Care Planning:  ACP discussion was held with the patient during this visit. Patient has an advance directive in EMR which is still valid.     Review of Systems   Constitutional: Negative.    HENT: Negative.    Eyes: Negative.    Respiratory: Negative.    Cardiovascular: Negative.    Gastrointestinal: Negative.    Endocrine: Negative.    Genitourinary: Positive for dysuria.   Musculoskeletal: Negative.    Skin: Negative.    Allergic/Immunologic: Negative.    Neurological: Negative.    Hematological: Negative.    Psychiatric/Behavioral: Negative.    All other systems reviewed and are negative.      Compared to one year ago, the patient feels her physical health is worse.  Compared to one year ago, the patient feels her mental health is the same.    Reviewed chart for potential of high risk medication in the elderly: yes  Reviewed chart for potential of harmful drug interactions in the elderly:yes    Objective         Vitals:    07/15/20 1158   BP: 120/60   BP Location: Right arm   Patient Position: Sitting   Cuff Size: Adult   Pulse: 74   Resp: 16   Temp: 98.4 °F (36.9 °C)   TempSrc: Oral   SpO2: 97%   Weight: 89.4 kg (197 lb)   Height: 154.9 cm (61\")   PainSc: 8  Comment: Left Shoulder and Right Leg       Body mass index is 37.22 kg/m².  Discussed the patient's BMI with her. The BMI is above average; no BMI management plan is appropriate..    Physical Exam   Constitutional: She is oriented to person, place, and time. She appears well-developed and well-nourished. No distress.   HENT:   Head: Normocephalic and atraumatic.   Right Ear: External ear normal.   Left Ear: External ear normal.   Nose: Nose normal.   Mouth/Throat: Oropharynx is clear and moist.   Eyes: Pupils are equal, round, and reactive to light. Conjunctivae and EOM are normal.   Neck: Normal range of motion. Neck supple.   Cardiovascular: Normal rate, regular rhythm, normal heart sounds and intact distal pulses.   Pulmonary/Chest: Effort normal and " breath sounds normal. No respiratory distress. She has no wheezes.   Musculoskeletal: She exhibits no edema.   In wheelchair   Neurological: She is alert and oriented to person, place, and time.   Skin: Skin is warm and dry.   Psychiatric: She has a normal mood and affect. Her behavior is normal. Judgment and thought content normal.   Nursing note and vitals reviewed.            Assessment/Plan   Medicare Risks and Personalized Health Plan  CMS Preventative Services Quick Reference  Advance Directive Discussion  Immunizations Discussed/Encouraged (specific immunizations; Shingrix )  Obesity/Overweight   Osteoprorosis Risk  Polypharmacy    The above risks/problems have been discussed with the patient.  Pertinent information has been shared with the patient in the After Visit Summary.  Follow up plans and orders are seen below in the Assessment/Plan Section.    Diagnoses and all orders for this visit:    1. Medicare annual wellness visit, subsequent (Primary)    2. Obstructive sleep apnea syndrome    3. Recurrent UTI  -     Urine Culture - Urine, Urine, Clean Catch  -     POC Urinalysis Dipstick, Automated    4. Uncontrolled type 2 diabetes mellitus with hyperglycemia (CMS/[facility])  -     Comprehensive Metabolic Panel  -     T4, Free  -     TSH  -     Lipid Panel With LDL / HDL Ratio    5. Vaginal yeast infection  -     fluconazole (Diflucan) 150 MG tablet; Take 1 tablet by mouth 1 (One) Time for 1 dose.  Dispense: 1 tablet; Refill: 0    6. Acquired hypothyroidism  -     T4, Free  -     TSH    7. Essential hypertension  -     Comprehensive Metabolic Panel  -     T4, Free  -     TSH    8. Myelodysplastic syndrome with 5q deletion (CMS/[facility])      Follow Up:  Return in about 3 months (around 10/15/2020) for Recheck, labs.     An After Visit Summary and PPPS were given to the patient.

## 2020-07-15 NOTE — PROGRESS NOTES
Joya Singh is a 80 y.o. female, who presents with a chief complaint of   Chief Complaint   Patient presents with   • Urinary Tract Infection   • Diabetes   • Medicare Wellness-subsequent       HPI   Pt here for follow up.  LOV 2/2020     Myelodysplastic syndrome - pt following with dr. Headley.  She is currently needing blood transfusions monthly and has been  on weekly procrit.  She gets blood transfusions for hgb 8.0 or less with lasix between units of blood.  She has lost about 13 pounds from the beginning of the year.    UTI - She is on abx for her recent UTI and yeast infections.  She feels better overall but still has urinary frequency.  She is wearing depends.      htn - currently well controlled. No ha.       hypothyroidism - due for labs    T2DM -  No recent a1c checked bc pt has has so many recent blood transfusions.  Pt reports that most am fasting glucoses have been in the 200's.  No recent hypoglycemia.  She is no tresiba 54 units.  She is not taking her mealtime insulin.  She has the novolog but says she has been lazy.  She says she just doesn't feel good and hasn't taken all of her doses.  She says she really only has an appetite for sweets.       Chronic shoulder pain - pt wants to see ortho for a steroid injection in her shoulder.  I advised her that she has to get her glucoses down before getting steroids.     Sleep apnea - Pt wears oxygen nightly at 2L. She was intolerant of a cpap.  She has to sleep in a recliner bc she can't breathe well laying down.  She gets her O2 through [facility].       The following portions of the patient's history were reviewed and updated as appropriate: allergies, current medications, past family history, past medical history, past social history, past surgical history and problem list.    Allergies: Baclofen; Codeine; Lisinopril; Morphine; and Penicillins    Review of Systems   Constitutional: Negative.    HENT: Negative.    Eyes: Negative.    Respiratory:  Negative.    Cardiovascular: Negative.    Gastrointestinal: Negative.    Endocrine: Negative.    Genitourinary: Positive for dysuria.   Musculoskeletal: Positive for back pain.        Shoulder pain   Skin: Negative.    Allergic/Immunologic: Negative.    Neurological: Negative.    Hematological: Negative.    Psychiatric/Behavioral: Negative.    All other systems reviewed and are negative.            Wt Readings from Last 3 Encounters:   07/15/20 89.4 kg (197 lb)   06/24/20 89.4 kg (197 lb)   05/14/20 90.7 kg (200 lb)     Temp Readings from Last 3 Encounters:   07/15/20 98.4 °F (36.9 °C) (Oral)   07/15/20 97.4 °F (36.3 °C)   07/08/20 97.7 °F (36.5 °C)     BP Readings from Last 3 Encounters:   07/15/20 120/60   07/15/20 115/55   07/08/20 94/63     Pulse Readings from Last 3 Encounters:   07/15/20 74   07/15/20 76   07/08/20 77     Body mass index is 37.22 kg/m².  @LASTSAO2(3)@    Physical Exam   Constitutional: She is oriented to person, place, and time. She appears well-developed and well-nourished. No distress.   HENT:   Head: Normocephalic and atraumatic.   Right Ear: External ear normal.   Left Ear: External ear normal.   Nose: Nose normal.   Mouth/Throat: Oropharynx is clear and moist.   Eyes: Pupils are equal, round, and reactive to light. Conjunctivae and EOM are normal.   Neck: Normal range of motion. Neck supple.   Cardiovascular: Normal rate, regular rhythm, normal heart sounds and intact distal pulses.   Pulmonary/Chest: Effort normal and breath sounds normal. No respiratory distress. She has no wheezes.   Musculoskeletal: She exhibits no edema.   In wheelchair   Neurological: She is alert and oriented to person, place, and time.   Skin: Skin is warm and dry.   Psychiatric: She has a normal mood and affect. Her behavior is normal. Judgment and thought content normal.   Nursing note and vitals reviewed.      Results for orders placed or performed in visit on 07/15/20   CBC Auto Differential   Result Value Ref  Range    WBC 4.79 3.40 - 10.80 10*3/mm3    RBC 2.38 (L) 3.77 - 5.28 10*6/mm3    Hemoglobin 8.1 (L) 12.0 - 15.9 g/dL    Hematocrit 26.6 (L) 34.0 - 46.6 %    .8 (H) 79.0 - 97.0 fL    MCH 34.0 (H) 26.6 - 33.0 pg    MCHC 30.5 (L) 31.5 - 35.7 g/dL    RDW 23.6 (H) 12.3 - 15.4 %    RDW-SD 96.4 (H) 37.0 - 54.0 fl    MPV 11.6 6.0 - 12.0 fL    Platelets 372 140 - 450 10*3/mm3    Neutrophil % 43.8 42.7 - 76.0 %    Lymphocyte % 42.6 19.6 - 45.3 %    Monocyte % 4.0 (L) 5.0 - 12.0 %    Eosinophil % 6.7 (H) 0.3 - 6.2 %    Basophil % 2.1 (H) 0.0 - 1.5 %    Immature Grans % 0.8 (H) 0.0 - 0.5 %    Neutrophils, Absolute 2.10 1.70 - 7.00 10*3/mm3    Lymphocytes, Absolute 2.04 0.70 - 3.10 10*3/mm3    Monocytes, Absolute 0.19 0.10 - 0.90 10*3/mm3    Eosinophils, Absolute 0.32 0.00 - 0.40 10*3/mm3    Basophils, Absolute 0.10 0.00 - 0.20 10*3/mm3    Immature Grans, Absolute 0.04 0.00 - 0.05 10*3/mm3   Type and screen   Result Value Ref Range    ABO Type B     RH type Positive     Antibody Screen Negative     T&S Expiration Date 7/18/2020 11:59:59 PM      *Note: Due to a large number of results and/or encounters for the requested time period, some results have not been displayed. A complete set of results can be found in Results Review.           Joya was seen today for urinary tract infection, diabetes and medicare wellness-subsequent.    Diagnoses and all orders for this visit:    Medicare annual wellness visit, subsequent    Obstructive sleep apnea syndrome    Recurrent UTI  -     Urine Culture - Urine, Urine, Clean Catch  -     POC Urinalysis Dipstick, Automated    Uncontrolled type 2 diabetes mellitus with hyperglycemia (CMS/HCC)  -     Comprehensive Metabolic Panel  -     T4, Free  -     TSH  -     Lipid Panel With LDL / HDL Ratio    Vaginal yeast infection  -     fluconazole (Diflucan) 150 MG tablet; Take 1 tablet by mouth 1 (One) Time for 1 dose.    Acquired hypothyroidism  -     T4, Free  -     TSH    Essential  hypertension  -     Comprehensive Metabolic Panel  -     T4, Free  -     TSH    Myelodysplastic syndrome with 5q deletion (CMS/HCC)          Outpatient Medications Prior to Visit   Medication Sig Dispense Refill   • ACCU-CHEK FASTCLIX LANCETS misc TEST 3-4 TIMES DAILY AS DIRECTED 400 each 3   • ACCU-CHEK SMARTVIEW test strip TEST BLOOD SUGAR THREE TIMES DAILY OR AS DIRECTED 300 each 3   • acetaminophen (TYLENOL) 325 MG tablet Take 2 tablets by mouth Every 6 (Six) Hours As Needed for Mild Pain . OTC product 40 tablet 0   • albuterol sulfate  (90 Base) MCG/ACT inhaler Inhale 2 puffs Every 4 (Four) Hours As Needed for Wheezing. 1 inhaler 3   • atorvastatin (LIPITOR) 10 MG tablet TAKE ONE TABLET BY MOUTH AT BEDTIME 90 tablet 1   • benzonatate (TESSALON) 200 MG capsule Take 1 capsule by mouth 3 (Three) Times a Day As Needed for Cough. 20 capsule 2   • Blood Glucose Monitoring Suppl (ACCU-CHEK KENNETH SMARTVIEW) w/Device kit TEST blood sugar three times daily or as directed 1 kit 0   • cholecalciferol 2000 units tablet Take 2,000 Units by mouth Daily.     • Continuous Blood Gluc  (FREESTYLE MATILDA 14 DAY READER) device 1 each 6 (Six) Times a Day. 1 Device 0   • Continuous Blood Gluc Sensor (FREESTYLE MATILDA 14 DAY SENSOR) misc 1 each 6 (Six) Times a Day. 2 each 11   • cyclobenzaprine (FLEXERIL) 10 MG tablet TAKE ONE TABLET BY MOUTH TWICE DAILY as needed for muscle spasms 30 tablet 0   • desvenlafaxine (PRISTIQ) 50 MG 24 hr tablet TAKE ONE TABLET BY MOUTH EVERY DAY 30 tablet 0   • diphenhydrAMINE (BENADRYL) 25 mg capsule Take 25 mg by mouth Every 6 (Six) Hours As Needed for Itching.     • ELIQUIS 5 MG tablet tablet TAKE ONE TABLET BY MOUTH TWICE DAILY 180 tablet 0   • epoetin chu 80287 UNIT/ML solution 20,000 Units, epoetin chu 75809 UNIT/ML solution 40,000 Units Inject 60,000 Units under the skin into the appropriate area as directed 1 (One) Time Per Week.     • furosemide (LASIX) 20 MG tablet TAKE ONE (1)  "TABLET ORALLY (BY MOUTH) ONCE DAILY 90 tablet 1   • gabapentin (NEURONTIN) 400 MG capsule TAKE ONE CAPSULE BY MOUTH EVERY MORNING, AT NOON, AND TWO AT BEDTIME 120 capsule 0   • HYDROcodone-acetaminophen (NORCO) 5-325 MG per tablet Take 1 tablet by mouth Every 6 (Six) Hours As Needed for Moderate Pain . 60 tablet 0   • insulin aspart (novoLOG) 100 UNIT/ML injection Inject 5 Units under the skin into the appropriate area as directed 3 (Three) Times a Day With Meals.  12   • levothyroxine (SYNTHROID, LEVOTHROID) 88 MCG tablet TAKE ONE TABLET BY MOUTH EVERY DAY 90 tablet 1   • midodrine (PROAMATINE) 2.5 MG tablet TAKE ONE TABLET BY MOUTH THREE TIMES DAILY 90 tablet 1   • Multiple Vitamins-Minerals (CENTRUM SILVER PO) Take  by mouth Daily.     • Needle, Disp, (BD DISP NEEDLES) 30G X 1/2\" misc To be used 3 times daily with Novolog Flexpen. 100 each 5   • nystatin (MYCOSTATIN) 189683 UNIT/GM powder Apply  topically to the appropriate area as directed 2 (Two) Times a Day. 60 g 2   • O2 (OXYGEN) Inhale 2 L/min Every Night. Uses 2L  overnight only     • omeprazole (priLOSEC) 40 MG capsule TAKE ONE CAPSULE BY MOUTH EVERY DAY 90 capsule 1   • ondansetron (ZOFRAN) 8 MG tablet Take 1 tablet by mouth Every 8 (Eight) Hours As Needed for Nausea or Vomiting. 15 tablet 0   • polyethylene glycol (MIRALAX) pack packet Take 17 g by mouth Daily.     • potassium chloride (K-DUR) 10 MEQ CR tablet TAKE ONE TABLET BY MOUTH EVERY DAY 90 tablet 2   • promethazine (PHENERGAN) 12.5 MG tablet TABLET ONE-HALF TABLET TO ONE TABLET BY MOUTH EVERY 6 HOURS AS NEEDED FOR NAUSEA 20 tablet 0   • TRESIBA FLEXTOUCH 200 UNIT/ML solution pen-injector pen injection INJECT 54 UNITS subcutaneously AT BEDTIME 9 mL 11   • ULTICARE MINI PEN NEEDLES 31G X 6 MM misc USE TO INJECT INSULINS 4 TIMES DAILY AS DIRECTED 400 each 3     No facility-administered medications prior to visit.      New Medications Ordered This Visit   Medications   • fluconazole (Diflucan) 150 MG " tablet     Sig: Take 1 tablet by mouth 1 (One) Time for 1 dose.     Dispense:  1 tablet     Refill:  0     [unfilled]  There are no discontinued medications.      Return in about 3 months (around 10/15/2020) for Recheck, labs.

## 2020-07-15 NOTE — NURSING NOTE
HGB 8.1 today.  PT feeling dizzy which she c/o when her HGB low.  Per Dr Headley, 2 units PRBC'S ordered.  Tylenol and benadryl as premed with lasix 20 IV between units. T&S drawn and blood bank armband placed on patient.  PT has appointment Friday 7/17 at 9 am at [facility]

## 2020-07-15 NOTE — PATIENT INSTRUCTIONS
Medicare Wellness  Personal Prevention Plan of Service     Date of Office Visit:  07/15/2020  Encounter Provider:  Chari Mercado MD  Place of Service:  [facility]  Patient Name: Joya Singh  :  1940    As part of the Medicare Wellness portion of your visit today, we are providing you with this personalized preventive plan of services (PPPS). This plan is based upon recommendations of the [place] Preventive Services Task Force (USPSTF) and the Advisory Committee on Immunization Practices (ACIP).    This lists the preventive care services that should be considered, and provides dates of when you are due. Items listed as completed are up-to-date and do not require any further intervention.    Health Maintenance   Topic Date Due   • HEPATITIS C SCREENING  02/10/2016   • DIABETIC EYE EXAM  2019   • LIPID PANEL  2020   • TDAP/TD VACCINES (1 - Tdap) 2020 (Originally 4/15/1951)   • INFLUENZA VACCINE  2020   • ZOSTER VACCINE (2 of 2) 2020   • MEDICARE ANNUAL WELLNESS  2020   • URINE MICROALBUMIN  2020   • MAMMOGRAM  2021   • DXA SCAN  2021   • COLONOSCOPY  10/28/2028   • Pneumococcal Vaccine Once at 65 Years Old  Completed   • HEMOGLOBIN A1C  Discontinued       Orders Placed This Encounter   Procedures   • Urine Culture - Urine, Urine, Clean Catch   • Comprehensive Metabolic Panel   • T4, Free   • TSH   • Lipid Panel With LDL / HDL Ratio   • POC Urinalysis Dipstick, Automated       No follow-ups on file.

## 2020-07-17 ENCOUNTER — HOSPITAL ENCOUNTER (OUTPATIENT)
Dept: INFUSION THERAPY | Facility: HOSPITAL | Age: 80
Discharge: HOME | End: 2020-07-17
Admitting: INTERNAL MEDICINE

## 2020-07-17 DIAGNOSIS — N18.30 CKD STAGE 3 DUE TO TYPE 2 DIABETES MELLITUS (HCC): ICD-10-CM

## 2020-07-17 DIAGNOSIS — E11.22 CKD STAGE 3 DUE TO TYPE 2 DIABETES MELLITUS (HCC): ICD-10-CM

## 2020-07-17 DIAGNOSIS — D64.9 ANEMIA REQUIRING TRANSFUSIONS: Primary | ICD-10-CM

## 2020-07-17 LAB
ALBUMIN SERPL-MCNC: 4.1 G/DL (ref 3.7–4.7)
ALBUMIN/GLOB SERPL: 1.4 {RATIO} (ref 1.2–2.2)
ALP SERPL-CCNC: 110 IU/L (ref 39–117)
ALT SERPL-CCNC: 12 IU/L (ref 0–32)
AST SERPL-CCNC: 11 IU/L (ref 0–40)
BACTERIA UR CULT: ABNORMAL
BACTERIA UR CULT: ABNORMAL
BILIRUB SERPL-MCNC: 0.5 MG/DL (ref 0–1.2)
BUN SERPL-MCNC: 27 MG/DL (ref 8–27)
BUN/CREAT SERPL: 14 (ref 12–28)
CALCIUM SERPL-MCNC: 8.8 MG/DL (ref 8.7–10.3)
CHLORIDE SERPL-SCNC: 103 MMOL/L (ref 96–106)
CHOLEST SERPL-MCNC: 86 MG/DL (ref 100–199)
CO2 SERPL-SCNC: 24 MMOL/L (ref 20–29)
CREAT SERPL-MCNC: 1.97 MG/DL (ref 0.57–1)
GLOBULIN SER CALC-MCNC: 2.9 G/DL (ref 1.5–4.5)
GLUCOSE SERPL-MCNC: 179 MG/DL (ref 65–99)
HDLC SERPL-MCNC: 41 MG/DL
LDLC SERPL CALC-MCNC: 27 MG/DL (ref 0–99)
LDLC/HDLC SERPL: 0.7 RATIO (ref 0–3.2)
OTHER ANTIBIOTIC SUSC ISLT: ABNORMAL
POTASSIUM SERPL-SCNC: 5 MMOL/L (ref 3.5–5.2)
PROT SERPL-MCNC: 7 G/DL (ref 6–8.5)
SODIUM SERPL-SCNC: 143 MMOL/L (ref 134–144)
T4 FREE SERPL-MCNC: 1.5 NG/DL (ref 0.82–1.77)
TRIGL SERPL-MCNC: 91 MG/DL (ref 0–149)
TSH SERPL DL<=0.005 MIU/L-ACNC: 2.58 UIU/ML (ref 0.45–4.5)
VLDLC SERPL CALC-MCNC: 18 MG/DL (ref 5–40)

## 2020-07-17 PROCEDURE — 63710000001 ACETAMINOPHEN 325 MG TABLET: Performed by: INTERNAL MEDICINE

## 2020-07-17 PROCEDURE — A9270 NON-COVERED ITEM OR SERVICE: HCPCS | Performed by: INTERNAL MEDICINE

## 2020-07-17 PROCEDURE — 96374 THER/PROPH/DIAG INJ IV PUSH: CPT

## 2020-07-17 PROCEDURE — 86900 BLOOD TYPING SEROLOGIC ABO: CPT

## 2020-07-17 PROCEDURE — P9016 RBC LEUKOCYTES REDUCED: HCPCS

## 2020-07-17 PROCEDURE — 36430 TRANSFUSION BLD/BLD COMPNT: CPT

## 2020-07-17 PROCEDURE — 63710000001 DIPHENHYDRAMINE PER 50 MG: Performed by: INTERNAL MEDICINE

## 2020-07-17 PROCEDURE — 25010000002 FUROSEMIDE PER 20 MG: Performed by: INTERNAL MEDICINE

## 2020-07-17 RX ORDER — ACETAMINOPHEN 325 MG/1
650 TABLET ORAL ONCE
Status: COMPLETED | OUTPATIENT
Start: 2020-07-17 | End: 2020-07-17

## 2020-07-17 RX ORDER — FUROSEMIDE 10 MG/ML
20 INJECTION INTRAMUSCULAR; INTRAVENOUS ONCE
Status: COMPLETED | OUTPATIENT
Start: 2020-07-17 | End: 2020-07-17

## 2020-07-17 RX ORDER — SODIUM CHLORIDE 9 MG/ML
250 INJECTION, SOLUTION INTRAVENOUS AS NEEDED
Status: DISCONTINUED | OUTPATIENT
Start: 2020-07-17 | End: 2020-07-19 | Stop reason: HOSPADM

## 2020-07-17 RX ORDER — SODIUM CHLORIDE 9 MG/ML
INJECTION, SOLUTION INTRAVENOUS
Status: COMPLETED
Start: 2020-07-17 | End: 2020-07-17

## 2020-07-17 RX ORDER — DIPHENHYDRAMINE HCL 25 MG
25 CAPSULE ORAL ONCE
Status: COMPLETED | OUTPATIENT
Start: 2020-07-17 | End: 2020-07-17

## 2020-07-17 RX ADMIN — FUROSEMIDE 20 MG: 40 INJECTION, SOLUTION INTRAMUSCULAR; INTRAVENOUS at 13:05

## 2020-07-17 RX ADMIN — SODIUM CHLORIDE 250 ML: 9 INJECTION, SOLUTION INTRAVENOUS at 12:58

## 2020-07-17 RX ADMIN — DIPHENHYDRAMINE HYDROCHLORIDE 25 MG: 25 CAPSULE ORAL at 09:28

## 2020-07-17 RX ADMIN — ACETAMINOPHEN 650 MG: 325 TABLET, FILM COATED ORAL at 09:28

## 2020-07-17 NOTE — NURSING NOTE
0900 TO [facility] VIA W/C FOR SCHEDULED BLOOD TRANSFUSION, 2 UNITS PRCB'S GIVEN WITHOUT COMPLICATION. 1600 DISCHARGED VIA W/C WITHOUT C/O.

## 2020-07-18 LAB
BH BB BLOOD EXPIRATION DATE: NORMAL
BH BB BLOOD EXPIRATION DATE: NORMAL
BH BB BLOOD TYPE BARCODE: 5100
BH BB BLOOD TYPE BARCODE: 7300
BH BB DISPENSE STATUS: NORMAL
BH BB DISPENSE STATUS: NORMAL
BH BB PRODUCT CODE: NORMAL
BH BB PRODUCT CODE: NORMAL
BH BB UNIT NUMBER: NORMAL
BH BB UNIT NUMBER: NORMAL
CROSSMATCH INTERPRETATION: NORMAL
CROSSMATCH INTERPRETATION: NORMAL
UNIT  ABO: NORMAL
UNIT  ABO: NORMAL
UNIT  RH: NORMAL
UNIT  RH: NORMAL

## 2020-07-22 ENCOUNTER — LAB (OUTPATIENT)
Dept: LAB | Facility: HOSPITAL | Age: 80
End: 2020-07-22

## 2020-07-22 ENCOUNTER — INFUSION (OUTPATIENT)
Dept: ONCOLOGY | Facility: HOSPITAL | Age: 80
End: 2020-07-22

## 2020-07-22 DIAGNOSIS — D46.C MYELODYSPLASTIC SYNDROME WITH 5Q DELETION (HCC): ICD-10-CM

## 2020-07-22 DIAGNOSIS — D64.9 ANEMIA REQUIRING TRANSFUSIONS: ICD-10-CM

## 2020-07-22 LAB
BASOPHILS # BLD AUTO: 0.12 10*3/MM3 (ref 0–0.2)
BASOPHILS NFR BLD AUTO: 1.7 % (ref 0–1.5)
DEPRECATED RDW RBC AUTO: 79.9 FL (ref 37–54)
EOSINOPHIL # BLD AUTO: 0.42 10*3/MM3 (ref 0–0.4)
EOSINOPHIL NFR BLD AUTO: 6.1 % (ref 0.3–6.2)
ERYTHROCYTE [DISTWIDTH] IN BLOOD BY AUTOMATED COUNT: 22.3 % (ref 12.3–15.4)
HCT VFR BLD AUTO: 33.6 % (ref 34–46.6)
HGB BLD-MCNC: 10.8 G/DL (ref 12–15.9)
IMM GRANULOCYTES # BLD AUTO: 0.1 10*3/MM3 (ref 0–0.05)
IMM GRANULOCYTES NFR BLD AUTO: 1.4 % (ref 0–0.5)
LYMPHOCYTES # BLD AUTO: 2.58 10*3/MM3 (ref 0.7–3.1)
LYMPHOCYTES NFR BLD AUTO: 37.2 % (ref 19.6–45.3)
MCH RBC QN AUTO: 32.5 PG (ref 26.6–33)
MCHC RBC AUTO-ENTMCNC: 32.1 G/DL (ref 31.5–35.7)
MCV RBC AUTO: 101.2 FL (ref 79–97)
MONOCYTES # BLD AUTO: 0.34 10*3/MM3 (ref 0.1–0.9)
MONOCYTES NFR BLD AUTO: 4.9 % (ref 5–12)
NEUTROPHILS NFR BLD AUTO: 3.38 10*3/MM3 (ref 1.7–7)
NEUTROPHILS NFR BLD AUTO: 48.7 % (ref 42.7–76)
NRBC BLD AUTO-RTO: 0 /100 WBC (ref 0–0.2)
PLATELET # BLD AUTO: 353 10*3/MM3 (ref 140–450)
PMV BLD AUTO: 11.8 FL (ref 6–12)
RBC # BLD AUTO: 3.32 10*6/MM3 (ref 3.77–5.28)
WBC # BLD AUTO: 6.94 10*3/MM3 (ref 3.4–10.8)

## 2020-07-22 PROCEDURE — G0463 HOSPITAL OUTPT CLINIC VISIT: HCPCS

## 2020-07-22 PROCEDURE — 36415 COLL VENOUS BLD VENIPUNCTURE: CPT

## 2020-07-22 PROCEDURE — 85025 COMPLETE CBC W/AUTO DIFF WBC: CPT | Performed by: INTERNAL MEDICINE

## 2020-07-22 NOTE — NURSING NOTE
HGB 10.8 today.  PT did not require procrit shot today.  PT stated that her dizziness has resolved since she has received a blood transfusion.

## 2020-07-24 RX ORDER — CEFDINIR 300 MG/1
300 CAPSULE ORAL 2 TIMES DAILY
Qty: 20 CAPSULE | Refills: 0 | Status: SHIPPED | OUTPATIENT
Start: 2020-07-24 | End: 2020-08-03

## 2020-07-27 ENCOUNTER — TELEPHONE (OUTPATIENT)
Dept: INTERNAL MEDICINE | Facility: CLINIC | Age: 80
End: 2020-07-27

## 2020-07-27 NOTE — TELEPHONE ENCOUNTER
PATIENT STATES THAT SHE HAS A REALLY BAD UTI, PATIENT STATES THAT SHE HAS BURNING WHEN SHE URINATES, AND THAT HER URINE IS CLOUDY. PATIENT REQUESTED TO HAVE SOMETHING CALLED IN TO HELP WITH THIS    BEST CALL BACK  [phone]

## 2020-07-29 ENCOUNTER — APPOINTMENT (OUTPATIENT)
Dept: CT IMAGING | Facility: HOSPITAL | Age: 80
End: 2020-07-29

## 2020-07-29 ENCOUNTER — LAB (OUTPATIENT)
Dept: LAB | Facility: HOSPITAL | Age: 80
End: 2020-07-29

## 2020-07-29 ENCOUNTER — HOSPITAL ENCOUNTER (OUTPATIENT)
Facility: HOSPITAL | Age: 80
Setting detail: OBSERVATION
Discharge: HOME HEALTH | End: 2020-07-30
Attending: EMERGENCY MEDICINE | Admitting: HOSPITALIST

## 2020-07-29 ENCOUNTER — INFUSION (OUTPATIENT)
Dept: ONCOLOGY | Facility: HOSPITAL | Age: 80
End: 2020-07-29

## 2020-07-29 ENCOUNTER — APPOINTMENT (OUTPATIENT)
Dept: GENERAL RADIOLOGY | Facility: HOSPITAL | Age: 80
End: 2020-07-29

## 2020-07-29 DIAGNOSIS — R41.0 DISORIENTATION: ICD-10-CM

## 2020-07-29 DIAGNOSIS — N30.90 CYSTITIS: Primary | ICD-10-CM

## 2020-07-29 DIAGNOSIS — M25.561 ACUTE PAIN OF RIGHT KNEE: ICD-10-CM

## 2020-07-29 DIAGNOSIS — R41.0 CONFUSION: ICD-10-CM

## 2020-07-29 DIAGNOSIS — D46.C MYELODYSPLASTIC SYNDROME WITH 5Q DELETION (HCC): ICD-10-CM

## 2020-07-29 DIAGNOSIS — F41.9 ANXIETY AND DEPRESSION: ICD-10-CM

## 2020-07-29 DIAGNOSIS — F32.A ANXIETY AND DEPRESSION: ICD-10-CM

## 2020-07-29 DIAGNOSIS — D64.9 ANEMIA REQUIRING TRANSFUSIONS: ICD-10-CM

## 2020-07-29 LAB
ALBUMIN SERPL-MCNC: 3.7 G/DL (ref 3.5–5.2)
ALBUMIN/GLOB SERPL: 1.1 G/DL
ALP SERPL-CCNC: 118 U/L (ref 39–117)
ALT SERPL W P-5'-P-CCNC: 24 U/L (ref 1–33)
ANION GAP SERPL CALCULATED.3IONS-SCNC: 13.8 MMOL/L (ref 5–15)
ANISOCYTOSIS BLD QL: NORMAL
AST SERPL-CCNC: 32 U/L (ref 1–32)
BACTERIA UR QL AUTO: ABNORMAL /HPF
BASOPHILS # BLD AUTO: 0.06 10*3/MM3 (ref 0–0.2)
BASOPHILS # BLD AUTO: 0.1 10*3/MM3 (ref 0–0.2)
BASOPHILS NFR BLD AUTO: 0.8 % (ref 0–1.5)
BASOPHILS NFR BLD AUTO: 1.7 % (ref 0–1.5)
BILIRUB SERPL-MCNC: 1.1 MG/DL (ref 0–1.2)
BILIRUB UR QL STRIP: NEGATIVE
BUN SERPL-MCNC: 39 MG/DL (ref 8–23)
BUN/CREAT SERPL: 16.4 (ref 7–25)
CALCIUM SPEC-SCNC: 9.2 MG/DL (ref 8.6–10.5)
CHLORIDE SERPL-SCNC: 97 MMOL/L (ref 98–107)
CLARITY UR: CLEAR
CO2 SERPL-SCNC: 24.2 MMOL/L (ref 22–29)
COLOR UR: YELLOW
CREAT SERPL-MCNC: 2.38 MG/DL (ref 0.57–1)
CRP SERPL-MCNC: 12.31 MG/DL (ref 0–0.5)
DEPRECATED RDW RBC AUTO: 82 FL (ref 37–54)
DEPRECATED RDW RBC AUTO: 84.8 FL (ref 37–54)
EOSINOPHIL # BLD AUTO: 0.54 10*3/MM3 (ref 0–0.4)
EOSINOPHIL # BLD AUTO: 0.63 10*3/MM3 (ref 0–0.4)
EOSINOPHIL NFR BLD AUTO: 8.2 % (ref 0.3–6.2)
EOSINOPHIL NFR BLD AUTO: 9.1 % (ref 0.3–6.2)
ERYTHROCYTE [DISTWIDTH] IN BLOOD BY AUTOMATED COUNT: 22.6 % (ref 12.3–15.4)
ERYTHROCYTE [DISTWIDTH] IN BLOOD BY AUTOMATED COUNT: 22.8 % (ref 12.3–15.4)
GFR SERPL CREATININE-BSD FRML MDRD: 20 ML/MIN/1.73
GLOBULIN UR ELPH-MCNC: 3.3 GM/DL
GLUCOSE BLDC GLUCOMTR-MCNC: 166 MG/DL (ref 70–130)
GLUCOSE BLDC GLUCOMTR-MCNC: 199 MG/DL (ref 70–130)
GLUCOSE BLDC GLUCOMTR-MCNC: 231 MG/DL (ref 70–130)
GLUCOSE SERPL-MCNC: 204 MG/DL (ref 65–99)
GLUCOSE UR STRIP-MCNC: NEGATIVE MG/DL
HBA1C MFR BLD: 6.6 % (ref 4.8–5.6)
HCT VFR BLD AUTO: 30.8 % (ref 34–46.6)
HCT VFR BLD AUTO: 31 % (ref 34–46.6)
HGB BLD-MCNC: 10 G/DL (ref 12–15.9)
HGB BLD-MCNC: 9.7 G/DL (ref 12–15.9)
HGB UR QL STRIP.AUTO: ABNORMAL
HYALINE CASTS UR QL AUTO: ABNORMAL /LPF
IMM GRANULOCYTES # BLD AUTO: 0.1 10*3/MM3 (ref 0–0.05)
IMM GRANULOCYTES # BLD AUTO: 0.18 10*3/MM3 (ref 0–0.05)
IMM GRANULOCYTES NFR BLD AUTO: 1.7 % (ref 0–0.5)
IMM GRANULOCYTES NFR BLD AUTO: 2.3 % (ref 0–0.5)
INR PPP: 1.91 (ref 0.9–1.1)
KETONES UR QL STRIP: NEGATIVE
LEUKOCYTE ESTERASE UR QL STRIP.AUTO: ABNORMAL
LYMPHOCYTES # BLD AUTO: 0.99 10*3/MM3 (ref 0.7–3.1)
LYMPHOCYTES # BLD AUTO: 1.27 10*3/MM3 (ref 0.7–3.1)
LYMPHOCYTES NFR BLD AUTO: 16.5 % (ref 19.6–45.3)
LYMPHOCYTES NFR BLD AUTO: 16.7 % (ref 19.6–45.3)
MAGNESIUM SERPL-MCNC: 2.1 MG/DL (ref 1.6–2.4)
MCH RBC QN AUTO: 32.7 PG (ref 26.6–33)
MCH RBC QN AUTO: 33.1 PG (ref 26.6–33)
MCHC RBC AUTO-ENTMCNC: 31.3 G/DL (ref 31.5–35.7)
MCHC RBC AUTO-ENTMCNC: 32.5 G/DL (ref 31.5–35.7)
MCV RBC AUTO: 102 FL (ref 79–97)
MCV RBC AUTO: 104.4 FL (ref 79–97)
MONOCYTES # BLD AUTO: 0.31 10*3/MM3 (ref 0.1–0.9)
MONOCYTES # BLD AUTO: 0.42 10*3/MM3 (ref 0.1–0.9)
MONOCYTES NFR BLD AUTO: 5.2 % (ref 5–12)
MONOCYTES NFR BLD AUTO: 5.4 % (ref 5–12)
NEUTROPHILS NFR BLD AUTO: 3.88 10*3/MM3 (ref 1.7–7)
NEUTROPHILS NFR BLD AUTO: 5.16 10*3/MM3 (ref 1.7–7)
NEUTROPHILS NFR BLD AUTO: 65.6 % (ref 42.7–76)
NEUTROPHILS NFR BLD AUTO: 66.8 % (ref 42.7–76)
NITRITE UR QL STRIP: NEGATIVE
NRBC BLD AUTO-RTO: 0 /100 WBC (ref 0–0.2)
PH UR STRIP.AUTO: 5.5 [PH] (ref 4.5–8)
PLAT MORPH BLD: NORMAL
PLATELET # BLD AUTO: 334 10*3/MM3 (ref 140–450)
PLATELET # BLD AUTO: 342 10*3/MM3 (ref 140–450)
PMV BLD AUTO: 11.4 FL (ref 6–12)
PMV BLD AUTO: 11.5 FL (ref 6–12)
POTASSIUM SERPL-SCNC: 4.6 MMOL/L (ref 3.5–5.2)
PROCALCITONIN SERPL-MCNC: 0.95 NG/ML (ref 0–0.25)
PROT SERPL-MCNC: 7 G/DL (ref 6–8.5)
PROT UR QL STRIP: ABNORMAL
PROTHROMBIN TIME: 21.9 SECONDS (ref 12.1–15)
RBC # BLD AUTO: 2.97 10*6/MM3 (ref 3.77–5.28)
RBC # BLD AUTO: 3.02 10*6/MM3 (ref 3.77–5.28)
RBC # UR: ABNORMAL /HPF
REF LAB TEST METHOD: ABNORMAL
SARS-COV-2 RNA PNL SPEC NAA+PROBE: NOT DETECTED
SODIUM SERPL-SCNC: 135 MMOL/L (ref 136–145)
SP GR UR STRIP: 1.01 (ref 1–1.03)
SQUAMOUS #/AREA URNS HPF: ABNORMAL /HPF
TSH SERPL DL<=0.05 MIU/L-ACNC: 2.37 UIU/ML (ref 0.27–4.2)
UROBILINOGEN UR QL STRIP: ABNORMAL
WBC # BLD AUTO: 5.92 10*3/MM3 (ref 3.4–10.8)
WBC # BLD AUTO: 7.72 10*3/MM3 (ref 3.4–10.8)
WBC MORPH BLD: NORMAL
WBC UR QL AUTO: ABNORMAL /HPF

## 2020-07-29 PROCEDURE — 99284 EMERGENCY DEPT VISIT MOD MDM: CPT

## 2020-07-29 PROCEDURE — G0378 HOSPITAL OBSERVATION PER HR: HCPCS

## 2020-07-29 PROCEDURE — 85025 COMPLETE CBC W/AUTO DIFF WBC: CPT | Performed by: INTERNAL MEDICINE

## 2020-07-29 PROCEDURE — 71045 X-RAY EXAM CHEST 1 VIEW: CPT

## 2020-07-29 PROCEDURE — 81001 URINALYSIS AUTO W/SCOPE: CPT | Performed by: EMERGENCY MEDICINE

## 2020-07-29 PROCEDURE — 82962 GLUCOSE BLOOD TEST: CPT

## 2020-07-29 PROCEDURE — 99219 PR INITIAL OBSERVATION CARE/DAY 50 MINUTES: CPT | Performed by: HOSPITALIST

## 2020-07-29 PROCEDURE — 74176 CT ABD & PELVIS W/O CONTRAST: CPT

## 2020-07-29 PROCEDURE — 99284 EMERGENCY DEPT VISIT MOD MDM: CPT | Performed by: EMERGENCY MEDICINE

## 2020-07-29 PROCEDURE — 84145 PROCALCITONIN (PCT): CPT | Performed by: HOSPITALIST

## 2020-07-29 PROCEDURE — 85007 BL SMEAR W/DIFF WBC COUNT: CPT | Performed by: EMERGENCY MEDICINE

## 2020-07-29 PROCEDURE — C9803 HOPD COVID-19 SPEC COLLECT: HCPCS

## 2020-07-29 PROCEDURE — 80053 COMPREHEN METABOLIC PANEL: CPT | Performed by: EMERGENCY MEDICINE

## 2020-07-29 PROCEDURE — 87635 SARS-COV-2 COVID-19 AMP PRB: CPT | Performed by: EMERGENCY MEDICINE

## 2020-07-29 PROCEDURE — 63710000001 INSULIN DETEMIR PER 5 UNITS: Performed by: HOSPITALIST

## 2020-07-29 PROCEDURE — 96361 HYDRATE IV INFUSION ADD-ON: CPT

## 2020-07-29 PROCEDURE — 86140 C-REACTIVE PROTEIN: CPT | Performed by: EMERGENCY MEDICINE

## 2020-07-29 PROCEDURE — 85025 COMPLETE CBC W/AUTO DIFF WBC: CPT | Performed by: EMERGENCY MEDICINE

## 2020-07-29 PROCEDURE — 83735 ASSAY OF MAGNESIUM: CPT | Performed by: EMERGENCY MEDICINE

## 2020-07-29 PROCEDURE — 87086 URINE CULTURE/COLONY COUNT: CPT | Performed by: EMERGENCY MEDICINE

## 2020-07-29 PROCEDURE — 84443 ASSAY THYROID STIM HORMONE: CPT | Performed by: HOSPITALIST

## 2020-07-29 PROCEDURE — 70450 CT HEAD/BRAIN W/O DYE: CPT

## 2020-07-29 PROCEDURE — 83036 HEMOGLOBIN GLYCOSYLATED A1C: CPT | Performed by: HOSPITALIST

## 2020-07-29 PROCEDURE — 85610 PROTHROMBIN TIME: CPT | Performed by: EMERGENCY MEDICINE

## 2020-07-29 PROCEDURE — P9612 CATHETERIZE FOR URINE SPEC: HCPCS

## 2020-07-29 PROCEDURE — 36415 COLL VENOUS BLD VENIPUNCTURE: CPT

## 2020-07-29 RX ORDER — SODIUM CHLORIDE 9 MG/ML
75 INJECTION, SOLUTION INTRAVENOUS CONTINUOUS
Status: DISCONTINUED | OUTPATIENT
Start: 2020-07-29 | End: 2020-07-30

## 2020-07-29 RX ORDER — MIDODRINE HYDROCHLORIDE 2.5 MG/1
TABLET ORAL
Qty: 90 TABLET | Refills: 5 | Status: SHIPPED | OUTPATIENT
Start: 2020-07-29 | End: 2020-07-30 | Stop reason: HOSPADM

## 2020-07-29 RX ORDER — DEXTROSE MONOHYDRATE 25 G/50ML
25 INJECTION, SOLUTION INTRAVENOUS
Status: DISCONTINUED | OUTPATIENT
Start: 2020-07-29 | End: 2020-07-30 | Stop reason: HOSPADM

## 2020-07-29 RX ORDER — LEVOTHYROXINE SODIUM 88 UG/1
88 TABLET ORAL DAILY
Status: DISCONTINUED | OUTPATIENT
Start: 2020-07-30 | End: 2020-07-30 | Stop reason: HOSPADM

## 2020-07-29 RX ORDER — ACETAMINOPHEN 325 MG/1
650 TABLET ORAL EVERY 6 HOURS PRN
Status: DISCONTINUED | OUTPATIENT
Start: 2020-07-29 | End: 2020-07-30 | Stop reason: HOSPADM

## 2020-07-29 RX ORDER — PANTOPRAZOLE SODIUM 40 MG/1
40 TABLET, DELAYED RELEASE ORAL EVERY MORNING
Status: DISCONTINUED | OUTPATIENT
Start: 2020-07-30 | End: 2020-07-30 | Stop reason: HOSPADM

## 2020-07-29 RX ORDER — NICOTINE POLACRILEX 4 MG
15 LOZENGE BUCCAL
Status: DISCONTINUED | OUTPATIENT
Start: 2020-07-29 | End: 2020-07-30 | Stop reason: HOSPADM

## 2020-07-29 RX ORDER — MIDODRINE HYDROCHLORIDE 5 MG/1
2.5 TABLET ORAL
Status: DISCONTINUED | OUTPATIENT
Start: 2020-07-29 | End: 2020-07-30

## 2020-07-29 RX ORDER — ATORVASTATIN CALCIUM 10 MG/1
10 TABLET, FILM COATED ORAL NIGHTLY
Status: DISCONTINUED | OUTPATIENT
Start: 2020-07-29 | End: 2020-07-30 | Stop reason: HOSPADM

## 2020-07-29 RX ORDER — DESVENLAFAXINE SUCCINATE 50 MG/1
50 TABLET, EXTENDED RELEASE ORAL DAILY
Status: DISCONTINUED | OUTPATIENT
Start: 2020-07-30 | End: 2020-07-30 | Stop reason: HOSPADM

## 2020-07-29 RX ORDER — ALBUTEROL SULFATE 2.5 MG/3ML
2.5 SOLUTION RESPIRATORY (INHALATION) EVERY 6 HOURS PRN
Status: DISCONTINUED | OUTPATIENT
Start: 2020-07-29 | End: 2020-07-30 | Stop reason: HOSPADM

## 2020-07-29 RX ADMIN — MIDODRINE HYDROCHLORIDE 2.5 MG: 5 TABLET ORAL at 20:49

## 2020-07-29 RX ADMIN — SODIUM CHLORIDE 75 ML/HR: 9 INJECTION, SOLUTION INTRAVENOUS at 17:47

## 2020-07-29 RX ADMIN — INSULIN DETEMIR 20 UNITS: 100 INJECTION, SOLUTION SUBCUTANEOUS at 20:50

## 2020-07-29 RX ADMIN — APIXABAN 5 MG: 2.5 TABLET, FILM COATED ORAL at 20:50

## 2020-07-29 RX ADMIN — ATORVASTATIN CALCIUM 10 MG: 10 TABLET, FILM COATED ORAL at 20:50

## 2020-07-29 NOTE — NURSING NOTE
HGB 10.0 today.  Pt did not require a procrit injection today.  Pt confused this am.  Pt's son plans to take to ER following this visit.  Pt has been diagnosed with an UTI last week but patient feeling worse.

## 2020-07-30 ENCOUNTER — READMISSION MANAGEMENT (OUTPATIENT)
Dept: CALL CENTER | Facility: HOSPITAL | Age: 80
End: 2020-07-30

## 2020-07-30 VITALS
TEMPERATURE: 97.8 F | HEIGHT: 66 IN | WEIGHT: 194.8 LBS | OXYGEN SATURATION: 96 % | HEART RATE: 95 BPM | RESPIRATION RATE: 16 BRPM | DIASTOLIC BLOOD PRESSURE: 68 MMHG | SYSTOLIC BLOOD PRESSURE: 124 MMHG | BODY MASS INDEX: 31.31 KG/M2

## 2020-07-30 VITALS
HEART RATE: 74 BPM | DIASTOLIC BLOOD PRESSURE: 70 MMHG | OXYGEN SATURATION: 92 % | TEMPERATURE: 97.9 F | SYSTOLIC BLOOD PRESSURE: 148 MMHG | RESPIRATION RATE: 16 BRPM

## 2020-07-30 LAB
25(OH)D3 SERPL-MCNC: 33 NG/ML (ref 30–100)
ALBUMIN SERPL-MCNC: 3.3 G/DL (ref 3.5–5.2)
ANION GAP SERPL CALCULATED.3IONS-SCNC: 8.4 MMOL/L (ref 5–15)
BACTERIA SPEC AEROBE CULT: NO GROWTH
BUN SERPL-MCNC: 38 MG/DL (ref 8–23)
BUN/CREAT SERPL: 17.2 (ref 7–25)
CALCIUM SPEC-SCNC: 8.9 MG/DL (ref 8.6–10.5)
CHLORIDE SERPL-SCNC: 102 MMOL/L (ref 98–107)
CO2 SERPL-SCNC: 28.6 MMOL/L (ref 22–29)
CREAT SERPL-MCNC: 2.21 MG/DL (ref 0.57–1)
GFR SERPL CREATININE-BSD FRML MDRD: 21 ML/MIN/1.73
GLUCOSE BLDC GLUCOMTR-MCNC: 110 MG/DL (ref 70–130)
GLUCOSE BLDC GLUCOMTR-MCNC: 92 MG/DL (ref 70–130)
GLUCOSE SERPL-MCNC: 103 MG/DL (ref 65–99)
PHOSPHATE SERPL-MCNC: 4.7 MG/DL (ref 2.5–4.5)
POTASSIUM SERPL-SCNC: 4.4 MMOL/L (ref 3.5–5.2)
PROCALCITONIN SERPL-MCNC: 0.74 NG/ML (ref 0–0.25)
SODIUM SERPL-SCNC: 139 MMOL/L (ref 136–145)

## 2020-07-30 PROCEDURE — G0378 HOSPITAL OBSERVATION PER HR: HCPCS

## 2020-07-30 PROCEDURE — 96374 THER/PROPH/DIAG INJ IV PUSH: CPT

## 2020-07-30 PROCEDURE — 25010000002 ONDANSETRON PER 1 MG: Performed by: HOSPITALIST

## 2020-07-30 PROCEDURE — 80069 RENAL FUNCTION PANEL: CPT | Performed by: HOSPITALIST

## 2020-07-30 PROCEDURE — 97161 PT EVAL LOW COMPLEX 20 MIN: CPT

## 2020-07-30 PROCEDURE — 96361 HYDRATE IV INFUSION ADD-ON: CPT

## 2020-07-30 PROCEDURE — 82962 GLUCOSE BLOOD TEST: CPT

## 2020-07-30 PROCEDURE — 99217 PR OBSERVATION CARE DISCHARGE MANAGEMENT: CPT | Performed by: HOSPITALIST

## 2020-07-30 PROCEDURE — 92610 EVALUATE SWALLOWING FUNCTION: CPT

## 2020-07-30 PROCEDURE — 84145 PROCALCITONIN (PCT): CPT | Performed by: HOSPITALIST

## 2020-07-30 PROCEDURE — 82306 VITAMIN D 25 HYDROXY: CPT | Performed by: HOSPITALIST

## 2020-07-30 RX ORDER — MIDODRINE HYDROCHLORIDE 5 MG/1
5 TABLET ORAL
Qty: 90 TABLET | Refills: 0 | Status: SHIPPED | OUTPATIENT
Start: 2020-07-30 | End: 2020-08-17 | Stop reason: SDUPTHER

## 2020-07-30 RX ORDER — ONDANSETRON 2 MG/ML
4 INJECTION INTRAMUSCULAR; INTRAVENOUS EVERY 6 HOURS PRN
Status: DISCONTINUED | OUTPATIENT
Start: 2020-07-30 | End: 2020-07-30 | Stop reason: HOSPADM

## 2020-07-30 RX ORDER — ATROPA BELLADONNA AND OPIUM 16.2; 3 MG/1; MG/1
30 SUPPOSITORY RECTAL 2 TIMES DAILY PRN
Qty: 12 SUPPOSITORY | Refills: 0 | Status: SHIPPED | OUTPATIENT
Start: 2020-07-30 | End: 2020-08-09

## 2020-07-30 RX ORDER — MIDODRINE HYDROCHLORIDE 5 MG/1
5 TABLET ORAL
Status: DISCONTINUED | OUTPATIENT
Start: 2020-07-30 | End: 2020-07-30 | Stop reason: HOSPADM

## 2020-07-30 RX ORDER — ATROPA BELLADONNA AND OPIUM 16.2; 3 MG/1; MG/1
30 SUPPOSITORY RECTAL EVERY 8 HOURS PRN
Status: DISCONTINUED | OUTPATIENT
Start: 2020-07-30 | End: 2020-07-30 | Stop reason: HOSPADM

## 2020-07-30 RX ADMIN — PANTOPRAZOLE SODIUM 40 MG: 40 TABLET, DELAYED RELEASE ORAL at 06:01

## 2020-07-30 RX ADMIN — ATROPA BELLADONNA AND OPIUM 30 MG: 16.2; 3 SUPPOSITORY RECTAL at 09:22

## 2020-07-30 RX ADMIN — SODIUM CHLORIDE 75 ML/HR: 9 INJECTION, SOLUTION INTRAVENOUS at 05:55

## 2020-07-30 RX ADMIN — ONDANSETRON 4 MG: 2 INJECTION, SOLUTION INTRAMUSCULAR; INTRAVENOUS at 09:19

## 2020-07-30 RX ADMIN — DESVENLAFAXINE SUCCINATE 50 MG: 50 TABLET, EXTENDED RELEASE ORAL at 08:37

## 2020-07-30 RX ADMIN — MIDODRINE HYDROCHLORIDE 2.5 MG: 5 TABLET ORAL at 08:36

## 2020-07-30 RX ADMIN — LEVOTHYROXINE SODIUM 88 MCG: 88 TABLET ORAL at 08:36

## 2020-07-30 RX ADMIN — MIDODRINE HYDROCHLORIDE 5 MG: 5 TABLET ORAL at 12:40

## 2020-07-30 RX ADMIN — ACETAMINOPHEN 650 MG: 325 TABLET, FILM COATED ORAL at 01:06

## 2020-07-30 RX ADMIN — APIXABAN 5 MG: 2.5 TABLET, FILM COATED ORAL at 08:36

## 2020-07-30 NOTE — OUTREACH NOTE
Prep Survey      Responses   [facility] facility patient discharged from?  [place]   Is LACE score < 7 ?  No   Eligibility  [facility]   Hospital  [place]   Date of Admission  07/29/20   Date of Discharge  07/30/20   Discharge Disposition  Home or Self Care   Discharge diagnosis  Lower abd pain with confusion   COVID-19 Test Status  Negative   Does the patient have one of the following disease processes/diagnoses(primary or secondary)?  Other   Does the patient have Home health ordered?  Yes   What is the Home health agency?   [facility]   Is there a DME ordered?  No   Prep survey completed?  Yes          Joie Keane RN

## 2020-07-31 ENCOUNTER — TRANSITIONAL CARE MANAGEMENT TELEPHONE ENCOUNTER (OUTPATIENT)
Dept: CALL CENTER | Facility: HOSPITAL | Age: 80
End: 2020-07-31

## 2020-07-31 RX ORDER — GABAPENTIN 400 MG/1
CAPSULE ORAL
Qty: 120 CAPSULE | Refills: 2 | Status: SHIPPED | OUTPATIENT
Start: 2020-07-31 | End: 2020-10-02

## 2020-07-31 RX ORDER — DESVENLAFAXINE SUCCINATE 50 MG/1
TABLET, EXTENDED RELEASE ORAL
Qty: 90 TABLET | Refills: 1 | Status: SHIPPED | OUTPATIENT
Start: 2020-07-31 | End: 2020-12-18

## 2020-07-31 NOTE — OUTREACH NOTE
Call Center TCM Note      Responses   List of hospitals in [place] patient discharged from?  [facility]   COVID-19 Test Status  Negative   Does the patient have one of the following disease processes/diagnoses(primary or secondary)?  Other   TCM attempt successful?  No   Unsuccessful attempts  Attempt 1          Ivanna Kaufman RN    7/31/2020, 17:18

## 2020-07-31 NOTE — OUTREACH NOTE
Call Center TCM Note      Responses   [facility] patient discharged from?  [facility]   COVID-19 Test Status  Negative   Does the patient have one of the following disease processes/diagnoses(primary or secondary)?  Other   TCM attempt successful?  Yes   Call start time  1807   Call end time  1815   Discharge diagnosis  Lower abd pain with confusion   Is patient permission given to speak with other caregiver?  No   Meds reviewed with patient/caregiver?  Yes   Is the patient having any side effects they believe may be caused by any medication additions or changes?  No   Does the patient have all medications ordered at discharge?  Yes   Is the patient taking all medications as directed (includes completed medication regime)?  Yes [Patient had not been taking the PRN B&O supprettes, but c/o pain. Advised to use as directed. ]   Does the patient have a primary care provider?   Yes   Does the patient have an appointment with their PCP within 7 days of discharge?  Yes   Comments regarding PCP  PCP Dr Mercado. Hospital follow up scheduled for Tuesday 8/4 245pm   Has the patient kept scheduled appointments due by today?  N/A   What is the Home health agency?   [facility]    Has home health visited the patient within 72 hours of discharge?  Call prior to 72 hours   Pulse Ox monitoring  None   Psychosocial issues?  No   Did the patient receive a copy of their discharge instructions?  Yes   Nursing interventions  Reviewed instructions with patient   What is the patient's perception of their health status since discharge?  Same   Is the patient/caregiver able to teach back signs and symptoms related to disease process for when to call PCP?  Yes   Is the patient/caregiver able to teach back signs and symptoms related to disease process for when to call 911?  Yes   Is the patient/caregiver able to teach back the hierarchy of who to call/visit for symptoms/problems? PCP, Specialist, Home health nurse, Urgent Care, ED, 911  Yes    TCM call completed?  Yes          Ivanna Kaufman RN    7/31/2020, 18:15

## 2020-08-04 ENCOUNTER — OFFICE VISIT (OUTPATIENT)
Dept: INTERNAL MEDICINE | Facility: CLINIC | Age: 80
End: 2020-08-04

## 2020-08-04 VITALS
RESPIRATION RATE: 16 BRPM | SYSTOLIC BLOOD PRESSURE: 102 MMHG | BODY MASS INDEX: 31.18 KG/M2 | TEMPERATURE: 97 F | HEIGHT: 66 IN | WEIGHT: 194 LBS | OXYGEN SATURATION: 95 % | DIASTOLIC BLOOD PRESSURE: 60 MMHG | HEART RATE: 78 BPM

## 2020-08-04 DIAGNOSIS — N30.90 RECURRENT CYSTITIS: ICD-10-CM

## 2020-08-04 DIAGNOSIS — Z09 HOSPITAL DISCHARGE FOLLOW-UP: Primary | ICD-10-CM

## 2020-08-04 DIAGNOSIS — I95.1 ORTHOSTATIC HYPOTENSION: ICD-10-CM

## 2020-08-04 PROCEDURE — 99495 TRANSJ CARE MGMT MOD F2F 14D: CPT | Performed by: INTERNAL MEDICINE

## 2020-08-04 NOTE — PROGRESS NOTES
Transitional Care Follow Up Visit  Subjective     Joya Singh is a 80 y.o. female who presents for a transitional care management visit.    Within 48 business hours after discharge our office contacted her via telephone to coordinate her care and needs.      I reviewed and discussed the details of that call along with the discharge summary, hospital problems, inpatient lab results, inpatient diagnostic studies, and consultation reports with Joya.     Current outpatient and discharge medications have been reconciled for the patient.  Reviewed by: Chari Mercado MD      Date of TCM Phone Call 7/30/2020   Hospital [place]   Date of Admission 7/29/2020   Date of Discharge 7/30/2020   Discharge Disposition Home or Self Care     Risk for Readmission (LACE) Score: 7 (7/30/2020  6:00 AM)      History of Present Illness   Course During Hospital Stay:  7/29-7/30.  Records reviewed but D/c summary not complete at this time.    Pt brought to the ED on 7/29 by her son bc of confusion and AMS.  She has had issues with recurrent UTI's.  She was on cefdinir at the time and this was changed to keflex.   Ct head unremarkable.  covid testing neg.  She improved and was sent home.  She is to f/u with urology as outpatient.  The opium belladonna suppositories helped with bladder spasms but they had issues getting her insurance to cover it.     Pt c/o itching that began in the hospital.  No rash.  She has scratched her right lower leg so much she made it bleed.  In reviewing her med list it appears she was started on midodrine.  This med is known for causing paresthesias.  She has had low bp's.  She is not on any bp lowering meds.  I have encouraged her to cont to push fluids.      [facility] now coming to see the patient.    The following portions of the patient's history were reviewed and updated as appropriate: allergies, current medications, past family history, past medical history, past social history, past surgical  history and problem list.    Review of Systems   Constitutional: Negative.    HENT: Negative.    Eyes: Negative.    Respiratory: Negative.    Cardiovascular: Negative.    Gastrointestinal: Negative.    Endocrine: Negative.    Genitourinary: Negative.    Musculoskeletal: Negative.    Skin: Negative.    Allergic/Immunologic: Negative.    Neurological: Negative.    Hematological: Negative.    Psychiatric/Behavioral: Negative.    All other systems reviewed and are negative.      Objective   Physical Exam   Constitutional: She is oriented to person, place, and time. She appears well-developed and well-nourished. No distress.   HENT:   Head: Normocephalic and atraumatic.   Right Ear: External ear normal.   Left Ear: External ear normal.   Nose: Nose normal.   Mouth/Throat: Oropharynx is clear and moist.   Eyes: Pupils are equal, round, and reactive to light. Conjunctivae and EOM are normal.   Neck: Normal range of motion. Neck supple.   Cardiovascular: Normal rate, regular rhythm, normal heart sounds and intact distal pulses.   Pulmonary/Chest: Effort normal and breath sounds normal. No respiratory distress. She has no wheezes.   Musculoskeletal:   In wheelchair   Neurological: She is alert and oriented to person, place, and time.   Skin: Skin is warm and dry.   Psychiatric: She has a normal mood and affect. Her behavior is normal. Judgment and thought content normal.   Nursing note and vitals reviewed.      Assessment/Plan   Joya was seen today for cystitis and itching.    Diagnoses and all orders for this visit:    Hospital discharge follow-up    Recurrent cystitis  -     Ambulatory Referral to Urology    Orthostatic hypotension    reviewed midodrine side effects w pt.  bp 102/60 today with the [facility].      [facility] with [facility].        Recheck in 1 month.

## 2020-08-05 ENCOUNTER — LAB (OUTPATIENT)
Dept: LAB | Facility: HOSPITAL | Age: 80
End: 2020-08-05

## 2020-08-05 ENCOUNTER — INFUSION (OUTPATIENT)
Dept: ONCOLOGY | Facility: HOSPITAL | Age: 80
End: 2020-08-05

## 2020-08-05 VITALS
TEMPERATURE: 98.3 F | SYSTOLIC BLOOD PRESSURE: 106 MMHG | DIASTOLIC BLOOD PRESSURE: 62 MMHG | OXYGEN SATURATION: 98 % | HEART RATE: 70 BPM

## 2020-08-05 DIAGNOSIS — D64.9 ANEMIA REQUIRING TRANSFUSIONS: Primary | ICD-10-CM

## 2020-08-05 DIAGNOSIS — N18.30 CKD STAGE 3 DUE TO TYPE 2 DIABETES MELLITUS (HCC): ICD-10-CM

## 2020-08-05 DIAGNOSIS — E11.22 CKD STAGE 3 DUE TO TYPE 2 DIABETES MELLITUS (HCC): ICD-10-CM

## 2020-08-05 DIAGNOSIS — D64.9 ANEMIA REQUIRING TRANSFUSIONS: ICD-10-CM

## 2020-08-05 DIAGNOSIS — D46.C MYELODYSPLASTIC SYNDROME WITH 5Q DELETION (HCC): ICD-10-CM

## 2020-08-05 LAB
BASOPHILS # BLD AUTO: 0.1 10*3/MM3 (ref 0–0.2)
BASOPHILS NFR BLD AUTO: 2 % (ref 0–1.5)
DEPRECATED RDW RBC AUTO: 78.5 FL (ref 37–54)
EOSINOPHIL # BLD AUTO: 0.49 10*3/MM3 (ref 0–0.4)
EOSINOPHIL NFR BLD AUTO: 9.8 % (ref 0.3–6.2)
ERYTHROCYTE [DISTWIDTH] IN BLOOD BY AUTOMATED COUNT: 21.7 % (ref 12.3–15.4)
HCT VFR BLD AUTO: 28.5 % (ref 34–46.6)
HGB BLD-MCNC: 9.2 G/DL (ref 12–15.9)
IMM GRANULOCYTES # BLD AUTO: 0.17 10*3/MM3 (ref 0–0.05)
IMM GRANULOCYTES NFR BLD AUTO: 3.4 % (ref 0–0.5)
LYMPHOCYTES # BLD AUTO: 1.54 10*3/MM3 (ref 0.7–3.1)
LYMPHOCYTES NFR BLD AUTO: 30.8 % (ref 19.6–45.3)
MCH RBC QN AUTO: 32.4 PG (ref 26.6–33)
MCHC RBC AUTO-ENTMCNC: 32.3 G/DL (ref 31.5–35.7)
MCV RBC AUTO: 100.4 FL (ref 79–97)
MONOCYTES # BLD AUTO: 0.17 10*3/MM3 (ref 0.1–0.9)
MONOCYTES NFR BLD AUTO: 3.4 % (ref 5–12)
NEUTROPHILS NFR BLD AUTO: 2.53 10*3/MM3 (ref 1.7–7)
NEUTROPHILS NFR BLD AUTO: 50.6 % (ref 42.7–76)
NRBC BLD AUTO-RTO: 0 /100 WBC (ref 0–0.2)
PLATELET # BLD AUTO: 232 10*3/MM3 (ref 140–450)
PMV BLD AUTO: 12.4 FL (ref 6–12)
RBC # BLD AUTO: 2.84 10*6/MM3 (ref 3.77–5.28)
WBC # BLD AUTO: 5 10*3/MM3 (ref 3.4–10.8)

## 2020-08-05 PROCEDURE — 25010000002 EPOETIN ALFA PER 1000 UNITS: Performed by: INTERNAL MEDICINE

## 2020-08-05 PROCEDURE — 85025 COMPLETE CBC W/AUTO DIFF WBC: CPT | Performed by: INTERNAL MEDICINE

## 2020-08-05 PROCEDURE — 36415 COLL VENOUS BLD VENIPUNCTURE: CPT

## 2020-08-05 PROCEDURE — 96372 THER/PROPH/DIAG INJ SC/IM: CPT

## 2020-08-05 RX ADMIN — ERYTHROPOIETIN 60000 UNITS: 40000 INJECTION, SOLUTION INTRAVENOUS; SUBCUTANEOUS at 10:46

## 2020-08-11 ENCOUNTER — TELEPHONE (OUTPATIENT)
Dept: INTERNAL MEDICINE | Facility: CLINIC | Age: 80
End: 2020-08-11

## 2020-08-11 NOTE — TELEPHONE ENCOUNTER
PATIENT CALLED AND STATES SHE HAS [facility] COMING TO HER HOME. SHE WANTS TO CANCEL THIS. SHE HAD AN APPOINTMENT FOR PHYSICAL THERAPY YESTERDAY AND SHE CANCELLED IT. THE PHYSICAL THERAPIST CALLED BACK, SHE STATED SHE WAS NOT DRESSED AND HE SAID THAT'S ALRIGHT HE COULD CHECK YOUR VITALS WITH YOUR GOWN ON. SHE DID NOT LIKE THE FACT HE WAS COMING OVER AND STILL IN HER GOWN.   ANOTHER PHYSICAL THERAPIST WAS TO COME OVER AND HER SON CANCELLED IT BUT SHE CAME ANY WAY. SHE WANTS CANCEL ALL OF IT. SHE DOES NOT WANT [facility].  SHE WOULD LIKE TO HAVE [facility].  SHE DOESN'T LIKE THE FACT THAT SHE IS ALONE WITH PHYSICAL THERAPIST.  PATIENT CALL BACK NUMBER [phone]    SHE HAS A PT COMING OVER TODAY AT 1:00 AND DOESN'T WANT THEM TO COME

## 2020-08-12 ENCOUNTER — HOSPITAL ENCOUNTER (OUTPATIENT)
Facility: HOSPITAL | Age: 80
Setting detail: OBSERVATION
Discharge: HOME | End: 2020-08-13
Attending: EMERGENCY MEDICINE | Admitting: EMERGENCY MEDICINE

## 2020-08-12 ENCOUNTER — LAB (OUTPATIENT)
Dept: LAB | Facility: HOSPITAL | Age: 80
End: 2020-08-12

## 2020-08-12 ENCOUNTER — INFUSION (OUTPATIENT)
Dept: ONCOLOGY | Facility: HOSPITAL | Age: 80
End: 2020-08-12

## 2020-08-12 ENCOUNTER — READMISSION MANAGEMENT (OUTPATIENT)
Dept: CALL CENTER | Facility: HOSPITAL | Age: 80
End: 2020-08-12

## 2020-08-12 VITALS
RESPIRATION RATE: 22 BRPM | HEART RATE: 77 BPM | SYSTOLIC BLOOD PRESSURE: 170 MMHG | DIASTOLIC BLOOD PRESSURE: 44 MMHG | OXYGEN SATURATION: 91 % | TEMPERATURE: 98.5 F

## 2020-08-12 DIAGNOSIS — D46.C MYELODYSPLASTIC SYNDROME WITH 5Q DELETION (HCC): ICD-10-CM

## 2020-08-12 DIAGNOSIS — D64.9 SYMPTOMATIC ANEMIA: ICD-10-CM

## 2020-08-12 DIAGNOSIS — D64.9 ANEMIA REQUIRING TRANSFUSIONS: ICD-10-CM

## 2020-08-12 DIAGNOSIS — R55 SYNCOPE, UNSPECIFIED SYNCOPE TYPE: Primary | ICD-10-CM

## 2020-08-12 DIAGNOSIS — R30.1 PAINFUL BLADDER SPASM: ICD-10-CM

## 2020-08-12 LAB
ABO GROUP BLD: NORMAL
ALBUMIN SERPL-MCNC: 3.8 G/DL (ref 3.5–5.2)
ALBUMIN/GLOB SERPL: 1.2 G/DL
ALP SERPL-CCNC: 153 U/L (ref 39–117)
ALT SERPL W P-5'-P-CCNC: 35 U/L (ref 1–33)
ANION GAP SERPL CALCULATED.3IONS-SCNC: 9.6 MMOL/L (ref 5–15)
AST SERPL-CCNC: 28 U/L (ref 1–32)
BASOPHILS # BLD AUTO: 0.06 10*3/MM3 (ref 0–0.2)
BASOPHILS # BLD AUTO: 0.07 10*3/MM3 (ref 0–0.2)
BASOPHILS NFR BLD AUTO: 1.3 % (ref 0–1.5)
BASOPHILS NFR BLD AUTO: 1.6 % (ref 0–1.5)
BILIRUB SERPL-MCNC: 0.5 MG/DL (ref 0–1.2)
BILIRUB UR QL STRIP: NEGATIVE
BLD GP AB SCN SERPL QL: NEGATIVE
BUN SERPL-MCNC: 26 MG/DL (ref 8–23)
BUN/CREAT SERPL: 14.1 (ref 7–25)
CALCIUM SPEC-SCNC: 8.8 MG/DL (ref 8.6–10.5)
CHLORIDE SERPL-SCNC: 103 MMOL/L (ref 98–107)
CLARITY UR: CLEAR
CO2 SERPL-SCNC: 26.4 MMOL/L (ref 22–29)
COLOR UR: YELLOW
CREAT SERPL-MCNC: 1.84 MG/DL (ref 0.57–1)
D DIMER PPP FEU-MCNC: 0.65 MCGFEU/ML (ref 0–0.46)
DEPRECATED RDW RBC AUTO: 79.7 FL (ref 37–54)
DEPRECATED RDW RBC AUTO: 79.9 FL (ref 37–54)
EOSINOPHIL # BLD AUTO: 0.5 10*3/MM3 (ref 0–0.4)
EOSINOPHIL # BLD AUTO: 0.53 10*3/MM3 (ref 0–0.4)
EOSINOPHIL NFR BLD AUTO: 11.2 % (ref 0.3–6.2)
EOSINOPHIL NFR BLD AUTO: 12.3 % (ref 0.3–6.2)
ERYTHROCYTE [DISTWIDTH] IN BLOOD BY AUTOMATED COUNT: 21.6 % (ref 12.3–15.4)
ERYTHROCYTE [DISTWIDTH] IN BLOOD BY AUTOMATED COUNT: 21.8 % (ref 12.3–15.4)
GFR SERPL CREATININE-BSD FRML MDRD: 27 ML/MIN/1.73
GLOBULIN UR ELPH-MCNC: 3.2 GM/DL
GLUCOSE BLDC GLUCOMTR-MCNC: 110 MG/DL (ref 70–130)
GLUCOSE SERPL-MCNC: 113 MG/DL (ref 65–99)
GLUCOSE UR STRIP-MCNC: NEGATIVE MG/DL
HCT VFR BLD AUTO: 22.4 % (ref 34–46.6)
HCT VFR BLD AUTO: 22.7 % (ref 34–46.6)
HGB BLD-MCNC: 7.1 G/DL (ref 12–15.9)
HGB BLD-MCNC: 7.1 G/DL (ref 12–15.9)
HGB UR QL STRIP.AUTO: NEGATIVE
IMM GRANULOCYTES # BLD AUTO: 0.02 10*3/MM3 (ref 0–0.05)
IMM GRANULOCYTES # BLD AUTO: 0.03 10*3/MM3 (ref 0–0.05)
IMM GRANULOCYTES NFR BLD AUTO: 0.5 % (ref 0–0.5)
IMM GRANULOCYTES NFR BLD AUTO: 0.7 % (ref 0–0.5)
KETONES UR QL STRIP: NEGATIVE
LEUKOCYTE ESTERASE UR QL STRIP.AUTO: NEGATIVE
LYMPHOCYTES # BLD AUTO: 1.57 10*3/MM3 (ref 0.7–3.1)
LYMPHOCYTES # BLD AUTO: 1.6 10*3/MM3 (ref 0.7–3.1)
LYMPHOCYTES NFR BLD AUTO: 35.7 % (ref 19.6–45.3)
LYMPHOCYTES NFR BLD AUTO: 36.4 % (ref 19.6–45.3)
MCH RBC QN AUTO: 32 PG (ref 26.6–33)
MCH RBC QN AUTO: 32.3 PG (ref 26.6–33)
MCHC RBC AUTO-ENTMCNC: 31.3 G/DL (ref 31.5–35.7)
MCHC RBC AUTO-ENTMCNC: 31.7 G/DL (ref 31.5–35.7)
MCV RBC AUTO: 101.8 FL (ref 79–97)
MCV RBC AUTO: 102.3 FL (ref 79–97)
MONOCYTES # BLD AUTO: 0.17 10*3/MM3 (ref 0.1–0.9)
MONOCYTES # BLD AUTO: 0.2 10*3/MM3 (ref 0.1–0.9)
MONOCYTES NFR BLD AUTO: 3.9 % (ref 5–12)
MONOCYTES NFR BLD AUTO: 4.5 % (ref 5–12)
NEUTROPHILS NFR BLD AUTO: 1.95 10*3/MM3 (ref 1.7–7)
NEUTROPHILS NFR BLD AUTO: 2.09 10*3/MM3 (ref 1.7–7)
NEUTROPHILS NFR BLD AUTO: 45.3 % (ref 42.7–76)
NEUTROPHILS NFR BLD AUTO: 46.6 % (ref 42.7–76)
NITRITE UR QL STRIP: NEGATIVE
NRBC BLD AUTO-RTO: 0 /100 WBC (ref 0–0.2)
NRBC BLD AUTO-RTO: 0 /100 WBC (ref 0–0.2)
PH UR STRIP.AUTO: <=5 [PH] (ref 4.5–8)
PLATELET # BLD AUTO: 263 10*3/MM3 (ref 140–450)
PLATELET # BLD AUTO: 265 10*3/MM3 (ref 140–450)
PMV BLD AUTO: 11.6 FL (ref 6–12)
PMV BLD AUTO: 12 FL (ref 6–12)
POTASSIUM SERPL-SCNC: 4.4 MMOL/L (ref 3.5–5.2)
PROT SERPL-MCNC: 7 G/DL (ref 6–8.5)
PROT UR QL STRIP: NEGATIVE
RBC # BLD AUTO: 2.2 10*6/MM3 (ref 3.77–5.28)
RBC # BLD AUTO: 2.22 10*6/MM3 (ref 3.77–5.28)
RH BLD: POSITIVE
SODIUM SERPL-SCNC: 139 MMOL/L (ref 136–145)
SP GR UR STRIP: 1.01 (ref 1–1.03)
T&S EXPIRATION DATE: NORMAL
TROPONIN T SERPL-MCNC: <0.01 NG/ML (ref 0–0.03)
UROBILINOGEN UR QL STRIP: NORMAL
WBC # BLD AUTO: 4.31 10*3/MM3 (ref 3.4–10.8)
WBC # BLD AUTO: 4.48 10*3/MM3 (ref 3.4–10.8)

## 2020-08-12 PROCEDURE — 86900 BLOOD TYPING SEROLOGIC ABO: CPT | Performed by: EMERGENCY MEDICINE

## 2020-08-12 PROCEDURE — U0004 COV-19 TEST NON-CDC HGH THRU: HCPCS | Performed by: EMERGENCY MEDICINE

## 2020-08-12 PROCEDURE — 36430 TRANSFUSION BLD/BLD COMPNT: CPT

## 2020-08-12 PROCEDURE — G0378 HOSPITAL OBSERVATION PER HR: HCPCS

## 2020-08-12 PROCEDURE — 80053 COMPREHEN METABOLIC PANEL: CPT | Performed by: EMERGENCY MEDICINE

## 2020-08-12 PROCEDURE — 93010 ELECTROCARDIOGRAM REPORT: CPT | Performed by: INTERNAL MEDICINE

## 2020-08-12 PROCEDURE — 63710000001 DIPHENHYDRAMINE PER 50 MG: Performed by: NURSE PRACTITIONER

## 2020-08-12 PROCEDURE — 85379 FIBRIN DEGRADATION QUANT: CPT | Performed by: EMERGENCY MEDICINE

## 2020-08-12 PROCEDURE — 86923 COMPATIBILITY TEST ELECTRIC: CPT

## 2020-08-12 PROCEDURE — 36415 COLL VENOUS BLD VENIPUNCTURE: CPT

## 2020-08-12 PROCEDURE — 85025 COMPLETE CBC W/AUTO DIFF WBC: CPT | Performed by: INTERNAL MEDICINE

## 2020-08-12 PROCEDURE — 81003 URINALYSIS AUTO W/O SCOPE: CPT | Performed by: EMERGENCY MEDICINE

## 2020-08-12 PROCEDURE — 99284 EMERGENCY DEPT VISIT MOD MDM: CPT | Performed by: EMERGENCY MEDICINE

## 2020-08-12 PROCEDURE — 63710000001 INSULIN DETEMIR PER 5 UNITS: Performed by: HOSPITALIST

## 2020-08-12 PROCEDURE — 86901 BLOOD TYPING SEROLOGIC RH(D): CPT | Performed by: EMERGENCY MEDICINE

## 2020-08-12 PROCEDURE — 82962 GLUCOSE BLOOD TEST: CPT

## 2020-08-12 PROCEDURE — C9803 HOPD COVID-19 SPEC COLLECT: HCPCS

## 2020-08-12 PROCEDURE — 85025 COMPLETE CBC W/AUTO DIFF WBC: CPT | Performed by: EMERGENCY MEDICINE

## 2020-08-12 PROCEDURE — 86900 BLOOD TYPING SEROLOGIC ABO: CPT

## 2020-08-12 PROCEDURE — 99284 EMERGENCY DEPT VISIT MOD MDM: CPT

## 2020-08-12 PROCEDURE — 86850 RBC ANTIBODY SCREEN: CPT | Performed by: EMERGENCY MEDICINE

## 2020-08-12 PROCEDURE — 84484 ASSAY OF TROPONIN QUANT: CPT | Performed by: EMERGENCY MEDICINE

## 2020-08-12 PROCEDURE — G0463 HOSPITAL OUTPT CLINIC VISIT: HCPCS

## 2020-08-12 PROCEDURE — 93005 ELECTROCARDIOGRAM TRACING: CPT | Performed by: EMERGENCY MEDICINE

## 2020-08-12 PROCEDURE — 99219 PR INITIAL OBSERVATION CARE/DAY 50 MINUTES: CPT | Performed by: HOSPITALIST

## 2020-08-12 PROCEDURE — P9016 RBC LEUKOCYTES REDUCED: HCPCS

## 2020-08-12 PROCEDURE — U0002 COVID-19 LAB TEST NON-CDC: HCPCS | Performed by: EMERGENCY MEDICINE

## 2020-08-12 RX ORDER — ATORVASTATIN CALCIUM 10 MG/1
10 TABLET, FILM COATED ORAL NIGHTLY
Status: DISCONTINUED | OUTPATIENT
Start: 2020-08-12 | End: 2020-08-13 | Stop reason: HOSPADM

## 2020-08-12 RX ORDER — DEXTROSE MONOHYDRATE 25 G/50ML
25 INJECTION, SOLUTION INTRAVENOUS
Status: DISCONTINUED | OUTPATIENT
Start: 2020-08-12 | End: 2020-08-13 | Stop reason: HOSPADM

## 2020-08-12 RX ORDER — DESVENLAFAXINE SUCCINATE 50 MG/1
50 TABLET, EXTENDED RELEASE ORAL DAILY
Status: DISCONTINUED | OUTPATIENT
Start: 2020-08-13 | End: 2020-08-13 | Stop reason: HOSPADM

## 2020-08-12 RX ORDER — LEVOTHYROXINE SODIUM 88 UG/1
88 TABLET ORAL
Status: DISCONTINUED | OUTPATIENT
Start: 2020-08-13 | End: 2020-08-13 | Stop reason: HOSPADM

## 2020-08-12 RX ORDER — ATROPA BELLADONNA AND OPIUM 16.2; 3 MG/1; MG/1
30 SUPPOSITORY RECTAL ONCE
Status: COMPLETED | OUTPATIENT
Start: 2020-08-12 | End: 2020-08-12

## 2020-08-12 RX ORDER — NICOTINE POLACRILEX 4 MG
15 LOZENGE BUCCAL
Status: DISCONTINUED | OUTPATIENT
Start: 2020-08-12 | End: 2020-08-13 | Stop reason: HOSPADM

## 2020-08-12 RX ORDER — MIDODRINE HYDROCHLORIDE 5 MG/1
5 TABLET ORAL
Status: DISCONTINUED | OUTPATIENT
Start: 2020-08-13 | End: 2020-08-13 | Stop reason: HOSPADM

## 2020-08-12 RX ORDER — FUROSEMIDE 20 MG/1
20 TABLET ORAL DAILY
Status: DISCONTINUED | OUTPATIENT
Start: 2020-08-13 | End: 2020-08-13 | Stop reason: HOSPADM

## 2020-08-12 RX ORDER — SODIUM CHLORIDE 9 MG/ML
INJECTION, SOLUTION INTRAVENOUS
Status: COMPLETED
Start: 2020-08-12 | End: 2020-08-12

## 2020-08-12 RX ORDER — ACETAMINOPHEN 325 MG/1
650 TABLET ORAL ONCE
Status: COMPLETED | OUTPATIENT
Start: 2020-08-12 | End: 2020-08-12

## 2020-08-12 RX ORDER — POTASSIUM CHLORIDE 750 MG/1
10 TABLET, FILM COATED, EXTENDED RELEASE ORAL DAILY
COMMUNITY
End: 2020-10-05

## 2020-08-12 RX ORDER — SODIUM CHLORIDE 9 MG/ML
INJECTION, SOLUTION INTRAVENOUS
Status: DISPENSED
Start: 2020-08-12 | End: 2020-08-13

## 2020-08-12 RX ORDER — FUROSEMIDE 20 MG/1
20 TABLET ORAL DAILY
COMMUNITY
End: 2020-10-02

## 2020-08-12 RX ORDER — GABAPENTIN 400 MG/1
800 CAPSULE ORAL NIGHTLY
Status: DISCONTINUED | OUTPATIENT
Start: 2020-08-12 | End: 2020-08-13 | Stop reason: HOSPADM

## 2020-08-12 RX ORDER — ACETAMINOPHEN 325 MG/1
650 TABLET ORAL EVERY 6 HOURS PRN
Status: DISCONTINUED | OUTPATIENT
Start: 2020-08-12 | End: 2020-08-13 | Stop reason: HOSPADM

## 2020-08-12 RX ORDER — DIPHENHYDRAMINE HCL 25 MG
25 CAPSULE ORAL EVERY 6 HOURS PRN
Status: DISCONTINUED | OUTPATIENT
Start: 2020-08-12 | End: 2020-08-13 | Stop reason: HOSPADM

## 2020-08-12 RX ADMIN — ATORVASTATIN CALCIUM 10 MG: 10 TABLET, FILM COATED ORAL at 22:42

## 2020-08-12 RX ADMIN — APIXABAN 5 MG: 2.5 TABLET, FILM COATED ORAL at 22:42

## 2020-08-12 RX ADMIN — ATROPA BELLADONNA AND OPIUM 30 MG: 16.2; 3 SUPPOSITORY RECTAL at 16:58

## 2020-08-12 RX ADMIN — ACETAMINOPHEN 650 MG: 325 TABLET, FILM COATED ORAL at 16:58

## 2020-08-12 RX ADMIN — GABAPENTIN 800 MG: 400 CAPSULE ORAL at 22:42

## 2020-08-12 RX ADMIN — INSULIN DETEMIR 25 UNITS: 100 INJECTION, SOLUTION SUBCUTANEOUS at 22:43

## 2020-08-12 RX ADMIN — DIPHENHYDRAMINE HYDROCHLORIDE 25 MG: 25 CAPSULE ORAL at 23:54

## 2020-08-12 RX ADMIN — SODIUM CHLORIDE 250 ML: 9 INJECTION, SOLUTION INTRAVENOUS at 16:34

## 2020-08-12 RX ADMIN — ACETAMINOPHEN 650 MG: 325 TABLET, FILM COATED ORAL at 22:42

## 2020-08-12 NOTE — ED PROVIDER NOTES
" EMERGENCY DEPARTMENT ENCOUNTER    Room Number:  4/04  Date of encounter:  8/12/2020  PCP: Chari Mercado MD  Historian: Patient     I used full protective equipment while examining this patient.  This includes face mask, gloves and protective eyewear.  I washed my hands before entering the room and immediately upon leaving the room      HPI:  Chief Complaint: Syncope  A complete HPI/ROS/PMH/PSH/SH/FH are unobtainable due to: None    Context: Joya Singh is a 80 y.o. female who presents to the ED c/o syncope.  Patient was at [facility] office today to get a Procrit injection for her chronic myelodysplasia.  She was in the wheelchair when she blacked out briefly.  She was brought here to the ED for further evaluation.  Patient states she just feels \"bad\" and lightheaded.  Symptoms are moderate and worsened by nothing, improved by nothing.  She denies black or tarry stools but does take Eliquis as anticoagulation due to history of blood clots.  She has a history of myelodysplasia and hence the reason for her Procrit injection today.  Patient was hospitalized in late July with urinary tract infection and altered mental status.      PAST MEDICAL HISTORY  Active Ambulatory Problems     Diagnosis Date Noted   • Deep vein thrombosis of lower extremity (CMS/Formerly [facility]) 02/02/2016   • Arthritis 02/02/2016   • Chronic back pain 02/02/2016   • Chronic anemia 02/02/2016   • Type 2 diabetes mellitus with neurologic complication (CMS/Formerly [facility]) 02/02/2016   • Brittle diabetes mellitus (CMS/Formerly [facility]) 02/02/2016   • Hyperlipidemia 02/02/2016   • Hypertension 02/02/2016   • Insomnia with sleep apnea 02/02/2016   • Obstructive sleep apnea syndrome 02/02/2016   • Peripheral neuropathy 02/02/2016   • Diabetic gastroparesis (CMS/Formerly [facility]) 02/19/2016   • Primary localized osteoarthrosis of left shoulder region 06/08/2016   • Rotator cuff tendonitis 06/08/2016   • Acquired hypothyroidism 11/23/2016   • Anxiety 12/15/2016   • Gastroesophageal reflux disease " 12/15/2016   • History of biliary T-tube placement 12/15/2016   • 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) 02/28/2017   • Complex tear of medial meniscus of right knee as current injury 03/28/2017   • Insufficiency fracture of tibia 03/28/2017   • Primary osteoarthritis of left knee 05/09/2017   • Orthostatic hypotension 12/05/2017   • Tendinitis of right rotator cuff 06/25/2018   • AC joint arthropathy 06/25/2018   • Subacromial impingement of right shoulder 06/25/2018   • Right carpal tunnel syndrome 07/17/2018   • Anemia requiring transfusions 11/26/2018   • Monoclonal gammopathy of unknown significance (MGUS) 03/27/2019   • Myelodysplastic syndrome with 5q deletion (CMS/HCC) 03/27/2019   • History of deep venous thrombosis (DVT) of distal vein of left lower extremity 07/16/2019   • Moderate episode of recurrent major depressive disorder (CMS/HCC) 07/16/2019   • Chronic diastolic CHF (congestive heart failure) (CMS/HCC) 08/02/2019   • Knee pain 09/16/2019   • Primary osteoarthritis of right knee 10/18/2019   • Morbidly obese (CMS/HCC) 01/29/2020   • Iron overload, transfusional 04/22/2020   • Confusion 07/29/2020     Resolved Ambulatory Problems     Diagnosis Date Noted   • Abdominal pain 02/02/2016   • Abnormal finding on thyroid function test 02/02/2016   • Abnormal finding in urine 02/02/2016   • Transient global amnesia 02/02/2016   • Cellulitis 02/02/2016   • Constipation 02/02/2016   • Well controlled diabetes mellitus (CMS/HCC) 02/02/2016   • Cough 02/02/2016   • Depression 02/02/2016   • Diarrhea 02/02/2016   • Difficulty in urination 02/02/2016   • Dizziness 02/02/2016   • Dysuria 02/02/2016   • Fall in home 02/02/2016   • Fatigue 02/02/2016   • Spasm 02/02/2016   • Nasal congestion 02/02/2016   • Nausea 02/02/2016   • Poor balance 02/02/2016   • Acid reflux 02/02/2016   • Foot pain 02/02/2016   • Shortness of breath 02/02/2016   • Speech disturbance 02/02/2016   • Sinusitis 02/02/2016   •  Swelling of lower extremity 02/02/2016   • Thumb pain 02/02/2016   • Urinary tract infection 02/02/2016   • Viral infection 02/02/2016   • Warfarin anticoagulation 02/10/2016   • Grief 02/19/2016   • Stress at home 07/19/2016   • Warfarin toxicity 10/06/2016   • Encounter for other specified aftercare 12/15/2016   • Left leg pain 02/28/2017   • Subtherapeutic anticoagulation 02/28/2017   • Mechanical knee pain 02/28/2017   • Hypoxia 11/03/2017   • Hyperkalemia 05/23/2018   • Community acquired pneumonia of left lung 10/25/2018   • Gastrointestinal hemorrhage 10/25/2018   • Acute blood loss anemia 11/19/2018   • Gout attack 11/23/2018     Past Medical History:   Diagnosis Date   • Allergic rhinitis    • Anemia    • Appetite absent    • Asthma    • Back pain    • Bell's palsy    • Black tarry stools    • Blood in stool    • Chronic fatigue    • CKD (chronic kidney disease) stage 3, GFR 30-59 ml/min (CMS/Formerly [facility])    • Diabetes mellitus (CMS/Formerly [facility])    • Difficulty walking    • Excessive urination at night    • Frequent urination    • GERD (gastroesophageal reflux disease)    • GI bleed    • Gout    • H/O blood clots    • Heat intolerance    • History of fall 10/2018   • History of prior pregnancies    • History of transfusion 11/2018   • Hypothyroidism    • Normal coronary arteries    • AARON (obstructive sleep apnea)    • PONV (postoperative nausea and vomiting)    • Skin cancer    • Stroke (CMS/Formerly [facility])    • TIA (transient ischemic attack)    • Urination pain    • UTI (urinary tract infection)          PAST SURGICAL HISTORY  Past Surgical History:   Procedure Laterality Date   • BACK SURGERY      HARDWARE   • CHOLECYSTECTOMY      OPEN   • COLONOSCOPY  2011    due for repeat in 2021   • COLONOSCOPY N/A 10/28/2018    Procedure: COLONOSCOPY;  Surgeon: Emmanuel Rogers MD;  Location: [facility];  Service: Gastroenterology   • ENDOSCOPY N/A 10/26/2018    Procedure: ESOPHAGOGASTRODUODENOSCOPY;  Surgeon: Emmanuel Rogers  MD Elvia;  Location: [facility] OR;  Service: Gastroenterology   • HYSTERECTOMY      PARTIAL    • KNEE SURGERY     • NECK SURGERY     • SPINE SURGERY     • TUMOR REMOVAL Left     Leg   • UPPER GASTROINTESTINAL ENDOSCOPY  2014    gastritis.  done by dr. hastings         FAMILY HISTORY  Family History   Problem Relation Age of Onset   • Lupus Mother    • Heart failure Mother 59   • Heart disease Other    • Hypertension Other    • Heart attack Father    • Breast cancer Neg Hx          SOCIAL HISTORY  Social History     Socioeconomic History   • Marital status:      Spouse name: Not on file   • Number of children: 3   • Years of education: High School   • Highest education level: Not on file   Occupational History     Employer: RETIRED   Tobacco Use   • Smoking status: Never Smoker   • Smokeless tobacco: Never Used   • Tobacco comment: CAFFEINE USE: NONE   Substance and Sexual Activity   • Alcohol use: No   • Drug use: No   • Sexual activity: Defer     Comment: EXERCISE - RARELY         ALLERGIES  Baclofen; Codeine; Lisinopril; Morphine; and Penicillins       REVIEW OF SYSTEMS  Review of Systems   Constitutional: Negative.  Negative for fever.   HENT: Negative for sore throat.    Eyes: Negative.    Respiratory: Positive for cough (Chronic) and shortness of breath.    Cardiovascular: Positive for chest pain (Off and on over the last several days lasting seconds at a time, currently gone).   Gastrointestinal: Positive for abdominal pain.   Genitourinary: Positive for dysuria.   Musculoskeletal: Positive for arthralgias and back pain.   Skin: Negative.  Negative for rash.   Neurological: Positive for headaches.   All other systems reviewed and are negative.          PHYSICAL EXAM    I have reviewed the triage vital signs and nursing notes.    ED Triage Vitals   Temp Pulse Resp BP SpO2   -- -- -- -- --      Temp src Heart Rate Source Patient Position BP Location FiO2 (%)   -- -- -- -- --       Physical Exam  GENERAL:  Chronically ill-appearing female in no obvious distress, nontoxic appearance  HENT: nares patent, atraumatic  EYES: no scleral icterus  CV: regular rhythm, regular rate-no murmur  RESPIRATORY: normal effort, clear to auscultation bilaterally-saturation 97% on room air  ABDOMEN: soft, morbidly obese-mild diffuse tenderness without rebound or guarding  MUSCULOSKELETAL: Chronic appearing swelling bilateral lower extremities- no significant tenderness to palpation  NEURO: Strength, sensation, and coordination are grossly intact.  Speech and mentation are unremarkable  SKIN: warm, dry-no unusual rashes are noted      LAB RESULTS  Recent Results (from the past 24 hour(s))   CBC Auto Differential    Collection Time: 08/12/20  9:37 AM   Result Value Ref Range    WBC 4.31 3.40 - 10.80 10*3/mm3    RBC 2.22 (L) 3.77 - 5.28 10*6/mm3    Hemoglobin 7.1 (L) 12.0 - 15.9 g/dL    Hematocrit 22.7 (L) 34.0 - 46.6 %    .3 (H) 79.0 - 97.0 fL    MCH 32.0 26.6 - 33.0 pg    MCHC 31.3 (L) 31.5 - 35.7 g/dL    RDW 21.6 (H) 12.3 - 15.4 %    RDW-SD 79.7 (H) 37.0 - 54.0 fl    MPV 11.6 6.0 - 12.0 fL    Platelets 265 140 - 450 10*3/mm3    Neutrophil % 45.3 42.7 - 76.0 %    Lymphocyte % 36.4 19.6 - 45.3 %    Monocyte % 3.9 (L) 5.0 - 12.0 %    Eosinophil % 12.3 (H) 0.3 - 6.2 %    Basophil % 1.6 (H) 0.0 - 1.5 %    Immature Grans % 0.5 0.0 - 0.5 %    Neutrophils, Absolute 1.95 1.70 - 7.00 10*3/mm3    Lymphocytes, Absolute 1.57 0.70 - 3.10 10*3/mm3    Monocytes, Absolute 0.17 0.10 - 0.90 10*3/mm3    Eosinophils, Absolute 0.53 (H) 0.00 - 0.40 10*3/mm3    Basophils, Absolute 0.07 0.00 - 0.20 10*3/mm3    Immature Grans, Absolute 0.02 0.00 - 0.05 10*3/mm3    nRBC 0.0 0.0 - 0.2 /100 WBC   Comprehensive Metabolic Panel    Collection Time: 08/12/20 11:07 AM   Result Value Ref Range    Glucose 113 (H) 65 - 99 mg/dL    BUN 26 (H) 8 - 23 mg/dL    Creatinine 1.84 (H) 0.57 - 1.00 mg/dL    Sodium 139 136 - 145 mmol/L    Potassium 4.4 3.5 - 5.2 mmol/L     Chloride 103 98 - 107 mmol/L    CO2 26.4 22.0 - 29.0 mmol/L    Calcium 8.8 8.6 - 10.5 mg/dL    Total Protein 7.0 6.0 - 8.5 g/dL    Albumin 3.80 3.50 - 5.20 g/dL    ALT (SGPT) 35 (H) 1 - 33 U/L    AST (SGOT) 28 1 - 32 U/L    Alkaline Phosphatase 153 (H) 39 - 117 U/L    Total Bilirubin 0.5 0.0 - 1.2 mg/dL    eGFR Non African Amer 27 (L) >60 mL/min/1.73    Globulin 3.2 gm/dL    A/G Ratio 1.2 g/dL    BUN/Creatinine Ratio 14.1 7.0 - 25.0    Anion Gap 9.6 5.0 - 15.0 mmol/L   Troponin    Collection Time: 08/12/20 11:07 AM   Result Value Ref Range    Troponin T <0.010 0.000 - 0.030 ng/mL   CBC Auto Differential    Collection Time: 08/12/20 11:07 AM   Result Value Ref Range    WBC 4.48 3.40 - 10.80 10*3/mm3    RBC 2.20 (L) 3.77 - 5.28 10*6/mm3    Hemoglobin 7.1 (L) 12.0 - 15.9 g/dL    Hematocrit 22.4 (L) 34.0 - 46.6 %    .8 (H) 79.0 - 97.0 fL    MCH 32.3 26.6 - 33.0 pg    MCHC 31.7 31.5 - 35.7 g/dL    RDW 21.8 (H) 12.3 - 15.4 %    RDW-SD 79.9 (H) 37.0 - 54.0 fl    MPV 12.0 6.0 - 12.0 fL    Platelets 263 140 - 450 10*3/mm3    Neutrophil % 46.6 42.7 - 76.0 %    Lymphocyte % 35.7 19.6 - 45.3 %    Monocyte % 4.5 (L) 5.0 - 12.0 %    Eosinophil % 11.2 (H) 0.3 - 6.2 %    Basophil % 1.3 0.0 - 1.5 %    Immature Grans % 0.7 (H) 0.0 - 0.5 %    Neutrophils, Absolute 2.09 1.70 - 7.00 10*3/mm3    Lymphocytes, Absolute 1.60 0.70 - 3.10 10*3/mm3    Monocytes, Absolute 0.20 0.10 - 0.90 10*3/mm3    Eosinophils, Absolute 0.50 (H) 0.00 - 0.40 10*3/mm3    Basophils, Absolute 0.06 0.00 - 0.20 10*3/mm3    Immature Grans, Absolute 0.03 0.00 - 0.05 10*3/mm3    nRBC 0.0 0.0 - 0.2 /100 WBC   D-dimer, Quantitative    Collection Time: 08/12/20 11:08 AM   Result Value Ref Range    D-Dimer, Quantitative 0.65 (H) 0.00 - 0.46 MCGFEU/mL   Urinalysis With Microscopic If Indicated (No Culture) - Urine, Clean Catch    Collection Time: 08/12/20 11:32 AM   Result Value Ref Range    Color, UA Yellow Yellow, Straw    Appearance, UA Clear Clear    pH, UA  "<=5.0 4.5 - 8.0    Specific Gravity, UA 1.010 1.003 - 1.030    Glucose, UA Negative Negative    Ketones, UA Negative Negative    Bilirubin, UA Negative Negative    Blood, UA Negative Negative    Protein, UA Negative Negative    Leuk Esterase, UA Negative Negative    Nitrite, UA Negative Negative    Urobilinogen, UA 0.2 E.U./dL 0.2 - 1.0 E.U./dL       Ordered the above labs and independently reviewed the results.      RADIOLOGY  No Radiology Exams Resulted Within Past 24 Hours    I ordered the above noted radiological studies. Reviewed by me and discussed with radiologist.  See dictation for official radiology interpretation.      PROCEDURES  Procedures      MEDICATIONS GIVEN IN ER    Medications - No data to display      PROGRESS, DATA ANALYSIS, CONSULTS, AND MEDICAL DECISION MAKING    All labs have been independently reviewed by me.  All radiology studies have been reviewed by me and discussed with radiologist dictating the report.   EKG's independently viewed and interpreted by me.  Discussion below represents my analysis of pertinent findings related to patient's condition, differential diagnosis, treatment plan and final disposition.      ED Course as of Aug 12 1233   Wed Aug 12, 2020   1032 MDM-reviewed recent admission for confusion related to UTI.  This is summarized below:    History of Present Illness:  Ms. Singh is a pleasant, 79 y/o  female who was brought in to the ER today for confusion.  Ms. Singh reports she came in because of abdominal pain, but said she also had trouble remembering her name.  She describes a 1 week history of  Sharp abdominal pain in the suprapubic area that was exacerbated by urination.  \"It burns like fire when I pee\".  She did not try anything to relieve her symptoms, but she did seek care by calling her PMD office and asking for UTI therapy.  She was prescribed Cefdinir, but it doesn't seem to be working.  She notes some associated nausea from pain, but no fever/chills. "  No chest pain or dyspnea.  She denies diarrhea.  Her son is not available to describe the confusion further.  She has been appropriate w/ staff this afternoon since admission.    [DB]   1038 EKG          EKG time: 1037  Rhythm/Rate: Sinus 65  P waves and CA: Normal P waves and CA intervals  QRS, axis: Left axis deviation, right bundle branch block  ST and T waves: Inverted T waves anteriorly    Interpreted Contemporaneously by me, independently viewed  Not significantly changed from 2019      [DB]   1200 Labs are reviewed and notable for hemoglobin drop of 7.1 which is down slightly more than 2 points from 2 weeks ago.  This could be related to myelodysplastic syndrome or blood loss anemia as patient is anticoagulated on Eliquis.  Given this drop of hemoglobin will consult hospitalist for likely admission and possible blood transfusion.  Creatinine of 1.84 is close to baseline and not felt to represent dehydration or acute renal failure.  D-dimer of 0.65 is within normal limits using age related criteria patient is low risk for pulmonary embolism as she is already anticoagulated on Eliquis.  Urinalysis is unremarkable despite recent urinary tract infection and does not bear treatment at this time.    [DB]   1212 I discussed patient's evaluation with Dr. RT Guzman who requests that I contact [facility] to see if they want patient admitted for blood transfusion.    [DB]   1229 I discussed evaluation of this patient with the [facility].  They did request the patient be admitted to the hospital received 2 units of packed red blood cells.  It was noted that patient did not get her Procrit injection at [facility] today.    [DB]   1232 Patient did get bedside Hemoccult which was negative for blood.  Test performed by me.    [DB]      ED Course User Index  [DB] Luis Angel Patricia MD       AS OF 12:33 VITALS:    BP - 138/76  HR - 69  TEMP - 98.7 °F (37.1 °C) (Oral)  O2 SATS - 99%      DIAGNOSIS  Final diagnoses:   Syncope, unspecified  syncope type   Symptomatic anemia         DISPOSITION  Admission         Luis Angel Patricia MD  08/12/20 4113

## 2020-08-12 NOTE — OUTREACH NOTE
Medical Week 2 Survey      Responses   [facility] patient discharged from?  [facility]   COVID-19 Test Status  Negative   Does the patient have one of the following disease processes/diagnoses(primary or secondary)?  Other   Week 2 attempt successful?  Yes   Call start time  1400   Discharge diagnosis  Lower abd pain with confusion   Revoke  Readmitted [Son reports patient has been readmitted ]   Call end time  1401          Joie Keane RN

## 2020-08-12 NOTE — H&P
[facility] GROUP HOSPITALIST     Chari Mercado MD    CHIEF COMPLAINT:  Syncope    HISTORY OF PRESENT ILLNESS:    Ms. Singh is a pleasant 79 y/o  female who was presenting to the [facility] group today to receive her Procrit shot when she had a syncopal episode while sitting in her wheelchair,  She was out only for seconds.  She notes the continue suprapubic discomfort I saw her for during her last stay.  She denies fever and chills.  She denies chest pain.  She denies nausea.  She is also currently complaining of a headache.  She was heme negative in the ER.        Past Medical History:   Diagnosis Date   • Allergic rhinitis    • Anemia    • Anxiety    • Appetite absent    • Arthritis    • Asthma    • Back pain    • Bell's palsy    • Black tarry stools    • Blood in stool    • Chronic fatigue    • CKD (chronic kidney disease) stage 3, GFR 30-59 ml/min (CMS/[facility])    • Community acquired pneumonia of left lung 10/25/2018   • Cough    • Depression    • Diabetes mellitus (CMS/[facility])     LAST A1C 6   • Diabetic gastroparesis (CMS/[facility]) 2/19/2016   • Difficulty walking    • Excessive urination at night    • Frequent urination    • GERD (gastroesophageal reflux disease)    • GI bleed    • Gout    • H/O blood clots     LEFT LEG 7 OR 8 YEARS AGO   • Heat intolerance    • History of fall 10/2018   • History of prior pregnancies     x8, miscarriage 5   • History of transfusion 11/2018    due to anemia   • Hyperlipidemia    • Hypertension    • Hypothyroidism    • Normal coronary arteries     by cath 2013   • Orthostatic hypotension    • AARON (obstructive sleep apnea)     DOESNT WEAR REGULARLY   • PONV (postoperative nausea and vomiting)    • Skin cancer    • Stroke (CMS/[facility])     Several mini-strokes   • TIA (transient ischemic attack)     LAST TIA JULY 2017   • Urination pain    • UTI (urinary tract infection)     Dec 2018 and Jan 2019     Past Surgical History:   Procedure Laterality Date   • BACK SURGERY       HARDWARE   • CHOLECYSTECTOMY      OPEN   • COLONOSCOPY  2011    due for repeat in 2021   • COLONOSCOPY N/A 10/28/2018    Procedure: COLONOSCOPY;  Surgeon: Emmanuel Rogers MD;  Location: [facility] OR;  Service: Gastroenterology   • ENDOSCOPY N/A 10/26/2018    Procedure: ESOPHAGOGASTRODUODENOSCOPY;  Surgeon: Emmanuel Rogers MD;  Location:  [facility] OR;  Service: Gastroenterology   • HYSTERECTOMY      PARTIAL    • KNEE SURGERY     • NECK SURGERY     • SPINE SURGERY     • TUMOR REMOVAL Left     Leg   • UPPER GASTROINTESTINAL ENDOSCOPY  2014    gastritis.  done by dr. hastings     Family History   Problem Relation Age of Onset   • Lupus Mother    • Heart failure Mother 59   • Heart disease Other    • Hypertension Other    • Heart attack Father    • Breast cancer Neg Hx      Social History     Tobacco Use   • Smoking status: Never Smoker   • Smokeless tobacco: Never Used   • Tobacco comment: CAFFEINE USE: NONE   Substance Use Topics   • Alcohol use: No   • Drug use: No     Medications Prior to Admission   Medication Sig Dispense Refill Last Dose   • acetaminophen (TYLENOL) 325 MG tablet Take 2 tablets by mouth Every 6 (Six) Hours As Needed for Mild Pain . OTC product 40 tablet 0 Taking   • albuterol sulfate  (90 Base) MCG/ACT inhaler Inhale 2 puffs Every 4 (Four) Hours As Needed for Wheezing. 1 inhaler 3 Taking   • atorvastatin (LIPITOR) 10 MG tablet TAKE ONE TABLET BY MOUTH AT BEDTIME 90 tablet 1 Taking   • Blood Glucose Monitoring Suppl (ACCU-CHEK KENNETH SMARTVIEW) w/Device kit TEST blood sugar three times daily or as directed 1 kit 0 Taking   • Continuous Blood Gluc  (FREESTYLE MATILDA 14 DAY READER) device 1 each 6 (Six) Times a Day. 1 Device 0 Taking   • Continuous Blood Gluc Sensor (FREESTYLE MATILDA 14 DAY SENSOR) misc 1 each 6 (Six) Times a Day. 2 each 11 Taking   • desvenlafaxine (PRISTIQ) 50 MG 24 hr tablet TAKE ONE TABLET BY MOUTH EVERY DAY 90 tablet 1 Taking   • diphenhydrAMINE  "(BENADRYL) 25 mg capsule Take 25 mg by mouth Every 6 (Six) Hours As Needed for Itching.   Taking   • ELIQUIS 5 MG tablet tablet TAKE ONE TABLET BY MOUTH TWICE DAILY 180 tablet 0 Taking   • furosemide (LASIX) 20 MG tablet Take 20 mg by mouth Daily.      • gabapentin (NEURONTIN) 400 MG capsule TAKE ONE CAPSULE BY MOUTH EVERY MORNING, AT NOON, AND TWO AT BEDTIME 120 capsule 2 Taking   • insulin aspart (novoLOG) 100 UNIT/ML injection Inject 5 Units under the skin into the appropriate area as directed 3 (Three) Times a Day With Meals.  12 Taking   • levothyroxine (SYNTHROID, LEVOTHROID) 88 MCG tablet TAKE ONE TABLET BY MOUTH EVERY DAY 90 tablet 1 Taking   • midodrine (PROAMATINE) 5 MG tablet Take 1 tablet by mouth 3 (Three) Times a Day Before Meals. 90 tablet 0 Taking   • Needle, Disp, (BD DISP NEEDLES) 30G X 1/2\" misc To be used 3 times daily with Novolog Flexpen. 100 each 5 Taking   • nystatin (MYCOSTATIN) 118941 UNIT/GM powder Apply  topically to the appropriate area as directed 2 (Two) Times a Day. 60 g 2 Taking   • omeprazole (priLOSEC) 40 MG capsule TAKE ONE CAPSULE BY MOUTH EVERY DAY 90 capsule 1 Taking   • potassium chloride (K-DUR) 10 MEQ CR tablet Take 10 mEq by mouth Daily.      • TRESIBA FLEXTOUCH 200 UNIT/ML solution pen-injector pen injection INJECT 54 UNITS subcutaneously AT BEDTIME 9 mL 11 Taking   • ULTICARE MINI PEN NEEDLES 31G X 6 MM misc USE TO INJECT INSULINS 4 TIMES DAILY AS DIRECTED 400 each 3 Taking   • ACCU-CHEK FASTCLIX LANCETS misc TEST 3-4 TIMES DAILY AS DIRECTED 400 each 3 Taking   • ACCU-CHEK SMARTVIEW test strip TEST BLOOD SUGAR THREE TIMES DAILY OR AS DIRECTED 300 each 3 Taking   • O2 (OXYGEN) Inhale 2 L/min Every Night. Uses 2L  overnight only   Taking     Allergies:  Baclofen; Codeine; Lisinopril; Morphine; and Penicillins    REVIEW OF SYSTEMS:  Please see the above history of present illness for pertinent positives and negatives.  The remainder of the patient's systems have been " "reviewed and are negative.    Vital Signs  Temp:  [98.5 °F (36.9 °C)-98.7 °F (37.1 °C)] 98.7 °F (37.1 °C)  Heart Rate:  [69-77] 69  Resp:  [16-22] 18  BP: (133-170)/(44-76) 138/76  Oxygen Therapy  SpO2: 99 %  Pulse Oximetry Type: Continuous  Device (Oxygen Therapy): room air  Device (Oxygen Therapy): room air}  Body mass index is 37.41 kg/m².  Flowsheet Rows      First Filed Value   Admission Height  154.9 cm (61\") Documented at 08/12/2020 1032   Admission Weight  89.8 kg (198 lb) Documented at 08/12/2020 1032             Physical Exam:  Physical Exam   Constitutional: Patient appears well-developed and well-nourished and in no acute distress   HEENT:   Head: Normocephalic and atraumatic.   Eyes:  Pupils are equal, round, and reactive to light. EOM are intact. Sclerae are anicteric and noninjected.  Mild conjunctival pallor bilaterally.  Mouth and Throat: Patient has moist mucous membranes. Oropharynx is clear of any erythema or exudate.     Cardiovascular: Regular rate, regular rhythm, S1 normal and S2 normal.  Exam reveals no gallop and no friction rub.  No murmur heard.  Pulmonary/Chest: Lungs are diminished to auscultation bilaterally. No respiratory distress. No wheezes. No rhonchi. No rales.   Abdominal: Obese. Soft. Bowel sounds are normal. There is no tenderness.   Musculoskeletal: Normal muscle tone  Extremities: No edema.   Neurological: Cranial nerves II-XII are grossly intact with no focal deficits.    Emotional Behavior:    Judgement and Insight: Normal   Mental Status:  Alertness  Normal   Memory:  Normal   Mood and Affect:         Depression  None               Anxiety  None    Debilities:   Physical Weakness  None   Handicaps  None   Disabilities  None   Agitation  None     Results Review:    I reviewed the patient's new clinical results.  Lab Results (most recent)     Procedure Component Value Units Date/Time    COVID PRE-OP / PRE-PROCEDURE SCREENING ORDER (NO ISOLATION) - Swab, Nasopharynx " [[id number]] Collected:  08/12/20 1302    Specimen:  Swab from Nasopharynx Updated:  08/12/20 1305    Narrative:       The following orders were created for panel order COVID PRE-OP / PRE-PROCEDURE SCREENING ORDER (NO ISOLATION) - Swab, Nasopharynx.  Procedure                               Abnormality         Status                     ---------                               -----------         ------                     COVID-19,[facility], NP ...[[id number]]                      In process                   Please view results for these tests on the individual orders.    COVID-19,[facility], NP SWAB IN [facility] VIRAL TRANSPORT MEDIA 24-30 HR TAT - Swab, Nasopharynx [[id number]] Collected:  08/12/20 1302    Specimen:  Swab from Nasopharynx Updated:  08/12/20 1305    Urinalysis With Microscopic If Indicated (No Culture) - Urine, Clean Catch [[id number]]  (Normal) Collected:  08/12/20 1132    Specimen:  Urine, Clean Catch Updated:  08/12/20 1141     Color, UA Yellow     Appearance, UA Clear     pH, UA <=5.0     Specific Gravity, UA 1.010     Glucose, UA Negative     Ketones, UA Negative     Bilirubin, UA Negative     Blood, UA Negative     Protein, UA Negative     Leuk Esterase, UA Negative     Nitrite, UA Negative     Urobilinogen, UA 0.2 E.U./dL    Narrative:       Urine microscopic not indicated.    Troponin [[id number]]  (Normal) Collected:  08/12/20 1107    Specimen:  Blood Updated:  08/12/20 1139     Troponin T <0.010 ng/mL     Narrative:       Troponin T Reference Range:  <= 0.03 ng/mL-   Negative for AMI  >0.03 ng/mL-     Abnormal for myocardial necrosis.  Clinicians would have to utilize clinical acumen, EKG, Troponin and serial changes to determine if it is an Acute Myocardial Infarction or myocardial injury due to an underlying chronic condition.       Results may be falsely decreased if patient taking Biotin.      Comprehensive Metabolic Panel [[id number]]  (Abnormal) Collected:  08/12/20 1107    Specimen:  Blood  Updated:  08/12/20 1136     Glucose 113 mg/dL      BUN 26 mg/dL      Creatinine 1.84 mg/dL      Sodium 139 mmol/L      Potassium 4.4 mmol/L      Chloride 103 mmol/L      CO2 26.4 mmol/L      Calcium 8.8 mg/dL      Total Protein 7.0 g/dL      Albumin 3.80 g/dL      ALT (SGPT) 35 U/L      AST (SGOT) 28 U/L      Alkaline Phosphatase 153 U/L      Total Bilirubin 0.5 mg/dL      eGFR Non African Amer 27 mL/min/1.73      Globulin 3.2 gm/dL      A/G Ratio 1.2 g/dL      BUN/Creatinine Ratio 14.1     Anion Gap 9.6 mmol/L     Narrative:       GFR Normal >60  Chronic Kidney Disease <60  Kidney Failure <15      D-dimer, Quantitative [[id number]]  (Abnormal) Collected:  08/12/20 1108    Specimen:  Blood Updated:  08/12/20 1132     D-Dimer, Quantitative 0.65 MCGFEU/mL     Narrative:       Can be elevated in, but is not diagnostic for deep vein thrombosis (DVT) or pulmonary embolis (PE).  It is also elevated in other medical conditions.  Clinical correlation is required.  The negative cut-off value for the D-Dimer is 0.50 mcg FEU/mL for DVT and PE.      CBC & Differential [[id number]] Collected:  08/12/20 1107    Specimen:  Blood Updated:  08/12/20 1129    Narrative:       The following orders were created for panel order CBC & Differential.  Procedure                               Abnormality         Status                     ---------                               -----------         ------                     CBC Auto Differential[[id number]]        Abnormal            Final result                 Please view results for these tests on the individual orders.    CBC Auto Differential [[id number]]  (Abnormal) Collected:  08/12/20 1107    Specimen:  Blood Updated:  08/12/20 1129     WBC 4.48 10*3/mm3      RBC 2.20 10*6/mm3      Hemoglobin 7.1 g/dL      Hematocrit 22.4 %      .8 fL      MCH 32.3 pg      MCHC 31.7 g/dL      RDW 21.8 %      RDW-SD 79.9 fl      MPV 12.0 fL      Platelets 263 10*3/mm3      Neutrophil % 46.6 %       Lymphocyte % 35.7 %      Monocyte % 4.5 %      Eosinophil % 11.2 %      Basophil % 1.3 %      Immature Grans % 0.7 %      Neutrophils, Absolute 2.09 10*3/mm3      Lymphocytes, Absolute 1.60 10*3/mm3      Monocytes, Absolute 0.20 10*3/mm3      Eosinophils, Absolute 0.50 10*3/mm3      Basophils, Absolute 0.06 10*3/mm3      Immature Grans, Absolute 0.03 10*3/mm3      nRBC 0.0 /100 WBC           Imaging Results (Most Recent)     None          Assessment/Plan     1.  Syncopal Episode: Etiology unclear.  Too short to be a seizure (and no seizure history).  No orthostatics checked, but she was sitting when this happened.  Vagal response?  Will monitor on tele and exclude AMI.    2.  Anemia: She has a history of MDS treated w/ Procrit.  She did not receive dose today.  Asked to transfuse 2 units.    3.  Bladder Spasms: Previous success w/ B&O suppositories, however they were cost-prohibitive at discharge.  Will re-order.  Need to che for Urology follow-up from last stay.    4.  CKDIII: Creatinine about baseline.    5.  Diabetes Mellitus, Type 2 in Obese: Add sliding scale to home basal dose.    I discussed the patients findings and my recommendations with patient.     Elton Guzman MD  08/12/20  15:37

## 2020-08-12 NOTE — NURSING NOTE
Pt arrived today for Labs with possible procrit injection. Pt very weak, short of breath, pale, and skin is cool to touch. Pt Hgb 7.1 today down from 9.2 one week ago. Pt states she had sudden onset of weakness and shortness of breath in the last 3 days and it progressively got worse. Reviewed labs and symptoms with REY Kim and received orders to take pt to the ER. After pt was informed that she needed to go  the ER the pt lost consciousness for about 10 secs. Once pt was awake again pt was alert and oriented but groggy. Pt was transported to the ER per wheelchair with Yasemin  and this nurse at 1025 am. Pt was taken to ER room 4 and report was given to ER RN.

## 2020-08-12 NOTE — ED NOTES
Per CBC, pt did NOT get procrit today and to go ahead with transfusion.  Dr Patricia aware.     Idalmis Ojeda, RN  08/12/20 1226       Idalmis Ojeda, RN  08/12/20 1229

## 2020-08-12 NOTE — PLAN OF CARE
Problem: Patient Care Overview  Goal: Plan of Care Review  Outcome: Ongoing (interventions implemented as appropriate)  Flowsheets  Taken 8/12/2020 1813  Progress: no change  Outcome Summary: Patient here receiving 1st unit of blood. HGB 7.1  Complained of H/A gave tylenol and belladona supp. Patient had releif.  Taken 8/12/2020 1528  Plan of Care Reviewed With: patient

## 2020-08-13 ENCOUNTER — READMISSION MANAGEMENT (OUTPATIENT)
Dept: CALL CENTER | Facility: HOSPITAL | Age: 80
End: 2020-08-13

## 2020-08-13 VITALS
HEART RATE: 60 BPM | SYSTOLIC BLOOD PRESSURE: 133 MMHG | RESPIRATION RATE: 19 BRPM | BODY MASS INDEX: 37.38 KG/M2 | WEIGHT: 198 LBS | DIASTOLIC BLOOD PRESSURE: 72 MMHG | HEIGHT: 61 IN | OXYGEN SATURATION: 95 % | TEMPERATURE: 97.6 F

## 2020-08-13 LAB
ALBUMIN SERPL-MCNC: 3.1 G/DL (ref 3.5–5.2)
ALBUMIN/GLOB SERPL: 1.1 G/DL
ALP SERPL-CCNC: 129 U/L (ref 39–117)
ALT SERPL W P-5'-P-CCNC: 28 U/L (ref 1–33)
ANION GAP SERPL CALCULATED.3IONS-SCNC: 9.6 MMOL/L (ref 5–15)
AST SERPL-CCNC: 21 U/L (ref 1–32)
BASOPHILS # BLD AUTO: 0.08 10*3/MM3 (ref 0–0.2)
BASOPHILS NFR BLD AUTO: 2.1 % (ref 0–1.5)
BH BB BLOOD EXPIRATION DATE: NORMAL
BH BB BLOOD EXPIRATION DATE: NORMAL
BH BB BLOOD TYPE BARCODE: 1700
BH BB BLOOD TYPE BARCODE: 7300
BH BB DISPENSE STATUS: NORMAL
BH BB DISPENSE STATUS: NORMAL
BH BB PRODUCT CODE: NORMAL
BH BB PRODUCT CODE: NORMAL
BH BB UNIT NUMBER: NORMAL
BH BB UNIT NUMBER: NORMAL
BILIRUB SERPL-MCNC: 0.6 MG/DL (ref 0–1.2)
BUN SERPL-MCNC: 24 MG/DL (ref 8–23)
BUN/CREAT SERPL: 12.8 (ref 7–25)
CALCIUM SPEC-SCNC: 8.2 MG/DL (ref 8.6–10.5)
CHLORIDE SERPL-SCNC: 104 MMOL/L (ref 98–107)
CO2 SERPL-SCNC: 25.4 MMOL/L (ref 22–29)
CREAT SERPL-MCNC: 1.88 MG/DL (ref 0.57–1)
CROSSMATCH INTERPRETATION: NORMAL
CROSSMATCH INTERPRETATION: NORMAL
DEPRECATED RDW RBC AUTO: 71.2 FL (ref 37–54)
EOSINOPHIL # BLD AUTO: 0.4 10*3/MM3 (ref 0–0.4)
EOSINOPHIL NFR BLD AUTO: 10.3 % (ref 0.3–6.2)
ERYTHROCYTE [DISTWIDTH] IN BLOOD BY AUTOMATED COUNT: 20.5 % (ref 12.3–15.4)
GFR SERPL CREATININE-BSD FRML MDRD: 26 ML/MIN/1.73
GLOBULIN UR ELPH-MCNC: 2.9 GM/DL
GLUCOSE BLDC GLUCOMTR-MCNC: 100 MG/DL (ref 70–130)
GLUCOSE BLDC GLUCOMTR-MCNC: 108 MG/DL (ref 70–130)
GLUCOSE SERPL-MCNC: 184 MG/DL (ref 65–99)
HCT VFR BLD AUTO: 26.3 % (ref 34–46.6)
HGB BLD-MCNC: 8.5 G/DL (ref 12–15.9)
IMM GRANULOCYTES # BLD AUTO: 0.07 10*3/MM3 (ref 0–0.05)
IMM GRANULOCYTES NFR BLD AUTO: 1.8 % (ref 0–0.5)
LYMPHOCYTES # BLD AUTO: 1.51 10*3/MM3 (ref 0.7–3.1)
LYMPHOCYTES NFR BLD AUTO: 38.7 % (ref 19.6–45.3)
MCH RBC QN AUTO: 31.1 PG (ref 26.6–33)
MCHC RBC AUTO-ENTMCNC: 32.3 G/DL (ref 31.5–35.7)
MCV RBC AUTO: 96.3 FL (ref 79–97)
MONOCYTES # BLD AUTO: 0.15 10*3/MM3 (ref 0.1–0.9)
MONOCYTES NFR BLD AUTO: 3.8 % (ref 5–12)
NEUTROPHILS NFR BLD AUTO: 1.69 10*3/MM3 (ref 1.7–7)
NEUTROPHILS NFR BLD AUTO: 43.3 % (ref 42.7–76)
NRBC BLD AUTO-RTO: 0 /100 WBC (ref 0–0.2)
PLATELET # BLD AUTO: 201 10*3/MM3 (ref 140–450)
PMV BLD AUTO: 12 FL (ref 6–12)
POTASSIUM SERPL-SCNC: 4.2 MMOL/L (ref 3.5–5.2)
PROT SERPL-MCNC: 6 G/DL (ref 6–8.5)
RBC # BLD AUTO: 2.73 10*6/MM3 (ref 3.77–5.28)
REF LAB TEST METHOD: NORMAL
SARS-COV-2 RNA RESP QL NAA+PROBE: NOT DETECTED
SODIUM SERPL-SCNC: 139 MMOL/L (ref 136–145)
TROPONIN T SERPL-MCNC: <0.01 NG/ML (ref 0–0.03)
UNIT  ABO: NORMAL
UNIT  ABO: NORMAL
UNIT  RH: NORMAL
UNIT  RH: NORMAL
WBC # BLD AUTO: 3.9 10*3/MM3 (ref 3.4–10.8)

## 2020-08-13 PROCEDURE — 80053 COMPREHEN METABOLIC PANEL: CPT | Performed by: HOSPITALIST

## 2020-08-13 PROCEDURE — 85025 COMPLETE CBC W/AUTO DIFF WBC: CPT | Performed by: HOSPITALIST

## 2020-08-13 PROCEDURE — 63710000001 DIPHENHYDRAMINE PER 50 MG: Performed by: NURSE PRACTITIONER

## 2020-08-13 PROCEDURE — 84484 ASSAY OF TROPONIN QUANT: CPT | Performed by: HOSPITALIST

## 2020-08-13 PROCEDURE — 99217 PR OBSERVATION CARE DISCHARGE MANAGEMENT: CPT | Performed by: HOSPITALIST

## 2020-08-13 PROCEDURE — 82962 GLUCOSE BLOOD TEST: CPT

## 2020-08-13 PROCEDURE — G0378 HOSPITAL OBSERVATION PER HR: HCPCS

## 2020-08-13 RX ORDER — ATROPA BELLADONNA AND OPIUM 16.2; 3 MG/1; MG/1
30 SUPPOSITORY RECTAL EVERY 8 HOURS PRN
Qty: 4 SUPPOSITORY | Refills: 0 | Status: SHIPPED | OUTPATIENT
Start: 2020-08-13 | End: 2020-12-02

## 2020-08-13 RX ORDER — NYSTATIN 100000 [USP'U]/G
POWDER TOPICAL EVERY 12 HOURS SCHEDULED
Status: DISCONTINUED | OUTPATIENT
Start: 2020-08-13 | End: 2020-08-13 | Stop reason: HOSPADM

## 2020-08-13 RX ADMIN — DESVENLAFAXINE SUCCINATE 50 MG: 50 TABLET, EXTENDED RELEASE ORAL at 09:04

## 2020-08-13 RX ADMIN — LEVOTHYROXINE SODIUM 88 MCG: 88 TABLET ORAL at 06:40

## 2020-08-13 RX ADMIN — NYSTATIN: 100000 POWDER TOPICAL at 11:44

## 2020-08-13 RX ADMIN — MIDODRINE HYDROCHLORIDE 5 MG: 5 TABLET ORAL at 09:04

## 2020-08-13 RX ADMIN — FUROSEMIDE 20 MG: 20 TABLET ORAL at 09:04

## 2020-08-13 RX ADMIN — MIDODRINE HYDROCHLORIDE 5 MG: 5 TABLET ORAL at 11:44

## 2020-08-13 RX ADMIN — ACETAMINOPHEN 650 MG: 325 TABLET, FILM COATED ORAL at 09:04

## 2020-08-13 RX ADMIN — APIXABAN 5 MG: 2.5 TABLET, FILM COATED ORAL at 09:04

## 2020-08-13 RX ADMIN — DIPHENHYDRAMINE HYDROCHLORIDE 25 MG: 25 CAPSULE ORAL at 06:40

## 2020-08-13 NOTE — PLAN OF CARE
"Discharge Planning Assessment   [place]     Patient Name: Joya Singh  MRN: [id number]  Today's Date: 8/13/2020    Admit Date: 8/12/2020    Discharge Needs Assessment       Row Name 08/13/20 1158       Living Environment    Lives With  child(isabel), adult    Name(s) of Who Lives With Patient  Adult son and his wife     Current Living Arrangements  home/apartment/condo    Primary Care Provided by  self    Provides Primary Care For  no one, unable/limited ability to care for self    Family Caregiver if Needed  child(isabel), adult    Family Caregiver Names  Isaac and Junior pt's sons     Quality of Family Relationships  supportive    Able to Return to Prior Arrangements  yes       Resource/Environmental Concerns    Resource/Environmental Concerns  none    Transportation Concerns  car, none       Transition Planning    Patient/Family Anticipates Transition to  home with family;home with help/services    Patient/Family Anticipated Services at Transition  none    Transportation Anticipated  family or friend will provide       Discharge Needs Assessment    Readmission Within the Last 30 Days  no previous admission in last 30 days    Concerns to be Addressed  no discharge needs identified    Equipment Currently Used at Home  wheelchair;wheelchair, motorized;power chair,(recliner lift);shower chair    Anticipated Changes Related to Illness  none    Equipment Needed After Discharge  none    Provided Post Acute Provider List?  N/A    N/A Provider List Comment  Pt states \"If I cannot use [facility] I don't want any Home health\"            Discharge Plan       Row Name 08/13/20 1201       Plan    Plan  D/C home with family.      Plan Comments  Visit with pt in her room to discuss d/c needs.  Facesheet verified.  Pt was here recently and nothing has changed as far as her demographics.  Pt has the same DME such as a motorized WC, Lift chair, shower chair, walker.  Pt denies any further needs for DME.  We discussed HH " availability and pt states she will not use any other agency besides [facility].  Explained that they do not have availability in her area but will call to ask about PT/OT.  Pt states she is having trouble financially paying for depends.  Information given r/t low cost medical supplies ordered online.  Also suggested calling the 1-800 number on her insurance card to check insurance coverage of depends.  CM has call placed to [facility], awaiting return phone call.              Destination        Coordination has not been started for this encounter.          Durable Medical Equipment        Coordination has not been started for this encounter.          Dialysis/Infusion        Coordination has not been started for this encounter.          Home Medical Care        Coordination has not been started for this encounter.          Therapy        Coordination has not been started for this encounter.          Community Resources        Coordination has not been started for this encounter.            Expected Discharge Date and Time       Expected Discharge Date Expected Discharge Time    Aug 13, 2020             Demographic Summary       Row Name 08/13/20 1157       General Information    Admission Type  observation    Arrived From  home    Referral Source  admission list    Reason for Consult  discharge planning    Preferred Language  English     Used During This Interaction  no      Row Name 08/13/20 1147       General Information    Admission Type  observation    Arrived From  home    Referral Source  admission list    Reason for Consult  discharge planning    Preferred Language  English     Used During This Interaction  no            Functional Status       Row Name 08/13/20 1147       Functional Status    Usual Activity Tolerance  moderate    Current Activity Tolerance  moderate       Functional Status, IADL    Medications  independent    Meal Preparation  assistive equipment and person    Housekeeping   assistive equipment and person    Laundry  assistive equipment and person    Shopping  assistive equipment and person       Mental Status    General Appearance WDL  WDL       Mental Status Summary    Recent Changes in Mental Status/Cognitive Functioning  no changes            Psychosocial    No documentation.         Abuse/Neglect    No documentation.         Legal    No documentation.         Substance Abuse    No documentation.         Patient Forms    No documentation.             Domenic Stratton RN

## 2020-08-13 NOTE — DISCHARGE SUMMARY
Joya Singh  1940  [id number]    Hospitalists Discharge Summary    Date of Admission: 8/12/2020  Date of Discharge:  8/13/2020    Primary Discharge Diagnoses:  1.  Syncopal Episode  2.  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  Bladder Spasms  2.  CKDIII  3.  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  Morbid Obesity Body mass index is 37.41 kg/m².    History of Present Illness (taken from H&P):  Ms. Singh is a pleasant 81 y/o  female who was presenting to the [facility] today to receive her Procrit shot when she had a syncopal episode while sitting in her wheelchair,  She was out only for seconds.  She notes the continue suprapubic discomfort I saw her for during her last stay.  She denies fever and chills.  She denies chest pain.  She denies nausea.  She is also currently complaining of a headache.  She was heme negative in the ER.    Hospital Course:  Ms. Singh was admitted to the Med/Surg unit on tele.  AMI was excluded by serial biomarker study.  No arrhythmias were noted on tele.  She was transfused as requested by [facility].  She still c/o bladder spasms as before with again good control with B&O suppository.  I found a Good Rx coupon for a small quantity so this was provided at discharge.      PCP  Patient Care Team:  Chari Mercado MD as PCP - General  Chari Mercado MD as PCP - Family Medicine  Christos Rob MD as Surgeon (General Surgery)  German Wylie MD as Surgeon (Orthopedic Surgery)  Yasmani Baugh MD as Consulting Physician (Nephrology)  Yasmani Baugh MD as Referring Physician (Nephrology)  Elieser Headley MD as Consulting Physician (Hematology and Oncology)    Consults:   Consults     Date and Time Order Name Status Description    8/12/2020 1212 Hematology & Oncology Inpatient Consult Completed     8/12/2020 1202 Inpatient Internal Medicine Consult Completed           Operations and Procedures Performed:       Ct Abdomen Pelvis Without  Contrast    Result Date: 7/29/2020  Narrative: ABDOMEN AND PELVIS CT WITHOUT CONTRAST 07/29/2020  HISTORY: 80-year-old female who fell one week ago and hit the back of her head. Confusion and disorientation. Abdominal pain since the fall. Equivocal history of prior abdominal surgery.  TECHNIQUE: Noncontrast CT of the abdomen and pelvis was performed. Comparisons 10/26/2018 Radiation dose reduction techniques included automated exposure control or exposure modulation based on body size. Radiation audit for CT and nuclear cardiology exams in the last 12 months: 0.  ABDOMEN FINDINGS: There is no pleural or pericardial effusion. The heart is borderline enlarged. Normal caliber aorta and atherosclerotic change. Borderline splenic size and the adrenal glands are negative. Atrophic pancreas and surgical absence of the gallbladder. The liver is unremarkable. The stomach is not well distended or evaluated. No adenopathy. The kidneys are atrophic and nonobstructed. No radiopaque stone on either side. No adenopathy.  PELVIS FINDINGS: The bladder is unremarkable. No drainable fluid collection. Moderate stool burden and no bowel obstruction. Appendix not clearly demonstrated but no secondary sign of appendicitis. Uterus surgically absent. Negative inguinal canals. The bones are osteoporotic. Status post lower lumbar fusion. No compression fracture or malalignment.      Impression: 1. No clearly acute process in the abdomen or pelvis. 2. Surgical absence of the gallbladder and uterus. 3. Appendix not identified but no secondary sign of appendicitis. 4. Status post lower lumbar fusion. No new compression fracture or malalignment.  This report was finalized on 7/29/2020 12:05 PM by Dr. Navarro Smalls MD.      Ct Head Without Contrast    Result Date: 7/29/2020  Narrative: CT HEAD WITHOUT CONTRAST 07/29/2020  HISTORY: 80-year-old female who fell one week ago and hit the back of her head. Headaches ever since. Abdominal pain since the  fall. Confusion and disorientation.  TECHNIQUE:  Noncontrast CT brain was performed. COMPARISON study 10/16/2018 Radiation dose reduction techniques included automated exposure control or exposure modulation based on body size. Radiation audit for CT and nuclear cardiology exams in the last 12 months: 0.  FINDINGS:  Mild generalized cerebral atrophy. Sulci and ventricles are otherwise unremarkable. No midline shift. No evidence of acute intracranial hemorrhage. There is no mass, mass effect or edema to suggest acute infarct and no extra-axial fluid collection is identified. Nonspecific but probable chronic ischemic changes in the periventricular and deep white matter. Globes are intact and bones are intact. Sinuses are clear.       Impression: 1. No clearly acute intracranial process. No evidence of acute intracranial hemorrhage. 2. Atrophy and probable chronic ischemic change.  This report was finalized on 7/29/2020 11:59 AM by Dr. Navarro Smalls MD.      Xr Chest 1 View    Result Date: 7/29/2020  Narrative: XR CHEST 1 VW-: 7/29/2020 10:56 AM  INDICATION: 80-year-old female with confusion. Does not feel well. Sick to her stomach.  COMPARISON:  08/05/2019.  FINDINGS: Single Portable AP view(s) of the chest. Status post anterior cervical fusion. Cardiac silhouette is borderline enlarged. The vascularity is unremarkable. There is some probable perihilar and lower lung zone atelectasis on the right. No effusion dense consolidation or pneumothorax. Advanced degenerative change of the left shoulder and old healed granulomatous disease.      Impression:  1. Borderline cardiac size with probable perihilar atelectasis on the right. Otherwise negative chest.  This report was finalized on 7/29/2020 11:21 AM by Dr. Navarro Smalls MD.        Allergies:  is allergic to baclofen; codeine; lisinopril; morphine; and penicillins.    Chip  reviewed    Discharge Medications:     Discharge Medications      New Medications       "Instructions Start Date   opium-belladonna 16.2-30 MG suppository  Commonly known as:  B&O SUPPRETTES   30 mg, Rectal, Every 8 Hours PRN         Continue These Medications      Instructions Start Date   Accu-Chek FastClix Lancets misc   TEST 3-4 TIMES DAILY AS DIRECTED      Accu-Chek Susu SmartView w/Device kit   TEST blood sugar three times daily or as directed      Accu-Chek SmartView test strip  Generic drug:  glucose blood   TEST BLOOD SUGAR THREE TIMES DAILY OR AS DIRECTED      acetaminophen 325 MG tablet  Commonly known as:  TYLENOL   650 mg, Oral, Every 6 Hours PRN, OTC product      albuterol sulfate  (90 Base) MCG/ACT inhaler  Commonly known as:  PROVENTIL HFA;VENTOLIN HFA;PROAIR HFA   2 puffs, Inhalation, Every 4 Hours PRN      atorvastatin 10 MG tablet  Commonly known as:  LIPITOR   TAKE ONE TABLET BY MOUTH AT BEDTIME      desvenlafaxine 50 MG 24 hr tablet  Commonly known as:  PRISTIQ   TAKE ONE TABLET BY MOUTH EVERY DAY      diphenhydrAMINE 25 mg capsule  Commonly known as:  BENADRYL   25 mg, Oral, Every 6 Hours PRN      Eliquis 5 MG tablet tablet  Generic drug:  apixaban   TAKE ONE TABLET BY MOUTH TWICE DAILY      FreeStyle Gillian 14 Day Mayking device   1 each, Does not apply, 6 Times Daily      FreeStyle Gillian 14 Day Sensor misc   1 each, Does not apply, 6 Times Daily      furosemide 20 MG tablet  Commonly known as:  LASIX   20 mg, Oral, Daily      gabapentin 400 MG capsule  Commonly known as:  NEURONTIN   TAKE ONE CAPSULE BY MOUTH EVERY MORNING, AT NOON, AND TWO AT BEDTIME      insulin aspart 100 UNIT/ML injection  Commonly known as:  novoLOG   5 Units, Subcutaneous, 3 Times Daily With Meals      levothyroxine 88 MCG tablet  Commonly known as:  SYNTHROID, LEVOTHROID   TAKE ONE TABLET BY MOUTH EVERY DAY      midodrine 5 MG tablet  Commonly known as:  PROAMATINE   5 mg, Oral, 3 Times Daily Before Meals      Needle (Disp) 30G X 1/2\" misc  Commonly known as:  BD Disp Needles   To be used 3 times " daily with Novolog Flexpen.      nystatin 287987 UNIT/GM powder  Commonly known as:  MYCOSTATIN   Topical, 2 Times Daily      O2  Commonly known as:  OXYGEN   2 L/min, Inhalation, Nightly, Uses 2L  overnight only      omeprazole 40 MG capsule  Commonly known as:  priLOSEC   TAKE ONE CAPSULE BY MOUTH EVERY DAY      potassium chloride 10 MEQ CR tablet  Commonly known as:  K-DUR   10 mEq, Oral, Daily      Tresiba FlexTouch 200 UNIT/ML solution pen-injector pen injection  Generic drug:  Insulin Degludec   INJECT 54 UNITS subcutaneously AT BEDTIME      UltiCare Mini Pen Needles 31G X 6 MM misc  Generic drug:  Insulin Pen Needle   USE TO INJECT INSULINS 4 TIMES DAILY AS DIRECTED             Last Lab Results:   Lab Results (most recent)     Procedure Component Value Units Date/Time    Comprehensive Metabolic Panel [[id number]]  (Abnormal) Collected:  08/13/20 0349    Specimen:  Blood Updated:  08/13/20 0837     Glucose 184 mg/dL      BUN 24 mg/dL      Creatinine 1.88 mg/dL      Sodium 139 mmol/L      Potassium 4.2 mmol/L      Chloride 104 mmol/L      CO2 25.4 mmol/L      Calcium 8.2 mg/dL      Total Protein 6.0 g/dL      Albumin 3.10 g/dL      ALT (SGPT) 28 U/L      AST (SGOT) 21 U/L      Alkaline Phosphatase 129 U/L      Total Bilirubin 0.6 mg/dL      eGFR Non African Amer 26 mL/min/1.73      Globulin 2.9 gm/dL      A/G Ratio 1.1 g/dL      BUN/Creatinine Ratio 12.8     Anion Gap 9.6 mmol/L     Narrative:       GFR Normal >60  Chronic Kidney Disease <60  Kidney Failure <15      POC Glucose Once [[id number]]  (Normal) Collected:  08/13/20 0804    Specimen:  Blood Updated:  08/13/20 0810     Glucose 100 mg/dL     Troponin [[id number]]  (Normal) Collected:  08/13/20 0349    Specimen:  Blood Updated:  08/13/20 0503     Troponin T <0.010 ng/mL     Narrative:       Troponin T Reference Range:  <= 0.03 ng/mL-   Negative for AMI  >0.03 ng/mL-     Abnormal for myocardial necrosis.  Clinicians would have to utilize clinical acumen,  EKG, Troponin and serial changes to determine if it is an Acute Myocardial Infarction or myocardial injury due to an underlying chronic condition.       Results may be falsely decreased if patient taking Biotin.      CBC & Differential [[id number]] Collected:  08/13/20 0349    Specimen:  Blood Updated:  08/13/20 0425    Narrative:       The following orders were created for panel order CBC & Differential.  Procedure                               Abnormality         Status                     ---------                               -----------         ------                     CBC Auto Differential[[id number]]        Abnormal            Final result                 Please view results for these tests on the individual orders.    CBC Auto Differential [[id number]]  (Abnormal) Collected:  08/13/20 0349    Specimen:  Blood Updated:  08/13/20 0425     WBC 3.90 10*3/mm3      RBC 2.73 10*6/mm3      Hemoglobin 8.5 g/dL      Hematocrit 26.3 %      MCV 96.3 fL      MCH 31.1 pg      MCHC 32.3 g/dL      RDW 20.5 %      RDW-SD 71.2 fl      MPV 12.0 fL      Platelets 201 10*3/mm3      Neutrophil % 43.3 %      Lymphocyte % 38.7 %      Monocyte % 3.8 %      Eosinophil % 10.3 %      Basophil % 2.1 %      Immature Grans % 1.8 %      Neutrophils, Absolute 1.69 10*3/mm3      Lymphocytes, Absolute 1.51 10*3/mm3      Monocytes, Absolute 0.15 10*3/mm3      Eosinophils, Absolute 0.40 10*3/mm3      Basophils, Absolute 0.08 10*3/mm3      Immature Grans, Absolute 0.07 10*3/mm3      nRBC 0.0 /100 WBC     POC Glucose Once [[id number]]  (Normal) Collected:  08/12/20 2227    Specimen:  Blood Updated:  08/12/20 2233     Glucose 110 mg/dL     COVID PRE-OP / PRE-PROCEDURE SCREENING ORDER (NO ISOLATION) - Swab, Nasopharynx [[id number]] Collected:  08/12/20 1302    Specimen:  Swab from Nasopharynx Updated:  08/12/20 1305    Narrative:       The following orders were created for panel order COVID PRE-OP / PRE-PROCEDURE SCREENING ORDER (NO ISOLATION) -  Swab, Nasopharynx.  Procedure                               Abnormality         Status                     ---------                               -----------         ------                     COVID-19,[facility], NP ...[[id number]]                      In process                   Please view results for these tests on the individual orders.    COVID-19,[facility], NP SWAB IN [facility] VIRAL TRANSPORT MEDIA 24-30 HR TAT - Swab, Nasopharynx [[id number]] Collected:  08/12/20 1302    Specimen:  Swab from Nasopharynx Updated:  08/12/20 1305    Urinalysis With Microscopic If Indicated (No Culture) - Urine, Clean Catch [[id number]]  (Normal) Collected:  08/12/20 1132    Specimen:  Urine, Clean Catch Updated:  08/12/20 1141     Color, UA Yellow     Appearance, UA Clear     pH, UA <=5.0     Specific Gravity, UA 1.010     Glucose, UA Negative     Ketones, UA Negative     Bilirubin, UA Negative     Blood, UA Negative     Protein, UA Negative     Leuk Esterase, UA Negative     Nitrite, UA Negative     Urobilinogen, UA 0.2 E.U./dL    Narrative:       Urine microscopic not indicated.    Troponin [[id number]]  (Normal) Collected:  08/12/20 1107    Specimen:  Blood Updated:  08/12/20 1139     Troponin T <0.010 ng/mL     Narrative:       Troponin T Reference Range:  <= 0.03 ng/mL-   Negative for AMI  >0.03 ng/mL-     Abnormal for myocardial necrosis.  Clinicians would have to utilize clinical acumen, EKG, Troponin and serial changes to determine if it is an Acute Myocardial Infarction or myocardial injury due to an underlying chronic condition.       Results may be falsely decreased if patient taking Biotin.      Comprehensive Metabolic Panel [[id number]]  (Abnormal) Collected:  08/12/20 1107    Specimen:  Blood Updated:  08/12/20 1136     Glucose 113 mg/dL      BUN 26 mg/dL      Creatinine 1.84 mg/dL      Sodium 139 mmol/L      Potassium 4.4 mmol/L      Chloride 103 mmol/L      CO2 26.4 mmol/L      Calcium 8.8 mg/dL      Total Protein  7.0 g/dL      Albumin 3.80 g/dL      ALT (SGPT) 35 U/L      AST (SGOT) 28 U/L      Alkaline Phosphatase 153 U/L      Total Bilirubin 0.5 mg/dL      eGFR Non African Amer 27 mL/min/1.73      Globulin 3.2 gm/dL      A/G Ratio 1.2 g/dL      BUN/Creatinine Ratio 14.1     Anion Gap 9.6 mmol/L     Narrative:       GFR Normal >60  Chronic Kidney Disease <60  Kidney Failure <15      D-dimer, Quantitative [[id number]]  (Abnormal) Collected:  08/12/20 1108    Specimen:  Blood Updated:  08/12/20 1132     D-Dimer, Quantitative 0.65 MCGFEU/mL     Narrative:       Can be elevated in, but is not diagnostic for deep vein thrombosis (DVT) or pulmonary embolis (PE).  It is also elevated in other medical conditions.  Clinical correlation is required.  The negative cut-off value for the D-Dimer is 0.50 mcg FEU/mL for DVT and PE.      CBC & Differential [[id number]] Collected:  08/12/20 1107    Specimen:  Blood Updated:  08/12/20 1129    Narrative:       The following orders were created for panel order CBC & Differential.  Procedure                               Abnormality         Status                     ---------                               -----------         ------                     CBC Auto Differential[[id number]]        Abnormal            Final result                 Please view results for these tests on the individual orders.    CBC Auto Differential [[id number]]  (Abnormal) Collected:  08/12/20 1107    Specimen:  Blood Updated:  08/12/20 1129     WBC 4.48 10*3/mm3      RBC 2.20 10*6/mm3      Hemoglobin 7.1 g/dL      Hematocrit 22.4 %      .8 fL      MCH 32.3 pg      MCHC 31.7 g/dL      RDW 21.8 %      RDW-SD 79.9 fl      MPV 12.0 fL      Platelets 263 10*3/mm3      Neutrophil % 46.6 %      Lymphocyte % 35.7 %      Monocyte % 4.5 %      Eosinophil % 11.2 %      Basophil % 1.3 %      Immature Grans % 0.7 %      Neutrophils, Absolute 2.09 10*3/mm3      Lymphocytes, Absolute 1.60 10*3/mm3      Monocytes, Absolute 0.20  10*3/mm3      Eosinophils, Absolute 0.50 10*3/mm3      Basophils, Absolute 0.06 10*3/mm3      Immature Grans, Absolute 0.03 10*3/mm3      nRBC 0.0 /100 WBC         Imaging Results (Most Recent)     None          PROCEDURES      Condition on Discharge:  Stable    Physical Exam at Discharge  Vital Signs  Temp:  [96.7 °F (35.9 °C)-98.4 °F (36.9 °C)] 96.7 °F (35.9 °C)  Heart Rate:  [69-79] 78  Resp:  [16-18] 18  BP: (113-153)/(67-84) 113/67    Physical Exam:  Physical Exam   Constitutional: No acute distress   Cardiovascular: Regular rate, regular rhythm, S1 normal and S2 normal.  Exam reveals no gallop and no friction rub.  No murmur heard.  Pulmonary/Chest: Lungs are diminished to auscultation bilaterally. No respiratory distress. No wheezes. No rhonchi. No rales.   Abdominal: Obese. Soft. Bowel sounds are normal.  There is no tenderness.   Musculoskeletal: Normal Muscle tone  Extremities: No edema. No asymmetry of the BLE.  Neurological: Cranial nerves II-XII are grossly intact with no focal deficits.    Discharge Disposition  Home    Visiting Nurse:    No     Home PT/OT:  No     Home Safety Evaluation:  No     DME  None    Discharge Diet:      Dietary Orders (From admission, onward)     Start     Ordered    08/12/20 1536  Diet Regular; Cardiac, Consistent Carbohydrate  Diet Effective Now     Question Answer Comment   Diet Texture / Consistency Regular    Common Modifiers Cardiac    Common Modifiers Consistent Carbohydrate        08/12/20 1535                Activity at Discharge:  As tolerated    Follow-up Appointments  Future Appointments   Date Time Provider Department Center   8/19/2020  9:30 AM LAB CHAIR 1 [facility]   8/19/2020 10:00 AM INJ/PORT CHAIR 4 [facility]   8/26/2020  9:30 AM LAB CHAIR 1 [facility]   8/26/2020 10:00 AM INJ/PORT CHAIR 4 [facility]   9/2/2020  9:30 AM LAB CHAIR 1 [facility]   9/2/2020 10:00 AM INJ/PORT CHAIR 4  [facility]   9/9/2020  9:30 AM LAB CHAIR 1 [facility]   9/9/2020 10:00 AM INJ/PORT CHAIR 4 [facility]   9/9/2020  2:00 PM Chari Mercado MD [facility] None   9/16/2020  9:30 AM LAB CHAIR 1 [facility]   9/16/2020 10:00 AM Elieser Headley MD [facility]   9/16/2020 10:20 AM INJ/PORT CHAIR 4 [facility]   10/14/2020  2:00 PM Chari Mercado MD [facility] None         Test Results Pending at Discharge   Order Current Status    COVID PRE-OP / PRE-PROCEDURE SCREENING ORDER (NO ISOLATION) - Swab, Nasopharynx In process    COVID-19,[facility], NP SWAB IN [facility] VIRAL TRANSPORT MEDIA 24-30 HR TAT - Swab, Nasopharynx In process           Elton Guzman MD  08/13/20  11:32

## 2020-08-13 NOTE — OUTREACH NOTE
Prep Survey      Responses   [facility] patient discharged from?  [place]   Is LACE score < 7 ?  No   Eligibility  [facility] [place]   Date of Admission  08/12/20   Date of Discharge  08/13/20   Discharge Disposition  Home or Self Care   Discharge diagnosis  syncope   COVID-19 Test Status  Negative   Does the patient have one of the following disease processes/diagnoses(primary or secondary)?  Other   Does the patient have Home health ordered?  No   Is there a DME ordered?  No   Prep survey completed?  Yes          Ivanna Sanchez RN

## 2020-08-13 NOTE — PLAN OF CARE
Problem: Patient Care Overview  Goal: Plan of Care Review  Outcome: Ongoing (interventions implemented as appropriate)  Flowsheets (Taken 8/13/2020 0519)  Progress: improving  Plan of Care Reviewed With: patient  Outcome Summary: Patient tolerated 2units of PRBC. Sinus rhythm on telemetry. Alert and oriented. VSS. Rested well throughout night.  Goal: Individualization and Mutuality  Outcome: Ongoing (interventions implemented as appropriate)  Goal: Discharge Needs Assessment  Outcome: Ongoing (interventions implemented as appropriate)  Goal: Interprofessional Rounds/Family Conf  Outcome: Ongoing (interventions implemented as appropriate)     Problem: Fall Risk (Adult)  Goal: Identify Related Risk Factors and Signs and Symptoms  Outcome: Ongoing (interventions implemented as appropriate)  Flowsheets (Taken 8/13/2020 0519)  Related Risk Factors (Fall Risk): age-related changes; gait/mobility problems; fatigue/slow reaction  Goal: Absence of Fall  Outcome: Ongoing (interventions implemented as appropriate)  Flowsheets (Taken 8/13/2020 0519)  Absence of Fall: making progress toward outcome     Problem: Skin Injury Risk (Adult)  Goal: Identify Related Risk Factors and Signs and Symptoms  Outcome: Ongoing (interventions implemented as appropriate)  Flowsheets (Taken 8/13/2020 0519)  Related Risk Factors (Skin Injury Risk): advanced age; body weight extremes; edema  Goal: Skin Health and Integrity  Outcome: Ongoing (interventions implemented as appropriate)  Flowsheets (Taken 8/13/2020 0519)  Skin Health and Integrity: making progress toward outcome

## 2020-08-13 NOTE — NURSING NOTE
Continued Stay Note  VICKY Sharma     Patient Name: Joya Singh  MRN: [id number]  Today's Date: 8/13/2020    Admit Date: 8/12/2020    Discharge Plan     Row Name 08/13/20 1302       Plan    Plan  d/c home with family    Plan Comments  F/u phone call from Luann at [facility].  Confirmed that they cannot service [facility] for nursing or PT/OT.  Pt informed of lack of coverage and pt reiterated that she refuses HH if she cannot use [facility].  No other needs noted at this time.  Pt's daughter in law is on the way to  pt.      Row Name 08/13/20 1201       Plan    Plan  D/C home with family.      Plan Comments  Visit with pt in her room to discuss d/c needs.  Facesheet verified.  Pt was here recently and nothing has changed as far as her demographics.  Pt has the same DME such as a motorized WC, Lift chair, shower chair, walker.  Pt denies any further needs for DME.  We discussed HH availability and pt states she will not use any other agency besides [facility].  Explained that they do not have availability in her area but will call to ask about PT/OT.  Pt states she is having trouble financially paying for depends.  Information given r/t low cost medical supplies ordered online.  Also suggested calling the [phone] number on her insurance card to check insurance coverage of depends.  CM has call placed to [facility], awaiting return phone call.          Discharge Codes    No documentation.       Expected Discharge Date and Time     Expected Discharge Date Expected Discharge Time    Aug 13, 2020             Domenic Stratton RN

## 2020-08-13 NOTE — NURSING NOTE
"Discharge Planning Assessment   [facility]     Patient Name: Joya Singh  MRN: [id number]  Today's Date: 8/13/2020    Admit Date: 8/12/2020    Discharge Needs Assessment     Row Name 08/13/20 1158       Living Environment    Lives With  child(isabel), adult    Name(s) of Who Lives With Patient  Adult son and his wife     Current Living Arrangements  home/apartment/condo    Primary Care Provided by  self    Provides Primary Care For  no one, unable/limited ability to care for self    Family Caregiver if Needed  child(isabel), adult    Family Caregiver Names  Isaac and Junior pt's sons     Quality of Family Relationships  supportive    Able to Return to Prior Arrangements  yes       Resource/Environmental Concerns    Resource/Environmental Concerns  none    Transportation Concerns  car, none       Transition Planning    Patient/Family Anticipates Transition to  home with family;home with help/services    Patient/Family Anticipated Services at Transition  none    Transportation Anticipated  family or friend will provide       Discharge Needs Assessment    Readmission Within the Last 30 Days  no previous admission in last 30 days    Concerns to be Addressed  no discharge needs identified    Equipment Currently Used at Home  wheelchair;wheelchair, motorized;power chair,(recliner lift);shower chair    Anticipated Changes Related to Illness  none    Equipment Needed After Discharge  none    Provided Post Acute Provider List?  N/A    N/A Provider List Comment  Pt states \"If I cannot use [facility] I don't want any Home health\"        Discharge Plan     Row Name 08/13/20 1201       Plan    Plan  D/C home with family.      Plan Comments  Visit with pt in her room to discuss d/c needs.  Facesheet verified.  Pt was here recently and nothing has changed as far as her demographics.  Pt has the same DME such as a motorized WC, Lift chair, shower chair, walker.  Pt denies any further needs for DME.  We discussed HH availability " and pt states she will not use any other agency besides [facility] [place].  Explained that they do not have availability in her area but will call to ask about PT/OT.  Pt states she is having trouble financially paying for depends.  Information given r/t low cost medical supplies ordered online.  Also suggested calling the 1-800 number on her insurance card to check insurance coverage of depends.  CM has call placed to [facility], awaiting return phone call.          Destination      Coordination has not been started for this encounter.      Durable Medical Equipment      Coordination has not been started for this encounter.      Dialysis/Infusion      Coordination has not been started for this encounter.      Home Medical Care      Coordination has not been started for this encounter.      Therapy      Coordination has not been started for this encounter.      Community Resources      Coordination has not been started for this encounter.        Expected Discharge Date and Time     Expected Discharge Date Expected Discharge Time    Aug 13, 2020         Demographic Summary     Row Name 08/13/20 1157       General Information    Admission Type  observation    Arrived From  home    Referral Source  admission list    Reason for Consult  discharge planning    Preferred Language  English     Used During This Interaction  no    Row Name 08/13/20 1147       General Information    Admission Type  observation    Arrived From  home    Referral Source  admission list    Reason for Consult  discharge planning    Preferred Language  English     Used During This Interaction  no        Functional Status     Row Name 08/13/20 1147       Functional Status    Usual Activity Tolerance  moderate    Current Activity Tolerance  moderate       Functional Status, IADL    Medications  independent    Meal Preparation  assistive equipment and person    Housekeeping  assistive equipment and person    Laundry  assistive equipment and  person    Shopping  assistive equipment and person       Mental Status    General Appearance WDL  WDL       Mental Status Summary    Recent Changes in Mental Status/Cognitive Functioning  no changes        Psychosocial    No documentation.       Abuse/Neglect    No documentation.       Legal    No documentation.       Substance Abuse    No documentation.       Patient Forms    No documentation.           Domenic Stratton RN

## 2020-08-14 ENCOUNTER — TELEPHONE (OUTPATIENT)
Dept: INTERNAL MEDICINE | Facility: CLINIC | Age: 80
End: 2020-08-14

## 2020-08-14 ENCOUNTER — TRANSITIONAL CARE MANAGEMENT TELEPHONE ENCOUNTER (OUTPATIENT)
Dept: CALL CENTER | Facility: HOSPITAL | Age: 80
End: 2020-08-14

## 2020-08-14 NOTE — OUTREACH NOTE
Call Center TCM Note      Responses   [facility] patient discharged from?  [facility]   COVID-19 Test Status  Negative   Does the patient have one of the following disease processes/diagnoses(primary or secondary)?  Other   TCM attempt successful?  Yes   Call start time  1004   Call end time  1008   Discharge diagnosis  syncope   Meds reviewed with patient/caregiver?  Yes   Is the patient having any side effects they believe may be caused by any medication additions or changes?  No   Does the patient have all medications ordered at discharge?  Yes   Is the patient taking all medications as directed (includes completed medication regime)?  Yes   Does the patient have a primary care provider?   Yes   Does the patient have an appointment with their PCP within 7 days of discharge?  Greater than 7 days   Comments regarding PCP  f/u with Dr. Mercado on 9/9   Nursing Interventions  Verified appointment date/time/provider   Has the patient kept scheduled appointments due by today?  N/A   Psychosocial issues?  No   Did the patient receive a copy of their discharge instructions?  Yes   Nursing interventions  Reviewed instructions with patient   What is the patient's perception of their health status since discharge?  Improving   Is the patient/caregiver able to teach back signs and symptoms related to disease process for when to call PCP?  Yes   Is the patient/caregiver able to teach back signs and symptoms related to disease process for when to call 911?  Yes   Is the patient/caregiver able to teach back the hierarchy of who to call/visit for symptoms/problems? PCP, Specialist, Home health nurse, Urgent Care, ED, 911  Yes   TCM call completed?  Yes   Wrap up additional comments  Patient says she is doing some better but is still having some dizziness, she is going to call Dr. Headley to discuss this issue.          Suzette Verde RN    8/14/2020, 10:08

## 2020-08-14 NOTE — NURSING NOTE
"Case Management Discharge Note      Final Note: D/C home    Provided Post Acute Provider List?: N/A  N/A Provider List Comment: Pt states \"If I cannot use [facility] I don't want any Home health\"    Destination      No service has been selected for the patient.      Durable Medical Equipment      No service has been selected for the patient.      Dialysis/Infusion      No service has been selected for the patient.      Home Medical Care      No service has been selected for the patient.      Therapy      No service has been selected for the patient.      Community Resources      No service has been selected for the patient.             Final Discharge Disposition Code: 01 - home or self-care  "

## 2020-08-14 NOTE — TELEPHONE ENCOUNTER
Home Health representative with [facility] phones 8-13-20.  Patient had been d/c from agency prior to most recent referral.

## 2020-08-17 RX ORDER — MIDODRINE HYDROCHLORIDE 5 MG/1
5 TABLET ORAL
Qty: 90 TABLET | Refills: 0 | Status: SHIPPED | OUTPATIENT
Start: 2020-08-17 | End: 2020-08-19

## 2020-08-19 ENCOUNTER — TELEPHONE (OUTPATIENT)
Dept: INTERNAL MEDICINE | Facility: CLINIC | Age: 80
End: 2020-08-19

## 2020-08-19 ENCOUNTER — INFUSION (OUTPATIENT)
Dept: ONCOLOGY | Facility: HOSPITAL | Age: 80
End: 2020-08-19

## 2020-08-19 ENCOUNTER — LAB (OUTPATIENT)
Dept: LAB | Facility: HOSPITAL | Age: 80
End: 2020-08-19

## 2020-08-19 VITALS — TEMPERATURE: 98 F

## 2020-08-19 DIAGNOSIS — D64.9 ANEMIA REQUIRING TRANSFUSIONS: Primary | ICD-10-CM

## 2020-08-19 DIAGNOSIS — N18.30 CKD STAGE 3 DUE TO TYPE 2 DIABETES MELLITUS (HCC): ICD-10-CM

## 2020-08-19 DIAGNOSIS — D64.9 ANEMIA REQUIRING TRANSFUSIONS: ICD-10-CM

## 2020-08-19 DIAGNOSIS — D46.C MYELODYSPLASTIC SYNDROME WITH 5Q DELETION (HCC): ICD-10-CM

## 2020-08-19 DIAGNOSIS — E11.22 CKD STAGE 3 DUE TO TYPE 2 DIABETES MELLITUS (HCC): ICD-10-CM

## 2020-08-19 LAB
BASOPHILS # BLD AUTO: 0.11 10*3/MM3 (ref 0–0.2)
BASOPHILS NFR BLD AUTO: 2 % (ref 0–1.5)
DEPRECATED RDW RBC AUTO: 68.2 FL (ref 37–54)
EOSINOPHIL # BLD AUTO: 0.4 10*3/MM3 (ref 0–0.4)
EOSINOPHIL NFR BLD AUTO: 7.4 % (ref 0.3–6.2)
ERYTHROCYTE [DISTWIDTH] IN BLOOD BY AUTOMATED COUNT: 19.4 % (ref 12.3–15.4)
HCT VFR BLD AUTO: 28.4 % (ref 34–46.6)
HGB BLD-MCNC: 9.1 G/DL (ref 12–15.9)
IMM GRANULOCYTES # BLD AUTO: 0.09 10*3/MM3 (ref 0–0.05)
IMM GRANULOCYTES NFR BLD AUTO: 1.7 % (ref 0–0.5)
LYMPHOCYTES # BLD AUTO: 2.59 10*3/MM3 (ref 0.7–3.1)
LYMPHOCYTES NFR BLD AUTO: 47.8 % (ref 19.6–45.3)
MCH RBC QN AUTO: 30.5 PG (ref 26.6–33)
MCHC RBC AUTO-ENTMCNC: 32 G/DL (ref 31.5–35.7)
MCV RBC AUTO: 95.3 FL (ref 79–97)
MONOCYTES # BLD AUTO: 0.2 10*3/MM3 (ref 0.1–0.9)
MONOCYTES NFR BLD AUTO: 3.7 % (ref 5–12)
NEUTROPHILS NFR BLD AUTO: 2.03 10*3/MM3 (ref 1.7–7)
NEUTROPHILS NFR BLD AUTO: 37.4 % (ref 42.7–76)
NRBC BLD AUTO-RTO: 0 /100 WBC (ref 0–0.2)
PLATELET # BLD AUTO: 265 10*3/MM3 (ref 140–450)
PMV BLD AUTO: 11.9 FL (ref 6–12)
RBC # BLD AUTO: 2.98 10*6/MM3 (ref 3.77–5.28)
WBC # BLD AUTO: 5.42 10*3/MM3 (ref 3.4–10.8)

## 2020-08-19 PROCEDURE — 85025 COMPLETE CBC W/AUTO DIFF WBC: CPT | Performed by: INTERNAL MEDICINE

## 2020-08-19 PROCEDURE — 36415 COLL VENOUS BLD VENIPUNCTURE: CPT

## 2020-08-19 PROCEDURE — 96372 THER/PROPH/DIAG INJ SC/IM: CPT

## 2020-08-19 PROCEDURE — 25010000002 EPOETIN ALFA PER 1000 UNITS: Performed by: INTERNAL MEDICINE

## 2020-08-19 RX ORDER — MIDODRINE HYDROCHLORIDE 5 MG/1
TABLET ORAL
Qty: 90 TABLET | Refills: 0 | Status: SHIPPED | OUTPATIENT
Start: 2020-08-19 | End: 2020-09-08

## 2020-08-19 RX ADMIN — ERYTHROPOIETIN 60000 UNITS: 40000 INJECTION, SOLUTION INTRAVENOUS; SUBCUTANEOUS at 11:01

## 2020-08-19 NOTE — TELEPHONE ENCOUNTER
Patient called and stated that she believes she has another UTI. Patient stated she is having burning when urinating and pressure in abdomin. Patient would like medication be sent to    [facility] [address] [facility] [phone] [facility] [phone] FX     Please advise     [phone]

## 2020-08-20 ENCOUNTER — TELEPHONE (OUTPATIENT)
Dept: INTERNAL MEDICINE | Facility: CLINIC | Age: 80
End: 2020-08-20

## 2020-08-20 NOTE — TELEPHONE ENCOUNTER
PT CALLED BACK TO FOLLOW UP ON HER REQUEST FOR A PRESCRIPTION FOR A UTI. SHE STATES SHE IS MISERABLE AND IS ANXIOUS TO HEAR BACK.    CALLBACK [phone]

## 2020-08-20 NOTE — TELEPHONE ENCOUNTER
Patient calls asking for update regarding her UTI symptoms she phoned about previously this week.    Please contact patient.

## 2020-08-21 ENCOUNTER — LAB REQUISITION (OUTPATIENT)
Dept: LAB | Facility: HOSPITAL | Age: 80
End: 2020-08-21

## 2020-08-21 ENCOUNTER — READMISSION MANAGEMENT (OUTPATIENT)
Dept: CALL CENTER | Facility: HOSPITAL | Age: 80
End: 2020-08-21

## 2020-08-21 DIAGNOSIS — N39.0 URINARY TRACT INFECTION, SITE NOT SPECIFIED: ICD-10-CM

## 2020-08-21 LAB
BACTERIA UR QL AUTO: ABNORMAL /HPF
BILIRUB UR QL STRIP: NEGATIVE
CLARITY UR: ABNORMAL
COLOR UR: YELLOW
GLUCOSE UR STRIP-MCNC: NEGATIVE MG/DL
HGB UR QL STRIP.AUTO: ABNORMAL
HYALINE CASTS UR QL AUTO: ABNORMAL /LPF
KETONES UR QL STRIP: NEGATIVE
LEUKOCYTE ESTERASE UR QL STRIP.AUTO: ABNORMAL
NITRITE UR QL STRIP: POSITIVE
PH UR STRIP.AUTO: 6 [PH] (ref 4.5–8)
PROT UR QL STRIP: ABNORMAL
RBC # UR: ABNORMAL /HPF
REF LAB TEST METHOD: ABNORMAL
SP GR UR STRIP: 1.02 (ref 1–1.03)
SQUAMOUS #/AREA URNS HPF: ABNORMAL /HPF
UROBILINOGEN UR QL STRIP: ABNORMAL
WBC UR QL AUTO: ABNORMAL /HPF

## 2020-08-21 PROCEDURE — 87077 CULTURE AEROBIC IDENTIFY: CPT | Performed by: INTERNAL MEDICINE

## 2020-08-21 PROCEDURE — 81001 URINALYSIS AUTO W/SCOPE: CPT | Performed by: INTERNAL MEDICINE

## 2020-08-21 PROCEDURE — 87186 SC STD MICRODIL/AGAR DIL: CPT | Performed by: INTERNAL MEDICINE

## 2020-08-21 PROCEDURE — 87086 URINE CULTURE/COLONY COUNT: CPT | Performed by: INTERNAL MEDICINE

## 2020-08-21 RX ORDER — CEPHALEXIN 500 MG/1
500 CAPSULE ORAL 3 TIMES DAILY
Qty: 21 CAPSULE | Refills: 0 | Status: SHIPPED | OUTPATIENT
Start: 2020-08-21 | End: 2020-08-28

## 2020-08-21 NOTE — TELEPHONE ENCOUNTER
Patient called back to let PCP know she can not come in before her appt wed 8/26 to give urine sample.    Best call back [phone]

## 2020-08-21 NOTE — TELEPHONE ENCOUNTER
Spoke with [facility] and they will have a nurse out there tomorrow 8/22/20 to get her urine. Spoke with PT, she is ok with [facility] nurse, she just did not want physical therapy.

## 2020-08-21 NOTE — OUTREACH NOTE
Medical Week 2 Survey      Responses   [facility] patient discharged from?  [facility]   COVID-19 Test Status  Negative   Does the patient have one of the following disease processes/diagnoses(primary or secondary)?  Other   Week 2 attempt successful?  Yes   Call start time  0737   Discharge diagnosis  syncope   Call end time  0746   Meds reviewed with patient/caregiver?  Yes   Does the patient have all medications ordered at discharge?  No   What is keeping the patient from filling the prescriptions?  Financial   Nursing Interventions  Nurse advised patient to call provider   Is the patient taking all medications as directed (includes completed medication regime)?  No   What is preventing the patient from taking all medications as directed?  Other   Medication comments  Insurance would not cover B&O suppos, unable to afford medication.   Does the patient have a primary care provider?   Yes   Has the patient kept scheduled appointments due by today?  N/A   Comments  08/08/2020 PCP   What is the Home health agency?   [facility]             No home health (she didn't want [facility])   Pulse Ox monitoring  None   What is the patient's perception of their health status since discharge?  New symptoms unrelated to diagnosis [Has a UTI, has a called Dr. Mercado's office. She spoke with Beth. Waiting for pharmacy to call..]   Additional teach back comments  C/o UTI burning with urination and urine with bad odor. No fever. Has called Dr. Mercado for antibiotic.   Week 2 Call Completed?  Yes          Cinthia Siu RN

## 2020-08-23 LAB — BACTERIA SPEC AEROBE CULT: ABNORMAL

## 2020-08-26 ENCOUNTER — OFFICE VISIT (OUTPATIENT)
Dept: INTERNAL MEDICINE | Facility: CLINIC | Age: 80
End: 2020-08-26

## 2020-08-26 ENCOUNTER — INFUSION (OUTPATIENT)
Dept: ONCOLOGY | Facility: HOSPITAL | Age: 80
End: 2020-08-26

## 2020-08-26 ENCOUNTER — LAB (OUTPATIENT)
Dept: LAB | Facility: HOSPITAL | Age: 80
End: 2020-08-26

## 2020-08-26 VITALS
HEIGHT: 61 IN | TEMPERATURE: 97.6 F | BODY MASS INDEX: 37.41 KG/M2 | OXYGEN SATURATION: 95 % | DIASTOLIC BLOOD PRESSURE: 76 MMHG | SYSTOLIC BLOOD PRESSURE: 122 MMHG | HEART RATE: 74 BPM | RESPIRATION RATE: 18 BRPM

## 2020-08-26 VITALS
OXYGEN SATURATION: 95 % | HEART RATE: 66 BPM | TEMPERATURE: 98 F | SYSTOLIC BLOOD PRESSURE: 130 MMHG | DIASTOLIC BLOOD PRESSURE: 72 MMHG

## 2020-08-26 DIAGNOSIS — N30.90 RECURRENT CYSTITIS: ICD-10-CM

## 2020-08-26 DIAGNOSIS — D64.9 ANEMIA REQUIRING TRANSFUSIONS: Primary | ICD-10-CM

## 2020-08-26 DIAGNOSIS — N18.30 CKD STAGE 3 DUE TO TYPE 2 DIABETES MELLITUS (HCC): ICD-10-CM

## 2020-08-26 DIAGNOSIS — D46.C MYELODYSPLASTIC SYNDROME WITH 5Q DELETION (HCC): ICD-10-CM

## 2020-08-26 DIAGNOSIS — E11.22 CKD STAGE 3 DUE TO TYPE 2 DIABETES MELLITUS (HCC): ICD-10-CM

## 2020-08-26 DIAGNOSIS — D64.9 ANEMIA REQUIRING TRANSFUSIONS: ICD-10-CM

## 2020-08-26 DIAGNOSIS — E11.65 UNCONTROLLED TYPE 2 DIABETES MELLITUS WITH HYPERGLYCEMIA (HCC): Primary | ICD-10-CM

## 2020-08-26 DIAGNOSIS — I95.1 ORTHOSTATIC HYPOTENSION: ICD-10-CM

## 2020-08-26 DIAGNOSIS — F32.A ANXIETY AND DEPRESSION: ICD-10-CM

## 2020-08-26 DIAGNOSIS — F41.9 ANXIETY AND DEPRESSION: ICD-10-CM

## 2020-08-26 DIAGNOSIS — I10 ESSENTIAL HYPERTENSION: ICD-10-CM

## 2020-08-26 LAB
ABO GROUP BLD: NORMAL
BASOPHILS # BLD AUTO: 0.09 10*3/MM3 (ref 0–0.2)
BASOPHILS NFR BLD AUTO: 2.7 % (ref 0–1.5)
BLD GP AB SCN SERPL QL: NEGATIVE
DEPRECATED RDW RBC AUTO: 71.3 FL (ref 37–54)
EOSINOPHIL # BLD AUTO: 0.21 10*3/MM3 (ref 0–0.4)
EOSINOPHIL NFR BLD AUTO: 6.3 % (ref 0.3–6.2)
ERYTHROCYTE [DISTWIDTH] IN BLOOD BY AUTOMATED COUNT: 20.2 % (ref 12.3–15.4)
HCT VFR BLD AUTO: 26.2 % (ref 34–46.6)
HGB BLD-MCNC: 8 G/DL (ref 12–15.9)
IMM GRANULOCYTES # BLD AUTO: 0.04 10*3/MM3 (ref 0–0.05)
IMM GRANULOCYTES NFR BLD AUTO: 1.2 % (ref 0–0.5)
LYMPHOCYTES # BLD AUTO: 1.38 10*3/MM3 (ref 0.7–3.1)
LYMPHOCYTES NFR BLD AUTO: 41.6 % (ref 19.6–45.3)
MCH RBC QN AUTO: 30.4 PG (ref 26.6–33)
MCHC RBC AUTO-ENTMCNC: 30.5 G/DL (ref 31.5–35.7)
MCV RBC AUTO: 99.6 FL (ref 79–97)
MONOCYTES # BLD AUTO: 0.2 10*3/MM3 (ref 0.1–0.9)
MONOCYTES NFR BLD AUTO: 6 % (ref 5–12)
NEUTROPHILS NFR BLD AUTO: 1.4 10*3/MM3 (ref 1.7–7)
NEUTROPHILS NFR BLD AUTO: 42.2 % (ref 42.7–76)
PLATELET # BLD AUTO: 264 10*3/MM3 (ref 140–450)
PMV BLD AUTO: 11.3 FL (ref 6–12)
RBC # BLD AUTO: 2.63 10*6/MM3 (ref 3.77–5.28)
RH BLD: POSITIVE
T&S EXPIRATION DATE: NORMAL
WBC # BLD AUTO: 3.32 10*3/MM3 (ref 3.4–10.8)

## 2020-08-26 PROCEDURE — 85025 COMPLETE CBC W/AUTO DIFF WBC: CPT | Performed by: INTERNAL MEDICINE

## 2020-08-26 PROCEDURE — 86850 RBC ANTIBODY SCREEN: CPT | Performed by: INTERNAL MEDICINE

## 2020-08-26 PROCEDURE — 25010000002 EPOETIN ALFA PER 1000 UNITS: Performed by: INTERNAL MEDICINE

## 2020-08-26 PROCEDURE — 86923 COMPATIBILITY TEST ELECTRIC: CPT

## 2020-08-26 PROCEDURE — 99214 OFFICE O/P EST MOD 30 MIN: CPT | Performed by: INTERNAL MEDICINE

## 2020-08-26 PROCEDURE — 86900 BLOOD TYPING SEROLOGIC ABO: CPT | Performed by: INTERNAL MEDICINE

## 2020-08-26 PROCEDURE — 36415 COLL VENOUS BLD VENIPUNCTURE: CPT

## 2020-08-26 PROCEDURE — 96372 THER/PROPH/DIAG INJ SC/IM: CPT

## 2020-08-26 PROCEDURE — 86901 BLOOD TYPING SEROLOGIC RH(D): CPT | Performed by: INTERNAL MEDICINE

## 2020-08-26 RX ORDER — ACETAMINOPHEN 325 MG/1
650 TABLET ORAL ONCE
Status: CANCELLED | OUTPATIENT
Start: 2020-08-26 | End: 2020-08-26

## 2020-08-26 RX ORDER — DIPHENHYDRAMINE HCL 25 MG
25 CAPSULE ORAL ONCE
Status: CANCELLED | OUTPATIENT
Start: 2020-08-26 | End: 2020-08-26

## 2020-08-26 RX ORDER — SODIUM CHLORIDE 9 MG/ML
250 INJECTION, SOLUTION INTRAVENOUS AS NEEDED
Status: CANCELLED | OUTPATIENT
Start: 2020-08-26

## 2020-08-26 RX ADMIN — ERYTHROPOIETIN 60000 UNITS: 40000 INJECTION, SOLUTION INTRAVENOUS; SUBCUTANEOUS at 10:05

## 2020-08-26 NOTE — NURSING NOTE
Pt arrived per wheelchair today for labs and procrit injection. Hgb is 8.0 today. Reviewed labs with Dr. Headley and obtained verbal orders to transfuse one unite of RBCs tomorrow. Gave pt instructions, pt stated understanding.

## 2020-08-26 NOTE — PROGRESS NOTES
Joya Singh is a 80 y.o. female, who presents with a chief complaint of   Chief Complaint   Patient presents with   • Follow-up   • Hypothyroidism   • Heartburn       HPI   Pt here for f/ufollow up.    She c/o worsening dizziness.  bp has been better controlled.  She is on midodrine for her orthostatic hypotension. Sx get some better after transfusion but then she is feeling worse sooner than before.     Her left shoulder is worsening.  She wants to have surgery.  Given her myelodysplastic syndrome, chronic anticoagulation, orthostatic hypotension, diabetes,     Myelodysplastic syndrome - she has been getting frequent blood transfusions.  She is getting another transfusion tomorrow.  They are currently transfusing for hgb 8 or less    Recurrent UTI - pt with recent UTI and is on keflex (appropriate per culture results).  She sees dr. Fall soon.     Home health is helping follow the pt at home.  She was uncomfortable with the therapist and asked him not to come back.      She has been coughing more.  The albuterol helps some.  She wears her oxygen at night.       DM - glucoses coming down.  Glucose 49 this am.  Most am glucoses 's.  We discussed that if her glucoses stay low she needs to decrease her tresiba dose (currently 54 units daily)      The following portions of the patient's history were reviewed and updated as appropriate: allergies, current medications, past family history, past medical history, past social history, past surgical history and problem list.    Allergies: Baclofen; Codeine; Lisinopril; Morphine; and Penicillins    Review of Systems   Constitutional: Positive for fatigue. Negative for fever.   HENT: Negative.    Eyes: Negative.    Respiratory: Positive for cough.    Cardiovascular: Negative.    Gastrointestinal: Negative.    Endocrine: Negative.    Genitourinary: Negative.    Musculoskeletal: Negative.    Skin: Negative.    Allergic/Immunologic: Negative.    Neurological: Negative.     Hematological: Negative.    Psychiatric/Behavioral: Negative.    All other systems reviewed and are negative.            Wt Readings from Last 3 Encounters:   08/12/20 89.8 kg (198 lb)   08/04/20 88 kg (194 lb)   07/29/20 88.4 kg (194 lb 12.8 oz)     Temp Readings from Last 3 Encounters:   08/26/20 97.6 °F (36.4 °C) (Temporal)   08/26/20 98 °F (36.7 °C)   08/19/20 98 °F (36.7 °C) (Infrared)     BP Readings from Last 3 Encounters:   08/26/20 122/76   08/26/20 130/72   08/13/20 133/72     Pulse Readings from Last 3 Encounters:   08/26/20 74   08/26/20 66   08/13/20 60     Body mass index is 37.41 kg/m².  @LASTSAO2(3)@    Physical Exam   Constitutional: She is oriented to person, place, and time. She appears well-developed and well-nourished. No distress.   HENT:   Head: Normocephalic and atraumatic.   Right Ear: External ear normal.   Left Ear: External ear normal.   Nose: Nose normal.   Mouth/Throat: Oropharynx is clear and moist.   Eyes: Pupils are equal, round, and reactive to light. Conjunctivae and EOM are normal.   Neck: Normal range of motion. Neck supple.   Cardiovascular: Normal rate, regular rhythm, normal heart sounds and intact distal pulses.   Pulmonary/Chest: Effort normal and breath sounds normal. No respiratory distress. She has no wheezes.   Musculoskeletal: Normal range of motion.   Normal gait   Neurological: She is alert and oriented to person, place, and time.   Skin: Skin is warm and dry.   Psychiatric: She has a normal mood and affect. Her behavior is normal. Judgment and thought content normal.   Nursing note and vitals reviewed.      Results for orders placed or performed in visit on 08/26/20   CBC Auto Differential   Result Value Ref Range    WBC 3.32 (L) 3.40 - 10.80 10*3/mm3    RBC 2.63 (L) 3.77 - 5.28 10*6/mm3    Hemoglobin 8.0 (L) 12.0 - 15.9 g/dL    Hematocrit 26.2 (L) 34.0 - 46.6 %    MCV 99.6 (H) 79.0 - 97.0 fL    MCH 30.4 26.6 - 33.0 pg    MCHC 30.5 (L) 31.5 - 35.7 g/dL    RDW 20.2  (H) 12.3 - 15.4 %    RDW-SD 71.3 (H) 37.0 - 54.0 fl    MPV 11.3 6.0 - 12.0 fL    Platelets 264 140 - 450 10*3/mm3    Neutrophil % 42.2 (L) 42.7 - 76.0 %    Lymphocyte % 41.6 19.6 - 45.3 %    Monocyte % 6.0 5.0 - 12.0 %    Eosinophil % 6.3 (H) 0.3 - 6.2 %    Basophil % 2.7 (H) 0.0 - 1.5 %    Immature Grans % 1.2 (H) 0.0 - 0.5 %    Neutrophils, Absolute 1.40 (L) 1.70 - 7.00 10*3/mm3    Lymphocytes, Absolute 1.38 0.70 - 3.10 10*3/mm3    Monocytes, Absolute 0.20 0.10 - 0.90 10*3/mm3    Eosinophils, Absolute 0.21 0.00 - 0.40 10*3/mm3    Basophils, Absolute 0.09 0.00 - 0.20 10*3/mm3    Immature Grans, Absolute 0.04 0.00 - 0.05 10*3/mm3     *Note: Due to a large number of results and/or encounters for the requested time period, some results have not been displayed. A complete set of results can be found in Results Review.           Joya was seen today for follow-up, hypothyroidism and heartburn.    Diagnoses and all orders for this visit:    Uncontrolled type 2 diabetes mellitus with hyperglycemia (CMS/HCC)- glucoses now on lower side.  Will monitor closely and decrease tresiba if glucoses remain low. Discussed importance of avoiding hypoglycemia.     Recurrent cystitis - Cont keflex.     Orthostatic hypotension - Cont midodrine.    Anxiety and depression - stable    Essential hypertension - monitor for orthostatic hypotension.  Cont midodrine.    Myelodysplastic syndrome with 5q deletion (CMS/HCC) - dizziness is chronic but dizziness and fatigue likely exacerbated by MDS and her anemia.  She is still on procrit and getting transfusions every other week.       Left shoulder pain - I advised her to avoid shoulder surgery if at all possible.  She is very high risk bc of ckd, dm, hx blood clots, and MDS. Cont f/u with ortho.    Continue current medications.  Encouraged healthy diet/exercise.  OV labs in  1  month.  Pt to call sooner if any other issues arise.        Outpatient Medications Prior to Visit   Medication Sig  "Dispense Refill   • ACCU-CHEK FASTCLIX LANCETS misc TEST 3-4 TIMES DAILY AS DIRECTED 400 each 3   • ACCU-CHEK SMARTVIEW test strip TEST BLOOD SUGAR THREE TIMES DAILY OR AS DIRECTED 300 each 3   • acetaminophen (TYLENOL) 325 MG tablet Take 2 tablets by mouth Every 6 (Six) Hours As Needed for Mild Pain . OTC product 40 tablet 0   • albuterol sulfate  (90 Base) MCG/ACT inhaler Inhale 2 puffs Every 4 (Four) Hours As Needed for Wheezing. 1 inhaler 3   • atorvastatin (LIPITOR) 10 MG tablet TAKE ONE TABLET BY MOUTH AT BEDTIME 90 tablet 1   • Blood Glucose Monitoring Suppl (ACCU-CHEK KENNETH SMARTVIEW) w/Device kit TEST blood sugar three times daily or as directed 1 kit 0   • cephalexin (Keflex) 500 MG capsule Take 1 capsule by mouth 3 (Three) Times a Day for 7 days. 21 capsule 0   • Continuous Blood Gluc  (FREESTYLE MATILDA 14 DAY READER) device 1 each 6 (Six) Times a Day. 1 Device 0   • Continuous Blood Gluc Sensor (FREESTYLE MATILDA 14 DAY SENSOR) misc 1 each 6 (Six) Times a Day. 2 each 11   • desvenlafaxine (PRISTIQ) 50 MG 24 hr tablet TAKE ONE TABLET BY MOUTH EVERY DAY 90 tablet 1   • diphenhydrAMINE (BENADRYL) 25 mg capsule Take 25 mg by mouth Every 6 (Six) Hours As Needed for Itching.     • ELIQUIS 5 MG tablet tablet TAKE ONE TABLET BY MOUTH TWICE DAILY 180 tablet 0   • furosemide (LASIX) 20 MG tablet Take 20 mg by mouth Daily.     • gabapentin (NEURONTIN) 400 MG capsule TAKE ONE CAPSULE BY MOUTH EVERY MORNING, AT NOON, AND TWO AT BEDTIME 120 capsule 2   • insulin aspart (novoLOG) 100 UNIT/ML injection Inject 5 Units under the skin into the appropriate area as directed 3 (Three) Times a Day With Meals.  12   • levothyroxine (SYNTHROID, LEVOTHROID) 88 MCG tablet TAKE ONE TABLET BY MOUTH EVERY DAY 90 tablet 1   • midodrine (PROAMATINE) 5 MG tablet TAKE ONE TABLET BY MOUTH THREE TIMES DAILY BEFORE MEALS 90 tablet 0   • Needle, Disp, (BD DISP NEEDLES) 30G X 1/2\" misc To be used 3 times daily with Novolog Flexpen. " 100 each 5   • nystatin (MYCOSTATIN) 458081 UNIT/GM powder Apply  topically to the appropriate area as directed 2 (Two) Times a Day. 60 g 2   • O2 (OXYGEN) Inhale 2 L/min Every Night. Uses 2L  overnight only     • omeprazole (priLOSEC) 40 MG capsule TAKE ONE CAPSULE BY MOUTH EVERY DAY 90 capsule 1   • opium-belladonna (B&O SUPPRETTES) 16.2-30 MG suppository Insert 1 suppository into the rectum Every 8 (Eight) Hours As Needed for Bladder Spasms. 4 suppository 0   • potassium chloride (K-DUR) 10 MEQ CR tablet Take 10 mEq by mouth Daily.     • TRESIBA FLEXTOUCH 200 UNIT/ML solution pen-injector pen injection INJECT 54 UNITS subcutaneously AT BEDTIME 9 mL 11   • ULTICARE MINI PEN NEEDLES 31G X 6 MM misc USE TO INJECT INSULINS 4 TIMES DAILY AS DIRECTED 400 each 3     No facility-administered medications prior to visit.      No orders of the defined types were placed in this encounter.    [unfilled]  There are no discontinued medications.      Return in about 1 month (around 9/26/2020) for Recheck, labs.

## 2020-08-27 ENCOUNTER — HOSPITAL ENCOUNTER (OUTPATIENT)
Dept: INFUSION THERAPY | Facility: HOSPITAL | Age: 80
Discharge: HOME | End: 2020-08-27
Admitting: INTERNAL MEDICINE

## 2020-08-27 ENCOUNTER — READMISSION MANAGEMENT (OUTPATIENT)
Dept: CALL CENTER | Facility: HOSPITAL | Age: 80
End: 2020-08-27

## 2020-08-27 VITALS
RESPIRATION RATE: 16 BRPM | TEMPERATURE: 97.2 F | SYSTOLIC BLOOD PRESSURE: 143 MMHG | OXYGEN SATURATION: 96 % | DIASTOLIC BLOOD PRESSURE: 61 MMHG | HEART RATE: 70 BPM

## 2020-08-27 DIAGNOSIS — D64.9 ANEMIA REQUIRING TRANSFUSIONS: ICD-10-CM

## 2020-08-27 PROCEDURE — A9270 NON-COVERED ITEM OR SERVICE: HCPCS | Performed by: INTERNAL MEDICINE

## 2020-08-27 PROCEDURE — 63710000001 ACETAMINOPHEN 325 MG TABLET: Performed by: INTERNAL MEDICINE

## 2020-08-27 PROCEDURE — 36430 TRANSFUSION BLD/BLD COMPNT: CPT

## 2020-08-27 PROCEDURE — 86900 BLOOD TYPING SEROLOGIC ABO: CPT

## 2020-08-27 PROCEDURE — P9016 RBC LEUKOCYTES REDUCED: HCPCS

## 2020-08-27 PROCEDURE — 63710000001 DIPHENHYDRAMINE PER 50 MG: Performed by: INTERNAL MEDICINE

## 2020-08-27 RX ORDER — DIPHENHYDRAMINE HCL 25 MG
25 CAPSULE ORAL ONCE
Status: COMPLETED | OUTPATIENT
Start: 2020-08-27 | End: 2020-08-27

## 2020-08-27 RX ORDER — SODIUM CHLORIDE 9 MG/ML
INJECTION, SOLUTION INTRAVENOUS
Status: COMPLETED
Start: 2020-08-27 | End: 2020-08-27

## 2020-08-27 RX ORDER — ACETAMINOPHEN 325 MG/1
650 TABLET ORAL ONCE
Status: COMPLETED | OUTPATIENT
Start: 2020-08-27 | End: 2020-08-27

## 2020-08-27 RX ORDER — SODIUM CHLORIDE 9 MG/ML
250 INJECTION, SOLUTION INTRAVENOUS AS NEEDED
Status: DISCONTINUED | OUTPATIENT
Start: 2020-08-27 | End: 2020-08-29 | Stop reason: HOSPADM

## 2020-08-27 RX ADMIN — SODIUM CHLORIDE 250 ML: 9 INJECTION, SOLUTION INTRAVENOUS at 12:35

## 2020-08-27 RX ADMIN — DIPHENHYDRAMINE HYDROCHLORIDE 25 MG: 25 CAPSULE ORAL at 09:51

## 2020-08-27 RX ADMIN — ACETAMINOPHEN 650 MG: 325 TABLET, FILM COATED ORAL at 09:52

## 2020-08-27 NOTE — NURSING NOTE
NURSING PROGRESS NOTE: Patient arrived to [facility] per W/C at 0940 for scheduled blood transfusion.  Consent signed, history and meds reviewed. Procedure performed without incident and completed at 1235.  AVS reviewed and a copy provided.  1300, awaiting transportation home.  ANISH Kathleen

## 2020-08-27 NOTE — PATIENT INSTRUCTIONS
"  Call Dr. Elieser Headley, [facility] at [phone] if you have any problems or concerns.    We know you have a Choice in healthcare and appreciate you using [facility].  Our purpose is to provide you \"Excellent Care\".  We hope that you will always choose us in the future and continue to recommend us to your family and friends.              Blood Transfusion, Adult, Care After  This sheet gives you information about how to care for yourself after your procedure. Your doctor may also give you more specific instructions. If you have problems or questions, contact your doctor.  Follow these instructions at home:    · Take over-the-counter and prescription medicines only as told by your doctor.  · Go back to your normal activities as told by your doctor.  · Follow instructions from your doctor about how to take care of the area where an IV tube was put into your vein (insertion site). Make sure you:  ? Wash your hands with soap and water before you change your bandage (dressing). If there is no soap and water, use hand .  ? Change your bandage as told by your doctor.  · Check your IV insertion site every day for signs of infection. Check for:  ? More redness, swelling, or pain.  ? More fluid or blood.  ? Warmth.  ? Pus or a bad smell.  Contact a doctor if:  · You have more redness, swelling, or pain around the IV insertion site.  · You have more fluid or blood coming from the IV insertion site.  · Your IV insertion site feels warm to the touch.  · You have pus or a bad smell coming from the IV insertion site.  · Your pee (urine) turns pink, red, or brown.  · You feel weak after doing your normal activities.  Get help right away if:  · You have signs of a serious allergic or body defense (immune) system reaction, including:  ? Itchiness.  ? Hives.  ? Trouble breathing.  ? Anxiety.  ? Pain in your chest or lower back.  ? Fever, flushing, and chills.  ? Fast pulse.  ? Rash.  ? Watery poop " (diarrhea).  ? Throwing up (vomiting).  ? Dark pee.  ? Serious headache.  ? Dizziness.  ? Stiff neck.  ? Yellow color in your face or the white parts of your eyes (jaundice).  Summary  · After a blood transfusion, return to your normal activities as told by your doctor.  · Every day, check for signs of infection where the IV tube was put into your vein.  · Some signs of infection are warm skin, more redness and pain, more fluid or blood, and pus or a bad smell where the needle went in.  · Contact your doctor if you feel weak or have any unusual symptoms.  This information is not intended to replace advice given to you by your health care provider. Make sure you discuss any questions you have with your health care provider.  Document Released: 01/08/2016 Document Revised: 04/24/2019 Document Reviewed: 08/11/2017  Elsevier Patient Education © 2020 [employer]

## 2020-08-27 NOTE — OUTREACH NOTE
Medical Week 3 Survey      Responses   [facility] patient discharged from?  [facility]   COVID-19 Test Status  Negative   Does the patient have one of the following disease processes/diagnoses(primary or secondary)?  Other   Week 3 attempt successful?  Yes   Call start time  0717   Rescheduled  Rescheduled-pt requested   Call end time  0717   Discharge diagnosis  syncope          Parris Cooley RN

## 2020-08-28 ENCOUNTER — READMISSION MANAGEMENT (OUTPATIENT)
Dept: CALL CENTER | Facility: HOSPITAL | Age: 80
End: 2020-08-28

## 2020-08-28 ENCOUNTER — TELEPHONE (OUTPATIENT)
Dept: INTERNAL MEDICINE | Facility: CLINIC | Age: 80
End: 2020-08-28

## 2020-08-28 LAB
BH BB BLOOD EXPIRATION DATE: NORMAL
BH BB BLOOD TYPE BARCODE: 7300
BH BB DISPENSE STATUS: NORMAL
BH BB PRODUCT CODE: NORMAL
BH BB UNIT NUMBER: NORMAL
CROSSMATCH INTERPRETATION: NORMAL
UNIT  ABO: NORMAL
UNIT  RH: NORMAL

## 2020-08-28 NOTE — OUTREACH NOTE
Medical Week 3 Survey      Responses   [facility] patient discharged from?  [facility]   Does the patient have one of the following disease processes/diagnoses(primary or secondary)?  Other   Week 3 attempt successful?  Yes   Call start time  0958   Call end time  1001   Medication alerts for this patient   no medication , has taken antibiotic for an UTI   Meds reviewed with patient/caregiver?  Yes   Is the patient taking all medications as directed (includes completed medication regime)?  Yes   Comments regarding appointments  has kept her appointments   Has the patient kept scheduled appointments due by today?  Yes   Comments  recieved blood yesterday   Pulse Ox monitoring  None   What is the patient's perception of their health status since discharge?  Improving [needing someone to help her with her injections]   Week 3 Call Completed?  Yes   Wrap up additional comments  needing help with injections, has spoken with provider          Nirmala Sher RN

## 2020-08-28 NOTE — TELEPHONE ENCOUNTER
PATIENT WAS CALLING TO CHECK ON THE STATUS OF HAVING HOME HEALTH     PLEASE FOLLOW UP WITH PATIENT   [phone]

## 2020-08-31 DIAGNOSIS — M25.561 ACUTE PAIN OF RIGHT KNEE: Primary | ICD-10-CM

## 2020-08-31 DIAGNOSIS — N39.0 RECURRENT UTI: ICD-10-CM

## 2020-08-31 DIAGNOSIS — F32.A ANXIETY AND DEPRESSION: ICD-10-CM

## 2020-08-31 DIAGNOSIS — E11.65 UNCONTROLLED TYPE 2 DIABETES MELLITUS WITH HYPERGLYCEMIA (HCC): ICD-10-CM

## 2020-08-31 DIAGNOSIS — I10 ESSENTIAL HYPERTENSION: ICD-10-CM

## 2020-08-31 DIAGNOSIS — F41.9 ANXIETY AND DEPRESSION: ICD-10-CM

## 2020-09-02 ENCOUNTER — LAB (OUTPATIENT)
Dept: LAB | Facility: HOSPITAL | Age: 80
End: 2020-09-02

## 2020-09-02 ENCOUNTER — INFUSION (OUTPATIENT)
Dept: ONCOLOGY | Facility: HOSPITAL | Age: 80
End: 2020-09-02

## 2020-09-02 VITALS — TEMPERATURE: 98.4 F

## 2020-09-02 DIAGNOSIS — D46.C MYELODYSPLASTIC SYNDROME WITH 5Q DELETION (HCC): ICD-10-CM

## 2020-09-02 DIAGNOSIS — N18.30 CKD STAGE 3 DUE TO TYPE 2 DIABETES MELLITUS (HCC): ICD-10-CM

## 2020-09-02 DIAGNOSIS — D64.9 ANEMIA REQUIRING TRANSFUSIONS: ICD-10-CM

## 2020-09-02 DIAGNOSIS — E11.22 CKD STAGE 3 DUE TO TYPE 2 DIABETES MELLITUS (HCC): ICD-10-CM

## 2020-09-02 DIAGNOSIS — D64.9 ANEMIA REQUIRING TRANSFUSIONS: Primary | ICD-10-CM

## 2020-09-02 LAB
BASOPHILS # BLD AUTO: 0.1 10*3/MM3 (ref 0–0.2)
BASOPHILS NFR BLD AUTO: 2.1 % (ref 0–1.5)
DEPRECATED RDW RBC AUTO: 66.7 FL (ref 37–54)
EOSINOPHIL # BLD AUTO: 0.28 10*3/MM3 (ref 0–0.4)
EOSINOPHIL NFR BLD AUTO: 6 % (ref 0.3–6.2)
ERYTHROCYTE [DISTWIDTH] IN BLOOD BY AUTOMATED COUNT: 18.9 % (ref 12.3–15.4)
HCT VFR BLD AUTO: 29.6 % (ref 34–46.6)
HGB BLD-MCNC: 9.2 G/DL (ref 12–15.9)
IMM GRANULOCYTES # BLD AUTO: 0.05 10*3/MM3 (ref 0–0.05)
IMM GRANULOCYTES NFR BLD AUTO: 1.1 % (ref 0–0.5)
LYMPHOCYTES # BLD AUTO: 1.86 10*3/MM3 (ref 0.7–3.1)
LYMPHOCYTES NFR BLD AUTO: 39.7 % (ref 19.6–45.3)
MCH RBC QN AUTO: 30.2 PG (ref 26.6–33)
MCHC RBC AUTO-ENTMCNC: 31.1 G/DL (ref 31.5–35.7)
MCV RBC AUTO: 97 FL (ref 79–97)
MONOCYTES # BLD AUTO: 0.21 10*3/MM3 (ref 0.1–0.9)
MONOCYTES NFR BLD AUTO: 4.5 % (ref 5–12)
NEUTROPHILS NFR BLD AUTO: 2.19 10*3/MM3 (ref 1.7–7)
NEUTROPHILS NFR BLD AUTO: 46.6 % (ref 42.7–76)
PLATELET # BLD AUTO: 309 10*3/MM3 (ref 140–450)
PMV BLD AUTO: 11.5 FL (ref 6–12)
RBC # BLD AUTO: 3.05 10*6/MM3 (ref 3.77–5.28)
WBC # BLD AUTO: 4.69 10*3/MM3 (ref 3.4–10.8)

## 2020-09-02 PROCEDURE — 85025 COMPLETE CBC W/AUTO DIFF WBC: CPT | Performed by: INTERNAL MEDICINE

## 2020-09-02 PROCEDURE — 25010000002 EPOETIN ALFA PER 1000 UNITS: Performed by: INTERNAL MEDICINE

## 2020-09-02 PROCEDURE — 36415 COLL VENOUS BLD VENIPUNCTURE: CPT

## 2020-09-02 PROCEDURE — 96372 THER/PROPH/DIAG INJ SC/IM: CPT

## 2020-09-02 RX ORDER — PHENAZOPYRIDINE HYDROCHLORIDE 200 MG/1
200 TABLET, FILM COATED ORAL 3 TIMES DAILY
COMMUNITY
Start: 2020-07-31 | End: 2021-01-01 | Stop reason: HOSPADM

## 2020-09-02 RX ORDER — FLUCONAZOLE 150 MG/1
TABLET ORAL
COMMUNITY
Start: 2020-07-15 | End: 2020-12-06

## 2020-09-02 RX ADMIN — ERYTHROPOIETIN 60000 UNITS: 40000 INJECTION, SOLUTION INTRAVENOUS; SUBCUTANEOUS at 10:38

## 2020-09-04 ENCOUNTER — READMISSION MANAGEMENT (OUTPATIENT)
Dept: CALL CENTER | Facility: HOSPITAL | Age: 80
End: 2020-09-04

## 2020-09-04 NOTE — OUTREACH NOTE
Medical Week 4 Survey      Responses   [facility] patient discharged from?  [facility]   COVID-19 Test Status  Negative   Does the patient have one of the following disease processes/diagnoses(primary or secondary)?  Other   Week 4 attempt successful?  No          Sheila Whitehead LPN

## 2020-09-08 ENCOUNTER — OFFICE VISIT (OUTPATIENT)
Dept: ORTHOPEDIC SURGERY | Facility: CLINIC | Age: 80
End: 2020-09-08

## 2020-09-08 ENCOUNTER — EPISODE CHANGES (OUTPATIENT)
Dept: CASE MANAGEMENT | Facility: OTHER | Age: 80
End: 2020-09-08

## 2020-09-08 DIAGNOSIS — M19.012 PRIMARY LOCALIZED OSTEOARTHROSIS OF LEFT SHOULDER REGION: Primary | ICD-10-CM

## 2020-09-08 PROCEDURE — 99214 OFFICE O/P EST MOD 30 MIN: CPT | Performed by: NURSE PRACTITIONER

## 2020-09-08 PROCEDURE — 73030 X-RAY EXAM OF SHOULDER: CPT | Performed by: NURSE PRACTITIONER

## 2020-09-08 PROCEDURE — 20610 DRAIN/INJ JOINT/BURSA W/O US: CPT | Performed by: NURSE PRACTITIONER

## 2020-09-08 RX ORDER — NITROFURANTOIN 25; 75 MG/1; MG/1
100 CAPSULE ORAL 2 TIMES DAILY
COMMUNITY
Start: 2020-09-04 | End: 2020-11-11 | Stop reason: ALTCHOICE

## 2020-09-08 RX ORDER — MIDODRINE HYDROCHLORIDE 5 MG/1
TABLET ORAL
Qty: 90 TABLET | Refills: 0 | Status: SHIPPED | OUTPATIENT
Start: 2020-09-08 | End: 2020-10-09

## 2020-09-08 RX ORDER — ATORVASTATIN CALCIUM 10 MG/1
TABLET, FILM COATED ORAL
Qty: 90 TABLET | Refills: 1 | Status: SHIPPED | OUTPATIENT
Start: 2020-09-08 | End: 2021-01-01

## 2020-09-08 RX ADMIN — LIDOCAINE HYDROCHLORIDE 4 ML: 10 INJECTION, SOLUTION EPIDURAL; INFILTRATION; INTRACAUDAL; PERINEURAL at 09:46

## 2020-09-08 RX ADMIN — BETAMETHASONE SODIUM PHOSPHATE AND BETAMETHASONE ACETATE 12 MG: 3; 3 INJECTION, SUSPENSION INTRA-ARTICULAR; INTRALESIONAL; INTRAMUSCULAR; SOFT TISSUE at 09:46

## 2020-09-08 NOTE — PROGRESS NOTES
Subjective:     Patient ID: Joya Singh is a 80 y.o. female.    Chief Complaint:  Follow-up left shoulder pain  History of Present Illness  Joya Singh returns to clinic for follow-up of her left shoulder.  Maximal tenderness present today at the lateral aspect of the acromion increased pain noted with attempting to lift out in front and come out to side.  Received corticosteroid injections in the past approximately 1 year ago anterior aspect.  Denies presence of numbness but does experience tingling from time to time radiating down the left upper extremity.  Increased pain with pain has continued with application of heat with minimal symptom relief.  Rates discomfort 9-10 out of 10.  States she is not a candidate for surgery due to comorbidities.  Pain does radiate to the left side of her neck.  Denies other concerns present time.       Social History     Occupational History     Employer: RETIRED   Tobacco Use   • Smoking status: Never Smoker   • Smokeless tobacco: Never Used   • Tobacco comment: CAFFEINE USE: NONE   Substance and Sexual Activity   • Alcohol use: No   • Drug use: No   • Sexual activity: Defer     Comment: EXERCISE - RARELY      Past Medical History:   Diagnosis Date   • Allergic rhinitis    • Anemia    • Anxiety    • Appetite absent    • Arthritis    • Asthma    • Back pain    • Bell's palsy    • Black tarry stools    • Blood in stool    • Chronic fatigue    • CKD (chronic kidney disease) stage 3, GFR 30-59 ml/min (CMS/[facility])    • Community acquired pneumonia of left lung 10/25/2018   • Cough    • Depression    • Diabetes mellitus (CMS/[facility])     LAST A1C 6   • Diabetic gastroparesis (CMS/[facility]) 2/19/2016   • Difficulty walking    • Excessive urination at night    • Frequent urination    • GERD (gastroesophageal reflux disease)    • GI bleed    • Gout    • H/O blood clots     LEFT LEG 7 OR 8 YEARS AGO   • Heat intolerance    • History of fall 10/2018   • History of prior pregnancies     x8, miscarriage 5    • History of transfusion 11/2018    due to anemia   • Hyperlipidemia    • Hypertension    • Hypothyroidism    • Normal coronary arteries     by cath 2013   • Orthostatic hypotension    • AARON (obstructive sleep apnea)     DOESNT WEAR REGULARLY   • PONV (postoperative nausea and vomiting)    • Skin cancer    • Stroke (CMS/HCC)     Several mini-strokes   • TIA (transient ischemic attack)     LAST TIA JULY 2017   • Urination pain    • UTI (urinary tract infection)     Dec 2018 and Jan 2019     Past Surgical History:   Procedure Laterality Date   • BACK SURGERY      HARDWARE   • CHOLECYSTECTOMY      OPEN   • COLONOSCOPY  2011    due for repeat in 2021   • COLONOSCOPY N/A 10/28/2018    Procedure: COLONOSCOPY;  Surgeon: Emmanuel Rogers MD;  Location: [facility] OR;  Service: Gastroenterology   • ENDOSCOPY N/A 10/26/2018    Procedure: ESOPHAGOGASTRODUODENOSCOPY;  Surgeon: Emmanuel Rogers MD;  Location: [facility] OR;  Service: Gastroenterology   • HYSTERECTOMY      PARTIAL    • KNEE SURGERY     • NECK SURGERY     • SPINE SURGERY     • TUMOR REMOVAL Left     Leg   • UPPER GASTROINTESTINAL ENDOSCOPY  2014    gastritis.  done by dr. hastings       Family History   Problem Relation Age of Onset   • Lupus Mother    • Heart failure Mother 59   • Heart disease Other    • Hypertension Other    • Heart attack Father    • Breast cancer Neg Hx          Review of Systems   Constitutional: Negative for chills, diaphoresis, fever and unexpected weight change.   HENT: Negative for hearing loss, nosebleeds, sore throat and tinnitus.    Eyes: Negative for pain and visual disturbance.   Respiratory: Negative for cough, shortness of breath and wheezing.    Cardiovascular: Negative for chest pain and palpitations.   Gastrointestinal: Negative for abdominal pain, diarrhea, nausea and vomiting.   Endocrine: Negative for cold intolerance, heat intolerance and polydipsia.   Genitourinary: Negative for difficulty urinating, dysuria  and hematuria.   Musculoskeletal: Positive for arthralgias. Negative for joint swelling and myalgias.   Skin: Negative for rash and wound.   Allergic/Immunologic: Negative for environmental allergies.   Neurological: Negative for dizziness, syncope and numbness.   Hematological: Does not bruise/bleed easily.   Psychiatric/Behavioral: Negative for dysphoric mood and sleep disturbance. The patient is not nervous/anxious.            Objective:  Physical Exam    General: No acute distress.  Eyes: conjunctiva clear; pupils equally round and reactive  ENT: external ears and nose atraumatic; oropharynx clear  CV: no peripheral edema  Resp: normal respiratory effort  Skin: no rashes or wounds; normal turgor  Psych: mood and affect appropriate; recent and remote memory intact    There were no vitals filed for this visit.  There were no vitals filed for this visit.  There is no height or weight on file to calculate BMI.      Ortho Exam     Left shoulder exam:  Active forward flexion 45 degrees, passive 90 degrees,  External rotation 20 degrees, passive 45 degrees  Internal rotation to side  Strength 3 out of 5 all planes  Positive sensation light touch all distributions left upper extremity  Maximal tenderness lateral aspect of the acromion, AC joint, anterior joint line  2+ distal radial pulse  Elbow extension 0 degrees, flexion 100 degrees with crepitus noted with motion    Imaging:  Left Shoulder X-Ray  Indication: Pain  AP Internal and External Rotation views    Findings:  No fracture  Normal soft tissues  Advanced glenohumeral arthritis, AC joint arthropathy, subchondral cyst proximal humerus  prior studies were available for comparison.    Assessment:        1. Primary localized osteoarthrosis of left shoulder region           Plan:  1.  Discussed plan of care with patient.  Is experiencing maximal tenderness lateral aspect of the acromion, left.  Wishes to proceed with corticosteroid injection left shoulder subacromial  bursa lateral approach.  Plan to see her back in clinic in 4 weeks to reevaluate.  She verbalized understanding of all information agrees with plan of care.  Denies other concerns present this time.  Large Joint Arthrocentesis: L subacromial bursa  Date/Time: 9/8/2020 9:46 AM  Consent given by: patient  Site marked: site marked  Timeout: Immediately prior to procedure a time out was called to verify the correct patient, procedure, equipment, support staff and site/side marked as required   Supporting Documentation  Indications: pain   Procedure Details  Location: shoulder - L subacromial bursa  Preparation: Patient was prepped and draped in the usual sterile fashion  Needle size: 22 G  Approach: lateral  Medications administered: 4 mL lidocaine PF 1% 1 %; 12 mg betamethasone acetate-betamethasone sodium phosphate 6 (3-3) MG/ML  Patient tolerance: patient tolerated the procedure well with no immediate complications          Orders:  Orders Placed This Encounter   Procedures   • Large Joint Arthrocentesis: L subacromial bursa   • XR Shoulder 2+ View Left       Medications:  No orders of the defined types were placed in this encounter.      Followup:  No follow-ups on file.    Joya was seen today for follow-up.    Diagnoses and all orders for this visit:    Primary localized osteoarthrosis of left shoulder region  -     XR Shoulder 2+ View Left    Other orders  -     Large Joint Arthrocentesis: L subacromial bursa      Dictated utilizing Dragon dictation

## 2020-09-09 ENCOUNTER — TELEPHONE (OUTPATIENT)
Dept: INTERNAL MEDICINE | Facility: CLINIC | Age: 80
End: 2020-09-09

## 2020-09-09 ENCOUNTER — INFUSION (OUTPATIENT)
Dept: ONCOLOGY | Facility: HOSPITAL | Age: 80
End: 2020-09-09

## 2020-09-09 ENCOUNTER — LAB (OUTPATIENT)
Dept: LAB | Facility: HOSPITAL | Age: 80
End: 2020-09-09

## 2020-09-09 VITALS
OXYGEN SATURATION: 97 % | HEART RATE: 86 BPM | SYSTOLIC BLOOD PRESSURE: 153 MMHG | RESPIRATION RATE: 18 BRPM | DIASTOLIC BLOOD PRESSURE: 71 MMHG | TEMPERATURE: 98.5 F

## 2020-09-09 DIAGNOSIS — D64.9 ANEMIA REQUIRING TRANSFUSIONS: ICD-10-CM

## 2020-09-09 DIAGNOSIS — D64.9 ANEMIA REQUIRING TRANSFUSIONS: Primary | ICD-10-CM

## 2020-09-09 DIAGNOSIS — N18.30 CKD STAGE 3 DUE TO TYPE 2 DIABETES MELLITUS (HCC): ICD-10-CM

## 2020-09-09 DIAGNOSIS — E11.22 CKD STAGE 3 DUE TO TYPE 2 DIABETES MELLITUS (HCC): ICD-10-CM

## 2020-09-09 DIAGNOSIS — D46.C MYELODYSPLASTIC SYNDROME WITH 5Q DELETION (HCC): ICD-10-CM

## 2020-09-09 LAB
BASOPHILS # BLD AUTO: 0.14 10*3/MM3 (ref 0–0.2)
BASOPHILS NFR BLD AUTO: 2.2 % (ref 0–1.5)
DEPRECATED RDW RBC AUTO: 66.6 FL (ref 37–54)
EOSINOPHIL # BLD AUTO: 0.09 10*3/MM3 (ref 0–0.4)
EOSINOPHIL NFR BLD AUTO: 1.4 % (ref 0.3–6.2)
ERYTHROCYTE [DISTWIDTH] IN BLOOD BY AUTOMATED COUNT: 19.5 % (ref 12.3–15.4)
HCT VFR BLD AUTO: 26.8 % (ref 34–46.6)
HGB BLD-MCNC: 8.4 G/DL (ref 12–15.9)
IMM GRANULOCYTES # BLD AUTO: 0.11 10*3/MM3 (ref 0–0.05)
IMM GRANULOCYTES NFR BLD AUTO: 1.8 % (ref 0–0.5)
LYMPHOCYTES # BLD AUTO: 1.25 10*3/MM3 (ref 0.7–3.1)
LYMPHOCYTES NFR BLD AUTO: 19.9 % (ref 19.6–45.3)
MCH RBC QN AUTO: 30.7 PG (ref 26.6–33)
MCHC RBC AUTO-ENTMCNC: 31.3 G/DL (ref 31.5–35.7)
MCV RBC AUTO: 97.8 FL (ref 79–97)
MONOCYTES # BLD AUTO: 0.21 10*3/MM3 (ref 0.1–0.9)
MONOCYTES NFR BLD AUTO: 3.3 % (ref 5–12)
NEUTROPHILS NFR BLD AUTO: 4.47 10*3/MM3 (ref 1.7–7)
NEUTROPHILS NFR BLD AUTO: 71.4 % (ref 42.7–76)
PLATELET # BLD AUTO: 292 10*3/MM3 (ref 140–450)
PMV BLD AUTO: 11.4 FL (ref 6–12)
RBC # BLD AUTO: 2.74 10*6/MM3 (ref 3.77–5.28)
WBC # BLD AUTO: 6.27 10*3/MM3 (ref 3.4–10.8)

## 2020-09-09 PROCEDURE — 36415 COLL VENOUS BLD VENIPUNCTURE: CPT

## 2020-09-09 PROCEDURE — 96372 THER/PROPH/DIAG INJ SC/IM: CPT

## 2020-09-09 PROCEDURE — 25010000002 EPOETIN ALFA PER 1000 UNITS: Performed by: INTERNAL MEDICINE

## 2020-09-09 PROCEDURE — 85025 COMPLETE CBC W/AUTO DIFF WBC: CPT | Performed by: INTERNAL MEDICINE

## 2020-09-09 RX ORDER — BETAMETHASONE SODIUM PHOSPHATE AND BETAMETHASONE ACETATE 3; 3 MG/ML; MG/ML
12 INJECTION, SUSPENSION INTRA-ARTICULAR; INTRALESIONAL; INTRAMUSCULAR; SOFT TISSUE
Status: COMPLETED | OUTPATIENT
Start: 2020-09-08 | End: 2020-09-08

## 2020-09-09 RX ORDER — LIDOCAINE HYDROCHLORIDE 10 MG/ML
4 INJECTION, SOLUTION EPIDURAL; INFILTRATION; INTRACAUDAL; PERINEURAL
Status: COMPLETED | OUTPATIENT
Start: 2020-09-08 | End: 2020-09-08

## 2020-09-09 RX ADMIN — ERYTHROPOIETIN 60000 UNITS: 40000 INJECTION, SOLUTION INTRAVENOUS; SUBCUTANEOUS at 10:05

## 2020-09-09 NOTE — TELEPHONE ENCOUNTER
"PATIENT CALLED AND STATES SHE SEEN DR. SUTTON YESTDAY AND RECEIVED A CORTISONE SHOT AND NOW HER LEFT SHOULDER IS \" LOCKED\" SHE CANT MOVE IT. SHE CHANGED IT TO HER \"SHOULDER IS OUT OF SOCKET\".  SHE IS REQUESTING A PAIN MEDICATION. SHE GOES BACK IN WEEKS TO DR. SUTTON.   SHE IS IN TERRIBLE PAIN SHE CAN'T TAKE A SHOWER.    General acute hospital [address] [facility] - [phone] [facility] [phone]   [phone]    PATIENT CALL BACK NUMBER [phone]  "

## 2020-09-09 NOTE — NURSING NOTE
Pt arrived today per wheelchair for Labs with review and procrit injection. Pt Hgb is 8.4 today. Pt states that she feels really good and has no complaints. Pt given copy of labs and instructed to call with any questions or concerns prior to her next visit. Pt stated understanding.

## 2020-09-10 ENCOUNTER — TELEPHONE (OUTPATIENT)
Dept: ORTHOPEDIC SURGERY | Facility: CLINIC | Age: 80
End: 2020-09-10

## 2020-09-10 NOTE — TELEPHONE ENCOUNTER
Patient called requesting pain meds. I informed her we cannot give her pain medication. She said she didn't think we would be able to but tried anyway. She has called   office as well. She said she would call them back tomorrow and ask again.

## 2020-09-10 NOTE — TELEPHONE ENCOUNTER
Advised patient to call Dr. Wylie's office for advice/med, as Dr. Mercado is out of the office today.  Patient voiced understanding and agreed.

## 2020-09-10 NOTE — TELEPHONE ENCOUNTER
PATIENT IS CALLING BACK IN TO SEE IF SOMETHING CAN BE SENT FOR HER PAIN. SHE IS STRUGGLING BAD AND SHE NEEDS SOMETHING TO HELP HER UNTIL SHE CAN BE SEEN.  HER SHOULDER IS HURTING HER BAD.    CALLBACK NUMBER IS:  [phone]

## 2020-09-11 DIAGNOSIS — M25.511 CHRONIC RIGHT SHOULDER PAIN: Primary | ICD-10-CM

## 2020-09-11 DIAGNOSIS — G89.29 CHRONIC RIGHT SHOULDER PAIN: Primary | ICD-10-CM

## 2020-09-11 RX ORDER — HYDROCODONE BITARTRATE AND ACETAMINOPHEN 5; 325 MG/1; MG/1
1 TABLET ORAL EVERY 6 HOURS PRN
Qty: 10 TABLET | Refills: 0 | Status: SHIPPED | OUTPATIENT
Start: 2020-09-11 | End: 2020-09-16 | Stop reason: SDUPTHER

## 2020-09-11 NOTE — TELEPHONE ENCOUNTER
PT IS REQUESTING SOME PAIN MEDS TO BE CALLED IN FOR HER PAIN IN HER SHOULDERS.  PT STATES THAT SHE CALLED IN YESTERDAY AND IS IN A LOT OF PAIN.      PT CALL BACK  [phone]  PHARMACY  [place] [phone]

## 2020-09-16 ENCOUNTER — INFUSION (OUTPATIENT)
Dept: ONCOLOGY | Facility: HOSPITAL | Age: 80
End: 2020-09-16

## 2020-09-16 ENCOUNTER — OFFICE VISIT (OUTPATIENT)
Dept: ONCOLOGY | Facility: CLINIC | Age: 80
End: 2020-09-16

## 2020-09-16 ENCOUNTER — OFFICE VISIT (OUTPATIENT)
Dept: INTERNAL MEDICINE | Facility: CLINIC | Age: 80
End: 2020-09-16

## 2020-09-16 ENCOUNTER — LAB (OUTPATIENT)
Dept: LAB | Facility: HOSPITAL | Age: 80
End: 2020-09-16

## 2020-09-16 VITALS
BODY MASS INDEX: 37.38 KG/M2 | RESPIRATION RATE: 14 BRPM | OXYGEN SATURATION: 99 % | HEART RATE: 98 BPM | WEIGHT: 198 LBS | DIASTOLIC BLOOD PRESSURE: 74 MMHG | TEMPERATURE: 98 F | SYSTOLIC BLOOD PRESSURE: 148 MMHG | HEIGHT: 61 IN

## 2020-09-16 VITALS
RESPIRATION RATE: 16 BRPM | WEIGHT: 198 LBS | TEMPERATURE: 98.7 F | DIASTOLIC BLOOD PRESSURE: 78 MMHG | BODY MASS INDEX: 37.38 KG/M2 | OXYGEN SATURATION: 96 % | HEIGHT: 61 IN | HEART RATE: 70 BPM | SYSTOLIC BLOOD PRESSURE: 116 MMHG

## 2020-09-16 DIAGNOSIS — D46.C MYELODYSPLASTIC SYNDROME WITH 5Q DELETION (HCC): ICD-10-CM

## 2020-09-16 DIAGNOSIS — F41.9 ANXIETY AND DEPRESSION: ICD-10-CM

## 2020-09-16 DIAGNOSIS — E11.22 CKD STAGE 3 DUE TO TYPE 2 DIABETES MELLITUS (HCC): ICD-10-CM

## 2020-09-16 DIAGNOSIS — G89.29 CHRONIC RIGHT SHOULDER PAIN: ICD-10-CM

## 2020-09-16 DIAGNOSIS — D64.9 ANEMIA REQUIRING TRANSFUSIONS: ICD-10-CM

## 2020-09-16 DIAGNOSIS — E78.2 MIXED HYPERLIPIDEMIA: ICD-10-CM

## 2020-09-16 DIAGNOSIS — E83.111 IRON OVERLOAD, TRANSFUSIONAL: Primary | ICD-10-CM

## 2020-09-16 DIAGNOSIS — I50.32 CHRONIC DIASTOLIC CHF (CONGESTIVE HEART FAILURE) (HCC): ICD-10-CM

## 2020-09-16 DIAGNOSIS — E11.65 UNCONTROLLED TYPE 2 DIABETES MELLITUS WITH HYPERGLYCEMIA (HCC): Primary | ICD-10-CM

## 2020-09-16 DIAGNOSIS — D47.2 MONOCLONAL GAMMOPATHY OF UNKNOWN SIGNIFICANCE (MGUS): ICD-10-CM

## 2020-09-16 DIAGNOSIS — Z23 NEED FOR INFLUENZA VACCINATION: ICD-10-CM

## 2020-09-16 DIAGNOSIS — D64.9 ANEMIA REQUIRING TRANSFUSIONS: Primary | ICD-10-CM

## 2020-09-16 DIAGNOSIS — M25.511 CHRONIC RIGHT SHOULDER PAIN: ICD-10-CM

## 2020-09-16 DIAGNOSIS — E83.111 IRON OVERLOAD, TRANSFUSIONAL: ICD-10-CM

## 2020-09-16 DIAGNOSIS — D46.C MYELODYSPLASTIC SYNDROME WITH 5Q DELETION (HCC): Primary | ICD-10-CM

## 2020-09-16 DIAGNOSIS — N18.30 CKD STAGE 3 DUE TO TYPE 2 DIABETES MELLITUS (HCC): ICD-10-CM

## 2020-09-16 DIAGNOSIS — F32.A ANXIETY AND DEPRESSION: ICD-10-CM

## 2020-09-16 DIAGNOSIS — Z86.718 HISTORY OF DEEP VENOUS THROMBOSIS (DVT) OF DISTAL VEIN OF LEFT LOWER EXTREMITY: ICD-10-CM

## 2020-09-16 DIAGNOSIS — I10 ESSENTIAL HYPERTENSION: ICD-10-CM

## 2020-09-16 LAB
ABO GROUP BLD: NORMAL
BASOPHILS # BLD AUTO: 0.21 10*3/MM3 (ref 0–0.2)
BASOPHILS NFR BLD AUTO: 3.6 % (ref 0–1.5)
BLD GP AB SCN SERPL QL: NEGATIVE
DEPRECATED RDW RBC AUTO: 70.7 FL (ref 37–54)
EOSINOPHIL # BLD AUTO: 0.39 10*3/MM3 (ref 0–0.4)
EOSINOPHIL NFR BLD AUTO: 6.6 % (ref 0.3–6.2)
ERYTHROCYTE [DISTWIDTH] IN BLOOD BY AUTOMATED COUNT: 20.2 % (ref 12.3–15.4)
FERRITIN SERPL-MCNC: 1292 NG/ML (ref 13–150)
HCT VFR BLD AUTO: 23.5 % (ref 34–46.6)
HGB BLD-MCNC: 7.2 G/DL (ref 12–15.9)
IMM GRANULOCYTES # BLD AUTO: 0.07 10*3/MM3 (ref 0–0.05)
IMM GRANULOCYTES NFR BLD AUTO: 1.2 % (ref 0–0.5)
IRON 24H UR-MRATE: 172 MCG/DL (ref 37–145)
IRON SATN MFR SERPL: ABNORMAL %
LYMPHOCYTES # BLD AUTO: 2.05 10*3/MM3 (ref 0.7–3.1)
LYMPHOCYTES NFR BLD AUTO: 34.9 % (ref 19.6–45.3)
MCH RBC QN AUTO: 30.6 PG (ref 26.6–33)
MCHC RBC AUTO-ENTMCNC: 30.6 G/DL (ref 31.5–35.7)
MCV RBC AUTO: 100 FL (ref 79–97)
MONOCYTES # BLD AUTO: 0.24 10*3/MM3 (ref 0.1–0.9)
MONOCYTES NFR BLD AUTO: 4.1 % (ref 5–12)
NEUTROPHILS NFR BLD AUTO: 2.91 10*3/MM3 (ref 1.7–7)
NEUTROPHILS NFR BLD AUTO: 49.6 % (ref 42.7–76)
PLATELET # BLD AUTO: 366 10*3/MM3 (ref 140–450)
PMV BLD AUTO: 11.6 FL (ref 6–12)
RBC # BLD AUTO: 2.35 10*6/MM3 (ref 3.77–5.28)
RH BLD: POSITIVE
T&S EXPIRATION DATE: NORMAL
TIBC SERPL-MCNC: ABNORMAL UG/DL
UIBC SERPL-MCNC: <16 MCG/DL (ref 112–346)
WBC # BLD AUTO: 5.87 10*3/MM3 (ref 3.4–10.8)

## 2020-09-16 PROCEDURE — 86923 COMPATIBILITY TEST ELECTRIC: CPT

## 2020-09-16 PROCEDURE — 96372 THER/PROPH/DIAG INJ SC/IM: CPT

## 2020-09-16 PROCEDURE — 86901 BLOOD TYPING SEROLOGIC RH(D): CPT | Performed by: INTERNAL MEDICINE

## 2020-09-16 PROCEDURE — 83550 IRON BINDING TEST: CPT | Performed by: INTERNAL MEDICINE

## 2020-09-16 PROCEDURE — 83883 ASSAY NEPHELOMETRY NOT SPEC: CPT | Performed by: INTERNAL MEDICINE

## 2020-09-16 PROCEDURE — 99215 OFFICE O/P EST HI 40 MIN: CPT | Performed by: INTERNAL MEDICINE

## 2020-09-16 PROCEDURE — 86900 BLOOD TYPING SEROLOGIC ABO: CPT | Performed by: INTERNAL MEDICINE

## 2020-09-16 PROCEDURE — 90694 VACC AIIV4 NO PRSRV 0.5ML IM: CPT | Performed by: INTERNAL MEDICINE

## 2020-09-16 PROCEDURE — 82784 ASSAY IGA/IGD/IGG/IGM EACH: CPT | Performed by: INTERNAL MEDICINE

## 2020-09-16 PROCEDURE — 82728 ASSAY OF FERRITIN: CPT | Performed by: INTERNAL MEDICINE

## 2020-09-16 PROCEDURE — 85025 COMPLETE CBC W/AUTO DIFF WBC: CPT | Performed by: INTERNAL MEDICINE

## 2020-09-16 PROCEDURE — 84155 ASSAY OF PROTEIN SERUM: CPT | Performed by: INTERNAL MEDICINE

## 2020-09-16 PROCEDURE — G0008 ADMIN INFLUENZA VIRUS VAC: HCPCS | Performed by: INTERNAL MEDICINE

## 2020-09-16 PROCEDURE — 86850 RBC ANTIBODY SCREEN: CPT | Performed by: INTERNAL MEDICINE

## 2020-09-16 PROCEDURE — 83540 ASSAY OF IRON: CPT | Performed by: INTERNAL MEDICINE

## 2020-09-16 PROCEDURE — 84165 PROTEIN E-PHORESIS SERUM: CPT | Performed by: INTERNAL MEDICINE

## 2020-09-16 PROCEDURE — 25010000002 EPOETIN ALFA PER 1000 UNITS: Performed by: INTERNAL MEDICINE

## 2020-09-16 PROCEDURE — 99214 OFFICE O/P EST MOD 30 MIN: CPT | Performed by: INTERNAL MEDICINE

## 2020-09-16 PROCEDURE — 36415 COLL VENOUS BLD VENIPUNCTURE: CPT

## 2020-09-16 PROCEDURE — 86334 IMMUNOFIX E-PHORESIS SERUM: CPT | Performed by: INTERNAL MEDICINE

## 2020-09-16 RX ORDER — HYDROCODONE BITARTRATE AND ACETAMINOPHEN 5; 325 MG/1; MG/1
1 TABLET ORAL EVERY 12 HOURS PRN
Qty: 30 TABLET | Refills: 0 | Status: SHIPPED | OUTPATIENT
Start: 2020-09-16 | End: 2020-11-18 | Stop reason: SDUPTHER

## 2020-09-16 RX ORDER — SODIUM CHLORIDE 9 MG/ML
250 INJECTION, SOLUTION INTRAVENOUS AS NEEDED
Status: CANCELLED | OUTPATIENT
Start: 2020-09-16

## 2020-09-16 RX ORDER — FUROSEMIDE 10 MG/ML
20 INJECTION INTRAMUSCULAR; INTRAVENOUS ONCE
Status: CANCELLED | OUTPATIENT
Start: 2020-09-16 | End: 2020-09-16

## 2020-09-16 RX ADMIN — ERYTHROPOIETIN 60000 UNITS: 40000 INJECTION, SOLUTION INTRAVENOUS; SUBCUTANEOUS at 10:02

## 2020-09-16 NOTE — PROGRESS NOTES
Joya Singh is a 80 y.o. female, who presents with a chief complaint of   Chief Complaint   Patient presents with   • Follow-up   • Diabetes       HPI   Pt here for f/ufollow up.     Orthostatic hypotension -   bp has been better controlled.  She is on midodrine for her orthostatic hypotension. Sx get some better after transfusion but then she is feeling worse sooner than before.      Her left shoulder is worsening and she saw ortho last week.  She is very high risk for surgery given all of her co-morbidities.  She had her shoulder injected last week.  She then tells me she fell a couple of months ago and dislocated her shoulder. She says she forgot to tell ortho about this.  She wants more pain meds for this. She was given norco 5/325 #10 on 9/11 and has already taken all of these pills.      Myelodysplastic syndrome - she has been requiring more frequent blood transfusions.  she saw dr. Headley earlier today and her hgb was 7.2 in his office.  They are currently transfusing for hgb 8 or less.  She is going to do a trial of Revlimid to see if this improves her anemia 2/2 the MDS. She also has some transfusion iron overload and heme/onc thinks she may benefit from iron chelation therapy.    CKD 3 - stable but pt does have anemia 2/2 to her ckd as well.  On weekly procrit.     Recurrent UTI - pt with recent UTI sx and she says dr. Fall has called in meds for her.       Home health is helping follow the pt at home.  She was uncomfortable with the therapist and asked him not to come back.  she would like  to send someone to help with showers/bathing.     DM - glucoses currently more stable.  Glucose up this am but she had a peppermiint.  She says she has been better about eating consistently.  She is currently taking tresiba 54 units daily.     The following portions of the patient's history were reviewed and updated as appropriate: allergies, current medications, past family history, past medical history, past  social history, past surgical history and problem list.    Allergies: Baclofen, Codeine, Lisinopril, Morphine, and Penicillins    Review of Systems   Constitutional: Positive for fatigue.   HENT: Negative.    Eyes: Negative.    Respiratory: Negative.    Cardiovascular: Negative.    Gastrointestinal: Negative.    Endocrine: Negative.    Genitourinary: Negative.    Musculoskeletal: Negative.         Shoulder pain   Skin: Negative.    Allergic/Immunologic: Negative.    Neurological: Negative.    Hematological: Negative.    Psychiatric/Behavioral: Negative.    All other systems reviewed and are negative.            Wt Readings from Last 3 Encounters:   09/16/20 89.8 kg (198 lb)   09/16/20 89.8 kg (198 lb)   08/12/20 89.8 kg (198 lb)     Temp Readings from Last 3 Encounters:   09/16/20 98.7 °F (37.1 °C) (Temporal)   09/16/20 98 °F (36.7 °C) (Tympanic)   09/09/20 98.5 °F (36.9 °C)     BP Readings from Last 3 Encounters:   09/16/20 116/78   09/16/20 148/74   09/09/20 153/71     Pulse Readings from Last 3 Encounters:   09/16/20 70   09/16/20 98   09/09/20 86     Body mass index is 37.44 kg/m².  @LASTSAO2(3)@    Physical Exam  Vitals signs and nursing note reviewed.   Constitutional:       General: She is not in acute distress.     Appearance: She is well-developed. She is obese.      Comments: Appears chronically ill   HENT:      Head: Normocephalic and atraumatic.      Right Ear: External ear normal.      Left Ear: External ear normal.      Nose: Nose normal.   Eyes:      Conjunctiva/sclera: Conjunctivae normal.      Pupils: Pupils are equal, round, and reactive to light.   Neck:      Musculoskeletal: Normal range of motion and neck supple.   Cardiovascular:      Rate and Rhythm: Normal rate and regular rhythm.      Heart sounds: Normal heart sounds.   Pulmonary:      Effort: Pulmonary effort is normal. No respiratory distress.      Breath sounds: Normal breath sounds. No wheezing.   Musculoskeletal: Normal range of  motion.      Comments: In wheelchair   Skin:     General: Skin is warm and dry.   Neurological:      Mental Status: She is alert and oriented to person, place, and time.   Psychiatric:         Behavior: Behavior normal.         Thought Content: Thought content normal.         Judgment: Judgment normal.         Results for orders placed or performed in visit on 09/16/20   Ferritin    Specimen: Blood   Result Value Ref Range    Ferritin 1,292.00 (H) 13.00 - 150.00 ng/mL   Iron Profile    Specimen: Blood   Result Value Ref Range    Iron 172 (H) 37 - 145 mcg/dL    Iron Saturation      UIBC <16 (L) 112 - 346 mcg/dL    TIBC     CBC Auto Differential    Specimen: Blood   Result Value Ref Range    WBC 5.87 3.40 - 10.80 10*3/mm3    RBC 2.35 (L) 3.77 - 5.28 10*6/mm3    Hemoglobin 7.2 (L) 12.0 - 15.9 g/dL    Hematocrit 23.5 (L) 34.0 - 46.6 %    .0 (H) 79.0 - 97.0 fL    MCH 30.6 26.6 - 33.0 pg    MCHC 30.6 (L) 31.5 - 35.7 g/dL    RDW 20.2 (H) 12.3 - 15.4 %    RDW-SD 70.7 (H) 37.0 - 54.0 fl    MPV 11.6 6.0 - 12.0 fL    Platelets 366 140 - 450 10*3/mm3    Neutrophil % 49.6 42.7 - 76.0 %    Lymphocyte % 34.9 19.6 - 45.3 %    Monocyte % 4.1 (L) 5.0 - 12.0 %    Eosinophil % 6.6 (H) 0.3 - 6.2 %    Basophil % 3.6 (H) 0.0 - 1.5 %    Immature Grans % 1.2 (H) 0.0 - 0.5 %    Neutrophils, Absolute 2.91 1.70 - 7.00 10*3/mm3    Lymphocytes, Absolute 2.05 0.70 - 3.10 10*3/mm3    Monocytes, Absolute 0.24 0.10 - 0.90 10*3/mm3    Eosinophils, Absolute 0.39 0.00 - 0.40 10*3/mm3    Basophils, Absolute 0.21 (H) 0.00 - 0.20 10*3/mm3    Immature Grans, Absolute 0.07 (H) 0.00 - 0.05 10*3/mm3   Type and screen    Specimen: Blood   Result Value Ref Range    ABO Type B     RH type Positive     Antibody Screen Negative     T&S Expiration Date 9/30/2020 11:59:00 PM      *Note: Due to a large number of results and/or encounters for the requested time period, some results have not been displayed. A complete set of results can be found in Results  Review.           Joya was seen today for follow-up and diabetes.    Diagnoses and all orders for this visit:    Uncontrolled type 2 diabetes mellitus with hyperglycemia (CMS/HCC)    Need for influenza vaccination  -     Fluad Quad >65 years    Chronic right shoulder pain  -     HYDROcodone-acetaminophen (Norco) 5-325 MG per tablet; Take 1 tablet by mouth Every 12 (Twelve) Hours As Needed for Severe Pain .    Myelodysplastic syndrome with 5q deletion (CMS/HCC)    Anxiety and depression    Anemia requiring transfusions    History of deep venous thrombosis (DVT) of distal vein of left lower extremity    Monoclonal gammopathy of unknown significance (MGUS)    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)    Iron overload, transfusional    Chronic diastolic CHF (congestive heart failure) (CMS/HCC)    Essential hypertension    Mixed hyperlipidemia    pt to cont f/u with heme/onc and orthopedics.    Continue current medications.  Encouraged healthy diet/exercise.  OV in 1 month.  Pt to call sooner if any other issues arise.        Outpatient Medications Prior to Visit   Medication Sig Dispense Refill   • ACCU-CHEK FASTCLIX LANCETS misc TEST 3-4 TIMES DAILY AS DIRECTED 400 each 3   • ACCU-CHEK SMARTVIEW test strip TEST BLOOD SUGAR THREE TIMES DAILY OR AS DIRECTED 300 each 3   • acetaminophen (TYLENOL) 325 MG tablet Take 2 tablets by mouth Every 6 (Six) Hours As Needed for Mild Pain . OTC product 40 tablet 0   • albuterol sulfate  (90 Base) MCG/ACT inhaler Inhale 2 puffs Every 4 (Four) Hours As Needed for Wheezing. 1 inhaler 3   • atorvastatin (LIPITOR) 10 MG tablet TAKE ONE TABLET BY MOUTH AT BEDTIME 90 tablet 1   • Blood Glucose Monitoring Suppl (ACCU-CHEK KENNETH SMARTVIEW) w/Device kit TEST blood sugar three times daily or as directed 1 kit 0   • desvenlafaxine (PRISTIQ) 50 MG 24 hr tablet TAKE ONE TABLET BY MOUTH EVERY DAY 90 tablet 1   • diphenhydrAMINE (BENADRYL) 25 mg capsule Take 25 mg by mouth Every 6  "(Six) Hours As Needed for Itching.     • ELIQUIS 5 MG tablet tablet TAKE ONE TABLET BY MOUTH TWICE DAILY 180 tablet 0   • fluconazole (DIFLUCAN) 150 MG tablet TAKE ONE TABLET BY MOUTH ONCE FOR ONE DOSE     • furosemide (LASIX) 20 MG tablet Take 20 mg by mouth Daily.     • gabapentin (NEURONTIN) 400 MG capsule TAKE ONE CAPSULE BY MOUTH EVERY MORNING, AT NOON, AND TWO AT BEDTIME 120 capsule 2   • insulin aspart (novoLOG) 100 UNIT/ML injection Inject 5 Units under the skin into the appropriate area as directed 3 (Three) Times a Day With Meals.  12   • levothyroxine (SYNTHROID, LEVOTHROID) 88 MCG tablet TAKE ONE TABLET BY MOUTH EVERY DAY 90 tablet 1   • midodrine (PROAMATINE) 5 MG tablet TAKE ONE TABLET BY MOUTH THREE TIMES DAILY BEFORE MEALS 90 tablet 0   • Mirabegron ER (Myrbetriq) 25 MG tablet sustained-release 24 hour 24 hr tablet Take 25 mg by mouth Daily.     • Needle, Disp, (BD DISP NEEDLES) 30G X 1/2\" misc To be used 3 times daily with Novolog Flexpen. 100 each 5   • nitrofurantoin, macrocrystal-monohydrate, (MACROBID) 100 MG capsule Take 100 mg by mouth 2 (Two) Times a Day.     • nystatin (MYCOSTATIN) 305998 UNIT/GM powder Apply  topically to the appropriate area as directed 2 (Two) Times a Day. 60 g 2   • O2 (OXYGEN) Inhale 2 L/min Every Night. Uses 2L  overnight only     • omeprazole (priLOSEC) 40 MG capsule TAKE ONE CAPSULE BY MOUTH EVERY DAY 90 capsule 1   • opium-belladonna (B&O SUPPRETTES) 16.2-30 MG suppository Insert 1 suppository into the rectum Every 8 (Eight) Hours As Needed for Bladder Spasms. 4 suppository 0   • phenazopyridine (PYRIDIUM) 200 MG tablet Take 200 mg by mouth 3 (Three) Times a Day.     • potassium chloride (K-DUR) 10 MEQ CR tablet Take 10 mEq by mouth Daily.     • TRESIBA FLEXTOUCH 200 UNIT/ML solution pen-injector pen injection INJECT 54 UNITS subcutaneously AT BEDTIME 9 mL 11   • ULTICARE MINI PEN NEEDLES 31G X 6 MM misc USE TO INJECT INSULINS 4 TIMES DAILY AS DIRECTED 400 each 3 "   • HYDROcodone-acetaminophen (Norco) 5-325 MG per tablet Take 1 tablet by mouth Every 6 (Six) Hours As Needed for Severe Pain . 10 tablet 0     Facility-Administered Medications Prior to Visit   Medication Dose Route Frequency Provider Last Rate Last Dose   • epoetin chu (EPOGEN,PROCRIT) 60,000 Units 2 mL injection  60,000 Units Subcutaneous Weekly Elieser Headley MD   60,000 Units at 09/16/20 1002     New Medications Ordered This Visit   Medications   • HYDROcodone-acetaminophen (Norco) 5-325 MG per tablet     Sig: Take 1 tablet by mouth Every 12 (Twelve) Hours As Needed for Severe Pain .     Dispense:  30 tablet     Refill:  0     [unfilled]  Medications Discontinued During This Encounter   Medication Reason   • HYDROcodone-acetaminophen (Norco) 5-325 MG per tablet Reorder         Return in about 1 month (around 10/16/2020) for Recheck.

## 2020-09-16 NOTE — PROGRESS NOTES
Subjective     REASON FOR FOLLOW-UP:   1.  Macrocytic anemia secondary to myelodysplastic syndrome with 5 q. minus and chronic kidney disease  2.  Monoclonal gammopathy of undetermined significance                             REQUESTING PHYSICIAN:  Dr. Baugh      History of Present Illness   Ms. Singh is a nikita 80-year-old woman returning today for follow-up of her myelodysplastic syndrome with anemia.  She comes in today feeling fatigued and short of breath with exertion.  She states she is physically tired.  She has having recurrent urinary tract infections and scheduled to see urology.    The patient continues to have progression of anemia requiring transfusion every 3 to 4 weeks for symptomatic anemia despite Procrit therapy.    Hematology History:  Patient presented as 78-year-old woman for evaluation of anemia.  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  She has stage III chronic kidney disease followed by Dr. Garza of nephrology.  Reviewing her records, the patient has had anemia present since at least 2014 but her hemoglobin has worsened over the past 6 months.  The patient was admitted to the hospital in October 2018 with symptomatic anemia, shortness of breath and lightheadedness.  She was found to have hemoglobin of 7.0.  There was concern for GI blood loss although EGD and colonoscopy performed showed no obvious etiology of blood loss.  The patient states that she was transfused 7 units of packed red blood cells during the hospital stay.  I do not see any Hemoccult results.  She was previously on Eliquis which was discontinued.  Since discharge in October, the patient has been receiving weekly Procrit 20,000 units in the ACU at [facility], but despite Procrit she has required transfusion on 2 separate occasions.  She is not seeing any bright red blood per rectum or melena.  She complains of fatigue and  lightheadedness.    Recent iron profile on 1/17/19 was inconsistent with iron deficiency with an iron saturation 68% and the ferritin was 352.  The red blood cells are macrocytic.  White blood cell and platelet counts have been normal.    The patient was seen in hematology on 1/29/19 and additional evaluation performed.  The reticulocyte count was elevated 3.72% but the haptoglobin and LDH were both normal arguing against a hemolytic process.  B12 and folic acid levels were normal.  Serum protein electrophoresis showed no M spike but the immunofixation identified a small IgA monoclonal protein with lambda specificity; IgA level 544.  Sedimentation rate elevated 58.  A free light chain ratio from the serum was normal 1.64.    The patient was referred for a bone marrow exam performed on 3/6/2019 which showed a cellularity of 35%.  There was erythroid hyperplasia with megaloblastoid changes, normal myeloid maturation, increased megakaryocytes with frequent micro megakaryocytes, scattered lymphoid aggregates.  There were morphologically normal plasma cells increased in #2 8% of the cellularity.  There were no increased blasts.  No increase in iron stores.  Karyotype showed a deletion 5 q. and 19 of 20 cells analyzed.        Past Medical History:   Diagnosis Date   • Allergic rhinitis    • Anemia    • Anxiety    • Appetite absent    • Arthritis    • Asthma    • Back pain    • Bell's palsy    • Black tarry stools    • Blood in stool    • Chronic fatigue    • CKD (chronic kidney disease) stage 3, GFR 30-59 ml/min (CMS/[facility])    • Community acquired pneumonia of left lung 10/25/2018   • Cough    • Depression    • Diabetes mellitus (CMS/[facility])     LAST A1C 6   • Diabetic gastroparesis (CMS/[facility]) 2/19/2016   • Difficulty walking    • Excessive urination at night    • Frequent urination    • GERD (gastroesophageal reflux disease)    • GI bleed    • Gout    • H/O blood clots     LEFT LEG 7 OR 8 YEARS AGO   • Heat intolerance    •  History of fall 10/2018   • History of prior pregnancies     x8, miscarriage 5   • History of transfusion 11/2018    due to anemia   • Hyperlipidemia    • Hypertension    • Hypothyroidism    • Normal coronary arteries     by cath 2013   • Orthostatic hypotension    • AARON (obstructive sleep apnea)     DOESNT WEAR REGULARLY   • PONV (postoperative nausea and vomiting)    • Skin cancer    • Stroke (CMS/HCC)     Several mini-strokes   • TIA (transient ischemic attack)     LAST TIA JULY 2017   • Urination pain    • UTI (urinary tract infection)     Dec 2018 and Jan 2019        Past Surgical History:   Procedure Laterality Date   • BACK SURGERY      HARDWARE   • CHOLECYSTECTOMY      OPEN   • COLONOSCOPY  2011    due for repeat in 2021   • COLONOSCOPY N/A 10/28/2018    Procedure: COLONOSCOPY;  Surgeon: Emmanuel Rogers MD;  Location:  [facility] OR;  Service: Gastroenterology   • ENDOSCOPY N/A 10/26/2018    Procedure: ESOPHAGOGASTRODUODENOSCOPY;  Surgeon: Emmanuel Rogers MD;  Location: [facility];  Service: Gastroenterology   • HYSTERECTOMY      PARTIAL    • KNEE SURGERY     • NECK SURGERY     • SPINE SURGERY     • TUMOR REMOVAL Left     Leg   • UPPER GASTROINTESTINAL ENDOSCOPY  2014    gastritis.  done by dr. hastings        Current Outpatient Medications on File Prior to Visit   Medication Sig Dispense Refill   • ACCU-CHEK FASTCLIX LANCETS misc TEST 3-4 TIMES DAILY AS DIRECTED 400 each 3   • ACCU-CHEK SMARTVIEW test strip TEST BLOOD SUGAR THREE TIMES DAILY OR AS DIRECTED 300 each 3   • acetaminophen (TYLENOL) 325 MG tablet Take 2 tablets by mouth Every 6 (Six) Hours As Needed for Mild Pain . OTC product 40 tablet 0   • albuterol sulfate  (90 Base) MCG/ACT inhaler Inhale 2 puffs Every 4 (Four) Hours As Needed for Wheezing. 1 inhaler 3   • atorvastatin (LIPITOR) 10 MG tablet TAKE ONE TABLET BY MOUTH AT BEDTIME 90 tablet 1   • Blood Glucose Monitoring Suppl (ACCU-CHEK KENNETH SMARTVIEW) w/Device kit TEST  "blood sugar three times daily or as directed 1 kit 0   • desvenlafaxine (PRISTIQ) 50 MG 24 hr tablet TAKE ONE TABLET BY MOUTH EVERY DAY 90 tablet 1   • diphenhydrAMINE (BENADRYL) 25 mg capsule Take 25 mg by mouth Every 6 (Six) Hours As Needed for Itching.     • ELIQUIS 5 MG tablet tablet TAKE ONE TABLET BY MOUTH TWICE DAILY 180 tablet 0   • fluconazole (DIFLUCAN) 150 MG tablet TAKE ONE TABLET BY MOUTH ONCE FOR ONE DOSE     • furosemide (LASIX) 20 MG tablet Take 20 mg by mouth Daily.     • gabapentin (NEURONTIN) 400 MG capsule TAKE ONE CAPSULE BY MOUTH EVERY MORNING, AT NOON, AND TWO AT BEDTIME 120 capsule 2   • HYDROcodone-acetaminophen (Norco) 5-325 MG per tablet Take 1 tablet by mouth Every 6 (Six) Hours As Needed for Severe Pain . 10 tablet 0   • insulin aspart (novoLOG) 100 UNIT/ML injection Inject 5 Units under the skin into the appropriate area as directed 3 (Three) Times a Day With Meals.  12   • levothyroxine (SYNTHROID, LEVOTHROID) 88 MCG tablet TAKE ONE TABLET BY MOUTH EVERY DAY 90 tablet 1   • midodrine (PROAMATINE) 5 MG tablet TAKE ONE TABLET BY MOUTH THREE TIMES DAILY BEFORE MEALS 90 tablet 0   • Mirabegron ER (Myrbetriq) 25 MG tablet sustained-release 24 hour 24 hr tablet Take 25 mg by mouth Daily.     • Needle, Disp, (BD DISP NEEDLES) 30G X 1/2\" misc To be used 3 times daily with Novolog Flexpen. 100 each 5   • nitrofurantoin, macrocrystal-monohydrate, (MACROBID) 100 MG capsule Take 100 mg by mouth 2 (Two) Times a Day.     • nystatin (MYCOSTATIN) 488431 UNIT/GM powder Apply  topically to the appropriate area as directed 2 (Two) Times a Day. 60 g 2   • O2 (OXYGEN) Inhale 2 L/min Every Night. Uses 2L  overnight only     • omeprazole (priLOSEC) 40 MG capsule TAKE ONE CAPSULE BY MOUTH EVERY DAY 90 capsule 1   • opium-belladonna (B&O SUPPRETTES) 16.2-30 MG suppository Insert 1 suppository into the rectum Every 8 (Eight) Hours As Needed for Bladder Spasms. 4 suppository 0   • phenazopyridine (PYRIDIUM) 200 " MG tablet Take 200 mg by mouth 3 (Three) Times a Day.     • potassium chloride (K-DUR) 10 MEQ CR tablet Take 10 mEq by mouth Daily.     • TRESIBA FLEXTOUCH 200 UNIT/ML solution pen-injector pen injection INJECT 54 UNITS subcutaneously AT BEDTIME 9 mL 11   • ULTICARE MINI PEN NEEDLES 31G X 6 MM misc USE TO INJECT INSULINS 4 TIMES DAILY AS DIRECTED 400 each 3     No current facility-administered medications on file prior to visit.         ALLERGIES:    Allergies   Allergen Reactions   • Baclofen Anxiety     Panic attack, nightmares   • Codeine Itching and Rash   • Lisinopril Cough   • Morphine Hives   • Penicillins Rash     Tolerates cephalosporins         Social History     Socioeconomic History   • Marital status:      Spouse name: Not on file   • Number of children: 3   • Years of education: High School   • Highest education level: Not on file   Occupational History     Employer: RETIRED   Tobacco Use   • Smoking status: Never Smoker   • Smokeless tobacco: Never Used   • Tobacco comment: CAFFEINE USE: NONE   Substance and Sexual Activity   • Alcohol use: No   • Drug use: No   • Sexual activity: Defer     Comment: EXERCISE - RARELY        Family History   Problem Relation Age of Onset   • Lupus Mother    • Heart failure Mother 59   • Heart disease Other    • Hypertension Other    • Heart attack Father    • Breast cancer Neg Hx         Review of Systems   Constitutional: Positive for activity change and fatigue. Negative for appetite change, fever and unexpected weight change.   HENT: Negative for congestion.    Respiratory: Positive for shortness of breath. Negative for apnea and cough.    Gastrointestinal: Negative for abdominal pain, blood in stool, nausea and vomiting.   Genitourinary: Positive for dysuria. Negative for frequency and urgency.   Musculoskeletal: Positive for arthralgias, back pain and gait problem. Negative for neck stiffness.   Skin: Negative.    Neurological: Positive for numbness.  "Negative for dizziness, tremors, speech difficulty and light-headedness.   Hematological: Negative.    Psychiatric/Behavioral: Negative.        Objective     Vitals:    09/16/20 0926   BP: 148/74   Pulse: 98   Resp: 14   Temp: 98 °F (36.7 °C)   TempSrc: Tympanic   SpO2: 99%   Weight: 89.8 kg (198 lb)   Height: 154.9 cm (60.98\")   PainSc: 10-Worst pain ever   PainLoc: Shoulder     Current Status 9/16/2020   ECOG score 1       Physical Exam    CON: pleasant well-developed somewhat chronic ill appearing woman  HEENT: no icterus, no thrush, moist membranes  NECK: no jvd  LYMPH: No cervical or supraclavicular lymphadenopathy  CV: RRR, S1S2, no murmur  RESP: Lungs clear to auscultation bilaterally, no wheezing noted.  MUSC: No edema noted.  Poor mobility, and a wheelchair  NEURO: alert and oriented x3, mild global weakness  PSYCH: normal mood and affect  Exam unchanged- 9/16/2020    RECENT LABS:  Hematology WBC   Date Value Ref Range Status   09/16/2020 5.87 3.40 - 10.80 10*3/mm3 Final   04/23/2019 5.14 3.40 - 10.80 10*3/mm3 Final     RBC   Date Value Ref Range Status   09/16/2020 2.35 (L) 3.77 - 5.28 10*6/mm3 Final   04/23/2019 3.02 (L) 3.77 - 5.28 10*6/mm3 Final     Hemoglobin   Date Value Ref Range Status   09/16/2020 7.2 (L) 12.0 - 15.9 g/dL Final     Hematocrit   Date Value Ref Range Status   09/16/2020 23.5 (L) 34.0 - 46.6 % Final     Platelets   Date Value Ref Range Status   09/16/2020 366 140 - 450 10*3/mm3 Final      M spike/RANJANA asymmetrical gamma IgA lambda; repeat pending today  Free light chain ratio 1.7; repeat pending today  Ferritin 1292, iron saturation unable to calculate    Assessment/Plan     1.  Macrocytic anemia, worse:  BM biopsy 3/5/19 c/w myelodysplastic syndrome with deletion of 5 q.  In addition, the patient has chronic kidney disease, stage III.  She is currently doing okay on weekly Procrit with transfusion support required more frequently every 3 to 4 weeks hemoglobin is worse today 7.2 with " increasing symptoms of anemia.  We will transfuse 2 units of packed red blood cells this week.      For now we will continue weekly CBC and Procrit.  She requires transfusion for hemoglobin 8.0 or less and receives Lasix between units of blood.  Since she has 5 q1 disease I discussed with the patient a trial of Revlimid to see if this might improve her hemoglobin related to MDS.  I discussed with the patient risk of myelosuppression, nausea, diarrhea, skin rash, DVT etc.  She is already on anticoagulation.  The patient was agreeable to proceed.  We will order Revlimid 2.5 mg daily adjusted for her renal insufficiency.  We will continue to check her CBC weekly with Procrit and transfusion support.  She will need a CMP in about 4 weeks.    2.  Monoclonal gammopathy of undetermined significance: The patient's bone marrow showed slight increase in plasma cells 8% of the cellularity but normal in morphology.  She has an IgA monoclonality on her immunofixation but no measurable M spike and a normal free light chain ratio.   Repeat studies 5/16/2019 showed a stable IgA 509 with a normal light chain ratio, no M spike.  Studies performed 10/24/2019 show stable IgA at 491, no M spike with mildly increased kappa/lambda light chain ratio related to her CKD.  Repeat studies 4/8/2020 were stable.  I will repeat SPEP, RANJANA and a free light chain ratio today-results pending      3.  Chronic kidney disease, stage III which contributes to anemia    4.  Probable transfusional iron overload.  The patient has poor performance status but low-grade MDS and may benefit from iron chelation but complicated by her CKD.  Ferritin and iron profile today show a ferritin 1292 and iron saturation unable to be detected.    The patient has been prescribed medication requiring intensive monitoring for toxicity today.

## 2020-09-17 ENCOUNTER — HOSPITAL ENCOUNTER (OUTPATIENT)
Dept: INFUSION THERAPY | Facility: HOSPITAL | Age: 80
Discharge: HOME | End: 2020-09-17
Admitting: INTERNAL MEDICINE

## 2020-09-17 ENCOUNTER — DOCUMENTATION (OUTPATIENT)
Dept: ONCOLOGY | Facility: CLINIC | Age: 80
End: 2020-09-17

## 2020-09-17 VITALS
DIASTOLIC BLOOD PRESSURE: 73 MMHG | OXYGEN SATURATION: 93 % | HEIGHT: 61 IN | BODY MASS INDEX: 37 KG/M2 | WEIGHT: 196 LBS | SYSTOLIC BLOOD PRESSURE: 160 MMHG | HEART RATE: 86 BPM | TEMPERATURE: 97.4 F | RESPIRATION RATE: 20 BRPM

## 2020-09-17 DIAGNOSIS — D46.C MYELODYSPLASTIC SYNDROME WITH 5Q DELETION (HCC): ICD-10-CM

## 2020-09-17 LAB
ALBUMIN SERPL ELPH-MCNC: 3.5 G/DL (ref 2.9–4.4)
ALBUMIN/GLOB SERPL: 1.2 {RATIO} (ref 0.7–1.7)
ALPHA1 GLOB SERPL ELPH-MCNC: 0.3 G/DL (ref 0–0.4)
ALPHA2 GLOB SERPL ELPH-MCNC: 0.7 G/DL (ref 0.4–1)
B-GLOBULIN SERPL ELPH-MCNC: 0.8 G/DL (ref 0.7–1.3)
GAMMA GLOB SERPL ELPH-MCNC: 1.2 G/DL (ref 0.4–1.8)
GLOBULIN SER CALC-MCNC: 2.9 G/DL (ref 2.2–3.9)
IGA SERPL-MCNC: 407 MG/DL (ref 64–422)
IGG SERPL-MCNC: 1052 MG/DL (ref 586–1602)
IGM SERPL-MCNC: 201 MG/DL (ref 26–217)
KAPPA LC FREE SER-MCNC: 82.2 MG/L (ref 3.3–19.4)
KAPPA LC FREE/LAMBDA FREE SER: 2.06 {RATIO} (ref 0.26–1.65)
LABORATORY COMMENT REPORT: NORMAL
LAMBDA LC FREE SERPL-MCNC: 40 MG/L (ref 5.7–26.3)
M PROTEIN SERPL ELPH-MCNC: NORMAL G/DL
PROT PATTERN SERPL IFE-IMP: NORMAL
PROT SERPL-MCNC: 6.4 G/DL (ref 6–8.5)

## 2020-09-17 PROCEDURE — 36430 TRANSFUSION BLD/BLD COMPNT: CPT

## 2020-09-17 PROCEDURE — 63710000001 DIPHENHYDRAMINE PER 50 MG: Performed by: INTERNAL MEDICINE

## 2020-09-17 PROCEDURE — P9016 RBC LEUKOCYTES REDUCED: HCPCS

## 2020-09-17 PROCEDURE — 63710000001 ACETAMINOPHEN 325 MG TABLET: Performed by: INTERNAL MEDICINE

## 2020-09-17 PROCEDURE — A9270 NON-COVERED ITEM OR SERVICE: HCPCS | Performed by: INTERNAL MEDICINE

## 2020-09-17 PROCEDURE — 96374 THER/PROPH/DIAG INJ IV PUSH: CPT

## 2020-09-17 PROCEDURE — 86900 BLOOD TYPING SEROLOGIC ABO: CPT

## 2020-09-17 PROCEDURE — 25010000002 FUROSEMIDE PER 20 MG: Performed by: INTERNAL MEDICINE

## 2020-09-17 RX ORDER — ACETAMINOPHEN 325 MG/1
650 TABLET ORAL ONCE
Status: COMPLETED | OUTPATIENT
Start: 2020-09-17 | End: 2020-09-17

## 2020-09-17 RX ORDER — DIPHENHYDRAMINE HCL 25 MG
25 CAPSULE ORAL ONCE
Status: COMPLETED | OUTPATIENT
Start: 2020-09-17 | End: 2020-09-17

## 2020-09-17 RX ORDER — ACETAMINOPHEN 325 MG/1
650 TABLET ORAL EVERY 6 HOURS PRN
Status: ACTIVE | OUTPATIENT
Start: 2020-09-17

## 2020-09-17 RX ORDER — DIPHENHYDRAMINE HCL 25 MG
25 CAPSULE ORAL ONCE
Status: ACTIVE | OUTPATIENT
Start: 2020-09-17

## 2020-09-17 RX ORDER — LENALIDOMIDE 2.5 MG/1
2.5 CAPSULE ORAL DAILY
Qty: 28 CAPSULE | Refills: 0 | Status: SHIPPED | OUTPATIENT
Start: 2020-09-17 | End: 2020-10-15

## 2020-09-17 RX ORDER — FUROSEMIDE 10 MG/ML
20 INJECTION INTRAMUSCULAR; INTRAVENOUS ONCE
Status: COMPLETED | OUTPATIENT
Start: 2020-09-17 | End: 2020-09-17

## 2020-09-17 RX ORDER — SODIUM CHLORIDE 9 MG/ML
250 INJECTION, SOLUTION INTRAVENOUS AS NEEDED
Status: DISCONTINUED | OUTPATIENT
Start: 2020-09-17 | End: 2020-09-19 | Stop reason: HOSPADM

## 2020-09-17 RX ORDER — SODIUM CHLORIDE 9 MG/ML
INJECTION, SOLUTION INTRAVENOUS
Status: COMPLETED
Start: 2020-09-17 | End: 2020-09-17

## 2020-09-17 RX ADMIN — DIPHENHYDRAMINE HYDROCHLORIDE 25 MG: 25 CAPSULE ORAL at 10:16

## 2020-09-17 RX ADMIN — SODIUM CHLORIDE 250 ML: 9 INJECTION, SOLUTION INTRAVENOUS at 13:21

## 2020-09-17 RX ADMIN — ACETAMINOPHEN 650 MG: 325 TABLET, FILM COATED ORAL at 10:16

## 2020-09-17 RX ADMIN — FUROSEMIDE 20 MG: 10 INJECTION, SOLUTION INTRAMUSCULAR; INTRAVENOUS at 13:19

## 2020-09-17 NOTE — PROGRESS NOTES
Revlimid approved until 3/1/201-[employer]    I have escribed the rx to [employer] for processing.    Pt will contacted to discuss copay assistance.

## 2020-09-17 NOTE — PROGRESS NOTES
Staff message rec from Dr Headley-pt needs Revlimid 2.5 mg daily for MDS.    I have submitted the PA to [facility] Medicare through covermymeds.    Waiting for a decision.    Elieser Headley MD sent to [facility] Scruggs; Caterina Ashley, [facility]             Pt needs revlimid 2.5 mg daily for MDS

## 2020-09-17 NOTE — NURSING NOTE
0900 TO [facility] VIA W/C FOR SCHEDULED BLOOD TRANSFUSION. 2 UNITS PRBC'S GIVEN WITHOUT COMPLICATION. PATIENT LIED IN BED FOR TRANSFUSION, UP TO BR WITH ASSISTANCE AFTER LASIX GIVEN. PATIENT DID HAVE LUNCH HERE AS WELL. DISCHARGED HOME VIA W/C WITHOUT C/O @ 1700. AVS PRINTED & REVIEWED WITH PATIENT.

## 2020-09-18 ENCOUNTER — SPECIALTY PHARMACY (OUTPATIENT)
Dept: ONCOLOGY | Facility: CLINIC | Age: 80
End: 2020-09-18

## 2020-09-18 RX ORDER — ONDANSETRON HYDROCHLORIDE 8 MG/1
8 TABLET, FILM COATED ORAL 3 TIMES DAILY PRN
Qty: 30 TABLET | Refills: 5 | Status: SHIPPED | OUTPATIENT
Start: 2020-09-18 | End: 2021-01-01 | Stop reason: HOSPADM

## 2020-09-18 NOTE — PROGRESS NOTES
Oral Chemotherapy Teaching      Patient Name/:  Joya Singh   1940  Oral Chemotherapy Regimen:  Revlimid 2.5 mg po daily  Date Started Medication: Upon delivery  Telephonic education session with follow up mailing of educations sheets  Initial Teaching Follow Up Comments     Safety     Storage instructions (away from children; away from heat/cold, sunlight, or moisture), handling - use of gloves (caregivers), washing hands after touching pills, managing waste     “How are you storing your medications?”, reminders on storage, proper handling (caregivers using gloves, washing hands, away from children, managing waste, etc.), disposal of medication with D/C or dosage change    We discussed storage at room temp in a dry location, protected from light and secured from children and pets.  We discussed need for caregivers to wear gloves for administration and she was encouraged to bring unused supply to clinic for proper disposal.     Adherence      patient and/or caregiver on how to take medication, take with/without food, assess their adherence potential, stress importance of adherence, ways to manage adherence (pill boxes, phone reminders, calendars), what to do if miss a dose   “How are you taking your medication?” “How are you remembering to take your medication?”, “How many doses have you missed?”, determine reasons for non-adherence (not remembering, side effects, etc), ways to improve, overadherence? Remind patient of ways to improve/maintain adherence   Revlimid 2.5 mg po daily, may be taken with or without food, but at approximately same time each day.  Do not take dose if it has been more then 12 hours since you should have taken it, simply take the next dose at regularly scheduled time. Daughter in law was present for telephonic education and actively participated in questions.     Side Effects/Adverse Reactions      patient on potential side effects, s/s, ways to manage, when to call  MD/seek help     Determine if patient experiencing side effects, ways to manage  We discussed low WBCs and infection control precautions such as handwashing, wearing a mask and avoiding crowds.  She was encouraged to call for temp exceeding 100.4.  Platelet reduction and bleeding precautions were also reviewed.  She was counseled to call for excessive bruising, bleeding with oral care, nosebleeds, hematuria or blood in stool.  Diarrhea and its management with Imodium reviewed, along with constipation and its management with stool softeners such as Colace or Senokot. Anemia and its associated symptoms such as SOA, fatigue, and palpitations reviewed.  Rash/itchy skin were discussed.  She was encouraged to use SPF 30 sunblock.  Back pain and muscle spasms were discussed, long with APAP management.  Nausea was discussed and she was instructed that ondansetron will be escribed to her local pharmacy. Potential for blood clots as serious adverse event reviewed- she is on Eliquis.  She was instructed to call for extremity redness, tenderness or swelling.       Miscellaneous     Food interactions, DDIs, financial issues Determine if patient started any new medications since being placed on oral chemo (analyze for DDI) DDI- none noted per Up To Date     Additional Notes:  We reviewed Revlimid as part of a REMS program.  She was informed to let [facility] Scru[facility] know when 7 day supply was left, so that she could assist with REMS requirements for new rx each month.  She was also told that [facility] as pharmacy benefit advocate was working on her behalf to secure PA and any any needed copay assistance.  She and daughter in law were given the opportunity to ask questions.

## 2020-09-21 ENCOUNTER — TELEPHONE (OUTPATIENT)
Dept: INTERNAL MEDICINE | Facility: CLINIC | Age: 80
End: 2020-09-21

## 2020-09-21 ENCOUNTER — TELEPHONE (OUTPATIENT)
Dept: ONCOLOGY | Facility: CLINIC | Age: 80
End: 2020-09-21

## 2020-09-21 ENCOUNTER — PATIENT OUTREACH (OUTPATIENT)
Dept: CASE MANAGEMENT | Facility: OTHER | Age: 80
End: 2020-09-21

## 2020-09-21 DIAGNOSIS — N18.30 CKD STAGE 3 DUE TO TYPE 2 DIABETES MELLITUS (HCC): ICD-10-CM

## 2020-09-21 DIAGNOSIS — E11.22 CKD STAGE 3 DUE TO TYPE 2 DIABETES MELLITUS (HCC): ICD-10-CM

## 2020-09-21 DIAGNOSIS — I50.32 CHRONIC DIASTOLIC CHF (CONGESTIVE HEART FAILURE) (HCC): ICD-10-CM

## 2020-09-21 DIAGNOSIS — K31.84 DIABETIC GASTROPARESIS (HCC): ICD-10-CM

## 2020-09-21 DIAGNOSIS — Z79.4 TYPE 2 DIABETES MELLITUS WITH DIABETIC NEUROPATHY, WITH LONG-TERM CURRENT USE OF INSULIN (HCC): ICD-10-CM

## 2020-09-21 DIAGNOSIS — E11.40 TYPE 2 DIABETES MELLITUS WITH DIABETIC NEUROPATHY, WITH LONG-TERM CURRENT USE OF INSULIN (HCC): ICD-10-CM

## 2020-09-21 DIAGNOSIS — E66.01 MORBIDLY OBESE (HCC): ICD-10-CM

## 2020-09-21 DIAGNOSIS — M75.41 SUBACROMIAL IMPINGEMENT OF RIGHT SHOULDER: ICD-10-CM

## 2020-09-21 DIAGNOSIS — E11.43 DIABETIC GASTROPARESIS (HCC): ICD-10-CM

## 2020-09-21 DIAGNOSIS — I82.499 DEEP VEIN THROMBOSIS (DVT) OF OTHER VEIN OF LOWER EXTREMITY, UNSPECIFIED CHRONICITY, UNSPECIFIED LATERALITY (HCC): Primary | ICD-10-CM

## 2020-09-21 NOTE — TELEPHONE ENCOUNTER
Pt's daughter in law called stating pt has a cancer policy and is requiring pathology report and date of original diagnosis for insurance company. Pt is scheduled out at [facility] on Wed and they request a note be made so pt can get these documents at her appt. Note added to appt line, daughter in law v/u.

## 2020-09-21 NOTE — OUTREACH NOTE
Care Plan Note      Responses   Annual Wellness Visit:   Patient Has Completed   Care Gaps Addressed  Flu Shot, Diabetic Eye Exam, Mammogram, Colon Cancer Screening   Diabetic Eye Exam Status  Patient will Complete   Flu Shot Status  Up to Date   Flu Shot Com[facility] at [facility] [place] or [facility] [place]   Mammogram Status  Patient will Complete   Specific Disease Process Teaching  Diabetes   Other Patient Education/Resources   24/7 [facility] [place] Healthcare Nurse Call Line, Advanced Care Planning, MyChart   24/7 Nurse Call Line Education Method  Verbal   ACP Education Method  Verbal   [facility]  Verbal   Does patient have depression diagnosis?  Yes   Advanced Directives:  Patient Has   Ed Visits past 12 months:  1   Hospitalizations past 12 months  2 or 3   Medication Adherence  Medications understood        The main concerns and/or symptoms the patient would like to address are: Talked with patient. Discussed 8/12/20 to 8/13/20 hospitalization due to syncope and anemia. Patient is currently not receiving home health services and expresses she would like to have [facility] [place] [facility] services.  Confirms urology appointment for 9/22/20 as she will complete Keflex due UTI with symptoms of urinary frequency; burning and abdominal pain with voiding. She reports to have difficulty with left shoulder and fell about 2 months ago. She ambulates with cane/ walker as needed in home and uses wheelchair for longer distances. Patient lives with son and daughter in law; receiving assistance as needed with ADL's (requests assistance from home health aides);  meal preparation and transportation. She is compliant with medications; medical appointments (has appointment with Dr. Headley ); use of 02 at night;  and monitoring of blood sugars daily. She reports blood sugar today 108 but yesterday 55. Advised patient to contact PCP regarding recommendations . She verbalized understanding and states she will contact PCP.  Patient discusses  treatment of blood transfusions and Revlimid. She reports no difficulty with chest pain; SOB; sleeping but has decrease in appetite.     Education/instruction provided by Care Coordinator: Reviewed with patient education regarding DM; HTN; healthy diet; fall; safety and bleeding precautions; home health services; wheelchair maintenance; Life Alert;  COVID 19 precautions; 24/7 Nurse Line Telephone number; ACM contact information; Advance Directives; My Chart; gaps in care; [facility] Planning and Support services.  Patient verbalized understanding and states to appreciate phone call.  Patient has Life Alert. No further questions or concerns voiced at this time.     Follow Up Outreach Due:Follow up as needed.   Rebecca Will RN  Ambulatory     9/21/2020, 10:57 EDT

## 2020-09-21 NOTE — OUTREACH NOTE
Care Coordination Note  Talked with Jessica/ Nolvia -[phone] regarding wheelchair maintenance. She verbalized understanding and states to bring wheelchair to [address] ([facility]) for evaluation by .      Rebecca Will RN  Ambulatory     9/21/2020, 11:51 EDT

## 2020-09-21 NOTE — TELEPHONE ENCOUNTER
Airam with [facility] stated she is calling about a referral she received for home health . Airam stated that with the shortage in coverage unable to go to that area at this time .           Airam call back [phone]

## 2020-09-21 NOTE — OUTREACH NOTE
Patient Outreach Note  Talked with patient and relayed information regarding home health referral for services by PCP; [facility] contact information; and wheelchair maintenance. Patient verbalized understanding and states to appreciate assistance. No further  questions or concerns voiced at this time.      Rebecca Will RN  Ambulatory     9/21/2020, 12:01 EDT

## 2020-09-21 NOTE — OUTREACH NOTE
Care Coordination Note  Talked with Heydi/ Dr. Mercado's office regarding home health referral with patient's request for [facility]. She verbalized understanding and will follow up regarding referral.     Rebecca Will RN  Ambulatory     9/21/2020, 11:15 EDT

## 2020-09-23 ENCOUNTER — LAB (OUTPATIENT)
Dept: LAB | Facility: HOSPITAL | Age: 80
End: 2020-09-23

## 2020-09-23 ENCOUNTER — INFUSION (OUTPATIENT)
Dept: ONCOLOGY | Facility: HOSPITAL | Age: 80
End: 2020-09-23

## 2020-09-23 VITALS — TEMPERATURE: 99 F

## 2020-09-23 DIAGNOSIS — E83.111 IRON OVERLOAD, TRANSFUSIONAL: ICD-10-CM

## 2020-09-23 LAB
BASOPHILS # BLD AUTO: 0.1 10*3/MM3 (ref 0–0.2)
BASOPHILS NFR BLD AUTO: 1.8 % (ref 0–1.5)
DEPRECATED RDW RBC AUTO: 55.9 FL (ref 37–54)
EOSINOPHIL # BLD AUTO: 0.43 10*3/MM3 (ref 0–0.4)
EOSINOPHIL NFR BLD AUTO: 7.6 % (ref 0.3–6.2)
ERYTHROCYTE [DISTWIDTH] IN BLOOD BY AUTOMATED COUNT: 17.9 % (ref 12.3–15.4)
HCT VFR BLD AUTO: 32.1 % (ref 34–46.6)
HGB BLD-MCNC: 10.4 G/DL (ref 12–15.9)
IMM GRANULOCYTES # BLD AUTO: 0.06 10*3/MM3 (ref 0–0.05)
IMM GRANULOCYTES NFR BLD AUTO: 1.1 % (ref 0–0.5)
LYMPHOCYTES # BLD AUTO: 2.23 10*3/MM3 (ref 0.7–3.1)
LYMPHOCYTES NFR BLD AUTO: 39.5 % (ref 19.6–45.3)
MCH RBC QN AUTO: 30.7 PG (ref 26.6–33)
MCHC RBC AUTO-ENTMCNC: 32.4 G/DL (ref 31.5–35.7)
MCV RBC AUTO: 94.7 FL (ref 79–97)
MONOCYTES # BLD AUTO: 0.26 10*3/MM3 (ref 0.1–0.9)
MONOCYTES NFR BLD AUTO: 4.6 % (ref 5–12)
NEUTROPHILS NFR BLD AUTO: 2.56 10*3/MM3 (ref 1.7–7)
NEUTROPHILS NFR BLD AUTO: 45.4 % (ref 42.7–76)
NRBC BLD AUTO-RTO: 0 /100 WBC (ref 0–0.2)
PLATELET # BLD AUTO: 278 10*3/MM3 (ref 140–450)
PMV BLD AUTO: 11.8 FL (ref 6–12)
RBC # BLD AUTO: 3.39 10*6/MM3 (ref 3.77–5.28)
WBC # BLD AUTO: 5.64 10*3/MM3 (ref 3.4–10.8)

## 2020-09-23 PROCEDURE — G0463 HOSPITAL OUTPT CLINIC VISIT: HCPCS

## 2020-09-23 PROCEDURE — 85025 COMPLETE CBC W/AUTO DIFF WBC: CPT | Performed by: INTERNAL MEDICINE

## 2020-09-23 PROCEDURE — 36415 COLL VENOUS BLD VENIPUNCTURE: CPT

## 2020-09-23 NOTE — NURSING NOTE
Procrit was held today for hemoglobin of 10.4. The pt was informed and v/u. She was given printed info regarding Revlimid from [email or web address]. The pt signed the Revlimid consent forms which will be scanned into the pt's file. The pt was given copies also. She explained that she will start the Revlimid tomorrow since it it due to be delivered then. She was urged to contact us for any questions or concerns. The pt v/u.

## 2020-09-24 ENCOUNTER — PATIENT OUTREACH (OUTPATIENT)
Dept: CASE MANAGEMENT | Facility: OTHER | Age: 80
End: 2020-09-24

## 2020-09-24 ENCOUNTER — TELEPHONE (OUTPATIENT)
Dept: INTERNAL MEDICINE | Facility: CLINIC | Age: 80
End: 2020-09-24

## 2020-09-24 NOTE — OUTREACH NOTE
Care Plan Note      Responses   Lifestyle Goals  Routine follow-up with doctor(s), Medication management   Barriers  Disease education, Side effects of medication   Self Management  Medication Adherence   Annual Wellness Visit:   Patient Has Completed   Care Gaps Addressed  Flu Shot, Diabetic Eye Exam, Diabetic A1C, Mammogram   HbA1c Status  Up to Date-within defined limits   HbA1c Completion at [facility] or Other  [facility]   Diabetic Eye Exam Status  Patient will Complete   Flu Shot Status  Up to Date   Mammogram Status  Up to Date   Specific Disease Process Teaching  Diabetes   Other Patient Education/Resources   24/7 [facility] Nurse Call Line, Advanced Care Planning, [facility]   24/7 Nurse Call Line Education Method  Send Materials   Does patient have depression diagnosis?  Yes   Advanced Directives:  Patient Has   Ed Visits past 12 months:  2 or 3   Hospitalizations past 12 months  1   Medication Adherence  Medications understood        The main concerns and/or symptoms the patient would like to address are: Talked with patient. Patient states to be receiving [facility] services of SN, PT OT and very glad. She was very pleased with PT visit today and ambulated without assistive device. She states PT will be on hold until 10/23/20 and will be receiving OT. She reports episodes of SOB and decrease appetite. She will begin Revlimid today; reviewed side effects and to contact physician for any questions or concerns. She verbalized understanding. She is compliant with medications; monitoring of blood sugars which she states have been fluctuating.     Education/instruction provided by Care Coordinator: Reviewed with patient home health services; side effects of Revlimid;  medications; physician appointments; education regarding DM; diet;  COVID 19 precautions; 24/7 Nurse Line Telephone number; ACM contact information; gaps in care; [facility] Planning and Support services.  Patient  verbalized understanding and states to appreciate phone call.  No further questions or concerns voiced at this time.     Follow Up Outreach Due: Follow up as needed.     Rebecca Will RN  Ambulatory     9/24/2020, 15:50 EDT

## 2020-09-24 NOTE — TELEPHONE ENCOUNTER
[facility] STATES HE IS GOING TO PUT THE PATIENT ON HOLD UNTIL ONE MONTH. HE WOULD LIKE TO HAVE AN ORDER FOR THE WEEK OF October 19TH FOR PHYSICAL THERAPY REASSESSMENT .     PLEASE ADVISE  [phone]  [facility] PT

## 2020-09-24 NOTE — TELEPHONE ENCOUNTER
Skilled nursing one time weekly x 3 weeks then every other week.  Verbal orders given.    Patient also rates shoulder pain as a 10

## 2020-09-28 ENCOUNTER — DOCUMENTATION (OUTPATIENT)
Dept: ONCOLOGY | Facility: CLINIC | Age: 80
End: 2020-09-28

## 2020-09-29 ENCOUNTER — DOCUMENTATION (OUTPATIENT)
Dept: ONCOLOGY | Facility: HOSPITAL | Age: 80
End: 2020-09-29

## 2020-09-29 NOTE — PROGRESS NOTES
"Pt called today complaining of a \"bad\" rash.  She was recently prescribed revlimid which was delivered to her on 9/24/20.  Per Dr Headley, Pt instructed to stop taking revlimid and to take benadryl 25 mg every 6 hours as needed.  Pt has an appointment already scheduled for tomorrow so her rash will be evaluated at that time. Pt V/U.    "

## 2020-09-30 ENCOUNTER — LAB (OUTPATIENT)
Dept: LAB | Facility: HOSPITAL | Age: 80
End: 2020-09-30

## 2020-09-30 ENCOUNTER — INFUSION (OUTPATIENT)
Dept: ONCOLOGY | Facility: HOSPITAL | Age: 80
End: 2020-09-30

## 2020-09-30 VITALS — HEART RATE: 80 BPM | DIASTOLIC BLOOD PRESSURE: 70 MMHG | SYSTOLIC BLOOD PRESSURE: 110 MMHG | TEMPERATURE: 97.7 F

## 2020-09-30 DIAGNOSIS — E83.111 IRON OVERLOAD, TRANSFUSIONAL: ICD-10-CM

## 2020-09-30 DIAGNOSIS — D64.9 ANEMIA REQUIRING TRANSFUSIONS: Primary | ICD-10-CM

## 2020-09-30 DIAGNOSIS — N18.30 CKD STAGE 3 DUE TO TYPE 2 DIABETES MELLITUS (HCC): ICD-10-CM

## 2020-09-30 DIAGNOSIS — E11.22 CKD STAGE 3 DUE TO TYPE 2 DIABETES MELLITUS (HCC): ICD-10-CM

## 2020-09-30 LAB
BASOPHILS # BLD AUTO: 0.12 10*3/MM3 (ref 0–0.2)
BASOPHILS NFR BLD AUTO: 2.5 % (ref 0–1.5)
DEPRECATED RDW RBC AUTO: 59.1 FL (ref 37–54)
EOSINOPHIL # BLD AUTO: 0.44 10*3/MM3 (ref 0–0.4)
EOSINOPHIL NFR BLD AUTO: 9.1 % (ref 0.3–6.2)
ERYTHROCYTE [DISTWIDTH] IN BLOOD BY AUTOMATED COUNT: 18.2 % (ref 12.3–15.4)
HCT VFR BLD AUTO: 27.8 % (ref 34–46.6)
HGB BLD-MCNC: 8.8 G/DL (ref 12–15.9)
IMM GRANULOCYTES # BLD AUTO: 0.07 10*3/MM3 (ref 0–0.05)
IMM GRANULOCYTES NFR BLD AUTO: 1.5 % (ref 0–0.5)
LYMPHOCYTES # BLD AUTO: 1.62 10*3/MM3 (ref 0.7–3.1)
LYMPHOCYTES NFR BLD AUTO: 33.7 % (ref 19.6–45.3)
MCH RBC QN AUTO: 31 PG (ref 26.6–33)
MCHC RBC AUTO-ENTMCNC: 31.7 G/DL (ref 31.5–35.7)
MCV RBC AUTO: 97.9 FL (ref 79–97)
MONOCYTES # BLD AUTO: 0.12 10*3/MM3 (ref 0.1–0.9)
MONOCYTES NFR BLD AUTO: 2.5 % (ref 5–12)
NEUTROPHILS NFR BLD AUTO: 2.44 10*3/MM3 (ref 1.7–7)
NEUTROPHILS NFR BLD AUTO: 50.7 % (ref 42.7–76)
PLATELET # BLD AUTO: 316 10*3/MM3 (ref 140–450)
PMV BLD AUTO: 12.7 FL (ref 6–12)
RBC # BLD AUTO: 2.84 10*6/MM3 (ref 3.77–5.28)
WBC # BLD AUTO: 4.81 10*3/MM3 (ref 3.4–10.8)

## 2020-09-30 PROCEDURE — 36415 COLL VENOUS BLD VENIPUNCTURE: CPT

## 2020-09-30 PROCEDURE — 25010000002 EPOETIN ALFA PER 1000 UNITS: Performed by: INTERNAL MEDICINE

## 2020-09-30 PROCEDURE — 85025 COMPLETE CBC W/AUTO DIFF WBC: CPT | Performed by: INTERNAL MEDICINE

## 2020-09-30 PROCEDURE — 96372 THER/PROPH/DIAG INJ SC/IM: CPT

## 2020-09-30 RX ORDER — LEVOFLOXACIN 500 MG/1
500 TABLET, FILM COATED ORAL DAILY
COMMUNITY
Start: 2020-09-16 | End: 2020-11-11 | Stop reason: ALTCHOICE

## 2020-09-30 RX ADMIN — ERYTHROPOIETIN 60000 UNITS: 40000 INJECTION, SOLUTION INTRAVENOUS; SUBCUTANEOUS at 10:15

## 2020-09-30 NOTE — NURSING NOTE
Pt with blotchy, itchy  rash to bilateral legs, back and hips.  Pt also c/o her scalp itching but no rash noted.  Pt reports that rash started within a few hours of taking her first dose of revlimid Thursday 9/24/20.  She stopped taking revlimid as directed on Tuesday 9/29/20.  Per Dr Headley, pt to hold revlimid until seen back in our next Wednesday when we can reevaluate her rash.  Pt V/U

## 2020-10-01 ENCOUNTER — MEDICATION THERAPY MANAGEMENT (OUTPATIENT)
Dept: PHARMACY | Facility: HOSPITAL | Age: 80
End: 2020-10-01

## 2020-10-01 NOTE — PROGRESS NOTES
MTM consent check, telephone follow-up for side effects    Consents signed/scanned. Left VM for Ms. Singh 10:45 am w/ MTM direct line [phone]. Pharmacy will call again tomorrow.    Benja Jamison  Pharmacist Intern

## 2020-10-02 ENCOUNTER — PATIENT OUTREACH (OUTPATIENT)
Dept: CASE MANAGEMENT | Facility: OTHER | Age: 80
End: 2020-10-02

## 2020-10-02 DIAGNOSIS — M25.561 ACUTE PAIN OF RIGHT KNEE: ICD-10-CM

## 2020-10-02 RX ORDER — FUROSEMIDE 20 MG/1
TABLET ORAL
Qty: 90 TABLET | Refills: 1 | Status: SHIPPED | OUTPATIENT
Start: 2020-10-02 | End: 2021-01-01 | Stop reason: SDUPTHER

## 2020-10-02 RX ORDER — GABAPENTIN 400 MG/1
CAPSULE ORAL
Qty: 120 CAPSULE | Refills: 2 | Status: SHIPPED | OUTPATIENT
Start: 2020-10-02 | End: 2020-12-18

## 2020-10-02 NOTE — OUTREACH NOTE
Care Coordination Note  Talked with [facility] [phone]. She states patient is current receiving SN; PT; OT home health services with start of care 9/23/20.     Rebecca Will RN  Ambulatory     10/2/2020, 15:10 EDT

## 2020-10-02 NOTE — OUTREACH NOTE
Patient Outreach Note  Talked with patient. Patient states to have discontinued Revlimid per Dr. Headley's recommendation due to development of skin rash. She is taking Benadryl as directed;  states rash has improved and has 10/7/20 follow up with Dr. Headley for recommendations. Confirms 10/6/20  ortho appointment regarding left shoulder pain. She reports to feel tired; weak; have decrease in appetite but not difficulty with chest pain; SOB or sleeping. She continues with home health services and discusses OT services regarding personal care. Patient reports to have increase in left shoulder pain and has questions regarding pain medication. RN-ACM advised patient to contact PCP for recommendations. Patient verbalized understanding and will contact PCP. Patient requested RN-ACM assistance in contacting home margo regarding personal care and assisting  with ADL's.     Rebecca Will RN  Ambulatory     10/2/2020, 15:24 EDT

## 2020-10-02 NOTE — OUTREACH NOTE
Patient Outreach Note  Talked with patient of information regarding home health services and [facility] contact information. Patient verbalized understanding. No further questions voiced at this time.     Rebecca Will RN  Ambulatory     10/2/2020, 15:50 EDT

## 2020-10-02 NOTE — OUTREACH NOTE
Care Coordination Note  Talked with [facility] at [facility] [phone] and relayed information from patient regarding assistance with ADL's and personal care. She verbalized understanding and will follow up with OT.     Rebecca Will RN  Ambulatory     10/2/2020, 15:49 EDT

## 2020-10-02 NOTE — TELEPHONE ENCOUNTER
PATIENT CALLED TO ASK IF SHE CAN TAKE TYLENOL IN CONJUNCTION WITH HER HYDROcodone-acetaminophen (Norco) 5-325 MG per tablet    SHE IS IN A LOT OF PAIN AND WOULD LIKE TO TRY TO GET SOME RELIEF    PLEASE CALL  [phone]

## 2020-10-05 RX ORDER — PEN NEEDLE, DIABETIC 29 G X1/2"
NEEDLE, DISPOSABLE MISCELLANEOUS
Qty: 400 EACH | Refills: 3 | Status: SHIPPED | OUTPATIENT
Start: 2020-10-05

## 2020-10-05 RX ORDER — POTASSIUM CHLORIDE 750 MG/1
TABLET, FILM COATED, EXTENDED RELEASE ORAL
Qty: 90 TABLET | Refills: 2 | Status: SHIPPED | OUTPATIENT
Start: 2020-10-05 | End: 2021-01-01

## 2020-10-06 ENCOUNTER — OFFICE VISIT (OUTPATIENT)
Dept: ORTHOPEDIC SURGERY | Facility: CLINIC | Age: 80
End: 2020-10-06

## 2020-10-06 ENCOUNTER — PATIENT OUTREACH (OUTPATIENT)
Dept: CASE MANAGEMENT | Facility: OTHER | Age: 80
End: 2020-10-06

## 2020-10-06 VITALS — WEIGHT: 196 LBS | HEIGHT: 61 IN | BODY MASS INDEX: 37 KG/M2

## 2020-10-06 DIAGNOSIS — M77.12 LATERAL EPICONDYLITIS, LEFT ELBOW: Primary | ICD-10-CM

## 2020-10-06 DIAGNOSIS — M19.012 PRIMARY LOCALIZED OSTEOARTHROSIS OF LEFT SHOULDER REGION: ICD-10-CM

## 2020-10-06 PROCEDURE — 99214 OFFICE O/P EST MOD 30 MIN: CPT | Performed by: NURSE PRACTITIONER

## 2020-10-06 PROCEDURE — 20610 DRAIN/INJ JOINT/BURSA W/O US: CPT | Performed by: NURSE PRACTITIONER

## 2020-10-06 PROCEDURE — 20550 NJX 1 TENDON SHEATH/LIGAMENT: CPT | Performed by: NURSE PRACTITIONER

## 2020-10-06 RX ADMIN — LIDOCAINE HYDROCHLORIDE 4 ML: 10 INJECTION, SOLUTION EPIDURAL; INFILTRATION; INTRACAUDAL; PERINEURAL at 11:32

## 2020-10-06 RX ADMIN — BETAMETHASONE SODIUM PHOSPHATE AND BETAMETHASONE ACETATE 12 MG: 3; 3 INJECTION, SUSPENSION INTRA-ARTICULAR; INTRALESIONAL; INTRAMUSCULAR; SOFT TISSUE at 11:32

## 2020-10-06 RX ADMIN — BETAMETHASONE SODIUM PHOSPHATE AND BETAMETHASONE ACETATE 6 MG: 3; 3 INJECTION, SUSPENSION INTRA-ARTICULAR; INTRALESIONAL; INTRAMUSCULAR; SOFT TISSUE at 11:33

## 2020-10-06 RX ADMIN — LIDOCAINE HYDROCHLORIDE 3 ML: 10 INJECTION, SOLUTION EPIDURAL; INFILTRATION; INTRACAUDAL; PERINEURAL at 11:33

## 2020-10-06 NOTE — OUTREACH NOTE
Patient Outreach Note  Incoming call from patient. Patient discusses ortho appointment scheduled for today and home health service and requests RN-ACM assistance in contacting home health agency. 3 way phone call made with patient and talked with [facility] [phone]. RN-ACM encouraged patient to contact home health or any health care provider anytime as needed. She verbalized understanding. No further questions or concerns voiced at this time.     Rebecca Will RN  Ambulatory     10/6/2020, 10:40 EDT

## 2020-10-06 NOTE — PROGRESS NOTES
Subjective:     Patient ID: Joya Singh is a 80 y.o. female.    Chief Complaint:  Follow-up DJD left shoulder   History of Present Illness  Joya Singh returns to clinic for follow-up left shoulder.  Did not receive any significant symptom relief from previous corticosteroid injection she received approximately 4 weeks ago.  Maximal tenderness present the anterior joint line of the left shoulder increased pain with attempted forward flexion, external rotation very limited range of motion.  Again she does states she is not a candidate for total joint replacement surgery.  She is also experiencing pain at the elbow with crepitus with extension and flexion activities.  Maximal tenderness present at the elbow at the lateral epicondyle also mild tenderness present the medial epicondyle.  Increased pain noted with supination and pronation of the forearm, wrist range of motion also increases pain at the dorsal surface of the forearm and the elbow.  Presence of numbness or tingling left upper extremity pain does occasionally radiate to the side of the neck but on a consistent basis.  Positive for significant weakness left upper extremity.  Rates discomfort a 10 out of the 10 describes pain as aching, throbbing, sharp, shooting in nature with motion mild symptom relief with rest.  Denies other concerns present time    Social History     Occupational History     Employer: RETIRED   Tobacco Use   • Smoking status: Never Smoker   • Smokeless tobacco: Never Used   • Tobacco comment: CAFFEINE USE: NONE   Substance and Sexual Activity   • Alcohol use: No   • Drug use: No   • Sexual activity: Defer     Comment: EXERCISE - RARELY      Past Medical History:   Diagnosis Date   • Allergic rhinitis    • Anemia    • Anxiety    • Appetite absent    • Arthritis    • Asthma    • Back pain    • Bell's palsy    • Black tarry stools    • Blood in stool    • Chronic fatigue    • CKD (chronic kidney disease) stage 3, GFR 30-59 ml/min    • Community  acquired pneumonia of left lung 10/25/2018   • Cough    • Depression    • Diabetes mellitus (CMS/HCC)     LAST A1C 6   • Diabetic gastroparesis (CMS/HCC) 2/19/2016   • Difficulty walking    • Excessive urination at night    • Frequent urination    • GERD (gastroesophageal reflux disease)    • GI bleed    • Gout    • H/O blood clots     LEFT LEG 7 OR 8 YEARS AGO   • Heat intolerance    • History of fall 10/2018   • History of prior pregnancies     x8, miscarriage 5   • History of transfusion 11/2018    due to anemia   • Hyperlipidemia    • Hypertension    • Hypothyroidism    • Normal coronary arteries     by cath 2013   • Orthostatic hypotension    • AARON (obstructive sleep apnea)     DOESNT WEAR REGULARLY   • PONV (postoperative nausea and vomiting)    • Skin cancer    • Stroke (CMS/HCC)     Several mini-strokes   • TIA (transient ischemic attack)     LAST TIA JULY 2017   • Urination pain    • UTI (urinary tract infection)     Dec 2018 and Jan 2019     Past Surgical History:   Procedure Laterality Date   • BACK SURGERY      HARDWARE   • CHOLECYSTECTOMY      OPEN   • COLONOSCOPY  2011    due for repeat in 2021   • COLONOSCOPY N/A 10/28/2018    Procedure: COLONOSCOPY;  Surgeon: Emmanuel Rogers MD;  Location: [facility];  Service: Gastroenterology   • ENDOSCOPY N/A 10/26/2018    Procedure: ESOPHAGOGASTRODUODENOSCOPY;  Surgeon: Emmanuel Rogers MD;  Location: [facility] OR;  Service: Gastroenterology   • HYSTERECTOMY      PARTIAL    • KNEE SURGERY     • NECK SURGERY     • SPINE SURGERY     • TUMOR REMOVAL Left     Leg   • UPPER GASTROINTESTINAL ENDOSCOPY  2014    gastritis.  done by dr. hastings       Family History   Problem Relation Age of Onset   • Lupus Mother    • Heart failure Mother 59   • Heart disease Other    • Hypertension Other    • Heart attack Father    • Breast cancer Neg Hx          Review of Systems   Constitutional: Negative for chills, diaphoresis, fever and unexpected weight change.  "  HENT: Negative for hearing loss, nosebleeds, sore throat and tinnitus.    Eyes: Negative for pain and visual disturbance.   Respiratory: Negative for cough, shortness of breath and wheezing.    Cardiovascular: Negative for chest pain and palpitations.   Gastrointestinal: Negative for abdominal pain, diarrhea, nausea and vomiting.   Endocrine: Negative for cold intolerance, heat intolerance and polydipsia.   Genitourinary: Negative for difficulty urinating, dysuria and hematuria.   Musculoskeletal: Positive for arthralgias and myalgias. Negative for joint swelling.   Skin: Negative for rash and wound.   Allergic/Immunologic: Negative for environmental allergies.   Neurological: Negative for dizziness, syncope and numbness.   Hematological: Does not bruise/bleed easily.   Psychiatric/Behavioral: Negative for dysphoric mood and sleep disturbance. The patient is not nervous/anxious.            Objective:  Physical Exam    General: No acute distress.  Eyes: conjunctiva clear; pupils equally round and reactive  ENT: external ears and nose atraumatic; oropharynx clear  CV: no peripheral edema  Resp: normal respiratory effort  Skin: no rashes or wounds; normal turgor  Psych: mood and affect appropriate; recent and remote memory intact    Vitals:    10/06/20 1124   Weight: 88.9 kg (196 lb)   Height: 154.9 cm (61\")         10/06/20  1124   Weight: 88.9 kg (196 lb)     Body mass index is 37.03 kg/m².      Ortho Exam     Left shoulder exam:  Active forward flexion 45 degrees, passive 90 degrees,  External rotation 20 degrees, passive 45 degrees  Internal rotation to side  Strength 3 out of 5 all planes  Positive sensation light touch all distributions left upper extremity  Maximal tenderness lateral aspect of the acromion, AC joint, anterior joint line  2+ distal radial pulse  Elbow extension 0 degrees, flexion 100 degrees with crepitus noted with motion, palpable tenderness lateral epicondyle, positive sensation light touch " all distributions left upper extremity, flex extend all digits left hand, 2+ distal radial pulse, mildly positive tenderness medial epicondyle greater laterally with pain radiating down dorsal surface forearm to wrist brisk cap refill all digits left hand      Assessment:        1. Lateral epicondylitis, left elbow    2. Primary localized osteoarthrosis of left shoulder region           Plan:  1.  Discussed plan of care with patient.  Wish to proceed corticosteroid injection glenohumeral joint of the left shoulder, lateral medial condyle injection left elbow.  Fitted with wrist immobilizer left upper extremity encouraged wear at night.  Encouraged continued range of motion left wrist as tolerated.  Plan to see her back in clinic in 3 months or sooner if not improving.  She verbalized understanding of information agrees with plan of care.  Denies other concerns present time.    Large Joint Arthrocentesis: L glenohumeral  Date/Time: 10/6/2020 11:32 AM  Consent given by: patient  Site marked: site marked  Timeout: Immediately prior to procedure a time out was called to verify the correct patient, procedure, equipment, support staff and site/side marked as required   Supporting Documentation  Indications: pain   Procedure Details  Location: shoulder - L glenohumeral  Preparation: Patient was prepped and draped in the usual sterile fashion  Needle size: 22 G  Approach: anterior  Medications administered: 4 mL lidocaine PF 1% 1 %; 12 mg betamethasone acetate-betamethasone sodium phosphate 6 (3-3) MG/ML      Medium Joint Arthrocentesis: L elbow  Date/Time: 10/6/2020 11:33 AM  Consent given by: patient  Site marked: site marked  Timeout: Immediately prior to procedure a time out was called to verify the correct patient, procedure, equipment, support staff and site/side marked as required   Supporting Documentation  Indications: pain   Procedure Details  Location: elbow - L elbow  Preparation: Patient was prepped and draped  in the usual sterile fashion  Needle size: 22 G  Approach: lateral.  Medications administered: 3 mL lidocaine PF 1% 1 %; 6 mg betamethasone acetate-betamethasone sodium phosphate 6 (3-3) MG/ML  Patient tolerance: patient tolerated the procedure well with no immediate complications          Orders:  Orders Placed This Encounter   Procedures   • Large Joint Arthrocentesis: L glenohumeral   • Medium Joint Arthrocentesis: L elbow       Medications:  No orders of the defined types were placed in this encounter.      Followup:  Return in about 3 months (around 1/6/2021).    Joya was seen today for follow-up and pain.    Diagnoses and all orders for this visit:    Lateral epicondylitis, left elbow    Primary localized osteoarthrosis of left shoulder region    Other orders  -     Large Joint Arthrocentesis: L glenohumeral  -     Medium Joint Arthrocentesis: L elbow          Dictated utilizing Dragon dictation

## 2020-10-07 ENCOUNTER — LAB (OUTPATIENT)
Dept: LAB | Facility: HOSPITAL | Age: 80
End: 2020-10-07

## 2020-10-07 ENCOUNTER — INFUSION (OUTPATIENT)
Dept: ONCOLOGY | Facility: HOSPITAL | Age: 80
End: 2020-10-07

## 2020-10-07 VITALS — TEMPERATURE: 97.8 F

## 2020-10-07 DIAGNOSIS — D64.9 ANEMIA REQUIRING TRANSFUSIONS: Primary | ICD-10-CM

## 2020-10-07 DIAGNOSIS — N18.30 CKD STAGE 3 DUE TO TYPE 2 DIABETES MELLITUS (HCC): ICD-10-CM

## 2020-10-07 DIAGNOSIS — E83.111 IRON OVERLOAD, TRANSFUSIONAL: ICD-10-CM

## 2020-10-07 DIAGNOSIS — E11.22 CKD STAGE 3 DUE TO TYPE 2 DIABETES MELLITUS (HCC): ICD-10-CM

## 2020-10-07 LAB
ABO GROUP BLD: NORMAL
BASOPHILS # BLD AUTO: 0.03 10*3/MM3 (ref 0–0.2)
BASOPHILS NFR BLD AUTO: 0.4 % (ref 0–1.5)
BLD GP AB SCN SERPL QL: NEGATIVE
DEPRECATED RDW RBC AUTO: 57.4 FL (ref 37–54)
EOSINOPHIL # BLD AUTO: 0 10*3/MM3 (ref 0–0.4)
EOSINOPHIL NFR BLD AUTO: 0 % (ref 0.3–6.2)
ERYTHROCYTE [DISTWIDTH] IN BLOOD BY AUTOMATED COUNT: 17.4 % (ref 12.3–15.4)
HCT VFR BLD AUTO: 23.3 % (ref 34–46.6)
HGB BLD-MCNC: 7.5 G/DL (ref 12–15.9)
IMM GRANULOCYTES # BLD AUTO: 0.11 10*3/MM3 (ref 0–0.05)
IMM GRANULOCYTES NFR BLD AUTO: 1.5 % (ref 0–0.5)
LYMPHOCYTES # BLD AUTO: 0.75 10*3/MM3 (ref 0.7–3.1)
LYMPHOCYTES NFR BLD AUTO: 10.2 % (ref 19.6–45.3)
MCH RBC QN AUTO: 30.6 PG (ref 26.6–33)
MCHC RBC AUTO-ENTMCNC: 32.2 G/DL (ref 31.5–35.7)
MCV RBC AUTO: 95.1 FL (ref 79–97)
MONOCYTES # BLD AUTO: 0.19 10*3/MM3 (ref 0.1–0.9)
MONOCYTES NFR BLD AUTO: 2.6 % (ref 5–12)
NEUTROPHILS NFR BLD AUTO: 6.27 10*3/MM3 (ref 1.7–7)
NEUTROPHILS NFR BLD AUTO: 85.3 % (ref 42.7–76)
NRBC BLD AUTO-RTO: 0 /100 WBC (ref 0–0.2)
PLATELET # BLD AUTO: 532 10*3/MM3 (ref 140–450)
PMV BLD AUTO: 12 FL (ref 6–12)
RBC # BLD AUTO: 2.45 10*6/MM3 (ref 3.77–5.28)
RH BLD: POSITIVE
T&S EXPIRATION DATE: NORMAL
WBC # BLD AUTO: 7.35 10*3/MM3 (ref 3.4–10.8)

## 2020-10-07 PROCEDURE — 96372 THER/PROPH/DIAG INJ SC/IM: CPT

## 2020-10-07 PROCEDURE — 86923 COMPATIBILITY TEST ELECTRIC: CPT

## 2020-10-07 PROCEDURE — 25010000002 EPOETIN ALFA PER 1000 UNITS: Performed by: INTERNAL MEDICINE

## 2020-10-07 PROCEDURE — 86901 BLOOD TYPING SEROLOGIC RH(D): CPT | Performed by: INTERNAL MEDICINE

## 2020-10-07 PROCEDURE — 86850 RBC ANTIBODY SCREEN: CPT | Performed by: INTERNAL MEDICINE

## 2020-10-07 PROCEDURE — 85025 COMPLETE CBC W/AUTO DIFF WBC: CPT | Performed by: INTERNAL MEDICINE

## 2020-10-07 PROCEDURE — 36415 COLL VENOUS BLD VENIPUNCTURE: CPT

## 2020-10-07 PROCEDURE — 86900 BLOOD TYPING SEROLOGIC ABO: CPT | Performed by: INTERNAL MEDICINE

## 2020-10-07 RX ORDER — ACETAMINOPHEN 325 MG/1
650 TABLET ORAL ONCE
Status: CANCELLED | OUTPATIENT
Start: 2020-10-07 | End: 2020-10-07

## 2020-10-07 RX ORDER — LIDOCAINE HYDROCHLORIDE 10 MG/ML
4 INJECTION, SOLUTION EPIDURAL; INFILTRATION; INTRACAUDAL; PERINEURAL
Status: COMPLETED | OUTPATIENT
Start: 2020-10-06 | End: 2020-10-06

## 2020-10-07 RX ORDER — BETAMETHASONE SODIUM PHOSPHATE AND BETAMETHASONE ACETATE 3; 3 MG/ML; MG/ML
6 INJECTION, SUSPENSION INTRA-ARTICULAR; INTRALESIONAL; INTRAMUSCULAR; SOFT TISSUE
Status: COMPLETED | OUTPATIENT
Start: 2020-10-06 | End: 2020-10-06

## 2020-10-07 RX ORDER — DIPHENHYDRAMINE HCL 25 MG
25 CAPSULE ORAL ONCE
Status: CANCELLED | OUTPATIENT
Start: 2020-10-07 | End: 2020-10-07

## 2020-10-07 RX ORDER — LIDOCAINE HYDROCHLORIDE 10 MG/ML
3 INJECTION, SOLUTION EPIDURAL; INFILTRATION; INTRACAUDAL; PERINEURAL
Status: COMPLETED | OUTPATIENT
Start: 2020-10-06 | End: 2020-10-06

## 2020-10-07 RX ORDER — BETAMETHASONE SODIUM PHOSPHATE AND BETAMETHASONE ACETATE 3; 3 MG/ML; MG/ML
12 INJECTION, SUSPENSION INTRA-ARTICULAR; INTRALESIONAL; INTRAMUSCULAR; SOFT TISSUE
Status: COMPLETED | OUTPATIENT
Start: 2020-10-06 | End: 2020-10-06

## 2020-10-07 RX ORDER — FUROSEMIDE 10 MG/ML
20 INJECTION INTRAMUSCULAR; INTRAVENOUS ONCE
Status: CANCELLED | OUTPATIENT
Start: 2020-10-07 | End: 2020-10-07

## 2020-10-07 RX ORDER — SODIUM CHLORIDE 9 MG/ML
250 INJECTION, SOLUTION INTRAVENOUS AS NEEDED
Status: CANCELLED | OUTPATIENT
Start: 2020-10-07

## 2020-10-07 RX ADMIN — ERYTHROPOIETIN 60000 UNITS: 40000 INJECTION, SOLUTION INTRAVENOUS; SUBCUTANEOUS at 10:59

## 2020-10-07 NOTE — NURSING NOTE
HGB 7.5 today.  Pt feeling increased fatigue.  [facility] units [facility] 10/7/20 at 9 am .  T&S drawn and blood bank armband placed on patient.  Pt's rash on lower extremities improved.. faded but still visible.  Pt denies pruritis but is taking benadryl 25 mg every 6 hours and 50 mg at bedtime. She is also using topical hydrocortisone. Per Dr Headley, Pt advised to resume revlimid 2.5 mg every other day , stop benadryl and to stop revlimid if rash develops or current rash flares again.  Pt's rash  will be reevaluated next week with her scheduled appointment.

## 2020-10-07 NOTE — PATIENT INSTRUCTIONS
"  Call Dr. Elieser Headley, [facility] at [phone] if you have any problems or concerns.    We know you have a Choice in healthcare and appreciate you using [facility].  Our purpose is to provide you \"Excellent Care\".  We hope that you will always choose us in the future and continue to recommend us to your family and friends.              https://www.[email or web address]/donate-blood/blood-donation-process/what-happens-to-donated-blood/blood-transfusions/types-of-blood-transfusions.html\"> https://www.[facility].org/donate-blood/blood-donation-process/what-happens-to-donated-blood/blood-transfusions/risks-complications.html\">   Blood Transfusion, Adult, Care After  This sheet gives you information about how to care for yourself after your procedure. Your health care provider may also give you more specific instructions. If you have problems or questions, contact your health care provider.  What can I expect after the procedure?  After the procedure, it is common to have:  · Bruising and soreness where the IV was inserted.  · A fever or chills on the day of the procedure. This may be your body's response to the new blood cells received.  · A headache.  Follow these instructions at home:  IV insertion site care         · Follow instructions from your health care provider about how to take care of your IV insertion site. Make sure you:  ? Wash your hands with soap and water before and after you change your bandage (dressing). If soap and water are not available, use hand .  ? Change your dressing as told by your health care provider.  · Check your IV insertion site every day for signs of infection. Check for:  ? Redness, swelling, or pain.  ? Bleeding from the site.  ? Warmth.  ? Pus or a bad smell.  General instructions  · Take over-the-counter and prescription medicines only as told by your health care provider.  · Rest as told by your health care provider.  · Return to your normal activities " as told by your health care provider.  · Keep all follow-up visits as told by your health care provider. This is important.  Contact a health care provider if:  · You have itching or red, swollen areas of skin (hives).  · You feel anxious.  · You feel weak after doing your normal activities.  · You have redness, swelling, warmth, or pain around the IV insertion site.  · You have blood coming from the IV insertion site that does not stop with pressure.  · You have pus or a bad smell coming from your IV insertion site.  Get help right away if:  · You have symptoms of a serious allergic or immune system reaction, including:  ? Trouble breathing or shortness of breath.  ? Swelling of the face or feeling flushed.  ? Fever or chills.  ? Pain in the head, back, or chest.  ? Dark urine or blood in the urine.  ? Widespread rash.  ? Fast heartbeat.  ? Feeling dizzy or light-headed.  If you receive your blood transfusion in an outpatient setting, you will be told whom to contact to report any reactions.  These symptoms may represent a serious problem that is an emergency. Do not wait to see if the symptoms will go away. Get medical help right away. Call your local emergency services (911 in the [place] Do not drive yourself to the hospital.  Summary  · Bruising and tenderness around the IV insertion site are common.  · Check your IV insertion site every day for signs of infection.  · Rest as told by your health care provider. Return to your normal activities as told by your health care provider.  · Get help right away for symptoms of a serious allergic or immune system reaction to blood transfusion.  This information is not intended to replace advice given to you by your health care provider. Make sure you discuss any questions you have with your health care provider.  Document Released: 01/08/2016 Document Revised: 06/11/2020 Document Reviewed: 06/11/2020  Elsevier Patient Education © 2020 Elsevier  "Inc.    https://www.[facility].org/donate-blood/blood-donation-process/what-happens-to-donated-blood/blood-transfusions/types-of-blood-transfusions.html\"> https://www.hematology.org/education/patients/blood-basics/blood-safety-and-matching\"> https://www.nhlbi.nih.gov/health-topics/blood-transfusion\">   Blood Transfusion, Adult  A blood transfusion is a procedure in which you receive blood or a type of blood cell (blood component) through an IV. You may need a blood transfusion when your blood level is low. This may result from a bleeding disorder, illness, injury, or surgery. The blood may come from a donor. You may also be able to donate blood for yourself (autologous blood donation) before a planned surgery.  The blood given in a transfusion is made up of different blood components. You may receive:  · Red blood cells. These carry oxygen to the cells in the body.  · Platelets. These help your blood to clot.  · Plasma. This is the liquid part of your blood. It carries proteins and other substances throughout the body.  · White blood cells. These help you fight infections.  If you have hemophilia or another clotting disorder, you may also receive other types of blood products.  Tell a health care provider about:  · Any blood disorders you have.  · Any previous reactions you have had during a blood transfusion.  · Any allergies you have.  · All medicines you are taking, including vitamins, herbs, eye drops, creams, and over-the-counter medicines.  · Any surgeries you have had.  · Any medical conditions you have, including any recent fever or cold symptoms.  · Whether you are pregnant or may be pregnant.  What are the risks?  Generally, this is a safe procedure. However, problems may occur.  · The most common problems include:  ? A mild allergic reaction, such as red, swollen areas of skin (hives) and itching.  ? Fever or chills. This may be the body's response to new blood cells received. This may occur during or " up to 4 hours after the transfusion.  · More serious problems may include:  ? Transfusion-associated circulatory overload (TACO), or too much fluid in the lungs. This may cause breathing problems.  ? A serious allergic reaction, such as difficulty breathing or swelling around the face and lips.  ? Transfusion-related acute lung injury (TRALI), which causes breathing difficulty and low oxygen in the blood. This can occur within hours of the transfusion or several days later.  ? Iron overload. This can happen after receiving many blood transfusions over a period of time.  ? Infection or virus being transmitted. This is rare because donated blood is carefully tested before it is given.  ? Hemolytic transfusion reaction. This is rare. It happens when your body's defense system (immune system)tries to attack the new blood cells. Symptoms may include fever, chills, nausea, low blood pressure, and low back or chest pain.  ? Transfusion-associated dbqtq-nrfbcd-nfan disease (TAGVHD). This is rare. It happens when donated cells attack your body's healthy tissues.  What happens before the procedure?  Medicines  Ask your health care provider about:  · Changing or stopping your regular medicines. This is especially important if you are taking diabetes medicines or blood thinners.  · Taking medicines such as aspirin and ibuprofen. These medicines can thin your blood. Do not take these medicines unless your health care provider tells you to take them.  · Taking over-the-counter medicines, vitamins, herbs, and supplements.  General instructions  · Follow instructions from your health care provider about eating and drinking restrictions.  · You will have a blood test to determine your blood type. This is necessary to know what kind of blood your body will accept and to match it to the donor blood.  · If you are going to have a planned surgery, you may be able to do an autologous blood donation. This may be done in case you need to  have a transfusion.  · You will have your temperature, blood pressure, and pulse monitored before the transfusion.  · If you have had an allergic reaction to a transfusion in the past, you may be given medicine to help prevent a reaction. This medicine may be given to you by mouth (orally) or through an IV.  · Set aside time for the blood transfusion. This procedure generally takes 1-4 hours to complete.  What happens during the procedure?    · An IV will be inserted into one of your veins.  · The bag of donated blood will be attached to your IV. The blood will then enter through your vein.  · Your temperature, blood pressure, and pulse will be monitored regularly during the transfusion. This monitoring is done to detect early signs of a transfusion reaction.  · Tell your nurse right away if you have any of these symptoms during the transfusion:  ? Shortness of breath or trouble breathing.  ? Chest or back pain.  ? Fever or chills.  ? Hives or itching.  · If you have any signs or symptoms of a reaction, your transfusion will be stopped and you may be given medicine.  · When the transfusion is complete, your IV will be removed.  · Pressure may be applied to the IV site for a few minutes.  · A bandage (dressing)will be applied.  The procedure may vary among health care providers and hospitals.  What happens after the procedure?  · Your temperature, blood pressure, pulse, breathing rate, and blood oxygen level will be monitored until you leave the hospital or clinic.  · Your blood may be tested to see how you are responding to the transfusion.  · You may be warmed with fluids or blankets to maintain a normal body temperature.  · If you receive your blood transfusion in an outpatient setting, you will be told whom to contact to report any reactions.  Where to find more information  For more information on blood transfusions, visit the [employer]: [facility]  Summary  · A blood transfusion is a procedure in  which you receive blood or a type of blood cell (blood component) through an IV.  · The blood you receive may come from a donor or be donated by yourself (autologous blood donation) before a planned surgery.  · The blood given in a transfusion is made up of different blood components. You may receive red blood cells, platelets, plasma, or white blood cells depending on the condition treated.  · Your temperature, blood pressure, and pulse will be monitored before, during, and after the transfusion.  · After the transfusion, your blood may be tested to see how your body has responded.  This information is not intended to replace advice given to you by your health care provider. Make sure you discuss any questions you have with your health care provider.  Document Released: 12/15/2001 Document Revised: 06/11/2020 Document Reviewed: 06/11/2020  Elsevier Patient Education © 2020 Elsevier Inc.

## 2020-10-08 ENCOUNTER — HOSPITAL ENCOUNTER (OUTPATIENT)
Dept: INFUSION THERAPY | Facility: HOSPITAL | Age: 80
Discharge: HOME | End: 2020-10-08
Admitting: INTERNAL MEDICINE

## 2020-10-08 VITALS
OXYGEN SATURATION: 97 % | SYSTOLIC BLOOD PRESSURE: 140 MMHG | HEART RATE: 80 BPM | DIASTOLIC BLOOD PRESSURE: 68 MMHG | RESPIRATION RATE: 16 BRPM | TEMPERATURE: 97.8 F

## 2020-10-08 DIAGNOSIS — D64.9 ANEMIA REQUIRING TRANSFUSIONS: ICD-10-CM

## 2020-10-08 DIAGNOSIS — N18.30 CKD STAGE 3 DUE TO TYPE 2 DIABETES MELLITUS (HCC): ICD-10-CM

## 2020-10-08 DIAGNOSIS — E11.22 CKD STAGE 3 DUE TO TYPE 2 DIABETES MELLITUS (HCC): ICD-10-CM

## 2020-10-08 PROCEDURE — A9270 NON-COVERED ITEM OR SERVICE: HCPCS | Performed by: INTERNAL MEDICINE

## 2020-10-08 PROCEDURE — 36430 TRANSFUSION BLD/BLD COMPNT: CPT

## 2020-10-08 PROCEDURE — 63710000001 DIPHENHYDRAMINE PER 50 MG: Performed by: INTERNAL MEDICINE

## 2020-10-08 PROCEDURE — 86900 BLOOD TYPING SEROLOGIC ABO: CPT

## 2020-10-08 PROCEDURE — P9016 RBC LEUKOCYTES REDUCED: HCPCS

## 2020-10-08 PROCEDURE — 63710000001 ACETAMINOPHEN 325 MG TABLET: Performed by: INTERNAL MEDICINE

## 2020-10-08 PROCEDURE — 96374 THER/PROPH/DIAG INJ IV PUSH: CPT

## 2020-10-08 PROCEDURE — 25010000002 FUROSEMIDE PER 20 MG: Performed by: INTERNAL MEDICINE

## 2020-10-08 RX ORDER — ACETAMINOPHEN 325 MG/1
650 TABLET ORAL ONCE
Status: COMPLETED | OUTPATIENT
Start: 2020-10-08 | End: 2020-10-08

## 2020-10-08 RX ORDER — DIPHENHYDRAMINE HCL 25 MG
25 CAPSULE ORAL ONCE
Status: COMPLETED | OUTPATIENT
Start: 2020-10-08 | End: 2020-10-08

## 2020-10-08 RX ORDER — FUROSEMIDE 10 MG/ML
20 INJECTION INTRAMUSCULAR; INTRAVENOUS ONCE
Status: COMPLETED | OUTPATIENT
Start: 2020-10-08 | End: 2020-10-08

## 2020-10-08 RX ORDER — SODIUM CHLORIDE 9 MG/ML
250 INJECTION, SOLUTION INTRAVENOUS AS NEEDED
Status: DISCONTINUED | OUTPATIENT
Start: 2020-10-08 | End: 2020-10-10 | Stop reason: HOSPADM

## 2020-10-08 RX ORDER — SODIUM CHLORIDE 9 MG/ML
INJECTION, SOLUTION INTRAVENOUS
Status: COMPLETED
Start: 2020-10-08 | End: 2020-10-08

## 2020-10-08 RX ADMIN — SODIUM CHLORIDE 250 ML: 9 INJECTION, SOLUTION INTRAVENOUS at 12:19

## 2020-10-08 RX ADMIN — ACETAMINOPHEN 650 MG: 325 TABLET, FILM COATED ORAL at 09:28

## 2020-10-08 RX ADMIN — DIPHENHYDRAMINE HYDROCHLORIDE 25 MG: 25 CAPSULE ORAL at 09:28

## 2020-10-08 RX ADMIN — FUROSEMIDE 20 MG: 10 INJECTION, SOLUTION INTRAMUSCULAR; INTRAVENOUS at 12:30

## 2020-10-08 NOTE — NURSING NOTE
Patient arrived to [facility] via wheelchair at 0855. IV placed and two units of PRBC's administered as ordered without incident.  AVS discussed and copy given to patient.  Discharged from [facility] at 1543 via wheelchair in stable conditions.

## 2020-10-09 LAB
BH BB BLOOD EXPIRATION DATE: NORMAL
BH BB BLOOD EXPIRATION DATE: NORMAL
BH BB BLOOD TYPE BARCODE: 1700
BH BB BLOOD TYPE BARCODE: 7300
BH BB DISPENSE STATUS: NORMAL
BH BB DISPENSE STATUS: NORMAL
BH BB PRODUCT CODE: NORMAL
BH BB PRODUCT CODE: NORMAL
BH BB UNIT NUMBER: NORMAL
BH BB UNIT NUMBER: NORMAL
CROSSMATCH INTERPRETATION: NORMAL
CROSSMATCH INTERPRETATION: NORMAL
UNIT  ABO: NORMAL
UNIT  ABO: NORMAL
UNIT  RH: NORMAL
UNIT  RH: NORMAL

## 2020-10-09 RX ORDER — MIDODRINE HYDROCHLORIDE 5 MG/1
TABLET ORAL
Qty: 90 TABLET | Refills: 0 | Status: SHIPPED | OUTPATIENT
Start: 2020-10-09 | End: 2020-12-23 | Stop reason: DRUGHIGH

## 2020-10-10 DIAGNOSIS — E11.9 TYPE 2 DIABETES MELLITUS WITHOUT COMPLICATIONS (HCC): ICD-10-CM

## 2020-10-10 DIAGNOSIS — F41.9 ANXIETY DISORDER, UNSPECIFIED: ICD-10-CM

## 2020-10-14 ENCOUNTER — INFUSION (OUTPATIENT)
Dept: ONCOLOGY | Facility: HOSPITAL | Age: 80
End: 2020-10-14

## 2020-10-14 ENCOUNTER — OFFICE VISIT (OUTPATIENT)
Dept: ONCOLOGY | Facility: CLINIC | Age: 80
End: 2020-10-14

## 2020-10-14 ENCOUNTER — LAB (OUTPATIENT)
Dept: LAB | Facility: HOSPITAL | Age: 80
End: 2020-10-14

## 2020-10-14 VITALS
WEIGHT: 198.2 LBS | RESPIRATION RATE: 18 BRPM | OXYGEN SATURATION: 97 % | DIASTOLIC BLOOD PRESSURE: 68 MMHG | TEMPERATURE: 98.9 F | SYSTOLIC BLOOD PRESSURE: 105 MMHG | BODY MASS INDEX: 37.45 KG/M2 | HEART RATE: 78 BPM

## 2020-10-14 DIAGNOSIS — E11.22 CKD STAGE 3 DUE TO TYPE 2 DIABETES MELLITUS (HCC): ICD-10-CM

## 2020-10-14 DIAGNOSIS — N18.30 CKD STAGE 3 DUE TO TYPE 2 DIABETES MELLITUS (HCC): ICD-10-CM

## 2020-10-14 DIAGNOSIS — D46.C MYELODYSPLASTIC SYNDROME WITH 5Q DELETION (HCC): ICD-10-CM

## 2020-10-14 DIAGNOSIS — D64.9 ANEMIA REQUIRING TRANSFUSIONS: ICD-10-CM

## 2020-10-14 DIAGNOSIS — D64.9 ANEMIA REQUIRING TRANSFUSIONS: Primary | ICD-10-CM

## 2020-10-14 LAB
BASOPHILS # BLD AUTO: 0.14 10*3/MM3 (ref 0–0.2)
BASOPHILS NFR BLD AUTO: 2.8 % (ref 0–1.5)
DEPRECATED RDW RBC AUTO: 55.1 FL (ref 37–54)
EOSINOPHIL # BLD AUTO: 0.49 10*3/MM3 (ref 0–0.4)
EOSINOPHIL NFR BLD AUTO: 9.7 % (ref 0.3–6.2)
ERYTHROCYTE [DISTWIDTH] IN BLOOD BY AUTOMATED COUNT: 16.6 % (ref 12.3–15.4)
HCT VFR BLD AUTO: 30 % (ref 34–46.6)
HGB BLD-MCNC: 9.4 G/DL (ref 12–15.9)
IMM GRANULOCYTES # BLD AUTO: 0.05 10*3/MM3 (ref 0–0.05)
IMM GRANULOCYTES NFR BLD AUTO: 1 % (ref 0–0.5)
LYMPHOCYTES # BLD AUTO: 1.72 10*3/MM3 (ref 0.7–3.1)
LYMPHOCYTES NFR BLD AUTO: 33.9 % (ref 19.6–45.3)
MCH RBC QN AUTO: 29.6 PG (ref 26.6–33)
MCHC RBC AUTO-ENTMCNC: 31.3 G/DL (ref 31.5–35.7)
MCV RBC AUTO: 94.3 FL (ref 79–97)
MONOCYTES # BLD AUTO: 0.14 10*3/MM3 (ref 0.1–0.9)
MONOCYTES NFR BLD AUTO: 2.8 % (ref 5–12)
NEUTROPHILS NFR BLD AUTO: 2.53 10*3/MM3 (ref 1.7–7)
NEUTROPHILS NFR BLD AUTO: 49.8 % (ref 42.7–76)
PLATELET # BLD AUTO: 329 10*3/MM3 (ref 140–450)
PMV BLD AUTO: 12.4 FL (ref 6–12)
RBC # BLD AUTO: 3.18 10*6/MM3 (ref 3.77–5.28)
WBC # BLD AUTO: 5.07 10*3/MM3 (ref 3.4–10.8)

## 2020-10-14 PROCEDURE — 25010000002 EPOETIN ALFA PER 1000 UNITS: Performed by: INTERNAL MEDICINE

## 2020-10-14 PROCEDURE — 99214 OFFICE O/P EST MOD 30 MIN: CPT | Performed by: NURSE PRACTITIONER

## 2020-10-14 PROCEDURE — 96372 THER/PROPH/DIAG INJ SC/IM: CPT

## 2020-10-14 PROCEDURE — 85025 COMPLETE CBC W/AUTO DIFF WBC: CPT | Performed by: INTERNAL MEDICINE

## 2020-10-14 PROCEDURE — 36415 COLL VENOUS BLD VENIPUNCTURE: CPT

## 2020-10-14 RX ADMIN — ERYTHROPOIETIN 60000 UNITS: 40000 INJECTION, SOLUTION INTRAVENOUS; SUBCUTANEOUS at 10:00

## 2020-10-14 NOTE — PROGRESS NOTES
Subjective     REASON FOR FOLLOW-UP:   1.  Macrocytic anemia secondary to myelodysplastic syndrome with 5 q. minus and chronic kidney disease  2.  Monoclonal gammopathy of undetermined significance                             REQUESTING PHYSICIAN:  Dr. Baugh      History of Present Illness   Ms. Singh is a nikita 80-year-old woman returning today for follow-up of her myelodysplastic syndrome with anemia.  She recently initiated Revlimid 2.5 mg daily, however, did report a pruritic rash and was instructed to hold Revlimid for a couple of days.  She took Benadryl and applied hydrocortisone with improvement.  She therefore restarted at 2.5 mg every other day and reports almost complete resolution of the rash.    Her CBC is stable and hemoglobin improved today at 9.4, this is reflective of 2 units packed red blood cells on 10/9/2020.    She reports feeling well generally.  Her appetite is good.  Her vital signs are stable.    Hematology History:  Patient presented as 78-year-old woman for evaluation of anemia.  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  She has stage III chronic kidney disease followed by Dr. Garza of nephrology.  Reviewing her records, the patient has had anemia present since at least 2014 but her hemoglobin has worsened over the past 6 months.  The patient was admitted to the hospital in October 2018 with symptomatic anemia, shortness of breath and lightheadedness.  She was found to have hemoglobin of 7.0.  There was concern for GI blood loss although EGD and colonoscopy performed showed no obvious etiology of blood loss.  The patient states that she was transfused 7 units of packed red blood cells during the hospital stay.  I do not see any Hemoccult results.  She was previously on Eliquis which was discontinued.  Since discharge in October, the patient has been receiving weekly Procrit 20,000 units in the [facility] at [place], but despite Procrit she has required transfusion on 2 separate occasions.  She is not seeing any bright red blood per rectum or melena.  She complains of fatigue and lightheadedness.    Recent iron profile on 1/17/19 was inconsistent with iron deficiency with an iron saturation 68% and the ferritin was 352.  The red blood cells are macrocytic.  White blood cell and platelet counts have been normal.    The patient was seen in hematology on 1/29/19 and additional evaluation performed.  The reticulocyte count was elevated 3.72% but the haptoglobin and LDH were both normal arguing against a hemolytic process.  B12 and folic acid levels were normal.  Serum protein electrophoresis showed no M spike but the immunofixation identified a small IgA monoclonal protein with lambda specificity; IgA level 544.  Sedimentation rate elevated 58.  A free light chain ratio from the serum was normal 1.64.    The patient was referred for a bone marrow exam performed on 3/6/2019 which showed a cellularity of 35%.  There was erythroid hyperplasia with megaloblastoid changes, normal myeloid maturation, increased megakaryocytes with frequent micro megakaryocytes, scattered lymphoid aggregates.  There were morphologically normal plasma cells increased in #2 8% of the cellularity.  There were no increased blasts.  No increase in iron stores.  Karyotype showed a deletion 5 q. and 19 of 20 cells analyzed.        Past Medical History:   Diagnosis Date   • Allergic rhinitis    • Anemia    • Anxiety    • Appetite absent    • Arthritis    • Asthma    • Back pain    • Bell's palsy    • Black tarry stools    • Blood in stool    • Chronic fatigue    • CKD (chronic kidney disease) stage 3, GFR 30-59 ml/min    • Community acquired pneumonia of left lung 10/25/2018   • Cough    • Depression    • Diabetes mellitus (CMS/HCC)     LAST A1C 6   • Diabetic gastroparesis (CMS/HCC) 2/19/2016   • Difficulty walking    • Excessive urination at night    •  Frequent urination    • GERD (gastroesophageal reflux disease)    • GI bleed    • Gout    • H/O blood clots     LEFT LEG 7 OR 8 YEARS AGO   • Heat intolerance    • History of fall 10/2018   • History of prior pregnancies     x8, miscarriage 5   • History of transfusion 11/2018    due to anemia   • Hyperlipidemia    • Hypertension    • Hypothyroidism    • Normal coronary arteries     by cath 2013   • Orthostatic hypotension    • AARON (obstructive sleep apnea)     DOESNT WEAR REGULARLY   • PONV (postoperative nausea and vomiting)    • Skin cancer    • Stroke (CMS/HCC)     Several mini-strokes   • TIA (transient ischemic attack)     LAST TIA JULY 2017   • Urination pain    • UTI (urinary tract infection)     Dec 2018 and Jan 2019        Past Surgical History:   Procedure Laterality Date   • BACK SURGERY      HARDWARE   • CHOLECYSTECTOMY      OPEN   • COLONOSCOPY  2011    due for repeat in 2021   • COLONOSCOPY N/A 10/28/2018    Procedure: COLONOSCOPY;  Surgeon: Emmanuel Rogers MD;  Location: [facility] OR;  Service: Gastroenterology   • ENDOSCOPY N/A 10/26/2018    Procedure: ESOPHAGOGASTRODUODENOSCOPY;  Surgeon: Emmanuel Rogers MD;  Location: [facility] OR;  Service: Gastroenterology   • HYSTERECTOMY      PARTIAL    • KNEE SURGERY     • NECK SURGERY     • SPINE SURGERY     • TUMOR REMOVAL Left     Leg   • UPPER GASTROINTESTINAL ENDOSCOPY  2014    gastritis.  done by dr. hastings        Current Outpatient Medications on File Prior to Visit   Medication Sig Dispense Refill   • ACCU-CHEK FASTCLIX LANCETS misc TEST 3-4 TIMES DAILY AS DIRECTED 400 each 3   • ACCU-CHEK SMARTVIEW test strip TEST BLOOD SUGAR THREE TIMES DAILY OR AS DIRECTED 300 each 3   • acetaminophen (TYLENOL) 325 MG tablet Take 2 tablets by mouth Every 6 (Six) Hours As Needed for Mild Pain . OTC product 40 tablet 0   • albuterol sulfate  (90 Base) MCG/ACT inhaler Inhale 2 puffs Every 4 (Four) Hours As Needed for Wheezing. 1 inhaler 3   •  "atorvastatin (LIPITOR) 10 MG tablet TAKE ONE TABLET BY MOUTH AT BEDTIME 90 tablet 1   • Blood Glucose Monitoring Suppl (ACCU-CHEK KENNETH SMARTVIEW) w/Device kit TEST blood sugar three times daily or as directed 1 kit 0   • desvenlafaxine (PRISTIQ) 50 MG 24 hr tablet TAKE ONE TABLET BY MOUTH EVERY DAY 90 tablet 1   • diphenhydrAMINE (BENADRYL) 25 mg capsule Take 25 mg by mouth Every 6 (Six) Hours As Needed for Itching.     • ELIQUIS 5 MG tablet tablet TAKE ONE TABLET BY MOUTH TWICE DAILY 180 tablet 0   • fluconazole (DIFLUCAN) 150 MG tablet TAKE ONE TABLET BY MOUTH ONCE FOR ONE DOSE     • furosemide (LASIX) 20 MG tablet TAKE ONE (1) TABLET ORALLY (BY MOUTH) ONCE DAILY 90 tablet 1   • gabapentin (NEURONTIN) 400 MG capsule Take 1 capsule by mouth Every 8 (Eight) Hours for 2 days. 6 capsule 0   • gabapentin (NEURONTIN) 400 MG capsule TAKE ONE CAPSULE BY MOUTH EVERY MORNING, AT NOON, AND TWO AT BEDTIME 120 capsule 2   • HYDROcodone-acetaminophen (Norco) 5-325 MG per tablet Take 1 tablet by mouth Every 12 (Twelve) Hours As Needed for Severe Pain . 30 tablet 0   • insulin aspart (novoLOG) 100 UNIT/ML injection Inject 5 Units under the skin into the appropriate area as directed 3 (Three) Times a Day With Meals.  12   • lenalidomide (REVLIMID) 2.5 MG capsule Take 1 capsule by mouth Daily. NO BREAKS 28 capsule 0   • levoFLOXacin (LEVAQUIN) 500 MG tablet Take 500 mg by mouth Daily.     • levothyroxine (SYNTHROID, LEVOTHROID) 88 MCG tablet TAKE ONE TABLET BY MOUTH EVERY DAY 90 tablet 1   • midodrine (PROAMATINE) 5 MG tablet TAKE ONE TABLET BY MOUTH THREE TIMES DAILY BEFORE MEALS 90 tablet 0   • Mirabegron ER (Myrbetriq) 25 MG tablet sustained-release 24 hour 24 hr tablet Take 25 mg by mouth Daily.     • Needle, Disp, (BD DISP NEEDLES) 30G X 1/2\" misc To be used 3 times daily with Novolog Flexpen. 100 each 5   • nitrofurantoin, macrocrystal-monohydrate, (MACROBID) 100 MG capsule Take 100 mg by mouth 2 (Two) Times a Day.     • " nystatin (MYCOSTATIN) 521503 UNIT/GM powder Apply  topically to the appropriate area as directed 2 (Two) Times a Day. 60 g 2   • O2 (OXYGEN) Inhale 2 L/min Every Night. Uses 2L  overnight only     • omeprazole (priLOSEC) 40 MG capsule TAKE ONE CAPSULE BY MOUTH EVERY DAY 90 capsule 1   • ondansetron (ZOFRAN) 8 MG tablet Take 1 tablet by mouth 3 (Three) Times a Day As Needed for Nausea or Vomiting. 30 tablet 5   • opium-belladonna (B&O SUPPRETTES) 16.2-30 MG suppository Insert 1 suppository into the rectum Every 8 (Eight) Hours As Needed for Bladder Spasms. 4 suppository 0   • phenazopyridine (PYRIDIUM) 200 MG tablet Take 200 mg by mouth 3 (Three) Times a Day.     • potassium chloride 10 MEQ CR tablet TAKE ONE TABLET BY MOUTH EVERY DAY 90 tablet 2   • TRESIBA FLEXTOUCH 200 UNIT/ML solution pen-injector pen injection INJECT 54 UNITS subcutaneously AT BEDTIME 9 mL 11   • UltiCare Mini Pen Needles 31G X 6 MM misc USE TO inject insulins FOUR TIMES DAILY AS DIRECTED 400 each 3     Current Facility-Administered Medications on File Prior to Visit   Medication Dose Route Frequency Provider Last Rate Last Dose   • acetaminophen (TYLENOL) tablet 650 mg  650 mg Oral Q6H PRN Elieser Headley MD       • diphenhydrAMINE (BENADRYL) capsule 25 mg  25 mg Oral Once Elieser Headley MD            ALLERGIES:    Allergies   Allergen Reactions   • Baclofen Anxiety     Panic attack, nightmares   • Codeine Itching and Rash   • Lisinopril Cough   • Morphine Hives   • Penicillins Rash     Tolerates cephalosporins         Social History     Socioeconomic History   • Marital status:      Spouse name: Not on file   • Number of children: 3   • Years of education: High School   • Highest education level: Not on file   Occupational History     Employer: RETIRED   Social Needs   • Financial resource strain: Not hard at all   • Food insecurity     Worry: Never true     Inability: Never true   • Transportation needs     Medical: No      Non-medical: No   Tobacco Use   • Smoking status: Never Smoker   • Smokeless tobacco: Never Used   • Tobacco comment: CAFFEINE USE: NONE   Substance and Sexual Activity   • Alcohol use: No   • Drug use: No   • Sexual activity: Defer     Comment: EXERCISE - RARELY        Family History   Problem Relation Age of Onset   • Lupus Mother    • Heart failure Mother 59   • Heart disease Other    • Hypertension Other    • Heart attack Father    • Breast cancer Neg Hx         Review of Systems   Constitutional: Positive for fatigue (improved). Negative for appetite change, fever and unexpected weight change.   HENT: Negative for congestion.    Respiratory: Positive for shortness of breath (with exertion). Negative for apnea and cough.    Gastrointestinal: Negative for abdominal pain, blood in stool, nausea and vomiting.   Genitourinary: Positive for dysuria. Negative for frequency and urgency.   Musculoskeletal: Positive for arthralgias (stable), back pain (stable) and gait problem (stable). Negative for neck stiffness.   Skin: Negative.    Neurological: Positive for numbness. Negative for dizziness, tremors, speech difficulty and light-headedness.   Hematological: Negative.    Psychiatric/Behavioral: Negative.        Objective     Vitals:    10/14/20 0926   BP: 105/68   Pulse: 78   Resp: 18   Temp: 98.9 °F (37.2 °C)   SpO2: 97%   Weight: 89.9 kg (198 lb 3.2 oz)   PainSc: 0-No pain     Current Status 10/14/2020   ECOG score 2       Physical Exam    CON: pleasant well-developed somewhat chronic ill appearing woman, she is wearing a facemask.  She is seated in a wheelchair.  She is in no acute distress.  HEENT: no icterus, no thrush, moist membranes  NECK: no jvd  LYMPH: No cervical  lymphadenopathy  CV: RRR, S1S2, no murmur  RESP: Lungs clear to auscultation bilaterally, no wheezing noted.  MUSC: No edema noted.  Poor mobility, and a wheelchair  NEURO: alert and oriented x3, mild global weakness  PSYCH: normal mood and  affect      RECENT LABS:  Hematology WBC   Date Value Ref Range Status   10/07/2020 7.35 3.40 - 10.80 10*3/mm3 Final   04/23/2019 5.14 3.40 - 10.80 10*3/mm3 Final     RBC   Date Value Ref Range Status   10/07/2020 2.45 (L) 3.77 - 5.28 10*6/mm3 Final   04/23/2019 3.02 (L) 3.77 - 5.28 10*6/mm3 Final     Hemoglobin   Date Value Ref Range Status   10/07/2020 7.5 (L) 12.0 - 15.9 g/dL Final     Hematocrit   Date Value Ref Range Status   10/07/2020 23.3 (L) 34.0 - 46.6 % Final     Platelets   Date Value Ref Range Status   10/07/2020 532 (H) 140 - 450 10*3/mm3 Final      M spike/RANJANA asymmetrical gamma IgA lambda; repeat pending today  Free light chain ratio 1.7; repeat pending today  Ferritin 1292, iron saturation unable to calculate    Assessment/Plan     1.  Macrocytic anemia, worse:  BM biopsy 3/5/19 c/w myelodysplastic syndrome with deletion of 5 q.  In addition, the patient has chronic kidney disease, stage III.  She is currently doing okay on weekly Procrit with transfusion support required more frequently.  Her last transfusion was 10/9/2020.  Her hemoglobin has improved at 9.4 today.    We will continue weekly CBC and Procrit.  She requires transfusion for hemoglobin 8.0 or less and receives Lasix between units of blood.      She recently started Revlimid 2.5 mg daily, however, did experience a pruritic rash and therefore held for several days.  She took Benadryl and applied hydrocortisone with almost complete resolution.  She is now taking 2.5 mg every other day.    2.  Monoclonal gammopathy of undetermined significance: The patient's bone marrow showed slight increase in plasma cells 8% of the cellularity but normal in morphology.  She has an IgA monoclonality on her immunofixation but no measurable M spike and a normal free light chain ratio.   Repeat studies 5/16/2019 showed a stable IgA 509 with a normal light chain ratio, no M spike.  Studies performed 10/24/2019 show stable IgA at 491, no M spike with mildly  increased kappa/lambda light chain ratio related to her CKD.  Repeat studies 9/16/2020 stable       3.  Chronic kidney disease, stage III which contributes to anemia    4.  Probable transfusional iron overload.  The patient has poor performance status but low-grade MDS and may benefit from iron chelation but complicated by her CKD.  Ferritin and iron profile today show a ferritin 1292 and iron saturation unable to be detected.    PLAN:  1.  We will proceed with Procrit as scheduled today.  She will continue to return weekly for CBC, RN review and possible Procrit.    2.  Will return in 1 month for MD/NP visit.    3.  We discussed Revlimid in detail today.  She had multiple questions as to why she was taking this medication.  I have asked that she increase her dose to 2.5 mg daily, should she experience a skin rash again she will let us know.  We will keep a close eye on this.

## 2020-10-15 ENCOUNTER — MEDICATION THERAPY MANAGEMENT (OUTPATIENT)
Dept: PHARMACY | Facility: HOSPITAL | Age: 80
End: 2020-10-15

## 2020-10-15 ENCOUNTER — PATIENT OUTREACH (OUTPATIENT)
Dept: CASE MANAGEMENT | Facility: OTHER | Age: 80
End: 2020-10-15

## 2020-10-15 ENCOUNTER — TELEPHONE (OUTPATIENT)
Dept: ONCOLOGY | Facility: CLINIC | Age: 80
End: 2020-10-15

## 2020-10-15 RX ORDER — LENALIDOMIDE 2.5 MG/1
CAPSULE ORAL
Qty: 28 CAPSULE | Refills: 0 | Status: SHIPPED | OUTPATIENT
Start: 2020-10-15 | End: 2020-11-11

## 2020-10-15 NOTE — TELEPHONE ENCOUNTER
Revlimid refill request rec electronically from [facility]. Pts schedule was changed to every other day on 10/7/2020 due to rash. She was reevaluated yesterday and her schedule was moved back to daily dosing. Her first delivery was on 9/24/20    I have routed the Rx to Dr Headley for signature. Once signed this will be escribed to [facility].    Confirmation rec that Dr Headley approved and signed the Revlimid rx. This was escribed to [facility]

## 2020-10-15 NOTE — PROGRESS NOTES
[facility] Lab Review- Revlimid      10/14/2020   WBC 3.40 - 10.80 10*3/mm3 5.07   Neutrophils Absolute 1.70 - 7.00 10*3/mm3 2.53   Hemoglobin 12.0 - 15.9 g/dL 9.4 (A)   Hematocrit 34.0 - 46.6 % 30.0 (A)   Platelets 140 - 450 10*3/mm3 329     Per APRN dictation, Joya has been changed to an every other day dosing schedule due to rash. Pharmacy will continue to follow.     Thanks,   Mulu Corral, PharmD

## 2020-10-15 NOTE — OUTREACH NOTE
Patient Outreach Note  Incoming call from patient discussing recent appointments.Patient reports episodes of burning; discomfort when voiding and discusses testing done for evaluation by urology. Confirms 10/24/20 urology appointment with Dr.Christopher Fall. Confirms 10/14/20 appointment with hematology/oncology; verbalizes understanding of recommendations. Patient states to continue with home health services. She reports no difficulty with chest pain;SOB; appetite or sleeping. She is compliant with medications; use of O2; and monitoring blood sugars (WNL's). Reviewed with patient medical appointments; recommendations; medications; home health services and patient concerns. Patient states to appreciate outreach. No further questions or concerns voiced at this time.     Rebecca Will RN  Ambulatory     10/15/2020, 09:48 EDT

## 2020-10-20 ENCOUNTER — MEDICATION THERAPY MANAGEMENT (OUTPATIENT)
Dept: PHARMACY | Facility: HOSPITAL | Age: 80
End: 2020-10-20

## 2020-10-20 NOTE — PROGRESS NOTES
MTM telephone follow-up for SE/adherence - Revlimid    Ms. Singh was interviewed today via telephone regarding her Revlimid therapy. Pt states she is doing well this morning, but mentions she had some nausea this morning that has resolved with Zofran. Pt states that her nausea occurs frequently, but began before she started her Revlimid, and is well-controlled with Zofran. She takes a scheduled dose of Zofran before her nightly Revlimid and this helps prevent nausea. Pt also describes some dizziness, however she believes this is associated with anemia rather than the Revlimid. Regarding her previous skin rash, pt says this has almost completely cleared and has had no skin issues since the dose reduction to every other day. Pt also complains of UTIs - she is currently being evaluated for recurrent UTIs.     Adherence was assessed - pt denies missing any doses, describes a good support system with her son, and states she utilizes a pill planner/takes her doses at 7pm every day. The pt had no other questions or concerns, so I thanked her for her time and concluded the interview.    Benja Jamison  Pharmacist Intern

## 2020-10-21 ENCOUNTER — LAB (OUTPATIENT)
Dept: LAB | Facility: HOSPITAL | Age: 80
End: 2020-10-21

## 2020-10-21 ENCOUNTER — INFUSION (OUTPATIENT)
Dept: ONCOLOGY | Facility: HOSPITAL | Age: 80
End: 2020-10-21

## 2020-10-21 VITALS
DIASTOLIC BLOOD PRESSURE: 53 MMHG | HEART RATE: 80 BPM | OXYGEN SATURATION: 97 % | TEMPERATURE: 98 F | SYSTOLIC BLOOD PRESSURE: 113 MMHG

## 2020-10-21 DIAGNOSIS — N18.30 CKD STAGE 3 DUE TO TYPE 2 DIABETES MELLITUS (HCC): ICD-10-CM

## 2020-10-21 DIAGNOSIS — E83.111 IRON OVERLOAD, TRANSFUSIONAL: ICD-10-CM

## 2020-10-21 DIAGNOSIS — K21.9 GASTROESOPHAGEAL REFLUX DISEASE: ICD-10-CM

## 2020-10-21 DIAGNOSIS — E03.9 ACQUIRED HYPOTHYROIDISM: ICD-10-CM

## 2020-10-21 DIAGNOSIS — D47.2 MONOCLONAL GAMMOPATHY OF UNKNOWN SIGNIFICANCE (MGUS): ICD-10-CM

## 2020-10-21 DIAGNOSIS — E11.22 CKD STAGE 3 DUE TO TYPE 2 DIABETES MELLITUS (HCC): ICD-10-CM

## 2020-10-21 DIAGNOSIS — D64.9 ANEMIA REQUIRING TRANSFUSIONS: Primary | ICD-10-CM

## 2020-10-21 LAB
ABO GROUP BLD: NORMAL
ALBUMIN SERPL-MCNC: 3.8 G/DL (ref 3.5–5.2)
ALBUMIN/GLOB SERPL: 1.5 G/DL
ALP SERPL-CCNC: 129 U/L (ref 39–117)
ALT SERPL W P-5'-P-CCNC: 26 U/L (ref 1–33)
ANION GAP SERPL CALCULATED.3IONS-SCNC: 9.5 MMOL/L (ref 5–15)
AST SERPL-CCNC: 18 U/L (ref 1–32)
BASOPHILS # BLD AUTO: 0.1 10*3/MM3 (ref 0–0.2)
BASOPHILS NFR BLD AUTO: 2.2 % (ref 0–1.5)
BILIRUB SERPL-MCNC: 0.6 MG/DL (ref 0–1.2)
BLD GP AB SCN SERPL QL: NEGATIVE
BUN SERPL-MCNC: 33 MG/DL (ref 8–23)
BUN/CREAT SERPL: 14.8 (ref 7–25)
CALCIUM SPEC-SCNC: 9.1 MG/DL (ref 8.6–10.5)
CHLORIDE SERPL-SCNC: 102 MMOL/L (ref 98–107)
CO2 SERPL-SCNC: 27.5 MMOL/L (ref 22–29)
CREAT SERPL-MCNC: 2.23 MG/DL (ref 0.57–1)
DEPRECATED RDW RBC AUTO: 56.8 FL (ref 37–54)
EOSINOPHIL # BLD AUTO: 0.48 10*3/MM3 (ref 0–0.4)
EOSINOPHIL NFR BLD AUTO: 10.6 % (ref 0.3–6.2)
ERYTHROCYTE [DISTWIDTH] IN BLOOD BY AUTOMATED COUNT: 17.1 % (ref 12.3–15.4)
GFR SERPL CREATININE-BSD FRML MDRD: 21 ML/MIN/1.73
GLOBULIN UR ELPH-MCNC: 2.6 GM/DL
GLUCOSE SERPL-MCNC: 254 MG/DL (ref 65–99)
HCT VFR BLD AUTO: 26.9 % (ref 34–46.6)
HGB BLD-MCNC: 8.3 G/DL (ref 12–15.9)
IMM GRANULOCYTES # BLD AUTO: 0.07 10*3/MM3 (ref 0–0.05)
IMM GRANULOCYTES NFR BLD AUTO: 1.5 % (ref 0–0.5)
LYMPHOCYTES # BLD AUTO: 1.7 10*3/MM3 (ref 0.7–3.1)
LYMPHOCYTES NFR BLD AUTO: 37.5 % (ref 19.6–45.3)
MCH RBC QN AUTO: 29.2 PG (ref 26.6–33)
MCHC RBC AUTO-ENTMCNC: 30.9 G/DL (ref 31.5–35.7)
MCV RBC AUTO: 94.7 FL (ref 79–97)
MONOCYTES # BLD AUTO: 0.16 10*3/MM3 (ref 0.1–0.9)
MONOCYTES NFR BLD AUTO: 3.5 % (ref 5–12)
NEUTROPHILS NFR BLD AUTO: 2.02 10*3/MM3 (ref 1.7–7)
NEUTROPHILS NFR BLD AUTO: 44.7 % (ref 42.7–76)
NRBC BLD AUTO-RTO: 0 /100 WBC (ref 0–0.2)
PLATELET # BLD AUTO: 369 10*3/MM3 (ref 140–450)
PMV BLD AUTO: 13.1 FL (ref 6–12)
POTASSIUM SERPL-SCNC: 4.4 MMOL/L (ref 3.5–5.2)
PROT SERPL-MCNC: 6.4 G/DL (ref 6–8.5)
RBC # BLD AUTO: 2.84 10*6/MM3 (ref 3.77–5.28)
RH BLD: POSITIVE
SODIUM SERPL-SCNC: 139 MMOL/L (ref 136–145)
T&S EXPIRATION DATE: NORMAL
WBC # BLD AUTO: 4.53 10*3/MM3 (ref 3.4–10.8)

## 2020-10-21 PROCEDURE — 80053 COMPREHEN METABOLIC PANEL: CPT | Performed by: INTERNAL MEDICINE

## 2020-10-21 PROCEDURE — 86923 COMPATIBILITY TEST ELECTRIC: CPT

## 2020-10-21 PROCEDURE — 85025 COMPLETE CBC W/AUTO DIFF WBC: CPT | Performed by: INTERNAL MEDICINE

## 2020-10-21 PROCEDURE — 36415 COLL VENOUS BLD VENIPUNCTURE: CPT

## 2020-10-21 PROCEDURE — 86850 RBC ANTIBODY SCREEN: CPT | Performed by: NURSE PRACTITIONER

## 2020-10-21 PROCEDURE — 25010000002 EPOETIN ALFA PER 1000 UNITS: Performed by: INTERNAL MEDICINE

## 2020-10-21 PROCEDURE — 86901 BLOOD TYPING SEROLOGIC RH(D): CPT | Performed by: NURSE PRACTITIONER

## 2020-10-21 PROCEDURE — 86900 BLOOD TYPING SEROLOGIC ABO: CPT | Performed by: NURSE PRACTITIONER

## 2020-10-21 PROCEDURE — 96372 THER/PROPH/DIAG INJ SC/IM: CPT

## 2020-10-21 RX ORDER — SODIUM CHLORIDE 9 MG/ML
250 INJECTION, SOLUTION INTRAVENOUS AS NEEDED
Status: CANCELLED | OUTPATIENT
Start: 2020-10-21

## 2020-10-21 RX ORDER — APIXABAN 5 MG/1
TABLET, FILM COATED ORAL
Qty: 180 TABLET | Refills: 0 | Status: SHIPPED | OUTPATIENT
Start: 2020-10-21 | End: 2021-01-26

## 2020-10-21 RX ORDER — ACETAMINOPHEN 325 MG/1
650 TABLET ORAL ONCE
Status: CANCELLED | OUTPATIENT
Start: 2020-10-21 | End: 2020-10-21

## 2020-10-21 RX ORDER — OMEPRAZOLE 40 MG/1
CAPSULE, DELAYED RELEASE ORAL
Qty: 90 CAPSULE | Refills: 1 | Status: SHIPPED | OUTPATIENT
Start: 2020-10-21 | End: 2021-01-26

## 2020-10-21 RX ORDER — LEVOTHYROXINE SODIUM 88 UG/1
TABLET ORAL
Qty: 90 TABLET | Refills: 1 | Status: SHIPPED | OUTPATIENT
Start: 2020-10-21 | End: 2021-01-26

## 2020-10-21 RX ORDER — DIPHENHYDRAMINE HCL 25 MG
25 CAPSULE ORAL ONCE
Status: CANCELLED | OUTPATIENT
Start: 2020-10-21 | End: 2020-10-21

## 2020-10-21 RX ADMIN — ERYTHROPOIETIN 60000 UNITS: 40000 INJECTION, SOLUTION INTRAVENOUS; SUBCUTANEOUS at 10:45

## 2020-10-21 NOTE — NURSING NOTE
Pt arrived today for CBC, RN review with Procrit injection. Hgb is 8.3 today. Pt states that she is felling very weak and dizzy and symptoms began on Monday of this week. Reviewed labs, symptoms and pt hx with Nathaniel LE. Due to pt hx of Hgb dropping quickly and pt symptoms verbal order received to transfuse 2 units PRBs tomorrow. Labs and orders reviewed with pt. Pt instructed to call with any questions or concerns prior to next visit. Pt stated understanding.      Lab Results   Component Value Date    WBC 4.53 10/21/2020    HGB 8.3 (L) 10/21/2020    HCT 26.9 (L) 10/21/2020    MCV 94.7 10/21/2020     10/21/2020

## 2020-10-22 ENCOUNTER — HOSPITAL ENCOUNTER (OUTPATIENT)
Dept: INFUSION THERAPY | Facility: HOSPITAL | Age: 80
Discharge: HOME | End: 2020-10-22
Admitting: NURSE PRACTITIONER

## 2020-10-22 ENCOUNTER — TELEPHONE (OUTPATIENT)
Dept: INTERNAL MEDICINE | Facility: CLINIC | Age: 80
End: 2020-10-22

## 2020-10-22 VITALS
SYSTOLIC BLOOD PRESSURE: 132 MMHG | TEMPERATURE: 98.4 F | HEIGHT: 62 IN | HEART RATE: 86 BPM | RESPIRATION RATE: 16 BRPM | BODY MASS INDEX: 36.25 KG/M2 | WEIGHT: 197 LBS | OXYGEN SATURATION: 95 % | DIASTOLIC BLOOD PRESSURE: 58 MMHG

## 2020-10-22 DIAGNOSIS — N18.30 CKD STAGE 3 DUE TO TYPE 2 DIABETES MELLITUS (HCC): ICD-10-CM

## 2020-10-22 DIAGNOSIS — D64.9 ANEMIA REQUIRING TRANSFUSIONS: ICD-10-CM

## 2020-10-22 DIAGNOSIS — E11.22 CKD STAGE 3 DUE TO TYPE 2 DIABETES MELLITUS (HCC): ICD-10-CM

## 2020-10-22 PROCEDURE — 86900 BLOOD TYPING SEROLOGIC ABO: CPT

## 2020-10-22 PROCEDURE — 63710000001 ACETAMINOPHEN 325 MG TABLET: Performed by: NURSE PRACTITIONER

## 2020-10-22 PROCEDURE — P9016 RBC LEUKOCYTES REDUCED: HCPCS

## 2020-10-22 PROCEDURE — A9270 NON-COVERED ITEM OR SERVICE: HCPCS | Performed by: NURSE PRACTITIONER

## 2020-10-22 PROCEDURE — 36430 TRANSFUSION BLD/BLD COMPNT: CPT

## 2020-10-22 PROCEDURE — 63710000001 DIPHENHYDRAMINE PER 50 MG: Performed by: NURSE PRACTITIONER

## 2020-10-22 RX ORDER — ACETAMINOPHEN 325 MG/1
650 TABLET ORAL ONCE
Status: COMPLETED | OUTPATIENT
Start: 2020-10-22 | End: 2020-10-22

## 2020-10-22 RX ORDER — SODIUM CHLORIDE 9 MG/ML
INJECTION, SOLUTION INTRAVENOUS
Status: COMPLETED
Start: 2020-10-22 | End: 2020-10-22

## 2020-10-22 RX ORDER — SODIUM CHLORIDE 9 MG/ML
250 INJECTION, SOLUTION INTRAVENOUS AS NEEDED
Status: DISCONTINUED | OUTPATIENT
Start: 2020-10-22 | End: 2020-10-24 | Stop reason: HOSPADM

## 2020-10-22 RX ORDER — DIPHENHYDRAMINE HCL 25 MG
25 CAPSULE ORAL ONCE
Status: COMPLETED | OUTPATIENT
Start: 2020-10-22 | End: 2020-10-22

## 2020-10-22 RX ADMIN — ACETAMINOPHEN 650 MG: 325 TABLET, FILM COATED ORAL at 09:15

## 2020-10-22 RX ADMIN — SODIUM CHLORIDE 250 ML: 9 INJECTION, SOLUTION INTRAVENOUS at 12:11

## 2020-10-22 RX ADMIN — DIPHENHYDRAMINE HYDROCHLORIDE 25 MG: 25 CAPSULE ORAL at 09:15

## 2020-10-22 NOTE — NURSING NOTE
0848 To [facility] via w/c for scheduled transfusion. 2 units PRBC's given without complication. 1515 Discharged via w/c without c/o.

## 2020-10-22 NOTE — PATIENT INSTRUCTIONS
"  Call [employer] [facility] at [phone]   [email or web address]-[email or web address]-happens-to-[email or web address]-blood/blood-transfusions/types-[email or web address]-[email or web address].[email or web address]\"> [email or web address]-[email or web address]-[email or web address]-happens-to-donated-blood/blood-transfusions/risks-complications.html\">   Blood Transfusion, Adult, Care After  This sheet gives you information about how to care for yourself after your procedure. Your health care provider may also give you more specific instructions. If you have problems or questions, contact your health care provider.  What can I expect after the procedure?  After the procedure, it is common to have:  · Bruising and soreness where the IV was inserted.  · A fever or chills on the day of the procedure. This may be your body's response to the new blood cells received.  · A headache.  Follow these instructions at home:  IV insertion site care         · Follow instructions from your health care provider about how to take care of your IV insertion site. Make sure you:  ? Wash your hands with soap and water before and after you change your bandage (dressing). If soap and water are not available, use hand .  ? Change your dressing as told by your health care provider.  · Check your IV insertion site every day for signs of infection. Check for:  ? Redness, swelling, or pain.  ? Bleeding from the site.  ? Warmth.  ? Pus or a bad smell.  General instructions  · Take over-the-counter and prescription medicines only as told by your health care provider.  · Rest as told by your health care provider.  · Return to your normal activities as told by your health care provider.  · Keep all follow-up visits as told by your health care provider. This is important.  Contact a health care provider if:  · You have itching or red, swollen areas of skin (hives).  · You feel anxious.  · You feel weak after doing your normal activities.  · You have redness, " "swelling, warmth, or pain around the IV insertion site.  · You have blood coming from the IV insertion site that does not stop with pressure.  · You have pus or a bad smell coming from your IV insertion site.  Get help right away if:  · You have symptoms of a serious allergic or immune system reaction, including:  ? Trouble breathing or shortness of breath.  ? Swelling of the face or feeling flushed.  ? Fever or chills.  ? Pain in the head, back, or chest.  ? Dark urine or blood in the urine.  ? Widespread rash.  ? Fast heartbeat.  ? Feeling dizzy or light-headed.  If you receive your blood transfusion in an outpatient setting, you will be told whom to contact to report any reactions.  These symptoms may represent a serious problem that is an emergency. Do not wait to see if the symptoms will go away. Get medical help right away. Call your local emergency services (911 in the [place] Do not drive yourself to the hospital.  Summary  · Bruising and tenderness around the IV insertion site are common.  · Check your IV insertion site every day for signs of infection.  · Rest as told by your health care provider. Return to your normal activities as told by your health care provider.  · Get help right away for symptoms of a serious allergic or immune system reaction to blood transfusion.  This information is not intended to replace advice given to you by your health care provider. Make sure you discuss any questions you have with your health care provider.  Document Released: 01/08/2016 Document Revised: 06/11/2020 Document Reviewed: 06/11/2020  Elsevier Patient Education © 2020 [employer]   if you have any problems or concerns.    We know you have a Choice in healthcare and appreciate you using [facility].  Our purpose is to provide you \"Excellent Care\".  We hope that you will always choose us in the future and continue to recommend us to your family and friends.              "

## 2020-10-23 ENCOUNTER — TELEPHONE (OUTPATIENT)
Dept: ONCOLOGY | Facility: CLINIC | Age: 80
End: 2020-10-23

## 2020-10-23 ENCOUNTER — TELEPHONE (OUTPATIENT)
Dept: ONCOLOGY | Facility: HOSPITAL | Age: 80
End: 2020-10-23

## 2020-10-23 ENCOUNTER — TELEPHONE (OUTPATIENT)
Dept: INTERNAL MEDICINE | Facility: CLINIC | Age: 80
End: 2020-10-23

## 2020-10-23 NOTE — TELEPHONE ENCOUNTER
PATIENT CALLED AND STATES SHE HAS BROKEN OUT WITH A RASH ON HER LEGS AND ARMS FROM HER CHEMO MEDICATION,  lenalidomide (Revlimid) 2.5 MG capsule    SHE DIDN'T CALL DR. BOWLES BECAUSE SHE IS ON VACATION.      [facility] [address] - [phone] [facility] [phone]   [phone]    CALL BACK NUMBER [phone]    SHE HAS NO TRANSPORTATION TO COME IN.    PLEASE CALL WHEN A PRESCRIPTION HAS BEEN SENT TO PHARMACY

## 2020-10-23 NOTE — TELEPHONE ENCOUNTER
----- Message from Elieser Headley MD sent at 10/23/2020 11:13 AM EDT -----  Regarding: RE: REVLIMID RASH AGAIN!  Okay just hold revlimid  ----- Message -----  From: Mayra Caballero RN  Sent: 10/23/2020  10:15 AM EDT  To: Elieser Headley MD  Subject: REVLIMID RASH AGAIN!                             WENT BACK TO REVLIMID 2.5MG DAILY ON 10/14. WOKE UP THIS AM COVERED IN RED BLOTCHY RASH. DOESN'T ITCH. ON FACE, BACK, ARMS, STOMACH AND LEGS. SHOULD PT HOLD MED, TAKE BENADRYL? DOESN'T SEE PROVIDER UNTIL 11/11. NEED TO BE SEEN SOONER?

## 2020-10-23 NOTE — TELEPHONE ENCOUNTER
Spoke with patient. Explained she needed to call Dr. Mejia office and let them know about the reaction. She verbalized understanding and will call them now.

## 2020-10-23 NOTE — TELEPHONE ENCOUNTER
REC'D RESPONSE PER DR. BOWLES TO HAVE PT HOLD REVLIMID. PT MADE AWARE AND SHE V/U. PT AWARE SHE CAN USE HYDROCORTISONE CR, BENADRYL CR OR OATMEAL BATH TO HELP W/ RASH IF IT BECOMES BOTHERSOME.

## 2020-10-23 NOTE — TELEPHONE ENCOUNTER
Pt is on Revlimid, she has been broke out before but this morning she is broke out all over worse that before.

## 2020-10-23 NOTE — TELEPHONE ENCOUNTER
PT CALLING C/O RASH FROM REVLIMID. WOKE UP THIS MORNING W/ IT. IT IS RED AND BLOTCHY. COVERING FACE, STOMACH, BACK, ARMS AND LEGS. DOESN'T ITCH. PT STARTED BACK ON REVLIMID DAILY ON 10/14. WAS TAKING Q OTHER DAY PRIOR. MESSAGED DR. BOWLES FOR ADVICE. PT V/U.

## 2020-10-26 ENCOUNTER — DOCUMENTATION (OUTPATIENT)
Dept: ONCOLOGY | Facility: CLINIC | Age: 80
End: 2020-10-26

## 2020-10-26 ENCOUNTER — TELEPHONE (OUTPATIENT)
Dept: ONCOLOGY | Facility: CLINIC | Age: 80
End: 2020-10-26

## 2020-10-26 NOTE — PROGRESS NOTES
I have rec a scanned message from Maryjane at [facility]-she is calling to confirm Revlimid being on hold.    Per 10/23/2020 Triage note-Pt is to hold Revlimid per Dr Headley due to rash.    I have confirmed this with [facility]

## 2020-10-26 NOTE — TELEPHONE ENCOUNTER
LOUIE WITH [facility], CALLING TO CONFIRM THE PATIENT'S REVLIMID IS ON HOLD UNTIL NOV 11TH, PLEASE ADVISE?    CALL BACK #[phone]

## 2020-10-28 ENCOUNTER — INFUSION (OUTPATIENT)
Dept: ONCOLOGY | Facility: HOSPITAL | Age: 80
End: 2020-10-28

## 2020-10-28 ENCOUNTER — LAB (OUTPATIENT)
Dept: LAB | Facility: HOSPITAL | Age: 80
End: 2020-10-28

## 2020-10-28 DIAGNOSIS — D46.C MYELODYSPLASTIC SYNDROME WITH 5Q DELETION (HCC): ICD-10-CM

## 2020-10-28 DIAGNOSIS — D64.9 ANEMIA REQUIRING TRANSFUSIONS: ICD-10-CM

## 2020-10-28 LAB
BASOPHILS # BLD AUTO: 0.08 10*3/MM3 (ref 0–0.2)
BASOPHILS NFR BLD AUTO: 1.9 % (ref 0–1.5)
DEPRECATED RDW RBC AUTO: 50.8 FL (ref 37–54)
EOSINOPHIL # BLD AUTO: 0.63 10*3/MM3 (ref 0–0.4)
EOSINOPHIL NFR BLD AUTO: 14.9 % (ref 0.3–6.2)
ERYTHROCYTE [DISTWIDTH] IN BLOOD BY AUTOMATED COUNT: 15.6 % (ref 12.3–15.4)
HCT VFR BLD AUTO: 32.5 % (ref 34–46.6)
HGB BLD-MCNC: 10.5 G/DL (ref 12–15.9)
IMM GRANULOCYTES # BLD AUTO: 0.04 10*3/MM3 (ref 0–0.05)
IMM GRANULOCYTES NFR BLD AUTO: 0.9 % (ref 0–0.5)
LYMPHOCYTES # BLD AUTO: 2.01 10*3/MM3 (ref 0.7–3.1)
LYMPHOCYTES NFR BLD AUTO: 47.6 % (ref 19.6–45.3)
MCH RBC QN AUTO: 29.7 PG (ref 26.6–33)
MCHC RBC AUTO-ENTMCNC: 32.3 G/DL (ref 31.5–35.7)
MCV RBC AUTO: 92.1 FL (ref 79–97)
MONOCYTES # BLD AUTO: 0.18 10*3/MM3 (ref 0.1–0.9)
MONOCYTES NFR BLD AUTO: 4.3 % (ref 5–12)
NEUTROPHILS NFR BLD AUTO: 1.28 10*3/MM3 (ref 1.7–7)
NEUTROPHILS NFR BLD AUTO: 30.4 % (ref 42.7–76)
NRBC BLD AUTO-RTO: 0 /100 WBC (ref 0–0.2)
PLATELET # BLD AUTO: 322 10*3/MM3 (ref 140–450)
PMV BLD AUTO: 13 FL (ref 6–12)
RBC # BLD AUTO: 3.53 10*6/MM3 (ref 3.77–5.28)
WBC # BLD AUTO: 4.22 10*3/MM3 (ref 3.4–10.8)

## 2020-10-28 PROCEDURE — 85025 COMPLETE CBC W/AUTO DIFF WBC: CPT | Performed by: INTERNAL MEDICINE

## 2020-10-28 PROCEDURE — 36415 COLL VENOUS BLD VENIPUNCTURE: CPT

## 2020-10-29 ENCOUNTER — DOCUMENTATION (OUTPATIENT)
Dept: ORTHOPEDIC SURGERY | Facility: CLINIC | Age: 80
End: 2020-10-29

## 2020-10-29 RX ORDER — METAXALONE 400 MG/1
800 TABLET ORAL 3 TIMES DAILY
Qty: 42 TABLET | Refills: 0 | Status: SHIPPED | OUTPATIENT
Start: 2020-10-29 | End: 2020-11-05

## 2020-10-30 ENCOUNTER — TELEPHONE (OUTPATIENT)
Dept: INTERNAL MEDICINE | Facility: CLINIC | Age: 80
End: 2020-10-30

## 2020-10-30 NOTE — TELEPHONE ENCOUNTER
Ron with [facility] is calling for additional physical therapy visits.  He is requesting 5 additional visits.    Please advise.    Ron can be reached at [phone]

## 2020-11-03 ENCOUNTER — TELEPHONE (OUTPATIENT)
Dept: INTERNAL MEDICINE | Facility: CLINIC | Age: 80
End: 2020-11-03

## 2020-11-03 NOTE — TELEPHONE ENCOUNTER
Resighini FROM [facility] CALLED STATING SHE HAD TO MISS HER VISIT TODAY AS PT WAS NOT AT HOME     PLEASE ADVISE     CALL BACK  [phone]

## 2020-11-04 ENCOUNTER — PATIENT OUTREACH (OUTPATIENT)
Dept: CASE MANAGEMENT | Facility: OTHER | Age: 80
End: 2020-11-04

## 2020-11-04 ENCOUNTER — INFUSION (OUTPATIENT)
Dept: ONCOLOGY | Facility: HOSPITAL | Age: 80
End: 2020-11-04

## 2020-11-04 ENCOUNTER — LAB (OUTPATIENT)
Dept: LAB | Facility: HOSPITAL | Age: 80
End: 2020-11-04

## 2020-11-04 VITALS
SYSTOLIC BLOOD PRESSURE: 136 MMHG | OXYGEN SATURATION: 95 % | TEMPERATURE: 98.4 F | DIASTOLIC BLOOD PRESSURE: 77 MMHG | HEART RATE: 78 BPM

## 2020-11-04 DIAGNOSIS — E11.22 CKD STAGE 3 DUE TO TYPE 2 DIABETES MELLITUS (HCC): ICD-10-CM

## 2020-11-04 DIAGNOSIS — D64.9 ANEMIA REQUIRING TRANSFUSIONS: Primary | ICD-10-CM

## 2020-11-04 DIAGNOSIS — N18.30 CKD STAGE 3 DUE TO TYPE 2 DIABETES MELLITUS (HCC): ICD-10-CM

## 2020-11-04 DIAGNOSIS — D64.9 ANEMIA REQUIRING TRANSFUSIONS: ICD-10-CM

## 2020-11-04 DIAGNOSIS — D46.C MYELODYSPLASTIC SYNDROME WITH 5Q DELETION (HCC): ICD-10-CM

## 2020-11-04 LAB
BASOPHILS # BLD AUTO: 0.12 10*3/MM3 (ref 0–0.2)
BASOPHILS NFR BLD AUTO: 3 % (ref 0–1.5)
DEPRECATED RDW RBC AUTO: 50.3 FL (ref 37–54)
EOSINOPHIL # BLD AUTO: 0.31 10*3/MM3 (ref 0–0.4)
EOSINOPHIL NFR BLD AUTO: 7.8 % (ref 0.3–6.2)
ERYTHROCYTE [DISTWIDTH] IN BLOOD BY AUTOMATED COUNT: 16.3 % (ref 12.3–15.4)
HCT VFR BLD AUTO: 29.6 % (ref 34–46.6)
HGB BLD-MCNC: 9.5 G/DL (ref 12–15.9)
IMM GRANULOCYTES # BLD AUTO: 0.09 10*3/MM3 (ref 0–0.05)
IMM GRANULOCYTES NFR BLD AUTO: 2.3 % (ref 0–0.5)
LYMPHOCYTES # BLD AUTO: 1.8 10*3/MM3 (ref 0.7–3.1)
LYMPHOCYTES NFR BLD AUTO: 45.6 % (ref 19.6–45.3)
MCH RBC QN AUTO: 29.8 PG (ref 26.6–33)
MCHC RBC AUTO-ENTMCNC: 32.1 G/DL (ref 31.5–35.7)
MCV RBC AUTO: 92.8 FL (ref 79–97)
MONOCYTES # BLD AUTO: 0.22 10*3/MM3 (ref 0.1–0.9)
MONOCYTES NFR BLD AUTO: 5.6 % (ref 5–12)
NEUTROPHILS NFR BLD AUTO: 1.41 10*3/MM3 (ref 1.7–7)
NEUTROPHILS NFR BLD AUTO: 35.7 % (ref 42.7–76)
NRBC BLD AUTO-RTO: 0 /100 WBC (ref 0–0.2)
PLATELET # BLD AUTO: 275 10*3/MM3 (ref 140–450)
PMV BLD AUTO: 11.3 FL (ref 6–12)
RBC # BLD AUTO: 3.19 10*6/MM3 (ref 3.77–5.28)
WBC # BLD AUTO: 3.95 10*3/MM3 (ref 3.4–10.8)

## 2020-11-04 PROCEDURE — 85025 COMPLETE CBC W/AUTO DIFF WBC: CPT | Performed by: INTERNAL MEDICINE

## 2020-11-04 PROCEDURE — 96372 THER/PROPH/DIAG INJ SC/IM: CPT

## 2020-11-04 PROCEDURE — 25010000002 EPOETIN ALFA PER 1000 UNITS: Performed by: INTERNAL MEDICINE

## 2020-11-04 PROCEDURE — 36415 COLL VENOUS BLD VENIPUNCTURE: CPT

## 2020-11-04 RX ADMIN — ERYTHROPOIETIN 60000 UNITS: 40000 INJECTION, SOLUTION INTRAVENOUS; SUBCUTANEOUS at 10:31

## 2020-11-04 NOTE — OUTREACH NOTE
Patient Outreach Note  Incoming call from patient. Patient reports continued episodes of dizziness; feeling tired and burning; frequency with voiding. Patient states to have had urology appointment with Dr. Fall; taking antibiotic as directed and will follow up with Dr. Fall . Confirms appointment with Dr. Headley (hematology); states she is no longer taking Revlamid and skin rash has improved. She will have  eye appointment on 11/11/20. She states PT home health services will see her this week. Patient requests [facility] contact information. Reviewed with patient symptoms; monitoring of symptoms; and home health services. Patient verbalized understanding. No further questions or concerns voiced at this time.     Rebecca Will RN  Ambulatory     11/4/2020, 12:46 EST

## 2020-11-10 NOTE — PROGRESS NOTES
Subjective     REASON FOR FOLLOW-UP:   1.  Macrocytic anemia secondary to myelodysplastic syndrome with 5 q. minus and chronic kidney disease  2.  Monoclonal gammopathy of undetermined significance                             REQUESTING PHYSICIAN:  Dr. Baugh      History of Present Illness   Ms. Singh is a nikita 80-year-old woman returning today for follow-up of her myelodysplastic syndrome with anemia.  She recently initiated Revlimid 2.5 mg daily, however, did report a pruritic rash and was instructed to hold Revlimid for a couple of days.  She took Benadryl and applied hydrocortisone with improvement.  She therefore restarted at 2.5 mg every other day and the rash returned on 10/14/2020.  She was once again asked to hold the Revlimid.    She comes in today with declining counts.  She will receive Procrit again today, we have been continuing weekly as long as her hemoglobin is less than 10.  She reports feeling tired, stating that she knew that her hemoglobin had declined.  She was out of town at the [facility] and she had difficulty getting up and walking.  She felt that her legs are just weak.  She has some ongoing dizziness which is been unchanged.         Hematology History:  Patient presented as 78-year-old woman for evaluation of anemia.  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  She has stage III chronic kidney disease followed by Dr. Garza of nephrology.  Reviewing her records, the patient has had anemia present since at least 2014 but her hemoglobin has worsened over the past 6 months.  The patient was admitted to the hospital in October 2018 with symptomatic anemia, shortness of breath and lightheadedness.  She was found to have hemoglobin of 7.0.  There was concern for GI blood loss although EGD and colonoscopy performed showed no obvious etiology of blood loss.   She was transfused 7 units of packed red blood  cells during the hospital stay.  I do not see any Hemoccult results.  She was previously on Eliquis which was discontinued.  Since discharge in October, the patient has been receiving weekly Procrit 20,000 units in the [facility] at [place], but despite Procrit she has required transfusion on 2 separate occasions.  She is not seeing any bright red blood per rectum or melena.  She complains of fatigue and lightheadedness.    Recent iron profile on 1/17/19 was inconsistent with iron deficiency with an iron saturation 68% and the ferritin was 352.  The red blood cells are macrocytic.  White blood cell and platelet counts have been normal.    The patient was seen in hematology on 1/29/19 and additional evaluation performed.  The reticulocyte count was elevated 3.72% but the haptoglobin and LDH were both normal arguing against a hemolytic process.  B12 and folic acid levels were normal.  Serum protein electrophoresis showed no M spike but the immunofixation identified a small IgA monoclonal protein with lambda specificity; IgA level 544.  Sedimentation rate elevated 58.  A free light chain ratio from the serum was normal 1.64.    The patient was referred for a bone marrow exam performed on 3/6/2019 which showed a cellularity of 35%.  There was erythroid hyperplasia with megaloblastoid changes, normal myeloid maturation, increased megakaryocytes with frequent micro megakaryocytes, scattered lymphoid aggregates.  There were morphologically normal plasma cells increased in #2 8% of the cellularity.  There were no increased blasts.  No increase in iron stores.  Karyotype showed a deletion 5 q. and 19 of 20 cells analyzed.        Past Medical History:   Diagnosis Date   • Allergic rhinitis    • Anemia    • Anxiety    • Appetite absent    • Arthritis    • Asthma    • Back pain    • Bell's palsy    • Black tarry stools    • Blood in stool    • Chronic fatigue    • CKD (chronic kidney disease) stage 3, GFR 30-59 ml/min    • Community  acquired pneumonia of left lung 10/25/2018   • Cough    • Depression    • Diabetes mellitus (CMS/HCC)     LAST A1C 6   • Diabetic gastroparesis (CMS/HCC) 2/19/2016   • Difficulty walking    • Excessive urination at night    • Frequent urination    • GERD (gastroesophageal reflux disease)    • GI bleed    • Gout    • H/O blood clots     LEFT LEG 7 OR 8 YEARS AGO   • Heat intolerance    • History of fall 10/2018   • History of prior pregnancies     x8, miscarriage 5   • History of transfusion 11/2018    due to anemia   • Hyperlipidemia    • Hypertension    • Hypothyroidism    • Normal coronary arteries     by cath 2013   • Orthostatic hypotension    • AARON (obstructive sleep apnea)     DOESNT WEAR REGULARLY   • PONV (postoperative nausea and vomiting)    • Skin cancer    • Stroke (CMS/HCC)     Several mini-strokes   • TIA (transient ischemic attack)     LAST TIA JULY 2017   • Urination pain    • UTI (urinary tract infection)     Dec 2018 and Jan 2019        Past Surgical History:   Procedure Laterality Date   • BACK SURGERY      HARDWARE   • CHOLECYSTECTOMY      OPEN   • COLONOSCOPY  2011    due for repeat in 2021   • COLONOSCOPY N/A 10/28/2018    Procedure: COLONOSCOPY;  Surgeon: Emmanuel Rogers MD;  Location: [facility] OR;  Service: Gastroenterology   • ENDOSCOPY N/A 10/26/2018    Procedure: ESOPHAGOGASTRODUODENOSCOPY;  Surgeon: Emmanuel Rogers MD;  Location: [facility] OR;  Service: Gastroenterology   • HYSTERECTOMY      PARTIAL    • KNEE SURGERY     • NECK SURGERY     • SPINE SURGERY     • TUMOR REMOVAL Left     Leg   • UPPER GASTROINTESTINAL ENDOSCOPY  2014    gastritis.  done by dr. hastings        Current Outpatient Medications on File Prior to Visit   Medication Sig Dispense Refill   • ACCU-CHEK FASTCLIX LANCETS misc TEST 3-4 TIMES DAILY AS DIRECTED 400 each 3   • ACCU-CHEK SMARTVIEW test strip TEST BLOOD SUGAR THREE TIMES DAILY OR AS DIRECTED 300 each 3   • acetaminophen (TYLENOL) 325 MG tablet  "Take 2 tablets by mouth Every 6 (Six) Hours As Needed for Mild Pain . OTC product 40 tablet 0   • albuterol sulfate  (90 Base) MCG/ACT inhaler Inhale 2 puffs Every 4 (Four) Hours As Needed for Wheezing. 1 inhaler 3   • atorvastatin (LIPITOR) 10 MG tablet TAKE ONE TABLET BY MOUTH AT BEDTIME 90 tablet 1   • Blood Glucose Monitoring Suppl (ACCU-CHEK KENNETH SMARTVIEW) w/Device kit TEST blood sugar three times daily or as directed 1 kit 0   • desvenlafaxine (PRISTIQ) 50 MG 24 hr tablet TAKE ONE TABLET BY MOUTH EVERY DAY 90 tablet 1   • diphenhydrAMINE (BENADRYL) 25 mg capsule Take 25 mg by mouth Every 6 (Six) Hours As Needed for Itching.     • Eliquis 5 MG tablet tablet TAKE ONE TABLET BY MOUTH TWICE DAILY 180 tablet 0   • fluconazole (DIFLUCAN) 150 MG tablet TAKE ONE TABLET BY MOUTH ONCE FOR ONE DOSE     • furosemide (LASIX) 20 MG tablet TAKE ONE (1) TABLET ORALLY (BY MOUTH) ONCE DAILY 90 tablet 1   • gabapentin (NEURONTIN) 400 MG capsule TAKE ONE CAPSULE BY MOUTH EVERY MORNING, AT NOON, AND TWO AT BEDTIME 120 capsule 2   • HYDROcodone-acetaminophen (Norco) 5-325 MG per tablet Take 1 tablet by mouth Every 12 (Twelve) Hours As Needed for Severe Pain . 30 tablet 0   • insulin aspart (novoLOG) 100 UNIT/ML injection Inject 5 Units under the skin into the appropriate area as directed 3 (Three) Times a Day With Meals.  12   • levothyroxine (SYNTHROID, LEVOTHROID) 88 MCG tablet TAKE ONE TABLET BY MOUTH EVERY DAY 90 tablet 1   • metaxalone (SKELAXIN) 800 MG tablet TAKE ONE TABLET BY MOUTH THREE TIMES DAILY FOR 7 DAYS FOR musculoskeletal pain     • midodrine (PROAMATINE) 5 MG tablet TAKE ONE TABLET BY MOUTH THREE TIMES DAILY BEFORE MEALS 90 tablet 0   • Mirabegron ER (Myrbetriq) 25 MG tablet sustained-release 24 hour 24 hr tablet Take 25 mg by mouth Daily.     • Needle, Disp, (BD DISP NEEDLES) 30G X 1/2\" misc To be used 3 times daily with Novolog Flexpen. 100 each 5   • nystatin (MYCOSTATIN) 490672 UNIT/GM powder Apply  " topically to the appropriate area as directed 2 (Two) Times a Day. 60 g 2   • O2 (OXYGEN) Inhale 2 L/min Every Night. Uses 2L  overnight only     • omeprazole (priLOSEC) 40 MG capsule TAKE ONE CAPSULE BY MOUTH EVERY DAY 90 capsule 1   • ondansetron (ZOFRAN) 8 MG tablet Take 1 tablet by mouth 3 (Three) Times a Day As Needed for Nausea or Vomiting. 30 tablet 5   • opium-belladonna (B&O SUPPRETTES) 16.2-30 MG suppository Insert 1 suppository into the rectum Every 8 (Eight) Hours As Needed for Bladder Spasms. 4 suppository 0   • phenazopyridine (PYRIDIUM) 200 MG tablet Take 200 mg by mouth 3 (Three) Times a Day.     • potassium chloride 10 MEQ CR tablet TAKE ONE TABLET BY MOUTH EVERY DAY 90 tablet 2   • sulfamethoxazole-trimethoprim (BACTRIM,SEPTRA) 400-80 MG tablet Take 1 tablet by mouth every night at bedtime.     • TRESIBA FLEXTOUCH 200 UNIT/ML solution pen-injector pen injection INJECT 54 UNITS subcutaneously AT BEDTIME 9 mL 11   • UltiCare Mini Pen Needles 31G X 6 MM misc USE TO inject insulins FOUR TIMES DAILY AS DIRECTED 400 each 3   • [DISCONTINUED] gabapentin (NEURONTIN) 400 MG capsule Take 1 capsule by mouth Every 8 (Eight) Hours for 2 days. 6 capsule 0   • [DISCONTINUED] lenalidomide (Revlimid) 2.5 MG capsule TAKE 1 CAPSULE BY MOUTH EVERY DAY . NO BREAKS 28 capsule 0   • [DISCONTINUED] levoFLOXacin (LEVAQUIN) 500 MG tablet Take 500 mg by mouth Daily.     • [DISCONTINUED] nitrofurantoin, macrocrystal-monohydrate, (MACROBID) 100 MG capsule Take 100 mg by mouth 2 (Two) Times a Day.       Current Facility-Administered Medications on File Prior to Visit   Medication Dose Route Frequency Provider Last Rate Last Dose   • acetaminophen (TYLENOL) tablet 650 mg  650 mg Oral Q6H PRN Elieser Headley MD       • diphenhydrAMINE (BENADRYL) capsule 25 mg  25 mg Oral Once Elieser Headley MD       • epoetin chu (EPOGEN,PROCRIT) 60,000 Units 2 mL injection  60,000 Units Subcutaneous Weekly Elieser Headley MD             ALLERGIES:    Allergies   Allergen Reactions   • Baclofen Anxiety     Panic attack, nightmares   • Codeine Itching and Rash   • Lisinopril Cough   • Morphine Hives   • Penicillins Rash     Tolerates cephalosporins         Social History     Socioeconomic History   • Marital status:      Spouse name: Not on file   • Number of children: 3   • Years of education: High School   • Highest education level: Not on file   Occupational History     Employer: RETIRED   Social Needs   • Financial resource strain: Not hard at all   • Food insecurity     Worry: Never true     Inability: Never true   • Transportation needs     Medical: No     Non-medical: No   Tobacco Use   • Smoking status: Never Smoker   • Smokeless tobacco: Never Used   • Tobacco comment: CAFFEINE USE: NONE   Substance and Sexual Activity   • Alcohol use: No   • Drug use: No   • Sexual activity: Defer     Comment: EXERCISE - RARELY        Family History   Problem Relation Age of Onset   • Lupus Mother    • Heart failure Mother 59   • Heart disease Other    • Hypertension Other    • Heart attack Father    • Breast cancer Neg Hx         Review of Systems   Constitutional: Positive for fatigue (improved). Negative for appetite change, fever and unexpected weight change.   HENT: Negative for congestion.    Respiratory: Positive for shortness of breath (with exertion). Negative for apnea and cough.    Gastrointestinal: Negative for abdominal pain, blood in stool, nausea and vomiting.   Genitourinary: Positive for dysuria. Negative for frequency and urgency.   Musculoskeletal: Positive for arthralgias (stable), back pain (stable) and gait problem (stable). Negative for neck stiffness.   Skin: Positive for rash (resolving on legs).   Neurological: Positive for numbness. Negative for dizziness, tremors, speech difficulty and light-headedness.   Hematological: Negative.    Psychiatric/Behavioral: Negative.        Objective     Vitals:    11/11/20 0908   BP:  118/71   Pulse: 91   Temp: 98.7 °F (37.1 °C)   SpO2: 94%   Weight: 91.5 kg (201 lb 12.8 oz)   PainSc: 10-Worst pain ever   PainLoc: Shoulder  Comment: left shoulder     Current Status 11/4/2020   ECOG score 3       Physical Exam    CON: pleasant well-developed somewhat chronic ill appearing woman, she is wearing a facemask.  She is seated in a wheelchair.  She is in no acute distress.  HEENT: no icterus, no thrush, moist membranes  NECK: no jvd  LYMPH: No cervical  lymphadenopathy  CV: RRR, S1S2, no murmur  RESP: Lungs clear to auscultation bilaterally, no wheezing noted.  MUSC: No edema noted.  Poor mobility, and a wheelchair  NEURO: alert and oriented x3, mild global weakness  PSYCH: normal mood and affect      RECENT LABS:  Hematology WBC   Date Value Ref Range Status   11/11/2020 2.86 (L) 3.40 - 10.80 10*3/mm3 Final   04/23/2019 5.14 3.40 - 10.80 10*3/mm3 Final     RBC   Date Value Ref Range Status   11/11/2020 2.92 (L) 3.77 - 5.28 10*6/mm3 Final   04/23/2019 3.02 (L) 3.77 - 5.28 10*6/mm3 Final     Hemoglobin   Date Value Ref Range Status   11/11/2020 8.7 (L) 12.0 - 15.9 g/dL Final     Hematocrit   Date Value Ref Range Status   11/11/2020 28.6 (L) 34.0 - 46.6 % Final     Platelets   Date Value Ref Range Status   11/11/2020 180 140 - 450 10*3/mm3 Final          Assessment/Plan     1.  Macrocytic anemia, worse:  BM biopsy 3/5/19 c/w myelodysplastic syndrome with deletion of 5 q.  In addition, the patient has chronic kidney disease, stage III.  She is currently doing okay on weekly Procrit with transfusion support required more frequently.  Her last transfusion was 10/9/2020.  Her hemoglobin has declined to 8.7 today.    We will continue weekly CBC and Procrit.  She requires transfusion for hemoglobin 8.0 or less and receives Lasix between units of blood.        2.  Monoclonal gammopathy of undetermined significance: The patient's bone marrow showed slight increase in plasma cells 8% of the cellularity but normal  in morphology.  She has an IgA monoclonality on her immunofixation but no measurable M spike and a normal free light chain ratio.   Repeat studies 5/16/2019 showed a stable IgA 509 with a normal light chain ratio, no M spike.  Studies performed 10/24/2019 show stable IgA at 491, no M spike with mildly increased kappa/lambda light chain ratio related to her CKD.  Repeat studies 9/16/2020 stable     She recently started Revlimid 2.5 mg daily, however, did experience a pruritic rash and therefore held for several days.  She took Benadryl and applied hydrocortisone with almost complete resolution.  She restarted taking 2.5 mg every other day on 10/7/2020 and the rash returned on 10/14/2020.  Revlimid has since been held.  We will initiate Neupogen 300 mcg once weekly along with her Procrit as discussed with Dr. Headley today.  Will obtain insurance approval.      3.  Chronic kidney disease, stage III which contributes to anemia    4.  Probable transfusional iron overload.  The patient has poor performance status but low-grade MDS and may benefit from iron chelation but complicated by her CKD.  Ferritin and iron profile 9/16/2020 show a ferritin 1292 and iron saturation unable to be detected.    5.  Chronic pain.  Patient's pain in shoulders is chronic in nature.    PLAN:  1.  We will proceed with Procrit as scheduled today.      2.  Obtain insurance approval for Neupogen 300 mcg once weekly.  Patient will return tomorrow for this injection as we are awaiting insurance approval today.    3.  Continue weekly CBC with nurse review and Procrit when hemoglobin less than 10 as well as weekly Neupogen.    4.  Follow-up with Dr. Headley in 1 month.  1 week prior we will obtain CBC, CMP, ferritin, iron profile, SPEP, RANJANA, free light chains.

## 2020-11-11 ENCOUNTER — OFFICE VISIT (OUTPATIENT)
Dept: ONCOLOGY | Facility: CLINIC | Age: 80
End: 2020-11-11

## 2020-11-11 ENCOUNTER — INFUSION (OUTPATIENT)
Dept: ONCOLOGY | Facility: HOSPITAL | Age: 80
End: 2020-11-11

## 2020-11-11 ENCOUNTER — LAB (OUTPATIENT)
Dept: LAB | Facility: HOSPITAL | Age: 80
End: 2020-11-11

## 2020-11-11 VITALS
TEMPERATURE: 98.7 F | HEART RATE: 91 BPM | BODY MASS INDEX: 36.91 KG/M2 | DIASTOLIC BLOOD PRESSURE: 71 MMHG | OXYGEN SATURATION: 94 % | WEIGHT: 201.8 LBS | SYSTOLIC BLOOD PRESSURE: 118 MMHG

## 2020-11-11 DIAGNOSIS — D64.9 ANEMIA REQUIRING TRANSFUSIONS: ICD-10-CM

## 2020-11-11 DIAGNOSIS — E11.22 CKD STAGE 3 DUE TO TYPE 2 DIABETES MELLITUS (HCC): ICD-10-CM

## 2020-11-11 DIAGNOSIS — D64.9 ANEMIA REQUIRING TRANSFUSIONS: Primary | ICD-10-CM

## 2020-11-11 DIAGNOSIS — D46.C MYELODYSPLASTIC SYNDROME WITH 5Q DELETION (HCC): Primary | ICD-10-CM

## 2020-11-11 DIAGNOSIS — D46.C MYELODYSPLASTIC SYNDROME WITH 5Q DELETION (HCC): ICD-10-CM

## 2020-11-11 DIAGNOSIS — N18.30 CKD STAGE 3 DUE TO TYPE 2 DIABETES MELLITUS (HCC): ICD-10-CM

## 2020-11-11 DIAGNOSIS — D64.9 CHRONIC ANEMIA: ICD-10-CM

## 2020-11-11 DIAGNOSIS — E83.111 IRON OVERLOAD, TRANSFUSIONAL: ICD-10-CM

## 2020-11-11 LAB
ALBUMIN SERPL-MCNC: 3.9 G/DL (ref 3.5–5.2)
ALBUMIN/GLOB SERPL: 1.3 G/DL
ALP SERPL-CCNC: 114 U/L (ref 39–117)
ALT SERPL W P-5'-P-CCNC: 12 U/L (ref 1–33)
ANION GAP SERPL CALCULATED.3IONS-SCNC: 10.4 MMOL/L (ref 5–15)
AST SERPL-CCNC: 9 U/L (ref 1–32)
BASOPHILS # BLD AUTO: 0.06 10*3/MM3 (ref 0–0.2)
BASOPHILS NFR BLD AUTO: 2.1 % (ref 0–1.5)
BILIRUB SERPL-MCNC: 0.9 MG/DL (ref 0–1.2)
BUN SERPL-MCNC: 30 MG/DL (ref 8–23)
BUN/CREAT SERPL: 12.1 (ref 7–25)
CALCIUM SPEC-SCNC: 9.4 MG/DL (ref 8.6–10.5)
CHLORIDE SERPL-SCNC: 101 MMOL/L (ref 98–107)
CO2 SERPL-SCNC: 27.6 MMOL/L (ref 22–29)
CREAT SERPL-MCNC: 2.48 MG/DL (ref 0.57–1)
DEPRECATED RDW RBC AUTO: 63.1 FL (ref 37–54)
EOSINOPHIL # BLD AUTO: 0.24 10*3/MM3 (ref 0–0.4)
EOSINOPHIL NFR BLD AUTO: 8.4 % (ref 0.3–6.2)
ERYTHROCYTE [DISTWIDTH] IN BLOOD BY AUTOMATED COUNT: 18.7 % (ref 12.3–15.4)
GFR SERPL CREATININE-BSD FRML MDRD: 19 ML/MIN/1.73
GLOBULIN UR ELPH-MCNC: 2.9 GM/DL
GLUCOSE SERPL-MCNC: 199 MG/DL (ref 65–99)
HCT VFR BLD AUTO: 28.6 % (ref 34–46.6)
HGB BLD-MCNC: 8.7 G/DL (ref 12–15.9)
IMM GRANULOCYTES # BLD AUTO: 0.03 10*3/MM3 (ref 0–0.05)
IMM GRANULOCYTES NFR BLD AUTO: 1 % (ref 0–0.5)
LYMPHOCYTES # BLD AUTO: 1.48 10*3/MM3 (ref 0.7–3.1)
LYMPHOCYTES NFR BLD AUTO: 51.7 % (ref 19.6–45.3)
MCH RBC QN AUTO: 29.8 PG (ref 26.6–33)
MCHC RBC AUTO-ENTMCNC: 30.4 G/DL (ref 31.5–35.7)
MCV RBC AUTO: 97.9 FL (ref 79–97)
MONOCYTES # BLD AUTO: 0.09 10*3/MM3 (ref 0.1–0.9)
MONOCYTES NFR BLD AUTO: 3.1 % (ref 5–12)
NEUTROPHILS NFR BLD AUTO: 0.96 10*3/MM3 (ref 1.7–7)
NEUTROPHILS NFR BLD AUTO: 33.7 % (ref 42.7–76)
NRBC BLD AUTO-RTO: 0 /100 WBC (ref 0–0.2)
PLATELET # BLD AUTO: 180 10*3/MM3 (ref 140–450)
PMV BLD AUTO: 12 FL (ref 6–12)
POTASSIUM SERPL-SCNC: 4.4 MMOL/L (ref 3.5–5.2)
PROT SERPL-MCNC: 6.8 G/DL (ref 6–8.5)
RBC # BLD AUTO: 2.92 10*6/MM3 (ref 3.77–5.28)
SODIUM SERPL-SCNC: 139 MMOL/L (ref 136–145)
WBC # BLD AUTO: 2.86 10*3/MM3 (ref 3.4–10.8)

## 2020-11-11 PROCEDURE — 96372 THER/PROPH/DIAG INJ SC/IM: CPT

## 2020-11-11 PROCEDURE — 25010000002 EPOETIN ALFA PER 1000 UNITS: Performed by: INTERNAL MEDICINE

## 2020-11-11 PROCEDURE — 80053 COMPREHEN METABOLIC PANEL: CPT | Performed by: INTERNAL MEDICINE

## 2020-11-11 PROCEDURE — 36415 COLL VENOUS BLD VENIPUNCTURE: CPT

## 2020-11-11 PROCEDURE — 99214 OFFICE O/P EST MOD 30 MIN: CPT | Performed by: NURSE PRACTITIONER

## 2020-11-11 PROCEDURE — 85025 COMPLETE CBC W/AUTO DIFF WBC: CPT | Performed by: INTERNAL MEDICINE

## 2020-11-11 RX ORDER — SULFAMETHOXAZOLE AND TRIMETHOPRIM 400; 80 MG/1; MG/1
1 TABLET ORAL
COMMUNITY
Start: 2020-10-19 | End: 2021-02-03 | Stop reason: ALTCHOICE

## 2020-11-11 RX ORDER — METAXALONE 800 MG/1
TABLET ORAL
COMMUNITY
Start: 2020-10-29 | End: 2020-11-12

## 2020-11-11 RX ADMIN — ERYTHROPOIETIN 60000 UNITS: 40000 INJECTION, SOLUTION INTRAVENOUS; SUBCUTANEOUS at 10:00

## 2020-11-12 ENCOUNTER — APPOINTMENT (OUTPATIENT)
Dept: ONCOLOGY | Facility: HOSPITAL | Age: 80
End: 2020-11-12

## 2020-11-12 ENCOUNTER — INFUSION (OUTPATIENT)
Dept: ONCOLOGY | Facility: HOSPITAL | Age: 80
End: 2020-11-12

## 2020-11-12 ENCOUNTER — MEDICATION THERAPY MANAGEMENT (OUTPATIENT)
Dept: PHARMACY | Facility: HOSPITAL | Age: 80
End: 2020-11-12

## 2020-11-12 ENCOUNTER — TELEPHONE (OUTPATIENT)
Dept: ORTHOPEDIC SURGERY | Facility: CLINIC | Age: 80
End: 2020-11-12

## 2020-11-12 VITALS — TEMPERATURE: 98.2 F

## 2020-11-12 DIAGNOSIS — N18.30 CKD STAGE 3 DUE TO TYPE 2 DIABETES MELLITUS (HCC): Primary | ICD-10-CM

## 2020-11-12 DIAGNOSIS — D64.9 ANEMIA REQUIRING TRANSFUSIONS: ICD-10-CM

## 2020-11-12 DIAGNOSIS — E11.22 CKD STAGE 3 DUE TO TYPE 2 DIABETES MELLITUS (HCC): Primary | ICD-10-CM

## 2020-11-12 PROCEDURE — 96372 THER/PROPH/DIAG INJ SC/IM: CPT

## 2020-11-12 PROCEDURE — 25010000002 FILGRASTIM 300 MCG/0.5ML SOLUTION PREFILLED SYRINGE: Performed by: INTERNAL MEDICINE

## 2020-11-12 RX ORDER — CYCLOBENZAPRINE HCL 10 MG
10 TABLET ORAL 3 TIMES DAILY PRN
Qty: 30 TABLET | Refills: 0 | Status: SHIPPED | OUTPATIENT
Start: 2020-11-12 | End: 2020-11-30

## 2020-11-12 RX ADMIN — FILGRASTIM 300 MCG: 300 INJECTION, SOLUTION INTRAVENOUS; SUBCUTANEOUS at 09:07

## 2020-11-12 NOTE — PROGRESS NOTES
[facility] Lab Review- Revlimid      11/11/2020   WBC 3.40 - 10.80 10*3/mm3 2.86 (A)   Neutrophils Absolute 1.70 - 7.00 10*3/mm3 0.96 (A)   Hemoglobin 12.0 - 15.9 g/dL 8.7 (A)   Hematocrit 34.0 - 46.6 % 28.6 (A)   Platelets 140 - 450 10*3/mm3 180   Creatinine 0.57 - 1.00 mg/dL 2.48 (A)   eGFR Non African Am >60 mL/min/1.73 19 (A)   BUN 8 - 23 mg/dL 30 (A)   Sodium 136 - 145 mmol/L 139   Potassium 3.5 - 5.2 mmol/L 4.4   Glucose 65 - 99 mg/dL 199 (A)   Calcium 8.6 - 10.5 mg/dL 9.4   Albumin 3.50 - 5.20 g/dL 3.90   Total Protein 6.0 - 8.5 g/dL 6.8   AST (SGOT) 1 - 32 U/L 9   ALT (SGPT) 1 - 33 U/L 12   Alkaline Phosphatase 39 - 117 U/L 114   Total Bilirubin 0.0 - 1.2 mg/dL 0.9     APRN dictation noted; Revlimid still on hold at this time. Pharmacy will continue to follow.    Thanks,   Mulu Corral, PharmD

## 2020-11-17 ENCOUNTER — TELEPHONE (OUTPATIENT)
Dept: INTERNAL MEDICINE | Facility: CLINIC | Age: 80
End: 2020-11-17

## 2020-11-17 DIAGNOSIS — M25.511 CHRONIC RIGHT SHOULDER PAIN: ICD-10-CM

## 2020-11-17 DIAGNOSIS — G89.29 CHRONIC RIGHT SHOULDER PAIN: ICD-10-CM

## 2020-11-17 NOTE — TELEPHONE ENCOUNTER
[facility] AT HOME REQUEST.  PATIENT HAVING CONSTIPATION. NEEDS MEDICATION.    PATIENT HAS PAIN LEVEL 8-10 ON LEFT SHOULDER.  PATIENT NO LONGER ON NARCO.  PATIENT NEEDS PAIN KILLER.     PATIENT NEEDS SURGERY BUT IS NOT ABLE TO OBTAIN SURGERY AT THIS TIME.  PATIENT IS ON CHEMO.  PLEASE ADVISE    CALL BACK #: [phone]

## 2020-11-18 ENCOUNTER — INFUSION (OUTPATIENT)
Dept: ONCOLOGY | Facility: HOSPITAL | Age: 80
End: 2020-11-18

## 2020-11-18 ENCOUNTER — LAB (OUTPATIENT)
Dept: LAB | Facility: HOSPITAL | Age: 80
End: 2020-11-18

## 2020-11-18 VITALS — TEMPERATURE: 97.1 F

## 2020-11-18 DIAGNOSIS — D64.9 ANEMIA REQUIRING TRANSFUSIONS: Primary | ICD-10-CM

## 2020-11-18 DIAGNOSIS — E11.22 CKD STAGE 3 DUE TO TYPE 2 DIABETES MELLITUS (HCC): ICD-10-CM

## 2020-11-18 DIAGNOSIS — N18.30 CKD STAGE 3 DUE TO TYPE 2 DIABETES MELLITUS (HCC): ICD-10-CM

## 2020-11-18 DIAGNOSIS — D64.9 ANEMIA REQUIRING TRANSFUSIONS: ICD-10-CM

## 2020-11-18 DIAGNOSIS — D46.C MYELODYSPLASTIC SYNDROME WITH 5Q DELETION (HCC): ICD-10-CM

## 2020-11-18 LAB
ABO GROUP BLD: NORMAL
BASOPHILS # BLD AUTO: 0.04 10*3/MM3 (ref 0–0.2)
BASOPHILS NFR BLD AUTO: 1.5 % (ref 0–1.5)
BLD GP AB SCN SERPL QL: NEGATIVE
DEPRECATED RDW RBC AUTO: 66.1 FL (ref 37–54)
EOSINOPHIL # BLD AUTO: 0.19 10*3/MM3 (ref 0–0.4)
EOSINOPHIL NFR BLD AUTO: 7.3 % (ref 0.3–6.2)
ERYTHROCYTE [DISTWIDTH] IN BLOOD BY AUTOMATED COUNT: 19.4 % (ref 12.3–15.4)
HCT VFR BLD AUTO: 26.1 % (ref 34–46.6)
HGB BLD-MCNC: 7.8 G/DL (ref 12–15.9)
IMM GRANULOCYTES # BLD AUTO: 0.01 10*3/MM3 (ref 0–0.05)
IMM GRANULOCYTES NFR BLD AUTO: 0.4 % (ref 0–0.5)
LYMPHOCYTES # BLD AUTO: 1.26 10*3/MM3 (ref 0.7–3.1)
LYMPHOCYTES NFR BLD AUTO: 48.3 % (ref 19.6–45.3)
MCH RBC QN AUTO: 29.5 PG (ref 26.6–33)
MCHC RBC AUTO-ENTMCNC: 29.9 G/DL (ref 31.5–35.7)
MCV RBC AUTO: 98.9 FL (ref 79–97)
MONOCYTES # BLD AUTO: 0.13 10*3/MM3 (ref 0.1–0.9)
MONOCYTES NFR BLD AUTO: 5 % (ref 5–12)
NEUTROPHILS NFR BLD AUTO: 0.98 10*3/MM3 (ref 1.7–7)
NEUTROPHILS NFR BLD AUTO: 37.5 % (ref 42.7–76)
PLATELET # BLD AUTO: 172 10*3/MM3 (ref 140–450)
PMV BLD AUTO: 11.9 FL (ref 6–12)
RBC # BLD AUTO: 2.64 10*6/MM3 (ref 3.77–5.28)
RH BLD: POSITIVE
T&S EXPIRATION DATE: NORMAL
WBC # BLD AUTO: 2.61 10*3/MM3 (ref 3.4–10.8)

## 2020-11-18 PROCEDURE — 86901 BLOOD TYPING SEROLOGIC RH(D): CPT | Performed by: INTERNAL MEDICINE

## 2020-11-18 PROCEDURE — 25010000002 EPOETIN ALFA PER 1000 UNITS: Performed by: INTERNAL MEDICINE

## 2020-11-18 PROCEDURE — 85025 COMPLETE CBC W/AUTO DIFF WBC: CPT | Performed by: INTERNAL MEDICINE

## 2020-11-18 PROCEDURE — 96372 THER/PROPH/DIAG INJ SC/IM: CPT

## 2020-11-18 PROCEDURE — 86923 COMPATIBILITY TEST ELECTRIC: CPT

## 2020-11-18 PROCEDURE — 25010000002 FILGRASTIM 300 MCG/0.5ML SOLUTION PREFILLED SYRINGE: Performed by: NURSE PRACTITIONER

## 2020-11-18 PROCEDURE — 86850 RBC ANTIBODY SCREEN: CPT | Performed by: INTERNAL MEDICINE

## 2020-11-18 PROCEDURE — 86900 BLOOD TYPING SEROLOGIC ABO: CPT | Performed by: INTERNAL MEDICINE

## 2020-11-18 PROCEDURE — 36415 COLL VENOUS BLD VENIPUNCTURE: CPT

## 2020-11-18 RX ORDER — FUROSEMIDE 10 MG/ML
20 INJECTION INTRAMUSCULAR; INTRAVENOUS ONCE
Status: CANCELLED | OUTPATIENT
Start: 2020-11-18 | End: 2020-11-18

## 2020-11-18 RX ORDER — DIPHENHYDRAMINE HCL 25 MG
25 CAPSULE ORAL ONCE
Status: CANCELLED | OUTPATIENT
Start: 2020-11-18 | End: 2020-11-18

## 2020-11-18 RX ORDER — HYDROCODONE BITARTRATE AND ACETAMINOPHEN 5; 325 MG/1; MG/1
1 TABLET ORAL EVERY 12 HOURS PRN
Qty: 30 TABLET | Refills: 0 | Status: SHIPPED | OUTPATIENT
Start: 2020-11-18 | End: 2020-12-14 | Stop reason: SDUPTHER

## 2020-11-18 RX ORDER — SODIUM CHLORIDE 9 MG/ML
250 INJECTION, SOLUTION INTRAVENOUS AS NEEDED
Status: CANCELLED | OUTPATIENT
Start: 2020-11-18

## 2020-11-18 RX ORDER — ACETAMINOPHEN 325 MG/1
650 TABLET ORAL ONCE
Status: CANCELLED | OUTPATIENT
Start: 2020-11-18 | End: 2020-11-18

## 2020-11-18 RX ADMIN — ERYTHROPOIETIN 60000 UNITS: 40000 INJECTION, SOLUTION INTRAVENOUS; SUBCUTANEOUS at 10:20

## 2020-11-18 RX ADMIN — FILGRASTIM 300 MCG: 300 INJECTION, SOLUTION INTRAVENOUS; SUBCUTANEOUS at 10:20

## 2020-11-18 NOTE — TELEPHONE ENCOUNTER
Patient called in and is requesting a refill of HYDROcodone-acetaminophen (Norco) 5-325 MG per tablet    Patient is out of medication     Sent to [facility] [address] - [phone] [facility] [phone]   [phone]      Patient call back [phone]

## 2020-11-18 NOTE — NURSING NOTE
Pt c/o pain to left shoulder pain that she feels has increased since starting neupogen injections. Pt reported that she forgot to take claritin.  Pt given a written and verbal instructions regarding claritin and also instructed to take tylenol for pain.  Pt also c/o some nausea since starting neupogen which she has zofran at home if needed.  HGB 7.8 today.  Two units PRBC'S ordered to be given in [place] [facility] tomorrow at 8:30. T&S drawn and blood bank armband placed on patient.

## 2020-11-19 ENCOUNTER — HOSPITAL ENCOUNTER (OUTPATIENT)
Dept: INFUSION THERAPY | Facility: HOSPITAL | Age: 80
Discharge: HOME | End: 2020-11-19
Admitting: INTERNAL MEDICINE

## 2020-11-19 ENCOUNTER — MEDICATION THERAPY MANAGEMENT (OUTPATIENT)
Dept: PHARMACY | Facility: HOSPITAL | Age: 80
End: 2020-11-19

## 2020-11-19 VITALS
WEIGHT: 205 LBS | SYSTOLIC BLOOD PRESSURE: 134 MMHG | RESPIRATION RATE: 16 BRPM | BODY MASS INDEX: 38.71 KG/M2 | DIASTOLIC BLOOD PRESSURE: 62 MMHG | HEART RATE: 90 BPM | OXYGEN SATURATION: 92 % | HEIGHT: 61 IN | TEMPERATURE: 99 F

## 2020-11-19 DIAGNOSIS — E11.22 CKD STAGE 3 DUE TO TYPE 2 DIABETES MELLITUS (HCC): ICD-10-CM

## 2020-11-19 DIAGNOSIS — N18.30 CKD STAGE 3 DUE TO TYPE 2 DIABETES MELLITUS (HCC): ICD-10-CM

## 2020-11-19 DIAGNOSIS — D64.9 ANEMIA REQUIRING TRANSFUSIONS: ICD-10-CM

## 2020-11-19 PROCEDURE — 36430 TRANSFUSION BLD/BLD COMPNT: CPT

## 2020-11-19 PROCEDURE — A9270 NON-COVERED ITEM OR SERVICE: HCPCS | Performed by: INTERNAL MEDICINE

## 2020-11-19 PROCEDURE — 63710000001 ACETAMINOPHEN 325 MG TABLET: Performed by: INTERNAL MEDICINE

## 2020-11-19 PROCEDURE — 25010000002 FUROSEMIDE PER 20 MG: Performed by: INTERNAL MEDICINE

## 2020-11-19 PROCEDURE — 63710000001 DIPHENHYDRAMINE PER 50 MG: Performed by: INTERNAL MEDICINE

## 2020-11-19 PROCEDURE — 96374 THER/PROPH/DIAG INJ IV PUSH: CPT

## 2020-11-19 PROCEDURE — P9016 RBC LEUKOCYTES REDUCED: HCPCS

## 2020-11-19 PROCEDURE — 86900 BLOOD TYPING SEROLOGIC ABO: CPT

## 2020-11-19 RX ORDER — SODIUM CHLORIDE 9 MG/ML
250 INJECTION, SOLUTION INTRAVENOUS AS NEEDED
Status: DISCONTINUED | OUTPATIENT
Start: 2020-11-19 | End: 2020-11-21 | Stop reason: HOSPADM

## 2020-11-19 RX ORDER — DIPHENHYDRAMINE HCL 25 MG
25 CAPSULE ORAL ONCE
Status: COMPLETED | OUTPATIENT
Start: 2020-11-19 | End: 2020-11-19

## 2020-11-19 RX ORDER — SODIUM CHLORIDE 9 MG/ML
INJECTION, SOLUTION INTRAVENOUS
Status: COMPLETED
Start: 2020-11-19 | End: 2020-11-19

## 2020-11-19 RX ORDER — ACETAMINOPHEN 325 MG/1
650 TABLET ORAL ONCE
Status: COMPLETED | OUTPATIENT
Start: 2020-11-19 | End: 2020-11-19

## 2020-11-19 RX ORDER — FUROSEMIDE 10 MG/ML
20 INJECTION INTRAMUSCULAR; INTRAVENOUS ONCE
Status: COMPLETED | OUTPATIENT
Start: 2020-11-19 | End: 2020-11-19

## 2020-11-19 RX ADMIN — SODIUM CHLORIDE 250 ML: 9 INJECTION, SOLUTION INTRAVENOUS at 11:29

## 2020-11-19 RX ADMIN — FUROSEMIDE 20 MG: 10 INJECTION, SOLUTION INTRAMUSCULAR; INTRAVENOUS at 11:35

## 2020-11-19 RX ADMIN — DIPHENHYDRAMINE HYDROCHLORIDE 25 MG: 25 CAPSULE ORAL at 08:47

## 2020-11-19 RX ADMIN — ACETAMINOPHEN 650 MG: 325 TABLET, FILM COATED ORAL at 08:47

## 2020-11-19 NOTE — PATIENT INSTRUCTIONS
Blood Transfusion, Adult, Care After  This sheet gives you information about how to care for yourself after your procedure. Your doctor may also give you more specific instructions. If you have problems or questions, contact your doctor.  What can I expect after the procedure?  After the procedure, it is common to have:  · Bruising and soreness at the IV site.  · A fever or chills on the day of the procedure. This may be your body's response to the new blood cells received.  · A headache.  Follow these instructions at home:  Insertion site care         · Follow instructions from your doctor about how to take care of your insertion site. This is where an IV tube was put into your vein. Make sure you:  ? Wash your hands with soap and water before and after you change your bandage (dressing). If you cannot use soap and water, use hand .  ? Change your bandage as told by your doctor.  · Check your insertion site every day for signs of infection. Check for:  ? Redness, swelling, or pain.  ? Bleeding from the site.  ? Warmth.  ? Pus or a bad smell.  General instructions  · Take over-the-counter and prescription medicines only as told by your doctor.  · Rest as told by your doctor.  · Go back to your normal activities as told by your doctor.  · Keep all follow-up visits as told by your doctor. This is important.  Contact a doctor if:  · You have itching or red, swollen areas of skin (hives).  · You feel worried or nervous (anxious).  · You feel weak after doing your normal activities.  · You have redness, swelling, warmth, or pain around the insertion site.  · You have blood coming from the insertion site, and the blood does not stop with pressure.  · You have pus or a bad smell coming from the insertion site.  Get help right away if:  · You have signs of a serious reaction. This may be coming from an allergy or the body's defense system (immune system). Signs include:  ? Trouble breathing or shortness of  breath.  ? Swelling of the face or feeling warm (flushed).  ? Fever or chills.  ? Head, chest, or back pain.  ? Dark pee (urine) or blood in the pee.  ? Widespread rash.  ? Fast heartbeat.  ? Feeling dizzy or light-headed.  You may receive your blood transfusion in an outpatient setting. If so, you will be told whom to contact to report any reactions.  These symptoms may be an emergency. Do not wait to see if the symptoms will go away. Get medical help right away. Call your local emergency services (911 in the [place] Do not drive yourself to the hospital.  Summary  · Bruising and soreness at the IV site are common.  · Check your insertion site every day for signs of infection.  · Rest as told by your doctor. Go back to your normal activities as told by your doctor.  · Get help right away if you have signs of a serious reaction.  This information is not intended to replace advice given to you by your health care provider. Make sure you discuss any questions you have with your health care provider.  Document Released: 01/08/2016 Document Revised: 06/11/2020 Document Reviewed: 06/11/2020  Elsevier Patient Education © 2020 [employer]

## 2020-11-19 NOTE — NURSING NOTE
NURSING PROGRESS NOTE: Patient arrived to [facility] via W/C at 0835 for scheduled transfusion.  Consent signed. History and medications reviewed.  2 units PRBC transfuse without incident with patient comfortable in bed.  Patient up to BR with assist several times after Lasix given IV.  Decline a copy of AVS stating has received several copies with past transfusions.  Escorted to lobby per W/C at 1510 and discharged home with her son.  ANISH Kathleen

## 2020-11-25 ENCOUNTER — LAB (OUTPATIENT)
Dept: LAB | Facility: HOSPITAL | Age: 80
End: 2020-11-25

## 2020-11-25 ENCOUNTER — INFUSION (OUTPATIENT)
Dept: ONCOLOGY | Facility: HOSPITAL | Age: 80
End: 2020-11-25

## 2020-11-25 DIAGNOSIS — D46.C MYELODYSPLASTIC SYNDROME WITH 5Q DELETION (HCC): ICD-10-CM

## 2020-11-25 DIAGNOSIS — G89.29 CHRONIC RIGHT SHOULDER PAIN: ICD-10-CM

## 2020-11-25 DIAGNOSIS — D64.9 ANEMIA REQUIRING TRANSFUSIONS: ICD-10-CM

## 2020-11-25 DIAGNOSIS — M25.511 CHRONIC RIGHT SHOULDER PAIN: ICD-10-CM

## 2020-11-25 LAB
BASOPHILS # BLD AUTO: 0.03 10*3/MM3 (ref 0–0.2)
BASOPHILS NFR BLD AUTO: 1.1 % (ref 0–1.5)
DEPRECATED RDW RBC AUTO: 61.3 FL (ref 37–54)
EOSINOPHIL # BLD AUTO: 0.14 10*3/MM3 (ref 0–0.4)
EOSINOPHIL NFR BLD AUTO: 5.1 % (ref 0.3–6.2)
ERYTHROCYTE [DISTWIDTH] IN BLOOD BY AUTOMATED COUNT: 20.8 % (ref 12.3–15.4)
HCT VFR BLD AUTO: 33.7 % (ref 34–46.6)
HGB BLD-MCNC: 10.5 G/DL (ref 12–15.9)
IMM GRANULOCYTES # BLD AUTO: 0.02 10*3/MM3 (ref 0–0.05)
IMM GRANULOCYTES NFR BLD AUTO: 0.7 % (ref 0–0.5)
LYMPHOCYTES # BLD AUTO: 1.46 10*3/MM3 (ref 0.7–3.1)
LYMPHOCYTES NFR BLD AUTO: 53.5 % (ref 19.6–45.3)
MCH RBC QN AUTO: 30.1 PG (ref 26.6–33)
MCHC RBC AUTO-ENTMCNC: 31.2 G/DL (ref 31.5–35.7)
MCV RBC AUTO: 96.6 FL (ref 79–97)
MONOCYTES # BLD AUTO: 0.16 10*3/MM3 (ref 0.1–0.9)
MONOCYTES NFR BLD AUTO: 5.9 % (ref 5–12)
NEUTROPHILS NFR BLD AUTO: 0.92 10*3/MM3 (ref 1.7–7)
NEUTROPHILS NFR BLD AUTO: 33.7 % (ref 42.7–76)
NRBC BLD AUTO-RTO: 0 /100 WBC (ref 0–0.2)
PLATELET # BLD AUTO: 198 10*3/MM3 (ref 140–450)
PMV BLD AUTO: 12.1 FL (ref 6–12)
RBC # BLD AUTO: 3.49 10*6/MM3 (ref 3.77–5.28)
WBC # BLD AUTO: 2.73 10*3/MM3 (ref 3.4–10.8)

## 2020-11-25 PROCEDURE — 36415 COLL VENOUS BLD VENIPUNCTURE: CPT

## 2020-11-25 PROCEDURE — 85025 COMPLETE CBC W/AUTO DIFF WBC: CPT | Performed by: INTERNAL MEDICINE

## 2020-11-30 RX ORDER — CYCLOBENZAPRINE HCL 10 MG
TABLET ORAL
Qty: 30 TABLET | Refills: 0 | Status: SHIPPED | OUTPATIENT
Start: 2020-11-30 | End: 2020-12-16

## 2020-12-02 ENCOUNTER — INFUSION (OUTPATIENT)
Dept: ONCOLOGY | Facility: HOSPITAL | Age: 80
End: 2020-12-02

## 2020-12-02 ENCOUNTER — OFFICE VISIT (OUTPATIENT)
Dept: INTERNAL MEDICINE | Facility: CLINIC | Age: 80
End: 2020-12-02

## 2020-12-02 ENCOUNTER — LAB (OUTPATIENT)
Dept: LAB | Facility: HOSPITAL | Age: 80
End: 2020-12-02

## 2020-12-02 VITALS
DIASTOLIC BLOOD PRESSURE: 70 MMHG | HEART RATE: 83 BPM | TEMPERATURE: 97.8 F | RESPIRATION RATE: 16 BRPM | WEIGHT: 199 LBS | SYSTOLIC BLOOD PRESSURE: 132 MMHG | BODY MASS INDEX: 37.57 KG/M2 | OXYGEN SATURATION: 90 % | HEIGHT: 61 IN

## 2020-12-02 DIAGNOSIS — E83.111 IRON OVERLOAD, TRANSFUSIONAL: ICD-10-CM

## 2020-12-02 DIAGNOSIS — N18.30 CKD STAGE 3 DUE TO TYPE 2 DIABETES MELLITUS (HCC): ICD-10-CM

## 2020-12-02 DIAGNOSIS — I10 ESSENTIAL HYPERTENSION: ICD-10-CM

## 2020-12-02 DIAGNOSIS — E11.40 TYPE 2 DIABETES MELLITUS WITH DIABETIC NEUROPATHY, WITH LONG-TERM CURRENT USE OF INSULIN (HCC): ICD-10-CM

## 2020-12-02 DIAGNOSIS — D46.C MYELODYSPLASTIC SYNDROME WITH 5Q DELETION (HCC): Primary | ICD-10-CM

## 2020-12-02 DIAGNOSIS — D46.C MYELODYSPLASTIC SYNDROME WITH 5Q DELETION (HCC): ICD-10-CM

## 2020-12-02 DIAGNOSIS — K31.89 SPASM OF GASTROINTESTINAL TRACT: ICD-10-CM

## 2020-12-02 DIAGNOSIS — E11.22 CKD STAGE 3 DUE TO TYPE 2 DIABETES MELLITUS (HCC): ICD-10-CM

## 2020-12-02 DIAGNOSIS — Z79.4 TYPE 2 DIABETES MELLITUS WITH DIABETIC NEUROPATHY, WITH LONG-TERM CURRENT USE OF INSULIN (HCC): ICD-10-CM

## 2020-12-02 DIAGNOSIS — K59.03 DRUG-INDUCED CONSTIPATION: ICD-10-CM

## 2020-12-02 DIAGNOSIS — D64.9 ANEMIA REQUIRING TRANSFUSIONS: ICD-10-CM

## 2020-12-02 LAB
BASOPHILS # BLD AUTO: 0.05 10*3/MM3 (ref 0–0.2)
BASOPHILS NFR BLD AUTO: 1.8 % (ref 0–1.5)
DEPRECATED RDW RBC AUTO: 76.7 FL (ref 37–54)
EOSINOPHIL # BLD AUTO: 0.14 10*3/MM3 (ref 0–0.4)
EOSINOPHIL NFR BLD AUTO: 4.9 % (ref 0.3–6.2)
ERYTHROCYTE [DISTWIDTH] IN BLOOD BY AUTOMATED COUNT: 21.6 % (ref 12.3–15.4)
FERRITIN SERPL-MCNC: 1503 NG/ML (ref 13–150)
HCT VFR BLD AUTO: 35 % (ref 34–46.6)
HGB BLD-MCNC: 11 G/DL (ref 12–15.9)
IMM GRANULOCYTES # BLD AUTO: 0 10*3/MM3 (ref 0–0.05)
IMM GRANULOCYTES NFR BLD AUTO: 0 % (ref 0–0.5)
IRON 24H UR-MRATE: 103 MCG/DL (ref 37–145)
IRON SATN MFR SERPL: 63 % (ref 20–50)
LYMPHOCYTES # BLD AUTO: 1.26 10*3/MM3 (ref 0.7–3.1)
LYMPHOCYTES NFR BLD AUTO: 44.2 % (ref 19.6–45.3)
MCH RBC QN AUTO: 31.7 PG (ref 26.6–33)
MCHC RBC AUTO-ENTMCNC: 31.4 G/DL (ref 31.5–35.7)
MCV RBC AUTO: 100.9 FL (ref 79–97)
MONOCYTES # BLD AUTO: 0.11 10*3/MM3 (ref 0.1–0.9)
MONOCYTES NFR BLD AUTO: 3.9 % (ref 5–12)
NEUTROPHILS NFR BLD AUTO: 1.29 10*3/MM3 (ref 1.7–7)
NEUTROPHILS NFR BLD AUTO: 45.2 % (ref 42.7–76)
PLATELET # BLD AUTO: 254 10*3/MM3 (ref 140–450)
PMV BLD AUTO: 11.2 FL (ref 6–12)
RBC # BLD AUTO: 3.47 10*6/MM3 (ref 3.77–5.28)
TIBC SERPL-MCNC: 164 MCG/DL (ref 298–536)
UIBC SERPL-MCNC: 61 MCG/DL (ref 112–346)
WBC # BLD AUTO: 2.85 10*3/MM3 (ref 3.4–10.8)

## 2020-12-02 PROCEDURE — 83550 IRON BINDING TEST: CPT | Performed by: NURSE PRACTITIONER

## 2020-12-02 PROCEDURE — 83883 ASSAY NEPHELOMETRY NOT SPEC: CPT | Performed by: NURSE PRACTITIONER

## 2020-12-02 PROCEDURE — 36415 COLL VENOUS BLD VENIPUNCTURE: CPT

## 2020-12-02 PROCEDURE — 84165 PROTEIN E-PHORESIS SERUM: CPT | Performed by: NURSE PRACTITIONER

## 2020-12-02 PROCEDURE — 83540 ASSAY OF IRON: CPT | Performed by: NURSE PRACTITIONER

## 2020-12-02 PROCEDURE — 85025 COMPLETE CBC W/AUTO DIFF WBC: CPT | Performed by: NURSE PRACTITIONER

## 2020-12-02 PROCEDURE — 99214 OFFICE O/P EST MOD 30 MIN: CPT | Performed by: INTERNAL MEDICINE

## 2020-12-02 PROCEDURE — 86334 IMMUNOFIX E-PHORESIS SERUM: CPT | Performed by: NURSE PRACTITIONER

## 2020-12-02 PROCEDURE — 82784 ASSAY IGA/IGD/IGG/IGM EACH: CPT | Performed by: NURSE PRACTITIONER

## 2020-12-02 PROCEDURE — 84155 ASSAY OF PROTEIN SERUM: CPT | Performed by: NURSE PRACTITIONER

## 2020-12-02 PROCEDURE — 82728 ASSAY OF FERRITIN: CPT | Performed by: NURSE PRACTITIONER

## 2020-12-02 RX ORDER — DICYCLOMINE HYDROCHLORIDE 10 MG/1
CAPSULE ORAL
Qty: 60 CAPSULE | Refills: 0 | Status: SHIPPED | OUTPATIENT
Start: 2020-12-02 | End: 2021-01-01 | Stop reason: HOSPADM

## 2020-12-02 RX ORDER — AMOXICILLIN 250 MG
2 CAPSULE ORAL DAILY
Qty: 60 TABLET | Refills: 3 | Status: SHIPPED | OUTPATIENT
Start: 2020-12-02

## 2020-12-02 NOTE — PROGRESS NOTES
Joya Singh is a 80 y.o. female, who presents with a chief complaint of   Chief Complaint   Patient presents with   • Bowel Issues   • Follow-up       HPI   Pt here for f/u.    She has had chronic bowel issues and lower abd pain.  She has issues with frequent constipation.  She also gets frequent UTI's and follows with urology.  Urology has considered botox for the bladder spasms.  She was given opium-belladonna suppository which helped pt but it was too expensive for her.     She c/o soa and chronic fatigue.  She wearing oxygen at night.  She has MDS and is taking Revlimid.  Hemoglobin recently down to 7.8.  She got a blood transfusion and hgb today was 11.  She follows with dr. Headley.  No fever, cough or congestion.  She says right now she feels better than a couple of weeks ago.  Ferritin levels are very high bc of her recurrent transfusions. She is also on procrit as her ckd contributes to her anemia as well.   Oncology follows a weekly cbc and transfuses for hgb 8.0 or less.  She has also had neupogen recently.  She is on bactrim nightly for prophylaxis.  Next appt with dr. Headley on 12/9.    Her shoulder cont to hurt.  She is taking pain meds and flexeril bid.  She has seen ortho multiple times bc of this.  Last shoulder injection as 10/6.    T2DM - pt reports her sugars are doing good. She says almost all readings are below 150.  No a1c was checked bc of her multiple blood transfusions which would make reading inaccurate.     CKD 3b - recent creatinine 2.48 on 11/11.    HTN - bp well controlled.  No ha/dizziness    The following portions of the patient's history were reviewed and updated as appropriate: allergies, current medications, past family history, past medical history, past social history, past surgical history and problem list.    Allergies: Baclofen, Codeine, Lisinopril, Morphine, and Penicillins    Review of Systems   Constitutional: Positive for fatigue.   HENT: Negative.    Eyes: Negative.     Respiratory: Negative.    Cardiovascular: Negative.    Gastrointestinal: Negative.    Endocrine: Negative.    Genitourinary: Negative.    Musculoskeletal: Negative.    Skin: Negative.    Allergic/Immunologic: Negative.    Neurological: Negative.    Hematological: Negative.    Psychiatric/Behavioral: Negative.    All other systems reviewed and are negative.            Wt Readings from Last 3 Encounters:   12/02/20 90.3 kg (199 lb)   11/19/20 93 kg (205 lb)   11/11/20 91.5 kg (201 lb 12.8 oz)     Temp Readings from Last 3 Encounters:   12/02/20 97.8 °F (36.6 °C) (Temporal)   11/19/20 99 °F (37.2 °C)   11/18/20 97.1 °F (36.2 °C) (Infrared)     BP Readings from Last 3 Encounters:   12/02/20 132/70   11/19/20 134/62   11/11/20 118/71     Pulse Readings from Last 3 Encounters:   12/02/20 83   11/19/20 90   11/11/20 91     Body mass index is 37.6 kg/m².  @LASTSAO2(3)@    Physical Exam  Vitals signs and nursing note reviewed.   Constitutional:       General: She is not in acute distress.     Appearance: She is well-developed.      Comments: Appears chronically ill, in wheelchair   HENT:      Head: Normocephalic and atraumatic.      Right Ear: External ear normal.      Left Ear: External ear normal.      Nose: Nose normal.   Eyes:      Conjunctiva/sclera: Conjunctivae normal.      Pupils: Pupils are equal, round, and reactive to light.   Neck:      Musculoskeletal: Normal range of motion and neck supple.   Cardiovascular:      Rate and Rhythm: Normal rate and regular rhythm.      Heart sounds: Normal heart sounds.   Pulmonary:      Effort: Pulmonary effort is normal. No respiratory distress.      Breath sounds: Normal breath sounds. No wheezing.   Musculoskeletal:      Comments: In wheelchair   Skin:     General: Skin is warm and dry.   Neurological:      Mental Status: She is alert and oriented to person, place, and time.   Psychiatric:         Behavior: Behavior normal.         Thought Content: Thought content normal.          Judgment: Judgment normal.         Results for orders placed or performed in visit on 12/02/20   Ferritin    Specimen: Blood   Result Value Ref Range    Ferritin 1,503.00 (H) 13.00 - 150.00 ng/mL   Iron Profile    Specimen: Blood   Result Value Ref Range    Iron 103 37 - 145 mcg/dL    Iron Saturation 63 (H) 20 - 50 %    UIBC 61 (L) 112 - 346 mcg/dL    TIBC 164 (L) 298 - 536 mcg/dL   CBC Auto Differential    Specimen: Blood   Result Value Ref Range    WBC 2.85 (L) 3.40 - 10.80 10*3/mm3    RBC 3.47 (L) 3.77 - 5.28 10*6/mm3    Hemoglobin 11.0 (L) 12.0 - 15.9 g/dL    Hematocrit 35.0 34.0 - 46.6 %    .9 (H) 79.0 - 97.0 fL    MCH 31.7 26.6 - 33.0 pg    MCHC 31.4 (L) 31.5 - 35.7 g/dL    RDW 21.6 (H) 12.3 - 15.4 %    RDW-SD 76.7 (H) 37.0 - 54.0 fl    MPV 11.2 6.0 - 12.0 fL    Platelets 254 140 - 450 10*3/mm3    Neutrophil % 45.2 42.7 - 76.0 %    Lymphocyte % 44.2 19.6 - 45.3 %    Monocyte % 3.9 (L) 5.0 - 12.0 %    Eosinophil % 4.9 0.3 - 6.2 %    Basophil % 1.8 (H) 0.0 - 1.5 %    Immature Grans % 0.0 0.0 - 0.5 %    Neutrophils, Absolute 1.29 (L) 1.70 - 7.00 10*3/mm3    Lymphocytes, Absolute 1.26 0.70 - 3.10 10*3/mm3    Monocytes, Absolute 0.11 0.10 - 0.90 10*3/mm3    Eosinophils, Absolute 0.14 0.00 - 0.40 10*3/mm3    Basophils, Absolute 0.05 0.00 - 0.20 10*3/mm3    Immature Grans, Absolute 0.00 0.00 - 0.05 10*3/mm3     *Note: Due to a large number of results and/or encounters for the requested time period, some results have not been displayed. A complete set of results can be found in Results Review.           Diagnoses and all orders for this visit:    1. Myelodysplastic syndrome with 5q deletion (CMS/HCC) (Primary)    2. Spasm of gastrointestinal tract  -     dicyclomine (Bentyl) 10 MG capsule; Take 1 cap po q 8 hours prn spasm  Dispense: 60 capsule; Refill: 0    3. Drug-induced constipation  -     sennosides-docusate (senna-docusate sodium) 8.6-50 MG per tablet; Take 2 tablets by mouth Daily.  Dispense: 60  tablet; Refill: 3    4. Essential hypertension    5. Iron overload, transfusional    6. Type 2 diabetes mellitus with diabetic neuropathy, with long-term current use of insulin (CMS/[facility])  -     Microalbumin / Creatinine Urine Ratio - Urine, Clean Catch    7. CKD stage 3 due to type 2 diabetes mellitus (CMS/[facility])  -     Microalbumin / Creatinine Urine Ratio - Urine, Clean Catch      Cont close f/u with oncology.     Outpatient Medications Prior to Visit   Medication Sig Dispense Refill   • ACCU-CHEK FASTCLIX LANCETS misc TEST 3-4 TIMES DAILY AS DIRECTED 400 each 3   • ACCU-CHEK SMARTVIEW test strip TEST BLOOD SUGAR THREE TIMES DAILY OR AS DIRECTED 300 each 3   • acetaminophen (TYLENOL) 325 MG tablet Take 2 tablets by mouth Every 6 (Six) Hours As Needed for Mild Pain . OTC product 40 tablet 0   • albuterol sulfate  (90 Base) MCG/ACT inhaler Inhale 2 puffs Every 4 (Four) Hours As Needed for Wheezing. 1 inhaler 3   • atorvastatin (LIPITOR) 10 MG tablet TAKE ONE TABLET BY MOUTH AT BEDTIME 90 tablet 1   • Blood Glucose Monitoring Suppl (ACCU-CHEK KENNETH SMARTVIEW) w/Device kit TEST blood sugar three times daily or as directed 1 kit 0   • cyclobenzaprine (FLEXERIL) 10 MG tablet TAKE ONE TABLET BY MOUTH THREE TIMES DAILY as needed for muscle spasms 30 tablet 0   • desvenlafaxine (PRISTIQ) 50 MG 24 hr tablet TAKE ONE TABLET BY MOUTH EVERY DAY 90 tablet 1   • diphenhydrAMINE (BENADRYL) 25 mg capsule Take 25 mg by mouth Every 6 (Six) Hours As Needed for Itching.     • Eliquis 5 MG tablet tablet TAKE ONE TABLET BY MOUTH TWICE DAILY 180 tablet 0   • fluconazole (DIFLUCAN) 150 MG tablet TAKE ONE TABLET BY MOUTH ONCE FOR ONE DOSE     • furosemide (LASIX) 20 MG tablet TAKE ONE (1) TABLET ORALLY (BY MOUTH) ONCE DAILY 90 tablet 1   • gabapentin (NEURONTIN) 400 MG capsule TAKE ONE CAPSULE BY MOUTH EVERY MORNING, AT NOON, AND TWO AT BEDTIME 120 capsule 2   • HYDROcodone-acetaminophen (Norco) 5-325 MG per tablet Take 1 tablet by  "mouth Every 12 (Twelve) Hours As Needed for Severe Pain . 30 tablet 0   • insulin aspart (novoLOG) 100 UNIT/ML injection Inject 5 Units under the skin into the appropriate area as directed 3 (Three) Times a Day With Meals.  12   • levothyroxine (SYNTHROID, LEVOTHROID) 88 MCG tablet TAKE ONE TABLET BY MOUTH EVERY DAY 90 tablet 1   • midodrine (PROAMATINE) 5 MG tablet TAKE ONE TABLET BY MOUTH THREE TIMES DAILY BEFORE MEALS 90 tablet 0   • Mirabegron ER (Myrbetriq) 25 MG tablet sustained-release 24 hour 24 hr tablet Take 25 mg by mouth Daily.     • Needle, Disp, (BD DISP NEEDLES) 30G X 1/2\" misc To be used 3 times daily with Novolog Flexpen. 100 each 5   • nystatin (MYCOSTATIN) 622322 UNIT/GM powder Apply  topically to the appropriate area as directed 2 (Two) Times a Day. 60 g 2   • O2 (OXYGEN) Inhale 2 L/min Every Night. Uses 2L  overnight only     • omeprazole (priLOSEC) 40 MG capsule TAKE ONE CAPSULE BY MOUTH EVERY DAY 90 capsule 1   • ondansetron (ZOFRAN) 8 MG tablet Take 1 tablet by mouth 3 (Three) Times a Day As Needed for Nausea or Vomiting. 30 tablet 5   • phenazopyridine (PYRIDIUM) 200 MG tablet Take 200 mg by mouth 3 (Three) Times a Day.     • potassium chloride 10 MEQ CR tablet TAKE ONE TABLET BY MOUTH EVERY DAY 90 tablet 2   • sulfamethoxazole-trimethoprim (BACTRIM,SEPTRA) 400-80 MG tablet Take 1 tablet by mouth every night at bedtime.     • TRESIBA FLEXTOUCH 200 UNIT/ML solution pen-injector pen injection INJECT 54 UNITS subcutaneously AT BEDTIME 9 mL 11   • UltiCare Mini Pen Needles 31G X 6 MM misc USE TO inject insulins FOUR TIMES DAILY AS DIRECTED 400 each 3   • opium-belladonna (B&O SUPPRETTES) 16.2-30 MG suppository Insert 1 suppository into the rectum Every 8 (Eight) Hours As Needed for Bladder Spasms. 4 suppository 0     Facility-Administered Medications Prior to Visit   Medication Dose Route Frequency Provider Last Rate Last Admin   • acetaminophen (TYLENOL) tablet 650 mg  650 mg Oral Q6H PRN " Elieser Headley MD       • diphenhydrAMINE (BENADRYL) capsule 25 mg  25 mg Oral Once Elieser Headley MD         New Medications Ordered This Visit   Medications   • dicyclomine (Bentyl) 10 MG capsule     Sig: Take 1 cap po q 8 hours prn spasm     Dispense:  60 capsule     Refill:  0   • sennosides-docusate (senna-docusate sodium) 8.6-50 MG per tablet     Sig: Take 2 tablets by mouth Daily.     Dispense:  60 tablet     Refill:  3     [unfilled]  Medications Discontinued During This Encounter   Medication Reason   • opium-belladonna (B&O SUPPRETTES) 16.2-30 MG suppository          Return in about 3 months (around 3/2/2021) for Recheck, labs.

## 2020-12-02 NOTE — NURSING NOTE
Hemoglobin 11.0 today. Pt was informed that she again does not need the Procrit and Neupogen injections. The pt states that she has been feeling stronger and was pleased with today's hgb result. She will followup in one week as scheduled for MD visit and possible injections.

## 2020-12-03 LAB
ALBUMIN SERPL ELPH-MCNC: 3.8 G/DL (ref 2.9–4.4)
ALBUMIN/CREAT UR: 22 MG/G CREAT (ref 0–29)
ALBUMIN/GLOB SERPL: 1.3 {RATIO} (ref 0.7–1.7)
ALPHA1 GLOB SERPL ELPH-MCNC: 0.3 G/DL (ref 0–0.4)
ALPHA2 GLOB SERPL ELPH-MCNC: 0.7 G/DL (ref 0.4–1)
B-GLOBULIN SERPL ELPH-MCNC: 1 G/DL (ref 0.7–1.3)
CREAT UR-MCNC: 77.8 MG/DL
GAMMA GLOB SERPL ELPH-MCNC: 1 G/DL (ref 0.4–1.8)
GLOBULIN SER CALC-MCNC: 3 G/DL (ref 2.2–3.9)
IGA SERPL-MCNC: 433 MG/DL (ref 64–422)
IGG SERPL-MCNC: 924 MG/DL (ref 586–1602)
IGM SERPL-MCNC: 134 MG/DL (ref 26–217)
KAPPA LC FREE SER-MCNC: 102.4 MG/L (ref 3.3–19.4)
KAPPA LC FREE/LAMBDA FREE SER: 2.46 {RATIO} (ref 0.26–1.65)
LABORATORY COMMENT REPORT: NORMAL
LAMBDA LC FREE SERPL-MCNC: 41.6 MG/L (ref 5.7–26.3)
M PROTEIN SERPL ELPH-MCNC: NORMAL G/DL
MICROALBUMIN UR-MCNC: 16.9 UG/ML
PROT PATTERN SERPL ELPH-IMP: NORMAL
PROT PATTERN SERPL IFE-IMP: ABNORMAL
PROT SERPL-MCNC: 6.8 G/DL (ref 6–8.5)

## 2020-12-06 ENCOUNTER — APPOINTMENT (OUTPATIENT)
Dept: GENERAL RADIOLOGY | Facility: HOSPITAL | Age: 80
End: 2020-12-06

## 2020-12-06 ENCOUNTER — HOSPITAL ENCOUNTER (EMERGENCY)
Facility: HOSPITAL | Age: 80
Discharge: HOME | End: 2020-12-06
Attending: EMERGENCY MEDICINE | Admitting: EMERGENCY MEDICINE

## 2020-12-06 VITALS
SYSTOLIC BLOOD PRESSURE: 131 MMHG | OXYGEN SATURATION: 100 % | HEIGHT: 62 IN | BODY MASS INDEX: 37.91 KG/M2 | TEMPERATURE: 98.3 F | DIASTOLIC BLOOD PRESSURE: 63 MMHG | RESPIRATION RATE: 18 BRPM | HEART RATE: 68 BPM | WEIGHT: 206 LBS

## 2020-12-06 DIAGNOSIS — L03.115 CELLULITIS OF RIGHT FOOT: Primary | ICD-10-CM

## 2020-12-06 LAB
ALBUMIN SERPL-MCNC: 4.1 G/DL (ref 3.5–5.2)
ALBUMIN/GLOB SERPL: 1.6 G/DL
ALP SERPL-CCNC: 127 U/L (ref 39–117)
ALT SERPL W P-5'-P-CCNC: 11 U/L (ref 1–33)
ANION GAP SERPL CALCULATED.3IONS-SCNC: 8.2 MMOL/L (ref 5–15)
AST SERPL-CCNC: 12 U/L (ref 1–32)
BASOPHILS # BLD AUTO: 0.02 10*3/MM3 (ref 0–0.2)
BASOPHILS NFR BLD AUTO: 0.5 % (ref 0–1.5)
BILIRUB SERPL-MCNC: 0.4 MG/DL (ref 0–1.2)
BUN SERPL-MCNC: 25 MG/DL (ref 8–23)
BUN/CREAT SERPL: 11.1 (ref 7–25)
CALCIUM SPEC-SCNC: 8.8 MG/DL (ref 8.6–10.5)
CHLORIDE SERPL-SCNC: 102 MMOL/L (ref 98–107)
CO2 SERPL-SCNC: 29.8 MMOL/L (ref 22–29)
CREAT SERPL-MCNC: 2.25 MG/DL (ref 0.57–1)
DEPRECATED RDW RBC AUTO: 78 FL (ref 37–54)
EOSINOPHIL # BLD AUTO: 0.2 10*3/MM3 (ref 0–0.4)
EOSINOPHIL NFR BLD AUTO: 5.2 % (ref 0.3–6.2)
ERYTHROCYTE [DISTWIDTH] IN BLOOD BY AUTOMATED COUNT: 21.6 % (ref 12.3–15.4)
GFR SERPL CREATININE-BSD FRML MDRD: 21 ML/MIN/1.73
GLOBULIN UR ELPH-MCNC: 2.6 GM/DL
GLUCOSE SERPL-MCNC: 121 MG/DL (ref 65–99)
HCT VFR BLD AUTO: 33 % (ref 34–46.6)
HGB BLD-MCNC: 10.2 G/DL (ref 12–15.9)
IMM GRANULOCYTES # BLD AUTO: 0.16 10*3/MM3 (ref 0–0.05)
IMM GRANULOCYTES NFR BLD AUTO: 4.2 % (ref 0–0.5)
LYMPHOCYTES # BLD AUTO: 1.67 10*3/MM3 (ref 0.7–3.1)
LYMPHOCYTES NFR BLD AUTO: 43.4 % (ref 19.6–45.3)
MCH RBC QN AUTO: 30.8 PG (ref 26.6–33)
MCHC RBC AUTO-ENTMCNC: 30.9 G/DL (ref 31.5–35.7)
MCV RBC AUTO: 99.7 FL (ref 79–97)
MONOCYTES # BLD AUTO: 0.22 10*3/MM3 (ref 0.1–0.9)
MONOCYTES NFR BLD AUTO: 5.7 % (ref 5–12)
NEUTROPHILS NFR BLD AUTO: 1.58 10*3/MM3 (ref 1.7–7)
NEUTROPHILS NFR BLD AUTO: 41 % (ref 42.7–76)
NRBC BLD AUTO-RTO: 0 /100 WBC (ref 0–0.2)
PLATELET # BLD AUTO: 262 10*3/MM3 (ref 140–450)
PMV BLD AUTO: 11.5 FL (ref 6–12)
POTASSIUM SERPL-SCNC: 4.5 MMOL/L (ref 3.5–5.2)
PROT SERPL-MCNC: 6.7 G/DL (ref 6–8.5)
RBC # BLD AUTO: 3.31 10*6/MM3 (ref 3.77–5.28)
SODIUM SERPL-SCNC: 140 MMOL/L (ref 136–145)
WBC # BLD AUTO: 3.85 10*3/MM3 (ref 3.4–10.8)

## 2020-12-06 PROCEDURE — 85025 COMPLETE CBC W/AUTO DIFF WBC: CPT | Performed by: EMERGENCY MEDICINE

## 2020-12-06 PROCEDURE — 73630 X-RAY EXAM OF FOOT: CPT

## 2020-12-06 PROCEDURE — 80053 COMPREHEN METABOLIC PANEL: CPT | Performed by: EMERGENCY MEDICINE

## 2020-12-06 PROCEDURE — 99282 EMERGENCY DEPT VISIT SF MDM: CPT | Performed by: EMERGENCY MEDICINE

## 2020-12-06 PROCEDURE — 99283 EMERGENCY DEPT VISIT LOW MDM: CPT

## 2020-12-06 RX ORDER — CEFUROXIME AXETIL 250 MG/1
250 TABLET ORAL ONCE
Status: COMPLETED | OUTPATIENT
Start: 2020-12-06 | End: 2020-12-06

## 2020-12-06 RX ORDER — SODIUM CHLORIDE 0.9 % (FLUSH) 0.9 %
10 SYRINGE (ML) INJECTION AS NEEDED
Status: DISCONTINUED | OUTPATIENT
Start: 2020-12-06 | End: 2020-12-06 | Stop reason: HOSPADM

## 2020-12-06 RX ORDER — CEFUROXIME AXETIL 250 MG/1
250 TABLET ORAL 2 TIMES DAILY
Qty: 14 TABLET | Refills: 0 | Status: SHIPPED | OUTPATIENT
Start: 2020-12-06 | End: 2020-12-13

## 2020-12-06 RX ADMIN — CEFUROXIME AXETIL 250 MG: 250 TABLET ORAL at 09:53

## 2020-12-06 NOTE — ED PROVIDER NOTES
Subjective   Joya Singh is a 81 yo WF who presents over concerns about possible cellulitis.  Last night patient had onset of swelling dorsal lateral midfoot with associated erythema.  Patient states her  had cellulitis many times.  She is concerned that she is developing cellulitis.  She denies any injury.  No fever or chills.  No URI symptoms.  No body aches.  Patient presents for evaluation.      History provided by:  Patient      Review of Systems   Constitutional: Negative.  Negative for fever.   HENT: Negative.  Negative for rhinorrhea.    Eyes: Negative.  Negative for redness.   Respiratory: Negative for cough.    Cardiovascular: Positive for leg swelling (Chronic). Negative for chest pain.   Gastrointestinal: Positive for nausea (Chronic). Negative for abdominal pain.   Endocrine: Negative.    Genitourinary: Negative.  Negative for difficulty urinating.   Musculoskeletal: Negative.  Negative for back pain.   Skin: Negative.  Negative for color change.   Neurological: Negative.  Negative for syncope.   Hematological: Negative.    Psychiatric/Behavioral: Negative.    All other systems reviewed and are negative.      Past Medical History:   Diagnosis Date   • Allergic rhinitis    • Anemia    • Anxiety    • Appetite absent    • Arthritis    • Asthma    • Back pain    • Bell's palsy    • Black tarry stools    • Blood in stool    • Chronic fatigue    • CKD (chronic kidney disease) stage 3, GFR 30-59 ml/min    • Community acquired pneumonia of left lung 10/25/2018   • Cough    • Depression    • Diabetes mellitus (CMS/[facility])     LAST A1C 6   • Diabetic gastroparesis (CMS/[facility]) 2/19/2016   • Difficulty walking    • Excessive urination at night    • Frequent urination    • GERD (gastroesophageal reflux disease)    • GI bleed    • Gout    • H/O blood clots     LEFT LEG 7 OR 8 YEARS AGO   • Heat intolerance    • History of fall 10/2018   • History of prior pregnancies     x8, miscarriage 5   • History of transfusion  11/2018    due to anemia   • Hyperlipidemia    • Hypertension    • Hypothyroidism    • Normal coronary arteries     by cath 2013   • Orthostatic hypotension    • AARON (obstructive sleep apnea)     DOESNT WEAR REGULARLY   • PONV (postoperative nausea and vomiting)    • Skin cancer    • Stroke (CMS/HCC)     Several mini-strokes   • TIA (transient ischemic attack)     LAST TIA JULY 2017   • Urination pain    • UTI (urinary tract infection)     Dec 2018 and Jan 2019       Allergies   Allergen Reactions   • Baclofen Anxiety     Panic attack, nightmares   • Codeine Itching and Rash   • Lisinopril Cough   • Morphine Hives   • Penicillins Rash     Tolerates cephalosporins        Past Surgical History:   Procedure Laterality Date   • BACK SURGERY      HARDWARE   • CHOLECYSTECTOMY      OPEN   • COLONOSCOPY  2011    due for repeat in 2021   • COLONOSCOPY N/A 10/28/2018    Procedure: COLONOSCOPY;  Surgeon: Emmanuel Rogers MD;  Location: [facility] OR;  Service: Gastroenterology   • ENDOSCOPY N/A 10/26/2018    Procedure: ESOPHAGOGASTRODUODENOSCOPY;  Surgeon: Emmanuel Rogers MD;  Location: [facility] OR;  Service: Gastroenterology   • HYSTERECTOMY      PARTIAL    • KNEE SURGERY     • NECK SURGERY     • SPINE SURGERY     • TUMOR REMOVAL Left     Leg   • UPPER GASTROINTESTINAL ENDOSCOPY  2014    gastritis.  done by dr. hastings       Family History   Problem Relation Age of Onset   • Lupus Mother    • Heart failure Mother 59   • Heart disease Other    • Hypertension Other    • Heart attack Father    • Breast cancer Neg Hx        Social History     Socioeconomic History   • Marital status:      Spouse name: Not on file   • Number of children: 3   • Years of education: High School   • Highest education level: Not on file   Occupational History     Employer: RETIRED   Social Needs   • Financial resource strain: Not hard at all   • Food insecurity     Worry: Never true     Inability: Never true   • Transportation  needs     Medical: No     Non-medical: No   Tobacco Use   • Smoking status: Never Smoker   • Smokeless tobacco: Never Used   • Tobacco comment: CAFFEINE USE: NONE   Substance and Sexual Activity   • Alcohol use: No   • Drug use: No   • Sexual activity: Defer     Comment: EXERCISE - RARELY           Objective   Physical Exam  Vitals signs and nursing note reviewed.   Constitutional:       General: She is not in acute distress.     Appearance: She is well-developed. She is not diaphoretic.      Comments: 80-year-old white female laying in bed.  Patient appears in fair health for age.  Vital signs notable for oxygen saturation of 86% on room air.  This will be repeated for verification.  Remainder of vitals are unremarkable.  Patient friendly and cooperative.  She does not appear in any distress.   HENT:      Head: Normocephalic and atraumatic.      Right Ear: External ear normal.      Left Ear: External ear normal.      Nose: Nose normal.   Eyes:      Conjunctiva/sclera: Conjunctivae normal.      Pupils: Pupils are equal, round, and reactive to light.   Neck:      Musculoskeletal: Normal range of motion and neck supple.   Cardiovascular:      Rate and Rhythm: Normal rate and regular rhythm.      Pulses:           Dorsalis pedis pulses are 2+ on the right side.      Heart sounds: Normal heart sounds. No murmur. No friction rub. No gallop.    Pulmonary:      Effort: Pulmonary effort is normal.      Breath sounds: Normal breath sounds.   Abdominal:      General: Bowel sounds are normal. There is no distension.      Palpations: Abdomen is soft.      Tenderness: There is no abdominal tenderness.   Musculoskeletal: Normal range of motion.        Feet:    Feet:      Right foot:      Skin integrity: Skin integrity normal.   Skin:     General: Skin is warm and dry.      Findings: Erythema present.   Neurological:      Mental Status: She is alert and oriented to person, place, and time.      Deep Tendon Reflexes: Reflexes are  normal and symmetric.   Psychiatric:         Behavior: Behavior normal.         Procedures           ED Course  ED Course as of Dec 06 0939   Sun Dec 06, 2020   0845 Patient has area of soft tissue swelling and erythema dorsal lateral aspect of right foot.  Will obtain baseline labs as well as an x-ray.  If x-ray is unremarkable we will treat for cellulitis.    [SS]   0851 Patient is on O2 at night.  She was placed on oxygen 2 L/min via nasal cannula.  Sats now 99 to 100%.  Will see if we can wean patient off supplemental oxygen.    [SS]   0923 CBC shows anemia with hemoglobin 10.2 and hematocrit 33.0.  These are at baseline.  CMP shows renal dysfunction with BUN 25 and creatinine 2.25.  Likewise these are at baseline.  Awaiting x-ray reading.    [SS]   0937 X-ray is unremarkable.  I will treat for cellulitis.  Starting on oral antibiotics.  Patient has taken cephalosporins in the past without any allergic reaction.  Placing on Ceftin.  Will DC home.  I have discussed at length with patient all results, diagnoses, treatment and follow-up.    Prescriptions1-Ceftin    [SS]      ED Course User Index  [SS] Teddy Winston MD      Labs Reviewed   COMPREHENSIVE METABOLIC PANEL - Abnormal; Notable for the following components:       Result Value    Glucose 121 (*)     BUN 25 (*)     Creatinine 2.25 (*)     CO2 29.8 (*)     Alkaline Phosphatase 127 (*)     eGFR Non  Amer 21 (*)     All other components within normal limits    Narrative:     GFR Normal >60  Chronic Kidney Disease <60  Kidney Failure <15     CBC WITH AUTO DIFFERENTIAL - Abnormal; Notable for the following components:    RBC 3.31 (*)     Hemoglobin 10.2 (*)     Hematocrit 33.0 (*)     MCV 99.7 (*)     MCHC 30.9 (*)     RDW 21.6 (*)     RDW-SD 78.0 (*)     Neutrophil % 41.0 (*)     Immature Grans % 4.2 (*)     Neutrophils, Absolute 1.58 (*)     Immature Grans, Absolute 0.16 (*)     All other components within normal limits   CBC AND DIFFERENTIAL     Narrative:     The following orders were created for panel order CBC & Differential.  Procedure                               Abnormality         Status                     ---------                               -----------         ------                     CBC Auto Differential[[id number]]        Abnormal            Final result                 Please view results for these tests on the individual orders.     Xr Foot 3+ View Right    Result Date: 12/6/2020  Narrative: CR Foot Comp Min 3 Vws RT INDICATION: Redness and soft tissue swelling dorsal aspect lateral midfoot concerning for cellulitis COMPARISON: None available FINDINGS: 3 views of the right foot.  No fracture or dislocation.  No bone erosion or destruction.  No foreign body. No gross soft tissue abnormality seen mild calcaneal spurring present.     Impression: No acute bony abnormality Signer Name: Jacqueline Viramontes MD  Signed: 12/6/2020 9:28 AM  Workstation Name: [email or web address]  [facility]    My diagnosis for lower extremity pain and injury includes but is not limited to hip fracture, femur fracture, hip dislocation, hip contusion, hip sprain, hip strain, pelvic fracture, knee sprain, patella dislocation, knee dislocation, internal derangement of knee, fractures of the femur, tibia, fibula, ankle, foot and digits, ankle sprain, ankle dislocation, Lisfranc fracture, fracture dislocations of the digits, pulmonary embolism, claudication, peripheral vascular disease, gout, osteoarthritis, rheumatoid arthritis, bursitis, septic joint, poly-rheumatica, polyarthralgia and other inflammatory or infectious disease processes.                                       MDM    Final diagnoses:   Cellulitis of right foot            Teddy Winston MD  12/06/20 0939

## 2020-12-06 NOTE — DISCHARGE INSTRUCTIONS
You have cellulitis or an infection of the skin.  Medication as directed.  Elevate your right foot to heart level as much as possible.  This will help increase healing.  Follow-up with your PCP as above.  Return to ED for worsening symptoms, medical emergencies.

## 2020-12-09 ENCOUNTER — APPOINTMENT (OUTPATIENT)
Dept: ONCOLOGY | Facility: HOSPITAL | Age: 80
End: 2020-12-09

## 2020-12-09 ENCOUNTER — OFFICE VISIT (OUTPATIENT)
Dept: ONCOLOGY | Facility: CLINIC | Age: 80
End: 2020-12-09

## 2020-12-09 ENCOUNTER — LAB (OUTPATIENT)
Dept: LAB | Facility: HOSPITAL | Age: 80
End: 2020-12-09

## 2020-12-09 VITALS
TEMPERATURE: 98.7 F | DIASTOLIC BLOOD PRESSURE: 59 MMHG | RESPIRATION RATE: 16 BRPM | SYSTOLIC BLOOD PRESSURE: 139 MMHG | HEART RATE: 78 BPM | OXYGEN SATURATION: 93 % | BODY MASS INDEX: 36.34 KG/M2 | HEIGHT: 62 IN | WEIGHT: 197.5 LBS

## 2020-12-09 DIAGNOSIS — D64.9 ANEMIA REQUIRING TRANSFUSIONS: ICD-10-CM

## 2020-12-09 DIAGNOSIS — D46.C MYELODYSPLASTIC SYNDROME WITH 5Q DELETION (HCC): Primary | ICD-10-CM

## 2020-12-09 DIAGNOSIS — D47.2 MONOCLONAL GAMMOPATHY OF UNKNOWN SIGNIFICANCE (MGUS): ICD-10-CM

## 2020-12-09 DIAGNOSIS — E83.111 IRON OVERLOAD, TRANSFUSIONAL: ICD-10-CM

## 2020-12-09 LAB
ALBUMIN SERPL-MCNC: 4 G/DL (ref 3.5–5.2)
ALBUMIN/GLOB SERPL: 1.4 G/DL
ALP SERPL-CCNC: 105 U/L (ref 39–117)
ALT SERPL W P-5'-P-CCNC: 9 U/L (ref 1–33)
ANION GAP SERPL CALCULATED.3IONS-SCNC: 9.7 MMOL/L (ref 5–15)
AST SERPL-CCNC: 10 U/L (ref 1–32)
BASOPHILS # BLD AUTO: 0.02 10*3/MM3 (ref 0–0.2)
BASOPHILS NFR BLD AUTO: 0.6 % (ref 0–1.5)
BILIRUB SERPL-MCNC: 0.5 MG/DL (ref 0–1.2)
BUN SERPL-MCNC: 21 MG/DL (ref 8–23)
BUN/CREAT SERPL: 10 (ref 7–25)
CALCIUM SPEC-SCNC: 9 MG/DL (ref 8.6–10.5)
CHLORIDE SERPL-SCNC: 103 MMOL/L (ref 98–107)
CO2 SERPL-SCNC: 28.3 MMOL/L (ref 22–29)
CREAT SERPL-MCNC: 2.1 MG/DL (ref 0.57–1)
DEPRECATED RDW RBC AUTO: 78.5 FL (ref 37–54)
EOSINOPHIL # BLD AUTO: 0.13 10*3/MM3 (ref 0–0.4)
EOSINOPHIL NFR BLD AUTO: 3.8 % (ref 0.3–6.2)
ERYTHROCYTE [DISTWIDTH] IN BLOOD BY AUTOMATED COUNT: 21.4 % (ref 12.3–15.4)
GFR SERPL CREATININE-BSD FRML MDRD: 23 ML/MIN/1.73
GLOBULIN UR ELPH-MCNC: 2.9 GM/DL
GLUCOSE SERPL-MCNC: 138 MG/DL (ref 65–99)
HCT VFR BLD AUTO: 34.2 % (ref 34–46.6)
HGB BLD-MCNC: 10.5 G/DL (ref 12–15.9)
IMM GRANULOCYTES # BLD AUTO: 0.13 10*3/MM3 (ref 0–0.05)
IMM GRANULOCYTES NFR BLD AUTO: 3.8 % (ref 0–0.5)
LYMPHOCYTES # BLD AUTO: 1.43 10*3/MM3 (ref 0.7–3.1)
LYMPHOCYTES NFR BLD AUTO: 41.9 % (ref 19.6–45.3)
MCH RBC QN AUTO: 30.9 PG (ref 26.6–33)
MCHC RBC AUTO-ENTMCNC: 30.7 G/DL (ref 31.5–35.7)
MCV RBC AUTO: 100.6 FL (ref 79–97)
MONOCYTES # BLD AUTO: 0.14 10*3/MM3 (ref 0.1–0.9)
MONOCYTES NFR BLD AUTO: 4.1 % (ref 5–12)
NEUTROPHILS NFR BLD AUTO: 1.56 10*3/MM3 (ref 1.7–7)
NEUTROPHILS NFR BLD AUTO: 45.8 % (ref 42.7–76)
NRBC BLD AUTO-RTO: 0 /100 WBC (ref 0–0.2)
PLATELET # BLD AUTO: 277 10*3/MM3 (ref 140–450)
PMV BLD AUTO: 11.5 FL (ref 6–12)
POTASSIUM SERPL-SCNC: 4.4 MMOL/L (ref 3.5–5.2)
PROT SERPL-MCNC: 6.9 G/DL (ref 6–8.5)
RBC # BLD AUTO: 3.4 10*6/MM3 (ref 3.77–5.28)
SODIUM SERPL-SCNC: 141 MMOL/L (ref 136–145)
WBC # BLD AUTO: 3.41 10*3/MM3 (ref 3.4–10.8)

## 2020-12-09 PROCEDURE — 80053 COMPREHEN METABOLIC PANEL: CPT | Performed by: INTERNAL MEDICINE

## 2020-12-09 PROCEDURE — 36415 COLL VENOUS BLD VENIPUNCTURE: CPT

## 2020-12-09 PROCEDURE — 85025 COMPLETE CBC W/AUTO DIFF WBC: CPT | Performed by: INTERNAL MEDICINE

## 2020-12-09 PROCEDURE — 99214 OFFICE O/P EST MOD 30 MIN: CPT | Performed by: INTERNAL MEDICINE

## 2020-12-09 NOTE — PROGRESS NOTES
Subjective     REASON FOR FOLLOW-UP:   1.  Macrocytic anemia secondary to myelodysplastic syndrome with 5 q. minus and chronic kidney disease  2.  Monoclonal gammopathy of undetermined significance                             REQUESTING PHYSICIAN:  Dr. Baugh      History of Present Illness   Ms. Singh is a nikita 80-year-old woman returning today for follow-up of her myelodysplastic syndrome with anemia and borderline neutropenia.      The patient returns today for a follow-up visit.  She denies increased shortness of breath, lightheadedness or dizziness currently.  She is on antibiotics for cellulitis of the right foot.  She denies fever.        Hematology History:  Patient presented as 78-year-old woman for evaluation of anemia.  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  She has stage III chronic kidney disease followed by Dr. Garza of nephrology.  Reviewing her records, the patient has had anemia present since at least 2014 but her hemoglobin has worsened over the past 6 months.  The patient was admitted to the hospital in October 2018 with symptomatic anemia, shortness of breath and lightheadedness.  She was found to have hemoglobin of 7.0.  There was concern for GI blood loss although EGD and colonoscopy performed showed no obvious etiology of blood loss.   She was transfused 7 units of packed red blood cells during the hospital stay.  I do not see any Hemoccult results.  She was previously on Eliquis which was discontinued.  Since discharge in October, the patient has been receiving weekly Procrit 20,000 units in the [facility] at [place], but despite Procrit she has required transfusion on 2 separate occasions.  She is not seeing any bright red blood per rectum or melena.  She complains of fatigue and lightheadedness.    Recent iron profile on 1/17/19 was inconsistent with iron deficiency with an iron saturation 68% and the  ferritin was 352.  The red blood cells are macrocytic.  White blood cell and platelet counts have been normal.    The patient was seen in hematology on 1/29/19 and additional evaluation performed.  The reticulocyte count was elevated 3.72% but the haptoglobin and LDH were both normal arguing against a hemolytic process.  B12 and folic acid levels were normal.  Serum protein electrophoresis showed no M spike but the immunofixation identified a small IgA monoclonal protein with lambda specificity; IgA level 544.  Sedimentation rate elevated 58.  A free light chain ratio from the serum was normal 1.64.    The patient was referred for a bone marrow exam performed on 3/6/2019 which showed a cellularity of 35%.  There was erythroid hyperplasia with megaloblastoid changes, normal myeloid maturation, increased megakaryocytes with frequent micro megakaryocytes, scattered lymphoid aggregates.  There were morphologically normal plasma cells increased in #2 8% of the cellularity.  There were no increased blasts.  No increase in iron stores.  Karyotype showed a deletion 5 q. and 19 of 20 cells analyzed.    Patient was initiated on Procrit therapy with continued Red cell transfusion requirements despite maximum dose Procrit.  She attempted to take Revlimid on 2 occasions but developed rash despite dose reductions of Revlimid.    Combination Procrit plus Neupogen initiated 11/11/2020.    Past Medical History:   Diagnosis Date   • Allergic rhinitis    • Anemia    • Anxiety    • Appetite absent    • Arthritis    • Asthma    • Back pain    • Bell's palsy    • Black tarry stools    • Blood in stool    • Chronic fatigue    • CKD (chronic kidney disease) stage 3, GFR 30-59 ml/min    • Community acquired pneumonia of left lung 10/25/2018   • Cough    • Depression    • Diabetes mellitus (CMS/HCC)     LAST A1C 6   • Diabetic gastroparesis (CMS/HCC) 2/19/2016   • Difficulty walking    • Excessive urination at night    • Frequent urination     • GERD (gastroesophageal reflux disease)    • GI bleed    • Gout    • H/O blood clots     LEFT LEG 7 OR 8 YEARS AGO   • Heat intolerance    • History of fall 10/2018   • History of prior pregnancies     x8, miscarriage 5   • History of transfusion 11/2018    due to anemia   • Hyperlipidemia    • Hypertension    • Hypothyroidism    • Normal coronary arteries     by cath 2013   • Orthostatic hypotension    • AARON (obstructive sleep apnea)     DOESNT WEAR REGULARLY   • PONV (postoperative nausea and vomiting)    • Skin cancer    • Stroke (CMS/HCC)     Several mini-strokes   • TIA (transient ischemic attack)     LAST TIA JULY 2017   • Urination pain    • UTI (urinary tract infection)     Dec 2018 and Jan 2019        Past Surgical History:   Procedure Laterality Date   • BACK SURGERY      HARDWARE   • CHOLECYSTECTOMY      OPEN   • COLONOSCOPY  2011    due for repeat in 2021   • COLONOSCOPY N/A 10/28/2018    Procedure: COLONOSCOPY;  Surgeon: Emmanuel Rogers MD;  Location: [facility];  Service: Gastroenterology   • ENDOSCOPY N/A 10/26/2018    Procedure: ESOPHAGOGASTRODUODENOSCOPY;  Surgeon: Emmanuel Rogers MD;  Location: [facility];  Service: Gastroenterology   • HYSTERECTOMY      PARTIAL    • KNEE SURGERY     • NECK SURGERY     • SPINE SURGERY     • TUMOR REMOVAL Left     Leg   • UPPER GASTROINTESTINAL ENDOSCOPY  2014    gastritis.  done by dr. hastings        Current Outpatient Medications on File Prior to Visit   Medication Sig Dispense Refill   • ACCU-CHEK FASTCLIX LANCETS misc TEST 3-4 TIMES DAILY AS DIRECTED 400 each 3   • ACCU-CHEK SMARTVIEW test strip TEST BLOOD SUGAR THREE TIMES DAILY OR AS DIRECTED 300 each 3   • acetaminophen (TYLENOL) 325 MG tablet Take 2 tablets by mouth Every 6 (Six) Hours As Needed for Mild Pain . OTC product 40 tablet 0   • albuterol sulfate  (90 Base) MCG/ACT inhaler Inhale 2 puffs Every 4 (Four) Hours As Needed for Wheezing. 1 inhaler 3   • atorvastatin  "(LIPITOR) 10 MG tablet TAKE ONE TABLET BY MOUTH AT BEDTIME 90 tablet 1   • Blood Glucose Monitoring Suppl (ACCU-CHEK KENNETH SMARTVIEW) w/Device kit TEST blood sugar three times daily or as directed 1 kit 0   • cefuroxime (Ceftin) 250 MG tablet Take 1 tablet by mouth 2 (Two) Times a Day for 7 days. 14 tablet 0   • cyclobenzaprine (FLEXERIL) 10 MG tablet TAKE ONE TABLET BY MOUTH THREE TIMES DAILY as needed for muscle spasms 30 tablet 0   • desvenlafaxine (PRISTIQ) 50 MG 24 hr tablet TAKE ONE TABLET BY MOUTH EVERY DAY 90 tablet 1   • dicyclomine (Bentyl) 10 MG capsule Take 1 cap po q 8 hours prn spasm 60 capsule 0   • diphenhydrAMINE (BENADRYL) 25 mg capsule Take 25 mg by mouth Every 6 (Six) Hours As Needed for Itching.     • Eliquis 5 MG tablet tablet TAKE ONE TABLET BY MOUTH TWICE DAILY 180 tablet 0   • furosemide (LASIX) 20 MG tablet TAKE ONE (1) TABLET ORALLY (BY MOUTH) ONCE DAILY 90 tablet 1   • gabapentin (NEURONTIN) 400 MG capsule TAKE ONE CAPSULE BY MOUTH EVERY MORNING, AT NOON, AND TWO AT BEDTIME 120 capsule 2   • HYDROcodone-acetaminophen (Norco) 5-325 MG per tablet Take 1 tablet by mouth Every 12 (Twelve) Hours As Needed for Severe Pain . 30 tablet 0   • insulin aspart (novoLOG) 100 UNIT/ML injection Inject 5 Units under the skin into the appropriate area as directed 3 (Three) Times a Day With Meals.  12   • levothyroxine (SYNTHROID, LEVOTHROID) 88 MCG tablet TAKE ONE TABLET BY MOUTH EVERY DAY 90 tablet 1   • midodrine (PROAMATINE) 5 MG tablet TAKE ONE TABLET BY MOUTH THREE TIMES DAILY BEFORE MEALS 90 tablet 0   • Mirabegron ER (Myrbetriq) 25 MG tablet sustained-release 24 hour 24 hr tablet Take 25 mg by mouth Daily.     • Needle, Disp, (BD DISP NEEDLES) 30G X 1/2\" misc To be used 3 times daily with Novolog Flexpen. 100 each 5   • nystatin (MYCOSTATIN) 720836 UNIT/GM powder Apply  topically to the appropriate area as directed 2 (Two) Times a Day. 60 g 2   • O2 (OXYGEN) Inhale 2 L/min Every Night. Uses 2L  " overnight only     • omeprazole (priLOSEC) 40 MG capsule TAKE ONE CAPSULE BY MOUTH EVERY DAY 90 capsule 1   • ondansetron (ZOFRAN) 8 MG tablet Take 1 tablet by mouth 3 (Three) Times a Day As Needed for Nausea or Vomiting. 30 tablet 5   • phenazopyridine (PYRIDIUM) 200 MG tablet Take 200 mg by mouth 3 (Three) Times a Day.     • potassium chloride 10 MEQ CR tablet TAKE ONE TABLET BY MOUTH EVERY DAY 90 tablet 2   • sennosides-docusate (senna-docusate sodium) 8.6-50 MG per tablet Take 2 tablets by mouth Daily. 60 tablet 3   • sulfamethoxazole-trimethoprim (BACTRIM,SEPTRA) 400-80 MG tablet Take 1 tablet by mouth every night at bedtime.     • TRESIBA FLEXTOUCH 200 UNIT/ML solution pen-injector pen injection INJECT 54 UNITS subcutaneously AT BEDTIME 9 mL 11   • UltiCare Mini Pen Needles 31G X 6 MM misc USE TO inject insulins FOUR TIMES DAILY AS DIRECTED 400 each 3     Current Facility-Administered Medications on File Prior to Visit   Medication Dose Route Frequency Provider Last Rate Last Admin   • acetaminophen (TYLENOL) tablet 650 mg  650 mg Oral Q6H PRN Elieser Headley MD       • diphenhydrAMINE (BENADRYL) capsule 25 mg  25 mg Oral Once Elieser Headley MD            ALLERGIES:    Allergies   Allergen Reactions   • Baclofen Anxiety     Panic attack, nightmares   • Codeine Itching and Rash   • Lisinopril Cough   • Morphine Hives   • Penicillins Rash     Tolerates cephalosporins         Social History     Socioeconomic History   • Marital status:      Spouse name: Not on file   • Number of children: 3   • Years of education: High School   • Highest education level: Not on file   Occupational History     Employer: RETIRED   Social Needs   • Financial resource strain: Not hard at all   • Food insecurity     Worry: Never true     Inability: Never true   • Transportation needs     Medical: No     Non-medical: No   Tobacco Use   • Smoking status: Never Smoker   • Smokeless tobacco: Never Used   • Tobacco comment:  "CAFFEINE USE: NONE   Substance and Sexual Activity   • Alcohol use: No   • Drug use: No   • Sexual activity: Defer     Comment: EXERCISE - RARELY        Family History   Problem Relation Age of Onset   • Lupus Mother    • Heart failure Mother 59   • Heart disease Other    • Hypertension Other    • Heart attack Father    • Breast cancer Neg Hx         Review of Systems   Constitutional: Positive for fatigue (improved). Negative for appetite change, fever and unexpected weight change.   HENT: Negative for congestion.    Respiratory: Positive for shortness of breath (with exertion). Negative for apnea and cough.    Gastrointestinal: Negative for abdominal pain, blood in stool, nausea and vomiting.   Genitourinary: Positive for dysuria. Negative for frequency and urgency.   Musculoskeletal: Positive for arthralgias (stable), back pain (stable) and gait problem (stable). Negative for neck stiffness.   Skin: Positive for rash (Cellulitis right foot).   Neurological: Positive for numbness. Negative for dizziness, tremors, speech difficulty and light-headedness.   Hematological: Negative.    Psychiatric/Behavioral: Negative.        Objective     Vitals:    12/09/20 0923   BP: 139/59   Pulse: 78   Resp: 16   Temp: 98.7 °F (37.1 °C)   TempSrc: Infrared   SpO2: 93%   Weight: 89.6 kg (197 lb 8 oz)   Height: 157 cm (61.81\")   PainSc:   8   PainLoc: Shoulder  Comment: shoulder and foot     Current Status 12/9/2020   ECOG score 1       Physical Exam    CON: pleasant well-developed somewhat chronic ill appearing woman, she is wearing a facemask.  She is seated in a wheelchair.  She is in no acute distress.  HEENT: no icterus, no thrush, moist membranes  NECK: no jvd  LYMPH: No cervical  lymphadenopathy  CV: RRR, S1S2, no murmur  RESP: Lungs clear to auscultation bilaterally, no wheezing noted.  MUSC: No edema noted.  Poor mobility, and a wheelchair  NEURO: alert and oriented x3, mild global weakness  PSYCH: normal mood and " affect  Skin: No induration no petechiae.     RECENT LABS:  Hematology WBC   Date Value Ref Range Status   12/09/2020 3.41 3.40 - 10.80 10*3/mm3 Final   04/23/2019 5.14 3.40 - 10.80 10*3/mm3 Final     RBC   Date Value Ref Range Status   12/09/2020 3.40 (L) 3.77 - 5.28 10*6/mm3 Final   04/23/2019 3.02 (L) 3.77 - 5.28 10*6/mm3 Final     Hemoglobin   Date Value Ref Range Status   12/09/2020 10.5 (L) 12.0 - 15.9 g/dL Final     Hematocrit   Date Value Ref Range Status   12/09/2020 34.2 34.0 - 46.6 % Final     Platelets   Date Value Ref Range Status   12/09/2020 277 140 - 450 10*3/mm3 Final          Assessment/Plan     1. Macrocytic anemia secondary to myelodysplastic syndrome with 5 q. 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  In addition, the patient has chronic kidney disease, stage III.      Patient was initiated on Procrit therapy with continued red cell transfusion requirements despite maximum dose Procrit.  She attempted to take Revlimid on 2 occasions but developed rash despite dose reductions of Revlimid.    Patient initiated Procrit combined with Neupogen weekly on 11/11/2020.  We will continue weekly CBC and Procrit/Neupogen.  She seems to be having a good response to the combination of Procrit/Neupogen.    She requires transfusion for hemoglobin 8.0 or less and receives Lasix between units of blood.        2.  Monoclonal gammopathy of undetermined significance: The patient's bone marrow showed slight increase in plasma cells 8% of the cellularity but normal in morphology.  She has an IgA monoclonality on her immunofixation but no measurable M spike and a normal free light chain ratio.   Repeat studies 5/16/2019 showed a stable IgA 509 with a normal light chain ratio, no M spike.  Studies performed 10/24/2019 show stable IgA at 491, no M spike with mildly increased kappa/lambda light chain ratio related to her CKD.  Repeat studies 9/16/2020 stable.   Repeat studies 12-20 showed no measurable M spike, free light chain ratio 2.46, immunofixation IgA lambda (gA level 433)-all stable      3.  Chronic kidney disease, stage III which contributes to anemia.  Creatinine is stable at 2.1    4.  Transfusional iron overload.  The patient has poor performance status but low-grade MDS and may benefit from iron chelation but complicated by her CKD.  Ferritin and iron profile 12/2/20 showed a ferritin 1500 and iron saturation 63%    5.  Chronic pain.  Patient's pain in shoulders is chronic in nature.

## 2020-12-14 RX ORDER — NYSTATIN 100000 [USP'U]/G
POWDER TOPICAL
Qty: 60 G | Refills: 2 | Status: SHIPPED | OUTPATIENT
Start: 2020-12-14 | End: 2021-01-13 | Stop reason: SDUPTHER

## 2020-12-14 RX ORDER — HYDROCODONE BITARTRATE AND ACETAMINOPHEN 5; 325 MG/1; MG/1
1 TABLET ORAL EVERY 12 HOURS PRN
Qty: 30 TABLET | Refills: 0 | Status: SHIPPED | OUTPATIENT
Start: 2020-12-14 | End: 2021-01-13 | Stop reason: SDUPTHER

## 2020-12-14 NOTE — TELEPHONE ENCOUNTER
Call to check on pt.  bp high for home health but normal at last OV here and on 12/9 w/ hematology.  Will monitor her closely.

## 2020-12-14 NOTE — TELEPHONE ENCOUNTER
Wilsey from [facility] called with the [facility] vitals. ALSO, SHE GOT A  SHINGLES SHOT ON Friday AND SHE HAS A RED SPOT AND IT IS ITCHING AND IS WARM. NO SWELLING.    /64  HEART RATE 76    SHE NEEDS A REFILL ON  HYDROcodone-acetaminophen (Norco) 5-325 MG per tablet    General acute hospital [address] [facility] [phone] [facility] [phone] FX

## 2020-12-16 ENCOUNTER — INFUSION (OUTPATIENT)
Dept: ONCOLOGY | Facility: HOSPITAL | Age: 80
End: 2020-12-16

## 2020-12-16 ENCOUNTER — LAB (OUTPATIENT)
Dept: LAB | Facility: HOSPITAL | Age: 80
End: 2020-12-16

## 2020-12-16 VITALS — TEMPERATURE: 96.8 F

## 2020-12-16 DIAGNOSIS — D64.9 ANEMIA REQUIRING TRANSFUSIONS: Primary | ICD-10-CM

## 2020-12-16 DIAGNOSIS — N18.30 CKD STAGE 3 DUE TO TYPE 2 DIABETES MELLITUS (HCC): ICD-10-CM

## 2020-12-16 DIAGNOSIS — D46.C MYELODYSPLASTIC SYNDROME WITH 5Q DELETION (HCC): ICD-10-CM

## 2020-12-16 DIAGNOSIS — E11.22 CKD STAGE 3 DUE TO TYPE 2 DIABETES MELLITUS (HCC): ICD-10-CM

## 2020-12-16 DIAGNOSIS — D64.9 ANEMIA REQUIRING TRANSFUSIONS: ICD-10-CM

## 2020-12-16 LAB
BASOPHILS # BLD AUTO: 0.07 10*3/MM3 (ref 0–0.2)
BASOPHILS NFR BLD AUTO: 2.3 % (ref 0–1.5)
DEPRECATED RDW RBC AUTO: 75.9 FL (ref 37–54)
EOSINOPHIL # BLD AUTO: 0.14 10*3/MM3 (ref 0–0.4)
EOSINOPHIL NFR BLD AUTO: 4.6 % (ref 0.3–6.2)
ERYTHROCYTE [DISTWIDTH] IN BLOOD BY AUTOMATED COUNT: 20.6 % (ref 12.3–15.4)
HCT VFR BLD AUTO: 32.1 % (ref 34–46.6)
HGB BLD-MCNC: 9.9 G/DL (ref 12–15.9)
IMM GRANULOCYTES # BLD AUTO: 0.05 10*3/MM3 (ref 0–0.05)
IMM GRANULOCYTES NFR BLD AUTO: 1.7 % (ref 0–0.5)
LYMPHOCYTES # BLD AUTO: 1.2 10*3/MM3 (ref 0.7–3.1)
LYMPHOCYTES NFR BLD AUTO: 39.6 % (ref 19.6–45.3)
MCH RBC QN AUTO: 31 PG (ref 26.6–33)
MCHC RBC AUTO-ENTMCNC: 30.8 G/DL (ref 31.5–35.7)
MCV RBC AUTO: 100.6 FL (ref 79–97)
MONOCYTES # BLD AUTO: 0.11 10*3/MM3 (ref 0.1–0.9)
MONOCYTES NFR BLD AUTO: 3.6 % (ref 5–12)
NEUTROPHILS NFR BLD AUTO: 1.46 10*3/MM3 (ref 1.7–7)
NEUTROPHILS NFR BLD AUTO: 48.2 % (ref 42.7–76)
PLATELET # BLD AUTO: 234 10*3/MM3 (ref 140–450)
PMV BLD AUTO: 11.3 FL (ref 6–12)
RBC # BLD AUTO: 3.19 10*6/MM3 (ref 3.77–5.28)
WBC # BLD AUTO: 3.03 10*3/MM3 (ref 3.4–10.8)

## 2020-12-16 PROCEDURE — 25010000002 FILGRASTIM 300 MCG/0.5ML SOLUTION PREFILLED SYRINGE: Performed by: NURSE PRACTITIONER

## 2020-12-16 PROCEDURE — 85025 COMPLETE CBC W/AUTO DIFF WBC: CPT | Performed by: NURSE PRACTITIONER

## 2020-12-16 PROCEDURE — 36415 COLL VENOUS BLD VENIPUNCTURE: CPT

## 2020-12-16 PROCEDURE — 96372 THER/PROPH/DIAG INJ SC/IM: CPT

## 2020-12-16 PROCEDURE — 25010000002 EPOETIN ALFA PER 1000 UNITS: Performed by: INTERNAL MEDICINE

## 2020-12-16 RX ORDER — CYCLOBENZAPRINE HCL 10 MG
TABLET ORAL
Qty: 30 TABLET | Refills: 0 | Status: SHIPPED | OUTPATIENT
Start: 2020-12-16 | End: 2021-01-05

## 2020-12-16 RX ADMIN — ERYTHROPOIETIN 60000 UNITS: 40000 INJECTION, SOLUTION INTRAVENOUS; SUBCUTANEOUS at 09:58

## 2020-12-16 RX ADMIN — FILGRASTIM 300 MCG: 300 INJECTION, SOLUTION INTRAVENOUS; SUBCUTANEOUS at 09:58

## 2020-12-18 DIAGNOSIS — M25.561 ACUTE PAIN OF RIGHT KNEE: ICD-10-CM

## 2020-12-18 DIAGNOSIS — F32.A ANXIETY AND DEPRESSION: ICD-10-CM

## 2020-12-18 DIAGNOSIS — F41.9 ANXIETY AND DEPRESSION: ICD-10-CM

## 2020-12-18 RX ORDER — GABAPENTIN 400 MG/1
CAPSULE ORAL
Qty: 120 CAPSULE | Refills: 2 | Status: SHIPPED | OUTPATIENT
Start: 2020-12-18 | End: 2021-01-01

## 2020-12-18 RX ORDER — DESVENLAFAXINE SUCCINATE 50 MG/1
TABLET, EXTENDED RELEASE ORAL
Qty: 90 TABLET | Refills: 1 | Status: SHIPPED | OUTPATIENT
Start: 2020-12-18 | End: 2021-01-01

## 2020-12-23 ENCOUNTER — LAB (OUTPATIENT)
Dept: LAB | Facility: HOSPITAL | Age: 80
End: 2020-12-23

## 2020-12-23 ENCOUNTER — INFUSION (OUTPATIENT)
Dept: ONCOLOGY | Facility: HOSPITAL | Age: 80
End: 2020-12-23

## 2020-12-23 VITALS — TEMPERATURE: 98.1 F

## 2020-12-23 DIAGNOSIS — D46.C MYELODYSPLASTIC SYNDROME WITH 5Q DELETION (HCC): ICD-10-CM

## 2020-12-23 DIAGNOSIS — D64.9 ANEMIA REQUIRING TRANSFUSIONS: Primary | ICD-10-CM

## 2020-12-23 DIAGNOSIS — D64.9 ANEMIA REQUIRING TRANSFUSIONS: ICD-10-CM

## 2020-12-23 DIAGNOSIS — N18.30 CKD STAGE 3 DUE TO TYPE 2 DIABETES MELLITUS (HCC): ICD-10-CM

## 2020-12-23 DIAGNOSIS — E11.22 CKD STAGE 3 DUE TO TYPE 2 DIABETES MELLITUS (HCC): ICD-10-CM

## 2020-12-23 LAB
BASOPHILS # BLD AUTO: 0.07 10*3/MM3 (ref 0–0.2)
BASOPHILS NFR BLD AUTO: 2.1 % (ref 0–1.5)
DEPRECATED RDW RBC AUTO: 77.1 FL (ref 37–54)
EOSINOPHIL # BLD AUTO: 0.19 10*3/MM3 (ref 0–0.4)
EOSINOPHIL NFR BLD AUTO: 5.6 % (ref 0.3–6.2)
ERYTHROCYTE [DISTWIDTH] IN BLOOD BY AUTOMATED COUNT: 21.4 % (ref 12.3–15.4)
HCT VFR BLD AUTO: 30.9 % (ref 34–46.6)
HGB BLD-MCNC: 9.2 G/DL (ref 12–15.9)
IMM GRANULOCYTES # BLD AUTO: 0.06 10*3/MM3 (ref 0–0.05)
IMM GRANULOCYTES NFR BLD AUTO: 1.8 % (ref 0–0.5)
LYMPHOCYTES # BLD AUTO: 1.49 10*3/MM3 (ref 0.7–3.1)
LYMPHOCYTES NFR BLD AUTO: 44.1 % (ref 19.6–45.3)
MCH RBC QN AUTO: 30.6 PG (ref 26.6–33)
MCHC RBC AUTO-ENTMCNC: 29.8 G/DL (ref 31.5–35.7)
MCV RBC AUTO: 102.7 FL (ref 79–97)
MONOCYTES # BLD AUTO: 0.23 10*3/MM3 (ref 0.1–0.9)
MONOCYTES NFR BLD AUTO: 6.8 % (ref 5–12)
NEUTROPHILS NFR BLD AUTO: 1.34 10*3/MM3 (ref 1.7–7)
NEUTROPHILS NFR BLD AUTO: 39.6 % (ref 42.7–76)
PLATELET # BLD AUTO: 225 10*3/MM3 (ref 140–450)
PMV BLD AUTO: 11.4 FL (ref 6–12)
RBC # BLD AUTO: 3.01 10*6/MM3 (ref 3.77–5.28)
WBC # BLD AUTO: 3.38 10*3/MM3 (ref 3.4–10.8)

## 2020-12-23 PROCEDURE — 25010000002 FILGRASTIM 300 MCG/0.5ML SOLUTION PREFILLED SYRINGE: Performed by: NURSE PRACTITIONER

## 2020-12-23 PROCEDURE — 96372 THER/PROPH/DIAG INJ SC/IM: CPT

## 2020-12-23 PROCEDURE — 85025 COMPLETE CBC W/AUTO DIFF WBC: CPT | Performed by: INTERNAL MEDICINE

## 2020-12-23 PROCEDURE — 36415 COLL VENOUS BLD VENIPUNCTURE: CPT

## 2020-12-23 PROCEDURE — 25010000002 EPOETIN ALFA PER 1000 UNITS: Performed by: INTERNAL MEDICINE

## 2020-12-23 RX ORDER — MIDODRINE HYDROCHLORIDE 2.5 MG/1
2.5 TABLET ORAL 3 TIMES DAILY
COMMUNITY
Start: 2020-12-02 | End: 2021-01-01

## 2020-12-23 RX ADMIN — FILGRASTIM 300 MCG: 300 INJECTION, SOLUTION INTRAVENOUS; SUBCUTANEOUS at 10:27

## 2020-12-23 RX ADMIN — ERYTHROPOIETIN 60000 UNITS: 40000 INJECTION, SOLUTION INTRAVENOUS; SUBCUTANEOUS at 10:24

## 2020-12-29 ENCOUNTER — TELEPHONE (OUTPATIENT)
Dept: INTERNAL MEDICINE | Facility: CLINIC | Age: 80
End: 2020-12-29

## 2020-12-29 RX ORDER — ALBUTEROL SULFATE 90 UG/1
AEROSOL, METERED RESPIRATORY (INHALATION)
Qty: 18 G | Refills: 3 | Status: ON HOLD | OUTPATIENT
Start: 2020-12-29 | End: 2022-01-01

## 2020-12-29 NOTE — TELEPHONE ENCOUNTER
PATIENT STATES:home health is calling for gel for right shoulder pain     PATIENT CAN BE REACHED ON:[phone]    PHARMACY [facility] [place], [place] - [address] [phone] [facility] [phone] FX

## 2020-12-30 ENCOUNTER — INFUSION (OUTPATIENT)
Dept: ONCOLOGY | Facility: HOSPITAL | Age: 80
End: 2020-12-30

## 2020-12-30 ENCOUNTER — LAB (OUTPATIENT)
Dept: LAB | Facility: HOSPITAL | Age: 80
End: 2020-12-30

## 2020-12-30 VITALS — TEMPERATURE: 98 F

## 2020-12-30 DIAGNOSIS — D64.9 ANEMIA REQUIRING TRANSFUSIONS: ICD-10-CM

## 2020-12-30 DIAGNOSIS — D46.C MYELODYSPLASTIC SYNDROME WITH 5Q DELETION (HCC): ICD-10-CM

## 2020-12-30 LAB
BASOPHILS # BLD AUTO: 0.05 10*3/MM3 (ref 0–0.2)
BASOPHILS NFR BLD AUTO: 1.7 % (ref 0–1.5)
DEPRECATED RDW RBC AUTO: 78.1 FL (ref 37–54)
EOSINOPHIL # BLD AUTO: 0.18 10*3/MM3 (ref 0–0.4)
EOSINOPHIL NFR BLD AUTO: 6 % (ref 0.3–6.2)
ERYTHROCYTE [DISTWIDTH] IN BLOOD BY AUTOMATED COUNT: 22.2 % (ref 12.3–15.4)
HCT VFR BLD AUTO: 32.5 % (ref 34–46.6)
HGB BLD-MCNC: 10.3 G/DL (ref 12–15.9)
IMM GRANULOCYTES # BLD AUTO: 0.03 10*3/MM3 (ref 0–0.05)
IMM GRANULOCYTES NFR BLD AUTO: 1 % (ref 0–0.5)
LYMPHOCYTES # BLD AUTO: 1.49 10*3/MM3 (ref 0.7–3.1)
LYMPHOCYTES NFR BLD AUTO: 50 % (ref 19.6–45.3)
MCH RBC QN AUTO: 31.2 PG (ref 26.6–33)
MCHC RBC AUTO-ENTMCNC: 31.7 G/DL (ref 31.5–35.7)
MCV RBC AUTO: 98.5 FL (ref 79–97)
MONOCYTES # BLD AUTO: 0.21 10*3/MM3 (ref 0.1–0.9)
MONOCYTES NFR BLD AUTO: 7 % (ref 5–12)
NEUTROPHILS NFR BLD AUTO: 1.02 10*3/MM3 (ref 1.7–7)
NEUTROPHILS NFR BLD AUTO: 34.3 % (ref 42.7–76)
NRBC BLD AUTO-RTO: 0 /100 WBC (ref 0–0.2)
PLATELET # BLD AUTO: 215 10*3/MM3 (ref 140–450)
PMV BLD AUTO: 12 FL (ref 6–12)
RBC # BLD AUTO: 3.3 10*6/MM3 (ref 3.77–5.28)
WBC # BLD AUTO: 2.98 10*3/MM3 (ref 3.4–10.8)

## 2020-12-30 PROCEDURE — 85025 COMPLETE CBC W/AUTO DIFF WBC: CPT | Performed by: INTERNAL MEDICINE

## 2020-12-30 PROCEDURE — G0463 HOSPITAL OUTPT CLINIC VISIT: HCPCS

## 2020-12-30 PROCEDURE — 36415 COLL VENOUS BLD VENIPUNCTURE: CPT

## 2020-12-30 NOTE — NURSING NOTE
Pt was informed that she does not need  procrit and neupogen injections today since her hgb is 10.3. However, the pt was instructed to follow neutropenic precautions since her ANC is lower today at 1.02. Nurse reviewed some of the precautions with the pt including staying away from others and those that are obviously ill and calling our office for s/s of infection or fever > 100.4 degrees fahrenheit. The pt v/u and will follow-up in one week as scheduled.

## 2021-01-01 ENCOUNTER — TELEPHONE (OUTPATIENT)
Dept: ONCOLOGY | Facility: CLINIC | Age: 81
End: 2021-01-01

## 2021-01-01 ENCOUNTER — HOSPITAL ENCOUNTER (OUTPATIENT)
Dept: GENERAL RADIOLOGY | Facility: HOSPITAL | Age: 81
Discharge: HOME | End: 2021-06-30

## 2021-01-01 ENCOUNTER — INFUSION (OUTPATIENT)
Dept: ONCOLOGY | Facility: HOSPITAL | Age: 81
End: 2021-01-01

## 2021-01-01 ENCOUNTER — OFFICE VISIT (OUTPATIENT)
Dept: GASTROENTEROLOGY | Facility: CLINIC | Age: 81
End: 2021-01-01

## 2021-01-01 ENCOUNTER — APPOINTMENT (OUTPATIENT)
Dept: CARDIOLOGY | Facility: HOSPITAL | Age: 81
End: 2021-01-01

## 2021-01-01 ENCOUNTER — TELEPHONE (OUTPATIENT)
Dept: INTERNAL MEDICINE | Facility: CLINIC | Age: 81
End: 2021-01-01

## 2021-01-01 ENCOUNTER — HOSPITAL ENCOUNTER (INPATIENT)
Facility: HOSPITAL | Age: 81
LOS: 6 days | Discharge: NURSING, REHAB OR LONG-TERM CARE | End: 2021-08-12
Attending: EMERGENCY MEDICINE | Admitting: INTERNAL MEDICINE

## 2021-01-01 ENCOUNTER — HOSPITAL ENCOUNTER (OUTPATIENT)
Dept: NUCLEAR MEDICINE | Facility: HOSPITAL | Age: 81
Discharge: HOME | End: 2021-12-02

## 2021-01-01 ENCOUNTER — HOSPITAL ENCOUNTER (OUTPATIENT)
Dept: INFUSION THERAPY | Facility: HOSPITAL | Age: 81
Discharge: HOME | End: 2021-04-15
Admitting: INTERNAL MEDICINE

## 2021-01-01 ENCOUNTER — TELEPHONE (OUTPATIENT)
Dept: ORTHOPEDIC SURGERY | Facility: CLINIC | Age: 81
End: 2021-01-01

## 2021-01-01 ENCOUNTER — APPOINTMENT (OUTPATIENT)
Dept: NUCLEAR MEDICINE | Facility: HOSPITAL | Age: 81
End: 2021-01-01

## 2021-01-01 ENCOUNTER — APPOINTMENT (OUTPATIENT)
Dept: CT IMAGING | Facility: HOSPITAL | Age: 81
End: 2021-01-01

## 2021-01-01 ENCOUNTER — HOSPITAL ENCOUNTER (OUTPATIENT)
Dept: INFUSION THERAPY | Facility: HOSPITAL | Age: 81
Discharge: HOME | End: 2021-11-11
Admitting: INTERNAL MEDICINE

## 2021-01-01 ENCOUNTER — HOSPITAL ENCOUNTER (OUTPATIENT)
Dept: INFUSION THERAPY | Facility: HOSPITAL | Age: 81
Setting detail: INFUSION SERIES
Discharge: HOME | End: 2021-07-29

## 2021-01-01 ENCOUNTER — OFFICE VISIT (OUTPATIENT)
Dept: INTERNAL MEDICINE | Facility: CLINIC | Age: 81
End: 2021-01-01

## 2021-01-01 ENCOUNTER — APPOINTMENT (OUTPATIENT)
Dept: GENERAL RADIOLOGY | Facility: HOSPITAL | Age: 81
End: 2021-01-01

## 2021-01-01 ENCOUNTER — ANESTHESIA (OUTPATIENT)
Dept: PERIOP | Facility: HOSPITAL | Age: 81
End: 2021-01-01

## 2021-01-01 ENCOUNTER — APPOINTMENT (OUTPATIENT)
Dept: ONCOLOGY | Facility: HOSPITAL | Age: 81
End: 2021-01-01

## 2021-01-01 ENCOUNTER — HOSPITAL ENCOUNTER (OUTPATIENT)
Dept: NUCLEAR MEDICINE | Facility: HOSPITAL | Age: 81
End: 2021-01-01

## 2021-01-01 ENCOUNTER — ANESTHESIA EVENT (OUTPATIENT)
Dept: PERIOP | Facility: HOSPITAL | Age: 81
End: 2021-01-01

## 2021-01-01 ENCOUNTER — HOSPITAL ENCOUNTER (OUTPATIENT)
Dept: NUCLEAR MEDICINE | Facility: HOSPITAL | Age: 81
Discharge: HOME | End: 2021-10-19

## 2021-01-01 ENCOUNTER — HOSPITAL ENCOUNTER (OUTPATIENT)
Dept: INFUSION THERAPY | Facility: HOSPITAL | Age: 81
Discharge: HOME | End: 2021-09-15

## 2021-01-01 ENCOUNTER — LAB (OUTPATIENT)
Dept: LAB | Facility: HOSPITAL | Age: 81
End: 2021-01-01

## 2021-01-01 ENCOUNTER — HOSPITAL ENCOUNTER (OUTPATIENT)
Dept: INFUSION THERAPY | Facility: HOSPITAL | Age: 81
Discharge: HOME | End: 2021-05-21
Admitting: INTERNAL MEDICINE

## 2021-01-01 ENCOUNTER — TELEPHONE (OUTPATIENT)
Dept: ONCOLOGY | Facility: OTHER | Age: 81
End: 2021-01-01

## 2021-01-01 ENCOUNTER — APPOINTMENT (OUTPATIENT)
Dept: ULTRASOUND IMAGING | Facility: HOSPITAL | Age: 81
End: 2021-01-01

## 2021-01-01 ENCOUNTER — HOSPITAL ENCOUNTER (EMERGENCY)
Facility: HOSPITAL | Age: 81
Discharge: NURSING, REHAB OR LONG-TERM CARE | End: 2021-10-18
Attending: EMERGENCY MEDICINE | Admitting: EMERGENCY MEDICINE

## 2021-01-01 ENCOUNTER — HOSPITAL ENCOUNTER (EMERGENCY)
Facility: HOSPITAL | Age: 81
Discharge: HOME | End: 2021-09-09
Attending: EMERGENCY MEDICINE | Admitting: EMERGENCY MEDICINE

## 2021-01-01 ENCOUNTER — HOSPITAL ENCOUNTER (OUTPATIENT)
Dept: INFUSION THERAPY | Facility: HOSPITAL | Age: 81
Setting detail: INFUSION SERIES
Discharge: HOME | End: 2021-12-22

## 2021-01-01 ENCOUNTER — APPOINTMENT (OUTPATIENT)
Dept: LAB | Facility: HOSPITAL | Age: 81
End: 2021-01-01

## 2021-01-01 ENCOUNTER — OFFICE VISIT (OUTPATIENT)
Dept: CARDIOLOGY | Facility: CLINIC | Age: 81
End: 2021-01-01

## 2021-01-01 ENCOUNTER — DOCUMENTATION (OUTPATIENT)
Dept: INTERNAL MEDICINE | Facility: CLINIC | Age: 81
End: 2021-01-01

## 2021-01-01 ENCOUNTER — HOSPITAL ENCOUNTER (OUTPATIENT)
Dept: INFUSION THERAPY | Facility: HOSPITAL | Age: 81
Discharge: HOME | End: 2021-03-25
Admitting: INTERNAL MEDICINE

## 2021-01-01 ENCOUNTER — HOSPITAL ENCOUNTER (OUTPATIENT)
Dept: INFUSION THERAPY | Facility: HOSPITAL | Age: 81
Discharge: HOME | End: 2021-06-10
Admitting: INTERNAL MEDICINE

## 2021-01-01 ENCOUNTER — HOSPITAL ENCOUNTER (OUTPATIENT)
Dept: GENERAL RADIOLOGY | Facility: HOSPITAL | Age: 81
Discharge: HOME | End: 2021-08-04

## 2021-01-01 ENCOUNTER — IMMUNIZATION (OUTPATIENT)
Dept: VACCINE CLINIC | Facility: HOSPITAL | Age: 81
End: 2021-01-01

## 2021-01-01 ENCOUNTER — APPOINTMENT (OUTPATIENT)
Dept: VACCINE CLINIC | Facility: HOSPITAL | Age: 81
End: 2021-01-01

## 2021-01-01 ENCOUNTER — OFFICE VISIT (OUTPATIENT)
Dept: ONCOLOGY | Facility: CLINIC | Age: 81
End: 2021-01-01

## 2021-01-01 ENCOUNTER — HOSPITAL ENCOUNTER (INPATIENT)
Facility: HOSPITAL | Age: 81
LOS: 3 days | Discharge: NURSING, REHAB OR LONG-TERM CARE | End: 2021-08-30
Attending: EMERGENCY MEDICINE | Admitting: INTERNAL MEDICINE

## 2021-01-01 ENCOUNTER — TELEPHONE (OUTPATIENT)
Dept: GASTROENTEROLOGY | Facility: CLINIC | Age: 81
End: 2021-01-01

## 2021-01-01 ENCOUNTER — HOSPITAL ENCOUNTER (OUTPATIENT)
Dept: INFUSION THERAPY | Facility: HOSPITAL | Age: 81
Setting detail: INFUSION SERIES
Discharge: HOME | End: 2021-10-19

## 2021-01-01 ENCOUNTER — OFFICE VISIT (OUTPATIENT)
Dept: ORTHOPEDIC SURGERY | Facility: CLINIC | Age: 81
End: 2021-01-01

## 2021-01-01 ENCOUNTER — TELEPHONE (OUTPATIENT)
Dept: CARDIOLOGY | Facility: CLINIC | Age: 81
End: 2021-01-01

## 2021-01-01 ENCOUNTER — HOSPITAL ENCOUNTER (OUTPATIENT)
Dept: GENERAL RADIOLOGY | Facility: HOSPITAL | Age: 81
Discharge: HOME | End: 2021-03-29
Admitting: INTERNAL MEDICINE

## 2021-01-01 ENCOUNTER — HOSPITAL ENCOUNTER (OUTPATIENT)
Facility: HOSPITAL | Age: 81
Setting detail: HOSPITAL OUTPATIENT SURGERY
Discharge: HOME | End: 2021-05-13
Attending: INTERNAL MEDICINE | Admitting: INTERNAL MEDICINE

## 2021-01-01 ENCOUNTER — HOSPITAL ENCOUNTER (OUTPATIENT)
Dept: CARDIOLOGY | Facility: HOSPITAL | Age: 81
Discharge: HOME | End: 2021-12-02

## 2021-01-01 ENCOUNTER — TRANSCRIBE ORDERS (OUTPATIENT)
Dept: ADMINISTRATIVE | Facility: HOSPITAL | Age: 81
End: 2021-01-01

## 2021-01-01 ENCOUNTER — OUTSIDE FACILITY SERVICE (OUTPATIENT)
Dept: INTERNAL MEDICINE | Facility: CLINIC | Age: 81
End: 2021-01-01

## 2021-01-01 ENCOUNTER — HOSPITAL ENCOUNTER (OUTPATIENT)
Dept: MRI IMAGING | Facility: HOSPITAL | Age: 81
Discharge: HOME | End: 2021-07-23
Admitting: NURSE PRACTITIONER

## 2021-01-01 ENCOUNTER — DOCUMENTATION (OUTPATIENT)
Dept: ONCOLOGY | Facility: HOSPITAL | Age: 81
End: 2021-01-01

## 2021-01-01 ENCOUNTER — DOCUMENTATION (OUTPATIENT)
Dept: ONCOLOGY | Facility: CLINIC | Age: 81
End: 2021-01-01

## 2021-01-01 ENCOUNTER — HOSPITAL ENCOUNTER (OUTPATIENT)
Dept: INFUSION THERAPY | Facility: HOSPITAL | Age: 81
Discharge: HOME | End: 2021-05-14

## 2021-01-01 ENCOUNTER — TELEPHONE (OUTPATIENT)
Dept: ONCOLOGY | Facility: HOSPITAL | Age: 81
End: 2021-01-01

## 2021-01-01 ENCOUNTER — HOSPITAL ENCOUNTER (EMERGENCY)
Facility: HOSPITAL | Age: 81
Discharge: HOME | End: 2021-09-14
Attending: EMERGENCY MEDICINE | Admitting: EMERGENCY MEDICINE

## 2021-01-01 ENCOUNTER — HOSPITAL ENCOUNTER (OUTPATIENT)
Dept: INFUSION THERAPY | Facility: HOSPITAL | Age: 81
Discharge: HOME | End: 2021-09-16
Admitting: INTERNAL MEDICINE

## 2021-01-01 ENCOUNTER — DOCUMENTATION (OUTPATIENT)
Dept: CARDIOLOGY | Facility: CLINIC | Age: 81
End: 2021-01-01

## 2021-01-01 VITALS
TEMPERATURE: 96.9 F | OXYGEN SATURATION: 98 % | HEIGHT: 62 IN | SYSTOLIC BLOOD PRESSURE: 112 MMHG | BODY MASS INDEX: 36.8 KG/M2 | RESPIRATION RATE: 16 BRPM | HEART RATE: 68 BPM | DIASTOLIC BLOOD PRESSURE: 58 MMHG | WEIGHT: 200 LBS

## 2021-01-01 VITALS
SYSTOLIC BLOOD PRESSURE: 130 MMHG | OXYGEN SATURATION: 92 % | DIASTOLIC BLOOD PRESSURE: 80 MMHG | BODY MASS INDEX: 34.71 KG/M2 | RESPIRATION RATE: 16 BRPM | TEMPERATURE: 97.1 F | HEIGHT: 62 IN | WEIGHT: 188.6 LBS | HEART RATE: 72 BPM

## 2021-01-01 VITALS
BODY MASS INDEX: 34.5 KG/M2 | RESPIRATION RATE: 18 BRPM | HEART RATE: 57 BPM | HEIGHT: 62 IN | DIASTOLIC BLOOD PRESSURE: 61 MMHG | OXYGEN SATURATION: 96 % | TEMPERATURE: 98 F | SYSTOLIC BLOOD PRESSURE: 141 MMHG | WEIGHT: 187.5 LBS

## 2021-01-01 VITALS — TEMPERATURE: 97.5 F

## 2021-01-01 VITALS
HEIGHT: 62 IN | TEMPERATURE: 97.1 F | OXYGEN SATURATION: 93 % | DIASTOLIC BLOOD PRESSURE: 78 MMHG | BODY MASS INDEX: 37.17 KG/M2 | WEIGHT: 202 LBS | HEART RATE: 79 BPM | RESPIRATION RATE: 18 BRPM | SYSTOLIC BLOOD PRESSURE: 130 MMHG

## 2021-01-01 VITALS
BODY MASS INDEX: 36.36 KG/M2 | DIASTOLIC BLOOD PRESSURE: 60 MMHG | RESPIRATION RATE: 16 BRPM | HEIGHT: 62 IN | SYSTOLIC BLOOD PRESSURE: 100 MMHG | TEMPERATURE: 97.1 F | OXYGEN SATURATION: 92 % | HEART RATE: 75 BPM | WEIGHT: 197.6 LBS

## 2021-01-01 VITALS
RESPIRATION RATE: 16 BRPM | TEMPERATURE: 98 F | WEIGHT: 161 LBS | SYSTOLIC BLOOD PRESSURE: 158 MMHG | OXYGEN SATURATION: 100 % | HEART RATE: 62 BPM | BODY MASS INDEX: 30.4 KG/M2 | DIASTOLIC BLOOD PRESSURE: 57 MMHG | HEIGHT: 61 IN

## 2021-01-01 VITALS
OXYGEN SATURATION: 100 % | RESPIRATION RATE: 18 BRPM | SYSTOLIC BLOOD PRESSURE: 148 MMHG | DIASTOLIC BLOOD PRESSURE: 80 MMHG | TEMPERATURE: 97.5 F | BODY MASS INDEX: 32.02 KG/M2 | HEART RATE: 54 BPM | HEIGHT: 62 IN | WEIGHT: 174 LBS

## 2021-01-01 VITALS
HEIGHT: 61 IN | BODY MASS INDEX: 32.85 KG/M2 | DIASTOLIC BLOOD PRESSURE: 74 MMHG | OXYGEN SATURATION: 99 % | RESPIRATION RATE: 16 BRPM | HEART RATE: 73 BPM | SYSTOLIC BLOOD PRESSURE: 151 MMHG | WEIGHT: 174 LBS | TEMPERATURE: 98.1 F

## 2021-01-01 VITALS
RESPIRATION RATE: 19 BRPM | OXYGEN SATURATION: 94 % | WEIGHT: 192.3 LBS | DIASTOLIC BLOOD PRESSURE: 75 MMHG | TEMPERATURE: 96.8 F | BODY MASS INDEX: 35.39 KG/M2 | HEIGHT: 62 IN | SYSTOLIC BLOOD PRESSURE: 120 MMHG | HEART RATE: 90 BPM

## 2021-01-01 VITALS
BODY MASS INDEX: 34.45 KG/M2 | HEART RATE: 84 BPM | DIASTOLIC BLOOD PRESSURE: 60 MMHG | RESPIRATION RATE: 16 BRPM | WEIGHT: 188.4 LBS | SYSTOLIC BLOOD PRESSURE: 133 MMHG | OXYGEN SATURATION: 93 % | TEMPERATURE: 97.2 F

## 2021-01-01 VITALS — TEMPERATURE: 96.8 F | TEMPERATURE: 98 F

## 2021-01-01 VITALS
DIASTOLIC BLOOD PRESSURE: 55 MMHG | OXYGEN SATURATION: 99 % | HEART RATE: 86 BPM | HEIGHT: 61 IN | TEMPERATURE: 97.3 F | BODY MASS INDEX: 36.94 KG/M2 | SYSTOLIC BLOOD PRESSURE: 133 MMHG | DIASTOLIC BLOOD PRESSURE: 60 MMHG | SYSTOLIC BLOOD PRESSURE: 92 MMHG | BODY MASS INDEX: 33.25 KG/M2 | HEIGHT: 62 IN | HEART RATE: 81 BPM | RESPIRATION RATE: 18 BRPM | OXYGEN SATURATION: 94 % | RESPIRATION RATE: 16 BRPM

## 2021-01-01 VITALS
DIASTOLIC BLOOD PRESSURE: 58 MMHG | BODY MASS INDEX: 36.58 KG/M2 | WEIGHT: 200 LBS | OXYGEN SATURATION: 94 % | HEART RATE: 81 BPM | SYSTOLIC BLOOD PRESSURE: 126 MMHG | TEMPERATURE: 98.6 F | RESPIRATION RATE: 16 BRPM

## 2021-01-01 VITALS
HEART RATE: 97 BPM | SYSTOLIC BLOOD PRESSURE: 113 MMHG | OXYGEN SATURATION: 100 % | WEIGHT: 176 LBS | HEIGHT: 61 IN | TEMPERATURE: 97.8 F | BODY MASS INDEX: 33.23 KG/M2 | RESPIRATION RATE: 20 BRPM | DIASTOLIC BLOOD PRESSURE: 63 MMHG

## 2021-01-01 VITALS
DIASTOLIC BLOOD PRESSURE: 98 MMHG | RESPIRATION RATE: 16 BRPM | HEIGHT: 62 IN | SYSTOLIC BLOOD PRESSURE: 120 MMHG | OXYGEN SATURATION: 95 % | HEART RATE: 88 BPM | WEIGHT: 200.8 LBS | BODY MASS INDEX: 36.95 KG/M2 | TEMPERATURE: 98 F

## 2021-01-01 VITALS
DIASTOLIC BLOOD PRESSURE: 73 MMHG | TEMPERATURE: 98.3 F | HEART RATE: 76 BPM | SYSTOLIC BLOOD PRESSURE: 157 MMHG | OXYGEN SATURATION: 100 % | RESPIRATION RATE: 16 BRPM

## 2021-01-01 VITALS
DIASTOLIC BLOOD PRESSURE: 76 MMHG | SYSTOLIC BLOOD PRESSURE: 110 MMHG | WEIGHT: 171 LBS | BODY MASS INDEX: 31.47 KG/M2 | HEIGHT: 62 IN

## 2021-01-01 VITALS — BODY MASS INDEX: 36.8 KG/M2 | HEIGHT: 62 IN | WEIGHT: 200 LBS

## 2021-01-01 VITALS
SYSTOLIC BLOOD PRESSURE: 149 MMHG | DIASTOLIC BLOOD PRESSURE: 78 MMHG | TEMPERATURE: 98.2 F | RESPIRATION RATE: 16 BRPM | HEART RATE: 90 BPM | OXYGEN SATURATION: 97 %

## 2021-01-01 VITALS
WEIGHT: 198.7 LBS | HEART RATE: 91 BPM | TEMPERATURE: 97.1 F | SYSTOLIC BLOOD PRESSURE: 120 MMHG | DIASTOLIC BLOOD PRESSURE: 68 MMHG | RESPIRATION RATE: 16 BRPM | BODY MASS INDEX: 36.57 KG/M2 | HEIGHT: 62 IN | OXYGEN SATURATION: 98 %

## 2021-01-01 VITALS — BODY MASS INDEX: 33.68 KG/M2 | HEIGHT: 62 IN | WEIGHT: 183 LBS

## 2021-01-01 VITALS
HEART RATE: 81 BPM | HEIGHT: 62 IN | OXYGEN SATURATION: 99 % | DIASTOLIC BLOOD PRESSURE: 60 MMHG | BODY MASS INDEX: 33.68 KG/M2 | WEIGHT: 183 LBS | SYSTOLIC BLOOD PRESSURE: 110 MMHG

## 2021-01-01 VITALS
HEART RATE: 74 BPM | TEMPERATURE: 97.3 F | BODY MASS INDEX: 36.13 KG/M2 | OXYGEN SATURATION: 96 % | RESPIRATION RATE: 14 BRPM | DIASTOLIC BLOOD PRESSURE: 62 MMHG | HEIGHT: 62 IN | SYSTOLIC BLOOD PRESSURE: 112 MMHG

## 2021-01-01 VITALS
TEMPERATURE: 97.7 F | HEIGHT: 61 IN | HEART RATE: 65 BPM | WEIGHT: 188.6 LBS | SYSTOLIC BLOOD PRESSURE: 130 MMHG | BODY MASS INDEX: 35.61 KG/M2 | DIASTOLIC BLOOD PRESSURE: 63 MMHG | TEMPERATURE: 98 F | RESPIRATION RATE: 17 BRPM | OXYGEN SATURATION: 90 %

## 2021-01-01 VITALS
BODY MASS INDEX: 30.25 KG/M2 | WEIGHT: 181.56 LBS | HEART RATE: 84 BPM | DIASTOLIC BLOOD PRESSURE: 56 MMHG | TEMPERATURE: 97.1 F | SYSTOLIC BLOOD PRESSURE: 132 MMHG | HEIGHT: 65 IN | RESPIRATION RATE: 16 BRPM | OXYGEN SATURATION: 90 %

## 2021-01-01 VITALS
OXYGEN SATURATION: 91 % | DIASTOLIC BLOOD PRESSURE: 80 MMHG | HEIGHT: 62 IN | BODY MASS INDEX: 35.16 KG/M2 | RESPIRATION RATE: 18 BRPM | TEMPERATURE: 96.9 F | HEART RATE: 88 BPM | SYSTOLIC BLOOD PRESSURE: 140 MMHG

## 2021-01-01 VITALS — TEMPERATURE: 97.4 F

## 2021-01-01 VITALS
DIASTOLIC BLOOD PRESSURE: 66 MMHG | TEMPERATURE: 97.6 F | OXYGEN SATURATION: 95 % | SYSTOLIC BLOOD PRESSURE: 112 MMHG | HEART RATE: 82 BPM | RESPIRATION RATE: 16 BRPM

## 2021-01-01 VITALS — HEIGHT: 62 IN | WEIGHT: 204.6 LBS | BODY MASS INDEX: 37.65 KG/M2 | TEMPERATURE: 97.5 F

## 2021-01-01 VITALS
DIASTOLIC BLOOD PRESSURE: 64 MMHG | HEART RATE: 87 BPM | TEMPERATURE: 97.7 F | RESPIRATION RATE: 16 BRPM | SYSTOLIC BLOOD PRESSURE: 118 MMHG | OXYGEN SATURATION: 96 % | BODY MASS INDEX: 36.39 KG/M2 | WEIGHT: 199 LBS

## 2021-01-01 VITALS
BODY MASS INDEX: 36.58 KG/M2 | RESPIRATION RATE: 16 BRPM | DIASTOLIC BLOOD PRESSURE: 66 MMHG | TEMPERATURE: 98 F | SYSTOLIC BLOOD PRESSURE: 148 MMHG | HEART RATE: 84 BPM | WEIGHT: 200 LBS | OXYGEN SATURATION: 95 %

## 2021-01-01 VITALS — TEMPERATURE: 96.9 F

## 2021-01-01 VITALS — TEMPERATURE: 97.7 F

## 2021-01-01 VITALS — TEMPERATURE: 98.3 F

## 2021-01-01 VITALS — TEMPERATURE: 97.1 F

## 2021-01-01 DIAGNOSIS — D46.C MYELODYSPLASTIC SYNDROME WITH 5Q DELETION (HCC): ICD-10-CM

## 2021-01-01 DIAGNOSIS — E11.22 CKD STAGE 3 DUE TO TYPE 2 DIABETES MELLITUS (HCC): ICD-10-CM

## 2021-01-01 DIAGNOSIS — N18.30 CKD STAGE 3 DUE TO TYPE 2 DIABETES MELLITUS (HCC): ICD-10-CM

## 2021-01-01 DIAGNOSIS — D47.2 MONOCLONAL GAMMOPATHY OF UNKNOWN SIGNIFICANCE (MGUS): ICD-10-CM

## 2021-01-01 DIAGNOSIS — U07.1 PNEUMONIA DUE TO COVID-19 VIRUS: Primary | ICD-10-CM

## 2021-01-01 DIAGNOSIS — G89.29 CHRONIC RADICULAR CERVICAL PAIN: ICD-10-CM

## 2021-01-01 DIAGNOSIS — R13.10 PILL DYSPHAGIA: Primary | ICD-10-CM

## 2021-01-01 DIAGNOSIS — T17.900S PULMONARY ASPIRATION, SEQUELA: ICD-10-CM

## 2021-01-01 DIAGNOSIS — D63.0 ANEMIA IN NEOPLASTIC DISEASE: Primary | ICD-10-CM

## 2021-01-01 DIAGNOSIS — T17.308A CHOKING, INITIAL ENCOUNTER: ICD-10-CM

## 2021-01-01 DIAGNOSIS — D64.9 ANEMIA REQUIRING TRANSFUSIONS: ICD-10-CM

## 2021-01-01 DIAGNOSIS — R05.3 CHRONIC COUGH: ICD-10-CM

## 2021-01-01 DIAGNOSIS — E11.65 UNCONTROLLED TYPE 2 DIABETES MELLITUS WITH HYPERGLYCEMIA (HCC): ICD-10-CM

## 2021-01-01 DIAGNOSIS — D46.C MYELODYSPLASTIC SYNDROME WITH 5Q DELETION (HCC): Primary | ICD-10-CM

## 2021-01-01 DIAGNOSIS — R10.13 EPIGASTRIC PAIN: ICD-10-CM

## 2021-01-01 DIAGNOSIS — I10 ESSENTIAL HYPERTENSION: ICD-10-CM

## 2021-01-01 DIAGNOSIS — Z01.818 OTHER SPECIFIED PRE-OPERATIVE EXAMINATION: ICD-10-CM

## 2021-01-01 DIAGNOSIS — R94.39 ABNORMAL STRESS TEST: ICD-10-CM

## 2021-01-01 DIAGNOSIS — R05.9 COUGH: ICD-10-CM

## 2021-01-01 DIAGNOSIS — I95.1 ORTHOSTATIC HYPOTENSION: ICD-10-CM

## 2021-01-01 DIAGNOSIS — R13.19 ESOPHAGEAL DYSPHAGIA: ICD-10-CM

## 2021-01-01 DIAGNOSIS — D64.9 ANEMIA REQUIRING TRANSFUSIONS: Primary | ICD-10-CM

## 2021-01-01 DIAGNOSIS — D64.9 CHRONIC ANEMIA: Primary | ICD-10-CM

## 2021-01-01 DIAGNOSIS — E03.9 ACQUIRED HYPOTHYROIDISM: ICD-10-CM

## 2021-01-01 DIAGNOSIS — E11.40 TYPE 2 DIABETES MELLITUS WITH DIABETIC NEUROPATHY, WITH LONG-TERM CURRENT USE OF INSULIN (HCC): ICD-10-CM

## 2021-01-01 DIAGNOSIS — U07.1 COVID-19: ICD-10-CM

## 2021-01-01 DIAGNOSIS — R07.2 PRECORDIAL PAIN: ICD-10-CM

## 2021-01-01 DIAGNOSIS — D63.0 ANEMIA IN NEOPLASTIC DISEASE: ICD-10-CM

## 2021-01-01 DIAGNOSIS — Z86.718 HISTORY OF DEEP VENOUS THROMBOSIS (DVT) OF DISTAL VEIN OF LEFT LOWER EXTREMITY: ICD-10-CM

## 2021-01-01 DIAGNOSIS — I50.32 CHRONIC DIASTOLIC CHF (CONGESTIVE HEART FAILURE) (HCC): Primary | ICD-10-CM

## 2021-01-01 DIAGNOSIS — Z79.4 TYPE 2 DIABETES MELLITUS WITH DIABETIC NEUROPATHY, WITH LONG-TERM CURRENT USE OF INSULIN (HCC): ICD-10-CM

## 2021-01-01 DIAGNOSIS — M19.012 PRIMARY LOCALIZED OSTEOARTHROSIS OF LEFT SHOULDER REGION: ICD-10-CM

## 2021-01-01 DIAGNOSIS — R52 PAIN: Primary | ICD-10-CM

## 2021-01-01 DIAGNOSIS — M54.12 CHRONIC RADICULAR CERVICAL PAIN: ICD-10-CM

## 2021-01-01 DIAGNOSIS — J69.0 ASPIRATION PNEUMONIA OF LEFT LUNG, UNSPECIFIED ASPIRATION PNEUMONIA TYPE, UNSPECIFIED PART OF LUNG (HCC): ICD-10-CM

## 2021-01-01 DIAGNOSIS — D64.9 CHRONIC ANEMIA: ICD-10-CM

## 2021-01-01 DIAGNOSIS — R42 DIZZINESS: ICD-10-CM

## 2021-01-01 DIAGNOSIS — R60.0 BILATERAL LEG EDEMA: ICD-10-CM

## 2021-01-01 DIAGNOSIS — K21.00 GASTROESOPHAGEAL REFLUX DISEASE WITH ESOPHAGITIS WITHOUT HEMORRHAGE: ICD-10-CM

## 2021-01-01 DIAGNOSIS — M25.511 CHRONIC RIGHT SHOULDER PAIN: ICD-10-CM

## 2021-01-01 DIAGNOSIS — R60.0 BILATERAL LEG EDEMA: Primary | ICD-10-CM

## 2021-01-01 DIAGNOSIS — M75.52 SUBACROMIAL BURSITIS OF LEFT SHOULDER JOINT: ICD-10-CM

## 2021-01-01 DIAGNOSIS — U07.1 COVID-19: Primary | ICD-10-CM

## 2021-01-01 DIAGNOSIS — J12.82 PNEUMONIA DUE TO COVID-19 VIRUS: Primary | ICD-10-CM

## 2021-01-01 DIAGNOSIS — M25.561 ACUTE PAIN OF RIGHT KNEE: ICD-10-CM

## 2021-01-01 DIAGNOSIS — M50.30 DEGENERATIVE DISC DISEASE, CERVICAL: ICD-10-CM

## 2021-01-01 DIAGNOSIS — R19.7 DIARRHEA, UNSPECIFIED TYPE: ICD-10-CM

## 2021-01-01 DIAGNOSIS — G89.29 CHRONIC RIGHT SHOULDER PAIN: ICD-10-CM

## 2021-01-01 DIAGNOSIS — B37.2 CUTANEOUS CANDIDIASIS: ICD-10-CM

## 2021-01-01 DIAGNOSIS — K21.9 GASTROESOPHAGEAL REFLUX DISEASE, UNSPECIFIED WHETHER ESOPHAGITIS PRESENT: ICD-10-CM

## 2021-01-01 DIAGNOSIS — I50.32 CHRONIC DIASTOLIC CHF (CONGESTIVE HEART FAILURE) (HCC): ICD-10-CM

## 2021-01-01 DIAGNOSIS — Z79.4 TYPE 2 DIABETES MELLITUS WITH DIABETIC NEUROPATHY, WITH LONG-TERM CURRENT USE OF INSULIN (HCC): Primary | ICD-10-CM

## 2021-01-01 DIAGNOSIS — R06.2 WHEEZING: ICD-10-CM

## 2021-01-01 DIAGNOSIS — I48.92 ATRIAL FLUTTER, UNSPECIFIED TYPE (HCC): Primary | ICD-10-CM

## 2021-01-01 DIAGNOSIS — G62.9 PERIPHERAL POLYNEUROPATHY: ICD-10-CM

## 2021-01-01 DIAGNOSIS — T17.900S PULMONARY ASPIRATION, SEQUELA: Primary | ICD-10-CM

## 2021-01-01 DIAGNOSIS — M54.12 CHRONIC RADICULAR CERVICAL PAIN: Primary | ICD-10-CM

## 2021-01-01 DIAGNOSIS — N17.9 AKI (ACUTE KIDNEY INJURY) (HCC): ICD-10-CM

## 2021-01-01 DIAGNOSIS — R13.12 OROPHARYNGEAL DYSPHAGIA: ICD-10-CM

## 2021-01-01 DIAGNOSIS — R11.14 BILIOUS VOMITING WITH NAUSEA: Primary | ICD-10-CM

## 2021-01-01 DIAGNOSIS — E11.40 TYPE 2 DIABETES MELLITUS WITH DIABETIC NEUROPATHY, WITH LONG-TERM CURRENT USE OF INSULIN (HCC): Primary | ICD-10-CM

## 2021-01-01 DIAGNOSIS — I48.3 TYPICAL ATRIAL FLUTTER (HCC): ICD-10-CM

## 2021-01-01 DIAGNOSIS — N34.3 DYSURIA-FREQUENCY SYNDROME: ICD-10-CM

## 2021-01-01 DIAGNOSIS — R42 DIZZY: ICD-10-CM

## 2021-01-01 DIAGNOSIS — M10.9 ACUTE GOUT OF RIGHT HAND, UNSPECIFIED CAUSE: ICD-10-CM

## 2021-01-01 DIAGNOSIS — N17.9 ACUTE KIDNEY INJURY (HCC): ICD-10-CM

## 2021-01-01 DIAGNOSIS — F41.9 ANXIETY AND DEPRESSION: ICD-10-CM

## 2021-01-01 DIAGNOSIS — D64.9 ACUTE ON CHRONIC ANEMIA: ICD-10-CM

## 2021-01-01 DIAGNOSIS — I10 ESSENTIAL HYPERTENSION: Chronic | ICD-10-CM

## 2021-01-01 DIAGNOSIS — J18.9 PNEUMONIA OF LEFT LOWER LOBE DUE TO INFECTIOUS ORGANISM: ICD-10-CM

## 2021-01-01 DIAGNOSIS — F41.8 DEPRESSION WITH ANXIETY: ICD-10-CM

## 2021-01-01 DIAGNOSIS — F32.A ANXIETY AND DEPRESSION: ICD-10-CM

## 2021-01-01 DIAGNOSIS — S09.90XA CLOSED HEAD INJURY WITHOUT CONCUSSION, INITIAL ENCOUNTER: Primary | ICD-10-CM

## 2021-01-01 DIAGNOSIS — Z01.818 OTHER SPECIFIED PRE-OPERATIVE EXAMINATION: Primary | ICD-10-CM

## 2021-01-01 DIAGNOSIS — J69.0 ASPIRATION PNEUMONIA OF RIGHT UPPER LOBE, UNSPECIFIED ASPIRATION PNEUMONIA TYPE (HCC): Primary | ICD-10-CM

## 2021-01-01 DIAGNOSIS — L98.9 SKIN LESION OF BACK: Primary | ICD-10-CM

## 2021-01-01 DIAGNOSIS — R82.998 LEUKOCYTES IN URINE: ICD-10-CM

## 2021-01-01 DIAGNOSIS — D64.9 ANEMIA, UNSPECIFIED TYPE: ICD-10-CM

## 2021-01-01 DIAGNOSIS — L98.9 SKIN LESION: ICD-10-CM

## 2021-01-01 DIAGNOSIS — K21.00 GASTROESOPHAGEAL REFLUX DISEASE WITH ESOPHAGITIS WITHOUT HEMORRHAGE: Primary | ICD-10-CM

## 2021-01-01 DIAGNOSIS — M19.012 PRIMARY LOCALIZED OSTEOARTHROSIS OF LEFT SHOULDER REGION: Primary | ICD-10-CM

## 2021-01-01 DIAGNOSIS — D46.9 MYELODYSPLASIA (MYELODYSPLASTIC SYNDROME) (HCC): Primary | ICD-10-CM

## 2021-01-01 DIAGNOSIS — G89.29 CHRONIC RADICULAR CERVICAL PAIN: Primary | ICD-10-CM

## 2021-01-01 DIAGNOSIS — R13.10 ODYNOPHAGIA: ICD-10-CM

## 2021-01-01 DIAGNOSIS — E11.65 UNCONTROLLED TYPE 2 DIABETES MELLITUS WITH HYPERGLYCEMIA (HCC): Primary | ICD-10-CM

## 2021-01-01 LAB
25(OH)D3 SERPL-MCNC: 20.4 NG/ML (ref 30–100)
ABO GROUP BLD: NORMAL
ALBUMIN SERPL ELPH-MCNC: 3.4 G/DL (ref 2.9–4.4)
ALBUMIN SERPL-MCNC: 2.6 G/DL (ref 3.5–5.2)
ALBUMIN SERPL-MCNC: 2.8 G/DL (ref 3.5–5.2)
ALBUMIN SERPL-MCNC: 2.9 G/DL (ref 3.5–5.2)
ALBUMIN SERPL-MCNC: 3.1 G/DL (ref 3.5–5.2)
ALBUMIN SERPL-MCNC: 3.3 G/DL (ref 3.5–5.2)
ALBUMIN SERPL-MCNC: 3.5 G/DL (ref 3.5–5.2)
ALBUMIN SERPL-MCNC: 3.6 G/DL (ref 3.5–5.2)
ALBUMIN SERPL-MCNC: 3.9 G/DL (ref 3.5–5.2)
ALBUMIN/GLOB SERPL: 0.7 G/DL
ALBUMIN/GLOB SERPL: 0.8 G/DL
ALBUMIN/GLOB SERPL: 0.9 G/DL
ALBUMIN/GLOB SERPL: 1 G/DL
ALBUMIN/GLOB SERPL: 1 G/DL
ALBUMIN/GLOB SERPL: 1.1 G/DL
ALBUMIN/GLOB SERPL: 1.2 G/DL
ALBUMIN/GLOB SERPL: 1.2 {RATIO} (ref 0.7–1.7)
ALBUMIN/GLOB SERPL: 1.3 G/DL
ALBUMIN/GLOB SERPL: 1.4 G/DL
ALP SERPL-CCNC: 100 U/L (ref 39–117)
ALP SERPL-CCNC: 109 U/L (ref 39–117)
ALP SERPL-CCNC: 126 U/L (ref 39–117)
ALP SERPL-CCNC: 126 U/L (ref 39–117)
ALP SERPL-CCNC: 148 U/L (ref 39–117)
ALP SERPL-CCNC: 160 U/L (ref 39–117)
ALP SERPL-CCNC: 169 U/L (ref 39–117)
ALP SERPL-CCNC: 95 U/L (ref 39–117)
ALP SERPL-CCNC: 96 U/L (ref 39–117)
ALP SERPL-CCNC: 99 U/L (ref 39–117)
ALP SERPL-CCNC: 99 U/L (ref 39–117)
ALPHA1 GLOB SERPL ELPH-MCNC: 0.3 G/DL (ref 0–0.4)
ALPHA2 GLOB SERPL ELPH-MCNC: 0.7 G/DL (ref 0.4–1)
ALT SERPL W P-5'-P-CCNC: 15 U/L (ref 1–33)
ALT SERPL W P-5'-P-CCNC: 16 U/L (ref 1–33)
ALT SERPL W P-5'-P-CCNC: 21 U/L (ref 1–33)
ALT SERPL W P-5'-P-CCNC: 22 U/L (ref 1–33)
ALT SERPL W P-5'-P-CCNC: 22 U/L (ref 1–33)
ALT SERPL W P-5'-P-CCNC: 23 U/L (ref 1–33)
ALT SERPL W P-5'-P-CCNC: 35 U/L (ref 1–33)
ALT SERPL W P-5'-P-CCNC: 5 U/L (ref 1–33)
ALT SERPL W P-5'-P-CCNC: 52 U/L (ref 1–33)
ALT SERPL W P-5'-P-CCNC: 6 U/L (ref 1–33)
ALT SERPL W P-5'-P-CCNC: 7 U/L (ref 1–33)
ANION GAP SERPL CALCULATED.3IONS-SCNC: 10 MMOL/L (ref 5–15)
ANION GAP SERPL CALCULATED.3IONS-SCNC: 10.2 MMOL/L (ref 5–15)
ANION GAP SERPL CALCULATED.3IONS-SCNC: 13.6 MMOL/L (ref 5–15)
ANION GAP SERPL CALCULATED.3IONS-SCNC: 3.3 MMOL/L (ref 5–15)
ANION GAP SERPL CALCULATED.3IONS-SCNC: 4.7 MMOL/L (ref 5–15)
ANION GAP SERPL CALCULATED.3IONS-SCNC: 5.8 MMOL/L (ref 5–15)
ANION GAP SERPL CALCULATED.3IONS-SCNC: 6.2 MMOL/L (ref 5–15)
ANION GAP SERPL CALCULATED.3IONS-SCNC: 7 MMOL/L (ref 5–15)
ANION GAP SERPL CALCULATED.3IONS-SCNC: 7.1 MMOL/L (ref 5–15)
ANION GAP SERPL CALCULATED.3IONS-SCNC: 7.3 MMOL/L (ref 5–15)
ANION GAP SERPL CALCULATED.3IONS-SCNC: 7.8 MMOL/L (ref 5–15)
ANION GAP SERPL CALCULATED.3IONS-SCNC: 8.2 MMOL/L (ref 5–15)
ANION GAP SERPL CALCULATED.3IONS-SCNC: 9.6 MMOL/L (ref 5–15)
ANISOCYTOSIS BLD QL: NORMAL
AORTIC DIMENSIONLESS INDEX: 0.7 (DI)
APTT PPP: 44 SECONDS (ref 24.3–38.1)
APTT PPP: 64.7 SECONDS (ref 24.3–38.1)
AST SERPL-CCNC: 14 U/L (ref 1–32)
AST SERPL-CCNC: 16 U/L (ref 1–32)
AST SERPL-CCNC: 18 U/L (ref 1–32)
AST SERPL-CCNC: 30 U/L (ref 1–32)
AST SERPL-CCNC: 30 U/L (ref 1–32)
AST SERPL-CCNC: 31 U/L (ref 1–32)
AST SERPL-CCNC: 31 U/L (ref 1–32)
AST SERPL-CCNC: 45 U/L (ref 1–32)
AST SERPL-CCNC: 52 U/L (ref 1–32)
AST SERPL-CCNC: 7 U/L (ref 1–32)
AST SERPL-CCNC: 8 U/L (ref 1–32)
B PARAPERT DNA SPEC QL NAA+PROBE: NOT DETECTED
B PERT DNA SPEC QL NAA+PROBE: NOT DETECTED
B-GLOBULIN SERPL ELPH-MCNC: 1 G/DL (ref 0.7–1.3)
BACTERIA SPEC AEROBE CULT: ABNORMAL
BACTERIA SPEC AEROBE CULT: NORMAL
BACTERIA UR CULT: NORMAL
BACTERIA UR CULT: NORMAL
BACTERIA UR QL AUTO: ABNORMAL /HPF
BACTERIA UR QL AUTO: ABNORMAL /HPF
BASOPHILS # BLD AUTO: 0 10*3/MM3 (ref 0–0.2)
BASOPHILS # BLD AUTO: 0.01 10*3/MM3 (ref 0–0.2)
BASOPHILS # BLD AUTO: 0.01 10*3/MM3 (ref 0–0.2)
BASOPHILS # BLD AUTO: 0.03 10*3/MM3 (ref 0–0.2)
BASOPHILS # BLD AUTO: 0.06 10*3/MM3 (ref 0–0.2)
BASOPHILS # BLD AUTO: 0.07 10*3/MM3 (ref 0–0.2)
BASOPHILS # BLD AUTO: 0.08 10*3/MM3 (ref 0–0.2)
BASOPHILS # BLD AUTO: 0.09 10*3/MM3 (ref 0–0.2)
BASOPHILS # BLD AUTO: 0.1 10*3/MM3 (ref 0–0.2)
BASOPHILS # BLD AUTO: 0.11 10*3/MM3 (ref 0–0.2)
BASOPHILS # BLD AUTO: 0.12 10*3/MM3 (ref 0–0.2)
BASOPHILS # BLD AUTO: 0.13 10*3/MM3 (ref 0–0.2)
BASOPHILS # BLD AUTO: 0.15 10*3/MM3 (ref 0–0.2)
BASOPHILS # BLD AUTO: 0.18 10*3/MM3 (ref 0–0.2)
BASOPHILS NFR BLD AUTO: 0 % (ref 0–1.5)
BASOPHILS NFR BLD AUTO: 0.3 % (ref 0–1.5)
BASOPHILS NFR BLD AUTO: 0.3 % (ref 0–1.5)
BASOPHILS NFR BLD AUTO: 0.7 % (ref 0–1.5)
BASOPHILS NFR BLD AUTO: 1.2 % (ref 0–1.5)
BASOPHILS NFR BLD AUTO: 1.4 % (ref 0–1.5)
BASOPHILS NFR BLD AUTO: 1.5 % (ref 0–1.5)
BASOPHILS NFR BLD AUTO: 1.5 % (ref 0–1.5)
BASOPHILS NFR BLD AUTO: 1.6 % (ref 0–1.5)
BASOPHILS NFR BLD AUTO: 1.8 % (ref 0–1.5)
BASOPHILS NFR BLD AUTO: 1.8 % (ref 0–1.5)
BASOPHILS NFR BLD AUTO: 1.9 % (ref 0–1.5)
BASOPHILS NFR BLD AUTO: 2.1 % (ref 0–1.5)
BASOPHILS NFR BLD AUTO: 2.2 % (ref 0–1.5)
BASOPHILS NFR BLD AUTO: 2.2 % (ref 0–1.5)
BASOPHILS NFR BLD AUTO: 2.3 % (ref 0–1.5)
BASOPHILS NFR BLD AUTO: 2.4 % (ref 0–1.5)
BASOPHILS NFR BLD AUTO: 2.5 % (ref 0–1.5)
BASOPHILS NFR BLD AUTO: 2.5 % (ref 0–1.5)
BASOPHILS NFR BLD AUTO: 2.6 % (ref 0–1.5)
BASOPHILS NFR BLD AUTO: 2.7 % (ref 0–1.5)
BASOPHILS NFR BLD AUTO: 3.4 % (ref 0–1.5)
BASOPHILS NFR BLD AUTO: 3.5 % (ref 0–1.5)
BH BB BLOOD EXPIRATION DATE: NORMAL
BH BB BLOOD TYPE BARCODE: 1700
BH BB BLOOD TYPE BARCODE: 5100
BH BB BLOOD TYPE BARCODE: 7300
BH BB DISPENSE STATUS: NORMAL
BH BB PRODUCT CODE: NORMAL
BH BB UNIT NUMBER: NORMAL
BH CV ECHO MEAS - AO MAX PG (FULL): 4.1 MMHG
BH CV ECHO MEAS - AO MAX PG: 10.2 MMHG
BH CV ECHO MEAS - AO MEAN PG (FULL): 3 MMHG
BH CV ECHO MEAS - AO MEAN PG: 6 MMHG
BH CV ECHO MEAS - AO ROOT AREA (BSA CORRECTED): 1.5
BH CV ECHO MEAS - AO ROOT AREA: 6.2 CM^2
BH CV ECHO MEAS - AO ROOT DIAM: 2.8 CM
BH CV ECHO MEAS - AO V2 MAX: 160 CM/SEC
BH CV ECHO MEAS - AO V2 MEAN: 116 CM/SEC
BH CV ECHO MEAS - AO V2 VTI: 23.6 CM
BH CV ECHO MEAS - ASC AORTA: 3.4 CM
BH CV ECHO MEAS - AVA(I,A): 1.7 CM^2
BH CV ECHO MEAS - AVA(I,D): 1.7 CM^2
BH CV ECHO MEAS - AVA(V,A): 2 CM^2
BH CV ECHO MEAS - AVA(V,D): 2 CM^2
BH CV ECHO MEAS - BSA(HAYCOCK): 2 M^2
BH CV ECHO MEAS - BSA: 1.9 M^2
BH CV ECHO MEAS - BZI_BMI: 38.5 KILOGRAMS/M^2
BH CV ECHO MEAS - BZI_METRIC_HEIGHT: 154.9 CM
BH CV ECHO MEAS - BZI_METRIC_WEIGHT: 92.5 KG
BH CV ECHO MEAS - CONTRAST EF 4CH: 64 CM2
BH CV ECHO MEAS - EDV(CUBED): 81.2 ML
BH CV ECHO MEAS - EDV(MOD-SP2): 50 ML
BH CV ECHO MEAS - EDV(MOD-SP4): 78.4 ML
BH CV ECHO MEAS - EDV(TEICH): 84.4 ML
BH CV ECHO MEAS - EF(CUBED): 85.2 %
BH CV ECHO MEAS - EF(MOD-BP): 63.8 %
BH CV ECHO MEAS - EF(MOD-SP2): 55.8 %
BH CV ECHO MEAS - EF(MOD-SP4): 69.5 %
BH CV ECHO MEAS - EF(TEICH): 78.8 %
BH CV ECHO MEAS - ESV(CUBED): 12 ML
BH CV ECHO MEAS - ESV(MOD-SP2): 22.1 ML
BH CV ECHO MEAS - ESV(MOD-SP4): 23.9 ML
BH CV ECHO MEAS - ESV(TEICH): 17.9 ML
BH CV ECHO MEAS - FS: 47.1 %
BH CV ECHO MEAS - IVS/LVPW: 1.2
BH CV ECHO MEAS - IVSD: 1.4 CM
BH CV ECHO MEAS - LV DIASTOLIC VOL/BSA (35-75): 41.2 ML/M^2
BH CV ECHO MEAS - LV MASS(C)D: 197 GRAMS
BH CV ECHO MEAS - LV MASS(C)DI: 103.4 GRAMS/M^2
BH CV ECHO MEAS - LV MAX PG: 6.2 MMHG
BH CV ECHO MEAS - LV MEAN PG: 3 MMHG
BH CV ECHO MEAS - LV SYSTOLIC VOL/BSA (12-30): 12.5 ML/M^2
BH CV ECHO MEAS - LV V1 MAX: 124 CM/SEC
BH CV ECHO MEAS - LV V1 MEAN: 77.7 CM/SEC
BH CV ECHO MEAS - LV V1 VTI: 15.6 CM
BH CV ECHO MEAS - LVIDD: 4.3 CM
BH CV ECHO MEAS - LVIDS: 2.3 CM
BH CV ECHO MEAS - LVLD AP2: 6.6 CM
BH CV ECHO MEAS - LVLD AP4: 7 CM
BH CV ECHO MEAS - LVLS AP2: 6 CM
BH CV ECHO MEAS - LVLS AP4: 5.8 CM
BH CV ECHO MEAS - LVOT AREA (M): 2.5 CM^2
BH CV ECHO MEAS - LVOT AREA: 2.5 CM^2
BH CV ECHO MEAS - LVOT DIAM: 1.8 CM
BH CV ECHO MEAS - LVPWD: 1.1 CM
BH CV ECHO MEAS - MV A MAX VEL: 59.1 CM/SEC
BH CV ECHO MEAS - MV DEC SLOPE: 851 CM/SEC^2
BH CV ECHO MEAS - MV DEC TIME: 150 SEC
BH CV ECHO MEAS - MV E MAX VEL: 112.5 CM/SEC
BH CV ECHO MEAS - MV E/A: 1.9
BH CV ECHO MEAS - MV MEAN PG: 3 MMHG
BH CV ECHO MEAS - MV P1/2T MAX VEL: 133 CM/SEC
BH CV ECHO MEAS - MV P1/2T: 45.8 MSEC
BH CV ECHO MEAS - MV V2 MEAN: 82.8 CM/SEC
BH CV ECHO MEAS - MV V2 VTI: 15.3 CM
BH CV ECHO MEAS - MVA P1/2T LCG: 1.7 CM^2
BH CV ECHO MEAS - MVA(P1/2T): 4.8 CM^2
BH CV ECHO MEAS - MVA(VTI): 2.6 CM^2
BH CV ECHO MEAS - PA ACC TIME: 0.06 SEC
BH CV ECHO MEAS - PA MAX PG: 4.8 MMHG
BH CV ECHO MEAS - PA PR(ACCEL): 52.9 MMHG
BH CV ECHO MEAS - PA V2 MAX: 109 CM/SEC
BH CV ECHO MEAS - RAP SYSTOLE: 3 MMHG
BH CV ECHO MEAS - RV MEAN PG: 1 MMHG
BH CV ECHO MEAS - RV V1 MEAN: 38.9 CM/SEC
BH CV ECHO MEAS - RV V1 VTI: 8.8 CM
BH CV ECHO MEAS - SI(AO): 76.3 ML/M^2
BH CV ECHO MEAS - SI(CUBED): 36.3 ML/M^2
BH CV ECHO MEAS - SI(LVOT): 20.8 ML/M^2
BH CV ECHO MEAS - SI(MOD-SP2): 14.6 ML/M^2
BH CV ECHO MEAS - SI(MOD-SP4): 28.6 ML/M^2
BH CV ECHO MEAS - SI(TEICH): 34.9 ML/M^2
BH CV ECHO MEAS - SV(AO): 145.3 ML
BH CV ECHO MEAS - SV(CUBED): 69.2 ML
BH CV ECHO MEAS - SV(LVOT): 39.7 ML
BH CV ECHO MEAS - SV(MOD-SP2): 27.9 ML
BH CV ECHO MEAS - SV(MOD-SP4): 54.5 ML
BH CV ECHO MEAS - SV(TEICH): 66.5 ML
BH CV ECHO MEAS - TAPSE (>1.6): 1.6 CM
BH CV REST NUCLEAR ISOTOPE DOSE: 10.2 MCI
BH CV STRESS BP STAGE 1: NORMAL
BH CV STRESS COMMENTS STAGE 1: NORMAL
BH CV STRESS DOSE REGADENOSON STAGE 1: 0.4
BH CV STRESS DURATION MIN STAGE 1: 0
BH CV STRESS DURATION SEC STAGE 1: 30
BH CV STRESS NUCLEAR ISOTOPE DOSE: 30.4 MCI
BH CV STRESS O2 STAGE 1: NORMAL
BH CV STRESS PROTOCOL 1: NORMAL
BH CV STRESS RECOVERY BP: NORMAL MMHG
BH CV STRESS RECOVERY HR: 80 BPM
BH CV STRESS RECOVERY O2: 100 %
BH CV STRESS STAGE 1: 1
BH CV VAS BP RIGHT ARM: NORMAL MMHG
BH CV XLRA - RV BASE: 3.1 CM
BH CV XLRA - RV LENGTH: 6.3 CM
BH CV XLRA - RV MID: 2.3 CM
BH CV XLRA - TDI S': 13.6 CM/SEC
BILIRUB BLD-MCNC: NEGATIVE MG/DL
BILIRUB CONJ SERPL-MCNC: 0.2 MG/DL (ref 0–0.3)
BILIRUB CONJ SERPL-MCNC: 0.2 MG/DL (ref 0–0.3)
BILIRUB INDIRECT SERPL-MCNC: 0.2 MG/DL
BILIRUB INDIRECT SERPL-MCNC: 0.3 MG/DL
BILIRUB SERPL-MCNC: 0.3 MG/DL (ref 0–1.2)
BILIRUB SERPL-MCNC: 0.4 MG/DL (ref 0–1.2)
BILIRUB SERPL-MCNC: 0.4 MG/DL (ref 0–1.2)
BILIRUB SERPL-MCNC: 0.5 MG/DL (ref 0–1.2)
BILIRUB SERPL-MCNC: 0.6 MG/DL (ref 0–1.2)
BILIRUB SERPL-MCNC: 0.7 MG/DL (ref 0–1.2)
BILIRUB UR QL STRIP: NEGATIVE
BILIRUB UR QL STRIP: NEGATIVE
BLD GP AB SCN SERPL QL: NEGATIVE
BUN SERPL-MCNC: 14 MG/DL (ref 8–23)
BUN SERPL-MCNC: 16 MG/DL (ref 8–23)
BUN SERPL-MCNC: 23 MG/DL (ref 8–23)
BUN SERPL-MCNC: 29 MG/DL (ref 8–23)
BUN SERPL-MCNC: 32 MG/DL (ref 8–23)
BUN SERPL-MCNC: 42 MG/DL (ref 8–23)
BUN SERPL-MCNC: 48 MG/DL (ref 8–23)
BUN SERPL-MCNC: 50 MG/DL (ref 8–23)
BUN SERPL-MCNC: 52 MG/DL (ref 8–23)
BUN SERPL-MCNC: 54 MG/DL (ref 8–23)
BUN SERPL-MCNC: 57 MG/DL (ref 8–23)
BUN SERPL-MCNC: 57 MG/DL (ref 8–23)
BUN SERPL-MCNC: 60 MG/DL (ref 8–23)
BUN/CREAT SERPL: 10.7 (ref 7–25)
BUN/CREAT SERPL: 11.7 (ref 7–25)
BUN/CREAT SERPL: 13.4 (ref 7–25)
BUN/CREAT SERPL: 16.4 (ref 7–25)
BUN/CREAT SERPL: 17.6 (ref 7–25)
BUN/CREAT SERPL: 17.9 (ref 7–25)
BUN/CREAT SERPL: 17.9 (ref 7–25)
BUN/CREAT SERPL: 18.4 (ref 7–25)
BUN/CREAT SERPL: 19 (ref 7–25)
BUN/CREAT SERPL: 19.5 (ref 7–25)
BUN/CREAT SERPL: 26.4 (ref 7–25)
BUN/CREAT SERPL: 29 (ref 7–25)
BUN/CREAT SERPL: 9.3 (ref 7–25)
C PNEUM DNA NPH QL NAA+NON-PROBE: NOT DETECTED
CALCIUM SPEC-SCNC: 7.4 MG/DL (ref 8.6–10.5)
CALCIUM SPEC-SCNC: 7.7 MG/DL (ref 8.6–10.5)
CALCIUM SPEC-SCNC: 7.8 MG/DL (ref 8.6–10.5)
CALCIUM SPEC-SCNC: 7.8 MG/DL (ref 8.6–10.5)
CALCIUM SPEC-SCNC: 8.2 MG/DL (ref 8.6–10.5)
CALCIUM SPEC-SCNC: 8.4 MG/DL (ref 8.6–10.5)
CALCIUM SPEC-SCNC: 8.6 MG/DL (ref 8.6–10.5)
CALCIUM SPEC-SCNC: 8.8 MG/DL (ref 8.6–10.5)
CALCIUM SPEC-SCNC: 9 MG/DL (ref 8.6–10.5)
CALCIUM SPEC-SCNC: 9 MG/DL (ref 8.6–10.5)
CALCIUM SPEC-SCNC: 9.1 MG/DL (ref 8.6–10.5)
CALCIUM SPEC-SCNC: 9.1 MG/DL (ref 8.6–10.5)
CALCIUM SPEC-SCNC: 9.2 MG/DL (ref 8.6–10.5)
CHLORIDE SERPL-SCNC: 100 MMOL/L (ref 98–107)
CHLORIDE SERPL-SCNC: 102 MMOL/L (ref 98–107)
CHLORIDE SERPL-SCNC: 102 MMOL/L (ref 98–107)
CHLORIDE SERPL-SCNC: 103 MMOL/L (ref 98–107)
CHLORIDE SERPL-SCNC: 103 MMOL/L (ref 98–107)
CHLORIDE SERPL-SCNC: 105 MMOL/L (ref 98–107)
CHLORIDE SERPL-SCNC: 105 MMOL/L (ref 98–107)
CHLORIDE SERPL-SCNC: 107 MMOL/L (ref 98–107)
CHLORIDE SERPL-SCNC: 108 MMOL/L (ref 98–107)
CHLORIDE SERPL-SCNC: 108 MMOL/L (ref 98–107)
CHLORIDE SERPL-SCNC: 109 MMOL/L (ref 98–107)
CHLORIDE SERPL-SCNC: 110 MMOL/L (ref 98–107)
CHLORIDE SERPL-SCNC: 96 MMOL/L (ref 98–107)
CHLORIDE UR-SCNC: 56 MMOL/L
CLARITY UR: ABNORMAL
CLARITY UR: CLEAR
CLARITY, POC: CLEAR
CO2 SERPL-SCNC: 18.4 MMOL/L (ref 22–29)
CO2 SERPL-SCNC: 22 MMOL/L (ref 22–29)
CO2 SERPL-SCNC: 24 MMOL/L (ref 22–29)
CO2 SERPL-SCNC: 24.4 MMOL/L (ref 22–29)
CO2 SERPL-SCNC: 24.8 MMOL/L (ref 22–29)
CO2 SERPL-SCNC: 26.2 MMOL/L (ref 22–29)
CO2 SERPL-SCNC: 26.2 MMOL/L (ref 22–29)
CO2 SERPL-SCNC: 26.8 MMOL/L (ref 22–29)
CO2 SERPL-SCNC: 26.8 MMOL/L (ref 22–29)
CO2 SERPL-SCNC: 26.9 MMOL/L (ref 22–29)
CO2 SERPL-SCNC: 28.3 MMOL/L (ref 22–29)
CO2 SERPL-SCNC: 29.7 MMOL/L (ref 22–29)
CO2 SERPL-SCNC: 32.7 MMOL/L (ref 22–29)
COLOR UR: ABNORMAL
COLOR UR: ABNORMAL
COLOR UR: YELLOW
CREAT SERPL-MCNC: 1.49 MG/DL (ref 0.57–1)
CREAT SERPL-MCNC: 1.51 MG/DL (ref 0.57–1)
CREAT SERPL-MCNC: 1.65 MG/DL (ref 0.57–1)
CREAT SERPL-MCNC: 1.74 MG/DL (ref 0.57–1)
CREAT SERPL-MCNC: 1.86 MG/DL (ref 0.57–1)
CREAT SERPL-MCNC: 1.96 MG/DL (ref 0.57–1)
CREAT SERPL-MCNC: 2.16 MG/DL (ref 0.57–1)
CREAT SERPL-MCNC: 2.21 MG/DL (ref 0.57–1)
CREAT SERPL-MCNC: 2.68 MG/DL (ref 0.57–1)
CREAT SERPL-MCNC: 2.84 MG/DL (ref 0.57–1)
CREAT SERPL-MCNC: 3.07 MG/DL (ref 0.57–1)
CREAT SERPL-MCNC: 3.18 MG/DL (ref 0.57–1)
CREAT SERPL-MCNC: 3.19 MG/DL (ref 0.57–1)
CREAT SERPL-MCNC: 3.67 MG/DL (ref 0.57–1)
CREAT SERPL-MCNC: 3.72 MG/DL (ref 0.57–1)
CREAT SERPL-MCNC: 3.79 MG/DL (ref 0.57–1)
CROSSMATCH INTERPRETATION: NORMAL
CRP SERPL-MCNC: 11.98 MG/DL (ref 0–0.5)
CRP SERPL-MCNC: 13.14 MG/DL (ref 0–0.5)
CRP SERPL-MCNC: 14.01 MG/DL (ref 0–0.5)
CRP SERPL-MCNC: 2.19 MG/DL (ref 0–0.5)
CRP SERPL-MCNC: 4.55 MG/DL (ref 0–0.5)
D DIMER PPP FEU-MCNC: 0.48 MCGFEU/ML (ref 0–0.46)
D-LACTATE SERPL-SCNC: 0.5 MMOL/L (ref 0.5–2)
D-LACTATE SERPL-SCNC: 1.2 MMOL/L (ref 0.5–2)
DEPRECATED RDW RBC AUTO: 62 FL (ref 37–54)
DEPRECATED RDW RBC AUTO: 66.6 FL (ref 37–54)
DEPRECATED RDW RBC AUTO: 66.9 FL (ref 37–54)
DEPRECATED RDW RBC AUTO: 67.7 FL (ref 37–54)
DEPRECATED RDW RBC AUTO: 67.7 FL (ref 37–54)
DEPRECATED RDW RBC AUTO: 69.6 FL (ref 37–54)
DEPRECATED RDW RBC AUTO: 70.4 FL (ref 37–54)
DEPRECATED RDW RBC AUTO: 70.8 FL (ref 37–54)
DEPRECATED RDW RBC AUTO: 71.5 FL (ref 37–54)
DEPRECATED RDW RBC AUTO: 72.3 FL (ref 37–54)
DEPRECATED RDW RBC AUTO: 72.4 FL (ref 37–54)
DEPRECATED RDW RBC AUTO: 72.7 FL (ref 37–54)
DEPRECATED RDW RBC AUTO: 73 FL (ref 37–54)
DEPRECATED RDW RBC AUTO: 73.1 FL (ref 37–54)
DEPRECATED RDW RBC AUTO: 73.2 FL (ref 37–54)
DEPRECATED RDW RBC AUTO: 73.5 FL (ref 37–54)
DEPRECATED RDW RBC AUTO: 73.6 FL (ref 37–54)
DEPRECATED RDW RBC AUTO: 74.3 FL (ref 37–54)
DEPRECATED RDW RBC AUTO: 76.5 FL (ref 37–54)
DEPRECATED RDW RBC AUTO: 76.6 FL (ref 37–54)
DEPRECATED RDW RBC AUTO: 76.9 FL (ref 37–54)
DEPRECATED RDW RBC AUTO: 77 FL (ref 37–54)
DEPRECATED RDW RBC AUTO: 77.1 FL (ref 37–54)
DEPRECATED RDW RBC AUTO: 77.2 FL (ref 37–54)
DEPRECATED RDW RBC AUTO: 77.5 FL (ref 37–54)
DEPRECATED RDW RBC AUTO: 78.3 FL (ref 37–54)
DEPRECATED RDW RBC AUTO: 79.9 FL (ref 37–54)
DEPRECATED RDW RBC AUTO: 81.3 FL (ref 37–54)
DEPRECATED RDW RBC AUTO: 81.5 FL (ref 37–54)
DEPRECATED RDW RBC AUTO: 82 FL (ref 37–54)
DEPRECATED RDW RBC AUTO: 84.3 FL (ref 37–54)
DEPRECATED RDW RBC AUTO: 90.1 FL (ref 37–54)
DIGOXIN SERPL-MCNC: 1.14 NG/ML (ref 0.6–1.2)
DX ICD CODE: NORMAL
EOSINOPHIL # BLD AUTO: 0 10*3/MM3 (ref 0–0.4)
EOSINOPHIL # BLD AUTO: 0.01 10*3/MM3 (ref 0–0.4)
EOSINOPHIL # BLD AUTO: 0.07 10*3/MM3 (ref 0–0.4)
EOSINOPHIL # BLD AUTO: 0.11 10*3/MM3 (ref 0–0.4)
EOSINOPHIL # BLD AUTO: 0.24 10*3/MM3 (ref 0–0.4)
EOSINOPHIL # BLD AUTO: 0.26 10*3/MM3 (ref 0–0.4)
EOSINOPHIL # BLD AUTO: 0.27 10*3/MM3 (ref 0–0.4)
EOSINOPHIL # BLD AUTO: 0.28 10*3/MM3 (ref 0–0.4)
EOSINOPHIL # BLD AUTO: 0.29 10*3/MM3 (ref 0–0.4)
EOSINOPHIL # BLD AUTO: 0.31 10*3/MM3 (ref 0–0.4)
EOSINOPHIL # BLD AUTO: 0.32 10*3/MM3 (ref 0–0.4)
EOSINOPHIL # BLD AUTO: 0.32 10*3/MM3 (ref 0–0.4)
EOSINOPHIL # BLD AUTO: 0.33 10*3/MM3 (ref 0–0.4)
EOSINOPHIL # BLD AUTO: 0.34 10*3/MM3 (ref 0–0.4)
EOSINOPHIL # BLD AUTO: 0.36 10*3/MM3 (ref 0–0.4)
EOSINOPHIL # BLD AUTO: 0.36 10*3/MM3 (ref 0–0.4)
EOSINOPHIL # BLD AUTO: 0.37 10*3/MM3 (ref 0–0.4)
EOSINOPHIL # BLD AUTO: 0.39 10*3/MM3 (ref 0–0.4)
EOSINOPHIL # BLD AUTO: 0.41 10*3/MM3 (ref 0–0.4)
EOSINOPHIL # BLD AUTO: 0.41 10*3/MM3 (ref 0–0.4)
EOSINOPHIL # BLD AUTO: 0.42 10*3/MM3 (ref 0–0.4)
EOSINOPHIL # BLD AUTO: 0.43 10*3/MM3 (ref 0–0.4)
EOSINOPHIL # BLD AUTO: 0.46 10*3/MM3 (ref 0–0.4)
EOSINOPHIL # BLD AUTO: 0.51 10*3/MM3 (ref 0–0.4)
EOSINOPHIL # BLD AUTO: 0.52 10*3/MM3 (ref 0–0.4)
EOSINOPHIL # BLD AUTO: 0.53 10*3/MM3 (ref 0–0.4)
EOSINOPHIL # BLD AUTO: 0.55 10*3/MM3 (ref 0–0.4)
EOSINOPHIL NFR BLD AUTO: 0 % (ref 0.3–6.2)
EOSINOPHIL NFR BLD AUTO: 0.3 % (ref 0.3–6.2)
EOSINOPHIL NFR BLD AUTO: 1.8 % (ref 0.3–6.2)
EOSINOPHIL NFR BLD AUTO: 13.3 % (ref 0.3–6.2)
EOSINOPHIL NFR BLD AUTO: 19.9 % (ref 0.3–6.2)
EOSINOPHIL NFR BLD AUTO: 2.6 % (ref 0.3–6.2)
EOSINOPHIL NFR BLD AUTO: 4.1 % (ref 0.3–6.2)
EOSINOPHIL NFR BLD AUTO: 4.4 % (ref 0.3–6.2)
EOSINOPHIL NFR BLD AUTO: 4.9 % (ref 0.3–6.2)
EOSINOPHIL NFR BLD AUTO: 5 % (ref 0.3–6.2)
EOSINOPHIL NFR BLD AUTO: 5.1 % (ref 0.3–6.2)
EOSINOPHIL NFR BLD AUTO: 5.4 % (ref 0.3–6.2)
EOSINOPHIL NFR BLD AUTO: 5.4 % (ref 0.3–6.2)
EOSINOPHIL NFR BLD AUTO: 5.5 % (ref 0.3–6.2)
EOSINOPHIL NFR BLD AUTO: 5.8 % (ref 0.3–6.2)
EOSINOPHIL NFR BLD AUTO: 6.3 % (ref 0.3–6.2)
EOSINOPHIL NFR BLD AUTO: 6.3 % (ref 0.3–6.2)
EOSINOPHIL NFR BLD AUTO: 6.5 % (ref 0.3–6.2)
EOSINOPHIL NFR BLD AUTO: 6.5 % (ref 0.3–6.2)
EOSINOPHIL NFR BLD AUTO: 6.7 % (ref 0.3–6.2)
EOSINOPHIL NFR BLD AUTO: 6.7 % (ref 0.3–6.2)
EOSINOPHIL NFR BLD AUTO: 6.9 % (ref 0.3–6.2)
EOSINOPHIL NFR BLD AUTO: 7 % (ref 0.3–6.2)
EOSINOPHIL NFR BLD AUTO: 7.2 % (ref 0.3–6.2)
EOSINOPHIL NFR BLD AUTO: 7.2 % (ref 0.3–6.2)
EOSINOPHIL NFR BLD AUTO: 7.4 % (ref 0.3–6.2)
EOSINOPHIL NFR BLD AUTO: 7.5 % (ref 0.3–6.2)
EOSINOPHIL NFR BLD AUTO: 7.8 % (ref 0.3–6.2)
EOSINOPHIL NFR BLD AUTO: 7.9 % (ref 0.3–6.2)
EOSINOPHIL NFR BLD AUTO: 8.7 % (ref 0.3–6.2)
EOSINOPHIL NFR BLD AUTO: 9.3 % (ref 0.3–6.2)
EOSINOPHIL NFR BLD AUTO: 9.6 % (ref 0.3–6.2)
ERYTHROCYTE [DISTWIDTH] IN BLOOD BY AUTOMATED COUNT: 19.4 % (ref 12.3–15.4)
ERYTHROCYTE [DISTWIDTH] IN BLOOD BY AUTOMATED COUNT: 20.1 % (ref 12.3–15.4)
ERYTHROCYTE [DISTWIDTH] IN BLOOD BY AUTOMATED COUNT: 20.8 % (ref 12.3–15.4)
ERYTHROCYTE [DISTWIDTH] IN BLOOD BY AUTOMATED COUNT: 20.9 % (ref 12.3–15.4)
ERYTHROCYTE [DISTWIDTH] IN BLOOD BY AUTOMATED COUNT: 20.9 % (ref 12.3–15.4)
ERYTHROCYTE [DISTWIDTH] IN BLOOD BY AUTOMATED COUNT: 21.1 % (ref 12.3–15.4)
ERYTHROCYTE [DISTWIDTH] IN BLOOD BY AUTOMATED COUNT: 21.3 % (ref 12.3–15.4)
ERYTHROCYTE [DISTWIDTH] IN BLOOD BY AUTOMATED COUNT: 21.3 % (ref 12.3–15.4)
ERYTHROCYTE [DISTWIDTH] IN BLOOD BY AUTOMATED COUNT: 21.6 % (ref 12.3–15.4)
ERYTHROCYTE [DISTWIDTH] IN BLOOD BY AUTOMATED COUNT: 21.7 % (ref 12.3–15.4)
ERYTHROCYTE [DISTWIDTH] IN BLOOD BY AUTOMATED COUNT: 22 % (ref 12.3–15.4)
ERYTHROCYTE [DISTWIDTH] IN BLOOD BY AUTOMATED COUNT: 22 % (ref 12.3–15.4)
ERYTHROCYTE [DISTWIDTH] IN BLOOD BY AUTOMATED COUNT: 22.1 % (ref 12.3–15.4)
ERYTHROCYTE [DISTWIDTH] IN BLOOD BY AUTOMATED COUNT: 22.2 % (ref 12.3–15.4)
ERYTHROCYTE [DISTWIDTH] IN BLOOD BY AUTOMATED COUNT: 22.3 % (ref 12.3–15.4)
ERYTHROCYTE [DISTWIDTH] IN BLOOD BY AUTOMATED COUNT: 22.4 % (ref 12.3–15.4)
ERYTHROCYTE [DISTWIDTH] IN BLOOD BY AUTOMATED COUNT: 22.5 % (ref 12.3–15.4)
ERYTHROCYTE [DISTWIDTH] IN BLOOD BY AUTOMATED COUNT: 22.7 % (ref 12.3–15.4)
ERYTHROCYTE [DISTWIDTH] IN BLOOD BY AUTOMATED COUNT: 22.9 % (ref 12.3–15.4)
ERYTHROCYTE [DISTWIDTH] IN BLOOD BY AUTOMATED COUNT: 23.3 % (ref 12.3–15.4)
ERYTHROCYTE [DISTWIDTH] IN BLOOD BY AUTOMATED COUNT: 23.3 % (ref 12.3–15.4)
ERYTHROCYTE [DISTWIDTH] IN BLOOD BY AUTOMATED COUNT: 23.9 % (ref 12.3–15.4)
ERYTHROCYTE [DISTWIDTH] IN BLOOD BY AUTOMATED COUNT: 24.6 % (ref 12.3–15.4)
ERYTHROCYTE [DISTWIDTH] IN BLOOD BY AUTOMATED COUNT: 25.7 % (ref 12.3–15.4)
ERYTHROCYTE [SEDIMENTATION RATE] IN BLOOD: 32 MM/HR (ref 0–30)
ERYTHROCYTE [SEDIMENTATION RATE] IN BLOOD: 40 MM/HR (ref 0–30)
ERYTHROCYTE [SEDIMENTATION RATE] IN BLOOD: 63 MM/HR (ref 0–30)
ERYTHROCYTE [SEDIMENTATION RATE] IN BLOOD: 92 MM/HR (ref 0–30)
FERRITIN SERPL-MCNC: 1775 NG/ML (ref 13–150)
FERRITIN SERPL-MCNC: 1786 NG/ML (ref 13–150)
FERRITIN SERPL-MCNC: 7397 NG/ML (ref 13–150)
FERRITIN SERPL-MCNC: 7918 NG/ML (ref 13–150)
FERRITIN SERPL-MCNC: >8000 NG/ML (ref 13–150)
FERRITIN SERPL-MCNC: >8000 NG/ML (ref 13–150)
FLUAV SUBTYP SPEC NAA+PROBE: NOT DETECTED
FLUBV RNA ISLT QL NAA+PROBE: NOT DETECTED
GAMMA GLOB SERPL ELPH-MCNC: 0.8 G/DL (ref 0.4–1.8)
GFR SERPL CREATININE-BSD FRML MDRD: 11 ML/MIN/1.73
GFR SERPL CREATININE-BSD FRML MDRD: 12 ML/MIN/1.73
GFR SERPL CREATININE-BSD FRML MDRD: 12 ML/MIN/1.73
GFR SERPL CREATININE-BSD FRML MDRD: 14 ML/MIN/1.73
GFR SERPL CREATININE-BSD FRML MDRD: 14 ML/MIN/1.73
GFR SERPL CREATININE-BSD FRML MDRD: 15 ML/MIN/1.73
GFR SERPL CREATININE-BSD FRML MDRD: 16 ML/MIN/1.73
GFR SERPL CREATININE-BSD FRML MDRD: 17 ML/MIN/1.73
GFR SERPL CREATININE-BSD FRML MDRD: 21 ML/MIN/1.73
GFR SERPL CREATININE-BSD FRML MDRD: 22 ML/MIN/1.73
GFR SERPL CREATININE-BSD FRML MDRD: 25 ML/MIN/1.73
GFR SERPL CREATININE-BSD FRML MDRD: 26 ML/MIN/1.73
GFR SERPL CREATININE-BSD FRML MDRD: 28 ML/MIN/1.73
GFR SERPL CREATININE-BSD FRML MDRD: 30 ML/MIN/1.73
GFR SERPL CREATININE-BSD FRML MDRD: 33 ML/MIN/1.73
GFR SERPL CREATININE-BSD FRML MDRD: 34 ML/MIN/1.73
GFR SERPL CREATININE-BSD FRML MDRD: ABNORMAL ML/MIN/{1.73_M2}
GLOBULIN SER CALC-MCNC: 2.9 G/DL (ref 2.2–3.9)
GLOBULIN UR ELPH-MCNC: 2.5 GM/DL
GLOBULIN UR ELPH-MCNC: 2.7 GM/DL
GLOBULIN UR ELPH-MCNC: 2.7 GM/DL
GLOBULIN UR ELPH-MCNC: 2.8 GM/DL
GLOBULIN UR ELPH-MCNC: 2.8 GM/DL
GLOBULIN UR ELPH-MCNC: 3 GM/DL
GLOBULIN UR ELPH-MCNC: 3.3 GM/DL
GLOBULIN UR ELPH-MCNC: 3.4 GM/DL
GLOBULIN UR ELPH-MCNC: 3.5 GM/DL
GLUCOSE BLDC GLUCOMTR-MCNC: 100 MG/DL (ref 70–130)
GLUCOSE BLDC GLUCOMTR-MCNC: 100 MG/DL (ref 70–130)
GLUCOSE BLDC GLUCOMTR-MCNC: 105 MG/DL (ref 70–130)
GLUCOSE BLDC GLUCOMTR-MCNC: 109 MG/DL (ref 70–130)
GLUCOSE BLDC GLUCOMTR-MCNC: 132 MG/DL (ref 70–130)
GLUCOSE BLDC GLUCOMTR-MCNC: 137 MG/DL (ref 70–130)
GLUCOSE BLDC GLUCOMTR-MCNC: 140 MG/DL (ref 70–130)
GLUCOSE BLDC GLUCOMTR-MCNC: 140 MG/DL (ref 70–130)
GLUCOSE BLDC GLUCOMTR-MCNC: 144 MG/DL (ref 70–130)
GLUCOSE BLDC GLUCOMTR-MCNC: 156 MG/DL (ref 70–130)
GLUCOSE BLDC GLUCOMTR-MCNC: 160 MG/DL (ref 70–130)
GLUCOSE BLDC GLUCOMTR-MCNC: 161 MG/DL (ref 70–130)
GLUCOSE BLDC GLUCOMTR-MCNC: 162 MG/DL (ref 70–130)
GLUCOSE BLDC GLUCOMTR-MCNC: 162 MG/DL (ref 70–130)
GLUCOSE BLDC GLUCOMTR-MCNC: 164 MG/DL (ref 70–130)
GLUCOSE BLDC GLUCOMTR-MCNC: 166 MG/DL (ref 70–130)
GLUCOSE BLDC GLUCOMTR-MCNC: 177 MG/DL (ref 70–130)
GLUCOSE BLDC GLUCOMTR-MCNC: 178 MG/DL (ref 70–130)
GLUCOSE BLDC GLUCOMTR-MCNC: 201 MG/DL (ref 70–130)
GLUCOSE BLDC GLUCOMTR-MCNC: 205 MG/DL (ref 70–130)
GLUCOSE BLDC GLUCOMTR-MCNC: 208 MG/DL (ref 70–130)
GLUCOSE BLDC GLUCOMTR-MCNC: 211 MG/DL (ref 70–130)
GLUCOSE BLDC GLUCOMTR-MCNC: 215 MG/DL (ref 70–130)
GLUCOSE BLDC GLUCOMTR-MCNC: 222 MG/DL (ref 70–130)
GLUCOSE BLDC GLUCOMTR-MCNC: 224 MG/DL (ref 70–130)
GLUCOSE BLDC GLUCOMTR-MCNC: 225 MG/DL (ref 70–130)
GLUCOSE BLDC GLUCOMTR-MCNC: 225 MG/DL (ref 70–130)
GLUCOSE BLDC GLUCOMTR-MCNC: 227 MG/DL (ref 70–130)
GLUCOSE BLDC GLUCOMTR-MCNC: 253 MG/DL (ref 70–130)
GLUCOSE BLDC GLUCOMTR-MCNC: 257 MG/DL (ref 70–130)
GLUCOSE BLDC GLUCOMTR-MCNC: 258 MG/DL (ref 70–130)
GLUCOSE BLDC GLUCOMTR-MCNC: 267 MG/DL (ref 70–130)
GLUCOSE BLDC GLUCOMTR-MCNC: 269 MG/DL (ref 70–130)
GLUCOSE BLDC GLUCOMTR-MCNC: 276 MG/DL (ref 70–130)
GLUCOSE BLDC GLUCOMTR-MCNC: 279 MG/DL (ref 70–130)
GLUCOSE BLDC GLUCOMTR-MCNC: 320 MG/DL (ref 70–130)
GLUCOSE BLDC GLUCOMTR-MCNC: 326 MG/DL (ref 70–130)
GLUCOSE BLDC GLUCOMTR-MCNC: 53 MG/DL (ref 70–130)
GLUCOSE BLDC GLUCOMTR-MCNC: 79 MG/DL (ref 70–130)
GLUCOSE BLDC GLUCOMTR-MCNC: 80 MG/DL (ref 70–130)
GLUCOSE SERPL-MCNC: 114 MG/DL (ref 65–99)
GLUCOSE SERPL-MCNC: 120 MG/DL (ref 65–99)
GLUCOSE SERPL-MCNC: 146 MG/DL (ref 65–99)
GLUCOSE SERPL-MCNC: 161 MG/DL (ref 65–99)
GLUCOSE SERPL-MCNC: 166 MG/DL (ref 65–99)
GLUCOSE SERPL-MCNC: 173 MG/DL (ref 65–99)
GLUCOSE SERPL-MCNC: 202 MG/DL (ref 65–99)
GLUCOSE SERPL-MCNC: 213 MG/DL (ref 65–99)
GLUCOSE SERPL-MCNC: 214 MG/DL (ref 65–99)
GLUCOSE SERPL-MCNC: 230 MG/DL (ref 65–99)
GLUCOSE SERPL-MCNC: 289 MG/DL (ref 65–99)
GLUCOSE SERPL-MCNC: 96 MG/DL (ref 65–99)
GLUCOSE SERPL-MCNC: 97 MG/DL (ref 65–99)
GLUCOSE UR STRIP-MCNC: NEGATIVE MG/DL
H PYLORI IGA SER-ACNC: <9 UNITS (ref 0–8.9)
H PYLORI IGG SER IA-ACNC: 0.11 INDEX VALUE (ref 0–0.79)
H PYLORI IGM SER-ACNC: <9 UNITS (ref 0–8.9)
HADV DNA SPEC NAA+PROBE: NOT DETECTED
HBA1C MFR BLD: 6.1 % (ref 4.8–5.6)
HCOV 229E RNA SPEC QL NAA+PROBE: NOT DETECTED
HCOV HKU1 RNA SPEC QL NAA+PROBE: NOT DETECTED
HCOV NL63 RNA SPEC QL NAA+PROBE: NOT DETECTED
HCOV OC43 RNA SPEC QL NAA+PROBE: NOT DETECTED
HCT VFR BLD AUTO: 24.3 % (ref 34–46.6)
HCT VFR BLD AUTO: 24.4 % (ref 34–46.6)
HCT VFR BLD AUTO: 24.6 % (ref 34–46.6)
HCT VFR BLD AUTO: 25.1 % (ref 34–46.6)
HCT VFR BLD AUTO: 25.7 % (ref 34–46.6)
HCT VFR BLD AUTO: 26.1 % (ref 34–46.6)
HCT VFR BLD AUTO: 26.5 % (ref 34–46.6)
HCT VFR BLD AUTO: 26.6 % (ref 34–46.6)
HCT VFR BLD AUTO: 27.4 % (ref 34–46.6)
HCT VFR BLD AUTO: 27.4 % (ref 34–46.6)
HCT VFR BLD AUTO: 28.1 % (ref 34–46.6)
HCT VFR BLD AUTO: 28.6 % (ref 34–46.6)
HCT VFR BLD AUTO: 29.2 % (ref 34–46.6)
HCT VFR BLD AUTO: 29.8 % (ref 34–46.6)
HCT VFR BLD AUTO: 30.7 % (ref 34–46.6)
HCT VFR BLD AUTO: 30.7 % (ref 34–46.6)
HCT VFR BLD AUTO: 30.8 % (ref 34–46.6)
HCT VFR BLD AUTO: 31.5 % (ref 34–46.6)
HCT VFR BLD AUTO: 31.6 % (ref 34–46.6)
HCT VFR BLD AUTO: 31.6 % (ref 34–46.6)
HCT VFR BLD AUTO: 31.9 % (ref 34–46.6)
HCT VFR BLD AUTO: 32.3 % (ref 34–46.6)
HCT VFR BLD AUTO: 32.3 % (ref 34–46.6)
HCT VFR BLD AUTO: 33.3 % (ref 34–46.6)
HCT VFR BLD AUTO: 33.6 % (ref 34–46.6)
HCT VFR BLD AUTO: 33.7 % (ref 34–46.6)
HCT VFR BLD AUTO: 34.1 % (ref 34–46.6)
HCT VFR BLD AUTO: 34.9 % (ref 34–46.6)
HCT VFR BLD AUTO: 35.2 % (ref 34–46.6)
HCT VFR BLD AUTO: 36.2 % (ref 34–46.6)
HCT VFR BLD AUTO: 36.8 % (ref 34–46.6)
HCT VFR BLD AUTO: 37.7 % (ref 34–46.6)
HGB BLD-MCNC: 10.1 G/DL (ref 12–15.9)
HGB BLD-MCNC: 10.1 G/DL (ref 12–15.9)
HGB BLD-MCNC: 10.5 G/DL (ref 12–15.9)
HGB BLD-MCNC: 10.6 G/DL (ref 12–15.9)
HGB BLD-MCNC: 10.7 G/DL (ref 12–15.9)
HGB BLD-MCNC: 10.8 G/DL (ref 12–15.9)
HGB BLD-MCNC: 10.9 G/DL (ref 12–15.9)
HGB BLD-MCNC: 11.2 G/DL (ref 12–15.9)
HGB BLD-MCNC: 11.3 G/DL (ref 12–15.9)
HGB BLD-MCNC: 11.3 G/DL (ref 12–15.9)
HGB BLD-MCNC: 11.7 G/DL (ref 12–15.9)
HGB BLD-MCNC: 7.4 G/DL (ref 12–15.9)
HGB BLD-MCNC: 7.5 G/DL (ref 12–15.9)
HGB BLD-MCNC: 7.8 G/DL (ref 12–15.9)
HGB BLD-MCNC: 7.8 G/DL (ref 12–15.9)
HGB BLD-MCNC: 7.9 G/DL (ref 12–15.9)
HGB BLD-MCNC: 8.1 G/DL (ref 12–15.9)
HGB BLD-MCNC: 8.2 G/DL (ref 12–15.9)
HGB BLD-MCNC: 8.5 G/DL (ref 12–15.9)
HGB BLD-MCNC: 8.5 G/DL (ref 12–15.9)
HGB BLD-MCNC: 8.6 G/DL (ref 12–15.9)
HGB BLD-MCNC: 8.7 G/DL (ref 12–15.9)
HGB BLD-MCNC: 8.8 G/DL (ref 12–15.9)
HGB BLD-MCNC: 8.9 G/DL (ref 12–15.9)
HGB BLD-MCNC: 9.3 G/DL (ref 12–15.9)
HGB BLD-MCNC: 9.4 G/DL (ref 12–15.9)
HGB BLD-MCNC: 9.5 G/DL (ref 12–15.9)
HGB BLD-MCNC: 9.5 G/DL (ref 12–15.9)
HGB BLD-MCNC: 9.6 G/DL (ref 12–15.9)
HGB BLD-MCNC: 9.8 G/DL (ref 12–15.9)
HGB BLD-MCNC: 9.8 G/DL (ref 12–15.9)
HGB BLD-MCNC: 9.9 G/DL (ref 12–15.9)
HGB UR QL STRIP.AUTO: ABNORMAL
HGB UR QL STRIP.AUTO: NEGATIVE
HMPV RNA NPH QL NAA+NON-PROBE: NOT DETECTED
HOLD SPECIMEN: NORMAL
HOLD SPECIMEN: NORMAL
HPIV1 RNA SPEC QL NAA+PROBE: NOT DETECTED
HPIV2 RNA SPEC QL NAA+PROBE: NOT DETECTED
HPIV3 RNA NPH QL NAA+PROBE: NOT DETECTED
HPIV4 P GENE NPH QL NAA+PROBE: NOT DETECTED
HYALINE CASTS UR QL AUTO: ABNORMAL /LPF
HYALINE CASTS UR QL AUTO: ABNORMAL /LPF
IGA SERPL-MCNC: 434 MG/DL (ref 64–422)
IGG SERPL-MCNC: 782 MG/DL (ref 586–1602)
IGM SERPL-MCNC: 169 MG/DL (ref 26–217)
IMM GRANULOCYTES # BLD AUTO: 0.01 10*3/MM3 (ref 0–0.05)
IMM GRANULOCYTES # BLD AUTO: 0.01 10*3/MM3 (ref 0–0.05)
IMM GRANULOCYTES # BLD AUTO: 0.02 10*3/MM3 (ref 0–0.05)
IMM GRANULOCYTES # BLD AUTO: 0.04 10*3/MM3 (ref 0–0.05)
IMM GRANULOCYTES # BLD AUTO: 0.05 10*3/MM3 (ref 0–0.05)
IMM GRANULOCYTES # BLD AUTO: 0.06 10*3/MM3 (ref 0–0.05)
IMM GRANULOCYTES # BLD AUTO: 0.07 10*3/MM3 (ref 0–0.05)
IMM GRANULOCYTES # BLD AUTO: 0.08 10*3/MM3 (ref 0–0.05)
IMM GRANULOCYTES # BLD AUTO: 0.08 10*3/MM3 (ref 0–0.05)
IMM GRANULOCYTES # BLD AUTO: 0.09 10*3/MM3 (ref 0–0.05)
IMM GRANULOCYTES # BLD AUTO: 0.14 10*3/MM3 (ref 0–0.05)
IMM GRANULOCYTES # BLD AUTO: 0.15 10*3/MM3 (ref 0–0.05)
IMM GRANULOCYTES # BLD AUTO: 0.15 10*3/MM3 (ref 0–0.05)
IMM GRANULOCYTES # BLD AUTO: 0.16 10*3/MM3 (ref 0–0.05)
IMM GRANULOCYTES # BLD AUTO: 0.16 10*3/MM3 (ref 0–0.05)
IMM GRANULOCYTES # BLD AUTO: 0.18 10*3/MM3 (ref 0–0.05)
IMM GRANULOCYTES # BLD AUTO: 0.2 10*3/MM3 (ref 0–0.05)
IMM GRANULOCYTES # BLD AUTO: 0.29 10*3/MM3 (ref 0–0.05)
IMM GRANULOCYTES NFR BLD AUTO: 0.3 % (ref 0–0.5)
IMM GRANULOCYTES NFR BLD AUTO: 0.4 % (ref 0–0.5)
IMM GRANULOCYTES NFR BLD AUTO: 0.6 % (ref 0–0.5)
IMM GRANULOCYTES NFR BLD AUTO: 0.7 % (ref 0–0.5)
IMM GRANULOCYTES NFR BLD AUTO: 0.7 % (ref 0–0.5)
IMM GRANULOCYTES NFR BLD AUTO: 0.8 % (ref 0–0.5)
IMM GRANULOCYTES NFR BLD AUTO: 1 % (ref 0–0.5)
IMM GRANULOCYTES NFR BLD AUTO: 1 % (ref 0–0.5)
IMM GRANULOCYTES NFR BLD AUTO: 1.1 % (ref 0–0.5)
IMM GRANULOCYTES NFR BLD AUTO: 1.1 % (ref 0–0.5)
IMM GRANULOCYTES NFR BLD AUTO: 1.2 % (ref 0–0.5)
IMM GRANULOCYTES NFR BLD AUTO: 1.3 % (ref 0–0.5)
IMM GRANULOCYTES NFR BLD AUTO: 1.3 % (ref 0–0.5)
IMM GRANULOCYTES NFR BLD AUTO: 1.5 % (ref 0–0.5)
IMM GRANULOCYTES NFR BLD AUTO: 1.5 % (ref 0–0.5)
IMM GRANULOCYTES NFR BLD AUTO: 1.7 % (ref 0–0.5)
IMM GRANULOCYTES NFR BLD AUTO: 1.7 % (ref 0–0.5)
IMM GRANULOCYTES NFR BLD AUTO: 1.8 % (ref 0–0.5)
IMM GRANULOCYTES NFR BLD AUTO: 1.8 % (ref 0–0.5)
IMM GRANULOCYTES NFR BLD AUTO: 1.9 % (ref 0–0.5)
IMM GRANULOCYTES NFR BLD AUTO: 2.1 % (ref 0–0.5)
IMM GRANULOCYTES NFR BLD AUTO: 2.1 % (ref 0–0.5)
IMM GRANULOCYTES NFR BLD AUTO: 2.2 % (ref 0–0.5)
IMM GRANULOCYTES NFR BLD AUTO: 3.2 % (ref 0–0.5)
IMM GRANULOCYTES NFR BLD AUTO: 5.3 % (ref 0–0.5)
IMM GRANULOCYTES NFR BLD AUTO: 5.6 % (ref 0–0.5)
IMM GRANULOCYTES NFR BLD AUTO: 5.7 % (ref 0–0.5)
INR PPP: 1.58 (ref 0.9–1.1)
INR PPP: 1.82 (ref 0.9–1.1)
INR PPP: 2.34 (ref 0.9–1.1)
IRON 24H UR-MRATE: 139 MCG/DL (ref 37–145)
IRON 24H UR-MRATE: 76 MCG/DL (ref 37–145)
IRON SATN MFR SERPL: 63 % (ref 20–50)
IRON SATN MFR SERPL: ABNORMAL %
KAPPA LC FREE SER-MCNC: 110.2 MG/L (ref 3.3–19.4)
KAPPA LC FREE/LAMBDA FREE SER: 2.47 {RATIO} (ref 0.26–1.65)
KETONES UR QL STRIP: NEGATIVE
KETONES UR QL STRIP: NEGATIVE
KETONES UR QL: NEGATIVE
L PNEUMO1 AG UR QL IA: NEGATIVE
LAB AP CASE REPORT: NORMAL
LABORATORY COMMENT REPORT: NORMAL
LAMBDA LC FREE SERPL-MCNC: 44.7 MG/L (ref 5.7–26.3)
LARGE PLATELETS: NORMAL
LDH SERPL-CCNC: 195 U/L (ref 135–214)
LEFT ATRIUM VOLUME INDEX: 22 ML/M2
LEUKOCYTE EST, POC: ABNORMAL
LEUKOCYTE ESTERASE UR QL STRIP.AUTO: ABNORMAL
LEUKOCYTE ESTERASE UR QL STRIP.AUTO: NEGATIVE
LIPASE SERPL-CCNC: 40 U/L (ref 13–60)
LV EF NUC BP: 78 %
LYMPHOCYTES # BLD AUTO: 0.45 10*3/MM3 (ref 0.7–3.1)
LYMPHOCYTES # BLD AUTO: 0.63 10*3/MM3 (ref 0.7–3.1)
LYMPHOCYTES # BLD AUTO: 0.63 10*3/MM3 (ref 0.7–3.1)
LYMPHOCYTES # BLD AUTO: 0.68 10*3/MM3 (ref 0.7–3.1)
LYMPHOCYTES # BLD AUTO: 0.77 10*3/MM3 (ref 0.7–3.1)
LYMPHOCYTES # BLD AUTO: 0.79 10*3/MM3 (ref 0.7–3.1)
LYMPHOCYTES # BLD AUTO: 0.98 10*3/MM3 (ref 0.7–3.1)
LYMPHOCYTES # BLD AUTO: 1.01 10*3/MM3 (ref 0.7–3.1)
LYMPHOCYTES # BLD AUTO: 1.21 10*3/MM3 (ref 0.7–3.1)
LYMPHOCYTES # BLD AUTO: 1.23 10*3/MM3 (ref 0.7–3.1)
LYMPHOCYTES # BLD AUTO: 1.33 10*3/MM3 (ref 0.7–3.1)
LYMPHOCYTES # BLD AUTO: 1.35 10*3/MM3 (ref 0.7–3.1)
LYMPHOCYTES # BLD AUTO: 1.47 10*3/MM3 (ref 0.7–3.1)
LYMPHOCYTES # BLD AUTO: 1.5 10*3/MM3 (ref 0.7–3.1)
LYMPHOCYTES # BLD AUTO: 1.56 10*3/MM3 (ref 0.7–3.1)
LYMPHOCYTES # BLD AUTO: 1.59 10*3/MM3 (ref 0.7–3.1)
LYMPHOCYTES # BLD AUTO: 1.62 10*3/MM3 (ref 0.7–3.1)
LYMPHOCYTES # BLD AUTO: 1.63 10*3/MM3 (ref 0.7–3.1)
LYMPHOCYTES # BLD AUTO: 1.64 10*3/MM3 (ref 0.7–3.1)
LYMPHOCYTES # BLD AUTO: 1.65 10*3/MM3 (ref 0.7–3.1)
LYMPHOCYTES # BLD AUTO: 1.67 10*3/MM3 (ref 0.7–3.1)
LYMPHOCYTES # BLD AUTO: 1.67 10*3/MM3 (ref 0.7–3.1)
LYMPHOCYTES # BLD AUTO: 1.7 10*3/MM3 (ref 0.7–3.1)
LYMPHOCYTES # BLD AUTO: 1.72 10*3/MM3 (ref 0.7–3.1)
LYMPHOCYTES # BLD AUTO: 1.72 10*3/MM3 (ref 0.7–3.1)
LYMPHOCYTES # BLD AUTO: 1.78 10*3/MM3 (ref 0.7–3.1)
LYMPHOCYTES # BLD AUTO: 1.87 10*3/MM3 (ref 0.7–3.1)
LYMPHOCYTES # BLD AUTO: 1.9 10*3/MM3 (ref 0.7–3.1)
LYMPHOCYTES # BLD AUTO: 2.08 10*3/MM3 (ref 0.7–3.1)
LYMPHOCYTES # BLD AUTO: 2.26 10*3/MM3 (ref 0.7–3.1)
LYMPHOCYTES # BLD AUTO: 2.48 10*3/MM3 (ref 0.7–3.1)
LYMPHOCYTES # BLD AUTO: 2.57 10*3/MM3 (ref 0.7–3.1)
LYMPHOCYTES NFR BLD AUTO: 13.8 % (ref 19.6–45.3)
LYMPHOCYTES NFR BLD AUTO: 15 % (ref 19.6–45.3)
LYMPHOCYTES NFR BLD AUTO: 15.7 % (ref 19.6–45.3)
LYMPHOCYTES NFR BLD AUTO: 20.6 % (ref 19.6–45.3)
LYMPHOCYTES NFR BLD AUTO: 23 % (ref 19.6–45.3)
LYMPHOCYTES NFR BLD AUTO: 23.3 % (ref 19.6–45.3)
LYMPHOCYTES NFR BLD AUTO: 24.2 % (ref 19.6–45.3)
LYMPHOCYTES NFR BLD AUTO: 24.4 % (ref 19.6–45.3)
LYMPHOCYTES NFR BLD AUTO: 25.6 % (ref 19.6–45.3)
LYMPHOCYTES NFR BLD AUTO: 25.8 % (ref 19.6–45.3)
LYMPHOCYTES NFR BLD AUTO: 26.6 % (ref 19.6–45.3)
LYMPHOCYTES NFR BLD AUTO: 27 % (ref 19.6–45.3)
LYMPHOCYTES NFR BLD AUTO: 27.8 % (ref 19.6–45.3)
LYMPHOCYTES NFR BLD AUTO: 27.8 % (ref 19.6–45.3)
LYMPHOCYTES NFR BLD AUTO: 29.7 % (ref 19.6–45.3)
LYMPHOCYTES NFR BLD AUTO: 30.7 % (ref 19.6–45.3)
LYMPHOCYTES NFR BLD AUTO: 31 % (ref 19.6–45.3)
LYMPHOCYTES NFR BLD AUTO: 31.3 % (ref 19.6–45.3)
LYMPHOCYTES NFR BLD AUTO: 31.5 % (ref 19.6–45.3)
LYMPHOCYTES NFR BLD AUTO: 32.5 % (ref 19.6–45.3)
LYMPHOCYTES NFR BLD AUTO: 32.9 % (ref 19.6–45.3)
LYMPHOCYTES NFR BLD AUTO: 33.5 % (ref 19.6–45.3)
LYMPHOCYTES NFR BLD AUTO: 33.9 % (ref 19.6–45.3)
LYMPHOCYTES NFR BLD AUTO: 34 % (ref 19.6–45.3)
LYMPHOCYTES NFR BLD AUTO: 34.1 % (ref 19.6–45.3)
LYMPHOCYTES NFR BLD AUTO: 34.7 % (ref 19.6–45.3)
LYMPHOCYTES NFR BLD AUTO: 34.8 % (ref 19.6–45.3)
LYMPHOCYTES NFR BLD AUTO: 37 % (ref 19.6–45.3)
LYMPHOCYTES NFR BLD AUTO: 38.4 % (ref 19.6–45.3)
LYMPHOCYTES NFR BLD AUTO: 41.1 % (ref 19.6–45.3)
LYMPHOCYTES NFR BLD AUTO: 46.1 % (ref 19.6–45.3)
LYMPHOCYTES NFR BLD AUTO: 7.6 % (ref 19.6–45.3)
M PNEUMO IGG SER IA-ACNC: NOT DETECTED
M PROTEIN SERPL ELPH-MCNC: NORMAL G/DL
MAGNESIUM SERPL-MCNC: 2.3 MG/DL (ref 1.6–2.4)
MAXIMAL PREDICTED HEART RATE: 141 BPM
MAXIMAL PREDICTED HEART RATE: 141 BPM
MCH RBC QN AUTO: 26.9 PG (ref 26.6–33)
MCH RBC QN AUTO: 27.8 PG (ref 26.6–33)
MCH RBC QN AUTO: 28.2 PG (ref 26.6–33)
MCH RBC QN AUTO: 28.4 PG (ref 26.6–33)
MCH RBC QN AUTO: 28.6 PG (ref 26.6–33)
MCH RBC QN AUTO: 28.7 PG (ref 26.6–33)
MCH RBC QN AUTO: 28.9 PG (ref 26.6–33)
MCH RBC QN AUTO: 29 PG (ref 26.6–33)
MCH RBC QN AUTO: 29 PG (ref 26.6–33)
MCH RBC QN AUTO: 29.1 PG (ref 26.6–33)
MCH RBC QN AUTO: 29.3 PG (ref 26.6–33)
MCH RBC QN AUTO: 29.5 PG (ref 26.6–33)
MCH RBC QN AUTO: 29.5 PG (ref 26.6–33)
MCH RBC QN AUTO: 29.6 PG (ref 26.6–33)
MCH RBC QN AUTO: 29.7 PG (ref 26.6–33)
MCH RBC QN AUTO: 29.8 PG (ref 26.6–33)
MCH RBC QN AUTO: 30.2 PG (ref 26.6–33)
MCH RBC QN AUTO: 30.3 PG (ref 26.6–33)
MCH RBC QN AUTO: 30.6 PG (ref 26.6–33)
MCH RBC QN AUTO: 30.9 PG (ref 26.6–33)
MCH RBC QN AUTO: 30.9 PG (ref 26.6–33)
MCH RBC QN AUTO: 31 PG (ref 26.6–33)
MCH RBC QN AUTO: 31.3 PG (ref 26.6–33)
MCH RBC QN AUTO: 31.5 PG (ref 26.6–33)
MCH RBC QN AUTO: 31.5 PG (ref 26.6–33)
MCH RBC QN AUTO: 31.7 PG (ref 26.6–33)
MCH RBC QN AUTO: 31.9 PG (ref 26.6–33)
MCH RBC QN AUTO: 32.1 PG (ref 26.6–33)
MCHC RBC AUTO-ENTMCNC: 28.9 G/DL (ref 31.5–35.7)
MCHC RBC AUTO-ENTMCNC: 29.4 G/DL (ref 31.5–35.7)
MCHC RBC AUTO-ENTMCNC: 29.8 G/DL (ref 31.5–35.7)
MCHC RBC AUTO-ENTMCNC: 30 G/DL (ref 31.5–35.7)
MCHC RBC AUTO-ENTMCNC: 30.3 G/DL (ref 31.5–35.7)
MCHC RBC AUTO-ENTMCNC: 30.3 G/DL (ref 31.5–35.7)
MCHC RBC AUTO-ENTMCNC: 30.7 G/DL (ref 31.5–35.7)
MCHC RBC AUTO-ENTMCNC: 30.7 G/DL (ref 31.5–35.7)
MCHC RBC AUTO-ENTMCNC: 30.8 G/DL (ref 31.5–35.7)
MCHC RBC AUTO-ENTMCNC: 30.8 G/DL (ref 31.5–35.7)
MCHC RBC AUTO-ENTMCNC: 30.9 G/DL (ref 31.5–35.7)
MCHC RBC AUTO-ENTMCNC: 31 G/DL (ref 31.5–35.7)
MCHC RBC AUTO-ENTMCNC: 31.2 G/DL (ref 31.5–35.7)
MCHC RBC AUTO-ENTMCNC: 31.2 G/DL (ref 31.5–35.7)
MCHC RBC AUTO-ENTMCNC: 31.3 G/DL (ref 31.5–35.7)
MCHC RBC AUTO-ENTMCNC: 31.4 G/DL (ref 31.5–35.7)
MCHC RBC AUTO-ENTMCNC: 31.5 G/DL (ref 31.5–35.7)
MCHC RBC AUTO-ENTMCNC: 31.7 G/DL (ref 31.5–35.7)
MCHC RBC AUTO-ENTMCNC: 31.8 G/DL (ref 31.5–35.7)
MCHC RBC AUTO-ENTMCNC: 31.8 G/DL (ref 31.5–35.7)
MCHC RBC AUTO-ENTMCNC: 32 G/DL (ref 31.5–35.7)
MCHC RBC AUTO-ENTMCNC: 32.1 G/DL (ref 31.5–35.7)
MCHC RBC AUTO-ENTMCNC: 32.4 G/DL (ref 31.5–35.7)
MCHC RBC AUTO-ENTMCNC: 32.5 G/DL (ref 31.5–35.7)
MCHC RBC AUTO-ENTMCNC: 32.5 G/DL (ref 31.5–35.7)
MCHC RBC AUTO-ENTMCNC: 33.1 G/DL (ref 31.5–35.7)
MCV RBC AUTO: 100.4 FL (ref 79–97)
MCV RBC AUTO: 100.7 FL (ref 79–97)
MCV RBC AUTO: 102.5 FL (ref 79–97)
MCV RBC AUTO: 102.8 FL (ref 79–97)
MCV RBC AUTO: 90 FL (ref 79–97)
MCV RBC AUTO: 90 FL (ref 79–97)
MCV RBC AUTO: 91.6 FL (ref 79–97)
MCV RBC AUTO: 92.6 FL (ref 79–97)
MCV RBC AUTO: 92.8 FL (ref 79–97)
MCV RBC AUTO: 92.8 FL (ref 79–97)
MCV RBC AUTO: 93.1 FL (ref 79–97)
MCV RBC AUTO: 93.5 FL (ref 79–97)
MCV RBC AUTO: 94.2 FL (ref 79–97)
MCV RBC AUTO: 94.2 FL (ref 79–97)
MCV RBC AUTO: 94.4 FL (ref 79–97)
MCV RBC AUTO: 94.4 FL (ref 79–97)
MCV RBC AUTO: 94.8 FL (ref 79–97)
MCV RBC AUTO: 95.8 FL (ref 79–97)
MCV RBC AUTO: 95.9 FL (ref 79–97)
MCV RBC AUTO: 96.1 FL (ref 79–97)
MCV RBC AUTO: 96.2 FL (ref 79–97)
MCV RBC AUTO: 96.2 FL (ref 79–97)
MCV RBC AUTO: 96.4 FL (ref 79–97)
MCV RBC AUTO: 96.6 FL (ref 79–97)
MCV RBC AUTO: 97.2 FL (ref 79–97)
MCV RBC AUTO: 97.2 FL (ref 79–97)
MCV RBC AUTO: 97.4 FL (ref 79–97)
MCV RBC AUTO: 97.5 FL (ref 79–97)
MCV RBC AUTO: 98.4 FL (ref 79–97)
MCV RBC AUTO: 98.9 FL (ref 79–97)
MCV RBC AUTO: 99.2 FL (ref 79–97)
MCV RBC AUTO: 99.6 FL (ref 79–97)
MONOCYTES # BLD AUTO: 0.11 10*3/MM3 (ref 0.1–0.9)
MONOCYTES # BLD AUTO: 0.12 10*3/MM3 (ref 0.1–0.9)
MONOCYTES # BLD AUTO: 0.14 10*3/MM3 (ref 0.1–0.9)
MONOCYTES # BLD AUTO: 0.16 10*3/MM3 (ref 0.1–0.9)
MONOCYTES # BLD AUTO: 0.16 10*3/MM3 (ref 0.1–0.9)
MONOCYTES # BLD AUTO: 0.18 10*3/MM3 (ref 0.1–0.9)
MONOCYTES # BLD AUTO: 0.18 10*3/MM3 (ref 0.1–0.9)
MONOCYTES # BLD AUTO: 0.2 10*3/MM3 (ref 0.1–0.9)
MONOCYTES # BLD AUTO: 0.22 10*3/MM3 (ref 0.1–0.9)
MONOCYTES # BLD AUTO: 0.23 10*3/MM3 (ref 0.1–0.9)
MONOCYTES # BLD AUTO: 0.23 10*3/MM3 (ref 0.1–0.9)
MONOCYTES # BLD AUTO: 0.25 10*3/MM3 (ref 0.1–0.9)
MONOCYTES # BLD AUTO: 0.25 10*3/MM3 (ref 0.1–0.9)
MONOCYTES # BLD AUTO: 0.26 10*3/MM3 (ref 0.1–0.9)
MONOCYTES # BLD AUTO: 0.26 10*3/MM3 (ref 0.1–0.9)
MONOCYTES # BLD AUTO: 0.28 10*3/MM3 (ref 0.1–0.9)
MONOCYTES # BLD AUTO: 0.28 10*3/MM3 (ref 0.1–0.9)
MONOCYTES # BLD AUTO: 0.29 10*3/MM3 (ref 0.1–0.9)
MONOCYTES # BLD AUTO: 0.3 10*3/MM3 (ref 0.1–0.9)
MONOCYTES # BLD AUTO: 0.32 10*3/MM3 (ref 0.1–0.9)
MONOCYTES # BLD AUTO: 0.33 10*3/MM3 (ref 0.1–0.9)
MONOCYTES # BLD AUTO: 0.33 10*3/MM3 (ref 0.1–0.9)
MONOCYTES # BLD AUTO: 0.34 10*3/MM3 (ref 0.1–0.9)
MONOCYTES # BLD AUTO: 0.35 10*3/MM3 (ref 0.1–0.9)
MONOCYTES # BLD AUTO: 0.37 10*3/MM3 (ref 0.1–0.9)
MONOCYTES # BLD AUTO: 0.4 10*3/MM3 (ref 0.1–0.9)
MONOCYTES # BLD AUTO: 0.41 10*3/MM3 (ref 0.1–0.9)
MONOCYTES # BLD AUTO: 0.43 10*3/MM3 (ref 0.1–0.9)
MONOCYTES # BLD AUTO: 0.45 10*3/MM3 (ref 0.1–0.9)
MONOCYTES # BLD AUTO: 0.52 10*3/MM3 (ref 0.1–0.9)
MONOCYTES NFR BLD AUTO: 3.1 % (ref 5–12)
MONOCYTES NFR BLD AUTO: 3.1 % (ref 5–12)
MONOCYTES NFR BLD AUTO: 3.3 % (ref 5–12)
MONOCYTES NFR BLD AUTO: 3.6 % (ref 5–12)
MONOCYTES NFR BLD AUTO: 3.8 % (ref 5–12)
MONOCYTES NFR BLD AUTO: 4 % (ref 5–12)
MONOCYTES NFR BLD AUTO: 4.2 % (ref 5–12)
MONOCYTES NFR BLD AUTO: 4.2 % (ref 5–12)
MONOCYTES NFR BLD AUTO: 4.3 % (ref 5–12)
MONOCYTES NFR BLD AUTO: 4.3 % (ref 5–12)
MONOCYTES NFR BLD AUTO: 4.7 % (ref 5–12)
MONOCYTES NFR BLD AUTO: 4.8 % (ref 5–12)
MONOCYTES NFR BLD AUTO: 5.2 % (ref 5–12)
MONOCYTES NFR BLD AUTO: 5.4 % (ref 5–12)
MONOCYTES NFR BLD AUTO: 5.4 % (ref 5–12)
MONOCYTES NFR BLD AUTO: 5.5 % (ref 5–12)
MONOCYTES NFR BLD AUTO: 5.5 % (ref 5–12)
MONOCYTES NFR BLD AUTO: 5.6 % (ref 5–12)
MONOCYTES NFR BLD AUTO: 5.6 % (ref 5–12)
MONOCYTES NFR BLD AUTO: 5.7 % (ref 5–12)
MONOCYTES NFR BLD AUTO: 5.9 % (ref 5–12)
MONOCYTES NFR BLD AUTO: 5.9 % (ref 5–12)
MONOCYTES NFR BLD AUTO: 6.1 % (ref 5–12)
MONOCYTES NFR BLD AUTO: 6.2 % (ref 5–12)
MONOCYTES NFR BLD AUTO: 6.3 % (ref 5–12)
MONOCYTES NFR BLD AUTO: 6.4 % (ref 5–12)
MONOCYTES NFR BLD AUTO: 7.1 % (ref 5–12)
MONOCYTES NFR BLD AUTO: 7.3 % (ref 5–12)
MONOCYTES NFR BLD AUTO: 8.1 % (ref 5–12)
MONOCYTES NFR BLD AUTO: 9 % (ref 5–12)
MRSA SPEC QL CULT: NORMAL
NEUTROPHILS NFR BLD AUTO: 1.13 10*3/MM3 (ref 1.7–7)
NEUTROPHILS NFR BLD AUTO: 1.24 10*3/MM3 (ref 1.7–7)
NEUTROPHILS NFR BLD AUTO: 1.59 10*3/MM3 (ref 1.7–7)
NEUTROPHILS NFR BLD AUTO: 1.83 10*3/MM3 (ref 1.7–7)
NEUTROPHILS NFR BLD AUTO: 2.04 10*3/MM3 (ref 1.7–7)
NEUTROPHILS NFR BLD AUTO: 2.07 10*3/MM3 (ref 1.7–7)
NEUTROPHILS NFR BLD AUTO: 2.13 10*3/MM3 (ref 1.7–7)
NEUTROPHILS NFR BLD AUTO: 2.23 10*3/MM3 (ref 1.7–7)
NEUTROPHILS NFR BLD AUTO: 2.23 10*3/MM3 (ref 1.7–7)
NEUTROPHILS NFR BLD AUTO: 2.35 10*3/MM3 (ref 1.7–7)
NEUTROPHILS NFR BLD AUTO: 2.37 10*3/MM3 (ref 1.7–7)
NEUTROPHILS NFR BLD AUTO: 2.39 10*3/MM3 (ref 1.7–7)
NEUTROPHILS NFR BLD AUTO: 2.45 10*3/MM3 (ref 1.7–7)
NEUTROPHILS NFR BLD AUTO: 2.48 10*3/MM3 (ref 1.7–7)
NEUTROPHILS NFR BLD AUTO: 2.48 10*3/MM3 (ref 1.7–7)
NEUTROPHILS NFR BLD AUTO: 2.55 10*3/MM3 (ref 1.7–7)
NEUTROPHILS NFR BLD AUTO: 2.56 10*3/MM3 (ref 1.7–7)
NEUTROPHILS NFR BLD AUTO: 2.85 10*3/MM3 (ref 1.7–7)
NEUTROPHILS NFR BLD AUTO: 2.88 10*3/MM3 (ref 1.7–7)
NEUTROPHILS NFR BLD AUTO: 3.15 10*3/MM3 (ref 1.7–7)
NEUTROPHILS NFR BLD AUTO: 3.4 10*3/MM3 (ref 1.7–7)
NEUTROPHILS NFR BLD AUTO: 3.45 10*3/MM3 (ref 1.7–7)
NEUTROPHILS NFR BLD AUTO: 3.54 10*3/MM3 (ref 1.7–7)
NEUTROPHILS NFR BLD AUTO: 3.74 10*3/MM3 (ref 1.7–7)
NEUTROPHILS NFR BLD AUTO: 3.76 10*3/MM3 (ref 1.7–7)
NEUTROPHILS NFR BLD AUTO: 3.78 10*3/MM3 (ref 1.7–7)
NEUTROPHILS NFR BLD AUTO: 3.88 10*3/MM3 (ref 1.7–7)
NEUTROPHILS NFR BLD AUTO: 3.91 10*3/MM3 (ref 1.7–7)
NEUTROPHILS NFR BLD AUTO: 34.7 % (ref 42.7–76)
NEUTROPHILS NFR BLD AUTO: 4.02 10*3/MM3 (ref 1.7–7)
NEUTROPHILS NFR BLD AUTO: 4.49 10*3/MM3 (ref 1.7–7)
NEUTROPHILS NFR BLD AUTO: 40.7 % (ref 42.7–76)
NEUTROPHILS NFR BLD AUTO: 41.7 % (ref 42.7–76)
NEUTROPHILS NFR BLD AUTO: 45.3 % (ref 42.7–76)
NEUTROPHILS NFR BLD AUTO: 46.5 % (ref 42.7–76)
NEUTROPHILS NFR BLD AUTO: 47.8 % (ref 42.7–76)
NEUTROPHILS NFR BLD AUTO: 48.9 % (ref 42.7–76)
NEUTROPHILS NFR BLD AUTO: 49.1 % (ref 42.7–76)
NEUTROPHILS NFR BLD AUTO: 49.8 % (ref 42.7–76)
NEUTROPHILS NFR BLD AUTO: 5.36 10*3/MM3 (ref 1.7–7)
NEUTROPHILS NFR BLD AUTO: 50.3 % (ref 42.7–76)
NEUTROPHILS NFR BLD AUTO: 51.5 % (ref 42.7–76)
NEUTROPHILS NFR BLD AUTO: 52.1 % (ref 42.7–76)
NEUTROPHILS NFR BLD AUTO: 52.1 % (ref 42.7–76)
NEUTROPHILS NFR BLD AUTO: 53.3 % (ref 42.7–76)
NEUTROPHILS NFR BLD AUTO: 53.4 % (ref 42.7–76)
NEUTROPHILS NFR BLD AUTO: 53.6 % (ref 42.7–76)
NEUTROPHILS NFR BLD AUTO: 54.3 % (ref 42.7–76)
NEUTROPHILS NFR BLD AUTO: 54.4 % (ref 42.7–76)
NEUTROPHILS NFR BLD AUTO: 55.5 % (ref 42.7–76)
NEUTROPHILS NFR BLD AUTO: 55.9 % (ref 42.7–76)
NEUTROPHILS NFR BLD AUTO: 58.3 % (ref 42.7–76)
NEUTROPHILS NFR BLD AUTO: 58.4 % (ref 42.7–76)
NEUTROPHILS NFR BLD AUTO: 59.6 % (ref 42.7–76)
NEUTROPHILS NFR BLD AUTO: 6.79 10*3/MM3 (ref 1.7–7)
NEUTROPHILS NFR BLD AUTO: 61.9 % (ref 42.7–76)
NEUTROPHILS NFR BLD AUTO: 63.5 % (ref 42.7–76)
NEUTROPHILS NFR BLD AUTO: 65.3 % (ref 42.7–76)
NEUTROPHILS NFR BLD AUTO: 65.6 % (ref 42.7–76)
NEUTROPHILS NFR BLD AUTO: 66.1 % (ref 42.7–76)
NEUTROPHILS NFR BLD AUTO: 70.3 % (ref 42.7–76)
NEUTROPHILS NFR BLD AUTO: 74.3 % (ref 42.7–76)
NEUTROPHILS NFR BLD AUTO: 74.8 % (ref 42.7–76)
NEUTROPHILS NFR BLD AUTO: 82 % (ref 42.7–76)
NITRITE UR QL STRIP: NEGATIVE
NITRITE UR QL STRIP: NEGATIVE
NITRITE UR-MCNC: NEGATIVE MG/ML
NRBC BLD AUTO-RTO: 0 /100 WBC (ref 0–0.2)
NRBC BLD AUTO-RTO: 0.2 /100 WBC (ref 0–0.2)
NRBC BLD AUTO-RTO: 0.3 /100 WBC (ref 0–0.2)
NRBC BLD AUTO-RTO: 0.3 /100 WBC (ref 0–0.2)
NRBC BLD AUTO-RTO: 0.4 /100 WBC (ref 0–0.2)
NT-PROBNP SERPL-MCNC: 1487 PG/ML (ref 0–1800)
NT-PROBNP SERPL-MCNC: 331.1 PG/ML (ref 0–1800)
NT-PROBNP SERPL-MCNC: 3842 PG/ML (ref 0–1800)
PATH REPORT.FINAL DX SPEC: NORMAL
PATH REPORT.FINAL DX SPEC: NORMAL
PATH REPORT.GROSS SPEC: NORMAL
PATH REPORT.GROSS SPEC: NORMAL
PATH REPORT.RELEVANT HX SPEC: NORMAL
PATH REPORT.SITE OF ORIGIN SPEC: NORMAL
PATHOLOGIST NAME: NORMAL
PAYMENT PROCEDURE: NORMAL
PERCENT MAX PREDICTED HR: 60.28 %
PH UR STRIP.AUTO: 6 [PH] (ref 4.5–8)
PH UR STRIP.AUTO: <=5 [PH] (ref 4.5–8)
PH UR: 5 [PH] (ref 5–8)
PHOSPHATE SERPL-MCNC: 3.1 MG/DL (ref 2.5–4.5)
PHOSPHATE SERPL-MCNC: 3.1 MG/DL (ref 2.5–4.5)
PHOSPHATE SERPL-MCNC: 3.6 MG/DL (ref 2.5–4.5)
PLAT MORPH BLD: NORMAL
PLATELET # BLD AUTO: 125 10*3/MM3 (ref 140–450)
PLATELET # BLD AUTO: 145 10*3/MM3 (ref 140–450)
PLATELET # BLD AUTO: 159 10*3/MM3 (ref 140–450)
PLATELET # BLD AUTO: 169 10*3/MM3 (ref 140–450)
PLATELET # BLD AUTO: 231 10*3/MM3 (ref 140–450)
PLATELET # BLD AUTO: 235 10*3/MM3 (ref 140–450)
PLATELET # BLD AUTO: 235 10*3/MM3 (ref 140–450)
PLATELET # BLD AUTO: 237 10*3/MM3 (ref 140–450)
PLATELET # BLD AUTO: 238 10*3/MM3 (ref 140–450)
PLATELET # BLD AUTO: 238 10*3/MM3 (ref 140–450)
PLATELET # BLD AUTO: 243 10*3/MM3 (ref 140–450)
PLATELET # BLD AUTO: 244 10*3/MM3 (ref 140–450)
PLATELET # BLD AUTO: 248 10*3/MM3 (ref 140–450)
PLATELET # BLD AUTO: 263 10*3/MM3 (ref 140–450)
PLATELET # BLD AUTO: 269 10*3/MM3 (ref 140–450)
PLATELET # BLD AUTO: 277 10*3/MM3 (ref 140–450)
PLATELET # BLD AUTO: 287 10*3/MM3 (ref 140–450)
PLATELET # BLD AUTO: 289 10*3/MM3 (ref 140–450)
PLATELET # BLD AUTO: 290 10*3/MM3 (ref 140–450)
PLATELET # BLD AUTO: 290 10*3/MM3 (ref 140–450)
PLATELET # BLD AUTO: 292 10*3/MM3 (ref 140–450)
PLATELET # BLD AUTO: 294 10*3/MM3 (ref 140–450)
PLATELET # BLD AUTO: 300 10*3/MM3 (ref 140–450)
PLATELET # BLD AUTO: 308 10*3/MM3 (ref 140–450)
PLATELET # BLD AUTO: 310 10*3/MM3 (ref 140–450)
PLATELET # BLD AUTO: 311 10*3/MM3 (ref 140–450)
PLATELET # BLD AUTO: 312 10*3/MM3 (ref 140–450)
PLATELET # BLD AUTO: 315 10*3/MM3 (ref 140–450)
PLATELET # BLD AUTO: 321 10*3/MM3 (ref 140–450)
PLATELET # BLD AUTO: 326 10*3/MM3 (ref 140–450)
PLATELET # BLD AUTO: 330 10*3/MM3 (ref 140–450)
PLATELET # BLD AUTO: 338 10*3/MM3 (ref 140–450)
PMV BLD AUTO: 11.1 FL (ref 6–12)
PMV BLD AUTO: 11.3 FL (ref 6–12)
PMV BLD AUTO: 11.4 FL (ref 6–12)
PMV BLD AUTO: 11.4 FL (ref 6–12)
PMV BLD AUTO: 11.5 FL (ref 6–12)
PMV BLD AUTO: 11.6 FL (ref 6–12)
PMV BLD AUTO: 11.7 FL (ref 6–12)
PMV BLD AUTO: 11.8 FL (ref 6–12)
PMV BLD AUTO: 11.8 FL (ref 6–12)
PMV BLD AUTO: 11.9 FL (ref 6–12)
PMV BLD AUTO: 12 FL (ref 6–12)
PMV BLD AUTO: 12 FL (ref 6–12)
PMV BLD AUTO: 12.1 FL (ref 6–12)
PMV BLD AUTO: 12.2 FL (ref 6–12)
PMV BLD AUTO: 12.3 FL (ref 6–12)
PMV BLD AUTO: 12.4 FL (ref 6–12)
PMV BLD AUTO: 12.5 FL (ref 6–12)
PMV BLD AUTO: 12.6 FL (ref 6–12)
PMV BLD AUTO: 12.9 FL (ref 6–12)
PMV BLD AUTO: 12.9 FL (ref 6–12)
PMV BLD AUTO: 13.4 FL (ref 6–12)
POIKILOCYTOSIS BLD QL SMEAR: NORMAL
POTASSIUM SERPL-SCNC: 3.9 MMOL/L (ref 3.5–5.2)
POTASSIUM SERPL-SCNC: 4 MMOL/L (ref 3.5–5.2)
POTASSIUM SERPL-SCNC: 4.2 MMOL/L (ref 3.5–5.2)
POTASSIUM SERPL-SCNC: 4.3 MMOL/L (ref 3.5–5.2)
POTASSIUM SERPL-SCNC: 4.5 MMOL/L (ref 3.5–5.2)
POTASSIUM SERPL-SCNC: 4.7 MMOL/L (ref 3.5–5.2)
POTASSIUM SERPL-SCNC: 4.8 MMOL/L (ref 3.5–5.2)
POTASSIUM SERPL-SCNC: 4.9 MMOL/L (ref 3.5–5.2)
POTASSIUM SERPL-SCNC: 5.1 MMOL/L (ref 3.5–5.2)
POTASSIUM SERPL-SCNC: 5.7 MMOL/L (ref 3.5–5.2)
POTASSIUM SERPL-SCNC: 6.2 MMOL/L (ref 3.5–5.2)
PROCALCITONIN SERPL-MCNC: 0.44 NG/ML (ref 0–0.25)
PROCALCITONIN SERPL-MCNC: 0.51 NG/ML (ref 0–0.25)
PROCALCITONIN SERPL-MCNC: 0.54 NG/ML (ref 0–0.25)
PROCALCITONIN SERPL-MCNC: 0.56 NG/ML (ref 0–0.25)
PROCALCITONIN SERPL-MCNC: 0.71 NG/ML (ref 0–0.25)
PROCALCITONIN SERPL-MCNC: 1.04 NG/ML (ref 0–0.25)
PROT PATTERN SERPL IFE-IMP: ABNORMAL
PROT SERPL-MCNC: 5.3 G/DL (ref 6–8.5)
PROT SERPL-MCNC: 5.5 G/DL (ref 6–8.5)
PROT SERPL-MCNC: 5.6 G/DL (ref 6–8.5)
PROT SERPL-MCNC: 5.7 G/DL (ref 6–8.5)
PROT SERPL-MCNC: 5.7 G/DL (ref 6–8.5)
PROT SERPL-MCNC: 6.1 G/DL (ref 6–8.5)
PROT SERPL-MCNC: 6.1 G/DL (ref 6–8.5)
PROT SERPL-MCNC: 6.3 G/DL (ref 6–8.5)
PROT SERPL-MCNC: 6.6 G/DL (ref 6–8.5)
PROT SERPL-MCNC: 6.6 G/DL (ref 6–8.5)
PROT UR QL STRIP: ABNORMAL
PROT UR QL STRIP: ABNORMAL
PROT UR STRIP-MCNC: NEGATIVE MG/DL
PROTHROMBIN TIME: 18.7 SECONDS (ref 12.1–15)
PROTHROMBIN TIME: 20.8 SECONDS (ref 12.1–15)
PROTHROMBIN TIME: 25.1 SECONDS (ref 12.1–15)
QT INTERVAL: 330 MS
QT INTERVAL: 382 MS
QT INTERVAL: 404 MS
QT INTERVAL: 416 MS
QT INTERVAL: 418 MS
QT INTERVAL: 420 MS
QT INTERVAL: 445 MS
QT INTERVAL: 458 MS
RBC # BLD AUTO: 2.48 10*6/MM3 (ref 3.77–5.28)
RBC # BLD AUTO: 2.54 10*6/MM3 (ref 3.77–5.28)
RBC # BLD AUTO: 2.59 10*6/MM3 (ref 3.77–5.28)
RBC # BLD AUTO: 2.65 10*6/MM3 (ref 3.77–5.28)
RBC # BLD AUTO: 2.66 10*6/MM3 (ref 3.77–5.28)
RBC # BLD AUTO: 2.66 10*6/MM3 (ref 3.77–5.28)
RBC # BLD AUTO: 2.68 10*6/MM3 (ref 3.77–5.28)
RBC # BLD AUTO: 2.7 10*6/MM3 (ref 3.77–5.28)
RBC # BLD AUTO: 2.72 10*6/MM3 (ref 3.77–5.28)
RBC # BLD AUTO: 2.77 10*6/MM3 (ref 3.77–5.28)
RBC # BLD AUTO: 2.79 10*6/MM3 (ref 3.77–5.28)
RBC # BLD AUTO: 2.92 10*6/MM3 (ref 3.77–5.28)
RBC # BLD AUTO: 3.04 10*6/MM3 (ref 3.77–5.28)
RBC # BLD AUTO: 3.15 10*6/MM3 (ref 3.77–5.28)
RBC # BLD AUTO: 3.17 10*6/MM3 (ref 3.77–5.28)
RBC # BLD AUTO: 3.2 10*6/MM3 (ref 3.77–5.28)
RBC # BLD AUTO: 3.24 10*6/MM3 (ref 3.77–5.28)
RBC # BLD AUTO: 3.26 10*6/MM3 (ref 3.77–5.28)
RBC # BLD AUTO: 3.3 10*6/MM3 (ref 3.77–5.28)
RBC # BLD AUTO: 3.35 10*6/MM3 (ref 3.77–5.28)
RBC # BLD AUTO: 3.38 10*6/MM3 (ref 3.77–5.28)
RBC # BLD AUTO: 3.42 10*6/MM3 (ref 3.77–5.28)
RBC # BLD AUTO: 3.45 10*6/MM3 (ref 3.77–5.28)
RBC # BLD AUTO: 3.48 10*6/MM3 (ref 3.77–5.28)
RBC # BLD AUTO: 3.49 10*6/MM3 (ref 3.77–5.28)
RBC # BLD AUTO: 3.63 10*6/MM3 (ref 3.77–5.28)
RBC # BLD AUTO: 3.66 10*6/MM3 (ref 3.77–5.28)
RBC # BLD AUTO: 3.76 10*6/MM3 (ref 3.77–5.28)
RBC # BLD AUTO: 3.79 10*6/MM3 (ref 3.77–5.28)
RBC # BLD AUTO: 3.83 10*6/MM3 (ref 3.77–5.28)
RBC # BLD AUTO: 3.87 10*6/MM3 (ref 3.77–5.28)
RBC # BLD AUTO: 3.88 10*6/MM3 (ref 3.77–5.28)
RBC # UR STRIP: NEGATIVE /UL
RBC # UR: ABNORMAL /HPF
RBC # UR: ABNORMAL /HPF
REF LAB TEST METHOD: ABNORMAL
REF LAB TEST METHOD: ABNORMAL
RH BLD: POSITIVE
RHINOVIRUS RNA SPEC NAA+PROBE: NOT DETECTED
RSV RNA NPH QL NAA+NON-PROBE: NOT DETECTED
S PNEUM AG SPEC QL LA: NEGATIVE
SARS-COV-2 RNA PNL SPEC NAA+PROBE: NOT DETECTED
SARS-COV-2 RNA PNL SPEC NAA+PROBE: NOT DETECTED
SODIUM SERPL-SCNC: 133 MMOL/L (ref 136–145)
SODIUM SERPL-SCNC: 133 MMOL/L (ref 136–145)
SODIUM SERPL-SCNC: 135 MMOL/L (ref 136–145)
SODIUM SERPL-SCNC: 136 MMOL/L (ref 136–145)
SODIUM SERPL-SCNC: 137 MMOL/L (ref 136–145)
SODIUM SERPL-SCNC: 139 MMOL/L (ref 136–145)
SODIUM SERPL-SCNC: 142 MMOL/L (ref 136–145)
SODIUM SERPL-SCNC: 142 MMOL/L (ref 136–145)
SODIUM SERPL-SCNC: 143 MMOL/L (ref 136–145)
SODIUM UR-SCNC: 65 MMOL/L
SP GR UR STRIP: 1.01 (ref 1–1.03)
SP GR UR STRIP: 1.02 (ref 1–1.03)
SP GR UR: 1.01 (ref 1–1.03)
SQUAMOUS #/AREA URNS HPF: ABNORMAL /HPF
SQUAMOUS #/AREA URNS HPF: ABNORMAL /HPF
STRESS BASELINE BP: NORMAL MMHG
STRESS BASELINE HR: 71 BPM
STRESS O2 SAT REST: 99 %
STRESS PERCENT HR: 71 %
STRESS POST ESTIMATED WORKLOAD: 1 METS
STRESS POST EXERCISE DUR SEC: 30 SEC
STRESS POST O2 SAT PEAK: 100 %
STRESS POST PEAK BP: NORMAL MMHG
STRESS POST PEAK HR: 85 BPM
STRESS TARGET HR: 120 BPM
STRESS TARGET HR: 120 BPM
T&S EXPIRATION DATE: NORMAL
T4 FREE SERPL-MCNC: 1.41 NG/DL (ref 0.93–1.7)
TIBC SERPL-MCNC: 121 MCG/DL (ref 298–536)
TIBC SERPL-MCNC: ABNORMAL UG/DL
TROPONIN T SERPL-MCNC: <0.01 NG/ML (ref 0–0.03)
TSH SERPL DL<=0.05 MIU/L-ACNC: 7.19 UIU/ML (ref 0.27–4.2)
UIBC SERPL-MCNC: 45 MCG/DL (ref 112–346)
UIBC SERPL-MCNC: <16 MCG/DL (ref 112–346)
UNIT  ABO: NORMAL
UNIT  RH: NORMAL
UROBILINOGEN UR QL STRIP: ABNORMAL
UROBILINOGEN UR QL STRIP: ABNORMAL
UROBILINOGEN UR QL: NORMAL
WBC # BLD AUTO: 2.77 10*3/MM3 (ref 3.4–10.8)
WBC # BLD AUTO: 2.79 10*3/MM3 (ref 3.4–10.8)
WBC # BLD AUTO: 2.87 10*3/MM3 (ref 3.4–10.8)
WBC # BLD AUTO: 3.56 10*3/MM3 (ref 3.4–10.8)
WBC # BLD AUTO: 3.8 10*3/MM3 (ref 3.4–10.8)
WBC # BLD AUTO: 3.8 10*3/MM3 (ref 3.4–10.8)
WBC # BLD AUTO: 3.83 10*3/MM3 (ref 3.4–10.8)
WBC # BLD AUTO: 3.87 10*3/MM3 (ref 3.4–10.8)
WBC # BLD AUTO: 4.16 10*3/MM3 (ref 3.4–10.8)
WBC # BLD AUTO: 4.21 10*3/MM3 (ref 3.4–10.8)
WBC # BLD AUTO: 4.47 10*3/MM3 (ref 3.4–10.8)
WBC # BLD AUTO: 4.67 10*3/MM3 (ref 3.4–10.8)
WBC # BLD AUTO: 4.81 10*3/MM3 (ref 3.4–10.8)
WBC # BLD AUTO: 4.83 10*3/MM3 (ref 3.4–10.8)
WBC # BLD AUTO: 5.07 10*3/MM3 (ref 3.4–10.8)
WBC # BLD AUTO: 5.12 10*3/MM3 (ref 3.4–10.8)
WBC # BLD AUTO: 5.14 10*3/MM3 (ref 3.4–10.8)
WBC # BLD AUTO: 5.41 10*3/MM3 (ref 3.4–10.8)
WBC # BLD AUTO: 5.48 10*3/MM3 (ref 3.4–10.8)
WBC # BLD AUTO: 5.49 10*3/MM3 (ref 3.4–10.8)
WBC # BLD AUTO: 5.53 10*3/MM3 (ref 3.4–10.8)
WBC # BLD AUTO: 5.72 10*3/MM3 (ref 3.4–10.8)
WBC # BLD AUTO: 5.94 10*3/MM3 (ref 3.4–10.8)
WBC # BLD AUTO: 5.96 10*3/MM3 (ref 3.4–10.8)
WBC # BLD AUTO: 6.18 10*3/MM3 (ref 3.4–10.8)
WBC # BLD AUTO: 6.43 10*3/MM3 (ref 3.4–10.8)
WBC # BLD AUTO: 6.67 10*3/MM3 (ref 3.4–10.8)
WBC # BLD AUTO: 7.18 10*3/MM3 (ref 3.4–10.8)
WBC # BLD AUTO: 7.27 10*3/MM3 (ref 3.4–10.8)
WBC # BLD AUTO: 8.12 10*3/MM3 (ref 3.4–10.8)
WBC # BLD AUTO: 8.28 10*3/MM3 (ref 3.4–10.8)
WBC # BLD AUTO: 8.29 10*3/MM3 (ref 3.4–10.8)
WBC MORPH BLD: NORMAL
WBC UR QL AUTO: ABNORMAL /HPF
WBC UR QL AUTO: ABNORMAL /HPF
WHOLE BLOOD HOLD SPECIMEN: NORMAL
WHOLE BLOOD HOLD SPECIMEN: NORMAL

## 2021-01-01 PROCEDURE — 71250 CT THORAX DX C-: CPT

## 2021-01-01 PROCEDURE — 83605 ASSAY OF LACTIC ACID: CPT | Performed by: EMERGENCY MEDICINE

## 2021-01-01 PROCEDURE — P9016 RBC LEUKOCYTES REDUCED: HCPCS

## 2021-01-01 PROCEDURE — 25010000002 ONDANSETRON PER 1 MG: Performed by: NURSE PRACTITIONER

## 2021-01-01 PROCEDURE — 86850 RBC ANTIBODY SCREEN: CPT | Performed by: INTERNAL MEDICINE

## 2021-01-01 PROCEDURE — 82962 GLUCOSE BLOOD TEST: CPT

## 2021-01-01 PROCEDURE — 82728 ASSAY OF FERRITIN: CPT | Performed by: NURSE PRACTITIONER

## 2021-01-01 PROCEDURE — 36415 COLL VENOUS BLD VENIPUNCTURE: CPT

## 2021-01-01 PROCEDURE — 96372 THER/PROPH/DIAG INJ SC/IM: CPT

## 2021-01-01 PROCEDURE — 85025 COMPLETE CBC W/AUTO DIFF WBC: CPT | Performed by: INTERNAL MEDICINE

## 2021-01-01 PROCEDURE — 84484 ASSAY OF TROPONIN QUANT: CPT | Performed by: EMERGENCY MEDICINE

## 2021-01-01 PROCEDURE — 97116 GAIT TRAINING THERAPY: CPT

## 2021-01-01 PROCEDURE — 96374 THER/PROPH/DIAG INJ IV PUSH: CPT

## 2021-01-01 PROCEDURE — 86923 COMPATIBILITY TEST ELECTRIC: CPT

## 2021-01-01 PROCEDURE — 36430 TRANSFUSION BLD/BLD COMPNT: CPT

## 2021-01-01 PROCEDURE — 85652 RBC SED RATE AUTOMATED: CPT | Performed by: NURSE PRACTITIONER

## 2021-01-01 PROCEDURE — 80076 HEPATIC FUNCTION PANEL: CPT | Performed by: NURSE PRACTITIONER

## 2021-01-01 PROCEDURE — 86901 BLOOD TYPING SEROLOGIC RH(D): CPT | Performed by: INTERNAL MEDICINE

## 2021-01-01 PROCEDURE — 93306 TTE W/DOPPLER COMPLETE: CPT

## 2021-01-01 PROCEDURE — 84145 PROCALCITONIN (PCT): CPT | Performed by: NURSE PRACTITIONER

## 2021-01-01 PROCEDURE — 63710000001 DIPHENHYDRAMINE PER 50 MG: Performed by: INTERNAL MEDICINE

## 2021-01-01 PROCEDURE — 97161 PT EVAL LOW COMPLEX 20 MIN: CPT

## 2021-01-01 PROCEDURE — 94799 UNLISTED PULMONARY SVC/PX: CPT

## 2021-01-01 PROCEDURE — 82306 VITAMIN D 25 HYDROXY: CPT | Performed by: NURSE PRACTITIONER

## 2021-01-01 PROCEDURE — 87899 AGENT NOS ASSAY W/OPTIC: CPT | Performed by: NURSE PRACTITIONER

## 2021-01-01 PROCEDURE — 82565 ASSAY OF CREATININE: CPT | Performed by: NURSE PRACTITIONER

## 2021-01-01 PROCEDURE — 25010000002 EPOETIN ALFA PER 1000 UNITS: Performed by: INTERNAL MEDICINE

## 2021-01-01 PROCEDURE — 43239 EGD BIOPSY SINGLE/MULTIPLE: CPT | Performed by: INTERNAL MEDICINE

## 2021-01-01 PROCEDURE — 83880 ASSAY OF NATRIURETIC PEPTIDE: CPT | Performed by: EMERGENCY MEDICINE

## 2021-01-01 PROCEDURE — 85007 BL SMEAR W/DIFF WBC COUNT: CPT | Performed by: NURSE PRACTITIONER

## 2021-01-01 PROCEDURE — 99223 1ST HOSP IP/OBS HIGH 75: CPT | Performed by: INTERNAL MEDICINE

## 2021-01-01 PROCEDURE — 86900 BLOOD TYPING SEROLOGIC ABO: CPT

## 2021-01-01 PROCEDURE — 84439 ASSAY OF FREE THYROXINE: CPT | Performed by: EMERGENCY MEDICINE

## 2021-01-01 PROCEDURE — 99284 EMERGENCY DEPT VISIT MOD MDM: CPT

## 2021-01-01 PROCEDURE — 63710000001 INSULIN ASPART PER 5 UNITS: Performed by: INTERNAL MEDICINE

## 2021-01-01 PROCEDURE — G0179 MD RECERTIFICATION HHA PT: HCPCS | Performed by: INTERNAL MEDICINE

## 2021-01-01 PROCEDURE — 0001A: CPT | Performed by: OBSTETRICS & GYNECOLOGY

## 2021-01-01 PROCEDURE — 25010000002 FUROSEMIDE PER 20 MG: Performed by: NURSE PRACTITIONER

## 2021-01-01 PROCEDURE — 80069 RENAL FUNCTION PANEL: CPT | Performed by: HOSPITALIST

## 2021-01-01 PROCEDURE — 25010000002 FUROSEMIDE PER 20 MG: Performed by: INTERNAL MEDICINE

## 2021-01-01 PROCEDURE — 87081 CULTURE SCREEN ONLY: CPT | Performed by: NURSE PRACTITIONER

## 2021-01-01 PROCEDURE — 80053 COMPREHEN METABOLIC PANEL: CPT | Performed by: EMERGENCY MEDICINE

## 2021-01-01 PROCEDURE — A9500 TC99M SESTAMIBI: HCPCS | Performed by: NURSE PRACTITIONER

## 2021-01-01 PROCEDURE — 85652 RBC SED RATE AUTOMATED: CPT | Performed by: EMERGENCY MEDICINE

## 2021-01-01 PROCEDURE — 86140 C-REACTIVE PROTEIN: CPT | Performed by: NURSE PRACTITIONER

## 2021-01-01 PROCEDURE — 99214 OFFICE O/P EST MOD 30 MIN: CPT | Performed by: INTERNAL MEDICINE

## 2021-01-01 PROCEDURE — 84145 PROCALCITONIN (PCT): CPT | Performed by: EMERGENCY MEDICINE

## 2021-01-01 PROCEDURE — A9270 NON-COVERED ITEM OR SERVICE: HCPCS | Performed by: INTERNAL MEDICINE

## 2021-01-01 PROCEDURE — 25010000002 FILGRASTIM 300 MCG/0.5ML SOLUTION PREFILLED SYRINGE: Performed by: INTERNAL MEDICINE

## 2021-01-01 PROCEDURE — 0 TECHNETIUM SESTAMIBI: Performed by: NURSE PRACTITIONER

## 2021-01-01 PROCEDURE — 99239 HOSP IP/OBS DSCHRG MGMT >30: CPT | Performed by: INTERNAL MEDICINE

## 2021-01-01 PROCEDURE — 99283 EMERGENCY DEPT VISIT LOW MDM: CPT

## 2021-01-01 PROCEDURE — 72141 MRI NECK SPINE W/O DYE: CPT

## 2021-01-01 PROCEDURE — 93306 TTE W/DOPPLER COMPLETE: CPT | Performed by: INTERNAL MEDICINE

## 2021-01-01 PROCEDURE — 25010000002 DEXAMETHASONE PER 1 MG: Performed by: NURSE PRACTITIONER

## 2021-01-01 PROCEDURE — 83540 ASSAY OF IRON: CPT | Performed by: INTERNAL MEDICINE

## 2021-01-01 PROCEDURE — 97110 THERAPEUTIC EXERCISES: CPT

## 2021-01-01 PROCEDURE — 99233 SBSQ HOSP IP/OBS HIGH 50: CPT | Performed by: INTERNAL MEDICINE

## 2021-01-01 PROCEDURE — 80053 COMPREHEN METABOLIC PANEL: CPT | Performed by: NURSE PRACTITIONER

## 2021-01-01 PROCEDURE — 83550 IRON BINDING TEST: CPT | Performed by: INTERNAL MEDICINE

## 2021-01-01 PROCEDURE — 87186 SC STD MICRODIL/AGAR DIL: CPT | Performed by: INTERNAL MEDICINE

## 2021-01-01 PROCEDURE — 99214 OFFICE O/P EST MOD 30 MIN: CPT | Performed by: NURSE PRACTITIONER

## 2021-01-01 PROCEDURE — 63710000001 INSULIN ASPART PER 5 UNITS: Performed by: NURSE PRACTITIONER

## 2021-01-01 PROCEDURE — 85379 FIBRIN DEGRADATION QUANT: CPT | Performed by: EMERGENCY MEDICINE

## 2021-01-01 PROCEDURE — 86900 BLOOD TYPING SEROLOGIC ABO: CPT | Performed by: INTERNAL MEDICINE

## 2021-01-01 PROCEDURE — 87077 CULTURE AEROBIC IDENTIFY: CPT | Performed by: INTERNAL MEDICINE

## 2021-01-01 PROCEDURE — 82565 ASSAY OF CREATININE: CPT | Performed by: INTERNAL MEDICINE

## 2021-01-01 PROCEDURE — 83690 ASSAY OF LIPASE: CPT | Performed by: EMERGENCY MEDICINE

## 2021-01-01 PROCEDURE — C9803 HOPD COVID-19 SPEC COLLECT: HCPCS

## 2021-01-01 PROCEDURE — 99232 SBSQ HOSP IP/OBS MODERATE 35: CPT | Performed by: HOSPITALIST

## 2021-01-01 PROCEDURE — 63710000001 INSULIN DETEMIR PER 5 UNITS: Performed by: HOSPITALIST

## 2021-01-01 PROCEDURE — 82436 ASSAY OF URINE CHLORIDE: CPT | Performed by: INTERNAL MEDICINE

## 2021-01-01 PROCEDURE — 71045 X-RAY EXAM CHEST 1 VIEW: CPT

## 2021-01-01 PROCEDURE — 80048 BASIC METABOLIC PNL TOTAL CA: CPT | Performed by: NURSE PRACTITIONER

## 2021-01-01 PROCEDURE — 99223 1ST HOSP IP/OBS HIGH 75: CPT | Performed by: NURSE PRACTITIONER

## 2021-01-01 PROCEDURE — 93005 ELECTROCARDIOGRAM TRACING: CPT | Performed by: INTERNAL MEDICINE

## 2021-01-01 PROCEDURE — 80162 ASSAY OF DIGOXIN TOTAL: CPT | Performed by: INTERNAL MEDICINE

## 2021-01-01 PROCEDURE — 85007 BL SMEAR W/DIFF WBC COUNT: CPT | Performed by: HOSPITALIST

## 2021-01-01 PROCEDURE — 71046 X-RAY EXAM CHEST 2 VIEWS: CPT

## 2021-01-01 PROCEDURE — 85025 COMPLETE CBC W/AUTO DIFF WBC: CPT | Performed by: EMERGENCY MEDICINE

## 2021-01-01 PROCEDURE — 25010000002 ONDANSETRON PER 1 MG

## 2021-01-01 PROCEDURE — 87040 BLOOD CULTURE FOR BACTERIA: CPT | Performed by: EMERGENCY MEDICINE

## 2021-01-01 PROCEDURE — 78452 HT MUSCLE IMAGE SPECT MULT: CPT

## 2021-01-01 PROCEDURE — 84132 ASSAY OF SERUM POTASSIUM: CPT | Performed by: INTERNAL MEDICINE

## 2021-01-01 PROCEDURE — 93010 ELECTROCARDIOGRAM REPORT: CPT | Performed by: INTERNAL MEDICINE

## 2021-01-01 PROCEDURE — 85025 COMPLETE CBC W/AUTO DIFF WBC: CPT | Performed by: HOSPITALIST

## 2021-01-01 PROCEDURE — 85025 COMPLETE CBC W/AUTO DIFF WBC: CPT | Performed by: NURSE PRACTITIONER

## 2021-01-01 PROCEDURE — 86850 RBC ANTIBODY SCREEN: CPT | Performed by: NURSE PRACTITIONER

## 2021-01-01 PROCEDURE — 25010000002 ENOXAPARIN PER 10 MG: Performed by: ORTHOPAEDIC SURGERY

## 2021-01-01 PROCEDURE — 74018 RADEX ABDOMEN 1 VIEW: CPT

## 2021-01-01 PROCEDURE — 86901 BLOOD TYPING SEROLOGIC RH(D): CPT | Performed by: EMERGENCY MEDICINE

## 2021-01-01 PROCEDURE — 86901 BLOOD TYPING SEROLOGIC RH(D): CPT | Performed by: NURSE PRACTITIONER

## 2021-01-01 PROCEDURE — 63710000001 ACETAMINOPHEN 325 MG TABLET: Performed by: INTERNAL MEDICINE

## 2021-01-01 PROCEDURE — 25010000002 FILGRASTIM 300 MCG/0.5ML SOLUTION PREFILLED SYRINGE: Performed by: NURSE PRACTITIONER

## 2021-01-01 PROCEDURE — 70450 CT HEAD/BRAIN W/O DYE: CPT

## 2021-01-01 PROCEDURE — 83036 HEMOGLOBIN GLYCOSYLATED A1C: CPT | Performed by: NURSE PRACTITIONER

## 2021-01-01 PROCEDURE — 82728 ASSAY OF FERRITIN: CPT | Performed by: INTERNAL MEDICINE

## 2021-01-01 PROCEDURE — XW033E5 INTRODUCTION OF REMDESIVIR ANTI-INFECTIVE INTO PERIPHERAL VEIN, PERCUTANEOUS APPROACH, NEW TECHNOLOGY GROUP 5: ICD-10-PCS | Performed by: INTERNAL MEDICINE

## 2021-01-01 PROCEDURE — 81001 URINALYSIS AUTO W/SCOPE: CPT | Performed by: NURSE PRACTITIONER

## 2021-01-01 PROCEDURE — 20610 DRAIN/INJ JOINT/BURSA W/O US: CPT | Performed by: NURSE PRACTITIONER

## 2021-01-01 PROCEDURE — 86900 BLOOD TYPING SEROLOGIC ABO: CPT | Performed by: EMERGENCY MEDICINE

## 2021-01-01 PROCEDURE — 93018 CV STRESS TEST I&R ONLY: CPT | Performed by: INTERNAL MEDICINE

## 2021-01-01 PROCEDURE — 0002A: CPT | Performed by: OBSTETRICS & GYNECOLOGY

## 2021-01-01 PROCEDURE — 84145 PROCALCITONIN (PCT): CPT | Performed by: HOSPITALIST

## 2021-01-01 PROCEDURE — 25010000002 PERFLUTREN (DEFINITY) 8.476 MG IN SODIUM CHLORIDE (PF) 0.9 % 10 ML INJECTION: Performed by: INTERNAL MEDICINE

## 2021-01-01 PROCEDURE — 25010000002 AMIODARONE IN DEXTROSE 5% 150-4.21 MG/100ML-% SOLUTION: Performed by: INTERNAL MEDICINE

## 2021-01-01 PROCEDURE — 84165 PROTEIN E-PHORESIS SERUM: CPT | Performed by: INTERNAL MEDICINE

## 2021-01-01 PROCEDURE — 99215 OFFICE O/P EST HI 40 MIN: CPT | Performed by: INTERNAL MEDICINE

## 2021-01-01 PROCEDURE — 92611 MOTION FLUOROSCOPY/SWALLOW: CPT

## 2021-01-01 PROCEDURE — 99285 EMERGENCY DEPT VISIT HI MDM: CPT | Performed by: EMERGENCY MEDICINE

## 2021-01-01 PROCEDURE — 80048 BASIC METABOLIC PNL TOTAL CA: CPT | Performed by: INTERNAL MEDICINE

## 2021-01-01 PROCEDURE — 25010000002 PROPOFOL 10 MG/ML EMULSION: Performed by: REGISTERED NURSE

## 2021-01-01 PROCEDURE — 72040 X-RAY EXAM NECK SPINE 2-3 VW: CPT | Performed by: NURSE PRACTITIONER

## 2021-01-01 PROCEDURE — 63710000001 ONDANSETRON PER 8 MG: Performed by: NURSE PRACTITIONER

## 2021-01-01 PROCEDURE — 63710000001 INSULIN DETEMIR PER 5 UNITS: Performed by: NURSE PRACTITIONER

## 2021-01-01 PROCEDURE — 93005 ELECTROCARDIOGRAM TRACING: CPT | Performed by: EMERGENCY MEDICINE

## 2021-01-01 PROCEDURE — 99232 SBSQ HOSP IP/OBS MODERATE 35: CPT | Performed by: INTERNAL MEDICINE

## 2021-01-01 PROCEDURE — 99213 OFFICE O/P EST LOW 20 MIN: CPT | Performed by: INTERNAL MEDICINE

## 2021-01-01 PROCEDURE — 81001 URINALYSIS AUTO W/SCOPE: CPT | Performed by: INTERNAL MEDICINE

## 2021-01-01 PROCEDURE — 84300 ASSAY OF URINE SODIUM: CPT | Performed by: INTERNAL MEDICINE

## 2021-01-01 PROCEDURE — 25010000002 CEFEPIME-DEXTROSE 2-5 GM-%(50ML) RECONSTITUTED SOLUTION: Performed by: NURSE PRACTITIONER

## 2021-01-01 PROCEDURE — 25010000002 ONDANSETRON PER 1 MG: Performed by: INTERNAL MEDICINE

## 2021-01-01 PROCEDURE — 63710000001 INSULIN REGULAR HUMAN PER 5 UNITS: Performed by: INTERNAL MEDICINE

## 2021-01-01 PROCEDURE — 92610 EVALUATE SWALLOWING FUNCTION: CPT

## 2021-01-01 PROCEDURE — 63710000001 INSULIN DETEMIR PER 5 UNITS: Performed by: INTERNAL MEDICINE

## 2021-01-01 PROCEDURE — 93000 ELECTROCARDIOGRAM COMPLETE: CPT | Performed by: INTERNAL MEDICINE

## 2021-01-01 PROCEDURE — 86850 RBC ANTIBODY SCREEN: CPT | Performed by: EMERGENCY MEDICINE

## 2021-01-01 PROCEDURE — 87086 URINE CULTURE/COLONY COUNT: CPT | Performed by: INTERNAL MEDICINE

## 2021-01-01 PROCEDURE — 99283 EMERGENCY DEPT VISIT LOW MDM: CPT | Performed by: EMERGENCY MEDICINE

## 2021-01-01 PROCEDURE — 83883 ASSAY NEPHELOMETRY NOT SPEC: CPT | Performed by: INTERNAL MEDICINE

## 2021-01-01 PROCEDURE — 87633 RESP VIRUS 12-25 TARGETS: CPT | Performed by: NURSE PRACTITIONER

## 2021-01-01 PROCEDURE — 84100 ASSAY OF PHOSPHORUS: CPT | Performed by: INTERNAL MEDICINE

## 2021-01-01 PROCEDURE — 91300 HC SARSCOV02 VAC 30MCG/0.3ML IM: CPT | Performed by: OBSTETRICS & GYNECOLOGY

## 2021-01-01 PROCEDURE — 11102 TANGNTL BX SKIN SINGLE LES: CPT | Performed by: INTERNAL MEDICINE

## 2021-01-01 PROCEDURE — 25010000002 DEXAMETHASONE PER 1 MG: Performed by: INTERNAL MEDICINE

## 2021-01-01 PROCEDURE — 25010000002 REGADENOSON 0.4 MG/5ML SOLUTION: Performed by: NURSE PRACTITIONER

## 2021-01-01 PROCEDURE — 80053 COMPREHEN METABOLIC PANEL: CPT | Performed by: HOSPITALIST

## 2021-01-01 PROCEDURE — 76775 US EXAM ABDO BACK WALL LIM: CPT

## 2021-01-01 PROCEDURE — C1726 CATH, BAL DIL, NON-VASCULAR: HCPCS | Performed by: INTERNAL MEDICINE

## 2021-01-01 PROCEDURE — 87635 SARS-COV-2 COVID-19 AMP PRB: CPT | Performed by: EMERGENCY MEDICINE

## 2021-01-01 PROCEDURE — 72125 CT NECK SPINE W/O DYE: CPT

## 2021-01-01 PROCEDURE — 97165 OT EVAL LOW COMPLEX 30 MIN: CPT

## 2021-01-01 PROCEDURE — 85730 THROMBOPLASTIN TIME PARTIAL: CPT | Performed by: EMERGENCY MEDICINE

## 2021-01-01 PROCEDURE — 25010000002 CALCIUM GLUCONATE PER 10 ML: Performed by: INTERNAL MEDICINE

## 2021-01-01 PROCEDURE — 87040 BLOOD CULTURE FOR BACTERIA: CPT | Performed by: NURSE PRACTITIONER

## 2021-01-01 PROCEDURE — 88305 TISSUE EXAM BY PATHOLOGIST: CPT | Performed by: INTERNAL MEDICINE

## 2021-01-01 PROCEDURE — 85610 PROTHROMBIN TIME: CPT | Performed by: EMERGENCY MEDICINE

## 2021-01-01 PROCEDURE — 83735 ASSAY OF MAGNESIUM: CPT | Performed by: EMERGENCY MEDICINE

## 2021-01-01 PROCEDURE — 99284 EMERGENCY DEPT VISIT MOD MDM: CPT | Performed by: EMERGENCY MEDICINE

## 2021-01-01 PROCEDURE — 25010000002 CEFEPIME-DEXTROSE 2-5 GM-%(50ML) RECONSTITUTED SOLUTION: Performed by: INTERNAL MEDICINE

## 2021-01-01 PROCEDURE — 83615 LACTATE (LD) (LDH) ENZYME: CPT | Performed by: NURSE PRACTITIONER

## 2021-01-01 PROCEDURE — G0463 HOSPITAL OUTPT CLINIC VISIT: HCPCS

## 2021-01-01 PROCEDURE — 85007 BL SMEAR W/DIFF WBC COUNT: CPT | Performed by: INTERNAL MEDICINE

## 2021-01-01 PROCEDURE — 78452 HT MUSCLE IMAGE SPECT MULT: CPT | Performed by: INTERNAL MEDICINE

## 2021-01-01 PROCEDURE — 93016 CV STRESS TEST SUPVJ ONLY: CPT | Performed by: INTERNAL MEDICINE

## 2021-01-01 PROCEDURE — 43249 ESOPH EGD DILATION <30 MM: CPT | Performed by: INTERNAL MEDICINE

## 2021-01-01 PROCEDURE — 97535 SELF CARE MNGMENT TRAINING: CPT

## 2021-01-01 PROCEDURE — 86900 BLOOD TYPING SEROLOGIC ABO: CPT | Performed by: NURSE PRACTITIONER

## 2021-01-01 PROCEDURE — 80053 COMPREHEN METABOLIC PANEL: CPT | Performed by: INTERNAL MEDICINE

## 2021-01-01 PROCEDURE — 87635 SARS-COV-2 COVID-19 AMP PRB: CPT | Performed by: OBSTETRICS & GYNECOLOGY

## 2021-01-01 PROCEDURE — 85007 BL SMEAR W/DIFF WBC COUNT: CPT | Performed by: EMERGENCY MEDICINE

## 2021-01-01 PROCEDURE — 99285 EMERGENCY DEPT VISIT HI MDM: CPT

## 2021-01-01 PROCEDURE — 25010000002 CALCIUM GLUCONATE PER 10 ML

## 2021-01-01 PROCEDURE — 25010000002 DIGOXIN PER 500 MCG: Performed by: INTERNAL MEDICINE

## 2021-01-01 PROCEDURE — 25010000002 LEVOFLOXACIN PER 250 MG: Performed by: EMERGENCY MEDICINE

## 2021-01-01 PROCEDURE — 25010000002 DEXAMETHASONE PER 1 MG: Performed by: EMERGENCY MEDICINE

## 2021-01-01 PROCEDURE — 86334 IMMUNOFIX E-PHORESIS SERUM: CPT | Performed by: INTERNAL MEDICINE

## 2021-01-01 PROCEDURE — 63710000001 PROMETHAZINE PER 12.5 MG: Performed by: INTERNAL MEDICINE

## 2021-01-01 PROCEDURE — 93000 ELECTROCARDIOGRAM COMPLETE: CPT | Performed by: NURSE PRACTITIONER

## 2021-01-01 PROCEDURE — 99239 HOSP IP/OBS DSCHRG MGMT >30: CPT | Performed by: NURSE PRACTITIONER

## 2021-01-01 PROCEDURE — 25010000002 AMIODARONE IN DEXTROSE 5% 360-4.14 MG/200ML-% SOLUTION: Performed by: INTERNAL MEDICINE

## 2021-01-01 PROCEDURE — 81003 URINALYSIS AUTO W/O SCOPE: CPT | Performed by: NURSE PRACTITIONER

## 2021-01-01 PROCEDURE — 96360 HYDRATION IV INFUSION INIT: CPT

## 2021-01-01 PROCEDURE — 82784 ASSAY IGA/IGD/IGG/IGM EACH: CPT | Performed by: INTERNAL MEDICINE

## 2021-01-01 PROCEDURE — 74230 X-RAY XM SWLNG FUNCJ C+: CPT

## 2021-01-01 PROCEDURE — 84443 ASSAY THYROID STIM HORMONE: CPT | Performed by: EMERGENCY MEDICINE

## 2021-01-01 PROCEDURE — 86677 HELICOBACTER PYLORI ANTIBODY: CPT

## 2021-01-01 PROCEDURE — 93017 CV STRESS TEST TRACING ONLY: CPT

## 2021-01-01 RX ORDER — PROMETHAZINE HYDROCHLORIDE 12.5 MG/1
12.5 SUPPOSITORY RECTAL EVERY 6 HOURS PRN
Status: DISCONTINUED | OUTPATIENT
Start: 2021-01-01 | End: 2021-01-01 | Stop reason: HOSPADM

## 2021-01-01 RX ORDER — MIDODRINE HYDROCHLORIDE 2.5 MG/1
2.5 TABLET ORAL 3 TIMES DAILY
Qty: 90 TABLET | Refills: 6 | Status: SHIPPED | OUTPATIENT
Start: 2021-01-01 | End: 2022-01-01 | Stop reason: HOSPADM

## 2021-01-01 RX ORDER — EPOETIN ALFA-EPBX 40000 [IU]/ML
60000 INJECTION, SOLUTION INTRAVENOUS; SUBCUTANEOUS
COMMUNITY
End: 2022-01-01 | Stop reason: HOSPADM

## 2021-01-01 RX ORDER — DIPHENHYDRAMINE HCL 25 MG
25 CAPSULE ORAL ONCE
Status: COMPLETED | OUTPATIENT
Start: 2021-01-01 | End: 2021-01-01

## 2021-01-01 RX ORDER — AMOXICILLIN 250 MG
2 CAPSULE ORAL DAILY
Status: DISCONTINUED | OUTPATIENT
Start: 2021-01-01 | End: 2021-01-01 | Stop reason: HOSPADM

## 2021-01-01 RX ORDER — DEXAMETHASONE SODIUM PHOSPHATE 4 MG/ML
6 INJECTION, SOLUTION INTRA-ARTICULAR; INTRALESIONAL; INTRAMUSCULAR; INTRAVENOUS; SOFT TISSUE EVERY 12 HOURS
Status: DISCONTINUED | OUTPATIENT
Start: 2021-01-01 | End: 2021-01-01

## 2021-01-01 RX ORDER — ACETAMINOPHEN 160 MG/5ML
650 SOLUTION ORAL EVERY 4 HOURS PRN
Status: DISCONTINUED | OUTPATIENT
Start: 2021-01-01 | End: 2021-01-01 | Stop reason: HOSPADM

## 2021-01-01 RX ORDER — CYCLOBENZAPRINE HCL 10 MG
TABLET ORAL
Qty: 45 TABLET | Refills: 0 | Status: SHIPPED | OUTPATIENT
Start: 2021-01-01 | End: 2021-01-01

## 2021-01-01 RX ORDER — POLYETHYLENE GLYCOL 3350 17 G/17G
17 POWDER, FOR SOLUTION ORAL DAILY
Start: 2021-01-01

## 2021-01-01 RX ORDER — GUAIFENESIN 600 MG/1
1200 TABLET, EXTENDED RELEASE ORAL 2 TIMES DAILY
Status: DISCONTINUED | OUTPATIENT
Start: 2021-01-01 | End: 2021-01-01 | Stop reason: HOSPADM

## 2021-01-01 RX ORDER — MIDODRINE HYDROCHLORIDE 2.5 MG/1
TABLET ORAL
Qty: 270 TABLET | Refills: 3 | Status: SHIPPED | OUTPATIENT
Start: 2021-01-01 | End: 2021-01-01 | Stop reason: SDUPTHER

## 2021-01-01 RX ORDER — EPOETIN ALFA-EPBX 40000 [IU]/ML
60000 INJECTION, SOLUTION INTRAVENOUS; SUBCUTANEOUS WEEKLY
COMMUNITY
End: 2021-01-01

## 2021-01-01 RX ORDER — FAMOTIDINE 20 MG/1
40 TABLET, FILM COATED ORAL DAILY
Status: DISCONTINUED | OUTPATIENT
Start: 2021-01-01 | End: 2021-01-01

## 2021-01-01 RX ORDER — DIPHENHYDRAMINE HCL 25 MG
25 CAPSULE ORAL ONCE
Status: CANCELLED | OUTPATIENT
Start: 2021-01-01 | End: 2021-01-01

## 2021-01-01 RX ORDER — FLUCONAZOLE 100 MG/1
100 TABLET ORAL DAILY
Qty: 21 TABLET | Refills: 0 | Status: SHIPPED | OUTPATIENT
Start: 2021-01-01 | End: 2021-01-01

## 2021-01-01 RX ORDER — SOFT LENS DISINFECTANT
1 SOLUTION, NON-ORAL MISCELLANEOUS TAKE AS DIRECTED
Qty: 1 EACH | Refills: 0 | Status: ON HOLD | OUTPATIENT
Start: 2021-01-01 | End: 2022-01-01

## 2021-01-01 RX ORDER — GABAPENTIN 100 MG/1
100 CAPSULE ORAL EVERY MORNING
Qty: 30 CAPSULE | Refills: 0 | Status: SHIPPED | OUTPATIENT
Start: 2021-01-01 | End: 2021-01-01

## 2021-01-01 RX ORDER — NYSTATIN 100000 U/G
CREAM TOPICAL 2 TIMES DAILY
Qty: 30 G | Refills: 0 | Status: SHIPPED | OUTPATIENT
Start: 2021-01-01 | End: 2021-01-01 | Stop reason: HOSPADM

## 2021-01-01 RX ORDER — CHOLECALCIFEROL (VITAMIN D3) 125 MCG
5 CAPSULE ORAL NIGHTLY PRN
Status: DISCONTINUED | OUTPATIENT
Start: 2021-01-01 | End: 2021-01-01 | Stop reason: HOSPADM

## 2021-01-01 RX ORDER — SODIUM CHLORIDE 9 MG/ML
250 INJECTION, SOLUTION INTRAVENOUS AS NEEDED
Status: CANCELLED | OUTPATIENT
Start: 2021-01-01

## 2021-01-01 RX ORDER — SODIUM CHLORIDE 9 MG/ML
INJECTION, SOLUTION INTRAVENOUS
Status: COMPLETED
Start: 2021-01-01 | End: 2021-01-01

## 2021-01-01 RX ORDER — FUROSEMIDE 10 MG/ML
20 INJECTION INTRAMUSCULAR; INTRAVENOUS ONCE
Status: COMPLETED | OUTPATIENT
Start: 2021-01-01 | End: 2021-01-01

## 2021-01-01 RX ORDER — NITROGLYCERIN 0.4 MG/1
0.4 TABLET SUBLINGUAL
Status: DISCONTINUED | OUTPATIENT
Start: 2021-01-01 | End: 2021-01-01 | Stop reason: HOSPADM

## 2021-01-01 RX ORDER — LEVOFLOXACIN 5 MG/ML
750 INJECTION, SOLUTION INTRAVENOUS ONCE
Status: COMPLETED | OUTPATIENT
Start: 2021-01-01 | End: 2021-01-01

## 2021-01-01 RX ORDER — DEXTROSE MONOHYDRATE 25 G/50ML
25 INJECTION, SOLUTION INTRAVENOUS ONCE
Status: COMPLETED | OUTPATIENT
Start: 2021-01-01 | End: 2021-01-01

## 2021-01-01 RX ORDER — SODIUM CHLORIDE 0.9 % (FLUSH) 0.9 %
10 SYRINGE (ML) INJECTION AS NEEDED
Status: DISCONTINUED | OUTPATIENT
Start: 2021-01-01 | End: 2021-01-01 | Stop reason: HOSPADM

## 2021-01-01 RX ORDER — LEVOFLOXACIN 250 MG/1
250 TABLET ORAL DAILY
COMMUNITY
Start: 2021-01-01 | End: 2021-01-01

## 2021-01-01 RX ORDER — DEXAMETHASONE SODIUM PHOSPHATE 10 MG/ML
6 INJECTION INTRAMUSCULAR; INTRAVENOUS ONCE
Status: COMPLETED | OUTPATIENT
Start: 2021-01-01 | End: 2021-01-01

## 2021-01-01 RX ORDER — EPOETIN ALFA-EPBX 40000 [IU]/ML
INJECTION, SOLUTION INTRAVENOUS; SUBCUTANEOUS
Status: ON HOLD | COMMUNITY
End: 2022-01-01

## 2021-01-01 RX ORDER — SODIUM POLYSTYRENE SULFONATE 15 G/60ML
15 SUSPENSION ORAL; RECTAL ONCE
Status: COMPLETED | OUTPATIENT
Start: 2021-01-01 | End: 2021-01-01

## 2021-01-01 RX ORDER — NICOTINE POLACRILEX 4 MG
15 LOZENGE BUCCAL
Status: DISCONTINUED | OUTPATIENT
Start: 2021-01-01 | End: 2021-01-01 | Stop reason: HOSPADM

## 2021-01-01 RX ORDER — FAMOTIDINE 20 MG/1
40 TABLET, FILM COATED ORAL 2 TIMES DAILY
Status: DISCONTINUED | OUTPATIENT
Start: 2021-01-01 | End: 2021-01-01

## 2021-01-01 RX ORDER — ALBUTEROL SULFATE 2.5 MG/3ML
2.5 SOLUTION RESPIRATORY (INHALATION) EVERY 4 HOURS PRN
Qty: 100 EACH | Refills: 2 | Status: SHIPPED | OUTPATIENT
Start: 2021-01-01 | End: 2021-01-01 | Stop reason: HOSPADM

## 2021-01-01 RX ORDER — SODIUM CHLORIDE 9 MG/ML
250 INJECTION, SOLUTION INTRAVENOUS AS NEEDED
Status: DISCONTINUED | OUTPATIENT
Start: 2021-01-01 | End: 2021-01-01 | Stop reason: HOSPADM

## 2021-01-01 RX ORDER — OXYBUTYNIN CHLORIDE 5 MG/1
5 TABLET, EXTENDED RELEASE ORAL DAILY
Refills: 5 | Status: DISCONTINUED | OUTPATIENT
Start: 2021-01-01 | End: 2021-01-01

## 2021-01-01 RX ORDER — ACETAMINOPHEN 325 MG/1
650 TABLET ORAL ONCE
Status: COMPLETED | OUTPATIENT
Start: 2021-01-01 | End: 2021-01-01

## 2021-01-01 RX ORDER — ACETAMINOPHEN 325 MG/1
650 TABLET ORAL ONCE
Status: CANCELLED | OUTPATIENT
Start: 2021-01-01 | End: 2021-01-01

## 2021-01-01 RX ORDER — LIDOCAINE HYDROCHLORIDE 10 MG/ML
0.5 INJECTION, SOLUTION EPIDURAL; INFILTRATION; INTRACAUDAL; PERINEURAL ONCE AS NEEDED
Status: DISCONTINUED | OUTPATIENT
Start: 2021-01-01 | End: 2021-01-01 | Stop reason: HOSPADM

## 2021-01-01 RX ORDER — GABAPENTIN 400 MG/1
CAPSULE ORAL
Qty: 120 CAPSULE | Refills: 2 | OUTPATIENT
Start: 2021-01-01

## 2021-01-01 RX ORDER — SODIUM CHLORIDE, SODIUM LACTATE, POTASSIUM CHLORIDE, CALCIUM CHLORIDE 600; 310; 30; 20 MG/100ML; MG/100ML; MG/100ML; MG/100ML
100 INJECTION, SOLUTION INTRAVENOUS CONTINUOUS
Status: DISCONTINUED | OUTPATIENT
Start: 2021-01-01 | End: 2021-01-01

## 2021-01-01 RX ORDER — SODIUM CHLORIDE, SODIUM LACTATE, POTASSIUM CHLORIDE, CALCIUM CHLORIDE 600; 310; 30; 20 MG/100ML; MG/100ML; MG/100ML; MG/100ML
9 INJECTION, SOLUTION INTRAVENOUS CONTINUOUS
Status: DISCONTINUED | OUTPATIENT
Start: 2021-01-01 | End: 2021-01-01 | Stop reason: HOSPADM

## 2021-01-01 RX ORDER — DESVENLAFAXINE SUCCINATE 50 MG/1
50 TABLET, EXTENDED RELEASE ORAL DAILY
Status: DISCONTINUED | OUTPATIENT
Start: 2021-01-01 | End: 2021-01-01 | Stop reason: HOSPADM

## 2021-01-01 RX ORDER — NITROFURANTOIN 25; 75 MG/1; MG/1
100 CAPSULE ORAL 2 TIMES DAILY
COMMUNITY
End: 2021-01-01 | Stop reason: HOSPADM

## 2021-01-01 RX ORDER — MOXIFLOXACIN HYDROCHLORIDE 400 MG/1
400 TABLET ORAL DAILY
Qty: 5 TABLET | Refills: 0 | Status: SHIPPED | OUTPATIENT
Start: 2021-01-01 | End: 2021-01-01

## 2021-01-01 RX ORDER — GABAPENTIN 400 MG/1
CAPSULE ORAL
Qty: 120 CAPSULE | Refills: 2 | Status: SHIPPED | OUTPATIENT
Start: 2021-01-01 | End: 2021-01-01 | Stop reason: HOSPADM

## 2021-01-01 RX ORDER — ACETAMINOPHEN 650 MG/1
650 SUPPOSITORY RECTAL EVERY 4 HOURS PRN
Status: DISCONTINUED | OUTPATIENT
Start: 2021-01-01 | End: 2021-01-01 | Stop reason: HOSPADM

## 2021-01-01 RX ORDER — COLCHICINE 0.6 MG/1
TABLET ORAL
Qty: 3 TABLET | Refills: 0 | Status: SHIPPED | OUTPATIENT
Start: 2021-01-01 | End: 2021-01-01

## 2021-01-01 RX ORDER — POLYETHYLENE GLYCOL 3350 17 G/17G
17 POWDER, FOR SOLUTION ORAL DAILY
Status: DISCONTINUED | OUTPATIENT
Start: 2021-01-01 | End: 2021-01-01 | Stop reason: HOSPADM

## 2021-01-01 RX ORDER — SODIUM CHLORIDE 9 MG/ML
125 INJECTION, SOLUTION INTRAVENOUS CONTINUOUS
Status: DISCONTINUED | OUTPATIENT
Start: 2021-01-01 | End: 2021-01-01 | Stop reason: HOSPADM

## 2021-01-01 RX ORDER — ACETAMINOPHEN 325 MG/1
650 TABLET ORAL EVERY 4 HOURS PRN
Status: DISCONTINUED | OUTPATIENT
Start: 2021-01-01 | End: 2021-01-01 | Stop reason: SDUPTHER

## 2021-01-01 RX ORDER — DEXAMETHASONE 6 MG/1
6 TABLET ORAL
Start: 2021-01-01 | End: 2021-01-01

## 2021-01-01 RX ORDER — FUROSEMIDE 10 MG/ML
20 INJECTION INTRAMUSCULAR; INTRAVENOUS ONCE
Status: CANCELLED | OUTPATIENT
Start: 2021-01-01 | End: 2021-01-01

## 2021-01-01 RX ORDER — CEFEPIME HYDROCHLORIDE 2 G/50ML
2 INJECTION, SOLUTION INTRAVENOUS EVERY 24 HOURS
Status: DISCONTINUED | OUTPATIENT
Start: 2021-01-01 | End: 2021-01-01

## 2021-01-01 RX ORDER — GABAPENTIN 100 MG/1
100 CAPSULE ORAL 3 TIMES DAILY
COMMUNITY

## 2021-01-01 RX ORDER — ATORVASTATIN CALCIUM 10 MG/1
10 TABLET, FILM COATED ORAL DAILY
Status: DISCONTINUED | OUTPATIENT
Start: 2021-01-01 | End: 2021-01-01 | Stop reason: HOSPADM

## 2021-01-01 RX ORDER — SODIUM PHOSPHATE, DIBASIC AND SODIUM PHOSPHATE, MONOBASIC 7; 19 G/133ML; G/133ML
1 ENEMA RECTAL ONCE
Status: DISCONTINUED | OUTPATIENT
Start: 2021-01-01 | End: 2021-01-01

## 2021-01-01 RX ORDER — DEXMEDETOMIDINE HYDROCHLORIDE 100 UG/ML
INJECTION, SOLUTION INTRAVENOUS AS NEEDED
Status: DISCONTINUED | OUTPATIENT
Start: 2021-01-01 | End: 2021-01-01 | Stop reason: SURG

## 2021-01-01 RX ORDER — ACETAMINOPHEN 325 MG/1
650 TABLET ORAL EVERY 6 HOURS PRN
Status: DISCONTINUED | OUTPATIENT
Start: 2021-01-01 | End: 2021-01-01 | Stop reason: HOSPADM

## 2021-01-01 RX ORDER — MIDODRINE HYDROCHLORIDE 5 MG/1
2.5 TABLET ORAL
Status: DISCONTINUED | OUTPATIENT
Start: 2021-01-01 | End: 2021-01-01 | Stop reason: HOSPADM

## 2021-01-01 RX ORDER — ALBUTEROL SULFATE 90 UG/1
2 AEROSOL, METERED RESPIRATORY (INHALATION) EVERY 4 HOURS PRN
Status: DISCONTINUED | OUTPATIENT
Start: 2021-01-01 | End: 2021-01-01 | Stop reason: HOSPADM

## 2021-01-01 RX ORDER — SODIUM CHLORIDE 9 MG/ML
75 INJECTION, SOLUTION INTRAVENOUS CONTINUOUS
Status: DISCONTINUED | OUTPATIENT
Start: 2021-01-01 | End: 2021-01-01

## 2021-01-01 RX ORDER — LEVOTHYROXINE SODIUM 88 UG/1
88 TABLET ORAL DAILY
Status: DISCONTINUED | OUTPATIENT
Start: 2021-01-01 | End: 2021-01-01 | Stop reason: HOSPADM

## 2021-01-01 RX ORDER — BISACODYL 10 MG
10 SUPPOSITORY, RECTAL RECTAL DAILY PRN
Start: 2021-01-01

## 2021-01-01 RX ORDER — HYDROCODONE BITARTRATE AND ACETAMINOPHEN 5; 325 MG/1; MG/1
TABLET ORAL
Qty: 30 TABLET | Refills: 0 | Status: SHIPPED | OUTPATIENT
Start: 2021-01-01 | End: 2021-01-01 | Stop reason: HOSPADM

## 2021-01-01 RX ORDER — DIGOXIN 0.25 MG/ML
250 INJECTION INTRAMUSCULAR; INTRAVENOUS ONCE
Status: COMPLETED | OUTPATIENT
Start: 2021-01-01 | End: 2021-01-01

## 2021-01-01 RX ORDER — SODIUM CHLORIDE 9 MG/ML
40 INJECTION, SOLUTION INTRAVENOUS AS NEEDED
Status: DISCONTINUED | OUTPATIENT
Start: 2021-01-01 | End: 2021-01-01 | Stop reason: HOSPADM

## 2021-01-01 RX ORDER — PROMETHAZINE HYDROCHLORIDE 12.5 MG/1
12.5 TABLET ORAL EVERY 6 HOURS PRN
Status: DISCONTINUED | OUTPATIENT
Start: 2021-01-01 | End: 2021-01-01 | Stop reason: HOSPADM

## 2021-01-01 RX ORDER — POLYETHYLENE GLYCOL 3350 17 G/17G
17 POWDER, FOR SOLUTION ORAL DAILY PRN
Status: DISCONTINUED | OUTPATIENT
Start: 2021-01-01 | End: 2021-01-01

## 2021-01-01 RX ORDER — LIDOCAINE HYDROCHLORIDE 10 MG/ML
4 INJECTION, SOLUTION EPIDURAL; INFILTRATION; INTRACAUDAL; PERINEURAL
Status: COMPLETED | OUTPATIENT
Start: 2021-01-01 | End: 2021-01-01

## 2021-01-01 RX ORDER — PANTOPRAZOLE SODIUM 40 MG/1
40 TABLET, DELAYED RELEASE ORAL EVERY MORNING
Refills: 3 | Status: DISCONTINUED | OUTPATIENT
Start: 2021-01-01 | End: 2021-01-01 | Stop reason: HOSPADM

## 2021-01-01 RX ORDER — BISACODYL 5 MG/1
5 TABLET, DELAYED RELEASE ORAL DAILY PRN
Start: 2021-01-01 | End: 2021-01-01

## 2021-01-01 RX ORDER — SODIUM CHLORIDE 9 MG/ML
125 INJECTION, SOLUTION INTRAVENOUS CONTINUOUS
Status: ACTIVE | OUTPATIENT
Start: 2021-01-01 | End: 2021-01-01

## 2021-01-01 RX ORDER — GABAPENTIN 100 MG/1
100 CAPSULE ORAL EVERY MORNING
Status: DISCONTINUED | OUTPATIENT
Start: 2021-01-01 | End: 2021-01-01

## 2021-01-01 RX ORDER — DESVENLAFAXINE SUCCINATE 50 MG/1
TABLET, EXTENDED RELEASE ORAL
Qty: 90 TABLET | Refills: 1 | Status: SHIPPED | OUTPATIENT
Start: 2021-01-01 | End: 2022-01-01 | Stop reason: HOSPADM

## 2021-01-01 RX ORDER — ONDANSETRON 4 MG/1
4 TABLET, FILM COATED ORAL EVERY 6 HOURS PRN
Status: DISCONTINUED | OUTPATIENT
Start: 2021-01-01 | End: 2021-01-01 | Stop reason: HOSPADM

## 2021-01-01 RX ORDER — NYSTATIN 100000 [USP'U]/G
POWDER TOPICAL EVERY 12 HOURS SCHEDULED
Status: DISCONTINUED | OUTPATIENT
Start: 2021-01-01 | End: 2021-01-01 | Stop reason: HOSPADM

## 2021-01-01 RX ORDER — DEXTROSE MONOHYDRATE 25 G/50ML
25 INJECTION, SOLUTION INTRAVENOUS
Status: DISCONTINUED | OUTPATIENT
Start: 2021-01-01 | End: 2021-01-01 | Stop reason: HOSPADM

## 2021-01-01 RX ORDER — TAMSULOSIN HYDROCHLORIDE 0.4 MG/1
0.4 CAPSULE ORAL DAILY
Status: DISCONTINUED | OUTPATIENT
Start: 2021-01-01 | End: 2021-01-01 | Stop reason: HOSPADM

## 2021-01-01 RX ORDER — CEFEPIME HYDROCHLORIDE 2 G/50ML
2 INJECTION, SOLUTION INTRAVENOUS ONCE
Status: COMPLETED | OUTPATIENT
Start: 2021-01-01 | End: 2021-01-01

## 2021-01-01 RX ORDER — FUROSEMIDE 20 MG/1
TABLET ORAL
Qty: 180 TABLET | Refills: 1 | Status: SHIPPED | OUTPATIENT
Start: 2021-01-01 | End: 2021-01-01 | Stop reason: HOSPADM

## 2021-01-01 RX ORDER — AMIODARONE HYDROCHLORIDE 200 MG/1
200 TABLET ORAL
Status: DISCONTINUED | OUTPATIENT
Start: 2021-01-01 | End: 2021-01-01 | Stop reason: HOSPADM

## 2021-01-01 RX ORDER — MECLIZINE HCL 12.5 MG/1
12.5 TABLET ORAL 2 TIMES DAILY PRN
COMMUNITY
End: 2021-01-01

## 2021-01-01 RX ORDER — AMIODARONE HYDROCHLORIDE 200 MG/1
200 TABLET ORAL ONCE
Status: DISCONTINUED | OUTPATIENT
Start: 2021-01-01 | End: 2021-01-01

## 2021-01-01 RX ORDER — METAXALONE 800 MG/1
400 TABLET ORAL 3 TIMES DAILY PRN
Status: DISCONTINUED | OUTPATIENT
Start: 2021-01-01 | End: 2021-01-01 | Stop reason: HOSPADM

## 2021-01-01 RX ORDER — ALBUTEROL SULFATE 2.5 MG/3ML
2.5 SOLUTION RESPIRATORY (INHALATION) EVERY 6 HOURS PRN
Status: DISCONTINUED | OUTPATIENT
Start: 2021-01-01 | End: 2021-01-01 | Stop reason: HOSPADM

## 2021-01-01 RX ORDER — GABAPENTIN 100 MG/1
200 CAPSULE ORAL NIGHTLY
Status: DISCONTINUED | OUTPATIENT
Start: 2021-01-01 | End: 2021-01-01 | Stop reason: HOSPADM

## 2021-01-01 RX ORDER — BENZONATATE 100 MG/1
100 CAPSULE ORAL 3 TIMES DAILY PRN
Status: ON HOLD
Start: 2021-01-01 | End: 2022-01-01

## 2021-01-01 RX ORDER — BISACODYL 10 MG
10 SUPPOSITORY, RECTAL RECTAL DAILY PRN
Status: DISCONTINUED | OUTPATIENT
Start: 2021-01-01 | End: 2021-01-01 | Stop reason: HOSPADM

## 2021-01-01 RX ORDER — ATORVASTATIN CALCIUM 10 MG/1
TABLET, FILM COATED ORAL
Qty: 90 TABLET | Refills: 1 | Status: SHIPPED | OUTPATIENT
Start: 2021-01-01 | End: 2022-01-01 | Stop reason: HOSPADM

## 2021-01-01 RX ORDER — ONDANSETRON 2 MG/ML
4 INJECTION INTRAMUSCULAR; INTRAVENOUS ONCE AS NEEDED
Status: DISCONTINUED | OUTPATIENT
Start: 2021-01-01 | End: 2021-01-01

## 2021-01-01 RX ORDER — FUROSEMIDE 20 MG/1
TABLET ORAL
Qty: 180 TABLET | Refills: 1 | Status: SHIPPED | OUTPATIENT
Start: 2021-01-01 | End: 2021-01-01

## 2021-01-01 RX ORDER — GABAPENTIN 100 MG/1
100 CAPSULE ORAL EVERY MORNING
Status: DISCONTINUED | OUTPATIENT
Start: 2021-01-01 | End: 2021-01-01 | Stop reason: HOSPADM

## 2021-01-01 RX ORDER — ONDANSETRON 2 MG/ML
INJECTION INTRAMUSCULAR; INTRAVENOUS
Status: COMPLETED
Start: 2021-01-01 | End: 2021-01-01

## 2021-01-01 RX ORDER — BISACODYL 5 MG/1
5 TABLET, DELAYED RELEASE ORAL DAILY PRN
Status: DISCONTINUED | OUTPATIENT
Start: 2021-01-01 | End: 2021-01-01 | Stop reason: HOSPADM

## 2021-01-01 RX ORDER — AMIODARONE HYDROCHLORIDE 200 MG/1
200 TABLET ORAL EVERY 8 HOURS
Status: DISCONTINUED | OUTPATIENT
Start: 2021-01-01 | End: 2021-01-01

## 2021-01-01 RX ORDER — CHOLECALCIFEROL (VITAMIN D3) 125 MCG
5 CAPSULE ORAL NIGHTLY PRN
Start: 2021-01-01

## 2021-01-01 RX ORDER — SODIUM CHLORIDE 0.9 % (FLUSH) 0.9 %
10 SYRINGE (ML) INJECTION EVERY 12 HOURS SCHEDULED
Status: DISCONTINUED | OUTPATIENT
Start: 2021-01-01 | End: 2021-01-01 | Stop reason: HOSPADM

## 2021-01-01 RX ORDER — APIXABAN 5 MG/1
TABLET, FILM COATED ORAL
Qty: 180 TABLET | Refills: 0 | Status: SHIPPED | OUTPATIENT
Start: 2021-01-01 | End: 2021-01-01 | Stop reason: SDUPTHER

## 2021-01-01 RX ORDER — TAMSULOSIN HYDROCHLORIDE 0.4 MG/1
0.4 CAPSULE ORAL DAILY
Qty: 30 CAPSULE
Start: 2021-01-01

## 2021-01-01 RX ORDER — CEFDINIR 300 MG/1
300 CAPSULE ORAL DAILY
Qty: 7 CAPSULE | Refills: 0 | Status: SHIPPED | OUTPATIENT
Start: 2021-01-01 | End: 2021-01-01

## 2021-01-01 RX ORDER — ONDANSETRON 2 MG/ML
4 INJECTION INTRAMUSCULAR; INTRAVENOUS EVERY 6 HOURS PRN
Status: DISCONTINUED | OUTPATIENT
Start: 2021-01-01 | End: 2021-01-01 | Stop reason: HOSPADM

## 2021-01-01 RX ORDER — AMIODARONE HYDROCHLORIDE 200 MG/1
200 TABLET ORAL
Qty: 30 TABLET | Refills: 0 | Status: SHIPPED | OUTPATIENT
Start: 2021-01-01 | End: 2021-01-01

## 2021-01-01 RX ORDER — INSULIN DEGLUDEC 200 U/ML
INJECTION, SOLUTION SUBCUTANEOUS
Qty: 9 ML | Refills: 11 | Status: SHIPPED | OUTPATIENT
Start: 2021-01-01 | End: 2021-01-01 | Stop reason: HOSPADM

## 2021-01-01 RX ORDER — SCOLOPAMINE TRANSDERMAL SYSTEM 1 MG/1
1 PATCH, EXTENDED RELEASE TRANSDERMAL
Status: DISCONTINUED | OUTPATIENT
Start: 2021-01-01 | End: 2021-01-01

## 2021-01-01 RX ORDER — HYDROCODONE BITARTRATE AND ACETAMINOPHEN 5; 325 MG/1; MG/1
1 TABLET ORAL EVERY 4 HOURS PRN
Status: DISCONTINUED | OUTPATIENT
Start: 2021-01-01 | End: 2021-01-01

## 2021-01-01 RX ORDER — MAGNESIUM CARB/ALUMINUM HYDROX 105-160MG
296 TABLET,CHEWABLE ORAL ONCE
Status: COMPLETED | OUTPATIENT
Start: 2021-01-01 | End: 2021-01-01

## 2021-01-01 RX ORDER — GUAIFENESIN 600 MG/1
1200 TABLET, EXTENDED RELEASE ORAL 2 TIMES DAILY
Status: DISCONTINUED | OUTPATIENT
Start: 2021-01-01 | End: 2021-01-01

## 2021-01-01 RX ORDER — CALCIUM GLUCONATE 94 MG/ML
INJECTION, SOLUTION INTRAVENOUS
Status: COMPLETED
Start: 2021-01-01 | End: 2021-01-01

## 2021-01-01 RX ORDER — ONDANSETRON 4 MG/1
4 TABLET, FILM COATED ORAL EVERY 6 HOURS PRN
Start: 2021-01-01 | End: 2021-01-01

## 2021-01-01 RX ORDER — PROPOFOL 10 MG/ML
VIAL (ML) INTRAVENOUS CONTINUOUS PRN
Status: DISCONTINUED | OUTPATIENT
Start: 2021-01-01 | End: 2021-01-01 | Stop reason: SURG

## 2021-01-01 RX ORDER — SODIUM CHLORIDE 9 MG/ML
INJECTION, SOLUTION INTRAVENOUS
Status: DISPENSED
Start: 2021-01-01 | End: 2021-01-01

## 2021-01-01 RX ORDER — CYCLOBENZAPRINE HCL 10 MG
TABLET ORAL
Qty: 45 TABLET | Refills: 0 | Status: ON HOLD | OUTPATIENT
Start: 2021-01-01 | End: 2021-01-01 | Stop reason: SDUPTHER

## 2021-01-01 RX ORDER — SODIUM CHLORIDE 9 MG/ML
125 INJECTION, SOLUTION INTRAVENOUS CONTINUOUS
Status: DISCONTINUED | OUTPATIENT
Start: 2021-01-01 | End: 2021-01-01

## 2021-01-01 RX ORDER — DEXAMETHASONE SODIUM PHOSPHATE 4 MG/ML
6 INJECTION, SOLUTION INTRA-ARTICULAR; INTRALESIONAL; INTRAMUSCULAR; INTRAVENOUS; SOFT TISSUE DAILY
Status: DISCONTINUED | OUTPATIENT
Start: 2021-01-01 | End: 2021-01-01

## 2021-01-01 RX ORDER — HYDROCODONE BITARTRATE AND ACETAMINOPHEN 5; 325 MG/1; MG/1
TABLET ORAL
Qty: 30 TABLET | Refills: 0 | Status: SHIPPED | OUTPATIENT
Start: 2021-01-01 | End: 2021-01-01

## 2021-01-01 RX ORDER — ONDANSETRON 4 MG/1
8 TABLET, FILM COATED ORAL 3 TIMES DAILY PRN
Status: DISCONTINUED | OUTPATIENT
Start: 2021-01-01 | End: 2021-01-01 | Stop reason: HOSPADM

## 2021-01-01 RX ORDER — DEXTROSE MONOHYDRATE 25 G/50ML
50 INJECTION, SOLUTION INTRAVENOUS ONCE
Status: COMPLETED | OUTPATIENT
Start: 2021-01-01 | End: 2021-01-01

## 2021-01-01 RX ORDER — METAXALONE 400 MG/1
400 TABLET ORAL 3 TIMES DAILY PRN
Start: 2021-01-01 | End: 2022-01-01 | Stop reason: HOSPADM

## 2021-01-01 RX ORDER — SODIUM BICARBONATE 650 MG/1
650 TABLET ORAL 3 TIMES DAILY
Status: DISCONTINUED | OUTPATIENT
Start: 2021-01-01 | End: 2021-01-01

## 2021-01-01 RX ORDER — FAMOTIDINE 40 MG/1
40 TABLET, FILM COATED ORAL 2 TIMES DAILY
Qty: 180 TABLET | Refills: 3 | Status: SHIPPED | OUTPATIENT
Start: 2021-01-01 | End: 2021-01-01 | Stop reason: HOSPADM

## 2021-01-01 RX ORDER — AMIODARONE HYDROCHLORIDE 200 MG/1
200 TABLET ORAL EVERY 12 HOURS SCHEDULED
Status: DISCONTINUED | OUTPATIENT
Start: 2021-01-01 | End: 2021-01-01

## 2021-01-01 RX ORDER — LORATADINE 10 MG/1
10 TABLET ORAL DAILY
COMMUNITY
End: 2021-01-01 | Stop reason: HOSPADM

## 2021-01-01 RX ORDER — NITROGLYCERIN 0.4 MG/1
0.4 TABLET SUBLINGUAL
Status: DISCONTINUED | OUTPATIENT
Start: 2021-01-01 | End: 2021-01-01

## 2021-01-01 RX ORDER — NYSTATIN 100000 U/G
1 CREAM TOPICAL AS NEEDED
Status: ON HOLD | COMMUNITY
Start: 2021-01-01 | End: 2022-01-01

## 2021-01-01 RX ORDER — CEFDINIR 300 MG/1
300 CAPSULE ORAL 2 TIMES DAILY
Qty: 20 CAPSULE | Refills: 0 | Status: SHIPPED | OUTPATIENT
Start: 2021-01-01 | End: 2021-01-01 | Stop reason: HOSPADM

## 2021-01-01 RX ORDER — NUT.TX.IMPAIRED DIGESTIVE FXN 0.035-1/ML
240 LIQUID (ML) ORAL DAILY
Qty: 7200 ML | Refills: 11 | Status: SHIPPED | OUTPATIENT
Start: 2021-01-01 | End: 2022-01-01 | Stop reason: HOSPADM

## 2021-01-01 RX ORDER — AMIODARONE HYDROCHLORIDE 200 MG/1
200 TABLET ORAL DAILY
COMMUNITY

## 2021-01-01 RX ORDER — ONDANSETRON 2 MG/ML
4 INJECTION INTRAMUSCULAR; INTRAVENOUS ONCE
Status: COMPLETED | OUTPATIENT
Start: 2021-01-01 | End: 2021-01-01

## 2021-01-01 RX ORDER — ACETAMINOPHEN 325 MG/1
650 TABLET ORAL EVERY 4 HOURS PRN
Status: DISCONTINUED | OUTPATIENT
Start: 2021-01-01 | End: 2021-01-01 | Stop reason: HOSPADM

## 2021-01-01 RX ORDER — MIDODRINE HYDROCHLORIDE 2.5 MG/1
TABLET ORAL
Qty: 30 TABLET | Refills: 0 | Status: SHIPPED | OUTPATIENT
Start: 2021-01-01 | End: 2021-01-01 | Stop reason: SDUPTHER

## 2021-01-01 RX ORDER — AMIODARONE HYDROCHLORIDE 200 MG/1
200 TABLET ORAL EVERY 12 HOURS
Status: DISCONTINUED | OUTPATIENT
Start: 2021-01-01 | End: 2021-01-01

## 2021-01-01 RX ORDER — CEFEPIME HYDROCHLORIDE 2 G/50ML
2 INJECTION, SOLUTION INTRAVENOUS EVERY 8 HOURS
Status: DISCONTINUED | OUTPATIENT
Start: 2021-01-01 | End: 2021-01-01 | Stop reason: DRUGHIGH

## 2021-01-01 RX ORDER — LOSARTAN POTASSIUM 25 MG/1
12.5 TABLET ORAL DAILY
Qty: 15 TABLET | Refills: 0 | Status: SHIPPED | OUTPATIENT
Start: 2021-01-01 | End: 2021-01-01 | Stop reason: HOSPADM

## 2021-01-01 RX ORDER — HYDROCODONE BITARTRATE AND ACETAMINOPHEN 5; 325 MG/1; MG/1
1 TABLET ORAL 2 TIMES DAILY PRN
Status: ON HOLD | COMMUNITY
End: 2022-01-01 | Stop reason: SDUPTHER

## 2021-01-01 RX ORDER — ATORVASTATIN CALCIUM 10 MG/1
10 TABLET, FILM COATED ORAL NIGHTLY
Status: DISCONTINUED | OUTPATIENT
Start: 2021-01-01 | End: 2021-01-01 | Stop reason: HOSPADM

## 2021-01-01 RX ORDER — BISACODYL 10 MG
10 SUPPOSITORY, RECTAL RECTAL DAILY PRN
Status: DISCONTINUED | OUTPATIENT
Start: 2021-01-01 | End: 2021-01-01

## 2021-01-01 RX ORDER — SODIUM CHLORIDE 450 MG/100ML
75 INJECTION, SOLUTION INTRAVENOUS CONTINUOUS
Status: DISCONTINUED | OUTPATIENT
Start: 2021-01-01 | End: 2021-01-01

## 2021-01-01 RX ORDER — METOPROLOL SUCCINATE 25 MG/1
12.5 TABLET, EXTENDED RELEASE ORAL DAILY
Qty: 15 TABLET | Refills: 0 | Status: SHIPPED | OUTPATIENT
Start: 2021-01-01 | End: 2021-01-01 | Stop reason: HOSPADM

## 2021-01-01 RX ORDER — DILTIAZEM HYDROCHLORIDE 5 MG/ML
5 INJECTION INTRAVENOUS ONCE
Status: COMPLETED | OUTPATIENT
Start: 2021-01-01 | End: 2021-01-01

## 2021-01-01 RX ORDER — HYDROCODONE BITARTRATE AND ACETAMINOPHEN 5; 325 MG/1; MG/1
1 TABLET ORAL EVERY 12 HOURS PRN
Qty: 30 TABLET | Refills: 0 | Status: SHIPPED | OUTPATIENT
Start: 2021-01-01 | End: 2021-01-01

## 2021-01-01 RX ORDER — NITROFURANTOIN 25; 75 MG/1; MG/1
100 CAPSULE ORAL DAILY
COMMUNITY
Start: 2021-01-01 | End: 2021-01-01 | Stop reason: HOSPADM

## 2021-01-01 RX ORDER — PROMETHAZINE HYDROCHLORIDE 25 MG/1
25 TABLET ORAL EVERY 6 HOURS PRN
COMMUNITY
End: 2022-01-01 | Stop reason: HOSPADM

## 2021-01-01 RX ORDER — MAGNESIUM CARB/ALUMINUM HYDROX 105-160MG
150 TABLET,CHEWABLE ORAL ONCE
Status: DISCONTINUED | OUTPATIENT
Start: 2021-01-01 | End: 2021-01-01

## 2021-01-01 RX ORDER — BISACODYL 5 MG/1
5 TABLET, DELAYED RELEASE ORAL DAILY PRN
Status: DISCONTINUED | OUTPATIENT
Start: 2021-01-01 | End: 2021-01-01

## 2021-01-01 RX ORDER — LIDOCAINE HYDROCHLORIDE 20 MG/ML
INJECTION, SOLUTION INFILTRATION; PERINEURAL AS NEEDED
Status: DISCONTINUED | OUTPATIENT
Start: 2021-01-01 | End: 2021-01-01 | Stop reason: SURG

## 2021-01-01 RX ORDER — POTASSIUM CHLORIDE 750 MG/1
TABLET, FILM COATED, EXTENDED RELEASE ORAL
Qty: 90 TABLET | Refills: 2 | Status: SHIPPED | OUTPATIENT
Start: 2021-01-01 | End: 2021-01-01 | Stop reason: HOSPADM

## 2021-01-01 RX ORDER — INSULIN ASPART 100 [IU]/ML
INJECTION, SOLUTION INTRAVENOUS; SUBCUTANEOUS
COMMUNITY
Start: 2021-01-01 | End: 2022-01-01 | Stop reason: HOSPADM

## 2021-01-01 RX ORDER — ATORVASTATIN CALCIUM 10 MG/1
TABLET, FILM COATED ORAL
Qty: 90 TABLET | Refills: 1 | OUTPATIENT
Start: 2021-01-01

## 2021-01-01 RX ORDER — FUROSEMIDE 10 MG/ML
40 INJECTION INTRAMUSCULAR; INTRAVENOUS EVERY 12 HOURS
Status: COMPLETED | OUTPATIENT
Start: 2021-01-01 | End: 2021-01-01

## 2021-01-01 RX ORDER — DEXTROSE MONOHYDRATE 25 G/50ML
50 INJECTION, SOLUTION INTRAVENOUS
Status: DISCONTINUED | OUTPATIENT
Start: 2021-01-01 | End: 2021-01-01 | Stop reason: HOSPADM

## 2021-01-01 RX ORDER — OMEPRAZOLE 40 MG/1
40 CAPSULE, DELAYED RELEASE ORAL 2 TIMES DAILY
Qty: 180 CAPSULE | Refills: 3 | Status: SHIPPED | OUTPATIENT
Start: 2021-01-01

## 2021-01-01 RX ORDER — BETAMETHASONE SODIUM PHOSPHATE AND BETAMETHASONE ACETATE 3; 3 MG/ML; MG/ML
12 INJECTION, SUSPENSION INTRA-ARTICULAR; INTRALESIONAL; INTRAMUSCULAR; SOFT TISSUE
Status: COMPLETED | OUTPATIENT
Start: 2021-01-01 | End: 2021-01-01

## 2021-01-01 RX ORDER — LEVOTHYROXINE SODIUM 88 UG/1
TABLET ORAL
Qty: 90 TABLET | Refills: 1 | Status: SHIPPED | OUTPATIENT
Start: 2021-01-01

## 2021-01-01 RX ORDER — AMIODARONE HYDROCHLORIDE 200 MG/1
200 TABLET ORAL DAILY
Status: DISCONTINUED | OUTPATIENT
Start: 2021-01-01 | End: 2021-01-01

## 2021-01-01 RX ORDER — GABAPENTIN 100 MG/1
200 CAPSULE ORAL NIGHTLY
Qty: 60 CAPSULE | Refills: 0 | Status: SHIPPED | OUTPATIENT
Start: 2021-01-01 | End: 2021-01-01

## 2021-01-01 RX ORDER — CYCLOBENZAPRINE HCL 10 MG
5 TABLET ORAL 2 TIMES DAILY PRN
Qty: 45 TABLET | Refills: 0
Start: 2021-01-01 | End: 2021-01-01 | Stop reason: HOSPADM

## 2021-01-01 RX ORDER — AZITHROMYCIN 250 MG/1
TABLET, FILM COATED ORAL
Qty: 6 TABLET | Refills: 0 | Status: SHIPPED | OUTPATIENT
Start: 2021-01-01 | End: 2021-01-01 | Stop reason: HOSPADM

## 2021-01-01 RX ORDER — MIDODRINE HYDROCHLORIDE 5 MG/1
2.5 TABLET ORAL
Status: DISCONTINUED | OUTPATIENT
Start: 2021-01-01 | End: 2021-01-01

## 2021-01-01 RX ADMIN — CEFEPIME HYDROCHLORIDE 2 G: 2 INJECTION, SOLUTION INTRAVENOUS at 05:40

## 2021-01-01 RX ADMIN — FILGRASTIM 300 MCG: 300 INJECTION, SOLUTION INTRAVENOUS; SUBCUTANEOUS at 15:45

## 2021-01-01 RX ADMIN — SODIUM CHLORIDE, PRESERVATIVE FREE 10 ML: 5 INJECTION INTRAVENOUS at 08:09

## 2021-01-01 RX ADMIN — TAMSULOSIN HYDROCHLORIDE 0.4 MG: 0.4 CAPSULE ORAL at 08:53

## 2021-01-01 RX ADMIN — GUAIFENESIN 400 MG: 200 SOLUTION ORAL at 11:56

## 2021-01-01 RX ADMIN — REMDESIVIR 200 MG: 100 INJECTION, POWDER, LYOPHILIZED, FOR SOLUTION INTRAVENOUS at 03:06

## 2021-01-01 RX ADMIN — DEXAMETHASONE SODIUM PHOSPHATE 6 MG: 4 INJECTION, SOLUTION INTRAMUSCULAR; INTRAVENOUS at 21:00

## 2021-01-01 RX ADMIN — LEVOTHYROXINE SODIUM 88 MCG: 88 TABLET ORAL at 08:08

## 2021-01-01 RX ADMIN — APIXABAN 5 MG: 2.5 TABLET, FILM COATED ORAL at 20:22

## 2021-01-01 RX ADMIN — SCOPALAMINE 1 PATCH: 1 PATCH, EXTENDED RELEASE TRANSDERMAL at 10:25

## 2021-01-01 RX ADMIN — PANTOPRAZOLE SODIUM 40 MG: 40 TABLET, DELAYED RELEASE ORAL at 08:51

## 2021-01-01 RX ADMIN — FUROSEMIDE 40 MG: 10 INJECTION, SOLUTION INTRAMUSCULAR; INTRAVENOUS at 21:19

## 2021-01-01 RX ADMIN — NYSTATIN: 100000 POWDER TOPICAL at 21:04

## 2021-01-01 RX ADMIN — SODIUM CHLORIDE, PRESERVATIVE FREE 10 ML: 5 INJECTION INTRAVENOUS at 09:42

## 2021-01-01 RX ADMIN — SODIUM CHLORIDE, PRESERVATIVE FREE 10 ML: 5 INJECTION INTRAVENOUS at 14:11

## 2021-01-01 RX ADMIN — AMIODARONE HYDROCHLORIDE 200 MG: 200 TABLET ORAL at 21:19

## 2021-01-01 RX ADMIN — MIDODRINE HYDROCHLORIDE 2.5 MG: 5 TABLET ORAL at 12:49

## 2021-01-01 RX ADMIN — FAMOTIDINE 40 MG: 20 TABLET ORAL at 08:39

## 2021-01-01 RX ADMIN — DESVENLAFAXINE SUCCINATE 50 MG: 50 TABLET, EXTENDED RELEASE ORAL at 08:52

## 2021-01-01 RX ADMIN — GUAIFENESIN 1200 MG: 600 TABLET, EXTENDED RELEASE ORAL at 22:09

## 2021-01-01 RX ADMIN — INSULIN ASPART 10 UNITS: 100 INJECTION, SOLUTION INTRAVENOUS; SUBCUTANEOUS at 09:34

## 2021-01-01 RX ADMIN — GUAIFENESIN 1200 MG: 600 TABLET, EXTENDED RELEASE ORAL at 09:41

## 2021-01-01 RX ADMIN — MIDODRINE HYDROCHLORIDE 2.5 MG: 5 TABLET ORAL at 17:17

## 2021-01-01 RX ADMIN — APIXABAN 5 MG: 2.5 TABLET, FILM COATED ORAL at 21:00

## 2021-01-01 RX ADMIN — MIDODRINE HYDROCHLORIDE 2.5 MG: 5 TABLET ORAL at 17:03

## 2021-01-01 RX ADMIN — ERYTHROPOIETIN 60000 UNITS: 40000 INJECTION, SOLUTION INTRAVENOUS; SUBCUTANEOUS at 11:22

## 2021-01-01 RX ADMIN — AMIODARONE HYDROCHLORIDE 200 MG: 200 TABLET ORAL at 10:27

## 2021-01-01 RX ADMIN — ACETAMINOPHEN 650 MG: 325 TABLET, FILM COATED ORAL at 04:47

## 2021-01-01 RX ADMIN — Medication 5 MG: at 20:22

## 2021-01-01 RX ADMIN — SODIUM BICARBONATE 650 MG TABLET 650 MG: at 16:15

## 2021-01-01 RX ADMIN — AMIODARONE HYDROCHLORIDE 200 MG: 200 TABLET ORAL at 08:08

## 2021-01-01 RX ADMIN — SODIUM CHLORIDE 40 ML: 900 INJECTION, SOLUTION INTRAVENOUS at 15:49

## 2021-01-01 RX ADMIN — INSULIN ASPART 4 UNITS: 100 INJECTION, SOLUTION INTRAVENOUS; SUBCUTANEOUS at 12:20

## 2021-01-01 RX ADMIN — AMIODARONE HYDROCHLORIDE 150 MG: 1.5 INJECTION, SOLUTION INTRAVENOUS at 10:07

## 2021-01-01 RX ADMIN — LEVOTHYROXINE SODIUM 88 MCG: 88 TABLET ORAL at 08:52

## 2021-01-01 RX ADMIN — APIXABAN 5 MG: 2.5 TABLET, FILM COATED ORAL at 21:55

## 2021-01-01 RX ADMIN — SODIUM CHLORIDE, POTASSIUM CHLORIDE, SODIUM LACTATE AND CALCIUM CHLORIDE 1000 ML: 600; 310; 30; 20 INJECTION, SOLUTION INTRAVENOUS at 06:37

## 2021-01-01 RX ADMIN — ATORVASTATIN CALCIUM 10 MG: 10 TABLET, FILM COATED ORAL at 14:05

## 2021-01-01 RX ADMIN — ONDANSETRON 4 MG: 2 INJECTION INTRAMUSCULAR; INTRAVENOUS at 19:10

## 2021-01-01 RX ADMIN — CEFEPIME HYDROCHLORIDE 2 G: 2 INJECTION, SOLUTION INTRAVENOUS at 21:25

## 2021-01-01 RX ADMIN — SODIUM CHLORIDE, PRESERVATIVE FREE 10 ML: 5 INJECTION INTRAVENOUS at 10:08

## 2021-01-01 RX ADMIN — MIDODRINE HYDROCHLORIDE 2.5 MG: 5 TABLET ORAL at 06:27

## 2021-01-01 RX ADMIN — INSULIN ASPART 3 UNITS: 100 INJECTION, SOLUTION INTRAVENOUS; SUBCUTANEOUS at 17:33

## 2021-01-01 RX ADMIN — ONDANSETRON HYDROCHLORIDE 4 MG: 4 TABLET, FILM COATED ORAL at 06:17

## 2021-01-01 RX ADMIN — SODIUM CHLORIDE 75 ML/HR: 4.5 INJECTION, SOLUTION INTRAVENOUS at 09:23

## 2021-01-01 RX ADMIN — OXYBUTYNIN CHLORIDE 5 MG: 5 TABLET, EXTENDED RELEASE ORAL at 10:26

## 2021-01-01 RX ADMIN — FILGRASTIM 300 MCG: 300 INJECTION, SOLUTION INTRAVENOUS; SUBCUTANEOUS at 09:59

## 2021-01-01 RX ADMIN — SODIUM CHLORIDE 100 ML: 9 INJECTION, SOLUTION INTRAVENOUS at 21:51

## 2021-01-01 RX ADMIN — APIXABAN 2.5 MG: 2.5 TABLET, FILM COATED ORAL at 21:03

## 2021-01-01 RX ADMIN — FILGRASTIM 300 MCG: 300 INJECTION, SOLUTION INTRAVENOUS; SUBCUTANEOUS at 10:29

## 2021-01-01 RX ADMIN — DIPHENHYDRAMINE HYDROCHLORIDE 25 MG: 25 CAPSULE ORAL at 08:48

## 2021-01-01 RX ADMIN — ONDANSETRON 4 MG: 2 INJECTION INTRAMUSCULAR; INTRAVENOUS at 08:29

## 2021-01-01 RX ADMIN — ATORVASTATIN CALCIUM 10 MG: 10 TABLET, FILM COATED ORAL at 08:51

## 2021-01-01 RX ADMIN — ACETAMINOPHEN 650 MG: 325 TABLET, FILM COATED ORAL at 10:19

## 2021-01-01 RX ADMIN — SODIUM CHLORIDE 250 ML: 9 INJECTION, SOLUTION INTRAVENOUS at 11:19

## 2021-01-01 RX ADMIN — CALCIUM GLUCONATE 1 G: 98 INJECTION, SOLUTION INTRAVENOUS at 21:51

## 2021-01-01 RX ADMIN — SENNOSIDES AND DOCUSATE SODIUM 2 TABLET: 50; 8.6 TABLET ORAL at 08:52

## 2021-01-01 RX ADMIN — MIDODRINE HYDROCHLORIDE 2.5 MG: 5 TABLET ORAL at 12:13

## 2021-01-01 RX ADMIN — SODIUM CHLORIDE, POTASSIUM CHLORIDE, SODIUM LACTATE AND CALCIUM CHLORIDE: 600; 310; 30; 20 INJECTION, SOLUTION INTRAVENOUS at 14:48

## 2021-01-01 RX ADMIN — APIXABAN 2.5 MG: 2.5 TABLET, FILM COATED ORAL at 20:46

## 2021-01-01 RX ADMIN — APIXABAN 5 MG: 2.5 TABLET, FILM COATED ORAL at 09:32

## 2021-01-01 RX ADMIN — GUAIFENESIN 400 MG: 200 SOLUTION ORAL at 17:04

## 2021-01-01 RX ADMIN — PANTOPRAZOLE SODIUM 40 MG: 40 TABLET, DELAYED RELEASE ORAL at 06:57

## 2021-01-01 RX ADMIN — LEVOTHYROXINE SODIUM 88 MCG: 88 TABLET ORAL at 14:03

## 2021-01-01 RX ADMIN — INSULIN ASPART 2 UNITS: 100 INJECTION, SOLUTION INTRAVENOUS; SUBCUTANEOUS at 17:14

## 2021-01-01 RX ADMIN — GUAIFENESIN 1200 MG: 600 TABLET, EXTENDED RELEASE ORAL at 10:26

## 2021-01-01 RX ADMIN — TECHNETIUM TC 99M SESTAMIBI 1 DOSE: 1 INJECTION INTRAVENOUS at 08:12

## 2021-01-01 RX ADMIN — MIDODRINE HYDROCHLORIDE 2.5 MG: 5 TABLET ORAL at 06:40

## 2021-01-01 RX ADMIN — SODIUM CHLORIDE 250 ML: 9 INJECTION, SOLUTION INTRAVENOUS at 12:18

## 2021-01-01 RX ADMIN — ENOXAPARIN SODIUM 30 MG: 30 INJECTION SUBCUTANEOUS at 10:21

## 2021-01-01 RX ADMIN — DEXAMETHASONE SODIUM PHOSPHATE 6 MG: 4 INJECTION, SOLUTION INTRAMUSCULAR; INTRAVENOUS at 09:30

## 2021-01-01 RX ADMIN — HUMAN INSULIN 10 UNITS: 100 INJECTION, SOLUTION SUBCUTANEOUS at 21:49

## 2021-01-01 RX ADMIN — ACETAMINOPHEN 650 MG: 325 TABLET, FILM COATED ORAL at 09:40

## 2021-01-01 RX ADMIN — GABAPENTIN 200 MG: 100 CAPSULE ORAL at 20:46

## 2021-01-01 RX ADMIN — SODIUM CHLORIDE 125 ML/HR: 9 INJECTION, SOLUTION INTRAVENOUS at 15:43

## 2021-01-01 RX ADMIN — SODIUM BICARBONATE 650 MG TABLET 650 MG: at 21:01

## 2021-01-01 RX ADMIN — AMIODARONE HYDROCHLORIDE 200 MG: 200 TABLET ORAL at 08:36

## 2021-01-01 RX ADMIN — METAXALONE 400 MG: 800 TABLET ORAL at 20:22

## 2021-01-01 RX ADMIN — GUAIFENESIN 1200 MG: 600 TABLET, EXTENDED RELEASE ORAL at 21:19

## 2021-01-01 RX ADMIN — GUAIFENESIN 1200 MG: 600 TABLET, EXTENDED RELEASE ORAL at 08:39

## 2021-01-01 RX ADMIN — SODIUM CHLORIDE, PRESERVATIVE FREE 10 ML: 5 INJECTION INTRAVENOUS at 08:36

## 2021-01-01 RX ADMIN — SENNOSIDES AND DOCUSATE SODIUM 2 TABLET: 8.6; 5 TABLET ORAL at 09:08

## 2021-01-01 RX ADMIN — FUROSEMIDE 20 MG: 10 INJECTION, SOLUTION INTRAMUSCULAR; INTRAVENOUS at 12:32

## 2021-01-01 RX ADMIN — MIDODRINE HYDROCHLORIDE 2.5 MG: 5 TABLET ORAL at 12:07

## 2021-01-01 RX ADMIN — MIDODRINE HYDROCHLORIDE 2.5 MG: 5 TABLET ORAL at 18:50

## 2021-01-01 RX ADMIN — MIDODRINE HYDROCHLORIDE 2.5 MG: 5 TABLET ORAL at 11:50

## 2021-01-01 RX ADMIN — INSULIN ASPART 3 UNITS: 100 INJECTION, SOLUTION INTRAVENOUS; SUBCUTANEOUS at 11:56

## 2021-01-01 RX ADMIN — GUAIFENESIN 400 MG: 200 SOLUTION ORAL at 09:29

## 2021-01-01 RX ADMIN — CALCIUM GLUCONATE 1 G: 98 INJECTION, SOLUTION INTRAVENOUS at 09:23

## 2021-01-01 RX ADMIN — ERYTHROPOIETIN 60000 UNITS: 40000 INJECTION, SOLUTION INTRAVENOUS; SUBCUTANEOUS at 10:35

## 2021-01-01 RX ADMIN — SODIUM CHLORIDE, PRESERVATIVE FREE 10 ML: 5 INJECTION INTRAVENOUS at 09:41

## 2021-01-01 RX ADMIN — INSULIN ASPART 2 UNITS: 100 INJECTION, SOLUTION INTRAVENOUS; SUBCUTANEOUS at 08:50

## 2021-01-01 RX ADMIN — GABAPENTIN 200 MG: 100 CAPSULE ORAL at 22:08

## 2021-01-01 RX ADMIN — INSULIN ASPART 6 UNITS: 100 INJECTION, SOLUTION INTRAVENOUS; SUBCUTANEOUS at 09:29

## 2021-01-01 RX ADMIN — MIDODRINE HYDROCHLORIDE 2.5 MG: 5 TABLET ORAL at 09:33

## 2021-01-01 RX ADMIN — INSULIN DETEMIR 40 UNITS: 100 INJECTION, SOLUTION SUBCUTANEOUS at 21:03

## 2021-01-01 RX ADMIN — SODIUM CHLORIDE 5 MG/HR: 900 INJECTION, SOLUTION INTRAVENOUS at 07:08

## 2021-01-01 RX ADMIN — GUAIFENESIN 400 MG: 200 SOLUTION ORAL at 08:08

## 2021-01-01 RX ADMIN — SODIUM POLYSTYRENE SULFONATE 15 G: 15 SUSPENSION ORAL; RECTAL at 21:48

## 2021-01-01 RX ADMIN — APIXABAN 2.5 MG: 2.5 TABLET, FILM COATED ORAL at 08:59

## 2021-01-01 RX ADMIN — ATORVASTATIN CALCIUM 10 MG: 10 TABLET, FILM COATED ORAL at 10:26

## 2021-01-01 RX ADMIN — FILGRASTIM 300 MCG: 300 INJECTION, SOLUTION INTRAVENOUS; SUBCUTANEOUS at 11:25

## 2021-01-01 RX ADMIN — ATORVASTATIN CALCIUM 10 MG: 10 TABLET, FILM COATED ORAL at 21:00

## 2021-01-01 RX ADMIN — SODIUM POLYSTYRENE SULFONATE 15 G: 15 SUSPENSION ORAL; RECTAL at 09:29

## 2021-01-01 RX ADMIN — OXYBUTYNIN CHLORIDE 5 MG: 5 TABLET, EXTENDED RELEASE ORAL at 14:10

## 2021-01-01 RX ADMIN — DEXTROSE MONOHYDRATE 25 ML: 25 INJECTION, SOLUTION INTRAVENOUS at 14:45

## 2021-01-01 RX ADMIN — NYSTATIN: 100000 POWDER TOPICAL at 20:46

## 2021-01-01 RX ADMIN — ERYTHROPOIETIN 60000 UNITS: 40000 INJECTION, SOLUTION INTRAVENOUS; SUBCUTANEOUS at 10:01

## 2021-01-01 RX ADMIN — SENNOSIDES AND DOCUSATE SODIUM 2 TABLET: 8.6; 5 TABLET ORAL at 08:40

## 2021-01-01 RX ADMIN — PANTOPRAZOLE SODIUM 40 MG: 40 TABLET, DELAYED RELEASE ORAL at 10:26

## 2021-01-01 RX ADMIN — INSULIN ASPART 2 UNITS: 100 INJECTION, SOLUTION INTRAVENOUS; SUBCUTANEOUS at 17:04

## 2021-01-01 RX ADMIN — ONDANSETRON 4 MG: 2 INJECTION INTRAMUSCULAR; INTRAVENOUS at 22:26

## 2021-01-01 RX ADMIN — AMIODARONE HYDROCHLORIDE 1 MG/MIN: 1.8 INJECTION, SOLUTION INTRAVENOUS at 10:24

## 2021-01-01 RX ADMIN — SODIUM BICARBONATE 650 MG TABLET 650 MG: at 08:52

## 2021-01-01 RX ADMIN — INSULIN ASPART 6 UNITS: 100 INJECTION, SOLUTION INTRAVENOUS; SUBCUTANEOUS at 11:56

## 2021-01-01 RX ADMIN — AMIODARONE HYDROCHLORIDE 200 MG: 200 TABLET ORAL at 08:52

## 2021-01-01 RX ADMIN — GUAIFENESIN 1200 MG: 600 TABLET, EXTENDED RELEASE ORAL at 09:09

## 2021-01-01 RX ADMIN — LEVOTHYROXINE SODIUM 88 MCG: 88 TABLET ORAL at 10:27

## 2021-01-01 RX ADMIN — SENNOSIDES AND DOCUSATE SODIUM 2 TABLET: 8.6; 5 TABLET ORAL at 08:36

## 2021-01-01 RX ADMIN — AMIODARONE HYDROCHLORIDE 200 MG: 200 TABLET ORAL at 08:40

## 2021-01-01 RX ADMIN — DESVENLAFAXINE SUCCINATE 50 MG: 50 TABLET, EXTENDED RELEASE ORAL at 09:41

## 2021-01-01 RX ADMIN — APIXABAN 2.5 MG: 2.5 TABLET, FILM COATED ORAL at 09:42

## 2021-01-01 RX ADMIN — NYSTATIN: 100000 POWDER TOPICAL at 12:53

## 2021-01-01 RX ADMIN — SODIUM CHLORIDE 250 ML: 9 INJECTION, SOLUTION INTRAVENOUS at 11:59

## 2021-01-01 RX ADMIN — FUROSEMIDE 20 MG: 10 INJECTION, SOLUTION INTRAMUSCULAR; INTRAVENOUS at 13:00

## 2021-01-01 RX ADMIN — FILGRASTIM 300 MCG: 300 INJECTION, SOLUTION INTRAVENOUS; SUBCUTANEOUS at 11:01

## 2021-01-01 RX ADMIN — INSULIN ASPART 2 UNITS: 100 INJECTION, SOLUTION INTRAVENOUS; SUBCUTANEOUS at 18:50

## 2021-01-01 RX ADMIN — ATORVASTATIN CALCIUM 10 MG: 10 TABLET, FILM COATED ORAL at 08:36

## 2021-01-01 RX ADMIN — PROPOFOL 75 MCG/KG/MIN: 10 INJECTION, EMULSION INTRAVENOUS at 14:46

## 2021-01-01 RX ADMIN — GABAPENTIN 200 MG: 100 CAPSULE ORAL at 21:03

## 2021-01-01 RX ADMIN — FILGRASTIM 300 MCG: 300 INJECTION, SOLUTION INTRAVENOUS; SUBCUTANEOUS at 11:15

## 2021-01-01 RX ADMIN — ACETAMINOPHEN 650 MG: 325 TABLET, FILM COATED ORAL at 08:48

## 2021-01-01 RX ADMIN — ONDANSETRON 4 MG: 2 INJECTION INTRAMUSCULAR; INTRAVENOUS at 00:27

## 2021-01-01 RX ADMIN — MAGNESIUM CITRATE 296 ML: 1.75 LIQUID ORAL at 13:39

## 2021-01-01 RX ADMIN — ONDANSETRON HYDROCHLORIDE 4 MG: 4 TABLET, FILM COATED ORAL at 09:50

## 2021-01-01 RX ADMIN — INSULIN DETEMIR 20 UNITS: 100 INJECTION, SOLUTION SUBCUTANEOUS at 21:31

## 2021-01-01 RX ADMIN — SODIUM CHLORIDE 250 ML: 9 INJECTION, SOLUTION INTRAVENOUS at 12:37

## 2021-01-01 RX ADMIN — DESVENLAFAXINE SUCCINATE 50 MG: 50 TABLET, EXTENDED RELEASE ORAL at 08:40

## 2021-01-01 RX ADMIN — APIXABAN 5 MG: 2.5 TABLET, FILM COATED ORAL at 09:40

## 2021-01-01 RX ADMIN — INSULIN ASPART 2 UNITS: 100 INJECTION, SOLUTION INTRAVENOUS; SUBCUTANEOUS at 17:55

## 2021-01-01 RX ADMIN — PANTOPRAZOLE SODIUM 40 MG: 40 TABLET, DELAYED RELEASE ORAL at 06:06

## 2021-01-01 RX ADMIN — ONDANSETRON HYDROCHLORIDE 4 MG: 4 TABLET, FILM COATED ORAL at 17:03

## 2021-01-01 RX ADMIN — GUAIFENESIN 400 MG: 200 SOLUTION ORAL at 20:22

## 2021-01-01 RX ADMIN — NYSTATIN: 100000 POWDER TOPICAL at 08:41

## 2021-01-01 RX ADMIN — GABAPENTIN 100 MG: 100 CAPSULE ORAL at 06:25

## 2021-01-01 RX ADMIN — LIDOCAINE HYDROCHLORIDE 4 ML: 10 INJECTION, SOLUTION EPIDURAL; INFILTRATION; INTRACAUDAL; PERINEURAL at 08:49

## 2021-01-01 RX ADMIN — ONDANSETRON 4 MG: 2 INJECTION INTRAMUSCULAR; INTRAVENOUS at 09:00

## 2021-01-01 RX ADMIN — SODIUM CHLORIDE 40 ML: 900 INJECTION, SOLUTION INTRAVENOUS at 06:06

## 2021-01-01 RX ADMIN — MIDODRINE HYDROCHLORIDE 2.5 MG: 5 TABLET ORAL at 14:15

## 2021-01-01 RX ADMIN — SENNOSIDES AND DOCUSATE SODIUM 2 TABLET: 8.6; 5 TABLET ORAL at 10:26

## 2021-01-01 RX ADMIN — POLYETHYLENE GLYCOL 3350 17 G: 17 POWDER, FOR SOLUTION ORAL at 08:08

## 2021-01-01 RX ADMIN — GABAPENTIN 200 MG: 100 CAPSULE ORAL at 21:18

## 2021-01-01 RX ADMIN — ERYTHROPOIETIN 60000 UNITS: 40000 INJECTION, SOLUTION INTRAVENOUS; SUBCUTANEOUS at 10:58

## 2021-01-01 RX ADMIN — DESVENLAFAXINE SUCCINATE 50 MG: 50 TABLET, EXTENDED RELEASE ORAL at 10:46

## 2021-01-01 RX ADMIN — SODIUM CHLORIDE 75 ML/HR: 9 INJECTION, SOLUTION INTRAVENOUS at 10:09

## 2021-01-01 RX ADMIN — NYSTATIN: 100000 POWDER TOPICAL at 09:09

## 2021-01-01 RX ADMIN — FUROSEMIDE 20 MG: 10 INJECTION, SOLUTION INTRAMUSCULAR; INTRAVENOUS at 11:58

## 2021-01-01 RX ADMIN — DIPHENHYDRAMINE HYDROCHLORIDE 25 MG: 25 CAPSULE ORAL at 09:57

## 2021-01-01 RX ADMIN — SODIUM CHLORIDE, PRESERVATIVE FREE 10 ML: 5 INJECTION INTRAVENOUS at 21:00

## 2021-01-01 RX ADMIN — METAXALONE 400 MG: 800 TABLET ORAL at 12:49

## 2021-01-01 RX ADMIN — INSULIN ASPART 4 UNITS: 100 INJECTION, SOLUTION INTRAVENOUS; SUBCUTANEOUS at 09:32

## 2021-01-01 RX ADMIN — LEVOTHYROXINE SODIUM 88 MCG: 88 TABLET ORAL at 05:27

## 2021-01-01 RX ADMIN — DILTIAZEM HYDROCHLORIDE 5 MG: 5 INJECTION INTRAVENOUS at 07:08

## 2021-01-01 RX ADMIN — ERYTHROPOIETIN 60000 UNITS: 40000 INJECTION, SOLUTION INTRAVENOUS; SUBCUTANEOUS at 15:45

## 2021-01-01 RX ADMIN — SODIUM CHLORIDE, PRESERVATIVE FREE 10 ML: 5 INJECTION INTRAVENOUS at 10:27

## 2021-01-01 RX ADMIN — DIPHENHYDRAMINE HYDROCHLORIDE 25 MG: 25 CAPSULE ORAL at 10:19

## 2021-01-01 RX ADMIN — MIDODRINE HYDROCHLORIDE 2.5 MG: 5 TABLET ORAL at 19:30

## 2021-01-01 RX ADMIN — PANTOPRAZOLE SODIUM 40 MG: 40 TABLET, DELAYED RELEASE ORAL at 06:24

## 2021-01-01 RX ADMIN — AMIODARONE HYDROCHLORIDE 200 MG: 200 TABLET ORAL at 21:03

## 2021-01-01 RX ADMIN — DEXAMETHASONE SODIUM PHOSPHATE 6 MG: 10 INJECTION INTRAMUSCULAR; INTRAVENOUS at 00:20

## 2021-01-01 RX ADMIN — BETAMETHASONE SODIUM PHOSPHATE AND BETAMETHASONE ACETATE 12 MG: 3; 3 INJECTION, SUSPENSION INTRA-ARTICULAR; INTRALESIONAL; INTRAMUSCULAR; SOFT TISSUE at 08:31

## 2021-01-01 RX ADMIN — SODIUM CHLORIDE, PRESERVATIVE FREE 10 ML: 5 INJECTION INTRAVENOUS at 21:56

## 2021-01-01 RX ADMIN — FILGRASTIM 300 MCG: 300 INJECTION, SOLUTION INTRAVENOUS; SUBCUTANEOUS at 10:06

## 2021-01-01 RX ADMIN — REMDESIVIR 200 MG: 100 INJECTION, POWDER, LYOPHILIZED, FOR SOLUTION INTRAVENOUS at 02:55

## 2021-01-01 RX ADMIN — SODIUM CHLORIDE, PRESERVATIVE FREE 10 ML: 5 INJECTION INTRAVENOUS at 21:02

## 2021-01-01 RX ADMIN — INSULIN DETEMIR 40 UNITS: 100 INJECTION, SOLUTION SUBCUTANEOUS at 20:44

## 2021-01-01 RX ADMIN — SODIUM BICARBONATE 650 MG TABLET 650 MG: at 09:50

## 2021-01-01 RX ADMIN — ERYTHROPOIETIN 60000 UNITS: 40000 INJECTION, SOLUTION INTRAVENOUS; SUBCUTANEOUS at 11:28

## 2021-01-01 RX ADMIN — LEVOTHYROXINE SODIUM 88 MCG: 88 TABLET ORAL at 09:33

## 2021-01-01 RX ADMIN — DESVENLAFAXINE SUCCINATE 50 MG: 50 TABLET, EXTENDED RELEASE ORAL at 09:33

## 2021-01-01 RX ADMIN — SODIUM CHLORIDE 125 ML/HR: 9 INJECTION, SOLUTION INTRAVENOUS at 05:57

## 2021-01-01 RX ADMIN — SODIUM CHLORIDE 40 ML: 900 INJECTION, SOLUTION INTRAVENOUS at 02:55

## 2021-01-01 RX ADMIN — DEXAMETHASONE SODIUM PHOSPHATE 6 MG: 4 INJECTION, SOLUTION INTRAMUSCULAR; INTRAVENOUS at 23:46

## 2021-01-01 RX ADMIN — SODIUM CHLORIDE, PRESERVATIVE FREE 10 ML: 5 INJECTION INTRAVENOUS at 09:10

## 2021-01-01 RX ADMIN — GABAPENTIN 100 MG: 100 CAPSULE ORAL at 10:26

## 2021-01-01 RX ADMIN — SODIUM CHLORIDE, PRESERVATIVE FREE 10 ML: 5 INJECTION INTRAVENOUS at 20:57

## 2021-01-01 RX ADMIN — PANTOPRAZOLE SODIUM 40 MG: 40 TABLET, DELAYED RELEASE ORAL at 06:40

## 2021-01-01 RX ADMIN — SODIUM CHLORIDE, PRESERVATIVE FREE 10 ML: 5 INJECTION INTRAVENOUS at 20:46

## 2021-01-01 RX ADMIN — ERYTHROPOIETIN 60000 UNITS: 40000 INJECTION, SOLUTION INTRAVENOUS; SUBCUTANEOUS at 10:05

## 2021-01-01 RX ADMIN — DEXMEDETOMIDINE 4 MCG: 100 INJECTION, SOLUTION, CONCENTRATE INTRAVENOUS at 14:54

## 2021-01-01 RX ADMIN — SODIUM CHLORIDE 250 ML: 9 INJECTION, SOLUTION INTRAVENOUS at 12:39

## 2021-01-01 RX ADMIN — ACETAMINOPHEN 650 MG: 325 TABLET, FILM COATED ORAL at 04:57

## 2021-01-01 RX ADMIN — SODIUM CHLORIDE, PRESERVATIVE FREE 10 ML: 5 INJECTION INTRAVENOUS at 09:01

## 2021-01-01 RX ADMIN — SODIUM CHLORIDE 250 ML: 9 INJECTION, SOLUTION INTRAVENOUS at 12:52

## 2021-01-01 RX ADMIN — CALCIUM GLUCONATE 1 G: 98 INJECTION, SOLUTION INTRAVENOUS at 21:54

## 2021-01-01 RX ADMIN — DESVENLAFAXINE SUCCINATE 50 MG: 50 TABLET, EXTENDED RELEASE ORAL at 10:26

## 2021-01-01 RX ADMIN — SODIUM CHLORIDE 250 ML: 9 INJECTION, SOLUTION INTRAVENOUS at 14:02

## 2021-01-01 RX ADMIN — INSULIN ASPART 6 UNITS: 100 INJECTION, SOLUTION INTRAVENOUS; SUBCUTANEOUS at 12:48

## 2021-01-01 RX ADMIN — FAMOTIDINE 40 MG: 20 TABLET ORAL at 21:19

## 2021-01-01 RX ADMIN — FAMOTIDINE 40 MG: 20 TABLET ORAL at 21:03

## 2021-01-01 RX ADMIN — NYSTATIN 1 APPLICATION: 100000 POWDER TOPICAL at 11:50

## 2021-01-01 RX ADMIN — SODIUM CHLORIDE, PRESERVATIVE FREE 10 ML: 5 INJECTION INTRAVENOUS at 09:34

## 2021-01-01 RX ADMIN — LEVOTHYROXINE SODIUM 88 MCG: 88 TABLET ORAL at 08:40

## 2021-01-01 RX ADMIN — ACETAMINOPHEN 650 MG: 325 TABLET, FILM COATED ORAL at 09:57

## 2021-01-01 RX ADMIN — PANTOPRAZOLE SODIUM 40 MG: 40 TABLET, DELAYED RELEASE ORAL at 06:25

## 2021-01-01 RX ADMIN — DEXMEDETOMIDINE 4 MCG: 100 INJECTION, SOLUTION, CONCENTRATE INTRAVENOUS at 14:47

## 2021-01-01 RX ADMIN — SODIUM CHLORIDE 250 ML: 9 INJECTION, SOLUTION INTRAVENOUS at 12:29

## 2021-01-01 RX ADMIN — LEVOFLOXACIN 750 MG: 5 INJECTION, SOLUTION INTRAVENOUS at 07:41

## 2021-01-01 RX ADMIN — DEXAMETHASONE SODIUM PHOSPHATE 6 MG: 4 INJECTION, SOLUTION INTRAMUSCULAR; INTRAVENOUS at 08:51

## 2021-01-01 RX ADMIN — FAMOTIDINE 40 MG: 20 TABLET ORAL at 14:05

## 2021-01-01 RX ADMIN — INSULIN ASPART 7 UNITS: 100 INJECTION, SOLUTION INTRAVENOUS; SUBCUTANEOUS at 12:14

## 2021-01-01 RX ADMIN — POLYETHYLENE GLYCOL 3350 17 G: 17 POWDER, FOR SOLUTION ORAL at 11:51

## 2021-01-01 RX ADMIN — INSULIN ASPART 6 UNITS: 100 INJECTION, SOLUTION INTRAVENOUS; SUBCUTANEOUS at 08:50

## 2021-01-01 RX ADMIN — SODIUM CHLORIDE 250 ML: 9 INJECTION, SOLUTION INTRAVENOUS at 12:26

## 2021-01-01 RX ADMIN — AMIODARONE HYDROCHLORIDE 200 MG: 200 TABLET ORAL at 22:08

## 2021-01-01 RX ADMIN — DESVENLAFAXINE SUCCINATE 50 MG: 50 TABLET, EXTENDED RELEASE ORAL at 08:08

## 2021-01-01 RX ADMIN — SODIUM CHLORIDE 2 ML: 9 INJECTION INTRAMUSCULAR; INTRAVENOUS; SUBCUTANEOUS at 10:23

## 2021-01-01 RX ADMIN — CEFEPIME HYDROCHLORIDE 2 G: 2 INJECTION, SOLUTION INTRAVENOUS at 06:57

## 2021-01-01 RX ADMIN — APIXABAN 2.5 MG: 2.5 TABLET, FILM COATED ORAL at 20:57

## 2021-01-01 RX ADMIN — MIDODRINE HYDROCHLORIDE 2.5 MG: 5 TABLET ORAL at 09:09

## 2021-01-01 RX ADMIN — INSULIN DETEMIR 25 UNITS: 100 INJECTION, SOLUTION SUBCUTANEOUS at 20:57

## 2021-01-01 RX ADMIN — FUROSEMIDE 20 MG: 10 INJECTION, SOLUTION INTRAMUSCULAR; INTRAVENOUS at 12:36

## 2021-01-01 RX ADMIN — DEXTROSE MONOHYDRATE 50 ML: 25 INJECTION, SOLUTION INTRAVENOUS at 09:23

## 2021-01-01 RX ADMIN — ACETAMINOPHEN 650 MG: 325 TABLET, FILM COATED ORAL at 10:11

## 2021-01-01 RX ADMIN — DIPHENHYDRAMINE HYDROCHLORIDE 25 MG: 25 CAPSULE ORAL at 09:40

## 2021-01-01 RX ADMIN — GABAPENTIN 100 MG: 100 CAPSULE ORAL at 06:12

## 2021-01-01 RX ADMIN — GABAPENTIN 100 MG: 100 CAPSULE ORAL at 09:42

## 2021-01-01 RX ADMIN — CEFEPIME HYDROCHLORIDE 2 G: 2 INJECTION, SOLUTION INTRAVENOUS at 10:08

## 2021-01-01 RX ADMIN — SENNOSIDES AND DOCUSATE SODIUM 2 TABLET: 50; 8.6 TABLET ORAL at 09:32

## 2021-01-01 RX ADMIN — TECHNETIUM TC 99M SESTAMIBI 1 DOSE: 1 INJECTION INTRAVENOUS at 09:42

## 2021-01-01 RX ADMIN — SENNOSIDES AND DOCUSATE SODIUM 2 TABLET: 50; 8.6 TABLET ORAL at 08:08

## 2021-01-01 RX ADMIN — REGADENOSON 0.4 MG: 0.08 INJECTION, SOLUTION INTRAVENOUS at 09:42

## 2021-01-01 RX ADMIN — DEXAMETHASONE SODIUM PHOSPHATE 6 MG: 4 INJECTION, SOLUTION INTRAMUSCULAR; INTRAVENOUS at 08:08

## 2021-01-01 RX ADMIN — PANTOPRAZOLE SODIUM 40 MG: 40 TABLET, DELAYED RELEASE ORAL at 14:05

## 2021-01-01 RX ADMIN — INSULIN ASPART 3 UNITS: 100 INJECTION, SOLUTION INTRAVENOUS; SUBCUTANEOUS at 12:42

## 2021-01-01 RX ADMIN — LEVOTHYROXINE SODIUM 88 MCG: 88 TABLET ORAL at 08:35

## 2021-01-01 RX ADMIN — GUAIFENESIN 1200 MG: 600 TABLET, EXTENDED RELEASE ORAL at 21:03

## 2021-01-01 RX ADMIN — APIXABAN 5 MG: 2.5 TABLET, FILM COATED ORAL at 21:03

## 2021-01-01 RX ADMIN — DEXAMETHASONE SODIUM PHOSPHATE 6 MG: 4 INJECTION, SOLUTION INTRAMUSCULAR; INTRAVENOUS at 11:19

## 2021-01-01 RX ADMIN — TAMSULOSIN HYDROCHLORIDE 0.4 MG: 0.4 CAPSULE ORAL at 09:50

## 2021-01-01 RX ADMIN — ACETAMINOPHEN 650 MG: 325 TABLET, FILM COATED ORAL at 09:19

## 2021-01-01 RX ADMIN — DIPHENHYDRAMINE HYDROCHLORIDE 25 MG: 25 CAPSULE ORAL at 08:58

## 2021-01-01 RX ADMIN — DIPHENHYDRAMINE HYDROCHLORIDE 25 MG: 25 CAPSULE ORAL at 10:38

## 2021-01-01 RX ADMIN — GABAPENTIN 200 MG: 100 CAPSULE ORAL at 20:57

## 2021-01-01 RX ADMIN — MIDODRINE HYDROCHLORIDE 2.5 MG: 5 TABLET ORAL at 17:41

## 2021-01-01 RX ADMIN — PANTOPRAZOLE SODIUM 40 MG: 40 TABLET, DELAYED RELEASE ORAL at 06:12

## 2021-01-01 RX ADMIN — ONDANSETRON 4 MG: 2 INJECTION INTRAMUSCULAR; INTRAVENOUS at 08:41

## 2021-01-01 RX ADMIN — GABAPENTIN 100 MG: 100 CAPSULE ORAL at 06:57

## 2021-01-01 RX ADMIN — ERYTHROPOIETIN 60000 UNITS: 40000 INJECTION, SOLUTION INTRAVENOUS; SUBCUTANEOUS at 10:06

## 2021-01-01 RX ADMIN — MIDODRINE HYDROCHLORIDE 2.5 MG: 5 TABLET ORAL at 17:14

## 2021-01-01 RX ADMIN — FILGRASTIM 300 MCG: 300 INJECTION, SOLUTION INTRAVENOUS; SUBCUTANEOUS at 10:09

## 2021-01-01 RX ADMIN — ATORVASTATIN CALCIUM 10 MG: 10 TABLET, FILM COATED ORAL at 09:42

## 2021-01-01 RX ADMIN — APIXABAN 2.5 MG: 2.5 TABLET, FILM COATED ORAL at 10:26

## 2021-01-01 RX ADMIN — INSULIN ASPART 6 UNITS: 100 INJECTION, SOLUTION INTRAVENOUS; SUBCUTANEOUS at 12:08

## 2021-01-01 RX ADMIN — SODIUM CHLORIDE, PRESERVATIVE FREE 10 ML: 5 INJECTION INTRAVENOUS at 22:10

## 2021-01-01 RX ADMIN — HYDROCODONE BITARTRATE AND ACETAMINOPHEN 1 TABLET: 5; 325 TABLET ORAL at 11:56

## 2021-01-01 RX ADMIN — LEVOTHYROXINE SODIUM 88 MCG: 88 TABLET ORAL at 09:09

## 2021-01-01 RX ADMIN — ERYTHROPOIETIN 60000 UNITS: 40000 INJECTION, SOLUTION INTRAVENOUS; SUBCUTANEOUS at 10:25

## 2021-01-01 RX ADMIN — FUROSEMIDE 20 MG: 10 INJECTION, SOLUTION INTRAMUSCULAR; INTRAVENOUS at 12:29

## 2021-01-01 RX ADMIN — APIXABAN 5 MG: 2.5 TABLET, FILM COATED ORAL at 08:53

## 2021-01-01 RX ADMIN — FILGRASTIM 300 MCG: 300 INJECTION, SOLUTION INTRAVENOUS; SUBCUTANEOUS at 11:27

## 2021-01-01 RX ADMIN — APIXABAN 2.5 MG: 2.5 TABLET, FILM COATED ORAL at 22:09

## 2021-01-01 RX ADMIN — FUROSEMIDE 40 MG: 10 INJECTION, SOLUTION INTRAMUSCULAR; INTRAVENOUS at 10:08

## 2021-01-01 RX ADMIN — AMIODARONE HYDROCHLORIDE 200 MG: 200 TABLET ORAL at 09:09

## 2021-01-01 RX ADMIN — GABAPENTIN 100 MG: 100 CAPSULE ORAL at 10:46

## 2021-01-01 RX ADMIN — ERYTHROPOIETIN 60000 UNITS: 40000 INJECTION, SOLUTION INTRAVENOUS; SUBCUTANEOUS at 10:16

## 2021-01-01 RX ADMIN — PANTOPRAZOLE SODIUM 40 MG: 40 TABLET, DELAYED RELEASE ORAL at 06:30

## 2021-01-01 RX ADMIN — ERYTHROPOIETIN 60000 UNITS: 40000 INJECTION, SOLUTION INTRAVENOUS; SUBCUTANEOUS at 11:01

## 2021-01-01 RX ADMIN — ERYTHROPOIETIN 60000 UNITS: 40000 INJECTION, SOLUTION INTRAVENOUS; SUBCUTANEOUS at 10:29

## 2021-01-01 RX ADMIN — ACETAMINOPHEN 650 MG: 325 TABLET, FILM COATED ORAL at 10:38

## 2021-01-01 RX ADMIN — NYSTATIN: 100000 POWDER TOPICAL at 09:03

## 2021-01-01 RX ADMIN — MIDODRINE HYDROCHLORIDE 2.5 MG: 5 TABLET ORAL at 09:39

## 2021-01-01 RX ADMIN — INSULIN ASPART 3 UNITS: 100 INJECTION, SOLUTION INTRAVENOUS; SUBCUTANEOUS at 12:53

## 2021-01-01 RX ADMIN — TAMSULOSIN HYDROCHLORIDE 0.4 MG: 0.4 CAPSULE ORAL at 08:08

## 2021-01-01 RX ADMIN — FUROSEMIDE 20 MG: 10 INJECTION, SOLUTION INTRAMUSCULAR; INTRAVENOUS at 09:31

## 2021-01-01 RX ADMIN — FILGRASTIM 300 MCG: 300 INJECTION, SOLUTION INTRAVENOUS; SUBCUTANEOUS at 10:14

## 2021-01-01 RX ADMIN — SENNOSIDES AND DOCUSATE SODIUM 2 TABLET: 8.6; 5 TABLET ORAL at 14:05

## 2021-01-01 RX ADMIN — PANTOPRAZOLE SODIUM 40 MG: 40 TABLET, DELAYED RELEASE ORAL at 09:42

## 2021-01-01 RX ADMIN — ACETAMINOPHEN 650 MG: 325 TABLET, FILM COATED ORAL at 08:59

## 2021-01-01 RX ADMIN — AMIODARONE HYDROCHLORIDE 200 MG: 200 TABLET ORAL at 09:41

## 2021-01-01 RX ADMIN — GUAIFENESIN 1200 MG: 600 TABLET, EXTENDED RELEASE ORAL at 20:57

## 2021-01-01 RX ADMIN — SODIUM CHLORIDE 125 ML/HR: 9 INJECTION, SOLUTION INTRAVENOUS at 22:20

## 2021-01-01 RX ADMIN — NYSTATIN: 100000 POWDER TOPICAL at 22:09

## 2021-01-01 RX ADMIN — FILGRASTIM 300 MCG: 300 INJECTION, SOLUTION INTRAVENOUS; SUBCUTANEOUS at 10:52

## 2021-01-01 RX ADMIN — MIDODRINE HYDROCHLORIDE 2.5 MG: 5 TABLET ORAL at 06:30

## 2021-01-01 RX ADMIN — DESVENLAFAXINE SUCCINATE 50 MG: 50 TABLET, EXTENDED RELEASE ORAL at 09:49

## 2021-01-01 RX ADMIN — SODIUM CHLORIDE, PRESERVATIVE FREE 10 ML: 5 INJECTION INTRAVENOUS at 21:32

## 2021-01-01 RX ADMIN — GUAIFENESIN 1200 MG: 600 TABLET, EXTENDED RELEASE ORAL at 20:46

## 2021-01-01 RX ADMIN — DIPHENHYDRAMINE HYDROCHLORIDE 25 MG: 25 CAPSULE ORAL at 09:19

## 2021-01-01 RX ADMIN — ATORVASTATIN CALCIUM 10 MG: 10 TABLET, FILM COATED ORAL at 08:40

## 2021-01-01 RX ADMIN — ONDANSETRON 4 MG: 2 INJECTION INTRAMUSCULAR; INTRAVENOUS at 04:47

## 2021-01-01 RX ADMIN — SODIUM CHLORIDE, PRESERVATIVE FREE 10 ML: 5 INJECTION INTRAVENOUS at 20:22

## 2021-01-01 RX ADMIN — ERYTHROPOIETIN 60000 UNITS: 40000 INJECTION, SOLUTION INTRAVENOUS; SUBCUTANEOUS at 11:15

## 2021-01-01 RX ADMIN — FUROSEMIDE 20 MG: 10 INJECTION, SOLUTION INTRAMUSCULAR; INTRAVENOUS at 11:26

## 2021-01-01 RX ADMIN — APIXABAN 5 MG: 2.5 TABLET, FILM COATED ORAL at 08:08

## 2021-01-01 RX ADMIN — GUAIFENESIN 1200 MG: 600 TABLET, EXTENDED RELEASE ORAL at 08:36

## 2021-01-01 RX ADMIN — INSULIN ASPART 3 UNITS: 100 INJECTION, SOLUTION INTRAVENOUS; SUBCUTANEOUS at 13:06

## 2021-01-01 RX ADMIN — ATORVASTATIN CALCIUM 10 MG: 10 TABLET, FILM COATED ORAL at 09:08

## 2021-01-01 RX ADMIN — LEVOTHYROXINE SODIUM 88 MCG: 88 TABLET ORAL at 09:42

## 2021-01-01 RX ADMIN — MIDODRINE HYDROCHLORIDE 2.5 MG: 5 TABLET ORAL at 21:19

## 2021-01-01 RX ADMIN — SODIUM CHLORIDE, PRESERVATIVE FREE 10 ML: 5 INJECTION INTRAVENOUS at 11:51

## 2021-01-01 RX ADMIN — DESVENLAFAXINE SUCCINATE 50 MG: 50 TABLET, EXTENDED RELEASE ORAL at 09:09

## 2021-01-01 RX ADMIN — INSULIN DETEMIR 20 UNITS: 100 INJECTION, SOLUTION SUBCUTANEOUS at 22:05

## 2021-01-01 RX ADMIN — FUROSEMIDE 20 MG: 10 INJECTION, SOLUTION INTRAVENOUS at 14:09

## 2021-01-01 RX ADMIN — DESVENLAFAXINE SUCCINATE 50 MG: 50 TABLET, EXTENDED RELEASE ORAL at 14:10

## 2021-01-01 RX ADMIN — SODIUM CHLORIDE 75 ML/HR: 4.5 INJECTION, SOLUTION INTRAVENOUS at 22:28

## 2021-01-01 RX ADMIN — DIPHENHYDRAMINE HYDROCHLORIDE 25 MG: 25 CAPSULE ORAL at 10:11

## 2021-01-01 RX ADMIN — INSULIN ASPART 7 UNITS: 100 INJECTION, SOLUTION INTRAVENOUS; SUBCUTANEOUS at 17:40

## 2021-01-01 RX ADMIN — GABAPENTIN 100 MG: 100 CAPSULE ORAL at 14:05

## 2021-01-01 RX ADMIN — DESVENLAFAXINE SUCCINATE 50 MG: 50 TABLET, EXTENDED RELEASE ORAL at 08:36

## 2021-01-01 RX ADMIN — MIDODRINE HYDROCHLORIDE 2.5 MG: 5 TABLET ORAL at 10:27

## 2021-01-01 RX ADMIN — NYSTATIN: 100000 POWDER TOPICAL at 20:57

## 2021-01-01 RX ADMIN — LIDOCAINE HYDROCHLORIDE 50 MG: 20 INJECTION, SOLUTION INFILTRATION; PERINEURAL at 14:46

## 2021-01-01 RX ADMIN — DIGOXIN 250 MCG: 0.25 INJECTION INTRAMUSCULAR; INTRAVENOUS at 10:08

## 2021-01-01 RX ADMIN — MIDODRINE HYDROCHLORIDE 2.5 MG: 5 TABLET ORAL at 06:57

## 2021-01-01 RX ADMIN — APIXABAN 2.5 MG: 2.5 TABLET, FILM COATED ORAL at 08:39

## 2021-01-01 RX ADMIN — BETAMETHASONE SODIUM PHOSPHATE AND BETAMETHASONE ACETATE 12 MG: 3; 3 INJECTION, SUSPENSION INTRA-ARTICULAR; INTRALESIONAL; INTRAMUSCULAR; SOFT TISSUE at 08:49

## 2021-01-01 RX ADMIN — SENNOSIDES AND DOCUSATE SODIUM 2 TABLET: 50; 8.6 TABLET ORAL at 09:39

## 2021-01-01 RX ADMIN — SODIUM CHLORIDE 75 ML/HR: 4.5 INJECTION, SOLUTION INTRAVENOUS at 11:51

## 2021-01-01 RX ADMIN — SENNOSIDES AND DOCUSATE SODIUM 2 TABLET: 8.6; 5 TABLET ORAL at 09:41

## 2021-01-01 RX ADMIN — PROMETHAZINE HYDROCHLORIDE 12.5 MG: 12.5 TABLET ORAL at 11:52

## 2021-01-01 RX ADMIN — AMIODARONE HYDROCHLORIDE 200 MG: 200 TABLET ORAL at 09:39

## 2021-01-01 RX ADMIN — AMIODARONE HYDROCHLORIDE 200 MG: 200 TABLET ORAL at 09:33

## 2021-01-01 RX ADMIN — ATORVASTATIN CALCIUM 10 MG: 10 TABLET, FILM COATED ORAL at 21:55

## 2021-01-01 RX ADMIN — ACETAMINOPHEN 650 MG: 325 TABLET, FILM COATED ORAL at 09:08

## 2021-01-01 RX ADMIN — INSULIN ASPART 6 UNITS: 100 INJECTION, SOLUTION INTRAVENOUS; SUBCUTANEOUS at 08:08

## 2021-01-01 RX ADMIN — APIXABAN 2.5 MG: 2.5 TABLET, FILM COATED ORAL at 08:36

## 2021-01-01 RX ADMIN — APIXABAN 5 MG: 2.5 TABLET, FILM COATED ORAL at 09:09

## 2021-01-01 RX ADMIN — FILGRASTIM 300 MCG: 300 INJECTION, SOLUTION INTRAVENOUS; SUBCUTANEOUS at 10:35

## 2021-01-01 RX ADMIN — ERYTHROPOIETIN 60000 UNITS: 40000 INJECTION, SOLUTION INTRAVENOUS; SUBCUTANEOUS at 10:52

## 2021-01-01 RX ADMIN — MIDODRINE HYDROCHLORIDE 2.5 MG: 5 TABLET ORAL at 11:56

## 2021-01-01 RX ADMIN — ATORVASTATIN CALCIUM 10 MG: 10 TABLET, FILM COATED ORAL at 20:22

## 2021-01-01 RX ADMIN — MIDODRINE HYDROCHLORIDE 2.5 MG: 5 TABLET ORAL at 06:25

## 2021-01-01 RX ADMIN — SODIUM CHLORIDE, PRESERVATIVE FREE 10 ML: 5 INJECTION INTRAVENOUS at 21:03

## 2021-01-01 RX ADMIN — FILGRASTIM 300 MCG: 300 INJECTION, SOLUTION INTRAVENOUS; SUBCUTANEOUS at 11:00

## 2021-01-01 RX ADMIN — LIDOCAINE HYDROCHLORIDE 4 ML: 10 INJECTION, SOLUTION EPIDURAL; INFILTRATION; INTRACAUDAL; PERINEURAL at 08:31

## 2021-01-01 RX ADMIN — FILGRASTIM 300 MCG: 300 INJECTION, SOLUTION INTRAVENOUS; SUBCUTANEOUS at 10:25

## 2021-01-05 ENCOUNTER — TELEPHONE (OUTPATIENT)
Dept: INTERNAL MEDICINE | Facility: CLINIC | Age: 81
End: 2021-01-05

## 2021-01-05 DIAGNOSIS — I10 ESSENTIAL HYPERTENSION: ICD-10-CM

## 2021-01-05 DIAGNOSIS — M25.511 CHRONIC RIGHT SHOULDER PAIN: ICD-10-CM

## 2021-01-05 DIAGNOSIS — E53.8 VITAMIN B 12 DEFICIENCY: ICD-10-CM

## 2021-01-05 DIAGNOSIS — E78.2 MIXED HYPERLIPIDEMIA: ICD-10-CM

## 2021-01-05 DIAGNOSIS — E11.65 UNCONTROLLED TYPE 2 DIABETES MELLITUS WITH HYPERGLYCEMIA (HCC): ICD-10-CM

## 2021-01-05 DIAGNOSIS — E11.40 TYPE 2 DIABETES MELLITUS WITH DIABETIC NEUROPATHY, WITH LONG-TERM CURRENT USE OF INSULIN (HCC): Primary | ICD-10-CM

## 2021-01-05 DIAGNOSIS — E03.9 ACQUIRED HYPOTHYROIDISM: ICD-10-CM

## 2021-01-05 DIAGNOSIS — Z79.4 TYPE 2 DIABETES MELLITUS WITH DIABETIC NEUROPATHY, WITH LONG-TERM CURRENT USE OF INSULIN (HCC): Primary | ICD-10-CM

## 2021-01-05 DIAGNOSIS — E55.9 VITAMIN D DEFICIENCY: ICD-10-CM

## 2021-01-05 DIAGNOSIS — G89.29 CHRONIC RIGHT SHOULDER PAIN: ICD-10-CM

## 2021-01-05 RX ORDER — HYDROCODONE BITARTRATE AND ACETAMINOPHEN 5; 325 MG/1; MG/1
TABLET ORAL
Qty: 30 TABLET | Refills: 0 | OUTPATIENT
Start: 2021-01-05

## 2021-01-05 RX ORDER — CYCLOBENZAPRINE HCL 10 MG
TABLET ORAL
Qty: 30 TABLET | Refills: 0 | Status: SHIPPED | OUTPATIENT
Start: 2021-01-05 | End: 2021-01-22

## 2021-01-05 NOTE — TELEPHONE ENCOUNTER
ONEDA WITH [facility] CALLED AND NEEDS AN ORDER FOR NURSING NEXT WEEK TO CONTINUES HOME HEALTH. PLEASE CALL [phone]

## 2021-01-06 ENCOUNTER — INFUSION (OUTPATIENT)
Dept: ONCOLOGY | Facility: HOSPITAL | Age: 81
End: 2021-01-06

## 2021-01-06 ENCOUNTER — LAB (OUTPATIENT)
Dept: LAB | Facility: HOSPITAL | Age: 81
End: 2021-01-06

## 2021-01-06 DIAGNOSIS — D64.9 ANEMIA REQUIRING TRANSFUSIONS: ICD-10-CM

## 2021-01-06 DIAGNOSIS — D46.C MYELODYSPLASTIC SYNDROME WITH 5Q DELETION (HCC): ICD-10-CM

## 2021-01-06 LAB
BASOPHILS # BLD AUTO: 0.04 10*3/MM3 (ref 0–0.2)
BASOPHILS NFR BLD AUTO: 1.1 % (ref 0–1.5)
DEPRECATED RDW RBC AUTO: 79.6 FL (ref 37–54)
EOSINOPHIL # BLD AUTO: 0.27 10*3/MM3 (ref 0–0.4)
EOSINOPHIL NFR BLD AUTO: 7.4 % (ref 0.3–6.2)
ERYTHROCYTE [DISTWIDTH] IN BLOOD BY AUTOMATED COUNT: 21.9 % (ref 12.3–15.4)
HCT VFR BLD AUTO: 32 % (ref 34–46.6)
HGB BLD-MCNC: 10.1 G/DL (ref 12–15.9)
IMM GRANULOCYTES # BLD AUTO: 0.01 10*3/MM3 (ref 0–0.05)
IMM GRANULOCYTES NFR BLD AUTO: 0.3 % (ref 0–0.5)
LYMPHOCYTES # BLD AUTO: 1.7 10*3/MM3 (ref 0.7–3.1)
LYMPHOCYTES NFR BLD AUTO: 46.4 % (ref 19.6–45.3)
MCH RBC QN AUTO: 31.7 PG (ref 26.6–33)
MCHC RBC AUTO-ENTMCNC: 31.6 G/DL (ref 31.5–35.7)
MCV RBC AUTO: 100.3 FL (ref 79–97)
MONOCYTES # BLD AUTO: 0.16 10*3/MM3 (ref 0.1–0.9)
MONOCYTES NFR BLD AUTO: 4.4 % (ref 5–12)
NEUTROPHILS NFR BLD AUTO: 1.48 10*3/MM3 (ref 1.7–7)
NEUTROPHILS NFR BLD AUTO: 40.4 % (ref 42.7–76)
NRBC BLD AUTO-RTO: 0 /100 WBC (ref 0–0.2)
PLATELET # BLD AUTO: 253 10*3/MM3 (ref 140–450)
PMV BLD AUTO: 11.9 FL (ref 6–12)
RBC # BLD AUTO: 3.19 10*6/MM3 (ref 3.77–5.28)
WBC # BLD AUTO: 3.66 10*3/MM3 (ref 3.4–10.8)

## 2021-01-06 PROCEDURE — 36415 COLL VENOUS BLD VENIPUNCTURE: CPT

## 2021-01-06 PROCEDURE — G0463 HOSPITAL OUTPT CLINIC VISIT: HCPCS

## 2021-01-06 PROCEDURE — 85025 COMPLETE CBC W/AUTO DIFF WBC: CPT | Performed by: INTERNAL MEDICINE

## 2021-01-06 NOTE — PROGRESS NOTES
Pt here for CBC review and possible procrit/neupogen. Hgb 10.1 today so no injection needed. Pt states she is doing well today. No new concerns. No questions. The rest of her labs are stable. She will return in 1 week for repeat labs.     Lab Results   Component Value Date    WBC 3.66 01/06/2021    HGB 10.1 (L) 01/06/2021    HCT 32.0 (L) 01/06/2021    .3 (H) 01/06/2021     01/06/2021

## 2021-01-13 ENCOUNTER — LAB (OUTPATIENT)
Dept: LAB | Facility: HOSPITAL | Age: 81
End: 2021-01-13

## 2021-01-13 ENCOUNTER — HOSPITAL ENCOUNTER (OUTPATIENT)
Dept: ULTRASOUND IMAGING | Facility: HOSPITAL | Age: 81
Discharge: HOME | End: 2021-01-13

## 2021-01-13 ENCOUNTER — OFFICE VISIT (OUTPATIENT)
Dept: INTERNAL MEDICINE | Facility: CLINIC | Age: 81
End: 2021-01-13

## 2021-01-13 ENCOUNTER — INFUSION (OUTPATIENT)
Dept: ONCOLOGY | Facility: HOSPITAL | Age: 81
End: 2021-01-13

## 2021-01-13 VITALS
TEMPERATURE: 97.6 F | HEART RATE: 76 BPM | HEIGHT: 62 IN | OXYGEN SATURATION: 92 % | SYSTOLIC BLOOD PRESSURE: 122 MMHG | WEIGHT: 197 LBS | DIASTOLIC BLOOD PRESSURE: 72 MMHG | RESPIRATION RATE: 16 BRPM | BODY MASS INDEX: 36.25 KG/M2

## 2021-01-13 DIAGNOSIS — D46.C MYELODYSPLASTIC SYNDROME WITH 5Q DELETION (HCC): ICD-10-CM

## 2021-01-13 DIAGNOSIS — E78.2 MIXED HYPERLIPIDEMIA: ICD-10-CM

## 2021-01-13 DIAGNOSIS — G89.29 CHRONIC RIGHT SHOULDER PAIN: ICD-10-CM

## 2021-01-13 DIAGNOSIS — E11.22 CKD STAGE 3 DUE TO TYPE 2 DIABETES MELLITUS (HCC): ICD-10-CM

## 2021-01-13 DIAGNOSIS — Z79.4 TYPE 2 DIABETES MELLITUS WITH DIABETIC NEUROPATHY, WITH LONG-TERM CURRENT USE OF INSULIN (HCC): ICD-10-CM

## 2021-01-13 DIAGNOSIS — D64.9 ANEMIA REQUIRING TRANSFUSIONS: ICD-10-CM

## 2021-01-13 DIAGNOSIS — R21 RASH: ICD-10-CM

## 2021-01-13 DIAGNOSIS — I50.32 CHRONIC DIASTOLIC CHF (CONGESTIVE HEART FAILURE) (HCC): ICD-10-CM

## 2021-01-13 DIAGNOSIS — M79.604 PAIN AND SWELLING OF RIGHT LOWER EXTREMITY: ICD-10-CM

## 2021-01-13 DIAGNOSIS — E11.40 TYPE 2 DIABETES MELLITUS WITH DIABETIC NEUROPATHY, WITH LONG-TERM CURRENT USE OF INSULIN (HCC): ICD-10-CM

## 2021-01-13 DIAGNOSIS — M79.89 PAIN AND SWELLING OF RIGHT LOWER EXTREMITY: ICD-10-CM

## 2021-01-13 DIAGNOSIS — N18.30 CKD STAGE 3 DUE TO TYPE 2 DIABETES MELLITUS (HCC): ICD-10-CM

## 2021-01-13 DIAGNOSIS — D47.2 MONOCLONAL GAMMOPATHY OF UNKNOWN SIGNIFICANCE (MGUS): ICD-10-CM

## 2021-01-13 DIAGNOSIS — M25.511 CHRONIC RIGHT SHOULDER PAIN: ICD-10-CM

## 2021-01-13 DIAGNOSIS — I10 ESSENTIAL HYPERTENSION: ICD-10-CM

## 2021-01-13 DIAGNOSIS — E03.9 ACQUIRED HYPOTHYROIDISM: ICD-10-CM

## 2021-01-13 DIAGNOSIS — M79.661 RIGHT CALF PAIN: Primary | ICD-10-CM

## 2021-01-13 LAB
25(OH)D3+25(OH)D2 SERPL-MCNC: 30.1 NG/ML (ref 30–100)
ALBUMIN SERPL-MCNC: 4.1 G/DL (ref 3.7–4.7)
ALBUMIN/CREAT UR: 268 MG/G CREAT (ref 0–29)
ALBUMIN/GLOB SERPL: 1.6 {RATIO} (ref 1.2–2.2)
ALP SERPL-CCNC: 122 IU/L (ref 39–117)
ALT SERPL-CCNC: 10 IU/L (ref 0–32)
AST SERPL-CCNC: 10 IU/L (ref 0–40)
BASOPHILS # BLD AUTO: 0.07 10*3/MM3 (ref 0–0.2)
BASOPHILS # BLD AUTO: 0.1 X10E3/UL (ref 0–0.2)
BASOPHILS NFR BLD AUTO: 1 %
BASOPHILS NFR BLD AUTO: 1.1 % (ref 0–1.5)
BILIRUB SERPL-MCNC: 0.4 MG/DL (ref 0–1.2)
BUN SERPL-MCNC: 29 MG/DL (ref 8–27)
BUN/CREAT SERPL: 13 (ref 12–28)
CALCIUM SERPL-MCNC: 9.1 MG/DL (ref 8.7–10.3)
CHLORIDE SERPL-SCNC: 104 MMOL/L (ref 96–106)
CHOLEST SERPL-MCNC: 91 MG/DL (ref 100–199)
CO2 SERPL-SCNC: 26 MMOL/L (ref 20–29)
CREAT SERPL-MCNC: 2.23 MG/DL (ref 0.57–1)
CREAT UR-MCNC: 91.1 MG/DL
DEPRECATED RDW RBC AUTO: 79.7 FL (ref 37–54)
EOSINOPHIL # BLD AUTO: 0.2 X10E3/UL (ref 0–0.4)
EOSINOPHIL # BLD AUTO: 0.31 10*3/MM3 (ref 0–0.4)
EOSINOPHIL NFR BLD AUTO: 4 %
EOSINOPHIL NFR BLD AUTO: 4.8 % (ref 0.3–6.2)
ERYTHROCYTE [DISTWIDTH] IN BLOOD BY AUTOMATED COUNT: 21.8 % (ref 12.3–15.4)
ERYTHROCYTE [DISTWIDTH] IN BLOOD BY AUTOMATED COUNT: 22 % (ref 11.7–15.4)
GLOBULIN SER CALC-MCNC: 2.5 G/DL (ref 1.5–4.5)
GLUCOSE SERPL-MCNC: 62 MG/DL (ref 65–99)
HBA1C MFR BLD: 6.1 % (ref 4.8–5.6)
HCT VFR BLD AUTO: 30.7 % (ref 34–46.6)
HCT VFR BLD AUTO: 33.7 % (ref 34–46.6)
HDLC SERPL-MCNC: 42 MG/DL
HGB BLD-MCNC: 10.6 G/DL (ref 12–15.9)
HGB BLD-MCNC: 9.9 G/DL (ref 11.1–15.9)
IMM GRANULOCYTES # BLD AUTO: 0.08 10*3/MM3 (ref 0–0.05)
IMM GRANULOCYTES # BLD AUTO: 0.1 X10E3/UL (ref 0–0.1)
IMM GRANULOCYTES NFR BLD AUTO: 1 %
IMM GRANULOCYTES NFR BLD AUTO: 1.2 % (ref 0–0.5)
LDLC SERPL CALC-MCNC: 36 MG/DL (ref 0–99)
LDLC/HDLC SERPL: 0.9 RATIO (ref 0–3.2)
LYMPHOCYTES # BLD AUTO: 1.5 X10E3/UL (ref 0.7–3.1)
LYMPHOCYTES # BLD AUTO: 2 10*3/MM3 (ref 0.7–3.1)
LYMPHOCYTES NFR BLD AUTO: 26 %
LYMPHOCYTES NFR BLD AUTO: 30.8 % (ref 19.6–45.3)
MCH RBC QN AUTO: 31.3 PG (ref 26.6–33)
MCH RBC QN AUTO: 31.5 PG (ref 26.6–33)
MCHC RBC AUTO-ENTMCNC: 31.5 G/DL (ref 31.5–35.7)
MCHC RBC AUTO-ENTMCNC: 32.2 G/DL (ref 31.5–35.7)
MCV RBC AUTO: 98 FL (ref 79–97)
MCV RBC AUTO: 99.4 FL (ref 79–97)
MICROALBUMIN UR-MCNC: 244.5 UG/ML
MONOCYTES # BLD AUTO: 0.2 X10E3/UL (ref 0.1–0.9)
MONOCYTES # BLD AUTO: 0.32 10*3/MM3 (ref 0.1–0.9)
MONOCYTES NFR BLD AUTO: 4 %
MONOCYTES NFR BLD AUTO: 4.9 % (ref 5–12)
NEUTROPHILS # BLD AUTO: 3.5 X10E3/UL (ref 1.4–7)
NEUTROPHILS NFR BLD AUTO: 3.72 10*3/MM3 (ref 1.7–7)
NEUTROPHILS NFR BLD AUTO: 57.2 % (ref 42.7–76)
NEUTROPHILS NFR BLD AUTO: 64 %
NRBC BLD AUTO-RTO: 0 /100 WBC (ref 0–0.2)
PLATELET # BLD AUTO: 286 10*3/MM3 (ref 140–450)
PLATELET # BLD AUTO: 294 X10E3/UL (ref 150–450)
PMV BLD AUTO: 11.7 FL (ref 6–12)
POTASSIUM SERPL-SCNC: 4.7 MMOL/L (ref 3.5–5.2)
PROT SERPL-MCNC: 6.6 G/DL (ref 6–8.5)
RBC # BLD AUTO: 3.14 X10E6/UL (ref 3.77–5.28)
RBC # BLD AUTO: 3.39 10*6/MM3 (ref 3.77–5.28)
SODIUM SERPL-SCNC: 144 MMOL/L (ref 134–144)
T4 FREE SERPL-MCNC: 1.34 NG/DL (ref 0.82–1.77)
TRIGL SERPL-MCNC: 57 MG/DL (ref 0–149)
TSH SERPL DL<=0.005 MIU/L-ACNC: 4.27 UIU/ML (ref 0.45–4.5)
VIT B12 SERPL-MCNC: 388 PG/ML (ref 232–1245)
VLDLC SERPL CALC-MCNC: 13 MG/DL (ref 5–40)
WBC # BLD AUTO: 5.5 X10E3/UL (ref 3.4–10.8)
WBC # BLD AUTO: 6.5 10*3/MM3 (ref 3.4–10.8)

## 2021-01-13 PROCEDURE — 99215 OFFICE O/P EST HI 40 MIN: CPT | Performed by: INTERNAL MEDICINE

## 2021-01-13 PROCEDURE — 85025 COMPLETE CBC W/AUTO DIFF WBC: CPT | Performed by: NURSE PRACTITIONER

## 2021-01-13 PROCEDURE — 93971 EXTREMITY STUDY: CPT

## 2021-01-13 PROCEDURE — 36415 COLL VENOUS BLD VENIPUNCTURE: CPT | Performed by: INTERNAL MEDICINE

## 2021-01-13 RX ORDER — HYDROCODONE BITARTRATE AND ACETAMINOPHEN 5; 325 MG/1; MG/1
1 TABLET ORAL EVERY 12 HOURS PRN
Qty: 30 TABLET | Refills: 0 | Status: SHIPPED | OUTPATIENT
Start: 2021-01-13 | End: 2021-01-01 | Stop reason: SDUPTHER

## 2021-01-13 RX ORDER — INSULIN DEGLUDEC 200 U/ML
50 INJECTION, SOLUTION SUBCUTANEOUS NIGHTLY
Qty: 9 ML | Refills: 11
Start: 2021-01-13 | End: 2021-01-01

## 2021-01-13 RX ORDER — NYSTATIN 100000 [USP'U]/G
POWDER TOPICAL 2 TIMES DAILY
Qty: 60 G | Refills: 2 | Status: SHIPPED | OUTPATIENT
Start: 2021-01-13 | End: 2021-01-01

## 2021-01-13 RX ORDER — CEPHALEXIN 500 MG/1
500 CAPSULE ORAL 3 TIMES DAILY
Qty: 30 CAPSULE | Refills: 0 | Status: SHIPPED | OUTPATIENT
Start: 2021-01-13 | End: 2021-01-23

## 2021-01-13 NOTE — PROGRESS NOTES
Joya Singh is a 80 y.o. female, who presents with a chief complaint of   Chief Complaint   Patient presents with   • Follow-up   • Diabetes   • Recurrent Skin Infections     right       HPI     Foot pain-Patient complains of severe pain localized to right foot that worsens with weight bearing on transfer. Daughter-in-law states she also noticed limping that is not typical for her. No fevers, chills, shortness of air including on exertion, chills or myalgias. No recent injuries or trauma.     Chronic pain/Immobility-Continues on Gabapentin and Norco. Family struggling with care needs, trying to obtain a waiver to get funding for daughter-in-law to help at home. OT comes to home and helps with left shoulder mobility. PT is planning on coming soon.     Shoulder pain-Continued poor mobility, last shoulder injection 10/6, reports significant improvement with this. Trying to do therapy as above.     SOA-Struggles with consistent shortness of air and fatigue, continues on 2 L O2 nightly.     MDS-Follows with oncology, recently saw in 12/9/20, on procrit/neupogen. No transfusion required in 2 months, Hgb today 10.3.     HLD-On statin, lipid panel well controled on recent labs. No myalgias/cramps.     DM2- Average blood sugars reported to be . Endorses 4-5 low blood sugars <60 in the past month where she is was symptomatic. Taking 54 u Tresiba, prescribed 5u meal time insulin but hasn't been taking in a couple months due to hypoglycemia. Ate a lot of sugars during the holidays but daughter-in-law states did not become significantly uncontrolled.     HTN-BP well controlled today in office. Previously suffered from orthostatic hypotension but not recently. On lasix for peripheral edema but otherwise no peripheral edema.     Urge incontinence-Follows with urology, on myrbetriq which has been helping a lot. Recently experiencing experiencing significant frequency but off myrbetriq for a period of time, when restarted  all symptoms resolved. Denies current dysuria, urgency, frequency, fevers, abdominal pain or nausea/vomiting.     The following portions of the patient's history were reviewed and updated as appropriate: allergies, current medications, past family history, past medical history, past social history, past surgical history and problem list.    Allergies: Baclofen, Codeine, Lisinopril, Morphine, and Penicillins    Current Outpatient Medications:   •  ACCU-CHEK FASTCLIX LANCETS misc, TEST 3-4 TIMES DAILY AS DIRECTED, Disp: 400 each, Rfl: 3  •  ACCU-CHEK SMARTVIEW test strip, TEST BLOOD SUGAR THREE TIMES DAILY OR AS DIRECTED, Disp: 300 each, Rfl: 3  •  acetaminophen (TYLENOL) 325 MG tablet, Take 2 tablets by mouth Every 6 (Six) Hours As Needed for Mild Pain . OTC product, Disp: 40 tablet, Rfl: 0  •  atorvastatin (LIPITOR) 10 MG tablet, TAKE ONE TABLET BY MOUTH AT BEDTIME, Disp: 90 tablet, Rfl: 1  •  Blood Glucose Monitoring Suppl (ACCU-CHEK KENNETH SMARTVIEW) w/Device kit, TEST blood sugar three times daily or as directed, Disp: 1 kit, Rfl: 0  •  cyclobenzaprine (FLEXERIL) 10 MG tablet, TAKE ONE TABLET BY MOUTH THREE TIMES DAILY as needed for muscle spasms, Disp: 30 tablet, Rfl: 0  •  desvenlafaxine (PRISTIQ) 50 MG 24 hr tablet, TAKE ONE TABLET BY MOUTH EVERY DAY, Disp: 90 tablet, Rfl: 1  •  Diclofenac Sodium (Voltaren) 1 % gel gel, Apply 4 g topically to the appropriate area as directed 4 (Four) Times a Day As Needed (shoulder pain)., Disp: 400 g, Rfl: 2  •  dicyclomine (Bentyl) 10 MG capsule, Take 1 cap po q 8 hours prn spasm, Disp: 60 capsule, Rfl: 0  •  diphenhydrAMINE (BENADRYL) 25 mg capsule, Take 25 mg by mouth Every 6 (Six) Hours As Needed for Itching., Disp: , Rfl:   •  Eliquis 5 MG tablet tablet, TAKE ONE TABLET BY MOUTH TWICE DAILY, Disp: 180 tablet, Rfl: 0  •  furosemide (LASIX) 20 MG tablet, TAKE ONE (1) TABLET ORALLY (BY MOUTH) ONCE DAILY, Disp: 90 tablet, Rfl: 1  •  gabapentin (NEURONTIN) 400 MG capsule, TAKE  "ONE CAPSULE BY MOUTH EVERY MORNING, AT NOON, AND TWO AT BEDTIME, Disp: 120 capsule, Rfl: 2  •  HYDROcodone-acetaminophen (Norco) 5-325 MG per tablet, Take 1 tablet by mouth Every 12 (Twelve) Hours As Needed for Severe Pain ., Disp: 30 tablet, Rfl: 0  •  insulin aspart (novoLOG) 100 UNIT/ML injection, Inject 5 Units under the skin into the appropriate area as directed 3 (Three) Times a Day With Meals., Disp: , Rfl: 12  •  Insulin Degludec (Tresiba FlexTouch) 200 UNIT/ML solution pen-injector pen injection, Inject 50 Units under the skin into the appropriate area as directed Every Night., Disp: 9 mL, Rfl: 11  •  levothyroxine (SYNTHROID, LEVOTHROID) 88 MCG tablet, TAKE ONE TABLET BY MOUTH EVERY DAY, Disp: 90 tablet, Rfl: 1  •  midodrine (PROAMATINE) 2.5 MG tablet, Take 2.5 mg by mouth 3 (Three) Times a Day., Disp: , Rfl:   •  Mirabegron ER (Myrbetriq) 25 MG tablet sustained-release 24 hour 24 hr tablet, Take 25 mg by mouth Daily., Disp: , Rfl:   •  Needle, Disp, (BD DISP NEEDLES) 30G X 1/2\" misc, To be used 3 times daily with Novolog Flexpen., Disp: 100 each, Rfl: 5  •  nystatin (MYCOSTATIN) 351671 UNIT/GM powder, Apply  topically to the appropriate area as directed 2 (Two) Times a Day., Disp: 60 g, Rfl: 2  •  O2 (OXYGEN), Inhale 2 L/min Every Night. Uses 2L  overnight only, Disp: , Rfl:   •  omeprazole (priLOSEC) 40 MG capsule, TAKE ONE CAPSULE BY MOUTH EVERY DAY, Disp: 90 capsule, Rfl: 1  •  ondansetron (ZOFRAN) 8 MG tablet, Take 1 tablet by mouth 3 (Three) Times a Day As Needed for Nausea or Vomiting., Disp: 30 tablet, Rfl: 5  •  phenazopyridine (PYRIDIUM) 200 MG tablet, Take 200 mg by mouth 3 (Three) Times a Day., Disp: , Rfl:   •  potassium chloride 10 MEQ CR tablet, TAKE ONE TABLET BY MOUTH EVERY DAY, Disp: 90 tablet, Rfl: 2  •  sennosides-docusate (senna-docusate sodium) 8.6-50 MG per tablet, Take 2 tablets by mouth Daily., Disp: 60 tablet, Rfl: 3  •  sulfamethoxazole-trimethoprim (BACTRIM,SEPTRA) 400-80 MG " tablet, Take 1 tablet by mouth every night at bedtime., Disp: , Rfl:   •  UltiCare Mini Pen Needles 31G X 6 MM misc, USE TO inject insulins FOUR TIMES DAILY AS DIRECTED, Disp: 400 each, Rfl: 3  •  Ventolin  (90 Base) MCG/ACT inhaler, INHALE TWO PUFFS BY MOUTH EVERY 4 HOURS AS NEEDED FOR WHEEZING, Disp: 18 g, Rfl: 3  •  cephalexin (Keflex) 500 MG capsule, Take 1 capsule by mouth 3 (Three) Times a Day for 10 days., Disp: 30 capsule, Rfl: 0  No current facility-administered medications for this visit.     Facility-Administered Medications Ordered in Other Visits:   •  acetaminophen (TYLENOL) tablet 650 mg, 650 mg, Oral, Q6H PRN, Elieser Headley MD  •  diphenhydrAMINE (BENADRYL) capsule 25 mg, 25 mg, Oral, Once, Elieser Headley MD  Medications Discontinued During This Encounter   Medication Reason   • HYDROcodone-acetaminophen (Norco) 5-325 MG per tablet Reorder   • TRESIBA FLEXTOUCH 200 UNIT/ML solution pen-injector pen injection    • nystatin (MYCOSTATIN) 445605 UNIT/GM powder Reorder       Review of Systems   Constitutional: Positive for fatigue. Negative for activity change, chills and fever.   HENT: Negative for congestion, dental problem, postnasal drip, rhinorrhea, sneezing and trouble swallowing.    Eyes: Negative for photophobia and discharge.   Respiratory: Positive for shortness of breath (Chronic as per HPI, no acute SOA). Negative for apnea, cough and wheezing.    Cardiovascular: Negative for chest pain and leg swelling.   Gastrointestinal: Negative for abdominal pain, constipation, diarrhea and vomiting.   Endocrine: Negative.    Genitourinary: Positive for urgency. Negative for decreased urine volume, menstrual problem, pelvic pain, vaginal discharge and vaginal pain.   Musculoskeletal: Negative.    Skin: Negative.    Allergic/Immunologic: Negative.  Negative for environmental allergies and food allergies.   Neurological: Positive for headaches.   Hematological: Negative.   "  Psychiatric/Behavioral: Negative.              /72 (BP Location: Right arm, Patient Position: Sitting, Cuff Size: Adult)   Pulse 76   Temp 97.6 °F (36.4 °C) (Temporal)   Resp 16   Ht 157 cm (61.81\")   Wt 89.4 kg (197 lb)   SpO2 92%   BMI 36.25 kg/m²       Physical Exam  Vitals signs and nursing note reviewed.   Constitutional:       General: She is not in acute distress.     Appearance: Normal appearance. She is normal weight. She is not toxic-appearing.   HENT:      Head: Normocephalic and atraumatic.      Right Ear: There is no impacted cerumen.      Left Ear: There is no impacted cerumen.      Nose: Nose normal. No congestion or rhinorrhea.      Mouth/Throat:      Mouth: Mucous membranes are moist.      Pharynx: No oropharyngeal exudate or posterior oropharyngeal erythema.   Eyes:      Extraocular Movements: Extraocular movements intact.      Conjunctiva/sclera: Conjunctivae normal.      Pupils: Pupils are equal, round, and reactive to light.   Neck:      Musculoskeletal: Normal range of motion and neck supple.   Cardiovascular:      Rate and Rhythm: Normal rate and regular rhythm.      Pulses: Normal pulses.      Heart sounds: Normal heart sounds. No murmur.   Pulmonary:      Effort: Pulmonary effort is normal. No respiratory distress.      Breath sounds: Normal breath sounds. No wheezing.      Comments: Diminished breath sounds bilaterally   Abdominal:      General: Abdomen is flat. Bowel sounds are normal. There is no distension.      Tenderness: There is no abdominal tenderness.   Musculoskeletal: Normal range of motion.   Skin:     General: Skin is warm.      Capillary Refill: Capillary refill takes less than 2 seconds.   Neurological:      General: No focal deficit present.      Mental Status: She is alert and oriented to person, place, and time.   Psychiatric:         Mood and Affect: Mood normal.         Behavior: Behavior normal.         Lab Results (most recent)     None          Results " for orders placed or performed in visit on 01/13/21   CBC Auto Differential    Specimen: Blood   Result Value Ref Range    WBC 6.50 3.40 - 10.80 10*3/mm3    RBC 3.39 (L) 3.77 - 5.28 10*6/mm3    Hemoglobin 10.6 (L) 12.0 - 15.9 g/dL    Hematocrit 33.7 (L) 34.0 - 46.6 %    MCV 99.4 (H) 79.0 - 97.0 fL    MCH 31.3 26.6 - 33.0 pg    MCHC 31.5 31.5 - 35.7 g/dL    RDW 21.8 (H) 12.3 - 15.4 %    RDW-SD 79.7 (H) 37.0 - 54.0 fl    MPV 11.7 6.0 - 12.0 fL    Platelets 286 140 - 450 10*3/mm3    Neutrophil % 57.2 42.7 - 76.0 %    Lymphocyte % 30.8 19.6 - 45.3 %    Monocyte % 4.9 (L) 5.0 - 12.0 %    Eosinophil % 4.8 0.3 - 6.2 %    Basophil % 1.1 0.0 - 1.5 %    Immature Grans % 1.2 (H) 0.0 - 0.5 %    Neutrophils, Absolute 3.72 1.70 - 7.00 10*3/mm3    Lymphocytes, Absolute 2.00 0.70 - 3.10 10*3/mm3    Monocytes, Absolute 0.32 0.10 - 0.90 10*3/mm3    Eosinophils, Absolute 0.31 0.00 - 0.40 10*3/mm3    Basophils, Absolute 0.07 0.00 - 0.20 10*3/mm3    Immature Grans, Absolute 0.08 (H) 0.00 - 0.05 10*3/mm3    nRBC 0.0 0.0 - 0.2 /100 WBC     *Note: Due to a large number of results and/or encounters for the requested time period, some results have not been displayed. A complete set of results can be found in Results Review.           Diagnoses and all orders for this visit:    1. Right calf pain (Primary)  -     US venous doppler lower extremity right (duplex)    2. Monoclonal gammopathy of unknown significance (MGUS)    3. Anemia requiring transfusions    4. Type 2 diabetes mellitus with diabetic neuropathy, with long-term current use of insulin (CMS/[facility])    5. CKD stage 3 due to type 2 diabetes mellitus (CMS/[facility])    6. Acquired hypothyroidism    7. Essential hypertension    8. Mixed hyperlipidemia    9. Chronic diastolic CHF (congestive heart failure) (CMS/[facility])    10. Pain and swelling of right lower extremity  -     US venous doppler lower extremity right (duplex)    11. Chronic right shoulder pain  -     HYDROcodone-acetaminophen (Norco)  5-325 MG per tablet; Take 1 tablet by mouth Every 12 (Twelve) Hours As Needed for Severe Pain .  Dispense: 30 tablet; Refill: 0    12. Rash  -     nystatin (MYCOSTATIN) 697919 UNIT/GM powder; Apply  topically to the appropriate area as directed 2 (Two) Times a Day.  Dispense: 60 g; Refill: 2    Other orders  -     Insulin Degludec (Tresiba FlexTouch) 200 UNIT/ML solution pen-injector pen injection; Inject 50 Units under the skin into the appropriate area as directed Every Night.  Dispense: 9 mL; Refill: 11  -     cephalexin (Keflex) 500 MG capsule; Take 1 capsule by mouth 3 (Three) Times a Day for 10 days.  Dispense: 30 capsule; Refill: 0          Return in about 6 weeks (around 2/24/2021) for Recheck, sooner if needed based on LE u/s today.    Barney Faustin MD  Internal Medicine-Pediatrics, PGY-3    I have seen and examined the patient independently.  Agree with above.  Hx from pt and her DIL.    Will check stat u/s of RLE to r/o clot.  Pt is on eliquis but has complicating factor of mylodysplastic syndrome.  If u/s neg will cover with keflex for possible early cellulitis    Reduce tresiba to 50 units nightly and discussed that if pt cont to have further hypoglycemia they should cont to reduce tresiba.  They can go as low as 40 units nightly but should call if pt still having hypoglycemia at this dose.     45 minutes was spent in patient care with >50% in counseling about the above issues including medications and disease management plan.

## 2021-01-14 ENCOUNTER — TELEPHONE (OUTPATIENT)
Dept: INTERNAL MEDICINE | Facility: CLINIC | Age: 81
End: 2021-01-14

## 2021-01-14 NOTE — TELEPHONE ENCOUNTER
Caller: Joya Singh    Relationship: Self    Best call back number: [phone]    Caller requesting test results: ULTRASOUND    What test was performed:ULTRASOUND    When was the test performed: 01/13    Where was the test performed: IN THE OFFICE    Additional notes: PATIENT CALLED REQUESTING THE RESULTS FROM HER ULTRASOUND.  PATIENT STATED THAT SHE HAS TO HAVE SOMEONE  THE MEDICATION FOR HER BUT WANTED TO BE SURE THAT SHE NEEDED TO TAKE IT.

## 2021-01-20 ENCOUNTER — LAB (OUTPATIENT)
Dept: LAB | Facility: HOSPITAL | Age: 81
End: 2021-01-20

## 2021-01-20 ENCOUNTER — INFUSION (OUTPATIENT)
Dept: ONCOLOGY | Facility: HOSPITAL | Age: 81
End: 2021-01-20

## 2021-01-20 DIAGNOSIS — D64.9 ANEMIA REQUIRING TRANSFUSIONS: ICD-10-CM

## 2021-01-20 DIAGNOSIS — D46.C MYELODYSPLASTIC SYNDROME WITH 5Q DELETION (HCC): ICD-10-CM

## 2021-01-20 LAB
BASOPHILS # BLD AUTO: 0.06 10*3/MM3 (ref 0–0.2)
BASOPHILS NFR BLD AUTO: 1.4 % (ref 0–1.5)
DEPRECATED RDW RBC AUTO: 76.5 FL (ref 37–54)
EOSINOPHIL # BLD AUTO: 0.19 10*3/MM3 (ref 0–0.4)
EOSINOPHIL NFR BLD AUTO: 4.3 % (ref 0.3–6.2)
ERYTHROCYTE [DISTWIDTH] IN BLOOD BY AUTOMATED COUNT: 21 % (ref 12.3–15.4)
HCT VFR BLD AUTO: 34.2 % (ref 34–46.6)
HGB BLD-MCNC: 10.8 G/DL (ref 12–15.9)
IMM GRANULOCYTES # BLD AUTO: 0.11 10*3/MM3 (ref 0–0.05)
IMM GRANULOCYTES NFR BLD AUTO: 2.5 % (ref 0–0.5)
LYMPHOCYTES # BLD AUTO: 1.57 10*3/MM3 (ref 0.7–3.1)
LYMPHOCYTES NFR BLD AUTO: 35.8 % (ref 19.6–45.3)
MCH RBC QN AUTO: 31.4 PG (ref 26.6–33)
MCHC RBC AUTO-ENTMCNC: 31.6 G/DL (ref 31.5–35.7)
MCV RBC AUTO: 99.4 FL (ref 79–97)
MONOCYTES # BLD AUTO: 0.3 10*3/MM3 (ref 0.1–0.9)
MONOCYTES NFR BLD AUTO: 6.8 % (ref 5–12)
NEUTROPHILS NFR BLD AUTO: 2.16 10*3/MM3 (ref 1.7–7)
NEUTROPHILS NFR BLD AUTO: 49.2 % (ref 42.7–76)
NRBC BLD AUTO-RTO: 0.5 /100 WBC (ref 0–0.2)
PLATELET # BLD AUTO: 262 10*3/MM3 (ref 140–450)
PMV BLD AUTO: 11.9 FL (ref 6–12)
RBC # BLD AUTO: 3.44 10*6/MM3 (ref 3.77–5.28)
WBC # BLD AUTO: 4.39 10*3/MM3 (ref 3.4–10.8)

## 2021-01-20 PROCEDURE — 36415 COLL VENOUS BLD VENIPUNCTURE: CPT

## 2021-01-20 PROCEDURE — 85025 COMPLETE CBC W/AUTO DIFF WBC: CPT | Performed by: INTERNAL MEDICINE

## 2021-01-22 ENCOUNTER — OFFICE VISIT (OUTPATIENT)
Dept: INTERNAL MEDICINE | Facility: CLINIC | Age: 81
End: 2021-01-22

## 2021-01-22 VITALS
HEART RATE: 63 BPM | WEIGHT: 203 LBS | DIASTOLIC BLOOD PRESSURE: 70 MMHG | RESPIRATION RATE: 16 BRPM | SYSTOLIC BLOOD PRESSURE: 134 MMHG | TEMPERATURE: 96.9 F | OXYGEN SATURATION: 94 % | BODY MASS INDEX: 37.36 KG/M2 | HEIGHT: 62 IN

## 2021-01-22 DIAGNOSIS — D46.C MYELODYSPLASTIC SYNDROME WITH 5Q DELETION (HCC): ICD-10-CM

## 2021-01-22 DIAGNOSIS — E11.65 UNCONTROLLED TYPE 2 DIABETES MELLITUS WITH HYPERGLYCEMIA (HCC): ICD-10-CM

## 2021-01-22 DIAGNOSIS — D64.9 ANEMIA REQUIRING TRANSFUSIONS: Primary | ICD-10-CM

## 2021-01-22 DIAGNOSIS — I50.32 CHRONIC DIASTOLIC CHF (CONGESTIVE HEART FAILURE) (HCC): ICD-10-CM

## 2021-01-22 DIAGNOSIS — M19.91 PRIMARY OSTEOARTHRITIS, UNSPECIFIED SITE: ICD-10-CM

## 2021-01-22 PROCEDURE — 99214 OFFICE O/P EST MOD 30 MIN: CPT | Performed by: INTERNAL MEDICINE

## 2021-01-22 RX ORDER — CYCLOBENZAPRINE HCL 10 MG
TABLET ORAL
Qty: 45 TABLET | Refills: 0 | Status: SHIPPED | OUTPATIENT
Start: 2021-01-22 | End: 2021-01-01

## 2021-01-22 NOTE — PROGRESS NOTES
"Chief Complaint  Leg Swelling (f/u right leg-still on keflex)    Subjective          Joya Singh presents to [facility] for   History of Present Illness   Pt here for f/u    Her foot pain is improved.      Pt's wheelchair is broken and needs a new one.  She can transfer on her own but is very limited in mobility bc of fatigue and chronic msk pain.  She is not a candidate for surgery at this time.     MDS-Follows with oncology, recently saw in 12/9/20, on procrit/neupogen. No transfusion required in 2 months, Hgb today 10.3.       DM2- insulin lowered at last appt but pt didn't change her dose.  dhe denies hypoglycemia since last OV. Taking 54 u Tresiba, prescribed 5u meal time insulin but hasn't been taking in a couple months due to hypoglycemia.     She had her eye exam 3 days ago and says there was \"blood in her eye\"  She goes to see a specialist in Feb.    Objective   Vital Signs:   /70 (BP Location: Left arm, Patient Position: Sitting, Cuff Size: Large Adult)   Pulse 63   Temp 96.9 °F (36.1 °C) (Temporal)   Resp 16   Ht 157.5 cm (62\")   Wt 92.1 kg (203 lb)   SpO2 94%   BMI 37.13 kg/m²     Physical Exam  Vitals signs and nursing note reviewed.   Constitutional:       General: She is not in acute distress.     Appearance: She is well-developed.   HENT:      Head: Normocephalic and atraumatic.      Right Ear: External ear normal.      Left Ear: External ear normal.      Nose: Nose normal.   Eyes:      Conjunctiva/sclera: Conjunctivae normal.      Pupils: Pupils are equal, round, and reactive to light.   Neck:      Musculoskeletal: Normal range of motion and neck supple.   Cardiovascular:      Rate and Rhythm: Normal rate and regular rhythm.      Heart sounds: Normal heart sounds.   Pulmonary:      Effort: Pulmonary effort is normal. No respiratory distress.      Breath sounds: Normal breath sounds. No wheezing.   Musculoskeletal:      Comments: In wheelchair "   Skin:     General: Skin is warm and dry.   Neurological:      Mental Status: She is alert and oriented to person, place, and time.   Psychiatric:         Behavior: Behavior normal.         Thought Content: Thought content normal.         Judgment: Judgment normal.        Result Review :   The following data was reviewed by: Chari Mercdao MD on 01/22/2021:  Common labs    Common Labsle 1/12/21 1/12/21 1/12/21 1/12/21 1/12/21 1/13/21 1/20/21    1010 1010 1010 1010 1010     Glucose  62 (A)        BUN  29 (A)        Creatinine  2.23 (A)        eGFR Non  Am  21 (A)        eGFR  Am  24 (A)        Sodium  144        Potassium  4.7        Chloride  104        Calcium  9.1        Total Protein  6.6        Albumin  4.1        Total Bilirubin  0.4        Alkaline Phosphatase  122 (A)        AST (SGOT)  10        ALT (SGPT)  10        WBC 5.5     6.50 4.39   Hemoglobin 9.9 (A)     10.6 (A) 10.8 (A)   Hematocrit 30.7 (A)     33.7 (A) 34.2   Platelets 294     286 262   Total Cholesterol   91 (A)       Triglycerides   57       HDL Cholesterol   42       LDL Cholesterol    36       Hemoglobin A1C     6.1 (A)     Microalbumin, Urine    244.5      (A) Abnormal value       Comments are available for some flowsheets but are not being displayed.                     Assessment and Plan    Problem List Items Addressed This Visit        Cardiac and Vasculature    Chronic diastolic CHF (congestive heart failure) (CMS/HCC)    Relevant Orders    Lightweight Wheelchair       Endocrine and Metabolic    Uncontrolled type 2 diabetes mellitus with hyperglycemia (CMS/[facility])    Relevant Orders    Lightweight Wheelchair       Hematology and Neoplasia    Anemia requiring transfusions - Primary    Relevant Orders    Lightweight Wheelchair    Myelodysplastic syndrome with 5q deletion (CMS/[facility])    Relevant Orders    Lightweight Wheelchair      Other Visit Diagnoses     Primary osteoarthritis, unspecified site        Relevant Orders     Lightweight Wheelchair      Continue current medications.  Encouraged healthy diet/exercise.  F/u 6 weeks  Pt to call sooner if any other issues arise.        Follow Up   Return for Next scheduled follow up appt 3/3/21.  Patient was given instructions and counseling regarding her condition or for health maintenance advice. Please see specific information pulled into the AVS if appropriate.

## 2021-01-26 DIAGNOSIS — E03.9 ACQUIRED HYPOTHYROIDISM: ICD-10-CM

## 2021-01-26 DIAGNOSIS — K21.9 GASTROESOPHAGEAL REFLUX DISEASE: ICD-10-CM

## 2021-01-26 RX ORDER — APIXABAN 5 MG/1
TABLET, FILM COATED ORAL
Qty: 180 TABLET | Refills: 0 | Status: SHIPPED | OUTPATIENT
Start: 2021-01-26 | End: 2021-01-01

## 2021-01-26 RX ORDER — ATORVASTATIN CALCIUM 10 MG/1
TABLET, FILM COATED ORAL
Qty: 90 TABLET | Refills: 1 | OUTPATIENT
Start: 2021-01-26

## 2021-01-26 RX ORDER — OMEPRAZOLE 40 MG/1
CAPSULE, DELAYED RELEASE ORAL
Qty: 90 CAPSULE | Refills: 1 | Status: SHIPPED | OUTPATIENT
Start: 2021-01-26 | End: 2021-01-01

## 2021-01-26 RX ORDER — LEVOTHYROXINE SODIUM 88 UG/1
TABLET ORAL
Qty: 90 TABLET | Refills: 1 | Status: SHIPPED | OUTPATIENT
Start: 2021-01-26 | End: 2021-01-01

## 2021-01-26 RX ORDER — FUROSEMIDE 20 MG/1
TABLET ORAL
Qty: 90 TABLET | Refills: 1 | OUTPATIENT
Start: 2021-01-26

## 2021-01-27 ENCOUNTER — APPOINTMENT (OUTPATIENT)
Dept: ONCOLOGY | Facility: HOSPITAL | Age: 81
End: 2021-01-27

## 2021-01-27 ENCOUNTER — APPOINTMENT (OUTPATIENT)
Dept: LAB | Facility: HOSPITAL | Age: 81
End: 2021-01-27

## 2021-01-27 ENCOUNTER — TELEPHONE (OUTPATIENT)
Dept: INTERNAL MEDICINE | Facility: CLINIC | Age: 81
End: 2021-01-27

## 2021-01-28 ENCOUNTER — INFUSION (OUTPATIENT)
Dept: ONCOLOGY | Facility: HOSPITAL | Age: 81
End: 2021-01-28

## 2021-01-28 ENCOUNTER — LAB (OUTPATIENT)
Dept: LAB | Facility: HOSPITAL | Age: 81
End: 2021-01-28

## 2021-01-28 VITALS — TEMPERATURE: 98.1 F

## 2021-01-28 DIAGNOSIS — D64.9 ANEMIA REQUIRING TRANSFUSIONS: ICD-10-CM

## 2021-01-28 DIAGNOSIS — D46.C MYELODYSPLASTIC SYNDROME WITH 5Q DELETION (HCC): ICD-10-CM

## 2021-01-28 LAB
BASOPHILS # BLD AUTO: 0.05 10*3/MM3 (ref 0–0.2)
BASOPHILS NFR BLD AUTO: 1.3 % (ref 0–1.5)
DEPRECATED RDW RBC AUTO: 72.4 FL (ref 37–54)
EOSINOPHIL # BLD AUTO: 0.22 10*3/MM3 (ref 0–0.4)
EOSINOPHIL NFR BLD AUTO: 5.6 % (ref 0.3–6.2)
ERYTHROCYTE [DISTWIDTH] IN BLOOD BY AUTOMATED COUNT: 20.7 % (ref 12.3–15.4)
HCT VFR BLD AUTO: 31.7 % (ref 34–46.6)
HGB BLD-MCNC: 10.1 G/DL (ref 12–15.9)
IMM GRANULOCYTES # BLD AUTO: 0.08 10*3/MM3 (ref 0–0.05)
IMM GRANULOCYTES NFR BLD AUTO: 2 % (ref 0–0.5)
LYMPHOCYTES # BLD AUTO: 1.52 10*3/MM3 (ref 0.7–3.1)
LYMPHOCYTES NFR BLD AUTO: 38.7 % (ref 19.6–45.3)
MCH RBC QN AUTO: 30.9 PG (ref 26.6–33)
MCHC RBC AUTO-ENTMCNC: 31.9 G/DL (ref 31.5–35.7)
MCV RBC AUTO: 96.9 FL (ref 79–97)
MONOCYTES # BLD AUTO: 0.21 10*3/MM3 (ref 0.1–0.9)
MONOCYTES NFR BLD AUTO: 5.3 % (ref 5–12)
NEUTROPHILS NFR BLD AUTO: 1.85 10*3/MM3 (ref 1.7–7)
NEUTROPHILS NFR BLD AUTO: 47.1 % (ref 42.7–76)
NRBC BLD AUTO-RTO: 0 /100 WBC (ref 0–0.2)
PLATELET # BLD AUTO: 221 10*3/MM3 (ref 140–450)
PMV BLD AUTO: 11.5 FL (ref 6–12)
RBC # BLD AUTO: 3.27 10*6/MM3 (ref 3.77–5.28)
WBC # BLD AUTO: 3.93 10*3/MM3 (ref 3.4–10.8)

## 2021-01-28 PROCEDURE — 85025 COMPLETE CBC W/AUTO DIFF WBC: CPT | Performed by: INTERNAL MEDICINE

## 2021-01-28 NOTE — NURSING NOTE
Hgb 10.1 today. The pt was informed of results and that she again does not need procrit and neupogen injections today. The pt v/u and will return next week as scheduled.

## 2021-01-29 ENCOUNTER — TELEPHONE (OUTPATIENT)
Dept: INTERNAL MEDICINE | Facility: CLINIC | Age: 81
End: 2021-01-29

## 2021-02-01 ENCOUNTER — TELEPHONE (OUTPATIENT)
Dept: INTERNAL MEDICINE | Facility: CLINIC | Age: 81
End: 2021-02-01

## 2021-02-01 NOTE — TELEPHONE ENCOUNTER
Caller: MOHIT    Relationship to patient: Home Health    Best call back number: [phone]    Patient is needing: MOHIT IS THE OCC SPECIALIST AND SHE IS REQUESTING ORDERS FOR PHYSICAL THERAPY. PLEASE ADVISE.

## 2021-02-03 ENCOUNTER — LAB (OUTPATIENT)
Dept: LAB | Facility: HOSPITAL | Age: 81
End: 2021-02-03

## 2021-02-03 ENCOUNTER — INFUSION (OUTPATIENT)
Dept: ONCOLOGY | Facility: HOSPITAL | Age: 81
End: 2021-02-03

## 2021-02-03 VITALS — TEMPERATURE: 97.5 F

## 2021-02-03 DIAGNOSIS — E11.65 UNCONTROLLED TYPE 2 DIABETES MELLITUS WITH HYPERGLYCEMIA (HCC): ICD-10-CM

## 2021-02-03 DIAGNOSIS — D64.9 ANEMIA REQUIRING TRANSFUSIONS: ICD-10-CM

## 2021-02-03 DIAGNOSIS — M25.569 CHRONIC KNEE PAIN, UNSPECIFIED LATERALITY: ICD-10-CM

## 2021-02-03 DIAGNOSIS — D46.C MYELODYSPLASTIC SYNDROME WITH 5Q DELETION (HCC): ICD-10-CM

## 2021-02-03 DIAGNOSIS — E11.22 CKD STAGE 3 DUE TO TYPE 2 DIABETES MELLITUS (HCC): ICD-10-CM

## 2021-02-03 DIAGNOSIS — N18.30 CKD STAGE 3 DUE TO TYPE 2 DIABETES MELLITUS (HCC): ICD-10-CM

## 2021-02-03 DIAGNOSIS — G89.29 CHRONIC KNEE PAIN, UNSPECIFIED LATERALITY: ICD-10-CM

## 2021-02-03 DIAGNOSIS — I50.32 CHRONIC DIASTOLIC CHF (CONGESTIVE HEART FAILURE) (HCC): Primary | ICD-10-CM

## 2021-02-03 DIAGNOSIS — D46.C MYELODYSPLASTIC SYNDROME WITH 5Q DELETION (HCC): Primary | ICD-10-CM

## 2021-02-03 LAB
BASOPHILS # BLD AUTO: 0.12 10*3/MM3 (ref 0–0.2)
BASOPHILS NFR BLD AUTO: 2.2 % (ref 0–1.5)
DEPRECATED RDW RBC AUTO: 75.8 FL (ref 37–54)
EOSINOPHIL # BLD AUTO: 0.22 10*3/MM3 (ref 0–0.4)
EOSINOPHIL NFR BLD AUTO: 4.1 % (ref 0.3–6.2)
ERYTHROCYTE [DISTWIDTH] IN BLOOD BY AUTOMATED COUNT: 20.8 % (ref 12.3–15.4)
HCT VFR BLD AUTO: 29.7 % (ref 34–46.6)
HGB BLD-MCNC: 9.3 G/DL (ref 12–15.9)
IMM GRANULOCYTES # BLD AUTO: 0.04 10*3/MM3 (ref 0–0.05)
IMM GRANULOCYTES NFR BLD AUTO: 0.7 % (ref 0–0.5)
LYMPHOCYTES # BLD AUTO: 1.64 10*3/MM3 (ref 0.7–3.1)
LYMPHOCYTES NFR BLD AUTO: 30.6 % (ref 19.6–45.3)
MCH RBC QN AUTO: 32 PG (ref 26.6–33)
MCHC RBC AUTO-ENTMCNC: 31.3 G/DL (ref 31.5–35.7)
MCV RBC AUTO: 102.1 FL (ref 79–97)
MONOCYTES # BLD AUTO: 0.22 10*3/MM3 (ref 0.1–0.9)
MONOCYTES NFR BLD AUTO: 4.1 % (ref 5–12)
NEUTROPHILS NFR BLD AUTO: 3.12 10*3/MM3 (ref 1.7–7)
NEUTROPHILS NFR BLD AUTO: 58.3 % (ref 42.7–76)
PLATELET # BLD AUTO: 285 10*3/MM3 (ref 140–450)
PMV BLD AUTO: 11.9 FL (ref 6–12)
RBC # BLD AUTO: 2.91 10*6/MM3 (ref 3.77–5.28)
WBC # BLD AUTO: 5.36 10*3/MM3 (ref 3.4–10.8)

## 2021-02-03 PROCEDURE — 85025 COMPLETE CBC W/AUTO DIFF WBC: CPT | Performed by: NURSE PRACTITIONER

## 2021-02-03 PROCEDURE — 36415 COLL VENOUS BLD VENIPUNCTURE: CPT

## 2021-02-03 PROCEDURE — 96372 THER/PROPH/DIAG INJ SC/IM: CPT

## 2021-02-03 PROCEDURE — 25010000002 FILGRASTIM 300 MCG/0.5ML SOLUTION PREFILLED SYRINGE: Performed by: NURSE PRACTITIONER

## 2021-02-03 PROCEDURE — 25010000002 EPOETIN ALFA PER 1000 UNITS: Performed by: INTERNAL MEDICINE

## 2021-02-03 RX ORDER — NITROFURANTOIN 25; 75 MG/1; MG/1
100 CAPSULE ORAL DAILY
COMMUNITY
Start: 2021-01-12 | End: 2021-02-03 | Stop reason: SDUPTHER

## 2021-02-03 RX ORDER — NITROFURANTOIN MACROCRYSTALS 100 MG/1
100 CAPSULE ORAL DAILY
COMMUNITY
Start: 2021-01-12 | End: 2021-01-01

## 2021-02-03 RX ADMIN — ERYTHROPOIETIN 60000 UNITS: 40000 INJECTION, SOLUTION INTRAVENOUS; SUBCUTANEOUS at 10:40

## 2021-02-03 RX ADMIN — FILGRASTIM 300 MCG: 300 INJECTION, SOLUTION INTRAVENOUS; SUBCUTANEOUS at 10:38

## 2021-02-05 ENCOUNTER — TELEPHONE (OUTPATIENT)
Dept: INTERNAL MEDICINE | Facility: CLINIC | Age: 81
End: 2021-02-05

## 2021-02-05 NOTE — TELEPHONE ENCOUNTER
Caller: GARRICK    Relationship to patient: Other    Best call back [phone]    Patient is needing: Garrick from [facility] called in to get verbal order for PT for 2 times a week for 3 weeks. Also stated that patient was reporting new onset of numbness and tingling in right hand but no pain, and vitals were within normal limits. Please advise.

## 2021-02-10 ENCOUNTER — APPOINTMENT (OUTPATIENT)
Dept: LAB | Facility: HOSPITAL | Age: 81
End: 2021-02-10

## 2021-02-10 ENCOUNTER — APPOINTMENT (OUTPATIENT)
Dept: ONCOLOGY | Facility: HOSPITAL | Age: 81
End: 2021-02-10

## 2021-02-16 ENCOUNTER — TELEPHONE (OUTPATIENT)
Dept: INTERNAL MEDICINE | Facility: CLINIC | Age: 81
End: 2021-02-16

## 2021-02-16 NOTE — TELEPHONE ENCOUNTER
Pt has furosimide 20mg tabs.  She can take 2 a day x 3 days but needs OV if this isnt helping or if any worsening

## 2021-02-16 NOTE — TELEPHONE ENCOUNTER
Caller: [facility]     [place]    Best call back number:[phone]    REGARDING INCREASING FLUID PILLS DUE TO SWELLING IN LEGS

## 2021-02-18 NOTE — TELEPHONE ENCOUNTER
Called patient back, all she needed ws Dr. Fall's phone number which she found prior to me calling her back.

## 2021-02-18 NOTE — TELEPHONE ENCOUNTER
PT CALLED REQUESTING TO CHANGE AN APPOINTMENT ON 3/2 WITH DR. SINGLETARY, WHO SHE SAYS IS IN THE OFFICE ON TUESDAYS.    DO NOT SEE IN APPTS, AND NOT SURE WHO SHE IS TALKING ABOUT.    PLEASE CALL BACK [phone]

## 2021-02-25 NOTE — TELEPHONE ENCOUNTER
Patient calls requesting pcp to please fax script for diabetic shoes to [facility] at [phone].    Yusuf Phone # [phone]

## 2021-02-26 NOTE — TELEPHONE ENCOUNTER
PT IS CALLING IN TO INFORM DR REA THAT SHE IS AT THE [facility] WITH PNEUMONIA.    PT CALL BACK  [phone]

## 2021-03-02 NOTE — TELEPHONE ENCOUNTER
Caller: FLORENCIA SALAZAR    Relationship to patient: DAUGHTER IN LAW    Best call back number: [phone]    Concerns or Questions if Applicable: PATIENT WAS IN HOSPITAL FOR pneumonia AND RELEASED YESTERDAY. PATIENT IS GETTING COVID VACCINE THIS MORNING, FAMILY NEEDS TO KNOW IF THIS IS OKAY SINCE SHE IS STILL RECOVERING.  PLEASE ADVISE.

## 2021-03-03 NOTE — TELEPHONE ENCOUNTER
PT: MARYCRUZ SALAZAR    PT'S DAUGHTER IN-LAW FLORENCIA CALLING TO R/S APPT FOR TODAY AS PT JUST GOT OUT HOSPITAL FROM PNEUMONIA AND STILL ISN'T FEELING WELL.  SHE WOULD LIKE TO PUSH OUT TO NEXT WEEK IF OK?     FLORENCIA'S #: [phone]

## 2021-03-04 NOTE — TELEPHONE ENCOUNTER
KAMINI STAUFFER CALLED TO UPDATE MEDICAL INFORMATION ABOUT PATIENT.     PATIENT'S OXYGEN SATURATION WAS IN THE MID 80'S BUT HAS A NEW DIAGNOSIS OF PNEUMONIA. PATIENT WAS IN THE HOSPITAL OVER THE WEEKEND AND WAS RECOMMENDED TO WEAR OXYGEN. PATIENT DID NOT HAVE IT ON AT THE TIME OF VISIT, WAS ADVISED TO WEAR OXYGEN DURING THE DAY.    CONTACT: [phone]

## 2021-03-05 NOTE — TELEPHONE ENCOUNTER
Please call to check on patient.  If she is short of breath and still hypoxic she needs to go to the  ED.

## 2021-03-09 NOTE — PROGRESS NOTES
Subjective     REASON FOR FOLLOW-UP:   1.  Macrocytic anemia secondary to myelodysplastic syndrome with 5 q. minus and chronic kidney disease  2.  Monoclonal gammopathy of undetermined significance                             REQUESTING PHYSICIAN:  Dr. Baugh      History of Present Illness   Ms. Singh is a nikita 80-year-old woman returning today for follow-up of her myelodysplastic syndrome with anemia and borderline neutropenia.  The patient has been receiving Procrit combined with Neupogen on a weekly basis and has had a very good response in the hemoglobin.  She has not required a blood transfusion since soon after adding Neupogen November 2020.    The patient was recently admitted to [facility] for treatment of pneumonia that she reports was aspiration pneumonia.  She is completing antibiotic therapy.  She reports that she did require transfusion 2 units packed red blood cells while in the hospital for hemoglobin 7.9.        Hematology History:  Patient presented as 78-year-old woman for evaluation of anemia.  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  She has stage III chronic kidney disease followed by Dr. Garza of nephrology.  Reviewing her records, the patient has had anemia present since at least 2014 but her hemoglobin has worsened over the past 6 months.  The patient was admitted to the hospital in October 2018 with symptomatic anemia, shortness of breath and lightheadedness.  She was found to have hemoglobin of 7.0.  There was concern for GI blood loss although EGD and colonoscopy performed showed no obvious etiology of blood loss.   She was transfused 7 units of packed red blood cells during the hospital stay.  I do not see any Hemoccult results.  She was previously on Eliquis which was discontinued.  Since discharge in October, the patient has been receiving weekly Procrit 20,000 units in the [facility] at [place], but despite Procrit she has required transfusion on 2 separate occasions.  She is not seeing any bright red blood per rectum or melena.  She complains of fatigue and lightheadedness.    Recent iron profile on 1/17/19 was inconsistent with iron deficiency with an iron saturation 68% and the ferritin was 352.  The red blood cells are macrocytic.  White blood cell and platelet counts have been normal.    The patient was seen in hematology on 1/29/19 and additional evaluation performed.  The reticulocyte count was elevated 3.72% but the haptoglobin and LDH were both normal arguing against a hemolytic process.  B12 and folic acid levels were normal.  Serum protein electrophoresis showed no M spike but the immunofixation identified a small IgA monoclonal protein with lambda specificity; IgA level 544.  Sedimentation rate elevated 58.  A free light chain ratio from the serum was normal 1.64.    The patient was referred for a bone marrow exam performed on 3/6/2019 which showed a cellularity of 35%.  There was erythroid hyperplasia with megaloblastoid changes, normal myeloid maturation, increased megakaryocytes with frequent micro megakaryocytes, scattered lymphoid aggregates.  There were morphologically normal plasma cells increased in #2 8% of the cellularity.  There were no increased blasts.  No increase in iron stores.  Karyotype showed a deletion 5 q. and 19 of 20 cells analyzed.    Patient was initiated on Procrit therapy with continued Red cell transfusion requirements despite maximum dose Procrit.  She attempted to take Revlimid on 2 occasions but developed rash despite dose reductions of Revlimid.    Combination Procrit plus Neupogen initiated 11/11/2020.    Past Medical History:   Diagnosis Date   • Allergic rhinitis    • Anemia    • Anxiety    • Appetite absent    • Arthritis    • Asthma    • Back pain    • Bell's palsy    • Black tarry stools    • Blood in stool    • Chronic fatigue    • CKD (chronic  kidney disease) stage 3, GFR 30-59 ml/min (CMS/[facility])    • Community acquired pneumonia of left lung 10/25/2018   • Cough    • Depression    • Diabetes mellitus (CMS/[facility])     LAST A1C 6   • Diabetic gastroparesis (CMS/[facility]) 2/19/2016   • Difficulty walking    • Excessive urination at night    • Frequent urination    • GERD (gastroesophageal reflux disease)    • GI bleed    • Gout    • H/O blood clots     LEFT LEG 7 OR 8 YEARS AGO   • Heat intolerance    • History of fall 10/2018   • History of prior pregnancies     x8, miscarriage 5   • History of transfusion 11/2018    due to anemia   • Hyperlipidemia    • Hypertension    • Hypothyroidism    • Normal coronary arteries     by cath 2013   • Orthostatic hypotension    • AARON (obstructive sleep apnea)     DOESNT WEAR REGULARLY   • Pneumonia    • PONV (postoperative nausea and vomiting)    • Skin cancer    • Stroke (CMS/[facility])     Several mini-strokes   • TIA (transient ischemic attack)     LAST TIA JULY 2017   • Urination pain    • UTI (urinary tract infection)     Dec 2018 and Jan 2019        Past Surgical History:   Procedure Laterality Date   • BACK SURGERY      HARDWARE   • CHOLECYSTECTOMY      OPEN   • COLONOSCOPY  2011    due for repeat in 2021   • COLONOSCOPY N/A 10/28/2018    Procedure: COLONOSCOPY;  Surgeon: Emmanuel Rogers MD;  Location: [facility] OR;  Service: Gastroenterology   • ENDOSCOPY N/A 10/26/2018    Procedure: ESOPHAGOGASTRODUODENOSCOPY;  Surgeon: Emmanuel Rogers MD;  Location: [facility] OR;  Service: Gastroenterology   • HYSTERECTOMY      PARTIAL    • KNEE SURGERY     • NECK SURGERY     • SPINE SURGERY     • TUMOR REMOVAL Left     Leg   • UPPER GASTROINTESTINAL ENDOSCOPY  2014    gastritis.  done by dr. hastings        Current Outpatient Medications on File Prior to Visit   Medication Sig Dispense Refill   • ACCU-CHEK FASTCLIX LANCETS misc TEST 3-4 TIMES DAILY AS DIRECTED 400 each 3   • ACCU-CHEK SMARTVIEW test strip TEST BLOOD SUGAR  THREE TIMES DAILY OR AS DIRECTED 300 each 3   • acetaminophen (TYLENOL) 325 MG tablet Take 2 tablets by mouth Every 6 (Six) Hours As Needed for Mild Pain . OTC product 40 tablet 0   • atorvastatin (LIPITOR) 10 MG tablet TAKE ONE TABLET BY MOUTH AT BEDTIME 90 tablet 1   • Blood Glucose Monitoring Suppl (ACCU-CHEK KENNETH SMARTVIEW) w/Device kit TEST blood sugar three times daily or as directed 1 kit 0   • colchicine 0.6 MG tablet Take 2 tabs po x 1 then 1 tab 1 hour later 3 tablet 0   • cyclobenzaprine (FLEXERIL) 10 MG tablet TAKE ONE TABLET BY MOUTH THREE TIMES DAILY as needed for muscle spasms 45 tablet 0   • desvenlafaxine (PRISTIQ) 50 MG 24 hr tablet TAKE ONE TABLET BY MOUTH EVERY DAY 90 tablet 1   • Diclofenac Sodium (Voltaren) 1 % gel gel Apply 4 g topically to the appropriate area as directed 4 (Four) Times a Day As Needed (shoulder pain). 400 g 2   • dicyclomine (Bentyl) 10 MG capsule Take 1 cap po q 8 hours prn spasm 60 capsule 0   • diphenhydrAMINE (BENADRYL) 25 mg capsule Take 25 mg by mouth Every 6 (Six) Hours As Needed for Itching.     • Eliquis 5 MG tablet tablet TAKE ONE TABLET BY MOUTH TWICE DAILY 180 tablet 0   • furosemide (LASIX) 20 MG tablet Take one tab daily every day.  Take 1 additional pill daily PRN LE swelling 180 tablet 1   • gabapentin (NEURONTIN) 400 MG capsule TAKE ONE CAPSULE BY MOUTH EVERY MORNING, AT NOON, AND TWO AT BEDTIME 120 capsule 2   • HYDROcodone-acetaminophen (Norco) 5-325 MG per tablet Take 1 tablet by mouth Every 12 (Twelve) Hours As Needed for Severe Pain . 30 tablet 0   • insulin aspart (novoLOG) 100 UNIT/ML injection Inject 5 Units under the skin into the appropriate area as directed 3 (Three) Times a Day With Meals.  12   • Insulin Degludec (Tresiba FlexTouch) 200 UNIT/ML solution pen-injector pen injection Inject 50 Units under the skin into the appropriate area as directed Every Night. 9 mL 11   • levothyroxine (SYNTHROID, LEVOTHROID) 88 MCG tablet TAKE ONE TABLET BY  "MOUTH EVERY DAY 90 tablet 1   • midodrine (PROAMATINE) 2.5 MG tablet Take 2.5 mg by mouth 3 (Three) Times a Day.     • Mirabegron ER (Myrbetriq) 25 MG tablet sustained-release 24 hour 24 hr tablet Take 1 tablet by mouth Daily. 30 tablet 5   • Needle, Disp, (BD DISP NEEDLES) 30G X 1/2\" misc To be used 3 times daily with Novolog Flexpen. 100 each 5   • nitrofurantoin (MACRODANTIN) 100 MG capsule Take 100 mg by mouth Daily.     • nystatin (MYCOSTATIN) 935004 UNIT/GM powder Apply  topically to the appropriate area as directed 2 (Two) Times a Day. 60 g 2   • O2 (OXYGEN) Inhale 2 L/min Every Night. Uses 2L  overnight only     • omeprazole (priLOSEC) 40 MG capsule TAKE ONE CAPSULE BY MOUTH EVERY DAY 90 capsule 1   • ondansetron (ZOFRAN) 8 MG tablet Take 1 tablet by mouth 3 (Three) Times a Day As Needed for Nausea or Vomiting. 30 tablet 5   • phenazopyridine (PYRIDIUM) 200 MG tablet Take 200 mg by mouth 3 (Three) Times a Day.     • potassium chloride 10 MEQ CR tablet TAKE ONE TABLET BY MOUTH EVERY DAY 90 tablet 2   • sennosides-docusate (senna-docusate sodium) 8.6-50 MG per tablet Take 2 tablets by mouth Daily. 60 tablet 3   • UltiCare Mini Pen Needles 31G X 6 MM misc USE TO inject insulins FOUR TIMES DAILY AS DIRECTED 400 each 3   • Ventolin  (90 Base) MCG/ACT inhaler INHALE TWO PUFFS BY MOUTH EVERY 4 HOURS AS NEEDED FOR WHEEZING 18 g 3     Current Facility-Administered Medications on File Prior to Visit   Medication Dose Route Frequency Provider Last Rate Last Admin   • acetaminophen (TYLENOL) tablet 650 mg  650 mg Oral Q6H PRN Elieser Headley MD       • diphenhydrAMINE (BENADRYL) capsule 25 mg  25 mg Oral Once Elieser Headley MD            ALLERGIES:    Allergies   Allergen Reactions   • Baclofen Anxiety     Panic attack, nightmares   • Codeine Itching and Rash   • Lisinopril Cough   • Morphine Hives   • Penicillins Rash     Tolerates cephalosporins         Social History     Socioeconomic History   • Marital " "status:      Spouse name: Not on file   • Number of children: 3   • Years of education: High School   • Highest education level: Not on file   Tobacco Use   • Smoking status: Never Smoker   • Smokeless tobacco: Never Used   • Tobacco comment: CAFFEINE USE: NONE   Substance and Sexual Activity   • Alcohol use: No   • Drug use: No   • Sexual activity: Defer     Comment: EXERCISE - RARELY        Family History   Problem Relation Age of Onset   • Lupus Mother    • Heart failure Mother 59   • Heart disease Other    • Hypertension Other    • Heart attack Father    • Breast cancer Neg Hx         Review of Systems   Constitutional: Positive for fatigue (improved). Negative for appetite change, fever and unexpected weight change.   HENT: Negative for congestion.    Respiratory: Positive for shortness of breath (with exertion). Negative for apnea and cough.    Gastrointestinal: Negative for abdominal pain, blood in stool, nausea and vomiting.   Genitourinary: Negative for dysuria, frequency and urgency.   Musculoskeletal: Positive for arthralgias (stable), back pain (stable) and gait problem (stable). Negative for neck stiffness.   Skin: Negative for rash.   Neurological: Positive for numbness. Negative for dizziness, tremors, speech difficulty and light-headedness.   Hematological: Negative.    Psychiatric/Behavioral: Negative.        Objective     Vitals:    03/09/21 1008   BP: 133/55   Pulse: 86   Resp: 18   Temp: 97.3 °F (36.3 °C)   TempSrc: Infrared   SpO2: 94%   Weight: Comment: cannot get a weight   Height: 157.5 cm (62.01\")   PainSc: 10-Worst pain ever   PainLoc: Neck  Comment: neck and shoulders     Current Status 3/9/2021   ECOG score 2       Physical Exam    CON: pleasant well-developed somewhat chronic ill appearing woman, she is wearing a facemask.  She is seated in a wheelchair.  She is in no acute distress.  HEENT: no icterus, no thrush, moist membranes  NECK: no jvd  LYMPH: No cervical  " lymphadenopathy  CV: RRR, S1S2, no murmur  RESP: Lungs clear to auscultation bilaterally, no wheezing noted.  MUSC: No edema noted.  Poor mobility, and a wheelchair  NEURO: alert and oriented x3, mild global weakness  PSYCH: normal mood and affect  Skin: No induration no petechiae.   Exam is unchanged-3/9/2021  RECENT LABS:  Hematology WBC   Date Value Ref Range Status   02/24/2021 5.41 3.40 - 10.80 10*3/mm3 Final   01/12/2021 5.5 3.4 - 10.8 x10E3/uL Final     RBC   Date Value Ref Range Status   02/24/2021 2.77 (L) 3.77 - 5.28 10*6/mm3 Final   01/12/2021 3.14 (L) 3.77 - 5.28 x10E6/uL Final     Hemoglobin   Date Value Ref Range Status   02/24/2021 8.9 (L) 12.0 - 15.9 g/dL Final     Hematocrit   Date Value Ref Range Status   02/24/2021 27.4 (L) 34.0 - 46.6 % Final     Platelets   Date Value Ref Range Status   02/24/2021 294 140 - 450 10*3/mm3 Final          Assessment/Plan     1. Macrocytic anemia secondary to myelodysplastic syndrome with 5 q. 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  In addition, the patient has chronic kidney disease, stage III.      Patient was initiated on Procrit therapy with continued red cell transfusion requirements despite maximum dose Procrit.  She attempted to take Revlimid on 2 occasions but developed rash despite dose reductions of Revlimid.    Patient initiated Procrit combined with Neupogen weekly on 11/11/2020.  She has had a very good partial response with decrease transfusion requirements and improved ANC.  We will continue weekly CBC and Procrit/Neupogen.      She requires transfusion for hemoglobin 8.0 or less and receives Lasix between units of blood.        2.  Monoclonal gammopathy of undetermined significance: The patient's bone marrow showed slight increase in plasma cells 8% of the cellularity but normal in morphology.  She has an IgA monoclonality on her immunofixation but no measurable M spike and a normal free  light chain ratio.   Repeat studies 5/16/2019 showed a stable IgA 509 with a normal light chain ratio, no M spike.  Studies performed 10/24/2019 show stable IgA at 491, no M spike with mildly increased kappa/lambda light chain ratio related to her CKD.  Repeat studies 9/16/2020 stable.  Repeat studies 12-20 showed no measurable M spike, free light chain ratio 2.46, immunofixation IgA lambda (gA level 433)-all stable  I will repeat her SPEP, RANJANA, free light chain ratio at her 3-month follow-up visit.    3.  Chronic kidney disease, stage III which contributes to anemia.      4.  Transfusional iron overload.  The patient has poor performance status but low-grade MDS and may benefit from iron chelation but complicated by her CKD.  Iron levels pending today.    5.  Chronic pain.  Patient's pain in shoulders is chronic in nature.

## 2021-03-09 NOTE — PROGRESS NOTES
Joya Singh is a 80 y.o. female, who presents with a chief complaint of   Chief Complaint   Patient presents with   • Pneumonia     d/c from [facility] on 3/1/2021-finished Erythromycin today           HPI   Pt here with family member for follow up.  She was admitted to [facility] 2/25-3/1.  There was concern for aspiration pneumonia.  She had been having issues with with cough and choking.  A swallow study was ordered on 2/24 and she hasnt been able to have this yet.  I have the H&P but not he discharge summary.  The pt says she was given IV antibiotics and just finished po abx today.  She was transfused with prbc's x 2 while in the hospital.  Pt feels some better but is still tired.  Her main complaint is her cough today.  She saw dr. Headley this am.  Hgb this am 8.5  Prior to being sick she only used her oxygen at night.  Pt had some low oxygen after her hospitalization  She has some home oxygen that she was able to use.    She says she chokes on everything.  She related choking on pork rinds right before she got sick.  She feels like mucinex is stuck in her chest.  She took mucinex but it didn't help much.        The following portions of the patient's history were reviewed and updated as appropriate: allergies, current medications, past family history, past medical history, past social history, past surgical history and problem list.    Current outpatient and discharge medications have been reconciled for the patient.  Reviewed by: Chari Mercado MD      Allergies: Baclofen, Codeine, Lisinopril, Morphine, and Penicillins    Review of Systems   Constitutional: Negative.    HENT: Negative.    Eyes: Negative.    Respiratory: Negative.    Cardiovascular: Negative.    Gastrointestinal: Negative.    Endocrine: Negative.    Genitourinary: Negative.    Musculoskeletal: Negative.    Skin: Negative.    Allergic/Immunologic: Negative.    Neurological: Negative.    Hematological: Negative.     Psychiatric/Behavioral: Negative.    All other systems reviewed and are negative.            Wt Readings from Last 3 Encounters:   03/09/21 90.7 kg (200 lb)   02/24/21 91.6 kg (202 lb)   01/22/21 92.1 kg (203 lb)     Temp Readings from Last 3 Encounters:   03/09/21 96.9 °F (36.1 °C) (Temporal)   03/09/21 97.3 °F (36.3 °C) (Infrared)   02/24/21 97.1 °F (36.2 °C) (Temporal)     BP Readings from Last 3 Encounters:   03/09/21 112/58   03/09/21 133/55   02/24/21 130/78     Pulse Readings from Last 3 Encounters:   03/09/21 68   03/09/21 86   02/24/21 79     Body mass index is 36.58 kg/m².  SpO2 Readings from Last 3 Encounters:   03/09/21 98%   03/09/21 94%   02/24/21 93%          Physical Exam  Vitals and nursing note reviewed.   Constitutional:       General: She is not in acute distress.     Appearance: She is well-developed. She is obese.   HENT:      Head: Normocephalic and atraumatic.      Right Ear: External ear normal.      Left Ear: External ear normal.      Nose: Nose normal.   Eyes:      Conjunctiva/sclera: Conjunctivae normal.      Pupils: Pupils are equal, round, and reactive to light.   Cardiovascular:      Rate and Rhythm: Normal rate and regular rhythm.      Heart sounds: Normal heart sounds.   Pulmonary:      Effort: Pulmonary effort is normal. No respiratory distress.      Breath sounds: Normal breath sounds. No wheezing.   Musculoskeletal:         General: Normal range of motion.      Cervical back: Normal range of motion and neck supple.      Comments: Normal gait   Skin:     General: Skin is warm and dry.      Capillary Refill: Capillary refill takes less than 2 seconds.   Neurological:      General: No focal deficit present.      Mental Status: She is alert and oriented to person, place, and time.   Psychiatric:         Mood and Affect: Mood normal.         Behavior: Behavior normal.         Thought Content: Thought content normal.         Judgment: Judgment normal.         Results for orders placed  or performed in visit on 03/09/21   Iron Profile    Specimen: Blood   Result Value Ref Range    Iron 139 37 - 145 mcg/dL    Iron Saturation      UIBC <16 (L) 112 - 346 mcg/dL    TIBC     Ferritin    Specimen: Blood   Result Value Ref Range    Ferritin 1,775.00 (H) 13.00 - 150.00 ng/mL   CBC Auto Differential    Specimen: Blood   Result Value Ref Range    WBC 5.49 3.40 - 10.80 10*3/mm3    RBC 2.72 (L) 3.77 - 5.28 10*6/mm3    Hemoglobin 8.5 (L) 12.0 - 15.9 g/dL    Hematocrit 27.4 (L) 34.0 - 46.6 %    .7 (H) 79.0 - 97.0 fL    MCH 31.3 26.6 - 33.0 pg    MCHC 31.0 (L) 31.5 - 35.7 g/dL    RDW 21.1 (H) 12.3 - 15.4 %    RDW-SD 77.1 (H) 37.0 - 54.0 fl    MPV 11.8 6.0 - 12.0 fL    Platelets 310 140 - 450 10*3/mm3    Neutrophil % 61.9 42.7 - 76.0 %    Lymphocyte % 24.2 19.6 - 45.3 %    Monocyte % 3.3 (L) 5.0 - 12.0 %    Eosinophil % 7.5 (H) 0.3 - 6.2 %    Basophil % 2.4 (H) 0.0 - 1.5 %    Immature Grans % 0.7 (H) 0.0 - 0.5 %    Neutrophils, Absolute 3.40 1.70 - 7.00 10*3/mm3    Lymphocytes, Absolute 1.33 0.70 - 3.10 10*3/mm3    Monocytes, Absolute 0.18 0.10 - 0.90 10*3/mm3    Eosinophils, Absolute 0.41 (H) 0.00 - 0.40 10*3/mm3    Basophils, Absolute 0.13 0.00 - 0.20 10*3/mm3    Immature Grans, Absolute 0.04 0.00 - 0.05 10*3/mm3     *Note: Due to a large number of results and/or encounters for the requested time period, some results have not been displayed. A complete set of results can be found in Results Review.     Result Review :   The following data was reviewed by: Chari Mercado MD on 03/09/2021:    Data reviewed: available records from [facility] reviewed.  requested discharge summary.           Assessment and Plan    Diagnoses and all orders for this visit:    1. Aspiration pneumonia of right upper lobe, unspecified aspiration pneumonia type (CMS/HCC) (Primary) - will obtain d/c summary.  Pt done with abx.  Cbc earlier today ok.  Needs swallow study as already ordered.  discussed prevention of aspiration  pneumonia.    2. Chronic right shoulder pain  -     HYDROcodone-acetaminophen (Norco) 5-325 MG per tablet; Take 1 tablet by mouth Every 12 (Twelve) Hours As Needed for Severe Pain .  Dispense: 30 tablet; Refill: 0    3. Wheezing  -     Nebulizer device; 1 each Take As Directed.  Dispense: 1 each; Refill: 0  -     albuterol (PROVENTIL) (2.5 MG/3ML) 0.083% nebulizer solution; Take 2.5 mg by nebulization Every 4 (Four) Hours As Needed for Wheezing.  Dispense: 100 each; Refill: 2         Patient's Body mass index is 36.58 kg/m². BMI is above normal parameters. Recommendations include: none (medical contraindication).       I spent 40 minutes caring for Joya on this date of service. This time includes time spent by me in the following activities:preparing for the visit, reviewing tests, obtaining and/or reviewing a separately obtained history, performing a medically appropriate examination and/or evaluation , counseling and educating the patient/family/caregiver, ordering medications, tests, or procedures, documenting information in the medical record and care coordination      Outpatient Medications Prior to Visit   Medication Sig Dispense Refill   • ACCU-CHEK FASTCLIX LANCETS misc TEST 3-4 TIMES DAILY AS DIRECTED 400 each 3   • ACCU-CHEK SMARTVIEW test strip TEST BLOOD SUGAR THREE TIMES DAILY OR AS DIRECTED 300 each 3   • acetaminophen (TYLENOL) 325 MG tablet Take 2 tablets by mouth Every 6 (Six) Hours As Needed for Mild Pain . OTC product 40 tablet 0   • atorvastatin (LIPITOR) 10 MG tablet TAKE ONE TABLET BY MOUTH AT BEDTIME 90 tablet 1   • Blood Glucose Monitoring Suppl (ACCU-CHEK KENNETH SMARTVIEW) w/Device kit TEST blood sugar three times daily or as directed 1 kit 0   • colchicine 0.6 MG tablet Take 2 tabs po x 1 then 1 tab 1 hour later 3 tablet 0   • cyclobenzaprine (FLEXERIL) 10 MG tablet TAKE ONE TABLET BY MOUTH THREE TIMES DAILY as needed for muscle spasms 45 tablet 0   • desvenlafaxine (PRISTIQ) 50 MG 24 hr  "tablet TAKE ONE TABLET BY MOUTH EVERY DAY 90 tablet 1   • Diclofenac Sodium (Voltaren) 1 % gel gel Apply 4 g topically to the appropriate area as directed 4 (Four) Times a Day As Needed (shoulder pain). 400 g 2   • dicyclomine (Bentyl) 10 MG capsule Take 1 cap po q 8 hours prn spasm 60 capsule 0   • diphenhydrAMINE (BENADRYL) 25 mg capsule Take 25 mg by mouth Every 6 (Six) Hours As Needed for Itching.     • Eliquis 5 MG tablet tablet TAKE ONE TABLET BY MOUTH TWICE DAILY 180 tablet 0   • furosemide (LASIX) 20 MG tablet Take one tab daily every day.  Take 1 additional pill daily PRN LE swelling 180 tablet 1   • gabapentin (NEURONTIN) 400 MG capsule TAKE ONE CAPSULE BY MOUTH EVERY MORNING, AT NOON, AND TWO AT BEDTIME 120 capsule 2   • insulin aspart (novoLOG) 100 UNIT/ML injection Inject 5 Units under the skin into the appropriate area as directed 3 (Three) Times a Day With Meals.  12   • Insulin Degludec (Tresiba FlexTouch) 200 UNIT/ML solution pen-injector pen injection Inject 50 Units under the skin into the appropriate area as directed Every Night. 9 mL 11   • levothyroxine (SYNTHROID, LEVOTHROID) 88 MCG tablet TAKE ONE TABLET BY MOUTH EVERY DAY 90 tablet 1   • midodrine (PROAMATINE) 2.5 MG tablet Take 2.5 mg by mouth 3 (Three) Times a Day.     • Mirabegron ER (Myrbetriq) 25 MG tablet sustained-release 24 hour 24 hr tablet Take 1 tablet by mouth Daily. 30 tablet 5   • Needle, Disp, (BD DISP NEEDLES) 30G X 1/2\" misc To be used 3 times daily with Novolog Flexpen. 100 each 5   • nitrofurantoin (MACRODANTIN) 100 MG capsule Take 100 mg by mouth Daily.     • nystatin (MYCOSTATIN) 590976 UNIT/GM powder Apply  topically to the appropriate area as directed 2 (Two) Times a Day. 60 g 2   • O2 (OXYGEN) Inhale 2 L/min Every Night. Uses 2L  overnight only     • omeprazole (priLOSEC) 40 MG capsule TAKE ONE CAPSULE BY MOUTH EVERY DAY 90 capsule 1   • ondansetron (ZOFRAN) 8 MG tablet Take 1 tablet by mouth 3 (Three) Times a Day As " Needed for Nausea or Vomiting. 30 tablet 5   • phenazopyridine (PYRIDIUM) 200 MG tablet Take 200 mg by mouth 3 (Three) Times a Day.     • potassium chloride 10 MEQ CR tablet TAKE ONE TABLET BY MOUTH EVERY DAY 90 tablet 2   • sennosides-docusate (senna-docusate sodium) 8.6-50 MG per tablet Take 2 tablets by mouth Daily. 60 tablet 3   • UltiCare Mini Pen Needles 31G X 6 MM misc USE TO inject insulins FOUR TIMES DAILY AS DIRECTED 400 each 3   • Ventolin  (90 Base) MCG/ACT inhaler INHALE TWO PUFFS BY MOUTH EVERY 4 HOURS AS NEEDED FOR WHEEZING 18 g 3   • HYDROcodone-acetaminophen (Norco) 5-325 MG per tablet Take 1 tablet by mouth Every 12 (Twelve) Hours As Needed for Severe Pain . 30 tablet 0     Facility-Administered Medications Prior to Visit   Medication Dose Route Frequency Provider Last Rate Last Admin   • acetaminophen (TYLENOL) tablet 650 mg  650 mg Oral Q6H PRN Elieser Headley MD       • diphenhydrAMINE (BENADRYL) capsule 25 mg  25 mg Oral Once Elieser Headley MD         New Medications Ordered This Visit   Medications   • HYDROcodone-acetaminophen (Norco) 5-325 MG per tablet     Sig: Take 1 tablet by mouth Every 12 (Twelve) Hours As Needed for Severe Pain .     Dispense:  30 tablet     Refill:  0   • Nebulizer device     Si each Take As Directed.     Dispense:  1 each     Refill:  0   • albuterol (PROVENTIL) (2.5 MG/3ML) 0.083% nebulizer solution     Sig: Take 2.5 mg by nebulization Every 4 (Four) Hours As Needed for Wheezing.     Dispense:  100 each     Refill:  2     [unfilled]  Medications Discontinued During This Encounter   Medication Reason   • HYDROcodone-acetaminophen (Norco) 5-325 MG per tablet Reorder         Return in about 4 weeks (around 2021) for Recheck.    Patient was given instructions and counseling regarding her condition or for health maintenance advice. Please see specific information pulled into the AVS if appropriate.

## 2021-03-12 NOTE — TELEPHONE ENCOUNTER
Caller: CARL BRUNER     Relationship: [facility]     Best call back number: [phone]    What orders are you requesting (i.e. lab or imaging): SPEECH THERAPY    In what timeframe would the patient need to come in: EFFECTIVE THE WEEK OF THE 15TH    Where will you receive your lab/imaging services: [facility] HOME     Additional notes: THE PATIENT IS HAVING TROUBLE SWALLOWING AND [facility] WOULD LIKE FOR SOMEONE  TO COME AND EVALUATE THE PATIENT

## 2021-03-17 NOTE — NURSING NOTE
Pt called the office at 2:55pm today stating her home health nurse is currently with her and is concerned about her. The pt explained that she became dizzy after leaving our office today and returning home. Current BP is 142/72; HR is 78. Nurse s/w Dr. Headley who does not feel today's hgb of 8.2 would cause dizziness; the pt should receive blood transfusions when the hgb is less than 8.0. The pt should continue to monitor her symptoms and call our office or go to the ER for worsening symptoms. The pt was informed of the above and v/u.

## 2021-03-18 NOTE — TELEPHONE ENCOUNTER
NAHUM WITH [facility] CALLED IN STATING SHE HAS CRACKLES TO HER LEFT LOWER LUNG AND SHE'S HAVING INCREASED FATIGUE WEAKNESS AND COUGH.    NAHUM WANTS TO KNOW IF IT WOULD BE OK FOR THE PATIENT TO HAVE A MOBILE X-RAY, GET LABS DONE AND A UA?    BEST CALL BACK # [phone]

## 2021-03-24 NOTE — NURSING NOTE
HGB 8.1 today.  Pt feeling extreme fatigue and shortness of air with minimal exertion.  Pt requesting a blood transfusion since she feels so bad.  Per Dr Headley, two units PRBC's ordered.  T&S drawn on patient , blood bank armband placed on patient.  Pt scheduled for blood tomorrow in [facility] at 9 am.

## 2021-03-25 NOTE — NURSING NOTE
1515 Patient in [facility] today for scheduled blood transfusion, patient received 2 units PRBC's without complication. Patient in bed during transfusion, up to BR prn, ate lunch here. Patient denies c/o, discharged via w/c in stable condition.

## 2021-03-25 NOTE — PATIENT INSTRUCTIONS
"  Call [employer] at [phone]   [email or web address]-happens-[email or web address]-[email or web address]-[email or web address]-[email or web address]\"> [email or web address]-happens-to-[email or web address]-blood/blood-transfusions/risks-complications.html\">   Blood Transfusion, Adult, Care After  This sheet gives you information about how to care for yourself after your procedure. Your health care provider may also give you more specific instructions. If you have problems or questions, contact your health care provider.  What can I expect after the procedure?  After the procedure, it is common to have:  · Bruising and soreness where the IV was inserted.  · A fever or chills on the day of the procedure. This may be your body's response to the new blood cells received.  · A headache.  Follow these instructions at home:  IV insertion site care         · Follow instructions from your health care provider about how to take care of your IV insertion site. Make sure you:  ? Wash your hands with soap and water before and after you change your bandage (dressing). If soap and water are not available, use hand .  ? Change your dressing as told by your health care provider.  · Check your IV insertion site every day for signs of infection. Check for:  ? Redness, swelling, or pain.  ? Bleeding from the site.  ? Warmth.  ? Pus or a bad smell.  General instructions  · Take over-the-counter and prescription medicines only as told by your health care provider.  · Rest as told by your health care provider.  · Return to your normal activities as told by your health care provider.  · Keep all follow-up visits as told by your health care provider. This is important.  Contact a health care provider if:  · You have itching or red, swollen areas of skin (hives).  · You feel anxious.  · You feel weak after doing your normal activities.  · You have redness, " "swelling, warmth, or pain around the IV insertion site.  · You have blood coming from the IV insertion site that does not stop with pressure.  · You have pus or a bad smell coming from your IV insertion site.  Get help right away if:  · You have symptoms of a serious allergic or immune system reaction, including:  ? Trouble breathing or shortness of breath.  ? Swelling of the face or feeling flushed.  ? Fever or chills.  ? Pain in the head, back, or chest.  ? Dark urine or blood in the urine.  ? Widespread rash.  ? Fast heartbeat.  ? Feeling dizzy or light-headed.  If you receive your blood transfusion in an outpatient setting, you will be told whom to contact to report any reactions.  These symptoms may represent a serious problem that is an emergency. Do not wait to see if the symptoms will go away. Get medical help right away. Call your local emergency services (911 in the [place] Do not drive yourself to the hospital.  Summary  · Bruising and tenderness around the IV insertion site are common.  · Check your IV insertion site every day for signs of infection.  · Rest as told by your health care provider. Return to your normal activities as told by your health care provider.  · Get help right away for symptoms of a serious allergic or immune system reaction to blood transfusion.  This information is not intended to replace advice given to you by your health care provider. Make sure you discuss any questions you have with your health care provider.  Document Revised: 06/11/2020 Document Reviewed: 06/11/2020  [employer] Patient Education © 2021 [employer]   if you have any problems or concerns.    We know you have a Choice in healthcare and appreciate you using [facility].  Our purpose is to provide you \"Excellent Care\".  We hope that you will always choose us in the future and continue to recommend us to your family and friends.              "

## 2021-03-29 NOTE — TELEPHONE ENCOUNTER
EAN WITH [employer] CALLED AND STATED THAT SHE NEES THE CERTIFICATE OF MEDICAL NECESSITY FAXED BACK TO HER REGARDING THE DIABETIC SHOES. EAN STATES THAT SHE HAS FAXED THE PAPERWORK TWICE, ONCE ON 3/16, AND AGAIN ON 3/25. EAN STATES THEY ARE UNABLE TO DISPENSE THE SHOES TO THE PATIENT WITHOUT THE COMPLETED CERTIFICATE OF NECESSITY.    [employer] FAX: [phone]  [employer] PH: [phone]

## 2021-03-29 NOTE — MBS/VFSS/FEES
Outpatient Speech Language Pathology   Adult Swallow Initial Evaluation   Modified Barium Swallow Study  VICKY Sharma     Patient Name: Joya Singh  : 1940  MRN: [id number]  Today's Date: 3/29/2021         Visit Date: 2021   Patient Active Problem List   Diagnosis   • Deep vein thrombosis of lower extremity (CMS/HCC)   • Arthritis   • Chronic back pain   • Chronic anemia   • Anxiety and depression   • Type 2 diabetes mellitus with neurologic complication (CMS/HCC)   • Brittle diabetes mellitus (CMS/HCC)   • Hyperlipidemia   • Hypertension   • Insomnia with sleep apnea   • Obstructive sleep apnea syndrome   • Peripheral neuropathy   • Diabetic gastroparesis (CMS/HCC)   • Primary localized osteoarthrosis of left shoulder region   • Rotator cuff tendonitis   • Acquired hypothyroidism   • Anxiety   • Gastroesophageal reflux disease   • History of biliary T-tube placement   • 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)   • Complex tear of medial meniscus of right knee as current injury   • Insufficiency fracture of tibia   • Primary osteoarthritis of left knee   • Orthostatic hypotension   • Tendinitis of right rotator cuff   • AC joint arthropathy   • Subacromial impingement of right shoulder   • Right carpal tunnel syndrome   • Anemia requiring transfusions   • Monoclonal gammopathy of unknown significance (MGUS)   • Myelodysplastic syndrome with 5q deletion (CMS/HCC)   • History of deep venous thrombosis (DVT) of distal vein of left lower extremity   • Moderate episode of recurrent major depressive disorder (CMS/HCC)   • Chronic diastolic CHF (congestive heart failure) (CMS/HCC)   • Knee pain   • Primary osteoarthritis of right knee   • Morbidly obese (CMS/HCC)   • Iron overload, transfusional   • Confusion   • Syncope   • Uncontrolled type 2 diabetes mellitus with hyperglycemia (CMS/HCC)   • Lateral epicondylitis, left elbow        Past Medical History:   Diagnosis Date   • Allergic rhinitis     • Anemia    • Anxiety    • Appetite absent    • Arthritis    • Asthma    • Back pain    • Bell's palsy    • Black tarry stools    • Blood in stool    • Chronic fatigue    • CKD (chronic kidney disease) stage 3, GFR 30-59 ml/min (CMS/[facility])    • Community acquired pneumonia of left lung 10/25/2018   • Cough    • Depression    • Diabetes mellitus (CMS/[facility])     LAST A1C 6   • Diabetic gastroparesis (CMS/[facility]) 2/19/2016   • Difficulty walking    • Excessive urination at night    • Frequent urination    • GERD (gastroesophageal reflux disease)    • GI bleed    • Gout    • H/O blood clots     LEFT LEG 7 OR 8 YEARS AGO   • Heat intolerance    • History of fall 10/2018   • History of prior pregnancies     x8, miscarriage 5   • History of transfusion 11/2018    due to anemia   • Hyperlipidemia    • Hypertension    • Hypothyroidism    • Normal coronary arteries     by cath 2013   • Orthostatic hypotension    • AARON (obstructive sleep apnea)     DOESNT WEAR REGULARLY   • Pneumonia    • PONV (postoperative nausea and vomiting)    • Skin cancer    • Stroke (CMS/[facility])     Several mini-strokes   • TIA (transient ischemic attack)     LAST TIA JULY 2017   • Urination pain    • UTI (urinary tract infection)     Dec 2018 and Jan 2019        Past Surgical History:   Procedure Laterality Date   • BACK SURGERY      HARDWARE   • CHOLECYSTECTOMY      OPEN   • COLONOSCOPY  2011    due for repeat in 2021   • COLONOSCOPY N/A 10/28/2018    Procedure: COLONOSCOPY;  Surgeon: Emmanuel Rogers MD;  Location: [facility] OR;  Service: Gastroenterology   • ENDOSCOPY N/A 10/26/2018    Procedure: ESOPHAGOGASTRODUODENOSCOPY;  Surgeon: Emmanuel Rogers MD;  Location: [facility] OR;  Service: Gastroenterology   • HYSTERECTOMY      PARTIAL    • KNEE SURGERY     • NECK SURGERY     • SPINE SURGERY     • TUMOR REMOVAL Left     Leg   • UPPER GASTROINTESTINAL ENDOSCOPY  2014    gastritis.  done by dr. hastings         Visit Dx:     ICD-10-CM ICD-9-CM    1. Chronic cough  R05 786.2   2. Choking, initial encounter  T17.308A 933.1           SLP Adult Swallow Evaluation     Row Name 03/29/21 1053       Rehab Evaluation    Document Type  evaluation  -AD    Subjective Information  complains of choking on foods  -AD    Patient Observations  alert;cooperative;agree to therapy  -AD    Patient/Family/Caregiver Comments/Observations  Pt was seen both sitting upright in a chair in the left lateral position and upright standing in an AP position.  -AD    Care Plan Review  evaluation/treatment results reviewed;risks/benefits reviewed;current/potential barriers reviewed  -AD    Patient Effort  good  -AD    Symptoms Noted During/After Treatment  none  -AD       General Information    Patient Profile Reviewed  yes  -AD    Pertinent History Of Current Problem  Pt is a 81 y/o female referred for a modified barium swallow study due to complaints of choking and recent hospitalization for presumed aspiration pneumonia. ([facility] from 2/25/21 to 3/1/21). Pt with hx of complaints of choking and coughing with food and drink for at least the last 5 years. Hx of esophagitis, Rt Bell's Palsy with residual deficits and chronic anemia. Pt reported to her physician that she got choked on a pork rind and that food and pills hang up in her throat. Pt indicates that they hang up in the area at the pharyngoesophageal junction. Hx of C-spine surgery with hardware present.   -AD    Current Method of Nutrition  regular textures;thin liquids  -AD    Precautions/Limitations, Vision  WFL;for purposes of eval  -AD    Precautions/Limitations, Hearing  WFL;for purposes of eval  -AD    Prior Level of Function-Communication  WFL;other (see comments) Difficulty with hx noted.  -AD    Prior Level of Function-Swallowing  no diet consistency restrictions;regular textures;thin liquids  -AD    Plans/Goals Discussed with  patient;agreed upon  -AD    Barriers to Rehab  previous functional deficit  -AD     Patient's Goals for Discharge  eat/drink without coughing/choking eat and drink w/o food sticking  -AD       Pain    Additional Documentation  -- no current pain reported  -AD       Oral Motor Structure and Function    Oral Lesions or Structural Abnormalities and/or variants  none  -AD    Dentition Assessment  natural, present and adequate;missing teeth missing lower molars both right and left  -AD    Secretion Management  WNL/WFL  -AD    Mucosal Quality  moist, healthy  -AD    Volitional Swallow  WFL  -AD    Volitional Cough  WFL  -AD       Oral Musculature and Cranial Nerve Assessment    Oral Motor General Assessment  oral labial or buccal impairment;lingual impairment  -AD    Oral Labial or Buccal Impairment, Detail, Cranial Nerve VII (Facial):  right labial droop chronic from prior Bell's Palsy  -AD    Lingual Impairment, Detail. Cranial Nerves IX, XII (Glossopharyngeal and Hypoglossal)  reduced strength;bilaterally  -AD       General Eating/Swallowing Observations    Respiratory Support Currently in Use  room air  -AD    Eating/Swallowing Skills  fed by SLP  -AD    Positioning During Eating  upright 90 degree;upright in chair;other (see comments) standing AP  -AD       MBS/VFSS    Utensils Used  spoon;cup;straw  -AD    Consistencies Trialed  regular textures;pureed;thin liquids;nectar/syrup-thick liquids;honey-thick liquids;barium pill  -AD       MBS/VFSS Interpretation    Oral Prep Phase  impaired oral phase of swallowing  -AD    Oral Transit Phase  impaired  -AD    Oral Residue  impaired  -AD    VFSS Summary  Pt presents with a mild oropharyngeal dysphagia with lingual weakness resulting in tongue pumping and piecemeal oral transit. Pt with delay of swallow with all consistencies pooling in the valleculae prior to the swallow and thins to the pyriforms prior to the swallow. Transient penetration noted on spoon size trials of nectar and one spoon size trial  and single cup drink of thins. All appeared to be  spontaneously expelled and no aspiration was viewed. Pt with mild vallecular residue on nectar thick that was spontaneously cleared with a subsequent swallow. Pt demonstrates spontaneous use of 3 second prep and effortful swallow on trials of thins which increased bolus control and vestibular closure prior to the swallow. See radiologist's report regarding barium tablet.   -AD    Oral Phase, Comment  Pt presents with a mild oral phase dysphagia with  tongue pumping piecemeal transit of nectar thick and decreased bolus control of thins. This is all resulting from lingual weakness and decreased tongue control.   -AD       Oral Preparatory Phase    Oral Preparatory Phase  prolonged manipulation  -AD    Prolonged Manipulation  regular textures;other (see comments) limited dentition/molars  -AD    Oral Preparatory Phase, Comment  Pt with increased prep time/mastication of solids due to limited dentition, primarily molars in the lower jaw. Able to masticate with her front teeth and tongue adequately.   -AD       Oral Transit Phase    Impaired Oral Transit Phase  tongue pumping;piecemeal oral transit  -AD    Tongue Pumping  honey-thick liquids;pudding/puree;mixed consistency;secondary to reduced lingual control  -AD    Piecemeal Oral Transit  nectar-thick liquids;secondary to reduced lingual control  -AD    Oral Transit Phase, Comment  Pt presents with mild oral transit deficits with tongue pumping noted on thicker consistencies and decreased bolus control on thins. All due to lingual weakness.   -AD       Oral Residue    Impaired Oral Residue  lingual residue  -AD    Lingual Residue  nectar-thick liquids;honey-thick liquids;pudding/puree;secondary to reduced lingual strength  -AD    Response to Oral Residue  cleared residue;with spontaneous subsequent swallow  -AD    Oral Residue, Comment  Pt with mild oral residue on the tongue surface and holding of material in the front of the mouth on nectar thick. Able to  spontaneously clear all with a subsequent swallow. No oral residue after the second swallow. One episode of oral residue on the back of the tongue on 1/3 trials of nectar thick that was also spontaneously cleared.   -AD       Initiation of Pharyngeal Swallow    Initiation of Pharyngeal Swallow  bolus in valleculae;bolus in pyriform sinuses  -AD    Pharyngeal Phase  impaired pharyngeal phase of swallowing  -AD    Penetration During the Swallow  thin liquids;nectar-thick liquids;secondary to delayed swallow initiation or mistiming;secondary to reduced vestibular closure  -AD    Response to Penetration  transient;no response  -AD    Rosenbek's Scale  thin:;nectar:;2--->level 2;honey:;pudding/puree:;regular textures:;1--->level 1  -AD    Pharyngeal Residue  nectar-thick liquids;valleculae;other (see comments) suspected spill from oral cavity  -AD    Response to Residue  cleared residue with spontaneous subsequent swallow  -AD    Pharyngeal Phase, Comment  Pt presents with a mild pharyngeal dysphagia with greatest deficit of delay of swallow. Pt with pooling of all material to the valleculae prior to initiating the swallow. Pt with pooling of thins to the pyriforms on spoon size trials. Transient penetration of nectar thick and thin liquids (2/3 trials of nectar both from spoon and 2/4 trials of thins, one from spoon and one from single cup drink). No aspiration was viewed on any consistency or trial. Mistiming results in penetration due to decreased vestibular closure during the swallow. Pt able to demonstrate spontaneous closure of the vestibule on cup and straw drinks prior to the swallow using effort. Mild pharyngeal residue in the vallecular on nectar thick, but able to spontaneously clear with a repeat swallow. A barium pill was also given with no residue in the pharynx. See radiologist's report. Pt also noted to cough and report choking on nectar, puree and solid during the swallow with no contrast material viewed  in the pharynx or larynx at that time.   -AD       Esophageal Phase    Esophageal Phase  see radiology report for further details  -AD    Esophageal Phase, Comment  Provided patient with a barium tablet due to complaints of pills sticking in her throat. Delayed oral transit with having to take two drinks to clear from the oral cavity.See radiologist's report. No hanging or sticking in the pharynx during the swallow noted.   -AD       Clinical Impression    SLP Swallowing Diagnosis  mild;oral dysphagia;pharyngeal dysphagia  -AD    Functional Impact  risk of aspiration/pneumonia minimal   -AD    Rehab Potential/Prognosis, Swallowing  good, to achieve stated therapy goals  -AD    Swallow Criteria for Skilled Therapeutic Interventions Met  other (see comments);demonstrates skilled criteria per Home Health SLP  -AD       Recommendations    Therapy Frequency (Swallow)  evaluation only  -AD    SLP Diet Recommendation  regular textures;thin liquids  -AD    Recommended Precautions and Strategies  upright posture during/after eating;small bites of food and sips of liquid;hard swallow with each bite or sip;3 second prep;general aspiration precautions;reflux precautions  -AD    Oral Care Recommendations  Oral Care before breakfast, after meals and PRN;Toothbrush  -AD    SLP Rec. for Method of Medication Administration  meds whole;with thin liquids;as tolerated  -AD    Monitor for Signs of Aspiration  no;notify SLP if any concerns  -AD    Anticipated Discharge Disposition (SLP)  home with home health  -AD    Demonstrates Need for Referral to Another Service  gastroenterology;other (see comments) due to c/o food/ba tablet hanging/sticking.  -AD      User Key  (r) = Recorded By, (t) = Taken By, (c) = Cosigned By    Initials Name Provider Type    AD Dahiana Villa MS CCC-SLP Speech and Language Pathologist            OP SLP Education     Row Name 03/29/21 1056       Education    Barriers to Learning  Other (comment0 Some  decreased recall of information in past.  -AD    Action Taken to Address Barriers  Further education/therapy.  -AD    Education Provided  Described results of evaluation;Patient expressed understanding of evaluation  -AD    Assessed  Learning needs;Learning motivation;Learning preferences;Learning readiness  -AD    Learning Motivation  Moderate  -AD    Learning Method  Explanation  -AD    Teaching Response  Verbalized understanding;Reinforcement needed  -AD    Education Comments  Results of MBS and recommendations verbally reviewed with patient. Reinforcement needed.   -AD      User Key  (r) = Recorded By, (t) = Taken By, (c) = Cosigned By    Initials Name Effective Dates    AD Dahiana Villa MS CCC-SLP 08/09/20 -             OP SLP Assessment/Plan - 03/29/21 1053        SLP Assessment    Functional Problems  Swallowing   -AD    Impact on Function: Swallowing  Risk of aspiration;Risk of pneumonia   -AD    Clinical Impression: Swallowing  Mild:;oropharyngeal phase dysphagia   -AD    Functional Problems Comment  Lingual weakness; delay of swallowing   -AD    Clinical Impression Comments  Pt with lingual weakness affecting bolus control and ral transit. Delay of swallow resulting in pooling in the pharynx prior to the swallow placing pt at increased risk of aspiration. No aspiration viewed. Esophageal complaints as well.    -AD    Please refer to paper survey for additional self-reported information  No   -AD    Please refer to items scanned into chart for additional diagnostic informaiton and handouts as provided by clinician  No   -AD    SLP Diagnosis  Mild oropharyngeal dysphagia w/esophageal componenet   -AD    Prognosis  Good (comment)    with therapy, education and teaching of HEP.  -AD       SLP Plan    Frequency  evaluation only   -AD    Plan Comments  Recommend further therapy via home health for lingual exercises and education regarding risks/precautions. GI consult as well due to findings with barium  tablet.    -AD      User Key  (r) = Recorded By, (t) = Taken By, (c) = Cosigned By    Initials Name Provider Type    AD Dahiana Villa, MS CCC-SLP Speech and Language Pathologist             Time Calculation:   SLP Start Time: 1053  SLP Stop Time: 1303 (set up, direct, interpretation and results)  SLP Time Calculation (min): 130 min  SLP Non-Billable Time (min): 0 min  Total Timed Code Minutes- SLP: 0 minute(s)        Therapy Charges for Today     Code Description Service Date Service Provider Modifiers Qty    82249101573 HC ST MOTION FLUORO EVAL SWALLOW 6 3/29/2021 Dahiana Villa MS CCC-SLP GN 1            Dahiana Villa MS CCC-SLP  3/29/2021

## 2021-03-31 PROBLEM — M75.52 SUBACROMIAL BURSITIS OF LEFT SHOULDER JOINT: Status: ACTIVE | Noted: 2021-01-01

## 2021-03-31 NOTE — PROGRESS NOTES
Subjective:     Patient ID: Joya Singh is a 80 y.o. female.    Chief Complaint:  Follow-up DJD left shoulder   Subacromial bursitis, left shoulder   History of Present Illness  Joya Singh returns to clinic for follow-up of left shoulder.  Received corticosteroid injection glenohumeral 10/6/2020 with significant symptom relief at presents today with maximal tenderness present the lateral aspect of the acromion with pain radiating inferiorly to the mid humerus.  Denies the presence of numbness or tingling increased pain noted with active forward flexion, reaching out in front, reaching back behind her back.  She has done fairly well with corticosteroid injections in the past.  Denies other concerns present this time.    Social History     Occupational History     Employer: RETIRED   Tobacco Use   • Smoking status: Never Smoker   • Smokeless tobacco: Never Used   • Tobacco comment: CAFFEINE USE: NONE   Vaping Use   • Vaping Use: Never used   Substance and Sexual Activity   • Alcohol use: No   • Drug use: No   • Sexual activity: Defer     Comment: EXERCISE - RARELY      Past Medical History:   Diagnosis Date   • Allergic rhinitis    • Anemia    • Anxiety    • Appetite absent    • Arthritis    • Asthma    • Back pain    • Bell's palsy    • Black tarry stools    • Blood in stool    • Chronic fatigue    • CKD (chronic kidney disease) stage 3, GFR 30-59 ml/min (CMS/[facility])    • Community acquired pneumonia of left lung 10/25/2018   • Cough    • Depression    • Diabetes mellitus (CMS/[facility])     LAST A1C 6   • Diabetic gastroparesis (CMS/[facility]) 2/19/2016   • Difficulty walking    • Excessive urination at night    • Frequent urination    • GERD (gastroesophageal reflux disease)    • GI bleed    • Gout    • H/O blood clots     LEFT LEG 7 OR 8 YEARS AGO   • Heat intolerance    • History of fall 10/2018   • History of prior pregnancies     x8, miscarriage 5   • History of transfusion 11/2018    due to anemia   • Hyperlipidemia    •  Hypertension    • Hypothyroidism    • Normal coronary arteries     by cath 2013   • Orthostatic hypotension    • AARON (obstructive sleep apnea)     DOESNT WEAR REGULARLY   • Pneumonia    • PONV (postoperative nausea and vomiting)    • Skin cancer    • Stroke (CMS/HCC)     Several mini-strokes   • TIA (transient ischemic attack)     LAST TIA JULY 2017   • Urination pain    • UTI (urinary tract infection)     Dec 2018 and Jan 2019     Past Surgical History:   Procedure Laterality Date   • BACK SURGERY      HARDWARE   • CHOLECYSTECTOMY      OPEN   • COLONOSCOPY  2011    due for repeat in 2021   • COLONOSCOPY N/A 10/28/2018    Procedure: COLONOSCOPY;  Surgeon: Emmanuel Rogers MD;  Location: [facility] OR;  Service: Gastroenterology   • ENDOSCOPY N/A 10/26/2018    Procedure: ESOPHAGOGASTRODUODENOSCOPY;  Surgeon: Emmanuel Rogers MD;  Location: [facility] OR;  Service: Gastroenterology   • HYSTERECTOMY      PARTIAL    • KNEE SURGERY     • NECK SURGERY     • SPINE SURGERY     • TUMOR REMOVAL Left     Leg   • UPPER GASTROINTESTINAL ENDOSCOPY  2014    gastritis.  done by dr. hastings       Family History   Problem Relation Age of Onset   • Lupus Mother    • Heart failure Mother 59   • Heart disease Other    • Hypertension Other    • Heart attack Father    • Breast cancer Neg Hx          Review of Systems   Constitutional: Negative for chills, diaphoresis, fever and unexpected weight change.   HENT: Negative for hearing loss, nosebleeds, sore throat and tinnitus.    Eyes: Negative for pain and visual disturbance.   Respiratory: Negative for cough, shortness of breath and wheezing.    Cardiovascular: Negative for chest pain and palpitations.   Gastrointestinal: Negative for abdominal pain, diarrhea, nausea and vomiting.   Endocrine: Negative for cold intolerance, heat intolerance and polydipsia.   Genitourinary: Negative for difficulty urinating, dysuria and hematuria.   Musculoskeletal: Positive for arthralgias  "and myalgias. Negative for joint swelling.   Skin: Negative for rash and wound.   Allergic/Immunologic: Negative for environmental allergies.   Neurological: Negative for dizziness, syncope and numbness.   Hematological: Does not bruise/bleed easily.   Psychiatric/Behavioral: Negative for dysphoric mood and sleep disturbance. The patient is not nervous/anxious.            Objective:  Physical Exam   General: No acute distress.  Eyes: conjunctiva clear; pupils equally round and reactive  ENT: external ears and nose atraumatic; oropharynx clear  CV: no peripheral edema  Resp: normal respiratory effort  Skin: no rashes or wounds; normal turgor  Psych: mood and affect appropriate; recent and remote memory intact    Vitals:    03/31/21 0818   Weight: 90.7 kg (200 lb)   Height: 157.5 cm (62\")         03/31/21 0818   Weight: 90.7 kg (200 lb)     Body mass index is 36.58 kg/m².      Left Shoulder Exam     Tenderness   The patient is experiencing tenderness in the acromion.    Range of Motion   External rotation: 50   Forward flexion: 160 (passive)     Tests   Mejia test: positive  Cross arm: negative  Impingement: positive    Other   Erythema: absent  Sensation: normal  Pulse: present     Comments:  Internal rotation 4- out of 5  External rotation 4-5  Supraspinatus 4- out of 5  Subscapularis were -5  Biceps 4- out of 5  Flex/extend all digits left hand  2+ distal radial pulse  Elbow range of motion 0 degrees extension, 120 degrees flexion             Assessment:        1. Primary localized osteoarthrosis of left shoulder region    2. Subacromial bursitis of left shoulder joint           Plan:  1.  Discussed plan of care with patient.  Wish to proceed corticosteroid injection subacromial bursa, lateral approach of left shoulder.  Please see her back in clinic in 3 months to reevaluate.  Continued with active range of motion as tolerated.  All questions answered.  Large Joint Arthrocentesis: L subacromial " bursa  Date/Time: 3/31/2021 8:31 AM  Consent given by: patient  Site marked: site marked  Timeout: Immediately prior to procedure a time out was called to verify the correct patient, procedure, equipment, support staff and site/side marked as required   Supporting Documentation  Indications: pain   Procedure Details  Location: shoulder - L subacromial bursa  Preparation: Patient was prepped and draped in the usual sterile fashion  Needle size: 22 G  Approach: lateral  Medications administered: 4 mL lidocaine PF 1% 1 %; 12 mg betamethasone acetate-betamethasone sodium phosphate 6 (3-3) MG/ML  Patient tolerance: patient tolerated the procedure well with no immediate complications          Orders:  Orders Placed This Encounter   Procedures   • Large Joint Arthrocentesis: L subacromial bursa       Medications:  No orders of the defined types were placed in this encounter.      Followup:  No follow-ups on file.    Diagnoses and all orders for this visit:    1. Primary localized osteoarthrosis of left shoulder region (Primary)    2. Subacromial bursitis of left shoulder joint    Other orders  -     Large Joint Arthrocentesis: L subacromial bursa      Dictated utilizing Dragon dictation

## 2021-03-31 NOTE — NURSING NOTE
Pt arrived for possible procrit injection. Hgb 10.6 today no injection required. Pt given copy of labs and instructed to call with any questions or concerns prior to her next visit. Pt stated understanding.     Lab Results   Component Value Date    WBC 4.67 03/31/2021    HGB 10.6 (L) 03/31/2021    HCT 33.3 (L) 03/31/2021    MCV 97.4 (H) 03/31/2021     03/31/2021

## 2021-04-13 NOTE — TELEPHONE ENCOUNTER
Caller: Joya Singh    Relationship: Self    Best call back number: [phone]    What is the best time to reach you: ANYTIME    Who are you requesting to speak with (clinical staff, provider,  specific staff member): NURSE      What was the call regarding: PATIENT WANTS TO KNOW IF IT'S OK TO USE THE FLAVORED WATER (COUNTRY TIME) IN HER WATER BECAUSE SHE HAS BEEN USING A COUPLE OF PACKETS A DAY AND FAMILY MEMBERS ARE TELLING HER SHE NEEDS TO DRINK PLAIN WATER INSTEAD. SHE DOESN'T LIKE PLAIN WATER.    Do you require a callback: YES

## 2021-04-14 NOTE — TELEPHONE ENCOUNTER
"Subjective:       Patient ID: Sonam Hidalgo is a 30 y.o. female.    Vitals:  height is 5' 3" (1.6 m) and weight is 105.7 kg (233 lb). Her oral temperature is 97.3 °F (36.3 °C). Her blood pressure is 132/81 and her pulse is 79. Her respiration is 19 and oxygen saturation is 97%.     Chief Complaint: Rash    Patient has red area on left side for 3-4 days, squeezed purulent material out of the area.  No fever.  History of MRSA.  She is a little over 2 months postpartum and breastfeeding.      Rash   This is a new problem. The current episode started in the past 7 days (3-4 days). The problem is unchanged. The affected locations include the torso. The rash is characterized by redness, blistering and itchiness. She was exposed to nothing. Pertinent negatives include no anorexia, congestion, cough, diarrhea, eye pain, facial edema, fatigue, fever, joint pain, nail changes, rhinorrhea, shortness of breath, sore throat or vomiting. Past treatments include antibiotic cream. The treatment provided no relief.       Constitution: Negative for chills, fatigue and fever.   HENT: Negative for congestion and sore throat.    Neck: Negative for painful lymph nodes.   Cardiovascular: Negative for chest pain and leg swelling.   Eyes: Negative for eye pain, double vision and blurred vision.   Respiratory: Negative for cough and shortness of breath.    Gastrointestinal: Negative for nausea, vomiting and diarrhea.   Genitourinary: Negative for dysuria, frequency, urgency and history of kidney stones.   Musculoskeletal: Negative for joint pain, joint swelling, muscle cramps and muscle ache.   Skin: Positive for rash. Negative for color change, pale, erythema and bruising.   Allergic/Immunologic: Negative for seasonal allergies.   Neurological: Negative for dizziness, history of vertigo, light-headedness, passing out and headaches.   Hematologic/Lymphatic: Negative for swollen lymph nodes.   Psychiatric/Behavioral: Negative for " Pt notified    "nervous/anxious, sleep disturbance and depression. The patient is not nervous/anxious.        Objective:      Physical Exam   Constitutional: She is oriented to person, place, and time. She appears well-developed and well-nourished.   HENT:   Head: Normocephalic and atraumatic. Head is without abrasion, without contusion and without laceration.   Right Ear: External ear normal.   Left Ear: External ear normal.   Nose: Nose normal.   Mouth/Throat: Oropharynx is clear and moist.   Eyes: Pupils are equal, round, and reactive to light. Conjunctivae, EOM and lids are normal.   Neck: Trachea normal, full passive range of motion without pain and phonation normal. Neck supple.   Cardiovascular: Normal rate, regular rhythm and normal heart sounds.   Pulmonary/Chest: Effort normal and breath sounds normal. No stridor. No respiratory distress.   Musculoskeletal: Normal range of motion.   Neurological: She is alert and oriented to person, place, and time.   Skin: Skin is warm, dry and intact. Capillary refill takes less than 2 seconds. No abrasion, no bruising, no burn, no ecchymosis, no laceration, no lesion and no rash noted. No erythema.        Psychiatric: She has a normal mood and affect. Her speech is normal and behavior is normal. Judgment and thought content normal. Cognition and memory are normal.   Nursing note and vitals reviewed.      Incision & Drainage  Date/Time: 8/23/2019 5:09 PM  Performed by: Surinder Borrego NP  Authorized by: Surinder Borrego NP     Time out: Immediately prior to procedure a "time out" was called to verify the correct patient, procedure, equipment, support staff and site/side marked as required.    Consent Done?:  Yes (Verbal)    Type:  Abscess  Body area:  Trunk  Location details:  Abdomen  Anesthesia:  Local infiltration  Local anesthetic: lidocaine 1% with epinephrine  Anesthetic total (ml):  2  Scalpel size:  11  Incision type:  Single straight  Complexity:  Simple  Drainage:  " Bloody  Drainage amount:  Scant  Wound treatment:  Incision, drainage and expression of material  Patient tolerance:  Patient tolerated the procedure well with no immediate complications      Assessment:       1. Abscess        Plan:         Abscess  -     sulfamethoxazole-trimethoprim 800-160mg (BACTRIM DS) 800-160 mg Tab; Take 1 tablet by mouth 2 (two) times daily.  Dispense: 14 tablet; Refill: 0  -     mupirocin (BACTROBAN) 2 % ointment; Apply topically 3 (three) times daily.  Dispense: 30 g; Refill: 0  -     Incision & Drainage          Patient Instructions   PLEASE READ YOUR DISCHARGE INSTRUCTIONS ENTIRELY AS IT CONTAINS IMPORTANT INFORMATION.    Please return here or go to the Emergency Department for any concerns or worsening of condition.    If you were prescribed antibiotics, please take them to completion.    If not allergic, please take over the counter Tylenol (Acetaminophen) and/or Motrin (Ibuprofen) as directed for control of pain and/or fever.  Please follow up with your primary care doctor or specialist as needed.  Soak wound as discussed and apply warm compresses frequently     If this is a recurrent issue, use hibiclens three times a week as body wash to help prevent future abscess(es), let stay on skin for 5 minutes before rinsing.off.  If placed on antibiotics, complete them.  The abscess may drain for a day or two, keep covered while out.     If you smoke, please stop smoking.    Please return or see your primary care doctor if you develop new or worsening symptoms.     Please arrange follow up with your primary medical clinic as soon as possible. You must understand that you've received an Urgent Care treatment only and that you may be released before all of your medical problems are known or treated. You, the patient, will arrange for follow up as instructed. If your symptoms worsen or fail to improve you should go to the Emergency Room.  Abscess (Antibiotic Treatment Only)  An abscess  (sometimes called a boil) happens when bacteria get trapped under the skin and start to grow. Pus forms inside the abscess as the body responds to the bacteria. An abscess can happen with an insect bite, ingrown hair, blocked oil gland, pimple, cyst, or puncture wound.  In the early stages, your wound may be red and tender. For this stage, you may get antibiotics. If the abscess does not get better with antibiotics, it will need to be drained with a small cut.  Home care  These tips will help you care for your abscess at home:  · Soak the wound in hot water or apply hot packs (small towel soaked in hot water) to the area for 20 minutes at a time. Do this 3 to 4 times a day.  · Do not cut, squeeze, or pop the boil yourself.  · Apply antibiotic cream or ointment to the skin 3 to 4 times a day, unless something else was prescribed. Some ointments include an antibiotic plus a pain reliever.  · If your doctor prescribed antibiotics, do not stop taking them until you have finished the medicine or the doctor tells you to stop.  · You may use an over-the-counter pain medicine to control pain, unless another pain medicine was prescribed. If you have chronic liver or kidney disease or ever had a stomach ulcer or gastrointestinal bleeding, talk with your doctor before using these any of these.  Follow-up care  Follow up with your healthcare provider, or as advised. Check your wound each day for the signs of worsening infection listed below.  When to seek medical advice  Get prompt medical attention if any of these occur:  · An increase in redness or swelling  · Red streaks in the skin leading away from the abscess  · An increase in local pain or swelling  · Fever of 100.4ºF (38ºC) or higher, or as directed by your healthcare provider  · Pus or fluid coming from the abscess  · Boil returns after getting better  Date Last Reviewed: 9/1/2016  © 8389-9792 [employer]. [address] [place] [address], [place] [address]. All  rights reserved. This information is not intended as a substitute for professional medical care. Always follow your healthcare professional's instructions.

## 2021-04-14 NOTE — NURSING NOTE
HGB 8.5 today.  Pt feeling very fatigued.  Per Dr Headley, 1 unit PRBC's ordered.  Pt to have Type and screen drawn same day as transfusion which is scheduled for Thursday 4/15 at 9:30 in [facility].

## 2021-04-15 NOTE — TELEPHONE ENCOUNTER
Caller: MOHIT MERCHANT    Relationship: [facility]    Best call back number: [phone]    What orders are you requesting (i.e. lab or imaging): VERBAL ORDER FOR IN HOME PHYSICAL THERAPY VISITS    In what timeframe would the patient need to come in: ASAP    Where will you receive your lab/imaging services: HOME    Additional notes: MOHIT FROM [facility] CALLED AND REQUESTED A CALLBACK WITH ORDERS FOR PATIENT TO BEGIN RECEIVING PHYSICAL THERAPY

## 2021-04-15 NOTE — NURSING NOTE
NURSING PROGRESS NOTE: Patient arrived to [facility] at 0910 per W/C, here for scheduled transfusion.  1 unit PRBC infused without incident.  Patient declined a copy of AVS stating has received this information with multiple past transfusions.  Escorted to lobby per W/C at 1300 and discharged home with family. ANISH Kathleen

## 2021-04-15 NOTE — PATIENT INSTRUCTIONS
Call Dr. Elieser Headley, [facility] at [phone]   Blood Transfusion, Adult, Care After  This sheet gives you information about how to care for yourself after your procedure. Your doctor may also give you more specific instructions. If you have problems or questions, contact your doctor.  What can I expect after the procedure?  After the procedure, it is common to have:  · Bruising and soreness at the IV site.  · A fever or chills on the day of the procedure. This may be your body's response to the new blood cells received.  · A headache.  Follow these instructions at home:  Insertion site care         · Follow instructions from your doctor about how to take care of your insertion site. This is where an IV tube was put into your vein. Make sure you:  ? Wash your hands with soap and water before and after you change your bandage (dressing). If you cannot use soap and water, use hand .  ? Change your bandage as told by your doctor.  · Check your insertion site every day for signs of infection. Check for:  ? Redness, swelling, or pain.  ? Bleeding from the site.  ? Warmth.  ? Pus or a bad smell.  General instructions  · Take over-the-counter and prescription medicines only as told by your doctor.  · Rest as told by your doctor.  · Go back to your normal activities as told by your doctor.  · Keep all follow-up visits as told by your doctor. This is important.  Contact a doctor if:  · You have itching or red, swollen areas of skin (hives).  · You feel worried or nervous (anxious).  · You feel weak after doing your normal activities.  · You have redness, swelling, warmth, or pain around the insertion site.  · You have blood coming from the insertion site, and the blood does not stop with pressure.  · You have pus or a bad smell coming from the insertion site.  Get help right away if:  · You have signs of a serious reaction. This may be coming from an allergy or the body's defense system (immune system). Signs  "include:  ? Trouble breathing or shortness of breath.  ? Swelling of the face or feeling warm (flushed).  ? Fever or chills.  ? Head, chest, or back pain.  ? Dark pee (urine) or blood in the pee.  ? Widespread rash.  ? Fast heartbeat.  ? Feeling dizzy or light-headed.  You may receive your blood transfusion in an outpatient setting. If so, you will be told whom to contact to report any reactions.  These symptoms may be an emergency. Do not wait to see if the symptoms will go away. Get medical help right away. Call your local emergency services (911 in the [place] Do not drive yourself to the hospital.  Summary  · Bruising and soreness at the IV site are common.  · Check your insertion site every day for signs of infection.  · Rest as told by your doctor. Go back to your normal activities as told by your doctor.  · Get help right away if you have signs of a serious reaction.  This information is not intended to replace advice given to you by your health care provider. Make sure you discuss any questions you have with your health care provider.  Document Revised: 06/11/2020 Document Reviewed: 06/11/2020  [employer] Patient Education © 2021 [employer]   if you have any problems or concerns.    We know you have a Choice in healthcare and appreciate you using [facility].  Our purpose is to provide you \"Excellent Care\".  We hope that you will always choose us in the future and continue to recommend us to your family and friends.              "

## 2021-04-21 NOTE — NURSING NOTE
Hgb 10.1 today. The pt was informed of result and given a copy of today's lab report. The pt received a 1 unit blood transfusion last week.  The pt knows that she will not receive her injections today. The pt will return in 1 week for labs and possible procrit and neupogen injections.

## 2021-04-26 NOTE — TELEPHONE ENCOUNTER
Caller: Marycruz Salazar    Relationship: Self    Best call back number: [phone]    What is the best time to reach you: ANYTIME    Who are you requesting to speak with (clinical staff, provider,  specific staff member): CLINICAL STAFF    Do you know the name of the person who called: MARYCRUZ SALAZAR    What was the call regarding: PATIENT IS WANTING TO CONFIRM SHE CAN HAVE HER SKIN TAG REMOVED AT HER APPOINTMENT Wednesday, 04/28/21     Do you require a callback: YES

## 2021-04-28 NOTE — PROGRESS NOTES
Joya Singh is a 81 y.o. female, who presents with a chief complaint of   Chief Complaint   Patient presents with   • 4 week ov   • skin tag removal           HPI   Pt here for follow up    MDS-Follows with oncology, recently saw in 12/9/20, on procrit/neupogen. Her last blood transfusion was about 2 weeks ago, Hgb today 10.9.       DM2-  She says most glucoses 120-170.   She denies hypoglycemia since last OV. Taking 54 u Tresiba, prescribed 5u meal time insulin but not currently taking it.  Their entire home has been trying to cut out more sugar.      She has diabetic retinopathy in her left eye and has had to have shots in them.  She goes back to [facility] tomorrow.  She has proteinuria/ckd stage 4/nephropathy as well.     She saw dr. Fall and was put on mybertriq for OAB.  She has also been drinking a glass of cranberry juice a day.  The new med has really helped her urgency.  She is on macrobid daily for UTI PPX.     HH is seeing pt with PT/OT in her home    Pt was admitted to the hospital at [facility] for aspiration pneumonia.  She had a swallow study was neg for aspiration.  Pills get stuck often.     She has been having more dental issues lately.  She has teeth that have cracked and broken.  She is not on a bisphosphonate.      Pt has mole on her back.  The lesion seems to be growing.  It also catches on her clothing and hurts.     The following portions of the patient's history were reviewed and updated as appropriate: allergies, current medications, past family history, past medical history, past social history, past surgical history and problem list.    Allergies: Baclofen, Codeine, Lisinopril, Morphine, and Penicillins    Review of Systems   Constitutional: Negative.    HENT: Negative.    Eyes: Negative.    Respiratory: Negative.    Cardiovascular: Negative.    Gastrointestinal: Negative.    Endocrine: Negative.    Genitourinary: Negative.    Musculoskeletal: Negative.    Skin: Negative.     Allergic/Immunologic: Negative.    Neurological: Negative.    Hematological: Negative.    Psychiatric/Behavioral: Negative.    All other systems reviewed and are negative.            Wt Readings from Last 3 Encounters:   04/28/21 90.1 kg (198 lb 11.2 oz)   04/15/21 90.7 kg (200 lb)   03/31/21 90.7 kg (200 lb)     Temp Readings from Last 3 Encounters:   04/28/21 97.1 °F (36.2 °C) (Temporal)   04/15/21 98 °F (36.7 °C)   04/14/21 96.9 °F (36.1 °C) (Infrared)     BP Readings from Last 3 Encounters:   04/28/21 120/68   04/15/21 148/66   03/25/21 126/58     Pulse Readings from Last 3 Encounters:   04/28/21 91   04/15/21 84   03/25/21 81     Body mass index is 36.33 kg/m².  SpO2 Readings from Last 3 Encounters:   04/28/21 98%   04/15/21 95%   03/25/21 94%            Physical Exam  Vitals and nursing note reviewed.   Constitutional:       General: She is not in acute distress.     Appearance: She is well-developed.   HENT:      Head: Normocephalic and atraumatic.      Right Ear: External ear normal.      Left Ear: External ear normal.      Nose: Nose normal.   Eyes:      Conjunctiva/sclera: Conjunctivae normal.      Pupils: Pupils are equal, round, and reactive to light.   Cardiovascular:      Rate and Rhythm: Normal rate and regular rhythm.      Heart sounds: Normal heart sounds.   Pulmonary:      Effort: Pulmonary effort is normal. No respiratory distress.      Breath sounds: Normal breath sounds. No wheezing.   Musculoskeletal:      Cervical back: Normal range of motion and neck supple.      Comments: In wheelchair   Skin:     General: Skin is warm and dry.   Neurological:      Mental Status: She is alert and oriented to person, place, and time.   Psychiatric:         Behavior: Behavior normal.         Thought Content: Thought content normal.         Judgment: Judgment normal.         Results for orders placed or performed in visit on 04/28/21   CBC Auto Differential    Specimen: Blood   Result Value Ref Range    WBC  5.94 3.40 - 10.80 10*3/mm3    RBC 3.48 (L) 3.77 - 5.28 10*6/mm3    Hemoglobin 10.9 (L) 12.0 - 15.9 g/dL    Hematocrit 33.6 (L) 34.0 - 46.6 %    MCV 96.6 79.0 - 97.0 fL    MCH 31.3 26.6 - 33.0 pg    MCHC 32.4 31.5 - 35.7 g/dL    RDW 21.3 (H) 12.3 - 15.4 %    RDW-SD 73.5 (H) 37.0 - 54.0 fl    MPV 12.3 (H) 6.0 - 12.0 fL    Platelets 248 140 - 450 10*3/mm3    Neutrophil % 59.6 42.7 - 76.0 %    Lymphocyte % 27.8 19.6 - 45.3 %    Monocyte % 4.7 (L) 5.0 - 12.0 %    Eosinophil % 4.9 0.3 - 6.2 %    Basophil % 1.5 0.0 - 1.5 %    Immature Grans % 1.5 (H) 0.0 - 0.5 %    Neutrophils, Absolute 3.54 1.70 - 7.00 10*3/mm3    Lymphocytes, Absolute 1.65 0.70 - 3.10 10*3/mm3    Monocytes, Absolute 0.28 0.10 - 0.90 10*3/mm3    Eosinophils, Absolute 0.29 0.00 - 0.40 10*3/mm3    Basophils, Absolute 0.09 0.00 - 0.20 10*3/mm3    Immature Grans, Absolute 0.09 (H) 0.00 - 0.05 10*3/mm3    nRBC 0.0 0.0 - 0.2 /100 WBC     *Note: Due to a large number of results and/or encounters for the requested time period, some results have not been displayed. A complete set of results can be found in Results Review.     Result Review :       \plain       Biopsy    Date/Time: 4/28/2021 3:18 PM  Performed by: Chari Mercado MD  Authorized by: Chari Mercado MD     Procedure Details - Skin Biopsy:     Body area: trunk    Trunk location: back    Initial size (mm): 10    Final defect size (mm): 12    Malignancy: benign lesion      Destruction method: shave biopsy              Assessment and Plan    Diagnoses and all orders for this visit:    1. Pill dysphagia (Primary)  -     Ambulatory Referral to Gastroenterology    2. Chronic right shoulder pain  -     Ambulatory Referral to Pain Management    3. Type 2 diabetes mellitus with diabetic neuropathy, with long-term current use of insulin (CMS/[facility])    4. Chronic diastolic CHF (congestive heart failure) (CMS/[facility])    5. Myelodysplastic syndrome with 5q deletion (CMS/[facility])    6. Acquired  hypothyroidism    7. CKD stage 3 due to type 2 diabetes mellitus (CMS/formerly [facility])    8. Anemia requiring transfusions    9. Gastroesophageal reflux disease, unspecified whether esophagitis present    10. Skin lesion  -     Biopsy  -     Tissue Pathology Exam; Future    Other orders  -     nystatin (MYCOSTATIN) 571688 UNIT/GM cream; Apply  topically to the appropriate area as directed 2 (two) times a day.  Dispense: 30 g; Refill: 0              Outpatient Medications Prior to Visit   Medication Sig Dispense Refill   • ACCU-CHEK FASTCLIX LANCETS misc TEST 3-4 TIMES DAILY AS DIRECTED 400 each 3   • ACCU-CHEK SMARTVIEW test strip TEST BLOOD SUGAR THREE TIMES DAILY OR AS DIRECTED 300 each 3   • acetaminophen (TYLENOL) 325 MG tablet Take 2 tablets by mouth Every 6 (Six) Hours As Needed for Mild Pain . OTC product 40 tablet 0   • albuterol (PROVENTIL) (2.5 MG/3ML) 0.083% nebulizer solution Take 2.5 mg by nebulization Every 4 (Four) Hours As Needed for Wheezing. 100 each 2   • atorvastatin (LIPITOR) 10 MG tablet TAKE ONE TABLET BY MOUTH AT BEDTIME 90 tablet 1   • Blood Glucose Monitoring Suppl (ACCU-CHEK KENNETH SMARTVIEW) w/Device kit TEST blood sugar three times daily or as directed 1 kit 0   • cyclobenzaprine (FLEXERIL) 10 MG tablet TAKE ONE TABLET BY MOUTH THREE TIMES DAILY as needed for muscle spasms 45 tablet 0   • desvenlafaxine (PRISTIQ) 50 MG 24 hr tablet TAKE ONE TABLET BY MOUTH EVERY DAY 90 tablet 1   • dicyclomine (Bentyl) 10 MG capsule Take 1 cap po q 8 hours prn spasm 60 capsule 0   • Eliquis 5 MG tablet tablet TAKE ONE TABLET BY MOUTH TWICE DAILY 180 tablet 0   • furosemide (LASIX) 20 MG tablet Take one tab daily every day.  Take 1 additional pill daily PRN LE swelling 180 tablet 1   • gabapentin (NEURONTIN) 400 MG capsule TAKE ONE CAPSULE BY MOUTH EVERY MORNING, AT NOON, AND TWO AT BEDTIME 120 capsule 2   • HYDROcodone-acetaminophen (Norco) 5-325 MG per tablet Take 1 tablet by mouth Every 12 (Twelve) Hours As Needed  "for Severe Pain . 30 tablet 0   • insulin aspart (novoLOG) 100 UNIT/ML injection Inject 5 Units under the skin into the appropriate area as directed 3 (Three) Times a Day With Meals.  12   • Insulin Degludec (Tresiba FlexTouch) 200 UNIT/ML solution pen-injector pen injection Inject 50 Units under the skin into the appropriate area as directed Every Night. 9 mL 11   • levothyroxine (SYNTHROID, LEVOTHROID) 88 MCG tablet TAKE ONE TABLET BY MOUTH EVERY DAY 90 tablet 1   • midodrine (PROAMATINE) 2.5 MG tablet Take 2.5 mg by mouth 3 (Three) Times a Day.     • Mirabegron ER (Myrbetriq) 25 MG tablet sustained-release 24 hour 24 hr tablet Take 1 tablet by mouth Daily. 30 tablet 5   • Nebulizer device 1 each Take As Directed. 1 each 0   • Needle, Disp, (BD DISP NEEDLES) 30G X 1/2\" misc To be used 3 times daily with Novolog Flexpen. 100 each 5   • nitrofurantoin (MACRODANTIN) 100 MG capsule Take 100 mg by mouth Daily.     • nitrofurantoin, macrocrystal-monohydrate, (MACROBID) 100 MG capsule Take 100 mg by mouth Daily.     • O2 (OXYGEN) Inhale 2 L/min Every Night. Uses 2L  overnight only     • omeprazole (priLOSEC) 40 MG capsule TAKE ONE CAPSULE BY MOUTH EVERY DAY 90 capsule 1   • ondansetron (ZOFRAN) 8 MG tablet Take 1 tablet by mouth 3 (Three) Times a Day As Needed for Nausea or Vomiting. 30 tablet 5   • potassium chloride 10 MEQ CR tablet TAKE ONE TABLET BY MOUTH EVERY DAY 90 tablet 2   • sennosides-docusate (senna-docusate sodium) 8.6-50 MG per tablet Take 2 tablets by mouth Daily. 60 tablet 3   • UltiCare Mini Pen Needles 31G X 6 MM misc USE TO inject insulins FOUR TIMES DAILY AS DIRECTED 400 each 3   • Ventolin  (90 Base) MCG/ACT inhaler INHALE TWO PUFFS BY MOUTH EVERY 4 HOURS AS NEEDED FOR WHEEZING 18 g 3   • nystatin (MYCOSTATIN) 528750 UNIT/GM powder Apply  topically to the appropriate area as directed 2 (Two) Times a Day. 60 g 2   • Diclofenac Sodium (Voltaren) 1 % gel gel Apply 4 g topically to the appropriate " area as directed 4 (Four) Times a Day As Needed (shoulder pain). 400 g 2   • diphenhydrAMINE (BENADRYL) 25 mg capsule Take 25 mg by mouth Every 6 (Six) Hours As Needed for Itching.     • levoFLOXacin (LEVAQUIN) 250 MG tablet Take 250 mg by mouth Daily.     • phenazopyridine (PYRIDIUM) 200 MG tablet Take 200 mg by mouth 3 (Three) Times a Day.       Facility-Administered Medications Prior to Visit   Medication Dose Route Frequency Provider Last Rate Last Admin   • acetaminophen (TYLENOL) tablet 650 mg  650 mg Oral Q6H PRN Elieser Headley MD       • diphenhydrAMINE (BENADRYL) capsule 25 mg  25 mg Oral Once Elieser Headley MD         New Medications Ordered This Visit   Medications   • nystatin (MYCOSTATIN) [id number] UNIT/GM cream     Sig: Apply  topically to the appropriate area as directed 2 (two) times a day.     Dispense:  30 g     Refill:  0     [unfilled]  Medications Discontinued During This Encounter   Medication Reason   • nystatin (MYCOSTATIN) [id number] UNIT/GM powder          Return in about 1 month (around 5/28/2021) for Recheck.    Patient was given instructions and counseling regarding her condition or for health maintenance advice. Please see specific information pulled into the AVS if appropriate.

## 2021-04-30 NOTE — TELEPHONE ENCOUNTER
PATIENT CALLED CHECKING ON STATUS OF MED REFILL. PATIENT IS COMPLETELY OUT     CALL BACK   [phone]

## 2021-05-07 PROBLEM — R10.13 EPIGASTRIC PAIN: Status: ACTIVE | Noted: 2021-01-01

## 2021-05-07 PROBLEM — K21.00 GASTROESOPHAGEAL REFLUX DISEASE WITH ESOPHAGITIS WITHOUT HEMORRHAGE: Status: ACTIVE | Noted: 2021-01-01

## 2021-05-07 PROBLEM — R13.19 ESOPHAGEAL DYSPHAGIA: Status: ACTIVE | Noted: 2021-01-01

## 2021-05-07 PROBLEM — R19.7 DIARRHEA: Status: ACTIVE | Noted: 2021-01-01

## 2021-05-07 NOTE — PROGRESS NOTES
"    PATIENT INFORMATION  Joya Singh       - 1940    CHIEF COMPLAINT  Chief Complaint   Patient presents with   • Difficulty Swallowing       HISTORY OF PRESENT ILLNESS  Swallow issues with worsening dysphagia and has been on omeprazole 40mg daily for years.  Had recent swallow study that was neg for upper issues with her aspiration pneumonia after eating pork skins.   But has had pill dysphagia for at least 7 years.  Worse issue 2-3 months now.  Normally one bm a day but about a month ago had episode of severe diarrhea with incont after eating grapes, intermittent diarrhea not severe and not every day.  Cant sleep in a bed uses a recliner with her head up.      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\" needs transfn  10/28/12 Colon normal no polyps  18 EGD for melana:  reflux esph, no Barretts  14 EGD gastritis, irreg z line,  reflux esoph, neg hp  11 EGD & Colon , gastritis reflux diverticulosis      REVIEWED PERTINENT RESULTS/ LABS  Lab Results   Component Value Date    CASEREPORT  2019     Surgical Pathology Report                         Case: [id number]                                  Authorizing Provider:  Elieser Headley MD       Collected:           2019 11:15 AM          Ordering Location:     [facility]   Received:            2019 12:42 PM                                 CT                                                                           Pathologist:           Serafin Mabry MD                                                     Specimens:   1) - Iliac Crest, Right - Biopsy, bone in formalin at 11:15                                         2) - Iliac Crest, Right - Aspirate, 5 smears and cell block, 1 purple top tubes sent                to [facility]                  "                                                                    FINALDX  03/06/2019     1. Core Biopsy, Right Iliac Crest:   A. Normal overall cellularity approximating 35%.   B. Erythroid hyperplasia with megaloblastoid change.   C. The myeloid series is well represented with normal maturation.   D. The myeloid to erythroid ratio approximates 2:1.   E. Megakaryocytes are increased in number with frequent micromegakaryocytes.   F. Scattered lymphoid aggregates.   G. Morphologically normal plasma cells encompassing up to 8% of the overall cellularity.   H. Negative for excess blasts.   I.  No cells extrinsic to the marrow are identified.   J. Special stains are negative for increased iron stores.   K. Reticulin and trichrome stains are unremarkable.   L. Special stains to follow.    2. Bone Marrow Aspiration Smears and Clot, Right Iliac Crest:   A. Normal overall cellularity approximating 35%.   B. Erythroid hyperplasia with megaloblastoid change.   C. The myeloid series is well represented with normal maturation.   D. The myeloid to erythroid ratio approximates 2:1.   E. Megakaryocytes are increased in number with frequent micromegakaryocytes.   F. Scattered lymphoid aggregates.   G. Morphologically normal plasma cells encompassing up to 8% of the overall cellularity.   H. Negative for excess blasts.   I.  No cells extrinsic to the marrow are identified.   J. Special stains are negative for increased iron stores.   K. Reticulin and trichrome stains are unremarkable.   L. Special stains to follow.    Dma/kds       Lab Results   Component Value Date    HGB 9.5 (L) 05/05/2021    MCV 96.1 05/05/2021     05/05/2021    ALT 10 01/12/2021    AST 10 01/12/2021    HGBA1C 6.1 (H) 01/12/2021    INR 1.91 (H) 07/29/2020    TRIG 57 01/12/2021    FERRITIN 1,775.00 (H) 03/09/2021    IRON 139 03/09/2021    TIBC  03/09/2021      Comment:      Unable to calculate      No results found.    REVIEW OF SYSTEMS  Review of Systems    HENT: Positive for trouble swallowing.    All other systems reviewed and are negative.        ACTIVE PROBLEMS  Patient Active Problem List    Diagnosis    • Subacromial bursitis of left shoulder joint [M75.52]    • Lateral epicondylitis, left elbow [M77.12]    • Uncontrolled type 2 diabetes mellitus with hyperglycemia (CMS/[facility]) [E11.65]    • Syncope [R55]    • Confusion [R41.0]    • Iron overload, transfusional [E83.111]    • Morbidly obese (CMS/[facility]) [E66.01]    • Primary osteoarthritis of right knee [M17.11]    • Knee pain [M25.569]    • Chronic diastolic CHF (congestive heart failure) (CMS/[facility]) [I50.32]    • History of deep venous thrombosis (DVT) of distal vein of left lower extremity [Z86.718]    • Moderate episode of recurrent major depressive disorder (CMS/HCC) [F33.1]    • Monoclonal gammopathy of unknown significance (MGUS) [D47.2]    • 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]    • Anemia requiring transfusions [D64.9]    • Right carpal tunnel syndrome [G56.01]    • Tendinitis of right rotator cuff [M75.81]    • AC joint arthropathy [M19.019]    • Subacromial impingement of right shoulder [M75.41]    • Orthostatic hypotension [I95.1]    • Primary osteoarthritis of left knee [M17.12]    • Complex tear of medial meniscus of right knee as current injury [S83.231A]    • Insufficiency fracture of tibia [M84.469A]    • CKD stage 3 due to type 2 diabetes mellitus (CMS/[facility]) [E11.22, N18.30]    • Anxiety [F41.9]    • Gastroesophageal reflux disease [K21.9]    • History of biliary T-tube placement [Z98.890]    • Acquired hypothyroidism [E03.9]    • Primary localized osteoarthrosis of left shoulder region [M19.012]    • Rotator cuff tendonitis [M75.80]    • Diabetic gastroparesis (CMS/[facility]) [E11.43, K31.84]    • Deep vein thrombosis of lower extremity (CMS/[facility]) [I82.409]    • Arthritis [M19.90]    • Chronic back pain [M54.9, G89.29]    • Chronic anemia [D64.9]    • Anxiety and depression [F41.9,  F32.9]    • Type 2 diabetes mellitus with neurologic complication (CMS/HCC) [E11.49]    • 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.9]    • Hyperlipidemia [E78.5]    • Hypertension [I10]    • Insomnia with sleep apnea [G47.00, G47.30]    • Obstructive sleep apnea syndrome [G47.33]    • Peripheral neuropathy [G62.9]    • Vitamin D deficiency [E55.9]    • Localized edema [R60.0]    • Type 2 diabetes mellitus with diabetic neuropathy (CMS/HCC) [E11.40]          PAST MEDICAL HISTORY  Past Medical History:   Diagnosis Date   • Allergic rhinitis    • Anemia    • Anxiety    • Appetite absent    • Arthritis    • Asthma    • Back pain    • Bell's palsy    • Black tarry stools    • Blood in stool    • Chronic fatigue    • CKD (chronic kidney disease) stage 3, GFR 30-59 ml/min (CMS/HCC)    • Community acquired pneumonia of left lung 10/25/2018   • Cough    • Depression    • Diabetes mellitus ([facility] [place])     LAST A1C 6   • Diabetic gastroparesis (CMS/HCC) 2/19/2016   • Difficulty walking    • Excessive urination at night    • Frequent urination    • GERD (gastroesophageal reflux disease)    • GI bleed    • Gout    • H/O blood clots     LEFT LEG 7 OR 8 YEARS AGO   • Heat intolerance    • History of fall 10/2018   • History of prior pregnancies     x8, miscarriage 5   • History of transfusion 11/2018    due to anemia   • Hyperlipidemia    • Hypertension    • Hypothyroidism    • Normal coronary arteries     by cath 2013   • Orthostatic hypotension    • AARON (obstructive sleep apnea)     DOESNT WEAR REGULARLY   • Pneumonia    • PONV (postoperative nausea and vomiting)    • Skin cancer    • Stroke (CMS/HCC)     Several mini-strokes   • TIA (transient ischemic attack)     LAST TIA JULY 2017   • Urination pain    • UTI (urinary tract infection)     Dec 2018 and Jan 2019         SURGICAL HISTORY  Past Surgical History:   Procedure Laterality Date   • BACK SURGERY      HARDWARE   • CHOLECYSTECTOMY      OPEN   • COLONOSCOPY  2011    due for  repeat in 2021   • COLONOSCOPY N/A 10/28/2018    Procedure: COLONOSCOPY;  Surgeon: Emmanuel Rogers MD;  Location:  [facility];  Service: Gastroenterology   • ENDOSCOPY N/A 10/26/2018    Procedure: ESOPHAGOGASTRODUODENOSCOPY;  Surgeon: Emmanuel Rogers MD;  Location:  [facility];  Service: Gastroenterology   • HYSTERECTOMY      PARTIAL    • KNEE SURGERY     • NECK SURGERY     • SPINE SURGERY     • TUMOR REMOVAL Left     Leg   • UPPER GASTROINTESTINAL ENDOSCOPY  2014    gastritis.  done by dr. hastings         FAMILY HISTORY  Family History   Problem Relation Age of Onset   • Lupus Mother    • Heart failure Mother 59   • Heart disease Other    • Hypertension Other    • Heart attack Father    • Breast cancer Neg Hx          SOCIAL HISTORY  Social History     Occupational History     Employer: RETIRED   Tobacco Use   • Smoking status: Never Smoker   • Smokeless tobacco: Never Used   • Tobacco comment: CAFFEINE USE: NONE   Vaping Use   • Vaping Use: Never used   Substance and Sexual Activity   • Alcohol use: No   • Drug use: No   • Sexual activity: Defer     Comment: EXERCISE - RARELY         CURRENT MEDICATIONS    Current Outpatient Medications:   •  ACCU-CHEK FASTCLIX LANCETS misc, TEST 3-4 TIMES DAILY AS DIRECTED, Disp: 400 each, Rfl: 3  •  ACCU-CHEK SMARTVIEW test strip, TEST BLOOD SUGAR THREE TIMES DAILY OR AS DIRECTED, Disp: 300 each, Rfl: 3  •  acetaminophen (TYLENOL) 325 MG tablet, Take 2 tablets by mouth Every 6 (Six) Hours As Needed for Mild Pain . OTC product, Disp: 40 tablet, Rfl: 0  •  albuterol (PROVENTIL) (2.5 MG/3ML) 0.083% nebulizer solution, Take 2.5 mg by nebulization Every 4 (Four) Hours As Needed for Wheezing., Disp: 100 each, Rfl: 2  •  atorvastatin (LIPITOR) 10 MG tablet, TAKE ONE TABLET BY MOUTH AT BEDTIME, Disp: 90 tablet, Rfl: 1  •  Blood Glucose Monitoring Suppl (ACCU-CHEK KENNETH SMARTVIEW) w/Device kit, TEST blood sugar three times daily or as directed, Disp: 1 kit, Rfl: 0  •   cyclobenzaprine (FLEXERIL) 10 MG tablet, TAKE ONE TABLET BY MOUTH THREE TIMES DAILY as needed for muscle spasms, Disp: 45 tablet, Rfl: 0  •  desvenlafaxine (PRISTIQ) 50 MG 24 hr tablet, TAKE ONE TABLET BY MOUTH EVERY DAY, Disp: 90 tablet, Rfl: 1  •  Diclofenac Sodium (Voltaren) 1 % gel gel, Apply 4 g topically to the appropriate area as directed 4 (Four) Times a Day As Needed (shoulder pain)., Disp: 400 g, Rfl: 2  •  dicyclomine (Bentyl) 10 MG capsule, Take 1 cap po q 8 hours prn spasm, Disp: 60 capsule, Rfl: 0  •  diphenhydrAMINE (BENADRYL) 25 mg capsule, Take 25 mg by mouth Every 6 (Six) Hours As Needed for Itching., Disp: , Rfl:   •  Eliquis 5 MG tablet tablet, TAKE ONE TABLET BY MOUTH TWICE DAILY, Disp: 180 tablet, Rfl: 0  •  famotidine (PEPCID) 40 MG tablet, Take 1 tablet by mouth 2 (Two) Times a Day. With lunch and at bedtime, Disp: 180 tablet, Rfl: 3  •  furosemide (LASIX) 20 MG tablet, Take one tab daily every day.  Take 1 additional pill daily PRN LE swelling, Disp: 180 tablet, Rfl: 1  •  gabapentin (NEURONTIN) 400 MG capsule, TAKE ONE CAPSULE BY MOUTH EVERY MORNING, AT NOON, AND TWO AT BEDTIME, Disp: 120 capsule, Rfl: 2  •  HYDROcodone-acetaminophen (NORCO) 5-325 MG per tablet, TAKE ONE TABLET BY MOUTH every 12 hours AS NEEDED FOR SEVERE PAIN, Disp: 30 tablet, Rfl: 0  •  insulin aspart (novoLOG) 100 UNIT/ML injection, Inject 5 Units under the skin into the appropriate area as directed 3 (Three) Times a Day With Meals., Disp: , Rfl: 12  •  Insulin Degludec (Tresiba FlexTouch) 200 UNIT/ML solution pen-injector pen injection, Inject 50 Units under the skin into the appropriate area as directed Every Night., Disp: 9 mL, Rfl: 11  •  levoFLOXacin (LEVAQUIN) 250 MG tablet, Take 250 mg by mouth Daily., Disp: , Rfl:   •  levothyroxine (SYNTHROID, LEVOTHROID) 88 MCG tablet, TAKE ONE TABLET BY MOUTH EVERY DAY, Disp: 90 tablet, Rfl: 1  •  midodrine (PROAMATINE) 2.5 MG tablet, Take 2.5 mg by mouth 3 (Three) Times a  "Day., Disp: , Rfl:   •  Mirabegron ER (Myrbetriq) 25 MG tablet sustained-release 24 hour 24 hr tablet, Take 1 tablet by mouth Daily., Disp: 30 tablet, Rfl: 5  •  Nebulizer device, 1 each Take As Directed., Disp: 1 each, Rfl: 0  •  Needle, Disp, (BD DISP NEEDLES) 30G X 1/2\" misc, To be used 3 times daily with Novolog Flexpen., Disp: 100 each, Rfl: 5  •  nitrofurantoin (MACRODANTIN) 100 MG capsule, Take 100 mg by mouth Daily., Disp: , Rfl:   •  nitrofurantoin, macrocrystal-monohydrate, (MACROBID) 100 MG capsule, Take 100 mg by mouth Daily., Disp: , Rfl:   •  nystatin (MYCOSTATIN) 474366 UNIT/GM cream, Apply  topically to the appropriate area as directed 2 (two) times a day., Disp: 30 g, Rfl: 0  •  O2 (OXYGEN), Inhale 2 L/min Every Night. Uses 2L  overnight only, Disp: , Rfl:   •  omeprazole (priLOSEC) 40 MG capsule, Take 1 capsule by mouth 2 (two) times a day., Disp: 180 capsule, Rfl: 3  •  ondansetron (ZOFRAN) 8 MG tablet, Take 1 tablet by mouth 3 (Three) Times a Day As Needed for Nausea or Vomiting., Disp: 30 tablet, Rfl: 5  •  phenazopyridine (PYRIDIUM) 200 MG tablet, Take 200 mg by mouth 3 (Three) Times a Day., Disp: , Rfl:   •  potassium chloride 10 MEQ CR tablet, TAKE ONE TABLET BY MOUTH EVERY DAY, Disp: 90 tablet, Rfl: 2  •  sennosides-docusate (senna-docusate sodium) 8.6-50 MG per tablet, Take 2 tablets by mouth Daily., Disp: 60 tablet, Rfl: 3  •  UltiCare Mini Pen Needles 31G X 6 MM misc, USE TO inject insulins FOUR TIMES DAILY AS DIRECTED, Disp: 400 each, Rfl: 3  •  Ventolin  (90 Base) MCG/ACT inhaler, INHALE TWO PUFFS BY MOUTH EVERY 4 HOURS AS NEEDED FOR WHEEZING, Disp: 18 g, Rfl: 3  No current facility-administered medications for this visit.    Facility-Administered Medications Ordered in Other Visits:   •  acetaminophen (TYLENOL) tablet 650 mg, 650 mg, Oral, Q6H PRN, Elieser Headley MD  •  diphenhydrAMINE (BENADRYL) capsule 25 mg, 25 mg, Oral, Once, Elieser Headley MD    ALLERGIES  Baclofen, " "Codeine, Lisinopril, Morphine, and Penicillins    VITALS  Vitals:    05/07/21 1033   Temp: 97.5 °F (36.4 °C)   TempSrc: Temporal   Weight: 92.8 kg (204 lb 9.6 oz)   Height: 157.5 cm (62.01\")       PHYSICAL EXAM  Debilities/Disabilities Identified: None  Emotional Behavior: Appropriate  Wt Readings from Last 3 Encounters:   05/07/21 92.8 kg (204 lb 9.6 oz)   04/28/21 90.1 kg (198 lb 11.2 oz)   04/15/21 90.7 kg (200 lb)     Ht Readings from Last 1 Encounters:   05/07/21 157.5 cm (62.01\")     Body mass index is 37.41 kg/m².  Physical Exam  Vitals and nursing note reviewed.   Constitutional:       Appearance: She is well-developed.   HENT:      Head: Normocephalic and atraumatic.   Eyes:      Conjunctiva/sclera: Conjunctivae normal.      Pupils: Pupils are equal, round, and reactive to light.   Cardiovascular:      Rate and Rhythm: Normal rate and regular rhythm.   Pulmonary:      Effort: Pulmonary effort is normal.      Breath sounds: Normal breath sounds.   Abdominal:      General: Bowel sounds are normal. There is no distension.      Palpations: Abdomen is soft.      Tenderness: There is abdominal tenderness in the right upper quadrant, epigastric area, left upper quadrant and left lower quadrant.   Musculoskeletal:         General: Normal range of motion.      Cervical back: Normal range of motion and neck supple.   Lymphadenopathy:      Cervical: No cervical adenopathy.   Skin:     General: Skin is warm and dry.   Neurological:      Mental Status: She is alert and oriented to person, place, and time.   Psychiatric:         Behavior: Behavior normal.         CLINICAL DATA REVIEWED   reviewed previous lab results and integrated with today's visit, reviewed notes from other physicians and/or last GI encounter, reviewed previous endoscopy results and available photos, reviewed surgical pathology results from previous biopsies    ASSESSMENT  Diagnoses and all orders for this visit:    Gastroesophageal reflux disease " with esophagitis without hemorrhage  -     Case Request; Standing  -     Follow Anesthesia Guidelines / Protocol; Future  -     Obtain Informed Consent; Standing  -     Case Request  -     omeprazole (priLOSEC) 40 MG capsule; Take 1 capsule by mouth 2 (two) times a day.  -     famotidine (PEPCID) 40 MG tablet; Take 1 tablet by mouth 2 (Two) Times a Day. With lunch and at bedtime  -     Helicobacter Pylori, IgA IgG IgM; Future    Esophageal dysphagia  -     Case Request; Standing  -     Follow Anesthesia Guidelines / Protocol; Future  -     Obtain Informed Consent; Standing  -     Case Request  -     omeprazole (priLOSEC) 40 MG capsule; Take 1 capsule by mouth 2 (two) times a day.  -     famotidine (PEPCID) 40 MG tablet; Take 1 tablet by mouth 2 (Two) Times a Day. With lunch and at bedtime  -     Helicobacter Pylori, IgA IgG IgM; Future    Epigastric pain  -     Case Request; Standing  -     Follow Anesthesia Guidelines / Protocol; Future  -     Obtain Informed Consent; Standing  -     Case Request  -     Helicobacter Pylori, IgA IgG IgM; Future    Diarrhea, unspecified type  -     Case Request; Standing  -     Follow Anesthesia Guidelines / Protocol; Future  -     Obtain Informed Consent; Standing  -     Case Request          PLAN  Return in about 3 months (around 8/7/2021).     EGD soon for dysphagia new onset with intermittent diarrhea and hx martha with genetic variant.      Discussed the importance of taking Omeprazole/Prilosec, 40mg twice daily before breakfast and dinner permanently due to known risk with long term acid reflux of Gonzalez's esophagus/esophageal cancer., In addition, take famotidine (Pepcid) 40mg twice a day with lunch and at bedtime.       Low Acid Diet Instructions:   Don't eat late, don't eat fried or spicy, and don't eat too much at one time. Avoid alcohol and  tomato based products like pizza, lasagna, spaghetti.  If you want to have these take an over the counter Pepcid or TUMS immediately  "before you eat them.  Do not eat within 3-4 hrs of bedtime. Limit caffeine and carbonated beverages, if you must have them do not have later in the day.  If reflux is severe elevate the head of the bed. You may also use TUMS, maalox, or mylanta for breakthrough heartburn.    Take a daily probiotic (something with a billion cultures and more different strains is better, examples like \"Probiotic 10\" or probiotic gummies) for your gut as well.  AVOID taking NSAIDS (like ibuprofen, Aleve, Motrin, naproxen, meloxicam, etc) as much as possible and use acetaminophen (Tylenol) instead.      I have discussed the above plan with the patient.  They verbalize understanding and are in agreement with the plan.  They have been advised to contact the office for any questions, concerns, or changes related to their health.                      "

## 2021-05-07 NOTE — TELEPHONE ENCOUNTER
Good morning.  Patient is scheduled for EGD on 05/13/2021.  She taking Eliquis.  Can she hold prior to procedure?  If so how many days?    THANK YOU!  Lise

## 2021-05-07 NOTE — TELEPHONE ENCOUNTER
Caller: FLORENCIA SALAZAR    Relationship to patient: DAUGHTER    Best call back number: [phone]    Chief complaint: PT IS GETTING A COVID TEST ON 05/11/21 FOR AN UPCOMING PROCEDURE ON 05/13/21. PT WAS TOLD SHE WOULD NEED TO QUARANTINE UNTIL THE PROCEDURE ON 05/13/21 AFTER SHE GETS TESTED AND NEEDS TO RESCHEDULE HER APPT ON 05/12/21    Type of visit: LAB AND INJECTION    Requested date: NEXT AVAILABLE    If rescheduling, when is the original appointment: 05/12/21

## 2021-05-07 NOTE — PATIENT INSTRUCTIONS
"EGD soon for dysphagia new onset with intermittent diarrhea and hx martha with genetic variant.      Discussed the importance of taking Omeprazole/Prilosec, 40mg twice daily before breakfast and dinner permanently due to known risk with long term acid reflux of Gonzalez's esophagus/esophageal cancer., In addition, take famotidine (Pepcid) 40mg twice a day with lunch and at bedtime.       Low Acid Diet Instructions:   Don't eat late, don't eat fried or spicy, and don't eat too much at one time. Avoid alcohol and  tomato based products like pizza, lasagna, spaghetti.  If you want to have these take an over the counter Pepcid or TUMS immediately before you eat them.  Do not eat within 3-4 hrs of bedtime. Limit caffeine and carbonated beverages, if you must have them do not have later in the day.  If reflux is severe elevate the head of the bed. You may also use TUMS, maalox, or mylanta for breakthrough heartburn.    Take a daily probiotic (something with a billion cultures and more different strains is better, examples like \"Probiotic 10\" or probiotic gummies) for your gut as well.  AVOID taking NSAIDS (like ibuprofen, Aleve, Motrin, naproxen, meloxicam, etc) as much as possible and use acetaminophen (Tylenol) instead.      "

## 2021-05-11 NOTE — TELEPHONE ENCOUNTER
Spoke with pt's daughter Mayra. She states someone left pt a message regarding appt at [facility] and she had questions about that. Message sent to Yandy to call pt's daughter to discuss pt's schedule.

## 2021-05-11 NOTE — TELEPHONE ENCOUNTER
Caller: MARYCRUZ    Relationship: PATIENT    Best call back number: [phone]    What is the best time to reach you: ANYTIME    Do you know the name of the person who called: UNSURE    What was the call regarding: AN APPT FOR [facility]     Do you require a callback: YES

## 2021-05-13 NOTE — ANESTHESIA PREPROCEDURE EVALUATION
Anesthesia Evaluation     Patient summary reviewed and Nursing notes reviewed   history of anesthetic complications: PONV  NPO Solid Status: > 8 hours  NPO Liquid Status: > 8 hours           Airway   Mallampati: II  TM distance: >3 FB  Neck ROM: full  No difficulty expected  Dental    (+) poor dentition    Comment: Teeth chip off    Pulmonary - normal exam    breath sounds clear to auscultation  (+) asthma (inhaler today),home oxygen (2 lpm NC), sleep apnea (does not wear CPAP regularly) on CPAP,     ROS comment: Sore throat today, felt due to coughing and choking on medication yesterday.  Cardiovascular - normal exam    ECG reviewed  PT is on anticoagulation therapy  Rhythm: regular  Rate: normal    (+) hypertension well controlled 2 medications or greater, CHF , DVT (leg, 2 years ago) resolved, hyperlipidemia,     ROS comment: . Right bundle branch block  * Sinus rhythm  * Prolonged IL interval  * No change from prior tracing  (8/12/20)    Neuro/Psych  (+) TIA, numbness (History of [place] Palsy, peripheral neuropathy (diabetic)), psychiatric history Depression,     (-) CVA  GI/Hepatic/Renal/Endo    (+) obesity,  GERD (Pain) poorly controlled,  renal disease CRI, diabetes mellitus type 2 well controlled,     ROS Comment: Diabetic gastroparesis    Musculoskeletal     (-) back pain  Abdominal   (+) obese,    Substance History      OB/GYN negative ob/gyn ROS         Other   arthritis, blood dyscrasia (myelodysplastic syndrome),     ROS/Med Hx Other: Eliquis last on Tuesday                Anesthesia Plan    ASA 3     MAC     intravenous induction     Anesthetic plan, all risks, benefits, and alternatives have been provided, discussed and informed consent has been obtained with: patient and child.

## 2021-05-13 NOTE — ANESTHESIA POSTPROCEDURE EVALUATION
Patient: Joya Singh    Procedure Summary     Date: 05/13/21 Room / Location: [facility] ENDOSCOPY 1 /  [facility]    Anesthesia Start: 1446 Anesthesia Stop: 1509    Procedure: ESOPHAGOGASTRODUODENOSCOPY, biopsy, dilatation (N/A Esophagus) Diagnosis:       Gastroesophageal reflux disease with esophagitis without hemorrhage      Esophageal dysphagia      Epigastric pain      Reflux esophagitis      (Gastroesophageal reflux disease with esophagitis without hemorrhage [K21.00])      (Esophageal dysphagia [R13.10])      (Epigastric pain [R10.13])      (Diarrhea, unspecified type [R19.7])    Surgeons: Emmanuel Rogers MD Provider: Serafin Ferreira CRNA    Anesthesia Type: MAC ASA Status: 3          Anesthesia Type: MAC    Vitals  Vitals Value Taken Time   /98 05/13/21 1530   Temp 98 °F (36.7 °C) 05/13/21 1512   Pulse 88 05/13/21 1530   Resp 16 05/13/21 1530   SpO2 95 % 05/13/21 1530           Post Anesthesia Care and Evaluation    Patient location during evaluation: PHASE II  Patient participation: complete - patient participated  Level of consciousness: awake and alert  Pain score: 0  Pain management: satisfactory to patient  Airway patency: patent  Anesthetic complications: No anesthetic complications  PONV Status: none  Cardiovascular status: acceptable  Respiratory status: acceptable  Hydration status: acceptable    Comments: Late entry, patient seen prior to discharge.

## 2021-05-17 NOTE — TELEPHONE ENCOUNTER
PHARMACIST CALLED AND SAID PATIENT DID NOT TAKE A MED WHEN SHE SHOULD HAVE. HE SAID THAT SHE SOUNDED OUT OF IT AND HE ASKED TO SPEAK WITH SOMEONE. MARYCRUZ IS THERE ALONE.     HE WANTS THIS OFFICE TO BE AWARE AND POSSIBLE CHECK ON HER.

## 2021-05-19 NOTE — NURSING NOTE
Hgb 7.8. Pt was scheduled for a 2 unit blood transfusion Friday, May 21st at 8:30am at [facility]. The pt was informed and v/u.

## 2021-05-25 PROBLEM — D63.0 ANEMIA IN NEOPLASTIC DISEASE: Status: ACTIVE | Noted: 2021-01-01

## 2021-05-26 NOTE — PROGRESS NOTES
Joya Singh is a 81 y.o. female, who presents with a chief complaint of   Chief Complaint   Patient presents with   • Follow-up           HPI   Pt here for follow up.  Hx from pt and her daughter in law who is her primary care giver.    She went to the dentist and they rec pt have multiple teeth removed (11) and get partials or have her remaining 19 teeth removed and get partials.  Pt worried about this bc the cost I $7000.  She isnt eating well bc teeth will break.  She has dental insurance but it would only potentially cover about $1000.  She has osteopenia but has not been on bisphosphonate therapy.  Pt has been losing weight.  She will eat some snacks but it is hard to chew with her back teeth missing    MDS-Follows with oncology and on procrit/neupogen. Her last blood transfusion was 2 units last week.  hgb this and 10.1.     DM2-  She says most glucoses 120-170.  highest recent glucose in 200s.  no recent a1c bc pt has had multiple recent blood transfusions.  She was taking 54 u Tresiba nightly and was prescribed 5u meal time insulin.  They were confused on directions and started taking novolg tid and tresiba prn.  We discussed taking a lower dose of tresiba every day and using novolog as a supplement when glucose >150.   Their entire home has been trying to cut out more sugar.      She has diabetic retinopathy in her left eye and has had to have shots in them per opthalmology  She has proteinuria/ckd stage 4/nephropathy as well.     She saw dr. Fall and was put on mybertriq for OAB.  She has also been drinking a glass of cranberry juice a day.  The new med has really helped her urgency.  She is on macrobid daily for UTI PPX.      is seeing pt with PT/OT in her home     Pt saw dr. Rogers and had an egd done 5/13.  She had a balloon dilation.  She feels her swallowing is about the same.  She is on omeprazole bid and famotidine bid.       She has had worsening depression. Her DIL says she stays in her  room a lot. Pt has had her vaccines and family is trying to get her out of the house.  Usually pt will interact with others but lately she has been more to herself.  She says she is self contious about eating around others.  I offered behavioral health/counseling but pt declines.  She does like working with her flowers and she has some inside and in containers she can work with.  She is on pristiq.      The following portions of the patient's history were reviewed and updated as appropriate: allergies, current medications, past family history, past medical history, past social history, past surgical history and problem list.    Allergies: Baclofen, Codeine, Lisinopril, Morphine, and Penicillins    Review of Systems   Constitutional: Negative.    HENT: Negative.    Eyes: Negative.    Respiratory: Negative.    Cardiovascular: Negative.    Gastrointestinal: Negative.    Endocrine: Negative.    Genitourinary: Negative.    Musculoskeletal: Negative.    Skin: Negative.    Allergic/Immunologic: Negative.    Neurological: Negative.    Hematological: Negative.    Psychiatric/Behavioral: Positive for dysphoric mood. Negative for self-injury and suicidal ideas.   All other systems reviewed and are negative.            Wt Readings from Last 3 Encounters:   05/26/21 89.6 kg (197 lb 9.6 oz)   05/13/21 91.1 kg (200 lb 12.8 oz)   05/07/21 92.8 kg (204 lb 9.6 oz)     Temp Readings from Last 3 Encounters:   05/26/21 97.1 °F (36.2 °C) (Temporal)   05/21/21 97.6 °F (36.4 °C)   05/19/21 97.4 °F (36.3 °C) (Infrared)     BP Readings from Last 3 Encounters:   05/26/21 100/60   05/21/21 112/66   05/13/21 120/98     Pulse Readings from Last 3 Encounters:   05/26/21 75   05/21/21 82   05/13/21 88     Body mass index is 36.13 kg/m².  SpO2 Readings from Last 3 Encounters:   05/26/21 92%   05/21/21 95%   05/13/21 95%          Physical Exam  Vitals and nursing note reviewed.   Constitutional:       General: She is not in acute distress.      Appearance: She is well-developed.   HENT:      Head: Normocephalic and atraumatic.      Right Ear: External ear normal.      Left Ear: External ear normal.      Nose: Nose normal.   Eyes:      Conjunctiva/sclera: Conjunctivae normal.      Pupils: Pupils are equal, round, and reactive to light.   Cardiovascular:      Rate and Rhythm: Normal rate and regular rhythm.      Heart sounds: Normal heart sounds.   Pulmonary:      Effort: Pulmonary effort is normal. No respiratory distress.      Breath sounds: Normal breath sounds. No wheezing.   Musculoskeletal:      Cervical back: Normal range of motion and neck supple.      Comments: In wheelchair   Skin:     General: Skin is warm and dry.   Neurological:      Mental Status: She is alert and oriented to person, place, and time.   Psychiatric:         Behavior: Behavior normal.         Thought Content: Thought content normal.         Judgment: Judgment normal.         Results for orders placed or performed in visit on 05/26/21   CBC Auto Differential    Specimen: Blood   Result Value Ref Range    WBC 5.07 3.40 - 10.80 10*3/mm3    RBC 3.49 (L) 3.77 - 5.28 10*6/mm3    Hemoglobin 10.1 (L) 12.0 - 15.9 g/dL    Hematocrit 32.3 (L) 34.0 - 46.6 %    MCV 92.6 79.0 - 97.0 fL    MCH 28.9 26.6 - 33.0 pg    MCHC 31.3 (L) 31.5 - 35.7 g/dL    RDW 22.5 (H) 12.3 - 15.4 %    RDW-SD 70.8 (H) 37.0 - 54.0 fl    MPV 11.9 6.0 - 12.0 fL    Platelets 315 140 - 450 10*3/mm3    Neutrophil % 50.3 42.7 - 76.0 %    Lymphocyte % 33.9 19.6 - 45.3 %    Monocyte % 5.9 5.0 - 12.0 %    Eosinophil % 6.5 (H) 0.3 - 6.2 %    Basophil % 2.2 (H) 0.0 - 1.5 %    Immature Grans % 1.2 (H) 0.0 - 0.5 %    Neutrophils, Absolute 2.55 1.70 - 7.00 10*3/mm3    Lymphocytes, Absolute 1.72 0.70 - 3.10 10*3/mm3    Monocytes, Absolute 0.30 0.10 - 0.90 10*3/mm3    Eosinophils, Absolute 0.33 0.00 - 0.40 10*3/mm3    Basophils, Absolute 0.11 0.00 - 0.20 10*3/mm3    Immature Grans, Absolute 0.06 (H) 0.00 - 0.05 10*3/mm3    nRBC 0.0  0.0 - 0.2 /100 WBC     *Note: Due to a large number of results and/or encounters for the requested time period, some results have not been displayed. A complete set of results can be found in Results Review.     Result Review :                  Assessment and Plan    Diagnoses and all orders for this visit:    1. Type 2 diabetes mellitus with diabetic neuropathy, with long-term current use of insulin (CMS/[facility]) (Primary)    2. CKD stage 3 due to type 2 diabetes mellitus (CMS/[facility])    3. Anemia requiring transfusions    4. Myelodysplastic syndrome with 5q deletion (CMS/[facility])    5. Chronic diastolic CHF (congestive heart failure) (CMS/[facility])    6. Acquired hypothyroidism    7. Gastroesophageal reflux disease, unspecified whether esophagitis present    8. Depression with anxiety       Reviewed current medication plan with patient today.  Continue current medications.  Encouraged healthy diet/exercise.  Encouraged counseling for depression but pt doesn't want to do this at this time.  She has good family support.  No si/hi.  F/u 1 mo.  Pt to call sooner if any other issues arise.              Outpatient Medications Prior to Visit   Medication Sig Dispense Refill   • ACCU-CHEK FASTCLIX LANCETS misc TEST 3-4 TIMES DAILY AS DIRECTED 400 each 3   • ACCU-CHEK SMARTVIEW test strip TEST BLOOD SUGAR THREE TIMES DAILY OR AS DIRECTED 300 each 3   • acetaminophen (TYLENOL) 325 MG tablet Take 2 tablets by mouth Every 6 (Six) Hours As Needed for Mild Pain . OTC product 40 tablet 0   • albuterol (PROVENTIL) (2.5 MG/3ML) 0.083% nebulizer solution Take 2.5 mg by nebulization Every 4 (Four) Hours As Needed for Wheezing. 100 each 2   • atorvastatin (LIPITOR) 10 MG tablet TAKE ONE TABLET BY MOUTH AT BEDTIME 90 tablet 1   • Blood Glucose Monitoring Suppl (ACCU-CHEK KENNETH SMARTVIEW) w/Device kit TEST blood sugar three times daily or as directed 1 kit 0   • cyclobenzaprine (FLEXERIL) 10 MG tablet TAKE ONE TABLET BY MOUTH THREE TIMES DAILY as  "needed for muscle spasms 45 tablet 0   • desvenlafaxine (PRISTIQ) 50 MG 24 hr tablet TAKE ONE TABLET BY MOUTH EVERY DAY 90 tablet 1   • Diclofenac Sodium (Voltaren) 1 % gel gel Apply 4 g topically to the appropriate area as directed 4 (Four) Times a Day As Needed (shoulder pain). 400 g 2   • dicyclomine (Bentyl) 10 MG capsule Take 1 cap po q 8 hours prn spasm 60 capsule 0   • diphenhydrAMINE (BENADRYL) 25 mg capsule Take 25 mg by mouth Every 6 (Six) Hours As Needed for Itching.     • Eliquis 5 MG tablet tablet TAKE ONE TABLET BY MOUTH TWICE DAILY 180 tablet 0   • famotidine (PEPCID) 40 MG tablet Take 1 tablet by mouth 2 (Two) Times a Day. With lunch and at bedtime 180 tablet 3   • furosemide (LASIX) 20 MG tablet Take one tab daily every day.  Take 1 additional pill daily PRN LE swelling 180 tablet 1   • gabapentin (NEURONTIN) 400 MG capsule TAKE ONE CAPSULE BY MOUTH EVERY MORNING, AT NOON, AND TWO AT BEDTIME 120 capsule 2   • HYDROcodone-acetaminophen (NORCO) 5-325 MG per tablet TAKE ONE TABLET BY MOUTH every 12 hours AS NEEDED FOR SEVERE PAIN 30 tablet 0   • insulin aspart (novoLOG) 100 UNIT/ML injection Inject 5 Units under the skin into the appropriate area as directed 3 (Three) Times a Day With Meals.  12   • Insulin Degludec (Tresiba FlexTouch) 200 UNIT/ML solution pen-injector pen injection Inject 50 Units under the skin into the appropriate area as directed Every Night. 9 mL 11   • levoFLOXacin (LEVAQUIN) 250 MG tablet Take 250 mg by mouth Daily.     • levothyroxine (SYNTHROID, LEVOTHROID) 88 MCG tablet TAKE ONE TABLET BY MOUTH EVERY DAY 90 tablet 1   • midodrine (PROAMATINE) 2.5 MG tablet TAKE ONE TABLET BY MOUTH THREE TIMES DAILY 30 tablet 0   • Mirabegron ER (Myrbetriq) 25 MG tablet sustained-release 24 hour 24 hr tablet Take 1 tablet by mouth Daily. 30 tablet 5   • Nebulizer device 1 each Take As Directed. 1 each 0   • Needle, Disp, (BD DISP NEEDLES) 30G X 1/2\" misc To be used 3 times daily with Novolog " Flexpen. 100 each 5   • nitrofurantoin (MACRODANTIN) 100 MG capsule Take 100 mg by mouth Daily.     • nitrofurantoin, macrocrystal-monohydrate, (MACROBID) 100 MG capsule Take 100 mg by mouth Daily.     • nystatin (MYCOSTATIN) 519542 UNIT/GM cream Apply  topically to the appropriate area as directed 2 (two) times a day. 30 g 0   • O2 (OXYGEN) Inhale 2 L/min Every Night. Uses 2L  overnight only     • omeprazole (priLOSEC) 40 MG capsule Take 1 capsule by mouth 2 (two) times a day. 180 capsule 3   • ondansetron (ZOFRAN) 8 MG tablet Take 1 tablet by mouth 3 (Three) Times a Day As Needed for Nausea or Vomiting. 30 tablet 5   • phenazopyridine (PYRIDIUM) 200 MG tablet Take 200 mg by mouth 3 (Three) Times a Day.     • potassium chloride 10 MEQ CR tablet TAKE ONE TABLET BY MOUTH EVERY DAY 90 tablet 2   • sennosides-docusate (senna-docusate sodium) 8.6-50 MG per tablet Take 2 tablets by mouth Daily. 60 tablet 3   • UltiCare Mini Pen Needles 31G X 6 MM misc USE TO inject insulins FOUR TIMES DAILY AS DIRECTED 400 each 3   • Ventolin  (90 Base) MCG/ACT inhaler INHALE TWO PUFFS BY MOUTH EVERY 4 HOURS AS NEEDED FOR WHEEZING 18 g 3     Facility-Administered Medications Prior to Visit   Medication Dose Route Frequency Provider Last Rate Last Admin   • acetaminophen (TYLENOL) tablet 650 mg  650 mg Oral Q6H PRN Elieser Headley MD       • diphenhydrAMINE (BENADRYL) capsule 25 mg  25 mg Oral Once Elieser Headley MD       • lactated ringers infusion  100 mL/hr Intravenous Continuous Serafin Ferreira CRNA       • ondansetron (ZOFRAN) injection 4 mg  4 mg Intravenous Once PRN Serafin Ferreira CRNA         No orders of the defined types were placed in this encounter.    [unfilled]  There are no discontinued medications.      Return in about 1 month (around 6/26/2021) for Recheck.    Patient was given instructions and counseling regarding her condition or for health maintenance advice. Please see specific information  pulled into the AVS if appropriate.

## 2021-05-26 NOTE — NURSING NOTE
Pt received a 2 unit blood transfusion last week. Because hgb is 10.1 today, the pt was informed that she will not receive procrit and neupogen injections today. She was instructed to return as scheduled in one week for labs and possible injections. The pt and daughter-in-law v/u.     The pt explained that she has not been feeling well the last several days. She will be seeing her PCP later today for this reason. The pt's daughter-in-law stated that she thinks the pt may be depressed which is adding to her lack of energy.

## 2021-06-02 NOTE — PROGRESS NOTES
Subjective     REASON FOR FOLLOW-UP:   1.  Macrocytic anemia secondary to myelodysplastic syndrome with 5 q. minus and chronic kidney disease  2.  Monoclonal gammopathy of undetermined significance                             REQUESTING PHYSICIAN:  Dr. Baugh      History of Present Illness   Ms. Singh is a nikita 80-year-old woman returning today for follow-up of her myelodysplastic syndrome with anemia and borderline neutropenia.  The patient has been receiving Procrit combined with Neupogen on a weekly basis and has had a very good response in the hemoglobin.     The patient comes in today feeling somewhat poorly with nausea. She has mild back pain. She denies fevers or chills. She has chronic dysuria and frequency and is on suppressive antibiotic therapy for frequent UTIs.        Hematology History:  Patient presented as 78-year-old woman for evaluation of anemia.  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  She has stage III chronic kidney disease followed by Dr. Garza of nephrology.  Reviewing her records, the patient has had anemia present since at least 2014 but her hemoglobin has worsened over the past 6 months.  The patient was admitted to the hospital in October 2018 with symptomatic anemia, shortness of breath and lightheadedness.  She was found to have hemoglobin of 7.0.  There was concern for GI blood loss although EGD and colonoscopy performed showed no obvious etiology of blood loss.   She was transfused 7 units of packed red blood cells during the hospital stay.  I do not see any Hemoccult results.  She was previously on Eliquis which was discontinued.  Since discharge in October, the patient has been receiving weekly Procrit 20,000 units in the ACU at [place], but despite Procrit she has required transfusion on 2 separate occasions.  She is not seeing any bright red blood per rectum or melena.  She complains of  fatigue and lightheadedness.    Recent iron profile on 1/17/19 was inconsistent with iron deficiency with an iron saturation 68% and the ferritin was 352.  The red blood cells are macrocytic.  White blood cell and platelet counts have been normal.    The patient was seen in hematology on 1/29/19 and additional evaluation performed.  The reticulocyte count was elevated 3.72% but the haptoglobin and LDH were both normal arguing against a hemolytic process.  B12 and folic acid levels were normal.  Serum protein electrophoresis showed no M spike but the immunofixation identified a small IgA monoclonal protein with lambda specificity; IgA level 544.  Sedimentation rate elevated 58.  A free light chain ratio from the serum was normal 1.64.    The patient was referred for a bone marrow exam performed on 3/6/2019 which showed a cellularity of 35%.  There was erythroid hyperplasia with megaloblastoid changes, normal myeloid maturation, increased megakaryocytes with frequent micro megakaryocytes, scattered lymphoid aggregates.  There were morphologically normal plasma cells increased in #2 8% of the cellularity.  There were no increased blasts.  No increase in iron stores.  Karyotype showed a deletion 5 q. and 19 of 20 cells analyzed.    Patient was initiated on Procrit therapy with continued Red cell transfusion requirements despite maximum dose Procrit.  She attempted to take Revlimid on 2 occasions but developed rash despite dose reductions of Revlimid.    Combination Procrit plus Neupogen initiated 11/11/2020.    Past Medical History:   Diagnosis Date   • Allergic rhinitis    • Anemia    • Anxiety    • Appetite absent    • Arthritis    • Asthma    • Back pain    • Bell's palsy    • Black tarry stools    • Blood in stool    • Chronic fatigue    • CKD (chronic kidney disease) stage 3, GFR 30-59 ml/min (CMS/HCC)    • Community acquired pneumonia of left lung 10/25/2018   • Cough    • Depression    • Diabetes mellitus  (CMS/[facility])     LAST A1C 6   • Diabetic gastroparesis (CMS/[facility]) 2/19/2016   • Difficulty walking    • Excessive urination at night    • Frequent urination    • GERD (gastroesophageal reflux disease)    • GI bleed    • Gout    • H/O blood clots     LEFT LEG 7 OR 8 YEARS AGO   • Heat intolerance    • History of fall 10/2018   • History of prior pregnancies     x8, miscarriage 5   • History of transfusion 11/2018    due to anemia   • Hyperlipidemia    • Hypertension    • Hypothyroidism    • Normal coronary arteries     by cath 2013   • Orthostatic hypotension    • AARON (obstructive sleep apnea)     DOESNT WEAR REGULARLY   • Pneumonia    • PONV (postoperative nausea and vomiting)    • Skin cancer    • Stroke (CMS/[facility])     Several mini-strokes   • TIA (transient ischemic attack)     LAST TIA JULY 2017   • Urination pain    • UTI (urinary tract infection)     Dec 2018 and Jan 2019        Past Surgical History:   Procedure Laterality Date   • BACK SURGERY      HARDWARE   • CHOLECYSTECTOMY      OPEN   • COLONOSCOPY  2011    due for repeat in 2021   • COLONOSCOPY N/A 10/28/2018    Procedure: COLONOSCOPY;  Surgeon: Emmanuel Rogers MD;  Location: [facility] OR;  Service: Gastroenterology   • ENDOSCOPY N/A 10/26/2018    Procedure: ESOPHAGOGASTRODUODENOSCOPY;  Surgeon: Emmanuel Rogers MD;  Location: [facility] OR;  Service: Gastroenterology   • ENDOSCOPY N/A 5/13/2021    Procedure: ESOPHAGOGASTRODUODENOSCOPY, biopsy, dilatation;  Surgeon: Emmanuel Rogers MD;  Location: [facility] OR;  Service: Gastroenterology;  Laterality: N/A;  Esophagitis; Dilation- no stricture; Biopsy- esophagus   • HYSTERECTOMY      PARTIAL    • KNEE SURGERY     • NECK SURGERY     • SPINE SURGERY     • TUMOR REMOVAL Left     Leg   • UPPER GASTROINTESTINAL ENDOSCOPY  2014    gastritis.  done by dr. hastings        Current Outpatient Medications on File Prior to Visit   Medication Sig Dispense Refill   • ACCU-CHEK FASTCLIX LANCETS misc  TEST 3-4 TIMES DAILY AS DIRECTED 400 each 3   • ACCU-CHEK SMARTVIEW test strip TEST BLOOD SUGAR THREE TIMES DAILY OR AS DIRECTED 300 each 3   • acetaminophen (TYLENOL) 325 MG tablet Take 2 tablets by mouth Every 6 (Six) Hours As Needed for Mild Pain . OTC product 40 tablet 0   • albuterol (PROVENTIL) (2.5 MG/3ML) 0.083% nebulizer solution Take 2.5 mg by nebulization Every 4 (Four) Hours As Needed for Wheezing. 100 each 2   • atorvastatin (LIPITOR) 10 MG tablet TAKE ONE TABLET BY MOUTH AT BEDTIME 90 tablet 1   • Blood Glucose Monitoring Suppl (ACCU-CHEK KENNETH SMARTVIEW) w/Device kit TEST blood sugar three times daily or as directed 1 kit 0   • cyclobenzaprine (FLEXERIL) 10 MG tablet TAKE ONE TABLET BY MOUTH THREE TIMES DAILY as needed for muscle spasms 45 tablet 0   • desvenlafaxine (PRISTIQ) 50 MG 24 hr tablet TAKE ONE TABLET BY MOUTH EVERY DAY 90 tablet 1   • Diclofenac Sodium (Voltaren) 1 % gel gel Apply 4 g topically to the appropriate area as directed 4 (Four) Times a Day As Needed (shoulder pain). 400 g 2   • dicyclomine (Bentyl) 10 MG capsule Take 1 cap po q 8 hours prn spasm 60 capsule 0   • diphenhydrAMINE (BENADRYL) 25 mg capsule Take 25 mg by mouth Every 6 (Six) Hours As Needed for Itching.     • Eliquis 5 MG tablet tablet TAKE ONE TABLET BY MOUTH TWICE DAILY 180 tablet 0   • famotidine (PEPCID) 40 MG tablet Take 1 tablet by mouth 2 (Two) Times a Day. With lunch and at bedtime 180 tablet 3   • furosemide (LASIX) 20 MG tablet Take one tab daily every day.  Take 1 additional pill daily PRN LE swelling 180 tablet 1   • gabapentin (NEURONTIN) 400 MG capsule TAKE ONE CAPSULE BY MOUTH EVERY MORNING, AT NOON, AND TWO AT BEDTIME 120 capsule 2   • HYDROcodone-acetaminophen (NORCO) 5-325 MG per tablet TAKE ONE TABLET BY MOUTH every 12 hours AS NEEDED FOR SEVERE PAIN 30 tablet 0   • insulin aspart (novoLOG) 100 UNIT/ML injection Inject 5 Units under the skin into the appropriate area as directed 3 (Three) Times a Day  "With Meals.  12   • Insulin Degludec (Tresiba FlexTouch) 200 UNIT/ML solution pen-injector pen injection Inject 50 Units under the skin into the appropriate area as directed Every Night. 9 mL 11   • levoFLOXacin (LEVAQUIN) 250 MG tablet Take 250 mg by mouth Daily.     • levothyroxine (SYNTHROID, LEVOTHROID) 88 MCG tablet TAKE ONE TABLET BY MOUTH EVERY DAY 90 tablet 1   • midodrine (PROAMATINE) 2.5 MG tablet TAKE ONE TABLET BY MOUTH THREE TIMES DAILY 30 tablet 0   • Mirabegron ER (Myrbetriq) 25 MG tablet sustained-release 24 hour 24 hr tablet Take 1 tablet by mouth Daily. 30 tablet 5   • Nebulizer device 1 each Take As Directed. 1 each 0   • Needle, Disp, (BD DISP NEEDLES) 30G X 1/2\" misc To be used 3 times daily with Novolog Flexpen. 100 each 5   • nitrofurantoin (MACRODANTIN) 100 MG capsule Take 100 mg by mouth Daily.     • nitrofurantoin, macrocrystal-monohydrate, (MACROBID) 100 MG capsule Take 100 mg by mouth Daily.     • nystatin (MYCOSTATIN) 739667 UNIT/GM cream Apply  topically to the appropriate area as directed 2 (two) times a day. 30 g 0   • O2 (OXYGEN) Inhale 2 L/min Every Night. Uses 2L  overnight only     • omeprazole (priLOSEC) 40 MG capsule Take 1 capsule by mouth 2 (two) times a day. 180 capsule 3   • ondansetron (ZOFRAN) 8 MG tablet Take 1 tablet by mouth 3 (Three) Times a Day As Needed for Nausea or Vomiting. 30 tablet 5   • phenazopyridine (PYRIDIUM) 200 MG tablet Take 200 mg by mouth 3 (Three) Times a Day.     • potassium chloride 10 MEQ CR tablet TAKE ONE TABLET BY MOUTH EVERY DAY 90 tablet 2   • sennosides-docusate (senna-docusate sodium) 8.6-50 MG per tablet Take 2 tablets by mouth Daily. 60 tablet 3   • UltiCare Mini Pen Needles 31G X 6 MM misc USE TO inject insulins FOUR TIMES DAILY AS DIRECTED 400 each 3   • Ventolin  (90 Base) MCG/ACT inhaler INHALE TWO PUFFS BY MOUTH EVERY 4 HOURS AS NEEDED FOR WHEEZING 18 g 3     Current Facility-Administered Medications on File Prior to Visit "   Medication Dose Route Frequency Provider Last Rate Last Admin   • acetaminophen (TYLENOL) tablet 650 mg  650 mg Oral Q6H PRN Elieser Headley MD       • diphenhydrAMINE (BENADRYL) capsule 25 mg  25 mg Oral Once Elieser Headley MD       • lactated ringers infusion  100 mL/hr Intravenous Continuous Serafin Ferreira CRNA       • ondansetron (ZOFRAN) injection 4 mg  4 mg Intravenous Once PRN Serafin Ferreira CRNA            ALLERGIES:    Allergies   Allergen Reactions   • Baclofen Anxiety     Panic attack, nightmares   • Codeine Itching and Rash   • Lisinopril Cough   • Morphine Hives   • Penicillins Rash     Tolerates cephalosporins         Social History     Socioeconomic History   • Marital status:      Spouse name: Not on file   • Number of children: 3   • Years of education: High School   • Highest education level: Not on file   Tobacco Use   • Smoking status: Never Smoker   • Smokeless tobacco: Never Used   • Tobacco comment: CAFFEINE USE: NONE   Vaping Use   • Vaping Use: Never used   Substance and Sexual Activity   • Alcohol use: No   • Drug use: No   • Sexual activity: Defer     Comment: EXERCISE - RARELY        Family History   Problem Relation Age of Onset   • Lupus Mother    • Heart failure Mother 59   • Heart disease Other    • Hypertension Other    • Heart attack Father    • Breast cancer Neg Hx         Review of Systems   Constitutional: Positive for fatigue (improved). Negative for appetite change, fever and unexpected weight change.   HENT: Negative for congestion.    Respiratory: Positive for shortness of breath (with exertion). Negative for apnea and cough.    Gastrointestinal: Positive for nausea. Negative for abdominal pain, blood in stool and vomiting.   Genitourinary: Negative for dysuria, frequency and urgency.   Musculoskeletal: Positive for arthralgias (stable), back pain (stable) and gait problem (stable). Negative for neck stiffness.   Skin: Negative for rash.  "  Neurological: Positive for numbness. Negative for dizziness, tremors, speech difficulty and light-headedness.   Hematological: Negative.    Psychiatric/Behavioral: Negative.        Objective     Vitals:    06/02/21 1020   BP: 112/62   Pulse: 74   Resp: 14   Temp: 97.3 °F (36.3 °C)   TempSrc: Infrared   SpO2: 96%   Weight: Comment: unable to get wt   Height: 157.5 cm (62.01\")  Comment: unable to get new ht   PainSc: 10-Worst pain ever   PainLoc: Shoulder     Current Status 6/2/2021   ECOG score 2       Physical Exam    CON: pleasant well-developed somewhat chronic ill appearing woman, she is wearing a facemask.  She is seated in a wheelchair.  She is in no acute distress.  HEENT: no icterus, no thrush, moist membranes  NECK: no jvd  LYMPH: No cervical  lymphadenopathy  CV: RRR, S1S2, no murmur  RESP: Lungs clear to auscultation bilaterally, no wheezing noted.  MUSC: No edema noted.  Poor mobility, and a wheelchair  NEURO: alert and oriented x3, mild global weakness  PSYCH: normal mood and affect  Skin: No induration no petechiae.   Exam is unchanged-6/2/21  RECENT LABS:  Hematology WBC   Date Value Ref Range Status   05/26/2021 5.07 3.40 - 10.80 10*3/mm3 Final   01/12/2021 5.5 3.4 - 10.8 x10E3/uL Final     RBC   Date Value Ref Range Status   05/26/2021 3.49 (L) 3.77 - 5.28 10*6/mm3 Final   01/12/2021 3.14 (L) 3.77 - 5.28 x10E6/uL Final     Hemoglobin   Date Value Ref Range Status   05/26/2021 10.1 (L) 12.0 - 15.9 g/dL Final     Hematocrit   Date Value Ref Range Status   05/26/2021 32.3 (L) 34.0 - 46.6 % Final     Platelets   Date Value Ref Range Status   05/26/2021 315 140 - 450 10*3/mm3 Final          Assessment/Plan     1. Macrocytic anemia secondary to myelodysplastic syndrome with 5 q. 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  In addition, the patient has chronic kidney disease, stage III.      Patient was initiated on Procrit therapy with continued " red cell transfusion requirements despite maximum dose Procrit.  She attempted to take Revlimid on 2 occasions but developed rash despite dose reductions of Revlimid.    Patient initiated Procrit combined with Neupogen weekly on 11/11/2020.  She has had a very good partial response with decrease transfusion requirements and improved ANC.  We will continue weekly CBC and Procrit/Neupogen.      She requires transfusion for hemoglobin 8.0 or less and receives Lasix between units of blood.      Hemoglobin is 9.4 today and the patient will be given Procrit/Neupogen. Continue weekly CBC and Procrit/Neupogen.    2.  Monoclonal gammopathy of undetermined significance: The patient's bone marrow showed slight increase in plasma cells 8% of the cellularity but normal in morphology.  She has an IgA monoclonality on her immunofixation but no measurable M spike and a normal free light chain ratio.   Repeat studies 5/16/2019 showed a stable IgA 509 with a normal light chain ratio, no M spike.  Studies performed 10/24/2019 show stable IgA at 491, no M spike with mildly increased kappa/lambda light chain ratio related to her CKD.  Repeat studies 9/16/2020 stable.  Repeat studies 12-20 showed no measurable M spike, free light chain ratio 2.46, immunofixation IgA lambda (gA level 433)-all stable  I will repeat her SPEP, RANJANA, free light chain ratio today.    3.  Chronic kidney disease, stage III which contributes to anemia.      4.  Transfusional iron overload.  The patient has poor performance status but low-grade MDS and may benefit from iron chelation but complicated by her CKD.  Iron levels/ferritin drawn today-pending    5.  Chronic pain.  Patient's pain in shoulders is chronic in nature.    6. Nausea/flank pain: I will check a urinalysis and culture to make sure she has not developed a urinary tract infection

## 2021-06-04 NOTE — TELEPHONE ENCOUNTER
----- Message from Elieser Headley MD sent at 6/4/2021  8:21 AM EDT -----  Can you check to see how this pt is feeling and if any urinary symptoms, back pain, fevers chills etc.  Also she if she has a urologist.  ----- Message -----  From: Lab, Background User  Sent: 6/2/2021  11:25 AM EDT  To: Elieser Headley MD

## 2021-06-04 NOTE — TELEPHONE ENCOUNTER
Called and spoke with Mayra(relative) regarding pt's urinary symptoms, she reports no new symptoms, pt is actually feeling better since her office visit with Dr Headley.  Denies fever, chills or worsening pain.  Pt does see a urology Dr Fall and is on life long Macrobid and Mytbetriq for urinary frequency and infection.  Educated to call for new or worsening symptoms, otherwise she could also check in with Dr Fall, they v/u.  Dr Headley updated

## 2021-06-04 NOTE — TELEPHONE ENCOUNTER
Spoke with patient's daughter re: UTI being resistant to macrobid (which her urologist has her on due to frequent UTI). Per Dr. Headley, will route omnicef 300mg daily x 7 days.

## 2021-06-09 NOTE — PATIENT INSTRUCTIONS
"Call Dr. Elieser Headley, [facility] at [phone] if you have any problems or concerns.    We know you have a Choice in healthcare and appreciate you using [facility].  Our purpose is to provide you \"Excellent Care\".  We hope that you will always choose us in the future and continue to recommend us to your family and friends.         Blood Transfusion, Adult, Care After  This sheet gives you information about how to care for yourself after your procedure. Your health care provider may also give you more specific instructions. If you have problems or questions, contact your health care provider.  What can I expect after the procedure?  After the procedure, it is common to have:  · Bruising and soreness where the IV was inserted.  · A fever or chills on the day of the procedure. This may be your body's response to the new blood cells received.  · A headache.  Follow these instructions at home:  IV insertion site care         · Follow instructions from your health care provider about how to take care of your IV insertion site. Make sure you:  ? Wash your hands with soap and water before and after you change your bandage (dressing). If soap and water are not available, use hand .  ? Change your dressing as told by your health care provider.  · Check your IV insertion site every day for signs of infection. Check for:  ? Redness, swelling, or pain.  ? Bleeding from the site.  ? Warmth.  ? Pus or a bad smell.  General instructions  · Take over-the-counter and prescription medicines only as told by your health care provider.  · Rest as told by your health care provider.  · Return to your normal activities as told by your health care provider.  · Keep all follow-up visits as told by your health care provider. This is important.  Contact a health care provider if:  · You have itching or red, swollen areas of skin (hives).  · You feel anxious.  · You feel weak after doing your normal activities.  · You " have redness, swelling, warmth, or pain around the IV insertion site.  · You have blood coming from the IV insertion site that does not stop with pressure.  · You have pus or a bad smell coming from your IV insertion site.  Get help right away if:  · You have symptoms of a serious allergic or immune system reaction, including:  ? Trouble breathing or shortness of breath.  ? Swelling of the face or feeling flushed.  ? Fever or chills.  ? Pain in the head, back, or chest.  ? Dark urine or blood in the urine.  ? Widespread rash.  ? Fast heartbeat.  ? Feeling dizzy or light-headed.  If you receive your blood transfusion in an outpatient setting, you will be told whom to contact to report any reactions.  These symptoms may represent a serious problem that is an emergency. Do not wait to see if the symptoms will go away. Get medical help right away. Call your local emergency services (911 in the [place] Do not drive yourself to the hospital.  Summary  · Bruising and tenderness around the IV insertion site are common.  · Check your IV insertion site every day for signs of infection.  · Rest as told by your health care provider. Return to your normal activities as told by your health care provider.  · Get help right away for symptoms of a serious allergic or immune system reaction to blood transfusion.  This information is not intended to replace advice given to you by your health care provider. Make sure you discuss any questions you have with your health care provider.  Document Revised: 06/11/2020 Document Reviewed: 06/11/2020  [employer] Patient Education © 2021 [employer]

## 2021-06-09 NOTE — NURSING NOTE
HGB 7.9 today.  Pt feeling better since on omnicef for UTI but with fatigue.  Pt T&S for 2 units PRBC'S and BB armband placed on patient.  Pt to receive transfusion tomorrow in [place] [facility].

## 2021-06-10 NOTE — NURSING NOTE
1400 Patient on ACC today & received 2 units of PRBC's without s/s of reaction. Patient stayed in bed for transfusion, assist up to br prn. Oxygen on @ 2 l nc today d/t marginal sats @ 89-90. With o2 on, sats 96%.  Pt. Dis eat some lunch here, discharged home via w/c.

## 2021-06-10 NOTE — PROGRESS NOTES
RM:________     PCP: Chair Mercado MD    : 1940  AGE: 81 y.o.  EST PATIENT   REASON FOR VISIT/  CC:    BP Readings from Last 3 Encounters:   06/10/21 115/65   21 112/62   21 100/60        WT: ____________ BP: __________L __________R HR______    CHEST PAIN: _____________    SOA: _____________PALPS: _______________     LIGHTHEADED: ___________FATIGUE: ________________ EDEMA __________    ALLERGIES:Baclofen, Codeine, Lisinopril, Morphine, and Penicillins SMOKING HISTORY:  Social History     Tobacco Use   • Smoking status: Never Smoker   • Smokeless tobacco: Never Used   • Tobacco comment: CAFFEINE USE: NONE   Vaping Use   • Vaping Use: Never used   Substance Use Topics   • Alcohol use: No   • Drug use: No     CAFFEINE USE_________________  ALCOHOL ______________________    Below is the patient's most recent value for Albumin, ALT, AST, BUN, Calcium, Chloride, Cholesterol, CO2, Creatinine, GFR, Glucose, HDL, Hematocrit, Hemoglobin, Hemoglobin A1C, LDL, Magnesium, Phosphorus, Platelets, Potassium, PSA, Sodium, Triglycerides, TSH and WBC.   Lab Results   Component Value Date    ALBUMIN 3.90 2021    ALBUMIN 3.4 2021    ALT 7 2021    AST 8 2021    BUN 23 2021    CALCIUM 9.0 2021     2021    CO2 26.8 2021    CREATININE 1.96 (H) 2021    GLU 62 (L) 2021    HDL 42 2021    HCT 25.1 (L) 2021    HGB 7.9 (L) 2021    HGBA1C 6.1 (H) 2021    LDL 36 2021    MG 2.1 2020    PHOS 4.7 (H) 2020     2021    K 4.5 2021     2021    TRIG 57 2021    TSH 4.270 2021    WBC 2.77 (L) 2021          NEW DIAGNOSIS/ SURGERY/ HOSP OR ED VISITS: ______________________    __________________________________________________________________      RECENT LABS OR DIAGNOSTIC TESTING:   _____________________________    __________________________________________________________________      ASSESSMENT/ PLAN: _______________________________________________    __________________________________________________________________

## 2021-06-15 PROBLEM — M77.12 LATERAL EPICONDYLITIS, LEFT ELBOW: Status: RESOLVED | Noted: 2020-10-06 | Resolved: 2021-01-01

## 2021-06-15 PROBLEM — R19.7 DIARRHEA: Status: RESOLVED | Noted: 2021-01-01 | Resolved: 2021-01-01

## 2021-06-15 PROBLEM — R55 SYNCOPE: Status: RESOLVED | Noted: 2020-08-12 | Resolved: 2021-01-01

## 2021-06-15 PROBLEM — R41.0 CONFUSION: Status: RESOLVED | Noted: 2020-07-29 | Resolved: 2021-01-01

## 2021-06-15 PROBLEM — E66.01 MORBIDLY OBESE (HCC): Status: RESOLVED | Noted: 2020-01-29 | Resolved: 2021-01-01

## 2021-06-15 PROBLEM — R10.13 EPIGASTRIC PAIN: Status: RESOLVED | Noted: 2021-01-01 | Resolved: 2021-01-01

## 2021-06-16 NOTE — NURSING NOTE
Procrit and Neupogen injections were held today for hgb of 10.5. The pt was informed and v/u. She states that she is feeling much stronger this week following last week's blood transfusion.

## 2021-06-18 NOTE — TELEPHONE ENCOUNTER
Last filled  04/30/21  Last seen  05/26/21  Controlled substance not UTD  Last contract 06/12/2017

## 2021-06-23 NOTE — NURSING NOTE
Lab Results   Component Value Date    WBC 5.48 06/23/2021    HGB 10.8 (L) 06/23/2021    HCT 34.9 06/23/2021    MCV 92.8 06/23/2021     06/23/2021     Patient here today for Procrit/Neupogen injection. Hgb today 10.8, per orders injections to be held for Hgb > 10. Patient with reports of mild fatigue, but no other complaints or concerns. Labs reviewed with patient, all questions answered. Follow up appointments reviewed, pt instructed to call office for any questions or concerns. Understanding verbalized.

## 2021-06-30 NOTE — PROGRESS NOTES
Subjective:     Patient ID: Joya Singh is a 81 y.o. female.    Chief Complaint:  Follow-up DJD left shoulder   Subacromial bursitis, left shoulder   History of Present Illness  Joya Singh returns to clinic with daughter-in-law for follow-up of her left shoulder.  Continues to experience pain which radiates from the lateral side of her neck down the right left upper extremity down into the forearm.  Has had surgical intervention of the cervical spine in the past outside provider cervical fusion experiencing numbness and tingling as well as pain shooting down the left upper extremity.  Is continued to receive corticosteroid injections left shoulder with minimal symptom relief.  Pain is severe and relieved with rest, ice, steroid injections, exercises as noted significant decreased strength of the left upper extremity.  Denies any recent x-ray of the C-spine denies any recent MRI.  She is continued taking muscle relaxers unable to tolerate oral NSAIDs again with minimal symptom relief.  Denies other concerns present time.     Social History     Occupational History     Employer: RETIRED   Tobacco Use   • Smoking status: Never Smoker   • Smokeless tobacco: Never Used   • Tobacco comment: CAFFEINE USE: NONE   Vaping Use   • Vaping Use: Never used   Substance and Sexual Activity   • Alcohol use: No   • Drug use: No   • Sexual activity: Defer     Comment: EXERCISE - RARELY      Past Medical History:   Diagnosis Date   • Allergic rhinitis    • Anemia    • Anxiety    • Appetite absent    • Arthritis    • Asthma    • Back pain    • Bell's palsy    • Black tarry stools    • Blood in stool    • Chronic fatigue    • CKD (chronic kidney disease) stage 3, GFR 30-59 ml/min (CMS/Formerly [facility])    • Community acquired pneumonia of left lung 10/25/2018   • Cough    • Depression    • Diabetes mellitus (CMS/Formerly [facility])     LAST A1C 6   • Diabetic gastroparesis (CMS/Formerly [facility]) 2/19/2016   • Difficulty walking    • Excessive urination at night    • Frequent  urination    • GERD (gastroesophageal reflux disease)    • GI bleed    • Gout    • H/O blood clots     LEFT LEG 7 OR 8 YEARS AGO   • Heat intolerance    • History of fall 10/2018   • History of prior pregnancies     x8, miscarriage 5   • History of transfusion 11/2018    due to anemia   • Hyperlipidemia    • Hypertension    • Hypothyroidism    • Normal coronary arteries     by cath 2013   • Orthostatic hypotension    • AARON (obstructive sleep apnea)     DOESNT WEAR REGULARLY   • Pneumonia    • PONV (postoperative nausea and vomiting)    • Skin cancer    • Stroke (CMS/HCC)     Several mini-strokes   • TIA (transient ischemic attack)     LAST TIA JULY 2017   • Urination pain    • UTI (urinary tract infection)     Dec 2018 and Jan 2019     Past Surgical History:   Procedure Laterality Date   • BACK SURGERY      HARDWARE   • CHOLECYSTECTOMY      OPEN   • COLONOSCOPY  2011    due for repeat in 2021   • COLONOSCOPY N/A 10/28/2018    Procedure: COLONOSCOPY;  Surgeon: Emmanuel Rogers MD;  Location: [facility] OR;  Service: Gastroenterology   • ENDOSCOPY N/A 10/26/2018    Procedure: ESOPHAGOGASTRODUODENOSCOPY;  Surgeon: Emmanuel Rogers MD;  Location: [facility] OR;  Service: Gastroenterology   • ENDOSCOPY N/A 5/13/2021    Procedure: ESOPHAGOGASTRODUODENOSCOPY, biopsy, dilatation;  Surgeon: Emmanuel Rogers MD;  Location: [facility] OR;  Service: Gastroenterology;  Laterality: N/A;  Esophagitis; Dilation- no stricture; Biopsy- esophagus   • HYSTERECTOMY      PARTIAL    • KNEE SURGERY     • NECK SURGERY     • SPINE SURGERY     • TUMOR REMOVAL Left     Leg   • UPPER GASTROINTESTINAL ENDOSCOPY  2014    gastritis.  done by dr. hastings       Family History   Problem Relation Age of Onset   • Lupus Mother    • Heart failure Mother 59   • Heart disease Other    • Hypertension Other    • Heart attack Father    • Breast cancer Neg Hx        Objective:  Physical Exam    General: No acute distress.  Eyes:  "conjunctiva clear; pupils equally round and reactive  ENT: external ears and nose atraumatic; oropharynx clear  CV: no peripheral edema  Resp: normal respiratory effort  Skin: no rashes or wounds; normal turgor  Psych: mood and affect appropriate; recent and remote memory intact    Vitals:    06/30/21 0809   Weight: 83 kg (183 lb)   Height: 157.5 cm (62\")         06/30/21  0809   Weight: 83 kg (183 lb)     Body mass index is 33.47 kg/m².      Back Exam     Tenderness   The patient is experiencing tenderness in the cervical.    Comments:  Positive Spurling's exam bilaterally  Positive sensation light touch all distributions bilateral upper extremities        Left Shoulder Exam     Range of Motion   External rotation: 50   Forward flexion: 160     Muscle Strength   Internal rotation: 3/5   External rotation: 3/5   Supraspinatus: 3/5   Subscapularis: 3/5   Biceps: 3/5     Tests   Mejia test: positive  Cross arm: positive  Impingement: positive    Other   Erythema: absent  Sensation: normal  Pulse: present     Comments:  Internal rotation to side  positive empty can  positive [place]'s  positive Speed's  positive bear hug exam                   Imaging:  Cervical Spine X-Ray    Indication: Pain  Views: AP and Lateral    Findings:  No fracture  No bony lesions  Soft tissues normal  Evidence of prior C-spine fixation, appears fusion at C5-C6 hardware remains intact, narrowing noted C4-C5 C6-C7    No prior studies are available for comparison.    Assessment:        1. Pain    2. Chronic radicular cervical pain    3. Subacromial bursitis of left shoulder joint    4. Primary localized osteoarthrosis of left shoulder region           Plan:  Discussed plan of care with patient and daughter.  We will proceed with corticosteroid injection subacromial bursa however I do believe that this severity of pain is not coming from the neck.  I do recommend MRI of the cervical spine to evaluate.  She does have appointment with pain " management per her primary care who is also concerned about the severity of pain at the shoulder not improving with shoulder treatment.  Plan to call her with results as soon as they are provided.  Patient and daughter-in-law verbalized understanding of information agrees with plan of care.  Denies other concerns present this time.  Large Joint Arthrocentesis: L subacromial bursa  Date/Time: 6/30/2021 8:49 AM  Consent given by: patient  Site marked: site marked  Timeout: Immediately prior to procedure a time out was called to verify the correct patient, procedure, equipment, support staff and site/side marked as required   Supporting Documentation  Indications: pain   Procedure Details  Location: shoulder - L subacromial bursa  Preparation: Patient was prepped and draped in the usual sterile fashion  Needle size: 22 G  Medications administered: 12 mg betamethasone acetate-betamethasone sodium phosphate 6 (3-3) MG/ML; 4 mL lidocaine PF 1% 1 %  Patient tolerance: patient tolerated the procedure well with no immediate complications        Orders:  Orders Placed This Encounter   Procedures   • Large Joint Arthrocentesis: L subacromial bursa   • XR Spine Cervical 2 View   • MRI Cervical Spine Without Contrast       Medications:  No orders of the defined types were placed in this encounter.      Followup:  No follow-ups on file.    Diagnoses and all orders for this visit:    1. Pain (Primary)  -     XR Spine Cervical 2 View    2. Chronic radicular cervical pain  -     MRI Cervical Spine Without Contrast; Future    3. Subacromial bursitis of left shoulder joint  -     Large Joint Arthrocentesis: L subacromial bursa    4. Primary localized osteoarthrosis of left shoulder region  -     Large Joint Arthrocentesis: L subacromial bursa        Dictated utilizing Dragon dictation

## 2021-06-30 NOTE — PROGRESS NOTES
Joya Singh is a 81 y.o. female, who presents with a chief complaint of   Chief Complaint   Patient presents with   • Diabetes   • Cough     x 3-4 days            HPI   Pt here for follow up.  Hx from pt and her daughter in law who is her primary care giver.     Her main complaint today is a rash in her groin.  Her insurance wont cover nystatin powder.  The nystatin cream helps but keeps the area moist.  rec trying miconazole powder otc.     Cough - pt has been coughing for a few days.  Hx aspiration.  No recent albuterol use.      MDS-Follows with oncology and on procrit/neupogen. Her last blood transfusion was 2 units last week.  hgb today 11.2.  She is requiring blood transfusions 2 units q 2-3 weeks.       DM2-  She says most glucoses 120-170.  highest recent glucose in 200s and lowest 98.  no recent a1c bc pt has had multiple recent blood transfusions.  She was taking 54 u Tresiba nightly and was prescribed 5u meal time insulin.  They were confused on directions and started taking novolg tid and tresiba prn.  We discussed taking a lower dose of tresiba every day and using novolog as a supplement when glucose >150.   Their entire home has been trying to cut out more sugar.      She has diabetic retinopathy in her left eye and has had to have shots in them per opthalmology  She has proteinuria/ckd stage 4/nephropathy as well.     She saw dr. Fall and was put on mybertriq for OAB.  She has also been drinking a glass of cranberry juice a day.  The new med has really helped her urgency.  She is on macrobid daily for UTI PPX.     HH is seeing pt with PT/OT in her home     Pt saw dr. Rogers and had an egd done 5/13.  She had a balloon dilation.  She feels her swallowing is about the same.  She is on omeprazole bid and famotidine bid.       She has had worsening depression. Her DIL says she stays in her room a lot. Pt has had her vaccines and family is trying to get her out of the house.  Usually pt will  interact with others but lately she has been more to herself.  She says she is self contious about eating around others.  I offered behavioral health/counseling but pt declines.  She does like working with her flowers and she has some inside and in containers she can work with.  She is on pristiq.      The following portions of the patient's history were reviewed and updated as appropriate: allergies, current medications, past family history, past medical history, past social history, past surgical history and problem list.    Allergies: Baclofen, Codeine, Lisinopril, Morphine, and Penicillins    Review of Systems   Constitutional: Negative.    HENT: Negative.    Eyes: Negative.    Respiratory: Negative.    Cardiovascular: Negative.    Gastrointestinal: Negative.    Endocrine: Negative.    Genitourinary: Negative.    Musculoskeletal: Negative.    Skin: Negative.    Allergic/Immunologic: Negative.    Neurological: Negative.    Hematological: Negative.    Psychiatric/Behavioral: Negative.    All other systems reviewed and are negative.            Wt Readings from Last 3 Encounters:   06/30/21 87.2 kg (192 lb 4.8 oz)   06/30/21 83 kg (183 lb)   06/15/21 83 kg (183 lb)     Temp Readings from Last 3 Encounters:   06/30/21 96.8 °F (36 °C) (Temporal)   06/10/21 97.7 °F (36.5 °C) (Temporal)   06/09/21 97.4 °F (36.3 °C) (Infrared)     BP Readings from Last 3 Encounters:   06/30/21 120/75   06/15/21 110/60   06/10/21 118/64     Pulse Readings from Last 3 Encounters:   06/30/21 90   06/15/21 81   06/10/21 87     Body mass index is 35.16 kg/m².  SpO2 Readings from Last 3 Encounters:   06/30/21 94%   06/15/21 99%   06/10/21 96%          Physical Exam  Vitals and nursing note reviewed.   Constitutional:       General: She is not in acute distress.     Appearance: She is well-developed.   HENT:      Head: Normocephalic and atraumatic.      Right Ear: External ear normal.      Left Ear: External ear normal.      Nose: Nose normal.    Eyes:      Conjunctiva/sclera: Conjunctivae normal.      Pupils: Pupils are equal, round, and reactive to light.   Cardiovascular:      Rate and Rhythm: Normal rate and regular rhythm.      Heart sounds: Normal heart sounds.   Pulmonary:      Effort: Pulmonary effort is normal. No respiratory distress.      Breath sounds: Normal breath sounds. No wheezing.   Musculoskeletal:      Cervical back: Normal range of motion and neck supple.      Right lower leg: No edema.      Left lower leg: No edema.      Comments: In wheelchair   Skin:     General: Skin is warm and dry.   Neurological:      Mental Status: She is alert and oriented to person, place, and time.   Psychiatric:         Behavior: Behavior normal.         Thought Content: Thought content normal.         Judgment: Judgment normal.         Results for orders placed or performed in visit on 06/30/21   CBC Auto Differential    Specimen: Blood   Result Value Ref Range    WBC 7.27 3.40 - 10.80 10*3/mm3    RBC 3.87 3.77 - 5.28 10*6/mm3    Hemoglobin 11.2 (L) 12.0 - 15.9 g/dL    Hematocrit 36.2 34.0 - 46.6 %    MCV 93.5 79.0 - 97.0 fL    MCH 28.9 26.6 - 33.0 pg    MCHC 30.9 (L) 31.5 - 35.7 g/dL    RDW 21.6 (H) 12.3 - 15.4 %    RDW-SD 71.5 (H) 37.0 - 54.0 fl    MPV 12.1 (H) 6.0 - 12.0 fL    Platelets 300 140 - 450 10*3/mm3    Neutrophil % 51.5 42.7 - 76.0 %    Lymphocyte % 34.1 19.6 - 45.3 %    Monocyte % 5.6 5.0 - 12.0 %    Eosinophil % 5.1 0.3 - 6.2 %    Basophil % 1.8 (H) 0.0 - 1.5 %    Immature Grans % 1.9 (H) 0.0 - 0.5 %    Neutrophils, Absolute 3.74 1.70 - 7.00 10*3/mm3    Lymphocytes, Absolute 2.48 0.70 - 3.10 10*3/mm3    Monocytes, Absolute 0.41 0.10 - 0.90 10*3/mm3    Eosinophils, Absolute 0.37 0.00 - 0.40 10*3/mm3    Basophils, Absolute 0.13 0.00 - 0.20 10*3/mm3    Immature Grans, Absolute 0.14 (H) 0.00 - 0.05 10*3/mm3    nRBC 0.3 (H) 0.0 - 0.2 /100 WBC     *Note: Due to a large number of results and/or encounters for the requested time period, some results  have not been displayed. A complete set of results can be found in Results Review.     Result Review :                  Assessment and Plan    Diagnoses and all orders for this visit:    1. Myelodysplastic syndrome with 5q deletion (CMS/HCC) (Primary) - continuing with weekly fu with heme onc.     2. Depression with anxiety - encouraged pt to get up and try to take some control in her life.  Cont current meds.     3. Type 2 diabetes mellitus with diabetic neuropathy, with long-term current use of insulin (CMS/HCC) - overall glucoses doing better with family understanding insulin regimen better.     4. Anemia requiring transfusions - 2/2 MDS    5. Cutaneous candidiasis - miconazole topical powder.  Discussed skin care including using hair dryer to get skin folds dry.     6. Aspiration syndrome - hx aspiration. Pt with new cough. No congestion. Start albuterol and check cxr.   Further treatment based on cxr result.              Outpatient Medications Prior to Visit   Medication Sig Dispense Refill   • ACCU-CHEK FASTCLIX LANCETS misc TEST 3-4 TIMES DAILY AS DIRECTED 400 each 3   • ACCU-CHEK SMARTVIEW test strip TEST BLOOD SUGAR THREE TIMES DAILY OR AS DIRECTED 300 each 3   • acetaminophen (TYLENOL) 325 MG tablet Take 2 tablets by mouth Every 6 (Six) Hours As Needed for Mild Pain . OTC product 40 tablet 0   • albuterol (PROVENTIL) (2.5 MG/3ML) 0.083% nebulizer solution Take 2.5 mg by nebulization Every 4 (Four) Hours As Needed for Wheezing. 100 each 2   • apixaban (Eliquis) 5 MG tablet tablet Take 1 tablet by mouth 2 (Two) Times a Day. 180 tablet 1   • atorvastatin (LIPITOR) 10 MG tablet TAKE ONE TABLET BY MOUTH AT BEDTIME 90 tablet 1   • Blood Glucose Monitoring Suppl (ACCU-CHEK KENNETH SMARTVIEW) w/Device kit TEST blood sugar three times daily or as directed 1 kit 0   • cyclobenzaprine (FLEXERIL) 10 MG tablet TAKE ONE TABLET BY MOUTH THREE TIMES DAILY as needed for muscle spasms 45 tablet 0   • desvenlafaxine (PRISTIQ)  "50 MG 24 hr tablet TAKE ONE TABLET BY MOUTH EVERY DAY 90 tablet 1   • Diclofenac Sodium (Voltaren) 1 % gel gel Apply 4 g topically to the appropriate area as directed 4 (Four) Times a Day As Needed (shoulder pain). 400 g 2   • dicyclomine (Bentyl) 10 MG capsule Take 1 cap po q 8 hours prn spasm 60 capsule 0   • famotidine (PEPCID) 40 MG tablet Take 1 tablet by mouth 2 (Two) Times a Day. With lunch and at bedtime 180 tablet 3   • furosemide (LASIX) 20 MG tablet TAKE ONE TABLET BY MOUTH EVERY DAY Take ONE additional pill daily as needed for swelling 180 tablet 1   • gabapentin (NEURONTIN) 400 MG capsule TAKE ONE CAPSULE BY MOUTH EVERY MORNING, AT NOON, AND TWO AT BEDTIME 120 capsule 2   • HYDROcodone-acetaminophen (NORCO) 5-325 MG per tablet TAKE ONE TABLET BY MOUTH every 12 hours AS NEEDED FOR SEVERE PAIN 30 tablet 0   • insulin aspart (novoLOG) 100 UNIT/ML injection Inject 5 Units under the skin into the appropriate area as directed 3 (Three) Times a Day With Meals.  12   • levoFLOXacin (LEVAQUIN) 250 MG tablet Take 250 mg by mouth Daily.     • levothyroxine (SYNTHROID, LEVOTHROID) 88 MCG tablet TAKE ONE TABLET BY MOUTH EVERY DAY 90 tablet 1   • loratadine (Claritin) 10 MG tablet Take 10 mg by mouth Daily.     • midodrine (PROAMATINE) 2.5 MG tablet TAKE ONE TABLET BY MOUTH THREE TIMES DAILY 270 tablet 3   • Mirabegron ER (Myrbetriq) 25 MG tablet sustained-release 24 hour 24 hr tablet Take 1 tablet by mouth Daily. 30 tablet 5   • Nebulizer device 1 each Take As Directed. 1 each 0   • Needle, Disp, (BD DISP NEEDLES) 30G X 1/2\" misc To be used 3 times daily with Novolog Flexpen. 100 each 5   • nitrofurantoin, macrocrystal-monohydrate, (MACROBID) 100 MG capsule Take 100 mg by mouth Daily.     • nitrofurantoin, macrocrystal-monohydrate, (MACROBID) 100 MG capsule Take 100 mg by mouth 2 (Two) Times a Day.     • nystatin (MYCOSTATIN) 927202 UNIT/GM cream Apply  topically to the appropriate area as directed 2 (two) times a " day. 30 g 0   • O2 (OXYGEN) Inhale 2 L/min Every Night. Uses 2L  overnight only     • omeprazole (priLOSEC) 40 MG capsule Take 1 capsule by mouth 2 (two) times a day. 180 capsule 3   • ondansetron (ZOFRAN) 8 MG tablet Take 1 tablet by mouth 3 (Three) Times a Day As Needed for Nausea or Vomiting. 30 tablet 5   • phenazopyridine (PYRIDIUM) 200 MG tablet Take 200 mg by mouth 3 (Three) Times a Day.     • potassium chloride 10 MEQ CR tablet TAKE ONE TABLET BY MOUTH EVERY DAY 90 tablet 2   • sennosides-docusate (senna-docusate sodium) 8.6-50 MG per tablet Take 2 tablets by mouth Daily. 60 tablet 3   • Tresiba FlexTouch 200 UNIT/ML solution pen-injector pen injection INJECT 54 UNITS subcutaneously AT BEDTIME 9 mL 11   • UltiCare Mini Pen Needles 31G X 6 MM misc USE TO inject insulins FOUR TIMES DAILY AS DIRECTED 400 each 3   • Ventolin  (90 Base) MCG/ACT inhaler INHALE TWO PUFFS BY MOUTH EVERY 4 HOURS AS NEEDED FOR WHEEZING 18 g 3     Facility-Administered Medications Prior to Visit   Medication Dose Route Frequency Provider Last Rate Last Admin   • acetaminophen (TYLENOL) tablet 650 mg  650 mg Oral Q6H PRN Elieser Headley MD       • diphenhydrAMINE (BENADRYL) capsule 25 mg  25 mg Oral Once Elieser Headley MD       • lactated ringers infusion  100 mL/hr Intravenous Continuous Serafin Ferreira CRNA       • ondansetron (ZOFRAN) injection 4 mg  4 mg Intravenous Once PRN eSrafin Ferreira CRNA         No orders of the defined types were placed in this encounter.    [unfilled]  There are no discontinued medications.      Return in about 1 month (around 7/30/2021) for Recheck.    Patient was given instructions and counseling regarding her condition or for health maintenance advice. Please see specific information pulled into the AVS if appropriate.

## 2021-06-30 NOTE — NURSING NOTE
Lab Results   Component Value Date    WBC 7.27 06/30/2021    HGB 11.2 (L) 06/30/2021    HCT 36.2 06/30/2021    MCV 93.5 06/30/2021     06/30/2021     Reviewed labs with patient. Per therapy plan hold procrit for Hgb > 10. Patient verbalized understanding. Patient with no new complaints or concerns today. Reviewed follow up appointments with patient. Pt instructed to call office for any issues, understanding verbalized.

## 2021-07-01 NOTE — TELEPHONE ENCOUNTER
Hub please inform patients, the results of   cxr - concern for developing pneumonia.  Will send in antibiotics.  Check to see if pt taking levaquin daily.  If so she should hold levaquin while on moxifloxacin.     LVM on mobile number.

## 2021-07-15 NOTE — PROGRESS NOTES
Chief Complaint   Patient presents with   • Dizziness       Subjective     Joya Singh is a 81 y.o. female being seen for an acute visit for possible UTI. She is a patient of Kathy Mercado's whom I have never seen before. She is accompanied by her daughter-in-law, Basia, whom she is staying with while her daughter, Mayra is on vacation out of town. Lolis is reporting chronic dizziness, fatigue, chest congestion, SOA, and coughing.  She feels like these chronic symptoms have felt worse the past week. She deneis leg swelling, fever, diarrhea, abd pain, urinary changes. She has not been using her oxygen at night or her nebulizer at home. 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 She is followed by Dr. Headley (hemon), last CBC showed HGB 9.8.      History of Present Illness     Allergies   Allergen Reactions   • Baclofen Anxiety     Panic attack, nightmares   • Codeine Itching and Rash   • Lisinopril Cough   • Morphine Hives   • Penicillins Rash     Tolerates cephalosporins          Current Outpatient Medications:   •  ACCU-CHEK FASTCLIX LANCETS misc, TEST 3-4 TIMES DAILY AS DIRECTED, Disp: 400 each, Rfl: 3  •  ACCU-CHEK SMARTVIEW test strip, TEST BLOOD SUGAR THREE TIMES DAILY OR AS DIRECTED, Disp: 300 each, Rfl: 3  •  acetaminophen (TYLENOL) 325 MG tablet, Take 2 tablets by mouth Every 6 (Six) Hours As Needed for Mild Pain . OTC product, Disp: 40 tablet, Rfl: 0  •  albuterol (PROVENTIL) (2.5 MG/3ML) 0.083% nebulizer solution, Take 2.5 mg by nebulization Every 4 (Four) Hours As Needed for Wheezing., Disp: 100 each, Rfl: 2  •  apixaban (Eliquis) 5 MG tablet tablet, Take 1 tablet by mouth 2 (Two) Times a Day., Disp: 180 tablet, Rfl: 1  •  atorvastatin (LIPITOR) 10 MG tablet, TAKE ONE TABLET BY MOUTH AT BEDTIME, Disp: 90 tablet, Rfl: 1  •  Blood Glucose Monitoring Suppl (ACCU-CHEK KENNETH SMARTVIEW) w/Device kit, TEST blood sugar three times daily or as directed,  "Disp: 1 kit, Rfl: 0  •  cyclobenzaprine (FLEXERIL) 10 MG tablet, TAKE ONE TABLET BY MOUTH THREE TIMES DAILY as needed for muscle spasms, Disp: 45 tablet, Rfl: 0  •  desvenlafaxine (PRISTIQ) 50 MG 24 hr tablet, TAKE ONE TABLET BY MOUTH EVERY DAY, Disp: 90 tablet, Rfl: 1  •  Diclofenac Sodium (Voltaren) 1 % gel gel, Apply 4 g topically to the appropriate area as directed 4 (Four) Times a Day As Needed (shoulder pain)., Disp: 400 g, Rfl: 2  •  dicyclomine (Bentyl) 10 MG capsule, Take 1 cap po q 8 hours prn spasm, Disp: 60 capsule, Rfl: 0  •  famotidine (PEPCID) 40 MG tablet, Take 1 tablet by mouth 2 (Two) Times a Day. With lunch and at bedtime, Disp: 180 tablet, Rfl: 3  •  furosemide (LASIX) 20 MG tablet, TAKE ONE TABLET BY MOUTH EVERY DAY Take ONE additional pill daily as needed for swelling, Disp: 180 tablet, Rfl: 1  •  gabapentin (NEURONTIN) 400 MG capsule, TAKE ONE CAPSULE BY MOUTH EVERY MORNING, AT NOON, AND TWO AT BEDTIME, Disp: 120 capsule, Rfl: 2  •  HYDROcodone-acetaminophen (NORCO) 5-325 MG per tablet, TAKE ONE TABLET BY MOUTH every 12 hours AS NEEDED FOR SEVERE PAIN, Disp: 30 tablet, Rfl: 0  •  insulin aspart (novoLOG) 100 UNIT/ML injection, Inject 5 Units under the skin into the appropriate area as directed 3 (Three) Times a Day With Meals., Disp: , Rfl: 12  •  levothyroxine (SYNTHROID, LEVOTHROID) 88 MCG tablet, TAKE ONE TABLET BY MOUTH EVERY DAY, Disp: 90 tablet, Rfl: 1  •  loratadine (Claritin) 10 MG tablet, Take 10 mg by mouth Daily., Disp: , Rfl:   •  midodrine (PROAMATINE) 2.5 MG tablet, TAKE ONE TABLET BY MOUTH THREE TIMES DAILY, Disp: 270 tablet, Rfl: 3  •  Mirabegron ER (Myrbetriq) 25 MG tablet sustained-release 24 hour 24 hr tablet, Take 1 tablet by mouth Daily., Disp: 30 tablet, Rfl: 5  •  Nebulizer device, 1 each Take As Directed., Disp: 1 each, Rfl: 0  •  Needle, Disp, (BD DISP NEEDLES) 30G X 1/2\" misc, To be used 3 times daily with Novolog Flexpen., Disp: 100 each, Rfl: 5  •  nitrofurantoin, " macrocrystal-monohydrate, (MACROBID) 100 MG capsule, Take 100 mg by mouth Daily., Disp: , Rfl:   •  nitrofurantoin, macrocrystal-monohydrate, (MACROBID) 100 MG capsule, Take 100 mg by mouth 2 (Two) Times a Day., Disp: , Rfl:   •  nystatin (MYCOSTATIN) 539550 UNIT/GM cream, Apply  topically to the appropriate area as directed 2 (two) times a day., Disp: 30 g, Rfl: 0  •  O2 (OXYGEN), Inhale 2 L/min Every Night. Uses 2L  overnight only, Disp: , Rfl:   •  omeprazole (priLOSEC) 40 MG capsule, Take 1 capsule by mouth 2 (two) times a day., Disp: 180 capsule, Rfl: 3  •  ondansetron (ZOFRAN) 8 MG tablet, Take 1 tablet by mouth 3 (Three) Times a Day As Needed for Nausea or Vomiting., Disp: 30 tablet, Rfl: 5  •  phenazopyridine (PYRIDIUM) 200 MG tablet, Take 200 mg by mouth 3 (Three) Times a Day., Disp: , Rfl:   •  potassium chloride 10 MEQ CR tablet, TAKE ONE TABLET BY MOUTH EVERY DAY, Disp: 90 tablet, Rfl: 2  •  sennosides-docusate (senna-docusate sodium) 8.6-50 MG per tablet, Take 2 tablets by mouth Daily., Disp: 60 tablet, Rfl: 3  •  Tresiba FlexTouch 200 UNIT/ML solution pen-injector pen injection, INJECT 54 UNITS subcutaneously AT BEDTIME, Disp: 9 mL, Rfl: 11  •  UltiCare Mini Pen Needles 31G X 6 MM misc, USE TO inject insulins FOUR TIMES DAILY AS DIRECTED, Disp: 400 each, Rfl: 3  •  Ventolin  (90 Base) MCG/ACT inhaler, INHALE TWO PUFFS BY MOUTH EVERY 4 HOURS AS NEEDED FOR WHEEZING, Disp: 18 g, Rfl: 3  No current facility-administered medications for this visit.    Facility-Administered Medications Ordered in Other Visits:   •  acetaminophen (TYLENOL) tablet 650 mg, 650 mg, Oral, Q6H PRN, Elieser Headley MD  •  diphenhydrAMINE (BENADRYL) capsule 25 mg, 25 mg, Oral, Once, Elieser Headley MD  •  lactated ringers infusion, 100 mL/hr, Intravenous, Continuous, Serafin Ferreira CRNA  •  ondansetron (ZOFRAN) injection 4 mg, 4 mg, Intravenous, Once PRN, Serafin Ferreira CRNA    The following portions of the  patient's history were reviewed and updated as appropriate: allergies, current medications, past family history, past medical history, past social history, past surgical history and problem list.    Review of Systems   Constitutional: Positive for fatigue. Negative for fever and unexpected weight change.   HENT: Positive for congestion.    Respiratory: Positive for cough and shortness of breath.    Cardiovascular: Negative for chest pain, palpitations and leg swelling.   Gastrointestinal: Negative.    Endocrine: Negative.    Genitourinary: Negative.    Musculoskeletal: Positive for arthralgias.   Allergic/Immunologic: Negative.    Neurological: Positive for dizziness.   Psychiatric/Behavioral: Positive for confusion.       Assessment     Physical Exam  Vitals reviewed.   Constitutional:       Appearance: She is obese. She is ill-appearing (chronically, but not acutely).   HENT:      Head: Normocephalic.      Right Ear: Tympanic membrane normal.      Left Ear: Tympanic membrane normal.      Nose: Nose normal. No congestion.   Cardiovascular:      Rate and Rhythm: Normal rate and regular rhythm.      Pulses: Normal pulses.      Heart sounds: Normal heart sounds.   Pulmonary:      Effort: Pulmonary effort is normal.      Breath sounds: Normal breath sounds.   Musculoskeletal:         General: No swelling.      Right lower leg: No edema.      Left lower leg: No edema.      Comments: Patient in a wheelchair   Skin:     General: Skin is warm and dry.   Neurological:      General: No focal deficit present.      Mental Status: She is alert and oriented to person, place, and time.   Psychiatric:         Mood and Affect: Mood normal.         Behavior: Behavior normal.         Thought Content: Thought content normal.         Plan          Diagnoses and all orders for this visit:    1. Chronic diastolic CHF (congestive heart failure) (CMS/[facility]) (Primary)  -     proBNP  -     Comprehensive metabolic panel  -     CBC w AUTO  Differential    2. Essential hypertension    3. 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)  -     proBNP  -     Comprehensive metabolic panel  -     CBC w AUTO Differential    4. Dizzy  -     POC Urinalysis Dipstick, Automated  -     Urine Culture - Urine, Urine, Clean Catch    5. Leukocytes in urine  -     POC Urinalysis Dipstick, Automated  -     Urine Culture - Urine, Urine, Clean Catch    6. Chronic anemia  -     proBNP  -     Comprehensive metabolic panel  -     CBC w AUTO Differential    7. Myelodysplastic syndrome with 5q deletion (CMS/HCC)  -     proBNP  -     Comprehensive metabolic panel  -     CBC w AUTO Differential        Her CBC was reviewedCB and Differential (07/14/2021 09:48) was reviewed from Dr. Headley. Chronic, stable anemia with Procrit injection received yesterday.    SPO2 was 87% upon arrival and recovered to 91%. Her caregiver, Basia did not bring her oxygen this week from Mayra's house or nebulizer. I have instructed her to do so to prevent an acute hypoxic event.     No UTI today, chronic preventive treatment with Macrobid.     Will check a BNP today due to CHF. She has a Follow up with Dr. Mercado in 2 weeks.     Addendum Discussed case with Dr. Mercado as well.

## 2021-07-16 PROBLEM — R82.998 LEUKOCYTES IN URINE: Status: ACTIVE | Noted: 2021-01-01

## 2021-07-16 NOTE — TELEPHONE ENCOUNTER
Caller: GARRICK    Relationship to patient: Home Health    Best call back number: [phone]    Patient is needing: HOME HEALTH IS NEEDING VERBAL ORDERS FOR PHYSICAL THERAPY. PLEASE CALL THEM BACK

## 2021-07-19 NOTE — TELEPHONE ENCOUNTER
I spoke with pts mom and confirmed the dosage and she states it was 182 before she went to bed an they gave her 50 units last night and this morning her blood sugar was in the 40s, she states that she isn't able to function. She is requesting to see if they can move her Treciba down to the 40 range to just try and see if that would help her?

## 2021-07-19 NOTE — TELEPHONE ENCOUNTER
FLORENCIA CALLED HER MOTHER'S BLOOD SUGAR HAS BEEN GOING DOWN TO THE 40'S. SHE NEEDS TO CONFIRM DOSAGE OF TRECIBA AND NOVALOG.    PLEASE ADVISE  [phone]

## 2021-07-19 NOTE — TELEPHONE ENCOUNTER
RX Refill request.    1. Subacromial bursitis of left shoulder joint [M75.52]   2. Primary localized osteoarthrosis of left shoulder region [M19.012]   You last saw her 06.30.2021.

## 2021-07-20 NOTE — TELEPHONE ENCOUNTER
Was unable to S/W patient, LVM with message from Dr. Mercado. Will try again later to S/W patient.

## 2021-07-23 NOTE — TELEPHONE ENCOUNTER
Called patient and daughter-in-law to discuss MRI results message left to call back to clinic.  MRI Cervical Spine Without Contrast    Result Date: 7/23/2021  MRI Spine Cervical WO INDICATION:  81-year-old female with left cervical radicular pain. Evaluate cervical spine. The patient reports a history of previous cervical spine surgery. TECHNIQUE: MRI of the cervical spine without IV contrast. COMPARISON:  CT scan of the cervical spine dated 10/16/2018. FINDINGS: The C1-T3 vertebrae are visualized. There are post surgical changes compatible of anterior discectomy and fusion at C5-6 and C6-7, which are also present on the prior CT scan. Remaining vertebral bodies are normal in height, with reactive changes in the vertebral body endplates at C4-5 and C7-T1. Marrow signal intensity is otherwise within normal limits. There is straightening of the usual cervical lordosis. There is effacement of the cervical cord at C4-5, and possible subtle increased signal in the cord at C4-5. Ill-defined increased signal intensity is also noted in the christi. The cerebellum and brainstem, as imaged are unremarkable. C2-3: No focal bulge or herniation. The spinal canal and neural foramina are patent. C3-4: Disc desiccation with a well maintained disc height. Approximately 4 mm central disc protrusion, effacing the ventral thecal sac and the ventral cervical cord and resulting in mild spinal stenosis and mild proximal foraminal stenosis bilaterally. C4-5: Disc desiccation and loss of disc height. There is a 5 to 6 mm central and right paracentral subligamentous disc herniation effacing the ventral thecal sac and the ventral cervical cord and results in relatively severe spinal stenosis. Severe right and moderate left foraminal stenosis. There is also mild to moderate right-sided facet arthropathy. C5-C6: Status post fusion. Mild right foraminal stenosis. The spinal canal and left neural foramen are patent. C6-7: Status post fusion. The  spinal canal is patent. Probable mild bilateral foraminal narrowing. C7-T1: Approximately 2 mm broad-based posterior and posterolateral disc bulging, with mild bilateral foraminal stenosis. The spinal canal appears patent. The surrounding soft tissues, as imaged are unremarkable.     1. Straightening of the usual cervical lordosis and multilevel degenerative disc disease, as well as postsurgical changes compatible with fusion at C5-6 and C6-7. 2. At C4-5 there is a central and right paracentral subligamentous disc herniation with relatively severe spinal stenosis and effacement of the cervical cord, with severe right and moderate left foraminal stenosis. Possible subtle increased signal in the cervical cord at this level suggesting edema or myelomalacia. 3. At C3-4 there is a central disc protrusion with mild spinal stenosis and mild bilateral foraminal stenosis. 4. At C7-T1 broad-based disc bulging, and mild bilateral foraminal stenosis. 5. Ill-defined increased signal intensity in the christi. This is a nonspecific appearance, but may reflect ischemic changes related to small vessel disease. Signer Name: Mildred Frazier MD  Signed: 7/23/2021 3:10 PM  Workstation Name: [facility]  [facility]    XR Chest PA & Lateral    Result Date: 6/30/2021  CHEST X-RAY, 6/30/2021  HISTORY:  81-year-old female with 3-4 day history of cough.  TECHNIQUE: Upright two view chest x-ray.  FINDINGS: Opacity in the medial left lung base behind the heart partially obscuring the diaphragm is new since the prior study. Left lung base infiltrate is suspected. Remaining portions of both lungs are clear. The heart is mildly enlarged, but pulmonary vascularity is normal. There is no pleural effusion. Postop changes previous low cervical spine fusion surgery.     1.  Suspected airspace infiltrate in the left medial lung base behind the heart. Remaining portions of both lungs are clear. 2.  Mild cardiomegaly. Signer Name:  Jacques Bates MD  Signed: 6/30/2021 3:18 PM  Workstation Name: [email or web address]  [facility]    XR Spine Cervical 2 View    Ordering physician's impression is located in the Encounter Note dated 06/30/21.

## 2021-07-26 NOTE — TELEPHONE ENCOUNTER
Mayra returned your call about Joya. She will be available at this # [phone].  She would like to talk to you.  
no

## 2021-07-28 NOTE — NURSING NOTE
HGB 7.8 today.  Two units PRBC'S ordered to be given tomorrow in [facility] at 9:30.  T&S drawn and blood bank armband placed on patient.

## 2021-08-04 NOTE — NURSING NOTE
Because hgb today was 11.7, the pt was informed that she does not need the neupogen and procrit injections. The pt will follow-up in one week for labwork and possible injections.

## 2021-08-04 NOTE — PROGRESS NOTES
Joya Singh is a 81 y.o. female, who presents with a chief complaint of   Chief Complaint   Patient presents with   • Follow-up   • Shortness of Breath   • Dizziness           HPI   Pt here for follow up.  Hx from pt and primary care giver.    She had pneumonia last month  Pt still c/o soa.  She is using her oxygen more.  + some dizziness. Nebs help some and she is using them bid.  She was treated for pneumonia in the beginning of June by dr. Headley.  She was on cefdinir 300mg bid x 10 days then was on moxifloxacin at the end of June.  She says she still hasnt gotten better.      MDS-Follows with oncology and on procrit/neupogen. Her last blood transfusion was 2 units last week.  hgb today 11.7.  She is requiring blood transfusions 2 units q 2-3 weeks.        DM2-  She says most glucoses 120-170.  highest recent glucose in 200s and lowest 77.  no recent a1c bc pt has had multiple recent blood transfusions.  She was taking 54 u Tresiba nightly and this is down to 36 units bc of hypoglycemia.  She has novolog to take PRN.   We discussed taking a lower dose of tresiba every day and using novolog as a supplement when glucose >150.   Their entire home has been trying to cut out more sugar.      She has diabetic retinopathy in her left eye and has had to have shots in them per opthalmology  She has proteinuria/ckd stage 4/nephropathy as well.     She saw dr. Fall and was put on mybertriq for OAB.  She has also been drinking a glass of cranberry juice a day.  The new med has really helped her urgency.  She is on macrobid daily for UTI PPX.     Pt saw dr. Rogers and had an egd done 5/13.  She had a balloon dilation.  She feels her swallowing is about the same.  She is on omeprazole bid and famotidine bid.       She has had worsening depression. Her DIL says she stays in her room a lot. Pt has had her vaccines and family is trying to get her out of the house.  Usually pt will interact with others but lately she has  been more to herself.  She says she is self contious about eating around others.  I offered behavioral health/counseling but pt declines.  She does like working with her flowers and she has some inside and in containers she can work with.  She is on pristiq.       She saw pain mgt last Friday.  Pain mgt would like to do an epidural block but requests that pt be off eliquis 3 days prior to the procedure.  She has had chronic shoulder pain that interferes with her daily activities.       The following portions of the patient's history were reviewed and updated as appropriate: allergies, current medications, past family history, past medical history, past social history, past surgical history and problem list.    Allergies: Baclofen, Codeine, Lisinopril, Morphine, and Penicillins    Review of Systems   Constitutional: Negative.  Negative for fever.   HENT: Negative.    Eyes: Negative.    Respiratory: Positive for cough and shortness of breath.    Cardiovascular: Negative.    Gastrointestinal: Negative.    Endocrine: Negative.    Genitourinary: Negative.    Musculoskeletal: Negative.    Skin: Negative.    Allergic/Immunologic: Negative.    Neurological: Positive for dizziness.   Hematological: Negative.    Psychiatric/Behavioral: Negative.    All other systems reviewed and are negative.            Wt Readings from Last 3 Encounters:   08/04/21 85.5 kg (188 lb 9.6 oz)   07/29/21 85.5 kg (188 lb 6.4 oz)   06/30/21 87.2 kg (192 lb 4.8 oz)     Temp Readings from Last 3 Encounters:   08/04/21 97.1 °F (36.2 °C) (Temporal)   07/29/21 97.2 °F (36.2 °C)   07/28/21 98 °F (36.7 °C) (Infrared)     BP Readings from Last 3 Encounters:   08/04/21 130/80   07/29/21 133/60   07/15/21 140/80     Pulse Readings from Last 3 Encounters:   08/04/21 72   07/29/21 84   07/15/21 88     Body mass index is 34.49 kg/m².  SpO2 Readings from Last 3 Encounters:   08/04/21 92%   07/29/21 93%   07/16/21 91%          Physical Exam  Vitals and nursing  note reviewed.   Constitutional:       General: She is not in acute distress.     Appearance: She is well-developed.   HENT:      Head: Normocephalic and atraumatic.      Right Ear: External ear normal.      Left Ear: External ear normal.      Nose: Nose normal.   Eyes:      Conjunctiva/sclera: Conjunctivae normal.      Pupils: Pupils are equal, round, and reactive to light.   Cardiovascular:      Rate and Rhythm: Normal rate and regular rhythm.      Heart sounds: Normal heart sounds.   Pulmonary:      Effort: Pulmonary effort is normal. No respiratory distress.      Breath sounds: Normal breath sounds. No wheezing.   Musculoskeletal:         General: Normal range of motion.      Cervical back: Normal range of motion and neck supple.      Comments: Normal gait   Skin:     General: Skin is warm and dry.   Neurological:      Mental Status: She is alert and oriented to person, place, and time.   Psychiatric:         Behavior: Behavior normal.         Thought Content: Thought content normal.         Judgment: Judgment normal.         Results for orders placed or performed in visit on 08/04/21   CBC Auto Differential    Specimen: Blood   Result Value Ref Range    WBC 6.43 3.40 - 10.80 10*3/mm3    RBC 3.88 3.77 - 5.28 10*6/mm3    Hemoglobin 11.7 (L) 12.0 - 15.9 g/dL    Hematocrit 36.8 34.0 - 46.6 %    MCV 94.8 79.0 - 97.0 fL    MCH 30.2 26.6 - 33.0 pg    MCHC 31.8 31.5 - 35.7 g/dL    RDW 22.0 (H) 12.3 - 15.4 %    RDW-SD 73.1 (H) 37.0 - 54.0 fl    MPV 12.0 6.0 - 12.0 fL    Platelets 269 140 - 450 10*3/mm3    Neutrophil % 58.4 42.7 - 76.0 %    Lymphocyte % 23.3 19.6 - 45.3 %    Monocyte % 8.1 5.0 - 12.0 %    Eosinophil % 7.2 (H) 0.3 - 6.2 %    Basophil % 1.9 (H) 0.0 - 1.5 %    Immature Grans % 1.1 (H) 0.0 - 0.5 %    Neutrophils, Absolute 3.76 1.70 - 7.00 10*3/mm3    Lymphocytes, Absolute 1.50 0.70 - 3.10 10*3/mm3    Monocytes, Absolute 0.52 0.10 - 0.90 10*3/mm3    Eosinophils, Absolute 0.46 (H) 0.00 - 0.40 10*3/mm3     Basophils, Absolute 0.12 0.00 - 0.20 10*3/mm3    Immature Grans, Absolute 0.07 (H) 0.00 - 0.05 10*3/mm3    nRBC 0.0 0.0 - 0.2 /100 WBC     *Note: Due to a large number of results and/or encounters for the requested time period, some results have not been displayed. A complete set of results can be found in Results Review.     Result Review :                  Assessment and Plan    Diagnoses and all orders for this visit:    1. Chronic diastolic CHF (congestive heart failure) (CMS/[facility]) (Primary)    2. Essential hypertension - pt with dizziness.  Unclear if sx from glucoses/hypoglycemia or orthostasis.  Will adjust insulin and if not improving pt may need to increase midodrine.  bp today in office normal.    3. CKD stage 3 due to type 2 diabetes mellitus (CMS/[facility])    4. Myelodysplastic syndrome with 5q deletion (CMS/[facility])    5. Aspiration pneumonia of left lung, unspecified aspiration pneumonia type, unspecified part of lung (CMS/[facility]) - s/p 2 recent courses of abx but pt not totally better.   -     XR Chest PA & Lateral; Future    6. Type 2 diabetes mellitus with diabetic neuropathy, with long-term current use of insulin (CMS/[facility]) - decrease long acting insulin to 30 units nightly.  Use novolog tid prn         She saw pain mgt last Friday.  Pain mgt would like to do an epidural block but requests that pt be off eliquis 3 days prior to the procedure.  She has had chronic shoulder pain that interferes with her daily activities.She can stop her eliquis for 3 days then start it back the day after she has her injection.  I have discussed the RBA of this with patient.  Her pain is interfering with her quality of life and I feel like the benefit of improving her pain would outweigh the risk of stopping her eliquis for 3 days.          I spent 40 minutes caring for Joya on this date of service. This time includes time spent by me in the following activities:preparing for the visit, reviewing tests, obtaining and/or reviewing a  separately obtained history, performing a medically appropriate examination and/or evaluation , counseling and educating the patient/family/caregiver, ordering medications, tests, or procedures, referring and communicating with other health care professionals , documenting information in the medical record and care coordination      Outpatient Medications Prior to Visit   Medication Sig Dispense Refill   • ACCU-CHEK FASTCLIX LANCETS misc TEST 3-4 TIMES DAILY AS DIRECTED 400 each 3   • ACCU-CHEK SMARTVIEW test strip TEST BLOOD SUGAR THREE TIMES DAILY OR AS DIRECTED 300 each 3   • acetaminophen (TYLENOL) 325 MG tablet Take 2 tablets by mouth Every 6 (Six) Hours As Needed for Mild Pain . OTC product 40 tablet 0   • albuterol (PROVENTIL) (2.5 MG/3ML) 0.083% nebulizer solution Take 2.5 mg by nebulization Every 4 (Four) Hours As Needed for Wheezing. 100 each 2   • apixaban (Eliquis) 5 MG tablet tablet Take 1 tablet by mouth 2 (Two) Times a Day. 180 tablet 1   • atorvastatin (LIPITOR) 10 MG tablet TAKE ONE TABLET BY MOUTH AT BEDTIME 90 tablet 1   • Blood Glucose Monitoring Suppl (ACCU-CHEK KENNETH SMARTVIEW) w/Device kit TEST blood sugar three times daily or as directed 1 kit 0   • cyclobenzaprine (FLEXERIL) 10 MG tablet TAKE ONE TABLET BY MOUTH THREE TIMES DAILY as needed for muscle spasms 45 tablet 0   • desvenlafaxine (PRISTIQ) 50 MG 24 hr tablet TAKE ONE TABLET BY MOUTH EVERY DAY 90 tablet 1   • Diclofenac Sodium (Voltaren) 1 % gel gel Apply 4 g topically to the appropriate area as directed 4 (Four) Times a Day As Needed (shoulder pain). 400 g 2   • dicyclomine (Bentyl) 10 MG capsule Take 1 cap po q 8 hours prn spasm 60 capsule 0   • famotidine (PEPCID) 40 MG tablet Take 1 tablet by mouth 2 (Two) Times a Day. With lunch and at bedtime 180 tablet 3   • furosemide (LASIX) 20 MG tablet TAKE ONE TABLET BY MOUTH EVERY DAY Take ONE additional pill daily as needed for swelling 180 tablet 1   • gabapentin (NEURONTIN) 400 MG  "capsule TAKE ONE CAPSULE BY MOUTH EVERY MORNING, AT NOON, AND TWO AT BEDTIME 120 capsule 2   • HYDROcodone-acetaminophen (NORCO) 5-325 MG per tablet TAKE ONE TABLET BY MOUTH every 12 hours AS NEEDED FOR SEVERE PAIN 30 tablet 0   • insulin aspart (novoLOG) 100 UNIT/ML injection Inject 5 Units under the skin into the appropriate area as directed 3 (Three) Times a Day With Meals.  12   • levothyroxine (SYNTHROID, LEVOTHROID) 88 MCG tablet TAKE ONE TABLET BY MOUTH EVERY DAY 90 tablet 1   • loratadine (Claritin) 10 MG tablet Take 10 mg by mouth Daily.     • midodrine (PROAMATINE) 2.5 MG tablet TAKE ONE TABLET BY MOUTH THREE TIMES DAILY 270 tablet 3   • Mirabegron ER (Myrbetriq) 25 MG tablet sustained-release 24 hour 24 hr tablet Take 1 tablet by mouth Daily. 30 tablet 5   • Nebulizer device 1 each Take As Directed. 1 each 0   • Needle, Disp, (BD DISP NEEDLES) 30G X 1/2\" misc To be used 3 times daily with Novolog Flexpen. 100 each 5   • nitrofurantoin, macrocrystal-monohydrate, (MACROBID) 100 MG capsule Take 100 mg by mouth Daily.     • nitrofurantoin, macrocrystal-monohydrate, (MACROBID) 100 MG capsule Take 100 mg by mouth 2 (Two) Times a Day.     • nystatin (MYCOSTATIN) 898406 UNIT/GM cream Apply  topically to the appropriate area as directed 2 (two) times a day. 30 g 0   • O2 (OXYGEN) Inhale 2 L/min Every Night. Uses 2L  overnight only     • omeprazole (priLOSEC) 40 MG capsule Take 1 capsule by mouth 2 (two) times a day. 180 capsule 3   • ondansetron (ZOFRAN) 8 MG tablet Take 1 tablet by mouth 3 (Three) Times a Day As Needed for Nausea or Vomiting. 30 tablet 5   • phenazopyridine (PYRIDIUM) 200 MG tablet Take 200 mg by mouth 3 (Three) Times a Day.     • potassium chloride 10 MEQ CR tablet TAKE ONE TABLET BY MOUTH EVERY DAY 90 tablet 2   • sennosides-docusate (senna-docusate sodium) 8.6-50 MG per tablet Take 2 tablets by mouth Daily. 60 tablet 3   • Tresiba FlexTouch 200 UNIT/ML solution pen-injector pen injection " INJECT 54 UNITS subcutaneously AT BEDTIME 9 mL 11   • UltiCare Mini Pen Needles 31G X 6 MM misc USE TO inject insulins FOUR TIMES DAILY AS DIRECTED 400 each 3   • Ventolin  (90 Base) MCG/ACT inhaler INHALE TWO PUFFS BY MOUTH EVERY 4 HOURS AS NEEDED FOR WHEEZING 18 g 3     Facility-Administered Medications Prior to Visit   Medication Dose Route Frequency Provider Last Rate Last Admin   • acetaminophen (TYLENOL) tablet 650 mg  650 mg Oral Q6H PRN Elieser Headley MD       • diphenhydrAMINE (BENADRYL) capsule 25 mg  25 mg Oral Once Elieser Headley MD       • lactated ringers infusion  100 mL/hr Intravenous Continuous Serafin Ferreira CRNA       • ondansetron (ZOFRAN) injection 4 mg  4 mg Intravenous Once PRN Serafin Ferreira CRNA         No orders of the defined types were placed in this encounter.    [unfilled]  There are no discontinued medications.      Return in about 1 month (around 9/4/2021) for Recheck.    Patient was given instructions and counseling regarding her condition or for health maintenance advice. Please see specific information pulled into the AVS if appropriate.

## 2021-08-06 PROBLEM — I48.92 ATRIAL FLUTTER (HCC): Status: ACTIVE | Noted: 2021-01-01

## 2021-08-06 PROBLEM — E44.0 MODERATE MALNUTRITION (HCC): Status: ACTIVE | Noted: 2021-01-01

## 2021-08-06 NOTE — ED PROVIDER NOTES
"Subjective   81-year-old female presents via EMS with complaint of \"I feel crummy,\"shortness of breath, cough, nausea, chest pain.  Patient states that she is generally felt this way for many weeks but this morning also became nauseous and just feels a little worse.  Patient was evaluated by primary care 2 days ago and placed on cefdinir and azithromycin for pneumonia.  Patient has been treated multiple times for pneumonia this summer and it is believed that she has had aspiration pneumonia.  Patient states she feels like she needs to cough but really does not cough that much, is not able to produce any sputum.  She does state that she has central chest pain which is worse with deep breaths.  This chest pain has been present since patient first diagnosed with pneumonia in June.  She denies that it is any different or worse this morning.  Patient does endorse some lightheadedness but denies any syncope.  Patient denies palpitations.  Patient noted to be in atrial flutter with heart rate in 130s.  She was unaware of this and apparently does not have history of this.  Patient also with history of MDS and receives frequent transfusions, last transfusion was last week.  Patient is on Eliquis and has been taking this as prescribed.  It appears that her indication is DVT.  Patient has history of diastolic CHF listed in her chart but last echo I can see looks relatively normal with only grade 1 diastolic dysfunction.          Review of Systems   Constitutional: Positive for fatigue. Negative for chills and fever.   HENT: Negative.    Eyes: Negative.    Respiratory: Positive for cough and shortness of breath. Negative for wheezing.    Cardiovascular: Positive for chest pain. Negative for palpitations and leg swelling.   Gastrointestinal: Positive for nausea. Negative for abdominal pain, diarrhea and vomiting.   Endocrine:        Diabetic   Genitourinary: Negative.    Musculoskeletal:        Chronic left shoulder pain   Skin: " Negative.    Allergic/Immunologic: Negative.    Neurological: Negative.    Hematological:        On Eliquis for DVT, frequent blood transfusions for MDS   Psychiatric/Behavioral: Negative.        Past Medical History:   Diagnosis Date   • Allergic rhinitis    • Anemia    • Anxiety    • Appetite absent    • Arthritis    • Asthma    • Back pain    • Bell's palsy    • Black tarry stools    • Blood in stool    • Chronic fatigue    • CKD (chronic kidney disease) stage 3, GFR 30-59 ml/min (CMS/[facility])    • Community acquired pneumonia of left lung 10/25/2018   • Cough    • Depression    • Diabetes mellitus (CMS/[facility])     LAST A1C 6   • Diabetic gastroparesis (CMS/[facility]) 2/19/2016   • Difficulty walking    • Excessive urination at night    • Frequent urination    • GERD (gastroesophageal reflux disease)    • GI bleed    • Gout    • H/O blood clots     LEFT LEG 7 OR 8 YEARS AGO   • Heat intolerance    • History of fall 10/2018   • History of prior pregnancies     x8, miscarriage 5   • History of transfusion 11/2018    due to anemia   • Hyperlipidemia    • Hypertension    • Hypothyroidism    • Normal coronary arteries     by cath 2013   • Orthostatic hypotension    • AARON (obstructive sleep apnea)     DOESNT WEAR REGULARLY   • Pneumonia    • PONV (postoperative nausea and vomiting)    • Skin cancer    • Stroke (CMS/[facility])     Several mini-strokes   • TIA (transient ischemic attack)     LAST TIA JULY 2017   • Urination pain    • UTI (urinary tract infection)     Dec 2018 and Jan 2019       Allergies   Allergen Reactions   • Baclofen Anxiety     Panic attack, nightmares   • Codeine Itching and Rash   • Lisinopril Cough   • Morphine Hives   • Penicillins Rash     Tolerates cephalosporins        Past Surgical History:   Procedure Laterality Date   • BACK SURGERY      HARDWARE   • CHOLECYSTECTOMY      OPEN   • COLONOSCOPY  2011    due for repeat in 2021   • COLONOSCOPY N/A 10/28/2018    Procedure: COLONOSCOPY;  Surgeon: Ken  Emmanuel Gan MD;  Location: [facility] OR;  Service: Gastroenterology   • ENDOSCOPY N/A 10/26/2018    Procedure: ESOPHAGOGASTRODUODENOSCOPY;  Surgeon: Emmanuel Rogers MD;  Location:  [facility] OR;  Service: Gastroenterology   • ENDOSCOPY N/A 5/13/2021    Procedure: ESOPHAGOGASTRODUODENOSCOPY, biopsy, dilatation;  Surgeon: Emmanuel Rogers MD;  Location:  [facility] OR;  Service: Gastroenterology;  Laterality: N/A;  Esophagitis; Dilation- no stricture; Biopsy- esophagus   • HYSTERECTOMY      PARTIAL    • KNEE SURGERY     • NECK SURGERY     • SPINE SURGERY     • TUMOR REMOVAL Left     Leg   • UPPER GASTROINTESTINAL ENDOSCOPY  2014    gastritis.  done by dr. hastings       Family History   Problem Relation Age of Onset   • Lupus Mother    • Heart failure Mother 59   • Heart disease Other    • Hypertension Other    • Heart attack Father    • Breast cancer Neg Hx        Social History     Socioeconomic History   • Marital status:      Spouse name: Not on file   • Number of children: 3   • Years of education: High School   • Highest education level: Not on file   Tobacco Use   • Smoking status: Never Smoker   • Smokeless tobacco: Never Used   • Tobacco comment: CAFFEINE USE: NONE   Vaping Use   • Vaping Use: Never used   Substance and Sexual Activity   • Alcohol use: No   • Drug use: No   • Sexual activity: Defer     Comment: EXERCISE - RARELY           Objective   Physical Exam  Constitutional:       General: She is not in acute distress.     Appearance: She is obese. She is ill-appearing. She is not toxic-appearing or diaphoretic.   HENT:      Head: Normocephalic and atraumatic.      Mouth/Throat:      Mouth: Mucous membranes are moist.      Pharynx: Oropharynx is clear.   Eyes:      Extraocular Movements: Extraocular movements intact.      Pupils: Pupils are equal, round, and reactive to light.   Cardiovascular:      Pulses: Normal pulses.      Heart sounds: Normal heart sounds.      Comments: Heart  rate 130s, regular  Pulmonary:      Effort: Pulmonary effort is normal. No respiratory distress.      Comments: Crackles at bilateral bases, worse on right  Chest:      Chest wall: No deformity, tenderness or crepitus.   Abdominal:      Palpations: Abdomen is soft.      Tenderness: There is no abdominal tenderness.   Musculoskeletal:         General: Normal range of motion.      Cervical back: Normal range of motion and neck supple.      Right lower leg: No tenderness. No edema.      Left lower leg: No tenderness. No edema.   Skin:     General: Skin is warm and dry.      Capillary Refill: Capillary refill takes less than 2 seconds.   Neurological:      General: No focal deficit present.      Mental Status: She is alert and oriented to person, place, and time.   Psychiatric:         Mood and Affect: Mood normal.         Behavior: Behavior normal.         Procedures           ED Course  ED Course as of Aug 06 0717   Fri Aug 06, 2021   0656 EKG obtained at 0600 shows atrial flutter with 2-1 ventricular rate.  Right bundle branch block.  Rate is 133.  No ST segment changes appreciated.    [TD]   0657 Patient with multiple issues apparently going on today.  Has some blood pressures documented with systolics in 90s but when cuff moved from her wrist and placed in proper position her systolics are 110s to 120s.  She does not appear to be in any distress, and was easily conversing with me, I do not feel that she has indication for emergency department cardioversion although she is anticoagulated so this is a possibility.  Has had some nausea but denies any fevers, has only had cough, some not convinced that she has pneumonia or sepsis at this point, but will evaluate for these.  Will give small diltiazem bolus and start drip to see if we can control rate a little better.    [TD]   4416 Discussed case with Dr. Alvarado who is on-call for hospitalist service.  He had some familiarity with patient has primary care had  discussed her with him earlier this week.  Agrees to accept patient to ICU level of care.  Dill bolus just given and drip started with no immediate response, will continue to monitor as long she is here.  Will obtain blood cultures and start Levaquin here.    [TD]      ED Course User Index  [TD] Christos Lizama MD                                           [facility]    Final diagnoses:   Atrial flutter, unspecified type (CMS/HCC)   Pneumonia of left lower lobe due to infectious organism       ED Disposition  ED Disposition     ED Disposition Condition Comment    Decision to Admit  Level of Care: Critical Care [6]   Diagnosis: Atrial flutter, unspecified type (CMS/HCC) [[id number]]   Admitting Physician: ANKUR SWANN [[id number]]   Attending Physician: ANKUR SWANN [[id number]]   Certification: I Certify That Inpatient Hospital Services Are Medically Necessary For Greater Than 2 Midnights            No follow-up provider specified.       Medication List      No changes were made to your prescriptions during this visit.          Christos Lizama MD  08/06/21 0767

## 2021-08-06 NOTE — TELEPHONE ENCOUNTER
Caller: PATTY    Relationship: [employer]    Best call back number: [phone]    What was the call regarding: HE CALLED TO CONFIRM SOME OF PT'S PAST OFFICE AND LAB VISIT DATES

## 2021-08-06 NOTE — NURSING NOTE
Admit to ICU - A Flutter with rapd heart rate 130's.    Pt with noted rhythm changes. Now having sporatic P waves,cardizem gtt has been off due to Hr 70's after Digoxin and amio started.    holding steady with regular P waves and rate 70's. Bp hypotensive but improving<MAP now 64.. called for repeat ECG.   Monitoring QT interval and BP closely. Will call cardiology to notify of changes.    Currently pt has had Digoxin 250 mg Iv x1  Cardizem gtt is off   amio loading dose is065fx  amio GTT infusing at 33.33  Lasix 40 mg IV      BP 91/47 (64)  HR 77  O2 97% on 2 Liters  RR 15-18    Waiting ECG repeated     Joann to obtain orthostatic Bp as pt states she cannot stand and is wheel chair bound at home

## 2021-08-06 NOTE — CONSULTS
"Adult Nutrition  Assessment/PES    Patient Name:  Joya Singh  YOB: 1940  MRN: [id number]  Admit Date:  8/6/2021    Assessment Date:  8/6/2021    Comments:  Pt meets criteria for moderate malnutrition with muscle loss and very poor po intake.     Recommend: consider d/c renal restriction to aid with po ( Mg, K wnl, no phos available)    May consider Boost Glucose Control to supplement po until better established  Will cont to follow and monitor.     Reason for Assessment     Row Name 08/06/21 1448          Reason for Assessment    Reason For Assessment  nurse/nurse practitioner consult     Diagnosis  cardiac disease;pulmonary disease;hematological/related complications aflutter, Aspiration PNA, MDS, A/CKD, hx Dm gastroporesis CHF, esoph stricture         Nutrition/Diet History     Row Name 08/06/21 1449          Nutrition/Diet History    Typical Food/Fluid Intake  Spoke w RN earlier before lunch declined visit b/c pt need stablize HR better. 2nd visit pt awake and open to questions/exam. NKFA. Denies issue chewing but confirms issue swallowing. feels like her appetite has been poor past year malick recently. Not using any nutrition shakes at home. Someone else shops/cooks for her.         Anthropometrics     Row Name 08/06/21 1450 08/06/21 1022       Anthropometrics    Height  --  154.9 cm (61\")    Weight  -- 204#  92.5 kg (204 lb)       Ideal Body Weight (IBW)    Ideal Body Weight (IBW) (kg)  --  48.15    % Ideal Body Weight  --  192.19       Body Mass Index (BMI)    BMI (kg/m2)  --  38.63    BMI Assessment  BMI 35-39.9: obesity grade II  --    Row Name 08/06/21 0834          Anthropometrics    Weight  92.6 kg (204 lb 3.2 oz)         Labs/Tests/Procedures/Meds     Row Name 08/06/21 1450          Labs/Procedures/Meds    Lab Results Reviewed  reviewed     Lab Results Comments  BUn 48/Creat 2.6 H, HgA1c 6.1 , Mg/K wnl        Diagnostic Tests/Procedures    Diagnostic Test/Procedure Reviewed  reviewed     " "Diagnostic Test/Procedures Comments  MBSS ordered        Medications    Pertinent Medications Reviewed  reviewed     Pertinent Medications Comments  lasix, novolog, levemir         Physical Findings     Row Name 08/06/21 1451          Physical Findings    Overall Physical Appearance  loss of muscle mass;obese         Estimated/Assessed Needs     Row Name 08/06/21 1451 08/06/21 1022       Calculation Measurements    Height  --  154.9 cm (61\")       Estimated/Assessed Needs    Additional Documentation  Calorie Requirements (Group);Fluid Requirements (Group);Protein Requirements (Group)  --       Calorie Requirements    Estimated Calorie Need Method  [facility]  --    Estimated Calorie Requirement Comment  1608 kcal ( mifflin 1.2) 181 gm CHO, 45% kcal  --       Protein Requirements    Est Protein Requirement Amount (gms/kg)  0.8 gm protein 74 gm pro  --       Fluid Requirements    Estimated Fluid Requirement Method  RDA Method 1608 ml  --        Nutrition Prescription Ordered     Row Name 08/06/21 1452          Nutrition Prescription PO    Common Modifiers  Cardiac;Consistent Carbohydrate;Renal         Evaluation of Received Nutrient/Fluid Intake     Row Name 08/06/21 1452          Fluid Intake Evaluation    Oral Fluid (mL)  120 insufficient data        PO Evaluation    Number of Days PO Intake Evaluated  Insufficient Data     Number of Meals  1     % PO Intake  25           Malnutrition Severity Assessment     Row Name 08/06/21 1452          Malnutrition Severity Assessment    Malnutrition Type  Acute Disease or Injury - Related Malnutrition        Insufficient Energy Intake     Insufficient Energy Intake   <75% of est. energy requirement for > or equal to 1 month        Unintentional Weight Loss     Unintentional Weight Loss   -- no 6month or 12 month wt available, also lasix home med        Muscle Loss    [place]  Moderate - slight depression     Clavicle Bone Region  Moderate - some protrusion in " females, visible in males     Posterior Calf Region  Moderate - some roundness, slight firmness        Criteria Met (Must meet criteria for severity in at least 2 of these categories: M Wasting, Fat Loss, Fluid, Secondary Signs, Wt. Status, Intake)    Patient meets criteria for   Moderate (non-severe) Malnutrition           Problem/Interventions:  Problem 1     Row Name 08/06/21 1453          Nutrition Diagnoses Problem 1    Problem 1  Malnutrition     Etiology (related to)  Factors Affecting Nutrition     Signs/Symptoms (evidenced by)  Report/Observation MSA               Intervention Goal     Row Name 08/06/21 1454          Intervention Goal    General  Meet nutritional needs for age/condition     PO  Establish PO;PO intake (%)     PO Intake %  50 % or greater     Weight  Appropriate weight loss lasix noted         Nutrition Intervention     Row Name 08/06/21 1454          Nutrition Intervention    RD/Tech Action  Interview for preference;Encourage intake;Follow Tx progress         Nutrition Prescription     Row Name 08/06/21 1454          Nutrition Prescription PO    PO Prescription  Begin/change diet        Other Orders    Other  d/c renal restriction to encoruage po, Mg/K wnl ( no phos available)         Education/Evaluation     Row Name 08/06/21 1450          Education    Education  Education not appropriate at this time        Monitor/Evaluation    Monitor  Per protocol;I&O;PO intake;Supplement intake;Pertinent labs;Weight;Symptoms           Electronically signed by:  Cristiane Bush RD  08/06/21 14:55 EDT

## 2021-08-06 NOTE — ED NOTES
Pt presents to ED via [facility] C/O shortness of breath and nausea. She was diagnosed with pneumonia at her PCP's office on Wednesday and states the shortness of breath got worse this morning and she became nauseated. EMS reports hypotension upon arrival with a SBP in the 70's, she is normotensive at this time. She was put on Azithromycin and Cefdinir and states she is taking them as prescribed. She wears 2L NC at home all the time. She is hard of hearing but is A&O.   She is additionally c/o left shoulder pain but states she has been worked up for this and it is not new.       Anisha Watson RN  08/06/21 [id number]

## 2021-08-06 NOTE — PLAN OF CARE
Goal Outcome Evaluation:  Plan of Care Reviewed With: patient        Progress: no change  Outcome Summary: SLP orders for bedside and video swallow study were received and appreciated by this department. Note MUSA Olivera's note states “Acute/recurrent aspiration pneumonia: pulmonary consulted, consult SLP to evaluate swallow, aspiration precautions, head of bed elevated, MBSS ordered due to history of aspiration and report of choking”     Video swallow study was completed March 29, 2021; multiple bedside swallows in 2020 and 2018 recommended regular and thins. Pt presenting complaints in March were “things sticking,” and demonstrated transient penetration with thins and nectar, no aspiration, and a barium tablet slow to clear with suspected esophageal concerns. Secondary to all material ejected from the airway and no aspiration under fluoroscopy it was recommended pt continue her diet of regular and thin liquids along with reflux precautions.     Pt is followed by GI with extensive GI history - gastroparesis, gastroesophageal reflux disease with esophagitis, reflux esophagitis, pill dysphagia (difficulty with pills x7 yrs), esophageal dysphagia. EGD performed in May 2021 with dilation, pt reported little aid.     Attempted to schedule VFSS this date, however pt is not stable enough to transport to radiology suite at this time per RN report - multiple drips, atrial flutter.     Attempted bedside, however, pt sleeping soundly and hays not wake, per nursing has been refusing PO secondary to overall acute illness.     Will attempt video swallow Monday 8/9/21 if pt is stable for participation to rule out aspiration, determine oropharyngeal function secondary to recurrent aspiration pneumonia

## 2021-08-06 NOTE — CONSULTS
Patient Name: Joya Singh  :1940  81 y.o.    Date of Admission: 2021  Date of Consultation:  21  Encounter Provider: Neetu Chapin MD  Place of Service: [facility] CARDIOLOGY  Referring Provider: No ref. provider found  Patient Care Team:  Chari Mercado MD as PCP - General  Chari Mercado MD as PCP - Family Medicine  Christos Rob MD as Surgeon (General Surgery)  German Wylie MD as Surgeon (Orthopedic Surgery)  Yasmani Baugh MD as Consulting Physician (Nephrology)  Yasmani Baugh MD as Referring Physician (Nephrology)  Elieser Headley MD as Consulting Physician (Hematology and Oncology)      Chief complaint: Shortness of breath, cough, nausea, chest pain    Reason for consult: Atrial Flutter    History of Present Illness:      This Is a 81-year-old female patient Dr. Sanon with a history of diabetes mellitus type 2 with dysautonomia, stroke, chronic anemia due to myelodysplastic syndrome and hypertension.  Follows with Dr. Headley for MDS. She aspirates per family.     She had a stress nuclear perfusion study in  which showed a small mildly reversible distal anterior wall defect but had no angina and so was treated medically.  In 2018, she was in CC fatigue and lightheadedness.  The felt that her symptoms are mostly due to chronic anemia from myelodysplastic syndrome and diabetic dysautonomia.  She was placed on Midrin because of her orthostasis.  She is required blood transfusions over the week.    She recently was diagnosed with pneumonia and had seen Dr. Mercado in the office on 2021.  At that time Dr. Mercado thought her pneumonia might be worsening actually but certainly did not seem to be getting better in the way that they had projected.  Her pulse rate was in the 70s.  She says she has been dizzy and weak which is not new but that last night she was even more weak than usual and family reported  that she became confused and short winded and they brought to the emergency department.  When she arrived, she was in atrial flutter with RVR.  She was started on diltiazem for blood pressure is low.    Her heart rates in the 130s.  This x-ray shows masslike opacity of the medial left lung base with advised to consider CT chest and increasing patchy infiltrates of both lungs.  Her troponin is negative, proBNP elevated 3842, creatinine 2.68, glucose 96, sodium 136, potassium 4.9, TSH elevated at 7.19 with normal free T4, magnesium 2.3, procalcitonin 0.71, white count normal, platelets normal, hemoglobin 11.3.    Her last echocardiogram was in 2018 showing ejection fraction 67% with grade 1 diastolic dysfunction.    She has been short winded.  She does not much of a cough.  She denies fevers.  She is been very weak and lightheaded.  She feels dizzy though all the time.  She is not confused as she was last night.  She denies chest tightness or pressure.  She does not really feel her heart racing.  She lives with her son and daughter-in-law and sons at bedside providing history.  She sleeps in a recliner at home and does use nasal cannula oxygen chronically at home.  In addition, they say that she has aspirated and so she eats sitting very straight up and slowly.  Previous cardiac testing:     Echocardiogram 10/04/2018:  · Left ventricular systolic function is normal.  · Calculated right ventricular systolic pressure from tricuspid regurgitation is 16 mmHg.  · Left ventricular diastolic dysfunction (grade I) consistent with impaired relaxation.  · Calculated EF = 67%.     Stress Test 10/05/2017:  · Left ventricular ejection fraction is normal (Calculated EF = 70%).  · Myocardial perfusion imaging indicates a small-sized, mildly severe area of ischemia located in the anterior wall.  · Impressions are consistent with a low risk study.    Holter Monitor 11/08/2016:  Average HR:  74. Min HR:  58. Max HR:  120.  The  predominant rhythm noted during the testing period was sinus rhythm. Premature atrial contractions occured frequently. There was no evidence of atrial arrhythmias. There were no episodes of supraventricular tachycardia. Premature ventricular contractions occured rarely. There were no episodes of ventricular tachycardia. Sinoatrial node conduction was normal. No atrioventricular block noted. occasional ectopic atrial rhythm with HR < 100 bpm.      Past Medical History:   Diagnosis Date   • Allergic rhinitis    • Anemia    • Anxiety    • Appetite absent    • Arthritis    • Asthma    • Back pain    • Bell's palsy    • Black tarry stools    • Blood in stool    • Chronic fatigue    • CKD (chronic kidney disease) stage 3, GFR 30-59 ml/min (CMS/[facility])    • Community acquired pneumonia of left lung 10/25/2018   • Cough    • Depression    • Diabetes mellitus (CMS/[facility])     LAST A1C 6   • Diabetic gastroparesis (CMS/[facility]) 2/19/2016   • Difficulty walking    • Excessive urination at night    • Frequent urination    • GERD (gastroesophageal reflux disease)    • GI bleed    • Gout    • H/O blood clots     LEFT LEG 7 OR 8 YEARS AGO   • Heat intolerance    • History of fall 10/2018   • History of prior pregnancies     x8, miscarriage 5   • History of transfusion 11/2018    due to anemia   • Hyperlipidemia    • Hypertension    • Hypothyroidism    • Normal coronary arteries     by cath 2013   • Orthostatic hypotension    • AARON (obstructive sleep apnea)     DOESNT WEAR REGULARLY   • Pneumonia    • PONV (postoperative nausea and vomiting)    • Skin cancer    • Stroke (CMS/[facility])     Several mini-strokes   • TIA (transient ischemic attack)     LAST TIA JULY 2017   • Urination pain    • UTI (urinary tract infection)     Dec 2018 and Jan 2019       Past Surgical History:   Procedure Laterality Date   • BACK SURGERY      HARDWARE   • CHOLECYSTECTOMY      OPEN   • COLONOSCOPY  2011    due for repeat in 2021   • COLONOSCOPY N/A 10/28/2018     Procedure: COLONOSCOPY;  Surgeon: Emmanuel Rogers MD;  Location: [facility] OR;  Service: Gastroenterology   • ENDOSCOPY N/A 10/26/2018    Procedure: ESOPHAGOGASTRODUODENOSCOPY;  Surgeon: Emmanuel Rogers MD;  Location:  [facility] OR;  Service: Gastroenterology   • ENDOSCOPY N/A 5/13/2021    Procedure: ESOPHAGOGASTRODUODENOSCOPY, biopsy, dilatation;  Surgeon: Emmanuel Rogers MD;  Location: [facility] OR;  Service: Gastroenterology;  Laterality: N/A;  Esophagitis; Dilation- no stricture; Biopsy- esophagus   • HYSTERECTOMY      PARTIAL    • KNEE SURGERY     • NECK SURGERY     • SPINE SURGERY     • TUMOR REMOVAL Left     Leg   • UPPER GASTROINTESTINAL ENDOSCOPY  2014    gastritis.  done by dr. hastings         Prior to Admission medications    Medication Sig Start Date End Date Taking? Authorizing Provider   ACCU-CHEK FASTCLIX LANCETS misc TEST 3-4 TIMES DAILY AS DIRECTED 10/1/18  Yes Chari Mercado MD   ACCU-CHEK SMARTVIEW test strip TEST BLOOD SUGAR THREE TIMES DAILY OR AS DIRECTED 5/22/19  Yes Chari Mercado MD   acetaminophen (TYLENOL) 325 MG tablet Take 2 tablets by mouth Every 6 (Six) Hours As Needed for Mild Pain . OTC product 11/6/17  Yes Alison Olivera APRN   albuterol (PROVENTIL) (2.5 MG/3ML) 0.083% nebulizer solution Take 2.5 mg by nebulization Every 4 (Four) Hours As Needed for Wheezing. 3/9/21  Yes Chari Mercado MD   apixaban (Eliquis) 5 MG tablet tablet Take 1 tablet by mouth 2 (Two) Times a Day. 6/23/21  Yes Elieser Headley MD   atorvastatin (LIPITOR) 10 MG tablet TAKE ONE TABLET BY MOUTH AT BEDTIME 4/9/21  Yes Chari Mercado MD   azithromycin (Zithromax) 250 MG tablet Take 2 tablets the first day, then 1 tablet daily for 4 days. 8/5/21  Yes Chari Mercado MD   Blood Glucose Monitoring Suppl (ACCU-CHEK KENNETH SMARTVIEW) w/Device kit TEST blood sugar three times daily or as directed 1/18/18  Yes Chari Mercado MD    cefdinir (OMNICEF) 300 MG capsule Take 1 capsule by mouth 2 (Two) Times a Day for 10 days. 8/5/21 8/15/21 Yes Chari Mercado MD   cyclobenzaprine (FLEXERIL) 10 MG tablet TAKE ONE TABLET BY MOUTH THREE TIMES DAILY as needed for muscle spasms 7/19/21  Yes Keren Vences APRN   desvenlafaxine (PRISTIQ) 50 MG 24 hr tablet TAKE ONE TABLET BY MOUTH EVERY DAY 4/9/21  Yes Chari Mercado MD   Diclofenac Sodium (Voltaren) 1 % gel gel Apply 4 g topically to the appropriate area as directed 4 (Four) Times a Day As Needed (shoulder pain). 12/29/20  Yes Chari Mercado MD   dicyclomine (Bentyl) 10 MG capsule Take 1 cap po q 8 hours prn spasm 12/2/20  Yes Chari Mercado MD   famotidine (PEPCID) 40 MG tablet Take 1 tablet by mouth 2 (Two) Times a Day. With lunch and at bedtime 5/7/21  Yes Liyah Abel APRN   furosemide (LASIX) 20 MG tablet TAKE ONE TABLET BY MOUTH EVERY DAY Take ONE additional pill daily as needed for swelling 6/21/21  Yes Chari Mercado MD   gabapentin (NEURONTIN) 400 MG capsule TAKE ONE CAPSULE BY MOUTH EVERY MORNING, AT NOON, AND TWO AT BEDTIME 4/9/21  Yes Chari Mercado MD   HYDROcodone-acetaminophen (NORCO) 5-325 MG per tablet TAKE ONE TABLET BY MOUTH every 12 hours AS NEEDED FOR SEVERE PAIN 6/18/21  Yes Joie Brady APRN   insulin aspart (novoLOG) 100 UNIT/ML injection Inject 5 Units under the skin into the appropriate area as directed 3 (Three) Times a Day With Meals. 1/29/20  Yes Chari Mercado MD   levothyroxine (SYNTHROID, LEVOTHROID) 88 MCG tablet TAKE ONE TABLET BY MOUTH EVERY DAY 6/25/21  Yes Chari Mercado MD   loratadine (Claritin) 10 MG tablet Take 10 mg by mouth Daily.   Yes Vimal Antonio MD   midodrine (PROAMATINE) 2.5 MG tablet TAKE ONE TABLET BY MOUTH THREE TIMES DAILY 6/25/21  Yes Kehinde Sanon MD   Mirabegron ER (Myrbetriq) 25 MG tablet sustained-release 24 hour 24 hr tablet Take 1 tablet by  "mouth Daily. 2/24/21  Yes Chari Mercado MD   Nebulizer device 1 each Take As Directed. 3/9/21  Yes Chari Mercado MD   Needle, Disp, (BD DISP NEEDLES) 30G X 1/2\" misc To be used 3 times daily with Novolog Flexpen. 8/4/16  Yes Chari Mercado MD   nitrofurantoin, macrocrystal-monohydrate, (MACROBID) 100 MG capsule Take 100 mg by mouth Daily. 3/12/21  Yes Vimal Antonio MD   nitrofurantoin, macrocrystal-monohydrate, (MACROBID) 100 MG capsule Take 100 mg by mouth 2 (Two) Times a Day.   Yes Vimal Antonio MD   nystatin (MYCOSTATIN) 833929 UNIT/GM cream Apply  topically to the appropriate area as directed 2 (two) times a day. 4/28/21  Yes Chari Mercado MD   O2 (OXYGEN) Inhale 2 L/min Every Night. Uses 2L  overnight only   Yes Vimal Antonio MD   omeprazole (priLOSEC) 40 MG capsule Take 1 capsule by mouth 2 (two) times a day. 5/7/21  Yes Liyah bAel APRN   ondansetron (ZOFRAN) 8 MG tablet Take 1 tablet by mouth 3 (Three) Times a Day As Needed for Nausea or Vomiting. 9/18/20  Yes Elieser Headley MD   phenazopyridine (PYRIDIUM) 200 MG tablet Take 200 mg by mouth 3 (Three) Times a Day. 7/31/20  Yes Vimal Antonio MD   potassium chloride 10 MEQ CR tablet TAKE ONE TABLET BY MOUTH EVERY DAY 4/9/21  Yes hCari Mercado MD   sennosides-docusate (senna-docusate sodium) 8.6-50 MG per tablet Take 2 tablets by mouth Daily. 12/2/20  Yes Chari Mercado MD   Tresiba FlexTouch 200 UNIT/ML solution pen-injector pen injection INJECT 54 UNITS subcutaneously AT BEDTIME 6/14/21  Yes Joie Brady APRN   UltiCare Mini Pen Needles 31G X 6 MM misc USE TO inject insulins FOUR TIMES DAILY AS DIRECTED 10/5/20  Yes Chari Mercado MD   Ventolin  (90 Base) MCG/ACT inhaler INHALE TWO PUFFS BY MOUTH EVERY 4 HOURS AS NEEDED FOR WHEEZING 12/29/20  Yes Chari Mercado MD       Allergies   Allergen Reactions   • Baclofen Anxiety    "  Panic attack, nightmares   • Codeine Itching and Rash   • Lisinopril Cough   • Morphine Hives   • Penicillins Rash     Tolerates cephalosporins        Social History     Socioeconomic History   • Marital status:      Spouse name: Not on file   • Number of children: 3   • Years of education: High School   • Highest education level: Not on file   Tobacco Use   • Smoking status: Never Smoker   • Smokeless tobacco: Never Used   • Tobacco comment: CAFFEINE USE: NONE   Vaping Use   • Vaping Use: Never used   Substance and Sexual Activity   • Alcohol use: No   • Drug use: No   • Sexual activity: Defer     Comment: EXERCISE - RARELY       Family History   Problem Relation Age of Onset   • Lupus Mother    • Heart failure Mother 59   • Heart disease Other    • Hypertension Other    • Heart attack Father    • Breast cancer Neg Hx        REVIEW OF SYSTEMS:   All systems reviewed.  Pertinent positives identified in HPI.  All other systems are negative.      Objective:     Vitals:    08/06/21 0730 08/06/21 0800 08/06/21 0821 08/06/21 0834   BP: 106/61 97/62 104/60    BP Location:  Left arm     Patient Position:  Lying     Pulse:  (!) 133 (!) 133    Resp:  20 18    Temp:  97.2 °F (36.2 °C)     TempSrc:  Oral     SpO2: 97% 96% 95%    Weight:    92.6 kg (204 lb 3.2 oz)   Height:         Body mass index is 38.58 kg/m².    General Appearance:    Alert, cooperative, in no acute distress   Head:    Normocephalic, without obvious abnormality, atraumatic   Eyes:            Lids and lashes normal, conjunctivae and sclerae normal, no icterus, no pallor, corneas clear   Ears:    Ears appear intact with no abnormalities noted   Throat:   No oral lesions, no thrush, oral mucosa moist   Neck:   No adenopathy, supple, trachea midline, no thyromegaly, no carotid bruit, no JVD   Back:     No kyphosis present, no scoliosis present, no skin lesions, erythema or scars, no tenderness to palpation, range of motion normal   Lungs:     Clear to  auscultation with exp rhonchi, respirations regular, even and unlabored    Heart:    irregular rhythm and tachycardic rate, normal S2, no murmur, no gallop, no rub, no click   Chest Wall:    No abnormalities observed   Abdomen:     Normal bowel sounds, no masses, no organomegaly, soft, nontender, nondistended, no guarding, no rebound  tenderness   Extremities:   Moves all extremities well, no edema, no cyanosis, no redness   Pulses:   Pulses palpable and equal bilaterally. Normal radial, carotid, dorsalis pedis and posterior tibial pulses bilaterally.    Skin:  Psychiatric:   No bleeding, bruising or rash    Alert and oriented x 3, normal mood and affect   Lab Review:     Results from last 7 days   Lab Units 08/06/21  0603   SODIUM mmol/L 136   POTASSIUM mmol/L 4.9   CHLORIDE mmol/L 102   CO2 mmol/L 26.2   BUN mg/dL 48*   CREATININE mg/dL 2.68*   CALCIUM mg/dL 9.1   BILIRUBIN mg/dL 0.7   ALK PHOS U/L 169*   ALT (SGPT) U/L 16   AST (SGOT) U/L 18   GLUCOSE mg/dL 96     Results from last 7 days   Lab Units 08/06/21  0603   TROPONIN T ng/mL <0.010     Results from last 7 days   Lab Units 08/06/21  0603   WBC 10*3/mm3 8.29   HEMOGLOBIN g/dL 11.3*   HEMATOCRIT % 37.7   PLATELETS 10*3/mm3 290     Results from last 7 days   Lab Units 08/06/21  0603   INR  1.58*     Results from last 7 days   Lab Units 08/06/21  0603   MAGNESIUM mg/dL 2.3                  Admission 08/06/2021:      Previous EKG 08/12/2020:        I personally viewed and interpreted the patient's EKG/Telemetry data.        Assessment and Plan:       1. PNA likely on CXR - check for covid.   2. Atrial flutter with RVR.  Will try to convert with IV Amio as did not have this at Dr. Mercado's office on 8/4/21 - pulse in 70s and now in 130s.  lovenox given, try IV dig  3. DM  4. CKD  5. Orthostasis.  6. H/o aspiration.   7. Chronic oxygen at home.     Some the abnormalities on chest x-ray may be due to heart failure.  Would recommend Covid testing.  She probably  needs to be recultured.  In the meantime will try amiodarone IV digoxin as well as Lovenox, check chest x-ray.  We will also give a couple doses of IV Lasix because I do think she has a component of heart failure here as well.  She may need CT of the chest to further delineate what was seen on the chest x-ray.    Addendum:converted to NSR with amio, change to po, echo normal.  Will chart check over weekend.         Neetu Chapin MD  08/06/21  08:58 EDT

## 2021-08-06 NOTE — H&P
"[facility] HOSPITALIST     Rogelio, Chari Chicas MD    CHIEF COMPLAINT: Shortness of air    HISTORY OF PRESENT ILLNESS:  The patient is a 81-year-old female, known to hospitalist service from prior admissions, but presented to the emergency department secondary to persistent shortness of air.  She has seen her primary care provider Dr. Mercado multiple times for the same concerns/recurrent or refractory pneumonias, and was recently placed on Omnicef and a azithromycin on 8/5/2021 and prior to that cefdinir and then moxifloxacin and of June without resolution.  She has been using her oxygen most days due to the shortness of air.  She further reports recurrent syncopal sensation, palpitations, longstanding dizziness that has become worse, nonproductive cough, intermittent chest pain lasting approximately 15 minutes that is not relieved with rest, nasal congestion, sinus pressure, fatigue/malaise, dysuria and burning with urination, decreased appetite, generalized weakness, choking with eating that has become worse, early satiety and intermittent nausea without vomiting. She states \"I just want some quality of life\", as she has felt poorly for some time.     Diagnostics in ER revealed atrial flutter requiring diltiazem drip, persistent left lower lobe masslike opacity, increasing patchy infiltrates bilateral lung bases.  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    She denies f/c/headache/v/d/chest pain/abdominal pain/sick exposures/change in bowel or " bladder habits/bloody emesis or bloody stools/change in medications or any other new concerns.    Past Medical History:   Diagnosis Date   • Allergic rhinitis    • Anemia    • Anxiety    • Appetite absent    • Arthritis    • Asthma    • Back pain    • Bell's palsy    • Black tarry stools    • Blood in stool    • Chronic fatigue    • CKD (chronic kidney disease) stage 3, GFR 30-59 ml/min (CMS/[facility])    • Community acquired pneumonia of left lung 10/25/2018   • Cough    • Depression    • Diabetes mellitus (CMS/[facility])     LAST A1C 6   • Diabetic gastroparesis (CMS/[facility]) 2/19/2016   • Difficulty walking    • Excessive urination at night    • Frequent urination    • GERD (gastroesophageal reflux disease)    • GI bleed    • Gout    • H/O blood clots     LEFT LEG 7 OR 8 YEARS AGO   • Heat intolerance    • History of fall 10/2018   • History of prior pregnancies     x8, miscarriage 5   • History of transfusion 11/2018    due to anemia   • Hyperlipidemia    • Hypertension    • Hypothyroidism    • Normal coronary arteries     by cath 2013   • Orthostatic hypotension    • AARON (obstructive sleep apnea)     DOESNT WEAR REGULARLY   • Pneumonia    • PONV (postoperative nausea and vomiting)    • Skin cancer    • Stroke (CMS/[facility])     Several mini-strokes   • TIA (transient ischemic attack)     LAST TIA JULY 2017   • Urination pain    • UTI (urinary tract infection)     Dec 2018 and Jan 2019     Past Surgical History:   Procedure Laterality Date   • BACK SURGERY      HARDWARE   • CHOLECYSTECTOMY      OPEN   • COLONOSCOPY  2011    due for repeat in 2021   • COLONOSCOPY N/A 10/28/2018    Procedure: COLONOSCOPY;  Surgeon: Emmanuel Rogers MD;  Location: [facility] OR;  Service: Gastroenterology   • ENDOSCOPY N/A 10/26/2018    Procedure: ESOPHAGOGASTRODUODENOSCOPY;  Surgeon: Emmanuel Rogers MD;  Location: [facility] OR;  Service: Gastroenterology   • ENDOSCOPY N/A 5/13/2021    Procedure: ESOPHAGOGASTRODUODENOSCOPY, biopsy,  dilatation;  Surgeon: Emmanuel Rogers MD;  Location: [facility];  Service: Gastroenterology;  Laterality: N/A;  Esophagitis; Dilation- no stricture; Biopsy- esophagus   • HYSTERECTOMY      PARTIAL    • KNEE SURGERY     • NECK SURGERY     • SPINE SURGERY     • TUMOR REMOVAL Left     Leg   • UPPER GASTROINTESTINAL ENDOSCOPY  2014    gastritis.  done by dr. hastings     Family History   Problem Relation Age of Onset   • Lupus Mother    • Heart failure Mother 59   • Heart disease Other    • Hypertension Other    • Heart attack Father    • Breast cancer Neg Hx      Social History     Tobacco Use   • Smoking status: Never Smoker   • Smokeless tobacco: Never Used   • Tobacco comment: CAFFEINE USE: NONE   Vaping Use   • Vaping Use: Never used   Substance Use Topics   • Alcohol use: No   • Drug use: No     Medications Prior to Admission   Medication Sig Dispense Refill Last Dose   • ACCU-CHEK FASTCLIX LANCETS misc TEST 3-4 TIMES DAILY AS DIRECTED 400 each 3    • ACCU-CHEK SMARTVIEW test strip TEST BLOOD SUGAR THREE TIMES DAILY OR AS DIRECTED 300 each 3    • acetaminophen (TYLENOL) 325 MG tablet Take 2 tablets by mouth Every 6 (Six) Hours As Needed for Mild Pain . OTC product 40 tablet 0    • albuterol (PROVENTIL) (2.5 MG/3ML) 0.083% nebulizer solution Take 2.5 mg by nebulization Every 4 (Four) Hours As Needed for Wheezing. 100 each 2    • apixaban (Eliquis) 5 MG tablet tablet Take 1 tablet by mouth 2 (Two) Times a Day. 180 tablet 1 8/5/2021 at Unknown time   • atorvastatin (LIPITOR) 10 MG tablet TAKE ONE TABLET BY MOUTH AT BEDTIME 90 tablet 1 8/5/2021 at Unknown time   • azithromycin (Zithromax) 250 MG tablet Take 2 tablets the first day, then 1 tablet daily for 4 days. 6 tablet 0 8/5/2021 at Unknown time   • Blood Glucose Monitoring Suppl (ACCU-CHEK KENNETH SMARTVIEW) w/Device kit TEST blood sugar three times daily or as directed 1 kit 0    • cefdinir (OMNICEF) 300 MG capsule Take 1 capsule by mouth 2 (Two) Times a  Day for 10 days. 20 capsule 0 8/5/2021 at Unknown time   • cyclobenzaprine (FLEXERIL) 10 MG tablet TAKE ONE TABLET BY MOUTH THREE TIMES DAILY as needed for muscle spasms 45 tablet 0 8/5/2021 at Unknown time   • desvenlafaxine (PRISTIQ) 50 MG 24 hr tablet TAKE ONE TABLET BY MOUTH EVERY DAY 90 tablet 1 8/5/2021 at Unknown time   • Diclofenac Sodium (Voltaren) 1 % gel gel Apply 4 g topically to the appropriate area as directed 4 (Four) Times a Day As Needed (shoulder pain). 400 g 2    • dicyclomine (Bentyl) 10 MG capsule Take 1 cap po q 8 hours prn spasm 60 capsule 0 8/5/2021 at Unknown time   • famotidine (PEPCID) 40 MG tablet Take 1 tablet by mouth 2 (Two) Times a Day. With lunch and at bedtime 180 tablet 3 8/5/2021 at Unknown time   • furosemide (LASIX) 20 MG tablet TAKE ONE TABLET BY MOUTH EVERY DAY Take ONE additional pill daily as needed for swelling 180 tablet 1 8/5/2021 at Unknown time   • gabapentin (NEURONTIN) 400 MG capsule TAKE ONE CAPSULE BY MOUTH EVERY MORNING, AT NOON, AND TWO AT BEDTIME 120 capsule 2 8/5/2021 at Unknown time   • HYDROcodone-acetaminophen (NORCO) 5-325 MG per tablet TAKE ONE TABLET BY MOUTH every 12 hours AS NEEDED FOR SEVERE PAIN 30 tablet 0 8/5/2021 at Unknown time   • insulin aspart (novoLOG) 100 UNIT/ML injection Inject 5 Units under the skin into the appropriate area as directed 3 (Three) Times a Day With Meals.  12 8/5/2021 at Unknown time   • levothyroxine (SYNTHROID, LEVOTHROID) 88 MCG tablet TAKE ONE TABLET BY MOUTH EVERY DAY 90 tablet 1 8/5/2021 at Unknown time   • loratadine (Claritin) 10 MG tablet Take 10 mg by mouth Daily.   8/5/2021 at Unknown time   • midodrine (PROAMATINE) 2.5 MG tablet TAKE ONE TABLET BY MOUTH THREE TIMES DAILY 270 tablet 3 8/5/2021 at Unknown time   • Mirabegron ER (Myrbetriq) 25 MG tablet sustained-release 24 hour 24 hr tablet Take 1 tablet by mouth Daily. 30 tablet 5 8/5/2021 at Unknown time   • Nebulizer device 1 each Take As Directed. 1 each 0    •  "Needle, Disp, (BD DISP NEEDLES) 30G X 1/2\" misc To be used 3 times daily with Novolog Flexpen. 100 each 5    • nitrofurantoin, macrocrystal-monohydrate, (MACROBID) 100 MG capsule Take 100 mg by mouth Daily.   8/5/2021 at Unknown time   • nitrofurantoin, macrocrystal-monohydrate, (MACROBID) 100 MG capsule Take 100 mg by mouth 2 (Two) Times a Day.   8/5/2021 at Unknown time   • O2 (OXYGEN) Inhale 2 L/min Every Night. Uses 2L  overnight only   8/6/2021 at Unknown time   • omeprazole (priLOSEC) 40 MG capsule Take 1 capsule by mouth 2 (two) times a day. 180 capsule 3 8/5/2021 at Unknown time   • ondansetron (ZOFRAN) 8 MG tablet Take 1 tablet by mouth 3 (Three) Times a Day As Needed for Nausea or Vomiting. 30 tablet 5    • phenazopyridine (PYRIDIUM) 200 MG tablet Take 200 mg by mouth 3 (Three) Times a Day.   8/5/2021 at Unknown time   • potassium chloride 10 MEQ CR tablet TAKE ONE TABLET BY MOUTH EVERY DAY 90 tablet 2 8/5/2021 at Unknown time   • sennosides-docusate (senna-docusate sodium) 8.6-50 MG per tablet Take 2 tablets by mouth Daily. 60 tablet 3 8/5/2021 at Unknown time   • Tresiba FlexTouch 200 UNIT/ML solution pen-injector pen injection INJECT 54 UNITS subcutaneously AT BEDTIME 9 mL 11 8/5/2021 at Unknown time   • UltiCare Mini Pen Needles 31G X 6 MM misc USE TO inject insulins FOUR TIMES DAILY AS DIRECTED 400 each 3 8/5/2021 at Unknown time   • Ventolin  (90 Base) MCG/ACT inhaler INHALE TWO PUFFS BY MOUTH EVERY 4 HOURS AS NEEDED FOR WHEEZING 18 g 3 8/5/2021 at Unknown time   • nystatin (MYCOSTATIN) 639308 UNIT/GM cream Apply  topically to the appropriate area as directed 2 (two) times a day. 30 g 0 More than a month at Unknown time     Allergies:  Baclofen, Codeine, Lisinopril, Morphine, and Penicillins    Immunization History   Administered Date(s) Administered   • COVID-19 (PFIZER) 03/11/2021, 04/01/2021   • Flu Mist 09/15/2015   • Fluad Quad 65+ 09/16/2020   • Fluzone High Dose =>65 Years ([employer] " "ONLY) 09/26/2017, 09/28/2018, 09/03/2019   • Pneumococcal Polysaccharide (PPSV23) 11/06/2017   • Shingrix 06/09/2020       REVIEW OF SYSTEMS:  Please see the above history of present illness for pertinent positives and negatives.  The remainder of the patient's systems have been reviewed and are negative.     Vital Signs  Temp:  [97.2 °F (36.2 °C)-98 °F (36.7 °C)] 97.2 °F (36.2 °C)  Heart Rate:  [131-135] 133  Resp:  [18-22] 18  BP: ()/(43-62) 104/60   Body mass index is 38.58 kg/m².    Flowsheet Rows      First Filed Value   Admission Height  154.9 cm (61\") Documented at 08/06/2021 0600   Admission Weight  87 kg (191 lb 12.8 oz) Documented at 08/06/2021 0600           Physical Exam  Vitals reviewed.   Constitutional:       Appearance: She is obese.   HENT:      Head: Normocephalic and atraumatic.      Comments: Chronic left facial droop     Mouth/Throat:      Mouth: Mucous membranes are moist.   Eyes:      Extraocular Movements: Extraocular movements intact.      Pupils: Pupils are equal, round, and reactive to light.   Cardiovascular:      Rate and Rhythm: Tachycardia present. Rhythm irregular.   Pulmonary:      Effort: Pulmonary effort is normal.      Breath sounds: Normal breath sounds.      Comments: Diminished bilateral bases  Abdominal:      General: Abdomen is flat. Bowel sounds are normal. There is no distension.      Palpations: Abdomen is soft.      Tenderness: There is no abdominal tenderness. There is no guarding.   Musculoskeletal:         General: No swelling.   Skin:     General: Skin is warm and dry.      Capillary Refill: Capillary refill takes less than 2 seconds.      Findings: No erythema.   Neurological:      General: No focal deficit present.      Mental Status: She is alert and oriented to person, place, and time.   Psychiatric:         Mood and Affect: Mood normal.         Behavior: Behavior normal.        Results Review:    I reviewed the patient's new clinical results.  Lab Results " (most recent)     Procedure Component Value Units Date/Time    Blood Culture - Blood, Arm, Right [[id number]] Collected: 08/06/21 0603    Specimen: Blood from Arm, Right Updated: 08/06/21 0714    COVID-19,[facility] IN-HOUSE,Nasal Swab No Transport Media 3-4 HR TAT - Swab, Nasal Cavity [[id number]]  (Normal) Collected: 08/06/21 0623    Specimen: Swab from Nasal Cavity Updated: 08/06/21 0709     COVID19 Not Detected    Narrative:      Fact sheet for providers: https://www.fda.gov/media/210083/download     Fact sheet for patients: https://www.fda.gov/media/059570/download    Test performed by PCR.    Consider negative results in combination with clinical observations, patient history, and epidemiological information.    Procalcitonin [[id number]]  (Abnormal) Collected: 08/06/21 0603    Specimen: Blood Updated: 08/06/21 0654     Procalcitonin 0.71 ng/mL     Narrative:      Results may be falsely decreased if patient taking Biotin.     BNP [[id number]]  (Abnormal) Collected: 08/06/21 0603    Specimen: Blood Updated: 08/06/21 0649     proBNP 3,842.0 pg/mL     Narrative:      Among patients with dyspnea, NT-proBNP is highly sensitive for the detection of acute congestive heart failure. In addition NT-proBNP of <300 pg/ml effectively rules out acute congestive heart failure with 99% negative predictive value.    Results may be falsely decreased if patient taking Biotin.      TSH [[id number]]  (Abnormal) Collected: 08/06/21 0603    Specimen: Blood Updated: 08/06/21 0647     TSH 7.190 uIU/mL     T4, Free [[id number]]  (Normal) Collected: 08/06/21 0603    Specimen: Blood Updated: 08/06/21 0647     Free T4 1.41 ng/dL     Narrative:      Results may be falsely increased if patient taking Biotin.      Troponin [[id number]]  (Normal) Collected: 08/06/21 0603    Specimen: Blood Updated: 08/06/21 0639     Troponin T <0.010 ng/mL     Narrative:      Troponin T Reference Range:  <= 0.03 ng/mL-   Negative for AMI  >0.03 ng/mL-     Abnormal for  myocardial necrosis.  Clinicians would have to utilize clinical acumen, EKG, Troponin and serial changes to determine if it is an Acute Myocardial Infarction or myocardial injury due to an underlying chronic condition.       Results may be falsely decreased if patient taking Biotin.      Lactic Acid, Plasma [[id number]]  (Normal) Collected: 08/06/21 0603    Specimen: Blood Updated: 08/06/21 0634     Lactate 1.2 mmol/L     Protime-INR [[id number]]  (Abnormal) Collected: 08/06/21 0603    Specimen: Blood Updated: 08/06/21 0633     Protime 18.7 Seconds      INR 1.58    Narrative:      Therapeutic Ranges for INR: 2.0-3.0 (PT 20-30)                              2.5-3.5 (PT 25-34)    Magnesium [[id number]]  (Normal) Collected: 08/06/21 0603    Specimen: Blood Updated: 08/06/21 0630     Magnesium 2.3 mg/dL     Comprehensive Metabolic Panel [[id number]]  (Abnormal) Collected: 08/06/21 0603    Specimen: Blood Updated: 08/06/21 0625     Glucose 96 mg/dL      BUN 48 mg/dL      Creatinine 2.68 mg/dL      Sodium 136 mmol/L      Potassium 4.9 mmol/L      Chloride 102 mmol/L      CO2 26.2 mmol/L      Calcium 9.1 mg/dL      Total Protein 6.6 g/dL      Albumin 3.60 g/dL      ALT (SGPT) 16 U/L      AST (SGOT) 18 U/L      Alkaline Phosphatase 169 U/L      Total Bilirubin 0.7 mg/dL      eGFR Non African Amer 17 mL/min/1.73      Globulin 3.0 gm/dL      A/G Ratio 1.2 g/dL      BUN/Creatinine Ratio 17.9     Anion Gap 7.8 mmol/L     Narrative:      GFR Normal >60  Chronic Kidney Disease <60  Kidney Failure <15      CBC & Differential [[id number]]  (Abnormal) Collected: 08/06/21 0603    Specimen: Blood Updated: 08/06/21 0612    Narrative:      The following orders were created for panel order CBC & Differential.  Procedure                               Abnormality         Status                     ---------                               -----------         ------                     CBC Auto Differential[[id number]]        Abnormal             Final result                 Please view results for these tests on the individual orders.    CBC Auto Differential [[id number]]  (Abnormal) Collected: 08/06/21 0603    Specimen: Blood Updated: 08/06/21 0612     WBC 8.29 10*3/mm3      RBC 3.83 10*6/mm3      Hemoglobin 11.3 g/dL      Hematocrit 37.7 %      MCV 98.4 fL      MCH 29.5 pg      MCHC 30.0 g/dL      RDW 22.2 %      RDW-SD 76.5 fl      MPV 11.6 fL      Platelets 290 10*3/mm3      Neutrophil % 82.0 %      Lymphocyte % 7.6 %      Monocyte % 3.1 %      Eosinophil % 4.1 %      Basophil % 1.4 %      Immature Grans % 1.8 %      Neutrophils, Absolute 6.79 10*3/mm3      Lymphocytes, Absolute 0.63 10*3/mm3      Monocytes, Absolute 0.26 10*3/mm3      Eosinophils, Absolute 0.34 10*3/mm3      Basophils, Absolute 0.12 10*3/mm3      Immature Grans, Absolute 0.15 10*3/mm3      nRBC 0.0 /100 WBC           Imaging Results (Most Recent)     Procedure Component Value Units Date/Time    XR Chest 1 View [[id number]] Collected: 08/06/21 0722     Updated: 08/06/21 0730    Narrative:      CHEST X-RAY, 08/06/2021         HISTORY:  81-year-old female currently being admitted to the hospital through the  ED with cough, shortness of air, chest pain and nausea. Atrial flutter.  Basilar infiltrates on chest x-ray 2 days ago.     TECHNIQUE:  AP portable upright chest x-ray.     COMPARISON:  *  Chest x-ray, 8/4/2021 and 6/30/2021.     FINDINGS:  Dense opacification of the medial left lung base is again noted and has  been present since 06/30/2021. Patchy, slightly nodular infiltrates in  both lung bases have increased since the exam. Consider CT evaluation of  the chest for more detailed characterization.     Mild cardiomegaly. Prominent central pulmonary arteries. Upper lungs are  clear. Pulmonary vascularity is normal. Advanced chronic arthropathy  involving the left shoulder.       Impression:      1.  Masslike opacity or consolidation of the medial left lung base  behind the heart,  unchanged since 06/30/2021. Consider chest CT,  preferably with IV contrast, for further characterization.  2.  Increasing patchy infiltrates in both lung bases.     This report was finalized on 8/6/2021 7:28 AM by Dr. Jacques Bates MD.           reviewed    ECG/EMG Results (most recent)     Procedure Component Value Units Date/Time    ECG 12 Lead [[id number]] Collected: 08/06/21 0600     Updated: 08/06/21 0604     QT Interval 330 ms     Narrative:      HEART RATE= 133  bpm  RR Interval= 452  ms  MA Interval=   ms  P Horizontal Axis=   deg  P Front Axis=   deg  QRSD Interval= 142  ms  QT Interval= 330  ms  QRS Axis= -67  deg  T Wave Axis= 9  deg  - ABNORMAL ECG -  Atrial flutter with predominant 2:1 AV block  Right bundle branch block  Electronically Signed By:   Date and Time of Study: 2021-08-06 06:00:40        reviewed    Assessment/Plan    AHR F:  Acute/recurrent aspiration pneumonia: Pulmonary consulted  Consult SLP to evaluate swallow  Aspiration precautions, head of bed elevated  MBSS ordered due to history of aspiration and report of choking  Will likely need CT chest, discussed with pulmonary  Trend procalcitonin  Check RVP/sputum culture/MRSA swab  Add albuterol as needed, Mucinex twice daily, Acapella  Received Levaquin in ER, change to cefepime, consider need for vancomycin as well    New onset atrial fibrillation/flutter: Cardiology following  Chronic diastolic heart failure:  Hypertension with history of orthostatic hypotension:  Hyperlipidemia:  Diltiazem drip/amiodarone drip with HR not yet at goal  BP at/low goal, allow home midodrine  Echo pending  Strict I&O, daily weight  Monitor in ICU    JOYCE on CKD stage IV:  Creatinine currently 2.68, baseline approximately 2.2  We will add gentle IV hydration, patient clinically dry    Transfusion dependent myelodysplastic syndrome: Recent transfusion, hemoglobin stable at 11.3    DM2 in obese with diabetic gastroparesis and retinopathy: A1C 6.1%  Body  mass index is 38.58 kg/m².   Add partial dose Levemir, substitute for home Tresiba  Add low-dose sliding scale insulin with Accu-Cheks AC/at bedtime    Decreased appetite/weight loss, r/o malnutrition: consult dietician    History of esophageal stricture: nothing acute currently, but as above, MBSS ordered due to history and suspected aspiration    History of DVT on Eliquis: Changed to therapeutic dose Lovenox for now in case of intervention    Chronic Bell's palsy: Chronic right facial droop    Gait instability: consult PT/OT  Falls precautions    H/O recurrent UTIs:  H/O OAB:   Check cath UA C&S    Depression: No current acute issues, add home Pristiq    Hypothyroidism: TSH elevated, but given unknown amount of time in A-fib, recommend repeat TFTs outpatient after resolution to determine need to increase dosing    I discussed the patient's findings and my recommendations with patient and staff.     Alison Olivera, APRN  08/06/21  09:43 EDT

## 2021-08-06 NOTE — PROGRESS NOTES
Moderate malnutrition: dietician following  Boost glucose control added, renal restriction on diet lifted

## 2021-08-06 NOTE — NURSING NOTE
Follow up ECG showing NSR with HR 60's. amio gtt infusing at 33.3 until around 4pm then will cut dose in half. Notified  Ely with cardiology. will continue amio gtt as ordered and monitor heart rate. Notified Np that  Bp is low at times with noted DBP 40's,but improved after cardizem gtt was stopped.. Pt is asymptomatic other than feeling very tired and no appetite which is not new for her.  No new orders at this time.

## 2021-08-06 NOTE — SIGNIFICANT NOTE
08/06/21 1028   OTHER   Discipline occupational therapist   Rehab Time/Intention   Session Not Performed   (hold per RN, not medically stable to participate at this time)

## 2021-08-06 NOTE — SIGNIFICANT NOTE
08/06/21 1312   OTHER   Discipline physical therapist   Rehab Time/Intention   Session Not Performed unable to evaluate, medical status change  (PT: Hold PT evaluation per RN due to medical status. Will check back tomorrow.)

## 2021-08-06 NOTE — CASE MANAGEMENT/SOCIAL WORK
Discharge Planning Assessment   Angie Doran     Patient Name: Joya Singh  MRN: [id number]  Today's Date: 8/6/2021    Admit Date: 8/6/2021    Discharge Needs Assessment     Row Name 08/06/21 1323       Living Environment    Lives With  child(isabel), adult    Name(s) of Who Lives With Patient  Zeb Singh son & dtr in law    Current Living Arrangements  home/apartment/condo    Primary Care Provided by  self    Provides Primary Care For  no one    Family Caregiver if Needed  child(isabel), adult    Quality of Family Relationships  involved;supportive    Able to Return to Prior Arrangements  yes       Resource/Environmental Concerns    Resource/Environmental Concerns  none    Transportation Concerns  car, none       Transition Planning    Patient/Family Anticipates Transition to  home with family    Patient/Family Anticipated Services at Transition  none       Discharge Needs Assessment    Readmission Within the Last 30 Days  no previous admission in last 30 days    Equipment Currently Used at Home  wheelchair, motorized;oxygen;glucometer;nebulizer    Concerns to be Addressed  no discharge needs identified    Anticipated Changes Related to Illness  none    Equipment Needed After Discharge  none    Provided Post Acute Provider List?  N/A    Provided Post Acute Provider Quality & Resource List?  N/A        Discharge Plan     Row Name 08/06/21 1326       Plan    Plan  plan home with son & dtr-in-law    Patient/Family in Agreement with Plan  yes    Plan Comments  Spoke with patient at bedside, face sheet verified. Patient lives in a home with her son and daughter in law. She is independent of ADLs including driving. She uses a motorized chair and also has a cane, rw, gluc, neb  and 02@ 2L [facility]. She is current with [facility] and has not used inpatient rehab previously. She has a living will on file. She uses [facility] and states they deliver her medications to the home. There are no issues  affording her Rxs. She states the plan will be to return home with her son, dtr-in-law and [facility]. CM will continue to follow.        Continued Care and Services - Admitted Since 8/6/2021    Coordination has not been started for this encounter.         Demographic Summary     Row Name 08/06/21 1323       General Information    Admission Type  inpatient    Arrived From  home    Referral Source  admission list    Reason for Consult  discharge planning    Preferred Language  English     Used During This Interaction  no       Contact Information    Permission Granted to Share Info With          Functional Status    No documentation.       Psychosocial    No documentation.       Abuse/Neglect    No documentation.       Legal    No documentation.       Substance Abuse    No documentation.       Patient Forms    No documentation.           Alan Gruber RN

## 2021-08-07 NOTE — PLAN OF CARE
Goal Outcome Evaluation:  Plan of Care Reviewed With: patient           Outcome Summary: OT evaluation completed. pt required min assist for supine to sit transfer. pt required CGA for sit to stand transfer and stand pivot transfer from bed to chair with RW. pt able to scoot self back into chair with supervison. anticipate pt will require mod assist for lb adls. pt with co pain in L shoulder and chronic dizziness and nausea throughout evaluation. pt would benefit from skilled OT services to address adl retraining, ae education, and transfers. will see pt while in hospital, pt may benefit from SNF upon discharge.

## 2021-08-07 NOTE — THERAPY EVALUATION
Acute Care - Occupational Therapy Initial Evaluation  VICKY Sharma     Patient Name: Joya Singh  : 1940  MRN: [id number]  Today's Date: 2021  Onset of Illness/Injury or Date of Surgery: 21  Date of Referral to OT: 21  Referring Physician: Rashmi LE    Admit Date: 2021       ICD-10-CM ICD-9-CM   1. Atrial flutter, unspecified type (CMS/formerly [facility])  I48.92 427.32   2. Pneumonia of left lower lobe due to infectious organism  J18.9 486   3. JOYCE (acute kidney injury) (CMS/formerly [facility])  N17.9 584.9     Patient Active Problem List   Diagnosis   • Arthritis   • Chronic back pain   • Chronic anemia   • Anxiety and depression   • Type 2 diabetes mellitus with neurologic complication (CMS/formerly [facility])   • Brittle diabetes mellitus (CMS/formerly [facility])   • Dizzy   • Hyperlipidemia   • Hypertension   • Insomnia with sleep apnea   • Obstructive sleep apnea syndrome   • Peripheral neuropathy   • Diabetic gastroparesis (CMS/formerly [facility])   • Primary localized osteoarthrosis of left shoulder region   • Rotator cuff tendonitis   • Acquired hypothyroidism   • Anxiety   • History of biliary T-tube placement   • CKD stage 3 due to type 2 diabetes mellitus (CMS/formerly [facility])   • Complex tear of medial meniscus of right knee as current injury   • Insufficiency fracture of tibia   • Primary osteoarthritis of left knee   • Orthostatic hypotension   • Tendinitis of right rotator cuff   • AC joint arthropathy   • Subacromial impingement of right shoulder   • Right carpal tunnel syndrome   • Anemia requiring transfusions   • Monoclonal gammopathy of unknown significance (MGUS)   • Myelodysplastic syndrome with 5q deletion (CMS/formerly [facility])   • History of deep venous thrombosis (DVT) of distal vein of left lower extremity   • Moderate episode of recurrent major depressive disorder (CMS/formerly [facility])   • Chronic diastolic CHF (congestive heart failure) (CMS/formerly [facility])   • Knee pain   • Primary osteoarthritis of right knee   • Iron overload, transfusional   • Uncontrolled type 2  diabetes mellitus with hyperglycemia (CMS/[facility])   • Localized edema   • Type 2 diabetes mellitus with diabetic neuropathy (CMS/[facility])   • Vitamin D deficiency   • Subacromial bursitis of left shoulder joint   • Gastroesophageal reflux disease with esophagitis without hemorrhage   • Esophageal dysphagia   • Anemia in neoplastic disease   • Leukocytes in urine   • Atrial flutter (CMS/[facility])   • Moderate malnutrition (CMS/[facility])     Past Medical History:   Diagnosis Date   • Allergic rhinitis    • Anemia    • Anxiety    • Appetite absent    • Arthritis    • Asthma    • Back pain    • Bell's palsy    • Black tarry stools    • Blood in stool    • Chronic fatigue    • CKD (chronic kidney disease) stage 3, GFR 30-59 ml/min (CMS/[facility])    • Community acquired pneumonia of left lung 10/25/2018   • Cough    • Depression    • Diabetes mellitus ([facility])     LAST A1C 6   • Diabetic gastroparesis (CMS/HCC) 2/19/2016   • Difficulty walking    • Excessive urination at night    • Frequent urination    • GERD (gastroesophageal reflux disease)    • GI bleed    • Gout    • H/O blood clots     LEFT LEG 7 OR 8 YEARS AGO   • Heat intolerance    • History of fall 10/2018   • History of prior pregnancies     x8, miscarriage 5   • History of transfusion 11/2018    due to anemia   • Hyperlipidemia    • Hypertension    • Hypothyroidism    • Normal coronary arteries     by cath 2013   • Orthostatic hypotension    • AARON (obstructive sleep apnea)     DOESNT WEAR REGULARLY   • Pneumonia    • PONV (postoperative nausea and vomiting)    • Skin cancer    • Stroke (CMS/[facility])     Several mini-strokes   • TIA (transient ischemic attack)     LAST TIA JULY 2017   • Urination pain    • UTI (urinary tract infection)     Dec 2018 and Jan 2019     Past Surgical History:   Procedure Laterality Date   • BACK SURGERY      HARDWARE   • CHOLECYSTECTOMY      OPEN   • COLONOSCOPY  2011    due for repeat in 2021   • COLONOSCOPY N/A 10/28/2018    Procedure: COLONOSCOPY;   Surgeon: Emmanuel Rogers MD;  Location:  [facility];  Service: Gastroenterology   • ENDOSCOPY N/A 10/26/2018    Procedure: ESOPHAGOGASTRODUODENOSCOPY;  Surgeon: Emmanuel Rogers MD;  Location:  [facility];  Service: Gastroenterology   • ENDOSCOPY N/A 5/13/2021    Procedure: ESOPHAGOGASTRODUODENOSCOPY, biopsy, dilatation;  Surgeon: Emmanuel Rogers MD;  Location:  [facility];  Service: Gastroenterology;  Laterality: N/A;  Esophagitis; Dilation- no stricture; Biopsy- esophagus   • HYSTERECTOMY      PARTIAL    • KNEE SURGERY     • NECK SURGERY     • SPINE SURGERY     • TUMOR REMOVAL Left     Leg   • UPPER GASTROINTESTINAL ENDOSCOPY  2014    gastritis.  done by dr. hastings            OT ASSESSMENT FLOWSHEET (last 12 hours)      OT Evaluation and Treatment     Row Name 08/07/21 0900                   OT Time and Intention    Subjective Information  complains of;dizziness;pain;nausea/vomiting;weakness  -JJ        Document Type  evaluation  -JJ        Mode of Treatment  occupational therapy  -JJ        Patient Effort  good  -JJ        Comment  pt with co chronic dizziness throughout evaluation, states does not improve in sitting  -JJ           General Information    Patient Profile Reviewed  yes  -JJ        Onset of Illness/Injury or Date of Surgery  08/06/21  -JJ        Referring Physician  Rashmi LE  -JJ        General Observations of Patient  pt supine in bed, agreed to evaluation  -JJ        Prior Level of Function  -- see pertinent hx of current problem  -JJ        Equipment Currently Used at Home  wheelchair, motorized;shower chair;cane, straight;power chair, (recliner lift)  -JJ        Pertinent History of Current Functional Problem  pt admitted to hospital with co SOA, generalized weakness and fatigue. pt dx with aspiration pna and new onset afib. pt states she uses motorized scooter for all mobility and sleeps in a lift chair. pt states she is I with adls.  -JJ         "Existing Precautions/Restrictions  fall  -JJ        Risks Reviewed  patient:;increased discomfort  -JJ        Benefits Reviewed  patient:;improve function;increase independence  -JJ        Barriers to Rehab  previous functional deficit  -JJ           Previous Level of Function/Home Environm    BADLs, Premorbid Functional Level  independent  -JJ        IADLs, Premorbid Functional Level  needs assistance for safe performance  -JJ           Living Environment    Current Living Arrangements  home/apartment/condo  -JJ        Lives With  child(isabel), adult  -JJ           Cognition    Orientation Status (Cognition)  oriented x 3  -JJ        Follows Commands (Cognition)  WNL  -JJ        Personal Safety Interventions  gait belt;nonskid shoes/slippers when out of bed  -JJ           Pain Assessment    Additional Documentation  Pain Scale: Numbers Pre/Post-Treatment (Group)  -JJ           Pain Scale: Numbers Pre/Post-Treatment    Pre/Posttreatment Pain Comment  pt co chronic pain in L shoulder with mobility, did not rate. improves at rest. pt states \" I have a pinched nerve.\"  -JJ           Range of Motion (ROM)    Range of Motion  bilateral upper extremities;ROM is WFL with exception of L shoulder ricardo 50%  -JJ           Strength (Manual Muscle Testing)    Strength (Manual Muscle Testing)  bilateral upper extremities;strength is WFL with exception of L shoulder, not tested  -JJ           Bed Mobility    Bed Mobility  supine-sit  -JJ        Supine-Sit [place] (Bed Mobility)  minimum assist (75% patient effort)  -JJ        Assistive Device (Bed Mobility)  bed rails;head of bed elevated  -JJ        Comment (Bed Mobility)  pt required extended time to transfer to EOB  -JJ           Transfer Assessment/Treatment    Transfers  sit-stand transfer;stand-sit transfer;stand pivot/stand step transfer  -JJ        Comment (Transfers)  pt required cues for hand placement  -JJ           Transfers    Sit-Stand [place] (Transfers)  " contact guard;verbal cues;2 person assist  -JJ        Stand-Sit [place] (Transfers)  contact guard;verbal cues;2 person assist  -JJ           Sit-Stand Transfer    Assistive Device (Sit-Stand Transfers)  walker, front-wheeled  -JJ           Stand-Sit Transfer    Assistive Device (Stand-Sit Transfers)  walker, front-wheeled  -JJ           Stand Pivot/Stand Step Transfer    Stand Pivot/Stand Step [place] (Transfers)  contact guard;verbal cues;2 person assist pt required assist to manage lines  -JJ        Assistive Device (Stand Pivot Stand Step Transfer)  walker, front-wheeled  -JJ           Balance    Comment, Balance  static standing balance CGA with RW  -JJ           Bathing Assessment/Intervention    [place] Level (Bathing)  lower body;moderate assist (50% patient effort)  -JJ           Lower Body Dressing Assessment/Training    [place] Level (Lower Body Dressing)  lower body dressing skills;moderate assist (50% patient effort)  -JJ           Plan of Care Review    Plan of Care Reviewed With  patient  -JJ        Outcome Summary  OT evaluation completed. pt required min assist for supine to sit transfer. pt required CGA for sit to stand transfer and stand pivot transfer from bed to chair with RW. pt able to scoot self back into chair with supervison. anticipate pt will require mod assist for lb adls. pt with co pain in L shoulder and chronic dizziness and nausea throughout evaluation. pt would benefit from skilled OT services to address adl retraining, ae education, and transfers. will see pt while in hospital, pt may benefit from SNF upon discharge.   -JJ           Transfer Goal 1 (OT)    Activity/Assistive Device (Transfer Goal 1, OT)  toilet;commode, 3-in-1;walker, rolling  -JJ        [place] Level/Cues Needed (Transfer Goal 1, OT)  standby assist  -JJ        Time Frame (Transfer Goal 1, OT)  5 days  -JJ        Progress/Outcome (Transfer Goal 1, OT)  -- new goal  -JJ           Bathing  "Goal 1 (OT)    Activity/Device (Bathing Goal 1, OT)  lower body bathing;long-handled sponge  -JJ        [place] Level/Cues Needed (Bathing Goal 1, OT)  standby assist  -JJ        Time Frame (Bathing Goal 1, OT)  5 days  -JJ        Progress/Outcomes (Bathing Goal 1, OT)  -- new goal  -JJ           Dressing Goal 1 (OT)    Activity/Device (Dressing Goal 1, OT)  lower body dressing with long handled ae if needed  -JJ        [place]/Cues Needed (Dressing Goal 1, OT)  standby assist  -JJ        Time Frame (Dressing Goal 1, OT)  5 days  -JJ        Progress/Outcome (Dressing Goal 1, OT)  -- new goal  -JJ           Patient Education Goal (OT)    Activity (Patient Education Goal, OT)  pt will demonstrate I with B UE AROM HEP  -JJ        Time Frame (Patient Education Goal, OT)  5 days  -JJ        Progress/Outcome (Patient Education Goal, OT)  -- new goal  -JJ           Positioning and Restraints    Pre-Treatment Position  in bed  -JJ        Post Treatment Position  chair  -JJ        In Chair  reclined;call light within reach;notified nsg;encouraged to call for assist  -           Therapy Assessment/Plan (OT)    Date of Referral to OT  08/06/21  -        Patient/Family Therapy Goal Statement (OT)  pt states she \"just wants my dizziness to be fixed.\"  -        OT Diagnosis  decreased adl 2 hospitalization  -        Rehab Potential (OT)  good, to achieve stated therapy goals  -        Criteria for Skilled Therapeutic Interventions Met (OT)  yes;skilled treatment is necessary  -        Therapy Frequency (OT)  5 times/wk  -        Predicted Duration of Therapy Intervention (OT)  x 1 week  -        Problem List (OT)  balance;mobility;strength;pain  -        Activity Limitations Related to Problem List (OT)  unable to ambulate safely;unable to transfer safely;BADLs not performed adequately or safely;IADLs not performed adequately or safely  -        Planned Therapy Interventions (OT)  adaptive equipment " training;BADL retraining;ROM/therapeutic exercise;transfer/mobility retraining;patient/caregiver education/training  -           Evaluation Complexity (OT)    Overall Complexity of Evaluation (OT)  low complexity  -           Therapy Plan Review/Discharge Plan (OT)    Anticipated Discharge Disposition (OT)  home with assist;home with home health;skilled nursing facility  -          User Key  (r) = Recorded By, (t) = Taken By, (c) = Cosigned By    Initials Name Effective Dates    Haleigh Callahan, OTR 06/16/21 -            Occupational Therapy Education                 Title: PT OT SLP Therapies (In Progress)     Topic: Occupational Therapy (In Progress)     Point: ADL training (Not Started)     Description:   Instruct learner(s) on proper safety adaptation and remediation techniques during self care or transfers.   Instruct in proper use of assistive devices.              Learner Progress:  Not documented in this visit.          Point: Body mechanics (Done)     Description:   Instruct learner(s) on proper positioning and spine alignment during self-care, functional mobility activities and/or exercises.              Learning Progress Summary           Patient Acceptance, E,TB, VU by  at 8/7/2021 1008    Comment: pt educated on adls, and safety with functional transfers                               User Key     Initials Effective Dates Name Provider Type Discipline     06/16/21 -  Haleigh Alarcon, OTR Occupational Therapist OT                  OT Recommendation and Plan  Planned Therapy Interventions (OT): adaptive equipment training, BADL retraining, ROM/therapeutic exercise, transfer/mobility retraining, patient/caregiver education/training  Therapy Frequency (OT): 5 times/wk  Plan of Care Review  Plan of Care Reviewed With: patient  Outcome Summary: OT evaluation completed. pt required min assist for supine to sit transfer. pt required CGA for sit to stand transfer and stand pivot  transfer from bed to chair with RW. pt able to scoot self back into chair with supervison. anticipate pt will require mod assist for lb adls. pt with co pain in L shoulder and chronic dizziness and nausea throughout evaluation. pt would benefit from skilled OT services to address adl retraining, ae education, and transfers. will see pt while in hospital, pt may benefit from SNF upon discharge.   Plan of Care Reviewed With: patient  Outcome Summary: OT evaluation completed. pt required min assist for supine to sit transfer. pt required CGA for sit to stand transfer and stand pivot transfer from bed to chair with RW. pt able to scoot self back into chair with supervison. anticipate pt will require mod assist for lb adls. pt with co pain in L shoulder and chronic dizziness and nausea throughout evaluation. pt would benefit from skilled OT services to address adl retraining, ae education, and transfers. will see pt while in hospital, pt may benefit from SNF upon discharge.     Outcome Measures     Row Name 08/07/21 0900             How much help from another is currently needed...    Putting on and taking off regular lower body clothing?  2  -JJ      Bathing (including washing, rinsing, and drying)  2  -JJ      Toileting (which includes using toilet bed pan or urinal)  2  -JJ      Putting on and taking off regular upper body clothing  4  -JJ      Taking care of personal grooming (such as brushing teeth)  4  -JJ      Eating meals  4  -JJ      AM-PAC 6 Clicks Score (OT)  18  -JJ         Functional Assessment    Outcome Measure Options  AM-PAC 6 Clicks Daily Activity (OT)  -        User Key  (r) = Recorded By, (t) = Taken By, (c) = Cosigned By    Initials Name Provider Type    Haleigh Callahan, OTR Occupational Therapist          Time Calculation:   Time Calculation- OT     Row Name 08/07/21 1009             Time Calculation- OT    OT Start Time  0900  -        User Key  (r) = Recorded By, (t) = Taken By, (c)  = Cosigned By    Initials Name Provider Type    Haleigh Callahan, OTR Occupational Therapist        Therapy Charges for Today     Code Description Service Date Service Provider Modifiers Qty    09429441331 HC OT EVAL LOW COMPLEXITY 1 8/7/2021 Haleigh Alarcon, ROLA GO 1               Haleigh Alarcon, ROLA  8/7/2021

## 2021-08-07 NOTE — PLAN OF CARE
"Goal Outcome Evaluation:  Plan of Care Reviewed With: patient        Progress: improving  Outcome Summary: Rested well. States she was \"exhausted\".  remains in sinus rhythm. BUN Creatnine procal up slightly. UOP moderate. unable to measure incontinence. B/P improved. Mentation at baseline.  "

## 2021-08-07 NOTE — THERAPY EVALUATION
Acute Care - Physical Therapy Initial Evaluation  VICKY Sharma     Patient Name: Joya Singh  : 1940  MRN: [id number]  Today's Date: 2021   Onset of Illness/Injury or Date of Surgery: 21  Visit Dx:     ICD-10-CM ICD-9-CM   1. Atrial flutter, unspecified type (CMS/[facility])  I48.92 427.32   2. Pneumonia of left lower lobe due to infectious organism  J18.9 486   3. JOYCE (acute kidney injury) (CMS/[facility])  N17.9 584.9     Patient Active Problem List   Diagnosis   • Arthritis   • Chronic back pain   • Chronic anemia   • Anxiety and depression   • Type 2 diabetes mellitus with neurologic complication (CMS/[facility])   • Brittle diabetes mellitus (CMS/[facility])   • Dizzy   • Hyperlipidemia   • Hypertension   • Insomnia with sleep apnea   • Obstructive sleep apnea syndrome   • Peripheral neuropathy   • Diabetic gastroparesis (CMS/[facility])   • Primary localized osteoarthrosis of left shoulder region   • Rotator cuff tendonitis   • Acquired hypothyroidism   • Anxiety   • History of biliary T-tube placement   • CKD stage 3 due to type 2 diabetes mellitus (CMS/[facility])   • Complex tear of medial meniscus of right knee as current injury   • Insufficiency fracture of tibia   • Primary osteoarthritis of left knee   • Orthostatic hypotension   • Tendinitis of right rotator cuff   • AC joint arthropathy   • Subacromial impingement of right shoulder   • Right carpal tunnel syndrome   • Anemia requiring transfusions   • Monoclonal gammopathy of unknown significance (MGUS)   • Myelodysplastic syndrome with 5q deletion (CMS/[facility])   • History of deep venous thrombosis (DVT) of distal vein of left lower extremity   • Moderate episode of recurrent major depressive disorder (CMS/[facility])   • Chronic diastolic CHF (congestive heart failure) (CMS/[facility])   • Knee pain   • Primary osteoarthritis of right knee   • Iron overload, transfusional   • Uncontrolled type 2 diabetes mellitus with hyperglycemia (CMS/[facility])   • Localized edema   • Type 2 diabetes mellitus  with diabetic neuropathy (CMS/HCC)   • Vitamin D deficiency   • Subacromial bursitis of left shoulder joint   • Gastroesophageal reflux disease with esophagitis without hemorrhage   • Esophageal dysphagia   • Anemia in neoplastic disease   • Leukocytes in urine   • Atrial flutter (CMS/HCC)   • Moderate malnutrition (CMS/HCC)     Past Medical History:   Diagnosis Date   • Allergic rhinitis    • Anemia    • Anxiety    • Appetite absent    • Arthritis    • Asthma    • Back pain    • Bell's palsy    • Black tarry stools    • Blood in stool    • Chronic fatigue    • CKD (chronic kidney disease) stage 3, GFR 30-59 ml/min (CMS/[facility])    • Community acquired pneumonia of left lung 10/25/2018   • Cough    • Depression    • Diabetes mellitus (CMS/[facility])     LAST A1C 6   • Diabetic gastroparesis (CMS/[facility]) 2/19/2016   • Difficulty walking    • Excessive urination at night    • Frequent urination    • GERD (gastroesophageal reflux disease)    • GI bleed    • Gout    • H/O blood clots     LEFT LEG 7 OR 8 YEARS AGO   • Heat intolerance    • History of fall 10/2018   • History of prior pregnancies     x8, miscarriage 5   • History of transfusion 11/2018    due to anemia   • Hyperlipidemia    • Hypertension    • Hypothyroidism    • Normal coronary arteries     by cath 2013   • Orthostatic hypotension    • AARON (obstructive sleep apnea)     DOESNT WEAR REGULARLY   • Pneumonia    • PONV (postoperative nausea and vomiting)    • Skin cancer    • Stroke (CMS/[facility])     Several mini-strokes   • TIA (transient ischemic attack)     LAST TIA JULY 2017   • Urination pain    • UTI (urinary tract infection)     Dec 2018 and Jan 2019     Past Surgical History:   Procedure Laterality Date   • BACK SURGERY      HARDWARE   • CHOLECYSTECTOMY      OPEN   • COLONOSCOPY  2011    due for repeat in 2021   • COLONOSCOPY N/A 10/28/2018    Procedure: COLONOSCOPY;  Surgeon: Emmanuel Rogers MD;  Location: [facility];  Service: Gastroenterology   •  ENDOSCOPY N/A 10/26/2018    Procedure: ESOPHAGOGASTRODUODENOSCOPY;  Surgeon: Emmanuel Rogers MD;  Location:  [facility];  Service: Gastroenterology   • ENDOSCOPY N/A 5/13/2021    Procedure: ESOPHAGOGASTRODUODENOSCOPY, biopsy, dilatation;  Surgeon: Emmanuel Rogers MD;  Location:  [facility];  Service: Gastroenterology;  Laterality: N/A;  Esophagitis; Dilation- no stricture; Biopsy- esophagus   • HYSTERECTOMY      PARTIAL    • KNEE SURGERY     • NECK SURGERY     • SPINE SURGERY     • TUMOR REMOVAL Left     Leg   • UPPER GASTROINTESTINAL ENDOSCOPY  2014    gastritis.  done by dr. hastings        PT Assessment (last 12 hours)      PT Evaluation and Treatment     Row Name 08/07/21 1000          Physical Therapy Time and Intention    Subjective Information  complains of;dizziness;pain;nausea/vomiting;weakness  -AS     Document Type  evaluation  -AS     Mode of Treatment  physical therapy  -AS     Patient Effort  good  -AS     Comment  pt with co chronic dizziness throughout evaluation, states does not improve in sitting  -AS     Row Name 08/07/21 1000          General Information    Patient Profile Reviewed  yes  -AS     Onset of Illness/Injury or Date of Surgery  08/06/21  -AS     Referring Physician  Rashmi LE  -AS     General Observations of Patient  pt supine in bed, agreed to evaluation  -AS     Equipment Currently Used at Home  wheelchair, motorized;shower chair;cane, straight;power chair, (recliner lift)  -AS     Pertinent History of Current Functional Problem  pt admitted to hospital with co SOA, generalized weakness and fatigue. pt dx with aspiration pna and new onset afib. pt states she uses motorized scooter for all mobility and sleeps in a lift chair. pt states she is I with adls.  -AS     Existing Precautions/Restrictions  fall  -AS     Risks Reviewed  patient:;increased discomfort  -AS     Benefits Reviewed  patient:;improve function;increase independence  -AS     Barriers to  "Rehab  previous functional deficit  -AS     Row Name 08/07/21 1000          Previous Level of Function/Home Environm    BADLs, Premorbid Functional Level  independent  -AS     IADLs, Premorbid Functional Level  needs assistance for safe performance  -AS     Row Name 08/07/21 1000          Living Environment    Current Living Arrangements  home/apartment/condo  -AS     Lives With  child(isabel), adult  -AS     Row Name 08/07/21 1000          Cognition    Orientation Status (Cognition)  oriented x 3  -AS     Follows Commands (Cognition)  WNL  -AS     Personal Safety Interventions  gait belt;nonskid shoes/slippers when out of bed  -AS     Row Name 08/07/21 1000          Pain Scale: Numbers Pre/Post-Treatment    Pre/Posttreatment Pain Comment  pt co chronic pain in L shoulder with mobility, did not rate. improves at rest. pt states \" I have a pinched nerve.\"  -AS     Row Name 08/07/21 1000          Strength (Manual Muscle Testing)    Strength (Manual Muscle Testing)  bilateral lower extremities limited bilateral LE weakness  -AS     Row Name 08/07/21 1000          Bed Mobility    Bed Mobility  supine-sit  -AS     Supine-Sit [place] (Bed Mobility)  minimum assist (75% patient effort)  -AS     Assistive Device (Bed Mobility)  bed rails;head of bed elevated  -AS     Comment (Bed Mobility)  pt required extended time to transfer to EOB  -AS     Row Name 08/07/21 1000          Transfers    Transfers  sit-stand transfer;stand-sit transfer;stand pivot/stand step transfer  -AS     Comment (Transfers)  pt required cues for hand placement  -AS     Sit-Stand [place] (Transfers)  contact guard;verbal cues;2 person assist  -AS     Stand-Sit [place] (Transfers)  contact guard;verbal cues;2 person assist  -AS     Row Name 08/07/21 1000          Sit-Stand Transfer    Assistive Device (Sit-Stand Transfers)  walker, front-wheeled  -AS     Row Name 08/07/21 1000          Stand-Sit Transfer    Assistive Device (Stand-Sit " Transfers)  walker, front-wheeled  -AS     Row Name 08/07/21 1000          Stand Pivot/Stand Step Transfer    Stand Pivot/Stand Step [place] (Transfers)  contact guard;verbal cues;2 person assist pt required assist to manage lines  -AS     Assistive Device (Stand Pivot Stand Step Transfer)  walker, front-wheeled  -AS     Row Name 08/07/21 1000          Balance    Comment, Balance  static standing balance CGA with RW  -AS     Row Name 08/07/21 1000          Plan of Care Review    Plan of Care Reviewed With  patient  -AS     Outcome Summary  PT evaluation completed. pt required min assist for supine to sit transfer. pt required CGA for sit to stand transfer and stand pivot transfer from bed to chair with RW. pt able to scoot self back into chair with supervison. pt with co pain in L shoulder and chronic dizziness and nausea throughout evaluation. pt would benefit from skilled PT services to address gait, balance, strength, and transfers. will see pt while in hospital, pt may benefit from SNF upon discharge.   -AS     Row Name 08/07/21 1000          Positioning and Restraints    Pre-Treatment Position  in bed  -AS     Post Treatment Position  chair  -AS     In Chair  reclined;call light within reach;notified nsg;encouraged to call for assist  -AS       User Key  (r) = Recorded By, (t) = Taken By, (c) = Cosigned By    Initials Name Provider Type    AS Yasmani Wilder, PT Physical Therapist        Physical Therapy Education                 Title: PT OT SLP Therapies (In Progress)     Topic: Physical Therapy (Done)     Point: Mobility training (Done)     Learning Progress Summary           Patient Acceptance, E,TB, VU,NR by AS at 8/7/2021 1045                               User Key     Initials Effective Dates Name Provider Type Discipline    AS 06/16/21 -  Yasmani Wilder, PT Physical Therapist PT              PT Recommendation and Plan     Plan of Care Reviewed With: patient  Outcome Summary: PT  evaluation completed. pt required min assist for supine to sit transfer. pt required CGA for sit to stand transfer and stand pivot transfer from bed to chair with RW. pt able to scoot self back into chair with supervison. pt with co pain in L shoulder and chronic dizziness and nausea throughout evaluation. pt would benefit from skilled PT services to address gait, balance, strength, and transfers. will see pt while in hospital, pt may benefit from SNF upon discharge.   Outcome Measures     Row Name 08/07/21 1000 08/07/21 0900          How much help from another person do you currently need...    Turning from your back to your side while in flat bed without using bedrails?  3  -AS  --     Moving from lying on back to sitting on the side of a flat bed without bedrails?  3  -AS  --     Moving to and from a bed to a chair (including a wheelchair)?  2  -AS  --     Standing up from a chair using your arms (e.g., wheelchair, bedside chair)?  3  -AS  --     Climbing 3-5 steps with a railing?  2  -AS  --     To walk in hospital room?  2  -AS  --     AM-PAC 6 Clicks Score (PT)  15  -AS  --        How much help from another is currently needed...    Putting on and taking off regular lower body clothing?  --  2  -JJ     Bathing (including washing, rinsing, and drying)  --  2  -JJ     Toileting (which includes using toilet bed pan or urinal)  --  2  -JJ     Putting on and taking off regular upper body clothing  --  4  -JJ     Taking care of personal grooming (such as brushing teeth)  --  4  -JJ     Eating meals  --  4  -JJ     AM-PAC 6 Clicks Score (OT)  --  18  -JJ        Functional Assessment    Outcome Measure Options  AM-PAC 6 Clicks Basic Mobility (PT)  -AS  AM-PAC 6 Clicks Daily Activity (OT)  -JJ       User Key  (r) = Recorded By, (t) = Taken By, (c) = Cosigned By    Initials Name Provider Type    Haleigh Callahan, OTR Occupational Therapist    AS Yasmani Wilder, PT Physical Therapist           Time  Calculation:   PT Charges     Row Name 08/07/21 1047             Time Calculation    Start Time  0930  -AS        User Key  (r) = Recorded By, (t) = Taken By, (c) = Cosigned By    Initials Name Provider Type    AS Yasmani Wilder, PT Physical Therapist        Therapy Charges for Today     Code Description Service Date Service Provider Modifiers Qty    [id number] HC PT EVAL LOW COMPLEXITY 1 8/7/2021 Yasmani Wilder, PT GP 1          PT G-Codes  Outcome Measure Options: AM-PAC 6 Clicks Basic Mobility (PT)  AM-PAC 6 Clicks Score (PT): 15  AM-PAC 6 Clicks Score (OT): 18    Yasmani Wilder, CHET  8/7/2021

## 2021-08-07 NOTE — CONSULTS
Group: [facility]         CONSULT NOTE    Patient Identification:  oJya Singh  81 y.o.  female  1940  [id number]            Requesting physician: Morelia Olivera, nurse practitioner    Reason for Consultation: Recurrent pneumonia    CC: Shortness of breath    History of Present Illness:  81-year-old  female who has shortness of breath.  She says it is chronic but it has been worse over the past several days.  Associated with a cough but the cough is nonproductive.  She denies fever, chills or hemoptysis.  She recently saw her primary care physician, Dr. Mercado several times due to recurrent pneumonia.  Apparently she has been on numerous antibiotics including azithromycin, cefdinir and moxifloxacin since June.  She uses oxygen at home during the day.  She has been dizzy and lightheaded.  She also complains of generalized fatigue and malaise.  Old medical records reviewed.  She has a history of aspiration pneumonia, myelodysplastic syndrome with recent transfusion August 4.  She has chronic kidney disease, chronic diastolic heart failure, history of DVT on Eliquis, hypertension, diabetes, diabetic retinopathy, recurrent urinary tract infections, esophageal stricture, depression, chronic pain, diabetic gastroparesis, orthostatic hypotension, hypothyroidism and history of TIA.      Review of Systems   Constitutional: Positive for fatigue. Negative for diaphoresis and fever.   HENT: Negative for ear discharge and sore throat.    Eyes: Negative for pain and visual disturbance.   Respiratory: Positive for cough and shortness of breath.    Cardiovascular: Positive for leg swelling. Negative for chest pain.   Gastrointestinal: Negative for abdominal pain and diarrhea.   Endocrine: Negative for cold intolerance and polyuria.   Genitourinary: Negative for dysuria and hematuria.   Musculoskeletal: Positive for myalgias. Negative for joint swelling.   Skin: Negative for rash and wound.   Neurological:  Positive for dizziness and light-headedness. Negative for speech difficulty and numbness.   Hematological: Negative for adenopathy. Does not bruise/bleed easily.   Psychiatric/Behavioral: Negative for agitation and confusion.       Past Medical History:  Past Medical History:   Diagnosis Date   • Allergic rhinitis    • Anemia    • Anxiety    • Appetite absent    • Arthritis    • Asthma    • Back pain    • Bell's palsy    • Black tarry stools    • Blood in stool    • Chronic fatigue    • CKD (chronic kidney disease) stage 3, GFR 30-59 ml/min (CMS/[facility])    • Community acquired pneumonia of left lung 10/25/2018   • Cough    • Depression    • Diabetes mellitus (CMS/[facility])     LAST A1C 6   • Diabetic gastroparesis (CMS/[facility]) 2/19/2016   • Difficulty walking    • Excessive urination at night    • Frequent urination    • GERD (gastroesophageal reflux disease)    • GI bleed    • Gout    • H/O blood clots     LEFT LEG 7 OR 8 YEARS AGO   • Heat intolerance    • History of fall 10/2018   • History of prior pregnancies     x8, miscarriage 5   • History of transfusion 11/2018    due to anemia   • Hyperlipidemia    • Hypertension    • Hypothyroidism    • Normal coronary arteries     by cath 2013   • Orthostatic hypotension    • AARON (obstructive sleep apnea)     DOESNT WEAR REGULARLY   • Pneumonia    • PONV (postoperative nausea and vomiting)    • Skin cancer    • Stroke (CMS/[facility])     Several mini-strokes   • TIA (transient ischemic attack)     LAST TIA JULY 2017   • Urination pain    • UTI (urinary tract infection)     Dec 2018 and Jan 2019       Past Surgical History:  Past Surgical History:   Procedure Laterality Date   • BACK SURGERY      HARDWARE   • CHOLECYSTECTOMY      OPEN   • COLONOSCOPY  2011    due for repeat in 2021   • COLONOSCOPY N/A 10/28/2018    Procedure: COLONOSCOPY;  Surgeon: Emmanuel Rogers MD;  Location: [facility];  Service: Gastroenterology   • ENDOSCOPY N/A 10/26/2018    Procedure:  ESOPHAGOGASTRODUODENOSCOPY;  Surgeon: Emmanuel Rogers MD;  Location:  [facility];  Service: Gastroenterology   • ENDOSCOPY N/A 5/13/2021    Procedure: ESOPHAGOGASTRODUODENOSCOPY, biopsy, dilatation;  Surgeon: Emmanuel Rogers MD;  Location:  [facility];  Service: Gastroenterology;  Laterality: N/A;  Esophagitis; Dilation- no stricture; Biopsy- esophagus   • HYSTERECTOMY      PARTIAL    • KNEE SURGERY     • NECK SURGERY     • SPINE SURGERY     • TUMOR REMOVAL Left     Leg   • UPPER GASTROINTESTINAL ENDOSCOPY  2014    gastritis.  done by dr. hastings        Home Meds:  Medications Prior to Admission   Medication Sig Dispense Refill Last Dose   • ACCU-CHEK FASTCLIX LANCETS misc TEST 3-4 TIMES DAILY AS DIRECTED 400 each 3    • ACCU-CHEK SMARTVIEW test strip TEST BLOOD SUGAR THREE TIMES DAILY OR AS DIRECTED 300 each 3    • acetaminophen (TYLENOL) 325 MG tablet Take 2 tablets by mouth Every 6 (Six) Hours As Needed for Mild Pain . OTC product 40 tablet 0    • albuterol (PROVENTIL) (2.5 MG/3ML) 0.083% nebulizer solution Take 2.5 mg by nebulization Every 4 (Four) Hours As Needed for Wheezing. 100 each 2    • apixaban (Eliquis) 5 MG tablet tablet Take 1 tablet by mouth 2 (Two) Times a Day. 180 tablet 1 8/5/2021 at Unknown time   • atorvastatin (LIPITOR) 10 MG tablet TAKE ONE TABLET BY MOUTH AT BEDTIME 90 tablet 1 8/5/2021 at Unknown time   • azithromycin (Zithromax) 250 MG tablet Take 2 tablets the first day, then 1 tablet daily for 4 days. 6 tablet 0 8/5/2021 at Unknown time   • Blood Glucose Monitoring Suppl (ACCU-CHEK KENNETH SMARTVIEW) w/Device kit TEST blood sugar three times daily or as directed 1 kit 0    • cefdinir (OMNICEF) 300 MG capsule Take 1 capsule by mouth 2 (Two) Times a Day for 10 days. 20 capsule 0 8/5/2021 at Unknown time   • cyclobenzaprine (FLEXERIL) 10 MG tablet TAKE ONE TABLET BY MOUTH THREE TIMES DAILY as needed for muscle spasms 45 tablet 0 8/5/2021 at Unknown time   • desvenlafaxine  "(PRISTIQ) 50 MG 24 hr tablet TAKE ONE TABLET BY MOUTH EVERY DAY 90 tablet 1 8/5/2021 at Unknown time   • Diclofenac Sodium (Voltaren) 1 % gel gel Apply 4 g topically to the appropriate area as directed 4 (Four) Times a Day As Needed (shoulder pain). 400 g 2    • dicyclomine (Bentyl) 10 MG capsule Take 1 cap po q 8 hours prn spasm 60 capsule 0 8/5/2021 at Unknown time   • famotidine (PEPCID) 40 MG tablet Take 1 tablet by mouth 2 (Two) Times a Day. With lunch and at bedtime 180 tablet 3 8/5/2021 at Unknown time   • furosemide (LASIX) 20 MG tablet TAKE ONE TABLET BY MOUTH EVERY DAY Take ONE additional pill daily as needed for swelling 180 tablet 1 8/5/2021 at Unknown time   • gabapentin (NEURONTIN) 400 MG capsule TAKE ONE CAPSULE BY MOUTH EVERY MORNING, AT NOON, AND TWO AT BEDTIME 120 capsule 2 8/5/2021 at Unknown time   • HYDROcodone-acetaminophen (NORCO) 5-325 MG per tablet TAKE ONE TABLET BY MOUTH every 12 hours AS NEEDED FOR SEVERE PAIN 30 tablet 0 8/5/2021 at Unknown time   • insulin aspart (novoLOG) 100 UNIT/ML injection Inject 5 Units under the skin into the appropriate area as directed 3 (Three) Times a Day With Meals.  12 8/5/2021 at Unknown time   • levothyroxine (SYNTHROID, LEVOTHROID) 88 MCG tablet TAKE ONE TABLET BY MOUTH EVERY DAY 90 tablet 1 8/5/2021 at Unknown time   • loratadine (Claritin) 10 MG tablet Take 10 mg by mouth Daily.   8/5/2021 at Unknown time   • midodrine (PROAMATINE) 2.5 MG tablet TAKE ONE TABLET BY MOUTH THREE TIMES DAILY 270 tablet 3 8/5/2021 at Unknown time   • Mirabegron ER (Myrbetriq) 25 MG tablet sustained-release 24 hour 24 hr tablet Take 1 tablet by mouth Daily. 30 tablet 5 8/5/2021 at Unknown time   • Nebulizer device 1 each Take As Directed. 1 each 0    • Needle, Disp, (BD DISP NEEDLES) 30G X 1/2\" misc To be used 3 times daily with Novolog Flexpen. 100 each 5    • nitrofurantoin, macrocrystal-monohydrate, (MACROBID) 100 MG capsule Take 100 mg by mouth Daily.   8/5/2021 at " Unknown time   • nitrofurantoin, macrocrystal-monohydrate, (MACROBID) 100 MG capsule Take 100 mg by mouth 2 (Two) Times a Day.   8/5/2021 at Unknown time   • O2 (OXYGEN) Inhale 2 L/min Every Night. Uses 2L  overnight only   8/6/2021 at Unknown time   • omeprazole (priLOSEC) 40 MG capsule Take 1 capsule by mouth 2 (two) times a day. 180 capsule 3 8/5/2021 at Unknown time   • ondansetron (ZOFRAN) 8 MG tablet Take 1 tablet by mouth 3 (Three) Times a Day As Needed for Nausea or Vomiting. 30 tablet 5    • phenazopyridine (PYRIDIUM) 200 MG tablet Take 200 mg by mouth 3 (Three) Times a Day.   8/5/2021 at Unknown time   • potassium chloride 10 MEQ CR tablet TAKE ONE TABLET BY MOUTH EVERY DAY 90 tablet 2 8/5/2021 at Unknown time   • sennosides-docusate (senna-docusate sodium) 8.6-50 MG per tablet Take 2 tablets by mouth Daily. 60 tablet 3 8/5/2021 at Unknown time   • Tresiba FlexTouch 200 UNIT/ML solution pen-injector pen injection INJECT 54 UNITS subcutaneously AT BEDTIME 9 mL 11 8/5/2021 at Unknown time   • UltiCare Mini Pen Needles 31G X 6 MM misc USE TO inject insulins FOUR TIMES DAILY AS DIRECTED 400 each 3 8/5/2021 at Unknown time   • Ventolin  (90 Base) MCG/ACT inhaler INHALE TWO PUFFS BY MOUTH EVERY 4 HOURS AS NEEDED FOR WHEEZING 18 g 3 8/5/2021 at Unknown time   • nystatin (MYCOSTATIN) 865270 UNIT/GM cream Apply  topically to the appropriate area as directed 2 (two) times a day. 30 g 0 More than a month at Unknown time       Allergies:  Allergies   Allergen Reactions   • Baclofen Anxiety     Panic attack, nightmares   • Codeine Itching and Rash   • Lisinopril Cough   • Morphine Hives   • Penicillins Rash     Tolerates cephalosporins        Social History:   Social History     Socioeconomic History   • Marital status:      Spouse name: Not on file   • Number of children: 3   • Years of education: High School   • Highest education level: Not on file   Tobacco Use   • Smoking status: Never Smoker   •  "Smokeless tobacco: Never Used   • Tobacco comment: CAFFEINE USE: NONE   Vaping Use   • Vaping Use: Never used   Substance and Sexual Activity   • Alcohol use: No   • Drug use: No   • Sexual activity: Defer     Comment: EXERCISE - RARELY       Family History:  Family History   Problem Relation Age of Onset   • Lupus Mother    • Heart failure Mother 59   • Heart disease Other    • Hypertension Other    • Heart attack Father    • Breast cancer Neg Hx        Physical Exam:  /50   Pulse 71   Temp 98.2 °F (36.8 °C) (Oral)   Resp 18   Ht 154.9 cm (61\")   Wt 92.5 kg (204 lb)   SpO2 94%   BMI 38.55 kg/m²  Body mass index is 38.55 kg/m². 94% 92.5 kg (204 lb)  Physical Exam  HENT:      Right Ear: External ear normal.      Left Ear: External ear normal.      Nose: Nose normal.   Eyes:      Conjunctiva/sclera: Conjunctivae normal.      Pupils: Pupils are equal, round, and reactive to light.   Neck:      Thyroid: No thyromegaly.      Vascular: No JVD.      Trachea: No tracheal deviation.   Cardiovascular:      Rate and Rhythm: Normal rate and regular rhythm.      Heart sounds: Normal heart sounds. No murmur heard.     Pulmonary:      Effort: Pulmonary effort is normal.      Breath sounds: Normal breath sounds.      Comments: Few coarse breath sounds bilaterally but no wheezing.  No use of accessory muscles  Abdominal:      Palpations: Abdomen is soft.      Tenderness: There is no abdominal tenderness. There is no rebound.      Comments: Cannot palpate liver or spleen enlargement   Musculoskeletal:         General: No deformity. Normal range of motion.      Cervical back: Neck supple. No rigidity.   Skin:     General: Skin is warm.      Findings: No rash.      Comments: No palpable nodules   Neurological:      General: No focal deficit present.      Mental Status: She is alert and oriented to person, place, and time.      Cranial Nerves: No cranial nerve deficit.      Motor: No weakness.   Psychiatric:         Mood " and Affect: Mood normal.         Thought Content: Thought content normal.         LABS:  COVID19   Date Value Ref Range Status   08/06/2021 Not Detected Not Detected - Ref. Range Final       Lab Results   Component Value Date    CALCIUM 8.6 08/07/2021    PHOS 4.7 (H) 07/30/2020     Results from last 7 days   Lab Units 08/07/21  0509 08/06/21  0603 08/06/21  0603 08/04/21  1013   MAGNESIUM mg/dL  --   --  2.3  --    SODIUM mmol/L 137  --  136  --    POTASSIUM mmol/L 4.9  --  4.9  --    CHLORIDE mmol/L 105  --  102  --    CO2 mmol/L 26.2  --  26.2  --    BUN mg/dL 52*  --  48*  --    CREATININE mg/dL 3.18*  --  2.68*  --    GLUCOSE mg/dL 114*   < > 96  --    CALCIUM mg/dL 8.6  --  9.1  --    WBC 10*3/mm3 5.72  --  8.29 6.43   HEMOGLOBIN g/dL 9.3*  --  11.3* 11.7*   PLATELETS 10*3/mm3 235  --  290 269   ALT (SGPT) U/L  --   --  16  --    AST (SGOT) U/L  --   --  18  --    PROBNP pg/mL  --   --  3,842.0*  --    PROCALCITONIN ng/mL 1.04*  --  0.71*  --     < > = values in this interval not displayed.     Lab Results   Component Value Date    TROPONINT <0.010 08/06/2021     Results from last 7 days   Lab Units 08/06/21  0603   TROPONIN T ng/mL <0.010     Results from last 7 days   Lab Units 08/06/21  0740 08/06/21  0603   BLOODCX  No growth at 24 hours No growth at 24 hours     Results from last 7 days   Lab Units 08/07/21  0509 08/06/21  0603   PROCALCITONIN ng/mL 1.04* 0.71*   LACTATE mmol/L  --  1.2         Results from last 7 days   Lab Units 08/06/21  1028   ADENOVIRUS DETECTION BY PCR  Not Detected   CORONAVIRUS 229E  Not Detected   CORONAVIRUS HKU1  Not Detected   CORONAVIRUS NL63  Not Detected   CORONAVIRUS OC43  Not Detected   HUMAN METAPNEUMOVIRUS  Not Detected   HUMAN RHINOVIRUS/ENTEROVIRUS  Not Detected   INFLUENZA B PCR  Not Detected   PARAINFLUENZA 1  Not Detected   PARAINFLUENZA VIRUS 2  Not Detected   PARAINFLUENZA VIRUS 3  Not Detected   PARAINFLUENZA VIRUS 4  Not Detected   BORDETELLA PERTUSSIS PCR  Not  Detected   BORDETELLA PARAPERTUSSIS PCR  Not Detected   CHLAMYDOPHILA PNEUMONIAE PCR  Not Detected   MYCOPLAMA PNEUMO PCR  Not Detected   RSV, PCR  Not Detected     Results from last 7 days   Lab Units 08/06/21  0603   INR  1.58*     Results from last 7 days   Lab Units 08/06/21  0740 08/06/21  0603   BLOODCX  No growth at 24 hours No growth at 24 hours     Lab Results   Component Value Date    TSH 7.190 (H) 08/06/2021     Estimated Creatinine Clearance: 14.9 mL/min (A) (by C-G formula based on SCr of 3.18 mg/dL (H)).         Imaging: I personally visualized the images of several chest x-rays obtained over the past 60 days showing persistent opacities bilaterally.      Assessment:  Persistent pulmonary infiltrates  Atrial flutter  Hypoxemia  History of dysphagia  History of esophageal stricture        Recommendations:  Combination of Ditropan, Scopolamine and Neurontin can cause excessive sedation or even delirium in this elderly patient who has a history of esophageal stricture, dysphagia and possible recurrent aspiration pneumonia.  I will discontinue some of these medications.  We should minimize sedatives or medications that can cause delirium.  I recommend CT scan of the chest to better evaluate infiltrates.  May need bronchoscopy for respiratory cultures.  Continue cefepime empirically for now.  Observe WBC trend, temperature curve and procalcitonin levels.  Obtain speech evaluation, consider repeat barium esophagram or video swallow study.  Increase mobility out of bed.  Use bronchodilators if needed for wheezing.  Send sputum, if available, for bacterial culture.  Perform PCR test for COVID-19.  Not sure she requires ICU status.  May stop Lasix now since she has already received some IV.  Repeat proBNP.  Continue supplemental oxygen to maintain saturations above 88%.  We will follow along with you.    Patient lifelong non-smoker..    Darío Rodriguez MD  8/7/2021  12:08 EDT      Much of this encounter note is  an electronic transcription/translation of spoken language to printed text using Dragon Software.

## 2021-08-07 NOTE — PROGRESS NOTES
Chart reviewed, patient is in NSR. Renal function worsening after diuresis; it's been stopped.  She's on amiodarone.      No new cardiac recommendations today.

## 2021-08-07 NOTE — PROGRESS NOTES
"SERVICE: [facility] HOSPITALIST    CONSULTANTS: Cardiology, pulmonology    CHIEF COMPLAINT: Shortness of breath    SUBJECTIVE: Patient seen and examined at bedside.  She reports that she has been having chills overnight with occasional nausea but has not vomited.  She continues to saturate well on 1 L nasal cannula.  She does report shortness of breath wheezing and dry cough.  She reports feeling tired.  Her heart rate has been well controlled overnight, she denies palpitations or chest pain.  Denies any other acute complaints at this time.    OBJECTIVE:    /52   Pulse 70   Temp 98.9 °F (37.2 °C) (Oral)   Resp 18   Ht 154.9 cm (61\")   Wt 92.5 kg (204 lb)   SpO2 94%   BMI 38.55 kg/m²     MEDS/LABS REVIEWED AND ORDERED    amiodarone, 200 mg, Oral, Q12H  atorvastatin, 10 mg, Oral, Daily  cefepime, 2 g, Intravenous, Q8H  desvenlafaxine, 50 mg, Oral, Daily  enoxaparin, 30 mg, Subcutaneous, Q24H  famotidine, 40 mg, Oral, BID  gabapentin, 100 mg, Oral, QAM  gabapentin, 200 mg, Oral, Nightly  guaiFENesin, 1,200 mg, Oral, BID  insulin aspart, 0-7 Units, Subcutaneous, TID AC  insulin detemir, 20 Units, Subcutaneous, Nightly  levothyroxine, 88 mcg, Oral, Daily  midodrine, 2.5 mg, Oral, TID AC  O2, 2 L/min, Inhalation, Nightly  oxybutynin XL, 5 mg, Oral, Daily  pantoprazole, 40 mg, Oral, QAM  sennosides-docusate, 2 tablet, Oral, Daily  sodium chloride, 10 mL, Intravenous, Q12H        Physical Exam  Constitutional:       General: She is not in acute distress.     Appearance: She is not toxic-appearing or diaphoretic.   HENT:      Head: Normocephalic and atraumatic.      Nose: Nose normal.      Mouth/Throat:      Mouth: Mucous membranes are moist.      Pharynx: Oropharynx is clear.   Eyes:      Extraocular Movements: Extraocular movements intact.      Conjunctiva/sclera: Conjunctivae normal.   Cardiovascular:      Rate and Rhythm: Normal rate and regular rhythm.      Pulses: Normal pulses.      " Heart sounds: Normal heart sounds.   Pulmonary:      Effort: Pulmonary effort is normal. No respiratory distress.      Breath sounds: Rales present. No wheezing or rhonchi.   Abdominal:      General: Abdomen is flat. Bowel sounds are normal.      Palpations: Abdomen is soft.   Musculoskeletal:         General: No swelling or deformity. Normal range of motion.      Cervical back: Normal range of motion. No rigidity.   Skin:     General: Skin is warm and dry.      Coloration: Skin is not jaundiced.   Neurological:      General: No focal deficit present.      Mental Status: She is alert and oriented to person, place, and time. Mental status is at baseline.   Psychiatric:         Mood and Affect: Mood normal.         Behavior: Behavior normal.         Thought Content: Thought content normal.         Judgment: Judgment normal.         LAB/DIAGNOSTICS:    Lab Results (last 24 hours)     Procedure Component Value Units Date/Time    Procalcitonin [[id number]]  (Abnormal) Collected: 08/07/21 0509    Specimen: Blood Updated: 08/07/21 0546     Procalcitonin 1.04 ng/mL     Narrative:      Results may be falsely decreased if patient taking Biotin.     Basic Metabolic Panel [[id number]]  (Abnormal) Collected: 08/07/21 0509    Specimen: Blood Updated: 08/07/21 0545     Glucose 114 mg/dL      BUN 52 mg/dL      Creatinine 3.18 mg/dL      Sodium 137 mmol/L      Potassium 4.9 mmol/L      Chloride 105 mmol/L      CO2 26.2 mmol/L      Calcium 8.6 mg/dL      eGFR   Amer --     Comment: <15 Indicative of kidney failure.        eGFR Non African Amer 14 mL/min/1.73      Comment: <15 Indicative of kidney failure.        BUN/Creatinine Ratio 16.4     Anion Gap 5.8 mmol/L     Narrative:      GFR Normal >60  Chronic Kidney Disease <60  Kidney Failure <15      CBC & Differential [[id number]]  (Abnormal) Collected: 08/07/21 0509    Specimen: Blood Updated: 08/07/21 0525    Narrative:      The following orders were created for panel order CBC  & Differential.  Procedure                               Abnormality         Status                     ---------                               -----------         ------                     CBC Auto Differential[[id number]]        Abnormal            Final result                 Please view results for these tests on the individual orders.    CBC Auto Differential [[id number]]  (Abnormal) Collected: 08/07/21 0509    Specimen: Blood Updated: 08/07/21 0525     WBC 5.72 10*3/mm3      RBC 3.15 10*6/mm3      Hemoglobin 9.3 g/dL      Hematocrit 30.7 %      MCV 97.5 fL      MCH 29.5 pg      MCHC 30.3 g/dL      RDW 22.1 %      RDW-SD 76.6 fl      MPV 12.4 fL      Platelets 235 10*3/mm3      Neutrophil % 70.3 %      Lymphocyte % 13.8 %      Monocyte % 4.0 %      Eosinophil % 9.3 %      Basophil % 1.4 %      Immature Grans % 1.2 %      Neutrophils, Absolute 4.02 10*3/mm3      Lymphocytes, Absolute 0.79 10*3/mm3      Monocytes, Absolute 0.23 10*3/mm3      Eosinophils, Absolute 0.53 10*3/mm3      Basophils, Absolute 0.08 10*3/mm3      Immature Grans, Absolute 0.07 10*3/mm3      nRBC 0.0 /100 WBC     Vitamin D 25 Hydroxy [[id number]] Collected: 08/07/21 0509    Specimen: Blood Updated: 08/07/21 0516    Respiratory Panel, PCR (WITHOUT COVID) - Swab, Nasopharynx [[id number]]  (Normal) Collected: 08/06/21 1028    Specimen: Swab from Nasopharynx Updated: 08/06/21 1705     ADENOVIRUS, PCR Not Detected     Coronavirus 229E Not Detected     Coronavirus HKU1 Not Detected     Coronavirus NL63 Not Detected     Coronavirus OC43 Not Detected     Human Metapneumovirus Not Detected     Human Rhinovirus/Enterovirus Not Detected     Influenza B PCR Not Detected     Parainfluenza Virus 1 Not Detected     Parainfluenza Virus 2 Not Detected     Parainfluenza Virus 3 Not Detected     Parainfluenza Virus 4 Not Detected     Bordetella pertussis pcr Not Detected     Chlamydophila pneumoniae PCR Not Detected     Mycoplasma pneumo by PCR Not Detected      Influenza A PCR Not Detected     RSV, PCR Not Detected     Bordetella parapertussis PCR Not Detected    Narrative:      The coronavirus on the RVP is NOT COVID-19 and is NOT indicative of infection with COVID-19.    In the setting of a positive respiratory panel with a viral infection PLUS a negative procalcitonin without other underlying concern for bacterial infection, consider observing off antibiotics or discontinuation of antibiotics and continue supportive care. If the respiratory panel is positive for atypical bacterial infection (Bordetella pertussis, Chlamydophila pneumoniae, or Mycoplasma pneumoniae), consider antibiotic de-escalation to target atypical bacterial infection.    POC Glucose Once [[id number]]  (Normal) Collected: 08/06/21 1648    Specimen: Blood Updated: 08/06/21 1704     Glucose 100 mg/dL      Comment: Meter: [id number] : [id number] Samuel Miller CNA       MRSA Screen Culture (Outpatient) - Swab, Nares [[id number]] Collected: 08/06/21 1427    Specimen: Swab from Nares Updated: 08/06/21 1432    POC Glucose Once [[id number]]  (Normal) Collected: 08/06/21 1140    Specimen: Blood Updated: 08/06/21 1203     Glucose 105 mg/dL      Comment: Meter: [id number] : [id number] Ynes Rosenthal NURSING ASSISTANT           ECG 12 Lead   Final Result   HEART RATE= 66  bpm   RR Interval= 904  ms   NE Interval= 178  ms   P Horizontal Axis= 7  deg   P Front Axis= 59  deg   QRSD Interval= 149  ms   QT Interval= 420  ms   QRS Axis= -64  deg   T Wave Axis= 87  deg   - ABNORMAL ECG -   Sinus rhythm   RBBB and LAFB   c/w prior ecg, SR is now seen   Electronically Signed By: Neetu Chapin ([facility]) 06-Aug-2021 14:47:25   Date and Time of Study: 2021-08-06 13:39:31      ECG 12 Lead   Final Result   HEART RATE= 128  bpm   RR Interval= 479  ms   NE Interval=   ms   P Horizontal Axis=   deg   P Front Axis=   deg   QRSD Interval= 152  ms   QT Interval= 382  ms   QRS Axis= 4  deg   T Wave Axis= 35  deg   - ABNORMAL ECG  -   Atrial flutter with predominant 2:1 AV block   Right bundle branch block   Inferior infarct, old   No change from prior tracing   Electronically Signed By: Neetu Chapin ([facility] 06-Aug-2021 14:47:37   Date and Time of Study: 2021-08-06 10:01:18      ECG 12 Lead   Final Result   HEART RATE= 133  bpm   RR Interval= 452  ms   OH Interval=   ms   P Horizontal Axis=   deg   P Front Axis=   deg   QRSD Interval= 142  ms   QT Interval= 330  ms   QRS Axis= -67  deg   T Wave Axis= 9  deg   - ABNORMAL ECG -   Atrial flutter with predominant 2:1 AV block   Right bundle branch block   c/w prior ecg, atrial flutter is now seen   Electronically Signed By: Neetu Chapin ([facility] 06-Aug-2021 14:47:59   Date and Time of Study: 2021-08-06 06:00:40      ECG 12 Lead    (Results Pending)     Results for orders placed during the hospital encounter of 08/06/21    Adult Transthoracic Echo Complete W/ Cont if Necessary Per Protocol    Interpretation Summary  · Left ventricular wall thickness is consistent with mild to moderate septal asymmetric hypertrophy.  · Calculated left ventricular EF = 63.8% Estimated left ventricular EF was in agreement with the calculated left ventricular EF. Left ventricular systolic function is normal.  · Mildly reduced right ventricular systolic function noted.    XR Chest 1 View    Result Date: 8/6/2021  1.  Masslike opacity or consolidation of the medial left lung base behind the heart, unchanged since 06/30/2021. Consider chest CT, preferably with IV contrast, for further characterization. 2.  Increasing patchy infiltrates in both lung bases.  This report was finalized on 8/6/2021 7:28 AM by Dr. Jacques Bates MD.          ASSESSMENT/PLAN:  AHR F:  Acute/recurrent aspiration pneumonia: Pulmonary consulted  Consult SLP to evaluate swallow, awaiting video swallow.   Aspiration precautions, head of bed elevated  MBSS ordered due to history of aspiration and report of choking  Trend procalcitonin   RVP  negative. sputum culture pending MRSA swab pending  Add albuterol as needed, Mucinex twice daily, Acapella  Received Levaquin in ER, change to cefepime. WBC improved.      New onset atrial fibrillation/flutter: Cardiology following  Chronic diastolic heart failure:  Hypertension with history of orthostatic hypotension:  Hyperlipidemia:  They have initiated amiodarone and Digoxin, heart rate well controlled overnight  BP at/low goal, check Dig level in AM. allow home midodrine  Echo shows moderate septal asymmetric hypertrophy, EF calculated at 63.8%.  Mildly reduced right ventricular systolic function.  Strict I&O, daily weight  Monitor in ICU     JOYCE on CKD stage IV:  Creatinine currently 2.68, baseline approximately 2.2  Patient received IVF, creatinine continues to worsen. Will assess need for IV lasix due to diastolic heart failure as per above.    Nausea: continue zofran PRN. Added scopolamine patch.       Transfusion dependent myelodysplastic syndrome: Recent transfusion, hemoglobin currently 9.3. Monitor closely. No evidence of bleeding/blood loss.      DM2 in obese with diabetic gastroparesis and retinopathy: A1C 6.1%  Body mass index is 38.58 kg/m².   Add partial dose Levemir, substitute for home Tresiba  Add low-dose sliding scale insulin with Accu-Cheks AC/at bedtime     Decreased appetite/weight loss, r/o malnutrition: consult dietician     History of esophageal stricture: nothing acute currently, but as above, MBSS ordered due to history and suspected aspiration     History of DVT on Eliquis: Changed to therapeutic dose Lovenox for now in case of intervention     Chronic Bell's palsy: Chronic right facial droop     Gait instability: consult PT/OT  Falls precautions     H/O recurrent UTIs:  H/O OAB:   Check cath UA C&S     Depression: No current acute issues, add home Pristiq     Hypothyroidism: TSH elevated, but given unknown amount of time in A-fib, recommend repeat TFTs outpatient after resolution to  "determine need to increase dosing         PLAN FOR DISPOSITION: PHU Alvarado DO  Hospitalist, [facility]  08/07/21  06:53 EDT    \"Dictated utilizing Dragon dictation\"  "

## 2021-08-07 NOTE — THERAPY EVALUATION
Acute Care - Speech Language Pathology   Swallow Initial Evaluation VICKY Sharma     Patient Name: Joya Singh  : 1940  MRN: [id number]  Today's Date: 2021  Onset of Illness/Injury or Date of Surgery: 21     Referring Physician: Rashmi LE      Admit Date: 2021    Visit Dx:     ICD-10-CM ICD-9-CM   1. Atrial flutter, unspecified type (CMS/Formerly [facility])  I48.92 427.32   2. Pneumonia of left lower lobe due to infectious organism  J18.9 486   3. JOYCE (acute kidney injury) (CMS/Formerly [facility])  N17.9 584.9     Patient Active Problem List   Diagnosis   • Arthritis   • Chronic back pain   • Chronic anemia   • Anxiety and depression   • Type 2 diabetes mellitus with neurologic complication (CMS/[facility])   • Brittle diabetes mellitus (CMS/[facility]ly [facility])   • Dizzy   • Hyperlipidemia   • Hypertension   • Insomnia with sleep apnea   • Obstructive sleep apnea syndrome   • Peripheral neuropathy   • Diabetic gastroparesis (CMS/[facility])   • Primary localized osteoarthrosis of left shoulder region   • Rotator cuff tendonitis   • Acquired hypothyroidism   • Anxiety   • History of biliary T-tube placement   • CKD stage 3 due to type 2 diabetes mellitus (CMS/[facility])   • Complex tear of medial meniscus of right knee as current injury   • Insufficiency fracture of tibia   • Primary osteoarthritis of left knee   • Orthostatic hypotension   • Tendinitis of right rotator cuff   • AC joint arthropathy   • Subacromial impingement of right shoulder   • Right carpal tunnel syndrome   • Anemia requiring transfusions   • Monoclonal gammopathy of unknown significance (MGUS)   • Myelodysplastic syndrome with 5q deletion (CMS/[facility])   • History of deep venous thrombosis (DVT) of distal vein of left lower extremity   • Moderate episode of recurrent major depressive disorder (CMS/[facility])   • Chronic diastolic CHF (congestive heart failure) (CMS/[facility])   • Knee pain   • Primary osteoarthritis of right knee   • Iron overload, transfusional   • Uncontrolled type 2  diabetes mellitus with hyperglycemia (CMS/[facility])   • Localized edema   • Type 2 diabetes mellitus with diabetic neuropathy (CMS/[facility])   • Vitamin D deficiency   • Subacromial bursitis of left shoulder joint   • Gastroesophageal reflux disease with esophagitis without hemorrhage   • Esophageal dysphagia   • Anemia in neoplastic disease   • Leukocytes in urine   • Atrial flutter (CMS/[facility])   • Moderate malnutrition (CMS/[facility])     Past Medical History:   Diagnosis Date   • Allergic rhinitis    • Anemia    • Anxiety    • Appetite absent    • Arthritis    • Asthma    • Back pain    • Bell's palsy    • Black tarry stools    • Blood in stool    • Chronic fatigue    • CKD (chronic kidney disease) stage 3, GFR 30-59 ml/min (CMS/[facility])    • Community acquired pneumonia of left lung 10/25/2018   • Cough    • Depression    • Diabetes mellitus ([facility])     LAST A1C 6   • Diabetic gastroparesis (CMS/HCC) 2/19/2016   • Difficulty walking    • Excessive urination at night    • Frequent urination    • GERD (gastroesophageal reflux disease)    • GI bleed    • Gout    • H/O blood clots     LEFT LEG 7 OR 8 YEARS AGO   • Heat intolerance    • History of fall 10/2018   • History of prior pregnancies     x8, miscarriage 5   • History of transfusion 11/2018    due to anemia   • Hyperlipidemia    • Hypertension    • Hypothyroidism    • Normal coronary arteries     by cath 2013   • Orthostatic hypotension    • AARON (obstructive sleep apnea)     DOESNT WEAR REGULARLY   • Pneumonia    • PONV (postoperative nausea and vomiting)    • Skin cancer    • Stroke (CMS/[facility])     Several mini-strokes   • TIA (transient ischemic attack)     LAST TIA JULY 2017   • Urination pain    • UTI (urinary tract infection)     Dec 2018 and Jan 2019     Past Surgical History:   Procedure Laterality Date   • BACK SURGERY      HARDWARE   • CHOLECYSTECTOMY      OPEN   • COLONOSCOPY  2011    due for repeat in 2021   • COLONOSCOPY N/A 10/28/2018    Procedure: COLONOSCOPY;   Surgeon: Emmanuel Rogers MD;  Location:  [facility] OR;  Service: Gastroenterology   • ENDOSCOPY N/A 10/26/2018    Procedure: ESOPHAGOGASTRODUODENOSCOPY;  Surgeon: Emmanuel Rogers MD;  Location:  [facility];  Service: Gastroenterology   • ENDOSCOPY N/A 5/13/2021    Procedure: ESOPHAGOGASTRODUODENOSCOPY, biopsy, dilatation;  Surgeon: Emmanuel Rogers MD;  Location:  [facility] OR;  Service: Gastroenterology;  Laterality: N/A;  Esophagitis; Dilation- no stricture; Biopsy- esophagus   • HYSTERECTOMY      PARTIAL    • KNEE SURGERY     • NECK SURGERY     • SPINE SURGERY     • TUMOR REMOVAL Left     Leg   • UPPER GASTROINTESTINAL ENDOSCOPY  2014    gastritis.  done by dr. hastings       SLP Recommendation and Plan  SLP Swallowing Diagnosis: mild, oral dysphagia, pharyngeal dysphagia, esophageal dysphagia  SLP Diet Recommendation: soft textures, ground, thin liquids, nutritional supplements needed  Recommended Precautions and Strategies: upright posture during/after eating, small bites of food and sips of liquid, multiple swallows per bite of food, alternate between small bites of food and sips of liquid, hard swallow with each bite or sip, general aspiration precautions, reflux precautions, fatigue precautions  SLP Rec. for Method of Medication Administration: meds whole, with thin liquids, as tolerated (1 at a time; no greater or equal to 13 mm in size)     Monitor for Signs of Aspiration: yes, notify SLP if any concerns  Recommended Diagnostics: VFSS (MBS)  Swallow Criteria for Skilled Therapeutic Interventions Met: demonstrates skilled criteria  Anticipated Discharge Disposition (SLP): unknown (home w/home health vs SNF)  Rehab Potential/Prognosis, Swallowing: adequate, monitor progress closely  Therapy Frequency (Swallow): PRN  Predicted Duration Therapy Intervention (Days): 1 week, until discharge               Patient/Family Concerns, Anticipated Discharge Disposition (SLP): Pt continued concern  about pneumonia and swallowing difficulty. Agreeable to further assessment via MBS.         Plan of Care Reviewed With: patient  Outcome Summary: SLP: Completed eval. Pt continues with mild oropharyngeal dysphagia by clinical exam. No overt s/s of aspiration. Does have significant complaints of food hanging/sticking when swallowing and points to the level of the upper esophagus just below the larynx. Greatest on kristen cracker and applesauce. Tolerates water from straw without difficulty. Pt with poor dentition with decaying lower teeth of which some are broken off to the gumline. Recommend a GI soft diet and ground meats with thins. Will proceed with MBS on Monday. Further recommend strict aspiration and reflux precautions, fully upright position during all po intake, oral and denture care before breakfast and after all po intake, meds whole with thins but prefer small medications as past MBS revealed slow transit of 13mm barium tablet thru the GE junction.         SWALLOW EVALUATION (last 72 hours)      SLP Adult Swallow Evaluation     Row Name 08/07/21 9501       Rehab Evaluation    Document Type  evaluation  -AD    Subjective Information  complains of;weakness difficulty swallowing  -AD    Patient Observations  alert;cooperative;agree to therapy  -AD    Patient/Family/Caregiver Comments/Observations  Pt seen upright in a recliner for eval.   -AD    Care Plan Review  evaluation/treatment results reviewed;care plan/treatment goals reviewed;risks/benefits reviewed;current/potential barriers reviewed;patient/other agree to care plan  -AD    Patient Effort  good  -AD    Symptoms Noted During/After Treatment  other (see comments)  -AD    Symptoms Noted, Comment  Reports she is sleeping since getting her scolpolomine patch placed. Reports nausea resolved. No pain reported.  -AD       General Information    Patient Profile Reviewed  yes  -AD    Pertinent History Of Current Problem  Pt is a 82 y/o female known to me from  past evaluations/consultations. Pt admitted with chronic, recurrent pneumonia for which she has received outpt treatment since early June w/o resolution. Also found to be in A-flutter with RVR. CXR indicates masslike opacity or consolidation of the medial left lung base behind the heart that is unchanged since 6/30/21. Increasing patchy infiltrates in both lung bases. Pt with prior EGD on 5/13/21 w/balloon dilation w/no reported improvement of swallowing after that time. Post op diagosis of GERD w/esophagitis w/o hemorrhage, esophageal dysphagia, epigastric pain and reflux esophagitis. Pt with multiple comorbidities with reported recurrent aspiration pneumonias, myelodysplastic syndrome, CKD stage 3-4, HTN, Diabetes Mellitus type 2, chronic diastolic heart failure, esophageal strictures, depression, chronic pain, Bell's Palsy with residual right facial droop, diabetic gastroparesis, TIA, hypothyroidism .  -AD    Current Method of Nutrition  regular textures;thin liquids;nutritional supplements Boost glucose control  -AD    Precautions/Limitations, Vision  other (see comments) diabetic retinopathy reported; functional for exam  -AD    Precautions/Limitations, Hearing  hearing impairment, bilaterally;other (see comments) hrg imp vs slow processing when answering questions  -AD    Prior Level of Function-Communication  WFL  -AD    Prior Level of Function-Swallowing  no diet consistency restrictions;concerns regarding malnutrition;dietary restrictions (e.g. consistent carb, low salt, etc.);esophageal concerns cardiac, consistent carbs, weight loss; GERD/esophagitis  -AD    Plans/Goals Discussed with  patient;agreed upon  -AD    Barriers to Rehab  medically complex;previous functional deficit  -AD    Patient's Goals for Discharge  eat/drink without coughing/choking to find out what is wrong with her  -AD       Pain    Additional Documentation  -- no current pain reported  -AD       Oral Motor Structure and Function     Oral Lesions or Structural Abnormalities and/or variants  Pt with erosion/decay of lower teeth with some being broken off of at the level of the gums. All lower molars are absent. Upper denture plate in place with fair to good fit. No lesions noted.   -AD    Dentition Assessment  upper dentures/partial in place;missing teeth;teeth are in poor condition  -AD    Secretion Management  WNL/WFL  -AD    Mucosal Quality  moist, healthy  -AD    Volitional Swallow  WFL  -AD    Volitional Cough  WFL  -AD       Oral Musculature and Cranial Nerve Assessment    Oral Motor General Assessment  generalized oral motor weakness;oral labial or buccal impairment;lingual impairment;vocal impairment  -AD    Oral Labial or Buccal Impairment, Detail, Cranial Nerve VII (Facial):  right labial droop chronic from Bell's Palsy  -AD    Lingual Impairment, Detail. Cranial Nerves IX, XII (Glossopharyngeal and Hypoglossal)  reduced lingual ROM deviates to rt w/protrusion; decreased elevation; gen wkns  -AD    Vocal Impairment, Detail. Cranial Nerve X (Vagus)  vocal quality abnormality (see comments) hoarse vocal quality; no change w/po intake  -AD       General Eating/Swallowing Observations    Respiratory Support Currently in Use  nasal cannula  -AD    Eating/Swallowing Skills  self-fed;appropriate self-feeding skills observed  -AD    Positioning During Eating  upright 90 degree;upright in chair  -AD    Utensils Used  spoon;straw  -AD    Consistencies Trialed  regular textures;pureed;thin liquids  -AD       Respiratory    Respiratory Status  WFL;room air;during swallowing/eating  -AD       Clinical Swallow Eval    Oral Prep Phase  impaired  -AD    Oral Transit  impaired  -AD    Oral Residue  WFL  -AD    Pharyngeal Phase  no overt signs/symptoms of pharyngeal impairment  -AD    Esophageal Phase  suspected esophageal impairment  -AD    Clinical Swallow Evaluation Summary  Pt presents with a mild oropharyngeal dysphagia with no overt s/s of  aspiration on repeated trials of thins from straw, puree or solids. Pt with complaints of both puree and greater with solids sticking in the throat around the area of the cervical esophagus. Somewhat relieved with thin liquid wash. This did not elicit any type of choking or coughing during the evaluation. Limited po intake overall with reports of poor appetite. Pt demonstrates a hoarse vocal quality throughout the evaluation with no changes in vocal quality during po intake.   -AD       Oral Prep Concerns    Oral Prep Concerns  increased prep time  -AD    Increased Prep Time  regular consistencies  -AD    Oral Prep Concerns, Comment  Increased prep time for solids due to poor dentition. Primarily uses front teeth to chew.   -AD       Oral Transit Concerns    Oral Transit Concerns  other (see comments)  -AD    Oral Transit Concerns, Comment  History of decreased bolus control and pooling of material in the vallecule and thins to the pyriform as noted on most recent MBS in March 2021.   -AD       Esophageal Phase Concerns    Esophageal Phase Concerns  sensation of material sticking  -AD    Sensation of Material Sticking  pudding;regular consistencies  -AD    Esophageal Phase Concerns, Comment  Pt with c/o food sticking in the throat around the area of the upper cervical esophagus just below the larynx. Hx of esophageal dysphagia.   -AD       Clinical Impression    SLP Swallowing Diagnosis  mild;oral dysphagia;pharyngeal dysphagia;esophageal dysphagia  -AD    Functional Impact  risk of aspiration/pneumonia;risk of malnutrition  -AD    Rehab Potential/Prognosis, Swallowing  adequate, monitor progress closely  -AD    Swallow Criteria for Skilled Therapeutic Interventions Met  demonstrates skilled criteria  -AD       Recommendations    Therapy Frequency (Swallow)  PRN  -AD    Predicted Duration Therapy Intervention (Days)  1 week;until discharge  -AD    SLP Diet Recommendation  soft textures;ground;thin  liquids;nutritional supplements needed  -AD    Recommended Diagnostics  VFSS (MBS)  -AD    Recommended Precautions and Strategies  upright posture during/after eating;small bites of food and sips of liquid;multiple swallows per bite of food;alternate between small bites of food and sips of liquid;hard swallow with each bite or sip;general aspiration precautions;reflux precautions;fatigue precautions  -AD    Oral Care Recommendations  Oral Care before breakfast, after meals and PRN  -AD    SLP Rec. for Method of Medication Administration  meds whole;with thin liquids;as tolerated 1 at a time; no greater or equal to 13 mm in size  -AD    Monitor for Signs of Aspiration  yes;notify SLP if any concerns  -AD    Anticipated Discharge Disposition (SLP)  unknown home w/home health vs SNF  -AD    Patient/Family Concerns, Anticipated Discharge Disposition (SLP)  Pt continued concern about pneumonia and swallowing difficulty. Agreeable to further assessment via MBS.  -AD       Swallow Goals (SLP)    Oral Nutrition/Hydration Goal Selection (SLP)  oral nutrition/hydration, SLP goal 1  -AD       Oral Nutrition/Hydration Goal 1 (SLP)    Oral Nutrition/Hydration Goal 1, SLP  Pt will be able to participate in further instrumental assessment via MBS in order  to determine least restrictive, safe diet in 3 days.  -AD    Time Frame (Oral Nutrition/Hydration Goal 1, SLP)  short term goal (STG);3 days  -AD    Barriers (Oral Nutrition/Hydration Goal 1, SLP)  medically complex  -AD    Progress/Outcomes (Oral Nutrition/Hydration Goal 1, SLP)  goal ongoing ordered; plan to complete on 8/9/21.   -AD      User Key  (r) = Recorded By, (t) = Taken By, (c) = Cosigned By    Initials Name Effective Dates    Dahiana Riddle MS CCC-SLP 06/16/21 -           EDUCATION  The patient has been educated in the following areas:   Dysphagia (Swallowing Impairment) Modified Diet Instruction. Pt verbalizes understanding of results and recommendation for  GI soft diet and thins. Agreeable to further assessment via MBS.       SLP GOALS     Row Name 08/07/21 1125             Oral Nutrition/Hydration Goal 1 (SLP)    Oral Nutrition/Hydration Goal 1, SLP  Pt will be able to participate in further instrumental assessment via MBS in order  to determine least restrictive, safe diet in 3 days.  -AD      Time Frame (Oral Nutrition/Hydration Goal 1, SLP)  short term goal (STG);3 days  -AD      Barriers (Oral Nutrition/Hydration Goal 1, SLP)  medically complex  -AD      Progress/Outcomes (Oral Nutrition/Hydration Goal 1, SLP)  goal ongoing ordered; plan to complete on 8/9/21.   -AD        User Key  (r) = Recorded By, (t) = Taken By, (c) = Cosigned By    Initials Name Provider Type    Dahiana Riddle MS CCC-SLP Speech and Language Pathologist             Time Calculation:   Time Calculation- SLP     Row Name 08/07/21 1221             Time Calculation- SLP    SLP Start Time  1025  -AD      SLP Stop Time  1125  -AD      SLP Time Calculation (min)  60 min  -AD      Total Timed Code Minutes- SLP  0 minute(s)  -AD      SLP Non-Billable Time (min)  0 min  -AD      SLP Received On  08/07/21  -AD      SLP - Next Appointment  08/09/21  -AD      SLP Goal Re-Cert Due Date  08/13/21  -AD         Untimed Charges    SLP Eval/Re-eval   ST Eval Oral Pharyng Swallow - 20054  -AD      77585-DQ Eval Oral Pharyng Swallow Minutes  60  -AD         Total Minutes    Untimed Charges Total Minutes  60  -AD       Total Minutes  60  -AD        User Key  (r) = Recorded By, (t) = Taken By, (c) = Cosigned By    Initials Name Provider Type    Dahiana Riddle MS CCC-SLP Speech and Language Pathologist          Therapy Charges for Today     Code Description Service Date Service Provider Modifiers Qty    [id number] HC ST EVAL ORAL PHARYNG SWALLOW 4 8/7/2021 Dahiana Villa MS CCC-SLP GN 1               Dahiana Villa MS CCC-GEORGE  8/7/2021

## 2021-08-07 NOTE — PLAN OF CARE
Goal Outcome Evaluation:               Pt pivoted to chair. 2L n/c remained on, think mainly for comfort.  CT of chest performed.  Unable to get sputum sample.  Scopolamine patch did relieve nausea; patch removed.  Pt ha not c/o nausea since.

## 2021-08-07 NOTE — PLAN OF CARE
Goal Outcome Evaluation:  Plan of Care Reviewed With: patient           Outcome Summary: PT evaluation completed. pt required min assist for supine to sit transfer. pt required CGA for sit to stand transfer and stand pivot transfer from bed to chair with RW. pt able to scoot self back into chair with supervison. pt with co pain in L shoulder and chronic dizziness and nausea throughout evaluation. pt would benefit from skilled PT services to address gait, balance, strength, and transfers. will see pt while in hospital, pt may benefit from SNF upon discharge.

## 2021-08-07 NOTE — PROGRESS NOTES
Discussed with Dr. Alvarado.     Serum creatinine = 3.18 mg/dl - increased  CrCl = 15 ml/min    Per our discussion, in response to renal function changes, will dose Cefepime 2 grams every 24 hours.  Will d/c Lovenox and change anticoagulant back to apixaban for atrial fibrillation. Apixaban regimen will be 2.5 mg BID. Will continue to monitor closely.    Relevant clinical data and objective history reviewed:  Lab Results   Component Value Date/Time    CREATININE 3.18 (H) 08/07/2021 05:09 AM    CREATININE 2.68 (H) 08/06/2021 06:03 AM    CREATININE 1.96 (H) 06/02/2021 10:19 AM    BUN 52 (H) 08/07/2021 05:09 AM    BUN 48 (H) 08/06/2021 06:03 AM    BUN 23 06/02/2021 10:19 AM     Estimated Creatinine Clearance: 14.9 mL/min (A) (by C-G formula based on SCr of 3.18 mg/dL (H)).    Lab Results   Component Value Date/Time    WBC 5.72 08/07/2021 05:09 AM    WBC 5.5 01/12/2021 10:10 AM    HGB 9.3 (L) 08/07/2021 05:09 AM    HCT 30.7 (L) 08/07/2021 05:09 AM    MCV 97.5 (H) 08/07/2021 05:09 AM     08/07/2021 05:09 AM     Temp Readings from Last 3 Encounters:   08/07/21 98.2 °F (36.8 °C) (Oral)   08/04/21 97.1 °F (36.2 °C) (Temporal)   07/29/21 97.2 °F (36.2 °C)     Weight:  (204#) = 92.5 kg    Thank you-    Jarod Corral, MuD

## 2021-08-07 NOTE — PROGRESS NOTES
Pharmacy Consult - Lovenox Dosing    Joya Singh is a 81 y.o. female who has been consulted for Lovenox dosing for Atrial fibrillation requiring full anticoagulation.    Relevant clinical data and objective history reviewed:  Lab Results   Component Value Date/Time    CREATININE 3.18 (H) 08/07/2021 05:09 AM    CREATININE 2.68 (H) 08/06/2021 06:03 AM    CREATININE 1.96 (H) 06/02/2021 10:19 AM    BUN 52 (H) 08/07/2021 05:09 AM    BUN 48 (H) 08/06/2021 06:03 AM    BUN 23 06/02/2021 10:19 AM     Estimated Creatinine Clearance: 14.9 mL/min (A) (by C-G formula based on SCr of 3.18 mg/dL (H)).    Lab Results   Component Value Date/Time    WBC 5.72 08/07/2021 05:09 AM    WBC 5.5 01/12/2021 10:10 AM    HGB 9.3 (L) 08/07/2021 05:09 AM    HCT 30.7 (L) 08/07/2021 05:09 AM    MCV 97.5 (H) 08/07/2021 05:09 AM     08/07/2021 05:09 AM     Temp Readings from Last 3 Encounters:   08/07/21 98.2 °F (36.8 °C) (Oral)   08/04/21 97.1 °F (36.2 °C) (Temporal)   07/29/21 97.2 °F (36.2 °C)     Weight:  (204#) = 92.5 kg    Assessment/Plan  The patient will be started on Lovenox 90 mg daily (1mg /kg daily - adjusted based on CrCl = 15 ml/min). Pharmacy will continue to follow the patient’s clinical progress daily.    Thank you-    Jarod Corral, MuD

## 2021-08-07 NOTE — PLAN OF CARE
Goal Outcome Evaluation:  Plan of Care Reviewed With: patient           Outcome Summary: SLP: Completed eval. Pt continues with mild oropharyngeal dysphagia by clinical exam. No overt s/s of aspiration. Does have significant complaints of food hanging/sticking when swallowing and points to the level of the upper esophagus just below the larynx. Greatest on kristen cracker and applesauce. Tolerates water from straw without difficulty. Pt with poor dentition with decaying lower teeth of which some are broken off to the gumline. Recommend a GI soft diet and ground meats with thins. Will proceed with MBS on Monday. Further recommend strict aspiration and reflux precautions, fully upright position during all po intake, oral and denture care before breakfast and after all po intake, meds whole with thins but prefer small medications as past MBS revealed slow transit of 13mm barium tablet thru the GE junction.

## 2021-08-08 NOTE — PROGRESS NOTES
Dr. PHILL Rodriguez    [facility] MED SURG    8/8/2021    Patient ID:  Name:  Joya Singh  MRN:  [id number]  1940  81 y.o.  female            CC/Reason for visit: Recurrent pneumonia    Interval hx: Patient is feeling better.  She has been weaned off of oxygen.  Mild nonproductive cough    ROS: No chest pain, no fever, no abdominal pain    Vitals:  Vitals:    08/08/21 0615 08/08/21 0620 08/08/21 0922 08/08/21 1141   BP: 128/64 123/52  144/73   BP Location: Right arm Right arm  Left arm   Patient Position: Sitting Standing  Sitting   Pulse: 73 83  76   Resp: 18 18  17   Temp:    98.2 °F (36.8 °C)   TempSrc:    Oral   SpO2: 94% 97%  92%   Weight:   88.6 kg (195 lb 4.8 oz)    Height:               Body mass index is 36.9 kg/m².    Intake/Output Summary (Last 24 hours) at 8/8/2021 1317  Last data filed at 8/8/2021 1156  Gross per 24 hour   Intake 360 ml   Output 1300 ml   Net -940 ml       Exam:  GEN:  No distress  Alert, oriented x 3.   LUNGS: A few coarse breath sounds but overall clear breath sounds bilat, no use of accessory muscles  CV:  Normal S1S2, without murmur, trace ankle edema  ABD:  Non tender, obesity hampers rest of the exam      Scheduled meds:  amiodarone, 200 mg, Oral, Q12H  apixaban, 2.5 mg, Oral, Q12H  atorvastatin, 10 mg, Oral, Daily  cefepime, 2 g, Intravenous, Q24H  desvenlafaxine, 50 mg, Oral, Daily  famotidine, 40 mg, Oral, BID  gabapentin, 100 mg, Oral, QAM  gabapentin, 200 mg, Oral, Nightly  guaiFENesin, 1,200 mg, Oral, BID  insulin aspart, 0-7 Units, Subcutaneous, TID AC  insulin detemir, 20 Units, Subcutaneous, Nightly  levothyroxine, 88 mcg, Oral, Daily  midodrine, 2.5 mg, Oral, TID AC  nystatin, , Topical, Q12H  O2, 2 L/min, Inhalation, Nightly  pantoprazole, 40 mg, Oral, QAM  sennosides-docusate, 2 tablet, Oral, Daily  sodium chloride, 10 mL, Intravenous, Q12H      IV meds:                           Data Review:   I reviewed the patient's medications and new clinical  results.    COVID19   Date Value Ref Range Status   08/06/2021 Not Detected Not Detected - Ref. Range Final         Lab Results   Component Value Date    CALCIUM 8.6 08/07/2021    PHOS 4.7 (H) 07/30/2020    MG 2.3 08/06/2021    MG 2.1 07/29/2020    MG 2.1 10/25/2018     Results from last 7 days   Lab Units 08/08/21  0359 08/07/21  0509 08/06/21  0603 08/04/21  1013   SODIUM mmol/L  --  137 136  --    POTASSIUM mmol/L  --  4.9 4.9  --    CHLORIDE mmol/L  --  105 102  --    CO2 mmol/L  --  26.2 26.2  --    BUN mg/dL  --  52* 48*  --    CREATININE mg/dL 2.84* 3.18* 2.68*  --    CALCIUM mg/dL  --  8.6 9.1  --    BILIRUBIN mg/dL  --   --  0.7  --    ALK PHOS U/L  --   --  169*  --    ALT (SGPT) U/L  --   --  16  --    AST (SGOT) U/L  --   --  18  --    GLUCOSE mg/dL  --  114* 96  --    WBC 10*3/mm3  --  5.72 8.29 6.43   HEMOGLOBIN g/dL  --  9.3* 11.3* 11.7*   PLATELETS 10*3/mm3  --  235 290 269   INR   --   --  1.58*  --    PROBNP pg/mL  --   --  3,842.0*  --    PROCALCITONIN ng/mL  --  1.04* 0.71*  --      Results from last 7 days   Lab Units 08/06/21  1427 08/06/21  0740 08/06/21  0603   BLOODCX   --  No growth at 2 days No growth at 2 days   MRSACX  No Methicillin Resistant Staphylococcus aureus isolated  --   --            ASSESSMENT:     Atrial flutter (CMS/HCC)    Moderate malnutrition (CMS/HCC)  Recurrent aspiration pneumonia  GERD        PLAN:  I have visualized images of CT chest.  The patient has recurrent pneumonia at the bases, left greater than right but there is definitely bilateral posterior and inferior patchy airspace disease indicating aspiration pneumonia.  Her clinical symptoms and her CT imaging all point toward silent aspiration with recurrent aspiration pneumonia.  No additional antibiotics are indicated.  The patient currently is healing from pneumonitis.  Her airway kedar has most likely been completely sterilized by azithromycin followed by Omnicef and moxifloxacin over the past 5 weeks.  We do  not want to cause C. difficile infection in this patient.  Aggravating factors are certainly her regular medications such as Flexeril which she takes at a high dose, combined with opioids and gabapentin.  I have told him to reduce Flexeril to only 1 tablet daily and preferably not at night.  They are also going to try to reduce the dose of the opioid medication.  Continue gabapentin for restless leg but be careful not to combine gabapentin at the same time that she takes Lortab or Flexeril.  Of course antireflux measures should be implemented such as elevating the head of the bed 10 to 15 degrees if possible at home.  Also allowing more than 4 hours between suppertime and bedtime, and no snacking after supper or before bedtime in order to have an empty stomach when she goes to bed.  Also avoid foods that are known triggers of acid reflux such as candy, chocolate, coffee, wine or any other particular foods that the patient can identify as triggering acid reflux.  Antiacid therapy alone is not sufficient.  We need to tackle the dosages of her sedating medications as well as implement lifestyle dietary modifications.  No need for bronchoscopy at this time.  She has been weaned off of oxygen which is a good sign.  Increase her mobility and continue to follow the patient clinically.        Darío Rodriguez MD  8/8/2021

## 2021-08-08 NOTE — PROGRESS NOTES
Chart reviewed, VS stable, remains in NSR. Labs not performed this morning; will defer to primary team.  CT reviewed, no pulmonary edema.    No cardiac changes today.

## 2021-08-08 NOTE — CASE MANAGEMENT/SOCIAL WORK
Continued Stay Note  VICKY Sharma     Patient Name: Joya Singh  MRN: [id number]  Today's Date: 8/8/2021    Admit Date: 8/6/2021    Discharge Plan     Row Name 08/08/21 1435       Plan    Plan  Discharge home with family    Plan Comments  I spoke with the patient at bedside.  She advised that she is looking forward to going home.  She further denied having any needs other than [facility] for Home [facility].  She advised that she lives with her son and daughter in law.  She had no further questions or concerns at this time.  CM will continue to follow.        Discharge Codes    No documentation.             Heydi Parry RN

## 2021-08-08 NOTE — PLAN OF CARE
Goal Outcome Evaluation:  Plan of Care Reviewed With: patient        Progress: improving  Outcome Summary: VSS; continues O2 (2L) at home level; Sit up when eating and drinking. continues with thin liquids and GI soft diet. Pt has not defecated since monday.   Self

## 2021-08-08 NOTE — PLAN OF CARE
Goal Outcome Evaluation:  Plan of Care Reviewed With: patient        Progress: improving  Outcome Summary: Patient here for shortness of breath, a-flutter. Feeling better today but doesnt remember why she is here or what is going on at times. Ambulate in room with 1 assist to chair & BSC. Blood glucose is better today. Likes chocolate boost. Son & daughter in law here today & encouraging patient to be able to come home at discharge.

## 2021-08-08 NOTE — PROGRESS NOTES
"Hospitalist Team      Patient Care Team:  Chari Mercado MD as PCP - General  Chari Mercado MD as PCP - Family Medicine  Christos Rob MD as Surgeon (General Surgery)  German Wylie MD as Surgeon (Orthopedic Surgery)  Yasmani Baugh MD as Consulting Physician (Nephrology)  Yasmani Baugh MD as Referring Physician (Nephrology)  Elieser Headley MD as Consulting Physician (Hematology and Oncology)        Chief Complaint: Follow-up Acute Hypoxic Respiratory Failure    Subjective    Feels better this morning.  She has been liberated off of O2.  She feels her breathing is better.  She denies chest pain.  Tolerating PO.    Objective    Vital Signs  Temp:  [97.6 °F (36.4 °C)-98.5 °F (36.9 °C)] 98.1 °F (36.7 °C)  Heart Rate:  [63-83] 83  Resp:  [16-19] 18  BP: (102-140)/(45-64) 123/52  Oxygen Therapy  SpO2: 97 %  Pulse Oximetry Type: Continuous  Device (Oxygen Therapy): room air  Flow (L/min): 2}  Flowsheet Rows      First Filed Value   Admission Height  154.9 cm (61\") Documented at 08/06/2021 0600   Admission Weight  87 kg (191 lb 12.8 oz) Documented at 08/06/2021 0600        Physical Exam:    General: Appears stated age in no acute distress.  Lungs: Breath sounds are diminished throughout all fields.  Her breathing is non-labored.  CV:  Regular rate and rhythm.  No murmur appreciated.  Radial pulses are 2+ and symmetric.  Abdomen: Obese, soft, and non-tender w/ active bowel sounds.  MSK: No C/C/E.  No asymmetry of the BLE.  Neuro: CN II-XII grossly intact.  Psych: Pleasant affect.    Results Review:     I reviewed the patient's new clinical results.    Lab Results (last 24 hours)     Procedure Component Value Units Date/Time    Blood Culture - Blood, Hand, Left [[id number]] Collected: 08/06/21 0740    Specimen: Blood from Hand, Left Updated: 08/08/21 0745     Blood Culture No growth at 2 days    POC Glucose Once [[id number]]  (Abnormal) Collected: 08/08/21 0731    Specimen: " Blood Updated: 08/08/21 0743     Glucose 160 mg/dL      Comment: Meter: [id number] : [id number] Juvencio DICK       Blood Culture - Blood, Arm, Right [[id number]] Collected: 08/06/21 0603    Specimen: Blood from Arm, Right Updated: 08/08/21 0715     Blood Culture No growth at 2 days    Digoxin Level [[id number]]  (Normal) Collected: 08/08/21 0359    Specimen: Blood Updated: 08/08/21 0513     Digoxin 1.14 ng/mL     POC Glucose Once [[id number]]  (Abnormal) Collected: 08/07/21 2028    Specimen: Blood Updated: 08/07/21 2034     Glucose 144 mg/dL      Comment: Meter: [id number] : [id number] Elgin DE LUNA       MRSA Screen Culture (Outpatient) - Swab, Nares [[id number]]  (Normal) Collected: 08/06/21 1427    Specimen: Swab from Nares Updated: 08/07/21 1926     MRSA Screen Cx No Methicillin Resistant Staphylococcus aureus isolated    POC Glucose Once [[id number]]  (Normal) Collected: 08/07/21 1641    Specimen: Blood Updated: 08/07/21 1648     Glucose 80 mg/dL      Comment: Meter: [id number] : [id number] Justice Simpson CNA             Imaging Results (Last 24 Hours)     Procedure Component Value Units Date/Time    CT Chest Without Contrast Diagnostic [[id number]] Collected: 08/07/21 1459     Updated: 08/07/21 1501    Narrative:      CT Chest WO    INDICATION:   Worsening shortness of air with cough and congestion    TECHNIQUE:   CT of the thorax without IV contrast. Coronal and sagittal reconstructions were obtained.  Radiation dose reduction techniques included automated exposure control or exposure modulation based on body size. Count of known CT and cardiac nuc med studies  performed in previous 12 months: 0.     COMPARISON:   Chest x-ray from yesterday, chest CT from 11/3/2017    FINDINGS:  Is a faint rounded infiltrate or nodule in the right upper lobe posteriorly on image 19 measuring 6 mm in diameter.. This is stable from 11/3/2017. Rest the upper lobes are clear. There is mild right base atelectasis  and there is left base atelectasis  with some patchy infiltrates in the left base as well.    The aorta is within normal limits in size. There is no adenopathy is identified. Upper abdominal images are unremarkable. Bones are unremarkable.      Impression:      Patchy left lower lobe infiltrates    Mild bibasilar atelectasis    Stable 6 mm nodule right upper lobe unchanged in 2017    Signer Name: Henry Mendiola MD   Signed: 8/7/2021 2:59 PM   Workstation Name: [facility]    [facility] reviewed personally by physician.      Medication Review:   I have reviewed the patient's current medication list    Current Facility-Administered Medications:   •  acetaminophen (TYLENOL) tablet 650 mg, 650 mg, Oral, Q4H PRN **OR** acetaminophen (TYLENOL) 160 MG/5ML solution 650 mg, 650 mg, Oral, Q4H PRN **OR** acetaminophen (TYLENOL) suppository 650 mg, 650 mg, Rectal, Q4H PRN, Serafin Alvarado DO  •  albuterol (PROVENTIL) nebulizer solution 0.083% 2.5 mg/3mL, 2.5 mg, Nebulization, Q6H PRN, Serafin Alvarado DO  •  amiodarone (PACERONE) tablet 200 mg, 200 mg, Oral, Q12H, Serafin Alvarado DO, 200 mg at 08/08/21 0840  •  apixaban (ELIQUIS) tablet 2.5 mg, 2.5 mg, Oral, Q12H, Serafin Alvarado DO, 2.5 mg at 08/08/21 0839  •  atorvastatin (LIPITOR) tablet 10 mg, 10 mg, Oral, Daily, Serafin Alvarado DO, 10 mg at 08/08/21 0840  •  cefepime (MAXIPIME) IVPB 2 g/50ml D5W (premix), 2 g, Intravenous, Q24H, Serafin Alvarado DO, 2 g at 08/08/21 0657  •  desvenlafaxine (PRISTIQ) 24 hr tablet 50 mg, 50 mg, Oral, Daily, Serafin Alvarado DO, 50 mg at 08/08/21 0840  •  dextrose (D50W) 25 g/ 50mL Intravenous Solution 25 g, 25 g, Intravenous, Q15 Min PRN, Serafin Alvarado, DO  •  dextrose (GLUTOSE) oral gel 15 g, 15 g, Oral, Q15 Min PRN, Serafin Alvarado, DO  •  famotidine (PEPCID) tablet 40 mg, 40 mg, Oral, BID, Serafin Alvarado, DO, 40 mg at 08/08/21 0839  •  gabapentin (NEURONTIN)  capsule 100 mg, 100 mg, Oral, QAM, Serafin Alvarado, DO, 100 mg at 08/08/21 0657  •  gabapentin (NEURONTIN) capsule 200 mg, 200 mg, Oral, Nightly, Serafin Alvarado DO, 200 mg at 08/07/21 2103  •  glucagon (GLUCAGEN) injection 1 mg, 1 mg, Subcutaneous, Q15 Min PRN, Serafin Alvarado DO  •  guaiFENesin (MUCINEX) 12 hr tablet 1,200 mg, 1,200 mg, Oral, BID, Serafin Alvarado DO, 1,200 mg at 08/08/21 0839  •  HYDROcodone-acetaminophen (NORCO) 5-325 MG per tablet 1 tablet, 1 tablet, Oral, Q4H PRN, Serafin Alvarado DO  •  insulin aspart (novoLOG) injection 0-7 Units, 0-7 Units, Subcutaneous, TID AC, Serafin Alvarado DO, 3 Units at 08/07/21 1306  •  insulin detemir (LEVEMIR) injection 20 Units, 20 Units, Subcutaneous, Nightly, Serafin Alvarado DO, 20 Units at 08/06/21 2131  •  levothyroxine (SYNTHROID, LEVOTHROID) tablet 88 mcg, 88 mcg, Oral, Daily, Serafin Alvarado DO, 88 mcg at 08/08/21 0840  •  melatonin tablet 5 mg, 5 mg, Oral, Nightly PRN, Serafin Alvarado DO  •  midodrine (PROAMATINE) tablet 2.5 mg, 2.5 mg, Oral, TID AC, Serafin Alvarado DO, 2.5 mg at 08/08/21 0657  •  nitroglycerin (NITROSTAT) SL tablet 0.4 mg, 0.4 mg, Sublingual, Q5 Min PRN, Serafin Alvarado DO  •  O2 (OXYGEN), 2 L/min, Inhalation, Nightly, Serafin Alvarado DO, 2 L/min at 08/06/21 2121  •  ondansetron (ZOFRAN) tablet 4 mg, 4 mg, Oral, Q6H PRN **OR** ondansetron (ZOFRAN) injection 4 mg, 4 mg, Intravenous, Q6H PRN, Serafin Alvarado DO, 4 mg at 08/07/21 0829  •  ondansetron (ZOFRAN) tablet 8 mg, 8 mg, Oral, TID PRN, Serafin Alvarado DO  •  pantoprazole (PROTONIX) EC tablet 40 mg, 40 mg, Oral, QAM, Serafin Alvarado, , 40 mg at 08/08/21 0657  •  sennosides-docusate (PERICOLACE) 8.6-50 MG per tablet 2 tablet, 2 tablet, Oral, Daily, Serafin Alvarado DO, 2 tablet at 08/08/21 0840  •  sodium chloride 0.9 % flush 10 mL, 10 mL, Intravenous, PRN, Serafin Alvarado DO, 10 mL at 08/06/21 1411  •   sodium chloride 0.9 % flush 10 mL, 10 mL, Intravenous, Q12H, Serafin Alvarado DO, 10 mL at 08/07/21 2100  •  sodium chloride 0.9 % infusion 40 mL, 40 mL, Intravenous, PRN, Serafin Alvarado DO    Facility-Administered Medications Ordered in Other Encounters:   •  acetaminophen (TYLENOL) tablet 650 mg, 650 mg, Oral, Q6H PRN, Elieser Headley MD  •  diphenhydrAMINE (BENADRYL) capsule 25 mg, 25 mg, Oral, Once, Elieser Headley MD  •  lactated ringers infusion, 100 mL/hr, Intravenous, Continuous, Serafin Ferreira CRNA  •  ondansetron (ZOFRAN) injection 4 mg, 4 mg, Intravenous, Once PRN, Serafin Ferreira CRNA      Assessment/Plan     1.  Acute Hypoxic Respiratory Failure: Resolved.  CT reviewed along w/ Dr. Rodriguez's findings.  Will mobilize today.  Antibiotics stopped.    2.  New-onset afib/flutter: Regular on exam.  Cardiology following.    3.  JOYCE on CKD IV:  Check creatinine in the AM.  Baseline around 2.2.    4.  Hypertension w/ history of orthostasis: BP trend at goal.  Continue to monitor.  Midodrine as needed.    5.  Diabetes Mellitus, Type 2 in Obese: Bedsides at goal.  Continue to monitor on current regimen.     Plan for disposition: Predicated on hospital course.    Elton Guzman MD  08/08/21  09:28 EDT

## 2021-08-08 NOTE — PROGRESS NOTES
Per system dosing recommendations, reduced famotidine regimen to daily dosing.     Thank you     Maryuri DobbinsD

## 2021-08-09 NOTE — SIGNIFICANT NOTE
"   08/09/21 0949   OTHER   Discipline occupational therapist   Rehab Time/Intention   Session Not Performed other (see comments);patient declined treatment  (Patient declined all activity, stated, \"I don't feel well.\")     "

## 2021-08-09 NOTE — MBS/VFSS/FEES
Acute Care - Speech Language Pathology   Swallow Modified Barium Swallow Study  Angie Doran     Patient Name: Joya Singh  : 1940  MRN: [id number]  Today's Date: 2021  Onset of Illness/Injury or Date of Surgery: 21     Referring Physician: Rashmi LE      Admit Date: 2021    Visit Dx:     ICD-10-CM ICD-9-CM   1. Atrial flutter, unspecified type (CMS/[facility])  I48.92 427.32   2. Pneumonia of left lower lobe due to infectious organism  J18.9 486   3. JOYCE (acute kidney injury) (CMS/[facility])  N17.9 584.9   4. Oropharyngeal dysphagia  R13.12 787.22     Patient Active Problem List   Diagnosis   • Arthritis   • Chronic back pain   • Chronic anemia   • Anxiety and depression   • Type 2 diabetes mellitus with neurologic complication (CMS/[facility])   • Brittle diabetes mellitus (CMS/[facility])   • Dizzy   • Hyperlipidemia   • Hypertension   • Insomnia with sleep apnea   • Obstructive sleep apnea syndrome   • Peripheral neuropathy   • Diabetic gastroparesis (CMS/[facility])   • Primary localized osteoarthrosis of left shoulder region   • Rotator cuff tendonitis   • Acquired hypothyroidism   • Anxiety   • History of biliary T-tube placement   • CKD stage 3 due to type 2 diabetes mellitus (CMS/[facility])   • Complex tear of medial meniscus of right knee as current injury   • Insufficiency fracture of tibia   • Primary osteoarthritis of left knee   • Orthostatic hypotension   • Tendinitis of right rotator cuff   • AC joint arthropathy   • Subacromial impingement of right shoulder   • Right carpal tunnel syndrome   • Anemia requiring transfusions   • Monoclonal gammopathy of unknown significance (MGUS)   • Myelodysplastic syndrome with 5q deletion (CMS/[facility])   • History of deep venous thrombosis (DVT) of distal vein of left lower extremity   • Moderate episode of recurrent major depressive disorder (CMS/[facility])   • Chronic diastolic CHF (congestive heart failure) (CMS/[facility])   • Knee pain   • Primary osteoarthritis of right knee   • Iron  overload, transfusional   • Uncontrolled type 2 diabetes mellitus with hyperglycemia (CMS/[facility])   • Localized edema   • Type 2 diabetes mellitus with diabetic neuropathy (CMS/[facility])   • Vitamin D deficiency   • Subacromial bursitis of left shoulder joint   • Gastroesophageal reflux disease with esophagitis without hemorrhage   • Esophageal dysphagia   • Anemia in neoplastic disease   • Leukocytes in urine   • Atrial flutter (CMS/[facility])   • Moderate malnutrition (CMS/[facility])     Past Medical History:   Diagnosis Date   • Allergic rhinitis    • Anemia    • Anxiety    • Appetite absent    • Arthritis    • Asthma    • Back pain    • Bell's palsy    • Black tarry stools    • Blood in stool    • Chronic fatigue    • CKD (chronic kidney disease) stage 3, GFR 30-59 ml/min (CMS/[facility])    • Community acquired pneumonia of left lung 10/25/2018   • Cough    • Depression    • Diabetes mellitus (CMS/[facility])     LAST A1C 6   • Diabetic gastroparesis (CMS/[facility]) 2/19/2016   • Difficulty walking    • Excessive urination at night    • Frequent urination    • GERD (gastroesophageal reflux disease)    • GI bleed    • Gout    • H/O blood clots     LEFT LEG 7 OR 8 YEARS AGO   • Heat intolerance    • History of fall 10/2018   • History of prior pregnancies     x8, miscarriage 5   • History of transfusion 11/2018    due to anemia   • Hyperlipidemia    • Hypertension    • Hypothyroidism    • Normal coronary arteries     by cath 2013   • Orthostatic hypotension    • AARON (obstructive sleep apnea)     DOESNT WEAR REGULARLY   • Pneumonia    • PONV (postoperative nausea and vomiting)    • Skin cancer    • Stroke (CMS/[facility])     Several mini-strokes   • TIA (transient ischemic attack)     LAST TIA JULY 2017   • Urination pain    • UTI (urinary tract infection)     Dec 2018 and Jan 2019     Past Surgical History:   Procedure Laterality Date   • BACK SURGERY      HARDWARE   • CHOLECYSTECTOMY      OPEN   • COLONOSCOPY  2011    due for repeat in 2021   • COLONOSCOPY  N/A 10/28/2018    Procedure: COLONOSCOPY;  Surgeon: Emmanuel Rogers MD;  Location:  [facility];  Service: Gastroenterology   • ENDOSCOPY N/A 10/26/2018    Procedure: ESOPHAGOGASTRODUODENOSCOPY;  Surgeon: Emmanuel Rogers MD;  Location:  [facility];  Service: Gastroenterology   • ENDOSCOPY N/A 5/13/2021    Procedure: ESOPHAGOGASTRODUODENOSCOPY, biopsy, dilatation;  Surgeon: Emmanuel Rogers MD;  Location:  [facility];  Service: Gastroenterology;  Laterality: N/A;  Esophagitis; Dilation- no stricture; Biopsy- esophagus   • HYSTERECTOMY      PARTIAL    • KNEE SURGERY     • NECK SURGERY     • SPINE SURGERY     • TUMOR REMOVAL Left     Leg   • UPPER GASTROINTESTINAL ENDOSCOPY  2014    gastritis.  done by dr. hastings       SLP Recommendation and Plan  SLP Swallowing Diagnosis: mild, oral dysphagia, pharyngeal dysphagia  SLP Diet Recommendation: soft textures, ground, thin liquids, nutritional supplements needed  Recommended Precautions and Strategies: upright posture during/after eating, small bites of food and sips of liquid, multiple swallows per bite of food, alternate between small bites of food and sips of liquid, hard swallow with each bite or sip, general aspiration precautions, reflux precautions, fatigue precautions  SLP Rec. for Method of Medication Administration: meds whole, with thin liquids, as tolerated (1 at a time; no greater or equal to 13mm in size)     Monitor for Signs of Aspiration: notify SLP if any concerns     Swallow Criteria for Skilled Therapeutic Interventions Met: baseline status  Anticipated Discharge Disposition (SLP): unknown     Therapy Frequency (Swallow): evaluation only     Demonstrates Need for Referral to Another Service: /spiritual care, psychology services  Daily Summary of Progress (SLP): progress toward functional goals as expected, prepare for discharge    Plan for Continued Treatment (SLP): Completed MBS. No further SLP is recommended at this  time. Pt is at her baseline swallowing status. Recommend further evaluation of the esophagus if needed to improve her clinical picture and when pt able to fully participate.     Patient/Family Concerns, Anticipated Discharge Disposition (SLP): Pt concerned about dying. Difficult to console during procedure.          Plan of Care Reviewed With: patient, other (see comments) (MD, Dr. Guzman; RN, Peyton)  Outcome Summary: SLP/MBS: Pt presents with a mild oropharyngeal dysphagia. Very limited study due to pt w/decreased cooperation and confusion. No gross aspiration or penetation viewed on limited trials of thins, nectar, honey and puree. Pt refused sold and manually spit out. Delay of swallow noted on nectar to the level of the valleculae and to the pyriforms on trials of thins. Discoordinated lingual movement  but unable to state if this is baseline or due to confusion and pt not wanting to take any of the trials. Decreased vestibular closure noted at the beginning of the initiation of the pharyngeal swallow due to delay. No oral or significant pharyngeal residue after the swallow. Unable to complete esophageal screen at this time due to poor cooperation overall and difficulty getting pt to remain still during the images. Recommend to continue with GI soft diet with thins and aspiration/reflux precautions. No further dysphagia therapy indicated at this time. May benefit from spiritual or psych intervention due to reports of depression and stating she is going to die frequently.         SWALLOW EVALUATION (last 72 hours)      SLP Adult Swallow Evaluation     Row Name 08/09/21 1010 08/07/21 1125       Rehab Evaluation    Document Type  evaluation  -AD  evaluation  -AD    Subjective Information  complains of;nausea/vomiting that she is sick and she is dying  -AD  complains of;weakness difficulty swallowing  -AD    Patient Observations  alert;poorly cooperative  -AD  alert;cooperative;agree to therapy  -AD     Patient/Family/Caregiver Comments/Observations  Pt seen upright in a chair for a modified barium swallow study. Pt with decreased cooperation during the evaluation with complaints of nausea, feeling of getting sick, stating 'you're killing me' and 'I'm dying'. Pt had difficulty remaining still and was unwilling at times to take po contrast for the exam. Significant encouragement and coaxing to participate throughout the exam. Limited images obtained.   -AD  Pt seen upright in a recliner for eval.   -AD    Care Plan Review  evaluation/treatment results reviewed;care plan/treatment goals reviewed;risks/benefits reviewed;current/potential barriers reviewed Pt unable to demonstrate understanding at this time.   -AD  evaluation/treatment results reviewed;care plan/treatment goals reviewed;risks/benefits reviewed;current/potential barriers reviewed;patient/other agree to care plan  -AD    Care Plan Review, Other Participant(s)  other (see comments) RNOlivier MD, Dr. Guzman  -AD  --    Patient Effort  fair  -AD  good  -AD    Symptoms Noted During/After Treatment  other (see comments)  -AD  other (see comments)  -AD    Symptoms Noted, Comment  Confusion and depression  -AD  Reports she is sleeping since getting her scolpolomine patch placed. Reports nausea resolved. No pain reported.  -AD       General Information    Patient Profile Reviewed  yes  -AD  yes  -AD    Pertinent History Of Current Problem  No changes in history. MBS ordered due to concerns for aspiration and silent aspiration.   -AD  Pt is a 80 y/o female known to me from past evaluations/consultations. Pt admitted with chronic, recurrent pneumonia for which she has received outpt treatment since early June w/o resolution. Also found to be in A-flutter with RVR. CXR indicates masslike opacity or consolidation of the medial left lung base behind the heart that is unchanged since 6/30/21. Increasing patchy infiltrates in both lung bases. Pt with prior EGD on  5/13/21 w/balloon dilation w/no reported improvement of swallowing after that time. Post op diagosis of GERD w/esophagitis w/o hemorrhage, esophageal dysphagia, epigastric pain and reflux esophagitis. Pt with multiple comorbidities with reported recurrent aspiration pneumonias, myelodysplastic syndrome, CKD stage 3-4, HTN, Diabetes Mellitus type 2, chronic diastolic heart failure, esophageal strictures, depression, chronic pain, Bell's Palsy with residual right facial droop, diabetic gastroparesis, TIA, hypothyroidism .  -AD    Current Method of Nutrition  regular textures;thin liquids;nutritional supplements Boost glucose control  -AD  regular textures;thin liquids;nutritional supplements Boost glucose control  -AD    Precautions/Limitations, Vision  other (see comments) diabetic retinopathy reported; functional for exam  -AD  other (see comments) diabetic retinopathy reported; functional for exam  -AD    Precautions/Limitations, Hearing  hearing impairment, bilaterally;other (see comments) hrg imp vs slow processing when answering questions  -AD  hearing impairment, bilaterally;other (see comments) hrg imp vs slow processing when answering questions  -AD    Prior Level of Function-Communication  WFL  -AD  WFL  -AD    Prior Level of Function-Swallowing  no diet consistency restrictions;concerns regarding malnutrition;dietary restrictions (e.g. consistent carb, low salt, etc.);esophageal concerns cardiac, consistent carbs, weight loss; GERD/esophagitis  -AD  no diet consistency restrictions;concerns regarding malnutrition;dietary restrictions (e.g. consistent carb, low salt, etc.);esophageal concerns cardiac, consistent carbs, weight loss; GERD/esophagitis  -AD    Plans/Goals Discussed with  other (see comments) pt unable to agree or disagree at this time  -AD  patient;agreed upon  -AD    Barriers to Rehab  medically complex;previous functional deficit  -AD  medically complex;previous functional deficit  -AD     Patient's Goals for Discharge  patient could not state keeps repeating that she is dying  -AD  eat/drink without coughing/choking to find out what is wrong with her  -AD       Pain    Additional Documentation  -- no pain reported or indicated  -AD  -- no current pain reported  -AD       Oral Motor Structure and Function    Oral Lesions or Structural Abnormalities and/or variants  --  Pt with erosion/decay of lower teeth with some being broken off of at the level of the gums. All lower molars are absent. Upper denture plate in place with fair to good fit. No lesions noted.   -AD    Dentition Assessment  --  upper dentures/partial in place;missing teeth;teeth are in poor condition  -AD    Secretion Management  --  WNL/WFL  -AD    Mucosal Quality  --  moist, healthy  -AD    Volitional Swallow  --  WFL  -AD    Volitional Cough  --  WFL  -AD       Oral Musculature and Cranial Nerve Assessment    Oral Motor General Assessment  --  generalized oral motor weakness;oral labial or buccal impairment;lingual impairment;vocal impairment  -AD    Oral Labial or Buccal Impairment, Detail, Cranial Nerve VII (Facial):  --  right labial droop chronic from Bell's Palsy  -AD    Lingual Impairment, Detail. Cranial Nerves IX, XII (Glossopharyngeal and Hypoglossal)  --  reduced lingual ROM deviates to rt w/protrusion; decreased elevation; gen wkns  -AD    Vocal Impairment, Detail. Cranial Nerve X (Vagus)  --  vocal quality abnormality (see comments) hoarse vocal quality; no change w/po intake  -AD       General Eating/Swallowing Observations    Respiratory Support Currently in Use  nasal cannula  -AD  nasal cannula  -AD    Eating/Swallowing Skills  fed by SLP  -AD  self-fed;appropriate self-feeding skills observed  -AD    Positioning During Eating  upright 90 degree;upright in chair;other (see comments) pt frequently moving in chair  -AD  upright 90 degree;upright in chair  -AD    Utensils Used  --  spoon;straw  -AD    Consistencies  Trialed  --  regular textures;pureed;thin liquids  -AD       Respiratory    Respiratory Status  WFL;during swallowing/eating  -AD  WFL;room air;during swallowing/eating  -AD       Clinical Swallow Eval    Oral Prep Phase  --  impaired  -AD    Oral Transit  --  impaired  -AD    Oral Residue  --  WFL  -AD    Pharyngeal Phase  --  no overt signs/symptoms of pharyngeal impairment  -AD    Esophageal Phase  --  suspected esophageal impairment  -AD    Clinical Swallow Evaluation Summary  --  Pt presents with a mild oropharyngeal dysphagia with no overt s/s of aspiration on repeated trials of thins from straw, puree or solids. Pt with complaints of both puree and greater with solids sticking in the throat around the area of the cervical esophagus. Somewhat relieved with thin liquid wash. This did not elicit any type of choking or coughing during the evaluation. Limited po intake overall with reports of poor appetite. Pt demonstrates a hoarse vocal quality throughout the evaluation with no changes in vocal quality during po intake.   -AD       Oral Prep Concerns    Oral Prep Concerns  --  increased prep time  -AD    Increased Prep Time  --  regular consistencies  -AD    Oral Prep Concerns, Comment  --  Increased prep time for solids due to poor dentition. Primarily uses front teeth to chew.   -AD       Oral Transit Concerns    Oral Transit Concerns  --  other (see comments)  -AD    Oral Transit Concerns, Comment  --  History of decreased bolus control and pooling of material in the vallecule and thins to the pyriform as noted on most recent MBS in March 2021.   -AD       Esophageal Phase Concerns    Esophageal Phase Concerns  --  sensation of material sticking  -AD    Sensation of Material Sticking  --  pudding;regular consistencies  -AD    Esophageal Phase Concerns, Comment  --  Pt with c/o food sticking in the throat around the area of the upper cervical esophagus just below the larynx. Hx of esophageal dysphagia.   -AD        MBS/VFSS    Utensils Used  spoon;straw  -AD  --    Consistencies Trialed  regular textures;pureed;thin liquids;nectar/syrup-thick liquids;honey-thick liquids pt spit out solid consistency  -AD  --       MBS/VFSS Interpretation    Oral Prep Phase  WFL;other (see comments) unable to assess with solids; occasional oral holding   -AD  --    Oral Transit Phase  impaired  -AD  --    Oral Residue  WFL  -AD  --    VFSS Summary  Pt presents with a mild oropharyngeal dysphagia with mild decreased oral prep with holding inconsistently and tongue pumping on nectar. Feel oral phase issues are more cognitive in nature than due to physiological deficits. Pt w/mild pharyngeal phase with mild delay of swallow with nectar, honey and puree reaching the valleculae and thins reaching the pyriforms prior to initiation of the swallow. Pt with decreased vestibular closure noted at the onset of the swallow but full closure during the height of the swallow. No penetration or aspiration was viewed and no significant oral or pharyngeal residue after the swallow. Limited trials and limitations in imaging due to pt moving during the study. Unable to follow directions very well during the study due to increase in confusion or increase in depression/anxiety.  -AD  --    Oral Phase, Comment  Mild oral dysphagia with pt spitting out solids and mild tongue pumping noted on trials of nectar thick. It is unclear if the tongue pumping was related to oral dysfunction or if due to the patient not wanting to take the contrast trials. Oral holding also noted on occasion.   -AD  --       Oral Transit Phase    Impaired Oral Transit Phase  tongue pumping  -AD  --    Tongue Pumping  other (see comments)  -AD  --    Oral Transit Phase, Comment  Tongue pumping noted on spoon size trial of thins. May be due to confused state and not wanting to take barium contrast. Not noted on other trials. Unable to assess solids as pt spit out.   -AD  --       Initiation of  Pharyngeal Swallow    Initiation of Pharyngeal Swallow  bolus in valleculae;bolus in pyriform sinuses  -AD  --    Pharyngeal Phase  impaired pharyngeal phase of swallowing  -AD  --    Rosenbek's Scale  thin:;nectar:;honey:;pudding/puree:;1--->level 1  -AD  --    Pharyngeal Phase, Comment  Pt presents with a mild pharyngeal dysphagia with noted delay of swallow on all trials and consistency successfully tested. Nectar, honey and puree demonstrated pooling in the valleculae during the swallow. Thins pooled to the level of the pyriforms before the swallow. Decreased vestibular closure noted during the swallow but full closure obtained by the end of the pharyngeal phase. No definitive penetration or aspiration was viewed. Limited images due to decreased cooperation and confusion. Unable to assess solids due to pt manually removing stating it was 'making me sick'.   -AD  --       Esophageal Phase    Esophageal Phase, Comment  Unable to evaluate esophageal phase due to decreased cooperation ability to hold still during exam.   -AD  --       Clinical Impression    Daily Summary of Progress (SLP)  progress toward functional goals as expected;prepare for discharge  -AD  --    Barriers to Overall Progress (SLP)  increasing confusion and anxiety/depression  -AD  --    SLP Swallowing Diagnosis  mild;oral dysphagia;pharyngeal dysphagia  -AD  mild;oral dysphagia;pharyngeal dysphagia;esophageal dysphagia  -AD    Functional Impact  risk of malnutrition;risk of dehydration  -AD  risk of aspiration/pneumonia;risk of malnutrition  -AD    Rehab Potential/Prognosis, Swallowing  --  adequate, monitor progress closely  -AD    Swallow Criteria for Skilled Therapeutic Interventions Met  baseline status  -AD  demonstrates skilled criteria  -AD    Plan for Continued Treatment (SLP)  Completed MBS. No further SLP is recommended at this time. Pt is at her baseline swallowing status. Recommend further evaluation of the esophagus if needed to  improve her clinical picture and when pt able to fully participate.   -AD  --       Recommendations    Therapy Frequency (Swallow)  evaluation only  -AD  PRN  -AD    Predicted Duration Therapy Intervention (Days)  --  1 week;until discharge  -AD    SLP Diet Recommendation  soft textures;ground;thin liquids;nutritional supplements needed  -AD  soft textures;ground;thin liquids;nutritional supplements needed  -AD    Recommended Diagnostics  --  VFSS (MBS)  -AD    Recommended Precautions and Strategies  upright posture during/after eating;small bites of food and sips of liquid;multiple swallows per bite of food;alternate between small bites of food and sips of liquid;hard swallow with each bite or sip;general aspiration precautions;reflux precautions;fatigue precautions  -AD  upright posture during/after eating;small bites of food and sips of liquid;multiple swallows per bite of food;alternate between small bites of food and sips of liquid;hard swallow with each bite or sip;general aspiration precautions;reflux precautions;fatigue precautions  -AD    Oral Care Recommendations  Oral Care before breakfast, after meals and PRN  -AD  Oral Care before breakfast, after meals and PRN  -AD    SLP Rec. for Method of Medication Administration  meds whole;with thin liquids;as tolerated 1 at a time; no greater or equal to 13mm in size  -AD  meds whole;with thin liquids;as tolerated 1 at a time; no greater or equal to 13 mm in size  -AD    Monitor for Signs of Aspiration  notify SLP if any concerns  -AD  yes;notify SLP if any concerns  -AD    Anticipated Discharge Disposition (SLP)  unknown  -AD  unknown home w/home health vs SNF  -AD    Patient/Family Concerns, Anticipated Discharge Disposition (SLP)  Pt concerned about dying. Difficult to console during procedure.   -AD  Pt continued concern about pneumonia and swallowing difficulty. Agreeable to further assessment via MBS.  -AD    Demonstrates Need for Referral to Another  Service  /spiritual care;psychology services  -AD  --       Swallow Goals (SLP)    Oral Nutrition/Hydration Goal Selection (SLP)  --  oral nutrition/hydration, SLP goal 1  -AD       Oral Nutrition/Hydration Goal 1 (SLP)    Oral Nutrition/Hydration Goal 1, SLP  Pt will be able to participate in further instrumental assessment via MBS in order  to determine least restrictive, safe diet in 3 days.  -AD  Pt will be able to participate in further instrumental assessment via MBS in order  to determine least restrictive, safe diet in 3 days.  -AD    Time Frame (Oral Nutrition/Hydration Goal 1, SLP)  short term goal (STG);3 days  -AD  short term goal (STG);3 days  -AD    Barriers (Oral Nutrition/Hydration Goal 1, SLP)  medically complex  -AD  medically complex  -AD    Progress/Outcomes (Oral Nutrition/Hydration Goal 1, SLP)  goal met completed 8/9/21  -AD  goal ongoing ordered; plan to complete on 8/9/21.   -AD      User Key  (r) = Recorded By, (t) = Taken By, (c) = Cosigned By    Initials Name Effective Dates    Dahiana Riddle MS CCC-SLP 06/16/21 -           EDUCATION  The patient has been educated in the following areas:   Dysphagia (Swallowing Impairment) Modified Diet Instruction.  Pt is unable to demonstrate understanding of teaching or results of evaluation at this time.       SLP GOALS     Row Name 08/09/21 1010 08/07/21 1125          Oral Nutrition/Hydration Goal 1 (SLP)    Oral Nutrition/Hydration Goal 1, SLP  Pt will be able to participate in further instrumental assessment via MBS in order  to determine least restrictive, safe diet in 3 days.  -AD  Pt will be able to participate in further instrumental assessment via MBS in order  to determine least restrictive, safe diet in 3 days.  -AD     Time Frame (Oral Nutrition/Hydration Goal 1, SLP)  short term goal (STG);3 days  -AD  short term goal (STG);3 days  -AD     Barriers (Oral Nutrition/Hydration Goal 1, SLP)  medically complex  -AD  medically  complex  -AD     Progress/Outcomes (Oral Nutrition/Hydration Goal 1, SLP)  goal met completed 8/9/21  -AD  goal ongoing ordered; plan to complete on 8/9/21.   -AD       User Key  (r) = Recorded By, (t) = Taken By, (c) = Cosigned By    Initials Name Provider Type    Dahiana Riddle MS CCC-SLP Speech and Language Pathologist             Time Calculation:   Time Calculation- SLP     Row Name 08/09/21 1348             Time Calculation- SLP    SLP Start Time  1010  -AD      SLP Stop Time  1130  -AD      SLP Time Calculation (min)  80 min  -AD      Total Timed Code Minutes- SLP  0 minute(s)  -AD      SLP Non-Billable Time (min)  0 min  -AD      SLP Received On  08/09/21  -AD         Untimed Charges    SLP Eval/Re-eval   ST Motion Fluoro Eval Swallow - 03604  -AD      39108-DW Motion Fluoro Eval Swallow Minutes  80  -AD         Total Minutes    Untimed Charges Total Minutes  80  -AD       Total Minutes  80  -AD        User Key  (r) = Recorded By, (t) = Taken By, (c) = Cosigned By    Initials Name Provider Type    Dahiana Riddle MS CCC-SLP Speech and Language Pathologist          Therapy Charges for Today     Code Description Service Date Service Provider Modifiers Qty    83078179994 HC ST MOTION FLUORO EVAL SWALLOW 5 8/9/2021 Dahiana Villa MS CCC-SLP GN 1               Dahiana Villa MS CCC-SLP  8/9/2021

## 2021-08-09 NOTE — PROGRESS NOTES
[facility] MED SURG    8/9/2021    Patient ID:  Name:  Joya Singh  MRN:  [id number]  1940  81 y.o.  female            CC/Reason for visit: Recurrent pneumonia    Interval hx: on RA this morning, was on some oxygen last night. Very slow responses. Reports she feels funny.     ROS: No chest pain, no fever, no abdominal pain    Vitals:  Vitals:    08/08/21 2214 08/09/21 0500 08/09/21 0748 08/09/21 1049   BP:  166/65  151/71   BP Location:  Right arm  Right arm   Patient Position:  Lying  Sitting   Pulse:  65  69   Resp:  16  18   Temp:  98 °F (36.7 °C)  97.5 °F (36.4 °C)   TempSrc:  Oral  Axillary   SpO2: 92% 94%  93%   Weight:   85.1 kg (187 lb 11.2 oz)    Height:               Body mass index is 35.47 kg/m².    Intake/Output Summary (Last 24 hours) at 8/9/2021 1241  Last data filed at 8/9/2021 1002  Gross per 24 hour   Intake 720 ml   Output --   Net 720 ml       Exam:  GEN:  No distress  Alert, oriented x 3.   LUNGS: A few coarse breath sounds but overall clear breath sounds bilat, no use of accessory muscles  CV:  Normal S1S2, without murmur, trace ankle edema  ABD:  Non tender, obese  Skin: no rashes      Scheduled meds:  [START ON 8/10/2021] amiodarone, 200 mg, Oral, Q24H  apixaban, 2.5 mg, Oral, Q12H  atorvastatin, 10 mg, Oral, Daily  desvenlafaxine, 50 mg, Oral, Daily  famotidine, 40 mg, Oral, Daily  gabapentin, 100 mg, Oral, QAM  gabapentin, 200 mg, Oral, Nightly  guaiFENesin, 1,200 mg, Oral, BID  insulin aspart, 0-7 Units, Subcutaneous, TID AC  insulin detemir, 20 Units, Subcutaneous, Nightly  levothyroxine, 88 mcg, Oral, Daily  midodrine, 2.5 mg, Oral, BID AC  nystatin, , Topical, Q12H  O2, 2 L/min, Inhalation, Nightly  pantoprazole, 40 mg, Oral, QAM  sennosides-docusate, 2 tablet, Oral, Daily  sodium chloride, 10 mL, Intravenous, Q12H      IV meds:                           Data Review:   I reviewed the patient's medications and new clinical results.    COVID19   Date Value Ref Range  Status   08/06/2021 Not Detected Not Detected - Ref. Range Final         Lab Results   Component Value Date    CALCIUM 9.0 08/09/2021    PHOS 3.6 08/09/2021    MG 2.3 08/06/2021    MG 2.1 07/29/2020    MG 2.1 10/25/2018     Results from last 7 days   Lab Units 08/09/21  0503 08/08/21  0359 08/07/21  0509 08/06/21  0603 08/06/21 0603   SODIUM mmol/L 139  --  137  --  136   POTASSIUM mmol/L 5.1  --  4.9  --  4.9   CHLORIDE mmol/L 108*  --  105  --  102   CO2 mmol/L 24.0  --  26.2  --  26.2   BUN mg/dL 42*  --  52*  --  48*   CREATININE mg/dL 2.21* 2.84* 3.18*   < > 2.68*   CALCIUM mg/dL 9.0  --  8.6  --  9.1   BILIRUBIN mg/dL  --   --   --   --  0.7   ALK PHOS U/L  --   --   --   --  169*   ALT (SGPT) U/L  --   --   --   --  16   AST (SGOT) U/L  --   --   --   --  18   GLUCOSE mg/dL 213*  --  114*  --  96   WBC 10*3/mm3 3.83  --  5.72  --  8.29   HEMOGLOBIN g/dL 9.6*  --  9.3*  --  11.3*   PLATELETS 10*3/mm3 237  --  235  --  290   INR   --   --   --   --  1.58*   PROBNP pg/mL  --   --   --   --  3,842.0*   PROCALCITONIN ng/mL 0.54*  --  1.04*  --  0.71*    < > = values in this interval not displayed.     Results from last 7 days   Lab Units 08/06/21  1427 08/06/21  0740 08/06/21 0603   BLOODCX   --  No growth at 3 days No growth at 3 days   MRSACX  No Methicillin Resistant Staphylococcus aureus isolated  --   --            ASSESSMENT:     Atrial flutter (CMS/HCC)    Moderate malnutrition (CMS/HCC)  Recurrent aspiration pneumonia  GERD    PLAN:  - continue to hold abx, will take a little for her lungs to fully heal  - limit sedating medications as much as possible, she seemed a little out of it this morning.   - have patient sit up when eating, continue ppi,   - agree with swallow study today  - Increase her mobility and continue to follow the patient clinically, aggressive IS and pulm toilet to prevent atelectasis as she is at high risk  - nocturnal desats are expected as pt with likely cindi and should get follow up  sleep study outpt     Will follow.     Max Fowler MD  8/9/2021

## 2021-08-09 NOTE — CASE MANAGEMENT/SOCIAL WORK
Continued Stay Note  VICKY Sharma     Patient Name: Joya Singh  MRN: [id number]  Today's Date: 8/9/2021    Admit Date: 8/6/2021    Discharge Plan     Row Name 08/09/21 1442       Plan    Plan  Home with son and [facility]    Plan Comments  Followed up with patient today to review discharge plans. Patient is currently resting up in the chair with no complaints. Patient states she plans on returning home with her son and family and is current with [facility]. She denies any discharge needs at this time. Patient had no other questions or concerns regarding discharge plans. CM will continue to follow for needs.        Discharge Codes    No documentation.             Luann Catalan RN

## 2021-08-09 NOTE — PLAN OF CARE
Goal Outcome Evaluation:              Outcome Summary: PT: Patient confused throughout session with minimal verbal responses noted.  Patient did not provide answer to any orientation questions and frequently stares at therapist without engaging in conversation or responding.  Patient requires min assist for supine to sit transfer and CGA for sit to stand transfer.  Patient performs gait with rolling walker x5 feet with CGA requiring cues for safety and sequencing.  Patient declines further gait distance despite encouragement.  At this time, would recommend 24 hour assistance if patient returns home.  If this level of support is not available, would recommend SNF with ability to transition to ECF if current cognition does not improve.

## 2021-08-09 NOTE — PLAN OF CARE
Goal Outcome Evaluation:  Plan of Care Reviewed With: patient, other (see comments) (MD, Dr. Gzuman; RN, Peyton)           Outcome Summary: SLP/MBS: Pt presents with a mild oropharyngeal dysphagia. Very limited study due to pt w/decreased cooperation and confusion. No gross aspiration or penetation viewed on limited trials of thins, nectar, honey and puree. Pt refused sold and manually spit out. Delay of swallow noted on nectar to the level of the valleculae and to the pyriforms on trials of thins. Discoordinated lingual movement  but unable to state if this is baseline or due to confusion and pt not wanting to take any of the trials. Decreased vestibular closure noted at the beginning of the initiation of the pharyngeal swallow due to delay. No oral or significant pharyngeal residue after the swallow. Unable to complete esophageal screen at this time due to poor cooperation overall and difficulty getting pt to remain still during the images. Recommend to continue with GI soft diet with thins and aspiration/reflux precautions. No further dysphagia therapy indicated at this time. May benefit from spiritual or psych intervention due to reports of depression and stating she is going to die frequently.

## 2021-08-09 NOTE — PLAN OF CARE
Goal Outcome Evaluation:  Plan of Care Reviewed With: patient        Progress: no change  Outcome Summary: VSS without report of SOB or chest pain. Pt was lying in bed in the dark just looking around. checked on her and she was very confused! unaware of where she was or who kept calling on her cell phone. it was her son. She became tearful and afraid and wanted to be fixed to please help her. Reassured and comforted, she went to sleep quietly.

## 2021-08-09 NOTE — PROGRESS NOTES
"Hospitalist Team      Patient Care Team:  Chari Mercado MD as PCP - General  Chari Mercado MD as PCP - Family Medicine  Christos Rob MD as Surgeon (General Surgery)  German Wylie MD as Surgeon (Orthopedic Surgery)  Yasmani Baugh MD as Consulting Physician (Nephrology)  Yasmani Baugh MD as Referring Physician (Nephrology)  Elieser Headley MD as Consulting Physician (Hematology and Oncology)      Chief Complaint: Follow-up Resp Failure    Subjective    Events noted overnight and this morning.  This afternoon she is somnolent, but appropriate.  She denies chest pain and dyspnea.  She is tolerating some PO.  Not much in the way of ambulation.    Objective    Vital Signs  Temp:  [97.5 °F (36.4 °C)-98 °F (36.7 °C)] 97.6 °F (36.4 °C)  Heart Rate:  [65-69] 65  Resp:  [16-18] 17  BP: (139-166)/(57-71) 147/61  Oxygen Therapy  SpO2: 94 %  Pulse Oximetry Type: Intermittent  Device (Oxygen Therapy): room air  Flow (L/min): 2}  Flowsheet Rows      First Filed Value   Admission Height  154.9 cm (61\") Documented at 08/06/2021 0600   Admission Weight  87 kg (191 lb 12.8 oz) Documented at 08/06/2021 0600          Physical Exam:    General: Appears stated age in no acute distress.  HEENT: PERRL  Lungs: Breath sounds are diminished.  Breathing is non-labored.  CV: Heart sounds are distant but regular.  No murmur appreciated.  Radial pulses are 2+ and symmetric.  Abdomen: Obese, soft, and non-tender w/ active bowel sounds.  MSK: No C/C/E.  No asymmetry.  Neuro: CN II-XII grossly intact.  Psych: Normal affect.  Ox2.    Results Review:     I reviewed the patient's new clinical results.    Lab Results (last 24 hours)     Procedure Component Value Units Date/Time    POC Glucose Once [[id number]]  (Abnormal) Collected: 08/09/21 1120    Specimen: Blood Updated: 08/09/21 1136     Glucose 224 mg/dL      Comment: Meter: [id number] : [id number] Juvencio DICK       CBC & Differential " [[id number]]  (Abnormal) Collected: 08/09/21 0503    Specimen: Blood Updated: 08/09/21 0828    Narrative:      The following orders were created for panel order CBC & Differential.  Procedure                               Abnormality         Status                     ---------                               -----------         ------                     CBC Auto Differential[[id number]]        Abnormal            Final result               Scan Slide[[id number]]                                       Final result                 Please view results for these tests on the individual orders.    Scan Slide [[id number]] Collected: 08/09/21 0503    Specimen: Blood Updated: 08/09/21 0828     Anisocytosis Slight/1+     Poikilocytes Slight/1+     WBC Morphology Normal     Platelet Morphology Normal    Procalcitonin [[id number]]  (Abnormal) Collected: 08/09/21 0503    Specimen: Blood Updated: 08/09/21 0814     Procalcitonin 0.54 ng/mL     Narrative:      Results may be falsely decreased if patient taking Biotin.     Blood Culture - Blood, Hand, Left [[id number]] Collected: 08/06/21 0740    Specimen: Blood from Hand, Left Updated: 08/09/21 0745     Blood Culture No growth at 3 days    POC Glucose Once [[id number]]  (Abnormal) Collected: 08/09/21 0731    Specimen: Blood Updated: 08/09/21 0742     Glucose 162 mg/dL      Comment: Meter: [id number] : [id number] Juvencio DICK       CBC Auto Differential [[id number]]  (Abnormal) Collected: 08/09/21 0503    Specimen: Blood Updated: 08/09/21 0722     WBC 3.83 10*3/mm3      RBC 3.17 10*6/mm3      Hemoglobin 9.6 g/dL      Hematocrit 30.8 %      MCV 97.2 fL      MCH 30.3 pg      MCHC 31.2 g/dL      RDW 20.9 %      RDW-SD 73.0 fl      MPV 12.4 fL      Platelets 237 10*3/mm3      Neutrophil % 53.3 %      Lymphocyte % 25.6 %      Monocyte % 4.2 %      Eosinophil % 13.3 %      Basophil % 2.3 %      Immature Grans % 1.3 %      Neutrophils, Absolute 2.04 10*3/mm3      Lymphocytes, Absolute  0.98 10*3/mm3      Monocytes, Absolute 0.16 10*3/mm3      Eosinophils, Absolute 0.51 10*3/mm3      Basophils, Absolute 0.09 10*3/mm3      Immature Grans, Absolute 0.05 10*3/mm3      nRBC 0.0 /100 WBC     Blood Culture - Blood, Arm, Right [[id number]] Collected: 08/06/21 0603    Specimen: Blood from Arm, Right Updated: 08/09/21 0715     Blood Culture No growth at 3 days    Renal Function Panel [[id number]]  (Abnormal) Collected: 08/09/21 0503    Specimen: Blood Updated: 08/09/21 0528     Glucose 213 mg/dL      BUN 42 mg/dL      Creatinine 2.21 mg/dL      Sodium 139 mmol/L      Potassium 5.1 mmol/L      Chloride 108 mmol/L      CO2 24.0 mmol/L      Calcium 9.0 mg/dL      Albumin 3.10 g/dL      Phosphorus 3.6 mg/dL      Anion Gap 7.0 mmol/L      BUN/Creatinine Ratio 19.0     eGFR Non African Amer 21 mL/min/1.73     Narrative:      GFR Normal >60  Chronic Kidney Disease <60  Kidney Failure <15            Imaging Results (Last 24 Hours)     Procedure Component Value Units Date/Time    FL Video Swallow With Speech Single Contrast [[id number]] Collected: 08/09/21 1155     Updated: 08/09/21 1158    Narrative:      VIDEO SWALLOWING STUDY, 08/09/2021     HISTORY:  Recurrent aspiration pneumonia. Unable to 8.     TECHNIQUE:  Video swallowing study was conducted by the speech pathologist in my  presence and recorded on digital video disc.     Fluoroscopy time 1.5 minutes. A single spot image was recorded for  documentation purposes.     FINDINGS:  Postop changes in the lower cervical spine related to anterior fusion.  These were the best images possible. No distinct evidence of penetration  or aspiration with thin or thick consistencies. The patient declined  solid consistency. Please see the full report of the speech pathologist  for further details and dietary recommendations.       Impression:      1. No penetration or aspiration. Limited study.     This report was finalized on 8/9/2021 11:56 AM by Dr. Navarro Smalls MD.              Xray reviewed personally by physician.      Medication Review:   I have reviewed the patient's current medication list    Current Facility-Administered Medications:   •  acetaminophen (TYLENOL) tablet 650 mg, 650 mg, Oral, Q4H PRN **OR** acetaminophen (TYLENOL) 160 MG/5ML solution 650 mg, 650 mg, Oral, Q4H PRN **OR** acetaminophen (TYLENOL) suppository 650 mg, 650 mg, Rectal, Q4H PRN, Serafin Alvarado, DO  •  albuterol (PROVENTIL) nebulizer solution 0.083% 2.5 mg/3mL, 2.5 mg, Nebulization, Q6H PRN, Serafin Alvarado DO  •  [START ON 8/10/2021] amiodarone (PACERONE) tablet 200 mg, 200 mg, Oral, Q24H, Neetu Chapin MD  •  apixaban (ELIQUIS) tablet 2.5 mg, 2.5 mg, Oral, Q12H, Serafin Alvarado DO, 2.5 mg at 08/09/21 0859  •  atorvastatin (LIPITOR) tablet 10 mg, 10 mg, Oral, Daily, Serafin Alvarado DO, 10 mg at 08/09/21 0851  •  desvenlafaxine (PRISTIQ) 24 hr tablet 50 mg, 50 mg, Oral, Daily, Serafin Alvarado DO, 50 mg at 08/09/21 1046  •  dextrose (D50W) 25 g/ 50mL Intravenous Solution 25 g, 25 g, Intravenous, Q15 Min PRN, Serafin Alvarado,   •  dextrose (GLUTOSE) oral gel 15 g, 15 g, Oral, Q15 Min PRN, Serafin Alvarado DO  •  famotidine (PEPCID) tablet 40 mg, 40 mg, Oral, Daily, Serafin Alvarado DO  •  gabapentin (NEURONTIN) capsule 100 mg, 100 mg, Oral, QAM, Serafin Alvarado DO, 100 mg at 08/09/21 1046  •  gabapentin (NEURONTIN) capsule 200 mg, 200 mg, Oral, Nightly, Serafin Alvarado DO, 200 mg at 08/08/21 2208  •  glucagon (GLUCAGEN) injection 1 mg, 1 mg, Subcutaneous, Q15 Min PRN, Serafin Alvarado, DO  •  guaiFENesin (MUCINEX) 12 hr tablet 1,200 mg, 1,200 mg, Oral, BID, Serafin Alvarado, , 1,200 mg at 08/08/21 2209  •  insulin aspart (novoLOG) injection 0-7 Units, 0-7 Units, Subcutaneous, TID AC, Serafin Alvarado, , 3 Units at 08/09/21 1242  •  insulin detemir (LEVEMIR) injection 20 Units, 20 Units, Subcutaneous, Nightly, Serafin Alvarado, DO, 20  Units at 08/08/21 2205  •  levothyroxine (SYNTHROID, LEVOTHROID) tablet 88 mcg, 88 mcg, Oral, Daily, Serafin Alvarado DO, 88 mcg at 08/09/21 0852  •  melatonin tablet 5 mg, 5 mg, Oral, Nightly PRN, Serafin Alvarado DO  •  midodrine (PROAMATINE) tablet 2.5 mg, 2.5 mg, Oral, BID AC, Neetu Chapin MD  •  nitroglycerin (NITROSTAT) SL tablet 0.4 mg, 0.4 mg, Sublingual, Q5 Min PRN, Serafin Alvarado DO  •  nystatin (MYCOSTATIN) powder, , Topical, Q12H, Serafin Alvarado DO, Given at 08/09/21 0903  •  O2 (OXYGEN), 2 L/min, Inhalation, Nightly, Serafin Alvarado DO, 2 L/min at 08/08/21 2210  •  ondansetron (ZOFRAN) tablet 4 mg, 4 mg, Oral, Q6H PRN **OR** ondansetron (ZOFRAN) injection 4 mg, 4 mg, Intravenous, Q6H PRN, Serafin Alvarado DO, 4 mg at 08/09/21 0900  •  ondansetron (ZOFRAN) tablet 8 mg, 8 mg, Oral, TID PRN, Serafin Alvarado DO  •  pantoprazole (PROTONIX) EC tablet 40 mg, 40 mg, Oral, QAM, Serafin Alvarado DO, 40 mg at 08/09/21 0851  •  sennosides-docusate (PERICOLACE) 8.6-50 MG per tablet 2 tablet, 2 tablet, Oral, Daily, Serafin Alvarado DO, 2 tablet at 08/08/21 0840  •  sodium chloride 0.9 % flush 10 mL, 10 mL, Intravenous, PRN, Serafin Alvarado DO, 10 mL at 08/06/21 1411  •  sodium chloride 0.9 % flush 10 mL, 10 mL, Intravenous, Q12H, Serafin Alvarado DO, 10 mL at 08/09/21 0901  •  sodium chloride 0.9 % infusion 40 mL, 40 mL, Intravenous, PRN, Serafin Alvarado DO    Facility-Administered Medications Ordered in Other Encounters:   •  acetaminophen (TYLENOL) tablet 650 mg, 650 mg, Oral, Q6H PRN, Elieser Headley MD  •  diphenhydrAMINE (BENADRYL) capsule 25 mg, 25 mg, Oral, Once, Elieser Headley MD  •  lactated ringers infusion, 100 mL/hr, Intravenous, Continuous, Serafin Ferreira CRNA  •  ondansetron (ZOFRAN) injection 4 mg, 4 mg, Intravenous, Once PRN, Serafin Ferreira CRNA      Assessment/Plan     1.  New-onset afib/flutter: Regular on exam.   Cardiology following.    2.  JOYCE on CKD IV: Creatinine nearly back to baseline.    3.  Confusion: She remembers me appropriately this afternoon and is oriented to place and self.  Change noted to narcotic.    Plan for disposition: Anticipate discharge in AM.    Elton Guzman MD  08/09/21  16:39 EDT

## 2021-08-09 NOTE — THERAPY TREATMENT NOTE
Acute Care - Physical Therapy Treatment Note  VICKY Sharma     Patient Name: Joya Singh  : 1940  MRN: [id number]  Today's Date: 2021   Onset of Illness/Injury or Date of Surgery: 21  Visit Dx:     ICD-10-CM ICD-9-CM   1. Atrial flutter, unspecified type (CMS/[facility])  I48.92 427.32   2. Pneumonia of left lower lobe due to infectious organism  J18.9 486   3. JOYCE (acute kidney injury) (CMS/[facility])  N17.9 584.9     Patient Active Problem List   Diagnosis   • Arthritis   • Chronic back pain   • Chronic anemia   • Anxiety and depression   • Type 2 diabetes mellitus with neurologic complication (CMS/[facility])   • Brittle diabetes mellitus (CMS/[facility])   • Dizzy   • Hyperlipidemia   • Hypertension   • Insomnia with sleep apnea   • Obstructive sleep apnea syndrome   • Peripheral neuropathy   • Diabetic gastroparesis (CMS/[facility])   • Primary localized osteoarthrosis of left shoulder region   • Rotator cuff tendonitis   • Acquired hypothyroidism   • Anxiety   • History of biliary T-tube placement   • CKD stage 3 due to type 2 diabetes mellitus (CMS/[facility])   • Complex tear of medial meniscus of right knee as current injury   • Insufficiency fracture of tibia   • Primary osteoarthritis of left knee   • Orthostatic hypotension   • Tendinitis of right rotator cuff   • AC joint arthropathy   • Subacromial impingement of right shoulder   • Right carpal tunnel syndrome   • Anemia requiring transfusions   • Monoclonal gammopathy of unknown significance (MGUS)   • Myelodysplastic syndrome with 5q deletion (CMS/[facility])   • History of deep venous thrombosis (DVT) of distal vein of left lower extremity   • Moderate episode of recurrent major depressive disorder (CMS/[facility])   • Chronic diastolic CHF (congestive heart failure) (CMS/[facility])   • Knee pain   • Primary osteoarthritis of right knee   • Iron overload, transfusional   • Uncontrolled type 2 diabetes mellitus with hyperglycemia (CMS/[facility])   • Localized edema   • Type 2 diabetes mellitus with  diabetic neuropathy (CMS/H[facility])   • Vitamin D deficiency   • Subacromial bursitis of left shoulder joint   • Gastroesophageal reflux disease with esophagitis without hemorrhage   • Esophageal dysphagia   • Anemia in neoplastic disease   • Leukocytes in urine   • Atrial flutter (CMS/[facility])   • Moderate malnutrition (CMS/HCC)     Past Medical History:   Diagnosis Date   • Allergic rhinitis    • Anemia    • Anxiety    • Appetite absent    • Arthritis    • Asthma    • Back pain    • Bell's palsy    • Black tarry stools    • Blood in stool    • Chronic fatigue    • CKD (chronic kidney disease) stage 3, GFR 30-59 ml/min (CMS/[facility])    • Community acquired pneumonia of left lung 10/25/2018   • Cough    • Depression    • Diabetes mellitus (CMS/[facility])     LAST A1C 6   • Diabetic gastroparesis (CMS/[facility]) 2/19/2016   • Difficulty walking    • Excessive urination at night    • Frequent urination    • GERD (gastroesophageal reflux disease)    • GI bleed    • Gout    • H/O blood clots     LEFT LEG 7 OR 8 YEARS AGO   • Heat intolerance    • History of fall 10/2018   • History of prior pregnancies     x8, miscarriage 5   • History of transfusion 11/2018    due to anemia   • Hyperlipidemia    • Hypertension    • Hypothyroidism    • Normal coronary arteries     by cath 2013   • Orthostatic hypotension    • AARON (obstructive sleep apnea)     DOESNT WEAR REGULARLY   • Pneumonia    • PONV (postoperative nausea and vomiting)    • Skin cancer    • Stroke (CMS/[facility])     Several mini-strokes   • TIA (transient ischemic attack)     LAST TIA JULY 2017   • Urination pain    • UTI (urinary tract infection)     Dec 2018 and Jan 2019     Past Surgical History:   Procedure Laterality Date   • BACK SURGERY      HARDWARE   • CHOLECYSTECTOMY      OPEN   • COLONOSCOPY  2011    due for repeat in 2021   • COLONOSCOPY N/A 10/28/2018    Procedure: COLONOSCOPY;  Surgeon: Emmanuel Rogers MD;  Location: [facility];  Service: Gastroenterology   • ENDOSCOPY  N/A 10/26/2018    Procedure: ESOPHAGOGASTRODUODENOSCOPY;  Surgeon: Emmanuel Rogers MD;  Location:  [facility];  Service: Gastroenterology   • ENDOSCOPY N/A 5/13/2021    Procedure: ESOPHAGOGASTRODUODENOSCOPY, biopsy, dilatation;  Surgeon: Emmanuel Rogers MD;  Location:  [facility];  Service: Gastroenterology;  Laterality: N/A;  Esophagitis; Dilation- no stricture; Biopsy- esophagus   • HYSTERECTOMY      PARTIAL    • KNEE SURGERY     • NECK SURGERY     • SPINE SURGERY     • TUMOR REMOVAL Left     Leg   • UPPER GASTROINTESTINAL ENDOSCOPY  2014    gastritis.  done by dr. hastings        PT Assessment (last 12 hours)      PT Evaluation and Treatment     Row Name 08/09/21 0838          Physical Therapy Time and Intention    Subjective Information  no complaints  -     Document Type  therapy note (daily note)  -     Mode of Treatment  physical therapy  -     Patient Effort  fair  -JW     Comment  pt with minimal responses to questions.  frequently stares at therapist and does not engage in conversation   -     Row Name 08/09/21 0838          General Information    Patient Observations  agree to therapy significant encouragement required  -     Patient/Family/Caregiver Comments/Observations  pt supine, requires encouragement for participation  -     Existing Precautions/Restrictions  fall confusion  -     Barriers to Rehab  cognitive status  -     Row Name 08/09/21 0838          Cognition    Orientation Status (Cognition)  -- pt does not provide answers to any orientation questions  -     Personal Safety Interventions  gait belt;nonskid shoes/slippers when out of bed  -     Row Name 08/09/21 0838          Pain Scale: Word Pre/Post-Treatment    Pre/Posttreatment Pain Comment  no c/o pain  -     Row Name 08/09/21 0838          Bed Mobility    Bed Mobility  supine-sit  -     Supine-Sit [place] (Bed Mobility)  minimum assist (75% patient effort);verbal cues  -     Assistive  Device (Bed Mobility)  bed rails;head of bed elevated  -     Comment (Bed Mobility)  requires extended time to complete.  repeated verbal cues   -     Row Name 08/09/21 0838          Transfers    Transfers  sit-stand transfer;stand-sit transfer  -     Comment (Transfers)  cues for hand placement  -     Sit-Stand [place] (Transfers)  contact guard;verbal cues  -     Stand-Sit [place] (Transfers)  contact guard;verbal cues  -     Row Name 08/09/21 0838          Sit-Stand Transfer    Assistive Device (Sit-Stand Transfers)  walker, front-wheeled  -     Row Name 08/09/21 0838          Stand-Sit Transfer    Assistive Device (Stand-Sit Transfers)  walker, front-wheeled  -     Row Name 08/09/21 0838          Gait/Stairs (Locomotion)    Comment (Gait/Stairs)  pt performs gait with rolling walker x5 feet with CGA requiring verbal cues for safety and sequencing.  pt refuses further gait distance  -     Row Name 08/09/21 0838          Safety Issues, Functional Mobility    Safety Issues Affecting Function (Mobility)  insight into deficits/self-awareness;judgment;positioning of assistive device;problem-solving;safety precaution awareness;sequencing abilities  -     Row Name 08/09/21 0838          Plan of Care Review    Outcome Summary  PT: Patient confused throughout session with minimal verbal responses noted.  Patient did not provide answer to any orientation questions and frequently stares at therapist without engaging in conversation or responding.  Patient requires min assist for supine to sit transfer and CGA for sit to stand transfer.  Patient performs gait with rolling walker x5 feet with CGA requiring cues for safety and sequencing.  Patient declines further gait distance despite encouragement.  At this time, would recommend 24 hour assistance if patient returns home.  If this level of support is not available, would recommend SNF with ability to transition to ECF if current cognition does  not improve.  -     Row Name 08/09/21 0838          Positioning and Restraints    Pre-Treatment Position  in bed  -JW     Post Treatment Position  chair  -JW     In Chair  reclined;call light within reach;encouraged to call for assist;notified nsg  -     Row Name 08/09/21 0838          Progress Summary (PT)    Progress Toward Functional Goals (PT)  progress toward functional goals is fair  -     Barriers to Overall Progress (PT)  cognition  -       User Key  (r) = Recorded By, (t) = Taken By, (c) = Cosigned By    Initials Name Provider Type    Steffany Cherry, PT Physical Therapist        Physical Therapy Education                 Title: PT OT SLP Therapies (In Progress)     Topic: Physical Therapy (Done)     Point: Mobility training (Done)     Learning Progress Summary           Patient Acceptance, E,TB, VU by  at 8/9/2021 1129    Acceptance, E,TB, VU,NR by AS at 8/7/2021 1045                               User Key     Initials Effective Dates Name Provider Type Discipline     06/16/21 -  Steffany Noble, PT Physical Therapist PT    AS 06/16/21 -  Yasmani Wilder, PT Physical Therapist PT              PT Recommendation and Plan  Anticipated Discharge Disposition (PT): skilled nursing facility, extended care facility, home with 24/7 care  Progress Summary (PT)  Progress Toward Functional Goals (PT): progress toward functional goals is fair  Barriers to Overall Progress (PT): cognition  Outcome Summary: PT: Patient confused throughout session with minimal verbal responses noted.  Patient did not provide answer to any orientation questions and frequently stares at therapist without engaging in conversation or responding.  Patient requires min assist for supine to sit transfer and CGA for sit to stand transfer.  Patient performs gait with rolling walker x5 feet with CGA requiring cues for safety and sequencing.  Patient declines further gait distance despite encouragement.  At this time, would  recommend 24 hour assistance if patient returns home.  If this level of support is not available, would recommend SNF with ability to transition to ECF if current cognition does not improve.  Outcome Measures     Row Name 08/09/21 0838 08/07/21 1000 08/07/21 0900       How much help from another person do you currently need...    Turning from your back to your side while in flat bed without using bedrails?  3  -  3  -AS  --    Moving from lying on back to sitting on the side of a flat bed without bedrails?  3  -  3  -AS  --    Moving to and from a bed to a chair (including a wheelchair)?  3  -  2  -AS  --    Standing up from a chair using your arms (e.g., wheelchair, bedside chair)?  3  -  3  -AS  --    Climbing 3-5 steps with a railing?  1  -  2  -AS  --    To walk in hospital room?  2  -  2  -AS  --    AM-PAC 6 Clicks Score (PT)  15  -  15  -AS  --       How much help from another is currently needed...    Putting on and taking off regular lower body clothing?  --  --  2  -JJ    Bathing (including washing, rinsing, and drying)  --  --  2  -JJ    Toileting (which includes using toilet bed pan or urinal)  --  --  2  -JJ    Putting on and taking off regular upper body clothing  --  --  4  -JJ    Taking care of personal grooming (such as brushing teeth)  --  --  4  -JJ    Eating meals  --  --  4  -JJ    AM-PAC 6 Clicks Score (OT)  --  --  18  -JJ       Functional Assessment    Outcome Measure Options  AM-PAC 6 Clicks Basic Mobility (PT)  -  AM-PAC 6 Clicks Basic Mobility (PT)  -AS  AM-PAC 6 Clicks Daily Activity (OT)  -      User Key  (r) = Recorded By, (t) = Taken By, (c) = Cosigned By    Initials Name Provider Type    Haleigh Callahan, OTR Occupational Therapist    Steffany Cherry, PT Physical Therapist    AS Yasmani Wilder, PT Physical Therapist           Time Calculation:   PT Charges     Row Name 08/09/21 1129             Time Calculation    Start Time  0838  -      Stop  Time  0847  -      Time Calculation (min)  9 min  -JW      PT Received On  08/09/21  -JW      PT - Next Appointment  08/10/21  -         Timed Charges    85980 - PT Therapeutic Exercise Minutes  9  -JW         Total Minutes    Timed Charges Total Minutes  9  -JW       Total Minutes  9  -JW        User Key  (r) = Recorded By, (t) = Taken By, (c) = Cosigned By    Initials Name Provider Type    Steffany Cherry, CHET Physical Therapist        Therapy Charges for Today     Code Description Service Date Service Provider Modifiers Qty    [id number]  PT THER PROC EA 15 MIN 8/9/2021 Steffany Noble, CHET GP 1          PT G-Codes  Outcome Measure Options: AM-PAC 6 Clicks Basic Mobility (PT)  AM-PAC 6 Clicks Score (PT): 15  AM-PAC 6 Clicks Score (OT): 18    Steffany Noble PT  8/9/2021

## 2021-08-09 NOTE — DISCHARGE INSTR - DIET
Reason for Referral  Patient was referred for a MBS to assess the efficiency of his/her swallow function, rule out aspiration and make recommendations regarding safe dietary consistencies, effective compensatory strategies, and safe eating environment.    Completed on 8/9/2021        Recommendations/Treatment  SLP Swallowing Diagnosis: mild, oral dysphagia, pharyngeal dysphagia  Functional Impact: risk of malnutrition, risk of dehydration  Swallow Criteria for Skilled Therapeutic Interventions Met: baseline status  Therapy Frequency (Swallow): evaluation only  SLP Diet Recommendation: soft textures, ground, thin liquids, nutritional supplements needed  Recommended Precautions and Strategies: upright posture during/after eating, small bites of food and sips of liquid, multiple swallows per bite of food, alternate between small bites of food and sips of liquid, hard swallow with each bite or sip, general aspiration precautions, reflux precautions, fatigue precautions  SLP Rec. for Method of Medication Administration: meds whole, with thin liquids, as tolerated (1 at a time; no greater or equal to 13mm in size)  Monitor for Signs of Aspiration: notify SLP if any concerns  Anticipated Discharge Disposition (SLP): unknown  Demonstrates Need for Referral to Another Service: /spiritual care, psychology services    Instrumental Set-up  Utensils Used: spoon, straw  Consistencies Trialed: regular textures, pureed, thin liquids, nectar/syrup-thick liquids, honey-thick liquids (pt spit out solid consistency)    Oral Preparation/ Oral Phase  Oral Prep Phase: WFL, other (see comments) (unable to assess with solids; occasional oral holding )  Oral Transit Phase: impaired  Oral Residue: WFL  Oral Phase, Comment: Mild oral dysphagia with pt spitting out solids and mild tongue pumping noted on trials of nectar thick. It is unclear if the tongue pumping was related to oral dysfunction or if due to the patient not wanting to  take the contrast trials. Oral holding also noted on occasion.      Impaired Oral Transit Phase: tongue pumping  Tongue Pumping: other (see comments)  Oral Transit Phase, Comment: Tongue pumping noted on spoon size trial of thins. May be due to confused state and not wanting to take barium contrast. Not noted on other trials. Unable to assess solids as pt spit out.        Pharyngeal Phase  Initiation of Pharyngeal Swallow: bolus in valleculae, bolus in pyriform sinuses  Pharyngeal Phase: impaired pharyngeal phase of swallowing  Pharyngeal Phase, Comment: Pt presents with a mild pharyngeal dysphagia with noted delay of swallow on all trials and consistency successfully tested. Nectar, honey and puree demonstrated pooling in the valleculae during the swallow. Thins pooled to the level of the pyriforms before the swallow. Decreased vestibular closure noted during the swallow but full closure obtained by the end of the pharyngeal phase. No definitive penetration or aspiration was viewed. Limited images due to decreased cooperation and confusion. Unable to assess solids due to pt manually removing stating it was 'making me sick'.   VFSS Summary: Pt presents with a mild oropharyngeal dysphagia with mild decreased oral prep with holding inconsistently and tongue pumping on nectar. Feel oral phase issues are more cognitive in nature than due to physiological deficits. Pt w/mild pharyngeal phase with mild delay of swallow with nectar, honey and puree reaching the valleculae and thins reaching the pyriforms prior to initiation of the swallow. Pt with decreased vestibular closure noted at the onset of the swallow but full closure during the height of the swallow. No penetration or aspiration was viewed and no significant oral or pharyngeal residue after the swallow. Limited trials and limitations in imaging due to pt moving during the study. Unable to follow directions very well during the study due to increase in confusion or  increase in depression/anxiety.    Cervical Esophageal Phase  Esophageal Phase, Comment: Unable to evaluate esophageal phase due to decreased cooperation ability to hold still during exam.

## 2021-08-09 NOTE — NURSING NOTE
Pt woke up very confused; more so than last night.  Did not know where she was, who I was, or even that she had 2 sons. Refused early medications this morning.

## 2021-08-09 NOTE — PROGRESS NOTES
LOS: 3 days   Patient Care Team:  Chari Mercado MD as PCP - General  Chari Mercado MD as PCP - Family Medicine  Christos Rob MD as Surgeon (General Surgery)  German Wylie MD as Surgeon (Orthopedic Surgery)  Yasmani Baugh MD as Consulting Physician (Nephrology)  Yasmani Baugh MD as Referring Physician (Nephrology)  Elieser Headley MD as Consulting Physician (Hematology and Oncology)    Chief Complaint:     F/u atrial flutter    Interval History:     She seems confused this morning and is really unable to answer questions.    Objective   Vital Signs  Temp:  [98 °F (36.7 °C)-98.5 °F (36.9 °C)] 98 °F (36.7 °C)  Heart Rate:  [65-76] 65  Resp:  [16-17] 16  BP: (132-166)/(57-73) 166/65    Intake/Output Summary (Last 24 hours) at 8/9/2021 0755  Last data filed at 8/8/2021 1845  Gross per 24 hour   Intake 720 ml   Output 600 ml   Net 120 ml       Comfortable NAD  Neck supple, no JVD or thyromegaly appreciated  S1/S2 RRR, no m/r/g  Lungs CTA B, normal effort  Abdomen S/NT/ND (+) BS, no HSM appreciated  Extremities warm, no clubbing, cyanosis, or edema  No visible or palpable skin lesions  A/Ox4, mood and affect appropriate, left facial droop    Results Review:      Results from last 7 days   Lab Units 08/09/21  0503 08/08/21  0359 08/07/21  0509 08/07/21  0509 08/06/21  0603 08/06/21  0603   SODIUM mmol/L 139  --   --  137  --  136   POTASSIUM mmol/L 5.1  --   --  4.9  --  4.9   CHLORIDE mmol/L 108*  --   --  105  --  102   CO2 mmol/L 24.0  --   --  26.2  --  26.2   BUN mg/dL 42*  --   --  52*  --  48*   CREATININE mg/dL 2.21* 2.84*  --  3.18*   < > 2.68*   GLUCOSE mg/dL 213*  --    < > 114*   < > 96   CALCIUM mg/dL 9.0  --   --  8.6  --  9.1    < > = values in this interval not displayed.     Results from last 7 days   Lab Units 08/06/21  0603   TROPONIN T ng/mL <0.010     Results from last 7 days   Lab Units 08/09/21  0503 08/07/21  0509 08/06/21  0603   WBC 10*3/mm3  3.83 5.72 8.29   HEMOGLOBIN g/dL 9.6* 9.3* 11.3*   HEMATOCRIT % 30.8* 30.7* 37.7   PLATELETS 10*3/mm3 237 235 290     Results from last 7 days   Lab Units 08/06/21  0603   INR  1.58*         Results from last 7 days   Lab Units 08/06/21  0603   MAGNESIUM mg/dL 2.3           I reviewed the patient's new clinical results.  I personally viewed and interpreted the patient's EKG/Telemetry data        Medication Review:   amiodarone, 200 mg, Oral, Q12H  apixaban, 2.5 mg, Oral, Q12H  atorvastatin, 10 mg, Oral, Daily  desvenlafaxine, 50 mg, Oral, Daily  famotidine, 40 mg, Oral, Daily  gabapentin, 100 mg, Oral, QAM  gabapentin, 200 mg, Oral, Nightly  guaiFENesin, 1,200 mg, Oral, BID  insulin aspart, 0-7 Units, Subcutaneous, TID AC  insulin detemir, 20 Units, Subcutaneous, Nightly  levothyroxine, 88 mcg, Oral, Daily  midodrine, 2.5 mg, Oral, TID AC  nystatin, , Topical, Q12H  O2, 2 L/min, Inhalation, Nightly  pantoprazole, 40 mg, Oral, QAM  sennosides-docusate, 2 tablet, Oral, Daily  sodium chloride, 10 mL, Intravenous, Q12H             Assessment/Plan       Atrial flutter (CMS/HCC)    Moderate malnutrition (CMS/HCC)    1.  Respiratory failure resolved.  May have been due to heart failure.  1. Atrial flutter with RVR.  now back in NSR  2. DM  3. CKD  4. Orthostasis.  5. H/o aspiration.   6. Chronic oxygen at home.     Decrease amiodarone to 200mg daily - f/u with Dr. Sanon/kya in 2-4 weeks in office.  Will sign off.    Neetu Chapin MD  08/09/21  07:55 EDT

## 2021-08-09 NOTE — PROGRESS NOTES
Adult Nutrition  Assessment/PES    Patient Name:  Joya Singh  YOB: 1940  MRN: [id number]  Admit Date:  8/6/2021    Assessment Date:  8/9/2021    Comments:  Encourage po and oral supplement. Goal pt to drink 240 ml Boost Glucose in one day to help meet needs.  Will cont to follow and monitor.     Reason for Assessment     Row Name 08/09/21 1210          Reason for Assessment    Reason For Assessment  follow-up protocol     Diagnosis  cardiac disease aflutter, moderate malnutrition, resp failure hx DM CKD         Nutrition/Diet History     Row Name 08/09/21 1211          Nutrition/Diet History    Typical Food/Fluid Intake  Spoke w pt after am meal, eyes open watching tv, doesn't respond until get close & says yes. RN aware. SLP swallow eval today         Anthropometrics     Row Name 08/09/21 1211 08/09/21 0748       Anthropometrics    Weight  -- 187.7#  85.1 kg (187 lb 11.2 oz)       Body Mass Index (BMI)    BMI Assessment  BMI 35-39.9: obesity grade II  --        Labs/Tests/Procedures/Meds     Row Name 08/09/21 1212          Labs/Procedures/Meds    Lab Results Reviewed  reviewed     Lab Results Comments  glu 213, 215, 162, BUn 42/Creat 2.2 H        Diagnostic Tests/Procedures    Diagnostic Test/Procedure Reviewed  reviewed        Medications    Pertinent Medications Reviewed  reviewed     Pertinent Medications Comments  novolog, levemir             Nutrition Prescription Ordered     Row Name 08/09/21 1212          Nutrition Prescription PO    Supplement  Boost Glucose Control (Glucerna Shake)     Supplement Frequency  3 times a day     Common Modifiers  Cardiac;Consistent Carbohydrate;GI Soft/[place]         Evaluation of Received Nutrient/Fluid Intake     Row Name 08/09/21 1212          Fluid Intake Evaluation    Oral Fluid (mL)  [address] x 3, 27%        PO Evaluation    Number of Meals  74     % PO Intake  43               Problem/Interventions:  Problem 1     Row Name 08/09/21 1213          Nutrition  Diagnoses Problem 1    Problem 1  Malnutrition     Etiology (related to)  Factors Affecting Nutrition     Signs/Symptoms (evidenced by)  Report/Observation               Intervention Goal     Row Name 08/09/21 1213          Intervention Goal    General  Meet nutritional needs for age/condition     PO  Increase intake;PO intake (%)     PO Intake %  50 % of meals and at least 1 supplement per day         Nutrition Intervention     Row Name 08/09/21 1214          Nutrition Intervention    RD/Tech Action  Encourage intake;Follow Tx progress           Education/Evaluation     Row Name 08/09/21 1214          Education    Education  Education not appropriate at this time        Monitor/Evaluation    Monitor  Per protocol;I&O;PO intake;Supplement intake;Pertinent labs;Weight           Electronically signed by:  Cristiane Bush RD  08/09/21 12:14 EDT

## 2021-08-09 NOTE — PROGRESS NOTES
Contacted by staff regarding patient confusion over night.   D/C hydrochiral medrano shows last rx 6/2021  Monitor

## 2021-08-10 NOTE — PLAN OF CARE
Goal Outcome Evaluation:  Plan of Care Reviewed With: patient        Progress: improving     PT was a/o x 0 beginning of shift.  MD notified CT head performed.  PT more alert to baseline in afternoon. Pt took medication with help from caregiver.

## 2021-08-10 NOTE — PROGRESS NOTES
"Hospitalist Team      Patient Care Team:  Chari Mercado MD as PCP - General  Chari Mercado MD as PCP - Family Medicine  Christos Rob MD as Surgeon (General Surgery)  German Wylie MD as Surgeon (Orthopedic Surgery)  Yasmani Baugh MD as Consulting Physician (Nephrology)  Yasmani Baugh MD as Referring Physician (Nephrology)  Elieser Headley MD as Consulting Physician (Hematology and Oncology)        Chief Complaint: Follow-up confusion    Subjective    Staff report she was very confused this morning.  However, when I walked in the room to see her, she immediately recognized me.  We checked a head CT which was negative.  She has done well today, and clinically this evening, she appears better and again, instantly recognizes me.  She denies chest pain and dyspnea.  Not really eating much because she doesn't like the food.    Objective    Vital Signs  Temp:  [98.1 °F (36.7 °C)-98.4 °F (36.9 °C)] 98.4 °F (36.9 °C)  Heart Rate:  [69-74] 70  Resp:  [16-18] 18  BP: (139-159)/(58-78) 156/78  Oxygen Therapy  SpO2: 96 %  Pulse Oximetry Type: Intermittent  Device (Oxygen Therapy): room air  Flow (L/min): 2}  Flowsheet Rows      First Filed Value   Admission Height  154.9 cm (61\") Documented at 08/06/2021 0600   Admission Weight  87 kg (191 lb 12.8 oz) Documented at 08/06/2021 0600          Physical Exam:    General: Appears stated age in no acute distress.  Lungs: Clear breath sounds throughout all fields.  Her excursion is normal.    CV: Heart sounds are distant but regular.  I appreciate no murmur.  Radial pulses are 2+ and symmetric.  Abdomen: Obese, soft, and non-tender w/ active bowel sounds.  MSK: No C/C/E.  No asymmetry.  Neuro: CN II-XII grossly intact.  Psych: Pleasant affect.  Ox3.    Results Review:     I reviewed the patient's new clinical results.    Lab Results (last 24 hours)     Procedure Component Value Units Date/Time    POC Glucose Once [[id number]]  " (Abnormal) Collected: 08/10/21 1658    Specimen: Blood Updated: 08/10/21 1704     Glucose 201 mg/dL      Comment: Meter: [id number] : [id number] [place] [facility]igh CNA       POC Glucose Once [[id number]]  (Abnormal) Collected: 08/10/21 1214    Specimen: Blood Updated: 08/10/21 1220     Glucose 225 mg/dL      Comment: Meter: [id number] : [id number] [place] Haleigh CNA       Procalcitonin [[id number]]  (Abnormal) Collected: 08/10/21 0921    Specimen: Blood Updated: 08/10/21 1117     Procalcitonin 0.44 ng/mL     Narrative:      Results may be falsely decreased if patient taking Biotin.     Renal Function Panel [[id number]]  (Abnormal) Collected: 08/10/21 0921    Specimen: Blood Updated: 08/10/21 0947     Glucose 202 mg/dL      BUN 32 mg/dL      Creatinine 1.74 mg/dL      Sodium 142 mmol/L      Potassium 4.9 mmol/L      Chloride 109 mmol/L      CO2 26.8 mmol/L      Calcium 9.2 mg/dL      Albumin 3.30 g/dL      Phosphorus 3.1 mg/dL      Anion Gap 6.2 mmol/L      BUN/Creatinine Ratio 18.4     eGFR Non African Amer 28 mL/min/1.73     Narrative:      GFR Normal >60  Chronic Kidney Disease <60  Kidney Failure <15      Blood Culture - Blood, Hand, Left [[id number]] Collected: 08/06/21 0740    Specimen: Blood from Hand, Left Updated: 08/10/21 0745     Blood Culture No growth at 4 days    POC Glucose Once [[id number]]  (Abnormal) Collected: 08/10/21 0721    Specimen: Blood Updated: 08/10/21 0728     Glucose 137 mg/dL      Comment: Meter: [id number] : [id number] [employer] CNA       Blood Culture - Blood, Arm, Right [[id number]] Collected: 08/06/21 0603    Specimen: Blood from Arm, Right Updated: 08/10/21 0715     Blood Culture No growth at 4 days          Imaging Results (Last 24 Hours)     Procedure Component Value Units Date/Time    CT Head Without Contrast [[id number]] Collected: 08/10/21 1123     Updated: 08/10/21 1125    Narrative:      CT Head WO    HISTORY:   Increasing confusion this morning,  dizziness    TECHNIQUE:   Axial unenhanced head CT. Radiation dose reduction techniques included automated exposure control or exposure modulation based on body size. Count of known CT and cardiac nuc med studies performed in previous 12 months: 2.     Time of scan: 10:55 AM    COMPARISON:   7/29/2020    FINDINGS:   No intracranial hemorrhage, mass, or infarct. No hydrocephalus or extra-axial fluid collection. There are senescent changes, including volume loss and nonspecific white matter change, but no acute abnormality is seen. The skull base, calvarium, and  extracranial soft tissues are normal.      Impression:      Senescent changes without acute abnormality.          Signer Name: Henry Mendiola MD   Signed: 8/10/2021 11:23 AM   Workstation Name: [id number]    [facility] reviewed personally by physician.        Medication Review:   I have reviewed the patient's current medication list    Current Facility-Administered Medications:   •  acetaminophen (TYLENOL) tablet 650 mg, 650 mg, Oral, Q4H PRN **OR** acetaminophen (TYLENOL) 160 MG/5ML solution 650 mg, 650 mg, Oral, Q4H PRN **OR** acetaminophen (TYLENOL) suppository 650 mg, 650 mg, Rectal, Q4H PRN, Serafin Alvarado DO  •  albuterol (PROVENTIL) nebulizer solution 0.083% 2.5 mg/3mL, 2.5 mg, Nebulization, Q6H PRN, Serafin Alvarado DO  •  amiodarone (PACERONE) tablet 200 mg, 200 mg, Oral, Q24H, Neetu Chapin MD, 200 mg at 08/10/21 0941  •  apixaban (ELIQUIS) tablet 2.5 mg, 2.5 mg, Oral, Q12H, Serafin Alvarado DO, 2.5 mg at 08/10/21 0942  •  atorvastatin (LIPITOR) tablet 10 mg, 10 mg, Oral, Daily, Serafin Alvarado DO, 10 mg at 08/10/21 0942  •  desvenlafaxine (PRISTIQ) 24 hr tablet 50 mg, 50 mg, Oral, Daily, Serafin Alvarado DO, 50 mg at 08/10/21 0941  •  dextrose (D50W) 25 g/ 50mL Intravenous Solution 25 g, 25 g, Intravenous, Q15 Min PRN, Serafin Alvarado, DO  •  dextrose (GLUTOSE) oral gel 15 g, 15 g,  Oral, Q15 Min PRN, Serafin Alvarado DO  •  gabapentin (NEURONTIN) capsule 100 mg, 100 mg, Oral, QAM, Serafin Alvarado DO, 100 mg at 08/10/21 0942  •  gabapentin (NEURONTIN) capsule 200 mg, 200 mg, Oral, Nightly, Serafin Alvarado DO, 200 mg at 08/09/21 2057  •  glucagon (GLUCAGEN) injection 1 mg, 1 mg, Subcutaneous, Q15 Min PRN, Serafin Alvarado DO  •  guaiFENesin (MUCINEX) 12 hr tablet 1,200 mg, 1,200 mg, Oral, BID, Serafin Alvarado DO, 1,200 mg at 08/10/21 0941  •  insulin aspart (novoLOG) injection 0-7 Units, 0-7 Units, Subcutaneous, TID AC, Serafin Alvarado DO, 3 Units at 08/10/21 1733  •  insulin detemir (LEVEMIR) injection 40 Units, 40 Units, Subcutaneous, Nightly, Elton Guzman MD  •  levothyroxine (SYNTHROID, LEVOTHROID) tablet 88 mcg, 88 mcg, Oral, Daily, Serafin Alvarado DO, 88 mcg at 08/10/21 0942  •  melatonin tablet 5 mg, 5 mg, Oral, Nightly PRN, Serafin Alvarado DO  •  midodrine (PROAMATINE) tablet 2.5 mg, 2.5 mg, Oral, BID AC, Neetu Chapin MD  •  nitroglycerin (NITROSTAT) SL tablet 0.4 mg, 0.4 mg, Sublingual, Q5 Min PRN, Serafin Alvarado DO  •  nystatin (MYCOSTATIN) powder, , Topical, Q12H, Serafin Alvarado DO, Given at 08/10/21 1253  •  O2 (OXYGEN), 2 L/min, Inhalation, Nightly, Serafin Alvarado DO, 2 L/min at 08/08/21 2210  •  ondansetron (ZOFRAN) tablet 4 mg, 4 mg, Oral, Q6H PRN **OR** ondansetron (ZOFRAN) injection 4 mg, 4 mg, Intravenous, Q6H PRN, Serafin Alvarado DO, 4 mg at 08/09/21 0900  •  ondansetron (ZOFRAN) tablet 8 mg, 8 mg, Oral, TID PRN, Serafin Alvarado DO  •  pantoprazole (PROTONIX) EC tablet 40 mg, 40 mg, Oral, QAM, Serafin Alvarado, , 40 mg at 08/10/21 0942  •  sennosides-docusate (PERICOLACE) 8.6-50 MG per tablet 2 tablet, 2 tablet, Oral, Daily, Serafin Alvarado DO, 2 tablet at 08/10/21 0941  •  sodium chloride 0.9 % flush 10 mL, 10 mL, Intravenous, PRN, Serafin Alvarado DO, 10 mL at 08/06/21 1411  •  sodium  chloride 0.9 % flush 10 mL, 10 mL, Intravenous, Q12H, Serafin Alvarado DO, 10 mL at 08/10/21 0942  •  sodium chloride 0.9 % infusion 40 mL, 40 mL, Intravenous, PRN, Serafin Alvarado DO    Facility-Administered Medications Ordered in Other Encounters:   •  acetaminophen (TYLENOL) tablet 650 mg, 650 mg, Oral, Q6H PRN, Elieser Headley MD  •  diphenhydrAMINE (BENADRYL) capsule 25 mg, 25 mg, Oral, Once, Elieser Headley MD  •  lactated ringers infusion, 100 mL/hr, Intravenous, Continuous, Serafin Ferreira CRNA  •  ondansetron (ZOFRAN) injection 4 mg, 4 mg, Intravenous, Once PRN, Serafin Ferreira CRNA      Assessment/Plan     1.  Confusion: This appears to have resolved.  Narcotic pain medicine was stopped the night before last.  CT was negative.  No further work-up at this time.    2.  JOYCE on CKD IV: Resolved.  Creatinine back to baseline.    3.  New-onset afib/flutter: Resolved.  Amiodarone dose decreased by Cardiology.  Follow-up w/ APRN in 2 weeks after discharge.    4.  Diabetes Mellitus, Type 2 in Obese: Bedsides and AM not at goal.  Increased basal insulin.  Continue SSI coverage.    5.  Hypertension w/ history of Orthostasis: No acute issues.  BP near goal.  Continue to monitor trend.    6.  Acute Hypoxic Respiratory Failure: Resolved.  No further abx therapy.    Plan for disposition: AD in AM    Elton Guzman MD  08/10/21  19:21 EDT

## 2021-08-10 NOTE — PLAN OF CARE
Goal Outcome Evaluation:  Plan of Care Reviewed With: patient           Outcome Summary: OT - Patient pleasantly confused but did agree to OT after encouragement. Patient performed supine to sit with CGA and sit to stand transfer from EOB with CGA and verbal cues for hand placement. Patient performed in room mobility with FWW and CGA. Patient was max A  for doffing/donning brief. Patient stood from recliner multiple times with CGA. Daughter-in-law present and reported they plan to take patient home at discharge.

## 2021-08-10 NOTE — PROGRESS NOTES
[facility] SURG    8/10/2021    Patient ID:  Name:  Joya Singh  MRN:  [id number]  1940  81 y.o.  female            CC/Reason for visit: Recurrent pneumonia    Interval hx: Feeling better, a little more awake, remembered me from yesterday. Daughter was in the room, worse mental status than her baseline, significantly worse per daughter.      ROS: No chest pain, no fever, no abdominal pain    Vitals:  Vitals:    08/09/21 1710 08/09/21 1930 08/09/21 2100 08/10/21 0500   BP:  139/65  145/59   BP Location:  Right arm  Right arm   Patient Position:  Sitting  Lying   Pulse: 67 69  74   Resp: 16 16  16   Temp:  98.3 °F (36.8 °C)  98.1 °F (36.7 °C)   TempSrc:  Oral  Oral   SpO2: 93% 91% 91% 92%   Weight:       Height:               Body mass index is 35.47 kg/m².    Intake/Output Summary (Last 24 hours) at 8/10/2021 0846  Last data filed at 8/9/2021 1822  Gross per 24 hour   Intake 520 ml   Output --   Net 520 ml       Exam:  GEN:  No distress, Alert, oriented x 3, but slow response   LUNGS: A few coarse breath sounds but overall clear breath sounds bilat, no use of accessory muscles  CV:  Normal S1S2, without murmur, trace ankle edema  ABD:  Non tender, obese  Skin: no rashes      Scheduled meds:  amiodarone, 200 mg, Oral, Q24H  apixaban, 2.5 mg, Oral, Q12H  atorvastatin, 10 mg, Oral, Daily  desvenlafaxine, 50 mg, Oral, Daily  gabapentin, 100 mg, Oral, QAM  gabapentin, 200 mg, Oral, Nightly  guaiFENesin, 1,200 mg, Oral, BID  insulin aspart, 0-7 Units, Subcutaneous, TID AC  insulin detemir, 25 Units, Subcutaneous, Nightly  levothyroxine, 88 mcg, Oral, Daily  midodrine, 2.5 mg, Oral, BID AC  nystatin, , Topical, Q12H  O2, 2 L/min, Inhalation, Nightly  pantoprazole, 40 mg, Oral, QAM  sennosides-docusate, 2 tablet, Oral, Daily  sodium chloride, 10 mL, Intravenous, Q12H      IV meds:                           Data Review:   I reviewed the patient's medications and new clinical results.    COVID19   Date  Value Ref Range Status   08/06/2021 Not Detected Not Detected - Ref. Range Final         Lab Results   Component Value Date    CALCIUM 9.0 08/09/2021    PHOS 3.6 08/09/2021    MG 2.3 08/06/2021    MG 2.1 07/29/2020    MG 2.1 10/25/2018     Results from last 7 days   Lab Units 08/09/21  0503 08/08/21  0359 08/07/21  0509 08/06/21  0603 08/06/21 0603   SODIUM mmol/L 139  --  137  --  136   POTASSIUM mmol/L 5.1  --  4.9  --  4.9   CHLORIDE mmol/L 108*  --  105  --  102   CO2 mmol/L 24.0  --  26.2  --  26.2   BUN mg/dL 42*  --  52*  --  48*   CREATININE mg/dL 2.21* 2.84* 3.18*   < > 2.68*   CALCIUM mg/dL 9.0  --  8.6  --  9.1   BILIRUBIN mg/dL  --   --   --   --  0.7   ALK PHOS U/L  --   --   --   --  169*   ALT (SGPT) U/L  --   --   --   --  16   AST (SGOT) U/L  --   --   --   --  18   GLUCOSE mg/dL 213*  --  114*  --  96   WBC 10*3/mm3 3.83  --  5.72  --  8.29   HEMOGLOBIN g/dL 9.6*  --  9.3*  --  11.3*   PLATELETS 10*3/mm3 237  --  235  --  290   INR   --   --   --   --  1.58*   PROBNP pg/mL  --   --   --   --  3,842.0*   PROCALCITONIN ng/mL 0.54*  --  1.04*  --  0.71*    < > = values in this interval not displayed.     Results from last 7 days   Lab Units 08/06/21  1427 08/06/21  0740 08/06/21 0603   BLOODCX   --  No growth at 4 days No growth at 4 days   MRSACX  No Methicillin Resistant Staphylococcus aureus isolated  --   --            ASSESSMENT:     Atrial flutter (CMS/HCC)    Moderate malnutrition (CMS/HCC)  Recurrent aspiration pneumonia  GERD    PLAN:  - continue to hold abx, will take a little for her lungs to fully heal  - limit sedating medications as much as possible   - of note still some confusion, unknown if this is just due to her acute illness, but per family she is much worse, would have primary consider further evaluation, given acute change in mental status      - Follow speech recs, continue ppi on discharge    - Increase her mobility and continue to follow the patient clinically, aggressive IS  and pulm toilet to prevent atelectasis as she is at high risk  - no nocturnal desats last night, should get follow up sleep study outpt, could get home sleep study and follow with her PCP.     Thank you for this consult, pulmonary will sign off at this time.      Max Fowler MD  8/10/2021

## 2021-08-10 NOTE — PLAN OF CARE
Goal Outcome Evaluation:  Plan of Care Reviewed With: patient        Progress: no change  Outcome Summary: Pt very confused again through out the shift. Didn't remember that she had son's and became afraid, needing strong reassurance. Pt on turn schedule. Does not want to take her medicines this am again.

## 2021-08-10 NOTE — THERAPY TREATMENT NOTE
Acute Care - Occupational Therapy Treatment Note  VICKY Sharma     Patient Name: Joya Singh  : 1940  MRN: [id number]  Today's Date: 8/10/2021  Onset of Illness/Injury or Date of Surgery: 21  Date of Referral to OT: 21  Referring Physician: Rashmi LE    Admit Date: 2021       ICD-10-CM ICD-9-CM   1. Atrial flutter, unspecified type (CMS/[facility])  I48.92 427.32   2. Pneumonia of left lower lobe due to infectious organism  J18.9 486   3. JOYCE (acute kidney injury) (CMS/[facility])  N17.9 584.9   4. Oropharyngeal dysphagia  R13.12 787.22     Patient Active Problem List   Diagnosis   • Arthritis   • Chronic back pain   • Chronic anemia   • Anxiety and depression   • Type 2 diabetes mellitus with neurologic complication (CMS/[facility])   • Brittle diabetes mellitus (CMS/[facility])   • Dizzy   • Hyperlipidemia   • Hypertension   • Insomnia with sleep apnea   • Obstructive sleep apnea syndrome   • Peripheral neuropathy   • Diabetic gastroparesis (CMS/[facility])   • Primary localized osteoarthrosis of left shoulder region   • Rotator cuff tendonitis   • Acquired hypothyroidism   • Anxiety   • History of biliary T-tube placement   • CKD stage 3 due to type 2 diabetes mellitus (CMS/[facility])   • Complex tear of medial meniscus of right knee as current injury   • Insufficiency fracture of tibia   • Primary osteoarthritis of left knee   • Orthostatic hypotension   • Tendinitis of right rotator cuff   • AC joint arthropathy   • Subacromial impingement of right shoulder   • Right carpal tunnel syndrome   • Anemia requiring transfusions   • Monoclonal gammopathy of unknown significance (MGUS)   • Myelodysplastic syndrome with 5q deletion (CMS/[facility])   • History of deep venous thrombosis (DVT) of distal vein of left lower extremity   • Moderate episode of recurrent major depressive disorder (CMS/[facility])   • Chronic diastolic CHF (congestive heart failure) (CMS/[facility])   • Knee pain   • Primary osteoarthritis of right knee   • Iron overload,  transfusional   • Uncontrolled type 2 diabetes mellitus with hyperglycemia (CMS/[facility])   • Localized edema   • Type 2 diabetes mellitus with diabetic neuropathy (CMS/[facility])   • Vitamin D deficiency   • Subacromial bursitis of left shoulder joint   • Gastroesophageal reflux disease with esophagitis without hemorrhage   • Esophageal dysphagia   • Anemia in neoplastic disease   • Leukocytes in urine   • Atrial flutter (CMS/[facility])   • Moderate malnutrition (CMS/[facility])     Past Medical History:   Diagnosis Date   • Allergic rhinitis    • Anemia    • Anxiety    • Appetite absent    • Arthritis    • Asthma    • Back pain    • Bell's palsy    • Black tarry stools    • Blood in stool    • Chronic fatigue    • CKD (chronic kidney disease) stage 3, GFR 30-59 ml/min (CMS/[facility])    • Community acquired pneumonia of left lung 10/25/2018   • Cough    • Depression    • Diabetes mellitus (CMS/[facility])     LAST A1C 6   • Diabetic gastroparesis (CMS/[facility]) 2/19/2016   • Difficulty walking    • Excessive urination at night    • Frequent urination    • GERD (gastroesophageal reflux disease)    • GI bleed    • Gout    • H/O blood clots     LEFT LEG 7 OR 8 YEARS AGO   • Heat intolerance    • History of fall 10/2018   • History of prior pregnancies     x8, miscarriage 5   • History of transfusion 11/2018    due to anemia   • Hyperlipidemia    • Hypertension    • Hypothyroidism    • Normal coronary arteries     by cath 2013   • Orthostatic hypotension    • AARON (obstructive sleep apnea)     DOESNT WEAR REGULARLY   • Pneumonia    • PONV (postoperative nausea and vomiting)    • Skin cancer    • Stroke (CMS/[facility])     Several mini-strokes   • TIA (transient ischemic attack)     LAST TIA JULY 2017   • Urination pain    • UTI (urinary tract infection)     Dec 2018 and Jan 2019     Past Surgical History:   Procedure Laterality Date   • BACK SURGERY      HARDWARE   • CHOLECYSTECTOMY      OPEN   • COLONOSCOPY  2011    due for repeat in 2021   • COLONOSCOPY N/A  10/28/2018    Procedure: COLONOSCOPY;  Surgeon: Emmanuel Rogers MD;  Location:  [facility];  Service: Gastroenterology   • ENDOSCOPY N/A 10/26/2018    Procedure: ESOPHAGOGASTRODUODENOSCOPY;  Surgeon: Emmanuel Rogers MD;  Location:  [facility];  Service: Gastroenterology   • ENDOSCOPY N/A 5/13/2021    Procedure: ESOPHAGOGASTRODUODENOSCOPY, biopsy, dilatation;  Surgeon: Emmanuel Rogers MD;  Location:  [facility];  Service: Gastroenterology;  Laterality: N/A;  Esophagitis; Dilation- no stricture; Biopsy- esophagus   • HYSTERECTOMY      PARTIAL    • KNEE SURGERY     • NECK SURGERY     • SPINE SURGERY     • TUMOR REMOVAL Left     Leg   • UPPER GASTROINTESTINAL ENDOSCOPY  2014    gastritis.  done by dr. hastings            OT ASSESSMENT FLOWSHEET (last 12 hours)      OT Evaluation and Treatment     Row Name 08/10/21 1028                   OT Time and Intention    Subjective Information  complains of;pain chronic in left shoulder  -EN        Document Type  therapy note (daily note)  -EN        Mode of Treatment  occupational therapy  -EN        Patient Effort  adequate  -EN        Comment  Patient required encouragement for participation. Daughter-in-law present and helpful encouraging the patient.  -EN           General Information    Patient/Family/Caregiver Comments/Observations  Patient reclined in bed, daughter-in-law present. Agreed to OT after encouragement.  -EN           Cognition    Personal Safety Interventions  gait belt;nonskid shoes/slippers when out of bed  -EN           Pain Scale: Numbers Pre/Post-Treatment    Pre/Posttreatment Pain Comment  Patient reported chronic pain in left shoulder, daughter-in-law stated pain is chronic.  -EN        Pain Intervention(s)  Repositioned  -EN           Bed Mobility    Bed Mobility  supine-sit  -EN        Supine-Sit [place] (Bed Mobility)  contact guard;verbal cues  -EN        Comment (Bed Mobility)  extended time to complete  -EN            Functional Mobility    Functional Mobility- Ind. Level  contact guard assist;verbal cues required  -EN        Functional Mobility- Device  rolling walker  -EN        Functional Mobility- Comment  Patient performed in room mobility with FWW and CGA.   -EN           Transfer Assessment/Treatment    Transfers  sit-stand transfer;stand-sit transfer  -EN        Comment (Transfers)  verbal cues for hand placement, patient stood from EOB and from recliner multiple times with CGA and v/cs for hand placement  -EN           Transfers    Sit-Stand [place] (Transfers)  verbal cues;contact guard  -EN        Stand-Sit [place] (Transfers)  contact guard;verbal cues  -EN           Sit-Stand Transfer    Assistive Device (Sit-Stand Transfers)  walker, front-wheeled  -EN           Stand-Sit Transfer    Assistive Device (Stand-Sit Transfers)  walker, front-wheeled  -EN           Balance    Comment, Balance  Patient performed static sitting X 1-2 minutes with CGA/FWW and no LOB.  -EN           Lower Body Dressing Assessment/Training    [place] Level (Lower Body Dressing)  doff;don  -EN        Comment (Lower Body Dressing)  Patient required max assist for don/doffing brief.  -EN           Plan of Care Review    Plan of Care Reviewed With  patient  -EN        Outcome Summary  OT - Patient pleasantly confused but did agree to OT after encouragement. Patient performed supine to sit with CGA and sit to stand transfer from EOB with CGA and verbal cues for hand placement. Patient performed in room mobility with FWW and CGA. Patient was max A  for doffing/donning brief. Patient stood from recliner multiple times with CGA. Daughter-in-law present and reported they plan to take patient home at discharge.  -EN           Positioning and Restraints    Pre-Treatment Position  in bed  -EN        Post Treatment Position  -- with transporter in transport chair  -EN           Progress Summary (OT)    Progress Toward Functional Goals  (OT)  progress toward functional goals as expected  -EN        Barriers to Overall Progress (OT)  confusion  -EN          User Key  (r) = Recorded By, (t) = Taken By, (c) = Cosigned By    Initials Name Effective Dates    Mildred Mendez OTR 06/16/21 -            Occupational Therapy Education                 Title: PT OT SLP Therapies (Done)     Topic: Occupational Therapy (Done)     Point: ADL training (Done)     Description:   Instruct learner(s) on proper safety adaptation and remediation techniques during self care or transfers.   Instruct in proper use of assistive devices.              Learning Progress Summary           Patient Acceptance, E, VU by MARIA FERNANDA at 8/10/2021 1127    Comment: Safety during functional transfers/mobility                   Point: Body mechanics (Done)     Description:   Instruct learner(s) on proper positioning and spine alignment during self-care, functional mobility activities and/or exercises.              Learning Progress Summary           Patient Acceptance, E,TB, VU by EMILIA at 8/7/2021 1008    Comment: pt educated on adls, and safety with functional transfers                               User Key     Initials Effective Dates Name Provider Type Discipline    MARIA FERNANDA 06/16/21 -  Mildred Montaño, OTR Occupational Therapist OT    EMILIA 06/16/21 -  Haleigh Alarcon OTR Occupational Therapist OT                  OT Recommendation and Plan     Progress Toward Functional Goals (OT): progress toward functional goals as expected  Plan of Care Review  Plan of Care Reviewed With: patient  Outcome Summary: OT - Patient pleasantly confused but did agree to OT after encouragement. Patient performed supine to sit with CGA and sit to stand transfer from EOB with CGA and verbal cues for hand placement. Patient performed in room mobility with FWW and CGA. Patient was max A  for doffing/donning brief. Patient stood from recliner multiple times with CGA. Daughter-in-law present and reported  they plan to take patient home at discharge.  Plan of Care Reviewed With: patient  Outcome Summary: OT - Patient pleasantly confused but did agree to OT after encouragement. Patient performed supine to sit with CGA and sit to stand transfer from EOB with CGA and verbal cues for hand placement. Patient performed in room mobility with FWW and CGA. Patient was max A  for doffing/donning brief. Patient stood from recliner multiple times with CGA. Daughter-in-law present and reported they plan to take patient home at discharge.    Outcome Measures     Row Name 08/10/21 1100 08/09/21 0838          How much help from another person do you currently need...    Turning from your back to your side while in flat bed without using bedrails?  --  3  -JW     Moving from lying on back to sitting on the side of a flat bed without bedrails?  --  3  -JW     Moving to and from a bed to a chair (including a wheelchair)?  --  3  -JW     Standing up from a chair using your arms (e.g., wheelchair, bedside chair)?  --  3  -JW     Climbing 3-5 steps with a railing?  --  1  -JW     To walk in hospital room?  --  2  -JW     AM-PAC 6 Clicks Score (PT)  --  15  -JW        How much help from another is currently needed...    Putting on and taking off regular lower body clothing?  2  -EN  --     Bathing (including washing, rinsing, and drying)  2  -EN  --     Toileting (which includes using toilet bed pan or urinal)  2  -EN  --     Putting on and taking off regular upper body clothing  4  -EN  --     Taking care of personal grooming (such as brushing teeth)  4  -EN  --     Eating meals  4  -EN  --     AM-PAC 6 Clicks Score (OT)  18  -EN  --        Functional Assessment    Outcome Measure Options  --  AM-PAC 6 Clicks Basic Mobility (PT)  -       User Key  (r) = Recorded By, (t) = Taken By, (c) = Cosigned By    Initials Name Provider Type    Mildred Mendez, OTR Occupational Therapist    Steffany Cherry, PT Physical Therapist           Time Calculation:   Time Calculation- OT     Row Name 08/10/21 1128             Time Calculation- OT    OT Start Time  1028  -EN      OT Stop Time  1051  -EN      OT Time Calculation (min)  23 min  -EN        User Key  (r) = Recorded By, (t) = Taken By, (c) = Cosigned By    Initials Name Provider Type    EN Mildred Montaño OTR Occupational Therapist        Therapy Charges for Today     Code Description Service Date Service Provider Modifiers Qty    [id number] HC OT SELF CARE/MGMT/TRAIN EA 15 MIN 8/10/2021 Mildred Montaño OTR GO 2               ROLA Montoya  8/10/2021

## 2021-08-10 NOTE — THERAPY TREATMENT NOTE
Acute Care - Physical Therapy Treatment Note  VICKY Sharma     Patient Name: Joya Singh  : 1940  MRN: [id number]  Today's Date: 8/10/2021   Onset of Illness/Injury or Date of Surgery: 21  Visit Dx:     ICD-10-CM ICD-9-CM   1. Atrial flutter, unspecified type (CMS/[facility])  I48.92 427.32   2. Pneumonia of left lower lobe due to infectious organism  J18.9 486   3. JOYCE (acute kidney injury) (CMS/[facility])  N17.9 584.9   4. Oropharyngeal dysphagia  R13.12 787.22     Patient Active Problem List   Diagnosis   • Arthritis   • Chronic back pain   • Chronic anemia   • Anxiety and depression   • Type 2 diabetes mellitus with neurologic complication (CMS/HCC)   • Brittle diabetes mellitus (CMS/[facility])   • Dizzy   • Hyperlipidemia   • Hypertension   • Insomnia with sleep apnea   • Obstructive sleep apnea syndrome   • Peripheral neuropathy   • Diabetic gastroparesis (CMS/[facility])   • Primary localized osteoarthrosis of left shoulder region   • Rotator cuff tendonitis   • Acquired hypothyroidism   • Anxiety   • History of biliary T-tube placement   • 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)   • Complex tear of medial meniscus of right knee as current injury   • Insufficiency fracture of tibia   • Primary osteoarthritis of left knee   • Orthostatic hypotension   • Tendinitis of right rotator cuff   • AC joint arthropathy   • Subacromial impingement of right shoulder   • Right carpal tunnel syndrome   • Anemia requiring transfusions   • Monoclonal gammopathy of unknown significance (MGUS)   • Myelodysplastic syndrome with 5q deletion (CMS/[facility])   • History of deep venous thrombosis (DVT) of distal vein of left lower extremity   • Moderate episode of recurrent major depressive disorder (CMS/HCC)   • Chronic diastolic CHF (congestive heart failure) (CMS/HCC)   • Knee pain   • Primary osteoarthritis of right knee   • Iron overload, transfusional   • Uncontrolled type 2 diabetes mellitus with hyperglycemia (CMS/[facility])   •  Localized edema   • Type 2 diabetes mellitus with diabetic neuropathy (CMS/[facility])   • Vitamin D deficiency   • Subacromial bursitis of left shoulder joint   • Gastroesophageal reflux disease with esophagitis without hemorrhage   • Esophageal dysphagia   • Anemia in neoplastic disease   • Leukocytes in urine   • Atrial flutter (CMS/HCC)   • Moderate malnutrition (CMS/[facility])     Past Medical History:   Diagnosis Date   • Allergic rhinitis    • Anemia    • Anxiety    • Appetite absent    • Arthritis    • Asthma    • Back pain    • Bell's palsy    • Black tarry stools    • Blood in stool    • Chronic fatigue    • CKD (chronic kidney disease) stage 3, GFR 30-59 ml/min (CMS/[facility])    • Community acquired pneumonia of left lung 10/25/2018   • Cough    • Depression    • Diabetes mellitus (CMS/[facility])     LAST A1C 6   • Diabetic gastroparesis (CMS/[facility]) 2/19/2016   • Difficulty walking    • Excessive urination at night    • Frequent urination    • GERD (gastroesophageal reflux disease)    • GI bleed    • Gout    • H/O blood clots     LEFT LEG 7 OR 8 YEARS AGO   • Heat intolerance    • History of fall 10/2018   • History of prior pregnancies     x8, miscarriage 5   • History of transfusion 11/2018    due to anemia   • Hyperlipidemia    • Hypertension    • Hypothyroidism    • Normal coronary arteries     by cath 2013   • Orthostatic hypotension    • AARON (obstructive sleep apnea)     DOESNT WEAR REGULARLY   • Pneumonia    • PONV (postoperative nausea and vomiting)    • Skin cancer    • Stroke (CMS/[facility])     Several mini-strokes   • TIA (transient ischemic attack)     LAST TIA JULY 2017   • Urination pain    • UTI (urinary tract infection)     Dec 2018 and Jan 2019     Past Surgical History:   Procedure Laterality Date   • BACK SURGERY      HARDWARE   • CHOLECYSTECTOMY      OPEN   • COLONOSCOPY  2011    due for repeat in 2021   • COLONOSCOPY N/A 10/28/2018    Procedure: COLONOSCOPY;  Surgeon: Emmanuel Rogers MD;  Location:   "[facility];  Service: Gastroenterology   • ENDOSCOPY N/A 10/26/2018    Procedure: ESOPHAGOGASTRODUODENOSCOPY;  Surgeon: Emmanuel Rogers MD;  Location:  [facility];  Service: Gastroenterology   • ENDOSCOPY N/A 5/13/2021    Procedure: ESOPHAGOGASTRODUODENOSCOPY, biopsy, dilatation;  Surgeon: Emmanuel Rogers MD;  Location:  [facility];  Service: Gastroenterology;  Laterality: N/A;  Esophagitis; Dilation- no stricture; Biopsy- esophagus   • HYSTERECTOMY      PARTIAL    • KNEE SURGERY     • NECK SURGERY     • SPINE SURGERY     • TUMOR REMOVAL Left     Leg   • UPPER GASTROINTESTINAL ENDOSCOPY  2014    gastritis.  done by dr. hastings        PT Assessment (last 12 hours)      PT Evaluation and Treatment     Row Name 08/10/21 1421          Physical Therapy Time and Intention    Subjective Information  complains of;nausea/vomiting Pt initially reports she feels good, then reports \"I'm sick\"  -BP     Document Type  therapy note (daily note)  -BP     Patient Effort  adequate  -BP     Symptoms Noted During/After Treatment  -- nausea   -BP     Comment  Patient initially reports she feels better. During gait training patient begins to state \"I'm sick,\" discontinued gait and provided patient with emesis bag. Patient unable to continue due to reports of nausea.   -BP     Row Name 08/10/21 1421          General Information    Patient/Family/Caregiver Comments/Observations  Patient reclined in bedside chair. Daughter in law present.   -BP     Limitations/Impairments  safety/cognitive  -BP     Risks Reviewed  patient:;LOB;increased discomfort  -BP     Benefits Reviewed  patient:;improve function;increase independence;increase strength  -BP     Barriers to Rehab  cognitive status  -BP     Row Name 08/10/21 1421          Cognition    Orientation Status (Cognition)  oriented to;person w/ extended time. Provides bday when asked current date   -BP     Personal Safety Interventions  gait belt;nonskid shoes/slippers when out of " "bed  -     Row Name 08/10/21 1421          Pain Scale: Word Pre/Post-Treatment    Pre/Posttreatment Pain Comment  Patient does not complain of pain. States \"i'm sick.\"   -     Row Name 08/10/21 1421          Bed Mobility    Comment (Bed Mobility)  deferred, patient up in chair   -     Row Name 08/10/21 1421          Transfers    Transfers  sit-stand transfer;stand-sit transfer  -BP     Comment (Transfers)  Verbal cues for hand placement required.   -BP     Sit-Stand Bucks (Transfers)  contact guard;verbal cues  -BP     Stand-Sit Bucks (Transfers)  contact guard;verbal cues  -BP     Row Name 08/10/21 1421          Sit-Stand Transfer    Assistive Device (Sit-Stand Transfers)  walker, front-wheeled  -BP     Row Name 08/10/21 1421          Stand-Sit Transfer    Assistive Device (Stand-Sit Transfers)  walker, front-wheeled  -BP     Row Name 08/10/21 1421          Gait/Stairs (Locomotion)    Bucks Level (Gait)  contact guard;verbal cues  -     Assistive Device (Gait)  walker, front-wheeled  -BP     Distance in Feet (Gait)  32  -BP     Pattern (Gait)  swing-through  -BP     Deviations/Abnormal Patterns (Gait)  gait speed decreased;stride length decreased  -BP     Bilateral Gait Deviations  forward flexed posture  -BP     Comment (Gait/Stairs)  Gait distance limited due to complaints of nausea, patient states \"i'm sick.\"   -     Row Name 08/10/21 1421          Safety Issues, Functional Mobility    Safety Issues Affecting Function (Mobility)  insight into deficits/self-awareness;problem-solving;judgment;safety precaution awareness  -BP     Comment, Safety Issues/Impairments (Mobility)  Patient requires cues for safety with all transfers and gait.   -     Row Name 08/10/21 1421          Plan of Care Review    Plan of Care Reviewed With  patient  -BP     Outcome Summary  PT: Patient agreeable to PT treatment, reports she is feeling better. Patient performs sit to/from stand transfers with CGA " "and gait x 32 feet with CGA and use of FWW. During gait patient begins to state \"I'm sick.\" Patient refuses further gait distance due to nausea. Patient is oriented to person only, requires extended time to recall her name. Patient provides her birthday when asked the current date. Daughter in law present during session, reports plan is for patient to return home with 24/7 care. Will continue to see for therapy to maximize safety and independence with functional mobility.   -BP     Row Name 08/10/21 1421          Positioning and Restraints    Pre-Treatment Position  sitting in chair/recliner  -BP     Post Treatment Position  chair  -BP     In Chair  reclined;call light within reach;encouraged to call for assist;with family/caregiver fam removed exit alarm & are providing constant supervision   -BP     Row Name 08/10/21 1421          Progress Summary (PT)    Progress Toward Functional Goals (PT)  progress toward functional goals is gradual  -BP     Barriers to Overall Progress (PT)  cognitive status   -BP     Row Name 08/10/21 1421          Therapy Plan Review/Discharge Plan (PT)    Therapy Plan Review (PT)  patient;care plan/treatment goals reviewed;risks/benefits reviewed;current/potential barriers reviewed;participants included daughter in law   -BP     Patient/Family Concerns, Anticipated Discharge Disposition (PT)  Patient reports plan is to return home with home health PT.   -BP       User Key  (r) = Recorded By, (t) = Taken By, (c) = Cosigned By    Initials Name Provider Type    Olegario Wang, PT Physical Therapist        Physical Therapy Education                 Title: PT OT SLP Therapies (Done)     Topic: Physical Therapy (Done)     Point: Mobility training (Done)     Learning Progress Summary           Patient Acceptance, E,TB, VU,NR by BP at 8/10/2021 1452    Acceptance, E,TB, VU by SADI at 8/9/2021 1129    Acceptance, E,TB, VU,NR by AS at 8/7/2021 1045                               User Key     " "Initials Effective Dates Name Provider Type Discipline    BP 06/16/21 -  Haydee Silva-Maury, PT Physical Therapist PT    JW 06/16/21 -  Steffany Noble, PT Physical Therapist PT    AS 06/16/21 -  Yasmani Wilder, PT Physical Therapist PT              PT Recommendation and Plan  Anticipated Discharge Disposition (PT): home with 24/7 care, home with home health  Progress Summary (PT)  Progress Toward Functional Goals (PT): progress toward functional goals is gradual  Barriers to Overall Progress (PT): cognitive status   Plan of Care Reviewed With: patient  Outcome Summary: PT: Patient agreeable to PT treatment, reports she is feeling better. Patient performs sit to/from stand transfers with CGA and gait x 32 feet with CGA and use of FWW. During gait patient begins to state \"I'm sick.\" Patient refuses further gait distance due to nausea. Patient is oriented to person only, requires extended time to recall her name. Patient provides her birthday when asked the current date. Daughter in law present during session, reports plan is for patient to return home with 24/7 care. Will continue to see for therapy to maximize safety and independence with functional mobility.   Outcome Measures     Row Name 08/10/21 1421 08/10/21 1100 08/09/21 0838       How much help from another person do you currently need...    Turning from your back to your side while in flat bed without using bedrails?  3  -BP  --  3  -JW    Moving from lying on back to sitting on the side of a flat bed without bedrails?  3  -BP  --  3  -JW    Moving to and from a bed to a chair (including a wheelchair)?  3  -BP  --  3  -JW    Standing up from a chair using your arms (e.g., wheelchair, bedside chair)?  3  -BP  --  3  -JW    Climbing 3-5 steps with a railing?  2  -BP  --  1  -JW    To walk in hospital room?  3  -BP  --  2  -JW    AM-PAC 6 Clicks Score (PT)  17  -BP  --  15  -JW       How much help from another is currently needed...    Putting on and " taking off regular lower body clothing?  --  2  -EN  --    Bathing (including washing, rinsing, and drying)  --  2  -EN  --    Toileting (which includes using toilet bed pan or urinal)  --  2  -EN  --    Putting on and taking off regular upper body clothing  --  4  -EN  --    Taking care of personal grooming (such as brushing teeth)  --  4  -EN  --    Eating meals  --  4  -EN  --    AM-PAC 6 Clicks Score (OT)  --  18  -EN  --       Functional Assessment    Outcome Measure Options  AM-PAC 6 Clicks Basic Mobility (PT)  -BP  --  AM-PAC 6 Clicks Basic Mobility (PT)  -JW      User Key  (r) = Recorded By, (t) = Taken By, (c) = Cosigned By    Initials Name Provider Type    EN Mildred Montaño, OTR Occupational Therapist    Olegario Wang, PT Physical Therapist    Steffany Cherry, PT Physical Therapist           Time Calculation:   PT Charges     Row Name 08/10/21 1453             Time Calculation    Start Time  1421  -BP      Stop Time  1432  -BP      Time Calculation (min)  11 min  -BP      PT Received On  08/10/21  -BP      PT - Next Appointment  08/11/21  -BP         Timed Charges    22352 - Gait Training Minutes   11  -BP         Total Minutes    Timed Charges Total Minutes  11  -BP       Total Minutes  11  -BP        User Key  (r) = Recorded By, (t) = Taken By, (c) = Cosigned By    Initials Name Provider Type    Olegario Wang, PT Physical Therapist        Therapy Charges for Today     Code Description Service Date Service Provider Modifiers Qty    [id number] HC GAIT TRAINING EA 15 MIN 8/10/2021 Olegario Silva, PT GP 1          PT G-Codes  Outcome Measure Options: AM-PAC 6 Clicks Basic Mobility (PT)  AM-PAC 6 Clicks Score (PT): 17  AM-PAC 6 Clicks Score (OT): 18    Olegario Silva PT  8/10/2021

## 2021-08-10 NOTE — PLAN OF CARE
"Goal Outcome Evaluation:  Plan of Care Reviewed With: patient           Outcome Summary: PT: Patient agreeable to PT treatment, reports she is feeling better. Patient performs sit to/from stand transfers with CGA and gait x 32 feet with CGA and use of FWW. During gait patient begins to state \"I'm sick.\" Patient refuses further gait distance due to nausea. Patient is oriented to person only, requires extended time to recall her name. Patient provides her birthday when asked the current date. Daughter in law present during session, reports plan is for patient to return home with 24/7 care. Will continue to see for therapy to maximize safety and independence with functional mobility.   "

## 2021-08-11 NOTE — CASE MANAGEMENT/SOCIAL WORK
Continued Stay Note  VICKY Sharma     Patient Name: Joya Singh  MRN: [id number]  Today's Date: 8/11/2021    Admit Date: 8/6/2021    Discharge Plan     Row Name 08/11/21 1357       Plan    Plan Comments  Patient has been accepted at [facility]. Joseph will be a precert.  Will continue to follow    Row Name 08/11/21 7280       Plan    Plan Comments  Spoke with daughter-in-law Mayra and the family has requested a referral to [facility] for short term rehab and possible long term care.  Called joseph at [facility] and sent info for referral.  Will continue to follow        Discharge Codes    No documentation.             Ivanna Villalpando RN

## 2021-08-11 NOTE — PLAN OF CARE
Goal Outcome Evaluation:  Plan of Care Reviewed With: patient           Outcome Summary: OT- Patient performed supine to sit with CGA and sit to stand with CGA and verbal cues. Patient performed toilet transfer with CGA and performed hygiene with SBA. Required mod A for managing brief. Patient performed in room mobility with FWW and CGA. Patient less confused today and more cooperative with therapy.

## 2021-08-11 NOTE — PLAN OF CARE
Goal Outcome Evaluation:  Plan of Care Reviewed With: patient        Progress: improving  Outcome Summary: patient still confused but pleasantly.  No complaints overnight noted, tolerable to all prescribed interventions

## 2021-08-11 NOTE — PROGRESS NOTES
Renal dosing - Apixaban for Atrial Fibrillation    CrCl = 27.4 ml/min.   SCr = 1.65 mg/dl - down from 3.18 mg/dl on 8/7/21    Per System Reanl Dosing Recommendations, will change Apixaban back to home regimen of 5 mg BID.     Thank you-    Maryuri DobbinsD

## 2021-08-11 NOTE — PLAN OF CARE
"Goal Outcome Evaluation:  Plan of Care Reviewed With: patient           Outcome Summary: PT: Patient is oriented x 3 and follows one step commands. Patient more alert with intermittent confusion. Patient performs sit to/from stand transfers with CGA and gait x 25 feet with CGA with uses of FWW. Patient with nausea at rest, significantly increased following gait training. Patient refuses further gait training due to reports of nausea, she states \"I'm so sick.\" Continue to recommend 24/7 care at discharge, if this level of care is not available recommend SNF/ECF  "

## 2021-08-11 NOTE — THERAPY TREATMENT NOTE
Acute Care - Occupational Therapy Treatment Note  VICKY Sharma     Patient Name: Joya Singh  : 1940  MRN: [id number]  Today's Date: 2021  Onset of Illness/Injury or Date of Surgery: 21  Date of Referral to OT: 21  Referring Physician: Rashmi LE    Admit Date: 2021       ICD-10-CM ICD-9-CM   1. Atrial flutter, unspecified type (CMS/[facility])  I48.92 427.32   2. Pneumonia of left lower lobe due to infectious organism  J18.9 486   3. JOYCE (acute kidney injury) (CMS/[facility])  N17.9 584.9   4. Oropharyngeal dysphagia  R13.12 787.22     Patient Active Problem List   Diagnosis   • Arthritis   • Chronic back pain   • Chronic anemia   • Anxiety and depression   • Type 2 diabetes mellitus with neurologic complication (CMS/[facility])   • Brittle diabetes mellitus (CMS/[facility])   • Dizzy   • Hyperlipidemia   • Hypertension   • Insomnia with sleep apnea   • Obstructive sleep apnea syndrome   • Peripheral neuropathy   • Diabetic gastroparesis (CMS/[facility])   • Primary localized osteoarthrosis of left shoulder region   • Rotator cuff tendonitis   • Acquired hypothyroidism   • Anxiety   • History of biliary T-tube placement   • CKD stage 3 due to type 2 diabetes mellitus (CMS/[facility])   • Complex tear of medial meniscus of right knee as current injury   • Insufficiency fracture of tibia   • Primary osteoarthritis of left knee   • Orthostatic hypotension   • Tendinitis of right rotator cuff   • AC joint arthropathy   • Subacromial impingement of right shoulder   • Right carpal tunnel syndrome   • Anemia requiring transfusions   • Monoclonal gammopathy of unknown significance (MGUS)   • Myelodysplastic syndrome with 5q deletion (CMS/[facility])   • History of deep venous thrombosis (DVT) of distal vein of left lower extremity   • Moderate episode of recurrent major depressive disorder (CMS/[facility])   • Chronic diastolic CHF (congestive heart failure) (CMS/[facility])   • Knee pain   • Primary osteoarthritis of right knee   • Iron overload,  transfusional   • Uncontrolled type 2 diabetes mellitus with hyperglycemia (CMS/[facility])   • Localized edema   • Type 2 diabetes mellitus with diabetic neuropathy (CMS/[facility])   • Vitamin D deficiency   • Subacromial bursitis of left shoulder joint   • Gastroesophageal reflux disease with esophagitis without hemorrhage   • Esophageal dysphagia   • Anemia in neoplastic disease   • Leukocytes in urine   • Atrial flutter (CMS/[facility])   • Moderate malnutrition (CMS/[facility])     Past Medical History:   Diagnosis Date   • Allergic rhinitis    • Anemia    • Anxiety    • Appetite absent    • Arthritis    • Asthma    • Back pain    • Bell's palsy    • Black tarry stools    • Blood in stool    • Chronic fatigue    • CKD (chronic kidney disease) stage 3, GFR 30-59 ml/min (CMS/[facility])    • Community acquired pneumonia of left lung 10/25/2018   • Cough    • Depression    • Diabetes mellitus (CMS/[facility])     LAST A1C 6   • Diabetic gastroparesis (CMS/[facility]) 2/19/2016   • Difficulty walking    • Excessive urination at night    • Frequent urination    • GERD (gastroesophageal reflux disease)    • GI bleed    • Gout    • H/O blood clots     LEFT LEG 7 OR 8 YEARS AGO   • Heat intolerance    • History of fall 10/2018   • History of prior pregnancies     x8, miscarriage 5   • History of transfusion 11/2018    due to anemia   • Hyperlipidemia    • Hypertension    • Hypothyroidism    • Normal coronary arteries     by cath 2013   • Orthostatic hypotension    • AARON (obstructive sleep apnea)     DOESNT WEAR REGULARLY   • Pneumonia    • PONV (postoperative nausea and vomiting)    • Skin cancer    • Stroke (CMS/[facility])     Several mini-strokes   • TIA (transient ischemic attack)     LAST TIA JULY 2017   • Urination pain    • UTI (urinary tract infection)     Dec 2018 and Jan 2019     Past Surgical History:   Procedure Laterality Date   • BACK SURGERY      HARDWARE   • CHOLECYSTECTOMY      OPEN   • COLONOSCOPY  2011    due for repeat in 2021   • COLONOSCOPY N/A  10/28/2018    Procedure: COLONOSCOPY;  Surgeon: Emmanuel Rogers MD;  Location:  [facility] OR;  Service: Gastroenterology   • ENDOSCOPY N/A 10/26/2018    Procedure: ESOPHAGOGASTRODUODENOSCOPY;  Surgeon: Emmanuel Rogers MD;  Location:  [facility] OR;  Service: Gastroenterology   • ENDOSCOPY N/A 5/13/2021    Procedure: ESOPHAGOGASTRODUODENOSCOPY, biopsy, dilatation;  Surgeon: Emmanuel Rogers MD;  Location: [facility] OR;  Service: Gastroenterology;  Laterality: N/A;  Esophagitis; Dilation- no stricture; Biopsy- esophagus   • HYSTERECTOMY      PARTIAL    • KNEE SURGERY     • NECK SURGERY     • SPINE SURGERY     • TUMOR REMOVAL Left     Leg   • UPPER GASTROINTESTINAL ENDOSCOPY  2014    gastritis.  done by dr. hastings            OT ASSESSMENT FLOWSHEET (last 12 hours)      OT Evaluation and Treatment     Row Name 08/11/21 0842                   OT Time and Intention    Subjective Information  complains of;nausea/vomiting  -EN        Document Type  therapy note (daily note)  -EN        Mode of Treatment  occupational therapy  -EN        Patient Effort  adequate  -EN        Symptoms Noted During/After Treatment  nausea  -EN        Comment  RN in room and gave meds for nausea  -EN           General Information    Patient/Family/Caregiver Comments/Observations  Patient reclined in bed, RN present. Agreed to OT.  -EN        Risks Reviewed  patient:;nausea/vomiting;LOB  -EN        Benefits Reviewed  patient:;improve function;increase independence;increase strength;increase balance  -EN        Barriers to Rehab  cognitive status  -EN           Pain Scale: Numbers Pre/Post-Treatment    Pre/Posttreatment Pain Comment  Patient did not c/o pain   -EN           Bed Mobility    Supine-Sit [place] (Bed Mobility)  contact guard;verbal cues  -EN           Functional Mobility    Functional Mobility- Ind. Level  contact guard assist  -EN        Functional Mobility- Device  rolling walker  -EN        Functional  Mobility-Distance (Feet)  20  -EN           Transfer Assessment/Treatment    Transfers  sit-stand transfer;stand-sit transfer;toilet transfer  -EN        Comment (Transfers)  v/cs for hand placement  -EN           Transfers    Sit-Stand [place] (Transfers)  contact guard;verbal cues  -EN        Stand-Sit [place] (Transfers)  contact guard;verbal cues  -EN        [place] Level (Toilet Transfer)  contact guard;verbal cues  -EN        Assistive Device (Toilet Transfer)  commode, bedside without drop arms;walker, front-wheeled  -EN           Sit-Stand Transfer    Assistive Device (Sit-Stand Transfers)  walker, front-wheeled  -EN           Stand-Sit Transfer    Assistive Device (Stand-Sit Transfers)  walker, front-wheeled  -EN           Toilet Transfer    Type (Toilet Transfer)  stand pivot/stand step  -EN           Balance    Comment, Balance  static standing X 2-3 minutes with CGA/FWW  -EN           Activities of Daily Living    BADL Assessment/Intervention  toileting  -EN           Lower Body Dressing Assessment/Training    [place] Level (Lower Body Dressing)  doff;don  -EN        Comment (Lower Body Dressing)  doffed/donned brief with mod assist.   -EN           Toileting Assessment/Training    [place] Level (Toileting)  toileting skills;perform perineal hygiene;standby assist  -EN           Plan of Care Review    Plan of Care Reviewed With  patient  -EN        Outcome Summary  OT- Patient performed supine to sit with CGA and sit to stand with CGA and verbal cues. Patient performed toilet transfer with CGA and performed hygiene with SBA. Required mod A for managing brief. Patient performed in room mobility with FWW and CGA. Patient less confused today and more cooperative with therapy.   -EN           Transfer Goal 1 (OT)    Activity/Assistive Device (Transfer Goal 1, OT)  toilet;commode, 3-in-1;walker, rolling  -EN        [place] Level/Cues Needed (Transfer Goal 1, OT)  standby assist   -EN        Time Frame (Transfer Goal 1, OT)  5 days  -EN        Progress/Outcome (Transfer Goal 1, OT)  continuing progress toward goal;goal ongoing  -EN           Bathing Goal 1 (OT)    Activity/Device (Bathing Goal 1, OT)  lower body bathing;long-handled sponge  -EN        [place] Level/Cues Needed (Bathing Goal 1, OT)  standby assist  -EN        Time Frame (Bathing Goal 1, OT)  5 days  -EN        Progress/Outcomes (Bathing Goal 1, OT)  continuing progress toward goal;goal ongoing  -EN           Dressing Goal 1 (OT)    Activity/Device (Dressing Goal 1, OT)  lower body dressing with long handled ae if needed  -EN        [place]/Cues Needed (Dressing Goal 1, OT)  standby assist  -EN        Time Frame (Dressing Goal 1, OT)  5 days  -EN        Progress/Outcome (Dressing Goal 1, OT)  continuing progress toward goal;goal ongoing  -EN           Positioning and Restraints    Pre-Treatment Position  in bed  -EN        Post Treatment Position  chair  -EN        In Chair  reclined;call light within reach;encouraged to call for assist;exit alarm on  -EN          User Key  (r) = Recorded By, (t) = Taken By, (c) = Cosigned By    Initials Name Effective Dates    MARIA FERNANDA Montaño Mildred JERAMY, OTR 06/16/21 -            Occupational Therapy Education                 Title: PT OT SLP Therapies (Done)     Topic: Occupational Therapy (Done)     Point: ADL training (Done)     Description:   Instruct learner(s) on proper safety adaptation and remediation techniques during self care or transfers.   Instruct in proper use of assistive devices.              Learning Progress Summary           Patient Acceptance, E, VU by MARIA FERNANDA at 8/11/2021 1023    Comment: Safety during transfers/mobility, ADL retraining    Acceptance, E, VU by EN at 8/10/2021 1127    Comment: Safety during functional transfers/mobility                   Point: Body mechanics (Done)     Description:   Instruct learner(s) on proper positioning and spine alignment during  self-care, functional mobility activities and/or exercises.              Learning Progress Summary           Patient Acceptance, E,TB, VU by EMILIA at 8/7/2021 1008    Comment: pt educated on adls, and safety with functional transfers                               User Key     Initials Effective Dates Name Provider Type Discipline    EN 06/16/21 -  Mildred Montaño, OTR Occupational Therapist OT    EMILIA 06/16/21 -  Haleigh Alarcon, OTNICK Occupational Therapist OT                  OT Recommendation and Plan     Progress Toward Functional Goals (OT): progress toward functional goals as expected  Plan of Care Review  Plan of Care Reviewed With: patient  Outcome Summary: OT- Patient performed supine to sit with CGA and sit to stand with CGA and verbal cues. Patient performed toilet transfer with CGA and performed hygiene with SBA. Required mod A for managing brief. Patient performed in room mobility with FWW and CGA. Patient less confused today and more cooperative with therapy.   Plan of Care Reviewed With: patient  Outcome Summary: OT- Patient performed supine to sit with CGA and sit to stand with CGA and verbal cues. Patient performed toilet transfer with CGA and performed hygiene with SBA. Required mod A for managing brief. Patient performed in room mobility with FWW and CGA. Patient less confused today and more cooperative with therapy.     Outcome Measures     Row Name 08/11/21 0842 08/10/21 1421 08/10/21 1100       How much help from another person do you currently need...    Turning from your back to your side while in flat bed without using bedrails?  --  3  -BP  --    Moving from lying on back to sitting on the side of a flat bed without bedrails?  --  3  -BP  --    Moving to and from a bed to a chair (including a wheelchair)?  --  3  -BP  --    Standing up from a chair using your arms (e.g., wheelchair, bedside chair)?  --  3  -BP  --    Climbing 3-5 steps with a railing?  --  2  -BP  --    To walk in  hospital room?  --  3  -BP  --    AM-PAC 6 Clicks Score (PT)  --  17  -BP  --       How much help from another is currently needed...    Putting on and taking off regular lower body clothing?  2  -EN  --  2  -EN    Bathing (including washing, rinsing, and drying)  2  -EN  --  2  -EN    Toileting (which includes using toilet bed pan or urinal)  2  -EN  --  2  -EN    Putting on and taking off regular upper body clothing  4  -EN  --  4  -EN    Taking care of personal grooming (such as brushing teeth)  4  -EN  --  4  -EN    Eating meals  4  -EN  --  4  -EN    AM-PAC 6 Clicks Score (OT)  18  -EN  --  18  -EN       Functional Assessment    Outcome Measure Options  --  AM-PAC 6 Clicks Basic Mobility (PT)  -BP  --    Row Name 08/09/21 0838             How much help from another person do you currently need...    Turning from your back to your side while in flat bed without using bedrails?  3  -JW      Moving from lying on back to sitting on the side of a flat bed without bedrails?  3  -JW      Moving to and from a bed to a chair (including a wheelchair)?  3  -JW      Standing up from a chair using your arms (e.g., wheelchair, bedside chair)?  3  -JW      Climbing 3-5 steps with a railing?  1  -JW      To walk in hospital room?  2  -JW      AM-PAC 6 Clicks Score (PT)  15  -JW         Functional Assessment    Outcome Measure Options  AM-PAC 6 Clicks Basic Mobility (PT)  -        User Key  (r) = Recorded By, (t) = Taken By, (c) = Cosigned By    Initials Name Provider Type    EN Mildred Montaño OTR Occupational Therapist    BP Olegario Silva, PT Physical Therapist    JW Steffany Noble, PT Physical Therapist          Time Calculation:   Time Calculation- OT     Row Name 08/11/21 1024             Time Calculation- OT    OT Start Time  0842  -EN      OT Stop Time  0906  -EN      OT Time Calculation (min)  24 min  -EN         Timed Charges    49523 - OT Self Care/Mgmt Minutes  24  -EN         Total Minutes    Timed  Charges Total Minutes  24  -EN       Total Minutes  24  -EN        User Key  (r) = Recorded By, (t) = Taken By, (c) = Cosigned By    Initials Name Provider Type    Mildred Mendez OTR Occupational Therapist        Therapy Charges for Today     Code Description Service Date Service Provider Modifiers Qty    [id number]  OT SELF CARE/MGMT/TRAIN EA 15 MIN 8/10/2021 Mildred Montaño OTR GO 2    [id number]  OT SELF CARE/MGMT/TRAIN EA 15 MIN 8/11/2021 Mildred Montaño OTR GO 2               ROLA Montoya  8/11/2021

## 2021-08-11 NOTE — TELEPHONE ENCOUNTER
Pt is currently admitted to [facility]. HGB 9.9.Pts family is asking if she will get her procrit injection. Since she is inpatient the attending physician will make that determination. They will also speak with her  about the care she will need when discharged to rehab. Pts daughter in-law V/U.

## 2021-08-11 NOTE — THERAPY TREATMENT NOTE
Acute Care - Physical Therapy Treatment Note  VICKY Sharma     Patient Name: Joya Singh  : 1940  MRN: [id number]  Today's Date: 2021   Onset of Illness/Injury or Date of Surgery: 21  Visit Dx:     ICD-10-CM ICD-9-CM   1. Atrial flutter, unspecified type (CMS/[facility])  I48.92 427.32   2. Pneumonia of left lower lobe due to infectious organism  J18.9 486   3. JOYCE (acute kidney injury) (CMS/[facility])  N17.9 584.9   4. Oropharyngeal dysphagia  R13.12 787.22     Patient Active Problem List   Diagnosis   • Arthritis   • Chronic back pain   • Chronic anemia   • Anxiety and depression   • Type 2 diabetes mellitus with neurologic complication (CMS/HCC)   • Brittle diabetes mellitus (CMS/[facility])   • Dizzy   • Hyperlipidemia   • Hypertension   • Insomnia with sleep apnea   • Obstructive sleep apnea syndrome   • Peripheral neuropathy   • Diabetic gastroparesis (CMS/[facility])   • Primary localized osteoarthrosis of left shoulder region   • Rotator cuff tendonitis   • Acquired hypothyroidism   • Anxiety   • History of biliary T-tube placement   • 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)   • Complex tear of medial meniscus of right knee as current injury   • Insufficiency fracture of tibia   • Primary osteoarthritis of left knee   • Orthostatic hypotension   • Tendinitis of right rotator cuff   • AC joint arthropathy   • Subacromial impingement of right shoulder   • Right carpal tunnel syndrome   • Anemia requiring transfusions   • Monoclonal gammopathy of unknown significance (MGUS)   • Myelodysplastic syndrome with 5q deletion (CMS/[facility])   • History of deep venous thrombosis (DVT) of distal vein of left lower extremity   • Moderate episode of recurrent major depressive disorder (CMS/HCC)   • Chronic diastolic CHF (congestive heart failure) (CMS/HCC)   • Knee pain   • Primary osteoarthritis of right knee   • Iron overload, transfusional   • Uncontrolled type 2 diabetes mellitus with hyperglycemia (CMS/[facility])   •  Localized edema   • Type 2 diabetes mellitus with diabetic neuropathy (CMS/[facility])   • Vitamin D deficiency   • Subacromial bursitis of left shoulder joint   • Gastroesophageal reflux disease with esophagitis without hemorrhage   • Esophageal dysphagia   • Anemia in neoplastic disease   • Leukocytes in urine   • Atrial flutter (CMS/HCC)   • Moderate malnutrition (CMS/[facility])     Past Medical History:   Diagnosis Date   • Allergic rhinitis    • Anemia    • Anxiety    • Appetite absent    • Arthritis    • Asthma    • Back pain    • Bell's palsy    • Black tarry stools    • Blood in stool    • Chronic fatigue    • CKD (chronic kidney disease) stage 3, GFR 30-59 ml/min (CMS/[facility])    • Community acquired pneumonia of left lung 10/25/2018   • Cough    • Depression    • Diabetes mellitus (CMS/[facility])     LAST A1C 6   • Diabetic gastroparesis (CMS/[facility]) 2/19/2016   • Difficulty walking    • Excessive urination at night    • Frequent urination    • GERD (gastroesophageal reflux disease)    • GI bleed    • Gout    • H/O blood clots     LEFT LEG 7 OR 8 YEARS AGO   • Heat intolerance    • History of fall 10/2018   • History of prior pregnancies     x8, miscarriage 5   • History of transfusion 11/2018    due to anemia   • Hyperlipidemia    • Hypertension    • Hypothyroidism    • Normal coronary arteries     by cath 2013   • Orthostatic hypotension    • AARON (obstructive sleep apnea)     DOESNT WEAR REGULARLY   • Pneumonia    • PONV (postoperative nausea and vomiting)    • Skin cancer    • Stroke (CMS/[facility])     Several mini-strokes   • TIA (transient ischemic attack)     LAST TIA JULY 2017   • Urination pain    • UTI (urinary tract infection)     Dec 2018 and Jan 2019     Past Surgical History:   Procedure Laterality Date   • BACK SURGERY      HARDWARE   • CHOLECYSTECTOMY      OPEN   • COLONOSCOPY  2011    due for repeat in 2021   • COLONOSCOPY N/A 10/28/2018    Procedure: COLONOSCOPY;  Surgeon: Emmanuel Rogers MD;  Location:   [facility];  Service: Gastroenterology   • ENDOSCOPY N/A 10/26/2018    Procedure: ESOPHAGOGASTRODUODENOSCOPY;  Surgeon: Emmanuel Rogers MD;  Location:  [facility];  Service: Gastroenterology   • ENDOSCOPY N/A 5/13/2021    Procedure: ESOPHAGOGASTRODUODENOSCOPY, biopsy, dilatation;  Surgeon: Emmanuel Rogers MD;  Location:  [facility];  Service: Gastroenterology;  Laterality: N/A;  Esophagitis; Dilation- no stricture; Biopsy- esophagus   • HYSTERECTOMY      PARTIAL    • KNEE SURGERY     • NECK SURGERY     • SPINE SURGERY     • TUMOR REMOVAL Left     Leg   • UPPER GASTROINTESTINAL ENDOSCOPY  2014    gastritis.  done by dr. hastings        PT Assessment (last 12 hours)      PT Evaluation and Treatment     Row Name 08/11/21 1053          Physical Therapy Time and Intention    Subjective Information  complains of;nausea/vomiting Patient complains of nausea, worsened following gt.   -BP     Document Type  therapy note (daily note)  -BP     Mode of Treatment  physical therapy  -BP     Patient Effort  adequate  -BP     Symptoms Noted During/After Treatment  nausea  -BP     Comment  Patient continues to complain of nausea at rest, worsened with mobility   -BP     Row Name 08/11/21 1058          General Information    Patient/Family/Caregiver Comments/Observations  Patient reclined in bedside chair. Patient agreeable to PT treatment. Patient alert and appropriate within conversation.   -BP     Risks Reviewed  patient:;LOB;nausea/vomiting;increased discomfort  -BP     Benefits Reviewed  patient:;improve function;increase independence;increase strength  -BP     Barriers to Rehab  cognitive status  -BP     Row Name 08/11/21 1051          Cognition    Orientation Status (Cognition)  oriented to;person;place;time  -BP     Personal Safety Interventions  gait belt;nonskid shoes/slippers when out of bed  -BP     Row Name 08/11/21 1058          Pain Scale: Numbers Pre/Post-Treatment    Pre/Posttreatment Pain Comment   "Patient complains of L shoulder pain but does not rate. Shoulder pain is chronic   -BP     Row Name 08/11/21 1058          Bed Mobility    Comment (Bed Mobility)  deferred, patient up in chair   -BP     Row Name 08/11/21 1058          Transfers    Transfers  sit-stand transfer;stand-sit transfer  -BP     Comment (Transfers)  verbal cues for hand placement   -BP     Sit-Stand Green (Transfers)  contact guard;verbal cues  -BP     Stand-Sit Green (Transfers)  contact guard;verbal cues  -BP     Row Name 08/11/21 1058          Sit-Stand Transfer    Assistive Device (Sit-Stand Transfers)  walker, front-wheeled  -BP     Row Name 08/11/21 1058          Stand-Sit Transfer    Assistive Device (Stand-Sit Transfers)  walker, front-wheeled  -BP     Row Name 08/11/21 1058          Gait/Stairs (Locomotion)    Green Level (Gait)  contact guard;verbal cues  -BP     Assistive Device (Gait)  walker, front-wheeled  -BP     Distance in Feet (Gait)  25  -BP     Pattern (Gait)  swing-through  -BP     Deviations/Abnormal Patterns (Gait)  gait speed decreased;stride length decreased  -BP     Bilateral Gait Deviations  forward flexed posture  -BP     Comment (Gait/Stairs)  Patient manages device without cues. Unable to progress gait distance due to patient reports of nausea, she states \"I'm so sick.\" Patient refuses further gait training due to nausea   -BP     Row Name 08/11/21 1058          Safety Issues, Functional Mobility    Safety Issues Affecting Function (Mobility)  insight into deficits/self-awareness;problem-solving;at risk behavior observed;awareness of need for assistance  -BP     Comment, Safety Issues/Impairments (Mobility)  Patient requires cues for safety throughout. Cognition improved today vs previous session however intermittent confusion persists.   -BP     Row Name 08/11/21 1058          Plan of Care Review    Plan of Care Reviewed With  patient  -BP     Outcome Summary  PT: Patient is oriented x 3 " "and follows one step commands. Patient more alert with intermittent confusion. Patient performs sit to/from stand transfers with CGA and gait x 25 feet with CGA with uses of FWW. Patient with nausea at rest, significantly increased following gait training. Patient refuses further gait training due to reports of nausea, she states \"I'm so sick.\" Continue to recommend 24/7 care at discharge, if this level of care is not available recommend SNF/ECF  -BP     Row Name 08/11/21 1058          Positioning and Restraints    Pre-Treatment Position  sitting in chair/recliner  -BP     Post Treatment Position  chair  -BP     In Chair  reclined;call light within reach;encouraged to call for assist;exit alarm on  -BP     Row Name 08/11/21 1058          Progress Summary (PT)    Progress Toward Functional Goals (PT)  progress toward functional goals is gradual  -BP     Barriers to Overall Progress (PT)  congition   -BP     Row Name 08/11/21 1058          Therapy Plan Review/Discharge Plan (PT)    Therapy Plan Review (PT)  patient;risks/benefits reviewed;participants included  -BP       User Key  (r) = Recorded By, (t) = Taken By, (c) = Cosigned By    Initials Name Provider Type    BP Olegario Silva, PT Physical Therapist        Physical Therapy Education                 Title: PT OT SLP Therapies (Done)     Topic: Physical Therapy (Done)     Point: Mobility training (Done)     Learning Progress Summary           Patient Acceptance, E,TB, VU,NR by  at 8/11/2021 1202    Acceptance, E,TB, VU,NR by  at 8/10/2021 1452    Acceptance, E,TB, VU by  at 8/9/2021 1129    Acceptance, E,TB, VU,NR by AS at 8/7/2021 1045                               User Key     Initials Effective Dates Name Provider Type Discipline     06/16/21 -  Olegario Silva, PT Physical Therapist PT    JW 06/16/21 -  Steffany Noble, PT Physical Therapist PT    AS 06/16/21 -  Yasmani Wilder, PT Physical Therapist PT              PT Recommendation and " "Plan  Anticipated Discharge Disposition (PT): home with 24/7 care, skilled nursing facility, extended care facility  Progress Summary (PT)  Progress Toward Functional Goals (PT): progress toward functional goals is gradual  Barriers to Overall Progress (PT): congition   Plan of Care Reviewed With: patient  Outcome Summary: PT: Patient is oriented x 3 and follows one step commands. Patient more alert with intermittent confusion. Patient performs sit to/from stand transfers with CGA and gait x 25 feet with CGA with uses of FWW. Patient with nausea at rest, significantly increased following gait training. Patient refuses further gait training due to reports of nausea, she states \"I'm so sick.\" Continue to recommend 24/7 care at discharge, if this level of care is not available recommend SNF/ECF  Outcome Measures     Row Name 08/11/21 1058 08/11/21 0842 08/10/21 1421       How much help from another person do you currently need...    Turning from your back to your side while in flat bed without using bedrails?  3  -BP  --  3  -BP    Moving from lying on back to sitting on the side of a flat bed without bedrails?  3  -BP  --  3  -BP    Moving to and from a bed to a chair (including a wheelchair)?  3  -BP  --  3  -BP    Standing up from a chair using your arms (e.g., wheelchair, bedside chair)?  3  -BP  --  3  -BP    Climbing 3-5 steps with a railing?  2  -BP  --  2  -BP    To walk in hospital room?  3  -BP  --  3  -BP    AM-PAC 6 Clicks Score (PT)  17  -BP  --  17  -BP       How much help from another is currently needed...    Putting on and taking off regular lower body clothing?  --  2  -EN  --    Bathing (including washing, rinsing, and drying)  --  2  -EN  --    Toileting (which includes using toilet bed pan or urinal)  --  2  -EN  --    Putting on and taking off regular upper body clothing  --  4  -EN  --    Taking care of personal grooming (such as brushing teeth)  --  4  -EN  --    Eating meals  --  4  -EN  --    " AM-PAC 6 Clicks Score (OT)  --  18  -EN  --       Functional Assessment    Outcome Measure Options  AM-PAC 6 Clicks Basic Mobility (PT)  -BP  --  AM-PAC 6 Clicks Basic Mobility (PT)  -BP    Row Name 08/10/21 1100 08/09/21 0838          How much help from another person do you currently need...    Turning from your back to your side while in flat bed without using bedrails?  --  3  -JW     Moving from lying on back to sitting on the side of a flat bed without bedrails?  --  3  -JW     Moving to and from a bed to a chair (including a wheelchair)?  --  3  -JW     Standing up from a chair using your arms (e.g., wheelchair, bedside chair)?  --  3  -JW     Climbing 3-5 steps with a railing?  --  1  -JW     To walk in hospital room?  --  2  -JW     AM-PAC 6 Clicks Score (PT)  --  15  -JW        How much help from another is currently needed...    Putting on and taking off regular lower body clothing?  2  -EN  --     Bathing (including washing, rinsing, and drying)  2  -EN  --     Toileting (which includes using toilet bed pan or urinal)  2  -EN  --     Putting on and taking off regular upper body clothing  4  -EN  --     Taking care of personal grooming (such as brushing teeth)  4  -EN  --     Eating meals  4  -EN  --     AM-PAC 6 Clicks Score (OT)  18  -EN  --        Functional Assessment    Outcome Measure Options  --  AM-PAC 6 Clicks Basic Mobility (PT)  -       User Key  (r) = Recorded By, (t) = Taken By, (c) = Cosigned By    Initials Name Provider Type    EN Mildred Montaño OTR Occupational Therapist    Olegario Wang, PT Physical Therapist    Steffany Cherry, CHET Physical Therapist           Time Calculation:   PT Charges     Row Name 08/11/21 1234             Time Calculation    Start Time  1058  -BP      Stop Time  1109  -BP      Time Calculation (min)  11 min  -BP      PT Received On  08/11/21  -BP      PT - Next Appointment  08/12/21  -BP         Timed Charges    27918 - Gait Training Minutes   11   -BP         Total Minutes    Timed Charges Total Minutes  11  -BP       Total Minutes  11  -BP        User Key  (r) = Recorded By, (t) = Taken By, (c) = Cosigned By    Initials Name Provider Type    Olegario Wang, PT Physical Therapist        Therapy Charges for Today     Code Description Service Date Service Provider Modifiers Qty    47380104727 HC GAIT TRAINING EA 15 MIN 8/10/2021 Olegario Silva, PT GP 1    11213111073 HC GAIT TRAINING EA 15 MIN 8/11/2021 Olegario Silva, PT GP 1          PT G-Codes  Outcome Measure Options: AM-PAC 6 Clicks Basic Mobility (PT)  AM-PAC 6 Clicks Score (PT): 17  AM-PAC 6 Clicks Score (OT): 18    Olegario Silva PT  8/11/2021

## 2021-08-11 NOTE — PLAN OF CARE
Goal Outcome Evaluation:  Plan of Care Reviewed With: patient        Progress: improving  Outcome Summary: Pt remains confused, oriented to self only. Up to chair, ambulate with walker. IV zofran given for nausea. Pt states she hasn't felt like eating, alternative food choices given but patient declined alternatives. Mag citrate given for constipation, no BM this shift. Eliquis increased.

## 2021-08-11 NOTE — PROGRESS NOTES
"      [facility] Medicine Services  INPATIENT PROGRESS NOTE  1406/1   Hospitalist Team  LOS 5 days      Patient Care Team:  Chari Mercado MD as PCP - General  Chari Mercado MD as PCP - Family Medicine  Christos Rob MD as Surgeon (General Surgery)  German Wylie MD as Surgeon (Orthopedic Surgery)  Yasmani Baugh MD as Consulting Physician (Nephrology)  Yasmani Baugh MD as Referring Physician (Nephrology)  Elieser Headley MD as Consulting Physician (Hematology and Oncology)        Chief Complaint / Subjective  Chief Complaint   Patient presents with   • Shortness of Breath       HPI (4 hpi elements or status of 3 chronic)  The patient is a 81-year-old female, known to hospitalist service from prior admissions, but presented to the emergency department secondary to persistent shortness of air.  She has seen her primary care provider Dr. Mercado multiple times for the same concerns/recurrent or refractory pneumonias, and was recently placed on Omnicef and a azithromycin on 8/5/2021 and prior to that cefdinir and then moxifloxacin and of June without resolution.  She has been using her oxygen most days due to the shortness of air.  She further reports recurrent syncopal sensation, palpitations, longstanding dizziness that has become worse, nonproductive cough, intermittent chest pain lasting approximately 15 minutes that is not relieved with rest, nasal congestion, sinus pressure, fatigue/malaise, dysuria and burning with urination, decreased appetite, generalized weakness, choking with eating that has become worse, early satiety and intermittent nausea without vomiting. She states \"I just want some quality of life\", as she has felt poorly for some time.      Diagnostics in ER revealed atrial flutter requiring diltiazem drip, persistent left lower lobe masslike opacity, increasing patchy infiltrates bilateral lung bases.  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     She denies f/c/headache/v/d/chest pain/abdominal pain/sick exposures/change in bowel or bladder habits/bloody emesis or bloody stools/change in medications or any other new concerns.  Patient complains of dizziness and confusion is better.  States dizziness is a chronic problem        History taken from: patient chart    Review of Systems   Constitutional: Negative.   HENT: Negative.    Eyes: Negative.    Cardiovascular: Negative.    Respiratory: Negative.    Endocrine: Negative.    Hematologic/Lymphatic: Negative.    Skin: Negative.    Musculoskeletal: Negative.    Gastrointestinal: Negative.    Genitourinary: Negative.    Neurological: Positive for dizziness.   Psychiatric/Behavioral: Negative.    Allergic/Immunologic: Negative.    All other systems reviewed and are negative.            Family History   Problem Relation Age of Onset   • Lupus Mother    • Heart failure Mother 59   • Heart disease Other    • Hypertension Other    • Heart attack Father    • Breast cancer Neg Hx        Past Medical History:   Diagnosis Date   • Allergic rhinitis    • Anemia    • Anxiety    • Appetite absent    • Arthritis    • Asthma    • Back pain    • Bell's palsy    • Black tarry stools    • Blood in stool    • Chronic fatigue    • CKD (chronic kidney disease) stage 3, GFR 30-59 ml/min (CMS/[facility])    • Community acquired pneumonia of left lung 10/25/2018   • Cough    • Depression    • Diabetes mellitus (CMS/[facility])     LAST A1C 6   • Diabetic gastroparesis (CMS/[facility])  2/19/2016   • Difficulty walking    • Excessive urination at night    • Frequent urination    • GERD (gastroesophageal reflux disease)    • GI bleed    • Gout    • H/O blood clots     LEFT LEG 7 OR 8 YEARS AGO   • Heat intolerance    • History of fall 10/2018   • History of prior pregnancies     x8, miscarriage 5   • History of transfusion 11/2018    due to anemia   • Hyperlipidemia    • Hypertension    • Hypothyroidism    • Normal coronary arteries     by cath 2013   • Orthostatic hypotension    • AARON (obstructive sleep apnea)     DOESNT WEAR REGULARLY   • Pneumonia    • PONV (postoperative nausea and vomiting)    • Skin cancer    • Stroke (CMS/HCC)     Several mini-strokes   • TIA (transient ischemic attack)     LAST TIA JULY 2017   • Urination pain    • UTI (urinary tract infection)     Dec 2018 and Jan 2019       Social History     Socioeconomic History   • Marital status:      Spouse name: Not on file   • Number of children: 3   • Years of education: High School   • Highest education level: Not on file   Tobacco Use   • Smoking status: Never Smoker   • Smokeless tobacco: Never Used   • Tobacco comment: CAFFEINE USE: NONE   Vaping Use   • Vaping Use: Never used   Substance and Sexual Activity   • Alcohol use: No   • Drug use: No   • Sexual activity: Defer     Comment: EXERCISE - RARELY       Prior to Admission medications    Medication Sig Start Date End Date Taking? Authorizing Provider   ACCU-CHEK FASTCLIX LANCETS misc TEST 3-4 TIMES DAILY AS DIRECTED 10/1/18  Yes Chari Mercado MD   ACCU-CHEMONALISA SMARTVIEW test strip TEST BLOOD SUGAR THREE TIMES DAILY OR AS DIRECTED 5/22/19  Yes Chari Mercado MD   acetaminophen (TYLENOL) 325 MG tablet Take 2 tablets by mouth Every 6 (Six) Hours As Needed for Mild Pain . OTC product 11/6/17  Yes Alison Olivera APRN   albuterol (PROVENTIL) (2.5 MG/3ML) 0.083% nebulizer solution Take 2.5 mg by nebulization Every 4 (Four) Hours As Needed for  Wheezing. 3/9/21  Yes Chari Mercado MD   apixaban (Eliquis) 5 MG tablet tablet Take 1 tablet by mouth 2 (Two) Times a Day. 6/23/21  Yes Elieser Headley MD   atorvastatin (LIPITOR) 10 MG tablet TAKE ONE TABLET BY MOUTH AT BEDTIME 4/9/21  Yes Chari Mercado MD   azithromycin (Zithromax) 250 MG tablet Take 2 tablets the first day, then 1 tablet daily for 4 days. 8/5/21  Yes Chari Mercado MD   Blood Glucose Monitoring Suppl (ACCU-CHEK KENNETH SMARTVIEW) w/Device kit TEST blood sugar three times daily or as directed 1/18/18  Yes Chari Mercado MD   cefdinir (OMNICEF) 300 MG capsule Take 1 capsule by mouth 2 (Two) Times a Day for 10 days. 8/5/21 8/15/21 Yes Chari Mercado MD   cyclobenzaprine (FLEXERIL) 10 MG tablet TAKE ONE TABLET BY MOUTH THREE TIMES DAILY as needed for muscle spasms 7/19/21  Yes Keren Vences APRN   desvenlafaxine (PRISTIQ) 50 MG 24 hr tablet TAKE ONE TABLET BY MOUTH EVERY DAY 4/9/21  Yes Chari Mercado MD   Diclofenac Sodium (Voltaren) 1 % gel gel Apply 4 g topically to the appropriate area as directed 4 (Four) Times a Day As Needed (shoulder pain). 12/29/20  Yes Chari Mercado MD   dicyclomine (Bentyl) 10 MG capsule Take 1 cap po q 8 hours prn spasm 12/2/20  Yes Chari Mercado MD   famotidine (PEPCID) 40 MG tablet Take 1 tablet by mouth 2 (Two) Times a Day. With lunch and at bedtime 5/7/21  Yes Liyah Abel APRN   furosemide (LASIX) 20 MG tablet TAKE ONE TABLET BY MOUTH EVERY DAY Take ONE additional pill daily as needed for swelling 6/21/21  Yes Chari Mercado MD   gabapentin (NEURONTIN) 400 MG capsule TAKE ONE CAPSULE BY MOUTH EVERY MORNING, AT NOON, AND TWO AT BEDTIME 4/9/21  Yes Chari Mercado MD   HYDROcodone-acetaminophen (NORCO) 5-325 MG per tablet TAKE ONE TABLET BY MOUTH every 12 hours AS NEEDED FOR SEVERE PAIN 6/18/21  Yes Joie Brady APRN   insulin aspart (novoLOG) 100  "UNIT/ML injection Inject 5 Units under the skin into the appropriate area as directed 3 (Three) Times a Day With Meals. 1/29/20  Yes Chari Mercado MD   levothyroxine (SYNTHROID, LEVOTHROID) 88 MCG tablet TAKE ONE TABLET BY MOUTH EVERY DAY 6/25/21  Yes Chari Mercado MD   loratadine (Claritin) 10 MG tablet Take 10 mg by mouth Daily.   Yes Vimal Antonio MD   midodrine (PROAMATINE) 2.5 MG tablet TAKE ONE TABLET BY MOUTH THREE TIMES DAILY 6/25/21  Yes Kehinde Sanon MD   Mirabegron ER (Myrbetriq) 25 MG tablet sustained-release 24 hour 24 hr tablet Take 1 tablet by mouth Daily. 2/24/21  Yes Chari Mercado MD   Nebulizer device 1 each Take As Directed. 3/9/21  Yes Chari Mercado MD   Needle, Disp, (BD DISP NEEDLES) 30G X 1/2\" misc To be used 3 times daily with Novolog Flexpen. 8/4/16  Yes Chari Mercado MD   nitrofurantoin, macrocrystal-monohydrate, (MACROBID) 100 MG capsule Take 100 mg by mouth Daily. 3/12/21  Yes Vimal Antonio MD   nitrofurantoin, macrocrystal-monohydrate, (MACROBID) 100 MG capsule Take 100 mg by mouth 2 (Two) Times a Day.   Yes Vimal Antonio MD   O2 (OXYGEN) Inhale 2 L/min Every Night. Uses 2L  overnight only   Yes Vimal Antonio MD   omeprazole (priLOSEC) 40 MG capsule Take 1 capsule by mouth 2 (two) times a day. 5/7/21  Yes Liyah Abel APRN   ondansetron (ZOFRAN) 8 MG tablet Take 1 tablet by mouth 3 (Three) Times a Day As Needed for Nausea or Vomiting. 9/18/20  Yes Elieser Headley MD   phenazopyridine (PYRIDIUM) 200 MG tablet Take 200 mg by mouth 3 (Three) Times a Day. 7/31/20  Yes Vimal Antonio MD   potassium chloride 10 MEQ CR tablet TAKE ONE TABLET BY MOUTH EVERY DAY 4/9/21  Yes Chari Mercado MD   sennosides-docusate (senna-docusate sodium) 8.6-50 MG per tablet Take 2 tablets by mouth Daily. 12/2/20  Yes Rogelio, Chari Chicas MD   Tresiba FlexTouch 200 UNIT/ML solution pen-injector " "pen injection INJECT 54 UNITS subcutaneously AT BEDTIME 6/14/21  Yes Joie Brady APRN   UltiCare Mini Pen Needles 31G X 6 MM misc USE TO inject insulins FOUR TIMES DAILY AS DIRECTED 10/5/20  Yes Chari Mercado MD   Ventolin  (90 Base) MCG/ACT inhaler INHALE TWO PUFFS BY MOUTH EVERY 4 HOURS AS NEEDED FOR WHEEZING 12/29/20  Yes Chari Mercado MD   nystatin (MYCOSTATIN) 983688 UNIT/GM cream Apply  topically to the appropriate area as directed 2 (two) times a day. 4/28/21   Chari Mercado MD        Objective    Physical Exam     Vital Signs  Temp:  [97.6 °F (36.4 °C)-98.1 °F (36.7 °C)] 97.6 °F (36.4 °C)  Heart Rate:  [64-75] 64  Resp:  [18] 18  BP: (141-156)/(53-80) 141/58    Oxygen Therapy  SpO2: 92 %  Pulse Oximetry Type: Intermittent  Device (Oxygen Therapy): room air  Flow (L/min): 2  Flowsheet Rows      First Filed Value   Admission Height  154.9 cm (61\") Documented at 08/06/2021 0600   Admission Weight  87 kg (191 lb 12.8 oz) Documented at 08/06/2021 0600        Weight change:    Intake & Output (last 3 days)       08/08 0701 - 08/09 0700 08/09 0701 - 08/10 0700 08/10 0701 - 08/11 0700 08/11 0701 - 08/12 0700    P.O. 720 520 600 480    Total Intake(mL/kg) 720 (8.1) 520 (6.1) 600 (7.1) 480 (5.6)    Urine (mL/kg/hr) 600 (0.3)       Total Output 600       Net +120 +520 +600 +480            Urine Unmeasured Occurrence 2 x 2 x 4 x         Lines, Drains & Airways    Active LDAs     Name:   Placement date:   Placement time:   Site:   Days:    Peripheral IV 08/06/21 0620 Right Antecubital   08/06/21 0620    Antecubital   5                Physical Exam:    Physical Exam  Vitals and nursing note reviewed.   Constitutional:       General: She is not in acute distress.     Appearance: Normal appearance. She is well-developed. She is not ill-appearing, toxic-appearing or diaphoretic.   HENT:      Head: Normocephalic and atraumatic.      Right Ear: Ear canal and external ear normal. "      Left Ear: Ear canal and external ear normal.      Nose: Nose normal. No congestion or rhinorrhea.      Mouth/Throat:      Mouth: Mucous membranes are moist.      Pharynx: No oropharyngeal exudate.   Eyes:      General: No scleral icterus.        Right eye: No discharge.         Left eye: No discharge.      Extraocular Movements: Extraocular movements intact.      Conjunctiva/sclera: Conjunctivae normal.      Pupils: Pupils are equal, round, and reactive to light.   Neck:      Thyroid: No thyromegaly.      Vascular: No carotid bruit or JVD.      Trachea: No tracheal deviation.   Cardiovascular:      Rate and Rhythm: Normal rate and regular rhythm.      Pulses: Normal pulses.      Heart sounds: Normal heart sounds. No murmur heard.   No friction rub. No gallop.    Pulmonary:      Effort: Pulmonary effort is normal. No respiratory distress.      Breath sounds: Normal breath sounds. No stridor. No wheezing, rhonchi or rales.   Chest:      Chest wall: No tenderness.   Abdominal:      General: Bowel sounds are normal. There is no distension.      Palpations: Abdomen is soft. There is no mass.      Tenderness: There is no abdominal tenderness. There is no guarding or rebound.      Hernia: No hernia is present.   Musculoskeletal:         General: No swelling, tenderness, deformity or signs of injury. Normal range of motion.      Cervical back: Normal range of motion and neck supple. No rigidity. No muscular tenderness.      Right lower leg: No edema.      Left lower leg: No edema.   Lymphadenopathy:      Cervical: No cervical adenopathy.   Skin:     General: Skin is warm and dry.      Coloration: Skin is not jaundiced or pale.      Findings: No bruising, erythema or rash.   Neurological:      General: No focal deficit present.      Mental Status: She is alert. Mental status is at baseline.      Cranial Nerves: No cranial nerve deficit.      Sensory: No sensory deficit.      Motor: No weakness or abnormal muscle tone.       Coordination: Coordination normal.   Psychiatric:         Mood and Affect: Mood normal.         Behavior: Behavior normal.         Thought Content: Thought content normal.         Judgment: Judgment normal.                   PT Recommendation and Plan             Procedures:    * No surgery found *     Assessment/Plan with Problem wise       Active Hospital Problems    Diagnosis  POA   • Chronic diastolic CHF (congestive heart failure) (CMS/[facility]) [I50.32]  Yes     Priority: Medium   • CKD stage 3 due to type 2 diabetes mellitus (CMS/[facility]) [E11.22, N18.30]  Yes     Priority: Medium   • Atrial flutter (CMS/[facility]) [I48.92]  Yes   • Moderate malnutrition (CMS/[facility]) [E44.0]  Yes   • Myelodysplastic syndrome with 5q deletion (CMS/[facility]) [D46.C]  Yes   • Monoclonal gammopathy of unknown significance (MGUS) [D47.2]  Yes   • Diabetic gastroparesis (CMS/[facility]) [E11.43, K31.84]  Yes   • Type 2 diabetes mellitus with neurologic complication (CMS/[facility]) [E11.49]  Yes   • Mixed hyperlipidemia [E78.2]  Yes   • Essential hypertension [I10]  Yes      Resolved Hospital Problems   No resolved problems to display.        Estimated Creatinine Clearance: 27.4 mL/min (A) (by C-G formula based on SCr of 1.65 mg/dL (H)).    Code Status and Medical Interventions:   Ordered at: 08/06/21 0730     Level Of Support Discussed With:    Patient     Code Status:    CPR     Medical Interventions (Level of Support Prior to Arrest):    Full       MEDICAL DECISION MAKING COMPLEXITY BY PROBLEM:       CHF:  Diuresis-loop diuretics  Spironolactone if tolerated  Thiazides if tolerated  ACE inhibitor if tolerated  Beta-blocker  Possible SGLT2 inhibitor trial if EF<40% (EMPEROR reduced)  Check echocardiogram      Diabetes mellitus:  Monitor blood sugars  Basal bolus insulin  Sliding scale as needed  Diabetic education as needed  Supportive treatment  Nursing do not hold long-acting insulin without orders       Renal  failure/insufficiency/injury:  Hydration  Supportive treatment  Cut down or hold ACE inhibitors  Avoid nephrotoxic medications   Address diuretics      MDS  Supportive care      Hyperlipidemia:  Check lipid panel  Statins if indicated  Add Ezetimibe if appropriate  Only Icosapent Ethyl (omega-3) demonstrated efficacy in Hypertriglyceridemia. (STRENGTH, REDUCE IT trials)    Hypertension:  Continue home medications   Options include-  beta-blockers  Calcium channel blockers  ACE inhibitor  Vasodilators  Low-dose diuretics  PRN medications have not been shown to affect outcomes-to be avoided        Delirium and dementia  Minimize polypharmacy  Continue home medication    Care coordination with nursing for current care.  Awaiting placement  Patient new to me and extensive chart review needed 08/11/21 3:16 PM EDT.    Plan for disposition:  anticipate pt will need 30 days or less of rehab            Continued Care and Services - Admitted Since 8/6/2021     Destination     Service Provider Request Status Selected Services Address Phone Fax Patient Preferred    [facility]  [facility] [address] [phone] --               Historical & Objective Data     Results Review:    I reviewed the patient's new lab and radiology results. 08/11/21     Results from last 7 days   Lab Units 08/11/21  0429 08/09/21  0503 08/07/21  0509   WBC 10*3/mm3 3.56 3.83 5.72   HEMOGLOBIN g/dL 9.9* 9.6* 9.3*   HEMATOCRIT % 32.3* 30.8* 30.7*   MCV fL 96.4 97.2* 97.5*   MCH pg 29.6 30.3 29.5   MPV fL 11.8 12.4* 12.4*   RDW % 20.8* 20.9* 22.1*   PLATELETS 10*3/mm3 263 237 235     Results from last 7 days   Lab Units 08/11/21  0429 08/10/21  0921 08/09/21  0503 08/07/21  0509 08/06/21  0603   SODIUM mmol/L 143 142 139   < > 136   POTASSIUM mmol/L 4.7 4.9 5.1   < > 4.9   CHLORIDE mmol/L 110* 109* 108*   < > 102   CO2 mmol/L 28.3 26.8 24.0   < > 26.2   BUN mg/dL 29* 32* 42*   < > 48*   CREATININE mg/dL  1.65* 1.74* 2.21*   < > 2.68*   CALCIUM mg/dL 9.1 9.2 9.0   < > 9.1   MAGNESIUM mg/dL  --   --   --   --  2.3   BILIRUBIN mg/dL 0.5  --   --   --  0.7   ALK PHOS U/L 160*  --   --   --  169*   ALT (SGPT) U/L 15  --   --   --  16   AST (SGOT) U/L 14  --   --   --  18   GLUCOSE mg/dL 120* 202* 213*   < > 96    < > = values in this interval not displayed.     Inflammatory Biomarkers        Invalid input(s): ESR, D-DIMER QUANTITATIVE,  PROCALCITONIN,  alllabslink(lactate:5)@ )@  Lab Results   Component Value Date    PHOS 3.1 08/10/2021     No results found for: HGBA1C  Lab Results   Component Value Date    CHLPL 91 (L) 01/12/2021    TRIG 57 01/12/2021    HDL 42 01/12/2021    LDL 36 01/12/2021     Lab Results   Component Value Date    LIPASE 29 10/26/2018     Results from last 7 days   Lab Units 08/06/21  0603   INR  1.58*           Pathology  Lab Results   Lab Value Date/Time    FINALDX  05/13/2021 1502     1. Esophagus, Distal, Biopsy:  Benign squamous and gastric mucosa with   A. Intestinal metaplasia consistent with Gonzalez's esophagus, reactive changes and no dysplasia.   B. Chronic inflammation of the gastric mucosa.    Clint/kds      FINALDX  03/06/2019 1115     1. Core Biopsy, Right Iliac Crest:   A. Normal overall cellularity approximating 35%.   B. Erythroid hyperplasia with megaloblastoid change.   C. The myeloid series is well represented with normal maturation.   D. The myeloid to erythroid ratio approximates 2:1.   E. Megakaryocytes are increased in number with frequent micromegakaryocytes.   F. Scattered lymphoid aggregates.   G. Morphologically normal plasma cells encompassing up to 8% of the overall cellularity.   H. Negative for excess blasts.   I.  No cells extrinsic to the marrow are identified.   J. Special stains are negative for increased iron stores.   K. Reticulin and trichrome stains are unremarkable.   L. Special stains to follow.    2. Bone Marrow Aspiration Smears and Clot, Right Iliac  Crest:   A. Normal overall cellularity approximating 35%.   B. Erythroid hyperplasia with megaloblastoid change.   C. The myeloid series is well represented with normal maturation.   D. The myeloid to erythroid ratio approximates 2:1.   E. Megakaryocytes are increased in number with frequent micromegakaryocytes.   F. Scattered lymphoid aggregates.   G. Morphologically normal plasma cells encompassing up to 8% of the overall cellularity.   H. Negative for excess blasts.   I.  No cells extrinsic to the marrow are identified.   J. Special stains are negative for increased iron stores.   K. Reticulin and trichrome stains are unremarkable.   L. Special stains to follow.    [facility]/kds      COMDX  03/06/2019 1115     The prevalent findings in this bone marrow biopsy are an erythroid hyperplasia with megaloblastic change and increased megakaryocytes with micromegakaryocytes and an increase in plasma cells.  No significant dysplastic change is identified.  No evidence of excess blasts is present.  The overall pattern is that of a macrocytic anemia with increased megakaryocytes and a significant plasmacytosis.  A low grade myelodysplastic syndrome cannot be excluded.  Flow cytometric and cytogenetic studies to follow.  Drug effect can likewise give similar changes. Serum protien electrophoresis is recommended.  This case was seen in consultation with Dr. Montelongo, who concurs.             [facility]/kds       COVID19   Date Value Ref Range Status   08/06/2021 Not Detected Not Detected - Ref. Range Final        Microbiology Results (last 10 days)     Procedure Component Value - Date/Time    MRSA Screen Culture (Outpatient) - Swab, Nares [[id number]]  (Normal) Collected: 08/06/21 1427    Lab Status: Final result Specimen: Swab from Nares Updated: 08/07/21 1926     MRSA Screen Cx No Methicillin Resistant Staphylococcus aureus isolated    Respiratory Panel, PCR (WITHOUT COVID) - Swab, Nasopharynx [[id number]]  (Normal) Collected: 08/06/21 1028     Lab Status: Final result Specimen: Swab from Nasopharynx Updated: 08/06/21 1705     ADENOVIRUS, PCR Not Detected     Coronavirus 229E Not Detected     Coronavirus HKU1 Not Detected     Coronavirus NL63 Not Detected     Coronavirus OC43 Not Detected     Human Metapneumovirus Not Detected     Human Rhinovirus/Enterovirus Not Detected     Influenza B PCR Not Detected     Parainfluenza Virus 1 Not Detected     Parainfluenza Virus 2 Not Detected     Parainfluenza Virus 3 Not Detected     Parainfluenza Virus 4 Not Detected     Bordetella pertussis pcr Not Detected     Chlamydophila pneumoniae PCR Not Detected     Mycoplasma pneumo by PCR Not Detected     Influenza A PCR Not Detected     RSV, PCR Not Detected     Bordetella parapertussis PCR Not Detected    Narrative:      The coronavirus on the RVP is NOT COVID-19 and is NOT indicative of infection with COVID-19.    In the setting of a positive respiratory panel with a viral infection PLUS a negative procalcitonin without other underlying concern for bacterial infection, consider observing off antibiotics or discontinuation of antibiotics and continue supportive care. If the respiratory panel is positive for atypical bacterial infection (Bordetella pertussis, Chlamydophila pneumoniae, or Mycoplasma pneumoniae), consider antibiotic de-escalation to target atypical bacterial infection.    Blood Culture - Blood, Hand, Left [[id number]] Collected: 08/06/21 0740    Lab Status: Final result Specimen: Blood from Hand, Left Updated: 08/11/21 0745     Blood Culture No growth at 5 days    COVID-19,[facility] IN-HOUSE,Nasal Swab No Transport Media 3-4 HR TAT - Swab, Nasal Cavity [[id number]]  (Normal) Collected: 08/06/21 0623    Lab Status: Final result Specimen: Swab from Nasal Cavity Updated: 08/06/21 0709     COVID19 Not Detected    Narrative:      Fact sheet for providers: https://www.fda.gov/media/857203/download     Fact sheet for patients:  https://www.fda.gov/media/050961/download    Test performed by PCR.    Consider negative results in combination with clinical observations, patient history, and epidemiological information.    Blood Culture - Blood, Arm, Right [[id number]] Collected: 08/06/21 0603    Lab Status: Final result Specimen: Blood from Arm, Right Updated: 08/11/21 0715     Blood Culture No growth at 5 days          ECG/EMG Results (most recent)     Procedure Component Value Units Date/Time    ECG 12 Lead [[id number]] Collected: 08/06/21 1339     Updated: 08/06/21 1448     QT Interval 420 ms     Narrative:      HEART RATE= 66  bpm  RR Interval= 904  ms  HI Interval= 178  ms  P Horizontal Axis= 7  deg  P Front Axis= 59  deg  QRSD Interval= 149  ms  QT Interval= 420  ms  QRS Axis= -64  deg  T Wave Axis= 87  deg  - ABNORMAL ECG -  Sinus rhythm  RBBB and LAFB  c/w prior ecg, SR is now seen  Electronically Signed By: Neetu Chapin ([facility]) 06-Aug-2021 14:47:25  Date and Time of Study: 2021-08-06 13:39:31    ECG 12 Lead [[id number]] Collected: 08/06/21 1001     Updated: 08/06/21 1448     QT Interval 382 ms     Narrative:      HEART RATE= 128  bpm  RR Interval= 479  ms  HI Interval=   ms  P Horizontal Axis=   deg  P Front Axis=   deg  QRSD Interval= 152  ms  QT Interval= 382  ms  QRS Axis= 4  deg  T Wave Axis= 35  deg  - ABNORMAL ECG -  Atrial flutter with predominant 2:1 AV block  Right bundle branch block  Inferior infarct, old  No change from prior tracing  Electronically Signed By: Neetu Chapin ([facility]) 06-Aug-2021 14:47:37  Date and Time of Study: 2021-08-06 10:01:18    ECG 12 Lead [[id number]] Collected: 08/06/21 0600     Updated: 08/06/21 1448     QT Interval 330 ms     Narrative:      HEART RATE= 133  bpm  RR Interval= 452  ms  HI Interval=   ms  P Horizontal Axis=   deg  P Front Axis=   deg  QRSD Interval= 142  ms  QT Interval= 330  ms  QRS Axis= -67  deg  T Wave Axis= 9  deg  - ABNORMAL ECG -  Atrial flutter with predominant 2:1 AV  block  Right bundle branch block  c/w prior ecg, atrial flutter is now seen  Electronically Signed By: Neetu Chapin ([facility]) 06-Aug-2021 14:47:59  Date and Time of Study: 2021-08-06 06:00:40    Adult Transthoracic Echo Complete W/ Cont if Necessary Per Protocol [[id number]] Collected: 08/06/21 1342     Updated: 08/06/21 1535     BSA 1.9 m^2      IVSd 1.4 cm      LVIDd 4.3 cm      LVIDs 2.3 cm      LVPWd 1.1 cm      IVS/LVPW 1.2     FS 47.1 %      EDV(Teich) 84.4 ml      ESV(Teich) 17.9 ml      EF(Teich) 78.8 %      EDV(cubed) 81.2 ml      ESV(cubed) 12.0 ml      EF(cubed) 85.2 %      LV mass(C)d 197.0 grams      LV mass(C)dI 103.4 grams/m^2      SV(Teich) 66.5 ml      SI(Teich) 34.9 ml/m^2      SV(cubed) 69.2 ml      SI(cubed) 36.3 ml/m^2      Ao root diam 2.8 cm      Ao root area 6.2 cm^2      asc Aorta Diam 3.4 cm      LVOT diam 1.8 cm      LVOT area 2.5 cm^2      LVOT area(traced) 2.5 cm^2      LVLd ap4 7.0 cm      EDV(MOD-sp4) 78.4 ml      LVLs ap4 5.8 cm      ESV(MOD-sp4) 23.9 ml      EF(MOD-sp4) 69.5 %      LVLd ap2 6.6 cm      EDV(MOD-sp2) 50.0 ml      LVLs ap2 6.0 cm      ESV(MOD-sp2) 22.1 ml      EF(MOD-sp2) 55.8 %      SV(MOD-sp4) 54.5 ml      SI(MOD-sp4) 28.6 ml/m^2      SV(MOD-sp2) 27.9 ml      SI(MOD-sp2) 14.6 ml/m^2      Ao root area (BSA corrected) 1.5     LV Borden Vol (BSA corrected) 41.2 ml/m^2      LV Sys Vol (BSA corrected) 12.5 ml/m^2      TAPSE (>1.6) 1.6 cm      EF(MOD-bp) 63.8 %      MV E max keith 112.5 cm/sec      MV A max keith 59.1 cm/sec      MV E/A 1.9     MV V2 mean 82.8 cm/sec      MV mean PG 3.0 mmHg      MV V2 VTI 15.3 cm      MVA(VTI) 2.6 cm^2      MV P1/2t max keith 133.0 cm/sec      MV P1/2t 45.8 msec      MVA(P1/2t) 4.8 cm^2      MV dec slope 851.0 cm/sec^2      MV dec time 150 sec      Ao pk keith 160.0 cm/sec      Ao max PG 10.2 mmHg      Ao max PG (full) 4.1 mmHg      Ao V2 mean 116.0 cm/sec      Ao mean PG 6.0 mmHg      Ao mean PG (full) 3.0 mmHg      Ao V2 VTI 23.6 cm      KIKI(I,A)  1.7 cm^2      KIKI(I,D) 1.7 cm^2      KIKI(V,A) 2.0 cm^2      KIKI(V,D) 2.0 cm^2      LV V1 max PG 6.2 mmHg      LV V1 mean PG 3.0 mmHg      LV V1 max 124.0 cm/sec      LV V1 mean 77.7 cm/sec      LV V1 VTI 15.6 cm      SV(Ao) 145.3 ml      SI(Ao) 76.3 ml/m^2      SV(LVOT) 39.7 ml      SI(LVOT) 20.8 ml/m^2      PA V2 max 109.0 cm/sec      PA max PG 4.8 mmHg      PA acc time 0.06 sec      RV V1 mean PG 1.0 mmHg      RV V1 mean 38.9 cm/sec      RV V1 VTI 8.8 cm      RAP systole 3.0 mmHg      PA pr(Accel) 52.9 mmHg      MVA P1/2T LCG 1.7 cm^2      RV Base 3.1 cm      RV Length 6.3 cm      RV Mid 2.3 cm      RV S' 13.6 cm/sec       CV ECHO EDUARDO - BZI_BMI 38.5 kilograms/m^2       CV ECHO EDUARDO - BSA(HAYCOCK) 2.0 m^2       CV ECHO EDUARDO - BZI_METRIC_WEIGHT 92.5 kg       CV ECHO EDUARDO - BZI_METRIC_HEIGHT 154.9 cm      Target HR (85%) 120 bpm      Max. Pred. HR (100%) 141 bpm       CV VAS BP RIGHT /52 mmHg      Dimensionless Index 0.70 (DI)      LA Volume Index 22.0 mL/m2      EF - Contrast (4Ch) 64.0 cm2     Narrative:      · Left ventricular wall thickness is consistent with mild to moderate   septal asymmetric hypertrophy.  · Calculated left ventricular EF = 63.8% Estimated left ventricular EF was   in agreement with the calculated left ventricular EF. Left ventricular   systolic function is normal.  · Mildly reduced right ventricular systolic function noted.       ECG 12 Lead [[id number]] Collected: 08/07/21 1013     Updated: 08/07/21 1319     QT Interval 416 ms     Narrative:      HEART RATE= 70  bpm  RR Interval= 856  ms  DC Interval= 180  ms  P Horizontal Axis= -24  deg  P Front Axis= 47  deg  QRSD Interval= 151  ms  QT Interval= 416  ms  QRS Axis= -37  deg  T Wave Axis= 40  deg  - ABNORMAL ECG -  Sinus rhythm  Right bundle branch block  NO SIGNIFICANT CHANGE FROM PREVIOUS ECG  Electronically Signed By: Pedro Jamison ([facility]) 07-Aug-2021 13:18:51  Date and Time of Study: 2021-08-07 10:13:18    ECG 12 Lead  [[id number]] Collected: 08/08/21 0813     Updated: 08/08/21 0958     QT Interval 458 ms     Narrative:      HEART RATE= 63  bpm  RR Interval= 948  ms  NE Interval= 192  ms  P Horizontal Axis= -11  deg  P Front Axis= 60  deg  QRSD Interval= 159  ms  QT Interval= 458  ms  QRS Axis= -32  deg  T Wave Axis= -11  deg  - ABNORMAL ECG -  Sinus rhythm  Right bundle branch block  NO SIGNIFICANT CHANGE FROM PREVIOUS ECG  Electronically Signed By: Pedro Jamison ([facility]) 08-Aug-2021 09:57:53  Date and Time of Study: 2021-08-08 08:13:09    ECG 12 Lead [[id number]] Collected: 08/09/21 0627     Updated: 08/09/21 1010     QT Interval 445 ms     Narrative:      HEART RATE= 63  bpm  RR Interval= 952  ms  NE Interval= 200  ms  P Horizontal Axis= 0  deg  P Front Axis= 51  deg  QRSD Interval= 154  ms  QT Interval= 445  ms  QRS Axis= -67  deg  T Wave Axis= 23  deg  - ABNORMAL ECG -  Sinus rhythm  RBBB and LAFB  No change from prior tracing  Electronically Signed By: Mira Nails ([facility]) 09-Aug-2021 10:09:37  Date and Time of Study: 2021-08-09 06:27:48              Results for orders placed during the hospital encounter of 08/06/21    Adult Transthoracic Echo Complete W/ Cont if Necessary Per Protocol    Interpretation Summary  · Left ventricular wall thickness is consistent with mild to moderate septal asymmetric hypertrophy.  · Calculated left ventricular EF = 63.8% Estimated left ventricular EF was in agreement with the calculated left ventricular EF. Left ventricular systolic function is normal.  · Mildly reduced right ventricular systolic function noted.      CT Head Without Contrast    Result Date: 8/10/2021  Senescent changes without acute abnormality. Signer Name: Henry Mendiola MD  Signed: 8/10/2021 11:23 AM  Workstation Name: [id number]  [facility]    CT Chest Without Contrast Diagnostic    Result Date: 8/7/2021  Patchy left lower lobe infiltrates Mild bibasilar atelectasis Stable 6 mm nodule right upper lobe  unchanged in 2017 Signer Name: Henry Mendiola MD  Signed: 8/7/2021 2:59 PM  Workstation Name: [email or web address]  [facility] Video Swallow With Speech Single Contrast    Result Date: 8/9/2021  1. No penetration or aspiration. Limited study.  This report was finalized on 8/9/2021 11:56 AM by Dr. Navarro Smalls MD.      XR Chest 1 View    Result Date: 8/6/2021  1.  Masslike opacity or consolidation of the medial left lung base behind the heart, unchanged since 06/30/2021. Consider chest CT, preferably with IV contrast, for further characterization. 2.  Increasing patchy infiltrates in both lung bases.  This report was finalized on 8/6/2021 7:28 AM by Dr. Jacques Bates MD.      XR Chest PA & Lateral    Result Date: 8/4/2021  1.  New or increasing reticulonodular infiltrates in both lower lobes. 2.  Nonresolving airspace consolidation in the medial left lower lobe. 3.  Consider chest CT imaging for more detailed assessment. Diagnostic considerations include atypical pulmonary infection or recurrent aspiration pneumonia. 4.  Stat final copy of this report was sent to the ordering physician's office following this dictation with telephone notification and documentation.  This report was finalized on 8/4/2021 4:41 PM by Dr. Jacques Bates MD.        I personally reviewed patient's x-ray films and my findings are: 0    I personally reviewed patient's EKG strip and my findings are: 0    Medication Review:   I have reviewed the patient's current medication list 08/11/21     Scheduled Meds  amiodarone, 200 mg, Oral, Q24H  apixaban, 5 mg, Oral, Q12H  atorvastatin, 10 mg, Oral, Daily  desvenlafaxine, 50 mg, Oral, Daily  gabapentin, 100 mg, Oral, QAM  gabapentin, 200 mg, Oral, Nightly  guaiFENesin, 1,200 mg, Oral, BID  insulin aspart, 0-7 Units, Subcutaneous, TID AC  insulin detemir, 40 Units, Subcutaneous, Nightly  levothyroxine, 88 mcg, Oral, Daily  midodrine, 2.5 mg, Oral, BID AC  nystatin, , Topical,  Q12H  O2, 2 L/min, Inhalation, Nightly  pantoprazole, 40 mg, Oral, QAM  sennosides-docusate, 2 tablet, Oral, Daily  sodium chloride, 10 mL, Intravenous, Q12H        Meds Infusions       DVT prophylaxis  Mechanical Order History:      Ordered        08/06/21 0834  Place Sequential Compression Device  Once         08/06/21 0834  Maintain Sequential Compression Device  Continuous                 Pharmalogical Order History:      Ordered     Dose Route Frequency Stop    08/11/21 1340  apixaban (ELIQUIS) tablet 5 mg      5 mg PO Every 12 Hours Scheduled --    08/07/21 1009  apixaban (ELIQUIS) tablet 2.5 mg  Status:  Discontinued      2.5 mg PO Every 12 Hours Scheduled 08/11/21 1340    08/07/21 0800  enoxaparin (LOVENOX) syringe 90 mg  Status:  Discontinued      1 mg/kg SC Daily 08/07/21 1003    08/06/21 0940  enoxaparin (LOVENOX) syringe 30 mg  Status:  Discontinued      30 mg SC Every 24 Hours 08/07/21 0759    08/06/21 0933  Pharmacy to Dose enoxaparin (LOVENOX)  Status:  Discontinued      -- XX Continuous PRN 08/07/21 1422    08/06/21 0834  Pharmacy to Dose enoxaparin (LOVENOX)  Status:  Discontinued     Question:  Indication of use  Answer:  Prophylaxis    -- XX Continuous PRN 08/07/21 0758                Meds PRN  •  acetaminophen **OR** acetaminophen **OR** acetaminophen  •  albuterol  •  dextrose  •  dextrose  •  glucagon (human recombinant)  •  melatonin  •  nitroglycerin  •  ondansetron **OR** ondansetron  •  ondansetron  •  sodium chloride  •  sodium chloride      Kleber Crowley MD  08/11/21  15:16 EDT

## 2021-08-11 NOTE — CASE MANAGEMENT/SOCIAL WORK
Continued Stay Note  VICKY Sharma     Patient Name: Joya Singh  MRN: [id number]  Today's Date: 8/11/2021    Admit Date: 8/6/2021    Discharge Plan     Row Name 08/11/21 1526       Plan    Plan Comments  Received call from Mayra, daughter-in-law and she states that Dr Headley the oncologist wants the patient to continue to receive her weekly procrit shots.  Called joseph with [facility] and let her know of his request.  They do not provide transport and the family will need to take the patient to the office for those injections.   Will continue to follow    Row Name 08/11/21 1357       Plan    Plan Comments  Patient has been accepted at [facility]. Joseph will be a precert.  Will continue to follow        Discharge Codes    No documentation.             Ivanna Villalpando RN

## 2021-08-11 NOTE — DISCHARGE PLACEMENT REQUEST
"Marycruz Salazar (81 y.o. Female)     Date of Birth Social Security Number Address [address] Phone MRN    1940  [address] [facility] [address] [phone] [id number]    Congregational Marital Status          Restorationism        Admission Date Admission Type Admitting Provider Attending Provider Department, Room/Bed    8/6/21 Emergency Serafin Alvarado DO [place], Michael R, DO [facility] MED SURG, 1406/1    Discharge Date Discharge Disposition Discharge Destination                       Attending Provider: Serafin Alvarado DO    Allergies: Baclofen, Codeine, Lisinopril, Morphine, Penicillins    Isolation: None   Infection: None   Code Status: CPR    Ht: 154.9 cm (61\")   Wt: 85.1 kg (187 lb 11.2 oz)    Admission Cmt: None   Principal Problem: None                Active Insurance as of 8/6/2021     Primary Coverage     Payor Plan Insurance Group Employer/Plan Group    HUMANA MEDICARE REPLACEMENT HUMANA MEDICARE REPLACEMENT [id number]     Payor Plan Address Payor Plan Phone Number Payor Plan Fax Number Effective Dates    [address] [address] [phone]  1/1/2015 - None Entered    [facility] [address]       Subscriber Name Subscriber Birth Date Member ID       MARYCRUZ SALAZAR 1940 [id number]           Secondary Coverage     Payor Plan Insurance Group Employer/Plan Group    MISC HOME HEALTH MISC HOME HEALTH NGN     Coverage Address Coverage Phone Number Coverage Fax Number Effective Dates    [address] [facility] [phone]  3/29/2021 - None Entered    [facility] [address]       Subscriber Name Subscriber Birth Date Member ID       MARYCRUZ SALAZAR 1940 [id number]                 Emergency Contacts      (Rel.) Home Phone Work Phone Mobile Phone    michael salazar (Relative) -- -- [phone]    Isaac Salazar (Son) [phone] -- --    Junior Salazar (Son) -- -- [phone]              "

## 2021-08-11 NOTE — TELEPHONE ENCOUNTER
Caller: FLORENCIA SALAZAR    Relationship: D-I-L    Best call back number: [phone]    What is the best time to reach you: ANY    Who are you requesting to speak with: DR. BOWLES'S NURSE    Do you know the name of the person who called: FLORENCIA    What was the call regarding: PATIENT IN HOPS. & FLORENCIA WANTED YOU ALL TO KNOW THAT PATIENTS HEMO. WAS 9.9 TODAY.  SHE WAS NOT SURE IF YOU WOULD WANT HOSP. TO GIVE HER HER SHOT TODAY.    Do you require a callback:  [facility], DOES NOT HAVE A  VERBAL FOR CBC.

## 2021-08-11 NOTE — NURSING NOTE
alis Nunez's daughter in law called and expressed concerns about being able to care for Ms Samantha at home. Ivanna/ADOLPH notified and told concerns expressed by family.

## 2021-08-11 NOTE — CASE MANAGEMENT/SOCIAL WORK
Continued Stay Note  VICKY Sharma     Patient Name: Joya Singh  MRN: [id number]  Today's Date: 8/11/2021    Admit Date: 8/6/2021    Discharge Plan     Row Name 08/11/21 1134       Plan    Plan Comments  Spoke with daughter-in-law Mayra and the family has requested a referral to [facility] for short term rehab and possible long term care.  Called nikki at [facility] and sent info for referral.  Will continue to follow        Discharge Codes    No documentation.             Ivanna Villalpando RN

## 2021-08-12 PROBLEM — Z79.899 POLYPHARMACY: Chronic | Status: ACTIVE | Noted: 2021-01-01

## 2021-08-12 NOTE — PLAN OF CARE
Goal Outcome Evaluation:  Plan of Care Reviewed With: patient           Outcome Summary: Patient oriented and appropriate at bedtime. On home dose oxygen 2L NC overnight. Still no BM. Awaiting precert from [facility].

## 2021-08-12 NOTE — THERAPY TREATMENT NOTE
Acute Care - Physical Therapy Treatment Note  VICKY Sharma     Patient Name: Joya Singh  : 1940  MRN: [id number]  Today's Date: 2021   Onset of Illness/Injury or Date of Surgery: 21  Visit Dx:     ICD-10-CM ICD-9-CM   1. Atrial flutter, unspecified type (CMS/[facility])  I48.92 427.32   2. Pneumonia of left lower lobe due to infectious organism  J18.9 486   3. JOYCE (acute kidney injury) (CMS/[facility])  N17.9 584.9   4. Oropharyngeal dysphagia  R13.12 787.22     Patient Active Problem List   Diagnosis   • Arthritis   • Chronic back pain   • Chronic anemia   • Anxiety and depression   • Type 2 diabetes mellitus with neurologic complication (CMS/[facility])   • Brittle diabetes mellitus (CMS/[facility])   • Dizzy   • Mixed hyperlipidemia   • Essential hypertension   • Insomnia with sleep apnea   • Obstructive sleep apnea syndrome   • Peripheral neuropathy   • Diabetic gastroparesis (CMS/[facility])   • Primary localized osteoarthrosis of left shoulder region   • Rotator cuff tendonitis   • Acquired hypothyroidism   • Anxiety   • History of biliary T-tube placement   • CKD stage 3 due to type 2 diabetes mellitus (CMS/[facility])   • Complex tear of medial meniscus of right knee as current injury   • Insufficiency fracture of tibia   • Primary osteoarthritis of left knee   • Orthostatic hypotension   • Tendinitis of right rotator cuff   • AC joint arthropathy   • Subacromial impingement of right shoulder   • Right carpal tunnel syndrome   • Anemia requiring transfusions   • Monoclonal gammopathy of unknown significance (MGUS)   • Myelodysplastic syndrome with 5q deletion (CMS/[facility])   • History of deep venous thrombosis (DVT) of distal vein of left lower extremity   • Moderate episode of recurrent major depressive disorder (CMS/[facility])   • Chronic diastolic CHF (congestive heart failure) (CMS/[facility])   • Knee pain   • Primary osteoarthritis of right knee   • Iron overload, transfusional   • Uncontrolled type 2 diabetes mellitus with hyperglycemia  (CMS/[facility])   • Localized edema   • Type 2 diabetes mellitus with diabetic neuropathy (CMS/[facility])   • Vitamin D deficiency   • Subacromial bursitis of left shoulder joint   • Gastroesophageal reflux disease with esophagitis without hemorrhage   • Esophageal dysphagia   • Anemia in neoplastic disease   • Leukocytes in urine   • Atrial flutter (CMS/[facility])   • Moderate malnutrition (CMS/[facility])     Past Medical History:   Diagnosis Date   • Allergic rhinitis    • Anemia    • Anxiety    • Appetite absent    • Arthritis    • Asthma    • Back pain    • Bell's palsy    • Black tarry stools    • Blood in stool    • Chronic fatigue    • CKD (chronic kidney disease) stage 3, GFR 30-59 ml/min (CMS/[facility])    • Community acquired pneumonia of left lung 10/25/2018   • Cough    • Depression    • Diabetes mellitus (CMS/[facility])     LAST A1C 6   • Diabetic gastroparesis (CMS/[facility]) 2/19/2016   • Difficulty walking    • Excessive urination at night    • Frequent urination    • GERD (gastroesophageal reflux disease)    • GI bleed    • Gout    • H/O blood clots     LEFT LEG 7 OR 8 YEARS AGO   • Heat intolerance    • History of fall 10/2018   • History of prior pregnancies     x8, miscarriage 5   • History of transfusion 11/2018    due to anemia   • Hyperlipidemia    • Hypertension    • Hypothyroidism    • Normal coronary arteries     by cath 2013   • Orthostatic hypotension    • AARON (obstructive sleep apnea)     DOESNT WEAR REGULARLY   • Pneumonia    • PONV (postoperative nausea and vomiting)    • Skin cancer    • Stroke (CMS/[facility])     Several mini-strokes   • TIA (transient ischemic attack)     LAST TIA JULY 2017   • Urination pain    • UTI (urinary tract infection)     Dec 2018 and Jan 2019     Past Surgical History:   Procedure Laterality Date   • BACK SURGERY      HARDWARE   • CHOLECYSTECTOMY      OPEN   • COLONOSCOPY  2011    due for repeat in 2021   • COLONOSCOPY N/A 10/28/2018    Procedure: COLONOSCOPY;  Surgeon: Emmanuel Rogers MD;   Location:  [facility];  Service: Gastroenterology   • ENDOSCOPY N/A 10/26/2018    Procedure: ESOPHAGOGASTRODUODENOSCOPY;  Surgeon: Emmanuel Rogers MD;  Location:  [facility];  Service: Gastroenterology   • ENDOSCOPY N/A 5/13/2021    Procedure: ESOPHAGOGASTRODUODENOSCOPY, biopsy, dilatation;  Surgeon: Emmanuel Rogers MD;  Location:  [facility];  Service: Gastroenterology;  Laterality: N/A;  Esophagitis; Dilation- no stricture; Biopsy- esophagus   • HYSTERECTOMY      PARTIAL    • KNEE SURGERY     • NECK SURGERY     • SPINE SURGERY     • TUMOR REMOVAL Left     Leg   • UPPER GASTROINTESTINAL ENDOSCOPY  2014    gastritis.  done by dr. hastings        PT Assessment (last 12 hours)      PT Evaluation and Treatment     Row Name 08/12/21 0924          Physical Therapy Time and Intention    Subjective Information  complains of;nausea/vomiting;weakness  -LN     Document Type  therapy note (daily note)  -LN     Mode of Treatment  physical therapy  -LN     Patient Effort  adequate  -LN     Symptoms Noted During/After Treatment  nausea  -LN     Comment  Patient c/o nausea which worsened with activity; also c/o dizziness with activity.  -LN     Row Name 08/12/21 0924          General Information    Patient Observations  alert;cooperative;agree to therapy  -LN     Patient/Family/Caregiver Comments/Observations  Patient reclined in chair. Patient agreeable to PT. Requesting to go to bathroom.  -LN     Risks Reviewed  patient:;LOB;nausea/vomiting;dizziness;increased discomfort  -LN     Benefits Reviewed  patient:;improve function;increase independence;increase strength  -LN     Row Name 08/12/21 0924          Pain Scale: Word Pre/Post-Treatment    Pre/Posttreatment Pain Comment  No c/o any pain  -LN     Row Name 08/12/21 0924          Bed Mobility    Comment (Bed Mobility)  deferred- patient up in chair  -LN     Row Name 08/12/21 0924          Transfers    Transfers  sit-stand transfer;stand-sit transfer;toilet  transfer  -LN     Comment (Transfers)  verbal cues for hand placement  -LN     Sit-Stand [place] (Transfers)  contact guard;verbal cues;nonverbal cues (demo/gesture)  -LN     Stand-Sit [place] (Transfers)  contact guard;verbal cues;nonverbal cues (demo/gesture)  -LN     [place] Level (Toilet Transfer)  contact guard;verbal cues;nonverbal cues (demo/gesture)  -LN     Assistive Device (Toilet Transfer)  raised toilet seat  -LN     Row Name 08/12/21 0924          Sit-Stand Transfer    Assistive Device (Sit-Stand Transfers)  walker, front-wheeled  -LN     Row Name 08/12/21 0924          Stand-Sit Transfer    Assistive Device (Stand-Sit Transfers)  walker, front-wheeled  -LN     Row Name 08/12/21 0924          Toilet Transfer    Type (Toilet Transfer)  sit-stand;stand-sit  -LN     Row Name 08/12/21 0924          Gait/Stairs (Locomotion)    [place] Level (Gait)  contact guard;verbal cues;nonverbal cues (demo/gesture)  -LN     Assistive Device (Gait)  walker, front-wheeled  -LN     Distance in Feet (Gait)  30 feet total; 15 feet to bathroom and then seated rest break on bed secondary to c/o dizziness and nausea and then ambulated back to recliner chair.  -LN     Deviations/Abnormal Patterns (Gait)  gait speed decreased;stride length decreased  -LN     Bilateral Gait Deviations  forward flexed posture  -LN     Comment (Gait/Stairs)  Patient needed cueing to keep FWW with her the whole time when she is turning to back up to chair/commode; tends to want to leave it and reach for chair/commode seat. No LOB noted. Patient c/o dizziness and nausea after using bathroom and requested to return to recliner chair; refused further ambulation secondary to c/o dizziness/nausea even with encouragment for further distance.   -LN     Row Name 08/12/21 0924          Safety Issues, Functional Mobility    Safety Issues Affecting Function (Mobility)  insight into deficits/self-awareness;judgment;positioning of assistive  device;problem-solving;awareness of need for assistance;at risk behavior observed  -LN     Comment, Safety Issues/Impairments (Mobility)  Still requiring cueing for safety throughout session.  -LN     Row Name 08/12/21 0924          Balance    Comment, Balance  static standing for 2 minutes with CGA and FWW.  -LN     Row Name 08/12/21 0924          Hip (Therapeutic Exercise)    Hip (Therapeutic Exercise)  AROM (active range of motion)  -LN     Hip AROM (Therapeutic Exercise)  bilateral;aBduction;aDduction;5 repetitions  -LN     Row Name 08/12/21 0924          Knee (Therapeutic Exercise)    Knee (Therapeutic Exercise)  AROM (active range of motion);isometric exercises  -LN     Knee AROM (Therapeutic Exercise)  bilateral;LAQ (long arc quad);5 repetitions  -LN     Knee Isometrics (Therapeutic Exercise)  bilateral;quad sets;10 repetitions  -LN     Row Name 08/12/21 0924          Ankle (Therapeutic Exercise)    Ankle (Therapeutic Exercise)  AROM (active range of motion)  -LN     Ankle AROM (Therapeutic Exercise)  bilateral;dorsiflexion;plantarflexion;10 repetitions AP  -LN     Row Name 08/12/21 0924          Plan of Care Review    Plan of Care Reviewed With  patient  -LN     Outcome Summary  PT: Patient agreed to work with PT today. Patient needed CGA for sit to stand and stand to sit transfers with FWW. She ambulated a total of 30 feet in room with FWW and CGA; she ambulated from recliner chair to bathroom and then c/o increased dizziness/nausea and had to sit on EOB for a few minutes and then ambulated back to recliner chair. She refused any further gait secondary to c/o nausea; even with encouragement for increased distance. Patient did perform 5-10 reps of LE exercises. Will continue to follow while patient is in hospital. Plan is for discharge to [facility]; possible today.   -     Row Name 08/12/21 0924          Positioning and Restraints    Pre-Treatment Position  sitting in chair/recliner  -LN     Post Treatment  Position  chair  -LN     In Chair  notified nsg;reclined;call light within reach;encouraged to call for assist;exit alarm on;legs elevated  -LN     Row Name 08/12/21 0924          Progress Summary (PT)    Progress Toward Functional Goals (PT)  progress toward functional goals is gradual  -LN     Barriers to Overall Progress (PT)  cognition; c/o dizziness and nausea  -LN     Row Name 08/12/21 0924          Therapy Plan Review/Discharge Plan (PT)    Therapy Plan Review (PT)  care plan/treatment goals reviewed;risks/benefits reviewed;current/potential barriers reviewed;participants included;patient;participants voiced agreement with care plan  -LN       User Key  (r) = Recorded By, (t) = Taken By, (c) = Cosigned By    Initials Name Provider Type    LN Lou Luciano, PT Physical Therapist        Physical Therapy Education                 Title: PT OT SLP Therapies (Done)     Topic: Physical Therapy (Done)     Point: Mobility training (Done)     Learning Progress Summary           Patient Acceptance, E,TB, VU,NR by  at 8/11/2021 1202    Acceptance, E,TB, VU,NR by  at 8/10/2021 1452    Acceptance, E,TB, VU by JW at 8/9/2021 1129    Acceptance, E,TB, VU,NR by AS at 8/7/2021 1045                               User Key     Initials Effective Dates Name Provider Type Discipline    BP 06/16/21 -  Olegario Silva, PT Physical Therapist PT    JW 06/16/21 -  Steffany Noble, PT Physical Therapist PT    AS 06/16/21 -  Yasmani Wilder, PT Physical Therapist PT              PT Recommendation and Plan  Anticipated Discharge Disposition (PT): skilled nursing facility  Progress Summary (PT)  Progress Toward Functional Goals (PT): progress toward functional goals is gradual  Barriers to Overall Progress (PT): cognition; c/o dizziness and nausea  Plan of Care Reviewed With: patient  Outcome Summary: PT: Patient agreed to work with PT today. Patient needed CGA for sit to stand and stand to sit transfers with FWW. She  ambulated a total of 30 feet in room with FWW and CGA; she ambulated from recliner chair to bathroom and then c/o increased dizziness/nausea and had to sit on EOB for a few minutes and then ambulated back to recliner chair. She refused any further gait secondary to c/o nausea; even with encouragement for increased distance. Patient did perform 5-10 reps of LE exercises. Will continue to follow while patient is in hospital. Plan is for discharge to [facility]; possible today.      Outcome Measures     Row Name 08/12/21 0924 08/11/21 1058 08/11/21 0842       How much help from another person do you currently need...    Turning from your back to your side while in flat bed without using bedrails?  3  -LN  3  -BP  --    Moving from lying on back to sitting on the side of a flat bed without bedrails?  3  -LN  3  -BP  --    Moving to and from a bed to a chair (including a wheelchair)?  3  -LN  3  -BP  --    Standing up from a chair using your arms (e.g., wheelchair, bedside chair)?  3  -LN  3  -BP  --    Climbing 3-5 steps with a railing?  2  -LN  2  -BP  --    To walk in hospital room?  3  -LN  3  -BP  --    AM-PAC 6 Clicks Score (PT)  17  -LN  17  -BP  --       How much help from another is currently needed...    Putting on and taking off regular lower body clothing?  --  --  2  -EN    Bathing (including washing, rinsing, and drying)  --  --  2  -EN    Toileting (which includes using toilet bed pan or urinal)  --  --  2  -EN    Putting on and taking off regular upper body clothing  --  --  4  -EN    Taking care of personal grooming (such as brushing teeth)  --  --  4  -EN    Eating meals  --  --  4  -EN    AM-PAC 6 Clicks Score (OT)  --  --  18  -EN       Functional Assessment    Outcome Measure Options  AM-PAC 6 Clicks Basic Mobility (PT)  -LN  AM-PAC 6 Clicks Basic Mobility (PT)  -BP  --    Row Name 08/10/21 1421 08/10/21 1100          How much help from another person do you currently need...    Turning from your back to  your side while in flat bed without using bedrails?  3  -BP  --     Moving from lying on back to sitting on the side of a flat bed without bedrails?  3  -BP  --     Moving to and from a bed to a chair (including a wheelchair)?  3  -BP  --     Standing up from a chair using your arms (e.g., wheelchair, bedside chair)?  3  -BP  --     Climbing 3-5 steps with a railing?  2  -BP  --     To walk in hospital room?  3  -BP  --     AM-PAC 6 Clicks Score (PT)  17  -BP  --        How much help from another is currently needed...    Putting on and taking off regular lower body clothing?  --  2  -EN     Bathing (including washing, rinsing, and drying)  --  2  -EN     Toileting (which includes using toilet bed pan or urinal)  --  2  -EN     Putting on and taking off regular upper body clothing  --  4  -EN     Taking care of personal grooming (such as brushing teeth)  --  4  -EN     Eating meals  --  4  -EN     AM-PAC 6 Clicks Score (OT)  --  18  -EN        Functional Assessment    Outcome Measure Options  AM-PAC 6 Clicks Basic Mobility (PT)  -BP  --       User Key  (r) = Recorded By, (t) = Taken By, (c) = Cosigned By    Initials Name Provider Type    EN Mildred Montaño, OTR Occupational Therapist    Lou Elena, PT Physical Therapist    BP Olegario Silva, PT Physical Therapist           Time Calculation:   PT Charges     Row Name 08/12/21 1102             Time Calculation    Start Time  0924  -LN      Stop Time  0941  -LN      Time Calculation (min)  17 min  -LN        User Key  (r) = Recorded By, (t) = Taken By, (c) = Cosigned By    Initials Name Provider Type    Lou Elena, PT Physical Therapist        Therapy Charges for Today     Code Description Service Date Service Provider Modifiers Qty    [id number] HC GAIT TRAINING EA 15 MIN 8/12/2021 Lou Luciano, PT GP 1          PT G-Codes  Outcome Measure Options: AM-PAC 6 Clicks Basic Mobility (PT)  AM-PAC 6 Clicks Score (PT):  17  AM-PAC 6 Clicks Score (OT): 18    Lou Luciano, PT  8/12/2021

## 2021-08-12 NOTE — PLAN OF CARE
Goal Outcome Evaluation:  Plan of Care Reviewed With: patient           Outcome Summary: PT: Patient agreed to work with PT today. Patient needed CGA for sit to stand and stand to sit transfers with FWW. She ambulated a total of 30 feet in room with FWW and CGA; she ambulated from recliner chair to bathroom and then c/o increased dizziness/nausea and had to sit on EOB for a few minutes and then ambulated back to recliner chair. She refused any further gait secondary to c/o nausea; even with encouragement for increased distance. Patient did perform 5-10 reps of LE exercises. Will continue to follow while patient is in hospital. Plan is for discharge to [facility]; possible today.

## 2021-08-12 NOTE — DISCHARGE SUMMARY
Date of Admission: 8/6/2021  1406/1    Date of Discharge:  8/12/2021    Length of stay:  LOS: 6 days     Patient Care Team:  Chari Mercado MD as PCP - General  Chari Mercado MD as PCP - Family Medicine  Christos Rob MD as Surgeon (General Surgery)  German Wylie MD as Surgeon (Orthopedic Surgery)  Yasmani Baugh MD as Consulting Physician (Nephrology)  Yasmani Baugh MD as Referring Physician (Nephrology)  Elieser Headley MD as Consulting Physician (Hematology and Oncology)         Presenting Problem/History of Present Illness   Present on Admission:  • Atrial flutter (CMS/HCC)  • Moderate malnutrition (CMS/HCC)  • 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 female.    PCP  Chari Mercado MD (General)    The patient is a 81-year-old female, known to hospitalist service from prior admissions, but presented to the emergency department secondary to persistent shortness of air.  She has seen her primary care provider Dr. Mercado multiple times for the same concerns/recurrent or refractory pneumonias, and was recently placed on Omnicef and a azithromycin on 8/5/2021 and prior to that cefdinir and then moxifloxacin and of June without resolution.  She has been using her oxygen most days due to the shortness of air.  She further reports recurrent syncopal sensation, palpitations, longstanding dizziness that has become worse, nonproductive cough, intermittent chest pain lasting approximately 15 minutes  "that is not relieved with rest, nasal congestion, sinus pressure, fatigue/malaise, dysuria and burning with urination, decreased appetite, generalized weakness, choking with eating that has become worse, early satiety and intermittent nausea without vomiting. She states \"I just want some quality of life\", as she has felt poorly for some time.      Diagnostics in ER revealed atrial flutter requiring diltiazem drip, persistent left lower lobe masslike opacity, increasing patchy infiltrates bilateral lung bases.  Creatinine 2.68, hemoglobin 11.3, proBNP 3800.0, procalcitonin 0.71     She has a known history of recurrent aspiration pneumonia, transfusion dependent MDS (last transfusion 8/4/2021), CKD stage III/IV, chronic diastolic heart failure, history of DVT Eliquis, hypertension DM2, diabetic retinopathy, overactive bladder, hyperlipidemia, recurrent UTIs, esophageal stricture, depression, chronic pain, Bell's palsy, diabetic gastroparesis, history of TIA, AARON, orthostatic hypotension, hypothyroidism.     She denies f/c/headache/v/d/chest pain/abdominal pain/sick exposures/change in bowel or bladder habits/bloody emesis or bloody stools/change in medications or any other new concerns        Patient is being discharged in a satisfactory condition.  I am concerned about her polypharmacy.  Defer to the PCP to address that.  She might need outpatient follow-up with cardiology.  Again defer to the PCP.    ROS  Review of Systems   Constitutional: Negative.   HENT: Negative.    Eyes: Negative.    Cardiovascular: Negative.    Respiratory: Negative.    Endocrine: Negative.    Hematologic/Lymphatic: Negative.    Skin: Negative.    Musculoskeletal: Negative.    Gastrointestinal: Negative.    Genitourinary: Negative.    Neurological: Positive for dizziness.   Psychiatric/Behavioral: Negative.    Allergic/Immunologic: Negative.    All other systems reviewed and are negative.   Noted        Family History   Problem Relation Age " of Onset   • Lupus Mother    • Heart failure Mother 59   • Heart disease Other    • Hypertension Other    • Heart attack Father    • Breast cancer Neg Hx         Past Medical History:   Diagnosis Date   • Allergic rhinitis    • Anemia    • Anxiety    • Appetite absent    • Arthritis    • Asthma    • Back pain    • Bell's palsy    • Black tarry stools    • Blood in stool    • Chronic fatigue    • CKD (chronic kidney disease) stage 3, GFR 30-59 ml/min (CMS/[facility])    • Community acquired pneumonia of left lung 10/25/2018   • Cough    • Depression    • Diabetes mellitus (CMS/[facility])     LAST A1C 6   • Diabetic gastroparesis (CMS/[facility]) 2/19/2016   • Difficulty walking    • Excessive urination at night    • Frequent urination    • GERD (gastroesophageal reflux disease)    • GI bleed    • Gout    • H/O blood clots     LEFT LEG 7 OR 8 YEARS AGO   • Heat intolerance    • History of fall 10/2018   • History of prior pregnancies     x8, miscarriage 5   • History of transfusion 11/2018    due to anemia   • Hyperlipidemia    • Hypertension    • Hypothyroidism    • Normal coronary arteries     by cath 2013   • Orthostatic hypotension    • AARON (obstructive sleep apnea)     DOESNT WEAR REGULARLY   • Pneumonia    • PONV (postoperative nausea and vomiting)    • Skin cancer    • Stroke (CMS/[facility])     Several mini-strokes   • TIA (transient ischemic attack)     LAST TIA JULY 2017   • Urination pain    • UTI (urinary tract infection)     Dec 2018 and Jan 2019       Past Surgical History:   Procedure Laterality Date   • BACK SURGERY      HARDWARE   • CHOLECYSTECTOMY      OPEN   • COLONOSCOPY  2011    due for repeat in 2021   • COLONOSCOPY N/A 10/28/2018    Procedure: COLONOSCOPY;  Surgeon: Emmanuel Rogers MD;  Location: [facility] OR;  Service: Gastroenterology   • ENDOSCOPY N/A 10/26/2018    Procedure: ESOPHAGOGASTRODUODENOSCOPY;  Surgeon: Emmanuel Rogers MD;  Location: [facility] OR;  Service: Gastroenterology   • ENDOSCOPY  N/A 5/13/2021    Procedure: ESOPHAGOGASTRODUODENOSCOPY, biopsy, dilatation;  Surgeon: Emmanuel Rogers MD;  Location: [facility];  Service: Gastroenterology;  Laterality: N/A;  Esophagitis; Dilation- no stricture; Biopsy- esophagus   • HYSTERECTOMY      PARTIAL    • KNEE SURGERY     • NECK SURGERY     • SPINE SURGERY     • TUMOR REMOVAL Left     Leg   • UPPER GASTROINTESTINAL ENDOSCOPY  2014    gastritis.  done by dr. hastings       Social History     Socioeconomic History   • Marital status:      Spouse name: Not on file   • Number of children: 3   • Years of education: High School   • Highest education level: Not on file   Tobacco Use   • Smoking status: Never Smoker   • Smokeless tobacco: Never Used   • Tobacco comment: CAFFEINE USE: NONE   Vaping Use   • Vaping Use: Never used   Substance and Sexual Activity   • Alcohol use: No   • Drug use: No   • Sexual activity: Defer     Comment: EXERCISE - RARELY       Vital Signs  Temp:  [97.4 °F (36.3 °C)-97.8 °F (36.6 °C)] 97.7 °F (36.5 °C)  Heart Rate:  [65-69] 65  Resp:  [17-18] 17  BP: (130-148)/(63-68) 130/63  Weight change:     Physical Exam:  Physical Exam  Vitals and nursing note reviewed.   Constitutional:       General: She is not in acute distress.     Appearance: Normal appearance. She is well-developed. She is not ill-appearing, toxic-appearing or diaphoretic.   HENT:      Head: Normocephalic and atraumatic.      Right Ear: Ear canal and external ear normal.      Left Ear: Ear canal and external ear normal.      Nose: Nose normal. No congestion or rhinorrhea.      Mouth/Throat:      Mouth: Mucous membranes are moist.      Pharynx: No oropharyngeal exudate.   Eyes:      General: No scleral icterus.        Right eye: No discharge.         Left eye: No discharge.      Extraocular Movements: Extraocular movements intact.      Conjunctiva/sclera: Conjunctivae normal.      Pupils: Pupils are equal, round, and reactive to light.   Neck:      Thyroid:  No thyromegaly.      Vascular: No carotid bruit or JVD.      Trachea: No tracheal deviation.   Cardiovascular:      Rate and Rhythm: Normal rate and regular rhythm.      Pulses: Normal pulses.      Heart sounds: Normal heart sounds. No murmur heard.   No friction rub. No gallop.    Pulmonary:      Effort: Pulmonary effort is normal. No respiratory distress.      Breath sounds: Normal breath sounds. No stridor. No wheezing, rhonchi or rales.   Chest:      Chest wall: No tenderness.   Abdominal:      General: Bowel sounds are normal. There is no distension.      Palpations: Abdomen is soft. There is no mass.      Tenderness: There is no abdominal tenderness. There is no guarding or rebound.      Hernia: No hernia is present.   Musculoskeletal:         General: No swelling, tenderness, deformity or signs of injury. Normal range of motion.      Cervical back: Normal range of motion and neck supple. No rigidity. No muscular tenderness.      Right lower leg: No edema.      Left lower leg: No edema.   Lymphadenopathy:      Cervical: No cervical adenopathy.   Skin:     General: Skin is warm and dry.      Coloration: Skin is not jaundiced or pale.      Findings: No bruising, erythema or rash.   Neurological:      General: No focal deficit present.      Mental Status: She is alert. Mental status is at baseline.      Cranial Nerves: No cranial nerve deficit.      Sensory: No sensory deficit.      Motor: No weakness or abnormal muscle tone.      Coordination: Coordination normal.   Psychiatric:         Mood and Affect: Mood normal.         Behavior: Behavior normal.         Thought Content: Thought content normal.         Judgment: Judgment normal.   Reviewed, no change in above data from the prior day.          Discharge Diagnosis:     Active Hospital Problems    Diagnosis  POA   • **Chronic diastolic CHF (congestive heart failure) (CMS/[facility]) [I50.32]  Yes     Priority: High   • CKD stage 3 due to type 2 diabetes mellitus  (CMS/[facility]) [E11.22, N18.30]  Yes     Priority: Medium   • Polypharmacy [Z79.899]  Not Applicable   • Atrial flutter (CMS/[facility]) [I48.92]  Yes   • Moderate malnutrition (CMS/[facility]) [E44.0]  Yes   • Myelodysplastic syndrome with 5q deletion (CMS/[facility]) [D46.C]  Yes   • Monoclonal gammopathy of unknown significance (MGUS) [D47.2]  Yes   • Diabetic gastroparesis (CMS/[facility]) [E11.43, K31.84]  Yes   • Type 2 diabetes mellitus with neurologic complication (CMS/[facility]) [E11.49]  Yes   • Mixed hyperlipidemia [E78.2]  Yes   • Essential hypertension [I10]  Yes      Resolved Hospital Problems   No resolved problems to display.       Estimated Creatinine Clearance: 27.5 mL/min (A) (by C-G formula based on SCr of 1.65 mg/dL (H)).    Discharge Disposition    Continued Care and Services - Admitted Since 8/6/2021     Destination Coordination complete.    Service Provider Request Status Selected Services Address Phone Fax Patient Preferred    [facility]   Selected Skilled Nursing [address] [place] [id number] [phone] --                    PT Recommendation and Plan          Skilled Nursing Facility (DC - External)           Discharge Medications      New Medications      Instructions Start Date   amiodarone 200 MG tablet  Commonly known as: PACERONE   200 mg, Oral, Every 24 Hours Scheduled   Start Date: August 13, 2021        Changes to Medications      Instructions Start Date   cyclobenzaprine 10 MG tablet  Commonly known as: FLEXERIL  What changed:   · how much to take  · when to take this   5 mg, Oral, 2 Times Daily PRN      Ventolin  (90 Base) MCG/ACT inhaler  Generic drug: albuterol sulfate HFA  What changed: Another medication with the same name was removed. Continue taking this medication, and follow the directions you see here.   INHALE TWO PUFFS BY MOUTH EVERY 4 HOURS AS NEEDED FOR WHEEZING         Continue These Medications      Instructions Start Date   Accu-Chek FastClix Lancets misc   TEST 3-4  "TIMES DAILY AS DIRECTED      Accu-Chek Susu SmartView w/Device kit   TEST blood sugar three times daily or as directed      Accu-Chek SmartView test strip  Generic drug: glucose blood   TEST BLOOD SUGAR THREE TIMES DAILY OR AS DIRECTED      acetaminophen 325 MG tablet  Commonly known as: TYLENOL   650 mg, Oral, Every 6 Hours PRN, OTC product      apixaban 5 MG tablet tablet  Commonly known as: Eliquis   5 mg, Oral, 2 Times Daily      atorvastatin 10 MG tablet  Commonly known as: LIPITOR   TAKE ONE TABLET BY MOUTH AT BEDTIME      Claritin 10 MG tablet  Generic drug: loratadine   10 mg, Oral, Daily      desvenlafaxine 50 MG 24 hr tablet  Commonly known as: PRISTIQ   TAKE ONE TABLET BY MOUTH EVERY DAY      Diclofenac Sodium 1 % gel gel  Commonly known as: Voltaren   4 g, Topical, 4 Times Daily PRN      dicyclomine 10 MG capsule  Commonly known as: Bentyl   Take 1 cap po q 8 hours prn spasm      famotidine 40 MG tablet  Commonly known as: PEPCID   40 mg, Oral, 2 Times Daily, With lunch and at bedtime      furosemide 20 MG tablet  Commonly known as: LASIX   TAKE ONE TABLET BY MOUTH EVERY DAY Take ONE additional pill daily as needed for swelling      HYDROcodone-acetaminophen 5-325 MG per tablet  Commonly known as: NORCO   TAKE ONE TABLET BY MOUTH every 12 hours AS NEEDED FOR SEVERE PAIN      insulin aspart 100 UNIT/ML injection  Commonly known as: novoLOG   5 Units, Subcutaneous, 3 Times Daily With Meals      levothyroxine 88 MCG tablet  Commonly known as: SYNTHROID, LEVOTHROID   TAKE ONE TABLET BY MOUTH EVERY DAY      midodrine 2.5 MG tablet  Commonly known as: PROAMATINE   TAKE ONE TABLET BY MOUTH THREE TIMES DAILY      Mirabegron ER 25 MG tablet sustained-release 24 hour 24 hr tablet  Commonly known as: Myrbetriq   25 mg, Oral, Daily      Nebulizer device   1 each, Does not apply, Take As Directed      Needle (Disp) 30G X 1/2\" misc  Commonly known as: BD Disp Needles   To be used 3 times daily with Novolog Flexpen.    "   nitrofurantoin (macrocrystal-monohydrate) 100 MG capsule  Commonly known as: MACROBID   100 mg, Oral, 2 Times Daily      nitrofurantoin (macrocrystal-monohydrate) 100 MG capsule  Commonly known as: MACROBID   100 mg, Oral, Daily      nystatin 510938 UNIT/GM cream  Commonly known as: MYCOSTATIN   Topical, 2 times daily      O2  Commonly known as: OXYGEN   2 L/min, Inhalation, Nightly, Uses 2L  overnight only      omeprazole 40 MG capsule  Commonly known as: priLOSEC   40 mg, Oral, 2 times daily      ondansetron 8 MG tablet  Commonly known as: ZOFRAN   8 mg, Oral, 3 Times Daily PRN      potassium chloride 10 MEQ CR tablet   TAKE ONE TABLET BY MOUTH EVERY DAY      sennosides-docusate 8.6-50 MG per tablet  Commonly known as: PERICOLACE   2 tablets, Oral, Daily      Tresiba FlexTouch 200 UNIT/ML solution pen-injector pen injection  Generic drug: Insulin Degludec   INJECT 54 UNITS subcutaneously AT BEDTIME      UltiCare Mini Pen Needles 31G X 6 MM misc  Generic drug: Insulin Pen Needle   USE TO inject insulins FOUR TIMES DAILY AS DIRECTED         Stop These Medications    azithromycin 250 MG tablet  Commonly known as: Zithromax     cefdinir 300 MG capsule  Commonly known as: OMNICEF     phenazopyridine 200 MG tablet  Commonly known as: PYRIDIUM        ASK your doctor about these medications      Instructions Start Date   gabapentin 100 MG capsule  Commonly known as: NEURONTIN  Ask about: Which instructions should I use?   Take 1 capsule by mouth every morning and 2 capsules by mouth every night.           Discharge medications personally reviewed by me and med rec done by me personally.  08/12/21, 12:48 PM EDT        Consults:   Consults     Date and Time Order Name Status Description    8/6/2021  8:34 AM Inpatient Cardiology Consult      8/6/2021  8:34 AM Inpatient Pulmonology Consult Completed           Procedures Performed:    * No surgery found *        Pertinent Test Results:   Results from last 7 days   Lab Units  08/11/21 0429 08/09/21  0503 08/07/21  0509   WBC 10*3/mm3 3.56 3.83 5.72   HEMOGLOBIN g/dL 9.9* 9.6* 9.3*   HEMATOCRIT % 32.3* 30.8* 30.7*   MCV fL 96.4 97.2* 97.5*   MCH pg 29.6 30.3 29.5   PLATELETS 10*3/mm3 263 237 235     Results from last 7 days   Lab Units 08/11/21  0429 08/10/21  0921 08/09/21  0503 08/07/21  0509 08/06/21  0603   SODIUM mmol/L 143 142 139   < > 136   POTASSIUM mmol/L 4.7 4.9 5.1   < > 4.9   CHLORIDE mmol/L 110* 109* 108*   < > 102   CO2 mmol/L 28.3 26.8 24.0   < > 26.2   BUN mg/dL 29* 32* 42*   < > 48*   CREATININE mg/dL 1.65* 1.74* 2.21*   < > 2.68*   CALCIUM mg/dL 9.1 9.2 9.0   < > 9.1   MAGNESIUM mg/dL  --   --   --   --  2.3   BILIRUBIN mg/dL 0.5  --   --   --  0.7   ALK PHOS U/L 160*  --   --   --  169*   ALT (SGPT) U/L 15  --   --   --  16   AST (SGOT) U/L 14  --   --   --  18   GLUCOSE mg/dL 120* 202* 213*   < > 96    < > = values in this interval not displayed.     Lab Results   Component Value Date    CALCIUM 9.1 08/11/2021    PHOS 3.1 08/10/2021     No results found for: HGBA1C  Lab Results   Component Value Date    CHLPL 91 (L) 01/12/2021    TRIG 57 01/12/2021    HDL 42 01/12/2021    LDL 36 01/12/2021     Lab Results   Component Value Date    LIPASE 29 10/26/2018         Pathology  Lab Results   Lab Value Date/Time    FINALDX  05/13/2021 1502     1. Esophagus, Distal, Biopsy:  Benign squamous and gastric mucosa with   A. Intestinal metaplasia consistent with Gonzalez's esophagus, reactive changes and no dysplasia.   B. Chronic inflammation of the gastric mucosa.    Clint/kds      FINALDX  03/06/2019 1115     1. Core Biopsy, Right Iliac Crest:   A. Normal overall cellularity approximating 35%.   B. Erythroid hyperplasia with megaloblastoid change.   C. The myeloid series is well represented with normal maturation.   D. The myeloid to erythroid ratio approximates 2:1.   E. Megakaryocytes are increased in number with frequent micromegakaryocytes.   F. Scattered lymphoid  aggregates.   G. Morphologically normal plasma cells encompassing up to 8% of the overall cellularity.   H. Negative for excess blasts.   I.  No cells extrinsic to the marrow are identified.   J. Special stains are negative for increased iron stores.   K. Reticulin and trichrome stains are unremarkable.   L. Special stains to follow.    2. Bone Marrow Aspiration Smears and Clot, Right Iliac Crest:   A. Normal overall cellularity approximating 35%.   B. Erythroid hyperplasia with megaloblastoid change.   C. The myeloid series is well represented with normal maturation.   D. The myeloid to erythroid ratio approximates 2:1.   E. Megakaryocytes are increased in number with frequent micromegakaryocytes.   F. Scattered lymphoid aggregates.   G. Morphologically normal plasma cells encompassing up to 8% of the overall cellularity.   H. Negative for excess blasts.   I.  No cells extrinsic to the marrow are identified.   J. Special stains are negative for increased iron stores.   K. Reticulin and trichrome stains are unremarkable.   L. Special stains to follow.    Dma/kds      COMDX  03/06/2019 1115     The prevalent findings in this bone marrow biopsy are an erythroid hyperplasia with megaloblastic change and increased megakaryocytes with micromegakaryocytes and an increase in plasma cells.  No significant dysplastic change is identified.  No evidence of excess blasts is present.  The overall pattern is that of a macrocytic anemia with increased megakaryocytes and a significant plasmacytosis.  A low grade myelodysplastic syndrome cannot be excluded.  Flow cytometric and cytogenetic studies to follow.  Drug effect can likewise give similar changes. Serum protien electrophoresis is recommended.  This case was seen in consultation with Dr. Montelongo, who concurs.             dma/kds       Inflammatory Biomarkers        Invalid input(s): ESR, D-DIMER QUANTITATIVE,  PROCALCITONIN  COVID19   Date Value Ref Range Status   08/06/2021  Not Detected Not Detected - Ref. Range Final        Microbiology Results (last 10 days)     Procedure Component Value - Date/Time    MRSA Screen Culture (Outpatient) - Swab, Nares [[id number]]  (Normal) Collected: 08/06/21 1427    Lab Status: Final result Specimen: Swab from Nares Updated: 08/07/21 1926     MRSA Screen Cx No Methicillin Resistant Staphylococcus aureus isolated    Respiratory Panel, PCR (WITHOUT COVID) - Swab, Nasopharynx [[id number]]  (Normal) Collected: 08/06/21 1028    Lab Status: Final result Specimen: Swab from Nasopharynx Updated: 08/06/21 1705     ADENOVIRUS, PCR Not Detected     Coronavirus 229E Not Detected     Coronavirus HKU1 Not Detected     Coronavirus NL63 Not Detected     Coronavirus OC43 Not Detected     Human Metapneumovirus Not Detected     Human Rhinovirus/Enterovirus Not Detected     Influenza B PCR Not Detected     Parainfluenza Virus 1 Not Detected     Parainfluenza Virus 2 Not Detected     Parainfluenza Virus 3 Not Detected     Parainfluenza Virus 4 Not Detected     Bordetella pertussis pcr Not Detected     Chlamydophila pneumoniae PCR Not Detected     Mycoplasma pneumo by PCR Not Detected     Influenza A PCR Not Detected     RSV, PCR Not Detected     Bordetella parapertussis PCR Not Detected    Narrative:      The coronavirus on the RVP is NOT COVID-19 and is NOT indicative of infection with COVID-19.    In the setting of a positive respiratory panel with a viral infection PLUS a negative procalcitonin without other underlying concern for bacterial infection, consider observing off antibiotics or discontinuation of antibiotics and continue supportive care. If the respiratory panel is positive for atypical bacterial infection (Bordetella pertussis, Chlamydophila pneumoniae, or Mycoplasma pneumoniae), consider antibiotic de-escalation to target atypical bacterial infection.    Blood Culture - Blood, Hand, Left [[id number]] Collected: 08/06/21 0740    Lab Status: Final result  Specimen: Blood from Hand, Left Updated: 08/11/21 0745     Blood Culture No growth at 5 days    COVID-19,[facility] IN-HOUSE,Nasal Swab No Transport Media 3-4 HR TAT - Swab, Nasal Cavity [[id number]]  (Normal) Collected: 08/06/21 0623    Lab Status: Final result Specimen: Swab from Nasal Cavity Updated: 08/06/21 0709     COVID19 Not Detected    Narrative:      Fact sheet for providers: https://www.fda.gov/media/171627/download     Fact sheet for patients: https://www.fda.gov/media/123794/download    Test performed by PCR.    Consider negative results in combination with clinical observations, patient history, and epidemiological information.    Blood Culture - Blood, Arm, Right [[id number]] Collected: 08/06/21 0603    Lab Status: Final result Specimen: Blood from Arm, Right Updated: 08/11/21 0715     Blood Culture No growth at 5 days          ECG/EMG Results (most recent)     Procedure Component Value Units Date/Time    ECG 12 Lead [[id number]] Collected: 08/06/21 1339     Updated: 08/06/21 1448     QT Interval 420 ms     Narrative:      HEART RATE= 66  bpm  RR Interval= 904  ms  ME Interval= 178  ms  P Horizontal Axis= 7  deg  P Front Axis= 59  deg  QRSD Interval= 149  ms  QT Interval= 420  ms  QRS Axis= -64  deg  T Wave Axis= 87  deg  - ABNORMAL ECG -  Sinus rhythm  RBBB and LAFB  c/w prior ecg, SR is now seen  Electronically Signed By: Neetu Chapin ([facility]) 06-Aug-2021 14:47:25  Date and Time of Study: 2021-08-06 13:39:31    ECG 12 Lead [[id number]] Collected: 08/06/21 1001     Updated: 08/06/21 1448     QT Interval 382 ms     Narrative:      HEART RATE= 128  bpm  RR Interval= 479  ms  ME Interval=   ms  P Horizontal Axis=   deg  P Front Axis=   deg  QRSD Interval= 152  ms  QT Interval= 382  ms  QRS Axis= 4  deg  T Wave Axis= 35  deg  - ABNORMAL ECG -  Atrial flutter with predominant 2:1 AV block  Right bundle branch block  Inferior infarct, old  No change from prior tracing  Electronically Signed By: Tavares  Neetu ([facility]) 06-Aug-2021 14:47:37  Date and Time of Study: 2021-08-06 10:01:18    ECG 12 Lead [[id number]] Collected: 08/06/21 0600     Updated: 08/06/21 1448     QT Interval 330 ms     Narrative:      HEART RATE= 133  bpm  RR Interval= 452  ms  UT Interval=   ms  P Horizontal Axis=   deg  P Front Axis=   deg  QRSD Interval= 142  ms  QT Interval= 330  ms  QRS Axis= -67  deg  T Wave Axis= 9  deg  - ABNORMAL ECG -  Atrial flutter with predominant 2:1 AV block  Right bundle branch block  c/w prior ecg, atrial flutter is now seen  Electronically Signed By: Neetu Chapin ([facility]) 06-Aug-2021 14:47:59  Date and Time of Study: 2021-08-06 06:00:40    Adult Transthoracic Echo Complete W/ Cont if Necessary Per Protocol [[id number]] Collected: 08/06/21 1342     Updated: 08/06/21 1535     BSA 1.9 m^2      IVSd 1.4 cm      LVIDd 4.3 cm      LVIDs 2.3 cm      LVPWd 1.1 cm      IVS/LVPW 1.2     FS 47.1 %      EDV(Teich) 84.4 ml      ESV(Teich) 17.9 ml      EF(Teich) 78.8 %      EDV(cubed) 81.2 ml      ESV(cubed) 12.0 ml      EF(cubed) 85.2 %      LV mass(C)d 197.0 grams      LV mass(C)dI 103.4 grams/m^2      SV(Teich) 66.5 ml      SI(Teich) 34.9 ml/m^2      SV(cubed) 69.2 ml      SI(cubed) 36.3 ml/m^2      Ao root diam 2.8 cm      Ao root area 6.2 cm^2      asc Aorta Diam 3.4 cm      LVOT diam 1.8 cm      LVOT area 2.5 cm^2      LVOT area(traced) 2.5 cm^2      LVLd ap4 7.0 cm      EDV(MOD-sp4) 78.4 ml      LVLs ap4 5.8 cm      ESV(MOD-sp4) 23.9 ml      EF(MOD-sp4) 69.5 %      LVLd ap2 6.6 cm      EDV(MOD-sp2) 50.0 ml      LVLs ap2 6.0 cm      ESV(MOD-sp2) 22.1 ml      EF(MOD-sp2) 55.8 %      SV(MOD-sp4) 54.5 ml      SI(MOD-sp4) 28.6 ml/m^2      SV(MOD-sp2) 27.9 ml      SI(MOD-sp2) 14.6 ml/m^2      Ao root area (BSA corrected) 1.5     LV Borden Vol (BSA corrected) 41.2 ml/m^2      LV Sys Vol (BSA corrected) 12.5 ml/m^2      TAPSE (>1.6) 1.6 cm      EF(MOD-bp) 63.8 %      MV E max keith 112.5 cm/sec      MV A max keith 59.1 cm/sec       MV E/A 1.9     MV V2 mean 82.8 cm/sec      MV mean PG 3.0 mmHg      MV V2 VTI 15.3 cm      MVA(VTI) 2.6 cm^2      MV P1/2t max keith 133.0 cm/sec      MV P1/2t 45.8 msec      MVA(P1/2t) 4.8 cm^2      MV dec slope 851.0 cm/sec^2      MV dec time 150 sec      Ao pk keith 160.0 cm/sec      Ao max PG 10.2 mmHg      Ao max PG (full) 4.1 mmHg      Ao V2 mean 116.0 cm/sec      Ao mean PG 6.0 mmHg      Ao mean PG (full) 3.0 mmHg      Ao V2 VTI 23.6 cm      KIKI(I,A) 1.7 cm^2      KIKI(I,D) 1.7 cm^2      KIKI(V,A) 2.0 cm^2      KIKI(V,D) 2.0 cm^2      LV V1 max PG 6.2 mmHg      LV V1 mean PG 3.0 mmHg      LV V1 max 124.0 cm/sec      LV V1 mean 77.7 cm/sec      LV V1 VTI 15.6 cm      SV(Ao) 145.3 ml      SI(Ao) 76.3 ml/m^2      SV(LVOT) 39.7 ml      SI(LVOT) 20.8 ml/m^2      PA V2 max 109.0 cm/sec      PA max PG 4.8 mmHg      PA acc time 0.06 sec      RV V1 mean PG 1.0 mmHg      RV V1 mean 38.9 cm/sec      RV V1 VTI 8.8 cm      RAP systole 3.0 mmHg      PA pr(Accel) 52.9 mmHg      MVA P1/2T LCG 1.7 cm^2      RV Base 3.1 cm      RV Length 6.3 cm      RV Mid 2.3 cm      RV S' 13.6 cm/sec       CV ECHO EDUARDO - BZI_BMI 38.5 kilograms/m^2       CV ECHO EDUARDO - BSA(HAYCOCK) 2.0 m^2       CV ECHO EDUARDO - BZI_METRIC_WEIGHT 92.5 kg       CV ECHO EDUARDO - BZI_METRIC_HEIGHT 154.9 cm      Target HR (85%) 120 bpm      Max. Pred. HR (100%) 141 bpm       CV VAS BP RIGHT /52 mmHg      Dimensionless Index 0.70 (DI)      LA Volume Index 22.0 mL/m2      EF - Contrast (4Ch) 64.0 cm2     Narrative:      · Left ventricular wall thickness is consistent with mild to moderate   septal asymmetric hypertrophy.  · Calculated left ventricular EF = 63.8% Estimated left ventricular EF was   in agreement with the calculated left ventricular EF. Left ventricular   systolic function is normal.  · Mildly reduced right ventricular systolic function noted.       ECG 12 Lead [[id number]] Collected: 08/07/21 1013     Updated: 08/07/21 1319     QT Interval 416  ms     Narrative:      HEART RATE= 70  bpm  RR Interval= 856  ms  OR Interval= 180  ms  P Horizontal Axis= -24  deg  P Front Axis= 47  deg  QRSD Interval= 151  ms  QT Interval= 416  ms  QRS Axis= -37  deg  T Wave Axis= 40  deg  - ABNORMAL ECG -  Sinus rhythm  Right bundle branch block  NO SIGNIFICANT CHANGE FROM PREVIOUS ECG  Electronically Signed By: Pedro Jamison ([facility]) 07-Aug-2021 13:18:51  Date and Time of Study: 2021-08-07 10:13:18    ECG 12 Lead [[id number]] Collected: 08/08/21 0813     Updated: 08/08/21 0958     QT Interval 458 ms     Narrative:      HEART RATE= 63  bpm  RR Interval= 948  ms  OR Interval= 192  ms  P Horizontal Axis= -11  deg  P Front Axis= 60  deg  QRSD Interval= 159  ms  QT Interval= 458  ms  QRS Axis= -32  deg  T Wave Axis= -11  deg  - ABNORMAL ECG -  Sinus rhythm  Right bundle branch block  NO SIGNIFICANT CHANGE FROM PREVIOUS ECG  Electronically Signed By: Pedro Jamisno ([facility]) 08-Aug-2021 09:57:53  Date and Time of Study: 2021-08-08 08:13:09    ECG 12 Lead [[id number]] Collected: 08/09/21 0627     Updated: 08/09/21 1010     QT Interval 445 ms     Narrative:      HEART RATE= 63  bpm  RR Interval= 952  ms  OR Interval= 200  ms  P Horizontal Axis= 0  deg  P Front Axis= 51  deg  QRSD Interval= 154  ms  QT Interval= 445  ms  QRS Axis= -67  deg  T Wave Axis= 23  deg  - ABNORMAL ECG -  Sinus rhythm  RBBB and LAFB  No change from prior tracing  Electronically Signed By: Mira Nails ([facility]) 09-Aug-2021 10:09:37  Date and Time of Study: 2021-08-09 06:27:48              Results for orders placed during the hospital encounter of 08/06/21    Adult Transthoracic Echo Complete W/ Cont if Necessary Per Protocol    Interpretation Summary  · Left ventricular wall thickness is consistent with mild to moderate septal asymmetric hypertrophy.  · Calculated left ventricular EF = 63.8% Estimated left ventricular EF was in agreement with the calculated left ventricular EF. Left ventricular systolic function is  normal.  · Mildly reduced right ventricular systolic function noted.      CT Head Without Contrast    Result Date: 8/10/2021  Senescent changes without acute abnormality. Signer Name: Henry Mendiola MD  Signed: 8/10/2021 11:23 AM  Workstation Name: [id number]  [facility]    CT Chest Without Contrast Diagnostic    Result Date: 8/7/2021  Patchy left lower lobe infiltrates Mild bibasilar atelectasis Stable 6 mm nodule right upper lobe unchanged in 2017 Signer Name: Henry Mendiola MD  Signed: 8/7/2021 2:59 PM  Workstation Name: [id number]  [facility] Video Swallow With Speech Single Contrast    Result Date: 8/9/2021  1. No penetration or aspiration. Limited study.  This report was finalized on 8/9/2021 11:56 AM by Dr. Navarro Smalls MD.      XR Chest 1 View    Result Date: 8/6/2021  1.  Masslike opacity or consolidation of the medial left lung base behind the heart, unchanged since 06/30/2021. Consider chest CT, preferably with IV contrast, for further characterization. 2.  Increasing patchy infiltrates in both lung bases.  This report was finalized on 8/6/2021 7:28 AM by Dr. Jacques Bates MD.      XR Chest PA & Lateral    Result Date: 8/4/2021  1.  New or increasing reticulonodular infiltrates in both lower lobes. 2.  Nonresolving airspace consolidation in the medial left lower lobe. 3.  Consider chest CT imaging for more detailed assessment. Diagnostic considerations include atypical pulmonary infection or recurrent aspiration pneumonia. 4.  Stat final copy of this report was sent to the ordering physician's office following this dictation with telephone notification and documentation.  This report was finalized on 8/4/2021 4:41 PM by Dr. Jacques Bates MD.        Xrays, labs reviewed personally by me.  08/12/21  12:48 PM EDT      Condition on Discharge:    Stable    Discharge Diet:   Dietary Orders (From admission, onward)     Start     Ordered    08/08/21 1800   Dietary Nutrition Supplement: Boost Glucose Control (Glucerna Shake)  Daily With Breakfast, Lunch & Dinner     Comments: Patient likes chocolate   Question:  Select Supplement:  Answer:  Boost Glucose Control (Glucerna Shake)    08/08/21 1342    08/07/21 1730  Diet Regular; Cardiac, Consistent Carbohydrate, GI Soft  Diet Effective Now     Question Answer Comment   Diet Texture / Consistency Regular    Common Modifiers Cardiac    Common Modifiers Consistent Carbohydrate    Common Modifiers GI Soft        08/07/21 1729                Activity at Discharge:   Activity Instructions     Activity as Tolerated            Follow-up Appointments    Future Appointments   Date Time Provider Department Center   8/18/2021 10:00 AM LAB CHAIR 1 [facility]   8/18/2021 10:20 AM INJ/PORT CHAIR 4 [facility] INFUS [facility]   8/25/2021 10:00 AM LAB CHAIR 1 [facility]   8/25/2021 10:20 AM INJ/PORT CHAIR 4 CBC [facility] BH INFUS LAG [facility]   8/31/2021 10:30 AM Cristin Christie APRN [facility]   9/1/2021 10:00 AM LAB CHAIR 1 [facility]   9/1/2021 10:20 AM INJ/PORT CHAIR 4 [facility] BH INFUS [facility]   9/8/2021 10:20 AM LAB CHAIR 1 [facility]   9/8/2021 10:40 AM Elieser Headley MD [facility] CBC [facility]   9/8/2021 11:00 AM INJ/PORT CHAIR 4 [facility] BH INFUS LAG [facility]   9/29/2021  8:30 AM Keren Vences APRN [facility] OS [facility]   12/14/2021 11:00 AM Kehinde Sanon MD [facility]       Additional Instructions for the Follow-ups that You Need to Schedule     Discharge Follow-up with PCP   As directed       Currently Documented PCP:    Chari Mercado MD    PCP Phone Number:    [phone]     Follow Up Details: If no PCP, call MD espinoza at [phone]            Contact information for follow-up providers     Chari Mercado MD .    Specialties: Internal Medicine & Pediatrics, Pediatrics  Why: If no PCP, call MD espinoza at [phone]  Contact  information:  [address]  [address]  [place] [address]  [phone]                   Contact information for after-discharge care     Destination     [facility] .    Service: Skilled Nursing  Contact information:  [address]  [facility] [address] [phone]                              Test Results Pending at Discharge       Risk for Readmission (LACE) Score: 13 (8/12/2021  6:01 AM)        Time: I spent  more than 35  minutes on this discharge activity which included: face-to-face encounter with the patient, reviewing the data in the system, coordination of the care with the nursing staff as well as consultants, documentation, and entering orders.           Kleber Crowley MD  08/12/21  12:48 EDT

## 2021-08-13 NOTE — CASE MANAGEMENT/SOCIAL WORK
Case Management Discharge Note      Final Note: Discharge to [facility]    Provided Post Acute Provider List?: N/A  Provided Post Acute Provider Quality & Resource List?: N/A    Selected Continued Care - Discharged on 8/12/2021 Admission date: 8/6/2021 - Discharge disposition: Skilled Nursing Facility (DC - External)    Destination Coordination complete.    Service Provider Selected Services Address Phone Fax Patient Preferred    [facility]  [address] [facility] [address] [phone] --          Durable Medical Equipment    No services have been selected for the patient.              Dialysis/Infusion    No services have been selected for the patient.              Home Medical Care    No services have been selected for the patient.              Therapy    No services have been selected for the patient.              Community Resources    No services have been selected for the patient.              Community & DME    No services have been selected for the patient.                Selected Continued Care - Episodes Includes selections from active Coordinated Care Management episodes    High Risk Care Management Episode start date: 8/13/2021 (Paused)   There are no active outsourced providers for this episode.                    Final Discharge Disposition Code: 03 - skilled nursing facility (SNF)

## 2021-08-20 NOTE — PROGRESS NOTES
PATIENT NAME:  Joya Singh  YOB: 1940  PATIENT'S SEX:  female    PATIENT'S ADDRESS:  [address] [place] [id number]  PROVIDER:      No diagnosis found.  Allergies   Allergen Reactions   • Baclofen Anxiety     Panic attack, nightmares   • Codeine Itching and Rash   • Lisinopril Cough   • Morphine Hives   • Penicillins Rash     Tolerates cephalosporins          PROCRIT INJECTION    DATE      8/20/2021      Dose  60,000 UNITS    Subcutaneous Injections     Repeat every weeks.    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

## 2021-08-20 NOTE — TELEPHONE ENCOUNTER
Talked to the nurse of the patient Jose J and let him know that I just faxed the order for Proc[facility]. Nurse v/u.

## 2021-08-27 PROBLEM — J12.82 PNEUMONIA DUE TO COVID-19 VIRUS: Status: ACTIVE | Noted: 2021-01-01

## 2021-08-27 PROBLEM — U07.1 PNEUMONIA DUE TO COVID-19 VIRUS: Status: ACTIVE | Noted: 2021-01-01

## 2021-08-30 PROBLEM — D89.832 CYTOKINE RELEASE SYNDROME, GRADE 2: Status: ACTIVE | Noted: 2021-01-01

## 2021-08-30 PROBLEM — N17.9 ACUTE KIDNEY INJURY (HCC): Status: ACTIVE | Noted: 2021-01-01

## 2021-08-31 NOTE — TELEPHONE ENCOUNTER
LAUREN WITH [facility] CALLED TO SPEAK WITH [facility] REGARDING ORDERS THAT NEEDED TO BE SIGNED AND FAXED BACK. ATTEMPTED TO WARM TRANSFER AND WAS INSTRUCTED TO SEND AN ENCOUNTER     CALL BACK     [phone]

## 2021-09-01 NOTE — CASE MANAGEMENT/SOCIAL WORK
Case Management Discharge Note      Final Note: dc to [facility] SNF    Provided Post Acute Provider List?: N/A  Provided Post Acute Provider Quality & Resource List?: N/A    Selected Continued Care - Discharged on 8/30/2021 Admission date: 8/26/2021 - Discharge disposition: Rehab Facility or Unit (DC - External)    Destination    No services have been selected for the patient.              Durable Medical Equipment    No services have been selected for the patient.              Dialysis/Infusion    No services have been selected for the patient.              Home Medical Care    No services have been selected for the patient.              Therapy    No services have been selected for the patient.              Community Resources    No services have been selected for the patient.              Community & DME    No services have been selected for the patient.                Selected Continued Care - Episodes Includes selections from active Coordinated Care Management episodes    High Risk Care Management Episode start date: 8/13/2021 (Paused)   There are no active outsourced providers for this episode.             Selected Continued Care - Prior Encounters Includes selections from prior encounters from 5/28/2021 to 8/30/2021    Discharged on 8/12/2021 Admission date: 8/6/2021 - Discharge disposition: Skilled Nursing Facility (DC - External)    Destination     Service Provider Selected Services Address Phone Fax Patient Preferred    [facility] [place]  Skilled Nursing [address] [facility] [place] [address] [phone] --                         Final Discharge Disposition Code: 03 - skilled nursing facility (SNF)

## 2021-09-07 NOTE — TELEPHONE ENCOUNTER
Nicole is calling needing to have orders from July for pt to be faxed to her and signed. She does not need plan of care but needs the over due orders signed.    C/b [phone]

## 2021-09-09 NOTE — ED PROVIDER NOTES
Subjective     Head Injury  Location:  Occipital  Time since incident:  1 hour  Mechanism of injury comment:  Fall from bed at nh, c/o head injury, no loc/vomnit  Pain details:     Quality:  Dull    Severity:  Mild    Timing:  Constant    Progression:  Unchanged  Chronicity:  New  Relieved by:  Nothing  Worsened by:  Nothing  Associated symptoms: headache, nausea and neck pain    Associated symptoms: no blurred vision, no difficulty breathing, no disorientation, no focal weakness, no loss of consciousness, no numbness and no vomiting        Review of Systems   Eyes: Negative for blurred vision.   Gastrointestinal: Positive for nausea. Negative for vomiting.   Musculoskeletal: Positive for neck pain.   Neurological: Positive for headaches. Negative for focal weakness, loss of consciousness and numbness.   All other systems reviewed and are negative.      Past Medical History:   Diagnosis Date   • Allergic rhinitis    • Anemia    • Anxiety    • Appetite absent    • Arthritis    • Asthma    • Back pain    • Bell's palsy    • Black tarry stools    • Blood in stool    • Chronic fatigue    • CKD (chronic kidney disease) stage 3, GFR 30-59 ml/min (CMS/[facility]ly [facility])    • Community acquired pneumonia of left lung 10/25/2018   • Cough    • Depression    • Diabetes mellitus (CMS/[facility])     LAST A1C 6   • Diabetic gastroparesis (CMS/Formerly [facility]) 2/19/2016   • Difficulty walking    • Excessive urination at night    • Frequent urination    • GERD (gastroesophageal reflux disease)    • GI bleed    • Gout    • H/O blood clots     LEFT LEG 7 OR 8 YEARS AGO   • Heat intolerance    • History of fall 10/2018   • History of prior pregnancies     x8, miscarriage 5   • History of transfusion 11/2018    due to anemia   • Hyperlipidemia    • Hypertension    • Hypothyroidism    • Normal coronary arteries     by cath 2013   • Orthostatic hypotension    • AARON (obstructive sleep apnea)     DOESNT WEAR REGULARLY   • Pneumonia    • PONV (postoperative nausea and  vomiting)    • Skin cancer    • Stroke (CMS/HCC)     Several mini-strokes   • TIA (transient ischemic attack)     LAST TIA JULY 2017   • Urination pain    • UTI (urinary tract infection)     Dec 2018 and Jan 2019       Allergies   Allergen Reactions   • Baclofen Anxiety     Panic attack, nightmares   • Codeine Itching and Rash   • Lisinopril Cough   • Morphine Hives   • Penicillins Rash     Tolerates cephalosporins        Past Surgical History:   Procedure Laterality Date   • BACK SURGERY      HARDWARE   • CHOLECYSTECTOMY      OPEN   • COLONOSCOPY  2011    due for repeat in 2021   • COLONOSCOPY N/A 10/28/2018    Procedure: COLONOSCOPY;  Surgeon: Emmanuel Rogers MD;  Location: [facility] OR;  Service: Gastroenterology   • ENDOSCOPY N/A 10/26/2018    Procedure: ESOPHAGOGASTRODUODENOSCOPY;  Surgeon: Emmanuel Rogers MD;  Location: [facility] OR;  Service: Gastroenterology   • ENDOSCOPY N/A 5/13/2021    Procedure: ESOPHAGOGASTRODUODENOSCOPY, biopsy, dilatation;  Surgeon: Emmanuel Rogers MD;  Location: [facility] OR;  Service: Gastroenterology;  Laterality: N/A;  Esophagitis; Dilation- no stricture; Biopsy- esophagus   • HYSTERECTOMY      PARTIAL    • KNEE SURGERY     • NECK SURGERY     • SPINE SURGERY     • TUMOR REMOVAL Left     Leg   • UPPER GASTROINTESTINAL ENDOSCOPY  2014    gastritis.  done by dr. hastings       Family History   Problem Relation Age of Onset   • Lupus Mother    • Heart failure Mother 59   • Heart disease Other    • Hypertension Other    • Heart attack Father    • Breast cancer Neg Hx        Social History     Socioeconomic History   • Marital status:      Spouse name: Not on file   • Number of children: 3   • Years of education: High School   • Highest education level: Not on file   Tobacco Use   • Smoking status: Never Smoker   • Smokeless tobacco: Never Used   • Tobacco comment: CAFFEINE USE: NONE   Vaping Use   • Vaping Use: Never used   Substance and Sexual Activity    • Alcohol use: No   • Drug use: No   • Sexual activity: Defer     Comment: EXERCISE - RARELY           Objective   Physical Exam  Vitals and nursing note reviewed.   Constitutional:       Appearance: Normal appearance. She is not ill-appearing or diaphoretic.   HENT:      Head:      Comments: ttp post scalp     Right Ear: Tympanic membrane normal.      Left Ear: Tympanic membrane normal.      Nose: Nose normal. No rhinorrhea.      Mouth/Throat:      Mouth: Mucous membranes are moist.   Eyes:      Extraocular Movements: Extraocular movements intact.      Conjunctiva/sclera: Conjunctivae normal.      Pupils: Pupils are equal, round, and reactive to light.   Cardiovascular:      Rate and Rhythm: Normal rate and regular rhythm.      Pulses: Normal pulses.      Heart sounds: Normal heart sounds.   Pulmonary:      Effort: Pulmonary effort is normal.      Breath sounds: Normal breath sounds. No wheezing or rhonchi.   Abdominal:      General: Abdomen is flat. There is no distension.      Tenderness: There is no abdominal tenderness. There is no guarding.   Musculoskeletal:         General: No swelling, tenderness, deformity or signs of injury. Normal range of motion.      Cervical back: Normal range of motion and neck supple. No tenderness.   Skin:     General: Skin is warm and dry.      Findings: No erythema or rash.   Neurological:      General: No focal deficit present.      Mental Status: She is alert and oriented to person, place, and time. Mental status is at baseline.   Psychiatric:         Mood and Affect: Mood normal.         Thought Content: Thought content normal.         Procedures           ED Course  ED Course as of Sep 09 0850   Thu Sep 09, 2021   0849 Reeval, stable exam, neuro intact    [BC]      ED Course User Index  [BC] Sebastien Rodriguez MD                                           [facility]    Final diagnoses:   Closed head injury without concussion, initial encounter       ED Disposition  ED  Disposition     ED Disposition Condition Comment    Discharge Stable           Chari Mercado MD  [address] [facility] [address]  [facility] [id number] [phone]    Schedule an appointment as soon as possible for a visit   As needed    [facility] Emergency Department  [address] [facility] [address] [phone]    As needed         Medication List      No changes were made to your prescriptions during this visit.          Sebastien Rodriguez MD  09/09/21 3084

## 2021-09-09 NOTE — ED NOTES
Nursing facility reports that patient rolled out of her bed and hit the back of her head this morning. Patient has a knot on her left posterior head.      Roseann Buckner RN  09/09/21 0802

## 2021-09-14 NOTE — ED PROVIDER NOTES
"Subjective     History provided by:  Patient, medical records, EMS personnel and nursing home    History of Present Illness    · Chief complaint: Low hemoglobin and syncope    · Location: At the nursing home    · Quality/Severity: Patient had a hemoglobin of 7.  She reportedly had a syncopal episode at the nursing home, but the patient denies this stating she had a panic attack.    · Timing/Onset: The blood was drawn today.  Her syncopal episode/panic attack recurred just prior to arrival.    · Modifying Factors: The patient has a history of myelodysplasia and has required multiple transfusions in the past.  She is anticoagulated with Eliquis.    · Associated symptoms: She denies any blood in her stools or black stools.    · Narrative: The patient is a 81-year-old white female who is a resident of the [facility].  She had routine labs drawn today and had a hemoglobin of 7.0.  On 8/30/2021 (15 days ago) her hemoglobin was 9.8 hematocrit 31.6.  The patient has a history of myelodysplasia and multiple transfusions in the past.  She is anticoagulated with Eliquis for a flutter, but denies any bloody or melanotic stools.  Nursing home reports she had a syncopal episode just prior to arrival.  The patient states she had a panic attack where she felt short of breath and burning up\" threatened to pass out\" if they did not take her to the bathroom.  She denies actually passing out.    Review of Systems   Constitutional: Positive for fatigue. Negative for activity change, appetite change, chills, diaphoresis and fever.   HENT: Negative for congestion, dental problem, ear pain, rhinorrhea, sore throat and trouble swallowing.    Eyes: Negative for pain and visual disturbance.   Respiratory: Positive for shortness of breath ( During her panic attack). Negative for cough, chest tightness, wheezing and stridor.    Cardiovascular: Negative for chest pain, palpitations and leg swelling.   Gastrointestinal: Positive " for abdominal pain ( Chronic) and nausea ( Chronic). Negative for blood in stool, diarrhea and vomiting.   Endocrine: Negative for polydipsia and polyuria.   Genitourinary: Negative for difficulty urinating, dysuria, flank pain, frequency, pelvic pain and urgency.   Musculoskeletal: Negative for back pain, neck pain and neck stiffness.   Skin: Negative for rash.   Neurological: Positive for dizziness ( Chronic vertigo). Negative for weakness, light-headedness and headaches.   Psychiatric/Behavioral: Negative for hallucinations. The patient is nervous/anxious.      Past Medical History:   Diagnosis Date   • Allergic rhinitis    • Anemia    • Anxiety    • Appetite absent    • Arthritis    • Asthma    • Back pain    • Bell's palsy    • Black tarry stools    • Blood in stool    • Chronic fatigue    • CKD (chronic kidney disease) stage 3, GFR 30-59 ml/min (CMS/[facility])    • Community acquired pneumonia of left lung 10/25/2018   • Cough    • Depression    • Diabetes mellitus (CMS/[facility])     LAST A1C 6   • Diabetic gastroparesis (CMS/HCC) 2/19/2016   • Difficulty walking    • Excessive urination at night    • Frequent urination    • GERD (gastroesophageal reflux disease)    • GI bleed    • Gout    • H/O blood clots     LEFT LEG 7 OR 8 YEARS AGO   • Heat intolerance    • History of fall 10/2018   • History of prior pregnancies     x8, miscarriage 5   • History of transfusion 11/2018    due to anemia   • Hyperlipidemia    • Hypertension    • Hypothyroidism    • Normal coronary arteries     by cath 2013   • Orthostatic hypotension    • AARON (obstructive sleep apnea)     DOESNT WEAR REGULARLY   • Pneumonia    • PONV (postoperative nausea and vomiting)    • Skin cancer    • Stroke (CMS/[facility])     Several mini-strokes   • TIA (transient ischemic attack)     LAST TIA JULY 2017   • Urination pain    • UTI (urinary tract infection)     Dec 2018 and Jan 2019     /63   Pulse 97   Temp 97.8 °F (36.6 °C) (Oral)   Resp 20   Ht 154.9 cm  "(61\")   Wt 79.8 kg (176 lb)   SpO2 100%   BMI 33.25 kg/m²     Past Medical History:   Diagnosis Date   • Allergic rhinitis    • Anemia    • Anxiety    • Appetite absent    • Arthritis    • Asthma    • Back pain    • Bell's palsy    • Black tarry stools    • Blood in stool    • Chronic fatigue    • CKD (chronic kidney disease) stage 3, GFR 30-59 ml/min (CMS/[facility])    • Community acquired pneumonia of left lung 10/25/2018   • Cough    • Depression    • Diabetes mellitus (CMS/[facility])     LAST A1C 6   • Diabetic gastroparesis (CMS/[facility]) 2/19/2016   • Difficulty walking    • Excessive urination at night    • Frequent urination    • GERD (gastroesophageal reflux disease)    • GI bleed    • Gout    • H/O blood clots     LEFT LEG 7 OR 8 YEARS AGO   • Heat intolerance    • History of fall 10/2018   • History of prior pregnancies     x8, miscarriage 5   • History of transfusion 11/2018    due to anemia   • Hyperlipidemia    • Hypertension    • Hypothyroidism    • Normal coronary arteries     by cath 2013   • Orthostatic hypotension    • AARON (obstructive sleep apnea)     DOESNT WEAR REGULARLY   • Pneumonia    • PONV (postoperative nausea and vomiting)    • Skin cancer    • Stroke (CMS/[facility])     Several mini-strokes   • TIA (transient ischemic attack)     LAST TIA JULY 2017   • Urination pain    • UTI (urinary tract infection)     Dec 2018 and Jan 2019       Allergies   Allergen Reactions   • Baclofen Anxiety     Panic attack, nightmares   • Codeine Itching and Rash   • Lisinopril Cough   • Morphine Hives   • Penicillins Rash     Tolerates cephalosporins        Past Surgical History:   Procedure Laterality Date   • BACK SURGERY      HARDWARE   • CHOLECYSTECTOMY      OPEN   • COLONOSCOPY  2011    due for repeat in 2021   • COLONOSCOPY N/A 10/28/2018    Procedure: COLONOSCOPY;  Surgeon: Emmanuel Rogers MD;  Location: [facility];  Service: Gastroenterology   • ENDOSCOPY N/A 10/26/2018    Procedure: " ESOPHAGOGASTRODUODENOSCOPY;  Surgeon: Emmanuel Rogers MD;  Location:  [facility];  Service: Gastroenterology   • ENDOSCOPY N/A 5/13/2021    Procedure: ESOPHAGOGASTRODUODENOSCOPY, biopsy, dilatation;  Surgeon: Emmanuel Rogers MD;  Location:  [facility];  Service: Gastroenterology;  Laterality: N/A;  Esophagitis; Dilation- no stricture; Biopsy- esophagus   • HYSTERECTOMY      PARTIAL    • KNEE SURGERY     • NECK SURGERY     • SPINE SURGERY     • TUMOR REMOVAL Left     Leg   • UPPER GASTROINTESTINAL ENDOSCOPY  2014    gastritis.  done by dr. hastings       Family History   Problem Relation Age of Onset   • Lupus Mother    • Heart failure Mother 59   • Heart disease Other    • Hypertension Other    • Heart attack Father    • Breast cancer Neg Hx        Social History     Socioeconomic History   • Marital status:      Spouse name: Not on file   • Number of children: 3   • Years of education: High School   • Highest education level: Not on file   Tobacco Use   • Smoking status: Never Smoker   • Smokeless tobacco: Never Used   • Tobacco comment: CAFFEINE USE: NONE   Vaping Use   • Vaping Use: Never used   Substance and Sexual Activity   • Alcohol use: No   • Drug use: No   • Sexual activity: Defer     Comment: EXERCISE - RARELY           Objective   Physical Exam  Vitals and nursing note reviewed.   Constitutional:       General: She is not in acute distress.     Appearance: Normal appearance. She is well-developed. She is obese. She is not ill-appearing, toxic-appearing or diaphoretic.   HENT:      Head: Normocephalic and atraumatic.      Nose: Nose normal.      Mouth/Throat:      Mouth: Mucous membranes are moist.      Pharynx: Oropharynx is clear. No oropharyngeal exudate or posterior oropharyngeal erythema.   Eyes:      General: No scleral icterus.        Right eye: No discharge.         Left eye: No discharge.      Extraocular Movements: Extraocular movements intact.      Conjunctiva/sclera:  Conjunctivae normal.      Pupils: Pupils are equal, round, and reactive to light.   Neck:      Thyroid: No thyromegaly.      Vascular: No JVD.   Cardiovascular:      Rate and Rhythm: Normal rate and regular rhythm.      Heart sounds: Normal heart sounds. No murmur heard.     Pulmonary:      Effort: Pulmonary effort is normal.      Breath sounds: Normal breath sounds. No wheezing, rhonchi or rales.   Chest:      Chest wall: No tenderness.   Abdominal:      General: Bowel sounds are normal. There is no distension.      Palpations: Abdomen is soft.      Tenderness: There is abdominal tenderness ( Patient report is chronic). There is guarding ( Voluntary). There is no right CVA tenderness, left CVA tenderness or rebound.   Genitourinary:     Rectum: Guaiac result negative.      Comments: Digital exam of the rectum reveals brown stool that is heme-negative.  Musculoskeletal:         General: No tenderness or deformity. Normal range of motion.      Cervical back: Normal range of motion and neck supple.   Lymphadenopathy:      Cervical: No cervical adenopathy.   Skin:     General: Skin is warm and dry.      Capillary Refill: Capillary refill takes less than 2 seconds.      Findings: No rash.   Neurological:      General: No focal deficit present.      Mental Status: She is alert and oriented to person, place, and time.      Cranial Nerves: No cranial nerve deficit.      Coordination: Coordination normal.      Comments: No focal motor sensory deficit   Psychiatric:         Mood and Affect: Mood normal.         Behavior: Behavior normal.         Thought Content: Thought content normal.         Judgment: Judgment normal.         Procedures           ED Course  ED Course as of Sep 14 1701   Tue Sep 14, 2021   1606 My interpretation of the patient's EKG tracing performed 15: 44 is normal sinus rhythm with a rate of 99, wide QRS complex with a QRS duration of 153 due to right bundle branch block and left anterior fascicular  block, no acute ST segment elevation or depression consistent with ischemia, poor R wave transition, no change in comparison to tracing 8/26/2021.    [TP]   1658 Review the patient's test results: Her CBC has a low hemoglobin of 7.5 and a low hematocrit 24.3 which is a significant change from 8/30/2021 when her hemoglobin was 9.8 and hematocrit 31.6.  Her white count was normal at 4.2 and platelets were normal at 169.  Cardiac troponin was negative.  proBNP within normal limits.  Blood type B+.  CMP had an elevated blood glucose of 214, a slightly low sodium of 133 with a normal potassium of 3.9.  Her BUN was normal at 16 with an elevated creatinine of 1.49 and a diminished GFR of 34 which is consistent with her baseline consistent with chronic kidney disease.    [TP]   1659 16: 20 patient discussed with REY Christian hospitalist, who stated she would contact Dr. Headley, the patient's hematologist.    [TP]   1700 16: 37 I again spoke with REY Christian hospitalist, who states that she spoke with Dr. Headley and she states that Dr. Headley's office will arrange for the patient to return to the [facility] unit for transfusion.    [TP]   1700 I discussed with the patient her test results and treatment plan.    [TP]      ED Course User Index  [TP] Christos Lee MD                                           MDM  Number of Diagnoses or Management Options  Acute on chronic anemia: established and worsening  Myelodysplasia (myelodysplastic syndrome) (CMS/HCC): established and worsening     Amount and/or Complexity of Data Reviewed  Clinical lab tests: ordered and reviewed  Discuss the patient with other providers: yes    Risk of Complications, Morbidity, and/or Mortality  Presenting problems: high  Diagnostic procedures: moderate  Management options: high    Patient Progress  Patient progress: stable      Final diagnoses:   Myelodysplasia (myelodysplastic syndrome) (CMS/HCC)   Acute on chronic anemia       ED  Disposition  ED Disposition     ED Disposition Condition Comment    Discharge Stable             Dr. Headley will arrange for the patient to go to [facility] tomorrow for transfusion.  Go to   As scheduled    Elieser Headley MD  [address] [facility] [address]  [place] [address] [phone]               Medication List      Changed    insulin aspart 100 UNIT/ML injection  Commonly known as: novoLOG  Inject 5 Units under the skin into the appropriate area as directed 3 (Three) Times a Day With Meals.  What changed: additional instructions            Labs Reviewed   COMPREHENSIVE METABOLIC PANEL - Abnormal; Notable for the following components:       Result Value    Glucose 214 (*)     Creatinine 1.49 (*)     Sodium 133 (*)     Chloride 96 (*)     CO2 29.7 (*)     Calcium 7.7 (*)     Albumin 2.60 (*)     Alkaline Phosphatase 148 (*)     eGFR Non  Amer 34 (*)     All other components within normal limits    Narrative:     GFR Normal >60  Chronic Kidney Disease <60  Kidney Failure <15     PROTIME-INR - Abnormal; Notable for the following components:    Protime 25.1 (*)     INR 2.34 (*)     All other components within normal limits    Narrative:     Therapeutic Ranges for INR: 2.0-3.0 (PT 20-30)                              2.5-3.5 (PT 25-34)   APTT - Abnormal; Notable for the following components:    PTT 64.7 (*)     All other components within normal limits    Narrative:     PTT = The equivalent PTT values for the therapeutic range of heparin levels at 0.1 to 0.7 U/ml are 53 to 110 seconds.     CBC WITH AUTO DIFFERENTIAL - Abnormal; Notable for the following components:    RBC 2.70 (*)     Hemoglobin 7.5 (*)     Hematocrit 24.3 (*)     MCHC 30.9 (*)     RDW 19.4 (*)     RDW-SD 62.0 (*)     MPV 13.4 (*)     Lymphocyte % 15.0 (*)     Immature Grans % 1.0 (*)     Lymphocytes, Absolute 0.63 (*)     All other components within normal limits   LIPASE - Normal   BNP (IN-HOUSE) - Normal    Narrative:     Among patients with  dyspnea, NT-proBNP is highly sensitive for the detection of acute congestive heart failure. In addition NT-proBNP of <300 pg/ml effectively rules out acute congestive heart failure with 99% negative predictive value.    Results may be falsely decreased if patient taking Biotin.     TROPONIN (IN-HOUSE) - Normal    Narrative:     Troponin T Reference Range:  <= 0.03 ng/mL-   Negative for AMI  >0.03 ng/mL-     Abnormal for myocardial necrosis.  Clinicians would have to utilize clinical acumen, EKG, Troponin and serial changes to determine if it is an Acute Myocardial Infarction or myocardial injury due to an underlying chronic condition.       Results may be falsely decreased if patient taking Biotin.     TYPE AND SCREEN   CBC AND DIFFERENTIAL    Narrative:     The following orders were created for panel order CBC & Differential.  Procedure                               Abnormality         Status                     ---------                               -----------         ------                     CBC Auto Differential[[id number]]        Abnormal            Final result               Scan Slide[[id number]]                                                                    Please view results for these tests on the individual orders.     No orders to display          Medication List      Changed    insulin aspart 100 UNIT/ML injection  Commonly known as: novoLOG  Inject 5 Units under the skin into the appropriate area as directed 3 (Three) Times a Day With Meals.  What changed: additional instructions                 Christos Lee MD  09/14/21 [id number]

## 2021-09-14 NOTE — PROGRESS NOTES
Contacted by Dr. Lee regarding need for blood transfusion on this patient. She has transfusion dependent MDS.  Discussed patient with Dr. Headley who has been her outpatient provider for some time.  Hemoglobin has been 7.5 in the past in his office, and nonemergent transfusion has been arranged. She has not followed up with him since she has been in a rehab facility. Dr. Headley will arrange outpatient infusion in our [facility] for tomorrow.  Informed Dr. Lee of this plan. Patient to return tomorrow when Dr. Headley is able to arrange this.

## 2021-09-14 NOTE — DISCHARGE INSTRUCTIONS
Dr. Headley's office will arrange for the patient to receive an outpatient transfusion at the [facility] unit tomorrow.

## 2021-09-16 NOTE — NURSING NOTE
Patient received 2 units PRBC while comfortable in bed.  Tolerated procedure well. Lunch provided.  Transfusion completed at 1600.  Patient incontinent of stool and urine.  Cleaned and an adult incontinent diaper provided.  Assisted patient to W/c and discharged home with her family at 1625. ANISH Kathleen

## 2021-09-16 NOTE — PATIENT INSTRUCTIONS
Call Dr. Elieser Headley, [facility] at [phone]   Blood Transfusion, Adult, Care After  This sheet gives you information about how to care for yourself after your procedure. Your doctor may also give you more specific instructions. If you have problems or questions, contact your doctor.  What can I expect after the procedure?  After the procedure, it is common to have:  · Bruising and soreness at the IV site.  · A fever or chills on the day of the procedure. This may be your body's response to the new blood cells received.  · A headache.  Follow these instructions at home:  Insertion site care         · Follow instructions from your doctor about how to take care of your insertion site. This is where an IV tube was put into your vein. Make sure you:  ? Wash your hands with soap and water before and after you change your bandage (dressing). If you cannot use soap and water, use hand .  ? Change your bandage as told by your doctor.  · Check your insertion site every day for signs of infection. Check for:  ? Redness, swelling, or pain.  ? Bleeding from the site.  ? Warmth.  ? Pus or a bad smell.  General instructions  · Take over-the-counter and prescription medicines only as told by your doctor.  · Rest as told by your doctor.  · Go back to your normal activities as told by your doctor.  · Keep all follow-up visits as told by your doctor. This is important.  Contact a doctor if:  · You have itching or red, swollen areas of skin (hives).  · You feel worried or nervous (anxious).  · You feel weak after doing your normal activities.  · You have redness, swelling, warmth, or pain around the insertion site.  · You have blood coming from the insertion site, and the blood does not stop with pressure.  · You have pus or a bad smell coming from the insertion site.  Get help right away if:  · You have signs of a serious reaction. This may be coming from an allergy or the body's defense system (immune system). Signs  "include:  ? Trouble breathing or shortness of breath.  ? Swelling of the face or feeling warm (flushed).  ? Fever or chills.  ? Head, chest, or back pain.  ? Dark pee (urine) or blood in the pee.  ? Widespread rash.  ? Fast heartbeat.  ? Feeling dizzy or light-headed.  You may receive your blood transfusion in an outpatient setting. If so, you will be told whom to contact to report any reactions.  These symptoms may be an emergency. Do not wait to see if the symptoms will go away. Get medical help right away. Call your local emergency services (911 in the [place] Do not drive yourself to the hospital.  Summary  · Bruising and soreness at the IV site are common.  · Check your insertion site every day for signs of infection.  · Rest as told by your doctor. Go back to your normal activities as told by your doctor.  · Get help right away if you have signs of a serious reaction.  This information is not intended to replace advice given to you by your health care provider. Make sure you discuss any questions you have with your health care provider.  Document Revised: 06/11/2020 Document Reviewed: 06/11/2020  [employer] Patient Education © 2021 [employer]   if you have any problems or concerns.    We know you have a Choice in healthcare and appreciate you using [facility].  Our purpose is to provide you \"Excellent Care\".  We hope that you will always choose us in the future and continue to recommend us to your family and friends.              "

## 2021-09-16 NOTE — NURSING NOTE
NURSING PROGRESS NOTE  Pt to [facility] per  scheduled appointment.  Pt ID verified by (2) identifiers. Allergies, medications and medical history reviewed and verified.Consent signed for blood transfusion. Kezia Benítez RN

## 2021-09-27 NOTE — TELEPHONE ENCOUNTER
Pt's nurse reports pt having n/v daily without any relief with their efforts.  Nurse wanted to know if this may be r/t medications we prescribe or diagnosis.  Pt is currently prescribed eliquis and procrit by Dr. Headley and neither are known to have s/e of n/v to this extent.  Suggested this may need a GI evaluation.

## 2021-09-27 NOTE — TELEPHONE ENCOUNTER
Caller: Isaac Singh    Relationship to patient: Emergency Contact    Best call back number: [phone]    Chief complaint: CANC. APPTS.    Type of visit: LAB/INJECTION    Requested date: NONE, WILL BE IN ON THE 5TH OF OCT. & NURSING HOME IS HANDLING THESE LABS & INJECTION FOR 9/29/2021.    When is the original appointment: 9/29/2021

## 2021-09-27 NOTE — TELEPHONE ENCOUNTER
Said she has been nauseas for weeks and vomiting everyday.  Needs to see if it could be her medicine.

## 2021-10-08 NOTE — TELEPHONE ENCOUNTER
Received fax for Joya Singh from [facility] requesting for patient to hold her Eliquis for 3 days prior to her (NAOMI) injections. she was seen today 10/8/21.

## 2021-10-08 NOTE — PROGRESS NOTES
Date of Office Visit: 10/08/2021  Encounter Provider: REY Castro  Place of Service: [facility] CARDIOLOGY  Patient Name: Joya Singh  :1940  Primary Cardiologist: Dr. Sanon    CC:  2-4 week follow up     Dear Dr. Mercado    HPI: Joya Singh is a pleasant 81 y.o. female who presents 10/08/2021 for cardiac follow up.  She is a new patient to me and I reviewed her past medical records.  She is a patient of Dr. Sanon.    She carried a reported history of CAD, but a cardiac cath was performed at [facility] in , and showed no significant intraluminal coronary atherosclerosis. There was extraluminal coronary artery calcification.  She has brittle diabetes, a history of stroke, and hypertension.      In , a Cardiolite showed a very small, extremely mild reversible defect in the distal anterior wall.  She was not having angina, and I felt this was an extremely low risk result. I recommended medical therapy.     In 2018 she was seen in our [facility] for myriad complaints, which were predominantly fatigue and lightheadedness.  It was noted that her medication list was extensive, that she had significant diabetic dysautonomia, and significant chronic anemia from MDS.  It was not felt that she had an acute cardiac issue.  An echo at that time was WNL for age.      She continued to have orthostatic symptoms and was ultimately  been placed on midodrine. She has been hospitalized for aspiration pneumonia since we saw her last. She has a lot of dysphagia. She has lost a lot of weight. She requires blood transfusions almost every other week.     Patient was hospitalized from -2021. The patient was followed in consultation by pulmonary, and nephrology due to respiratory failure with new oxygen requirement and acute kidney injury.  She was able to be weaned off oxygen.  They were unable to do a walking oximetry due to her chronic immobility.  She did receive  "remdesivir and Decadron, and completed full course.  Her ferritin level remained around 8000 throughout her admission.  She did receive 2 units of packed red blood cells for hemoglobin of 7.4 as recommended by hematology.  She was to follow-up with Dr. Headley at her regular scheduled appointment for September 8.  Her creatinine trended down to better than baseline with nephrology following.  She was to follow-up with him as outpatient as well.  Her polypharmacy was managed with removal of multiple medications during the admission.  She was discharged with orders for speech therapy, PT/OT at SNF and consider for palliative care if she continued to decline with repeated admissions for similar concerns (recurrent aspiration pneumonia) in the past.  She requested DNR/DNI status.    She presented to the emergency department on 9/14/2021 secondary to results of routine labs that had been drawn the same day.  Her hemoglobin was 7.0.  On 8/30/2021 her hemoglobin was 9.8 with hematocrit of 31.6.  It is noted that she has a history of myelodysplasia and multiple transfusions in the past.  She is also anticoagulated with Eliquis for atrial flutter.  She denied any bloody or melenic stools.  The nursing home reported that she had had a syncopal episode just prior to arrival.  The patient stated that she had a panic attack where she felt short of breath and was \"burning up and threatened to pass out\" if they did not take her to the bathroom.  She denied actually passing out.  Hospitalist was contacted for possible admission, but instead Dr. Headley was contacted and patient was to follow-up with them for blood transfusion.    She returns today in follow-up.  She is accompanied by a staff member from [facility].  Patient's biggest complaint is constant nausea that she states been going on for couple of months.  Her chaperone stated that [facility] wanted to make sure that the nausea was not from anything " "cardiac.  It is noted patient has gastroparesis secondary to diabetes.  She has medication for nausea.  Also at some point she was taking off the midodrine.  Her blood pressure is low 92/60 today.  I am restarting the midodrine 2.5 mg 3 times daily that she was on before.  She also states that she occasionally has chest pressure and 2 days ago at work her up from her sleep.  She does get short of breath if she tries to stand.  She is mostly wheelchair-bound.  She has intermittent lower extremity edema, but today she has none.  She does complain of some dizziness.  She complains of fatigue.  She is compliant with her CPAP.  She states \"I do not feel well and I just want some answers\".  She is on oxygen 2 L per nasal cannula.  Past Medical History:   Diagnosis Date   • Allergic rhinitis    • Anemia    • Anxiety    • Appetite absent    • Arthritis    • Asthma    • Back pain    • Bell's palsy    • Black tarry stools    • Blood in stool    • Chronic fatigue    • CKD (chronic kidney disease) stage 3, GFR 30-59 ml/min ([facility])    • Community acquired pneumonia of left lung 10/25/2018   • Cough    • Depression    • Diabetes mellitus ([facility])     LAST A1C 6   • Diabetic gastroparesis ([facility]) 2/19/2016   • Difficulty walking    • Excessive urination at night    • Frequent urination    • GERD (gastroesophageal reflux disease)    • GI bleed    • Gout    • H/O blood clots     LEFT LEG 7 OR 8 YEARS AGO   • Heat intolerance    • History of fall 10/2018   • History of prior pregnancies     x8, miscarriage 5   • History of transfusion 11/2018    due to anemia   • Hyperlipidemia    • Hypertension    • Hypothyroidism    • Normal coronary arteries     by cath 2013   • Orthostatic hypotension    • AARON (obstructive sleep apnea)     DOESNT WEAR REGULARLY   • Pneumonia    • PONV (postoperative nausea and vomiting)    • Skin cancer    • Stroke ([facility])     Several mini-strokes   • TIA (transient ischemic attack)     LAST TIA JULY 2017   • Urination " pain    • UTI (urinary tract infection)     Dec 2018 and Jan 2019       Past Surgical History:   Procedure Laterality Date   • BACK SURGERY      HARDWARE   • CHOLECYSTECTOMY      OPEN   • COLONOSCOPY  2011    due for repeat in 2021   • COLONOSCOPY N/A 10/28/2018    Procedure: COLONOSCOPY;  Surgeon: Emmanuel Rogers MD;  Location: [facility] OR;  Service: Gastroenterology   • ENDOSCOPY N/A 10/26/2018    Procedure: ESOPHAGOGASTRODUODENOSCOPY;  Surgeon: Emmanuel Rogers MD;  Location:  [facility] OR;  Service: Gastroenterology   • ENDOSCOPY N/A 5/13/2021    Procedure: ESOPHAGOGASTRODUODENOSCOPY, biopsy, dilatation;  Surgeon: Emmanuel Rogers MD;  Location: [facility] OR;  Service: Gastroenterology;  Laterality: N/A;  Esophagitis; Dilation- no stricture; Biopsy- esophagus   • HYSTERECTOMY      PARTIAL    • KNEE SURGERY     • NECK SURGERY     • SPINE SURGERY     • TUMOR REMOVAL Left     Leg   • UPPER GASTROINTESTINAL ENDOSCOPY  2014    gastritis.  done by dr. hastings       Social History     Socioeconomic History   • Marital status:      Spouse name: Not on file   • Number of children: 3   • Years of education: High School   • Highest education level: Not on file   Tobacco Use   • Smoking status: Never Smoker   • Smokeless tobacco: Never Used   • Tobacco comment: CAFFEINE USE: NONE   Vaping Use   • Vaping Use: Never used   Substance and Sexual Activity   • Alcohol use: No   • Drug use: No   • Sexual activity: Defer     Comment: EXERCISE - RARELY       Family History   Problem Relation Age of Onset   • Lupus Mother    • Heart failure Mother 59   • Heart disease Other    • Hypertension Other    • Heart attack Father    • Breast cancer Neg Hx        The following portion of the patient's history were reviewed and updated as appropriate: past medical history, past surgical history, past social history, past family history, allergies, current medications, and problem list.    Review of Systems    Constitutional: Positive for malaise/fatigue. Negative for diaphoresis and fever.   HENT: Negative for congestion, hearing loss, hoarse voice, nosebleeds and sore throat.    Eyes: Negative for photophobia, vision loss in left eye, vision loss in right eye and visual disturbance.   Cardiovascular: Positive for chest pain and leg swelling (intermittent). Negative for dyspnea on exertion, irregular heartbeat, near-syncope, orthopnea, palpitations, paroxysmal nocturnal dyspnea and syncope. Claudication: pressure.   Respiratory: Positive for shortness of breath. Negative for cough, hemoptysis, sleep disturbances due to breathing, snoring, sputum production and wheezing.    Endocrine: Negative for cold intolerance, heat intolerance, polydipsia, polyphagia and polyuria.   Hematologic/Lymphatic: Negative for bleeding problem. Does not bruise/bleed easily.   Skin: Negative for color change, dry skin, poor wound healing, rash and suspicious lesions.   Musculoskeletal: Negative for arthritis, back pain, falls, gout, joint pain, joint swelling, muscle cramps, muscle weakness and myalgias.   Gastrointestinal: Positive for nausea. Negative for bloating, abdominal pain, constipation, diarrhea, dysphagia, melena and vomiting.   Neurological: Positive for dizziness and weakness. Negative for excessive daytime sleepiness, headaches, light-headedness, loss of balance, numbness, paresthesias, seizures and vertigo.   Psychiatric/Behavioral: Negative for depression, memory loss and substance abuse. The patient is not nervous/anxious.        Allergies   Allergen Reactions   • Baclofen Anxiety     Panic attack, nightmares   • Codeine Itching and Rash   • Lisinopril Cough   • Morphine Hives   • Penicillins Rash     Tolerates cephalosporins          Current Outpatient Medications:   •  ACCU-CHEK FASTCLIX LANCETS misc, TEST 3-4 TIMES DAILY AS DIRECTED, Disp: 400 each, Rfl: 3  •  ACCU-CHEK SMARTVIEW test strip, TEST BLOOD SUGAR THREE TIMES  DAILY OR AS DIRECTED, Disp: 300 each, Rfl: 3  •  acetaminophen (TYLENOL) 325 MG tablet, Take 2 tablets by mouth Every 6 (Six) Hours As Needed for Mild Pain . OTC product, Disp: 40 tablet, Rfl: 0  •  amiodarone (PACERONE) 200 MG tablet, Take 200 mg by mouth Daily., Disp: , Rfl:   •  apixaban (Eliquis) 5 MG tablet tablet, Take 1 tablet by mouth 2 (Two) Times a Day., Disp: 180 tablet, Rfl: 1  •  atorvastatin (LIPITOR) 10 MG tablet, TAKE ONE TABLET BY MOUTH AT BEDTIME, Disp: 90 tablet, Rfl: 1  •  benzonatate (Tessalon Perles) 100 MG capsule, Take 1 capsule by mouth 3 (Three) Times a Day As Needed for Cough., Disp: , Rfl:   •  bisacodyl (DULCOLAX) 10 MG suppository, Insert 1 suppository into the rectum Daily As Needed for Constipation (Use if bisacodyl oral is ineffective)., Disp: , Rfl:   •  bisacodyl (DULCOLAX) 5 MG EC tablet, Take 1 tablet by mouth Daily As Needed for Constipation (Use if polyethylene glycol is ineffective)., Disp: , Rfl:   •  Blood Glucose Monitoring Suppl (ACCU-CHEK KENNETH SMARTVIEW) w/Device kit, TEST blood sugar three times daily or as directed, Disp: 1 kit, Rfl: 0  •  desvenlafaxine (PRISTIQ) 50 MG 24 hr tablet, TAKE ONE TABLET BY MOUTH EVERY DAY, Disp: 90 tablet, Rfl: 1  •  epoetin chu-epbx (Retacrit) 04471 UNIT/ML injection, Inject 60,000 Units under the skin into the appropriate area as directed 1 (One) Time Per Week. On Friday, Disp: , Rfl:   •  HYDROcodone-acetaminophen (NORCO) 5-325 MG per tablet, Take 1 tablet by mouth 2 (Two) Times a Day As Needed., Disp: , Rfl:   •  insulin aspart (novoLOG) 100 UNIT/ML injection, Inject 5 Units under the skin into the appropriate area as directed 3 (Three) Times a Day With Meals. (Patient taking differently: Inject 5 Units under the skin into the appropriate area as directed 3 (Three) Times a Day With Meals. Per sliding scale), Disp: , Rfl: 12  •  levothyroxine (SYNTHROID, LEVOTHROID) 88 MCG tablet, TAKE ONE TABLET BY MOUTH EVERY DAY, Disp: 90 tablet,  "Rfl: 1  •  meclizine (ANTIVERT) 12.5 MG tablet, Take 12.5 mg by mouth 2 (Two) Times a Day As Needed for Nausea., Disp: , Rfl:   •  melatonin 5 MG tablet tablet, Take 1 tablet by mouth At Night As Needed (Insomnia)., Disp: , Rfl:   •  metaxalone (SKELAXIN) 400 MG tablet, Take 1 tablet by mouth 3 (Three) Times a Day As Needed for Muscle Spasms., Disp: , Rfl:   •  Nebulizer device, 1 each Take As Directed., Disp: 1 each, Rfl: 0  •  Needle, Disp, (BD DISP NEEDLES) 30G X 1/2\" misc, To be used 3 times daily with Novolog Flexpen., Disp: 100 each, Rfl: 5  •  O2 (OXYGEN), Inhale 2 L/min Continuous. 1.5 L continuous with 2 L nightly, Disp: , Rfl:   •  omeprazole (priLOSEC) 40 MG capsule, Take 1 capsule by mouth 2 (two) times a day., Disp: 180 capsule, Rfl: 3  •  ondansetron (ZOFRAN) 4 MG tablet, Take 1 tablet by mouth Every 6 (Six) Hours As Needed for Nausea or Vomiting., Disp: , Rfl:   •  polyethylene glycol (MIRALAX) 17 g packet, Take 17 g by mouth Daily., Disp: , Rfl:   •  sennosides-docusate (senna-docusate sodium) 8.6-50 MG per tablet, Take 2 tablets by mouth Daily., Disp: 60 tablet, Rfl: 3  •  tamsulosin (FLOMAX) 0.4 MG capsule 24 hr capsule, Take 1 capsule by mouth Daily., Disp: 30 capsule, Rfl:   •  UltiCare Mini Pen Needles 31G X 6 MM misc, USE TO inject insulins FOUR TIMES DAILY AS DIRECTED, Disp: 400 each, Rfl: 3  •  Ventolin  (90 Base) MCG/ACT inhaler, INHALE TWO PUFFS BY MOUTH EVERY 4 HOURS AS NEEDED FOR WHEEZING, Disp: 18 g, Rfl: 3  •  guaiFENesin (ROBITUSSIN) 100 MG/5ML solution oral solution, Take 20 mL by mouth 4 (Four) Times a Day., Disp: , Rfl:   •  midodrine (PROAMATINE) 2.5 MG tablet, Take 1 tablet by mouth 3 (Three) Times a Day., Disp: 90 tablet, Rfl: 6  No current facility-administered medications for this visit.    Facility-Administered Medications Ordered in Other Visits:   •  acetaminophen (TYLENOL) tablet 650 mg, 650 mg, Oral, Q6H PRN, Elieser Headley MD  •  diphenhydrAMINE (BENADRYL) " "capsule 25 mg, 25 mg, Oral, Once, Elieser Headley MD        Objective:     Vitals:    10/08/21 1012   BP: 92/60   Pulse: 81   Resp: 16   SpO2: 99%   Height: 154.9 cm (61\")     Body mass index is 33.25 kg/m².      Vitals reviewed.   Constitutional:       General: Not in acute distress.     Appearance: Frail. Chronically ill-appearing.   Eyes:      General:         Right eye: No discharge.         Left eye: No discharge.      Conjunctiva/sclera: Conjunctivae normal.   HENT:      Head: Normocephalic and atraumatic.      Right Ear: External ear normal.      Left Ear: External ear normal.      Nose: Nose normal.   Neck:      Thyroid: No thyromegaly.      Vascular: No JVD.      Trachea: No tracheal deviation.      Lymphadenopathy: No cervical adenopathy.   Pulmonary:      Effort: Pulmonary effort is normal. No respiratory distress.      Breath sounds: Normal breath sounds. No wheezing. No rales.   Chest:      Chest wall: Not tender to palpatation.   Cardiovascular:      Normal rate. Regular rhythm.      No gallop.   Pulses:     Intact distal pulses.   Edema:     Peripheral edema absent.   Abdominal:      General: There is no distension.      Palpations: Abdomen is soft.      Tenderness: There is no abdominal tenderness.   Musculoskeletal: Normal range of motion.         General: No tenderness or deformity.      Cervical back: Normal range of motion and neck supple. Skin:     General: Skin is warm and dry.      Findings: No erythema or rash.   Neurological:      Mental Status: Alert and oriented to person, place, and time.      Coordination: Coordination normal.   Psychiatric:         Attention and Perception: Attention normal.         Mood and Affect: Mood is depressed.         Speech: Speech normal.         Behavior: Behavior normal. Behavior is cooperative.         Cognition and Memory: Cognition normal.               ECG 12 Lead    Date/Time: 10/8/2021 10:24 AM  Performed by: Cristin Christie APRN  Authorized by: " Cristin Christie APRN   Comparison: compared with previous ECG from 9/14/2021  Similar to previous ECG  Rhythm: sinus rhythm  Rate: normal  Conduction: right bundle branch block and non-specific intraventricular conduction delay  T inversion: III, aVF, V1, V2, V3 and V4  T flattening: aVL, V5 and V6  QRS axis: indeterminate    Clinical impression: abnormal EKG              Assessment:       Diagnosis Plan   1. Chronic diastolic CHF (congestive heart failure) (HCC)     2. Typical atrial flutter (HCC)     3. Essential hypertension     4. Orthostatic hypotension     5. Precordial pain  Stress Test With Myocardial Perfusion One Day          Plan:       1.  Chronic diastolic CHF.-Appears euvolemic today.  She is currently not on any diuretics.    2.  Atypical a flutter.  She is in sinus rhythm.  She is anticoagulated with Eliquis.  She is on amiodarone.    3/4.  Essential hypertension-orthostatic hypotension-she has orthostasis due to diabetic dysautonomia.  Since being in the nursing home, for approximately a month and a half, it appears that her midodrine got stopped.  She had been doing well on that in the past.  I am to restart that at 2.5 mg 3 times daily.     5.    Chest pain-she has had some atypical chest pain in the past.  She states she has had more pressure in the last few weeks that is actually waking her up at night.  She has not had a stress test since 2017.  Also at the facility stated they wanted to make sure that the nausea was not related to anything cardiac.  We will check a stress test.      6.  Nausea-she states she is have chronic nausea for months now.  I did discuss with her that she does have gastroparesis and this could be part of her problem.  The facility wanted to make sure that we ruled out anything cardiac wise that could be contributing.     Check stress test  RTO in 4 months with TERAK        As always, it has been a pleasure to participate in your patient's care. Thank you.       Sincerely,        Dimitris Christie, APRN      Current Outpatient Medications:   •  ACCU-CHEK FASTCLIX LANCETS misc, TEST 3-4 TIMES DAILY AS DIRECTED, Disp: 400 each, Rfl: 3  •  ACCU-CHEK SMARTVIEW test strip, TEST BLOOD SUGAR THREE TIMES DAILY OR AS DIRECTED, Disp: 300 each, Rfl: 3  •  acetaminophen (TYLENOL) 325 MG tablet, Take 2 tablets by mouth Every 6 (Six) Hours As Needed for Mild Pain . OTC product, Disp: 40 tablet, Rfl: 0  •  amiodarone (PACERONE) 200 MG tablet, Take 200 mg by mouth Daily., Disp: , Rfl:   •  apixaban (Eliquis) 5 MG tablet tablet, Take 1 tablet by mouth 2 (Two) Times a Day., Disp: 180 tablet, Rfl: 1  •  atorvastatin (LIPITOR) 10 MG tablet, TAKE ONE TABLET BY MOUTH AT BEDTIME, Disp: 90 tablet, Rfl: 1  •  benzonatate (Tessalon Perles) 100 MG capsule, Take 1 capsule by mouth 3 (Three) Times a Day As Needed for Cough., Disp: , Rfl:   •  bisacodyl (DULCOLAX) 10 MG suppository, Insert 1 suppository into the rectum Daily As Needed for Constipation (Use if bisacodyl oral is ineffective)., Disp: , Rfl:   •  bisacodyl (DULCOLAX) 5 MG EC tablet, Take 1 tablet by mouth Daily As Needed for Constipation (Use if polyethylene glycol is ineffective)., Disp: , Rfl:   •  Blood Glucose Monitoring Suppl (ACCU-CHEK KENNETH SMARTVIEW) w/Device kit, TEST blood sugar three times daily or as directed, Disp: 1 kit, Rfl: 0  •  desvenlafaxine (PRISTIQ) 50 MG 24 hr tablet, TAKE ONE TABLET BY MOUTH EVERY DAY, Disp: 90 tablet, Rfl: 1  •  epoetin chu-epbx (Retacrit) 48670 UNIT/ML injection, Inject 60,000 Units under the skin into the appropriate area as directed 1 (One) Time Per Week. On Friday, Disp: , Rfl:   •  HYDROcodone-acetaminophen (NORCO) 5-325 MG per tablet, Take 1 tablet by mouth 2 (Two) Times a Day As Needed., Disp: , Rfl:   •  insulin aspart (novoLOG) 100 UNIT/ML injection, Inject 5 Units under the skin into the appropriate area as directed 3 (Three) Times a Day With Meals. (Patient taking differently: Inject 5 Units under the skin  "into the appropriate area as directed 3 (Three) Times a Day With Meals. Per sliding scale), Disp: , Rfl: 12  •  levothyroxine (SYNTHROID, LEVOTHROID) 88 MCG tablet, TAKE ONE TABLET BY MOUTH EVERY DAY, Disp: 90 tablet, Rfl: 1  •  meclizine (ANTIVERT) 12.5 MG tablet, Take 12.5 mg by mouth 2 (Two) Times a Day As Needed for Nausea., Disp: , Rfl:   •  melatonin 5 MG tablet tablet, Take 1 tablet by mouth At Night As Needed (Insomnia)., Disp: , Rfl:   •  metaxalone (SKELAXIN) 400 MG tablet, Take 1 tablet by mouth 3 (Three) Times a Day As Needed for Muscle Spasms., Disp: , Rfl:   •  Nebulizer device, 1 each Take As Directed., Disp: 1 each, Rfl: 0  •  Needle, Disp, (BD DISP NEEDLES) 30G X 1/2\" misc, To be used 3 times daily with Novolog Flexpen., Disp: 100 each, Rfl: 5  •  O2 (OXYGEN), Inhale 2 L/min Continuous. 1.5 L continuous with 2 L nightly, Disp: , Rfl:   •  omeprazole (priLOSEC) 40 MG capsule, Take 1 capsule by mouth 2 (two) times a day., Disp: 180 capsule, Rfl: 3  •  ondansetron (ZOFRAN) 4 MG tablet, Take 1 tablet by mouth Every 6 (Six) Hours As Needed for Nausea or Vomiting., Disp: , Rfl:   •  polyethylene glycol (MIRALAX) 17 g packet, Take 17 g by mouth Daily., Disp: , Rfl:   •  sennosides-docusate (senna-docusate sodium) 8.6-50 MG per tablet, Take 2 tablets by mouth Daily., Disp: 60 tablet, Rfl: 3  •  tamsulosin (FLOMAX) 0.4 MG capsule 24 hr capsule, Take 1 capsule by mouth Daily., Disp: 30 capsule, Rfl:   •  UltiCare Mini Pen Needles 31G X 6 MM misc, USE TO inject insulins FOUR TIMES DAILY AS DIRECTED, Disp: 400 each, Rfl: 3  •  Ventolin  (90 Base) MCG/ACT inhaler, INHALE TWO PUFFS BY MOUTH EVERY 4 HOURS AS NEEDED FOR WHEEZING, Disp: 18 g, Rfl: 3  •  guaiFENesin (ROBITUSSIN) 100 MG/5ML solution oral solution, Take 20 mL by mouth 4 (Four) Times a Day., Disp: , Rfl:   •  midodrine (PROAMATINE) 2.5 MG tablet, Take 1 tablet by mouth 3 (Three) Times a Day., Disp: 90 tablet, Rfl: 6  No current " facility-administered medications for this visit.    Facility-Administered Medications Ordered in Other Visits:   •  acetaminophen (TYLENOL) tablet 650 mg, 650 mg, Oral, Q6H PRN, Elieser Headley MD  •  diphenhydrAMINE (BENADRYL) capsule 25 mg, 25 mg, Oral, Once, Elieser Headley MD    Dictated utilizing Dragon dictation

## 2021-10-08 NOTE — TELEPHONE ENCOUNTER
Reviewed the paperwork.  Patient may hold her Eliquis for 2 days prior and restart the day after.  This is been faxed to [facility] pain [facility].

## 2021-10-11 NOTE — TELEPHONE ENCOUNTER
Please call to reassure pt I will be happy to see her when she goes back home.  I hope she feels better soon

## 2021-10-11 NOTE — TELEPHONE ENCOUNTER
Caller: Joya Singh    Relationship: Self    Best call back number: [phone]    What is the best time to reach you:     Who are you requesting to speak with (clinical staff, provider,  specific staff member): DR. REA    Do you know the name of the person who called:     What was the call regarding: [facility]. PATIENT CALLED TO LET DR. REA KNOW THAT SHE IS STILL IN [facility]. PATIENT STATES TO LET DR. REA KNOW THAT IS SICK, AND SHE DOES NOT WANT TO BE TAKING OFF THE BOOKS AS A DR. REA PATIENT. PATIENT STATES THAT SHE HAS TO SEE HER CARDIOLOGIST AND ENT AND SHE WILL CALL BACK.     Do you require a callback:          THANKS

## 2021-10-11 NOTE — TELEPHONE ENCOUNTER
Keren with [facility] called to request again.   I have re faxed and received a confirmation.  [phone]

## 2021-10-13 NOTE — TELEPHONE ENCOUNTER
Spoke with Keren today about Pt's epidural injection. They needed the clearance to state three days of holding Dimitris noriega approved this. Verbalized to Keren, faxed form and received confirmation fax    completed

## 2021-10-18 NOTE — ED PROVIDER NOTES
Subjective   History of Present Illness  History of Present Illness    Chief complaint: Dizziness, lightheadedness, fatigue    Location: Generalized symptoms    Quality/Severity: Moderate dizziness and fatigue    Timing/Duration: Worsening over the past week    Modifying Factors: None    Narrative: This patient presents to us from nursing home for evaluation of worsening dizziness and fatigue symptoms.  She tells me that she feels this way whenever her hemoglobin gets too low when she needs a transfusion.  She has a history of myelodysplastic syndrome and does require occasional blood transfusions.  Her last hospitalization was about 1 month ago for the same problem.  She denies chest pain or abdominal pains that are new.  She denies any vomiting.  She denies any blood in the stools or black or tarry colored stools.    Associated Symptoms: As above    Review of Systems   Constitutional: Positive for fatigue. Negative for activity change and fever.   HENT: Negative.    Respiratory: Negative for shortness of breath.    Cardiovascular: Negative for chest pain.   Gastrointestinal: Negative for abdominal pain, blood in stool and vomiting.   Musculoskeletal: Positive for myalgias.   Skin: Negative for color change.   Neurological: Positive for dizziness and light-headedness. Negative for syncope and headaches.   All other systems reviewed and are negative.      Past Medical History:   Diagnosis Date   • Allergic rhinitis    • Anemia    • Anxiety    • Appetite absent    • Arthritis    • Asthma    • Back pain    • Bell's palsy    • Black tarry stools    • Blood in stool    • Chronic fatigue    • CKD (chronic kidney disease) stage 3, GFR 30-59 ml/min ([facility])    • Community acquired pneumonia of left lung 10/25/2018   • Cough    • Depression    • Diabetes mellitus ([facility])     LAST A1C 6   • Diabetic gastroparesis ([facility]) 2/19/2016   • Difficulty walking    • Excessive urination at night    • Frequent urination    • GERD  (gastroesophageal reflux disease)    • GI bleed    • Gout    • H/O blood clots     LEFT LEG 7 OR 8 YEARS AGO   • Heat intolerance    • History of fall 10/2018   • History of prior pregnancies     x8, miscarriage 5   • History of transfusion 11/2018    due to anemia   • Hyperlipidemia    • Hypertension    • Hypothyroidism    • Normal coronary arteries     by cath 2013   • Orthostatic hypotension    • AARON (obstructive sleep apnea)     DOESNT WEAR REGULARLY   • Pneumonia    • PONV (postoperative nausea and vomiting)    • Skin cancer    • Stroke ([facility])     Several mini-strokes   • TIA (transient ischemic attack)     LAST TIA JULY 2017   • Urination pain    • UTI (urinary tract infection)     Dec 2018 and Jan 2019       Allergies   Allergen Reactions   • Baclofen Anxiety     Panic attack, nightmares   • Codeine Itching and Rash   • Lisinopril Cough   • Morphine Hives   • Penicillins Rash     Tolerates cephalosporins        Past Surgical History:   Procedure Laterality Date   • BACK SURGERY      HARDWARE   • CHOLECYSTECTOMY      OPEN   • COLONOSCOPY  2011    due for repeat in 2021   • COLONOSCOPY N/A 10/28/2018    Procedure: COLONOSCOPY;  Surgeon: Emmanuel Rogers MD;  Location: [facility] OR;  Service: Gastroenterology   • ENDOSCOPY N/A 10/26/2018    Procedure: ESOPHAGOGASTRODUODENOSCOPY;  Surgeon: Emmanuel Rogers MD;  Location: [facility] OR;  Service: Gastroenterology   • ENDOSCOPY N/A 5/13/2021    Procedure: ESOPHAGOGASTRODUODENOSCOPY, biopsy, dilatation;  Surgeon: Emmanuel Rogers MD;  Location: [facility] OR;  Service: Gastroenterology;  Laterality: N/A;  Esophagitis; Dilation- no stricture; Biopsy- esophagus   • HYSTERECTOMY      PARTIAL    • KNEE SURGERY     • NECK SURGERY     • SPINE SURGERY     • TUMOR REMOVAL Left     Leg   • UPPER GASTROINTESTINAL ENDOSCOPY  2014    gastritis.  done by dr. hastings       Family History   Problem Relation Age of Onset   • Lupus Mother    • Heart failure  Mother 59   • Heart disease Other    • Hypertension Other    • Heart attack Father    • Breast cancer Neg Hx        Social History     Socioeconomic History   • Marital status:    • Number of children: 3   • Years of education: High School   Tobacco Use   • Smoking status: Never Smoker   • Smokeless tobacco: Never Used   • Tobacco comment: CAFFEINE USE: NONE   Vaping Use   • Vaping Use: Never used   Substance and Sexual Activity   • Alcohol use: No   • Drug use: No   • Sexual activity: Defer     Comment: EXERCISE - RARELY     ED Triage Vitals [10/18/21 1357]   Temp Heart Rate Resp BP SpO2   98.1 °F (36.7 °C) 73 16 151/74 99 %      Temp src Heart Rate Source Patient Position BP Location FiO2 (%)   Oral Monitor Lying Right arm --     Objective   Physical Exam  Vitals and nursing note reviewed.   Constitutional:       General: She is not in acute distress.     Appearance: She is well-developed. She is not toxic-appearing or diaphoretic.      Comments: No sign of distress.  Pleasant elderly lady   HENT:      Head: Normocephalic and atraumatic.      Mouth/Throat:      Mouth: Mucous membranes are moist.   Eyes:      General:         Right eye: No discharge.         Left eye: No discharge.      Pupils: Pupils are equal, round, and reactive to light.   Cardiovascular:      Rate and Rhythm: Normal rate and regular rhythm.      Pulses: Normal pulses.      Heart sounds: No gallop.    Pulmonary:      Effort: Pulmonary effort is normal. No respiratory distress.      Breath sounds: Normal breath sounds. No stridor. No wheezing or rhonchi.   Abdominal:      Palpations: Abdomen is soft.      Tenderness: There is no abdominal tenderness. There is no guarding.   Musculoskeletal:         General: No swelling or deformity. Normal range of motion.      Cervical back: Normal range of motion and neck supple.   Skin:     General: Skin is warm and dry.      Coloration: Skin is not jaundiced.      Findings: No erythema or rash.    Neurological:      General: No focal deficit present.      Mental Status: She is alert and oriented to person, place, and time.      Motor: No weakness.   Psychiatric:         Behavior: Behavior normal.         Thought Content: Thought content normal.         Judgment: Judgment normal.       Results for orders placed or performed during the hospital encounter of 10/18/21   Comprehensive Metabolic Panel    Specimen: Blood   Result Value Ref Range    Glucose 161 (H) 65 - 99 mg/dL    BUN 14 8 - 23 mg/dL    Creatinine 1.51 (H) 0.57 - 1.00 mg/dL    Sodium 139 136 - 145 mmol/L    Potassium 4.0 3.5 - 5.2 mmol/L    Chloride 103 98 - 107 mmol/L    CO2 32.7 (H) 22.0 - 29.0 mmol/L    Calcium 7.8 (L) 8.6 - 10.5 mg/dL    Total Protein 6.1 6.0 - 8.5 g/dL    Albumin 2.80 (L) 3.50 - 5.20 g/dL    ALT (SGPT) 5 1 - 33 U/L    AST (SGOT) 16 1 - 32 U/L    Alkaline Phosphatase 126 (H) 39 - 117 U/L    Total Bilirubin 0.3 0.0 - 1.2 mg/dL    eGFR Non African Amer 33 (L) >60 mL/min/1.73    Globulin 3.3 gm/dL    A/G Ratio 0.8 g/dL    BUN/Creatinine Ratio 9.3 7.0 - 25.0    Anion Gap 3.3 (L) 5.0 - 15.0 mmol/L   aPTT    Specimen: Blood   Result Value Ref Range    PTT 44.0 (H) 24.3 - 38.1 seconds   Protime-INR    Specimen: Blood   Result Value Ref Range    Protime 20.8 (H) 12.1 - 15.0 Seconds    INR 1.82 (H) 0.90 - 1.10   CBC Auto Differential    Specimen: Blood   Result Value Ref Range    WBC 4.16 3.40 - 10.80 10*3/mm3    RBC 3.20 (L) 3.77 - 5.28 10*6/mm3    Hemoglobin 8.6 (L) 12.0 - 15.9 g/dL    Hematocrit 29.8 (L) 34.0 - 46.6 %    MCV 93.1 79.0 - 97.0 fL    MCH 26.9 26.6 - 33.0 pg    MCHC 28.9 (L) 31.5 - 35.7 g/dL    RDW 20.8 (H) 12.3 - 15.4 %    RDW-SD 66.6 (H) 37.0 - 54.0 fl    MPV 12.9 (H) 6.0 - 12.0 fL    Platelets 244 140 - 450 10*3/mm3    Neutrophil % 53.6 42.7 - 76.0 %    Lymphocyte % 32.5 19.6 - 45.3 %    Monocyte % 4.3 (L) 5.0 - 12.0 %    Eosinophil % 6.5 (H) 0.3 - 6.2 %    Basophil % 1.9 (H) 0.0 - 1.5 %    Immature Grans % 1.2 (H)  0.0 - 0.5 %    Neutrophils, Absolute 2.23 1.70 - 7.00 10*3/mm3    Lymphocytes, Absolute 1.35 0.70 - 3.10 10*3/mm3    Monocytes, Absolute 0.18 0.10 - 0.90 10*3/mm3    Eosinophils, Absolute 0.27 0.00 - 0.40 10*3/mm3    Basophils, Absolute 0.08 0.00 - 0.20 10*3/mm3    Immature Grans, Absolute 0.05 0.00 - 0.05 10*3/mm3    nRBC 0.0 0.0 - 0.2 /100 WBC   Troponin    Specimen: Blood   Result Value Ref Range    Troponin T <0.010 0.000 - 0.030 ng/mL   Type & Screen    Specimen: Blood   Result Value Ref Range    ABO Type B     RH type Positive     Antibody Screen Negative     T&S Expiration Date 10/21/2021 11:59:59 PM      *Note: Due to a large number of results and/or encounters for the requested time period, some results have not been displayed. A complete set of results can be found in Results Review.       Procedures           ED Course  ED Course as of 10/18/21 1557   Mon Oct 18, 2021   1525 I reviewed the labs from today's visit.  Patient does have anemia but her hemoglobin is 8.6 and does not merit emergent transfusion today in the hospital.  I spoke with Dr. Headley who agrees to arrange for urgent outpatient transfusion for her.  There does not appear to be any other kind of acute cardiac or metabolic emergencies here.  I will send the patient back to the nursing home and have her plan to return to the infusion center in the next 1 to 2 days for a transfusion per Dr. Headley's instructions. [EMMANUEL]      ED Course User Index  [EMMANUEL] Navarro Sutherland MD                                           MDM  Number of Diagnoses or Management Options     Amount and/or Complexity of Data Reviewed  Clinical lab tests: reviewed and ordered  Tests in the radiology section of CPT®: reviewed and ordered  Decide to obtain previous medical records or to obtain history from someone other than the patient: yes  Review and summarize past medical records: yes  Discuss the patient with other providers: yes (Dr. Headley, hematology)  Independent  visualization of images, tracings, or specimens: yes    Risk of Complications, Morbidity, and/or Mortality  Presenting problems: moderate  Diagnostic procedures: moderate  Management options: moderate        Final diagnoses:   Chronic anemia   Dizziness       ED Disposition  ED Disposition     ED Disposition Condition Comment    Discharge Stable           Elieser Headley MD  [address]  [place] [address]  [phone]    Go in 1 day  Follow-up for outpatient blood transfusion tomorrow as we discussed         Medication List      Changed    insulin aspart 100 UNIT/ML injection  Commonly known as: novoLOG  Inject 5 Units under the skin into the appropriate area as directed 3 (Three) Times a Day With Meals.  What changed: additional instructions             Navarro Sutherland MD  10/18/21 1602

## 2021-10-18 NOTE — ED NOTES
Dr. Headley was called and spoke to Dr. Sutherland regarding the patient.      Vita Fall  10/18/21 5612

## 2021-10-18 NOTE — DISCHARGE INSTRUCTIONS
Dr. Headley from the hematology office will contact you to make arrangements for an outpatient blood transfusion as we discussed.  Please return to the emergency room for any worsening pain, fevers, weakness, dizziness, nausea, difficulties breathing or any other concerns.

## 2021-10-19 NOTE — PATIENT INSTRUCTIONS
"  Call Dr. Elieser Headley, [facility] at [phone]   [email or web address]-blood/blood-donation-process/what-happens-to-donated-blood/blood-transfusions/types-of-[email or web address].html\"> [email or web address]://[email or web address].org/donate-blood/blood-donation-process/what-happens-to-donated-blood/blood-transfusions/risks-complications.html\">   Blood Transfusion, Adult, Care After  This sheet gives you information about how to care for yourself after your procedure. Your health care provider may also give you more specific instructions. If you have problems or questions, contact your health care provider.  What can I expect after the procedure?  After the procedure, it is common to have:  · Bruising and soreness where the IV was inserted.  · A fever or chills on the day of the procedure. This may be your body's response to the new blood cells received.  · A headache.  Follow these instructions at home:  IV insertion site care         · Follow instructions from your health care provider about how to take care of your IV insertion site. Make sure you:  ? Wash your hands with soap and water before and after you change your bandage (dressing). If soap and water are not available, use hand .  ? Change your dressing as told by your health care provider.  · Check your IV insertion site every day for signs of infection. Check for:  ? Redness, swelling, or pain.  ? Bleeding from the site.  ? Warmth.  ? Pus or a bad smell.  General instructions  · Take over-the-counter and prescription medicines only as told by your health care provider.  · Rest as told by your health care provider.  · Return to your normal activities as told by your health care provider.  · Keep all follow-up visits as told by your health care provider. This is important.  Contact a health care provider if:  · You have itching or red, swollen areas of skin (hives).  · You feel anxious.  · You feel weak after doing your normal activities.  · You " "have redness, swelling, warmth, or pain around the IV insertion site.  · You have blood coming from the IV insertion site that does not stop with pressure.  · You have pus or a bad smell coming from your IV insertion site.  Get help right away if:  · You have symptoms of a serious allergic or immune system reaction, including:  ? Trouble breathing or shortness of breath.  ? Swelling of the face or feeling flushed.  ? Fever or chills.  ? Pain in the head, back, or chest.  ? Dark urine or blood in the urine.  ? Widespread rash.  ? Fast heartbeat.  ? Feeling dizzy or light-headed.  If you receive your blood transfusion in an outpatient setting, you will be told whom to contact to report any reactions.  These symptoms may represent a serious problem that is an emergency. Do not wait to see if the symptoms will go away. Get medical help right away. Call your local emergency services (911 in the [place] Do not drive yourself to the hospital.  Summary  · Bruising and tenderness around the IV insertion site are common.  · Check your IV insertion site every day for signs of infection.  · Rest as told by your health care provider. Return to your normal activities as told by your health care provider.  · Get help right away for symptoms of a serious allergic or immune system reaction to blood transfusion.  This information is not intended to replace advice given to you by your health care provider. Make sure you discuss any questions you have with your health care provider.  Document Revised: 06/11/2020 Document Reviewed: 06/11/2020  [employer] Patient Education © 2021 [employer]   if you have any problems or concerns.    We know you have a Choice in healthcare and appreciate you using [facility].  Our purpose is to provide you \"Excellent Care\".  We hope that you will always choose us in the future and continue to recommend us to your family and friends.              Call Dr. Elieser Headley, [facility] at [phone] " [phone]   Blood Transfusion, Adult, Care After  This sheet gives you information about how to care for yourself after your procedure. Your doctor may also give you more specific instructions. If you have problems or questions, contact your doctor.  What can I expect after the procedure?  After the procedure, it is common to have:  · Bruising and soreness at the IV site.  · A fever or chills on the day of the procedure. This may be your body's response to the new blood cells received.  · A headache.  Follow these instructions at home:  Insertion site care         · Follow instructions from your doctor about how to take care of your insertion site. This is where an IV tube was put into your vein. Make sure you:  ? Wash your hands with soap and water before and after you change your bandage (dressing). If you cannot use soap and water, use hand .  ? Change your bandage as told by your doctor.  · Check your insertion site every day for signs of infection. Check for:  ? Redness, swelling, or pain.  ? Bleeding from the site.  ? Warmth.  ? Pus or a bad smell.  General instructions  · Take over-the-counter and prescription medicines only as told by your doctor.  · Rest as told by your doctor.  · Go back to your normal activities as told by your doctor.  · Keep all follow-up visits as told by your doctor. This is important.  Contact a doctor if:  · You have itching or red, swollen areas of skin (hives).  · You feel worried or nervous (anxious).  · You feel weak after doing your normal activities.  · You have redness, swelling, warmth, or pain around the insertion site.  · You have blood coming from the insertion site, and the blood does not stop with pressure.  · You have pus or a bad smell coming from the insertion site.  Get help right away if:  · You have signs of a serious reaction. This may be coming from an allergy or the body's defense system (immune system). Signs include:  ? Trouble breathing or shortness  "of breath.  ? Swelling of the face or feeling warm (flushed).  ? Fever or chills.  ? Head, chest, or back pain.  ? Dark pee (urine) or blood in the pee.  ? Widespread rash.  ? Fast heartbeat.  ? Feeling dizzy or light-headed.  You may receive your blood transfusion in an outpatient setting. If so, you will be told whom to contact to report any reactions.  These symptoms may be an emergency. Do not wait to see if the symptoms will go away. Get medical help right away. Call your local emergency services (911 in the [place] Do not drive yourself to the hospital.  Summary  · Bruising and soreness at the IV site are common.  · Check your insertion site every day for signs of infection.  · Rest as told by your doctor. Go back to your normal activities as told by your doctor.  · Get help right away if you have signs of a serious reaction.  This information is not intended to replace advice given to you by your health care provider. Make sure you discuss any questions you have with your health care provider.  Document Revised: 06/11/2020 Document Reviewed: 06/11/2020  [employer] Patient Education © 2021 [employer]   if you have any problems or concerns.    We know you have a Choice in healthcare and appreciate you using [facility].  Our purpose is to provide you \"Excellent Care\".  We hope that you will always choose us in the future and continue to recommend us to your family and friends.              "

## 2021-10-19 NOTE — NURSING NOTE
Patient arrived to [facility] via wheelchair at 0815. Informed consent was obtained and pre medications given.   Blood infusion of PRBC tolerated without complications. AVS discussed and copy given to pt. Patient discharged instable condition via wheelchair at 1330 to ER entrance for ride with shuttle to [facility].

## 2021-11-10 NOTE — PROGRESS NOTES
Spoke to [place] and the nuclear department.  Patient H&H has dropped and her current hemoglobin is 7.7.  Her oncology she is to have type and cross and infusion tomorrow at the [facility].  We will postpone her stress test and reschedule for about 3 weeks from now.  I have discussed this with Keshia Schmidt our  and she is aware.

## 2021-11-10 NOTE — TELEPHONE ENCOUNTER
Pt called in to report her hgb yesterday was 7.7. She is currently in [facility] and we have not received any faxed lab results from them. Called an spoke with staff and requested that the results be faxed over. Reviewed by  and orders placed for her to receive 2 units PRBC tomorrow at [facility]. Message sent to scheduling to arrange. Pt states she will be at the hospital tomorrow for an appointment for a stress test and would like to get the blood when she is here for that.

## 2021-11-10 NOTE — TELEPHONE ENCOUNTER
Caller: MARYCRUZ     Relationship to patient:   SELF   Best call back number: [phone]    PATIENT STATED THAT SHE DOESN'T SEE DR. PATTERSON UNTIL THE MIDDLE OF DEC. SHE WOULD LIKE TO SEE IF DR. BOWLES CAN GET HER IN FOR AN APPT. SHE SAID THAT SHE IS MISERABLE, CANT EAT, AND LOSING WEIGHT.   PLEASE CALL AND ADVISE   THANK YOU

## 2021-11-11 NOTE — PATIENT INSTRUCTIONS
"Call Dr. Elieser Headley, [facility] at [phone] if you have any problems or concerns.    We know you have a Choice in healthcare and appreciate you using [facility].  Our purpose is to provide you \"Excellent Care\".  We hope that you will always choose us in the future and continue to recommend us to your family and friends.        Blood Transfusion, Adult, Care After  This sheet gives you information about how to care for yourself after your procedure. Your doctor may also give you more specific instructions. If you have problems or questions, contact your doctor.  What can I expect after the procedure?  After the procedure, it is common to have:  · Bruising and soreness at the IV site.  · A fever or chills on the day of the procedure. This may be your body's response to the new blood cells received.  · A headache.  Follow these instructions at home:  Insertion site care         · Follow instructions from your doctor about how to take care of your insertion site. This is where an IV tube was put into your vein. Make sure you:  ? Wash your hands with soap and water before and after you change your bandage (dressing). If you cannot use soap and water, use hand .  ? Change your bandage as told by your doctor.  · Check your insertion site every day for signs of infection. Check for:  ? Redness, swelling, or pain.  ? Bleeding from the site.  ? Warmth.  ? Pus or a bad smell.  General instructions  · Take over-the-counter and prescription medicines only as told by your doctor.  · Rest as told by your doctor.  · Go back to your normal activities as told by your doctor.  · Keep all follow-up visits as told by your doctor. This is important.  Contact a doctor if:  · You have itching or red, swollen areas of skin (hives).  · You feel worried or nervous (anxious).  · You feel weak after doing your normal activities.  · You have redness, swelling, warmth, or pain around the insertion site.  · You have " blood coming from the insertion site, and the blood does not stop with pressure.  · You have pus or a bad smell coming from the insertion site.  Get help right away if:  · You have signs of a serious reaction. This may be coming from an allergy or the body's defense system (immune system). Signs include:  ? Trouble breathing or shortness of breath.  ? Swelling of the face or feeling warm (flushed).  ? Fever or chills.  ? Head, chest, or back pain.  ? Dark pee (urine) or blood in the pee.  ? Widespread rash.  ? Fast heartbeat.  ? Feeling dizzy or light-headed.  You may receive your blood transfusion in an outpatient setting. If so, you will be told whom to contact to report any reactions.  These symptoms may be an emergency. Do not wait to see if the symptoms will go away. Get medical help right away. Call your local emergency services (911 in the [place] Do not drive yourself to the hospital.  Summary  · Bruising and soreness at the IV site are common.  · Check your insertion site every day for signs of infection.  · Rest as told by your doctor. Go back to your normal activities as told by your doctor.  · Get help right away if you have signs of a serious reaction.  This information is not intended to replace advice given to you by your health care provider. Make sure you discuss any questions you have with your health care provider.  Document Revised: 06/11/2020 Document Reviewed: 06/11/2020  [employer] Patient Education © 2021 [employer]

## 2021-11-11 NOTE — NURSING NOTE
NURSING PROGRESS NOTE      . Tolerated prescribed treatment without incident. Patient received/ declined AVS, Pt verbalized understanding.  Discharged to nursing home , escorted to ED lobby @1650. Condition Satisfactory .  Kezia Benítez RN

## 2021-11-11 NOTE — NURSING NOTE
NURSING PROGRESS NOTE      Pt to [facility] per  scheduled appointment.  Pt ID verified by (2) identifiers. Allergies, medications and medical history reviewed and verified.Consent signed for Blood transfusion . Kezia Benítez RN

## 2021-11-23 NOTE — PROGRESS NOTES
PATIENT INFORMATION  Joya Singh       - 1940    CHIEF COMPLAINT  Chief Complaint   Patient presents with   • Weight Loss   • Anorexia       HISTORY OF PRESENT ILLNESS  Here for Post prandial bloating adfn mild dysphaia and vomiting, last was 4 days ago and descxribes bringing back up saliva not the foopd (cereal)    Is on nightly Norco and is > 10 years treated DM II      REVIEWED PERTINENT RESULTS/ LABS  Lab Results   Component Value Date    CASEREPORT  2021     Surgical Pathology Report                         Case: [id number]                                  Authorizing Provider:  Emmanuel Rogers        Collected:           2021 03:02 PM                                 MD Elvia                                                                   Ordering Location:     [facility]   Received:            2021 05:27 PM                                 OR                                                                           Pathologist:           Casey Boone MD                                                         Specimen:    Esophagus, Distal, Biopsies                                                                FINALDX  2021     1. Esophagus, Distal, Biopsy:  Benign squamous and gastric mucosa with   A. Intestinal metaplasia consistent with Gonzalez's esophagus, reactive changes and no dysplasia.   B. Chronic inflammation of the gastric mucosa.    Clint/kds       Lab Results   Component Value Date    HGB 8.6 (L) 10/18/2021    MCV 93.1 10/18/2021     10/18/2021    ALT 5 10/18/2021    AST 16 10/18/2021    HGBA1C 6.10 (H) 2021    INR 1.82 (H) 10/18/2021    TRIG 57 2021    FERRITIN 7,397.00 (H) 2021    IRON 76 2021    TIBC 121 (L) 2021      No results found.    REVIEW OF SYSTEMS  Review of Systems   Constitutional: Negative for activity change, chills, fever and unexpected weight change.   HENT: Negative for congestion.     Eyes: Negative for visual disturbance.   Respiratory: Negative for shortness of breath.    Cardiovascular: Negative for chest pain and palpitations.   Gastrointestinal: Positive for abdominal distention, abdominal pain, diarrhea and nausea. Negative for blood in stool.   Endocrine: Negative for cold intolerance and heat intolerance.   Genitourinary: Negative for hematuria.   Musculoskeletal: Negative for gait problem.   Skin: Negative for color change.   Allergic/Immunologic: Negative for immunocompromised state.   Neurological: Negative for weakness and light-headedness.   Hematological: Negative for adenopathy.   Psychiatric/Behavioral: Negative for sleep disturbance. The patient is not nervous/anxious.          ACTIVE PROBLEMS  Patient Active Problem List    Diagnosis    • Acute kidney injury ([facility]) [N17.9]    • Cytokine release syndrome, grade 2 [D89.832]    • Pneumonia due to COVID-19 virus [U07.1, J12.82]    • Polypharmacy [Z79.899]    • Atrial flutter ([facility]) [I48.92]    • Moderate malnutrition ([facility]) [E44.0]    • Leukocytes in urine [R82.998]    • Anemia in neoplastic disease [D63.0]    • Gastroesophageal reflux disease with esophagitis without hemorrhage [K21.00]    • Esophageal dysphagia [R13.19]    • Subacromial bursitis of left shoulder joint [M75.52]    • Uncontrolled type 2 diabetes mellitus with hyperglycemia ([facility]) [E11.65]    • Iron overload, transfusional [E83.111]    • Primary osteoarthritis of right knee [M17.11]    • Knee pain [M25.569]    • Chronic diastolic CHF (congestive heart failure) ([facility]) [I50.32]    • History of deep venous thrombosis (DVT) of distal vein of left lower extremity [Z86.718]    • Moderate episode of recurrent major depressive disorder ([facility]) [F33.1]    • Monoclonal gammopathy of unknown significance (MGUS) [D47.2]    • 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]    • Anemia requiring transfusions [D64.9]    • Right carpal tunnel syndrome [G56.01]    • Tendinitis of  right rotator cuff [M75.81]    • AC joint arthropathy [M19.019]    • Subacromial impingement of right shoulder [M75.41]    • Orthostatic hypotension [I95.1]    • Primary osteoarthritis of left knee [M17.12]    • Complex tear of medial meniscus of right knee as current injury [S83.231A]    • Insufficiency fracture of tibia [M84.469A]    • CKD stage 3 due to type 2 diabetes mellitus ([facility]) [E11.22, N18.30]    • Anxiety [F41.9]    • History of biliary T-tube placement [Z98.890]    • Acquired hypothyroidism [E03.9]    • Primary localized osteoarthrosis of left shoulder region [M19.012]    • Rotator cuff tendonitis [M75.80]    • Diabetic gastroparesis ([facility]) [E11.43, K31.84]    • Arthritis [M19.90]    • Chronic back pain [M54.9, G89.29]    • Chronic anemia [D64.9]    • Anxiety and depression [F41.9, F32.A]    • Type 2 diabetes mellitus with neurologic complication ([facility]) [E11.49]    • Brittle diabetes mellitus ([facility]) [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.9]    • Dizzy [R42]    • Mixed hyperlipidemia [E78.2]    • Essential hypertension [I10]    • Insomnia with sleep apnea [G47.00, G47.30]    • Obstructive sleep apnea syndrome [G47.33]    • Peripheral neuropathy [G62.9]    • Vitamin D deficiency [E55.9]    • Localized edema [R60.0]    • Type 2 diabetes mellitus with diabetic neuropathy ([facility]) [E11.40]          PAST MEDICAL HISTORY  Past Medical History:   Diagnosis Date   • Allergic rhinitis    • Anemia    • Anxiety    • Appetite absent    • Arthritis    • Asthma    • Back pain    • Bell's palsy    • Black tarry stools    • Blood in stool    • Chronic fatigue    • CKD (chronic kidney disease) stage 3, GFR 30-59 ml/min ([facility])    • Community acquired pneumonia of left lung 10/25/2018   • Cough    • Depression    • Diabetes mellitus ([facility])     LAST A1C 6   • Diabetic gastroparesis ([facility]) 2/19/2016   • Difficulty walking    • Excessive urination at night    • Frequent urination    • GERD (gastroesophageal reflux disease)    • GI bleed    • Gout    • H/O blood clots      LEFT LEG 7 OR 8 YEARS AGO   • Heat intolerance    • History of fall 10/2018   • History of prior pregnancies     x8, miscarriage 5   • History of transfusion 11/2018    due to anemia   • Hyperlipidemia    • Hypertension    • Hypothyroidism    • Myelodysplastic syndrome with isolated del(5q) chromosomal abnormality (HCC)    • Normal coronary arteries     by cath 2013   • Orthostatic hypotension    • AARON (obstructive sleep apnea)     DOESNT WEAR REGULARLY   • Pneumonia    • PONV (postoperative nausea and vomiting)    • Skin cancer    • Stroke (HCC)     Several mini-strokes   • TIA (transient ischemic attack)     LAST TIA JULY 2017   • Urination pain    • UTI (urinary tract infection)     Dec 2018 and Jan 2019         SURGICAL HISTORY  Past Surgical History:   Procedure Laterality Date   • BACK SURGERY      HARDWARE   • CHOLECYSTECTOMY      OPEN   • COLONOSCOPY  2011    due for repeat in 2021   • COLONOSCOPY N/A 10/28/2018    Procedure: COLONOSCOPY;  Surgeon: Emmanuel Rogers MD;  Location: [facility] OR;  Service: Gastroenterology   • ENDOSCOPY N/A 10/26/2018    Procedure: ESOPHAGOGASTRODUODENOSCOPY;  Surgeon: Emmanuel Rogers MD;  Location: [facility] OR;  Service: Gastroenterology   • ENDOSCOPY N/A 5/13/2021    Procedure: ESOPHAGOGASTRODUODENOSCOPY, biopsy, dilatation;  Surgeon: Emmanuel Rogers MD;  Location: [facility] OR;  Service: Gastroenterology;  Laterality: N/A;  Esophagitis; Dilation- no stricture; Biopsy- esophagus   • HYSTERECTOMY      PARTIAL    • KNEE SURGERY     • NECK SURGERY     • SPINE SURGERY     • TUMOR REMOVAL Left     Leg   • UPPER GASTROINTESTINAL ENDOSCOPY  2014    gastritis.  done by dr. hastings         FAMILY HISTORY  Family History   Problem Relation Age of Onset   • Lupus Mother    • Heart failure Mother 59   • Heart disease Other    • Hypertension Other    • Heart attack Father    • Breast cancer Neg Hx          SOCIAL HISTORY  Social History     Occupational  History     Employer: RETIRED   Tobacco Use   • Smoking status: Never Smoker   • Smokeless tobacco: Never Used   • Tobacco comment: CAFFEINE USE: NONE   Vaping Use   • Vaping Use: Never used   Substance and Sexual Activity   • Alcohol use: No   • Drug use: No   • Sexual activity: Defer     Comment: EXERCISE - RARELY         CURRENT MEDICATIONS    Current Outpatient Medications:   •  ACCU-CHEK FASTCLIX LANCETS misc, TEST 3-4 TIMES DAILY AS DIRECTED, Disp: 400 each, Rfl: 3  •  ACCU-CHEK SMARTVIEW test strip, TEST BLOOD SUGAR THREE TIMES DAILY OR AS DIRECTED, Disp: 300 each, Rfl: 3  •  acetaminophen (TYLENOL) 325 MG tablet, Take 2 tablets by mouth Every 6 (Six) Hours As Needed for Mild Pain . OTC product, Disp: 40 tablet, Rfl: 0  •  amiodarone (PACERONE) 200 MG tablet, Take 200 mg by mouth Daily., Disp: , Rfl:   •  apixaban (Eliquis) 5 MG tablet tablet, Take 1 tablet by mouth 2 (Two) Times a Day., Disp: 180 tablet, Rfl: 1  •  atorvastatin (LIPITOR) 10 MG tablet, TAKE ONE TABLET BY MOUTH AT BEDTIME, Disp: 90 tablet, Rfl: 1  •  benzonatate (Tessalon Perles) 100 MG capsule, Take 1 capsule by mouth 3 (Three) Times a Day As Needed for Cough., Disp: , Rfl:   •  bisacodyl (DULCOLAX) 10 MG suppository, Insert 1 suppository into the rectum Daily As Needed for Constipation (Use if bisacodyl oral is ineffective)., Disp: , Rfl:   •  Blood Glucose Monitoring Suppl (ACCU-CHEK KENNETH SMARTVIEW) w/Device kit, TEST blood sugar three times daily or as directed, Disp: 1 kit, Rfl: 0  •  desvenlafaxine (PRISTIQ) 50 MG 24 hr tablet, TAKE ONE TABLET BY MOUTH EVERY DAY, Disp: 90 tablet, Rfl: 1  •  Diclofenac Sodium (VOLTAREN) 1 % gel gel, Apply 4 g topically to the appropriate area as directed 4 (Four) Times a Day As Needed., Disp: , Rfl:   •  epoetin chu-epbx (Retacrit) 23706 UNIT/ML injection, Inject  under the skin into the appropriate area as directed., Disp: , Rfl:   •  epoetin chu-epbx (Retacrit) 20738 UNIT/ML injection, Inject 60,000  "Units under the skin into the appropriate area as directed. Inject twice on Fridays, Disp: , Rfl:   •  gabapentin (NEURONTIN) 100 MG capsule, Take 100 mg by mouth 3 (Three) Times a Day., Disp: , Rfl:   •  HYDROcodone-acetaminophen (NORCO) 5-325 MG per tablet, Take 1 tablet by mouth 2 (Two) Times a Day As Needed., Disp: , Rfl:   •  insulin aspart (novoLOG) 100 UNIT/ML injection, Inject 5 Units under the skin into the appropriate area as directed 3 (Three) Times a Day With Meals. (Patient taking differently: Inject 5 Units under the skin into the appropriate area as directed 3 (Three) Times a Day With Meals. Per sliding scale), Disp: , Rfl: 12  •  insulin detemir (LEVEMIR) 100 UNIT/ML injection, Inject 10 Units under the skin into the appropriate area as directed Every Night., Disp: , Rfl:   •  levothyroxine (SYNTHROID, LEVOTHROID) 88 MCG tablet, TAKE ONE TABLET BY MOUTH EVERY DAY, Disp: 90 tablet, Rfl: 1  •  melatonin 5 MG tablet tablet, Take 1 tablet by mouth At Night As Needed (Insomnia)., Disp: , Rfl:   •  metaxalone (SKELAXIN) 400 MG tablet, Take 1 tablet by mouth 3 (Three) Times a Day As Needed for Muscle Spasms., Disp: , Rfl:   •  midodrine (PROAMATINE) 2.5 MG tablet, Take 1 tablet by mouth 3 (Three) Times a Day., Disp: 90 tablet, Rfl: 6  •  Nebulizer device, 1 each Take As Directed., Disp: 1 each, Rfl: 0  •  Needle, Disp, (BD DISP NEEDLES) 30G X 1/2\" misc, To be used 3 times daily with Novolog Flexpen., Disp: 100 each, Rfl: 5  •  O2 (OXYGEN), Inhale 2 L/min Continuous. 1.5 L continuous with 2 L nightly, Disp: , Rfl:   •  omeprazole (priLOSEC) 40 MG capsule, Take 1 capsule by mouth 2 (two) times a day., Disp: 180 capsule, Rfl: 3  •  polyethylene glycol (MIRALAX) 17 g packet, Take 17 g by mouth Daily., Disp: , Rfl:   •  promethazine (PHENERGAN) 25 MG tablet, Take 25 mg by mouth Every 6 (Six) Hours As Needed for Nausea or Vomiting., Disp: , Rfl:   •  sennosides-docusate (senna-docusate sodium) 8.6-50 MG per " "tablet, Take 2 tablets by mouth Daily., Disp: 60 tablet, Rfl: 3  •  tamsulosin (FLOMAX) 0.4 MG capsule 24 hr capsule, Take 1 capsule by mouth Daily., Disp: 30 capsule, Rfl:   •  UltiCare Mini Pen Needles 31G X 6 MM misc, USE TO inject insulins FOUR TIMES DAILY AS DIRECTED, Disp: 400 each, Rfl: 3  •  Ventolin  (90 Base) MCG/ACT inhaler, INHALE TWO PUFFS BY MOUTH EVERY 4 HOURS AS NEEDED FOR WHEEZING, Disp: 18 g, Rfl: 3  •  fluconazole (Diflucan) 100 MG tablet, Take 1 tablet by mouth Daily for 21 days., Disp: 21 tablet, Rfl: 0  •  NovoLOG FlexPen 100 UNIT/ML solution pen-injector sc pen, , Disp: , Rfl:   •  Nutritional Supplements (Glucerna 1.0 Khai/CarbSteady) liquid, Take 240 mL by mouth Daily., Disp: 7200 mL, Rfl: 11  •  nystatin (MYCOSTATIN) 867017 UNIT/GM cream, , Disp: , Rfl:   No current facility-administered medications for this visit.    Facility-Administered Medications Ordered in Other Visits:   •  acetaminophen (TYLENOL) tablet 650 mg, 650 mg, Oral, Q6H PRN, Elieser Headley MD  •  diphenhydrAMINE (BENADRYL) capsule 25 mg, 25 mg, Oral, Once, Elieser Headley MD    ALLERGIES  Baclofen, Codeine, Lisinopril, Morphine, and Penicillins    VITALS  Vitals:    11/23/21 0951   BP: 110/76   BP Location: Left arm   Patient Position: Sitting   Cuff Size: Adult   Weight: 77.6 kg (171 lb)   Height: 157.5 cm (62\")       PHYSICAL EXAM  Debilities/Disabilities Identified: None  Emotional Behavior: Appropriate  Wt Readings from Last 3 Encounters:   11/23/21 77.6 kg (171 lb)   11/11/21 78.9 kg (174 lb)   10/18/21 78.9 kg (174 lb)     Ht Readings from Last 1 Encounters:   11/23/21 157.5 cm (62\")     Body mass index is 31.28 kg/m².  Physical Exam  Constitutional:       Appearance: She is well-developed. She is not diaphoretic.   Eyes:      General: No scleral icterus.     Conjunctiva/sclera: Conjunctivae normal.      Pupils: Pupils are equal, round, and reactive to light.   Neck:      Thyroid: No thyromegaly. "   Cardiovascular:      Rate and Rhythm: Normal rate and regular rhythm.      Heart sounds: Normal heart sounds. No murmur heard.  No gallop.    Pulmonary:      Effort: Pulmonary effort is normal.      Breath sounds: Normal breath sounds. No wheezing or rales.   Abdominal:      General: Bowel sounds are normal. There is no distension or abdominal bruit.      Palpations: Abdomen is soft. There is no shifting dullness, fluid wave or mass.      Tenderness: There is abdominal tenderness in the right upper quadrant, epigastric area and left upper quadrant. There is no guarding. Negative signs include Maza's sign.      Hernia: There is no hernia in the ventral area.   Musculoskeletal:         General: Normal range of motion.      Cervical back: Normal range of motion and neck supple.   Lymphadenopathy:      Cervical: No cervical adenopathy.   Skin:     General: Skin is warm and dry.      Findings: No erythema or rash.   Neurological:      Mental Status: She is alert and oriented to person, place, and time.         CLINICAL DATA REVIEWED   reviewed previous lab results and integrated with today's visit, reviewed notes from other physicians and/or last GI encounter, reviewed previous endoscopy results and available photos, reviewed surgical pathology results from previous biopsies    ASSESSMENT  Diagnoses and all orders for this visit:    Bilious vomiting with nausea  -     NM Gastric Emptying; Future    CKD stage 3 due to type 2 diabetes mellitus (HCC)    Uncontrolled type 2 diabetes mellitus with hyperglycemia (HCC)    Esophageal dysphagia    Odynophagia    Other orders  -     epoetin chu-epbx (Retacrit) 53325 UNIT/ML injection; Inject  under the skin into the appropriate area as directed.  -     nystatin (MYCOSTATIN) 859226 UNIT/GM cream  -     NovoLOG FlexPen 100 UNIT/ML solution pen-injector sc pen  -     epoetin chu-epbx (Retacrit) 17760 UNIT/ML injection; Inject 60,000 Units under the skin into the appropriate  area as directed. Inject twice on Fridays  -     fluconazole (Diflucan) 100 MG tablet; Take 1 tablet by mouth Daily for 21 days.  -     Nutritional Supplements (Glucerna 1.0 Khai/CarbSteady) liquid; Take 240 mL by mouth Daily.          PLAN  Return in about 3 months (around 2/23/2022).    I have discussed the above plan with the patient.  They verbalize understanding and are in agreement with the plan.  They have been advised to contact the office for any questions, concerns, or changes related to their health.

## 2021-12-21 NOTE — NURSING NOTE
Nurse s/w pt's nurse at [facility] ([phone]). Pt's hgb today is 6.8. Per Dr. Headley, 2 unit blood transfusion is needed. The pt was scheduled at 9am on Wed,, December 22nd at [facility]. Eri was called back and given the above appt information and v/u.

## 2021-12-29 NOTE — TELEPHONE ENCOUNTER
MARYBETH WITH [facility], CALLING REGARDING THE MEDICINE REQUEST FOR REVLIMID, THEY FAXED A REFILL REQUEST ON THE 12TH AND WANTS TO MAKE SURE IT WAS RECEIVED AND BEING WORKED ON, PLEASE ADVISE?    CALL BACK #[phone]  
SENT TO [facility]   
English

## 2022-01-01 ENCOUNTER — APPOINTMENT (OUTPATIENT)
Dept: CT IMAGING | Facility: HOSPITAL | Age: 82
End: 2022-01-01

## 2022-01-01 ENCOUNTER — LAB (OUTPATIENT)
Dept: LAB | Facility: HOSPITAL | Age: 82
End: 2022-01-01

## 2022-01-01 ENCOUNTER — HOSPITAL ENCOUNTER (OUTPATIENT)
Dept: NUCLEAR MEDICINE | Facility: HOSPITAL | Age: 82
Discharge: HOME | End: 2022-01-27

## 2022-01-01 ENCOUNTER — HOSPITAL ENCOUNTER (OUTPATIENT)
Dept: INFUSION THERAPY | Facility: HOSPITAL | Age: 82
Discharge: HOME | End: 2022-01-20
Admitting: INTERNAL MEDICINE

## 2022-01-01 ENCOUNTER — APPOINTMENT (OUTPATIENT)
Dept: ONCOLOGY | Facility: HOSPITAL | Age: 82
End: 2022-01-01

## 2022-01-01 ENCOUNTER — APPOINTMENT (OUTPATIENT)
Dept: LAB | Facility: HOSPITAL | Age: 82
End: 2022-01-01

## 2022-01-01 ENCOUNTER — HOSPITAL ENCOUNTER (INPATIENT)
Facility: HOSPITAL | Age: 82
LOS: 5 days | Discharge: NURSING, REHAB OR LONG-TERM CARE | End: 2022-01-10
Attending: EMERGENCY MEDICINE | Admitting: INTERNAL MEDICINE

## 2022-01-01 ENCOUNTER — HOSPITAL ENCOUNTER (OUTPATIENT)
Facility: HOSPITAL | Age: 82
Setting detail: OBSERVATION
Discharge: NURSING, REHAB OR LONG-TERM CARE | End: 2022-02-03
Attending: EMERGENCY MEDICINE | Admitting: INTERNAL MEDICINE

## 2022-01-01 ENCOUNTER — APPOINTMENT (OUTPATIENT)
Dept: GENERAL RADIOLOGY | Facility: HOSPITAL | Age: 82
End: 2022-01-01

## 2022-01-01 ENCOUNTER — TELEPHONE (OUTPATIENT)
Dept: ONCOLOGY | Facility: CLINIC | Age: 82
End: 2022-01-01

## 2022-01-01 ENCOUNTER — OFFICE VISIT (OUTPATIENT)
Dept: ONCOLOGY | Facility: CLINIC | Age: 82
End: 2022-01-01

## 2022-01-01 VITALS
RESPIRATION RATE: 16 BRPM | DIASTOLIC BLOOD PRESSURE: 70 MMHG | OXYGEN SATURATION: 99 % | BODY MASS INDEX: 30.25 KG/M2 | HEART RATE: 65 BPM | SYSTOLIC BLOOD PRESSURE: 143 MMHG | TEMPERATURE: 97.7 F | WEIGHT: 165.4 LBS

## 2022-01-01 VITALS
RESPIRATION RATE: 18 BRPM | BODY MASS INDEX: 28.76 KG/M2 | TEMPERATURE: 98.1 F | WEIGHT: 156.31 LBS | DIASTOLIC BLOOD PRESSURE: 58 MMHG | SYSTOLIC BLOOD PRESSURE: 139 MMHG | OXYGEN SATURATION: 98 % | HEART RATE: 75 BPM | HEIGHT: 62 IN

## 2022-01-01 VITALS
RESPIRATION RATE: 20 BRPM | OXYGEN SATURATION: 95 % | TEMPERATURE: 97.7 F | SYSTOLIC BLOOD PRESSURE: 119 MMHG | WEIGHT: 163.4 LBS | DIASTOLIC BLOOD PRESSURE: 68 MMHG | HEART RATE: 84 BPM | BODY MASS INDEX: 30.07 KG/M2 | HEIGHT: 62 IN

## 2022-01-01 VITALS
WEIGHT: 161 LBS | HEART RATE: 80 BPM | OXYGEN SATURATION: 100 % | DIASTOLIC BLOOD PRESSURE: 64 MMHG | BODY MASS INDEX: 29.45 KG/M2 | TEMPERATURE: 97.3 F | SYSTOLIC BLOOD PRESSURE: 116 MMHG | RESPIRATION RATE: 18 BRPM

## 2022-01-01 DIAGNOSIS — D46.9 ANEMIA ASSOC WITH MYELODYSPLASTIC SYNDROME TREATED WITH ERYTHROPOIETIN: ICD-10-CM

## 2022-01-01 DIAGNOSIS — D47.2 MONOCLONAL GAMMOPATHY OF UNKNOWN SIGNIFICANCE (MGUS): ICD-10-CM

## 2022-01-01 DIAGNOSIS — R11.2 NAUSEA AND VOMITING, INTRACTABILITY OF VOMITING NOT SPECIFIED, UNSPECIFIED VOMITING TYPE: ICD-10-CM

## 2022-01-01 DIAGNOSIS — D46.C MYELODYSPLASTIC SYNDROME WITH 5Q DELETION: ICD-10-CM

## 2022-01-01 DIAGNOSIS — D64.9 CHRONIC ANEMIA: ICD-10-CM

## 2022-01-01 DIAGNOSIS — N39.0 URINARY TRACT INFECTION IN ELDERLY PATIENT: ICD-10-CM

## 2022-01-01 DIAGNOSIS — R13.10 DYSPHAGIA, UNSPECIFIED TYPE: ICD-10-CM

## 2022-01-01 DIAGNOSIS — D64.9 ANEMIA, UNSPECIFIED TYPE: Primary | ICD-10-CM

## 2022-01-01 DIAGNOSIS — M25.561 ACUTE PAIN OF BOTH KNEES: ICD-10-CM

## 2022-01-01 DIAGNOSIS — Z51.5 COMFORT MEASURES ONLY STATUS: ICD-10-CM

## 2022-01-01 DIAGNOSIS — D64.9 ANEMIA REQUIRING TRANSFUSIONS: ICD-10-CM

## 2022-01-01 DIAGNOSIS — M25.562 ACUTE PAIN OF BOTH KNEES: ICD-10-CM

## 2022-01-01 DIAGNOSIS — R62.7 FAILURE TO THRIVE IN ADULT: ICD-10-CM

## 2022-01-01 DIAGNOSIS — R53.1 WEAKNESS: ICD-10-CM

## 2022-01-01 DIAGNOSIS — D64.9 CHRONIC ANEMIA: Primary | ICD-10-CM

## 2022-01-01 DIAGNOSIS — J18.9 PNEUMONIA OF RIGHT LOWER LOBE DUE TO INFECTIOUS ORGANISM: Primary | ICD-10-CM

## 2022-01-01 DIAGNOSIS — R11.14 BILIOUS VOMITING WITH NAUSEA: ICD-10-CM

## 2022-01-01 DIAGNOSIS — D63.0 ANEMIA ASSOC WITH MYELODYSPLASTIC SYNDROME TREATED WITH ERYTHROPOIETIN: ICD-10-CM

## 2022-01-01 DIAGNOSIS — D63.0 ANEMIA IN NEOPLASTIC DISEASE: ICD-10-CM

## 2022-01-01 DIAGNOSIS — R09.02 HYPOXIA: ICD-10-CM

## 2022-01-01 DIAGNOSIS — D46.C MYELODYSPLASTIC SYNDROME WITH 5Q DELETION: Primary | ICD-10-CM

## 2022-01-01 LAB
ABO GROUP BLD: NORMAL
ACANTHOCYTES BLD QL SMEAR: NORMAL
ALBUMIN SERPL-MCNC: 2.5 G/DL (ref 3.5–5.2)
ALBUMIN SERPL-MCNC: 2.6 G/DL (ref 3.5–5.2)
ALBUMIN SERPL-MCNC: 2.8 G/DL (ref 3.5–5.2)
ALBUMIN/GLOB SERPL: 0.8 G/DL
ALBUMIN/GLOB SERPL: 0.8 G/DL
ALBUMIN/GLOB SERPL: 0.9 G/DL
ALP SERPL-CCNC: 103 U/L (ref 39–117)
ALP SERPL-CCNC: 83 U/L (ref 39–117)
ALP SERPL-CCNC: 92 U/L (ref 39–117)
ALT SERPL W P-5'-P-CCNC: 18 U/L (ref 1–33)
ALT SERPL W P-5'-P-CCNC: 22 U/L (ref 1–33)
ALT SERPL W P-5'-P-CCNC: 8 U/L (ref 1–33)
ANION GAP SERPL CALCULATED.3IONS-SCNC: 10.4 MMOL/L (ref 5–15)
ANION GAP SERPL CALCULATED.3IONS-SCNC: 12 MMOL/L (ref 5–15)
ANION GAP SERPL CALCULATED.3IONS-SCNC: 13.4 MMOL/L (ref 5–15)
ANION GAP SERPL CALCULATED.3IONS-SCNC: 14.3 MMOL/L (ref 5–15)
ANION GAP SERPL CALCULATED.3IONS-SCNC: 4.8 MMOL/L (ref 5–15)
ANION GAP SERPL CALCULATED.3IONS-SCNC: 7.4 MMOL/L (ref 5–15)
ANION GAP SERPL CALCULATED.3IONS-SCNC: 8.7 MMOL/L (ref 5–15)
ANION GAP SERPL CALCULATED.3IONS-SCNC: 8.7 MMOL/L (ref 5–15)
ANISOCYTOSIS BLD QL: NORMAL
AST SERPL-CCNC: 12 U/L (ref 1–32)
AST SERPL-CCNC: 17 U/L (ref 1–32)
AST SERPL-CCNC: 24 U/L (ref 1–32)
B PARAPERT DNA SPEC QL NAA+PROBE: NOT DETECTED
B PERT DNA SPEC QL NAA+PROBE: NOT DETECTED
BACTERIA BLD CULT: ABNORMAL
BACTERIA SPEC AEROBE CULT: ABNORMAL
BACTERIA SPEC AEROBE CULT: NORMAL
BACTERIA UR QL AUTO: ABNORMAL /HPF
BACTERIA UR QL AUTO: NORMAL /HPF
BASOPHILS # BLD AUTO: 0 10*3/MM3 (ref 0–0.2)
BASOPHILS # BLD AUTO: 0.01 10*3/MM3 (ref 0–0.2)
BASOPHILS # BLD AUTO: 0.01 10*3/MM3 (ref 0–0.2)
BASOPHILS # BLD AUTO: 0.02 10*3/MM3 (ref 0–0.2)
BASOPHILS # BLD AUTO: 0.02 10*3/MM3 (ref 0–0.2)
BASOPHILS # BLD AUTO: 0.03 10*3/MM3 (ref 0–0.2)
BASOPHILS # BLD AUTO: 0.03 10*3/MM3 (ref 0–0.2)
BASOPHILS # BLD AUTO: 0.04 10*3/MM3 (ref 0–0.2)
BASOPHILS # BLD AUTO: 0.04 10*3/MM3 (ref 0–0.2)
BASOPHILS # BLD AUTO: 0.05 10*3/MM3 (ref 0–0.2)
BASOPHILS NFR BLD AUTO: 0 % (ref 0–1.5)
BASOPHILS NFR BLD AUTO: 0.6 % (ref 0–1.5)
BASOPHILS NFR BLD AUTO: 0.7 % (ref 0–1.5)
BASOPHILS NFR BLD AUTO: 0.8 % (ref 0–1.5)
BASOPHILS NFR BLD AUTO: 1.1 % (ref 0–1.5)
BASOPHILS NFR BLD AUTO: 1.2 % (ref 0–1.5)
BASOPHILS NFR BLD AUTO: 1.3 % (ref 0–1.5)
BASOPHILS NFR BLD AUTO: 1.6 % (ref 0–1.5)
BASOPHILS NFR BLD AUTO: 2.6 % (ref 0–1.5)
BASOPHILS NFR BLD AUTO: 3.2 % (ref 0–1.5)
BH BB BLOOD EXPIRATION DATE: NORMAL
BH BB BLOOD TYPE BARCODE: 7300
BH BB DISPENSE STATUS: NORMAL
BH BB PRODUCT CODE: NORMAL
BH BB UNIT NUMBER: NORMAL
BILIRUB SERPL-MCNC: 0.3 MG/DL (ref 0–1.2)
BILIRUB SERPL-MCNC: 0.5 MG/DL (ref 0–1.2)
BILIRUB SERPL-MCNC: 0.8 MG/DL (ref 0–1.2)
BILIRUB UR QL STRIP: ABNORMAL
BILIRUB UR QL STRIP: NEGATIVE
BLD GP AB SCN SERPL QL: NEGATIVE
BOTTLE TYPE: ABNORMAL
BUN SERPL-MCNC: 10 MG/DL (ref 8–23)
BUN SERPL-MCNC: 10 MG/DL (ref 8–23)
BUN SERPL-MCNC: 11 MG/DL (ref 8–23)
BUN SERPL-MCNC: 13 MG/DL (ref 8–23)
BUN SERPL-MCNC: 15 MG/DL (ref 8–23)
BUN SERPL-MCNC: 16 MG/DL (ref 8–23)
BUN/CREAT SERPL: 10.6 (ref 7–25)
BUN/CREAT SERPL: 11 (ref 7–25)
BUN/CREAT SERPL: 11.3 (ref 7–25)
BUN/CREAT SERPL: 7.3 (ref 7–25)
BUN/CREAT SERPL: 7.9 (ref 7–25)
BUN/CREAT SERPL: 8.3 (ref 7–25)
BUN/CREAT SERPL: 9 (ref 7–25)
BUN/CREAT SERPL: 9.6 (ref 7–25)
C DIFF GDH STL QL: NEGATIVE
C PNEUM DNA NPH QL NAA+NON-PROBE: NOT DETECTED
CALCIUM SPEC-SCNC: 6.9 MG/DL (ref 8.6–10.5)
CALCIUM SPEC-SCNC: 7.3 MG/DL (ref 8.6–10.5)
CALCIUM SPEC-SCNC: 7.5 MG/DL (ref 8.6–10.5)
CALCIUM SPEC-SCNC: 7.7 MG/DL (ref 8.6–10.5)
CALCIUM SPEC-SCNC: 7.7 MG/DL (ref 8.6–10.5)
CALCIUM SPEC-SCNC: 7.8 MG/DL (ref 8.6–10.5)
CALCIUM SPEC-SCNC: 7.9 MG/DL (ref 8.6–10.5)
CALCIUM SPEC-SCNC: 8.2 MG/DL (ref 8.6–10.5)
CHLORIDE SERPL-SCNC: 100 MMOL/L (ref 98–107)
CHLORIDE SERPL-SCNC: 100 MMOL/L (ref 98–107)
CHLORIDE SERPL-SCNC: 101 MMOL/L (ref 98–107)
CHLORIDE SERPL-SCNC: 101 MMOL/L (ref 98–107)
CHLORIDE SERPL-SCNC: 104 MMOL/L (ref 98–107)
CHLORIDE SERPL-SCNC: 104 MMOL/L (ref 98–107)
CHLORIDE SERPL-SCNC: 106 MMOL/L (ref 98–107)
CHLORIDE SERPL-SCNC: 107 MMOL/L (ref 98–107)
CLARITY UR: ABNORMAL
CLARITY UR: CLEAR
CO2 SERPL-SCNC: 22.3 MMOL/L (ref 22–29)
CO2 SERPL-SCNC: 23.6 MMOL/L (ref 22–29)
CO2 SERPL-SCNC: 24.2 MMOL/L (ref 22–29)
CO2 SERPL-SCNC: 24.3 MMOL/L (ref 22–29)
CO2 SERPL-SCNC: 24.6 MMOL/L (ref 22–29)
CO2 SERPL-SCNC: 25.6 MMOL/L (ref 22–29)
CO2 SERPL-SCNC: 25.7 MMOL/L (ref 22–29)
CO2 SERPL-SCNC: 26 MMOL/L (ref 22–29)
COLOR UR: ABNORMAL
COLOR UR: YELLOW
CREAT SERPL-MCNC: 1.23 MG/DL (ref 0.57–1)
CREAT SERPL-MCNC: 1.27 MG/DL (ref 0.57–1)
CREAT SERPL-MCNC: 1.33 MG/DL (ref 0.57–1)
CREAT SERPL-MCNC: 1.35 MG/DL (ref 0.57–1)
CREAT SERPL-MCNC: 1.36 MG/DL (ref 0.57–1)
CREAT SERPL-MCNC: 1.37 MG/DL (ref 0.57–1)
CREAT SERPL-MCNC: 1.42 MG/DL (ref 0.57–1)
CREAT SERPL-MCNC: 1.44 MG/DL (ref 0.57–1)
CROSSMATCH INTERPRETATION: NORMAL
CRP SERPL-MCNC: 16.26 MG/DL (ref 0–0.5)
D-LACTATE SERPL-SCNC: 1 MMOL/L (ref 0.5–2)
DEPRECATED RDW RBC AUTO: 48.3 FL (ref 37–54)
DEPRECATED RDW RBC AUTO: 49.1 FL (ref 37–54)
DEPRECATED RDW RBC AUTO: 49.1 FL (ref 37–54)
DEPRECATED RDW RBC AUTO: 51.2 FL (ref 37–54)
DEPRECATED RDW RBC AUTO: 51.3 FL (ref 37–54)
DEPRECATED RDW RBC AUTO: 52.1 FL (ref 37–54)
DEPRECATED RDW RBC AUTO: 57.8 FL (ref 37–54)
DEPRECATED RDW RBC AUTO: 58.1 FL (ref 37–54)
DEPRECATED RDW RBC AUTO: 59.7 FL (ref 37–54)
DEPRECATED RDW RBC AUTO: 60.6 FL (ref 37–54)
DEPRECATED RDW RBC AUTO: 62.2 FL (ref 37–54)
DEPRECATED RDW RBC AUTO: 65.1 FL (ref 37–54)
EOSINOPHIL # BLD AUTO: 0.03 10*3/MM3 (ref 0–0.4)
EOSINOPHIL # BLD AUTO: 0.03 10*3/MM3 (ref 0–0.4)
EOSINOPHIL # BLD AUTO: 0.05 10*3/MM3 (ref 0–0.4)
EOSINOPHIL # BLD AUTO: 0.05 10*3/MM3 (ref 0–0.4)
EOSINOPHIL # BLD AUTO: 0.06 10*3/MM3 (ref 0–0.4)
EOSINOPHIL # BLD AUTO: 0.1 10*3/MM3 (ref 0–0.4)
EOSINOPHIL # BLD AUTO: 0.11 10*3/MM3 (ref 0–0.4)
EOSINOPHIL # BLD AUTO: 0.12 10*3/MM3 (ref 0–0.4)
EOSINOPHIL # BLD AUTO: 0.13 10*3/MM3 (ref 0–0.4)
EOSINOPHIL # BLD AUTO: 0.17 10*3/MM3 (ref 0–0.4)
EOSINOPHIL # BLD MANUAL: 0.04 10*3/MM3 (ref 0–0.4)
EOSINOPHIL NFR BLD AUTO: 11.2 % (ref 0.3–6.2)
EOSINOPHIL NFR BLD AUTO: 2.1 % (ref 0.3–6.2)
EOSINOPHIL NFR BLD AUTO: 2.3 % (ref 0.3–6.2)
EOSINOPHIL NFR BLD AUTO: 2.6 % (ref 0.3–6.2)
EOSINOPHIL NFR BLD AUTO: 2.8 % (ref 0.3–6.2)
EOSINOPHIL NFR BLD AUTO: 3.1 % (ref 0.3–6.2)
EOSINOPHIL NFR BLD AUTO: 3.5 % (ref 0.3–6.2)
EOSINOPHIL NFR BLD AUTO: 3.5 % (ref 0.3–6.2)
EOSINOPHIL NFR BLD AUTO: 5.4 % (ref 0.3–6.2)
EOSINOPHIL NFR BLD AUTO: 7.6 % (ref 0.3–6.2)
EOSINOPHIL NFR BLD MANUAL: 4 % (ref 0.3–6.2)
ERYTHROCYTE [DISTWIDTH] IN BLOOD BY AUTOMATED COUNT: 15.3 % (ref 12.3–15.4)
ERYTHROCYTE [DISTWIDTH] IN BLOOD BY AUTOMATED COUNT: 15.7 % (ref 12.3–15.4)
ERYTHROCYTE [DISTWIDTH] IN BLOOD BY AUTOMATED COUNT: 15.8 % (ref 12.3–15.4)
ERYTHROCYTE [DISTWIDTH] IN BLOOD BY AUTOMATED COUNT: 15.9 % (ref 12.3–15.4)
ERYTHROCYTE [DISTWIDTH] IN BLOOD BY AUTOMATED COUNT: 15.9 % (ref 12.3–15.4)
ERYTHROCYTE [DISTWIDTH] IN BLOOD BY AUTOMATED COUNT: 16.6 % (ref 12.3–15.4)
ERYTHROCYTE [DISTWIDTH] IN BLOOD BY AUTOMATED COUNT: 17.7 % (ref 12.3–15.4)
ERYTHROCYTE [DISTWIDTH] IN BLOOD BY AUTOMATED COUNT: 18 % (ref 12.3–15.4)
ERYTHROCYTE [DISTWIDTH] IN BLOOD BY AUTOMATED COUNT: 18.4 % (ref 12.3–15.4)
ERYTHROCYTE [DISTWIDTH] IN BLOOD BY AUTOMATED COUNT: 18.8 % (ref 12.3–15.4)
ERYTHROCYTE [DISTWIDTH] IN BLOOD BY AUTOMATED COUNT: 19.8 % (ref 12.3–15.4)
ERYTHROCYTE [DISTWIDTH] IN BLOOD BY AUTOMATED COUNT: 20.4 % (ref 12.3–15.4)
ERYTHROCYTE [SEDIMENTATION RATE] IN BLOOD: 17 MM/HR (ref 0–30)
FLUAV RNA RESP QL NAA+PROBE: NOT DETECTED
FLUAV SUBTYP SPEC NAA+PROBE: NOT DETECTED
FLUBV RNA ISLT QL NAA+PROBE: NOT DETECTED
FLUBV RNA RESP QL NAA+PROBE: NOT DETECTED
FOLATE SERPL-MCNC: 7.97 NG/ML (ref 4.78–24.2)
GFR SERPL CREATININE-BSD FRML MDRD: 35 ML/MIN/1.73
GFR SERPL CREATININE-BSD FRML MDRD: 36 ML/MIN/1.73
GFR SERPL CREATININE-BSD FRML MDRD: 37 ML/MIN/1.73
GFR SERPL CREATININE-BSD FRML MDRD: 38 ML/MIN/1.73
GFR SERPL CREATININE-BSD FRML MDRD: 41 ML/MIN/1.73
GFR SERPL CREATININE-BSD FRML MDRD: 42 ML/MIN/1.73
GLOBULIN UR ELPH-MCNC: 2.8 GM/DL
GLOBULIN UR ELPH-MCNC: 3.3 GM/DL
GLOBULIN UR ELPH-MCNC: 3.3 GM/DL
GLUCOSE BLDC GLUCOMTR-MCNC: 100 MG/DL (ref 70–130)
GLUCOSE BLDC GLUCOMTR-MCNC: 105 MG/DL (ref 70–130)
GLUCOSE BLDC GLUCOMTR-MCNC: 106 MG/DL (ref 70–130)
GLUCOSE BLDC GLUCOMTR-MCNC: 107 MG/DL (ref 70–130)
GLUCOSE BLDC GLUCOMTR-MCNC: 112 MG/DL (ref 70–130)
GLUCOSE BLDC GLUCOMTR-MCNC: 112 MG/DL (ref 70–130)
GLUCOSE BLDC GLUCOMTR-MCNC: 113 MG/DL (ref 70–130)
GLUCOSE BLDC GLUCOMTR-MCNC: 113 MG/DL (ref 70–130)
GLUCOSE BLDC GLUCOMTR-MCNC: 122 MG/DL (ref 70–130)
GLUCOSE BLDC GLUCOMTR-MCNC: 126 MG/DL (ref 70–130)
GLUCOSE BLDC GLUCOMTR-MCNC: 131 MG/DL (ref 70–130)
GLUCOSE BLDC GLUCOMTR-MCNC: 133 MG/DL (ref 70–130)
GLUCOSE BLDC GLUCOMTR-MCNC: 135 MG/DL (ref 70–130)
GLUCOSE BLDC GLUCOMTR-MCNC: 138 MG/DL (ref 70–130)
GLUCOSE BLDC GLUCOMTR-MCNC: 142 MG/DL (ref 70–130)
GLUCOSE BLDC GLUCOMTR-MCNC: 146 MG/DL (ref 70–130)
GLUCOSE BLDC GLUCOMTR-MCNC: 149 MG/DL (ref 70–130)
GLUCOSE BLDC GLUCOMTR-MCNC: 149 MG/DL (ref 70–130)
GLUCOSE BLDC GLUCOMTR-MCNC: 154 MG/DL (ref 70–130)
GLUCOSE BLDC GLUCOMTR-MCNC: 158 MG/DL (ref 70–130)
GLUCOSE BLDC GLUCOMTR-MCNC: 162 MG/DL (ref 70–130)
GLUCOSE BLDC GLUCOMTR-MCNC: 163 MG/DL (ref 70–130)
GLUCOSE BLDC GLUCOMTR-MCNC: 167 MG/DL (ref 70–130)
GLUCOSE BLDC GLUCOMTR-MCNC: 170 MG/DL (ref 70–130)
GLUCOSE BLDC GLUCOMTR-MCNC: 180 MG/DL (ref 70–130)
GLUCOSE BLDC GLUCOMTR-MCNC: 182 MG/DL (ref 70–130)
GLUCOSE BLDC GLUCOMTR-MCNC: 200 MG/DL (ref 70–130)
GLUCOSE BLDC GLUCOMTR-MCNC: 204 MG/DL (ref 70–130)
GLUCOSE BLDC GLUCOMTR-MCNC: 209 MG/DL (ref 70–130)
GLUCOSE BLDC GLUCOMTR-MCNC: 211 MG/DL (ref 70–130)
GLUCOSE BLDC GLUCOMTR-MCNC: 253 MG/DL (ref 70–130)
GLUCOSE BLDC GLUCOMTR-MCNC: 67 MG/DL (ref 70–130)
GLUCOSE BLDC GLUCOMTR-MCNC: 94 MG/DL (ref 70–130)
GLUCOSE BLDC GLUCOMTR-MCNC: 98 MG/DL (ref 70–130)
GLUCOSE BLDC GLUCOMTR-MCNC: 99 MG/DL (ref 70–130)
GLUCOSE SERPL-MCNC: 101 MG/DL (ref 65–99)
GLUCOSE SERPL-MCNC: 114 MG/DL (ref 65–99)
GLUCOSE SERPL-MCNC: 148 MG/DL (ref 65–99)
GLUCOSE SERPL-MCNC: 171 MG/DL (ref 65–99)
GLUCOSE SERPL-MCNC: 44 MG/DL (ref 65–99)
GLUCOSE SERPL-MCNC: 68 MG/DL (ref 65–99)
GLUCOSE SERPL-MCNC: 84 MG/DL (ref 65–99)
GLUCOSE SERPL-MCNC: 97 MG/DL (ref 65–99)
GLUCOSE UR STRIP-MCNC: NEGATIVE MG/DL
GLUCOSE UR STRIP-MCNC: NEGATIVE MG/DL
GRAM STN SPEC: ABNORMAL
HADV DNA SPEC NAA+PROBE: NOT DETECTED
HBA1C MFR BLD: 5.9 % (ref 4.8–5.6)
HCOV 229E RNA SPEC QL NAA+PROBE: NOT DETECTED
HCOV HKU1 RNA SPEC QL NAA+PROBE: NOT DETECTED
HCOV NL63 RNA SPEC QL NAA+PROBE: NOT DETECTED
HCOV OC43 RNA SPEC QL NAA+PROBE: NOT DETECTED
HCT VFR BLD AUTO: 20.7 % (ref 34–46.6)
HCT VFR BLD AUTO: 23.5 % (ref 34–46.6)
HCT VFR BLD AUTO: 23.8 % (ref 34–46.6)
HCT VFR BLD AUTO: 24.1 % (ref 34–46.6)
HCT VFR BLD AUTO: 24.6 % (ref 34–46.6)
HCT VFR BLD AUTO: 25.3 % (ref 34–46.6)
HCT VFR BLD AUTO: 26.1 % (ref 34–46.6)
HCT VFR BLD AUTO: 27.9 % (ref 34–46.6)
HCT VFR BLD AUTO: 29 % (ref 34–46.6)
HCT VFR BLD AUTO: 29.8 % (ref 34–46.6)
HCT VFR BLD AUTO: 30.4 % (ref 34–46.6)
HCT VFR BLD AUTO: 30.8 % (ref 34–46.6)
HCT VFR BLD AUTO: 35 % (ref 34–46.6)
HGB BLD-MCNC: 11.2 G/DL (ref 12–15.9)
HGB BLD-MCNC: 6.3 G/DL (ref 12–15.9)
HGB BLD-MCNC: 7.2 G/DL (ref 12–15.9)
HGB BLD-MCNC: 7.3 G/DL (ref 12–15.9)
HGB BLD-MCNC: 7.3 G/DL (ref 12–15.9)
HGB BLD-MCNC: 7.6 G/DL (ref 12–15.9)
HGB BLD-MCNC: 7.8 G/DL (ref 12–15.9)
HGB BLD-MCNC: 8.1 G/DL (ref 12–15.9)
HGB BLD-MCNC: 9 G/DL (ref 12–15.9)
HGB BLD-MCNC: 9.1 G/DL (ref 12–15.9)
HGB BLD-MCNC: 9.1 G/DL (ref 12–15.9)
HGB BLD-MCNC: 9.3 G/DL (ref 12–15.9)
HGB BLD-MCNC: 9.4 G/DL (ref 12–15.9)
HGB UR QL STRIP.AUTO: ABNORMAL
HGB UR QL STRIP.AUTO: NEGATIVE
HMPV RNA NPH QL NAA+NON-PROBE: NOT DETECTED
HPIV1 RNA ISLT QL NAA+PROBE: NOT DETECTED
HPIV2 RNA SPEC QL NAA+PROBE: NOT DETECTED
HPIV3 RNA NPH QL NAA+PROBE: NOT DETECTED
HPIV4 P GENE NPH QL NAA+PROBE: NOT DETECTED
HYALINE CASTS UR QL AUTO: ABNORMAL /LPF
HYALINE CASTS UR QL AUTO: NORMAL /LPF
HYPOCHROMIA BLD QL: NORMAL
IMM GRANULOCYTES # BLD AUTO: 0 10*3/MM3 (ref 0–0.05)
IMM GRANULOCYTES # BLD AUTO: 0 10*3/MM3 (ref 0–0.05)
IMM GRANULOCYTES # BLD AUTO: 0.01 10*3/MM3 (ref 0–0.05)
IMM GRANULOCYTES # BLD AUTO: 0.02 10*3/MM3 (ref 0–0.05)
IMM GRANULOCYTES # BLD AUTO: 0.06 10*3/MM3 (ref 0–0.05)
IMM GRANULOCYTES # BLD AUTO: 0.06 10*3/MM3 (ref 0–0.05)
IMM GRANULOCYTES NFR BLD AUTO: 0 % (ref 0–0.5)
IMM GRANULOCYTES NFR BLD AUTO: 0 % (ref 0–0.5)
IMM GRANULOCYTES NFR BLD AUTO: 0.6 % (ref 0–0.5)
IMM GRANULOCYTES NFR BLD AUTO: 0.8 % (ref 0–0.5)
IMM GRANULOCYTES NFR BLD AUTO: 1.1 % (ref 0–0.5)
IMM GRANULOCYTES NFR BLD AUTO: 1.2 % (ref 0–0.5)
IMM GRANULOCYTES NFR BLD AUTO: 1.3 % (ref 0–0.5)
IMM GRANULOCYTES NFR BLD AUTO: 1.3 % (ref 0–0.5)
IMM GRANULOCYTES NFR BLD AUTO: 1.4 % (ref 0–0.5)
IMM GRANULOCYTES NFR BLD AUTO: 3.8 % (ref 0–0.5)
ISOLATED FROM: ABNORMAL
KETONES UR QL STRIP: ABNORMAL
KETONES UR QL STRIP: NEGATIVE
L PNEUMO1 AG UR QL IA: NEGATIVE
LEUKOCYTE ESTERASE UR QL STRIP.AUTO: ABNORMAL
LEUKOCYTE ESTERASE UR QL STRIP.AUTO: NEGATIVE
LIPASE SERPL-CCNC: 11 U/L (ref 13–60)
LIPASE SERPL-CCNC: 9 U/L (ref 13–60)
LYMPHOCYTES # BLD AUTO: 0.46 10*3/MM3 (ref 0.7–3.1)
LYMPHOCYTES # BLD AUTO: 0.51 10*3/MM3 (ref 0.7–3.1)
LYMPHOCYTES # BLD AUTO: 0.53 10*3/MM3 (ref 0.7–3.1)
LYMPHOCYTES # BLD AUTO: 0.62 10*3/MM3 (ref 0.7–3.1)
LYMPHOCYTES # BLD AUTO: 0.79 10*3/MM3 (ref 0.7–3.1)
LYMPHOCYTES # BLD AUTO: 0.79 10*3/MM3 (ref 0.7–3.1)
LYMPHOCYTES # BLD AUTO: 0.82 10*3/MM3 (ref 0.7–3.1)
LYMPHOCYTES # BLD AUTO: 0.86 10*3/MM3 (ref 0.7–3.1)
LYMPHOCYTES # BLD AUTO: 0.88 10*3/MM3 (ref 0.7–3.1)
LYMPHOCYTES # BLD AUTO: 1.25 10*3/MM3 (ref 0.7–3.1)
LYMPHOCYTES # BLD MANUAL: 0.49 10*3/MM3 (ref 0.7–3.1)
LYMPHOCYTES NFR BLD AUTO: 10.1 % (ref 19.6–45.3)
LYMPHOCYTES NFR BLD AUTO: 26.2 % (ref 19.6–45.3)
LYMPHOCYTES NFR BLD AUTO: 28.5 % (ref 19.6–45.3)
LYMPHOCYTES NFR BLD AUTO: 30.3 % (ref 19.6–45.3)
LYMPHOCYTES NFR BLD AUTO: 44.6 % (ref 19.6–45.3)
LYMPHOCYTES NFR BLD AUTO: 48.4 % (ref 19.6–45.3)
LYMPHOCYTES NFR BLD AUTO: 50 % (ref 19.6–45.3)
LYMPHOCYTES NFR BLD AUTO: 54.1 % (ref 19.6–45.3)
LYMPHOCYTES NFR BLD AUTO: 62 % (ref 19.6–45.3)
LYMPHOCYTES NFR BLD AUTO: 73.5 % (ref 19.6–45.3)
LYMPHOCYTES NFR BLD MANUAL: 12 % (ref 5–12)
M PNEUMO IGG SER IA-ACNC: NOT DETECTED
MAGNESIUM SERPL-MCNC: 1.2 MG/DL (ref 1.6–2.4)
MAGNESIUM SERPL-MCNC: 1.6 MG/DL (ref 1.6–2.4)
MCH RBC QN AUTO: 26.8 PG (ref 26.6–33)
MCH RBC QN AUTO: 27 PG (ref 26.6–33)
MCH RBC QN AUTO: 27.1 PG (ref 26.6–33)
MCH RBC QN AUTO: 27.3 PG (ref 26.6–33)
MCH RBC QN AUTO: 27.3 PG (ref 26.6–33)
MCH RBC QN AUTO: 27.4 PG (ref 26.6–33)
MCH RBC QN AUTO: 27.5 PG (ref 26.6–33)
MCH RBC QN AUTO: 27.5 PG (ref 26.6–33)
MCH RBC QN AUTO: 27.6 PG (ref 26.6–33)
MCH RBC QN AUTO: 27.6 PG (ref 26.6–33)
MCH RBC QN AUTO: 27.9 PG (ref 26.6–33)
MCH RBC QN AUTO: 27.9 PG (ref 26.6–33)
MCHC RBC AUTO-ENTMCNC: 30.3 G/DL (ref 31.5–35.7)
MCHC RBC AUTO-ENTMCNC: 30.3 G/DL (ref 31.5–35.7)
MCHC RBC AUTO-ENTMCNC: 30.4 G/DL (ref 31.5–35.7)
MCHC RBC AUTO-ENTMCNC: 30.5 G/DL (ref 31.5–35.7)
MCHC RBC AUTO-ENTMCNC: 30.5 G/DL (ref 31.5–35.7)
MCHC RBC AUTO-ENTMCNC: 30.6 G/DL (ref 31.5–35.7)
MCHC RBC AUTO-ENTMCNC: 30.8 G/DL (ref 31.5–35.7)
MCHC RBC AUTO-ENTMCNC: 31 G/DL (ref 31.5–35.7)
MCHC RBC AUTO-ENTMCNC: 31.1 G/DL (ref 31.5–35.7)
MCHC RBC AUTO-ENTMCNC: 31.4 G/DL (ref 31.5–35.7)
MCHC RBC AUTO-ENTMCNC: 32 G/DL (ref 31.5–35.7)
MCHC RBC AUTO-ENTMCNC: 32.3 G/DL (ref 31.5–35.7)
MCV RBC AUTO: 85.6 FL (ref 79–97)
MCV RBC AUTO: 86 FL (ref 79–97)
MCV RBC AUTO: 86.9 FL (ref 79–97)
MCV RBC AUTO: 88 FL (ref 79–97)
MCV RBC AUTO: 89 FL (ref 79–97)
MCV RBC AUTO: 89.1 FL (ref 79–97)
MCV RBC AUTO: 89.3 FL (ref 79–97)
MCV RBC AUTO: 89.5 FL (ref 79–97)
MCV RBC AUTO: 89.6 FL (ref 79–97)
MCV RBC AUTO: 89.7 FL (ref 79–97)
MCV RBC AUTO: 90 FL (ref 79–97)
MCV RBC AUTO: 91.4 FL (ref 79–97)
MONOCYTES # BLD AUTO: 0.07 10*3/MM3 (ref 0.1–0.9)
MONOCYTES # BLD AUTO: 0.08 10*3/MM3 (ref 0.1–0.9)
MONOCYTES # BLD AUTO: 0.11 10*3/MM3 (ref 0.1–0.9)
MONOCYTES # BLD AUTO: 0.17 10*3/MM3 (ref 0.1–0.9)
MONOCYTES # BLD AUTO: 0.18 10*3/MM3 (ref 0.1–0.9)
MONOCYTES # BLD AUTO: 0.21 10*3/MM3 (ref 0.1–0.9)
MONOCYTES # BLD AUTO: 0.23 10*3/MM3 (ref 0.1–0.9)
MONOCYTES # BLD AUTO: 0.24 10*3/MM3 (ref 0.1–0.9)
MONOCYTES # BLD AUTO: 0.34 10*3/MM3 (ref 0.1–0.9)
MONOCYTES # BLD AUTO: 0.45 10*3/MM3 (ref 0.1–0.9)
MONOCYTES # BLD: 0.11 10*3/MM3 (ref 0.1–0.9)
MONOCYTES NFR BLD AUTO: 10.9 % (ref 5–12)
MONOCYTES NFR BLD AUTO: 11.3 % (ref 5–12)
MONOCYTES NFR BLD AUTO: 12 % (ref 5–12)
MONOCYTES NFR BLD AUTO: 13.6 % (ref 5–12)
MONOCYTES NFR BLD AUTO: 14.1 % (ref 5–12)
MONOCYTES NFR BLD AUTO: 14.5 % (ref 5–12)
MONOCYTES NFR BLD AUTO: 4.1 % (ref 5–12)
MONOCYTES NFR BLD AUTO: 5.1 % (ref 5–12)
MONOCYTES NFR BLD AUTO: 7.2 % (ref 5–12)
MONOCYTES NFR BLD AUTO: 8.9 % (ref 5–12)
NEUTROPHILS # BLD AUTO: 0.3 10*3/MM3 (ref 1.7–7)
NEUTROPHILS NFR BLD AUTO: 0.3 10*3/MM3 (ref 1.7–7)
NEUTROPHILS NFR BLD AUTO: 0.31 10*3/MM3 (ref 1.7–7)
NEUTROPHILS NFR BLD AUTO: 0.39 10*3/MM3 (ref 1.7–7)
NEUTROPHILS NFR BLD AUTO: 0.43 10*3/MM3 (ref 1.7–7)
NEUTROPHILS NFR BLD AUTO: 0.52 10*3/MM3 (ref 1.7–7)
NEUTROPHILS NFR BLD AUTO: 0.67 10*3/MM3 (ref 1.7–7)
NEUTROPHILS NFR BLD AUTO: 0.72 10*3/MM3 (ref 1.7–7)
NEUTROPHILS NFR BLD AUTO: 0.97 10*3/MM3 (ref 1.7–7)
NEUTROPHILS NFR BLD AUTO: 1.8 10*3/MM3 (ref 1.7–7)
NEUTROPHILS NFR BLD AUTO: 17.7 % (ref 42.7–76)
NEUTROPHILS NFR BLD AUTO: 21.8 % (ref 42.7–76)
NEUTROPHILS NFR BLD AUTO: 24.5 % (ref 42.7–76)
NEUTROPHILS NFR BLD AUTO: 3.86 10*3/MM3 (ref 1.7–7)
NEUTROPHILS NFR BLD AUTO: 32.8 % (ref 42.7–76)
NEUTROPHILS NFR BLD AUTO: 33.6 % (ref 42.7–76)
NEUTROPHILS NFR BLD AUTO: 37.9 % (ref 42.7–76)
NEUTROPHILS NFR BLD AUTO: 47.4 % (ref 42.7–76)
NEUTROPHILS NFR BLD AUTO: 52.1 % (ref 42.7–76)
NEUTROPHILS NFR BLD AUTO: 57.5 % (ref 42.7–76)
NEUTROPHILS NFR BLD AUTO: 76.6 % (ref 42.7–76)
NEUTROPHILS NFR BLD MANUAL: 32 % (ref 42.7–76)
NITRITE UR QL STRIP: NEGATIVE
NITRITE UR QL STRIP: POSITIVE
NRBC BLD AUTO-RTO: 0 /100 WBC (ref 0–0.2)
PH UR STRIP.AUTO: 5.5 [PH] (ref 4.5–8)
PH UR STRIP.AUTO: 7.5 [PH] (ref 4.5–8)
PLAT MORPH BLD: NORMAL
PLATELET # BLD AUTO: 100 10*3/MM3 (ref 140–450)
PLATELET # BLD AUTO: 100 10*3/MM3 (ref 140–450)
PLATELET # BLD AUTO: 101 10*3/MM3 (ref 140–450)
PLATELET # BLD AUTO: 105 10*3/MM3 (ref 140–450)
PLATELET # BLD AUTO: 125 10*3/MM3 (ref 140–450)
PLATELET # BLD AUTO: 35 10*3/MM3 (ref 140–450)
PLATELET # BLD AUTO: 36 10*3/MM3 (ref 140–450)
PLATELET # BLD AUTO: 51 10*3/MM3 (ref 140–450)
PLATELET # BLD AUTO: 58 10*3/MM3 (ref 140–450)
PLATELET # BLD AUTO: 62 10*3/MM3 (ref 140–450)
PLATELET # BLD AUTO: 68 10*3/MM3 (ref 140–450)
PLATELET # BLD AUTO: 91 10*3/MM3 (ref 140–450)
PMV BLD AUTO: ABNORMAL FL
POLYCHROMASIA BLD QL SMEAR: NORMAL
POTASSIUM SERPL-SCNC: 3.1 MMOL/L (ref 3.5–5.2)
POTASSIUM SERPL-SCNC: 3.5 MMOL/L (ref 3.5–5.2)
POTASSIUM SERPL-SCNC: 3.6 MMOL/L (ref 3.5–5.2)
POTASSIUM SERPL-SCNC: 3.6 MMOL/L (ref 3.5–5.2)
POTASSIUM SERPL-SCNC: 3.8 MMOL/L (ref 3.5–5.2)
POTASSIUM SERPL-SCNC: 3.8 MMOL/L (ref 3.5–5.2)
POTASSIUM SERPL-SCNC: 3.9 MMOL/L (ref 3.5–5.2)
POTASSIUM SERPL-SCNC: 4.1 MMOL/L (ref 3.5–5.2)
POTASSIUM SERPL-SCNC: 4.1 MMOL/L (ref 3.5–5.2)
PROCALCITONIN SERPL-MCNC: 0.26 NG/ML (ref 0–0.25)
PROCALCITONIN SERPL-MCNC: 0.46 NG/ML (ref 0–0.25)
PROCALCITONIN SERPL-MCNC: 0.51 NG/ML (ref 0–0.25)
PROCALCITONIN SERPL-MCNC: 0.52 NG/ML (ref 0–0.25)
PROCALCITONIN SERPL-MCNC: 0.61 NG/ML (ref 0–0.25)
PROCALCITONIN SERPL-MCNC: 0.7 NG/ML (ref 0–0.25)
PROT SERPL-MCNC: 5.3 G/DL (ref 6–8.5)
PROT SERPL-MCNC: 5.9 G/DL (ref 6–8.5)
PROT SERPL-MCNC: 6.1 G/DL (ref 6–8.5)
PROT UR QL STRIP: ABNORMAL
PROT UR QL STRIP: ABNORMAL
RBC # BLD AUTO: 2.31 10*6/MM3 (ref 3.77–5.28)
RBC # BLD AUTO: 2.66 10*6/MM3 (ref 3.77–5.28)
RBC # BLD AUTO: 2.67 10*6/MM3 (ref 3.77–5.28)
RBC # BLD AUTO: 2.7 10*6/MM3 (ref 3.77–5.28)
RBC # BLD AUTO: 2.91 10*6/MM3 (ref 3.77–5.28)
RBC # BLD AUTO: 2.93 10*6/MM3 (ref 3.77–5.28)
RBC # BLD AUTO: 3.26 10*6/MM3 (ref 3.77–5.28)
RBC # BLD AUTO: 3.26 10*6/MM3 (ref 3.77–5.28)
RBC # BLD AUTO: 3.31 10*6/MM3 (ref 3.77–5.28)
RBC # BLD AUTO: 3.37 10*6/MM3 (ref 3.77–5.28)
RBC # BLD AUTO: 3.39 10*6/MM3 (ref 3.77–5.28)
RBC # BLD AUTO: 4.07 10*6/MM3 (ref 3.77–5.28)
RBC # UR STRIP: ABNORMAL /HPF
RBC # UR STRIP: NORMAL /HPF
RBC MORPH BLD: NORMAL
REF LAB TEST METHOD: ABNORMAL
REF LAB TEST METHOD: NORMAL
REF LAB TEST METHOD: NORMAL
RH BLD: POSITIVE
RHINOVIRUS RNA SPEC NAA+PROBE: NOT DETECTED
RSV RNA NPH QL NAA+NON-PROBE: DETECTED
S PNEUM AG SPEC QL LA: NEGATIVE
SARS-COV-2 RNA NPH QL NAA+NON-PROBE: NOT DETECTED
SARS-COV-2 RNA PNL SPEC NAA+PROBE: NOT DETECTED
SARS-COV-2 RNA RESP QL NAA+PROBE: NOT DETECTED
SCHISTOCYTES BLD QL SMEAR: NORMAL
SMALL PLATELETS BLD QL SMEAR: NORMAL
SMALL PLATELETS BLD QL SMEAR: NORMAL
SODIUM SERPL-SCNC: 133 MMOL/L (ref 136–145)
SODIUM SERPL-SCNC: 136 MMOL/L (ref 136–145)
SODIUM SERPL-SCNC: 137 MMOL/L (ref 136–145)
SODIUM SERPL-SCNC: 137 MMOL/L (ref 136–145)
SODIUM SERPL-SCNC: 138 MMOL/L (ref 136–145)
SODIUM SERPL-SCNC: 139 MMOL/L (ref 136–145)
SODIUM SERPL-SCNC: 139 MMOL/L (ref 136–145)
SODIUM SERPL-SCNC: 140 MMOL/L (ref 136–145)
SP GR UR STRIP: 1.02 (ref 1–1.03)
SP GR UR STRIP: 1.02 (ref 1–1.03)
SQUAMOUS #/AREA URNS HPF: ABNORMAL /HPF
SQUAMOUS #/AREA URNS HPF: NORMAL /HPF
T&S EXPIRATION DATE: NORMAL
TSH SERPL DL<=0.05 MIU/L-ACNC: 5.09 UIU/ML (ref 0.27–4.2)
UNIT  ABO: NORMAL
UNIT  RH: NORMAL
UROBILINOGEN UR QL STRIP: ABNORMAL
UROBILINOGEN UR QL STRIP: ABNORMAL
VARIANT LYMPHS NFR BLD MANUAL: 4 % (ref 0–5)
VARIANT LYMPHS NFR BLD MANUAL: 48 % (ref 19.6–45.3)
VIT B12 BLD-MCNC: 682 PG/ML (ref 211–946)
WBC # UR STRIP: ABNORMAL /HPF
WBC # UR STRIP: NORMAL /HPF
WBC MORPH BLD: NORMAL
WBC NRBC COR # BLD: 0.94 10*3/MM3 (ref 3.4–10.8)
WBC NRBC COR # BLD: 1.28 10*3/MM3 (ref 3.4–10.8)
WBC NRBC COR # BLD: 1.42 10*3/MM3 (ref 3.4–10.8)
WBC NRBC COR # BLD: 1.58 10*3/MM3 (ref 3.4–10.8)
WBC NRBC COR # BLD: 1.59 10*3/MM3 (ref 3.4–10.8)
WBC NRBC COR # BLD: 1.68 10*3/MM3 (ref 3.4–10.8)
WBC NRBC COR # BLD: 1.7 10*3/MM3 (ref 3.4–10.8)
WBC NRBC COR # BLD: 1.77 10*3/MM3 (ref 3.4–10.8)
WBC NRBC COR # BLD: 1.8 10*3/MM3 (ref 3.4–10.8)
WBC NRBC COR # BLD: 1.86 10*3/MM3 (ref 3.4–10.8)
WBC NRBC COR # BLD: 3.13 10*3/MM3 (ref 3.4–10.8)
WBC NRBC COR # BLD: 5.04 10*3/MM3 (ref 3.4–10.8)

## 2022-01-01 PROCEDURE — 80053 COMPREHEN METABOLIC PANEL: CPT | Performed by: PHYSICIAN ASSISTANT

## 2022-01-01 PROCEDURE — 84145 PROCALCITONIN (PCT): CPT | Performed by: INTERNAL MEDICINE

## 2022-01-01 PROCEDURE — 25010000002 ERYTHROMYCIN LACTOBIONATE PER 500 MG: Performed by: NURSE PRACTITIONER

## 2022-01-01 PROCEDURE — 63710000001 PROMETHAZINE PER 12.5 MG: Performed by: INTERNAL MEDICINE

## 2022-01-01 PROCEDURE — 87899 AGENT NOS ASSAY W/OPTIC: CPT | Performed by: INTERNAL MEDICINE

## 2022-01-01 PROCEDURE — 63710000001 INSULIN ASPART PER 5 UNITS: Performed by: INTERNAL MEDICINE

## 2022-01-01 PROCEDURE — 87077 CULTURE AEROBIC IDENTIFY: CPT | Performed by: EMERGENCY MEDICINE

## 2022-01-01 PROCEDURE — 96361 HYDRATE IV INFUSION ADD-ON: CPT

## 2022-01-01 PROCEDURE — 25010000002 AZITHROMYCIN PER 500 MG: Performed by: EMERGENCY MEDICINE

## 2022-01-01 PROCEDURE — 25010000002 CEFTRIAXONE SODIUM-DEXTROSE 2-2.22 GM-%(50ML) RECONSTITUTED SOLUTION: Performed by: INTERNAL MEDICINE

## 2022-01-01 PROCEDURE — 85025 COMPLETE CBC W/AUTO DIFF WBC: CPT

## 2022-01-01 PROCEDURE — 87449 NOS EACH ORGANISM AG IA: CPT | Performed by: NURSE PRACTITIONER

## 2022-01-01 PROCEDURE — 85025 COMPLETE CBC W/AUTO DIFF WBC: CPT | Performed by: HOSPITALIST

## 2022-01-01 PROCEDURE — 63710000001 ONDANSETRON PER 8 MG: Performed by: INTERNAL MEDICINE

## 2022-01-01 PROCEDURE — 0 IOPAMIDOL PER 1 ML: Performed by: EMERGENCY MEDICINE

## 2022-01-01 PROCEDURE — 99226 PR SBSQ OBSERVATION CARE/DAY 35 MINUTES: CPT | Performed by: NURSE PRACTITIONER

## 2022-01-01 PROCEDURE — 63710000001 DIPHENHYDRAMINE PER 50 MG: Performed by: INTERNAL MEDICINE

## 2022-01-01 PROCEDURE — 82962 GLUCOSE BLOOD TEST: CPT

## 2022-01-01 PROCEDURE — 94799 UNLISTED PULMONARY SVC/PX: CPT

## 2022-01-01 PROCEDURE — A9270 NON-COVERED ITEM OR SERVICE: HCPCS | Performed by: INTERNAL MEDICINE

## 2022-01-01 PROCEDURE — 25010000002 CEFEPIME-DEXTROSE 2-5 GM-%(50ML) RECONSTITUTED SOLUTION: Performed by: INTERNAL MEDICINE

## 2022-01-01 PROCEDURE — P9016 RBC LEUKOCYTES REDUCED: HCPCS

## 2022-01-01 PROCEDURE — 99225 PR SBSQ OBSERVATION CARE/DAY 25 MINUTES: CPT | Performed by: INTERNAL MEDICINE

## 2022-01-01 PROCEDURE — 0 POTASSIUM CHLORIDE 10 MEQ/100ML SOLUTION: Performed by: INTERNAL MEDICINE

## 2022-01-01 PROCEDURE — 85007 BL SMEAR W/DIFF WBC COUNT: CPT | Performed by: INTERNAL MEDICINE

## 2022-01-01 PROCEDURE — A9541 TC99M SULFUR COLLOID: HCPCS | Performed by: INTERNAL MEDICINE

## 2022-01-01 PROCEDURE — 96375 TX/PRO/DX INJ NEW DRUG ADDON: CPT

## 2022-01-01 PROCEDURE — 80048 BASIC METABOLIC PNL TOTAL CA: CPT | Performed by: INTERNAL MEDICINE

## 2022-01-01 PROCEDURE — 99219 PR INITIAL OBSERVATION CARE/DAY 50 MINUTES: CPT | Performed by: INTERNAL MEDICINE

## 2022-01-01 PROCEDURE — 25010000002 FILGRASTIM 300 MCG/0.5ML SOLUTION PREFILLED SYRINGE: Performed by: INTERNAL MEDICINE

## 2022-01-01 PROCEDURE — 99214 OFFICE O/P EST MOD 30 MIN: CPT | Performed by: NURSE PRACTITIONER

## 2022-01-01 PROCEDURE — 86900 BLOOD TYPING SEROLOGIC ABO: CPT

## 2022-01-01 PROCEDURE — 86923 COMPATIBILITY TEST ELECTRIC: CPT

## 2022-01-01 PROCEDURE — 83036 HEMOGLOBIN GLYCOSYLATED A1C: CPT | Performed by: INTERNAL MEDICINE

## 2022-01-01 PROCEDURE — 82746 ASSAY OF FOLIC ACID SERUM: CPT | Performed by: INTERNAL MEDICINE

## 2022-01-01 PROCEDURE — 63710000001 INSULIN DETEMIR PER 5 UNITS: Performed by: INTERNAL MEDICINE

## 2022-01-01 PROCEDURE — 25010000002 MAGNESIUM SULFATE 2 GM/50ML SOLUTION: Performed by: INTERNAL MEDICINE

## 2022-01-01 PROCEDURE — 84132 ASSAY OF SERUM POTASSIUM: CPT | Performed by: INTERNAL MEDICINE

## 2022-01-01 PROCEDURE — 96365 THER/PROPH/DIAG IV INF INIT: CPT

## 2022-01-01 PROCEDURE — 85027 COMPLETE CBC AUTOMATED: CPT | Performed by: INTERNAL MEDICINE

## 2022-01-01 PROCEDURE — G0378 HOSPITAL OBSERVATION PER HR: HCPCS

## 2022-01-01 PROCEDURE — 99215 OFFICE O/P EST HI 40 MIN: CPT | Performed by: INTERNAL MEDICINE

## 2022-01-01 PROCEDURE — 99232 SBSQ HOSP IP/OBS MODERATE 35: CPT | Performed by: HOSPITALIST

## 2022-01-01 PROCEDURE — 25010000002 ONDANSETRON PER 1 MG: Performed by: EMERGENCY MEDICINE

## 2022-01-01 PROCEDURE — 99285 EMERGENCY DEPT VISIT HI MDM: CPT

## 2022-01-01 PROCEDURE — 99232 SBSQ HOSP IP/OBS MODERATE 35: CPT | Performed by: INTERNAL MEDICINE

## 2022-01-01 PROCEDURE — 25010000002 ONDANSETRON PER 1 MG: Performed by: NURSE PRACTITIONER

## 2022-01-01 PROCEDURE — 96366 THER/PROPH/DIAG IV INF ADDON: CPT

## 2022-01-01 PROCEDURE — 83735 ASSAY OF MAGNESIUM: CPT | Performed by: INTERNAL MEDICINE

## 2022-01-01 PROCEDURE — 87324 CLOSTRIDIUM AG IA: CPT | Performed by: NURSE PRACTITIONER

## 2022-01-01 PROCEDURE — 36430 TRANSFUSION BLD/BLD COMPNT: CPT

## 2022-01-01 PROCEDURE — 99239 HOSP IP/OBS DSCHRG MGMT >30: CPT | Performed by: INTERNAL MEDICINE

## 2022-01-01 PROCEDURE — 85025 COMPLETE CBC W/AUTO DIFF WBC: CPT | Performed by: EMERGENCY MEDICINE

## 2022-01-01 PROCEDURE — 81001 URINALYSIS AUTO W/SCOPE: CPT | Performed by: EMERGENCY MEDICINE

## 2022-01-01 PROCEDURE — 86900 BLOOD TYPING SEROLOGIC ABO: CPT | Performed by: INTERNAL MEDICINE

## 2022-01-01 PROCEDURE — 85025 COMPLETE CBC W/AUTO DIFF WBC: CPT | Performed by: INTERNAL MEDICINE

## 2022-01-01 PROCEDURE — 87040 BLOOD CULTURE FOR BACTERIA: CPT | Performed by: NURSE PRACTITIONER

## 2022-01-01 PROCEDURE — 63710000001 ACETAMINOPHEN 325 MG TABLET: Performed by: INTERNAL MEDICINE

## 2022-01-01 PROCEDURE — 87186 SC STD MICRODIL/AGAR DIL: CPT | Performed by: EMERGENCY MEDICINE

## 2022-01-01 PROCEDURE — 86140 C-REACTIVE PROTEIN: CPT | Performed by: NURSE PRACTITIONER

## 2022-01-01 PROCEDURE — 99284 EMERGENCY DEPT VISIT MOD MDM: CPT

## 2022-01-01 PROCEDURE — 86901 BLOOD TYPING SEROLOGIC RH(D): CPT | Performed by: INTERNAL MEDICINE

## 2022-01-01 PROCEDURE — 87150 DNA/RNA AMPLIFIED PROBE: CPT | Performed by: EMERGENCY MEDICINE

## 2022-01-01 PROCEDURE — 96376 TX/PRO/DX INJ SAME DRUG ADON: CPT

## 2022-01-01 PROCEDURE — 99217 PR OBSERVATION CARE DISCHARGE MANAGEMENT: CPT | Performed by: INTERNAL MEDICINE

## 2022-01-01 PROCEDURE — 25010000002 ONDANSETRON PER 1 MG: Performed by: PHYSICIAN ASSISTANT

## 2022-01-01 PROCEDURE — 85025 COMPLETE CBC W/AUTO DIFF WBC: CPT | Performed by: PHYSICIAN ASSISTANT

## 2022-01-01 PROCEDURE — 88185 FLOWCYTOMETRY/TC ADD-ON: CPT | Performed by: INTERNAL MEDICINE

## 2022-01-01 PROCEDURE — 74177 CT ABD & PELVIS W/CONTRAST: CPT

## 2022-01-01 PROCEDURE — 36415 COLL VENOUS BLD VENIPUNCTURE: CPT

## 2022-01-01 PROCEDURE — 80053 COMPREHEN METABOLIC PANEL: CPT | Performed by: EMERGENCY MEDICINE

## 2022-01-01 PROCEDURE — 87147 CULTURE TYPE IMMUNOLOGIC: CPT | Performed by: EMERGENCY MEDICINE

## 2022-01-01 PROCEDURE — 82607 VITAMIN B-12: CPT | Performed by: INTERNAL MEDICINE

## 2022-01-01 PROCEDURE — 78264 GASTRIC EMPTYING IMG STUDY: CPT

## 2022-01-01 PROCEDURE — 25010000002 CEFEPIME-DEXTROSE 2-5 GM-%(50ML) RECONSTITUTED SOLUTION: Performed by: NURSE PRACTITIONER

## 2022-01-01 PROCEDURE — 87636 SARSCOV2 & INF A&B AMP PRB: CPT | Performed by: EMERGENCY MEDICINE

## 2022-01-01 PROCEDURE — 85014 HEMATOCRIT: CPT | Performed by: INTERNAL MEDICINE

## 2022-01-01 PROCEDURE — 84145 PROCALCITONIN (PCT): CPT | Performed by: HOSPITALIST

## 2022-01-01 PROCEDURE — 25010000002 IOPAMIDOL 61 % SOLUTION: Performed by: EMERGENCY MEDICINE

## 2022-01-01 PROCEDURE — 86901 BLOOD TYPING SEROLOGIC RH(D): CPT | Performed by: PHYSICIAN ASSISTANT

## 2022-01-01 PROCEDURE — 85025 COMPLETE CBC W/AUTO DIFF WBC: CPT | Performed by: NURSE PRACTITIONER

## 2022-01-01 PROCEDURE — 81001 URINALYSIS AUTO W/SCOPE: CPT | Performed by: PHYSICIAN ASSISTANT

## 2022-01-01 PROCEDURE — 86850 RBC ANTIBODY SCREEN: CPT | Performed by: INTERNAL MEDICINE

## 2022-01-01 PROCEDURE — 0 TECHNETIUM SULFUR COLLOID: Performed by: INTERNAL MEDICINE

## 2022-01-01 PROCEDURE — 99214 OFFICE O/P EST MOD 30 MIN: CPT | Performed by: INTERNAL MEDICINE

## 2022-01-01 PROCEDURE — 0202U NFCT DS 22 TRGT SARS-COV-2: CPT | Performed by: HOSPITALIST

## 2022-01-01 PROCEDURE — 25010000002 CEFTRIAXONE SODIUM-DEXTROSE 2-2.22 GM-%(50ML) RECONSTITUTED SOLUTION: Performed by: EMERGENCY MEDICINE

## 2022-01-01 PROCEDURE — 87635 SARS-COV-2 COVID-19 AMP PRB: CPT | Performed by: EMERGENCY MEDICINE

## 2022-01-01 PROCEDURE — 25010000002 ONDANSETRON PER 1 MG: Performed by: INTERNAL MEDICINE

## 2022-01-01 PROCEDURE — 84145 PROCALCITONIN (PCT): CPT | Performed by: NURSE PRACTITIONER

## 2022-01-01 PROCEDURE — 80053 COMPREHEN METABOLIC PANEL: CPT | Performed by: INTERNAL MEDICINE

## 2022-01-01 PROCEDURE — 25010000002 CEFTRIAXONE SODIUM-DEXTROSE 1-3.74 GM-%(50ML) RECONSTITUTED SOLUTION: Performed by: HOSPITALIST

## 2022-01-01 PROCEDURE — 83690 ASSAY OF LIPASE: CPT | Performed by: PHYSICIAN ASSISTANT

## 2022-01-01 PROCEDURE — 87040 BLOOD CULTURE FOR BACTERIA: CPT | Performed by: EMERGENCY MEDICINE

## 2022-01-01 PROCEDURE — 92610 EVALUATE SWALLOWING FUNCTION: CPT

## 2022-01-01 PROCEDURE — 85652 RBC SED RATE AUTOMATED: CPT | Performed by: NURSE PRACTITIONER

## 2022-01-01 PROCEDURE — 87086 URINE CULTURE/COLONY COUNT: CPT | Performed by: EMERGENCY MEDICINE

## 2022-01-01 PROCEDURE — 99223 1ST HOSP IP/OBS HIGH 75: CPT | Performed by: INTERNAL MEDICINE

## 2022-01-01 PROCEDURE — 80048 BASIC METABOLIC PNL TOTAL CA: CPT | Performed by: NURSE PRACTITIONER

## 2022-01-01 PROCEDURE — 96368 THER/DIAG CONCURRENT INF: CPT

## 2022-01-01 PROCEDURE — 84443 ASSAY THYROID STIM HORMONE: CPT | Performed by: INTERNAL MEDICINE

## 2022-01-01 PROCEDURE — 86850 RBC ANTIBODY SCREEN: CPT | Performed by: PHYSICIAN ASSISTANT

## 2022-01-01 PROCEDURE — 84145 PROCALCITONIN (PCT): CPT | Performed by: EMERGENCY MEDICINE

## 2022-01-01 PROCEDURE — 86900 BLOOD TYPING SEROLOGIC ABO: CPT | Performed by: PHYSICIAN ASSISTANT

## 2022-01-01 PROCEDURE — 85018 HEMOGLOBIN: CPT | Performed by: INTERNAL MEDICINE

## 2022-01-01 PROCEDURE — 88184 FLOWCYTOMETRY/ TC 1 MARKER: CPT | Performed by: INTERNAL MEDICINE

## 2022-01-01 PROCEDURE — C9803 HOPD COVID-19 SPEC COLLECT: HCPCS

## 2022-01-01 PROCEDURE — 99285 EMERGENCY DEPT VISIT HI MDM: CPT | Performed by: EMERGENCY MEDICINE

## 2022-01-01 PROCEDURE — 99233 SBSQ HOSP IP/OBS HIGH 50: CPT | Performed by: HOSPITALIST

## 2022-01-01 PROCEDURE — 92526 ORAL FUNCTION THERAPY: CPT

## 2022-01-01 PROCEDURE — 25010000002 PROCHLORPERAZINE 10 MG/2ML SOLUTION: Performed by: INTERNAL MEDICINE

## 2022-01-01 PROCEDURE — 80048 BASIC METABOLIC PNL TOTAL CA: CPT | Performed by: HOSPITALIST

## 2022-01-01 PROCEDURE — 99284 EMERGENCY DEPT VISIT MOD MDM: CPT | Performed by: PHYSICIAN ASSISTANT

## 2022-01-01 PROCEDURE — 83605 ASSAY OF LACTIC ACID: CPT | Performed by: EMERGENCY MEDICINE

## 2022-01-01 PROCEDURE — 25010000002 ONDANSETRON PER 1 MG

## 2022-01-01 PROCEDURE — 71046 X-RAY EXAM CHEST 2 VIEWS: CPT

## 2022-01-01 PROCEDURE — 25010000002 FENTANYL CITRATE (PF) 50 MCG/ML SOLUTION: Performed by: INTERNAL MEDICINE

## 2022-01-01 PROCEDURE — 83690 ASSAY OF LIPASE: CPT | Performed by: EMERGENCY MEDICINE

## 2022-01-01 PROCEDURE — 94640 AIRWAY INHALATION TREATMENT: CPT

## 2022-01-01 PROCEDURE — 25010000002 FUROSEMIDE PER 20 MG

## 2022-01-01 PROCEDURE — 88182 CELL MARKER STUDY: CPT | Performed by: INTERNAL MEDICINE

## 2022-01-01 RX ORDER — LEVOTHYROXINE SODIUM 88 UG/1
88 TABLET ORAL DAILY
Status: DISCONTINUED | OUTPATIENT
Start: 2022-01-01 | End: 2022-01-01

## 2022-01-01 RX ORDER — GABAPENTIN 100 MG/1
100 CAPSULE ORAL 3 TIMES DAILY
Status: DISCONTINUED | OUTPATIENT
Start: 2022-01-01 | End: 2022-01-01 | Stop reason: HOSPADM

## 2022-01-01 RX ORDER — DIPHENHYDRAMINE HCL 25 MG
25 CAPSULE ORAL ONCE
Status: COMPLETED | OUTPATIENT
Start: 2022-01-01 | End: 2022-01-01

## 2022-01-01 RX ORDER — ACETAMINOPHEN 325 MG/1
650 TABLET ORAL EVERY 4 HOURS PRN
Status: DISCONTINUED | OUTPATIENT
Start: 2022-01-01 | End: 2022-01-01 | Stop reason: HOSPADM

## 2022-01-01 RX ORDER — BISACODYL 5 MG/1
5 TABLET, DELAYED RELEASE ORAL DAILY PRN
Status: DISCONTINUED | OUTPATIENT
Start: 2022-01-01 | End: 2022-01-01 | Stop reason: HOSPADM

## 2022-01-01 RX ORDER — ALBUTEROL SULFATE 2.5 MG/3ML
2.5 SOLUTION RESPIRATORY (INHALATION)
Status: COMPLETED | OUTPATIENT
Start: 2022-01-01 | End: 2022-01-01

## 2022-01-01 RX ORDER — ONDANSETRON 2 MG/ML
4 INJECTION INTRAMUSCULAR; INTRAVENOUS EVERY 6 HOURS PRN
Status: DISCONTINUED | OUTPATIENT
Start: 2022-01-01 | End: 2022-01-01 | Stop reason: HOSPADM

## 2022-01-01 RX ORDER — SODIUM CHLORIDE 9 MG/ML
40 INJECTION, SOLUTION INTRAVENOUS AS NEEDED
Status: DISCONTINUED | OUTPATIENT
Start: 2022-01-01 | End: 2022-01-01 | Stop reason: HOSPADM

## 2022-01-01 RX ORDER — SODIUM CHLORIDE 0.9 % (FLUSH) 0.9 %
10 SYRINGE (ML) INJECTION EVERY 12 HOURS SCHEDULED
Status: DISCONTINUED | OUTPATIENT
Start: 2022-01-01 | End: 2022-01-01 | Stop reason: HOSPADM

## 2022-01-01 RX ORDER — HYDROCODONE BITARTRATE AND ACETAMINOPHEN 5; 325 MG/1; MG/1
1 TABLET ORAL 2 TIMES DAILY PRN
Status: DISCONTINUED | OUTPATIENT
Start: 2022-01-01 | End: 2022-01-01 | Stop reason: HOSPADM

## 2022-01-01 RX ORDER — SODIUM CHLORIDE 9 MG/ML
250 INJECTION, SOLUTION INTRAVENOUS AS NEEDED
Status: DISCONTINUED | OUTPATIENT
Start: 2022-01-01 | End: 2022-01-01 | Stop reason: HOSPADM

## 2022-01-01 RX ORDER — CEFEPIME HYDROCHLORIDE 2 G/50ML
2 INJECTION, SOLUTION INTRAVENOUS EVERY 12 HOURS
Status: DISCONTINUED | OUTPATIENT
Start: 2022-01-01 | End: 2022-01-01

## 2022-01-01 RX ORDER — ACETAMINOPHEN 650 MG/1
650 SUPPOSITORY RECTAL EVERY 4 HOURS PRN
Status: DISCONTINUED | OUTPATIENT
Start: 2022-01-01 | End: 2022-01-01 | Stop reason: HOSPADM

## 2022-01-01 RX ORDER — ONDANSETRON 2 MG/ML
4 INJECTION INTRAMUSCULAR; INTRAVENOUS ONCE
Status: COMPLETED | OUTPATIENT
Start: 2022-01-01 | End: 2022-01-01

## 2022-01-01 RX ORDER — NYSTATIN 100000 [USP'U]/G
POWDER TOPICAL EVERY 12 HOURS SCHEDULED
Status: DISCONTINUED | OUTPATIENT
Start: 2022-01-01 | End: 2022-01-01 | Stop reason: HOSPADM

## 2022-01-01 RX ORDER — ACETAMINOPHEN 160 MG/5ML
650 SOLUTION ORAL EVERY 4 HOURS PRN
Status: DISCONTINUED | OUTPATIENT
Start: 2022-01-01 | End: 2022-01-01 | Stop reason: HOSPADM

## 2022-01-01 RX ORDER — SODIUM CHLORIDE 9 MG/ML
75 INJECTION, SOLUTION INTRAVENOUS CONTINUOUS
Status: DISCONTINUED | OUTPATIENT
Start: 2022-01-01 | End: 2022-01-01

## 2022-01-01 RX ORDER — IPRATROPIUM BROMIDE AND ALBUTEROL SULFATE 2.5; .5 MG/3ML; MG/3ML
3 SOLUTION RESPIRATORY (INHALATION) ONCE
Status: COMPLETED | OUTPATIENT
Start: 2022-01-01 | End: 2022-01-01

## 2022-01-01 RX ORDER — ATORVASTATIN CALCIUM 10 MG/1
10 TABLET, FILM COATED ORAL NIGHTLY
Status: DISCONTINUED | OUTPATIENT
Start: 2022-01-01 | End: 2022-01-01 | Stop reason: HOSPADM

## 2022-01-01 RX ORDER — PROMETHAZINE HYDROCHLORIDE 25 MG/1
25 TABLET ORAL EVERY 6 HOURS PRN
Status: DISCONTINUED | OUTPATIENT
Start: 2022-01-01 | End: 2022-01-01 | Stop reason: HOSPADM

## 2022-01-01 RX ORDER — TAMSULOSIN HYDROCHLORIDE 0.4 MG/1
0.4 CAPSULE ORAL NIGHTLY
Status: DISCONTINUED | OUTPATIENT
Start: 2022-01-01 | End: 2022-01-01 | Stop reason: HOSPADM

## 2022-01-01 RX ORDER — ALBUTEROL SULFATE 90 UG/1
1 AEROSOL, METERED RESPIRATORY (INHALATION)
Status: DISCONTINUED | OUTPATIENT
Start: 2022-01-01 | End: 2022-01-01

## 2022-01-01 RX ORDER — PANTOPRAZOLE SODIUM 40 MG/1
40 TABLET, DELAYED RELEASE ORAL EVERY MORNING
Refills: 3 | Status: DISCONTINUED | OUTPATIENT
Start: 2022-01-01 | End: 2022-01-01 | Stop reason: HOSPADM

## 2022-01-01 RX ORDER — METAXALONE 800 MG/1
400 TABLET ORAL EVERY 8 HOURS PRN
COMMUNITY
End: 2022-01-01 | Stop reason: HOSPADM

## 2022-01-01 RX ORDER — FUROSEMIDE 10 MG/ML
20 INJECTION INTRAMUSCULAR; INTRAVENOUS ONCE
Status: COMPLETED | OUTPATIENT
Start: 2022-01-01 | End: 2022-01-01

## 2022-01-01 RX ORDER — ONDANSETRON 4 MG/1
4 TABLET, FILM COATED ORAL EVERY 6 HOURS PRN
Status: DISCONTINUED | OUTPATIENT
Start: 2022-01-01 | End: 2022-01-01 | Stop reason: HOSPADM

## 2022-01-01 RX ORDER — IPRATROPIUM BROMIDE AND ALBUTEROL SULFATE 2.5; .5 MG/3ML; MG/3ML
3 SOLUTION RESPIRATORY (INHALATION)
Status: DISCONTINUED | OUTPATIENT
Start: 2022-01-01 | End: 2022-01-01 | Stop reason: HOSPADM

## 2022-01-01 RX ORDER — LEVOTHYROXINE SODIUM 88 UG/1
88 TABLET ORAL DAILY
Status: DISCONTINUED | OUTPATIENT
Start: 2022-01-01 | End: 2022-01-01 | Stop reason: HOSPADM

## 2022-01-01 RX ORDER — CEFEPIME HYDROCHLORIDE 2 G/50ML
2 INJECTION, SOLUTION INTRAVENOUS EVERY 12 HOURS
Status: DISCONTINUED | OUTPATIENT
Start: 2022-01-01 | End: 2022-01-01 | Stop reason: HOSPADM

## 2022-01-01 RX ORDER — SODIUM CHLORIDE 0.9 % (FLUSH) 0.9 %
10 SYRINGE (ML) INJECTION AS NEEDED
Status: DISCONTINUED | OUTPATIENT
Start: 2022-01-01 | End: 2022-01-01 | Stop reason: HOSPADM

## 2022-01-01 RX ORDER — PROMETHAZINE HYDROCHLORIDE 12.5 MG/1
6.25 TABLET ORAL EVERY 6 HOURS PRN
Status: DISCONTINUED | OUTPATIENT
Start: 2022-01-01 | End: 2022-01-01 | Stop reason: HOSPADM

## 2022-01-01 RX ORDER — DIPHENHYDRAMINE HYDROCHLORIDE 50 MG/ML
25 INJECTION INTRAMUSCULAR; INTRAVENOUS ONCE
Status: COMPLETED | OUTPATIENT
Start: 2022-01-01 | End: 2022-01-01

## 2022-01-01 RX ORDER — AMIODARONE HYDROCHLORIDE 200 MG/1
200 TABLET ORAL DAILY
Status: DISCONTINUED | OUTPATIENT
Start: 2022-01-01 | End: 2022-01-01 | Stop reason: HOSPADM

## 2022-01-01 RX ORDER — MAGNESIUM SULFATE HEPTAHYDRATE 40 MG/ML
2 INJECTION, SOLUTION INTRAVENOUS ONCE
Status: COMPLETED | OUTPATIENT
Start: 2022-01-01 | End: 2022-01-01

## 2022-01-01 RX ORDER — ONDANSETRON 4 MG/1
4 TABLET, FILM COATED ORAL EVERY 6 HOURS PRN
Status: DISCONTINUED | OUTPATIENT
Start: 2022-01-01 | End: 2022-01-01

## 2022-01-01 RX ORDER — SODIUM CHLORIDE 9 MG/ML
INJECTION, SOLUTION INTRAVENOUS
Status: COMPLETED
Start: 2022-01-01 | End: 2022-01-01

## 2022-01-01 RX ORDER — LEVOTHYROXINE SODIUM 0.07 MG/1
75 TABLET ORAL DAILY
Status: DISCONTINUED | OUTPATIENT
Start: 2022-01-01 | End: 2022-01-01

## 2022-01-01 RX ORDER — DEXTROSE MONOHYDRATE 25 G/50ML
25 INJECTION, SOLUTION INTRAVENOUS
Status: DISCONTINUED | OUTPATIENT
Start: 2022-01-01 | End: 2022-01-01 | Stop reason: HOSPADM

## 2022-01-01 RX ORDER — ONDANSETRON 2 MG/ML
8 INJECTION INTRAMUSCULAR; INTRAVENOUS ONCE
Status: COMPLETED | OUTPATIENT
Start: 2022-01-01 | End: 2022-01-01

## 2022-01-01 RX ORDER — DESVENLAFAXINE SUCCINATE 50 MG/1
50 TABLET, EXTENDED RELEASE ORAL DAILY
Status: DISCONTINUED | OUTPATIENT
Start: 2022-01-01 | End: 2022-01-01 | Stop reason: HOSPADM

## 2022-01-01 RX ORDER — POTASSIUM CHLORIDE 7.45 MG/ML
10 INJECTION INTRAVENOUS
Status: DISCONTINUED | OUTPATIENT
Start: 2022-01-01 | End: 2022-01-01 | Stop reason: HOSPADM

## 2022-01-01 RX ORDER — CEFTRIAXONE 1 G/50ML
1 INJECTION, SOLUTION INTRAVENOUS EVERY 24 HOURS
Status: DISCONTINUED | OUTPATIENT
Start: 2022-01-01 | End: 2022-01-01

## 2022-01-01 RX ORDER — MIDODRINE HYDROCHLORIDE 5 MG/1
2.5 TABLET ORAL
Status: DISCONTINUED | OUTPATIENT
Start: 2022-01-01 | End: 2022-01-01 | Stop reason: HOSPADM

## 2022-01-01 RX ORDER — SODIUM CHLORIDE 9 MG/ML
250 INJECTION, SOLUTION INTRAVENOUS AS NEEDED
Status: CANCELLED | OUTPATIENT
Start: 2022-01-01

## 2022-01-01 RX ORDER — CEFDINIR 300 MG/1
300 CAPSULE ORAL EVERY 12 HOURS SCHEDULED
Qty: 1 CAPSULE | Refills: 0
Start: 2022-01-01 | End: 2022-01-01

## 2022-01-01 RX ORDER — IPRATROPIUM BROMIDE AND ALBUTEROL SULFATE 2.5; .5 MG/3ML; MG/3ML
3 SOLUTION RESPIRATORY (INHALATION) EVERY 4 HOURS PRN
Status: DISCONTINUED | OUTPATIENT
Start: 2022-01-01 | End: 2022-01-01 | Stop reason: HOSPADM

## 2022-01-01 RX ORDER — HYDROCODONE BITARTRATE AND ACETAMINOPHEN 5; 325 MG/1; MG/1
1 TABLET ORAL EVERY 6 HOURS PRN
Qty: 6 TABLET | Refills: 0 | Status: SHIPPED | OUTPATIENT
Start: 2022-01-01

## 2022-01-01 RX ORDER — ANORECTAL OINTMENT 15.7; .44; 24; 20.6 G/100G; G/100G; G/100G; G/100G
1 OINTMENT TOPICAL 2 TIMES DAILY
COMMUNITY

## 2022-01-01 RX ORDER — BENZONATATE 100 MG/1
200 CAPSULE ORAL 3 TIMES DAILY PRN
Status: DISCONTINUED | OUTPATIENT
Start: 2022-01-01 | End: 2022-01-01

## 2022-01-01 RX ORDER — ACETAMINOPHEN 325 MG/1
650 TABLET ORAL ONCE
Status: COMPLETED | OUTPATIENT
Start: 2022-01-01 | End: 2022-01-01

## 2022-01-01 RX ORDER — PROCHLORPERAZINE EDISYLATE 5 MG/ML
10 INJECTION INTRAMUSCULAR; INTRAVENOUS EVERY 6 HOURS PRN
Status: DISCONTINUED | OUTPATIENT
Start: 2022-01-01 | End: 2022-01-01 | Stop reason: HOSPADM

## 2022-01-01 RX ORDER — NICOTINE POLACRILEX 4 MG
15 LOZENGE BUCCAL
Status: DISCONTINUED | OUTPATIENT
Start: 2022-01-01 | End: 2022-01-01 | Stop reason: HOSPADM

## 2022-01-01 RX ORDER — BISACODYL 5 MG/1
5 TABLET, DELAYED RELEASE ORAL DAILY PRN
COMMUNITY
End: 2022-01-01 | Stop reason: HOSPADM

## 2022-01-01 RX ORDER — POLYETHYLENE GLYCOL 3350 17 G/17G
17 POWDER, FOR SOLUTION ORAL 2 TIMES DAILY PRN
Status: DISCONTINUED | OUTPATIENT
Start: 2022-01-01 | End: 2022-01-01 | Stop reason: HOSPADM

## 2022-01-01 RX ORDER — CEFTRIAXONE 2 G/50ML
2 INJECTION, SOLUTION INTRAVENOUS EVERY 24 HOURS
Status: DISCONTINUED | OUTPATIENT
Start: 2022-01-01 | End: 2022-01-01

## 2022-01-01 RX ORDER — ONDANSETRON 2 MG/ML
INJECTION INTRAMUSCULAR; INTRAVENOUS
Status: COMPLETED
Start: 2022-01-01 | End: 2022-01-01

## 2022-01-01 RX ORDER — ACETAMINOPHEN 160 MG/5ML
650 SOLUTION ORAL ONCE
Status: COMPLETED | OUTPATIENT
Start: 2022-01-01 | End: 2022-01-01

## 2022-01-01 RX ORDER — CHOLECALCIFEROL (VITAMIN D3) 125 MCG
5 CAPSULE ORAL NIGHTLY PRN
Status: DISCONTINUED | OUTPATIENT
Start: 2022-01-01 | End: 2022-01-01 | Stop reason: HOSPADM

## 2022-01-01 RX ORDER — SACCHAROMYCES BOULARDII 250 MG
250 CAPSULE ORAL 2 TIMES DAILY
Status: DISCONTINUED | OUTPATIENT
Start: 2022-01-01 | End: 2022-01-01 | Stop reason: HOSPADM

## 2022-01-01 RX ORDER — POTASSIUM CHLORIDE 1.5 G/1.77G
40 POWDER, FOR SOLUTION ORAL AS NEEDED
Status: DISCONTINUED | OUTPATIENT
Start: 2022-01-01 | End: 2022-01-01 | Stop reason: HOSPADM

## 2022-01-01 RX ORDER — PROMETHAZINE HYDROCHLORIDE 25 MG/ML
25 INJECTION, SOLUTION INTRAMUSCULAR; INTRAVENOUS EVERY 6 HOURS PRN
COMMUNITY
End: 2022-01-01 | Stop reason: HOSPADM

## 2022-01-01 RX ORDER — GUAIFENESIN/DEXTROMETHORPHAN 100-10MG/5
5 SYRUP ORAL EVERY 4 HOURS PRN
Start: 2022-01-01

## 2022-01-01 RX ORDER — ONDANSETRON 2 MG/ML
4 INJECTION INTRAMUSCULAR; INTRAVENOUS EVERY 6 HOURS PRN
Status: DISCONTINUED | OUTPATIENT
Start: 2022-01-01 | End: 2022-01-01

## 2022-01-01 RX ORDER — FENTANYL 25 UG/H
1 PATCH TRANSDERMAL
Qty: 3 EACH | Refills: 0 | Status: SHIPPED | OUTPATIENT
Start: 2022-01-01

## 2022-01-01 RX ORDER — POTASSIUM CHLORIDE 20 MEQ/1
40 TABLET, EXTENDED RELEASE ORAL AS NEEDED
Status: DISCONTINUED | OUTPATIENT
Start: 2022-01-01 | End: 2022-01-01 | Stop reason: HOSPADM

## 2022-01-01 RX ORDER — ONDANSETRON 2 MG/ML
8 INJECTION INTRAMUSCULAR; INTRAVENOUS EVERY 6 HOURS PRN
Status: DISCONTINUED | OUTPATIENT
Start: 2022-01-01 | End: 2022-01-01 | Stop reason: HOSPADM

## 2022-01-01 RX ORDER — BISACODYL 10 MG
10 SUPPOSITORY, RECTAL RECTAL DAILY PRN
Status: DISCONTINUED | OUTPATIENT
Start: 2022-01-01 | End: 2022-01-01 | Stop reason: HOSPADM

## 2022-01-01 RX ORDER — BENZONATATE 100 MG/1
100 CAPSULE ORAL EVERY 8 HOURS PRN
Status: DISCONTINUED | OUTPATIENT
Start: 2022-01-01 | End: 2022-01-01 | Stop reason: HOSPADM

## 2022-01-01 RX ORDER — FENTANYL CITRATE 50 UG/ML
25 INJECTION, SOLUTION INTRAMUSCULAR; INTRAVENOUS
Status: DISCONTINUED | OUTPATIENT
Start: 2022-01-01 | End: 2022-01-01 | Stop reason: HOSPADM

## 2022-01-01 RX ORDER — CHOLECALCIFEROL (VITAMIN D3) 125 MCG
5 CAPSULE ORAL NIGHTLY PRN
Status: DISCONTINUED | OUTPATIENT
Start: 2022-01-01 | End: 2022-01-01 | Stop reason: SDUPTHER

## 2022-01-01 RX ORDER — METRONIDAZOLE 500 MG/1
500 TABLET ORAL EVERY 8 HOURS SCHEDULED
Status: DISCONTINUED | OUTPATIENT
Start: 2022-01-01 | End: 2022-01-01

## 2022-01-01 RX ORDER — ACETAMINOPHEN 325 MG/1
650 TABLET ORAL ONCE
Status: CANCELLED | OUTPATIENT
Start: 2022-01-01 | End: 2022-01-01

## 2022-01-01 RX ORDER — CEFTRIAXONE 2 G/50ML
2 INJECTION, SOLUTION INTRAVENOUS ONCE
Status: COMPLETED | OUTPATIENT
Start: 2022-01-01 | End: 2022-01-01

## 2022-01-01 RX ORDER — MAGNESIUM SULFATE HEPTAHYDRATE 40 MG/ML
2 INJECTION, SOLUTION INTRAVENOUS ONCE
Status: CANCELLED | OUTPATIENT
Start: 2022-01-01

## 2022-01-01 RX ORDER — CEFEPIME HYDROCHLORIDE 2 G/50ML
2 INJECTION, SOLUTION INTRAVENOUS ONCE
Status: DISCONTINUED | OUTPATIENT
Start: 2022-01-01 | End: 2022-01-01 | Stop reason: SDUPTHER

## 2022-01-01 RX ORDER — AMOXICILLIN 250 MG
2 CAPSULE ORAL NIGHTLY
Status: DISCONTINUED | OUTPATIENT
Start: 2022-01-01 | End: 2022-01-01 | Stop reason: HOSPADM

## 2022-01-01 RX ORDER — CEFDINIR 300 MG/1
300 CAPSULE ORAL EVERY 12 HOURS SCHEDULED
Status: DISCONTINUED | OUTPATIENT
Start: 2022-01-01 | End: 2022-01-01 | Stop reason: HOSPADM

## 2022-01-01 RX ORDER — ALBUTEROL SULFATE 90 UG/1
1 AEROSOL, METERED RESPIRATORY (INHALATION) EVERY 4 HOURS PRN
Status: DISCONTINUED | OUTPATIENT
Start: 2022-01-01 | End: 2022-01-01

## 2022-01-01 RX ORDER — FUROSEMIDE 10 MG/ML
INJECTION INTRAMUSCULAR; INTRAVENOUS
Status: COMPLETED
Start: 2022-01-01 | End: 2022-01-01

## 2022-01-01 RX ORDER — GUAIFENESIN/DEXTROMETHORPHAN 100-10MG/5
5 SYRUP ORAL EVERY 4 HOURS PRN
Status: DISCONTINUED | OUTPATIENT
Start: 2022-01-01 | End: 2022-01-01 | Stop reason: HOSPADM

## 2022-01-01 RX ORDER — DIPHENHYDRAMINE HCL 25 MG
25 CAPSULE ORAL ONCE
Status: CANCELLED | OUTPATIENT
Start: 2022-01-01 | End: 2022-01-01

## 2022-01-01 RX ORDER — ONDANSETRON 4 MG/1
8 TABLET, FILM COATED ORAL EVERY 6 HOURS PRN
Status: DISCONTINUED | OUTPATIENT
Start: 2022-01-01 | End: 2022-01-01 | Stop reason: HOSPADM

## 2022-01-01 RX ORDER — SACCHAROMYCES BOULARDII 250 MG
250 CAPSULE ORAL 2 TIMES DAILY
COMMUNITY
End: 2022-01-01 | Stop reason: HOSPADM

## 2022-01-01 RX ORDER — SODIUM CHLORIDE 9 MG/ML
100 INJECTION, SOLUTION INTRAVENOUS CONTINUOUS
Status: DISCONTINUED | OUTPATIENT
Start: 2022-01-01 | End: 2022-01-01 | Stop reason: HOSPADM

## 2022-01-01 RX ORDER — METAXALONE 800 MG/1
400 TABLET ORAL EVERY 8 HOURS PRN
Status: DISCONTINUED | OUTPATIENT
Start: 2022-01-01 | End: 2022-01-01 | Stop reason: HOSPADM

## 2022-01-01 RX ORDER — LORAZEPAM 1 MG/1
1 TABLET ORAL EVERY 6 HOURS PRN
Qty: 10 TABLET | Refills: 0 | Status: SHIPPED | OUTPATIENT
Start: 2022-01-01

## 2022-01-01 RX ORDER — ACETAMINOPHEN 650 MG/1
650 SUPPOSITORY RECTAL ONCE
Status: COMPLETED | OUTPATIENT
Start: 2022-01-01 | End: 2022-01-01

## 2022-01-01 RX ORDER — PROCHLORPERAZINE MALEATE 10 MG
10 TABLET ORAL EVERY 6 HOURS PRN
Start: 2022-01-01

## 2022-01-01 RX ADMIN — APIXABAN 5 MG: 2.5 TABLET, FILM COATED ORAL at 09:20

## 2022-01-01 RX ADMIN — SODIUM CHLORIDE, PRESERVATIVE FREE 10 ML: 5 INJECTION INTRAVENOUS at 08:16

## 2022-01-01 RX ADMIN — ONDANSETRON 4 MG: 2 INJECTION INTRAMUSCULAR; INTRAVENOUS at 20:25

## 2022-01-01 RX ADMIN — DIPHENHYDRAMINE HYDROCHLORIDE 25 MG: 25 CAPSULE ORAL at 09:59

## 2022-01-01 RX ADMIN — LEVOTHYROXINE SODIUM 88 MCG: 88 TABLET ORAL at 09:29

## 2022-01-01 RX ADMIN — METRONIDAZOLE 500 MG: 500 INJECTION, SOLUTION INTRAVENOUS at 20:38

## 2022-01-01 RX ADMIN — FUROSEMIDE 20 MG: 10 INJECTION INTRAMUSCULAR; INTRAVENOUS at 17:55

## 2022-01-01 RX ADMIN — IPRATROPIUM BROMIDE AND ALBUTEROL SULFATE 3 ML: .5; 2.5 SOLUTION RESPIRATORY (INHALATION) at 16:06

## 2022-01-01 RX ADMIN — CEFDINIR 300 MG: 300 CAPSULE ORAL at 00:33

## 2022-01-01 RX ADMIN — MIDODRINE HYDROCHLORIDE 2.5 MG: 5 TABLET ORAL at 08:22

## 2022-01-01 RX ADMIN — SODIUM CHLORIDE, PRESERVATIVE FREE 10 ML: 5 INJECTION INTRAVENOUS at 09:38

## 2022-01-01 RX ADMIN — METRONIDAZOLE 500 MG: 500 INJECTION, SOLUTION INTRAVENOUS at 22:10

## 2022-01-01 RX ADMIN — CEFDINIR 300 MG: 300 CAPSULE ORAL at 08:31

## 2022-01-01 RX ADMIN — DESVENLAFAXINE SUCCINATE 50 MG: 50 TABLET, EXTENDED RELEASE ORAL at 08:31

## 2022-01-01 RX ADMIN — MIDODRINE HYDROCHLORIDE 2.5 MG: 5 TABLET ORAL at 12:26

## 2022-01-01 RX ADMIN — PANTOPRAZOLE SODIUM 40 MG: 40 TABLET, DELAYED RELEASE ORAL at 06:31

## 2022-01-01 RX ADMIN — ACETAMINOPHEN 650 MG: 325 TABLET ORAL at 10:42

## 2022-01-01 RX ADMIN — Medication 250 MG: at 09:29

## 2022-01-01 RX ADMIN — SODIUM CHLORIDE 40 ML: 9 INJECTION, SOLUTION INTRAVENOUS at 18:21

## 2022-01-01 RX ADMIN — GABAPENTIN 100 MG: 100 CAPSULE ORAL at 20:26

## 2022-01-01 RX ADMIN — APIXABAN 5 MG: 2.5 TABLET, FILM COATED ORAL at 22:18

## 2022-01-01 RX ADMIN — CEFDINIR 300 MG: 300 CAPSULE ORAL at 20:01

## 2022-01-01 RX ADMIN — MIDODRINE HYDROCHLORIDE 2.5 MG: 5 TABLET ORAL at 08:30

## 2022-01-01 RX ADMIN — GUAIFENESIN SYRUP AND DEXTROMETHORPHAN 5 ML: 100; 10 SYRUP ORAL at 13:24

## 2022-01-01 RX ADMIN — SODIUM CHLORIDE, PRESERVATIVE FREE 10 ML: 5 INJECTION INTRAVENOUS at 08:31

## 2022-01-01 RX ADMIN — ATORVASTATIN CALCIUM 10 MG: 10 TABLET, FILM COATED ORAL at 20:26

## 2022-01-01 RX ADMIN — INSULIN DETEMIR 10 UNITS: 100 INJECTION, SOLUTION SUBCUTANEOUS at 20:26

## 2022-01-01 RX ADMIN — MIDODRINE HYDROCHLORIDE 2.5 MG: 5 TABLET ORAL at 17:04

## 2022-01-01 RX ADMIN — GABAPENTIN 100 MG: 100 CAPSULE ORAL at 15:11

## 2022-01-01 RX ADMIN — PROMETHAZINE HYDROCHLORIDE 6.25 MG: 12.5 TABLET ORAL at 17:29

## 2022-01-01 RX ADMIN — PROMETHAZINE HYDROCHLORIDE 6.25 MG: 12.5 TABLET ORAL at 00:08

## 2022-01-01 RX ADMIN — LEVOTHYROXINE SODIUM 88 MCG: 88 TABLET ORAL at 08:49

## 2022-01-01 RX ADMIN — PANTOPRAZOLE SODIUM 40 MG: 40 TABLET, DELAYED RELEASE ORAL at 08:49

## 2022-01-01 RX ADMIN — ONDANSETRON 4 MG: 2 INJECTION INTRAMUSCULAR; INTRAVENOUS at 22:25

## 2022-01-01 RX ADMIN — IPRATROPIUM BROMIDE AND ALBUTEROL SULFATE 3 ML: .5; 2.5 SOLUTION RESPIRATORY (INHALATION) at 07:41

## 2022-01-01 RX ADMIN — MAGNESIUM SULFATE HEPTAHYDRATE 2 G: 40 INJECTION, SOLUTION INTRAVENOUS at 06:24

## 2022-01-01 RX ADMIN — SODIUM CHLORIDE, PRESERVATIVE FREE 10 ML: 5 INJECTION INTRAVENOUS at 21:36

## 2022-01-01 RX ADMIN — METRONIDAZOLE 500 MG: 500 INJECTION, SOLUTION INTRAVENOUS at 06:32

## 2022-01-01 RX ADMIN — ONDANSETRON HYDROCHLORIDE 4 MG: 4 TABLET, FILM COATED ORAL at 17:33

## 2022-01-01 RX ADMIN — TAMSULOSIN HYDROCHLORIDE 0.4 MG: 0.4 CAPSULE ORAL at 20:26

## 2022-01-01 RX ADMIN — GABAPENTIN 100 MG: 100 CAPSULE ORAL at 20:14

## 2022-01-01 RX ADMIN — ERYTHROMYCIN LACTOBIONATE 250 MG: 500 INJECTION, POWDER, LYOPHILIZED, FOR SOLUTION INTRAVENOUS at 16:45

## 2022-01-01 RX ADMIN — DESVENLAFAXINE SUCCINATE 50 MG: 50 TABLET, EXTENDED RELEASE ORAL at 08:41

## 2022-01-01 RX ADMIN — TAMSULOSIN HYDROCHLORIDE 0.4 MG: 0.4 CAPSULE ORAL at 20:14

## 2022-01-01 RX ADMIN — MIDODRINE HYDROCHLORIDE 2.5 MG: 5 TABLET ORAL at 08:32

## 2022-01-01 RX ADMIN — ONDANSETRON HYDROCHLORIDE 4 MG: 4 TABLET, FILM COATED ORAL at 20:01

## 2022-01-01 RX ADMIN — ATORVASTATIN CALCIUM 10 MG: 10 TABLET, FILM COATED ORAL at 22:18

## 2022-01-01 RX ADMIN — PANTOPRAZOLE SODIUM 40 MG: 40 TABLET, DELAYED RELEASE ORAL at 14:23

## 2022-01-01 RX ADMIN — GABAPENTIN 100 MG: 100 CAPSULE ORAL at 22:18

## 2022-01-01 RX ADMIN — PROCHLORPERAZINE EDISYLATE 10 MG: 5 INJECTION INTRAMUSCULAR; INTRAVENOUS at 13:32

## 2022-01-01 RX ADMIN — CEFEPIME HYDROCHLORIDE 2 G: 2 INJECTION, SOLUTION INTRAVENOUS at 02:13

## 2022-01-01 RX ADMIN — SODIUM CHLORIDE 40 ML: 9 INJECTION, SOLUTION INTRAVENOUS at 13:15

## 2022-01-01 RX ADMIN — SODIUM CHLORIDE, PRESERVATIVE FREE 10 ML: 5 INJECTION INTRAVENOUS at 21:29

## 2022-01-01 RX ADMIN — DESVENLAFAXINE SUCCINATE 50 MG: 50 TABLET, EXTENDED RELEASE ORAL at 08:15

## 2022-01-01 RX ADMIN — LEVOTHYROXINE SODIUM 88 MCG: 88 TABLET ORAL at 08:22

## 2022-01-01 RX ADMIN — SODIUM CHLORIDE, PRESERVATIVE FREE 10 ML: 5 INJECTION INTRAVENOUS at 20:59

## 2022-01-01 RX ADMIN — Medication 250 MG: at 22:16

## 2022-01-01 RX ADMIN — GABAPENTIN 100 MG: 100 CAPSULE ORAL at 08:49

## 2022-01-01 RX ADMIN — TAMSULOSIN HYDROCHLORIDE 0.4 MG: 0.4 CAPSULE ORAL at 22:18

## 2022-01-01 RX ADMIN — AMIODARONE HYDROCHLORIDE 200 MG: 200 TABLET ORAL at 08:42

## 2022-01-01 RX ADMIN — METRONIDAZOLE 500 MG: 500 INJECTION, SOLUTION INTRAVENOUS at 06:36

## 2022-01-01 RX ADMIN — DEXTROSE MONOHYDRATE 25 G: 500 INJECTION PARENTERAL at 06:40

## 2022-01-01 RX ADMIN — LEVOTHYROXINE SODIUM 88 MCG: 88 TABLET ORAL at 14:23

## 2022-01-01 RX ADMIN — METRONIDAZOLE 500 MG: 500 INJECTION, SOLUTION INTRAVENOUS at 09:17

## 2022-01-01 RX ADMIN — GABAPENTIN 100 MG: 100 CAPSULE ORAL at 17:17

## 2022-01-01 RX ADMIN — INSULIN ASPART 2 UNITS: 100 INJECTION, SOLUTION INTRAVENOUS; SUBCUTANEOUS at 08:32

## 2022-01-01 RX ADMIN — POTASSIUM CHLORIDE 10 MEQ: 10 INJECTION, SOLUTION INTRAVENOUS at 11:09

## 2022-01-01 RX ADMIN — INSULIN ASPART 2 UNITS: 100 INJECTION, SOLUTION INTRAVENOUS; SUBCUTANEOUS at 08:22

## 2022-01-01 RX ADMIN — INSULIN DETEMIR 10 UNITS: 100 INJECTION, SOLUTION SUBCUTANEOUS at 21:28

## 2022-01-01 RX ADMIN — ONDANSETRON 4 MG: 2 INJECTION INTRAMUSCULAR; INTRAVENOUS at 06:07

## 2022-01-01 RX ADMIN — DESVENLAFAXINE SUCCINATE 50 MG: 50 TABLET, EXTENDED RELEASE ORAL at 09:21

## 2022-01-01 RX ADMIN — GUAIFENESIN SYRUP AND DEXTROMETHORPHAN 5 ML: 100; 10 SYRUP ORAL at 16:29

## 2022-01-01 RX ADMIN — GUAIFENESIN SYRUP AND DEXTROMETHORPHAN 5 ML: 100; 10 SYRUP ORAL at 03:19

## 2022-01-01 RX ADMIN — INSULIN ASPART 3 UNITS: 100 INJECTION, SOLUTION INTRAVENOUS; SUBCUTANEOUS at 12:22

## 2022-01-01 RX ADMIN — HYDROCODONE BITARTRATE AND ACETAMINOPHEN 1 TABLET: 5; 325 TABLET ORAL at 20:02

## 2022-01-01 RX ADMIN — METRONIDAZOLE 500 MG: 500 INJECTION, SOLUTION INTRAVENOUS at 09:29

## 2022-01-01 RX ADMIN — MIDODRINE HYDROCHLORIDE 2.5 MG: 5 TABLET ORAL at 17:29

## 2022-01-01 RX ADMIN — IPRATROPIUM BROMIDE AND ALBUTEROL SULFATE 3 ML: .5; 2.5 SOLUTION RESPIRATORY (INHALATION) at 12:55

## 2022-01-01 RX ADMIN — PANTOPRAZOLE SODIUM 40 MG: 40 TABLET, DELAYED RELEASE ORAL at 06:00

## 2022-01-01 RX ADMIN — IPRATROPIUM BROMIDE AND ALBUTEROL SULFATE 3 ML: .5; 2.5 SOLUTION RESPIRATORY (INHALATION) at 19:49

## 2022-01-01 RX ADMIN — SODIUM CHLORIDE, PRESERVATIVE FREE 10 ML: 5 INJECTION INTRAVENOUS at 10:30

## 2022-01-01 RX ADMIN — SODIUM CHLORIDE, PRESERVATIVE FREE 10 ML: 5 INJECTION INTRAVENOUS at 23:11

## 2022-01-01 RX ADMIN — PANTOPRAZOLE SODIUM 40 MG: 40 TABLET, DELAYED RELEASE ORAL at 06:05

## 2022-01-01 RX ADMIN — IPRATROPIUM BROMIDE AND ALBUTEROL SULFATE 3 ML: .5; 2.5 SOLUTION RESPIRATORY (INHALATION) at 11:10

## 2022-01-01 RX ADMIN — POTASSIUM CHLORIDE 10 MEQ: 10 INJECTION, SOLUTION INTRAVENOUS at 07:48

## 2022-01-01 RX ADMIN — PANTOPRAZOLE SODIUM 40 MG: 40 TABLET, DELAYED RELEASE ORAL at 06:14

## 2022-01-01 RX ADMIN — SODIUM CHLORIDE, PRESERVATIVE FREE 10 ML: 5 INJECTION INTRAVENOUS at 22:39

## 2022-01-01 RX ADMIN — INSULIN ASPART 2 UNITS: 100 INJECTION, SOLUTION INTRAVENOUS; SUBCUTANEOUS at 17:29

## 2022-01-01 RX ADMIN — INSULIN ASPART 2 UNITS: 100 INJECTION, SOLUTION INTRAVENOUS; SUBCUTANEOUS at 12:37

## 2022-01-01 RX ADMIN — APIXABAN 5 MG: 2.5 TABLET, FILM COATED ORAL at 08:30

## 2022-01-01 RX ADMIN — IPRATROPIUM BROMIDE AND ALBUTEROL SULFATE 3 ML: .5; 2.5 SOLUTION RESPIRATORY (INHALATION) at 07:16

## 2022-01-01 RX ADMIN — GABAPENTIN 100 MG: 100 CAPSULE ORAL at 22:16

## 2022-01-01 RX ADMIN — PROMETHAZINE HYDROCHLORIDE 6.25 MG: 12.5 TABLET ORAL at 15:47

## 2022-01-01 RX ADMIN — DESVENLAFAXINE SUCCINATE 50 MG: 50 TABLET, EXTENDED RELEASE ORAL at 09:29

## 2022-01-01 RX ADMIN — IPRATROPIUM BROMIDE AND ALBUTEROL SULFATE 3 ML: .5; 2.5 SOLUTION RESPIRATORY (INHALATION) at 19:51

## 2022-01-01 RX ADMIN — GABAPENTIN 100 MG: 100 CAPSULE ORAL at 09:21

## 2022-01-01 RX ADMIN — LEVOTHYROXINE SODIUM 88 MCG: 88 TABLET ORAL at 08:32

## 2022-01-01 RX ADMIN — GABAPENTIN 100 MG: 100 CAPSULE ORAL at 17:28

## 2022-01-01 RX ADMIN — SODIUM CHLORIDE, PRESERVATIVE FREE 10 ML: 5 INJECTION INTRAVENOUS at 08:51

## 2022-01-01 RX ADMIN — MIDODRINE HYDROCHLORIDE 2.5 MG: 5 TABLET ORAL at 12:44

## 2022-01-01 RX ADMIN — IPRATROPIUM BROMIDE AND ALBUTEROL SULFATE 3 ML: .5; 2.5 SOLUTION RESPIRATORY (INHALATION) at 20:47

## 2022-01-01 RX ADMIN — AMIODARONE HYDROCHLORIDE 200 MG: 200 TABLET ORAL at 09:29

## 2022-01-01 RX ADMIN — GABAPENTIN 100 MG: 100 CAPSULE ORAL at 20:01

## 2022-01-01 RX ADMIN — CEFDINIR 300 MG: 300 CAPSULE ORAL at 08:37

## 2022-01-01 RX ADMIN — METRONIDAZOLE 500 MG: 500 INJECTION, SOLUTION INTRAVENOUS at 21:36

## 2022-01-01 RX ADMIN — ALBUTEROL SULFATE 2.5 MG: 2.5 SOLUTION RESPIRATORY (INHALATION) at 04:36

## 2022-01-01 RX ADMIN — LEVOTHYROXINE SODIUM 88 MCG: 88 TABLET ORAL at 08:41

## 2022-01-01 RX ADMIN — PROMETHAZINE HYDROCHLORIDE 6.25 MG: 12.5 TABLET ORAL at 03:52

## 2022-01-01 RX ADMIN — ATORVASTATIN CALCIUM 10 MG: 10 TABLET, FILM COATED ORAL at 21:36

## 2022-01-01 RX ADMIN — MIDODRINE HYDROCHLORIDE 2.5 MG: 5 TABLET ORAL at 18:00

## 2022-01-01 RX ADMIN — IPRATROPIUM BROMIDE AND ALBUTEROL SULFATE 3 ML: .5; 2.5 SOLUTION RESPIRATORY (INHALATION) at 11:47

## 2022-01-01 RX ADMIN — ONDANSETRON HYDROCHLORIDE 4 MG: 4 TABLET, FILM COATED ORAL at 18:47

## 2022-01-01 RX ADMIN — PANTOPRAZOLE SODIUM 40 MG: 40 TABLET, DELAYED RELEASE ORAL at 06:23

## 2022-01-01 RX ADMIN — ATORVASTATIN CALCIUM 10 MG: 10 TABLET, FILM COATED ORAL at 20:14

## 2022-01-01 RX ADMIN — CEFEPIME HYDROCHLORIDE 2 G: 2 INJECTION, SOLUTION INTRAVENOUS at 15:28

## 2022-01-01 RX ADMIN — GUAIFENESIN SYRUP AND DEXTROMETHORPHAN 5 ML: 100; 10 SYRUP ORAL at 22:36

## 2022-01-01 RX ADMIN — IPRATROPIUM BROMIDE AND ALBUTEROL SULFATE 3 ML: .5; 2.5 SOLUTION RESPIRATORY (INHALATION) at 11:18

## 2022-01-01 RX ADMIN — GABAPENTIN 100 MG: 100 CAPSULE ORAL at 08:31

## 2022-01-01 RX ADMIN — ONDANSETRON 4 MG: 2 INJECTION INTRAMUSCULAR; INTRAVENOUS at 06:55

## 2022-01-01 RX ADMIN — MIDODRINE HYDROCHLORIDE 2.5 MG: 5 TABLET ORAL at 12:40

## 2022-01-01 RX ADMIN — SENNOSIDES AND DOCUSATE SODIUM 2 TABLET: 50; 8.6 TABLET ORAL at 20:59

## 2022-01-01 RX ADMIN — GUAIFENESIN SYRUP AND DEXTROMETHORPHAN 5 ML: 100; 10 SYRUP ORAL at 09:15

## 2022-01-01 RX ADMIN — APIXABAN 5 MG: 2.5 TABLET, FILM COATED ORAL at 20:14

## 2022-01-01 RX ADMIN — ERYTHROMYCIN LACTOBIONATE 250 MG: 500 INJECTION, POWDER, LYOPHILIZED, FOR SOLUTION INTRAVENOUS at 11:07

## 2022-01-01 RX ADMIN — SODIUM CHLORIDE, PRESERVATIVE FREE 10 ML: 5 INJECTION INTRAVENOUS at 20:03

## 2022-01-01 RX ADMIN — AMIODARONE HYDROCHLORIDE 200 MG: 200 TABLET ORAL at 08:30

## 2022-01-01 RX ADMIN — NYSTATIN: 100000 POWDER TOPICAL at 20:10

## 2022-01-01 RX ADMIN — GABAPENTIN 100 MG: 100 CAPSULE ORAL at 16:39

## 2022-01-01 RX ADMIN — NYSTATIN: 100000 POWDER TOPICAL at 09:27

## 2022-01-01 RX ADMIN — HYDROCODONE BITARTRATE AND ACETAMINOPHEN 1 TABLET: 5; 325 TABLET ORAL at 06:04

## 2022-01-01 RX ADMIN — ATORVASTATIN CALCIUM 10 MG: 10 TABLET, FILM COATED ORAL at 20:01

## 2022-01-01 RX ADMIN — IPRATROPIUM BROMIDE AND ALBUTEROL SULFATE 3 ML: .5; 2.5 SOLUTION RESPIRATORY (INHALATION) at 07:56

## 2022-01-01 RX ADMIN — TAMSULOSIN HYDROCHLORIDE 0.4 MG: 0.4 CAPSULE ORAL at 22:16

## 2022-01-01 RX ADMIN — SODIUM CHLORIDE 125 ML/HR: 9 INJECTION, SOLUTION INTRAVENOUS at 23:11

## 2022-01-01 RX ADMIN — TAMSULOSIN HYDROCHLORIDE 0.4 MG: 0.4 CAPSULE ORAL at 20:01

## 2022-01-01 RX ADMIN — HYDROCODONE BITARTRATE AND ACETAMINOPHEN 1 TABLET: 5; 325 TABLET ORAL at 20:14

## 2022-01-01 RX ADMIN — HYDROCODONE BITARTRATE AND ACETAMINOPHEN 1 TABLET: 5; 325 TABLET ORAL at 00:01

## 2022-01-01 RX ADMIN — ONDANSETRON 4 MG: 2 INJECTION INTRAMUSCULAR; INTRAVENOUS at 02:03

## 2022-01-01 RX ADMIN — SENNOSIDES AND DOCUSATE SODIUM 2 TABLET: 50; 8.6 TABLET ORAL at 21:28

## 2022-01-01 RX ADMIN — LEVOTHYROXINE SODIUM 88 MCG: 88 TABLET ORAL at 08:15

## 2022-01-01 RX ADMIN — HYDROCODONE BITARTRATE AND ACETAMINOPHEN 1 TABLET: 5; 325 TABLET ORAL at 21:36

## 2022-01-01 RX ADMIN — FILGRASTIM 300 MCG: 300 INJECTION, SOLUTION INTRAVENOUS; SUBCUTANEOUS at 12:02

## 2022-01-01 RX ADMIN — ACETAMINOPHEN 650 MG: 325 TABLET ORAL at 20:01

## 2022-01-01 RX ADMIN — CEFEPIME HYDROCHLORIDE 2 G: 2 INJECTION, SOLUTION INTRAVENOUS at 02:37

## 2022-01-01 RX ADMIN — APIXABAN 5 MG: 2.5 TABLET, FILM COATED ORAL at 08:15

## 2022-01-01 RX ADMIN — PROCHLORPERAZINE EDISYLATE 10 MG: 5 INJECTION INTRAMUSCULAR; INTRAVENOUS at 09:39

## 2022-01-01 RX ADMIN — IPRATROPIUM BROMIDE AND ALBUTEROL SULFATE 3 ML: .5; 2.5 SOLUTION RESPIRATORY (INHALATION) at 07:22

## 2022-01-01 RX ADMIN — IPRATROPIUM BROMIDE AND ALBUTEROL SULFATE 3 ML: .5; 2.5 SOLUTION RESPIRATORY (INHALATION) at 16:07

## 2022-01-01 RX ADMIN — FILGRASTIM 300 MCG: 300 INJECTION, SOLUTION INTRAVENOUS; SUBCUTANEOUS at 12:44

## 2022-01-01 RX ADMIN — APIXABAN 5 MG: 2.5 TABLET, FILM COATED ORAL at 14:16

## 2022-01-01 RX ADMIN — DESVENLAFAXINE SUCCINATE 50 MG: 50 TABLET, EXTENDED RELEASE ORAL at 08:49

## 2022-01-01 RX ADMIN — SENNOSIDES AND DOCUSATE SODIUM 2 TABLET: 50; 8.6 TABLET ORAL at 22:18

## 2022-01-01 RX ADMIN — IPRATROPIUM BROMIDE AND ALBUTEROL SULFATE 3 ML: .5; 2.5 SOLUTION RESPIRATORY (INHALATION) at 15:45

## 2022-01-01 RX ADMIN — CEFDINIR 300 MG: 300 CAPSULE ORAL at 09:15

## 2022-01-01 RX ADMIN — PANTOPRAZOLE SODIUM 40 MG: 40 TABLET, DELAYED RELEASE ORAL at 08:32

## 2022-01-01 RX ADMIN — MIDODRINE HYDROCHLORIDE 2.5 MG: 5 TABLET ORAL at 13:35

## 2022-01-01 RX ADMIN — SODIUM CHLORIDE, PRESERVATIVE FREE 10 ML: 5 INJECTION INTRAVENOUS at 09:24

## 2022-01-01 RX ADMIN — BENZONATATE 100 MG: 100 CAPSULE ORAL at 05:27

## 2022-01-01 RX ADMIN — GABAPENTIN 100 MG: 100 CAPSULE ORAL at 08:32

## 2022-01-01 RX ADMIN — APIXABAN 5 MG: 2.5 TABLET, FILM COATED ORAL at 20:01

## 2022-01-01 RX ADMIN — APIXABAN 5 MG: 2.5 TABLET, FILM COATED ORAL at 08:22

## 2022-01-01 RX ADMIN — PROCHLORPERAZINE EDISYLATE 10 MG: 5 INJECTION INTRAMUSCULAR; INTRAVENOUS at 20:03

## 2022-01-01 RX ADMIN — METRONIDAZOLE 500 MG: 500 TABLET ORAL at 15:47

## 2022-01-01 RX ADMIN — CEFTRIAXONE 1 G: 1 INJECTION, SOLUTION INTRAVENOUS at 12:53

## 2022-01-01 RX ADMIN — GABAPENTIN 100 MG: 100 CAPSULE ORAL at 21:28

## 2022-01-01 RX ADMIN — TAMSULOSIN HYDROCHLORIDE 0.4 MG: 0.4 CAPSULE ORAL at 21:36

## 2022-01-01 RX ADMIN — ONDANSETRON 8 MG: 2 INJECTION INTRAMUSCULAR; INTRAVENOUS at 04:58

## 2022-01-01 RX ADMIN — MIDODRINE HYDROCHLORIDE 2.5 MG: 5 TABLET ORAL at 08:41

## 2022-01-01 RX ADMIN — ALBUTEROL SULFATE 2.5 MG: 2.5 SOLUTION RESPIRATORY (INHALATION) at 04:43

## 2022-01-01 RX ADMIN — SODIUM CHLORIDE 500 ML: 9 INJECTION, SOLUTION INTRAVENOUS at 04:28

## 2022-01-01 RX ADMIN — ONDANSETRON 8 MG: 2 INJECTION INTRAMUSCULAR; INTRAVENOUS at 20:47

## 2022-01-01 RX ADMIN — APIXABAN 5 MG: 2.5 TABLET, FILM COATED ORAL at 08:49

## 2022-01-01 RX ADMIN — SODIUM CHLORIDE, PRESERVATIVE FREE 10 ML: 5 INJECTION INTRAVENOUS at 22:19

## 2022-01-01 RX ADMIN — MIDODRINE HYDROCHLORIDE 2.5 MG: 5 TABLET ORAL at 08:49

## 2022-01-01 RX ADMIN — METRONIDAZOLE 500 MG: 500 INJECTION, SOLUTION INTRAVENOUS at 16:25

## 2022-01-01 RX ADMIN — DESVENLAFAXINE SUCCINATE 50 MG: 50 TABLET, EXTENDED RELEASE ORAL at 08:30

## 2022-01-01 RX ADMIN — INSULIN ASPART 4 UNITS: 100 INJECTION, SOLUTION INTRAVENOUS; SUBCUTANEOUS at 12:44

## 2022-01-01 RX ADMIN — GABAPENTIN 100 MG: 100 CAPSULE ORAL at 21:36

## 2022-01-01 RX ADMIN — METRONIDAZOLE 500 MG: 500 INJECTION, SOLUTION INTRAVENOUS at 15:28

## 2022-01-01 RX ADMIN — SENNOSIDES AND DOCUSATE SODIUM 2 TABLET: 50; 8.6 TABLET ORAL at 00:33

## 2022-01-01 RX ADMIN — PROMETHAZINE HYDROCHLORIDE 6.25 MG: 12.5 TABLET ORAL at 22:28

## 2022-01-01 RX ADMIN — MIDODRINE HYDROCHLORIDE 2.5 MG: 5 TABLET ORAL at 08:15

## 2022-01-01 RX ADMIN — IPRATROPIUM BROMIDE AND ALBUTEROL SULFATE 3 ML: .5; 2.5 SOLUTION RESPIRATORY (INHALATION) at 12:05

## 2022-01-01 RX ADMIN — FENTANYL CITRATE 25 MCG: 50 INJECTION, SOLUTION INTRAMUSCULAR; INTRAVENOUS at 12:28

## 2022-01-01 RX ADMIN — IPRATROPIUM BROMIDE AND ALBUTEROL SULFATE 3 ML: .5; 2.5 SOLUTION RESPIRATORY (INHALATION) at 08:02

## 2022-01-01 RX ADMIN — SENNOSIDES AND DOCUSATE SODIUM 2 TABLET: 50; 8.6 TABLET ORAL at 20:01

## 2022-01-01 RX ADMIN — POTASSIUM CHLORIDE 10 MEQ: 10 INJECTION, SOLUTION INTRAVENOUS at 06:39

## 2022-01-01 RX ADMIN — MIDODRINE HYDROCHLORIDE 2.5 MG: 5 TABLET ORAL at 12:17

## 2022-01-01 RX ADMIN — CEFEPIME HYDROCHLORIDE 2 G: 2 INJECTION, SOLUTION INTRAVENOUS at 22:37

## 2022-01-01 RX ADMIN — DIPHENHYDRAMINE HYDROCHLORIDE 25 MG: 25 CAPSULE ORAL at 10:42

## 2022-01-01 RX ADMIN — Medication 250 MG: at 08:49

## 2022-01-01 RX ADMIN — DESVENLAFAXINE SUCCINATE 50 MG: 50 TABLET, EXTENDED RELEASE ORAL at 08:22

## 2022-01-01 RX ADMIN — IOPAMIDOL 100 ML: 612 INJECTION, SOLUTION INTRAVENOUS at 05:20

## 2022-01-01 RX ADMIN — DESVENLAFAXINE SUCCINATE 50 MG: 50 TABLET, EXTENDED RELEASE ORAL at 14:20

## 2022-01-01 RX ADMIN — PROMETHAZINE HYDROCHLORIDE 6.25 MG: 12.5 TABLET ORAL at 09:39

## 2022-01-01 RX ADMIN — MIDODRINE HYDROCHLORIDE 2.5 MG: 5 TABLET ORAL at 09:27

## 2022-01-01 RX ADMIN — APIXABAN 5 MG: 2.5 TABLET, FILM COATED ORAL at 20:26

## 2022-01-01 RX ADMIN — GABAPENTIN 100 MG: 100 CAPSULE ORAL at 08:15

## 2022-01-01 RX ADMIN — IPRATROPIUM BROMIDE AND ALBUTEROL SULFATE 3 ML: .5; 2.5 SOLUTION RESPIRATORY (INHALATION) at 07:34

## 2022-01-01 RX ADMIN — AMIODARONE HYDROCHLORIDE 200 MG: 200 TABLET ORAL at 08:31

## 2022-01-01 RX ADMIN — IPRATROPIUM BROMIDE AND ALBUTEROL SULFATE 3 ML: .5; 2.5 SOLUTION RESPIRATORY (INHALATION) at 15:51

## 2022-01-01 RX ADMIN — IPRATROPIUM BROMIDE AND ALBUTEROL SULFATE 3 ML: .5; 2.5 SOLUTION RESPIRATORY (INHALATION) at 16:26

## 2022-01-01 RX ADMIN — IPRATROPIUM BROMIDE AND ALBUTEROL SULFATE 3 ML: .5; 2.5 SOLUTION RESPIRATORY (INHALATION) at 09:11

## 2022-01-01 RX ADMIN — GABAPENTIN 100 MG: 100 CAPSULE ORAL at 17:04

## 2022-01-01 RX ADMIN — SODIUM CHLORIDE 125 ML/HR: 9 INJECTION, SOLUTION INTRAVENOUS at 06:50

## 2022-01-01 RX ADMIN — SODIUM CHLORIDE 250 ML: 9 INJECTION, SOLUTION INTRAVENOUS at 13:00

## 2022-01-01 RX ADMIN — GUAIFENESIN SYRUP AND DEXTROMETHORPHAN 5 ML: 100; 10 SYRUP ORAL at 09:34

## 2022-01-01 RX ADMIN — SODIUM CHLORIDE, PRESERVATIVE FREE 10 ML: 5 INJECTION INTRAVENOUS at 20:14

## 2022-01-01 RX ADMIN — METRONIDAZOLE 500 MG: 500 INJECTION, SOLUTION INTRAVENOUS at 23:05

## 2022-01-01 RX ADMIN — BENZONATATE 200 MG: 100 CAPSULE ORAL at 06:56

## 2022-01-01 RX ADMIN — MIDODRINE HYDROCHLORIDE 2.5 MG: 5 TABLET ORAL at 11:58

## 2022-01-01 RX ADMIN — SODIUM CHLORIDE, PRESERVATIVE FREE 10 ML: 5 INJECTION INTRAVENOUS at 20:32

## 2022-01-01 RX ADMIN — INSULIN ASPART 3 UNITS: 100 INJECTION, SOLUTION INTRAVENOUS; SUBCUTANEOUS at 11:58

## 2022-01-01 RX ADMIN — PROMETHAZINE HYDROCHLORIDE 6.25 MG: 12.5 TABLET ORAL at 21:36

## 2022-01-01 RX ADMIN — APIXABAN 5 MG: 2.5 TABLET, FILM COATED ORAL at 08:31

## 2022-01-01 RX ADMIN — ONDANSETRON 4 MG: 2 INJECTION INTRAMUSCULAR; INTRAVENOUS at 13:35

## 2022-01-01 RX ADMIN — APIXABAN 5 MG: 2.5 TABLET, FILM COATED ORAL at 08:41

## 2022-01-01 RX ADMIN — AMIODARONE HYDROCHLORIDE 200 MG: 200 TABLET ORAL at 09:21

## 2022-01-01 RX ADMIN — GABAPENTIN 100 MG: 100 CAPSULE ORAL at 14:19

## 2022-01-01 RX ADMIN — POTASSIUM CHLORIDE 10 MEQ: 10 INJECTION, SOLUTION INTRAVENOUS at 12:17

## 2022-01-01 RX ADMIN — IPRATROPIUM BROMIDE AND ALBUTEROL SULFATE 3 ML: .5; 2.5 SOLUTION RESPIRATORY (INHALATION) at 20:25

## 2022-01-01 RX ADMIN — AMIODARONE HYDROCHLORIDE 200 MG: 200 TABLET ORAL at 08:49

## 2022-01-01 RX ADMIN — POTASSIUM CHLORIDE 10 MEQ: 10 INJECTION, SOLUTION INTRAVENOUS at 10:08

## 2022-01-01 RX ADMIN — NYSTATIN: 100000 POWDER TOPICAL at 08:53

## 2022-01-01 RX ADMIN — BENZONATATE 100 MG: 100 CAPSULE ORAL at 04:03

## 2022-01-01 RX ADMIN — METRONIDAZOLE 500 MG: 500 INJECTION, SOLUTION INTRAVENOUS at 14:06

## 2022-01-01 RX ADMIN — ONDANSETRON 8 MG: 2 INJECTION INTRAMUSCULAR; INTRAVENOUS at 12:28

## 2022-01-01 RX ADMIN — CEFTRIAXONE 2 G: 2 INJECTION, SOLUTION INTRAVENOUS at 13:36

## 2022-01-01 RX ADMIN — POTASSIUM CHLORIDE 10 MEQ: 10 INJECTION, SOLUTION INTRAVENOUS at 08:58

## 2022-01-01 RX ADMIN — GABAPENTIN 100 MG: 100 CAPSULE ORAL at 08:37

## 2022-01-01 RX ADMIN — LEVOTHYROXINE SODIUM 88 MCG: 88 TABLET ORAL at 08:30

## 2022-01-01 RX ADMIN — APIXABAN 5 MG: 2.5 TABLET, FILM COATED ORAL at 21:36

## 2022-01-01 RX ADMIN — MIDODRINE HYDROCHLORIDE 2.5 MG: 5 TABLET ORAL at 12:22

## 2022-01-01 RX ADMIN — MIDODRINE HYDROCHLORIDE 2.5 MG: 5 TABLET ORAL at 14:22

## 2022-01-01 RX ADMIN — SODIUM CHLORIDE 40 ML: 9 INJECTION, SOLUTION INTRAVENOUS at 12:16

## 2022-01-01 RX ADMIN — ERYTHROMYCIN LACTOBIONATE 250 MG: 500 INJECTION, POWDER, LYOPHILIZED, FOR SOLUTION INTRAVENOUS at 11:04

## 2022-01-01 RX ADMIN — METRONIDAZOLE 500 MG: 500 INJECTION, SOLUTION INTRAVENOUS at 14:02

## 2022-01-01 RX ADMIN — ONDANSETRON HYDROCHLORIDE 4 MG: 4 TABLET, FILM COATED ORAL at 00:33

## 2022-01-01 RX ADMIN — APIXABAN 5 MG: 2.5 TABLET, FILM COATED ORAL at 20:59

## 2022-01-01 RX ADMIN — CEFEPIME HYDROCHLORIDE 2 G: 2 INJECTION, SOLUTION INTRAVENOUS at 15:29

## 2022-01-01 RX ADMIN — GABAPENTIN 100 MG: 100 CAPSULE ORAL at 09:29

## 2022-01-01 RX ADMIN — ONDANSETRON 8 MG: 2 INJECTION INTRAMUSCULAR; INTRAVENOUS at 04:29

## 2022-01-01 RX ADMIN — SODIUM CHLORIDE 100 ML/HR: 9 INJECTION, SOLUTION INTRAVENOUS at 09:27

## 2022-01-01 RX ADMIN — ATORVASTATIN CALCIUM 10 MG: 10 TABLET, FILM COATED ORAL at 22:16

## 2022-01-01 RX ADMIN — ONDANSETRON 4 MG: 2 INJECTION INTRAMUSCULAR; INTRAVENOUS at 08:28

## 2022-01-01 RX ADMIN — SODIUM CHLORIDE, PRESERVATIVE FREE 10 ML: 5 INJECTION INTRAVENOUS at 08:52

## 2022-01-01 RX ADMIN — ONDANSETRON 4 MG: 2 INJECTION INTRAMUSCULAR; INTRAVENOUS at 16:33

## 2022-01-01 RX ADMIN — SODIUM CHLORIDE, PRESERVATIVE FREE 10 ML: 5 INJECTION INTRAVENOUS at 08:32

## 2022-01-01 RX ADMIN — LEVOTHYROXINE SODIUM 88 MCG: 88 TABLET ORAL at 09:21

## 2022-01-01 RX ADMIN — IPRATROPIUM BROMIDE AND ALBUTEROL SULFATE 3 ML: .5; 2.5 SOLUTION RESPIRATORY (INHALATION) at 12:16

## 2022-01-01 RX ADMIN — ONDANSETRON HYDROCHLORIDE 4 MG: 4 TABLET, FILM COATED ORAL at 17:04

## 2022-01-01 RX ADMIN — GABAPENTIN 100 MG: 100 CAPSULE ORAL at 17:29

## 2022-01-01 RX ADMIN — PROMETHAZINE HYDROCHLORIDE 6.25 MG: 12.5 TABLET ORAL at 04:55

## 2022-01-01 RX ADMIN — TECHNETIUM TC 99M SULFUR COLLOID 1 DOSE: KIT at 07:13

## 2022-01-01 RX ADMIN — FILGRASTIM 300 MCG: 300 INJECTION, SOLUTION INTRAVENOUS; SUBCUTANEOUS at 12:42

## 2022-01-01 RX ADMIN — IPRATROPIUM BROMIDE AND ALBUTEROL SULFATE 3 ML: .5; 2.5 SOLUTION RESPIRATORY (INHALATION) at 16:31

## 2022-01-01 RX ADMIN — MIDODRINE HYDROCHLORIDE 2.5 MG: 5 TABLET ORAL at 17:17

## 2022-01-01 RX ADMIN — GABAPENTIN 100 MG: 100 CAPSULE ORAL at 20:59

## 2022-01-01 RX ADMIN — MIDODRINE HYDROCHLORIDE 2.5 MG: 5 TABLET ORAL at 09:19

## 2022-01-01 RX ADMIN — MIDODRINE HYDROCHLORIDE 2.5 MG: 5 TABLET ORAL at 17:28

## 2022-01-01 RX ADMIN — SENNOSIDES AND DOCUSATE SODIUM 2 TABLET: 50; 8.6 TABLET ORAL at 20:14

## 2022-01-01 RX ADMIN — GABAPENTIN 100 MG: 100 CAPSULE ORAL at 15:47

## 2022-01-01 RX ADMIN — BENZONATATE 100 MG: 100 CAPSULE ORAL at 20:01

## 2022-01-01 RX ADMIN — DEXTROSE 15 G: 15 GEL ORAL at 12:26

## 2022-01-01 RX ADMIN — ERYTHROMYCIN LACTOBIONATE 250 MG: 500 INJECTION, POWDER, LYOPHILIZED, FOR SOLUTION INTRAVENOUS at 23:29

## 2022-01-01 RX ADMIN — METRONIDAZOLE 500 MG: 500 TABLET ORAL at 06:14

## 2022-01-01 RX ADMIN — ONDANSETRON 8 MG: 2 INJECTION INTRAMUSCULAR; INTRAVENOUS at 09:41

## 2022-01-01 RX ADMIN — METRONIDAZOLE 500 MG: 500 TABLET ORAL at 21:00

## 2022-01-01 RX ADMIN — AMIODARONE HYDROCHLORIDE 200 MG: 200 TABLET ORAL at 08:22

## 2022-01-01 RX ADMIN — FILGRASTIM 300 MCG: 300 INJECTION, SOLUTION INTRAVENOUS; SUBCUTANEOUS at 12:46

## 2022-01-01 RX ADMIN — ONDANSETRON 4 MG: 2 INJECTION INTRAMUSCULAR; INTRAVENOUS at 03:33

## 2022-01-01 RX ADMIN — AMIODARONE HYDROCHLORIDE 200 MG: 200 TABLET ORAL at 08:48

## 2022-01-01 RX ADMIN — IOPAMIDOL 100 ML: 755 INJECTION, SOLUTION INTRAVENOUS at 19:11

## 2022-01-01 RX ADMIN — SENNOSIDES AND DOCUSATE SODIUM 2 TABLET: 50; 8.6 TABLET ORAL at 20:26

## 2022-01-01 RX ADMIN — CEFTRIAXONE 2 G: 2 INJECTION, SOLUTION INTRAVENOUS at 06:09

## 2022-01-01 RX ADMIN — PANTOPRAZOLE SODIUM 40 MG: 40 TABLET, DELAYED RELEASE ORAL at 06:07

## 2022-01-01 RX ADMIN — INSULIN ASPART 3 UNITS: 100 INJECTION, SOLUTION INTRAVENOUS; SUBCUTANEOUS at 17:11

## 2022-01-01 RX ADMIN — APIXABAN 5 MG: 2.5 TABLET, FILM COATED ORAL at 21:28

## 2022-01-01 RX ADMIN — ONDANSETRON HYDROCHLORIDE 4 MG: 4 TABLET, FILM COATED ORAL at 09:18

## 2022-01-01 RX ADMIN — GABAPENTIN 100 MG: 100 CAPSULE ORAL at 08:22

## 2022-01-01 RX ADMIN — IPRATROPIUM BROMIDE AND ALBUTEROL SULFATE 3 ML: .5; 3 SOLUTION RESPIRATORY (INHALATION) at 04:25

## 2022-01-01 RX ADMIN — FUROSEMIDE 20 MG: 10 INJECTION, SOLUTION INTRAMUSCULAR; INTRAVENOUS at 17:55

## 2022-01-01 RX ADMIN — AMIODARONE HYDROCHLORIDE 200 MG: 200 TABLET ORAL at 08:15

## 2022-01-01 RX ADMIN — IPRATROPIUM BROMIDE AND ALBUTEROL SULFATE 3 ML: .5; 2.5 SOLUTION RESPIRATORY (INHALATION) at 20:18

## 2022-01-01 RX ADMIN — METRONIDAZOLE 500 MG: 500 INJECTION, SOLUTION INTRAVENOUS at 22:38

## 2022-01-01 RX ADMIN — IPRATROPIUM BROMIDE AND ALBUTEROL SULFATE 3 ML: .5; 2.5 SOLUTION RESPIRATORY (INHALATION) at 15:04

## 2022-01-01 RX ADMIN — SODIUM CHLORIDE 250 ML/HR: 9 INJECTION, SOLUTION INTRAVENOUS at 05:09

## 2022-01-01 RX ADMIN — ATORVASTATIN CALCIUM 10 MG: 10 TABLET, FILM COATED ORAL at 21:28

## 2022-01-01 RX ADMIN — IPRATROPIUM BROMIDE AND ALBUTEROL SULFATE 3 ML: .5; 2.5 SOLUTION RESPIRATORY (INHALATION) at 12:41

## 2022-01-01 RX ADMIN — CEFEPIME HYDROCHLORIDE 2 G: 2 INJECTION, SOLUTION INTRAVENOUS at 12:16

## 2022-01-01 RX ADMIN — ATORVASTATIN CALCIUM 10 MG: 10 TABLET, FILM COATED ORAL at 20:59

## 2022-01-01 RX ADMIN — MIDODRINE HYDROCHLORIDE 2.5 MG: 5 TABLET ORAL at 17:12

## 2022-01-01 RX ADMIN — ACETAMINOPHEN 650 MG: 325 TABLET ORAL at 09:59

## 2022-01-01 RX ADMIN — AMIODARONE HYDROCHLORIDE 200 MG: 200 TABLET ORAL at 14:14

## 2022-01-01 RX ADMIN — METRONIDAZOLE 500 MG: 500 INJECTION, SOLUTION INTRAVENOUS at 09:35

## 2022-01-01 RX ADMIN — TAMSULOSIN HYDROCHLORIDE 0.4 MG: 0.4 CAPSULE ORAL at 21:28

## 2022-01-01 RX ADMIN — TAMSULOSIN HYDROCHLORIDE 0.4 MG: 0.4 CAPSULE ORAL at 20:59

## 2022-01-01 RX ADMIN — SODIUM CHLORIDE, PRESERVATIVE FREE 10 ML: 5 INJECTION INTRAVENOUS at 09:00

## 2022-01-01 RX ADMIN — IPRATROPIUM BROMIDE AND ALBUTEROL SULFATE 3 ML: .5; 2.5 SOLUTION RESPIRATORY (INHALATION) at 21:07

## 2022-01-03 PROBLEM — J18.9 PNEUMONIA OF RIGHT LOWER LOBE DUE TO INFECTIOUS ORGANISM: Status: ACTIVE | Noted: 2022-01-01

## 2022-01-03 NOTE — ED PROVIDER NOTES
Subjective     History provided by:  Patient    History of Present Illness    · Chief complaint: Nausea, vomiting, abdominal pain    · Location: Left lower quadrant abdominal pain.    · Quality/Severity: The patient has severe nausea with dry heaves and vomiting.  She states she has not eaten food in 5 days.  She states she has difficulty tolerating p.o. liquids.  She states she mostly has dry heaves but occasionally actually vomits.    · Timing/Onset: Nausea and vomiting has been present for weeks.    · Modifying Factors: The nursing home has been administering Phenergan without improvement.    · Associated symptoms: Denies a fever.  Denies diarrhea.  Last bowel movement was 2 days ago.    · Narrative: The patient is a 81-year-old white female with a history of nausea and vomiting and dry heaves for weeks.  She was evaluated by Dr. Rogers in November and a gastric emptying study is pending.  The patient's had a lower abdominal pain primarily in the left lower quadrant on and off for weeks, but is gotten worse today.    Review of Systems   Constitutional: Positive for appetite change and fatigue. Negative for chills, diaphoresis and fever.   HENT: Negative for congestion, ear pain, rhinorrhea, sinus pain, sore throat, trouble swallowing and voice change.    Eyes: Negative for pain.   Respiratory: Positive for cough and shortness of breath.    Cardiovascular: Negative for chest pain and palpitations.   Gastrointestinal: Positive for abdominal pain, nausea and vomiting.   Endocrine: Negative for polydipsia and polyuria.   Genitourinary: Negative for difficulty urinating, dysuria, flank pain, frequency and pelvic pain.   Musculoskeletal: Negative for back pain, neck pain and neck stiffness.   Skin: Negative for rash.   Neurological: Positive for weakness. Negative for dizziness, syncope and light-headedness.     Past Medical History:   Diagnosis Date   • Allergic rhinitis    • Anemia    • Anxiety    • Appetite  absent    • Arthritis    • Asthma    • Back pain    • Bell's palsy    • Black tarry stools    • Blood in stool    • Chronic fatigue    • CKD (chronic kidney disease) stage 3, GFR 30-59 ml/min (formerly [facility])    • Community acquired pneumonia of left lung 10/25/2018   • Cough    • Depression    • Diabetes mellitus (formerly [facility])     LAST A1C 6   • Diabetic gastroparesis (formerly [facility]) 2/19/2016   • Difficulty walking    • Excessive urination at night    • Frequent urination    • GERD (gastroesophageal reflux disease)    • GI bleed    • Gout    • H/O blood clots     LEFT LEG 7 OR 8 YEARS AGO   • Heat intolerance    • History of fall 10/2018   • History of prior pregnancies     x8, miscarriage 5   • History of transfusion 11/2018    due to anemia   • Hyperlipidemia    • Hypertension    • Hypothyroidism    • Myelodysplastic syndrome with isolated del(5q) chromosomal abnormality (formerly [facility])    • Normal coronary arteries     by cath 2013   • Orthostatic hypotension    • AARON (obstructive sleep apnea)     DOESNT WEAR REGULARLY   • Paraplegic immobility syndrome    • Pneumonia    • PONV (postoperative nausea and vomiting)    • Skin cancer    • Stroke (formerly [facility])     Several mini-strokes   • TIA (transient ischemic attack)     LAST TIA JULY 2017   • Urination pain    • UTI (urinary tract infection)     Dec 2018 and Jan 2019     /69 (BP Location: Right arm, Patient Position: Lying)   Pulse 95   Temp 98.6 °F (37 °C) (Oral)   Resp 18   Wt 76.2 kg (168 lb)   SpO2 94%   BMI 31.74 kg/m²     Past Medical History:   Diagnosis Date   • Allergic rhinitis    • Anemia    • Anxiety    • Appetite absent    • Arthritis    • Asthma    • Back pain    • Bell's palsy    • Black tarry stools    • Blood in stool    • Chronic fatigue    • CKD (chronic kidney disease) stage 3, GFR 30-59 ml/min (formerly [facility])    • Community acquired pneumonia of left lung 10/25/2018   • Cough    • Depression    • Diabetes mellitus (formerly [facility])     LAST A1C 6   • Diabetic gastroparesis (formerly [facility]) 2/19/2016   •  Difficulty walking    • Excessive urination at night    • Frequent urination    • GERD (gastroesophageal reflux disease)    • GI bleed    • Gout    • H/O blood clots     LEFT LEG 7 OR 8 YEARS AGO   • Heat intolerance    • History of fall 10/2018   • History of prior pregnancies     x8, miscarriage 5   • History of transfusion 11/2018    due to anemia   • Hyperlipidemia    • Hypertension    • Hypothyroidism    • Myelodysplastic syndrome with isolated del(5q) chromosomal abnormality (HCC)    • Normal coronary arteries     by cath 2013   • Orthostatic hypotension    • AARON (obstructive sleep apnea)     DOESNT WEAR REGULARLY   • Paraplegic immobility syndrome    • Pneumonia    • PONV (postoperative nausea and vomiting)    • Skin cancer    • Stroke (HCC)     Several mini-strokes   • TIA (transient ischemic attack)     LAST TIA JULY 2017   • Urination pain    • UTI (urinary tract infection)     Dec 2018 and Jan 2019       Allergies   Allergen Reactions   • Baclofen Anxiety     Panic attack, nightmares   • Codeine Itching and Rash   • Lisinopril Cough   • Morphine Hives   • Penicillins Rash     Tolerates cephalosporins        Past Surgical History:   Procedure Laterality Date   • BACK SURGERY      HARDWARE   • CHOLECYSTECTOMY      OPEN   • COLONOSCOPY  2011    due for repeat in 2021   • COLONOSCOPY N/A 10/28/2018    Procedure: COLONOSCOPY;  Surgeon: Emmanuel Rogers MD;  Location: [facility] OR;  Service: Gastroenterology   • ENDOSCOPY N/A 10/26/2018    Procedure: ESOPHAGOGASTRODUODENOSCOPY;  Surgeon: Emmanuel Rogers MD;  Location: [facility] OR;  Service: Gastroenterology   • ENDOSCOPY N/A 5/13/2021    Procedure: ESOPHAGOGASTRODUODENOSCOPY, biopsy, dilatation;  Surgeon: Emmanuel Rogers MD;  Location: [facility] OR;  Service: Gastroenterology;  Laterality: N/A;  Esophagitis; Dilation- no stricture; Biopsy- esophagus   • HYSTERECTOMY      PARTIAL    • KNEE SURGERY     • NECK SURGERY     • SPINE SURGERY      • TUMOR REMOVAL Left     Leg   • UPPER GASTROINTESTINAL ENDOSCOPY  2014    gastritis.  done by dr. hastings       Family History   Problem Relation Age of Onset   • Lupus Mother    • Heart failure Mother 59   • Heart disease Other    • Hypertension Other    • Heart attack Father    • Breast cancer Neg Hx        Social History     Socioeconomic History   • Marital status:    • Number of children: 3   • Years of education: High School   Tobacco Use   • Smoking status: Never Smoker   • Smokeless tobacco: Never Used   • Tobacco comment: CAFFEINE USE: NONE   Vaping Use   • Vaping Use: Never used   Substance and Sexual Activity   • Alcohol use: No   • Drug use: No   • Sexual activity: Defer     Comment: EXERCISE - RARELY           Objective   Physical Exam  Vitals and nursing note reviewed.   Constitutional:       General: She is not in acute distress.     Appearance: She is well-developed. She is obese. She is not toxic-appearing or diaphoretic.      Comments: The patient appears in no acute distress.  She does not appear toxic.  Review of her vital signs: She is afebrile with a temperature 98.6, respirations are normal 18 with a diminished oxygen saturation of 88%, heart rates normal 90, blood pressure slightly elevated 154/76.   HENT:      Head: Normocephalic and atraumatic.      Mouth/Throat:      Mouth: Mucous membranes are moist.      Pharynx: Oropharynx is clear.   Eyes:      General: No scleral icterus.     Pupils: Pupils are equal, round, and reactive to light.   Cardiovascular:      Rate and Rhythm: Normal rate and regular rhythm.      Heart sounds: No murmur heard.      Pulmonary:      Effort: Pulmonary effort is normal.      Breath sounds: Wheezing and rhonchi present.      Comments: The patient has rhonchi and wheezing on the right.  The left lung is mostly clear.  Abdominal:      General: Abdomen is flat.      Palpations: Abdomen is soft. There is no hepatomegaly, splenomegaly or pulsatile mass.       Tenderness: There is abdominal tenderness in the left upper quadrant and left lower quadrant. There is no right CVA tenderness, left CVA tenderness, guarding or rebound. Negative signs include Maza's sign and McBurney's sign.   Skin:     General: Skin is warm and dry.      Capillary Refill: Capillary refill takes less than 2 seconds.      Coloration: Skin is not pale.      Findings: No rash.   Neurological:      General: No focal deficit present.      Mental Status: She is alert and oriented to person, place, and time.      Cranial Nerves: No cranial nerve deficit.      Motor: No weakness.   Psychiatric:         Mood and Affect: Mood normal.         Behavior: Behavior normal.         Procedures           ED Course  ED Course as of 01/03/22 0638   Mon Jan 03, 2022   0556 Review the patient's laboratory studies: The patient is neutropenic with a white count of 3.13 with a normal differential.  She is anemic with a hemoglobin of 7.3 and hematocrit 23.5 which is a drop from 10/18/2021 when her hemoglobin was 8.6 and hematocrit 29.8 due to her myelodysplasia.  Platelets were normal at 125.  CMP has normal electrolytes.  Creatinine was elevated at 1.23 with a normal BUN of 13 and a diminished GFR of 42 consistent with her chronic kidney disease stage III.  Liver function test within normal limits.  Lipase normal.  Urinalysis was positive for both leukocyte esterases and nitrites, microscopic exam the urine reveals 13-20 WBCs and 4+ bacteria consistent with a UTI.  2 sets of blood cultures are pending. [TP]   0559 Chest x-ray shows a right lower lobe infiltrate consistent with pneumonia.  CT of the abdomen pelvis with IV contrast showed no acute findings in the abdomen or pelvis.  Small pleural effusions, right greater than left were noted and a right base infiltrate was seen. [TP]   0559 The patient was administered a normal saline 500 cc bolus for history of nausea and vomiting and difficulty tolerating p.o. liquids.   She was then administered normal saline 250 cc/h.  For nausea she was administered Zofran 8 mg IV. [TP]   0600 The patient arrived hypoxic with a room air oxygen saturation of 88% and was noted to have rhonchi and wheezes on the right.  She was suctioned by respiratory and given albuterol treatments and transiently improved her oxygen saturation to 94% on room air but it quickly dropped back to 88%.  When placed on oxygen 2 L via nasal cannula her oxygen improved to 96%. [TP]   0636 Antibiotic therapy for her UTI and right lower lobe pneumonia was initiated with Rocephin 2 g and Zithromax 500 mg IV. [TP]   0636 06:06 patient discussed with Dr. Mcintosh, hospitalist, who agreed to admit the patient. [TP]      ED Course User Index  [TP] Christos Lee MD                                                 MDM  Number of Diagnoses or Management Options  Anemia assoc with myelodysplastic syndrome treated with erythropoietin (HCC): new and requires workup  Hypoxia: new and requires workup  Pneumonia of right lower lobe due to infectious organism: new and requires workup  Urinary tract infection in elderly patient: new and requires workup     Amount and/or Complexity of Data Reviewed  Clinical lab tests: ordered and reviewed  Tests in the radiology section of CPT®: ordered and reviewed  Discuss the patient with other providers: yes    Risk of Complications, Morbidity, and/or Mortality  Presenting problems: high  Diagnostic procedures: high  Management options: high  General comments: My differential diagnosis for abdominal pain includes but is not limited to:  Gastritis, gastroenteritis, peptic ulcer disease, GERD, esophageal perforation, acute appendicitis, mesenteric adenitis, Meckel’s diverticulum, epiploic appendagitis, diverticulitis, colon cancer, ulcerative colitis, Crohn’s disease, intussusception, small bowel obstruction, adhesions, ischemic bowel, perforated viscus, ileus, obstipation, biliary colic, cholecystitis,  cholelithiasis, Chad-Stu Jose, hepatitis, pancreatitis, common bile duct obstruction, cholangitis, bile leak, splenic trauma, splenic rupture, splenic infarction, splenic abscess, abdominal abscess, ascites, spontaneous bacterial peritonitis, hernia, UTI, cystitis, prostatitis, ureterolithiasis, urinary obstruction, AAA, myocardial infarction, pneumonia, cancer, porphyria, DKA, medications, sickle cell, viral syndrome, zoster    Patient Progress  Patient progress: stable      Final diagnoses:   Pneumonia of right lower lobe due to infectious organism   Hypoxia   Urinary tract infection in elderly patient   Anemia assoc with myelodysplastic syndrome treated with erythropoietin (HCC)       ED Disposition  ED Disposition     ED Disposition Condition Comment    Decision to Admit  Level of Care: Med/Surg [1]   Diagnosis: Pneumonia of right lower lobe due to infectious organism [[id number]]   Admitting Physician: ROSARIO MORLEY [[id number]]   Bed Request Comments: Pneumonia, urinary tract infection, anemia            No follow-up provider specified.       Medication List      No changes were made to your prescriptions during this visit.         Labs Reviewed   COMPREHENSIVE METABOLIC PANEL - Abnormal; Notable for the following components:       Result Value    Creatinine 1.23 (*)     Calcium 7.5 (*)     Total Protein 5.9 (*)     Albumin 2.60 (*)     eGFR Non  Amer 42 (*)     All other components within normal limits    Narrative:     GFR Normal >60  Chronic Kidney Disease <60  Kidney Failure <15     LIPASE - Abnormal; Notable for the following components:    Lipase 11 (*)     All other components within normal limits   URINALYSIS W/ MICROSCOPIC IF INDICATED (NO CULTURE) - Abnormal; Notable for the following components:    Appearance, UA Cloudy (*)     Blood, UA Trace (*)     Protein,  mg/dL (2+) (*)     Leuk Esterase, UA Small (1+) (*)     Nitrite, UA Positive (*)     All other components within normal limits    CBC WITH AUTO DIFFERENTIAL - Abnormal; Notable for the following components:    WBC 3.13 (*)     RBC 2.67 (*)     Hemoglobin 7.3 (*)     Hematocrit 23.5 (*)     MCHC 31.1 (*)     RDW 19.8 (*)     RDW-SD 62.2 (*)     Platelets 125 (*)     Immature Grans % 0.6 (*)     All other components within normal limits   URINALYSIS, MICROSCOPIC ONLY - Abnormal; Notable for the following components:    RBC, UA 6-12 (*)     WBC, UA 13-20 (*)     Bacteria, UA 4+ (*)     All other components within normal limits   PROCALCITONIN - Abnormal; Notable for the following components:    Procalcitonin 0.51 (*)     All other components within normal limits   LACTIC ACID, PLASMA - Normal   BLOOD CULTURE   BLOOD CULTURE   COVID PRE-OP / PRE-PROCEDURE SCREENING ORDER (NO ISOLATION)    Narrative:     The following orders were created for panel order COVID PRE-OP / PRE-PROCEDURE SCREENING ORDER (NO ISOLATION) - Swab, Nasopharynx.  Procedure                               Abnormality         Status                     ---------                               -----------         ------                     COVID-19 and FLU A/B PCR...[[id number]]                      In process                   Please view results for these tests on the individual orders.   RESPIRATORY CULTURE   COVID-19 AND FLU A/B, NP SWAB IN TRANSPORT MEDIA 8-12 HR TAT   URINE CULTURE   STREP PNEUMO AG, URINE OR CSF   LEGIONELLA ANTIGEN, URINE   CBC AND DIFFERENTIAL    Narrative:     The following orders were created for panel order CBC & Differential.  Procedure                               Abnormality         Status                     ---------                               -----------         ------                     CBC Auto Differential[[id number]]        Abnormal            Final result               Scan Slide[[id number]]                                                                    Please view results for these tests on the individual orders.     XR Chest 2 View    Final Result   There are patchy densities in the right lower lobe consistent with infiltrates. Left lung is clear.      Signer Name: Henry Mendiola MD    Signed: 1/3/2022 5:37 AM    Workstation Name: [facility]      CT Abdomen Pelvis With Contrast   Final Result   Small right greater than left effusions with right base atelectasis and infiltrate      No acute findings in the abdomen and pelvis               Signer Name: Henry Mendiola MD    Signed: 1/3/2022 5:39 AM    Workstation Name: [email or web address]             Medication List      No changes were made to your prescriptions during this visit.              Christos Lee MD  01/03/22 0638

## 2022-01-03 NOTE — H&P
"[facility] HOSPITALIST     Marcy Goncalves APRN    CHIEF COMPLAINT:     Nausea, cough, faitgue    HISTORY OF PRESENT ILLNESS:    Patient is a 81 y.o female from the nursing home with multiple medical problems who presented to the ED complaining of severe nausea and dry heaves that has been ongoing since Wednesday. She reports one episode of emesis. She has been unable to keep any food down and reports occasional difficulty with swallowing and feeling like food is going down the wrong way. Patient has been receiving phenergan without improvement in her symptoms. She also reports a non-productive cough with increasing shortness of breath and \"feels like she has pneumonia\". Patient also reports some dysuria. She denies any associated fever.  She has some mild llq abdominal pain .      Past Medical History:   Diagnosis Date   • Allergic rhinitis    • Anemia    • Anxiety    • Appetite absent    • Arthritis    • Asthma    • Back pain    • Bell's palsy    • Black tarry stools    • Blood in stool    • Chronic fatigue    • CKD (chronic kidney disease) stage 3, GFR 30-59 ml/min ([facility])    • Community acquired pneumonia of left lung 10/25/2018   • Cough    • Depression    • Diabetes mellitus ([facility])     LAST A1C 6   • Diabetic gastroparesis ([facility]) 2/19/2016   • Difficulty walking    • Excessive urination at night    • Frequent urination    • GERD (gastroesophageal reflux disease)    • GI bleed    • Gout    • H/O blood clots     LEFT LEG 7 OR 8 YEARS AGO   • Heat intolerance    • History of fall 10/2018   • History of prior pregnancies     x8, miscarriage 5   • History of transfusion 11/2018    due to anemia   • Hyperlipidemia    • Hypertension    • Hypothyroidism    • Myelodysplastic syndrome with isolated del(5q) chromosomal abnormality ([facility])    • Normal coronary arteries     by cath 2013   • Orthostatic hypotension    • AARON (obstructive sleep apnea)     DOESNT WEAR REGULARLY   • Paraplegic immobility " syndrome    • Pneumonia    • PONV (postoperative nausea and vomiting)    • Skin cancer    • Stroke (HCC)     Several mini-strokes   • TIA (transient ischemic attack)     LAST TIA JULY 2017   • Urination pain    • UTI (urinary tract infection)     Dec 2018 and Jan 2019     Past Surgical History:   Procedure Laterality Date   • BACK SURGERY      HARDWARE   • CHOLECYSTECTOMY      OPEN   • COLONOSCOPY  2011    due for repeat in 2021   • COLONOSCOPY N/A 10/28/2018    Procedure: COLONOSCOPY;  Surgeon: Emmanuel Rogers MD;  Location: [facility] OR;  Service: Gastroenterology   • ENDOSCOPY N/A 10/26/2018    Procedure: ESOPHAGOGASTRODUODENOSCOPY;  Surgeon: Emmanuel Rogers MD;  Location: [facility] OR;  Service: Gastroenterology   • ENDOSCOPY N/A 5/13/2021    Procedure: ESOPHAGOGASTRODUODENOSCOPY, biopsy, dilatation;  Surgeon: Emmanuel Rogers MD;  Location: [facility] OR;  Service: Gastroenterology;  Laterality: N/A;  Esophagitis; Dilation- no stricture; Biopsy- esophagus   • HYSTERECTOMY      PARTIAL    • KNEE SURGERY     • NECK SURGERY     • SPINE SURGERY     • TUMOR REMOVAL Left     Leg   • UPPER GASTROINTESTINAL ENDOSCOPY  2014    gastritis.  done by dr. hastings     Family History   Problem Relation Age of Onset   • Lupus Mother    • Heart failure Mother 59   • Heart disease Other    • Hypertension Other    • Heart attack Father    • Breast cancer Neg Hx      Social History     Tobacco Use   • Smoking status: Never Smoker   • Smokeless tobacco: Never Used   • Tobacco comment: CAFFEINE USE: NONE   Vaping Use   • Vaping Use: Never used   Substance Use Topics   • Alcohol use: No   • Drug use: No     (Not in a hospital admission)    Allergies:  Baclofen, Codeine, Lisinopril, Morphine, and Penicillins    Immunization History   Administered Date(s) Administered   • COVID-19 (PFIZER) 03/11/2021, 04/01/2021   • FluMist 2-49yrs 09/15/2015   • Fluad Quad 65+ 09/16/2020   • Fluzone High Dose =>65 Years ([employer]  ONLY) 09/26/2017, 09/28/2018, 09/03/2019   • Pneumococcal Polysaccharide (PPSV23) 11/06/2017   • Shingrix 06/09/2020           REVIEW OF SYSTEMS:    Please see the above history of present illness for pertinent positives and negatives.  The remainder of the patient's systems have been reviewed and are negative.     Vital Signs  Temp:  [98.6 °F (37 °C)] 98.6 °F (37 °C)  Heart Rate:  [89-96] 95  Resp:  [18-24] 18  BP: (142-154)/(69-76) 142/69    Flowsheet Rows      First Filed Value   Admission Height --   Admission Weight 76.2 kg (168 lb) Documented at 01/03/2022 0357             Physical Exam:    Physical Exam  Vitals reviewed.   Constitutional:       General: She is not in acute distress.     Appearance: She is obese.   HENT:      Head: Normocephalic and atraumatic.      Comments: [place]     Mouth/Throat:      Mouth: Mucous membranes are moist.   Eyes:      General: No scleral icterus.     Pupils: Pupils are equal, round, and reactive to light.   Cardiovascular:      Rate and Rhythm: Normal rate and regular rhythm.      Heart sounds: No murmur heard.      Pulmonary:      Breath sounds: Wheezing, rhonchi and rales present.   Abdominal:      General: Bowel sounds are normal. There is no distension.      Palpations: Abdomen is soft.      Tenderness: There is no abdominal tenderness.   Musculoskeletal:      Right lower leg: No edema.      Left lower leg: No edema.   Skin:     General: Skin is warm and dry.      Findings: No rash.   Neurological:      Mental Status: She is alert and oriented to person, place, and time. Mental status is at baseline.   Psychiatric:         Mood and Affect: Mood normal.         Behavior: Behavior normal.               Results Review:    I reviewed the patient's new clinical results.  Lab Results (most recent)     Procedure Component Value Units Date/Time    Procalcitonin [[id number]]  (Abnormal) Collected: 01/03/22 0424    Specimen: Blood Updated: 01/03/22 0622     Procalcitonin 0.51 ng/mL      COVID PRE-OP / PRE-PROCEDURE SCREENING ORDER (NO ISOLATION) - Swab, Nasopharynx [[id number]] Collected: 01/03/22 0610    Specimen: Swab from Nasopharynx Updated: 01/03/22 0617    Narrative:      The following orders were created for panel order COVID PRE-OP / PRE-PROCEDURE SCREENING ORDER (NO ISOLATION) - Swab, Nasopharynx.  Procedure                               Abnormality         Status                     ---------                               -----------         ------                     COVID-19 and FLU A/B PCR...[[id number]]                      In process                   Please view results for these tests on the individual orders.    COVID-19 and FLU A/B PCR - Swab, Nasopharynx [[id number]] Collected: 01/03/22 0610    Specimen: Swab from Nasopharynx Updated: 01/03/22 0617    Lactic Acid, Plasma [[id number]] Collected: 01/03/22 0610    Specimen: Blood Updated: 01/03/22 0616    Urine Culture - Urine, Urine, Clean Catch [[id number]] Collected: 01/03/22 0505    Specimen: Urine, Clean Catch Updated: 01/03/22 0615    Urinalysis, Microscopic Only - Urine, Clean Catch [[id number]]  (Abnormal) Collected: 01/03/22 0505    Specimen: Urine, Clean Catch Updated: 01/03/22 0523     RBC, UA 6-12 /HPF      WBC, UA 13-20 /HPF      Bacteria, UA 4+ /HPF      Squamous Epithelial Cells, UA None Seen /HPF      Hyaline Casts, UA None Seen /LPF      Methodology Manual Light Microscopy    Urinalysis With Microscopic If Indicated (No Culture) - Urine, Clean Catch [[id number]]  (Abnormal) Collected: 01/03/22 0505    Specimen: Urine, Clean Catch Updated: 01/03/22 0511     Color, UA Yellow     Appearance, UA Cloudy     pH, UA 7.5     Specific Gravity, UA 1.025     Glucose, UA Negative     Ketones, UA Negative     Bilirubin, UA Negative     Blood, UA Trace     Protein,  mg/dL (2+)     Leuk Esterase, UA Small (1+)     Nitrite, UA Positive     Urobilinogen, UA 1.0 E.U./dL    Lipase [[id number]]  (Abnormal) Collected: 01/03/22 0424     Specimen: Blood Updated: 01/03/22 0454     Lipase 11 U/L     Comprehensive Metabolic Panel [[id number]]  (Abnormal) Collected: 01/03/22 0424    Specimen: Blood Updated: 01/03/22 0454     Glucose 68 mg/dL      BUN 13 mg/dL      Creatinine 1.23 mg/dL      Sodium 136 mmol/L      Potassium 3.6 mmol/L      Chloride 100 mmol/L      CO2 25.6 mmol/L      Calcium 7.5 mg/dL      Total Protein 5.9 g/dL      Albumin 2.60 g/dL      ALT (SGPT) 8 U/L      AST (SGOT) 12 U/L      Alkaline Phosphatase 92 U/L      Total Bilirubin 0.8 mg/dL      eGFR Non African Amer 42 mL/min/1.73      Globulin 3.3 gm/dL      A/G Ratio 0.8 g/dL      BUN/Creatinine Ratio 10.6     Anion Gap 10.4 mmol/L     Narrative:      GFR Normal >60  Chronic Kidney Disease <60  Kidney Failure <15      CBC & Differential [[id number]]  (Abnormal) Collected: 01/03/22 0424    Specimen: Blood Updated: 01/03/22 0435    Narrative:      The following orders were created for panel order CBC & Differential.  Procedure                               Abnormality         Status                     ---------                               -----------         ------                     CBC Auto Differential[[id number]]        Abnormal            Final result               Scan Slide[[id number]]                                                                    Please view results for these tests on the individual orders.    CBC Auto Differential [[id number]]  (Abnormal) Collected: 01/03/22 0424    Specimen: Blood Updated: 01/03/22 0434     WBC 3.13 10*3/mm3      RBC 2.67 10*6/mm3      Hemoglobin 7.3 g/dL      Hematocrit 23.5 %      MCV 88.0 fL      MCH 27.3 pg      MCHC 31.1 g/dL      RDW 19.8 %      RDW-SD 62.2 fl      MPV --     Comment: Unable to calculate        Platelets 125 10*3/mm3      Neutrophil % 57.5 %      Lymphocyte % 26.2 %      Monocyte % 10.9 %      Eosinophil % 3.5 %      Basophil % 1.3 %      Immature Grans % 0.6 %      Neutrophils, Absolute 1.80 10*3/mm3       Lymphocytes, Absolute 0.82 10*3/mm3      Monocytes, Absolute 0.34 10*3/mm3      Eosinophils, Absolute 0.11 10*3/mm3      Basophils, Absolute 0.04 10*3/mm3      Immature Grans, Absolute 0.02 10*3/mm3      nRBC 0.0 /100 WBC           Imaging Results (Most Recent)     Procedure Component Value Units Date/Time    CT Abdomen Pelvis With Contrast [[id number]] Collected: 01/03/22 0539     Updated: 01/03/22 0541    Narrative:      CT Abdomen Pelvis W    INDICATION:   Left-sided abdominal pain with nausea and vomiting today    TECHNIQUE:   CT of the abdomen and pelvis with IV contrast. Coronal and sagittal reconstructions were obtained.  Radiation dose reduction techniques included automated exposure control or exposure modulation based on body size. Count of known CT and cardiac nuc med  studies performed in previous 12 months: 3.     COMPARISON:   7/29/2020    FINDINGS:  Abdomen: Small right greater than left effusions. There is patchy dense infiltrate in the right lower lobe with some minimal posterior atelectasis. Gallbladder is been removed. Liver, spleen, pancreas, adrenal glands and kidneys are normal in appearance.  Aorta is normal in size. There is no adenopathy. Bowel is normal.    Pelvis: Bladder shows mild bladder wall thickening. Prostate gland is normal. Bones are unremarkable. There are postoperative changes in lumbar spine.      Impression:      Small right greater than left effusions with right base atelectasis and infiltrate    No acute findings in the abdomen and pelvis          Signer Name: Henry Mendiola MD   Signed: 1/3/2022 5:39 AM   Workstation Name: [facility]    [facility]    XR Chest 2 View [[id number]] Collected: 01/03/22 0537     Updated: 01/03/22 0539    Narrative:      CR Chest 2 Vws    INDICATION:    Wheezing today    COMPARISON:    8/26/2021    FINDINGS:   PA and lateral views of the chest.  Heart size normal. There is a venous catheter in the left arm. The tip is near the  axilla. Left lungs clear. Right lung shows patchy density right lower lobe        Impression:      There are patchy densities in the right lower lobe consistent with infiltrates. Left lung is clear.    Signer Name: Henry Mendiola MD   Signed: 1/3/2022 5:37 AM   Workstation Name: [email or web address]    [facility] [place]        CXR personally reviewed and shows RLL pneumonia     ECG/EMG Results (most recent)     None        reviewed    Assessment/Plan   Active Hospital Problems    Diagnosis  POA   • Pneumonia of right lower lobe due to infectious organism [J18.9]  Yes      Resolved Hospital Problems   No resolved problems to display.       Acute hypoxic respiratory failure d/t pneumonia  - possible aspiration pneumonia   - speech therapy consult  - blood cx- pending  - sputum cx- pending  - strep and legionella antigens- pending  - initially received Rocephin and azithromycin in the ED  - will change to Cefepime and flagyl for now   - supplemental oxygen as needed  - duo-nebs scheduled and prn    Acute UTI- POA  - urine cx- pending  - continue abx as above    Nausea, Vomiting  - likely d/t above  - CT abd/pel unremarkable  - lipase 11  - follows with Dr. 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  History of transfusional iron overload:  - Hgb 7.3     CKD stage IV  - Baseline creatinine variable from 1.6-3.1  - Cr actual better than baseline   - repeat in am        Chronic dCHF, HTN, HLP, a flutter  - Last echo 8/6/2021 with EF 63.8%, mildly reduced RV function, mild to moderate septal asymmetry and hypertrophy, trace to mild TR  - home med rec pending      Hypothyroidism: Last TSH elevated with free T4 normal on 8/6/2021, add home levothyroxine 88 mcg daily     DM2 with diabetic retinopathy/diabetic gastroparesis in obese: Last A1c 6.1% on 8/6/2021  Glucose currently at goal  Add moderate dose SSI with Accu-Cheks AC/at bedtime     History of DVT     History of  TIA     History of AARON with chronic nocturnal hypoxia: Not on CPAP, uses 2L with sleep     Bell's palsy  - Chronic left facial droop     Depression  - home med rec pending     DVT Prophylaxis  - SCDs        I discussed the patient's findings and my recommendations with patient.      This patient has been examined wearing appropriate Personal Protective Equipment  01/03/22      Vladimir Mcintosh DO  01/03/22  06:28 EST

## 2022-01-03 NOTE — PLAN OF CARE
Goal Outcome Evaluation:  Plan of Care Reviewed With: patient        Progress: improving  Outcome Summary: vss. pt resting in room. o2 at 2L n.c. pt with non-productive cough, awaiting sample from pt. pt a&o, incont of bowel/bladder. purewick in place. chronic back pain. awaiting approval for diet order, see SLP note. pt did take medications whole with applesauce and thin liquid, no complaint, no s/s of aspiration noted. medicated one time for cough. iv antibiotics in place.  no other complaints at this time.

## 2022-01-03 NOTE — PROGRESS NOTES
Pharmacy Consult - Metronidazole Dosing    Joya Singh is a 81 y.o. female who has been consulted for IV Metronidazole dosing for possible aspiration pneumonia and possible C. Diff infection  Relevant clinical data and objective history reviewed:  Lab Results   Component Value Date/Time    CREATININE 1.23 (H) 01/03/2022 04:24 AM    CREATININE 1.51 (H) 10/18/2021 02:25 PM    CREATININE 1.49 (H) 09/14/2021 03:29 PM    BUN 13 01/03/2022 04:24 AM    BUN 14 10/18/2021 02:25 PM    BUN 16 09/14/2021 03:29 PM     Estimated Creatinine Clearance: 34.7 mL/min (A) (by C-G formula based on SCr of 1.23 mg/dL (H)).    Lab Results   Component Value Date/Time    WBC 3.13 (L) 01/03/2022 04:24 AM    WBC 5.5 01/12/2021 10:10 AM    HGB 7.3 (L) 01/03/2022 04:24 AM    HCT 23.5 (L) 01/03/2022 04:24 AM    MCV 88.0 01/03/2022 04:24 AM     (L) 01/03/2022 04:24 AM     Temp Readings from Last 3 Encounters:   01/03/22 98.6 °F (37 °C) (Oral)   12/22/21 98 °F (36.7 °C)   11/11/21 97.5 °F (36.4 °C) (Temporal)     Weight: 76.2 kg (168 lb)    Assessment/Plan  The patient will be started on Metronidazole 500 mg IV every 8 hours. Pharmacy will continue to follow the patient’s culture results and clinical progress daily.    Thank you-    Jarod Corral, MuD

## 2022-01-03 NOTE — PLAN OF CARE
Goal Outcome Evaluation:  Plan of Care Reviewed With: patient, other (see comments) (RN, Billie)        Progress: no change  Outcome Summary: SLP: Attempted clinical swallow eval and pt willing to participate with liquids only. No clinical s/s of aspiration noted on trials of thins from straw. RN reports the same. Able to tolerate medicines whole in applesauce followed by sips of water per RN. Multiple evaluations completed on pt in the past with recommendation for regular diet with thins. Currently on a GI soft diet (regular texture) with thins at [facility]. Hx of mild oropharyngeal dysphagia and esophageal dysphagia. Recommend to start a GI soft diet, whole foods and thin liquids. SLP will follow for further evaluation as pt allows. Declines food at this time due to nausea which is a chronic complaint of this patient. Recommend reflux precautions, aspiration precautions and oral care before breakfast and after meals. Upright positioning for all po intake. Further recommendations to follow.

## 2022-01-04 NOTE — THERAPY EVALUATION
Acute Care - Speech Language Pathology   Swallow Initial Evaluation VICKY Sharma     Patient Name: Joya Singh  : 1940  MRN: [id number]  Today's Date: 1/3/2022               Admit Date: 1/3/2022    Visit Dx:     ICD-10-CM ICD-9-CM   1. Pneumonia of right lower lobe due to infectious organism  J18.9 486   2. Hypoxia  R09.02 799.02   3. Urinary tract infection in elderly patient  N39.0 599.0   4. Anemia assoc with myelodysplastic syndrome treated with erythropoietin ([facility])  D46.9 285.22    D63.0 238.75   5. Dysphagia, unspecified type  R13.10 787.20     Patient Active Problem List   Diagnosis   • Arthritis   • Chronic back pain   • Chronic anemia   • Anxiety and depression   • Type 2 diabetes mellitus with neurologic complication ([facility])   • Brittle diabetes mellitus ([facility])   • Dizzy   • Mixed hyperlipidemia   • Essential hypertension   • Insomnia with sleep apnea   • Obstructive sleep apnea syndrome   • Peripheral neuropathy   • Diabetic gastroparesis ([facility])   • Primary localized osteoarthrosis of left shoulder region   • Rotator cuff tendonitis   • Acquired hypothyroidism   • Anxiety   • History of biliary T-tube placement   • CKD stage 3 due to type 2 diabetes mellitus ([facility])   • Complex tear of medial meniscus of right knee as current injury   • Insufficiency fracture of tibia   • Primary osteoarthritis of left knee   • Orthostatic hypotension   • Tendinitis of right rotator cuff   • AC joint arthropathy   • Subacromial impingement of right shoulder   • Right carpal tunnel syndrome   • Anemia requiring transfusions   • Monoclonal gammopathy of unknown significance (MGUS)   • Myelodysplastic syndrome with 5q deletion ([facility])   • History of deep venous thrombosis (DVT) of distal vein of left lower extremity   • Moderate episode of recurrent major depressive disorder ([facility])   • Chronic diastolic CHF (congestive heart failure) ([facility])   • Knee pain   • Primary osteoarthritis of right knee   • Iron overload, transfusional    • Uncontrolled type 2 diabetes mellitus with hyperglycemia ([facility])   • Localized edema   • Type 2 diabetes mellitus with diabetic neuropathy ([facility])   • Vitamin D deficiency   • Subacromial bursitis of left shoulder joint   • Gastroesophageal reflux disease with esophagitis without hemorrhage   • Esophageal dysphagia   • Anemia in neoplastic disease   • Leukocytes in urine   • Atrial flutter (H[facility])   • Moderate malnutrition ([facility]CC)   • Polypharmacy   • Pneumonia due to COVID-19 virus   • Acute kidney injury ([facility])   • Cytokine release syndrome, grade 2   • Pneumonia of right lower lobe due to infectious organism     Past Medical History:   Diagnosis Date   • Allergic rhinitis    • Anemia    • Anxiety    • Appetite absent    • Arthritis    • Asthma    • Back pain    • Bell's palsy    • Black tarry stools    • Blood in stool    • Chronic fatigue    • CKD (chronic kidney disease) stage 3, GFR 30-59 ml/min ([facility])    • Community acquired pneumonia of left lung 10/25/2018   • Cough    • Depression    • Diabetes mellitus ([facility])     LAST A1C 6   • Diabetic gastroparesis ([facility]) 2/19/2016   • Difficulty walking    • Excessive urination at night    • Frequent urination    • GERD (gastroesophageal reflux disease)    • GI bleed    • Gout    • H/O blood clots     LEFT LEG 7 OR 8 YEARS AGO   • Heat intolerance    • History of fall 10/2018   • History of prior pregnancies     x8, miscarriage 5   • History of transfusion 11/2018    due to anemia   • Hyperlipidemia    • Hypertension    • Hypothyroidism    • Myelodysplastic syndrome with isolated del(5q) chromosomal abnormality ([facility])    • Normal coronary arteries     by cath 2013   • Orthostatic hypotension    • AARON (obstructive sleep apnea)     DOESNT WEAR REGULARLY   • Paraplegic immobility syndrome    • Pneumonia    • PONV (postoperative nausea and vomiting)    • Skin cancer    • Stroke ([facility])     Several mini-strokes   • TIA (transient ischemic attack)     LAST TIA JULY 2017   • Urination  pain    • UTI (urinary tract infection)     Dec 2018 and Jan 2019     Past Surgical History:   Procedure Laterality Date   • BACK SURGERY      HARDWARE   • CHOLECYSTECTOMY      OPEN   • COLONOSCOPY  2011    due for repeat in 2021   • COLONOSCOPY N/A 10/28/2018    Procedure: COLONOSCOPY;  Surgeon: Emmanuel Rogers MD;  Location:  [facility];  Service: Gastroenterology   • ENDOSCOPY N/A 10/26/2018    Procedure: ESOPHAGOGASTRODUODENOSCOPY;  Surgeon: Emmanuel Rogers MD;  Location:  [facility];  Service: Gastroenterology   • ENDOSCOPY N/A 5/13/2021    Procedure: ESOPHAGOGASTRODUODENOSCOPY, biopsy, dilatation;  Surgeon: Emmanuel Rogers MD;  Location:  [facility];  Service: Gastroenterology;  Laterality: N/A;  Esophagitis; Dilation- no stricture; Biopsy- esophagus   • HYSTERECTOMY      PARTIAL    • KNEE SURGERY     • NECK SURGERY     • SPINE SURGERY     • TUMOR REMOVAL Left     Leg   • UPPER GASTROINTESTINAL ENDOSCOPY  2014    gastritis.  done by dr. hastings       SLP Recommendation and Plan  SLP Swallowing Diagnosis: functional oral phase, functional pharyngeal phase, other (see comments) (on thin liquids only; pt refuses solids/puree at this time) (01/03/22 1546)  SLP Diet Recommendation: soft textures, whole, thin liquids, other (see comments) (GI soft/bland) (01/03/22 1546)  Recommended Precautions and Strategies: upright posture during/after eating, small bites of food and sips of liquid, general aspiration precautions, reflux precautions, fatigue precautions (01/03/22 1546)  SLP Rec. for Method of Medication Administration: meds whole, with pudding or applesauce, as tolerated (01/03/22 1546)     Monitor for Signs of Aspiration: no, notify SLP if any concerns (01/03/22 1546)  Recommended Diagnostics: reassess via clinical swallow evaluation (01/03/22 1546)     Anticipated Discharge Disposition (SLP): extended care facility (01/03/22 1546)                        Patient/Family Concerns,  Anticipated Discharge Disposition (SLP): Pt reports no concerns at this time. (01/03/22 1372)                  Plan of Care Reviewed With: patient, other (see comments) (RN, Billie)  Progress: no change  Outcome Summary: SLP: Attempted clinical swallow eval and pt willing to participate with liquids only. No clinical s/s of aspiration noted on trials of thins from straw. RN reports the same. Able to tolerate medicines whole in applesauce followed by sips of water per RN. Multiple evaluations completed on pt in the past with recommendation for regular diet with thins. Currently on a GI soft diet (regular texture) with thins at [facility]. Recommend to start a GI soft diet, whole foods and thin liquids. SLP will follow for further evaluation as pt allows. Declines food at this time due to nausea which is a chronic complaint of this patient. Recommend reflux precautions, aspiration precautions and oral care before breakfast and after meals. Upright positioning for all po intake. Further recommendations to follow.      SWALLOW EVALUATION (last 72 hours)     SLP Adult Swallow Evaluation     Row Name 01/03/22 1546                   Rehab Evaluation    Document Type evaluation  -AD        Subjective Information complains of; nausea/vomiting  -AD        Patient Observations alert  somewhat cooperative; refuses puree and solid foods at this time  -AD        Patient/Family/Caregiver Comments/Observations Pt seen in bed in upright position. Initially had blanket over her head. Pt stating she is nauseated from her pills given.  -AD        Patient Effort fair  -AD        Symptoms Noted During/After Treatment other (see comments)  reports nausea and fatigue  -AD                  General Information    Patient Profile Reviewed yes  -AD        Pertinent History Of Current Problem Pt is a 80 y/o female admitted with acute hypoxic respiratory failure due to PNA, acute UTI, nausea and vomiting, CKD stage 4, chr dCHF, HTN,  aflutter. Swallow eval ordered due to concerns for possible aspiration. Per RN, she completed a dysphagia screen and pt passed. Able to take meds in applesauce w/o difficulty.  -AD        Current Method of Nutrition NPO  -AD        Precautions/Limitations, Vision WFL; for purposes of eval  -AD        Precautions/Limitations, Hearing WFL; for purposes of eval  -AD        Prior Level of Function-Communication cognitive-linguistic impairment  mild deficits in past; oriented to person, place and situation.  -AD        Prior Level of Function-Swallowing esophageal concerns  GI soft diet w/thins  -AD        Plans/Goals Discussed with patient; agreed upon  -AD        Barriers to Rehab medically complex; cognitive status  -AD        Patient's Goals for Discharge patient did not state  -AD                  Pain    Additional Documentation --  reports nausea but no pain reported  -AD                  Oral Motor Structure and Function    Oral Lesions or Structural Abnormalities and/or variants none  -AD        Dentition Assessment missing teeth; poor oral hygiene  no lower molars and upper missing teeth as well  -AD        Secretion Management WNL/WFL  -AD        Mucosal Quality moist, healthy  -AD        Volitional Swallow WFL  -AD        Volitional Cough WFL  -AD                  Oral Musculature and Cranial Nerve Assessment    Oral Motor General Assessment generalized oral motor weakness; mandibular impairment; oral labial or buccal impairment; lingual impairment; vocal impairment  -AD        Mandibular Impairment Detail, Cranial Nerve V (Trigeminal) reduced strength on right  -AD        Oral Labial or Buccal Impairment, Detail, Cranial Nerve VII (Facial): right labial droop; other (see comments); reduced ROM  reduced ROM of elevation of upper lip on right  -AD        Lingual Impairment, Detail. Cranial Nerves IX, XII (Glossopharyngeal and Hypoglossal) other (see comments)  slight deviation to the right upon protrusion  -AD         Vocal Impairment, Detail. Cranial Nerve X (Vagus) vocal quality abnormality (see comments); other (see comments)  hoarse vocal quality  -AD        Oral Motor, Comment right labial weakness and tongue deviation to right, chronic  -AD                  General Eating/Swallowing Observations    Respiratory Support Currently in Use nasal cannula  -AD        Eating/Swallowing Skills self-fed; appropriate self-feeding skills observed  -AD        Positioning During Eating upright in bed; other (see comments)  near 90 degrees  -AD        Utensils Used straw  -AD        Consistencies Trialed thin liquids  refuses po trials of food (solids and puree)  -AD                  Respiratory    Respiratory Status WFL; during swallowing/eating  -AD                  Clinical Swallow Eval    Oral Prep Phase WFL  -AD        Oral Transit WFL  -AD        Oral Residue WFL  -AD        Pharyngeal Phase no overt signs/symptoms of pharyngeal impairment  -AD        Esophageal Phase unremarkable  -AD        Clinical Swallow Evaluation Summary Pt with functional swallow on thins from straw. Refuses any further po intake at this time due to nausea. Limited results due to limited participation. Recommend to further evaluate tomorrow when pt willing to participate.  -AD                  SLP Evaluation Clinical Impression    SLP Swallowing Diagnosis functional oral phase; functional pharyngeal phase; other (see comments)  on thin liquids only; pt refuses solids/puree at this time  -AD        Functional Impact risk of malnutrition; risk of dehydration  -AD                  Recommendations    SLP Diet Recommendation soft textures; whole; thin liquids; other (see comments)  GI soft/bland  -AD        Recommended Diagnostics reassess via clinical swallow evaluation  -AD        Recommended Precautions and Strategies upright posture during/after eating; small bites of food and sips of liquid; general aspiration precautions; reflux precautions; fatigue  precautions  -AD        Oral Care Recommendations Oral Care before breakfast, after meals and PRN; Toothbrush  -AD        SLP Rec. for Method of Medication Administration meds whole; with pudding or applesauce; as tolerated  -AD        Monitor for Signs of Aspiration no; notify SLP if any concerns  -AD        Anticipated Discharge Disposition (SLP) extended care facility  -AD        Patient/Family Concerns, Anticipated Discharge Disposition (SLP) Pt reports no concerns at this time.  -AD              User Key  (r) = Recorded By, (t) = Taken By, (c) = Cosigned By    Initials Name Effective Dates    Dahiana Riddle MS CCC-SLP 06/16/21 -                 EDUCATION  The patient has been educated in the following areas:   Dysphagia (Swallowing Impairment) Modified Diet Instruction.  Pt verbalizes understanding of results and in agreement with further assessment.             Time Calculation:    Time Calculation- SLP     Row Name 01/03/22 1935             Time Calculation- SLP    SLP Start Time 1531  -AD      SLP Stop Time 1546  -AD      SLP Time Calculation (min) 15 min  -AD      Total Timed Code Minutes- SLP 0 minute(s)  -AD      SLP Non-Billable Time (min) 0 min  -AD      SLP Received On 01/03/22  -AD              Untimed Charges    SLP Eval/Re-eval  ST Eval Oral Pharyng Swallow - 96721  -AD      [id number] Eval Oral Pharyng Swallow Minutes 15  -AD              Total Minutes    Untimed Charges Total Minutes 15  -AD       Total Minutes 15  -AD            User Key  (r) = Recorded By, (t) = Taken By, (c) = Cosigned By    Initials Name Provider Type    Dahiana Riddel MS CCC-SLP Speech and Language Pathologist                Therapy Charges for Today     Code Description Service Date Service Provider Modifiers Qty    [id number]  ST EVAL ORAL PHARYNG SWALLOW 1 1/3/2022 Dahiana Villa MS CCC-SLP GN 1               Dahiana Villa MS CCC-SLP  1/3/2022

## 2022-01-04 NOTE — CASE MANAGEMENT/SOCIAL WORK
Discharge Planning Assessment  VICKY Sharma     Patient Name: Joya Singh  MRN: [id number]  Today's Date: 1/4/2022    Admit Date: 1/3/2022     Discharge Needs Assessment     Row Name 01/04/22 1646       Living Environment    Lives With facility resident    Current Living Arrangements extended care facility       Discharge Needs Assessment    Outpatient/Agency/Support Group Needs skilled nursing facility    Discharge Facility/Level of Care Needs nursing facility, skilled    Provided Post Acute Provider List? N/A    N/A Provider List Comment Resident at [facility]    Provided Post Acute Provider Quality & Resource List? N/A    N/A Quality & Resource List Comment Resident at [facility]    Current Discharge Risk --  none               Discharge Plan     Row Name 01/04/22 1647       Plan    Plan Discharge back to [facility]    Patient/Family in Agreement with Plan yes    Plan Comments The patient is a long term care resident at [facility].  She will return there upon discharge.  I spoke with Gianna from [facility] and she confirmed that the patient resides there and there is a Medicaid bed hold for the patient. CM will continue to follow.              Continued Care and Services - Admitted Since 1/3/2022     Destination     Service Provider Request Status Selected Services Address Phone Fax Patient Preferred    [facility]  Accepted N/A [address] [facility] [address] [phone] --            Selected Continued Care - Episodes Includes selections from active Coordinated Care Management episodes    High Risk Care Management Episode start date: 8/13/2021 (Paused)   There are no active outsourced providers for this episode.                  Demographic Summary     Row Name 01/04/22 1644       General Information    Admission Type observation    Arrived From long-[facility]    Referral Source admission list    Reason for Consult discharge planning    Preferred Language English      Used During This Interaction no       Contact Information    Permission Granted to Share Info With                Functional Status    No documentation.                Psychosocial    No documentation.                Abuse/Neglect    No documentation.                Legal    No documentation.                Substance Abuse    No documentation.                Patient Forms    No documentation.                   Heydi Parry RN

## 2022-01-04 NOTE — PROGRESS NOTES
Adult Nutrition  Assessment/PES    Patient Name:  Joya Singh  YOB: 1940  MRN: [id number]  Admit Date:  1/3/2022    Assessment Date:  1/4/2022    Comments:  Noteed SLP following for biting/chewing/swallowing .   Recommend: May consider Boost Glucose control aid with supplementing po intake    Will cont to follow and monitor      This note completed with aid of nursing and review of medical record.      Reason for Assessment     Row Name 01/04/22 1313          Reason for Assessment    Reason For Assessment identified at risk by screening criteria  diff chew/swallowing on admit     Diagnosis pulmonary disease  PNA hx DM                Nutrition/Diet History     Row Name 01/04/22 1314          Nutrition/Diet History    Typical Food/Fluid Intake Called to room no answer.                Anthropometrics     Row Name 01/04/22 1315          Anthropometrics    Weight --  163.4#            Body Mass Index (BMI)    BMI Assessment BMI 25-29.9: overweight                Labs/Tests/Procedures/Meds     Row Name 01/04/22 1315          Labs/Procedures/Meds    Lab Results Reviewed reviewed     Lab Results Comments K 3.1 L, glu 211, 94, creat 1.4 H, mg 1.2 L            Diagnostic Tests/Procedures    Diagnostic Test/Procedure Reviewed reviewed            Medications    Pertinent Medications Reviewed reviewed     Pertinent Medications Comments levemir                  Estimated/Assessed Needs     Row Name 01/04/22 1316          Estimated/Assessed Needs    Additional Documentation Calorie Requirements (Group); Fluid Requirements (Group); Protein Requirements (Group)            Calorie Requirements    Estimated Calorie Need Method [place]-St Jeor     Estimated Calorie Requirement Comment 1405 kcal ( mifflin 1.2 )  158 gm CHO, 45% kcal            Protein Requirements    Est Protein Requirement Amount (gms/kg) 0.8 gm protein  59-74 gm pro ( .8-1 gm/kg pro )            Fluid Requirements    Estimated Fluid Requirement Method  other (see comments)  2201-1287 ml CHF hx guidelines                Nutrition Prescription Ordered     Row Name 01/04/22 1318          Nutrition Prescription PO    Current PO Diet Soft Texture     Common Modifiers GI Soft/[place]; Consistent Carbohydrate                Evaluation of Received Nutrient/Fluid Intake     Row Name 01/04/22 1319          Fluid Intake Evaluation    IV Fluid (mL) 950  insufficient data            PO Evaluation    Number of Days PO Intake Evaluated Insufficient Data                     Problem/Interventions:   Problem 1     Row Name 01/04/22 1319          Nutrition Diagnoses Problem 1    Problem 1 Biting/Chewing Difficulty     Etiology (related to) Factors Affecting Nutrition     Signs/Symptoms (evidenced by) Report/Observation                      Intervention Goal     Row Name 01/04/22 1320          Intervention Goal    General Meet nutritional needs for age/condition     PO Establish PO; PO intake (%)     PO Intake % 50 %  or greater     Weight No significant weight loss                Nutrition Intervention     Row Name 01/04/22 1321          Nutrition Intervention    RD/Tech Action Follow Tx progress                  Education/Evaluation     Row Name 01/04/22 1321          Education    Education Education not appropriate at this time            Monitor/Evaluation    Monitor Per protocol; I&O; PO intake; Pertinent labs; Weight; Symptoms                 Electronically signed by:  Cristiane Bush RD  01/04/22 13:22 EST

## 2022-01-04 NOTE — PLAN OF CARE
Goal Outcome Evaluation:  Plan of Care Reviewed With: patient        Progress: improving  Outcome Summary: vss. 2L o2 in place NC. purewick in place. saline locked. 1x dose Mg administered. K protocol in place. awaiting follow up labs. zofran given, relief noted. no emesis. tolerating diet at this itme. 1 unit PRBCs administered. see lab results for follow up. pt currently resting in room. iv antibiotics in place. no other complaints at this time.

## 2022-01-04 NOTE — PROGRESS NOTES
"Hospital Medicine Team    LOS 0 days      Patient Care Team:  Marcy Goncalves APRN as PCP - General (Family Medicine)  Christos Rob MD as Surgeon (General Surgery)  German Wylie MD as Surgeon (Orthopedic Surgery)  Yasmani Baugh MD as Consulting Physician (Nephrology)  Yasmani Baugh MD as Referring Physician (Nephrology)  Elieser Headley MD as Consulting Physician (Hematology and Oncology)  Rebecca Will RN as Ambulatory  (Population Health)  KenEmmanuel dutton MD as Consulting Physician (Gastroenterology)          Chief Complaint: Shortness of breath    Subjective      She feels slightly improved today but still with some mild shortness of breath and cough.  Nausea seems improved and she ate some breakfast.  Review of Systems   Constitutional: Positive for fatigue.   Respiratory: Positive for shortness of breath.        Objective    Vital Signs  Temp:  [98 °F (36.7 °C)-99.5 °F (37.5 °C)] 98.3 °F (36.8 °C)  Heart Rate:  [72-89] 73  Resp:  [18-28] 18  BP: (116-145)/(66-78) 145/73  Oxygen Therapy  SpO2: 96 %  Pulse Oximetry Type: Intermittent  Device (Oxygen Therapy): nasal cannula  Flow (L/min): 2  Flowsheet Rows      First Filed Value   Admission Height 154 cm (60.63\") Documented at 01/03/2022 0734   Admission Weight 76.2 kg (168 lb) Documented at 01/03/2022 0357              Physical Exam:  Physical Exam  Vitals reviewed.   Constitutional:       Comments: Appears chronically ill   HENT:      Head: Normocephalic.      Mouth/Throat:      Mouth: Mucous membranes are moist.   Eyes:      Pupils: Pupils are equal, round, and reactive to light.   Cardiovascular:      Rate and Rhythm: Normal rate.   Pulmonary:      Comments: Diminished breath sounds at the bases  Abdominal:      General: There is no distension.      Palpations: Abdomen is soft.   Skin:     General: Skin is warm and dry.   Psychiatric:         Mood and Affect: Mood normal.           Results Review: "    I reviewed the patient's new clinical results.    Results from last 7 days   Lab Units 01/04/22  0426 01/03/22  0424   WBC 10*3/mm3 1.86* 3.13*   HEMOGLOBIN g/dL 6.3* 7.3*   HEMATOCRIT % 20.7* 23.5*   PLATELETS 10*3/mm3 101* 125*     Results from last 7 days   Lab Units 01/04/22  0426 01/03/22  0424   SODIUM mmol/L 137 136   POTASSIUM mmol/L 3.1* 3.6   CHLORIDE mmol/L 104 100   CO2 mmol/L 24.3 25.6   BUN mg/dL 16 13   CREATININE mg/dL 1.42* 1.23*   CALCIUM mg/dL 6.9* 7.5*   BILIRUBIN mg/dL  --  0.8   ALK PHOS U/L  --  92   ALT (SGPT) U/L  --  8   AST (SGOT) U/L  --  12   GLUCOSE mg/dL 114* 68     Results from last 7 days   Lab Units 01/04/22 0426   MAGNESIUM mg/dL 1.2*       Microbiology Results (last 10 days)     Procedure Component Value - Date/Time    Blood Culture - Blood, Arm, Left [[id number]]  (Abnormal) Collected: 01/03/22 0610    Lab Status: Preliminary result Specimen: Blood from Arm, Left Updated: 01/04/22 1349     Blood Culture Abnormal Stain     Gram Stain Aerobic Bottle Gram positive cocci in clusters    Blood Culture - Blood, Arm, Left [[id number]]  (Normal) Collected: 01/03/22 0610    Lab Status: Preliminary result Specimen: Blood from Arm, Left Updated: 01/04/22 0700     Blood Culture No growth at 24 hours    COVID PRE-OP / PRE-PROCEDURE SCREENING ORDER (NO ISOLATION) - Swab, Nasopharynx [[id number]]  (Normal) Collected: 01/03/22 0610    Lab Status: Final result Specimen: Swab from Nasopharynx Updated: 01/03/22 0639    Narrative:      The following orders were created for panel order COVID PRE-OP / PRE-PROCEDURE SCREENING ORDER (NO ISOLATION) - Swab, Nasopharynx.  Procedure                               Abnormality         Status                     ---------                               -----------         ------                     COVID-19 and FLU A/B PCR...[[id number]]  Normal              Final result                 Please view results for these tests on the individual orders.    COVID-19 and  FLU A/B PCR - Swab, Nasopharynx [[id number]]  (Normal) Collected: 01/03/22 0610    Lab Status: Final result Specimen: Swab from Nasopharynx Updated: 01/03/22 0639     COVID19 Not Detected     Influenza A PCR Not Detected     Influenza B PCR Not Detected    Narrative:      Fact sheet for providers: https://www.fda.gov/media/580466/download    Fact sheet for patients: https://www.fda.gov/media/061303/download    Test performed by PCR.    Blood Culture ID, PCR - Blood, Arm, Left [[id number]]  (Abnormal) Collected: 01/03/22 0610    Lab Status: Final result Specimen: Blood from Arm, Left Updated: 01/04/22 1502     BCID, PCR Staph spp, not aureus or lugdunesis. Identification by BCID2 PCR.     BOTTLE TYPE Aerobic Bottle    Urine Culture - Urine, Urine, Clean Catch [[id number]]  (Abnormal) Collected: 01/03/22 0505    Lab Status: Preliminary result Specimen: Urine, Clean Catch Updated: 01/04/22 1400     Urine Culture >100,000 CFU/mL Gram Negative Bacilli      <25,000 CFU/mL Mixed Kedar Isolated    Narrative:      Specimen contains mixed organisms of questionable pathogenicity which indicates contamination with commensal kedar.  Further identification is unlikely to provide clinically useful information.  Suggest recollection.    S. Pneumo Ag Urine or CSF - Urine, Urine, Clean Catch [[id number]]  (Normal) Collected: 01/03/22 0505    Lab Status: Final result Specimen: Urine, Clean Catch Updated: 01/03/22 0653     Strep Pneumo Ag Negative    Legionella Antigen, Urine - Urine, Urine, Clean Catch [[id number]]  (Normal) Collected: 01/03/22 0505    Lab Status: Final result Specimen: Urine, Clean Catch Updated: 01/03/22 0653     LEGIONELLA ANTIGEN, URINE Negative              Results for orders placed during the hospital encounter of 08/06/21    Adult Transthoracic Echo Complete W/ Cont if Necessary Per Protocol    Interpretation Summary  · Left ventricular wall thickness is consistent with mild to moderate septal asymmetric  hypertrophy.  · Calculated left ventricular EF = 63.8% Estimated left ventricular EF was in agreement with the calculated left ventricular EF. Left ventricular systolic function is normal.  · Mildly reduced right ventricular systolic function noted.      XR Chest 2 View    Result Date: 1/3/2022  There are patchy densities in the right lower lobe consistent with infiltrates. Left lung is clear. Signer Name: Henry Mendiola MD  Signed: 1/3/2022 5:37 AM  Workstation Name: [facility]  Radiology [facility]    CT Abdomen Pelvis With Contrast    Result Date: 1/3/2022  Small right greater than left effusions with right base atelectasis and infiltrate No acute findings in the abdomen and pelvis Signer Name: Henry Mendiola MD  Signed: 1/3/2022 5:39 AM  Workstation Name: [facility]      ECG/EMG Results (most recent)     None            X-rays, labs reviewed personally by physician.    Medication Review:   I have reviewed the patient's current medication list    Scheduled Meds  amiodarone, 200 mg, Oral, Daily  apixaban, 5 mg, Oral, BID  atorvastatin, 10 mg, Oral, Nightly  cefepime, 2 g, Intravenous, Q12H  desvenlafaxine, 50 mg, Oral, Daily  gabapentin, 100 mg, Oral, TID  insulin detemir, 10 Units, Subcutaneous, Nightly  ipratropium-albuterol, 3 mL, Nebulization, 4x Daily - RT  levothyroxine, 88 mcg, Oral, Daily  metroNIDAZOLE, 500 mg, Intravenous, Q8H  midodrine, 2.5 mg, Oral, TID AC  pantoprazole, 40 mg, Oral, QAM  Pharmacy Consult for Metronidazole IV, 500 mg, Does not apply, Q8H  sennosides-docusate, 2 tablet, Oral, Nightly  sodium chloride, 10 mL, Intravenous, Q12H  tamsulosin, 0.4 mg, Oral, Nightly        Meds Infusions       Meds PRN  •  acetaminophen **OR** acetaminophen **OR** acetaminophen  •  benzonatate  •  bisacodyl  •  bisacodyl  •  guaiFENesin-dextromethorphan  •  HYDROcodone-acetaminophen  •  ipratropium-albuterol  •  melatonin  •  ondansetron **OR** ondansetron  •   polyethylene glycol  •  potassium chloride **OR** potassium chloride **OR** potassium chloride  •  promethazine  •  Insert peripheral IV **AND** sodium chloride  •  sodium chloride  •  sodium chloride            Assessment/Plan  ([facility])    Active Hospital Problems:  Active Hospital Problems    Diagnosis  POA   • Pneumonia of right lower lobe due to infectious organism [J18.9]  Yes      Resolved Hospital Problems   No resolved problems to display.     Acute hypoxic respiratory failure d/t pneumonia  - possible aspiration pneumonia (h/o recurrent aspiration)  - blood cx- 1/4 + staph spp not aureus possible contaminant will continue to follow  - strep and legionella antigens- negative   - continue Cefepime and flagyl for now   - supplemental oxygen 2L remains and wean as able  - duo-nebs scheduled and prn  - SLP following     Acute GNR UTI- POA  - urine cx- GNR   - continue abx as above and follow up final Cx      Nausea, Vomiting  - likely d/t above  - CT abd/pel unremarkable  - improving supportive care  - follows with Dr. 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  History of transfusional iron overload:  - hgb 6.3, this am  - transfuse PRBC  - no current signs of blood loss  - follow up CBC in am      CKD stage IV  - Baseline creatinine variable from 1.6-3.1  - Cr more stable than baseline  - follow BMP        Chronic dCHF, HTN, HLP, a flutter  - Last echo 8/6/2021 with EF 63.8%, mildly reduced RV function, mild to moderate septal asymmetry and hypertrophy, trace to mild TR  - continue home amiodarone, eliquis     Hypothyroidism: Last TSH elevated with free T4 normal on 8/6/2021, add home levothyroxine 88 mcg daily     DM2 with diabetic retinopathy/diabetic gastroparesis in obese: Last A1c 6.1% on 8/6/2021  -on levemir and SSI  -follow and adjust     History of DVT     History of TIA     History of AARON with chronic nocturnal hypoxia: Not on CPAP, uses 2L with  sleep     Bell's palsy  - Chronic left facial droop     Depression  - home meds     DVT ppx-eliquis    This patient has been examined wearing appropriate Personal Protective Equipment . 01/04/22      Plan for disposition:back to rehab possibly 1-2 days pending improvemet    Electronically signed by Maury Denton DO, 01/04/22, 15:38 EST.

## 2022-01-04 NOTE — DISCHARGE PLACEMENT REQUEST
"Marycruz Salazar (81 y.o. Female)             Date of Birth Social Security Number Address Home Phone MRN    1940  [address] [facility] [address] [phone] [id number]    Orthodoxy Marital Status             [facility]        Admission Date Admission Type Admitting Provider Attending Provider Department, Room/Bed    1/3/22 Emergency Vladimir Mcintosh DO [place], [place], DO [facility] MED SURG, 1409/1    Discharge Date Discharge Disposition Discharge Destination                         Attending Provider: Vladimir Mcintosh DO    Allergies: Baclofen, Codeine, Lisinopril, Morphine, Penicillins    Isolation: None   Infection: None   Code Status: No CPR   Advance Care Planning Activity    Ht: 157.5 cm (62\")   Wt: 74.1 kg (163 lb 6.4 oz)    Admission Cmt: None   Principal Problem: None                Active Insurance as of 1/3/2022     Primary Coverage     Payor Plan Insurance Group Employer/Plan Group    HUMANA MEDICARE REPLACEMENT HUMANA MEDICARE REPLACEMENT [id number]     Payor Plan Address Payor Plan Phone Number Payor Plan Fax Number Effective Dates    [address] [address] [phone]  1/1/2015 - None Entered    [facility] [address]       Subscriber Name Subscriber Birth Date Member ID       MARYCRUZ SALAZAR 1940 [id number]           Secondary Coverage     Payor Plan Insurance Group Employer/Plan Group    [id number]     Coverage Address Coverage Phone Number Coverage Fax Number Effective Dates    [address] [facility] [address] [phone]  3/29/2021 - None Entered    [facility] [id number]       Subscriber Name Subscriber Birth Date Member ID       MARYCRUZ SALAZAR 1940 [id number]           Tertiary Coverage     Payor Plan Insurance Group Employer/Plan Group    [place]      Payor Plan Address Payor Plan Phone Number Payor Plan Fax Number Effective Dates    [address] [phone]  8/1/2021 - None Entered    [facility] [id number]       Subscriber Name " Subscriber Birth Date Member ID       MARYCRUZ SALAZAR 1940 [id number]                 Emergency Contacts      (Rel.) Home Phone Work Phone Mobile Phone    [email or web address] (Relative) -- -- [phone]    Isaac aSlazar (Son) [phone] -- --    Junior Salazar (Son) -- -- [phone]

## 2022-01-04 NOTE — PLAN OF CARE
Goal Outcome Evaluation:  Plan of Care Reviewed With: patient        Progress: no change  Outcome Summary: Pt had c/o nausea, PRN Phenergan given, relief noted. No emesis this shift. Pt remains on 2L NC. Pure wick in place. NS infusing @75ml/hr.

## 2022-01-05 NOTE — PLAN OF CARE
"Goal Outcome Evaluation:  Plan of Care Reviewed With: patient        Progress: no change  Outcome Summary: Pt now on 1L NC, continues to c/o nausea and states, \"I'm so sick\" although she has not vomited. PRN Zofran and Phenergan given. Pending AM labs.  "

## 2022-01-05 NOTE — PLAN OF CARE
Problem: Adult Inpatient Plan of Care  Goal: Plan of Care Review  Outcome: Ongoing, Not Progressing  Flowsheets (Taken 1/5/2022 0841)  Plan of Care Reviewed With: (RN, Mickey)   patient   other (see comments)  Outcome Summary: SLP: Attempted further evaluation today. Pt refusing breakfast, appears more confused than the previous 2 days. Able to get her to take most of her pills with two bites of applesauce. C/o nausea and drying heaving. Did vomit small amount of phlegm but no pills or applesauce noted. Recommend to continue with soft diet as tolerated and thins, meds whole in applesauce as tolerated. No further evaluation recommended at this time due to pt noncompliance.

## 2022-01-05 NOTE — CASE MANAGEMENT/SOCIAL WORK
Continued Stay Note  VICKY Sharma     Patient Name: Joya Singh  MRN: [id number]  Today's Date: 1/5/2022    Admit Date: 1/3/2022     Discharge Plan     Row Name 01/05/22 1501       Plan    Plan plan return to [place] - medicaid bed hold    Plan Comments Follow up visit, patient is sleeping. Spoke with patients son Isaac Singh via phone. Update of status given. IMM explained and understanding verbalized. CM will continue to follow.    Row Name 01/05/22 1308       Plan    Plan plan return to [facility] - medicaid bed hold    Plan Comments Per chart review, patient receiving 2nd unit PRBC and BC pending. Continue to follow and assist with return back to [facility].               Discharge Codes    No documentation.                     Alan Gruber RN

## 2022-01-05 NOTE — CASE MANAGEMENT/SOCIAL WORK
Continued Stay Note  VICKY Sharma     Patient Name: Joya Singh  MRN: [id number]  Today's Date: 1/5/2022    Admit Date: 1/3/2022     Discharge Plan     Row Name 01/05/22 1308       Plan    Plan plan return to [facility] - medicaid bed hold    Plan Comments Per chart review, patient receiving 2nd unit PRBC and BC pending. Continue to follow and assist with return back to [facility].               Discharge Codes    No documentation.                     Alan Gruber RN

## 2022-01-05 NOTE — PROGRESS NOTES
"Hospital Medicine Team    LOS 0 days      Patient Care Team:  Marcy Goncalves APRN as PCP - General (Family Medicine)  Christos Rob MD as Surgeon (General Surgery)  German Wylie MD as Surgeon (Orthopedic Surgery)  Yasmani Baugh MD as Consulting Physician (Nephrology)  Yasmani Baugh MD as Referring Physician (Nephrology)  Elieser Headley MD as Consulting Physician (Hematology and Oncology)  Rebecca Will RN as Ambulatory  (Population Health)  KenEmmanuel dutton MD as Consulting Physician (Gastroenterology)          Chief Complaint: Shortness of breath    Subjective      Complaining of nausea which is a chronic issue for her.  Also seems slightly more disoriented this morning.  Breathing is stable.  Review of Systems   Constitutional: Positive for fatigue.   Respiratory: Positive for shortness of breath.        Objective    Vital Signs  Temp:  [97.9 °F (36.6 °C)-99.1 °F (37.3 °C)] 98.1 °F (36.7 °C)  Heart Rate:  [70-83] 70  Resp:  [18-20] 18  BP: (128-161)/(58-82) 144/69  Oxygen Therapy  SpO2: 95 %  Pulse Oximetry Type: Intermittent  Device (Oxygen Therapy): nasal cannula  Device (Oxygen Therapy): nasal cannula  Flow (L/min): 1  Flowsheet Rows      First Filed Value   Admission Height 154 cm (60.63\") Documented at 01/03/2022 0734   Admission Weight 76.2 kg (168 lb) Documented at 01/03/2022 0357              Physical Exam:  Physical Exam  Vitals reviewed.   Constitutional:       Comments: Appears chronically ill   HENT:      Head: Normocephalic.      Mouth/Throat:      Mouth: Mucous membranes are moist.   Eyes:      Pupils: Pupils are equal, round, and reactive to light.   Cardiovascular:      Rate and Rhythm: Normal rate.   Pulmonary:      Comments: Diminished breath sounds at the bases  Abdominal:      General: There is no distension.      Palpations: Abdomen is soft.   Skin:     General: Skin is warm and dry.   Neurological:      Comments: AO x2 but some " mild confusion at times   Psychiatric:         Mood and Affect: Mood normal.           Results Review:    I reviewed the patient's new clinical results.    Results from last 7 days   Lab Units 01/05/22 0549 01/04/22  1600 01/04/22 0426 01/03/22 0424 01/03/22 0424   WBC 10*3/mm3 1.80*  --  1.86*  --  3.13*   HEMOGLOBIN g/dL 7.2* 7.6* 6.3*   < > 7.3*   HEMATOCRIT % 23.8* 24.6* 20.7*   < > 23.5*   PLATELETS 10*3/mm3 100*  --  101*  --  125*    < > = values in this interval not displayed.     Results from last 7 days   Lab Units 01/05/22 0549 01/04/22 1958 01/04/22 0426 01/03/22 0424 01/03/22 0424   SODIUM mmol/L 137  --  137  --  136   POTASSIUM mmol/L 3.8 3.9 3.1*   < > 3.6   CHLORIDE mmol/L 106  --  104  --  100   CO2 mmol/L 23.6  --  24.3  --  25.6   BUN mg/dL 15  --  16  --  13   CREATININE mg/dL 1.36*  --  1.42*  --  1.23*   CALCIUM mg/dL 7.3*  --  6.9*  --  7.5*   BILIRUBIN mg/dL  --   --   --   --  0.8   ALK PHOS U/L  --   --   --   --  92   ALT (SGPT) U/L  --   --   --   --  8   AST (SGOT) U/L  --   --   --   --  12   GLUCOSE mg/dL 44*  --  114*  --  68    < > = values in this interval not displayed.     Results from last 7 days   Lab Units 01/05/22 0549 01/04/22 0426   MAGNESIUM mg/dL 1.6 1.2*       Microbiology Results (last 10 days)     Procedure Component Value - Date/Time    Blood Culture - Blood, Arm, Left [[id number]]  (Abnormal) Collected: 01/03/22 0610    Lab Status: Final result Specimen: Blood from Arm, Left Updated: 01/05/22 1039     Blood Culture Staphylococcus, coagulase negative     Isolated from Aerobic Bottle     Gram Stain Aerobic Bottle Gram positive cocci in clusters    Narrative:      Probable contaminant requires clinical correlation, susceptibility not performed unless requested by physician.      Blood Culture - Blood, Arm, Left [[id number]]  (Normal) Collected: 01/03/22 0610    Lab Status: Preliminary result Specimen: Blood from Arm, Left Updated: 01/05/22 0700     Blood  Culture No growth at 2 days    COVID PRE-OP / PRE-PROCEDURE SCREENING ORDER (NO ISOLATION) - Swab, Nasopharynx [[id number]]  (Normal) Collected: 01/03/22 0610    Lab Status: Final result Specimen: Swab from Nasopharynx Updated: 01/03/22 0639    Narrative:      The following orders were created for panel order COVID PRE-OP / PRE-PROCEDURE SCREENING ORDER (NO ISOLATION) - Swab, Nasopharynx.  Procedure                               Abnormality         Status                     ---------                               -----------         ------                     COVID-19 and FLU A/B PCR...[[id number]]  Normal              Final result                 Please view results for these tests on the individual orders.    COVID-19 and FLU A/B PCR - Swab, Nasopharynx [[id number]]  (Normal) Collected: 01/03/22 0610    Lab Status: Final result Specimen: Swab from Nasopharynx Updated: 01/03/22 0639     COVID19 Not Detected     Influenza A PCR Not Detected     Influenza B PCR Not Detected    Narrative:      Fact sheet for providers: https://www.fda.gov/media/668630/download    Fact sheet for patients: https://www.fda.gov/media/511379/download    Test performed by PCR.    Blood Culture ID, PCR - Blood, Arm, Left [[id number]]  (Abnormal) Collected: 01/03/22 0610    Lab Status: Final result Specimen: Blood from Arm, Left Updated: 01/04/22 1502     BCID, PCR Staph spp, not aureus or lugdunesis. Identification by BCID2 PCR.     BOTTLE TYPE Aerobic Bottle    Urine Culture - Urine, Urine, Clean Catch [[id number]]  (Abnormal) Collected: 01/03/22 0505    Lab Status: Preliminary result Specimen: Urine, Clean Catch Updated: 01/05/22 0922     Urine Culture >100,000 CFU/mL Gram Negative Bacilli      <25,000 CFU/mL Mixed Kedar Isolated    Narrative:      Specimen contains mixed organisms of questionable pathogenicity which indicates contamination with commensal kedar.  Further identification is unlikely to provide clinically useful information.   Suggest recollection.    S. Pneumo Ag Urine or CSF - Urine, Urine, Clean Catch [[id number]]  (Normal) Collected: 01/03/22 0505    Lab Status: Final result Specimen: Urine, Clean Catch Updated: 01/03/22 0653     Strep Pneumo Ag Negative    Legionella Antigen, Urine - Urine, Urine, Clean Catch [[id number]]  (Normal) Collected: 01/03/22 0505    Lab Status: Final result Specimen: Urine, Clean Catch Updated: 01/03/22 0653     LEGIONELLA ANTIGEN, URINE Negative              Results for orders placed during the hospital encounter of 08/06/21    Adult Transthoracic Echo Complete W/ Cont if Necessary Per Protocol    Interpretation Summary  · Left ventricular wall thickness is consistent with mild to moderate septal asymmetric hypertrophy.  · Calculated left ventricular EF = 63.8% Estimated left ventricular EF was in agreement with the calculated left ventricular EF. Left ventricular systolic function is normal.  · Mildly reduced right ventricular systolic function noted.      XR Chest 2 View    Result Date: 1/3/2022  There are patchy densities in the right lower lobe consistent with infiltrates. Left lung is clear. Signer Name: Henry Mendiola MD  Signed: 1/3/2022 5:37 AM  Workstation Name: [facility]  Radiology [facility]    CT Abdomen Pelvis With Contrast    Result Date: 1/3/2022  Small right greater than left effusions with right base atelectasis and infiltrate No acute findings in the abdomen and pelvis Signer Name: Henry Mendiola MD  Signed: 1/3/2022 5:39 AM  Workstation Name: [facility]      ECG/EMG Results (most recent)     None            X-rays, labs reviewed personally by physician.    Medication Review:   I have reviewed the patient's current medication list    Scheduled Meds  amiodarone, 200 mg, Oral, Daily  apixaban, 5 mg, Oral, BID  atorvastatin, 10 mg, Oral, Nightly  cefTRIAXone, 2 g, Intravenous, Q24H  desvenlafaxine, 50 mg, Oral, Daily  gabapentin, 100 mg, Oral,  TID  insulin aspart, 0-7 Units, Subcutaneous, TID AC  ipratropium-albuterol, 3 mL, Nebulization, 4x Daily - RT  levothyroxine, 88 mcg, Oral, Daily  metroNIDAZOLE, 500 mg, Oral, Q8H  midodrine, 2.5 mg, Oral, TID AC  pantoprazole, 40 mg, Oral, QAM  sennosides-docusate, 2 tablet, Oral, Nightly  sodium chloride, 10 mL, Intravenous, Q12H  tamsulosin, 0.4 mg, Oral, Nightly        Meds Infusions       Meds PRN  •  acetaminophen **OR** acetaminophen **OR** acetaminophen  •  benzonatate  •  bisacodyl  •  bisacodyl  •  dextrose  •  dextrose  •  glucagon (human recombinant)  •  guaiFENesin-dextromethorphan  •  HYDROcodone-acetaminophen  •  ipratropium-albuterol  •  melatonin  •  ondansetron **OR** ondansetron  •  polyethylene glycol  •  potassium chloride **OR** potassium chloride **OR** potassium chloride  •  promethazine  •  Insert peripheral IV **AND** sodium chloride  •  sodium chloride  •  sodium chloride            Assessment/Plan  (MDM)    Active Hospital Problems:  Active Hospital Problems    Diagnosis  POA   • Pneumonia of right lower lobe due to infectious organism [J18.9]  Yes      Resolved Hospital Problems   No resolved problems to display.     Acute hypoxic respiratory failure d/t pneumonia  - possible aspiration pneumonia (h/o recurrent aspiration)  - blood cx- 1/4 + staph spp not aureus or lugdunensis potential contaminant will continue to follow  - strep and legionella antigens- negative   - stop cefepime given confusion  - change to Rocephin continue Flagyl  - WBC lower but procal improving  - supplemental oxygen 2L remains and wean as able  - duo-nebs scheduled and prn  - SLP following     Acute GNR UTI- POA  - urine cx- GNR   - continue abx as above and follow up final Cx      Nausea, Vomiting  - likely d/t above  - CT abd/pel unremarkable  - continue supportive care   - follows with Dr. 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  History of transfusional iron  overload:  - hgb 7.2,  - transfuse 1 unit PRBC thus far, will give an additional unit today  - no current signs of blood loss  - follow up CBC in am      CKD stage IV  - Baseline creatinine variable from 1.6-3.1  - Cr more stable than baseline  - follow BMP      Chronic dCHF, HTN, HLP, a flutter  - Last echo 8/6/2021 with EF 63.8%, mildly reduced RV function, mild to moderate septal asymmetry and hypertrophy, trace to mild TR  - continue home amiodarone, eliquis     Hypothyroidism: Last TSH elevated with free T4 normal on 8/6/2021,   - levothyroxine 88 mcg daily     DM2 with diabetic retinopathy/diabetic gastroparesis in obese: Last A1c 6.1% on 8/6/2021  -Hypoglycemia noted this a.m.  -Follow-up Levemir and continue SSI alone  -follow and adjust     History of DVT     History of TIA     History of AARON with chronic nocturnal hypoxia: Not on CPAP, uses 2L with sleep     Bell's palsy  - Chronic left facial droop     Depression  - home meds     DVT ppx-eliquis    This patient has been examined wearing appropriate Personal Protective Equipment . 01/05/22      Plan for disposition: Back to rehab given CBC stability and culture results possibly 1 to 2 days    Electronically signed by Maury Denton DO, 01/05/22, 12:38 EST.

## 2022-01-05 NOTE — THERAPY DISCHARGE NOTE
Acute Care - Speech Language Pathology   Swallow Re-Evaluation/Discharge VICKY Sharma     Patient Name: Joya Singh  : 1940  MRN: [id number]  Today's Date: 2022               Admit Date: 1/3/2022    Visit Dx:    ICD-10-CM ICD-9-CM   1. Pneumonia of right lower lobe due to infectious organism  J18.9 486   2. Hypoxia  R09.02 799.02   3. Urinary tract infection in elderly patient  N39.0 599.0   4. Anemia assoc with myelodysplastic syndrome treated with erythropoietin (Formerly [facility])  D46.9 285.22    D63.0 238.75   5. Dysphagia, unspecified type  R13.10 787.20     Patient Active Problem List   Diagnosis   • Arthritis   • Chronic back pain   • Chronic anemia   • Anxiety and depression   • Type 2 diabetes mellitus with neurologic complication ([facility])   • Brittle diabetes mellitus ([facility])   • Dizzy   • Mixed hyperlipidemia   • Essential hypertension   • Insomnia with sleep apnea   • Obstructive sleep apnea syndrome   • Peripheral neuropathy   • Diabetic gastroparesis ([facility])   • Primary localized osteoarthrosis of left shoulder region   • Rotator cuff tendonitis   • Acquired hypothyroidism   • Anxiety   • History of biliary T-tube placement   • CKD stage 3 due to type 2 diabetes mellitus ([facility])   • Complex tear of medial meniscus of right knee as current injury   • Insufficiency fracture of tibia   • Primary osteoarthritis of left knee   • Orthostatic hypotension   • Tendinitis of right rotator cuff   • AC joint arthropathy   • Subacromial impingement of right shoulder   • Right carpal tunnel syndrome   • Anemia requiring transfusions   • Monoclonal gammopathy of unknown significance (MGUS)   • Myelodysplastic syndrome with 5q deletion ([facility])   • History of deep venous thrombosis (DVT) of distal vein of left lower extremity   • Moderate episode of recurrent major depressive disorder ([facility])   • Chronic diastolic CHF (congestive heart failure) ([facility])   • Knee pain   • Primary osteoarthritis of right knee   • Iron overload,  transfusional   • Uncontrolled type 2 diabetes mellitus with hyperglycemia ([facility])   • Localized edema   • Type 2 diabetes mellitus with diabetic neuropathy ([facility])   • Vitamin D deficiency   • Subacromial bursitis of left shoulder joint   • Gastroesophageal reflux disease with esophagitis without hemorrhage   • Esophageal dysphagia   • Anemia in neoplastic disease   • Leukocytes in urine   • Atrial flutter (HCC)   • Moderate malnutrition ([facility])   • Polypharmacy   • Pneumonia due to COVID-19 virus   • Acute kidney injury ([facility])   • Cytokine release syndrome, grade 2   • Pneumonia of right lower lobe due to infectious organism     Past Medical History:   Diagnosis Date   • Allergic rhinitis    • Anemia    • Anxiety    • Appetite absent    • Arthritis    • Asthma    • Back pain    • Bell's palsy    • Black tarry stools    • Blood in stool    • Chronic fatigue    • CKD (chronic kidney disease) stage 3, GFR 30-59 ml/min ([facility])    • Community acquired pneumonia of left lung 10/25/2018   • Cough    • Depression    • Diabetes mellitus ([facility])     LAST A1C 6   • Diabetic gastroparesis ([facility]) 2/19/2016   • Difficulty walking    • Excessive urination at night    • Frequent urination    • GERD (gastroesophageal reflux disease)    • GI bleed    • Gout    • H/O blood clots     LEFT LEG 7 OR 8 YEARS AGO   • Heat intolerance    • History of fall 10/2018   • History of prior pregnancies     x8, miscarriage 5   • History of transfusion 11/2018    due to anemia   • Hyperlipidemia    • Hypertension    • Hypothyroidism    • Myelodysplastic syndrome with isolated del(5q) chromosomal abnormality ([facility])    • Normal coronary arteries     by cath 2013   • Orthostatic hypotension    • AARON (obstructive sleep apnea)     DOESNT WEAR REGULARLY   • Paraplegic immobility syndrome    • Pneumonia    • PONV (postoperative nausea and vomiting)    • Skin cancer    • Stroke ([facility])     Several mini-strokes   • TIA (transient ischemic attack)     LAST TIA JULY 2017    • Urination pain    • UTI (urinary tract infection)     Dec 2018 and Jan 2019     Past Surgical History:   Procedure Laterality Date   • BACK SURGERY      HARDWARE   • CHOLECYSTECTOMY      OPEN   • COLONOSCOPY  2011    due for repeat in 2021   • COLONOSCOPY N/A 10/28/2018    Procedure: COLONOSCOPY;  Surgeon: Emmanuel Rogers MD;  Location:  [facility];  Service: Gastroenterology   • ENDOSCOPY N/A 10/26/2018    Procedure: ESOPHAGOGASTRODUODENOSCOPY;  Surgeon: Emmanuel Rogers MD;  Location:  [facility];  Service: Gastroenterology   • ENDOSCOPY N/A 5/13/2021    Procedure: ESOPHAGOGASTRODUODENOSCOPY, biopsy, dilatation;  Surgeon: Emmanuel Rogers MD;  Location:  [facility];  Service: Gastroenterology;  Laterality: N/A;  Esophagitis; Dilation- no stricture; Biopsy- esophagus   • HYSTERECTOMY      PARTIAL    • KNEE SURGERY     • NECK SURGERY     • SPINE SURGERY     • TUMOR REMOVAL Left     Leg   • UPPER GASTROINTESTINAL ENDOSCOPY  2014    gastritis.  done by dr. hastings       SLP Recommendation and Plan  SLP Swallowing Diagnosis: functional oral phase, functional pharyngeal phase, esophageal dysphagia, other (see comments) (anxiety adversely affecting participation/performance) (01/05/22 0851)  SLP Diet Recommendation: soft textures, whole, thin liquids, other (see comments) (GI soft/bland) (01/05/22 0851)     Monitor for Signs of Aspiration: no, notify SLP if any concerns (01/05/22 0851)     Swallow Criteria for Skilled Therapeutic Interventions Met: no problems identified which require skilled intervention, baseline status, other (see comments) (decreased cooperation) (01/05/22 0851)  Anticipated Discharge Disposition (SLP): extended care facility (01/05/22 0851)     Therapy Frequency (Swallow): evaluation only (01/05/22 0851)              Anticipated Discharge Disposition (SLP): extended care facility (01/05/22 0851)  Patient/Family Concerns, Anticipated Discharge Disposition (SLP): Pt is  unable to state any concerns at this time due to confusion and decreased participation. (01/05/22 0851)                   Plan of Care Reviewed With: patient, other (see comments) (RN, Mickey) (01/05/22 0853)  Outcome Summary: SLP: Attempted further evaluation today. Pt refusing breakfast, appears more confused than the previous 2 days. Able to get her to take most of her pills with two bites of applesauce. C/o nausea and drying heaving. Did vomit small amount of phlegm but no pills or applesauce noted. Recommend to continue with soft diet as tolerated and thins, meds whole in applesauce as tolerated. No further evaluation recommended at this time due to pt noncompliance. (01/05/22 0853)    SWALLOW EVALUATION (last 72 hours)     SLP Adult Swallow Evaluation     Row Name 01/05/22 0851 01/04/22 1530 01/04/22 1238 01/04/22 1002 01/03/22 1546       Rehab Evaluation    Document Type re-evaluation  -AD -- -- -- evaluation  -AD    Subjective Information complains of; nausea/vomiting  -AD -- -- -- complains of; nausea/vomiting  -AD    Patient Observations alert; poorly cooperative  confused  -AD -- -- -- alert  somewhat cooperative; refuses puree and solid foods at this time  -AD    Patient/Family/Caregiver Comments/Observations Pt seen at bedside with RN present and attempting to assess and administer meds. Pt not cooperative and states she just can't. It's making her sick.  -AD -- -- -- Pt seen in bed in upright position. Initially had blanket over her head. Pt stating she is nauseated from her pills given.  -AD    Session Not Performed -- patient unavailable for evaluation  -AD patient unavailable for evaluation  -AD patient/family declined, not feeling well  -AD --    Evaluation Not Performed, Comment -- Pt on phone and other staff in room with pt.  -AD Currently sleeping. Will check back later.  -AD Pt just had new IV placed. Declined at this time. Will follow up at lunch.  -AD --    Patient Effort poor  -AD -- -- --  fair  -AD    Symptoms Noted During/After Treatment other (see comments)  anxiety and nausea; mild confusion  -AD -- -- -- other (see comments)  reports nausea and fatigue  -AD       General Information    Patient Profile Reviewed -- -- -- -- yes  -AD    Pertinent History Of Current Problem -- -- -- -- Pt is a 80 y/o female admitted with acute hypoxic respiratory failure due to PNA, acute UTI, nausea and vomiting, CKD stage 4, chr dCHF, HTN, aflutter. Swallow eval ordered due to concerns for possible aspiration. Per RN, she completed a dysphagia screen and pt passed. Able to take meds in applesauce w/o difficulty.  -AD    Current Method of Nutrition -- -- -- -- NPO  -AD    Precautions/Limitations, Vision -- -- -- -- WFL; for purposes of eval  -AD    Precautions/Limitations, Hearing -- -- -- -- WFL; for purposes of eval  -AD    Prior Level of Function-Communication -- -- -- -- cognitive-linguistic impairment  mild deficits in past; oriented to person, place and situation.  -AD    Prior Level of Function-Swallowing -- -- -- -- esophageal concerns  GI soft diet w/thins  -AD    Plans/Goals Discussed with -- -- -- -- patient; agreed upon  -AD    Barriers to Rehab -- -- -- -- medically complex; cognitive status  -AD    Patient's Goals for Discharge -- -- -- -- patient did not state  -AD       Pain    Additional Documentation --  reports pain in her stomach to RN  -AD -- -- -- --  reports nausea but no pain reported  -AD       Oral Motor Structure and Function    Oral Lesions or Structural Abnormalities and/or variants -- -- -- -- none  -AD    Dentition Assessment -- -- -- -- missing teeth; poor oral hygiene  no lower molars and upper missing teeth as well  -AD    Secretion Management -- -- -- -- WNL/WFL  -AD    Mucosal Quality -- -- -- -- moist, healthy  -AD    Volitional Swallow -- -- -- -- WFL  -AD    Volitional Cough -- -- -- -- WFL  -AD       Oral Musculature and Cranial Nerve Assessment    Oral Motor General  Assessment -- -- -- -- generalized oral motor weakness; mandibular impairment; oral labial or buccal impairment; lingual impairment; vocal impairment  -AD    Mandibular Impairment Detail, Cranial Nerve V (Trigeminal) -- -- -- -- reduced strength on right  -AD    Oral Labial or Buccal Impairment, Detail, Cranial Nerve VII (Facial): -- -- -- -- right labial droop; other (see comments); reduced ROM  reduced ROM of elevation of upper lip on right  -AD    Lingual Impairment, Detail. Cranial Nerves IX, XII (Glossopharyngeal and Hypoglossal) -- -- -- -- other (see comments)  slight deviation to the right upon protrusion  -AD    Vocal Impairment, Detail. Cranial Nerve X (Vagus) -- -- -- -- vocal quality abnormality (see comments); other (see comments)  hoarse vocal quality  -AD    Oral Motor, Comment -- -- -- -- right labial weakness and tongue deviation to right, chronic  -AD       General Eating/Swallowing Observations    Respiratory Support Currently in Use nasal cannula  -AD -- -- -- nasal cannula  -AD    Eating/Swallowing Skills fed by SLP  meds whole in applesauce  -AD -- -- -- self-fed; appropriate self-feeding skills observed  -AD    Positioning During Eating upright 90 degree; upright in bed  -AD -- -- -- upright in bed; other (see comments)  near 90 degrees  -AD    Utensils Used spoon; straw  -AD -- -- -- straw  -AD    Consistencies Trialed thin liquids; pureed  meds whole in applesauce  -AD -- -- -- thin liquids  refuses po trials of food (solids and puree)  -AD       Respiratory    Respiratory Status WFL; during swallowing/eating  expiratory wheezing heard at rest prior to po intake  -AD -- -- -- WFL; during swallowing/eating  -AD       Clinical Swallow Eval    Oral Prep Phase WFL  -AD -- -- -- WFL  -AD    Oral Transit WFL  -AD -- -- -- WFL  -AD    Oral Residue WFL  -AD -- -- -- WFL  -AD    Pharyngeal Phase no overt signs/symptoms of pharyngeal impairment  -AD -- -- -- no overt signs/symptoms of pharyngeal  impairment  -AD    Esophageal Phase --  gagging on medication; reports of nausea, dry heaving  -AD -- -- -- unremarkable  -AD    Clinical Swallow Evaluation Summary Pt is able to demonstrate continued tolerance of thins from straw. Only willing to take 2 bites of meds in applesauce. Oropharyngeal stages were unremarkable. Pt with gagging and nausea reported. Mild emesis with only small amount of tannish phlegm noted in the basin. Pt refuses any further attempts at medications or po intake. Will discontinue evaluation at this time due to refusal to participate.  -AD -- -- -- Pt with functional swallow on thins from straw. Refuses any further po intake at this time due to nausea. Limited results due to limited participation. Recommend to further evaluate tomorrow when pt willing to participate.  -AD       SLP Evaluation Clinical Impression    SLP Swallowing Diagnosis functional oral phase; functional pharyngeal phase; esophageal dysphagia; other (see comments)  anxiety adversely affecting participation/performance  -AD -- -- -- functional oral phase; functional pharyngeal phase; other (see comments)  on thin liquids only; pt refuses solids/puree at this time  -AD    Functional Impact risk of malnutrition; risk of dehydration  -AD -- -- -- risk of malnutrition; risk of dehydration  -AD    Swallow Criteria for Skilled Therapeutic Interventions Met no problems identified which require skilled intervention; baseline status; other (see comments)  decreased cooperation  -AD -- -- -- --       Recommendations    Therapy Frequency (Swallow) evaluation only  -AD -- -- -- --    SLP Diet Recommendation soft textures; whole; thin liquids; other (see comments)  GI soft/bland  -AD -- -- -- soft textures; whole; thin liquids; other (see comments)  GI soft/bland  -AD    Recommended Diagnostics -- -- -- -- reassess via clinical swallow evaluation  -AD    Recommended Precautions and Strategies upright posture during/after eating; small  bites of food and sips of liquid; general aspiration precautions; reflux precautions  -AD -- -- -- upright posture during/after eating; small bites of food and sips of liquid; general aspiration precautions; reflux precautions; fatigue precautions  -AD    Oral Care Recommendations Oral Care before breakfast, after meals and PRN; Toothbrush  -AD -- -- -- Oral Care before breakfast, after meals and PRN; Toothbrush  -AD    SLP Rec. for Method of Medication Administration meds whole; with pudding or applesauce; as tolerated  -AD -- -- -- meds whole; with pudding or applesauce; as tolerated  -AD    Monitor for Signs of Aspiration no; notify SLP if any concerns  -AD -- -- -- no; notify SLP if any concerns  -AD    Anticipated Discharge Disposition (SLP) extended care facility  -AD -- -- -- extended care facility  -AD    Patient/Family Concerns, Anticipated Discharge Disposition (SLP) Pt is unable to state any concerns at this time due to confusion and decreased participation.  -AD -- -- -- Pt reports no concerns at this time.  -AD          User Key  (r) = Recorded By, (t) = Taken By, (c) = Cosigned By    Initials Name Effective Dates    AD Dahiana Villa MS CCC-SLP 06/16/21 -                 EDUCATION  The patient has been educated in the following areas:   Dysphagia (Swallowing Impairment). Pt is not receptive to teaching at this time. Refusal of care/evaluation at this time.          Time Calculation:    Time Calculation- SLP     Row Name 01/05/22 1757             Time Calculation- SLP    SLP Start Time 0830  -AD      SLP Stop Time 0851  -AD      SLP Time Calculation (min) 21 min  -AD      Total Timed Code Minutes- SLP 0 minute(s)  -AD      SLP Non-Billable Time (min) 0 min  -AD      SLP Received On 01/05/22  -AD              Untimed Charges    90809-OY Treatment Swallow Minutes 21  -AD              Total Minutes    Untimed Charges Total Minutes 21  -AD       Total Minutes 21  -AD            User Key  (r) = Recorded  By, (t) = Taken By, (c) = Cosigned By    Initials Name Provider Type    AD Dahiana Villa, MS CCC-SLP Speech and Language Pathologist                Therapy Charges for Today     Code Description Service Date Service Provider Modifiers Qty    [id number] HC ST TREATMENT SWALLOW 1 1/5/2022 Dahiana Villa MS SCOTT-SLP GN 1               SLP Discharge Summary  Anticipated Discharge Disposition (SLP): extended care facility    Dahinaa Villa MS CCC-SLP  1/5/2022

## 2022-01-05 NOTE — NURSING NOTE
0640: Lab called with critical blood glucose of 44.   Attempted to give orange juice, pt drank approx 200ml.   25mL D5W given, will recheck blood sugar.

## 2022-01-06 NOTE — PLAN OF CARE
Goal Outcome Evaluation:  Plan of Care Reviewed With: patient        Progress: improving  Outcome Summary: Pt reports improved nausea. Pt up in chair for several hours this shift. Pt ate fair at lunch. Pt A&Ox3. Able to get pt to take PO medications with several verbal cues and encouragement.

## 2022-01-06 NOTE — CASE MANAGEMENT/SOCIAL WORK
Continued Stay Note  VICKY Sharma     Patient Name: Joya Singh  MRN: [id number]  Today's Date: 1/6/2022    Admit Date: 1/3/2022     Discharge Plan     Row Name 01/06/22 1324       Plan    Plan plan return to [facility]- medicaid bed hold    Plan Comments Follow up visit, patient is sitting up in recliner RT has been at bedside and RN currently at bedside. CM will follow for any dc needs.               Discharge Codes    No documentation.                     Alan Gruber RN

## 2022-01-06 NOTE — PROGRESS NOTES
"Hospitalist Team      Patient Care Team:  Marcy Goncalves APRN as PCP - General (Family Medicine)  Christos Rob MD as Surgeon (General Surgery)  German Wylie MD as Surgeon (Orthopedic Surgery)  Yasmani Baugh MD as Consulting Physician (Nephrology)  Yasmani Baugh MD as Referring Physician (Nephrology)  Elieser Headley MD as Consulting Physician (Hematology and Oncology)  Rebecca Will RN as Ambulatory  ([facility])  Ken, Emmanuel Gan MD as Consulting Physician (Gastroenterology)      Chief Complaint: Follow-up suspected aspiration pneumonia    Subjective    Events noted overnight.  Ms. Singh reports she is breathing okay but she is just \"sick\".  She does not have much of an appetite.  She is very nauseated.  No reported chest pain.    Objective    Vital Signs  Temp:  [96 °F (35.6 °C)-98.9 °F (37.2 °C)] 97.4 °F (36.3 °C)  Heart Rate:  [69-89] 84  Resp:  [18-20] 18  BP: (128-154)/(62-81) 128/81  Oxygen Therapy  SpO2: 92 %  Pulse Oximetry Type: Intermittent  Device (Oxygen Therapy): nasal cannula  Device (Oxygen Therapy): nasal cannula  Flow (L/min): 1}  Flowsheet Rows      First Filed Value   Admission Height 154 cm (60.63\") Documented at 01/03/2022 0734   Admission Weight 76.2 kg (168 lb) Documented at 01/03/2022 0357          Physical Exam:    General: Appears stated age in no acute distress.  Lungs: Breath sounds are diminished throughout all fields.  Her respirations are non-labored.  CV: Regular rate and rhythm.  No murmurs noted. Radial pulses are 2+ and symmetric.  No JVD.  Abdomen: Obese, soft, and non-tender.  Bowel sounds are diminished.  MSK: No C/C/E.  Skin: Large hematoma right elbow.  Neuro: CN II-XII grossly intact.  Psych: Normal affect.  AAOx2.    Results Review:     I reviewed the patient's new clinical results.    Lab Results (last 24 hours)     Procedure Component Value Units Date/Time    POC Glucose Once [[id number]]  (Abnormal) " Collected: 01/06/22 0711    Specimen: Blood Updated: 01/06/22 0748     Glucose 167 mg/dL      Comment: Meter: [id number] : [id number] Ynes Rosenthal NURSING ASSISTANT       Procalcitonin [[id number]]  (Abnormal) Collected: 01/06/22 0705    Specimen: Blood Updated: 01/06/22 0737     Procalcitonin 0.52 ng/mL     Basic Metabolic Panel [[id number]]  (Abnormal) Collected: 01/06/22 0705    Specimen: Blood Updated: 01/06/22 0736     Glucose 171 mg/dL      BUN 13 mg/dL      Creatinine 1.35 mg/dL      Sodium 133 mmol/L      Potassium 4.1 mmol/L      Chloride 104 mmol/L      CO2 24.2 mmol/L      Calcium 7.8 mg/dL      eGFR Non African Amer 38 mL/min/1.73      BUN/Creatinine Ratio 9.6     Anion Gap 4.8 mmol/L     Narrative:      GFR Normal >60  Chronic Kidney Disease <60  Kidney Failure <15      CBC (No Diff) [[id number]]  (Abnormal) Collected: 01/06/22 0705    Specimen: Blood Updated: 01/06/22 0724     WBC 1.68 10*3/mm3      RBC 3.31 10*6/mm3      Hemoglobin 9.1 g/dL      Hematocrit 29.8 %      MCV 90.0 fL      MCH 27.5 pg      MCHC 30.5 g/dL      RDW 18.0 %      RDW-SD 58.1 fl      MPV --     Comment: Unable to calculate        Platelets 100 10*3/mm3     Blood Culture - Blood, Arm, Left [[id number]]  (Normal) Collected: 01/03/22 0610    Specimen: Blood from Arm, Left Updated: 01/06/22 0700     Blood Culture No growth at 3 days    POC Glucose Once [[id number]]  (Abnormal) Collected: 01/05/22 1933    Specimen: Blood Updated: 01/05/22 1939     Glucose 162 mg/dL      Comment: Meter: [id number] : [id number] Obed Bermudez CNA       POC Glucose Once [[id number]]  (Abnormal) Collected: 01/05/22 1639    Specimen: Blood Updated: 01/05/22 1646     Glucose 200 mg/dL      Comment: Meter: [id number] : [id number] Dayday eL CNA       Procalcitonin [[id number]]  (Abnormal) Collected: 01/05/22 0549    Specimen: Blood Updated: 01/05/22 1232     Procalcitonin 0.61 ng/mL     POC Glucose Once [[id number]]  (Normal) Collected:  01/05/22 1146    Specimen: Blood Updated: 01/05/22 1157     Glucose 106 mg/dL      Comment: Meter: [id number] : [id number] Dayday Le CNA       Blood Culture - Blood, Arm, Left [[id number]]  (Abnormal) Collected: 01/03/22 0610    Specimen: Blood from Arm, Left Updated: 01/05/22 1039     Blood Culture Staphylococcus, coagulase negative     Isolated from Aerobic Bottle     Gram Stain Aerobic Bottle Gram positive cocci in clusters    Narrative:      Probable contaminant requires clinical correlation, susceptibility not performed unless requested by physician.      Urine Culture - Urine, Urine, Clean Catch [[id number]]  (Abnormal) Collected: 01/03/22 0505    Specimen: Urine, Clean Catch Updated: 01/05/22 0922     Urine Culture >100,000 CFU/mL Gram Negative Bacilli      <25,000 CFU/mL Mixed Kedar Isolated    Narrative:      Specimen contains mixed organisms of questionable pathogenicity which indicates contamination with commensal kedar.  Further identification is unlikely to provide clinically useful information.  Suggest recollection.          Imaging Results (Last 24 Hours)     ** No results found for the last 24 hours. **          Medication Review:   I have reviewed the patient's current medication list    Current Facility-Administered Medications:   •  acetaminophen (TYLENOL) tablet 650 mg, 650 mg, Oral, Q4H PRN **OR** acetaminophen (TYLENOL) 160 MG/5ML solution 650 mg, 650 mg, Oral, Q4H PRN **OR** acetaminophen (TYLENOL) suppository 650 mg, 650 mg, Rectal, Q4H PRN, Vladimir Mcintosh, DO  •  amiodarone (PACERONE) tablet 200 mg, 200 mg, Oral, Daily, Maury Denton, DO, 200 mg at 01/06/22 0822  •  apixaban (ELIQUIS) tablet 5 mg, 5 mg, Oral, BID, Maury Denton DO, 5 mg at 01/06/22 0822  •  atorvastatin (LIPITOR) tablet 10 mg, 10 mg, Oral, Nightly, Maury Denton, DO, 10 mg at 01/05/22 2059  •  benzonatate (TESSALON) capsule 100 mg, 100 mg, Oral, Q8H PRN, Maury Denton, DO  •  bisacodyl (DULCOLAX) EC tablet 5  mg, 5 mg, Oral, Daily PRN, Maury Denton, DO  •  bisacodyl (DULCOLAX) suppository 10 mg, 10 mg, Rectal, Daily PRN, Maury Denton, DO  •  cefTRIAXone (ROCEPHIN) IVPB 2 g/50ml dextrose (premix), 2 g, Intravenous, Q24H, Maury Denton, DO, Last Rate: 100 mL/hr at 01/05/22 1336, 2 g at 01/05/22 1336  •  desvenlafaxine (PRISTIQ) 24 hr tablet 50 mg, 50 mg, Oral, Daily, Maury Denton, DO, 50 mg at 01/06/22 0822  •  dextrose (D50W) (25 g/50 mL) IV injection 25 g, 25 g, Intravenous, Q15 Min PRN, Vladimir Mcintosh, DO, 25 g at 01/05/22 0640  •  dextrose (GLUTOSE) oral gel 15 g, 15 g, Oral, Q15 Min PRN, McintoshVladimir, DO  •  gabapentin (NEURONTIN) capsule 100 mg, 100 mg, Oral, TID, Maury Denton, DO, 100 mg at 01/06/22 0822  •  glucagon (GLUCAGEN) injection 1 mg, 1 mg, Subcutaneous, Q15 Min PRN, Vladimir Mcintosh, DO  •  guaiFENesin-dextromethorphan (ROBITUSSIN DM) 100-10 MG/5ML syrup 5 mL, 5 mL, Oral, Q4H PRN, Vladimir Mcintosh, DO, 5 mL at 01/04/22 1324  •  HYDROcodone-acetaminophen (NORCO) 5-325 MG per tablet 1 tablet, 1 tablet, Oral, BID PRN, Maury Denton DO, 1 tablet at 01/04/22 2014  •  insulin aspart (novoLOG) injection 0-7 Units, 0-7 Units, Subcutaneous, TID AC, Vladimir Mcintosh, DO, 2 Units at 01/06/22 0822  •  ipratropium-albuterol (DUO-NEB) nebulizer solution 3 mL, 3 mL, Nebulization, Q4H PRN, Vladimir Mcintosh, DO  •  ipratropium-albuterol (DUO-NEB) nebulizer solution 3 mL, 3 mL, Nebulization, 4x Daily - RT, Vladimir Mcintosh, DO, 3 mL at 01/05/22 1949  •  levothyroxine (SYNTHROID, LEVOTHROID) tablet 88 mcg, 88 mcg, Oral, Daily, Maury Denton, , 88 mcg at 01/06/22 0822  •  melatonin tablet 5 mg, 5 mg, Oral, Nightly PRN, Maury Denton,   •  metroNIDAZOLE (FLAGYL) tablet 500 mg, 500 mg, Oral, Q8H, Maury Denton, , 500 mg at 01/06/22 0614  •  midodrine (PROAMATINE) tablet 2.5 mg, 2.5 mg, Oral, TID AC, Maury Denton, , 2.5 mg at 01/06/22 0822  •  ondansetron (ZOFRAN) tablet 4 mg, 4 mg, Oral, Q6H PRN, 4 mg at  01/04/22 1704 **OR** ondansetron (ZOFRAN) injection 4 mg, 4 mg, Intravenous, Q6H PRN, Mcintosh, Birrilla, DO, 4 mg at 01/06/22 0828  •  pantoprazole (PROTONIX) EC tablet 40 mg, 40 mg, Oral, QAM, Maury Denton, DO, 40 mg at 01/06/22 0614  •  polyethylene glycol (MIRALAX) packet 17 g, 17 g, Oral, BID PRN, Maury Denton, DO  •  potassium chloride (K-DUR,KLOR-CON) CR tablet 40 mEq, 40 mEq, Oral, PRN **OR** potassium chloride (KLOR-CON) packet 40 mEq, 40 mEq, Oral, PRN **OR** potassium chloride 10 mEq in 100 mL IVPB, 10 mEq, Intravenous, Q1H PRN, Mcintosh, Birrilla, DO, Stopped at 01/04/22 1325  •  promethazine (PHENERGAN) tablet 6.25 mg, 6.25 mg, Oral, Q6H PRN, Maury Denton, DO, 6.25 mg at 01/06/22 0455  •  sennosides-docusate (PERICOLACE) 8.6-50 MG per tablet 2 tablet, 2 tablet, Oral, Nightly, Maury Denton, DO, 2 tablet at 01/05/22 2059  •  Insert peripheral IV, , , Once **AND** sodium chloride 0.9 % flush 10 mL, 10 mL, Intravenous, PRN, Mcintosh, Birrilla, DO  •  sodium chloride 0.9 % flush 10 mL, 10 mL, Intravenous, PRN, Mcintosh, Birrilla, DO  •  sodium chloride 0.9 % flush 10 mL, 10 mL, Intravenous, Q12H, Mcintosh, Birrilla, DO, 10 mL at 01/05/22 2059  •  sodium chloride 0.9 % infusion 40 mL, 40 mL, Intravenous, PRN, Mcintosh, Birrilla, DO, 40 mL at 01/04/22 1216  •  tamsulosin (FLOMAX) 24 hr capsule 0.4 mg, 0.4 mg, Oral, Nightly, Maury Denton, DO, 0.4 mg at 01/05/22 2059    Facility-Administered Medications Ordered in Other Encounters:   •  acetaminophen (TYLENOL) tablet 650 mg, 650 mg, Oral, Q6H PRN, Elieser Headley MD  •  diphenhydrAMINE (BENADRYL) capsule 25 mg, 25 mg, Oral, Once, Elieser Headley MD      Assessment/Plan     1.  Suspected Aspiration Pneumonia: SLP notes reviewed.  I've changed her to IV Flagyl to help w/ her nausea although I do agree, this is chronic.  Procal trending down so there could be a small bacterial component.    2.  Hypoxia: Stable on 1L.    3.  Pancytopenia: Leukopenia noted previously  this year, but lower than prior.  Check diff.  Would also like to check a viral panel.    4.  Acute GNB UTI: Continue Rocephin.  Await speciation and sensitivities.    5.  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  Hgb stable s/p 2 units PRBC's.  Follows w/ [facility].  As in #3.    6.  Hypothyroidism: Continue current replacement dose.    7.  Chronic dCHF: No acute issues.    8.  Hypertension: BP at goal.  Continue to monitor trend on current regimen.    9.  Diabetes Mellitus, Type 2 in Obese w/ Gastroparesis: Bedsides good.  No further hypoglycemia.  Do not believe blood glucose affecting gastroparesis.    10.  Depression: No acute issues.    11.  CKD IV Creatinine at baseline.    Plan for disposition: Back to facility when able.    Elton Guzman MD  01/06/22  08:45 EST

## 2022-01-06 NOTE — PLAN OF CARE
Goal Outcome Evaluation:  Plan of Care Reviewed With: patient        Progress: no change    Pt still c/o abd pain, Zofran and Phenergan given.  Reluctantly takes medication with applesause, but when left unattended, pt ate meals with no swallowing difficulty.

## 2022-01-06 NOTE — PLAN OF CARE
Problem: Adult Inpatient Plan of Care  Goal: Plan of Care Review  Outcome: Ongoing, Progressing  Flowsheets (Taken 1/5/2022 1954)  Progress: improving  Plan of Care Reviewed With: patient     Problem: Pain Chronic (Persistent) (Comorbidity Management)  Goal: Acceptable Pain Control and Functional Ability  Intervention: Optimize Psychosocial Wellbeing  Recent Flowsheet Documentation  Taken 1/5/2022 1949 by Jose Villatoro, RRT  Supportive Measures: active listening utilized   Goal Outcome Evaluation:  Plan of Care Reviewed With: patient        Progress: improving

## 2022-01-07 NOTE — PROGRESS NOTES
"Hospitalist Team      Patient Care Team:  Marcy Goncalves APRN as PCP - General (Family Medicine)  Christos Rob MD as Surgeon (General Surgery)  German Wylie MD as Surgeon (Orthopedic Surgery)  Yasmani Baugh MD as Consulting Physician (Nephrology)  Yasmani Baugh MD as Referring Physician (Nephrology)  Elieser Headley MD as Consulting Physician (Hematology and Oncology)  KenEmmanuel dutton MD as Consulting Physician (Gastroenterology)      Chief Complaint: Follow-up Leukopenia; E coli UTI    Subjective    Feels a little better this morning.  Nausea is chronic, but a little better controlled.  She denies chest pain and dyspnea.  Hasn't had breakfast yet this morning.    Objective    Vital Signs  Temp:  [97.5 °F (36.4 °C)-98.4 °F (36.9 °C)] 97.9 °F (36.6 °C)  Heart Rate:  [71-90] 76  Resp:  [18-28] 20  BP: (135-147)/(60-74) 147/74  Oxygen Therapy  SpO2: 92 %  Pulse Oximetry Type: Intermittent  Device (Oxygen Therapy): nasal cannula  Device (Oxygen Therapy): nasal cannula  Flow (L/min): 1}    Flowsheet Rows      First Filed Value   Admission Height 154 cm (60.63\") Documented at 01/03/2022 0734   Admission Weight 76.2 kg (168 lb) Documented at 01/03/2022 0357          Physical Exam:    General: Appears stated age in no acute distress.  Lungs: Breath sounds are coarse at the right base.  Otherwise, clear.  No rhonchi.  Breathing is non-labored.  CV: Irregular rhythm.  No murmur appreciated.  Radial pulses are 2+ and symmetric.  Abdomen: Obese, soft, and non-tender.  Bowel sounds are active.  MSK: No C/C/E.  Neuro: CN II-XII grossly intact.  Psych: Pleasant affect.    Results Review:     I reviewed the patient's new clinical results.    Lab Results (last 24 hours)     Procedure Component Value Units Date/Time    Basic Metabolic Panel [[id number]]  (Abnormal) Collected: 01/07/22 0800    Specimen: Blood Updated: 01/07/22 0823     Glucose 148 mg/dL      BUN 10 mg/dL      Creatinine " 1.27 mg/dL      Sodium 138 mmol/L      Potassium 4.1 mmol/L      Chloride 107 mmol/L      CO2 22.3 mmol/L      Calcium 7.9 mg/dL      eGFR Non African Amer 41 mL/min/1.73      BUN/Creatinine Ratio 7.9     Anion Gap 8.7 mmol/L     Narrative:      GFR Normal >60  Chronic Kidney Disease <60  Kidney Failure <15      CBC & Differential [[id number]]  (Abnormal) Collected: 01/07/22 0800    Specimen: Blood Updated: 01/07/22 0813    Narrative:      The following orders were created for panel order CBC & Differential.  Procedure                               Abnormality         Status                     ---------                               -----------         ------                     CBC Auto Differential[[id number]]        Abnormal            Final result               Scan Slide[[id number]]                                                                    Please view results for these tests on the individual orders.    CBC Auto Differential [[id number]]  (Abnormal) Collected: 01/07/22 0800    Specimen: Blood Updated: 01/07/22 0813     WBC 1.58 10*3/mm3      RBC 3.39 10*6/mm3      Hemoglobin 9.3 g/dL      Hematocrit 30.4 %      MCV 89.7 fL      MCH 27.4 pg      MCHC 30.6 g/dL      RDW 17.7 %      RDW-SD 57.8 fl      MPV --     Comment: Unable to calculate        Platelets 105 10*3/mm3      Neutrophil % 32.8 %      Lymphocyte % 50.0 %      Monocyte % 5.1 %      Eosinophil % 7.6 %      Basophil % 3.2 %      Immature Grans % 1.3 %      Neutrophils, Absolute 0.52 10*3/mm3      Lymphocytes, Absolute 0.79 10*3/mm3      Monocytes, Absolute 0.08 10*3/mm3      Eosinophils, Absolute 0.12 10*3/mm3      Basophils, Absolute 0.05 10*3/mm3      Immature Grans, Absolute 0.02 10*3/mm3     POC Glucose Once [[id number]]  (Abnormal) Collected: 01/07/22 0715    Specimen: Blood Updated: 01/07/22 0736     Glucose 138 mg/dL      Comment: Meter: [id number] : [id number] Ynes Rosenthal NURSING ASSISTANT       Blood Culture - Blood, Arm, Left  [[id number]]  (Normal) Collected: 01/03/22 0610    Specimen: Blood from Arm, Left Updated: 01/07/22 0700     Blood Culture No growth at 4 days    POC Glucose Once [[id number]]  (Abnormal) Collected: 01/06/22 2024    Specimen: Blood Updated: 01/06/22 2030     Glucose 182 mg/dL      Comment: Meter: [id number] : [id number] Obed Rodrigueztaracelis THOMASA       POC Glucose Once [[id number]]  (Abnormal) Collected: 01/06/22 1637    Specimen: Blood Updated: 01/06/22 1654     Glucose 180 mg/dL      Comment: Meter: [id number] : [id number] Ynes Rosenthal NURSING ASSISTANT       POC Glucose Once [[id number]]  (Normal) Collected: 01/06/22 1310    Specimen: Blood Updated: 01/06/22 1316     Glucose 113 mg/dL      Comment: Meter: [id number] : [id number] Bonnie Ho RN       Urine Culture - Urine, Urine, Clean Catch [[id number]]  (Abnormal)  (Susceptibility) Collected: 01/03/22 0505    Specimen: Urine, Clean Catch Updated: 01/06/22 1311     Urine Culture >100,000 CFU/mL Escherichia coli      <25,000 CFU/mL Mixed Kedar Isolated    Narrative:      Specimen contains mixed organisms of questionable pathogenicity which indicates contamination with commensal kedar.  Further identification is unlikely to provide clinically useful information.  Suggest recollection.    Susceptibility      Escherichia coli     NEEMA     Ampicillin Susceptible     Ampicillin + Sulbactam Susceptible     Cefazolin Susceptible     Cefepime Susceptible     Ceftazidime Susceptible     Ceftriaxone Susceptible     Gentamicin Susceptible     Levofloxacin Susceptible     Nitrofurantoin Susceptible     Piperacillin + Tazobactam Susceptible     Trimethoprim + Sulfamethoxazole Susceptible                  Linear View                   Respiratory Panel PCR w/COVID-19(SARS-CoV-2) DYLAN/MORRIS/SATYA/PAD/[facility]/MAD/MICKIE In-House, NP Swab in UTM/VTM, 3-4 HR TAT - Swab, Nasopharynx [[id number]] Collected: 01/06/22 1302    Specimen: Swab from Nasopharynx Updated: 01/06/22 1305    POC Glucose  Once [[id number]]  (Abnormal) Collected: 01/06/22 1205    Specimen: Blood Updated: 01/06/22 1218     Glucose 67 mg/dL      Comment: Meter: [id number] : [id number] Arturonaomy Ariadna NURSING ASSISTANT             Imaging Results (Last 24 Hours)     ** No results found for the last 24 hours. **            Medication Review:   I have reviewed the patient's current medication list    Current Facility-Administered Medications:   •  acetaminophen (TYLENOL) tablet 650 mg, 650 mg, Oral, Q4H PRN **OR** acetaminophen (TYLENOL) 160 MG/5ML solution 650 mg, 650 mg, Oral, Q4H PRN **OR** acetaminophen (TYLENOL) suppository 650 mg, 650 mg, Rectal, Q4H PRN, Vladimir Mcintosh, DO  •  amiodarone (PACERONE) tablet 200 mg, 200 mg, Oral, Daily, Maury Denton, DO, 200 mg at 01/07/22 0815  •  apixaban (ELIQUIS) tablet 5 mg, 5 mg, Oral, BID, Maury Denton, DO, 5 mg at 01/07/22 0815  •  atorvastatin (LIPITOR) tablet 10 mg, 10 mg, Oral, Nightly, Maury Denton, DO, 10 mg at 01/06/22 2026  •  benzonatate (TESSALON) capsule 100 mg, 100 mg, Oral, Q8H PRN, Maury Denton, DO  •  bisacodyl (DULCOLAX) EC tablet 5 mg, 5 mg, Oral, Daily PRN, Maury Denton, DO  •  bisacodyl (DULCOLAX) suppository 10 mg, 10 mg, Rectal, Daily PRN, Maury Denton, DO  •  cefTRIAXone (ROCEPHIN) IVPB 1 g/50ml dextrose (premix), 1 g, Intravenous, Q24H, Elton Guzman MD, Last Rate: 100 mL/hr at 01/06/22 1253, 1 g at 01/06/22 1253  •  desvenlafaxine (PRISTIQ) 24 hr tablet 50 mg, 50 mg, Oral, Daily, Maury Denton, DO, 50 mg at 01/07/22 0815  •  dextrose (D50W) (25 g/50 mL) IV injection 25 g, 25 g, Intravenous, Q15 Min PRN, McintoshJose Danielrilla, DO, 25 g at 01/05/22 0640  •  dextrose (GLUTOSE) oral gel 15 g, 15 g, Oral, Q15 Min PRN, Mcintosh, Jose Danielrialexisa, DO, 15 g at 01/06/22 1226  •  gabapentin (NEURONTIN) capsule 100 mg, 100 mg, Oral, TID, Maury Denton DO, 100 mg at 01/07/22 0815  •  glucagon (GLUCAGEN) injection 1 mg, 1 mg, Subcutaneous, Q15 Min PRN, Mcintosh, Birrilla, DO  •   guaiFENesin-dextromethorphan (ROBITUSSIN DM) 100-10 MG/5ML syrup 5 mL, 5 mL, Oral, Q4H PRN, Mcintosh, Birrilla, DO, 5 mL at 01/04/22 1324  •  HYDROcodone-acetaminophen (NORCO) 5-325 MG per tablet 1 tablet, 1 tablet, Oral, BID PRN, Maury Denton, DO, 1 tablet at 01/04/22 2014  •  insulin aspart (novoLOG) injection 0-7 Units, 0-7 Units, Subcutaneous, TID AC, Mcintosh, Birrilla, DO, 2 Units at 01/06/22 1729  •  ipratropium-albuterol (DUO-NEB) nebulizer solution 3 mL, 3 mL, Nebulization, Q4H PRN, Mcintosh Birrilla, DO  •  ipratropium-albuterol (DUO-NEB) nebulizer solution 3 mL, 3 mL, Nebulization, 4x Daily - RT, McintoshJose Danielrialexisa, DO, 3 mL at 01/07/22 0716  •  levothyroxine (SYNTHROID, LEVOTHROID) tablet 88 mcg, 88 mcg, Oral, Daily, Maury Denton, DO, 88 mcg at 01/07/22 0815  •  melatonin tablet 5 mg, 5 mg, Oral, Nightly PRN, Maury Denton, DO  •  metroNIDAZOLE (FLAGYL) 500 mg/100mL IVPB, 500 mg, Intravenous, Q8H, Elton Guzman MD, Last Rate: 0 mL/hr at 01/06/22 2244, 500 mg at 01/07/22 0632  •  midodrine (PROAMATINE) tablet 2.5 mg, 2.5 mg, Oral, TID AC, Maury Denton, DO, 2.5 mg at 01/07/22 0815  •  ondansetron (ZOFRAN) tablet 4 mg, 4 mg, Oral, Q6H PRN, 4 mg at 01/04/22 1704 **OR** ondansetron (ZOFRAN) injection 4 mg, 4 mg, Intravenous, Q6H PRN, McintoshJose Danielrialexisa, DO, 4 mg at 01/06/22 2025  •  pantoprazole (PROTONIX) EC tablet 40 mg, 40 mg, Oral, QAM, Maury Denton, DO, 40 mg at 01/07/22 0631  •  polyethylene glycol (MIRALAX) packet 17 g, 17 g, Oral, BID PRN, Maury Denton, DO  •  potassium chloride (K-DUR,KLOR-CON) CR tablet 40 mEq, 40 mEq, Oral, PRN **OR** potassium chloride (KLOR-CON) packet 40 mEq, 40 mEq, Oral, PRN **OR** potassium chloride 10 mEq in 100 mL IVPB, 10 mEq, Intravenous, Q1H PRN, Vladimir Mcintosh, , Stopped at 01/04/22 1325  •  promethazine (PHENERGAN) tablet 6.25 mg, 6.25 mg, Oral, Q6H PRN, Maury Denton, DO, 6.25 mg at 01/06/22 1729  •  sennosides-docusate (PERICOLACE) 8.6-50 MG per tablet 2 tablet, 2  tablet, Oral, Nightly, Maury Denton, DO, 2 tablet at 01/06/22 2026  •  Insert peripheral IV, , , Once **AND** sodium chloride 0.9 % flush 10 mL, 10 mL, Intravenous, PRN, Mcintosh, Birrilla, DO  •  sodium chloride 0.9 % flush 10 mL, 10 mL, Intravenous, PRN, Mcintosh, Birrilla, DO  •  sodium chloride 0.9 % flush 10 mL, 10 mL, Intravenous, Q12H, Mcintosh, Birrilla, DO, 10 mL at 01/07/22 0816  •  sodium chloride 0.9 % infusion 40 mL, 40 mL, Intravenous, PRN, Mcintosh, Birrilla, DO, 40 mL at 01/04/22 1216  •  tamsulosin (FLOMAX) 24 hr capsule 0.4 mg, 0.4 mg, Oral, Nightly, Maury Denton, DO, 0.4 mg at 01/06/22 2026    Facility-Administered Medications Ordered in Other Encounters:   •  acetaminophen (TYLENOL) tablet 650 mg, 650 mg, Oral, Q6H PRN, Elieser Headley MD  •  diphenhydrAMINE (BENADRYL) capsule 25 mg, 25 mg, Oral, Once, Elieser Headley MD      Assessment/Plan     1. Leukopenia: Trend is down as is ANC trend.  Hgb and platelets are stable.  Will discuss w/ Heme/Onc w/ regard to a CSF however, her viral panel remains pending.  Procalcitonin continues to trend down.    2.  E. Coli UTI: Will transition to oral therapy to complete a 7-day course.    3.  Chronic Aspiration: Will stop Flagyl therapy after today's last dose.  Cefdinir as above.     4.  Hypoxia:  Remains stable on 1L.  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     5.  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     6.  Hypothyroidism: Continue current replacement dose.     7.  Chronic dCHF:  No acute issues.     8.  Hypertension: Pressure near goal on exam.  Trend up a little.  Will continue to monitor on current regimen and adjust as necessary.     9.  Diabetes Mellitus, Type 2 in Obese w/ Gastroparesis: Bedsides and AM at goal.  No change to current regimen.     10.  Depression: Nothing acute.     11.  CKD IV: Creatinine a little better than baseline.    Plan for disposition: Back to facility when  jigna.    Elton Guzman MD  01/07/22  09:21 EST

## 2022-01-07 NOTE — PLAN OF CARE
Goal Outcome Evaluation:  Plan of Care Reviewed With: patient        Progress: no change  Outcome Summary: VSS, pt has repeated requests, have to cue and encourage, c/o nausea prn administered with improvement

## 2022-01-07 NOTE — PLAN OF CARE
Goal Outcome Evaluation:  Plan of Care Reviewed With: patient        Progress: improving  Outcome Summary: Pt VSS this shift. Pt reports no pain or nausea this shift. PO intake encouraged. Pt up in chair for several hours this shift. Pt resting at this time.

## 2022-01-07 NOTE — PROGRESS NOTES
Adult Nutrition  Assessment/PES    Patient Name:  Joya Singh  YOB: 1940  MRN: [id number]  Admit Date:  1/3/2022    Assessment Date:  1/7/2022    Comments:  Improved intake 100% of breakfast meal today. Will cont to follow and monitor.   Encouraged po intake,     This note completed with aid of nursing and review  of medical record,.     Reason for Assessment     Row Name 01/07/22 1338          Reason for Assessment    Reason For Assessment follow-up protocol     Diagnosis pulmonary disease; infection/sepsis  luekopenia, UTI, Asp , Acute on chronic anmeia, C CHF, HTN hx DM MDS                Nutrition/Diet History     Row Name 01/07/22 1340          Nutrition/Diet History    Typical Food/Fluid Intake call to room no answer. noted ate 100% this am                  Labs/Tests/Procedures/Meds     Row Name 01/07/22 1341          Labs/Procedures/Meds    Lab Results Reviewed reviewed     Lab Results Comments glu 182 H, Creat 1.2 H            Diagnostic Tests/Procedures    Diagnostic Test/Procedure Reviewed reviewed            Medications    Pertinent Medications Reviewed reviewed     Pertinent Medications Comments novolog                    Nutrition Prescription Ordered     Row Name 01/07/22 1342          Nutrition Prescription PO    Current PO Diet Soft Texture     Common Modifiers GI Soft/Camilla                Evaluation of Received Nutrient/Fluid Intake     Row Name 01/07/22 1343          Fluid Intake Evaluation    Oral Fluid (mL) 360  24%            PO Evaluation    Number of Meals 3     % PO Intake 50                     Problem/Interventions:   Problem 1     Row Name 01/07/22 1344          Nutrition Diagnoses Problem 1    Problem 1 Biting/Chewing Difficulty     Etiology (related to) Factors Affecting Nutrition     Signs/Symptoms (evidenced by) Report/Observation                      Intervention Goal     Row Name 01/07/22 1344          Intervention Goal    General Meet nutritional needs for  age/condition     PO PO intake (%)     PO Intake % 60 %  or greater     Weight No significant weight loss                Nutrition Intervention     Row Name 01/07/22 1345          Nutrition Intervention    RD/Tech Action Follow Tx progress                  Education/Evaluation     Row Name 01/07/22 1345          Monitor/Evaluation    Monitor Per protocol; I&O; PO intake; Pertinent labs; Weight; Symptoms                 Electronically signed by:  Cristiane Bush RD  01/07/22 13:46 EST

## 2022-01-07 NOTE — CONSULTS
Subjective     REASON FOR CONSULTATION:  MDS WITH CHRONIC PANCYTOPENIA, NEUTROPENIA, UTI WITH E COLI, CKD, IMMUNOSUPPRESSED  Provide an opinion on any further workup or treatment                             REQUESTING PHYSICIAN:    Vladimir Mcintosh DO      Attending     Since 1/3/2022     [phone]     Chat          RECORDS OBTAINED:  Records of the patients history including those obtained from the referring provider were reviewed and summarized in detail.        History of Present Illness       I have been asked to see this patient in consultation, a 81-year-old white female who has been seen by my partner, Dr. Trupti Bowles in the past in regard to myelodysplasia with pancytopenia who has been admitted to the hospital for several days with significant respiratory symptomatology consisting of persistent cough associated with clear sputum production, shortness of breath and lack of appetite.  She has been found also to have a urinary tract infection.  She is known for having chronic kidney disease.  The patient has been found to have respiratory syncytial virus as well.  The reason for the consultation is because of patient’s significant leukopenia and neutropenia and obviously the primary team wants to know how to proceed in regards to the management of this on the background of bacterial infection and viral infection in the background of chronic kidney disease in a patient who is chronically immunosuppressed.  Besides the above symptoms, the patient has not too much of an appetite.  Her bowel activity is mediocre.  Her urinary output seems to be appropriate.  She has no pleuritic pain.  She denies any fever and she overall feels better today than how she was feeling at the time of the admission several days ago.  Otherwise, the patient denies any rashes, any joint pain and any other neurological symptomatology.        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  In addition, the patient has chronic kidney disease, stage III.       Patient was initiated on Procrit therapy with continued red cell transfusion requirements despite maximum dose Procrit.  She attempted to take Revlimid on 2 occasions but developed rash despite dose reductions of Revlimid.     Patient initiated Procrit combined with Neupogen weekly on 11/11/2020.  She has had a very good partial response with decrease transfusion requirements and improved ANC.  We will continue weekly CBC and Procrit/Neupogen.       She requires transfusion for hemoglobin 8.0 or less and receives Lasix between units of blood.       Hemoglobin is 9.4 today and the patient will be given Procrit/Neupogen. Continue weekly CBC and Procrit/Neupogen.     2.  Monoclonal gammopathy of undetermined significance: The patient's bone marrow showed slight increase in plasma cells 8% of the cellularity but normal in morphology.  She has an IgA monoclonality on her immunofixation but no measurable M spike and a normal free light chain ratio.   Repeat studies 5/16/2019 showed a stable IgA 509 with a normal light chain ratio, no M spike.  Studies performed 10/24/2019 show stable IgA at 491, no M spike with mildly increased kappa/lambda light chain ratio related to her CKD.  Repeat studies 9/16/2020 stable.  Repeat studies 12-20 showed no measurable M spike, free light chain ratio 2.46, immunofixation IgA lambda (gA level 433)-all stable  I will repeat her SPEP, RANJANA, free light chain ratio today.     3.  Chronic kidney disease, stage III which contributes to anemia.       4.  Transfusional iron overload.  The patient has poor performance status but low-grade MDS and may benefit from iron chelation but complicated by her CKD.  Iron levels/ferritin drawn today-pending     5.  Chronic pain.  Patient's pain in shoulders is chronic in  nature.  Past Medical History:   Diagnosis Date   • Allergic rhinitis    • Anemia    • Anxiety    • Appetite absent    • Arthritis    • Asthma    • Back pain    • Bell's palsy    • Black tarry stools    • Blood in stool    • Chronic fatigue    • CKD (chronic kidney disease) stage 3, GFR 30-59 ml/min ([facility])    • Community acquired pneumonia of left lung 10/25/2018   • Cough    • Depression    • Diabetes mellitus ([facility])     LAST A1C 6   • Diabetic gastroparesis ([facility]) 2/19/2016   • Difficulty walking    • Excessive urination at night    • Frequent urination    • GERD (gastroesophageal reflux disease)    • GI bleed    • Gout    • H/O blood clots     LEFT LEG 7 OR 8 YEARS AGO   • Heat intolerance    • History of fall 10/2018   • History of prior pregnancies     x8, miscarriage 5   • History of transfusion 11/2018    due to anemia   • Hyperlipidemia    • Hypertension    • Hypothyroidism    • Myelodysplastic syndrome with isolated del(5q) chromosomal abnormality ([facility])    • Normal coronary arteries     by cath 2013   • Orthostatic hypotension    • AARON (obstructive sleep apnea)     DOESNT WEAR REGULARLY   • Paraplegic immobility syndrome    • Pneumonia    • PONV (postoperative nausea and vomiting)    • Skin cancer    • Stroke ([facility])     Several mini-strokes   • TIA (transient ischemic attack)     LAST TIA JULY 2017   • Urination pain    • UTI (urinary tract infection)     Dec 2018 and Jan 2019        Past Surgical History:   Procedure Laterality Date   • BACK SURGERY      HARDWARE   • CHOLECYSTECTOMY      OPEN   • COLONOSCOPY  2011    due for repeat in 2021   • COLONOSCOPY N/A 10/28/2018    Procedure: COLONOSCOPY;  Surgeon: Emmanuel Rogers MD;  Location: [facility] OR;  Service: Gastroenterology   • ENDOSCOPY N/A 10/26/2018    Procedure: ESOPHAGOGASTRODUODENOSCOPY;  Surgeon: Emmanuel Rogers MD;  Location: [facility] OR;  Service: Gastroenterology   • ENDOSCOPY N/A 5/13/2021    Procedure:  ESOPHAGOGASTRODUODENOSCOPY, biopsy, dilatation;  Surgeon: Emmanuel Rogers MD;  Location: [facility];  Service: Gastroenterology;  Laterality: N/A;  Esophagitis; Dilation- no stricture; Biopsy- esophagus   • HYSTERECTOMY      PARTIAL    • KNEE SURGERY     • NECK SURGERY     • SPINE SURGERY     • TUMOR REMOVAL Left     Leg   • UPPER GASTROINTESTINAL ENDOSCOPY  2014    gastritis.  done by dr. hastings        Current Facility-Administered Medications on File Prior to Encounter   Medication Dose Route Frequency Provider Last Rate Last Admin   • acetaminophen (TYLENOL) tablet 650 mg  650 mg Oral Q6H PRN Elieser Headley MD       • diphenhydrAMINE (BENADRYL) capsule 25 mg  25 mg Oral Once Elieser Headley MD         Current Outpatient Medications on File Prior to Encounter   Medication Sig Dispense Refill   • acetaminophen (TYLENOL) 325 MG tablet Take 2 tablets by mouth Every 6 (Six) Hours As Needed for Mild Pain . OTC product 40 tablet 0   • amiodarone (PACERONE) 200 MG tablet Take 200 mg by mouth Daily.     • apixaban (Eliquis) 5 MG tablet tablet Take 1 tablet by mouth 2 (Two) Times a Day. 180 tablet 1   • atorvastatin (LIPITOR) 10 MG tablet TAKE ONE TABLET BY MOUTH AT BEDTIME (Patient taking differently: 10 mg.) 90 tablet 1   • bisacodyl (DULCOLAX) 5 MG EC tablet Take 5 mg by mouth Daily As Needed for Constipation.     • gabapentin (NEURONTIN) 100 MG capsule Take 100 mg by mouth 3 (Three) Times a Day.     • insulin detemir (LEVEMIR) 100 UNIT/ML injection Inject 10 Units under the skin into the appropriate area as directed Every Night.     • levothyroxine (SYNTHROID, LEVOTHROID) 88 MCG tablet TAKE ONE TABLET BY MOUTH EVERY DAY (Patient taking differently: Take 88 mcg by mouth Daily.) 90 tablet 1   • promethazine (Phenergan) 25 MG/ML injection Inject 25 mg into the appropriate muscle as directed by prescriber Every 6 (Six) Hours As Needed for Nausea or Vomiting.     • ACCU-CHEK FASTCLIX LANCETS misc TEST 3-4  TIMES DAILY AS DIRECTED 400 each 3   • ACCU-CHEK SMARTVIEW test strip TEST BLOOD SUGAR THREE TIMES DAILY OR AS DIRECTED 300 each 3   • benzonatate (Tessalon Perles) 100 MG capsule Take 1 capsule by mouth 3 (Three) Times a Day As Needed for Cough. (Patient taking differently: Take 100 mg by mouth Every 8 (Eight) Hours As Needed for Cough.)     • bisacodyl (DULCOLAX) 10 MG suppository Insert 1 suppository into the rectum Daily As Needed for Constipation (Use if bisacodyl oral is ineffective).     • Blood Glucose Monitoring Suppl (ACCU-CHEK KENNETH SMARTVIEW) w/Device kit TEST blood sugar three times daily or as directed 1 kit 0   • desvenlafaxine (PRISTIQ) 50 MG 24 hr tablet TAKE ONE TABLET BY MOUTH EVERY DAY (Patient taking differently: Take 50 mg by mouth Daily.) 90 tablet 1   • Diclofenac Sodium (VOLTAREN) 1 % gel gel Apply 2 g topically to the appropriate area as directed Every 6 (Six) Hours As Needed. Apply 2g to Left shoulder topically every 6 hours as needed for pain.     • epoetin chu-epbx (Retacrit) 19501 UNIT/ML injection Inject 60,000 Units under the skin into the appropriate area as directed. Inject 1.5 mL subcutaneously every day shift every Friday.     • HYDROcodone-acetaminophen (NORCO) 5-325 MG per tablet Take 1 tablet by mouth 2 (Two) Times a Day As Needed.     • insulin aspart (novoLOG) 100 UNIT/ML injection Inject 5 Units under the skin into the appropriate area as directed 3 (Three) Times a Day With Meals. (Patient taking differently: Inject  under the skin into the appropriate area as directed 3 (Three) Times a Day As Needed. Per sliding scale)  12   • melatonin 5 MG tablet tablet Take 1 tablet by mouth At Night As Needed (Insomnia).     • metaxalone (SKELAXIN) 400 MG tablet Take 1 tablet by mouth 3 (Three) Times a Day As Needed for Muscle Spasms. (Patient taking differently: Take 400 mg by mouth Every 8 (Eight) Hours As Needed (for muscle spasms).)     • midodrine (PROAMATINE) 2.5 MG tablet Take 1  "tablet by mouth 3 (Three) Times a Day. 90 tablet 6   • Nebulizer device 1 each Take As Directed. 1 each 0   • Needle, Disp, (BD DISP NEEDLES) 30G X 1/2\" misc To be used 3 times daily with Novolog Flexpen. 100 each 5   • NovoLOG FlexPen 100 UNIT/ML solution pen-injector sc pen 3 (Three) Times a Day With Meals.     • Nutritional Supplements (Glucerna 1.0 Khai/CarbSteady) liquid Take 240 mL by mouth Daily. 7200 mL 11   • nystatin (MYCOSTATIN) 271961 UNIT/GM cream Apply 1 application topically to the appropriate area as directed As Needed.     • O2 (OXYGEN) Inhale 2 L/min Continuous. 1.5 L continuous with 2 L nightly     • omeprazole (priLOSEC) 40 MG capsule Take 1 capsule by mouth 2 (two) times a day. 180 capsule 3   • polyethylene glycol (MIRALAX) 17 g packet Take 17 g by mouth Daily. (Patient taking differently: Take 17 g by mouth 2 (Two) Times a Day As Needed.)     • promethazine (PHENERGAN) 25 MG tablet Take 25 mg by mouth Every 6 (Six) Hours As Needed for Nausea or Vomiting.     • sennosides-docusate (senna-docusate sodium) 8.6-50 MG per tablet Take 2 tablets by mouth Daily. (Patient taking differently: Take 2 tablets by mouth Every Night.) 60 tablet 3   • tamsulosin (FLOMAX) 0.4 MG capsule 24 hr capsule Take 1 capsule by mouth Daily. (Patient taking differently: Take 0.4 mg by mouth Every Night.) 30 capsule    • UltiCare Mini Pen Needles 31G X 6 MM misc USE TO inject insulins FOUR TIMES DAILY AS DIRECTED 400 each 3   • Ventolin  (90 Base) MCG/ACT inhaler INHALE TWO PUFFS BY MOUTH EVERY 4 HOURS AS NEEDED FOR WHEEZING (Patient taking differently: Inhale 2 puffs Every 4 (Four) Hours As Needed for Wheezing.) 18 g 3        ALLERGIES:    Allergies   Allergen Reactions   • Baclofen Anxiety     Panic attack, nightmares   • Codeine Itching and Rash   • Lisinopril Cough   • Morphine Hives   • Penicillins Rash     Tolerates cephalosporins         Social History     Socioeconomic History   • Marital status:    • " Number of children: 3   • Years of education: High School   Tobacco Use   • Smoking status: Never Smoker   • Smokeless tobacco: Never Used   • Tobacco comment: CAFFEINE USE: NONE   Vaping Use   • Vaping Use: Never used   Substance and Sexual Activity   • Alcohol use: No   • Drug use: No   • Sexual activity: Defer     Comment: EXERCISE - RARELY        Family History   Problem Relation Age of Onset   • Lupus Mother    • Heart failure Mother 59   • Heart disease Other    • Hypertension Other    • Heart attack Father    • Breast cancer Neg Hx         Review of Systems   Constitutional: Positive for activity change, appetite change and fatigue.   HENT: Positive for postnasal drip and rhinorrhea.    Respiratory: Positive for cough, shortness of breath and wheezing.    Cardiovascular: Negative.    Gastrointestinal: Negative.    Genitourinary: Negative.    Musculoskeletal: Negative.    Neurological: Negative.    Hematological: Negative.    Psychiatric/Behavioral: The patient is nervous/anxious.           Objective     Vitals:    01/07/22 0716 01/07/22 0724 01/07/22 1110 01/07/22 1141   BP:    131/58   BP Location:    Right arm   Patient Position:    Lying   Pulse: 76 76 76 76   Resp: 20 20 20 18   Temp:    97.5 °F (36.4 °C)   TempSrc:    Oral   SpO2: 91% 92% 93% 95%   Weight:       Height:         Current Status 7/28/2021   ECOG score 2       Physical Exam  Vitals and nursing note reviewed.   Constitutional:       General: She is not in acute distress.     Appearance: She is ill-appearing. She is not toxic-appearing or diaphoretic.   HENT:      Head: Normocephalic.   Eyes:      General: No scleral icterus.  Cardiovascular:      Rate and Rhythm: Normal rate and regular rhythm.      Pulses: Normal pulses.      Heart sounds: Normal heart sounds.   Pulmonary:      Effort: Pulmonary effort is normal. No respiratory distress.      Breath sounds: No stridor. Wheezing and rhonchi present. No rales.   Chest:      Chest wall: No  tenderness.   Abdominal:      General: Abdomen is flat.      Palpations: Abdomen is soft. There is no mass.      Tenderness: There is no abdominal tenderness. There is no guarding or rebound.   Musculoskeletal:         General: No swelling, tenderness or deformity.   Skin:     General: Skin is warm and dry.      Coloration: Skin is pale.      Findings: No erythema or rash.   Neurological:      General: No focal deficit present.      Mental Status: She is alert and oriented to person, place, and time.   Psychiatric:         Behavior: Behavior normal.         Thought Content: Thought content normal.       I HAVE PERSONALLY REVIEWED THE HISTORY OF THE PRESENT ILLNESS, PAST MEDICAL HISTORY, FAMILY HISTORY, SOCIAL HISTORY, ALLERGIES, MEDICATIONS STATED ABOVE IN THE  NOTE FROM TODAY.              RECENT LABS:  Hematology WBC   Date Value Ref Range Status   01/07/2022 1.58 (C) 3.40 - 10.80 10*3/mm3 Final     RBC   Date Value Ref Range Status   01/07/2022 3.39 (L) 3.77 - 5.28 10*6/mm3 Final     Hemoglobin   Date Value Ref Range Status   01/07/2022 9.3 (L) 12.0 - 15.9 g/dL Final     Hematocrit   Date Value Ref Range Status   01/07/2022 30.4 (L) 34.0 - 46.6 % Final     Platelets   Date Value Ref Range Status   01/07/2022 105 (L) 140 - 450 10*3/mm3 Final        Contains abnormal data Urine Culture - Urine, Urine, Clean Catch  Order: [id number] - Reflex for Order [id number]   Status: Final result     Visible to patient: Yes (not seen)     Next appt: 01/27/2022 at 07:00 AM in Radiology ([facility])    Specimen Information: Urine, Clean Catch         0 Result Notes    Urine Culture >100,000 CFU/mL Escherichia coli Abnormal        <25,000 CFU/mL Mixed Kedar Isolated          Specimen contains mixed organisms of questionable pathogenicity which indicates contamination with commensal kedar.  Further identification is unlikely to provide clinically useful information.  Suggest recollection.        Resulting Agency: [facility]   Contains  critical data Blood Culture - Blood, Arm, Left  Order: [id number]   Status: Final result     Visible to patient: Yes (not seen)     Next appt: 01/27/2022 at 07:00 AM in Radiology ([facility])    Specimen Information: Arm, Left; Blood         0 Result Notes    Blood Culture Staphylococcus, coagulase negative Critical        Isolated from Aerobic Bottle          Probable contaminant requires clinical correlation, susceptibility not performed unless requested by physician.             Gram Stain   Critical   Aerobic Bottle Gram positive cocci in clusters              Resulting Agency: [facility]     Blood Culture - Blood, Arm, Left  Order: [id number]   Status: Preliminary result     Visible to patient: No (not released)     Next appt: 01/27/2022 at 07:00 AM in Radiology ([facility])    Specimen Information: Arm, Left; Blood         0 Result Notes    Blood Culture No growth at 4 days            Resulting Agency: [facility]           Specimen Collected: 01/03/22 06:10 Last Resulted: 01/07/22 07:00               Assessment/Plan    In summary, this is a 81-year-old white female who has chronic kidney disease, abnormal creatinine who has pancytopenia associated with myelodysplastic syndrome 5q minus syndrome has been seen in the past by my partner, Dr. Elieser Headley.  The patient has been admitted to the hospital on this occasion with an E. coli urinary tract infection and also she has been found to have respiratory syncytial virus.  She has been tested for COVID and negative.  She has significant respiratory symptomatology, actually the urinary tract infection produces minimal if any at all.  The patient has had also a positive blood culture that is very likely an error in the lab taking the sample.  A 2nd blood culture was negative for any bacterial pathogen.  In any event, the patient has been requested in consultation today in regard on what to do with her leukopenia and her neutropenia.  My advice to her as has been done by  my partner, Dr. Headley, in the past is to proceed with utilization of Neupogen at a dose of 300 mcg subcutaneously daily for the next 3 days.      The patient hopefully will bounce back with a blood count that will allow her to fight infection and improve in regards to symptomatology of respiratory tract and urinary tract.    I would not be surprised if the patient in the long run also needs to have a transfusion of red cells.     In regards to her low platelet count there is no need for any intervention, but remind yourselves that the patient is taking Eliquis and need to be extremely careful if the platelet count goes below 60,000, I prefer for this patient to hold off any form of anticoagulation.    The patient will receive the Neupogen today, tomorrow and Sunday and we will review her back on Monday with daily laboratory parameters.     Otherwise, no other intervention from my point of view.     I personally reviewed the records provided by my partner, Dr. Grey Headley and stated and posted above.      DURING THE VISIT WITH THE PATIENT TODAY , PATIENT HAD FACE MASK, I HAD PROPER PROTECTIVE EQUIPMENT, AND I DID HAND HYGIENE WITH SOAP AND WATER BEFORE AND AFTER THE VISIT.

## 2022-01-08 NOTE — PROGRESS NOTES
"Hospitalist Team      Patient Care Team:  Marcy Goncalves APRN as PCP - General (Family Medicine)  Christos Rob MD as Surgeon (General Surgery)  German Wylie MD as Surgeon (Orthopedic Surgery)  Yasmani Baugh MD as Consulting Physician (Nephrology)  Yasmani Baugh MD as Referring Physician (Nephrology)  Elieser Headley MD as Consulting Physician (Hematology and Oncology)  Emmanuel Rogers MD as Consulting Physician (Gastroenterology)      Chief Complaint: Follow-up Leukopenia; RSV    Subjective    Not feeling too well this afternoon, but she is eating better.  She denies chest pain and dyspnea.  Nausea relatively unchanged.  She hasn't been up in the chair yet today, but she was up quite a bit yesterday.    Objective    Vital Signs  Temp:  [97.5 °F (36.4 °C)-98.3 °F (36.8 °C)] 98.3 °F (36.8 °C)  Heart Rate:  [75-91] 86  Resp:  [16] 16  BP: (101-146)/(55-69) 109/63  Oxygen Therapy  SpO2: 92 %  Pulse Oximetry Type: Intermittent  Device (Oxygen Therapy): nasal cannula  Device (Oxygen Therapy): nasal cannula  Flow (L/min): 1}    Flowsheet Rows      First Filed Value   Admission Height 154 cm (60.63\") Documented at 01/03/2022 0734   Admission Weight 76.2 kg (168 lb) Documented at 01/03/2022 0357          Physical Exam:    General: Appears stated age in no acute distress.  Lungs: Clear throughout all fields.  Respirations are non-labored.  CV: Irregular rate and rhythm.  I appreciate no murmur.  Radial pulses are 2+ and symmetric.  Abdomen: Obese, soft, and non-tender w/ active bowel sounds.  MSK: No C/C/E.  Neuro: CN II-XII grossly intact.  Psych: Pleasant affect.  Ox3.    Results Review:     I reviewed the patient's new clinical results.    Lab Results (last 24 hours)     Procedure Component Value Units Date/Time    POC Glucose Once [[id number]]  (Abnormal) Collected: 01/08/22 1138    Specimen: Blood Updated: 01/08/22 1145     Glucose 158 mg/dL      Comment: Meter: [id number] " : [id number] Justice THOMASA       CBC & Differential [[id number]]  (Abnormal) Collected: 01/08/22 0801    Specimen: Blood Updated: 01/08/22 0815    Narrative:      The following orders were created for panel order CBC & Differential.  Procedure                               Abnormality         Status                     ---------                               -----------         ------                     CBC Auto Differential[[id number]]        Abnormal            Final result               Scan Slide[[id number]]                                                                    Please view results for these tests on the individual orders.    CBC Auto Differential [[id number]]  (Abnormal) Collected: 01/08/22 0801    Specimen: Blood Updated: 01/08/22 0810     WBC 5.04 10*3/mm3      RBC 3.37 10*6/mm3      Hemoglobin 9.4 g/dL      Hematocrit 30.8 %      MCV 91.4 fL      MCH 27.9 pg      MCHC 30.5 g/dL      RDW 18.4 %      RDW-SD 60.6 fl      MPV --     Comment: Unable to calculate        Platelets 91 10*3/mm3      Neutrophil % 76.6 %      Lymphocyte % 10.1 %      Monocyte % 8.9 %      Eosinophil % 2.6 %      Basophil % 0.6 %      Immature Grans % 1.2 %      Neutrophils, Absolute 3.86 10*3/mm3      Lymphocytes, Absolute 0.51 10*3/mm3      Monocytes, Absolute 0.45 10*3/mm3      Eosinophils, Absolute 0.13 10*3/mm3      Basophils, Absolute 0.03 10*3/mm3      Immature Grans, Absolute 0.06 10*3/mm3      nRBC 0.0 /100 WBC     POC Glucose Once [[id number]]  (Abnormal) Collected: 01/08/22 0747    Specimen: Blood Updated: 01/08/22 0754     Glucose 142 mg/dL      Comment: Meter: [id number] : [id number] Justice Simpson CNA       Blood Culture - Blood, Arm, Left [[id number]]  (Normal) Collected: 01/03/22 0610    Specimen: Blood from Arm, Left Updated: 01/08/22 0700     Blood Culture No growth at 5 days    POC Glucose Once [[id number]]  (Abnormal) Collected: 01/07/22 2147    Specimen: Blood Updated: 01/07/22 2153     Glucose  163 mg/dL      Comment: Meter: [id number] : [id number] Amita DICK       Respiratory Panel PCR w/COVID-19(SARS-CoV-2) DYLAN/MORRIS/SATYA/PAD/COR/MAD/MICKIE In-House, NP Swab in UTM/VTM, 3-4 HR TAT - Swab, Nasopharynx [[id number]]  (Abnormal) Collected: 01/06/22 1302    Specimen: Swab from Nasopharynx Updated: 01/07/22 1724     ADENOVIRUS, PCR Not Detected     Coronavirus 229E Not Detected     Coronavirus HKU1 Not Detected     Coronavirus NL63 Not Detected     Coronavirus OC43 Not Detected     COVID19 Not Detected     Human Metapneumovirus Not Detected     Human Rhinovirus/Enterovirus Not Detected     Influenza A PCR Not Detected     Influenza B PCR Not Detected     Parainfluenza Virus 1 Not Detected     Parainfluenza Virus 2 Not Detected     Parainfluenza Virus 3 Not Detected     Parainfluenza Virus 4 Not Detected     RSV, PCR Detected     Bordetella pertussis pcr Not Detected     Bordetella parapertussis PCR Not Detected     Chlamydophila pneumoniae PCR Not Detected     Mycoplasma pneumo by PCR Not Detected    Narrative:      In the setting of a positive respiratory panel with a viral infection PLUS a negative procalcitonin without other underlying concern for bacterial infection, consider observing off antibiotics or discontinuation of antibiotics and continue supportive care. If the respiratory panel is positive for atypical bacterial infection (Bordetella pertussis, Chlamydophila pneumoniae, or Mycoplasma pneumoniae), consider antibiotic de-escalation to target atypical bacterial infection.    Vitamin B12 & Folate [[id number]]  (Normal) Collected: 01/07/22 0800    Specimen: Blood Updated: 01/07/22 1652     Folate 7.97 ng/mL      Vitamin B-12 682 pg/mL     Narrative:      Results may be falsely increased if patient taking Biotin.      POC Glucose Once [[id number]]  (Normal) Collected: 01/07/22 1635    Specimen: Blood Updated: 01/07/22 1651     Glucose 99 mg/dL      Comment: Meter: [id number] : [id number] Mings  [facility] NURSING ASSISTANT             Imaging Results (Last 24 Hours)     ** No results found for the last 24 hours. **          Medication Review:   I have reviewed the patient's current medication list    Current Facility-Administered Medications:   •  acetaminophen (TYLENOL) tablet 650 mg, 650 mg, Oral, Q4H PRN **OR** acetaminophen (TYLENOL) 160 MG/5ML solution 650 mg, 650 mg, Oral, Q4H PRN **OR** acetaminophen (TYLENOL) suppository 650 mg, 650 mg, Rectal, Q4H PRN, Mcintosh, Birrilla, DO  •  amiodarone (PACERONE) tablet 200 mg, 200 mg, Oral, Daily, Maury Denton, DO, 200 mg at 01/08/22 0842  •  apixaban (ELIQUIS) tablet 5 mg, 5 mg, Oral, BID, Maury Denton, DO, 5 mg at 01/08/22 0841  •  atorvastatin (LIPITOR) tablet 10 mg, 10 mg, Oral, Nightly, Maury Denton, DO, 10 mg at 01/07/22 2136  •  benzonatate (TESSALON) capsule 100 mg, 100 mg, Oral, Q8H PRN, Maury Denton, DO  •  bisacodyl (DULCOLAX) EC tablet 5 mg, 5 mg, Oral, Daily PRN, Maury Denton, DO  •  bisacodyl (DULCOLAX) suppository 10 mg, 10 mg, Rectal, Daily PRN, Maury Denton, DO  •  cefdinir (OMNICEF) capsule 300 mg, 300 mg, Oral, Q12H, Elton Guzman MD, 300 mg at 01/08/22 0837  •  desvenlafaxine (PRISTIQ) 24 hr tablet 50 mg, 50 mg, Oral, Daily, Maury Denton, DO, 50 mg at 01/08/22 0841  •  dextrose (D50W) (25 g/50 mL) IV injection 25 g, 25 g, Intravenous, Q15 Min PRN, Mcintosh, Birrilla, DO, 25 g at 01/05/22 0640  •  dextrose (GLUTOSE) oral gel 15 g, 15 g, Oral, Q15 Min PRN, Mcintosh, Birrilla, DO, 15 g at 01/06/22 1226  •  Filgrastim (NEUPOGEN) injection 300 mcg, 300 mcg, Subcutaneous, Daily With Lunch, Kristopher Ceballos MD, 300 mcg at 01/08/22 1242  •  gabapentin (NEURONTIN) capsule 100 mg, 100 mg, Oral, TID, Maury Denton DO, 100 mg at 01/08/22 0837  •  glucagon (GLUCAGEN) injection 1 mg, 1 mg, Subcutaneous, Q15 Min PRN, Vladimir Mcintosh DO  •  guaiFENesin-dextromethorphan (ROBITUSSIN DM) 100-10 MG/5ML syrup 5 mL, 5 mL, Oral, Q4H PRN, Vladimir Mcintosh DO, 5  mL at 01/04/22 1324  •  HYDROcodone-acetaminophen (NORCO) 5-325 MG per tablet 1 tablet, 1 tablet, Oral, BID PRN, Maury Denton, DO, 1 tablet at 01/08/22 0604  •  insulin aspart (novoLOG) injection 0-7 Units, 0-7 Units, Subcutaneous, TID AC, Mcintosh, Jose Danielrilla, DO, 2 Units at 01/08/22 1237  •  ipratropium-albuterol (DUO-NEB) nebulizer solution 3 mL, 3 mL, Nebulization, Q4H PRN, McintoshJose Danielrialexisa, DO  •  ipratropium-albuterol (DUO-NEB) nebulizer solution 3 mL, 3 mL, Nebulization, 4x Daily - RT, Vladimir Mcintosh, DO, 3 mL at 01/08/22 1216  •  levothyroxine (SYNTHROID, LEVOTHROID) tablet 88 mcg, 88 mcg, Oral, Daily, Maury Denton, DO, 88 mcg at 01/08/22 0841  •  melatonin tablet 5 mg, 5 mg, Oral, Nightly PRN, Maury Denton, DO  •  midodrine (PROAMATINE) tablet 2.5 mg, 2.5 mg, Oral, TID AC, Maury Denton, DO, 2.5 mg at 01/08/22 1240  •  ondansetron (ZOFRAN) tablet 4 mg, 4 mg, Oral, Q6H PRN, 4 mg at 01/08/22 0033 **OR** ondansetron (ZOFRAN) injection 4 mg, 4 mg, Intravenous, Q6H PRN, Vladimir Mcintosh, DO, 4 mg at 01/07/22 1633  •  pantoprazole (PROTONIX) EC tablet 40 mg, 40 mg, Oral, QAM, Maury Denton, DO, 40 mg at 01/08/22 0600  •  polyethylene glycol (MIRALAX) packet 17 g, 17 g, Oral, BID PRN, Maury Denton, DO  •  potassium chloride (K-DUR,KLOR-CON) CR tablet 40 mEq, 40 mEq, Oral, PRN **OR** potassium chloride (KLOR-CON) packet 40 mEq, 40 mEq, Oral, PRN **OR** potassium chloride 10 mEq in 100 mL IVPB, 10 mEq, Intravenous, Q1H PRN, Mcintosh, Birrilla, DO, Stopped at 01/04/22 1325  •  promethazine (PHENERGAN) tablet 6.25 mg, 6.25 mg, Oral, Q6H PRN, Maury Denton, DO, 6.25 mg at 01/06/22 1729  •  sennosides-docusate (PERICOLACE) 8.6-50 MG per tablet 2 tablet, 2 tablet, Oral, Nightly, Maury Denton, DO, 2 tablet at 01/08/22 0033  •  Insert peripheral IV, , , Once **AND** sodium chloride 0.9 % flush 10 mL, 10 mL, Intravenous, PRN, Mcintosh, Birrilla, DO  •  sodium chloride 0.9 % flush 10 mL, 10 mL, Intravenous, PRN, Mcintosh,  "DO Vladimir  •  sodium chloride 0.9 % flush 10 mL, 10 mL, Intravenous, Q12H, McintoshJose Danielrilla, DO, 10 mL at 01/08/22 0938  •  sodium chloride 0.9 % infusion 40 mL, 40 mL, Intravenous, PRN, Vladimir Mcintosh DO, 40 mL at 01/04/22 1216  •  tamsulosin (FLOMAX) 24 hr capsule 0.4 mg, 0.4 mg, Oral, Nightly, Maury Denton, DO, 0.4 mg at 01/07/22 2136    Facility-Administered Medications Ordered in Other Encounters:   •  acetaminophen (TYLENOL) tablet 650 mg, 650 mg, Oral, Q6H PRN, Elieser Headley MD  •  diphenhydrAMINE (BENADRYL) capsule 25 mg, 25 mg, Oral, Once, Elieser Headley MD      Assessment/Plan     1.  Leukopenia: Appreciate Heme/Onc eval.  Leukopenia has resolved after the administration of CSF.  MDS versus RSV.     2.  E. Coli UTI: Continues on Cefdinir.     3.  Chronic Aspiration: She has completed five days of Flagyl and continues on Cefdinir.  Procalcitonin has trended down so could argue for mild bacterial component.  SLP had no further recommendations given her history of non-compliance.     4.  RSV Infection: Conservative care.    5.  Hypoxia:  Remains stable on 1L.     6.  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  As in #1.     7.  Hypothyroidism: No acute issues.  Continue current replacement dose.     8.  Chronic dCHF: No acute issues.  She would be a good candidate for Jardiance therapy, but given her frequent UTI's this may be a Risk>>Benefit.     9.  Hypertension: At goal on exam.  No change to current regimen.     10.  Diabetes Mellitus, Type 2 in Obese w/ Gastroparesis: Bedsides good.  No change to regimen.     11.  Depression: No acute issues.     12.  CKD IV: Creatinine was a little better than baseline last check.    Plan for disposition: Ready for discharge but now waiting on a precert?  I find it unethical to deprive this patient of the bed she already has to bring her back under a different financial class to burn \"skilled\" days she may have to use in " the future.  What will happen if she would need those days?  Discussed w/ .  Please see their note.  This behavior seems very odd.    Elton Guzman MD  01/08/22  13:50 EST

## 2022-01-08 NOTE — PLAN OF CARE
Goal Outcome Evaluation:              Outcome Summary: Pt alert and oriented and complains of pain during this shift. Pt complains of nausea and requests Zofran which appeared to be effective. VSS at this time, will continue to monitor.

## 2022-01-08 NOTE — PLAN OF CARE
Goal Outcome Evaluation:  Plan of Care Reviewed With: patient        Progress: improving  Outcome Summary: vss. pt alert and oriented. no complaints of pain or nausea at this time. awaiting pre-cert for patient to return to her facility. no other complaints at this time.

## 2022-01-08 NOTE — CASE MANAGEMENT/SOCIAL WORK
"Continued Stay Note   Angie Doran     Patient Name: Joya Singh  MRN: [id number]  Today's Date: 1/8/2022    Admit Date: 1/3/2022     Discharge Plan     Row Name 01/08/22 1043       Plan    Plan [facility] pending pre cert    Plan Comments Per Dr. Guzman if patient is stable he will discharge her back to [facility] today. ADOLPH called [facility] and spoke with Chandrika OCONNELL to see inform her that patient may return today and she states she will have to call [facility] in  to see if patient can return. Chandrika with return call and states that per [facility] L[facility] she will need a pre cert and will not be able to return until they get that. Asked Chandrika if she could call Ana Lilia back and give her my phone number so that I could speak with her directly. Ana Lilia returned call and ADOLPH informed her that on 1/4/22 when patient came in one of our  spoke with Gianna who states that patient had a medical bed hold and could return when ready and did no say that patient would need a pre cert. Ana Lilia states \"since the patient has had a three midnight stay she will return as skilled and therefore requires a pre cert per her insurance. She states that they can get that started today but patient would not be able to return this weekend. Dr. Guzman updated. CM will continue to follow for needs.               Discharge Codes    No documentation.                     Luann Catalan, ANISH    "

## 2022-01-09 NOTE — PLAN OF CARE
Goal Outcome Evaluation:  Plan of Care Reviewed With: patient        Progress: no change  Outcome Summary: alert and oriented - c/o nausea and states she lives with nausea daily - pt says she has gastroparesis - 1L oxygen - bed alarm placed - midline in place - awaiting precert for [facility]

## 2022-01-09 NOTE — PLAN OF CARE
Goal Outcome Evaluation:  Plan of Care Reviewed With: patient        Progress: improving  Outcome Summary: vss. alert and oriented - medicated once for nausea. pt says 1L oxygen in place  - bed alarm placed - midline in place - awaiting precert for [facility]. Pt has no other complaints at this time.

## 2022-01-09 NOTE — PROGRESS NOTES
"Daily Progress Note:      Chief complaint: Follow-up of acute hypoxic respiratory failure secondary aspiration pneumonia, urinary tract infection E. coli, chronic diastolic congestive heart.,  Hypertension, adult-onset diabetes, depression, chronic kidney disease stage IV    Subjective: Some generalized weakness appetite is improved persistent nausea     LOS: 4 days     Vital Signs  Temp:  [97.3 °F (36.3 °C)-98.1 °F (36.7 °C)] 98.1 °F (36.7 °C)  Heart Rate:  [75-94] 88  Resp:  [16-20] 18  BP: (106-124)/(63-67) 124/66  Oxygen Therapy  SpO2: 93 %  Pulse Oximetry Type: Intermittent  Device (Oxygen Therapy): nasal cannula  Device (Oxygen Therapy): humidified, nasal cannula  Flow (L/min): 1}  Body mass index is 29.89 kg/m².  Flowsheet Rows      First Filed Value   Admission Height 154 cm (60.63\") Documented at 01/03/2022 0734   Admission Weight 76.2 kg (168 lb) Documented at 01/03/2022 0357                   Documented weights    01/03/22 0357 01/03/22 0840   Weight: 76.2 kg (168 lb) 74.1 kg (163 lb 6.4 oz)           Patient Vitals for the past 24 hrs:   BP Temp Temp src Pulse Resp SpO2   01/09/22 0848 124/66 -- -- 88 -- --   01/09/22 0722 -- -- -- 77 18 93 %   01/09/22 0617 118/67 98.1 °F (36.7 °C) Oral 75 18 97 %   01/08/22 2009 106/67 97.8 °F (36.6 °C) Oral 94 18 90 %   01/08/22 1616 114/63 97.3 °F (36.3 °C) Oral 87 20 95 %   01/08/22 1551 -- -- -- 82 20 94 %   01/08/22 1240 109/63 -- -- 86 -- --   01/08/22 1221 -- -- -- 78 16 92 %   01/08/22 1216 -- -- -- 76 16 95 %       74.1 kg (163 lb 6.4 oz)    Intake/Output                             01/07/22 0701 - 01/08/22 0700 01/08/22 0701 - 01/09/22 0700 01/09/22 0701 - 01/10/22 0700     [id number] 1901-0700 Total 0701-1900 1901-0700 Total 0701-1900 1901-0700 Total                    Intake    P.O.  480  -- 480  240  -- 240  240  -- 240    Total Intake 480 -- 480 240 -- 240 240 -- 240       Output    Urine  --  -- --  200  -- 200  --  -- --    Total Output -- -- -- 200 -- " 200 -- -- --           Intake/Output Summary (Last 24 hours) at 1/9/2022 1033  Last data filed at 1/9/2022 0900  Gross per 24 hour   Intake 360 ml   Output 200 ml   Net 160 ml        Intake/Output Summary (Last 24 hours) at 1/9/2022 1033  Last data filed at 1/9/2022 0900  Gross per 24 hour   Intake 360 ml   Output 200 ml   Net 160 ml        Review of Systems   Constitutional: Positive for activity change and appetite change. Negative for fatigue.   HENT: Negative for congestion.    Respiratory: Negative for cough, chest tightness, shortness of breath and wheezing.    Cardiovascular: Negative for chest pain.   Gastrointestinal: Positive for nausea. Negative for abdominal distention, abdominal pain, diarrhea and vomiting.   Endocrine: Negative for polyphagia and polyuria.   Genitourinary: Negative for frequency.   Musculoskeletal: Positive for back pain.   Skin: Negative for rash.   Neurological: Positive for weakness. Negative for light-headedness.   Hematological: Does not bruise/bleed easily.   Psychiatric/Behavioral: Negative for agitation and behavioral problems.       Physical Exam  Vitals and nursing note reviewed.   Constitutional:       Appearance: She is well-developed. She is obese. She is ill-appearing (Chronically ill-appearing).   HENT:      Head: Normocephalic.   Eyes:      Conjunctiva/sclera: Conjunctivae normal.   Neck:      Thyroid: No thyromegaly.      Vascular: No JVD.   Cardiovascular:      Rate and Rhythm: Normal rate and regular rhythm.      Heart sounds: Normal heart sounds. No murmur heard.       Comments: Trace bilateral lower extremity edema  Pulmonary:      Effort: Pulmonary effort is normal. No respiratory distress.      Breath sounds: Decreased breath sounds present. No wheezing or rales.   Abdominal:      General: Bowel sounds are normal. There is no distension.      Palpations: Abdomen is soft.      Tenderness: There is no abdominal tenderness. There is no guarding.   Skin:     General:  Skin is warm and dry.      Findings: No rash.   Neurological:      General: No focal deficit present.      Mental Status: She is alert and oriented to person, place, and time.   Psychiatric:         Attention and Perception: Attention normal.         Mood and Affect: Mood normal.         Medication Review:   I have reviewed the patient's current medication list  Scheduled Meds:amiodarone, 200 mg, Oral, Daily  apixaban, 5 mg, Oral, BID  atorvastatin, 10 mg, Oral, Nightly  cefdinir, 300 mg, Oral, Q12H  desvenlafaxine, 50 mg, Oral, Daily  filgrastim (NEUPOGEN) injection, 300 mcg, Subcutaneous, Daily With Lunch  gabapentin, 100 mg, Oral, TID  insulin aspart, 0-7 Units, Subcutaneous, TID AC  ipratropium-albuterol, 3 mL, Nebulization, 4x Daily - RT  levothyroxine, 88 mcg, Oral, Daily  midodrine, 2.5 mg, Oral, TID AC  pantoprazole, 40 mg, Oral, QAM  sennosides-docusate, 2 tablet, Oral, Nightly  sodium chloride, 10 mL, Intravenous, Q12H  tamsulosin, 0.4 mg, Oral, Nightly      Continuous Infusions:   PRN Meds:.•  acetaminophen **OR** acetaminophen **OR** acetaminophen  •  benzonatate  •  bisacodyl  •  bisacodyl  •  dextrose  •  dextrose  •  glucagon (human recombinant)  •  guaiFENesin-dextromethorphan  •  HYDROcodone-acetaminophen  •  ipratropium-albuterol  •  melatonin  •  ondansetron **OR** ondansetron  •  polyethylene glycol  •  potassium chloride **OR** potassium chloride **OR** potassium chloride  •  promethazine  •  Insert peripheral IV **AND** sodium chloride  •  sodium chloride  •  sodium chloride      Result Review    Result Review:  I have personally reviewed the results from the time of this admission to 1/9/2022 10:33 EST and agree with these findings:  [x]  Laboratory  [x]  Microbiology  [x]  Radiology  [x]  EKG/Telemetry   []  Cardiology/Vascular   []  Pathology  []  Old records  []  Other:          Labs:  Results from last 7 days   Lab Units 01/08/22  0801 01/07/22  0800 01/06/22  0705 01/05/22  0549  01/05/22  0549 01/04/22  1600 01/04/22  1600 01/04/22 0426 01/04/22 0426 01/03/22 0424 01/03/22 0424   WBC 10*3/mm3 5.04 1.58* 1.68*  --  1.80*  --   --   --  1.86*  --  3.13*   HEMOGLOBIN g/dL 9.4* 9.3* 9.1*   < > 7.2*   < > 7.6*   < > 6.3*   < > 7.3*   HEMATOCRIT % 30.8* 30.4* 29.8*  --  23.8*  --  24.6*  --  20.7*  --  23.5*   PLATELETS 10*3/mm3 91* 105* 100*  --  100*  --   --   --  101*  --  125*    < > = values in this interval not displayed.     Results from last 7 days   Lab Units 01/07/22  0800 01/06/22  0705 01/05/22 0549 01/04/22 1958 01/04/22 0426 01/03/22 0424   SODIUM mmol/L 138 133* 137  --  137 136   POTASSIUM mmol/L 4.1 4.1 3.8 3.9 3.1* 3.6   CHLORIDE mmol/L 107 104 106  --  104 100   CO2 mmol/L 22.3 24.2 23.6  --  24.3 25.6   BUN mg/dL 10 13 15  --  16 13   CREATININE mg/dL 1.27* 1.35* 1.36*  --  1.42* 1.23*   CALCIUM mg/dL 7.9* 7.8* 7.3*  --  6.9* 7.5*   BILIRUBIN mg/dL  --   --   --   --   --  0.8   ALK PHOS U/L  --   --   --   --   --  92   ALT (SGPT) U/L  --   --   --   --   --  8   AST (SGOT) U/L  --   --   --   --   --  12   GLUCOSE mg/dL 148* 171* 44*  --  114* 68     Results from last 7 days   Lab Units 01/05/22 0549 01/04/22 0426   MAGNESIUM mg/dL 1.6 1.2*       Results from last 7 days   Lab Units 01/08/22  0801 01/07/22  0800 01/06/22  0705 01/05/22  0549 01/05/22  0549 01/04/22  0426 01/03/22  0610 01/03/22  0424   AST (SGOT) U/L  --   --   --   --   --   --   --  12   ALT (SGPT) U/L  --   --   --   --   --   --   --  8   PROCALCITONIN ng/mL  --  0.46* 0.52*  --  0.61*   < >  --  0.51*   LACTATE mmol/L  --   --   --   --   --   --  1.0  --    PLATELETS 10*3/mm3 91* 105* 100*   < > 100*   < >  --  125*    < > = values in this interval not displayed.         Lab Results (last 24 hours)     Procedure Component Value Units Date/Time    POC Glucose Once [[id number]]  (Normal) Collected: 01/09/22 0719    Specimen: Blood Updated: 01/09/22 0725     Glucose 112 mg/dL      Comment:  Meter: [id number] : [id number] Lucille Gonzalezat NA       POC Glucose Once [[id number]]  (Abnormal) Collected: 01/08/22 2013    Specimen: Blood Updated: 01/08/22 2019     Glucose 149 mg/dL      Comment: Meter: [id number] : [id number] Amita Fabiana NA       POC Glucose Once [[id number]]  (Normal) Collected: 01/08/22 1709    Specimen: Blood Updated: 01/08/22 1714     Glucose 112 mg/dL      Comment: Meter: [id number] : [id number] Justice THOMASA       POC Glucose Once [[id number]]  (Abnormal) Collected: 01/08/22 1138    Specimen: Blood Updated: 01/08/22 1145     Glucose 158 mg/dL      Comment: Meter: [id number] : [id number] Justice THOMASA                           Results from last 7 days   Lab Units 01/05/22  0549 01/04/22  0426   MAGNESIUM mg/dL 1.6 1.2*                 Glucose   Date/Time Value Ref Range Status   01/09/2022 0719 112 70 - 130 mg/dL Final     Comment:     Meter: [id number] : [id number] Lucille Alicia Gonzalezat NA   01/08/2022 2013 149 (H) 70 - 130 mg/dL Final     Comment:     Meter: [id number] : [id number] Amita Jung NA   01/08/2022 1709 112 70 - 130 mg/dL Final     Comment:     Meter: [id number] : [id number] Justice Simpson CNA   01/08/2022 1138 158 (H) 70 - 130 mg/dL Final     Comment:     Meter: [id number] : [id number] Justice Simpson CNA   01/08/2022 0747 142 (H) 70 - 130 mg/dL Final     Comment:     Meter: [id number] : [id number] Justice Simpson CNA   01/07/2022 2147 163 (H) 70 - 130 mg/dL Final     Comment:     Meter: [id number] : [id number] Amita Fabiana NA   01/07/2022 1635 99 70 - 130 mg/dL Final     Comment:     Meter: [id number] : [id number] Ynes Rosenthal NURSING ASSISTANT   01/07/2022 1207 253 (H) 70 - 130 mg/dL Final     Comment:     Meter: [id number] : [id number] Ynes Rosenthal NURSING ASSISTANT     Results from last 7 days   Lab Units 01/07/22  0800 01/06/22  0705 01/05/22  0549 01/04/22  0426 01/03/22  0610 01/03/22  0424    PROCALCITONIN ng/mL 0.46* 0.52* 0.61* 0.70*  --  0.51*   LACTATE mmol/L  --   --   --   --  1.0  --      Results from last 7 days   Lab Units 01/03/22  0610 01/03/22  0505   BLOODCX  No growth at 5 days  Staphylococcus, coagulase negative*  --    URINECX   --  >100,000 CFU/mL Escherichia coli*  <25,000 CFU/mL Mixed Ju Isolated   BCIDPCR  Staph spp, not aureus or lugdunesis. Identification by BCID2 PCR.*  --    STREP PNEUMO AG   --  Negative   L. PNEUMOPHILA SEROGP 1 UR AG   --  Negative     Results from last 7 days   Lab Units 01/03/22  0505   NITRITE UA  Positive*   WBC UA /HPF 13-20*   BACTERIA UA /HPF 4+*   SQUAM EPITHEL UA /HPF None Seen   URINECX  >100,000 CFU/mL Escherichia coli*  <25,000 CFU/mL Mixed Ju Isolated     Results from last 7 days   Lab Units 01/06/22  1302   ADENOVIRUS DETECTION BY PCR  Not Detected   CORONAVIRUS 229E  Not Detected   CORONAVIRUS HKU1  Not Detected   CORONAVIRUS NL63  Not Detected   CORONAVIRUS OC43  Not Detected   HUMAN METAPNEUMOVIRUS  Not Detected   HUMAN RHINOVIRUS/ENTEROVIRUS  Not Detected   INFLUENZA B PCR  Not Detected   PARAINFLUENZA 1  Not Detected   PARAINFLUENZA VIRUS 2  Not Detected   PARAINFLUENZA VIRUS 3  Not Detected   PARAINFLUENZA VIRUS 4  Not Detected   BORDETELLA PERTUSSIS PCR  Not Detected   CHLAMYDOPHILA PNEUMONIAE PCR  Not Detected   MYCOPLAMA PNEUMO PCR  Not Detected   INFLUENZA A PCR  Not Detected   RSV, PCR  Detected*         Radiology:  Imaging Results (Last 24 Hours)     ** No results found for the last 24 hours. **          Cardiology:  ECG/EMG Results (last 24 hours)     ** No results found for the last 24 hours. **          I have reviewed recent labs results and consult notes.    Please note portions of this assessment/plan may have been copied and pasted, but I have personally seen this patient and reviewed each line of this assessment and plan for accuracy and made updates to reflect my necessary changes    Assessment and Plan:    1.   Chronic aspiration pneumonia stable continue p.o. cefdinir    2.  Acute abatacept (secondary to chronic aspiration pneumonia improved stable on 1 L of O2    3.  E. coli UTI stable continue with present antibiotics    4.  Pancytopenia likely reactive hematology oncology following the patient has been ordered.  Earlier required transfusion now hemoglobin stable    5.  RSV infection symptomatic care improving    6.  Chronic heart failure with preserved ejection fraction no acute issues seems to be euvolemic    7.  Adult onset diabetes mellitus with gastroparesis relatively euglycemic continue with present medications    8. chronic kidney disease stage IV creatinine is better than baseline no acute issues    9. hypertension blood pressure at goal    10. Bell's palsy chronic left facial droop    11. history obstructive sleep apnea with nocturnal hypoxia not on CPAP    12. depression nothing acute continue home medications    13. generalized weakness waiting on precertification to go back to skilled nursing      Much of this encounter note is an electronic transcription/translation of spoken language to printed text using Dragon Software

## 2022-01-10 PROBLEM — J18.9 PNEUMONIA OF RIGHT LOWER LOBE DUE TO INFECTIOUS ORGANISM: Status: RESOLVED | Noted: 2022-01-01 | Resolved: 2022-01-01

## 2022-01-10 NOTE — PROGRESS NOTES
Adult Nutrition  Assessment/PES    Patient Name:  Joya Singh  YOB: 1940  MRN: [id number]  Admit Date:  1/3/2022    Assessment Date:  1/10/2022    Comments:  Pt meets criteria for moderate malnutrition with temporal wasting, poor po intake reported now > 3 months, reports 30# wt loss in 4 months.     Pt declines all oral supplements, and cannot give any snack preferences.    Recommend: change to soft whole foods ( not gi soft) to allow pt to have salad as she desires     Noted plan for transfer back to facility today.      Reason for Assessment     Row Name 01/10/22 1150          Reason for Assessment    Reason For Assessment follow-up protocol     Diagnosis hematological/related complications; infection/sepsis; diabetes diagnosis/complications; cardiac disease  PNA, luekpenia, UTI, chronic Aspiration, RSV, MDS hx CHF, DM                Nutrition/Diet History     Row Name 01/10/22 1155          Nutrition/Diet History    Typical Food/Fluid Intake Spoke w pt at bedside, reports salad sounds good but can't have on her diet. Pt won't drink supplements. Did eat her frosted flakes this am. Pt agreeable to phyiscal exam. Reports her son is so worried about her wt loss and muscle loss. She reports down from 197 # in 4 months. Asking what she can do about her muscles, she does report not active. Pt agreeable to physical exam.                Anthropometrics     Row Name 01/10/22 1156          Anthropometrics    Weight --  no new wt            Usual Body Weight (UBW)    Weight Loss Time Frame reports down from 197# 4 months ago. 34# reported            Body Mass Index (BMI)    BMI Assessment BMI 25-29.9: overweight                Labs/Tests/Procedures/Meds     Row Name 01/10/22 1157          Labs/Procedures/Meds    Lab Results Reviewed reviewed     Lab Results Comments glu 170, 154            Diagnostic Tests/Procedures    Diagnostic Test/Procedure Reviewed reviewed            Medications    Pertinent Medications  Reviewed reviewed     Pertinent Medications Comments novolog                Physical Findings     Row Name 01/10/22 1157          Physical Findings    Overall Physical Appearance loss of muscle mass                  Nutrition Prescription Ordered     Row Name 01/10/22 1157          Nutrition Prescription PO    Current PO Diet Soft Texture     Texture Whole foods     Common Modifiers GI Soft/[place]                Evaluation of Received Nutrient/Fluid Intake     Row Name 01/10/22 1158          Fluid Intake Evaluation    Oral Fluid (mL) 440  ave x 3, 29%            PO Evaluation    Number of Meals 8     % PO Intake 47                  Malnutrition Severity Assessment     Row Name 01/10/22 1158          Malnutrition Severity Assessment    Malnutrition Type Chronic Disease - Related Malnutrition            Insufficient Energy Intake     Insufficient Energy Intake  <75% of est. energy requirement for > or equal to 3 months            Unintentional Weight Loss     Unintentional Weight Loss  --  reports 17% BW loss in 4 months            Muscle Loss    Tem[place] Region Moderate - slight depression            Fat Loss    Orbital Region  Moderate -  somewhat hollowness, slightly dark circles            Declining Functional Status    Declining Functional Status Findings Measurably Reduced            Criteria Met (Must meet criteria for severity in at least 2 of these categories: M Wasting, Fat Loss, Fluid, Secondary Signs, Wt. Status, Intake)    Patient meets criteria for  Moderate (non-severe) Malnutrition                 Problem/Interventions:     Problem 2     Row Name 01/10/22 1200          Nutrition Diagnoses Problem 2    Problem 2 Malnutrition     Etiology (related to) Factors Affecting Nutrition; Medical Diagnosis     Signs/Symptoms (evidenced by) Report/Observation  MSA, wt loss, poor intake                    Intervention Goal     Row Name 01/10/22 1200          Intervention Goal    General Meet nutritional needs for  age/condition     PO Increase intake; PO intake (%)     PO Intake % 55 %  or greater     Weight No significant weight loss                Nutrition Intervention     Row Name 01/10/22 1200          Nutrition Intervention    RD/Tech Action Follow Tx progress                  Education/Evaluation     Row Name 01/10/22 1200          Education    Education Other (comment)  encourage adequate po of softer foods ( due to teeth), smaller amounts more often , pro each time.            Monitor/Evaluation    Monitor Per protocol; I&O; PO intake; Pertinent labs; Weight; Symptoms     Education Follow-up Other (comment)  pt only focused on nothing sounding good.                 Electronically signed by:  Cristiane Bush RD  01/10/22 12:01 EST

## 2022-01-10 NOTE — PLAN OF CARE
Goal Outcome Evaluation:  Plan of Care Reviewed With: patient        Progress: no change  Outcome Summary: alert and oriented - c/o nausea at start of shift that she states she lives with daily, given zofran with relief - family brought pt [employer] (oysters and fish) - headache relieved with tylenol - cough med given throughout the night - oxygen only at night - awaiting precert for [facility]

## 2022-01-10 NOTE — CASE MANAGEMENT/SOCIAL WORK
Continued Stay Note  VICKY Sharma     Patient Name: Joya Singh  MRN: [id number]  Today's Date: 1/10/2022    Admit Date: 1/3/2022     Discharge Plan     Row Name 01/10/22 1021       Plan    Plan [facility] - await precert    Plan Comments ELVIA Katz/[facility] informing patient had medicaid bedhold. Patient is ready for dc. Per notes, precert started 1/8 for patient to return to facility skilled level of care. Request return call for update on precert/ ability to accept patient today. CM will continue to follow.               Discharge Codes    No documentation.                     Alan Gruber RN

## 2022-01-10 NOTE — CASE MANAGEMENT/SOCIAL WORK
Continued Stay Note  VICKY Sharma     Patient Name: Joya Singh  MRN: [id number]  Today's Date: 1/10/2022    Admit Date: 1/3/2022     Discharge Plan     Row Name 01/10/22 1108       Plan    Plan [facility] - can accept patient today    Plan Comments Return call from [facility]. She states they have a private room for patient and can accept her back today on her medicaid bedhold. They request PT eval for patient to return skilled level of care. MD updated. No PT eval ordered. Patient will return to facility intrmediate level of care. [place] OCONNELL updated. Spoke with patients son, Isaac Singh, via phone update given of anticipate return to [facility] and [facility] updated. CALL REPORT [phone]. CM will continue to follow.    Row Name 01/10/22 1021       Plan    Plan [facility] - await precert    Plan Comments [facility] informing patient had medicaid bedhold. Patient is ready for dc. Per notes, precert started 1/8 for patient to return to facility skilled level of care. Request return call for update on precert/ ability to accept patient today. CM will continue to follow.               Discharge Codes    No documentation.                     Alan Gruber RN

## 2022-01-10 NOTE — DISCHARGE SUMMARY
Date of Admission: 1/3/2022    Date of Discharge:  1/10/2022    Length of stay:  LOS: 5 days     Presenting Problem:   Hypoxia [R09.02]  Anemia assoc with myelodysplastic syndrome treated with erythropoietin (HCC) [D46.9, D63.0]  Urinary tract infection in elderly patient [N39.0]  Pneumonia of right lower lobe due to infectious organism [J18.9]      Active Diagnosis During Hospital Stay/Discharge Diagnoses/Course by Diagnoses:    1.  Leukopenia: Appreciate Heme/Onc eval.  Leukopenia has resolved after the administration of CSF.   follow-up heme-onc outpatient with CBC at follow-up     2.  E. Coli UTI: Continues on Cefdinir for course.  1/4 bottle blood culture did grow coagulase-negative staph likely contaminant other bottles no growth at 5 days.     3.  Chronic Aspiration:  The above for antibiotics     4.  RSV Infection: Conservative care.  Abortive care at rehab     5.  Hypoxia:  Remains stable     6.  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  As in #1.     7.  Hypothyroidism: No acute issues.  Continue current replacement dose.     8.  Chronic dCHF: No acute issues.      9.  Hypertension: At goal on exam.  No change to current regimen.     10.  Diabetes Mellitus, Type 2 in Obese w/ Gastroparesis: Bedsides good.  No change to regimen.     11.  Depression: No acute issues.     12.  CKD IV: Creatinine was a little better than baseline last check.          Active Hospital Problems   No active problems to display.      Resolved Hospital Problems    Diagnosis Date Resolved POA   • Pneumonia of right lower lobe due to infectious organism [J18.9] 01/10/2022 Yes         Hospital Course  Patient is a 81 y.o. female presented with issues as noted above.  She was admitted treated for all issues and improved back to around her baseline.  There was some issues with due to the laying discharge for getting the patient back to her SNF, however eventually she was approved to go back and she  will be transferred there to continue care while there.        Procedures Performed:as noted above         Consults:   Consults     Date and Time Order Name Status Description    1/7/2022 11:26 AM Hematology & Oncology Inpatient Consult Completed           Pertinent Test Results:     Results from last 7 days   Lab Units 01/08/22  0801 01/07/22  0800 01/06/22  0705   WBC 10*3/mm3 5.04 1.58* 1.68*   HEMOGLOBIN g/dL 9.4* 9.3* 9.1*   HEMATOCRIT % 30.8* 30.4* 29.8*   PLATELETS 10*3/mm3 91* 105* 100*     Results from last 7 days   Lab Units 01/07/22  0800 01/06/22  0705 01/05/22  0549   SODIUM mmol/L 138 133* 137   POTASSIUM mmol/L 4.1 4.1 3.8   CHLORIDE mmol/L 107 104 106   CO2 mmol/L 22.3 24.2 23.6   BUN mg/dL 10 13 15   CREATININE mg/dL 1.27* 1.35* 1.36*   CALCIUM mg/dL 7.9* 7.8* 7.3*   GLUCOSE mg/dL 148* 171* 44*       Microbiology Results (last 10 days)     Procedure Component Value - Date/Time    Respiratory Panel PCR w/COVID-19(SARS-CoV-2) DYLAN/MORRIS/SATYA/PAD/COR/MAD/MICKIE In-House, NP Swab in UTM/VTM, 3-4 HR TAT - Swab, Nasopharynx [[id number]]  (Abnormal) Collected: 01/06/22 1302    Lab Status: Final result Specimen: Swab from Nasopharynx Updated: 01/07/22 1724     ADENOVIRUS, PCR Not Detected     Coronavirus 229E Not Detected     Coronavirus HKU1 Not Detected     Coronavirus NL63 Not Detected     Coronavirus OC43 Not Detected     COVID19 Not Detected     Human Metapneumovirus Not Detected     Human Rhinovirus/Enterovirus Not Detected     Influenza A PCR Not Detected     Influenza B PCR Not Detected     Parainfluenza Virus 1 Not Detected     Parainfluenza Virus 2 Not Detected     Parainfluenza Virus 3 Not Detected     Parainfluenza Virus 4 Not Detected     RSV, PCR Detected     Bordetella pertussis pcr Not Detected     Bordetella parapertussis PCR Not Detected     Chlamydophila pneumoniae PCR Not Detected     Mycoplasma pneumo by PCR Not Detected    Narrative:      In the setting of a positive respiratory panel with a  viral infection PLUS a negative procalcitonin without other underlying concern for bacterial infection, consider observing off antibiotics or discontinuation of antibiotics and continue supportive care. If the respiratory panel is positive for atypical bacterial infection (Bordetella pertussis, Chlamydophila pneumoniae, or Mycoplasma pneumoniae), consider antibiotic de-escalation to target atypical bacterial infection.    Blood Culture - Blood, Arm, Left [[id number]]  (Abnormal) Collected: 01/03/22 0610    Lab Status: Final result Specimen: Blood from Arm, Left Updated: 01/05/22 1039     Blood Culture Staphylococcus, coagulase negative     Isolated from Aerobic Bottle     Gram Stain Aerobic Bottle Gram positive cocci in clusters    Narrative:      Probable contaminant requires clinical correlation, susceptibility not performed unless requested by physician.      Blood Culture - Blood, Arm, Left [[id number]]  (Normal) Collected: 01/03/22 0610    Lab Status: Final result Specimen: Blood from Arm, Left Updated: 01/08/22 0700     Blood Culture No growth at 5 days    COVID PRE-OP / PRE-PROCEDURE SCREENING ORDER (NO ISOLATION) - Swab, Nasopharynx [[id number]]  (Normal) Collected: 01/03/22 0610    Lab Status: Final result Specimen: Swab from Nasopharynx Updated: 01/03/22 0639    Narrative:      The following orders were created for panel order COVID PRE-OP / PRE-PROCEDURE SCREENING ORDER (NO ISOLATION) - Swab, Nasopharynx.  Procedure                               Abnormality         Status                     ---------                               -----------         ------                     COVID-19 and FLU A/B PCR...[[id number]]  Normal              Final result                 Please view results for these tests on the individual orders.    COVID-19 and FLU A/B PCR - Swab, Nasopharynx [[id number]]  (Normal) Collected: 01/03/22 0610    Lab Status: Final result Specimen: Swab from Nasopharynx Updated: 01/03/22 0639      COVID19 Not Detected     Influenza A PCR Not Detected     Influenza B PCR Not Detected    Narrative:      Fact sheet for providers: https://www.fda.gov/media/045260/download    Fact sheet for patients: https://www.fda.gov/media/503472/download    Test performed by PCR.    Blood Culture ID, PCR - Blood, Arm, Left [[id number]]  (Abnormal) Collected: 01/03/22 0610    Lab Status: Final result Specimen: Blood from Arm, Left Updated: 01/04/22 1502     BCID, PCR Staph spp, not aureus or lugdunesis. Identification by BCID2 PCR.     BOTTLE TYPE Aerobic Bottle    Urine Culture - Urine, Urine, Clean Catch [[id number]]  (Abnormal)  (Susceptibility) Collected: 01/03/22 0505    Lab Status: Final result Specimen: Urine, Clean Catch Updated: 01/06/22 1311     Urine Culture >100,000 CFU/mL Escherichia coli      <25,000 CFU/mL Mixed Kedar Isolated    Narrative:      Specimen contains mixed organisms of questionable pathogenicity which indicates contamination with commensal kedar.  Further identification is unlikely to provide clinically useful information.  Suggest recollection.    Susceptibility      Escherichia coli     NEEMA     Ampicillin Susceptible     Ampicillin + Sulbactam Susceptible     Cefazolin Susceptible     Cefepime Susceptible     Ceftazidime Susceptible     Ceftriaxone Susceptible     Gentamicin Susceptible     Levofloxacin Susceptible     Nitrofurantoin Susceptible     Piperacillin + Tazobactam Susceptible     Trimethoprim + Sulfamethoxazole Susceptible                         S. Pneumo Ag Urine or CSF - Urine, Urine, Clean Catch [[id number]]  (Normal) Collected: 01/03/22 0505    Lab Status: Final result Specimen: Urine, Clean Catch Updated: 01/03/22 0653     Strep Pneumo Ag Negative    Legionella Antigen, Urine - Urine, Urine, Clean Catch [[id number]]  (Normal) Collected: 01/03/22 0505    Lab Status: Final result Specimen: Urine, Clean Catch Updated: 01/03/22 0653     LEGIONELLA ANTIGEN, URINE Negative          Results  for orders placed during the hospital encounter of 08/06/21    Adult Transthoracic Echo Complete W/ Cont if Necessary Per Protocol    Interpretation Summary  · Left ventricular wall thickness is consistent with mild to moderate septal asymmetric hypertrophy.  · Calculated left ventricular EF = 63.8% Estimated left ventricular EF was in agreement with the calculated left ventricular EF. Left ventricular systolic function is normal.  · Mildly reduced right ventricular systolic function noted.      Imaging Results (All)     Procedure Component Value Units Date/Time    CT Abdomen Pelvis With Contrast [[id number]] Collected: 01/03/22 0539     Updated: 01/03/22 0541    Narrative:      CT Abdomen Pelvis W    INDICATION:   Left-sided abdominal pain with nausea and vomiting today    TECHNIQUE:   CT of the abdomen and pelvis with IV contrast. Coronal and sagittal reconstructions were obtained.  Radiation dose reduction techniques included automated exposure control or exposure modulation based on body size. Count of known CT and cardiac nuc med  studies performed in previous 12 months: 3.     COMPARISON:   7/29/2020    FINDINGS:  Abdomen: Small right greater than left effusions. There is patchy dense infiltrate in the right lower lobe with some minimal posterior atelectasis. Gallbladder is been removed. Liver, spleen, pancreas, adrenal glands and kidneys are normal in appearance.  Aorta is normal in size. There is no adenopathy. Bowel is normal.    Pelvis: Bladder shows mild bladder wall thickening. Prostate gland is normal. Bones are unremarkable. There are postoperative changes in lumbar spine.      Impression:      Small right greater than left effusions with right base atelectasis and infiltrate    No acute findings in the abdomen and pelvis          Signer Name: Henry Mendiola MD   Signed: 1/3/2022 5:39 AM   Workstation Name: [facility]    [facility]    XR Chest 2 View [[id number]] Collected: 01/03/22  0537     Updated: 01/03/22 0539    Narrative:      CR Chest 2 Vws    INDICATION:    Wheezing today    COMPARISON:    8/26/2021    FINDINGS:   PA and lateral views of the chest.  Heart size normal. There is a venous catheter in the left arm. The tip is near the axilla. Left lungs clear. Right lung shows patchy density right lower lobe        Impression:      There are patchy densities in the right lower lobe consistent with infiltrates. Left lung is clear.    Signer Name: Henry Mendiola MD   Signed: 1/3/2022 5:37 AM   Workstation Name: [employer]    [facility]            Condition on Discharge:  Chronically ill     Vital Signs  Temp:  [96.9 °F (36.1 °C)-99.1 °F (37.3 °C)] 97.7 °F (36.5 °C)  Heart Rate:  [80-88] 87  Resp:  [18-20] 20  BP: ()/(50-68) 119/68    Physical Exam:  Physical Exam  Vitals reviewed.   Cardiovascular:      Rate and Rhythm: Normal rate.   Pulmonary:      Effort: Pulmonary effort is normal. No respiratory distress.   Abdominal:      Palpations: Abdomen is soft.   Skin:     General: Skin is warm.   Neurological:      Mental Status: She is alert. Mental status is at baseline.   Psychiatric:         Mood and Affect: Mood normal.         Discharge Disposition  Skilled Nursing Facility (DC - External)    Discharge Medications     Discharge Medications      New Medications      Instructions Start Date   cefdinir 300 MG capsule  Commonly known as: OMNICEF   300 mg, Oral, Every 12 Hours Scheduled      guaiFENesin-dextromethorphan 100-10 MG/5ML syrup  Commonly known as: ROBITUSSIN DM   5 mL, Oral, Every 4 Hours PRN         Changes to Medications      Instructions Start Date   atorvastatin 10 MG tablet  Commonly known as: LIPITOR  What changed:   · how to take this  · when to take this   TAKE ONE TABLET BY MOUTH AT BEDTIME      benzonatate 100 MG capsule  Commonly known as: Tessalon Perles  What changed: when to take this   100 mg, Oral, 3 Times Daily PRN      insulin aspart 100  UNIT/ML injection  Commonly known as: novoLOG  What changed:   · how much to take  · when to take this  · reasons to take this  · additional instructions   5 Units, Subcutaneous, 3 Times Daily With Meals      NovoLOG FlexPen 100 UNIT/ML solution pen-injector sc pen  Generic drug: insulin aspart  What changed: Another medication with the same name was changed. Make sure you understand how and when to take each.   3 Times Daily With Meals      polyethylene glycol 17 g packet  Commonly known as: MIRALAX  What changed:   · when to take this  · reasons to take this   17 g, Oral, Daily      sennosides-docusate 8.6-50 MG per tablet  Commonly known as: PERICOLACE  What changed: when to take this   2 tablets, Oral, Daily      tamsulosin 0.4 MG capsule 24 hr capsule  Commonly known as: FLOMAX  What changed: when to take this   0.4 mg, Oral, Daily      Ventolin  (90 Base) MCG/ACT inhaler  Generic drug: albuterol sulfate HFA  What changed: how to take this   INHALE TWO PUFFS BY MOUTH EVERY 4 HOURS AS NEEDED FOR WHEEZING         Continue These Medications      Instructions Start Date   Accu-Chek FastClix Lancets misc   TEST 3-4 TIMES DAILY AS DIRECTED      Accu-Chek Susu SmartView w/Device kit   TEST blood sugar three times daily or as directed      Accu-Chek SmartView test strip  Generic drug: glucose blood   TEST BLOOD SUGAR THREE TIMES DAILY OR AS DIRECTED      acetaminophen 325 MG tablet  Commonly known as: TYLENOL   650 mg, Oral, Every 6 Hours PRN, OTC product      amiodarone 200 MG tablet  Commonly known as: PACERONE   200 mg, Oral, Daily      apixaban 5 MG tablet tablet  Commonly known as: Eliquis   5 mg, Oral, 2 Times Daily      bisacodyl 5 MG EC tablet  Commonly known as: DULCOLAX   5 mg, Oral, Daily PRN      bisacodyl 10 MG suppository  Commonly known as: DULCOLAX   10 mg, Rectal, Daily PRN      desvenlafaxine 50 MG 24 hr tablet  Commonly known as: PRISTIQ   TAKE ONE TABLET BY MOUTH EVERY DAY      Diclofenac  "Sodium 1 % gel gel  Commonly known as: VOLTAREN   2 g, Topical, Every 6 Hours PRN, Apply 2g to Left shoulder topically every 6 hours as needed for pain.      gabapentin 100 MG capsule  Commonly known as: NEURONTIN   100 mg, Oral, 3 Times Daily      Glucerna 1.0 Khai/CarbSteady liquid   240 mL, Oral, Daily      HYDROcodone-acetaminophen 5-325 MG per tablet  Commonly known as: NORCO   1 tablet, Oral, 2 Times Daily PRN      insulin detemir 100 UNIT/ML injection  Commonly known as: LEVEMIR   10 Units, Subcutaneous, Nightly      levothyroxine 88 MCG tablet  Commonly known as: SYNTHROID, LEVOTHROID   TAKE ONE TABLET BY MOUTH EVERY DAY      melatonin 5 MG tablet tablet   5 mg, Oral, Nightly PRN      midodrine 2.5 MG tablet  Commonly known as: PROAMATINE   2.5 mg, Oral, 3 Times Daily      Nebulizer device   1 each, Does not apply, Take As Directed      Needle (Disp) 30G X 1/2\" misc  Commonly known as: BD Disp Needles   To be used 3 times daily with Novolog Flexpen.      nystatin 987743 UNIT/GM cream  Commonly known as: MYCOSTATIN   1 application, Topical, As Needed      O2  Commonly known as: OXYGEN   2 L/min (2 L/min), Inhalation, Continuous, 1.5 L continuous with 2 L nightly      omeprazole 40 MG capsule  Commonly known as: priLOSEC   40 mg, Oral, 2 times daily      promethazine 25 MG tablet  Commonly known as: PHENERGAN   25 mg, Oral, Every 6 Hours PRN      Phenergan 25 MG/ML injection  Generic drug: promethazine   25 mg, Intramuscular, Every 6 Hours PRN      Retacrit 71101 UNIT/ML injection  Generic drug: epoetin chu-epbx   60,000 Units, Subcutaneous, Inject 1.5 mL subcutaneously every day shift every Friday.      UltiCare Mini Pen Needles 31G X 6 MM misc  Generic drug: Insulin Pen Needle   USE TO inject insulins FOUR TIMES DAILY AS DIRECTED         Stop These Medications    metaxalone 400 MG tablet  Commonly known as: SKELAXIN            Discharge Diet:   Diet Instructions     Diet: Soft Texture; Thin Liquids, No " Restrictions; Whole      Discharge Diet: Soft Texture    Fluid Consistency: Thin Liquids, No Restrictions    Soft Options: Whole          Activity at Discharge:   Activity Instructions     Gradually Increase Activity Until at Pre-Hospitalization Level            Follow-up Appointments  Future Appointments   Date Time Provider Department Center   1/27/2022  7:00 AM [facility] NM 2(FORTE)  [facility]   2/22/2022  2:30 PM Kehinde Sanon MD [facility]   2/24/2022  8:30 AM Emmanuel Rogers MD [facility]     Additional Instructions for the Follow-ups that You Need to Schedule     Discharge Follow-up with PCP   As directed       Currently Documented PCP:    Marcy Goncalves APRN    PCP Phone Number:    [phone]     Follow Up Details: one week         Discharge Follow-up with Specified Provider: hematology; 2 Weeks   As directed      To: hematology    Follow Up: 2 Weeks               Test Results Pending at Discharge       Risk for Readmission (LACE) Score: 18 (1/10/2022  6:01 AM)      This patient has been examined wearing appropriate Personal Protective Equipment. 01/10/22      Time: Discharge 36 min with face-to-face history exam, writing of prescriptions, and documenting discharge data including care coordination with the nursing staff.      Electronically signed by Maury Denton DO, 01/10/22, 11:24 EST.

## 2022-01-11 NOTE — CASE MANAGEMENT/SOCIAL WORK
Case Management Discharge Note      Final Note: Dischared to [facility], intermediate care    Provided Post Acute Provider List?: N/A  N/A Provider List Comment: Resident at [facility]  Provided Post Acute Provider Quality & Resource List?: N/A  N/A Quality & Resource List Comment: Resident at [facility]    Selected Continued Care - Discharged on 1/10/2022 Admission date: 1/3/2022 - Discharge disposition: Skilled Nursing Facility (DC - External)    Destination Coordination complete.    Service Provider Selected Services Address Phone Fax Patient Preferred    [facility]  Intermediate Care [address] [facility] [address] [phone] --          Durable Medical Equipment    No services have been selected for the patient.              Dialysis/Infusion    No services have been selected for the patient.              Home Medical Care    No services have been selected for the patient.              Therapy    No services have been selected for the patient.              Community Resources    No services have been selected for the patient.              Community & DME    No services have been selected for the patient.                       Final Discharge Disposition Code: 04 - intermediate care facility

## 2022-01-18 NOTE — PROGRESS NOTES
Received outside lab results from [facility]. Per Dr. Headley, orders placed for transfusion of 2 units PRBC with repeat labs including flow. Message routed to scheduling to arrange for transfusion in [facility] [place].

## 2022-01-20 NOTE — NURSING NOTE
Patient arrived to [facility] at 0820 per EMS for scheduled blood transfusion.  Assisted to bed.  IV started and labs drawn for T&S.  History and medications provided from [facility].  Patient received 2 units PRBC's without incident.  Critical labs called CBC office speaking to Nurse.  Transfusion completed at 1540.  Patient incontinent of urine and soft stool.. Cleaned and adult incontinence diaper applied.  Transported via EMS back to [facility] at 1610. ANISH Kathleen

## 2022-01-20 NOTE — PATIENT INSTRUCTIONS
"  Call Dr. Elieser Headley, [facility] at [phone] if you have any problems or concerns.        We know you have a Choice in healthcare and appreciate you using [facility].  Our purpose is to provide you \"Excellent Care\".  We hope that you will always choose us in the future and continue to recommend us to your family and friends.    Blood Transfusion, Adult, Care After  This sheet gives you information about how to care for yourself after your procedure. Your doctor may also give you more specific instructions. If you have problems or questions, contact your doctor.  What can I expect after the procedure?  After the procedure, it is common to have:  · Bruising and soreness at the IV site.  · A fever or chills on the day of the procedure. This may be your body's response to the new blood cells received.  · A headache.  Follow these instructions at home:  Insertion site care         · Follow instructions from your doctor about how to take care of your insertion site. This is where an IV tube was put into your vein. Make sure you:  ? Wash your hands with soap and water before and after you change your bandage (dressing). If you cannot use soap and water, use hand .  ? Change your bandage as told by your doctor.  · Check your insertion site every day for signs of infection. Check for:  ? Redness, swelling, or pain.  ? Bleeding from the site.  ? Warmth.  ? Pus or a bad smell.  General instructions  · Take over-the-counter and prescription medicines only as told by your doctor.  · Rest as told by your doctor.  · Go back to your normal activities as told by your doctor.  · Keep all follow-up visits as told by your doctor. This is important.  Contact a doctor if:  · You have itching or red, swollen areas of skin (hives).  · You feel worried or nervous (anxious).  · You feel weak after doing your normal activities.  · You have redness, swelling, warmth, or pain around the insertion site.  · You have " blood coming from the insertion site, and the blood does not stop with pressure.  · You have pus or a bad smell coming from the insertion site.  Get help right away if:  · You have signs of a serious reaction. This may be coming from an allergy or the body's defense system (immune system). Signs include:  ? Trouble breathing or shortness of breath.  ? Swelling of the face or feeling warm (flushed).  ? Fever or chills.  ? Head, chest, or back pain.  ? Dark pee (urine) or blood in the pee.  ? Widespread rash.  ? Fast heartbeat.  ? Feeling dizzy or light-headed.  You may receive your blood transfusion in an outpatient setting. If so, you will be told whom to contact to report any reactions.  These symptoms may be an emergency. Do not wait to see if the symptoms will go away. Get medical help right away. Call your local emergency services (911 in the [place] Do not drive yourself to the hospital.  Summary  · Bruising and soreness at the IV site are common.  · Check your insertion site every day for signs of infection.  · Rest as told by your doctor. Go back to your normal activities as told by your doctor.  · Get help right away if you have signs of a serious reaction.  This information is not intended to replace advice given to you by your health care provider. Make sure you discuss any questions you have with your health care provider.  Document Revised: 06/11/2020 Document Reviewed: 06/11/2020  [employer] Patient Education © 2021 [employer]

## 2022-01-20 NOTE — NURSING NOTE
NURSING PROGRESS NOTE      Pt to [facility] per  scheduled appointment.  Pt ID verified by (2) identifiers. Allergies, medications and medical history reviewed and verified.Labs drawn, type and screen for blood. Consent signed for blood transfusion .  Tolerated prescribed treatment without incident. Patient received AVS, Pt verbalized understanding.  Discharged to EMS , transported to Nursing home @  Condition Satisfactory .  Kezia Benítez RN

## 2022-01-26 NOTE — PROGRESS NOTES
Subjective     REASON FOR FOLLOW-UP:   1.  Macrocytic anemia secondary to myelodysplastic syndrome with 5 q. minus and chronic kidney disease  2.  Monoclonal gammopathy of undetermined significance                               Ms. Singh is a nikita 81-year-old woman returning today for follow-up of her myelodysplastic syndrome.  The patient was previously on Procrit therapy but has had recent hospital admissions for nausea/vomiting and Covid.  She now resides in a nursing center and has been relying primarily on transfusion support of packed red blood cells.  Her recent CBCs have shown significant decline in the white blood cell and platelet counts in addition to her anemia.  She has been on Eliquis anticoagulation but denies excessive bleeding or bruising.        Hematology History:  Patient presented as 78-year-old woman for evaluation of anemia.  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  She has stage III chronic kidney disease followed by Dr. Garza of nephrology.  Reviewing her records, the patient has had anemia present since at least 2014 but her hemoglobin has worsened over the past 6 months.  The patient was admitted to the hospital in October 2018 with symptomatic anemia, shortness of breath and lightheadedness.  She was found to have hemoglobin of 7.0.  There was concern for GI blood loss although EGD and colonoscopy performed showed no obvious etiology of blood loss.   She was transfused 7 units of packed red blood cells during the hospital stay.  I do not see any Hemoccult results.  She was previously on Eliquis which was discontinued.  Since discharge in October, the patient has been receiving weekly Procrit 20,000 units in the [facility] at [place], but despite Procrit she has required transfusion on 2 separate occasions.  She is not seeing any bright red blood per rectum or melena.  She complains of fatigue and  lightheadedness.    Recent iron profile on 1/17/19 was inconsistent with iron deficiency with an iron saturation 68% and the ferritin was 352.  The red blood cells are macrocytic.  White blood cell and platelet counts have been normal.    The patient was seen in hematology on 1/29/19 and additional evaluation performed.  The reticulocyte count was elevated 3.72% but the haptoglobin and LDH were both normal arguing against a hemolytic process.  B12 and folic acid levels were normal.  Serum protein electrophoresis showed no M spike but the immunofixation identified a small IgA monoclonal protein with lambda specificity; IgA level 544.  Sedimentation rate elevated 58.  A free light chain ratio from the serum was normal 1.64.    The patient was referred for a bone marrow exam performed on 3/6/2019 which showed a cellularity of 35%.  There was erythroid hyperplasia with megaloblastoid changes, normal myeloid maturation, increased megakaryocytes with frequent micro megakaryocytes, scattered lymphoid aggregates.  There were morphologically normal plasma cells increased in #2 8% of the cellularity.  There were no increased blasts.  No increase in iron stores.  Karyotype showed a deletion 5 q. and 19 of 20 cells analyzed.    Patient was initiated on Procrit therapy with continued Red cell transfusion requirements despite maximum dose Procrit.  She attempted to take Revlimid on 2 occasions but developed rash despite dose reductions of Revlimid.    Combination Procrit plus Neupogen initiated 11/11/2020.    Past Medical History:   Diagnosis Date   • Allergic rhinitis    • Anemia    • Anxiety    • Appetite absent    • Arthritis    • Asthma    • Back pain    • Bell's palsy    • Black tarry stools    • Blood in stool    • Chronic fatigue    • CKD (chronic kidney disease) stage 3, GFR 30-59 ml/min ([facility])    • Community acquired pneumonia of left lung 10/25/2018   • Cough    • Depression    • Diabetes mellitus ([facility])     LAST A1C 6    • Diabetic gastroparesis (HCC) 2/19/2016   • Difficulty walking    • Excessive urination at night    • Frequent urination    • GERD (gastroesophageal reflux disease)    • GI bleed    • Gout    • H/O blood clots     LEFT LEG 7 OR 8 YEARS AGO   • Heat intolerance    • History of fall 10/2018   • History of prior pregnancies     x8, miscarriage 5   • History of transfusion 11/2018    due to anemia   • Hyperlipidemia    • Hypertension    • Hypothyroidism    • Myelodysplastic syndrome with isolated del(5q) chromosomal abnormality ([facility])    • Normal coronary arteries     by cath 2013   • Orthostatic hypotension    • AARON (obstructive sleep apnea)     DOESNT WEAR REGULARLY   • Paraplegic immobility syndrome    • Pneumonia    • PONV (postoperative nausea and vomiting)    • Skin cancer    • Stroke (HCC)     Several mini-strokes   • TIA (transient ischemic attack)     LAST TIA JULY 2017   • Urination pain    • UTI (urinary tract infection)     Dec 2018 and Jan 2019        Past Surgical History:   Procedure Laterality Date   • BACK SURGERY      HARDWARE   • CHOLECYSTECTOMY      OPEN   • COLONOSCOPY  2011    due for repeat in 2021   • COLONOSCOPY N/A 10/28/2018    Procedure: COLONOSCOPY;  Surgeon: Emmanuel Rogers MD;  Location: [facility] OR;  Service: Gastroenterology   • ENDOSCOPY N/A 10/26/2018    Procedure: ESOPHAGOGASTRODUODENOSCOPY;  Surgeon: Emmanuel Rogers MD;  Location: [facility] OR;  Service: Gastroenterology   • ENDOSCOPY N/A 5/13/2021    Procedure: ESOPHAGOGASTRODUODENOSCOPY, biopsy, dilatation;  Surgeon: Emmanuel Rogers MD;  Location: [facility] OR;  Service: Gastroenterology;  Laterality: N/A;  Esophagitis; Dilation- no stricture; Biopsy- esophagus   • HYSTERECTOMY      PARTIAL    • KNEE SURGERY     • NECK SURGERY     • SPINE SURGERY     • TUMOR REMOVAL Left     Leg   • UPPER GASTROINTESTINAL ENDOSCOPY  2014    gastritis.  done by dr. hastings        Current Outpatient Medications on File  Prior to Visit   Medication Sig Dispense Refill   • ACCU-CHEK FASTCLIX LANCETS misc TEST 3-4 TIMES DAILY AS DIRECTED 400 each 3   • ACCU-CHEK SMARTVIEW test strip TEST BLOOD SUGAR THREE TIMES DAILY OR AS DIRECTED 300 each 3   • acetaminophen (TYLENOL) 325 MG tablet Take 2 tablets by mouth Every 6 (Six) Hours As Needed for Mild Pain . OTC product 40 tablet 0   • amiodarone (PACERONE) 200 MG tablet Take 200 mg by mouth Daily.     • apixaban (Eliquis) 5 MG tablet tablet Take 1 tablet by mouth 2 (Two) Times a Day. 180 tablet 1   • atorvastatin (LIPITOR) 10 MG tablet TAKE ONE TABLET BY MOUTH AT BEDTIME (Patient taking differently: 10 mg.) 90 tablet 1   • benzonatate (Tessalon Perles) 100 MG capsule Take 1 capsule by mouth 3 (Three) Times a Day As Needed for Cough. (Patient taking differently: Take 100 mg by mouth Every 8 (Eight) Hours As Needed for Cough.)     • bisacodyl (DULCOLAX) 10 MG suppository Insert 1 suppository into the rectum Daily As Needed for Constipation (Use if bisacodyl oral is ineffective).     • bisacodyl (DULCOLAX) 5 MG EC tablet Take 5 mg by mouth Daily As Needed for Constipation.     • Blood Glucose Monitoring Suppl (ACCU-CHEK KENNETH SMARTVIEW) w/Device kit TEST blood sugar three times daily or as directed 1 kit 0   • desvenlafaxine (PRISTIQ) 50 MG 24 hr tablet TAKE ONE TABLET BY MOUTH EVERY DAY (Patient taking differently: Take 50 mg by mouth Daily.) 90 tablet 1   • Diclofenac Sodium (VOLTAREN) 1 % gel gel Apply 2 g topically to the appropriate area as directed Every 6 (Six) Hours As Needed. Apply 2g to Left shoulder topically every 6 hours as needed for pain.     • epoetin chu-epbx (Retacrit) 26276 UNIT/ML injection Inject 60,000 Units under the skin into the appropriate area as directed. Inject 1.5 mL subcutaneously every day shift every Friday.     • gabapentin (NEURONTIN) 100 MG capsule Take 100 mg by mouth 3 (Three) Times a Day.     • guaiFENesin-dextromethorphan (ROBITUSSIN DM) 100-10 MG/5ML  "syrup Take 5 mL by mouth Every 4 (Four) Hours As Needed for Cough.     • HYDROcodone-acetaminophen (NORCO) 5-325 MG per tablet Take 1 tablet by mouth 2 (Two) Times a Day As Needed.     • insulin aspart (novoLOG) 100 UNIT/ML injection Inject 5 Units under the skin into the appropriate area as directed 3 (Three) Times a Day With Meals. (Patient taking differently: Inject  under the skin into the appropriate area as directed 3 (Three) Times a Day As Needed. Per sliding scale)  12   • insulin detemir (LEVEMIR) 100 UNIT/ML injection Inject 10 Units under the skin into the appropriate area as directed Every Night.     • levothyroxine (SYNTHROID, LEVOTHROID) 88 MCG tablet TAKE ONE TABLET BY MOUTH EVERY DAY (Patient taking differently: Take 88 mcg by mouth Daily.) 90 tablet 1   • melatonin 5 MG tablet tablet Take 1 tablet by mouth At Night As Needed (Insomnia).     • midodrine (PROAMATINE) 2.5 MG tablet Take 1 tablet by mouth 3 (Three) Times a Day. 90 tablet 6   • Nebulizer device 1 each Take As Directed. 1 each 0   • Needle, Disp, (BD DISP NEEDLES) 30G X 1/2\" misc To be used 3 times daily with Novolog Flexpen. 100 each 5   • NovoLOG FlexPen 100 UNIT/ML solution pen-injector sc pen 3 (Three) Times a Day With Meals.     • Nutritional Supplements (Glucerna 1.0 Khai/CarbSteady) liquid Take 240 mL by mouth Daily. 7200 mL 11   • nystatin (MYCOSTATIN) 730222 UNIT/GM cream Apply 1 application topically to the appropriate area as directed As Needed.     • O2 (OXYGEN) Inhale 2 L/min Continuous. 1.5 L continuous with 2 L nightly     • omeprazole (priLOSEC) 40 MG capsule Take 1 capsule by mouth 2 (two) times a day. 180 capsule 3   • polyethylene glycol (MIRALAX) 17 g packet Take 17 g by mouth Daily. (Patient taking differently: Take 17 g by mouth 2 (Two) Times a Day As Needed.)     • promethazine (PHENERGAN) 25 MG tablet Take 25 mg by mouth Every 6 (Six) Hours As Needed for Nausea or Vomiting.     • promethazine (Phenergan) 25 MG/ML " injection Inject 25 mg into the appropriate muscle as directed by prescriber Every 6 (Six) Hours As Needed for Nausea or Vomiting.     • saccharomyces boulardii (FLORASTOR) 250 MG capsule Take 250 mg by mouth 2 (Two) Times a Day.     • sennosides-docusate (senna-docusate sodium) 8.6-50 MG per tablet Take 2 tablets by mouth Daily. (Patient taking differently: Take 2 tablets by mouth Every Night.) 60 tablet 3   • tamsulosin (FLOMAX) 0.4 MG capsule 24 hr capsule Take 1 capsule by mouth Daily. (Patient taking differently: Take 0.4 mg by mouth Every Night.) 30 capsule    • UltiCare Mini Pen Needles 31G X 6 MM misc USE TO inject insulins FOUR TIMES DAILY AS DIRECTED 400 each 3   • Ventolin  (90 Base) MCG/ACT inhaler INHALE TWO PUFFS BY MOUTH EVERY 4 HOURS AS NEEDED FOR WHEEZING (Patient taking differently: Inhale 2 puffs Every 4 (Four) Hours As Needed for Wheezing.) 18 g 3     Current Facility-Administered Medications on File Prior to Visit   Medication Dose Route Frequency Provider Last Rate Last Admin   • acetaminophen (TYLENOL) tablet 650 mg  650 mg Oral Q6H PRN Elieser Headley MD       • diphenhydrAMINE (BENADRYL) capsule 25 mg  25 mg Oral Once Elieser Headley MD            ALLERGIES:    Allergies   Allergen Reactions   • Baclofen Anxiety     Panic attack, nightmares   • Codeine Itching and Rash   • Lisinopril Cough   • Morphine Hives   • Penicillins Rash     Tolerates cephalosporins         Social History     Socioeconomic History   • Marital status:    • Number of children: 3   • Years of education: High School   Tobacco Use   • Smoking status: Never Smoker   • Smokeless tobacco: Never Used   • Tobacco comment: CAFFEINE USE: NONE   Vaping Use   • Vaping Use: Never used   Substance and Sexual Activity   • Alcohol use: No   • Drug use: No   • Sexual activity: Defer     Comment: EXERCISE - RARELY        Family History   Problem Relation Age of Onset   • Lupus Mother    • Heart failure Mother 59   •  Heart disease Other    • Hypertension Other    • Heart attack Father    • Breast cancer Neg Hx         Review of Systems   Constitutional: Positive for fatigue (improved) and unexpected weight change. Negative for appetite change and fever.   HENT: Negative for congestion.    Respiratory: Positive for shortness of breath (with exertion). Negative for apnea and cough.    Gastrointestinal: Positive for nausea and vomiting. Negative for abdominal pain and blood in stool.   Genitourinary: Negative for dysuria, frequency and urgency.   Musculoskeletal: Positive for arthralgias (stable), back pain (stable) and gait problem (stable). Negative for neck stiffness.   Skin: Negative for rash.   Neurological: Positive for numbness. Negative for dizziness, tremors, speech difficulty and light-headedness.   Hematological: Negative.    Psychiatric/Behavioral: Negative.        Objective     Vitals:    01/26/22 0945   BP: 116/64   Pulse: 80   Resp: 18   Temp: 97.3 °F (36.3 °C)   TempSrc: Infrared   SpO2: 100%   Weight: 73 kg (161 lb)  Comment: pt stated   PainSc: 10-Worst pain ever   PainLoc: Generalized  Comment: pain all over     Current Status 1/26/2022   ECOG score 3       Physical Exam    CON: pleasant well-developed somewhat chronic ill appearing woman, she is wearing a facemask.  She is seated in a wheelchair.  She is in no acute distress.  HEENT: no icterus, no thrush, moist membranes  NECK: no jvd  LYMPH: No cervical  lymphadenopathy  CV: RRR, S1S2, no murmur  RESP: Lungs clear to auscultation bilaterally, no wheezing noted.  MUSC: No edema noted.  Poor mobility, and a wheelchair  NEURO: alert and oriented x3, mild to moderate global weakness  PSYCH: normal mood and affect  Skin: No induration no petechiae.      RECENT LABS:  Hematology WBC   Date Value Ref Range Status   01/26/2022 1.70 (C) 3.40 - 10.80 10*3/mm3 Final   01/12/2021 5.5 3.4 - 10.8 x10E3/uL Final     RBC   Date Value Ref Range Status   01/26/2022 4.07 3.77 -  5.28 10*6/mm3 Final   01/12/2021 3.14 (L) 3.77 - 5.28 x10E6/uL Final     Hemoglobin   Date Value Ref Range Status   01/26/2022 11.2 (L) 12.0 - 15.9 g/dL Final     Hematocrit   Date Value Ref Range Status   01/26/2022 35.0 34.0 - 46.6 % Final     Platelets   Date Value Ref Range Status   01/26/2022 36 (C) 140 - 450 10*3/mm3 Final        Lab Results   Component Value Date    GLUCOSE 148 (H) 01/07/2022    BUN 10 01/07/2022    CREATININE 1.27 (H) 01/07/2022    EGFRIFNONA 41 (L) 01/07/2022    EGFRIFAFRI  08/27/2021      Comment:      <15 Indicative of kidney failure.    BCR 7.9 01/07/2022    K 4.1 01/07/2022    CO2 22.3 01/07/2022    CALCIUM 7.9 (L) 01/07/2022    PROTENTOTREF 6.3 06/02/2021    ALBUMIN 2.60 (L) 01/03/2022    LABIL2 1.2 06/02/2021    AST 12 01/03/2022    ALT 8 01/03/2022     Flow cytometry 1/20/2022 showed a low level myeloblast population    Assessment/Plan     1. myelodysplastic syndrome with 5 q. 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  In addition, the patient has chronic kidney disease, stage III contributing to anemia    The patient has recent worsening of her white blood cell and platelet counts.  Peripheral blood flow cytometry shows a low level myeloblast population.  Findings are concerning for progression to acute myeloid leukemia.  I discussed this possibility with the patient.  She would like to have a bone marrow examination to further evaluate and for prognostic information.  We will get this scheduled and I will see the patient back to discuss results.  If she has acute myeloid leukemia given her age and performance status she likely is not a candidate for active treatment.    2.  Monoclonal gammopathy of undetermined significance   I    3.  Chronic kidney disease, stage III which contributes to anemia.      4.  Transfusional iron overload     Hematology plan/recommendations:  1.  Bone marrow biopsy/aspiration under CT guidance  for morphology, cytogenetics and flow cytometry  2.  Discontinue Eliquis secondary to severe thrombocytopenia and bleeding risk  3  Continue weekly CBC at her nursing center with transfusion of packed red blood cells 2 units for hemoglobin less than 8.0 or platelets if platelet count less than 10,000 or active bleeding  4.  Follow-up 2 weeks after bone marrow examination for discussion of results.  I requested the patient to bring a family member to the appointment.

## 2022-01-26 NOTE — TELEPHONE ENCOUNTER
Spoke with pt's nurse re: her eliquis. They did hold yesterday's dose as instructed and I gave verbal order per Dr. Farfan to go ahead and discontinue this medication. Will fax orders for weekly CBC with results faxed to our office. Transfuse for hgb less than 8 and plt less than 10.

## 2022-02-01 PROBLEM — D64.9 ANEMIA: Status: ACTIVE | Noted: 2022-01-01

## 2022-02-03 PROBLEM — E43 SEVERE MALNUTRITION (HCC): Status: ACTIVE | Noted: 2022-01-01

## 2022-05-04 NOTE — PROGRESS NOTES
Date of Office Visit: 06/15/2021  Encounter Provider: Kehinde Sanon MD  Place of Service: [facility] CARDIOLOGY  Patient Name: Joya Singh  :1940    Chief Complaint   Patient presents with   • Congestive Heart Failure     follow up    • Hypertension   :     HPI: Joya Singh is a 81 y.o. female who presents today to follow up.  I have reviewed prior notes and there are no changes except for any new updates described below. I have also reviewed any information entered into the medical record by the patient or by ancillary staff.     She carried a reported history of CAD, but a cardiac cath was performed at [facility] in , and showed no significant intraluminal coronary atherosclerosis. There was extraluminal coronary artery calcification.  She has brittle diabetes, a history of stroke, and hypertension.     In , a Cardiolite showed a very small, extremely mild reversible defect in the distal anterior wall.  She was not having angina, and I felt this was an extremely low risk result. I recommended medical therapy.    In 2018 she was seen in our [facility] for myriad complaints, which were predominantly fatigue and lightheadedness.  It was noted that her medication list was extensive, that she had significant diabetic dysautonomia, and significant chronic anemia from MDS.  It was not felt that she had an acute cardiac issue.  An echo at that time was WNL for age.     She continued to have orthostatic symptoms and was ultimately  been placed on midodrine. She has been hospitalized for aspiration pneumonia since we saw her last. She has a lot of dysphagia. She has lost a lot of weight. She requires blood transfusions almost every other week.     She has chronic fatigue. She has chronic non-cardiac chest pain, which is a one-second long pricking feeling at rest. She has chronic fatigue, mild chronic dyspnea, and mild chronic pedal edema. She denies syncope.     Past  Medical History:   Diagnosis Date   • Allergic rhinitis    • Anemia    • Anxiety    • Appetite absent    • Arthritis    • Asthma    • Back pain    • Bell's palsy    • Black tarry stools    • Blood in stool    • Chronic fatigue    • CKD (chronic kidney disease) stage 3, GFR 30-59 ml/min (CMS/[facility])    • Community acquired pneumonia of left lung 10/25/2018   • Cough    • Depression    • Diabetes mellitus (CMS/[facility])     LAST A1C 6   • Diabetic gastroparesis (CMS/[facility]) 2/19/2016   • Difficulty walking    • Excessive urination at night    • Frequent urination    • GERD (gastroesophageal reflux disease)    • GI bleed    • Gout    • H/O blood clots     LEFT LEG 7 OR 8 YEARS AGO   • Heat intolerance    • History of fall 10/2018   • History of prior pregnancies     x8, miscarriage 5   • History of transfusion 11/2018    due to anemia   • Hyperlipidemia    • Hypertension    • Hypothyroidism    • Normal coronary arteries     by cath 2013   • Orthostatic hypotension    • AARON (obstructive sleep apnea)     DOESNT WEAR REGULARLY   • Pneumonia    • PONV (postoperative nausea and vomiting)    • Skin cancer    • Stroke (CMS/[facility])     Several mini-strokes   • TIA (transient ischemic attack)     LAST TIA JULY 2017   • Urination pain    • UTI (urinary tract infection)     Dec 2018 and Jan 2019       Past Surgical History:   Procedure Laterality Date   • BACK SURGERY      HARDWARE   • CHOLECYSTECTOMY      OPEN   • COLONOSCOPY  2011    due for repeat in 2021   • COLONOSCOPY N/A 10/28/2018    Procedure: COLONOSCOPY;  Surgeon: Emmanuel Rogers MD;  Location: [facility] OR;  Service: Gastroenterology   • ENDOSCOPY N/A 10/26/2018    Procedure: ESOPHAGOGASTRODUODENOSCOPY;  Surgeon: Emmanuel Rogers MD;  Location: [facility] OR;  Service: Gastroenterology   • ENDOSCOPY N/A 5/13/2021    Procedure: ESOPHAGOGASTRODUODENOSCOPY, biopsy, dilatation;  Surgeon: Emmanuel Rogers MD;  Location: [facility] OR;  Service: Gastroenterology;   Laterality: N/A;  Esophagitis; Dilation- no stricture; Biopsy- esophagus   • HYSTERECTOMY      PARTIAL    • KNEE SURGERY     • NECK SURGERY     • SPINE SURGERY     • TUMOR REMOVAL Left     Leg   • UPPER GASTROINTESTINAL ENDOSCOPY  2014    gastritis.  done by dr. hastings       Social History     Socioeconomic History   • Marital status:      Spouse name: Not on file   • Number of children: 3   • Years of education: High School   • Highest education level: Not on file   Tobacco Use   • Smoking status: Never Smoker   • Smokeless tobacco: Never Used   • Tobacco comment: CAFFEINE USE: NONE   Vaping Use   • Vaping Use: Never used   Substance and Sexual Activity   • Alcohol use: No   • Drug use: No   • Sexual activity: Defer     Comment: EXERCISE - RARELY       Family History   Problem Relation Age of Onset   • Lupus Mother    • Heart failure Mother 59   • Heart disease Other    • Hypertension Other    • Heart attack Father    • Breast cancer Neg Hx        Review of Systems   Constitutional: Positive for malaise/fatigue.   Cardiovascular: Positive for dyspnea on exertion and leg swelling.   Musculoskeletal: Positive for arthritis, back pain, joint pain and myalgias.   Genitourinary: Positive for dysuria and frequency.   Psychiatric/Behavioral: Positive for depression.   All other systems reviewed and are negative.      Allergies   Allergen Reactions   • Baclofen Anxiety     Panic attack, nightmares   • Codeine Itching and Rash   • Lisinopril Cough   • Morphine Hives   • Penicillins Rash     Tolerates cephalosporins          Current Outpatient Medications:   •  ACCU-CHEK FASTCLIX LANCETS misc, TEST 3-4 TIMES DAILY AS DIRECTED, Disp: 400 each, Rfl: 3  •  ACCU-CHEK SMARTVIEW test strip, TEST BLOOD SUGAR THREE TIMES DAILY OR AS DIRECTED, Disp: 300 each, Rfl: 3  •  acetaminophen (TYLENOL) 325 MG tablet, Take 2 tablets by mouth Every 6 (Six) Hours As Needed for Mild Pain . OTC product, Disp: 40 tablet, Rfl: 0  •  albuterol  (PROVENTIL) (2.5 MG/3ML) 0.083% nebulizer solution, Take 2.5 mg by nebulization Every 4 (Four) Hours As Needed for Wheezing., Disp: 100 each, Rfl: 2  •  atorvastatin (LIPITOR) 10 MG tablet, TAKE ONE TABLET BY MOUTH AT BEDTIME, Disp: 90 tablet, Rfl: 1  •  Blood Glucose Monitoring Suppl (ACCU-CHEK KENNETH SMARTVIEW) w/Device kit, TEST blood sugar three times daily or as directed, Disp: 1 kit, Rfl: 0  •  cyclobenzaprine (FLEXERIL) 10 MG tablet, TAKE ONE TABLET BY MOUTH THREE TIMES DAILY as needed for muscle spasms, Disp: 45 tablet, Rfl: 0  •  desvenlafaxine (PRISTIQ) 50 MG 24 hr tablet, TAKE ONE TABLET BY MOUTH EVERY DAY, Disp: 90 tablet, Rfl: 1  •  Diclofenac Sodium (Voltaren) 1 % gel gel, Apply 4 g topically to the appropriate area as directed 4 (Four) Times a Day As Needed (shoulder pain)., Disp: 400 g, Rfl: 2  •  dicyclomine (Bentyl) 10 MG capsule, Take 1 cap po q 8 hours prn spasm, Disp: 60 capsule, Rfl: 0  •  Eliquis 5 MG tablet tablet, TAKE ONE TABLET BY MOUTH TWICE DAILY, Disp: 180 tablet, Rfl: 0  •  famotidine (PEPCID) 40 MG tablet, Take 1 tablet by mouth 2 (Two) Times a Day. With lunch and at bedtime, Disp: 180 tablet, Rfl: 3  •  furosemide (LASIX) 20 MG tablet, Take one tab daily every day.  Take 1 additional pill daily PRN LE swelling, Disp: 180 tablet, Rfl: 1  •  gabapentin (NEURONTIN) 400 MG capsule, TAKE ONE CAPSULE BY MOUTH EVERY MORNING, AT NOON, AND TWO AT BEDTIME, Disp: 120 capsule, Rfl: 2  •  HYDROcodone-acetaminophen (NORCO) 5-325 MG per tablet, TAKE ONE TABLET BY MOUTH every 12 hours AS NEEDED FOR SEVERE PAIN, Disp: 30 tablet, Rfl: 0  •  insulin aspart (novoLOG) 100 UNIT/ML injection, Inject 5 Units under the skin into the appropriate area as directed 3 (Three) Times a Day With Meals., Disp: , Rfl: 12  •  levoFLOXacin (LEVAQUIN) 250 MG tablet, Take 250 mg by mouth Daily., Disp: , Rfl:   •  levothyroxine (SYNTHROID, LEVOTHROID) 88 MCG tablet, TAKE ONE TABLET BY MOUTH EVERY DAY, Disp: 90 tablet, Rfl:  "1  •  loratadine (Claritin) 10 MG tablet, Take 10 mg by mouth Daily., Disp: , Rfl:   •  midodrine (PROAMATINE) 2.5 MG tablet, TAKE ONE TABLET BY MOUTH THREE TIMES DAILY (Patient taking differently: 5 mg.), Disp: 30 tablet, Rfl: 0  •  Mirabegron ER (Myrbetriq) 25 MG tablet sustained-release 24 hour 24 hr tablet, Take 1 tablet by mouth Daily., Disp: 30 tablet, Rfl: 5  •  Nebulizer device, 1 each Take As Directed., Disp: 1 each, Rfl: 0  •  Needle, Disp, (BD DISP NEEDLES) 30G X 1/2\" misc, To be used 3 times daily with Novolog Flexpen., Disp: 100 each, Rfl: 5  •  nitrofurantoin, macrocrystal-monohydrate, (MACROBID) 100 MG capsule, Take 100 mg by mouth Daily., Disp: , Rfl:   •  nitrofurantoin, macrocrystal-monohydrate, (MACROBID) 100 MG capsule, Take 100 mg by mouth 2 (Two) Times a Day., Disp: , Rfl:   •  nystatin (MYCOSTATIN) 713287 UNIT/GM cream, Apply  topically to the appropriate area as directed 2 (two) times a day., Disp: 30 g, Rfl: 0  •  O2 (OXYGEN), Inhale 2 L/min Every Night. Uses 2L  overnight only, Disp: , Rfl:   •  omeprazole (priLOSEC) 40 MG capsule, Take 1 capsule by mouth 2 (two) times a day., Disp: 180 capsule, Rfl: 3  •  ondansetron (ZOFRAN) 8 MG tablet, Take 1 tablet by mouth 3 (Three) Times a Day As Needed for Nausea or Vomiting., Disp: 30 tablet, Rfl: 5  •  phenazopyridine (PYRIDIUM) 200 MG tablet, Take 200 mg by mouth 3 (Three) Times a Day., Disp: , Rfl:   •  potassium chloride 10 MEQ CR tablet, TAKE ONE TABLET BY MOUTH EVERY DAY, Disp: 90 tablet, Rfl: 2  •  sennosides-docusate (senna-docusate sodium) 8.6-50 MG per tablet, Take 2 tablets by mouth Daily., Disp: 60 tablet, Rfl: 3  •  Tresiba FlexTouch 200 UNIT/ML solution pen-injector pen injection, INJECT 54 UNITS subcutaneously AT BEDTIME, Disp: 9 mL, Rfl: 11  •  UltiCare Mini Pen Needles 31G X 6 MM misc, USE TO inject insulins FOUR TIMES DAILY AS DIRECTED, Disp: 400 each, Rfl: 3  •  Ventolin  (90 Base) MCG/ACT inhaler, INHALE TWO PUFFS BY " 18 "MOUTH EVERY 4 HOURS AS NEEDED FOR WHEEZING, Disp: 18 g, Rfl: 3  No current facility-administered medications for this visit.    Facility-Administered Medications Ordered in Other Visits:   •  acetaminophen (TYLENOL) tablet 650 mg, 650 mg, Oral, Q6H PRN, Elieser Headley MD  •  diphenhydrAMINE (BENADRYL) capsule 25 mg, 25 mg, Oral, Once, Elieser Headley MD  •  lactated ringers infusion, 100 mL/hr, Intravenous, Continuous, Serafin Ferreira CRNA  •  ondansetron (ZOFRAN) injection 4 mg, 4 mg, Intravenous, Once PRN, Serafin Ferreira CRNA      Objective:     Vitals:    06/15/21 1106   BP: 110/60   Pulse: 81   SpO2: 99%   Weight: 83 kg (183 lb)   Height: 157.5 cm (62\")     Body mass index is 33.47 kg/m².    Physical Exam  Constitutional:       Comments: Chronically ill appearing, in a wheelchair, alert, no distress   HENT:      Mouth/Throat:      Comments: Masked  Eyes:      Conjunctiva/sclera: Conjunctivae normal.   Cardiovascular:      Rate and Rhythm: Normal rate and regular rhythm.      Pulses: Normal pulses.      Heart sounds: Normal heart sounds.   Pulmonary:      Effort: Pulmonary effort is normal.      Breath sounds: Normal breath sounds.   Abdominal:      Palpations: Abdomen is soft.      Tenderness: There is no abdominal tenderness.   Musculoskeletal:         General: Swelling (trace) present.   Skin:     General: Skin is warm and dry.   Neurological:      General: No focal deficit present.   Psychiatric:         Mood and Affect: Mood normal.           ECG 12 Lead    Date/Time: 6/15/2021 12:23 PM  Performed by: Kehinde Sanon MD  Authorized by: Kehinde Sanon MD   Comparison: compared with previous ECG   Similar to previous ECG  Rhythm: sinus rhythm  Conduction: right bundle branch block and left anterior fascicular block  ST Segments: ST segments normal  T Waves: T waves normal  QRS axis: left  Other: no other findings    Clinical impression: abnormal EKG            Assessment:       Diagnosis Plan "   1. Chronic diastolic CHF (congestive heart failure) (CMS/[facility])     2. Essential hypertension     3. Orthostatic hypotension     4. CKD stage 3 due to type 2 diabetes mellitus (CMS/[facility])     5. Abnormal stress test     6. History of deep venous thrombosis (DVT) of distal vein of left lower extremity        Plan:       1/2/3/4.  She has a history of diastolic CHF, essential hypertension, and orthostasis due to diabetic dysautonomia.  She's doing fairly well on midodrine so we'll continue that.  She requires a tiny dose of furosemide for leg swelling but I wouldn't want to increase it.    5.  Her one-second long sharp chest pain is noncardiac.  She had a minimally abnormal SPECT in 2017.  In the absence of angina, I would not recommend further evaluation of this low risk finding.    6. She remains on apixaban.     Sincerely,       Kehinde Sanon MD

## 2022-06-01 NOTE — TELEPHONE ENCOUNTER
Order Placed   Detail Level: Detailed Biopsy Photograph Reviewed: Yes Number Of Curettages: 3 Size Of Lesion In Cm: 0.8 Size Of Lesion After Curettage: 1.1 Add Intralesional Injection: No Concentration (Mg/Ml Or Millions Of Plaque Forming Units/Cc): 0.01 Total Volume (Ccs): 1 Anesthesia Type: 2% lidocaine with epinephrine Anesthesia Volume In Cc: 2 Cautery Type: electrodesiccation What Was Performed First?: Curettage Final Size Statement: The size of the lesion after curettage was Additional Information: (Optional): The wound was cleaned, and a pressure dressing was applied.  The patient received detailed post-op instructions. Consent was obtained from the patient. The risks, benefits and alternatives to therapy were discussed in detail. Specifically, the risks of infection, scarring, bleeding, prolonged wound healing, nerve injury, incomplete removal, allergy to anesthesia and recurrence were addressed. Alternatives to ED&C, such as: surgical removal and XRT were also discussed.  Prior to the procedure, the treatment site was clearly identified and confirmed by the patient. All components of Universal Protocol/PAUSE Rule completed. Post-Care Instructions: I reviewed with the patient in detail post-care instructions. Patient is to keep the area dry for 48 hours, and not to engage in any swimming until the area is healed. Should the patient develop any fevers, chills, bleeding, severe pain patient will contact the office immediately. Bill As A Line Item Or As Units: Line Item

## 2022-12-21 NOTE — ED NOTES
Call placed to lab for BC draw     Chandrika Li  08/06/21 0723     Purse String (Simple) Text: Given the location of the defect and the characteristics of the surrounding skin a purse string closure was deemed most appropriate.  Undermining was performed circumfirentially around the surgical defect.  A purse string suture was then placed and tightened.

## 2023-09-06 NOTE — NURSING NOTE
HGB 7.9 today.  Pt reports feeling extremely weak and dizzy.  Order placed for 2 units of PRBC's to be transfused today in [facility].     Cartilage Graft Text: The defect edges were debeveled with a #15 scalpel blade. Given the location of the defect, shape of the defect, the fact the defect involved a full thickness cartilage defect a cartilage graft was deemed most appropriate.  An appropriate donor site was identified, cleansed, and anesthetized. The cartilage graft was then harvested and transferred to the recipient site, oriented appropriately and then sutured into place.  The secondary defect was then repaired using a primary closure.

## 2024-12-05 NOTE — PROGRESS NOTES
Joya Singh is a 80 y.o. female, who presents with a chief complaint of   Chief Complaint   Patient presents with   • Anemia   • Hand Pain     right hand            HPI   Pt here for f/u     Her foot pain is improved.       Pt's wheelchair is broken and needs a new one.  She can transfer on her own but is very limited in mobility bc of fatigue and chronic msk pain.  She is not a candidate for surgery at this time.      MDS-Follows with oncology, recently saw in 12/9/20, on procrit/neupogen. No transfusion required in 2-3 months, Hgb today 8.9.       DM2-  She says most glucoses 120-170.   She denies hypoglycemia since last OV. Taking 54 u Tresiba, prescribed 5u meal time insulin but not currently taking it.  Their entire home has been trying to cut out more sugar.      She has diabetic retinopathy in her left eye and got a shot in it recently.  She goes back to [facility] next week.  She has proteinuria/ckd stage 4/nephropathy as well.     She saw dr. Fall and was put on mybertriq for OAB.  She has also been drinking a glass of cranberry juice a day.  The new med has really helped her urgency.     HH is seeing pt with PT/OT in her home    Family worried that pt may be aspirating.  She has a chronic cough. She is on PPI, has inhaler, and allergy med prn.  Pt does admit to choking with foods at times.     Right hand pain - pt with hx high uric acid.  No fall or injury.  No bruisig    The following portions of the patient's history were reviewed and updated as appropriate: allergies, current medications, past family history, past medical history, past social history, past surgical history and problem list.    Allergies: Baclofen, Codeine, Lisinopril, Morphine, and Penicillins    Review of Systems   Constitutional: Negative.    HENT: Negative.    Eyes: Negative.    Respiratory: Negative.    Cardiovascular: Negative.    Gastrointestinal: Negative.    Endocrine: Negative.    Genitourinary: Negative.    Musculoskeletal:  "Negative.    Skin: Negative.    Allergic/Immunologic: Negative.    Neurological: Negative.    Hematological: Negative.    Psychiatric/Behavioral: Negative.    All other systems reviewed and are negative.          Vitals:    02/24/21 1303   BP: 130/78   BP Location: Right arm   Patient Position: Sitting   Cuff Size: Large Adult   Pulse: 79   Resp: 18   Temp: 97.1 °F (36.2 °C)   TempSrc: Temporal   SpO2: 93%   Weight: 91.6 kg (202 lb)   Height: 157.5 cm (62.01\")       Wt Readings from Last 3 Encounters:   02/24/21 91.6 kg (202 lb)   01/22/21 92.1 kg (203 lb)   01/13/21 89.4 kg (197 lb)     Temp Readings from Last 3 Encounters:   02/24/21 97.1 °F (36.2 °C) (Temporal)   02/17/21 97.7 °F (36.5 °C) (Infrared)   02/03/21 97.5 °F (36.4 °C) (Infrared)     BP Readings from Last 3 Encounters:   02/24/21 130/78   01/22/21 134/70   01/13/21 122/72     Pulse Readings from Last 3 Encounters:   02/24/21 79   01/22/21 63   01/13/21 76     Body mass index is 36.94 kg/m².  SpO2 Readings from Last 3 Encounters:   02/24/21 93%   01/22/21 94%   01/13/21 92%          Physical Exam  Vitals signs and nursing note reviewed.   Constitutional:       General: She is not in acute distress.     Appearance: She is well-developed.   HENT:      Head: Normocephalic and atraumatic.      Right Ear: External ear normal.      Left Ear: External ear normal.      Nose: Nose normal.   Eyes:      Conjunctiva/sclera: Conjunctivae normal.      Pupils: Pupils are equal, round, and reactive to light.   Neck:      Musculoskeletal: Normal range of motion and neck supple.   Cardiovascular:      Rate and Rhythm: Normal rate and regular rhythm.      Heart sounds: Normal heart sounds.   Pulmonary:      Effort: Pulmonary effort is normal. No respiratory distress.      Breath sounds: Normal breath sounds. No wheezing.   Musculoskeletal: Normal range of motion.      Comments: In wheelchair  ttp over first mcp joint with mild erythema   Skin:     General: Skin is warm and " dry.   Neurological:      Mental Status: She is alert and oriented to person, place, and time.   Psychiatric:         Behavior: Behavior normal.         Thought Content: Thought content normal.         Judgment: Judgment normal.         Results for orders placed or performed in visit on 02/24/21   CBC Auto Differential    Specimen: Blood   Result Value Ref Range    WBC 5.41 3.40 - 10.80 10*3/mm3    RBC 2.77 (L) 3.77 - 5.28 10*6/mm3    Hemoglobin 8.9 (L) 12.0 - 15.9 g/dL    Hematocrit 27.4 (L) 34.0 - 46.6 %    MCV 98.9 (H) 79.0 - 97.0 fL    MCH 32.1 26.6 - 33.0 pg    MCHC 32.5 31.5 - 35.7 g/dL    RDW 23.3 (H) 12.3 - 15.4 %    RDW-SD 79.9 (H) 37.0 - 54.0 fl    MPV 11.4 6.0 - 12.0 fL    Platelets 294 140 - 450 10*3/mm3    Neutrophil % 45.3 42.7 - 76.0 %    Lymphocyte % 38.4 19.6 - 45.3 %    Monocyte % 6.3 5.0 - 12.0 %    Eosinophil % 6.7 (H) 0.3 - 6.2 %    Basophil % 1.8 (H) 0.0 - 1.5 %    Immature Grans % 1.5 (H) 0.0 - 0.5 %    Neutrophils, Absolute 2.45 1.70 - 7.00 10*3/mm3    Lymphocytes, Absolute 2.08 0.70 - 3.10 10*3/mm3    Monocytes, Absolute 0.34 0.10 - 0.90 10*3/mm3    Eosinophils, Absolute 0.36 0.00 - 0.40 10*3/mm3    Basophils, Absolute 0.10 0.00 - 0.20 10*3/mm3    Immature Grans, Absolute 0.08 (H) 0.00 - 0.05 10*3/mm3    nRBC 0.0 0.0 - 0.2 /100 WBC     *Note: Due to a large number of results and/or encounters for the requested time period, some results have not been displayed. A complete set of results can be found in Results Review.     Result Review :                  Assessment and Plan    Diagnoses and all orders for this visit:    1. Bilateral leg edema (Primary)  -     Prescription Compression Stockings  -     furosemide (LASIX) 20 MG tablet; Take one tab daily every day.  Take 1 additional pill daily PRN LE swelling  Dispense: 180 tablet; Refill: 1    2. Chronic diastolic CHF (congestive heart failure) (CMS/[facility])    3. Uncontrolled type 2 diabetes mellitus with hyperglycemia (CMS/[facility])    4.  Myelodysplastic syndrome with 5q deletion (CMS/HCC)    5. Acquired hypothyroidism    6. Chronic cough  -     FL video swallow w speech; Future    7. Choking, initial encounter  -     FL video swallow w speech; Future    8. Acute gout of right hand, unspecified cause  -     colchicine 0.6 MG tablet; Take 2 tabs po x 1 then 1 tab 1 hour later  Dispense: 3 tablet; Refill: 0    Other orders  -     Mirabegron ER (Myrbetriq) 25 MG tablet sustained-release 24 hour 24 hr tablet; Take 1 tablet by mouth Daily.  Dispense: 30 tablet; Refill: 5             Outpatient Medications Prior to Visit   Medication Sig Dispense Refill   • ACCU-CHEK FASTCLIX LANCETS misc TEST 3-4 TIMES DAILY AS DIRECTED 400 each 3   • ACCU-CHEK SMARTVIEW test strip TEST BLOOD SUGAR THREE TIMES DAILY OR AS DIRECTED 300 each 3   • acetaminophen (TYLENOL) 325 MG tablet Take 2 tablets by mouth Every 6 (Six) Hours As Needed for Mild Pain . OTC product 40 tablet 0   • atorvastatin (LIPITOR) 10 MG tablet TAKE ONE TABLET BY MOUTH AT BEDTIME 90 tablet 1   • Blood Glucose Monitoring Suppl (ACCU-CHEK KENNETH SMARTVIEW) w/Device kit TEST blood sugar three times daily or as directed 1 kit 0   • cyclobenzaprine (FLEXERIL) 10 MG tablet TAKE ONE TABLET BY MOUTH THREE TIMES DAILY as needed for muscle spasms 45 tablet 0   • desvenlafaxine (PRISTIQ) 50 MG 24 hr tablet TAKE ONE TABLET BY MOUTH EVERY DAY 90 tablet 1   • Diclofenac Sodium (Voltaren) 1 % gel gel Apply 4 g topically to the appropriate area as directed 4 (Four) Times a Day As Needed (shoulder pain). 400 g 2   • dicyclomine (Bentyl) 10 MG capsule Take 1 cap po q 8 hours prn spasm 60 capsule 0   • diphenhydrAMINE (BENADRYL) 25 mg capsule Take 25 mg by mouth Every 6 (Six) Hours As Needed for Itching.     • Eliquis 5 MG tablet tablet TAKE ONE TABLET BY MOUTH TWICE DAILY 180 tablet 0   • gabapentin (NEURONTIN) 400 MG capsule TAKE ONE CAPSULE BY MOUTH EVERY MORNING, AT NOON, AND TWO AT BEDTIME 120 capsule 2   •  "HYDROcodone-acetaminophen (Norco) 5-325 MG per tablet Take 1 tablet by mouth Every 12 (Twelve) Hours As Needed for Severe Pain . 30 tablet 0   • insulin aspart (novoLOG) 100 UNIT/ML injection Inject 5 Units under the skin into the appropriate area as directed 3 (Three) Times a Day With Meals.  12   • Insulin Degludec (Tresiba FlexTouch) 200 UNIT/ML solution pen-injector pen injection Inject 50 Units under the skin into the appropriate area as directed Every Night. 9 mL 11   • midodrine (PROAMATINE) 2.5 MG tablet Take 2.5 mg by mouth 3 (Three) Times a Day.     • Needle, Disp, (BD DISP NEEDLES) 30G X 1/2\" misc To be used 3 times daily with Novolog Flexpen. 100 each 5   • nystatin (MYCOSTATIN) 802696 UNIT/GM powder Apply  topically to the appropriate area as directed 2 (Two) Times a Day. 60 g 2   • O2 (OXYGEN) Inhale 2 L/min Every Night. Uses 2L  overnight only     • omeprazole (priLOSEC) 40 MG capsule TAKE ONE CAPSULE BY MOUTH EVERY DAY 90 capsule 1   • ondansetron (ZOFRAN) 8 MG tablet Take 1 tablet by mouth 3 (Three) Times a Day As Needed for Nausea or Vomiting. 30 tablet 5   • phenazopyridine (PYRIDIUM) 200 MG tablet Take 200 mg by mouth 3 (Three) Times a Day.     • potassium chloride 10 MEQ CR tablet TAKE ONE TABLET BY MOUTH EVERY DAY 90 tablet 2   • sennosides-docusate (senna-docusate sodium) 8.6-50 MG per tablet Take 2 tablets by mouth Daily. 60 tablet 3   • UltiCare Mini Pen Needles 31G X 6 MM misc USE TO inject insulins FOUR TIMES DAILY AS DIRECTED 400 each 3   • Ventolin  (90 Base) MCG/ACT inhaler INHALE TWO PUFFS BY MOUTH EVERY 4 HOURS AS NEEDED FOR WHEEZING 18 g 3   • furosemide (LASIX) 20 MG tablet TAKE ONE (1) TABLET ORALLY (BY MOUTH) ONCE DAILY 90 tablet 1   • Mirabegron ER (Myrbetriq) 25 MG tablet sustained-release 24 hour 24 hr tablet Take 25 mg by mouth Daily.     • levothyroxine (SYNTHROID, LEVOTHROID) 88 MCG tablet TAKE ONE TABLET BY MOUTH EVERY DAY 90 tablet 1   • nitrofurantoin (MACRODANTIN) " Quality 47: Advance Care Plan: Advance Care Planning discussed and documented in the medical record; patient did not wish or was not able to name a surrogate decision maker or provide an advance care plan. 100 MG capsule Take 100 mg by mouth Daily.       Facility-Administered Medications Prior to Visit   Medication Dose Route Frequency Provider Last Rate Last Admin   • acetaminophen (TYLENOL) tablet 650 mg  650 mg Oral Q6H PRN Elieser Headley MD       • diphenhydrAMINE (BENADRYL) capsule 25 mg  25 mg Oral Once Elieser Headley MD         New Medications Ordered This Visit   Medications   • furosemide (LASIX) 20 MG tablet     Sig: Take one tab daily every day.  Take 1 additional pill daily PRN LE swelling     Dispense:  180 tablet     Refill:  1     This prescription was filled on 10/2/2020. Any refills authorized will be placed on file.   • Mirabegron ER (Myrbetriq) 25 MG tablet sustained-release 24 hour 24 hr tablet     Sig: Take 1 tablet by mouth Daily.     Dispense:  30 tablet     Refill:  5   • colchicine 0.6 MG tablet     Sig: Take 2 tabs po x 1 then 1 tab 1 hour later     Dispense:  3 tablet     Refill:  0     [unfilled]  Medications Discontinued During This Encounter   Medication Reason   • furosemide (LASIX) 20 MG tablet Reorder   • Mirabegron ER (Myrbetriq) 25 MG tablet sustained-release 24 hour 24 hr tablet Reorder         Return in about 1 month (around 3/24/2021) for Recheck, labs.    Patient was given instructions and counseling regarding her condition or for health maintenance advice. Please see specific information pulled into the AVS if appropriate.   Quality 226: Preventive Care And Screening: Tobacco Use: Screening And Cessation Intervention: Patient screened for tobacco use and is an ex/non-smoker Detail Level: Detailed

## (undated) DEVICE — GLV SURG SENSICARE MICRO PF LF 7.5 STRL

## (undated) DEVICE — GOWN ISOL W/THUMB UNIV BLU BX/15

## (undated) DEVICE — HYBRID TUBING/CAP SET FOR OLYMPUS® SCOPES: Brand: ERBE

## (undated) DEVICE — JACKT LAB F/R KNIT CUFF/COLR XLG BLU

## (undated) DEVICE — FRCP BX RADJAW4 NDL 2.8 240CM LG OG BX40

## (undated) DEVICE — THE BITE BLOCK MAXI, LATEX FREE STRAP IS USED TO PROTECT THE ENDOSCOPE INSERTION TUBE FROM BEING BITTEN BY THE PATIENT.

## (undated) DEVICE — Device: Brand: DEFENDO AIR/WATER/SUCTION AND BIOPSY VALVE

## (undated) DEVICE — KT ORCA ORCAPOD DISP STRL

## (undated) DEVICE — Device

## (undated) DEVICE — ENDO. PORT CONNECTOR W/VALVE FOR OLYMPUS® SCOPES: Brand: ERBE

## (undated) DEVICE — MASK,FACE,FLUID RESIST,SHLD,EARLOOP: Brand: MEDLINE

## (undated) DEVICE — ESOPHAGEAL/PYLORIC/COLONIC WIREGUIDED BALLOON DILATATION CATHETER: Brand: CRE WIREGUIDED

## (undated) DEVICE — BW-412T DISP COMBO CLEANING BRUSH: Brand: SINGLE USE COMBINATION CLEANING BRUSH

## (undated) DEVICE — VIAL FORMALIN CAP 10P 40ML

## (undated) DEVICE — SUCTION CANISTER, 1000CC,SAFELINER: Brand: DEROYAL

## (undated) DEVICE — SYR LL 3CC

## (undated) DEVICE — DEV INFL ALLIANCE2 SYS

## (undated) DEVICE — GLV SURG SENSICARE PI MIC PF SZ7.5 LF STRL

## (undated) DEVICE — SUCTION CANISTER, 3000CC,SAFELINER: Brand: DEROYAL

## (undated) DEVICE — SPNG GZ WOVN 4X4IN 12PLY 10/BX STRL